# Patient Record
Sex: MALE | Race: AMERICAN INDIAN OR ALASKA NATIVE | NOT HISPANIC OR LATINO | Employment: OTHER | URBAN - METROPOLITAN AREA
[De-identification: names, ages, dates, MRNs, and addresses within clinical notes are randomized per-mention and may not be internally consistent; named-entity substitution may affect disease eponyms.]

---

## 2017-01-04 ENCOUNTER — DOCUMENTATION ONLY (OUTPATIENT)
Dept: FAMILY MEDICINE | Facility: CLINIC | Age: 77
End: 2017-01-04

## 2017-01-04 NOTE — PROGRESS NOTES
Pre-Visit Chart Review  For Appointment Scheduled on 1/5/2017.    Health Maintenance Due   Topic Date Due    TETANUS VACCINE  09/27/1958

## 2017-01-05 ENCOUNTER — OFFICE VISIT (OUTPATIENT)
Dept: FAMILY MEDICINE | Facility: CLINIC | Age: 77
End: 2017-01-05
Payer: MEDICARE

## 2017-01-05 ENCOUNTER — HOSPITAL ENCOUNTER (OUTPATIENT)
Dept: RADIOLOGY | Facility: CLINIC | Age: 77
Discharge: HOME | End: 2017-01-05
Attending: FAMILY MEDICINE
Payer: MEDICARE

## 2017-01-05 VITALS
DIASTOLIC BLOOD PRESSURE: 73 MMHG | TEMPERATURE: 98 F | BODY MASS INDEX: 33.69 KG/M2 | HEIGHT: 73 IN | RESPIRATION RATE: 18 BRPM | WEIGHT: 254.19 LBS | SYSTOLIC BLOOD PRESSURE: 129 MMHG | HEART RATE: 54 BPM

## 2017-01-05 DIAGNOSIS — J30.2 SEASONAL ALLERGIC RHINITIS, UNSPECIFIED ALLERGIC RHINITIS TRIGGER: ICD-10-CM

## 2017-01-05 DIAGNOSIS — N40.0 BENIGN NON-NODULAR PROSTATIC HYPERPLASIA WITHOUT LOWER URINARY TRACT SYMPTOMS: ICD-10-CM

## 2017-01-05 DIAGNOSIS — M10.9 GOUT, UNSPECIFIED CAUSE, UNSPECIFIED CHRONICITY, UNSPECIFIED SITE: ICD-10-CM

## 2017-01-05 DIAGNOSIS — R55 SYNCOPE AND COLLAPSE: ICD-10-CM

## 2017-01-05 DIAGNOSIS — R42 DIZZINESS: Primary | ICD-10-CM

## 2017-01-05 DIAGNOSIS — R42 VERTIGO: ICD-10-CM

## 2017-01-05 PROCEDURE — 93880 EXTRACRANIAL BILAT STUDY: CPT | Mod: TC,PO

## 2017-01-05 PROCEDURE — 93880 EXTRACRANIAL BILAT STUDY: CPT | Mod: 26,,, | Performed by: RADIOLOGY

## 2017-01-05 PROCEDURE — 99214 OFFICE O/P EST MOD 30 MIN: CPT | Mod: S$PBB,,, | Performed by: FAMILY MEDICINE

## 2017-01-05 PROCEDURE — 99999 PR PBB SHADOW E&M-EST. PATIENT-LVL III: CPT | Mod: PBBFAC,,, | Performed by: FAMILY MEDICINE

## 2017-01-05 RX ORDER — NAPROXEN 500 MG/1
500 TABLET ORAL 2 TIMES DAILY WITH MEALS
Qty: 30 TABLET | Refills: 0 | Status: SHIPPED | OUTPATIENT
Start: 2017-01-05 | End: 2017-01-20 | Stop reason: ALTCHOICE

## 2017-01-05 RX ORDER — TERAZOSIN 10 MG/1
10 CAPSULE ORAL NIGHTLY
Qty: 180 CAPSULE | Refills: 3
Start: 2017-01-05 | End: 2017-04-26 | Stop reason: DRUGHIGH

## 2017-01-05 NOTE — MR AVS SNAPSHOT
[facility]  [address]  [place] [address]  Phone: [phone]  Fax: [phone]                  Micheal Hunt   2017 8:00 AM   Office Visit    Description:  Male : 1940   Provider:  Morris Ramírez MD   Department:  [place] - Family Medicine           Reason for Visit     Dizziness           Diagnoses this Visit        Comments    Dizziness    -  Primary     Vertigo         Syncope and collapse         Seasonal allergic rhinitis, unspecified allergic rhinitis trigger         Benign non-nodular prostatic hyperplasia without lower urinary tract symptoms         Gout, unspecified cause, unspecified chronicity, unspecified site                To Do List           Future Appointments        Provider Department Dept Phone    2017 1:30 PM Pk Lakhani MD [facility] Medicine [phone]    3/10/2017 8:00 AM [facility] [phone]    3/17/2017 10:30 AM Getachew Dunlap MD [place] [facility] - Cardiology [phone]      Goals (5 Years of Data)     None      Follow-Up and Disposition     Return if symptoms worsen or fail to improve.       These Medications        Disp Refills Start End    terazosin (HYTRIN) 10 MG capsule 180 capsule 3 2017     Take 1 capsule (10 mg total) by mouth every evening. - Oral    Pharmacy: [facility]/pharmacy #[id number] - [place], [place] - [address] AT [facility] Ph #: [phone]       naproxen (NAPROSYN) 500 MG tablet 30 tablet 0 2017     Take 1 tablet (500 mg total) by mouth 2 (two) times daily with meals. When having active gout flare - Oral    Pharmacy: [facility]/pharmacy #[id number] - [place] - [address] AT [facility] Ph #: [phone]         [facility] On [facility]     [facility] On Call Nurse Care Line -  Assistance  Registered nurses in the [facility] provide clinical advisement, health education, appointment booking, and other advisory services.  Call  for this free service at [phone].             Medications           Message regarding Medications     Verify the changes and/or additions to your medication regime listed below are the same as discussed with your clinician today.  If any of these changes or additions are incorrect, please notify your healthcare provider.        START taking these NEW medications        Refills    naproxen (NAPROSYN) 500 MG tablet 0    Sig: Take 1 tablet (500 mg total) by mouth 2 (two) times daily with meals. When having active gout flare    Class: Normal    Route: Oral      CHANGE how you are taking these medications     Start Taking Instead of    terazosin (HYTRIN) 10 MG capsule terazosin (HYTRIN) 10 MG capsule    Dosage:  Take 1 capsule (10 mg total) by mouth every evening. Dosage:  Take 2 capsules (20 mg total) by mouth every evening.    Reason for Change:  Reorder       STOP taking these medications     cetirizine-pseudoephedrine 5-120 mg Tb12 Take 1 tablet by mouth 2 (two) times daily.           Verify that the below list of medications is an accurate representation of the medications you are currently taking.  If none reported, the list may be blank. If incorrect, please contact your healthcare provider. Carry this list with you in case of emergency.           Current Medications     allopurinol (ZYLOPRIM) 300 MG tablet Take 1 tablet (300 mg total) by mouth once daily.    amlodipine (NORVASC) 10 MG tablet Take 1 tablet (10 mg total) by mouth once daily.    aspirin (ECOTRIN) 81 MG EC tablet Take 81 mg by mouth once daily.      atorvastatin (LIPITOR) 80 MG tablet Take 1 tablet (80 mg total) by mouth once daily.    azelastine (ASTELIN) 137 mcg (0.1 %) nasal spray USE 2 SPRAYS TWICE DAILY IN EACH NOSTRIL    calcium-vitamin D 500-125 mg-unit tablet Take 1 tablet by mouth as needed.     carvedilol (COREG) 6.25 MG tablet Take 1 tablet (6.25 mg total) by mouth 2 (two) times daily with meals.    colchicine 0.6 mg tablet as  "needed.     cyanocobalamin, vitamin B-12, (VITAMIN B-12) 1,000 mcg Subl Take 1 tablet by mouth daily as needed. Takes 3,000mcg    fexofenadine (ALLEGRA) 180 MG tablet Take 1 tablet (180 mg total) by mouth once daily.    finasteride (PROSCAR) 5 mg tablet Take 1 tablet (5 mg total) by mouth once daily.    fluticasone (FLONASE) 50 mcg/actuation nasal spray 1 spray by Each Nare route once daily.    isosorbide mononitrate (IMDUR) 30 MG 24 hr tablet Take 1 tablet (30 mg total) by mouth once daily.    meclizine (ANTIVERT) 25 mg tablet Take 1 tablet (25 mg total) by mouth 3 (three) times daily as needed.    nitroGLYCERIN (NITROSTAT) 0.4 MG SL tablet Place 1 tablet (0.4 mg total) under the tongue every 5 (five) minutes as needed for Chest pain.    spironolactone (ALDACTONE) 25 MG tablet Take 1 tablet (25 mg total) by mouth once daily.    terazosin (HYTRIN) 10 MG capsule Take 1 capsule (10 mg total) by mouth every evening.    tramadol (ULTRAM) 50 mg tablet Take 1 tablet (50 mg total) by mouth every 6 (six) hours as needed.    valsartan (DIOVAN) 320 MG tablet Take 1 tablet (320 mg total) by mouth once daily.    naproxen (NAPROSYN) 500 MG tablet Take 1 tablet (500 mg total) by mouth 2 (two) times daily with meals. When having active gout flare           Clinical Reference Information           Vital Signs - Last Recorded  Most recent update: 1/5/2017  8:25 AM by Sen Agosto MA    BP Pulse Temp Resp Ht Wt    129/73 (BP Location: Right arm, Patient Position: Sitting, BP Method: Automatic) (!) 54 97.6 °F (36.4 °C) (Oral) 18 6' 1" (1.854 m) 115.3 kg (254 lb 3.1 oz)    BMI                33.54 kg/m2          Blood Pressure          Most Recent Value    BP  129/73      Allergies as of 1/5/2017     Eplerenone      Immunizations Administered on Date of Encounter - 1/5/2017     None      Orders Placed During Today's Visit     Future Labs/Procedures Expected by Expires    US Carotid Bilateral  1/5/2017 1/5/2018      "

## 2017-01-05 NOTE — PROGRESS NOTES
"[facility]  Progress Note    Subjective:       Patient ID: Micheal Hunt is a 76 y.o. male.    Chief Complaint: Dizziness    HPI76 y.o.male is here today complaining of dizziness.  The patient has been having a sense of dizziness/sense that room is spinning when changing positions.  Patient has most of dizziness 2 hours after taking his morning medications.  Sensation will last a few seconds to a few minutes and resolve on its own.  Patient has not had episodes where he completely lost consciousness.  Patient has recently had increase of dosage on Terazosin and Sudafed.  Patient has been scheduled to receive carotid ultrasound in the past but has not been scheduled.  Patient denies any chest pain, heart palpitations, shortness of breath, nausea vomiting, or diaphoresis during episodes.  Patient has appointment with ENT and cardiology next week.  No further complaints at today's visit.  Review of Systems   Constitutional: Negative for chills and fever.   HENT: Negative for congestion.    Eyes: Negative for pain.   Respiratory: Negative for shortness of breath and wheezing.    Cardiovascular: Negative for chest pain, palpitations and leg swelling.   Gastrointestinal: Negative for abdominal pain, nausea and vomiting.   Genitourinary: Negative for difficulty urinating.   Musculoskeletal: Negative for arthralgias, gait problem and myalgias.   Skin: Negative for rash.   Neurological: Positive for dizziness. Negative for seizures.       Objective:      Vitals:    01/05/17 0825   BP: 129/73   BP Location: Right arm   Patient Position: Sitting   BP Method: Automatic   Pulse: (!) 54   Resp: 18   Temp: 97.6 °F (36.4 °C)   TempSrc: Oral   Weight: 115.3 kg (254 lb 3.1 oz)   Height: 6' 1" (1.854 m)     Body mass index is 33.54 kg/(m^2).  Physical Exam   Constitutional: He is oriented to person, place, and time. He appears well-developed and well-nourished.   HENT:   Head: Normocephalic and atraumatic.   Negative " dill-hallpike bilateral    Eyes: Conjunctivae and EOM are normal. Pupils are equal, round, and reactive to light.   Neck: Normal range of motion. Neck supple. No JVD present.   Cardiovascular: Normal rate, regular rhythm and intact distal pulses.  Exam reveals no gallop and no friction rub.    Murmur heard.  3/5 systolic heart murmur  Negative carotid bruit   Patient dizzy when changing from seated to standing position    Pulmonary/Chest: Effort normal and breath sounds normal. No respiratory distress. He has no wheezes.   Abdominal: Soft. Bowel sounds are normal. There is no tenderness.   Musculoskeletal: Normal range of motion.   Neurological: He is alert and oriented to person, place, and time. No cranial nerve deficit.   + romberg   Skin: Skin is warm and dry.   Psychiatric: He has a normal mood and affect. His behavior is normal. Judgment and thought content normal.   Nursing note and vitals reviewed.      Assessment:       1. Dizziness    2. Vertigo    3. Syncope and collapse     4. Seasonal allergic rhinitis, unspecified allergic rhinitis trigger    5. Benign non-nodular prostatic hyperplasia without lower urinary tract symptoms    6. Gout, unspecified cause, unspecified chronicity, unspecified site        Plan:       Vertigo/Dizziness  -     US Carotid Bilateral; Future; Expected date: 1/5/17   -     Differential diagnosis include vasovagal secondary to medications vs carotid artery occulusion  - Will decrease terazosin to 10 mg daily and discontinue sudafed          - Follow up with cardiology for further cardiac workup if needed     Seasonal allergic rhinitis, unspecified allergic rhinitis trigger        - Patient advised to discontinue Sudafed or any alpha blockers that may be contribution to dizziness    Benign non-nodular prostatic hyperplasia without lower urinary tract symptoms  -     terazosin (HYTRIN) 10 MG capsule; Take 1 capsule (10 mg total) by mouth every evening.  Dispense: 180 capsule; Refill:  3    Gout, unspecified cause, unspecified chronicity, unspecified site  -     naproxen (NAPROSYN) 500 MG tablet; Take 1 tablet (500 mg total) by mouth 2 (two) times daily with meals. When having active gout flare  Dispense: 30 tablet; Refill: 0  - Patient insurance is no longer paying for colchicine     Return if symptoms worsen or fail to improve.  Morris Ramírez MD  [facility]  1/5/2017 8:45 AM

## 2017-01-09 ENCOUNTER — TELEPHONE (OUTPATIENT)
Dept: FAMILY MEDICINE | Facility: CLINIC | Age: 77
End: 2017-01-09

## 2017-01-09 NOTE — TELEPHONE ENCOUNTER
----- Message from Morris Ramírez MD sent at 1/5/2017  2:12 PM CST -----  Please call and inform patient that his ultrasound of his neck showed moderate plaque formation bilaterally.  Patient does not make surgical intervention at the current time.  Patient will need to follow with cardiology.

## 2017-01-09 NOTE — TELEPHONE ENCOUNTER
Reviewed with patient; he voices understanding and has 3/2017  appointment. When asked, he says he has no questions/concerns.

## 2017-01-10 ENCOUNTER — HOSPITAL ENCOUNTER (OUTPATIENT)
Dept: RADIOLOGY | Facility: HOSPITAL | Age: 77
Discharge: HOME | End: 2017-01-10
Attending: OTOLARYNGOLOGY
Payer: MEDICARE

## 2017-01-10 DIAGNOSIS — J32.4 CHRONIC PANSINUSITIS: ICD-10-CM

## 2017-01-10 PROCEDURE — 70486 CT MAXILLOFACIAL W/O DYE: CPT | Mod: 26,,, | Performed by: RADIOLOGY

## 2017-01-10 PROCEDURE — 70486 CT MAXILLOFACIAL W/O DYE: CPT | Mod: TC

## 2017-01-20 ENCOUNTER — LAB VISIT (OUTPATIENT)
Dept: LAB | Facility: HOSPITAL | Age: 77
End: 2017-01-20
Attending: FAMILY MEDICINE
Payer: MEDICARE

## 2017-01-20 ENCOUNTER — OFFICE VISIT (OUTPATIENT)
Dept: FAMILY MEDICINE | Facility: CLINIC | Age: 77
End: 2017-01-20
Payer: MEDICARE

## 2017-01-20 VITALS
SYSTOLIC BLOOD PRESSURE: 122 MMHG | HEIGHT: 73 IN | BODY MASS INDEX: 33.4 KG/M2 | HEART RATE: 72 BPM | DIASTOLIC BLOOD PRESSURE: 73 MMHG | WEIGHT: 252 LBS | TEMPERATURE: 98 F

## 2017-01-20 DIAGNOSIS — I10 ESSENTIAL HYPERTENSION: Primary | ICD-10-CM

## 2017-01-20 DIAGNOSIS — I95.2 HYPOTENSION DUE TO DRUGS: Chronic | ICD-10-CM

## 2017-01-20 DIAGNOSIS — M17.2 POST-TRAUMATIC OSTEOARTHRITIS OF BOTH KNEES: ICD-10-CM

## 2017-01-20 DIAGNOSIS — Z00.00 ANNUAL PHYSICAL EXAM: ICD-10-CM

## 2017-01-20 DIAGNOSIS — M1A.3720 CHRONIC GOUT OF LEFT ANKLE DUE TO RENAL IMPAIRMENT WITHOUT TOPHUS: ICD-10-CM

## 2017-01-20 LAB
ANION GAP SERPL CALC-SCNC: 6 MMOL/L
BUN SERPL-MCNC: 32 MG/DL
CALCIUM SERPL-MCNC: 8.7 MG/DL
CHLORIDE SERPL-SCNC: 108 MMOL/L
CO2 SERPL-SCNC: 25 MMOL/L
CREAT SERPL-MCNC: 2.6 MG/DL
EST. GFR  (AFRICAN AMERICAN): 26.3 ML/MIN/1.73 M^2
EST. GFR  (NON AFRICAN AMERICAN): 22.8 ML/MIN/1.73 M^2
GLUCOSE SERPL-MCNC: 131 MG/DL
POTASSIUM SERPL-SCNC: 4.4 MMOL/L
SODIUM SERPL-SCNC: 139 MMOL/L
URATE SERPL-MCNC: 6 MG/DL

## 2017-01-20 PROCEDURE — 84550 ASSAY OF BLOOD/URIC ACID: CPT

## 2017-01-20 PROCEDURE — 86787 VARICELLA-ZOSTER ANTIBODY: CPT

## 2017-01-20 PROCEDURE — 80048 BASIC METABOLIC PNL TOTAL CA: CPT

## 2017-01-20 PROCEDURE — 99214 OFFICE O/P EST MOD 30 MIN: CPT | Mod: S$PBB,,, | Performed by: FAMILY MEDICINE

## 2017-01-20 PROCEDURE — 99213 OFFICE O/P EST LOW 20 MIN: CPT | Mod: PBBFAC,PO | Performed by: FAMILY MEDICINE

## 2017-01-20 PROCEDURE — 36415 COLL VENOUS BLD VENIPUNCTURE: CPT | Mod: PO

## 2017-01-20 PROCEDURE — 99999 PR PBB SHADOW E&M-EST. PATIENT-LVL III: CPT | Mod: PBBFAC,,, | Performed by: FAMILY MEDICINE

## 2017-01-20 RX ORDER — COLCHICINE 0.6 MG/1
0.6 TABLET ORAL 2 TIMES DAILY
Qty: 10 TABLET | Refills: 3 | Status: SHIPPED | OUTPATIENT
Start: 2017-01-20 | End: 2017-04-24 | Stop reason: SDUPTHER

## 2017-01-20 RX ORDER — AMLODIPINE BESYLATE 10 MG/1
10 TABLET ORAL
Qty: 90 TABLET | Refills: 3
Start: 2017-01-20 | End: 2017-03-24 | Stop reason: SDUPTHER

## 2017-01-20 NOTE — PROGRESS NOTES
Subjective:       Patient ID: Micheal Hunt is a 76 y.o. male.    Chief Complaint: Establish Care; Dizziness; and Hypertension    HPI Comments: Dizziness maybe associated to BP medications, orthostatic. No vertigo no aura,     Dizziness:   Chronicity:  Chronic  Progression since onset:  Waxing and waning  Pain Scale:  4/10  Dizziness characteristics:  Spacial disorientation and giddiness   Associated symptoms: hearing loss, ear congestion, light-headedness and palpitations.no fever, no headaches, no diaphoresis, no facial weakness, no slurred speech, no numbness in extremities and no chest pain.  Aggravated by:  Getting up   PMH includes: neurologic disease.    Review of Systems   Constitutional: Positive for fatigue. Negative for diaphoresis and fever.   HENT: Positive for hearing loss and postnasal drip.    Eyes: Negative for discharge.   Respiratory: Positive for shortness of breath. Negative for wheezing and stridor.    Cardiovascular: Positive for palpitations. Negative for chest pain and leg swelling.   Gastrointestinal: Negative for abdominal distention and anal bleeding.   Genitourinary: Positive for urgency. Negative for difficulty urinating, dysuria and enuresis.   Musculoskeletal: Positive for arthralgias and back pain.   Neurological: Positive for dizziness and light-headedness. Negative for headaches.   Psychiatric/Behavioral: Positive for decreased concentration.       Objective:      Physical Exam   Constitutional: He is oriented to person, place, and time. He appears well-developed and well-nourished. No distress.   HENT:   Head: Normocephalic and atraumatic.   Right Ear: External ear normal.   Left Ear: External ear normal.   Nose: Nose normal.   Mouth/Throat: Oropharynx is clear and moist. No oropharyngeal exudate.   Eyes: Conjunctivae and EOM are normal. Pupils are equal, round, and reactive to light. Right eye exhibits no discharge. Left eye exhibits no discharge. No scleral icterus.    Neck: Normal range of motion. Neck supple. No JVD present. No tracheal deviation present. No thyromegaly present.   Cardiovascular: Normal rate, normal heart sounds and intact distal pulses.    No murmur heard.  Pulmonary/Chest: Effort normal and breath sounds normal. No respiratory distress. He has no wheezes. He has no rales.   Abdominal: Soft. Bowel sounds are normal. He exhibits no distension and no mass. There is no tenderness. There is no rebound and no guarding.   Musculoskeletal: He exhibits no edema or tenderness.          Lymphadenopathy:     He has no cervical adenopathy.   Neurological: He is alert and oriented to person, place, and time. No cranial nerve deficit. Coordination normal.   Skin: Skin is warm and dry. No rash noted. He is not diaphoretic. No erythema. No pallor.   Psychiatric: He has a normal mood and affect. His behavior is normal. Judgment and thought content normal.   Vitals reviewed.      Assessment:       1. Essential hypertension    2. Hypotension due to drugs    3. 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 Annual physical exam    5. Post-traumatic osteoarthritis of both knees        Plan:       Essential hypertension  -     amlodipine (NORVASC) 10 MG tablet; Take 1 tablet (10 mg total) by mouth before dinner.  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  Dispense: 10 tablet; Refill: 3    Annual physical exam  -     Varicella zoster antibody, IgG; Future; Expected date: 1/20/17    Post-traumatic osteoarthritis of both knees  -     Ambulatory Referral to Physical/Occupational Therapy      Patient readiness: acceptance and  barriers:readiness, social stressors and household issues    During the course of the visit the patient was educated and counseled about the following:     Hypertension:   Dietary sodium restriction.  Regular aerobic exercise.  Check blood pressures daily and record.  Obesity:   General weight loss/lifestyle modification strategies discussed (elicit support from others; identify saboteurs; non-food rewards, etc).    Goals: Hypertension: Reduce Blood Pressure and Obesity: Reduce calorie intake and BMI    Did patient meet goals/outcomes: Yes    The following self management tools provided: blood pressure log  excercise log    Patient Instructions (the written plan) was given to the patient/family.     Time spent with patient: 45 minutes

## 2017-01-20 NOTE — MR AVS SNAPSHOT
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         Annual physical exam         Post-traumatic osteoarthritis of both knees                To Do List           Future Appointments        Provider Department Dept Phone    2017 3:15 PM LAB, [facility] [place] [facility] - Lab [phone]    2017 8:00 AM Pk Lakhani MD [facility] [phone]    3/10/2017 8:00 AM [facility] [phone]    3/17/2017 10:30 AM Getachew Dunlap MD [facility] - Cardiology [phone]    2017 8:40 AM Viktoria Chauhan PA-C [facility] Medicine [phone]      Goals (5 Years of Data)     None      Follow-Up and Disposition     Return in about 3 months (around 2017).       These Medications        Disp Refills Start End    amlodipine (NORVASC) 10 MG tablet 90 tablet 3 2017     Take 1 tablet (10 mg total) by mouth before dinner. - Oral    Pharmacy: [facility]/pharmacy #[id number] - [place] - [address] AT [facility] Ph #: [phone]       colchicine 0.6 mg tablet 10 tablet 3 2017     Take 1 tablet (0.6 mg total) by mouth 2 (two) times daily. - Oral    Pharmacy: [facility]/pharmacy #[id number] - [place] - [address] AT [facility] Ph #: [phone]         [facility] On [facility]     [facility] Nurse Care Line -  Assistance  Registered nurses in the [facility] provide clinical advisement, health  education, appointment booking, and other advisory services.  Call for this free service at [phone].             Medications           Message regarding Medications     Verify the changes and/or additions to your medication regime listed below are the same as discussed with your clinician today.  If any of these changes or additions are incorrect, please notify your healthcare provider.        CHANGE how you are taking these medications     Start Taking Instead of    amlodipine (NORVASC) 10 MG tablet amlodipine (NORVASC) 10 MG tablet    Dosage:  Take 1 tablet (10 mg total) by mouth before dinner. Dosage:  Take 1 tablet (10 mg total) by mouth once daily.    Reason for Change:  Reorder     colchicine 0.6 mg tablet colchicine 0.6 mg tablet    Dosage:  Take 1 tablet (0.6 mg total) by mouth 2 (two) times daily. Dosage:  as needed.     Reason for Change:  Reorder       STOP taking these medications     spironolactone (ALDACTONE) 25 MG tablet Take 1 tablet (25 mg total) by mouth once daily.    naproxen (NAPROSYN) 500 MG tablet Take 1 tablet (500 mg total) by mouth 2 (two) times daily with meals. When having active gout flare    meclizine (ANTIVERT) 25 mg tablet Take 1 tablet (25 mg total) by mouth 3 (three) times daily as needed.    isosorbide mononitrate (IMDUR) 30 MG 24 hr tablet Take 1 tablet (30 mg total) by mouth once daily.           Verify that the below list of medications is an accurate representation of the medications you are currently taking.  If none reported, the list may be blank. If incorrect, please contact your healthcare provider. Carry this list with you in case of emergency.           Current Medications     allopurinol (ZYLOPRIM) 300 MG tablet Take 1 tablet (300 mg total) by mouth once daily.    amlodipine (NORVASC) 10 MG tablet Take 1 tablet (10 mg total) by mouth before dinner.    aspirin (ECOTRIN) 81 MG EC tablet Take 81 mg by mouth once daily.      atorvastatin (LIPITOR) 80 MG tablet Take 1  "tablet (80 mg total) by mouth once daily.    azelastine (ASTELIN) 137 mcg (0.1 %) nasal spray USE 2 SPRAYS TWICE DAILY IN EACH NOSTRIL    calcium-vitamin D 500-125 mg-unit tablet Take 1 tablet by mouth as needed.     carvedilol (COREG) 6.25 MG tablet Take 1 tablet (6.25 mg total) by mouth 2 (two) times daily with meals.    colchicine 0.6 mg tablet Take 1 tablet (0.6 mg total) by mouth 2 (two) times daily.    cyanocobalamin, vitamin B-12, (VITAMIN B-12) 1,000 mcg Subl Take 1 tablet by mouth daily as needed. Takes 3,000mcg    fexofenadine (ALLEGRA) 180 MG tablet Take 1 tablet (180 mg total) by mouth once daily.    finasteride (PROSCAR) 5 mg tablet Take 1 tablet (5 mg total) by mouth once daily.    fluticasone (FLONASE) 50 mcg/actuation nasal spray 1 spray by Each Nare route once daily.    nitroGLYCERIN (NITROSTAT) 0.4 MG SL tablet Place 1 tablet (0.4 mg total) under the tongue every 5 (five) minutes as needed for Chest pain.    terazosin (HYTRIN) 10 MG capsule Take 1 capsule (10 mg total) by mouth every evening.    tramadol (ULTRAM) 50 mg tablet Take 1 tablet (50 mg total) by mouth every 6 (six) hours as needed.    valsartan (DIOVAN) 320 MG tablet Take 1 tablet (320 mg total) by mouth once daily.           Clinical Reference Information           Vital Signs - Last Recorded  Most recent update: 1/20/2017  1:46 PM by Lorrie Ramirez MA    BP Pulse Temp Ht Wt BMI    122/73 (BP Location: Right arm, Patient Position: Lying, BP Method: Automatic) 72 98.1 °F (36.7 °C) (Oral) 6' 1" (1.854 m) 114.3 kg (251 lb 15.8 oz) 33.25 kg/m2      Blood Pressure          Most Recent Value    BP  122/73      Allergies as of 1/20/2017     Eplerenone      Immunizations Administered on Date of Encounter - 1/20/2017     None      Orders Placed During Today's Visit      Normal Orders This Visit    Ambulatory Referral to Physical/Occupational Therapy     Future Labs/Procedures Expected by Expires    Basic metabolic panel  1/20/2017 3/21/2018    " Uric acid  1/20/2017 3/21/2018    Varicella zoster antibody, IgG  1/20/2017 3/21/2018

## 2017-01-23 LAB
VARICELLA INTERPRETATION: POSITIVE
VARICELLA ZOSTER IGG: 4.55 ISR

## 2017-01-24 ENCOUNTER — LAB VISIT (OUTPATIENT)
Dept: LAB | Facility: HOSPITAL | Age: 77
End: 2017-01-24
Attending: INTERNAL MEDICINE
Payer: MEDICARE

## 2017-01-24 DIAGNOSIS — M19.90 LOCALIZED OSTEOARTHROSIS NOT SPECIFIED WHETHER PRIMARY OR SECONDARY, UNSPECIFIED SITE: ICD-10-CM

## 2017-01-24 DIAGNOSIS — N40.0 ENLARGED PROSTATE: ICD-10-CM

## 2017-01-24 DIAGNOSIS — N18.30 CHRONIC KIDNEY DISEASE, STAGE III (MODERATE): Primary | ICD-10-CM

## 2017-01-24 DIAGNOSIS — M10.9 GOUT, UNSPECIFIED: ICD-10-CM

## 2017-01-24 DIAGNOSIS — R53.81 OTHER MALAISE AND FATIGUE: ICD-10-CM

## 2017-01-24 DIAGNOSIS — R53.83 OTHER MALAISE AND FATIGUE: ICD-10-CM

## 2017-01-24 DIAGNOSIS — I10 ESSENTIAL HYPERTENSION, MALIGNANT: ICD-10-CM

## 2017-01-24 DIAGNOSIS — N18.4 CHRONIC KIDNEY DISEASE, STAGE IV (SEVERE): ICD-10-CM

## 2017-01-24 DIAGNOSIS — I25.729 ATHEROSCLEROSIS OF AUTOLOGOUS ARTERY CORONARY ARTERY BYPASS GRAFT WITH ANGINA PECTORIS: ICD-10-CM

## 2017-01-24 LAB
ALBUMIN SERPL BCP-MCNC: 4 G/DL
ALP SERPL-CCNC: 100 U/L
ALT SERPL W/O P-5'-P-CCNC: 24 U/L
ANION GAP SERPL CALC-SCNC: 11 MMOL/L
AST SERPL-CCNC: 25 U/L
BASOPHILS # BLD AUTO: 0 K/UL
BASOPHILS NFR BLD: 0.4 %
BILIRUB SERPL-MCNC: 0.3 MG/DL
BUN SERPL-MCNC: 30 MG/DL
C3 SERPL-MCNC: 124 MG/DL
C4 SERPL-MCNC: 40 MG/DL
CALCIUM SERPL-MCNC: 9.4 MG/DL
CHLORIDE SERPL-SCNC: 105 MMOL/L
CO2 SERPL-SCNC: 22 MMOL/L
CREAT SERPL-MCNC: 2.5 MG/DL
DIFFERENTIAL METHOD: ABNORMAL
EOSINOPHIL # BLD AUTO: 0.2 K/UL
EOSINOPHIL NFR BLD: 3.1 %
ERYTHROCYTE [DISTWIDTH] IN BLOOD BY AUTOMATED COUNT: 13.8 %
EST. GFR  (AFRICAN AMERICAN): 28 ML/MIN/1.73 M^2
EST. GFR  (NON AFRICAN AMERICAN): 24 ML/MIN/1.73 M^2
GLUCOSE SERPL-MCNC: 113 MG/DL
HCT VFR BLD AUTO: 39 %
HGB BLD-MCNC: 12.8 G/DL
LYMPHOCYTES # BLD AUTO: 1.6 K/UL
LYMPHOCYTES NFR BLD: 30 %
MCH RBC QN AUTO: 29.8 PG
MCHC RBC AUTO-ENTMCNC: 32.8 %
MCV RBC AUTO: 91 FL
MONOCYTES # BLD AUTO: 0.3 K/UL
MONOCYTES NFR BLD: 6.4 %
NEUTROPHILS # BLD AUTO: 3.1 K/UL
NEUTROPHILS NFR BLD: 60.1 %
PHOSPHATE SERPL-MCNC: 4 MG/DL
PLATELET # BLD AUTO: 184 K/UL
PMV BLD AUTO: 7.8 FL
POTASSIUM SERPL-SCNC: 4.2 MMOL/L
PROT 24H UR-MRATE: 111 MG/SPEC
PROT SERPL-MCNC: 7.2 G/DL
PROT UR-MCNC: 370 MG/DL
PTH-INTACT SERPL-MCNC: 110.4 PG/ML
RBC # BLD AUTO: 4.31 M/UL
RHEUMATOID FACT SERPL-ACNC: <10 IU/ML
SODIUM SERPL-SCNC: 138 MMOL/L
URATE SERPL-MCNC: 6.7 MG/DL
URINE COLLECTION DURATION: ABNORMAL HR
URINE VOLUME: 30 ML
WBC # BLD AUTO: 5.2 K/UL

## 2017-01-24 PROCEDURE — 84550 ASSAY OF BLOOD/URIC ACID: CPT

## 2017-01-24 PROCEDURE — 86160 COMPLEMENT ANTIGEN: CPT | Mod: 59

## 2017-01-24 PROCEDURE — 84166 PROTEIN E-PHORESIS/URINE/CSF: CPT

## 2017-01-24 PROCEDURE — 84165 PROTEIN E-PHORESIS SERUM: CPT

## 2017-01-24 PROCEDURE — 84166 PROTEIN E-PHORESIS/URINE/CSF: CPT | Mod: 26,,, | Performed by: PATHOLOGY

## 2017-01-24 PROCEDURE — 86038 ANTINUCLEAR ANTIBODIES: CPT

## 2017-01-24 PROCEDURE — 85025 COMPLETE CBC W/AUTO DIFF WBC: CPT

## 2017-01-24 PROCEDURE — 83970 ASSAY OF PARATHORMONE: CPT

## 2017-01-24 PROCEDURE — 86431 RHEUMATOID FACTOR QUANT: CPT

## 2017-01-24 PROCEDURE — 86160 COMPLEMENT ANTIGEN: CPT

## 2017-01-24 PROCEDURE — 84165 PROTEIN E-PHORESIS SERUM: CPT | Mod: 26,,, | Performed by: PATHOLOGY

## 2017-01-24 PROCEDURE — 84100 ASSAY OF PHOSPHORUS: CPT

## 2017-01-24 PROCEDURE — 36415 COLL VENOUS BLD VENIPUNCTURE: CPT

## 2017-01-24 PROCEDURE — 84156 ASSAY OF PROTEIN URINE: CPT

## 2017-01-24 PROCEDURE — 80053 COMPREHEN METABOLIC PANEL: CPT

## 2017-01-25 LAB
ALBUMIN SERPL ELPH-MCNC: 4 G/DL
ALPHA1 GLOB SERPL ELPH-MCNC: 0.3 G/DL
ALPHA2 GLOB SERPL ELPH-MCNC: 0.57 G/DL
ANA SER QL IF: NORMAL
B-GLOBULIN SERPL ELPH-MCNC: 0.92 G/DL
GAMMA GLOB SERPL ELPH-MCNC: 1.21 G/DL
PROT SERPL-MCNC: 7 G/DL

## 2017-01-26 ENCOUNTER — TELEPHONE (OUTPATIENT)
Dept: FAMILY MEDICINE | Facility: CLINIC | Age: 77
End: 2017-01-26

## 2017-01-26 ENCOUNTER — CLINICAL SUPPORT (OUTPATIENT)
Dept: FAMILY MEDICINE | Facility: CLINIC | Age: 77
End: 2017-01-26
Payer: MEDICARE

## 2017-01-26 VITALS — SYSTOLIC BLOOD PRESSURE: 144 MMHG | HEART RATE: 72 BPM | TEMPERATURE: 98 F | DIASTOLIC BLOOD PRESSURE: 82 MMHG

## 2017-01-26 LAB
PATHOLOGIST INTERPRETATION UPE: NORMAL
PROT PATTERN UR ELPH-IMP: NORMAL

## 2017-01-26 PROCEDURE — 99213 OFFICE O/P EST LOW 20 MIN: CPT | Mod: PBBFAC,PO | Performed by: FAMILY MEDICINE

## 2017-01-26 PROCEDURE — 99999 PR PBB SHADOW E&M-EST. PATIENT-LVL III: CPT | Mod: PBBFAC,,, | Performed by: FAMILY MEDICINE

## 2017-01-26 NOTE — TELEPHONE ENCOUNTER
Spoke with patient, informed him of Dr. Lakhani's recommendations and need for follow up nurse visit related to hypertension. Appointment scheduled. Patient verbalized understanding.

## 2017-01-26 NOTE — PROGRESS NOTES
Patient present for nurse visit related to hypertension. Blood pressure assessed in left arm with patient's new monitoring device while patient was sitting up straight, feet flat on the floor, & arm resting on desk with a result of 164/90 Pulse 71.  Blood pressure was then assessed in left arm with patient's old monitoring device with result of 155/95 Pulse 73.  After 5 minutes blood pressure was assessed in right arm using office robot with result of 142/86 Pulse 72.  Blood pressure assessed manually in left arm with result of 144/82. Patient denies HA, dizziness, or pain of any kind. Encouraged patient to continue all medications as prescribed. Patient verbalized understanding. Blood pressure log provided by patient and will be sent to scanning.

## 2017-01-28 DIAGNOSIS — E78.5 HYPERLIPIDEMIA: ICD-10-CM

## 2017-01-30 DIAGNOSIS — E78.5 HYPERLIPIDEMIA, UNSPECIFIED HYPERLIPIDEMIA TYPE: ICD-10-CM

## 2017-01-30 LAB — PATHOLOGIST INTERPRETATION SPE: NORMAL

## 2017-01-30 RX ORDER — ATORVASTATIN CALCIUM 80 MG/1
40 TABLET, FILM COATED ORAL DAILY
Qty: 45 TABLET | Refills: 3 | Status: SHIPPED | OUTPATIENT
Start: 2017-01-30 | End: 2017-03-24 | Stop reason: SDUPTHER

## 2017-01-30 RX ORDER — ATORVASTATIN CALCIUM 80 MG/1
TABLET, FILM COATED ORAL
Qty: 90 TABLET | Refills: 2 | Status: SHIPPED | OUTPATIENT
Start: 2017-01-30 | End: 2017-01-30 | Stop reason: SDUPTHER

## 2017-02-01 ENCOUNTER — TELEPHONE (OUTPATIENT)
Dept: PODIATRY | Facility: CLINIC | Age: 77
End: 2017-02-01

## 2017-02-01 NOTE — TELEPHONE ENCOUNTER
----- Message from Mary Castro sent at 2/1/2017 11:20 AM CST -----  Please call patient in regards to a appt today for rt foot pain [phone] (home)

## 2017-02-02 ENCOUNTER — HOSPITAL ENCOUNTER (OUTPATIENT)
Dept: RADIOLOGY | Facility: CLINIC | Age: 77
Discharge: HOME | End: 2017-02-02
Attending: PODIATRIST
Payer: MEDICARE

## 2017-02-02 ENCOUNTER — OFFICE VISIT (OUTPATIENT)
Dept: PODIATRY | Facility: CLINIC | Age: 77
End: 2017-02-02
Payer: MEDICARE

## 2017-02-02 VITALS — BODY MASS INDEX: 32.84 KG/M2 | HEIGHT: 73 IN | WEIGHT: 247.81 LBS

## 2017-02-02 DIAGNOSIS — M19.079 ARTHRITIS OF FOOT: ICD-10-CM

## 2017-02-02 DIAGNOSIS — M19.079 ARTHRITIS OF FOOT: Primary | ICD-10-CM

## 2017-02-02 DIAGNOSIS — M21.6X9 ACQUIRED EQUINUS DEFORMITY OF FOOT, UNSPECIFIED LATERALITY: ICD-10-CM

## 2017-02-02 DIAGNOSIS — M20.10 HALLUX ABDUCTO VALGUS, UNSPECIFIED LATERALITY: ICD-10-CM

## 2017-02-02 DIAGNOSIS — M79.673 FOOT ARCH PAIN, UNSPECIFIED LATERALITY: ICD-10-CM

## 2017-02-02 PROCEDURE — 73630 X-RAY EXAM OF FOOT: CPT | Mod: 50,TC,PO

## 2017-02-02 PROCEDURE — 99999 PR PBB SHADOW E&M-EST. PATIENT-LVL III: CPT | Mod: PBBFAC,,, | Performed by: PODIATRIST

## 2017-02-02 PROCEDURE — 73630 X-RAY EXAM OF FOOT: CPT | Mod: 26,50,S$GLB, | Performed by: RADIOLOGY

## 2017-02-02 PROCEDURE — 99214 OFFICE O/P EST MOD 30 MIN: CPT | Mod: 25,S$PBB,, | Performed by: PODIATRIST

## 2017-02-02 PROCEDURE — 29540 STRAPPING ANKLE &/FOOT: CPT | Mod: 50,S$PBB,, | Performed by: PODIATRIST

## 2017-02-02 RX ORDER — METHYLPREDNISOLONE 4 MG/1
TABLET ORAL
Qty: 1 PACKAGE | Refills: 0 | Status: SHIPPED | OUTPATIENT
Start: 2017-02-02 | End: 2017-02-17

## 2017-02-02 NOTE — MR AVS SNAPSHOT
[facility]  [address] [place] [address]  Phone: [phone]                  Micheal Hunt   2017 1:00 PM   Office Visit    Description:  Male : 1940   Provider:  Garth Reyna DPM   Department:  [facility]           Reason for Visit     Foot Pain           Diagnoses this Visit        Comments    Arthritis of foot    -  Primary     Foot arch pain, unspecified laterality         Hallux abducto valgus, unspecified laterality         Acquired equinus deformity of foot, unspecified laterality                To Do List           Future Appointments        Provider Department Dept Phone    2017 1:30 PM [facility] XR1 [facility]- X-Ray [phone]    2017 2:30 PM Pk Lakhani MD [facility] [phone]    3/2/2017 8:30 AM Garth Ryena DPM [facility] [phone]    3/10/2017 8:00 AM [facility] [phone]    3/17/2017 10:30 AM Getachew Dunlap MD [facility] - Cardiology [phone]      Goals (5 Years of Data)     None      Follow-Up and Disposition     Return in about 1 month (around 3/2/2017).       These Medications        Disp Refills Start End    methylPREDNISolone (MEDROL DOSEPACK) 4 mg tablet 1 Package 0 2017     use as directed    Pharmacy: [facility]/pharmacy #[id number] - [place], [place] - [address] AT [facility] Ph #: [phone]         [facility] On Call     [facility] On Call Nurse Care Line -  Assistance  Registered nurses in the [facility] On Call Center provide clinical advisement, health education, appointment booking, and other advisory services.  Call for this free service at [phone].             Medications           Message regarding Medications     Verify the changes and/or additions to your medication regime listed below are the same as discussed with your clinician today.  If any of these changes or additions are incorrect, please notify your  healthcare provider.        START taking these NEW medications        Refills    methylPREDNISolone (MEDROL DOSEPACK) 4 mg tablet 0    Sig: use as directed    Class: Normal           Verify that the below list of medications is an accurate representation of the medications you are currently taking.  If none reported, the list may be blank. If incorrect, please contact your healthcare provider. Carry this list with you in case of emergency.           Current Medications     allopurinol (ZYLOPRIM) 300 MG tablet Take 1 tablet (300 mg total) by mouth once daily.    amlodipine (NORVASC) 10 MG tablet Take 1 tablet (10 mg total) by mouth before dinner.    aspirin (ECOTRIN) 81 MG EC tablet Take 81 mg by mouth once daily.      atorvastatin (LIPITOR) 80 MG tablet Take 0.5 tablets (40 mg total) by mouth once daily.    azelastine (ASTELIN) 137 mcg (0.1 %) nasal spray USE 2 SPRAYS TWICE DAILY IN EACH NOSTRIL    calcium-vitamin D 500-125 mg-unit tablet Take 1 tablet by mouth as needed.     carvedilol (COREG) 6.25 MG tablet Take 1 tablet (6.25 mg total) by mouth 2 (two) times daily with meals.    colchicine 0.6 mg tablet Take 1 tablet (0.6 mg total) by mouth 2 (two) times daily.    cyanocobalamin, vitamin B-12, (VITAMIN B-12) 1,000 mcg Subl Take 1 tablet by mouth daily as needed. Takes 3,000mcg    fexofenadine (ALLEGRA) 180 MG tablet Take 1 tablet (180 mg total) by mouth once daily.    finasteride (PROSCAR) 5 mg tablet Take 1 tablet (5 mg total) by mouth once daily.    fluticasone (FLONASE) 50 mcg/actuation nasal spray 1 spray by Each Nare route once daily.    nitroGLYCERIN (NITROSTAT) 0.4 MG SL tablet Place 1 tablet (0.4 mg total) under the tongue every 5 (five) minutes as needed for Chest pain.    terazosin (HYTRIN) 10 MG capsule Take 1 capsule (10 mg total) by mouth every evening.    tramadol (ULTRAM) 50 mg tablet Take 1 tablet (50 mg total) by mouth every 6 (six) hours as needed.    valsartan (DIOVAN) 320 MG tablet Take 1  "tablet (320 mg total) by mouth once daily.    methylPREDNISolone (MEDROL DOSEPACK) 4 mg tablet use as directed           Clinical Reference Information           Your Vitals Were     Height Weight BMI          6' 1" (1.854 m) 112.4 kg (247 lb 12.8 oz) 32.69 kg/m2        Allergies as of 2/2/2017     Eplerenone      Immunizations Administered on Date of Encounter - 2/2/2017     None      Orders Placed During Today's Visit     Future Labs/Procedures Expected by Expires    X-Ray Foot Complete Bilateral  2/2/2017 2/3/2018      Language Assistance Services     ATTENTION: Language assistance services are available, free of charge. Please call [phone].      ATENCIÓN: Si mertla samy, tiene a nolan disposición servicios gratuitos de asistencia lingüística. Llame al [phone].     CHÚ Ý: N?u b?n nói Ti?ng Vi?t, có các d?ch v? h? tr? ngôn ng? mi?n phí dành cho b?n. G?i s? [phone].         [facility] complies with applicable Federal civil rights laws and does not discriminate on the basis of race, color, national origin, age, disability, or sex.        "

## 2017-02-02 NOTE — PROGRESS NOTES
Subjective:      Patient ID: Micheal Hunt is a 76 y.o. male.    Chief Complaint: Foot Pain (itching and pain right bottom of foot)    Deep sharp and aching pain midfoot right and left.  Gradual onset, worsening over past several weeks, aggravated by increased weight bearing, shoe gear, pressure.  No previous medical treatment.  OTC pain med not helping.  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  No recent injury, denies prior surgery both feet.      Review of Systems   Constitution: Negative for chills, diaphoresis, fever, malaise/fatigue and night sweats.   Cardiovascular: Negative for claudication, cyanosis, leg swelling and syncope.   Skin: Negative for color change, dry skin, rash, suspicious lesions and unusual hair distribution.   Musculoskeletal: Positive for arthritis and joint pain. Negative for falls, joint swelling, muscle cramps, muscle weakness and stiffness.   Gastrointestinal: Negative for constipation, diarrhea, nausea and vomiting.   Neurological: Negative for brief paralysis, disturbances in coordination, focal weakness, numbness, paresthesias, sensory change and tremors.           Objective:      Physical Exam   Constitutional: He is oriented to person, place, and time. He appears well-developed and well-nourished. He is cooperative.   Oriented to time, place, and person.   Cardiovascular:   Pulses:       Dorsalis pedis pulses are 1+ on the right side, and 1+ on the left side.        Posterior tibial pulses are 1+ on the right side, and 1+ on the left side.   Capillary fill time 3-5 seconds.  All toes warm to touch.      Negative lower extremity edema bilateral.    Negative elevational pallor and dependent rubor bilateral.     Musculoskeletal:   Deep generalized aching throbbing pain midfoot at tmtj level both feet on wb exam and with ambulation without focal structure of most symptom bilateral to palpation and range of motion both feet nwb.    Ankle dorsiflexion decreased at <10  degrees bilateral with moderate increase with knee flexion bilateral.    Visible and palpable bunion without pain at dorsomedial 1st metatarsal head left and right.  Hallux abducted left and right partially reducible, tracks laterally without being track bound.  No ecchymosis, erythema, edema, or cardinal signs infection or signs of trauma same foot.    Otherwise, Normal angle, base, station of gait. All ten toes without clubbing, cyanosis, or signs of ischemia.  No pain to palpation bilateral lower extremities.  Range of motion, stability, muscle strength, and muscle tone normal bilateral feet and legs.     Lymphadenopathy:   Negative lymphadenopathy bilateral popliteal fossa and tarsal tunnel.     Neurological: He is alert and oriented to person, place, and time. He has normal strength. He is not disoriented. He displays no atrophy and no tremor. No sensory deficit. He exhibits normal muscle tone.   Reflex Scores:       Patellar reflexes are 2+ on the right side and 2+ on the left side.       Achilles reflexes are 2+ on the right side and 2+ on the left side.  Negative tinel sign to percussion sural, superficial peroneal, deep peroneal, saphenous, and posterior tibial nerves right and left ankles and feet.       Skin: Skin is warm, dry and intact. No abrasion, no bruising, no burn, no ecchymosis, no laceration, no lesion, no petechiae and no rash noted. He is not diaphoretic. No cyanosis or erythema. No pallor. Nails show no clubbing.   Skin is normal age and health appropriate color, turgor, texture, and temperature bilateral lower extremities without ulceration, hyperpigmentation, discoloration, masses nodules or cords palpated.  No ecchymosis, erythema, edema, or cardinal signs of infection bilateral lower extremities.                 Assessment:       Encounter Diagnoses   Name Primary?    Arthritis of foot Yes    Foot arch pain, unspecified laterality     Hallux abducto valgus, unspecified laterality      Acquired equinus deformity of foot, unspecified laterality          Plan:       Micheal was seen today for foot pain.    Diagnoses and all orders for this visit:    Arthritis of foot  -     X-Ray Foot Complete Bilateral; Future    Foot arch pain, unspecified laterality  -     X-Ray Foot Complete Bilateral; Future    Hallux abducto valgus, unspecified laterality  -     X-Ray Foot Complete Bilateral; Future    Acquired equinus deformity of foot, unspecified laterality  -     X-Ray Foot Complete Bilateral; Future    Other orders  -     methylPREDNISolone (MEDROL DOSEPACK) 4 mg tablet; use as directed      I counseled the patient on his conditions, their implications and medical management.        Patient will stretch the tendo achilles complex three times daily as demonstrated in the office.  Literature was dispensed illustrating proper stretching technique.    I applied a plantar rest strapping to the patient's right and left foot to offload symptomatic area, support the arch, and relieve pain.    Patient will obtain over the counter arch supports and wear them in shoes whenever possible.  Athletic shoes intended for walking or running are usually best.    The patient was advised that NSAID-type medications have two very important potential side effects: gastrointestinal irritation including hemorrhage and renal injuries. He was asked to take the medication with food and to stop if he experiences any GI upset. I asked him to call for vomiting, abdominal pain or black/bloody stools. The patient expresses understanding of these issues and questions were answered.    Discussed conservative treatment with shoes of adequate dimensions, material, and style to alleviate symptoms and delay or prevent surgical intervention.    Rx medrol dose pack, x-rays.          Return in about 1 month (around 3/2/2017).

## 2017-02-17 ENCOUNTER — DOCUMENTATION ONLY (OUTPATIENT)
Dept: FAMILY MEDICINE | Facility: CLINIC | Age: 77
End: 2017-02-17

## 2017-02-17 ENCOUNTER — OFFICE VISIT (OUTPATIENT)
Dept: FAMILY MEDICINE | Facility: CLINIC | Age: 77
End: 2017-02-17
Payer: MEDICARE

## 2017-02-17 VITALS
SYSTOLIC BLOOD PRESSURE: 130 MMHG | WEIGHT: 253.06 LBS | OXYGEN SATURATION: 97 % | BODY MASS INDEX: 33.54 KG/M2 | HEART RATE: 50 BPM | TEMPERATURE: 98 F | RESPIRATION RATE: 16 BRPM | DIASTOLIC BLOOD PRESSURE: 72 MMHG | HEIGHT: 73 IN

## 2017-02-17 DIAGNOSIS — J02.9 SORE THROAT: Primary | ICD-10-CM

## 2017-02-17 DIAGNOSIS — J40 BRONCHITIS: ICD-10-CM

## 2017-02-17 DIAGNOSIS — R06.02 SHORTNESS OF BREATH: ICD-10-CM

## 2017-02-17 LAB
CTP QC/QA: YES
S PYO RRNA THROAT QL PROBE: NEGATIVE

## 2017-02-17 PROCEDURE — 99213 OFFICE O/P EST LOW 20 MIN: CPT | Mod: S$GLB,,, | Performed by: NURSE PRACTITIONER

## 2017-02-17 PROCEDURE — 87880 STREP A ASSAY W/OPTIC: CPT | Mod: ,,, | Performed by: NURSE PRACTITIONER

## 2017-02-17 RX ORDER — PROMETHAZINE HYDROCHLORIDE AND DEXTROMETHORPHAN HYDROBROMIDE 6.25; 15 MG/5ML; MG/5ML
5 SYRUP ORAL EVERY 6 HOURS PRN
Qty: 118 ML | Refills: 0 | Status: SHIPPED | OUTPATIENT
Start: 2017-02-17 | End: 2017-02-27

## 2017-02-17 RX ORDER — BENZONATATE 200 MG/1
200 CAPSULE ORAL 3 TIMES DAILY PRN
Qty: 30 CAPSULE | Refills: 0 | Status: SHIPPED | OUTPATIENT
Start: 2017-02-17 | End: 2017-05-27 | Stop reason: ALTCHOICE

## 2017-02-17 RX ORDER — ALBUTEROL SULFATE 90 UG/1
2 AEROSOL, METERED RESPIRATORY (INHALATION) EVERY 6 HOURS PRN
Qty: 1 EACH | Refills: 11 | Status: SHIPPED | OUTPATIENT
Start: 2017-02-17 | End: 2017-03-21 | Stop reason: SDUPTHER

## 2017-02-17 RX ORDER — AMOXICILLIN AND CLAVULANATE POTASSIUM 875; 125 MG/1; MG/1
1 TABLET, FILM COATED ORAL 2 TIMES DAILY
Qty: 10 TABLET | Refills: 0 | Status: SHIPPED | OUTPATIENT
Start: 2017-02-17 | End: 2017-02-22

## 2017-02-17 NOTE — MR AVS SNAPSHOT
[facility]  [address] [address] [address]  Phone: [phone]  Fax: [phone]                  Micheal Hunt   2017 10:20 AM   Office Visit    Description:  Male : 1940   Provider:  ALLISON Rivera   Department:  [facility]           Reason for Visit     Chills     Cough     Sinus Problem           Diagnoses this Visit        Comments    Sore throat    -  Primary     Shortness of breath         Bronchitis                To Do List           Future Appointments        Provider Department Dept Phone    2017 2:30 PM Pk Lakhani MD [facility] [phone]    3/2/2017 8:30 AM Garth Reyna DPM [facility] [phone]    3/10/2017 8:00 AM [facility] [phone]    3/17/2017 10:30 AM Getachew Dunlap MD [facility] Card[facility] [phone]    2017 8:40 AM Viktoria Chauhan PA-C [facility] [phone]      Goals (5 Years of Data)     None      Follow-Up and Disposition     Return if symptoms worsen or fail to improve in 1 week.    Follow-up and Disposition History       These Medications        Disp Refills Start End    albuterol 90 mcg/actuation inhaler 1 each 2017     Inhale 2 puffs into the lungs every 6 (six) hours as needed for Wheezing. - Inhalation    Pharmacy: [facility]/pharmacy #[id number] - [place] - [address] AT [facility] Ph #: [phone]       amoxicillin-clavulanate 875-125mg (AUGMENTIN) 875-125 mg per tablet 10 tablet 0 2017    Take 1 tablet by mouth 2 (two) times daily. 1 tab po with food bid - Oral    Pharmacy: [facility]/pharmacy #[id number] - [place] - [address] AT [facility] Ph #: [phone]       benzonatate (TESSALON) 200 MG capsule 30 capsule 0 2017     Take 1 capsule (200 mg total) by mouth 3 (three) times daily as needed for Cough. - Oral    Pharmacy:  [facility]/pharmacy #[id number] - [place], [place] - [address] AT [facility] Ph #: [phone]       promethazine-dextromethorphan (PROMETHAZINE-DM) 6.25-15 mg/5 mL Syrp 118 mL 0 2/17/2017 2/27/2017    Take 5 mLs by mouth every 6 (six) hours as needed. - Oral    Pharmacy: [facility]/pharmacy #[id number] - [place] - [address] AT [facility] Ph #: [phone]         [facility] On Call     [facility] On Call Nurse Care Line - 24/7 Assistance  Registered nurses in the [facility] On Call Center provide clinical advisement, health education, appointment booking, and other advisory services.  Call for this free service at [phone].             Medications           Message regarding Medications     Verify the changes and/or additions to your medication regime listed below are the same as discussed with your clinician today.  If any of these changes or additions are incorrect, please notify your healthcare provider.        START taking these NEW medications        Refills    albuterol 90 mcg/actuation inhaler 11    Sig: Inhale 2 puffs into the lungs every 6 (six) hours as needed for Wheezing.    Class: Normal    Route: Inhalation    amoxicillin-clavulanate 875-125mg (AUGMENTIN) 875-125 mg per tablet 0    Sig: Take 1 tablet by mouth 2 (two) times daily. 1 tab po with food bid    Class: Normal    Route: Oral    benzonatate (TESSALON) 200 MG capsule 0    Sig: Take 1 capsule (200 mg total) by mouth 3 (three) times daily as needed for Cough.    Class: Normal    Route: Oral    promethazine-dextromethorphan (PROMETHAZINE-DM) 6.25-15 mg/5 mL Syrp 0    Sig: Take 5 mLs by mouth every 6 (six) hours as needed.    Class: Normal    Route: Oral      STOP taking these medications     methylPREDNISolone (MEDROL DOSEPACK) 4 mg tablet use as directed    albuterol (PROVENTIL) 2.5 mg /3 mL (0.083 %) nebulizer solution Take 3 mLs (2.5 mg total) by nebulization every 6 (six) hours as needed for Wheezing.           Verify that  the below list of medications is an accurate representation of the medications you are currently taking.  If none reported, the list may be blank. If incorrect, please contact your healthcare provider. Carry this list with you in case of emergency.           Current Medications     allopurinol (ZYLOPRIM) 300 MG tablet Take 1 tablet (300 mg total) by mouth once daily.    amlodipine (NORVASC) 10 MG tablet Take 1 tablet (10 mg total) by mouth before dinner.    aspirin (ECOTRIN) 81 MG EC tablet Take 81 mg by mouth once daily.      atorvastatin (LIPITOR) 80 MG tablet Take 0.5 tablets (40 mg total) by mouth once daily.    azelastine (ASTELIN) 137 mcg (0.1 %) nasal spray USE 2 SPRAYS TWICE DAILY IN EACH NOSTRIL    calcium-vitamin D 500-125 mg-unit tablet Take 1 tablet by mouth as needed.     carvedilol (COREG) 6.25 MG tablet Take 1 tablet (6.25 mg total) by mouth 2 (two) times daily with meals.    colchicine 0.6 mg tablet Take 1 tablet (0.6 mg total) by mouth 2 (two) times daily.    cyanocobalamin, vitamin B-12, (VITAMIN B-12) 1,000 mcg Subl Take 1 tablet by mouth daily as needed. Takes 3,000mcg    fexofenadine (ALLEGRA) 180 MG tablet Take 1 tablet (180 mg total) by mouth once daily.    finasteride (PROSCAR) 5 mg tablet Take 1 tablet (5 mg total) by mouth once daily.    fluticasone (FLONASE) 50 mcg/actuation nasal spray 1 spray by Each Nare route once daily.    nitroGLYCERIN (NITROSTAT) 0.4 MG SL tablet Place 1 tablet (0.4 mg total) under the tongue every 5 (five) minutes as needed for Chest pain.    terazosin (HYTRIN) 10 MG capsule Take 1 capsule (10 mg total) by mouth every evening.    tramadol (ULTRAM) 50 mg tablet Take 1 tablet (50 mg total) by mouth every 6 (six) hours as needed.    valsartan (DIOVAN) 320 MG tablet Take 1 tablet (320 mg total) by mouth once daily.    albuterol 90 mcg/actuation inhaler Inhale 2 puffs into the lungs every 6 (six) hours as needed for Wheezing.    amoxicillin-clavulanate 875-125mg  "(AUGMENTIN) 875-125 mg per tablet Take 1 tablet by mouth 2 (two) times daily. 1 tab po with food bid    benzonatate (TESSALON) 200 MG capsule Take 1 capsule (200 mg total) by mouth 3 (three) times daily as needed for Cough.    promethazine-dextromethorphan (PROMETHAZINE-DM) 6.25-15 mg/5 mL Syrp Take 5 mLs by mouth every 6 (six) hours as needed.           Clinical Reference Information           Your Vitals Were     BP Pulse Temp Resp Height Weight    130/72 (BP Location: Left arm, Patient Position: Sitting, BP Method: Manual) 50 98.4 °F (36.9 °C) (Oral) 16 6' 1" (1.854 m) 114.8 kg (253 lb 1.4 oz)    SpO2 BMI             97% 33.39 kg/m2         Blood Pressure          Most Recent Value    BP  130/72      Allergies as of 2/17/2017     Eplerenone      Immunizations Administered on Date of Encounter - 2/17/2017     None      Orders Placed During Today's Visit      Normal Orders This Visit    POCT rapid strep A       Language Assistance Services     ATTENTION: Language assistance services are available, free of charge. Please call [phone].      ATENCIÓN: Si habla samy, tiene a nolan disposición servicios gratuitos de asistencia lingüística. Llame al [phone].     CHÚ Ý: N?u b?n nói Ti?ng Vi?t, có các d?ch v? h? tr? ngôn ng? mi?n phí dành cho b?n. G?i s? [phone].         [facility] complies with applicable Federal civil rights laws and does not discriminate on the basis of race, color, national origin, age, disability, or sex.        "

## 2017-02-17 NOTE — PROGRESS NOTES
Subjective:       Patient ID: Micheal Hunt is a 76 y.o. male.    Chief Complaint: Chills; Cough; and Sinus Problem    Sinus Problem   This is a recurrent problem. The current episode started 1 to 4 weeks ago. The problem has been gradually worsening since onset. Maximum temperature: did not check a temperature. Associated symptoms include chills, congestion, coughing, diaphoresis, headaches, sinus pressure, a sore throat and swollen glands.     Review of Systems   Constitutional: Positive for chills and diaphoresis.   HENT: Positive for congestion, sinus pressure and sore throat.    Respiratory: Positive for cough.    Neurological: Positive for headaches.       Objective:    strep negative  Physical Exam   Constitutional: He appears well-developed and well-nourished. No distress.   HENT:   Head: Normocephalic and atraumatic.   Right Ear: External ear normal.   Left Ear: External ear normal.   Mouth/Throat: No oropharyngeal exudate.   Oropharynx erythematous, nares boggy and purplish.    Eyes: Conjunctivae are normal. Right eye exhibits no discharge. Left eye exhibits no discharge. No scleral icterus.   Neck: Normal range of motion. Neck supple.   Cardiovascular: Normal rate, regular rhythm and normal heart sounds.  Exam reveals no gallop and no friction rub.    No murmur heard.  Pulmonary/Chest: Effort normal and breath sounds normal. No respiratory distress. He has no wheezes. He has no rales.   Lymphadenopathy:     He has cervical adenopathy.   Skin: Skin is warm and dry. He is not diaphoretic.   Psychiatric: He has a normal mood and affect. His behavior is normal.       Assessment:       1. Sore throat    2. Shortness of breath    3. Bronchitis        Plan:       Sore throat  -     POCT rapid strep A    Shortness of breath  -     albuterol 90 mcg/actuation inhaler; Inhale 2 puffs into the lungs every 6 (six) hours as needed for Wheezing.  Dispense: 1 each; Refill: 11    Bronchitis  -     benzonatate  (TESSALON) 200 MG capsule; Take 1 capsule (200 mg total) by mouth 3 (three) times daily as needed for Cough.  Dispense: 30 capsule; Refill: 0  -     promethazine-dextromethorphan (PROMETHAZINE-DM) 6.25-15 mg/5 mL Syrp; Take 5 mLs by mouth every 6 (six) hours as needed.  Dispense: 118 mL; Refill: 0    Other orders  -     amoxicillin-clavulanate 875-125mg (AUGMENTIN) 875-125 mg per tablet; Take 1 tablet by mouth 2 (two) times daily. 1 tab po with food bid  Dispense: 10 tablet; Refill: 0

## 2017-02-17 NOTE — PROGRESS NOTES
Health Maintenance Due   Topic Date Due    TETANUS VACCINE  09/27/1957    Zoster Vaccine  09/27/1999    Pneumococcal (65+) (2 of 2 - PCV13) 01/12/2017

## 2017-03-07 ENCOUNTER — TELEPHONE (OUTPATIENT)
Dept: PHYSICAL MEDICINE AND REHAB | Facility: CLINIC | Age: 77
End: 2017-03-07

## 2017-03-07 NOTE — TELEPHONE ENCOUNTER
----- Message from Nicole Rosario sent at 3/7/2017  8:12 AM CST -----  Contact: self  Appointment Request From: Micheal Hunt         With Provider: Yanira Whitehead DO [[facility]]        Would Accept With:Only the person I've selected        Preferred Date Range: From 3/6/2017 To 3/31/2017        Preferred Times: Any        Reason for visit: Request an Appt        Comments:

## 2017-03-08 ENCOUNTER — OFFICE VISIT (OUTPATIENT)
Dept: PODIATRY | Facility: CLINIC | Age: 77
End: 2017-03-08
Payer: MEDICARE

## 2017-03-08 VITALS — WEIGHT: 256.81 LBS | HEIGHT: 73 IN | BODY MASS INDEX: 34.04 KG/M2

## 2017-03-08 DIAGNOSIS — L60.0 INGROWN NAIL: Primary | ICD-10-CM

## 2017-03-08 DIAGNOSIS — M79.674 TOE PAIN, RIGHT: ICD-10-CM

## 2017-03-08 PROCEDURE — 99999 PR PBB SHADOW E&M-EST. PATIENT-LVL II: CPT | Mod: PBBFAC,,, | Performed by: PODIATRIST

## 2017-03-08 PROCEDURE — 99212 OFFICE O/P EST SF 10 MIN: CPT | Mod: PBBFAC,PO | Performed by: PODIATRIST

## 2017-03-08 PROCEDURE — 99213 OFFICE O/P EST LOW 20 MIN: CPT | Mod: S$PBB,,, | Performed by: PODIATRIST

## 2017-03-08 RX ORDER — UREA 40 %
CREAM (GRAM) TOPICAL DAILY
Qty: 85 G | Refills: 11 | Status: SHIPPED | OUTPATIENT
Start: 2017-03-08 | End: 2017-05-27 | Stop reason: SDDI

## 2017-03-08 NOTE — PROGRESS NOTES
Subjective:      Patient ID: Micheal Hunt is a 76 y.o. male.    Chief Complaint: Foot Pain (right great toe)    Sharp deep pain right great toe at medial hallux nail.  Gradual onset, worsening over past several weeks, aggravated by increased weight bearing, shoe gear, pressure.  No previous medical treatment.  OTC pain med not helping.  Denies trauma and prior surgery right hallux.    Review of Systems   Constitution: Negative for chills, diaphoresis, fever, malaise/fatigue and night sweats.   Cardiovascular: Negative for claudication, cyanosis, leg swelling and syncope.   Skin: Positive for nail changes. Negative for color change, dry skin, rash, suspicious lesions and unusual hair distribution.   Musculoskeletal: Negative for falls, joint pain, joint swelling, muscle cramps, muscle weakness and stiffness.   Gastrointestinal: Negative for constipation, diarrhea, nausea and vomiting.   Neurological: Negative for brief paralysis, disturbances in coordination, focal weakness, numbness, paresthesias, sensory change and tremors.           Objective:      Physical Exam   Constitutional: He is oriented to person, place, and time. He appears well-developed and well-nourished. He is cooperative.   Oriented to time, place, and person.   Cardiovascular:   Pulses:       Dorsalis pedis pulses are 1+ on the right side, and 1+ on the left side.        Posterior tibial pulses are 1+ on the right side, and 1+ on the left side.   Capillary fill time 3-5 seconds.  All toes warm to touch.      Negative lower extremity edema bilateral.    Negative elevational pallor and dependent rubor bilateral.     Musculoskeletal:    Normal angle, base, station of gait. All ten toes without clubbing, cyanosis, or signs of ischemia.  No current pain to palpation bilateral lower extremities.  Range of motion, stability, muscle strength, and muscle tone normal bilateral feet and legs.     Lymphadenopathy:   Negative lymphadenopathy bilateral  popliteal fossa and tarsal tunnel.     Neurological: He is alert and oriented to person, place, and time. He has normal strength. He is not disoriented. He displays no atrophy and no tremor. No sensory deficit. He exhibits normal muscle tone.   Reflex Scores:       Patellar reflexes are 2+ on the right side and 2+ on the left side.       Achilles reflexes are 2+ on the right side and 2+ on the left side.  Negative tinel sign to percussion sural, superficial peroneal, deep peroneal, saphenous, and posterior tibial nerves right and left ankles and feet.       Skin: Skin is warm, dry and intact. No abrasion, no bruising, no burn, no ecchymosis, no laceration, no lesion, no petechiae and no rash noted. He is not diaphoretic. No cyanosis or erythema. No pallor. Nails show no clubbing.   Visible and palpable ingrowth of toenail medial border right hallux with pain to palpation, without ulceration, drainage, pus, tracking, fluctuance, malodor, or cardinal signs infection.    Otherwise, Skin is normal age and health appropriate color, turgor, texture, and temperature bilateral lower extremities without ulceration, hyperpigmentation, discoloration, masses nodules or cords palpated.  No ecchymosis, erythema, edema, or cardinal signs of infection bilateral lower extremities.                 Assessment:       Encounter Diagnoses   Name Primary?    Ingrown nail Yes    Toe pain, right          Plan:       Micheal was seen today for foot pain.    Diagnoses and all orders for this visit:    Ingrown nail    Toe pain, right    Other orders  -     urea (CARMOL) 40 % Crea; Apply topically once daily.      I counseled the patient on his conditions, their implications and medical management.    Discussed nail matrixectomy medial right hallux - patient declines.    Rx urea.    Discussed conservative treatment with shoes of adequate dimensions, material, and style to alleviate symptoms and delay or prevent surgical intervention.            Return if symptoms worsen or fail to improve.

## 2017-03-08 NOTE — MR AVS SNAPSHOT
[facility]  [address] [place] [address]  Phone: [phone]                  Micheal Hunt   3/8/2017 2:00 PM   Office Visit    Description:  Male : 1940   Provider:  Garth Reyna DPM   Department:  [facility]           Reason for Visit     Foot Pain           Diagnoses this Visit        Comments    Ingrown nail    -  Primary     Toe pain, right                To Do List           Future Appointments        Provider Department Dept Phone    3/10/2017 8:00 AM [facility] [phone]    3/10/2017 9:20 AM Yanira Whitehead DO [facility] [phone]    3/17/2017 10:30 AM Getachew Dunlap MD [facility] Cardiology [phone]    2017 8:40 AM Viktoria Chauhan PA-C [facility] [phone]    2017 8:30 AM Pk Lakhani MD [facility] [phone]      Goals (5 Years of Data)     None      Follow-Up and Disposition     Return if symptoms worsen or fail to improve.       These Medications        Disp Refills Start End    urea (CARMOL) 40 % Crea 85 g 11 3/8/2017     Apply topically once daily. - Topical    Pharmacy: [facility]/pharmacy #[id number] - [place], [place] - [address] AT [place] Ph #: [phone]         [facility] On Call     [facility] On Call Nurse Care Line - / Assistance  Registered nurses in the [facility] On Call Center provide clinical advisement, health education, appointment booking, and other advisory services.  Call for this free service at [phone].             Medications           Message regarding Medications     Verify the changes and/or additions to your medication regime listed below are the same as discussed with your clinician today.  If any of these changes or additions are incorrect, please notify your healthcare provider.        START taking these NEW medications        Refills    urea (CARMOL) 40 % Crea 11    Sig: Apply  topically once daily.    Class: Normal    Route: Topical           Verify that the below list of medications is an accurate representation of the medications you are currently taking.  If none reported, the list may be blank. If incorrect, please contact your healthcare provider. Carry this list with you in case of emergency.           Current Medications     albuterol 90 mcg/actuation inhaler Inhale 2 puffs into the lungs every 6 (six) hours as needed for Wheezing.    allopurinol (ZYLOPRIM) 300 MG tablet Take 1 tablet (300 mg total) by mouth once daily.    amlodipine (NORVASC) 10 MG tablet Take 1 tablet (10 mg total) by mouth before dinner.    aspirin (ECOTRIN) 81 MG EC tablet Take 81 mg by mouth once daily.      atorvastatin (LIPITOR) 80 MG tablet Take 0.5 tablets (40 mg total) by mouth once daily.    azelastine (ASTELIN) 137 mcg (0.1 %) nasal spray USE 2 SPRAYS TWICE DAILY IN EACH NOSTRIL    benzonatate (TESSALON) 200 MG capsule Take 1 capsule (200 mg total) by mouth 3 (three) times daily as needed for Cough.    calcium-vitamin D 500-125 mg-unit tablet Take 1 tablet by mouth as needed.     carvedilol (COREG) 6.25 MG tablet Take 1 tablet (6.25 mg total) by mouth 2 (two) times daily with meals.    cyanocobalamin, vitamin B-12, (VITAMIN B-12) 1,000 mcg Subl Take 1 tablet by mouth daily as needed. Takes 3,000mcg    fexofenadine (ALLEGRA) 180 MG tablet Take 1 tablet (180 mg total) by mouth once daily.    finasteride (PROSCAR) 5 mg tablet Take 1 tablet (5 mg total) by mouth once daily.    fluticasone (FLONASE) 50 mcg/actuation nasal spray 1 spray by Each Nare route once daily.    terazosin (HYTRIN) 10 MG capsule Take 1 capsule (10 mg total) by mouth every evening.    tramadol (ULTRAM) 50 mg tablet Take 1 tablet (50 mg total) by mouth every 6 (six) hours as needed.    valsartan (DIOVAN) 320 MG tablet Take 1 tablet (320 mg total) by mouth once daily.    colchicine 0.6 mg tablet Take 1 tablet (0.6 mg total) by mouth 2  "(two) times daily.    nitroGLYCERIN (NITROSTAT) 0.4 MG SL tablet Place 1 tablet (0.4 mg total) under the tongue every 5 (five) minutes as needed for Chest pain.    urea (CARMOL) 40 % Crea Apply topically once daily.           Clinical Reference Information           Your Vitals Were     Height Weight BMI          6' 1" (1.854 m) 116.5 kg (256 lb 13.4 oz) 33.89 kg/m2        Allergies as of 3/8/2017     Eplerenone      Immunizations Administered on Date of Encounter - 3/8/2017     None      Language Assistance Services     ATTENTION: Language assistance services are available, free of charge. Please call [phone].      ATENCIÓN: Si mertla samy, tiene a nolan disposición servicios gratuitos de asistencia lingüística. Llame al [phone].     CHÚ Ý: N?u b?n nói Ti?ng Vi?t, có các d?ch v? h? tr? ngôn ng? mi?n phí dành cho b?n. G?i s? [phone].         [facility] complies with applicable Federal civil rights laws and does not discriminate on the basis of race, color, national origin, age, disability, or sex.        "

## 2017-03-09 ENCOUNTER — TELEPHONE (OUTPATIENT)
Dept: PHYSICAL MEDICINE AND REHAB | Facility: CLINIC | Age: 77
End: 2017-03-09

## 2017-03-10 ENCOUNTER — OFFICE VISIT (OUTPATIENT)
Dept: PHYSICAL MEDICINE AND REHAB | Facility: CLINIC | Age: 77
End: 2017-03-10
Payer: MEDICARE

## 2017-03-10 ENCOUNTER — LAB VISIT (OUTPATIENT)
Dept: LAB | Facility: HOSPITAL | Age: 77
End: 2017-03-10
Attending: INTERNAL MEDICINE
Payer: MEDICARE

## 2017-03-10 VITALS
SYSTOLIC BLOOD PRESSURE: 134 MMHG | BODY MASS INDEX: 33.04 KG/M2 | HEIGHT: 73 IN | WEIGHT: 249.31 LBS | HEART RATE: 75 BPM | DIASTOLIC BLOOD PRESSURE: 80 MMHG

## 2017-03-10 DIAGNOSIS — M25.562 LEFT KNEE PAIN, UNSPECIFIED CHRONICITY: ICD-10-CM

## 2017-03-10 DIAGNOSIS — I25.10 CORONARY ARTERY DISEASE INVOLVING NATIVE HEART WITHOUT ANGINA PECTORIS, UNSPECIFIED VESSEL OR LESION TYPE: ICD-10-CM

## 2017-03-10 DIAGNOSIS — M54.50 CHRONIC BILATERAL LOW BACK PAIN WITHOUT SCIATICA: Primary | ICD-10-CM

## 2017-03-10 DIAGNOSIS — E78.00 PURE HYPERCHOLESTEROLEMIA: ICD-10-CM

## 2017-03-10 DIAGNOSIS — G89.29 CHRONIC BILATERAL LOW BACK PAIN WITHOUT SCIATICA: Primary | ICD-10-CM

## 2017-03-10 LAB
CHOLEST/HDLC SERPL: 4 {RATIO}
CRP SERPL-MCNC: 1.46 MG/L
HDL/CHOLESTEROL RATIO: 25 %
HDLC SERPL-MCNC: 116 MG/DL
HDLC SERPL-MCNC: 29 MG/DL
LDLC SERPL CALC-MCNC: 62.8 MG/DL
NONHDLC SERPL-MCNC: 87 MG/DL
TRIGL SERPL-MCNC: 121 MG/DL

## 2017-03-10 PROCEDURE — 36415 COLL VENOUS BLD VENIPUNCTURE: CPT

## 2017-03-10 PROCEDURE — 99214 OFFICE O/P EST MOD 30 MIN: CPT | Mod: 25,S$PBB,, | Performed by: PHYSICAL MEDICINE & REHABILITATION

## 2017-03-10 PROCEDURE — 20553 NJX 1/MLT TRIGGER POINTS 3/>: CPT | Mod: PBBFAC,PN | Performed by: PHYSICAL MEDICINE & REHABILITATION

## 2017-03-10 PROCEDURE — 20553 NJX 1/MLT TRIGGER POINTS 3/>: CPT | Mod: S$PBB,,, | Performed by: PHYSICAL MEDICINE & REHABILITATION

## 2017-03-10 PROCEDURE — 99213 OFFICE O/P EST LOW 20 MIN: CPT | Mod: PBBFAC,PN | Performed by: PHYSICAL MEDICINE & REHABILITATION

## 2017-03-10 PROCEDURE — 86141 C-REACTIVE PROTEIN HS: CPT

## 2017-03-10 PROCEDURE — 99999 PR PBB SHADOW E&M-EST. PATIENT-LVL III: CPT | Mod: PBBFAC,,, | Performed by: PHYSICAL MEDICINE & REHABILITATION

## 2017-03-10 PROCEDURE — 80061 LIPID PANEL: CPT

## 2017-03-10 RX ORDER — LIDOCAINE HYDROCHLORIDE 10 MG/ML
3 INJECTION INFILTRATION; PERINEURAL ONCE
Status: COMPLETED | OUTPATIENT
Start: 2017-03-10 | End: 2017-03-10

## 2017-03-10 RX ADMIN — LIDOCAINE HYDROCHLORIDE 3 ML: 10 INJECTION INFILTRATION; PERINEURAL at 09:03

## 2017-03-10 NOTE — MR AVS SNAPSHOT
[facility]  [address] [place] [address] [place] [address]  Phone: [phone]  Fax: [phone]                  Micheal Hunt   3/10/2017 9:20 AM   Office Visit    Description:  Male : 1940   Provider:  Yanira Whitehead DO   Department:  [facility] Med[facility]Re[facility]           Reason for Visit     Follow-up     Back Pain           Diagnoses this Visit        Comments    Chronic bilateral low back pain without sciatica    -  Primary     Left knee pain, unspecified chronicity                To Do List           Future Appointments        Provider Department Dept Phone    3/17/2017 10:30 AM Getachew Dunlap MD [facility] [phone]    2017 10:20 AM Yanira Whitehead DO [facility] Med[facility]Rehab [phone]    2017 8:40 AM Viktoria Chauhan PA-C [facility] [phone]    2017 8:30 AM Pk Lakhani MD [facility] [phone]      Goals (5 Years of Data)     None      Follow-Up and Disposition     Return in about 4 weeks (around 2017).      [facility] On Call     [facility] On Call Nurse Care Line -  Assistance  Registered nurses in the [facility] On Call Center provide clinical advisement, health education, appointment booking, and other advisory services.  Call for this free service at [phone].             Medications           Message regarding Medications     Verify the changes and/or additions to your medication regime listed below are the same as discussed with your clinician today.  If any of these changes or additions are incorrect, please notify your healthcare provider.        These medications were administered today        Dose Freq    lidocaine HCL 10 mg/ml (1%) injection 3 mL 3 mL Once    Sig: 3 mLs by Other route once.    Class: Normal    Route: Other           Verify that the below list of medications is an accurate representation of the medications you are currently taking.  If  none reported, the list may be blank. If incorrect, please contact your healthcare provider. Carry this list with you in case of emergency.           Current Medications     albuterol 90 mcg/actuation inhaler Inhale 2 puffs into the lungs every 6 (six) hours as needed for Wheezing.    allopurinol (ZYLOPRIM) 300 MG tablet Take 1 tablet (300 mg total) by mouth once daily.    amlodipine (NORVASC) 10 MG tablet Take 1 tablet (10 mg total) by mouth before dinner.    aspirin (ECOTRIN) 81 MG EC tablet Take 81 mg by mouth once daily.      atorvastatin (LIPITOR) 80 MG tablet Take 0.5 tablets (40 mg total) by mouth once daily.    azelastine (ASTELIN) 137 mcg (0.1 %) nasal spray USE 2 SPRAYS TWICE DAILY IN EACH NOSTRIL    benzonatate (TESSALON) 200 MG capsule Take 1 capsule (200 mg total) by mouth 3 (three) times daily as needed for Cough.    calcium-vitamin D 500-125 mg-unit tablet Take 1 tablet by mouth as needed.     carvedilol (COREG) 6.25 MG tablet Take 1 tablet (6.25 mg total) by mouth 2 (two) times daily with meals.    colchicine 0.6 mg tablet Take 1 tablet (0.6 mg total) by mouth 2 (two) times daily.    cyanocobalamin, vitamin B-12, (VITAMIN B-12) 1,000 mcg Subl Take 1 tablet by mouth daily as needed. Takes 3,000mcg    fexofenadine (ALLEGRA) 180 MG tablet Take 1 tablet (180 mg total) by mouth once daily.    finasteride (PROSCAR) 5 mg tablet Take 1 tablet (5 mg total) by mouth once daily.    fluticasone (FLONASE) 50 mcg/actuation nasal spray 1 spray by Each Nare route once daily.    nitroGLYCERIN (NITROSTAT) 0.4 MG SL tablet Place 1 tablet (0.4 mg total) under the tongue every 5 (five) minutes as needed for Chest pain.    terazosin (HYTRIN) 10 MG capsule Take 1 capsule (10 mg total) by mouth every evening.    tramadol (ULTRAM) 50 mg tablet Take 1 tablet (50 mg total) by mouth every 6 (six) hours as needed.    urea (CARMOL) 40 % Crea Apply topically once daily.    valsartan (DIOVAN) 320 MG tablet Take 1 tablet (320 mg  "total) by mouth once daily.           Clinical Reference Information           Your Vitals Were     BP Pulse Height Weight BMI    134/80 75 6' 1" (1.854 m) 113.1 kg (249 lb 5.4 oz) 32.9 kg/m2      Blood Pressure          Most Recent Value    BP  134/80      Allergies as of 3/10/2017     Eplerenone      Immunizations Administered on Date of Encounter - 3/10/2017     None      Orders Placed During Today's Visit     Future Labs/Procedures Expected by Expires    X-Ray Knee Complete 4 or More Views Left  3/10/2017 3/10/2018    X-Ray Lumbar Spine AP And Lateral  3/10/2017 3/10/2018      Administrations This Visit     lidocaine HCL 10 mg/ml (1%) injection 3 mL     Admin Date Action Dose Route Administered By             03/10/2017 Given 3 mL Other Yanira Whitehead, DO                      Language Assistance Services     ATTENTION: Language assistance services are available, free of charge. Please call [phone].      ATENCIÓN: Si habla samy, tiene a nolan disposición servicios gratuitos de asistencia lingüística. Llame al [phone].     [facility] Ý: N?u b?n nói Ti?ng Vi?t, có các d?ch v? h? tr? ngôn ng? mi?n phí dành cho b?n. G?i s? [phone].         [facility] complies with applicable Federal civil rights laws and does not discriminate on the basis of race, color, national origin, age, disability, or sex.        "

## 2017-03-10 NOTE — PROGRESS NOTES
HPI:  Patient is a 76 y.o. year old male w. Low back pain. He has a h/o ckd 3. He is s/p left tkr *3. He is having severe pain in the left knee. He thinks this worsens his back.    Imaging  Comparison: 01/12/16    Technique: AP standing views of both knees as well as a lateral and Merchant views of the left knee and a Merchant view of the right knee were obtained.    Findings: Left total knee arthroplasty noted.  No hardware loosening or fracture noted.  There is a small left joint effusion.  Atherosclerosis is present bilaterally.  There is tricompartmental osteoarthritis in the right knee which is severe involving the medial compartment and moderate in the lateral and patellofemoral compartments with joint space narrowing and marginal osteophytosis noted.  No focal osseous lesion is seen.  Surgical clip noted in the right medial knee subcutaneous tissues.   Impression     1.  Left total knee arthroplasty without radiographically apparent complication.  Small left knee joint effusion noted.  2.  Moderate to severe, tricompartmental osteoarthritis involving the right knee, most severe in the medial compartment.       Thoracic and lumbar spine    Comparison: Lumbar spine 12/21/2015    Findings: AP, lateral, and swimmer's lateral views of the thoracic spine.  No fracture or malalignment.  Moderate marginal vertebral spurring is present throughout the thoracic spine.    AP, lateral, spot lateral, and bilateral oblique views of the lumbar spine.  No fracture or malalignment.  No pars defect is seen.  Disc heights are well-maintained.  Moderate marginal vertebral spurring is present throughout the lumbar spine along with moderate lower lumbar predominant facet arthropathy.  Vascular calcifications and right upper quadrant surgical clips are seen.   Impression    Moderate thoracolumbar degenerative features.  ______________________________________        Labs  Egfr, dep 24, cr 2.5 ,lft's nl    Past Medical History:    Diagnosis Date    Allergy     Arthritis     Gout    BPH (benign prostatic hyperplasia)     CAD (coronary artery disease) 2006    Chronic kidney disease     due to ibuprofen    Colon polyp     Diverticulosis     GERD (gastroesophageal reflux disease)     HTN (hypertension) 3/27/2012    Hyperlipidemia     LVH (left ventricular hypertrophy)     Murmur, cardiac 3/27/2012    GRICELDA (obstructive sleep apnea)     DOES NOT USE A MACHINE    Sinus problem     Syncope and collapse      Past Surgical History:   Procedure Laterality Date    APPENDECTOMY      CARDIAC CATHETERIZATION      COLONOSCOPY  2011    CORONARY ARTERY BYPASS GRAFT  4/2007    x 1    JOINT REPLACEMENT      left knee total replacement  X 3    mid leftt finger      from a cactuss    MOLE REMOVAL  2016    rotative cuff      no rotative cuffs on bilat shoulders has pins     SHOULDER SURGERY      shoulder surgery bilat  RIGHT X 4; LEFT X 3     Family History   Problem Relation Age of Onset    Heart disease Mother     Sudden death Father     Cancer Father     Stroke Sister     Heart disease Sister     Kidney cancer Neg Hx     Prostate cancer Neg Hx     Urolithiasis Neg Hx      Social History     Social History    Marital status:      Spouse name: N/A    Number of children: N/A    Years of education: N/A     Social History Main Topics    Smoking status: Never Smoker    Smokeless tobacco: Never Used    Alcohol use Yes      Comment: rare    Drug use: No    Sexual activity: Not on file     Other Topics Concern    Not on file     Social History Narrative       Review of patient's allergies indicates:   Allergen Reactions    Eplerenone Other (See Comments)     Marked bradycardia, 40, tiredness and weakness         Current Outpatient Prescriptions:     albuterol 90 mcg/actuation inhaler, Inhale 2 puffs into the lungs every 6 (six) hours as needed for Wheezing., Disp: 1 each, Rfl: 11    allopurinol (ZYLOPRIM) 300 MG  tablet, Take 1 tablet (300 mg total) by mouth once daily., Disp: 90 tablet, Rfl: 3    amlodipine (NORVASC) 10 MG tablet, Take 1 tablet (10 mg total) by mouth before dinner., Disp: 90 tablet, Rfl: 3    aspirin (ECOTRIN) 81 MG EC tablet, Take 81 mg by mouth once daily.  , Disp: , Rfl:     atorvastatin (LIPITOR) 80 MG tablet, Take 0.5 tablets (40 mg total) by mouth once daily., Disp: 45 tablet, Rfl: 3    azelastine (ASTELIN) 137 mcg (0.1 %) nasal spray, USE 2 SPRAYS TWICE DAILY IN EACH NOSTRIL, Disp: , Rfl: 5    benzonatate (TESSALON) 200 MG capsule, Take 1 capsule (200 mg total) by mouth 3 (three) times daily as needed for Cough., Disp: 30 capsule, Rfl: 0    calcium-vitamin D 500-125 mg-unit tablet, Take 1 tablet by mouth as needed. , Disp: , Rfl:     carvedilol (COREG) 6.25 MG tablet, Take 1 tablet (6.25 mg total) by mouth 2 (two) times daily with meals., Disp: 90 tablet, Rfl: 3    colchicine 0.6 mg tablet, Take 1 tablet (0.6 mg total) by mouth 2 (two) times daily., Disp: 10 tablet, Rfl: 3    cyanocobalamin, vitamin B-12, (VITAMIN B-12) 1,000 mcg Subl, Take 1 tablet by mouth daily as needed. Takes 3,000mcg, Disp: , Rfl:     fexofenadine (ALLEGRA) 180 MG tablet, Take 1 tablet (180 mg total) by mouth once daily., Disp: 30 tablet, Rfl: 0    finasteride (PROSCAR) 5 mg tablet, Take 1 tablet (5 mg total) by mouth once daily., Disp: 90 tablet, Rfl: 3    fluticasone (FLONASE) 50 mcg/actuation nasal spray, 1 spray by Each Nare route once daily., Disp: 1 Bottle, Rfl: 0    nitroGLYCERIN (NITROSTAT) 0.4 MG SL tablet, Place 1 tablet (0.4 mg total) under the tongue every 5 (five) minutes as needed for Chest pain., Disp: 25 tablet, Rfl: 4    terazosin (HYTRIN) 10 MG capsule, Take 1 capsule (10 mg total) by mouth every evening., Disp: 180 capsule, Rfl: 3    tramadol (ULTRAM) 50 mg tablet, Take 1 tablet (50 mg total) by mouth every 6 (six) hours as needed., Disp: 120 tablet, Rfl: 4    urea (CARMOL) 40 % Crea, Apply  "topically once daily., Disp: 85 g, Rfl: 11    valsartan (DIOVAN) 320 MG tablet, Take 1 tablet (320 mg total) by mouth once daily., Disp: 90 tablet, Rfl: 3          Review of Systems  No nausea, vomiting, fevers, Chills , contipation, diarrhea or sweats    Physical Exam:      Vitals:    03/10/17 0921   BP: 134/80   Pulse: 75   alert and oriented ×4 follows commands answers all questions appropriately,affect wnl  Manual muscle test 5 out of 5 sensation to light touch grossly intact  +excruciate tenderness b/l QL and paraspinals lumbar  Antalgic gait  No C/C/E  Dec rom of left knee, worse in fwd flexion    Assessment:  Lumbar spondylosis w. Recurrent musc spasm  Knee OA s/p tkr on left    Plan:  Trigger pt injection to low back today  Saphenous nerve block of left knee      pROCEDURE NOTE:  Risk and benefit of trigger point injections given to pt. Injections performed w. A 1.5" 25G needle after sterile prep w. Betadine, verbal consent obtained . NO complications. B/l Quadratus Lumborum  AND ILIOCOSTALIS were inJected with a total of 3ML of 1% Lidocaine        "

## 2017-03-17 ENCOUNTER — OFFICE VISIT (OUTPATIENT)
Dept: CARDIOLOGY | Facility: CLINIC | Age: 77
End: 2017-03-17
Payer: MEDICARE

## 2017-03-17 VITALS
WEIGHT: 244.25 LBS | DIASTOLIC BLOOD PRESSURE: 77 MMHG | SYSTOLIC BLOOD PRESSURE: 144 MMHG | HEART RATE: 67 BPM | HEIGHT: 73 IN | BODY MASS INDEX: 32.37 KG/M2 | OXYGEN SATURATION: 96 %

## 2017-03-17 DIAGNOSIS — E65 ABDOMINAL OBESITY: ICD-10-CM

## 2017-03-17 DIAGNOSIS — I25.10 CORONARY ARTERY DISEASE INVOLVING NATIVE HEART WITHOUT ANGINA PECTORIS, UNSPECIFIED VESSEL OR LESION TYPE: ICD-10-CM

## 2017-03-17 DIAGNOSIS — I10 ESSENTIAL HYPERTENSION: ICD-10-CM

## 2017-03-17 DIAGNOSIS — E78.00 PURE HYPERCHOLESTEROLEMIA: Primary | ICD-10-CM

## 2017-03-17 PROCEDURE — 99214 OFFICE O/P EST MOD 30 MIN: CPT | Mod: PBBFAC,PO | Performed by: INTERNAL MEDICINE

## 2017-03-17 PROCEDURE — 99214 OFFICE O/P EST MOD 30 MIN: CPT | Mod: S$PBB,,, | Performed by: INTERNAL MEDICINE

## 2017-03-17 PROCEDURE — 99999 PR PBB SHADOW E&M-EST. PATIENT-LVL IV: CPT | Mod: PBBFAC,,, | Performed by: INTERNAL MEDICINE

## 2017-03-17 NOTE — MR AVS SNAPSHOT
[facility]  [address], [address]  [place] [address]  Phone: [phone]                  Micheal Hunt   3/17/2017 10:30 AM   Office Visit    Description:  Male : 1940   Provider:  Getachew Dunlap MD   Department:  [facility]           Reason for Visit     Follow-up           Diagnoses this Visit        Comments    Pure hypercholesterolemia    -  Primary     Coronary artery disease involving native heart without angina pectoris, unspecified vessel or lesion type         Essential hypertension         Abdominal obesity                To Do List           Future Appointments        Provider Department Dept Phone    2017 10:20 AM Yanira Whitehead DO [facility] [phone]    2017 8:40 AM Viktoria Chauhan PA-C [facility] [phone]    2017 1:00 PM [facility] [phone]    2017 8:30 AM Pk Lakhani MD [facility] [phone]      Goals (5 Years of Data)     None      Follow-Up and Disposition     Return in about 3 months (around 2017).      [facility] On Call     [facility] - 24/7 Assistance  Registered nurses in the [facility] On Call Center provide clinical advisement, health education, appointment booking, and other advisory services.  Call for this free service at [phone].             Medications           Message regarding Medications     Verify the changes and/or additions to your medication regime listed below are the same as discussed with your clinician today.  If any of these changes or additions are incorrect, please notify your healthcare provider.             Verify that the below list of medications is an accurate representation of the medications you are currently taking.  If none reported, the list may be blank. If incorrect, please contact your healthcare provider. Carry this list with you in case of emergency.            Current Medications     albuterol 90 mcg/actuation inhaler Inhale 2 puffs into the lungs every 6 (six) hours as needed for Wheezing.    allopurinol (ZYLOPRIM) 300 MG tablet Take 1 tablet (300 mg total) by mouth once daily.    amlodipine (NORVASC) 10 MG tablet Take 1 tablet (10 mg total) by mouth before dinner.    aspirin (ECOTRIN) 81 MG EC tablet Take 81 mg by mouth once daily.      atorvastatin (LIPITOR) 80 MG tablet Take 0.5 tablets (40 mg total) by mouth once daily.    azelastine (ASTELIN) 137 mcg (0.1 %) nasal spray USE 2 SPRAYS TWICE DAILY IN EACH NOSTRIL    benzonatate (TESSALON) 200 MG capsule Take 1 capsule (200 mg total) by mouth 3 (three) times daily as needed for Cough.    calcium-vitamin D 500-125 mg-unit tablet Take 1 tablet by mouth as needed.     carvedilol (COREG) 6.25 MG tablet Take 1 tablet (6.25 mg total) by mouth 2 (two) times daily with meals.    colchicine 0.6 mg tablet Take 1 tablet (0.6 mg total) by mouth 2 (two) times daily.    cyanocobalamin, vitamin B-12, (VITAMIN B-12) 1,000 mcg Subl Take 1 tablet by mouth daily as needed. Takes 3,000mcg    fexofenadine (ALLEGRA) 180 MG tablet Take 1 tablet (180 mg total) by mouth once daily.    finasteride (PROSCAR) 5 mg tablet Take 1 tablet (5 mg total) by mouth once daily.    fluticasone (FLONASE) 50 mcg/actuation nasal spray 1 spray by Each Nare route once daily.    terazosin (HYTRIN) 10 MG capsule Take 1 capsule (10 mg total) by mouth every evening.    tramadol (ULTRAM) 50 mg tablet Take 1 tablet (50 mg total) by mouth every 6 (six) hours as needed.    valsartan (DIOVAN) 320 MG tablet Take 1 tablet (320 mg total) by mouth once daily.    nitroGLYCERIN (NITROSTAT) 0.4 MG SL tablet Place 1 tablet (0.4 mg total) under the tongue every 5 (five) minutes as needed for Chest pain.    urea (CARMOL) 40 % Crea Apply topically once daily.           Clinical Reference Information           Your Vitals Were     BP Pulse Height Weight SpO2 BMI    144/77  "(BP Location: Left arm) 67 6' 1" (1.854 m) 110.8 kg (244 lb 4.3 oz) 96% 32.23 kg/m2      Blood Pressure          Most Recent Value    Right Arm BP - Sitting  137/79    Left Arm BP - Sitting  144/77    BP  (!)  144/77      Allergies as of 3/17/2017     Eplerenone      Immunizations Administered on Date of Encounter - 3/17/2017     None      Language Assistance Services     ATTENTION: Language assistance services are available, free of charge. Please call [phone].      ATENCIÓN: Si habla español, tiene a nolan disposición servicios gratuitos de asistencia lingüística. Llame al [phone].     CHÚ Ý: N?u b?n nói Ti?ng Vi?t, có các d?ch v? h? tr? ngôn ng? mi?n phí dành cho b?n. G?i s? [phone].         [facility] complies with applicable Federal civil rights laws and does not discriminate on the basis of race, color, national origin, age, disability, or sex.        "

## 2017-03-17 NOTE — PROGRESS NOTES
Subjective:    Patient ID:  Micheal Hunt is a 76 y.o. male who presents for  of Follow-up  For post CABG, lipid and medication review  PCP: Dr. Calderon, see Q 6 months, now Dr. Bray, also ALLISON Bales, and Dr. Vasquez  Renal: Dr. Rojo  ENT: Dr. Nails  GI: Dr. Harris, Dr. Saleem  Physical medicine: Dr. Whitehead  Urology: Dr. Herring  Nephrologist: Dr. Hodgson  Orthopedic: Dr. Fox in [place], [place], [phone], Dr. Álvarez  Lives alone, daughter, Tonya, on the same ranch, 20 acre, 4 horses, 20 chicken, 3  workers, prior commercial contractor  DaughterCrow, nutritionist in [facility] supportive of Mediterranean diet, and a Yoga instruction.      HPI Comments: Sinhala  ([place]) male, retired cottrell at [employer], here for pre-op evaluation. Significant cardiac history with previous CABG in [place]. Stress test earlier this year was abnormal: Lexiscan -  Nuclear Quantitative Functional Analysis:                                    LVEF: 47 % (normal is 47 - 59)                                               LVED Volume: 195 ml (normal is 91 - 155)                                     LVES Volume: 104 ml (normal is 40 - 78)                                                                                                                   Impression: ABNORMAL MYOCARDIAL PERFUSION                                    1. There is evidence for mild to moderate myocardial ischemia in the         septal wall of the left ventricle, and moderate myocardial ischemia in       the anteroapical wall of the left ventricle with associated stress           induced LV cavity dilatation.                                                2. There is mild intensity fixed defect in the lateral wall of the left      ventricle, consistent with myocardial injury.                                3. There is abnormal wall motion at rest showing severe hypokinesis of       the septal wall of the left ventricle.                                        4. Resting LV function is normal.  (normal is 47 - 59)                       5. The left ventricular volume is moderately increased at rest.              6. The extracardiac distribution of radioactivity is normal.    Subsequent angiogram, 5/2012:  ANGIOGRAPHIC RESULTS:                                                                                                                                  DIAGNOSTIC:                                                                  Patient has a right dominant coronary artery.                                                                                                             - Left Main Coronary Artery:                                                 The LM is occluded. There is JOURDAN 0 flow.                                                                                                                 - Left Anterior Descending Artery:                                           The LAD has luminal irregularities. There is JOURDAN 3 flow. Fed                from the LIMA graft                                                                                                                                       - Left Circumflex Artery:                                                    The LCX was not found.                                                                                                                                    - Right Coronary Artery:                                                     The RCA has luminal irregularities. There is JOURDAN 3 flow.                    appear to be fed from SVG, could not cannulate, poorly visualized.                                                                                        - Aortic Root:                                                               The Aortic Root is normal. There is JOURDAN 3 flow.                             Lesion Details: Moderate proximal tortuosity, mild to                         moderate calcification.                                                                                                                                   - HERRON To LAD:                                                               The LIMA to LAD is normal. There is JOURDAN 3 flow.                                                                                                                                                                                       D. SUMMARY:                                                                                                                                               1. Three vessel coronary artery disease.                                     2. Normal LVEF.                                                              3. Mild aortic insufficiency.                                                4. Very difficult study                                                      5. Multiple attempts to cannulate SVG which appears to go to the RCA.        Native RCA appears to have an ostial occlusion.    Was continued on optimal medical management. Could not tolerate atenolol due to severe weakness. Recently without any cardiac complaints. Limited by left knee with soreness and pains. Went to [facility] in [place], [place] and told of the prior implant was too small and not stable. Anticipating re-op by Dr. Fox, a [employer] graduate, in [place]. [place].    Since visit of 10/2/2012, underwent left TKR with good results, had 16 weeks of rehab without much problem. Here today due to hypertension. Home record showed -185/83-99. Noted more dizziness when the pressures are high. Problem is helped by Meclizine. Have decreased exercise following the operation. Diet said to be no salt. No problem with medications,  need 90 days supply.    Since visit of 4/4, concern about his kidney, see telephone enc on 4/22 with BUN of 28, Scr 1.4, K+ 4.1, and eGFR 50. Old record reviewed, no  significant julian in function since 6/2011. Agree now to try HCTZ 12.5 mg every other day along with increase in lisinopril to 80 mg daily. Home BP reviewed 159-189/85-99. No other new problem. Wants comprehensive blood work the next visit.    Since visit of 12/13, left sided CP is gone, appears to be due to straining while carrying a 34 lbs grandson. Discussed cath report and send to [facility]. Home /80. Daughter feels he is stressed, personally do not feel so. Would like to be back in [place] but not family are here. Ill younger sister in [place]. Biking 3 times a week for 15 to 30 minutes twice a day. Do little weights every day.  Lexiscan, 12/19/2013  Nuclear Quantitative Functional Analysis:   LVEF: 42 % (normal is 47 - 59)  LVED Volume: 193 ml (normal is 91 - 155)  LVES Volume: 112 ml (normal is 40 - 78)    Impression: ABNORMAL MYOCARDIAL PERFUSION  1. There is evidence for mild myocardial ischemia in the anterior and lateral apical walls of the left ventricle with associated stress induced LV cavity dilatation.   2. The perfusion scan is free of evidence for myocardial injury.   3. There is abnormal wall motion at rest showing moderate global hypokinesis of the left ventricle.   4. There is resting LV dysfunction with a reduced ejection fraction of 42 %.  (normal is 47 - 59)  5. The left ventricular volume is moderately increased at rest.   6. The extracardiac distribution of radioactivity is normal.   7. When compared to the previous study from 04/12/2012, the LVEF have decreased with global hypokinesia.  8. [facility] SSS =2 =SDS, suggest that 2.5% of myocardium may be ischemic.    ECHO, 12/2013, CONCLUSIONS     1 - Biatrial enlargement.     2 - Enlarged left ventricular enlargement.     3 - Eccentric hypertrophy.     4 - Low normal to mildly depressed left ventricular systolic function (EF 50-55%).     5 - Left ventricular diastolic dysfunction.     6 - Mild mitral regurgitation.     7 - Normal right  ventricular systolic function .     8 - Mild to moderate aortic regurgitation.     9 - Some improvement in LV function with reduction in AR from echo on 9/29/2012.  Since visit of 5/2, had to go to [place] for sister, 60 year old with a CVA. Stopped HCTZ for 5 weeks due to traveling. No CP but some return of indigestion, previously helped by Prilosec. Used for about 4 weeks with benefit. Discuss Rx 4-8 weeks treatments are reasonable. Blood work on 6/3 LDL is 113, , with HDL of 33. Cr. Remains stable at 1.4 with K+ 4.1. PSA is elevated to > 4 on finasteride 5 mg for past 10 years, prescribed by Urologist in [place].  CBC is normal.    Since visit of 6/11, did not get the referral to Urology, also had recent fever and chills with lower abdominal pain while in [place] last Thursday to Friday, noted some weakened stream. Also at night can hear strong heart beat and take BP showing , would then take an additional 40 mg of lisinopril on top of 80 mg each AM. Blood work showed first time elevation of Scr to 1.6 without change in BUN and normal K+.     Since visit of 7/11, had problem with spironolactone, took only 2 doses and had severe dizziness for 2 days, also had to adjust timing of medications to avoid dizziness after medications. 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 Sleeping well. Other medications are fine, compliant. Blood work showed slight rise in Scr to 1.5-1.6 from 1.4 after spironolactone. Saw Dr. Herring and given antibiotic. BP at home 140-160/80.    Since visit of 8/15, feeling fine, no problem with medications, home BP similar with systolic of 140-150. 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 No problem note, no limits. Using Tylenol 500 mg BID for OA, helpful. Sleeping well, tried Melatonin and now just using warm milk at HS. Blood work reviewed, normal testosterone, uric acid 6.5, BMP with stable Cr  "of 1.5 with FBS of 103, Lipid panel shows LDL of 78.8 on 80 mg of atorvastatin.     Since visit of 9/17, saw Dr. Calderon for pre-op evaluation and suggested sooner follow up for abnormal stress test. Denies any CP but with occasional chest "congestion" with pleuritic CP with deep breathing, Occurs now and then, sometime related to heavy exertion. Helped with breathing Rx in hospital and albuterol inhaler at home. Last spell about a month ago, lasted for 30 minutes. Had OP left operation on 11/25, no problem. Since home no problem. CXR on 11/6 was normal. Recent labs - LDL 86, HDL 36, normal CMP and CBC.  Serum Cr 1.4, eGFR 49.1, stable. Request nebulizer for home use.    Since visit of 12/10, next morning woke up with high BP, 184/102, felt dizzy, no fall, then a constant heart "hurting", grade 5/10, seems to increase after meal, tried Rolaids and Tums without much help. Pain constant til now. Call answering service at 6 AM and advised to come to office for EKG and evaluation. EKG NSR with frequent APC, rate of 68, LVH with STT abnormalities, no acute change. Discussed atypical presentation of heart pains and also possible GI source of problem. Have not seen any GI specialist for his GERD. Also discussed use of NTG for chest pains. BP at home this AM, 174/100, obviously distressed.    Since visit of 1/9/2014, continue with some back pain over past 6 months, concern possibly due to high dose atorvastatin. Also noted some fatigue with palpitation in the middle of the night, have to get up 3 times for nocturia. No CP but BP elevated in the middle of night to 140/92. Last Holter in 4/2012: PREDOMINANT RHYTHM  1. Sinus rhythm with heart rates varying between 40 and 195 bpm with an average of 77 bpm.     VENTRICULAR ARRHYTHMIAS  1. There were occasional PVCs totalling 290 and averaging 12 per hour.  There were 2 couplets.    2. There were no episodes of ventricular tachycardia.    SUPRA VENTRICULAR ARRHYTHMIAS  1. There " were very frequent PACs totalling 5254 and averaging 228 per hour.  There were 143 couplets.    2. There were no episodes of sustained supraventricular tachycardia.    SINUS NODE FUNCTION  1. There was no evidence of high grade SA nicolás block.     AV CONDUCTION  1. There was no evidence of high grade AV block.     DIARY  1. The diary was not returned    MISCELLANEOUS  1. This was a tape of adequate length (23 hrs).    Also worries about friend in [place] dying without a definite cause. Some worries of kidney and liver due to medications. Last labs in 11/2013 reviewed.     Since visit of 3/17, no new problem, no further CP,   Holter done without symptoms - PREDOMINANT RHYTHM  1. Sinus arrhythmia with heart rates varying between 41 and 164 bpm with an average of 79 bpm.     VENTRICULAR ARRHYTHMIAS  1. There were occasional mostly monomorphic PVCs totalling 272 and averaging 11 per hour.  There was 1 couplet.    2. There were no episodes of ventricular tachycardia.    SUPRA VENTRICULAR ARRHYTHMIAS  1. There were frequent PACs totalling 1748 and averaging 72 per hour.  There were 9 bigeminal cycles.  There were 47 couplets. There were 5 triplets.     2. 1.5% of complexes.    3. There were no episodes of sustained supraventricular tachycardia.    SINUS NODE FUNCTION  1. There was no evidence of high grade SA nicolás block.     AV CONDUCTION  1. There was no evidence of high grade AV block.     2. The longest RR interval was 2110 msec.     DIARY  1. The diary was returned, but not completed    MISCELLANEOUS  1. This was a tape of adequate length (24 hrs).    Labs showed new glucose intolerance, , admit to using lot of sugar recently. CK is elevated with back pain. Last Lipid in 11/2013 LDL-C was 86.4. Sleep evaluation next month.     Since visit of 3/28/2014, stressed due to close sister illnesses with Alzheimer in [place], stayed there for 8 weeks and recent return with weight gain. No definite muscular pains still with mild  chronic low back pain. CP is better, occasional burning.  Home BP reviewed, mostly > 150/80, have occasional HA, every other week. Using HCTZ 25 mg , half tab every other day. Not yet to sleep evaluation. Discussed potential cause of resistant HTN due to untreated GRICELDA.  Labs reviewed CPK remains elevated 362 to 421, FBS improved from 127 to 110. Renal stable with Cr 1.6 to 1.5, K+ 4.1. Lipid LDL-C 69.8. Discussed optional of trying 10 mg of atorvastatin or continuing on 40 mg and be aware of muscle pains / weakness and to monitor CPK.    Since visit of 6/26, no new problem, labs showed slow progressive CKD eGFR now 40, Cr 1.7, BUN 25, . Want to see nephrologist.  Had Sleep study on 6/30 - CONCLUSION:  Nocturnal polysomnography demonstrates:  1.  Obstructive sleep apnea to be present with 44.2 events an hour with    desaturation to 81%.  2.  Sinus rhythm with occasional PVC.     PLAN:  He will return for nasal CPAP titration at a later date.    Since visit of 7/25, OK til 3 weeks ago, started to experience ARMAS, any rushing, similar to prior to CABG. Some CP, more like burning, grade 1/10, last until relax. ECG today SA, rate 56. 1st degree AVB, LVH with ST abnormalities. Called for earlier appointment. No problem with the medications. Just started CPAP this past Sunday, 3 days ago. Last lab again reviewed - K+ 4.1. Had prior problem with spironolactone. Home /95.     Since visit of 9/10, the CP and ARMAS have improved. Could not tolerate eplerenone 25 mg due to marked bradycardia, rate to 40 along with tiredness and weakness, not the expected side effects. Home /79, feeling better except for dry cough, nasal congestion, and bad HA, recurrent problem over the years. Best Rx with short course of Augmentin. Given Doxycycline without benefit. Lab today , BMP with Cr stable at 1.6, K+ 4.0. Also wants review with GI on GERD, and not able to lose weight.    Since visit of 9/24/2014, no new problem,  no problem with medications. GRICELDA reviewed, troubled by being awaken hourly during testing. Denies any fatigue, lots of energy. Labs - stable CKD eGFR 42, , K+ 4.1. Last urine 6/2013. Home BP good at 140/80-85. 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     Since visit of 12/5/2014, had problem with diverticulitis last month now corrected diet, no nuts, more fiber. Denies any CP nor SOB. No sleepiness. Weight up and down. Recent labs A1C 6.2%, LDL-C 59, Hgb 13.3, CMP showed eGFR 39 with Cr 1.7, K+ 3.8.    Since visit of 3/19/2015, no new problem. Had review with Dr. Álvarez, X-ray negative and completed 6 weeks of PT without much benefit. Being closely followed by Dr. Hodgson, recent labs shows eGFR 39, Cr 1.7, have proteinuria, , mild anemia, Hgb 13.4, iron profile is sufficient. Lipid excellent, LDL 58, non-HDL 89.    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 Today discomfort started after 3 zandra, had to stop for 20 minutes, did not use NTG. Similar problem over the last 8 months. Compliant with medications but home BP are high, 130-188/, HR 65-85. ECG shows NSR PAC, LVH, pulmonary pattern. Last lipid in 8/2015 LDL 58.2, non-HDL 90. Active biking twice a week for 30 minutes then farm walk daily.    Since visit of 2/18, continue to have occasional chest burning when rushing fast across pasture or lifting heavy bags. Has about 5 minutes of discomfort, grade 1/10. Also noted some nighttime dry cough, don't believe due to Lisinopril, like nasal congestion with PND. Daughter have Zyrtec, will try. Discussed options for Rx of Abnormal stress test, had difficult LHC in 2012 along with stage 3 CKD. Will proceed with optimize medical Rx first.   ECHO, 3/2016 CONCLUSIONS     1 - Biatrial enlargement.     2 - Enlarged left ventricular enlargement.     3 - Eccentric hypertrophy.     4 - Low normal to mildly depressed left  ventricular systolic function (EF 50-55%).     5 - Mild to moderate aortic regurgitation.     6 - Mild mitral regurgitation.     7 - Left ventricular diastolic dysfunction. E/e'(lat) is 10.  This along with the following abnormalities (ATUL = 49.13 and LVMI = 181.70) suggests significant diastolic dysfunction.     8 - Right ventricular enlargement with mildly depressed systolic function.     9 - The estimated PA systolic pressure is 28 mmHg.     10 - No significant change from Echo on 12/19/2013.     Lexiscan - Nuclear Quantitative Functional Analysis:   LVEF: 34 % (normal is 47 - 59)  LVED Volume: 192 ml (normal is 91 - 155)  LVES Volume: 126 ml (normal is 40 - 78)    Impression: ABNORMAL MYOCARDIAL PERFUSION  1. There is evidence for moderate myocardial ischemia in the inferolateral wall of the left ventricle with associated stress induced LV cavity dilatation with underlying injury present.   2. There is abnormal wall motion at rest showing moderate global hypokinesis of the left ventricle. severe hypokinesis of the inferolateral wall of the left ventricle.   3. There is resting LV dysfunction with a reduced ejection fraction of 34 %.  (normal is 47 - 59)  4. The left ventricular volume is mildly increased at rest.   5. The extracardiac distribution of radioactivity is normal.   6. When compared to the previous study from 12/19/2013, current study indicate inferolateral defects.  7. [facility] SSS=10, SRS=5, and SDS=5, suggest that 7% of myocardium may be ischemic.    Since visit of 3/14, feeling better, wanted no change with medications, long discussion on use of Coreg and need for titration. Also labs reviewed, mild anemia due to CKD, stage 3, eGFR 33, decreased from 40, Dr. Hodgson recommend better cholesterol control. , and non- on 80 mg of Atorvastatin. Want better diet.    Since visit of 4/25 had problem on 5/2/2016 with sudden onset of vertigo and right ear pain. Had review with Dr. Vasquez  "and medications changes recommended see telephone encounter note. Now review medications again and vertigo improved after taking Meclizine 4-25 mg tabs daily. Home BP log 127-170/, HR 63-82. Currently back on Coreg 3.125 mg bid. Discuss hope to proceed with Coreg titration with the goal of 25 mg bid.    In 6/2016, had tangled with the trees with review by Dr. Vasquez, right ribs bruised, no fracture, also vertigo with quick turn, fell. Still with some exertional chest burning, average twice a week, but very brief, just seconds. No problem with medications. Had review with Dr. Nails.    In 7/2016, multiple complains, generalized itching started about a week ago, stopped Coreg but itching still there. Also problem with recurrent vertigo exacerbated by head turning or bending, no fall but bothersome, able to do all farm chores. In addition noted to be more tired with the higher dose of Coreg. No SOB nor CP. Recent labs show NICK with Cr 2.2 to 2.5, eGFR down to 24. Will be seeing nephrologist 8/2/2016.    In 12/2016, problem with acute head congestion, frontal, seasonal, usually helped with Augmentin for 10 days. Requesting Rx, option discussed. Feeling "good" in general, able to do work without problem. Ran out of Zetia over a month ago. Lipid test LDL 84.4 on 40 mg of atorvastatin. Home /75-80. Dr. Vasquez had changed ACE-I to Valsartan due to cough.    In 3/2017, find a little tiredness and daytime sleepiness over the last 2 weeks, change to daylight saving made a little worst. 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 Sleep well, 6-8 hours, feel good on awakening. Home /80. Compliant with medications. Some concern of more frequent BM, 4-5 times daily. Eating more fruit. Lipid LDL 62.    Review of Systems      Constitutional: no further chills, fevers, and sweats and recurrent afternoon fatigue, no further head congestion, weight loss of 6 lbs since last visit.  Eyes: negative for " "icterus, redness and visual disturbance  Respiratory: negative for asthma, no further cough with seasonal sinusitis, no dyspnea on exertion, no hemoptysis, pleurisy/chest pain and wheezing, Logan score 0  Cardiovascular: no further chest pain, chest pressure/discomfort, dyspnea, near-syncope, no further nightly palpitations with less occasional /90, no paroxysmal nocturnal dyspnea and syncope  Gastrointestinal: negative for abdominal pain, nausea and vomiting, no further heart burn on exertion  Musculoskeletal: No muscle weakness and myalgias, positive for arthralgias, left knee not as good, benefited from operation  Neurological: negative for coordination problems, no further dizziness after medications, takes in interval, no gait problems, less headaches, likely sinus, no paresthesia, tremors and vertigo, sleeping 8 hours nightly.  Psych: no depression nor anxious, close sister (71 year old) passed in [place] 1/2016.    Objective:    Physical Exam     Constitutional: He appears healthy. No distress.   HENT:   Mouth/Throat: Dentition is normal.   Eyes: Conjunctivae are normal.   Neck: Thyroid normal. Neck supple.   Cardiovascular: Normal rate, regular rhythm and normal pulses. PMI is not displaced. Exam reveals no gallop.   Medium-pitched machinery crescendo-decrescendo early systolic murmur is present with a grade of 3/6 at the upper right sternal border, upper left sternal border and apex   Pulses:   Carotid pulses are 2+ on the right side, and 2+ on the left side.   Dorsalis pedis pulses are 2+ on the right side, and 2+ on the left side.   Pulmonary/Chest: Breath sounds normal. He exhibits no tenderness.   Abdominal: Soft, very active bowel sounds are normal. Waist 44.5" increased to 46.5"  Musculoskeletal: He exhibits trace pitting edema around the sock line.   Neurological: He is alert and oriented to person, place, and time. Gait normal.   Skin: Skin is warm and dry. No cyanosis. No pallor. Nails " show no clubbing.     Assessment:       1. Pure hypercholesterolemia    2. Coronary artery disease involving native heart without angina pectoris, unspecified vessel or lesion type    3. Essential hypertension    4. Abdominal obesity         Plan:   Micheal was seen today for follow-up.    Diagnoses and associated orders for this visit:    Pure hypercholesterolemia    Coronary artery disease involving native heart without angina pectoris, unspecified vessel or lesion type    Essential hypertension    Abdominal obesity    - CV status stable, continue current Rx, all medications reviewed, patient acknowledge good understanding.   Instruction for Mediterranean diet and heart healthy exercise given.  - Weigh twice weekly, try to lose 1-2 lbs per week  - Belly exercise daily  - Follow up in 3 months per patient's preference    LV dysfunction, EF 50%, reduced to 34%  -     Reduce carvedilol (COREG) 6.25 MG tablet; Take 1 tablet (6.25 mg total) by mouth 2 (two) times daily with meals.  Dispense: 90 tablet; Refill: 3    Vertigo - felt may be due to chronic sinusitis    Chronic fatigue - improved    LVH (left ventricular hypertrophy) due to hypertensive disease, without heart failure    Benign non-nodular prostatic hyperplasia without lower urinary tract symptoms  -     Change terazosin (HYTRIN) 10 MG capsule; Take 2 capsules (20 mg total) by mouth every evening.  Dispense: 180 capsule; Refill: 3     NICK     Angina of effort, onset 6/2015 - resolved    Abnormal cardiovascular stress test    Obesity, Class I, BMI 32.8 to 34.3, today 32.2    CKD (chronic kidney disease), stage III, eGFR 40, 7/2014  - Check home blood pressure, 2 days weekly, do 2 readings within 5 minutes in AM and PM, keep log for review.    Proteinuria    Sleep disorder - improved    Diastolic dysfunction    ARMAS (dyspnea on exertion) - improved    Anemia, mild    Abdominal obesity    OA, post left TKR      Patient Active Problem List   Diagnosis     Osteoarthritis of right knee, L-TKR 10/2012    Nocturia    Hematuria    BPH (benign prostatic hyperplasia)    Carotid stenosis    HTN (hypertension),     Hyperlipidemia, baseline     CAD (coronary artery disease), 2V CABG 2007    Gout, arthritis    Obesity, Class I, BMI 32.8 to 34.3, 32.7, up to 33,3    Vertigo    Tinea pedis    LVH (left ventricular hypertrophy) due to hypertensive disease    Abnormal cardiovascular stress test    CKD (chronic kidney disease), stage III, eGFR 40, 7/2014    GERD (gastroesophageal reflux disease)    Elevated PSA    Sleep disorder    Acquired hammer toe    Diastolic dysfunction    Abdominal obesity    Fatigue    Back pain, chronic    Glucose intolerance (impaired glucose tolerance)    ARMAS (dyspnea on exertion)    Anemia, mild    Gastric nodule    Diverticulitis, 2005, 2015    Personal history of colonic polyps    PSA elevation    Angina of effort, onset 6/2015    DDD (degenerative disc disease), lumbar    LV dysfunction, EF 50%, reduced to 34%    Other spondylosis, lumbar region    Knee pain    NICK (acute kidney injury)    Hypotension due to drugs     Total face-to-face time with the patient was 30 minutes and greater than 50% was spent in counseling and coordination of care. The above assessment and plan have been discussed at length. Labs and procedure reviewed. Problem List updated. Asked to bring in all active medications / pills bottles with next visit.

## 2017-03-18 ENCOUNTER — PATIENT MESSAGE (OUTPATIENT)
Dept: FAMILY MEDICINE | Facility: CLINIC | Age: 77
End: 2017-03-18

## 2017-03-20 DIAGNOSIS — R20.2 PARESTHESIA OF RIGHT FOOT: ICD-10-CM

## 2017-03-20 RX ORDER — GABAPENTIN 100 MG/1
CAPSULE ORAL
Qty: 150 CAPSULE | Refills: 5 | Status: SHIPPED | OUTPATIENT
Start: 2017-03-20 | End: 2017-04-26

## 2017-03-21 ENCOUNTER — DOCUMENTATION ONLY (OUTPATIENT)
Dept: FAMILY MEDICINE | Facility: CLINIC | Age: 77
End: 2017-03-21

## 2017-03-21 ENCOUNTER — OFFICE VISIT (OUTPATIENT)
Dept: FAMILY MEDICINE | Facility: CLINIC | Age: 77
End: 2017-03-21
Payer: MEDICARE

## 2017-03-21 VITALS
SYSTOLIC BLOOD PRESSURE: 115 MMHG | OXYGEN SATURATION: 97 % | TEMPERATURE: 98 F | BODY MASS INDEX: 33.34 KG/M2 | WEIGHT: 251.56 LBS | HEIGHT: 73 IN | RESPIRATION RATE: 16 BRPM | DIASTOLIC BLOOD PRESSURE: 69 MMHG | HEART RATE: 65 BPM

## 2017-03-21 DIAGNOSIS — J20.9 ACUTE BRONCHITIS, UNSPECIFIED ORGANISM: Primary | ICD-10-CM

## 2017-03-21 DIAGNOSIS — R06.02 SHORTNESS OF BREATH: ICD-10-CM

## 2017-03-21 PROCEDURE — 99213 OFFICE O/P EST LOW 20 MIN: CPT | Mod: S$GLB,,, | Performed by: INTERNAL MEDICINE

## 2017-03-21 RX ORDER — MECLIZINE HYDROCHLORIDE 25 MG/1
1 TABLET ORAL DAILY PRN
COMMUNITY
Start: 2017-03-20 | End: 2017-05-10

## 2017-03-21 RX ORDER — ALBUTEROL SULFATE 90 UG/1
2 AEROSOL, METERED RESPIRATORY (INHALATION) EVERY 6 HOURS PRN
Qty: 1 EACH | Refills: 11 | Status: ON HOLD | OUTPATIENT
Start: 2017-03-21 | End: 2017-05-31

## 2017-03-21 RX ORDER — PREDNISONE 20 MG/1
40 TABLET ORAL DAILY
Qty: 10 TABLET | Refills: 0 | Status: SHIPPED | OUTPATIENT
Start: 2017-03-21 | End: 2017-03-31

## 2017-03-21 NOTE — PATIENT INSTRUCTIONS
Afrin nasal spray, only use for 3 days maximum.    Mucinex DM    Cool mist vaporizor.  Hot fluids. Hot tea with lemon and honey.

## 2017-03-21 NOTE — MR AVS SNAPSHOT
[facility]  [address] [address] [address]  Phone: [phone]  Fax: [phone]                  Micheal Hunt   3/21/2017 11:20 AM   Office Visit    Description:  Male : 1940   Provider:  Rosendo Vasquez MD   Department:  [facility]           Reason for Visit     Cough     Sore Throat     Sinus Problem           Diagnoses this Visit        Comments    Acute bronchitis, unspecified organism    -  Primary     Shortness of breath                To Do List           Future Appointments        Provider Department Dept Phone    2017 10:20 AM Yanira Whitehead DO [facility] [phone]    2017 8:40 AM Viktoria Chauhan PA-C [facility] [phone]    2017 1:00 PM Getachew Dunlap MD [facility] [phone]    2017 8:30 AM Pk Lakhani MD [facility] [phone]      Goals (5 Years of Data)     None      Follow-Up and Disposition     Return for if you are not better return in 2 weeks.    Follow-up and Disposition History       These Medications        Disp Refills Start End    predniSONE (DELTASONE) 20 MG tablet 10 tablet 0 3/21/2017 3/31/2017    Take 2 tablets (40 mg total) by mouth once daily. - Oral    Pharmacy: [facility]/pharmacy #[id number] - [place], [place] - [address] AT [facility] Ph #: [phone]       albuterol 90 mcg/actuation inhaler 1 each 11 3/21/2017     Inhale 2 puffs into the lungs every 6 (six) hours as needed for Wheezing. - Inhalation    Pharmacy: [facility]/pharmacy #[id number] - [place] - [address] AT [facility] Ph #: [phone]       inhalation device (AEROCHAMBER PLUS FLOW-VU) 1 Device 0 3/21/2017     Use as directed for inhalation.    Pharmacy: [facility]/pharmacy #[id number] - [place] - [address] AT [facility] Ph #: [phone]         [facility]     [facility] -  24/7 Assistance  Registered nurses in the [facility] provide clinical advisement, health education, appointment booking, and other advisory services.  Call for this free service at [phone].             Medications           Message regarding Medications     Verify the changes and/or additions to your medication regime listed below are the same as discussed with your clinician today.  If any of these changes or additions are incorrect, please notify your healthcare provider.        START taking these NEW medications        Refills    predniSONE (DELTASONE) 20 MG tablet 0    Sig: Take 2 tablets (40 mg total) by mouth once daily.    Class: Normal    Route: Oral    inhalation device (AEROCHAMBER PLUS FLOW-VU) 0    Sig: Use as directed for inhalation.    Class: Normal           Verify that the below list of medications is an accurate representation of the medications you are currently taking.  If none reported, the list may be blank. If incorrect, please contact your healthcare provider. Carry this list with you in case of emergency.           Current Medications     albuterol 90 mcg/actuation inhaler Inhale 2 puffs into the lungs every 6 (six) hours as needed for Wheezing.    allopurinol (ZYLOPRIM) 300 MG tablet Take 1 tablet (300 mg total) by mouth once daily.    amlodipine (NORVASC) 10 MG tablet Take 1 tablet (10 mg total) by mouth before dinner.    aspirin (ECOTRIN) 81 MG EC tablet Take 81 mg by mouth once daily.      atorvastatin (LIPITOR) 80 MG tablet Take 0.5 tablets (40 mg total) by mouth once daily.    azelastine (ASTELIN) 137 mcg (0.1 %) nasal spray USE 2 SPRAYS TWICE DAILY IN EACH NOSTRIL    benzonatate (TESSALON) 200 MG capsule Take 1 capsule (200 mg total) by mouth 3 (three) times daily as needed for Cough.    calcium-vitamin D 500-125 mg-unit tablet Take 1 tablet by mouth as needed.     carvedilol (COREG) 6.25 MG tablet Take 1 tablet (6.25 mg total) by mouth 2 (two) times daily with  "meals.    colchicine 0.6 mg tablet Take 1 tablet (0.6 mg total) by mouth 2 (two) times daily.    cyanocobalamin, vitamin B-12, (VITAMIN B-12) 1,000 mcg Subl Take 1 tablet by mouth daily as needed. Takes 3,000mcg    fexofenadine (ALLEGRA) 180 MG tablet Take 1 tablet (180 mg total) by mouth once daily.    finasteride (PROSCAR) 5 mg tablet Take 1 tablet (5 mg total) by mouth once daily.    fluticasone (FLONASE) 50 mcg/actuation nasal spray 1 spray by Each Nare route once daily.    gabapentin (NEURONTIN) 100 MG capsule 1 by mouth in the morning 1 by mouth in the afternoon and 3 by mouth at bedtime    meclizine (ANTIVERT) 25 mg tablet Take 1 tablet by mouth daily as needed.    terazosin (HYTRIN) 10 MG capsule Take 1 capsule (10 mg total) by mouth every evening.    tramadol (ULTRAM) 50 mg tablet Take 1 tablet (50 mg total) by mouth every 6 (six) hours as needed.    urea (CARMOL) 40 % Crea Apply topically once daily.    valsartan (DIOVAN) 320 MG tablet Take 1 tablet (320 mg total) by mouth once daily.    inhalation device (AEROCHAMBER PLUS FLOW-VU) Use as directed for inhalation.    nitroGLYCERIN (NITROSTAT) 0.4 MG SL tablet Place 1 tablet (0.4 mg total) under the tongue every 5 (five) minutes as needed for Chest pain.    predniSONE (DELTASONE) 20 MG tablet Take 2 tablets (40 mg total) by mouth once daily.           Clinical Reference Information           Your Vitals Were     BP Pulse Temp Resp Height Weight    115/69 (BP Location: Left arm, Patient Position: Sitting, BP Method: Automatic) 65 98 °F (36.7 °C) (Oral) 16 6' 1" (1.854 m) 114.1 kg (251 lb 8.7 oz)    SpO2 BMI             97% 33.19 kg/m2         Blood Pressure          Most Recent Value    BP  115/69      Allergies as of 3/21/2017     Eplerenone      Immunizations Administered on Date of Encounter - 3/21/2017     None      Instructions    Afrin nasal spray, only use for 3 days maximum.    Mucinex DM    Cool mist vaporizor.  Hot fluids. Hot tea with lemon and " honey.         Language Assistance Services     ATTENTION: Language assistance services are available, free of charge. Please call [phone].      ATENCIÓN: Si habla samy, tiene a nolan disposición servicios gratuitos de asistencia lingüística. Llame al [phone].     CHÚ Ý: N?u b?n nói Ti?ng Vi?t, có các d?ch v? h? tr? ngôn ng? mi?n phí dành cho b?n. G?i s? [phone].         [facility] complies with applicable Federal civil rights laws and does not discriminate on the basis of race, color, national origin, age, disability, or sex.

## 2017-03-24 ENCOUNTER — TELEPHONE (OUTPATIENT)
Dept: FAMILY MEDICINE | Facility: CLINIC | Age: 77
End: 2017-03-24

## 2017-03-24 ENCOUNTER — PATIENT MESSAGE (OUTPATIENT)
Dept: CARDIOLOGY | Facility: CLINIC | Age: 77
End: 2017-03-24

## 2017-03-24 DIAGNOSIS — E78.5 HYPERLIPIDEMIA, UNSPECIFIED HYPERLIPIDEMIA TYPE: ICD-10-CM

## 2017-03-24 DIAGNOSIS — I10 ESSENTIAL HYPERTENSION: ICD-10-CM

## 2017-03-24 RX ORDER — ATORVASTATIN CALCIUM 80 MG/1
40 TABLET, FILM COATED ORAL DAILY
Qty: 45 TABLET | Refills: 3 | Status: SHIPPED | OUTPATIENT
Start: 2017-03-24 | End: 2017-04-06 | Stop reason: SDUPTHER

## 2017-03-24 RX ORDER — AMLODIPINE BESYLATE 10 MG/1
10 TABLET ORAL
Qty: 90 TABLET | Refills: 3 | Status: SHIPPED | OUTPATIENT
Start: 2017-03-24 | End: 2018-03-26 | Stop reason: SDUPTHER

## 2017-03-24 NOTE — TELEPHONE ENCOUNTER
----- Message from Dany Ashton sent at 3/23/2017  4:41 PM CDT -----  Contact: pt  Pt is requesting a callback, not feeling well after taking medications  Call Back#[phone] or   Thanks

## 2017-03-24 NOTE — TELEPHONE ENCOUNTER
I've already had the nurse call the patient only needs to be seen or go to the ER.  Now it's the weekend.  He needs to the ER.  I did try to call the patient to [place] but got an answering machine.

## 2017-03-25 ENCOUNTER — OFFICE VISIT (OUTPATIENT)
Dept: FAMILY MEDICINE | Facility: CLINIC | Age: 77
End: 2017-03-25
Payer: MEDICARE

## 2017-03-25 VITALS
DIASTOLIC BLOOD PRESSURE: 75 MMHG | SYSTOLIC BLOOD PRESSURE: 131 MMHG | TEMPERATURE: 98 F | BODY MASS INDEX: 33.6 KG/M2 | WEIGHT: 253.5 LBS | RESPIRATION RATE: 20 BRPM | HEART RATE: 72 BPM | HEIGHT: 73 IN

## 2017-03-25 DIAGNOSIS — J32.9 SINUSITIS, UNSPECIFIED CHRONICITY, UNSPECIFIED LOCATION: Primary | ICD-10-CM

## 2017-03-25 PROCEDURE — 99999 PR PBB SHADOW E&M-EST. PATIENT-LVL III: CPT | Mod: PBBFAC,,, | Performed by: FAMILY MEDICINE

## 2017-03-25 PROCEDURE — 99213 OFFICE O/P EST LOW 20 MIN: CPT | Mod: PBBFAC,PO | Performed by: FAMILY MEDICINE

## 2017-03-25 PROCEDURE — 96372 THER/PROPH/DIAG INJ SC/IM: CPT | Mod: PBBFAC,PO

## 2017-03-25 PROCEDURE — 99213 OFFICE O/P EST LOW 20 MIN: CPT | Mod: S$PBB,,, | Performed by: FAMILY MEDICINE

## 2017-03-25 RX ORDER — DEXAMETHASONE SODIUM PHOSPHATE 4 MG/ML
4 INJECTION, SOLUTION INTRA-ARTICULAR; INTRALESIONAL; INTRAMUSCULAR; INTRAVENOUS; SOFT TISSUE
Status: COMPLETED | OUTPATIENT
Start: 2017-03-25 | End: 2017-03-25

## 2017-03-25 RX ORDER — IPRATROPIUM BROMIDE AND ALBUTEROL SULFATE 2.5; .5 MG/3ML; MG/3ML
3 SOLUTION RESPIRATORY (INHALATION) EVERY 6 HOURS PRN
Qty: 30 VIAL | Refills: 0 | Status: SHIPPED | OUTPATIENT
Start: 2017-03-25 | End: 2017-06-14 | Stop reason: SDUPTHER

## 2017-03-25 RX ORDER — AMOXICILLIN AND CLAVULANATE POTASSIUM 875; 125 MG/1; MG/1
1 TABLET, FILM COATED ORAL 2 TIMES DAILY
Qty: 20 TABLET | Refills: 0 | Status: SHIPPED | OUTPATIENT
Start: 2017-03-25 | End: 2017-04-04

## 2017-03-25 RX ADMIN — DEXAMETHASONE SODIUM PHOSPHATE 4 MG: 4 INJECTION, SOLUTION INTRA-ARTICULAR; INTRALESIONAL; INTRAMUSCULAR; INTRAVENOUS; SOFT TISSUE at 09:03

## 2017-03-25 NOTE — PROGRESS NOTES
"[facility]  Progress Note    Subjective:       Patient ID: Micheal Hunt is a 76 y.o. male.    Chief Complaint: Sinusitis (for 10 days)    HPI76 y.o.male is here today complaining of fatigue, sinus congestion, postnasal drip, rhinorrhea, sneezing, and cough.  Patient has had symptoms for the past 10 days.  Patient has been given steroids and taking over-the-counter medications which have not provided adequate symptom relief.  Patient denies fever, chills, or sick contacts.  No further complaints at today's visit.  Review of Systems   Constitutional: Positive for fatigue. Negative for chills and fever.   HENT: Positive for congestion, postnasal drip, rhinorrhea, sinus pressure and sneezing. Negative for ear pain.    Eyes: Negative for pain.   Respiratory: Positive for cough. Negative for shortness of breath and wheezing.    Cardiovascular: Negative for chest pain, palpitations and leg swelling.   Gastrointestinal: Negative for abdominal distention, abdominal pain, constipation, diarrhea, nausea and vomiting.   Genitourinary: Negative for difficulty urinating.   Musculoskeletal: Negative for back pain and myalgias.   Skin: Negative for rash.   Neurological: Negative for dizziness, seizures, syncope, weakness and numbness.   Psychiatric/Behavioral: Negative for sleep disturbance. The patient is not nervous/anxious.        Objective:      Vitals:    03/25/17 0928   BP: 131/75   BP Location: Right arm   Patient Position: Sitting   BP Method: Automatic   Pulse: 72   Resp: 20   Temp: 98.2 °F (36.8 °C)   TempSrc: Oral   Weight: 115 kg (253 lb 8.5 oz)   Height: 6' 1" (1.854 m)     Body mass index is 33.45 kg/(m^2).  Physical Exam   Constitutional: He is oriented to person, place, and time. He appears well-developed and well-nourished.   HENT:   Head: Normocephalic and atraumatic.   Maxillary sinus tender to palpation  Rhinorrhea present   Eyes: Conjunctivae and EOM are normal. Pupils are equal, round, and " reactive to light.   Neck: Normal range of motion. Neck supple. No JVD present.   Cardiovascular: Normal rate, regular rhythm, normal heart sounds and intact distal pulses.  Exam reveals no gallop and no friction rub.    No murmur heard.  Pulmonary/Chest: Effort normal and breath sounds normal. No respiratory distress. He has no wheezes.   Abdominal: Soft. Bowel sounds are normal. There is no tenderness.   Musculoskeletal: Normal range of motion.   Neurological: He is alert and oriented to person, place, and time. No cranial nerve deficit.   Skin: Skin is warm and dry.   Psychiatric: He has a normal mood and affect. His behavior is normal. Judgment and thought content normal.   Nursing note and vitals reviewed.      Assessment:       1. Sinusitis, unspecified chronicity, unspecified location        Plan:       Sinusitis, unspecified chronicity, unspecified location  -     dexamethasone injection 4 mg; Inject 1 mL (4 mg total) into the muscle one time.  -     amoxicillin-clavulanate 875-125mg (AUGMENTIN) 875-125 mg per tablet; Take 1 tablet by mouth 2 (two) times daily.  Dispense: 20 tablet; Refill: 0  -     albuterol-ipratropium 2.5mg-0.5mg/3mL (DUO-NEB) 0.5 mg-3 mg(2.5 mg base)/3 mL nebulizer solution; Take 3 mLs by nebulization every 6 (six) hours as needed for Wheezing. Rescue  Dispense: 30 vial; Refill: 0  - mucinex cold and flu  - flonase  - vicks       Return if symptoms worsen or fail to improve.  Morris Ramírez MD  [facility]  3/25/2017 9:44 AM

## 2017-03-25 NOTE — PROGRESS NOTES
Dexamethasone 4mg given L outer Glut IM.  Tolerated well.  Waited 15 min.  No adverse affect noted

## 2017-03-28 ENCOUNTER — HOSPITAL ENCOUNTER (OUTPATIENT)
Dept: RADIOLOGY | Facility: HOSPITAL | Age: 77
Discharge: HOME | End: 2017-03-28
Attending: OTOLARYNGOLOGY
Payer: MEDICARE

## 2017-03-28 DIAGNOSIS — J32.4 CHRONIC PANSINUSITIS: ICD-10-CM

## 2017-03-28 PROCEDURE — 70486 CT MAXILLOFACIAL W/O DYE: CPT | Mod: TC

## 2017-03-28 PROCEDURE — 70486 CT MAXILLOFACIAL W/O DYE: CPT | Mod: 26,,, | Performed by: RADIOLOGY

## 2017-04-06 DIAGNOSIS — E78.5 HYPERLIPIDEMIA, UNSPECIFIED HYPERLIPIDEMIA TYPE: ICD-10-CM

## 2017-04-08 RX ORDER — ATORVASTATIN CALCIUM 80 MG/1
80 TABLET, FILM COATED ORAL DAILY
Qty: 90 TABLET | Refills: 3 | Status: SHIPPED | OUTPATIENT
Start: 2017-04-08 | End: 2018-04-11 | Stop reason: SDUPTHER

## 2017-04-10 ENCOUNTER — TELEPHONE (OUTPATIENT)
Dept: GASTROENTEROLOGY | Facility: CLINIC | Age: 77
End: 2017-04-10

## 2017-04-10 ENCOUNTER — PATIENT MESSAGE (OUTPATIENT)
Dept: FAMILY MEDICINE | Facility: CLINIC | Age: 77
End: 2017-04-10

## 2017-04-10 DIAGNOSIS — K31.9 GASTRIC LESION: Primary | ICD-10-CM

## 2017-04-11 ENCOUNTER — DOCUMENTATION ONLY (OUTPATIENT)
Dept: FAMILY MEDICINE | Facility: CLINIC | Age: 77
End: 2017-04-11

## 2017-04-11 NOTE — PROGRESS NOTES
Pre-Visit Chart Review  For Appointment Scheduled on 4/17/17    Health Maintenance Due   Topic Date Due    TETANUS VACCINE  09/27/1957    Zoster Vaccine  09/27/1999    Pneumococcal (65+) (2 of 2 - PCV13) 01/12/2017

## 2017-04-13 ENCOUNTER — OFFICE VISIT (OUTPATIENT)
Dept: PHYSICAL MEDICINE AND REHAB | Facility: CLINIC | Age: 77
End: 2017-04-13
Payer: MEDICARE

## 2017-04-13 VITALS
RESPIRATION RATE: 18 BRPM | DIASTOLIC BLOOD PRESSURE: 70 MMHG | BODY MASS INDEX: 33.29 KG/M2 | SYSTOLIC BLOOD PRESSURE: 121 MMHG | TEMPERATURE: 98 F | HEIGHT: 73 IN | HEART RATE: 72 BPM | WEIGHT: 251.19 LBS

## 2017-04-13 DIAGNOSIS — M17.0 PRIMARY OSTEOARTHRITIS OF BOTH KNEES: Primary | ICD-10-CM

## 2017-04-13 DIAGNOSIS — G57.82 SAPHENOUS NEURALGIA, LEFT: ICD-10-CM

## 2017-04-13 PROCEDURE — 64450 NJX AA&/STRD OTHER PN/BRANCH: CPT | Mod: S$PBB,,, | Performed by: PHYSICAL MEDICINE & REHABILITATION

## 2017-04-13 PROCEDURE — 99499 UNLISTED E&M SERVICE: CPT | Mod: S$PBB,,, | Performed by: PHYSICAL MEDICINE & REHABILITATION

## 2017-04-13 PROCEDURE — 76942 ECHO GUIDE FOR BIOPSY: CPT | Mod: PBBFAC,PN | Performed by: PHYSICAL MEDICINE & REHABILITATION

## 2017-04-13 PROCEDURE — 76942 ECHO GUIDE FOR BIOPSY: CPT | Mod: 26,S$PBB,, | Performed by: PHYSICAL MEDICINE & REHABILITATION

## 2017-04-13 PROCEDURE — 99999 PR PBB SHADOW E&M-EST. PATIENT-LVL V: CPT | Mod: PBBFAC,,, | Performed by: PHYSICAL MEDICINE & REHABILITATION

## 2017-04-13 PROCEDURE — 99215 OFFICE O/P EST HI 40 MIN: CPT | Mod: PBBFAC,PN | Performed by: PHYSICAL MEDICINE & REHABILITATION

## 2017-04-13 PROCEDURE — 64450 NJX AA&/STRD OTHER PN/BRANCH: CPT | Mod: PBBFAC,PN | Performed by: PHYSICAL MEDICINE & REHABILITATION

## 2017-04-13 RX ORDER — PREDNISONE 10 MG/1
10 TABLET ORAL 2 TIMES DAILY
Refills: 0 | COMMUNITY
Start: 2017-03-28 | End: 2017-05-27 | Stop reason: ALTCHOICE

## 2017-04-13 RX ORDER — LIDOCAINE HYDROCHLORIDE 10 MG/ML
10 INJECTION INFILTRATION; PERINEURAL ONCE
Status: COMPLETED | OUTPATIENT
Start: 2017-04-13 | End: 2017-04-13

## 2017-04-13 RX ORDER — CEFUROXIME AXETIL 250 MG/1
250 TABLET ORAL 2 TIMES DAILY
Refills: 0 | COMMUNITY
Start: 2017-03-28 | End: 2017-04-26

## 2017-04-13 RX ADMIN — LIDOCAINE HYDROCHLORIDE 10 ML: 10 INJECTION INFILTRATION; PERINEURAL at 02:04

## 2017-04-13 NOTE — PROGRESS NOTES
pROCEDURE NOTE: risk and benefit of left sAPHENOUS NERVE BLOCK AND HYDRODISEECTION injection reviewed with. patient. Verbal consent was obtained. Injection was performed after sterile prep w. Betadine. MEDIAL approach used. ALONG  EDGE OF VASTUS MEDIALIS MUSCLE. 25g 2 inch needle used hydrodissecting along vastus medialis facial plane   Ultrasound guidance was utilized for needle visualization. A TOTAL OF 10ML OF LIDOCAINE 1% AND 10ML OF STERILE NORMAL SALINE WAS UTILIZED. No complications. iMMEDIATE IMPROVEMENT OF KNEE PAIN POST PROCEDURE     Ultrasound interpretation was performed prior to the procedure to identify the target and any adjacent neurovascular structures.  Subsequently, interpretation was performed during real- time needle guidance confirming placement. Post- intervention interpretation was also performed confirming appropriate injectate flow and hemostasis.  Images indicating needle placement have been saved in [employer].

## 2017-04-13 NOTE — MR AVS SNAPSHOT
[facility]  [address] [place] [address] Drive  [place] [address]  Phone: [phone]  Fax: [phone]                  Micheal Hunt   2017 10:20 AM   Office Visit    Description:  Male : 1940   Provider:  Yanira Whitehead DO   Department:  [facility]Re[facility]           Reason for Visit     sp nerve block           Diagnoses this Visit        Comments    Primary osteoarthritis of both knees    -  Primary            To Do List           Future Appointments        Provider Department Dept Phone    2017 9:00 AM [facility] [phone]    2017 12:00 PM Yanira Whitehead DO [facility] Med/Re[facility] [phone]    2017 9:00 AM Yanira Whitehead DO [facility] [phone]    2017 1:00 PM Getachew Dunlap MD [facility] - Cardiology [phone]    2017 8:40 AM kP Lakhain MD [place] - Family Medicine [phone]      Goals (5 Years of Data)     None      [facility]     [facility] -  Assistance  Unless otherwise directed by your provider, please contact [facility], our nurse care line that is available for  assistance.     Registered nurses in the [facility] provide: appointment scheduling, clinical advisement, health education, and other advisory services.  Call: [phone] (toll free)               Medications           Message regarding Medications     Verify the changes and/or additions to your medication regime listed below are the same as discussed with your clinician today.  If any of these changes or additions are incorrect, please notify your healthcare provider.        These medications were administered today        Dose Freq    lidocaine HCL 10 mg/ml (1%) injection 10 mL 10 mL Once    Sig: 10 mLs by Other route once.    Class: Normal    Route: Other           Verify that the below list of medications is an  accurate representation of the medications you are currently taking.  If none reported, the list may be blank. If incorrect, please contact your healthcare provider. Carry this list with you in case of emergency.           Current Medications     albuterol 90 mcg/actuation inhaler Inhale 2 puffs into the lungs every 6 (six) hours as needed for Wheezing.    albuterol-ipratropium 2.5mg-0.5mg/3mL (DUO-NEB) 0.5 mg-3 mg(2.5 mg base)/3 mL nebulizer solution Take 3 mLs by nebulization every 6 (six) hours as needed for Wheezing. Rescue    allopurinol (ZYLOPRIM) 300 MG tablet Take 1 tablet (300 mg total) by mouth once daily.    amlodipine (NORVASC) 10 MG tablet Take 1 tablet (10 mg total) by mouth before dinner.    aspirin (ECOTRIN) 81 MG EC tablet Take 81 mg by mouth once daily.      atorvastatin (LIPITOR) 80 MG tablet Take 1 tablet (80 mg total) by mouth once daily.    azelastine (ASTELIN) 137 mcg (0.1 %) nasal spray USE 2 SPRAYS TWICE DAILY IN EACH NOSTRIL    benzonatate (TESSALON) 200 MG capsule Take 1 capsule (200 mg total) by mouth 3 (three) times daily as needed for Cough.    calcium-vitamin D 500-125 mg-unit tablet Take 1 tablet by mouth as needed.     carvedilol (COREG) 6.25 MG tablet Take 1 tablet (6.25 mg total) by mouth 2 (two) times daily with meals.    cefUROXime (CEFTIN) 250 MG tablet Take 250 mg by mouth 2 (two) times daily.    colchicine 0.6 mg tablet Take 1 tablet (0.6 mg total) by mouth 2 (two) times daily.    cyanocobalamin, vitamin B-12, (VITAMIN B-12) 1,000 mcg Subl Take 1 tablet by mouth daily as needed. Takes 3,000mcg    fexofenadine (ALLEGRA) 180 MG tablet Take 1 tablet (180 mg total) by mouth once daily.    finasteride (PROSCAR) 5 mg tablet Take 1 tablet (5 mg total) by mouth once daily.    fluticasone (FLONASE) 50 mcg/actuation nasal spray 1 spray by Each Nare route once daily.    gabapentin (NEURONTIN) 100 MG capsule 1 by mouth in the morning 1 by mouth in the afternoon and 3 by mouth at bedtime  "   inhalation device (AEROCHAMBER PLUS FLOW-VU) Use as directed for inhalation.    meclizine (ANTIVERT) 25 mg tablet Take 1 tablet by mouth daily as needed.    nitroGLYCERIN (NITROSTAT) 0.4 MG SL tablet Place 1 tablet (0.4 mg total) under the tongue every 5 (five) minutes as needed for Chest pain.    predniSONE (DELTASONE) 10 MG tablet Take 10 mg by mouth 2 (two) times daily.    terazosin (HYTRIN) 10 MG capsule Take 1 capsule (10 mg total) by mouth every evening.    tramadol (ULTRAM) 50 mg tablet Take 1 tablet (50 mg total) by mouth every 6 (six) hours as needed.    urea (CARMOL) 40 % Crea Apply topically once daily.    valsartan (DIOVAN) 320 MG tablet Take 1 tablet (320 mg total) by mouth once daily.           Clinical Reference Information           Your Vitals Were     BP Pulse Temp Resp Height Weight    121/70 72 97.9 °F (36.6 °C) (Oral) 18 6' 1" (1.854 m) 114 kg (251 lb 3.4 oz)    BMI                33.14 kg/m2          Blood Pressure          Most Recent Value    BP  121/70      Allergies as of 4/13/2017     Eplerenone      Immunizations Administered on Date of Encounter - 4/13/2017     None      Orders Placed During Today's Visit      Normal Orders This Visit    SM  Guidance For Needle Placement       Language Assistance Services     ATTENTION: Language assistance services are available, free of charge. Please call [phone].      ATENCIÓN: Si habla español, tiene a nolan disposición servicios gratuitos de asistencia lingüística. Llcharli al [phone].     [facility] Ý: N?u b?n nói Ti?ng Vi?t, có các d?ch v? h? tr? ngôn ng? mi?n phí dành cho b?n. G?i s? [phone].         [facility] complies with applicable Federal civil rights laws and does not discriminate on the basis of race, color, national origin, age, disability, or sex.        "

## 2017-04-20 ENCOUNTER — LAB VISIT (OUTPATIENT)
Dept: LAB | Facility: HOSPITAL | Age: 77
End: 2017-04-20
Attending: ALLERGY & IMMUNOLOGY
Payer: MEDICARE

## 2017-04-20 ENCOUNTER — OFFICE VISIT (OUTPATIENT)
Dept: ALLERGY | Facility: CLINIC | Age: 77
End: 2017-04-20
Payer: MEDICARE

## 2017-04-20 VITALS
HEIGHT: 73 IN | BODY MASS INDEX: 32.84 KG/M2 | DIASTOLIC BLOOD PRESSURE: 70 MMHG | HEART RATE: 64 BPM | OXYGEN SATURATION: 98 % | SYSTOLIC BLOOD PRESSURE: 120 MMHG | WEIGHT: 247.81 LBS

## 2017-04-20 DIAGNOSIS — J31.0 CHRONIC RHINITIS: ICD-10-CM

## 2017-04-20 DIAGNOSIS — J32.9 RECURRENT SINUS INFECTIONS: ICD-10-CM

## 2017-04-20 DIAGNOSIS — R42 DIZZINESS: ICD-10-CM

## 2017-04-20 DIAGNOSIS — J31.0 CHRONIC RHINITIS: Primary | ICD-10-CM

## 2017-04-20 LAB
BASOPHILS # BLD AUTO: 0.01 K/UL
BASOPHILS NFR BLD: 0.2 %
DIFFERENTIAL METHOD: ABNORMAL
EOSINOPHIL # BLD AUTO: 0.2 K/UL
EOSINOPHIL NFR BLD: 5 %
ERYTHROCYTE [DISTWIDTH] IN BLOOD BY AUTOMATED COUNT: 14 %
HCT VFR BLD AUTO: 37.8 %
HGB BLD-MCNC: 12.8 G/DL
IGA SERPL-MCNC: 256 MG/DL
IGE SERPL-ACNC: <35 IU/ML
IGG SERPL-MCNC: 1060 MG/DL
IGM SERPL-MCNC: 36 MG/DL
LYMPHOCYTES # BLD AUTO: 1.7 K/UL
LYMPHOCYTES NFR BLD: 37.6 %
MCH RBC QN AUTO: 30.8 PG
MCHC RBC AUTO-ENTMCNC: 33.9 %
MCV RBC AUTO: 91 FL
MONOCYTES # BLD AUTO: 0.4 K/UL
MONOCYTES NFR BLD: 8 %
NEUTROPHILS # BLD AUTO: 2.3 K/UL
NEUTROPHILS NFR BLD: 49.2 %
PLATELET # BLD AUTO: 167 K/UL
PMV BLD AUTO: 9.9 FL
RBC # BLD AUTO: 4.16 M/UL
WBC # BLD AUTO: 4.63 K/UL

## 2017-04-20 PROCEDURE — 86355 B CELLS TOTAL COUNT: CPT

## 2017-04-20 PROCEDURE — 82784 ASSAY IGA/IGD/IGG/IGM EACH: CPT | Mod: 59

## 2017-04-20 PROCEDURE — 86003 ALLG SPEC IGE CRUDE XTRC EA: CPT

## 2017-04-20 PROCEDURE — 86003 ALLG SPEC IGE CRUDE XTRC EA: CPT | Mod: 59

## 2017-04-20 PROCEDURE — 99999 PR PBB SHADOW E&M-EST. PATIENT-LVL III: CPT | Mod: PBBFAC,,, | Performed by: ALLERGY & IMMUNOLOGY

## 2017-04-20 PROCEDURE — 99204 OFFICE O/P NEW MOD 45 MIN: CPT | Mod: S$PBB,,, | Performed by: ALLERGY & IMMUNOLOGY

## 2017-04-20 PROCEDURE — 85025 COMPLETE CBC W/AUTO DIFF WBC: CPT

## 2017-04-20 PROCEDURE — 82784 ASSAY IGA/IGD/IGG/IGM EACH: CPT

## 2017-04-20 PROCEDURE — 86357 NK CELLS TOTAL COUNT: CPT

## 2017-04-20 PROCEDURE — 82787 IGG 1 2 3 OR 4 EACH: CPT | Mod: 59

## 2017-04-20 PROCEDURE — 82785 ASSAY OF IGE: CPT

## 2017-04-20 PROCEDURE — 36415 COLL VENOUS BLD VENIPUNCTURE: CPT | Mod: PO

## 2017-04-20 PROCEDURE — 86648 DIPHTHERIA ANTIBODY: CPT

## 2017-04-20 PROCEDURE — 86360 T CELL ABSOLUTE COUNT/RATIO: CPT

## 2017-04-20 PROCEDURE — 86359 T CELLS TOTAL COUNT: CPT

## 2017-04-20 NOTE — PROGRESS NOTES
Subjective:       Patient ID: Micheal Hunt is a 76 y.o. male.    Chief Complaint:  Allergies (gets quite often, today and yesterday are actually two best days in several weeks. )      HPI Comments: 75 yo man presents for new patient evaluation of chronic sinus issues. He states he feels like he has sinus infections every couple months. With sinus issues he has dizziness. He sees Dr Nails and has had CT scan with sinusitis. He has been on antibiotics and did help clear some. He has constant congestion, runny nose, itchy eyes sore throat, sneeze, PND. He wakes at night because of congestion and uses saline spray. He has all year but spring is bad. Colder weather is better time for him. No difference inside or out. No difference any time of day. No triggers. He has on Flonase 2 SEN daily. azelastine daily and was on allegra but off now. He takes sudafed and helps the most. He has no asthma or eczema. No known food, insect or latex allergy. Never had sinus surgery.       Environmental History: see history section for home environment  Review of Systems   Constitutional: Negative for activity change, appetite change, chills, fatigue, fever and unexpected weight change.   HENT: Positive for congestion, postnasal drip, rhinorrhea, sinus pressure, sneezing and sore throat. Negative for ear discharge, ear pain, facial swelling, hearing loss, mouth sores, nosebleeds, tinnitus, trouble swallowing and voice change.    Eyes: Positive for discharge. Negative for redness, itching and visual disturbance.   Respiratory: Positive for cough. Negative for chest tightness, shortness of breath and wheezing.    Cardiovascular: Negative for chest pain, palpitations and leg swelling.   Gastrointestinal: Negative for abdominal distention, abdominal pain, constipation, diarrhea, nausea and vomiting.   Genitourinary: Negative for difficulty urinating.   Musculoskeletal: Positive for arthralgias. Negative for back pain, joint swelling  and myalgias.   Skin: Negative for color change, pallor and rash.   Neurological: Positive for dizziness and headaches. Negative for tremors, speech difficulty, weakness and light-headedness.   Hematological: Negative for adenopathy. Does not bruise/bleed easily.   Psychiatric/Behavioral: Negative for agitation, confusion, decreased concentration and sleep disturbance. The patient is not nervous/anxious.         Objective:    Physical Exam   Constitutional: He is oriented to person, place, and time. He appears well-developed and well-nourished. No distress.   HENT:   Head: Normocephalic and atraumatic.   Right Ear: Hearing, tympanic membrane, external ear and ear canal normal.   Left Ear: Hearing, tympanic membrane, external ear and ear canal normal.   Nose: No mucosal edema (pink turbinates), rhinorrhea, sinus tenderness or septal deviation. No epistaxis.   Mouth/Throat: Oropharynx is clear and moist and mucous membranes are normal. No uvula swelling.   Eyes: Conjunctivae are normal. Right eye exhibits no discharge. Left eye exhibits no discharge.   Neck: Normal range of motion. No thyromegaly present.   Cardiovascular: Normal rate, regular rhythm and normal heart sounds.    No murmur heard.  Pulmonary/Chest: Effort normal and breath sounds normal. No respiratory distress. He has no wheezes.   Abdominal: Soft. He exhibits no distension. There is no tenderness.   Musculoskeletal: Normal range of motion. He exhibits no edema.   Lymphadenopathy:     He has no cervical adenopathy.   Neurological: He is alert and oriented to person, place, and time. Coordination normal.   Skin: Skin is warm and dry. No rash noted. No erythema.   Psychiatric: He has a normal mood and affect. His behavior is normal. Judgment and thought content normal.   Nursing note and vitals reviewed.      Laboratory:   none performed   Assessment:       1. Chronic rhinitis    2. Recurrent sinus infections    3. Dizziness         Plan:       1. Labs  for immune systema and immunocaps  2. continue fluticasone 2 SEN daily and resume azelastine 2 SEN BID  3. Phone review

## 2017-04-21 LAB
CD3+CD4+ CELLS # BLD: 703 CELLS/UL (ref 300–1400)
CD3+CD4+ CELLS NFR BLD: 38.8 % (ref 28–57)
LYMPHOCYTES NFR CSF MANUAL: 0.86 % (ref 0.9–3.6)
LYMPHOCYTES NFR CSF MANUAL: 129 CELLS/UL (ref 100–500)
LYMPHOCYTES NFR CSF MANUAL: 1535 CELLS/UL (ref 700–2100)
LYMPHOCYTES NFR CSF MANUAL: 172 CELLS/UL (ref 90–600)
LYMPHOCYTES NFR CSF MANUAL: 45.2 % (ref 10–39)
LYMPHOCYTES NFR CSF MANUAL: 6.8 % (ref 6–19)
LYMPHOCYTES NFR CSF MANUAL: 818 CELLS/UL (ref 200–900)
LYMPHOCYTES NFR CSF MANUAL: 84.7 % (ref 55–83)
LYMPHOCYTES NFR CSF MANUAL: 9.1 % (ref 7–31)

## 2017-04-22 LAB
IGG1 SER-MCNC: 751 MG/DL
IGG2 SER-MCNC: 212 MG/DL
IGG3 SER-MCNC: 27 MG/DL
IGG4 SER-MCNC: 21 MG/DL

## 2017-04-24 ENCOUNTER — PATIENT MESSAGE (OUTPATIENT)
Dept: FAMILY MEDICINE | Facility: CLINIC | Age: 77
End: 2017-04-24

## 2017-04-24 ENCOUNTER — TELEPHONE (OUTPATIENT)
Dept: GASTROENTEROLOGY | Facility: CLINIC | Age: 77
End: 2017-04-24

## 2017-04-24 ENCOUNTER — PATIENT MESSAGE (OUTPATIENT)
Dept: CARDIOLOGY | Facility: CLINIC | Age: 77
End: 2017-04-24

## 2017-04-24 DIAGNOSIS — M1A.3720 CHRONIC GOUT OF LEFT ANKLE DUE TO RENAL IMPAIRMENT WITHOUT TOPHUS: ICD-10-CM

## 2017-04-24 DIAGNOSIS — M10.9 GOUT, ARTHRITIS: ICD-10-CM

## 2017-04-24 DIAGNOSIS — R05.8 COUGH DUE TO ACE INHIBITOR: ICD-10-CM

## 2017-04-24 DIAGNOSIS — I10 ESSENTIAL HYPERTENSION: ICD-10-CM

## 2017-04-24 DIAGNOSIS — I51.9 LV DYSFUNCTION: ICD-10-CM

## 2017-04-24 DIAGNOSIS — N40.0 BENIGN NON-NODULAR PROSTATIC HYPERPLASIA WITHOUT LOWER URINARY TRACT SYMPTOMS: ICD-10-CM

## 2017-04-24 DIAGNOSIS — T46.4X5A COUGH DUE TO ACE INHIBITOR: ICD-10-CM

## 2017-04-24 LAB
A ALTERNATA IGE QN: <0.35 KU/L
A FUMIGATUS IGE QN: <0.35 KU/L
ALLERGEN MAPLE/SYCAMORE IGE: <0.35 KU/L
ALLERGEN PENICILLIUM IGE: <0.35 KU/L
ALLERGEN WALNUT TREE IGE: <0.35 KU/L
ALLERGEN WHITE PINE TREE IGE: <0.35 KU/L
ALLERGEN WILLOW IGE: <0.35 KU/L
BAHIA GRASS IGE QN: <0.35 KU/L
BALD CYPRESS IGE QN: <0.35 KU/L
BERMUDA GRASS IGE QN: <0.35 KU/L
C GLOBOSUM IGE QN: <0.35 KU/L
C HERBARUM IGE QN: <0.35 KU/L
C LUNATA IGE QN: <0.35 KU/L
CAT DANDER IGE QN: <0.35 KU/L
COMMON RAGWEED IGE QN: <0.35 KU/L
COTTONWOOD IGE QN: <0.35 KU/L
COW DANDER IGE QN: <0.35 KU/L
D FARINAE IGE QN: <0.35 KU/L
D PTERONYSS IGE QN: <0.35 KU/L
DEPRECATED A ALTERNATA IGE RAST QL: NORMAL
DEPRECATED A FUMIGATUS IGE RAST QL: NORMAL
DEPRECATED BAHIA GRASS IGE RAST QL: NORMAL
DEPRECATED BALD CYPRESS IGE RAST QL: NORMAL
DEPRECATED BERMUDA GRASS IGE RAST QL: NORMAL
DEPRECATED C GLOBOSUM IGE RAST QL: NORMAL
DEPRECATED C HERBARUM IGE RAST QL: NORMAL
DEPRECATED C LUNATA IGE RAST QL: NORMAL
DEPRECATED CAT DANDER IGE RAST QL: NORMAL
DEPRECATED COMMON RAGWEED IGE RAST QL: NORMAL
DEPRECATED COTTONWOOD IGE RAST QL: NORMAL
DEPRECATED COW DANDER IGE RAST QL: NORMAL
DEPRECATED D FARINAE IGE RAST QL: NORMAL
DEPRECATED D PTERONYSS IGE RAST QL: NORMAL
DEPRECATED DOG DANDER IGE RAST QL: NORMAL
DEPRECATED ENGL PLANTAIN IGE RAST QL: NORMAL
DEPRECATED HORSE DANDER IGE RAST QL: NORMAL
DEPRECATED JOHNSON GRASS IGE RAST QL: NORMAL
DEPRECATED MARSH ELDER IGE RAST QL: NORMAL
DEPRECATED MUGWORT IGE RAST QL: NORMAL
DEPRECATED PECAN/HICK TREE IGE RAST QL: NORMAL
DEPRECATED RABBIT EPITH IGE RAST QL: NORMAL
DEPRECATED ROACH IGE RAST QL: NORMAL
DEPRECATED S ROSTRATA IGE RAST QL: NORMAL
DEPRECATED SALTWORT IGE RAST QL: NORMAL
DEPRECATED SILVER BIRCH IGE RAST QL: NORMAL
DEPRECATED TIMOTHY IGE RAST QL: ABNORMAL
DEPRECATED WHITE OAK IGE RAST QL: NORMAL
DOG DANDER IGE QN: <0.35 KU/L
ENGL PLANTAIN IGE QN: <0.35 KU/L
HORSE DANDER IGE QN: <0.35 KU/L
JOHNSON GRASS IGE QN: <0.35 KU/L
MAPLE/SYCAMORE CLASS: NORMAL
MARSH ELDER IGE QN: <0.35 KU/L
MUGWORT IGE QN: <0.35 KU/L
PECAN/HICK TREE IGE QN: <0.35 KU/L
PENICILLIUM CLASS: NORMAL
RABBIT EPITH IGE QN: <0.35 KU/L
RAGWEED, WESTERN IGE: <0.35 KU/L
RAGWEED, WESTERN, CLASS: NORMAL
ROACH IGE QN: <0.35 KU/L
S ROSTRATA IGE QN: <0.35 KU/L
SALTWORT IGE QN: <0.35 KU/L
SILVER BIRCH IGE QN: <0.35 KU/L
TIMOTHY IGE QN: 0.54 KU/L
WALNUT TREE CLASS: NORMAL
WHITE OAK IGE QN: <0.35 KU/L
WHITE PINE CLASS: NORMAL
WILLOW CLASS: NORMAL

## 2017-04-24 RX ORDER — FINASTERIDE 5 MG/1
5 TABLET, FILM COATED ORAL DAILY
Qty: 90 TABLET | Refills: 3 | Status: SHIPPED | OUTPATIENT
Start: 2017-04-24 | End: 2017-05-10

## 2017-04-24 RX ORDER — VALSARTAN 320 MG/1
320 TABLET ORAL DAILY
Qty: 90 TABLET | Refills: 3 | Status: SHIPPED | OUTPATIENT
Start: 2017-04-24 | End: 2017-09-05

## 2017-04-24 RX ORDER — TRAMADOL HYDROCHLORIDE 50 MG/1
50 TABLET ORAL EVERY 6 HOURS PRN
Qty: 120 TABLET | Refills: 4 | Status: CANCELLED | OUTPATIENT
Start: 2017-04-24

## 2017-04-24 RX ORDER — CARVEDILOL 6.25 MG/1
6.25 TABLET ORAL 2 TIMES DAILY WITH MEALS
Qty: 90 TABLET | Refills: 3
Start: 2017-04-24 | End: 2017-05-19 | Stop reason: SDUPTHER

## 2017-04-25 ENCOUNTER — TELEPHONE (OUTPATIENT)
Dept: ALLERGY | Facility: CLINIC | Age: 77
End: 2017-04-25

## 2017-04-25 ENCOUNTER — TELEPHONE (OUTPATIENT)
Dept: CARDIOLOGY | Facility: CLINIC | Age: 77
End: 2017-04-25

## 2017-04-25 ENCOUNTER — PATIENT MESSAGE (OUTPATIENT)
Dept: FAMILY MEDICINE | Facility: CLINIC | Age: 77
End: 2017-04-25

## 2017-04-25 RX ORDER — ALLOPURINOL 300 MG/1
300 TABLET ORAL DAILY
Qty: 90 TABLET | Refills: 3 | Status: SHIPPED | OUTPATIENT
Start: 2017-04-25 | End: 2018-06-25 | Stop reason: DRUGHIGH

## 2017-04-25 RX ORDER — COLCHICINE 0.6 MG/1
0.6 TABLET ORAL 2 TIMES DAILY
Qty: 10 TABLET | Refills: 3 | Status: SHIPPED | OUTPATIENT
Start: 2017-04-25 | End: 2017-09-05

## 2017-04-25 NOTE — TELEPHONE ENCOUNTER
Returned pts call and advised that no one from this office placed a call to him. Pt advised that he did not ask for Dr Dunlap's office when he called, and does not know why it was sent to this office. Pt does not know who called him and will wait for another call. No further issues.

## 2017-04-25 NOTE — TELEPHONE ENCOUNTER
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 He needs to continue Flonase 2 SEN daily and make sure using azelastine 2 SEN BID. If not controlled may add singulair  Immune system tests are good except not adequately protected against strep pneumoniae.  Recommend pneumovax and repeat labs.

## 2017-04-25 NOTE — TELEPHONE ENCOUNTER
----- Message from Magali Rocha sent at 4/25/2017 11:25 AM CDT -----  Patient is returning office call from yesterday. Please call back with details at [phone].

## 2017-04-26 ENCOUNTER — LAB VISIT (OUTPATIENT)
Dept: LAB | Facility: HOSPITAL | Age: 77
End: 2017-04-26
Attending: FAMILY MEDICINE
Payer: MEDICARE

## 2017-04-26 ENCOUNTER — TELEPHONE (OUTPATIENT)
Dept: NEUROLOGY | Facility: CLINIC | Age: 77
End: 2017-04-26

## 2017-04-26 ENCOUNTER — LAB VISIT (OUTPATIENT)
Dept: LAB | Facility: HOSPITAL | Age: 77
End: 2017-04-26
Attending: INTERNAL MEDICINE
Payer: MEDICARE

## 2017-04-26 ENCOUNTER — DOCUMENTATION ONLY (OUTPATIENT)
Dept: FAMILY MEDICINE | Facility: CLINIC | Age: 77
End: 2017-04-26

## 2017-04-26 ENCOUNTER — OFFICE VISIT (OUTPATIENT)
Dept: FAMILY MEDICINE | Facility: CLINIC | Age: 77
End: 2017-04-26
Payer: MEDICARE

## 2017-04-26 VITALS
HEART RATE: 73 BPM | HEIGHT: 73 IN | SYSTOLIC BLOOD PRESSURE: 137 MMHG | DIASTOLIC BLOOD PRESSURE: 87 MMHG | TEMPERATURE: 98 F | WEIGHT: 250.44 LBS | OXYGEN SATURATION: 97 % | RESPIRATION RATE: 16 BRPM | BODY MASS INDEX: 33.19 KG/M2

## 2017-04-26 DIAGNOSIS — R42 DIZZINESS: ICD-10-CM

## 2017-04-26 DIAGNOSIS — I11.9 LVH (LEFT VENTRICULAR HYPERTROPHY) DUE TO HYPERTENSIVE DISEASE, WITHOUT HEART FAILURE: ICD-10-CM

## 2017-04-26 DIAGNOSIS — N18.30 CKD (CHRONIC KIDNEY DISEASE), STAGE III: ICD-10-CM

## 2017-04-26 DIAGNOSIS — M10.9 GOUT, ARTHRITIS: ICD-10-CM

## 2017-04-26 DIAGNOSIS — I10 ESSENTIAL HYPERTENSION: Primary | ICD-10-CM

## 2017-04-26 DIAGNOSIS — I35.1 AORTIC VALVE REGURGITATION, UNSPECIFIED ETIOLOGY: ICD-10-CM

## 2017-04-26 DIAGNOSIS — D64.9 ANEMIA, UNSPECIFIED TYPE: ICD-10-CM

## 2017-04-26 DIAGNOSIS — N18.30 CHRONIC KIDNEY DISEASE, STAGE III (MODERATE): Primary | ICD-10-CM

## 2017-04-26 DIAGNOSIS — I10 ESSENTIAL HYPERTENSION: ICD-10-CM

## 2017-04-26 LAB
ALBUMIN SERPL BCP-MCNC: 3.5 G/DL
ALBUMIN SERPL BCP-MCNC: 3.7 G/DL
ALP SERPL-CCNC: 87 U/L
ALP SERPL-CCNC: 98 U/L
ALT SERPL W/O P-5'-P-CCNC: 16 U/L
ALT SERPL W/O P-5'-P-CCNC: 19 U/L
ANION GAP SERPL CALC-SCNC: 9 MMOL/L
ANION GAP SERPL CALC-SCNC: 9 MMOL/L
AST SERPL-CCNC: 22 U/L
AST SERPL-CCNC: 23 U/L
BASOPHILS # BLD AUTO: 0 K/UL
BASOPHILS # BLD AUTO: 0.03 K/UL
BASOPHILS NFR BLD: 0.1 %
BASOPHILS NFR BLD: 0.5 %
BILIRUB SERPL-MCNC: 0.3 MG/DL
BILIRUB SERPL-MCNC: 0.4 MG/DL
BUN SERPL-MCNC: 27 MG/DL
BUN SERPL-MCNC: 27 MG/DL
C DIPHTHERIAE AB SER IA-ACNC: 0.43 IU/ML
C TETANI AB SER-ACNC: 0.88 IU/ML
CALCIUM SERPL-MCNC: 8.8 MG/DL
CALCIUM SERPL-MCNC: 9.1 MG/DL
CHLORIDE SERPL-SCNC: 108 MMOL/L
CHLORIDE SERPL-SCNC: 108 MMOL/L
CO2 SERPL-SCNC: 23 MMOL/L
CO2 SERPL-SCNC: 24 MMOL/L
CREAT SERPL-MCNC: 3.1 MG/DL
CREAT SERPL-MCNC: 3.1 MG/DL
CREAT UR-MCNC: 227.7 MG/DL
DEPRECATED S PNEUM 1 IGG SER-MCNC: 1.7 MCG/ML
DEPRECATED S PNEUM12 IGG SER-MCNC: <0.3 MCG/ML
DEPRECATED S PNEUM14 IGG SER-MCNC: 4.4 MCG/ML
DEPRECATED S PNEUM19 IGG SER-MCNC: 0.4 MCG/ML
DEPRECATED S PNEUM23 IGG SER-MCNC: <0.3 MCG/ML
DEPRECATED S PNEUM3 IGG SER-MCNC: 2 MCG/ML
DEPRECATED S PNEUM4 IGG SER-MCNC: <0.3 MCG/ML
DEPRECATED S PNEUM5 IGG SER-MCNC: 2 MCG/ML
DEPRECATED S PNEUM8 IGG SER-MCNC: 0.7 MCG/ML
DEPRECATED S PNEUM9 IGG SER-MCNC: 1.2 MCG/ML
DIFFERENTIAL METHOD: ABNORMAL
DIFFERENTIAL METHOD: ABNORMAL
EOSINOPHIL # BLD AUTO: 0.2 K/UL
EOSINOPHIL # BLD AUTO: 0.2 K/UL
EOSINOPHIL NFR BLD: 3.3 %
EOSINOPHIL NFR BLD: 4 %
ERYTHROCYTE [DISTWIDTH] IN BLOOD BY AUTOMATED COUNT: 13.2 %
ERYTHROCYTE [DISTWIDTH] IN BLOOD BY AUTOMATED COUNT: 13.6 %
EST. GFR  (AFRICAN AMERICAN): 21 ML/MIN/1.73 M^2
EST. GFR  (AFRICAN AMERICAN): 21.3 ML/MIN/1.73 M^2
EST. GFR  (NON AFRICAN AMERICAN): 18 ML/MIN/1.73 M^2
EST. GFR  (NON AFRICAN AMERICAN): 18.4 ML/MIN/1.73 M^2
FERRITIN SERPL-MCNC: 118 NG/ML
GLUCOSE SERPL-MCNC: 113 MG/DL
GLUCOSE SERPL-MCNC: 131 MG/DL
HAEM INFLU B IGG SER-MCNC: <0.11 MG/L
HCT VFR BLD AUTO: 37.1 %
HCT VFR BLD AUTO: 37.2 %
HGB BLD-MCNC: 12.3 G/DL
HGB BLD-MCNC: 12.6 G/DL
IRON SERPL-MCNC: 74 UG/DL
LYMPHOCYTES # BLD AUTO: 2.1 K/UL
LYMPHOCYTES # BLD AUTO: 2.6 K/UL
LYMPHOCYTES NFR BLD: 36.2 %
LYMPHOCYTES NFR BLD: 40.7 %
MCH RBC QN AUTO: 30.1 PG
MCH RBC QN AUTO: 30.7 PG
MCHC RBC AUTO-ENTMCNC: 33.2 %
MCHC RBC AUTO-ENTMCNC: 33.8 %
MCV RBC AUTO: 91 FL
MCV RBC AUTO: 91 FL
MONOCYTES # BLD AUTO: 0.4 K/UL
MONOCYTES # BLD AUTO: 0.5 K/UL
MONOCYTES NFR BLD: 6.3 %
MONOCYTES NFR BLD: 7.8 %
NEUTROPHILS # BLD AUTO: 3 K/UL
NEUTROPHILS # BLD AUTO: 3.1 K/UL
NEUTROPHILS NFR BLD: 48.7 %
NEUTROPHILS NFR BLD: 51.9 %
PHOSPHATE SERPL-MCNC: 3.3 MG/DL
PLATELET # BLD AUTO: 188 K/UL
PLATELET # BLD AUTO: 210 K/UL
PMV BLD AUTO: 10 FL
PMV BLD AUTO: 7.2 FL
POTASSIUM SERPL-SCNC: 3.9 MMOL/L
POTASSIUM SERPL-SCNC: 4 MMOL/L
PROT SERPL-MCNC: 6.9 G/DL
PROT SERPL-MCNC: 7.1 G/DL
PROT UR-MCNC: 637 MG/DL
PROT/CREAT RATIO, UR: 2.8
PTH-INTACT SERPL-MCNC: 218.6 PG/ML
RBC # BLD AUTO: 4.09 M/UL
RBC # BLD AUTO: 4.11 M/UL
S PNEUM DA 18C IGG SER-MCNC: <0.3 MCG/ML
S PNEUM DA 6B IGG SER-MCNC: 0.7 MCG/ML
S PNEUM DA 7F IGG SER-MCNC: 0.6 MCG/ML
S PNEUM DA 9V IGG SER-MCNC: 0.4 MCG/ML
SATURATED IRON: 20 %
SODIUM SERPL-SCNC: 140 MMOL/L
SODIUM SERPL-SCNC: 141 MMOL/L
TOTAL IRON BINDING CAPACITY: 364 UG/DL
TRANSFERRIN SERPL-MCNC: 246 MG/DL
TSH SERPL DL<=0.005 MIU/L-ACNC: 0.79 UIU/ML
WBC # BLD AUTO: 5.8 K/UL
WBC # BLD AUTO: 6.32 K/UL

## 2017-04-26 PROCEDURE — 36415 COLL VENOUS BLD VENIPUNCTURE: CPT | Mod: PO

## 2017-04-26 PROCEDURE — 84466 ASSAY OF TRANSFERRIN: CPT

## 2017-04-26 PROCEDURE — 99214 OFFICE O/P EST MOD 30 MIN: CPT | Mod: S$PBB,,, | Performed by: PHYSICIAN ASSISTANT

## 2017-04-26 PROCEDURE — 84443 ASSAY THYROID STIM HORMONE: CPT

## 2017-04-26 PROCEDURE — 80053 COMPREHEN METABOLIC PANEL: CPT

## 2017-04-26 PROCEDURE — 83970 ASSAY OF PARATHORMONE: CPT

## 2017-04-26 PROCEDURE — 80053 COMPREHEN METABOLIC PANEL: CPT | Mod: 91

## 2017-04-26 PROCEDURE — 82728 ASSAY OF FERRITIN: CPT

## 2017-04-26 PROCEDURE — 36415 COLL VENOUS BLD VENIPUNCTURE: CPT

## 2017-04-26 PROCEDURE — 85025 COMPLETE CBC W/AUTO DIFF WBC: CPT

## 2017-04-26 PROCEDURE — 82570 ASSAY OF URINE CREATININE: CPT

## 2017-04-26 PROCEDURE — 99999 PR PBB SHADOW E&M-EST. PATIENT-LVL V: CPT | Mod: PBBFAC,,, | Performed by: PHYSICIAN ASSISTANT

## 2017-04-26 PROCEDURE — 84100 ASSAY OF PHOSPHORUS: CPT

## 2017-04-26 PROCEDURE — 85025 COMPLETE CBC W/AUTO DIFF WBC: CPT | Mod: 91

## 2017-04-26 PROCEDURE — 83540 ASSAY OF IRON: CPT

## 2017-04-26 PROCEDURE — 81000 URINALYSIS NONAUTO W/SCOPE: CPT

## 2017-04-26 RX ORDER — TERAZOSIN 5 MG/1
5 CAPSULE ORAL NIGHTLY
Qty: 90 CAPSULE | Refills: 0 | Status: SHIPPED | OUTPATIENT
Start: 2017-04-26 | End: 2017-06-15 | Stop reason: SDUPTHER

## 2017-04-26 RX ORDER — TRAMADOL HYDROCHLORIDE 50 MG/1
50 TABLET ORAL EVERY 6 HOURS PRN
Qty: 120 TABLET | Refills: 4 | Status: SHIPPED | OUTPATIENT
Start: 2017-04-26 | End: 2018-03-08 | Stop reason: SDUPTHER

## 2017-04-26 NOTE — MR AVS SNAPSHOT
[facility]  [address]  [place] [address]  Phone: [phone]  Fax: [phone]                  Micheal Hunt   2017 1:20 PM   Office Visit    Description:  Male : 1940   Provider:  Viktoria Chauhan PA-C   Department:  [place] - [facility]           Reason for Visit     Dizziness           Diagnoses this Visit        Comments    Essential hypertension    -  Primary     CKD (chronic kidney disease), stage III         Dizziness         Anemia, unspecified type         LVH (left ventricular hypertrophy) due to hypertensive disease, without heart failure         Aortic valve regurgitation, unspecified etiology                To Do List           Future Appointments        Provider Department Dept Phone    2017 2:00 PM [facility], [facility] [facility] - Lab [phone]    2017 2:15 PM SPECIMEN, [place] [facility] - Lab [phone]    2017 8:30 AM ECHO, [place] [facility] - Cardiology [phone]    2017 9:30 AM [facility] US2 [facility] [phone]    2017 3:40 PM Viktoria Chauhan PA-C [facility] [phone]      Goals (5 Years of Data)     None       These Medications        Disp Refills Start End    terazosin (HYTRIN) 5 MG capsule 90 capsule 0 2017    Take 1 capsule (5 mg total) by mouth every evening. - Oral    Pharmacy: [facility]/pharmacy #[id number] - [place] - [address] AT [facility] Ph #: [phone]         [facility] On Call     [facility] Care Line - 24/ Assistance  Unless otherwise directed by your provider, please contact [facility], our nurse care line that is available for 24/7 assistance.     Registered nurses in the [facility] provide: appointment scheduling, clinical advisement, health education, and other advisory services.  Call: [phone] (toll free)               Medications           Message regarding  Medications     Verify the changes and/or additions to your medication regime listed below are the same as discussed with your clinician today.  If any of these changes or additions are incorrect, please notify your healthcare provider.        START taking these NEW medications        Refills    terazosin (HYTRIN) 5 MG capsule 0    Sig: Take 1 capsule (5 mg total) by mouth every evening.    Class: Normal    Route: Oral      STOP taking these medications     gabapentin (NEURONTIN) 100 MG capsule 1 by mouth in the morning 1 by mouth in the afternoon and 3 by mouth at bedtime    fexofenadine (ALLEGRA) 180 MG tablet Take 1 tablet (180 mg total) by mouth once daily.    cefUROXime (CEFTIN) 250 MG tablet Take 250 mg by mouth 2 (two) times daily.           Verify that the below list of medications is an accurate representation of the medications you are currently taking.  If none reported, the list may be blank. If incorrect, please contact your healthcare provider. Carry this list with you in case of emergency.           Current Medications     albuterol 90 mcg/actuation inhaler Inhale 2 puffs into the lungs every 6 (six) hours as needed for Wheezing.    albuterol-ipratropium 2.5mg-0.5mg/3mL (DUO-NEB) 0.5 mg-3 mg(2.5 mg base)/3 mL nebulizer solution Take 3 mLs by nebulization every 6 (six) hours as needed for Wheezing. Rescue    allopurinol (ZYLOPRIM) 300 MG tablet Take 1 tablet (300 mg total) by mouth once daily.    amlodipine (NORVASC) 10 MG tablet Take 1 tablet (10 mg total) by mouth before dinner.    aspirin (ECOTRIN) 81 MG EC tablet Take 81 mg by mouth once daily.      atorvastatin (LIPITOR) 80 MG tablet Take 1 tablet (80 mg total) by mouth once daily.    azelastine (ASTELIN) 137 mcg (0.1 %) nasal spray USE 2 SPRAYS TWICE DAILY IN EACH NOSTRIL    benzonatate (TESSALON) 200 MG capsule Take 1 capsule (200 mg total) by mouth 3 (three) times daily as needed for Cough.    calcium-vitamin D 500-125 mg-unit tablet Take 1  "tablet by mouth as needed.     carvedilol (COREG) 6.25 MG tablet Take 1 tablet (6.25 mg total) by mouth 2 (two) times daily with meals.    colchicine 0.6 mg tablet Take 1 tablet (0.6 mg total) by mouth 2 (two) times daily.    cyanocobalamin, vitamin B-12, (VITAMIN B-12) 1,000 mcg Subl Take 1 tablet by mouth daily as needed. Takes 3,000mcg    finasteride (PROSCAR) 5 mg tablet Take 1 tablet (5 mg total) by mouth once daily.    fluticasone (FLONASE) 50 mcg/actuation nasal spray 1 spray by Each Nare route once daily.    inhalation device (AEROCHAMBER PLUS FLOW-VU) Use as directed for inhalation.    meclizine (ANTIVERT) 25 mg tablet Take 1 tablet by mouth daily as needed.    nitroGLYCERIN (NITROSTAT) 0.4 MG SL tablet Place 1 tablet (0.4 mg total) under the tongue every 5 (five) minutes as needed for Chest pain.    predniSONE (DELTASONE) 10 MG tablet Take 10 mg by mouth 2 (two) times daily.    terazosin (HYTRIN) 5 MG capsule Take 1 capsule (5 mg total) by mouth every evening.    tramadol (ULTRAM) 50 mg tablet Take 1 tablet (50 mg total) by mouth every 6 (six) hours as needed.    urea (CARMOL) 40 % Crea Apply topically once daily.    valsartan (DIOVAN) 320 MG tablet Take 1 tablet (320 mg total) by mouth once daily.           Clinical Reference Information           Your Vitals Were     BP Pulse Temp Resp Height Weight    137/87 (BP Location: Right arm, Patient Position: Sitting, BP Method: Automatic) 73 98.1 °F (36.7 °C) (Oral) 16 6' 1" (1.854 m) 113.6 kg (250 lb 7.1 oz)    SpO2 BMI             97% 33.04 kg/m2         Blood Pressure          Most Recent Value    BP  137/87      Allergies as of 4/26/2017     Eplerenone      Immunizations Administered on Date of Encounter - 4/26/2017     None      Orders Placed During Today's Visit      Normal Orders This Visit    Ambulatory referral to Neurology     Future Labs/Procedures Expected by Expires    CBC auto differential  4/26/2017 6/25/2018    Comprehensive metabolic panel  " 4/26/2017 6/25/2018    Ferritin  4/26/2017 6/25/2018    Iron and TIBC  4/26/2017 6/25/2018    TSH  4/26/2017 6/25/2018    Urinalysis  4/26/2017 6/25/2018    US Carotid Bilateral  4/26/2017 4/26/2018    2D echo with color flow doppler  As directed 4/27/2018      Language Assistance Services     ATTENTION: Language assistance services are available, free of charge. Please call [phone].      ATENCIÓN: Si habla español, tiene a nolan disposición servicios gratuitos de asistencia lingüística. Llame al [phone].     CHÚ Ý: N?u b?n nói Ti?ng Vi?t, có các d?ch v? h? tr? ngôn ng? mi?n phí dành cho b?n. G?i s? [phone].         [facility] complies with applicable Federal civil rights laws and does not discriminate on the basis of race, color, national origin, age, disability, or sex.

## 2017-04-26 NOTE — PROGRESS NOTES
Pre-Visit Chart Review  For Appointment Scheduled on 5/9/17    Health Maintenance Due   Topic Date Due    TETANUS VACCINE  09/27/1957    Zoster Vaccine  09/27/1999    Pneumococcal (65+) (2 of 2 - PCV13) 01/12/2017

## 2017-04-26 NOTE — TELEPHONE ENCOUNTER
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 He needs to continue Flonase 2 SEN daily and make sure using azelastine 2 SEN BID. If not controlled may add singulair  Immune system tests are good except not adequately protected against strep pneumoniae. Recommend pneumovax and repeat labs."    Pt expressed understanding said he will get the pneumovax at [facility] today.   "

## 2017-04-26 NOTE — PROGRESS NOTES
Subjective:       Patient ID: Micheal Hunt is a 76 y.o. male.    Chief Complaint: Dizziness    Dizziness:   Chronicity:  New  Onset:  More than 1 year ago  Progression since onset:  Gradually worsening  Frequency:  Constantly  Severity:  Moderate  Dizziness characteristics:  Spacial disorientation, sensation of movement and off-balance  Frequency of Spells:  Daily   Associated symptoms: light-headedness.no hearing loss, no ear congestion, no fever, no headaches, no tinnitus, no nausea, no vomiting, no aural fullness, no weakness, no visual disturbances, no syncope, no palpitations, no panic, no numbness in extremities and no chest pain.  Aggravated by:  Position changes and getting up  Treatments tried:  Nothing   PMH includes: environmental allergies.no strokes, no head trauma, no balance testing, no head trauma, no ear infections, no anxiety and no ear tubes.    Review of Systems   Constitutional: Negative for activity change, appetite change and fever.   HENT: Negative for hearing loss, postnasal drip, rhinorrhea, sinus pressure and tinnitus.    Eyes: Negative for visual disturbance.   Respiratory: Negative for cough and shortness of breath.    Cardiovascular: Negative for chest pain, palpitations and syncope.   Gastrointestinal: Negative for abdominal distention, abdominal pain, nausea and vomiting.   Genitourinary: Negative for difficulty urinating and dysuria.   Musculoskeletal: Negative for arthralgias and myalgias.   Allergic/Immunologic: Positive for environmental allergies.   Neurological: Positive for dizziness and light-headedness. Negative for weakness and headaches.   Hematological: Negative for adenopathy.   Psychiatric/Behavioral: The patient is not nervous/anxious.        Objective:      Physical Exam   Constitutional: He is oriented to person, place, and time.   Orthostatics taken  Lying 141/78 (65)  Sitting 128/76 (78)  Standing 114/78 (77)   HENT:   Mouth/Throat: Oropharynx is clear and  moist. No oropharyngeal exudate.   Eyes: Conjunctivae are normal. Pupils are equal, round, and reactive to light.   Cardiovascular: Normal rate and regular rhythm.    Murmur heard.  Pulmonary/Chest: Effort normal and breath sounds normal. He has no wheezes.   Abdominal: Soft. Bowel sounds are normal. There is no tenderness.   Musculoskeletal: He exhibits no edema.   Lymphadenopathy:     He has no cervical adenopathy.   Neurological: He is alert and oriented to person, place, and time.   Skin: No erythema.   Psychiatric: His behavior is normal.       Assessment:       1. Essential hypertension    2. CKD (chronic kidney disease), stage III, eGFR 40, 7/2014    3. Dizziness    4. Anemia, unspecified type    5. LVH (left ventricular hypertrophy) due to hypertensive disease, without heart failure    6. Aortic valve regurgitation, unspecified etiology        Plan:   Micheal was seen today for dizziness.    Diagnoses and all orders for this visit:    Essential hypertension  -     Comprehensive metabolic panel; Future  -     TSH; Future  -     Urinalysis; Future  -     Ferritin; Future  -     Iron and TIBC; Future  -     2D echo with color flow doppler; Future  -     US Carotid Bilateral; Future  -     terazosin (HYTRIN) 5 MG capsule; Take 1 capsule (5 mg total) by mouth every evening.  Controlled  Low salt diet    CKD (chronic kidney disease), stage III, eGFR 40, 7/2014  -     Comprehensive metabolic panel; Future  -     TSH; Future  -     Urinalysis; Future  -     Ferritin; Future  -     Iron and TIBC; Future  Follow up with Dr. Rojo  Dizziness  -     CBC auto differential; Future  -     Comprehensive metabolic panel; Future  -     TSH; Future  -     Urinalysis; Future  -     Ferritin; Future  -     Iron and TIBC; Future  -     2D echo with color flow doppler; Future  -     US Carotid Bilateral; Future  -     Ambulatory referral to Neurology  Reduce hytrin to 5mg to try to reduce dizziness  Follow up in 2 weeks   Anemia,  unspecified type  -     CBC auto differential; Future  -     Ferritin; Future  -     Iron and TIBC; Future    LVH (left ventricular hypertrophy) due to hypertensive disease, without heart failure  -     2D echo with color flow doppler; Future  -     US Carotid Bilateral; Future    Aortic valve regurgitation, unspecified etiology  -     2D echo with color flow doppler; Future  -     US Carotid Bilateral; Future

## 2017-04-26 NOTE — TELEPHONE ENCOUNTER
----- Message from Sindhu Schuster sent at 4/26/2017  1:47 PM CDT -----  Pt was seen today and needs an appt for Dizziness  Please call him @ [phone]  Thanks !

## 2017-04-28 ENCOUNTER — HOSPITAL ENCOUNTER (OUTPATIENT)
Dept: RADIOLOGY | Facility: HOSPITAL | Age: 77
Discharge: HOME | End: 2017-04-28
Attending: FAMILY MEDICINE
Payer: MEDICARE

## 2017-04-28 ENCOUNTER — CLINICAL SUPPORT (OUTPATIENT)
Dept: CARDIOLOGY | Facility: CLINIC | Age: 77
End: 2017-04-28
Payer: MEDICARE

## 2017-04-28 DIAGNOSIS — I11.9 LVH (LEFT VENTRICULAR HYPERTROPHY) DUE TO HYPERTENSIVE DISEASE, WITHOUT HEART FAILURE: ICD-10-CM

## 2017-04-28 DIAGNOSIS — I35.1 AORTIC VALVE REGURGITATION, UNSPECIFIED ETIOLOGY: ICD-10-CM

## 2017-04-28 DIAGNOSIS — R42 DIZZINESS: ICD-10-CM

## 2017-04-28 DIAGNOSIS — I10 ESSENTIAL HYPERTENSION: ICD-10-CM

## 2017-04-28 LAB
AORTIC VALVE REGURGITATION: NORMAL
ESTIMATED PA SYSTOLIC PRESSURE: 25.2
MITRAL VALVE REGURGITATION: NORMAL
RETIRED EF AND QEF - SEE NOTES: 57 (ref 55–65)
TRICUSPID VALVE REGURGITATION: NORMAL

## 2017-04-28 PROCEDURE — 93880 EXTRACRANIAL BILAT STUDY: CPT | Mod: TC

## 2017-04-28 PROCEDURE — 93306 TTE W/DOPPLER COMPLETE: CPT | Mod: PBBFAC,PO | Performed by: INTERNAL MEDICINE

## 2017-04-28 PROCEDURE — 93880 EXTRACRANIAL BILAT STUDY: CPT | Mod: 26,,, | Performed by: RADIOLOGY

## 2017-05-06 DIAGNOSIS — N40.0 BENIGN NON-NODULAR PROSTATIC HYPERPLASIA WITHOUT LOWER URINARY TRACT SYMPTOMS: ICD-10-CM

## 2017-05-08 ENCOUNTER — OFFICE VISIT (OUTPATIENT)
Dept: PHYSICAL MEDICINE AND REHAB | Facility: CLINIC | Age: 77
End: 2017-05-08
Payer: MEDICARE

## 2017-05-08 VITALS
HEART RATE: 70 BPM | HEIGHT: 73 IN | SYSTOLIC BLOOD PRESSURE: 146 MMHG | BODY MASS INDEX: 33.41 KG/M2 | DIASTOLIC BLOOD PRESSURE: 87 MMHG | WEIGHT: 252.13 LBS

## 2017-05-08 DIAGNOSIS — G89.29 CHRONIC LEFT-SIDED LOW BACK PAIN WITHOUT SCIATICA: ICD-10-CM

## 2017-05-08 DIAGNOSIS — M17.11 PRIMARY OSTEOARTHRITIS OF RIGHT KNEE: Primary | ICD-10-CM

## 2017-05-08 DIAGNOSIS — M54.50 CHRONIC LEFT-SIDED LOW BACK PAIN WITHOUT SCIATICA: ICD-10-CM

## 2017-05-08 DIAGNOSIS — M25.561 CHRONIC PAIN OF RIGHT KNEE: ICD-10-CM

## 2017-05-08 DIAGNOSIS — G89.29 CHRONIC PAIN OF RIGHT KNEE: ICD-10-CM

## 2017-05-08 PROCEDURE — 99999 PR PBB SHADOW E&M-EST. PATIENT-LVL III: CPT | Mod: 25,PBBFAC,, | Performed by: PHYSICAL MEDICINE & REHABILITATION

## 2017-05-08 PROCEDURE — 64450 NJX AA&/STRD OTHER PN/BRANCH: CPT | Mod: S$PBB,,, | Performed by: PHYSICAL MEDICINE & REHABILITATION

## 2017-05-08 PROCEDURE — 76942 ECHO GUIDE FOR BIOPSY: CPT | Mod: 26,S$PBB,, | Performed by: PHYSICAL MEDICINE & REHABILITATION

## 2017-05-08 PROCEDURE — 76942 ECHO GUIDE FOR BIOPSY: CPT | Mod: PBBFAC,PN | Performed by: PHYSICAL MEDICINE & REHABILITATION

## 2017-05-08 PROCEDURE — 99214 OFFICE O/P EST MOD 30 MIN: CPT | Mod: 25,S$PBB,, | Performed by: PHYSICAL MEDICINE & REHABILITATION

## 2017-05-08 PROCEDURE — 99213 OFFICE O/P EST LOW 20 MIN: CPT | Mod: 25,PBBFAC,PN | Performed by: PHYSICAL MEDICINE & REHABILITATION

## 2017-05-08 PROCEDURE — 64450 NJX AA&/STRD OTHER PN/BRANCH: CPT | Mod: PBBFAC,PN | Performed by: PHYSICAL MEDICINE & REHABILITATION

## 2017-05-08 RX ORDER — FINASTERIDE 5 MG/1
TABLET, FILM COATED ORAL
Qty: 90 TABLET | Refills: 3 | Status: SHIPPED | OUTPATIENT
Start: 2017-05-08 | End: 2018-07-06 | Stop reason: SDUPTHER

## 2017-05-08 RX ORDER — LIDOCAINE HYDROCHLORIDE 10 MG/ML
10 INJECTION INFILTRATION; PERINEURAL ONCE
Status: COMPLETED | OUTPATIENT
Start: 2017-05-08 | End: 2017-05-08

## 2017-05-08 RX ORDER — PNEUMOCOCCAL 13-VALENT CONJUGATE VACCINE 2.2; 2.2; 2.2; 2.2; 2.2; 4.4; 2.2; 2.2; 2.2; 2.2; 2.2; 2.2; 2.2 UG/.5ML; UG/.5ML; UG/.5ML; UG/.5ML; UG/.5ML; UG/.5ML; UG/.5ML; UG/.5ML; UG/.5ML; UG/.5ML; UG/.5ML; UG/.5ML; UG/.5ML
INJECTION, SUSPENSION INTRAMUSCULAR
Refills: 0 | COMMUNITY
Start: 2017-04-27 | End: 2017-05-27 | Stop reason: ALTCHOICE

## 2017-05-08 RX ADMIN — LIDOCAINE HYDROCHLORIDE 10 ML: 10 INJECTION INFILTRATION; PERINEURAL at 01:05

## 2017-05-08 NOTE — MR AVS SNAPSHOT
[facility]  [address] [place] [address] Drive  [place] [address]  Phone: [phone]  Fax: [phone]                  Micheal Hunt   2017 12:00 PM   Office Visit    Description:  Male : 1940   Provider:  Yanira Whitehead DO   Department:  [facility]           Diagnoses this Visit        Comments    Primary osteoarthritis of right knee    -  Primary     Chronic left-sided low back pain without sciatica         Chronic pain of right knee                To Do List           Future Appointments        Provider Department Dept Phone    2017 11:00 AM [facility] XR2 [facility] [phone]    2017 3:40 PM Viktoria Chauhan PA-C [facility] [phone]    5/10/2017 3:00 PM Frankie Jones [facility] [phone]    2017 9:00 AM Yanira Whitehead DO [facility] [phone]    2017 1:00 PM Getachew Dunlap MD [facility] - Cardiology [phone]      Goals (5 Years of Data)     None      Follow-Up and Disposition     Return in about 4 weeks (around 2017).    Follow-up and Disposition History      [facility] On [facility]     [facility] Nurse Care Line - 24/7 Assistance  Unless otherwise directed by your provider, please contact [facility], our nurse care line that is available for 24/7 assistance.     Registered nurses in the [facility] provide: appointment scheduling, clinical advisement, health education, and other advisory services.  Call: [phone] (toll free)               Medications           Message regarding Medications     Verify the changes and/or additions to your medication regime listed below are the same as discussed with your clinician today.  If any of these changes or additions are incorrect, please notify your healthcare provider.        These medications were administered today        Dose Freq    lidocaine HCL 10 mg/ml (1%)  injection 10 mL 10 mL Once    Sig: 10 mLs by Other route once.    Class: Normal    Route: Other           Verify that the below list of medications is an accurate representation of the medications you are currently taking.  If none reported, the list may be blank. If incorrect, please contact your healthcare provider. Carry this list with you in case of emergency.           Current Medications     albuterol 90 mcg/actuation inhaler Inhale 2 puffs into the lungs every 6 (six) hours as needed for Wheezing.    albuterol-ipratropium 2.5mg-0.5mg/3mL (DUO-NEB) 0.5 mg-3 mg(2.5 mg base)/3 mL nebulizer solution Take 3 mLs by nebulization every 6 (six) hours as needed for Wheezing. Rescue    allopurinol (ZYLOPRIM) 300 MG tablet Take 1 tablet (300 mg total) by mouth once daily.    amlodipine (NORVASC) 10 MG tablet Take 1 tablet (10 mg total) by mouth before dinner.    aspirin (ECOTRIN) 81 MG EC tablet Take 81 mg by mouth once daily.      atorvastatin (LIPITOR) 80 MG tablet Take 1 tablet (80 mg total) by mouth once daily.    azelastine (ASTELIN) 137 mcg (0.1 %) nasal spray USE 2 SPRAYS TWICE DAILY IN EACH NOSTRIL    benzonatate (TESSALON) 200 MG capsule Take 1 capsule (200 mg total) by mouth 3 (three) times daily as needed for Cough.    calcium-vitamin D 500-125 mg-unit tablet Take 1 tablet by mouth as needed.     carvedilol (COREG) 6.25 MG tablet Take 1 tablet (6.25 mg total) by mouth 2 (two) times daily with meals.    colchicine 0.6 mg tablet Take 1 tablet (0.6 mg total) by mouth 2 (two) times daily.    cyanocobalamin, vitamin B-12, (VITAMIN B-12) 1,000 mcg Subl Take 1 tablet by mouth daily as needed. Takes 3,000mcg    finasteride (PROSCAR) 5 mg tablet Take 1 tablet (5 mg total) by mouth once daily.    finasteride (PROSCAR) 5 mg tablet TAKE 1 TABLET ONCE DAILY.    fluticasone (FLONASE) 50 mcg/actuation nasal spray 1 spray by Each Nare route once daily.    inhalation device (AEROCHAMBER PLUS FLOW-VU) Use as directed for  "inhalation.    meclizine (ANTIVERT) 25 mg tablet Take 1 tablet by mouth daily as needed.    predniSONE (DELTASONE) 10 MG tablet Take 10 mg by mouth 2 (two) times daily.    PREVNAR 13, PF, 0.5 mL Syrg TO BE ADMINISTERED BY PHARMACIST FOR IMMUNIZATION    terazosin (HYTRIN) 5 MG capsule Take 1 capsule (5 mg total) by mouth every evening.    tramadol (ULTRAM) 50 mg tablet Take 1 tablet (50 mg total) by mouth every 6 (six) hours as needed.    urea (CARMOL) 40 % Crea Apply topically once daily.    valsartan (DIOVAN) 320 MG tablet Take 1 tablet (320 mg total) by mouth once daily.    nitroGLYCERIN (NITROSTAT) 0.4 MG SL tablet Place 1 tablet (0.4 mg total) under the tongue every 5 (five) minutes as needed for Chest pain.           Clinical Reference Information           Your Vitals Were     BP Pulse Height Weight BMI    146/87 70 6' 1" (1.854 m) 114.4 kg (252 lb 1.5 oz) 33.26 kg/m2      Blood Pressure          Most Recent Value    BP  (!)  146/87      Allergies as of 5/8/2017     Eplerenone      Immunizations Administered on Date of Encounter - 5/8/2017     None      Orders Placed During Today's Visit      Normal Orders This Visit    Medication Pre-Authorization     [facility] Guidance For Needle Placement     Future Labs/Procedures Expected by Expires    X-Ray Knee Complete 4 Or More Views Right  5/8/2017 5/8/2018      Language Assistance Services     ATTENTION: Language assistance services are available, free of charge. Please call [phone].      ATENCIÓN: Si habla ozzyfaraz, tiene a nolan disposición servicios gratuitos de asistencia lingüística. Llame al [phone].     MANJULA Ý: N?u b?n nói Ti?ng Vi?t, có các d?ch v? h? tr? ngôn ng? mi?n phí dành cho b?n. G?i s? [phone].         [facility] complies with applicable Federal civil rights laws and does not discriminate on the basis of race, color, national origin, age, disability, or sex.        "

## 2017-05-08 NOTE — PROGRESS NOTES
HPI:  Patient is a 76 y.o. year old male w. Knee pain. S/p left saph nerve block. His pain is gone. He would like to go to therapy now.Today he is here for a cluneal nerve block +hydrodissection. His right knee started hurting  Imaging 2011  AP standing views of both knees as well as a lateral and sunrise views of the right knee and a sunrise view of the left knee were obtained.  A left total knee prosthesis appears well aligned and positioned.  Vascular calcifications are noted bilaterally.  There is medial joint space narrowing on the right and mild tricompartmental osteophyte formation.   Impression    Moderate tricompartmental osteophyte formation about the right knee in this patient status post left total knee replacement.  ______________________________________            Past Medical History:   Diagnosis Date    Allergy     Arthritis     Gout    BPH (benign prostatic hyperplasia)     CAD (coronary artery disease) 2006    Chronic kidney disease     due to ibuprofen    Colon polyp     Diverticulosis     GERD (gastroesophageal reflux disease)     HTN (hypertension) 3/27/2012    Hyperlipidemia     LVH (left ventricular hypertrophy)     Murmur, cardiac 3/27/2012    GRICELDA (obstructive sleep apnea)     DOES NOT USE A MACHINE    Sinus problem     Syncope and collapse      Past Surgical History:   Procedure Laterality Date    APPENDECTOMY      CARDIAC CATHETERIZATION      COLONOSCOPY  2011    CORONARY ARTERY BYPASS GRAFT  4/2007    x 1    JOINT REPLACEMENT      left knee total replacement  X 3    mid leftt finger      from a cactuss    MOLE REMOVAL  2016    rotative cuff      no rotative cuffs on bilat shoulders has pins     SHOULDER SURGERY      shoulder surgery bilat  RIGHT X 4; LEFT X 3     Family History   Problem Relation Age of Onset    Heart disease Mother     Sudden death Father     Cancer Father     Stroke Sister     Heart disease Sister     Kidney cancer Neg Hx     Prostate  cancer Neg Hx     Urolithiasis Neg Hx     Allergic rhinitis Neg Hx     Allergies Neg Hx     Angioedema Neg Hx     Asthma Neg Hx     Atopy Neg Hx     Eczema Neg Hx     Immunodeficiency Neg Hx     Rhinitis Neg Hx     Urticaria Neg Hx      Social History     Social History    Marital status:      Spouse name: N/A    Number of children: N/A    Years of education: N/A     Social History Main Topics    Smoking status: Never Smoker    Smokeless tobacco: Never Used    Alcohol use Yes      Comment: rare    Drug use: No    Sexual activity: Not on file     Other Topics Concern    Not on file     Social History Narrative       Review of patient's allergies indicates:   Allergen Reactions    Eplerenone Other (See Comments)     Marked bradycardia, 40, tiredness and weakness         Current Outpatient Prescriptions:     albuterol 90 mcg/actuation inhaler, Inhale 2 puffs into the lungs every 6 (six) hours as needed for Wheezing., Disp: 1 each, Rfl: 11    albuterol-ipratropium 2.5mg-0.5mg/3mL (DUO-NEB) 0.5 mg-3 mg(2.5 mg base)/3 mL nebulizer solution, Take 3 mLs by nebulization every 6 (six) hours as needed for Wheezing. Rescue, Disp: 30 vial, Rfl: 0    allopurinol (ZYLOPRIM) 300 MG tablet, Take 1 tablet (300 mg total) by mouth once daily., Disp: 90 tablet, Rfl: 3    amlodipine (NORVASC) 10 MG tablet, Take 1 tablet (10 mg total) by mouth before dinner., Disp: 90 tablet, Rfl: 3    aspirin (ECOTRIN) 81 MG EC tablet, Take 81 mg by mouth once daily.  , Disp: , Rfl:     atorvastatin (LIPITOR) 80 MG tablet, Take 1 tablet (80 mg total) by mouth once daily., Disp: 90 tablet, Rfl: 3    azelastine (ASTELIN) 137 mcg (0.1 %) nasal spray, USE 2 SPRAYS TWICE DAILY IN EACH NOSTRIL, Disp: , Rfl: 5    benzonatate (TESSALON) 200 MG capsule, Take 1 capsule (200 mg total) by mouth 3 (three) times daily as needed for Cough., Disp: 30 capsule, Rfl: 0    calcium-vitamin D 500-125 mg-unit tablet, Take 1 tablet by mouth  as needed. , Disp: , Rfl:     carvedilol (COREG) 6.25 MG tablet, Take 1 tablet (6.25 mg total) by mouth 2 (two) times daily with meals., Disp: 90 tablet, Rfl: 3    colchicine 0.6 mg tablet, Take 1 tablet (0.6 mg total) by mouth 2 (two) times daily., Disp: 10 tablet, Rfl: 3    cyanocobalamin, vitamin B-12, (VITAMIN B-12) 1,000 mcg Subl, Take 1 tablet by mouth daily as needed. Takes 3,000mcg, Disp: , Rfl:     finasteride (PROSCAR) 5 mg tablet, Take 1 tablet (5 mg total) by mouth once daily., Disp: 90 tablet, Rfl: 3    finasteride (PROSCAR) 5 mg tablet, TAKE 1 TABLET ONCE DAILY., Disp: 90 tablet, Rfl: 3    fluticasone (FLONASE) 50 mcg/actuation nasal spray, 1 spray by Each Nare route once daily., Disp: 1 Bottle, Rfl: 0    inhalation device (AEROCHAMBER PLUS FLOW-VU), Use as directed for inhalation., Disp: 1 Device, Rfl: 0    meclizine (ANTIVERT) 25 mg tablet, Take 1 tablet by mouth daily as needed., Disp: , Rfl:     predniSONE (DELTASONE) 10 MG tablet, Take 10 mg by mouth 2 (two) times daily., Disp: , Rfl: 0    PREVNAR 13, PF, 0.5 mL Syrg, TO BE ADMINISTERED BY PHARMACIST FOR IMMUNIZATION, Disp: , Rfl: 0    terazosin (HYTRIN) 5 MG capsule, Take 1 capsule (5 mg total) by mouth every evening., Disp: 90 capsule, Rfl: 0    tramadol (ULTRAM) 50 mg tablet, Take 1 tablet (50 mg total) by mouth every 6 (six) hours as needed., Disp: 120 tablet, Rfl: 4    urea (CARMOL) 40 % Crea, Apply topically once daily., Disp: 85 g, Rfl: 11    valsartan (DIOVAN) 320 MG tablet, Take 1 tablet (320 mg total) by mouth once daily., Disp: 90 tablet, Rfl: 3    nitroGLYCERIN (NITROSTAT) 0.4 MG SL tablet, Place 1 tablet (0.4 mg total) under the tongue every 5 (five) minutes as needed for Chest pain., Disp: 25 tablet, Rfl: 4        Review of Systems  No nausea, vomiting, fevers, Chills , contipation, diarrhea or sweats    Physical Exam:      Vitals:    05/08/17 1209   BP: (!) 146/87   Pulse: 70     alert and oriented ×4 follows commands  answers all questions appropriately,affect wnl  Manual muscle test 5 out of 5 sensation to light touch grossly intact  +excruciate tenderness b/l QL and paraspinals lumbar  Antalgic gait  No C/C/E  Healed incision scar left knee  Positive crepitus right knee  No knee laxity  Antalgic gait  No knee effusion no clubbing cyanosis or edema      Assessment:  B/l knee oa  S/p tkr s/p saphenous nerve block- pain resolved  htn suboptimal  Plan:  Right knee xray  Prior auth synvisc  Cluneal nerve block today left  He has appt. W.pcp regarding bp tomm           PROCEDURE NOTE:   risk and benefit of left CLUNEAL NERVE BLOCK AND HYDRODISEECTION injection reviewed with. patient. Verbal consent was obtained. Injection was performed after sterile prep w. Betadine. sHORT AXIS APPROACH UTILIZED. First, VISUALIZATION OF THE PSIS  Was obtained, subsequently  BLOCK + HYDRODISSECTION OF CLUNEAL NERVE PERFORMED. SECOND VISUALIZATION OF THE GLUTEUS MAX AND GLUTEUS MED FASCIAL LINE OBTAINED AND subsequenly BLOCK +HYDRODISSECTION OF CLUNEAL NERVE PERFORMED. PT. WAS LAYING PRONE. INJECTION WAS PERFORMED WITH A  22g  spinal needle  needle  After a numbing wheal w. Lidocaine performed at both sites.   Ultrasound guidance was utilized for needle visualization. A TOTAL OF 10ML OF LIDOCAINE 1% AND 10ML OF STERILE NORMAL SALINE WAS UTILIZED. No complications. iMMEDIATE IMPROVEMENT OF  PAIN POST PROCEDURE.    Ultrasound interpretation was performed prior to the procedure to identify the target and any adjacent neurovascular structures.  Subsequently, interpretation was performed during real- time needle guidance confirming placement. Post- intervention interpretation was also performed confirming appropriate injectate  flow and hemostasis.  Images indicating needle placement have been saved in [employer].

## 2017-05-09 ENCOUNTER — HOSPITAL ENCOUNTER (OUTPATIENT)
Dept: RADIOLOGY | Facility: HOSPITAL | Age: 77
Discharge: HOME | End: 2017-05-09
Attending: PHYSICAL MEDICINE & REHABILITATION
Payer: MEDICARE

## 2017-05-09 DIAGNOSIS — M54.50 CHRONIC BILATERAL LOW BACK PAIN WITHOUT SCIATICA: ICD-10-CM

## 2017-05-09 DIAGNOSIS — G89.29 CHRONIC BILATERAL LOW BACK PAIN WITHOUT SCIATICA: ICD-10-CM

## 2017-05-09 DIAGNOSIS — G89.29 CHRONIC PAIN OF RIGHT KNEE: ICD-10-CM

## 2017-05-09 DIAGNOSIS — M25.562 LEFT KNEE PAIN, UNSPECIFIED CHRONICITY: ICD-10-CM

## 2017-05-09 DIAGNOSIS — M25.561 CHRONIC PAIN OF RIGHT KNEE: ICD-10-CM

## 2017-05-09 PROCEDURE — 73564 X-RAY EXAM KNEE 4 OR MORE: CPT | Mod: TC,LT

## 2017-05-09 PROCEDURE — 73564 X-RAY EXAM KNEE 4 OR MORE: CPT | Mod: TC,RT

## 2017-05-09 PROCEDURE — 72100 X-RAY EXAM L-S SPINE 2/3 VWS: CPT | Mod: 26,,, | Performed by: RADIOLOGY

## 2017-05-09 PROCEDURE — 73564 X-RAY EXAM KNEE 4 OR MORE: CPT | Mod: 26,RT,, | Performed by: RADIOLOGY

## 2017-05-09 PROCEDURE — 72100 X-RAY EXAM L-S SPINE 2/3 VWS: CPT | Mod: TC

## 2017-05-09 PROCEDURE — 73564 X-RAY EXAM KNEE 4 OR MORE: CPT | Mod: 26,LT,, | Performed by: RADIOLOGY

## 2017-05-10 ENCOUNTER — OFFICE VISIT (OUTPATIENT)
Dept: NEUROLOGY | Facility: CLINIC | Age: 77
End: 2017-05-10
Payer: MEDICARE

## 2017-05-10 VITALS
HEIGHT: 73 IN | RESPIRATION RATE: 20 BRPM | SYSTOLIC BLOOD PRESSURE: 156 MMHG | HEART RATE: 66 BPM | DIASTOLIC BLOOD PRESSURE: 82 MMHG | BODY MASS INDEX: 33.61 KG/M2 | WEIGHT: 253.63 LBS

## 2017-05-10 DIAGNOSIS — I95.1 ORTHOSTATIC HYPOTENSION: ICD-10-CM

## 2017-05-10 DIAGNOSIS — J32.9 CHRONIC SINUSITIS, UNSPECIFIED LOCATION: ICD-10-CM

## 2017-05-10 DIAGNOSIS — H83.2X1 VESTIBULAR DISEQUILIBRIUM INVOLVING RIGHT INNER EAR: Primary | ICD-10-CM

## 2017-05-10 DIAGNOSIS — I95.2 HYPOTENSION DUE TO DRUGS: ICD-10-CM

## 2017-05-10 PROCEDURE — 99999 PR PBB SHADOW E&M-EST. PATIENT-LVL III: CPT | Mod: PBBFAC,,, | Performed by: PSYCHIATRY & NEUROLOGY

## 2017-05-10 PROCEDURE — 99213 OFFICE O/P EST LOW 20 MIN: CPT | Mod: PBBFAC,PN | Performed by: PSYCHIATRY & NEUROLOGY

## 2017-05-10 PROCEDURE — 99205 OFFICE O/P NEW HI 60 MIN: CPT | Mod: S$PBB,,, | Performed by: PSYCHIATRY & NEUROLOGY

## 2017-05-10 RX ORDER — DIAZEPAM 2 MG/1
2 TABLET ORAL EVERY 8 HOURS PRN
Qty: 30 TABLET | Refills: 1 | Status: SHIPPED | OUTPATIENT
Start: 2017-05-10 | End: 2017-05-27 | Stop reason: SDDI

## 2017-05-10 NOTE — PROGRESS NOTES
Subjective:         Patient ID: Micheal Hunt is a ambidextrous 76 y.o.  male who presents for dizziness    History of Present Illness    Reviewed records in [employer].  Mr. Hunt is a 76-year-old man who was seen by Dr. Lakhani and Viktoria Chauhan in April 2017 with a complaint of dizziness and lightheadedness, progressively worsening over the past year.    Orthostatic measurements at that time were significant for drop in BP with increased HR:  Orthostatics taken  Lying 141/78 (65)  Sitting 128/76 (78)  Standing 114/78 (77)     He also has a history of chronic kidney disease, anemia, aortic valve regurgitation.    He was referred for carotid ultrasound which showed less than 50% stenosis bilaterally.  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      Over the past year, adjustments in BP medications have impacted degree of dizziness.  No trouble rolling over, but once he gets up especially if he gets up too fast feels shaky, like on a ship.  If he moves or walks around unsteady again.  Will persist all day, as long as he is up and moving.  Better with sitting or standing still. Seems to notice earplugs may help, or covering one eye seems to help. 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     No diplopia, no HA except for some sinus HA - if sinus pressure is worse dizziness will be worse.  Tx with abx has been helping. No paresthesias in his feet, no tinnitus. No clumsiness with his hands.     Meclizine does no good.  He has been seeing an ear nose and throat specialist, Dr. Nails.      Review of patient's allergies indicates:   Allergen Reactions    Eplerenone Other (See Comments)      Marked bradycardia, 40, tiredness and weakness       Current Outpatient Prescriptions   Medication Sig Dispense Refill    albuterol 90 mcg/actuation inhaler Inhale 2 puffs into the lungs every 6 (six) hours as needed for Wheezing. 1 each 11    albuterol-ipratropium 2.5mg-0.5mg/3mL (DUO-NEB) 0.5 mg-3 mg(2.5 mg base)/3 mL nebulizer solution Take 3 mLs by nebulization every 6 (six) hours as needed for Wheezing. Rescue 30 vial 0    allopurinol (ZYLOPRIM) 300 MG tablet Take 1 tablet (300 mg total) by mouth once daily. 90 tablet 3    amlodipine (NORVASC) 10 MG tablet Take 1 tablet (10 mg total) by mouth before dinner. 90 tablet 3    aspirin (ECOTRIN) 81 MG EC tablet Take 81 mg by mouth once daily.        atorvastatin (LIPITOR) 80 MG tablet Take 1 tablet (80 mg total) by mouth once daily. 90 tablet 3    azelastine (ASTELIN) 137 mcg (0.1 %) nasal spray USE 2 SPRAYS TWICE DAILY IN EACH NOSTRIL  5    benzonatate (TESSALON) 200 MG capsule Take 1 capsule (200 mg total) by mouth 3 (three) times daily as needed for Cough. 30 capsule 0    calcium-vitamin D 500-125 mg-unit tablet Take 1 tablet by mouth as needed.       carvedilol (COREG) 6.25 MG tablet Take 1 tablet (6.25 mg total) by mouth 2 (two) times daily with meals. 90 tablet 3    colchicine 0.6 mg tablet Take 1 tablet (0.6 mg total) by mouth 2 (two) times daily. 10 tablet 3    cyanocobalamin, vitamin B-12, (VITAMIN B-12) 1,000 mcg Subl Take 1 tablet by mouth daily as needed. Takes 3,000mcg      finasteride (PROSCAR) 5 mg tablet TAKE 1 TABLET ONCE DAILY. 90 tablet 3    fluticasone (FLONASE) 50 mcg/actuation nasal spray 1 spray by Each Nare route once daily. 1 Bottle 0    inhalation device (AEROCHAMBER PLUS FLOW-VU) Use as directed for inhalation. 1 Device 0    predniSONE (DELTASONE) 10 MG tablet Take 10 mg by mouth 2 (two) times daily.  0    PREVNAR 13, PF, 0.5 mL Syrg TO BE ADMINISTERED BY PHARMACIST FOR IMMUNIZATION  0    terazosin (HYTRIN) 5 MG capsule  Take 1 capsule (5 mg total) by mouth every evening. 90 capsule 0    tramadol (ULTRAM) 50 mg tablet Take 1 tablet (50 mg total) by mouth every 6 (six) hours as needed. 120 tablet 4    urea (CARMOL) 40 % Crea Apply topically once daily. 85 g 11    valsartan (DIOVAN) 320 MG tablet Take 1 tablet (320 mg total) by mouth once daily. 90 tablet 3    diazePAM (VALIUM) 2 MG tablet Take 1 tablet (2 mg total) by mouth every 8 (eight) hours as needed (vertigo). 30 tablet 1    nitroGLYCERIN (NITROSTAT) 0.4 MG SL tablet Place 1 tablet (0.4 mg total) under the tongue every 5 (five) minutes as needed for Chest pain. 25 tablet 4     No current facility-administered medications for this visit.        Past Medical History  Past Medical History:   Diagnosis Date    Allergy     Arthritis     Gout    BPH (benign prostatic hyperplasia)     CAD (coronary artery disease) 2006    Chronic kidney disease     due to ibuprofen    Colon polyp     Diverticulosis     GERD (gastroesophageal reflux disease)     HTN (hypertension) 3/27/2012    Hyperlipidemia     LVH (left ventricular hypertrophy)     Murmur, cardiac 3/27/2012    GRICELDA (obstructive sleep apnea)     DOES NOT USE A MACHINE    Sinus problem     Syncope and collapse        Past Surgical History  Past Surgical History:   Procedure Laterality Date    APPENDECTOMY      CARDIAC CATHETERIZATION      COLONOSCOPY  2011    CORONARY ARTERY BYPASS GRAFT  4/2007    x 1    JOINT REPLACEMENT      left knee total replacement  X 3    mid leftt finger      from a cactuss    MOLE REMOVAL  2016    rotative cuff      no rotative cuffs on bilat shoulders has pins     SHOULDER SURGERY      shoulder surgery bilat  RIGHT X 4; LEFT X 3       Family History  Family History   Problem Relation Age of Onset    Heart disease Mother     Sudden death Father     Cancer Father     Stroke Sister     Heart disease Sister     Kidney cancer Neg Hx     Prostate cancer Neg Hx     Urolithiasis  Neg Hx     Allergic rhinitis Neg Hx     Allergies Neg Hx     Angioedema Neg Hx     Asthma Neg Hx     Atopy Neg Hx     Eczema Neg Hx     Immunodeficiency Neg Hx     Rhinitis Neg Hx     Urticaria Neg Hx        Social History  Social History     Social History    Marital status:      Spouse name: N/A    Number of children: N/A    Years of education: N/A     Occupational History    Not on file.     Social History Main Topics    Smoking status: Never Smoker    Smokeless tobacco: Never Used    Alcohol use Yes      Comment: rare    Drug use: No    Sexual activity: Not on file     Other Topics Concern    Not on file     Social History Narrative        Review of Systems  Review of Systems   Constitutional: Negative for appetite change, fatigue, fever and unexpected weight change.   HENT: Negative for congestion, hearing loss, nosebleeds, sinus pressure and trouble swallowing.    Eyes: Negative for pain and visual disturbance.   Respiratory: Negative for cough, shortness of breath and wheezing.    Cardiovascular: Negative for chest pain and palpitations.   Gastrointestinal: Negative for abdominal pain, blood in stool, diarrhea, nausea and vomiting.   Endocrine: Negative for cold intolerance and heat intolerance.   Genitourinary: Negative for difficulty urinating, hematuria and urgency.   Musculoskeletal: Negative for arthralgias, back pain, gait problem, myalgias and neck pain.   Skin: Negative for rash and wound.   Neurological: Positive for dizziness and light-headedness. Negative for seizures, weakness and numbness.   Hematological: Does not bruise/bleed easily.   Psychiatric/Behavioral: Negative for decreased concentration, dysphoric mood and sleep disturbance.       Objective:        Vitals:    05/10/17 1501   BP: (!) 156/82   Pulse: 66   Resp: 20     Body mass index is 33.46 kg/(m^2).  Neurologic Exam     Mental Status   Oriented to person, place, and time.   Follows 3 step commands.    Attention: normal. Concentration: normal.   Speech: speech is normal   Level of consciousness: alert  Knowledge: good. Able to perform simple calculations.   Able to name object. Able to repeat. Normal comprehension.        No neglect or apraxia, no psychomotor slowing     Cranial Nerves     CN II   Visual fields full to confrontation.     CN III, IV, VI   Pupils are equal, round, and reactive to light.  Extraocular motions are normal.   CN III: no CN III palsy  CN VI: no CN VI palsy  Nystagmus: none   Diplopia: none  Ophthalmoparesis: none  Upgaze: normal  Downgaze: normal    CN V   Facial sensation intact.     CN VII   Facial expression full, symmetric.     CN VIII   CN VIII normal.   Right Rinne: AC > BC  Left Rinne: AC > BC  Han: does not lateralize        Decreased OKN to the right, normal to the left  No separation on red glass testing     Motor Exam   Muscle bulk: normal  Overall muscle tone: normal  Right arm tone: normal  Left arm tone: normal  Right arm pronator drift: absent  Left arm pronator drift: absent  Right leg tone: normal  Left leg tone: normal    Strength   Strength 5/5 throughout.     Sensory Exam   Light touch normal.   Vibration normal.   Proprioception normal.   Pinprick normal.     Gait, Coordination, and Reflexes     Gait  Gait: normal    Coordination   Romberg: negative  Finger to nose coordination: normal    Tremor   Resting tremor: absent  Action tremor: absent    Reflexes   Right plantar: normal  Left plantar: normal      Physical Exam   Constitutional: He is oriented to person, place, and time. He appears well-developed and well-nourished.   HENT:   Head: Normocephalic and atraumatic.   Eyes: EOM are normal. Pupils are equal, round, and reactive to light.   Cardiovascular: Normal rate and regular rhythm.    Neurological: He is oriented to person, place, and time. He has normal strength. He has a normal Finger-Nose-Finger Test and a normal Romberg Test. Gait normal.   Psychiatric:  He has a normal mood and affect. His speech is normal and behavior is normal. Judgment and thought content normal.   Vitals reviewed.        Data Review:     2D Echo 4/28/17:    CONCLUSIONS     1 - Mildly enlarged aortic root.     2 - Biatrial enlargement.     3 - Concentric hypertrophy.     4 - No wall motion abnormalities.     5 - Normal left ventricular systolic function (EF 55-60%).     6 - Mild aortic regurgitation.     7 - Mild mitral regurgitation.     8 - Indeterminate LV diastolic function.     9 - Normal right ventricular systolic function .     10 - The estimated PA systolic pressure is greater than 25 mmHg.     11 - Suggest some improvement in LVEF from Echo in 3/2016.       Narrative   Comparison: 1/10/17    Technique: Axial 1.25 mm noncontrast images are reviewed in the paranasal sinuses along with coronal and sagittal reconstructions.    Findings: The maxillary sinuses demonstrate mild diffuse mucosal thickening. The maxillary antra are patent. No maxillary air-fluid level is seen. Mild rightward nasal septal deviation is seen. There has been interval development of moderate mucosal thickening within the right mid and posterior ethmoid sinus and within the lateral sphenoid sinuses, right greater than left, without air-fluid level identified. There is opacification of the sphenoethmoid recesses bilaterally. The left ethmoid air cells are relatively clear. The bilateral frontal sinuses are clear. Right frontoethmoid recess demonstrates mild mucosal thickening.    The mastoid air cells are clear. Atherosclerosis is seen in the anterior and posterior circulations.    Grossly, no acute parenchymal abnormality is seen in the visualized brain on this bone algorithm study. There is chronic white matter disease.   Impression     1.  Interval development of moderate right ethmoid and right sphenoid sinusitis and compared to the 1/10/17 study. No air-fluid level is present but an acute component is  possible.      Electronically signed by: Shun Pacheco MD  Date: 03/28/17  Time: 12:06            This document has been electronically    SIGNED BY: Getachew Dunlap MD On: 04/28/2017 16:14    Results for ARTEM WEI (MRN [id number]) as of 5/10/2017 15:17   Ref. Range 4/26/2017 13:55 4/26/2017 14:15   WBC Latest Ref Range: 3.90 - 12.70 K/uL 5.80    RBC Latest Ref Range: 4.60 - 6.20 M/uL 4.11 (L)    Hemoglobin Latest Ref Range: 14.0 - 18.0 g/dL 12.6 (L)    Hematocrit Latest Ref Range: 40.0 - 54.0 % 37.2 (L)    MCV Latest Ref Range: 82 - 98 fL 91    MCH Latest Ref Range: 27.0 - 31.0 pg 30.7    MCHC Latest Ref Range: 32.0 - 36.0 % 33.8    RDW Latest Ref Range: 11.5 - 14.5 % 13.2    Platelets Latest Ref Range: 150 - 350 K/uL 210    MPV Latest Ref Range: 9.2 - 12.9 fL 7.2 (L)    Gran% Latest Ref Range: 38.0 - 73.0 % 51.9    Gran # Latest Ref Range: 1.8 - 7.7 K/uL 3.0    Lymph% Latest Ref Range: 18.0 - 48.0 % 36.2    Lymph # Latest Ref Range: 1.0 - 4.8 K/uL 2.1    Mono% Latest Ref Range: 4.0 - 15.0 % 7.8    Mono # Latest Ref Range: 0.3 - 1.0 K/uL 0.5    Eosinophil% Latest Ref Range: 0.0 - 8.0 % 4.0    Eos # Latest Ref Range: 0.0 - 0.5 K/uL 0.2    Basophil% Latest Ref Range: 0.0 - 1.9 % 0.1    Baso # Latest Ref Range: 0.00 - 0.20 K/uL 0.00    Sodium Latest Ref Range: 136 - 145 mmol/L 140    Potassium Latest Ref Range: 3.5 - 5.1 mmol/L 4.0    Chloride Latest Ref Range: 95 - 110 mmol/L 108    CO2 Latest Ref Range: 23 - 29 mmol/L 23    Anion Gap Latest Ref Range: 8 - 16 mmol/L 9    BUN, Bld Latest Ref Range: 8 - 23 mg/dL 27 (H)    Creatinine Latest Ref Range: 0.5 - 1.4 mg/dL 3.1 (H)    eGFR if non African American Latest Ref Range: >60 mL/min/1.73 m^2 18 (A)    eGFR if African American Latest Ref Range: >60 mL/min/1.73 m^2 21 (A)    Glucose Latest Ref Range: 70 - 110 mg/dL 113 (H)    Calcium Latest Ref Range: 8.7 - 10.5 mg/dL 9.1    Phosphorus Latest Ref Range: 2.7 - 4.5 mg/dL 3.3    Alkaline Phosphatase Latest Ref Range:  55 - 135 U/L 87    Total Protein Latest Ref Range: 6.0 - 8.4 g/dL 7.1    Albumin Latest Ref Range: 3.5 - 5.2 g/dL 3.7    Total Bilirubin Latest Ref Range: 0.1 - 1.0 mg/dL 0.4    AST Latest Ref Range: 10 - 40 U/L 23    ALT Latest Ref Range: 10 - 44 U/L 16    PTH Latest Ref Range: 9.0 - 77.0 pg/mL 218.6 (H)    Prot/Creat Ratio, Ur Latest Ref Range: 0.00 - 0.20   2.80 (H)   Protein, Urine Random Latest Ref Range: 0 - 15 mg/dL  637 (H)     Assessment:       1. Vestibular disequilibrium involving right inner ear    2. Hypotension due to drugs    3. Orthostatic hypotension    4. Chronic sinusitis, unspecified location        Plan:         Problem List Items Addressed This Visit        Cardiac    Hypotension due to drugs (Chronic)      Other Visit Diagnoses     Vestibular disequilibrium involving right inner ear    -  Primary    Relevant Medications    diazePAM (VALIUM) 2 MG tablet    Other Relevant Orders    Ambulatory consult to Physical Therapy    Orthostatic hypotension        Chronic sinusitis, unspecified location              Return in about 3 months (around 8/10/2017).        Patient information shared at the time of visit:  I suspect that part of this was the drop in your blood pressure, but it also seems there is some weakness of the inner ear on the right.  We will set you up for physical therapy to help with balance, and try taking 1/2 or one tablet of valium just as needed for balance and vertigo.      Thank you very much for the opportunity to assist in this patient's care.  If you have any questions or concerns, please do not hesitate to contact me at any time.     Sincerely,  Frankie Jones, DO

## 2017-05-10 NOTE — MR AVS SNAPSHOT
[facility] Neurology  [address] [facility] [address] [place] [address]  Phone: [phone]  Fax: [phone]                  Micheal Hunt   5/10/2017 3:00 PM   Office Visit    Description:  Male : 1940   Provider:  Frankie Jones DO   Department:  [place] - Neurology           Reason for Visit     Dizziness           Diagnoses this Visit        Comments    Vestibular disequilibrium involving right inner ear    -  Primary     Hypotension due to drugs         Orthostatic hypotension         Chronic sinusitis, unspecified location                To Do List           Future Appointments        Provider Department Dept Phone    2017 9:00 AM [facility] [phone]    2017 1:00 PM Getachew Dunlap MD [facility] Cardiology [phone]    6/15/2017 9:00 AM [facility] [phone]    2017 8:40 AM [facility] [phone]    2017 8:40 AM Pk Lakhani MD [facility] Family Medicine [phone]      Goals (5 Years of Data)     None      Follow-Up and Disposition     Return in about 3 months (around 8/10/2017).       These Medications        Disp Refills Start End    diazePAM (VALIUM) 2 MG tablet 30 tablet 1 5/10/2017 2017    Take 1 tablet (2 mg total) by mouth every 8 (eight) hours as needed (vertigo). - Oral    Pharmacy: [facility]/pharmacy #[id number] - [place], [place] - [address] AT [facility] Ph #: [phone]         [facility]     [facility] Nurse Care Line - 24/ Assistance  Unless otherwise directed by your provider, please contact [facility], our nurse care line that is available for / assistance.     Registered nurses in the [facility] provide: appointment scheduling, clinical advisement, health education, and other advisory services.  Call: [phone] (toll free)                Medications           Message regarding Medications     Verify the changes and/or additions to your medication regime listed below are the same as discussed with your clinician today.  If any of these changes or additions are incorrect, please notify your healthcare provider.        START taking these NEW medications        Refills    diazePAM (VALIUM) 2 MG tablet 1    Sig: Take 1 tablet (2 mg total) by mouth every 8 (eight) hours as needed (vertigo).    Class: Normal    Route: Oral      STOP taking these medications     meclizine (ANTIVERT) 25 mg tablet Take 1 tablet by mouth daily as needed.           Verify that the below list of medications is an accurate representation of the medications you are currently taking.  If none reported, the list may be blank. If incorrect, please contact your healthcare provider. Carry this list with you in case of emergency.           Current Medications     albuterol 90 mcg/actuation inhaler Inhale 2 puffs into the lungs every 6 (six) hours as needed for Wheezing.    albuterol-ipratropium 2.5mg-0.5mg/3mL (DUO-NEB) 0.5 mg-3 mg(2.5 mg base)/3 mL nebulizer solution Take 3 mLs by nebulization every 6 (six) hours as needed for Wheezing. Rescue    allopurinol (ZYLOPRIM) 300 MG tablet Take 1 tablet (300 mg total) by mouth once daily.    amlodipine (NORVASC) 10 MG tablet Take 1 tablet (10 mg total) by mouth before dinner.    aspirin (ECOTRIN) 81 MG EC tablet Take 81 mg by mouth once daily.      atorvastatin (LIPITOR) 80 MG tablet Take 1 tablet (80 mg total) by mouth once daily.    azelastine (ASTELIN) 137 mcg (0.1 %) nasal spray USE 2 SPRAYS TWICE DAILY IN EACH NOSTRIL    benzonatate (TESSALON) 200 MG capsule Take 1 capsule (200 mg total) by mouth 3 (three) times daily as needed for Cough.    calcium-vitamin D 500-125 mg-unit tablet Take 1 tablet by mouth as needed.     carvedilol (COREG) 6.25 MG tablet Take 1 tablet (6.25 mg total) by mouth 2 (two) times daily with meals.    colchicine  "0.6 mg tablet Take 1 tablet (0.6 mg total) by mouth 2 (two) times daily.    cyanocobalamin, vitamin B-12, (VITAMIN B-12) 1,000 mcg Subl Take 1 tablet by mouth daily as needed. Takes 3,000mcg    finasteride (PROSCAR) 5 mg tablet TAKE 1 TABLET ONCE DAILY.    fluticasone (FLONASE) 50 mcg/actuation nasal spray 1 spray by Each Nare route once daily.    inhalation device (AEROCHAMBER PLUS FLOW-VU) Use as directed for inhalation.    predniSONE (DELTASONE) 10 MG tablet Take 10 mg by mouth 2 (two) times daily.    PREVNAR 13, PF, 0.5 mL Syrg TO BE ADMINISTERED BY PHARMACIST FOR IMMUNIZATION    terazosin (HYTRIN) 5 MG capsule Take 1 capsule (5 mg total) by mouth every evening.    tramadol (ULTRAM) 50 mg tablet Take 1 tablet (50 mg total) by mouth every 6 (six) hours as needed.    urea (CARMOL) 40 % Crea Apply topically once daily.    valsartan (DIOVAN) 320 MG tablet Take 1 tablet (320 mg total) by mouth once daily.    diazePAM (VALIUM) 2 MG tablet Take 1 tablet (2 mg total) by mouth every 8 (eight) hours as needed (vertigo).    nitroGLYCERIN (NITROSTAT) 0.4 MG SL tablet Place 1 tablet (0.4 mg total) under the tongue every 5 (five) minutes as needed for Chest pain.           Clinical Reference Information           Your Vitals Were     BP Pulse Resp Height Weight BMI    156/82 (BP Location: Left arm, Patient Position: Sitting, BP Method: Automatic) 66 20 6' 1" (1.854 m) 115.1 kg (253 lb 10.2 oz) 33.46 kg/m2      Blood Pressure          Most Recent Value    BP  (!)  156/82      Allergies as of 5/10/2017     Eplerenone      Immunizations Administered on Date of Encounter - 5/10/2017     None      Orders Placed During Today's Visit      Normal Orders This Visit    Ambulatory consult to Physical Therapy       Instructions    I suspect that part of this was the drop in your blood pressure, but it also seems there is some weakness of the inner ear on the right.  We will set you up for physical therapy to help with balance, and try " taking 1/2 or one tablet of valium just as needed for balance and vertigo.         Language Assistance Services     ATTENTION: Language assistance services are available, free of charge. Please call [phone].      ATENCIÓN: Si regan pablo, tiene a nolan disposición servicios gratuitos de asistencia lingüística. Llame al [phone].     [facility] Ý: N?u b?n nói Ti?ng Vi?t, có các d?ch v? h? tr? ngôn ng? mi?n phí dành cho b?n. G?i s? [phone].         [facility] complies with applicable Federal civil rights laws and does not discriminate on the basis of race, color, national origin, age, disability, or sex.

## 2017-05-10 NOTE — LETTER
May 12, 2017      Pk Lakhani MD  [address] [facility] [place] [address]           [facility]  [address] [facility] [address] [place] [address]  Phone: [phone]  Fax: [phone]          Patient: Micheal Hunt   MR Number: [id number]   YOB: 1940   Date of Visit: 5/10/2017       Dear Dr. Pk Lakhani:    Thank you for referring Micheal Hunt to me for evaluation. Attached you will find relevant portions of my assessment and plan of care.    If you have questions, please do not hesitate to call me. I look forward to following Micheal Hunt along with you.    Sincerely,    Frankie Jones, DO    Enclosure  CC:  No Recipients    If you would like to receive this communication electronically, please contact [email or web address] or [phone] to request more information on [employer] access.    For providers and/or their staff who would like to refer a patient to [facility], please contact us through our one-stop-shop provider referral line, [facility], at [phone].    If you feel you have received this communication in error or would no longer like to receive these types of communications, please e-mail [email or web address]

## 2017-05-12 ENCOUNTER — TELEPHONE (OUTPATIENT)
Dept: ENDOSCOPY | Facility: HOSPITAL | Age: 77
End: 2017-05-12

## 2017-05-15 ENCOUNTER — TELEPHONE (OUTPATIENT)
Dept: ENDOSCOPY | Facility: HOSPITAL | Age: 77
End: 2017-05-15

## 2017-05-15 NOTE — TELEPHONE ENCOUNTER
Contacted patient regarding EUS scheduled 5/31/17 at 0800. Reviewed patient's medical history, allergies, and medications. Verbally reviewed prep instructions with patient. Patient verbalized understanding. Prep instructions mailed to patient. Patient has no further questions at this time.

## 2017-05-19 DIAGNOSIS — I10 ESSENTIAL HYPERTENSION: ICD-10-CM

## 2017-05-19 DIAGNOSIS — I51.9 LV DYSFUNCTION: ICD-10-CM

## 2017-05-19 RX ORDER — CARVEDILOL 6.25 MG/1
6.25 TABLET ORAL 2 TIMES DAILY WITH MEALS
Qty: 90 TABLET | Refills: 3 | Status: SHIPPED | OUTPATIENT
Start: 2017-05-19 | End: 2017-11-12 | Stop reason: SDUPTHER

## 2017-05-27 ENCOUNTER — OFFICE VISIT (OUTPATIENT)
Dept: FAMILY MEDICINE | Facility: CLINIC | Age: 77
End: 2017-05-27
Payer: MEDICARE

## 2017-05-27 VITALS
TEMPERATURE: 98 F | WEIGHT: 246.25 LBS | HEIGHT: 73 IN | OXYGEN SATURATION: 97 % | BODY MASS INDEX: 32.64 KG/M2 | HEART RATE: 48 BPM | SYSTOLIC BLOOD PRESSURE: 114 MMHG | RESPIRATION RATE: 20 BRPM | DIASTOLIC BLOOD PRESSURE: 66 MMHG

## 2017-05-27 DIAGNOSIS — M10.379 ACUTE GOUT DUE TO RENAL IMPAIRMENT INVOLVING FOOT, UNSPECIFIED LATERALITY: Primary | ICD-10-CM

## 2017-05-27 DIAGNOSIS — N18.4 CKD (CHRONIC KIDNEY DISEASE) STAGE 4, GFR 15-29 ML/MIN: ICD-10-CM

## 2017-05-27 PROCEDURE — 99213 OFFICE O/P EST LOW 20 MIN: CPT | Mod: S$PBB,,, | Performed by: FAMILY MEDICINE

## 2017-05-27 PROCEDURE — 99213 OFFICE O/P EST LOW 20 MIN: CPT | Mod: PBBFAC,PO | Performed by: FAMILY MEDICINE

## 2017-05-27 PROCEDURE — 96372 THER/PROPH/DIAG INJ SC/IM: CPT | Mod: PBBFAC,PO

## 2017-05-27 PROCEDURE — 99999 PR PBB SHADOW E&M-EST. PATIENT-LVL III: CPT | Mod: PBBFAC,,, | Performed by: FAMILY MEDICINE

## 2017-05-27 RX ORDER — TRIAMCINOLONE ACETONIDE 40 MG/ML
40 INJECTION, SUSPENSION INTRA-ARTICULAR; INTRAMUSCULAR
Status: COMPLETED | OUTPATIENT
Start: 2017-05-27 | End: 2017-05-27

## 2017-05-27 RX ORDER — MECLIZINE HYDROCHLORIDE 25 MG/1
TABLET ORAL
COMMUNITY
Start: 2017-05-26 | End: 2017-08-18 | Stop reason: SDUPTHER

## 2017-05-27 RX ADMIN — TRIAMCINOLONE ACETONIDE 40 MG: 40 INJECTION, SUSPENSION INTRA-ARTICULAR; INTRAMUSCULAR at 10:05

## 2017-05-27 NOTE — PROGRESS NOTES
Subjective:       Patient ID: Micheal Hunt is a 76 y.o. male.    Chief Complaint: Gout (left foot)    76 year old male with history of gout and stage 4 chronic kidney disease has been on allopurinol 300mg daily.  About 5-6 days ago he developed pain in the right great toe and started taking colchicine per his routine.  The right improved but he then developed pain in the left great toe and foot and that persists in spite of continuing the colchicine.  He cannot take indomethacin due to renal insufficiency.  He reports he has been eating fried fish and had some sausage recently but no seafood otherwise, no shellfish.    Past Medical History:  No date: Allergy  No date: Arthritis      Comment: Gout  No date: BPH (benign prostatic hyperplasia)  2006: CAD (coronary artery disease)  No date: Chronic kidney disease      Comment: due to ibuprofen  No date: Colon polyp  No date: Diverticulosis  No date: GERD (gastroesophageal reflux disease)  3/27/2012: HTN (hypertension)  No date: Hyperlipidemia  No date: LVH (left ventricular hypertrophy)  3/27/2012: Murmur, cardiac  No date: GRICELDA (obstructive sleep apnea)      Comment: DOES NOT USE A MACHINE  No date: Sinus problem  No date: Syncope and collapse    Past Surgical History:  No date: APPENDECTOMY  No date: CARDIAC CATHETERIZATION  2011: COLONOSCOPY  4/2007: CORONARY ARTERY BYPASS GRAFT      Comment: x 1  No date: JOINT REPLACEMENT      Comment: left knee total replacement  X 3  No date: mid leftt finger      Comment: from [facility]ara cardoza  2016: MOLE REMOVAL  No date: rotative cuff      Comment: no rotative cuffs on bilat shoulders has pins   No date: SHOULDER SURGERY      Comment: shoulder surgery bilat  RIGHT X 4; LEFT X 3      Current Outpatient Prescriptions:     albuterol-ipratropium 2.5mg-0.5mg/3mL (DUO-NEB) 0.5 mg-3 mg(2.5 mg base)/3 mL nebulizer solution, Take 3 mLs by nebulization every 6 (six) hours as needed for Wheezing. Rescue, Disp: 30 vial, Rfl: 0     allopurinol (ZYLOPRIM) 300 MG tablet, Take 1 tablet (300 mg total) by mouth once daily., Disp: 90 tablet, Rfl: 3    amlodipine (NORVASC) 10 MG tablet, Take 1 tablet (10 mg total) by mouth before dinner., Disp: 90 tablet, Rfl: 3    aspirin (ECOTRIN) 81 MG EC tablet, Take 81 mg by mouth once daily.  , Disp: , Rfl:     atorvastatin (LIPITOR) 80 MG tablet, Take 1 tablet (80 mg total) by mouth once daily., Disp: 90 tablet, Rfl: 3    azelastine (ASTELIN) 137 mcg (0.1 %) nasal spray, USE 2 SPRAYS TWICE DAILY IN EACH NOSTRIL, Disp: , Rfl: 5    calcium-vitamin D 500-125 mg-unit tablet, Take 1 tablet by mouth as needed. , Disp: , Rfl:     carvedilol (COREG) 6.25 MG tablet, Take 1 tablet (6.25 mg total) by mouth 2 (two) times daily with meals., Disp: 90 tablet, Rfl: 3    colchicine 0.6 mg tablet, Take 1 tablet (0.6 mg total) by mouth 2 (two) times daily., Disp: 10 tablet, Rfl: 3    cyanocobalamin, vitamin B-12, (VITAMIN B-12) 1,000 mcg Subl, Take 1 tablet by mouth daily as needed. Takes 3,000mcg, Disp: , Rfl:     finasteride (PROSCAR) 5 mg tablet, TAKE 1 TABLET ONCE DAILY., Disp: 90 tablet, Rfl: 3    fluticasone (FLONASE) 50 mcg/actuation nasal spray, 1 spray by Each Nare route once daily., Disp: 1 Bottle, Rfl: 0    meclizine (ANTIVERT) 25 mg tablet, , Disp: , Rfl:     terazosin (HYTRIN) 5 MG capsule, Take 1 capsule (5 mg total) by mouth every evening., Disp: 90 capsule, Rfl: 0    tramadol (ULTRAM) 50 mg tablet, Take 1 tablet (50 mg total) by mouth every 6 (six) hours as needed., Disp: 120 tablet, Rfl: 4    valsartan (DIOVAN) 320 MG tablet, Take 1 tablet (320 mg total) by mouth once daily., Disp: 90 tablet, Rfl: 3    albuterol 90 mcg/actuation inhaler, Inhale 2 puffs into the lungs every 6 (six) hours as needed for Wheezing., Disp: 1 each, Rfl: 11    inhalation device (AEROCHAMBER PLUS FLOW-VU), Use as directed for inhalation., Disp: 1 Device, Rfl: 0    nitroGLYCERIN (NITROSTAT) 0.4 MG SL tablet, Place 1  tablet (0.4 mg total) under the tongue every 5 (five) minutes as needed for Chest pain., Disp: 25 tablet, Rfl: 4          Review of Systems   Musculoskeletal: Positive for arthralgias, gait problem and joint swelling.       Objective:      Physical Exam   Constitutional:   Good blood pressure control     Musculoskeletal:        Left foot: There is tenderness, bony tenderness and swelling.        Feet:    Skin: Skin is warm and dry. No rash noted. No erythema.   Nursing note and vitals reviewed.      Assessment:       1. Acute gout due to renal impairment involving foot, unspecified laterality    2. CKD (chronic kidney disease) stage 4, GFR 15-29 ml/min        Plan:       1. Acute gout due to renal impairment involving foot, unspecified laterality  - triamcinolone acetonide injection 40 mg; Inject 1 mL (40 mg total) into the muscle one time.    2. CKD (chronic kidney disease) stage 4, GFR 15-29 ml/min  No nsaids/indomethacin allowed

## 2017-05-27 NOTE — PROGRESS NOTES
Administered 40 mg Kenalog IM. Patient tolerated well. No bleeding at insertion site noted. Pain scale 0/10 with injection. 2 patient identifiers used (name, ), aseptic technique maintained.

## 2017-05-31 ENCOUNTER — HOSPITAL ENCOUNTER (OUTPATIENT)
Facility: HOSPITAL | Age: 77
Discharge: HOME | End: 2017-05-31
Attending: INTERNAL MEDICINE | Admitting: INTERNAL MEDICINE
Payer: MEDICARE

## 2017-05-31 ENCOUNTER — ANESTHESIA EVENT (OUTPATIENT)
Dept: ENDOSCOPY | Facility: HOSPITAL | Age: 77
End: 2017-05-31
Payer: MEDICARE

## 2017-05-31 ENCOUNTER — ANESTHESIA (OUTPATIENT)
Dept: ENDOSCOPY | Facility: HOSPITAL | Age: 77
End: 2017-05-31
Payer: MEDICARE

## 2017-05-31 ENCOUNTER — TELEPHONE (OUTPATIENT)
Dept: CARDIOLOGY | Facility: CLINIC | Age: 77
End: 2017-05-31

## 2017-05-31 VITALS
HEART RATE: 56 BPM | SYSTOLIC BLOOD PRESSURE: 168 MMHG | BODY MASS INDEX: 31.81 KG/M2 | TEMPERATURE: 98 F | OXYGEN SATURATION: 99 % | RESPIRATION RATE: 14 BRPM | WEIGHT: 240 LBS | DIASTOLIC BLOOD PRESSURE: 70 MMHG | HEIGHT: 73 IN

## 2017-05-31 DIAGNOSIS — K31.89 GASTRIC NODULE: Primary | ICD-10-CM

## 2017-05-31 PROCEDURE — 43237 ENDOSCOPIC US EXAM ESOPH: CPT | Mod: ,,, | Performed by: INTERNAL MEDICINE

## 2017-05-31 PROCEDURE — D9220A PRA ANESTHESIA: Mod: CRNA,,, | Performed by: NURSE ANESTHETIST, CERTIFIED REGISTERED

## 2017-05-31 PROCEDURE — 27201089 HC SNARE, DISP (ANY): Performed by: INTERNAL MEDICINE

## 2017-05-31 PROCEDURE — 43254 EGD ENDO MUCOSAL RESECTION: CPT | Performed by: INTERNAL MEDICINE

## 2017-05-31 PROCEDURE — 37000009 HC ANESTHESIA EA ADD 15 MINS: Performed by: INTERNAL MEDICINE

## 2017-05-31 PROCEDURE — 43237 ENDOSCOPIC US EXAM ESOPH: CPT | Performed by: INTERNAL MEDICINE

## 2017-05-31 PROCEDURE — 25000003 PHARM REV CODE 250: Performed by: NURSE ANESTHETIST, CERTIFIED REGISTERED

## 2017-05-31 PROCEDURE — 25000003 PHARM REV CODE 250: Performed by: INTERNAL MEDICINE

## 2017-05-31 PROCEDURE — D9220A PRA ANESTHESIA: Mod: ANES,,, | Performed by: ANESTHESIOLOGY

## 2017-05-31 PROCEDURE — 43254 EGD ENDO MUCOSAL RESECTION: CPT | Mod: ,,, | Performed by: INTERNAL MEDICINE

## 2017-05-31 PROCEDURE — 37000008 HC ANESTHESIA 1ST 15 MINUTES: Performed by: INTERNAL MEDICINE

## 2017-05-31 PROCEDURE — 88305 TISSUE EXAM BY PATHOLOGIST: CPT | Mod: 26,,, | Performed by: PATHOLOGY

## 2017-05-31 PROCEDURE — 63600175 PHARM REV CODE 636 W HCPCS: Performed by: NURSE ANESTHETIST, CERTIFIED REGISTERED

## 2017-05-31 PROCEDURE — 27201240 HC KIT, EMR: Performed by: INTERNAL MEDICINE

## 2017-05-31 PROCEDURE — 88305 TISSUE EXAM BY PATHOLOGIST: CPT | Performed by: PATHOLOGY

## 2017-05-31 RX ORDER — FENTANYL CITRATE 50 UG/ML
INJECTION, SOLUTION INTRAMUSCULAR; INTRAVENOUS
Status: DISCONTINUED | OUTPATIENT
Start: 2017-05-31 | End: 2017-05-31

## 2017-05-31 RX ORDER — MIDAZOLAM HYDROCHLORIDE 1 MG/ML
INJECTION, SOLUTION INTRAMUSCULAR; INTRAVENOUS
Status: DISCONTINUED | OUTPATIENT
Start: 2017-05-31 | End: 2017-05-31

## 2017-05-31 RX ORDER — OMEPRAZOLE 40 MG/1
40 CAPSULE, DELAYED RELEASE ORAL
Qty: 90 CAPSULE | Refills: 0 | Status: SHIPPED | OUTPATIENT
Start: 2017-05-31 | End: 2017-08-18

## 2017-05-31 RX ORDER — PROPOFOL 10 MG/ML
VIAL (ML) INTRAVENOUS
Status: DISCONTINUED | OUTPATIENT
Start: 2017-05-31 | End: 2017-05-31

## 2017-05-31 RX ORDER — PROPOFOL 10 MG/ML
VIAL (ML) INTRAVENOUS CONTINUOUS PRN
Status: DISCONTINUED | OUTPATIENT
Start: 2017-05-31 | End: 2017-05-31

## 2017-05-31 RX ORDER — SODIUM CHLORIDE 9 MG/ML
INJECTION, SOLUTION INTRAVENOUS CONTINUOUS
Status: DISCONTINUED | OUTPATIENT
Start: 2017-05-31 | End: 2017-05-31 | Stop reason: HOSPADM

## 2017-05-31 RX ORDER — LIDOCAINE HCL/PF 100 MG/5ML
SYRINGE (ML) INTRAVENOUS
Status: DISCONTINUED | OUTPATIENT
Start: 2017-05-31 | End: 2017-05-31

## 2017-05-31 RX ADMIN — LIDOCAINE HYDROCHLORIDE 100 MG: 20 INJECTION, SOLUTION INTRAVENOUS at 08:05

## 2017-05-31 RX ADMIN — FENTANYL CITRATE 50 MCG: 50 INJECTION, SOLUTION INTRAMUSCULAR; INTRAVENOUS at 08:05

## 2017-05-31 RX ADMIN — FENTANYL CITRATE 25 MCG: 50 INJECTION, SOLUTION INTRAMUSCULAR; INTRAVENOUS at 08:05

## 2017-05-31 RX ADMIN — MIDAZOLAM HYDROCHLORIDE 2 MG: 1 INJECTION, SOLUTION INTRAMUSCULAR; INTRAVENOUS at 08:05

## 2017-05-31 RX ADMIN — PROPOFOL 100 MCG/KG/MIN: 10 INJECTION, EMULSION INTRAVENOUS at 08:05

## 2017-05-31 RX ADMIN — BENZOCAINE 1 EACH: 220 SPRAY, METERED PERIODONTAL at 08:05

## 2017-05-31 RX ADMIN — PROPOFOL 50 MG: 10 INJECTION, EMULSION INTRAVENOUS at 08:05

## 2017-05-31 RX ADMIN — SODIUM CHLORIDE: 0.9 INJECTION, SOLUTION INTRAVENOUS at 08:05

## 2017-05-31 NOTE — ANESTHESIA RELEASE NOTE
"Anesthesia Release from PACU Note    Patient: Micheal Hunt    Procedure(s) Performed: Procedure(s) (LRB):  ULTRASOUND-ENDOSCOPIC-UPPER (N/A)    Anesthesia type: general    Post pain: Adequate analgesia    Post assessment: no apparent anesthetic complications and tolerated procedure well    Last Vitals:   Visit Vitals  BP (!) 151/77   Pulse (!) 59   Temp 36.7 °C (98.1 °F) (Skin)   Resp (!) 21   Ht 6' 1" (1.854 m)   Wt 108.9 kg (240 lb)   SpO2 98%   BMI 31.66 kg/m²       Post vital signs: stable    Level of consciousness: awake, alert  and oriented    Nausea/Vomiting: no nausea/no vomiting    Complications: none    Airway Patency: patent    Respiratory: unassisted, spontaneous ventilation, room air    Cardiovascular: stable and blood pressure at baseline    Hydration: euvolemic  "

## 2017-05-31 NOTE — ANESTHESIA POSTPROCEDURE EVALUATION
"Anesthesia Post Evaluation    Patient: Micheal Hunt    Procedure(s) Performed: Procedure(s) (LRB):  ULTRASOUND-ENDOSCOPIC-UPPER (N/A)    Final Anesthesia Type: general  Patient location during evaluation: [facility]  Patient participation: Yes- Able to Participate  Level of consciousness: awake and alert and oriented  Post-procedure vital signs: reviewed and stable  Pain management: adequate  Airway patency: patent  PONV status at discharge: No PONV  Anesthetic complications: no      Cardiovascular status: blood pressure returned to baseline and stable  Respiratory status: unassisted, spontaneous ventilation and room air  Hydration status: euvolemic  Follow-up not needed.        Visit Vitals  BP (!) 151/77   Pulse (!) 59   Temp 36.7 °C (98.1 °F) (Skin)   Resp (!) 21   Ht 6' 1" (1.854 m)   Wt 108.9 kg (240 lb)   SpO2 98%   BMI 31.66 kg/m²       Pain/Sonny Score: Pain Assessment Performed: Yes (5/31/2017  9:05 AM)  Presence of Pain: denies (5/31/2017  9:05 AM)  Sonny Score: 8 (5/31/2017  9:05 AM)      "

## 2017-05-31 NOTE — PATIENT INSTRUCTIONS
Discharge Summary/Instructions for after an Upper EUS  Patient Name: Micheal Hunt  Patient MRN: [id number]  Patient YOB: 1940  Wednesday, May 31, 2017  Tra Brooks MD  1.  Do Not eat or drink anything for 1 hour.  Try sips of water first.  If   tolerated, resume your regular diet or one recommended by your physician.  2.  Do not drive, operate machinery, make critical decisions, or do   activities that require coordination or balance for 24 hours.  3.  You may experience a sore throat for 24 to 48 hours.  You may use throat   lozenges or gargle with warm salt water to relieve the discomfort.  4.  Because air was put into your stomach during the procedure, you may   experience some belching.  5.  Go directly to the emergency room if you notice any of the following:   Chills and/or fever over 101   Persistent vomiting or vomiting with blood   Severe abdominal pain, other than gas cramps   Severe chest pain   Black, tarry stools  Your doctor recommends these additional instructions:  You are being discharged to home.   Do not take any aspirin, ibuprofen (including Advil, Motrin or Nuprin),   naproxen (including Aleve), or any other non-steroidal anti-inflammatory   drugs for 2 weeks after your polyp removal.   We are waiting for pathology results.  If you have any questions on the above instructions, call the GI Lab and ask   for an endoscopy nurse.  If you have any problems, please call your physician.  EMERGENCY PHONE NUMBER: [phone]  LAB RESULTS: [phone]  Tra Brooks MD  5/31/2017 9:37:54 AM  This report has been verified and signed electronically.

## 2017-05-31 NOTE — ANESTHESIA PREPROCEDURE EVALUATION
05/31/2017  Micheal Hunt is a 76 y.o., male.    Pre-op Assessment    I have reviewed the Patient Summary Reports.     I have reviewed the Nursing Notes.   I have reviewed the Medications.     Review of Systems  Anesthesia Hx:  No problems with previous Anesthesia   Denies Personal Hx of Anesthesia complications.   Cardiovascular:   Hypertension CAD   2D echo 2017:  CONCLUSIONS     1 - Mildly enlarged aortic root.     2 - Biatrial enlargement.     3 - Concentric hypertrophy.     4 - No wall motion abnormalities.     5 - Normal left ventricular systolic function (EF 55-60%).     6 - Mild aortic regurgitation.     7 - Mild mitral regurgitation.     8 - Indeterminate LV diastolic function.     9 - Normal right ventricular systolic function .     10 - The estimated PA systolic pressure is greater than 25 mmHg.     11 - Suggest some improvement in LVEF from Echo in 3/2016.    Pulmonary:   Sleep Apnea    Renal/:   Chronic Renal Disease, CRI  Kidney Function/Disease, Chronic Kidney Disease (CKD) , CKD Stage III (GFR 30-59)    Hepatic/GI:  Esophageal / Stomach Disorders Gerd, Gastric Conditions:    Musculoskeletal:   Arthritis     Endocrine:  Metabolic Disorders, Hyperlipoproteinemia      Physical Exam  General:  Well nourished    Airway/Jaw/Neck:  Airway Findings: Mouth Opening: Normal General Airway Assessment: Adult  Mallampati: II            Mental Status:  Mental Status Findings:  Cooperative, Alert and Oriented         Anesthesia Plan  Type of Anesthesia, risks & benefits discussed:  Anesthesia Type:  general  Patient's Preference: GA  Intra-op Monitoring Plan: standard ASA monitors  Intra-op Monitoring Plan Comments:   Post Op Pain Control Plan:   Post Op Pain Control Plan Comments:   Induction:    Beta Blocker:  Patient is not currently on a Beta-Blocker (No further documentation required).        Informed Consent: Patient understands risks and agrees with Anesthesia plan.  Questions answered. Anesthesia consent signed with patient.  ASA Score: 3     Day of Surgery Review of History & Physical:    H&P update referred to the provider.         Ready For Surgery From Anesthesia Perspective.

## 2017-05-31 NOTE — TRANSFER OF CARE
"Anesthesia Transfer of Care Note    Patient: Micheal Hunt    Procedure(s) Performed: Procedure(s) (LRB):  ULTRASOUND-ENDOSCOPIC-UPPER (N/A)    Patient location: PACU    Anesthesia Type: general    Transport from OR: Transported from OR on 2-3 L/min O2 by NC with adequate spontaneous ventilation    Post pain: adequate analgesia    Post assessment: no apparent anesthetic complications and tolerated procedure well    Post vital signs: stable    Level of consciousness: awake, alert and oriented    Nausea/Vomiting: no nausea/vomiting    Complications: none    Transfer of care protocol was followed      Last vitals:   Visit Vitals  BP (!) 173/88 (BP Location: Left arm, Patient Position: Lying, BP Method: Automatic)   Pulse 60   Temp 36.6 °C (97.8 °F) (Oral)   Resp 18   Ht 6' 1" (1.854 m)   Wt 108.9 kg (240 lb)   SpO2 99%   BMI 31.66 kg/m²     "

## 2017-05-31 NOTE — DISCHARGE INSTRUCTIONS
Endoscopic Ultrasound (EUS)    An endoscopic ultrasound (EUS) is a test to look at the inside of your gastrointestinal (GI) tract. It's commonly used to look for cancers or growths in the esophagus, stomach, pancreas, liver, and rectum. It can help to stage cancer (see how advanced a cancer is). EUS may also be used to help diagnose certain diseases or to drain cysts or abscesses.  What is EUS?  EUS shows both ultrasound images and live video of the GI tract. During the test, a flexible tube called an endoscope (scope) is used. At the end of the scope is a tiny video camera and light. The video camera sends live images to a monitor. The scope also contains a very small ultrasound device. This uses sound waves to create images and send them to a monitor.  A needle is passed through the scope. The needle can be used take a small sample of tissue for testing. This is called a biopsy. The needle can be used to take a sample of fluid. This is called fine-needle aspiration (FNA).  Risks and possible complications of EUS  Risks and possible complications include the following:  · Bleeding  · Infection  · A perforation (hole) in the digestive tract   · Risks of sedation or anesthesia   Before the test  Be prepared prior to the test:  · Tell your healthcare provider what medicine you take. This includes vitamins, herbs, and over-the-counter medicine. It also includes any blood thinners, such as warfarin, clopidogrel, ibuprofen, or daily aspirin. Ask your healthcare provider if you need to stop taking some or all of them before the test.  · You may be prescribed antibiotics to take before or after the test. This depends on the area being studied and what is done during the test. These medicines help prevent infection.  · Carefully follow the instructions for preparing for the test to make sure results are accurate. Instructions may include:  ¨ If youre having an EUS of the upper GI tract (esophagus, stomach, duodenum,  pancreas, liver):  § Do not eat or drink for 6 hours before the test.  ¨ If youre having an EUS of the lower GI tract (rectum):  § Before the test, do bowel prep as instructed to clean your rectum of stool. This may involve a clear liquid diet and using a laxative (liquid or pills) the night before the test. Or it may mean doing one or more enemas the morning of the test.  § Do not eat or drink for 6 hours before the test.  · Be sure to arrive on time at the facility. Bring your identification and health insurance card. Leave valuables at home. If you have them, bring X-rays or other test results with you.  Let the healthcare provider know  For your safety, tell the healthcare provider if you:  · Take insulin. Your dose may need to be changed on the day of your test.  · Are allergic to latex.  · Have any other allergies.  · Are taking blood thinners.   During the test  An endoscopic ultrasound usually takes place in a hospital. The procedure itself may take 1 to 2 hours. You will likely go home soon afterward. During the test:  · You lie on your left side on an exam table.  · An intravenous (IV) line will be put into a vein in your arm or hand. This line supplies fluids and medicines. To keep you comfortable during the test, you will be given a sedative medicine. This medicine prevents discomfort and will make you sleepy.  · If you are having an EUS of the upper GI tract, local anesthetic may be sprayed in your throat. This will help you be more comfortable as the healthcare provider inserts the scope. The healthcare provider then gently puts the flexible scope into your mouth or nose and down your throat.  · If youre having an EUS of the lower GI tract, the healthcare provider gently puts the flexible scope into your anus.  · During the test, the scope sends live video and ultrasound images from inside your body to nearby monitors. These are used to examine your GI tract. Specialized procedures, such as drainage,  are done as needed.  · The healthcare provider may discuss the results with you soon after the test. Biopsy results take several  days.  · In most cases, you can go home within a few hours of the test. When you leave the facility, have an adult family member or friend drive you, even if you don't feel that sleepy.  After the test  Here is what to expect after the test:  · You may feel tired from the sedative. This should wear off by the end of the day.  · If you had an upper digestive endoscopy, your throat may feel sore for a day or two. Over-the-counter sore throat lozenges and spray should help.  · You can eat and drink normally as soon as the test is done.  When to call the healthcare provider  Call your healthcare provider if you notice any of the following:  · Fever of 100.4°F (38.0°C) or higher, or as advised by your healthcare provider  · Shortness of breath  · Vomiting blood, blood in stool, or black stools  · Coughing or hoarse voice that wont go away   Date Last Reviewed: 7/1/2016  © 1191-9743 [employer]. [address] [place] [address]. All rights reserved. This information is not intended as a substitute for professional medical care. Always follow your healthcare professional's instructions.

## 2017-05-31 NOTE — H&P
Short Stay Endoscopy History and Physical    PCP - Pk Lakhani MD  Referring Physician - Messi Harris MD  [address] [facility]  [address]  [place] [id number]    Procedure - EUS  ASA - per anesthesia  Mallampati - per anesthesia  History of Anesthesia problems - no  Family history Anesthesia problems -  no   Plan of anesthesia - General    HPI:  This is a 76 y.o. male here for evaluation of: gastric nodule    Reflux - no  Dysphagia - no  Abdominal pain - no  Diarrhea - no    ROS:  Constitutional: No fevers, chills, No weight loss  CV: No chest pain  Pulm: No cough, No shortness of breath  Ophtho: No vision changes  GI: see HPI  Derm: No rash    Medical History:  has a past medical history of Allergy; Arthritis; BPH (benign prostatic hyperplasia); CAD (coronary artery disease) (2006); Chronic kidney disease; Colon polyp; Diverticulosis; GERD (gastroesophageal reflux disease); HTN (hypertension) (3/27/2012); Hyperlipidemia; LVH (left ventricular hypertrophy); Murmur, cardiac (3/27/2012); GRICELDA (obstructive sleep apnea); Sinus problem; and Syncope and collapse.    Surgical History:  has a past surgical history that includes Shoulder surgery; rotative cuff; mid leftt finger; Appendectomy; Cardiac catheterization; Coronary artery bypass graft (4/2007); Colonoscopy (2011); Joint replacement; and Mole removal (2016).    Family History: family history includes Heart disease in his mother and sister; Stroke in his sister; Sudden death in his father..    Social History:  reports that he has never smoked. He has never used smokeless tobacco. He reports that he drinks alcohol. He reports that he does not use drugs.    Review of patient's allergies indicates:   Allergen Reactions    Eplerenone Other (See Comments)     Marked bradycardia, 39, tiredness and weakness         Medications:   Prescriptions Prior to Admission   Medication Sig Dispense Refill Last Dose    albuterol-ipratropium 2.5mg-0.5mg/3mL (DUO-NEB) 0.5 mg-3  mg(2.5 mg base)/3 mL nebulizer solution Take 3 mLs by nebulization every 6 (six) hours as needed for Wheezing. Rescue 30 vial 0 Past Month at Unknown time    allopurinol (ZYLOPRIM) 300 MG tablet Take 1 tablet (300 mg total) by mouth once daily. 90 tablet 3 5/30/2017 at Unknown time    amlodipine (NORVASC) 10 MG tablet Take 1 tablet (10 mg total) by mouth before dinner. 90 tablet 3 5/30/2017 at Unknown time    aspirin (ECOTRIN) 81 MG EC tablet Take 81 mg by mouth once daily.     Past Week at Unknown time    atorvastatin (LIPITOR) 80 MG tablet Take 1 tablet (80 mg total) by mouth once daily. 90 tablet 3 5/30/2017 at Unknown time    azelastine (ASTELIN) 137 mcg (0.1 %) nasal spray USE 2 SPRAYS TWICE DAILY IN EACH NOSTRIL  5 Past Week at Unknown time    calcium-vitamin D 500-125 mg-unit tablet Take 1 tablet by mouth as needed.    Past Week at Unknown time    carvedilol (COREG) 6.25 MG tablet Take 1 tablet (6.25 mg total) by mouth 2 (two) times daily with meals. 90 tablet 3 5/30/2017 at Unknown time    inhalation device (AEROCHAMBER PLUS FLOW-VU) Use as directed for inhalation. 1 Device 0 Past Month at Unknown time    terazosin (HYTRIN) 5 MG capsule Take 1 capsule (5 mg total) by mouth every evening. 90 capsule 0 5/30/2017 at Unknown time    tramadol (ULTRAM) 50 mg tablet Take 1 tablet (50 mg total) by mouth every 6 (six) hours as needed. 120 tablet 4 Past Month at Unknown time    valsartan (DIOVAN) 320 MG tablet Take 1 tablet (320 mg total) by mouth once daily. 90 tablet 3 5/31/2017 at Unknown time    colchicine 0.6 mg tablet Take 1 tablet (0.6 mg total) by mouth 2 (two) times daily. 10 tablet 3 5/29/2017    cyanocobalamin, vitamin B-12, (VITAMIN B-12) 1,000 mcg Subl Take 1 tablet by mouth daily as needed. Takes 3,000mcg   5/29/2017    finasteride (PROSCAR) 5 mg tablet TAKE 1 TABLET ONCE DAILY. 90 tablet 3 Taking    fluticasone (FLONASE) 50 mcg/actuation nasal spray 1 spray by Each Nare route once daily.  1 Bottle 0 More than a month at Unknown time    meclizine (ANTIVERT) 25 mg tablet    Taking    nitroGLYCERIN (NITROSTAT) 0.4 MG SL tablet Place 1 tablet (0.4 mg total) under the tongue every 5 (five) minutes as needed for Chest pain. 25 tablet 4 Taking       Physical Exam:    Vital Signs:   Vitals:    05/31/17 0755   BP: (!) 173/88   Pulse: 60   Resp: 18   Temp: 97.8 °F (36.6 °C)       General Appearance: Well appearing in no acute distress  Eyes:    No scleral icterus  ENT: Neck supple  Lungs: CTA anteriorly  Heart:  Regular rate  Abdomen: Soft, non tender, non distended with normal bowel sounds.  Extremities: No edema  Skin: No rash    Labs:  Lab Results   Component Value Date    WBC 5.80 04/26/2017    HGB 12.6 (L) 04/26/2017    HCT 37.2 (L) 04/26/2017     04/26/2017    CHOL 116 (L) 03/10/2017    TRIG 121 03/10/2017    HDL 29 (L) 03/10/2017    ALT 16 04/26/2017    AST 23 04/26/2017     04/26/2017    K 4.0 04/26/2017     04/26/2017    CREATININE 3.1 (H) 04/26/2017    BUN 27 (H) 04/26/2017    CO2 23 04/26/2017    TSH 0.787 04/26/2017    PSA 4.01 (H) 06/03/2013    INR 0.97 05/09/2012    HGBA1C 6.2 02/13/2015       I have explained the risks and benefits of this endoscopic procedure to the patient including but not limited to bleeding, inflammation, infection, perforation, and death.      Tra Brooks MD

## 2017-06-06 ENCOUNTER — TELEPHONE (OUTPATIENT)
Dept: GASTROENTEROLOGY | Facility: CLINIC | Age: 77
End: 2017-06-06

## 2017-06-06 NOTE — TELEPHONE ENCOUNTER
----- Message from Tra Brooks MD sent at 6/5/2017  4:13 PM CDT -----  Stefania- Please let pt know that his stomach nodule that was removed (in the body of the stomach) was benign (just some fat tissue). His other nodule that we are following did not change in size, so we should repeat his EUS in 2 yrs (we can now lengthen that interval). Thanks.

## 2017-06-07 ENCOUNTER — OFFICE VISIT (OUTPATIENT)
Dept: CARDIOLOGY | Facility: CLINIC | Age: 77
End: 2017-06-07
Payer: MEDICARE

## 2017-06-07 VITALS
DIASTOLIC BLOOD PRESSURE: 76 MMHG | WEIGHT: 242.75 LBS | HEIGHT: 73 IN | SYSTOLIC BLOOD PRESSURE: 128 MMHG | HEART RATE: 65 BPM | OXYGEN SATURATION: 98 % | BODY MASS INDEX: 32.17 KG/M2

## 2017-06-07 DIAGNOSIS — N18.30 CKD (CHRONIC KIDNEY DISEASE), STAGE III: ICD-10-CM

## 2017-06-07 DIAGNOSIS — E78.00 PURE HYPERCHOLESTEROLEMIA: ICD-10-CM

## 2017-06-07 DIAGNOSIS — I25.10 CORONARY ARTERY DISEASE INVOLVING NATIVE CORONARY ARTERY OF NATIVE HEART WITHOUT ANGINA PECTORIS: ICD-10-CM

## 2017-06-07 DIAGNOSIS — I25.10 CORONARY ARTERY DISEASE INVOLVING NATIVE HEART WITHOUT ANGINA PECTORIS, UNSPECIFIED VESSEL OR LESION TYPE: Primary | ICD-10-CM

## 2017-06-07 DIAGNOSIS — I11.9 LVH (LEFT VENTRICULAR HYPERTROPHY) DUE TO HYPERTENSIVE DISEASE, WITHOUT HEART FAILURE: ICD-10-CM

## 2017-06-07 DIAGNOSIS — E66.9 OBESITY, CLASS I, BMI 30-34.9: Chronic | ICD-10-CM

## 2017-06-07 DIAGNOSIS — I10 ESSENTIAL HYPERTENSION: ICD-10-CM

## 2017-06-07 PROBLEM — I95.2 HYPOTENSION DUE TO DRUGS: Chronic | Status: RESOLVED | Noted: 2017-01-20 | Resolved: 2017-06-07

## 2017-06-07 PROCEDURE — 99214 OFFICE O/P EST MOD 30 MIN: CPT | Mod: S$PBB,,, | Performed by: INTERNAL MEDICINE

## 2017-06-07 PROCEDURE — 1126F AMNT PAIN NOTED NONE PRSNT: CPT | Mod: ,,, | Performed by: INTERNAL MEDICINE

## 2017-06-07 PROCEDURE — 93005 ELECTROCARDIOGRAM TRACING: CPT | Mod: PBBFAC,PO | Performed by: INTERNAL MEDICINE

## 2017-06-07 PROCEDURE — 99214 OFFICE O/P EST MOD 30 MIN: CPT | Mod: PBBFAC,PO,25 | Performed by: INTERNAL MEDICINE

## 2017-06-07 PROCEDURE — 1159F MED LIST DOCD IN RCRD: CPT | Mod: ,,, | Performed by: INTERNAL MEDICINE

## 2017-06-07 PROCEDURE — 93010 ELECTROCARDIOGRAM REPORT: CPT | Mod: S$PBB,,, | Performed by: INTERNAL MEDICINE

## 2017-06-07 PROCEDURE — 99999 PR PBB SHADOW E&M-EST. PATIENT-LVL IV: CPT | Mod: PBBFAC,,, | Performed by: INTERNAL MEDICINE

## 2017-06-07 NOTE — PROGRESS NOTES
Subjective:    Patient ID:  Micheal Hunt is a 76 y.o. male who presents for  of Follow-up  For post CABG, 3 months review, post biopsies of kidney and stomach, HTN  PCP: Dr. Lakhani  Renal: Dr. Rojo  ENT: Dr. Nails  GI: Dr. Harris, Dr. Saleem  Physical medicine: Dr. Whitehead  Urology: Dr. Herring  Orthopedic: Dr. Fox in [place], [place], [phone],  Henri  Lives alone, daughter, Tonya, on the same ranch, 20 acre, 4 horses, 20 chicken, 3  workers, prior commercial contractor, here with grandson, Uziel  Daughter, Crow, nutritionist in [facility] supportive of Mediterranean diet, and a Yoga instruction.      HPI Comments: Portuguese  (Augustine) male, retired cottrell at [employer], here for pre-op evaluation. Significant cardiac history with previous CABG in [place]. Stress test earlier this year was abnormal: Lexiscan -  Nuclear Quantitative Functional Analysis:                                    LVEF: 47 % (normal is 47 - 59)                                               LVED Volume: 195 ml (normal is 91 - 155)                                     LVES Volume: 104 ml (normal is 40 - 78)                                                                                                                   Impression: ABNORMAL MYOCARDIAL PERFUSION                                    1. There is evidence for mild to moderate myocardial ischemia in the         septal wall of the left ventricle, and moderate myocardial ischemia in       the anteroapical wall of the left ventricle with associated stress           induced LV cavity dilatation.                                                2. There is mild intensity fixed defect in the lateral wall of the left      ventricle, consistent with myocardial injury.                                3. There is abnormal wall motion at rest showing severe hypokinesis of       the septal wall of the left ventricle.                                       4. Resting LV  function is normal.  (normal is 47 - 59)                       5. The left ventricular volume is moderately increased at rest.              6. The extracardiac distribution of radioactivity is normal.    Subsequent angiogram, 5/2012:  ANGIOGRAPHIC RESULTS:                                                                                                                                  DIAGNOSTIC:                                                                  Patient has a right dominant coronary artery.                                                                                                             - Left Main Coronary Artery:                                                 The LM is occluded. There is JOURDAN 0 flow.                                                                                                                 - Left Anterior Descending Artery:                                           The LAD has luminal irregularities. There is JOURDAN 3 flow. Fed                from the LIMA graft                                                                                                                                       - Left Circumflex Artery:                                                    The LCX was not found.                                                                                                                                    - Right Coronary Artery:                                                     The RCA has luminal irregularities. There is JOURDAN 3 flow.                    appear to be fed from SVG, could not cannulate, poorly visualized.                                                                                        - Aortic Root:                                                               The Aortic Root is normal. There is JOURDAN 3 flow.                             Lesion Details: Moderate proximal tortuosity, mild to                        moderate calcification.                                                                                                                                    - HERRON To LAD:                                                               The LIMA to LAD is normal. There is JOURDAN 3 flow.                                                                                                                                                                                       D. SUMMARY:                                                                                                                                               1. Three vessel coronary artery disease.                                     2. Normal LVEF.                                                              3. Mild aortic insufficiency.                                                4. Very difficult study                                                      5. Multiple attempts to cannulate SVG which appears to go to the RCA.        Native RCA appears to have an ostial occlusion.    Was continued on optimal medical management. Could not tolerate atenolol due to severe weakness. Recently without any cardiac complaints. Limited by left knee with soreness and pains. Went to [facility] in [place], [place] and told of the prior implant was too small and not stable. Anticipating re-op by Dr. Fox, a [employer] graduate, in [place]. [place].    Since visit of 10/2/2012, underwent left TKR with good results, had 16 weeks of rehab without much problem. Here today due to hypertension. Home record showed -185/83-99. Noted more dizziness when the pressures are high. Problem is helped by Meclizine. Have decreased exercise following the operation. Diet said to be no salt. No problem with medications,  need 90 days supply.    Since visit of 4/4, concern about his kidney, see telephone enc on 4/22 with BUN of 28, Scr 1.4, K+ 4.1, and eGFR 50. Old record reviewed, no significant julian in function since  6/2011. Agree now to try HCTZ 12.5 mg every other day along with increase in lisinopril to 80 mg daily. Home BP reviewed 159-189/85-99. No other new problem. Wants comprehensive blood work the next visit.    Since visit of 12/13, left sided CP is gone, appears to be due to straining while carrying a 34 lbs grandson. Discussed cath report and send to [facility]. Home /80. Daughter feels he is stressed, personally do not feel so. Would like to be back in [place] but not family are here. Ill younger sister in [place]. Biking 3 times a week for 15 to 30 minutes twice a day. Do little weights every day.  Lexiscan, 12/19/2013  Nuclear Quantitative Functional Analysis:   LVEF: 42 % (normal is 47 - 59)  LVED Volume: 193 ml (normal is 91 - 155)  LVES Volume: 112 ml (normal is 40 - 78)    Impression: ABNORMAL MYOCARDIAL PERFUSION  1. There is evidence for mild myocardial ischemia in the anterior and lateral apical walls of the left ventricle with associated stress induced LV cavity dilatation.   2. The perfusion scan is free of evidence for myocardial injury.   3. There is abnormal wall motion at rest showing moderate global hypokinesis of the left ventricle.   4. There is resting LV dysfunction with a reduced ejection fraction of 42 %.  (normal is 47 - 59)  5. The left ventricular volume is moderately increased at rest.   6. The extracardiac distribution of radioactivity is normal.   7. When compared to the previous study from 04/12/2012, the LVEF have decreased with global hypokinesia.  8. [facility] SSS =2 =SDS, suggest that 2.5% of myocardium may be ischemic.    ECHO, 12/2013, CONCLUSIONS     1 - Biatrial enlargement.     2 - Enlarged left ventricular enlargement.     3 - Eccentric hypertrophy.     4 - Low normal to mildly depressed left ventricular systolic function (EF 50-55%).     5 - Left ventricular diastolic dysfunction.     6 - Mild mitral regurgitation.     7 - Normal right ventricular systolic function .      8 - Mild to moderate aortic regurgitation.     9 - Some improvement in LV function with reduction in AR from echo on 9/29/2012.  Since visit of 5/2, had to go to [place] for sister, 60 year old with a CVA. Stopped HCTZ for 5 weeks due to traveling. No CP but some return of indigestion, previously helped by Prilosec. Used for about 4 weeks with benefit. Discuss Rx 4-8 weeks treatments are reasonable. Blood work on 6/3 LDL is 113, , with HDL of 33. Cr. Remains stable at 1.4 with K+ 4.1. PSA is elevated to > 4 on finasteride 5 mg for past 10 years, prescribed by Urologist in [place].  CBC is normal.    Since visit of 6/11, did not get the referral to Urology, also had recent fever and chills with lower abdominal pain while in [place] last Thursday to Friday, noted some weakened stream. Also at night can hear strong heart beat and take BP showing , would then take an additional 40 mg of lisinopril on top of 80 mg each AM. Blood work showed first time elevation of Scr to 1.6 without change in BUN and normal K+.     Since visit of 7/11, had problem with spironolactone, took only 2 doses and had severe dizziness for 2 days, also had to adjust timing of medications to avoid dizziness after medications. 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 Sleeping well. Other medications are fine, compliant. Blood work showed slight rise in Scr to 1.5-1.6 from 1.4 after spironolactone. Saw Dr. Herring and given antibiotic. BP at home 140-160/80.    Since visit of 8/15, feeling fine, no problem with medications, home BP similar with systolic of 140-150. 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 No problem note, no limits. Using Tylenol 500 mg BID for OA, helpful. Sleeping well, tried Melatonin and now just using warm milk at HS. Blood work reviewed, normal testosterone, uric acid 6.5, BMP with stable Cr of 1.5 with FBS of 103, Lipid panel  "shows LDL of 78.8 on 80 mg of atorvastatin.     Since visit of 9/17, saw Dr. Calderon for pre-op evaluation and suggested sooner follow up for abnormal stress test. Denies any CP but with occasional chest "congestion" with pleuritic CP with deep breathing, Occurs now and then, sometime related to heavy exertion. Helped with breathing Rx in hospital and albuterol inhaler at home. Last spell about a month ago, lasted for 30 minutes. Had OP left operation on 11/25, no problem. Since home no problem. CXR on 11/6 was normal. Recent labs - LDL 86, HDL 36, normal CMP and CBC.  Serum Cr 1.4, eGFR 49.1, stable. Request nebulizer for home use.    Since visit of 12/10, next morning woke up with high BP, 184/102, felt dizzy, no fall, then a constant heart "hurting", grade 5/10, seems to increase after meal, tried Rolaids and Tums without much help. Pain constant til now. Call answering service at 6 AM and advised to come to office for EKG and evaluation. EKG NSR with frequent APC, rate of 68, LVH with STT abnormalities, no acute change. Discussed atypical presentation of heart pains and also possible GI source of problem. Have not seen any GI specialist for his GERD. Also discussed use of NTG for chest pains. BP at home this AM, 174/100, obviously distressed.    Since visit of 1/9/2014, continue with some back pain over past 6 months, concern possibly due to high dose atorvastatin. Also noted some fatigue with palpitation in the middle of the night, have to get up 3 times for nocturia. No CP but BP elevated in the middle of night to 140/92. Last Holter in 4/2012: PREDOMINANT RHYTHM  1. Sinus rhythm with heart rates varying between 40 and 195 bpm with an average of 77 bpm.     VENTRICULAR ARRHYTHMIAS  1. There were occasional PVCs totalling 290 and averaging 12 per hour.  There were 2 couplets.    2. There were no episodes of ventricular tachycardia.    SUPRA VENTRICULAR ARRHYTHMIAS  1. There were very frequent PACs totalling 5254 " and averaging 228 per hour.  There were 143 couplets.    2. There were no episodes of sustained supraventricular tachycardia.    SINUS NODE FUNCTION  1. There was no evidence of high grade SA nicolás block.     AV CONDUCTION  1. There was no evidence of high grade AV block.     DIARY  1. The diary was not returned    MISCELLANEOUS  1. This was a tape of adequate length (23 hrs).    Also worries about friend in [place] dying without a definite cause. Some worries of kidney and liver due to medications. Last labs in 11/2013 reviewed.     Since visit of 3/17, no new problem, no further CP,   Holter done without symptoms - PREDOMINANT RHYTHM  1. Sinus arrhythmia with heart rates varying between 41 and 164 bpm with an average of 79 bpm.     VENTRICULAR ARRHYTHMIAS  1. There were occasional mostly monomorphic PVCs totalling 272 and averaging 11 per hour.  There was 1 couplet.    2. There were no episodes of ventricular tachycardia.    SUPRA VENTRICULAR ARRHYTHMIAS  1. There were frequent PACs totalling 1748 and averaging 72 per hour.  There were 9 bigeminal cycles.  There were 47 couplets. There were 5 triplets.     2. 1.5% of complexes.    3. There were no episodes of sustained supraventricular tachycardia.    SINUS NODE FUNCTION  1. There was no evidence of high grade SA nicolás block.     AV CONDUCTION  1. There was no evidence of high grade AV block.     2. The longest RR interval was 2110 msec.     DIARY  1. The diary was returned, but not completed    MISCELLANEOUS  1. This was a tape of adequate length (24 hrs).    Labs showed new glucose intolerance, , admit to using lot of sugar recently. CK is elevated with back pain. Last Lipid in 11/2013 LDL-C was 86.4. Sleep evaluation next month.     Since visit of 3/28/2014, stressed due to close sister illnesses with Alzheimer in [place], stayed there for 8 weeks and recent return with weight gain. No definite muscular pains still with mild chronic low back pain. CP is better,  occasional burning.  Home BP reviewed, mostly > 150/80, have occasional HA, every other week. Using HCTZ 25 mg , half tab every other day. Not yet to sleep evaluation. Discussed potential cause of resistant HTN due to untreated GRICELDA.  Labs reviewed CPK remains elevated 362 to 421, FBS improved from 127 to 110. Renal stable with Cr 1.6 to 1.5, K+ 4.1. Lipid LDL-C 69.8. Discussed optional of trying 10 mg of atorvastatin or continuing on 40 mg and be aware of muscle pains / weakness and to monitor CPK.    Since visit of 6/26, no new problem, labs showed slow progressive CKD eGFR now 40, Cr 1.7, BUN 25, . Want to see nephrologist.  Had Sleep study on 6/30 - CONCLUSION:  Nocturnal polysomnography demonstrates:  1.  Obstructive sleep apnea to be present with 44.2 events an hour with    desaturation to 81%.  2.  Sinus rhythm with occasional PVC.     PLAN:  He will return for nasal CPAP titration at a later date.    Since visit of 7/25, OK til 3 weeks ago, started to experience ARMAS, any rushing, similar to prior to CABG. Some CP, more like burning, grade 1/10, last until relax. ECG today SA, rate 56. 1st degree AVB, LVH with ST abnormalities. Called for earlier appointment. No problem with the medications. Just started CPAP this past Sunday, 3 days ago. Last lab again reviewed - K+ 4.1. Had prior problem with spironolactone. Home /95.     Since visit of 9/10, the CP and ARMAS have improved. Could not tolerate eplerenone 25 mg due to marked bradycardia, rate to 40 along with tiredness and weakness, not the expected side effects. Home /79, feeling better except for dry cough, nasal congestion, and bad HA, recurrent problem over the years. Best Rx with short course of Augmentin. Given Doxycycline without benefit. Lab today , BMP with Cr stable at 1.6, K+ 4.0. Also wants review with GI on GERD, and not able to lose weight.    Since visit of 9/24/2014, no new problem, no problem with medications. GRICELDA  reviewed, troubled by being awaken hourly during testing. Denies any fatigue, lots of energy. Labs - stable CKD eGFR 42, , K+ 4.1. Last urine 6/2013. Home BP good at 140/80-85. 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     Since visit of 12/5/2014, had problem with diverticulitis last month now corrected diet, no nuts, more fiber. Denies any CP nor SOB. No sleepiness. Weight up and down. Recent labs A1C 6.2%, LDL-C 59, Hgb 13.3, CMP showed eGFR 39 with Cr 1.7, K+ 3.8.    Since visit of 3/19/2015, no new problem. Had review with Dr. Álvarez, X-ray negative and completed 6 weeks of PT without much benefit. Being closely followed by Dr. Hodgson, recent labs shows eGFR 39, Cr 1.7, have proteinuria, , mild anemia, Hgb 13.4, iron profile is sufficient. Lipid excellent, LDL 58, non-HDL 89.    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 Today discomfort started after 3 zandra, had to stop for 20 minutes, did not use NTG. Similar problem over the last 8 months. Compliant with medications but home BP are high, 130-188/, HR 65-85. ECG shows NSR PAC, LVH, pulmonary pattern. Last lipid in 8/2015 LDL 58.2, non-HDL 90. Active biking twice a week for 30 minutes then farm walk daily.    Since visit of 2/18, continue to have occasional chest burning when rushing fast across pasture or lifting heavy bags. Has about 5 minutes of discomfort, grade 1/10. Also noted some nighttime dry cough, don't believe due to Lisinopril, like nasal congestion with PND. Daughter have Zyrtec, will try. Discussed options for Rx of Abnormal stress test, had difficult LHC in 2012 along with stage 3 CKD. Will proceed with optimize medical Rx first.   ECHO, 3/2016 CONCLUSIONS     1 - Biatrial enlargement.     2 - Enlarged left ventricular enlargement.     3 - Eccentric hypertrophy.     4 - Low normal to mildly depressed left ventricular systolic function (EF  50-55%).     5 - Mild to moderate aortic regurgitation.     6 - Mild mitral regurgitation.     7 - Left ventricular diastolic dysfunction. E/e'(lat) is 10.  This along with the following abnormalities (ATUL = 49.13 and LVMI = 181.70) suggests significant diastolic dysfunction.     8 - Right ventricular enlargement with mildly depressed systolic function.     9 - The estimated PA systolic pressure is 28 mmHg.     10 - No significant change from Echo on 12/19/2013.     Lexiscan - Nuclear Quantitative Functional Analysis:   LVEF: 34 % (normal is 47 - 59)  LVED Volume: 192 ml (normal is 91 - 155)  LVES Volume: 126 ml (normal is 40 - 78)    Impression: ABNORMAL MYOCARDIAL PERFUSION  1. There is evidence for moderate myocardial ischemia in the inferolateral wall of the left ventricle with associated stress induced LV cavity dilatation with underlying injury present.   2. There is abnormal wall motion at rest showing moderate global hypokinesis of the left ventricle. severe hypokinesis of the inferolateral wall of the left ventricle.   3. There is resting LV dysfunction with a reduced ejection fraction of 34 %.  (normal is 47 - 59)  4. The left ventricular volume is mildly increased at rest.   5. The extracardiac distribution of radioactivity is normal.   6. When compared to the previous study from 12/19/2013, current study indicate inferolateral defects.  7. [employer] SSS=10, SRS=5, and SDS=5, suggest that 7% of myocardium may be ischemic.    Since visit of 3/14, feeling better, wanted no change with medications, long discussion on use of Coreg and need for titration. Also labs reviewed, mild anemia due to CKD, stage 3, eGFR 33, decreased from 40, Dr. Hodgson recommend better cholesterol control. , and non- on 80 mg of Atorvastatin. Want better diet.    Since visit of 4/25 had problem on 5/2/2016 with sudden onset of vertigo and right ear pain. Had review with Dr. Vasquez and medications changes recommended  "see telephone encounter note. Now review medications again and vertigo improved after taking Meclizine 4-25 mg tabs daily. Home BP log 127-170/, HR 63-82. Currently back on Coreg 3.125 mg bid. Discuss hope to proceed with Coreg titration with the goal of 25 mg bid.    In 6/2016, had tangled with the trees with review by Dr. Vasquez, right ribs bruised, no fracture, also vertigo with quick turn, fell. Still with some exertional chest burning, average twice a week, but very brief, just seconds. No problem with medications. Had review with Dr. Nails.    In 7/2016, multiple complains, generalized itching started about a week ago, stopped Coreg but itching still there. Also problem with recurrent vertigo exacerbated by head turning or bending, no fall but bothersome, able to do all farm chores. In addition noted to be more tired with the higher dose of Coreg. No SOB nor CP. Recent labs show NICK with Cr 2.2 to 2.5, eGFR down to 24. Will be seeing nephrologist 8/2/2016.    In 12/2016, problem with acute head congestion, frontal, seasonal, usually helped with Augmentin for 10 days. Requesting Rx, option discussed. Feeling "good" in general, able to do work without problem. Ran out of Zetia over a month ago. Lipid test LDL 84.4 on 40 mg of atorvastatin. Home /75-80. Dr. Vasquez had changed ACE-I to Valsartan due to cough.    In 3/2017, find a little tiredness and daytime sleepiness over the last 2 weeks, change to daylight saving made a little worst. 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 Sleep well, 6-8 hours, feel good on awakening. Home /80. Compliant with medications. Some concern of more frequent BM, 4-5 times daily. Eating more fruit. Lipid LDL 62.    In 6/2017, note lot of urine after kidney biopsy about 4 weeks ago. Biopsy reported as OK. Had stomach biopsy about a week ago, since then been feeling "yukki", was informed to expect it. Home BP is similar to office reading, Remain " active on farm, lot of manual work no problem. No more CP nor ARMAS. ECG today suggest WAP, 64, left axis, LVH, PRWP and high lateral T-wave inversion no change from ECG in 2015. No problem with medications. First cousin age 44 yo,  of massive MI, no symptoms.    Review of Systems      Constitutional: no further chills, fevers, and sweats and recurrent afternoon fatigue, no further head congestion, weight loss of 2 lbs since last visit.  Eyes: negative for icterus, redness and visual disturbance  Respiratory: negative for asthma, no further cough with seasonal sinusitis, no dyspnea on exertion, no hemoptysis, pleurisy/chest pain and wheezing, Kinnear score 0 now 5  Cardiovascular: no further chest pain, chest pressure/discomfort, dyspnea, near-syncope, no further nightly palpitations with less occasional /90, no paroxysmal nocturnal dyspnea and syncope  Gastrointestinal: negative for abdominal pain, nausea and vomiting, no further heart burn on exertion, BM more frequent. Recent black stool post biopsy.  Musculoskeletal: No muscle weakness and myalgias, positive for arthralgias, left knee not as good, benefited from operation, have muscle cramp in the left leg post op  Neurological: negative for coordination problems, no further dizziness after medications, takes in interval, no gait problems, less headaches, likely sinus, no paresthesia, tremors and vertigo, sleeping 8 hours nightly.  Psych: no depression nor anxious, close sister (71 year old) passed in [place] 2016.    Objective:    Physical Exam     Constitutional: He appears healthy. No distress.   HENT:   Mouth/Throat: Dentition is normal.   Eyes: Conjunctivae are normal.   Neck: Thyroid normal. Neck supple.   Cardiovascular: Normal rate, regular rhythm and normal pulses. PMI is not displaced. Exam reveals no gallop.   Medium-pitched machinery crescendo-decrescendo early systolic murmur is present with a grade of 3/6 at the upper right sternal  "border, upper left sternal border and apex   Pulses:   Carotid pulses are 2+ on the right side, and 2+ on the left side.   Dorsalis pedis pulses are 2+ on the right side, and 2+ on the left side.   Pulmonary/Chest: Breath sounds normal. He exhibits no tenderness.   Abdominal: Soft, very active bowel sounds are normal. Waist 44.5" increased to 46.5"  Musculoskeletal: He exhibits trace pitting edema around the sock line.   Neurological: He is alert and oriented to person, place, and time. Gait normal.   Skin: Skin is warm and dry. No cyanosis. No pallor. Nails show no clubbing.     Assessment:       1. Coronary artery disease involving native heart without angina pectoris, unspecified vessel or lesion type    2. CKD (chronic kidney disease), stage III, eGFR 40, 7/2014    3. Essential hypertension    4. Pure hypercholesterolemia    5. LVH (left ventricular hypertrophy) due to hypertensive disease, without heart failure    6. Obesity, Class I, BMI 32.8 to 34.3, 32.7, down to 32.1         Plan:   Micheal was seen today for follow-up.    Diagnoses and associated orders for this visit:    Coronary artery disease involving native heart without angina pectoris, unspecified vessel or lesion type    CKD (chronic kidney disease), stage III, eGFR 40, 7/2014    Essential hypertension    Pure hypercholesterolemia    LVH (left ventricular hypertrophy) due to hypertensive disease, without heart failure    Obesity, Class I, BMI 32.8 to 34.3, 32.7, down to 32.1    - CV status stable, continue current Rx, all medications reviewed, patient acknowledge good understanding.   Instruction for Mediterranean diet and heart healthy exercise given.  - Weigh twice weekly, try to lose 1-2 lbs per week  - Belly exercise daily  - Follow up in 3 months per patient's preference    LV dysfunction, EF 50%, reduced to 34%  -     Reduce carvedilol (COREG) 6.25 MG tablet; Take 1 tablet (6.25 mg total) by mouth 2 (two) times daily with meals.  Dispense: 90 " tablet; Refill: 3    Vertigo - felt may be due to chronic sinusitis    Chronic fatigue - improved    Benign non-nodular prostatic hyperplasia without lower urinary tract symptoms  -     Change terazosin (HYTRIN) 10 MG capsule; Take 2 capsules (20 mg total) by mouth every evening.  Dispense: 180 capsule; Refill: 3    Angina of effort, onset 6/2015 - resolved    Abnormal cardiovascular stress test  - Check home blood pressure, 2 days weekly, do 2 readings within 5 minutes in AM and PM, keep log for review.    Proteinuria    Sleep disorder - improved    Diastolic dysfunction    ARMAS (dyspnea on exertion) - improved    Anemia, mild    Abdominal obesity    OA, post left TKR      Patient Active Problem List   Diagnosis    Osteoarthritis of right knee, L-TKR 10/2012    Nocturia    Hematuria    BPH (benign prostatic hyperplasia)    Carotid stenosis    HTN (hypertension),     Hyperlipidemia, baseline     CAD (coronary artery disease), 2V CABG 2007    Gout, arthritis    Obesity, Class I, BMI 32.8 to 34.3, 32.7, down to 32.1    Vertigo    Tinea pedis    LVH (left ventricular hypertrophy) due to hypertensive disease    Abnormal cardiovascular stress test    CKD (chronic kidney disease), stage III, eGFR 40, 7/2014    GERD (gastroesophageal reflux disease)    Elevated PSA    Sleep disorder    Acquired hammer toe    Diastolic dysfunction    Abdominal obesity    Back pain, chronic    Glucose intolerance (impaired glucose tolerance)    Anemia, mild    Gastric nodule    Diverticulitis, 2005, 2015    Personal history of colonic polyps    PSA elevation    DDD (degenerative disc disease), lumbar    LV dysfunction, EF 50%, reduced to 34%    Other spondylosis, lumbar region    Knee pain    NICK (acute kidney injury)     Total face-to-face time with the patient was 30 minutes and greater than 50% was spent in counseling and coordination of care. The above assessment and plan have been discussed at  length. Labs and procedure reviewed. Problem List updated. Asked to bring in all active medications / pills bottles with next visit.

## 2017-06-08 ENCOUNTER — TELEPHONE (OUTPATIENT)
Dept: GASTROENTEROLOGY | Facility: CLINIC | Age: 77
End: 2017-06-08

## 2017-06-08 DIAGNOSIS — K31.89 GASTRIC NODULE: Primary | ICD-10-CM

## 2017-06-08 NOTE — TELEPHONE ENCOUNTER
Patient says that since Sunday, his stools have been dark. He says his stomach is tender.  He is not lightheaded or dizziness    [phone]

## 2017-06-09 DIAGNOSIS — I25.10 CORONARY ARTERY DISEASE INVOLVING NATIVE CORONARY ARTERY OF NATIVE HEART WITHOUT ANGINA PECTORIS: Primary | ICD-10-CM

## 2017-06-12 ENCOUNTER — TELEPHONE (OUTPATIENT)
Dept: GASTROENTEROLOGY | Facility: CLINIC | Age: 77
End: 2017-06-12

## 2017-06-12 NOTE — TELEPHONE ENCOUNTER
I spoke with patient in regards to getting CBC. He says his stools are back to normal and he has been in contact with Dr Pelletier's office.

## 2017-06-14 ENCOUNTER — OFFICE VISIT (OUTPATIENT)
Dept: FAMILY MEDICINE | Facility: CLINIC | Age: 77
End: 2017-06-14
Payer: MEDICARE

## 2017-06-14 VITALS
WEIGHT: 239.19 LBS | BODY MASS INDEX: 31.7 KG/M2 | SYSTOLIC BLOOD PRESSURE: 138 MMHG | HEIGHT: 73 IN | TEMPERATURE: 99 F | HEART RATE: 64 BPM | DIASTOLIC BLOOD PRESSURE: 82 MMHG

## 2017-06-14 DIAGNOSIS — R05.3 PERSISTENT COUGH: ICD-10-CM

## 2017-06-14 DIAGNOSIS — J32.4 PANSINUSITIS, UNSPECIFIED CHRONICITY: Primary | ICD-10-CM

## 2017-06-14 DIAGNOSIS — J32.9 SINUSITIS, UNSPECIFIED CHRONICITY, UNSPECIFIED LOCATION: ICD-10-CM

## 2017-06-14 PROCEDURE — 99213 OFFICE O/P EST LOW 20 MIN: CPT | Mod: PBBFAC,PO | Performed by: FAMILY MEDICINE

## 2017-06-14 PROCEDURE — 1159F MED LIST DOCD IN RCRD: CPT | Mod: ,,, | Performed by: FAMILY MEDICINE

## 2017-06-14 PROCEDURE — 99999 PR PBB SHADOW E&M-EST. PATIENT-LVL III: CPT | Mod: PBBFAC,,, | Performed by: FAMILY MEDICINE

## 2017-06-14 PROCEDURE — 96372 THER/PROPH/DIAG INJ SC/IM: CPT | Mod: PBBFAC,PO

## 2017-06-14 PROCEDURE — 1125F AMNT PAIN NOTED PAIN PRSNT: CPT | Mod: ,,, | Performed by: FAMILY MEDICINE

## 2017-06-14 PROCEDURE — 99214 OFFICE O/P EST MOD 30 MIN: CPT | Mod: S$PBB,,, | Performed by: FAMILY MEDICINE

## 2017-06-14 RX ORDER — IPRATROPIUM BROMIDE AND ALBUTEROL SULFATE 2.5; .5 MG/3ML; MG/3ML
3 SOLUTION RESPIRATORY (INHALATION) EVERY 6 HOURS PRN
Qty: 30 VIAL | Refills: 0 | Status: SHIPPED | OUTPATIENT
Start: 2017-06-14 | End: 2017-09-05

## 2017-06-14 RX ORDER — AMOXICILLIN AND CLAVULANATE POTASSIUM 875; 125 MG/1; MG/1
1 TABLET, FILM COATED ORAL 2 TIMES DAILY
Qty: 20 TABLET | Refills: 0 | Status: SHIPPED | OUTPATIENT
Start: 2017-06-14 | End: 2017-06-20 | Stop reason: ALTCHOICE

## 2017-06-14 RX ORDER — BETAMETHASONE SODIUM PHOSPHATE AND BETAMETHASONE ACETATE 3; 3 MG/ML; MG/ML
9 INJECTION, SUSPENSION INTRA-ARTICULAR; INTRALESIONAL; INTRAMUSCULAR; SOFT TISSUE
Status: COMPLETED | OUTPATIENT
Start: 2017-06-14 | End: 2017-06-14

## 2017-06-14 RX ADMIN — BETAMETHASONE SODIUM PHOSPHATE AND BETAMETHASONE ACETATE 9 MG: 3; 3 INJECTION, SUSPENSION INTRA-ARTICULAR; INTRALESIONAL; INTRAMUSCULAR at 09:06

## 2017-06-14 NOTE — PROGRESS NOTES
2 patient identifiers used ( name &  ).  Administered 9 mg Celestone RUOQG IM.  Patient tolerated well.  No bleeding at insertion site noted.  Pain scale 0/10.  Allergies reviewed.    Aseptic technique maintained

## 2017-06-14 NOTE — PROGRESS NOTES
Subjective:       Patient ID: Micheal uHnt is a 76 y.o. male.    Chief Complaint: URI    URI    Associated symptoms include coughing, headaches and a sore throat. Pertinent negatives include no abdominal pain, chest pain, nausea or rash.   Sinusitis   This is a new problem. The current episode started 1 to 4 weeks ago. The problem has been gradually worsening since onset. There has been no fever. The pain is moderate. Associated symptoms include coughing, headaches, sinus pressure and a sore throat. Pertinent negatives include no shortness of breath. (Purulent PND noted; much cough) Past treatments include saline sprays. The treatment provided no relief.     Review of Systems   Constitutional: Negative for fever.   HENT: Positive for sinus pressure and sore throat.    Respiratory: Positive for cough. Negative for shortness of breath.    Cardiovascular: Negative for chest pain.   Gastrointestinal: Negative for abdominal pain and nausea.   Skin: Negative for rash.   Neurological: Positive for headaches. Negative for numbness.   All other systems reviewed and are negative.      Objective:      Physical Exam   Constitutional: He appears well-developed. No distress.   HENT:   Right Ear: Tympanic membrane is bulging. A middle ear effusion is present.   Left Ear: A middle ear effusion is present.   Nose: Mucosal edema present. Right sinus exhibits maxillary sinus tenderness and frontal sinus tenderness.   Mouth/Throat: Oropharyngeal exudate and posterior oropharyngeal erythema present.   Neck: Neck supple.   Cardiovascular: Normal rate and regular rhythm.    No murmur heard.  Pulmonary/Chest: Effort normal and breath sounds normal.   Lymphadenopathy:     He has no cervical adenopathy.   Skin: Skin is warm and dry.       Assessment:       1. Pansinusitis, unspecified chronicity    2. Persistent cough    3. Sinusitis, unspecified chronicity, unspecified location        Plan:         Micheal was seen today for  uri.    Diagnoses and all orders for this visit:    Pansinusitis, unspecified chronicity  -     amoxicillin-clavulanate 875-125mg (AUGMENTIN) 875-125 mg per tablet; Take 1 tablet by mouth 2 (two) times daily. Take with food  -     betamethasone acetate-betamethasone sodium phosphate injection 9 mg; Inject 1.5 mLs (9 mg total) into the muscle one time.    Persistent cough  -     albuterol-ipratropium 2.5mg-0.5mg/3mL (DUO-NEB) 0.5 mg-3 mg(2.5 mg base)/3 mL nebulizer solution; Take 3 mLs by nebulization every 6 (six) hours as needed for Wheezing. Rescue    Sinusitis, unspecified chronicity, unspecified location

## 2017-06-14 NOTE — PATIENT INSTRUCTIONS
Weight Management: Getting Started  Healthy bodies come in all shapes and sizes. Not all bodies are made to be thin. For some people, a healthy weight is higher than the average weight listed on weight charts. Your healthcare provider can help you decide on a healthy weight for you.    Reasons to lose weight  Losing weight can help with some health problems, such as high blood pressure, heart disease, diabetes, sleep apnea, and arthritis. You may also feel more energy.  Set your long-term goal  Your goal doesn't even have to be a specific weight. You may decide on a fitness goal (such as being able to walk 10 miles a week), or a health goal (such as lowering your blood pressure). Choose a goal that is measurable and reasonable, so you know when you've reached it. A goal of reaching a BMI of less than 25 is not always reasonable (or possible).   Make an action plan  Habits dont change overnight. Setting your goals too high can leave you feeling discouraged if you cant reach them. Be realistic. Choose one or two small changes you can make now. Set an action plan for how you are going to make these changes. When you can stick to this plan, keep making a few more small changes. Taking small steps will help you stay on the path to success.  Track your progress  Write down your goals. Then, keep a daily record of your progress. Write down what you eat and how active you are. This record lets you look back on how much youve done. It may also help when youre feeling frustrated. Reward yourself for success. Even if you dont reach every goal, give yourself credit for what you do get done.  Get support  Encouragement from others can help make losing weight easier. Ask your family members and friends for support. They may even want to join you. Also look to your healthcare provider, registered dietitian, and  for help. Your local hospital can give you more information about nutrition, exercise, and  weight loss.  Date Last Reviewed: 1/31/2016 © 2000-2016 [employer]. [address] [place] [address], [place] [address]. All rights reserved. This information is not intended as a substitute for professional medical care. Always follow your healthcare professional's instructions.        Walking for Fitness  Fitness walking has something for everyone, even people who are already fit. Walking is one of the safest ways to condition your body aerobically. It can boost energy, help you lose weight, and reduce stress.    Physical benefits  · Walking strengthens your heart and lungs, and tones your muscles.  · When walking, your feet land with less impact than in other sports. This reduces chances of muscle, bone, and joint injury.  · Regular walking improves your cholesterol levels and lowers your risk of heart disease. And it helps you control your blood sugar if you have diabetes.  · Walking is a weight-bearing activity, which helps maintain bone density. This can help prevent osteoporosis.  Personal rewards  · Taking walks can help you relax and manage stress. And fitness walking may make you feel better about yourself.  · Walking can help you sleep better at night and make you less likely to be depressed.  · Regular walking may help maintain your memory as you get older.  · Walking is a great way to spend extra time with friends and family members. Be sure to invite your dog along!  Q&A about fitness walking  Q: Will walking keep me fit?  A: Yes. Regular walking at the right pace gives you all the benefits of other aerobic activities, such as jogging and swimming.  Q: Will walking help me lose weight and keep it off?  A: Yes. Per mile, walking can burn as many calories as jogging. Your health care provider can help work walking into your weight-loss plan.  Q: Is walking safe for my health?  A: Yes. Walking is safe if you have high blood pressure, diabetes, heart disease, or other conditions. Talk to your health  care provider before you start.  Date Last Reviewed: 5/9/2015  © 7948-5634 [employer]. [address] [place] [address], [place] [address]. All rights reserved. This information is not intended as a substitute for professional medical care. Always follow your healthcare professional's instructions.

## 2017-06-15 ENCOUNTER — OFFICE VISIT (OUTPATIENT)
Dept: PHYSICAL MEDICINE AND REHAB | Facility: CLINIC | Age: 77
End: 2017-06-15
Payer: MEDICARE

## 2017-06-15 VITALS
HEIGHT: 73 IN | HEART RATE: 59 BPM | WEIGHT: 242.75 LBS | DIASTOLIC BLOOD PRESSURE: 75 MMHG | BODY MASS INDEX: 32.17 KG/M2 | SYSTOLIC BLOOD PRESSURE: 146 MMHG | TEMPERATURE: 98 F

## 2017-06-15 DIAGNOSIS — M17.0 PRIMARY OSTEOARTHRITIS OF BOTH KNEES: Primary | ICD-10-CM

## 2017-06-15 DIAGNOSIS — I10 ESSENTIAL HYPERTENSION: ICD-10-CM

## 2017-06-15 PROCEDURE — 99499 UNLISTED E&M SERVICE: CPT | Mod: S$PBB,,, | Performed by: PHYSICAL MEDICINE & REHABILITATION

## 2017-06-15 PROCEDURE — 99213 OFFICE O/P EST LOW 20 MIN: CPT | Mod: PBBFAC,PN | Performed by: PHYSICAL MEDICINE & REHABILITATION

## 2017-06-15 PROCEDURE — 20610 DRAIN/INJ JOINT/BURSA W/O US: CPT | Mod: PBBFAC,PN | Performed by: PHYSICAL MEDICINE & REHABILITATION

## 2017-06-15 PROCEDURE — 20611 DRAIN/INJ JOINT/BURSA W/US: CPT | Mod: S$PBB,,, | Performed by: PHYSICAL MEDICINE & REHABILITATION

## 2017-06-15 PROCEDURE — 99999 PR PBB SHADOW E&M-EST. PATIENT-LVL III: CPT | Mod: PBBFAC,,, | Performed by: PHYSICAL MEDICINE & REHABILITATION

## 2017-06-15 PROCEDURE — 20611 DRAIN/INJ JOINT/BURSA W/US: CPT | Mod: PBBFAC,PN | Performed by: PHYSICAL MEDICINE & REHABILITATION

## 2017-06-15 RX ORDER — TERAZOSIN 5 MG/1
5 CAPSULE ORAL NIGHTLY
Qty: 90 CAPSULE | Refills: 0 | Status: SHIPPED | OUTPATIENT
Start: 2017-06-15 | End: 2017-09-05 | Stop reason: SDUPTHER

## 2017-06-15 RX ADMIN — Medication 48 MG: at 09:06

## 2017-06-15 NOTE — PROGRESS NOTES
"PROCEDURE NOTE: risk and benefit of right synvisc injection reviewed with. patient. Verbal consent was obtained. Injection was performed after sterile prep w. Betadine. Lateral approach used. Ultrasound guidance was utilized for needle visualization. Viscosupplementation has been shown to have greater efficiency with Ultrasound guidance. Area was first anesthesized with lidocaine 1% and than injected via lateral approach w. An 18g 1.5 " needle. Synvisc one was used. No complications.       Ultrasound interpretation was performed prior to the procedure to identify the target and any adjacent neurovascular structures.  Subsequently, interpretation was performed during real- time needle guidance confirming placement. Post- intervention interpretation was also performed confirming appropriate injectate flow and hemostasis.  Images indicating needle placement have been saved in [employer].            "

## 2017-06-16 DIAGNOSIS — J30.2 SEASONAL ALLERGIC RHINITIS, UNSPECIFIED ALLERGIC RHINITIS TRIGGER: ICD-10-CM

## 2017-06-16 RX ORDER — FLUTICASONE PROPIONATE 50 MCG
1 SPRAY, SUSPENSION (ML) NASAL DAILY
Qty: 1 BOTTLE | Refills: 0 | Status: SHIPPED | OUTPATIENT
Start: 2017-06-16 | End: 2017-07-19 | Stop reason: SDUPTHER

## 2017-06-19 ENCOUNTER — DOCUMENTATION ONLY (OUTPATIENT)
Dept: FAMILY MEDICINE | Facility: CLINIC | Age: 77
End: 2017-06-19

## 2017-06-19 NOTE — PROGRESS NOTES
Pre-Visit Chart Review  For Appointment Scheduled on 06/20/2017    Health Maintenance Due   Topic Date Due    TETANUS VACCINE  09[birth year]27/1958    Zoster Vaccine  09/27/1999

## 2017-06-20 ENCOUNTER — OFFICE VISIT (OUTPATIENT)
Dept: PODIATRY | Facility: CLINIC | Age: 77
End: 2017-06-20
Payer: MEDICARE

## 2017-06-20 ENCOUNTER — OFFICE VISIT (OUTPATIENT)
Dept: FAMILY MEDICINE | Facility: CLINIC | Age: 77
End: 2017-06-20
Payer: MEDICARE

## 2017-06-20 VITALS
BODY MASS INDEX: 32.52 KG/M2 | DIASTOLIC BLOOD PRESSURE: 71 MMHG | WEIGHT: 245.38 LBS | SYSTOLIC BLOOD PRESSURE: 127 MMHG | TEMPERATURE: 98 F | HEART RATE: 59 BPM | HEIGHT: 73 IN

## 2017-06-20 VITALS — HEIGHT: 73 IN | BODY MASS INDEX: 32.52 KG/M2 | WEIGHT: 245.38 LBS

## 2017-06-20 DIAGNOSIS — J45.30 ASTHMA WITH ALLERGIC RHINITIS, MILD PERSISTENT, UNCOMPLICATED: ICD-10-CM

## 2017-06-20 DIAGNOSIS — L60.0 INGROWN NAIL: ICD-10-CM

## 2017-06-20 DIAGNOSIS — L03.011 PARONYCHIA, RIGHT: ICD-10-CM

## 2017-06-20 DIAGNOSIS — L60.8 PINCER NAIL DEFORMITY: ICD-10-CM

## 2017-06-20 DIAGNOSIS — M79.674 TOE PAIN, RIGHT: Primary | ICD-10-CM

## 2017-06-20 DIAGNOSIS — I10 ESSENTIAL HYPERTENSION: Primary | ICD-10-CM

## 2017-06-20 PROCEDURE — 99214 OFFICE O/P EST MOD 30 MIN: CPT | Mod: S$PBB,25,, | Performed by: PODIATRIST

## 2017-06-20 PROCEDURE — 99213 OFFICE O/P EST LOW 20 MIN: CPT | Mod: PBBFAC,27,PO | Performed by: PODIATRIST

## 2017-06-20 PROCEDURE — 11730 AVULSION NAIL PLATE SIMPLE 1: CPT | Mod: S$PBB,T5,, | Performed by: PODIATRIST

## 2017-06-20 PROCEDURE — 1159F MED LIST DOCD IN RCRD: CPT | Mod: S$PBB,,, | Performed by: PODIATRIST

## 2017-06-20 PROCEDURE — 99999 PR PBB SHADOW E&M-EST. PATIENT-LVL IV: CPT | Mod: PBBFAC,,, | Performed by: FAMILY MEDICINE

## 2017-06-20 PROCEDURE — 1125F AMNT PAIN NOTED PAIN PRSNT: CPT | Mod: ,,, | Performed by: FAMILY MEDICINE

## 2017-06-20 PROCEDURE — 11730 AVULSION NAIL PLATE SIMPLE 1: CPT | Mod: PBBFAC,PO | Performed by: PODIATRIST

## 2017-06-20 PROCEDURE — 1125F AMNT PAIN NOTED PAIN PRSNT: CPT | Mod: S$PBB,,, | Performed by: PODIATRIST

## 2017-06-20 PROCEDURE — 99999 PR PBB SHADOW E&M-EST. PATIENT-LVL III: CPT | Mod: PBBFAC,,, | Performed by: PODIATRIST

## 2017-06-20 PROCEDURE — 99214 OFFICE O/P EST MOD 30 MIN: CPT | Mod: S$PBB,,, | Performed by: FAMILY MEDICINE

## 2017-06-20 PROCEDURE — 1159F MED LIST DOCD IN RCRD: CPT | Mod: ,,, | Performed by: FAMILY MEDICINE

## 2017-06-20 RX ORDER — BUDESONIDE AND FORMOTEROL FUMARATE DIHYDRATE 160; 4.5 UG/1; UG/1
1 AEROSOL RESPIRATORY (INHALATION) EVERY 12 HOURS
Qty: 2 INHALER | Refills: 3 | Status: SHIPPED | OUTPATIENT
Start: 2017-06-20 | End: 2017-09-05

## 2017-06-20 RX ORDER — PROMETHAZINE HYDROCHLORIDE AND CODEINE PHOSPHATE 6.25; 1 MG/5ML; MG/5ML
5 SOLUTION ORAL EVERY 6 HOURS PRN
Qty: 180 ML | Refills: 1 | Status: SHIPPED | OUTPATIENT
Start: 2017-06-20 | End: 2017-06-30

## 2017-06-20 RX ORDER — DOXYCYCLINE 100 MG/1
100 CAPSULE ORAL EVERY 12 HOURS
Qty: 20 CAPSULE | Refills: 1 | Status: SHIPPED | OUTPATIENT
Start: 2017-06-20 | End: 2017-08-18 | Stop reason: ALTCHOICE

## 2017-06-20 RX ORDER — AZELASTINE 1 MG/ML
SPRAY, METERED NASAL
Qty: 30 ML | Refills: 5 | Status: SHIPPED | OUTPATIENT
Start: 2017-06-20 | End: 2017-09-05

## 2017-06-20 NOTE — PROGRESS NOTES
Subjective:      Patient ID: Micheal Hunt is a 76 y.o. male.    Chief Complaint: Ingrown Toenail (right great toe)  Patient presents to clinic with the chief complaint of pain in the Rt. Great toenail.  Relates stubbing the nail over three months ago with ensuing bruising of the nail plate and pain on account of thickness.  States the nail was normal in shape prior to the injury.  Describes pain as sharp and rates as a 6/10.  States symptoms are exacerbated with pressure to the nail plate and alleviated with doffing of shoe gear.  Has not attempted to self treat.  Denies any additional pedal complaints.       Past Medical History:   Diagnosis Date    Allergy     Arthritis     Gout    BPH (benign prostatic hyperplasia)     CAD (coronary artery disease) 2006    Chronic kidney disease     due to ibuprofen    Colon polyp     Diverticulosis     GERD (gastroesophageal reflux disease)     HTN (hypertension) 3/27/2012    Hyperlipidemia     LVH (left ventricular hypertrophy)     Murmur, cardiac 3/27/2012    GRICELDA (obstructive sleep apnea)     DOES NOT USE A MACHINE    Sinus problem     Syncope and collapse        Past Surgical History:   Procedure Laterality Date    APPENDECTOMY      CARDIAC CATHETERIZATION      COLONOSCOPY  2011    CORONARY ARTERY BYPASS GRAFT  4/2007    x 1    JOINT REPLACEMENT      left knee total replacement  X 3    mid leftt finger      from a cactuss    MOLE REMOVAL  2016    rotative cuff      no rotative cuffs on bilat shoulders has pins     SHOULDER SURGERY      shoulder surgery bilat  RIGHT X 4; LEFT X 3       Family History   Problem Relation Age of Onset    Heart disease Mother     Sudden death Father     Stroke Sister     Heart disease Sister     Kidney cancer Neg Hx     Prostate cancer Neg Hx     Urolithiasis Neg Hx     Allergic rhinitis Neg Hx     Allergies Neg Hx     Angioedema Neg Hx     Asthma Neg Hx     Atopy Neg Hx     Eczema Neg Hx      Immunodeficiency Neg Hx     Rhinitis Neg Hx     Urticaria Neg Hx        Social History     Social History    Marital status:      Spouse name: N/A    Number of children: N/A    Years of education: N/A     Social History Main Topics    Smoking status: Never Smoker    Smokeless tobacco: Never Used    Alcohol use Yes      Comment: rare    Drug use: No    Sexual activity: Not Asked     Other Topics Concern    None     Social History Narrative    None       Current Outpatient Prescriptions   Medication Sig Dispense Refill    albuterol-ipratropium 2.5mg-0.5mg/3mL (DUO-NEB) 0.5 mg-3 mg(2.5 mg base)/3 mL nebulizer solution Take 3 mLs by nebulization every 6 (six) hours as needed for Wheezing. Rescue 30 vial 0    allopurinol (ZYLOPRIM) 300 MG tablet Take 1 tablet (300 mg total) by mouth once daily. 90 tablet 3    amlodipine (NORVASC) 10 MG tablet Take 1 tablet (10 mg total) by mouth before dinner. 90 tablet 3    aspirin (ECOTRIN) 81 MG EC tablet Take 81 mg by mouth once daily.        atorvastatin (LIPITOR) 80 MG tablet Take 1 tablet (80 mg total) by mouth once daily. 90 tablet 3    azelastine (ASTELIN) 137 mcg (0.1 %) nasal spray USE 2 SPRAYS TWICE DAILY IN EACH NOSTRIL 30 mL 5    budesonide-formoterol 160-4.5 mcg (SYMBICORT) 160-4.5 mcg/actuation HFAA Inhale 1 puff into the lungs every 12 (twelve) hours. Controller 2 Inhaler 3    calcium-vitamin D 500-125 mg-unit tablet Take 1 tablet by mouth as needed.       carvedilol (COREG) 6.25 MG tablet Take 1 tablet (6.25 mg total) by mouth 2 (two) times daily with meals. 90 tablet 3    colchicine 0.6 mg tablet Take 1 tablet (0.6 mg total) by mouth 2 (two) times daily. 10 tablet 3    cyanocobalamin, vitamin B-12, (VITAMIN B-12) 1,000 mcg Subl Take 1 tablet by mouth daily as needed. Takes 3,000mcg      doxycycline (VIBRAMYCIN) 100 MG Cap Take 1 capsule (100 mg total) by mouth every 12 (twelve) hours. 20 capsule 1    finasteride (PROSCAR) 5 mg tablet  TAKE 1 TABLET ONCE DAILY. 90 tablet 3    fluticasone (FLONASE) 50 mcg/actuation nasal spray 1 spray by Each Nare route once daily. 1 Bottle 0    inhalation device (AEROCHAMBER PLUS FLOW-VU) Use as directed for inhalation. 1 Device 0    meclizine (ANTIVERT) 25 mg tablet       nitroGLYCERIN (NITROSTAT) 0.4 MG SL tablet Place 1 tablet (0.4 mg total) under the tongue every 5 (five) minutes as needed for Chest pain. 25 tablet 4    omeprazole (PRILOSEC) 40 MG capsule Take 1 capsule (40 mg total) by mouth 2 (two) times daily before meals. 90 capsule 0    promethazine-codeine 6.25-10 mg/5 ml (PHENERGAN WITH CODEINE) 6.25-10 mg/5 mL syrup Take 5 mLs by mouth every 6 (six) hours as needed for Cough. 180 mL 1    terazosin (HYTRIN) 5 MG capsule Take 1 capsule (5 mg total) by mouth every evening. 90 capsule 0    tramadol (ULTRAM) 50 mg tablet Take 1 tablet (50 mg total) by mouth every 6 (six) hours as needed. 120 tablet 4    valsartan (DIOVAN) 320 MG tablet Take 1 tablet (320 mg total) by mouth once daily. 90 tablet 3     No current facility-administered medications for this visit.        Review of patient's allergies indicates:   Allergen Reactions    Eplerenone Other (See Comments)     Marked bradycardia, 40, tiredness and weakness           Review of Systems   Constitution: Negative for chills and fever.   Cardiovascular: Negative for claudication.   Skin: Positive for color change and nail changes.   Musculoskeletal: Positive for arthritis. Negative for joint swelling.   Neurological: Negative for numbness and paresthesias.           Objective:      Physical Exam   Constitutional: He is oriented to person, place, and time. He appears well-developed and well-nourished. No distress.   Cardiovascular:   Pulses:       Dorsalis pedis pulses are 2+ on the right side, and 2+ on the left side.        Posterior tibial pulses are 2+ on the right side, and 2+ on the left side.   CFT <3 seconds bilateral.  Pedal hair growth  present bilateral.   No varicosities noted bilateral.  No bilateral lower extremity edema.   Musculoskeletal: He exhibits tenderness. He exhibits no edema.   Muscle strength 5/5 in all muscle groups bilateral.  No tenderness nor crepitation with ROM of foot/ankle joints bilateral.  Pain with palpation of the Rt. Hallux nail plate.  Bilateral pes planus foot type.  Bilateral hallux abducto valgus.  Bilateral semi-reducible contracture of toes 2-5.      Neurological: He is alert and oriented to person, place, and time. He has normal strength. No sensory deficit.   Light touch intact bilateral.     Skin: Skin is warm, dry and intact. Capillary refill takes 2 to 3 seconds. Bruising and ecchymosis noted. No abrasion, no burn, no laceration, no lesion, no petechiae and no rash noted. He is not diaphoretic. No erythema. No pallor.   Pedal skin has normal turgor, temperature, and texture bilateral.  Pincer nail of the Rt. Hallux with incurvation of both borders.  Ecchymosis noted throughout the nail plate.  No adjacent signs of infection noted.  Remaining toenails x 9 appear normotrophic. Examination of the skin reveals no evidence of significant maceration, rashes, open lesions, suspicious appearing nevi or other concerning lesions.              Assessment:       Encounter Diagnoses   Name Primary?    Toe pain, right Yes    Ingrown nail     Pincer nail deformity          Plan:       Micheal was seen today for ingrown toenail.    Diagnoses and all orders for this visit:    Toe pain, right    Ingrown nail  -     Nail Removal    Pincer nail deformity  -     Nail Removal      I counseled the patient on his conditions, their implications and medical management.    Discussed, in depth, the risks, benefits, procedure, and follow up care associated with a total avulsion of the Rt. Great toenail. All questions were thoroughly answered. Consent was read, signed, and witnessed. See attached procedure note.     Given verbal and  written wound care/dressing change instructions.     Rest, ice, and elevate the surgical extremity.      Fitted and dispensed a postoperative shoe to facilitate healing and to offload the surgical site.     Instructed to take tylenol or ibuprofen for postoperative pain.     RTC in 1 week for follow up.     Abhinav Santos DPM

## 2017-06-20 NOTE — PROCEDURES
Nail Removal  Date/Time: 6/20/2017 10:27 AM  Performed by: ATILIO XAVEIR  Authorized by: ATILIO XAVIER       Location:  Right foot  Anesthesia:  Digital block  Local anesthetic: lidocaine 2% without epinephrine  Anesthetic total (ml):  6  Preparation:  Skin prepped with alcohol and skin prepped with Betadine    Amount removed:  Complete  Wedge excision of skin of nail fold: No    Nail bed sutured?: No    Nail matrix removed:  None  Removed nail replaced and anchored: No    Dressing applied:  4x4, antibiotic ointment and dressing applied  Patient tolerance:  Patient tolerated the procedure well with no immediate complications

## 2017-06-20 NOTE — PATIENT INSTRUCTIONS
"POST NAIL PROCEDURE INSTRUCTIONS    1. Leave bandage intact for 12-24 hours. If the bandage sticks as you try to remove it, soak it in warm water until it lifts off.    2. Wash the toe with antibacterial soap and water separate from the body.    3. Dry foot completely after washing, and apply a small amount of triple antibiotic ointment (neosporin works fine!) and a fabric or cloth bandaid ("plastic" bandaids tend to lift off with ointment use).  Wear open-toed shoes as needed for comfort.     4. Take Advil or Tylenol as needed for pain.     5. Your toe may drain for the next few days. Normal drainage is yellow-to-pink, and clear, much like the fluid in a blister. Watch for redness spreading up your toe into your foot, white thick drainage (pus), pain unrelieved by medication, or nausea/vomiting/fever/chills. These are signs of infection. Please call the clinic or visit your doctor.      "

## 2017-06-20 NOTE — LETTER
June 20, 2017      Pk Lakhani MD  [address]  [place] [address]           [facility]  [address]  [place] [address]  Phone: [phone]          Patient: Micheal Hunt   MR Number: [id number]   YOB: 1940   Date of Visit: 6/20/2017       Dear Dr. Pk Lakhani:    Thank you for referring Micheal Hunt to me for evaluation. Attached you will find relevant portions of my assessment and plan of care.    If you have questions, please do not hesitate to call me. I look forward to following Micheal Hunt along with you.    Sincerely,    Abhinav Santos, MISA    Enclosure  CC:  No Recipients    If you would like to receive this communication electronically, please contact [email or web address] or [phone] to request more information on [facility] access.    For providers and/or their staff who would like to refer a patient to [facility], please contact us through our one-stop-shop provider referral line, [facility], at [phone].    If you feel you have received this communication in error or would no longer like to receive these types of communications, please e-mail [email or web address]

## 2017-06-20 NOTE — PROGRESS NOTES
"SUBJECTIVE:  Micheal Hunt is a 76 y.o. male who sustained a right toenail injury several  week(s) ago. Mechanism of injury: soft tissue injury, partial results with self toe nail clipping.. Immediate symptoms: immediate pain, delayed swelling, inability to bear weight directly after injury, deformity was immediately noted. Symptoms have been chronic since that time. Prior history of related problems: no prior problems with this area in the past.  Patient Active Problem List   Diagnosis    Osteoarthritis of right knee, L-TKR 10/2012    Nocturia    Hematuria    BPH (benign prostatic hyperplasia)    Carotid stenosis    HTN (hypertension),     Hyperlipidemia, baseline     CAD (coronary artery disease), 2V CABG 2007    Gout, arthritis    Obesity, Class I, BMI 32.8 to 34.3, 32.7, down to 32.1    Vertigo    Tinea pedis    LVH (left ventricular hypertrophy) due to hypertensive disease    Abnormal cardiovascular stress test    CKD (chronic kidney disease), stage III, eGFR 40, 7/2014    GERD (gastroesophageal reflux disease)    Elevated PSA    Sleep disorder    Acquired hammer toe    Diastolic dysfunction    Abdominal obesity    Back pain, chronic    Glucose intolerance (impaired glucose tolerance)    Anemia, mild    Gastric nodule    Diverticulitis, 2005, 2015    Personal history of colonic polyps    PSA elevation    DDD (degenerative disc disease), lumbar    LV dysfunction, EF 50%, reduced to 34%    Other spondylosis, lumbar region    Knee pain    NICK (acute kidney injury)       OBJECTIVE:/71 (BP Location: Right arm, Patient Position: Sitting, BP Method: Automatic)   Pulse (!) 59   Temp 98.2 °F (36.8 °C) (Oral)   Ht 6' 1" (1.854 m) Comment: verified  Wt 111.3 kg (245 lb 6 oz)   BMI 32.37 kg/m²     Vital signs as noted above.  ENT exam reveals - bilateral TM normal without fluid or infection, neck without nodes, pharynx erythematous without exudate,  sinus tender, " post nasal drip noted and nasal mucosa congested.  Chest: clear to auscultation, no wheezes, rales or rhonchi, symmetric air entry, no tachypnea, retractions or cyanosis.       Appearance: alert, well appearing, and in no distress, oriented to person, place, and time and overweight.  Foot/ankle exam: FROM onychogryphosis.moderate swelling, tenderness, warm.      ASSESSMENT:   Essential hypertension    Paronychia, right  -     doxycycline (VIBRAMYCIN) 100 MG Cap; Take 1 capsule (100 mg total) by mouth every 12 (twelve) hours.  Dispense: 20 capsule; Refill: 1  -     Ambulatory referral to Podiatry  -     Ambulatory Referral to Physical/Occupational Therapy    Asthma with allergic rhinitis, mild persistent, uncomplicated  -     doxycycline (VIBRAMYCIN) 100 MG Cap; Take 1 capsule (100 mg total) by mouth every 12 (twelve) hours.  Dispense: 20 capsule; Refill: 1  -     azelastine (ASTELIN) 137 mcg (0.1 %) nasal spray; USE 2 SPRAYS TWICE DAILY IN EACH NOSTRIL  Dispense: 30 mL; Refill: 5  -     budesonide-formoterol 160-4.5 mcg (SYMBICORT) 160-4.5 mcg/actuation HFAA; Inhale 1 puff into the lungs every 12 (twelve) hours. Controller  Dispense: 2 Inhaler; Refill: 3  -     promethazine-codeine 6.25-10 mg/5 ml (PHENERGAN WITH CODEINE) 6.25-10 mg/5 mL syrup; Take 5 mLs by mouth every 6 (six) hours as needed for Cough.  Dispense: 180 mL; Refill: 1          PLAN:Patient readiness: acceptance and barriers:readiness    During the course of the visit the patient was educated and counseled about the following:     Hypertension:   Dietary sodium restriction.  Regular aerobic exercise.  Obesity:   General weight loss/lifestyle modification strategies discussed (elicit support from others; identify saboteurs; non-food rewards, etc).    Goals: Hypertension: Reduce Blood Pressure and Obesity: Reduce calorie intake and BMI    Did patient meet goals/outcomes: Yes    The following self management tools provided: blood pressure log  excercise  log    Patient Instructions (the written plan) was given to the patient/family.     Time spent with patient: 30 minutes            rest the injured area as much as practical  See orders for this visit as documented in the electronic medical record.

## 2017-06-20 NOTE — PATIENT INSTRUCTIONS
Diet for Chronic Kidney Disease  Following a special diet when you have kidney disease can help you stay as healthy as possible. Your healthcare provider or dietitian should make a special diet plan just for you.    Eating right  Here are some good eating rules to follow:  · Protein. Eating protein is important for your body. But too much protein can put a strain on your kidneys. Eating less protein may slow the progression of chronic kidney disease. Foods high in protein include meat, fish, eggs, cheese, and other dairy products. A registered dietitian can help you plan a diet that has the right amount of protein for you.  · Sodium. Having too much salt in your diet can make your body hold onto (retain) water. Ask your provider or dietitian how much sodium per day you are allowed. This will help you avoid fluid buildup in your body (fluid retention). It can also help control high blood pressure. Learn to read food labels to know how much sodium is in one serving. Foods high in salt include processed meats, canned and boxed foods, sauces, salted chips and snacks, pickled foods, frozen dinners, and restaurant and fast food.  · Fluids. If you have advanced kidney disease, you will need to limit the water and fluids you drink. If you dont, then too much water will build up in your body. The exact amount of fluid you can drink depends on how well your kidneys are working. Ask your provider how much water you can safely drink each day.  · Potassium. In advanced kidney disease, your potassium level can go dangerously high. This affects your heart. It can cause an irregular heartbeat (arrhythmia). Ask your provider or dietitian if you should limit potassium in your diet. Foods high in potassium include dairy products (milk, yogurt, cheese), dried beans, bananas, oranges, potatoes, tomatoes, spinach, cantaloupe, honeydew melon, dried fruits, and nuts.   · Calcium. Calcium is important to build strong bones. But foods  high in calcium are also high in phosphorus, which can take calcium from your bones. Limiting foods high in phosphorus will help keep calcium in your bones. Ask your provider how much calcium you should get each day.  · Phosphorus. In advanced kidney disease, your phosphorus level can go dangerously high. This affects many systems in the body and can damage your heart. Limit your intake of phosphorus-rich foods. These include dried beans and peas, nuts, peanut butter, cocoa, beer, cola drinks, and dairy products.  Date Last Reviewed: 8/1/2016  © 4927-1239 [employer]. [address] [place] [address]. All rights reserved. This information is not intended as a substitute for professional medical care. Always follow your healthcare professional's instructions.        Kidney Disease: Taking Iron for Anemia  Your body needs plenty of iron to make red blood cells. Because of this, most people who take epoetin kitty (EPO) need extra iron. EPO is the injection that people with kidney disease take to boost their red cells. Iron can be given in pill form or by IV. Some people receive it both ways.     If you take iron pills, you may need extra fiber in your diet to prevent constipation. Good sources of fiber include fresh foods that are low in potassium.     Take iron as directed  If you take iron pills, follow these tips:  · Take iron pills as often as directed. Do not skip doses.  · Take iron between meals if you use phosphate binders at mealtime. If taken together, the iron can get caught by the binder and won't be absorbed.   · Eat high-fiber foods to help control constipation. Fresh fruits and vegetables that are low in potassium make good choices.  · Ask your health care provider about using a stool softener if constipation becomes a frequent problem.  · Talk with your health care provider if you begin to have stomach or intestinal upset.  · Iron pills can make your stools dark or black colored.  This does not mean your health is affected.  Date Last Reviewed: 12/2/2014  © 4123-3569 [employer]. [address] [place] [address], [place] [address]. All rights reserved. This information is not intended as a substitute for professional medical care. Always follow your healthcare professional's instructions.        Kidney Disease: Choosing the Right Protein for Your Body  Choosing the right type and quantity of proteins you eat is important when you have chronic kidney disease. This can affect your overall health and kidney function we well.    Choosing the right amount of protein  If you have kidney disease but are not yet on dialysis, you will most likely need a low-protein diet. This is important in slowing down the speed at which your kidneys are failing. The amount of protein you can eat daily will be calculated by the dietitian in the kidney clinic. It is based on your body weight, the degree of kidney failure, and your daily activities.  Eat your daily protein  The amount of protein that you can eat each day may change with time. Your healthcare provider determines your protein intake according to the stage of your kidney disease.  Your body weight is also a factor. If your protein intake is decreased, you may need to eat more calories from other types of food. Carbohydrates, such as bread and pasta, make good choices.  · I can eat _____ grams of protein each day.  · I should eat a total of _____ calories each day to maintain my body weight and muscle mass.  Choosing the right type of protein  People with kidney failure have to limit the amount of phosphorus in the diet. Unfortunately, many proteins contain high amounts of phosphorus and may have to be taken in limited amounts. Milk products are a good example because they have a lot of phosphorus. The choices of protein may also be limited because of cultural, Yazdanism, and social values. Vegetarian, vegan, kosher, and halal diets are all good  examples of diets with limited protein choices. Dietitians in the clinic can work out the protein types and amounts to suit your needs.  Date Last Reviewed: 1/2/2015 © 2000-2016 [employer]. [address] [place] [address], [place] [address]. All rights reserved. This information is not intended as a substitute for professional medical care. Always follow your healthcare professional's instructions.        Kidney Disease: Eating a Safe Amount of Potassium  Potassium is a mineral found in many foods. The body needs some potassium to keep the heart working normally. But if your kidneys dont work well, potassium can build up in your blood. It can be serious and even deadly if the levels go up too high. By controlling the amount of potassium you eat, you can keep a safe level in your blood.  Using this guide  Use this serving guide along with the food list below. Always follow your dietitians instructions on the number and size of servings to eat. You can looney some of the potassium out of some high potassium foods by soaking and cooking them in large amounts of water. Ask your dietitian about how to do this. Also, talk with your dietitian before eating foods that arent on this list.  · ___ daily servings of foods that have high potassium content (250 mg to 500 mg per serving).  · ___ daily servings of foods that have medium potassium content (150 mg to 250 mg per serving).  · ___ daily servings of foods that have low potassium content (5 mg to 150 mg per serving).  · You can substitute food choices in the following way:  Potassium content of some foods      Vegetable Fruit Starches   High   Artichokes (1)  Bok angel luis (½ cup)  Spinach (½ cup)  Tomatoes (½ cup) Bananas (1)  Cataloupe or honeydew  (½ melon)  Oranges (1)  Peaches, fresh (1) Beans, dried (½ cup)  Lentils (½ cup)  Potatoes (½ cup  or 1 small)  Winter squash,  yams (½ cup)   Medium   Broccoli (½ cup)  Carrots (½ cup)  Eggplant (½ cup)  Peppers (1)  "Apples (1)  Cherries (½ cup)  Peaches, canned (½ cup)  Pears, fresh (½ cup) Bread, pumpernickel  (1 slice)  Chickpeas, cooked (½ cup)  Corn, fresh (½ cup)  Tortillas, corn (4 small)   Low   Asparagus (4 graves)  Green beans (½ cup)  Cauliflower (½ cup)  Cucumbers (½)    Blueberries (1 cup)  Grapefruit (½ cup)  Grapes (½ cup)  Strawberries (½ cup)  Watermelon (½ cup) Bagel, plain (1)  Bread, white (2 slices)  Oatmeal (¾ cup)  Pasta, plain (1 cup)  Rice, white (1 cup)   Date Last Reviewed: 12/3/2014  © 6291-4709 [employer]. [address] [place] [address]. All rights reserved. This information is not intended as a substitute for professional medical care. Always follow your healthcare professional's instructions.        Kidney Disease: Eating Less Sodium  Sodium is a mineral that the body needs in small amounts. Sodium is found in table salt. Most people eat far more salt than they need. There are two main reasons for this: Salt is present in high amounts in most processed foods (pre-prepared foods like breakfast cereals, cookies, and pickles) and in all restaurant foods. In other words, if you are not cooking from fresh ingredients at home, you are very likely eating more salt than you need. When sodium intake is too high, it can increase thirst and cause the body to retain fluid. To avoid these side effects, people with chronic kidney disease are often told to eat less sodium. The tips on this sheet can show you how.  People with chronic kidney disease should restrict their salt intake to less than 1,500 milligrams (mg) of sodium daily. Food labels generally report the sodium content. Table salt is "sodium chloride." One level teaspoon of table salt contains 2.300 mg of sodium.    When you shop for food  Unlike canned and processed foods, fresh foods have no added salt and are better for you. When youre food shopping:  · Choose fresh foods when you can.  · Read food labels before buying " packaged foods. Check the labels nutrition facts for sodium amounts and servings per package.  · Try to pick packaged foods with a sodium content of 140 mg (milligrams) or less per serving.  · Do not choose foods with over 400 mg of sodium per serving.  Season instead of salt  Try the seasonings and foods listed below to season without sodium.  · Basil: tomatoes, squash, eggplant, soups, fish  · Bryan: soups, rice, lentils, chicken  · Dill: beets, cucumbers, green beans  · Garlic: sauces, vegetables, meats, fish  · Yeni: carrots, chicken, cooked fruit, white sauces  · Lemon: asparagus, artichokes, broccoli, spinach, fish  · Mint: cold soups, salads, fruit dishes  · Oregano: eggplant, chicken, salads, sauces  · Thyme: chicken, fish, lean meats, soups, stews  Food that has salt added during cooking tastes less salty than if salt is sprinkled on it at the table. Therefore, use half the amount of salt you want to have in your food during cooking, and sprinkle the other half on the food at the table.  Do not use seasoned salt or salt substitutes. They may contain sodium or potassium (another mineral people with kidney disease are often told to limit).  Date Last Reviewed: 1/5/2015  © 8325-9383 [employer]. [address] [place] [address]. All rights reserved. This information is not intended as a substitute for professional medical care. Always follow your healthcare professional's instructions.        Kidney Disease: Getting the Right Amount of Protein     One portion (3 to 4 ounces) of fish, chicken, or red meat is about the size of a deck of playing cards.   Your body needs protein to build and repair muscles and bones along with other important body functions. But as the body uses protein, a waste product (blood urea nitrogen or BUN) is produced. If your kidneys cant filter wastes from your blood normally, the BUN level increases. If the level gets too high, you can become sick. Because of  this, you need to control the amount of protein you eat each day. Use this handout to help you.     Measuring protein content  You know how many grams of protein to eat, but most food portions are measured in ounces. Use the chart below to help determine the protein content of some common foods.  Chicken breast 3 to 4 ounces 21 to 28 grams   Chicken thigh 2 to 2.5 ounces 14 to 18 grams   Fish 3 ounces 21 grams   Pork chop 2 to 2.5 ounces 14 to18 grams   Roast beef 3 ounces 21 grams   Steak 3 to 4 ounces 21 to 28 grams   Hamburger 3 to 4 ounces 21 to 28 grams   Eggs 1 egg 7 grams   Cheese 1 ounce 7 grams   Most beans 4 ounces 7 to 10 grams   Tofu 2 ounces 5 grams   Most nuts 2 ounces 5 to 8 grams      If you eat too much protein  Eating too much protein may cause the following:     · Nausea, vomiting  · Fatigue  · Mental confusion  · Increased potassium levels  · Increased phosphorus levels   · Increased time on hemodialysis  · Risk of speeding the loss of kidney function  If you eat too little protein  Eating too little protein may cause the following:  · Muscle loss, weakness  · Fatigue  · Weight loss  · Slower wound healing      Date Last Reviewed: 1/2/2015  © 2626-3489 [employer]. [address] [place] [address], [place] [address]. All rights reserved. This information is not intended as a substitute for professional medical care. Always follow your healthcare professional's instructions.

## 2017-06-27 ENCOUNTER — OFFICE VISIT (OUTPATIENT)
Dept: PODIATRY | Facility: CLINIC | Age: 77
End: 2017-06-27
Payer: MEDICARE

## 2017-06-27 VITALS — BODY MASS INDEX: 31.7 KG/M2 | WEIGHT: 239.19 LBS | HEIGHT: 73 IN

## 2017-06-27 DIAGNOSIS — Z98.890 STATUS POST NAIL SURGERY: Primary | ICD-10-CM

## 2017-06-27 PROCEDURE — 99024 POSTOP FOLLOW-UP VISIT: CPT | Mod: ,,, | Performed by: PODIATRIST

## 2017-06-27 PROCEDURE — 99999 PR PBB SHADOW E&M-EST. PATIENT-LVL III: CPT | Mod: PBBFAC,,, | Performed by: PODIATRIST

## 2017-06-27 PROCEDURE — 99213 OFFICE O/P EST LOW 20 MIN: CPT | Mod: PBBFAC,PO | Performed by: PODIATRIST

## 2017-06-27 NOTE — PROGRESS NOTES
Subjective:      Patient ID: Micheal Hunt is a 76 y.o. male.    Chief Complaint: Post-op Evaluation (Nail removal f/u , Rt Gr toe)  Patient presents to clinic 1 week S/P total avulsion of the Rt. Hallux nail plate.  Denies pain to the affected digit with today's exam.  Has been keeping the site covered with neosporin and a bandage daily.  Relates ambulation to tolerance in open toe shoes without issue.  Denies having N/V/F/C/D.  Denies any additional pedal complaints.       Past Medical History:   Diagnosis Date    Allergy     Arthritis     Gout    BPH (benign prostatic hyperplasia)     CAD (coronary artery disease) 2006    Chronic kidney disease     due to ibuprofen    Colon polyp     Diverticulosis     GERD (gastroesophageal reflux disease)     HTN (hypertension) 3/27/2012    Hyperlipidemia     LVH (left ventricular hypertrophy)     Murmur, cardiac 3/27/2012    GRICELDA (obstructive sleep apnea)     DOES NOT USE A MACHINE    Sinus problem     Syncope and collapse        Past Surgical History:   Procedure Laterality Date    APPENDECTOMY      CARDIAC CATHETERIZATION      COLONOSCOPY  2011    CORONARY ARTERY BYPASS GRAFT  4/2007    x 1    JOINT REPLACEMENT      left knee total replacement  X 3    mid leftt finger      from a cactuss    MOLE REMOVAL  2016    rotative cuff      no rotative cuffs on bilat shoulders has pins     SHOULDER SURGERY      shoulder surgery bilat  RIGHT X 4; LEFT X 3       Family History   Problem Relation Age of Onset    Heart disease Mother     Sudden death Father     Stroke Sister     Heart disease Sister     Kidney cancer Neg Hx     Prostate cancer Neg Hx     Urolithiasis Neg Hx     Allergic rhinitis Neg Hx     Allergies Neg Hx     Angioedema Neg Hx     Asthma Neg Hx     Atopy Neg Hx     Eczema Neg Hx     Immunodeficiency Neg Hx     Rhinitis Neg Hx     Urticaria Neg Hx        Social History     Social History    Marital status:       Spouse name: N/A    Number of children: N/A    Years of education: N/A     Social History Main Topics    Smoking status: Never Smoker    Smokeless tobacco: Never Used    Alcohol use Yes      Comment: rare    Drug use: No    Sexual activity: Not on file     Other Topics Concern    Not on file     Social History Narrative    No narrative on file       Current Outpatient Prescriptions   Medication Sig Dispense Refill    albuterol-ipratropium 2.5mg-0.5mg/3mL (DUO-NEB) 0.5 mg-3 mg(2.5 mg base)/3 mL nebulizer solution Take 3 mLs by nebulization every 6 (six) hours as needed for Wheezing. Rescue 30 vial 0    allopurinol (ZYLOPRIM) 300 MG tablet Take 1 tablet (300 mg total) by mouth once daily. 90 tablet 3    amlodipine (NORVASC) 10 MG tablet Take 1 tablet (10 mg total) by mouth before dinner. 90 tablet 3    aspirin (ECOTRIN) 81 MG EC tablet Take 81 mg by mouth once daily.        atorvastatin (LIPITOR) 80 MG tablet Take 1 tablet (80 mg total) by mouth once daily. 90 tablet 3    azelastine (ASTELIN) 137 mcg (0.1 %) nasal spray USE 2 SPRAYS TWICE DAILY IN EACH NOSTRIL 30 mL 5    budesonide-formoterol 160-4.5 mcg (SYMBICORT) 160-4.5 mcg/actuation HFAA Inhale 1 puff into the lungs every 12 (twelve) hours. Controller 2 Inhaler 3    calcium-vitamin D 500-125 mg-unit tablet Take 1 tablet by mouth as needed.       carvedilol (COREG) 6.25 MG tablet Take 1 tablet (6.25 mg total) by mouth 2 (two) times daily with meals. 90 tablet 3    colchicine 0.6 mg tablet Take 1 tablet (0.6 mg total) by mouth 2 (two) times daily. 10 tablet 3    cyanocobalamin, vitamin B-12, (VITAMIN B-12) 1,000 mcg Subl Take 1 tablet by mouth daily as needed. Takes 3,000mcg      doxycycline (VIBRAMYCIN) 100 MG Cap Take 1 capsule (100 mg total) by mouth every 12 (twelve) hours. 20 capsule 1    finasteride (PROSCAR) 5 mg tablet TAKE 1 TABLET ONCE DAILY. 90 tablet 3    fluticasone (FLONASE) 50 mcg/actuation nasal spray 1 spray by Each Nare  route once daily. 1 Bottle 0    inhalation device (AEROCHAMBER PLUS FLOW-VU) Use as directed for inhalation. 1 Device 0    meclizine (ANTIVERT) 25 mg tablet       omeprazole (PRILOSEC) 40 MG capsule Take 1 capsule (40 mg total) by mouth 2 (two) times daily before meals. 90 capsule 0    promethazine-codeine 6.25-10 mg/5 ml (PHENERGAN WITH CODEINE) 6.25-10 mg/5 mL syrup Take 5 mLs by mouth every 6 (six) hours as needed for Cough. 180 mL 1    terazosin (HYTRIN) 5 MG capsule Take 1 capsule (5 mg total) by mouth every evening. 90 capsule 0    tramadol (ULTRAM) 50 mg tablet Take 1 tablet (50 mg total) by mouth every 6 (six) hours as needed. 120 tablet 4    valsartan (DIOVAN) 320 MG tablet Take 1 tablet (320 mg total) by mouth once daily. 90 tablet 3    nitroGLYCERIN (NITROSTAT) 0.4 MG SL tablet Place 1 tablet (0.4 mg total) under the tongue every 5 (five) minutes as needed for Chest pain. 25 tablet 4     No current facility-administered medications for this visit.        Review of patient's allergies indicates:   Allergen Reactions    Eplerenone Other (See Comments)     Marked bradycardia, 40, tiredness and weakness           Review of Systems   Constitution: Negative for chills and fever.   Cardiovascular: Negative for claudication.   Skin: Positive for color change and nail changes.   Musculoskeletal: Positive for arthritis. Negative for joint swelling.   Neurological: Negative for numbness and paresthesias.           Objective:      Physical Exam   Constitutional: He is oriented to person, place, and time. He appears well-developed and well-nourished. No distress.   Cardiovascular:   Pulses:       Dorsalis pedis pulses are 2+ on the right side, and 2+ on the left side.        Posterior tibial pulses are 2+ on the right side, and 2+ on the left side.   CFT <3 seconds bilateral.  Pedal hair growth present bilateral.   No varicosities noted bilateral.  No bilateral lower extremity edema.   Musculoskeletal: He  exhibits no edema or tenderness.   Muscle strength 5/5 in all muscle groups bilateral.  No tenderness nor crepitation with ROM of foot/ankle joints bilateral.  No pain with palpation of the Rt. Hallux nail plate.  Bilateral pes planus foot type.  Bilateral hallux abducto valgus.  Bilateral semi-reducible contracture of toes 2-5.      Neurological: He is alert and oriented to person, place, and time. He has normal strength. No sensory deficit.   Light touch intact bilateral.     Skin: Skin is warm, dry and intact. Capillary refill takes 2 to 3 seconds. No abrasion, no bruising, no burn, no ecchymosis, no laceration, no lesion, no petechiae and no rash noted. He is not diaphoretic. No erythema. No pallor.   Pedal skin has normal turgor, temperature, and texture bilateral.  Absence of the nail plate with maturing epithelium noted to the exposed nail bed of the Rt. Hallux.  No adjacent signs of infection noted.  Remaining toenails x 9 appear normotrophic. Examination of the skin reveals no evidence of significant maceration, rashes, open lesions, suspicious appearing nevi or other concerning lesions.              Assessment:       Encounter Diagnosis   Name Primary?    Status post nail surgery Yes         Plan:       Micheal was seen today for post-op evaluation.    Diagnoses and all orders for this visit:    Status post nail surgery      I counseled the patient on his conditions, their implications and medical management.    Advised to continue applying a bandage x 1 week to the toe to allow the site to further mature.    May begin showering per normal routine and weight bearing activity to tolerance.       May ambulate to tolerance in casual shoes.     RTC prn.     Abhinav Santos DPM

## 2017-06-29 ENCOUNTER — LAB VISIT (OUTPATIENT)
Dept: LAB | Facility: HOSPITAL | Age: 77
End: 2017-06-29
Attending: INTERNAL MEDICINE
Payer: MEDICARE

## 2017-06-29 ENCOUNTER — OFFICE VISIT (OUTPATIENT)
Dept: PHYSICAL MEDICINE AND REHAB | Facility: CLINIC | Age: 77
End: 2017-06-29
Payer: MEDICARE

## 2017-06-29 VITALS
WEIGHT: 239.06 LBS | SYSTOLIC BLOOD PRESSURE: 122 MMHG | DIASTOLIC BLOOD PRESSURE: 75 MMHG | HEART RATE: 62 BPM | BODY MASS INDEX: 31.68 KG/M2 | HEIGHT: 73 IN

## 2017-06-29 DIAGNOSIS — M10.9 GOUT, UNSPECIFIED: ICD-10-CM

## 2017-06-29 DIAGNOSIS — M17.0 PRIMARY OSTEOARTHRITIS OF BOTH KNEES: Primary | ICD-10-CM

## 2017-06-29 DIAGNOSIS — R53.83 FATIGUE: ICD-10-CM

## 2017-06-29 DIAGNOSIS — I10 ESSENTIAL HYPERTENSION, MALIGNANT: ICD-10-CM

## 2017-06-29 DIAGNOSIS — M19.90 LOCALIZED OSTEOARTHROSIS NOT SPECIFIED WHETHER PRIMARY OR SECONDARY, UNSPECIFIED SITE: ICD-10-CM

## 2017-06-29 DIAGNOSIS — N18.4 CHRONIC KIDNEY DISEASE, STAGE IV (SEVERE): ICD-10-CM

## 2017-06-29 DIAGNOSIS — I25.810 CORONARY ATHEROSCLEROSIS OF AUTOLOGOUS VEIN BYPASS GRAFT: Primary | ICD-10-CM

## 2017-06-29 DIAGNOSIS — N40.0 HYPERTROPHY OF PROSTATE WITHOUT URINARY OBSTRUCTION AND OTHER LOWER URINARY TRACT SYMPTOMS (LUTS): ICD-10-CM

## 2017-06-29 DIAGNOSIS — N18.30 CHRONIC KIDNEY DISEASE, STAGE III (MODERATE): ICD-10-CM

## 2017-06-29 LAB
ALBUMIN SERPL BCP-MCNC: 3.5 G/DL
ALP SERPL-CCNC: 97 U/L
ALT SERPL W/O P-5'-P-CCNC: 17 U/L
ANION GAP SERPL CALC-SCNC: 7 MMOL/L
AST SERPL-CCNC: 25 U/L
BASOPHILS # BLD AUTO: 0 K/UL
BASOPHILS NFR BLD: 0.4 %
BILIRUB SERPL-MCNC: 0.5 MG/DL
BUN SERPL-MCNC: 42 MG/DL
CALCIUM SERPL-MCNC: 8.8 MG/DL
CHLORIDE SERPL-SCNC: 111 MMOL/L
CO2 SERPL-SCNC: 23 MMOL/L
CREAT SERPL-MCNC: 3.2 MG/DL
CREAT UR-MCNC: 212.6 MG/DL
DIFFERENTIAL METHOD: ABNORMAL
EOSINOPHIL # BLD AUTO: 0.1 K/UL
EOSINOPHIL NFR BLD: 1.7 %
ERYTHROCYTE [DISTWIDTH] IN BLOOD BY AUTOMATED COUNT: 14.9 %
EST. GFR  (AFRICAN AMERICAN): 20 ML/MIN/1.73 M^2
EST. GFR  (NON AFRICAN AMERICAN): 18 ML/MIN/1.73 M^2
GLUCOSE SERPL-MCNC: 99 MG/DL
HCT VFR BLD AUTO: 33.2 %
HGB BLD-MCNC: 11.3 G/DL
LYMPHOCYTES # BLD AUTO: 1.9 K/UL
LYMPHOCYTES NFR BLD: 25.3 %
MCH RBC QN AUTO: 31.2 PG
MCHC RBC AUTO-ENTMCNC: 34 %
MCV RBC AUTO: 92 FL
MONOCYTES # BLD AUTO: 0.4 K/UL
MONOCYTES NFR BLD: 5.5 %
NEUTROPHILS # BLD AUTO: 5 K/UL
NEUTROPHILS NFR BLD: 67.1 %
PLATELET # BLD AUTO: 149 K/UL
PMV BLD AUTO: 8.1 FL
POTASSIUM SERPL-SCNC: 4.1 MMOL/L
PROT SERPL-MCNC: 6.9 G/DL
PROT UR-MCNC: 541 MG/DL
PROT/CREAT RATIO, UR: 2.54
PTH-INTACT SERPL-MCNC: 198.1 PG/ML
RBC # BLD AUTO: 3.62 M/UL
SODIUM SERPL-SCNC: 141 MMOL/L
WBC # BLD AUTO: 7.4 K/UL

## 2017-06-29 PROCEDURE — 99213 OFFICE O/P EST LOW 20 MIN: CPT | Mod: PBBFAC,PN | Performed by: PHYSICAL MEDICINE & REHABILITATION

## 2017-06-29 PROCEDURE — 36415 COLL VENOUS BLD VENIPUNCTURE: CPT

## 2017-06-29 PROCEDURE — 80053 COMPREHEN METABOLIC PANEL: CPT

## 2017-06-29 PROCEDURE — 99999 PR PBB SHADOW E&M-EST. PATIENT-LVL III: CPT | Mod: PBBFAC,,, | Performed by: PHYSICAL MEDICINE & REHABILITATION

## 2017-06-29 PROCEDURE — 99214 OFFICE O/P EST MOD 30 MIN: CPT | Mod: 25,S$PBB,, | Performed by: PHYSICAL MEDICINE & REHABILITATION

## 2017-06-29 PROCEDURE — 85025 COMPLETE CBC W/AUTO DIFF WBC: CPT

## 2017-06-29 PROCEDURE — 1159F MED LIST DOCD IN RCRD: CPT | Mod: ,,, | Performed by: PHYSICAL MEDICINE & REHABILITATION

## 2017-06-29 PROCEDURE — 64450 NJX AA&/STRD OTHER PN/BRANCH: CPT | Mod: S$PBB,RT,, | Performed by: PHYSICAL MEDICINE & REHABILITATION

## 2017-06-29 PROCEDURE — 82570 ASSAY OF URINE CREATININE: CPT

## 2017-06-29 PROCEDURE — 76942 ECHO GUIDE FOR BIOPSY: CPT | Mod: PBBFAC,PN | Performed by: PHYSICAL MEDICINE & REHABILITATION

## 2017-06-29 PROCEDURE — 64450 NJX AA&/STRD OTHER PN/BRANCH: CPT | Mod: PBBFAC,PN | Performed by: PHYSICAL MEDICINE & REHABILITATION

## 2017-06-29 PROCEDURE — 83970 ASSAY OF PARATHORMONE: CPT

## 2017-06-29 PROCEDURE — 76942 ECHO GUIDE FOR BIOPSY: CPT | Mod: 26,S$PBB,, | Performed by: PHYSICAL MEDICINE & REHABILITATION

## 2017-06-29 PROCEDURE — 1125F AMNT PAIN NOTED PAIN PRSNT: CPT | Mod: ,,, | Performed by: PHYSICAL MEDICINE & REHABILITATION

## 2017-06-29 RX ORDER — LIDOCAINE HYDROCHLORIDE 10 MG/ML
10 INJECTION INFILTRATION; PERINEURAL ONCE
Status: COMPLETED | OUTPATIENT
Start: 2017-06-29 | End: 2017-06-29

## 2017-06-29 RX ADMIN — LIDOCAINE HYDROCHLORIDE 10 ML: 10 INJECTION INFILTRATION; PERINEURAL at 11:06

## 2017-06-29 NOTE — PROGRESS NOTES
HPI:  Patient is a 76 y.o. year old male  W. B/l knee pain. Last eval I did synvisc to his right knee , pain did not improve may have gotten worse. Today's pain is wose on the inside of knee. I did saphenous nerve block on the left knee where he is s/p total left knee replacement w. Very good results. However, he states pain may be returning.    Imaging    Comparison: 10/18/16    Technique: AP, lateral, oblique and sunrise views of the right knee.    Findings: There is tricompartmental joint space narrowing and osteophytosis in each knee, most severe involving the medial and patellofemoral compartments. A small right knee joint effusion is seen. Extensor mechanism enthesophyte formation is noted. No fracture or dislocation is seen. Diffuse atherosclerosis is seen.    Surgical clip noted in the medial aspect of the knee just below the joint line.   Impression       1.  Tricompartmental osteoarthritis in the right knee, similar compared to the 10/18/16 study. This is most severe in the medial and patellofemoral compartments. Small right knee joint effusion. No acute abnormality is seen     Labs  egfr 21, cr 3.1,. Gluc elev 113, lfts nl    Past Medical History:   Diagnosis Date    Allergy     Arthritis     Gout    BPH (benign prostatic hyperplasia)     CAD (coronary artery disease) 2006    Chronic kidney disease     due to ibuprofen    Colon polyp     Diverticulosis     GERD (gastroesophageal reflux disease)     HTN (hypertension) 3/27/2012    Hyperlipidemia     LVH (left ventricular hypertrophy)     Murmur, cardiac 3/27/2012    GRICELDA (obstructive sleep apnea)     DOES NOT USE A MACHINE    Sinus problem     Syncope and collapse      Past Surgical History:   Procedure Laterality Date    APPENDECTOMY      CARDIAC CATHETERIZATION      COLONOSCOPY  2011    CORONARY ARTERY BYPASS GRAFT  4/2007    x 1    JOINT REPLACEMENT      left knee total replacement  X 3    mid leftt finger      from a cactuss     MOLE REMOVAL  2016    rotative cuff      no rotative cuffs on bilat shoulders has pins     SHOULDER SURGERY      shoulder surgery bilat  RIGHT X 4; LEFT X 3     Family History   Problem Relation Age of Onset    Heart disease Mother     Sudden death Father     Stroke Sister     Heart disease Sister     Kidney cancer Neg Hx     Prostate cancer Neg Hx     Urolithiasis Neg Hx     Allergic rhinitis Neg Hx     Allergies Neg Hx     Angioedema Neg Hx     Asthma Neg Hx     Atopy Neg Hx     Eczema Neg Hx     Immunodeficiency Neg Hx     Rhinitis Neg Hx     Urticaria Neg Hx      Social History     Social History    Marital status:      Spouse name: N/A    Number of children: N/A    Years of education: N/A     Social History Main Topics    Smoking status: Never Smoker    Smokeless tobacco: Never Used    Alcohol use Yes      Comment: rare    Drug use: No    Sexual activity: Not on file     Other Topics Concern    Not on file     Social History Narrative    No narrative on file       Review of patient's allergies indicates:   Allergen Reactions    Eplerenone Other (See Comments)     Marked bradycardia, 40, tiredness and weakness         Current Outpatient Prescriptions:     albuterol-ipratropium 2.5mg-0.5mg/3mL (DUO-NEB) 0.5 mg-3 mg(2.5 mg base)/3 mL nebulizer solution, Take 3 mLs by nebulization every 6 (six) hours as needed for Wheezing. Rescue, Disp: 30 vial, Rfl: 0    allopurinol (ZYLOPRIM) 300 MG tablet, Take 1 tablet (300 mg total) by mouth once daily., Disp: 90 tablet, Rfl: 3    amlodipine (NORVASC) 10 MG tablet, Take 1 tablet (10 mg total) by mouth before dinner., Disp: 90 tablet, Rfl: 3    aspirin (ECOTRIN) 81 MG EC tablet, Take 81 mg by mouth once daily.  , Disp: , Rfl:     atorvastatin (LIPITOR) 80 MG tablet, Take 1 tablet (80 mg total) by mouth once daily., Disp: 90 tablet, Rfl: 3    azelastine (ASTELIN) 137 mcg (0.1 %) nasal spray, USE 2 SPRAYS TWICE DAILY IN EACH NOSTRIL,  Disp: 30 mL, Rfl: 5    budesonide-formoterol 160-4.5 mcg (SYMBICORT) 160-4.5 mcg/actuation HFAA, Inhale 1 puff into the lungs every 12 (twelve) hours. Controller, Disp: 2 Inhaler, Rfl: 3    calcium-vitamin D 500-125 mg-unit tablet, Take 1 tablet by mouth as needed. , Disp: , Rfl:     carvedilol (COREG) 6.25 MG tablet, Take 1 tablet (6.25 mg total) by mouth 2 (two) times daily with meals., Disp: 90 tablet, Rfl: 3    colchicine 0.6 mg tablet, Take 1 tablet (0.6 mg total) by mouth 2 (two) times daily., Disp: 10 tablet, Rfl: 3    cyanocobalamin, vitamin B-12, (VITAMIN B-12) 1,000 mcg Subl, Take 1 tablet by mouth daily as needed. Takes 3,000mcg, Disp: , Rfl:     doxycycline (VIBRAMYCIN) 100 MG Cap, Take 1 capsule (100 mg total) by mouth every 12 (twelve) hours., Disp: 20 capsule, Rfl: 1    finasteride (PROSCAR) 5 mg tablet, TAKE 1 TABLET ONCE DAILY., Disp: 90 tablet, Rfl: 3    fluticasone (FLONASE) 50 mcg/actuation nasal spray, 1 spray by Each Nare route once daily., Disp: 1 Bottle, Rfl: 0    inhalation device (AEROCHAMBER PLUS FLOW-VU), Use as directed for inhalation., Disp: 1 Device, Rfl: 0    meclizine (ANTIVERT) 25 mg tablet, , Disp: , Rfl:     omeprazole (PRILOSEC) 40 MG capsule, Take 1 capsule (40 mg total) by mouth 2 (two) times daily before meals., Disp: 90 capsule, Rfl: 0    promethazine-codeine 6.25-10 mg/5 ml (PHENERGAN WITH CODEINE) 6.25-10 mg/5 mL syrup, Take 5 mLs by mouth every 6 (six) hours as needed for Cough., Disp: 180 mL, Rfl: 1    terazosin (HYTRIN) 5 MG capsule, Take 1 capsule (5 mg total) by mouth every evening., Disp: 90 capsule, Rfl: 0    tramadol (ULTRAM) 50 mg tablet, Take 1 tablet (50 mg total) by mouth every 6 (six) hours as needed., Disp: 120 tablet, Rfl: 4    valsartan (DIOVAN) 320 MG tablet, Take 1 tablet (320 mg total) by mouth once daily., Disp: 90 tablet, Rfl: 3    nitroGLYCERIN (NITROSTAT) 0.4 MG SL tablet, Place 1 tablet (0.4 mg total) under the tongue every 5 (five)  minutes as needed for Chest pain., Disp: 25 tablet, Rfl: 4        Review of Systems  No nausea, vomiting, fevers, Chills , contipation, diarrhea or sweats      Physical Exam:      Vitals:    06/29/17 0855   BP: 122/75   Pulse: 62     alert and oriented ×4 follows commands answers all questions appropriately  Manual muscle test 5 out of 5 sensation to light touch grossly intact  Positive crepitus bilaterally knees  No knee laxity  Antalgic gait  No knee effusion no clubbing cyanosis or edema  Excruciate tenderness right medial joint line    Assessment:  Knee oa b/l s/p TKR on left  Right saphenous neuralgia  CKD  CAD  Plan:  Saphenous nerve block+hydrodissection on right  He would like to get it repeated on the left, will schedule this  Recommended prp for right knee- he is not interested      pROCEDURE NOTE: risk and benefit of right sAPHENOUS NERVE BLOCK AND HYDRODISEECTION injection reviewed with. patient. Verbal consent was obtained. Injection was performed after sterile prep w. Betadine. MEDIAL approach used. ALONG  EDGE OF VASTUS MEDIALIS MUSCLE. 25g 2 inch needle used hydrodissecting along vastus medialis facial plane   Ultrasound guidance was utilized for needle visualization. A TOTAL OF 10ML OF LIDOCAINE 1% AND 10ML OF STERILE NORMAL SALINE WAS UTILIZED. No complications. iMMEDIATE IMPROVEMENT OF KNEE PAIN POST PROCEDURE     Ultrasound interpretation was performed prior to the procedure to identify the target and any adjacent neurovascular structures.  Subsequently, interpretation was performed during real- time needle guidance confirming placement. Post- intervention interpretation was also performed confirming appropriate injectate flow and hemostasis.  Images indicating needle placement have been saved in [facility].

## 2017-07-17 DIAGNOSIS — J30.2 SEASONAL ALLERGIC RHINITIS: ICD-10-CM

## 2017-07-19 RX ORDER — FLUTICASONE PROPIONATE 50 MCG
SPRAY, SUSPENSION (ML) NASAL
Qty: 3 BOTTLE | Refills: 3 | Status: SHIPPED | OUTPATIENT
Start: 2017-07-19 | End: 2017-10-17

## 2017-07-24 ENCOUNTER — PATIENT MESSAGE (OUTPATIENT)
Dept: FAMILY MEDICINE | Facility: CLINIC | Age: 77
End: 2017-07-24

## 2017-07-25 ENCOUNTER — CLINICAL SUPPORT (OUTPATIENT)
Dept: PHYSICAL MEDICINE AND REHAB | Facility: CLINIC | Age: 77
End: 2017-07-25
Payer: MEDICARE

## 2017-07-25 VITALS
BODY MASS INDEX: 31.68 KG/M2 | HEART RATE: 71 BPM | SYSTOLIC BLOOD PRESSURE: 147 MMHG | DIASTOLIC BLOOD PRESSURE: 86 MMHG | WEIGHT: 239 LBS | HEIGHT: 73 IN

## 2017-07-25 DIAGNOSIS — M17.12 PRIMARY OSTEOARTHRITIS OF LEFT KNEE: Primary | ICD-10-CM

## 2017-07-25 PROCEDURE — 76942 ECHO GUIDE FOR BIOPSY: CPT | Mod: 26,S$PBB,, | Performed by: PHYSICAL MEDICINE & REHABILITATION

## 2017-07-25 PROCEDURE — 99213 OFFICE O/P EST LOW 20 MIN: CPT | Mod: 25,S$PBB,, | Performed by: PHYSICAL MEDICINE & REHABILITATION

## 2017-07-25 PROCEDURE — 99214 OFFICE O/P EST MOD 30 MIN: CPT | Mod: PBBFAC,PN | Performed by: PHYSICAL MEDICINE & REHABILITATION

## 2017-07-25 PROCEDURE — 76942 ECHO GUIDE FOR BIOPSY: CPT | Mod: PBBFAC,PN | Performed by: PHYSICAL MEDICINE & REHABILITATION

## 2017-07-25 PROCEDURE — 64450 NJX AA&/STRD OTHER PN/BRANCH: CPT | Mod: PBBFAC,PN | Performed by: PHYSICAL MEDICINE & REHABILITATION

## 2017-07-25 PROCEDURE — 99999 PR PBB SHADOW E&M-EST. PATIENT-LVL IV: CPT | Mod: PBBFAC,,, | Performed by: PHYSICAL MEDICINE & REHABILITATION

## 2017-07-25 PROCEDURE — 64450 NJX AA&/STRD OTHER PN/BRANCH: CPT | Mod: S$PBB,,, | Performed by: PHYSICAL MEDICINE & REHABILITATION

## 2017-07-25 RX ORDER — DICLOFENAC SODIUM 10 MG/G
2 GEL TOPICAL 2 TIMES DAILY
Qty: 2 TUBE | Refills: 3 | Status: ON HOLD | OUTPATIENT
Start: 2017-07-25 | End: 2018-02-27 | Stop reason: CLARIF

## 2017-08-07 ENCOUNTER — PATIENT MESSAGE (OUTPATIENT)
Dept: FAMILY MEDICINE | Facility: CLINIC | Age: 77
End: 2017-08-07

## 2017-08-07 DIAGNOSIS — R20.2 PARESTHESIA OF RIGHT FOOT: ICD-10-CM

## 2017-08-07 RX ORDER — GABAPENTIN 100 MG/1
CAPSULE ORAL
Qty: 150 CAPSULE | Refills: 5 | Status: SHIPPED | OUTPATIENT
Start: 2017-08-07 | End: 2017-09-05

## 2017-08-10 RX ORDER — MECLIZINE HYDROCHLORIDE 25 MG/1
TABLET ORAL
Qty: 90 TABLET | Refills: 6 | Status: SHIPPED | OUTPATIENT
Start: 2017-08-10 | End: 2017-12-19 | Stop reason: SDUPTHER

## 2017-08-15 RX ORDER — OMEPRAZOLE 40 MG/1
CAPSULE, DELAYED RELEASE ORAL
Qty: 90 CAPSULE | Refills: 0 | OUTPATIENT
Start: 2017-08-15

## 2017-08-18 ENCOUNTER — LAB VISIT (OUTPATIENT)
Dept: LAB | Facility: HOSPITAL | Age: 77
End: 2017-08-18
Attending: UROLOGY
Payer: MEDICARE

## 2017-08-18 ENCOUNTER — OFFICE VISIT (OUTPATIENT)
Dept: UROLOGY | Facility: CLINIC | Age: 77
End: 2017-08-18
Payer: MEDICARE

## 2017-08-18 VITALS
DIASTOLIC BLOOD PRESSURE: 82 MMHG | WEIGHT: 235 LBS | SYSTOLIC BLOOD PRESSURE: 148 MMHG | BODY MASS INDEX: 31.14 KG/M2 | HEIGHT: 73 IN | HEART RATE: 60 BPM | TEMPERATURE: 98 F

## 2017-08-18 DIAGNOSIS — N13.8 BPH WITH OBSTRUCTION/LOWER URINARY TRACT SYMPTOMS: Primary | ICD-10-CM

## 2017-08-18 DIAGNOSIS — N40.1 BPH WITH OBSTRUCTION/LOWER URINARY TRACT SYMPTOMS: ICD-10-CM

## 2017-08-18 DIAGNOSIS — N40.1 BPH WITH OBSTRUCTION/LOWER URINARY TRACT SYMPTOMS: Primary | ICD-10-CM

## 2017-08-18 DIAGNOSIS — N13.8 BPH WITH OBSTRUCTION/LOWER URINARY TRACT SYMPTOMS: ICD-10-CM

## 2017-08-18 LAB
BILIRUB SERPL-MCNC: ABNORMAL MG/DL
BLOOD URINE, POC: ABNORMAL
COLOR, POC UA: ABNORMAL
COMPLEXED PSA SERPL-MCNC: 4.4 NG/ML
GLUCOSE UR QL STRIP: ABNORMAL
KETONES UR QL STRIP: ABNORMAL
LEUKOCYTE ESTERASE URINE, POC: ABNORMAL
NITRITE, POC UA: ABNORMAL
PH, POC UA: 6
POC RESIDUAL URINE VOLUME: 51 ML (ref 0–100)
PROTEIN, POC: ABNORMAL
SPECIFIC GRAVITY, POC UA: 1.01
UROBILINOGEN, POC UA: ABNORMAL

## 2017-08-18 PROCEDURE — 36415 COLL VENOUS BLD VENIPUNCTURE: CPT

## 2017-08-18 PROCEDURE — 81002 URINALYSIS NONAUTO W/O SCOPE: CPT | Mod: PBBFAC,PO | Performed by: UROLOGY

## 2017-08-18 PROCEDURE — 99214 OFFICE O/P EST MOD 30 MIN: CPT | Mod: PBBFAC,PO | Performed by: UROLOGY

## 2017-08-18 PROCEDURE — 99999 PR PBB SHADOW E&M-EST. PATIENT-LVL IV: CPT | Mod: PBBFAC,,, | Performed by: UROLOGY

## 2017-08-18 PROCEDURE — 1159F MED LIST DOCD IN RCRD: CPT | Mod: ,,, | Performed by: UROLOGY

## 2017-08-18 PROCEDURE — 1126F AMNT PAIN NOTED NONE PRSNT: CPT | Mod: ,,, | Performed by: UROLOGY

## 2017-08-18 PROCEDURE — 84153 ASSAY OF PSA TOTAL: CPT

## 2017-08-18 PROCEDURE — 99214 OFFICE O/P EST MOD 30 MIN: CPT | Mod: S$PBB,,, | Performed by: UROLOGY

## 2017-08-18 PROCEDURE — 51798 US URINE CAPACITY MEASURE: CPT | Mod: PBBFAC,PO | Performed by: UROLOGY

## 2017-08-18 PROCEDURE — 3079F DIAST BP 80-89 MM HG: CPT | Mod: ,,, | Performed by: UROLOGY

## 2017-08-18 PROCEDURE — 3077F SYST BP >= 140 MM HG: CPT | Mod: ,,, | Performed by: UROLOGY

## 2017-08-18 RX ORDER — CALCITRIOL 0.25 UG/1
CAPSULE ORAL
COMMUNITY
Start: 2017-07-13 | End: 2018-07-26 | Stop reason: ALTCHOICE

## 2017-08-18 NOTE — PROGRESS NOTES
Subjective:       Patient ID: Micheal Hunt is a 76 y.o. male.    Chief Complaint:   OFFICE NOTE    CHIEF COMPLAINT:  Nocturia.    Mr. Hunt is a 76-year-old male who has a history in the past of having   elevated PSA, status post transrectal ultrasound and biopsies that failed to   show evidence of carcinoma.  The patient has been taking finasteride for the   last two years.  The patient's lower urinary tract symptoms are worsening and at   the present time has nocturia x4.  The flow is weak without interruption.  The   patient refers that when the bladder is too full, he experienced mild dysuria,   but denies any gross hematuria.  He is unsure that he can empty the bladder   satisfactorily.  The patient refers that he is somewhat sleep-deprived because   he has nocturia every one and a half hour or so.  The patient underwent a   postvoid residual urine measurement today and was found to be at 51 mL.  The   last PSA was on 08/11/2015, 5.0.    The family history is negative for prostate cancer.    The past medical and surgical history is well documented in the medical record   and they were reviewed by me during this visit.  It is to be noted that this   patient is not diabetic.    The urinalysis today shows positive protein and trace of blood, otherwise is   negative.      EOR/PN  dd: 08/18/2017 10:09:32 (CDT)  td: 08/18/2017 12:03:55 (CDT)  Doc ID   #[id number]  Job ID #[id number]    CC:       HPI  Review of Systems   Constitutional: Negative for activity change and appetite change.   HENT: Negative.    Eyes: Negative for discharge.   Respiratory: Negative for cough and shortness of breath.    Cardiovascular: Negative for chest pain and palpitations.   Gastrointestinal: Negative for abdominal distention, abdominal pain, constipation and vomiting.   Genitourinary: Positive for decreased urine volume and frequency. Negative for discharge, dysuria, flank pain, hematuria, testicular pain and urgency.    Musculoskeletal: Negative for arthralgias.   Skin: Negative for rash.   Neurological: Negative for dizziness.   Psychiatric/Behavioral: The patient is not nervous/anxious.        Objective:      Physical Exam   Constitutional: He appears well-developed and well-nourished.   HENT:   Head: Normocephalic.   Eyes: Pupils are equal, round, and reactive to light.   Neck: Normal range of motion.   Cardiovascular: Normal rate.    Pulmonary/Chest: Effort normal.   Abdominal: Soft. He exhibits no distension and no mass. There is no tenderness. Hernia confirmed negative in the right inguinal area and confirmed negative in the left inguinal area.   Genitourinary: Rectum normal and penis normal. Rectal exam shows no external hemorrhoid, no mass and no tenderness. Prostate is enlarged. Prostate is not tender. Right testis shows no mass and no tenderness. Left testis shows no mass and no tenderness. No discharge found.   Musculoskeletal: Normal range of motion.   Neurological: He is alert.   Skin: Skin is warm.     Psychiatric: He has a normal mood and affect.       Assessment:       1. BPH with obstruction/lower urinary tract symptoms        Plan:       BPH with obstruction/lower urinary tract symptoms  -     POCT URINE DIPSTICK WITHOUT MICROSCOPE  -     POCT Bladder Scan  -     Prostate Specific Antigen, Diagnostic; Future; Expected date: 08/18/2017  -     TX PRIYA,POST-VOID RES,US,NON-IMAGING  -     Case Request Operating Room: CYSTOSCOPY  -     Place in Outpatient; Standing    Other orders  -     Cancel: POCT URINE DIPSTICK WITHOUT MICROSCOPE  -     Low Risk of VTE; Standing    Patient interested in PVP. Info given. PSA and cystoscopy then schedule for PVP.

## 2017-08-21 ENCOUNTER — TELEPHONE (OUTPATIENT)
Dept: UROLOGY | Facility: CLINIC | Age: 77
End: 2017-08-21

## 2017-08-21 NOTE — TELEPHONE ENCOUNTER
----- Message from Rudy Herring MD sent at 8/18/2017  5:07 PM CDT -----  Inform the Patient the PSA level is 4.4 better than 2 years ago

## 2017-08-30 ENCOUNTER — SURGERY (OUTPATIENT)
Age: 77
End: 2017-08-30

## 2017-08-30 ENCOUNTER — HOSPITAL ENCOUNTER (OUTPATIENT)
Facility: AMBULARY SURGERY CENTER | Age: 77
Discharge: HOME | End: 2017-08-30
Attending: UROLOGY | Admitting: UROLOGY
Payer: MEDICARE

## 2017-08-30 VITALS
RESPIRATION RATE: 20 BRPM | BODY MASS INDEX: 31.14 KG/M2 | OXYGEN SATURATION: 100 % | HEART RATE: 68 BPM | WEIGHT: 235 LBS | HEIGHT: 73 IN | DIASTOLIC BLOOD PRESSURE: 72 MMHG | SYSTOLIC BLOOD PRESSURE: 132 MMHG | TEMPERATURE: 98 F

## 2017-08-30 DIAGNOSIS — N13.8 BPH WITH OBSTRUCTION/LOWER URINARY TRACT SYMPTOMS: ICD-10-CM

## 2017-08-30 DIAGNOSIS — N40.1 BPH WITH OBSTRUCTION/LOWER URINARY TRACT SYMPTOMS: ICD-10-CM

## 2017-08-30 DIAGNOSIS — N40.1 BPH LOC W URIN OBS/LUTS: ICD-10-CM

## 2017-08-30 DIAGNOSIS — N13.8 BPH WITH OBSTRUCTION/LOWER URINARY TRACT SYMPTOMS: Primary | ICD-10-CM

## 2017-08-30 DIAGNOSIS — N40.1 BPH WITH OBSTRUCTION/LOWER URINARY TRACT SYMPTOMS: Primary | ICD-10-CM

## 2017-08-30 PROCEDURE — G8907 PT DOC NO EVENTS ON DISCHARG: HCPCS | Performed by: UROLOGY

## 2017-08-30 PROCEDURE — G8918 PT W/O PREOP ORDER IV AB PRO: HCPCS | Performed by: UROLOGY

## 2017-08-30 PROCEDURE — 52000 CYSTOURETHROSCOPY: CPT | Mod: ,,, | Performed by: UROLOGY

## 2017-08-30 PROCEDURE — 52000 CYSTOURETHROSCOPY: CPT | Performed by: UROLOGY

## 2017-08-30 RX ORDER — WATER 1000 ML/1000ML
INJECTION, SOLUTION INTRAVENOUS
Status: DISCONTINUED | OUTPATIENT
Start: 2017-08-30 | End: 2017-08-30 | Stop reason: HOSPADM

## 2017-08-30 RX ORDER — LIDOCAINE HYDROCHLORIDE 20 MG/ML
JELLY TOPICAL
Status: DISCONTINUED | OUTPATIENT
Start: 2017-08-30 | End: 2017-08-30 | Stop reason: HOSPADM

## 2017-08-30 RX ADMIN — WATER 400 ML: 1000 INJECTION, SOLUTION INTRAVENOUS at 01:08

## 2017-08-30 RX ADMIN — LIDOCAINE HYDROCHLORIDE 5 ML: 20 JELLY TOPICAL at 01:08

## 2017-08-30 NOTE — BRIEF OP NOTE
Brief Operative Note     Surgery Date: 8/30/2017    Surgeon:   Rudy Herring MD     Pre-op Diagnosis:  BPH with obstruction/lower urinary tract symptoms [N40.1, N13.8]  BPH with obstruction/lower urinary tract symptoms [N40.1, N13.8]    Post-op Diagnosis:  Same    Procedure:  Procedure(s) (LRB):  CYSTOSCOPY (N/A)    Anesthesia: Local MAC    Findings/Key Components:  See Op Report    Estimated Blood Loss: Minimal                                                                                                                           Discharge Summary    Admit Date: 8/30/2017     Attending Physician: Rudy Herring MD     Discharge Physician: Rudy Herring MD      Discharge Date: 8/30/2017    Treatments/Procedures: Procedure(s) (LRB):  CYSTOSCOPY (N/A)  Final Diagnosis:BPH with MANZANO  Hospital Course: Cystoscopy done as planned  F>U> Dr. Herring 9/19/17 for PVP    Disposition: Home or Self Care     Patient Instructions:     Current Discharge Medication List:         Micheal Hunt   Home Medication Instructions POAL:[id number]    Printed on:08/30/17 7789   Medication Information                      albuterol-ipratropium 2.5mg-0.5mg/3mL (DUO-NEB) 0.5 mg-3 mg(2.5 mg base)/3 mL nebulizer solution  Take 3 mLs by nebulization every 6 (six) hours as needed for Wheezing. Rescue             allopurinol (ZYLOPRIM) 300 MG tablet  Take 1 tablet (300 mg total) by mouth once daily.             amlodipine (NORVASC) 10 MG tablet  Take 1 tablet (10 mg total) by mouth before dinner.             aspirin (ECOTRIN) 81 MG EC tablet  Take 81 mg by mouth once daily.               atorvastatin (LIPITOR) 80 MG tablet  Take 1 tablet (80 mg total) by mouth once daily.             azelastine (ASTELIN) 137 mcg (0.1 %) nasal spray  USE 2 SPRAYS TWICE DAILY IN EACH NOSTRIL             budesonide-formoterol 160-4.5 mcg (SYMBICORT) 160-4.5 mcg/actuation HFAA  Inhale 1 puff into the lungs every 12 (twelve) hours.  Controller             calcitRIOL (ROCALTROL) 0.25 MCG Cap               calcium-vitamin D 500-125 mg-unit tablet  Take 1 tablet by mouth as needed.              carvedilol (COREG) 6.25 MG tablet  Take 1 tablet (6.25 mg total) by mouth 2 (two) times daily with meals.             colchicine 0.6 mg tablet  Take 1 tablet (0.6 mg total) by mouth 2 (two) times daily.             cyanocobalamin, vitamin B-12, (VITAMIN B-12) 1,000 mcg Subl  Take 1 tablet by mouth daily as needed. Takes 3,000mcg             diclofenac sodium (VOLTAREN) 1 % Gel  Apply 2 g topically 2 (two) times daily. To knees             finasteride (PROSCAR) 5 mg tablet  TAKE 1 TABLET ONCE DAILY.             fluticasone (FLONASE) 50 mcg/actuation nasal spray  INHALE 1 SPRAY BY EACH NARE ONCE DAILY.             gabapentin (NEURONTIN) 100 MG capsule  1 by mouth in the morning 1 by mouth in the afternoon and 3 by mouth at bedtime             inhalation device (AEROCHAMBER PLUS FLOW-VU)  Use as directed for inhalation.             meclizine (ANTIVERT) 25 mg tablet  TAKE 1 TABLET BY MOUTH 3 TIMES A DAY AS NEEDED             nitroGLYCERIN (NITROSTAT) 0.4 MG SL tablet  Place 1 tablet (0.4 mg total) under the tongue every 5 (five) minutes as needed for Chest pain.             terazosin (HYTRIN) 5 MG capsule  Take 1 capsule (5 mg total) by mouth every evening.             tramadol (ULTRAM) 50 mg tablet  Take 1 tablet (50 mg total) by mouth every 6 (six) hours as needed.             valsartan (DIOVAN) 320 MG tablet  Take 1 tablet (320 mg total) by mouth once daily.                     Current Discharge Medication List      CONTINUE these medications which have NOT CHANGED    Details   allopurinol (ZYLOPRIM) 300 MG tablet Take 1 tablet (300 mg total) by mouth once daily.  Qty: 90 tablet, Refills: 3      atorvastatin (LIPITOR) 80 MG tablet Take 1 tablet (80 mg total) by mouth once daily.  Qty: 90 tablet, Refills: 3    Associated Diagnoses: Hyperlipidemia, unspecified  hyperlipidemia type      azelastine (ASTELIN) 137 mcg (0.1 %) nasal spray USE 2 SPRAYS TWICE DAILY IN EACH NOSTRIL  Qty: 30 mL, Refills: 5    Associated Diagnoses: Asthma with allergic rhinitis, mild persistent, uncomplicated      budesonide-formoterol 160-4.5 mcg (SYMBICORT) 160-4.5 mcg/actuation HFAA Inhale 1 puff into the lungs every 12 (twelve) hours. Controller  Qty: 2 Inhaler, Refills: 3    Associated Diagnoses: Asthma with allergic rhinitis, mild persistent, uncomplicated      calcitRIOL (ROCALTROL) 0.25 MCG Cap       calcium-vitamin D 500-125 mg-unit tablet Take 1 tablet by mouth as needed.       carvedilol (COREG) 6.25 MG tablet Take 1 tablet (6.25 mg total) by mouth 2 (two) times daily with meals.  Qty: 90 tablet, Refills: 3    Associated Diagnoses: LV dysfunction; Essential hypertension      colchicine 0.6 mg tablet Take 1 tablet (0.6 mg total) by mouth 2 (two) times daily.  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 Takes 3,000mcg      finasteride (PROSCAR) 5 mg tablet TAKE 1 TABLET ONCE DAILY.  Qty: 90 tablet, Refills: 3    Associated Diagnoses: Benign non-nodular prostatic hyperplasia without lower urinary tract symptoms      gabapentin (NEURONTIN) 100 MG capsule 1 by mouth in the morning 1 by mouth in the afternoon and 3 by mouth at bedtime  Qty: 150 capsule, Refills: 5    Associated Diagnoses: Paresthesia of right foot      terazosin (HYTRIN) 5 MG capsule Take 1 capsule (5 mg total) by mouth every evening.  Qty: 90 capsule, Refills: 0    Associated Diagnoses: Essential hypertension      valsartan (DIOVAN) 320 MG tablet Take 1 tablet (320 mg total) by mouth once daily.  Qty: 90 tablet, Refills: 3    Associated Diagnoses: Cough due to ACE inhibitor; Essential hypertension      albuterol-ipratropium 2.5mg-0.5mg/3mL (DUO-NEB)  0.5 mg-3 mg(2.5 mg base)/3 mL nebulizer solution Take 3 mLs by nebulization every 6 (six) hours as needed for Wheezing. Rescue  Qty: 30 vial, Refills: 0    Associated Diagnoses: Persistent cough      amlodipine (NORVASC) 10 MG tablet Take 1 tablet (10 mg total) by mouth before dinner.  Qty: 90 tablet, Refills: 3    Associated Diagnoses: Essential hypertension      aspirin (ECOTRIN) 81 MG EC tablet Take 81 mg by mouth once daily.        diclofenac sodium (VOLTAREN) 1 % Gel Apply 2 g topically 2 (two) times daily. To knees  Qty: 2 Tube, Refills: 3      fluticasone (FLONASE) 50 mcg/actuation nasal spray INHALE 1 SPRAY BY EACH NARE ONCE DAILY.  Qty: 3 Bottle, Refills: 3    Associated Diagnoses: Seasonal allergic rhinitis      inhalation device (AEROCHAMBER PLUS FLOW-VU) Use as directed for inhalation.  Qty: 1 Device, Refills: 0    Associated Diagnoses: Acute bronchitis, unspecified organism; Shortness of breath      meclizine (ANTIVERT) 25 mg tablet TAKE 1 TABLET BY MOUTH 3 TIMES A DAY AS NEEDED  Qty: 90 tablet, Refills: 6      nitroGLYCERIN (NITROSTAT) 0.4 MG SL tablet Place 1 tablet (0.4 mg total) under the tongue every 5 (five) minutes as needed for Chest pain.  Qty: 25 tablet, Refills: 4    Associated Diagnoses: Abnormal cardiovascular stress test; Angina of effort      tramadol (ULTRAM) 50 mg tablet Take 1 tablet (50 mg total) by mouth every 6 (six) hours as needed.  Qty: 120 tablet, Refills: 4    Comments: This request is for a new prescription for a controlled substance as required by Federal/State law.  Associated Diagnoses: Gout, arthritis             Discharge Procedure Orders:      Discharge Procedure Orders  Diet general     Activity as tolerated

## 2017-08-30 NOTE — H&P
CHIEF COMPLAINT:  Nocturia.     Mr. Hunt is a 76-year-old male who has a history in the past of having   elevated PSA, status post transrectal ultrasound and biopsies that failed to   show evidence of carcinoma.  The patient has been taking finasteride for the   last two years.  The patient's lower urinary tract symptoms are worsening and at   the present time has nocturia x4.  The flow is weak without interruption.  The   patient refers that when the bladder is too full, he experienced mild dysuria,   but denies any gross hematuria.  He is unsure that he can empty the bladder   satisfactorily.  The patient refers that he is somewhat sleep-deprived because   he has nocturia every one and a half hour or so.  The patient underwent a   postvoid residual urine measurement today and was found to be at 51 mL.  The   last PSA was on 08/11/2015, 5.0.     The family history is negative for prostate cancer.     The past medical and surgical history is well documented in the medical record   and they were reviewed by me during this visit.  It is to be noted that this   patient is not diabetic.     The urinalysis today shows positive protein and trace of blood, otherwise is   negative.          Review of Systems   Constitutional: Negative for activity change and appetite change.   HENT: Negative.    Eyes: Negative for discharge.   Respiratory: Negative for cough and shortness of breath.    Cardiovascular: Negative for chest pain and palpitations.   Gastrointestinal: Negative for abdominal distention, abdominal pain, constipation and vomiting.   Genitourinary: Positive for decreased urine volume and frequency. Negative for discharge, dysuria, flank pain, hematuria, testicular pain and urgency.   Musculoskeletal: Negative for arthralgias.   Skin: Negative for rash.   Neurological: Negative for dizziness.   Psychiatric/Behavioral: The patient is not nervous/anxious.        Objective:      Physical Exam   Constitutional: He  appears well-developed and well-nourished.   HENT:   Head: Normocephalic.   Eyes: Pupils are equal, round, and reactive to light.   Neck: Normal range of motion.   Cardiovascular: Normal rate.    Pulmonary/Chest: Effort normal.   Abdominal: Soft. He exhibits no distension and no mass. There is no tenderness. Hernia confirmed negative in the right inguinal area and confirmed negative in the left inguinal area.   Genitourinary: Rectum normal and penis normal. Rectal exam shows no external hemorrhoid, no mass and no tenderness. Prostate is enlarged. Prostate is not tender. Right testis shows no mass and no tenderness. Left testis shows no mass and no tenderness. No discharge found.   Musculoskeletal: Normal range of motion.   Neurological: He is alert.   Skin: Skin is warm.     Psychiatric: He has a normal mood and affect.       Assessment:       1. BPH with obstruction/lower urinary tract symptoms        Plan:       BPH with obstruction/lower urinary tract symptoms  Cystoscopy

## 2017-08-30 NOTE — OP NOTE
CYSTOSCOPY REPORT    Surgery Date:  2017  Surgeon(s) and Role:     * Rudy Herring MD - Primary      Micheal Hunt  : 1940    Pre Procedure Diagnosis:BPH with LUTS    OPERATION: CYSTOSCOPY     ANESTHESIA: 10 cc 2% lidocaine jelly applied per urethra.  14 FR Flexible Olympus cystoscope used.    PROCEDURE:  The patient was taken to the cystoscopy suite and placed in supine position.  The genitalia was prepped and draped  in the usual sterile fashion.  Two percent lidocaine jelly was inserted in the urethra and held in place with a penile clamp.  After sufficent time had passed to allow good local anesthesia, the cystoscope was inserted in the urethra and passed into the bladder visualizing the urethra along its entire course.  The dome, anterior, posterior and lateral walls were examined systematically.  The ureteral orifices were in their usual position and configuration.  The cystoscope was turned upon itself 180 degrees to visualize the bladder neck.The cystoscope was then brought to the level of the bladder neck, the water was turned on and the prostate was visualized.  The cystoscope was removed and the patient was instructed to urinate prior to leaving the office.  SPECIMEN:none    FINDINGS:  URETHRA: open with no stricyures  PROSTATE: 5 cm with trilobar BPH and MANZANO   TRABEC: grade 3  BLADDER: no lesions or stones  URETERAL ORIFICES : elevated by median lobe enlargement   OTHER FINDINGS: none    Procedure medication: Lidocaine gel in the urethra  Post Procedure Diagnosis: BPH with MANZANO     ASSESSMENT/PLAN:  Status post flexible cystoscopy.  1. Push fluids for 24 hours.  2. May see blood in the urine, this should gradually improve over the next 2-3 days.  3. The patient was instructed to return to the office or go to the emergency should fever, chills, cloudy urine, or inability to urinate develop.  4.  PLAN: PVP  5. Follow up in 17 for PVP.

## 2017-09-01 ENCOUNTER — DOCUMENTATION ONLY (OUTPATIENT)
Dept: FAMILY MEDICINE | Facility: CLINIC | Age: 77
End: 2017-09-01

## 2017-09-01 NOTE — PROGRESS NOTES
Pre-Visit Chart Review  For Appointment Scheduled on 09/05/2017    Health Maintenance Due   Topic Date Due    TETANUS VACCINE  09/27/1957    Zoster Vaccine  09/27/1999    Influenza Vaccine  08/01/2017

## 2017-09-05 ENCOUNTER — OFFICE VISIT (OUTPATIENT)
Dept: FAMILY MEDICINE | Facility: CLINIC | Age: 77
End: 2017-09-05
Payer: MEDICARE

## 2017-09-05 VITALS
TEMPERATURE: 98 F | HEART RATE: 62 BPM | HEIGHT: 73 IN | WEIGHT: 238.13 LBS | SYSTOLIC BLOOD PRESSURE: 136 MMHG | BODY MASS INDEX: 31.56 KG/M2 | DIASTOLIC BLOOD PRESSURE: 74 MMHG

## 2017-09-05 DIAGNOSIS — I25.10 CORONARY ARTERY DISEASE INVOLVING NATIVE HEART WITHOUT ANGINA PECTORIS, UNSPECIFIED VESSEL OR LESION TYPE: ICD-10-CM

## 2017-09-05 DIAGNOSIS — R94.39 ABNORMAL CARDIOVASCULAR STRESS TEST: ICD-10-CM

## 2017-09-05 DIAGNOSIS — I51.89 DIASTOLIC DYSFUNCTION: Primary | ICD-10-CM

## 2017-09-05 DIAGNOSIS — I10 ESSENTIAL HYPERTENSION: ICD-10-CM

## 2017-09-05 PROCEDURE — 3078F DIAST BP <80 MM HG: CPT | Mod: ,,, | Performed by: FAMILY MEDICINE

## 2017-09-05 PROCEDURE — 1159F MED LIST DOCD IN RCRD: CPT | Mod: ,,, | Performed by: FAMILY MEDICINE

## 2017-09-05 PROCEDURE — 1126F AMNT PAIN NOTED NONE PRSNT: CPT | Mod: ,,, | Performed by: FAMILY MEDICINE

## 2017-09-05 PROCEDURE — 3075F SYST BP GE 130 - 139MM HG: CPT | Mod: ,,, | Performed by: FAMILY MEDICINE

## 2017-09-05 PROCEDURE — 99214 OFFICE O/P EST MOD 30 MIN: CPT | Mod: S$PBB,,, | Performed by: FAMILY MEDICINE

## 2017-09-05 PROCEDURE — 99213 OFFICE O/P EST LOW 20 MIN: CPT | Mod: PBBFAC,PO | Performed by: FAMILY MEDICINE

## 2017-09-05 PROCEDURE — 99999 PR PBB SHADOW E&M-EST. PATIENT-LVL III: CPT | Mod: PBBFAC,,, | Performed by: FAMILY MEDICINE

## 2017-09-05 RX ORDER — TERAZOSIN 5 MG/1
5 CAPSULE ORAL NIGHTLY
Qty: 90 CAPSULE | Refills: 3 | Status: SHIPPED | OUTPATIENT
Start: 2017-09-05 | End: 2017-09-13 | Stop reason: SDUPTHER

## 2017-09-05 NOTE — PROGRESS NOTES
Subjective:       Patient ID: Micheal Hunt is a 76 y.o. male.    Chief Complaint: Hypertension    Hypertension   This is a chronic problem. The current episode started more than 1 year ago. The problem has been gradually improving since onset. The problem is controlled. Associated symptoms include anxiety. Pertinent negatives include no chest pain, headaches, neck pain, orthopnea, palpitations, peripheral edema, PND, shortness of breath or sweats. Risk factors for coronary artery disease include male gender, obesity and sedentary lifestyle. The current treatment provides mild improvement. Compliance problems include exercise.  Identifiable causes of hypertension include chronic renal disease. He is intolerant to ARB..     Review of Systems   Constitutional: Negative for fatigue and unexpected weight change.   Respiratory: Negative for chest tightness and shortness of breath.    Cardiovascular: Negative for chest pain, palpitations, orthopnea, leg swelling and PND.   Gastrointestinal: Negative for abdominal pain.   Musculoskeletal: Negative for arthralgias and neck pain.   Neurological: Negative for dizziness, syncope, light-headedness and headaches.       Patient Active Problem List   Diagnosis    Osteoarthritis of right knee, L-TKR 10/2012    Nocturia    Hematuria    BPH (benign prostatic hyperplasia)    Carotid stenosis    HTN (hypertension),     Hyperlipidemia, baseline     CAD (coronary artery disease), 2V CABG 2007    Gout, arthritis    Obesity, Class I, BMI 32.8 to 34.3, 32.7, down to 32.1    Vertigo    LVH (left ventricular hypertrophy) due to hypertensive disease    Abnormal cardiovascular stress test    CKD (chronic kidney disease), stage III, eGFR 40, 7/2014    GERD (gastroesophageal reflux disease)    Elevated PSA    Sleep disorder    Acquired hammer toe    Diastolic dysfunction    Abdominal obesity    Back pain, chronic    Glucose intolerance (impaired glucose  tolerance)    Anemia, mild    Gastric nodule    Diverticulitis, 2005, 2015    Personal history of colonic polyps    PSA elevation    DDD (degenerative disc disease), lumbar    LV dysfunction, EF 50%, reduced to 34%    Other spondylosis, lumbar region    Knee pain    NICK (acute kidney injury)    BPH with obstruction/lower urinary tract symptoms    Essential hypertension       Objective:      Physical Exam   Constitutional: He is oriented to person, place, and time. He appears well-developed and well-nourished.   Cardiovascular: Normal rate, regular rhythm and normal heart sounds.    Pulmonary/Chest: Effort normal and breath sounds normal.   Musculoskeletal: He exhibits no edema.   Neurological: He is alert and oriented to person, place, and time.   Skin: Skin is warm and dry.   Psychiatric: He has a normal mood and affect.   Nursing note and vitals reviewed.      Lab Results   Component Value Date    WBC 7.40 06/29/2017    HGB 11.3 (L) 06/29/2017    HCT 33.2 (L) 06/29/2017     (L) 06/29/2017    CHOL 116 (L) 03/10/2017    TRIG 121 03/10/2017    HDL 29 (L) 03/10/2017    ALT 17 06/29/2017    AST 25 06/29/2017     06/29/2017    K 4.1 06/29/2017     (H) 06/29/2017    CREATININE 3.2 (H) 06/29/2017    BUN 42 (H) 06/29/2017    CO2 23 06/29/2017    TSH 0.787 04/26/2017    PSA 4.01 (H) 06/03/2013    INR 0.97 05/09/2012    HGBA1C 6.2 02/13/2015     The ASCVD Risk score (Armagh DC Jr., et al., 2013) failed to calculate for the following reasons:    The valid total cholesterol range is 130 to 320 mg/dL    Assessment:       1. Diastolic dysfunction    2. Essential hypertension    3. Coronary artery disease involving native heart without angina pectoris, unspecified vessel or lesion type    4. Abnormal cardiovascular stress test        Plan:       Diastolic dysfunction    Essential hypertension  -     terazosin (HYTRIN) 5 MG capsule; Take 1 capsule (5 mg total) by mouth every evening.  Dispense: 90  capsule; Refill: 3    Coronary artery disease involving native heart without angina pectoris, unspecified vessel or lesion type    Abnormal cardiovascular stress test    Other orders  -     DIPH,PERTUSS,ACEL,,TET VAC,PF,, ADULT, (ADACEL) 2 Lf-(2.5-5-3-5 mcg)-5Lf/0.5 mL injection; Inject 0.5 mLs into the muscle once.  Dispense: 0.5 mL; Refill: 0      Patient readiness: acceptance and barriers:readiness    During the course of the visit the patient was educated and counseled about the following:     Hypertension:   Dietary sodium restriction.  Regular aerobic exercise.  Obesity:   General weight loss/lifestyle modification strategies discussed (elicit support from others; identify saboteurs; non-food rewards, etc).    Goals: Hypertension: Reduce Blood Pressure and Obesity: Reduce calorie intake and BMI    Did patient meet goals/outcomes: Yes    The following self management tools provided: blood pressure log  excercise log    Patient Instructions (the written plan) was given to the patient/family.     Time spent with patient: 30 minutes

## 2017-09-05 NOTE — PATIENT INSTRUCTIONS
Established High Blood Pressure    High blood pressure (hypertension) is a chronic disease. Often health care providers dont know what causes it. But it can be caused by certain health conditions and medicines.  If you have high blood pressure, you may not have any symptoms. If you do have symptoms, they may include headache, dizziness, changes in your vision, chest pain, and shortness of breath. But even without symptoms, high blood pressure thats not treated raises your risk for heart attack and stroke. High blood pressure is a serious health risk and shouldnt be ignored.  A blood pressure reading is made up of two numbers: a higher number over a lower number. The top number is the systolic pressure. The bottom number is the diastolic pressure. A normal blood pressure is less than 120 over less than 80.  High blood pressure is when either the top number is 140 or higher, or the bottom number is 90 or higher. This must be the result when taking your blood pressure a number of times. The blood pressures between normal and high are called prehypertension.  Home care  If you have high blood pressure, you should do what is listed below to lower your blood pressure. If you are taking medicines for high blood pressure, these methods may reduce or end your need for medicines in the future.  · Begin a weight-loss program if you are overweight.  · Cut back on how much salt you get in your diet. Heres how to do this:  ¨ Dont eat foods that have a lot of salt. These include olives, pickles, smoked meats, and salted potato chips.  ¨ Dont add salt to your food at the table.  ¨ Use only small amounts of salt when cooking.  · Begin an exercise program. Talk with your health care provider about the type of exercise program that would be best for you. It doesn't have to be hard. Even brisk walking for 20 minutes 3 times a week is a good form of exercise.  · Dont take medicines that have heart stimulants. This includes many  cold and sinus decongestant pills and sprays, as well as diet pills. Check the warnings about hypertension on the label. Stimulants such as amphetamine or cocaine could be lethal for someone with high blood pressure. Never take these.  · Limit how much caffeine you get in your diet. Switch to caffeine-free products.  · Stop smoking. If you are a long-time smoker, this can be hard. Enroll in a stop-smoking program to make it more likely that you will quit for good.  · Learn how to handle stress. This is an important part of any program to lower blood pressure. Learn about relaxation methods like meditation, yoga, or biofeedback.  · If your provider prescribed medicines, take them exactly as directed. Missing doses may cause your blood pressure get out of control.  · Consider buying an automatic blood pressure machine. You can get one of these at most pharmacies. Use this to watch your blood pressure at home. Give the results to your provider.  Follow-up care  You will need to make regular visits to your health care provider. This is to check your blood pressure and to make changes to your medicines. Make a follow-up appointment as directed.  When to seek medical advice  Call your health care provider right away if any of these occur:  · Chest pain or shortness of breath  · Severe headache  · Throbbing or rushing sound in the ears  · Nosebleed  · Sudden severe pain in your belly (abdomen)  · Extreme drowsiness, confusion, or fainting  · Dizziness or dizziness with a spinning sensation (vertigo)  · Weakness of an arm or leg or one side of the face  · You have problems speaking or seeing   Date Last Reviewed: 11/25/2014  © 1462-8406 [employer]. [address] [place] [address], [place] [address]. All rights reserved. This information is not intended as a substitute for professional medical care. Always follow your healthcare professional's instructions.        A Sample Walking Program  Experts recommend walking  briskly on most days. Aim for a target of 30 minutes on most days, or 150 or more minutes a week. Walking programs can help you reach this goal by slowly increasing the frequency and the amount of  time you walk. Try this walking program:    First week  · Walk 3 times a week.  · Walk for 5 minutes each time.  Second week  · Walk 3 times a week.  · Walk for 10 minutes each time.  Third week  · Walk 3 times a week.  · Walk for 13 minutes each time.  Fourth week  · Walk 3 times a week.  · Walk for 15 minutes each time.  Fifth week  · Walk 4 times a week.  · Walk for 15 minutes each time.  Sixth week and beyond  Gradually increase your minutes of walking each time, and your number of times each week, until you reach 30 minutes, 5-7 days of the week.  Tips for getting the most from your walking program  · Walk briskly. If you can sing, speed up. If you cant talk easily, slow down.  · Choose good walking shoes with padded soles and good arch support.  · Dont use hand or ankle weights. They can cause injuries.  · Walk indoors if the weather is bad. Use a treadmill or walk inside a shopping mall  Before you start walking, check with your healthcare provider if you are new to exercise, over 40, overweight, or a smoker. Also check with your provider  if you have heart disease, high blood pressure, diabetes, arthritis, asthma, or any other health problem that concerns you. Your provider can help you get started and help you stay safe.   Date Last Reviewed: 8/13/2015  © 9425-1684 [employer]. [address] [place] [address], [place] [address]. All rights reserved. This information is not intended as a substitute for professional medical care. Always follow your healthcare professional's instructions.        Weight Management: Getting Started  Healthy bodies come in all shapes and sizes. Not all bodies are made to be thin. For some people, a healthy weight is higher than the average weight listed on weight charts. Your  healthcare provider can help you decide on a healthy weight for you.    Reasons to lose weight  Losing weight can help with some health problems, such as high blood pressure, heart disease, diabetes, sleep apnea, and arthritis. You may also feel more energy.  Set your long-term goal  Your goal doesn't even have to be a specific weight. You may decide on a fitness goal (such as being able to walk 10 miles a week), or a health goal (such as lowering your blood pressure). Choose a goal that is measurable and reasonable, so you know when you've reached it. A goal of reaching a BMI of less than 25 is not always reasonable (or possible).   Make an action plan  Habits dont change overnight. Setting your goals too high can leave you feeling discouraged if you cant reach them. Be realistic. Choose one or two small changes you can make now. Set an action plan for how you are going to make these changes. When you can stick to this plan, keep making a few more small changes. Taking small steps will help you stay on the path to success.  Track your progress  Write down your goals. Then, keep a daily record of your progress. Write down what you eat and how active you are. This record lets you look back on how much youve done. It may also help when youre feeling frustrated. Reward yourself for success. Even if you dont reach every goal, give yourself credit for what you do get done.  Get support  Encouragement from others can help make losing weight easier. Ask your family members and friends for support. They may even want to join you. Also look to your healthcare provider, registered dietitian, and  for help. Your local hospital can give you more information about nutrition, exercise, and weight loss.  Date Last Reviewed: 1/31/2016  © 8118-2537 [employer]. [address] [place] [address], [place] [address]. All rights reserved. This information is not intended as a substitute for professional  medical care. Always follow your healthcare professional's instructions.

## 2017-09-13 ENCOUNTER — HOSPITAL ENCOUNTER (OUTPATIENT)
Dept: RADIOLOGY | Facility: HOSPITAL | Age: 77
Discharge: HOME | End: 2017-09-13
Attending: UROLOGY
Payer: MEDICARE

## 2017-09-13 ENCOUNTER — HOSPITAL ENCOUNTER (OUTPATIENT)
Dept: PREADMISSION TESTING | Facility: HOSPITAL | Age: 77
Discharge: HOME | End: 2017-09-13
Attending: UROLOGY
Payer: MEDICARE

## 2017-09-13 ENCOUNTER — OFFICE VISIT (OUTPATIENT)
Dept: CARDIOLOGY | Facility: CLINIC | Age: 77
End: 2017-09-13
Payer: MEDICARE

## 2017-09-13 VITALS
OXYGEN SATURATION: 99 % | HEART RATE: 59 BPM | BODY MASS INDEX: 31.64 KG/M2 | DIASTOLIC BLOOD PRESSURE: 87 MMHG | SYSTOLIC BLOOD PRESSURE: 164 MMHG | WEIGHT: 238.75 LBS | HEIGHT: 73 IN

## 2017-09-13 DIAGNOSIS — G47.9 SLEEP DISORDER: ICD-10-CM

## 2017-09-13 DIAGNOSIS — N13.8 NODULAR PROSTATE WITH URINARY OBSTRUCTION: ICD-10-CM

## 2017-09-13 DIAGNOSIS — N13.8 BENIGN PROSTATIC HYPERPLASIA WITH URINARY OBSTRUCTION: ICD-10-CM

## 2017-09-13 DIAGNOSIS — I51.9 LV DYSFUNCTION: Primary | ICD-10-CM

## 2017-09-13 DIAGNOSIS — L29.9 ITCHING: ICD-10-CM

## 2017-09-13 DIAGNOSIS — I11.9 LVH (LEFT VENTRICULAR HYPERTROPHY) DUE TO HYPERTENSIVE DISEASE, WITHOUT HEART FAILURE: ICD-10-CM

## 2017-09-13 DIAGNOSIS — N40.1 HYPERTROPHY OF PROSTATE WITH URINARY OBSTRUCTION AND OTHER LOWER URINARY TRACT SYMPTOMS (LUTS): Primary | ICD-10-CM

## 2017-09-13 DIAGNOSIS — E66.9 OBESITY, CLASS I, BMI 30-34.9: Chronic | ICD-10-CM

## 2017-09-13 DIAGNOSIS — N13.8 HYPERTROPHY OF PROSTATE WITH URINARY OBSTRUCTION AND OTHER LOWER URINARY TRACT SYMPTOMS (LUTS): Primary | ICD-10-CM

## 2017-09-13 DIAGNOSIS — N40.1 BENIGN PROSTATIC HYPERPLASIA WITH URINARY OBSTRUCTION: ICD-10-CM

## 2017-09-13 DIAGNOSIS — I10 ESSENTIAL HYPERTENSION: ICD-10-CM

## 2017-09-13 DIAGNOSIS — N40.3 NODULAR PROSTATE WITH URINARY OBSTRUCTION: ICD-10-CM

## 2017-09-13 DIAGNOSIS — I25.10 CORONARY ARTERY DISEASE INVOLVING NATIVE CORONARY ARTERY OF NATIVE HEART WITHOUT ANGINA PECTORIS: ICD-10-CM

## 2017-09-13 LAB
ANION GAP SERPL CALC-SCNC: 6 MMOL/L
BASOPHILS # BLD AUTO: 0 K/UL
BASOPHILS NFR BLD: 0.3 %
BUN SERPL-MCNC: 33 MG/DL
CALCIUM SERPL-MCNC: 9 MG/DL
CHLORIDE SERPL-SCNC: 107 MMOL/L
CO2 SERPL-SCNC: 25 MMOL/L
CREAT SERPL-MCNC: 3.4 MG/DL
DIFFERENTIAL METHOD: ABNORMAL
EOSINOPHIL # BLD AUTO: 0.4 K/UL
EOSINOPHIL NFR BLD: 5 %
ERYTHROCYTE [DISTWIDTH] IN BLOOD BY AUTOMATED COUNT: 14.3 %
EST. GFR  (AFRICAN AMERICAN): 19 ML/MIN/1.73 M^2
EST. GFR  (NON AFRICAN AMERICAN): 16 ML/MIN/1.73 M^2
GLUCOSE SERPL-MCNC: 88 MG/DL
HCT VFR BLD AUTO: 33.8 %
HGB BLD-MCNC: 11.4 G/DL
LYMPHOCYTES # BLD AUTO: 2.7 K/UL
LYMPHOCYTES NFR BLD: 35.4 %
MCH RBC QN AUTO: 30.5 PG
MCHC RBC AUTO-ENTMCNC: 33.7 G/DL
MCV RBC AUTO: 91 FL
MONOCYTES # BLD AUTO: 0.5 K/UL
MONOCYTES NFR BLD: 6.3 %
NEUTROPHILS # BLD AUTO: 4 K/UL
NEUTROPHILS NFR BLD: 53 %
PLATELET # BLD AUTO: 175 K/UL
PMV BLD AUTO: 7.9 FL
POTASSIUM SERPL-SCNC: 4.7 MMOL/L
RBC # BLD AUTO: 3.74 M/UL
SODIUM SERPL-SCNC: 138 MMOL/L
WBC # BLD AUTO: 7.6 K/UL

## 2017-09-13 PROCEDURE — 99900103 DSU ONLY-NO CHARGE-INITIAL HR (STAT)

## 2017-09-13 PROCEDURE — 1159F MED LIST DOCD IN RCRD: CPT | Mod: ,,, | Performed by: INTERNAL MEDICINE

## 2017-09-13 PROCEDURE — 85025 COMPLETE CBC W/AUTO DIFF WBC: CPT

## 2017-09-13 PROCEDURE — 99999 PR PBB SHADOW E&M-EST. PATIENT-LVL III: CPT | Mod: PBBFAC,,, | Performed by: INTERNAL MEDICINE

## 2017-09-13 PROCEDURE — 99214 OFFICE O/P EST MOD 30 MIN: CPT | Mod: S$PBB,,, | Performed by: INTERNAL MEDICINE

## 2017-09-13 PROCEDURE — 99900104 DSU ONLY-NO CHARGE-EA ADD'L HR (STAT)

## 2017-09-13 PROCEDURE — 1126F AMNT PAIN NOTED NONE PRSNT: CPT | Mod: ,,, | Performed by: INTERNAL MEDICINE

## 2017-09-13 PROCEDURE — 80048 BASIC METABOLIC PNL TOTAL CA: CPT

## 2017-09-13 PROCEDURE — 3079F DIAST BP 80-89 MM HG: CPT | Mod: ,,, | Performed by: INTERNAL MEDICINE

## 2017-09-13 PROCEDURE — 3077F SYST BP >= 140 MM HG: CPT | Mod: ,,, | Performed by: INTERNAL MEDICINE

## 2017-09-13 PROCEDURE — 71020 XR CHEST PA AND LATERAL PRE-OP: CPT | Mod: 26,,, | Performed by: RADIOLOGY

## 2017-09-13 PROCEDURE — 36415 COLL VENOUS BLD VENIPUNCTURE: CPT

## 2017-09-13 PROCEDURE — 71020 XR CHEST PA AND LATERAL PRE-OP: CPT | Mod: TC

## 2017-09-13 PROCEDURE — 99213 OFFICE O/P EST LOW 20 MIN: CPT | Mod: PBBFAC,PO | Performed by: INTERNAL MEDICINE

## 2017-09-13 RX ORDER — VALSARTAN 320 MG/1
320 TABLET ORAL DAILY
Qty: 90 TABLET | Refills: 3 | Status: SHIPPED | OUTPATIENT
Start: 2017-09-13 | End: 2018-02-15 | Stop reason: SDUPTHER

## 2017-09-13 RX ORDER — ZOLPIDEM TARTRATE 5 MG/1
5 TABLET ORAL NIGHTLY PRN
Qty: 30 TABLET | Refills: 3 | Status: SHIPPED | OUTPATIENT
Start: 2017-09-13 | End: 2018-03-13 | Stop reason: SDUPTHER

## 2017-09-13 RX ORDER — TERAZOSIN 5 MG/1
10 CAPSULE ORAL NIGHTLY
Qty: 90 CAPSULE | Refills: 3
Start: 2017-09-13 | End: 2018-03-14 | Stop reason: SDUPTHER

## 2017-09-13 NOTE — PROGRESS NOTES
Subjective:    Patient ID:  Micheal Hunt is a 76 y.o. male who presents for  of Follow-up  For post CABG, 3 months review, post biopsies of kidney and stomach, HTN  PCP: Dr. Lakhani  Renal: Dr. Rojo  ENT: Dr. Nails  GI: Dr. Harris, Dr. Saleem  Physical medicine: Dr. Whitehead  Urology: Dr. Herring  Orthopedic: Dr. Fox in [place], [place], [phone],  Henri  Lives alone, daughter, Tonya, on the same ranch, 20 acre, 4 horses, 20 chicken, 3  workers, prior commercial contractor, grandson, Uziel  Daughter, Crow, nutritionist in [facility] supportive of Mediterranean diet, and a Yoga instruction.      HPI Comments: Upper sor[place]  ([place]) male, retired cottrell at [employer], here for pre-op evaluation. Significant cardiac history with previous CABG in [place]. Stress test earlier this year was abnormal: Lexiscan -  Nuclear Quantitative Functional Analysis:                                    LVEF: 47 % (normal is 47 - 59)                                               LVED Volume: 195 ml (normal is 91 - 155)                                     LVES Volume: 104 ml (normal is 40 - 78)                                                                                                                   Impression: ABNORMAL MYOCARDIAL PERFUSION                                    1. There is evidence for mild to moderate myocardial ischemia in the         septal wall of the left ventricle, and moderate myocardial ischemia in       the anteroapical wall of the left ventricle with associated stress           induced LV cavity dilatation.                                                2. There is mild intensity fixed defect in the lateral wall of the left      ventricle, consistent with myocardial injury.                                3. There is abnormal wall motion at rest showing severe hypokinesis of       the septal wall of the left ventricle.                                       4. Resting LV function is  normal.  (normal is 47 - 59)                       5. The left ventricular volume is moderately increased at rest.              6. The extracardiac distribution of radioactivity is normal.    Subsequent angiogram, 5/2012:  ANGIOGRAPHIC RESULTS:                                                                                                                                  DIAGNOSTIC:                                                                  Patient has a right dominant coronary artery.                                                                                                             - Left Main Coronary Artery:                                                 The LM is occluded. There is JOURDAN 0 flow.                                                                                                                 - Left Anterior Descending Artery:                                           The LAD has luminal irregularities. There is JOURDAN 3 flow. Fed                from the LIMA graft                                                                                                                                       - Left Circumflex Artery:                                                    The LCX was not found.                                                                                                                                    - Right Coronary Artery:                                                     The RCA has luminal irregularities. There is JOURDAN 3 flow.                    appear to be fed from SVG, could not cannulate, poorly visualized.                                                                                        - Aortic Root:                                                               The Aortic Root is normal. There is JOURDAN 3 flow.                             Lesion Details: Moderate proximal tortuosity, mild to                        moderate calcification.                                                                                                                                    - HERRON To LAD:                                                               The LIMA to LAD is normal. There is JOURDAN 3 flow.                                                                                                                                                                                       D. SUMMARY:                                                                                                                                               1. Three vessel coronary artery disease.                                     2. Normal LVEF.                                                              3. Mild aortic insufficiency.                                                4. Very difficult study                                                      5. Multiple attempts to cannulate SVG which appears to go to the RCA.        Native RCA appears to have an ostial occlusion.    Was continued on optimal medical management. Could not tolerate atenolol due to severe weakness. Recently without any cardiac complaints. Limited by left knee with soreness and pains. Went to [facility] in [place], [place] and told of the prior implant was too small and not stable. Anticipating re-op by Dr. Fox, a [employer] graduate, in [place]. [place].    Since visit of 10/2/2012, underwent left TKR with good results, had 16 weeks of rehab without much problem. Here today due to hypertension. Home record showed -185/83-99. Noted more dizziness when the pressures are high. Problem is helped by Meclizine. Have decreased exercise following the operation. Diet said to be no salt. No problem with medications,  need 90 days supply.    Since visit of 4/4, concern about his kidney, see telephone enc on 4/22 with BUN of 28, Scr 1.4, K+ 4.1, and eGFR 50. Old record reviewed, no significant julian in function since 6/2011. Agree  now to try HCTZ 12.5 mg every other day along with increase in lisinopril to 80 mg daily. Home BP reviewed 159-189/85-99. No other new problem. Wants comprehensive blood work the next visit.    Since visit of 12/13, left sided CP is gone, appears to be due to straining while carrying a 34 lbs grandson. Discussed cath report and send to [facility]. Home /80. Daughter feels he is stressed, personally do not feel so. Would like to be back in [place] but not family are here. Ill younger sister in [place]. Biking 3 times a week for 15 to 30 minutes twice a day. Do little weights every day.  Lexiscan, 12/19/2013  Nuclear Quantitative Functional Analysis:   LVEF: 42 % (normal is 47 - 59)  LVED Volume: 193 ml (normal is 91 - 155)  LVES Volume: 112 ml (normal is 40 - 78)    Impression: ABNORMAL MYOCARDIAL PERFUSION  1. There is evidence for mild myocardial ischemia in the anterior and lateral apical walls of the left ventricle with associated stress induced LV cavity dilatation.   2. The perfusion scan is free of evidence for myocardial injury.   3. There is abnormal wall motion at rest showing moderate global hypokinesis of the left ventricle.   4. There is resting LV dysfunction with a reduced ejection fraction of 42 %.  (normal is 47 - 59)  5. The left ventricular volume is moderately increased at rest.   6. The extracardiac distribution of radioactivity is normal.   7. When compared to the previous study from 04/12/2012, the LVEF have decreased with global hypokinesia.  8. [facility] SSS =2 =SDS, suggest that 2.5% of myocardium may be ischemic.    ECHO, 12/2013, CONCLUSIONS     1 - Biatrial enlargement.     2 - Enlarged left ventricular enlargement.     3 - Eccentric hypertrophy.     4 - Low normal to mildly depressed left ventricular systolic function (EF 50-55%).     5 - Left ventricular diastolic dysfunction.     6 - Mild mitral regurgitation.     7 - Normal right ventricular systolic function .     8 - Mild to  moderate aortic regurgitation.     9 - Some improvement in LV function with reduction in AR from echo on 9/29/2012.  Since visit of 5/2, had to go to [place] for sister, 60 year old with a CVA. Stopped HCTZ for 5 weeks due to traveling. No CP but some return of indigestion, previously helped by Prilosec. Used for about 4 weeks with benefit. Discuss Rx 4-8 weeks treatments are reasonable. Blood work on 6/3 LDL is 113, , with HDL of 33. Cr. Remains stable at 1.4 with K+ 4.1. PSA is elevated to > 4 on finasteride 5 mg for past 10 years, prescribed by Urologist in [place].  CBC is normal.    Since visit of 6/11, did not get the referral to Urology, also had recent fever and chills with lower abdominal pain while in [place] last Thursday to Friday, noted some weakened stream. Also at night can hear strong heart beat and take BP showing , would then take an additional 40 mg of lisinopril on top of 80 mg each AM. Blood work showed first time elevation of Scr to 1.6 without change in BUN and normal K+.     Since visit of 7/11, had problem with spironolactone, took only 2 doses and had severe dizziness for 2 days, also had to adjust timing of medications to avoid dizziness after medications. 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 Sleeping well. Other medications are fine, compliant. Blood work showed slight rise in Scr to 1.5-1.6 from 1.4 after spironolactone. Saw Dr. Herring and given antibiotic. BP at home 140-160/80.    Since visit of 8/15, feeling fine, no problem with medications, home BP similar with systolic of 140-150. 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 No problem note, no limits. Using Tylenol 500 mg BID for OA, helpful. Sleeping well, tried Melatonin and now just using warm milk at HS. Blood work reviewed, normal testosterone, uric acid 6.5, BMP with stable Cr of 1.5 with FBS of 103, Lipid panel shows LDL of  "78.8 on 80 mg of atorvastatin.     Since visit of 9/17, saw Dr. Calderon for pre-op evaluation and suggested sooner follow up for abnormal stress test. Denies any CP but with occasional chest "congestion" with pleuritic CP with deep breathing, Occurs now and then, sometime related to heavy exertion. Helped with breathing Rx in hospital and albuterol inhaler at home. Last spell about a month ago, lasted for 30 minutes. Had OP left operation on 11/25, no problem. Since home no problem. CXR on 11/6 was normal. Recent labs - LDL 86, HDL 36, normal CMP and CBC.  Serum Cr 1.4, eGFR 49.1, stable. Request nebulizer for home use.    Since visit of 12/10, next morning woke up with high BP, 184/102, felt dizzy, no fall, then a constant heart "hurting", grade 5/10, seems to increase after meal, tried Rolaids and Tums without much help. Pain constant til now. Call answering service at 6 AM and advised to come to office for EKG and evaluation. EKG NSR with frequent APC, rate of 68, LVH with STT abnormalities, no acute change. Discussed atypical presentation of heart pains and also possible GI source of problem. Have not seen any GI specialist for his GERD. Also discussed use of NTG for chest pains. BP at home this AM, 174/100, obviously distressed.    Since visit of 1/9/2014, continue with some back pain over past 6 months, concern possibly due to high dose atorvastatin. Also noted some fatigue with palpitation in the middle of the night, have to get up 3 times for nocturia. No CP but BP elevated in the middle of night to 140/92. Last Holter in 4/2012: PREDOMINANT RHYTHM  1. Sinus rhythm with heart rates varying between 40 and 195 bpm with an average of 77 bpm.     VENTRICULAR ARRHYTHMIAS  1. There were occasional PVCs totalling 290 and averaging 12 per hour.  There were 2 couplets.    2. There were no episodes of ventricular tachycardia.    SUPRA VENTRICULAR ARRHYTHMIAS  1. There were very frequent PACs totalling 5254 and " averaging 228 per hour.  There were 143 couplets.    2. There were no episodes of sustained supraventricular tachycardia.    SINUS NODE FUNCTION  1. There was no evidence of high grade SA nicolás block.     AV CONDUCTION  1. There was no evidence of high grade AV block.     DIARY  1. The diary was not returned    MISCELLANEOUS  1. This was a tape of adequate length (23 hrs).    Also worries about friend in [place] dying without a definite cause. Some worries of kidney and liver due to medications. Last labs in 11/2013 reviewed.     Since visit of 3/17, no new problem, no further CP,   Holter done without symptoms - PREDOMINANT RHYTHM  1. Sinus arrhythmia with heart rates varying between 41 and 164 bpm with an average of 79 bpm.     VENTRICULAR ARRHYTHMIAS  1. There were occasional mostly monomorphic PVCs totalling 272 and averaging 11 per hour.  There was 1 couplet.    2. There were no episodes of ventricular tachycardia.    SUPRA VENTRICULAR ARRHYTHMIAS  1. There were frequent PACs totalling 1748 and averaging 72 per hour.  There were 9 bigeminal cycles.  There were 47 couplets. There were 5 triplets.     2. 1.5% of complexes.    3. There were no episodes of sustained supraventricular tachycardia.    SINUS NODE FUNCTION  1. There was no evidence of high grade SA nicolás block.     AV CONDUCTION  1. There was no evidence of high grade AV block.     2. The longest RR interval was 2110 msec.     DIARY  1. The diary was returned, but not completed    MISCELLANEOUS  1. This was a tape of adequate length (24 hrs).    Labs showed new glucose intolerance, , admit to using lot of sugar recently. CK is elevated with back pain. Last Lipid in 11/2013 LDL-C was 86.4. Sleep evaluation next month.     Since visit of 3/28/2014, stressed due to close sister illnesses with Alzheimer in [place], stayed there for 8 weeks and recent return with weight gain. No definite muscular pains still with mild chronic low back pain. CP is better,  occasional burning.  Home BP reviewed, mostly > 150/80, have occasional HA, every other week. Using HCTZ 25 mg , half tab every other day. Not yet to sleep evaluation. Discussed potential cause of resistant HTN due to untreated GRICELDA.  Labs reviewed CPK remains elevated 362 to 421, FBS improved from 127 to 110. Renal stable with Cr 1.6 to 1.5, K+ 4.1. Lipid LDL-C 69.8. Discussed optional of trying 10 mg of atorvastatin or continuing on 40 mg and be aware of muscle pains / weakness and to monitor CPK.    Since visit of 6/26, no new problem, labs showed slow progressive CKD eGFR now 40, Cr 1.7, BUN 25, . Want to see nephrologist.  Had Sleep study on 6/30 - CONCLUSION:  Nocturnal polysomnography demonstrates:  1.  Obstructive sleep apnea to be present with 44.2 events an hour with    desaturation to 81%.  2.  Sinus rhythm with occasional PVC.     PLAN:  He will return for nasal CPAP titration at a later date.    Since visit of 7/25, OK til 3 weeks ago, started to experience ARMAS, any rushing, similar to prior to CABG. Some CP, more like burning, grade 1/10, last until relax. ECG today SA, rate 56. 1st degree AVB, LVH with ST abnormalities. Called for earlier appointment. No problem with the medications. Just started CPAP this past Sunday, 3 days ago. Last lab again reviewed - K+ 4.1. Had prior problem with spironolactone. Home /95.     Since visit of 9/10, the CP and ARMAS have improved. Could not tolerate eplerenone 25 mg due to marked bradycardia, rate to 40 along with tiredness and weakness, not the expected side effects. Home /79, feeling better except for dry cough, nasal congestion, and bad HA, recurrent problem over the years. Best Rx with short course of Augmentin. Given Doxycycline without benefit. Lab today , BMP with Cr stable at 1.6, K+ 4.0. Also wants review with GI on GERD, and not able to lose weight.    Since visit of 9/24/2014, no new problem, no problem with medications. GRICELDA  reviewed, troubled by being awaken hourly during testing. Denies any fatigue, lots of energy. Labs - stable CKD eGFR 42, , K+ 4.1. Last urine 6/2013. Home BP good at 140/80-85. 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     Since visit of 12/5/2014, had problem with diverticulitis last month now corrected diet, no nuts, more fiber. Denies any CP nor SOB. No sleepiness. Weight up and down. Recent labs A1C 6.2%, LDL-C 59, Hgb 13.3, CMP showed eGFR 39 with Cr 1.7, K+ 3.8.    Since visit of 3/19/2015, no new problem. Had review with Dr. Álvarez, X-ray negative and completed 6 weeks of PT without much benefit. Being closely followed by Dr. Hodgson, recent labs shows eGFR 39, Cr 1.7, have proteinuria, , mild anemia, Hgb 13.4, iron profile is sufficient. Lipid excellent, LDL 58, non-HDL 89.    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 Today discomfort started after 3 zandra, had to stop for 20 minutes, did not use NTG. Similar problem over the last 8 months. Compliant with medications but home BP are high, 130-188/, HR 65-85. ECG shows NSR PAC, LVH, pulmonary pattern. Last lipid in 8/2015 LDL 58.2, non-HDL 90. Active biking twice a week for 30 minutes then farm walk daily.    Since visit of 2/18, continue to have occasional chest burning when rushing fast across pasture or lifting heavy bags. Has about 5 minutes of discomfort, grade 1/10. Also noted some nighttime dry cough, don't believe due to Lisinopril, like nasal congestion with PND. Daughter have Zyrtec, will try. Discussed options for Rx of Abnormal stress test, had difficult LHC in 2012 along with stage 3 CKD. Will proceed with optimize medical Rx first.   ECHO, 3/2016 CONCLUSIONS     1 - Biatrial enlargement.     2 - Enlarged left ventricular enlargement.     3 - Eccentric hypertrophy.     4 - Low normal to mildly depressed left ventricular systolic function (EF  50-55%).     5 - Mild to moderate aortic regurgitation.     6 - Mild mitral regurgitation.     7 - Left ventricular diastolic dysfunction. E/e'(lat) is 10.  This along with the following abnormalities (ATUL = 49.13 and LVMI = 181.70) suggests significant diastolic dysfunction.     8 - Right ventricular enlargement with mildly depressed systolic function.     9 - The estimated PA systolic pressure is 28 mmHg.     10 - No significant change from Echo on 12/19/2013.     Lexiscan - Nuclear Quantitative Functional Analysis:   LVEF: 34 % (normal is 47 - 59)  LVED Volume: 192 ml (normal is 91 - 155)  LVES Volume: 126 ml (normal is 40 - 78)    Impression: ABNORMAL MYOCARDIAL PERFUSION  1. There is evidence for moderate myocardial ischemia in the inferolateral wall of the left ventricle with associated stress induced LV cavity dilatation with underlying injury present.   2. There is abnormal wall motion at rest showing moderate global hypokinesis of the left ventricle. severe hypokinesis of the inferolateral wall of the left ventricle.   3. There is resting LV dysfunction with a reduced ejection fraction of 34 %.  (normal is 47 - 59)  4. The left ventricular volume is mildly increased at rest.   5. The extracardiac distribution of radioactivity is normal.   6. When compared to the previous study from 12/19/2013, current study indicate inferolateral defects.  7. [employer] SSS=10, SRS=5, and SDS=5, suggest that 7% of myocardium may be ischemic.    Since visit of 3/14, feeling better, wanted no change with medications, long discussion on use of Coreg and need for titration. Also labs reviewed, mild anemia due to CKD, stage 3, eGFR 33, decreased from 40, Dr. Hodgson recommend better cholesterol control. , and non- on 80 mg of Atorvastatin. Want better diet.    Since visit of 4/25 had problem on 5/2/2016 with sudden onset of vertigo and right ear pain. Had review with Dr. Vasquez and medications changes recommended  "see telephone encounter note. Now review medications again and vertigo improved after taking Meclizine 4-25 mg tabs daily. Home BP log 127-170/, HR 63-82. Currently back on Coreg 3.125 mg bid. Discuss hope to proceed with Coreg titration with the goal of 25 mg bid.    In 6/2016, had tangled with the trees with review by Dr. Vasquez, right ribs bruised, no fracture, also vertigo with quick turn, fell. Still with some exertional chest burning, average twice a week, but very brief, just seconds. No problem with medications. Had review with Dr. Nails.    In 7/2016, multiple complains, generalized itching started about a week ago, stopped Coreg but itching still there. Also problem with recurrent vertigo exacerbated by head turning or bending, no fall but bothersome, able to do all farm chores. In addition noted to be more tired with the higher dose of Coreg. No SOB nor CP. Recent labs show NICK with Cr 2.2 to 2.5, eGFR down to 24. Will be seeing nephrologist 8/2/2016.    In 12/2016, problem with acute head congestion, frontal, seasonal, usually helped with Augmentin for 10 days. Requesting Rx, option discussed. Feeling "good" in general, able to do work without problem. Ran out of Zetia over a month ago. Lipid test LDL 84.4 on 40 mg of atorvastatin. Home /75-80. Dr. Vasquez had changed ACE-I to Valsartan due to cough.    In 3/2017, find a little tiredness and daytime sleepiness over the last 2 weeks, change to daylight saving made a little worst. 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 Sleep well, 6-8 hours, feel good on awakening. Home /80. Compliant with medications. Some concern of more frequent BM, 4-5 times daily. Eating more fruit. Lipid LDL 62.    In 6/2017, note lot of urine after kidney biopsy about 4 weeks ago. Biopsy reported as OK. Had stomach biopsy about a week ago, since then been feeling "yukki", was informed to expect it. Home BP is similar to office reading, Remain " active on farm, lot of manual work no problem. No more CP nor ARMAS. ECG today suggest WAP, 64, left axis, LVH, PRWP and high lateral T-wave inversion no change from ECG in 2015. No problem with medications. First cousin age 46 yo,  of massive MI, no symptoms.    In 2017, noted bad itching in both legs for about 2 months, not controlled with topical Rx including steroid. Off Valsartan for 3 days without any changed but BP elevated to 150/90, restarted 4 days ago. Half life of 6 hours: therefore adequate off trial. Also have problem with insomnia, Ambien was helpful before. Home BP this am 140/73. No other problem. Will restart Valsartan first, wants to use up Lisinopril supply, advised to first use up current Valsartan then can retry the Lisinopril, understand side effect of dry cough. Vertigo improved post sinus balloon procedure.  Echo 2017  CONCLUSIONS     1 - Mildly enlarged aortic root.     2 - Biatrial enlargement.     3 - Concentric hypertrophy.     4 - No wall motion abnormalities.     5 - Normal left ventricular systolic function (EF 55-60%).     6 - Mild aortic regurgitation.     7 - Mild mitral regurgitation.     8 - Indeterminate LV diastolic function.     9 - Normal right ventricular systolic function .     10 - The estimated PA systolic pressure is greater than 25 mmHg.     11 - Suggest some improvement in LVEF from Echo in 3/2016.       Review of Systems      Constitutional: no further chills, fevers, and sweats and recurrent afternoon fatigue, no further head congestion, weight loss of 2 lbs since last visit.  Eyes: negative for icterus, redness and visual disturbance  Respiratory: negative for asthma, no further cough with seasonal sinusitis, no dyspnea on exertion, no hemoptysis, pleurisy/chest pain and wheezing, Clare score 0 now 5  Cardiovascular: no further chest pain, chest pressure/discomfort, dyspnea, near-syncope, no further nightly palpitations with less occasional /90, no  "paroxysmal nocturnal dyspnea and syncope  Gastrointestinal: negative for abdominal pain, nausea and vomiting, no further heart burn on exertion, BM more frequent. Recent black stool post biopsy.  Musculoskeletal: No muscle weakness and myalgias, positive for arthralgias, left knee not as good, benefited from operation, have muscle cramp in the left leg post op  Neurological: negative for coordination problems, no further dizziness after medications, takes in interval, no gait problems, less headaches, likely sinus, no paresthesia, tremors and vertigo, sleeping 8 hours nightly.  Psych: no depression nor anxious, close sister (71 year old) passed in [place] 1/2016.    Objective:    Physical Exam     Constitutional: He appears healthy. No distress.   HENT:   Mouth/Throat: Dentition is normal.   Eyes: Conjunctivae are normal.   Neck: Thyroid normal. Neck supple.   Cardiovascular: Normal rate, regular rhythm and normal pulses. PMI is not displaced. Exam reveals no gallop.   Medium-pitched machinery crescendo-decrescendo early systolic murmur is present with a grade of 3/6 at the upper right sternal border, upper left sternal border and apex   Pulses:   Carotid pulses are 2+ on the right side, and 2+ on the left side.   Dorsalis pedis pulses are 2+ on the right side, and 2+ on the left side.   Pulmonary/Chest: Breath sounds normal. He exhibits no tenderness.   Abdominal: Soft, very active bowel sounds are normal. Waist 44.5" increased to 46.5"  Musculoskeletal: He exhibits trace pitting edema around the sock line.   Neurological: He is alert and oriented to person, place, and time. Gait normal.   Skin: Skin is warm and dry. No cyanosis. No pallor. Nails show no clubbing.     Assessment:       1. LV dysfunction, EF 50%, reduced to 34%    2. Sleep disorder    3. Essential hypertension    4. Coronary artery disease involving native coronary artery of native heart without angina pectoris    5. LVH (left ventricular " hypertrophy) due to hypertensive disease, without heart failure    6. Obesity, Class I, BMI 32.8 to 34.3, 32.7, down to 31.5    7. Itching, both legs, onset 7/2017    8. Benign prostatic hyperplasia with urinary obstruction         Plan:   Micheal was seen today for follow-up.    Diagnoses and associated orders for this visit:    LV dysfunction, EF 50%, reduced to 34%    Sleep disorder  -     zolpidem (AMBIEN) 5 MG Tab; Take 1 tablet (5 mg total) by mouth nightly as needed.  Dispense: 30 tablet; Refill: 3    Essential hypertension    Coronary artery disease involving native coronary artery of native heart without angina pectoris    LVH (left ventricular hypertrophy) due to hypertensive disease, without heart failure    Obesity, Class I, BMI 32.8 to 34.3, 32.7, down to 31.5    Itching, both legs, onset 7/2017    Benign prostatic hyperplasia with urinary obstruction  -     terazosin (HYTRIN) 5 MG capsule; Take 2 capsules (10 mg total) by mouth every evening.  Dispense: 90 capsule; Refill: 3    Other orders  -     valsartan (DIOVAN) 320 MG tablet; Take 1 tablet (320 mg total) by mouth once daily.  Dispense: 90 tablet; Refill: 3    - CV status stable, continue current Rx, all medications reviewed, patient acknowledge good understanding.   Instruction for Mediterranean diet and heart healthy exercise given.  - Weigh twice weekly, try to lose 1-2 lbs per week  - Belly exercise daily  - Follow up in 3 months per patient's preference  -     Reduce carvedilol (COREG) 6.25 MG tablet; Take 1 tablet (6.25 mg total) by mouth 2 (two) times daily with meals.  Dispense: 90 tablet; Refill: 3    Vertigo - felt may be due to chronic sinusitis    Chronic fatigue - improved    Benign non-nodular prostatic hyperplasia without lower urinary tract symptoms  -     Change terazosin (HYTRIN) 10 MG capsule; Take 2 capsules (20 mg total) by mouth every evening.  Dispense: 180 capsule; Refill: 3    Angina of effort, onset 6/2015 -  resolved    Abnormal cardiovascular stress test  - Check home blood pressure, 2 days weekly, do 2 readings within 5 minutes in AM and PM, keep log for review.    Proteinuria    Diastolic dysfunction    ARMAS (dyspnea on exertion) - improved    Anemia, mild    Abdominal obesity    OA, post left TKR      Patient Active Problem List   Diagnosis    Osteoarthritis of right knee, L-TKR 10/2012    Nocturia    Hematuria    BPH (benign prostatic hyperplasia)    Carotid stenosis    HTN (hypertension),     Hyperlipidemia, baseline     CAD (coronary artery disease), 2V CABG 2007    Gout, arthritis    Obesity, Class I, BMI 32.8 to 34.3, 32.7, down to 31.5    Vertigo    LVH (left ventricular hypertrophy) due to hypertensive disease    Abnormal cardiovascular stress test    CKD (chronic kidney disease), stage III, eGFR 40, 7/2014    GERD (gastroesophageal reflux disease)    Elevated PSA    Sleep disorder    Acquired hammer toe    Diastolic dysfunction    Abdominal obesity    Back pain, chronic    Glucose intolerance (impaired glucose tolerance)    Anemia, mild    Gastric nodule    Diverticulitis, 2005, 2015    Personal history of colonic polyps    PSA elevation    DDD (degenerative disc disease), lumbar    LV dysfunction, EF 50%, reduced to 34%    Other spondylosis, lumbar region    Knee pain    NICK (acute kidney injury)    BPH with obstruction/lower urinary tract symptoms    Essential hypertension    Itching, both legs, onset 7/2017     Total face-to-face time with the patient was 25 minutes and greater than 50% was spent in counseling and coordination of care. The above assessment and plan have been discussed at length. Labs and procedure reviewed. Problem List updated. Asked to bring in all active medications / pills bottles with next visit.

## 2017-09-15 ENCOUNTER — TELEPHONE (OUTPATIENT)
Dept: UROLOGY | Facility: CLINIC | Age: 77
End: 2017-09-15

## 2017-09-15 NOTE — TELEPHONE ENCOUNTER
----- Message from Ju Franklin sent at 9/15/2017  1:03 PM CDT -----  Cancel procedure.  Please call [phone]

## 2017-09-18 ENCOUNTER — TELEPHONE (OUTPATIENT)
Dept: UROLOGY | Facility: CLINIC | Age: 77
End: 2017-09-18

## 2017-09-18 NOTE — TELEPHONE ENCOUNTER
Patient does not have anyone who can get him to surgery.  He does have someone to drive him home.    He wants to know if he can drive himself to surgery with another to drive him home.  Patient advised this is ok.  He will keep his surgery date.

## 2017-09-18 NOTE — TELEPHONE ENCOUNTER
----- Message from Latoya Marshall sent at 9/18/2017  8:59 AM CDT -----  Contact: Neopattie Castrojeremy - daughter  Patientis requesting to reschedule  prostrate surgery on  09/26/17 due transporation, contact daughter at [phone].     Thank you

## 2017-09-25 ENCOUNTER — OFFICE VISIT (OUTPATIENT)
Dept: FAMILY MEDICINE | Facility: CLINIC | Age: 77
End: 2017-09-25
Payer: MEDICARE

## 2017-09-25 ENCOUNTER — TELEPHONE (OUTPATIENT)
Dept: UROLOGY | Facility: CLINIC | Age: 77
End: 2017-09-25

## 2017-09-25 VITALS
DIASTOLIC BLOOD PRESSURE: 74 MMHG | HEART RATE: 66 BPM | HEIGHT: 73 IN | BODY MASS INDEX: 31.62 KG/M2 | WEIGHT: 238.56 LBS | TEMPERATURE: 98 F | SYSTOLIC BLOOD PRESSURE: 129 MMHG

## 2017-09-25 DIAGNOSIS — I10 ESSENTIAL HYPERTENSION: ICD-10-CM

## 2017-09-25 DIAGNOSIS — J06.9 UPPER RESPIRATORY TRACT INFECTION, UNSPECIFIED TYPE: Primary | ICD-10-CM

## 2017-09-25 PROCEDURE — 99999 PR PBB SHADOW E&M-EST. PATIENT-LVL IV: CPT | Mod: PBBFAC,,, | Performed by: NURSE PRACTITIONER

## 2017-09-25 PROCEDURE — 99213 OFFICE O/P EST LOW 20 MIN: CPT | Mod: S$PBB,,, | Performed by: NURSE PRACTITIONER

## 2017-09-25 PROCEDURE — 3074F SYST BP LT 130 MM HG: CPT | Mod: ,,, | Performed by: NURSE PRACTITIONER

## 2017-09-25 PROCEDURE — 1159F MED LIST DOCD IN RCRD: CPT | Mod: ,,, | Performed by: NURSE PRACTITIONER

## 2017-09-25 PROCEDURE — 99214 OFFICE O/P EST MOD 30 MIN: CPT | Mod: PBBFAC,PO | Performed by: NURSE PRACTITIONER

## 2017-09-25 PROCEDURE — 3078F DIAST BP <80 MM HG: CPT | Mod: ,,, | Performed by: NURSE PRACTITIONER

## 2017-09-25 PROCEDURE — 1126F AMNT PAIN NOTED NONE PRSNT: CPT | Mod: ,,, | Performed by: NURSE PRACTITIONER

## 2017-09-25 RX ORDER — GUAIFENESIN AND DEXTROMETHORPHAN HYDROBROMIDE 60; 1200 MG/1; MG/1
1 TABLET, EXTENDED RELEASE ORAL 2 TIMES DAILY
COMMUNITY
Start: 2017-09-25 | End: 2017-10-05

## 2017-09-25 NOTE — TELEPHONE ENCOUNTER
----- Message from Georgina Squires sent at 9/25/2017  7:35 AM CDT -----  Contact: patient   Patient calling in regards to his upcoming surgery. He has a very bad head cold. He wants to reschedule. Please advise.  Call back   Thanks!

## 2017-09-25 NOTE — PATIENT INSTRUCTIONS
Adult Self-Care for Colds  Colds are caused by viruses. They can't be cured with antibiotics. However, you can ease symptoms and support your body's efforts to heal itself.  No matter which symptoms you have, be sure to:  · Drink plenty of fluids (water or clear soup)  · Stop smoking and drinking alcohol  · Get plenty of rest    Understand a fever  · Take your temperature several times a day. If your fever is 100.4°F (38.0°C) for more than a day, call your healthcare provider.  · Relax, lie down. Go to bed if you want. Just get off your feet and rest. Also, drink plenty of fluids to avoid dehydration.  · Take acetaminophen or a nonsteroidal anti-inflammatory agent (NSAID), such as ibuprofen.  Treat a troubled nose kindly  · Breathe steam or heated humidified air to open blocked nasal passages.  a hot shower or use a vaporizer. Be careful not to get burned by the steam.  · Saline nasal sprays and decongestant tablets help open a stuffy nose. Antihistamines can also help, but they can cause side effects such as drowsiness and drying of the eyes, nose, and mouth.  Soothe a sore throat and cough  · Gargle every 2 hours with 1/4 teaspoon of salt dissolved in 1/2 cup of warm water. Suck on throat lozenges and cough drops to moisten your throat.  · Cough medicines are available but it is unclear how well they actually work.  · Take acetaminophen or an NSAID, such as ibuprofen, to ease throat pain  Ease digestive problems  · Put fluids back into your body. Take frequent sips of clear liquids such as water or broth. Avoid drinks that have a lot of sugar in them, such as juices and sodas. These can make diarrhea worse. Older children and adults can drink sports drinks.  · As your appetite returns, you can resume your normal diet. Ask your healthcare provider if there are any foods you should avoid.  When to seek medical care  When you first notice symptoms, ask your healthcare provider if antiviral medicines are  appropriate. Antibiotics should not be taken for colds or flu. Also, call your healthcare provider if you have any of the following symptoms or if you aren't feeling better after 7 days:  · Shortness of breath  · Pain or pressure in the chest or belly (abdomen)  · Worsening symptoms, especially after a period of improvement  · Fever of 100.4°F  (38.0°C) or higher, or fever that doesn't go down with medicine  · Sudden dizziness or confusion  · Severe or continued vomiting  · Signs of dehydration, including extreme thirst, dark urine, infrequent urination, dry mouth  · Spotted, red, or very sore throat   Date Last Reviewed: 12/1/2016  © 3255-9558 [employer]. [address] [place] [address], [place] [address]. All rights reserved. This information is not intended as a substitute for professional medical care. Always follow your healthcare professional's instructions.

## 2017-09-25 NOTE — PROGRESS NOTES
Subjective:       Patient ID: Micheal Hunt is a 76 y.o. male.    Chief Complaint: Cough (congestion) and Sinus Problem    Mr. Hunt presents to the clinic today for nasal congestion, post nasal drip and sore throat for the past five days.  Flonase does not help.  No fever or purulent nasal drainage.  He does feel sinus pressure.      URI    This is a new problem. The current episode started in the past 7 days (five days). The problem has been unchanged. There has been no fever. Associated symptoms include congestion, coughing, headaches, rhinorrhea and a sore throat. Pertinent negatives include no abdominal pain, chest pain, diarrhea, dysuria, ear pain, joint pain, joint swelling, nausea, neck pain, plugged ear sensation, rash, sinus pain, swollen glands or wheezing. Treatments tried: flonase. The treatment provided no relief.     Review of Systems   Constitutional: Negative for chills and fever.   HENT: Positive for congestion, postnasal drip, rhinorrhea, sinus pressure and sore throat. Negative for ear pain and sinus pain.    Eyes: Negative for visual disturbance.   Respiratory: Positive for cough. Negative for wheezing.    Cardiovascular: Negative for chest pain.   Gastrointestinal: Negative for abdominal pain, diarrhea and nausea.   Genitourinary: Negative for dysuria.   Musculoskeletal: Negative for joint pain and neck pain.   Skin: Negative for rash.   Neurological: Positive for headaches. Negative for dizziness and light-headedness.       Objective:      Physical Exam   Constitutional: He is oriented to person, place, and time. He appears well-developed and well-nourished. No distress.   HENT:   Head: Normocephalic and atraumatic.   Right Ear: Tympanic membrane and external ear normal.   Left Ear: Tympanic membrane and external ear normal.   Nose: Mucosal edema and rhinorrhea present. Right sinus exhibits no maxillary sinus tenderness and no frontal sinus tenderness. Left sinus exhibits no  maxillary sinus tenderness and no frontal sinus tenderness.   Mouth/Throat: Oropharynx is clear and moist. No oropharyngeal exudate or posterior oropharyngeal erythema.   Eyes: Pupils are equal, round, and reactive to light. Right eye exhibits no discharge. Left eye exhibits no discharge.   Neck: Neck supple. No thyromegaly present.   Cardiovascular: Normal rate and regular rhythm.  Exam reveals no gallop and no friction rub.    No murmur heard.  Pulmonary/Chest: Effort normal and breath sounds normal. No respiratory distress. He has no wheezes. He has no rales.   Lymphadenopathy:     He has no cervical adenopathy.   Neurological: He is alert and oriented to person, place, and time. Coordination normal.   Skin: Skin is warm and dry.   Psychiatric: He has a normal mood and affect. His behavior is normal. Thought content normal.   Vitals reviewed.          Current Outpatient Prescriptions:     allopurinol (ZYLOPRIM) 300 MG tablet, Take 1 tablet (300 mg total) by mouth once daily., Disp: 90 tablet, Rfl: 3    amlodipine (NORVASC) 10 MG tablet, Take 1 tablet (10 mg total) by mouth before dinner., Disp: 90 tablet, Rfl: 3    aspirin (ECOTRIN) 81 MG EC tablet, Take 81 mg by mouth once daily.  , Disp: , Rfl:     atorvastatin (LIPITOR) 80 MG tablet, Take 1 tablet (80 mg total) by mouth once daily., Disp: 90 tablet, Rfl: 3    calcitRIOL (ROCALTROL) 0.25 MCG Cap, , Disp: , Rfl:     calcium-vitamin D 500-125 mg-unit tablet, Take 1 tablet by mouth as needed. , Disp: , Rfl:     carvedilol (COREG) 6.25 MG tablet, Take 1 tablet (6.25 mg total) by mouth 2 (two) times daily with meals., Disp: 90 tablet, Rfl: 3    cyanocobalamin, vitamin B-12, (VITAMIN B-12) 1,000 mcg Subl, Take 1 tablet by mouth daily as needed. Takes 3,000mcg, Disp: , Rfl:     diclofenac sodium (VOLTAREN) 1 % Gel, Apply 2 g topically 2 (two) times daily. To knees, Disp: 2 Tube, Rfl: 3    finasteride (PROSCAR) 5 mg tablet, TAKE 1 TABLET ONCE DAILY., Disp: 90  tablet, Rfl: 3    fluticasone (FLONASE) 50 mcg/actuation nasal spray, INHALE 1 SPRAY BY EACH NARE ONCE DAILY., Disp: 3 Bottle, Rfl: 3    meclizine (ANTIVERT) 25 mg tablet, TAKE 1 TABLET BY MOUTH 3 TIMES A DAY AS NEEDED, Disp: 90 tablet, Rfl: 6    MUCINEX DM 60-1,200 mg Tb12, Take 1 tablet by mouth 2 (two) times daily., Disp: , Rfl:     nitroGLYCERIN (NITROSTAT) 0.4 MG SL tablet, Place 1 tablet (0.4 mg total) under the tongue every 5 (five) minutes as needed for Chest pain., Disp: 25 tablet, Rfl: 4    sodium chloride (SALINE NASAL MIST) 3 % Mist, 1 spray by Nasal route 2 (two) times daily., Disp: , Rfl:     terazosin (HYTRIN) 5 MG capsule, Take 2 capsules (10 mg total) by mouth every evening., Disp: 90 capsule, Rfl: 3    tramadol (ULTRAM) 50 mg tablet, Take 1 tablet (50 mg total) by mouth every 6 (six) hours as needed., Disp: 120 tablet, Rfl: 4    valsartan (DIOVAN) 320 MG tablet, Take 1 tablet (320 mg total) by mouth once daily., Disp: 90 tablet, Rfl: 3    zolpidem (AMBIEN) 5 MG Tab, Take 1 tablet (5 mg total) by mouth nightly as needed., Disp: 30 tablet, Rfl: 3  Assessment:       1. Upper respiratory tract infection, unspecified type    2. Essential hypertension        Plan:       Upper respiratory tract infection, unspecified type  Likely viral infection.  Can use Afrin OTC for nasal congestion x 3 days only.  Steam from shower, hot tea or soup.  Continue Flonase.  Tylenol for sore throat.  -     MUCINEX DM 60-1,200 mg Tb12; Take 1 tablet by mouth 2 (two) times daily.  -     sodium chloride (SALINE NASAL MIST) 3 % Mist; 1 spray by Nasal route 2 (two) times daily.    Essential hypertension   Stable on current medication.    Patient readiness: acceptance and barriers:none    During the course of the visit the patient was educated and counseled about the following:     Hypertension:   Medication: no change.    Goals: Hypertension: Reduce Blood Pressure    Did patient meet goals/outcomes: Yes    The following  self management tools provided: declined    Patient Instructions (the written plan) was given to the patient/family.     Time spent with patient: 15 minutes

## 2017-10-02 ENCOUNTER — DOCUMENTATION ONLY (OUTPATIENT)
Dept: FAMILY MEDICINE | Facility: CLINIC | Age: 77
End: 2017-10-02

## 2017-10-02 ENCOUNTER — OFFICE VISIT (OUTPATIENT)
Dept: FAMILY MEDICINE | Facility: CLINIC | Age: 77
End: 2017-10-02
Payer: MEDICARE

## 2017-10-02 VITALS
SYSTOLIC BLOOD PRESSURE: 134 MMHG | BODY MASS INDEX: 31.53 KG/M2 | OXYGEN SATURATION: 97 % | DIASTOLIC BLOOD PRESSURE: 80 MMHG | TEMPERATURE: 98 F | WEIGHT: 237.88 LBS | HEIGHT: 73 IN | HEART RATE: 47 BPM

## 2017-10-02 DIAGNOSIS — J01.11 ACUTE RECURRENT FRONTAL SINUSITIS: Primary | ICD-10-CM

## 2017-10-02 PROCEDURE — 3079F DIAST BP 80-89 MM HG: CPT | Mod: ,,, | Performed by: PHYSICIAN ASSISTANT

## 2017-10-02 PROCEDURE — 1159F MED LIST DOCD IN RCRD: CPT | Mod: ,,, | Performed by: PHYSICIAN ASSISTANT

## 2017-10-02 PROCEDURE — 3075F SYST BP GE 130 - 139MM HG: CPT | Mod: ,,, | Performed by: PHYSICIAN ASSISTANT

## 2017-10-02 PROCEDURE — 1125F AMNT PAIN NOTED PAIN PRSNT: CPT | Mod: ,,, | Performed by: PHYSICIAN ASSISTANT

## 2017-10-02 PROCEDURE — 99999 PR PBB SHADOW E&M-EST. PATIENT-LVL IV: CPT | Mod: PBBFAC,,, | Performed by: PHYSICIAN ASSISTANT

## 2017-10-02 PROCEDURE — 99213 OFFICE O/P EST LOW 20 MIN: CPT | Mod: S$PBB,,, | Performed by: PHYSICIAN ASSISTANT

## 2017-10-02 PROCEDURE — 99214 OFFICE O/P EST MOD 30 MIN: CPT | Mod: PBBFAC,PO | Performed by: PHYSICIAN ASSISTANT

## 2017-10-02 PROCEDURE — 96372 THER/PROPH/DIAG INJ SC/IM: CPT | Mod: PBBFAC,PO

## 2017-10-02 RX ORDER — AMOXICILLIN AND CLAVULANATE POTASSIUM 875; 125 MG/1; MG/1
1 TABLET, FILM COATED ORAL 2 TIMES DAILY
Qty: 20 TABLET | Refills: 0 | Status: SHIPPED | OUTPATIENT
Start: 2017-10-02 | End: 2017-11-16

## 2017-10-02 RX ORDER — TRIAMCINOLONE ACETONIDE 1 MG/G
CREAM TOPICAL
COMMUNITY
Start: 2017-10-02 | End: 2018-02-15

## 2017-10-02 RX ORDER — HYDROXYZINE HYDROCHLORIDE 25 MG/1
TABLET, FILM COATED ORAL
COMMUNITY
Start: 2017-10-02 | End: 2018-02-15

## 2017-10-02 RX ORDER — BETAMETHASONE SODIUM PHOSPHATE AND BETAMETHASONE ACETATE 3; 3 MG/ML; MG/ML
6 INJECTION, SUSPENSION INTRA-ARTICULAR; INTRALESIONAL; INTRAMUSCULAR; SOFT TISSUE
Status: COMPLETED | OUTPATIENT
Start: 2017-10-02 | End: 2017-10-02

## 2017-10-02 RX ADMIN — BETAMETHASONE SODIUM PHOSPHATE AND BETAMETHASONE ACETATE 6 MG: 3; 3 INJECTION, SUSPENSION INTRA-ARTICULAR; INTRALESIONAL; INTRAMUSCULAR at 12:10

## 2017-10-02 NOTE — PROGRESS NOTES
Subjective:       Patient ID: Micheal Hunt is a 77 y.o. male.    Chief Complaint: Sinusitis    Sinusitis   This is a recurrent problem. The current episode started 1 to 4 weeks ago (3 weeks ). The problem has been gradually worsening since onset. There has been no fever. The pain is moderate. Associated symptoms include congestion, coughing and sinus pressure. Pertinent negatives include no chills, diaphoresis, ear pain, shortness of breath or sore throat. Past treatments include oral decongestants and spray decongestants. The treatment provided no relief.     Review of Systems   Constitutional: Positive for activity change and fatigue. Negative for appetite change, chills, diaphoresis and fever.   HENT: Positive for congestion, rhinorrhea, sinus pain and sinus pressure. Negative for ear pain, hearing loss and sore throat.    Respiratory: Positive for cough. Negative for chest tightness, shortness of breath and wheezing.    Cardiovascular: Negative for chest pain, palpitations and leg swelling.   Gastrointestinal: Negative for abdominal pain, diarrhea, nausea and vomiting.   Musculoskeletal: Negative for arthralgias.   Skin: Negative for rash.   Neurological: Negative for dizziness and light-headedness.       Objective:      Physical Exam   Constitutional: He appears well-developed and well-nourished. No distress.   HENT:   Head: Normocephalic and atraumatic.   Right Ear: Tympanic membrane, external ear and ear canal normal.   Left Ear: Tympanic membrane, external ear and ear canal normal.   Nose: Mucosal edema present. No rhinorrhea. Right sinus exhibits maxillary sinus tenderness and frontal sinus tenderness. Left sinus exhibits maxillary sinus tenderness and frontal sinus tenderness.   Mouth/Throat: No oropharyngeal exudate, posterior oropharyngeal edema or posterior oropharyngeal erythema.   Cardiovascular: Normal rate, regular rhythm and normal heart sounds.    Pulmonary/Chest: Effort normal.   Vitals  reviewed.      Assessment:       1. Acute recurrent frontal sinusitis        Plan:       Micheal was seen today for sinusitis.    Diagnoses and all orders for this visit:    Acute recurrent frontal sinusitis  -     amoxicillin-clavulanate 875-125mg (AUGMENTIN) 875-125 mg per tablet; Take 1 tablet by mouth 2 (two) times daily.  -     betamethasone acetate-betamethasone sodium phosphate injection 6 mg; Inject 1 mL (6 mg total) into the muscle one time.    mucninex otc  Micheal Hunt was given a handout which discussed their disease process, precautions, and instructions for follow-up and therapy.

## 2017-10-02 NOTE — PATIENT INSTRUCTIONS
Weight Management: Getting Started  Healthy bodies come in all shapes and sizes. Not all bodies are made to be thin. For some people, a healthy weight is higher than the average weight listed on weight charts. Your healthcare provider can help you decide on a healthy weight for you.    Reasons to lose weight  Losing weight can help with some health problems, such as high blood pressure, heart disease, diabetes, sleep apnea, and arthritis. You may also feel more energy.  Set your long-term goal  Your goal doesn't even have to be a specific weight. You may decide on a fitness goal (such as being able to walk 10 miles a week), or a health goal (such as lowering your blood pressure). Choose a goal that is measurable and reasonable, so you know when you've reached it. A goal of reaching a BMI of less than 25 is not always reasonable (or possible).   Make an action plan  Habits dont change overnight. Setting your goals too high can leave you feeling discouraged if you cant reach them. Be realistic. Choose one or two small changes you can make now. Set an action plan for how you are going to make these changes. When you can stick to this plan, keep making a few more small changes. Taking small steps will help you stay on the path to success.  Track your progress  Write down your goals. Then, keep a daily record of your progress. Write down what you eat and how active you are. This record lets you look back on how much youve done. It may also help when youre feeling frustrated. Reward yourself for success. Even if you dont reach every goal, give yourself credit for what you do get done.  Get support  Encouragement from others can help make losing weight easier. Ask your family members and friends for support. They may even want to join you. Also look to your healthcare provider, registered dietitian, and  for help. Your local hospital can give you more information about nutrition, exercise, and  weight loss.  Date Last Reviewed: 1/31/2016 © 2000-2017 [employer]. [address] [place] [address], [place] [address]. All rights reserved. This information is not intended as a substitute for professional medical care. Always follow your healthcare professional's instructions.        Walking for Fitness  Fitness walking has something for everyone, even people who are already fit. Walking is one of the safest ways to condition your body aerobically. It can boost energy, help you lose weight, and reduce stress.    Physical benefits  · Walking strengthens your heart and lungs, and tones your muscles.  · When walking, your feet land with less impact than in other sports. This reduces chances of muscle, bone, and joint injury.  · Regular walking improves your cholesterol levels and lowers your risk of heart disease. And it helps you control your blood sugar if you have diabetes.  · Walking is a weight-bearing activity, which helps maintain bone density. This can help prevent osteoporosis.  Personal rewards  · Taking walks can help you relax and manage stress. And fitness walking may make you feel better about yourself.  · Walking can help you sleep better at night and make you less likely to be depressed.  · Regular walking may help maintain your memory as you get older.  · Walking is a great way to spend extra time with friends and family members. Be sure to invite your dog along!  Q&A about fitness walking  Q: Will walking keep me fit?  A: Yes. Regular walking at the right pace gives you all the benefits of other aerobic activities, such as jogging and swimming.  Q: Will walking help me lose weight and keep it off?  A: Yes. Per mile, walking can burn as many calories as jogging. Your health care provider can help work walking into your weight-loss plan.  Q: Is walking safe for my health?  A: Yes. Walking is safe if you have high blood pressure, diabetes, heart disease, or other conditions. Talk to your  healthcare provider before you start.  Date Last Reviewed: 4/1/2017  © 1935-0140 [employer]. [address] [place] [address], [place] [address]. All rights reserved. This information is not intended as a substitute for professional medical care. Always follow your healthcare professional's instructions.        Controlling High Blood Pressure  High blood pressure (hypertension) is often called the silent killer. This is because many people who have it dont know it. High blood pressure is defined as 140/90 mm Hg or higher. Know your blood pressure and remember to check it regularly. Doing so can save your life. Here are some things you can do to help control your blood pressure.    Choose heart-healthy foods  · Select low-salt, low-fat foods. Limit sodium intake to 2,400 mg per day or the amount suggested by your healthcare provider.  · Limit canned, dried, cured, packaged, and fast foods. These can contain a lot of salt.  · Eat 8 to 10 servings of fruits and vegetables every day.  · Choose lean meats, fish, or chicken.  · Eat whole-grain pasta, brown rice, and beans.  · Eat 2 to 3 servings of low-fat or fat-free dairy products.  · Ask your doctor about the DASH eating plan. This plan helps reduce blood pressure.  · When you go to a restaurant, ask that your meal be prepared with no added salt.  Maintain a healthy weight  · Ask your healthcare provider how many calories to eat a day. Then stick to that number.  · Ask your healthcare provider what weight range is healthiest for you. If you are overweight, a weight loss of only 3% to 5% of your body weight can help lower blood pressure. Generally, a good weight loss goal is to lose 10% of your body weight in a year.  · Limit snacks and sweets.  · Get regular exercise.  Get up and get active  · Choose activities you enjoy. Find ones you can do with friends or family. This includes bicycling, dancing, walking, and jogging.  · Park farther away from building  entrances.  · Use stairs instead of the elevator.  · When you can, walk or bike instead of driving.  · [place] leaves, garden, or do household repairs.  · Be active at a moderate to vigorous level of physical activity for at least 40 minutes for a minimum of 3 to 4 days a week.   Manage stress  · Make time to relax and enjoy life. Find time to laugh.  · Communicate your concerns with your loved ones and your healthcare provider.  · Visit with family and friends, and keep up with hobbies.  Limit alcohol and quit smoking  · Men should have no more than 2 drinks per day.  · Women should have no more than 1 drink per day.  · Talk with your healthcare provider about quitting smoking. Smoking significantly increases your risk for heart disease and stroke. Ask your healthcare provider about community smoking cessation programs and other options.  Medicines  If lifestyle changes arent enough, your healthcare provider may prescribe high blood pressure medicine. Take all medicines as prescribed. If you have any questions about your medicines, ask your healthcare provider before stopping or changing them.   Date Last Reviewed: 4/27/2016 © 2000-2017 [employer]. [address] [place] [address]. All rights reserved. This information is not intended as a substitute for professional medical care. Always follow your healthcare professional's instructions.        Sinusitis (Antibiotic Treatment)    The sinuses are air-filled spaces within the bones of the face. They connect to the inside of the nose. Sinusitis is an inflammation of the tissue lining the sinus cavity. Sinus inflammation can occur during a cold. It can also be due to allergies to pollens and other particles in the air. Sinusitis can cause symptoms of sinus congestion and fullness. A sinus infection causes fever, headache and facial pain. There is often green or yellow drainage from the nose or into the back of the throat (post-nasal drip). You  have been given antibiotics to treat this condition.  Home care:  · Take the full course of antibiotics as instructed. Do not stop taking them, even if you feel better.  · Drink plenty of water, hot tea, and other liquids. This may help thin mucus. It also may promote sinus drainage.  · Heat may help soothe painful areas of the face. Use a towel soaked in hot water. Or,  the shower and direct the hot spray onto your face. Using a vaporizer along with a menthol rub at night may also help.   · An expectorant containing guaifenesin may help thin the mucus and promote drainage from the sinuses.  · Over-the-counter decongestants may be used unless a similar medicine was prescribed. Nasal sprays work the fastest. Use one that contains phenylephrine or oxymetazoline. First blow the nose gently. Then use the spray. Do not use these medicines more often than directed on the label or symptoms may get worse. You may also use tablets containing pseudoephedrine. Avoid products that combine ingredients, because side effects may be increased. Read labels. You can also ask the pharmacist for help. (NOTE: Persons with high blood pressure should not use decongestants. They can raise blood pressure.)  · Over-the-counter antihistamines may help if allergies contributed to your sinusitis.    · Do not use nasal rinses or irrigation during an acute sinus infection, unless told to by your health care provider. Rinsing may spread the infection to other sinuses.  · Use acetaminophen or ibuprofen to control pain, unless another pain medicine was prescribed. (If you have chronic liver or kidney disease or ever had a stomach ulcer, talk with your doctor before using these medicines. Aspirin should never be used in anyone under 18 years of age who is ill with a fever. It may cause severe liver damage.)  · Don't smoke. This can worsen symptoms.  Follow-up care  Follow up with your healthcare provider or our staff if you are not improving  within the next week.  When to seek medical advice  Call your healthcare provider if any of these occur:  · Facial pain or headache becoming more severe  · Stiff neck  · Unusual drowsiness or confusion  · Swelling of the forehead or eyelids  · Vision problems, including blurred or double vision  · Fever of 100.4ºF (38ºC) or higher, or as directed by your healthcare provider  · Seizure  · Breathing problems  · Symptoms not resolving within 10 days  Date Last Reviewed: 4/13/2015  © 2982-4421 [employer]. [address] [place] [address], [place] [address]. All rights reserved. This information is not intended as a substitute for professional medical care. Always follow your healthcare professional's instructions.

## 2017-10-02 NOTE — PROGRESS NOTES
Pre-Visit Chart Review  For Appointment Scheduled on 10/02/2017    Health Maintenance Due   Topic Date Due    TETANUS VACCINE  [birth year]27/1958    Zoster Vaccine  09/27/1999    Influenza Vaccine  08/01/2017

## 2017-10-05 ENCOUNTER — LAB VISIT (OUTPATIENT)
Dept: LAB | Facility: HOSPITAL | Age: 77
End: 2017-10-05
Attending: INTERNAL MEDICINE
Payer: MEDICARE

## 2017-10-05 DIAGNOSIS — I10 ESSENTIAL HYPERTENSION, MALIGNANT: ICD-10-CM

## 2017-10-05 DIAGNOSIS — N18.30 CHRONIC KIDNEY DISEASE, STAGE III (MODERATE): ICD-10-CM

## 2017-10-05 DIAGNOSIS — N40.0 BENIGN ENLARGEMENT OF PROSTATE: ICD-10-CM

## 2017-10-05 DIAGNOSIS — I25.810 CORONARY ATHEROSCLEROSIS OF AUTOLOGOUS VEIN BYPASS GRAFT: Primary | ICD-10-CM

## 2017-10-05 DIAGNOSIS — N18.4 CHRONIC KIDNEY DISEASE, STAGE IV (SEVERE): ICD-10-CM

## 2017-10-05 DIAGNOSIS — M10.9 PODAGRA: ICD-10-CM

## 2017-10-05 DIAGNOSIS — R53.83 FATIGUE: ICD-10-CM

## 2017-10-05 DIAGNOSIS — M19.90 SENILE ARTHRITIS: ICD-10-CM

## 2017-10-05 LAB
ALBUMIN SERPL BCP-MCNC: 3.6 G/DL
ALP SERPL-CCNC: 96 U/L
ALT SERPL W/O P-5'-P-CCNC: 15 U/L
ANION GAP SERPL CALC-SCNC: 10 MMOL/L
AST SERPL-CCNC: 16 U/L
BASOPHILS # BLD AUTO: 0 K/UL
BASOPHILS NFR BLD: 0.4 %
BILIRUB SERPL-MCNC: 0.3 MG/DL
BUN SERPL-MCNC: 35 MG/DL
CALCIUM SERPL-MCNC: 9.1 MG/DL
CHLORIDE SERPL-SCNC: 108 MMOL/L
CO2 SERPL-SCNC: 23 MMOL/L
CREAT SERPL-MCNC: 3.3 MG/DL
DIFFERENTIAL METHOD: ABNORMAL
EOSINOPHIL # BLD AUTO: 0.1 K/UL
EOSINOPHIL NFR BLD: 0.8 %
ERYTHROCYTE [DISTWIDTH] IN BLOOD BY AUTOMATED COUNT: 14.6 %
EST. GFR  (AFRICAN AMERICAN): 20 ML/MIN/1.73 M^2
EST. GFR  (NON AFRICAN AMERICAN): 17 ML/MIN/1.73 M^2
FERRITIN SERPL-MCNC: 64 NG/ML
GLUCOSE SERPL-MCNC: 100 MG/DL
HCT VFR BLD AUTO: 33 %
HGB BLD-MCNC: 11.1 G/DL
IRON SERPL-MCNC: 67 UG/DL
LYMPHOCYTES # BLD AUTO: 2.4 K/UL
LYMPHOCYTES NFR BLD: 23.6 %
MCH RBC QN AUTO: 30.5 PG
MCHC RBC AUTO-ENTMCNC: 33.6 G/DL
MCV RBC AUTO: 91 FL
MONOCYTES # BLD AUTO: 0.7 K/UL
MONOCYTES NFR BLD: 6.7 %
NEUTROPHILS # BLD AUTO: 6.9 K/UL
NEUTROPHILS NFR BLD: 68.5 %
PHOSPHATE SERPL-MCNC: 3.8 MG/DL
PLATELET # BLD AUTO: 195 K/UL
PMV BLD AUTO: 8 FL
POTASSIUM SERPL-SCNC: 4.1 MMOL/L
PROT SERPL-MCNC: 7 G/DL
PTH-INTACT SERPL-MCNC: 148.5 PG/ML
RBC # BLD AUTO: 3.64 M/UL
SATURATED IRON: 19 %
SODIUM SERPL-SCNC: 141 MMOL/L
TOTAL IRON BINDING CAPACITY: 346 UG/DL
TRANSFERRIN SERPL-MCNC: 234 MG/DL
WBC # BLD AUTO: 10 K/UL

## 2017-10-05 PROCEDURE — 83540 ASSAY OF IRON: CPT

## 2017-10-05 PROCEDURE — 83970 ASSAY OF PARATHORMONE: CPT

## 2017-10-05 PROCEDURE — 82728 ASSAY OF FERRITIN: CPT

## 2017-10-05 PROCEDURE — 80053 COMPREHEN METABOLIC PANEL: CPT

## 2017-10-05 PROCEDURE — 85025 COMPLETE CBC W/AUTO DIFF WBC: CPT

## 2017-10-05 PROCEDURE — 36415 COLL VENOUS BLD VENIPUNCTURE: CPT

## 2017-10-05 PROCEDURE — 84100 ASSAY OF PHOSPHORUS: CPT

## 2017-10-11 ENCOUNTER — DOCUMENTATION ONLY (OUTPATIENT)
Dept: FAMILY MEDICINE | Facility: CLINIC | Age: 77
End: 2017-10-11

## 2017-10-11 NOTE — PROGRESS NOTES
Pre-Visit Chart Review  For Appointment Scheduled on 10/16/17    Health Maintenance Due   Topic Date Due    TETANUS VACCINE  09[birth year]27/1958    Zoster Vaccine  09/27/1999    Influenza Vaccine  08/01/2017

## 2017-10-16 ENCOUNTER — PATIENT MESSAGE (OUTPATIENT)
Dept: FAMILY MEDICINE | Facility: CLINIC | Age: 77
End: 2017-10-16

## 2017-10-17 RX ORDER — COLCHICINE 0.6 MG/1
0.6 TABLET ORAL DAILY
Qty: 30 TABLET | Refills: 11 | Status: SHIPPED | OUTPATIENT
Start: 2017-10-17 | End: 2018-02-15 | Stop reason: ALTCHOICE

## 2017-11-12 DIAGNOSIS — I10 ESSENTIAL HYPERTENSION: ICD-10-CM

## 2017-11-12 DIAGNOSIS — I51.9 LV DYSFUNCTION: ICD-10-CM

## 2017-11-12 RX ORDER — CARVEDILOL 6.25 MG/1
6.25 TABLET ORAL 2 TIMES DAILY WITH MEALS
Qty: 90 TABLET | Refills: 3 | Status: SHIPPED | OUTPATIENT
Start: 2017-11-12 | End: 2018-05-22 | Stop reason: SDUPTHER

## 2017-11-16 ENCOUNTER — OFFICE VISIT (OUTPATIENT)
Dept: FAMILY MEDICINE | Facility: CLINIC | Age: 77
End: 2017-11-16
Payer: MEDICARE

## 2017-11-16 ENCOUNTER — HOSPITAL ENCOUNTER (OUTPATIENT)
Dept: RADIOLOGY | Facility: CLINIC | Age: 77
Discharge: HOME | End: 2017-11-16
Attending: FAMILY MEDICINE
Payer: MEDICARE

## 2017-11-16 VITALS
RESPIRATION RATE: 20 BRPM | HEIGHT: 73 IN | OXYGEN SATURATION: 96 % | DIASTOLIC BLOOD PRESSURE: 73 MMHG | SYSTOLIC BLOOD PRESSURE: 130 MMHG | HEART RATE: 67 BPM | WEIGHT: 246.94 LBS | TEMPERATURE: 98 F | BODY MASS INDEX: 32.73 KG/M2

## 2017-11-16 DIAGNOSIS — R11.0 NAUSEA: ICD-10-CM

## 2017-11-16 DIAGNOSIS — R09.89 RALES: ICD-10-CM

## 2017-11-16 DIAGNOSIS — J18.9 COMMUNITY ACQUIRED PNEUMONIA OF RIGHT MIDDLE LOBE OF LUNG: Primary | ICD-10-CM

## 2017-11-16 DIAGNOSIS — R05.9 COUGH: ICD-10-CM

## 2017-11-16 DIAGNOSIS — R10.32 LLQ ABDOMINAL PAIN: ICD-10-CM

## 2017-11-16 PROCEDURE — 99214 OFFICE O/P EST MOD 30 MIN: CPT | Mod: S$PBB,,, | Performed by: FAMILY MEDICINE

## 2017-11-16 PROCEDURE — 71020 XR CHEST PA AND LATERAL: CPT | Mod: 26,,, | Performed by: RADIOLOGY

## 2017-11-16 PROCEDURE — 99999 PR PBB SHADOW E&M-EST. PATIENT-LVL III: CPT | Mod: PBBFAC,,, | Performed by: FAMILY MEDICINE

## 2017-11-16 PROCEDURE — 96372 THER/PROPH/DIAG INJ SC/IM: CPT | Mod: PBBFAC,PO

## 2017-11-16 PROCEDURE — 99213 OFFICE O/P EST LOW 20 MIN: CPT | Mod: PBBFAC,25,PO | Performed by: FAMILY MEDICINE

## 2017-11-16 PROCEDURE — 71020 XR CHEST PA AND LATERAL: CPT | Mod: TC,PO

## 2017-11-16 RX ORDER — FLUTICASONE PROPIONATE 50 MCG
1 SPRAY, SUSPENSION (ML) NASAL DAILY
Refills: 3 | COMMUNITY
Start: 2017-10-31 | End: 2018-02-21 | Stop reason: SDUPTHER

## 2017-11-16 RX ORDER — ONDANSETRON 8 MG/1
8 TABLET, ORALLY DISINTEGRATING ORAL EVERY 12 HOURS PRN
Qty: 10 TABLET | Refills: 0 | Status: SHIPPED | OUTPATIENT
Start: 2017-11-16 | End: 2018-01-09 | Stop reason: ALTCHOICE

## 2017-11-16 RX ORDER — CEFTRIAXONE 1 G/1
1 INJECTION, POWDER, FOR SOLUTION INTRAMUSCULAR; INTRAVENOUS
Status: COMPLETED | OUTPATIENT
Start: 2017-11-16 | End: 2017-11-16

## 2017-11-16 RX ORDER — AMOXICILLIN AND CLAVULANATE POTASSIUM 875; 125 MG/1; MG/1
1 TABLET, FILM COATED ORAL 2 TIMES DAILY
Qty: 20 TABLET | Refills: 0 | Status: SHIPPED | OUTPATIENT
Start: 2017-11-16 | End: 2017-11-20

## 2017-11-16 RX ADMIN — CEFTRIAXONE SODIUM 1 G: 1 INJECTION, POWDER, FOR SOLUTION INTRAMUSCULAR; INTRAVENOUS at 10:11

## 2017-11-16 NOTE — PROGRESS NOTES
Subjective:       Patient ID: Micheal Hunt is a 77 y.o. male.    Chief Complaint: URI (X 2 days with head/chest congestion, cough with brownish sputum, chills, abd pain,diarrhea)    Cough   This is a new problem. The current episode started in the past 7 days. The problem has been gradually worsening. The cough is productive of purulent sputum (rust colored). Associated symptoms include a fever. Pertinent negatives include no chest pain, rash, shortness of breath or wheezing.     Review of Systems   Constitutional: Positive for fever.   Respiratory: Positive for cough. Negative for shortness of breath and wheezing.    Cardiovascular: Negative for chest pain.   Gastrointestinal: Positive for diarrhea (soft to watery starting yesterday) and nausea. Negative for abdominal pain and vomiting.   Skin: Negative for rash.   Neurological: Negative for numbness.   All other systems reviewed and are negative.      Objective:      Physical Exam   Constitutional: He appears well-developed. No distress.   HENT:   Mouth/Throat: Oropharynx is clear and moist.   Neck: Neck supple.   Cardiovascular: Normal rate and regular rhythm.    No murmur heard.  Pulmonary/Chest: Effort normal. He has rales in the right middle field.   Abdominal: Soft. Bowel sounds are normal. There is tenderness in the left lower quadrant.   Lymphadenopathy:     He has no cervical adenopathy.   Skin: Skin is warm and dry.       Assessment:       1. Cough    2. Rales        Plan:         Micheal was seen today for uri.    Diagnoses and all orders for this visit:    Community acquired pneumonia of right middle lobe of lung  -     cefTRIAXone injection 1 g; Inject 1 g into the muscle one time.  -     amoxicillin-clavulanate 875-125mg (AUGMENTIN) 875-125 mg per tablet; Take 1 tablet by mouth 2 (two) times daily. Take after meals.    Cough  -     X-Ray Chest PA And Lateral; Future  -     CBC auto differential; Future    Rales  -     X-Ray Chest PA And Lateral;  Future  -     CBC auto differential; Future    LLQ abdominal pain  -     amoxicillin-clavulanate 875-125mg (AUGMENTIN) 875-125 mg per tablet; Take 1 tablet by mouth 2 (two) times daily. Take after meals.    Nausea  -     ondansetron (ZOFRAN-ODT) 8 MG TbDL; Take 1 tablet (8 mg total) by mouth every 12 (twelve) hours as needed.

## 2017-11-20 ENCOUNTER — OFFICE VISIT (OUTPATIENT)
Dept: FAMILY MEDICINE | Facility: CLINIC | Age: 77
End: 2017-11-20
Payer: MEDICARE

## 2017-11-20 ENCOUNTER — DOCUMENTATION ONLY (OUTPATIENT)
Dept: FAMILY MEDICINE | Facility: CLINIC | Age: 77
End: 2017-11-20

## 2017-11-20 VITALS
BODY MASS INDEX: 32.54 KG/M2 | WEIGHT: 245.56 LBS | HEIGHT: 73 IN | SYSTOLIC BLOOD PRESSURE: 140 MMHG | DIASTOLIC BLOOD PRESSURE: 74 MMHG

## 2017-11-20 DIAGNOSIS — K52.9 GASTROENTERITIS: Primary | ICD-10-CM

## 2017-11-20 PROCEDURE — 99999 PR PBB SHADOW E&M-EST. PATIENT-LVL II: CPT | Mod: PBBFAC,,, | Performed by: FAMILY MEDICINE

## 2017-11-20 PROCEDURE — 99214 OFFICE O/P EST MOD 30 MIN: CPT | Mod: S$PBB,,, | Performed by: FAMILY MEDICINE

## 2017-11-20 PROCEDURE — 99212 OFFICE O/P EST SF 10 MIN: CPT | Mod: PBBFAC,PO | Performed by: FAMILY MEDICINE

## 2017-11-20 RX ORDER — LOPERAMIDE HYDROCHLORIDE 2 MG/1
2 CAPSULE ORAL 4 TIMES DAILY PRN
Qty: 60 CAPSULE | Refills: 3 | Status: SHIPPED | OUTPATIENT
Start: 2017-11-20 | End: 2017-11-30

## 2017-11-20 RX ORDER — PROMETHAZINE HYDROCHLORIDE 25 MG/1
25 TABLET ORAL EVERY 6 HOURS PRN
Qty: 60 TABLET | Refills: 3 | Status: SHIPPED | OUTPATIENT
Start: 2017-11-20 | End: 2018-01-09 | Stop reason: ALTCHOICE

## 2017-11-20 RX ORDER — DICYCLOMINE HYDROCHLORIDE 10 MG/1
10 CAPSULE ORAL
Qty: 120 CAPSULE | Refills: 0 | Status: SHIPPED | OUTPATIENT
Start: 2017-11-20 | End: 2017-12-20

## 2017-11-20 NOTE — PROGRESS NOTES
Subjective:       Patient ID: Micheal Hunt is a 77 y.o. male.    Chief Complaint: Abdominal Pain    HPI    Abdominal pain  Started approx 2 weeks ago.   He has associated diarrhea. No vomit.   Pt does have some nausea.   His diarrhea is provoked soon after food consumption.   He has been placed on Augmentin for this by a health care worker and has some improvement in symptoms.   Pt reports chills without fever.   No sick contacts identified with similar symptoms.   No blood per rectum.    Colonoscopy results from 9/10/15:  Impression:  - Non-bleeding internal hemorrhoids.                       - Diverticulosis in the sigmoid colon and in the                        descending colon.                       - One 3 mm polyp in the ascending colon. Resected                        and retrieved.                       - Eight 4 to 8 mm polyps in the rectum, in the                        descending colon, in the transverse colon, in the                        distal transverse colon and in the cecum. Resected                        and retrieved.                       - Two 8 to 14 mm polyps in the descending colon and                        in the transverse colon. Resected and retrieved.                       - One 14 mm polyp in the transverse colon. Resected                        and retrieved. Tattooed.                       - The appendiceal orifice and ileocecal valve are                        normal.                       - The examined portion of the ileum was normal.    Review of Systems   Constitutional: Positive for chills. Negative for fever.   Respiratory: Negative for chest tightness and shortness of breath.    Cardiovascular: Negative for chest pain and leg swelling.   Gastrointestinal: Positive for abdominal pain and diarrhea. Negative for constipation and vomiting.   Genitourinary: Negative for decreased urine volume, dysuria and hematuria.   Musculoskeletal: Positive for arthralgias and back  pain.   Neurological: Positive for dizziness. Negative for weakness and light-headedness.       Objective:      Physical Exam   Constitutional: He appears well-developed and well-nourished. No distress.   Obese male in no acute distress.     HENT:   Head: Normocephalic and atraumatic.   Mouth/Throat: Oropharynx is clear and moist. No oropharyngeal exudate.   Eyes: EOM are normal. Pupils are equal, round, and reactive to light.   Neck: Normal range of motion. Neck supple. No thyromegaly present.   Cardiovascular: Normal rate, regular rhythm, normal heart sounds and intact distal pulses.    Pulmonary/Chest: Effort normal and breath sounds normal. No respiratory distress. He has no wheezes.   Abdominal: Soft. Bowel sounds are normal. He exhibits no distension and no mass. There is tenderness.   Diffuse TTP to abdomen.    Musculoskeletal: He exhibits no edema.   Lymphadenopathy:     He has no cervical adenopathy.   Neurological: He is alert.   Skin: Skin is warm. No rash noted. No erythema.   Psychiatric: He has a normal mood and affect. His behavior is normal.   Vitals reviewed.      Assessment:       1. Gastroenteritis        Plan:       1. Gastroenteritis  - promethazine (PHENERGAN) 25 MG tablet; Take 1 tablet (25 mg total) by mouth every 6 (six) hours as needed for Nausea.  Dispense: 60 tablet; Refill: 3  - loperamide (IMODIUM A-D) 2 mg capsule; Take 1 capsule (2 mg total) by mouth 4 (four) times daily as needed for Diarrhea.  Dispense: 60 capsule; Refill: 3  - dicyclomine (BENTYL) 10 MG capsule; Take 1 capsule (10 mg total) by mouth 4 (four) times daily before meals and nightly.  Dispense: 120 capsule; Refill: 0  - H. pylori antigen, stool; Future  - Pancreatic elastase, fecal; Future  - Fecal fat, quantitative; Future  - Occult blood x 1, stool; Future  - WBC, Stool; Future  - Rotavirus antigen, stool; Future  - Adenovirus Antigen EIA, Stool; Future  - Calprotectin; Future  - Giardia / Cryptosporidum, EIA;  Future  - Stool Exam-Ova,Cysts,Parasites; Future  - Clostridium difficile EIA; Future  - Stool culture; Future      Portions of this note were created using Dragon voice recognition software. There may be voice recognition errors found in the text, and attempts were made to correct these errors prior to signature    Christian Anderson MD    Family Medicine  11/20/2017

## 2017-11-20 NOTE — PROGRESS NOTES
Pre-Visit Chart Review  For Appointment Scheduled on (11/20    Health Maintenance Due   Topic Date Due    TETANUS VACCINE  09[birth year]27/1958    Zoster Vaccine  09/27/1999    Influenza Vaccine  08/01/2017

## 2017-11-21 ENCOUNTER — LAB VISIT (OUTPATIENT)
Dept: LAB | Facility: HOSPITAL | Age: 77
End: 2017-11-21
Attending: FAMILY MEDICINE
Payer: MEDICARE

## 2017-11-21 DIAGNOSIS — K52.9 GASTROENTERITIS: ICD-10-CM

## 2017-11-21 PROCEDURE — 82656 EL-1 FECAL QUAL/SEMIQ: CPT

## 2017-11-27 LAB
COLLECT DURATION TIME STL: 24 H
FAT 24H STL-MRATE: <1 G/24 H
PERCENT FAT: ABNORMAL
SPECIMEN WT STL QN: 3 G

## 2017-11-30 LAB
ELASTASE 1, FECAL: >500 UG E/G STOOL
ELASTASE INTERPRETATION: NORMAL

## 2017-12-04 ENCOUNTER — TELEPHONE (OUTPATIENT)
Dept: FAMILY MEDICINE | Facility: CLINIC | Age: 77
End: 2017-12-04

## 2017-12-04 NOTE — TELEPHONE ENCOUNTER
----- Message from Christian Anderson MD sent at 12/1/2017 12:02 PM CST -----  Please inform patient that those studies were negative.  No changes to medications at this time and if there are any questions, to give us a call. Thank You,    Christian Anderson MD  Family Medicine  12/1/2017

## 2017-12-14 ENCOUNTER — TELEPHONE (OUTPATIENT)
Dept: FAMILY MEDICINE | Facility: CLINIC | Age: 77
End: 2017-12-14

## 2017-12-14 NOTE — TELEPHONE ENCOUNTER
Informed pt. We currently have no available appointment today. Pt. States he will just keep his appointment for tomorrow.

## 2017-12-14 NOTE — TELEPHONE ENCOUNTER
----- Message from Heather Corbin RT sent at 12/14/2017  7:39 AM CST -----  Contact: Pt    Pt  / [phone], requesting an appt to be worked in today with Dr. Anderson, if possible, pt is schedule for tomorrow at 10:00 am, appt added to wait list, thanks.

## 2017-12-15 ENCOUNTER — HOSPITAL ENCOUNTER (OUTPATIENT)
Dept: RADIOLOGY | Facility: CLINIC | Age: 77
Discharge: HOME | End: 2017-12-15
Attending: FAMILY MEDICINE
Payer: MEDICARE

## 2017-12-15 ENCOUNTER — OFFICE VISIT (OUTPATIENT)
Dept: FAMILY MEDICINE | Facility: CLINIC | Age: 77
End: 2017-12-15
Payer: MEDICARE

## 2017-12-15 VITALS
DIASTOLIC BLOOD PRESSURE: 76 MMHG | SYSTOLIC BLOOD PRESSURE: 138 MMHG | BODY MASS INDEX: 33.13 KG/M2 | TEMPERATURE: 100 F | HEIGHT: 73 IN | WEIGHT: 250 LBS

## 2017-12-15 DIAGNOSIS — J01.10 ACUTE FRONTAL SINUSITIS, RECURRENCE NOT SPECIFIED: Primary | ICD-10-CM

## 2017-12-15 DIAGNOSIS — J20.9 ACUTE BRONCHITIS, UNSPECIFIED ORGANISM: ICD-10-CM

## 2017-12-15 LAB
CTP QC/QA: YES
FLUAV AG NPH QL: NEGATIVE
FLUBV AG NPH QL: NEGATIVE

## 2017-12-15 PROCEDURE — 99213 OFFICE O/P EST LOW 20 MIN: CPT | Mod: PBBFAC,25,PO | Performed by: FAMILY MEDICINE

## 2017-12-15 PROCEDURE — 99214 OFFICE O/P EST MOD 30 MIN: CPT | Mod: S$PBB,,, | Performed by: FAMILY MEDICINE

## 2017-12-15 PROCEDURE — 71020 XR CHEST PA AND LATERAL: CPT | Mod: TC,PO

## 2017-12-15 PROCEDURE — 71020 XR CHEST PA AND LATERAL: CPT | Mod: 26,,, | Performed by: RADIOLOGY

## 2017-12-15 PROCEDURE — 99999 PR PBB SHADOW E&M-EST. PATIENT-LVL III: CPT | Mod: PBBFAC,,, | Performed by: FAMILY MEDICINE

## 2017-12-15 PROCEDURE — 87804 INFLUENZA ASSAY W/OPTIC: CPT | Mod: PBBFAC,PO | Performed by: FAMILY MEDICINE

## 2017-12-15 PROCEDURE — 96372 THER/PROPH/DIAG INJ SC/IM: CPT | Mod: PBBFAC,PO

## 2017-12-15 RX ORDER — PROMETHAZINE HYDROCHLORIDE AND CODEINE PHOSPHATE 6.25; 1 MG/5ML; MG/5ML
5 SOLUTION ORAL EVERY 4 HOURS PRN
Qty: 180 ML | Refills: 0 | Status: SHIPPED | OUTPATIENT
Start: 2017-12-15 | End: 2017-12-25

## 2017-12-15 RX ORDER — BETAMETHASONE SODIUM PHOSPHATE AND BETAMETHASONE ACETATE 3; 3 MG/ML; MG/ML
6 INJECTION, SUSPENSION INTRA-ARTICULAR; INTRALESIONAL; INTRAMUSCULAR; SOFT TISSUE ONCE
Status: COMPLETED | OUTPATIENT
Start: 2017-12-15 | End: 2017-12-15

## 2017-12-15 RX ORDER — ALBUTEROL SULFATE 90 UG/1
2 AEROSOL, METERED RESPIRATORY (INHALATION) EVERY 6 HOURS PRN
Qty: 1 EACH | Refills: 3 | Status: SHIPPED | OUTPATIENT
Start: 2017-12-15 | End: 2017-12-29

## 2017-12-15 RX ADMIN — BETAMETHASONE ACETATE AND BETAMETHASONE SODIUM PHOSPHATE 6 MG: 3; 3 INJECTION, SUSPENSION INTRA-ARTICULAR; INTRALESIONAL; INTRAMUSCULAR; SOFT TISSUE at 10:12

## 2017-12-15 NOTE — PROGRESS NOTES
Subjective:       Patient ID: Micheal Hunt is a 77 y.o. male.    Chief Complaint: Generalized Body Aches (cough, light headed)    HPI     Chills  Pt reports that he has had chills for approx 1 week.   He has had issues with anemia in the past and states that he is concerned that this may be the reason for his symptoms.   Pt does reports cough and congestion for a couple weeks.   He states that he has taken OTC cough medications which has been minimally helpful.  No known sick contacts with similar symptoms.   There is associated fatigue.   He has sinus pressure and HA to his nasal region.     Review of Systems   Constitutional: Positive for chills. Negative for fever.   HENT: Positive for congestion and sinus pain.    Respiratory: Positive for cough. Negative for chest tightness and shortness of breath.    Cardiovascular: Negative for chest pain and leg swelling.   Gastrointestinal: Negative for abdominal pain, constipation, diarrhea and vomiting.   Genitourinary: Negative for decreased urine volume, dysuria and hematuria.   Musculoskeletal: Negative for arthralgias.   Neurological: Positive for headaches. Negative for weakness and light-headedness.       Objective:      Physical Exam   Constitutional: He appears well-developed and well-nourished. No distress.   HENT:   Head: Normocephalic and atraumatic.   Mouth/Throat: No oropharyngeal exudate.   Erythema to bilateral nasal turbinates.    Eyes: EOM are normal. Pupils are equal, round, and reactive to light.   Neck: Normal range of motion. Neck supple. No thyromegaly present.   Cardiovascular: Normal rate, regular rhythm, normal heart sounds and intact distal pulses.    Pulmonary/Chest: Effort normal and breath sounds normal. No respiratory distress. He has no wheezes.   Abdominal: Soft. Bowel sounds are normal. He exhibits no distension and no mass. There is no tenderness.   Musculoskeletal: He exhibits no edema.   Lymphadenopathy:     He has no cervical  adenopathy.   Neurological: He is alert.   Skin: Skin is warm. No rash noted. No erythema.   Psychiatric: He has a normal mood and affect. His behavior is normal.   Vitals reviewed.      Assessment:       1. Acute frontal sinusitis, recurrence not specified        Plan:       1. Acute frontal sinusitis, recurrence not specified  - betamethasone acetate-betamethasone sodium phosphate injection 6 mg; Inject 1 mL (6 mg total) into the muscle once.  - CBC auto differential; Future    2. Acute bronchitis, unspecified organism  - POCT Influenza A/B neg  - betamethasone acetate-betamethasone sodium phosphate injection 6 mg; Inject 1 mL (6 mg total) into the muscle once.  - X-Ray Chest PA And Lateral; Future  - CBC auto differential; Future  - promethazine-codeine 6.25-10 mg/5 ml (PHENERGAN WITH CODEINE) 6.25-10 mg/5 mL syrup; Take 5 mLs by mouth every 4 (four) hours as needed for Cough.  Dispense: 180 mL; Refill: 0  - albuterol 90 mcg/actuation inhaler; Inhale 2 puffs into the lungs every 6 (six) hours as needed for Wheezing. Rescue  Dispense: 1 each; Refill: 3      Portions of this note were created using Dragon voice recognition software. There may be voice recognition errors found in the text, and attempts were made to correct these errors prior to signature    Christian Anderson MD    Family Medicine  12/15/2017

## 2017-12-17 NOTE — PROGRESS NOTES
"Subjective:       Patient ID: Micheal Hunt is a 76 y.o. male.    Chief Complaint: Cough; Sore Throat; and Sinus Problem    HPI       CHIEF COMPLAINT: Cough(+).  HPI:     ONSET/TIMING: .  3d   ago    DURATION:               Paroxysmal: no .    QUALITY/COURSE:. unchanged    INTENSITY/SEVERITY: Severity is #  5 (10 point scale)      The following symptoms/statements are positive if BOLD, negative otherwise.      CONTEXT/WHEN:  Tobacco_use. Smokers_in_home. Seasonal_pattern. Allergies/Hayfever. Sinusitis. Irritant_Exposure(smoke/dust/fumes). Exposure_to_others_with_similar_symptoms.        Similar_problems_in_past.   PAST TREATMENT OR EVALUATION:   previous PPD. Recent_previous_chest_x-ray. Recent_antibiotics.  Associated Symptoms:     sputum production: scant. copious. Hemoptysis.  Medical History: Past_pulmonary_infections.  Cardiovascular_disease.chronic_lung_disease.  tuberculosis. Asthma. AIDS. Gastroesophageal_reflux_disease .      Review of Systems   Constitutional: Negative for chills, diaphoresis, fever and unexpected weight change.   HENT: Positive for rhinorrhea, sinus pressure and sore throat.    Respiratory: Positive for cough. Negative for shortness of breath and wheezing.    Cardiovascular: Negative for chest pain.   Musculoskeletal: Negative for myalgias.       Objective:      Vitals:    03/21/17 1152   BP: 115/69   Pulse: 65   Resp: 16   Temp: 98 °F (36.7 °C)   TempSrc: Oral   SpO2: 97%   Weight: 114.1 kg (251 lb 8.7 oz)   Height: 6' 1" (1.854 m)   PainSc: 0-No pain     Physical Exam   Constitutional: He appears well-developed and well-nourished.   Eyes: Pupils are equal, round, and reactive to light.   Cardiovascular: Normal rate, regular rhythm and normal heart sounds.    Pulmonary/Chest: Effort normal and breath sounds normal.   Abdominal: Soft. There is no tenderness.   Neurological: He is alert.   Psychiatric: He has a normal mood and affect. His behavior is normal. Thought content normal. "   Nursing note and vitals reviewed.        Assessment:       1. Acute bronchitis, unspecified organism    2. Shortness of breath          Plan:     Acute bronchitis, unspecified organism  -     predniSONE (DELTASONE) 20 MG tablet; Take 2 tablets (40 mg total) by mouth once daily.  Dispense: 10 tablet; Refill: 0  -     albuterol 90 mcg/actuation inhaler; Inhale 2 puffs into the lungs every 6 (six) hours as needed for Wheezing.  Dispense: 1 each; Refill: 11  -     inhalation device (AEROCHAMBER PLUS FLOW-VU); Use as directed for inhalation.  Dispense: 1 Device; Refill: 0    Shortness of breath  -     albuterol 90 mcg/actuation inhaler; Inhale 2 puffs into the lungs every 6 (six) hours as needed for Wheezing.  Dispense: 1 each; Refill: 11  -     inhalation device (AEROCHAMBER PLUS FLOW-VU); Use as directed for inhalation.  Dispense: 1 Device; Refill: 0      Return for if you are not better return in 2 weeks.       family/patient

## 2017-12-19 ENCOUNTER — OFFICE VISIT (OUTPATIENT)
Dept: FAMILY MEDICINE | Facility: CLINIC | Age: 77
End: 2017-12-19
Payer: MEDICARE

## 2017-12-19 ENCOUNTER — HOSPITAL ENCOUNTER (OUTPATIENT)
Dept: RADIOLOGY | Facility: CLINIC | Age: 77
Discharge: HOME | End: 2017-12-19
Attending: PHYSICIAN ASSISTANT
Payer: MEDICARE

## 2017-12-19 ENCOUNTER — TELEPHONE (OUTPATIENT)
Dept: FAMILY MEDICINE | Facility: CLINIC | Age: 77
End: 2017-12-19

## 2017-12-19 VITALS
SYSTOLIC BLOOD PRESSURE: 146 MMHG | HEIGHT: 73 IN | RESPIRATION RATE: 22 BRPM | BODY MASS INDEX: 32.93 KG/M2 | HEART RATE: 73 BPM | DIASTOLIC BLOOD PRESSURE: 88 MMHG | WEIGHT: 248.44 LBS | OXYGEN SATURATION: 95 % | TEMPERATURE: 99 F

## 2017-12-19 DIAGNOSIS — E66.9 OBESITY, CLASS I, BMI 30-34.9: Chronic | ICD-10-CM

## 2017-12-19 DIAGNOSIS — R05.9 COUGH: ICD-10-CM

## 2017-12-19 DIAGNOSIS — R06.02 SHORTNESS OF BREATH: ICD-10-CM

## 2017-12-19 DIAGNOSIS — J32.9 SINUSITIS, UNSPECIFIED CHRONICITY, UNSPECIFIED LOCATION: ICD-10-CM

## 2017-12-19 DIAGNOSIS — J20.9 ACUTE BRONCHITIS, UNSPECIFIED ORGANISM: Primary | ICD-10-CM

## 2017-12-19 DIAGNOSIS — I10 ESSENTIAL HYPERTENSION: ICD-10-CM

## 2017-12-19 PROCEDURE — 71020 XR CHEST PA AND LATERAL: CPT | Mod: TC,PO

## 2017-12-19 PROCEDURE — 99215 OFFICE O/P EST HI 40 MIN: CPT | Mod: PBBFAC,25,PO | Performed by: PHYSICIAN ASSISTANT

## 2017-12-19 PROCEDURE — 99999 PR PBB SHADOW E&M-EST. PATIENT-LVL V: CPT | Mod: PBBFAC,,, | Performed by: PHYSICIAN ASSISTANT

## 2017-12-19 PROCEDURE — 99214 OFFICE O/P EST MOD 30 MIN: CPT | Mod: S$PBB,,, | Performed by: PHYSICIAN ASSISTANT

## 2017-12-19 PROCEDURE — 71020 XR CHEST PA AND LATERAL: CPT | Mod: 26,,, | Performed by: RADIOLOGY

## 2017-12-19 RX ORDER — MECLIZINE HYDROCHLORIDE 25 MG/1
25 TABLET ORAL 3 TIMES DAILY PRN
Qty: 270 TABLET | Refills: 2 | Status: SHIPPED | OUTPATIENT
Start: 2017-12-19 | End: 2018-02-21

## 2017-12-19 RX ORDER — DOXYCYCLINE 100 MG/1
100 CAPSULE ORAL EVERY 12 HOURS
Qty: 20 CAPSULE | Refills: 0 | Status: SHIPPED | OUTPATIENT
Start: 2017-12-19 | End: 2017-12-29 | Stop reason: ALTCHOICE

## 2017-12-19 RX ORDER — METHYLPREDNISOLONE 4 MG/1
TABLET ORAL
Qty: 1 PACKAGE | Refills: 0 | Status: SHIPPED | OUTPATIENT
Start: 2017-12-19 | End: 2017-12-29 | Stop reason: ALTCHOICE

## 2017-12-19 NOTE — PROGRESS NOTES
Subjective:       Patient ID: Micheal Hunt is a 77 y.o. male.    Chief Complaint: Chest Congestion    Mr. Hunt is a 77 year old male who presents to clinic for urgent care evaluation of cough. He was seen in clinic last Friday 12/15/17 by Dr. Anderson and was diagnosed with viral sinusitis/bronchitis and treated with IM steroids and promethazine cough syrup. He states symptoms improved on Saturday, but then worsened by Sunday. He complains of productive cough, worsening chest heaviness, shortness of breath at rest, and difficulty lying flat at night due to SOB. He denies fevers, but admits to chills. He also complains of sore throat, thick rhinorrhea, post-nasal drip, and head pressure. BP is elevated today in clinic. He denies lower extremity swelling and weight is down 2 lbs since last week.       Review of Systems   Constitutional: Positive for chills. Negative for fever.   HENT: Positive for congestion, ear pain (right), postnasal drip, rhinorrhea and sore throat. Negative for ear discharge, hearing loss, sinus pressure and trouble swallowing.    Eyes: Negative.    Respiratory: Positive for cough, chest tightness, shortness of breath and wheezing. Negative for apnea.    Cardiovascular: Negative for chest pain, palpitations and leg swelling.   Gastrointestinal: Negative for abdominal pain, constipation, diarrhea, nausea and vomiting.   Musculoskeletal: Negative for myalgias.   Allergic/Immunologic: Negative for environmental allergies and immunocompromised state.   Neurological: Negative for dizziness, syncope and light-headedness.   Hematological: Negative for adenopathy.       Objective:      Vitals:    12/19/17 1321   BP: (!) 146/88   Pulse:    Resp:    Temp:      Physical Exam   Constitutional: Vital signs are normal. He appears well-developed.   Obese body habitus.   HENT:   Head: Normocephalic and atraumatic.   Right Ear: Hearing, external ear and ear canal normal. Tympanic membrane is bulging.    Left Ear: Hearing, tympanic membrane, external ear and ear canal normal.   Nose: Nose normal. Right sinus exhibits no maxillary sinus tenderness and no frontal sinus tenderness. Left sinus exhibits no maxillary sinus tenderness and no frontal sinus tenderness.   Mouth/Throat: Oropharynx is clear and moist and mucous membranes are normal.   Eyes: Conjunctivae, EOM and lids are normal. Pupils are equal, round, and reactive to light.   Cardiovascular: Normal rate, regular rhythm, normal heart sounds and intact distal pulses.    Pulmonary/Chest: Effort normal. He has wheezes in the right upper field, the right middle field, the right lower field, the left upper field, the left middle field and the left lower field.   Lymphadenopathy:     He has no cervical adenopathy.   Skin: Skin is warm and dry.   Psychiatric: He has a normal mood and affect.       Assessment:       1. Acute bronchitis, unspecified organism    2. Shortness of breath    3. Sinusitis, unspecified chronicity, unspecified location    4. Essential hypertension    5. Obesity, Class I, BMI 32.8 to 34.3, 32.7, down to 31.5        Plan:       Acute bronchitis, unspecified organism  -     X-Ray Chest PA And Lateral; Future; Expected date: 12/19/2017  -     methylPREDNISolone (MEDROL DOSEPACK) 4 mg tablet; use as directed  Dispense: 1 Package; Refill: 0    Shortness of breath  -     X-Ray Chest PA And Lateral; Future; Expected date: 12/19/2017. Negative for infiltrate.     Sinusitis, unspecified chronicity, unspecified location  -     methylPREDNISolone (MEDROL DOSEPACK) 4 mg tablet; use as directed  Dispense: 1 Package; Refill: 0  -     doxycycline (VIBRAMYCIN) 100 MG Cap; Take 1 capsule (100 mg total) by mouth every 12 (twelve) hours.  Dispense: 20 capsule; Refill: 0  - Take antibiotics with food.  Increase fluid intake.  Call the clinic if symptoms worsen, new symptoms develop or if you are not any better after completion of your antibiotics.      Essential  hypertension        - Elevated due to acute illness, readdress f/u at visit next week    Obesity, Class I, BMI 32.8 to 34.3, 32.7, down to 31.5         - See below      Patient readiness: acceptance and barriers:none    During the course of the visit the patient was educated and counseled about the following:     Hypertension:   Check blood pressures 3 times weekly and record.  Obesity:   not discussed at urgent care visit.     Goals: Hypertension: Reduce Blood Pressure and Obesity: Reduce calorie intake and BMI    Did patient meet goals/outcomes: No    The following self management tools provided: blood pressure log    Patient Instructions (the written plan) was given to the patient/family.     Time spent with patient: 15 minutes

## 2017-12-19 NOTE — TELEPHONE ENCOUNTER
----- Message from Shakira Bangura sent at 12/18/2017  5:28 PM CST -----  Contact: self  Pt is calling in regards to not feeling any better. Wants to know if there is something else that can be prescribed or does he need to be seen again.    Pt can be reached at [phone].    Thank you

## 2017-12-29 ENCOUNTER — HOSPITAL ENCOUNTER (OUTPATIENT)
Dept: RADIOLOGY | Facility: CLINIC | Age: 77
Discharge: HOME | End: 2017-12-29
Attending: PHYSICIAN ASSISTANT
Payer: MEDICARE

## 2017-12-29 ENCOUNTER — OFFICE VISIT (OUTPATIENT)
Dept: FAMILY MEDICINE | Facility: CLINIC | Age: 77
End: 2017-12-29
Payer: MEDICARE

## 2017-12-29 VITALS
BODY MASS INDEX: 32.7 KG/M2 | HEIGHT: 73 IN | DIASTOLIC BLOOD PRESSURE: 82 MMHG | SYSTOLIC BLOOD PRESSURE: 134 MMHG | TEMPERATURE: 98 F | OXYGEN SATURATION: 98 % | HEART RATE: 58 BPM | WEIGHT: 246.69 LBS

## 2017-12-29 DIAGNOSIS — R07.89 BURNING IN THE CHEST: Primary | ICD-10-CM

## 2017-12-29 DIAGNOSIS — R07.89 BURNING IN THE CHEST: ICD-10-CM

## 2017-12-29 DIAGNOSIS — R06.2 WHEEZING: ICD-10-CM

## 2017-12-29 DIAGNOSIS — I10 ESSENTIAL HYPERTENSION: ICD-10-CM

## 2017-12-29 DIAGNOSIS — E66.9 OBESITY, CLASS I, BMI 30-34.9: Chronic | ICD-10-CM

## 2017-12-29 DIAGNOSIS — J20.9 ACUTE BRONCHITIS, UNSPECIFIED ORGANISM: ICD-10-CM

## 2017-12-29 DIAGNOSIS — I25.10 CORONARY ARTERY DISEASE INVOLVING NATIVE CORONARY ARTERY OF NATIVE HEART WITHOUT ANGINA PECTORIS: ICD-10-CM

## 2017-12-29 DIAGNOSIS — I51.9 LV DYSFUNCTION: ICD-10-CM

## 2017-12-29 PROCEDURE — 99215 OFFICE O/P EST HI 40 MIN: CPT | Mod: PBBFAC,25,PO | Performed by: PHYSICIAN ASSISTANT

## 2017-12-29 PROCEDURE — 71020 XR CHEST PA AND LATERAL: CPT | Mod: 26,,, | Performed by: RADIOLOGY

## 2017-12-29 PROCEDURE — 93010 ELECTROCARDIOGRAM REPORT: CPT | Mod: ,,, | Performed by: INTERNAL MEDICINE

## 2017-12-29 PROCEDURE — 99214 OFFICE O/P EST MOD 30 MIN: CPT | Mod: S$PBB,,, | Performed by: PHYSICIAN ASSISTANT

## 2017-12-29 PROCEDURE — 99999 PR PBB SHADOW E&M-EST. PATIENT-LVL V: CPT | Mod: PBBFAC,,, | Performed by: PHYSICIAN ASSISTANT

## 2017-12-29 PROCEDURE — 71020 XR CHEST PA AND LATERAL: CPT | Mod: TC,PO

## 2017-12-29 PROCEDURE — 93005 ELECTROCARDIOGRAM TRACING: CPT | Mod: PBBFAC,PO | Performed by: PHYSICIAN ASSISTANT

## 2017-12-29 RX ORDER — IPRATROPIUM BROMIDE AND ALBUTEROL SULFATE 2.5; .5 MG/3ML; MG/3ML
3 SOLUTION RESPIRATORY (INHALATION) EVERY 6 HOURS PRN
COMMUNITY
End: 2017-12-29 | Stop reason: SDUPTHER

## 2017-12-29 RX ORDER — IPRATROPIUM BROMIDE AND ALBUTEROL SULFATE 2.5; .5 MG/3ML; MG/3ML
3 SOLUTION RESPIRATORY (INHALATION) EVERY 6 HOURS PRN
Qty: 3 BOX | Refills: 0 | Status: SHIPPED | OUTPATIENT
Start: 2017-12-29 | End: 2018-01-09 | Stop reason: SDUPTHER

## 2017-12-29 NOTE — PROGRESS NOTES
Subjective:       Patient ID: Micheal Hunt is a 77 y.o. male.    Chief Complaint: follow up bronchitis    Mr. Hunt is a 77 year old male who presents to clinic for 1 week follow up on acute bronchitis and sinusitis. He has completed full course of doxycycline and medrol dose pack. He states cough and chest congestion have slightly improved since last visit, but he continues to have shortness of breath with lying flat a night. He also complains of burning pain that occurs in the center/right side of his chest with any exertion for the last 1-2 weeks. He denies chest pain at rest. He states sinus symptoms have completely resolved. He has lost 2 lbs since his last office visit and has no lower extremity edema. Of note, he has hx of CAD s/p SV CABG 2007 and he had abnormal stress test in 3/2016. He has nitroglycerin, but has not been using this. He is scheduled for Cardiology f/u with Dr. Dunlap later this month.       Review of Systems   Constitutional: Negative for chills, fatigue and fever.   HENT: Negative for congestion, ear discharge, ear pain, hearing loss, postnasal drip, rhinorrhea, sinus pressure, sore throat and trouble swallowing.    Eyes: Negative.    Respiratory: Positive for cough, shortness of breath and wheezing. Negative for apnea and chest tightness.    Cardiovascular: Negative for chest pain, palpitations and leg swelling.   Gastrointestinal: Negative for abdominal pain, constipation, diarrhea, nausea and vomiting.   Musculoskeletal: Negative for myalgias.   Allergic/Immunologic: Negative for environmental allergies and immunocompromised state.   Neurological: Negative for dizziness, syncope and light-headedness.   Hematological: Negative for adenopathy.       Objective:      Vitals:    12/29/17 0932   BP: 134/82   Pulse:    Temp:      Physical Exam   Constitutional: Vital signs are normal. He appears well-developed and well-nourished.   HENT:   Head: Normocephalic and atraumatic.   Right  Ear: Hearing, tympanic membrane, external ear and ear canal normal.   Left Ear: Hearing, tympanic membrane, external ear and ear canal normal.   Nose: Nose normal. Right sinus exhibits no maxillary sinus tenderness and no frontal sinus tenderness. Left sinus exhibits no maxillary sinus tenderness and no frontal sinus tenderness.   Mouth/Throat: Oropharynx is clear and moist and mucous membranes are normal.   Eyes: Conjunctivae, EOM and lids are normal. Pupils are equal, round, and reactive to light.   Cardiovascular: Normal rate, regular rhythm, normal heart sounds and intact distal pulses.    Pulmonary/Chest: Effort normal and breath sounds normal. Wheezes: slight expiratory wheeze in the RLF.   Lymphadenopathy:     He has no cervical adenopathy.   Skin: Skin is warm and dry.   Psychiatric: He has a normal mood and affect.       Assessment:       1. Burning in the chest    2. Wheezing    3. Acute bronchitis, unspecified organism    4. Coronary artery disease involving native coronary artery of native heart without angina pectoris    5. Essential hypertension    6. 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 Obesity, Class I, BMI 32.8 to 34.3, 32.7, down to 31.5        Plan:       Burning in the chest  -     X-Ray Chest PA And Lateral; Future; Expected date: 12/29/2017  -     Brain natriuretic peptide; Future; Expected date: 12/29/2017  -     IN OFFICE EKG 12-LEAD (to Muse). Sinus negrita with 1st degree AV block. LAD, LVH, t wave abnormality, abnormal EKG. EKG reviewed with Dr. Ramírez.     Wheezing      Likely related to bronchitis; however, will rule out CHF exacerbation  -     X-Ray Chest PA And Lateral; Future; Expected date: 12/29/2017  -     Brain natriuretic peptide; Future; Expected date: 12/29/2017    Acute bronchitis, unspecified organism  -     albuterol-ipratropium 2.5mg-0.5mg/3mL (DUO-NEB) 0.5 mg-3 mg(2.5 mg base)/3 mL nebulizer solution; Take 3 mLs by nebulization every 6 (six) hours as needed for  Wheezing. Rescue  Dispense: 3 Box; Refill: 0    Coronary artery disease involving native coronary artery of native heart without angina pectoris        - Hx of CAD, abnormal stress test 3/16 with abnormal EKG. Will send a message to Dr. Dunlap to see if f/u appt should be moved up to a sooner date. Advised pt to use nitro PRN if he develops chest pain and discussed chest pain precautions and when to go to ER.     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  Obesity:   Informal exercise measures discussed, e.g. taking stairs instead of elevator.    Goals: Hypertension: Reduce Blood Pressure and Obesity: Reduce calorie intake and BMI    Did patient meet goals/outcomes: No    The following self management tools provided: declined    Patient Instructions (the written plan) was given to the patient/family.     Time spent with patient: 30 minutes

## 2017-12-29 NOTE — Clinical Note
Dr. Dunlap,  I saw Mr. Hunt for f/u on acute bronchitis today. He was also complaining of atypical chest pain with exertion. Chest pain may be related to recent pulmonary flare; however, his EKG appeared to be abnormal with changes compared to prior. He is scheduled to f/u with you at the end of January. Thank you for your input.  Sincerely,  Rere Braswell PA-C

## 2018-01-03 ENCOUNTER — TELEPHONE (OUTPATIENT)
Dept: FAMILY MEDICINE | Facility: CLINIC | Age: 78
End: 2018-01-03

## 2018-01-03 ENCOUNTER — PATIENT MESSAGE (OUTPATIENT)
Dept: FAMILY MEDICINE | Facility: CLINIC | Age: 78
End: 2018-01-03

## 2018-01-03 NOTE — TELEPHONE ENCOUNTER
----- Message from Georgina Squires sent at 1/2/2018  9:26 AM CST -----  Contact: patient  Patient calling to schedule a same day appt today. The patient has bronchitis/ cough. No avail appt. Please advise.  Call back   Thanks!

## 2018-01-04 ENCOUNTER — TELEPHONE (OUTPATIENT)
Dept: CARDIOLOGY | Facility: CLINIC | Age: 78
End: 2018-01-04

## 2018-01-04 NOTE — TELEPHONE ENCOUNTER
Returned pts phone call. Pt stated Dr. Sarmiento PA told him to get in with Dr. Dunlap because something was wrong with his heart according to his EKG. Pt scheduled to see Dr. Dunlap on 1/29/2018. Told pt I would speak with Dr. Dunlap and see if he needed to see him sooner.

## 2018-01-04 NOTE — TELEPHONE ENCOUNTER
----- Message from Sintia Landeros sent at 1/3/2018  5:05 PM CST -----  Contact: Pt can be reachea t [phone]  Pt is calling to speak with the nurse or doctor pt has problems with bronchitis and schedule an appt also wants to discuss test results.      Thank you!

## 2018-01-08 ENCOUNTER — TELEPHONE (OUTPATIENT)
Dept: FAMILY MEDICINE | Facility: CLINIC | Age: 78
End: 2018-01-08

## 2018-01-08 NOTE — TELEPHONE ENCOUNTER
----- Message from Crytsal Falk sent at 1/2/2018  9:35 AM CST -----  Contact: patient  Patient, Micheal Hunt, [phone] is calling - Patient would like to speak to Rere Braswell regarding his condition that has not gotten any better - Patient was diagnosed with bronchitis and is still having the same symptoms.  Please advise.  Thanks!

## 2018-01-08 NOTE — TELEPHONE ENCOUNTER
Spoke with patient who states he has been having URI symptoms for almost 2 months. Nothing seems to be working for him. He still has a cough and congestion. Appointment scheduled for him with NANCY Roy tomorrow at noon.

## 2018-01-09 ENCOUNTER — DOCUMENTATION ONLY (OUTPATIENT)
Dept: FAMILY MEDICINE | Facility: CLINIC | Age: 78
End: 2018-01-09

## 2018-01-09 ENCOUNTER — OFFICE VISIT (OUTPATIENT)
Dept: FAMILY MEDICINE | Facility: CLINIC | Age: 78
End: 2018-01-09
Payer: MEDICARE

## 2018-01-09 ENCOUNTER — TELEPHONE (OUTPATIENT)
Dept: PULMONOLOGY | Facility: CLINIC | Age: 78
End: 2018-01-09

## 2018-01-09 VITALS
BODY MASS INDEX: 31.79 KG/M2 | TEMPERATURE: 98 F | HEART RATE: 62 BPM | SYSTOLIC BLOOD PRESSURE: 144 MMHG | DIASTOLIC BLOOD PRESSURE: 83 MMHG | WEIGHT: 239.88 LBS | HEIGHT: 73 IN

## 2018-01-09 DIAGNOSIS — J42 CHRONIC BRONCHITIS, UNSPECIFIED CHRONIC BRONCHITIS TYPE: Primary | ICD-10-CM

## 2018-01-09 DIAGNOSIS — J40 BRONCHITIS: ICD-10-CM

## 2018-01-09 DIAGNOSIS — J20.9 ACUTE BRONCHITIS, UNSPECIFIED ORGANISM: ICD-10-CM

## 2018-01-09 PROCEDURE — 99999 PR PBB SHADOW E&M-EST. PATIENT-LVL V: CPT | Mod: PBBFAC,,, | Performed by: PHYSICIAN ASSISTANT

## 2018-01-09 PROCEDURE — 99215 OFFICE O/P EST HI 40 MIN: CPT | Mod: PBBFAC,PO | Performed by: PHYSICIAN ASSISTANT

## 2018-01-09 PROCEDURE — 99213 OFFICE O/P EST LOW 20 MIN: CPT | Mod: S$PBB,,, | Performed by: PHYSICIAN ASSISTANT

## 2018-01-09 RX ORDER — IPRATROPIUM BROMIDE AND ALBUTEROL SULFATE 2.5; .5 MG/3ML; MG/3ML
3 SOLUTION RESPIRATORY (INHALATION) EVERY 6 HOURS PRN
Qty: 3 BOX | Refills: 0 | Status: SHIPPED | OUTPATIENT
Start: 2018-01-09 | End: 2018-01-28 | Stop reason: SDUPTHER

## 2018-01-09 NOTE — PROGRESS NOTES
Pre-Visit Chart Review  For Appointment Scheduled on 01/09/2018      Health Maintenance Due   Topic Date Due    TETANUS VACCINE  09/27/1958    Zoster Vaccine  09/27/1999    Influenza Vaccine  08/01/2017

## 2018-01-09 NOTE — TELEPHONE ENCOUNTER
Scheduled appointment with patient  ----- Message from Brooke Stroud MA sent at 1/9/2018 12:18 PM CST -----  Patient needs an appointment for chronic bronchitis.  Please call patient to schedule

## 2018-01-09 NOTE — PROGRESS NOTES
Subjective:       Patient ID: Micheal Hunt is a 77 y.o. male.    Chief Complaint: Cough (congestion)    Cough   This is a chronic problem. Episode onset: 2 months ago. The problem has been unchanged. The problem occurs constantly. The cough is productive of sputum. Associated symptoms include shortness of breath and wheezing. Pertinent negatives include no chest pain, chills, ear congestion, ear pain, eye redness, fever, headaches, nasal congestion, postnasal drip, rhinorrhea or sore throat. The symptoms are aggravated by lying down. He has tried OTC cough suppressant, oral steroids and prescription cough suppressant (patient has not recieved his Duoneb from the pharmacy and has not been getting breathing txs) for the symptoms. His past medical history is significant for bronchitis.     Review of Systems   Constitutional: Negative for activity change, appetite change, chills, fatigue and fever.   HENT: Negative for congestion, ear discharge, ear pain, facial swelling, hearing loss, mouth sores, nosebleeds, postnasal drip, rhinorrhea, sinus pain, sinus pressure, sore throat, tinnitus and trouble swallowing.    Eyes: Negative for discharge, redness and visual disturbance.   Respiratory: Positive for cough, shortness of breath and wheezing. Negative for chest tightness.    Cardiovascular: Negative for chest pain, palpitations and leg swelling.   Gastrointestinal: Negative for abdominal pain, nausea and vomiting.   Musculoskeletal: Negative for neck stiffness.   Neurological: Negative for headaches.       Objective:      Physical Exam   Constitutional: He appears well-developed and well-nourished. No distress.   HENT:   Head: Normocephalic and atraumatic.   Right Ear: External ear normal.   Left Ear: External ear normal.   Mouth/Throat: Uvula is midline and mucous membranes are normal. No uvula swelling. No oropharyngeal exudate, posterior oropharyngeal edema, posterior oropharyngeal erythema or tonsillar  abscesses.   Eyes: Conjunctivae and EOM are normal. Pupils are equal, round, and reactive to light. Right eye exhibits no discharge. Left eye exhibits no discharge.   Neck: Normal range of motion. Neck supple. No thyromegaly present.   Cardiovascular: Normal rate, regular rhythm and normal heart sounds.  Exam reveals no gallop and no friction rub.    No murmur heard.  Pulmonary/Chest: Effort normal and breath sounds normal. No respiratory distress. He has no wheezes. He has no rales.   Abdominal: Soft. Bowel sounds are normal. There is no tenderness.   Lymphadenopathy:     He has no cervical adenopathy.   Skin: He is not diaphoretic.       Assessment:       1. Chronic bronchitis, unspecified chronic bronchitis type    2. Bronchitis     3. Acute bronchitis, unspecified organism        Plan:       Micheal was seen today for cough.    Diagnoses and all orders for this visit:    Chronic bronchitis, unspecified chronic bronchitis type  -     Ambulatory referral to Pulmonology  -     Complete PFT with bronchodilator; Future  -     PULSE OXIMETRY WITH REST - PULM; Future  -     albuterol-ipratropium 2.5mg-0.5mg/3mL (DUO-NEB) 0.5 mg-3 mg(2.5 mg base)/3 mL nebulizer solution; Take 3 mLs by nebulization every 6 (six) hours as needed for Wheezing. Rescue    Bronchitis   -     Complete PFT with bronchodilator; Future  -     PULSE OXIMETRY WITH REST - PULM; Future  -     albuterol-ipratropium 2.5mg-0.5mg/3mL (DUO-NEB) 0.5 mg-3 mg(2.5 mg base)/3 mL nebulizer solution; Take 3 mLs by nebulization every 6 (six) hours as needed for Wheezing. Rescue    Acute bronchitis, unspecified organism  -     albuterol-ipratropium 2.5mg-0.5mg/3mL (DUO-NEB) 0.5 mg-3 mg(2.5 mg base)/3 mL nebulizer solution; Take 3 mLs by nebulization every 6 (six) hours as needed for Wheezing. Rescue     I called the pharmacy and spoke to pharmacist about the Duoneb, I resubmitted the prescription and they verified receipt.

## 2018-01-10 ENCOUNTER — HOSPITAL ENCOUNTER (OUTPATIENT)
Dept: RESPIRATORY THERAPY | Facility: HOSPITAL | Age: 78
Discharge: HOME | End: 2018-01-10
Attending: PHYSICIAN ASSISTANT
Payer: MEDICARE

## 2018-01-10 DIAGNOSIS — J40 BRONCHITIS: ICD-10-CM

## 2018-01-10 DIAGNOSIS — J42 CHRONIC BRONCHITIS, UNSPECIFIED CHRONIC BRONCHITIS TYPE: ICD-10-CM

## 2018-01-10 PROCEDURE — 94729 DIFFUSING CAPACITY: CPT | Mod: 26,,, | Performed by: INTERNAL MEDICINE

## 2018-01-10 PROCEDURE — 94727 GAS DIL/WSHOT DETER LNG VOL: CPT | Mod: 26,,, | Performed by: INTERNAL MEDICINE

## 2018-01-10 PROCEDURE — 94760 N-INVAS EAR/PLS OXIMETRY 1: CPT

## 2018-01-10 PROCEDURE — 94727 GAS DIL/WSHOT DETER LNG VOL: CPT

## 2018-01-10 PROCEDURE — 94060 EVALUATION OF WHEEZING: CPT | Mod: 26,,, | Performed by: INTERNAL MEDICINE

## 2018-01-10 PROCEDURE — 94729 DIFFUSING CAPACITY: CPT

## 2018-01-10 PROCEDURE — 94060 EVALUATION OF WHEEZING: CPT

## 2018-01-12 NOTE — PROCEDURES
Pulmonary Functions, including spirometry and bronchodilator response and lung volumes and diffusion, study was done 1/10/18.  Spirometry shows loss of vital capacity and fev1 with no obstruction and no bronchodilator response.   FEV1 is 71% or 2.29 liters.  Lung volumes show  normal TLC.  Diffusion shows reduced but falls within normal range when corrected for lung volumes.    Pulmonary functions show low vital capacity but otherwise normal. MVV is lower than expected and could be from neuromuscular disease?  Clinical correlation recommended.     Mihir Guillen M.D.

## 2018-01-28 DIAGNOSIS — J20.9 ACUTE BRONCHITIS, UNSPECIFIED ORGANISM: ICD-10-CM

## 2018-01-28 DIAGNOSIS — J40 BRONCHITIS: ICD-10-CM

## 2018-01-28 DIAGNOSIS — J42 CHRONIC BRONCHITIS, UNSPECIFIED CHRONIC BRONCHITIS TYPE: ICD-10-CM

## 2018-01-29 ENCOUNTER — OFFICE VISIT (OUTPATIENT)
Dept: CARDIOLOGY | Facility: CLINIC | Age: 78
End: 2018-01-29
Payer: MEDICARE

## 2018-01-29 VITALS
SYSTOLIC BLOOD PRESSURE: 165 MMHG | DIASTOLIC BLOOD PRESSURE: 81 MMHG | BODY MASS INDEX: 32.84 KG/M2 | HEIGHT: 73 IN | HEART RATE: 64 BPM | OXYGEN SATURATION: 99 % | WEIGHT: 247.81 LBS

## 2018-01-29 DIAGNOSIS — I10 ESSENTIAL HYPERTENSION: ICD-10-CM

## 2018-01-29 DIAGNOSIS — I25.10 CORONARY ARTERY DISEASE INVOLVING NATIVE CORONARY ARTERY OF NATIVE HEART WITHOUT ANGINA PECTORIS: ICD-10-CM

## 2018-01-29 DIAGNOSIS — E66.9 OBESITY, CLASS I, BMI 30-34.9: Chronic | ICD-10-CM

## 2018-01-29 DIAGNOSIS — I11.9 HYPERTENSIVE LEFT VENTRICULAR HYPERTROPHY, WITHOUT HEART FAILURE: ICD-10-CM

## 2018-01-29 DIAGNOSIS — N18.30 CKD (CHRONIC KIDNEY DISEASE), STAGE III: Primary | ICD-10-CM

## 2018-01-29 PROCEDURE — 99214 OFFICE O/P EST MOD 30 MIN: CPT | Mod: S$PBB,,, | Performed by: INTERNAL MEDICINE

## 2018-01-29 PROCEDURE — 99999 PR PBB SHADOW E&M-EST. PATIENT-LVL IV: CPT | Mod: PBBFAC,,, | Performed by: INTERNAL MEDICINE

## 2018-01-29 PROCEDURE — 99214 OFFICE O/P EST MOD 30 MIN: CPT | Mod: PBBFAC,PO | Performed by: INTERNAL MEDICINE

## 2018-01-29 RX ORDER — IPRATROPIUM BROMIDE AND ALBUTEROL SULFATE 2.5; .5 MG/3ML; MG/3ML
SOLUTION RESPIRATORY (INHALATION)
Qty: 270 ML | Refills: 0 | Status: SHIPPED | OUTPATIENT
Start: 2018-01-29 | End: 2018-09-25 | Stop reason: SDUPTHER

## 2018-01-29 NOTE — PROGRESS NOTES
Subjective:    Patient ID:  Micheal Hunt is a 77 y.o. male who presents for  of Follow-up  For post CABG, 3 months review, post biopsies of kidney and stomach, elevated HTN, progressive CKD now stage 4  PCP: Dr. Lakhani  Renal: Dr. Rojo, last seen 1/10/2018  ENT: Dr. Nails  GI: Dr. Harris, Dr. Saleem  Physical medicine: Dr. Whitehead  Urology: Dr. Herring  Orthopedic: Dr. Fox in [place], [place], [phone], Dr. Álvarez  Lives alone, daughter, Tonya, on the same ranch, 20 acre, 4 horses, 20 chicken, 3  workers, prior commercial contractor, grandson, Uziel  Daughter, Crow, nutritionist in [facility] supportive of Mediterranean diet, and a Yoga instruction.      HPI Comments: [place] (Delisa) male, retired cottrell at [employer], here for pre-op evaluation. Significant cardiac history with previous CABG in [place]. Stress test earlier this year was abnormal: Lexiscan -  Nuclear Quantitative Functional Analysis:                                    LVEF: 47 % (normal is 47 - 59)                                               LVED Volume: 195 ml (normal is 91 - 155)                                     LVES Volume: 104 ml (normal is 40 - 78)                                                                                                                   Impression: ABNORMAL MYOCARDIAL PERFUSION                                    1. There is evidence for mild to moderate myocardial ischemia in the         septal wall of the left ventricle, and moderate myocardial ischemia in       the anteroapical wall of the left ventricle with associated stress           induced LV cavity dilatation.                                                2. There is mild intensity fixed defect in the lateral wall of the left      ventricle, consistent with myocardial injury.                                3. There is abnormal wall motion at rest showing severe hypokinesis of       the septal wall of the left ventricle.                                        4. Resting LV function is normal.  (normal is 47 - 59)                       5. The left ventricular volume is moderately increased at rest.              6. The extracardiac distribution of radioactivity is normal.    Subsequent angiogram, 5/2012:  ANGIOGRAPHIC RESULTS:                                                                                                                                  DIAGNOSTIC:                                                                  Patient has a right dominant coronary artery.                                                                                                             - Left Main Coronary Artery:                                                 The LM is occluded. There is JOURDAN 0 flow.                                                                                                                 - Left Anterior Descending Artery:                                           The LAD has luminal irregularities. There is JOURDAN 3 flow. Fed                from the LIMA graft                                                                                                                                       - Left Circumflex Artery:                                                    The LCX was not found.                                                                                                                                    - Right Coronary Artery:                                                     The RCA has luminal irregularities. There is JOURDAN 3 flow.                    appear to be fed from SVG, could not cannulate, poorly visualized.                                                                                        - Aortic Root:                                                               The Aortic Root is normal. There is JOURDAN 3 flow.                             Lesion Details: Moderate proximal tortuosity, mild to                         moderate calcification.                                                                                                                                   - HERRON To LAD:                                                               The LIMA to LAD is normal. There is JOURDAN 3 flow.                                                                                                                                                                                       D. SUMMARY:                                                                                                                                               1. Three vessel coronary artery disease.                                     2. Normal LVEF.                                                              3. Mild aortic insufficiency.                                                4. Very difficult study                                                      5. Multiple attempts to cannulate SVG which appears to go to the RCA.        Native RCA appears to have an ostial occlusion.    Was continued on optimal medical management. Could not tolerate atenolol due to severe weakness. Recently without any cardiac complaints. Limited by left knee with soreness and pains. Went to [facility] in [place], [place] and told of the prior implant was too small and not stable. Anticipating re-op by Dr. Fox, a [employer] graduate, in [place]. [place].    Since visit of 10/2/2012, underwent left TKR with good results, had 16 weeks of rehab without much problem. Here today due to hypertension. Home record showed -185/83-99. Noted more dizziness when the pressures are high. Problem is helped by Meclizine. Have decreased exercise following the operation. Diet said to be no salt. No problem with medications,  need 90 days supply.    Since visit of 4/4, concern about his kidney, see telephone enc on 4/22 with BUN of 28, Scr 1.4, K+ 4.1, and eGFR 50. Old record reviewed,  no significant julian in function since 6/2011. Agree now to try HCTZ 12.5 mg every other day along with increase in lisinopril to 80 mg daily. Home BP reviewed 159-189/85-99. No other new problem. Wants comprehensive blood work the next visit.    Since visit of 12/13, left sided CP is gone, appears to be due to straining while carrying a 34 lbs grandson. Discussed cath report and [facility]. Home /80. Daughter feels he is stressed, personally do not feel so. Would like to be back in [place] but not family are here. Ill younger sister in [place]. Biking 3 times a week for 15 to 30 minutes twice a day. Do little weights every day.  Lexiscan, 12/19/2013  Nuclear Quantitative Functional Analysis:   LVEF: 42 % (normal is 47 - 59)  LVED Volume: 193 ml (normal is 91 - 155)  LVES Volume: 112 ml (normal is 40 - 78)    Impression: ABNORMAL MYOCARDIAL PERFUSION  1. There is evidence for mild myocardial ischemia in the anterior and lateral apical walls of the left ventricle with associated stress induced LV cavity dilatation.   2. The perfusion scan is free of evidence for myocardial injury.   3. There is abnormal wall motion at rest showing moderate global hypokinesis of the left ventricle.   4. There is resting LV dysfunction with a reduced ejection fraction of 42 %.  (normal is 47 - 59)  5. The left ventricular volume is moderately increased at rest.   6. The extracardiac distribution of radioactivity is normal.   7. When compared to the previous study from 04/12/2012, the LVEF have decreased with global hypokinesia.  8. [facility] SSS =2 =SDS, suggest that 2.5% of myocardium may be ischemic.    ECHO, 12/2013, CONCLUSIONS     1 - Biatrial enlargement.     2 - Enlarged left ventricular enlargement.     3 - Eccentric hypertrophy.     4 - Low normal to mildly depressed left ventricular systolic function (EF 50-55%).     5 - Left ventricular diastolic dysfunction.     6 - Mild mitral regurgitation.     7 - Normal  right ventricular systolic function .     8 - Mild to moderate aortic regurgitation.     9 - Some improvement in LV function with reduction in AR from echo on 9/29/2012.  Since visit of 5/2, had to go to [place] for sister, 60 year old with a CVA. Stopped HCTZ for 5 weeks due to traveling. No CP but some return of indigestion, previously helped by Prilosec. Used for about 4 weeks with benefit. Discuss Rx 4-8 weeks treatments are reasonable. Blood work on 6/3 LDL is 113, , with HDL of 33. Cr. Remains stable at 1.4 with K+ 4.1. PSA is elevated to > 4 on finasteride 5 mg for past 10 years, prescribed by Urologist in [place].  CBC is normal.    Since visit of 6/11, did not get the referral to Urology, also had recent fever and chills with lower abdominal pain while in [place] last Thursday to Friday, noted some weakened stream. Also at night can hear strong heart beat and take BP showing , would then take an additional 40 mg of lisinopril on top of 80 mg each AM. Blood work showed first time elevation of Scr to 1.6 without change in BUN and normal K+.     Since visit of 7/11, had problem with spironolactone, took only 2 doses and had severe dizziness for 2 days, also had to adjust timing of medications to avoid dizziness after medications. 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 Sleeping well. Other medications are fine, compliant. Blood work showed slight rise in Scr to 1.5-1.6 from 1.4 after spironolactone. Saw Dr. Herring and given antibiotic. BP at home 140-160/80.    Since visit of 8/15, feeling fine, no problem with medications, home BP similar with systolic of 140-150. 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 No problem note, no limits. Using Tylenol 500 mg BID for OA, helpful. Sleeping well, tried Melatonin and now just using warm milk at HS. Blood work reviewed, normal testosterone, uric acid 6.5, BMP with  "stable Cr of 1.5 with FBS of 103, Lipid panel shows LDL of 78.8 on 80 mg of atorvastatin.     Since visit of 9/17, saw Dr. Calderon for pre-op evaluation and suggested sooner follow up for abnormal stress test. Denies any CP but with occasional chest "congestion" with pleuritic CP with deep breathing, Occurs now and then, sometime related to heavy exertion. Helped with breathing Rx in hospital and albuterol inhaler at home. Last spell about a month ago, lasted for 30 minutes. Had OP left operation on 11/25, no problem. Since home no problem. CXR on 11/6 was normal. Recent labs - LDL 86, HDL 36, normal CMP and CBC.  Serum Cr 1.4, eGFR 49.1, stable. Request nebulizer for home use.    Since visit of 12/10, next morning woke up with high BP, 184/102, felt dizzy, no fall, then a constant heart "hurting", grade 5/10, seems to increase after meal, tried Rolaids and Tums without much help. Pain constant til now. Call answering service at 6 AM and advised to come to office for EKG and evaluation. EKG NSR with frequent APC, rate of 68, LVH with STT abnormalities, no acute change. Discussed atypical presentation of heart pains and also possible GI source of problem. Have not seen any GI specialist for his GERD. Also discussed use of NTG for chest pains. BP at home this AM, 174/100, obviously distressed.    Since visit of 1/9/2014, continue with some back pain over past 6 months, concern possibly due to high dose atorvastatin. Also noted some fatigue with palpitation in the middle of the night, have to get up 3 times for nocturia. No CP but BP elevated in the middle of night to 140/92. Last Holter in 4/2012: PREDOMINANT RHYTHM  1. Sinus rhythm with heart rates varying between 40 and 195 bpm with an average of 77 bpm.     VENTRICULAR ARRHYTHMIAS  1. There were occasional PVCs totalling 290 and averaging 12 per hour.  There were 2 couplets.    2. There were no episodes of ventricular tachycardia.    SUPRA VENTRICULAR " ARRHYTHMIAS  1. There were very frequent PACs totalling 5254 and averaging 228 per hour.  There were 143 couplets.    2. There were no episodes of sustained supraventricular tachycardia.    SINUS NODE FUNCTION  1. There was no evidence of high grade SA nicolás block.     AV CONDUCTION  1. There was no evidence of high grade AV block.     DIARY  1. The diary was not returned    MISCELLANEOUS  1. This was a tape of adequate length (23 hrs).    Also worries about friend in [place] dying without a definite cause. Some worries of kidney and liver due to medications. Last labs in 11/2013 reviewed.     Since visit of 3/17, no new problem, no further CP,   Holter done without symptoms - PREDOMINANT RHYTHM  1. Sinus arrhythmia with heart rates varying between 41 and 164 bpm with an average of 79 bpm.     VENTRICULAR ARRHYTHMIAS  1. There were occasional mostly monomorphic PVCs totalling 272 and averaging 11 per hour.  There was 1 couplet.    2. There were no episodes of ventricular tachycardia.    SUPRA VENTRICULAR ARRHYTHMIAS  1. There were frequent PACs totalling 1748 and averaging 72 per hour.  There were 9 bigeminal cycles.  There were 47 couplets. There were 5 triplets.     2. 1.5% of complexes.    3. There were no episodes of sustained supraventricular tachycardia.    SINUS NODE FUNCTION  1. There was no evidence of high grade SA nicolás block.     AV CONDUCTION  1. There was no evidence of high grade AV block.     2. The longest RR interval was 2110 msec.     DIARY  1. The diary was returned, but not completed    MISCELLANEOUS  1. This was a tape of adequate length (24 hrs).    Labs showed new glucose intolerance, , admit to using lot of sugar recently. CK is elevated with back pain. Last Lipid in 11/2013 LDL-C was 86.4. Sleep evaluation next month.     Since visit of 3/28/2014, stressed due to close sister illnesses with Alzheimer in [place], stayed there for 8 weeks and recent return with weight gain. No definite muscular  pains still with mild chronic low back pain. CP is better, occasional burning.  Home BP reviewed, mostly > 150/80, have occasional HA, every other week. Using HCTZ 25 mg , half tab every other day. Not yet to sleep evaluation. Discussed potential cause of resistant HTN due to untreated GRICELDA.  Labs reviewed CPK remains elevated 362 to 421, FBS improved from 127 to 110. Renal stable with Cr 1.6 to 1.5, K+ 4.1. Lipid LDL-C 69.8. Discussed optional of trying 10 mg of atorvastatin or continuing on 40 mg and be aware of muscle pains / weakness and to monitor CPK.    Since visit of 6/26, no new problem, labs showed slow progressive CKD eGFR now 40, Cr 1.7, BUN 25, . Want to see nephrologist.  Had Sleep study on 6/30 - CONCLUSION:  Nocturnal polysomnography demonstrates:  1.  Obstructive sleep apnea to be present with 44.2 events an hour with    desaturation to 81%.  2.  Sinus rhythm with occasional PVC.     PLAN:  He will return for nasal CPAP titration at a later date.    Since visit of 7/25, OK til 3 weeks ago, started to experience ARMAS, any rushing, similar to prior to CABG. Some CP, more like burning, grade 1/10, last until relax. ECG today SA, rate 56. 1st degree AVB, LVH with ST abnormalities. Called for earlier appointment. No problem with the medications. Just started CPAP this past Sunday, 3 days ago. Last lab again reviewed - K+ 4.1. Had prior problem with spironolactone. Home /95.     Since visit of 9/10, the CP and ARMAS have improved. Could not tolerate eplerenone 25 mg due to marked bradycardia, rate to 40 along with tiredness and weakness, not the expected side effects. Home /79, feeling better except for dry cough, nasal congestion, and bad HA, recurrent problem over the years. Best Rx with short course of Augmentin. Given Doxycycline without benefit. Lab today , BMP with Cr stable at 1.6, K+ 4.0. Also wants review with GI on GERD, and not able to lose weight.    Since visit of  9/24/2014, no new problem, no problem with medications. GRICELDA reviewed, troubled by being awaken hourly during testing. Denies any fatigue, lots of energy. Labs - stable CKD eGFR 42, , K+ 4.1. Last urine 6/2013. Home BP good at 140/80-85. 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     Since visit of 12/5/2014, had problem with diverticulitis last month now corrected diet, no nuts, more fiber. Denies any CP nor SOB. No sleepiness. Weight up and down. Recent labs A1C 6.2%, LDL-C 59, Hgb 13.3, CMP showed eGFR 39 with Cr 1.7, K+ 3.8.    Since visit of 3/19/2015, no new problem. Had review with Dr. Álvarez, X-ray negative and completed 6 weeks of PT without much benefit. Being closely followed by Dr. Hodgson, recent labs shows eGFR 39, Cr 1.7, have proteinuria, , mild anemia, Hgb 13.4, iron profile is sufficient. Lipid excellent, LDL 58, non-HDL 89.    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 Today discomfort started after 3 zandra, had to stop for 20 minutes, did not use NTG. Similar problem over the last 8 months. Compliant with medications but home BP are high, 130-188/, HR 65-85. ECG shows NSR PAC, LVH, pulmonary pattern. Last lipid in 8/2015 LDL 58.2, non-HDL 90. Active biking twice a week for 30 minutes then farm walk daily.    Since visit of 2/18, continue to have occasional chest burning when rushing fast across pasture or lifting heavy bags. Has about 5 minutes of discomfort, grade 1/10. Also noted some nighttime dry cough, don't believe due to Lisinopril, like nasal congestion with PND. Daughter have Zyrtec, will try. Discussed options for Rx of Abnormal stress test, had difficult LHC in 2012 along with stage 3 CKD. Will proceed with optimize medical Rx first.   ECHO, 3/2016 CONCLUSIONS     1 - Biatrial enlargement.     2 - Enlarged left ventricular enlargement.     3 - Eccentric hypertrophy.     4 - Low normal to  mildly depressed left ventricular systolic function (EF 50-55%).     5 - Mild to moderate aortic regurgitation.     6 - Mild mitral regurgitation.     7 - Left ventricular diastolic dysfunction. E/e'(lat) is 10.  This along with the following abnormalities (ATUL = 49.13 and LVMI = 181.70) suggests significant diastolic dysfunction.     8 - Right ventricular enlargement with mildly depressed systolic function.     9 - The estimated PA systolic pressure is 28 mmHg.     10 - No significant change from Echo on 12/19/2013.     Lexiscan - Nuclear Quantitative Functional Analysis:   LVEF: 34 % (normal is 47 - 59)  LVED Volume: 192 ml (normal is 91 - 155)  LVES Volume: 126 ml (normal is 40 - 78)    Impression: ABNORMAL MYOCARDIAL PERFUSION  1. There is evidence for moderate myocardial ischemia in the inferolateral wall of the left ventricle with associated stress induced LV cavity dilatation with underlying injury present.   2. There is abnormal wall motion at rest showing moderate global hypokinesis of the left ventricle. severe hypokinesis of the inferolateral wall of the left ventricle.   3. There is resting LV dysfunction with a reduced ejection fraction of 34 %.  (normal is 47 - 59)  4. The left ventricular volume is mildly increased at rest.   5. The extracardiac distribution of radioactivity is normal.   6. When compared to the previous study from 12/19/2013, current study indicate inferolateral defects.  7. [facility] [employer] SSS=10, SRS=5, and SDS=5, suggest that 7% of myocardium may be ischemic.    Since visit of 3/14, feeling better, wanted no change with medications, long discussion on use of Coreg and need for titration. Also labs reviewed, mild anemia due to CKD, stage 3, eGFR 33, decreased from 40, Dr. Hodgson recommend better cholesterol control. , and non- on 80 mg of Atorvastatin. Want better diet.    Since visit of 4/25 had problem on 5/2/2016 with sudden onset of vertigo and right ear pain. Had  "review with Dr. Vasquez and medications changes recommended see telephone encounter note. Now review medications again and vertigo improved after taking Meclizine 4-25 mg tabs daily. Home BP log 127-170/, HR 63-82. Currently back on Coreg 3.125 mg bid. Discuss hope to proceed with Coreg titration with the goal of 25 mg bid.    In 6/2016, had tangled with the trees with review by Dr. Vasquez, right ribs bruised, no fracture, also vertigo with quick turn, fell. Still with some exertional chest burning, average twice a week, but very brief, just seconds. No problem with medications. Had review with Dr. Nails.    In 7/2016, multiple complains, generalized itching started about a week ago, stopped Coreg but itching still there. Also problem with recurrent vertigo exacerbated by head turning or bending, no fall but bothersome, able to do all farm chores. In addition noted to be more tired with the higher dose of Coreg. No SOB nor CP. Recent labs show NICK with Cr 2.2 to 2.5, eGFR down to 24. Will be seeing nephrologist 8/2/2016.    In 12/2016, problem with acute head congestion, frontal, seasonal, usually helped with Augmentin for 10 days. Requesting Rx, option discussed. Feeling "good" in general, able to do work without problem. Ran out of Zetia over a month ago. Lipid test LDL 84.4 on 40 mg of atorvastatin. Home /75-80. Dr. Vasquez had changed ACE-I to Valsartan due to cough.    In 3/2017, find a little tiredness and daytime sleepiness over the last 2 weeks, change to daylight saving made a little worst. 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 Sleep well, 6-8 hours, feel good on awakening. Home /80. Compliant with medications. Some concern of more frequent BM, 4-5 times daily. Eating more fruit. Lipid LDL 62.    In 6/2017, note lot of urine after kidney biopsy about 4 weeks ago. Biopsy reported as OK. Had stomach biopsy about a week ago, since then been feeling "yukki", was informed to " expect it. Home BP is similar to office reading, Remain active on farm, lot of manual work no problem. No more CP nor ARMAS. ECG today suggest WAP, 64, left axis, LVH, PRWP and high lateral T-wave inversion no change from ECG in 2015. No problem with medications. First cousin age 44 yo,  of massive MI, no symptoms.    In 2017, noted bad itching in both legs for about 2 months, not controlled with topical Rx including steroid. Off Valsartan for 3 days without any changed but BP elevated to 150/90, restarted 4 days ago. Half life of 6 hours: therefore adequate off trial. Also have problem with insomnia, Ambien was helpful before. Home BP this am 140/73. No other problem. Will restart Valsartan first, wants to use up Lisinopril supply, advised to first use up current Valsartan then can retry the Lisinopril, understand side effect of dry cough. Vertigo improved post sinus balloon procedure.  Echo 2017  CONCLUSIONS     1 - Mildly enlarged aortic root.     2 - Biatrial enlargement.     3 - Concentric hypertrophy.     4 - No wall motion abnormalities.     5 - Normal left ventricular systolic function (EF 55-60%).     6 - Mild aortic regurgitation.     7 - Mild mitral regurgitation.     8 - Indeterminate LV diastolic function.     9 - Normal right ventricular systolic function .     10 - The estimated PA systolic pressure is greater than 25 mmHg.     11 - Suggest some improvement in LVEF from Echo in 3/2016.     In 2018, report congestion, sinusitis, bronchitis since , used all kind of medications. Renal review on 1/10 showed significant progression of disease, Cr up to 4.3 from 3.3 just 3 months ago. eGFR down to 12. Home BP log reviewed 137 to 176 / 77 to 90, HR 65 to 79. Been taking an addition half of Valsaran 320 mg when SBP > 175 or BBP > 85, recall doing it for 3 times. 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 ECG in 2017 - sinus bradycardia,  "rate 536 left axis, LVH with STT abnormalties.      Review of Systems      Constitutional: no further chills, fevers, and sweats and recurrent afternoon fatigue, less head congestion uses mask outdoor, weight gain of 10 lbs since 9/2017  Eyes: negative for icterus, redness and visual disturbance  Respiratory: negative for asthma, recurrent dry cough with seasonal sinusitis, no dyspnea on exertion, no hemoptysis, pleurisy/chest pain and wheezing, [place] score 0 now 5  Cardiovascular: no further chest pain, chest pressure/discomfort, dyspnea, near-syncope, no further nightly palpitations with less occasional /90, no paroxysmal nocturnal dyspnea and syncope  Gastrointestinal: negative for abdominal pain, nausea and vomiting, no further heart burn on exertion, BM more frequent. Recent black stool post biopsy.  Musculoskeletal: No muscle weakness and myalgias, positive for arthralgias, left knee not as good, benefited from operation, have muscle cramp in the left leg post op  Neurological: negative for coordination problems, no further dizziness after medications, takes in interval, no gait problems, less headaches, likely sinus, no paresthesia, tremors and vertigo, sleeping 8 hours nightly.  Psych: no depression nor anxious, close sister (71 year old) passed in [place] 1/2016.    Objective:    Physical Exam     Constitutional: He appears healthy. No distress.   HENT:   Mouth/Throat: Dentition is normal.   Eyes: Conjunctivae are normal.   Neck: Thyroid normal. Neck supple. Circumference 15.5"  Cardiovascular: Normal rate, regular rhythm and normal pulses. PMI is not displaced. Exam reveals no gallop.   Medium-pitched machinery crescendo-decrescendo early systolic murmur is present with a grade of 3/6 at the upper right sternal border, upper left sternal border and apex   Pulses:   Carotid pulses are 2+ on the right side, and 2+ on the left side.   Dorsalis pedis pulses are 2+ on the right side, and 2+ on the " "left side.   Pulmonary/Chest: Breath sounds normal. He exhibits no tenderness.   Abdominal: Soft, very active bowel sounds. Waist 46"  Extremities: no pitting edema around the sock line.   Neurological: He is alert and oriented to person, place, and time. Gait normal.   Skin: Skin is warm and dry. No cyanosis. No pallor. Nails show no clubbing.     Assessment:       1. CKD (chronic kidney disease), stage III, eGFR 40, 7/2014, stage 4, eGFR 12, 1/2018    2. Essential hypertension    3. Coronary artery disease involving native coronary artery of native heart without angina pectoris    4. Hypertensive left ventricular hypertrophy, without heart failure    5. Obesity, Class I, BMI 32.8 to 34.3, 32.7, today 32.6         Plan:   Micheal was seen today for follow-up.    Diagnoses and associated orders for this visit:    CKD (chronic kidney disease), stage III, eGFR 40, 7/2014, stage 4, eGFR 12, 1/2018    Essential hypertension    Coronary artery disease involving native coronary artery of native heart without angina pectoris    Hypertensive left ventricular hypertrophy, without heart failure    Obesity, Class I, BMI 32.8 to 34.3, 32.7, today 32.6    - CV status stable, continue current Rx, all medications reviewed, patient acknowledge good understanding.  - Need close follow up with Dr. Rojo in regard to BP Rx   Instruction for Mediterranean diet and heart healthy exercise given.  - Weigh twice weekly, try to lose 1-2 lbs per week  - Belly exercise daily  - Follow up in 3 months per patient's preference  -     Reduce carvedilol (COREG) 6.25 MG tablet; Take 1 tablet (6.25 mg total) by mouth 2 (two) times daily with meals.  Dispense: 90 tablet; Refill: 3    Vertigo - felt may be due to chronic sinusitis    Chronic fatigue - improved    Benign non-nodular prostatic hyperplasia without lower urinary tract symptoms  -     Change terazosin (HYTRIN) 10 MG capsule; Take 2 capsules (20 mg total) by mouth every evening.  Dispense: 180 " capsule; Refill: 3    Angina of effort, onset 6/2015 - resolved    Abnormal cardiovascular stress test  - Check home blood pressure, 2 days weekly, do 2 readings within 5 minutes in AM and PM, keep log for review.    Proteinuria    Diastolic dysfunction    ARMAS (dyspnea on exertion) - improved    Anemia, mild    Abdominal obesity    OA, post left TKR      Patient Active Problem List   Diagnosis    Osteoarthritis of right knee, L-TKR 10/2012    Nocturia    Hematuria    BPH (benign prostatic hyperplasia)    Carotid stenosis    HTN (hypertension),     Hyperlipidemia, baseline     CAD (coronary artery disease), 2V CABG 2007    Gout, arthritis    Obesity, Class I, BMI 32.8 to 34.3, 32.7, today 32.6    Vertigo    LVH (left ventricular hypertrophy) due to hypertensive disease    Abnormal cardiovascular stress test    CKD (chronic kidney disease), stage III, eGFR 40, 7/2014, stage 4, eGFR 12, 1/2018    GERD (gastroesophageal reflux disease)    Elevated PSA    Sleep disorder    Acquired hammer toe    Diastolic dysfunction    Abdominal obesity    Back pain, chronic    Glucose intolerance (impaired glucose tolerance)    Anemia, mild    Gastric nodule    Diverticulitis, 2005, 2015    Personal history of colonic polyps    PSA elevation    DDD (degenerative disc disease), lumbar    LV dysfunction, EF 50%, reduced to 34%    Other spondylosis, lumbar region    Knee pain    NICK (acute kidney injury)    BPH with obstruction/lower urinary tract symptoms    Itching, both legs, onset 7/2017     Total face-to-face time with the patient was 30 minutes and greater than 50% was spent in counseling and coordination of care. The above assessment and plan have been discussed at length. Labs and procedure reviewed. Problem List updated. Asked to bring in all active medications / pills bottles with next visit.

## 2018-01-29 NOTE — PATIENT INSTRUCTIONS
Chronic Kidney Disease (CKD)     The role of the kidneys is to remove waste products and extra water from the blood.  When the kidneys do not work as they should, waste products begin to build up in the blood. This is called chronic kidney disease (CKD). CKD means that you have kidney damage or a decrease in kidney function lasting at least 3 months. CKD allows extra water, waste, and toxins to build up in the body. This can eventually become life-threatening. You might need dialysis or a kidney transplant to stay alive. This most severe form is called end stage renal disease.  Diabetes is the leading causes of chronic renal failure. Other causes include high blood pressure, hardening of the arteries (atherosclerosis), lupus, inflammation of the blood vessels (vasculitis), and past viral or bacterial infections. Certain over-the-counter pain medicines can cause renal failure when taken often over a long period of time. These include aspirin, ibuprofen, and related anti-inflammatory medicines called NSAIDs (nonsteroidal anti-inflammatory drugs).  Home care  The following guidelines will help you care for yourself at home:  · If you have diabetes, talk with your healthcare provider about keeping your blood sugar under control. Ask if you need to make and changes to your diet, lifestyle, or medicines.  · If you have high blood pressure:  ¨ Take prescribed medicine to lower your blood pressure to the recommended goal of less than 130/80.  ¨ Start a regular exercise program that you enjoy. Check with your healthcare provider to be sure your planned exercise program is right for you.  ¨ Eat less salt (sodium). Your healthcare provider can tell you how much salt per day is safe for you.  · If you are overweight, talk with your healthcare provider about a weight loss plan.  · If you smoke, you must quit. Smoking makes kidney disease worse. Talk with your healthcare provider about ways to help you quit.  For more  information, visit the following links:  ¨ www.smokefree.gov/sites/default/files/pdf/clearing-the-air-accessible.pdf  ¨ www.smokefree.gov  ¨ www.cancer.org/healthy/stayawayfromtobacco/guidetoquittingsmoking/  · Most people with CKD need to follow a special diet.  Be sure you understand yours. In general, you will need to limit protein, salt, potassium, and phosphorus. You also need to limit how much fluid you drink.   · CKD is a risk factor for heart disease. Talk with your healthcare provider about any other risk factors you might have and what you can do to lessen them.  · Talk with your healthcare provider about any medicines you are taking to find out if they need to be reduced or stopped.  · Don't use the following over-the-counter medicines, or consult your healthcare provider before using:  ¨ Aspirin and NSAIDs such as ibuprofen or naproxen. Using acetaminophen for fever or pain is OK.  ¨ Laxatives and antacids containing magnesium or aluminum  ¨ Fleet or phospho soda enemas containing phosphorus  ¨ Certain stomach acid-blocking medicine such as cimetidine or ranitidine   ¨ Decongestants containing pseudoephedrine   ¨ Herbal supplements  Follow-up care  Follow up with your healthcare provider, or as advised. Contact one of the following for more information:  · American Association of Kidney Patients 328-720-5141 www.aakp.org  · National Kidney Foundation 784-877-5101 www.kidney.org  · American Kidney Fund 308-553-9248 www.kidneyfund.org  · National Kidney Disease Education Program 866-4KIDNEY www.nkdep.nih.gov  If an X-ray, ECG (cardiogram), or other diagnostic test was taken, you will be told of any new findings that may affect your care.  Call 911  Call 911 if you have any of the following:  · Severe weakness, dizziness, fainting, drowsiness, or confusion  · Chest pain or shortness of breath  · Heart beating fast, slow, or irregularly  When to seek medical advice  Call your healthcare provider right away  if any of these occur:  · Nausea or vomiting  · Fever of 100.4°F (38°C) or higher, or as directed by your healthcare provider  · Unexpected weight gain or swelling in the legs, ankles, or around the eyes  · Decrease or absent urine output  Date Last Reviewed: 9/1/2016  © 3125-7466 [employer]. [address] [place] [address]. All rights reserved. This information is not intended as a substitute for professional medical care. Always follow your healthcare professional's instructions.        Diet for Chronic Kidney Disease  Following a special diet when you have kidney disease can help you stay as healthy as possible. Your healthcare provider or dietitian should make a special diet plan just for you.    Eating right  Here are some good eating rules to follow:  · Protein. Eating protein is important for your body. But too much protein can put a strain on your kidneys. Eating less protein may slow the progression of chronic kidney disease. Foods high in protein include meat, fish, eggs, cheese, and other dairy products. A registered dietitian can help you plan a diet that has the right amount of protein for you.  · Sodium. Having too much salt in your diet can make your body hold onto (retain) water. Ask your provider or dietitian how much sodium per day you are allowed. This will help you avoid fluid buildup in your body (fluid retention). It can also help control high blood pressure. Learn to read food labels to know how much sodium is in one serving. Foods high in salt include processed meats, canned and boxed foods, sauces, salted chips and snacks, pickled foods, frozen dinners, and restaurant and fast food.  · Fluids. If you have advanced kidney disease, you will need to limit the water and fluids you drink. If you dont, then too much water will build up in your body. The exact amount of fluid you can drink depends on how well your kidneys are working. Ask your provider how much water you  can safely drink each day.  · Potassium. In advanced kidney disease, your potassium level can go dangerously high. This affects your heart. It can cause an irregular heartbeat (arrhythmia). Ask your provider or dietitian if you should limit potassium in your diet. Foods high in potassium include dairy products (milk, yogurt, cheese), dried beans, bananas, oranges, potatoes, tomatoes, spinach, cantaloupe, honeydew melon, dried fruits, and nuts.   · Calcium. Calcium is important to build strong bones. But foods high in calcium are also high in phosphorus, which can take calcium from your bones. Limiting foods high in phosphorus will help keep calcium in your bones. Ask your provider how much calcium you should get each day.  · Phosphorus. In advanced kidney disease, your phosphorus level can go dangerously high. This affects many systems in the body and can damage your heart. Limit your intake of phosphorus-rich foods. These include dried beans and peas, nuts, peanut butter, cocoa, beer, cola drinks, and dairy products.  Date Last Reviewed: 8/1/2016  © 3623-2214 [employer]. [address] [place] [address]. All rights reserved. This information is not intended as a substitute for professional medical care. Always follow your healthcare professional's instructions.        Discharge Instructions: Taking Your Blood Pressure  Blood pressure is the force of blood as it moves from the heart through the blood vessels. You can take your own blood pressure reading using a digital monitor. Take readings as often as your healthcare provider instructs. Take your readings each time in the same way, using the same arm. Here are guidelines for taking your blood pressure.  The American Heart Association (AHA) recommends purchasing a blood pressure monitor that is validated and approved by the Association for the Advancement of Medical Instrumentation, the [place] Hypertension Society, and the International  Protocol for the Validation of Automated BP Measuring Devices. If the blood pressure monitor is for a senior adult, a pregnant woman, or a child, make certain it is validated for use with such a population. For the most reliable readings, the AHA recommends an automatic, cuff-style, upper arm (bicep) monitor. The readings from finger and wrist monitors are not as reliable as the upper arm monitor.        Step 1. Relax    · Wait at least a half hour after smoking, eating, or exercising. Do not drink coffee, tea, soda, or other caffeinated beverages before checking your blood pressure.   · Sit comfortably at a table. Place the monitor near you.  · Rest for a few minutes before you begin.        Step 2. Wrap the cuff    · Place your arm on the table, palm up. Put your arm in a position that is level with your heart. Wrap the cuff around your upper arm, about an inch above your elbow. Its best to wrap the cuff on bare skin, not over clothing.  · Make sure your cuff fits. If it doesnt wrap around your upper arm, order a larger cuff. A cuff that is too large or too small can result in an inaccurate blood pressure reading.           Step 3. Inflate the cuff    · Pump the cuff until the scale reads 200. If you have a self-inflating cuff, push the button that starts the pump.  · The cuff will tighten, then loosen.  · The numbers will change. When they stop changing, your blood pressure reading will appear.  · If you get a reading that is too high or too low for you, relax for a few minutes. Then do the test again.    Step 4. Write down the results  · Write down your blood pressure numbers. Daljit the date and time. Keep your results in one place, such as a notebook.  · Remove the cuff from your arm. Turn off the machine.  · Take the readings with you to your medical appointments.  · If you start a new blood pressure medicine, or change a blood pressure medicine dose, note the day you started the new drug or dosage on your  blood pressure recording sheet. This will help your healthcare provider monitor the effect of medication changes.     Date Last Reviewed: 4/27/2016 © 2000-2016 [employer]. [address] [place] [address], [place] [address]. All rights reserved. This information is not intended as a substitute for professional medical care. Always follow your healthcare professional's instructions.        Controlling High Blood Pressure  High blood pressure (hypertension) is often called the silent killer. This is because many people who have it dont know it. High blood pressure is defined as 140/90 mm Hg or higher. Know your blood pressure and remember to check it regularly. Doing so can save your life. Here are some things you can do to help control your blood pressure.    Choose heart-healthy foods  · Select low-salt, low-fat foods. Limit sodium intake to 2,400 mg per day or the amount suggested by your healthcare provider.  · Limit canned, dried, cured, packaged, and fast foods. These can contain a lot of salt.  · Eat 8 to 10 servings of fruits and vegetables every day.  · Choose lean meats, fish, or chicken.  · Eat whole-grain pasta, brown rice, and beans.  · Eat 2 to 3 servings of low-fat or fat-free dairy products.  · Ask your doctor about the DASH eating plan. This plan helps reduce blood pressure.  · When you go to a restaurant, ask that your meal be prepared with no added salt.  Maintain a healthy weight  · Ask your healthcare provider how many calories to eat a day. Then stick to that number.  · Ask your healthcare provider what weight range is healthiest for you. If you are overweight, a weight loss of only 3% to 5% of your body weight can help lower blood pressure. Generally, a good weight loss goal is to lose 10% of your body weight in a year.  · Limit snacks and sweets.  · Get regular exercise.  Get up and get active  · Choose activities you enjoy. Find ones you can do with friends or family. This includes  bicycling, dancing, walking, and jogging.  · Park farther away from building entrances.  · Use stairs instead of the elevator.  · When you can, walk or bike instead of driving.  · [place] leaves, garden, or do household repairs.  · Be active at a moderate to vigorous level of physical activity for at least 40 minutes for a minimum of 3 to 4 days a week.   Manage stress  · Make time to relax and enjoy life. Find time to laugh.  · Communicate your concerns with your loved ones and your healthcare provider.  · Visit with family and friends, and keep up with hobbies.  Limit alcohol and quit smoking  · Men should have no more than 2 drinks per day.  · Women should have no more than 1 drink per day.  · Talk with your healthcare provider about quitting smoking. Smoking significantly increases your risk for heart disease and stroke. Ask your healthcare provider about community smoking cessation programs and other options.  Medicines  If lifestyle changes arent enough, your healthcare provider may prescribe high blood pressure medicine. Take all medicines as prescribed. If you have any questions about your medicines, ask your healthcare provider before stopping or changing them.   Date Last Reviewed: 4/27/2016  © 5520-9334 [employer]. [address] [place] [address], [place] [address]. All rights reserved. This information is not intended as a substitute for professional medical care. Always follow your healthcare professional's instructions.        Uncontrolled High Blood Pressure (Established)    Your blood pressure was unusually high today. This can occur if youve missed doses of your blood pressure medicine. Or it can happen if you are taking other medicines. These include some asthma inhalers, decongestants, diet pills, and street drugs like cocaine and amphetamine.  Other causes include:  · Weight gain  · More salt in your diet  · Smoking  · Caffeine  Your blood pressure can also rise if you are emotionally upset  or in intense pain. It may go back to normal after a period of rest.  A blood pressure reading is made up of 2 numbers. There is a top number over a bottom number. The top number is the systolic pressure. The bottom number is the diastolic pressure. A normal blood pressure is a systolic pressure of less than 120 over a diastolic pressure of less than 80. High blood pressure (hypertension) is when the top number is 140 or higher. Or it is when the bottom number is 90 or higher. You will see your blood pressure readings written together. For example, a person with a systolic pressure of 118 and a diastolic pressure of 78 will have 118/78 written in the medical record. To be high blood pressure, the numbers must be higher when tested over a period of time. The blood pressures between normal and hypertension are called prehypertension. Prehypertension is a warning sign. The information gives you a chance to make lifestyle changes (weight loss, more exercise) that can keep your blood pressure from going higher.  Home care  Its important to take steps to lower your blood pressure. If you are taking blood pressure medicine, the guidelines below may help you need less or no medicines in the future.  · Begin a weight-loss program if you are overweight.  · Cut back on the amount of salt in your diet:  ¨ Avoid high-salt foods like olives, pickles, smoked meats, and salted potato chips.  ¨ Dont add salt to your food at the table.  ¨ Use only small amounts of salt when cooking.  · Begin an exercise program. Talk with your health care provider about what exercise program is best for you. It doesnt have to be difficult. Even brisk walking for 20 minutes 3 times a week is a good form of exercise.  · Avoid medicines that stimulates the heart. This includes many over-the-counter cold and sinus decongestant pills and sprays, as well as diet pills. Check the warnings about hypertension on the label. Before purchasing any  over-the-counter medicines or supplements, always ask the pharmacist about the product's potential interaction with your high blood pressure and your medicines.  · Stimulants such as amphetamine or cocaine could be lethal for someone with hypertension. Never take these.  · Limit how much caffeine you drink. Or switch to noncaffeinated beverages.  · Stop smoking. If you are a long-time smoker, this can be hard. Enroll in a stop-smoking program to make it more likely that you will succeed. Talk with your provider about ways to quit.  · Learn how to handle stress better. This is an important part of any program to lower blood pressure. Learn ways to relax. These include meditation, yoga, and biofeedback.  · If medicines were prescribed, take them exactly as directed. Missing doses may cause your blood pressure to get out of control.  · If you miss a dose or doses of your medicines, check with your healthcare provider or pharmacist about what to do.  · Consider buying an automatic blood pressure machine. Your provider may recommend a certain type. You can get one of these at most pharmacies. Measure your blood pressure twice a day, in the morning, and in the late afternoon. Keep a written record of your home blood pressure readings and take the record to your medical appointments.  Here are some additional guidelines on home blood pressure monitoring from the American Heart Association.  · Don't smoke or drink coffee for 30 minutes  · Go to the bathroom before the test.  · Relax for 5 minutes before taking the measurement.  · Sit correctly. Be sure your back is supported. Don't sit on a couch or soft chair. Uncross your feet and place them flat on the floor. Place your arm on a solid, flat surface like a table with the upper arm at heart level. Make certain the middle of the cuff is directly above the eye of the elbow. Check the monitor's instruction manual for an illustration.  · Take multiple readings. When you  measure, take 2 or 3 readings one minute apart and record all of the results.  · Take your blood pressure at the same time every day, or as your healthcare provider recommends.  · Record the date, time, and blood pressure reading.  · Take the record with you to your next appointment. If your blood pressure monitor has a built-in memory, simply take the monitor with you to your next appointment.  · Call your provider if you have several high readings. Don't be frightened by a single high reading, but if you get several high readings, check in with your healthcare provider.  · Note: When blood pressure reaches a systolic (top number) of 180 or higher or a diastolic (bottom number) of 110 or higher, emergency medical treatment is required. Call your healthcare provider immediately.  Follow-up care  Regular visits to your own healthcare provider for blood pressure and medicine checks are an important part of your care. Make a follow-up appointment as directed. Bring the record of your home blood pressure readings to the appointment.  When to seek medical advice  Call your healthcare provider right away if any of these occur:  · Blood pressure reaches a systolic (top number) of 180 or higher or diastolic (bottom number) of 110 or higher, emergency medical treatment is required.  · Chest, arm, shoulder, neck, or upper back pain  · Shortness of breath  · Severe headache  · Throbbing or rushing sound in the ears  · Nosebleed  · Extreme drowsiness, confusion, or fainting  · Dizziness or dizziness with spinning sensation (vertigo)  · Weakness in an arm or leg or on one side of the face  · Trouble speaking or seeing   Date Last Reviewed: 1/1/2017 © 2000-2017 [employer]. [address] [place] [address], [place] [address]. All rights reserved. This information is not intended as a substitute for professional medical care. Always follow your healthcare professional's instructions.        Your High Blood Pressure Risk  Factors  Risk factors are things that make you more likely to have a disease or condition. Do you know your risk factors for high blood pressure? You cant do anything about some risk factors. But other risk factors are things that can be changed. Know what high blood pressure risk factors you have. Then find out what changes you can make to help control your risk for high blood pressure. Start with the change that you think will be easiest for you.  Risk factors you cant control  Though you cant change any of the things listed below, check off the ones that apply to you. The more boxes you check, the greater your risk for high blood pressure.  Family history  ? One or both of your parents or grandparents has had high blood pressure or heart disease.  Gender and age  ? Youre a man over age 55 or a postmenopausal woman.  Risk factors you can control  There are plenty of risk factors for high blood pressure that you can control. Learn what these risk factors are and then find out how to reduce your risk. Check the ones that apply to you.  ? What you eat  Do you eat a lot of salty, fatty, fried, or greasy foods? Do you go to restaurants or eat out frequently?  ? Alcohol consumption  Do you drink more than 1 drink a day if you are a female or more than 2 drinks a day if you male?   ? If you smoke or someone close to you smokes  Do you smoke cigarettes or cigars, chew tobacco, or dip snuff? Are you exposed to second-hand smoke on a regular basis?  ? How active you are   Are you inactive most of the time at work and at home? Do you go weeks without exercising?  ? Your weight   Has your doctor said that you are 15 or more pounds overweight?  ? Your stress level    Do you often feel anxious, nervous, and stressed? Do you feel that you don't have a supportive environment?  Date Last Reviewed: 4/27/2016  © 7212-8330 [employer] [address] [place] [address], [place] [address]. All rights reserved. This information  "is not intended as a substitute for professional medical care. Always follow your healthcare professional's instructions.        Discharge Instructions for High Blood Pressure (Hypertension)  You have been diagnosed with high blood pressure (also called hypertension). This means the force of blood against your artery walls is too strong. It also means your heart is working hard to move blood. High blood pressure usually has no symptoms, but over time, it can damage your heart, blood vessels, eyes, kidneys, and other organs. With help from your doctor, you can manage your blood pressure and protect your health.  Taking medicine  · Learn to take your own blood pressure. Keep a record of your results. Ask your doctor which readings mean that you need medical attention.  · Take your blood pressure medicine exactly as directed. Dont skip doses. Missing doses can cause your blood pressure to get out of control.  · If you do miss a dose (or doses) check with your healthcare provider about what to do.  · Avoid medicine that contain heart stimulants, including over-the-counter drugs. Check for warnings about high blood pressure on the label. Ask the pharmacist before purchasing something you haven't used before  · Check with your doctor or pharmacist before taking a decongestant. Some decongestants can worsen high blood pressure.  Lifestyle changes  · Maintain a healthy weight. Get help to lose any extra pounds.  · Cut back on salt.  ¨ Limit canned, dried, packaged, and fast foods.  ¨ Dont add salt to your food at the table.  ¨ Season foods with herbs instead of salt when you cook.  ¨ Request no added salt when you go to a restaurant.  ¨ The American Heart Associations (AHA) "ideal" sodium intake recommendation is 1,500 milligrams per day.  However, since American's eat so much salt, the AHA says a positive change can occur by cutting back to even 2,400 milligrams of sodium a day.   · Follow the DASH (Dietary Approaches " to Stop Hypertension) eating plan. This plan recommends vegetables, fruits, whole gains, and other heart healthy foods.  · Begin an exercise program. Ask your doctor how to get started. The American Heart Association recommends aerobic exercise 3 to 4 times a week for an average of 40 minutes at a time, with your doctor's approval. Simple activities like walking or gardening can help.  · Break the smoking habit. Enroll in a stop-smoking program to improve your chances of success. Ask your healthcare provider about programs and medicines to help you stop smoking.  · Limit drinks that contain caffeine (coffee, black or green tea, cola) to 2 per day.  · Never take stimulants such as amphetamines or cocaine; these drugs can be deadly for someone with high blood pressure.  · Control your stress. Learn stress-management techniques.  · Limit alcohol to no more than 1 drink a day for women and 2 drinks a day for men.  Follow-up care  Make a follow-up appointment as directed by our staff.     When to seek medical care  Call your doctor immediately or seek emergency care if you have any of the following:  · Chest pain or shortness of breath (call 911)  · Moderate to severe headache  · Weakness in the muscles of your face, arms, or legs  · Trouble speaking  · Extreme drowsiness  · Confusion  · Fainting or dizziness  · Pulsating or rushing sound in your ears  · Unexplained nosebleed  · Weakness, tingling, or numbness of your face, arms, or legs  · Change in vision  · Blood pressure measured at home that is greater than 180/110   Date Last Reviewed: 4/27/2016  © 8694-8727 [employer]. [address] [place] [address], [place] [address]. All rights reserved. This information is not intended as a substitute for professional medical care. Always follow your healthcare professional's instructions.

## 2018-01-30 ENCOUNTER — TELEPHONE (OUTPATIENT)
Dept: CARDIOLOGY | Facility: CLINIC | Age: 78
End: 2018-01-30

## 2018-01-30 NOTE — TELEPHONE ENCOUNTER
----- Message from Ellis Arnold sent at 1/30/2018 10:10 AM CST -----  Contact: Daughter, Tonya  Placed call to pod, patient miss call from your office please call back at [phone]

## 2018-01-30 NOTE — TELEPHONE ENCOUNTER
Returned pts daughter phone call. Pts daughter had several questions regarding her fathers appointment yesterday and want to speak with Dr. Dunlap. Informed pts daughter that Dr. Dunlap is not here today and I would send him a message to see if he could call her. Pts daughter verbalized understanding. No further issues discussed. Message sent to Dr. Dunlap.

## 2018-02-05 ENCOUNTER — PATIENT MESSAGE (OUTPATIENT)
Dept: FAMILY MEDICINE | Facility: CLINIC | Age: 78
End: 2018-02-05

## 2018-02-06 ENCOUNTER — TELEPHONE (OUTPATIENT)
Dept: FAMILY MEDICINE | Facility: CLINIC | Age: 78
End: 2018-02-06

## 2018-02-06 NOTE — TELEPHONE ENCOUNTER
----- Message from Ellis Arnold sent at 2/6/2018  8:59 AM CST -----  Contact: self   Patient want to speak with a nurse regarding his health and blood work please call back at [phone] (home)

## 2018-02-12 ENCOUNTER — TELEPHONE (OUTPATIENT)
Dept: FAMILY MEDICINE | Facility: CLINIC | Age: 78
End: 2018-02-12

## 2018-02-12 NOTE — TELEPHONE ENCOUNTER
----- Message from Latoya Loomis sent at 2/12/2018  7:22 AM CST -----  Patient requesting to speak with nurse concerning upcoming appointment on Thursday, February 15th/needs to know if should have lab work done before/please call patient back at [phone] to advise.

## 2018-02-13 NOTE — TELEPHONE ENCOUNTER
He does need to follow ith nephrology to chronic renal insufficiency and mild anemia due to kidney disease. No need for further labs at this time.

## 2018-02-15 ENCOUNTER — OFFICE VISIT (OUTPATIENT)
Dept: FAMILY MEDICINE | Facility: CLINIC | Age: 78
End: 2018-02-15
Payer: MEDICARE

## 2018-02-15 ENCOUNTER — LAB VISIT (OUTPATIENT)
Dept: LAB | Facility: HOSPITAL | Age: 78
End: 2018-02-15
Attending: FAMILY MEDICINE
Payer: MEDICARE

## 2018-02-15 VITALS
SYSTOLIC BLOOD PRESSURE: 123 MMHG | HEART RATE: 61 BPM | DIASTOLIC BLOOD PRESSURE: 68 MMHG | WEIGHT: 235.88 LBS | HEIGHT: 73 IN | BODY MASS INDEX: 31.26 KG/M2 | TEMPERATURE: 99 F

## 2018-02-15 DIAGNOSIS — N13.8 BENIGN PROSTATIC HYPERPLASIA WITH URINARY OBSTRUCTION: ICD-10-CM

## 2018-02-15 DIAGNOSIS — E79.0 HYPERURICEMIA: ICD-10-CM

## 2018-02-15 DIAGNOSIS — N40.1 BENIGN PROSTATIC HYPERPLASIA WITH URINARY OBSTRUCTION: ICD-10-CM

## 2018-02-15 DIAGNOSIS — I10 ESSENTIAL HYPERTENSION: Primary | ICD-10-CM

## 2018-02-15 DIAGNOSIS — I11.9 HYPERTENSIVE LEFT VENTRICULAR HYPERTROPHY, WITHOUT HEART FAILURE: ICD-10-CM

## 2018-02-15 LAB — URATE SERPL-MCNC: 5 MG/DL

## 2018-02-15 PROCEDURE — 1126F AMNT PAIN NOTED NONE PRSNT: CPT | Mod: ,,, | Performed by: FAMILY MEDICINE

## 2018-02-15 PROCEDURE — 84550 ASSAY OF BLOOD/URIC ACID: CPT

## 2018-02-15 PROCEDURE — 36415 COLL VENOUS BLD VENIPUNCTURE: CPT | Mod: PO

## 2018-02-15 PROCEDURE — 99213 OFFICE O/P EST LOW 20 MIN: CPT | Mod: PBBFAC,PO | Performed by: FAMILY MEDICINE

## 2018-02-15 PROCEDURE — 99214 OFFICE O/P EST MOD 30 MIN: CPT | Mod: S$PBB,,, | Performed by: FAMILY MEDICINE

## 2018-02-15 PROCEDURE — 1159F MED LIST DOCD IN RCRD: CPT | Mod: ,,, | Performed by: FAMILY MEDICINE

## 2018-02-15 PROCEDURE — 99999 PR PBB SHADOW E&M-EST. PATIENT-LVL III: CPT | Mod: PBBFAC,,, | Performed by: FAMILY MEDICINE

## 2018-02-15 RX ORDER — VALSARTAN 160 MG/1
160 TABLET ORAL DAILY
Qty: 90 TABLET | Refills: 3 | Status: SHIPPED | OUTPATIENT
Start: 2018-02-15 | End: 2018-03-16 | Stop reason: SDUPTHER

## 2018-02-15 NOTE — PATIENT INSTRUCTIONS
4 Steps for Eating Healthier  Changing the way you eat can improve your health. It can lower your cholesterol and blood pressure, and help you stay at a healthy weight. Your diet doesnt have to be bland and boring to be healthy. Just watch your calories and follow these steps:    1. Eat fewer unhealthy fats  · Choose more fish and lean meats instead of fatty cuts of meat.  · Skip butter and lard, and use less margarine.  · Pass on foods that have palm, coconut, or hydrogenated oils.  · Eat fewer high-fat dairy foods like cheese, ice cream, and whole milk.  · Get a heart-healthy cookbook and try some low-fat recipes.  2. Go light on salt  · Keep the saltshaker off the table.  · Limit high-salt ingredients, such as soy sauce, bouillon, and garlic salt.  · Instead of adding salt when cooking, season your food with herbs and flavorings. Try lemon, garlic, and onion.  · Limit convenience foods, such as boxed or canned foods and restaurant food.  · Read food labels and choose lower-sodium options.  3. Limit sugar  · Pause before you add sugars to pancakes, cereal, coffee, or tea. This includes white and brown table sugar, syrup, honey, and molasses. Cut your usual amount by half.  · Use non-sugar sweeteners. Stevia, aspartame, and sucralose can satisfy a sweet tooth without adding calories.  · Swap out sugar-filled soda and other drinks. Buy sugar-free or low-calorie beverages. Remember water is always the best choice.  · Read labels and choose foods with less added sugar. Keep in mind that dairy foods and foods with fruit will have some natural sugar.  · Cut the sugar in recipes by 1/3 to 1/2. Boost the flavor with extracts like almond, vanilla, or orange. Or add spices such as cinnamon or nutmeg.  4. Eat more fiber  · Eat fresh fruits and vegetables every day.  · Boost your diet with whole grains. Go for oats, whole-grain rice, and bran.  · Add beans and lentils to your meals.  · Drink more water to match your fiber  increase. This is to help prevent constipation.  Date Last Reviewed: 5/11/2015  © 9351-0746 [employer]. [address] [place] [address], [place] [address]. All rights reserved. This information is not intended as a substitute for professional medical care. Always follow your healthcare professional's instructions.        Diet for Chronic Kidney Disease  Following a special diet when you have kidney disease can help you stay as healthy as possible. Your healthcare provider or dietitian should make a special diet plan just for you.    Eating right  Here are some good eating rules to follow:  · Protein. Eating protein is important for your body. But too much protein can put a strain on your kidneys. Eating less protein may slow the progression of chronic kidney disease. Foods high in protein include meat, fish, eggs, cheese, and other dairy products. A registered dietitian can help you plan a diet that has the right amount of protein for you.  · Sodium. Having too much salt in your diet can make your body hold onto (retain) water. Ask your provider or dietitian how much sodium per day you are allowed. This will help you avoid fluid buildup in your body (fluid retention). It can also help control high blood pressure. Learn to read food labels to know how much sodium is in one serving. Foods high in salt include processed meats, canned and boxed foods, sauces, salted chips and snacks, pickled foods, frozen dinners, and restaurant and fast food.  · Fluids. If you have advanced kidney disease, you will need to limit the water and fluids you drink. If you dont, then too much water will build up in your body. The exact amount of fluid you can drink depends on how well your kidneys are working. Ask your provider how much water you can safely drink each day.  · Potassium. In advanced kidney disease, your potassium level can go dangerously high. This affects your heart. It can cause an irregular heartbeat (arrhythmia).  Ask your provider or dietitian if you should limit potassium in your diet. Foods high in potassium include dairy products (milk, yogurt, cheese), dried beans, bananas, oranges, potatoes, tomatoes, spinach, cantaloupe, honeydew melon, dried fruits, and nuts.   · Calcium. Calcium is important to build strong bones. But foods high in calcium are also high in phosphorus, which can take calcium from your bones. Limiting foods high in phosphorus will help keep calcium in your bones. Ask your provider how much calcium you should get each day.  · Phosphorus. In advanced kidney disease, your phosphorus level can go dangerously high. This affects many systems in the body and can damage your heart. Limit your intake of phosphorus-rich foods. These include dried beans and peas, nuts, peanut butter, cocoa, beer, cola drinks, and dairy products.  Date Last Reviewed: 8/1/2016  © 9282-7356 [employer]. [address] [place] [address]. All rights reserved. This information is not intended as a substitute for professional medical care. Always follow your healthcare professional's instructions.        Understanding Carbohydrates, Fats, and Protein  Food is a source of fuel and nourishment for your body. Its also a source of pleasure. Having diabetes doesnt mean you have to eat special foods or give up desserts. Instead, your dietitian can show you how to plan meals to suit your body. To start, learn how different foods affect blood sugar.  Carbohydrates  Carbohydrates are the main source of fuel for the body. Carbohydrates raise blood sugar. Many people think carbohydrates are only found in pasta or bread. But carbohydrates are actually in many kinds of foods:  · Sugars occur naturally in foods such as fruit, milk, honey, and molasses. Sugars can also be added to many foods, from cereals and yogurt to candy and desserts. Sugars raise blood sugar.  · Starches are found in bread, cereals, pasta, and dried  beans. Theyre also found in corn, peas, potatoes, yam, acorn squash, and butternut squash. Starches also raise blood sugar.   · Fiber is found in foods such as vegetables, fruits, beans, and whole grains. Unlike other carbs, fiber isnt digested or absorbed. So it doesnt raise blood sugar. In fact, fiber can help keep blood sugar from rising too fast. It also helps keep blood cholesterol at a healthy level.  Did you know?  Even though carbohydrates raise blood sugar, its best to have some in every meal. They are an important part of a healthy diet.   Fat  Fat is an energy source that can be stored until needed. Fat does not raise blood sugar. However, it can raise blood cholesterol, increasing the risk of heart disease. Fat is also high in calories, which can cause weight gain. Not all types of fat are the same.  More Healthy:  · Monounsaturated fats are mostly found in vegetable oils, such as olive, canola, and peanut oils. They are also found in avocados and some nuts. Monounsaturated fats are healthy for your heart. Thats because they lower LDL (unhealthy) cholesterol.  · Polyunsaturated fats are mostly found in vegetable oils, such as corn, safflower, and soybean oils. They are also found in some seeds, nuts, and fish. Polyunsaturated fats lower LDL (unhealthy) cholesterol. So, choosing them instead of saturated fats is healthy for your heart. Certain unsaturated fats can help lower triglycerides.   Less Healthy:  · Saturated fats are found in animal products, such as meat, poultry, whole milk, lard, and butter. Saturated fats raise LDL cholesterol and are not healthy for your heart.  · Hydrogenated oils and trans fats are formed when vegetable oils are processed into solid fats. They are found in many processed foods. Hydrogenated oils and trans fats raise LDL cholesterol and lower HDL (healthy) cholesterol. They are not healthy for your heart.  Protein  Protein helps the body build and repair muscle and  other tissue. Protein has little or no effect on blood sugar. However, many foods that contain protein also contain saturated fat. By choosing low-fat protein sources, you can get the benefits of protein without the extra fat:  · Plant protein is found in dry beans and peas, nuts, and soy products, such as tofu and soymilk. These sources tend to be cholesterol-free and low in saturated fat.  · Animal protein is found in fish, poultry, meat, cheese, milk, and eggs. These contain cholesterol and can be high in saturated fat. Aim for lean, lower-fat choices.  Date Last Reviewed: 3/1/2016  © 4103-4942 [employer]. [address] [place] [address], [place] [address]. All rights reserved. This information is not intended as a substitute for professional medical care. Always follow your healthcare professional's instructions.

## 2018-02-21 ENCOUNTER — OFFICE VISIT (OUTPATIENT)
Dept: PULMONOLOGY | Facility: CLINIC | Age: 78
End: 2018-02-21
Payer: MEDICARE

## 2018-02-21 VITALS
WEIGHT: 227.94 LBS | HEART RATE: 60 BPM | DIASTOLIC BLOOD PRESSURE: 69 MMHG | HEIGHT: 73 IN | SYSTOLIC BLOOD PRESSURE: 130 MMHG | BODY MASS INDEX: 30.21 KG/M2 | OXYGEN SATURATION: 98 %

## 2018-02-21 DIAGNOSIS — J45.30 MILD PERSISTENT ASTHMA WITHOUT COMPLICATION: ICD-10-CM

## 2018-02-21 DIAGNOSIS — J32.9 CHRONIC SINUSITIS, UNSPECIFIED LOCATION: Primary | ICD-10-CM

## 2018-02-21 PROCEDURE — 99205 OFFICE O/P NEW HI 60 MIN: CPT | Mod: S$PBB,,, | Performed by: INTERNAL MEDICINE

## 2018-02-21 PROCEDURE — 99214 OFFICE O/P EST MOD 30 MIN: CPT | Mod: PBBFAC,PO | Performed by: INTERNAL MEDICINE

## 2018-02-21 PROCEDURE — 1126F AMNT PAIN NOTED NONE PRSNT: CPT | Mod: ,,, | Performed by: INTERNAL MEDICINE

## 2018-02-21 PROCEDURE — 99999 PR PBB SHADOW E&M-EST. PATIENT-LVL IV: CPT | Mod: PBBFAC,,, | Performed by: INTERNAL MEDICINE

## 2018-02-21 PROCEDURE — 1159F MED LIST DOCD IN RCRD: CPT | Mod: ,,, | Performed by: INTERNAL MEDICINE

## 2018-02-21 RX ORDER — PREDNISONE 20 MG/1
TABLET ORAL
Qty: 12 TABLET | Refills: 0 | Status: ON HOLD | OUTPATIENT
Start: 2018-02-21 | End: 2018-02-27 | Stop reason: HOSPADM

## 2018-02-21 RX ORDER — MONTELUKAST SODIUM 10 MG/1
10 TABLET ORAL NIGHTLY
Qty: 90 TABLET | Refills: 3 | Status: SHIPPED | OUTPATIENT
Start: 2018-02-21 | End: 2018-06-20

## 2018-02-21 RX ORDER — BUDESONIDE AND FORMOTEROL FUMARATE DIHYDRATE 160; 4.5 UG/1; UG/1
2 AEROSOL RESPIRATORY (INHALATION) EVERY 12 HOURS
Qty: 3 INHALER | Refills: 3 | Status: SHIPPED | OUTPATIENT
Start: 2018-02-21 | End: 2018-07-05

## 2018-02-21 RX ORDER — FLUTICASONE PROPIONATE 50 MCG
2 SPRAY, SUSPENSION (ML) NASAL DAILY
Qty: 3 BOTTLE | Refills: 3 | Status: SHIPPED | OUTPATIENT
Start: 2018-02-21 | End: 2018-11-21 | Stop reason: SDUPTHER

## 2018-02-21 RX ORDER — AMOXICILLIN 500 MG/1
500 CAPSULE ORAL EVERY 12 HOURS
Qty: 28 CAPSULE | Refills: 3 | Status: ON HOLD | OUTPATIENT
Start: 2018-02-21 | End: 2018-02-25

## 2018-02-21 RX ORDER — ALBUTEROL SULFATE 90 UG/1
AEROSOL, METERED RESPIRATORY (INHALATION)
Qty: 3 INHALER | Refills: 3 | Status: SHIPPED | OUTPATIENT
Start: 2018-02-21 | End: 2018-11-21 | Stop reason: SDUPTHER

## 2018-02-21 NOTE — PATIENT INSTRUCTIONS
Chronic sinus may increase chance of asthma.  Amoxil if infection would be good.  Use flonase 2 each side.    Breathing test is close to normal, could have mild copd, but asthma seems more likely.    Controller or preventive asthma therapy - symbicort 2 twice daily, if no problems might get by 2 in morning?  Would avoid chest pain/buring with acitivity.     May use albuterol inhaler as needed in place of nebulizer.    If albuterol not working or cough/wheeze/sob bad - prednisone should clear up asthma/copd.  Use prednisone 20 mg daily for 3 days.    singulair may better control sinus and asthma- stop if not effective   Pt had issues with irreg bleeding and was given Provera on 7-11-17 for 10 days, this calmed the bleeding but has now started up again. Pt is generally regular but has been having a lot of irreg bleeding since beginning of the month.

## 2018-02-21 NOTE — PROGRESS NOTES
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    Has yr round sinus problems - drainage ,  Clear to green,  abx help but avoids with renal dz.  DR Nails did sinus work - saw Dr Bartlett - penuomococcal titers were low, no vaccine    Lots of cough and h/o wheezes, not sob.  Has lung burning off and on  - burns when exerts - releif with rest.  Duration 5-10 min but stops with rest.    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     pft with low MVV, denies sob now nor muscle weakness.    The chief compliant  problem is new to me",   PFSH:  Past Medical History:   Diagnosis Date    Allergy     Arthritis     Gout    BPH (benign prostatic hyperplasia)     CAD (coronary artery disease) 2006    Chronic kidney disease     due to ibuprofen    Colon polyp     Diverticulosis     GERD (gastroesophageal reflux disease)     HTN (hypertension) 3/27/2012    Hyperlipidemia     LVH (left ventricular hypertrophy)     Murmur, cardiac 3/27/2012    GRICELDA (obstructive sleep apnea)     DOES NOT USE A MACHINE    Sinus problem     Syncope and collapse          Past Surgical History:   Procedure Laterality Date    APPENDECTOMY      CARDIAC CATHETERIZATION      COLONOSCOPY  2011    CORONARY ARTERY BYPASS GRAFT  4/2007    x 1    JOINT REPLACEMENT      left knee total replacement  X 3    mid leftt finger      from a cactuss    MOLE REMOVAL  2016    rotative cuff      no rotative cuffs on bilat shoulders has pins     SHOULDER SURGERY      shoulder surgery bilat  RIGHT X 4; LEFT X 3     Social History   Substance Use Topics    Smoking status: Never Smoker    Smokeless tobacco: Never Used    Alcohol use Yes      Comment: rare     Family History   Problem Relation " "Age of Onset    Heart disease Mother     Sudden death Father     Stroke Sister     Heart disease Sister     Kidney cancer Neg Hx     Prostate cancer Neg Hx     Urolithiasis Neg Hx     Allergic rhinitis Neg Hx     Allergies Neg Hx     Angioedema Neg Hx     Asthma Neg Hx     Atopy Neg Hx     Eczema Neg Hx     Immunodeficiency Neg Hx     Rhinitis Neg Hx     Urticaria Neg Hx      Review of patient's allergies indicates:   Allergen Reactions    Ace inhibitors Other (See Comments)     Cough    Arb-angiotensin receptor antagonist Itching    Eplerenone Other (See Comments)     Marked bradycardia, 40, tiredness and weakness         Performance Status:The patient's activity level is functions out of house.      Review of Systems:  a review of eleven systems covering constitutional, Eye, HEENT, Psych, Respiratory, Cardiac, GI, , Musculoskeletal, Endocrine, Dermatologic was negative except for pertinent findings as listed ABOVE and below:  pertinent positive as above, rest is good       Exam:Comprehensive exam done. /69 (BP Location: Left arm, Patient Position: Sitting)   Pulse 60   Ht 6' 1" (1.854 m)   Wt 103.4 kg (227 lb 15.3 oz)   SpO2 98%   BMI 30.08 kg/m²   Exam included Vitals as listed, and patient's appearance and affect and alertness and mood, oral exam for yeast and hygiene and pharynx lesions and Mallapatti (M) score, neck with inspection for jvd and masses and thyroid abnormalities and lymph nodes (supraclavicular and infraclavicular nodes and axillary also examined and noted if abn), chest exam included symmetry and effort and fremitus and percussion and auscultation, cardiac exam included rhythm and gallops and murmur and rubs and jvd and edema, abdominal exam for mass and hepatosplenomegaly and tenderness and hernias and bowel sounds, Musculoskeletal exam with muscle tone and posture and mobility/gait and  strength, and skin for rashes and cyanosis and pallor and turgor, " extremity for clubbing.  Findings were normal except for pertinent findings listed below:  M3, good exam    Radiographs (ct chest and cxr) reviewed: view by direct vision  12/2017 good     labs  Results for ARTEM WEI (MRN [id number]) as of 2/21/2018 14:19   Ref. Range 1/10/2018 12:40   WBC Latest Ref Range: 3.90 - 12.70 K/uL 5.40   RBC Latest Ref Range: 4.60 - 6.20 M/uL 3.67 (L)   Hemoglobin Latest Ref Range: 14.0 - 18.0 g/dL 11.2 (L)   Hematocrit Latest Ref Range: 40.0 - 54.0 % 33.1 (L)   MCV Latest Ref Range: 82 - 98 fL 90   MCH Latest Ref Range: 27.0 - 31.0 pg 30.5   MCHC Latest Ref Range: 32.0 - 36.0 g/dL 33.8   RDW Latest Ref Range: 11.5 - 14.5 % 14.5   Platelets Latest Ref Range: 150 - 350 K/uL 159   MPV Latest Ref Range: 9.2 - 12.9 fL 8.3 (L)   Gran% Latest Ref Range: 38.0 - 73.0 % 49.1   Gran # (ANC) Latest Ref Range: 1.8 - 7.7 K/uL 2.6   Lymph% Latest Ref Range: 18.0 - 48.0 % 36.4   Lymph # Latest Ref Range: 1.0 - 4.8 K/uL 1.9   Mono% Latest Ref Range: 4.0 - 15.0 % 6.6   Mono # Latest Ref Range: 0.3 - 1.0 K/uL 0.4   Eosinophil% Latest Ref Range: 0.0 - 8.0 % 7.4   Eos # Latest Ref Range: 0.0 - 0.5 K/uL 0.4   Basophil% Latest Ref Range: 0.0 - 1.9 % 0.5   Baso # Latest Ref Range: 0.00 - 0.20 K/uL 0.00   Iron Latest Ref Range: 45 - 160 ug/dL 64   TIBC Latest Ref Range: 250 - 450 ug/dL 320   Saturated Iron Latest Ref Range: 20 - 50 % 20   Transferrin Latest Ref Range: 200 - 375 mg/dL 216   Ferritin Latest Ref Range: 20.0 - 300.0 ng/mL 119     PFT results reviewed Pulmonary Functions, including spirometry and bronchodilator response and lung volumes and diffusion, study was done 1/10/18.  Spirometry shows loss of vital capacity and fev1 with no obstruction and no bronchodilator response.   FEV1 is 71% or 2.29 liters.  Lung volumes show  normal TLC.  Diffusion shows reduced but falls within normal range when corrected for lung volumes.     Pulmonary functions show low vital capacity but otherwise normal.  MVV is lower than expected and could be from neuromuscular disease?  Clinical correlation recommended.     Mihir Guillen M.D.      Plan:  Clinical impression is apparently straight forward and impression with management as below.    Micheal was seen today for cough and sputum production.    Diagnoses and all orders for this visit:    Chronic sinusitis, unspecified location  -     amoxicillin (AMOXIL) 500 MG capsule; Take 1 capsule (500 mg total) by mouth every 12 (twelve) hours.  -     montelukast (SINGULAIR) 10 mg tablet; Take 1 tablet (10 mg total) by mouth every evening.    Mild persistent asthma without complication  -     budesonide-formoterol 160-4.5 mcg (SYMBICORT) 160-4.5 mcg/actuation HFAA; Inhale 2 puffs into the lungs every 12 (twelve) hours. Controller  -     albuterol 90 mcg/actuation inhaler; 2 puffs every 4 hours as needed for cough, wheeze, or shortness of breath  -     predniSONE (DELTASONE) 20 MG tablet; One daily for 3 days and repeat for flare of lung symptoms as intructed  -     montelukast (SINGULAIR) 10 mg tablet; Take 1 tablet (10 mg total) by mouth every evening.    Other orders  -     fluticasone (FLONASE) 50 mcg/actuation nasal spray; 2 sprays (100 mcg total) by Each Nare route once daily.        Follow-up in about 4 months (around 6/21/2018), or if symptoms worsen or fail to improve.    Discussed with patient above for education the following:      Patient Instructions   Chronic sinus may increase chance of asthma.  Amoxil if infection would be good.  Use flonase 2 each side.    Breathing test is close to normal, could have mild copd, but asthma seems more likely.    Controller or preventive asthma therapy - symbicort 2 twice daily, if no problems might get by 2 in morning?  Would avoid chest pain/buring with acitivity.     May use albuterol inhaler as needed in place of nebulizer.    If albuterol not working or cough/wheeze/sob bad - prednisone should clear up asthma/copd.  Use prednisone  20 mg daily for 3 days.    singulair may better control sinus and asthma- stop if not effective

## 2018-02-21 NOTE — LETTER
February 21, 2018      NANCY Mendez  [address]  [place] [address]           [facility]  [address]  [place] [address]  Phone: [phone]  Fax: [phone]          Patient: Micheal Hunt   MR Number: [id number]   YOB: 1940   Date of Visit: 2/21/2018       Dear Marj Harrell:    Thank you for referring Micheal Hunt to me for evaluation. Attached you will find relevant portions of my assessment and plan of care.    If you have questions, please do not hesitate to call me. I look forward to following Mihceal Hunt along with you.    Sincerely,    Mihir Guillen MD    Enclosure  CC:  No Recipients    If you would like to receive this communication electronically, please contact [email or web address] or [phone] to request more information on [employer] Link access.    For providers and/or their staff who would like to refer a patient to [facility], please contact us through our one-stop-shop provider referral line, [facility], at [phone].    If you feel you have received this communication in error or would no longer like to receive these types of communications, please e-mail [email or web address]

## 2018-02-25 ENCOUNTER — HOSPITAL ENCOUNTER (INPATIENT)
Facility: HOSPITAL | Age: 78
LOS: 2 days | Discharge: HOME | DRG: 392 | End: 2018-02-27
Attending: EMERGENCY MEDICINE | Admitting: HOSPITALIST
Payer: MEDICARE

## 2018-02-25 DIAGNOSIS — R10.9 ABDOMINAL PAIN: ICD-10-CM

## 2018-02-25 DIAGNOSIS — K57.32 SIGMOID DIVERTICULITIS: Primary | ICD-10-CM

## 2018-02-25 DIAGNOSIS — R07.9 CHEST PAIN: ICD-10-CM

## 2018-02-25 PROBLEM — D63.1 ANEMIA DUE TO STAGE 4 CHRONIC KIDNEY DISEASE: Status: ACTIVE | Noted: 2018-02-25

## 2018-02-25 PROBLEM — D69.6 THROMBOCYTOPENIA: Status: ACTIVE | Noted: 2018-02-25

## 2018-02-25 PROBLEM — N28.1 RENAL CYST: Status: ACTIVE | Noted: 2018-02-25

## 2018-02-25 PROBLEM — K40.90 UNILATERAL INGUINAL HERNIA: Status: ACTIVE | Noted: 2018-02-25

## 2018-02-25 PROBLEM — N18.4 ANEMIA DUE TO STAGE 4 CHRONIC KIDNEY DISEASE: Status: ACTIVE | Noted: 2018-02-25

## 2018-02-25 LAB
ALBUMIN SERPL BCP-MCNC: 3.2 G/DL
ALP SERPL-CCNC: 86 U/L
ALT SERPL W/O P-5'-P-CCNC: 13 U/L
ANION GAP SERPL CALC-SCNC: 10 MMOL/L
AST SERPL-CCNC: 13 U/L
BACTERIA #/AREA URNS HPF: ABNORMAL /HPF
BASOPHILS # BLD AUTO: 0 K/UL
BASOPHILS NFR BLD: 0.3 %
BILIRUB SERPL-MCNC: 0.4 MG/DL
BILIRUB UR QL STRIP: NEGATIVE
BUN SERPL-MCNC: 40 MG/DL
CALCIUM SERPL-MCNC: 8.8 MG/DL
CHLORIDE SERPL-SCNC: 103 MMOL/L
CLARITY UR: CLEAR
CO2 SERPL-SCNC: 25 MMOL/L
COLOR UR: YELLOW
CREAT SERPL-MCNC: 4.7 MG/DL
DIFFERENTIAL METHOD: ABNORMAL
EOSINOPHIL # BLD AUTO: 0.1 K/UL
EOSINOPHIL NFR BLD: 2.1 %
ERYTHROCYTE [DISTWIDTH] IN BLOOD BY AUTOMATED COUNT: 14.3 %
EST. GFR  (AFRICAN AMERICAN): 13 ML/MIN/1.73 M^2
EST. GFR  (NON AFRICAN AMERICAN): 11 ML/MIN/1.73 M^2
GLUCOSE SERPL-MCNC: 132 MG/DL
GLUCOSE UR QL STRIP: NEGATIVE
HCT VFR BLD AUTO: 30.1 %
HGB BLD-MCNC: 9.9 G/DL
HGB UR QL STRIP: ABNORMAL
HYALINE CASTS #/AREA URNS LPF: 0 /LPF
KETONES UR QL STRIP: NEGATIVE
LACTATE SERPL-SCNC: 0.7 MMOL/L
LEUKOCYTE ESTERASE UR QL STRIP: NEGATIVE
LIPASE SERPL-CCNC: 62 U/L
LYMPHOCYTES # BLD AUTO: 1.4 K/UL
LYMPHOCYTES NFR BLD: 20.8 %
MCH RBC QN AUTO: 29.9 PG
MCHC RBC AUTO-ENTMCNC: 33 G/DL
MCV RBC AUTO: 90 FL
MICROSCOPIC COMMENT: ABNORMAL
MONOCYTES # BLD AUTO: 0.4 K/UL
MONOCYTES NFR BLD: 6.4 %
NEUTROPHILS # BLD AUTO: 4.8 K/UL
NEUTROPHILS NFR BLD: 70.4 %
NITRITE UR QL STRIP: NEGATIVE
PH UR STRIP: 6 [PH] (ref 5–8)
PLATELET # BLD AUTO: 123 K/UL
PMV BLD AUTO: 8.7 FL
POTASSIUM SERPL-SCNC: 3.8 MMOL/L
PROT SERPL-MCNC: 6.6 G/DL
PROT UR QL STRIP: ABNORMAL
RBC # BLD AUTO: 3.33 M/UL
RBC #/AREA URNS HPF: 2 /HPF (ref 0–4)
SODIUM SERPL-SCNC: 138 MMOL/L
SP GR UR STRIP: 1.02 (ref 1–1.03)
SQUAMOUS #/AREA URNS HPF: 1 /HPF
URN SPEC COLLECT METH UR: ABNORMAL
UROBILINOGEN UR STRIP-ACNC: NEGATIVE EU/DL
WBC # BLD AUTO: 6.9 K/UL
WBC #/AREA URNS HPF: 3 /HPF (ref 0–5)

## 2018-02-25 PROCEDURE — 80053 COMPREHEN METABOLIC PANEL: CPT

## 2018-02-25 PROCEDURE — 63600175 PHARM REV CODE 636 W HCPCS: Performed by: INTERNAL MEDICINE

## 2018-02-25 PROCEDURE — 96361 HYDRATE IV INFUSION ADD-ON: CPT

## 2018-02-25 PROCEDURE — 63600175 PHARM REV CODE 636 W HCPCS: Performed by: PHYSICIAN ASSISTANT

## 2018-02-25 PROCEDURE — 63600175 PHARM REV CODE 636 W HCPCS: Performed by: HOSPITALIST

## 2018-02-25 PROCEDURE — 99285 EMERGENCY DEPT VISIT HI MDM: CPT | Mod: 25

## 2018-02-25 PROCEDURE — 99900035 HC TECH TIME PER 15 MIN (STAT)

## 2018-02-25 PROCEDURE — 94761 N-INVAS EAR/PLS OXIMETRY MLT: CPT

## 2018-02-25 PROCEDURE — 36415 COLL VENOUS BLD VENIPUNCTURE: CPT

## 2018-02-25 PROCEDURE — 63600175 PHARM REV CODE 636 W HCPCS: Performed by: EMERGENCY MEDICINE

## 2018-02-25 PROCEDURE — 25000003 PHARM REV CODE 250: Performed by: HOSPITALIST

## 2018-02-25 PROCEDURE — S0030 INJECTION, METRONIDAZOLE: HCPCS | Performed by: HOSPITALIST

## 2018-02-25 PROCEDURE — 25000003 PHARM REV CODE 250: Performed by: PHYSICIAN ASSISTANT

## 2018-02-25 PROCEDURE — 25000003 PHARM REV CODE 250: Performed by: EMERGENCY MEDICINE

## 2018-02-25 PROCEDURE — 96375 TX/PRO/DX INJ NEW DRUG ADDON: CPT

## 2018-02-25 PROCEDURE — 25000242 PHARM REV CODE 250 ALT 637 W/ HCPCS: Performed by: HOSPITALIST

## 2018-02-25 PROCEDURE — 96374 THER/PROPH/DIAG INJ IV PUSH: CPT

## 2018-02-25 PROCEDURE — 25000003 PHARM REV CODE 250: Performed by: NURSE PRACTITIONER

## 2018-02-25 PROCEDURE — 83605 ASSAY OF LACTIC ACID: CPT

## 2018-02-25 PROCEDURE — 83690 ASSAY OF LIPASE: CPT

## 2018-02-25 PROCEDURE — 81000 URINALYSIS NONAUTO W/SCOPE: CPT

## 2018-02-25 PROCEDURE — 85025 COMPLETE CBC W/AUTO DIFF WBC: CPT

## 2018-02-25 PROCEDURE — 12000002 HC ACUTE/MED SURGE SEMI-PRIVATE ROOM

## 2018-02-25 RX ORDER — FLUTICASONE FUROATE AND VILANTEROL 100; 25 UG/1; UG/1
1 POWDER RESPIRATORY (INHALATION) DAILY
Status: DISCONTINUED | OUTPATIENT
Start: 2018-02-25 | End: 2018-02-25

## 2018-02-25 RX ORDER — CALCITRIOL 0.25 UG/1
0.25 CAPSULE ORAL DAILY
Status: DISCONTINUED | OUTPATIENT
Start: 2018-02-25 | End: 2018-02-27 | Stop reason: HOSPADM

## 2018-02-25 RX ORDER — IPRATROPIUM BROMIDE AND ALBUTEROL SULFATE 2.5; .5 MG/3ML; MG/3ML
3 SOLUTION RESPIRATORY (INHALATION) EVERY 6 HOURS PRN
Status: DISCONTINUED | OUTPATIENT
Start: 2018-02-25 | End: 2018-02-25

## 2018-02-25 RX ORDER — CARVEDILOL 6.25 MG/1
6.25 TABLET ORAL 2 TIMES DAILY WITH MEALS
Status: DISCONTINUED | OUTPATIENT
Start: 2018-02-25 | End: 2018-02-26

## 2018-02-25 RX ORDER — SIMETHICONE 80 MG
2 TABLET,CHEWABLE ORAL 3 TIMES DAILY PRN
Status: DISCONTINUED | OUTPATIENT
Start: 2018-02-25 | End: 2018-02-27 | Stop reason: HOSPADM

## 2018-02-25 RX ORDER — FENTANYL CITRATE 50 UG/ML
25 INJECTION, SOLUTION INTRAMUSCULAR; INTRAVENOUS
Status: COMPLETED | OUTPATIENT
Start: 2018-02-25 | End: 2018-02-25

## 2018-02-25 RX ORDER — FERROUS SULFATE 325(65) MG
325 TABLET, DELAYED RELEASE (ENTERIC COATED) ORAL 2 TIMES DAILY WITH MEALS
Status: DISCONTINUED | OUTPATIENT
Start: 2018-02-28 | End: 2018-02-27 | Stop reason: HOSPADM

## 2018-02-25 RX ORDER — SODIUM CHLORIDE 9 MG/ML
INJECTION, SOLUTION INTRAVENOUS CONTINUOUS
Status: DISCONTINUED | OUTPATIENT
Start: 2018-02-25 | End: 2018-02-27 | Stop reason: HOSPADM

## 2018-02-25 RX ORDER — RAMELTEON 8 MG/1
8 TABLET ORAL NIGHTLY PRN
Status: DISCONTINUED | OUTPATIENT
Start: 2018-02-25 | End: 2018-02-27 | Stop reason: HOSPADM

## 2018-02-25 RX ORDER — ASPIRIN 81 MG/1
81 TABLET ORAL DAILY
Status: DISCONTINUED | OUTPATIENT
Start: 2018-02-26 | End: 2018-02-27 | Stop reason: HOSPADM

## 2018-02-25 RX ORDER — CIPROFLOXACIN 2 MG/ML
400 INJECTION, SOLUTION INTRAVENOUS
Status: DISCONTINUED | OUTPATIENT
Start: 2018-02-25 | End: 2018-02-25

## 2018-02-25 RX ORDER — FLUTICASONE PROPIONATE 50 MCG
2 SPRAY, SUSPENSION (ML) NASAL DAILY
Status: DISCONTINUED | OUTPATIENT
Start: 2018-02-25 | End: 2018-02-27 | Stop reason: HOSPADM

## 2018-02-25 RX ORDER — DICLOFENAC SODIUM 10 MG/G
2 GEL TOPICAL 2 TIMES DAILY
Status: DISCONTINUED | OUTPATIENT
Start: 2018-02-25 | End: 2018-02-27 | Stop reason: HOSPADM

## 2018-02-25 RX ORDER — ASCORBIC ACID 500 MG
500 TABLET ORAL NIGHTLY
Status: DISCONTINUED | OUTPATIENT
Start: 2018-02-28 | End: 2018-02-27 | Stop reason: HOSPADM

## 2018-02-25 RX ORDER — ONDANSETRON 4 MG/1
4 TABLET, ORALLY DISINTEGRATING ORAL
Status: COMPLETED | OUTPATIENT
Start: 2018-02-25 | End: 2018-02-25

## 2018-02-25 RX ORDER — FINASTERIDE 5 MG/1
5 TABLET, FILM COATED ORAL DAILY
Status: DISCONTINUED | OUTPATIENT
Start: 2018-02-25 | End: 2018-02-27 | Stop reason: HOSPADM

## 2018-02-25 RX ORDER — MONTELUKAST SODIUM 10 MG/1
10 TABLET ORAL NIGHTLY
Status: DISCONTINUED | OUTPATIENT
Start: 2018-02-25 | End: 2018-02-27 | Stop reason: HOSPADM

## 2018-02-25 RX ORDER — FLUTICASONE FUROATE AND VILANTEROL 100; 25 UG/1; UG/1
1 POWDER RESPIRATORY (INHALATION) DAILY
Status: DISCONTINUED | OUTPATIENT
Start: 2018-02-26 | End: 2018-02-27 | Stop reason: HOSPADM

## 2018-02-25 RX ORDER — ATORVASTATIN CALCIUM 40 MG/1
80 TABLET, FILM COATED ORAL DAILY
Status: DISCONTINUED | OUTPATIENT
Start: 2018-02-26 | End: 2018-02-27 | Stop reason: HOSPADM

## 2018-02-25 RX ORDER — OXYCODONE HYDROCHLORIDE 10 MG/1
10 TABLET ORAL EVERY 6 HOURS PRN
Status: DISCONTINUED | OUTPATIENT
Start: 2018-02-25 | End: 2018-02-27 | Stop reason: HOSPADM

## 2018-02-25 RX ORDER — CIPROFLOXACIN 2 MG/ML
400 INJECTION, SOLUTION INTRAVENOUS
Status: DISCONTINUED | OUTPATIENT
Start: 2018-02-25 | End: 2018-02-27 | Stop reason: HOSPADM

## 2018-02-25 RX ORDER — GLUCAGON 1 MG
1 KIT INJECTION
Status: DISCONTINUED | OUTPATIENT
Start: 2018-02-25 | End: 2018-02-27 | Stop reason: HOSPADM

## 2018-02-25 RX ORDER — ACETAMINOPHEN 325 MG/1
650 TABLET ORAL EVERY 8 HOURS PRN
Status: DISCONTINUED | OUTPATIENT
Start: 2018-02-25 | End: 2018-02-27 | Stop reason: HOSPADM

## 2018-02-25 RX ORDER — IBUPROFEN 200 MG
16 TABLET ORAL
Status: DISCONTINUED | OUTPATIENT
Start: 2018-02-25 | End: 2018-02-27 | Stop reason: HOSPADM

## 2018-02-25 RX ORDER — HYDROMORPHONE HYDROCHLORIDE 2 MG/ML
0.5 INJECTION, SOLUTION INTRAMUSCULAR; INTRAVENOUS; SUBCUTANEOUS EVERY 4 HOURS PRN
Status: DISCONTINUED | OUTPATIENT
Start: 2018-02-25 | End: 2018-02-27 | Stop reason: HOSPADM

## 2018-02-25 RX ORDER — PANTOPRAZOLE SODIUM 40 MG/1
40 TABLET, DELAYED RELEASE ORAL DAILY
Status: DISCONTINUED | OUTPATIENT
Start: 2018-02-26 | End: 2018-02-27 | Stop reason: HOSPADM

## 2018-02-25 RX ORDER — IPRATROPIUM BROMIDE AND ALBUTEROL SULFATE 2.5; .5 MG/3ML; MG/3ML
3 SOLUTION RESPIRATORY (INHALATION) EVERY 6 HOURS PRN
Status: DISCONTINUED | OUTPATIENT
Start: 2018-02-25 | End: 2018-02-27 | Stop reason: HOSPADM

## 2018-02-25 RX ORDER — IBUPROFEN 200 MG
24 TABLET ORAL
Status: DISCONTINUED | OUTPATIENT
Start: 2018-02-25 | End: 2018-02-27 | Stop reason: HOSPADM

## 2018-02-25 RX ORDER — FAMOTIDINE 20 MG/1
20 TABLET, FILM COATED ORAL 2 TIMES DAILY PRN
Status: DISCONTINUED | OUTPATIENT
Start: 2018-02-25 | End: 2018-02-27 | Stop reason: HOSPADM

## 2018-02-25 RX ORDER — TERAZOSIN 5 MG/1
10 CAPSULE ORAL NIGHTLY
Status: DISCONTINUED | OUTPATIENT
Start: 2018-02-25 | End: 2018-02-27 | Stop reason: HOSPADM

## 2018-02-25 RX ORDER — ACETAMINOPHEN 325 MG/1
650 TABLET ORAL EVERY 4 HOURS PRN
Status: DISCONTINUED | OUTPATIENT
Start: 2018-02-25 | End: 2018-02-27 | Stop reason: HOSPADM

## 2018-02-25 RX ORDER — ONDANSETRON 2 MG/ML
4 INJECTION INTRAMUSCULAR; INTRAVENOUS EVERY 8 HOURS PRN
Status: DISCONTINUED | OUTPATIENT
Start: 2018-02-25 | End: 2018-02-27 | Stop reason: HOSPADM

## 2018-02-25 RX ORDER — METRONIDAZOLE 500 MG/100ML
500 INJECTION, SOLUTION INTRAVENOUS
Status: DISCONTINUED | OUTPATIENT
Start: 2018-02-25 | End: 2018-02-27 | Stop reason: HOSPADM

## 2018-02-25 RX ADMIN — OXYCODONE HYDROCHLORIDE 10 MG: 10 TABLET ORAL at 02:02

## 2018-02-25 RX ADMIN — RAMELTEON 8 MG: 8 TABLET, FILM COATED ORAL at 10:02

## 2018-02-25 RX ADMIN — MONTELUKAST SODIUM 10 MG: 10 TABLET, FILM COATED ORAL at 08:02

## 2018-02-25 RX ADMIN — ERTAPENEM SODIUM 1 G: 1 INJECTION, POWDER, LYOPHILIZED, FOR SOLUTION INTRAMUSCULAR; INTRAVENOUS at 01:02

## 2018-02-25 RX ADMIN — CARVEDILOL 6.25 MG: 6.25 TABLET, FILM COATED ORAL at 04:02

## 2018-02-25 RX ADMIN — CIPROFLOXACIN 400 MG: 2 INJECTION, SOLUTION INTRAVENOUS at 02:02

## 2018-02-25 RX ADMIN — SODIUM CHLORIDE: 0.9 INJECTION, SOLUTION INTRAVENOUS at 02:02

## 2018-02-25 RX ADMIN — SIMETHICONE CHEW TAB 80 MG 160 MG: 80 TABLET ORAL at 10:02

## 2018-02-25 RX ADMIN — FLUTICASONE PROPIONATE 100 MCG: 50 SPRAY, METERED NASAL at 04:02

## 2018-02-25 RX ADMIN — TERAZOSIN HYDROCHLORIDE 10 MG: 5 CAPSULE ORAL at 08:02

## 2018-02-25 RX ADMIN — SODIUM CHLORIDE 250 ML: 0.9 INJECTION, SOLUTION INTRAVENOUS at 12:02

## 2018-02-25 RX ADMIN — FINASTERIDE 5 MG: 5 TABLET, FILM COATED ORAL at 02:02

## 2018-02-25 RX ADMIN — ONDANSETRON 4 MG: 4 TABLET, ORALLY DISINTEGRATING ORAL at 01:02

## 2018-02-25 RX ADMIN — CIPROFLOXACIN 400 MG: 2 INJECTION, SOLUTION INTRAVENOUS at 03:02

## 2018-02-25 RX ADMIN — FENTANYL CITRATE 25 MCG: 50 INJECTION, SOLUTION INTRAMUSCULAR; INTRAVENOUS at 12:02

## 2018-02-25 RX ADMIN — CALCITRIOL 0.25 MCG: 0.25 CAPSULE, LIQUID FILLED ORAL at 02:02

## 2018-02-25 RX ADMIN — METRONIDAZOLE 500 MG: 500 INJECTION, SOLUTION INTRAVENOUS at 04:02

## 2018-02-25 NOTE — UM SECONDARY REVIEW
Physician Advisor External    Level of Care Issue    Approved Inpatient for admit 2/25/18 per ehr md review, dr salazar.

## 2018-02-25 NOTE — ED PROVIDER NOTES
Encounter Date: 2/25/2018    SCRIBE #1 NOTE: Aimee RODRIGUEZ, molina scribing for, and in the presence of, Reyna Prasad PA-C .       History     Chief Complaint   Patient presents with    Back Pain     bilat flank pain & bandlike lower abd vs. suprapubic pain - atraumatic (s/s x 3 days) - reports Hx of kidney problems    Flank Pain       02/25/2018 12:07 PM     Chief complaint: Abd pain and flank pain       Micheal Hunt is a 77 y.o. male with a hx of Kidney disease, CAD, HTN, LVH, and GERD who presents to the ED with complaints of 10/10 suprapubic abd pain associated with bilateral flank pain and abd bloating x 3 days. He has not taken any pain medication. Pt denies nausea, vomiting, trouble urinating, CP , SOB and fever. Allergens include Ace inhibitors, Arb- angiotensin and Eplerenone. No attempted treatment. No prior abdominal surgeries.       The history is provided by the patient.     Review of patient's allergies indicates:   Allergen Reactions    Ace inhibitors Other (See Comments)     Cough    Arb-angiotensin receptor antagonist Itching    Eplerenone Other (See Comments)     Marked bradycardia, 40, tiredness and weakness       Past Medical History:   Diagnosis Date    Allergy     Arthritis     Gout    BPH (benign prostatic hyperplasia)     CAD (coronary artery disease) 2006    Chronic kidney disease     due to ibuprofen    Colon polyp     Diverticulosis     GERD (gastroesophageal reflux disease)     HTN (hypertension) 3/27/2012    Hyperlipidemia     LVH (left ventricular hypertrophy)     Murmur, cardiac 3/27/2012    GRICELDA (obstructive sleep apnea)     DOES NOT USE A MACHINE    Sinus problem     Syncope and collapse      Past Surgical History:   Procedure Laterality Date    APPENDECTOMY      CARDIAC CATHETERIZATION      COLONOSCOPY  2011    CORONARY ARTERY BYPASS GRAFT  4/2007    x 1    JOINT REPLACEMENT      left knee total replacement  X 3    mid leftt finger      from a  "cactuss    MOLE REMOVAL  2016    rotative cuff      no rotative cuffs on bilat shoulders has pins     SHOULDER SURGERY      shoulder surgery bilat  RIGHT X 4; LEFT X 3     Family History   Problem Relation Age of Onset    Heart disease Mother     Sudden death Father     Stroke Sister     Heart disease Sister     Kidney cancer Neg Hx     Prostate cancer Neg Hx     Urolithiasis Neg Hx     Allergic rhinitis Neg Hx     Allergies Neg Hx     Angioedema Neg Hx     Asthma Neg Hx     Atopy Neg Hx     Eczema Neg Hx     Immunodeficiency Neg Hx     Rhinitis Neg Hx     Urticaria Neg Hx      Social History   Substance Use Topics    Smoking status: Never Smoker    Smokeless tobacco: Never Used    Alcohol use Yes      Comment: rare     Review of Systems   Constitutional: Negative for activity change, appetite change, chills and fever.   HENT: Negative for congestion, rhinorrhea and sore throat.    Eyes: Negative for redness and visual disturbance.   Respiratory: Negative for cough, chest tightness and shortness of breath.    Cardiovascular: Negative for chest pain.   Gastrointestinal: Positive for abdominal pain. Negative for diarrhea, nausea and vomiting.        + for "abd bloating"   Genitourinary: Positive for flank pain. Negative for difficulty urinating, dysuria and frequency.   Musculoskeletal: Positive for back pain. Negative for neck pain and neck stiffness.   Skin: Negative for rash.   Neurological: Negative for dizziness, syncope, numbness and headaches.   All other systems reviewed and are negative.      Physical Exam     Initial Vitals [02/25/18 1110]   BP Pulse Resp Temp SpO2   93/64 64 18 97.9 °F (36.6 °C) 96 %      MAP       73.67         Physical Exam    Nursing note and vitals reviewed.  Constitutional: Vital signs are normal. He appears well-developed and well-nourished. He is not diaphoretic. He is cooperative.  Non-toxic appearance. He does not have a sickly appearance. No distress.   HENT: "   Head: Normocephalic and atraumatic.   Right Ear: External ear normal.   Left Ear: External ear normal.   Nose: Nose normal.   Mouth/Throat: Oropharynx is clear and moist.   Eyes: Conjunctivae and lids are normal. Pupils are equal, round, and reactive to light.   Neck: Normal range of motion and full passive range of motion without pain. Neck supple.   Cardiovascular: Normal rate and regular rhythm.   Murmur heard.  Pulmonary/Chest: Breath sounds normal. He has no wheezes. He has no rales.   Abdominal: Soft. Normal appearance. He exhibits no distension. There is generalized tenderness. There is no rigidity, no rebound and no guarding.   Neurological: He is alert and oriented to person, place, and time.   Skin: Skin is warm, dry and intact. No rash noted.         ED Course   Procedures  Labs Reviewed   CBC W/ AUTO DIFFERENTIAL - Abnormal; Notable for the following:        Result Value    RBC 3.33 (*)     Hemoglobin 9.9 (*)     Hematocrit 30.1 (*)     Platelets 123 (*)     MPV 8.7 (*)     All other components within normal limits   COMPREHENSIVE METABOLIC PANEL - Abnormal; Notable for the following:     Glucose 132 (*)     BUN, Bld 40 (*)     Creatinine 4.7 (*)     Albumin 3.2 (*)     eGFR if  13 (*)     eGFR if non  11 (*)     All other components within normal limits   URINALYSIS - Abnormal; Notable for the following:     Protein, UA 3+ (*)     Occult Blood UA Trace (*)     All other components within normal limits   LIPASE - Abnormal; Notable for the following:     Lipase 62 (*)     All other components within normal limits   URINALYSIS MICROSCOPIC - Abnormal; Notable for the following:     Bacteria, UA Few (*)     All other components within normal limits   LACTIC ACID, PLASMA             Medical Decision Making:   History:   I obtained history from: someone other than patient.  Old Medical Records: I decided to obtain old medical records.  Clinical Tests:   Lab Tests: Ordered and  Reviewed  Radiological Study: Ordered and Reviewed  Other:   I have discussed this case with another health care provider.       APC / Resident Notes:   Urgent evaluation of a 77 year old male who presents with abdominal pain. He denied any associated symptoms. Vital signs are stable. He has significant tenderness to palpation to the abdomen with no guarding. He has no CVA tenderness. UA is negative. He has no leukocytosis. He has CKD. CT abdomen showed diverticulitis. He was started on IV antibiotics and will be admitted to hospital medicine. Patient was evaluated by Dr. Hendrickson who has evaluated the patient and is in agreement with the plan of care.       Scribe Attestation:   Scribe #1: I performed the above scribed service and the documentation accurately describes the services I performed. I attest to the accuracy of the note.    Attending Attestation:     Physician Attestation Statement for NP/PA:   I have conducted a face to face encounter with this patient in addition to the NP/PA, due to Medical Complexity    Other NP/PA Attestation Additions:      Medical Decision Making: I provided a face to face evaluation of this patient.  I discussed the patient's care with Advanced Practice Clinician.  I reviewed their note and agree with the history, physical, assessment, diagnosis, treatment, and plan provided by the Advanced Practice Clinician. My overall impression is ticulitis.           I, Reyna Prasad PA-C, personally performed the services described in this documentation. All medical record entries made by the scribe were at my direction and in my presence.  I have reviewed the chart and agree that the record reflects my personal performance and is accurate and complete. Reyna Parsad PA-C.  6:49 PM 02/25/2018          ED Course as of Feb 25 1853   Sun Feb 25, 2018   1223 77-year-old presents to the emergency room several days abdominal pain.  On exam he is diffusely tender.  Bedside ultrasound  demonstrates no urinary retention or bladder distention.  Differential is broad and includes bowel obstruction perforation diverticulitis.  Patient has stage IV kidney disease, CT will be performed without contrast.  Patient will be given a small amount of fentanyl and IV fluids.  Lactic acid portable chest x-ray added on.  [EF]   [facility] diverticulitis, no sepsis crtieria  [EF]   1317 Creatinine: (!) 4.7 [EF]      ED Course User Index  [EF] Collin Hendrickson MD     Clinical Impression:     1. Sigmoid diverticulitis    2. Abdominal pain                               Reyna Prasad PA-C  02/25/18 1854       Collin Hendrickson MD  02/25/18 0085

## 2018-02-25 NOTE — PROGRESS NOTES
Subjective:       Patient ID: Micheal Hunt is a 77 y.o. male.    Chief Complaint: Hyperlipidemia and Hypertension    He has CKD 4.  Chemistry           Component                Value               Date/Time                  NA                       138                 02/25/2018 1202            K                        3.8                 02/25/2018 1202            CL                       103                 02/25/2018 1202            CO2                      25                  02/25/2018 1202            BUN                      40 (H)              02/25/2018 1202            CREATININE               4.7 (H)             02/25/2018 1202            CREATININE               1.5 (H)             08/08/2013 0909            GLU                      132 (H)             02/25/2018 1202              Component                Value               Date/Time                  CALCIUM                  8.8                 02/25/2018 1202            CALCIUM                  9.0                 08/08/2013 0909            ALKPHOS                  86                  02/25/2018 1202            AST                      13                  02/25/2018 1202            ALT                      13                  02/25/2018 1202            BILITOT                  0.4                 02/25/2018 1202            ESTGFRAFRICA             13 (A)              02/25/2018 1202            EGFRNONAA                11 (A)              02/25/2018 1202       Under hemodialysis with chronic metabolic acidoses, .chronic anemia, and chronic obstructive pulmonary disease.      Hyperlipidemia   This is a recurrent problem. The problem is controlled. Exacerbating diseases include chronic renal disease. There are no known factors aggravating his hyperlipidemia. Associated symptoms include shortness of breath. Pertinent negatives include no chest pain.   Hypertension   This is a chronic problem. The current episode started more than 1 year ago. The  problem is unchanged. The problem is resistant. Associated symptoms include malaise/fatigue, peripheral edema and shortness of breath. Pertinent negatives include no chest pain, headaches, neck pain or palpitations. There are no associated agents to hypertension. Identifiable causes of hypertension include chronic renal disease.     Review of Systems   Constitutional: Positive for fatigue, malaise/fatigue and unexpected weight change.   Respiratory: Positive for shortness of breath. Negative for chest tightness.    Cardiovascular: Negative for chest pain, palpitations and leg swelling.   Gastrointestinal: Negative for abdominal pain.   Genitourinary: Positive for urgency.   Musculoskeletal: Positive for back pain and gait problem. Negative for arthralgias and neck pain.   Neurological: Positive for weakness. Negative for dizziness, syncope, light-headedness and headaches.   Psychiatric/Behavioral: Positive for confusion and decreased concentration.       Patient Active Problem List   Diagnosis    Osteoarthritis of right knee, L-TKR 10/2012    Nocturia    Hematuria    BPH (benign prostatic hyperplasia)    Carotid stenosis    HTN (hypertension),     Hyperlipidemia, baseline     CAD (coronary artery disease), 2V CABG 2007    Gout, arthritis    Obesity, Class I, BMI 32.8 to 34.3, 32.7, today 32.6    Vertigo    LVH (left ventricular hypertrophy) due to hypertensive disease    Abnormal cardiovascular stress test    CKD (chronic kidney disease), stage III, eGFR 40, 7/2014, stage 4, eGFR 12, 1/2018    GERD (gastroesophageal reflux disease)    Elevated PSA    Sleep disorder    Acquired hammer toe    Diastolic dysfunction    Abdominal obesity    Back pain, chronic    Glucose intolerance (impaired glucose tolerance)    Anemia, mild    Gastric nodule    Diverticulitis, 2005, 2015    Personal history of colonic polyps    PSA elevation    DDD (degenerative disc disease), lumbar    LV  dysfunction, EF 50%, reduced to 34%    Other spondylosis, lumbar region    Knee pain    NICK (acute kidney injury)    BPH with obstruction/lower urinary tract symptoms    Itching, both legs, onset 7/2017    Chronic sinusitis    Mild persistent asthma without complication    Sigmoid diverticulitis    Anemia due to stage 4 chronic kidney disease       Objective:      Physical Exam   Constitutional: He is oriented to person, place, and time. He appears well-developed and well-nourished.   Cardiovascular: Normal rate, regular rhythm and normal heart sounds.    Pulmonary/Chest: Effort normal and breath sounds normal.   Musculoskeletal: He exhibits no edema.   Neurological: He is alert and oriented to person, place, and time.   Skin: Skin is warm and dry.   Psychiatric: He has a normal mood and affect.   Nursing note and vitals reviewed.      Lab Results   Component Value Date    WBC 6.90 02/25/2018    HGB 9.9 (L) 02/25/2018    HCT 30.1 (L) 02/25/2018     (L) 02/25/2018    CHOL 116 (L) 03/10/2017    TRIG 121 03/10/2017    HDL 29 (L) 03/10/2017    ALT 13 02/25/2018    AST 13 02/25/2018     02/25/2018    K 3.8 02/25/2018     02/25/2018    CREATININE 4.7 (H) 02/25/2018    BUN 40 (H) 02/25/2018    CO2 25 02/25/2018    TSH 0.787 04/26/2017    PSA 4.01 (H) 06/03/2013    INR 0.97 05/09/2012    HGBA1C 6.2 02/13/2015     The ASCVD Risk score ([place] ANTONIO Jr., et al., 2013) failed to calculate for the following reasons:    The valid total cholesterol range is 130 to 320 mg/dL    Assessment:       1. Essential hypertension    2. Hyperuricemia    3. Benign prostatic hyperplasia with urinary obstruction    4. Hypertensive left ventricular hypertrophy, without heart failure        Plan:       Essential hypertension  -     URINALYSIS; Future; Expected date: 02/15/2018  -     valsartan (DIOVAN) 160 MG tablet; Take 1 tablet (160 mg total) by mouth once daily.  Dispense: 90 tablet; Refill: 3    Hyperuricemia  -      URINALYSIS; Future; Expected date: 02/15/2018  -     Uric acid; Future; Expected date: 02/15/2018    Benign prostatic hyperplasia with urinary obstruction    Hypertensive left ventricular hypertrophy, without heart failure      Patient readiness: nonacceptance and barriers:social stressors, household issues and poor sleep habits    During the course of the visit the patient was educated and counseled about the following:     Hypertension:   Dietary sodium restriction.  Check blood pressures daily and record.  low potassium diet.    Goals: Hypertension: Reduce Blood Pressure    Did patient meet goals/outcomes: No    The following self management tools provided: blood pressure log    Patient Instructions (the written plan) was given to the patient/family.     Time spent with patient: 45 minutes    Barriers to medications present (yes )    Adverse reactions to current medications (yes)    Over the counter medications reviewed (Yes)        40-minute visit. 20 minutes spent counseling patient on diet, exercise, and weight loss.  This has been fully explained to the patient, who indicates understanding.

## 2018-02-25 NOTE — CONSULTS
Nephrology Consult Note        Patient Name: Micheal Hunt  MRN: [id number]    Patient Class: IP- Inpatient   Admission Date: 2/25/2018  Length of Stay: 0 days  Date of Service: 2/25/2018    Attending Physician: Nandini Herndon MD  Primary Care Provider: Pk Lakhani MD    Reason for Consult: CKD4, anemia, HTN, secondary HPT    SUBJECTIVE:     HPI: 77M with progressive non-diabetic CKD stage 4 (sCr a year ago was 2.5 = GFR in the 20's, more recently around 3-4 = eGFR in 10's) followed by Dr. Rojo, has been complaining of abdominal and back pain with abdominal distention, gas and burping for few days. He called me yesterday and since his symptoms did not improve, I sent him to ED, where he was diagnosed with uncomplicated diverticulitis on non-contrast CT. Unclear if he has NICK, renal is consulted for do-management.    Past Medical History:   Diagnosis Date    Allergy     Arthritis     Gout    BPH (benign prostatic hyperplasia)     CAD (coronary artery disease) 2006    Chronic kidney disease     due to ibuprofen    Colon polyp     Diverticulosis     GERD (gastroesophageal reflux disease)     HTN (hypertension) 3/27/2012    Hyperlipidemia     LVH (left ventricular hypertrophy)     Murmur, cardiac 3/27/2012    GRICELDA (obstructive sleep apnea)     DOES NOT USE A MACHINE    Sinus problem     Syncope and collapse      Past Surgical History:   Procedure Laterality Date    APPENDECTOMY      CARDIAC CATHETERIZATION      COLONOSCOPY  2011    CORONARY ARTERY BYPASS GRAFT  4/2007    x 1    JOINT REPLACEMENT      left knee total replacement  X 3    mid leftt finger      from a cactuss    MOLE REMOVAL  2016    rotative cuff      no rotative cuffs on bilat shoulders has pins     SHOULDER SURGERY      shoulder surgery bilat  RIGHT X 4; LEFT X 3     Family History   Problem Relation Age of Onset    Heart disease Mother     Sudden death Father     Stroke Sister     Heart disease Sister     Kidney  cancer Neg Hx     Prostate cancer Neg Hx     Urolithiasis Neg Hx     Allergic rhinitis Neg Hx     Allergies Neg Hx     Angioedema Neg Hx     Asthma Neg Hx     Atopy Neg Hx     Eczema Neg Hx     Immunodeficiency Neg Hx     Rhinitis Neg Hx     Urticaria Neg Hx      Social History   Substance Use Topics    Smoking status: Never Smoker    Smokeless tobacco: Never Used    Alcohol use Yes      Comment: rare       Review of patient's allergies indicates:   Allergen Reactions    Ace inhibitors Other (See Comments)     Cough    Arb-angiotensin receptor antagonist Itching    Eplerenone Other (See Comments)     Marked bradycardia, 40, tiredness and weakness         Outpatient meds:  No current facility-administered medications on file prior to encounter.      Current Outpatient Prescriptions on File Prior to Encounter   Medication Sig Dispense Refill    albuterol 90 mcg/actuation inhaler 2 puffs every 4 hours as needed for cough, wheeze, or shortness of breath 3 Inhaler 3    albuterol-ipratropium 2.5mg-0.5mg/3mL (DUO-NEB) 0.5 mg-3 mg(2.5 mg base)/3 mL nebulizer solution INHALE 1 VIAL BY NEBULIZER EVERY 6 HOURS AS NEEDED FOR WHEEZING 270 mL 0    allopurinol (ZYLOPRIM) 300 MG tablet Take 1 tablet (300 mg total) by mouth once daily. 90 tablet 3    amlodipine (NORVASC) 10 MG tablet Take 1 tablet (10 mg total) by mouth before dinner. 90 tablet 3    amoxicillin (AMOXIL) 500 MG capsule Take 1 capsule (500 mg total) by mouth every 12 (twelve) hours. 28 capsule 3    aspirin (ECOTRIN) 81 MG EC tablet Take 81 mg by mouth once daily.        atorvastatin (LIPITOR) 80 MG tablet Take 1 tablet (80 mg total) by mouth once daily. 90 tablet 3    budesonide-formoterol 160-4.5 mcg (SYMBICORT) 160-4.5 mcg/actuation HFAA Inhale 2 puffs into the lungs every 12 (twelve) hours. Controller 3 Inhaler 3    calcitRIOL (ROCALTROL) 0.25 MCG Cap       calcium-vitamin D 500-125 mg-unit tablet Take 1 tablet by mouth as needed.        carvedilol (COREG) 6.25 MG tablet TAKE 1 TABLET (6.25 MG TOTAL) BY MOUTH 2 (TWO) TIMES DAILY WITH MEALS. 90 tablet 3    cyanocobalamin, vitamin B-12, (VITAMIN B-12) 1,000 mcg Subl Take 1 tablet by mouth daily as needed. Takes 3,000mcg      diclofenac sodium (VOLTAREN) 1 % Gel Apply 2 g topically 2 (two) times daily. To knees 2 Tube 3    finasteride (PROSCAR) 5 mg tablet TAKE 1 TABLET ONCE DAILY. 90 tablet 3    fluticasone (FLONASE) 50 mcg/actuation nasal spray 2 sprays (100 mcg total) by Each Nare route once daily. 3 Bottle 3    montelukast (SINGULAIR) 10 mg tablet Take 1 tablet (10 mg total) by mouth every evening. 90 tablet 3    nitroGLYCERIN (NITROSTAT) 0.4 MG SL tablet Place 1 tablet (0.4 mg total) under the tongue every 5 (five) minutes as needed for Chest pain. 25 tablet 4    predniSONE (DELTASONE) 20 MG tablet One daily for 3 days and repeat for flare of lung symptoms as intructed 12 tablet 0    sodium chloride (SALINE NASAL MIST) 3 % Mist 1 spray by Nasal route 2 (two) times daily.      terazosin (HYTRIN) 5 MG capsule Take 2 capsules (10 mg total) by mouth every evening. 90 capsule 3    tramadol (ULTRAM) 50 mg tablet Take 1 tablet (50 mg total) by mouth every 6 (six) hours as needed. 120 tablet 4    valsartan (DIOVAN) 160 MG tablet Take 1 tablet (160 mg total) by mouth once daily. 90 tablet 3    zolpidem (AMBIEN) 5 MG Tab Take 1 tablet (5 mg total) by mouth nightly as needed. 30 tablet 3       Scheduled meds:   calcitRIOL  0.25 mcg Oral Daily    carvedilol  6.25 mg Oral BID WM    ciprofloxacin  400 mg Intravenous Q12H    diclofenac sodium  2 g Topical (Top) BID    finasteride  5 mg Oral Daily    fluticasone  2 spray Each Nare Daily    fluticasone-vilanterol  1 puff Inhalation Daily    metronidazole  500 mg Intravenous Q8H    montelukast  10 mg Oral QHS    terazosin  10 mg Oral QHS       Infusions:   sodium chloride 0.9% 75 mL/hr at 02/25/18 1458       PRN meds:  acetaminophen,  acetaminophen, albuterol-ipratropium 2.5mg-0.5mg/3mL, dextrose 50%, dextrose 50%, glucagon (human recombinant), glucose, glucose, HYDROmorphone, ondansetron, oxyCODONE, ramelteon    Review of Systems:  Review of Systems   Constitutional: Positive for malaise/fatigue. Negative for chills, fever and weight loss.   HENT: Negative for hearing loss and nosebleeds.    Eyes: Negative for blurred vision, double vision and photophobia.   Respiratory: Negative for cough, shortness of breath and wheezing.    Cardiovascular: Negative for chest pain, palpitations and leg swelling.   Gastrointestinal: Positive for abdominal pain. Negative for constipation, diarrhea, heartburn, nausea and vomiting.   Genitourinary: Negative for dysuria, frequency and urgency.   Musculoskeletal: Positive for back pain. Negative for falls, joint pain and myalgias.   Skin: Negative for itching and rash.   Neurological: Negative for dizziness, speech change, focal weakness, loss of consciousness and headaches.   Endo/Heme/Allergies: Does not bruise/bleed easily.   Psychiatric/Behavioral: Negative for depression and substance abuse. The patient is not nervous/anxious.        OBJECTIVE:     Vital Signs and IO (Last 24H):  Temp:  [97.9 °F (36.6 °C)-98.7 °F (37.1 °C)]   Pulse:  [53-64]   Resp:  [18]   BP: ()/(63-73)   SpO2:  [96 %-100 %]   No intake/output data recorded.    Wt Readings from Last 5 Encounters:   02/25/18 102.1 kg (225 lb)   02/21/18 103.4 kg (227 lb 15.3 oz)   02/15/18 107 kg (235 lb 14.3 oz)   01/29/18 112.4 kg (247 lb 12.8 oz)   01/09/18 108.8 kg (239 lb 13.8 oz)         Physical Exam:  Physical Exam   Constitutional: He is oriented to person, place, and time. He appears well-developed and well-nourished.   HENT:   Head: Normocephalic and atraumatic.   Mouth/Throat: Oropharynx is clear and moist.   Eyes: EOM are normal. Pupils are equal, round, and reactive to light. No scleral icterus.   Neck: Neck supple.   Cardiovascular: Normal  rate and regular rhythm.    Pulmonary/Chest: Effort normal. No stridor. No respiratory distress.   Abdominal: Soft. Bowel sounds are normal. He exhibits distension. There is tenderness. There is rebound. There is no guarding.   Musculoskeletal: Normal range of motion. He exhibits no edema or deformity.   Neurological: He is alert and oriented to person, place, and time. No cranial nerve deficit.   Skin: Skin is warm and dry. No rash noted. He is not diaphoretic. No erythema.   Psychiatric: He has a normal mood and affect. His behavior is normal.       Body mass index is 29.69 kg/m².    Laboratory:    Recent Labs  Lab 02/25/18  1202      K 3.8      CO2 25   BUN 40*   CREATININE 4.7*   ESTGFRAFRICA 13*   EGFRNONAA 11*   CALCIUM 8.8   ALBUMIN 3.2*         Recent Labs  Lab 02/25/18  1202   WBC 6.90   HGB 9.9*   HCT 30.1*   *   MCV 90   MCHC 33.0   MONO 6.4  0.4         Recent Labs  Lab 02/25/18  1202   ALKPHOS 86   BILITOT 0.4   PROT 6.6   ALBUMIN 3.2*   ALT 13   AST 13         ASSESSMENT/PLAN:     Active Hospital Problems    Diagnosis  POA    *Sigmoid diverticulitis [K57.32]  Yes    Anemia due to stage 4 chronic kidney disease [N18.4, D63.1]  Yes    Back pain, chronic [M54.9, G89.29]  Yes    CKD (chronic kidney disease), stage III, eGFR 40, 7/2014, stage 4, eGFR 12, 1/2018 [N18.3]  Yes    CAD (coronary artery disease), 2V CABG 2007 [I25.10]  Yes    HTN (hypertension),  [I10]  Yes    Hyperlipidemia, baseline  [E78.5]  Yes      Resolved Hospital Problems    Diagnosis Date Resolved POA   No resolved problems to display.         NICK mild, likely volume depletion.  CKD stage 4, non-proteinuric, progressive recently, baseline sCr around 3-4.  Acute diverticulitis.  No NSAIDs, ACEI/ARB, IV contrast or other nephrotoxins.  Keep MAP > 60, SBP > 100.  Agree with gentle IVF.  Dose meds for GFR < 30 ml/min.  Renal diet - low K, low phos when able to take PO.  No emergent need for dialysis.  Treat  diverticulitis, renal dose for abx.    Anemia of CKD  Stable. Monitor.  No need for NIC.  No need for IV iron.    MBD / Secondary HPT  Monitor phos levels. Low phos diet. Continue calcitriol.    HTN  Controlled.   Tolerate asymptomatic HTN up to -160.  Continue home meds, except ACEi/ARB.  Low sodium diet.    Gout  Stable  Continue allopurinol.    BPH  Seems stable, continue meds,    Thank you for allowing us to participate in the care of your patient!   We will follow the patient and provide recommendations as needed.    Darrel Cary MD    [facility]  [address] [place] [address]  [place] [address]    [phone] - tel  [phone] - fax    2/25/2018 3:03 PM

## 2018-02-25 NOTE — PLAN OF CARE
Pt admitted to floor. IV infusing per order. IV anx infusing per order. Teds/Scd in place. Oxycodone administered for c/o pain per order. Pt tolerating clear liquid diet without problem.  Bed low locked will continue to monitor.

## 2018-02-25 NOTE — ED NOTES
Patient identifiers for Micheal Hunt checked and correct.  LOC: Patient is awake, alert, and aware of environment with an appropriate affect. Patient is oriented x 3 and speaking appropriately.  APPEARANCE: Patient resting comfortably and in no acute distress. Patient is clean and well groomed, patient's clothing is properly fastened.  SKIN: The skin is warm and dry. Patient has normal skin turgor and moist mucus membrances. Skin is intact; no bruising or breakdown noted.  MUSKULOSKELETAL: Patient is moving all extremities well, no obvious deformities noted. Pulses intact.   RESPIRATORY: Airway is open and patent. Respirations are spontaneous and non-labored with normal effort and rate.  CARDIAC: Patient has a normal rate and rhythm. No peripheral edema noted. Capillary refill < 3 seconds.  ABDOMEN: No distention noted. Bowel sounds active in all 4 quadrants. Soft. C//o lower abd pain that wraps around to his back states pain 10/10 on admit denies blood in stool or urinary S/SX  NEUROLOGICAL: PERRL. Facial expression is symmetrical. Hand grasps are equal bilaterally. Normal sensation in all extremities when touched with finger.  Allergies reported:   Review of patient's allergies indicates:   Allergen Reactions    Ace inhibitors Other (See Comments)     Cough    Arb-angiotensin receptor antagonist Itching    Eplerenone Other (See Comments)     Marked bradycardia, 40, tiredness and weakness

## 2018-02-25 NOTE — ED NOTES
C/o lower abd and lower back/flank pain that wraps around abd x3 days states pain is a 10/10 and that he has a HX of kidney disease. Alert calm aware to notify nurse of needs or concerns.

## 2018-02-26 ENCOUNTER — TELEPHONE (OUTPATIENT)
Dept: FAMILY MEDICINE | Facility: CLINIC | Age: 78
End: 2018-02-26

## 2018-02-26 ENCOUNTER — NURSE TRIAGE (OUTPATIENT)
Dept: ADMINISTRATIVE | Facility: CLINIC | Age: 78
End: 2018-02-26

## 2018-02-26 LAB
ANION GAP SERPL CALC-SCNC: 7 MMOL/L
BASOPHILS # BLD AUTO: 0 K/UL
BASOPHILS NFR BLD: 0.4 %
BUN SERPL-MCNC: 40 MG/DL
CALCIUM SERPL-MCNC: 8.3 MG/DL
CHLORIDE SERPL-SCNC: 106 MMOL/L
CO2 SERPL-SCNC: 24 MMOL/L
CREAT SERPL-MCNC: 4.3 MG/DL
DIFFERENTIAL METHOD: ABNORMAL
EOSINOPHIL # BLD AUTO: 0.2 K/UL
EOSINOPHIL NFR BLD: 4.1 %
ERYTHROCYTE [DISTWIDTH] IN BLOOD BY AUTOMATED COUNT: 14.1 %
EST. GFR  (AFRICAN AMERICAN): 14 ML/MIN/1.73 M^2
EST. GFR  (NON AFRICAN AMERICAN): 12 ML/MIN/1.73 M^2
GLUCOSE SERPL-MCNC: 90 MG/DL
HCT VFR BLD AUTO: 27.5 %
HGB BLD-MCNC: 9.1 G/DL
LYMPHOCYTES # BLD AUTO: 1.9 K/UL
LYMPHOCYTES NFR BLD: 32.6 %
MAGNESIUM SERPL-MCNC: 2.4 MG/DL
MCH RBC QN AUTO: 29.9 PG
MCHC RBC AUTO-ENTMCNC: 33 G/DL
MCV RBC AUTO: 90 FL
MONOCYTES # BLD AUTO: 0.3 K/UL
MONOCYTES NFR BLD: 5.9 %
NEUTROPHILS # BLD AUTO: 3.2 K/UL
NEUTROPHILS NFR BLD: 57 %
PHOSPHATE SERPL-MCNC: 3.9 MG/DL
PLATELET # BLD AUTO: 121 K/UL
PMV BLD AUTO: 8.3 FL
POTASSIUM SERPL-SCNC: 4.6 MMOL/L
RBC # BLD AUTO: 3.05 M/UL
SODIUM SERPL-SCNC: 137 MMOL/L
TROPONIN I SERPL DL<=0.01 NG/ML-MCNC: 0.01 NG/ML
WBC # BLD AUTO: 5.7 K/UL

## 2018-02-26 PROCEDURE — 63600175 PHARM REV CODE 636 W HCPCS: Performed by: INTERNAL MEDICINE

## 2018-02-26 PROCEDURE — 93005 ELECTROCARDIOGRAM TRACING: CPT

## 2018-02-26 PROCEDURE — S0030 INJECTION, METRONIDAZOLE: HCPCS | Performed by: HOSPITALIST

## 2018-02-26 PROCEDURE — 36415 COLL VENOUS BLD VENIPUNCTURE: CPT

## 2018-02-26 PROCEDURE — 85025 COMPLETE CBC W/AUTO DIFF WBC: CPT

## 2018-02-26 PROCEDURE — 25000003 PHARM REV CODE 250: Performed by: NURSE PRACTITIONER

## 2018-02-26 PROCEDURE — 84100 ASSAY OF PHOSPHORUS: CPT

## 2018-02-26 PROCEDURE — 94761 N-INVAS EAR/PLS OXIMETRY MLT: CPT

## 2018-02-26 PROCEDURE — 12000002 HC ACUTE/MED SURGE SEMI-PRIVATE ROOM

## 2018-02-26 PROCEDURE — 80048 BASIC METABOLIC PNL TOTAL CA: CPT

## 2018-02-26 PROCEDURE — 99900035 HC TECH TIME PER 15 MIN (STAT)

## 2018-02-26 PROCEDURE — 63600175 PHARM REV CODE 636 W HCPCS: Performed by: NURSE PRACTITIONER

## 2018-02-26 PROCEDURE — 84484 ASSAY OF TROPONIN QUANT: CPT | Mod: 91

## 2018-02-26 PROCEDURE — 25000003 PHARM REV CODE 250: Performed by: HOSPITALIST

## 2018-02-26 PROCEDURE — 83735 ASSAY OF MAGNESIUM: CPT

## 2018-02-26 PROCEDURE — 93010 ELECTROCARDIOGRAM REPORT: CPT | Mod: ,,, | Performed by: INTERNAL MEDICINE

## 2018-02-26 RX ORDER — NITROGLYCERIN 0.4 MG/1
TABLET SUBLINGUAL
Status: DISPENSED
Start: 2018-02-26 | End: 2018-02-27

## 2018-02-26 RX ORDER — NITROGLYCERIN 0.4 MG/1
0.4 TABLET SUBLINGUAL EVERY 5 MIN PRN
Status: DISCONTINUED | OUTPATIENT
Start: 2018-02-26 | End: 2018-02-27 | Stop reason: HOSPADM

## 2018-02-26 RX ORDER — CARVEDILOL 3.12 MG/1
3.12 TABLET ORAL 2 TIMES DAILY WITH MEALS
Status: DISCONTINUED | OUTPATIENT
Start: 2018-02-27 | End: 2018-02-27

## 2018-02-26 RX ADMIN — NITROGLYCERIN 0.4 MG: 0.4 TABLET SUBLINGUAL at 08:02

## 2018-02-26 RX ADMIN — ATORVASTATIN CALCIUM 80 MG: 40 TABLET, FILM COATED ORAL at 09:02

## 2018-02-26 RX ADMIN — CIPROFLOXACIN 400 MG: 2 INJECTION, SOLUTION INTRAVENOUS at 02:02

## 2018-02-26 RX ADMIN — METRONIDAZOLE 500 MG: 500 INJECTION, SOLUTION INTRAVENOUS at 09:02

## 2018-02-26 RX ADMIN — SIMETHICONE CHEW TAB 80 MG 160 MG: 80 TABLET ORAL at 08:02

## 2018-02-26 RX ADMIN — ACETAMINOPHEN 650 MG: 325 TABLET ORAL at 05:02

## 2018-02-26 RX ADMIN — PANTOPRAZOLE SODIUM 40 MG: 40 TABLET, DELAYED RELEASE ORAL at 09:02

## 2018-02-26 RX ADMIN — OXYCODONE HYDROCHLORIDE 10 MG: 10 TABLET ORAL at 04:02

## 2018-02-26 RX ADMIN — FINASTERIDE 5 MG: 5 TABLET, FILM COATED ORAL at 09:02

## 2018-02-26 RX ADMIN — METRONIDAZOLE 500 MG: 500 INJECTION, SOLUTION INTRAVENOUS at 04:02

## 2018-02-26 RX ADMIN — ASPIRIN 81 MG: 81 TABLET, COATED ORAL at 09:02

## 2018-02-26 RX ADMIN — CALCITRIOL 0.25 MCG: 0.25 CAPSULE, LIQUID FILLED ORAL at 09:02

## 2018-02-26 RX ADMIN — METRONIDAZOLE 500 MG: 500 INJECTION, SOLUTION INTRAVENOUS at 12:02

## 2018-02-26 NOTE — PROGRESS NOTES
INPATIENT NEPHROLOGY PROGRESS NOTE  [facility]    Micheal Hunt  02/26/2018    Reason for consultation:     chente    Chief Complaint:   Chief Complaint   Patient presents with    Back Pain     bilat flank pain & bandlike lower abd vs. suprapubic pain - atraumatic (s/s x 3 days) - reports Hx of kidney problems    Flank Pain        History of Present Illness:      HPI: 77M with progressive non-diabetic CKD stage 4 (sCr a year ago was 2.5 = GFR in the 20's, more recently around 3-4 = eGFR in 10's) followed by me, has been complaining of abdominal and back pain with abdominal distention, gas and burping for few days.       2/26  Still with abdominal pain but improved today.  No vomiting. No bm.  No sob or chest pain.  Tolerating liquids          Plan of Care:     Assessment:   chente better  ckd IV -- today at baseline (last cr in clinic was 4.3)  Hyponatremia -- better  anemia    Plan:     1. Acute Kidney Injury- no nsaids, coxibs.  Pt advised to try to drink 32 ounces per day    2. Volume/Blood Pressure-euvolemic    3. Electrolytes/Acid Base-sodium better      5. Anemia of CKD-check iron stores.  NIC if replete    6. Medications-renal dose for crcl 10-50       Thank you for allowing us to participate in this patient's care. We will continue to follow.    Medications:  No current facility-administered medications on file prior to encounter.      Current Outpatient Prescriptions on File Prior to Encounter   Medication Sig Dispense Refill    albuterol 90 mcg/actuation inhaler 2 puffs every 4 hours as needed for cough, wheeze, or shortness of breath 3 Inhaler 3    albuterol-ipratropium 2.5mg-0.5mg/3mL (DUO-NEB) 0.5 mg-3 mg(2.5 mg base)/3 mL nebulizer solution INHALE 1 VIAL BY NEBULIZER EVERY 6 HOURS AS NEEDED FOR WHEEZING 270 mL 0    allopurinol (ZYLOPRIM) 300 MG tablet Take 1 tablet (300 mg total) by mouth once daily. 90 tablet 3    amlodipine (NORVASC) 10 MG tablet Take 1 tablet (10 mg total) by mouth  before dinner. 90 tablet 3    aspirin (ECOTRIN) 81 MG EC tablet Take 81 mg by mouth once daily.        atorvastatin (LIPITOR) 80 MG tablet Take 1 tablet (80 mg total) by mouth once daily. 90 tablet 3    budesonide-formoterol 160-4.5 mcg (SYMBICORT) 160-4.5 mcg/actuation HFAA Inhale 2 puffs into the lungs every 12 (twelve) hours. Controller 3 Inhaler 3    calcitRIOL (ROCALTROL) 0.25 MCG Cap       calcium-vitamin D 500-125 mg-unit tablet Take 1 tablet by mouth as needed.       carvedilol (COREG) 6.25 MG tablet TAKE 1 TABLET (6.25 MG TOTAL) BY MOUTH 2 (TWO) TIMES DAILY WITH MEALS. 90 tablet 3    cyanocobalamin, vitamin B-12, (VITAMIN B-12) 1,000 mcg Subl Take 1 tablet by mouth daily as needed. Takes 3,000mcg      diclofenac sodium (VOLTAREN) 1 % Gel Apply 2 g topically 2 (two) times daily. To knees 2 Tube 3    finasteride (PROSCAR) 5 mg tablet TAKE 1 TABLET ONCE DAILY. 90 tablet 3    fluticasone (FLONASE) 50 mcg/actuation nasal spray 2 sprays (100 mcg total) by Each Nare route once daily. 3 Bottle 3    montelukast (SINGULAIR) 10 mg tablet Take 1 tablet (10 mg total) by mouth every evening. 90 tablet 3    nitroGLYCERIN (NITROSTAT) 0.4 MG SL tablet Place 1 tablet (0.4 mg total) under the tongue every 5 (five) minutes as needed for Chest pain. 25 tablet 4    predniSONE (DELTASONE) 20 MG tablet One daily for 3 days and repeat for flare of lung symptoms as intructed 12 tablet 0    sodium chloride (SALINE NASAL MIST) 3 % Mist 1 spray by Nasal route 2 (two) times daily.      terazosin (HYTRIN) 5 MG capsule Take 2 capsules (10 mg total) by mouth every evening. 90 capsule 3    tramadol (ULTRAM) 50 mg tablet Take 1 tablet (50 mg total) by mouth every 6 (six) hours as needed. 120 tablet 4    valsartan (DIOVAN) 160 MG tablet Take 1 tablet (160 mg total) by mouth once daily. 90 tablet 3    zolpidem (AMBIEN) 5 MG Tab Take 1 tablet (5 mg total) by mouth nightly as needed. 30 tablet 3     Scheduled Meds:   [START ON  2/28/2018] ascorbic acid (vitamin C)  500 mg Oral QHS    aspirin  81 mg Oral Daily    atorvastatin  80 mg Oral Daily    calcitRIOL  0.25 mcg Oral Daily    carvedilol  6.25 mg Oral BID WM    ciprofloxacin  400 mg Intravenous Q24H    diclofenac sodium  2 g Topical (Top) BID    [START ON 2/28/2018] ferrous sulfate  325 mg Oral BID WM    finasteride  5 mg Oral Daily    fluticasone  2 spray Each Nare Daily    fluticasone-vilanterol  1 puff Inhalation Daily    [START ON 2/28/2018] folic acid-vit B6-vit B12 2.5-25-2 mg  1 tablet Oral Daily    metronidazole  500 mg Intravenous Q8H    montelukast  10 mg Oral QHS    pantoprazole  40 mg Oral Daily    terazosin  10 mg Oral QHS     Continuous Infusions:   sodium chloride 0.9% 50 mL/hr at 02/25/18 1933     PRN Meds:.acetaminophen, acetaminophen, albuterol-ipratropium 2.5mg-0.5mg/3mL, dextrose 50%, dextrose 50%, famotidine, glucagon (human recombinant), glucose, glucose, HYDROmorphone, ondansetron, oxyCODONE, ramelteon, simethicone    Allergies:  Ace inhibitors; Arb-angiotensin receptor antagonist; and Eplerenone    Vital Signs:  Temp Readings from Last 3 Encounters:   02/26/18 98 °F (36.7 °C)   02/15/18 98.6 °F (37 °C) (Oral)   01/09/18 97.7 °F (36.5 °C) (Oral)       Pulse Readings from Last 3 Encounters:   02/26/18 (!) 50   02/21/18 60   02/15/18 61       BP Readings from Last 3 Encounters:   02/26/18 138/66   02/21/18 130/69   02/15/18 123/68       Weight:  Wt Readings from Last 3 Encounters:   02/25/18 102.1 kg (225 lb)   02/21/18 103.4 kg (227 lb 15.3 oz)   02/15/18 107 kg (235 lb 14.3 oz)       Review of Systems:  Review of Systems - All 14 systems reviewed and negative, except as noted in HPI    Physical Exam:    Constitutional: NAD  Neuro: No asterixis  Psych: Calm  EENT: NCAT, anicteric sclera   Neck:  supple  Cards: No rub  Lungs: clear to ausculation  GI:  Pos bowel sounds, tender with palpation  MSK:  WNWD  Skin: no purpura, rashes       Results:  Lab  Results   Component Value Date     02/26/2018    K 4.6 02/26/2018     02/26/2018    CO2 24 02/26/2018    BUN 40 (H) 02/26/2018    CREATININE 4.3 (H) 02/26/2018    CALCIUM 8.3 (L) 02/26/2018    ANIONGAP 7 (L) 02/26/2018    ESTGFRAFRICA 14 (A) 02/26/2018    EGFRNONAA 12 (A) 02/26/2018       Lab Results   Component Value Date    CALCIUM 8.3 (L) 02/26/2018    PHOS 3.9 02/26/2018         Recent Labs  Lab 02/26/18  0528   WBC 5.70   RBC 3.05*   HGB 9.1*   HCT 27.5*   *   MCV 90   MCH 29.9   MCHC 33.0       I have personally reviewed pertinent radiological imaging and reports.        Frankie Rojo MD  Nephrology  [facility] [place]  [phone]

## 2018-02-26 NOTE — HPI
Micheal Hunt is a 77 y.o. male with a hx of Kidney disease stage 4, CAD, HTN, LVH, and GERD who presents to the ED with complaints of 10/10 suprapubic abd pain associated with bilateral flank pain and abd bloating x 3 days. He has not taken any pain medication. Pt denies nausea, vomiting, trouble urinating, CP , SOB and fever. Allergens include Ace inhibitors, Arb- angiotensin and Eplerenone. Patient was evaluated in ER and found to have diverticulitis and admitted for further evaluation and treatment.

## 2018-02-26 NOTE — PROGRESS NOTES
[facility] Hospital Medicine  Progress Note    Patient Name: Micheal Hunt  MRN: [id number]  Patient Class: IP- Inpatient   Admission Date: 2/25/2018  Length of Stay: 1 days  Attending Physician: Tim Arce MD  Primary Care Provider: Pk Lakhani MD      Subjective:     Principal Problem:Sigmoid diverticulitis    HPI:  Micheal Hunt is a 77 y.o. male with a hx of Kidney disease stage 4, CAD, HTN, LVH, and GERD who presents to the ED with complaints of 10/10 suprapubic abd pain associated with bilateral flank pain and abd bloating x 3 days. He has not taken any pain medication. Pt denies nausea, vomiting, trouble urinating, CP , SOB and fever. Allergens include Ace inhibitors, Arb- angiotensin and Eplerenone. Patient was evaluated in ER and found to have diverticulitis and admitted for further evaluation and treatment.    Hospital Course:  The patient was monitored closely during his stay. He received Ivf for hydration and a nephrology consult for his CKD. Patient continued on Iv flagyl and iv cipro for diverticulitis. He was initially given CL diet but was advanced as tolerated.       Interval History: Patient reports feeling better but still with generalized abdominal pain. Continue IVf and Iv antibiotics. Try FL today and see how patient tolerates.       Review of Systems   Constitutional: Positive for fatigue. Negative for activity change, appetite change, chills, diaphoresis and fever.   HENT: Negative for ear discharge, ear pain and facial swelling.    Eyes: Negative for pain and redness.   Respiratory: Negative for apnea, cough, choking, chest tightness, shortness of breath, wheezing and stridor.    Cardiovascular: Negative for chest pain, palpitations and leg swelling.   Gastrointestinal: Positive for abdominal pain. Negative for nausea and vomiting.   Endocrine: Negative for polydipsia and polyphagia.   Genitourinary: Negative for difficulty urinating, flank pain and  hematuria.   Musculoskeletal: Positive for back pain. Negative for gait problem, neck pain and neck stiffness.   Skin: Negative for color change.   Allergic/Immunologic: Negative for food allergies.   Neurological: Negative for syncope, facial asymmetry, speech difficulty and weakness.   Hematological: Does not bruise/bleed easily.   Psychiatric/Behavioral: Negative for agitation, behavioral problems, confusion, hallucinations and suicidal ideas. The patient is not nervous/anxious.      Objective:     Vital Signs (Most Recent):  Temp: 98.2 °F (36.8 °C) (02/26/18 1117)  Pulse: (!) 53 (02/26/18 1117)  Resp: 18 (02/26/18 1117)  BP: 121/69 (02/26/18 1117)  SpO2: (!) 93 % (02/26/18 1117) Vital Signs (24h Range):  Temp:  [96.8 °F (36 °C)-98.7 °F (37.1 °C)] 98.2 °F (36.8 °C)  Pulse:  [50-66] 53  Resp:  [18] 18  SpO2:  [93 %-100 %] 93 %  BP: (121-152)/(63-73) 121/69     Weight: 102.1 kg (225 lb)  Body mass index is 29.69 kg/m².    Physical Exam   Constitutional: He is oriented to person, place, and time. He appears well-developed and well-nourished. No distress.   HENT:   Head: Normocephalic and atraumatic.   Eyes: Conjunctivae and EOM are normal. Pupils are equal, round, and reactive to light. Right eye exhibits no discharge. Left eye exhibits no discharge.   Neck: Normal range of motion. Neck supple. No JVD present.   Cardiovascular: Normal rate, regular rhythm and intact distal pulses.    Murmur heard.  Pulmonary/Chest: Effort normal and breath sounds normal. No stridor. No respiratory distress. He has no wheezes. He has no rales. He exhibits no tenderness.   Abdominal: Soft. Bowel sounds are normal. There is tenderness. There is no guarding.   Generalized abdominal tenderness- No rebound   Genitourinary:   Genitourinary Comments: Not examined   Musculoskeletal: Normal range of motion. He exhibits no edema, tenderness or deformity.   Neurological: He is alert and oriented to person, place, and time.   Skin: Skin is warm  and dry. Capillary refill takes less than 2 seconds. He is not diaphoretic.   Psychiatric: He has a normal mood and affect. His behavior is normal. Judgment and thought content normal.         CRANIAL NERVES     CN III, IV, VI   Pupils are equal, round, and reactive to light.  Extraocular motions are normal.      Labs: Reviewed    Assessment/Plan:      * Sigmoid diverticulitis    Continue Iv cipro and flagyl  Continue IVF  Advance Clear liquid diet to Full liquid renal  Prn pain and antiemetic medication        Renal cyst    Chronic          Thrombocytopenia    Monitor  Decrease asa to qod           Anemia due to stage 4 chronic kidney disease    Monitor  Continue B vitamins and iron     NICK/ CKD Stage 4-  Continue IVF  Nephrology consulted  Renal dose all medications               Back pain, chronic    Continue home prn medications          Diastolic dysfunction    Per hx-  Monitor volume status closely for any signs of volume overload          GERD (gastroesophageal reflux disease)    Continue PPI          CAD (coronary artery disease), 2V CABG 2007    Continue home asa, statin, bblockade          Hyperlipidemia, baseline     Continue home statin          Benign essential HTN    Stable  Continue home blockade  Home Norvasc and ARB currently on hold          BPH (benign prostatic hyperplasia)    Continue home finasteride and terazosin              VTE Risk Mitigation         Ordered     Medium Risk of VTE  Once      02/25/18 1429     Place sequential compression device  Until discontinued      02/25/18 1429     Place LOYD hose  Until discontinued      02/25/18 1429        ROSALBA Ta  Department of Hospital Medicine   [facility]    Time spent seeing patient( greater than 1/2 spent in direct contact) : 28 minutes

## 2018-02-26 NOTE — PROGRESS NOTES
02/26/18 0832   Patient Assessment/Suction   Level of Consciousness (AVPU) alert   PRE-TX-O2-ETCO2   O2 Device (Oxygen Therapy) room air   SpO2 98 %   Pulse Oximetry Type Intermittent   $ Pulse Oximetry - Multiple Charge Pulse Oximetry - Multiple   Aerosol Therapy   $ Aerosol Therapy Charges PRN treatment not required   Respiratory Treatment Status PRN treatment not required

## 2018-02-26 NOTE — HOSPITAL COURSE
The patient was monitored closely during his stay. He received Ivf for hydration and a nephrology consult for his NICK with CKD stage 4. Patient continued on Iv flagyl and iv cipro for diverticulitis. He was initially given CL diet but diet was advanced as tolerated. The patient's GI status remained stable and his abdominal pain resolved. He was noted to have some complaints of chest pain and some noted bradycardia during his hospitalization. He received a cardiology consult with Dr. Dunlap. It was felt his chest pain was GI related. His home bblockade was discontinued. His overall condition remained stable and improved and he was discharged to home. Patient was to follow up with GI for out patient consultation.

## 2018-02-26 NOTE — H&P
[facility] Medicine  History & Physical    Patient Name: Micheal Hunt  MRN: [id number]  Admission Date: 2/25/2018  Attending Physician: Nandini Herndon MD   Primary Care Provider: Pk Lakhani MD         Patient information was obtained from patient and ER records.     Subjective:     Principal Problem:Sigmoid diverticulitis    Chief Complaint:   Chief Complaint   Patient presents with    Back Pain     bilat flank pain & bandlike lower abd vs. suprapubic pain - atraumatic (s/s x 3 days) - reports Hx of kidney problems    Flank Pain        HPI: Micheal Hunt is a 77 y.o. male with a hx of Kidney disease stage 4, CAD, HTN, LVH, and GERD who presents to the ED with complaints of 10/10 suprapubic abd pain associated with bilateral flank pain and abd bloating x 3 days. He has not taken any pain medication. Pt denies nausea, vomiting, trouble urinating, CP , SOB and fever. Allergens include Ace inhibitors, Arb- angiotensin and Eplerenone. Patient was evaluated in ER and found to have diverticulitis and admitted for further evaluation and treatment.    Past Medical History:   Diagnosis Date    Allergy     Arthritis     Gout    BPH (benign prostatic hyperplasia)     CAD (coronary artery disease) 2006    Chronic kidney disease     due to ibuprofen    Colon polyp     Diverticulosis     GERD (gastroesophageal reflux disease)     HTN (hypertension) 3/27/2012    Hyperlipidemia     LVH (left ventricular hypertrophy)     Murmur, cardiac 3/27/2012    GRICELDA (obstructive sleep apnea)     DOES NOT USE A MACHINE    Sinus problem     Syncope and collapse        Past Surgical History:   Procedure Laterality Date    APPENDECTOMY      CARDIAC CATHETERIZATION      COLONOSCOPY  2011    CORONARY ARTERY BYPASS GRAFT  4/2007    x 1    JOINT REPLACEMENT      left knee total replacement  X 3    mid leftt finger      from a cactuss    MOLE REMOVAL  2016    rotative cuff      no  rotative cuffs on bilat shoulders has pins     SHOULDER SURGERY      shoulder surgery bilat  RIGHT X 4; LEFT X 3       Review of patient's allergies indicates:   Allergen Reactions    Ace inhibitors Other (See Comments)     Cough    Arb-angiotensin receptor antagonist Itching    Eplerenone Other (See Comments)     Marked bradycardia, 40, tiredness and weakness         No current facility-administered medications on file prior to encounter.      Current Outpatient Prescriptions on File Prior to Encounter   Medication Sig    albuterol 90 mcg/actuation inhaler 2 puffs every 4 hours as needed for cough, wheeze, or shortness of breath    albuterol-ipratropium 2.5mg-0.5mg/3mL (DUO-NEB) 0.5 mg-3 mg(2.5 mg base)/3 mL nebulizer solution INHALE 1 VIAL BY NEBULIZER EVERY 6 HOURS AS NEEDED FOR WHEEZING    allopurinol (ZYLOPRIM) 300 MG tablet Take 1 tablet (300 mg total) by mouth once daily.    amlodipine (NORVASC) 10 MG tablet Take 1 tablet (10 mg total) by mouth before dinner.    aspirin (ECOTRIN) 81 MG EC tablet Take 81 mg by mouth once daily.      atorvastatin (LIPITOR) 80 MG tablet Take 1 tablet (80 mg total) by mouth once daily.    budesonide-formoterol 160-4.5 mcg (SYMBICORT) 160-4.5 mcg/actuation HFAA Inhale 2 puffs into the lungs every 12 (twelve) hours. Controller    calcitRIOL (ROCALTROL) 0.25 MCG Cap     calcium-vitamin D 500-125 mg-unit tablet Take 1 tablet by mouth as needed.     carvedilol (COREG) 6.25 MG tablet TAKE 1 TABLET (6.25 MG TOTAL) BY MOUTH 2 (TWO) TIMES DAILY WITH MEALS.    cyanocobalamin, vitamin B-12, (VITAMIN B-12) 1,000 mcg Subl Take 1 tablet by mouth daily as needed. Takes 3,000mcg    diclofenac sodium (VOLTAREN) 1 % Gel Apply 2 g topically 2 (two) times daily. To knees    finasteride (PROSCAR) 5 mg tablet TAKE 1 TABLET ONCE DAILY.    fluticasone (FLONASE) 50 mcg/actuation nasal spray 2 sprays (100 mcg total) by Each Nare route once daily.    montelukast (SINGULAIR) 10 mg tablet  Take 1 tablet (10 mg total) by mouth every evening.    nitroGLYCERIN (NITROSTAT) 0.4 MG SL tablet Place 1 tablet (0.4 mg total) under the tongue every 5 (five) minutes as needed for Chest pain.    predniSONE (DELTASONE) 20 MG tablet One daily for 3 days and repeat for flare of lung symptoms as intructed    sodium chloride (SALINE NASAL MIST) 3 % Mist 1 spray by Nasal route 2 (two) times daily.    terazosin (HYTRIN) 5 MG capsule Take 2 capsules (10 mg total) by mouth every evening.    tramadol (ULTRAM) 50 mg tablet Take 1 tablet (50 mg total) by mouth every 6 (six) hours as needed.    valsartan (DIOVAN) 160 MG tablet Take 1 tablet (160 mg total) by mouth once daily.    zolpidem (AMBIEN) 5 MG Tab Take 1 tablet (5 mg total) by mouth nightly as needed.    [DISCONTINUED] amoxicillin (AMOXIL) 500 MG capsule Take 1 capsule (500 mg total) by mouth every 12 (twelve) hours.     Family History     Problem Relation (Age of Onset)    Heart disease Mother, Sister    Stroke Sister    Sudden death Father        Social History Main Topics    Smoking status: Never Smoker    Smokeless tobacco: Never Used    Alcohol use Yes      Comment: rare    Drug use: No    Sexual activity: Not on file     Review of Systems   Constitutional: Positive for chills, fatigue and fever. Negative for activity change and appetite change.   HENT: Negative for ear discharge, ear pain and facial swelling.    Eyes: Negative for pain and redness.   Respiratory: Negative for apnea, cough, choking, chest tightness, shortness of breath, wheezing and stridor.    Cardiovascular: Negative for chest pain, palpitations and leg swelling.   Gastrointestinal: Positive for abdominal pain and nausea. Negative for vomiting.   Endocrine: Negative for polydipsia and polyphagia.   Genitourinary: Positive for flank pain. Negative for difficulty urinating and hematuria.   Musculoskeletal: Positive for back pain. Negative for gait problem, neck pain and neck  stiffness.   Allergic/Immunologic: Negative for food allergies.   Neurological: Negative for syncope, facial asymmetry, speech difficulty and weakness.   Hematological: Does not bruise/bleed easily.   Psychiatric/Behavioral: Negative for agitation, behavioral problems, confusion, hallucinations and suicidal ideas. The patient is not nervous/anxious.      Objective:     Vital Signs (Most Recent):  Temp: 96.8 °F (36 °C) (02/25/18 1622)  Pulse: 64 (02/25/18 1622)  Resp: 18 (02/25/18 1622)  BP: 133/73 (02/25/18 1622)  SpO2: 98 % (02/25/18 1622) Vital Signs (24h Range):  Temp:  [96.8 °F (36 °C)-98.7 °F (37.1 °C)] 96.8 °F (36 °C)  Pulse:  [53-64] 64  Resp:  [18] 18  SpO2:  [96 %-100 %] 98 %  BP: ()/(63-73) 133/73     Weight: 102.1 kg (225 lb)  Body mass index is 29.69 kg/m².    Physical Exam   Constitutional: He is oriented to person, place, and time. He appears well-developed and well-nourished. No distress.   HENT:   Head: Normocephalic and atraumatic.   Eyes: Conjunctivae and EOM are normal. Pupils are equal, round, and reactive to light. Right eye exhibits no discharge. Left eye exhibits no discharge.   Neck: Normal range of motion. Neck supple. No JVD present.   Cardiovascular: Normal rate, regular rhythm and intact distal pulses.    Murmur heard.  Pulmonary/Chest: Effort normal and breath sounds normal. No stridor. No respiratory distress. He has no wheezes. He has no rales. He exhibits no tenderness.   Abdominal: Soft. Bowel sounds are normal. There is tenderness. There is no guarding.   Generalized abdominal tenderness   Genitourinary:   Genitourinary Comments: Not examined   Musculoskeletal: Normal range of motion. He exhibits no edema, tenderness or deformity.   Neurological: He is alert and oriented to person, place, and time.   Skin: Skin is warm and dry. Capillary refill takes less than 2 seconds. He is not diaphoretic.   Psychiatric: He has a normal mood and affect. His behavior is normal. Judgment and  thought content normal.         CRANIAL NERVES     CN III, IV, VI   Pupils are equal, round, and reactive to light.  Extraocular motions are normal.        Significant Labs: Reviewed    Significant Imaging: Reviewed    Assessment/Plan:     * Sigmoid diverticulitis    CL diet  Continue IVF  Continue Iv cipro and flagyl          Renal cyst    Not new- Pt seen out patient by Dr. Bishop          Thrombocytopenia    Monitor          Anemia due to stage 4 chronic kidney disease    Monitor  Continue B vitamins and iron       NICK/ CKD Stage 4-  Continue IVF  Nephrology consulted  Renal dose all medications     Back pain, chronic    Continue home prn medications          Diastolic dysfunction    Per hx-  Monitor volume status closely for any signs of volume overload          GERD (gastroesophageal reflux disease)    Add PPI          CAD (coronary artery disease), 2V CABG 2007    Continue home asa, statin, bblockade          Hyperlipidemia, baseline     Continue home statin          Benign essential HTN    Continue home blockade  Home Norvasc and ARB currently on hold          BPH (benign prostatic hyperplasia)    Continue home finasteride and terazosin              VTE Risk Mitigation         Ordered     Medium Risk of VTE  Once      02/25/18 1429     Place sequential compression device  Until discontinued      02/25/18 1429     Place LOYD hose  Until discontinued      02/25/18 1429          ROSALBA Ta  Department of Hospital Medicine   [facility]    Time spent seeing patient( greater than 1/2 spent in direct contact) : 73 minutes

## 2018-02-26 NOTE — SUBJECTIVE & OBJECTIVE
Past Medical History:   Diagnosis Date    Allergy     Arthritis     Gout    BPH (benign prostatic hyperplasia)     CAD (coronary artery disease) 2006    Chronic kidney disease     due to ibuprofen    Colon polyp     Diverticulosis     GERD (gastroesophageal reflux disease)     HTN (hypertension) 3/27/2012    Hyperlipidemia     LVH (left ventricular hypertrophy)     Murmur, cardiac 3/27/2012    GRICELDA (obstructive sleep apnea)     DOES NOT USE A MACHINE    Sinus problem     Syncope and collapse        Past Surgical History:   Procedure Laterality Date    APPENDECTOMY      CARDIAC CATHETERIZATION      COLONOSCOPY  2011    CORONARY ARTERY BYPASS GRAFT  4/2007    x 1    JOINT REPLACEMENT      left knee total replacement  X 3    mid leftt finger      from a cactuss    MOLE REMOVAL  2016    rotative cuff      no rotative cuffs on bilat shoulders has pins     SHOULDER SURGERY      shoulder surgery bilat  RIGHT X 4; LEFT X 3       Review of patient's allergies indicates:   Allergen Reactions    Ace inhibitors Other (See Comments)     Cough    Arb-angiotensin receptor antagonist Itching    Eplerenone Other (See Comments)     Marked bradycardia, 40, tiredness and weakness         No current facility-administered medications on file prior to encounter.      Current Outpatient Prescriptions on File Prior to Encounter   Medication Sig    albuterol 90 mcg/actuation inhaler 2 puffs every 4 hours as needed for cough, wheeze, or shortness of breath    albuterol-ipratropium 2.5mg-0.5mg/3mL (DUO-NEB) 0.5 mg-3 mg(2.5 mg base)/3 mL nebulizer solution INHALE 1 VIAL BY NEBULIZER EVERY 6 HOURS AS NEEDED FOR WHEEZING    allopurinol (ZYLOPRIM) 300 MG tablet Take 1 tablet (300 mg total) by mouth once daily.    amlodipine (NORVASC) 10 MG tablet Take 1 tablet (10 mg total) by mouth before dinner.    aspirin (ECOTRIN) 81 MG EC tablet Take 81 mg by mouth once daily.      atorvastatin (LIPITOR) 80 MG tablet Take 1  tablet (80 mg total) by mouth once daily.    budesonide-formoterol 160-4.5 mcg (SYMBICORT) 160-4.5 mcg/actuation HFAA Inhale 2 puffs into the lungs every 12 (twelve) hours. Controller    calcitRIOL (ROCALTROL) 0.25 MCG Cap     calcium-vitamin D 500-125 mg-unit tablet Take 1 tablet by mouth as needed.     carvedilol (COREG) 6.25 MG tablet TAKE 1 TABLET (6.25 MG TOTAL) BY MOUTH 2 (TWO) TIMES DAILY WITH MEALS.    cyanocobalamin, vitamin B-12, (VITAMIN B-12) 1,000 mcg Subl Take 1 tablet by mouth daily as needed. Takes 3,000mcg    diclofenac sodium (VOLTAREN) 1 % Gel Apply 2 g topically 2 (two) times daily. To knees    finasteride (PROSCAR) 5 mg tablet TAKE 1 TABLET ONCE DAILY.    fluticasone (FLONASE) 50 mcg/actuation nasal spray 2 sprays (100 mcg total) by Each Nare route once daily.    montelukast (SINGULAIR) 10 mg tablet Take 1 tablet (10 mg total) by mouth every evening.    nitroGLYCERIN (NITROSTAT) 0.4 MG SL tablet Place 1 tablet (0.4 mg total) under the tongue every 5 (five) minutes as needed for Chest pain.    predniSONE (DELTASONE) 20 MG tablet One daily for 3 days and repeat for flare of lung symptoms as intructed    sodium chloride (SALINE NASAL MIST) 3 % Mist 1 spray by Nasal route 2 (two) times daily.    terazosin (HYTRIN) 5 MG capsule Take 2 capsules (10 mg total) by mouth every evening.    tramadol (ULTRAM) 50 mg tablet Take 1 tablet (50 mg total) by mouth every 6 (six) hours as needed.    valsartan (DIOVAN) 160 MG tablet Take 1 tablet (160 mg total) by mouth once daily.    zolpidem (AMBIEN) 5 MG Tab Take 1 tablet (5 mg total) by mouth nightly as needed.    [DISCONTINUED] amoxicillin (AMOXIL) 500 MG capsule Take 1 capsule (500 mg total) by mouth every 12 (twelve) hours.     Family History     Problem Relation (Age of Onset)    Heart disease Mother, Sister    Stroke Sister    Sudden death Father        Social History Main Topics    Smoking status: Never Smoker    Smokeless tobacco: Never  Used    Alcohol use Yes      Comment: rare    Drug use: No    Sexual activity: Not on file     Review of Systems   Constitutional: Positive for chills, fatigue and fever. Negative for activity change and appetite change.   HENT: Negative for ear discharge, ear pain and facial swelling.    Eyes: Negative for pain and redness.   Respiratory: Negative for apnea, cough, choking, chest tightness, shortness of breath, wheezing and stridor.    Cardiovascular: Negative for chest pain, palpitations and leg swelling.   Gastrointestinal: Positive for abdominal pain and nausea. Negative for vomiting.   Endocrine: Negative for polydipsia and polyphagia.   Genitourinary: Positive for flank pain. Negative for difficulty urinating and hematuria.   Musculoskeletal: Positive for back pain. Negative for gait problem, neck pain and neck stiffness.   Allergic/Immunologic: Negative for food allergies.   Neurological: Negative for syncope, facial asymmetry, speech difficulty and weakness.   Hematological: Does not bruise/bleed easily.   Psychiatric/Behavioral: Negative for agitation, behavioral problems, confusion, hallucinations and suicidal ideas. The patient is not nervous/anxious.      Objective:     Vital Signs (Most Recent):  Temp: 96.8 °F (36 °C) (02/25/18 1622)  Pulse: 64 (02/25/18 1622)  Resp: 18 (02/25/18 1622)  BP: 133/73 (02/25/18 1622)  SpO2: 98 % (02/25/18 1622) Vital Signs (24h Range):  Temp:  [96.8 °F (36 °C)-98.7 °F (37.1 °C)] 96.8 °F (36 °C)  Pulse:  [53-64] 64  Resp:  [18] 18  SpO2:  [96 %-100 %] 98 %  BP: ()/(63-73) 133/73     Weight: 102.1 kg (225 lb)  Body mass index is 29.69 kg/m².    Physical Exam   Constitutional: He is oriented to person, place, and time. He appears well-developed and well-nourished. No distress.   HENT:   Head: Normocephalic and atraumatic.   Eyes: Conjunctivae and EOM are normal. Pupils are equal, round, and reactive to light. Right eye exhibits no discharge. Left eye exhibits no  discharge.   Neck: Normal range of motion. Neck supple. No JVD present.   Cardiovascular: Normal rate, regular rhythm and intact distal pulses.    Murmur heard.  Pulmonary/Chest: Effort normal and breath sounds normal. No stridor. No respiratory distress. He has no wheezes. He has no rales. He exhibits no tenderness.   Abdominal: Soft. Bowel sounds are normal. There is tenderness. There is no guarding.   Generalized abdominal tenderness   Genitourinary:   Genitourinary Comments: Not examined   Musculoskeletal: Normal range of motion. He exhibits no edema, tenderness or deformity.   Neurological: He is alert and oriented to person, place, and time.   Skin: Skin is warm and dry. Capillary refill takes less than 2 seconds. He is not diaphoretic.   Psychiatric: He has a normal mood and affect. His behavior is normal. Judgment and thought content normal.         CRANIAL NERVES     CN III, IV, VI   Pupils are equal, round, and reactive to light.  Extraocular motions are normal.        Significant Labs: Reviewed    Significant Imaging: Reviewed

## 2018-02-26 NOTE — DISCHARGE INSTRUCTIONS
Thank you for choosing [facility] for your medical care. The primary doctor who is taking care of you at the time of your discharge is Tim Arce MD.     You were admitted to the hospital with Sigmoid diverticulitis.     Please note your discharge instructions, including diet/activity restrictions, follow-up appointments, and medication changes.  If you have any questions about your medical issues, prescriptions, or any other questions, please feel free to contact the [facility] Medicine Dept at [phone] and we will help.    Please direct all long term medication refills and follow up to your primary care provider, Pk Lakhani MD. Thank you again for letting us take care of your health care needs.    Please note the following discharge instructions per your discharging physician-  Rere Serna Np

## 2018-02-26 NOTE — PLAN OF CARE
Problem: Patient Care Overview  Goal: Plan of Care Review  Outcome: Ongoing (interventions implemented as appropriate)  Pt free of injury, able to call for assistance when needed, vital signs stable.Iv fluids infusing. Iv antibiotics given per orders. Pt complains of abdominal pain but refuses pain medicine. Diet increased to full liquid diet and pt is tolerating. SCDs in place, refused lashell hose. POC discused with pt. Will continue to monitor pt.

## 2018-02-26 NOTE — ASSESSMENT & PLAN NOTE
Continue Iv cipro and flagyl  Continue IVF  Advance Clear liquid diet to Full liquid renal  Prn pain and antiemetic medication

## 2018-02-26 NOTE — SUBJECTIVE & OBJECTIVE
Interval History: Patient reports feeling better but still with generalized abdominal pain. Continue IVf and Iv antibiotics. Try FL today and see how patient tolerates.       Review of Systems   Constitutional: Positive for fatigue. Negative for activity change, appetite change, chills, diaphoresis and fever.   HENT: Negative for ear discharge, ear pain and facial swelling.    Eyes: Negative for pain and redness.   Respiratory: Negative for apnea, cough, choking, chest tightness, shortness of breath, wheezing and stridor.    Cardiovascular: Negative for chest pain, palpitations and leg swelling.   Gastrointestinal: Positive for abdominal pain. Negative for nausea and vomiting.   Endocrine: Negative for polydipsia and polyphagia.   Genitourinary: Negative for difficulty urinating, flank pain and hematuria.   Musculoskeletal: Positive for back pain. Negative for gait problem, neck pain and neck stiffness.   Skin: Negative for color change.   Allergic/Immunologic: Negative for food allergies.   Neurological: Negative for syncope, facial asymmetry, speech difficulty and weakness.   Hematological: Does not bruise/bleed easily.   Psychiatric/Behavioral: Negative for agitation, behavioral problems, confusion, hallucinations and suicidal ideas. The patient is not nervous/anxious.      Objective:     Vital Signs (Most Recent):  Temp: 98.2 °F (36.8 °C) (02/26/18 1117)  Pulse: (!) 53 (02/26/18 1117)  Resp: 18 (02/26/18 1117)  BP: 121/69 (02/26/18 1117)  SpO2: (!) 93 % (02/26/18 1117) Vital Signs (24h Range):  Temp:  [96.8 °F (36 °C)-98.7 °F (37.1 °C)] 98.2 °F (36.8 °C)  Pulse:  [50-66] 53  Resp:  [18] 18  SpO2:  [93 %-100 %] 93 %  BP: (121-152)/(63-73) 121/69     Weight: 102.1 kg (225 lb)  Body mass index is 29.69 kg/m².    Physical Exam   Constitutional: He is oriented to person, place, and time. He appears well-developed and well-nourished. No distress.   HENT:   Head: Normocephalic and atraumatic.   Eyes: Conjunctivae and  EOM are normal. Pupils are equal, round, and reactive to light. Right eye exhibits no discharge. Left eye exhibits no discharge.   Neck: Normal range of motion. Neck supple. No JVD present.   Cardiovascular: Normal rate, regular rhythm and intact distal pulses.    Murmur heard.  Pulmonary/Chest: Effort normal and breath sounds normal. No stridor. No respiratory distress. He has no wheezes. He has no rales. He exhibits no tenderness.   Abdominal: Soft. Bowel sounds are normal. There is tenderness. There is no guarding.   Generalized abdominal tenderness- No rebound   Genitourinary:   Genitourinary Comments: Not examined   Musculoskeletal: Normal range of motion. He exhibits no edema, tenderness or deformity.   Neurological: He is alert and oriented to person, place, and time.   Skin: Skin is warm and dry. Capillary refill takes less than 2 seconds. He is not diaphoretic.   Psychiatric: He has a normal mood and affect. His behavior is normal. Judgment and thought content normal.         CRANIAL NERVES     CN III, IV, VI   Pupils are equal, round, and reactive to light.  Extraocular motions are normal.      Labs: Reviewed

## 2018-02-26 NOTE — PROGRESS NOTES
02/25/18 2100   PRE-TX-O2-ETCO2   O2 Device (Oxygen Therapy) room air   SpO2 98 %   Pulse Oximetry Type Intermittent   $ Pulse Oximetry - Multiple Charge Pulse Oximetry - Multiple   Aerosol Therapy   $ Aerosol Therapy Charges PRN treatment not required

## 2018-02-26 NOTE — TELEPHONE ENCOUNTER
----- Message from Nadira Morillo sent at 2/26/2018  8:20 AM CST -----  Contact: Daughter Gloria   Patient was admitted to [facility] in [place] and Daughter needs to let Dr know so he can come see him at the hospital   Please call back [phone]

## 2018-02-26 NOTE — PLAN OF CARE
Patient reports independence at home, lives alone, pharmacy is [facility], PCP is Dr. Lakhani, daughter- Tonya Hunt [phone] has POA. Patient reports having living will but denies HH. Patient plans to return home with no needs.      02/26/18 1435   Discharge Assessment   Assessment Type Discharge Planning Assessment   Confirmed/corrected address and phone number on facesheet? Yes   Assessment information obtained from? Patient   Communicated expected length of stay with patient/caregiver yes   Prior to hospitilization cognitive status: Alert/Oriented   Prior to hospitalization functional status: Independent   Current cognitive status: Alert/Oriented   Current Functional Status: Independent   Lives With alone   Able to Return to Prior Arrangements yes   Is patient able to care for self after discharge? Yes   Patient's perception of discharge disposition home or selfcare   Readmission Within The Last 30 Days no previous admission in last 30 days   Patient currently being followed by outpatient case management? No   Patient currently receives any other outside agency services? No   Equipment Currently Used at Home none   Do you have any problems affording any of your prescribed medications? No   Is the patient taking medications as prescribed? yes   Does the patient have transportation home? Yes   Transportation Available family or friend will provide   Does the patient receive services at the [facility]mad[facility] Clinic? No   Discharge Plan A Home   Patient/Family In Agreement With Plan yes

## 2018-02-27 VITALS
OXYGEN SATURATION: 96 % | WEIGHT: 225 LBS | TEMPERATURE: 96 F | RESPIRATION RATE: 14 BRPM | SYSTOLIC BLOOD PRESSURE: 166 MMHG | DIASTOLIC BLOOD PRESSURE: 79 MMHG | HEIGHT: 73 IN | HEART RATE: 54 BPM | BODY MASS INDEX: 29.82 KG/M2

## 2018-02-27 LAB
ALBUMIN SERPL BCP-MCNC: 2.8 G/DL
ANION GAP SERPL CALC-SCNC: 6 MMOL/L
BASOPHILS # BLD AUTO: 0 K/UL
BASOPHILS NFR BLD: 0.5 %
BUN SERPL-MCNC: 36 MG/DL
CALCIUM SERPL-MCNC: 8.5 MG/DL
CHLORIDE SERPL-SCNC: 109 MMOL/L
CO2 SERPL-SCNC: 24 MMOL/L
CREAT SERPL-MCNC: 4.3 MG/DL
DIFFERENTIAL METHOD: ABNORMAL
EOSINOPHIL # BLD AUTO: 0.3 K/UL
EOSINOPHIL NFR BLD: 5.3 %
ERYTHROCYTE [DISTWIDTH] IN BLOOD BY AUTOMATED COUNT: 14.5 %
EST. GFR  (AFRICAN AMERICAN): 14 ML/MIN/1.73 M^2
EST. GFR  (NON AFRICAN AMERICAN): 12 ML/MIN/1.73 M^2
FERRITIN SERPL-MCNC: 204 NG/ML
GLUCOSE SERPL-MCNC: 81 MG/DL
HCT VFR BLD AUTO: 28 %
HGB BLD-MCNC: 9.3 G/DL
IRON SERPL-MCNC: 70 UG/DL
LYMPHOCYTES # BLD AUTO: 1.8 K/UL
LYMPHOCYTES NFR BLD: 35.6 %
MCH RBC QN AUTO: 30 PG
MCHC RBC AUTO-ENTMCNC: 33.3 G/DL
MCV RBC AUTO: 90 FL
MONOCYTES # BLD AUTO: 0.3 K/UL
MONOCYTES NFR BLD: 5.1 %
NEUTROPHILS # BLD AUTO: 2.8 K/UL
NEUTROPHILS NFR BLD: 53.5 %
PHOSPHATE SERPL-MCNC: 4.3 MG/DL
PLATELET # BLD AUTO: 122 K/UL
PMV BLD AUTO: 9.2 FL
POTASSIUM SERPL-SCNC: 4.7 MMOL/L
RBC # BLD AUTO: 3.11 M/UL
SATURATED IRON: 27 %
SODIUM SERPL-SCNC: 139 MMOL/L
TOTAL IRON BINDING CAPACITY: 256 UG/DL
TRANSFERRIN SERPL-MCNC: 173 MG/DL
TROPONIN I SERPL DL<=0.01 NG/ML-MCNC: 0.01 NG/ML
TROPONIN I SERPL DL<=0.01 NG/ML-MCNC: 0.02 NG/ML
WBC # BLD AUTO: 5.2 K/UL

## 2018-02-27 PROCEDURE — 80069 RENAL FUNCTION PANEL: CPT

## 2018-02-27 PROCEDURE — 84484 ASSAY OF TROPONIN QUANT: CPT

## 2018-02-27 PROCEDURE — 85025 COMPLETE CBC W/AUTO DIFF WBC: CPT

## 2018-02-27 PROCEDURE — 25000003 PHARM REV CODE 250: Performed by: NURSE PRACTITIONER

## 2018-02-27 PROCEDURE — 99223 1ST HOSP IP/OBS HIGH 75: CPT | Mod: ,,, | Performed by: INTERNAL MEDICINE

## 2018-02-27 PROCEDURE — S0030 INJECTION, METRONIDAZOLE: HCPCS | Performed by: HOSPITALIST

## 2018-02-27 PROCEDURE — 83540 ASSAY OF IRON: CPT

## 2018-02-27 PROCEDURE — 94761 N-INVAS EAR/PLS OXIMETRY MLT: CPT

## 2018-02-27 PROCEDURE — 36415 COLL VENOUS BLD VENIPUNCTURE: CPT

## 2018-02-27 PROCEDURE — 25000003 PHARM REV CODE 250: Performed by: HOSPITALIST

## 2018-02-27 PROCEDURE — 82728 ASSAY OF FERRITIN: CPT

## 2018-02-27 RX ORDER — CIPROFLOXACIN 500 MG/1
500 TABLET ORAL 2 TIMES DAILY
Qty: 10 TABLET | Refills: 0 | Status: SHIPPED | OUTPATIENT
Start: 2018-02-27 | End: 2018-03-04

## 2018-02-27 RX ORDER — METRONIDAZOLE 500 MG/1
500 TABLET ORAL 3 TIMES DAILY
Qty: 15 TABLET | Refills: 0 | Status: SHIPPED | OUTPATIENT
Start: 2018-02-27 | End: 2018-03-04

## 2018-02-27 RX ORDER — VALSARTAN 80 MG/1
160 TABLET ORAL DAILY
Status: DISCONTINUED | OUTPATIENT
Start: 2018-02-27 | End: 2018-02-27 | Stop reason: HOSPADM

## 2018-02-27 RX ADMIN — METRONIDAZOLE 500 MG: 500 INJECTION, SOLUTION INTRAVENOUS at 08:02

## 2018-02-27 RX ADMIN — CALCITRIOL 0.25 MCG: 0.25 CAPSULE, LIQUID FILLED ORAL at 08:02

## 2018-02-27 RX ADMIN — FINASTERIDE 5 MG: 5 TABLET, FILM COATED ORAL at 08:02

## 2018-02-27 RX ADMIN — ATORVASTATIN CALCIUM 80 MG: 40 TABLET, FILM COATED ORAL at 08:02

## 2018-02-27 RX ADMIN — SODIUM CHLORIDE: 0.9 INJECTION, SOLUTION INTRAVENOUS at 08:02

## 2018-02-27 RX ADMIN — METRONIDAZOLE 500 MG: 500 INJECTION, SOLUTION INTRAVENOUS at 12:02

## 2018-02-27 RX ADMIN — ASPIRIN 81 MG: 81 TABLET, COATED ORAL at 08:02

## 2018-02-27 RX ADMIN — VALSARTAN 160 MG: 80 TABLET ORAL at 10:02

## 2018-02-27 RX ADMIN — OXYCODONE HYDROCHLORIDE 10 MG: 10 TABLET ORAL at 08:02

## 2018-02-27 RX ADMIN — SODIUM CHLORIDE: 0.9 INJECTION, SOLUTION INTRAVENOUS at 12:02

## 2018-02-27 RX ADMIN — PANTOPRAZOLE SODIUM 40 MG: 40 TABLET, DELAYED RELEASE ORAL at 08:02

## 2018-02-27 NOTE — PROGRESS NOTES
INPATIENT NEPHROLOGY PROGRESS NOTE  [facility] NEPHROLOGY    Micheal Hunt  02/27/2018    Reason for consultation:     chente    Chief Complaint:   Chief Complaint   Patient presents with    Back Pain     bilat flank pain & bandlike lower abd vs. suprapubic pain - atraumatic (s/s x 3 days) - reports Hx of kidney problems    Flank Pain        History of Present Illness:      HPI: 77M with progressive non-diabetic CKD stage 4 (sCr a year ago was 2.5 = GFR in the 20's, more recently around 3-4 = eGFR in 10's) followed by me, has been complaining of abdominal and back pain with abdominal distention, gas and burping for few days.       2/26  Still with abdominal pain but improved today.  No vomiting. No bm.  No sob or chest pain.  Tolerating liquids    2/27  Feels a lot better.  abd pain gone.  Had reflux last pm, better today.  No sob, nausea, or cp      Plan of Care:     Assessment:   chente better  ckd IV -- today at baseline (last cr in clinic was 4.3)  Hyponatremia -- better  anemia    Plan:     1. Acute Kidney Injury- no nsaids, coxibs.  Pt advised to try to drink 32 ounces per day.  At baseline    2. Volume/Blood Pressure-euvolemic    3. Electrolytes/Acid Base-sodium better      5. Anemia of CKD-check iron stores.  NIC if replete    6. Medications-renal dose for crcl 10-50     Stable for d/c from renal standpoint    Thank you for allowing us to participate in this patient's care. We will continue to follow.    Medications:  No current facility-administered medications on file prior to encounter.      Current Outpatient Prescriptions on File Prior to Encounter   Medication Sig Dispense Refill    albuterol 90 mcg/actuation inhaler 2 puffs every 4 hours as needed for cough, wheeze, or shortness of breath 3 Inhaler 3    albuterol-ipratropium 2.5mg-0.5mg/3mL (DUO-NEB) 0.5 mg-3 mg(2.5 mg base)/3 mL nebulizer solution INHALE 1 VIAL BY NEBULIZER EVERY 6 HOURS AS NEEDED FOR WHEEZING 270 mL 0    allopurinol (ZYLOPRIM)  300 MG tablet Take 1 tablet (300 mg total) by mouth once daily. 90 tablet 3    amlodipine (NORVASC) 10 MG tablet Take 1 tablet (10 mg total) by mouth before dinner. 90 tablet 3    aspirin (ECOTRIN) 81 MG EC tablet Take 81 mg by mouth once daily.        atorvastatin (LIPITOR) 80 MG tablet Take 1 tablet (80 mg total) by mouth once daily. 90 tablet 3    budesonide-formoterol 160-4.5 mcg (SYMBICORT) 160-4.5 mcg/actuation HFAA Inhale 2 puffs into the lungs every 12 (twelve) hours. Controller 3 Inhaler 3    calcitRIOL (ROCALTROL) 0.25 MCG Cap       calcium-vitamin D 500-125 mg-unit tablet Take 1 tablet by mouth as needed.       carvedilol (COREG) 6.25 MG tablet TAKE 1 TABLET (6.25 MG TOTAL) BY MOUTH 2 (TWO) TIMES DAILY WITH MEALS. 90 tablet 3    cyanocobalamin, vitamin B-12, (VITAMIN B-12) 1,000 mcg Subl Take 1 tablet by mouth daily as needed. Takes 3,000mcg      finasteride (PROSCAR) 5 mg tablet TAKE 1 TABLET ONCE DAILY. 90 tablet 3    fluticasone (FLONASE) 50 mcg/actuation nasal spray 2 sprays (100 mcg total) by Each Nare route once daily. 3 Bottle 3    montelukast (SINGULAIR) 10 mg tablet Take 1 tablet (10 mg total) by mouth every evening. 90 tablet 3    nitroGLYCERIN (NITROSTAT) 0.4 MG SL tablet Place 1 tablet (0.4 mg total) under the tongue every 5 (five) minutes as needed for Chest pain. 25 tablet 4    predniSONE (DELTASONE) 20 MG tablet One daily for 3 days and repeat for flare of lung symptoms as intructed 12 tablet 0    sodium chloride (SALINE NASAL MIST) 3 % Mist 1 spray by Nasal route 2 (two) times daily.      terazosin (HYTRIN) 5 MG capsule Take 2 capsules (10 mg total) by mouth every evening. 90 capsule 3    tramadol (ULTRAM) 50 mg tablet Take 1 tablet (50 mg total) by mouth every 6 (six) hours as needed. 120 tablet 4    valsartan (DIOVAN) 160 MG tablet Take 1 tablet (160 mg total) by mouth once daily. 90 tablet 3    zolpidem (AMBIEN) 5 MG Tab Take 1 tablet (5 mg total) by mouth nightly as  needed. 30 tablet 3    [DISCONTINUED] diclofenac sodium (VOLTAREN) 1 % Gel Apply 2 g topically 2 (two) times daily. To knees 2 Tube 3     Scheduled Meds:   [START ON 2/28/2018] ascorbic acid (vitamin C)  500 mg Oral QHS    aspirin  81 mg Oral Daily    atorvastatin  80 mg Oral Daily    calcitRIOL  0.25 mcg Oral Daily    carvedilol  3.125 mg Oral BID WM    ciprofloxacin  400 mg Intravenous Q24H    diclofenac sodium  2 g Topical (Top) BID    [START ON 2/28/2018] ferrous sulfate  325 mg Oral BID WM    finasteride  5 mg Oral Daily    fluticasone  2 spray Each Nare Daily    fluticasone-vilanterol  1 puff Inhalation Daily    [START ON 2/28/2018] folic acid-vit B6-vit B12 2.5-25-2 mg  1 tablet Oral Daily    metronidazole  500 mg Intravenous Q8H    montelukast  10 mg Oral QHS    pantoprazole  40 mg Oral Daily    terazosin  10 mg Oral QHS     Continuous Infusions:   sodium chloride 0.9% 50 mL/hr at 02/27/18 0813     PRN Meds:.acetaminophen, acetaminophen, albuterol-ipratropium 2.5mg-0.5mg/3mL, dextrose 50%, dextrose 50%, famotidine, glucagon (human recombinant), glucose, glucose, HYDROmorphone, influenza, nitroGLYCERIN, ondansetron, oxyCODONE, ramelteon, simethicone    Allergies:  Ace inhibitors; Arb-angiotensin receptor antagonist; and Eplerenone    Vital Signs:  Temp Readings from Last 3 Encounters:   02/27/18 96.2 °F (35.7 °C) (Oral)   02/15/18 98.6 °F (37 °C) (Oral)   01/09/18 97.7 °F (36.5 °C) (Oral)       Pulse Readings from Last 3 Encounters:   02/27/18 (!) 55   02/21/18 60   02/15/18 61       BP Readings from Last 3 Encounters:   02/27/18 (!) 161/74   02/21/18 130/69   02/15/18 123/68       Weight:  Wt Readings from Last 3 Encounters:   02/25/18 102.1 kg (225 lb)   02/21/18 103.4 kg (227 lb 15.3 oz)   02/15/18 107 kg (235 lb 14.3 oz)       Review of Systems:  Review of Systems - All 14 systems reviewed and negative, except as noted in HPI    Physical Exam:    Constitutional: NAD  Neuro: No  asterixis  Psych: Calm  EENT: NCAT, anicteric sclera   Neck:  supple  Cards: No rub  Lungs: clear to ausculation  GI:  Pos bowel sounds, tender with palpation  MSK:  WNWD  Skin: no purpura, rashes       Results:  Lab Results   Component Value Date     02/27/2018    K 4.7 02/27/2018     02/27/2018    CO2 24 02/27/2018    BUN 36 (H) 02/27/2018    CREATININE 4.3 (H) 02/27/2018    CALCIUM 8.5 (L) 02/27/2018    ANIONGAP 6 (L) 02/27/2018    ESTGFRAFRICA 14 (A) 02/27/2018    EGFRNONAA 12 (A) 02/27/2018       Lab Results   Component Value Date    CALCIUM 8.5 (L) 02/27/2018    PHOS 4.3 02/27/2018         Recent Labs  Lab 02/27/18  0454   WBC 5.20   RBC 3.11*   HGB 9.3*   HCT 28.0*   *   MCV 90   MCH 30.0   MCHC 33.3       I have personally reviewed pertinent radiological imaging and reports.        Frankie Rooj MD  Nephrology  [facility] [place]  [phone]

## 2018-02-27 NOTE — HPI
"PCP: Dr. Lakhani  Renal: Dr. Rojo, last seen 1/10/2018  ENT: Dr. Nails  GI: Dr. Harris, Dr. Saleem  Physical medicine: Dr. Whitehead  Urology: Dr. Herring  Orthopedic: Dr. Fox in [place], [place], [phone], Dr. Álvarez  Lives alone, daughter, Tonya, on the same ranch, 20 acre, 4 horses, 35 chicken, 2 workers, prior commercial contractor, grandson, Uziel  Daughter, Crow, nutritionist in [employer]    78 y/o male with a h/o CAD, s/p 2 vessel CABG in 2007, HTN, Hypertensive left ventricular hypertrophy without heart failure, CKD stage 4, anemia, GRICELDA, vertigo, GERD, BPH, GERD and diverticulosis currently admitted to the hospital medicine service, receiving treatment for sigmoid diverticulosis and NICK after presenting to the ED on 02/25/2018 with a primary c/o a 3 day history of lower abdominal pain. Abdominal pain has since resolved.     Cardiology consultation was requested for chest pain evaluation.  Patient reports that he experienced an episode of CP last night with associated nausea and belching shorting after developing "gas" after consuming a new nutritional supplement drink (Novasource). Describes CP as a constant "ache" in the middle of his chest that lasted about an hour.  States that "Gas X cleared it up." Denies any further episodes of CP.  Patient reports having similar episodes of CP in the past after consuming dairy products. Denies associated diaphoresis, SOB, vomiting, back pain, lightheadedness, syncope.  Since being in the hospital, patient reports that he has ambulated some without difficulty. He is expressing that he would like to go home today.     No elevation in serial troponin or evidence of acute ischemic changes on EKG. Patient has been SB on telemetry with first degree AVB. HR mostly in 50s, low rate of 47 bpm. Home BB regimen of Coreg 6.25mg po bid on hold. Nephrology following for NICK on CKD stage 4. Home ARB regimen has been on hold. Patient states that his Valsartan dose was lowered to 160mg " about a month ago by his PCP. Creatinine now down to 4.3 which is at baseline.

## 2018-02-27 NOTE — DISCHARGE SUMMARY
[facility]  Discharge Summary      Patient Name: Micheal Hunt  MRN: [id number]  Admission Date: 2/25/2018  Hospital Length of Stay: 2 days  Discharge Date and Time:  02/27/2018 3:09 PM  Attending Physician: No att. providers found   Discharging Provider: ROSALBA Ta  Primary Care Provider: Pk Lakhani MD    HPI:   Micheal Hunt is a 77 y.o. male with a hx of Kidney disease stage 4, CAD, HTN, LVH, and GERD who presents to the ED with complaints of 10/10 suprapubic abd pain associated with bilateral flank pain and abd bloating x 3 days. He has not taken any pain medication. Pt denies nausea, vomiting, trouble urinating, CP , SOB and fever. Allergens include Ace inhibitors, Arb- angiotensin and Eplerenone. Patient was evaluated in ER. Ct scan showed acute, uncomplicated proximal sigmoid colonic diverticulitis and he was also noted to have NICK. Patient was admitted for further evaluation and treatment.    * No surgery found *      Hospital Course:   The patient was monitored closely during his stay. He received Ivf for hydration and a nephrology consult for his NICK with CKD stage 4. Patient continued on Iv flagyl and iv cipro for diverticulitis. He was initially given CL diet but diet was advanced as tolerated. The patient's GI status remained stable and his abdominal pain resolved. His NICK resolved. He was noted to have some complaints of chest pain and some noted bradycardia during his hospitalization. Troponins remained negative and an EKG was done and did not show any signs of significant St elevation or depression.  He received a cardiology consult with Dr. Dunlap. It was felt his chest pain was GI related. His home bblockade was discontinued due to the bradycardia. His ferritin, iron, TIBC levels remained WNL. Patient was noted to have chronic anemia felt to be related to his CKD Stage 4. He was continued on iron and b vitamins and his anemia remained  stable. His overall condition remained stable and improved and he was discharged to home. Patient was to follow up with GI for out patient consultation due to his bout of diverticulitis.     Consults:   Consults         Status Ordering Provider     Inpatient consult to Cardiology  Once     Provider:  Getachew Dunlap MD    Completed RAY DELAROSA        Service: Hospital Medicine    Final Active Diagnoses:    Diagnosis Date Noted POA    PRINCIPAL PROBLEM:  Sigmoid diverticulitis [K57.32] 02/25/2018 Yes    Anemia due to stage 4 chronic kidney disease [N18.4, D63.1] 02/25/2018 Yes    Thrombocytopenia [D69.6] 02/25/2018 Yes    Renal cyst [N28.1] 02/25/2018 Not Applicable    Unilateral inguinal hernia [K40.90] 02/25/2018 Yes    Back pain, chronic [M54.9, G89.29] 03/17/2014 Yes    Diastolic dysfunction [I51.9] 12/10/2013 Yes    GERD (gastroesophageal reflux disease) [K21.9] 06/11/2013 Yes    CAD (coronary artery disease), 2V CABG 2007 [I25.10]  Yes    Benign essential HTN [I10] 03/27/2012 Yes    Hyperlipidemia, baseline  [E78.5] 03/27/2012 Yes    BPH (benign prostatic hyperplasia) [N40.0] 02/16/2012 Yes      Problems Resolved During this Admission:    Diagnosis Date Noted Date Resolved POA    NICK (acute kidney injury) [N17.9] 07/29/2016 02/27/2018 Yes       Discharged Condition: stable    Disposition: Home or Self Care    Follow Up:  Follow-up Information     Messi Harris MD In 3 weeks.    Specialty:  Gastroenterology  Why:  Follow up for out patinet GI evaluation and colonoscopy  Contact information:  [address] [facility]  [address]  [place] [address]  [phone]             Pk Lakhani MD In 2 weeks.    Specialty:  Family Medicine  Contact information:  [address]  [place] [address]  [phone]                 Patient Instructions:     Diet Cardiac     Diet renal     Notify your health care provider if you experience any of the following:  temperature >100.4     Notify your health care  provider if you experience any of the following:  persistent nausea and vomiting or diarrhea     Notify your health care provider if you experience any of the following:  severe uncontrolled pain       Significant Diagnostic Studies:     CT Abdomen Pelvis  Without Contrast-  1.  Acute, uncomplicated proximal sigmoid colonic diverticulitis. There is mural thickening in this location which is likely secondary to diverticular disease but injuring that this patient is up-to-date on routine colorectal cancer screening is recommended, given that a colonic mass could have a similar appearance.    2. Otherwise, no acute findings are noted. Additional findings:  -diffuse atherosclerosis including coronary artery disease and prior CABG  -Multiple bilateral renal masses including simple and hyperdense cysts, stable dating back to 1/15/16  -Moderate prostatomegaly  -Fat-containing, left indirect inguinal hernia. Mild tethering of the left anterior bladder wall in this location.    Cxr- Stable cardiomegaly and aortic tortuosity. Changes of prior CABG. No acute radiographic findings.    Labs:   CMP   Recent Labs  Lab 02/26/18  0527 02/27/18  0454    139   K 4.6 4.7    109   CO2 24 24   GLU 90 81   BUN 40* 36*   CREATININE 4.3* 4.3*   CALCIUM 8.3* 8.5*   ALBUMIN  --  2.8*   ANIONGAP 7* 6*   ESTGFRAFRICA 14* 14*   EGFRNONAA 12* 12*    and CBC   Recent Labs  Lab 02/26/18  0528 02/27/18  0454   WBC 5.70 5.20   HGB 9.1* 9.3*   HCT 27.5* 28.0*   * 122*       Pending Diagnostic Studies:     None         Medications:  Reconciled Home Medications:   Discharge Medication List as of 2/27/2018 12:15 PM      START taking these medications    Details   ciprofloxacin HCl (CIPRO) 500 MG tablet Take 1 tablet (500 mg total) by mouth 2 (two) times daily., Starting Tue 2/27/2018, Until Sun 3/4/2018, Normal      metroNIDAZOLE (FLAGYL) 500 MG tablet Take 1 tablet (500 mg total) by mouth 3 (three) times daily., Starting Tue  2/27/2018, Until Sun 3/4/2018, Normal         CONTINUE these medications which have NOT CHANGED    Details   albuterol 90 mcg/actuation inhaler 2 puffs every 4 hours as needed for cough, wheeze, or shortness of breath, Normal      albuterol-ipratropium 2.5mg-0.5mg/3mL (DUO-NEB) 0.5 mg-3 mg(2.5 mg base)/3 mL nebulizer solution INHALE 1 VIAL BY NEBULIZER EVERY 6 HOURS AS NEEDED FOR WHEEZING, Normal      allopurinol (ZYLOPRIM) 300 MG tablet Take 1 tablet (300 mg total) by mouth once daily., Starting 4/25/2017, Until Discontinued, Normal      amlodipine (NORVASC) 10 MG tablet Take 1 tablet (10 mg total) by mouth before dinner., Starting 3/24/2017, Until Discontinued, Normal      aspirin (ECOTRIN) 81 MG EC tablet Take 81 mg by mouth once daily.  , Until Discontinued, Historical Med      atorvastatin (LIPITOR) 80 MG tablet Take 1 tablet (80 mg total) by mouth once daily., Starting 4/8/2017, Until Discontinued, Normal      budesonide-formoterol 160-4.5 mcg (SYMBICORT) 160-4.5 mcg/actuation HFAA Inhale 2 puffs into the lungs every 12 (twelve) hours. Controller, Starting Wed 2/21/2018, Until Thu 2/21/2019, Normal      calcitRIOL (ROCALTROL) 0.25 MCG Cap Starting Thu 7/13/2017, Historical Med      calcium-vitamin D 500-125 mg-unit tablet Take 1 tablet by mouth as needed. , Until Discontinued, Historical Med      carvedilol (COREG) 6.25 MG tablet TAKE 1 TABLET (6.25 MG TOTAL) BY MOUTH 2 (TWO) TIMES DAILY WITH MEALS., Starting Sun 11/12/2017, Until Mon 11/12/2018, Normal      cyanocobalamin, vitamin B-12, (VITAMIN B-12) 1,000 mcg Subl Take 1 tablet by mouth daily as needed. Takes 3,000mcg, Until Discontinued, Historical Med      finasteride (PROSCAR) 5 mg tablet TAKE 1 TABLET ONCE DAILY., Normal      fluticasone (FLONASE) 50 mcg/actuation nasal spray 2 sprays (100 mcg total) by Each Nare route once daily., Starting Wed 2/21/2018, Normal      montelukast (SINGULAIR) 10 mg tablet Take 1 tablet (10 mg total) by mouth every  evening., Starting Wed 2/21/2018, Normal      nitroGLYCERIN (NITROSTAT) 0.4 MG SL tablet Place 1 tablet (0.4 mg total) under the tongue every 5 (five) minutes as needed for Chest pain., Starting Mon 3/14/2016, Until Mon 1/29/2018, Normal      sodium chloride (SALINE NASAL MIST) 3 % Mist 1 spray by Nasal route 2 (two) times daily., Starting Mon 9/25/2017, OTC      terazosin (HYTRIN) 5 MG capsule Take 2 capsules (10 mg total) by mouth every evening., Starting Wed 9/13/2017, Until Thu 9/13/2018, No Print      tramadol (ULTRAM) 50 mg tablet Take 1 tablet (50 mg total) by mouth every 6 (six) hours as needed., Starting 4/26/2017, Until Discontinued, Normal      valsartan (DIOVAN) 160 MG tablet Take 1 tablet (160 mg total) by mouth once daily., Starting Thu 2/15/2018, Until Fri 2/15/2019, Normal      zolpidem (AMBIEN) 5 MG Tab Take 1 tablet (5 mg total) by mouth nightly as needed., Starting Wed 9/13/2017, Until Wed 3/14/2018, Normal         STOP taking these medications       diclofenac sodium (VOLTAREN) 1 % Gel Comments:   Reason for Stopping:         predniSONE (DELTASONE) 20 MG tablet Comments:   Reason for Stopping:               Indwelling Lines/Drains at time of discharge:   Lines/Drains/Airways     Line                 Peripheral IV (Ped) 02/25/18 1522 Forearm 1 day              Time spent on the discharge of patient: 52 minutes  Patient was seen and examined on the date of discharge and determined to be suitable for discharge.    Rere Serna, ROSALBA  Department of Hospital Medicine  [facility]

## 2018-02-27 NOTE — PLAN OF CARE
"Hourly rounding to ensure pt. Needs met  Tele started overnight. Pt. Has been bradycardic with  frequent PVC's/PAC's with a 1st degree AV block. Inverted T-wave noted.  No further complaints of chest pain. It was resolved with nitro x1 dose. When asked about it later in the evening he stated "I never had chest pain."     Receiving IVF and antibiotics.  No BM over night. Uses urinal.  Consult ordered for Cardiology. Pt has seen Dr. Dunlap in the past    Remains free from injury. Call light in reach. Bed low and locked.  "

## 2018-02-27 NOTE — CONSULTS
"[facility]  Cardiology  Consult Note    Patient Name: Micheal Hunt  MRN: [id number]  Admission Date: 2/25/2018  Hospital Length of Stay: 2 days  Code Status: Full Code   Attending Provider: Tim Arce MD   Consulting Provider: Constance Archibald NP  Primary Care Physician: Pk Lakhani MD  Principal Problem:Sigmoid diverticulitis    Patient information was obtained from patient, medical record, and primary team members.     Inpatient consult to Cardiology  Consult performed by: CONSTANCE ARCHIBALD  Consult ordered by: RAY DELAROSA  Reason for consult: chest pain      This patient is known to our group.   Subjective:      PCP: Dr. Lakhani  Renal: Dr. Rojo, last seen 1/10/2018  ENT: Dr. Nails  GI: Dr. Harris, Dr. Saleem  Physical medicine: Dr. Whitehead  Urology: Dr. Herring  Orthopedic: Dr. Fox in [place], [place], [phone], Dr. Álvarez  Lives alone, daughter, Tonya, on the same ranch, [place] acre, 4 horses, 35 chicken, 2 workers, prior commercial contractor, grandson, Uziel  Daughter, Crow, nutritionist in [facility]    Chief Complaint:  Abdominal pain     HPI:  78 y/o male with a h/o CAD, s/p 2 vessel CABG in 2007, HTN, Hypertensive left ventricular hypertrophy without heart failure, CKD stage 4, anemia, GRICELDA, vertigo, GERD, BPH, GERD and diverticulosis currently admitted to the hospital medicine service, receiving treatment for sigmoid diverticulosis and NICK after presenting to the ED on 02/25/2018 with a primary c/o a 3 day history of lower abdominal pain. Abdominal pain has since resolved.     Cardiology consultation was requested for chest pain evaluation.  Patient reports that he experienced an episode of CP last night with associated nausea and belching shorting after developing "gas" after consuming a new nutritional supplement drink (Novasource). Describes CP as a constant "ache" in the middle of his chest that lasted about an hour.  States that "Gas X cleared it up." Denies any further " episodes of CP.  Patient reports having similar episodes of CP in the past after consuming dairy products. Denies associated diaphoresis, SOB, vomiting, back pain, lightheadedness, syncope.  Since being in the hospital, patient reports that he has ambulated some without difficulty. He is expressing that he would like to go home today.     No elevation in serial troponin or evidence of acute ischemic changes on EKG. Patient has been SB on telemetry with first degree AVB. HR mostly in 50s, low rate of 47 bpm. Home BB regimen of Coreg 6.25mg po bid on hold. Nephrology following for NICK on CKD stage 4. Home ARB regimen has been on hold. Patient states that his Valsartan dose was lowered to 160mg about a month ago by his PCP. Creatinine now down to 4.3 which is at baseline.     Past Medical History:   Diagnosis Date    Allergy     Arthritis     Gout    BPH (benign prostatic hyperplasia)     CAD (coronary artery disease) 2006    Chronic kidney disease     due to ibuprofen    Colon polyp     Diverticulosis     GERD (gastroesophageal reflux disease)     HTN (hypertension) 3/27/2012    Hyperlipidemia     LVH (left ventricular hypertrophy)     Murmur, cardiac 3/27/2012    GRICELDA (obstructive sleep apnea)     DOES NOT USE A MACHINE    Sinus problem     Syncope and collapse        Past Surgical History:   Procedure Laterality Date    APPENDECTOMY      CARDIAC CATHETERIZATION      COLONOSCOPY  2011    CORONARY ARTERY BYPASS GRAFT  4/2007    x 1    JOINT REPLACEMENT      left knee total replacement  X 3    mid leftt finger      from a cactuss    MOLE REMOVAL  2016    rotative cuff      no rotative cuffs on bilat shoulders has pins     SHOULDER SURGERY      shoulder surgery bilat  RIGHT X 4; LEFT X 3       Review of patient's allergies indicates:   Allergen Reactions    Ace inhibitors Other (See Comments)     Cough    Arb-angiotensin receptor antagonist Itching    Eplerenone Other (See Comments)     Marked  bradycardia, 40, tiredness and weakness         No current facility-administered medications on file prior to encounter.      Current Outpatient Prescriptions on File Prior to Encounter   Medication Sig    albuterol 90 mcg/actuation inhaler 2 puffs every 4 hours as needed for cough, wheeze, or shortness of breath    albuterol-ipratropium 2.5mg-0.5mg/3mL (DUO-NEB) 0.5 mg-3 mg(2.5 mg base)/3 mL nebulizer solution INHALE 1 VIAL BY NEBULIZER EVERY 6 HOURS AS NEEDED FOR WHEEZING    allopurinol (ZYLOPRIM) 300 MG tablet Take 1 tablet (300 mg total) by mouth once daily.    amlodipine (NORVASC) 10 MG tablet Take 1 tablet (10 mg total) by mouth before dinner.    aspirin (ECOTRIN) 81 MG EC tablet Take 81 mg by mouth once daily.      atorvastatin (LIPITOR) 80 MG tablet Take 1 tablet (80 mg total) by mouth once daily.    budesonide-formoterol 160-4.5 mcg (SYMBICORT) 160-4.5 mcg/actuation HFAA Inhale 2 puffs into the lungs every 12 (twelve) hours. Controller    calcitRIOL (ROCALTROL) 0.25 MCG Cap     calcium-vitamin D 500-125 mg-unit tablet Take 1 tablet by mouth as needed.     carvedilol (COREG) 6.25 MG tablet TAKE 1 TABLET (6.25 MG TOTAL) BY MOUTH 2 (TWO) TIMES DAILY WITH MEALS.    cyanocobalamin, vitamin B-12, (VITAMIN B-12) 1,000 mcg Subl Take 1 tablet by mouth daily as needed. Takes 3,000mcg    finasteride (PROSCAR) 5 mg tablet TAKE 1 TABLET ONCE DAILY.    fluticasone (FLONASE) 50 mcg/actuation nasal spray 2 sprays (100 mcg total) by Each Nare route once daily.    montelukast (SINGULAIR) 10 mg tablet Take 1 tablet (10 mg total) by mouth every evening.    nitroGLYCERIN (NITROSTAT) 0.4 MG SL tablet Place 1 tablet (0.4 mg total) under the tongue every 5 (five) minutes as needed for Chest pain.    predniSONE (DELTASONE) 20 MG tablet One daily for 3 days and repeat for flare of lung symptoms as intructed    sodium chloride (SALINE NASAL MIST) 3 % Mist 1 spray by Nasal route 2 (two) times daily.    terazosin  "(HYTRIN) 5 MG capsule Take 2 capsules (10 mg total) by mouth every evening.    tramadol (ULTRAM) 50 mg tablet Take 1 tablet (50 mg total) by mouth every 6 (six) hours as needed.    valsartan (DIOVAN) 160 MG tablet Take 1 tablet (160 mg total) by mouth once daily.    zolpidem (AMBIEN) 5 MG Tab Take 1 tablet (5 mg total) by mouth nightly as needed.    [DISCONTINUED] diclofenac sodium (VOLTAREN) 1 % Gel Apply 2 g topically 2 (two) times daily. To knees     Family History     Problem Relation (Age of Onset)    Heart disease Mother, Sister    Stroke Sister    Sudden death Father        Social History Main Topics    Smoking status: Never Smoker    Smokeless tobacco: Never Used    Alcohol use Yes      Comment: rare    Drug use: No    Sexual activity: Not on file     Review of Systems   Constitution: Negative for chills, decreased appetite, diaphoresis, fever, weakness, malaise/fatigue and weight gain.   HENT: Negative for congestion and sore throat.    Eyes: Negative for visual disturbance.   Cardiovascular: Positive for chest pain ("gas pain"). Negative for dyspnea on exertion, irregular heartbeat, leg swelling, near-syncope, orthopnea, palpitations and syncope.   Respiratory: Negative for cough, hemoptysis, shortness of breath and wheezing.    Endocrine: Negative for cold intolerance, heat intolerance, polydipsia, polyphagia and polyuria.   Hematologic/Lymphatic: Does not bruise/bleed easily.   Skin: Negative for color change and rash.   Musculoskeletal: Negative for arthritis, back pain and joint swelling.   Gastrointestinal: Positive for abdominal pain (resolved) and nausea (resolved). Negative for constipation, diarrhea, melena and vomiting.        "gas pain and belching"   Genitourinary: Negative for dysuria and hematuria.        Denies change in urine volume   Neurological: Negative for dizziness, focal weakness, light-headedness, numbness and paresthesias.   Psychiatric/Behavioral: Negative for depression. "     Objective:     Vital Signs (Most Recent):  Temp: 96.2 °F (35.7 °C) (02/27/18 0726)  Pulse: (!) 55 (02/27/18 0726)  Resp: 14 (02/27/18 0726)  BP: (!) 161/74 (02/27/18 0726)  SpO2: 95 % (02/27/18 0726) Vital Signs (24h Range):  Temp:  [96.2 °F (35.7 °C)-98.2 °F (36.8 °C)] 96.2 °F (35.7 °C)  Pulse:  [47-56] 55  Resp:  [14-18] 14  SpO2:  [93 %-99 %] 95 %  BP: (121-169)/(69-77) 161/74     Weight: 102.1 kg (225 lb)  Body mass index is 29.69 kg/m².    SpO2: 95 %  O2 Device (Oxygen Therapy): room air      Intake/Output Summary (Last 24 hours) at 02/27/18 0943  Last data filed at 02/27/18 0600   Gross per 24 hour   Intake          3513.75 ml   Output             2900 ml   Net           613.75 ml       Lines/Drains/Airways     Peripheral Intravenous Line                 Peripheral IV (Ped) 02/25/18 1522 Forearm 1 day                Physical Exam   Constitutional: He is oriented to person, place, and time. He appears well-developed and well-nourished. No distress.   HENT:   Head: Normocephalic and atraumatic.   Eyes: Conjunctivae and EOM are normal. Right eye exhibits no discharge. Left eye exhibits no discharge. No scleral icterus.   Neck: Normal range of motion. No JVD present.   Cardiovascular: Normal rate and regular rhythm.  Exam reveals no gallop and no friction rub.    Murmur heard.  Pulses:       Radial pulses are 2+ on the right side, and 2+ on the left side.        Dorsalis pedis pulses are 2+ on the right side, and 2+ on the left side.   Pulmonary/Chest: Effort normal and breath sounds normal. He has no wheezes. He has no rales. He exhibits no tenderness.   Abdominal: Soft. Bowel sounds are normal. There is no tenderness. There is no guarding.   Musculoskeletal: Normal range of motion.   No evidence of LE pitting edema.    Neurological: He is alert and oriented to person, place, and time.   Follows commands, moves all extremities.    Skin: Skin is warm and dry. He is not diaphoretic. No cyanosis. Nails show no  clubbing.   Psychiatric: He has a normal mood and affect. His behavior is normal.   Vitals reviewed.      Significant Labs:   BMP:   Recent Labs  Lab 02/25/18  1202 02/26/18  0527 02/27/18  0454   * 90 81    137 139   K 3.8 4.6 4.7    106 109   CO2 25 24 24   BUN 40* 40* 36*   CREATININE 4.7* 4.3* 4.3*   CALCIUM 8.8 8.3* 8.5*   MG  --  2.4  --    , CMP   Recent Labs  Lab 02/25/18  1202 02/26/18  0527 02/27/18  0454    137 139   K 3.8 4.6 4.7    106 109   CO2 25 24 24   * 90 81   BUN 40* 40* 36*   CREATININE 4.7* 4.3* 4.3*   CALCIUM 8.8 8.3* 8.5*   PROT 6.6  --   --    ALBUMIN 3.2*  --  2.8*   BILITOT 0.4  --   --    ALKPHOS 86  --   --    AST 13  --   --    ALT 13  --   --    ANIONGAP 10 7* 6*   ESTGFRAFRICA 13* 14* 14*   EGFRNONAA 11* 12* 12*   , CBC   Recent Labs  Lab 02/25/18  1202 02/26/18  0528 02/27/18  0454   WBC 6.90 5.70 5.20   HGB 9.9* 9.1* 9.3*   HCT 30.1* 27.5* 28.0*   * 121* 122*   , INR No results for input(s): INR, PROTIME in the last 48 hours., Lipid Panel No results for input(s): CHOL, HDL, LDLCALC, TRIG, CHOLHDL in the last 48 hours. and Troponin   Recent Labs  Lab 02/26/18 2038 02/26/18  2344 02/27/18  0454   TROPONINI 0.015 0.011 0.019       Significant Imaging:   CT Abdomen and Pelvis without contrast 02/25/2018:  1.  Acute, uncomplicated proximal sigmoid colonic diverticulitis. There is mural thickening in this location which is likely secondary to diverticular disease but injuring that this patient is up-to-date on routine colorectal cancer screening is recommended, given that a colonic mass could have a similar appearance.    2. Otherwise, no acute findings are noted. Additional findings:  -diffuse atherosclerosis including coronary artery disease and prior CABG  -Multiple bilateral renal masses including simple and hyperdense cysts, stable dating back to 1/15/16  -Moderate prostatomegaly  -Fat-containing, left indirect inguinal hernia. Mild  tethering of the left anterior bladder wall in this location.    CXR 02/25/2018:  1.  Stable cardiomegaly and aortic tortuosity. Changes of prior CABG. No acute radiographic findings.    Prior Echo 04/2017:  CONCLUSIONS     1 - Mildly enlarged aortic root.     2 - Biatrial enlargement.     3 - Concentric hypertrophy.     4 - No wall motion abnormalities.     5 - Normal left ventricular systolic function (EF 55-60%).     6 - Mild aortic regurgitation.     7 - Mild mitral regurgitation.     8 - Indeterminate LV diastolic function.     9 - Normal right ventricular systolic function .     10 - The estimated PA systolic pressure is greater than 25 mmHg.     11 - Suggest some improvement in LVEF from Echo in 3/2016.     Prior Nuclear stress test/Lexiscan 03/2016:  Nuclear Quantitative Functional Analysis:   LVEF: 34 % (normal is 47 - 59)  LVED Volume: 192 ml (normal is 91 - 155)  LVES Volume: 126 ml (normal is 40 - 78)    Impression: ABNORMAL MYOCARDIAL PERFUSION    Prior [facility] 05/2012:  B. HEMODYNAMIC RESULTS:    LVEDP (Pre): 14 mmHg  LVEDP (Post): 11 mmHg  Ejection Fraction: 50%  Global LV Function: normal    Air rest:  AO: 149/78 (106)  LV: 145  LVEDP: 14    C. ANGIOGRAPHIC RESULTS:    DIAGNOSTIC:       Patient has a right dominant coronary artery.        - Left Main Coronary Artery:             The LM is occluded. There is JOURDAN 0 flow.       - Left Anterior Descending Artery:             The LAD has luminal irregularities. There is JOURDAN 3 flow. Fed from the LIMA graft       - Left Circumflex Artery:             The LCX was not found.       - Right Coronary Artery:             The RCA has luminal irregularities. There is JOURDAN 3 flow. appear to be fed from SVG, could not cannulate, poorly visualized.       - Aortic Root:             The Aortic Root is normal. There is JOURDAN 3 flow.                     Lesion Details: Moderate proximal tortuosity, mild to moderate calcification.       - HERRON To LAD:             The LIMA to  "LAD is normal. There is JOURDAN 3 flow.      D. SUMMARY:  1. Three vessel coronary artery disease.  2. Normal LVEF.  3. Mild aortic insufficiency.  4. Very difficult study  5. Multiple attempts to cannulate SVG which appears to go to the RCA. Native RCA appears to have an ostial occlusion.  1. There is evidence for moderate myocardial ischemia in the inferolateral wall of the left ventricle with associated stress induced LV cavity dilatation with underlying injury present.   2. There is abnormal wall motion at rest showing moderate global hypokinesis of the left ventricle. severe hypokinesis of the inferolateral wall of the left ventricle.   3. There is resting LV dysfunction with a reduced ejection fraction of 34 %.  (normal is 47 - 59)  4. The left ventricular volume is mildly increased at rest.   5. The extracardiac distribution of radioactivity is normal.   6. When compared to the previous study from 12/19/2013, current study indicate inferolateral defects.    Assessment and Plan:     No notes have been filed under this hospital service.  Service: Cardiology      VTE Risk Mitigation         Ordered     Medium Risk of VTE  Once      02/25/18 1429     Place sequential compression device  Until discontinued      02/25/18 1429     Place LOYD hose  Until discontinued      02/25/18 1429        Admitted with sigmoid diverticulitis after presenting with an initial c/o abdominal pain. Had episode of chest pain ("gas pain") last night.   Chest pain seems to have GI component. No elevation in serial troponin or evidence of acute ischemic changes on EKG.  SB with first degree AVB on telemetry. HR mostly in 50s, low of 47 bpm. BB (coreg) on hold.  Nephrology following for NICK on CKD stage 4. Home ARB regimen has been on hold. Creatinine now down to 4.3 which is at baseline.     - Continue home regimen of ASA, Lipitor, and Norvasc  - Leave BB on hold   - ARB (valsartan) resumed. States has an a nephrology appt next week with with " Dr. Rojo.   - Low sodium diet  - Increase activity     No further recommendations or workup at this time from a cardiology standpoint. Thank you for this consultation and allowing us to participate in Mr. Hunt's care. Please call prn with any questions. This patient has been discussed with and evaluated by my collaborating physician, Dr. Dunlap.  Please see Dr. Dunlap's MD attestation above for additional information/recommendations.       Constance Archibald NP  Cardiology   [facility]

## 2018-02-27 NOTE — SUBJECTIVE & OBJECTIVE
Past Medical History:   Diagnosis Date    Allergy     Arthritis     Gout    BPH (benign prostatic hyperplasia)     CAD (coronary artery disease) 2006    Chronic kidney disease     due to ibuprofen    Colon polyp     Diverticulosis     GERD (gastroesophageal reflux disease)     HTN (hypertension) 3/27/2012    Hyperlipidemia     LVH (left ventricular hypertrophy)     Murmur, cardiac 3/27/2012    GRICELDA (obstructive sleep apnea)     DOES NOT USE A MACHINE    Sinus problem     Syncope and collapse        Past Surgical History:   Procedure Laterality Date    APPENDECTOMY      CARDIAC CATHETERIZATION      COLONOSCOPY  2011    CORONARY ARTERY BYPASS GRAFT  4/2007    x 1    JOINT REPLACEMENT      left knee total replacement  X 3    mid leftt finger      from a cactuss    MOLE REMOVAL  2016    rotative cuff      no rotative cuffs on bilat shoulders has pins     SHOULDER SURGERY      shoulder surgery bilat  RIGHT X 4; LEFT X 3       Review of patient's allergies indicates:   Allergen Reactions    Ace inhibitors Other (See Comments)     Cough    Arb-angiotensin receptor antagonist Itching    Eplerenone Other (See Comments)     Marked bradycardia, 40, tiredness and weakness         No current facility-administered medications on file prior to encounter.      Current Outpatient Prescriptions on File Prior to Encounter   Medication Sig    albuterol 90 mcg/actuation inhaler 2 puffs every 4 hours as needed for cough, wheeze, or shortness of breath    albuterol-ipratropium 2.5mg-0.5mg/3mL (DUO-NEB) 0.5 mg-3 mg(2.5 mg base)/3 mL nebulizer solution INHALE 1 VIAL BY NEBULIZER EVERY 6 HOURS AS NEEDED FOR WHEEZING    allopurinol (ZYLOPRIM) 300 MG tablet Take 1 tablet (300 mg total) by mouth once daily.    amlodipine (NORVASC) 10 MG tablet Take 1 tablet (10 mg total) by mouth before dinner.    aspirin (ECOTRIN) 81 MG EC tablet Take 81 mg by mouth once daily.      atorvastatin (LIPITOR) 80 MG tablet Take 1  tablet (80 mg total) by mouth once daily.    budesonide-formoterol 160-4.5 mcg (SYMBICORT) 160-4.5 mcg/actuation HFAA Inhale 2 puffs into the lungs every 12 (twelve) hours. Controller    calcitRIOL (ROCALTROL) 0.25 MCG Cap     calcium-vitamin D 500-125 mg-unit tablet Take 1 tablet by mouth as needed.     carvedilol (COREG) 6.25 MG tablet TAKE 1 TABLET (6.25 MG TOTAL) BY MOUTH 2 (TWO) TIMES DAILY WITH MEALS.    cyanocobalamin, vitamin B-12, (VITAMIN B-12) 1,000 mcg Subl Take 1 tablet by mouth daily as needed. Takes 3,000mcg    finasteride (PROSCAR) 5 mg tablet TAKE 1 TABLET ONCE DAILY.    fluticasone (FLONASE) 50 mcg/actuation nasal spray 2 sprays (100 mcg total) by Each Nare route once daily.    montelukast (SINGULAIR) 10 mg tablet Take 1 tablet (10 mg total) by mouth every evening.    nitroGLYCERIN (NITROSTAT) 0.4 MG SL tablet Place 1 tablet (0.4 mg total) under the tongue every 5 (five) minutes as needed for Chest pain.    predniSONE (DELTASONE) 20 MG tablet One daily for 3 days and repeat for flare of lung symptoms as intructed    sodium chloride (SALINE NASAL MIST) 3 % Mist 1 spray by Nasal route 2 (two) times daily.    terazosin (HYTRIN) 5 MG capsule Take 2 capsules (10 mg total) by mouth every evening.    tramadol (ULTRAM) 50 mg tablet Take 1 tablet (50 mg total) by mouth every 6 (six) hours as needed.    valsartan (DIOVAN) 160 MG tablet Take 1 tablet (160 mg total) by mouth once daily.    zolpidem (AMBIEN) 5 MG Tab Take 1 tablet (5 mg total) by mouth nightly as needed.    [DISCONTINUED] diclofenac sodium (VOLTAREN) 1 % Gel Apply 2 g topically 2 (two) times daily. To knees     Family History     Problem Relation (Age of Onset)    Heart disease Mother, Sister    Stroke Sister    Sudden death Father        Social History Main Topics    Smoking status: Never Smoker    Smokeless tobacco: Never Used    Alcohol use Yes      Comment: rare    Drug use: No    Sexual activity: Not on file     Review  "of Systems   Constitution: Negative for chills, decreased appetite, diaphoresis, fever, weakness, malaise/fatigue and weight gain.   HENT: Negative for congestion and sore throat.    Eyes: Negative for visual disturbance.   Cardiovascular: Positive for chest pain ("gas pain"). Negative for dyspnea on exertion, irregular heartbeat, leg swelling, near-syncope, orthopnea, palpitations and syncope.   Respiratory: Negative for cough, hemoptysis, shortness of breath and wheezing.    Endocrine: Negative for cold intolerance, heat intolerance, polydipsia, polyphagia and polyuria.   Hematologic/Lymphatic: Does not bruise/bleed easily.   Skin: Negative for color change and rash.   Musculoskeletal: Negative for arthritis, back pain and joint swelling.   Gastrointestinal: Positive for abdominal pain (resolved) and nausea (resolved). Negative for constipation, diarrhea, melena and vomiting.        "gas pain and belching"   Genitourinary: Negative for dysuria and hematuria.        Denies change in urine volume   Neurological: Negative for dizziness, focal weakness, light-headedness, numbness and paresthesias.   Psychiatric/Behavioral: Negative for depression.     Objective:     Vital Signs (Most Recent):  Temp: 96.2 °F (35.7 °C) (02/27/18 0726)  Pulse: (!) 55 (02/27/18 0726)  Resp: 14 (02/27/18 0726)  BP: (!) 161/74 (02/27/18 0726)  SpO2: 95 % (02/27/18 0726) Vital Signs (24h Range):  Temp:  [96.2 °F (35.7 °C)-98.2 °F (36.8 °C)] 96.2 °F (35.7 °C)  Pulse:  [47-56] 55  Resp:  [14-18] 14  SpO2:  [93 %-99 %] 95 %  BP: (121-169)/(69-77) 161/74     Weight: 102.1 kg (225 lb)  Body mass index is 29.69 kg/m².    SpO2: 95 %  O2 Device (Oxygen Therapy): room air      Intake/Output Summary (Last 24 hours) at 02/27/18 0943  Last data filed at 02/27/18 0600   Gross per 24 hour   Intake          3513.75 ml   Output             2900 ml   Net           613.75 ml       Lines/Drains/Airways     Peripheral Intravenous Line                 Peripheral " IV (Ped) 02/25/18 1522 Forearm 1 day                Physical Exam   Constitutional: He is oriented to person, place, and time. He appears well-developed and well-nourished. No distress.   HENT:   Head: Normocephalic and atraumatic.   Eyes: Conjunctivae and EOM are normal. Right eye exhibits no discharge. Left eye exhibits no discharge. No scleral icterus.   Neck: Normal range of motion. No JVD present.   Cardiovascular: Normal rate and regular rhythm.  Exam reveals no gallop and no friction rub.    Murmur heard.  Pulses:       Radial pulses are 2+ on the right side, and 2+ on the left side.        Dorsalis pedis pulses are 2+ on the right side, and 2+ on the left side.   Pulmonary/Chest: Effort normal and breath sounds normal. He has no wheezes. He has no rales. He exhibits no tenderness.   Abdominal: Soft. Bowel sounds are normal. There is no tenderness. There is no guarding.   Musculoskeletal: Normal range of motion.   No evidence of LE pitting edema.    Neurological: He is alert and oriented to person, place, and time.   Follows commands, moves all extremities.    Skin: Skin is warm and dry. He is not diaphoretic. No cyanosis. Nails show no clubbing.   Psychiatric: He has a normal mood and affect. His behavior is normal.   Vitals reviewed.      Significant Labs:   BMP:   Recent Labs  Lab 02/25/18  1202 02/26/18  0527 02/27/18  0454   * 90 81    137 139   K 3.8 4.6 4.7    106 109   CO2 25 24 24   BUN 40* 40* 36*   CREATININE 4.7* 4.3* 4.3*   CALCIUM 8.8 8.3* 8.5*   MG  --  2.4  --    , CMP   Recent Labs  Lab 02/25/18  1202 02/26/18  0527 02/27/18  0454    137 139   K 3.8 4.6 4.7    106 109   CO2 25 24 24   * 90 81   BUN 40* 40* 36*   CREATININE 4.7* 4.3* 4.3*   CALCIUM 8.8 8.3* 8.5*   PROT 6.6  --   --    ALBUMIN 3.2*  --  2.8*   BILITOT 0.4  --   --    ALKPHOS 86  --   --    AST 13  --   --    ALT 13  --   --    ANIONGAP 10 7* 6*   ESTGFRAFRICA 13* 14* 14*   EGFRNONAA 11* 12*  12*   , CBC   Recent Labs  Lab 02/25/18  1202 02/26/18  0528 02/27/18  0454   WBC 6.90 5.70 5.20   HGB 9.9* 9.1* 9.3*   HCT 30.1* 27.5* 28.0*   * 121* 122*   , INR No results for input(s): INR, PROTIME in the last 48 hours., Lipid Panel No results for input(s): CHOL, HDL, LDLCALC, TRIG, CHOLHDL in the last 48 hours. and Troponin   Recent Labs  Lab 02/26/18  2038 02/26/18  2344 02/27/18  0454   TROPONINI 0.015 0.011 0.019       Significant Imaging:   CT Abdomen and Pelvis without contrast 02/25/2018:  1.  Acute, uncomplicated proximal sigmoid colonic diverticulitis. There is mural thickening in this location which is likely secondary to diverticular disease but injuring that this patient is up-to-date on routine colorectal cancer screening is recommended, given that a colonic mass could have a similar appearance.    2. Otherwise, no acute findings are noted. Additional findings:  -diffuse atherosclerosis including coronary artery disease and prior CABG  -Multiple bilateral renal masses including simple and hyperdense cysts, stable dating back to 1/15/16  -Moderate prostatomegaly  -Fat-containing, left indirect inguinal hernia. Mild tethering of the left anterior bladder wall in this location.    CXR 02/25/2018:  1.  Stable cardiomegaly and aortic tortuosity. Changes of prior CABG. No acute radiographic findings.    Prior Echo 04/2017:  CONCLUSIONS     1 - Mildly enlarged aortic root.     2 - Biatrial enlargement.     3 - Concentric hypertrophy.     4 - No wall motion abnormalities.     5 - Normal left ventricular systolic function (EF 55-60%).     6 - Mild aortic regurgitation.     7 - Mild mitral regurgitation.     8 - Indeterminate LV diastolic function.     9 - Normal right ventricular systolic function .     10 - The estimated PA systolic pressure is greater than 25 mmHg.     11 - Suggest some improvement in LVEF from Echo in 3/2016.     Prior Nuclear stress test/Lexiscan 03/2016:  Nuclear Quantitative  Functional Analysis:   LVEF: 34 % (normal is 47 - 59)  LVED Volume: 192 ml (normal is 91 - 155)  LVES Volume: 126 ml (normal is 40 - 78)    Impression: ABNORMAL MYOCARDIAL PERFUSION    Prior [facility] 05/2012:  B. HEMODYNAMIC RESULTS:    LVEDP (Pre): 14 mmHg  LVEDP (Post): 11 mmHg  Ejection Fraction: 50%  Global LV Function: normal    Air rest:  AO: 149/78 (106)  LV: 145  LVEDP: 14    C. ANGIOGRAPHIC RESULTS:    DIAGNOSTIC:       Patient has a right dominant coronary artery.        - Left Main Coronary Artery:             The LM is occluded. There is JOURDAN 0 flow.       - Left Anterior Descending Artery:             The LAD has luminal irregularities. There is JOURDAN 3 flow. Fed from the LIMA graft       - Left Circumflex Artery:             The LCX was not found.       - Right Coronary Artery:             The RCA has luminal irregularities. There is JOURDAN 3 flow. appear to be fed from SVG, could not cannulate, poorly visualized.       - Aortic Root:             The Aortic Root is normal. There is JOURDAN 3 flow.                     Lesion Details: Moderate proximal tortuosity, mild to moderate calcification.       - HERRON To LAD:             The LIMA to LAD is normal. There is JOURDAN 3 flow.      D. SUMMARY:  1. Three vessel coronary artery disease.  2. Normal LVEF.  3. Mild aortic insufficiency.  4. Very difficult study  5. Multiple attempts to cannulate SVG which appears to go to the RCA. Native RCA appears to have an ostial occlusion.  1. There is evidence for moderate myocardial ischemia in the inferolateral wall of the left ventricle with associated stress induced LV cavity dilatation with underlying injury present.   2. There is abnormal wall motion at rest showing moderate global hypokinesis of the left ventricle. severe hypokinesis of the inferolateral wall of the left ventricle.   3. There is resting LV dysfunction with a reduced ejection fraction of 34 %.  (normal is 47 - 59)  4. The left ventricular volume is mildly  increased at rest.   5. The extracardiac distribution of radioactivity is normal.   6. When compared to the previous study from 12/19/2013, current study indicate inferolateral defects.

## 2018-02-28 ENCOUNTER — TELEPHONE (OUTPATIENT)
Dept: MEDSURG UNIT | Facility: HOSPITAL | Age: 78
End: 2018-02-28

## 2018-03-01 ENCOUNTER — CLINICAL SUPPORT (OUTPATIENT)
Dept: FAMILY MEDICINE | Facility: CLINIC | Age: 78
End: 2018-03-01
Payer: MEDICARE

## 2018-03-01 VITALS — SYSTOLIC BLOOD PRESSURE: 118 MMHG | DIASTOLIC BLOOD PRESSURE: 72 MMHG | HEART RATE: 69 BPM

## 2018-03-01 DIAGNOSIS — R73.02 GLUCOSE INTOLERANCE (IMPAIRED GLUCOSE TOLERANCE): ICD-10-CM

## 2018-03-01 DIAGNOSIS — M17.11 OSTEOARTHRITIS OF RIGHT KNEE, UNSPECIFIED OSTEOARTHRITIS TYPE: Primary | ICD-10-CM

## 2018-03-01 PROCEDURE — 99999 PR PBB SHADOW E&M-EST. PATIENT-LVL III: CPT | Mod: PBBFAC,,, | Performed by: FAMILY MEDICINE

## 2018-03-01 PROCEDURE — 99213 OFFICE O/P EST LOW 20 MIN: CPT | Mod: PBBFAC,PO | Performed by: FAMILY MEDICINE

## 2018-03-01 NOTE — PROGRESS NOTES
Patient present for nurse blood pressure check. Denies dizziness, blurred vision, or pain of any kind. Verbalizes taking all medications as prescribed. Blood pressure assessed with patient's home monitor with results of 141/77 Pulse 64.  Blood pressure assessed per office robot with result of 117/69, Pulse 69.  Blood pressure assessed manually with result of 118/72. Dr. Lakhani notified while patient in office. Verbal order received for patient to continue medications as prescribed. Patient advised to purchase new monitor for home and log blood pressures for 2 weeks. Follow up nurse blood pressure visit scheduled. Patient agreed to appointment date and time.

## 2018-03-02 ENCOUNTER — LAB VISIT (OUTPATIENT)
Dept: LAB | Facility: HOSPITAL | Age: 78
End: 2018-03-02
Attending: INTERNAL MEDICINE
Payer: MEDICARE

## 2018-03-02 DIAGNOSIS — R53.83 FATIGUE: ICD-10-CM

## 2018-03-02 DIAGNOSIS — I25.810 CORONARY ATHEROSCLEROSIS OF AUTOLOGOUS VEIN BYPASS GRAFT: Primary | ICD-10-CM

## 2018-03-02 DIAGNOSIS — N18.30 CHRONIC KIDNEY DISEASE, STAGE III (MODERATE): ICD-10-CM

## 2018-03-02 DIAGNOSIS — M19.90 SENILE ARTHRITIS: ICD-10-CM

## 2018-03-02 DIAGNOSIS — M10.9 GOUT, UNSPECIFIED: ICD-10-CM

## 2018-03-02 DIAGNOSIS — N40.0 BENIGN ENLARGEMENT OF PROSTATE: ICD-10-CM

## 2018-03-02 DIAGNOSIS — I10 ESSENTIAL HYPERTENSION, MALIGNANT: ICD-10-CM

## 2018-03-02 DIAGNOSIS — N18.4 CHRONIC KIDNEY DISEASE, STAGE IV (SEVERE): ICD-10-CM

## 2018-03-02 LAB
ALBUMIN SERPL BCP-MCNC: 3.5 G/DL
ALP SERPL-CCNC: 86 U/L
ALT SERPL W/O P-5'-P-CCNC: 12 U/L
ANION GAP SERPL CALC-SCNC: 9 MMOL/L
AST SERPL-CCNC: 20 U/L
BASOPHILS # BLD AUTO: 0 K/UL
BASOPHILS NFR BLD: 0.3 %
BILIRUB SERPL-MCNC: 0.4 MG/DL
BUN SERPL-MCNC: 40 MG/DL
CALCIUM SERPL-MCNC: 8.9 MG/DL
CHLORIDE SERPL-SCNC: 105 MMOL/L
CO2 SERPL-SCNC: 23 MMOL/L
CREAT SERPL-MCNC: 4.6 MG/DL
DIFFERENTIAL METHOD: ABNORMAL
EOSINOPHIL # BLD AUTO: 0.2 K/UL
EOSINOPHIL NFR BLD: 3.4 %
ERYTHROCYTE [DISTWIDTH] IN BLOOD BY AUTOMATED COUNT: 14.2 %
EST. GFR  (AFRICAN AMERICAN): 13 ML/MIN/1.73 M^2
EST. GFR  (NON AFRICAN AMERICAN): 11 ML/MIN/1.73 M^2
GLUCOSE SERPL-MCNC: 106 MG/DL
HCT VFR BLD AUTO: 31.6 %
HGB BLD-MCNC: 10.6 G/DL
LYMPHOCYTES # BLD AUTO: 1.5 K/UL
LYMPHOCYTES NFR BLD: 21.5 %
MCH RBC QN AUTO: 30.1 PG
MCHC RBC AUTO-ENTMCNC: 33.5 G/DL
MCV RBC AUTO: 90 FL
MONOCYTES # BLD AUTO: 0.5 K/UL
MONOCYTES NFR BLD: 7.2 %
NEUTROPHILS # BLD AUTO: 4.7 K/UL
NEUTROPHILS NFR BLD: 67.6 %
PLATELET # BLD AUTO: 170 K/UL
PLATELET BLD QL SMEAR: ABNORMAL
PMV BLD AUTO: 8.3 FL
POTASSIUM SERPL-SCNC: 4.3 MMOL/L
PROT SERPL-MCNC: 7.1 G/DL
PTH-INTACT SERPL-MCNC: 174.3 PG/ML
RBC # BLD AUTO: 3.52 M/UL
SODIUM SERPL-SCNC: 137 MMOL/L
WBC # BLD AUTO: 6.9 K/UL

## 2018-03-02 PROCEDURE — 87340 HEPATITIS B SURFACE AG IA: CPT

## 2018-03-02 PROCEDURE — 80053 COMPREHEN METABOLIC PANEL: CPT

## 2018-03-02 PROCEDURE — 86706 HEP B SURFACE ANTIBODY: CPT

## 2018-03-02 PROCEDURE — 36415 COLL VENOUS BLD VENIPUNCTURE: CPT

## 2018-03-02 PROCEDURE — 83970 ASSAY OF PARATHORMONE: CPT

## 2018-03-02 PROCEDURE — 85025 COMPLETE CBC W/AUTO DIFF WBC: CPT

## 2018-03-02 PROCEDURE — 86704 HEP B CORE ANTIBODY TOTAL: CPT

## 2018-03-05 LAB
HBV CORE AB SERPL QL IA: POSITIVE
HBV SURFACE AB SER-ACNC: POSITIVE M[IU]/ML
HBV SURFACE AG SERPL QL IA: NEGATIVE

## 2018-03-08 DIAGNOSIS — M10.9 GOUT, ARTHRITIS: ICD-10-CM

## 2018-03-08 NOTE — TELEPHONE ENCOUNTER
Patient requesting a refill of Tramadol.  LR--4-26-17  LOV--2-15-18  FOV--3-15-18  Urine Toxicology-- None Noted  Pain Contract--None Noted

## 2018-03-09 ENCOUNTER — OFFICE VISIT (OUTPATIENT)
Dept: ORTHOPEDICS | Facility: CLINIC | Age: 78
End: 2018-03-09
Payer: MEDICARE

## 2018-03-09 ENCOUNTER — HOSPITAL ENCOUNTER (OUTPATIENT)
Dept: RADIOLOGY | Facility: HOSPITAL | Age: 78
Discharge: HOME | End: 2018-03-09
Attending: NURSE PRACTITIONER
Payer: MEDICARE

## 2018-03-09 VITALS — HEIGHT: 73 IN | BODY MASS INDEX: 29.82 KG/M2 | WEIGHT: 225 LBS

## 2018-03-09 DIAGNOSIS — M25.512 LEFT SHOULDER PAIN, UNSPECIFIED CHRONICITY: Primary | ICD-10-CM

## 2018-03-09 DIAGNOSIS — M25.512 LEFT SHOULDER PAIN, UNSPECIFIED CHRONICITY: ICD-10-CM

## 2018-03-09 PROCEDURE — 20610 DRAIN/INJ JOINT/BURSA W/O US: CPT | Mod: S$PBB,LT,, | Performed by: NURSE PRACTITIONER

## 2018-03-09 PROCEDURE — 73030 X-RAY EXAM OF SHOULDER: CPT | Mod: TC,PN,LT

## 2018-03-09 PROCEDURE — 99999 PR PBB SHADOW E&M-EST. PATIENT-LVL III: CPT | Mod: PBBFAC,,, | Performed by: NURSE PRACTITIONER

## 2018-03-09 PROCEDURE — 99214 OFFICE O/P EST MOD 30 MIN: CPT | Mod: S$PBB,25,, | Performed by: NURSE PRACTITIONER

## 2018-03-09 PROCEDURE — 99213 OFFICE O/P EST LOW 20 MIN: CPT | Mod: PBBFAC,25,PN | Performed by: NURSE PRACTITIONER

## 2018-03-09 PROCEDURE — 20610 DRAIN/INJ JOINT/BURSA W/O US: CPT | Mod: PBBFAC,PN | Performed by: NURSE PRACTITIONER

## 2018-03-09 PROCEDURE — 73030 X-RAY EXAM OF SHOULDER: CPT | Mod: 26,LT,, | Performed by: RADIOLOGY

## 2018-03-09 RX ORDER — TRIAMCINOLONE ACETONIDE 40 MG/ML
40 INJECTION, SUSPENSION INTRA-ARTICULAR; INTRAMUSCULAR
Status: DISCONTINUED | OUTPATIENT
Start: 2018-03-09 | End: 2018-03-10 | Stop reason: HOSPADM

## 2018-03-09 RX ADMIN — TRIAMCINOLONE ACETONIDE 40 MG: 40 INJECTION, SUSPENSION INTRA-ARTICULAR; INTRAMUSCULAR at 11:03

## 2018-03-09 NOTE — PROGRESS NOTES
DATE: 3/9/2018  PATIENT: Micheal Hunt  REFERRING MD:  CHIEF COMPLAINT: Left shoulder pain    HISTORY:  Micheal Hunt is a 77 y.o. male  who presents for initial evaluation of Left shoulder pain, he is right hand dominant.  The pain began last week when he reached quickly to catch a glass of water about to spill over.  The pain was immediate, like stabbing, and has been constant and worsening, he is unable to sleep.  He reports his pain today a 10/10.    He has a history of bilateral rotator cuff repair after a construction accident where a cabinet fell on him.      He is retired but [place] and his small  Grandchildren that he cares for.      PAST MEDICAL/SURGICAL HISTORY:  Past Medical History:   Diagnosis Date    Allergy     Arthritis     Gout    BPH (benign prostatic hyperplasia)     CAD (coronary artery disease) 2006    Chronic kidney disease     due to ibuprofen    Colon polyp     Diverticulosis     GERD (gastroesophageal reflux disease)     HTN (hypertension) 3/27/2012    Hyperlipidemia     LVH (left ventricular hypertrophy)     Murmur, cardiac 3/27/2012    GRICELDA (obstructive sleep apnea)     DOES NOT USE A MACHINE    Sinus problem     Syncope and collapse      Past Surgical History:   Procedure Laterality Date    APPENDECTOMY      CARDIAC CATHETERIZATION      COLONOSCOPY  2011    CORONARY ARTERY BYPASS GRAFT  4/2007    x 1    JOINT REPLACEMENT      left knee total replacement  X 3    mid leftt finger      from a cactuss    MOLE REMOVAL  2016    rotative cuff      no rotative cuffs on bilat shoulders has pins     SHOULDER SURGERY      shoulder surgery bilat  RIGHT X 4; LEFT X 3       Current Medications:   Current Outpatient Prescriptions:     albuterol 90 mcg/actuation inhaler, 2 puffs every 4 hours as needed for cough, wheeze, or shortness of breath, Disp: 3 Inhaler, Rfl: 3    albuterol-ipratropium 2.5mg-0.5mg/3mL (DUO-NEB) 0.5 mg-3 mg(2.5 mg  base)/3 mL nebulizer solution, INHALE 1 VIAL BY NEBULIZER EVERY 6 HOURS AS NEEDED FOR WHEEZING, Disp: 270 mL, Rfl: 0    allopurinol (ZYLOPRIM) 300 MG tablet, Take 1 tablet (300 mg total) by mouth once daily., Disp: 90 tablet, Rfl: 3    amlodipine (NORVASC) 10 MG tablet, Take 1 tablet (10 mg total) by mouth before dinner., Disp: 90 tablet, Rfl: 3    aspirin (ECOTRIN) 81 MG EC tablet, Take 81 mg by mouth once daily.  , Disp: , Rfl:     atorvastatin (LIPITOR) 80 MG tablet, Take 1 tablet (80 mg total) by mouth once daily., Disp: 90 tablet, Rfl: 3    budesonide-formoterol 160-4.5 mcg (SYMBICORT) 160-4.5 mcg/actuation HFAA, Inhale 2 puffs into the lungs every 12 (twelve) hours. Controller, Disp: 3 Inhaler, Rfl: 3    calcitRIOL (ROCALTROL) 0.25 MCG Cap, , Disp: , Rfl:     calcium-vitamin D 500-125 mg-unit tablet, Take 1 tablet by mouth as needed. , Disp: , Rfl:     carvedilol (COREG) 6.25 MG tablet, TAKE 1 TABLET (6.25 MG TOTAL) BY MOUTH 2 (TWO) TIMES DAILY WITH MEALS., Disp: 90 tablet, Rfl: 3    cyanocobalamin, vitamin B-12, (VITAMIN B-12) 1,000 mcg Subl, Take 1 tablet by mouth daily as needed. Takes 3,000mcg, Disp: , Rfl:     finasteride (PROSCAR) 5 mg tablet, TAKE 1 TABLET ONCE DAILY., Disp: 90 tablet, Rfl: 3    fluticasone (FLONASE) 50 mcg/actuation nasal spray, 2 sprays (100 mcg total) by Each Nare route once daily., Disp: 3 Bottle, Rfl: 3    montelukast (SINGULAIR) 10 mg tablet, Take 1 tablet (10 mg total) by mouth every evening., Disp: 90 tablet, Rfl: 3    nitroGLYCERIN (NITROSTAT) 0.4 MG SL tablet, Place 1 tablet (0.4 mg total) under the tongue every 5 (five) minutes as needed for Chest pain., Disp: 25 tablet, Rfl: 4    sodium chloride (SALINE NASAL MIST) 3 % Mist, 1 spray by Nasal route 2 (two) times daily., Disp: , Rfl:     terazosin (HYTRIN) 5 MG capsule, Take 2 capsules (10 mg total) by mouth every evening., Disp: 90 capsule, Rfl: 3    tramadol (ULTRAM) 50 mg tablet, Take 1 tablet (50 mg total)  by mouth every 6 (six) hours as needed., Disp: 120 tablet, Rfl: 4    valsartan (DIOVAN) 160 MG tablet, Take 1 tablet (160 mg total) by mouth once daily., Disp: 90 tablet, Rfl: 3    zolpidem (AMBIEN) 5 MG Tab, Take 1 tablet (5 mg total) by mouth nightly as needed., Disp: 30 tablet, Rfl: 3    Family History: family history was reviewed and is noncontributory  Social History:   Social History     Social History    Marital status:      Spouse name: N/A    Number of children: N/A    Years of education: N/A     Occupational History    Not on file.     Social History Main Topics    Smoking status: Never Smoker    Smokeless tobacco: Never Used    Alcohol use Yes      Comment: rare    Drug use: No    Sexual activity: Not on file     Other Topics Concern    Not on file     Social History Narrative    No narrative on file       ROS:  Constitution: Negative for chills, fever, and sweats. Negative for unexplained weight loss.  HENT: Negative for headaches and blurry vision.   Cardiovascular: Negative for chest pain, irregular heartbeat, leg swelling and palpitations.   Respiratory: Negative for cough and shortness of breath.   Gastrointestinal: Negative for abdominal pain, heartburn, nausea and vomiting.   Genitourinary: Negative for bladder incontinence and dysuria.   Musculoskeletal: Negative for systemic arthritis, muscle weakness and myalgias.   Neurological: Negative for numbness.   Psychiatric/Behavioral: Negative for depression.  Endocrine: Negative for polyuria.   Hematologic/Lymphatic: Negative for bleeding disorders.   Skin: Negative for poor wound healing.      PHYSICAL EXAM:  There were no vitals taken for this visit.  Micheal Hunt is a well developed, well nourished male in no acute distress. Physical examination of the left shoulder evaluated the following:    Inspection, palpation and ROM of the cervical spine  Disc compression testing bilaterally  Inspection for swelling, ecchymosis,  erythema, deformity and atrophy  Tenderness to palpation of the soft tissue and bony structures  Active and passive range of motion  Sensation of the shoulder and upper extremity  Motor strength in the deltoid, supraspinatus, internal rotators and external rotators  Impingement, apprehension, relocation and Speed's tests  Upper extremity vascular exam (skin temp,color, capillary refill)  Inspection for pseudomotor signs    Remarkable findings included:  Edema to anterior shoulder  Active ROM limited to 110 degrees forward flexion,  Weakness with resisted forward flexion and external rotation  Pain with all ROM    IMAGING:   X-rays obtained today  Personally reviewed.  Radiologist report as follows:    No acute fracture or dislocation is seen. Changes of prior tenodesis project over the greater humeral tuberosity. There is severe narrowing of the left subacromial space. A moderate left AC joint and glenohumeral joint space narrowing and   osteophytosis is noted. No focal osseous lesion is seen. No bony erosion is seen.   IMPRESSION:  Severe left subacromial space narrowing consistent with a rotator cuff tear. There are features of prior left proximal humeral tenodesis which may reflect prior rotator cuff repair.    Moderate left AC joint and glenohumeral joint osteoarthritis pattern.      ASSESSMENT:  Rotator cuff tear and osteoarthritis    PLAN:  The nature of the diagnosis, using models and diagrams when appropriate, was explained to the patient in detail.Treatment option discussed included cortisone injection for acute inflammation with MR arthrogram and arthroscopy. All questions answered and the patient wishes to have a cortisone injection today (see procedure notes) and schedule an MR arthrogram.  Patient is instructed to call to schedule a follow up appointment to review results of MR arthrogram.

## 2018-03-10 NOTE — PROCEDURES
Large Joint Injection  Date/Time: 3/9/2018 11:00 AM  Performed by: BRANDON HUBER  Authorized by: BRANDON HUBER     Consent Done?:  Yes (Verbal)  Indications:  Pain and joint swelling  Timeout: Prior to procedure the correct patient, procedure, and site was verified      Location:  Shoulder  Site:  L subacromial bursa  Prep: Patient was prepped and draped in usual sterile fashion    Ultrasonic Guidance for needle placement: No  Needle size:  25 G  Approach:  Anterolateral  Medications:  40 mg triamcinolone acetonide 40 mg/mL  Patient tolerance:  Patient tolerated the procedure well with no immediate complications

## 2018-03-11 RX ORDER — TRAMADOL HYDROCHLORIDE 50 MG/1
50 TABLET ORAL EVERY 8 HOURS PRN
Qty: 90 TABLET | Refills: 1 | Status: SHIPPED | OUTPATIENT
Start: 2018-03-11 | End: 2018-06-20

## 2018-03-11 RX ORDER — TRAMADOL HYDROCHLORIDE 50 MG/1
50 TABLET ORAL EVERY 6 HOURS PRN
Qty: 120 TABLET | OUTPATIENT
Start: 2018-03-11

## 2018-03-13 ENCOUNTER — PATIENT MESSAGE (OUTPATIENT)
Dept: FAMILY MEDICINE | Facility: CLINIC | Age: 78
End: 2018-03-13

## 2018-03-13 DIAGNOSIS — G47.9 SLEEP DISORDER: ICD-10-CM

## 2018-03-13 RX ORDER — ZOLPIDEM TARTRATE 5 MG/1
5 TABLET ORAL NIGHTLY PRN
Qty: 30 TABLET | Refills: 5 | Status: ON HOLD | OUTPATIENT
Start: 2018-03-13 | End: 2018-10-22 | Stop reason: HOSPADM

## 2018-03-14 ENCOUNTER — TELEPHONE (OUTPATIENT)
Dept: ORTHOPEDICS | Facility: CLINIC | Age: 78
End: 2018-03-14

## 2018-03-14 DIAGNOSIS — I10 ESSENTIAL HYPERTENSION: ICD-10-CM

## 2018-03-14 RX ORDER — TERAZOSIN 5 MG/1
5 CAPSULE ORAL NIGHTLY
Qty: 90 CAPSULE | Refills: 0 | Status: SHIPPED | OUTPATIENT
Start: 2018-03-14 | End: 2018-04-10 | Stop reason: DRUGHIGH

## 2018-03-14 NOTE — TELEPHONE ENCOUNTER
----- Message from Kandice Hunt sent at 3/14/2018  9:22 AM CDT -----  Contact: Self  Pt is requesting a call back re: MRI

## 2018-03-15 ENCOUNTER — DOCUMENTATION ONLY (OUTPATIENT)
Dept: FAMILY MEDICINE | Facility: CLINIC | Age: 78
End: 2018-03-15

## 2018-03-15 NOTE — PROGRESS NOTES
Pre-Visit Chart Review  For Appointment Scheduled on 03/16/2018    Health Maintenance Due   Topic Date Due    TETANUS VACCINE  09[birth year]27/1958    Zoster Vaccine  09/27/1999    Influenza Vaccine  08/01/2017    Lipid Panel  03/10/2018

## 2018-03-16 ENCOUNTER — OFFICE VISIT (OUTPATIENT)
Dept: FAMILY MEDICINE | Facility: CLINIC | Age: 78
End: 2018-03-16
Payer: MEDICARE

## 2018-03-16 ENCOUNTER — TELEPHONE (OUTPATIENT)
Dept: ORTHOPEDICS | Facility: CLINIC | Age: 78
End: 2018-03-16

## 2018-03-16 VITALS
DIASTOLIC BLOOD PRESSURE: 67 MMHG | WEIGHT: 227.06 LBS | SYSTOLIC BLOOD PRESSURE: 131 MMHG | HEIGHT: 73 IN | TEMPERATURE: 98 F | BODY MASS INDEX: 30.09 KG/M2 | HEART RATE: 61 BPM

## 2018-03-16 DIAGNOSIS — I10 ESSENTIAL HYPERTENSION: ICD-10-CM

## 2018-03-16 DIAGNOSIS — K57.30 DIVERTICULOSIS OF LARGE INTESTINE WITHOUT HEMORRHAGE: Primary | ICD-10-CM

## 2018-03-16 PROCEDURE — 99214 OFFICE O/P EST MOD 30 MIN: CPT | Mod: S$PBB,,, | Performed by: FAMILY MEDICINE

## 2018-03-16 PROCEDURE — 99213 OFFICE O/P EST LOW 20 MIN: CPT | Mod: PBBFAC,PO | Performed by: FAMILY MEDICINE

## 2018-03-16 PROCEDURE — 99999 PR PBB SHADOW E&M-EST. PATIENT-LVL III: CPT | Mod: PBBFAC,,, | Performed by: FAMILY MEDICINE

## 2018-03-16 RX ORDER — VALSARTAN 320 MG/1
320 TABLET ORAL DAILY
Qty: 90 TABLET | Refills: 3
Start: 2018-03-16 | End: 2018-08-13 | Stop reason: SINTOL

## 2018-03-16 RX ORDER — VALSARTAN 160 MG/1
320 TABLET ORAL DAILY
Qty: 180 TABLET | Refills: 3 | Status: SHIPPED | OUTPATIENT
Start: 2018-03-16 | End: 2018-03-16 | Stop reason: SDUPTHER

## 2018-03-16 NOTE — TELEPHONE ENCOUNTER
----- Message from Love Padilla sent at 3/14/2018  1:16 PM CDT -----  Contact: self  Patient left a voice mail message today at 10:30 that he is calling to schedule an MRI.  Please call.

## 2018-03-26 DIAGNOSIS — I10 ESSENTIAL HYPERTENSION: ICD-10-CM

## 2018-03-26 RX ORDER — AMLODIPINE BESYLATE 10 MG/1
TABLET ORAL
Qty: 90 TABLET | Refills: 3 | Status: SHIPPED | OUTPATIENT
Start: 2018-03-26 | End: 2018-07-05 | Stop reason: SDUPTHER

## 2018-03-31 NOTE — PROGRESS NOTES
Patient, Micheal Hunt (MRN #[id number]), presented with a recent estimated Glumerular Filtration Rate (eGFR) less than 15 consistent with the definition of chronic kidney disease stage 5 (ICD10 - N18.5).    eGFR if non    Date Value Ref Range Status   03/02/2018 11 (A) >60 mL/min/1.73 m^2 Final     Comment:     Calculation used to obtain the estimated glomerular filtration  rate (eGFR) is the CKD-EPI equation.          The patient's chronic kidney disease stage 5 was monitored, evaluated, addressed and/or treated. This addendum to the medical record is made on 03/31/2018.

## 2018-04-05 ENCOUNTER — TELEPHONE (OUTPATIENT)
Dept: GASTROENTEROLOGY | Facility: CLINIC | Age: 78
End: 2018-04-05

## 2018-04-05 NOTE — TELEPHONE ENCOUNTER
----- Message from Lety Aguilar sent at 4/5/2018 10:17 AM CDT -----      Please  Call  Pt  At [phone] to   Discuss  Lauren // please call

## 2018-04-10 ENCOUNTER — OFFICE VISIT (OUTPATIENT)
Dept: FAMILY MEDICINE | Facility: CLINIC | Age: 78
End: 2018-04-10
Payer: MEDICARE

## 2018-04-10 VITALS
SYSTOLIC BLOOD PRESSURE: 109 MMHG | BODY MASS INDEX: 28.78 KG/M2 | HEIGHT: 73 IN | OXYGEN SATURATION: 98 % | HEART RATE: 67 BPM | TEMPERATURE: 98 F | WEIGHT: 217.13 LBS | DIASTOLIC BLOOD PRESSURE: 67 MMHG

## 2018-04-10 DIAGNOSIS — I10 BENIGN ESSENTIAL HTN: Primary | ICD-10-CM

## 2018-04-10 DIAGNOSIS — I11.9 HYPERTENSIVE LEFT VENTRICULAR HYPERTROPHY, WITHOUT HEART FAILURE: ICD-10-CM

## 2018-04-10 DIAGNOSIS — N18.5 CKD (CHRONIC KIDNEY DISEASE) STAGE 5, GFR LESS THAN 15 ML/MIN: ICD-10-CM

## 2018-04-10 PROCEDURE — 99215 OFFICE O/P EST HI 40 MIN: CPT | Mod: PBBFAC,PO | Performed by: PHYSICIAN ASSISTANT

## 2018-04-10 PROCEDURE — 99214 OFFICE O/P EST MOD 30 MIN: CPT | Mod: S$PBB,,, | Performed by: PHYSICIAN ASSISTANT

## 2018-04-10 PROCEDURE — 99999 PR PBB SHADOW E&M-EST. PATIENT-LVL V: CPT | Mod: PBBFAC,,, | Performed by: PHYSICIAN ASSISTANT

## 2018-04-10 RX ORDER — TERAZOSIN 1 MG/1
1 CAPSULE ORAL NIGHTLY
Qty: 30 CAPSULE | Refills: 1 | Status: SHIPPED | OUTPATIENT
Start: 2018-04-10 | End: 2018-04-27

## 2018-04-10 NOTE — PATIENT INSTRUCTIONS
Walking for Fitness  Fitness walking has something for everyone, even people who are already fit. Walking is one of the safest ways to condition your body aerobically. It can boost energy, help you lose weight, and reduce stress.    Physical benefits  · Walking strengthens your heart and lungs, and tones your muscles.  · When walking, your feet land with less impact than in other sports. This reduces chances of muscle, bone, and joint injury.  · Regular walking improves your cholesterol levels and lowers your risk of heart disease. And it helps you control your blood sugar if you have diabetes.  · Walking is a weight-bearing activity, which helps maintain bone density. This can help prevent osteoporosis.  Personal rewards  · Taking walks can help you relax and manage stress. And fitness walking may make you feel better about yourself.  · Walking can help you sleep better at night and make you less likely to be depressed.  · Regular walking may help maintain your memory as you get older.  · Walking is a great way to spend extra time with friends and family members. Be sure to invite your dog along!  Q&A about fitness walking  Q: Will walking keep me fit?  A: Yes. Regular walking at the right pace gives you all the benefits of other aerobic activities, such as jogging and swimming.  Q: Will walking help me lose weight and keep it off?  A: Yes. Per mile, walking can burn as many calories as jogging. Your health care provider can help work walking into your weight-loss plan.  Q: Is walking safe for my health?  A: Yes. Walking is safe if you have high blood pressure, diabetes, heart disease, or other conditions. Talk to your healthcare provider before you start.  Date Last Reviewed: 4/1/2017 © 2000-2017 "[employer]. [address] [place] [address], [place] [address]. All rights reserved. This information is not intended as a substitute for professional medical care. Always follow your healthcare professional's  instructions.            Weight Management: Getting Started  Healthy bodies come in all shapes and sizes. Not all bodies are made to be thin. For some people, a healthy weight is higher than the average weight listed on weight charts. Your healthcare provider can help you decide on a healthy weight for you.    Reasons to lose weight  Losing weight can help with some health problems, such as high blood pressure, heart disease, diabetes, sleep apnea, and arthritis. You may also feel more energy.  Set your long-term goal  Your goal doesn't even have to be a specific weight. You may decide on a fitness goal (such as being able to walk 10 miles a week), or a health goal (such as lowering your blood pressure). Choose a goal that is measurable and reasonable, so you know when you've reached it. A goal of reaching a BMI of less than 25 is not always reasonable (or possible).   Make an action plan  Habits dont change overnight. Setting your goals too high can leave you feeling discouraged if you cant reach them. Be realistic. Choose one or two small changes you can make now. Set an action plan for how you are going to make these changes. When you can stick to this plan, keep making a few more small changes. Taking small steps will help you stay on the path to success.  Track your progress  Write down your goals. Then, keep a daily record of your progress. Write down what you eat and how active you are. This record lets you look back on how much youve done. It may also help when youre feeling frustrated. Reward yourself for success. Even if you dont reach every goal, give yourself credit for what you do get done.  Get support  Encouragement from others can help make losing weight easier. Ask your family members and friends for support. They may even want to join you. Also look to your healthcare provider, registered dietitian, and  for help. Your local hospital can give you more information about  nutrition, exercise, and weight loss.  Date Last Reviewed: 1/31/2016  © 1469-7365 [employer]. [address] [place] [address], [place] [address]. All rights reserved. This information is not intended as a substitute for professional medical care. Always follow your healthcare professional's instructions.

## 2018-04-10 NOTE — PROGRESS NOTES
Subjective:       Patient ID: Micheal Hunt is a 77 y.o. male.    Chief Complaint: Follow-up (2mth f/u)    Mr. Hunt comes to clinic today for follow up. The patient had diverticulitis in February when he was treated in the hospital. He has recovered from this; he is to follow up with GI this month. He has been monitoring his blood pressure. His blood pressure has been running low and causing him to have episodes of dizziness and weakness. The patient it currently on 4 different blood pressure agents.THe patient is followed by cardiologist, Dr. Dunlap. The patient does have worsening CKD. He is followed by nephrologist, Dr. Rojo. He recently had a dialysis access placed in his left arm with Dr. Fregoso in the event that he will need dialysis.       Review of Systems   Constitutional: Negative for activity change, appetite change and fever.   HENT: Negative for postnasal drip, rhinorrhea and sinus pressure.    Eyes: Negative for visual disturbance.   Respiratory: Negative for cough and shortness of breath.    Cardiovascular: Negative for chest pain.   Gastrointestinal: Negative for abdominal distention and abdominal pain.   Genitourinary: Negative for difficulty urinating and dysuria.   Musculoskeletal: Negative for arthralgias and myalgias.   Neurological: Positive for dizziness and weakness. Negative for headaches.   Hematological: Negative for adenopathy.   Psychiatric/Behavioral: The patient is not nervous/anxious.        Objective:      Physical Exam   Constitutional: He is oriented to person, place, and time.   HENT:   Mouth/Throat: Oropharynx is clear and moist. No oropharyngeal exudate.   Eyes: Conjunctivae are normal. Pupils are equal, round, and reactive to light.   Cardiovascular: Normal rate and regular rhythm.    Murmur heard.  Pulmonary/Chest: Effort normal and breath sounds normal. He has no wheezes.   Abdominal: Soft. Bowel sounds are normal. There is no tenderness.   Musculoskeletal: He  exhibits no edema.   Lymphadenopathy:     He has no cervical adenopathy.   Neurological: He is alert and oriented to person, place, and time.   Skin: No erythema.   Psychiatric: His behavior is normal.       Assessment:       1. Benign essential HTN    2. Hypertensive left ventricular hypertrophy, without heart failure    3. CKD (chronic kidney disease) stage 5, GFR less than 15 ml/min        Plan:   Micheal was seen today for follow-up.    Diagnoses and all orders for this visit:    Benign essential HTN  -     terazosin (HYTRIN) 1 MG capsule; Take 1 capsule (1 mg total) by mouth every evening.  Pressure running a lower limit of normal. Reduce hytrin from 5mg to 1mg  Follow up in 2 weeks for blood pressure check  Continue amlodipine and coreg  Hypertensive left ventricular hypertrophy, without heart failure  Continue follow up with Dr. Dunlap  CKD (chronic kidney disease) stage 5, GFR less than 15 ml/min  Continue follow up with Dr. Rojo.    Patient readiness: acceptance and barriers:none    During the course of the visit the patient was educated and counseled about the following:     Hypertension:   Medication: see changes above.  Dietary sodium restriction.  Regular aerobic exercise.    Goals: Hypertension: Reduce Blood Pressure    Did patient meet goals/outcomes: Yes    The following self management tools provided: blood pressure log    Patient Instructions (the written plan) was given to the patient/family.     Time spent with patient: 30 minutes    Barriers to medications present (no )    Adverse reactions to current medications (yes)    Over the counter medications reviewed (Yes)

## 2018-04-11 DIAGNOSIS — E78.5 HYPERLIPIDEMIA, UNSPECIFIED HYPERLIPIDEMIA TYPE: ICD-10-CM

## 2018-04-11 RX ORDER — ATORVASTATIN CALCIUM 80 MG/1
80 TABLET, FILM COATED ORAL DAILY
Qty: 90 TABLET | Refills: 3 | Status: SHIPPED | OUTPATIENT
Start: 2018-04-11 | End: 2019-10-11

## 2018-04-13 ENCOUNTER — LAB VISIT (OUTPATIENT)
Dept: LAB | Facility: HOSPITAL | Age: 78
End: 2018-04-13
Attending: INTERNAL MEDICINE
Payer: MEDICARE

## 2018-04-13 DIAGNOSIS — N18.30 CHRONIC KIDNEY DISEASE, STAGE III (MODERATE): ICD-10-CM

## 2018-04-13 DIAGNOSIS — I25.810 CORONARY ATHEROSCLEROSIS OF AUTOLOGOUS VEIN BYPASS GRAFT: Primary | ICD-10-CM

## 2018-04-13 DIAGNOSIS — I10 ESSENTIAL HYPERTENSION, MALIGNANT: ICD-10-CM

## 2018-04-13 DIAGNOSIS — M10.9 GOUT, UNSPECIFIED: ICD-10-CM

## 2018-04-13 DIAGNOSIS — N40.0 BENIGN ENLARGEMENT OF PROSTATE: ICD-10-CM

## 2018-04-13 DIAGNOSIS — N18.4 CHRONIC KIDNEY DISEASE, STAGE IV (SEVERE): ICD-10-CM

## 2018-04-13 DIAGNOSIS — R53.83 FATIGUE: ICD-10-CM

## 2018-04-13 DIAGNOSIS — M19.90 SENILE ARTHRITIS: ICD-10-CM

## 2018-04-13 LAB
ALBUMIN SERPL BCP-MCNC: 3.7 G/DL
ALBUMIN SERPL BCP-MCNC: 3.7 G/DL
ALP SERPL-CCNC: 78 U/L
ALT SERPL W/O P-5'-P-CCNC: 11 U/L
ANION GAP SERPL CALC-SCNC: 10 MMOL/L
ANION GAP SERPL CALC-SCNC: 10 MMOL/L
AST SERPL-CCNC: 17 U/L
BASOPHILS # BLD AUTO: 0 K/UL
BASOPHILS NFR BLD: 0.5 %
BILIRUB SERPL-MCNC: 0.3 MG/DL
BUN SERPL-MCNC: 41 MG/DL
BUN SERPL-MCNC: 41 MG/DL
CALCIUM SERPL-MCNC: 9.5 MG/DL
CALCIUM SERPL-MCNC: 9.5 MG/DL
CHLORIDE SERPL-SCNC: 105 MMOL/L
CHLORIDE SERPL-SCNC: 105 MMOL/L
CO2 SERPL-SCNC: 24 MMOL/L
CO2 SERPL-SCNC: 24 MMOL/L
CREAT SERPL-MCNC: 4.2 MG/DL
CREAT SERPL-MCNC: 4.2 MG/DL
DIFFERENTIAL METHOD: ABNORMAL
EOSINOPHIL # BLD AUTO: 0.2 K/UL
EOSINOPHIL NFR BLD: 3 %
ERYTHROCYTE [DISTWIDTH] IN BLOOD BY AUTOMATED COUNT: 14.6 %
EST. GFR  (AFRICAN AMERICAN): 15 ML/MIN/1.73 M^2
EST. GFR  (AFRICAN AMERICAN): 15 ML/MIN/1.73 M^2
EST. GFR  (NON AFRICAN AMERICAN): 13 ML/MIN/1.73 M^2
EST. GFR  (NON AFRICAN AMERICAN): 13 ML/MIN/1.73 M^2
GLUCOSE SERPL-MCNC: 99 MG/DL
GLUCOSE SERPL-MCNC: 99 MG/DL
HCT VFR BLD AUTO: 29.3 %
HGB BLD-MCNC: 10 G/DL
LYMPHOCYTES # BLD AUTO: 1.4 K/UL
LYMPHOCYTES NFR BLD: 26.1 %
MCH RBC QN AUTO: 31.3 PG
MCHC RBC AUTO-ENTMCNC: 34.1 G/DL
MCV RBC AUTO: 92 FL
MONOCYTES # BLD AUTO: 0.3 K/UL
MONOCYTES NFR BLD: 5.7 %
NEUTROPHILS # BLD AUTO: 3.6 K/UL
NEUTROPHILS NFR BLD: 64.7 %
PHOSPHATE SERPL-MCNC: 4.3 MG/DL
PHOSPHATE SERPL-MCNC: 4.3 MG/DL
PLATELET # BLD AUTO: 164 K/UL
PMV BLD AUTO: 8.1 FL
POTASSIUM SERPL-SCNC: 4.1 MMOL/L
POTASSIUM SERPL-SCNC: 4.1 MMOL/L
PROT SERPL-MCNC: 6.7 G/DL
RBC # BLD AUTO: 3.2 M/UL
SODIUM SERPL-SCNC: 139 MMOL/L
SODIUM SERPL-SCNC: 139 MMOL/L
WBC # BLD AUTO: 5.5 K/UL

## 2018-04-13 PROCEDURE — 84100 ASSAY OF PHOSPHORUS: CPT

## 2018-04-13 PROCEDURE — 87516 HEPATITIS B DNA AMP PROBE: CPT

## 2018-04-13 PROCEDURE — 80053 COMPREHEN METABOLIC PANEL: CPT

## 2018-04-13 PROCEDURE — 87340 HEPATITIS B SURFACE AG IA: CPT

## 2018-04-13 PROCEDURE — 85025 COMPLETE CBC W/AUTO DIFF WBC: CPT

## 2018-04-13 PROCEDURE — 86704 HEP B CORE ANTIBODY TOTAL: CPT

## 2018-04-13 PROCEDURE — 36415 COLL VENOUS BLD VENIPUNCTURE: CPT

## 2018-04-13 PROCEDURE — 86706 HEP B SURFACE ANTIBODY: CPT

## 2018-04-16 LAB
HBV CORE AB SERPL QL IA: POSITIVE
HBV DNA SPEC QL NAA+PROBE: NOT DETECTED
HBV SURFACE AB SER-ACNC: POSITIVE M[IU]/ML
HBV SURFACE AG SERPL QL IA: NEGATIVE

## 2018-04-24 ENCOUNTER — TELEPHONE (OUTPATIENT)
Dept: UROLOGY | Facility: CLINIC | Age: 78
End: 2018-04-24

## 2018-04-25 ENCOUNTER — OFFICE VISIT (OUTPATIENT)
Dept: FAMILY MEDICINE | Facility: CLINIC | Age: 78
End: 2018-04-25
Payer: MEDICARE

## 2018-04-25 ENCOUNTER — INITIAL CONSULT (OUTPATIENT)
Dept: GASTROENTEROLOGY | Facility: CLINIC | Age: 78
End: 2018-04-25
Payer: MEDICARE

## 2018-04-25 ENCOUNTER — LAB VISIT (OUTPATIENT)
Dept: LAB | Facility: HOSPITAL | Age: 78
End: 2018-04-25
Attending: PHYSICIAN ASSISTANT
Payer: MEDICARE

## 2018-04-25 VITALS
WEIGHT: 221.81 LBS | HEART RATE: 55 BPM | HEIGHT: 73 IN | BODY MASS INDEX: 29.4 KG/M2 | DIASTOLIC BLOOD PRESSURE: 69 MMHG | SYSTOLIC BLOOD PRESSURE: 135 MMHG

## 2018-04-25 VITALS
WEIGHT: 221.13 LBS | OXYGEN SATURATION: 98 % | HEIGHT: 73 IN | BODY MASS INDEX: 29.31 KG/M2 | DIASTOLIC BLOOD PRESSURE: 79 MMHG | SYSTOLIC BLOOD PRESSURE: 128 MMHG | RESPIRATION RATE: 14 BRPM | HEART RATE: 62 BPM

## 2018-04-25 DIAGNOSIS — K57.32 SIGMOID DIVERTICULITIS: ICD-10-CM

## 2018-04-25 DIAGNOSIS — N18.4 CKD (CHRONIC KIDNEY DISEASE) STAGE 4, GFR 15-29 ML/MIN: ICD-10-CM

## 2018-04-25 DIAGNOSIS — R42 DIZZINESS: ICD-10-CM

## 2018-04-25 DIAGNOSIS — I51.9 LV DYSFUNCTION: Primary | ICD-10-CM

## 2018-04-25 DIAGNOSIS — I10 ESSENTIAL HYPERTENSION: Primary | ICD-10-CM

## 2018-04-25 DIAGNOSIS — D63.1 ANEMIA DUE TO STAGE 4 CHRONIC KIDNEY DISEASE: ICD-10-CM

## 2018-04-25 DIAGNOSIS — K59.09 CHRONIC CONSTIPATION: ICD-10-CM

## 2018-04-25 DIAGNOSIS — N18.4 ANEMIA DUE TO STAGE 4 CHRONIC KIDNEY DISEASE: ICD-10-CM

## 2018-04-25 DIAGNOSIS — K31.89 GASTRIC NODULE: ICD-10-CM

## 2018-04-25 DIAGNOSIS — E78.00 PURE HYPERCHOLESTEROLEMIA: ICD-10-CM

## 2018-04-25 DIAGNOSIS — K21.00 GASTROESOPHAGEAL REFLUX DISEASE WITH ESOPHAGITIS: ICD-10-CM

## 2018-04-25 DIAGNOSIS — Z86.010 PERSONAL HISTORY OF COLONIC POLYPS: ICD-10-CM

## 2018-04-25 LAB
CHOLEST SERPL-MCNC: 102 MG/DL
CHOLEST/HDLC SERPL: 3.3 {RATIO}
HDLC SERPL-MCNC: 31 MG/DL
HDLC SERPL: 30.4 %
LDLC SERPL CALC-MCNC: 47.6 MG/DL
NONHDLC SERPL-MCNC: 71 MG/DL
TRIGL SERPL-MCNC: 117 MG/DL

## 2018-04-25 PROCEDURE — 99213 OFFICE O/P EST LOW 20 MIN: CPT | Mod: PBBFAC,27,PO | Performed by: INTERNAL MEDICINE

## 2018-04-25 PROCEDURE — 99999 PR PBB SHADOW E&M-EST. PATIENT-LVL III: CPT | Mod: PBBFAC,,, | Performed by: INTERNAL MEDICINE

## 2018-04-25 PROCEDURE — 99214 OFFICE O/P EST MOD 30 MIN: CPT | Mod: S$PBB,,, | Performed by: INTERNAL MEDICINE

## 2018-04-25 PROCEDURE — 99999 PR PBB SHADOW E&M-EST. PATIENT-LVL V: CPT | Mod: PBBFAC,,, | Performed by: PHYSICIAN ASSISTANT

## 2018-04-25 PROCEDURE — 99215 OFFICE O/P EST HI 40 MIN: CPT | Mod: PBBFAC,PO | Performed by: PHYSICIAN ASSISTANT

## 2018-04-25 PROCEDURE — 36415 COLL VENOUS BLD VENIPUNCTURE: CPT | Mod: PO

## 2018-04-25 PROCEDURE — 80061 LIPID PANEL: CPT

## 2018-04-25 PROCEDURE — 99214 OFFICE O/P EST MOD 30 MIN: CPT | Mod: S$PBB,,, | Performed by: PHYSICIAN ASSISTANT

## 2018-04-25 NOTE — PROGRESS NOTES
Subjective:       Patient ID: Micheal Hunt is a 77 y.o. male.    Chief Complaint: Follow-up (2wk f/u)    Mr. Hunt comes to clinic today for 2 week follow up of low blood pressure and dizziness. The patient reports since the adjustment in his blood pressure medication (terazosin reduced to 1mg from 5mg), his dizziness has resolved. The patient continues to follow up with Dr. Rojo, nephrologist, for CKD stage 4. The patient's renal function is stable at this time. The patient has no additional complaints today and reports he is well. The patient is due to update his lipid panel. The patient is also due for immunizations that he must receive at the pharmacy.       Review of Systems   Constitutional: Negative for activity change, appetite change and fever.   HENT: Negative for postnasal drip, rhinorrhea and sinus pressure.    Eyes: Negative for visual disturbance.   Respiratory: Negative for cough and shortness of breath.    Cardiovascular: Negative for chest pain.   Gastrointestinal: Negative for abdominal distention and abdominal pain.   Genitourinary: Negative for difficulty urinating and dysuria.   Musculoskeletal: Negative for arthralgias and myalgias.   Neurological: Negative for headaches.   Hematological: Negative for adenopathy.   Psychiatric/Behavioral: The patient is not nervous/anxious.        Objective:      Physical Exam   Constitutional: He is oriented to person, place, and time.   HENT:   Mouth/Throat: Oropharynx is clear and moist. No oropharyngeal exudate.   Eyes: Conjunctivae are normal. Pupils are equal, round, and reactive to light.   Cardiovascular: Normal rate and regular rhythm.    Murmur heard.  Pulmonary/Chest: Effort normal and breath sounds normal. He has no wheezes.   Abdominal: Soft. Bowel sounds are normal. There is no tenderness.   Musculoskeletal: He exhibits no edema.   Lymphadenopathy:     He has no cervical adenopathy.   Neurological: He is alert and oriented to person,  place, and time.   Skin: No erythema.   Psychiatric: His behavior is normal.       Assessment:       1. Essential hypertension    2. Pure hypercholesterolemia    3. CKD (chronic kidney disease) stage 4, GFR 15-29 ml/min    4. Dizziness        Plan:   Micheal was seen today for follow-up.    Diagnoses and all orders for this visit:    Essential hypertension  Controlled  No low blood pressures on log  Continue current medication  Pure hypercholesterolemia  -     Lipid panel; Future    CKD (chronic kidney disease) stage 4, GFR 15-29 ml/min  Stable  Continue follow up with Dr. Rojo  Dizziness  Resolved  Follow up if dizziness recurs    Patient readiness: acceptance and barriers:none    During the course of the visit the patient was educated and counseled about the following:     Hypertension:   Medication: no change.  Dietary sodium restriction.  Regular aerobic exercise.    Goals: Hypertension: Reduce Blood Pressure    Did patient meet goals/outcomes: Yes    The following self management tools provided: declined    Patient Instructions (the written plan) was given to the patient/family.     Time spent with patient: 15 minutes    Barriers to medications present (no )    Adverse reactions to current medications (no)    Over the counter medications reviewed (Yes)

## 2018-04-25 NOTE — PATIENT INSTRUCTIONS
Established High Blood Pressure    High blood pressure (hypertension) is a chronic disease. Often, healthcare providers dont know what causes it. But it can be caused by certain health conditions and medicines.  If you have high blood pressure, you may not have any symptoms. If you do have symptoms, they may include headache, dizziness, changes in your vision, chest pain, and shortness of breath. But even without symptoms, high blood pressure thats not treated raises your risk for heart attack and stroke. High blood pressure is a serious health risk and shouldnt be ignored.  A blood pressure reading is made up of two numbers: a higher number over a lower number. The top number is the systolic pressure. The bottom number is the diastolic pressure. A normal blood pressure is a systolic pressure of  less than 120 over a diastolic pressure of less than 80. You will see your blood pressure readings written together. For example, a person with a systolic pressure of 188 and a diastolic pressure of 78 will have 118/78 written in the medical record.  High blood pressure is when either the top number is 140 or higher, or the bottom number is 90 or higher. This must be the result when taking your blood pressure a number of times. The blood pressures between normal and high are called prehypertension.  Home care  If you have high blood pressure, you should do what is listed below to lower your blood pressure. If you are taking medicines for high blood pressure, these methods may reduce or end your need for medicines in the future.  · Begin a weight-loss program if you are overweight.  · Cut back on how much salt you get in your diet. Heres how to do this:  ¨ Dont eat foods that have a lot of salt. These include olives, pickles, smoked meats, and salted potato chips.  ¨ Dont add salt to your food at the table.  ¨ Use only small amounts of salt when cooking.  · Start an exercise program. Talk with your healthcare  provider about the type of exercise program that would be best for you. It doesn't have to be hard. Even brisk walking for 20 minutes 3 times a week is a good form of exercise.  · Dont take medicines that stimulate the heart. This includes many over-the-counter cold and sinus decongestant pills and sprays, as well as diet pills. Check the warnings about hypertension on the label. Before buying any over-the-counter medicines or supplements, always ask the pharmacist about the product's potential interaction with your high blood pressure and your high blood pressure medicines.  · Stimulants such as amphetamine or cocaine could be deadly for someone with high blood pressure. Never take these.  · Limit how much caffeine you get in your diet. Switch to caffeine-free products.  · Stop smoking. If you are a long-time smoker, this can be hard. Talk to your healthcare provider about medicines and nicotine replacement options to help you. Also, enroll in a stop-smoking program to make it more likely that you will quit for good.  · Learn how to handle stress. This is an important part of any program to lower blood pressure. Learn about relaxation methods like meditation, yoga, or biofeedback.  · If your provider prescribed medicines, take them exactly as directed. Missing doses may cause your blood pressure get out of control.  · If you miss a dose or doses, check with your healthcare provider or pharmacist about what to do.  · Consider buying an automatic blood pressure machine. Ask your provider for a recommendation. You can get one of these at most pharmacies.     The American Heart Association recommends the following guidelines for home blood pressure monitoring:  · Don't smoke or drink coffee for 30 minutes before taking your blood pressure.  · Go to the bathroom before the test.  · Relax for 5 minutes before taking the measurement.  · Sit with your back supported (don't sit on a couch or soft chair); keep your feet on  the floor uncrossed. Place your arm on a solid flat surface (like a table) with the upper part of the arm at heart level. Place the middle of the cuff directly above the eye of the elbow. Check the monitor's instruction manual for an illustration.  · Take multiple readings. When you measure, take 2 to 3 readings one minute apart and record all of the results.  · Take your blood pressure at the same time every day, or as your healthcare provider recommends.  · Record the date, time, and blood pressure reading.  · Take the record with you to your next medical appointment. If your blood pressure monitor has a built-in memory, simply take the monitor with you to your next appointment.  · Call your provider if you have several high readings. Don't be frightened by a single high blood pressure reading, but if you get several high readings, check in with your healthcare provider.  · Note: When blood pressure reaches a systolic (top number) of 180 or higher OR diastolic (bottom number) of 110 or higher, seek emergency medical treatment.  Follow-up care  You will need to see your healthcare provider regularly. This is to check your blood pressure and to make changes to your medicines. Make a follow-up appointment as directed. Bring the record of your home blood pressure readings to the appointment.  When to seek medical advice  Call your healthcare provider right away if any of these occur:  · Blood pressure reaches a systolic (upper number) of 180 or higher OR a diastolic (bottom number) of 110 or higher  · Chest pain or shortness of breath  · Severe headache  · Throbbing or rushing sound in the ears  · Nosebleed  · Sudden severe pain in your belly (abdomen)  · Extreme drowsiness, confusion, or fainting  · Dizziness or spinning sensation (vertigo)  · Weakness of an arm or leg or one side of the face  · You have problems speaking or seeing   Date Last Reviewed: 12/1/2016  © 8207-8303 [employer]. [address] [place] [address]  [place] [address]. All rights reserved. This information is not intended as a substitute for professional medical care. Always follow your healthcare professional's instructions.            Walking for Fitness  Fitness walking has something for everyone, even people who are already fit. Walking is one of the safest ways to condition your body aerobically. It can boost energy, help you lose weight, and reduce stress.    Physical benefits  · Walking strengthens your heart and lungs, and tones your muscles.  · When walking, your feet land with less impact than in other sports. This reduces chances of muscle, bone, and joint injury.  · Regular walking improves your cholesterol levels and lowers your risk of heart disease. And it helps you control your blood sugar if you have diabetes.  · Walking is a weight-bearing activity, which helps maintain bone density. This can help prevent osteoporosis.  Personal rewards  · Taking walks can help you relax and manage stress. And fitness walking may make you feel better about yourself.  · Walking can help you sleep better at night and make you less likely to be depressed.  · Regular walking may help maintain your memory as you get older.  · Walking is a great way to spend extra time with friends and family members. Be sure to invite your dog along!  Q&A about fitness walking  Q: Will walking keep me fit?  A: Yes. Regular walking at the right pace gives you all the benefits of other aerobic activities, such as jogging and swimming.  Q: Will walking help me lose weight and keep it off?  A: Yes. Per mile, walking can burn as many calories as jogging. Your health care provider can help work walking into your weight-loss plan.  Q: Is walking safe for my health?  A: Yes. Walking is safe if you have high blood pressure, diabetes, heart disease, or other conditions. Talk to your healthcare provider before you start.  Date Last Reviewed: 4/1/2017  © 1646-9800 [employer]. [address] [place] [address], [place] [address]. All rights reserved. This information is not intended as a substitute for professional medical care. Always follow your healthcare professional's instructions.            Weight Management: Getting Started  Healthy bodies come in all shapes and sizes. Not all bodies are made to be thin. For some people, a healthy weight is higher than the average weight listed on weight charts. Your healthcare provider can help you decide on a healthy weight for you.    Reasons to lose weight  Losing weight can help with some health problems, such as high blood pressure, heart disease, diabetes, sleep apnea, and arthritis. You may also feel more energy.  Set your long-term goal  Your goal doesn't even have to be a specific weight. You may decide on a fitness goal (such as being able to walk 10 miles a week), or a health goal (such as lowering your blood pressure). Choose a goal that is measurable and reasonable, so you know when you've reached it. A goal of reaching a BMI of less than 25 is not always reasonable (or possible).   Make an action plan  Habits dont change overnight. Setting your goals too high can leave you feeling discouraged if you cant reach them. Be realistic. Choose one or two small changes you can make now. Set an action plan for how you are going to make these changes. When you can stick to this plan, keep making a few more small changes. Taking small steps will help you stay on the path to success.  Track your progress  Write down your goals. Then, keep a daily record of your progress. Write down what you eat and how active you are. This record lets you look back on how much youve done. It may also help when youre feeling frustrated. Reward yourself for success. Even if you dont reach every goal, give yourself credit for what you do get done.  Get support  Encouragement from others can help make losing weight easier. Ask your family members and  friends for support. They may even want to join you. Also look to your healthcare provider, registered dietitian, and  for help. Your local hospital can give you more information about nutrition, exercise, and weight loss.  Date Last Reviewed: 1/31/2016  © 1243-8225 [employer]. [address] [place] [address], [place] [address]. All rights reserved. This information is not intended as a substitute for professional medical care. Always follow your healthcare professional's instructions.

## 2018-04-27 ENCOUNTER — OFFICE VISIT (OUTPATIENT)
Dept: UROLOGY | Facility: CLINIC | Age: 78
End: 2018-04-27
Payer: MEDICARE

## 2018-04-27 VITALS
BODY MASS INDEX: 29.42 KG/M2 | HEART RATE: 71 BPM | WEIGHT: 222 LBS | TEMPERATURE: 98 F | DIASTOLIC BLOOD PRESSURE: 70 MMHG | SYSTOLIC BLOOD PRESSURE: 129 MMHG | HEIGHT: 73 IN

## 2018-04-27 DIAGNOSIS — R35.0 FREQUENCY OF URINATION: Primary | ICD-10-CM

## 2018-04-27 DIAGNOSIS — N40.0 BENIGN PROSTATIC HYPERPLASIA WITHOUT LOWER URINARY TRACT SYMPTOMS: ICD-10-CM

## 2018-04-27 LAB
BILIRUB SERPL-MCNC: NEGATIVE MG/DL
BLOOD URINE, POC: NORMAL
COLOR, POC UA: NORMAL
GLUCOSE UR QL STRIP: NORMAL
KETONES UR QL STRIP: NEGATIVE
LEUKOCYTE ESTERASE URINE, POC: NEGATIVE
NITRITE, POC UA: NEGATIVE
PH, POC UA: 5
PROTEIN, POC: NORMAL
SPECIFIC GRAVITY, POC UA: 1
UROBILINOGEN, POC UA: NORMAL

## 2018-04-27 PROCEDURE — 99999 PR PBB SHADOW E&M-EST. PATIENT-LVL III: CPT | Mod: PBBFAC,,, | Performed by: UROLOGY

## 2018-04-27 PROCEDURE — 51798 US URINE CAPACITY MEASURE: CPT | Mod: PBBFAC,PN | Performed by: UROLOGY

## 2018-04-27 PROCEDURE — 99213 OFFICE O/P EST LOW 20 MIN: CPT | Mod: PBBFAC,PN,25 | Performed by: UROLOGY

## 2018-04-27 PROCEDURE — 99214 OFFICE O/P EST MOD 30 MIN: CPT | Mod: S$PBB,,, | Performed by: UROLOGY

## 2018-04-27 PROCEDURE — 81002 URINALYSIS NONAUTO W/O SCOPE: CPT | Mod: PBBFAC,PN | Performed by: UROLOGY

## 2018-04-27 RX ORDER — TAMSULOSIN HYDROCHLORIDE 0.4 MG/1
0.4 CAPSULE ORAL DAILY
Qty: 30 CAPSULE | Refills: 11 | Status: ON HOLD | OUTPATIENT
Start: 2018-04-27 | End: 2018-08-29

## 2018-04-27 NOTE — PROGRESS NOTES
Subjective:       Patient ID: Micheal Hunt is a 77 y.o. male.    Chief Complaint:   OFFICE NOTE    CHIEF COMPLAINT:  Urinary frequency with low urinary tract symptoms.    HISTORY OF PRESENT ILLNESS:  Mr. Hunt is a 77-year-old male who has   significant urinary frequency, nocturia x1, no dysuria or hematuria.  The flow   sometime is adequate, sometime is slow.  It is to be noted that the patient was   taking alpha blockers in the form of Hytrin 1 mg p.o. daily.  The Hytrin was   discontinued by his medical doctor, and he refers to have noticed some worsening   of his symptoms.  During the last visit with Hytrin, his postvoid residual   urine was found to be at 51 mL.  In today's visit with no alpha blockers, his   residual urine is 150 mL.    The family history is negative for prostate cancer.  In the past, the patient   had a slight elevation of the PSA.  We have a PSA in 08/2015 of 5.0.  He   underwent prostate biopsies.  The biopsies have failed to show evidence of   prostate cancer.    The past medical and surgical history are well documented in the medical record   and was reviewed by me during this visit including medications and allergies.    It is to be noted that the patient is losing weight and they cannot find a   reason for the weight loss by his family doctors. Recently, the patient has been   diagnosed with stage IV end-stage renal disease, and he is not on dialysis, but   he has been considered as a future dialysis patient.    Last PSA was 5.0 in 08/2015.    The urinalysis today shows 3+ protein and a trace of blood.      EOR/HN  dd: 04/27/2018 11:03:23 (CDT)  td: 04/28/2018 08:07:12 (CDT)  Doc ID   #[id number]  Job ID #[id number]    CC:       HPI  Review of Systems   Constitutional: Positive for unexpected weight change. Negative for activity change and appetite change.   HENT: Negative.    Eyes: Negative for discharge.   Respiratory: Negative for cough and shortness of breath.     Cardiovascular: Negative for chest pain and palpitations.   Gastrointestinal: Negative for abdominal distention, abdominal pain, constipation and vomiting.   Genitourinary: Positive for decreased urine volume and frequency. Negative for discharge, dysuria, flank pain, hematuria, testicular pain and urgency.   Musculoskeletal: Negative for arthralgias.   Skin: Negative for rash.   Neurological: Negative for dizziness.   Psychiatric/Behavioral: The patient is not nervous/anxious.        Objective:      Physical Exam   Constitutional: He appears well-developed and well-nourished.   HENT:   Head: Normocephalic.   Eyes: Pupils are equal, round, and reactive to light.   Neck: Normal range of motion.   Cardiovascular: Normal rate.    Pulmonary/Chest: Effort normal.   Abdominal: Soft. He exhibits no distension and no mass. There is no tenderness. Hernia confirmed negative in the right inguinal area and confirmed negative in the left inguinal area.   Genitourinary: Rectum normal, prostate normal and penis normal. Rectal exam shows no external hemorrhoid, no mass and no tenderness. Prostate is not enlarged and not tender. Right testis shows no tenderness. Left testis shows no mass and no tenderness. No discharge found.         Musculoskeletal: Normal range of motion.   Neurological: He is alert.   Skin: Skin is warm.     Psychiatric: He has a normal mood and affect.       Assessment:       1. Frequency of urination    2. Benign prostatic hyperplasia without lower urinary tract symptoms        Plan:       Frequency of urination  -     POCT URINE DIPSTICK WITHOUT MICROSCOPE  -     Prostate Specific Antigen, Diagnostic; Future; Expected date: 04/27/2018    Benign prostatic hyperplasia without lower urinary tract symptoms  -     Prostate Specific Antigen, Diagnostic; Future; Expected date: 04/27/2018    Other orders  -     tamsulosin (FLOMAX) 0.4 mg Cp24; Take 1 capsule (0.4 mg total) by mouth once daily.  Dispense: 30 capsule;  Refill: 11    Will start on Flomax qd. To be followed by Dr. Payne in 4 mo.

## 2018-04-30 ENCOUNTER — TELEPHONE (OUTPATIENT)
Dept: GASTROENTEROLOGY | Facility: CLINIC | Age: 78
End: 2018-04-30

## 2018-04-30 RX ORDER — LUBIPROSTONE 24 UG/1
24 CAPSULE ORAL 2 TIMES DAILY
Qty: 180 CAPSULE | Refills: 3 | Status: SHIPPED | OUTPATIENT
Start: 2018-04-30 | End: 2018-06-20

## 2018-04-30 NOTE — TELEPHONE ENCOUNTER
Patient instructed Amitza script sent in and it is also not covered by insurance it would be 1100.00 offered to mail recommendations constipation he states no he is yodit joshi ,

## 2018-04-30 NOTE — TELEPHONE ENCOUNTER
----- Message from Chelsey Landeros sent at 4/27/2018  4:52 PM CDT -----  Contact: [phone]  Patient is requesting a call back from the nurse stated medication prescribed is too much, $200+ and need a prescription that's more affordable.  Please call the patient upon request at phone number [phone].

## 2018-05-01 ENCOUNTER — TELEPHONE (OUTPATIENT)
Dept: UROLOGY | Facility: CLINIC | Age: 78
End: 2018-05-01

## 2018-05-01 NOTE — TELEPHONE ENCOUNTER
Patient is taking Hytrin 1mg prescribed by NANCY Gaspar.  The insurance is declining the Flomax due to duplicate therapy.    [facility] will fax the denial and I will contact for PA.

## 2018-05-01 NOTE — TELEPHONE ENCOUNTER
----- Message from Janine Bustos sent at 5/1/2018 11:31 AM CDT -----  Type:  Pharmacy Calling to Clarify an RX    Name of Caller:  Oh  Pharmacy Name:  [facility]  Prescription Name:  Generic Flomax 0.4mg capsule  What do they need to clarify?:  Insurance denial  Best Call Back Number:  [phone]  Additional Information:  Requiring a PA, the doctor needs to do an over ride for duplication with Hydcrin. Please call [phone]

## 2018-05-03 ENCOUNTER — SURGERY (OUTPATIENT)
Age: 78
End: 2018-05-03

## 2018-05-03 ENCOUNTER — TELEPHONE (OUTPATIENT)
Dept: UROLOGY | Facility: CLINIC | Age: 78
End: 2018-05-03

## 2018-05-03 ENCOUNTER — ANESTHESIA (OUTPATIENT)
Dept: ENDOSCOPY | Facility: HOSPITAL | Age: 78
End: 2018-05-03
Payer: MEDICARE

## 2018-05-03 ENCOUNTER — HOSPITAL ENCOUNTER (OUTPATIENT)
Facility: HOSPITAL | Age: 78
Discharge: HOME | End: 2018-05-03
Attending: INTERNAL MEDICINE | Admitting: INTERNAL MEDICINE
Payer: MEDICARE

## 2018-05-03 ENCOUNTER — ANESTHESIA EVENT (OUTPATIENT)
Dept: ENDOSCOPY | Facility: HOSPITAL | Age: 78
End: 2018-05-03
Payer: MEDICARE

## 2018-05-03 DIAGNOSIS — K63.5 POLYP OF COLON, UNSPECIFIED PART OF COLON, UNSPECIFIED TYPE: Primary | ICD-10-CM

## 2018-05-03 DIAGNOSIS — K64.8 INTERNAL HEMORRHOIDS: ICD-10-CM

## 2018-05-03 DIAGNOSIS — K57.30 DIVERTICULOSIS OF LARGE INTESTINE WITHOUT HEMORRHAGE: ICD-10-CM

## 2018-05-03 DIAGNOSIS — Z86.010 HISTORY OF COLON POLYPS: ICD-10-CM

## 2018-05-03 PROCEDURE — 25000003 PHARM REV CODE 250: Performed by: INTERNAL MEDICINE

## 2018-05-03 PROCEDURE — 88305 TISSUE EXAM BY PATHOLOGIST: CPT | Performed by: PATHOLOGY

## 2018-05-03 PROCEDURE — D9220A PRA ANESTHESIA: Mod: PT,ANES,, | Performed by: ANESTHESIOLOGY

## 2018-05-03 PROCEDURE — 63600175 PHARM REV CODE 636 W HCPCS: Performed by: NURSE ANESTHETIST, CERTIFIED REGISTERED

## 2018-05-03 PROCEDURE — 45385 COLONOSCOPY W/LESION REMOVAL: CPT | Performed by: INTERNAL MEDICINE

## 2018-05-03 PROCEDURE — D9220A PRA ANESTHESIA: Mod: PT,CRNA,, | Performed by: NURSE ANESTHETIST, CERTIFIED REGISTERED

## 2018-05-03 PROCEDURE — 88305 TISSUE EXAM BY PATHOLOGIST: CPT | Mod: 26,,, | Performed by: PATHOLOGY

## 2018-05-03 PROCEDURE — 45385 COLONOSCOPY W/LESION REMOVAL: CPT | Mod: PT,,, | Performed by: INTERNAL MEDICINE

## 2018-05-03 PROCEDURE — 27201089 HC SNARE, DISP (ANY): Performed by: INTERNAL MEDICINE

## 2018-05-03 PROCEDURE — 37000009 HC ANESTHESIA EA ADD 15 MINS: Performed by: INTERNAL MEDICINE

## 2018-05-03 PROCEDURE — 37000008 HC ANESTHESIA 1ST 15 MINUTES: Performed by: INTERNAL MEDICINE

## 2018-05-03 RX ORDER — PROPOFOL 10 MG/ML
VIAL (ML) INTRAVENOUS
Status: DISCONTINUED | OUTPATIENT
Start: 2018-05-03 | End: 2018-05-03

## 2018-05-03 RX ORDER — LIDOCAINE HCL/PF 100 MG/5ML
SYRINGE (ML) INTRAVENOUS
Status: DISCONTINUED | OUTPATIENT
Start: 2018-05-03 | End: 2018-05-03

## 2018-05-03 RX ORDER — SODIUM CHLORIDE 9 MG/ML
INJECTION, SOLUTION INTRAVENOUS CONTINUOUS
Status: DISCONTINUED | OUTPATIENT
Start: 2018-05-03 | End: 2018-05-04 | Stop reason: HOSPADM

## 2018-05-03 RX ADMIN — SODIUM CHLORIDE: 0.9 INJECTION, SOLUTION INTRAVENOUS at 09:05

## 2018-05-03 RX ADMIN — PROPOFOL 50 MG: 10 INJECTION, EMULSION INTRAVENOUS at 10:05

## 2018-05-03 RX ADMIN — PROPOFOL 100 MG: 10 INJECTION, EMULSION INTRAVENOUS at 10:05

## 2018-05-03 RX ADMIN — LIDOCAINE HYDROCHLORIDE 50 MG: 20 INJECTION, SOLUTION INTRAVENOUS at 10:05

## 2018-05-03 NOTE — TRANSFER OF CARE
"Anesthesia Transfer of Care Note    Patient: Micheal Hunt    Procedure(s) Performed: Procedure(s) (LRB):  COLONOSCOPY (N/A)    Patient location: PACU    Anesthesia Type: general    Transport from OR: Transported from OR on room air with adequate spontaneous ventilation    Post pain: adequate analgesia    Post assessment: no apparent anesthetic complications and tolerated procedure well    Post vital signs: stable    Level of consciousness: awake    Nausea/Vomiting: no nausea/vomiting    Complications: none    Transfer of care protocol was followed      Last vitals:   Visit Vitals  /73 (BP Location: Left arm, Patient Position: Lying)   Pulse 72   Temp 36.4 °C (97.5 °F)   Resp 16   Ht 6' 1" (1.854 m)   Wt 95.7 kg (211 lb)   SpO2 95%   BMI 27.84 kg/m²     "

## 2018-05-03 NOTE — ANESTHESIA POSTPROCEDURE EVALUATION
"Anesthesia Post Evaluation    Patient: Micheal Hunt    Procedure(s) Performed: Procedure(s) (LRB):  COLONOSCOPY (N/A)    Final Anesthesia Type: general  Patient location during evaluation: PACU  Patient participation: Yes- Able to Participate  Level of consciousness: awake and alert  Post-procedure vital signs: reviewed and stable  Pain management: adequate  Airway patency: patent  PONV status at discharge: No PONV  Anesthetic complications: no      Cardiovascular status: hemodynamically stable  Respiratory status: unassisted and room air  Hydration status: euvolemic  Follow-up not needed.        Visit Vitals  /64   Pulse (!) 56   Temp 36.4 °C (97.5 °F)   Resp 18   Ht 6' 1" (1.854 m)   Wt 95.7 kg (211 lb)   SpO2 98%   BMI 27.84 kg/m²       Pain/Sonny Score: Pain Assessment Performed: Yes (5/3/2018 11:30 AM)  Presence of Pain: denies (5/3/2018 11:30 AM)  Sonny Score: 10 (5/3/2018 11:30 AM)      "

## 2018-05-03 NOTE — PLAN OF CARE
Pt dressed and has belongings, IV removed with catheter intact, drank a cranberry juice and now sitting in wheelchair in End[facility] Pre Op with ANTONIO Richter waiting for daughter to arrive for d/c. Pt denies pain and is in no distress.

## 2018-05-03 NOTE — TELEPHONE ENCOUNTER
Left message for patient to call back regarding medication.      Patient is currently taking hytrin and Flomax will be duplicate therapy.  Dr. Herring recommends patient stop the Hytrin and take the Flomax.

## 2018-05-03 NOTE — DISCHARGE INSTRUCTIONS
High-Fiber Diet  Fiber is in fruits, vegetables, cereals, and grains. Fiber passes through your body undigested. A high-fiber diet helps food move through your intestinal tract. The added bulk is helpful in preventing constipation. In people with diverticulosis, fiber helps clean out the pouches along the colon wall. It also prevents new pouches from forming. A high-fiber diet reduces the risk of colon cancer. It also lowers blood cholesterol and prevents high blood sugar in people with diabetes.    The fiber-rich foods listed below should be part of your diet. If you are not used to high-fiber foods, start with 1 or 2 foods from this list. Every 3 to 4 days add a new one to your diet. Do this until you are eating 4 high-fiber foods per day. This should give you 20 to 35 grams of fiber a day. It is also important to drink a lot of water when you are on this diet. You should have 6 to 8 glasses of water a day. Water makes the fiber swell and increases the benefit.  Foods high in dietary fiber  The following foods are high in dietary fiber:  · Breads. Breads made with 100% whole-wheat flour; marianne, wheat, or rye crackers; whole-grain tortillas, bran muffins.  · Cereals. Whole-grain and bran cereals with bran (shredded wheat, wheat flakes, raisin bran, corn bran); oatmeal, rolled oats, granola, and brown rice.  · Fruits. Fresh fruits and their edible skins (pears, prunes, raisins, berries, apples, and apricots); bananas, citrus fruit, mangoes, pineapple; and prune juice.  · Nuts. Any nuts and seeds.  · Vegetables. Best served raw or lightly cooked. All types, especially: green peas, celery, eggplant, potatoes, spinach, broccoli, [place] sprouts, winter squash, carrots, cauliflower, soybeans, lentils, and fresh and dried beans of all kinds.  · Other. Popcorn, any spices.  Date Last Reviewed: 8/1/2016  © 9650-6514 [employer]. [address] [place] [address], [place] [address]. All rights reserved. This  information is not intended as a substitute for professional medical care. Always follow your healthcare professional's instructions.        Understanding Colon and Rectal Polyps    The colon (also called the large intestine) is a muscular tube that forms the last part of the digestive tract. It absorbs water and stores food waste. The colon is about 4 to 6 feet long. The rectum is the last 6 inches of the colon. The colon and rectum have a smooth lining composed of millions of cells. Changes in these cells can lead to growths in the colon that can become cancerous and should be removed. Multiple tests are available to screen for colon cancer, but the colonoscopy is the most recommended test. During colonoscopy, these polyps can be removed. How often you need this test depends on many things including your condition, your family history, symptoms, and what the findings were at the previous colonoscopy.   When the colon lining changes  Changes that happen in the cells that line the colon or rectum can lead to growths called polyps. Over a period of years, polyps can turn cancerous. Removing polyps early may prevent cancer from ever forming.  Polyps  Polyps are fleshy clumps of tissue that form on the lining of the colon or rectum. Small polyps are usually benign (not cancerous). However, over time, cells in a polyp can change and become cancerous. Certain types of polyps known as adenomatous polyps are premalignant. The risk for invasive cancer increases with the size of the polyp and certain cell and gene features. This means that they can become cancerous if they're not removed. Hyperplastic polyps are benign. They can grow quite large and not turn cancerous.   Cancer  Almost all colorectal cancers start when polyp cells begin growing abnormally. As a cancerous tumor grows, it may involve more and more of the colon or rectum. In time, cancer can also grow beyond the colon or rectum and spread to nearby organs or to  glands called lymph nodes. The cells can also travel to other parts of the body. This is known as metastasis. The earlier a cancerous tumor is removed, the better the chance of preventing its spread.    Date Last Reviewed: 8/1/2016 © 2000-2017 [employer]. [address] [place] [address]. All rights reserved. This information is not intended as a substitute for professional medical care. Always follow your healthcare professional's instructions.        Diverticulosis    Diverticulosis means that small pouches have formed in the wall of your large intestine (colon). Most often, this problem causes no symptoms and is common as people age. But the pouches in the colon are at risk of becoming infected. When this happens, the condition is called diverticulitis. Although most people with diverticulosis never develop diverticulitis, it is still not uncommon. Rectal bleeding can also occur and in less common situations, a type of colon inflammation called colitis.  While most people do not have symptoms, some people with diverticulosis may have:  · Abdominal cramps and pain  · Bloating  · Constipation  · Change in bowel habits  Causes  The exact cause of diverticulosis (and diverticulitis) has not been proved, but a few things are associated with the condition:  · Low-fiber diet  · Constipation  · Lack of exercise  Your healthcare provider will talk with you about how to manage your condition. Diet changes may be all that are needed to help control diverticulosis and prevent progression to diverticulitis. If you develop diverticulitis, you will likely need other treatments.  Home care  You may be told to take fiber supplements daily. Fiber adds bulk to the stool so that it passes through the colon more easily. Stool softeners may be recommended. You may also be given medications for pain relief. Be sure to take all medications as directed.  In the past, people were told to avoid corn, nuts, and  seeds. This is no longer necessary.  Follow these guidelines when caring for yourself at home:  · Eat unprocessed foods that are high in fiber. Whole grains, fruits, and vegetables are good choices.  · Drink 6 to 8 glasses of water every day unless your healthcare provider has you limit how much fluid you should have.  · Watch for changes in your bowel movements. Tell your provider if you notice any changes.  · Begin an exercise program. Ask your provider how to get started. Generally, walking is the best.  · Get plenty of rest and sleep.  Follow-up care  Follow up with your healthcare provider, or as advised. Regular visits may be needed to check on your health. Sometimes special procedures such as colonoscopy, are needed after an episode of diverticulitis or blooding. Be sure to keep all your appointments.  If a stool sample was taken, or cultures were done, you should be told if they are positive, or if your treatment needs to be changed. You can call as directed for the results.  If X-rays were done, a radiologist will look at them. You will be told if there is a change in your treatment.  If antibiotics were prescribed, be sure to finish them all.  When to seek medical advice  Call your healthcare provider right away if any of these occur:  · Fever of 100.4°F (38°C) or higher, or as directed by your healthcare provider  · Severe cramps in the lower left side of the abdomen or pain that is getting worse  · Tenderness in the lower left side of the abdomen or worsening pain throughout the abdomen  · Diarrhea or constipation that doesn't get better within 24 hours  · Nausea and vomiting  · Bleeding from the rectum  Call 911  Call emergency services if any of the following occur:  · Trouble breathing  · Confusion  · Very drowsy or trouble awakening  · Fainting or loss of consciousness  · Rapid heart rate  · Chest pain  Date Last Reviewed: 12/30/2015  © 6784-2604 [employer]. [address] [facility],  [place] [address]. All rights reserved. This information is not intended as a substitute for professional medical care. Always follow your healthcare professional's instructions.        Hemorrhoids    Hemorrhoids are swollen and inflamed veins inside the rectum and near the anus. The rectum is the last several inches of the colon. The anus is the passage between the rectum and the outside of the body.  Causes  The veins can become swollen due to increased pressure in them. This is most often caused by:  · Chronic constipation or diarrhea  · Straining when having a bowel movement  · Sitting too long on the toilet  · A low-fiber diet  · Pregnancy  Symptoms  · Bleeding from the rectum (this may be noticeable after bowel movements)  · Lump near the anus  · Itching around the anus  · Pain around the anus  There are different types of hemorrhoids. Depending on the type you have and the severity, you may be able to treat yourself at home. In some cases, a procedure may be the best treatment option. Your healthcare provider can tell you more about this, if needed.  Home care  General care  · To get relief from pain or itching, try:  ¨ Topical products. Your healthcare provider may prescribe or recommend creams, ointments, or pads that can be applied to the hemorrhoid. Use these exactly as directed.  ¨ Medicines. Your healthcare provider may recommend stool softeners, suppositories, or laxatives to help manage constipation. Use these exactly as directed.  ¨ Sitz baths. A sitz bath involves sitting in a few inches of warm bath water. Be careful not to make the water so hot that you burn yourself--test it before sitting in it. Soak for about 10 to 15 minutes a few times a day. This may help relieve pain.  Tips to help prevent hemorrhoids  · Eat more fiber. Fiber adds bulk to stool and absorbs water as it moves through your colon. This makes stool softer and easier to pass.  ¨ Increase the fiber in your diet with more  fiber-rich foods. These include fresh fruit, vegetables, and whole grains.  ¨ Take a fiber supplement or bulking agent, if advised to by your provider. These include products such as psyllium or methylcellulose.  · Drink plenty of water, if directed to by your provider. This can help keep stool soft.  · Be more active. Frequent exercise aids digestion and helps prevent constipation. It may also help make bowel movements more regular.  · Dont strain during bowel movements. This can make hemorrhoids more likely. Also, dont sit on the toilet for long periods of time.  Follow-up care  Follow up with your healthcare provider, or as advised. If a culture or imaging tests were done, you will be notified of the results when they are ready. This may take a few days or longer.  When to seek medical advice  Call your healthcare provider right away if any of these occur:  · Increased bleeding from the rectum  · Increased pain around the rectum or anus  · Weakness or dizziness  Call 911  Call 911 or return to the emergency department right away if any of these occur:  · Trouble breathing or swallowing  · Fainting or loss of consciousness  · Unusually fast heart rate  · Vomiting blood  · Large amounts of blood in stool  Date Last Reviewed: 6/22/2015  © 9586-6029 [employer]. [address] [place] [address], [place] [address]. All rights reserved. This information is not intended as a substitute for professional medical care. Always follow your healthcare professional's instructions.        Colonoscopy     A camera attached to a flexible tube with a viewing lens is used to take video pictures.     Colonoscopy is a test to view the inside of your lower digestive tract (colon and rectum). Sometimes it can show the last part of the small intestine (ileum). During the test, small pieces of tissue may be removed for testing. This is called a biopsy. Small growths, such as polyps, may also be removed.   Why is colonoscopy  done?  The test is done to help look for colon cancer. And it can help find the source of abdominal pain, bleeding, and changes in bowel habits. It may be needed once a year, depending on factors such as your:  · Age  · Health history  · Family health history  · Symptoms  · Results from any prior colonoscopy  Risks and possible complications  These include:  · Bleeding               · A puncture or tear in the colon   · Risks of anesthesia  · A cancer lesion not being seen  Getting ready   To prepare for the test:  · Talk with your healthcare provider about the risks of the test (see below). Also ask your healthcare provider about alternatives to the test.  · Tell your healthcare provider about any medicines you take. Also tell him or her about any health conditions you may have.  · Make sure your rectum and colon are empty for the test. Follow the diet and bowel prep instructions exactly. If you dont, the test may need to be rescheduled.  · Plan for a friend or family member to drive you home after the test.     Colonoscopy provides an inside view of the entire colon.     You may discuss the results with your doctor right away or at a future visit.  During the test   The test is usually done in the hospital on an outpatient basis. This means you go home the same day. The procedure takes about 30 minutes. During that time:  · You are given relaxing (sedating) medicine through an IV line. You may be drowsy, or fully asleep.  · The healthcare provider will first give you a physical exam to check for anal and rectal problems.  · Then the anus is lubricated and the scope inserted.  · If you are awake, you may have a feeling similar to needing to have a bowel movement. You may also feel pressure as air is pumped into the colon. Its OK to pass gas during the procedure.  · Biopsy, polyp removal, or other treatments may be done during the test.  After the test   You may have gas right after the test. It can help to try to  pass it to help prevent later bloating. Your healthcare provider may discuss the results with you right away. Or you may need to schedule a follow-up visit to talk about the results. After the test, you can go back to your normal eating and other activities. You may be tired from the sedation and need to rest for a few hours.  Date Last Reviewed: 11/1/2016  © 7086-3067 [employer]. [address] [place] [address], [place] [address]. All rights reserved. This information is not intended as a substitute for professional medical care. Always follow your healthcare professional's instructions.

## 2018-05-03 NOTE — H&P (VIEW-ONLY)
Subjective:       Patient ID: Micheal Hunt is a 77 y.o. male.    This is an established patient.      Chief Complaint: GERD    PAST HISTORY:    Gastroesophageal Reflux   He complains of abdominal pain (epigastric) and nausea. He reports no chest pain, no coughing, no dysphagia, no heartburn or no wheezing. + bloating, indigestion. This is a recurrent problem. The current episode started more than 1 month ago. The problem occurs frequently. The problem has been resolved (Now resolved since course of antibiotics used to treat sinusitis). Nothing aggravates the symptoms. Pertinent negatives include no anemia, fatigue, melena or weight loss. He has tried an antibiotic and a PPI for the symptoms. The treatment provided significant relief. Past procedures include an EGD. Had EGD which showed antral nodule which has been evaluated/surveyed with EUS at main campus;  Colonoscopy reviewed and showed multiple polyps.   We discussed the number and type of polyps that he had and the possibility of testing for hereditary cancer syndromes/polyposis syndromes.      INTERVAL HISTORY:  Since last visit, he admits to constipation, chronic, remote onset, ongoing, requiring multiple medications to facilitate BMs, associated with small hard BMs which are infrequent.  He denies bleeding or weight loss.  GERD is controlled.  He also has chronic anemia which has been worked up.  He had EUS with Dr. Brooks last year for gastric nodule and repeat EUS is recommended in 2019.  He has had multiple villous adenomas in the past with most recent colonoscopy being 3 years ago.  Genetic testing for a hereditary colon cancer syndrome was unrevealing.      Review of Systems   Constitutional: Negative for chills, fatigue, fever and weight loss.   Eyes: Negative for pain and visual disturbance.   Respiratory: Negative for cough, shortness of breath and wheezing.    Cardiovascular: Negative for chest pain and palpitations.   Gastrointestinal:  "Positive for abdominal pain (epigastric), constipation and nausea. Negative for diarrhea, dysphagia, heartburn, melena and vomiting.   Skin: Negative for color change and rash.       Objective:       Vitals:    04/25/18 1430   BP: 135/69   Pulse: (!) 55   Weight: 100.6 kg (221 lb 12.5 oz)   Height: 6' 1" (1.854 m)       Physical Exam   Constitutional: He is oriented to person, place, and time. He appears well-developed and well-nourished.   HENT:   Head: Normocephalic and atraumatic.   Eyes: Pupils are equal, round, and reactive to light. No scleral icterus.   Cardiovascular: Normal rate and regular rhythm.    No murmur heard.  Pulmonary/Chest: Effort normal and breath sounds normal. He has no wheezes.   Abdominal: Soft. Bowel sounds are normal. He exhibits no distension. There is tenderness (vague).   Obese     Neurological: He is alert and oriented to person, place, and time.   Psychiatric: He has a normal mood and affect. His behavior is normal.   Vitals reviewed.        Lab Results   Component Value Date    WBC 5.50 04/13/2018    HGB 10.0 (L) 04/13/2018    HCT 29.3 (L) 04/13/2018    MCV 92 04/13/2018     04/13/2018         Chemistry        Component Value Date/Time     04/13/2018 0938     04/13/2018 0938    K 4.1 04/13/2018 0938    K 4.1 04/13/2018 0938     04/13/2018 0938     04/13/2018 0938    CO2 24 04/13/2018 0938    CO2 24 04/13/2018 0938    BUN 41 (H) 04/13/2018 0938    BUN 41 (H) 04/13/2018 0938    CREATININE 4.2 (H) 04/13/2018 0938    CREATININE 4.2 (H) 04/13/2018 0938    CREATININE 1.5 (H) 08/08/2013 0909    GLU 99 04/13/2018 0938    GLU 99 04/13/2018 0938        Component Value Date/Time    CALCIUM 9.5 04/13/2018 0938    CALCIUM 9.5 04/13/2018 0938    CALCIUM 9.0 08/08/2013 0909    ALKPHOS 78 04/13/2018 0938    AST 17 04/13/2018 0938    ALT 11 04/13/2018 0938    BILITOT 0.3 04/13/2018 0938          Colonoscopy and pathology reports reviewed.      Assessment:       1. LV " dysfunction, EF 50%, reduced to 34%    2. Anemia due to stage 4 chronic kidney disease    3. Sigmoid diverticulitis    4. Personal history of colonic polyps    5. Gastroesophageal reflux disease with esophagitis    6. Gastric nodule    7. Chronic constipation        Plan:       1.  Trial of linzess  2.  Recommend daily exercise, adequate water intake, and high fiber diet.  Recommend daily miralax (17g PO once or twice daily) with intermittently dosed dulcolax (every 2-3 days)  to facilitate bowel movements.  If no relief with this, consider adding emollient laxative (castor oil or mineral oil) +/- enema.  3.  Schedule colonoscopy]  4.  Follow up EUS due in 2019 for gastric nodule  5.  Further recommendations to follow after above.

## 2018-05-03 NOTE — PLAN OF CARE
Dr. Harris at the bedside speaking to pt. Discharge instructions given to pt and he verbalized understanding. Waiting on pt's daughter to arrive around 12:30. Ready for discharge for when daughter arrives.

## 2018-05-03 NOTE — PROVATION PATIENT INSTRUCTIONS
Discharge Summary/Instructions after an Endoscopic Procedure  Patient Name: Micheal Hunt  Patient MRN: [id number]  Patient YOB: 1940  Thursday, May 03, 2018  Messi Madrid MD  RESTRICTIONS:  During your procedure today, you received medications for sedation.  These   medications may affect your judgment, balance and coordination.  Therefore,   for 24 hours, you have the following restrictions:   - DO NOT drive a car, operate machinery, make legal/financial decisions,   sign important papers or drink alcohol.    ACTIVITY:  The following day: return to full activity including work, except no heavy   lifting, straining or running for 3 days if polyps were removed.  DIET:  Eat and drink normally unless instructed otherwise.     TREATMENT FOR COMMON SIDE EFFECTS:  - Mild abdominal pain, nausea, belching, bloating or excessive gas:  rest,   eat lightly and use a heating pad.  - Sore Throat: treat with throat lozenges and/or gargle with warm salt   water.  - Because air was used during the procedure, expelling large amounts of air   from your rectum or belching is normal.  - If a bowel prep was taken, you may not have a bowel movement for 1-3 days.    This is normal.  SYMPTOMS TO WATCH FOR AND REPORT TO YOUR PHYSICIAN:  1. Abdominal pain or bloating, other than gas cramps.  2. Chest pain.  3. Back pain.  4. Signs of infection such as: chills or fever occurring within 24 hours   after the procedure.  5. Rectal bleeding, which would show as bright red, maroon, or black stools.   (A tablespoon of blood from the rectum is not serious, especially if   hemorrhoids are present.)  6. Vomiting.  7. Weakness or dizziness.  GO DIRECTLY TO THE NEAREST EMERGENCY ROOM IF YOU HAVE ANY OF THE FOLLOWING:      Difficulty breathing              Chills and/or fever over 101 F   Persistent vomiting and/or vomiting blood   Severe abdominal pain   Severe chest pain   Black, tarry stools   Bleeding- more than one tablespoon   Any  other symptom or condition that you feel may need urgent attention  Your doctor recommends these additional instructions:  If any biopsies were taken, your doctors clinic will contact you in 1 to 2   weeks with any results.  - Patient has a contact number available for emergencies.  The signs and   symptoms of potential delayed complications were discussed with the   patient.  Return to normal activities tomorrow.  Written discharge   instructions were provided to the patient.   - High fiber diet.   - Continue present medications.   - Await pathology results.   - Repeat colonoscopy in 3 years for surveillance.   - Discharge patient to home (ambulatory).   - Return to my office PRN.  For questions, problems or results please call your physician - Messi Madrid MD at Work:  [phone].  [facility], EMERGENCY ROOM PHONE NUMBER: [phone]  IF A COMPLICATION OR EMERGENCY SITUATION ARISES AND YOU ARE UNABLE TO REACH   YOUR PHYSICIAN - GO DIRECTLY TO THE EMERGENCY ROOM.  Messi Madrid MD  5/3/2018 10:52:38 AM  This report has been verified and signed electronically.

## 2018-05-03 NOTE — ANESTHESIA PREPROCEDURE EVALUATION
05/03/2018  Micheal Hunt is a 77 y.o., male.    Anesthesia Evaluation    I have reviewed the Patient Summary Reports.    I have reviewed the Nursing Notes.   I have reviewed the Medications.     Review of Systems  Anesthesia Hx:  No problems with previous Anesthesia    Social:  Non-Smoker    Hematology/Oncology:  Hematology Normal   Oncology Normal     EENT/Dental:EENT/Dental Normal   Cardiovascular:   Hypertension CAD  CABG/stent  hyperlipidemia    Pulmonary:   Asthma Sleep Apnea    Renal/:   Chronic Renal Disease    Hepatic/GI:   Bowel Prep. GERD    Musculoskeletal:   Arthritis         Physical Exam  General:  Well nourished    Airway/Jaw/Neck:  Airway Findings: Mouth Opening: Normal Tongue: Normal  General Airway Assessment: Adult  Mallampati: II        Eyes/Ears/Nose:  EYES/EARS/NOSE FINDINGS: Normal   Dental:  DENTAL FINDINGS: Normal   Chest/Lungs:  Chest/Lungs Findings: Clear to auscultation, Normal Respiratory Rate     Heart/Vascular:  Heart Findings: Rate: Normal  Rhythm: Regular Rhythm  Sounds: Normal  Heart Murmur  Systolic  Systolic Heart Murmur Description: L Upper Sternal Border  Systolic Heart Murmur Grade: Grade III        Mental Status:  Mental Status Findings:  Cooperative, Alert and Oriented         Anesthesia Plan  Type of Anesthesia, risks & benefits discussed:  Anesthesia Type:  general  Patient's Preference:   Intra-op Monitoring Plan: standard ASA monitors  Intra-op Monitoring Plan Comments:   Post Op Pain Control Plan:   Post Op Pain Control Plan Comments:   Induction:   IV  Beta Blocker:  Patient is on a Beta-Blocker and has received one dose within the past 24 hours (No further documentation required).       Informed Consent: Patient understands risks and agrees with Anesthesia plan.  Questions answered. Anesthesia consent signed with patient.  ASA Score: 3     Day of  Surgery Review of History & Physical: I have interviewed and examined the patient. I have reviewed the patient's H&P dated:  There are no significant changes.  H&P update referred to the provider.         Ready For Surgery From Anesthesia Perspective.

## 2018-05-04 VITALS
DIASTOLIC BLOOD PRESSURE: 73 MMHG | HEART RATE: 66 BPM | WEIGHT: 211 LBS | HEIGHT: 73 IN | RESPIRATION RATE: 17 BRPM | OXYGEN SATURATION: 97 % | BODY MASS INDEX: 27.96 KG/M2 | SYSTOLIC BLOOD PRESSURE: 147 MMHG | TEMPERATURE: 98 F

## 2018-05-07 ENCOUNTER — TELEPHONE (OUTPATIENT)
Dept: PHYSICAL MEDICINE AND REHAB | Facility: CLINIC | Age: 78
End: 2018-05-07

## 2018-05-07 NOTE — TELEPHONE ENCOUNTER
----- Message from Nicole Rosario sent at 5/7/2018  8:31 AM CDT -----  Contact: Melodeohart  Appointment Request From: Micheal Hunt    With Provider: Yanira Whitehead DO [[facility]]    Would Accept With:Only the person I've selected    Preferred Date Range: From 5/7/2018 To 5/11/2018    Preferred Times: Any    Reason for visit: Request an Appt    Comments:  sholder pain

## 2018-05-09 ENCOUNTER — HOSPITAL ENCOUNTER (OUTPATIENT)
Dept: RADIOLOGY | Facility: HOSPITAL | Age: 78
Discharge: HOME | End: 2018-05-09
Attending: PHYSICAL MEDICINE & REHABILITATION
Payer: MEDICARE

## 2018-05-09 ENCOUNTER — PATIENT MESSAGE (OUTPATIENT)
Dept: GASTROENTEROLOGY | Facility: CLINIC | Age: 78
End: 2018-05-09

## 2018-05-09 ENCOUNTER — OFFICE VISIT (OUTPATIENT)
Dept: PHYSICAL MEDICINE AND REHAB | Facility: CLINIC | Age: 78
End: 2018-05-09
Payer: MEDICARE

## 2018-05-09 VITALS
WEIGHT: 211 LBS | SYSTOLIC BLOOD PRESSURE: 132 MMHG | HEIGHT: 73 IN | HEART RATE: 58 BPM | BODY MASS INDEX: 27.96 KG/M2 | DIASTOLIC BLOOD PRESSURE: 62 MMHG

## 2018-05-09 DIAGNOSIS — M61.9 CALCIFICATION AND OSSIFICATION OF MUSCLE, UNSPECIFIED: Primary | ICD-10-CM

## 2018-05-09 DIAGNOSIS — M61.9 CALCIFICATION AND OSSIFICATION OF MUSCLE, UNSPECIFIED: ICD-10-CM

## 2018-05-09 PROCEDURE — 99214 OFFICE O/P EST MOD 30 MIN: CPT | Mod: S$PBB,,, | Performed by: PHYSICAL MEDICINE & REHABILITATION

## 2018-05-09 PROCEDURE — 73030 X-RAY EXAM OF SHOULDER: CPT | Mod: 26,LT,, | Performed by: RADIOLOGY

## 2018-05-09 PROCEDURE — 99214 OFFICE O/P EST MOD 30 MIN: CPT | Mod: PBBFAC,25,PN | Performed by: PHYSICAL MEDICINE & REHABILITATION

## 2018-05-09 PROCEDURE — 73030 X-RAY EXAM OF SHOULDER: CPT | Mod: TC,FY,LT

## 2018-05-09 PROCEDURE — 99999 PR PBB SHADOW E&M-EST. PATIENT-LVL IV: CPT | Mod: PBBFAC,,, | Performed by: PHYSICAL MEDICINE & REHABILITATION

## 2018-05-09 NOTE — PROGRESS NOTES
HPI:  Patient is a 77 y.o. year old male w. Left knee pain s/p TKR. He received benefit from saph. Nerve block+hydrodissection in July, 07. Knee pain has returned. He also states he has noticed a bump developing on his shoulder on the left side.He had rtc repair on his left side in the 90's. It does not hurt and is similar in appearance to a ganglion he once had in  His right hand.      Past Medical History:   Diagnosis Date    Allergy     Arthritis     Gout    BPH (benign prostatic hyperplasia)     CAD (coronary artery disease) 2006    Chronic kidney disease     due to ibuprofen    Colon polyp     Diverticulosis     GERD (gastroesophageal reflux disease)     HTN (hypertension) 3/27/2012    Hyperlipidemia     LVH (left ventricular hypertrophy)     Murmur, cardiac 3/27/2012    GRICELDA (obstructive sleep apnea)     DOES NOT USE A MACHINE    Sinus problem     Syncope and collapse      Past Surgical History:   Procedure Laterality Date    APPENDECTOMY      CARDIAC CATHETERIZATION      COLONOSCOPY  2011    COLONOSCOPY N/A 5/3/2018    Procedure: COLONOSCOPY;  Surgeon: Messi Harris MD;  Location: [facility];  Service: Endoscopy;  Laterality: N/A;    CORONARY ARTERY BYPASS GRAFT  4/2007    x 1    JOINT REPLACEMENT      left knee total replacement  X 3    mid leftt finger      from a cactuss    MOLE REMOVAL  2016    rotative cuff      no rotative cuffs on bilat shoulders has pins     SHOULDER SURGERY      shoulder surgery bilat  RIGHT X 4; LEFT X 3     Family History   Problem Relation Age of Onset    Heart disease Mother     Sudden death Father     Stroke Sister     Heart disease Sister     Kidney cancer Neg Hx     Prostate cancer Neg Hx     Urolithiasis Neg Hx     Allergic rhinitis Neg Hx     Allergies Neg Hx     Angioedema Neg Hx     Asthma Neg Hx     Atopy Neg Hx     Eczema Neg Hx     Immunodeficiency Neg Hx     Rhinitis Neg Hx     Urticaria Neg Hx      Social History     Social  History    Marital status:      Spouse name: N/A    Number of children: N/A    Years of education: N/A     Social History Main Topics    Smoking status: Never Smoker    Smokeless tobacco: Never Used    Alcohol use Yes      Comment: rare    Drug use: No    Sexual activity: Not on file     Other Topics Concern    Not on file     Social History Narrative    No narrative on file       Review of patient's allergies indicates:   Allergen Reactions    Ace inhibitors Other (See Comments)     Cough    Arb-angiotensin receptor antagonist Itching    Eplerenone Other (See Comments)     Marked bradycardia, 40, tiredness and weakness      Sulfa (sulfonamide antibiotics) Itching       Current Outpatient Prescriptions:     albuterol 90 mcg/actuation inhaler, 2 puffs every 4 hours as needed for cough, wheeze, or shortness of breath, Disp: 3 Inhaler, Rfl: 3    albuterol-ipratropium 2.5mg-0.5mg/3mL (DUO-NEB) 0.5 mg-3 mg(2.5 mg base)/3 mL nebulizer solution, INHALE 1 VIAL BY NEBULIZER EVERY 6 HOURS AS NEEDED FOR WHEEZING, Disp: 270 mL, Rfl: 0    allopurinol (ZYLOPRIM) 300 MG tablet, Take 1 tablet (300 mg total) by mouth once daily., Disp: 90 tablet, Rfl: 3    amLODIPine (NORVASC) 10 MG tablet, TAKE 1 TABLET BY MOUTH BEFORE DINNER., Disp: 90 tablet, Rfl: 3    aspirin (ECOTRIN) 81 MG EC tablet, Take 81 mg by mouth once daily.  , Disp: , Rfl:     atorvastatin (LIPITOR) 80 MG tablet, TAKE 1 TABLET (80 MG TOTAL) BY MOUTH ONCE DAILY., Disp: 90 tablet, Rfl: 3    budesonide-formoterol 160-4.5 mcg (SYMBICORT) 160-4.5 mcg/actuation HFAA, Inhale 2 puffs into the lungs every 12 (twelve) hours. Controller, Disp: 3 Inhaler, Rfl: 3    calcitRIOL (ROCALTROL) 0.25 MCG Cap, , Disp: , Rfl:     calcium-vitamin D 500-125 mg-unit tablet, Take 1 tablet by mouth as needed. , Disp: , Rfl:     carvedilol (COREG) 6.25 MG tablet, TAKE 1 TABLET (6.25 MG TOTAL) BY MOUTH 2 (TWO) TIMES DAILY WITH MEALS., Disp: 90 tablet, Rfl: 3     cyanocobalamin, vitamin B-12, (VITAMIN B-12) 1,000 mcg Subl, Take 1 tablet by mouth daily as needed. Takes 3,000mcg, Disp: , Rfl:     finasteride (PROSCAR) 5 mg tablet, TAKE 1 TABLET ONCE DAILY., Disp: 90 tablet, Rfl: 3    fluticasone (FLONASE) 50 mcg/actuation nasal spray, 2 sprays (100 mcg total) by Each Nare route once daily., Disp: 3 Bottle, Rfl: 3    lubiprostone (AMITIZA) 24 MCG Cap, Take 1 capsule (24 mcg total) by mouth 2 (two) times daily., Disp: 180 capsule, Rfl: 3    montelukast (SINGULAIR) 10 mg tablet, Take 1 tablet (10 mg total) by mouth every evening., Disp: 90 tablet, Rfl: 3    sodium chloride (SALINE NASAL MIST) 3 % Mist, 1 spray by Nasal route 2 (two) times daily., Disp: , Rfl:     tamsulosin (FLOMAX) 0.4 mg Cp24, Take 1 capsule (0.4 mg total) by mouth once daily., Disp: 30 capsule, Rfl: 11    traMADol (ULTRAM) 50 mg tablet, Take 1 tablet (50 mg total) by mouth every 8 (eight) hours as needed (No more than 3 per day)., Disp: 90 tablet, Rfl: 1    valsartan (DIOVAN) 320 MG tablet, Take 1 tablet (320 mg total) by mouth once daily., Disp: 90 tablet, Rfl: 3    zolpidem (AMBIEN) 5 MG Tab, TAKE 1 TABLET (5 MG TOTAL) BY MOUTH NIGHTLY AS NEEDED., Disp: 30 tablet, Rfl: 5    nitroGLYCERIN (NITROSTAT) 0.4 MG SL tablet, Place 1 tablet (0.4 mg total) under the tongue every 5 (five) minutes as needed for Chest pain., Disp: 25 tablet, Rfl: 4        Review of Systems  No nausea, vomiting, fevers, Chills , contipation, diarrhea or sweats      Physical Exam:      Vitals:    05/09/18 1054   BP: 132/62   Pulse: (!) 58     alert and oriented ×4 follows commands answers all questions appropriately,affect wnl  Manual muscle test 5 out of 5 sensation to light touch grossly intact  No tenderness left bony protrubance at distal end of clavicle, solid, non compressable  Dec rom left shoulder all directions  +impingement 90deg in abduction and fwd flexion  +hawkin's +mary lou  Nl gait  -slR  No  C/C/E      Assessment:  Left saphenous neuralgia s/p TKR  Left bony protrubance at end of clavicle    Plan:  Saph. Nerve block+hydrodissection helped pain in July. Will repeat  Will get shoulder xray left

## 2018-05-10 ENCOUNTER — OFFICE VISIT (OUTPATIENT)
Dept: CARDIOLOGY | Facility: CLINIC | Age: 78
End: 2018-05-10
Payer: MEDICARE

## 2018-05-10 VITALS
RESPIRATION RATE: 16 BRPM | HEART RATE: 72 BPM | WEIGHT: 220.69 LBS | SYSTOLIC BLOOD PRESSURE: 161 MMHG | DIASTOLIC BLOOD PRESSURE: 67 MMHG | HEIGHT: 73 IN | BODY MASS INDEX: 29.25 KG/M2 | OXYGEN SATURATION: 98 %

## 2018-05-10 DIAGNOSIS — N18.4 ANEMIA DUE TO STAGE 4 CHRONIC KIDNEY DISEASE: ICD-10-CM

## 2018-05-10 DIAGNOSIS — R94.39 ABNORMAL CARDIOVASCULAR STRESS TEST: ICD-10-CM

## 2018-05-10 DIAGNOSIS — E78.00 PURE HYPERCHOLESTEROLEMIA: ICD-10-CM

## 2018-05-10 DIAGNOSIS — I10 BENIGN ESSENTIAL HTN: ICD-10-CM

## 2018-05-10 DIAGNOSIS — D63.1 ANEMIA DUE TO STAGE 4 CHRONIC KIDNEY DISEASE: ICD-10-CM

## 2018-05-10 DIAGNOSIS — I25.119 CORONARY ARTERY DISEASE INVOLVING NATIVE CORONARY ARTERY OF NATIVE HEART WITH ANGINA PECTORIS: Primary | ICD-10-CM

## 2018-05-10 DIAGNOSIS — I51.9 LV DYSFUNCTION: ICD-10-CM

## 2018-05-10 PROCEDURE — 99999 PR PBB SHADOW E&M-EST. PATIENT-LVL III: CPT | Mod: PBBFAC,,, | Performed by: INTERNAL MEDICINE

## 2018-05-10 PROCEDURE — 99213 OFFICE O/P EST LOW 20 MIN: CPT | Mod: PBBFAC,PO | Performed by: INTERNAL MEDICINE

## 2018-05-10 PROCEDURE — 99215 OFFICE O/P EST HI 40 MIN: CPT | Mod: S$PBB,,, | Performed by: INTERNAL MEDICINE

## 2018-05-10 RX ORDER — TERAZOSIN 1 MG/1
CAPSULE ORAL
COMMUNITY
Start: 2018-05-07 | End: 2018-05-10

## 2018-05-10 NOTE — PROGRESS NOTES
Subjective:    Patient ID:  Micheal Hunt is a 77 y.o. male who presents for  of No chief complaint on file.  For post CABG, 3 months review, post biopsies of kidney and stomach, elevated HTN, progressive CKD now stage 4  PCP: Dr. Lakhani  Renal: Dr. Rojo, last seen 1/10/2018  ENT: Dr. Nails  GI: Dr. Harris, Dr. Saleem  Physical medicine: Dr. Whitehead  Urology: Dr. Herring  Orthopedic: Dr. Fox in [place], [place], [phone], Dr. Álvarez  Lives alone, daughter, Tonya, here with patient, on the same ranch, 20 acre, 4 horses, 20 chicken, no  workers, prior commercial contractor, grandson, Uziel  Daughter, Crow, nutritionist in [facility] supportive of Mediterranean diet, now on plant-based diet, and a Yoga instruction.      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 Significant cardiac history with previous CABG in [place]. Stress test earlier this year was abnormal: Lexiscan -  Nuclear Quantitative Functional Analysis:                                    LVEF: 47 % (normal is 47 - 59)                                               LVED Volume: 195 ml (normal is 91 - 155)                                     LVES Volume: 104 ml (normal is 40 - 78)                                                                                                                   Impression: ABNORMAL MYOCARDIAL PERFUSION                                    1. There is evidence for mild to moderate myocardial ischemia in the         septal wall of the left ventricle, and moderate myocardial ischemia in       the anteroapical wall of the left ventricle with associated stress           induced LV cavity dilatation.                                                2. There is mild intensity fixed defect in the lateral wall of the left      ventricle, consistent with myocardial injury.                                3. There is abnormal wall motion at rest showing severe hypokinesis of        the septal wall of the left ventricle.                                       4. Resting LV function is normal.  (normal is 47 - 59)                       5. The left ventricular volume is moderately increased at rest.              6. The extracardiac distribution of radioactivity is normal.    Subsequent angiogram, 5/2012:  ANGIOGRAPHIC RESULTS:                                                                                                                                  DIAGNOSTIC:                                                                  Patient has a right dominant coronary artery.                                                                                                             - Left Main Coronary Artery:                                                 The LM is occluded. There is JOURDAN 0 flow.                                                                                                                 - Left Anterior Descending Artery:                                           The LAD has luminal irregularities. There is JOURDAN 3 flow. Fed                from the LIMA graft                                                                                                                                       - Left Circumflex Artery:                                                    The LCX was not found.                                                                                                                                    - Right Coronary Artery:                                                     The RCA has luminal irregularities. There is JOURDAN 3 flow.                    appear to be fed from SVG, could not cannulate, poorly visualized.                                                                                        - Aortic Root:                                                               The Aortic Root is normal. There is JOURDAN 3 flow.                             Lesion  Details: Moderate proximal tortuosity, mild to                        moderate calcification.                                                                                                                                   - HERRON To LAD:                                                               The LIMA to LAD is normal. There is JOURDAN 3 flow.                                                                                                                                                                                       D. SUMMARY:                                                                                                                                               1. Three vessel coronary artery disease.                                     2. Normal LVEF.                                                              3. Mild aortic insufficiency.                                                4. Very difficult study                                                      5. Multiple attempts to cannulate SVG which appears to go to the RCA.        Native RCA appears to have an ostial occlusion.    Was continued on optimal medical management. Could not tolerate atenolol due to severe weakness. Recently without any cardiac complaints. Limited by left knee with soreness and pains. Went to [facility] in [place], [place] and told of the prior implant was too small and not stable. Anticipating re-op by Dr. Fox, a [place] graduate, in [place]. [place].    Since visit of 10/2/2012, underwent left TKR with good results, had 16 weeks of rehab without much problem. Here today due to hypertension. Home record showed -185/83-99. Noted more dizziness when the pressures are high. Problem is helped by Meclizine. Have decreased exercise following the operation. Diet said to be no salt. No problem with medications,  need 90 days supply.    Since visit of 4/4, concern about his kidney, see telephone enc on 4/22 with BUN of  28, Scr 1.4, K+ 4.1, and eGFR 50. Old record reviewed, no significant julian in function since 6/2011. Agree now to try HCTZ 12.5 mg every other day along with increase in lisinopril to 80 mg daily. Home BP reviewed 159-189/85-99. No other new problem. Wants comprehensive blood work the next visit.    Since visit of 12/13, left sided CP is gone, appears to be due to straining while carrying a 34 lbs grandson. Discussed cath report and send to [place]. Home /80. Daughter feels he is stressed, personally do not feel so. Would like to be back in [place] but not family are here. Ill younger sister in [place]. Biking 3 times a week for 15 to 30 minutes twice a day. Do little weights every day.  Lexiscan, 12/19/2013  Nuclear Quantitative Functional Analysis:   LVEF: 42 % (normal is 47 - 59)  LVED Volume: 193 ml (normal is 91 - 155)  LVES Volume: 112 ml (normal is 40 - 78)    Impression: ABNORMAL MYOCARDIAL PERFUSION  1. There is evidence for mild myocardial ischemia in the anterior and lateral apical walls of the left ventricle with associated stress induced LV cavity dilatation.   2. The perfusion scan is free of evidence for myocardial injury.   3. There is abnormal wall motion at rest showing moderate global hypokinesis of the left ventricle.   4. There is resting LV dysfunction with a reduced ejection fraction of 42 %.  (normal is 47 - 59)  5. The left ventricular volume is moderately increased at rest.   6. The extracardiac distribution of radioactivity is normal.   7. When compared to the previous study from 04/12/2012, the LVEF have decreased with global hypokinesia.  8. [facility] SSS =2 =SDS, suggest that 2.5% of myocardium may be ischemic.    ECHO, 12/2013, CONCLUSIONS     1 - Biatrial enlargement.     2 - Enlarged left ventricular enlargement.     3 - Eccentric hypertrophy.     4 - Low normal to mildly depressed left ventricular systolic function (EF 50-55%).     5 - Left ventricular diastolic  dysfunction.     6 - Mild mitral regurgitation.     7 - Normal right ventricular systolic function .     8 - Mild to moderate aortic regurgitation.     9 - Some improvement in LV function with reduction in AR from echo on 9/29/2012.  Since visit of 5/2, had to go to [place] for sister, 60 year old with a CVA. Stopped HCTZ for 5 weeks due to traveling. No CP but some return of indigestion, previously helped by Prilosec. Used for about 4 weeks with benefit. Discuss Rx 4-8 weeks treatments are reasonable. Blood work on 6/3 LDL is 113, , with HDL of 33. Cr. Remains stable at 1.4 with K+ 4.1. PSA is elevated to > 4 on finasteride 5 mg for past 10 years, prescribed by Urologist in [place].  CBC is normal.    Since visit of 6/11, did not get the referral to Urology, also had recent fever and chills with lower abdominal pain while in [place] last Thursday to Friday, noted some weakened stream. Also at night can hear strong heart beat and take BP showing , would then take an additional 40 mg of lisinopril on top of 80 mg each AM. Blood work showed first time elevation of Scr to 1.6 without change in BUN and normal K+.     Since visit of 7/11, had problem with spironolactone, took only 2 doses and had severe dizziness for 2 days, also had to adjust timing of medications to avoid dizziness after medications. 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 Sleeping well. Other medications are fine, compliant. Blood work showed slight rise in Scr to 1.5-1.6 from 1.4 after spironolactone. Saw Dr. Herring and given antibiotic. BP at home 140-160/80.    Since visit of 8/15, feeling fine, no problem with medications, home BP similar with systolic of 140-150. 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 No problem note, no limits. Using Tylenol 500 mg BID for OA, helpful. Sleeping well, tried Melatonin and now just using warm milk at HS. Blood  "work reviewed, normal testosterone, uric acid 6.5, BMP with stable Cr of 1.5 with FBS of 103, Lipid panel shows LDL of 78.8 on 80 mg of atorvastatin.     Since visit of 9/17, saw Dr. Calderon for pre-op evaluation and suggested sooner follow up for abnormal stress test. Denies any CP but with occasional chest "congestion" with pleuritic CP with deep breathing, Occurs now and then, sometime related to heavy exertion. Helped with breathing Rx in hospital and albuterol inhaler at home. Last spell about a month ago, lasted for 30 minutes. Had OP left operation on 11/25, no problem. Since home no problem. CXR on 11/6 was normal. Recent labs - LDL 86, HDL 36, normal CMP and CBC.  Serum Cr 1.4, eGFR 49.1, stable. Request nebulizer for home use.    Since visit of 12/10, next morning woke up with high BP, 184/102, felt dizzy, no fall, then a constant heart "hurting", grade 5/10, seems to increase after meal, tried Rolaids and Tums without much help. Pain constant til now. Call answering service at 6 AM and advised to come to office for EKG and evaluation. EKG NSR with frequent APC, rate of 68, LVH with STT abnormalities, no acute change. Discussed atypical presentation of heart pains and also possible GI source of problem. Have not seen any GI specialist for his GERD. Also discussed use of NTG for chest pains. BP at home this AM, 174/100, obviously distressed.    Since visit of 1/9/2014, continue with some back pain over past 6 months, concern possibly due to high dose atorvastatin. Also noted some fatigue with palpitation in the middle of the night, have to get up 3 times for nocturia. No CP but BP elevated in the middle of night to 140/92. Last Holter in 4/2012: PREDOMINANT RHYTHM  1. Sinus rhythm with heart rates varying between 40 and 195 bpm with an average of 77 bpm.     VENTRICULAR ARRHYTHMIAS  1. There were occasional PVCs totalling 290 and averaging 12 per hour.  There were 2 couplets.    2. There were no episodes of " ventricular tachycardia.    SUPRA VENTRICULAR ARRHYTHMIAS  1. There were very frequent PACs totalling 5254 and averaging 228 per hour.  There were 143 couplets.    2. There were no episodes of sustained supraventricular tachycardia.    SINUS NODE FUNCTION  1. There was no evidence of high grade SA nicolás block.     AV CONDUCTION  1. There was no evidence of high grade AV block.     DIARY  1. The diary was not returned    MISCELLANEOUS  1. This was a tape of adequate length (23 hrs).    Also worries about friend in [place] dying without a definite cause. Some worries of kidney and liver due to medications. Last labs in 11/2013 reviewed.     Since visit of 3/17, no new problem, no further CP,   Holter done without symptoms - PREDOMINANT RHYTHM  1. Sinus arrhythmia with heart rates varying between 41 and 164 bpm with an average of 79 bpm.     VENTRICULAR ARRHYTHMIAS  1. There were occasional mostly monomorphic PVCs totalling 272 and averaging 11 per hour.  There was 1 couplet.    2. There were no episodes of ventricular tachycardia.    SUPRA VENTRICULAR ARRHYTHMIAS  1. There were frequent PACs totalling 1748 and averaging 72 per hour.  There were 9 bigeminal cycles.  There were 47 couplets. There were 5 triplets.     2. 1.5% of complexes.    3. There were no episodes of sustained supraventricular tachycardia.    SINUS NODE FUNCTION  1. There was no evidence of high grade SA nicolás block.     AV CONDUCTION  1. There was no evidence of high grade AV block.     2. The longest RR interval was 2110 msec.     DIARY  1. The diary was returned, but not completed    MISCELLANEOUS  1. This was a tape of adequate length (24 hrs).    Labs showed new glucose intolerance, , admit to using lot of sugar recently. CK is elevated with back pain. Last Lipid in 11/2013 LDL-C was 86.4. Sleep evaluation next month.     Since visit of 3/28/2014, stressed due to close sister illnesses with Alzheimer in [place], stayed there for 8 weeks and recent  return with weight gain. No definite muscular pains still with mild chronic low back pain. CP is better, occasional burning.  Home BP reviewed, mostly > 150/80, have occasional HA, every other week. Using HCTZ 25 mg , half tab every other day. Not yet to sleep evaluation. Discussed potential cause of resistant HTN due to untreated GRICELDA.  Labs reviewed CPK remains elevated 362 to 421, FBS improved from 127 to 110. Renal stable with Cr 1.6 to 1.5, K+ 4.1. Lipid LDL-C 69.8. Discussed optional of trying 10 mg of atorvastatin or continuing on 40 mg and be aware of muscle pains / weakness and to monitor CPK.    Since visit of 6/26, no new problem, labs showed slow progressive CKD eGFR now 40, Cr 1.7, BUN 25, . Want to see nephrologist.  Had Sleep study on 6/30 - CONCLUSION:  Nocturnal polysomnography demonstrates:  1.  Obstructive sleep apnea to be present with 44.2 events an hour with    desaturation to 81%.  2.  Sinus rhythm with occasional PVC.     PLAN:  He will return for nasal CPAP titration at a later date.    Since visit of 7/25, OK til 3 weeks ago, started to experience ARMAS, any rushing, similar to prior to CABG. Some CP, more like burning, grade 1/10, last until relax. ECG today SA, rate 56. 1st degree AVB, LVH with ST abnormalities. Called for earlier appointment. No problem with the medications. Just started CPAP this past Sunday, 3 days ago. Last lab again reviewed - K+ 4.1. Had prior problem with spironolactone. Home /95.     Since visit of 9/10, the CP and ARMAS have improved. Could not tolerate eplerenone 25 mg due to marked bradycardia, rate to 40 along with tiredness and weakness, not the expected side effects. Home /79, feeling better except for dry cough, nasal congestion, and bad HA, recurrent problem over the years. Best Rx with short course of Augmentin. Given Doxycycline without benefit. Lab today , BMP with Cr stable at 1.6, K+ 4.0. Also wants review with GI on GERD, and not  able to lose weight.    Since visit of 9/24/2014, no new problem, no problem with medications. GRICELDA reviewed, troubled by being awaken hourly during testing. Denies any fatigue, lots of energy. Labs - stable CKD eGFR 42, , K+ 4.1. Last urine 6/2013. Home BP good at 140/80-85. 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     Since visit of 12/5/2014, had problem with diverticulitis last month now corrected diet, no nuts, more fiber. Denies any CP nor SOB. No sleepiness. Weight up and down. Recent labs A1C 6.2%, LDL-C 59, Hgb 13.3, CMP showed eGFR 39 with Cr 1.7, K+ 3.8.    Since visit of 3/19/2015, no new problem. Had review with Dr. Álvarez, X-ray negative and completed 6 weeks of PT without much benefit. Being closely followed by Dr. Hodgson, recent labs shows eGFR 39, Cr 1.7, have proteinuria, , mild anemia, Hgb 13.4, iron profile is sufficient. Lipid excellent, LDL 58, non-HDL 89.    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 Today discomfort started after 3 zandra, had to stop for 20 minutes, did not use NTG. Similar problem over the last 8 months. Compliant with medications but home BP are high, 130-188/, HR 65-85. ECG shows NSR PAC, LVH, pulmonary pattern. Last lipid in 8/2015 LDL 58.2, non-HDL 90. Active biking twice a week for 30 minutes then farm walk daily.    Since visit of 2/18, continue to have occasional chest burning when rushing fast across pasture or lifting heavy bags. Has about 5 minutes of discomfort, grade 1/10. Also noted some nighttime dry cough, don't believe due to Lisinopril, like nasal congestion with PND. Daughter have Zyrtec, will try. Discussed options for Rx of Abnormal stress test, had difficult LHC in 2012 along with stage 3 CKD. Will proceed with optimize medical Rx first.   ECHO, 3/2016 CONCLUSIONS     1 - Biatrial enlargement.     2 - Enlarged left ventricular enlargement.     3 -  Eccentric hypertrophy.     4 - Low normal to mildly depressed left ventricular systolic function (EF 50-55%).     5 - Mild to moderate aortic regurgitation.     6 - Mild mitral regurgitation.     7 - Left ventricular diastolic dysfunction. E/e'(lat) is 10.  This along with the following abnormalities (ATUL = 49.13 and LVMI = 181.70) suggests significant diastolic dysfunction.     8 - Right ventricular enlargement with mildly depressed systolic function.     9 - The estimated PA systolic pressure is 28 mmHg.     10 - No significant change from Echo on 12/19/2013.     Lexiscan - Nuclear Quantitative Functional Analysis:   LVEF: 34 % (normal is 47 - 59)  LVED Volume: 192 ml (normal is 91 - 155)  LVES Volume: 126 ml (normal is 40 - 78)    Impression: ABNORMAL MYOCARDIAL PERFUSION  1. There is evidence for moderate myocardial ischemia in the inferolateral wall of the left ventricle with associated stress induced LV cavity dilatation with underlying injury present.   2. There is abnormal wall motion at rest showing moderate global hypokinesis of the left ventricle. severe hypokinesis of the inferolateral wall of the left ventricle.   3. There is resting LV dysfunction with a reduced ejection fraction of 34 %.  (normal is 47 - 59)  4. The left ventricular volume is mildly increased at rest.   5. The extracardiac distribution of radioactivity is normal.   6. When compared to the previous study from 12/19/2013, current study indicate inferolateral defects.  7. [employer] SSS=10, SRS=5, and SDS=5, suggest that 7% of myocardium may be ischemic.    Since visit of 3/14, feeling better, wanted no change with medications, long discussion on use of Coreg and need for titration. Also labs reviewed, mild anemia due to CKD, stage 3, eGFR 33, decreased from 40, Dr. Hodgson recommend better cholesterol control. , and non- on 80 mg of Atorvastatin. Want better diet.    Since visit of 4/25 had problem on 5/2/2016 with  "sudden onset of vertigo and right ear pain. Had review with Dr. Vasquez and medications changes recommended see telephone encounter note. Now review medications again and vertigo improved after taking Meclizine 4-25 mg tabs daily. Home BP log 127-170/, HR 63-82. Currently back on Coreg 3.125 mg bid. Discuss hope to proceed with Coreg titration with the goal of 25 mg bid.    In 6/2016, had tangled with the trees with review by Dr. Vasquez, right ribs bruised, no fracture, also vertigo with quick turn, fell. Still with some exertional chest burning, average twice a week, but very brief, just seconds. No problem with medications. Had review with Dr. Nails.    In 7/2016, multiple complains, generalized itching started about a week ago, stopped Coreg but itching still there. Also problem with recurrent vertigo exacerbated by head turning or bending, no fall but bothersome, able to do all farm chores. In addition noted to be more tired with the higher dose of Coreg. No SOB nor CP. Recent labs show NICK with Cr 2.2 to 2.5, eGFR down to 24. Will be seeing nephrologist 8/2/2016.    In 12/2016, problem with acute head congestion, frontal, seasonal, usually helped with Augmentin for 10 days. Requesting Rx, option discussed. Feeling "good" in general, able to do work without problem. Ran out of Zetia over a month ago. Lipid test LDL 84.4 on 40 mg of atorvastatin. Home /75-80. Dr. Vasquez had changed ACE-I to Valsartan due to cough.    In 3/2017, find a little tiredness and daytime sleepiness over the last 2 weeks, change to daylight saving made a little worst. 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 Sleep well, 6-8 hours, feel good on awakening. Home /80. Compliant with medications. Some concern of more frequent BM, 4-5 times daily. Eating more fruit. Lipid LDL 62.    In 6/2017, note lot of urine after kidney biopsy about 4 weeks ago. Biopsy reported as OK. Had stomach biopsy about a week ago, " "since then been feeling "yukki", was informed to expect it. Home BP is similar to office reading, Remain active on farm, lot of manual work no problem. No more CP nor ARMAS. ECG today suggest WAP, 64, left axis, LVH, PRWP and high lateral T-wave inversion no change from ECG in 2015. No problem with medications. First cousin age 44 yo,  of massive MI, no symptoms.    In 2017, noted bad itching in both legs for about 2 months, not controlled with topical Rx including steroid. Off Valsartan for 3 days without any changed but BP elevated to 150/90, restarted 4 days ago. Half life of 6 hours: therefore adequate off trial. Also have problem with insomnia, Ambien was helpful before. Home BP this am 140/73. No other problem. Will restart Valsartan first, wants to use up Lisinopril supply, advised to first use up current Valsartan then can retry the Lisinopril, understand side effect of dry cough. Vertigo improved post sinus balloon procedure.  Echo 2017  CONCLUSIONS     1 - Mildly enlarged aortic root.     2 - Biatrial enlargement.     3 - Concentric hypertrophy.     4 - No wall motion abnormalities.     5 - Normal left ventricular systolic function (EF 55-60%).     6 - Mild aortic regurgitation.     7 - Mild mitral regurgitation.     8 - Indeterminate LV diastolic function.     9 - Normal right ventricular systolic function .     10 - The estimated PA systolic pressure is greater than 25 mmHg.     11 - Suggest some improvement in LVEF from Echo in 3/2016.     In 2018, report congestion, sinusitis, bronchitis since , used all kind of medications. Renal review on 1/10 showed significant progression of disease, Cr up to 4.3 from 3.3 just 3 months ago. eGFR down to 12. Home BP log reviewed 137 to 176 / 77 to 90, HR 65 to 79. Been taking an addition half of Valsaran 320 mg when SBP > 175 or BBP > 85, recall doing it for 3 times. No cardiac complaints, no CP nor SOB, been able to do all the farm work " "without problem. ECG in 12/2017 - sinus bradycardia, rate 536 left axis, LVH with STT abnormalties.    HPI Comments: in 5/2018, here for review, complaints of chronic fatigue but still able to take care of the farm without much problem. Lost 28 lbs with plant-based diet. Compliant with medications. Home /80. LDL in 4/2018 47.6. Dizziness improved over the past 2 days with time adjustment on taking the medications.    Review of Systems      Constitutional: no further chills, fevers, and sweats and no real fatigue, less head congestion uses mask outdoor, weight loss 28 lbs since 1/2018  Eyes: negative for icterus, redness and visual disturbance  Respiratory: negative for asthma, minimal dry cough with seasonal sinusitis, no dyspnea on exertion, no hemoptysis, pleurisy/chest pain and wheezing, [place] score 0 now 5  Cardiovascular: no further chest pain, chest pressure/discomfort, dyspnea, near-syncope, no further nightly palpitations with less occasional /90, no paroxysmal nocturnal dyspnea and syncope  Gastrointestinal: negative for abdominal pain, nausea and vomiting, no further heart burn on exertion, BM more frequent. Recent black stool post biopsy.  Musculoskeletal: No muscle weakness and myalgias, positive for arthralgias, left knee not as good, benefited from operation, have muscle cramp in the left leg post op  Neurological: negative for coordination problems, no further dizziness after medications, takes in interval, no gait problems, less headaches, likely sinus, no paresthesia, tremors and vertigo, sleeping 8 hours nightly.  Psych: no depression nor anxious, close sister (71 year old) passed in [place] 1/2016.    Objective:    Physical Exam     Constitutional: He appears healthy. No distress.   HENT:   Mouth/Throat: Dentition is normal.   Eyes: Conjunctivae are normal.   Neck: Thyroid normal. Neck supple. Circumference 15.5"  Cardiovascular: Normal rate, regular rhythm and normal pulses. PMI " "is not displaced. Exam reveals no gallop.   Medium-pitched machinery crescendo-decrescendo early systolic murmur is present with a grade of 3/6 at the upper right sternal border, upper left sternal border and apex   Pulses:   Carotid pulses are 2+ on the right side, and 2+ on the left side.   Dorsalis pedis pulses are 2+ on the right side, and 2+ on the left side.   Pulmonary/Chest: Breath sounds normal. He exhibits no tenderness.   Abdominal: Soft, very active bowel sounds. Waist 46" down to 43.25"  Extremities: no pitting edema around the sock line.   Neurological: He is alert and oriented to person, place, and time. Gait normal.   Skin: Skin is warm and dry. No cyanosis. No pallor. Nails show no clubbing.     Assessment:       1. Coronary artery disease involving native coronary artery of native heart with angina pectoris    2. Benign essential HTN    3. Pure hypercholesterolemia    4. Abnormal cardiovascular stress test    5. LV dysfunction, EF 50%, reduced to 34%    6. Anemia due to stage 4 chronic kidney disease    7. BMI 29.0-29.9,adult         Plan:   Micheal was seen today for follow-up.    Diagnoses and associated orders for this visit:    Coronary artery disease involving native coronary artery of native heart with angina pectoris  -     Transthoracic echo (TTE) complete; Future  -     Stress test with myocardial perfusion; Future    Benign essential HTN  -     Transthoracic echo (TTE) complete; Future    Pure hypercholesterolemia  -     Stress test with myocardial perfusion; Future    Abnormal cardiovascular stress test  -     Transthoracic echo (TTE) complete; Future  -     Stress test with myocardial perfusion; Future    LV dysfunction, EF 50%, reduced to 34%  -     Transthoracic echo (TTE) complete; Future  -     Stress test with myocardial perfusion; Future    Anemia due to stage 4 chronic kidney disease  -     Transthoracic echo (TTE) complete; Future    BMI 29.0-29.9,adult    - All medical issue " review, continue current Rx.  - CV status stable, continue current Rx, all medications reviewed, patient acknowledge good understanding.  - Need close follow up with Dr. Rojo in regard to BP Rx   Instruction for Mediterranean diet and heart healthy exercise given.  - Weigh twice weekly, try to lose 1-2 lbs per week  - Belly exercise daily  - Follow up in 3 months per patient's preference  - Phone review / encourage use of [facility]    Chronic fatigue - improved    Benign non-nodular prostatic hyperplasia without lower urinary tract symptoms  -     Change terazosin (HYTRIN) 10 MG capsule; Take 2 capsules (20 mg total) by mouth every evening.  Dispense: 180 capsule; Refill: 3    - Check home blood pressure, 2 days weekly, do 2 readings within 5 minutes in AM and PM, keep log for review.    Proteinuria    Diastolic dysfunction    ARMAS (dyspnea on exertion) - improved    Abdominal obesity    OA, post left TKR      Patient Active Problem List   Diagnosis    Osteoarthritis of right knee, L-TKR 10/2012    Nocturia    Hematuria    BPH (benign prostatic hyperplasia)    Carotid stenosis    Benign essential HTN    Hyperlipidemia, baseline     CAD (coronary artery disease), 2V CABG 2007    Gout, arthritis    Vertigo    LVH (left ventricular hypertrophy) due to hypertensive disease    Abnormal cardiovascular stress test    GERD (gastroesophageal reflux disease)    Elevated PSA    Acquired hammer toe    Diastolic dysfunction    Abdominal obesity    Back pain, chronic    Glucose intolerance (impaired glucose tolerance)    Anemia, mild    Gastric nodule    Diverticulitis, 2005, 2015    Personal history of colonic polyps    PSA elevation    DDD (degenerative disc disease), lumbar    LV dysfunction, EF 50%, reduced to 34%    Other spondylosis, lumbar region    Knee pain    BPH with obstruction/lower urinary tract symptoms    Itching, both legs, onset 7/2017    Chronic sinusitis    Mild  persistent asthma without complication    Sigmoid diverticulitis    Anemia due to stage 4 chronic kidney disease    Thrombocytopenia    Renal cyst    Unilateral inguinal hernia    History of colon polyps    BMI 29.0-29.9,adult     Total face-to-face time with the patient was 30 minutes and greater than 50% was spent in counseling and coordination of care. The above assessment and plan have been discussed at length. Labs and procedure reviewed. Problem List updated. Asked to bring in all active medications / pills bottles with next visit.

## 2018-05-18 ENCOUNTER — LAB VISIT (OUTPATIENT)
Dept: LAB | Facility: HOSPITAL | Age: 78
End: 2018-05-18
Attending: INTERNAL MEDICINE
Payer: MEDICARE

## 2018-05-18 DIAGNOSIS — I11.0 HYPERTENSIVE HEART DISEASE WITH CONGESTIVE HEART FAILURE: ICD-10-CM

## 2018-05-18 DIAGNOSIS — N40.0 BENIGN ENLARGEMENT OF PROSTATE: ICD-10-CM

## 2018-05-18 DIAGNOSIS — N18.4 CHRONIC KIDNEY DISEASE, STAGE IV (SEVERE): ICD-10-CM

## 2018-05-18 DIAGNOSIS — R53.83 FATIGUE: ICD-10-CM

## 2018-05-18 DIAGNOSIS — M19.90 LOCALIZED OSTEOARTHROSIS UNCERTAIN IF PRIMARY OR SECONDARY: ICD-10-CM

## 2018-05-18 DIAGNOSIS — I25.810 CORONARY ATHEROSCLEROSIS OF AUTOLOGOUS VEIN BYPASS GRAFT: Primary | ICD-10-CM

## 2018-05-18 DIAGNOSIS — M10.9 GOUT, UNSPECIFIED: ICD-10-CM

## 2018-05-18 DIAGNOSIS — N18.30 CHRONIC KIDNEY DISEASE, STAGE III (MODERATE): ICD-10-CM

## 2018-05-18 LAB
ALBUMIN SERPL BCP-MCNC: 4 G/DL
ALBUMIN SERPL BCP-MCNC: 4 G/DL
ALP SERPL-CCNC: 73 U/L
ALT SERPL W/O P-5'-P-CCNC: 16 U/L
ANION GAP SERPL CALC-SCNC: 11 MMOL/L
ANION GAP SERPL CALC-SCNC: 11 MMOL/L
AST SERPL-CCNC: 16 U/L
BASOPHILS # BLD AUTO: 0 K/UL
BASOPHILS NFR BLD: 0.4 %
BILIRUB SERPL-MCNC: 0.4 MG/DL
BUN SERPL-MCNC: 37 MG/DL
BUN SERPL-MCNC: 37 MG/DL
CALCIUM SERPL-MCNC: 10.5 MG/DL
CALCIUM SERPL-MCNC: 10.5 MG/DL
CHLORIDE SERPL-SCNC: 107 MMOL/L
CHLORIDE SERPL-SCNC: 107 MMOL/L
CO2 SERPL-SCNC: 22 MMOL/L
CO2 SERPL-SCNC: 22 MMOL/L
CREAT SERPL-MCNC: 4.1 MG/DL
CREAT SERPL-MCNC: 4.1 MG/DL
DIFFERENTIAL METHOD: ABNORMAL
EOSINOPHIL # BLD AUTO: 0.3 K/UL
EOSINOPHIL NFR BLD: 4.7 %
ERYTHROCYTE [DISTWIDTH] IN BLOOD BY AUTOMATED COUNT: 14.3 %
EST. GFR  (AFRICAN AMERICAN): 15 ML/MIN/1.73 M^2
EST. GFR  (AFRICAN AMERICAN): 15 ML/MIN/1.73 M^2
EST. GFR  (NON AFRICAN AMERICAN): 13 ML/MIN/1.73 M^2
EST. GFR  (NON AFRICAN AMERICAN): 13 ML/MIN/1.73 M^2
FERRITIN SERPL-MCNC: 103 NG/ML
GLUCOSE SERPL-MCNC: 100 MG/DL
GLUCOSE SERPL-MCNC: 100 MG/DL
HCT VFR BLD AUTO: 29.3 %
HGB BLD-MCNC: 10.1 G/DL
IRON SERPL-MCNC: 52 UG/DL
LYMPHOCYTES # BLD AUTO: 1.8 K/UL
LYMPHOCYTES NFR BLD: 24.7 %
MCH RBC QN AUTO: 31.3 PG
MCHC RBC AUTO-ENTMCNC: 34.4 G/DL
MCV RBC AUTO: 91 FL
MONOCYTES # BLD AUTO: 0.3 K/UL
MONOCYTES NFR BLD: 4.9 %
NEUTROPHILS # BLD AUTO: 4.7 K/UL
NEUTROPHILS NFR BLD: 65.3 %
PHOSPHATE SERPL-MCNC: 4.8 MG/DL
PHOSPHATE SERPL-MCNC: 4.8 MG/DL
PLATELET # BLD AUTO: 191 K/UL
PMV BLD AUTO: 7.3 FL
POTASSIUM SERPL-SCNC: 4 MMOL/L
POTASSIUM SERPL-SCNC: 4 MMOL/L
PROT SERPL-MCNC: 7.2 G/DL
RBC # BLD AUTO: 3.22 M/UL
SATURATED IRON: 15 %
SODIUM SERPL-SCNC: 140 MMOL/L
SODIUM SERPL-SCNC: 140 MMOL/L
TOTAL IRON BINDING CAPACITY: 340 UG/DL
TRANSFERRIN SERPL-MCNC: 230 MG/DL
WBC # BLD AUTO: 7.1 K/UL

## 2018-05-18 PROCEDURE — 36415 COLL VENOUS BLD VENIPUNCTURE: CPT

## 2018-05-18 PROCEDURE — 80053 COMPREHEN METABOLIC PANEL: CPT

## 2018-05-18 PROCEDURE — 83540 ASSAY OF IRON: CPT

## 2018-05-18 PROCEDURE — 84100 ASSAY OF PHOSPHORUS: CPT

## 2018-05-18 PROCEDURE — 85025 COMPLETE CBC W/AUTO DIFF WBC: CPT

## 2018-05-18 PROCEDURE — 82728 ASSAY OF FERRITIN: CPT

## 2018-05-19 DIAGNOSIS — R94.39 ABNORMAL CARDIOVASCULAR STRESS TEST: ICD-10-CM

## 2018-05-19 DIAGNOSIS — I20.89 ANGINA OF EFFORT: ICD-10-CM

## 2018-05-20 RX ORDER — NITROGLYCERIN 0.4 MG/1
0.4 TABLET SUBLINGUAL EVERY 5 MIN PRN
Qty: 25 TABLET | Refills: 3 | Status: SHIPPED | OUTPATIENT
Start: 2018-05-20 | End: 2019-10-11 | Stop reason: ALTCHOICE

## 2018-05-21 ENCOUNTER — PATIENT MESSAGE (OUTPATIENT)
Dept: CARDIOLOGY | Facility: CLINIC | Age: 78
End: 2018-05-21

## 2018-05-22 DIAGNOSIS — I10 ESSENTIAL HYPERTENSION: ICD-10-CM

## 2018-05-22 DIAGNOSIS — I51.9 LV DYSFUNCTION: ICD-10-CM

## 2018-05-22 RX ORDER — CARVEDILOL 6.25 MG/1
6.25 TABLET ORAL 2 TIMES DAILY WITH MEALS
Qty: 90 TABLET | Refills: 3 | Status: SHIPPED | OUTPATIENT
Start: 2018-05-22 | End: 2018-08-29 | Stop reason: ALTCHOICE

## 2018-06-06 ENCOUNTER — HOSPITAL ENCOUNTER (OUTPATIENT)
Dept: CARDIOLOGY | Facility: HOSPITAL | Age: 78
Discharge: HOME | End: 2018-06-06
Attending: INTERNAL MEDICINE
Payer: MEDICARE

## 2018-06-06 ENCOUNTER — HOSPITAL ENCOUNTER (OUTPATIENT)
Dept: RADIOLOGY | Facility: HOSPITAL | Age: 78
Discharge: HOME | End: 2018-06-06
Attending: INTERNAL MEDICINE
Payer: MEDICARE

## 2018-06-06 VITALS
RESPIRATION RATE: 16 BRPM | SYSTOLIC BLOOD PRESSURE: 129 MMHG | SYSTOLIC BLOOD PRESSURE: 140 MMHG | DIASTOLIC BLOOD PRESSURE: 102 MMHG | HEART RATE: 63 BPM | BODY MASS INDEX: 29.16 KG/M2 | DIASTOLIC BLOOD PRESSURE: 66 MMHG | HEART RATE: 60 BPM | HEIGHT: 73 IN | WEIGHT: 220 LBS

## 2018-06-06 DIAGNOSIS — E78.00 PURE HYPERCHOLESTEROLEMIA: ICD-10-CM

## 2018-06-06 DIAGNOSIS — I25.119 CORONARY ARTERY DISEASE INVOLVING NATIVE CORONARY ARTERY OF NATIVE HEART WITH ANGINA PECTORIS: ICD-10-CM

## 2018-06-06 DIAGNOSIS — I51.9 LV DYSFUNCTION: ICD-10-CM

## 2018-06-06 DIAGNOSIS — N18.4 ANEMIA DUE TO STAGE 4 CHRONIC KIDNEY DISEASE: ICD-10-CM

## 2018-06-06 DIAGNOSIS — R94.39 ABNORMAL CARDIOVASCULAR STRESS TEST: ICD-10-CM

## 2018-06-06 DIAGNOSIS — D63.1 ANEMIA DUE TO STAGE 4 CHRONIC KIDNEY DISEASE: ICD-10-CM

## 2018-06-06 DIAGNOSIS — I10 BENIGN ESSENTIAL HTN: ICD-10-CM

## 2018-06-06 LAB
AORTIC ROOT ANNULUS: 3.79 CM
AORTIC VALVE CUSP SEPERATION: 0.99 CM
AV MEAN GRADIENT: 10.86 MMHG
AV PEAK GRADIENT: 19.71 MMHG
AV VALVE AREA: 1.05 CM2
BSA FOR ECHO PROCEDURE: 2.27 M2
CV ECHO LV RWT: 0.35 CM
CV STRESS BASE HR: 56
CVPEAKDIABP: 102
CVPEAKSYSBP: 140
CVRESTDIABP: 102
CVRESTSYSBP: 140
DOP CALC AO PEAK VEL: 2.22 M/S
DOP CALC AO VTI: 51.49 CM
DOP CALC LVOT AREA: 3.46 CM2
DOP CALC LVOT DIAMETER: 2.1 CM
DOP CALC LVOT STROKE VOLUME: 53.97 CM3
DOP CALCLVOT PEAK VEL VTI: 15.59 CM
E WAVE DECELERATION TIME: 302 MSEC
E/A RATIO: 1.73
E/E' RATIO: 11.69
ECHO LV POSTERIOR WALL: 1.15 CM (ref 0.6–1.1)
EJECTION FRACTION- HIGH: 59 %
END DIASTOLIC INDEX-HIGH: 155 ML/M2
END DIASTOLIC INDEX-LOW: 91 ML/M2
END SYSTOLIC INDEX-HIGH: 78 ML/M2
END SYSTOLIC INDEX-LOW: 40 ML/M2
FRACTIONAL SHORTENING: 23 % (ref 28–44)
INTERVENTRICULAR SEPTUM: 1.09 CM (ref 0.6–1.1)
IVRT: 0.07 MSEC
LEFT ATRIUM SIZE: 5.32 CM
LEFT INTERNAL DIMENSION IN SYSTOLE: 4.9 CM (ref 2.1–4)
LEFT VENTRICLE MASS INDEX: 140.2 G/M2
LEFT VENTRICULAR INTERNAL DIMENSION IN DIASTOLE: 6.39 CM (ref 3.5–6)
LEFT VENTRICULAR MASS: 318.27 G
LV LATERAL E/E' RATIO: 10.86
LV SEPTAL E/E' RATIO: 12.67
MV PEAK A VEL: 0.44 M/S
MV PEAK E VEL: 0.76 M/S
MV STENOSIS PRESSURE HALF TIME: 87.58 MS
MV VALVE AREA P 1/2 METHOD: 2.51 CM2
NUC REST DIASTOLIC VOLUME INDEX: 216
NUC REST EJECTION FRACTION: 41
NUC REST SYSTOLIC VOLUME INDEX: 128
NUC STRESS DIASTOLIC VOLUME INDEX: 241
NUC STRESS EJECTION FRACTION: 47 %
NUC STRESS SYSTOLIC VOLUME INDEX: 126
PISA TR MAX VEL: 2.62 M/S
PV PEAK GRADIENT: 2.73 MMHG
PV PEAK VELOCITY: 0.83 CM/S
RA PRESSURE: 3 MMHG
RETIRED EF AND QEF - SEE NOTES: 47
RIGHT VENTRICULAR END-DIASTOLIC DIMENSION: 3.7 CM
STRESS ECHO POST ESTIMATED WORKLOAD: 1 METS
STRESS ECHO POST EXERCISE DUR MIN: 1 MIN
STRESS ECHO POST EXERCISE DUR SEC: 5
TDI LATERAL: 0.07
TDI SEPTAL: 0.06
TDI: 0.07
TR MAX PG: 27.46 MMHG
TRICUSPID ANNULAR PLANE SYSTOLIC EXCURSION: 0.02 CM
TV REST PULMONARY ARTERY PRESSURE: 30.46 MMHG

## 2018-06-06 PROCEDURE — 93306 TTE W/DOPPLER COMPLETE: CPT

## 2018-06-06 PROCEDURE — 93018 CV STRESS TEST I&R ONLY: CPT | Mod: ,,, | Performed by: INTERNAL MEDICINE

## 2018-06-06 PROCEDURE — 63600175 PHARM REV CODE 636 W HCPCS

## 2018-06-06 PROCEDURE — A9502 TC99M TETROFOSMIN: HCPCS

## 2018-06-06 PROCEDURE — 93306 TTE W/DOPPLER COMPLETE: CPT | Mod: 26,,, | Performed by: INTERNAL MEDICINE

## 2018-06-06 PROCEDURE — 93016 CV STRESS TEST SUPVJ ONLY: CPT | Mod: ,,, | Performed by: INTERNAL MEDICINE

## 2018-06-06 PROCEDURE — 93017 CV STRESS TEST TRACING ONLY: CPT

## 2018-06-06 PROCEDURE — 78452 HT MUSCLE IMAGE SPECT MULT: CPT | Mod: 26,,, | Performed by: INTERNAL MEDICINE

## 2018-06-06 RX ORDER — REGADENOSON 0.08 MG/ML
INJECTION, SOLUTION INTRAVENOUS
Status: COMPLETED
Start: 2018-06-06 | End: 2018-06-06

## 2018-06-06 RX ORDER — REGADENOSON 0.08 MG/ML
0.4 INJECTION, SOLUTION INTRAVENOUS ONCE
Status: COMPLETED | OUTPATIENT
Start: 2018-06-06 | End: 2018-06-06

## 2018-06-06 RX ADMIN — REGADENOSON 0.4 MG: 0.08 INJECTION, SOLUTION INTRAVENOUS at 08:06

## 2018-06-10 ENCOUNTER — PATIENT MESSAGE (OUTPATIENT)
Dept: PHYSICAL MEDICINE AND REHAB | Facility: CLINIC | Age: 78
End: 2018-06-10

## 2018-06-12 ENCOUNTER — TELEPHONE (OUTPATIENT)
Dept: PHYSICAL MEDICINE AND REHAB | Facility: CLINIC | Age: 78
End: 2018-06-12

## 2018-06-12 NOTE — TELEPHONE ENCOUNTER
----- Message from Lety Aguilar sent at 6/12/2018 10:54 AM CDT -----    Pt is  Calling to  Reschedule  Nerve  Block   Injection // pt  Stated  He  Has left  Many  Messages  //please call  To  Book injection  Lauren // [phone]

## 2018-06-13 ENCOUNTER — TELEPHONE (OUTPATIENT)
Dept: TRANSPLANT | Facility: CLINIC | Age: 78
End: 2018-06-13

## 2018-06-14 ENCOUNTER — HOSPITAL ENCOUNTER (INPATIENT)
Facility: HOSPITAL | Age: 78
LOS: 2 days | Discharge: HOME | DRG: 871 | End: 2018-06-16
Attending: EMERGENCY MEDICINE | Admitting: HOSPITALIST
Payer: MEDICARE

## 2018-06-14 DIAGNOSIS — R10.13 EPIGASTRIC PAIN: ICD-10-CM

## 2018-06-14 DIAGNOSIS — R06.02 SHORTNESS OF BREATH: ICD-10-CM

## 2018-06-14 DIAGNOSIS — J18.9 PNEUMONIA OF LEFT LOWER LOBE DUE TO INFECTIOUS ORGANISM: Primary | ICD-10-CM

## 2018-06-14 PROBLEM — R10.84 GENERALIZED ABDOMINAL PAIN: Status: ACTIVE | Noted: 2018-06-14

## 2018-06-14 PROBLEM — N18.5 CHRONIC KIDNEY DISEASE, STAGE 5: Status: ACTIVE | Noted: 2018-06-14

## 2018-06-14 LAB
ALBUMIN SERPL BCP-MCNC: 3.9 G/DL
ALP SERPL-CCNC: 64 U/L
ALT SERPL W/O P-5'-P-CCNC: 12 U/L
ANION GAP SERPL CALC-SCNC: 9 MMOL/L
AST SERPL-CCNC: 12 U/L
BACTERIA #/AREA URNS HPF: ABNORMAL /HPF
BASOPHILS # BLD AUTO: 0 K/UL
BASOPHILS NFR BLD: 0.1 %
BILIRUB SERPL-MCNC: 0.6 MG/DL
BILIRUB UR QL STRIP: NEGATIVE
BNP SERPL-MCNC: 523 PG/ML
BUN SERPL-MCNC: 46 MG/DL
CALCIUM SERPL-MCNC: 10.3 MG/DL
CHLORIDE SERPL-SCNC: 101 MMOL/L
CLARITY UR: CLEAR
CO2 SERPL-SCNC: 25 MMOL/L
COLOR UR: YELLOW
CREAT SERPL-MCNC: 5.9 MG/DL
DEPRECATED S PYO AG THROAT QL EIA: NEGATIVE
DIFFERENTIAL METHOD: ABNORMAL
EOSINOPHIL # BLD AUTO: 0.1 K/UL
EOSINOPHIL NFR BLD: 0.4 %
ERYTHROCYTE [DISTWIDTH] IN BLOOD BY AUTOMATED COUNT: 13.9 %
EST. GFR  (AFRICAN AMERICAN): 10 ML/MIN/1.73 M^2
EST. GFR  (NON AFRICAN AMERICAN): 8 ML/MIN/1.73 M^2
GLUCOSE SERPL-MCNC: 94 MG/DL
GLUCOSE UR QL STRIP: NEGATIVE
HCT VFR BLD AUTO: 28.9 %
HGB BLD-MCNC: 9.8 G/DL
HGB UR QL STRIP: ABNORMAL
HYALINE CASTS #/AREA URNS LPF: 3 /LPF
INR PPP: 1
KETONES UR QL STRIP: NEGATIVE
LACTATE SERPL-SCNC: 1 MMOL/L
LEUKOCYTE ESTERASE UR QL STRIP: NEGATIVE
LIPASE SERPL-CCNC: 93 U/L
LYMPHOCYTES # BLD AUTO: 1.7 K/UL
LYMPHOCYTES NFR BLD: 10.9 %
MCH RBC QN AUTO: 31.4 PG
MCHC RBC AUTO-ENTMCNC: 34 G/DL
MCV RBC AUTO: 92 FL
MICROSCOPIC COMMENT: ABNORMAL
MONOCYTES # BLD AUTO: 0.8 K/UL
MONOCYTES NFR BLD: 5.2 %
NEUTROPHILS # BLD AUTO: 13.2 K/UL
NEUTROPHILS NFR BLD: 83.4 %
NITRITE UR QL STRIP: NEGATIVE
PH UR STRIP: 6 [PH] (ref 5–8)
PLATELET # BLD AUTO: 152 K/UL
PMV BLD AUTO: 7.9 FL
POCT GLUCOSE: 135 MG/DL (ref 70–110)
POTASSIUM SERPL-SCNC: 4.3 MMOL/L
PROT SERPL-MCNC: 6.9 G/DL
PROT UR QL STRIP: ABNORMAL
PROTHROMBIN TIME: 10.5 SEC
RBC # BLD AUTO: 3.13 M/UL
RBC #/AREA URNS HPF: 3 /HPF (ref 0–4)
SODIUM SERPL-SCNC: 135 MMOL/L
SP GR UR STRIP: 1.02 (ref 1–1.03)
SQUAMOUS #/AREA URNS HPF: 1 /HPF
TROPONIN I SERPL DL<=0.01 NG/ML-MCNC: 0.04 NG/ML
URN SPEC COLLECT METH UR: ABNORMAL
UROBILINOGEN UR STRIP-ACNC: NEGATIVE EU/DL
WBC # BLD AUTO: 15.8 K/UL
WBC #/AREA URNS HPF: 4 /HPF (ref 0–5)

## 2018-06-14 PROCEDURE — 87081 CULTURE SCREEN ONLY: CPT | Mod: NTX

## 2018-06-14 PROCEDURE — 93010 ELECTROCARDIOGRAM REPORT: CPT | Mod: NTX,,, | Performed by: INTERNAL MEDICINE

## 2018-06-14 PROCEDURE — 85025 COMPLETE CBC W/AUTO DIFF WBC: CPT | Mod: NTX

## 2018-06-14 PROCEDURE — 85610 PROTHROMBIN TIME: CPT | Mod: NTX

## 2018-06-14 PROCEDURE — 84484 ASSAY OF TROPONIN QUANT: CPT | Mod: NTX

## 2018-06-14 PROCEDURE — 12000002 HC ACUTE/MED SURGE SEMI-PRIVATE ROOM: Mod: NTX

## 2018-06-14 PROCEDURE — 93005 ELECTROCARDIOGRAM TRACING: CPT | Mod: NTX

## 2018-06-14 PROCEDURE — 25000003 PHARM REV CODE 250: Mod: NTX | Performed by: HOSPITALIST

## 2018-06-14 PROCEDURE — 87880 STREP A ASSAY W/OPTIC: CPT | Mod: NTX

## 2018-06-14 PROCEDURE — 81000 URINALYSIS NONAUTO W/SCOPE: CPT | Mod: NTX

## 2018-06-14 PROCEDURE — 83605 ASSAY OF LACTIC ACID: CPT | Mod: NTX

## 2018-06-14 PROCEDURE — 83880 ASSAY OF NATRIURETIC PEPTIDE: CPT | Mod: NTX

## 2018-06-14 PROCEDURE — 80053 COMPREHEN METABOLIC PANEL: CPT | Mod: NTX

## 2018-06-14 PROCEDURE — 83690 ASSAY OF LIPASE: CPT | Mod: NTX

## 2018-06-14 PROCEDURE — 63600175 PHARM REV CODE 636 W HCPCS: Mod: NTX | Performed by: HOSPITALIST

## 2018-06-14 PROCEDURE — 99285 EMERGENCY DEPT VISIT HI MDM: CPT | Mod: 25,NTX

## 2018-06-14 PROCEDURE — 87040 BLOOD CULTURE FOR BACTERIA: CPT | Mod: 59,NTX

## 2018-06-14 PROCEDURE — 99900035 HC TECH TIME PER 15 MIN (STAT): Mod: NTX

## 2018-06-14 RX ORDER — TAMSULOSIN HYDROCHLORIDE 0.4 MG/1
0.4 CAPSULE ORAL DAILY
Status: DISCONTINUED | OUTPATIENT
Start: 2018-06-15 | End: 2018-06-16 | Stop reason: HOSPADM

## 2018-06-14 RX ORDER — RAMELTEON 8 MG/1
8 TABLET ORAL NIGHTLY PRN
Status: DISCONTINUED | OUTPATIENT
Start: 2018-06-14 | End: 2018-06-16 | Stop reason: HOSPADM

## 2018-06-14 RX ORDER — GUAIFENESIN/DEXTROMETHORPHAN 100-10MG/5
10 SYRUP ORAL EVERY 4 HOURS PRN
Status: DISCONTINUED | OUTPATIENT
Start: 2018-06-14 | End: 2018-06-16 | Stop reason: HOSPADM

## 2018-06-14 RX ORDER — ONDANSETRON 2 MG/ML
8 INJECTION INTRAMUSCULAR; INTRAVENOUS EVERY 8 HOURS PRN
Status: DISCONTINUED | OUTPATIENT
Start: 2018-06-14 | End: 2018-06-16 | Stop reason: HOSPADM

## 2018-06-14 RX ORDER — FLUTICASONE PROPIONATE 50 MCG
2 SPRAY, SUSPENSION (ML) NASAL DAILY
Status: DISCONTINUED | OUTPATIENT
Start: 2018-06-15 | End: 2018-06-16 | Stop reason: HOSPADM

## 2018-06-14 RX ORDER — IPRATROPIUM BROMIDE AND ALBUTEROL SULFATE 2.5; .5 MG/3ML; MG/3ML
3 SOLUTION RESPIRATORY (INHALATION) EVERY 6 HOURS
Status: DISCONTINUED | OUTPATIENT
Start: 2018-06-14 | End: 2018-06-14 | Stop reason: SDUPTHER

## 2018-06-14 RX ORDER — AMLODIPINE BESYLATE 5 MG/1
10 TABLET ORAL DAILY
Status: DISCONTINUED | OUTPATIENT
Start: 2018-06-15 | End: 2018-06-15 | Stop reason: SDUPTHER

## 2018-06-14 RX ORDER — AMOXICILLIN 250 MG
1 CAPSULE ORAL 2 TIMES DAILY
Status: DISCONTINUED | OUTPATIENT
Start: 2018-06-14 | End: 2018-06-16 | Stop reason: HOSPADM

## 2018-06-14 RX ORDER — FLUTICASONE FUROATE AND VILANTEROL 100; 25 UG/1; UG/1
1 POWDER RESPIRATORY (INHALATION) DAILY
Status: DISCONTINUED | OUTPATIENT
Start: 2018-06-15 | End: 2018-06-14 | Stop reason: SDUPTHER

## 2018-06-14 RX ORDER — LANOLIN ALCOHOL/MO/W.PET/CERES
800 CREAM (GRAM) TOPICAL
Status: DISCONTINUED | OUTPATIENT
Start: 2018-06-14 | End: 2018-06-16 | Stop reason: HOSPADM

## 2018-06-14 RX ORDER — IBUPROFEN 200 MG
24 TABLET ORAL
Status: DISCONTINUED | OUTPATIENT
Start: 2018-06-14 | End: 2018-06-16 | Stop reason: HOSPADM

## 2018-06-14 RX ORDER — ASPIRIN 81 MG/1
81 TABLET ORAL DAILY
Status: DISCONTINUED | OUTPATIENT
Start: 2018-06-15 | End: 2018-06-16 | Stop reason: HOSPADM

## 2018-06-14 RX ORDER — IBUPROFEN 200 MG
16 TABLET ORAL
Status: DISCONTINUED | OUTPATIENT
Start: 2018-06-14 | End: 2018-06-16 | Stop reason: HOSPADM

## 2018-06-14 RX ORDER — GLUCAGON 1 MG
1 KIT INJECTION
Status: DISCONTINUED | OUTPATIENT
Start: 2018-06-14 | End: 2018-06-16 | Stop reason: HOSPADM

## 2018-06-14 RX ORDER — FINASTERIDE 5 MG/1
5 TABLET, FILM COATED ORAL DAILY
Status: DISCONTINUED | OUTPATIENT
Start: 2018-06-15 | End: 2018-06-15 | Stop reason: SDUPTHER

## 2018-06-14 RX ORDER — ISOSORBIDE MONONITRATE 30 MG/1
30 TABLET, EXTENDED RELEASE ORAL DAILY
Status: DISCONTINUED | OUTPATIENT
Start: 2018-06-14 | End: 2018-06-16 | Stop reason: HOSPADM

## 2018-06-14 RX ORDER — ZOLPIDEM TARTRATE 5 MG/1
5 TABLET ORAL NIGHTLY PRN
Status: DISCONTINUED | OUTPATIENT
Start: 2018-06-14 | End: 2018-06-14 | Stop reason: ALTCHOICE

## 2018-06-14 RX ORDER — SODIUM CHLORIDE 9 MG/ML
INJECTION, SOLUTION INTRAVENOUS CONTINUOUS
Status: DISCONTINUED | OUTPATIENT
Start: 2018-06-14 | End: 2018-06-15

## 2018-06-14 RX ORDER — IPRATROPIUM BROMIDE AND ALBUTEROL SULFATE 2.5; .5 MG/3ML; MG/3ML
3 SOLUTION RESPIRATORY (INHALATION) EVERY 6 HOURS PRN
Status: DISCONTINUED | OUTPATIENT
Start: 2018-06-14 | End: 2018-06-16 | Stop reason: HOSPADM

## 2018-06-14 RX ORDER — ENOXAPARIN SODIUM 100 MG/ML
30 INJECTION SUBCUTANEOUS EVERY 24 HOURS
Status: DISCONTINUED | OUTPATIENT
Start: 2018-06-14 | End: 2018-06-16 | Stop reason: HOSPADM

## 2018-06-14 RX ORDER — CARVEDILOL 6.25 MG/1
6.25 TABLET ORAL 2 TIMES DAILY WITH MEALS
Status: DISCONTINUED | OUTPATIENT
Start: 2018-06-14 | End: 2018-06-16 | Stop reason: HOSPADM

## 2018-06-14 RX ORDER — ACETAMINOPHEN 500 MG
1000 TABLET ORAL EVERY 6 HOURS PRN
Status: DISCONTINUED | OUTPATIENT
Start: 2018-06-14 | End: 2018-06-16 | Stop reason: HOSPADM

## 2018-06-14 RX ORDER — ATORVASTATIN CALCIUM 40 MG/1
80 TABLET, FILM COATED ORAL DAILY
Status: DISCONTINUED | OUTPATIENT
Start: 2018-06-15 | End: 2018-06-15 | Stop reason: SDUPTHER

## 2018-06-14 RX ORDER — MONTELUKAST SODIUM 10 MG/1
10 TABLET ORAL NIGHTLY
Status: DISCONTINUED | OUTPATIENT
Start: 2018-06-14 | End: 2018-06-16 | Stop reason: HOSPADM

## 2018-06-14 RX ORDER — IPRATROPIUM BROMIDE AND ALBUTEROL SULFATE 2.5; .5 MG/3ML; MG/3ML
3 SOLUTION RESPIRATORY (INHALATION) EVERY 6 HOURS
Status: DISCONTINUED | OUTPATIENT
Start: 2018-06-14 | End: 2018-06-14

## 2018-06-14 RX ORDER — FLUTICASONE FUROATE AND VILANTEROL 100; 25 UG/1; UG/1
1 POWDER RESPIRATORY (INHALATION) DAILY
Status: DISCONTINUED | OUTPATIENT
Start: 2018-06-15 | End: 2018-06-16 | Stop reason: HOSPADM

## 2018-06-14 RX ORDER — CEFTRIAXONE 1 G/50ML
1 INJECTION, SOLUTION INTRAVENOUS
Status: DISCONTINUED | OUTPATIENT
Start: 2018-06-14 | End: 2018-06-14

## 2018-06-14 RX ORDER — POTASSIUM CHLORIDE 20 MEQ/15ML
40 SOLUTION ORAL
Status: DISCONTINUED | OUTPATIENT
Start: 2018-06-14 | End: 2018-06-16 | Stop reason: HOSPADM

## 2018-06-14 RX ORDER — ALLOPURINOL 300 MG/1
300 TABLET ORAL DAILY
Status: DISCONTINUED | OUTPATIENT
Start: 2018-06-15 | End: 2018-06-15 | Stop reason: SDUPTHER

## 2018-06-14 RX ORDER — SODIUM CHLORIDE 0.9 % (FLUSH) 0.9 %
5 SYRINGE (ML) INJECTION
Status: DISCONTINUED | OUTPATIENT
Start: 2018-06-14 | End: 2018-06-16 | Stop reason: HOSPADM

## 2018-06-14 RX ADMIN — ENOXAPARIN SODIUM 30 MG: 100 INJECTION SUBCUTANEOUS at 04:06

## 2018-06-14 RX ADMIN — SODIUM CHLORIDE: 0.9 INJECTION, SOLUTION INTRAVENOUS at 04:06

## 2018-06-14 RX ADMIN — STANDARDIZED SENNA CONCENTRATE AND DOCUSATE SODIUM 1 TABLET: 8.6; 5 TABLET, FILM COATED ORAL at 09:06

## 2018-06-14 RX ADMIN — CARVEDILOL 6.25 MG: 6.25 TABLET, FILM COATED ORAL at 04:06

## 2018-06-14 RX ADMIN — ISOSORBIDE MONONITRATE 30 MG: 30 TABLET, EXTENDED RELEASE ORAL at 04:06

## 2018-06-14 RX ADMIN — CEFTRIAXONE 1 G: 1 INJECTION, SOLUTION INTRAVENOUS at 04:06

## 2018-06-14 RX ADMIN — ACETAMINOPHEN 1000 MG: 500 TABLET, FILM COATED ORAL at 07:06

## 2018-06-14 NOTE — HPI
Patient is a 77-year-old with a past medical history significant for CAD status post coronary artery bypass graft, hypertension, dyslipidemia, and stage 5 CKD who presents the hospital with fever, chills, malaise and cough for the past 3-5 days.  Noted sick contacts in the house the patient's grandchildren.  Patient denies any secondary symptoms such as chest pain but does state he is have some chest discomfort associated with cough.  Patient has had multiple admissions in hospitals in the past secondary to severe abdominal pain with an essentially negative workup.  Patient was is seen and evaluated in the emergency department and found to have evidence of left lower lobe pneumonia on CT abdomen.  Patient's CURB -65 score is 2, indicating inpatient treatment.  Patient was also found to be in acute kidney injury and his plan of care was discussed in detail with Nephrology.

## 2018-06-14 NOTE — ED NOTES
Pt presents to ED with c/o generalized illness over the past 2 days. C/o subjective fever with chills and body aches. Also c/o productive cough. Denies SOB. Does c/o chest pain with coughing.  + nausea denies vomiting. Pt placed on BP, pulse ox, cardiac monitor. VSS. Pt has been evaluated by MD and ED work up is in progress. No other needs are identified at this time. Will monitor prn.

## 2018-06-14 NOTE — ASSESSMENT & PLAN NOTE
Patient with known CAD s/p CABG, which is controlled Will continue ASA and monitor for S/Sx of angina/ACS. Continue to monitor on telemetry.

## 2018-06-14 NOTE — SUBJECTIVE & OBJECTIVE
Past Medical History:   Diagnosis Date    Allergy     Arthritis     Gout    BPH (benign prostatic hyperplasia)     CAD (coronary artery disease) 2006    Chronic kidney disease     due to ibuprofen    Colon polyp     Diverticulosis     Gastritis     GERD (gastroesophageal reflux disease)     HTN (hypertension) 3/27/2012    Hyperlipidemia     LVH (left ventricular hypertrophy)     Mesenteric ischemia     Murmur, cardiac 3/27/2012    GRICELDA (obstructive sleep apnea)     DOES NOT USE A MACHINE    Sinus problem     Syncope and collapse        Past Surgical History:   Procedure Laterality Date    APPENDECTOMY      CARDIAC CATHETERIZATION      COLONOSCOPY  2011    COLONOSCOPY N/A 5/3/2018    Procedure: COLONOSCOPY;  Surgeon: Messi Harris MD;  [facility];  Service: Endoscopy;  Laterality: N/A;    CORONARY ARTERY BYPASS GRAFT  4/2007    x 1    JOINT REPLACEMENT      left knee total replacement  X 3    mid leftt finger      from a cactuss    MOLE REMOVAL  2016    rotative cuff      no rotative cuffs on bilat shoulders has pins     SHOULDER SURGERY      shoulder surgery bilat  RIGHT X 4; LEFT X 3       Review of patient's allergies indicates:   Allergen Reactions    Ace inhibitors Other (See Comments)     Cough    Arb-angiotensin receptor antagonist Itching    Eplerenone Other (See Comments)     Marked bradycardia, 40, tiredness and weakness      Sulfa (sulfonamide antibiotics) Itching       No current facility-administered medications on file prior to encounter.      Current Outpatient Prescriptions on File Prior to Encounter   Medication Sig    albuterol 90 mcg/actuation inhaler 2 puffs every 4 hours as needed for cough, wheeze, or shortness of breath    albuterol-ipratropium 2.5mg-0.5mg/3mL (DUO-NEB) 0.5 mg-3 mg(2.5 mg base)/3 mL nebulizer solution INHALE 1 VIAL BY NEBULIZER EVERY 6 HOURS AS NEEDED FOR WHEEZING    allopurinol (ZYLOPRIM) 300 MG tablet Take 1 tablet (300 mg total)  by mouth once daily.    amLODIPine (NORVASC) 10 MG tablet TAKE 1 TABLET BY MOUTH BEFORE DINNER.    aspirin (ECOTRIN) 81 MG EC tablet Take 81 mg by mouth once daily.      atorvastatin (LIPITOR) 80 MG tablet TAKE 1 TABLET (80 MG TOTAL) BY MOUTH ONCE DAILY.    budesonide-formoterol 160-4.5 mcg (SYMBICORT) 160-4.5 mcg/actuation HFAA Inhale 2 puffs into the lungs every 12 (twelve) hours. Controller    calcitRIOL (ROCALTROL) 0.25 MCG Cap     calcium-vitamin D 500-125 mg-unit tablet Take 1 tablet by mouth as needed.     carvedilol (COREG) 6.25 MG tablet TAKE 1 TABLET (6.25 MG TOTAL) BY MOUTH 2 (TWO) TIMES DAILY WITH MEALS.    cyanocobalamin, vitamin B-12, (VITAMIN B-12) 1,000 mcg Subl Take 1 tablet by mouth daily as needed. Takes 3,000mcg    finasteride (PROSCAR) 5 mg tablet TAKE 1 TABLET ONCE DAILY.    fluticasone (FLONASE) 50 mcg/actuation nasal spray 2 sprays (100 mcg total) by Each Nare route once daily. (Patient taking differently: 2 sprays by Each Nare route daily as needed. )    montelukast (SINGULAIR) 10 mg tablet Take 1 tablet (10 mg total) by mouth every evening.    NITROSTAT 0.4 mg SL tablet PLACE 1 TABLET (0.4 MG TOTAL) UNDER THE TONGUE EVERY 5 (FIVE) MINUTES AS NEEDED FOR CHEST PAIN.    tamsulosin (FLOMAX) 0.4 mg Cp24 Take 1 capsule (0.4 mg total) by mouth once daily.    traMADol (ULTRAM) 50 mg tablet Take 1 tablet (50 mg total) by mouth every 8 (eight) hours as needed (No more than 3 per day).    valsartan (DIOVAN) 320 MG tablet Take 1 tablet (320 mg total) by mouth once daily.    zolpidem (AMBIEN) 5 MG Tab TAKE 1 TABLET (5 MG TOTAL) BY MOUTH NIGHTLY AS NEEDED.    lubiprostone (AMITIZA) 24 MCG Cap Take 1 capsule (24 mcg total) by mouth 2 (two) times daily.    sodium chloride (SALINE NASAL MIST) 3 % Mist 1 spray by Nasal route 2 (two) times daily.     Family History     Problem Relation (Age of Onset)    Cancer Father    Heart disease Mother, Sister    Hypertension Brother    Pneumonia Sister     Stroke Sister    Sudden death Father        Social History Main Topics    Smoking status: Never Smoker    Smokeless tobacco: Never Used    Alcohol use No    Drug use: No    Sexual activity: Not on file     Review of Systems   Constitutional: Positive for activity change, appetite change and fatigue. Negative for fever.   HENT: Negative for ear discharge, facial swelling and sore throat.    Eyes: Negative for photophobia, pain and visual disturbance.   Respiratory: Positive for cough. Negative for apnea and shortness of breath.    Cardiovascular: Negative for chest pain and leg swelling.   Gastrointestinal: Negative for abdominal pain and blood in stool.   Endocrine: Negative for cold intolerance and heat intolerance.   Musculoskeletal: Negative for back pain and gait problem.   Skin: Negative for pallor and rash.   Neurological: Negative for speech difficulty and headaches.   Psychiatric/Behavioral: Negative for confusion, hallucinations and suicidal ideas.   All other systems reviewed and are negative.    Objective:     Vital Signs (Most Recent):  Temp: 96.8 °F (36 °C) (06/14/18 1432)  Pulse: 60 (06/14/18 1432)  Resp: 18 (06/14/18 1432)  BP: (!) 140/74 (06/14/18 1432)  SpO2: 100 % (06/14/18 1432) Vital Signs (24h Range):  Temp:  [96.8 °F (36 °C)-98 °F (36.7 °C)] 96.8 °F (36 °C)  Pulse:  [60-62] 60  Resp:  [16-18] 18  SpO2:  [95 %-100 %] 100 %  BP: (116-146)/(62-74) 140/74     Weight: 93.7 kg (206 lb 9.1 oz)  Body mass index is 27.25 kg/m².    Physical Exam   Constitutional: He is oriented to person, place, and time. He appears well-developed and well-nourished. No distress.   HENT:   Head: Normocephalic.   Mouth/Throat: Oropharynx is clear and moist.   Eyes: Conjunctivae are normal. Right eye exhibits no discharge. Left eye exhibits no discharge. No scleral icterus.   Neck: No JVD present.   Cardiovascular: Normal rate, regular rhythm, normal heart sounds and intact distal pulses.  Exam reveals no friction  rub.    No murmur heard.  Pulmonary/Chest: No respiratory distress. He has no wheezes. He has rales (L sided rales with increased WOB).   Abdominal: Soft. Bowel sounds are normal. He exhibits no distension. There is no tenderness.   Musculoskeletal: Normal range of motion. He exhibits no edema.   Lymphadenopathy:     He has no cervical adenopathy.   Neurological: He is alert and oriented to person, place, and time. He has normal reflexes.   Skin: No rash noted. He is not diaphoretic. No erythema.   Psychiatric: He has a normal mood and affect. His behavior is normal.   Nursing note and vitals reviewed.          Significant Labs: All pertinent labs within the past 24 hours have been reviewed.    Significant Imaging: I have reviewed all pertinent imaging results/findings within the past 24 hours.

## 2018-06-14 NOTE — ED PROVIDER NOTES
"Encounter Date: 6/14/2018    SCRIBE #1 NOTE: I, Aimee Agudelo, am scribing for, and in the presence of, Dr. Whitney .       History     Chief Complaint   Patient presents with    Sore Throat     chills, body aches since last pm.       06/14/2018 11:14 AM     Chief complaint: Sore throat       Michela Hunt is a 77 y.o. male with a hx of stage IV kidney disease, HTN, DM, and CAD who presents to the ED with c/o sore throat since this morning associated with productive cough, congestion, chills, CP, generalized body aches since last night. He reports "feeling cold" last night and sweating this morning. He felt febrile but did not measure his temperature. He also reports abd soreness and dark urine. Pt also notes a episode of diarrhea last night. PSHx include CABG for 65% blockage. He is followed by Dr. Rojo who informed him if kidney disease worsens he will be placed on dialysis. Pt is followed by cardiologist Dr. Dunlap. Pt denies nausea, vomiting, blood in stool, rashes, weakness and tobacco use. Allergens include Sulfa.      The history is provided by the patient.     Review of patient's allergies indicates:   Allergen Reactions    Ace inhibitors Other (See Comments)     Cough    Arb-angiotensin receptor antagonist Itching    Eplerenone Other (See Comments)     Marked bradycardia, 40, tiredness and weakness      Sulfa (sulfonamide antibiotics) Itching     Past Medical History:   Diagnosis Date    Allergy     Arthritis     Gout    BPH (benign prostatic hyperplasia)     CAD (coronary artery disease) 2006    Chronic kidney disease     due to ibuprofen    Colon polyp     Diverticulosis     GERD (gastroesophageal reflux disease)     HTN (hypertension) 3/27/2012    Hyperlipidemia     LVH (left ventricular hypertrophy)     Murmur, cardiac 3/27/2012    GRICELDA (obstructive sleep apnea)     DOES NOT USE A MACHINE    Sinus problem     Syncope and collapse      Past Surgical History:   Procedure Laterality " Date    APPENDECTOMY      CARDIAC CATHETERIZATION      COLONOSCOPY  2011    COLONOSCOPY N/A 5/3/2018    Procedure: COLONOSCOPY;  Surgeon: Messi Harris MD;  Location: [facility];  Service: Endoscopy;  Laterality: N/A;    CORONARY ARTERY BYPASS GRAFT  4/2007    x 1    JOINT REPLACEMENT      left knee total replacement  X 3    mid leftt finger      from a cactuss    MOLE REMOVAL  2016    rotative cuff      no rotative cuffs on bilat shoulders has pins     SHOULDER SURGERY      shoulder surgery bilat  RIGHT X 4; LEFT X 3     Family History   Problem Relation Age of Onset    Heart disease Mother     Sudden death Father     Stroke Sister     Heart disease Sister     Kidney cancer Neg Hx     Prostate cancer Neg Hx     Urolithiasis Neg Hx     Allergic rhinitis Neg Hx     Allergies Neg Hx     Angioedema Neg Hx     Asthma Neg Hx     Atopy Neg Hx     Eczema Neg Hx     Immunodeficiency Neg Hx     Rhinitis Neg Hx     Urticaria Neg Hx      Social History   Substance Use Topics    Smoking status: Never Smoker    Smokeless tobacco: Never Used    Alcohol use Yes      Comment: rare     Review of Systems   Constitutional: Positive for chills, diaphoresis and fever (subjective). Negative for activity change, appetite change and fatigue.   HENT: Positive for congestion and sore throat. Negative for rhinorrhea, trouble swallowing and voice change.    Respiratory: Positive for cough. Negative for choking, chest tightness, shortness of breath, wheezing and stridor.    Cardiovascular: Negative for chest pain, palpitations and leg swelling.   Gastrointestinal: Positive for abdominal pain and diarrhea. Negative for abdominal distention, blood in stool, constipation, nausea and vomiting.   Genitourinary: Negative for difficulty urinating, dysuria, flank pain, frequency and hematuria.   Musculoskeletal: Positive for myalgias. Negative for arthralgias, back pain, joint swelling, neck pain and neck stiffness.    Skin: Negative for color change and rash.   Neurological: Negative for dizziness, syncope, speech difficulty, weakness, numbness and headaches.   Hematological: Negative for adenopathy. Does not bruise/bleed easily.   All other systems reviewed and are negative.      Physical Exam     Initial Vitals [06/14/18 1056]   BP Pulse Resp Temp SpO2   116/65 62 16 98 °F (36.7 °C) 97 %      MAP       --         Physical Exam    Nursing note and vitals reviewed.  Constitutional: He appears well-developed and well-nourished. He is not diaphoretic. No distress.   HENT:   Head: Normocephalic and atraumatic.   Mouth/Throat: Oropharynx is clear and moist and mucous membranes are normal.   Eyes: EOM are normal.   Eyes are 3 and reactive to light.    Neck: Neck supple. No tracheal deviation present.   Trachea midline. No cervical adenopathy.    Cardiovascular: Normal rate, regular rhythm and normal heart sounds. Exam reveals no gallop and no friction rub.    No murmur heard.  Pulses:       Radial pulses are 2+ on the right side, and 2+ on the left side.        Posterior tibial pulses are 2+ on the right side, and 2+ on the left side.   Pulmonary/Chest: He has no wheezes. He has no rhonchi. He has rales (Mild at bases bilaterally ).   Abdominal: Soft. Bowel sounds are normal. He exhibits no distension. There is no tenderness. There is no rebound, no guarding and no CVA tenderness.   Epigastric abd pain.    Musculoskeletal:   No step-off, deformity, or bony tenderness of the spine. Full ROM in LE's. Negative straight leg raise, bilaterally. No edema noted.    Lymphadenopathy:     He has no cervical adenopathy.   Neurological: He is alert and oriented to person, place, and time. He has normal strength. No cranial nerve deficit or sensory deficit.   Skin: Skin is warm and dry. No rash noted.         ED Course   Procedures  Labs Reviewed   CBC W/ AUTO DIFFERENTIAL - Abnormal; Notable for the following:        Result Value    WBC 15.80  (*)     RBC 3.13 (*)     Hemoglobin 9.8 (*)     Hematocrit 28.9 (*)     MCH 31.4 (*)     MPV 7.9 (*)     Gran # (ANC) 13.2 (*)     Gran% 83.4 (*)     Lymph% 10.9 (*)     All other components within normal limits   COMPREHENSIVE METABOLIC PANEL - Abnormal; Notable for the following:     Sodium 135 (*)     BUN, Bld 46 (*)     Creatinine 5.9 (*)     eGFR if  10 (*)     eGFR if non  8 (*)     All other components within normal limits   TROPONIN I - Abnormal; Notable for the following:     Troponin I 0.037 (*)     All other components within normal limits   B-TYPE NATRIURETIC PEPTIDE - Abnormal; Notable for the following:      (*)     All other components within normal limits   URINALYSIS - Abnormal; Notable for the following:     Protein, UA 3+ (*)     Occult Blood UA Trace (*)     All other components within normal limits   LIPASE - Abnormal; Notable for the following:     Lipase 93 (*)     All other components within normal limits   URINALYSIS MICROSCOPIC - Abnormal; Notable for the following:     Hyaline Casts, UA 3 (*)     All other components within normal limits   THROAT SCREEN, RAPID   CULTURE, BLOOD   CULTURE, BLOOD   CULTURE, STREP A,  THROAT   PROTIME-INR   LACTIC ACID, PLASMA     EKG Readings: (Independently Interpreted)   Initial Reading: No STEMI. Rhythm: Sinus Bradycardia. Heart Rate: 58. Ectopy: No Ectopy. Conduction: Normal. Axis: Normal.       CT Abdomen Pelvis  Without Contrast   Final Result      No evidence for small bowel obstruction.      Moderate left lower lobe consolidation compatible with pneumonia.      Severe atherosclerosis.  Chronic pancreatitis.  Bilateral renal cysts.  Prostatomegaly.  Diverticulosis.  Cholecystectomy.         Electronically signed by: Huang Mayer MD   Date:    06/14/2018   Time:    13:17      X-Ray Abdomen Flat And Erect   Final Result      Nonspecific mild generalized increase in the amount of bowel gas with very mild  dilatation of small bowel with air-fluid levels.  This could reflect mild ileus or early small bowel obstruction         Electronically signed by: Mayuri Castro MD   Date:    06/14/2018   Time:    12:20      X-Ray Chest AP Portable   Final Result      No acute cardiopulmonary disease or significant change compared to the prior exam         Electronically signed by: Mayuri Castro MD   Date:    06/14/2018   Time:    12:18        X-Rays:   Independently Interpreted Readings:   Chest X-Ray: Normal heart size.  No infiltrates.  No acute abnormalities.   Abdomen:   Flat and Erect of Abdomen - Possible small-bowel obstruction versus ileus   Abdomen and Pelvis CT with Contrast - No masses.  Normal vessels.  No acute changes. Patient has left lower lobe consolidative pneumonia     Medical Decision Making:   History:   Old Medical Records: I decided to obtain old medical records.  Initial Assessment:   Emergent evaluation of a 77 y.o male presenting with cough, sore throat, congestion, chest discomfort, and epigastric abd pain   Differential Diagnosis:   My differential diagnosis includes infection and ACS.    Clinical Tests:   Lab Tests: Ordered and Reviewed  Radiological Study: Reviewed and Ordered  Medical Tests: Reviewed and Ordered            Scribe Attestation:   Scribe #1: I performed the above scribed service and the documentation accurately describes the services I performed. I attest to the accuracy of the note.    Attending Attestation:             Attending ED Notes:   Given consolidative pneumonia and recent hospitalization patient is diagnosed with hospital-acquired pneumonia and will be started on IV antibiotics patient admitted to Internal Medicine for further evaluation and management.   I, Dr. Saul Whitney, personally performed the services described in this documentation. All medical record entries made by the scribe were at my direction and in my presence.  I have reviewed the chart and agree that the  record reflects my personal performance and is accurate and complete. Saul Grimaldo MD.                 Clinical Impression:     1. Pneumonia of left lower lobe due to infectious organism    2. Shortness of breath    3. Epigastric pain                                 Saul Whitney MD  06/14/18 8319

## 2018-06-14 NOTE — ASSESSMENT & PLAN NOTE
Patient is chronically on statin. Will continue for now. Monitor clinically. Last LDL was   Lab Results   Component Value Date    LDLCALC 47.6 (L) 04/25/2018

## 2018-06-14 NOTE — ASSESSMENT & PLAN NOTE
Patient with intermittent episodes of abdominal pain with an essentially negative workup.  Etiology likely is related to mesenteric ischemia given the patient's history and risk factors.  Will start patient on low-dose nitrate and monitor.

## 2018-06-14 NOTE — ASSESSMENT & PLAN NOTE
Patient's anemia is currently controlled. S/p 0 units of PRBCs. Etiology likely d/t CKD stage 5  Current CBC reviewed-   Lab Results   Component Value Date    HGB 9.8 (L) 06/14/2018    HCT 28.9 (L) 06/14/2018     Monitor serial CBC and transfuse if patient becomes hemodynamically unstable, symptomatic or H/H drops below 7/21.

## 2018-06-14 NOTE — ASSESSMENT & PLAN NOTE
CHF currently controlled. Latest ECHO shows ejection fraction of   EF   Date Value Ref Range Status   06/06/2018 47  Final     Nuc Stress EF   Date Value Ref Range Status   06/06/2018 47 % Final     Nuc Rest EF   Date Value Ref Range Status   06/06/2018 41  Final   Continue BB and hold ARB and Lasix secondary to NICK.  Monitor clinical status closely. Monitor on telemetry. Last BNP is   Recent Labs  Lab 06/14/18  1151   *   . Continue to stress to patient importance of self efficacy and  on diet for CHF.

## 2018-06-14 NOTE — ASSESSMENT & PLAN NOTE
Patient with lobar pneumonia noted in the left lower lobe.  Will start treatment with IV antibiotics specifically for lobar pneumonia, Rocephin 1 g IV 24.  Continue cough suppressants, and supplemental O2 for O2 sats greater than 92%.  If patient is doing well likely be able to go home in the next 1-2 days.

## 2018-06-14 NOTE — H&P
[facility] Hospital Medicine  History & Physical    Patient Name: Micheal Hunt  MRN: [id number]  Admission Date: 6/14/2018  Attending Physician: Tim Arce MD  Primary Care Provider: Pk Lakhani MD         Patient information was obtained from patient, relative(s) and ER records.     Subjective:     Principal Problem:LLL pneumonia    Chief Complaint:   Chief Complaint   Patient presents with    Sore Throat     chills, body aches since last pm.        HPI: Patient is a 77-year-old with a past medical history significant for CAD status post coronary artery bypass graft, hypertension, dyslipidemia, and stage 5 CKD who presents the hospital with fever, chills, malaise and cough for the past 3-5 days.  Noted sick contacts in the house the patient's grandchildren.  Patient denies any secondary symptoms such as chest pain but does state he is have some chest discomfort associated with cough.  Patient has had multiple admissions in hospitals in the past secondary to severe abdominal pain with an essentially negative workup.  Patient was is seen and evaluated in the emergency department and found to have evidence of left lower lobe pneumonia on CT abdomen.  Patient's CURB -65 score is 2, indicating inpatient treatment.  Patient was also found to be in acute kidney injury and his plan of care was discussed in detail with Nephrology.    Past Medical History:   Diagnosis Date    Allergy     Arthritis     Gout    BPH (benign prostatic hyperplasia)     CAD (coronary artery disease) 2006    Chronic kidney disease     due to ibuprofen    Colon polyp     Diverticulosis     Gastritis     GERD (gastroesophageal reflux disease)     HTN (hypertension) 3/27/2012    Hyperlipidemia     LVH (left ventricular hypertrophy)     Mesenteric ischemia     Murmur, cardiac 3/27/2012    GRICELDA (obstructive sleep apnea)     DOES NOT USE A MACHINE    Sinus problem     Syncope and collapse        Past  Surgical History:   Procedure Laterality Date    APPENDECTOMY      CARDIAC CATHETERIZATION      COLONOSCOPY  2011    COLONOSCOPY N/A 5/3/2018    Procedure: COLONOSCOPY;  Surgeon: Messi Harris MD;  Location: [facility];  Service: Endoscopy;  Laterality: N/A;    CORONARY ARTERY BYPASS GRAFT  4/2007    x 1    JOINT REPLACEMENT      left knee total replacement  X 3    mid leftt finger      from a cactuss    MOLE REMOVAL  2016    rotative cuff      no rotative cuffs on bilat shoulders has pins     SHOULDER SURGERY      shoulder surgery bilat  RIGHT X 4; LEFT X 3       Review of patient's allergies indicates:   Allergen Reactions    Ace inhibitors Other (See Comments)     Cough    Arb-angiotensin receptor antagonist Itching    Eplerenone Other (See Comments)     Marked bradycardia, 40, tiredness and weakness      Sulfa (sulfonamide antibiotics) Itching       No current facility-administered medications on file prior to encounter.      Current Outpatient Prescriptions on File Prior to Encounter   Medication Sig    albuterol 90 mcg/actuation inhaler 2 puffs every 4 hours as needed for cough, wheeze, or shortness of breath    albuterol-ipratropium 2.5mg-0.5mg/3mL (DUO-NEB) 0.5 mg-3 mg(2.5 mg base)/3 mL nebulizer solution INHALE 1 VIAL BY NEBULIZER EVERY 6 HOURS AS NEEDED FOR WHEEZING    allopurinol (ZYLOPRIM) 300 MG tablet Take 1 tablet (300 mg total) by mouth once daily.    amLODIPine (NORVASC) 10 MG tablet TAKE 1 TABLET BY MOUTH BEFORE DINNER.    aspirin (ECOTRIN) 81 MG EC tablet Take 81 mg by mouth once daily.      atorvastatin (LIPITOR) 80 MG tablet TAKE 1 TABLET (80 MG TOTAL) BY MOUTH ONCE DAILY.    budesonide-formoterol 160-4.5 mcg (SYMBICORT) 160-4.5 mcg/actuation HFAA Inhale 2 puffs into the lungs every 12 (twelve) hours. Controller    calcitRIOL (ROCALTROL) 0.25 MCG Cap     calcium-vitamin D 500-125 mg-unit tablet Take 1 tablet by mouth as needed.     carvedilol (COREG) 6.25 MG tablet  TAKE 1 TABLET (6.25 MG TOTAL) BY MOUTH 2 (TWO) TIMES DAILY WITH MEALS.    cyanocobalamin, vitamin B-12, (VITAMIN B-12) 1,000 mcg Subl Take 1 tablet by mouth daily as needed. Takes 3,000mcg    finasteride (PROSCAR) 5 mg tablet TAKE 1 TABLET ONCE DAILY.    fluticasone (FLONASE) 50 mcg/actuation nasal spray 2 sprays (100 mcg total) by Each Nare route once daily. (Patient taking differently: 2 sprays by Each Nare route daily as needed. )    montelukast (SINGULAIR) 10 mg tablet Take 1 tablet (10 mg total) by mouth every evening.    NITROSTAT 0.4 mg SL tablet PLACE 1 TABLET (0.4 MG TOTAL) UNDER THE TONGUE EVERY 5 (FIVE) MINUTES AS NEEDED FOR CHEST PAIN.    tamsulosin (FLOMAX) 0.4 mg Cp24 Take 1 capsule (0.4 mg total) by mouth once daily.    traMADol (ULTRAM) 50 mg tablet Take 1 tablet (50 mg total) by mouth every 8 (eight) hours as needed (No more than 3 per day).    valsartan (DIOVAN) 320 MG tablet Take 1 tablet (320 mg total) by mouth once daily.    zolpidem (AMBIEN) 5 MG Tab TAKE 1 TABLET (5 MG TOTAL) BY MOUTH NIGHTLY AS NEEDED.    lubiprostone (AMITIZA) 24 MCG Cap Take 1 capsule (24 mcg total) by mouth 2 (two) times daily.    sodium chloride (SALINE NASAL MIST) 3 % Mist 1 spray by Nasal route 2 (two) times daily.     Family History     Problem Relation (Age of Onset)    Cancer Father    Heart disease Mother, Sister    Hypertension Brother    Pneumonia Sister    Stroke Sister    Sudden death Father        Social History Main Topics    Smoking status: Never Smoker    Smokeless tobacco: Never Used    Alcohol use No    Drug use: No    Sexual activity: Not on file     Review of Systems   Constitutional: Positive for activity change, appetite change and fatigue. Negative for fever.   HENT: Negative for ear discharge, facial swelling and sore throat.    Eyes: Negative for photophobia, pain and visual disturbance.   Respiratory: Positive for cough. Negative for apnea and shortness of breath.    Cardiovascular:  Negative for chest pain and leg swelling.   Gastrointestinal: Negative for abdominal pain and blood in stool.   Endocrine: Negative for cold intolerance and heat intolerance.   Musculoskeletal: Negative for back pain and gait problem.   Skin: Negative for pallor and rash.   Neurological: Negative for speech difficulty and headaches.   Psychiatric/Behavioral: Negative for confusion, hallucinations and suicidal ideas.   All other systems reviewed and are negative.    Objective:     Vital Signs (Most Recent):  Temp: 96.8 °F (36 °C) (06/14/18 1432)  Pulse: 60 (06/14/18 1432)  Resp: 18 (06/14/18 1432)  BP: (!) 140/74 (06/14/18 1432)  SpO2: 100 % (06/14/18 1432) Vital Signs (24h Range):  Temp:  [96.8 °F (36 °C)-98 °F (36.7 °C)] 96.8 °F (36 °C)  Pulse:  [60-62] 60  Resp:  [16-18] 18  SpO2:  [95 %-100 %] 100 %  BP: (116-146)/(62-74) 140/74     Weight: 93.7 kg (206 lb 9.1 oz)  Body mass index is 27.25 kg/m².    Physical Exam   Constitutional: He is oriented to person, place, and time. He appears well-developed and well-nourished. No distress.   HENT:   Head: Normocephalic.   Mouth/Throat: Oropharynx is clear and moist.   Eyes: Conjunctivae are normal. Right eye exhibits no discharge. Left eye exhibits no discharge. No scleral icterus.   Neck: No JVD present.   Cardiovascular: Normal rate, regular rhythm, normal heart sounds and intact distal pulses.  Exam reveals no friction rub.    No murmur heard.  Pulmonary/Chest: No respiratory distress. He has no wheezes. He has rales (L sided rales with increased WOB).   Abdominal: Soft. Bowel sounds are normal. He exhibits no distension. There is no tenderness.   Musculoskeletal: Normal range of motion. He exhibits no edema.   Lymphadenopathy:     He has no cervical adenopathy.   Neurological: He is alert and oriented to person, place, and time. He has normal reflexes.   Skin: No rash noted. He is not diaphoretic. No erythema.   Psychiatric: He has a normal mood and affect. His  behavior is normal.   Nursing note and vitals reviewed.          Significant Labs: All pertinent labs within the past 24 hours have been reviewed.    Significant Imaging: I have reviewed all pertinent imaging results/findings within the past 24 hours.    Assessment/Plan:     * LLL pneumonia    Patient with lobar pneumonia noted in the left lower lobe.  Will start treatment with IV antibiotics specifically for lobar pneumonia, Rocephin 1 g IV 24.  Continue cough suppressants, and supplemental O2 for O2 sats greater than 92%.  If patient is doing well likely be able to go home in the next 1-2 days.          NICK (acute kidney injury)    Patient with acute kidney injury likely secondary to dehydration.  Will give gentle IV fluids and consult with Nephrology.  Patient's baseline GFR is approximately 20.  He is nonoliguric currently.  No acute need for dialysis.  Defer further evaluation and management to Nephrology.          Chronic kidney disease, stage 5    Treatment per above with acute kidney injury.  Chronic.            Generalized abdominal pain    Patient with intermittent episodes of abdominal pain with an essentially negative workup.  Etiology likely is related to mesenteric ischemia given the patient's history and risk factors.  Will start patient on low-dose nitrate and monitor.          Thrombocytopenia    Chronic problem, controlled. Will monitor.            Anemia due to stage 4 chronic kidney disease    Patient's anemia is currently controlled. S/p 0 units of PRBCs. Etiology likely d/t CKD stage 5  Current CBC reviewed-   Lab Results   Component Value Date    HGB 9.8 (L) 06/14/2018    HCT 28.9 (L) 06/14/2018     Monitor serial CBC and transfuse if patient becomes hemodynamically unstable, symptomatic or H/H drops below 7/21.             Diastolic dysfunction    CHF currently controlled. Latest ECHO shows ejection fraction of   EF   Date Value Ref Range Status   06/06/2018 47  Final     Nuc Stress EF   Date  Value Ref Range Status   06/06/2018 47 % Final     Nuc Rest EF   Date Value Ref Range Status   06/06/2018 41  Final   Continue BB and hold ARB and Lasix secondary to NICK.  Monitor clinical status closely. Monitor on telemetry. Last BNP is   Recent Labs  Lab 06/14/18  1151   *   . Continue to stress to patient importance of self efficacy and  on diet for CHF.            CAD (coronary artery disease), 2V CABG 2007    Patient with known CAD s/p CABG, which is controlled Will continue ASA and monitor for S/Sx of angina/ACS. Continue to monitor on telemetry.             Hyperlipidemia, baseline      Patient is chronically on statin. Will continue for now. Monitor clinically. Last LDL was   Lab Results   Component Value Date    LDLCALC 47.6 (L) 04/25/2018                Benign essential HTN    Chronic, controlled.  Monitor BP closely.  Will continue BP medications as needed for sustained BP control.              VTE Risk Mitigation         Ordered     enoxaparin injection 30 mg  Daily      06/14/18 1510     IP VTE HIGH RISK PATIENT  Once      06/14/18 1510             Tim Arce MD  Department of Hospital Medicine   [facility]

## 2018-06-14 NOTE — ASSESSMENT & PLAN NOTE
Patient with acute kidney injury likely secondary to dehydration.  Will give gentle IV fluids and consult with Nephrology.  Patient's baseline GFR is approximately 20.  He is nonoliguric currently.  No acute need for dialysis.  Defer further evaluation and management to Nephrology.

## 2018-06-15 LAB
ALBUMIN SERPL BCP-MCNC: 3.3 G/DL
ALP SERPL-CCNC: 57 U/L
ALT SERPL W/O P-5'-P-CCNC: 10 U/L
ANION GAP SERPL CALC-SCNC: 6 MMOL/L
AST SERPL-CCNC: 11 U/L
BASOPHILS # BLD AUTO: 0.1 K/UL
BASOPHILS NFR BLD: 0.4 %
BILIRUB SERPL-MCNC: 0.4 MG/DL
BUN SERPL-MCNC: 50 MG/DL
CALCIUM SERPL-MCNC: 9.7 MG/DL
CHLORIDE SERPL-SCNC: 101 MMOL/L
CO2 SERPL-SCNC: 27 MMOL/L
CREAT SERPL-MCNC: 6.2 MG/DL
DIFFERENTIAL METHOD: ABNORMAL
EOSINOPHIL # BLD AUTO: 0.3 K/UL
EOSINOPHIL NFR BLD: 2.6 %
ERYTHROCYTE [DISTWIDTH] IN BLOOD BY AUTOMATED COUNT: 14 %
EST. GFR  (AFRICAN AMERICAN): 9 ML/MIN/1.73 M^2
EST. GFR  (NON AFRICAN AMERICAN): 8 ML/MIN/1.73 M^2
GLUCOSE SERPL-MCNC: 106 MG/DL
HCT VFR BLD AUTO: 25.9 %
HGB BLD-MCNC: 8.6 G/DL
LYMPHOCYTES # BLD AUTO: 2 K/UL
LYMPHOCYTES NFR BLD: 16.9 %
MAGNESIUM SERPL-MCNC: 2.3 MG/DL
MCH RBC QN AUTO: 31.1 PG
MCHC RBC AUTO-ENTMCNC: 33.3 G/DL
MCV RBC AUTO: 93 FL
MONOCYTES # BLD AUTO: 0.7 K/UL
MONOCYTES NFR BLD: 5.7 %
NEUTROPHILS # BLD AUTO: 9 K/UL
NEUTROPHILS NFR BLD: 74.4 %
PHOSPHATE SERPL-MCNC: 4.8 MG/DL
PLATELET # BLD AUTO: 128 K/UL
PMV BLD AUTO: 8.1 FL
POTASSIUM SERPL-SCNC: 4.5 MMOL/L
PROT SERPL-MCNC: 6.1 G/DL
RBC # BLD AUTO: 2.77 M/UL
SODIUM SERPL-SCNC: 134 MMOL/L
WBC # BLD AUTO: 12.1 K/UL

## 2018-06-15 PROCEDURE — 80053 COMPREHEN METABOLIC PANEL: CPT | Mod: NTX

## 2018-06-15 PROCEDURE — 63600175 PHARM REV CODE 636 W HCPCS: Mod: NTX | Performed by: HOSPITALIST

## 2018-06-15 PROCEDURE — 12000002 HC ACUTE/MED SURGE SEMI-PRIVATE ROOM: Mod: NTX

## 2018-06-15 PROCEDURE — 25000003 PHARM REV CODE 250: Mod: NTX | Performed by: HOSPITALIST

## 2018-06-15 PROCEDURE — 97161 PT EVAL LOW COMPLEX 20 MIN: CPT | Mod: NTX

## 2018-06-15 PROCEDURE — 84100 ASSAY OF PHOSPHORUS: CPT | Mod: NTX

## 2018-06-15 PROCEDURE — 85025 COMPLETE CBC W/AUTO DIFF WBC: CPT | Mod: NTX

## 2018-06-15 PROCEDURE — 99900035 HC TECH TIME PER 15 MIN (STAT): Mod: NTX

## 2018-06-15 PROCEDURE — 83735 ASSAY OF MAGNESIUM: CPT | Mod: NTX

## 2018-06-15 PROCEDURE — 36415 COLL VENOUS BLD VENIPUNCTURE: CPT | Mod: NTX

## 2018-06-15 PROCEDURE — 94761 N-INVAS EAR/PLS OXIMETRY MLT: CPT | Mod: NTX

## 2018-06-15 RX ORDER — AMLODIPINE BESYLATE 5 MG/1
10 TABLET ORAL NIGHTLY
Status: DISCONTINUED | OUTPATIENT
Start: 2018-06-15 | End: 2018-06-16 | Stop reason: HOSPADM

## 2018-06-15 RX ORDER — ATORVASTATIN CALCIUM 40 MG/1
80 TABLET, FILM COATED ORAL NIGHTLY
Status: DISCONTINUED | OUTPATIENT
Start: 2018-06-15 | End: 2018-06-16 | Stop reason: HOSPADM

## 2018-06-15 RX ORDER — FINASTERIDE 5 MG/1
5 TABLET, FILM COATED ORAL NIGHTLY
Status: DISCONTINUED | OUTPATIENT
Start: 2018-06-15 | End: 2018-06-16 | Stop reason: HOSPADM

## 2018-06-15 RX ORDER — ALLOPURINOL 300 MG/1
300 TABLET ORAL NIGHTLY
Status: DISCONTINUED | OUTPATIENT
Start: 2018-06-15 | End: 2018-06-16 | Stop reason: HOSPADM

## 2018-06-15 RX ADMIN — AMLODIPINE BESYLATE 10 MG: 5 TABLET ORAL at 08:06

## 2018-06-15 RX ADMIN — STANDARDIZED SENNA CONCENTRATE AND DOCUSATE SODIUM 1 TABLET: 8.6; 5 TABLET, FILM COATED ORAL at 09:06

## 2018-06-15 RX ADMIN — ATORVASTATIN CALCIUM 80 MG: 40 TABLET, FILM COATED ORAL at 08:06

## 2018-06-15 RX ADMIN — CARVEDILOL 6.25 MG: 6.25 TABLET, FILM COATED ORAL at 05:06

## 2018-06-15 RX ADMIN — STANDARDIZED SENNA CONCENTRATE AND DOCUSATE SODIUM 1 TABLET: 8.6; 5 TABLET, FILM COATED ORAL at 08:06

## 2018-06-15 RX ADMIN — CARVEDILOL 6.25 MG: 6.25 TABLET, FILM COATED ORAL at 08:06

## 2018-06-15 RX ADMIN — ALLOPURINOL 300 MG: 300 TABLET ORAL at 08:06

## 2018-06-15 RX ADMIN — CEFTRIAXONE 1 G: 1 INJECTION, SOLUTION INTRAVENOUS at 02:06

## 2018-06-15 RX ADMIN — ISOSORBIDE MONONITRATE 30 MG: 30 TABLET, EXTENDED RELEASE ORAL at 09:06

## 2018-06-15 RX ADMIN — FINASTERIDE 5 MG: 5 TABLET, FILM COATED ORAL at 08:06

## 2018-06-15 RX ADMIN — TAMSULOSIN HYDROCHLORIDE 0.4 MG: 0.4 CAPSULE ORAL at 09:06

## 2018-06-15 RX ADMIN — ASPIRIN 81 MG: 81 TABLET, COATED ORAL at 09:06

## 2018-06-15 NOTE — PLAN OF CARE
Problem: Patient Care Overview  Goal: Plan of Care Review  Medications reviewed and administered  Bed low/wheels locked  Call light within reach  Safety maintained  Telemetry

## 2018-06-15 NOTE — PLAN OF CARE
Problem: Physical Therapy Goal  Goal: Physical Therapy Goal  Outcome: Outcome(s) achieved Date Met: 06/15/18  PT eval and tx completed. Pt does not demonstrate further need for PT services. Performed gait activity x 250ft I with no s/s of distress.

## 2018-06-15 NOTE — ASSESSMENT & PLAN NOTE
Chronic, controlled.  Monitor BP closely.  Will continue BP medications as needed for sustained BP control.  Hypertension Medications             amLODIPine (NORVASC) 10 MG tablet TAKE 1 TABLET BY MOUTH BEFORE DINNER.    carvedilol (COREG) 6.25 MG tablet TAKE 1 TABLET (6.25 MG TOTAL) BY MOUTH 2 (TWO) TIMES DAILY WITH MEALS.    NITROSTAT 0.4 mg SL tablet PLACE 1 TABLET (0.4 MG TOTAL) UNDER THE TONGUE EVERY 5 (FIVE) MINUTES AS NEEDED FOR CHEST PAIN.    valsartan (DIOVAN) 320 MG tablet Take 1 tablet (320 mg total) by mouth once daily.      Hospital Medications             amLODIPine tablet 10 mg Take 2 tablets (10 mg total) by mouth every evening.    carvedilol tablet 6.25 mg Take 1 tablet (6.25 mg total) by mouth 2 (two) times daily with meals.    isosorbide mononitrate 24 hr tablet 30 mg Take 1 tablet (30 mg total) by mouth once daily.    amLODIPine tablet 10 mg (Discontinued) Take 2 tablets (10 mg total) by mouth once daily.

## 2018-06-15 NOTE — ASSESSMENT & PLAN NOTE
Patient with acute kidney injury likely secondary to dehydration.--Improving     Will continue  gentle IV fluids and consult with Nephrology.    Patient's baseline GFR is approximately 20.  He is nonoliguric currently.  No acute need for dialysis.  Defer further evaluation and management to Nephrology.

## 2018-06-15 NOTE — PT/OT/SLP EVAL
Physical Therapy Evaluation and Discharge Note    Patient Name:  Micheal Hunt   MRN:  [id number]    Recommendations:     Discharge Recommendations:  home   Discharge Equipment Recommendations: none   Barriers to discharge: None    Assessment:     Micheal Hunt is a 77 y.o. male admitted with a medical diagnosis of LLL pneumonia. .  At this time, patient is functioning at their prior level of function and does not require further acute PT services.     Recent Surgery: * No surgery found *      Plan:     During this hospitalization, patient does not require further acute PT services.  Please re-consult if situation changes.     Plan of Care Reviewed with: patient    Subjective     Communicated with nurse prior to session.  Patient found standing upon PT entry to room, agreeable to evaluation.      Chief Complaint: chills and shakes prior to admit  Patient comments/goals: to go home  Pain/Comfort:  · Pain Rating 1: 0/10    Patients cultural, spiritual, Judaism conflicts given the current situation:      Living Environment:  Pt lives alone, no steps/stairs, intermittently uses a cane when L knee swells (hx of TKR)  Prior to admission, patients level of function was independent with all mobility.  Patient has the following equipment: none.  DME owned (not currently used): single point cane and.  Upon discharge, patient will have assistance from family.    Objective:     Patient found with: telemetry     General Precautions: Standard, fall   Orthopedic Precautions:N/A, Full weight bearing   Braces: N/A     Exams:  · RLE ROM: WNL  · RLE Strength: WNL  · LLE ROM: WNL  · LLE Strength: WNL    Functional Mobility:  · Transfers:     · Sit to Stand:  independence with no AD  · Gait: 250ft I with no s/s of distress    AM-PAC 6 CLICK MOBILITY  Total Score:24       Therapeutic Activities and Exercises:       Patient left sitting EOB with call button in reach and nurse notified.    GOALS:    Physical Therapy Goals      Not on file          Multidisciplinary Problems (Resolved)        Problem: Physical Therapy Goal    Goal Priority Disciplines Outcome Goal Variances Interventions   Physical Therapy Goal   (Resolved)     PT/OT, PT Outcome(s) achieved                     History:     Past Medical History:   Diagnosis Date    Allergy     Arthritis     Gout    BPH (benign prostatic hyperplasia)     CAD (coronary artery disease) 2006    Chronic kidney disease     due to ibuprofen    Colon polyp     Diverticulosis     Gastritis     GERD (gastroesophageal reflux disease)     HTN (hypertension) 3/27/2012    Hyperlipidemia     LVH (left ventricular hypertrophy)     Mesenteric ischemia     Murmur, cardiac 3/27/2012    GRICELDA (obstructive sleep apnea)     DOES NOT USE A MACHINE    Sinus problem     Syncope and collapse        Past Surgical History:   Procedure Laterality Date    APPENDECTOMY      CARDIAC CATHETERIZATION      COLONOSCOPY  2011    COLONOSCOPY N/A 5/3/2018    Procedure: COLONOSCOPY;  Surgeon: Messi Harris MD;  [facility];  Service: Endoscopy;  Laterality: N/A;    CORONARY ARTERY BYPASS GRAFT  4/2007    x 1    JOINT REPLACEMENT      left knee total replacement  X 3    mid leftt finger      from a cactuss    MOLE REMOVAL  2016    rotative cuff      no rotative cuffs on bilat shoulders has pins     SHOULDER SURGERY      shoulder surgery bilat  RIGHT X 4; LEFT X 3       Clinical Decision Making:     History  Co-morbidities and personal factors that may impact the plan of care Examination  Body Structures and Functions, activity limitations and participation restrictions that may impact the plan of care Clinical Presentation   Decision Making/ Complexity Score   Co-morbidities:   [] Time since onset of injury / illness / exacerbation  [] Status of current condition  []Patient's cognitive status and safety concerns    [] Multiple Medical Problems (see med hx)  Personal Factors:   [] Patient's  age  [] Prior Level of function   [] Patient's home situation (environment and family support)  [] Patient's level of motivation  [] Expected progression of patient      HISTORY:(criteria)    [] 18364 - no personal factors/history    [] 62569 - has 1-2 personal factor/comorbidity     [] 71265 - has >3 personal factor/comorbidity     Body Regions:  [] Objective examination findings  [] Head     []  Neck  [] Trunk   [] Upper Extremity  [] Lower Extremity    Body Systems:  [] For communication ability, affect, cognition, language, and learning style: the assessment of the ability to make needs known, consciousness, orientation (person, place, and time), expected emotional /behavioral responses, and learning preferences (eg, learning barriers, education  needs)  [] For the neuromuscular system: a general assessment of gross coordinated movement (eg, balance, gait, locomotion, transfers, and transitions) and motor function  (motor control and motor learning)  [] For the musculoskeletal system: the assessment of gross symmetry, gross range of motion, gross strength, height, and weight  [] For the integumentary system: the assessment of pliability(texture), presence of scar formation, skin color, and skin integrity  [] For cardiovascular/pulmonary system: the assessment of heart rate, respiratory rate, blood pressure, and edema     Activity limitations:    [] Patient's cognitive status and saf ety concerns          [] Status of current condition      [] Weight bearing restriction  [] Cardiopulmunary Restriction    Participation Restrictions:   [] Goals and goal agreement with the patient     [] Rehab potential (prognosis) and probable outcome      Examination of Body System: (criteria)    [] 23280 - addressing 1-2 elements    [] 66251 - addressing a total of 3 or more elements     [] 17417 -  Addressing a total of 4 or more elements         Clinical Presentation: (criteria)  Choose one     On examination of body system  using standardized tests and measures patient presents with (CHOOSE ONE) elements from any of the following: body structures and functions, activity limitations, and/or participation restrictions.  Leading to a clinical presentation that is considered (CHOOSE ONE)                              Clinical Decision Making  (Eval Complexity):  Choose One     Time Tracking:     PT Received On: 06/15/18  PT Start Time: 0916     PT Stop Time: 0926  PT Total Time (min): 10 min     Billable Minutes: Evaluation 10      Malaika Cerrato PT  06/15/2018

## 2018-06-15 NOTE — PROGRESS NOTES
06/14/18 2041   Patient Assessment/Suction   Level of Consciousness (AVPU) alert   Respiratory Effort Normal;Unlabored   All Lung Fields Breath Sounds clear   PRE-TX-O2-ETCO2   O2 Device (Oxygen Therapy) room air   SpO2 96 %   Pulse Oximetry Type Intermittent   Pulse 68   Resp 18   Aerosol Therapy   $ Aerosol Therapy Charges PRN treatment not required   Respiratory Treatment Status PRN treatment not required

## 2018-06-15 NOTE — SUBJECTIVE & OBJECTIVE
Interval History: pt seen/examined-  No sob, cough brownish phlegm has improved  No cp    Review of Systems   Constitutional: Positive for activity change, appetite change and fatigue. Negative for fever.   HENT: Negative for ear discharge, facial swelling and sore throat.    Eyes: Negative for photophobia, pain and visual disturbance.   Respiratory: Positive for cough. Negative for apnea and shortness of breath.    Cardiovascular: Negative for chest pain and leg swelling.   Gastrointestinal: Negative for abdominal pain and blood in stool.   Endocrine: Negative for cold intolerance and heat intolerance.   Musculoskeletal: Negative for back pain and gait problem.   Skin: Negative for pallor and rash.   Neurological: Negative for speech difficulty and headaches.   Psychiatric/Behavioral: Negative for confusion, hallucinations and suicidal ideas.   All other systems reviewed and are negative.    Objective:     Vital Signs (Most Recent):  Temp: 98.5 °F (36.9 °C) (06/15/18 1136)  Pulse: (!) 58 (06/15/18 1136)  Resp: 18 (06/15/18 1136)  BP: 129/65 (06/15/18 1136)  SpO2: 97 % (06/15/18 1136) Vital Signs (24h Range):  Temp:  [96.8 °F (36 °C)-98.6 °F (37 °C)] 98.5 °F (36.9 °C)  Pulse:  [58-70] 58  Resp:  [16-18] 18  SpO2:  [91 %-100 %] 97 %  BP: (120-146)/(58-74) 129/65     Weight: 93.7 kg (206 lb 9.1 oz)  Body mass index is 27.25 kg/m².  No intake or output data in the 24 hours ending 06/15/18 1409   Physical Exam   Constitutional: He is oriented to person, place, and time. He appears well-developed and well-nourished. No distress.   HENT:   Nose: Nose normal.   Mouth/Throat: Oropharynx is clear and moist. No oropharyngeal exudate.   Eyes: Conjunctivae are normal. Right eye exhibits no discharge. Left eye exhibits no discharge. No scleral icterus.   Neck: No JVD present.   Cardiovascular: Normal rate, regular rhythm, normal heart sounds and intact distal pulses.  Exam reveals no friction rub.    No murmur  heard.  Pulmonary/Chest: No respiratory distress. He has no wheezes. He has no rales (L sided rales with increased WOB).   Abdominal: Soft. Bowel sounds are normal. He exhibits no distension. There is no tenderness.   Musculoskeletal: Normal range of motion. He exhibits no edema.   Lymphadenopathy:     He has no cervical adenopathy.   Neurological: He is alert and oriented to person, place, and time. He has normal reflexes.   Skin: Skin is warm. Capillary refill takes less than 2 seconds. No rash noted. He is not diaphoretic. No erythema.   Psychiatric: He has a normal mood and affect. His behavior is normal.   Nursing note and vitals reviewed.      Significant Labs: All pertinent labs within the past 24 hours have been reviewed.    Significant Imaging: na-ct abd-left pleural consolidation

## 2018-06-15 NOTE — PROGRESS NOTES
[facility] Medicine  Progress Note    Patient Name: Micheal Hunt  MRN: [id number]  Patient Class: IP- Inpatient   Admission Date: 6/14/2018  Length of Stay: 1 days  Attending Physician: Nandini Herndon MD  Primary Care Provider: Pk Lakhani MD        Subjective:     Principal Problem:LLL pneumonia    HPI:  Patient is a 77-year-old with a past medical history significant for CAD status post coronary artery bypass graft, hypertension, dyslipidemia, and stage 5 CKD who presents the hospital with fever, chills, malaise and cough for the past 3-5 days.  Noted sick contacts in the house the patient's grandchildren.  Patient denies any secondary symptoms such as chest pain but does state he is have some chest discomfort associated with cough.  Patient has had multiple admissions in hospitals in the past secondary to severe abdominal pain with an essentially negative workup.  Patient was is seen and evaluated in the emergency department and found to have evidence of left lower lobe pneumonia on CT abdomen.  Patient's CURB -65 score is 2, indicating inpatient treatment.  Patient was also found to be in acute kidney injury and his plan of care was discussed in detail with Nephrology.    Hospital Course:  No notes on file    Interval History: pt seen/examined-  No sob, cough brownish phlegm has improved  No cp    Review of Systems   Constitutional: Positive for activity change, appetite change and fatigue. Negative for fever.   HENT: Negative for ear discharge, facial swelling and sore throat.    Eyes: Negative for photophobia, pain and visual disturbance.   Respiratory: Positive for cough. Negative for apnea and shortness of breath.    Cardiovascular: Negative for chest pain and leg swelling.   Gastrointestinal: Negative for abdominal pain and blood in stool.   Endocrine: Negative for cold intolerance and heat intolerance.   Musculoskeletal: Negative for back pain and gait problem.    Skin: Negative for pallor and rash.   Neurological: Negative for speech difficulty and headaches.   Psychiatric/Behavioral: Negative for confusion, hallucinations and suicidal ideas.   All other systems reviewed and are negative.    Objective:     Vital Signs (Most Recent):  Temp: 98.5 °F (36.9 °C) (06/15/18 1136)  Pulse: (!) 58 (06/15/18 1136)  Resp: 18 (06/15/18 1136)  BP: 129/65 (06/15/18 1136)  SpO2: 97 % (06/15/18 1136) Vital Signs (24h Range):  Temp:  [96.8 °F (36 °C)-98.6 °F (37 °C)] 98.5 °F (36.9 °C)  Pulse:  [58-70] 58  Resp:  [16-18] 18  SpO2:  [91 %-100 %] 97 %  BP: (120-146)/(58-74) 129/65     Weight: 93.7 kg (206 lb 9.1 oz)  Body mass index is 27.25 kg/m².  No intake or output data in the 24 hours ending 06/15/18 1409   Physical Exam   Constitutional: He is oriented to person, place, and time. He appears well-developed and well-nourished. No distress.   HENT:   Nose: Nose normal.   Mouth/Throat: Oropharynx is clear and moist. No oropharyngeal exudate.   Eyes: Conjunctivae are normal. Right eye exhibits no discharge. Left eye exhibits no discharge. No scleral icterus.   Neck: No JVD present.   Cardiovascular: Normal rate, regular rhythm, normal heart sounds and intact distal pulses.  Exam reveals no friction rub.    No murmur heard.  Pulmonary/Chest: No respiratory distress. He has no wheezes. He has no rales (L sided rales with increased WOB).   Abdominal: Soft. Bowel sounds are normal. He exhibits no distension. There is no tenderness.   Musculoskeletal: Normal range of motion. He exhibits no edema.   Lymphadenopathy:     He has no cervical adenopathy.   Neurological: He is alert and oriented to person, place, and time. He has normal reflexes.   Skin: Skin is warm. Capillary refill takes less than 2 seconds. No rash noted. He is not diaphoretic. No erythema.   Psychiatric: He has a normal mood and affect. His behavior is normal.   Nursing note and vitals reviewed.      Significant Labs: All pertinent  labs within the past 24 hours have been reviewed.    Significant Imaging: na-ct abd-left pleural consolidation     Assessment/Plan:      * LLL pneumonia    Patient with lobar pneumonia noted in the left lower lobe.    Will start treatment with IV antibiotics specifically for lobar pneumonia, Rocephin 1 g IV 24.  Continue cough suppressants, and supplemental O2 for O2 sats greater than 92%.  If patient is doing well likely be able to go home in the next 1-2 days.          Generalized abdominal pain    Patient with intermittent episodes of abdominal pain with an essentially negative workup.  Etiology likely is related to mesenteric ischemia given the patient's history and risk factors.  Will start patient on low-dose nitrate and monitor.          Chronic kidney disease, stage 5    Treatment per above with acute kidney injury.  Chronic.            Thrombocytopenia    Chronic problem, controlled. Will monitor.            Anemia due to stage 4 chronic kidney disease    Patient's anemia is currently controlled. S/p 0 units of PRBCs. Etiology likely d/t CKD stage 5  Current CBC reviewed-   Lab Results   Component Value Date    HGB 8.6 (L) 06/15/2018    HCT 25.9 (L) 06/15/2018     Monitor serial CBC and transfuse if patient becomes hemodynamically unstable, symptomatic or H/H drops below 7/21.             NICK (acute kidney injury)    Patient with acute kidney injury likely secondary to dehydration.--Improving     Will continue  gentle IV fluids and consult with Nephrology.    Patient's baseline GFR is approximately 20.  He is nonoliguric currently.  No acute need for dialysis.  Defer further evaluation and management to Nephrology.          Diastolic dysfunction    CHF currently controlled. Latest ECHO shows ejection fraction of   EF   Date Value Ref Range Status   06/06/2018 47  Final     Nuc Stress EF   Date Value Ref Range Status   06/06/2018 47 % Final     Nuc Rest EF   Date Value Ref Range Status   06/06/2018 41  Final    Continue BB and hold ARB and Lasix secondary to NICK.  Monitor clinical status closely. Monitor on telemetry. Last BNP is     Recent Labs  Lab 06/14/18  1151   *   . Continue to stress to patient importance of self efficacy and  on diet for CHF.            CAD (coronary artery disease), 2V CABG 2007    Patient with known CAD s/p CABG, which is controlled Will continue ASA and monitor for S/Sx of angina/ACS. Continue to monitor on telemetry.             Hyperlipidemia, baseline      Patient is chronically on statin. Will continue for now. Monitor clinically. Last LDL was   Lab Results   Component Value Date    LDLCALC 47.6 (L) 04/25/2018                Benign essential HTN    Chronic, controlled.  Monitor BP closely.  Will continue BP medications as needed for sustained BP control.  Hypertension Medications             amLODIPine (NORVASC) 10 MG tablet TAKE 1 TABLET BY MOUTH BEFORE DINNER.    carvedilol (COREG) 6.25 MG tablet TAKE 1 TABLET (6.25 MG TOTAL) BY MOUTH 2 (TWO) TIMES DAILY WITH MEALS.    NITROSTAT 0.4 mg SL tablet PLACE 1 TABLET (0.4 MG TOTAL) UNDER THE TONGUE EVERY 5 (FIVE) MINUTES AS NEEDED FOR CHEST PAIN.    valsartan (DIOVAN) 320 MG tablet Take 1 tablet (320 mg total) by mouth once daily.      Hospital Medications             amLODIPine tablet 10 mg Take 2 tablets (10 mg total) by mouth every evening.    carvedilol tablet 6.25 mg Take 1 tablet (6.25 mg total) by mouth 2 (two) times daily with meals.    isosorbide mononitrate 24 hr tablet 30 mg Take 1 tablet (30 mg total) by mouth once daily.    amLODIPine tablet 10 mg (Discontinued) Take 2 tablets (10 mg total) by mouth once daily.                    VTE Risk Mitigation         Ordered     enoxaparin injection 30 mg  Daily      06/14/18 1510     IP VTE HIGH RISK PATIENT  Once      06/14/18 1510      Pt doing well-likely dc home am -and transitioned to po abx.   Continue to monitor renal function         Nandini Herndon,  MD  Department of Hospital Medicine   [facility]

## 2018-06-15 NOTE — PLAN OF CARE
06/15/18 0734   Patient Assessment/Suction   Level of Consciousness (AVPU) alert   All Lung Fields Breath Sounds clear   PRE-TX-O2-ETCO2   O2 Device (Oxygen Therapy) room air   SpO2 96 %   Pulse Oximetry Type Intermittent   $ Pulse Oximetry - Multiple Charge Pulse Oximetry - Multiple   Pulse 67   Resp 16   Aerosol Therapy   $ Aerosol Therapy Charges PRN treatment not required   Inhaler   $ Inhaler Charges Refused      WDL

## 2018-06-15 NOTE — PLAN OF CARE
Problem: Patient Care Overview  Goal: Plan of Care Review  Medications reviewed and given  IV antibiotics  Wheels low/bed locked  Call light within reach  Safety maintained

## 2018-06-15 NOTE — ASSESSMENT & PLAN NOTE
Patient's anemia is currently controlled. S/p 0 units of PRBCs. Etiology likely d/t CKD stage 5  Current CBC reviewed-   Lab Results   Component Value Date    HGB 8.6 (L) 06/15/2018    HCT 25.9 (L) 06/15/2018     Monitor serial CBC and transfuse if patient becomes hemodynamically unstable, symptomatic or H/H drops below 7/21.

## 2018-06-15 NOTE — PLAN OF CARE
PCP is Dr Lakhani.  Verified insurance with medicare and [facility].  Pharmacy is [facility] in [place].  Patient lives alone in a home on [place] acres, and his daughter Tonya also has a home on the same property.  Daughter Tonya is POA.  Denies HH.  Discharge plan is home; no needs.       06/15/18 1443   Discharge Assessment   Assessment Type Discharge Planning Assessment   Confirmed/corrected address and phone number on facesheet? Yes   Assessment information obtained from? Patient   Prior to hospitilization cognitive status: Alert/Oriented   Prior to hospitalization functional status: Independent   Current cognitive status: Alert/Oriented   Current Functional Status: Independent   Lives With alone   Able to Return to Prior Arrangements yes   Is patient able to care for self after discharge? Yes   Patient's perception of discharge disposition home or selfcare   Readmission Within The Last 30 Days no previous admission in last 30 days   Patient currently being followed by outpatient case management? No   Patient currently receives any other outside agency services? No   Equipment Currently Used at Home nebulizer;cane, straight   Do you have any problems affording any of your prescribed medications? No   Is the patient taking medications as prescribed? yes   Does the patient have transportation home? Yes   Transportation Available family or friend will provide   Does the patient receive services at the [facility]? No   Discharge Plan A Home   Patient/Family In Agreement With Plan yes

## 2018-06-15 NOTE — CONSULTS
INPATIENT NEPHROLOGY CONSULT   [facility] NEPHROLOGY    Patient Name: Micheal Hunt  Date: 06/15/2018    Reason for consultation: NICK    Chief Complaint:   Chief Complaint   Patient presents with    Sore Throat     chills, body aches since last pm.       History of Present Illness:  76 y/o M with hx of CAD s/p CABG, HTN, HLD and stage V CKD (Cr 4s, eGFR 15) who p/w fevers/chills x 4-5 days. He endorses cough, n/v and diarrhea. He has had sick grandkids at home. He denies any exac/reliev factors. His admit imaging shows LLL PNA. He is on IV antbx. We have been consulted for NICK.    Plan of Care:    Assessment:  Sepsis 2/2 PNA  AoCKI, baseline stage V CKD  HTN  Hyponatremia  SHPT  Anemia of CKD  BPH    Plan:    1. Acute Kidney Injury- Suspect prerenal NICK in the setting of infection. He received gentle hydration. He is nonoliguric (says he filled up an entire urinal). He denies uremic symptoms. There is no evidence of volume overload. Would monitor another 24 hours to see if renal function levels off/improves. See no acute indication for RRT. No NSAIDs or IV contrast. Need strict measurement of UOP.     2. Volume/Blood Pressure- Parameters are stable.     3. Electrolytes/Acid Base- There are no major active issues (hyponatremia is mild- may be exacerbated by pulm process).     4. Bone Mineral Metabolism- Continue calcitriol.     5. Anemia of CKD- + Hb drop- may be dilutional 2/2 IVFs- will trend.     6. BPH- Continue flomax.     7. Medications- Dose for CrCl < 10.     Thank you for allowing us to participate in this patient's care. We will continue to follow.    Vital Signs:  Temp Readings from Last 3 Encounters:   06/15/18 97 °F (36.1 °C)   05/03/18 97.5 °F (36.4 °C)   04/27/18 98.4 °F (36.9 °C) (Oral)       Pulse Readings from Last 3 Encounters:   06/15/18 67   06/06/18 63   06/06/18 60       BP Readings from Last 3 Encounters:   06/15/18 (!) 120/58   06/06/18 129/66   06/06/18 (!) 140/102       Weight:  Wt  Readings from Last 3 Encounters:   06/14/18 93.7 kg (206 lb 9.1 oz)   06/06/18 99.8 kg (220 lb)   05/10/18 100.1 kg (220 lb 10.9 oz)       Past Medical & Surgical History:  Past Medical History:   Diagnosis Date    Allergy     Arthritis     Gout    BPH (benign prostatic hyperplasia)     CAD (coronary artery disease) 2006    Chronic kidney disease     due to ibuprofen    Colon polyp     Diverticulosis     Gastritis     GERD (gastroesophageal reflux disease)     HTN (hypertension) 3/27/2012    Hyperlipidemia     LVH (left ventricular hypertrophy)     Mesenteric ischemia     Murmur, cardiac 3/27/2012    GRICELDA (obstructive sleep apnea)     DOES NOT USE A MACHINE    Sinus problem     Syncope and collapse        Past Surgical History:   Procedure Laterality Date    APPENDECTOMY      CARDIAC CATHETERIZATION      COLONOSCOPY  2011    COLONOSCOPY N/A 5/3/2018    Procedure: COLONOSCOPY;  Surgeon: Messi Harris MD;  Location: [facility];  Service: Endoscopy;  Laterality: N/A;    CORONARY ARTERY BYPASS GRAFT  4/2007    x 1    JOINT REPLACEMENT      left knee total replacement  X 3    mid leftt finger      from a cactuss    MOLE REMOVAL  2016    rotative cuff      no rotative cuffs on bilat shoulders has pins     SHOULDER SURGERY      shoulder surgery bilat  RIGHT X 4; LEFT X 3       Past Social History:  Social History     Social History    Marital status:      Spouse name: N/A    Number of children: N/A    Years of education: N/A     Social History Main Topics    Smoking status: Never Smoker    Smokeless tobacco: Never Used    Alcohol use No    Drug use: No    Sexual activity: Not Asked     Other Topics Concern    None     Social History Narrative    None       Medications:  No current facility-administered medications on file prior to encounter.      Current Outpatient Prescriptions on File Prior to Encounter   Medication Sig Dispense Refill    albuterol 90 mcg/actuation inhaler  2 puffs every 4 hours as needed for cough, wheeze, or shortness of breath 3 Inhaler 3    albuterol-ipratropium 2.5mg-0.5mg/3mL (DUO-NEB) 0.5 mg-3 mg(2.5 mg base)/3 mL nebulizer solution INHALE 1 VIAL BY NEBULIZER EVERY 6 HOURS AS NEEDED FOR WHEEZING 270 mL 0    allopurinol (ZYLOPRIM) 300 MG tablet Take 1 tablet (300 mg total) by mouth once daily. 90 tablet 3    amLODIPine (NORVASC) 10 MG tablet TAKE 1 TABLET BY MOUTH BEFORE DINNER. 90 tablet 3    aspirin (ECOTRIN) 81 MG EC tablet Take 81 mg by mouth once daily.        atorvastatin (LIPITOR) 80 MG tablet TAKE 1 TABLET (80 MG TOTAL) BY MOUTH ONCE DAILY. 90 tablet 3    budesonide-formoterol 160-4.5 mcg (SYMBICORT) 160-4.5 mcg/actuation HFAA Inhale 2 puffs into the lungs every 12 (twelve) hours. Controller 3 Inhaler 3    calcitRIOL (ROCALTROL) 0.25 MCG Cap       calcium-vitamin D 500-125 mg-unit tablet Take 1 tablet by mouth as needed.       carvedilol (COREG) 6.25 MG tablet TAKE 1 TABLET (6.25 MG TOTAL) BY MOUTH 2 (TWO) TIMES DAILY WITH MEALS. 90 tablet 3    cyanocobalamin, vitamin B-12, (VITAMIN B-12) 1,000 mcg Subl Take 1 tablet by mouth daily as needed. Takes 3,000mcg      finasteride (PROSCAR) 5 mg tablet TAKE 1 TABLET ONCE DAILY. 90 tablet 3    fluticasone (FLONASE) 50 mcg/actuation nasal spray 2 sprays (100 mcg total) by Each Nare route once daily. (Patient taking differently: 2 sprays by Each Nare route daily as needed. ) 3 Bottle 3    montelukast (SINGULAIR) 10 mg tablet Take 1 tablet (10 mg total) by mouth every evening. 90 tablet 3    NITROSTAT 0.4 mg SL tablet PLACE 1 TABLET (0.4 MG TOTAL) UNDER THE TONGUE EVERY 5 (FIVE) MINUTES AS NEEDED FOR CHEST PAIN. 25 tablet 3    tamsulosin (FLOMAX) 0.4 mg Cp24 Take 1 capsule (0.4 mg total) by mouth once daily. 30 capsule 11    traMADol (ULTRAM) 50 mg tablet Take 1 tablet (50 mg total) by mouth every 8 (eight) hours as needed (No more than 3 per day). 90 tablet 1    valsartan (DIOVAN) 320 MG tablet  "Take 1 tablet (320 mg total) by mouth once daily. 90 tablet 3    zolpidem (AMBIEN) 5 MG Tab TAKE 1 TABLET (5 MG TOTAL) BY MOUTH NIGHTLY AS NEEDED. 30 tablet 5    lubiprostone (AMITIZA) 24 MCG Cap Take 1 capsule (24 mcg total) by mouth 2 (two) times daily. 180 capsule 3    sodium chloride (SALINE NASAL MIST) 3 % Mist 1 spray by Nasal route 2 (two) times daily.       Scheduled Meds:   allopurinol  300 mg Oral QHS    amLODIPine  10 mg Oral QHS    aspirin  81 mg Oral Daily    atorvastatin  80 mg Oral QHS    carvedilol  6.25 mg Oral BID WM    cefTRIAXone (ROCEPHIN) IVPB  1 g Intravenous Q24H    enoxaparin  30 mg Subcutaneous Daily    finasteride  5 mg Oral QHS    fluticasone  2 spray Each Nare Daily    fluticasone-vilanterol  1 puff Inhalation Daily    isosorbide mononitrate  30 mg Oral Daily    montelukast  10 mg Oral QHS    senna-docusate 8.6-50 mg  1 tablet Oral BID    tamsulosin  0.4 mg Oral Daily     Continuous Infusions:  PRN Meds:.acetaminophen, albuterol-ipratropium, dextromethorphan-guaifenesin  mg/5 ml, dextrose 50%, dextrose 50%, glucagon (human recombinant), glucose, glucose, magnesium oxide, magnesium oxide, ondansetron, potassium chloride 10%, ramelteon, sodium chloride 0.9%    Allergies:  Ace inhibitors; Arb-angiotensin receptor antagonist; Eplerenone; and Sulfa (sulfonamide antibiotics)    Past Family History:  Reviewed; refer to Hospitalist Admission Note    Review of Systems:  Review of Systems - All 14 systems reviewed and negative, except as noted in HPI    Physical Exam:  BP (!) 120/58   Pulse 67   Temp 97 °F (36.1 °C)   Resp 16   Ht 6' 1" (1.854 m)   Wt 93.7 kg (206 lb 9.1 oz)   SpO2 96%   BMI 27.25 kg/m²     INS/OUTS:  No intake/output data recorded.  No intake/output data recorded.    General Appearance:    NAD, AAO x 3, cooperative, appears stated age   Head:    Normocephalic, atraumatic   Eyes:    PER, EOMI, and conjunctiva/sclera clear bilaterally        Throat:   " Moist mucus membranes, no thrush or oral lesions, normal      dentition   Back:     No CVA tenderness   Lungs:     Clear to auscultation bilaterally anteriorly, no wheeze, crackles, rales or rhonchi, symmetric air movement, respirations unlabored   Heart:    Regular rate and rhythm, S1 and S2 normal, no murmur, rub   or gallop   Abdomen:     Soft, non-tender, non-distended, bowel sounds active all four   quadrants, no RT or guarding, no masses, no organomegaly   Extremities:   Warm and well perfused, distal pulses intact, no cyanosis or    peripheral edema   MSK:   No joint or muscle swelling, tenderness or deformity   Skin:   Skin color, texture, turgor normal, no rashes or lesions   Neurologic/Psychiatric:   CNII-XII intact, normal strength and sensation       throughout, no asterixis; normal affect, memory, judgement     and insight     Results:  Lab Results   Component Value Date     (L) 06/15/2018    K 4.5 06/15/2018     06/15/2018    CO2 27 06/15/2018    BUN 50 (H) 06/15/2018    CREATININE 6.2 (H) 06/15/2018    CALCIUM 9.7 06/15/2018    ANIONGAP 6 (L) 06/15/2018    ESTGFRAFRICA 9 (A) 06/15/2018    EGFRNONAA 8 (A) 06/15/2018       Lab Results   Component Value Date    CALCIUM 9.7 06/15/2018    PHOS 4.8 (H) 06/15/2018         Recent Labs  Lab 06/15/18  0448   WBC 12.10   RBC 2.77*   HGB 8.6*   HCT 25.9*   *   MCV 93   MCH 31.1*   MCHC 33.3       I have personally reviewed pertinent radiological imaging and reports.    Ángela Arcos MD MPH  [facility]  [address] [place] [address] [place] [address]  [phone] (p)  [phone] (f)

## 2018-06-15 NOTE — PT/OT/SLP DISCHARGE
Occupational Therapy Discharge Summary    Micheal Hunt  MRN: [id number]   Principal Problem: LLL pneumonia      Patient Discharged from acute Occupational Therapy on 6/15/18.      Assessment:     Patient reports being independent prior to admission.     Objective:     Reasons for Discontinuation of Therapy Services  PT evaluated and discharged  secondary to independent mobility and gait.  Pt has no need for skilled OT services.  Plan:     Patient Discharged to: In house with no therapy needed    CAREY Bonds  6/15/2018     I certify that I was present in the room directing the student in service delivery and guiding them using my skilled judgment. As the co-signing therapist I have reviewed the students documentation and am responsible for the treatment, assessment, and plan.   DIANE Chávez

## 2018-06-16 VITALS
BODY MASS INDEX: 27.41 KG/M2 | RESPIRATION RATE: 17 BRPM | HEART RATE: 58 BPM | OXYGEN SATURATION: 92 % | TEMPERATURE: 99 F | DIASTOLIC BLOOD PRESSURE: 60 MMHG | WEIGHT: 206.81 LBS | SYSTOLIC BLOOD PRESSURE: 123 MMHG | HEIGHT: 73 IN

## 2018-06-16 LAB
ALBUMIN SERPL BCP-MCNC: 3.2 G/DL
ALP SERPL-CCNC: 54 U/L
ALT SERPL W/O P-5'-P-CCNC: 8 U/L
ANION GAP SERPL CALC-SCNC: 9 MMOL/L
AST SERPL-CCNC: 8 U/L
BACTERIA THROAT CULT: NORMAL
BASOPHILS # BLD AUTO: 0 K/UL
BASOPHILS NFR BLD: 0.3 %
BILIRUB SERPL-MCNC: 0.4 MG/DL
BUN SERPL-MCNC: 50 MG/DL
CALCIUM SERPL-MCNC: 9.7 MG/DL
CHLORIDE SERPL-SCNC: 104 MMOL/L
CO2 SERPL-SCNC: 24 MMOL/L
CREAT SERPL-MCNC: 5.9 MG/DL
DIFFERENTIAL METHOD: ABNORMAL
EOSINOPHIL # BLD AUTO: 0.4 K/UL
EOSINOPHIL NFR BLD: 3.5 %
ERYTHROCYTE [DISTWIDTH] IN BLOOD BY AUTOMATED COUNT: 13.9 %
EST. GFR  (AFRICAN AMERICAN): 10 ML/MIN/1.73 M^2
EST. GFR  (NON AFRICAN AMERICAN): 8 ML/MIN/1.73 M^2
GLUCOSE SERPL-MCNC: 92 MG/DL
HCT VFR BLD AUTO: 25.7 %
HGB BLD-MCNC: 8.6 G/DL
LYMPHOCYTES # BLD AUTO: 2.1 K/UL
LYMPHOCYTES NFR BLD: 20.2 %
MAGNESIUM SERPL-MCNC: 2.3 MG/DL
MCH RBC QN AUTO: 31.3 PG
MCHC RBC AUTO-ENTMCNC: 33.6 G/DL
MCV RBC AUTO: 93 FL
MONOCYTES # BLD AUTO: 0.7 K/UL
MONOCYTES NFR BLD: 6.4 %
NEUTROPHILS # BLD AUTO: 7.2 K/UL
NEUTROPHILS NFR BLD: 69.6 %
PHOSPHATE SERPL-MCNC: 4.5 MG/DL
PLATELET # BLD AUTO: 127 K/UL
PMV BLD AUTO: 8.8 FL
POCT GLUCOSE: 106 MG/DL (ref 70–110)
POTASSIUM SERPL-SCNC: 3.8 MMOL/L
PROT SERPL-MCNC: 6.1 G/DL
RBC # BLD AUTO: 2.75 M/UL
SODIUM SERPL-SCNC: 137 MMOL/L
WBC # BLD AUTO: 10.3 K/UL

## 2018-06-16 PROCEDURE — 80053 COMPREHEN METABOLIC PANEL: CPT | Mod: NTX

## 2018-06-16 PROCEDURE — 63600175 PHARM REV CODE 636 W HCPCS: Mod: NTX | Performed by: HOSPITALIST

## 2018-06-16 PROCEDURE — 99900035 HC TECH TIME PER 15 MIN (STAT): Mod: NTX

## 2018-06-16 PROCEDURE — 63600175 PHARM REV CODE 636 W HCPCS: Mod: NTX | Performed by: INTERNAL MEDICINE

## 2018-06-16 PROCEDURE — 83735 ASSAY OF MAGNESIUM: CPT | Mod: NTX

## 2018-06-16 PROCEDURE — 25000242 PHARM REV CODE 250 ALT 637 W/ HCPCS: Mod: NTX | Performed by: HOSPITALIST

## 2018-06-16 PROCEDURE — 85025 COMPLETE CBC W/AUTO DIFF WBC: CPT | Mod: NTX

## 2018-06-16 PROCEDURE — 84100 ASSAY OF PHOSPHORUS: CPT | Mod: NTX

## 2018-06-16 PROCEDURE — 36415 COLL VENOUS BLD VENIPUNCTURE: CPT | Mod: NTX

## 2018-06-16 PROCEDURE — 25000003 PHARM REV CODE 250: Mod: NTX | Performed by: HOSPITALIST

## 2018-06-16 PROCEDURE — 94761 N-INVAS EAR/PLS OXIMETRY MLT: CPT | Mod: NTX

## 2018-06-16 RX ORDER — CEFUROXIME AXETIL 250 MG/1
250 TABLET ORAL 2 TIMES DAILY
Qty: 14 TABLET | Refills: 0 | Status: SHIPPED | OUTPATIENT
Start: 2018-06-16 | End: 2018-06-23

## 2018-06-16 RX ADMIN — EPOETIN ALFA 10000 UNITS: 10000 SOLUTION INTRAVENOUS; SUBCUTANEOUS at 10:06

## 2018-06-16 RX ADMIN — ISOSORBIDE MONONITRATE 30 MG: 30 TABLET, EXTENDED RELEASE ORAL at 08:06

## 2018-06-16 RX ADMIN — CARVEDILOL 6.25 MG: 6.25 TABLET, FILM COATED ORAL at 08:06

## 2018-06-16 RX ADMIN — CEFTRIAXONE 1 G: 1 INJECTION, SOLUTION INTRAVENOUS at 10:06

## 2018-06-16 RX ADMIN — STANDARDIZED SENNA CONCENTRATE AND DOCUSATE SODIUM 1 TABLET: 8.6; 5 TABLET, FILM COATED ORAL at 08:06

## 2018-06-16 RX ADMIN — FLUTICASONE PROPIONATE 100 MCG: 50 SPRAY, METERED NASAL at 08:06

## 2018-06-16 RX ADMIN — ASPIRIN 81 MG: 81 TABLET, COATED ORAL at 08:06

## 2018-06-16 RX ADMIN — TAMSULOSIN HYDROCHLORIDE 0.4 MG: 0.4 CAPSULE ORAL at 08:06

## 2018-06-16 NOTE — PLAN OF CARE
Problem: Patient Care Overview  Goal: Plan of Care Review  Outcome: Ongoing (interventions implemented as appropriate)  Pt resting quietly with no signs of distress. Pt refused poct glucose during night. Pt having PVC's with trigemeny at 0400. Bed low locked, call light in reach. Will continue to monitor.

## 2018-06-16 NOTE — PROGRESS NOTES
INPATIENT NEPHROLOGY PROGRESS NOTE  [facility] NEPHROLOGY    Micheal Hunt  06/16/2018    Reason for consultation:        ckd V    Chief Complaint:   Chief Complaint   Patient presents with    Sore Throat     chills, body aches since last pm.        History of Present Illness:       76 y/o M with hx of CAD s/p CABG, HTN, HLD and stage V CKD (Cr 4s, eGFR 15) who p/w fevers/chills x 4-5 days. He endorses cough, n/v and diarrhea. He has had sick grandkids at home. He denies any exac/reliev factors. His admit imaging shows LLL PNA. He is on IV antbx. We have been consulted for NICK.    6/16  Feels good.  Wants to go home.  No sob, cough, chest pain, nausea.  Looks good.          Plan of Care:     Assessment:     ckd v -- stable.  No uremia  Pneumonia  Anemia  hypertension    Plan:     Epogen today  Continue outpt medication  Renal dose new medication for crcl 10  No non-essential nephrotoxic agents  Ok to d/c from renal stantpoint    Thank you for allowing us to participate in this patient's care. We will continue to follow.    Medications:  No current facility-administered medications on file prior to encounter.      Current Outpatient Prescriptions on File Prior to Encounter   Medication Sig Dispense Refill    albuterol 90 mcg/actuation inhaler 2 puffs every 4 hours as needed for cough, wheeze, or shortness of breath 3 Inhaler 3    albuterol-ipratropium 2.5mg-0.5mg/3mL (DUO-NEB) 0.5 mg-3 mg(2.5 mg base)/3 mL nebulizer solution INHALE 1 VIAL BY NEBULIZER EVERY 6 HOURS AS NEEDED FOR WHEEZING 270 mL 0    allopurinol (ZYLOPRIM) 300 MG tablet Take 1 tablet (300 mg total) by mouth once daily. 90 tablet 3    amLODIPine (NORVASC) 10 MG tablet TAKE 1 TABLET BY MOUTH BEFORE DINNER. 90 tablet 3    aspirin (ECOTRIN) 81 MG EC tablet Take 81 mg by mouth once daily.        atorvastatin (LIPITOR) 80 MG tablet TAKE 1 TABLET (80 MG TOTAL) BY MOUTH ONCE DAILY. 90 tablet 3    budesonide-formoterol 160-4.5 mcg (SYMBICORT)  160-4.5 mcg/actuation HFAA Inhale 2 puffs into the lungs every 12 (twelve) hours. Controller 3 Inhaler 3    calcitRIOL (ROCALTROL) 0.25 MCG Cap       calcium-vitamin D 500-125 mg-unit tablet Take 1 tablet by mouth as needed.       carvedilol (COREG) 6.25 MG tablet TAKE 1 TABLET (6.25 MG TOTAL) BY MOUTH 2 (TWO) TIMES DAILY WITH MEALS. 90 tablet 3    cyanocobalamin, vitamin B-12, (VITAMIN B-12) 1,000 mcg Subl Take 1 tablet by mouth daily as needed. Takes 3,000mcg      finasteride (PROSCAR) 5 mg tablet TAKE 1 TABLET ONCE DAILY. 90 tablet 3    fluticasone (FLONASE) 50 mcg/actuation nasal spray 2 sprays (100 mcg total) by Each Nare route once daily. (Patient taking differently: 2 sprays by Each Nare route daily as needed. ) 3 Bottle 3    montelukast (SINGULAIR) 10 mg tablet Take 1 tablet (10 mg total) by mouth every evening. 90 tablet 3    NITROSTAT 0.4 mg SL tablet PLACE 1 TABLET (0.4 MG TOTAL) UNDER THE TONGUE EVERY 5 (FIVE) MINUTES AS NEEDED FOR CHEST PAIN. 25 tablet 3    tamsulosin (FLOMAX) 0.4 mg Cp24 Take 1 capsule (0.4 mg total) by mouth once daily. 30 capsule 11    traMADol (ULTRAM) 50 mg tablet Take 1 tablet (50 mg total) by mouth every 8 (eight) hours as needed (No more than 3 per day). 90 tablet 1    valsartan (DIOVAN) 320 MG tablet Take 1 tablet (320 mg total) by mouth once daily. 90 tablet 3    zolpidem (AMBIEN) 5 MG Tab TAKE 1 TABLET (5 MG TOTAL) BY MOUTH NIGHTLY AS NEEDED. 30 tablet 5    lubiprostone (AMITIZA) 24 MCG Cap Take 1 capsule (24 mcg total) by mouth 2 (two) times daily. 180 capsule 3    sodium chloride (SALINE NASAL MIST) 3 % Mist 1 spray by Nasal route 2 (two) times daily.       Scheduled Meds:   allopurinol  300 mg Oral QHS    amLODIPine  10 mg Oral QHS    aspirin  81 mg Oral Daily    atorvastatin  80 mg Oral QHS    carvedilol  6.25 mg Oral BID WM    cefTRIAXone (ROCEPHIN) IVPB  1 g Intravenous Q24H    enoxaparin  30 mg Subcutaneous Daily    epoetin kitty (PROCRIT)  injection  10,000 Units Subcutaneous Once    finasteride  5 mg Oral QHS    fluticasone  2 spray Each Nare Daily    fluticasone-vilanterol  1 puff Inhalation Daily    isosorbide mononitrate  30 mg Oral Daily    montelukast  10 mg Oral QHS    senna-docusate 8.6-50 mg  1 tablet Oral BID    tamsulosin  0.4 mg Oral Daily     Continuous Infusions:  PRN Meds:.acetaminophen, albuterol-ipratropium, dextromethorphan-guaifenesin  mg/5 ml, dextrose 50%, dextrose 50%, glucagon (human recombinant), glucose, glucose, magnesium oxide, magnesium oxide, ondansetron, potassium chloride 10%, ramelteon, sodium chloride 0.9%    Allergies:  Ace inhibitors; Arb-angiotensin receptor antagonist; Eplerenone; and Sulfa (sulfonamide antibiotics)    Vital Signs:  Temp Readings from Last 3 Encounters:   06/16/18 98.7 °F (37.1 °C)   05/03/18 97.5 °F (36.4 °C)   04/27/18 98.4 °F (36.9 °C) (Oral)       Pulse Readings from Last 3 Encounters:   06/16/18 62   06/06/18 63   06/06/18 60       BP Readings from Last 3 Encounters:   06/16/18 (!) 142/69   06/06/18 129/66   06/06/18 (!) 140/102       Weight:  Wt Readings from Last 3 Encounters:   06/16/18 93.8 kg (206 lb 12.7 oz)   06/06/18 99.8 kg (220 lb)   05/10/18 100.1 kg (220 lb 10.9 oz)       Review of Systems:  Review of Systems - All 14 systems reviewed and negative, except as noted in HPI    Physical Exam:    Constitutional: NAD  Neuro: No asterixis  Psych: Calm  EENT: NCAT, anicteric sclera   Neck:  supple  Cards: No rub  Lungs: clear to ausculation  GI:  Pos bowel sounds, no hsm, NTND   MSK:  WNWD  Skin: no purpura, rashes  Access: avf    Results:  Lab Results   Component Value Date     06/16/2018    K 3.8 06/16/2018     06/16/2018    CO2 24 06/16/2018    BUN 50 (H) 06/16/2018    CREATININE 5.9 (H) 06/16/2018    CALCIUM 9.7 06/16/2018    ANIONGAP 9 06/16/2018    ESTGFRAFRICA 10 (A) 06/16/2018    EGFRNONAA 8 (A) 06/16/2018       Lab Results   Component Value Date    CALCIUM  9.7 06/16/2018    PHOS 4.5 06/16/2018         Recent Labs  Lab 06/16/18  0435   WBC 10.30   RBC 2.75*   HGB 8.6*   HCT 25.7*   *   MCV 93   MCH 31.3*   MCHC 33.6       I have personally reviewed pertinent radiological imaging and reports.     Frankie Rojo MD  Nephrology  [facility]  [phone]

## 2018-06-16 NOTE — HOSPITAL COURSE
Patient admitted with LLL pna-likely CAP as exposed to grandchildren who were ill and treated with rocephin/inhalers and nebulizer treatments. His sputum production improved and he was not hypoxic so he is transitioned to oral abx, continue symbicort and prn nebulizer treatments.   He had chente on ckd which was treated with ivf and his renal function returned to baseline and urine output remained more than adequate  So he will continue his routine medications and remain in close fu with Dr Rojo.   Alert Nad, lungs decreased breath sounds left base. Cor rrr, abd-soft; ext -no edema

## 2018-06-16 NOTE — PROGRESS NOTES
06/15/18 2050   Patient Assessment/Suction   Level of Consciousness (AVPU) alert   Respiratory Effort Normal;Unlabored   All Lung Fields Breath Sounds clear   PRE-TX-O2-ETCO2   O2 Device (Oxygen Therapy) room air   SpO2 96 %   Pulse Oximetry Type Intermittent   Pulse 68   Resp 18   Aerosol Therapy   $ Aerosol Therapy Charges PRN treatment not required   Respiratory Treatment Status PRN treatment not required

## 2018-06-16 NOTE — PLAN OF CARE
06/16/18 0747   Patient Assessment/Suction   Level of Consciousness (AVPU) alert   All Lung Fields Breath Sounds clear   PRE-TX-O2-ETCO2   O2 Device (Oxygen Therapy) room air   SpO2 98 %   Pulse Oximetry Type Intermittent   $ Pulse Oximetry - Multiple Charge Pulse Oximetry - Multiple   Pulse 68   Resp 18   Temp (!) 60.8 °F (16 °C)   Aerosol Therapy   $ Aerosol Therapy Charges PRN treatment not required   Inhaler   $ Inhaler Charges Refused

## 2018-06-16 NOTE — PLAN OF CARE
06/16/18 1413   Final Note   Assessment Type Final Discharge Note   Discharge Disposition Home

## 2018-06-16 NOTE — DISCHARGE SUMMARY
[facility] Medicine  Discharge Summary      Patient Name: Micheal Hunt  MRN: [id number]  Admission Date: 6/14/2018  Hospital Length of Stay: 2 days  Discharge Date and Time: No discharge date for patient encounter.  Attending Physician: Nandini Herndon MD   Discharging Provider: Nandini Herndon MD  Primary Care Provider: Pk Lakhani MD      HPI:   Patient is a 77-year-old with a past medical history significant for CAD status post coronary artery bypass graft, hypertension, dyslipidemia, and stage 5 CKD who presents the hospital with fever, chills, malaise and cough for the past 3-5 days.  Noted sick contacts in the house the patient's grandchildren.  Patient denies any secondary symptoms such as chest pain but does state he is have some chest discomfort associated with cough.  Patient has had multiple admissions in hospitals in the past secondary to severe abdominal pain with an essentially negative workup.  Patient was is seen and evaluated in the emergency department and found to have evidence of left lower lobe pneumonia on CT abdomen.  Patient's CURB -65 score is 2, indicating inpatient treatment.  Patient was also found to be in acute kidney injury and his plan of care was discussed in detail with Nephrology.    * No surgery found *      Hospital Course:   Patient admitted with LLL pna-likely CAP as exposed to grandchildren who were ill and treated with rocephin/inhalers and nebulizer treatments. His sputum production improved and he was not hypoxic so he is transitioned to oral abx, continue symbicort and prn nebulizer treatments.   He had chente on ckd which was treated with ivf and his renal function returned to baseline and urine output remained more than adequate  So he will continue his routine medications and remain in close fu with Dr Rojo.   Alert Nad, lungs decreased breath sounds left base. Cor rrr, abd-soft; ext -no edema            Consults:   Consults          Status Ordering Provider     Inpatient consult to Nephrology  Once     Provider:  Frankie Rojo MD    Acknowledged RAGHAV ESTEBAN     Inpatient consult to Nephrology  Once     Provider:  Frankie Rojo MD    Completed ERVIN COLINDRES          No new Assessment & Plan notes have been filed under this hospital service since the last note was generated.  Service: Hospital Medicine    Final Active Diagnoses:    Diagnosis Date Noted POA    PRINCIPAL PROBLEM:  LLL pneumonia [J18.1] 06/14/2018 Yes    Chronic kidney disease, stage 5 [N18.5] 06/14/2018 Yes    Generalized abdominal pain [R10.84] 06/14/2018 Yes    Thrombocytopenia [D69.6] 02/25/2018 Yes    Anemia due to stage 4 chronic kidney disease [N18.4, D63.1] 02/25/2018 Yes    NICK (acute kidney injury) [N17.9] 07/29/2016 Yes    Diastolic dysfunction [I51.9] 12/10/2013 Yes    CAD (coronary artery disease), 2V CABG 2007 [I25.10]  Yes    Benign essential HTN [I10] 03/27/2012 Yes    Hyperlipidemia, baseline  [E78.5] 03/27/2012 Yes      Problems Resolved During this Admission:    Diagnosis Date Noted Date Resolved POA       Discharged Condition: good    Disposition: Home or Self Care    Follow Up:  Follow-up Information     Pk Lakhani MD In 1 week.    Specialty:  Family Medicine  Contact information:  [address] [facility] [address] [phone]             Frankie Rojo MD In 1 week.    Specialty:  Nephrology  Contact information:  [address] [facility] [place]  [place] [id number] [phone]                 Patient Instructions:     Activity as tolerated     Notify your health care provider if you experience any of the following:  temperature >100.4     Notify your health care provider if you experience any of the following:  difficulty breathing or increased cough     Notify your health care provider if you experience any of the following:  increased confusion or weakness         Significant Diagnostic Studies:       Pending  Diagnostic Studies:     None         Medications:  Reconciled Home Medications:      Medication List      START taking these medications    cefUROXime 250 MG tablet  Commonly known as:  CEFTIN  Take 1 tablet (250 mg total) by mouth 2 (two) times daily.        CHANGE how you take these medications    fluticasone 50 mcg/actuation nasal spray  Commonly known as:  FLONASE  2 sprays (100 mcg total) by Each Nare route once daily.  What changed:  · when to take this  · reasons to take this        CONTINUE taking these medications    albuterol 90 mcg/actuation inhaler  2 puffs every 4 hours as needed for cough, wheeze, or shortness of breath     albuterol-ipratropium 2.5 mg-0.5 mg/3 mL nebulizer solution  Commonly known as:  DUO-NEB  INHALE 1 VIAL BY NEBULIZER EVERY 6 HOURS AS NEEDED FOR WHEEZING     allopurinol 300 MG tablet  Commonly known as:  ZYLOPRIM  Take 1 tablet (300 mg total) by mouth once daily.     amLODIPine 10 MG tablet  Commonly known as:  NORVASC  TAKE 1 TABLET BY MOUTH BEFORE DINNER.     aspirin 81 MG EC tablet  Commonly known as:  ECOTRIN  Take 81 mg by mouth once daily.     atorvastatin 80 MG tablet  Commonly known as:  LIPITOR  TAKE 1 TABLET (80 MG TOTAL) BY MOUTH ONCE DAILY.     budesonide-formoterol 160-4.5 mcg 160-4.5 mcg/actuation Hfaa  Commonly known as:  SYMBICORT  Inhale 2 puffs into the lungs every 12 (twelve) hours. Controller     calcitRIOL 0.25 MCG Cap  Commonly known as:  ROCALTROL     calcium-vitamin D 500-125 mg-unit tablet  Take 1 tablet by mouth as needed.     carvedilol 6.25 MG tablet  Commonly known as:  COREG  TAKE 1 TABLET (6.25 MG TOTAL) BY MOUTH 2 (TWO) TIMES DAILY WITH MEALS.     finasteride 5 mg tablet  Commonly known as:  PROSCAR  TAKE 1 TABLET ONCE DAILY.     lubiprostone 24 MCG Cap  Commonly known as:  AMITIZA  Take 1 capsule (24 mcg total) by mouth 2 (two) times daily.     montelukast 10 mg tablet  Commonly known as:  SINGULAIR  Take 1 tablet (10 mg total) by mouth every  evening.     NITROSTAT 0.4 MG SL tablet  Generic drug:  nitroGLYCERIN  PLACE 1 TABLET (0.4 MG TOTAL) UNDER THE TONGUE EVERY 5 (FIVE) MINUTES AS NEEDED FOR CHEST PAIN.     sodium chloride 3 % Mist  Commonly known as:  SALINE NASAL MIST  1 spray by Nasal route 2 (two) times daily.     tamsulosin 0.4 mg Cp24  Commonly known as:  FLOMAX  Take 1 capsule (0.4 mg total) by mouth once daily.     traMADol 50 mg tablet  Commonly known as:  ULTRAM  Take 1 tablet (50 mg total) by mouth every 8 (eight) hours as needed (No more than 3 per day).     valsartan 320 MG tablet  Commonly known as:  DIOVAN  Take 1 tablet (320 mg total) by mouth once daily.     VITAMIN B-12 1,000 mcg Subl  Generic drug:  cyanocobalamin (vitamin B-12)  Take 1 tablet by mouth daily as needed. Takes 3,000mcg     zolpidem 5 MG Tab  Commonly known as:  AMBIEN  TAKE 1 TABLET (5 MG TOTAL) BY MOUTH NIGHTLY AS NEEDED.            Indwelling Lines/Drains at time of discharge:   Lines/Drains/Airways          No matching active lines, drains, or airways          Time spent on the discharge of patient: 32 minutes  Patient was seen and examined on the date of discharge and determined to be suitable for discharge.         Nandini Herndon MD  Department of Hospital Medicine  [facility]

## 2018-06-17 ENCOUNTER — PATIENT MESSAGE (OUTPATIENT)
Dept: CARDIOLOGY | Facility: CLINIC | Age: 78
End: 2018-06-17

## 2018-06-17 ENCOUNTER — PATIENT MESSAGE (OUTPATIENT)
Dept: FAMILY MEDICINE | Facility: CLINIC | Age: 78
End: 2018-06-17

## 2018-06-18 ENCOUNTER — TELEPHONE (OUTPATIENT)
Dept: MEDSURG UNIT | Facility: HOSPITAL | Age: 78
End: 2018-06-18

## 2018-06-18 NOTE — TELEPHONE ENCOUNTER
He needs an appointment with us to see what kind of dizziness he is having so we can decide what type of specialist is needed if any.  Please note he was just discharged from hospital so really he needs a hospital follow up.

## 2018-06-19 LAB
BACTERIA BLD CULT: NORMAL
BACTERIA BLD CULT: NORMAL

## 2018-06-20 ENCOUNTER — LAB VISIT (OUTPATIENT)
Dept: LAB | Facility: HOSPITAL | Age: 78
End: 2018-06-20
Attending: PHYSICIAN ASSISTANT
Payer: MEDICARE

## 2018-06-20 ENCOUNTER — TELEPHONE (OUTPATIENT)
Dept: HEMATOLOGY/ONCOLOGY | Facility: CLINIC | Age: 78
End: 2018-06-20

## 2018-06-20 ENCOUNTER — OFFICE VISIT (OUTPATIENT)
Dept: FAMILY MEDICINE | Facility: CLINIC | Age: 78
End: 2018-06-20
Payer: MEDICARE

## 2018-06-20 VITALS
HEIGHT: 73 IN | SYSTOLIC BLOOD PRESSURE: 120 MMHG | TEMPERATURE: 98 F | OXYGEN SATURATION: 98 % | DIASTOLIC BLOOD PRESSURE: 71 MMHG | WEIGHT: 208.75 LBS | HEART RATE: 67 BPM | BODY MASS INDEX: 27.67 KG/M2

## 2018-06-20 DIAGNOSIS — D64.9 ANEMIA, UNSPECIFIED TYPE: ICD-10-CM

## 2018-06-20 DIAGNOSIS — N18.5 CHRONIC KIDNEY DISEASE, STAGE 5: ICD-10-CM

## 2018-06-20 DIAGNOSIS — N18.30 CHRONIC KIDNEY DISEASE, STAGE III (MODERATE): ICD-10-CM

## 2018-06-20 DIAGNOSIS — I10 ESSENTIAL HYPERTENSION, MALIGNANT: ICD-10-CM

## 2018-06-20 DIAGNOSIS — M10.9 GOUT, UNSPECIFIED: ICD-10-CM

## 2018-06-20 DIAGNOSIS — I25.810 CORONARY ATHEROSCLEROSIS OF AUTOLOGOUS VEIN BYPASS GRAFT: Primary | ICD-10-CM

## 2018-06-20 DIAGNOSIS — M19.90 SENILE ARTHRITIS: ICD-10-CM

## 2018-06-20 DIAGNOSIS — R53.83 FATIGUE: ICD-10-CM

## 2018-06-20 DIAGNOSIS — H81.4 VERTIGO OF CENTRAL ORIGIN, UNSPECIFIED LATERALITY: ICD-10-CM

## 2018-06-20 DIAGNOSIS — R42 VERTIGO: ICD-10-CM

## 2018-06-20 DIAGNOSIS — N18.4 CHRONIC KIDNEY DISEASE, STAGE IV (SEVERE): ICD-10-CM

## 2018-06-20 DIAGNOSIS — J18.9 PNEUMONIA OF LEFT LOWER LOBE DUE TO INFECTIOUS ORGANISM: Primary | ICD-10-CM

## 2018-06-20 LAB
ALBUMIN SERPL BCP-MCNC: 3.8 G/DL
ALP SERPL-CCNC: 74 U/L
ALT SERPL W/O P-5'-P-CCNC: 12 U/L
ANION GAP SERPL CALC-SCNC: 9 MMOL/L
AST SERPL-CCNC: 18 U/L
BASOPHILS # BLD AUTO: 0.04 K/UL
BASOPHILS NFR BLD: 0.5 %
BILIRUB SERPL-MCNC: 0.4 MG/DL
BUN SERPL-MCNC: 47 MG/DL
CALCIUM SERPL-MCNC: 11.5 MG/DL
CHLORIDE SERPL-SCNC: 101 MMOL/L
CO2 SERPL-SCNC: 26 MMOL/L
CREAT SERPL-MCNC: 5.9 MG/DL
DIFFERENTIAL METHOD: ABNORMAL
EOSINOPHIL # BLD AUTO: 0.5 K/UL
EOSINOPHIL NFR BLD: 5.8 %
ERYTHROCYTE [DISTWIDTH] IN BLOOD BY AUTOMATED COUNT: 13.2 %
EST. GFR  (AFRICAN AMERICAN): 9.7 ML/MIN/1.73 M^2
EST. GFR  (NON AFRICAN AMERICAN): 8.4 ML/MIN/1.73 M^2
GLUCOSE SERPL-MCNC: 100 MG/DL
HCT VFR BLD AUTO: 29.7 %
HGB BLD-MCNC: 9.8 G/DL
IMM GRANULOCYTES # BLD AUTO: 0.03 K/UL
IMM GRANULOCYTES NFR BLD AUTO: 0.4 %
LYMPHOCYTES # BLD AUTO: 2.1 K/UL
LYMPHOCYTES NFR BLD: 26.6 %
MCH RBC QN AUTO: 31.2 PG
MCHC RBC AUTO-ENTMCNC: 33 G/DL
MCV RBC AUTO: 95 FL
MONOCYTES # BLD AUTO: 0.5 K/UL
MONOCYTES NFR BLD: 6.5 %
NEUTROPHILS # BLD AUTO: 4.7 K/UL
NEUTROPHILS NFR BLD: 60.2 %
NRBC BLD-RTO: 0 /100 WBC
PLATELET # BLD AUTO: 193 K/UL
PMV BLD AUTO: 10.3 FL
POTASSIUM SERPL-SCNC: 4.2 MMOL/L
PROT SERPL-MCNC: 7.3 G/DL
RBC # BLD AUTO: 3.14 M/UL
SODIUM SERPL-SCNC: 136 MMOL/L
WBC # BLD AUTO: 7.75 K/UL

## 2018-06-20 PROCEDURE — 80053 COMPREHEN METABOLIC PANEL: CPT | Mod: NTX

## 2018-06-20 PROCEDURE — 99495 TRANSJ CARE MGMT MOD F2F 14D: CPT | Mod: PBBFAC,PO | Performed by: PHYSICIAN ASSISTANT

## 2018-06-20 PROCEDURE — 99215 OFFICE O/P EST HI 40 MIN: CPT | Mod: PBBFAC,PO | Performed by: PHYSICIAN ASSISTANT

## 2018-06-20 PROCEDURE — 99495 TRANSJ CARE MGMT MOD F2F 14D: CPT | Mod: S$PBB,,, | Performed by: PHYSICIAN ASSISTANT

## 2018-06-20 PROCEDURE — 99999 PR PBB SHADOW E&M-EST. PATIENT-LVL V: CPT | Mod: PBBFAC,,, | Performed by: PHYSICIAN ASSISTANT

## 2018-06-20 PROCEDURE — 36415 COLL VENOUS BLD VENIPUNCTURE: CPT | Mod: PO,TXP

## 2018-06-20 PROCEDURE — 85025 COMPLETE CBC W/AUTO DIFF WBC: CPT | Mod: TXP

## 2018-06-20 RX ORDER — VALSARTAN 160 MG/1
160 TABLET ORAL DAILY
Refills: 3 | COMMUNITY
Start: 2018-05-12 | End: 2018-06-20

## 2018-06-20 RX ORDER — MECLIZINE HYDROCHLORIDE 25 MG/1
25 TABLET ORAL 3 TIMES DAILY PRN
Qty: 90 TABLET | Refills: 1 | Status: SHIPPED | OUTPATIENT
Start: 2018-06-20 | End: 2018-07-05 | Stop reason: ALTCHOICE

## 2018-06-20 RX ORDER — CARVEDILOL 12.5 MG/1
12.5 TABLET ORAL 2 TIMES DAILY
Refills: 3 | COMMUNITY
Start: 2018-05-24 | End: 2018-06-20 | Stop reason: SDUPTHER

## 2018-06-20 RX ORDER — COLCHICINE 0.6 MG/1
TABLET, FILM COATED ORAL
Refills: 11 | COMMUNITY
Start: 2018-05-22 | End: 2018-10-21

## 2018-06-20 RX ORDER — GABAPENTIN 100 MG/1
100 CAPSULE ORAL NIGHTLY
Qty: 90 CAPSULE | Refills: 0 | Status: SHIPPED | OUTPATIENT
Start: 2018-06-20 | End: 2018-08-16 | Stop reason: SDUPTHER

## 2018-06-20 RX ORDER — CALCITRIOL 0.5 UG/1
1 CAPSULE ORAL DAILY
Refills: 3 | COMMUNITY
Start: 2018-05-18 | End: 2018-07-05

## 2018-06-20 NOTE — TELEPHONE ENCOUNTER
----- Message from Maya Fortune sent at 6/20/2018  8:08 AM CDT -----  Please contact patient to schedule first available appointment  Dr Wells has in the [place] office. Patient needs to be followed up for anemia. NANCY Chauhan has already put in a referral.    Thanks    Viktoria Chauhan's Staff

## 2018-06-20 NOTE — PROGRESS NOTES
Subjective:       Patient ID: Micheal Hunt is a 77 y.o. male.    Chief Complaint: [facility] hospital follow up    Transitional Care Note  Admit date: 06/14/18  Discharge date: 06/16/18  Date of interactive contact (2 business days post D/C): 06/18/18  Hospitalized at: [facility]  Discharge diagnoses: LLL PNA    Family and/or Caretaker present at visit?  No.  Diagnostic tests reviewed/disposition: I have reviewed all completed as well as pending diagnostic tests at the time of discharge.  Disease/illness education: Patient advised to complete all prescribed medication and avoid contact with sick family members      Home health/community services discussion/referrals: Patient does not have home health established from hospital visit.  They do not need home health.  If needed, we will set up home health for the patient.   Establishment or re-establishment of referral orders for community resources: No other necessary community resources.   Discussion with other health care providers: No discussion with other health care providers necessary.   Medication changes:start Ceftin 250mg BID.  Continue all other home medications.     Mr. Hunt comes to clinic today for hospital follow up. The patient was admitted to [facility] on 06/14/18 after being found to have LLL PNA after having fever, malaise, chills, and cough for 5 days prior. The paitent had contact with sick grandchildren. The patient was also found to have NICK. The patient was admitted and started on IV antibiotics and fluid. The patient's sputum production and hypoxia improved so the patient was changed to oral antibiotics. The patient was also to continue on symbicort and as needed nebulizer treatments. The patient's kidney function improved to baseline with adequate urine output. The patient was discharged on oral antibiotics. The patient's blood work did reveal anemia. The patient complains today of persistent vertigo that has caused  significant frustration. He reports this has been a problem for years. The patient reports minimal relief with meclizine. He has been evaluated by neurology in the past. PT was recommended and valium was prescribed. The patient never took the valium. The patient also requests a refill of gabapentin; he reports this was helping him with his diabetic neuropathy.         Review of Systems   Constitutional: Negative for activity change, appetite change and fever.   HENT: Negative for postnasal drip, rhinorrhea and sinus pressure.    Eyes: Negative for visual disturbance.   Respiratory: Negative for cough and shortness of breath.    Cardiovascular: Negative for chest pain.   Gastrointestinal: Negative for abdominal distention and abdominal pain.   Genitourinary: Negative for difficulty urinating and dysuria.   Musculoskeletal: Negative for arthralgias and myalgias.   Neurological: Positive for dizziness. Negative for headaches.   Hematological: Negative for adenopathy.   Psychiatric/Behavioral: The patient is not nervous/anxious.        Objective:      Physical Exam   Constitutional: He is oriented to person, place, and time.   HENT:   Mouth/Throat: Oropharynx is clear and moist. No oropharyngeal exudate.   Eyes: Conjunctivae are normal. Pupils are equal, round, and reactive to light.   Cardiovascular: Normal rate and regular rhythm.    Pulmonary/Chest: Effort normal and breath sounds normal. He has no wheezes.   Abdominal: Soft. Bowel sounds are normal. There is no tenderness.   Musculoskeletal: He exhibits no edema.   Lymphadenopathy:     He has no cervical adenopathy.   Neurological: He is alert and oriented to person, place, and time.   Skin: No erythema.   Psychiatric: His behavior is normal.       Assessment:       1. Pneumonia of left lower lobe due to infectious organism    2. Chronic kidney disease, stage 5    3. Anemia, unspecified type    4. Vertigo    5. Vertigo of central origin, unspecified laterality          Plan:   Micheal was seen today for [facility] follow up.    Diagnoses and all orders for this visit:    Pneumonia of left lower lobe due to infectious organism  -     X-Ray Chest PA And Lateral; Future  Continue and complete antibiotics  Repeat CXR in 3 weeks  Chronic kidney disease, stage 5  -     CBC auto differential; Future  -     Comprehensive metabolic panel; Future    Anemia, unspecified type  -     CBC auto differential; Future  -     Ambulatory referral to Hematology / Oncology    Vertigo  -     Ambulatory referral to ENT  -     Ambulatory Referral to Audiology  -     meclizine (ANTIVERT) 25 mg tablet; Take 1 tablet (25 mg total) by mouth 3 (three) times daily as needed.    Vertigo of central origin, unspecified laterality   -     Ambulatory Referral to Audiology    Other orders  -     gabapentin (NEURONTIN) 100 MG capsule; Take 1 capsule (100 mg total) by mouth every evening.

## 2018-06-20 NOTE — TELEPHONE ENCOUNTER
Spoke with pt. Scheduled appointment from referral. Gave location to clinic. Pt verbalized understanding.

## 2018-06-25 ENCOUNTER — TELEPHONE (OUTPATIENT)
Dept: FAMILY MEDICINE | Facility: CLINIC | Age: 78
End: 2018-06-25

## 2018-06-25 RX ORDER — ALLOPURINOL 300 MG/1
TABLET ORAL
Qty: 90 TABLET | Refills: 3 | OUTPATIENT
Start: 2018-06-25

## 2018-06-25 RX ORDER — ALLOPURINOL 100 MG/1
100 TABLET ORAL DAILY
Qty: 90 TABLET | Refills: 1 | Status: SHIPPED | OUTPATIENT
Start: 2018-06-25 | End: 2019-02-04 | Stop reason: SDUPTHER

## 2018-06-25 NOTE — TELEPHONE ENCOUNTER
----- Message from ROSALBA Calderon-TAMI sent at 6/21/2018  7:42 AM CDT -----  Anemia is improved.  Kidney function is about the same.  Have you followed up with Nephrology (Dr. Rojo) since your discharge?  If not, please do this ASAP.

## 2018-06-25 NOTE — TELEPHONE ENCOUNTER
With patient's current kidney function, He should not be taking allopurinol at 300mg dose. This will need to be reduced to 100mg. New Rx sent.

## 2018-06-25 NOTE — TELEPHONE ENCOUNTER
Spoke with patient regarding his results, patient stated he has an appointment available to see Dr. Rojo on 7/1/18, but he will call the office on today. I will send a copy of results to patient so he can have it for his visit.

## 2018-06-28 ENCOUNTER — OFFICE VISIT (OUTPATIENT)
Dept: OTOLARYNGOLOGY | Facility: CLINIC | Age: 78
End: 2018-06-28
Payer: MEDICARE

## 2018-06-28 ENCOUNTER — CLINICAL SUPPORT (OUTPATIENT)
Dept: AUDIOLOGY | Facility: CLINIC | Age: 78
End: 2018-06-28
Payer: MEDICARE

## 2018-06-28 VITALS — WEIGHT: 208.75 LBS | BODY MASS INDEX: 27.67 KG/M2 | HEIGHT: 73 IN

## 2018-06-28 DIAGNOSIS — R42 DIZZINESS: ICD-10-CM

## 2018-06-28 DIAGNOSIS — R42 DIZZINESS: Primary | ICD-10-CM

## 2018-06-28 DIAGNOSIS — H90.3 SENSORINEURAL HEARING LOSS (SNHL) OF BOTH EARS: Primary | ICD-10-CM

## 2018-06-28 DIAGNOSIS — H93.292 IMPAIRED AUDITORY DISCRIMINATION, LEFT: ICD-10-CM

## 2018-06-28 DIAGNOSIS — H90.3 BILATERAL SENSORINEURAL HEARING LOSS: Primary | ICD-10-CM

## 2018-06-28 PROCEDURE — 99999 PR PBB SHADOW E&M-EST. PATIENT-LVL I: ICD-10-PCS | Mod: PBBFAC,,,

## 2018-06-28 PROCEDURE — 92567 TYMPANOMETRY: CPT | Mod: PBBFAC,PO,TXP | Performed by: AUDIOLOGIST

## 2018-06-28 PROCEDURE — 99999 PR PBB SHADOW E&M-EST. PATIENT-LVL III: CPT | Mod: PBBFAC,TXP,, | Performed by: NURSE PRACTITIONER

## 2018-06-28 PROCEDURE — 99213 OFFICE O/P EST LOW 20 MIN: CPT | Mod: PBBFAC,27,PO,TXP | Performed by: NURSE PRACTITIONER

## 2018-06-28 PROCEDURE — 92542 POSITIONAL NYSTAGMUS TEST: CPT | Mod: 26,S$PBB,, | Performed by: AUDIOLOGIST

## 2018-06-28 PROCEDURE — 92542 PR POSITIONAL NYSTAGMUS TEST: ICD-10-PCS | Mod: 26,S$PBB,, | Performed by: AUDIOLOGIST

## 2018-06-28 PROCEDURE — 99999 PR PBB SHADOW E&M-EST. PATIENT-LVL I: CPT | Mod: PBBFAC,TXP,,

## 2018-06-28 PROCEDURE — 99999 PR PBB SHADOW E&M-EST. PATIENT-LVL I: CPT | Mod: PBBFAC,,,

## 2018-06-28 PROCEDURE — 92542 POSITIONAL NYSTAGMUS TEST: CPT | Mod: PBBFAC,PO | Performed by: AUDIOLOGIST

## 2018-06-28 PROCEDURE — 99211 OFF/OP EST MAY X REQ PHY/QHP: CPT | Mod: PBBFAC,PO,25,NTX

## 2018-06-28 PROCEDURE — 92557 COMPREHENSIVE HEARING TEST: CPT | Mod: PBBFAC,PO,TXP | Performed by: AUDIOLOGIST

## 2018-06-28 PROCEDURE — 99211 OFF/OP EST MAY X REQ PHY/QHP: CPT | Mod: PBBFAC,25,27,PO

## 2018-06-28 PROCEDURE — 99203 OFFICE O/P NEW LOW 30 MIN: CPT | Mod: S$PBB,TXP,, | Performed by: NURSE PRACTITIONER

## 2018-06-28 NOTE — LETTER
June 28, 2018      Viktoria Chauhan PA-C  [address] [facility] [address]           [facility]  [address] [facility] [address] [place] [address]  Phone: [phone]  Fax: [phone]          Patient: Micheal Hunt   MR Number: [id number]   YOB: 1940   Date of Visit: 6/28/2018       Dear Viktoria Chauhan:    Thank you for referring Micheal Hunt to me for evaluation. Attached you will find relevant portions of my assessment and plan of care.    If you have questions, please do not hesitate to call me. I look forward to following Micheal Hunt along with you.    Sincerely,    Brooke Chahal, NP    Enclosure  CC:  No Recipients    If you would like to receive this communication electronically, please contact [email or web address] or [phone] to request more information on [employer] Link access.    For providers and/or their staff who would like to refer a patient to [facility], please contact us through our one-stop-shop provider referral line, [facility], at [phone].    If you feel you have received this communication in error or would no longer like to receive these types of communications, please e-mail [email or web address]

## 2018-06-28 NOTE — PROGRESS NOTES
Subjective:       Patient ID: Micheal Hunt is a 77 y.o. male.    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 He states when he has to look up, particularly while he is in open spaces, he feels head spinning. This has been occurring for decades. He fell off a second story nearly 30 years ago, possible loss of consciousness (not sure), was dizzy for six months thereafter. He recalls having several tests done but no definitive answer for his dizziness, eventually improved. He reports dizzy ongoing for past 6 years, started off just a little bit in the morning, but progressively worsened. Usually worse upon getting out of bed, has to sit on side of bed leaning forward, head toward knees for a few minutes before arising d/t dizziness. Described as general lightheadedness with some vertigo upon quick position changes and straight looking up. Some nausea in the beginning, but none in a while. No aural fullness. No tinnitus. No change in hearing (other than gradual decline).     Review of Systems   Constitutional: Negative.    HENT: Positive for hearing loss. Negative for tinnitus.    Eyes: Negative.    Respiratory: Negative.    Cardiovascular: Negative.    Gastrointestinal: Negative.    Musculoskeletal: Negative.    Skin: Negative.    Neurological: Positive for dizziness and light-headedness.   Hematological: Negative.    Psychiatric/Behavioral: Negative.        Objective:      Physical Exam   Constitutional: He is oriented to person, place, and time. Vital signs are normal. He appears well-developed and well-nourished. He is cooperative. He does not appear ill. No distress.   HENT:   Head: Normocephalic and atraumatic.   Right Ear: Hearing, tympanic membrane, external ear and ear canal normal. Tympanic membrane is not erythematous. No middle ear effusion.   Left Ear: Hearing, tympanic membrane, external ear and ear canal normal. Tympanic membrane is not erythematous.  No  middle ear effusion.   Nose: Nose normal. No mucosal edema or rhinorrhea. Right sinus exhibits no maxillary sinus tenderness and no frontal sinus tenderness. Left sinus exhibits no maxillary sinus tenderness and no frontal sinus tenderness.   Mouth/Throat: Uvula is midline, oropharynx is clear and moist and mucous membranes are normal. Mucous membranes are not pale, not dry and not cyanotic. No oral lesions. No oropharyngeal exudate, posterior oropharyngeal edema or posterior oropharyngeal erythema.   Eyes: Conjunctivae, EOM and lids are normal. Pupils are equal, round, and reactive to light. Right eye exhibits no discharge. Left eye exhibits no discharge. No scleral icterus.   Neck: Trachea normal and normal range of motion. Neck supple. No tracheal deviation present. No thyroid mass and no thyromegaly present.   Cardiovascular: Normal rate.    Pulmonary/Chest: Effort normal. No stridor. No respiratory distress. He has no wheezes.   Musculoskeletal: Normal range of motion.   Lymphadenopathy:        Head (right side): No submental, no submandibular, no tonsillar, no preauricular and no posterior auricular adenopathy present.        Head (left side): No submental, no submandibular, no tonsillar, no preauricular and no posterior auricular adenopathy present.     He has no cervical adenopathy.        Right cervical: No superficial cervical and no posterior cervical adenopathy present.       Left cervical: No superficial cervical and no posterior cervical adenopathy present.   Neurological: He is alert and oriented to person, place, and time. He has normal strength. Coordination and gait normal.   Skin: Skin is warm, dry and intact. No lesion and no rash noted. He is not diaphoretic. No cyanosis. No pallor.   Psychiatric: He has a normal mood and affect. His speech is normal and behavior is normal. Judgment and thought content normal. Cognition and memory are normal.   Nursing note and vitals reviewed.    Negative  [place]    Assessment:     Symmetric sloping SNHL    Dysequilibrium  Plan:     Schedule VNG/CDP for comprehensive vestibular system diagnostic testing.   Patient should return to ENT 2 days after test to review results together in office.     PATIENT IS MEDICALLY CLEARED FOR HEARING AIDS.   Today's audiogram reveals the patient is a candidate for amplification. Audiogram is reviewed in detail with the patient. The audiologist's recommendation that the patient have amplification/hearing aids is discussed and questions answered. Patient has been given information by the audiologist on how to schedule a hearing aid consultation. Patient is encouraged to wear ear protection in loud noise and return annually for hearing test. Return to clinic as needed for further ENT concerns.

## 2018-06-29 ENCOUNTER — LAB VISIT (OUTPATIENT)
Dept: LAB | Facility: HOSPITAL | Age: 78
End: 2018-06-29
Attending: INTERNAL MEDICINE
Payer: MEDICARE

## 2018-06-29 ENCOUNTER — INITIAL CONSULT (OUTPATIENT)
Dept: HEMATOLOGY/ONCOLOGY | Facility: CLINIC | Age: 78
End: 2018-06-29
Payer: MEDICARE

## 2018-06-29 VITALS
TEMPERATURE: 98 F | RESPIRATION RATE: 20 BRPM | WEIGHT: 209.19 LBS | HEART RATE: 63 BPM | BODY MASS INDEX: 27.73 KG/M2 | HEIGHT: 73 IN | SYSTOLIC BLOOD PRESSURE: 130 MMHG | DIASTOLIC BLOOD PRESSURE: 63 MMHG

## 2018-06-29 DIAGNOSIS — E83.52 HYPERCALCEMIA: ICD-10-CM

## 2018-06-29 DIAGNOSIS — D64.9 ANEMIA, NORMOCYTIC NORMOCHROMIC: ICD-10-CM

## 2018-06-29 DIAGNOSIS — G60.3 IDIOPATHIC PROGRESSIVE NEUROPATHY: ICD-10-CM

## 2018-06-29 DIAGNOSIS — N19 RENAL FAILURE, UNSPECIFIED CHRONICITY: ICD-10-CM

## 2018-06-29 DIAGNOSIS — R42 DIZZINESS: ICD-10-CM

## 2018-06-29 DIAGNOSIS — R53.83 FATIGUE, UNSPECIFIED TYPE: ICD-10-CM

## 2018-06-29 DIAGNOSIS — D64.9 ANEMIA, NORMOCYTIC NORMOCHROMIC: Primary | ICD-10-CM

## 2018-06-29 DIAGNOSIS — Z86.010 HISTORY OF COLON POLYPS: ICD-10-CM

## 2018-06-29 LAB
BLD GP AB SCN CELLS X3 SERPL QL: NORMAL
FOLATE SERPL-MCNC: 10.7 NG/ML
HAPTOGLOB SERPL-MCNC: 26 MG/DL
IGA SERPL-MCNC: 233 MG/DL
IGG SERPL-MCNC: 1298 MG/DL
IGM SERPL-MCNC: 25 MG/DL
IRON SERPL-MCNC: 58 UG/DL
LDH SERPL L TO P-CCNC: 147 U/L
RETICS/RBC NFR AUTO: 0.7 %
SATURATED IRON: 16 %
TOTAL IRON BINDING CAPACITY: 358 UG/DL
TRANSFERRIN SERPL-MCNC: 242 MG/DL

## 2018-06-29 PROCEDURE — 83010 ASSAY OF HAPTOGLOBIN QUANT: CPT | Mod: NTX

## 2018-06-29 PROCEDURE — 83615 LACTATE (LD) (LDH) ENZYME: CPT | Mod: NTX

## 2018-06-29 PROCEDURE — 86334 IMMUNOFIX E-PHORESIS SERUM: CPT | Mod: TXP

## 2018-06-29 PROCEDURE — 99205 OFFICE O/P NEW HI 60 MIN: CPT | Mod: S$PBB,,, | Performed by: INTERNAL MEDICINE

## 2018-06-29 PROCEDURE — 99999 PR PBB SHADOW E&M-EST. PATIENT-LVL III: CPT | Mod: PBBFAC,,, | Performed by: INTERNAL MEDICINE

## 2018-06-29 PROCEDURE — 83520 IMMUNOASSAY QUANT NOS NONAB: CPT | Mod: 59,NTX

## 2018-06-29 PROCEDURE — 83921 ORGANIC ACID SINGLE QUANT: CPT | Mod: TXP

## 2018-06-29 PROCEDURE — 84165 PROTEIN E-PHORESIS SERUM: CPT | Mod: NTX

## 2018-06-29 PROCEDURE — 85045 AUTOMATED RETICULOCYTE COUNT: CPT | Mod: NTX

## 2018-06-29 PROCEDURE — 86334 IMMUNOFIX E-PHORESIS SERUM: CPT | Mod: 26,NTX,, | Performed by: PATHOLOGY

## 2018-06-29 PROCEDURE — 82955 ASSAY OF G6PD ENZYME: CPT | Mod: TXP

## 2018-06-29 PROCEDURE — 99213 OFFICE O/P EST LOW 20 MIN: CPT | Mod: PBBFAC,PO | Performed by: INTERNAL MEDICINE

## 2018-06-29 PROCEDURE — 82232 ASSAY OF BETA-2 PROTEIN: CPT | Mod: TXP

## 2018-06-29 PROCEDURE — 82746 ASSAY OF FOLIC ACID SERUM: CPT | Mod: NTX

## 2018-06-29 PROCEDURE — 36415 COLL VENOUS BLD VENIPUNCTURE: CPT | Mod: TXP

## 2018-06-29 PROCEDURE — 82784 ASSAY IGA/IGD/IGG/IGM EACH: CPT | Mod: TXP

## 2018-06-29 PROCEDURE — 83540 ASSAY OF IRON: CPT | Mod: 91,NTX

## 2018-06-29 PROCEDURE — 86850 RBC ANTIBODY SCREEN: CPT | Mod: NTX

## 2018-06-29 PROCEDURE — 84165 PROTEIN E-PHORESIS SERUM: CPT | Mod: 26,NTX,, | Performed by: PATHOLOGY

## 2018-06-29 NOTE — LETTER
June 29, 2018      Viktoria Chauhan PA-C  [address] [facility] [address]           [facility]  [address], [address]  [facility] [address]  Phone: [phone]          Patient: Micheal Hunt   MR Number: [id number]   YOB: 1940   Date of Visit: 6/29/2018       Dear Viktoria Chauhan:    Thank you for referring Micheal Hunt to me for evaluation. Attached you will find relevant portions of my assessment and plan of care.    If you have questions, please do not hesitate to call me. I look forward to following Micheal Hunt along with you.    Sincerely,    Mikala Wells MD    Enclosure  CC:  No Recipients    If you would like to receive this communication electronically, please contact [email or web address] or [phone] to request more information on [employer] Link access.    For providers and/or their staff who would like to refer a patient to [facility], please contact us through our one-stop-shop provider referral line, [facility], at [phone].    If you feel you have received this communication in error or would no longer like to receive these types of communications, please e-mail [email or web address]

## 2018-06-29 NOTE — PROGRESS NOTES
CC : I feel bad     Micheal Hunt is a 77 y.o.  Pt is here for evaluation of anemia. He states he has been told he was low on blood in the past. He is extremely tired and dizzy. He reports that he was hospitalized in June for acute  kidney failure after taking a certain medicine but he is not on dialysis.   He has had a recent LLL pneumonia and recovered completely from such   Past Medical History:   Diagnosis Date    Allergy     Arthritis     Gout    BPH (benign prostatic hyperplasia)     CAD (coronary artery disease) 2006    Chronic kidney disease     due to ibuprofen    Colon polyp     Diverticulosis     Gastritis     GERD (gastroesophageal reflux disease)     HTN (hypertension) 3/27/2012    Hyperlipidemia     LVH (left ventricular hypertrophy)     Mesenteric ischemia     Murmur, cardiac 3/27/2012    GRICELDA (obstructive sleep apnea)     DOES NOT USE A MACHINE    Sinus problem     Syncope and collapse      Past Surgical History:   Procedure Laterality Date    APPENDECTOMY      CARDIAC CATHETERIZATION      COLONOSCOPY  2011    COLONOSCOPY N/A 5/3/2018    Procedure: COLONOSCOPY;  Surgeon: Messi Harris MD;  Location: [facility];  Service: Endoscopy;  Laterality: N/A;    CORONARY ARTERY BYPASS GRAFT  4/2007    x 1    JOINT REPLACEMENT      left knee total replacement  X 3    mid leftt finger      from a cactuss    MOLE REMOVAL  2016    rotative cuff      no rotative cuffs on bilat shoulders has pins     SHOULDER SURGERY      shoulder surgery bilat  RIGHT X 4; LEFT X 3       Current Outpatient Prescriptions:     albuterol 90 mcg/actuation inhaler, 2 puffs every 4 hours as needed for cough, wheeze, or shortness of breath, Disp: 3 Inhaler, Rfl: 3    albuterol-ipratropium 2.5mg-0.5mg/3mL (DUO-NEB) 0.5 mg-3 mg(2.5 mg base)/3 mL nebulizer solution, INHALE 1 VIAL BY NEBULIZER EVERY 6 HOURS AS NEEDED FOR WHEEZING, Disp: 270 mL, Rfl: 0    allopurinol (ZYLOPRIM) 100 MG tablet, Take 1  tablet (100 mg total) by mouth once daily., Disp: 90 tablet, Rfl: 1    amLODIPine (NORVASC) 10 MG tablet, TAKE 1 TABLET BY MOUTH BEFORE DINNER., Disp: 90 tablet, Rfl: 3    aspirin (ECOTRIN) 81 MG EC tablet, Take 81 mg by mouth once daily.  , Disp: , Rfl:     atorvastatin (LIPITOR) 80 MG tablet, TAKE 1 TABLET (80 MG TOTAL) BY MOUTH ONCE DAILY., Disp: 90 tablet, Rfl: 3    budesonide-formoterol 160-4.5 mcg (SYMBICORT) 160-4.5 mcg/actuation HFAA, Inhale 2 puffs into the lungs every 12 (twelve) hours. Controller, Disp: 3 Inhaler, Rfl: 3    calcitRIOL (ROCALTROL) 0.25 MCG Cap, , Disp: , Rfl:     calcitRIOL (ROCALTROL) 0.5 MCG Cap, Take 1 mcg by mouth once daily., Disp: , Rfl: 3    carvedilol (COREG) 6.25 MG tablet, TAKE 1 TABLET (6.25 MG TOTAL) BY MOUTH 2 (TWO) TIMES DAILY WITH MEALS., Disp: 90 tablet, Rfl: 3    COLCRYS 0.6 mg tablet, TAKE 1 TABLET (0.6 MG TOTAL) BY MOUTH ONCE DAILY., Disp: , Rfl: 11    cyanocobalamin, vitamin B-12, (VITAMIN B-12) 1,000 mcg Subl, Take 1 tablet by mouth daily as needed. Takes 3,000mcg, Disp: , Rfl:     finasteride (PROSCAR) 5 mg tablet, TAKE 1 TABLET ONCE DAILY., Disp: 90 tablet, Rfl: 3    fluticasone (FLONASE) 50 mcg/actuation nasal spray, 2 sprays (100 mcg total) by Each Nare route once daily. (Patient taking differently: 2 sprays by Each Nare route daily as needed. ), Disp: 3 Bottle, Rfl: 3    gabapentin (NEURONTIN) 100 MG capsule, Take 1 capsule (100 mg total) by mouth every evening., Disp: 90 capsule, Rfl: 0    meclizine (ANTIVERT) 25 mg tablet, Take 1 tablet (25 mg total) by mouth 3 (three) times daily as needed., Disp: 90 tablet, Rfl: 1    NITROSTAT 0.4 mg SL tablet, PLACE 1 TABLET (0.4 MG TOTAL) UNDER THE TONGUE EVERY 5 (FIVE) MINUTES AS NEEDED FOR CHEST PAIN., Disp: 25 tablet, Rfl: 3    sodium chloride (SALINE NASAL MIST) 3 % Mist, 1 spray by Nasal route 2 (two) times daily., Disp: , Rfl:     tamsulosin (FLOMAX) 0.4 mg Cp24, Take 1 capsule (0.4 mg total) by mouth  once daily., Disp: 30 capsule, Rfl: 11    valsartan (DIOVAN) 320 MG tablet, Take 1 tablet (320 mg total) by mouth once daily., Disp: 90 tablet, Rfl: 3    zolpidem (AMBIEN) 5 MG Tab, TAKE 1 TABLET (5 MG TOTAL) BY MOUTH NIGHTLY AS NEEDED., Disp: 30 tablet, Rfl: 5  Review of patient's allergies indicates:   Allergen Reactions    Ace inhibitors Other (See Comments)     Cough    Arb-angiotensin receptor antagonist Itching    Eplerenone Other (See Comments)     Marked bradycardia, 40, tiredness and weakness      Sulfa (sulfonamide antibiotics) Itching     Social History   Substance Use Topics    Smoking status: Never Smoker    Smokeless tobacco: Never Used    Alcohol use No     Family History   Problem Relation Age of Onset    Heart disease Mother     Sudden death Father     Cancer Father         advanced lung ca- found after 2 story fall    Stroke Sister     Pneumonia Sister          from PNA    Heart disease Sister     Hypertension Brother     Kidney cancer Neg Hx     Prostate cancer Neg Hx     Urolithiasis Neg Hx     Allergic rhinitis Neg Hx     Allergies Neg Hx     Angioedema Neg Hx     Asthma Neg Hx     Atopy Neg Hx     Eczema Neg Hx     Immunodeficiency Neg Hx     Rhinitis Neg Hx     Urticaria Neg Hx        CONSTITUTIONAL: No fevers, chills, night sweats, fatigue   SKIN: no rashes or itching  ENT: No headaches, head trauma, vision changes, or eye pain  LYMPH NODES: None noticed   CV: No chest pain, palpitations.   RESP: No dyspnea on exertion, cough, wheezing, or hemoptysis  GI: No nausea, emesis, diarrhea, constipation, melena, hematochezia, pain.   : No dysuria, hematuria, urgency, or frequency   HEME: No easy bruising, bleeding problems  PSYCHIATRIC: No depression, anxiety, psychosis, hallucinations.  NEURO: No seizures, memory loss, ++dizziness  No difficulty sleeping  MSK: No arthralgias or joint swelling         /63 (BP Location: Left arm, Patient Position: Sitting, BP  "Method: Large (Automatic))   Pulse 63   Temp 98.2 °F (36.8 °C) (Oral)   Resp 20   Ht 6' 1" (1.854 m)   Wt 94.9 kg (209 lb 3.5 oz)   BMI 27.60 kg/m²   Gen: NAD, A and O x3,   Psych: pleasant affect, normal thought process  Eyes: Pupils round and non dilated, EOM intact  Nose: Nares patent  OP clear, mucosa patent  Neck: suppple, no JVD, trachea midline  Lungs: CTAB, no wheezes, no use of accessory muscles  CV: S1S2 with RRR, No mrg  Abd: soft, NTND, + BS, No HSM, no ascites  Extr: No CCE, CLARITA  Neuro: steady gait, CNs grossly intact  Skin: intact, no lesions noted  Rheum: No joint swelling    Lab Results   Component Value Date    WBC 7.75 06/20/2018    HGB 9.8 (L) 06/20/2018    HCT 29.7 (L) 06/20/2018    MCV 95 06/20/2018     06/20/2018     CMP  Sodium   Date Value Ref Range Status   06/20/2018 136 136 - 145 mmol/L Final     Potassium   Date Value Ref Range Status   06/20/2018 4.2 3.5 - 5.1 mmol/L Final     Chloride   Date Value Ref Range Status   06/20/2018 101 95 - 110 mmol/L Final     CO2   Date Value Ref Range Status   06/20/2018 26 23 - 29 mmol/L Final     Glucose   Date Value Ref Range Status   06/20/2018 100 70 - 110 mg/dL Final     BUN, Bld   Date Value Ref Range Status   06/20/2018 47 (H) 8 - 23 mg/dL Final     Creatinine   Date Value Ref Range Status   06/20/2018 5.9 (H) 0.5 - 1.4 mg/dL Final   08/08/2013 1.5 (H) 0.5 - 1.4 mg/dL Final     Calcium   Date Value Ref Range Status   06/20/2018 11.5 (H) 8.7 - 10.5 mg/dL Final   08/08/2013 9.0 8.7 - 10.5 mg/dL Final     Total Protein   Date Value Ref Range Status   06/20/2018 7.3 6.0 - 8.4 g/dL Final     Albumin   Date Value Ref Range Status   06/20/2018 3.8 3.5 - 5.2 g/dL Final   09/12/2013 4.3 3.6 - 5.1 g/dL Final     Comment:     @ Test Performed By:  [facility]  Brian Baldwin M.D., [place],   [address] [place] [address]  [facility] #[id number]     Total Bilirubin   Date Value Ref Range Status "   06/20/2018 0.4 0.1 - 1.0 mg/dL Final     Comment:     For infants and newborns, interpretation of results should be based  on gestational age, weight and in agreement with clinical  observations.  Premature Infant recommended reference ranges:  Up to 24 hours.............<8.0 mg/dL  Up to 48 hours............<12.0 mg/dL  3-5 days..................<15.0 mg/dL  6-29 days.................<15.0 mg/dL       Alkaline Phosphatase   Date Value Ref Range Status   06/20/2018 74 55 - 135 U/L Final     AST   Date Value Ref Range Status   06/20/2018 18 10 - 40 U/L Final     ALT   Date Value Ref Range Status   06/20/2018 12 10 - 44 U/L Final     Anion Gap   Date Value Ref Range Status   06/20/2018 9 8 - 16 mmol/L Final   08/08/2013 11 5 - 15 meq/L Final     eGFR if    Date Value Ref Range Status   06/20/2018 9.7 (A) >60 mL/min/1.73 m^2 Final     eGFR if non    Date Value Ref Range Status   06/20/2018 8.4 (A) >60 mL/min/1.73 m^2 Final     Comment:     Calculation used to obtain the estimated glomerular filtration  rate (eGFR) is the CKD-EPI equation.        Exam:     CT Abdomen Pelvis  Without Contrast 06/14/2018 Abdominal pain, unspecified             RESULTS:  EXAMINATION:  CT ABDOMEN PELVIS WITHOUT CONTRAST     CLINICAL HISTORY:  Abdominal pain, unspecified;     TECHNIQUE:  Low dose axial images, sagittal and coronal reformations were obtained from the lung bases to the pubic symphysis.  Oral contrast was not administered.     COMPARISON:  02/25/2018     FINDINGS:  There is severe calcification of the aorta and abdominal visceral arteries.  Heavy calcification of the coronary arteries is also seen.  There has been a prior sternotomy.     The liver and spleen are unremarkable.  There are scattered parenchymal calcifications of the pancreas in keeping with chronic pancreatitis.  There has been a cholecystectomy.  The adrenal glands are unremarkable.     A surgical clip is present within the  stomach.  The small bowel is normal in caliber, without evidence for obstruction.  The appendix is not seen, with no right lower quadrant findings of appendicitis present.  There is moderate sigmoid predominant diverticulosis without evidence for diverticulitis.     There are small fat containing umbilical and left inguinal hernias.     The kidneys demonstrate several small cysts.  Arising in the anterior margin of the midpole of the left kidney is a 3.5 cm cyst, with a 9 mm hyperdense nodule along its margin likely reflecting a small hemorrhagic component and without significant change.     The prostate gland is significantly enlarged, protruding into the base of the urinary bladder.     There is patchy consolidation within the left lower lobe.  No pleural effusion.  Moderate degenerative change of the spine and hips is present.     IMPRESSION:      No evidence for small bowel obstruction.     Moderate left lower lobe consolidation compatible with pneumonia.     Severe atherosclerosis.  Chronic pancreatitis.  Bilateral renal cysts.  Prostatomegaly.  Diverticulosis.  Cholecystectomy.        Electronically signed by: Huang Mayer MD  Date:                                            06/14/2018  Time:                                           13:17                    Signed By:  Huang Mayer MD on 6/14/2018  1:17 PM                           orifice, and rectum were photographed.  Findings:       The perianal and digital rectal examinations were normal.       Non-bleeding internal hemorrhoids were found during retroflexion.        The hemorrhoids were large.       Multiple small and large-mouthed diverticula were found in the        sigmoid colon and descending colon.       Five sessile polyps were found in the transverse colon. The polyps        were 5 to 7 mm in size. These polyps were removed with a cold snare.        Resection and retrieval were complete.       The ascending colon, cecum, appendiceal  orifice and ileocecal valve        appeared normal.       The terminal ileum appeared normal.  Impression:          - Non-bleeding internal hemorrhoids.                       - Diverticulosis in the sigmoid colon and in the                        descending colon.                       - Five 5 to 7 mm polyps in the transverse colon,                        removed with a cold snare. Resected and retrieved.                       - The ascending colon, cecum, appendiceal orifice                        and ileocecal valve are normal.                       - The examined portion of the ileum was normal.  Recommendation:      - Patient has a contact number available for                        emergencies. The signs and symptoms of potential                        delayed complications were discussed with the                        patient. Return to normal activities tomorrow.                        Written discharge instructions were provided to the                        patient.                       - High fiber diet.                       - Continue present medications.                       - Await pathology results.                       - Repeat colonoscopy in 3 years for surveillance.                       - Discharge patient to home (ambulatory).                       - Return to my office PRN.  Procedure Code(s):       --- Professional ---       17742, Colonoscopy, flexible; with removal of tumor(s), polyp(s), or        other lesion(s) by snare technique  Diagnosis Code(s):       --- Professional ---       Z12.11, Encounter for screening for malignant neoplasm of colon       Z86.010, Personal history of colonic polyps       K64.8, Other hemorrhoids       D12.3, Benign neoplasm of transverse colon (hepatic flexure or        splenic flexure      Anemia, normocytic normochromic  -     Jasmin test, indirect, qualitative; Future; Expected date: 06/29/2018  -     Folate; Future; Expected date: 06/29/2018  -      G6PD,Quantitative; Future; Expected date: 06/29/2018  -     Haptoglobin; Future; Expected date: 06/29/2018  -     Immunofixation electrophoresis; Future; Expected date: 06/29/2018  -     Immunoglobulin free LT chains blood; Future; Expected date: 06/29/2018  -     Immunoglobulins (IgG, IgA, IgM) Quantitative; Future; Expected date: 06/29/2018  -     Iron and TIBC; Future; Expected date: 06/29/2018  -     Lactate dehydrogenase; Future; Expected date: 06/29/2018  -     Methylmalonic acid, serum; Future; Expected date: 06/29/2018  -     Protein electrophoresis, serum; Future; Expected date: 06/29/2018  -     Pyruvate kinase; Future; Expected date: 06/29/2018  -     Reticulocytes; Future; Expected date: 06/29/2018  -     Beta 2 Microglobulin, Serum; Future; Expected date: 06/29/2018    Renal failure, unspecified chronicity  -     Jasmin test, indirect, qualitative; Future; Expected date: 06/29/2018  -     Folate; Future; Expected date: 06/29/2018  -     G6PD,Quantitative; Future; Expected date: 06/29/2018  -     Haptoglobin; Future; Expected date: 06/29/2018  -     Immunofixation electrophoresis; Future; Expected date: 06/29/2018  -     Immunoglobulin free LT chains blood; Future; Expected date: 06/29/2018  -     Immunoglobulins (IgG, IgA, IgM) Quantitative; Future; Expected date: 06/29/2018  -     Iron and TIBC; Future; Expected date: 06/29/2018  -     Lactate dehydrogenase; Future; Expected date: 06/29/2018  -     Methylmalonic acid, serum; Future; Expected date: 06/29/2018  -     Protein electrophoresis, serum; Future; Expected date: 06/29/2018  -     Pyruvate kinase; Future; Expected date: 06/29/2018  -     Reticulocytes; Future; Expected date: 06/29/2018  -     Beta 2 Microglobulin, Serum; Future; Expected date: 06/29/2018    Dizziness    Fatigue, unspecified type    History of colon polyps    Hypercalcemia  -     Jasmin test, indirect, qualitative; Future; Expected date: 06/29/2018  -     Folate; Future; Expected  date: 06/29/2018  -     G6PD,Quantitative; Future; Expected date: 06/29/2018  -     Haptoglobin; Future; Expected date: 06/29/2018  -     Immunofixation electrophoresis; Future; Expected date: 06/29/2018  -     Immunoglobulin free LT chains blood; Future; Expected date: 06/29/2018  -     Immunoglobulins (IgG, IgA, IgM) Quantitative; Future; Expected date: 06/29/2018  -     Iron and TIBC; Future; Expected date: 06/29/2018  -     Lactate dehydrogenase; Future; Expected date: 06/29/2018  -     Methylmalonic acid, serum; Future; Expected date: 06/29/2018  -     Protein electrophoresis, serum; Future; Expected date: 06/29/2018  -     Pyruvate kinase; Future; Expected date: 06/29/2018  -     Reticulocytes; Future; Expected date: 06/29/2018  -     Beta 2 Microglobulin, Serum; Future; Expected date: 06/29/2018    BMI 29.0-29.9,adult    Idiopathic progressive neuropathy   -     Folate; Future; Expected date: 06/29/2018        I have explained the lengthy differential diagnosis for anemia.Labs will be ordered to evaluate for bone marrow suppression, peripheral destruction as well as nutritional deficits. An ultrasound of the abdomen may be indicated to evaluate for hepatosplenomegaly which can lead to sequestration of cells. Ultimately a bone marrow biopsy and aspirate may be in order. SPEP and UPEP will also be included to look for a possible paraproteinemia.    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 Please fell free to call me with any questions or concerns.    Warmly,  Mikala Wells MD    This note was dictated with Dragon and briefly proofread.  Please excuse any sentences that may be unclear or words misspelled

## 2018-06-30 LAB
B2 MICROGLOB SERPL-MCNC: 9.3 UG/ML
PK RBC-CCNT: 10.5 U/G HB

## 2018-07-02 LAB
ALBUMIN SERPL ELPH-MCNC: 4 G/DL
ALPHA1 GLOB SERPL ELPH-MCNC: 0.32 G/DL
ALPHA2 GLOB SERPL ELPH-MCNC: 0.57 G/DL
B-GLOBULIN SERPL ELPH-MCNC: 0.81 G/DL
G6PD RBC-CCNT: 15.6 U/G HGB (ref 7–20.5)
GAMMA GLOB SERPL ELPH-MCNC: 1.21 G/DL
INTERPRETATION SERPL IFE-IMP: NORMAL
KAPPA LC SER QL IA: 10.98 MG/DL
KAPPA LC/LAMBDA SER IA: 1.55
LAMBDA LC SER QL IA: 7.07 MG/DL
PATHOLOGIST INTERPRETATION IFE: NORMAL
PATHOLOGIST INTERPRETATION SPE: NORMAL
PROT SERPL-MCNC: 6.9 G/DL

## 2018-07-03 ENCOUNTER — DOCUMENTATION ONLY (OUTPATIENT)
Dept: FAMILY MEDICINE | Facility: CLINIC | Age: 78
End: 2018-07-03

## 2018-07-03 NOTE — PROGRESS NOTES
Pre-Visit Chart Review  For Appointment Scheduled on 07/05/2017    Health Maintenance Due   Topic Date Due    TETANUS VACCINE  [birth year]27/1958    Zoster Vaccine  09/27/1999

## 2018-07-05 ENCOUNTER — HOSPITAL ENCOUNTER (OUTPATIENT)
Dept: RADIOLOGY | Facility: CLINIC | Age: 78
Discharge: HOME | End: 2018-07-05
Attending: PHYSICIAN ASSISTANT
Payer: MEDICARE

## 2018-07-05 ENCOUNTER — OFFICE VISIT (OUTPATIENT)
Dept: FAMILY MEDICINE | Facility: CLINIC | Age: 78
End: 2018-07-05
Payer: MEDICARE

## 2018-07-05 VITALS
OXYGEN SATURATION: 99 % | BODY MASS INDEX: 27.82 KG/M2 | WEIGHT: 209.88 LBS | HEART RATE: 62 BPM | DIASTOLIC BLOOD PRESSURE: 65 MMHG | TEMPERATURE: 98 F | HEIGHT: 73 IN | SYSTOLIC BLOOD PRESSURE: 121 MMHG

## 2018-07-05 DIAGNOSIS — I50.812 CHRONIC RIGHT-SIDED HEART FAILURE: ICD-10-CM

## 2018-07-05 DIAGNOSIS — I10 ESSENTIAL HYPERTENSION: ICD-10-CM

## 2018-07-05 DIAGNOSIS — J18.9 PNEUMONIA OF LEFT LOWER LOBE DUE TO INFECTIOUS ORGANISM: ICD-10-CM

## 2018-07-05 DIAGNOSIS — Z23 NEED FOR TETANUS BOOSTER: ICD-10-CM

## 2018-07-05 DIAGNOSIS — R94.39 ABNORMAL CARDIOVASCULAR STRESS TEST: ICD-10-CM

## 2018-07-05 DIAGNOSIS — I51.89 DIASTOLIC DYSFUNCTION: Primary | ICD-10-CM

## 2018-07-05 PROCEDURE — 71046 X-RAY EXAM CHEST 2 VIEWS: CPT | Mod: TC,FY,PO,NTX

## 2018-07-05 PROCEDURE — 90714 TD VACC NO PRESV 7 YRS+ IM: CPT | Mod: PBBFAC,PO

## 2018-07-05 PROCEDURE — 99214 OFFICE O/P EST MOD 30 MIN: CPT | Mod: S$PBB,,, | Performed by: FAMILY MEDICINE

## 2018-07-05 PROCEDURE — 71046 X-RAY EXAM CHEST 2 VIEWS: CPT | Mod: 26,NTX,S$GLB, | Performed by: RADIOLOGY

## 2018-07-05 PROCEDURE — 99213 OFFICE O/P EST LOW 20 MIN: CPT | Mod: PBBFAC,25,PO | Performed by: FAMILY MEDICINE

## 2018-07-05 PROCEDURE — 99999 PR PBB SHADOW E&M-EST. PATIENT-LVL III: CPT | Mod: PBBFAC,,, | Performed by: FAMILY MEDICINE

## 2018-07-05 RX ORDER — ISOSORBIDE MONONITRATE 10 MG/1
10 TABLET ORAL NIGHTLY
Qty: 30 TABLET | Refills: 3 | Status: SHIPPED | OUTPATIENT
Start: 2018-07-05 | End: 2018-10-24 | Stop reason: SDUPTHER

## 2018-07-05 NOTE — PATIENT INSTRUCTIONS
Heart Disease Education    The heart beats 60 to 100 times per minute, 24 hours a day. This equals almost 1000,000 times a day. It pumps blood with oxygen and nutrients to the tissues and organs of the body. But the heart is a muscle and needs its own supply of blood. Blood flow to the heart is supplied by the coronary arteries. Coronary artery disease (atherosclerosis) is a result of cholesterol, saturated fat, and calcium deposits (plaques) that build up inside the walls. This causes inflammation within the coronary arteries. These plaques narrow the artery and reduce blood flow to the heart muscle. The reduction in blood flow to the heart muscle decreases oxygen supply to the heart. If the narrowing is significant enough, the oxygen supply to one or more regions of the heart can be temporarily or permanently shut down. This can cause chest pain, and possibly death of heart tissue (heart attack).  Types of chest pain  Angina is the name for pain in the heart muscle. Angina is a warning sign of serious heart disease. When untreated it can lead to a heart attack, also known as acute myocardial infarction, or AMI. Angina occurs when there is not enough blood and oxygen flowing to the heart for the amount of work it is doing. This most often happens during physical exertion, when the heart is working hardest. It is usually relieved by rest or nitroglycerin. Angina may also occur after a large meal when extra blood is sent to the digestive organs and less goes to the heart. In the case of advanced or unstable heart disease, angina can occur at rest or awaken you from sleep. Angina usually lasts from a few minutes up to 20 minutes or more. When treated early, the effects of angina can be reversed without permanent damage to the heart. Angina is a serious condition and needs to be evaluated by a medical professional immediately.  There are two types of angina -- stable and unstable:  · Stable angina usually occurs  with a predictable level of activity. Being stable, its character, severity, and occurrence do not change much over time. It usually starts with activity, and resolves with rest or taking your medicine as instructed by your doctor. The symptoms usually do not last long.  · Unstable angina changes or gets worse over time. It is different from whatever you are used to. It may feel different or worse, begin without cause, occur with exercise or exertion, wake you up from sleep, and last longer. It may not respond in the same way as it does when you take your usual medicines for an attack. This type of angina can be a warning sign of an impending heart attack.     A heart attack is usually the result of a blood clot that suddenly forms in a coronary artery that has been narrowed with plaque. When this occurs, blood flow may be cut off to a part of the heart muscle, causing the cells to die. This weakens the pumping action of the heart, which affects the delivery of blood to all the other organs in the body including the brain. This damage is not reversible. However, early treatment can limit the amount of damage.  The pain you feel with angina and a heart attack may have a similar quality. However, it is usually different in intensity and duration. Here are some typical descriptions of a heart attack:  · It is most often experienced as a squeezing, crushing, pressure-like sensation in the center of the chest.  · It is sometimes described as something heavy sitting on my chest.  · It may feel more like a bad case of indigestion.  · The pain may spread from the chest to the arm, shoulder, throat or jaw.  · Sometimes the pain is not felt in the chest at all, but only in the arm, shoulder, throat or jaw.  · There may also be nausea, vomiting, dizziness or light-headedness, sweating and trouble breathing.  · Palpitations, or your heart beating rapidly  · A new, irregular heart beat  · Unexplained weakness  You may not be  "able to tell the difference between "bad" angina and a heart attack at home. Seek help if your symptoms are different than usual. Do not be in denial or just try to "tough it out."  Call 911  This is the fastest and safest way to get to the emergency department. The paramedics can also start treatment on the way to the hospital, saving valuable time for your heart.  · If the angina gets worse, if it continues, or if it stops and returns, call 911 immediately. Do not delay. You may be having a heart attack.  · After you call 911, take a second tablet or spray unless instructed otherwise. When repeating doses, sit down if possible, because it can make you feel lightheaded or dizzy. Wait another 5 minutes. If the angina still does not go away, take a third tablet or spray. Do not take more than 3 tablets or sprays within 15 minutes. Stay on the phone with 911 for further instruction.  · Your healthcare provider may give you slightly different instructions than those above. If so, follow them carefully.  Do not wait until symptoms become severe to call 911.  Other reasons to call 911 include:  · Trouble breathing  · Feeling lightheaded, faint, or dizzy  · Rapid heart beat  · Slower than usual heart rate compared to your normal  · Angina with weakness, dizziness, fainting, heavy sweating, nausea, or vomiting  · Extreme drowsiness, confusion  · Weakness of an arm or leg or one side of the face  · Difficulty with speech or vision  When to seek medical care  Remember, the signs and symptoms of a heart attack are not always like they are on TV. Sometimes they are not so obvious. You may only feel weak, or just not right. If it is not clear or if you have any doubt, call for advice.  · Seek help if there is a change in the type of pain, if it feels different, or if your symptoms are mild.  · Do not drive yourself. Have someone else drive you. If no one can drive, call 911.  · Do not delay. Fast diagnosis and treatment can " "prevent or limit the amount of heart damage during a heart attack.  · Do not go to your doctor's office or a clinic as they may not be able to provide all the testing and treatment required for this condition.  · If your doctor has given you medicine to take when symptoms occur, take them but don't delay getting help trying to locate medicines.  What happens in the emergency department  The emergency department is connected to your local emergency medical system (EMS) through 911. That's why during a cardiac emergency, calling 911 is the fastest way to get help. The goal of the emergency department is to rapidly screen, evaluate, and treat people.  Once you are there, an electrocardiogram (ECG or heart tracing) will be done. Blood samples may be taken to look for the presence of heart enzymes that leak from damaged heart cells and show if a heart attack is occurring. You will often be evaluated by a heart specialist (cardiologist) who decides the best course of action. In the case of severe angina or early heart attack, and depending on the circumstances, powerful "clot busting" medicines can be used to dissolve blood clots in the coronary artery. In other cases, you may be taken to a cardiac catheterization lab. Here, a tiny balloon-tipped catheter is advanced through blood vessels to the heart. There the balloon is inflated pushing open the blood vessel restoring blood flow.  Risk factors for heart disease  Risk factors for heart disease are a combination of genetic and lifestyle. Many risk factors work by either directly or indirectly damaging the blood vessels of the heart, or by increasing the risk of forming blood or cholesterol clots, which then clog up and block the arteries.     Examples of physical lifestyle risk factors:  · Cigarette smoking  · High blood pressure  · High blood cholesterol  · Use of stimulant drugs such as cocaine, crack, and amphetamines  · Eating a high-fat, high-cholesterol " meal  · Diabetes   · Obesity which increases risk for diabetes and high blood pressure  · Lack of regular physical activity     Examples of emotional lifestyle factors:  · Chronic high stress levels release stress hormones. These raise blood pressure and cholesterol level and makes blood clot more easily.  · Held-in anger, hostile or cynical attitude  · Social and emotional isolation, lack of intimacy  · Loss of relationship  · Depression  Other factors that increase the risk of heart attack that you cannot control :  · Age. The older you get beyond 40, the greater is your risk of significant coronary artery disease.  · Gender. More men than women get heart disease; but once past menopause, women who are not taking estrogen replacement have the same risk as men for a heart attack.  · Family history. If your mother, father, brother or sister has coronary artery disease, your risk of having it is higher than a person your age without this family history.  What can you do to decrease your risk  To reduce your risk of heart disease:  · Get regular checkups with your doctor.  · Take your medicines for blood pressure, cholesterol or diabetes as directed.  · Watch your diet. Eat a heart healthy diet choosing fresh foods, less salt, cholesterol, and fat  · Stop smoking. Get help if needed.  · Get regular exercise.  · Manage stress.  · Carry a list of medicines and doses in your wallet.  Date Last Reviewed: 12/30/2015  © 1233-1008 [employer]. [address] [place] [address], [place] [address]. All rights reserved. This information is not intended as a substitute for professional medical care. Always follow your healthcare professional's instructions.        Chronic Kidney Disease (CKD)     The role of the kidneys is to remove waste products and extra water from the blood.  When the kidneys do not work as they should, waste products begin to build up in the blood. This is called chronic kidney disease (CKD). CKD means  that you have kidney damage or a decrease in kidney function lasting at least 3 months. CKD allows extra water, waste, and toxins to build up in the body. This can eventually become life-threatening. You might need dialysis or a kidney transplant to stay alive. This most severe form is called end stage renal disease.  Diabetes is the leading causes of chronic renal failure. Other causes include high blood pressure, hardening of the arteries (atherosclerosis), lupus, inflammation of the blood vessels (vasculitis), and past viral or bacterial infections. Certain over-the-counter pain medicines can cause renal failure when taken often over a long period of time. These include aspirin, ibuprofen, and related anti-inflammatory medicines called NSAIDs (nonsteroidal anti-inflammatory drugs).  Home care  The following guidelines will help you care for yourself at home:  · If you have diabetes, talk with your healthcare provider about keeping your blood sugar under control. Ask if you need to make and changes to your diet, lifestyle, or medicines.  · If you have high blood pressure:  ¨ Take prescribed medicine to lower your blood pressure to the recommended goal of less than 130/80.  ¨ Start a regular exercise program that you enjoy. Check with your healthcare provider to be sure your planned exercise program is right for you.  ¨ Eat less salt (sodium). Your healthcare provider can tell you how much salt per day is safe for you.  · If you are overweight, talk with your healthcare provider about a weight loss plan.  · If you smoke, you must quit. Smoking makes kidney disease worse. Talk with your healthcare provider about ways to help you quit.  For more information, visit the following links:  ¨ www.smokefree.gov/sites/default/files/pdf/clearing-the-air-accessible.pdf  ¨ www.smokefree.gov  ¨ www.cancer.org/healthy/stayawayfromtobacco/guidetoquittingsmoking/  · Most people with CKD need to follow a special diet.  Be sure you  understand yours. In general, you will need to limit protein, salt, potassium, and phosphorus. You also need to limit how much fluid you drink.   · CKD is a risk factor for heart disease. Talk with your healthcare provider about any other risk factors you might have and what you can do to lessen them.  · Talk with your healthcare provider about any medicines you are taking to find out if they need to be reduced or stopped.  · Don't use the following over-the-counter medicines, or consult your healthcare provider before using:  ¨ Aspirin and NSAIDs such as ibuprofen or naproxen. Using acetaminophen for fever or pain is OK.  ¨ Laxatives and antacids containing magnesium or aluminum  ¨ Fleet or phospho soda enemas containing phosphorus  ¨ Certain stomach acid-blocking medicine such as cimetidine or ranitidine   ¨ Decongestants containing pseudoephedrine   ¨ Herbal supplements  Follow-up care  Follow up with your healthcare provider, or as advised. Contact one of the following for more information:  · American Association of Kidney Patients 606-897-2292 www.aakp.org  · National Kidney Foundation 550-679-7413 www.kidney.org  · American Kidney Fund 299-426-1200 www.kidneyfund.org  · National Kidney Disease Education Program 866-4KIDNEY www.nkdep.nih.gov  If an X-ray, ECG (cardiogram), or other diagnostic test was taken, you will be told of any new findings that may affect your care.  Call 911  Call 911 if you have any of the following:  · Severe weakness, dizziness, fainting, drowsiness, or confusion  · Chest pain or shortness of breath  · Heart beating fast, slow, or irregularly  When to seek medical advice  Call your healthcare provider right away if any of these occur:  · Nausea or vomiting  · Fever of 100.4°F (38°C) or higher, or as directed by your healthcare provider  · Unexpected weight gain or swelling in the legs, ankles, or around the eyes  · Decrease or absent urine output  Date Last Reviewed: 9/1/2016  ©  6961-2639 [employer]. [address] [place] [address], [place] [address]. All rights reserved. This information is not intended as a substitute for professional medical care. Always follow your healthcare professional's instructions.        Diet for Chronic Kidney Disease  Following a special diet when you have kidney disease can help you stay as healthy as possible. Your healthcare provider or dietitian should make a special diet plan just for you.    Eating right  Here are some good eating rules to follow:  · Protein. Eating protein is important for your body. But too much protein can put a strain on your kidneys. Eating less protein may slow the progression of chronic kidney disease. Foods high in protein include meat, fish, eggs, cheese, and other dairy products. A registered dietitian can help you plan a diet that has the right amount of protein for you.  · Sodium. Having too much salt in your diet can make your body hold onto (retain) water. Ask your provider or dietitian how much sodium per day you are allowed. This will help you avoid fluid buildup in your body (fluid retention). It can also help control high blood pressure. Learn to read food labels to know how much sodium is in one serving. Foods high in salt include processed meats, canned and boxed foods, sauces, salted chips and snacks, pickled foods, frozen dinners, and restaurant and fast food.  · Fluids. If you have advanced kidney disease, you will need to limit the water and fluids you drink. If you dont, then too much water will build up in your body. The exact amount of fluid you can drink depends on how well your kidneys are working. Ask your provider how much water you can safely drink each day.  · Potassium. In advanced kidney disease, your potassium level can go dangerously high. This affects your heart. It can cause an irregular heartbeat (arrhythmia). Ask your provider or dietitian if you should limit potassium in your diet. Foods  high in potassium include dairy products (milk, yogurt, cheese), dried beans, bananas, oranges, potatoes, tomatoes, spinach, cantaloupe, honeydew melon, dried fruits, and nuts.   · Calcium. Calcium is important to build strong bones. But foods high in calcium are also high in phosphorus, which can take calcium from your bones. Limiting foods high in phosphorus will help keep calcium in your bones. Ask your provider how much calcium you should get each day.  · Phosphorus. In advanced kidney disease, your phosphorus level can go dangerously high. This affects many systems in the body and can damage your heart. Limit your intake of phosphorus-rich foods. These include dried beans and peas, nuts, peanut butter, cocoa, beer, cola drinks, and dairy products.  Date Last Reviewed: 8/1/2016  © 3979-8760 [employer]. [address] [place] [address]. All rights reserved. This information is not intended as a substitute for professional medical care. Always follow your healthcare professional's instructions.        Phosphorus  Does this test have other names?  Phosphorus blood test, phosphate test  What is this test?  This test measures the level of phosphorus in your blood. Phosphorus is a common mineral found in the food you eat. It's also found in teeth and bones.  Having a high or low level of phosphorus in your bloodstream can signal a number of health conditions. Most commonly, a high level of phosphorus is related to a kidney disorder. It shows that your kidneys are having difficulty clearing phosphorus from your blood. A high level of phosphorus can also mean uncontrolled diabetes and other endocrine disorders.  Why do I need this test?  You may need a phosphorus test if your healthcare provider suspects that you have a kidney disorder. You may also need this test if you are having trouble controlling your diabetes. You may need this test if you have symptoms of kidney disease, including bone  problems, fatigue, and weakness, but you may not have any symptoms at all.  What other tests might I have along with this test?  Phosphorus levels are closely related to calcium levels, so your healthcare provider will probably test your calcium level as well. Additional tests depend on which condition your healthcare provider suspects.  What do my test results mean?  Many things may affect your lab test results. These include the method each lab uses to do the test. Even if your test results are different from the normal value, you may not have a problem. To learn what the results mean for you, talk with your healthcare provider.  Results of this test are given in milligrams per deciliter (mg/dL). A normal phosphorus reading is between 2.7 and 4.6 mg/dL, but normal ranges may vary. A number that is either higher or lower than that could mean a possible kidney problem.  How is this test done?  The test requires a blood sample, which is drawn through a needle from a vein in your arm.  Does this test pose any risks?  Taking a blood sample with a needle carries risks that include bleeding, infection, bruising, or feeling dizzy. When the needle pricks your arm, you may feel a slight stinging sensation or pain. Afterward, the site may be slightly sore.  What might affect my test results?  Phosphorus levels can be affected by what you eat and drink, including:  · Chocolate  · Cheese  · Fish  · Many types of beans  · Beer  · Cola  Some medicines and medical procedures, such as dialysis, can also affect phosphorus levels.  How do I get ready for this test?  You may need to fast starting at midnight before the test. Ask your healthcare provider for specific instructions.  Be sure your provider knows about all medicines, herbs, vitamins, and supplements you are taking. This includes medicines that don't need a prescription and any illicit drugs you may use.   © 8772-5048 [employer]. [address] [place] [address],  [place] [address]. All rights reserved. This information is not intended as a substitute for professional medical care. Always follow your healthcare professional's instructions.        Phosphorus Salts tablets  What is this medicine?  PHOSPHORUS SALTS (F[facility] sawlts) is used to treat high pH levels in the urine. It is used to prevent calcium kidney stones. This medicine can also work with some antibiotics for urinary tract infections.  How should I use this medicine?  Take this medicine by mouth. Follow the directions on the package or prescription label. Dissolve this medicine in 6 to 8 ounces of water as directed. Allow tablets to soak in water for 2 to 5 minutes or more. If part of the tablet does not dissolve, it may be crushed and then the solution stirred vigorously. Shake or stir well before taking. Take your medicine at regular intervals. Do not take your medicine more often than directed.  Talk to your pediatrician regarding the use of this medicine in children. Special care may be needed.  What side effects may I notice from receiving this medicine?  Side effects that you should report to your doctor or health care professional as soon as possible:  · allergic reactions like skin rash, itching or hives, swelling of the face, lips, or tongue  · bone or joint pain  · breathing problems  · confusion  · fast, irregular heartbeat  · feeling faint or lightheaded, falls  · heaviness in the legs  · high blood pressure  · muscle cramps  · tingling, pain, or numbness in the hands or feet  · unusually weak or tired  Side effects that usually do not require medical attention (report to your doctor or health care professional if they continue or are bothersome):  · diarrhea  · nausea, vomiting  · stomach pain  What may interact with this medicine?  Do not take this medicine with any of the following medications:  · sevelamer  This medicine may also interact with the following medications:  · antacids containing  aluminum, magnesium, or calcium  · aspirin and aspirin-like medicines  · cyclosporine  · diuretics  · eplerenone  · medicines for blood pressure like captopril, enalapril, lisinopril  · potassium supplements, salt substitutes, or low-salt milk  · vitamin D supplements  What if I miss a dose?  If you miss a dose, take it as soon as you can. If it is almost time for your next dose, take only that dose. Do not take double or extra doses.  Where should I keep my medicine?  Keep out of the reach of children.  Store at room temperature between 20 and 25 degrees C (68 and 77 degrees F). Protect from light. Throw away any unused medicine after the expiration date.  What should I tell my health care provider before I take this medicine?  They need to know if you have any of these conditions:  · high levels of calcium in the blood  · high levels of phosphate in the blood  · kidney disease  · phosphate stones  · an unusual or allergic reaction to phosphorus salts, other medicines, foods, dyes, or preservatives  · pregnant or trying to get pregnant  · breast-feeding  What should I watch for while using this medicine?  Visit your health care professional for regular checks on your progress. Your doctor may order blood work while you are taking this medicine.  You may pass a kidney stone after starting this medicine. Contact your doctor if you have new or unusual symptoms.  Avoid products with aluminum, calcium, magnesium as these may bind to this medicine.  NOTE:This sheet is a summary. It may not cover all possible information. If you have questions about this medicine, talk to your doctor, pharmacist, or health care provider. Copyright© 2017 [employer]

## 2018-07-06 ENCOUNTER — OFFICE VISIT (OUTPATIENT)
Dept: HEMATOLOGY/ONCOLOGY | Facility: CLINIC | Age: 78
End: 2018-07-06
Payer: MEDICARE

## 2018-07-06 VITALS
TEMPERATURE: 98 F | HEART RATE: 65 BPM | BODY MASS INDEX: 27.96 KG/M2 | HEIGHT: 73 IN | SYSTOLIC BLOOD PRESSURE: 149 MMHG | DIASTOLIC BLOOD PRESSURE: 72 MMHG | WEIGHT: 211 LBS | RESPIRATION RATE: 19 BRPM

## 2018-07-06 DIAGNOSIS — D51.9 ANEMIA DUE TO VITAMIN B12 DEFICIENCY, UNSPECIFIED B12 DEFICIENCY TYPE: Primary | ICD-10-CM

## 2018-07-06 DIAGNOSIS — M54.9 CHRONIC BACK PAIN, UNSPECIFIED BACK LOCATION, UNSPECIFIED BACK PAIN LATERALITY: ICD-10-CM

## 2018-07-06 DIAGNOSIS — G89.29 CHRONIC BACK PAIN, UNSPECIFIED BACK LOCATION, UNSPECIFIED BACK PAIN LATERALITY: ICD-10-CM

## 2018-07-06 DIAGNOSIS — R06.09 DOE (DYSPNEA ON EXERTION): ICD-10-CM

## 2018-07-06 DIAGNOSIS — Z79.899 ERYTHROPOIETIN (EPO) STIMULATING AGENT ANEMIA MANAGEMENT PATIENT: ICD-10-CM

## 2018-07-06 DIAGNOSIS — D63.1 ANEMIA IN CHRONIC KIDNEY DISEASE, UNSPECIFIED CKD STAGE: ICD-10-CM

## 2018-07-06 DIAGNOSIS — R53.83 FATIGUE, UNSPECIFIED TYPE: ICD-10-CM

## 2018-07-06 DIAGNOSIS — D64.9 ERYTHROPOIETIN (EPO) STIMULATING AGENT ANEMIA MANAGEMENT PATIENT: ICD-10-CM

## 2018-07-06 DIAGNOSIS — N40.0 BENIGN NON-NODULAR PROSTATIC HYPERPLASIA WITHOUT LOWER URINARY TRACT SYMPTOMS: ICD-10-CM

## 2018-07-06 DIAGNOSIS — N18.9 ANEMIA IN CHRONIC KIDNEY DISEASE, UNSPECIFIED CKD STAGE: ICD-10-CM

## 2018-07-06 LAB — METHYLMALONATE SERPL-SCNC: 0.53 UMOL/L

## 2018-07-06 PROCEDURE — 99214 OFFICE O/P EST MOD 30 MIN: CPT | Mod: S$PBB,,, | Performed by: INTERNAL MEDICINE

## 2018-07-06 PROCEDURE — 99999 PR PBB SHADOW E&M-EST. PATIENT-LVL IV: CPT | Mod: PBBFAC,,, | Performed by: INTERNAL MEDICINE

## 2018-07-06 PROCEDURE — 99214 OFFICE O/P EST MOD 30 MIN: CPT | Mod: PBBFAC,PO | Performed by: INTERNAL MEDICINE

## 2018-07-06 RX ORDER — FINASTERIDE 5 MG/1
TABLET, FILM COATED ORAL
Qty: 90 TABLET | Refills: 3 | Status: SHIPPED | OUTPATIENT
Start: 2018-07-06 | End: 2019-10-11 | Stop reason: SDUPTHER

## 2018-07-06 NOTE — PROGRESS NOTES
CC : I feel bad     Micheal Hunt is a 77 y.o.  Pt is here for evaluation of anemia.  He is accompanied by his daughter today.  His labs reveal continued anemia with a hemoglobin less than 10 and sufficient iron stores.  He is documented renal disease for which she sees nephrology regularly.  He has not received erythropoietin in the past.  He is here today to see if he qualifies for such.  His daughter reports that he is extremely fatigued and the patient admits he has been experiencing shortness of breath with minimal exertion he is no longer taking any nonsteroidal anti-inflammatory's.  He has no evidence of melena or hematochezia.  Past Medical History:   Diagnosis Date    Allergy     Arthritis     Gout    BPH (benign prostatic hyperplasia)     CAD (coronary artery disease) 2006    Chronic kidney disease     due to ibuprofen    Colon polyp     Diverticulosis     Gastritis     GERD (gastroesophageal reflux disease)     HTN (hypertension) 3/27/2012    Hyperlipidemia     LVH (left ventricular hypertrophy)     Mesenteric ischemia     Murmur, cardiac 3/27/2012    GRICELDA (obstructive sleep apnea)     DOES NOT USE A MACHINE    Sinus problem     Syncope and collapse          Current Outpatient Prescriptions:     albuterol 90 mcg/actuation inhaler, 2 puffs every 4 hours as needed for cough, wheeze, or shortness of breath, Disp: 3 Inhaler, Rfl: 3    albuterol-ipratropium 2.5mg-0.5mg/3mL (DUO-NEB) 0.5 mg-3 mg(2.5 mg base)/3 mL nebulizer solution, INHALE 1 VIAL BY NEBULIZER EVERY 6 HOURS AS NEEDED FOR WHEEZING, Disp: 270 mL, Rfl: 0    allopurinol (ZYLOPRIM) 100 MG tablet, Take 1 tablet (100 mg total) by mouth once daily., Disp: 90 tablet, Rfl: 1    amLODIPine (NORVASC) 2.5 MG Tab, Take 1 tablet (2.5 mg total) by mouth every evening., Disp: 903 tablet, Rfl: 1    aspirin (ECOTRIN) 81 MG EC tablet, Take 81 mg by mouth once daily.  , Disp: , Rfl:     atorvastatin (LIPITOR) 80 MG tablet, TAKE 1 TABLET  (80 MG TOTAL) BY MOUTH ONCE DAILY., Disp: 90 tablet, Rfl: 3    calcitRIOL (ROCALTROL) 0.25 MCG Cap, , Disp: , Rfl:     carvedilol (COREG) 6.25 MG tablet, TAKE 1 TABLET (6.25 MG TOTAL) BY MOUTH 2 (TWO) TIMES DAILY WITH MEALS., Disp: 90 tablet, Rfl: 3    COLCRYS 0.6 mg tablet, TAKE 1 TABLET (0.6 MG TOTAL) BY MOUTH ONCE DAILY., Disp: , Rfl: 11    cyanocobalamin, vitamin B-12, (VITAMIN B-12) 1,000 mcg Subl, Take 1 tablet by mouth daily as needed. Takes 3,000mcg, Disp: , Rfl:     finasteride (PROSCAR) 5 mg tablet, TAKE 1 TABLET ONCE DAILY., Disp: 90 tablet, Rfl: 3    fluticasone (FLONASE) 50 mcg/actuation nasal spray, 2 sprays (100 mcg total) by Each Nare route once daily. (Patient taking differently: 2 sprays by Each Nare route daily as needed. ), Disp: 3 Bottle, Rfl: 3    gabapentin (NEURONTIN) 100 MG capsule, Take 1 capsule (100 mg total) by mouth every evening., Disp: 90 capsule, Rfl: 0    isosorbide mononitrate (ISMO,MONOKET) 10 mg tablet, Take 1 tablet (10 mg total) by mouth every evening., Disp: 30 tablet, Rfl: 3    NITROSTAT 0.4 mg SL tablet, PLACE 1 TABLET (0.4 MG TOTAL) UNDER THE TONGUE EVERY 5 (FIVE) MINUTES AS NEEDED FOR CHEST PAIN., Disp: 25 tablet, Rfl: 3    sodium chloride (SALINE NASAL MIST) 3 % Mist, 1 spray by Nasal route 2 (two) times daily., Disp: , Rfl:     tamsulosin (FLOMAX) 0.4 mg Cp24, Take 1 capsule (0.4 mg total) by mouth once daily., Disp: 30 capsule, Rfl: 11    valsartan (DIOVAN) 320 MG tablet, Take 1 tablet (320 mg total) by mouth once daily., Disp: 90 tablet, Rfl: 3    zolpidem (AMBIEN) 5 MG Tab, TAKE 1 TABLET (5 MG TOTAL) BY MOUTH NIGHTLY AS NEEDED., Disp: 30 tablet, Rfl: 5     He is tolerating Diovan for hypertension continues taking Ambien to help with insomnia    CONSTITUTIONAL: No fevers, chills, night sweats, + fatigue   SKIN: no rashes or itching  ENT: No headaches, head trauma, vision changes, or eye pain  LYMPH NODES: None noticed   CV: No chest pain, palpitations.  "  RESP:+ dyspnea on exertion,no cough, wheezing, or hemoptysis  GI: No nausea, emesis, diarrhea, constipation, melena, hematochezia, pain.   : No dysuria, hematuria, urgency, or frequency   HEME: No easy bruising, bleeding problems  PSYCHIATRIC: No depression, anxiety, psychosis, hallucinations.  NEURO: No seizures, memory loss, ++dizziness  No difficulty sleeping  MSK: No arthralgias or joint swelling         BP (!) 149/72   Pulse 65   Temp 97.6 °F (36.4 °C) (Oral)   Resp 19   Ht 6' 1" (1.854 m)   Wt 95.7 kg (210 lb 15.7 oz)   BMI 27.84 kg/m²   Gen: NAD, A and O x3,   Psych: pleasant affect, normal thought process  Eyes: Pupils round and non dilated, EOM intact  Nose: Nares patent  OP clear, mucosa patent  Neck: suppple, no JVD, trachea midline  Lungs: CTAB,  no use of accessory muscles  CV: S1S2 with RR No mrg  Abd: soft, NTND, + BS, No HSM, no ascites  Extr: No CCE, CLARITA  Neuro: steady gait  Skin: intact, no lesions noted  Rheum: No joint swelling    Lab Results   Component Value Date    WBC 6.46 07/05/2018    HGB 9.7 (L) 07/05/2018    HCT 29.8 (L) 07/05/2018    MCV 95 07/05/2018     07/05/2018     CMP  Sodium   Date Value Ref Range Status   07/05/2018 136 136 - 145 mmol/L Final     Potassium   Date Value Ref Range Status   07/05/2018 4.7 3.5 - 5.1 mmol/L Final     Chloride   Date Value Ref Range Status   07/05/2018 101 95 - 110 mmol/L Final     CO2   Date Value Ref Range Status   07/05/2018 24 23 - 29 mmol/L Final     Glucose   Date Value Ref Range Status   07/05/2018 88 70 - 110 mg/dL Final     BUN, Bld   Date Value Ref Range Status   07/05/2018 61 (H) 8 - 23 mg/dL Final     Creatinine   Date Value Ref Range Status   07/05/2018 6.1 (H) 0.5 - 1.4 mg/dL Final   08/08/2013 1.5 (H) 0.5 - 1.4 mg/dL Final     Calcium   Date Value Ref Range Status   07/05/2018 11.1 (H) 8.7 - 10.5 mg/dL Final   08/08/2013 9.0 8.7 - 10.5 mg/dL Final     Total Protein   Date Value Ref Range Status   07/05/2018 7.2 6.0 - " 8.4 g/dL Final     Albumin   Date Value Ref Range Status   07/05/2018 3.9 3.5 - 5.2 g/dL Final   09/12/2013 4.3 3.6 - 5.1 g/dL Final     Comment:     @ Test Performed By:  [facility]  JACK Hernandez M.D.,   [address] [place] [address]  IA #[id number]     Total Bilirubin   Date Value Ref Range Status   07/05/2018 0.5 0.1 - 1.0 mg/dL Final     Comment:     For infants and newborns, interpretation of results should be based  on gestational age, weight and in agreement with clinical  observations.  Premature Infant recommended reference ranges:  Up to 24 hours.............<8.0 mg/dL  Up to 48 hours............<12.0 mg/dL  3-5 days..................<15.0 mg/dL  6-29 days.................<15.0 mg/dL       Alkaline Phosphatase   Date Value Ref Range Status   07/05/2018 62 55 - 135 U/L Final     AST   Date Value Ref Range Status   07/05/2018 15 10 - 40 U/L Final     ALT   Date Value Ref Range Status   07/05/2018 10 10 - 44 U/L Final     Anion Gap   Date Value Ref Range Status   07/05/2018 11 8 - 16 mmol/L Final   08/08/2013 11 5 - 15 meq/L Final     eGFR if    Date Value Ref Range Status   07/05/2018 9.3 (A) >60 mL/min/1.73 m^2 Final     eGFR if non    Date Value Ref Range Status   07/05/2018 8.1 (A) >60 mL/min/1.73 m^2 Final     Comment:     Calculation used to obtain the estimated glomerular filtration  rate (eGFR) is the CKD-EPI equation.         Ref Range & Units 9d ago   Methlymalonic Acid <0.40 umol/L 0.53     Comment: If applicable, any drug confirmation testing reported    Haptoglobin decreased  Elevated kappa and llambda yet normal ratio    Anemia due to vitamin B12 deficiency, unspecified B12 deficiency type  -     Haptoglobin; Future; Expected date: 07/06/2018  -     cyanocobalamin, vitamin B-12, 1,000 mcg/mL Drop; Place 1 drop under the tongue once daily.  Dispense: 60 mL; Refill: 12    Anemia in chronic kidney  disease, unspecified CKD stage    Erythropoietin (EPO) stimulating agent anemia management patient    Fatigue, unspecified type    ARMAS (dyspnea on exertion)    Chronic back pain, unspecified back location, unspecified back pain laterality    BMI 29.0-29.9,adult    Other orders  -     epoetin kitty injection 20,000 Units; Inject 1 mL (20,000 Units total) into the skin every 7 days.        To start procrit weekly times 2 to stimulate marrow   Start B12 1000 mcg SL daily, pt qualifies for procrit as well given his renal function   Recheck haptoglobin in the near future to confirm that there is no further evidence of hemolysis.  Explained that he does not need IV iron and he should be cleared for Procrit  Goal of hemoglobin is 11 and above  He is to continue usual medications for hypertension  Continue Neurontin for intermittent paresthesias however this could cause further bone marrow suppression and his counts should be watched closely.  Return to clinic approximately 3-4 weeks to assess the need for further erythropoietin intervention    Thank you for allowing me to evaluate and participate in the care of this pleasant patient. Please fell free to call me with any questions or concerns.    Warmly,  Mikala Wells MD    This note was dictated with Dragon and briefly proofread. Please excuse any sentences that may be unclear or words misspelled

## 2018-07-09 ENCOUNTER — TELEPHONE (OUTPATIENT)
Dept: HEMATOLOGY/ONCOLOGY | Facility: CLINIC | Age: 78
End: 2018-07-09

## 2018-07-09 ENCOUNTER — TELEPHONE (OUTPATIENT)
Dept: FAMILY MEDICINE | Facility: CLINIC | Age: 78
End: 2018-07-09

## 2018-07-09 NOTE — TELEPHONE ENCOUNTER
Left message for pt that [facility] will contact him to schedule. Number to infusion & clinic left.

## 2018-07-09 NOTE — TELEPHONE ENCOUNTER
----- Message from Windy Augustine sent at 7/9/2018  9:16 AM CDT -----  Contact: Patient  Micheal, patient [phone] calling to speak with nurse in office regarding a shot he is supposed to get, he has not heard anything from the office. Please advise. Thanks.

## 2018-07-09 NOTE — TELEPHONE ENCOUNTER
----- Message from Windy Augustine sent at 7/9/2018  9:12 AM CDT -----  Contact: Patient  Micheal, patient [phone] calling to see if he was taken off of amLODIPine (NORVASC) 2.5 MG Tab. Please advise. Thanks.

## 2018-07-09 NOTE — TELEPHONE ENCOUNTER
Please see attached message from patient. Upon further inspection it was noted patient has a current order for Amlodipine 2.5 mg take one tablet by mouth every evening. Call placed to patient for notification. No answer received. Left message requesting return call to office. Also noted dispense total on prescription dated 7-5-18 is 903 tablets. Verbal order received from Dr. Lakhani that this prescription should read dispense 90 tablets with 3 refills. Call placed to pharmacy, spoke to Paloma (pharmacist) for clarification.

## 2018-07-10 NOTE — TELEPHONE ENCOUNTER
Please see attached message from patient. Verified with patient his current order for Amlodipine is 2.5 mg take one tablet by mouth every evening. Patient verbalized understanding.

## 2018-07-11 DIAGNOSIS — Z76.82 ORGAN TRANSPLANT CANDIDATE: Primary | ICD-10-CM

## 2018-07-16 ENCOUNTER — CLINICAL SUPPORT (OUTPATIENT)
Dept: PHYSICAL MEDICINE AND REHAB | Facility: CLINIC | Age: 78
End: 2018-07-16
Payer: MEDICARE

## 2018-07-16 ENCOUNTER — TELEPHONE (OUTPATIENT)
Dept: FAMILY MEDICINE | Facility: CLINIC | Age: 78
End: 2018-07-16

## 2018-07-16 VITALS
HEART RATE: 70 BPM | HEIGHT: 73 IN | SYSTOLIC BLOOD PRESSURE: 119 MMHG | BODY MASS INDEX: 27.83 KG/M2 | DIASTOLIC BLOOD PRESSURE: 66 MMHG | WEIGHT: 210 LBS

## 2018-07-16 DIAGNOSIS — M25.562 LEFT KNEE PAIN, UNSPECIFIED CHRONICITY: ICD-10-CM

## 2018-07-16 DIAGNOSIS — M25.569 KNEE PAIN, UNSPECIFIED CHRONICITY, UNSPECIFIED LATERALITY: ICD-10-CM

## 2018-07-16 PROCEDURE — 76942 ECHO GUIDE FOR BIOPSY: CPT | Mod: 26,S$PBB,, | Performed by: PHYSICAL MEDICINE & REHABILITATION

## 2018-07-16 PROCEDURE — 99999 PR PBB SHADOW E&M-EST. PATIENT-LVL III: CPT | Mod: PBBFAC,,, | Performed by: PHYSICAL MEDICINE & REHABILITATION

## 2018-07-16 PROCEDURE — 76942 ECHO GUIDE FOR BIOPSY: CPT | Mod: PBBFAC,PN | Performed by: PHYSICAL MEDICINE & REHABILITATION

## 2018-07-16 PROCEDURE — 64450 NJX AA&/STRD OTHER PN/BRANCH: CPT | Mod: S$PBB,LT,, | Performed by: PHYSICAL MEDICINE & REHABILITATION

## 2018-07-16 PROCEDURE — 99213 OFFICE O/P EST LOW 20 MIN: CPT | Mod: PBBFAC,PN,25 | Performed by: PHYSICAL MEDICINE & REHABILITATION

## 2018-07-16 PROCEDURE — 64450 NJX AA&/STRD OTHER PN/BRANCH: CPT | Mod: PBBFAC,PN | Performed by: PHYSICAL MEDICINE & REHABILITATION

## 2018-07-16 PROCEDURE — 99499 UNLISTED E&M SERVICE: CPT | Mod: S$PBB,,, | Performed by: PHYSICAL MEDICINE & REHABILITATION

## 2018-07-16 RX ORDER — LIDOCAINE HYDROCHLORIDE 10 MG/ML
10 INJECTION INFILTRATION; PERINEURAL
Status: COMPLETED | OUTPATIENT
Start: 2018-07-16 | End: 2018-07-16

## 2018-07-16 RX ADMIN — LIDOCAINE HYDROCHLORIDE 10 ML: 10 INJECTION INFILTRATION; PERINEURAL at 11:07

## 2018-07-16 NOTE — TELEPHONE ENCOUNTER
No call to patient noted from office. Call placed to patient for notification. No answer received. Voicemail full. Unable to leave message at this time.

## 2018-07-16 NOTE — PROGRESS NOTES
Subjective:       Patient ID: Micheal Hunt is a 77 y.o. male.    Chief Complaint: Hospital Follow Up (Pneumonia )    Patient with 76 y/o male recent admitted due to pneumonitis, he has chronic cough, and rt heart failure. He has chronic cough, edema, and chronic renal failure. He was able to diuretic and antibiotic with adequate improvement.      Pneumonia   He complains of cough, shortness of breath and wheezing. Associated symptoms include postnasal drip, rhinorrhea, sneezing and a sore throat. Pertinent negatives include no ear pain or fever.     Review of Systems   Constitutional: Positive for fatigue. Negative for chills and fever.   HENT: Positive for postnasal drip, rhinorrhea, sneezing and sore throat. Negative for ear discharge, ear pain and sinus pressure.    Respiratory: Positive for cough, shortness of breath and wheezing.    Neurological: Positive for dizziness.       Patient Active Problem List   Diagnosis    Osteoarthritis of right knee, L-TKR 10/2012    Nocturia    Hematuria    BPH (benign prostatic hyperplasia)    Carotid stenosis    Benign essential HTN    Hyperlipidemia, baseline     CAD (coronary artery disease), 2V CABG 2007    Gout, arthritis    Vertigo    LVH (left ventricular hypertrophy) due to hypertensive disease    Abnormal cardiovascular stress test    GERD (gastroesophageal reflux disease)    Elevated PSA    Acquired hammer toe    Diastolic dysfunction    Abdominal obesity    Back pain, chronic    Glucose intolerance (impaired glucose tolerance)    Anemia, mild    Gastric nodule    Diverticulitis, 2005, 2015    Personal history of colonic polyps    PSA elevation    DDD (degenerative disc disease), lumbar    LV dysfunction, EF 50%, reduced to 34%    Other spondylosis, lumbar region    Knee pain    NICK (acute kidney injury)    BPH with obstruction/lower urinary tract symptoms    Itching, both legs, onset 7/2017    Chronic sinusitis    Mild  persistent asthma without complication    Sigmoid diverticulitis    Anemia due to stage 4 chronic kidney disease    Thrombocytopenia    Renal cyst    Unilateral inguinal hernia    History of colon polyps    BMI 29.0-29.9,adult    LLL pneumonia    Chronic kidney disease, stage 5    Generalized abdominal pain    Chronic right-sided heart failure       Objective:      Physical Exam   Constitutional: He appears well-developed and well-nourished.   HENT:   Right Ear: Tympanic membrane and ear canal normal.   Left Ear: Tympanic membrane and ear canal normal.   Nose: Mucosal edema and rhinorrhea present. Right sinus exhibits no maxillary sinus tenderness and no frontal sinus tenderness. Left sinus exhibits no maxillary sinus tenderness and no frontal sinus tenderness.   Mouth/Throat: Posterior oropharyngeal erythema present. No oropharyngeal exudate, posterior oropharyngeal edema or tonsillar abscesses.   Cardiovascular: Normal rate, regular rhythm and normal heart sounds.    Pulmonary/Chest: Effort normal and breath sounds normal.   Skin: Skin is warm and dry.   Psychiatric: He has a normal mood and affect.   Nursing note and vitals reviewed.      Lab Results   Component Value Date    WBC 6.46 07/05/2018    HGB 9.7 (L) 07/05/2018    HCT 29.8 (L) 07/05/2018     07/05/2018    CHOL 102 (L) 04/25/2018    TRIG 117 04/25/2018    HDL 31 (L) 04/25/2018    ALT 10 07/05/2018    AST 15 07/05/2018     07/05/2018    K 4.7 07/05/2018     07/05/2018    CREATININE 6.1 (H) 07/05/2018    BUN 61 (H) 07/05/2018    CO2 24 07/05/2018    TSH 0.787 04/26/2017    PSA 4.01 (H) 06/03/2013    INR 1.0 06/14/2018    HGBA1C 6.2 02/13/2015     The ASCVD Risk score (Bhavin ANTONIO Jr., et al., 2013) failed to calculate for the following reasons:    The valid total cholesterol range is 130 to 320 mg/dL    Assessment:       1. Diastolic dysfunction    2. Essential hypertension    3. Chronic right-sided heart failure    4. Abnormal  cardiovascular stress test    5. Need for tetanus booster        Plan:       Diastolic dysfunction    Essential hypertension  -     amLODIPine (NORVASC) 2.5 MG Tab; Take 1 tablet (2.5 mg total) by mouth every evening.  Dispense: 903 tablet; Refill: 1    Chronic right-sided heart failure    Abnormal cardiovascular stress test    Need for tetanus booster  -     (In Office Administered) Td Vaccine - Preservative Free    Other orders  -     isosorbide mononitrate (ISMO,MONOKET) 10 mg tablet; Take 1 tablet (10 mg total) by mouth every evening.  Dispense: 30 tablet; Refill: 3      Patient readiness: acceptance and barriers:none    During the course of the visit the patient was educated and counseled about the following:     Hypertension:   Screening for causes of secondary hypertension: GFR (chronic kidney disease).    Goals: Hypertension: Reduce Blood Pressure    Did patient meet goals/outcomes: Yes    The following self management tools provided: blood pressure log  excercise log    Patient Instructions (the written plan) was given to the patient/family.     Time spent with patient: 30 minutes    Barriers to medications present (no )    Adverse reactions to current medications (no)    Over the counter medications reviewed (Yes)        40-minute visit. 30 minutes spent counseling patient on diet, exercise, and weight loss.  This has been fully explained to the patient, who indicates understanding.

## 2018-07-16 NOTE — PROGRESS NOTES
pROCEDURE NOTE: risk and benefit of left sAPHENOUS NERVE BLOCK AND HYDRODISEECTION injection reviewed with. patient. Verbal consent was obtained. Injection was performed after sterile prep w. Betadine +cholorohexedine. MEDIAL approach used. ALONG  EDGE OF VASTUS MEDIALIS MUSCLE. 25g 2 inch needle used hydrodissecting along vastus medialis facial plane   Ultrasound guidance was utilized for needle visualization. A TOTAL OF 10ML OF LIDOCAINE 1% AND 10ML OF STERILE NORMAL SALINE WAS UTILIZED. No complications. iMMEDIATE IMPROVEMENT OF KNEE PAIN POST PROCEDURE     Ultrasound interpretation was performed prior to the procedure to identify the target and any adjacent neurovascular structures.  Subsequently, interpretation was performed during real- time needle guidance confirming placement. Post- intervention interpretation was also performed confirming appropriate injectate flow and hemostasis.  Images indicating needle placement have been saved in [facility].

## 2018-07-16 NOTE — TELEPHONE ENCOUNTER
----- Message from Cecille Hess sent at 7/16/2018  3:25 PM CDT -----  Contact: Pt  .Type:  Patient Returning Call    Who Called:  Pt doesn't know  Who Left Message for Patient: Micheal  Does the patient know what this is regarding?:  results  Best Call Back Number: .[phone] (home)   Additional Information: Pt states he missed a call for results but doesn't know from who

## 2018-07-17 ENCOUNTER — LAB VISIT (OUTPATIENT)
Dept: LAB | Facility: HOSPITAL | Age: 78
End: 2018-07-17
Attending: INTERNAL MEDICINE
Payer: MEDICARE

## 2018-07-17 DIAGNOSIS — D64.9 ANEMIA, UNSPECIFIED TYPE: ICD-10-CM

## 2018-07-17 DIAGNOSIS — R94.113 OCULOMOTOR FUNCTION STUDY ABNORMALITY: ICD-10-CM

## 2018-07-17 DIAGNOSIS — H81.8X2 LEFT-SIDED VESTIBULAR WEAKNESS: ICD-10-CM

## 2018-07-17 DIAGNOSIS — D64.9 ANEMIA, UNSPECIFIED TYPE: Primary | ICD-10-CM

## 2018-07-17 LAB
ALBUMIN SERPL BCP-MCNC: 3.8 G/DL
ALP SERPL-CCNC: 62 U/L
ALT SERPL W/O P-5'-P-CCNC: 11 U/L
ANION GAP SERPL CALC-SCNC: 10 MMOL/L
AST SERPL-CCNC: 13 U/L
BASOPHILS # BLD AUTO: 0 K/UL
BASOPHILS NFR BLD: 0.6 %
BILIRUB SERPL-MCNC: 0.4 MG/DL
BUN SERPL-MCNC: 58 MG/DL
CALCIUM SERPL-MCNC: 11.3 MG/DL
CHLORIDE SERPL-SCNC: 104 MMOL/L
CO2 SERPL-SCNC: 25 MMOL/L
CREAT SERPL-MCNC: 6.3 MG/DL
DIFFERENTIAL METHOD: ABNORMAL
EOSINOPHIL # BLD AUTO: 0.3 K/UL
EOSINOPHIL NFR BLD: 4.3 %
ERYTHROCYTE [DISTWIDTH] IN BLOOD BY AUTOMATED COUNT: 14.1 %
EST. GFR  (AFRICAN AMERICAN): 9 ML/MIN/1.73 M^2
EST. GFR  (NON AFRICAN AMERICAN): 8 ML/MIN/1.73 M^2
GLUCOSE SERPL-MCNC: 119 MG/DL
HCT VFR BLD AUTO: 30.8 %
HGB BLD-MCNC: 10.1 G/DL
LYMPHOCYTES # BLD AUTO: 1.9 K/UL
LYMPHOCYTES NFR BLD: 26.4 %
MCH RBC QN AUTO: 30.7 PG
MCHC RBC AUTO-ENTMCNC: 32.7 G/DL
MCV RBC AUTO: 94 FL
MONOCYTES # BLD AUTO: 0.4 K/UL
MONOCYTES NFR BLD: 5.3 %
NEUTROPHILS # BLD AUTO: 4.5 K/UL
NEUTROPHILS NFR BLD: 63.4 %
PLATELET # BLD AUTO: 180 K/UL
PMV BLD AUTO: 8.2 FL
POTASSIUM SERPL-SCNC: 4.6 MMOL/L
PROT SERPL-MCNC: 7 G/DL
RBC # BLD AUTO: 3.28 M/UL
SODIUM SERPL-SCNC: 139 MMOL/L
WBC # BLD AUTO: 7.1 K/UL

## 2018-07-17 PROCEDURE — 36415 COLL VENOUS BLD VENIPUNCTURE: CPT | Mod: NTX

## 2018-07-17 PROCEDURE — 80053 COMPREHEN METABOLIC PANEL: CPT | Mod: NTX

## 2018-07-17 PROCEDURE — 85025 COMPLETE CBC W/AUTO DIFF WBC: CPT | Mod: NTX

## 2018-07-26 ENCOUNTER — OFFICE VISIT (OUTPATIENT)
Dept: TRANSPLANT | Facility: CLINIC | Age: 78
End: 2018-07-26
Payer: MEDICARE

## 2018-07-26 ENCOUNTER — LAB VISIT (OUTPATIENT)
Dept: LAB | Facility: HOSPITAL | Age: 78
End: 2018-07-26
Payer: MEDICARE

## 2018-07-26 VITALS
WEIGHT: 209.44 LBS | TEMPERATURE: 98 F | SYSTOLIC BLOOD PRESSURE: 116 MMHG | OXYGEN SATURATION: 100 % | DIASTOLIC BLOOD PRESSURE: 71 MMHG | RESPIRATION RATE: 16 BRPM | HEIGHT: 71 IN | BODY MASS INDEX: 29.32 KG/M2 | HEART RATE: 55 BPM

## 2018-07-26 DIAGNOSIS — N18.5 CHRONIC KIDNEY DISEASE, STAGE 5: ICD-10-CM

## 2018-07-26 DIAGNOSIS — R42 VERTIGO: Primary | ICD-10-CM

## 2018-07-26 DIAGNOSIS — I25.119 CORONARY ARTERY DISEASE INVOLVING NATIVE CORONARY ARTERY OF NATIVE HEART WITH ANGINA PECTORIS: ICD-10-CM

## 2018-07-26 DIAGNOSIS — Z76.82 ORGAN TRANSPLANT CANDIDATE: ICD-10-CM

## 2018-07-26 DIAGNOSIS — G89.29 CHRONIC BACK PAIN, UNSPECIFIED BACK LOCATION, UNSPECIFIED BACK PAIN LATERALITY: ICD-10-CM

## 2018-07-26 DIAGNOSIS — M54.9 CHRONIC BACK PAIN, UNSPECIFIED BACK LOCATION, UNSPECIFIED BACK PAIN LATERALITY: ICD-10-CM

## 2018-07-26 DIAGNOSIS — E65 ABDOMINAL OBESITY: ICD-10-CM

## 2018-07-26 DIAGNOSIS — I10 BENIGN ESSENTIAL HTN: ICD-10-CM

## 2018-07-26 DIAGNOSIS — M51.36 DDD (DEGENERATIVE DISC DISEASE), LUMBAR: ICD-10-CM

## 2018-07-26 DIAGNOSIS — I65.29 STENOSIS OF CAROTID ARTERY, UNSPECIFIED LATERALITY: ICD-10-CM

## 2018-07-26 LAB
ABO + RH BLD: NORMAL
ALBUMIN SERPL BCP-MCNC: 3.9 G/DL
ALP SERPL-CCNC: 70 U/L
ALT SERPL W/O P-5'-P-CCNC: 16 U/L
ANION GAP SERPL CALC-SCNC: 11 MMOL/L
APTT BLDCRRT: 22.2 SEC
AST SERPL-CCNC: 19 U/L
BASOPHILS # BLD AUTO: 0.04 K/UL
BASOPHILS NFR BLD: 0.7 %
BILIRUB DIRECT SERPL-MCNC: 0.2 MG/DL
BILIRUB SERPL-MCNC: 0.5 MG/DL
BLD GP AB SCN CELLS X3 SERPL QL: NORMAL
BUN SERPL-MCNC: 57 MG/DL
CALCIUM SERPL-MCNC: 10.9 MG/DL
CHLORIDE SERPL-SCNC: 102 MMOL/L
CHOLEST SERPL-MCNC: 89 MG/DL
CHOLEST/HDLC SERPL: 3.1 {RATIO}
CO2 SERPL-SCNC: 25 MMOL/L
COMPLEXED PSA SERPL-MCNC: 5.6 NG/ML
CREAT SERPL-MCNC: 6.8 MG/DL
DIFFERENTIAL METHOD: ABNORMAL
EOSINOPHIL # BLD AUTO: 0.4 K/UL
EOSINOPHIL NFR BLD: 7.4 %
ERYTHROCYTE [DISTWIDTH] IN BLOOD BY AUTOMATED COUNT: 14.3 %
EST. GFR  (AFRICAN AMERICAN): 8.2 ML/MIN/1.73 M^2
EST. GFR  (NON AFRICAN AMERICAN): 7.1 ML/MIN/1.73 M^2
GLUCOSE SERPL-MCNC: 86 MG/DL
HCT VFR BLD AUTO: 31.6 %
HDLC SERPL-MCNC: 29 MG/DL
HDLC SERPL: 32.6 %
HGB BLD-MCNC: 10.6 G/DL
HIV 1+2 AB+HIV1 P24 AG SERPL QL IA: NEGATIVE
IMM GRANULOCYTES # BLD AUTO: 0.02 K/UL
IMM GRANULOCYTES NFR BLD AUTO: 0.3 %
INR PPP: 1
LDLC SERPL CALC-MCNC: 46.4 MG/DL
LYMPHOCYTES # BLD AUTO: 2.1 K/UL
LYMPHOCYTES NFR BLD: 35.8 %
MCH RBC QN AUTO: 32 PG
MCHC RBC AUTO-ENTMCNC: 33.5 G/DL
MCV RBC AUTO: 96 FL
MONOCYTES # BLD AUTO: 0.4 K/UL
MONOCYTES NFR BLD: 7.1 %
NEUTROPHILS # BLD AUTO: 2.8 K/UL
NEUTROPHILS NFR BLD: 48.7 %
NONHDLC SERPL-MCNC: 60 MG/DL
NRBC BLD-RTO: 0 /100 WBC
PHOSPHATE SERPL-MCNC: 6.7 MG/DL
PLATELET # BLD AUTO: 160 K/UL
PMV BLD AUTO: 10.2 FL
POTASSIUM SERPL-SCNC: 4 MMOL/L
PROT SERPL-MCNC: 7.4 G/DL
PROTHROMBIN TIME: 10.3 SEC
PTH-INTACT SERPL-MCNC: 14 PG/ML
RBC # BLD AUTO: 3.31 M/UL
SODIUM SERPL-SCNC: 138 MMOL/L
TRIGL SERPL-MCNC: 68 MG/DL
WBC # BLD AUTO: 5.81 K/UL

## 2018-07-26 PROCEDURE — 36415 COLL VENOUS BLD VENIPUNCTURE: CPT | Mod: TXP

## 2018-07-26 PROCEDURE — 86704 HEP B CORE ANTIBODY TOTAL: CPT | Mod: TXP

## 2018-07-26 PROCEDURE — 82248 BILIRUBIN DIRECT: CPT | Mod: TXP

## 2018-07-26 PROCEDURE — 85730 THROMBOPLASTIN TIME PARTIAL: CPT | Mod: TXP

## 2018-07-26 PROCEDURE — 86803 HEPATITIS C AB TEST: CPT | Mod: TXP

## 2018-07-26 PROCEDURE — 84100 ASSAY OF PHOSPHORUS: CPT | Mod: TXP

## 2018-07-26 PROCEDURE — 86703 HIV-1/HIV-2 1 RESULT ANTBDY: CPT | Mod: TXP

## 2018-07-26 PROCEDURE — 86787 VARICELLA-ZOSTER ANTIBODY: CPT | Mod: TXP

## 2018-07-26 PROCEDURE — 99213 OFFICE O/P EST LOW 20 MIN: CPT | Mod: PBBFAC,TXP,25 | Performed by: INTERNAL MEDICINE

## 2018-07-26 PROCEDURE — 86706 HEP B SURFACE ANTIBODY: CPT | Mod: TXP

## 2018-07-26 PROCEDURE — 80061 LIPID PANEL: CPT | Mod: TXP

## 2018-07-26 PROCEDURE — 86825 HLA X-MATH NON-CYTOTOXIC: CPT | Mod: 91,PO,TXP

## 2018-07-26 PROCEDURE — 99205 OFFICE O/P NEW HI 60 MIN: CPT | Mod: S$PBB,GC,, | Performed by: INTERNAL MEDICINE

## 2018-07-26 PROCEDURE — 99999 PR PBB SHADOW E&M-EST. PATIENT-LVL III: CPT | Mod: PBBFAC,TXP,, | Performed by: INTERNAL MEDICINE

## 2018-07-26 PROCEDURE — 86644 CMV ANTIBODY: CPT | Mod: TXP

## 2018-07-26 PROCEDURE — 86480 TB TEST CELL IMMUN MEASURE: CPT | Mod: TXP

## 2018-07-26 PROCEDURE — 86828 HLA CLASS I&II ANTIBODY QUAL: CPT | Mod: 91,PO

## 2018-07-26 PROCEDURE — 83970 ASSAY OF PARATHORMONE: CPT | Mod: TXP

## 2018-07-26 PROCEDURE — 86592 SYPHILIS TEST NON-TREP QUAL: CPT | Mod: TXP

## 2018-07-26 PROCEDURE — 86829 HLA CLASS I/II ANTIBODY QUAL: CPT | Mod: PO

## 2018-07-26 PROCEDURE — 85025 COMPLETE CBC W/AUTO DIFF WBC: CPT | Mod: TXP

## 2018-07-26 PROCEDURE — 86901 BLOOD TYPING SEROLOGIC RH(D): CPT | Mod: TXP

## 2018-07-26 PROCEDURE — 87340 HEPATITIS B SURFACE AG IA: CPT | Mod: TXP

## 2018-07-26 PROCEDURE — 84153 ASSAY OF PSA TOTAL: CPT | Mod: TXP

## 2018-07-26 PROCEDURE — 86833 HLA CLASS II HIGH DEFIN QUAL: CPT | Mod: PO,TXP

## 2018-07-26 PROCEDURE — 80053 COMPREHEN METABOLIC PANEL: CPT | Mod: TXP

## 2018-07-26 PROCEDURE — 86832 HLA CLASS I HIGH DEFIN QUAL: CPT | Mod: PO,TXP

## 2018-07-26 PROCEDURE — 86825 HLA X-MATH NON-CYTOTOXIC: CPT | Mod: PO,TXP

## 2018-07-26 PROCEDURE — 85610 PROTHROMBIN TIME: CPT | Mod: TXP

## 2018-07-26 PROCEDURE — 99001 SPECIMEN HANDLING PT-LAB: CPT | Mod: PO

## 2018-07-26 NOTE — LETTER
July 26, 2018        Frankie Rojo  [address] [facility] [place] [address]  Phone: [phone]  Fax: [phone]             [facility]  [address]  [facility] [address]  Phone: [phone]   Patient: Micheal Hunt   MR Number: [id number]   YOB: 1940   Date of Visit: 7/26/2018       Dear Dr. Frankie Rojo    Thank you for referring Micheal Hunt to me for evaluation. Attached you will find relevant portions of my assessment and plan of care.    If you have questions, please do not hesitate to call me. I look forward to following Micheal Hunt along with you.    Sincerely,    Omaira Darby MD    Enclosure    If you would like to receive this communication electronically, please contact [email or web address] or [phone] to request [employer] access.    [employer] is a tool which provides read-only access to select patient information with whom you have a relationship. Its easy to use and provides real time access to review your patients record including encounter summaries, notes, results, and demographic information.    If you feel you have received this communication in error or would no longer like to receive these types of communications, please e-mail [email or web address]

## 2018-07-26 NOTE — PATIENT INSTRUCTIONS
From your doctor:  Thank you for visiting us today and considering kidney transplantation.    -You are not a transplant candidate due to your heart disease, age, imbalance/risk of falls and poor functional status.  -Talk to your doctor right lower abdominal pain  Please feel free to contact us with any questions or concerns.  Regards,  Dr. Mary mR

## 2018-07-26 NOTE — PROGRESS NOTES
Labs faxed    Notes recorded by Kiana Arciniega NP on 7/26/2018 at 12:08 PM CDT  Results reviewed, action needed.  Elevated PSA, please inform PT he needs to f/u with urology for further evaluation   Fax labs to dialysis/nephrologist

## 2018-07-26 NOTE — PROGRESS NOTES
Transplant Nephrology  Kidney Transplant Recipient Evaluation    Referring Physician: Frankie Rojo  Current Nephrologist: Frankie Rojo    Subjective:   CC:  Initial evaluation of kidney transplant candidacy.    HPI:  Mr. Hunt is a 77 y.o. year old    White   or  male who has presented to be evaluated as a potential kidney transplant recipient.  He has advanced kidney disease secondary to HTN.  Patient is currently pre-dialysis. He has a LUE AV fistula for dialysis access.     Previous Transplant: no    Past Medical and Surgical History: Mr. Hunt  has a past medical history of Allergy; Arthritis; BPH (benign prostatic hyperplasia); CAD (coronary artery disease); Chronic kidney disease; Colon polyp; Diverticulosis; Gastritis; GERD (gastroesophageal reflux disease); HTN (hypertension); Hyperlipidemia; LVH (left ventricular hypertrophy); Mesenteric ischemia; Murmur, cardiac; GRICELDA (obstructive sleep apnea); Sinus problem; and Syncope and collapse.  He has a past surgical history that includes Shoulder surgery; rotative cuff; mid leftt finger; Appendectomy; Cardiac catheterization; Coronary artery bypass graft (4/2007); Colonoscopy (2011); Joint replacement; Mole removal (2016); and Colonoscopy (N/A, 5/3/2018).    Past Social and Family History: Mr. Hunt reports that he has never smoked. He has never used smokeless tobacco. He reports that he does not drink alcohol or use drugs. His family history includes Cancer in his father; Heart disease in his mother and sister; Hypertension in his brother; Pneumonia in his sister; Stroke in his sister; Sudden death in his father.    Review of Systems   Constitutional: Negative for fever and unexpected weight change.   Respiratory: Positive for shortness of breath (relieved by one puff of albuterol). Negative for cough, chest tightness and wheezing.    Cardiovascular: Negative for chest pain and leg swelling.   Gastrointestinal: Positive  "for abdominal pain. Negative for abdominal distention, diarrhea and nausea.   Endocrine: Negative for polydipsia.   Genitourinary: Negative for difficulty urinating, dysuria, flank pain, frequency and hematuria.   Musculoskeletal: Negative for arthralgias and myalgias.   Skin: Negative for rash.   Allergic/Immunologic: Negative for immunocompromised state.   Neurological: Positive for light-headedness. Negative for dizziness.        +vertigo   Hematological: Negative for adenopathy.   Psychiatric/Behavioral: Negative for confusion.       Objective:   Blood pressure 116/71, pulse (!) 55, temperature 97.6 °F (36.4 °C), temperature source Oral, resp. rate 16, height 5' 10.87" (1.8 m), weight 95 kg (209 lb 7 oz), SpO2 100 %.body mass index is 29.32 kg/m².    Physical Exam   Constitutional: He is oriented to person, place, and time.   HENT:   Head: Normocephalic and atraumatic.   Eyes: Conjunctivae and EOM are normal.   Neck: No JVD present. No tracheal deviation present.   Cardiovascular: Normal rate and regular rhythm.  Exam reveals no friction rub.    Pulmonary/Chest: Effort normal and breath sounds normal.   Abdominal: Soft. He exhibits no distension. There is tenderness (RLQ when laying down). There is no rebound and no guarding.   Musculoskeletal: He exhibits no edema.   Neurological: He is alert and oriented to person, place, and time.   Skin: Skin is warm and dry. No rash noted.   Psychiatric: He has a normal mood and affect. Judgment and thought content normal.     Labs:  Lab Results   Component Value Date    WBC 5.81 07/26/2018    HGB 10.6 (L) 07/26/2018    HCT 31.6 (L) 07/26/2018     07/26/2018    K 4.0 07/26/2018     07/26/2018    CO2 25 07/26/2018    BUN 57 (H) 07/26/2018    CREATININE 6.8 (H) 07/26/2018    EGFRNONAA 7.1 (A) 07/26/2018    GLUCOSE 109 08/08/2013    CALCIUM 10.9 (H) 07/26/2018    PHOS 6.7 (H) 07/26/2018    MG 2.3 06/16/2018    ALBUMIN 3.9 07/26/2018    AST 19 07/26/2018    ALT 16 " 07/26/2018    UTPCR 1.79 (H) 06/29/2018    PTH 14.0 07/26/2018       Lab Results   Component Value Date    BILIRUBINUA Negative 06/14/2018    AMYLASE 82 09/10/2015    LIPASE 93 (H) 06/14/2018    PROTEINUA 3+ (A) 06/14/2018    NITRITE Negative 06/14/2018    RBCUA 3 06/14/2018    WBCUA 4 06/14/2018     Labs were reviewed with the patient.    Assessment:     1. Vertigo    2. Coronary artery disease involving native coronary artery of native heart with angina pectoris    3. Benign essential HTN    4. Stenosis of carotid artery, unspecified laterality    5. Chronic kidney disease, stage 5    6. Chronic back pain, unspecified back location, unspecified back pain laterality    7. BMI 29.0-29.9,adult    8. Abdominal obesity    9. DDD (degenerative disc disease), lumbar        Plan:     Transplant Candidacy:   Based on available information, Mr. Hunt is an unacceptable kidney transplant candidate d/t heart disease, age, imbalance/risk of falls and poor functional status.  Final determination of transplant candidacy will be made once workup is complete and reviewed by the selection committee.    Kevin Trejo MD       [facility] Patient Status  Functional Status: 60% - Requires occasional assistance but is able to care for needs  Physical Capacity: Limited Mobility       STAFF:  Micheal Hunt was discussed  with Dr. Trejo as outlined.  I have personally  interviewed and evaluated him, reviewed the information in this note, and agree with the findings listed in the attached encounter.  I explained to Micheal and his ex-wife that he is not a suitable transplant candidate given his advanced age and comorbidities noted above.  He was encouraged to pursue physical therapy and strengthening to improve his balance and avoid falls.  Also, since on exam he had some abdominal discomfort, I encouraged him to get this followed up by his physician if the pain continues.   He and his ex wife expressed understanding and agreement of  this plan.  He understood after attending the education session that he was not a transplant candidate, and a ride that decision on his own accord, prior to my visit.

## 2018-07-26 NOTE — PROGRESS NOTES
PHARM.D. PRE-TRANSPLANT NOTE:    This patient's medication therapy was evaluated as part of his pre-transplant evaluation.    The following pharmacologic concerns were noted: aspirin, colchicine, sulfa allergy (doesn't remember the exact reaction, has been many years since he took it)      Current Outpatient Prescriptions   Medication Sig Dispense Refill    albuterol 90 mcg/actuation inhaler 2 puffs every 4 hours as needed for cough, wheeze, or shortness of breath 3 Inhaler 3    albuterol-ipratropium 2.5mg-0.5mg/3mL (DUO-NEB) 0.5 mg-3 mg(2.5 mg base)/3 mL nebulizer solution INHALE 1 VIAL BY NEBULIZER EVERY 6 HOURS AS NEEDED FOR WHEEZING 270 mL 0    allopurinol (ZYLOPRIM) 100 MG tablet Take 1 tablet (100 mg total) by mouth once daily. 90 tablet 1    amLODIPine (NORVASC) 2.5 MG Tab Take 1 tablet (2.5 mg total) by mouth every evening. 903 tablet 1    aspirin (ECOTRIN) 81 MG EC tablet Take 81 mg by mouth once daily.        atorvastatin (LIPITOR) 80 MG tablet TAKE 1 TABLET (80 MG TOTAL) BY MOUTH ONCE DAILY. 90 tablet 3    carvedilol (COREG) 6.25 MG tablet TAKE 1 TABLET (6.25 MG TOTAL) BY MOUTH 2 (TWO) TIMES DAILY WITH MEALS. 90 tablet 3    COLCRYS 0.6 mg tablet TAKE 1 TABLET (0.6 MG TOTAL) BY MOUTH ONCE DAILY.  11    cyanocobalamin, vitamin B-12, (VITAMIN B-12) 1,000 mcg Subl Take 1 tablet by mouth daily as needed. Takes 3,000mcg      finasteride (PROSCAR) 5 mg tablet TAKE 1 TABLET ONCE DAILY. 90 tablet 3    fluticasone (FLONASE) 50 mcg/actuation nasal spray 2 sprays (100 mcg total) by Each Nare route once daily. (Patient taking differently: 2 sprays by Each Nare route daily as needed. ) 3 Bottle 3    gabapentin (NEURONTIN) 100 MG capsule Take 1 capsule (100 mg total) by mouth every evening. 90 capsule 0    isosorbide mononitrate (ISMO,MONOKET) 10 mg tablet Take 1 tablet (10 mg total) by mouth every evening. 30 tablet 3    NITROSTAT 0.4 mg SL tablet PLACE 1 TABLET (0.4 MG TOTAL) UNDER THE TONGUE EVERY 5  (FIVE) MINUTES AS NEEDED FOR CHEST PAIN. 25 tablet 3    sodium chloride (SALINE NASAL MIST) 3 % Mist 1 spray by Nasal route 2 (two) times daily.      tamsulosin (FLOMAX) 0.4 mg Cp24 Take 1 capsule (0.4 mg total) by mouth once daily. 30 capsule 11    valsartan (DIOVAN) 320 MG tablet Take 1 tablet (320 mg total) by mouth once daily. 90 tablet 3    zolpidem (AMBIEN) 5 MG Tab TAKE 1 TABLET (5 MG TOTAL) BY MOUTH NIGHTLY AS NEEDED. 30 tablet 5     No current facility-administered medications for this visit.          Currently Mr Hutn is responsible for preparing / administering this patient's medications on a daily basis.  I am available for consultation and can be contacted, as needed by the other members of the Kidney Transplant team.

## 2018-07-26 NOTE — PROGRESS NOTES
CLINIC TEACHING NOTE    Micheal Hunt was seen in transplant clinic today.  Coordinator verified that the patient viewed the teaching video and received written educational material.    The following information was reviewed:  · Waiting list time for cadaveric vs. living donation  · Waiting list process including: back-up calls, notification of changes in contact information, dialysis/physician information to the transplant coordinator, notifications of changes in health or if hospitalized, and notification of travel out of the area  · Monthly antibody testing to be obtained by the dialysis unit or arranged through a local lab and mailed to the transplant center.  Patient informed that if blood is not sent monthly and a kidney becomes available, the patient will need to come to the hospital to provide a fresh sample of blood for testing.    Patient was instructed that once on the waiting list, an appointment with the transplant physician will be scheduled yearly or every 6 months to ensure patient remains an acceptable transplant candidate.    Patient also informed that at the time of transplant, participation in a research protocol may be offered.  Notified that this is strictly voluntary and will not affect the quality of care received.      The option to have name placed on multiple transplant lists to increase opportunity for transplant was reviewed.    All patient questions were answered.  Patient verbalized understanding of above information.    Patient presents as a suitable candidate for transplant.

## 2018-07-27 ENCOUNTER — COMMITTEE REVIEW (OUTPATIENT)
Dept: TRANSPLANT | Facility: CLINIC | Age: 78
End: 2018-07-27

## 2018-07-27 LAB
CMV IGG SERPL QL IA: REACTIVE
HBV CORE AB SERPL QL IA: POSITIVE
HBV SURFACE AG SERPL QL IA: NEGATIVE
HCV AB SERPL QL IA: NEGATIVE
HEP. B SURF AB, QUAL: POSITIVE
HEP. B SURF AB, QUANT.: 189 MIU/ML
MITOGEN IGNF BCKGRD COR BLD-ACNC: 0.03 IU/ML
MITOGEN NIL: >10 IU/ML
RPR SER QL: NORMAL
STRONGYLOIDES ANTIBODY IGG: NEGATIVE
TB GOLD PLUS: NEGATIVE
TB1 - NIL: 0.02 IU/ML
TB2 - NIL: 0.01 IU/ML

## 2018-07-27 NOTE — COMMITTEE REVIEW
Native Organ Dx: Hypertensive Nephrosclerosis      Not approved for LRD/CAD transplant due to heart disease, high risk for falls, decreased functional status, and advanced age with extended wait time on the kidney transplant list.     Nurse spoke with patient and informed him of the committee's decision.Patient was also informed of elevated PSA and need for urology consult. Patient verbalized understanding of the above information.      Note written by: Carlos Eduardo Heaton RN    ===============================================    I was present at the meeting and attest to the decision of the committee

## 2018-07-27 NOTE — LETTER
July 27, 2018    Micheal Hunt  [address]  [place] [address]        Dear Micheal Hunt:  MRN: [id number]    It is the duty of the [facility] Selection Committee to determine which patients are candidates for a transplant. For this reason, our committee has the difficult task of evaluating patients to determine which ones have the greatest chance of having a successful transplant. We are aware of the magnitude of this responsibility, and we approach it with reverence and humility.    It is with regret I inform you that you are not approved as a transplant candidate due to poor functional status, history of heart disease, high risk for falls and advanced age with extended wait time on the kidney transplant list. Urology consult recommended due elevated PSA. Based on this review, we have determined that at this time, you are not a candidate for a transplant at [facility].      The selection committee carefully considers each patient's transplant candidacy and determines whether it is safe to proceed with transplantation on a case-by-case basis using established selection criteria.  At present, the risk of proceeding with an elective transplant surgery has become too high.                                                                               Although the selection committee believes you are not a suitable transplant candidate, you have the option to be evaluated at other transplant centers who may have different selection criteria.  You may request your [facility] records be sent to any center of your choice by contacting our Medical Records Department at [phone].                                                                               Attached is a letter from the [employer] for Organ Sharing (UNOS).  It describes the services and information offered to patients by UNOS and the Organ Procurement and Transplant Network.    The [facility] Selection Committee sincerely  wishes you the best and remains available to answer any questions.  Please do not hesitate to contact our pre-transplant office if we can assist you in any other way.                                                                               Sincerely,      Omaira Sanchez MD  Medical Director, Kidney & Kidney/Pancreas Transplantation    Faxed to:  Frankie Rojo MD    Encl: UNOS Letter          OPTN/UNOS: Your Resource for Organ Transplant Information        If you have a question regarding your own medical care, you always should call your transplant center first. However, for general organ transplant-related information, you can call the [employer] Organ [employer]aring (UNOS) toll-free patient services line at [phone].    Anyone, including potential transplant candidates, recipients, family members/friends, living donors, and/or donor family members can call this number to:    · talk about organ donation, living donation, how transplant and donation work, the donation process, transplant policies, and transplant/donor information;  · get a free patient information kit with helpful booklets, waiting list and transplant information, and a list of all transplant centers;  · ask questions about the Organ Procurement and Transplantation Network (OPTN) web site (www.optn.transplant.hrsa.gov); the UNOS Web site (www.unos.org); or the UNOS web site for living donors and transplant recipients (www.transplantliving.org);  · learn how UNOS and the OPTN can help you;  · talk about any concerns that you may have with a transplant center and how they perform    UNOS is a not-for-profit organization that provides all of the administrative services for the national OPTN under federal contract to the Health Resources and Services Administration (HRSA), an agency under the U.S. Department of Health and Human Services (HHS).     UNOS and OPTN responsibilities include:    · writing educational material for  patients, the public and professionals;  · helping to make people aware of the need for donated organs and tissue;  · writing organ transplant policy with help from doctors, nurses, transplant patients/candidates, donor families, living donors, and the public;  · coordinating the organ matching and placement process;  · collecting information about every organ transplant and donation that occurs in the [place].    Remember, you should contact your transplant center directly if you have questions or concerns about your own medical care including medical records, work-up progress and test reports. [facility] is not your transplant center, and staff at [facility] will not be able to transfer you to your transplant center, so keep your transplant centers phone number handy. But, while you research your transplant needs and learn as much as you can about transplantation and donation, we welcome your call to our toll-free patient services line at [phone].

## 2018-07-30 ENCOUNTER — TELEPHONE (OUTPATIENT)
Dept: PHYSICAL MEDICINE AND REHAB | Facility: CLINIC | Age: 78
End: 2018-07-30

## 2018-07-30 ENCOUNTER — TELEPHONE (OUTPATIENT)
Dept: HEMATOLOGY/ONCOLOGY | Facility: CLINIC | Age: 78
End: 2018-07-30

## 2018-07-30 LAB
VARICELLA INTERPRETATION: POSITIVE
VARICELLA ZOSTER IGG: 3.98 ISR

## 2018-07-30 NOTE — TELEPHONE ENCOUNTER
Patient is complaining of knee pain after Synvisc injection.  He states he is wearing a brace and it is helping but would like to know if you can recommend another brace.  He got his brace at the drug store.

## 2018-07-30 NOTE — TELEPHONE ENCOUNTER
----- Message from Cecille Hess sent at 7/27/2018  3:01 PM CDT -----  Contact: Pt  Name of Who is Calling: Micheal      What is the request in detail: Pt is calling requesting to speak with a nurse      Can the clinic reply by [facility]: no      What Number to [facility] Back [facility]: .[phone] (home)

## 2018-07-30 NOTE — TELEPHONE ENCOUNTER
----- Message from Nadira Morillo sent at 7/30/2018  8:56 AM CDT -----  Contact: Self  Patient needs to speak to the nurse about his left knee pain     Please call back [phone]

## 2018-07-30 NOTE — TELEPHONE ENCOUNTER
Message given to patient, offered an appt for tomorrow 7/31/18, but patient states he has appts at main campus.  appt has been scheduled for 8/6/18

## 2018-07-30 NOTE — TELEPHONE ENCOUNTER
----- Message from Jose Nix sent at 7/30/2018 12:35 PM CDT -----  Contact: self   Patient need to speak with a nurse regarding a date for his shot that he receives every week. Please call back at [phone] (home)

## 2018-07-30 NOTE — TELEPHONE ENCOUNTER
Patient notified that he has had his 2 shots that were ordered and will see Dr. Wells in September. Patient verbalized understanding.

## 2018-07-31 LAB — HLA DNA FREEZE AND HOLD INTERPRETATION: NORMAL

## 2018-08-01 LAB
B CELL RESULTS - XM AUTO: NEGATIVE
B MCS AVERAGE - XM AUTO: -18
FXMAU TESTING DATE: NORMAL
HLA AB QL: NEGATIVE
HLA AB SERPL: POSITIVE
HLATY INTERPRETATION: NORMAL
SCRFL TESTING DATE: NORMAL
SERUM COLLECTION DT - XM AUTO: NORMAL
SERUM COLLECTION DT: NORMAL
T CELL RESULTS - XM AUTO: NEGATIVE
T MCS AVERAGE - XM AUTO: -11

## 2018-08-02 ENCOUNTER — CLINICAL SUPPORT (OUTPATIENT)
Dept: AUDIOLOGY | Facility: CLINIC | Age: 78
End: 2018-08-02
Payer: MEDICARE

## 2018-08-02 DIAGNOSIS — H81.392 PERIPHERAL VERTIGO, LEFT: Primary | ICD-10-CM

## 2018-08-02 DIAGNOSIS — H81.92 PERIPHERAL VESTIBULOPATHY OF LEFT EAR: Primary | ICD-10-CM

## 2018-08-02 PROCEDURE — 92540 BASIC VESTIBULAR EVALUATION: CPT | Mod: PBBFAC | Performed by: AUDIOLOGIST-HEARING AID FITTER

## 2018-08-02 PROCEDURE — 92548 CDP-SOT 6 COND W/I&R: CPT | Mod: PBBFAC | Performed by: AUDIOLOGIST-HEARING AID FITTER

## 2018-08-02 PROCEDURE — 92540 BASIC VESTIBULAR EVALUATION: CPT | Mod: 26,S$PBB,, | Performed by: AUDIOLOGIST-HEARING AID FITTER

## 2018-08-02 PROCEDURE — 92537 CALORIC VSTBLR TEST W/REC: CPT | Mod: 26,S$PBB,, | Performed by: AUDIOLOGIST-HEARING AID FITTER

## 2018-08-02 PROCEDURE — 92537 CALORIC VSTBLR TEST W/REC: CPT | Mod: PBBFAC | Performed by: AUDIOLOGIST-HEARING AID FITTER

## 2018-08-02 PROCEDURE — 92548 CDP-SOT 6 COND W/I&R: CPT | Mod: 26,S$PBB,, | Performed by: AUDIOLOGIST-HEARING AID FITTER

## 2018-08-02 NOTE — PROGRESS NOTES
VNG/Postuography Evaluation    Referring provider:  Brooke Chahal N.P.    77 y.o. male complains of vertigo, lightheadedness, double vision with head turns and imbalance.  Symptoms are typically constant but can be provoked by bending over and looking up and have been recurring over the past year. Mr. Hunt reported his most recent episode occurred about a week ago. Mr. Hunt reported his episodes will sometimes last for several days. He denied taking any medications that would effect today's testing    Gaze nystagmus was absent.    Oculomotor function was borderline abnormal for saccadic testing and abnormal for OPK testing.     Spontaneous nystagmus was absent.    The head-hanging left Hallpike was negative.    The head-hanging right Hallpike was negative.    Static positional nystagmus was absent.    Unilateral weakness: 38% (left)  Directional preponderance 24% (right beating)    RC: 7 d/s   d/s  RW: 13 d/s  LW: 4 d/s    Fixation suppression was normal.    Sensory Organization Test was normal.  Motor Control Test was abnormal for forward translation latency.    Impression: Mixed abnormality. Left peripheral weakness plus possible central oculomotor dysfunction.    Recommendations: Trial period with Cawthorne exercises or formal vestibular rehabilitation. Mr. Hunt was given a copy of the Cawthorne exercises to try at home. May also consider a Neurology consult to assess central oculomotor function.

## 2018-08-02 NOTE — Clinical Note
Bobby Cox,  Here are the balance lab results on your patient. Please let me know if you have any questions!   Thanks, Latasha

## 2018-08-06 ENCOUNTER — OFFICE VISIT (OUTPATIENT)
Dept: PHYSICAL MEDICINE AND REHAB | Facility: CLINIC | Age: 78
End: 2018-08-06
Payer: MEDICARE

## 2018-08-06 VITALS
HEIGHT: 71 IN | WEIGHT: 209 LBS | DIASTOLIC BLOOD PRESSURE: 69 MMHG | BODY MASS INDEX: 29.26 KG/M2 | HEART RATE: 57 BPM | SYSTOLIC BLOOD PRESSURE: 156 MMHG

## 2018-08-06 DIAGNOSIS — M17.12 PRIMARY OSTEOARTHRITIS OF LEFT KNEE: Primary | ICD-10-CM

## 2018-08-06 LAB
CLASS I ANTIBODIES - LUMINEX: NORMAL
CLASS II ANTIBODIES - LUMINEX: NORMAL
CLASS II ANTIBODY COMMENTS - LUMINEX: NORMAL
CPRA %: 0
SERUM COLLECTION DT - LUMINEX CLASS I: NORMAL
SERUM COLLECTION DT - LUMINEX CLASS II: NORMAL
SPCL1 TESTING DATE: NORMAL
SPCL2 TESTING DATE: NORMAL
SPCLU TESTING DATE: NORMAL

## 2018-08-06 PROCEDURE — 64450 NJX AA&/STRD OTHER PN/BRANCH: CPT | Mod: S$PBB,LT,, | Performed by: PHYSICAL MEDICINE & REHABILITATION

## 2018-08-06 PROCEDURE — 76942 ECHO GUIDE FOR BIOPSY: CPT | Mod: PBBFAC,PN | Performed by: PHYSICAL MEDICINE & REHABILITATION

## 2018-08-06 PROCEDURE — 76942 ECHO GUIDE FOR BIOPSY: CPT | Mod: 26,S$PBB,, | Performed by: PHYSICAL MEDICINE & REHABILITATION

## 2018-08-06 PROCEDURE — 64450 NJX AA&/STRD OTHER PN/BRANCH: CPT | Mod: PBBFAC,PN | Performed by: PHYSICAL MEDICINE & REHABILITATION

## 2018-08-06 PROCEDURE — 99214 OFFICE O/P EST MOD 30 MIN: CPT | Mod: PBBFAC,PN,25 | Performed by: PHYSICAL MEDICINE & REHABILITATION

## 2018-08-06 PROCEDURE — 99214 OFFICE O/P EST MOD 30 MIN: CPT | Mod: 25,S$PBB,, | Performed by: PHYSICAL MEDICINE & REHABILITATION

## 2018-08-06 PROCEDURE — 99999 PR PBB SHADOW E&M-EST. PATIENT-LVL IV: CPT | Mod: PBBFAC,,, | Performed by: PHYSICAL MEDICINE & REHABILITATION

## 2018-08-06 NOTE — PROGRESS NOTES
HPI:  Patient is a 77 y.o. year old male s/p left tkr w. Continued knee pain. In past left saphenous nerve block+hydrodissection has helped, but it did not help last time procedure was done.    Imaging  Findings: Left knee arthroplasty changes are identified. Components project in stable position without radiographically apparent hardware loosening. Small left knee joint effusion noted. There is enthesophyte formation around the patella. Diffuse atherosclerosis is noted.    No acute fracture or dislocation is seen.      Impression       1.  Stable appearance of left knee arthroplasty without radiographically apparent complication.  2. Small left knee joint effusion.     Labs  egfr 82, cr 6.8  lft's nl      Past Medical History:   Diagnosis Date    Allergy     Arthritis     Gout    BPH (benign prostatic hyperplasia)     CAD (coronary artery disease) 2006    Chronic kidney disease     due to ibuprofen    Colon polyp     Diverticulosis     Gastritis     GERD (gastroesophageal reflux disease)     HTN (hypertension) 3/27/2012    Hyperlipidemia     LVH (left ventricular hypertrophy)     Mesenteric ischemia     Murmur, cardiac 3/27/2012    GRICELDA (obstructive sleep apnea)     DOES NOT USE A MACHINE    Sinus problem     Syncope and collapse      Past Surgical History:   Procedure Laterality Date    APPENDECTOMY      CARDIAC CATHETERIZATION      COLONOSCOPY  2011    COLONOSCOPY N/A 5/3/2018    Procedure: COLONOSCOPY;  Surgeon: Messi Harris MD;  Location: [facility];  Service: Endoscopy;  Laterality: N/A;    CORONARY ARTERY BYPASS GRAFT  4/2007    x 1    JOINT REPLACEMENT      left knee total replacement  X 3    mid leftt finger      from a cactuss    MOLE REMOVAL  2016    rotative cuff      no rotative cuffs on bilat shoulders has pins     SHOULDER SURGERY      shoulder surgery bilat  RIGHT X 4; LEFT X 3     Family History   Problem Relation Age of Onset    Heart disease Mother     Sudden death  Father     Cancer Father         advanced lung ca- found after 2 story fall    Stroke Sister     Pneumonia Sister          from PNA    Heart disease Sister     Hypertension Brother     Kidney cancer Neg Hx     Prostate cancer Neg Hx     Urolithiasis Neg Hx     Allergic rhinitis Neg Hx     Allergies Neg Hx     Angioedema Neg Hx     Asthma Neg Hx     Atopy Neg Hx     Eczema Neg Hx     Immunodeficiency Neg Hx     Rhinitis Neg Hx     Urticaria Neg Hx      Social History     Social History    Marital status:      Spouse name: N/A    Number of children: N/A    Years of education: N/A     Social History Main Topics    Smoking status: Never Smoker    Smokeless tobacco: Never Used    Alcohol use No    Drug use: No    Sexual activity: Not on file     Other Topics Concern    Not on file     Social History Narrative    [place]; daughter nearby       Review of patient's allergies indicates:   Allergen Reactions    Ace inhibitors Other (See Comments)     Cough    Arb-angiotensin receptor antagonist Itching    Eplerenone Other (See Comments)     Marked bradycardia, 40, tiredness and weakness      Sulfa (sulfonamide antibiotics) Itching     Patient says this was 10 years ago and doesn't remember what happened       Current Outpatient Prescriptions:     albuterol 90 mcg/actuation inhaler, 2 puffs every 4 hours as needed for cough, wheeze, or shortness of breath, Disp: 3 Inhaler, Rfl: 3    albuterol-ipratropium 2.5mg-0.5mg/3mL (DUO-NEB) 0.5 mg-3 mg(2.5 mg base)/3 mL nebulizer solution, INHALE 1 VIAL BY NEBULIZER EVERY 6 HOURS AS NEEDED FOR WHEEZING, Disp: 270 mL, Rfl: 0    allopurinol (ZYLOPRIM) 100 MG tablet, Take 1 tablet (100 mg total) by mouth once daily., Disp: 90 tablet, Rfl: 1    amLODIPine (NORVASC) 2.5 MG Tab, Take 1 tablet (2.5 mg total) by mouth every evening., Disp: 903 tablet, Rfl: 1    aspirin (ECOTRIN) 81 MG EC tablet, Take 81 mg by mouth once daily.  , Disp: ,  Rfl:     atorvastatin (LIPITOR) 80 MG tablet, TAKE 1 TABLET (80 MG TOTAL) BY MOUTH ONCE DAILY., Disp: 90 tablet, Rfl: 3    carvedilol (COREG) 6.25 MG tablet, TAKE 1 TABLET (6.25 MG TOTAL) BY MOUTH 2 (TWO) TIMES DAILY WITH MEALS., Disp: 90 tablet, Rfl: 3    COLCRYS 0.6 mg tablet, TAKE 1 TABLET (0.6 MG TOTAL) BY MOUTH ONCE DAILY., Disp: , Rfl: 11    cyanocobalamin, vitamin B-12, (VITAMIN B-12) 1,000 mcg Subl, Take 1 tablet by mouth daily as needed. Takes 3,000mcg, Disp: , Rfl:     finasteride (PROSCAR) 5 mg tablet, TAKE 1 TABLET ONCE DAILY., Disp: 90 tablet, Rfl: 3    fluticasone (FLONASE) 50 mcg/actuation nasal spray, 2 sprays (100 mcg total) by Each Nare route once daily. (Patient taking differently: 2 sprays by Each Nare route daily as needed. ), Disp: 3 Bottle, Rfl: 3    gabapentin (NEURONTIN) 100 MG capsule, Take 1 capsule (100 mg total) by mouth every evening., Disp: 90 capsule, Rfl: 0    isosorbide mononitrate (ISMO,MONOKET) 10 mg tablet, Take 1 tablet (10 mg total) by mouth every evening., Disp: 30 tablet, Rfl: 3    NITROSTAT 0.4 mg SL tablet, PLACE 1 TABLET (0.4 MG TOTAL) UNDER THE TONGUE EVERY 5 (FIVE) MINUTES AS NEEDED FOR CHEST PAIN., Disp: 25 tablet, Rfl: 3    sodium chloride (SALINE NASAL MIST) 3 % Mist, 1 spray by Nasal route 2 (two) times daily., Disp: , Rfl:     tamsulosin (FLOMAX) 0.4 mg Cp24, Take 1 capsule (0.4 mg total) by mouth once daily., Disp: 30 capsule, Rfl: 11    valsartan (DIOVAN) 320 MG tablet, Take 1 tablet (320 mg total) by mouth once daily., Disp: 90 tablet, Rfl: 3    zolpidem (AMBIEN) 5 MG Tab, TAKE 1 TABLET (5 MG TOTAL) BY MOUTH NIGHTLY AS NEEDED., Disp: 30 tablet, Rfl: 5        Review of Systems  No nausea, vomiting, fevers, Chills , contipation, diarrhea or sweats      Physical Exam:      Vitals:    08/06/18 1244   BP: (!) 156/69   Pulse: (!) 57     alert and oriented ×4 follows commands answers all questions appropriately  Manual muscle test 5 out of 5 sensation to  light touch grossly intact  Healed incision scar left knee  No knee laxity  Antalgic gait  No knee effusion no clubbing cyanosis or edema     Assessment:  Left saphenous neuralgia s/p TKR    Assessment:  Repeat left saphenous nerve block+hydrodissection as it helped in past  Knee brace prn pain-prescription issued      pROCEDURE NOTE: risk and benefit of left sAPHENOUS NERVE BLOCK AND HYDRODISEECTION injection reviewed with. patient. Verbal consent was obtained. Injection was performed after sterile prep w. Betadine +cholorohexedine. MEDIAL approach used. ALONG  EDGE OF VASTUS MEDIALIS MUSCLE. 25g 2 inch needle used hydrodissecting along vastus medialis facial plane   Ultrasound guidance was utilized for needle visualization. A TOTAL OF 5ML OF LIDOCAINE 1% AND 5ML OF STERILE NORMAL SALINE WAS UTILIZED. No complications. iMMEDIATE IMPROVEMENT OF KNEE PAIN POST PROCEDURE     Ultrasound interpretation was performed prior to the procedure to identify the target and any adjacent neurovascular structures.  Subsequently, interpretation was performed during real- time needle guidance confirming placement. Post- intervention interpretation was also performed confirming appropriate injectate flow and hemostasis.  Images indicating needle placement have been saved in [employer].

## 2018-08-09 ENCOUNTER — TELEPHONE (OUTPATIENT)
Dept: OTOLARYNGOLOGY | Facility: CLINIC | Age: 78
End: 2018-08-09

## 2018-08-09 ENCOUNTER — TELEPHONE (OUTPATIENT)
Dept: FAMILY MEDICINE | Facility: CLINIC | Age: 78
End: 2018-08-09

## 2018-08-09 ENCOUNTER — PATIENT MESSAGE (OUTPATIENT)
Dept: OTOLARYNGOLOGY | Facility: CLINIC | Age: 78
End: 2018-08-09

## 2018-08-09 ENCOUNTER — PATIENT MESSAGE (OUTPATIENT)
Dept: FAMILY MEDICINE | Facility: CLINIC | Age: 78
End: 2018-08-09

## 2018-08-09 NOTE — TELEPHONE ENCOUNTER
----- Message from Danilo Philip sent at 8/9/2018  7:54 AM CDT -----  Contact: Dilcia/Tonya Castaneda called in regarding the attached patient (dad). Tonya would like to request Vestibular rehab as soon as possible.  Tonya would also like to request a referral to neurology as soon as possible to assess the Oculomopor function.    Tonya's call back number is [phone]

## 2018-08-09 NOTE — TELEPHONE ENCOUNTER
----- Message from Danilo Philip sent at 8/9/2018  7:56 AM CDT -----  Contact: ana/Tonya Castaneda called in regarding the attached patient (dad). Tonya would like to request Vestibular rehab as soon as possible.  Tonya would also like to request a referral to neurology as soon as possible to assess the Oculomopor function.    Tonya's call back number is [phone]

## 2018-08-09 NOTE — TELEPHONE ENCOUNTER
----- Message from Darlene Benson sent at 8/9/2018  7:04 AM CDT -----  Type:  Same Day Appointment Request    Caller is requesting a same day appointment.  Caller declined first available appointment listed below.      Name of Caller:  Self   When is the first available appointment?  No future appointments  Symptoms:  Diverticulitis flare up  Best Call Back Number:  [phone]  Additional Information:   Patient asking to be seen today

## 2018-08-10 ENCOUNTER — TELEPHONE (OUTPATIENT)
Dept: CARDIOLOGY | Facility: CLINIC | Age: 78
End: 2018-08-10

## 2018-08-10 ENCOUNTER — CLINICAL SUPPORT (OUTPATIENT)
Dept: REHABILITATION | Facility: HOSPITAL | Age: 78
End: 2018-08-10
Payer: MEDICARE

## 2018-08-10 ENCOUNTER — OFFICE VISIT (OUTPATIENT)
Dept: CARDIOLOGY | Facility: CLINIC | Age: 78
End: 2018-08-10
Payer: MEDICARE

## 2018-08-10 VITALS
WEIGHT: 213.63 LBS | HEIGHT: 73 IN | OXYGEN SATURATION: 99 % | HEART RATE: 55 BPM | SYSTOLIC BLOOD PRESSURE: 148 MMHG | BODY MASS INDEX: 28.31 KG/M2 | DIASTOLIC BLOOD PRESSURE: 67 MMHG

## 2018-08-10 DIAGNOSIS — R42 DIZZINESS: Primary | ICD-10-CM

## 2018-08-10 DIAGNOSIS — K57.32 SIGMOID DIVERTICULITIS: ICD-10-CM

## 2018-08-10 DIAGNOSIS — I25.10 CORONARY ARTERY DISEASE INVOLVING NATIVE CORONARY ARTERY OF NATIVE HEART WITHOUT ANGINA PECTORIS: Primary | ICD-10-CM

## 2018-08-10 DIAGNOSIS — I51.9 LV DYSFUNCTION: ICD-10-CM

## 2018-08-10 DIAGNOSIS — E78.00 PURE HYPERCHOLESTEROLEMIA: ICD-10-CM

## 2018-08-10 DIAGNOSIS — I10 ESSENTIAL HYPERTENSION: ICD-10-CM

## 2018-08-10 DIAGNOSIS — I11.9 HYPERTENSIVE LEFT VENTRICULAR HYPERTROPHY, WITHOUT HEART FAILURE: ICD-10-CM

## 2018-08-10 DIAGNOSIS — I35.2 NONRHEUMATIC AORTIC INSUFFICIENCY WITH AORTIC STENOSIS: ICD-10-CM

## 2018-08-10 DIAGNOSIS — N18.5 CHRONIC KIDNEY DISEASE, STAGE 5: ICD-10-CM

## 2018-08-10 DIAGNOSIS — R26.89 BALANCE DISORDER: ICD-10-CM

## 2018-08-10 PROBLEM — I35.0 NONRHEUMATIC AORTIC VALVE STENOSIS: Status: ACTIVE | Noted: 2018-08-10

## 2018-08-10 PROCEDURE — G8978 MOBILITY CURRENT STATUS: HCPCS | Mod: CM,PN

## 2018-08-10 PROCEDURE — 99999 PR PBB SHADOW E&M-EST. PATIENT-LVL III: CPT | Mod: PBBFAC,,, | Performed by: INTERNAL MEDICINE

## 2018-08-10 PROCEDURE — 99214 OFFICE O/P EST MOD 30 MIN: CPT | Mod: S$PBB,,, | Performed by: INTERNAL MEDICINE

## 2018-08-10 PROCEDURE — G8979 MOBILITY GOAL STATUS: HCPCS | Mod: CJ,PN

## 2018-08-10 PROCEDURE — 97162 PT EVAL MOD COMPLEX 30 MIN: CPT | Mod: PN

## 2018-08-10 PROCEDURE — 99213 OFFICE O/P EST LOW 20 MIN: CPT | Mod: PBBFAC,PO | Performed by: INTERNAL MEDICINE

## 2018-08-10 RX ORDER — TRAMADOL HYDROCHLORIDE 50 MG/1
TABLET ORAL
COMMUNITY
Start: 2018-07-19 | End: 2018-09-19 | Stop reason: SDUPTHER

## 2018-08-10 RX ORDER — AMLODIPINE BESYLATE 5 MG/1
5 TABLET ORAL NIGHTLY
Qty: 30 TABLET | Refills: 11 | Status: SHIPPED | OUTPATIENT
Start: 2018-08-10 | End: 2018-08-17

## 2018-08-10 NOTE — PLAN OF CARE
TIME RECORD    Date: 08/10/2018    Start Time:  1034  Stop Time:  1110    PROCEDURES:    TIMED  Procedure Time Min.    Start:  Stop:     Start:  Stop:     Start:  Stop:     Start:  Stop:          UNTIMED  Procedure Time Min.     Initial evaluation Start:  1034  Stop:  1110  36    Start:  Stop:      Total Timed Minutes:  0  Total Timed Units:  0  Total Untimed Units:  1  Charges Billed/# of units:  1    OUTPATIENT PHYSICAL THERAPY   PATIENT EVALUATION    Onset Date: Chronic of a couple years duration  Primary Diagnosis:   1. Dizziness       Treatment Diagnosis: Dizziness, Balance deficit  Past Medical History:   Diagnosis Date    Allergy     Arthritis     Gout    BPH (benign prostatic hyperplasia)     CAD (coronary artery disease) 2006    Chronic kidney disease     due to ibuprofen    Colon polyp     Diverticulosis     Gastritis     GERD (gastroesophageal reflux disease)     HTN (hypertension) 3/27/2012    Hyperlipidemia     LVH (left ventricular hypertrophy)     Mesenteric ischemia     Murmur, cardiac 3/27/2012    GRICELDA (obstructive sleep apnea)     DOES NOT USE A MACHINE    Sinus problem     Syncope and collapse      Precautions: Fall  Prior Therapy: Yes (3 TKAs, also rotator cuff surgeries)  Medications: Micheal Hunt has a current medication list which includes the following prescription(s): albuterol, albuterol-ipratropium, allopurinol, aspirin, atorvastatin, carvedilol, colcrys, cyanocobalamin (vitamin b-12), finasteride, fluticasone, gabapentin, amlodipine, isosorbide mononitrate, nitrostat, sodium chloride, tamsulosin, tramadol, valsartan, and zolpidem.  Nutrition:  Overweight  History of Present Illness: Pt presents to PT with history of dizziness.  Initial incident of dizziness occurred following a fall from a roof 25 years ago.  He reports that he had dizziness for about 6 months but it eventually went away on it's own.  He reports that he did use an eye patch and ear plugs to help  "manage symptoms.  After 6 months it resolved and he was doing well until about 2 years ago when he developed insidious onset dizziness.  He has had intermittent problems since that time.  He has seen ENT and was told he has a problem with his Left ear.  He does have history of cardiac issues.  He is referred to PT at this time.   Prior Level of Function: Independent  Social History: Lives alone but daughter lives nearby.  He is retired but does take care of 4 horses.    Place of Residence (Steps/Adaptations): 1 story home with 3 steps (also has a ramp)    Functional Deficits Leading to Referral/Nature of Injury: Intermittent dizziness.  Until 2 weeks ago he was not having it every day but has been daily over the past 2 weeks.  When dizzy he has limitations in performing functional activities including walking, taking care of horses.    Patient Therapy Goals: To not be so dizzy.  To be able to do what he needs to do without fear of falling.      Subjective     Micheal Hunt states "Any quick movement of my head brings it on." (doesn't matter which direction)    Pain:  No pain reported; however, he does report dizziness  Character: feels like he is spinning  Worse with sudden head turns, Looking up when outside (not when inside), sitting up from laying position.    Better with being still, laying down, covering 1 eye (not specific to eye), plugging L ear.    No reports of paresthesias around his mouth or tongue.  No falls.    Patient is hard of hearing with L ear being worse than R.      Objective     Posture: Sitting:  Flattened lumbar lordosis, minimal increased thoracic kyphosis, minimal to moderate rounded shoulder and forward head posture.    Palpation: N/A  Sensation: No deficits reported.    DTRs:  Not applicable  Range of Motion/Strength: Occular movement WNL without nystagmus.  Cervical ROM: flexion WFL, ext 50%, rotation 70% B but with discomfort with R rotation.    Strength: LE 4/5  Flexibility: " Gastrocs moderate tightness, hamstrings minimal to moderate tightness.    Gait: Without AD  Analysis: Decreased cristian, limited head turns, avoids quick movements.  Minimally decreased foot clearance B.    Bed Mobility:Independent  Transfers: Independent with increased UE support.    Special Tests: Head shake (-) for nystagmus, head thrust (-) for nystagmus; Bhartah Hallpike R (-) for nystagmus but had increased dizziness when returning to sit; L (-) for nystagmus but minimal dizziness when returning to sit.  This increased when sitting on edge of mat.    Other: Dizziness handicap index 84 (severe impairment)  G-code current mobility CM, goal mobility CJ  Dynamic gait index 17/24 (predictive of falls)  Treatment: Educated pt in role of PT and proposed POC.  Verbalized understanding and agreement.      Assessment       Initial Assessment (Pertinent finding, problem list and factors affecting outcome): Pt presents to PT with history of chronic dizziness that has been worsening over the past 2 weeks, decreased cervical ROM, decreased LE flexibility, and minimally decreased LE strength. This interferes with his ability to participate in his usual activities of walking, taking care of his home, taking care of horses, managing yardwork, driving.        History  Co-morbidities and personal factors that may impact the plan of care Examination  Body Structures and Functions, activity limitations and participation restrictions that may impact the plan of care    Clinical Presentation   Co-morbidities:   advanced age, CAD, difficulty sleeping and HTN        Personal Factors:   age  no deficits Body Regions:   neck  lower extremities  vestibular system    Body Systems:    ROM  strength  balance  gait            Participation Restrictions:   N/A     Activity limitations:   Learning and applying knowledge  no deficits    General Tasks and Commands  no deficits    Communication  no deficits    Mobility  lifting and carrying  objects  walking  driving (bike, car, motorcycle)    Self care  washing oneself (bathing, drying, washing hands)  toileting  dressing    Domestic Life  shopping  cooking  doing house work (cleaning house, washing dishes, laundry)    Interactions/Relationships  no deficits    Life Areas  no deficits    Community and Social Life  community life  recreation and leisure         evolving clinical presentation with changing clinical characteristics                      moderate   high  high Decision Making/ Complexity Score:  moderate         Rehab Potiential: fair    Short Term Goals (3 Weeks): 1) Facilitate decreased frequency of dizziness episodes, 2) Facilitate improved cervical ROM, 3) Facilitate improved LE flexibility.    Long Term Goals (6 Weeks): 1) Pt will consistently ambulate over level surfaces at normal cristian with head turns with rare occurrence of dizziness, 2) Pt will consistently perform lower body bathing/dressing activities with minimal increase in dizziness, 3) Pt will improve DGI to > 20/24, 4) Pt will be independent in HEP.      Plan     Certification Period: 08/10/2018 to 09/21/2018  Recommended Treatment Plan: 2 times per week for 6 weeks: Gait Training, Patient Education, Therapeutic Activites, Therapeutic Exercise and vestibular dysfunction treatment  Other Recommendations: N/A      Therapist: Neelam Saldaña, PT    I CERTIFY THE NEED FOR THESE SERVICES FURNISHED UNDER THIS PLAN OF TREATMENT AND WHILE UNDER MY CARE    Physician's comments: ________________________________________________________________________________________________________________________________________________      Physician's Name: ___________________________________

## 2018-08-10 NOTE — PROGRESS NOTES
Subjective:    Patient ID:  Micheal Hunt is a 77 y.o. male who presents for  of Follow-up (3mos)  For post CABG, 3 months review, post biopsies of kidney and stomach, elevated HTN, progressive CKD now stage 4  PCP: Dr. Lakhani  Renal: Dr. Rojo, last seen 1/10/2018  ENT: Dr. Nails, now Dr. Chahal in [place]  GI: Dr. Harris, Dr. Saleem  Physical medicine: Dr. Whitehead  Urology: Dr. Herring  Orthopedic: Dr. Fox in [place], [place], [phone], Dr. Álvarez  Lives alone, daughter, Tonya, on the same ranch, 20 acre, 4 horses, 20 chicken, no  workers, prior commercial contractor, grandson, Uziel  Daughter, Crow, nutritionist in [facility] supportive of Mediterranean diet, now on plant-based diet, and a Yoga instruction.      Japanese  (Delisa) male, retired cottrell at [employer], here for pre-op evaluation. Significant cardiac history with previous CABG in [place]. Stress test earlier this year was abnormal: Lexiscan -  Nuclear Quantitative Functional Analysis:                                    LVEF: 47 % (normal is 47 - 59)                                               LVED Volume: 195 ml (normal is 91 - 155)                                     LVES Volume: 104 ml (normal is 40 - 78)                                                                                                                   Impression: ABNORMAL MYOCARDIAL PERFUSION                                    1. There is evidence for mild to moderate myocardial ischemia in the         septal wall of the left ventricle, and moderate myocardial ischemia in       the anteroapical wall of the left ventricle with associated stress           induced LV cavity dilatation.                                                2. There is mild intensity fixed defect in the lateral wall of the left      ventricle, consistent with myocardial injury.                                3. There is abnormal wall motion at rest showing severe hypokinesis of       the  septal wall of the left ventricle.                                       4. Resting LV function is normal.  (normal is 47 - 59)                       5. The left ventricular volume is moderately increased at rest.              6. The extracardiac distribution of radioactivity is normal.    Subsequent angiogram, 5/2012:  ANGIOGRAPHIC RESULTS:                                                                                                                                  DIAGNOSTIC:                                                                  Patient has a right dominant coronary artery.                                                                                                             - Left Main Coronary Artery:                                                 The LM is occluded. There is JOURDAN 0 flow.                                                                                                                 - Left Anterior Descending Artery:                                           The LAD has luminal irregularities. There is JOURDAN 3 flow. Fed                from the LIMA graft                                                                                                                                       - Left Circumflex Artery:                                                    The LCX was not found.                                                                                                                                    - Right Coronary Artery:                                                     The RCA has luminal irregularities. There is JOURDAN 3 flow.                    appear to be fed from SVG, could not cannulate, poorly visualized.                                                                                        - Aortic Root:                                                               The Aortic Root is normal. There is JOURDAN 3 flow.                             Lesion  Details: Moderate proximal tortuosity, mild to                        moderate calcification.                                                                                                                                   - HERRON To LAD:                                                               The LIMA to LAD is normal. There is JOURDAN 3 flow.                                                                                                                                                                                       D. SUMMARY:                                                                                                                                               1. Three vessel coronary artery disease.                                     2. Normal LVEF.                                                              3. Mild aortic insufficiency.                                                4. Very difficult study                                                      5. Multiple attempts to cannulate SVG which appears to go to the RCA.        Native RCA appears to have an ostial occlusion.    Was continued on optimal medical management. Could not tolerate atenolol due to severe weakness. Recently without any cardiac complaints. Limited by left knee with soreness and pains. Went to [facility] in [place], [place] and told of the prior implant was too small and not stable. Anticipating re-op by Dr. Fox, a [employer] graduate, in [place]. [place].    Since visit of 10/2/2012, underwent left TKR with good results, had 16 weeks of rehab without much problem. Here today due to hypertension. Home record showed -185/83-99. Noted more dizziness when the pressures are high. Problem is helped by Meclizine. Have decreased exercise following the operation. Diet said to be no salt. No problem with medications,  need 90 days supply.    Since visit of 4/4, concern about his kidney, see telephone enc on 4/22 with BUN of  28, Scr 1.4, K+ 4.1, and eGFR 50. Old record reviewed, no significant julian in function since 6/2011. Agree now to try HCTZ 12.5 mg every other day along with increase in lisinopril to 80 mg daily. Home BP reviewed 159-189/85-99. No other new problem. Wants comprehensive blood work the next visit.    Since visit of 12/13, left sided CP is gone, appears to be due to straining while carrying a 34 lbs grandson. Discussed cath report and send to [place]. Home /80. Daughter feels he is stressed, personally do not feel so. Would like to be back in [place] but not family are here. Ill younger sister in [place]. Biking 3 times a week for 15 to 30 minutes twice a day. Do little weights every day.  Lexiscan, 12/19/2013  Nuclear Quantitative Functional Analysis:   LVEF: 42 % (normal is 47 - 59)  LVED Volume: 193 ml (normal is 91 - 155)  LVES Volume: 112 ml (normal is 40 - 78)    Impression: ABNORMAL MYOCARDIAL PERFUSION  1. There is evidence for mild myocardial ischemia in the anterior and lateral apical walls of the left ventricle with associated stress induced LV cavity dilatation.   2. The perfusion scan is free of evidence for myocardial injury.   3. There is abnormal wall motion at rest showing moderate global hypokinesis of the left ventricle.   4. There is resting LV dysfunction with a reduced ejection fraction of 42 %.  (normal is 47 - 59)  5. The left ventricular volume is moderately increased at rest.   6. The extracardiac distribution of radioactivity is normal.   7. When compared to the previous study from 04/12/2012, the LVEF have decreased with global hypokinesia.  8. [facility] SSS =2 =SDS, suggest that 2.5% of myocardium may be ischemic.    ECHO, 12/2013, CONCLUSIONS     1 - Biatrial enlargement.     2 - Enlarged left ventricular enlargement.     3 - Eccentric hypertrophy.     4 - Low normal to mildly depressed left ventricular systolic function (EF 50-55%).     5 - Left ventricular diastolic  dysfunction.     6 - Mild mitral regurgitation.     7 - Normal right ventricular systolic function .     8 - Mild to moderate aortic regurgitation.     9 - Some improvement in LV function with reduction in AR from echo on 9/29/2012.  Since visit of 5/2, had to go to [place] for sister, 60 year old with a CVA. Stopped HCTZ for 5 weeks due to traveling. No CP but some return of indigestion, previously helped by Prilosec. Used for about 4 weeks with benefit. Discuss Rx 4-8 weeks treatments are reasonable. Blood work on 6/3 LDL is 113, , with HDL of 33. Cr. Remains stable at 1.4 with K+ 4.1. PSA is elevated to > 4 on finasteride 5 mg for past 10 years, prescribed by Urologist in [place].  CBC is normal.    Since visit of 6/11, did not get the referral to Urology, also had recent fever and chills with lower abdominal pain while in [place] last Thursday to Friday, noted some weakened stream. Also at night can hear strong heart beat and take BP showing , would then take an additional 40 mg of lisinopril on top of 80 mg each AM. Blood work showed first time elevation of Scr to 1.6 without change in BUN and normal K+.     Since visit of 7/11, had problem with spironolactone, took only 2 doses and had severe dizziness for 2 days, also had to adjust timing of medications to avoid dizziness after medications. 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 Sleeping well. Other medications are fine, compliant. Blood work showed slight rise in Scr to 1.5-1.6 from 1.4 after spironolactone. Saw Dr. Herring and given antibiotic. BP at home 140-160/80.    Since visit of 8/15, feeling fine, no problem with medications, home BP similar with systolic of 140-150. 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 No problem note, no limits. Using Tylenol 500 mg BID for OA, helpful. Sleeping well, tried Melatonin and now just using warm milk at HS. Blood  "work reviewed, normal testosterone, uric acid 6.5, BMP with stable Cr of 1.5 with FBS of 103, Lipid panel shows LDL of 78.8 on 80 mg of atorvastatin.     Since visit of 9/17, saw Dr. Calderon for pre-op evaluation and suggested sooner follow up for abnormal stress test. Denies any CP but with occasional chest "congestion" with pleuritic CP with deep breathing, Occurs now and then, sometime related to heavy exertion. Helped with breathing Rx in hospital and albuterol inhaler at home. Last spell about a month ago, lasted for 30 minutes. Had OP left operation on 11/25, no problem. Since home no problem. CXR on 11/6 was normal. Recent labs - LDL 86, HDL 36, normal CMP and CBC.  Serum Cr 1.4, eGFR 49.1, stable. Request nebulizer for home use.    Since visit of 12/10, next morning woke up with high BP, 184/102, felt dizzy, no fall, then a constant heart "hurting", grade 5/10, seems to increase after meal, tried Rolaids and Tums without much help. Pain constant til now. Call answering service at 6 AM and advised to come to office for EKG and evaluation. EKG NSR with frequent APC, rate of 68, LVH with STT abnormalities, no acute change. Discussed atypical presentation of heart pains and also possible GI source of problem. Have not seen any GI specialist for his GERD. Also discussed use of NTG for chest pains. BP at home this AM, 174/100, obviously distressed.    Since visit of 1/9/2014, continue with some back pain over past 6 months, concern possibly due to high dose atorvastatin. Also noted some fatigue with palpitation in the middle of the night, have to get up 3 times for nocturia. No CP but BP elevated in the middle of night to 140/92. Last Holter in 4/2012: PREDOMINANT RHYTHM  1. Sinus rhythm with heart rates varying between 40 and 195 bpm with an average of 77 bpm.     VENTRICULAR ARRHYTHMIAS  1. There were occasional PVCs totalling 290 and averaging 12 per hour.  There were 2 couplets.    2. There were no episodes of " ventricular tachycardia.    SUPRA VENTRICULAR ARRHYTHMIAS  1. There were very frequent PACs totalling 5254 and averaging 228 per hour.  There were 143 couplets.    2. There were no episodes of sustained supraventricular tachycardia.    SINUS NODE FUNCTION  1. There was no evidence of high grade SA nicolás block.     AV CONDUCTION  1. There was no evidence of high grade AV block.     DIARY  1. The diary was not returned    MISCELLANEOUS  1. This was a tape of adequate length (23 hrs).    Also worries about friend in [place] dying without a definite cause. Some worries of kidney and liver due to medications. Last labs in 11/2013 reviewed.     Since visit of 3/17, no new problem, no further CP,   Holter done without symptoms - PREDOMINANT RHYTHM  1. Sinus arrhythmia with heart rates varying between 41 and 164 bpm with an average of 79 bpm.     VENTRICULAR ARRHYTHMIAS  1. There were occasional mostly monomorphic PVCs totalling 272 and averaging 11 per hour.  There was 1 couplet.    2. There were no episodes of ventricular tachycardia.    SUPRA VENTRICULAR ARRHYTHMIAS  1. There were frequent PACs totalling 1748 and averaging 72 per hour.  There were 9 bigeminal cycles.  There were 47 couplets. There were 5 triplets.     2. 1.5% of complexes.    3. There were no episodes of sustained supraventricular tachycardia.    SINUS NODE FUNCTION  1. There was no evidence of high grade SA nicolás block.     AV CONDUCTION  1. There was no evidence of high grade AV block.     2. The longest RR interval was 2110 msec.     DIARY  1. The diary was returned, but not completed    MISCELLANEOUS  1. This was a tape of adequate length (24 hrs).    Labs showed new glucose intolerance, , admit to using lot of sugar recently. CK is elevated with back pain. Last Lipid in 11/2013 LDL-C was 86.4. Sleep evaluation next month.     Since visit of 3/28/2014, stressed due to close sister illnesses with Alzheimer in [place], stayed there for 8 weeks and recent  return with weight gain. No definite muscular pains still with mild chronic low back pain. CP is better, occasional burning.  Home BP reviewed, mostly > 150/80, have occasional HA, every other week. Using HCTZ 25 mg , half tab every other day. Not yet to sleep evaluation. Discussed potential cause of resistant HTN due to untreated GRICELDA.  Labs reviewed CPK remains elevated 362 to 421, FBS improved from 127 to 110. Renal stable with Cr 1.6 to 1.5, K+ 4.1. Lipid LDL-C 69.8. Discussed optional of trying 10 mg of atorvastatin or continuing on 40 mg and be aware of muscle pains / weakness and to monitor CPK.    Since visit of 6/26, no new problem, labs showed slow progressive CKD eGFR now 40, Cr 1.7, BUN 25, . Want to see nephrologist.  Had Sleep study on 6/30 - CONCLUSION:  Nocturnal polysomnography demonstrates:  1.  Obstructive sleep apnea to be present with 44.2 events an hour with    desaturation to 81%.  2.  Sinus rhythm with occasional PVC.     PLAN:  He will return for nasal CPAP titration at a later date.    Since visit of 7/25, OK til 3 weeks ago, started to experience ARMAS, any rushing, similar to prior to CABG. Some CP, more like burning, grade 1/10, last until relax. ECG today SA, rate 56. 1st degree AVB, LVH with ST abnormalities. Called for earlier appointment. No problem with the medications. Just started CPAP this past Sunday, 3 days ago. Last lab again reviewed - K+ 4.1. Had prior problem with spironolactone. Home /95.     Since visit of 9/10, the CP and ARMAS have improved. Could not tolerate eplerenone 25 mg due to marked bradycardia, rate to 40 along with tiredness and weakness, not the expected side effects. Home /79, feeling better except for dry cough, nasal congestion, and bad HA, recurrent problem over the years. Best Rx with short course of Augmentin. Given Doxycycline without benefit. Lab today , BMP with Cr stable at 1.6, K+ 4.0. Also wants review with GI on GERD, and not  able to lose weight.    Since visit of 9/24/2014, no new problem, no problem with medications. GRICELDA reviewed, troubled by being awaken hourly during testing. Denies any fatigue, lots of energy. Labs - stable CKD eGFR 42, , K+ 4.1. Last urine 6/2013. Home BP good at 140/80-85. 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     Since visit of 12/5/2014, had problem with diverticulitis last month now corrected diet, no nuts, more fiber. Denies any CP nor SOB. No sleepiness. Weight up and down. Recent labs A1C 6.2%, LDL-C 59, Hgb 13.3, CMP showed eGFR 39 with Cr 1.7, K+ 3.8.    Since visit of 3/19/2015, no new problem. Had review with Dr. Álvarez, X-ray negative and completed 6 weeks of PT without much benefit. Being closely followed by Dr. Hodgson, recent labs shows eGFR 39, Cr 1.7, have proteinuria, , mild anemia, Hgb 13.4, iron profile is sufficient. Lipid excellent, LDL 58, non-HDL 89.    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 Today discomfort started after 3 zandra, had to stop for 20 minutes, did not use NTG. Similar problem over the last 8 months. Compliant with medications but home BP are high, 130-188/, HR 65-85. ECG shows NSR PAC, LVH, pulmonary pattern. Last lipid in 8/2015 LDL 58.2, non-HDL 90. Active biking twice a week for 30 minutes then farm walk daily.    Since visit of 2/18, continue to have occasional chest burning when rushing fast across pasture or lifting heavy bags. Has about 5 minutes of discomfort, grade 1/10. Also noted some nighttime dry cough, don't believe due to Lisinopril, like nasal congestion with PND. Daughter have Zyrtec, will try. Discussed options for Rx of Abnormal stress test, had difficult LHC in 2012 along with stage 3 CKD. Will proceed with optimize medical Rx first.   ECHO, 3/2016 CONCLUSIONS     1 - Biatrial enlargement.     2 - Enlarged left ventricular enlargement.     3 -  Eccentric hypertrophy.     4 - Low normal to mildly depressed left ventricular systolic function (EF 50-55%).     5 - Mild to moderate aortic regurgitation.     6 - Mild mitral regurgitation.     7 - Left ventricular diastolic dysfunction. E/e'(lat) is 10.  This along with the following abnormalities (ATUL = 49.13 and LVMI = 181.70) suggests significant diastolic dysfunction.     8 - Right ventricular enlargement with mildly depressed systolic function.     9 - The estimated PA systolic pressure is 28 mmHg.     10 - No significant change from Echo on 12/19/2013.     Lexiscan - Nuclear Quantitative Functional Analysis:   LVEF: 34 % (normal is 47 - 59)  LVED Volume: 192 ml (normal is 91 - 155)  LVES Volume: 126 ml (normal is 40 - 78)    Impression: ABNORMAL MYOCARDIAL PERFUSION  1. There is evidence for moderate myocardial ischemia in the inferolateral wall of the left ventricle with associated stress induced LV cavity dilatation with underlying injury present.   2. There is abnormal wall motion at rest showing moderate global hypokinesis of the left ventricle. severe hypokinesis of the inferolateral wall of the left ventricle.   3. There is resting LV dysfunction with a reduced ejection fraction of 34 %.  (normal is 47 - 59)  4. The left ventricular volume is mildly increased at rest.   5. The extracardiac distribution of radioactivity is normal.   6. When compared to the previous study from 12/19/2013, current study indicate inferolateral defects.  7. [employer] SSS=10, SRS=5, and SDS=5, suggest that 7% of myocardium may be ischemic.    Since visit of 3/14, feeling better, wanted no change with medications, long discussion on use of Coreg and need for titration. Also labs reviewed, mild anemia due to CKD, stage 3, eGFR 33, decreased from 40, Dr. Hodgson recommend better cholesterol control. , and non- on 80 mg of Atorvastatin. Want better diet.    Since visit of 4/25 had problem on 5/2/2016 with  "sudden onset of vertigo and right ear pain. Had review with Dr. Vasquez and medications changes recommended see telephone encounter note. Now review medications again and vertigo improved after taking Meclizine 4-25 mg tabs daily. Home BP log 127-170/, HR 63-82. Currently back on Coreg 3.125 mg bid. Discuss hope to proceed with Coreg titration with the goal of 25 mg bid.    In 6/2016, had tangled with the trees with review by Dr. Vasquez, right ribs bruised, no fracture, also vertigo with quick turn, fell. Still with some exertional chest burning, average twice a week, but very brief, just seconds. No problem with medications. Had review with Dr. Nails.    In 7/2016, multiple complains, generalized itching started about a week ago, stopped Coreg but itching still there. Also problem with recurrent vertigo exacerbated by head turning or bending, no fall but bothersome, able to do all farm chores. In addition noted to be more tired with the higher dose of Coreg. No SOB nor CP. Recent labs show NICK with Cr 2.2 to 2.5, eGFR down to 24. Will be seeing nephrologist 8/2/2016.    In 12/2016, problem with acute head congestion, frontal, seasonal, usually helped with Augmentin for 10 days. Requesting Rx, option discussed. Feeling "good" in general, able to do work without problem. Ran out of Zetia over a month ago. Lipid test LDL 84.4 on 40 mg of atorvastatin. Home /75-80. Dr. Vasquez had changed ACE-I to Valsartan due to cough.    In 3/2017, find a little tiredness and daytime sleepiness over the last 2 weeks, change to daylight saving made a little worst. 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 Sleep well, 6-8 hours, feel good on awakening. Home /80. Compliant with medications. Some concern of more frequent BM, 4-5 times daily. Eating more fruit. Lipid LDL 62.    In 6/2017, note lot of urine after kidney biopsy about 4 weeks ago. Biopsy reported as OK. Had stomach biopsy about a week ago, " "since then been feeling "yukki", was informed to expect it. Home BP is similar to office reading, Remain active on farm, lot of manual work no problem. No more CP nor ARMAS. ECG today suggest WAP, 64, left axis, LVH, PRWP and high lateral T-wave inversion no change from ECG in 2015. No problem with medications. First cousin age 44 yo,  of massive MI, no symptoms.    In 2017, noted bad itching in both legs for about 2 months, not controlled with topical Rx including steroid. Off Valsartan for 3 days without any changed but BP elevated to 150/90, restarted 4 days ago. Half life of 6 hours: therefore adequate off trial. Also have problem with insomnia, Ambien was helpful before. Home BP this am 140/73. No other problem. Will restart Valsartan first, wants to use up Lisinopril supply, advised to first use up current Valsartan then can retry the Lisinopril, understand side effect of dry cough. Vertigo improved post sinus balloon procedure.  Echo 2017  CONCLUSIONS     1 - Mildly enlarged aortic root.     2 - Biatrial enlargement.     3 - Concentric hypertrophy.     4 - No wall motion abnormalities.     5 - Normal left ventricular systolic function (EF 55-60%).     6 - Mild aortic regurgitation.     7 - Mild mitral regurgitation.     8 - Indeterminate LV diastolic function.     9 - Normal right ventricular systolic function .     10 - The estimated PA systolic pressure is greater than 25 mmHg.     11 - Suggest some improvement in LVEF from Echo in 3/2016.     In 2018, report congestion, sinusitis, bronchitis since , used all kind of medications. Renal review on 1/10 showed significant progression of disease, Cr up to 4.3 from 3.3 just 3 months ago. eGFR down to 12. Home BP log reviewed 137 to 176 / 77 to 90, HR 65 to 79. Been taking an addition half of Valsaran 320 mg when SBP > 175 or BBP > 85, recall doing it for 3 times. No cardiac complaints, no CP nor SOB, been able to do all the farm work " without problem. ECG in 12/2017 - sinus bradycardia, rate 536 left axis, LVH with STT abnormalties.    in 5/2018, here for review, complaints of chronic fatigue but still able to take care of the farm without much problem. Lost 28 lbs with plant-based diet. Compliant with medications. Home /80. LDL in 4/2018 47.6. Dizziness improved over the past 2 days with time adjustment on taking the medications.    HPI Comments: in 8/2018, no new problem, noted BP higher at home now >130 to 140 /70. Had brief hospitalization at [facility] in 6/2018 for PNA. On more of a plant diet due to diverticulosis. 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 Labs reviewed LDL 46.4, eGFR 7.1.  Cardiac work up 6/2018  Echo - Conclusion   · Mild-to-moderate regurgitation is present in the aortic valve..  · The left ventricle cavity is mildly dilated.  · Left atrium is moderately dilated.  · Tricuspid valve shows mild regurgitation.  · Left ventricle ejection fraction is mildly decreased at 46%  · Grade I (mild) left ventricular diastolic dysfunction consistent with impaired relaxation.  · LA pressure is normal.  · There is moderate aortic valve stenosis.  · RA cavity is moderately dilated.  · RV systolic function is mildly reduced.  · Normal central venous pressure (3 mm Hg).     Lexiscan Conclusion   · Arrhythmias during stress: rare PACs, rare PVCs.  · There is a left ventricular function defect present in the inferoseptal location(s).  · There is a left ventricular perfusion defect that is small to moderate in size with moderate reduction in uptake present in the apical to basal anterior location(s) that is predominantly fixed.  · There is a left ventricular perfusion defect that is small in size with moderate reduction in uptake present in the inferior location(s) that is predominantly fixed.  · There is a left ventricular function defect present in the inferoseptal location(s).  · There is evidence of transient ischemic  "dilation (TID). TID was appreciated visually and quantitatively.  · No symptoms reported           Review of Systems      Constitutional: no further chills, fevers, and sweats and no real fatigue, less head congestion uses mask outdoor, weight loss 7 lbs since 5/2018  Eyes: negative for icterus, redness and visual disturbance  Respiratory: negative for asthma, minimal dry cough with seasonal sinusitis, no dyspnea on exertion, no hemoptysis, pleurisy/chest pain and wheezing, [place] score 0 now 7  Cardiovascular: no further chest pain, chest pressure/discomfort, dyspnea, near-syncope, no further nightly palpitations with less occasional /90, no paroxysmal nocturnal dyspnea and syncope  Gastrointestinal: negative for abdominal pain, nausea and vomiting, no further heart burn on exertion, BM more frequent.  Musculoskeletal:  no have muscle cramp in the left leg post op receiving injection from Dr. Whitehead  Neurological: negative for coordination problems, some dizziness after medications, takes in interval, no gait problems, less headaches, likely sinus, no paresthesia, tremors and vertigo, sleeping 8 hours nightly.  Psych: no depression nor anxious, close sister (71 year old) passed in [place] 1/2016.    Objective:    Physical Exam     Constitutional: He appears healthy. No distress.   HENT:   Mouth/Throat: Dentition is normal.   Eyes: Conjunctivae are normal.   Neck: Thyroid normal. Neck supple. Circumference 15.5"  Cardiovascular: Normal rate, regular rhythm and normal pulses. PMI is not displaced. Exam reveals no gallop.   Medium-pitched machinery crescendo-decrescendo early systolic murmur is present with a grade of 3/6 at the upper right sternal border, upper left sternal border and apex   Pulses:   Carotid pulses are 2+ on the right side, and 2+ on the left side.   Dorsalis pedis pulses are 2+ on the right side, and 2+ on the left side.   Pulmonary/Chest: Breath sounds normal. He exhibits no " "tenderness.   Abdominal: Soft, very active bowel sounds. Waist 46" down to 42.5"  Extremities: no pitting edema around the sock line.   Neurological: He is alert and oriented to person, place, and time. Gait normal.   Skin: Skin is warm and dry. No cyanosis. No pallor. Nails show no clubbing.     Assessment:       1. Coronary artery disease involving native coronary artery of native heart without angina pectoris    2. Pure hypercholesterolemia    3. Hypertensive left ventricular hypertrophy, without heart failure    4. Sigmoid diverticulitis, 3 episodes, 2005, 2015, 2018    5. Chronic kidney disease, stage 5    6. Essential hypertension    7. Nonrheumatic aortic insufficiency with aortic stenosis    8. LV dysfunction, EF 50%, reduced to 34%, now 46%         Plan:   Micheal was seen today for follow-up.    Diagnoses and associated orders for this visit:    Coronary artery disease involving native coronary artery of native heart without angina pectoris    Pure hypercholesterolemia    Hypertensive left ventricular hypertrophy, without heart failure  -     INV amlodipine (NORVASC) 5 MG tablet; Take 1 tablet (5 mg total) by mouth every evening.  Dispense: 30 tablet; Refill: 11    Sigmoid diverticulitis, 3 episodes, 2005, 2015, 2018    Chronic kidney disease, stage 5  -     INV amlodipine (NORVASC) 5 MG tablet; Take 1 tablet (5 mg total) by mouth every evening.  Dispense: 30 tablet; Refill: 11    Essential hypertension    Nonrheumatic aortic insufficiency with aortic stenosis    LV dysfunction, EF 50%, reduced to 34%, now 46%    - All medical issue review, continue current Rx.  - CV status stable, continue current Rx, all medications reviewed, patient acknowledge good understanding.  - Need close follow up with Dr. Rojo in regard to BP Rx   Instruction for Mediterranean diet and heart healthy exercise given.  - Weigh twice weekly, try to lose 1-2 lbs per week  - Belly exercise daily  - Follow up in 3 months per patient's " preference    Chronic fatigue - improved    Benign non-nodular prostatic hyperplasia without lower urinary tract symptoms  -     Change terazosin (HYTRIN) 10 MG capsule; Take 2 capsules (20 mg total) by mouth every evening.  Dispense: 180 capsule; Refill: 3    - Check home blood pressure, 2 days weekly, do 2 readings within 5 minutes in AM and PM, keep log for review.    Proteinuria    Diastolic dysfunction    ARMAS (dyspnea on exertion) - improved    Abdominal obesity    OA, post left TKR      Patient Active Problem List   Diagnosis    Osteoarthritis of right knee, L-TKR 10/2012    Nocturia    Hematuria    BPH (benign prostatic hyperplasia)    Carotid stenosis    Benign essential HTN    Hyperlipidemia, baseline     CAD (coronary artery disease), 2V CABG 2007    Gout, arthritis    Vertigo    LVH (left ventricular hypertrophy) due to hypertensive disease    Abnormal cardiovascular stress test    GERD (gastroesophageal reflux disease)    Elevated PSA    Acquired hammer toe    Diastolic dysfunction    Abdominal obesity    Back pain, chronic    Glucose intolerance (impaired glucose tolerance)    Anemia, mild    Gastric nodule    Diverticulitis, 2005, 2015    Personal history of colonic polyps    PSA elevation    DDD (degenerative disc disease), lumbar    LV dysfunction, EF 50%, reduced to 34%, now 46%    Other spondylosis, lumbar region    Knee pain    NICK (acute kidney injury)    BPH with obstruction/lower urinary tract symptoms    Itching, both legs, onset 7/2017    Chronic sinusitis    Mild persistent asthma without complication    Sigmoid diverticulitis, 3 episodes, 2005, 2015, 2018    Anemia due to stage 4 chronic kidney disease    Thrombocytopenia    Renal cyst    Unilateral inguinal hernia    History of colon polyps    BMI 29.0-29.9,adult    LLL pneumonia    Chronic kidney disease, stage 5    Generalized abdominal pain    Chronic right-sided heart failure     Nonrheumatic aortic insufficiency with aortic stenosis, moderate     Total face-to-face time with the patient was 30 minutes and greater than 50% was spent in counseling and coordination of care. The above assessment and plan have been discussed at length. Labs and procedure reviewed. Problem List updated. Asked to bring in all active medications / pills bottles with next visit.

## 2018-08-13 ENCOUNTER — TELEPHONE (OUTPATIENT)
Dept: FAMILY MEDICINE | Facility: CLINIC | Age: 78
End: 2018-08-13

## 2018-08-13 RX ORDER — LOSARTAN POTASSIUM AND HYDROCHLOROTHIAZIDE 12.5; 1 MG/1; MG/1
1 TABLET ORAL DAILY
Qty: 90 TABLET | Refills: 3 | Status: SHIPPED | OUTPATIENT
Start: 2018-08-13 | End: 2018-08-17 | Stop reason: ALTCHOICE

## 2018-08-13 NOTE — TELEPHONE ENCOUNTER
Received fax from [facility] stating Valsartan has been recalled. Requesting alternative therapy as a result. Please advise.

## 2018-08-14 ENCOUNTER — LAB VISIT (OUTPATIENT)
Dept: LAB | Facility: HOSPITAL | Age: 78
End: 2018-08-14
Attending: PHYSICIAN ASSISTANT
Payer: MEDICARE

## 2018-08-14 ENCOUNTER — TELEPHONE (OUTPATIENT)
Dept: FAMILY MEDICINE | Facility: CLINIC | Age: 78
End: 2018-08-14

## 2018-08-14 ENCOUNTER — CLINICAL SUPPORT (OUTPATIENT)
Dept: REHABILITATION | Facility: HOSPITAL | Age: 78
End: 2018-08-14
Payer: MEDICARE

## 2018-08-14 DIAGNOSIS — M19.90 SENILE ARTHRITIS: ICD-10-CM

## 2018-08-14 DIAGNOSIS — M10.9 GOUT, UNSPECIFIED: ICD-10-CM

## 2018-08-14 DIAGNOSIS — I25.810 CORONARY ATHEROSCLEROSIS OF AUTOLOGOUS VEIN BYPASS GRAFT: Primary | ICD-10-CM

## 2018-08-14 DIAGNOSIS — N18.30 CHRONIC KIDNEY DISEASE, STAGE III (MODERATE): ICD-10-CM

## 2018-08-14 DIAGNOSIS — R42 DIZZINESS: ICD-10-CM

## 2018-08-14 DIAGNOSIS — R26.89 BALANCE DISORDER: ICD-10-CM

## 2018-08-14 DIAGNOSIS — N40.0 BENIGN ENLARGEMENT OF PROSTATE: ICD-10-CM

## 2018-08-14 DIAGNOSIS — N18.4 CHRONIC KIDNEY DISEASE, STAGE IV (SEVERE): ICD-10-CM

## 2018-08-14 DIAGNOSIS — R53.83 FATIGUE: ICD-10-CM

## 2018-08-14 DIAGNOSIS — I10 ESSENTIAL HYPERTENSION, MALIGNANT: ICD-10-CM

## 2018-08-14 LAB
ALBUMIN SERPL BCP-MCNC: 3.8 G/DL
ALP SERPL-CCNC: 67 U/L
ALT SERPL W/O P-5'-P-CCNC: 32 U/L
ANION GAP SERPL CALC-SCNC: 10 MMOL/L
AST SERPL-CCNC: 18 U/L
BASOPHILS # BLD AUTO: 0 K/UL
BASOPHILS NFR BLD: 0.3 %
BILIRUB SERPL-MCNC: 0.3 MG/DL
BUN SERPL-MCNC: 56 MG/DL
CALCIUM SERPL-MCNC: 10 MG/DL
CHLORIDE SERPL-SCNC: 105 MMOL/L
CO2 SERPL-SCNC: 25 MMOL/L
CREAT SERPL-MCNC: 5.9 MG/DL
DIFFERENTIAL METHOD: ABNORMAL
EOSINOPHIL # BLD AUTO: 0.3 K/UL
EOSINOPHIL NFR BLD: 5.2 %
ERYTHROCYTE [DISTWIDTH] IN BLOOD BY AUTOMATED COUNT: 14.1 %
EST. GFR  (AFRICAN AMERICAN): 10 ML/MIN/1.73 M^2
EST. GFR  (NON AFRICAN AMERICAN): 8 ML/MIN/1.73 M^2
FERRITIN SERPL-MCNC: 87 NG/ML
GLUCOSE SERPL-MCNC: 89 MG/DL
HCT VFR BLD AUTO: 30.5 %
HGB BLD-MCNC: 10 G/DL
IRON SERPL-MCNC: 59 UG/DL
LYMPHOCYTES # BLD AUTO: 1.4 K/UL
LYMPHOCYTES NFR BLD: 23.9 %
MCH RBC QN AUTO: 30.5 PG
MCHC RBC AUTO-ENTMCNC: 32.8 G/DL
MCV RBC AUTO: 93 FL
MONOCYTES # BLD AUTO: 0.5 K/UL
MONOCYTES NFR BLD: 7.9 %
NEUTROPHILS # BLD AUTO: 3.6 K/UL
NEUTROPHILS NFR BLD: 62.7 %
PLATELET # BLD AUTO: 174 K/UL
PMV BLD AUTO: 9 FL
POTASSIUM SERPL-SCNC: 4.3 MMOL/L
PROT SERPL-MCNC: 6.9 G/DL
PTH-INTACT SERPL-MCNC: 19.2 PG/ML
RBC # BLD AUTO: 3.27 M/UL
SATURATED IRON: 18 %
SODIUM SERPL-SCNC: 140 MMOL/L
TOTAL IRON BINDING CAPACITY: 336 UG/DL
TRANSFERRIN SERPL-MCNC: 227 MG/DL
WBC # BLD AUTO: 5.8 K/UL

## 2018-08-14 PROCEDURE — 83540 ASSAY OF IRON: CPT

## 2018-08-14 PROCEDURE — 82728 ASSAY OF FERRITIN: CPT

## 2018-08-14 PROCEDURE — 97112 NEUROMUSCULAR REEDUCATION: CPT | Mod: PN

## 2018-08-14 PROCEDURE — 80053 COMPREHEN METABOLIC PANEL: CPT

## 2018-08-14 PROCEDURE — 83970 ASSAY OF PARATHORMONE: CPT

## 2018-08-14 PROCEDURE — 97110 THERAPEUTIC EXERCISES: CPT | Mod: PN

## 2018-08-14 PROCEDURE — 36415 COLL VENOUS BLD VENIPUNCTURE: CPT

## 2018-08-14 PROCEDURE — 85025 COMPLETE CBC W/AUTO DIFF WBC: CPT

## 2018-08-14 NOTE — TELEPHONE ENCOUNTER
Spoke to patient who states he is having GI issues. States last time he had these issues he was having a diverticulitis flare up. Patient does not want to go to ER. Wants appointment in office. Accepted appointment tomorrow in [place], once appointment was scheduled patient requested to cancel it due to provider being Anita Long. Patient advised first available in [place] [facility] would be on Friday 8-17-18, [place] appointment would be sooner. Patient declined appointment again in [place] and accepted first available appointment in [facility].           ----- Message from Jarret Frederick sent at 8/14/2018  1:50 PM CDT -----  Contact: Pt Aly Taylor   Pt would like the nurse to give him a call back in regards to getting a urgent appointment .. Pt stated she is having a flare up .. Contact number [phone]....

## 2018-08-14 NOTE — TELEPHONE ENCOUNTER
Patient notified Valsartan discontinued/new order for Losartan-Hydrochlorothiazide placed. Verbalized understanding.

## 2018-08-15 ENCOUNTER — TELEPHONE (OUTPATIENT)
Dept: FAMILY MEDICINE | Facility: CLINIC | Age: 78
End: 2018-08-15

## 2018-08-16 ENCOUNTER — TELEPHONE (OUTPATIENT)
Dept: FAMILY MEDICINE | Facility: CLINIC | Age: 78
End: 2018-08-16

## 2018-08-16 RX ORDER — GABAPENTIN 100 MG/1
100 CAPSULE ORAL NIGHTLY
Qty: 90 CAPSULE | Refills: 0 | Status: SHIPPED | OUTPATIENT
Start: 2018-08-16 | End: 2018-08-22 | Stop reason: SDUPTHER

## 2018-08-17 ENCOUNTER — LAB VISIT (OUTPATIENT)
Dept: LAB | Facility: HOSPITAL | Age: 78
End: 2018-08-17
Attending: PHYSICIAN ASSISTANT
Payer: MEDICARE

## 2018-08-17 ENCOUNTER — OFFICE VISIT (OUTPATIENT)
Dept: FAMILY MEDICINE | Facility: CLINIC | Age: 78
End: 2018-08-17
Payer: MEDICARE

## 2018-08-17 ENCOUNTER — HOSPITAL ENCOUNTER (OUTPATIENT)
Dept: RADIOLOGY | Facility: HOSPITAL | Age: 78
Discharge: HOME | End: 2018-08-17
Attending: PHYSICIAN ASSISTANT
Payer: MEDICARE

## 2018-08-17 VITALS
BODY MASS INDEX: 28.11 KG/M2 | TEMPERATURE: 98 F | DIASTOLIC BLOOD PRESSURE: 92 MMHG | HEIGHT: 73 IN | HEART RATE: 61 BPM | RESPIRATION RATE: 16 BRPM | SYSTOLIC BLOOD PRESSURE: 140 MMHG | OXYGEN SATURATION: 98 % | WEIGHT: 212.06 LBS

## 2018-08-17 DIAGNOSIS — R10.84 GENERALIZED ABDOMINAL PAIN: ICD-10-CM

## 2018-08-17 DIAGNOSIS — R31.9 HEMATURIA, UNSPECIFIED TYPE: ICD-10-CM

## 2018-08-17 DIAGNOSIS — R10.9 ABDOMINAL PAIN, UNSPECIFIED ABDOMINAL LOCATION: ICD-10-CM

## 2018-08-17 DIAGNOSIS — R05.8 PRODUCTIVE COUGH: ICD-10-CM

## 2018-08-17 DIAGNOSIS — R10.9 ABDOMINAL PAIN, UNSPECIFIED ABDOMINAL LOCATION: Primary | ICD-10-CM

## 2018-08-17 DIAGNOSIS — R30.0 DYSURIA: ICD-10-CM

## 2018-08-17 DIAGNOSIS — J18.9 PNEUMONIA OF RIGHT LOWER LOBE DUE TO INFECTIOUS ORGANISM: ICD-10-CM

## 2018-08-17 DIAGNOSIS — I10 ESSENTIAL HYPERTENSION: ICD-10-CM

## 2018-08-17 PROBLEM — Z86.010 HISTORY OF COLON POLYPS: Status: RESOLVED | Noted: 2018-05-03 | Resolved: 2018-08-17

## 2018-08-17 LAB
BILIRUB SERPL-MCNC: ABNORMAL MG/DL
BLOOD URINE, POC: 250
COLOR, POC UA: YELLOW
GLUCOSE UR QL STRIP: NORMAL
KETONES UR QL STRIP: ABNORMAL
LEUKOCYTE ESTERASE URINE, POC: ABNORMAL
NITRITE, POC UA: ABNORMAL
PH, POC UA: 5
PROTEIN, POC: ABNORMAL
SPECIFIC GRAVITY, POC UA: 1.01
UROBILINOGEN, POC UA: NORMAL

## 2018-08-17 PROCEDURE — 71046 X-RAY EXAM CHEST 2 VIEWS: CPT | Mod: TC,FY

## 2018-08-17 PROCEDURE — 96372 THER/PROPH/DIAG INJ SC/IM: CPT | Mod: PBBFAC,PO

## 2018-08-17 PROCEDURE — 81002 URINALYSIS NONAUTO W/O SCOPE: CPT | Mod: PBBFAC,PO | Performed by: PHYSICIAN ASSISTANT

## 2018-08-17 PROCEDURE — 71046 X-RAY EXAM CHEST 2 VIEWS: CPT | Mod: 26,,, | Performed by: RADIOLOGY

## 2018-08-17 PROCEDURE — 99999 PR PBB SHADOW E&M-EST. PATIENT-LVL V: CPT | Mod: PBBFAC,,, | Performed by: PHYSICIAN ASSISTANT

## 2018-08-17 PROCEDURE — 87086 URINE CULTURE/COLONY COUNT: CPT

## 2018-08-17 PROCEDURE — 74176 CT ABD & PELVIS W/O CONTRAST: CPT | Mod: 26,,, | Performed by: RADIOLOGY

## 2018-08-17 PROCEDURE — 74176 CT ABD & PELVIS W/O CONTRAST: CPT | Mod: TC

## 2018-08-17 PROCEDURE — 99215 OFFICE O/P EST HI 40 MIN: CPT | Mod: PBBFAC,PO | Performed by: PHYSICIAN ASSISTANT

## 2018-08-17 PROCEDURE — 99214 OFFICE O/P EST MOD 30 MIN: CPT | Mod: S$PBB,,, | Performed by: PHYSICIAN ASSISTANT

## 2018-08-17 RX ORDER — AMLODIPINE BESYLATE 10 MG/1
10 TABLET ORAL NIGHTLY
Qty: 90 TABLET | Refills: 1 | Status: SHIPPED | OUTPATIENT
Start: 2018-08-17 | End: 2019-01-31

## 2018-08-17 RX ORDER — CEFTRIAXONE 500 MG/1
500 INJECTION, POWDER, FOR SOLUTION INTRAMUSCULAR; INTRAVENOUS
Status: COMPLETED | OUTPATIENT
Start: 2018-08-17 | End: 2018-08-17

## 2018-08-17 RX ORDER — DOXYCYCLINE 100 MG/1
100 CAPSULE ORAL EVERY 12 HOURS
Qty: 20 CAPSULE | Refills: 0 | Status: SHIPPED | OUTPATIENT
Start: 2018-08-17 | End: 2018-09-04 | Stop reason: ALTCHOICE

## 2018-08-17 RX ADMIN — CEFTRIAXONE SODIUM 500 MG: 1 INJECTION, POWDER, FOR SOLUTION INTRAMUSCULAR; INTRAVENOUS at 04:08

## 2018-08-17 NOTE — TELEPHONE ENCOUNTER
Attempted to call patient x3. Message left on recorder asking patient to call back for results. Also attempted to call patient's daughter. There was no answer.

## 2018-08-17 NOTE — PROGRESS NOTES
Subjective:       Patient ID: Micheal Hunt is a 77 y.o. male.    Chief Complaint: Abdominal Pain; Headache; and Cough    Cough   This is a new problem. The current episode started 1 to 4 weeks ago. The problem has been gradually worsening. The problem occurs every few minutes. The cough is productive of purulent sputum. Associated symptoms include chills and headaches. Pertinent negatives include no chest pain, ear pain, fever, heartburn, myalgias, postnasal drip, rash, rhinorrhea, shortness of breath, weight loss or wheezing. Nothing aggravates the symptoms. He has tried nothing for the symptoms. His past medical history is significant for pneumonia. history of diverticulitis   Abdominal Pain   This is a new problem. The current episode started in the past 7 days. The problem occurs daily. The problem has been gradually worsening. The pain is located in the generalized abdominal region. The pain is moderate. The abdominal pain does not radiate. Associated symptoms include arthralgias, dysuria, headaches and hematuria. Pertinent negatives include no constipation, diarrhea, fever, myalgias, nausea, vomiting or weight loss. The pain is relieved by nothing. He has tried nothing for the symptoms.     Review of Systems   Constitutional: Positive for activity change and chills. Negative for appetite change, fever and weight loss.   HENT: Negative for ear pain, postnasal drip, rhinorrhea and sinus pressure.    Eyes: Negative for visual disturbance.   Respiratory: Positive for cough. Negative for shortness of breath and wheezing.    Cardiovascular: Negative for chest pain.   Gastrointestinal: Positive for abdominal pain. Negative for abdominal distention, constipation, diarrhea, heartburn, nausea and vomiting.   Genitourinary: Positive for dysuria and hematuria. Negative for difficulty urinating.   Musculoskeletal: Positive for arthralgias. Negative for myalgias.   Skin: Negative for rash.   Neurological: Positive  for headaches.   Hematological: Negative for adenopathy.   Psychiatric/Behavioral: The patient is not nervous/anxious.        Objective:      Physical Exam   Constitutional: He is oriented to person, place, and time.   Patient appears ill   HENT:   Mouth/Throat: Oropharynx is clear and moist. No oropharyngeal exudate.   Eyes: Conjunctivae are normal. Pupils are equal, round, and reactive to light.   Cardiovascular: Normal rate and regular rhythm.   Pulmonary/Chest: Effort normal and breath sounds normal. He has no wheezes.   Abdominal: Soft. Bowel sounds are normal. There is tenderness.   Musculoskeletal: He exhibits no edema.   Lymphadenopathy:     He has no cervical adenopathy.   Neurological: He is alert and oriented to person, place, and time.   Skin: No erythema.   Psychiatric: His behavior is normal.       Assessment:       1. Abdominal pain, unspecified abdominal location    2. Productive cough    3. Dysuria    4. Hematuria, unspecified type    5. Generalized abdominal pain    6. Essential hypertension        Plan:       Micheal was seen today for abdominal pain, headache and cough.    Diagnoses and all orders for this visit:    Abdominal pain, unspecified abdominal location  -     POCT URINE DIPSTICK WITHOUT MICROSCOPE  -     Urine culture; Future  -     CT Abdomen Pelvis  Without Contrast; Future  -     CBC auto differential; Future  -     Comprehensive metabolic panel; Future    Productive cough  -     X-Ray Chest PA And Lateral; Future  -     CBC auto differential; Future    Dysuria  -     POCT URINE DIPSTICK WITHOUT MICROSCOPE  -     Urine culture; Future  -     CT Abdomen Pelvis  Without Contrast; Future    Hematuria, unspecified type  -     CT Abdomen Pelvis  Without Contrast; Future    Generalized abdominal pain  -     CT Abdomen Pelvis  Without Contrast; Future    Essential hypertension  -     amLODIPine (NORVASC) 10 MG tablet; Take 1 tablet (10 mg total) by mouth every evening.  Low salt diet  Increase  amlodipine  Stop losartan/ hctz  Other orders  -     amLODIPine (NORVASC) 10 MG tablet; Take 1 tablet (10 mg total) by mouth every evening.    Patient will be advised of results and further treatment when results return.

## 2018-08-17 NOTE — PROGRESS NOTES
"Name: Micheal Acosta [facility] Number: [id number]  Date of Treatment: 08/14/2018  Diagnosis:   Encounter Diagnosis   Name Primary?    Dizziness        Time in: 0908  Time Out: 0955  Total Treatment Time: 38  Group Time: 0      Subjective:    Micheal reports "Hasn't been a bad week".  Patient reports their pain to be n/a/10 on a 0-10 scale with 0 being no pain and 10 being the worst pain imaginable.    Objective    Patient received individual therapy to improve dizziness, improve balance, improve gait stability with 0 patients with activities as follows:     Micheal received therapeutic exercises to improve dizziness and flexibility for 8 minutes including:    [place] exercises:   Repetitive eye moves side<>side x 30          Repetitive eye moves up<>down x 20        Repetitive head turns side <> side x 20        Repetitive head turns up <> down x 20    Standing gastroc stretch 3x30s    Patient participated in neuromuscular re-education activities to improve: Balance and Coordination for 30 minutes. The following activities were included:    SLS 3x30s ea   Tandem stance 3x30s ea   Gait with head turns side <> side 2x30'   Gait with head turns up <> down 2x30'   Tandem walk 2x30'   High step 2x30'   Side step R/L x 30' ea     Written Home Exercises Provided: for [place] exercises  Pt demo good understanding of the education provided. Micheal demonstrated good return demonstration of activities.     Assessment:   Difficulty with SLS and tandem stance/tandem walk.  But otherwise did well.      Pt will continue to benefit from skilled PT intervention. Medical Necessity is demonstrated by:  Fall Risk, Unable to participate fully in daily activities, Requires skilled supervision to complete and progress HEP and Balance disturbance.    Patient is making good progress towards established goals.    New/Revised goals: N/A      Plan:  Continue with established Plan of Care towards PT goals.   "

## 2018-08-19 LAB — BACTERIA UR CULT: NORMAL

## 2018-08-20 ENCOUNTER — PATIENT MESSAGE (OUTPATIENT)
Dept: FAMILY MEDICINE | Facility: CLINIC | Age: 78
End: 2018-08-20

## 2018-08-20 ENCOUNTER — TELEPHONE (OUTPATIENT)
Dept: FAMILY MEDICINE | Facility: CLINIC | Age: 78
End: 2018-08-20

## 2018-08-20 NOTE — TELEPHONE ENCOUNTER
Urine culture grew no bacteria so there is no evidence of UTI. If patient is afebrile and been on antibiotics for 48-72 hours, he should not be contagious. I recommend covering mouth when coughing, washing hands frequently and not sharing drinks, etc.

## 2018-08-22 RX ORDER — GABAPENTIN 100 MG/1
100 CAPSULE ORAL NIGHTLY
Qty: 90 CAPSULE | Refills: 4 | Status: SHIPPED | OUTPATIENT
Start: 2018-08-22 | End: 2018-09-11

## 2018-08-23 ENCOUNTER — CLINICAL SUPPORT (OUTPATIENT)
Dept: REHABILITATION | Facility: HOSPITAL | Age: 78
End: 2018-08-23
Payer: MEDICARE

## 2018-08-23 DIAGNOSIS — R42 DIZZINESS: ICD-10-CM

## 2018-08-23 PROCEDURE — 97110 THERAPEUTIC EXERCISES: CPT | Mod: PN

## 2018-08-24 ENCOUNTER — PATIENT MESSAGE (OUTPATIENT)
Dept: FAMILY MEDICINE | Facility: CLINIC | Age: 78
End: 2018-08-24

## 2018-08-24 ENCOUNTER — TELEPHONE (OUTPATIENT)
Dept: REHABILITATION | Facility: HOSPITAL | Age: 78
End: 2018-08-24

## 2018-08-24 NOTE — TELEPHONE ENCOUNTER
Pt called asking to speak with Neelam.  I returned his call.  He stated that he had increased dizziness after last session and had to sit in parking lot for about an hour before traveling home.  Has gotten better since. He reports that he has only been performing the eye movement exercises at home.  Instructed pt to perform head turns for HEP.  He also reports that neck discomfort is limiting his ability to perform neck exercises at home.  Requested for PT to address cervical pain.  I instructed pt that he would need to request orders from his MD in order to add this to his POC.  He verbalized understanding.  Confirmed his next appt for Tuesday 8/28.

## 2018-08-28 ENCOUNTER — CLINICAL SUPPORT (OUTPATIENT)
Dept: REHABILITATION | Facility: HOSPITAL | Age: 78
End: 2018-08-28
Attending: NURSE PRACTITIONER
Payer: MEDICARE

## 2018-08-28 DIAGNOSIS — R42 DIZZINESS: ICD-10-CM

## 2018-08-28 PROCEDURE — 97112 NEUROMUSCULAR REEDUCATION: CPT | Mod: PN

## 2018-08-28 PROCEDURE — 97110 THERAPEUTIC EXERCISES: CPT | Mod: PN

## 2018-08-28 NOTE — PROGRESS NOTES
Name: Micheal Acosta [facility] Number: [id number]  Date of Treatment: 08/28/2018   Diagnosis:   Encounter Diagnosis   Name Primary?    Dizziness        Time in: 1100  Time Out: 1145  Total Treatment Time: 45      Subjective:    Micheal reports pain with cervical ROM.  He reported he has been performing eye HEP.  Patient reports their pain to be 0/10 on a 0-10 scale with 0 being no pain and 10 being the worst pain imaginable.    Objective    Patient received individual therapy to increase flexibility and balance with activities as follows:     Micheal received therapeutic exercises to develoPatient participated in dynamic functional therapeutic activities to improve functional performance for 15 minutes. Including:     Standing gastroc stretch 3 x 30 sec B  SLS 3 x 20 sec R/L each    Patient participated in neuromuscular re-education activities to improve: Balance for 30 minutes. The following activities were included:     Focusing on object 4 feet away: eyes sup/inf, R/L x 20 each           head flex/ext, rotation x 20 each  Gait with cervical flex/ext, rotation 30' x 2 each  High stepping 30' x 2  Lateral stepping 30' x 2  Tandem walking 30' x 2  Tandem stance 4 x 20 sec     Written Home Exercises Provided: cont HEP  Pt demo good understanding of the education provided. Micheal demonstrated fair return demonstration of activities.     Assessment:       Pt will continue to benefit from skilled PT intervention. Medical Necessity is demonstrated by:  Fall Risk, Pain limits function of effected part for some activities, Unable to participate fully in daily activities, Requires skilled supervision to complete and progress HEP and Weakness.    Patient is making fair progress towards established goals.      Plan:  Continue with established Plan of Care towards PT goals.

## 2018-08-29 ENCOUNTER — HOSPITAL ENCOUNTER (INPATIENT)
Facility: HOSPITAL | Age: 78
LOS: 1 days | Discharge: HOME | DRG: 292 | End: 2018-08-30
Attending: EMERGENCY MEDICINE | Admitting: HOSPITALIST
Payer: MEDICARE

## 2018-08-29 ENCOUNTER — OFFICE VISIT (OUTPATIENT)
Dept: UROLOGY | Facility: CLINIC | Age: 78
End: 2018-08-29
Payer: MEDICARE

## 2018-08-29 VITALS
TEMPERATURE: 98 F | HEIGHT: 73 IN | BODY MASS INDEX: 28.25 KG/M2 | WEIGHT: 213.19 LBS | HEART RATE: 47 BPM | DIASTOLIC BLOOD PRESSURE: 54 MMHG | SYSTOLIC BLOOD PRESSURE: 85 MMHG | RESPIRATION RATE: 18 BRPM

## 2018-08-29 DIAGNOSIS — R55 PRE-SYNCOPE: ICD-10-CM

## 2018-08-29 DIAGNOSIS — R00.1 SYMPTOMATIC BRADYCARDIA: Primary | ICD-10-CM

## 2018-08-29 DIAGNOSIS — R00.1 BRADYCARDIA: ICD-10-CM

## 2018-08-29 DIAGNOSIS — R53.83 FATIGUE: ICD-10-CM

## 2018-08-29 DIAGNOSIS — R97.20 ELEVATED PROSTATE SPECIFIC ANTIGEN (PSA): Primary | ICD-10-CM

## 2018-08-29 PROBLEM — N25.81 SECONDARY HYPERPARATHYROIDISM: Status: ACTIVE | Noted: 2018-08-29

## 2018-08-29 LAB
ALBUMIN SERPL BCP-MCNC: 3.8 G/DL
ALP SERPL-CCNC: 63 U/L
ALT SERPL W/O P-5'-P-CCNC: 21 U/L
ANION GAP SERPL CALC-SCNC: 11 MMOL/L
AST SERPL-CCNC: 17 U/L
BASOPHILS # BLD AUTO: 0 K/UL
BASOPHILS NFR BLD: 0.3 %
BILIRUB SERPL-MCNC: 0.4 MG/DL
BUN SERPL-MCNC: 82 MG/DL
CALCIUM SERPL-MCNC: 10 MG/DL
CHLORIDE SERPL-SCNC: 100 MMOL/L
CO2 SERPL-SCNC: 24 MMOL/L
CREAT SERPL-MCNC: 6.5 MG/DL
DIFFERENTIAL METHOD: ABNORMAL
EOSINOPHIL # BLD AUTO: 0.3 K/UL
EOSINOPHIL NFR BLD: 5.1 %
ERYTHROCYTE [DISTWIDTH] IN BLOOD BY AUTOMATED COUNT: 13.9 %
EST. GFR  (AFRICAN AMERICAN): 9 ML/MIN/1.73 M^2
EST. GFR  (NON AFRICAN AMERICAN): 7 ML/MIN/1.73 M^2
GLUCOSE SERPL-MCNC: 119 MG/DL
HCT VFR BLD AUTO: 31.1 %
HGB BLD-MCNC: 10.1 G/DL
LYMPHOCYTES # BLD AUTO: 1.8 K/UL
LYMPHOCYTES NFR BLD: 27.8 %
MCH RBC QN AUTO: 30 PG
MCHC RBC AUTO-ENTMCNC: 32.4 G/DL
MCV RBC AUTO: 93 FL
MONOCYTES # BLD AUTO: 0.4 K/UL
MONOCYTES NFR BLD: 6.2 %
NEUTROPHILS # BLD AUTO: 4 K/UL
NEUTROPHILS NFR BLD: 60.6 %
PLATELET # BLD AUTO: 160 K/UL
PMV BLD AUTO: 8.2 FL
POTASSIUM SERPL-SCNC: 3.8 MMOL/L
PROT SERPL-MCNC: 6.8 G/DL
RBC # BLD AUTO: 3.36 M/UL
SODIUM SERPL-SCNC: 135 MMOL/L
TROPONIN I SERPL DL<=0.01 NG/ML-MCNC: 0.04 NG/ML
TSH SERPL DL<=0.005 MIU/L-ACNC: 1.89 UIU/ML
WBC # BLD AUTO: 6.6 K/UL

## 2018-08-29 PROCEDURE — 63600175 PHARM REV CODE 636 W HCPCS: Performed by: NURSE PRACTITIONER

## 2018-08-29 PROCEDURE — 94760 N-INVAS EAR/PLS OXIMETRY 1: CPT

## 2018-08-29 PROCEDURE — 99284 EMERGENCY DEPT VISIT MOD MDM: CPT | Mod: 25

## 2018-08-29 PROCEDURE — 99499 UNLISTED E&M SERVICE: CPT | Mod: S$PBB,,, | Performed by: UROLOGY

## 2018-08-29 PROCEDURE — 85025 COMPLETE CBC W/AUTO DIFF WBC: CPT

## 2018-08-29 PROCEDURE — 25000003 PHARM REV CODE 250: Performed by: NURSE PRACTITIONER

## 2018-08-29 PROCEDURE — 12000002 HC ACUTE/MED SURGE SEMI-PRIVATE ROOM

## 2018-08-29 PROCEDURE — 99900035 HC TECH TIME PER 15 MIN (STAT)

## 2018-08-29 PROCEDURE — 99999 PR PBB SHADOW E&M-EST. PATIENT-LVL III: CPT | Mod: PBBFAC,,, | Performed by: UROLOGY

## 2018-08-29 PROCEDURE — 96372 THER/PROPH/DIAG INJ SC/IM: CPT

## 2018-08-29 PROCEDURE — 80053 COMPREHEN METABOLIC PANEL: CPT

## 2018-08-29 PROCEDURE — 84443 ASSAY THYROID STIM HORMONE: CPT

## 2018-08-29 PROCEDURE — 99213 OFFICE O/P EST LOW 20 MIN: CPT | Mod: PBBFAC,25,27,PN | Performed by: UROLOGY

## 2018-08-29 PROCEDURE — 36415 COLL VENOUS BLD VENIPUNCTURE: CPT

## 2018-08-29 PROCEDURE — 84484 ASSAY OF TROPONIN QUANT: CPT

## 2018-08-29 RX ORDER — GABAPENTIN 100 MG/1
100 CAPSULE ORAL NIGHTLY
Status: DISCONTINUED | OUTPATIENT
Start: 2018-08-29 | End: 2018-08-30 | Stop reason: HOSPADM

## 2018-08-29 RX ORDER — LANOLIN ALCOHOL/MO/W.PET/CERES
800 CREAM (GRAM) TOPICAL
Status: DISCONTINUED | OUTPATIENT
Start: 2018-08-29 | End: 2018-08-30 | Stop reason: HOSPADM

## 2018-08-29 RX ORDER — SODIUM,POTASSIUM PHOSPHATES 280-250MG
2 POWDER IN PACKET (EA) ORAL
Status: DISCONTINUED | OUTPATIENT
Start: 2018-08-29 | End: 2018-08-30 | Stop reason: HOSPADM

## 2018-08-29 RX ORDER — MECLIZINE HYDROCHLORIDE 25 MG/1
TABLET ORAL DAILY PRN
COMMUNITY
Start: 2018-08-12 | End: 2018-09-18 | Stop reason: SDUPTHER

## 2018-08-29 RX ORDER — SODIUM CHLORIDE 0.9 % (FLUSH) 0.9 %
5 SYRINGE (ML) INJECTION
Status: DISCONTINUED | OUTPATIENT
Start: 2018-08-29 | End: 2018-08-30 | Stop reason: HOSPADM

## 2018-08-29 RX ORDER — HEPARIN SODIUM 5000 [USP'U]/ML
5000 INJECTION, SOLUTION INTRAVENOUS; SUBCUTANEOUS EVERY 8 HOURS
Status: DISCONTINUED | OUTPATIENT
Start: 2018-08-29 | End: 2018-08-30 | Stop reason: HOSPADM

## 2018-08-29 RX ORDER — ONDANSETRON 2 MG/ML
8 INJECTION INTRAMUSCULAR; INTRAVENOUS EVERY 8 HOURS PRN
Status: DISCONTINUED | OUTPATIENT
Start: 2018-08-29 | End: 2018-08-30 | Stop reason: HOSPADM

## 2018-08-29 RX ORDER — FLUTICASONE PROPIONATE 50 MCG
2 SPRAY, SUSPENSION (ML) NASAL DAILY
Status: DISCONTINUED | OUTPATIENT
Start: 2018-08-29 | End: 2018-08-30 | Stop reason: HOSPADM

## 2018-08-29 RX ORDER — POTASSIUM CHLORIDE 20 MEQ/15ML
40 SOLUTION ORAL
Status: DISCONTINUED | OUTPATIENT
Start: 2018-08-29 | End: 2018-08-30 | Stop reason: HOSPADM

## 2018-08-29 RX ORDER — FINASTERIDE 5 MG/1
5 TABLET, FILM COATED ORAL DAILY
Status: DISCONTINUED | OUTPATIENT
Start: 2018-08-29 | End: 2018-08-30 | Stop reason: HOSPADM

## 2018-08-29 RX ORDER — CALCITRIOL 0.5 UG/1
CAPSULE ORAL
Status: ON HOLD | COMMUNITY
Start: 2018-08-14 | End: 2018-08-29

## 2018-08-29 RX ORDER — CARVEDILOL 12.5 MG/1
6.25 TABLET ORAL DAILY
Status: ON HOLD | COMMUNITY
Start: 2018-08-20 | End: 2018-08-30 | Stop reason: HOSPADM

## 2018-08-29 RX ORDER — ISOSORBIDE MONONITRATE 10 MG/1
10 TABLET ORAL NIGHTLY
Status: DISCONTINUED | OUTPATIENT
Start: 2018-08-29 | End: 2018-08-30 | Stop reason: HOSPADM

## 2018-08-29 RX ORDER — IBUPROFEN 200 MG
16 TABLET ORAL
Status: DISCONTINUED | OUTPATIENT
Start: 2018-08-29 | End: 2018-08-30 | Stop reason: HOSPADM

## 2018-08-29 RX ORDER — ATORVASTATIN CALCIUM 40 MG/1
80 TABLET, FILM COATED ORAL DAILY
Status: DISCONTINUED | OUTPATIENT
Start: 2018-08-29 | End: 2018-08-30 | Stop reason: HOSPADM

## 2018-08-29 RX ORDER — IPRATROPIUM BROMIDE AND ALBUTEROL SULFATE 2.5; .5 MG/3ML; MG/3ML
3 SOLUTION RESPIRATORY (INHALATION) EVERY 4 HOURS PRN
Status: DISCONTINUED | OUTPATIENT
Start: 2018-08-29 | End: 2018-08-29

## 2018-08-29 RX ORDER — GLUCAGON 1 MG
1 KIT INJECTION
Status: DISCONTINUED | OUTPATIENT
Start: 2018-08-29 | End: 2018-08-30 | Stop reason: HOSPADM

## 2018-08-29 RX ORDER — ASPIRIN 81 MG/1
81 TABLET ORAL DAILY
Status: DISCONTINUED | OUTPATIENT
Start: 2018-08-29 | End: 2018-08-30 | Stop reason: HOSPADM

## 2018-08-29 RX ORDER — TAMSULOSIN HYDROCHLORIDE 0.4 MG/1
0.4 CAPSULE ORAL DAILY
Status: DISCONTINUED | OUTPATIENT
Start: 2018-08-29 | End: 2018-08-29

## 2018-08-29 RX ORDER — AMLODIPINE BESYLATE 5 MG/1
10 TABLET ORAL NIGHTLY
Status: DISCONTINUED | OUTPATIENT
Start: 2018-08-29 | End: 2018-08-30 | Stop reason: HOSPADM

## 2018-08-29 RX ORDER — IPRATROPIUM BROMIDE AND ALBUTEROL SULFATE 2.5; .5 MG/3ML; MG/3ML
3 SOLUTION RESPIRATORY (INHALATION) EVERY 4 HOURS PRN
Status: DISCONTINUED | OUTPATIENT
Start: 2018-08-29 | End: 2018-08-30 | Stop reason: HOSPADM

## 2018-08-29 RX ORDER — CALCITRIOL 0.25 UG/1
0.5 CAPSULE ORAL DAILY
Status: DISCONTINUED | OUTPATIENT
Start: 2018-08-29 | End: 2018-08-30 | Stop reason: HOSPADM

## 2018-08-29 RX ORDER — IBUPROFEN 200 MG
24 TABLET ORAL
Status: DISCONTINUED | OUTPATIENT
Start: 2018-08-29 | End: 2018-08-30 | Stop reason: HOSPADM

## 2018-08-29 RX ADMIN — CALCITRIOL 0.5 MCG: 0.25 CAPSULE, LIQUID FILLED ORAL at 02:08

## 2018-08-29 RX ADMIN — HEPARIN SODIUM 5000 UNITS: 5000 INJECTION, SOLUTION INTRAVENOUS; SUBCUTANEOUS at 08:08

## 2018-08-29 RX ADMIN — FINASTERIDE 5 MG: 5 TABLET, FILM COATED ORAL at 02:08

## 2018-08-29 RX ADMIN — ATORVASTATIN CALCIUM 80 MG: 40 TABLET, FILM COATED ORAL at 02:08

## 2018-08-29 RX ADMIN — ISOSORBIDE MONONITRATE 10 MG: 10 TABLET ORAL at 08:08

## 2018-08-29 RX ADMIN — ASPIRIN 81 MG: 81 TABLET, COATED ORAL at 02:08

## 2018-08-29 RX ADMIN — AMLODIPINE BESYLATE 10 MG: 5 TABLET ORAL at 08:08

## 2018-08-29 RX ADMIN — GABAPENTIN 100 MG: 100 CAPSULE ORAL at 08:08

## 2018-08-29 NOTE — ED NOTES
Bradycardia noted. Pt denies any sx. BP WNL. ED work up in progress. No other needs are identified. Will monitor prn.

## 2018-08-29 NOTE — NURSING
Patient received to unit in NAD. VSS. Fall, allergy, and limb alert bracelets placed. Non-skid socks on. Fall agreement reviewed and placed in chart. Call light in reach. Oriented to room.

## 2018-08-29 NOTE — ED PROVIDER NOTES
"Encounter Date: 8/29/2018    SCRIBE #1 NOTE: I, Divina Caruso, am scribing for, and in the presence of, Reyna Prasad PA-C.       History     Chief Complaint   Patient presents with    Fatigue     upper abdominal pain x 30 minutes.       Time seen by provider: 10:27 AM on 08/29/2018    Micheal Hunt is a 77 y.o. male with HTN, HLD, CAD who presents to the ED with an onset of fatigue. He was at his Urology appointment with Dr. Dave Payne PTA when he suddenly felt light-headed and felt like he was going to pass out. The patient laid down and found to be bradycardic. After laying down, he reports feeling improved. The patient was brought to the ER for further evaluation. He reports feeling "fine" yesterday until he became nauseated after eating dinner last night. The patient states that his nausea was resolved when he woke up this morning but reports having abdominal pain earlier today. He has been eating and drinking normally including eating breakfast this morning. His last normal BM was yesterday. The patient states that he checks his BP "all the time" as well as his HR, which is usually 60. His medications were recently changed but states that he has not started the new medication yet. He reports his norvasc was increased to 10 mg but he hasn't started. Currently, he states, "I feel okay now, but I feel tired." His Cardiologist is Dr. Getachew Dunlap. The patient denies blurry vision, trouble breathing, chest pain, vomiting, difficulty or burning with urination, HA, passing out or any other symptoms at this time. He has a SHx including a CABG. Ace inhibitors, Eplerenone, Arb-angiotensin receptor antagonist and Sulfa drug allergies noted.      The history is provided by the patient.     Review of patient's allergies indicates:   Allergen Reactions    Ace inhibitors Other (See Comments)     Cough    Arb-angiotensin receptor antagonist Itching    Eplerenone Other (See Comments)     Marked bradycardia, 40, " tiredness and weakness      Sulfa (sulfonamide antibiotics) Itching     Patient says this was 10 years ago and doesn't remember what happened     Past Medical History:   Diagnosis Date    Allergy     Anemia, mild 12/15/2014    Arthritis     Gout    Benign essential HTN 3/27/2012    BPH (benign prostatic hyperplasia)     CAD (coronary artery disease) 2006    Chronic kidney disease     due to ibuprofen    Colon polyp     Diverticulosis     Gastritis     GERD (gastroesophageal reflux disease)     History of colon polyps 5/3/2018    HTN (hypertension) 3/27/2012    Hyperlipidemia     LLL pneumonia 2018    LVH (left ventricular hypertrophy)     Mesenteric ischemia     Murmur, cardiac 3/27/2012    GRICELDA (obstructive sleep apnea)     DOES NOT USE A MACHINE    Sinus problem     Syncope and collapse      Past Surgical History:   Procedure Laterality Date    APPENDECTOMY      CARDIAC CATHETERIZATION      COLONOSCOPY      CORONARY ARTERY BYPASS GRAFT  4/2007    x 1    JOINT REPLACEMENT      left knee total replacement  X 3    mid leftt finger      from a cactuss    MOLE REMOVAL      rotative cuff      no rotative cuffs on bilat shoulders has pins     SHOULDER SURGERY      shoulder surgery bilat  RIGHT X 4; LEFT X 3     Family History   Problem Relation Age of Onset    Heart disease Mother     Sudden death Father     Cancer Father         advanced lung ca- found after 2 story fall    Stroke Sister     Pneumonia Sister          from PNA    Heart disease Sister     Hypertension Brother     Kidney cancer Neg Hx     Prostate cancer Neg Hx     Urolithiasis Neg Hx     Allergic rhinitis Neg Hx     Allergies Neg Hx     Angioedema Neg Hx     Asthma Neg Hx     Atopy Neg Hx     Eczema Neg Hx     Immunodeficiency Neg Hx     Rhinitis Neg Hx     Urticaria Neg Hx      Social History     Tobacco Use    Smoking status: Never Smoker    Smokeless tobacco: Never Used   Substance Use  Topics    Alcohol use: No    Drug use: No     Review of Systems   Constitutional: Positive for fatigue. Negative for activity change, appetite change, chills and fever.   HENT: Negative for congestion, rhinorrhea and sore throat.    Eyes: Negative for redness and visual disturbance.   Respiratory: Negative for cough, chest tightness and shortness of breath.    Cardiovascular: Negative for chest pain.   Gastrointestinal: Positive for abdominal pain (resolved) and nausea (resolved). Negative for diarrhea and vomiting.   Genitourinary: Negative for difficulty urinating, dysuria and frequency.   Musculoskeletal: Negative for back pain, neck pain and neck stiffness.   Skin: Negative for rash.   Neurological: Positive for light-headedness (resolved). Negative for dizziness, syncope, numbness and headaches.     Physical Exam     Initial Vitals [08/29/18 1020]   BP Pulse Resp Temp SpO2   (!) 94/53 (!) 48 16 97.4 °F (36.3 °C) 100 %      MAP       --         Physical Exam    Nursing note and vitals reviewed.  Constitutional: Vital signs are normal. He appears well-developed and well-nourished. He is cooperative.  Non-toxic appearance. He does not have a sickly appearance.   HENT:   Head: Normocephalic and atraumatic.   Right Ear: Abnromal external ear normal.   Left Ear: Abnormal external ear normal.   Nose: Nose abnormal.   Mouth/Throat: Oropharynx is clear and moist.   Eyes: Conjunctivae and lids are normal. Pupils are equal, round, and reactive to light.   Neck: Normal range of motion and full passive range of motion without pain. Neck supple.   Cardiovascular: Regular rhythm and normal heart sounds. Bradycardia present.  Exam reveals no gallop and no friction rub.    No murmur heard.  Pulmonary/Chest: Breath sounds normal. He has no wheezes. He has no rhonchi. He has no rales.   Abdominal: Soft. Normal appearance. There is no tenderness. There is no rigidity, no rebound and no guarding.   Neurological: He is alert and  oriented to person, place, and time.   Skin: Skin is warm, dry and intact. No rash noted.       ED Course   Procedures  Labs Reviewed   CBC W/ AUTO DIFFERENTIAL - Abnormal; Notable for the following components:       Result Value    RBC 3.36 (*)     Hemoglobin 10.1 (*)     Hematocrit 31.1 (*)     MPV 8.2 (*)     All other components within normal limits   COMPREHENSIVE METABOLIC PANEL - Abnormal; Notable for the following components:    Sodium 135 (*)     Glucose 119 (*)     BUN, Bld 82 (*)     Creatinine 6.5 (*)     eGFR if  9 (*)     eGFR if non  7 (*)     All other components within normal limits   TROPONIN I - Abnormal; Notable for the following components:    Troponin I 0.041 (*)     All other components within normal limits   POCT GLUCOSE MONITORING CONTINUOUS     EKG Readings: (Independently Interpreted)   Initial Reading: No STEMI.   Sinus bradycardia at a rate of 46 bpm with a first degree AV block, left axis deviation and new P wave inversions in II & V5. Otherwise, no acute ST changes.      Imaging Results          X-Ray Chest PA And Lateral (Final result)  Result time 08/29/18 11:15:58    Final result by Mayuri Castro MD (08/29/18 11:15:58)                 Impression:      No acute cardiopulmonary disease      Electronically signed by: Mayuri Castro MD  Date:    08/29/2018  Time:    11:15             Narrative:    EXAMINATION:  XR CHEST PA AND LATERAL    CLINICAL HISTORY:  pre-syncope;    TECHNIQUE:  PA and lateral views of the chest were performed.    COMPARISON:  8/17/2018    FINDINGS:  The cardiomediastinal silhouette is stable.  The postoperative changes suggesting CABG in the heart appears mildly enlarged as before.  Thoracic aorta is tortuous.  Lungs are well expanded and clear of infiltrate.  There is no pleural effusion.                                 Medical Decision Making:   History:   Old Medical Records: I decided to obtain old medical  records.  Clinical Tests:   Lab Tests: Reviewed and Ordered  Radiological Study: Reviewed and Ordered  Medical Tests: Reviewed and Ordered       APC / Resident Notes:   Emergent evaluation of a 77-year-old male who presents from Urology secondary to a presyncopal episode.  He is noted to be bradycardic here in the ER.  He reports his symptoms have resolved and he feels better at this time.  He is mildly hypotensive.  He states they have recently changed his blood pressure medications but he has not started the new medications.  Labs are unremarkable. He has a mildly elevated troponin that is consistent with prior elevated troponins.  Suspect this is secondary to his poor renal function. He is not currently on hemodialysis but follows closely with Nephrology.  EKG discussed with Dr. Dunlap who states his symptoms are likely secondary to his persistent bradycardia.  Will place in observation. Case discussed with Dr. Arce who is in agreement. Case discussed with Dr. Cerna who is in agreement with the plan of care.        Scribe Attestation:   Scribe #1: I performed the above scribed service and the documentation accurately describes the services I performed. I attest to the accuracy of the note.    Attending Attestation:     Physician Attestation Statement for NP/PA:   I discussed this assessment and plan of this patient with the NP/PA, but I did not personally examine the patient. The face to face encounter was performed by the NP/PA.    Other NP/PA Attestation Additions:    History of Present Illness: 77-year-old male presented with a chief complaint of fatigue and lightheadedness.    Medical Decision Making: Initial differential diagnosis included but not limited to cardiac arrhythmia, atypical MI, dehydration, and vertigo.  I am in agreement with the physician assistant's  assessment, treatment, and plan of care.         I, Reyna Prasad PA-C, personally performed the services described in this documentation.  All medical record entries made by the scribe were at my direction and in my presence.  I have reviewed the chart and agree that the record reflects my personal performance and is accurate and complete. Reyna Prasad PA-C.  1:24 PM 08/29/2018             Clinical Impression:   The primary encounter diagnosis was Bradycardia. Diagnoses of Fatigue and Pre-syncope were also pertinent to this visit.      Disposition:   Disposition: Discharged  Condition: Stable                        Reyna Prasad PA-C  08/29/18 1327       Bill Cerna MD  08/29/18 [id number]

## 2018-08-29 NOTE — PLAN OF CARE
Problem: Patient Care Overview  Goal: Plan of Care Review  Fall and safety precautions maintained.  Patient alert and oriented, resting in bed.  Denies pain or SOB. VSS, Pt in NAD.  Plan of care reviewed with patient. Verbalizes understanding.  Call light in reach, bed in low position and wheels locked. Will continue to monitor.  Family bedside.

## 2018-08-29 NOTE — H&P
"[facility] Hospital Medicine  History & Physical    Patient Name: Micheal Hunt  MRN: [id number]  Admission Date: 8/29/2018  Attending Physician: Bell Wilkinson NP  Primary Care Provider: Pk Lakhani MD         Patient information was obtained from patient and ER records.     Subjective:     Principal Problem:Symptomatic bradycardia    Chief Complaint:   Chief Complaint   Patient presents with    Fatigue     upper abdominal pain x 30 minutes.        HPI: Micheal Hunt is a 77 year old male with PMH Of CKD stage V, Hypertension, CAD, secondary HPT, and BPH who presents to the ED for evaluation of symptomatic bradycardia. He states he was sitting in Dr. Payne office and began to "feel bad. I felt like I was going to pass out. I layed down and the nurse checked by vital signs." He states his heart rate was 45, which normally runs around 65. He is known to Dr. Dunlap. He recently underwent cardiac stress test which is demonstrated fixed defect present in the inferoseptal location and EF 46% with moderate AS. Denies chest pain and SOB.    Past Medical History:   Diagnosis Date    Allergy     Anemia, mild 12/15/2014    Arthritis     Gout    Benign essential HTN 3/27/2012    BPH (benign prostatic hyperplasia)     CAD (coronary artery disease) 2006    Chronic kidney disease     due to ibuprofen    Colon polyp     Diverticulosis     Gastritis     GERD (gastroesophageal reflux disease)     History of colon polyps 5/3/2018    HTN (hypertension) 3/27/2012    Hyperlipidemia     LLL pneumonia 6/14/2018    LVH (left ventricular hypertrophy)     Mesenteric ischemia     Murmur, cardiac 3/27/2012    GRICELDA (obstructive sleep apnea)     DOES NOT USE A MACHINE    Sinus problem     Syncope and collapse        Past Surgical History:   Procedure Laterality Date    APPENDECTOMY      CARDIAC CATHETERIZATION      COLONOSCOPY  2011    CORONARY ARTERY BYPASS GRAFT  4/2007    x 1 "    JOINT REPLACEMENT      left knee total replacement  X 3    mid leftt finger      from a cactuss    MOLE REMOVAL  2016    rotative cuff      no rotative cuffs on bilat shoulders has pins     SHOULDER SURGERY      shoulder surgery bilat  RIGHT X 4; LEFT X 3       Review of patient's allergies indicates:   Allergen Reactions    Ace inhibitors Other (See Comments)     Cough    Arb-angiotensin receptor antagonist Itching    Eplerenone Other (See Comments)     Marked bradycardia, 40, tiredness and weakness      Sulfa (sulfonamide antibiotics) Itching     Patient says this was 10 years ago and doesn't remember what happened       No current facility-administered medications on file prior to encounter.      Current Outpatient Medications on File Prior to Encounter   Medication Sig    albuterol 90 mcg/actuation inhaler 2 puffs every 4 hours as needed for cough, wheeze, or shortness of breath    albuterol-ipratropium 2.5mg-0.5mg/3mL (DUO-NEB) 0.5 mg-3 mg(2.5 mg base)/3 mL nebulizer solution INHALE 1 VIAL BY NEBULIZER EVERY 6 HOURS AS NEEDED FOR WHEEZING    allopurinol (ZYLOPRIM) 100 MG tablet Take 1 tablet (100 mg total) by mouth once daily.    amLODIPine (NORVASC) 10 MG tablet Take 1 tablet (10 mg total) by mouth every evening.    aspirin (ECOTRIN) 81 MG EC tablet Take 81 mg by mouth once daily.      atorvastatin (LIPITOR) 80 MG tablet TAKE 1 TABLET (80 MG TOTAL) BY MOUTH ONCE DAILY.    calcitRIOL (ROCALTROL) 0.5 MCG Cap     carvedilol (COREG) 12.5 MG tablet     COLCRYS 0.6 mg tablet TAKE 1 TABLET (0.6 MG TOTAL) BY MOUTH ONCE DAILY.    cyanocobalamin, vitamin B-12, (VITAMIN B-12) 1,000 mcg Subl Take 1 tablet by mouth daily as needed. Takes 3,000mcg    doxycycline (VIBRAMYCIN) 100 MG Cap Take 1 capsule (100 mg total) by mouth every 12 (twelve) hours.    finasteride (PROSCAR) 5 mg tablet TAKE 1 TABLET ONCE DAILY.    fluticasone (FLONASE) 50 mcg/actuation nasal spray 2 sprays (100 mcg total) by Each  Nare route once daily. (Patient taking differently: 2 sprays by Each Nare route daily as needed. )    gabapentin (NEURONTIN) 100 MG capsule Take 1 capsule (100 mg total) by mouth every evening.    isosorbide mononitrate (ISMO,MONOKET) 10 mg tablet Take 1 tablet (10 mg total) by mouth every evening.    meclizine (ANTIVERT) 25 mg tablet     NITROSTAT 0.4 mg SL tablet PLACE 1 TABLET (0.4 MG TOTAL) UNDER THE TONGUE EVERY 5 (FIVE) MINUTES AS NEEDED FOR CHEST PAIN.    sodium chloride (SALINE NASAL MIST) 3 % Mist 1 spray by Nasal route 2 (two) times daily.    tamsulosin (FLOMAX) 0.4 mg Cp24 Take 1 capsule (0.4 mg total) by mouth once daily.    traMADol (ULTRAM) 50 mg tablet     zolpidem (AMBIEN) 5 MG Tab TAKE 1 TABLET (5 MG TOTAL) BY MOUTH NIGHTLY AS NEEDED.    [DISCONTINUED] carvedilol (COREG) 6.25 MG tablet TAKE 1 TABLET (6.25 MG TOTAL) BY MOUTH 2 (TWO) TIMES DAILY WITH MEALS.     Family History     Problem Relation (Age of Onset)    Cancer Father    Heart disease Mother, Sister    Hypertension Brother    Pneumonia Sister    Stroke Sister    Sudden death Father        Tobacco Use    Smoking status: Never Smoker    Smokeless tobacco: Never Used   Substance and Sexual Activity    Alcohol use: No    Drug use: No    Sexual activity: Not on file     Review of Systems   Constitutional: Positive for diaphoresis and fatigue. Negative for appetite change and chills.   HENT: Negative for congestion, hearing loss and sore throat.    Eyes: Negative for pain and itching.   Respiratory: Negative for cough, shortness of breath and wheezing.    Cardiovascular: Negative for chest pain, palpitations and leg swelling.   Gastrointestinal: Positive for nausea. Negative for abdominal pain and blood in stool.   Endocrine: Negative for polyphagia and polyuria.   Genitourinary: Negative for dysuria, flank pain and hematuria.   Musculoskeletal: Negative for arthralgias, back pain and myalgias.   Skin: Negative for pallor and rash.    Neurological: Positive for light-headedness. Negative for dizziness and syncope.   Hematological: Negative for adenopathy.   Psychiatric/Behavioral: Negative for agitation and confusion. The patient is not nervous/anxious.      Objective:     Vital Signs (Most Recent):  Temp: 96.7 °F (35.9 °C) (08/29/18 1321)  Pulse: (!) 50 (08/29/18 1321)  Resp: 16 (08/29/18 1020)  BP: (!) 126/57 (08/29/18 1321)  SpO2: 97 % (08/29/18 1321) Vital Signs (24h Range):  Temp:  [96.7 °F (35.9 °C)-98 °F (36.7 °C)] 96.7 °F (35.9 °C)  Pulse:  [46-54] 50  Resp:  [16-18] 16  SpO2:  [95 %-100 %] 97 %  BP: ()/(53-61) 126/57     Weight: 96.7 kg (213 lb 3 oz)  Body mass index is 28.13 kg/m².    Physical Exam   Constitutional: He is oriented to person, place, and time. He appears well-developed and well-nourished.   HENT:   Head: Normocephalic and atraumatic.   Right Ear: External ear normal.   Left Ear: External ear normal.   Nose: Nose normal.   Mouth/Throat: Oropharynx is clear and moist.   Eyes: Conjunctivae and EOM are normal. Pupils are equal, round, and reactive to light.   Neck: Normal range of motion. Neck supple.   Cardiovascular: Normal rate, regular rhythm and intact distal pulses.   Murmur (LSB) heard.  Pulmonary/Chest: Effort normal and breath sounds normal. No stridor.   Abdominal: Soft. Bowel sounds are normal. There is no tenderness.   Musculoskeletal: Normal range of motion.   Neurological: He is alert and oriented to person, place, and time. Coordination normal.   Skin: Skin is warm and dry.   Psychiatric: He has a normal mood and affect. His behavior is normal.   Nursing note and vitals reviewed.        CRANIAL NERVES     CN III, IV, VI   Pupils are equal, round, and reactive to light.  Extraocular motions are normal.        Significant Labs:   BMP:   Recent Labs   Lab  08/29/18   1040   GLU  119*   NA  135*   K  3.8   CL  100   CO2  24   BUN  82*   CREATININE  6.5*   CALCIUM  10.0     CBC:   Recent Labs   Lab   08/29/18   1040   WBC  6.60   HGB  10.1*   HCT  31.1*   PLT  160       Significant Imaging: I have reviewed and interpreted all pertinent imaging results/findings within the past 24 hours.     EKG. Sinus negrita rate 46    Assessment/Plan:     * Symptomatic bradycardia    Telemetry.  Hold beta blocker for now  Reviewed recent echo and cardiac stress test.  Consult cardiology.        Secondary hyperparathyroidism    Chronic. Resume home Calcitrol           Nonrheumatic aortic insufficiency with aortic stenosis, moderate    Stable. Recent echo 6/2018          Chronic kidney disease, stage 5    Stable. Currently at baseline renal function.   Known to Dr. Bishop            VTE Risk Mitigation (From admission, onward)    None             Bell Wilkinson, NP  Department of Hospital Medicine   [facility]

## 2018-08-29 NOTE — SUBJECTIVE & OBJECTIVE
Past Medical History:   Diagnosis Date    Allergy     Anemia, mild 12/15/2014    Arthritis     Gout    Benign essential HTN 3/27/2012    BPH (benign prostatic hyperplasia)     CAD (coronary artery disease) 2006    Chronic kidney disease     due to ibuprofen    Colon polyp     Diverticulosis     Gastritis     GERD (gastroesophageal reflux disease)     History of colon polyps 5/3/2018    HTN (hypertension) 3/27/2012    Hyperlipidemia     LLL pneumonia 6/14/2018    LVH (left ventricular hypertrophy)     Mesenteric ischemia     Murmur, cardiac 3/27/2012    GRICELDA (obstructive sleep apnea)     DOES NOT USE A MACHINE    Sinus problem     Syncope and collapse        Past Surgical History:   Procedure Laterality Date    APPENDECTOMY      CARDIAC CATHETERIZATION      COLONOSCOPY  2011    CORONARY ARTERY BYPASS GRAFT  4/2007    x 1    JOINT REPLACEMENT      left knee total replacement  X 3    mid leftt finger      from a cactuss    MOLE REMOVAL  2016    rotative cuff      no rotative cuffs on bilat shoulders has pins     SHOULDER SURGERY      shoulder surgery bilat  RIGHT X 4; LEFT X 3       Review of patient's allergies indicates:   Allergen Reactions    Ace inhibitors Other (See Comments)     Cough    Arb-angiotensin receptor antagonist Itching    Eplerenone Other (See Comments)     Marked bradycardia, 40, tiredness and weakness      Sulfa (sulfonamide antibiotics) Itching     Patient says this was 10 years ago and doesn't remember what happened       No current facility-administered medications on file prior to encounter.      Current Outpatient Medications on File Prior to Encounter   Medication Sig    albuterol 90 mcg/actuation inhaler 2 puffs every 4 hours as needed for cough, wheeze, or shortness of breath    albuterol-ipratropium 2.5mg-0.5mg/3mL (DUO-NEB) 0.5 mg-3 mg(2.5 mg base)/3 mL nebulizer solution INHALE 1 VIAL BY NEBULIZER EVERY 6 HOURS AS NEEDED FOR WHEEZING    allopurinol  (ZYLOPRIM) 100 MG tablet Take 1 tablet (100 mg total) by mouth once daily.    amLODIPine (NORVASC) 10 MG tablet Take 1 tablet (10 mg total) by mouth every evening.    aspirin (ECOTRIN) 81 MG EC tablet Take 81 mg by mouth once daily.      atorvastatin (LIPITOR) 80 MG tablet TAKE 1 TABLET (80 MG TOTAL) BY MOUTH ONCE DAILY.    calcitRIOL (ROCALTROL) 0.5 MCG Cap     carvedilol (COREG) 12.5 MG tablet     COLCRYS 0.6 mg tablet TAKE 1 TABLET (0.6 MG TOTAL) BY MOUTH ONCE DAILY.    cyanocobalamin, vitamin B-12, (VITAMIN B-12) 1,000 mcg Subl Take 1 tablet by mouth daily as needed. Takes 3,000mcg    doxycycline (VIBRAMYCIN) 100 MG Cap Take 1 capsule (100 mg total) by mouth every 12 (twelve) hours.    finasteride (PROSCAR) 5 mg tablet TAKE 1 TABLET ONCE DAILY.    fluticasone (FLONASE) 50 mcg/actuation nasal spray 2 sprays (100 mcg total) by Each Nare route once daily. (Patient taking differently: 2 sprays by Each Nare route daily as needed. )    gabapentin (NEURONTIN) 100 MG capsule Take 1 capsule (100 mg total) by mouth every evening.    isosorbide mononitrate (ISMO,MONOKET) 10 mg tablet Take 1 tablet (10 mg total) by mouth every evening.    meclizine (ANTIVERT) 25 mg tablet     NITROSTAT 0.4 mg SL tablet PLACE 1 TABLET (0.4 MG TOTAL) UNDER THE TONGUE EVERY 5 (FIVE) MINUTES AS NEEDED FOR CHEST PAIN.    sodium chloride (SALINE NASAL MIST) 3 % Mist 1 spray by Nasal route 2 (two) times daily.    tamsulosin (FLOMAX) 0.4 mg Cp24 Take 1 capsule (0.4 mg total) by mouth once daily.    traMADol (ULTRAM) 50 mg tablet     zolpidem (AMBIEN) 5 MG Tab TAKE 1 TABLET (5 MG TOTAL) BY MOUTH NIGHTLY AS NEEDED.    [DISCONTINUED] carvedilol (COREG) 6.25 MG tablet TAKE 1 TABLET (6.25 MG TOTAL) BY MOUTH 2 (TWO) TIMES DAILY WITH MEALS.     Family History     Problem Relation (Age of Onset)    Cancer Father    Heart disease Mother, Sister    Hypertension Brother    Pneumonia Sister    Stroke Sister    Sudden death Father         Tobacco Use    Smoking status: Never Smoker    Smokeless tobacco: Never Used   Substance and Sexual Activity    Alcohol use: No    Drug use: No    Sexual activity: Not on file     Review of Systems   Constitutional: Positive for diaphoresis and fatigue. Negative for appetite change and chills.   HENT: Negative for congestion, hearing loss and sore throat.    Eyes: Negative for pain and itching.   Respiratory: Negative for cough, shortness of breath and wheezing.    Cardiovascular: Negative for chest pain, palpitations and leg swelling.   Gastrointestinal: Positive for nausea. Negative for abdominal pain and blood in stool.   Endocrine: Negative for polyphagia and polyuria.   Genitourinary: Negative for dysuria, flank pain and hematuria.   Musculoskeletal: Negative for arthralgias, back pain and myalgias.   Skin: Negative for pallor and rash.   Neurological: Positive for light-headedness. Negative for dizziness and syncope.   Hematological: Negative for adenopathy.   Psychiatric/Behavioral: Negative for agitation and confusion. The patient is not nervous/anxious.      Objective:     Vital Signs (Most Recent):  Temp: 96.7 °F (35.9 °C) (08/29/18 1321)  Pulse: (!) 50 (08/29/18 1321)  Resp: 16 (08/29/18 1020)  BP: (!) 126/57 (08/29/18 1321)  SpO2: 97 % (08/29/18 1321) Vital Signs (24h Range):  Temp:  [96.7 °F (35.9 °C)-98 °F (36.7 °C)] 96.7 °F (35.9 °C)  Pulse:  [46-54] 50  Resp:  [16-18] 16  SpO2:  [95 %-100 %] 97 %  BP: ()/(53-61) 126/57     Weight: 96.7 kg (213 lb 3 oz)  Body mass index is 28.13 kg/m².    Physical Exam   Constitutional: He is oriented to person, place, and time. He appears well-developed and well-nourished.   HENT:   Head: Normocephalic and atraumatic.   Right Ear: External ear normal.   Left Ear: External ear normal.   Nose: Nose normal.   Mouth/Throat: Oropharynx is clear and moist.   Eyes: Conjunctivae and EOM are normal. Pupils are equal, round, and reactive to light.   Neck: Normal  range of motion. Neck supple.   Cardiovascular: Normal rate, regular rhythm and intact distal pulses.   Murmur (LSB) heard.  Pulmonary/Chest: Effort normal and breath sounds normal. No stridor.   Abdominal: Soft. Bowel sounds are normal. There is no tenderness.   Musculoskeletal: Normal range of motion.   Neurological: He is alert and oriented to person, place, and time. Coordination normal.   Skin: Skin is warm and dry.   Psychiatric: He has a normal mood and affect. His behavior is normal.   Nursing note and vitals reviewed.        CRANIAL NERVES     CN III, IV, VI   Pupils are equal, round, and reactive to light.  Extraocular motions are normal.        Significant Labs:   BMP:   Recent Labs   Lab  08/29/18   1040   GLU  119*   NA  135*   K  3.8   CL  100   CO2  24   BUN  82*   CREATININE  6.5*   CALCIUM  10.0     CBC:   Recent Labs   Lab  08/29/18   1040   WBC  6.60   HGB  10.1*   HCT  31.1*   PLT  160       Significant Imaging: I have reviewed and interpreted all pertinent imaging results/findings within the past 24 hours.     EKG. Sinus negrita rate 46

## 2018-08-29 NOTE — HPI
"Micheal Hunt is a 77 year old male with PMH Of CKD stage V, Hypertension, CAD, secondary HPT, and BPH who presents to the ED for evaluation of symptomatic bradycardia. He states he was sitting in Dr. Payne office and began to "feel bad. I felt like I was going to pass out. I layed down and the nurse checked by vital signs." He states his heart rate was 45, which normally runs around 65. He is known to Dr. Dunlap. He recently underwent cardiac stress test which is demonstrated fixed defect present in the inferoseptal location and EF 46% with moderate AS. Denies chest pain and SOB.  "

## 2018-08-29 NOTE — ED NOTES
Pt presents to ED with c/o acute onset of dizziness and nausea. Pt was at MD office when sx occurred. Sent to ED to eval bradycardia. Pt states his sx have already improved. Pt ambulated to ED bed without any difficulty. Placed on BP, pulse ox, cardiac monitor. Bradycardia noted. NANCY Orellana at bedside for eval. NAD noted. VSS. Will monitor prn.

## 2018-08-29 NOTE — UM SECONDARY REVIEW
Physician Advisor External    Level of Care Issue    Approved inpt per ehr md reviewer, dr ramirez..

## 2018-08-30 VITALS
HEIGHT: 73 IN | HEART RATE: 60 BPM | TEMPERATURE: 97 F | RESPIRATION RATE: 20 BRPM | WEIGHT: 209.88 LBS | DIASTOLIC BLOOD PRESSURE: 74 MMHG | BODY MASS INDEX: 27.82 KG/M2 | OXYGEN SATURATION: 99 % | SYSTOLIC BLOOD PRESSURE: 148 MMHG

## 2018-08-30 LAB
ANION GAP SERPL CALC-SCNC: 11 MMOL/L
BASOPHILS # BLD AUTO: 0 K/UL
BASOPHILS NFR BLD: 0.5 %
BUN SERPL-MCNC: 85 MG/DL
CALCIUM SERPL-MCNC: 9.6 MG/DL
CHLORIDE SERPL-SCNC: 103 MMOL/L
CO2 SERPL-SCNC: 22 MMOL/L
CREAT SERPL-MCNC: 6.4 MG/DL
DIFFERENTIAL METHOD: ABNORMAL
EOSINOPHIL # BLD AUTO: 0.4 K/UL
EOSINOPHIL NFR BLD: 6.2 %
ERYTHROCYTE [DISTWIDTH] IN BLOOD BY AUTOMATED COUNT: 13.6 %
EST. GFR  (AFRICAN AMERICAN): 9 ML/MIN/1.73 M^2
EST. GFR  (NON AFRICAN AMERICAN): 8 ML/MIN/1.73 M^2
GLUCOSE SERPL-MCNC: 93 MG/DL
HCT VFR BLD AUTO: 28.9 %
HGB BLD-MCNC: 9.5 G/DL
LYMPHOCYTES # BLD AUTO: 2.4 K/UL
LYMPHOCYTES NFR BLD: 35 %
MCH RBC QN AUTO: 30 PG
MCHC RBC AUTO-ENTMCNC: 33 G/DL
MCV RBC AUTO: 91 FL
MONOCYTES # BLD AUTO: 0.4 K/UL
MONOCYTES NFR BLD: 5.3 %
NEUTROPHILS # BLD AUTO: 3.6 K/UL
NEUTROPHILS NFR BLD: 53 %
PLATELET # BLD AUTO: 140 K/UL
PMV BLD AUTO: 8.6 FL
POTASSIUM SERPL-SCNC: 3.6 MMOL/L
RBC # BLD AUTO: 3.18 M/UL
SODIUM SERPL-SCNC: 136 MMOL/L
WBC # BLD AUTO: 6.8 K/UL

## 2018-08-30 PROCEDURE — 85025 COMPLETE CBC W/AUTO DIFF WBC: CPT

## 2018-08-30 PROCEDURE — 25000242 PHARM REV CODE 250 ALT 637 W/ HCPCS: Performed by: NURSE PRACTITIONER

## 2018-08-30 PROCEDURE — 80048 BASIC METABOLIC PNL TOTAL CA: CPT

## 2018-08-30 PROCEDURE — 99223 1ST HOSP IP/OBS HIGH 75: CPT | Mod: ,,, | Performed by: INTERNAL MEDICINE

## 2018-08-30 PROCEDURE — 25000003 PHARM REV CODE 250: Performed by: NURSE PRACTITIONER

## 2018-08-30 PROCEDURE — 36415 COLL VENOUS BLD VENIPUNCTURE: CPT

## 2018-08-30 PROCEDURE — 99900035 HC TECH TIME PER 15 MIN (STAT)

## 2018-08-30 PROCEDURE — 94761 N-INVAS EAR/PLS OXIMETRY MLT: CPT

## 2018-08-30 RX ADMIN — CALCITRIOL 0.5 MCG: 0.25 CAPSULE, LIQUID FILLED ORAL at 08:08

## 2018-08-30 RX ADMIN — ASPIRIN 81 MG: 81 TABLET, COATED ORAL at 08:08

## 2018-08-30 RX ADMIN — ATORVASTATIN CALCIUM 80 MG: 40 TABLET, FILM COATED ORAL at 08:08

## 2018-08-30 RX ADMIN — FLUTICASONE PROPIONATE 100 MCG: 50 SPRAY, METERED NASAL at 08:08

## 2018-08-30 RX ADMIN — FINASTERIDE 5 MG: 5 TABLET, FILM COATED ORAL at 08:08

## 2018-08-30 NOTE — CONSULTS
"[facility]  Cardiology  Consult Note    Patient Name: Micheal Hunt  MRN: [id number]  Admission Date: 8/29/2018  Hospital Length of Stay: 1 days  Code Status: Full Code   Attending Provider: Dr. Arce  Consulting Provider: Getachew Dunlap MD  Primary Care Physician: Pk Lakhani MD  Principal Problem:Symptomatic bradycardia    Patient information was obtained from patient, past medical records and ER records.   Seen on 8/10/2018   PCP: Dr. Lakhani  Renal: Dr. Rojo, see every 6 weeks.  ENT: Dr. Nails, now Dr. Chahal in [place]  GI: Dr. Harris, Dr. Saleem  Physical medicine: Dr. Whitehead  Urology: Dr. Herring  Orthopedic: Dr. Fox in [place], [place], [phone], Dr. Álvarez  Lives alone, daughter, Tonya, on the same ranch, 20 acre, 4 horses, 20 chicken, no  workers, prior commercial contractor, grandson, Uziel  Daughter, Crow, nutritionist in [facility] supportive of Mediterranean diet, now on plant-based diet, and a Yoga instruction.        Consults  Subjective:     Chief Complaint:  Sudden "hot" feeling radiating to the head, felt flushed and sweating, disorientation, help by cold towel and laying down.    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 Significant cardiac history with previous CABG in [place].    Active problems of   1. Coronary artery disease involving native coronary artery of native heart without angina pectoris    2. Pure hypercholesterolemia    3. Hypertensive left ventricular hypertrophy, without heart failure    4. Sigmoid diverticulitis, 3 episodes, 2005, 2015, 2018    5. Chronic kidney disease, stage 5    6. Essential hypertension    7. Nonrheumatic aortic insufficiency with aortic stenosis    8. LV dysfunction, EF 50%, reduced to 34%, now 46%      Admitted with above symptom and found to be markedly bradycardic while in Dr. Payne's office. Denies any nausea but reported pre-syncope. ECG send to me showed rate in " mid 40s. Was on low dose Coreg. BB held since admit and now feels back to normal. Lab shows eGFR of 8-9 cc!, marked anemia with mild elevation of troponin which is similar to level on 6/14/2018. No acute ECG changed, with high voltage and STT abnormalities. Telemetry shows rates in the mid-50s.     CXR - cardiomediastinal silhouette is stable.  The postoperative changes suggesting CABG in the heart appears mildly enlarged as before.  Thoracic aorta is tortuous.  Lungs are well expanded and clear of infiltrate.  There is no pleural effusion.     Cardiac workup in 6/2018      Echo - Mild-to-moderate regurgitation is present in the aortic valve..  The left ventricle cavity is mildly dilated.  Left atrium is moderately dilated.  Tricuspid valve shows mild regurgitation.  Left ventricle ejection fraction is mildly decreased at 46%  Grade I (mild) left ventricular diastolic dysfunction consistent with impaired relaxation.  LA pressure is normal.  There is moderate aortic valve stenosis.  RA cavity is moderately dilated.  RV systolic function is mildly reduced.  Normal central venous pressure (3 mm Hg).             Lexiscan - Arrhythmias during stress: rare PACs, rare PVCs.  There is a left ventricular function defect present in the inferoseptal location(s).  There is a left ventricular perfusion defect that is small to moderate in size with moderate reduction in uptake present in the apical to basal anterior location(s) that is predominantly fixed.  There is a left ventricular perfusion defect that is small in size with moderate reduction in uptake present in the inferior location(s) that is predominantly fixed.  There is a left ventricular function defect present in the inferoseptal location(s).  There is evidence of transient ischemic dilation (TID). TID was appreciated visually and quantitatively.  No symptoms reported      Past Medical History:   Diagnosis Date    Allergy     Anemia, mild 12/15/2014    Arthritis      Gout    Benign essential HTN 3/27/2012    BPH (benign prostatic hyperplasia)     CAD (coronary artery disease) 2006    Chronic kidney disease     due to ibuprofen    Colon polyp     Diverticulosis     Gastritis     GERD (gastroesophageal reflux disease)     History of colon polyps 5/3/2018    HTN (hypertension) 3/27/2012    Hyperlipidemia     LLL pneumonia 6/14/2018    LVH (left ventricular hypertrophy)     Mesenteric ischemia     Murmur, cardiac 3/27/2012    GRICELDA (obstructive sleep apnea)     DOES NOT USE A MACHINE    Sinus problem     Syncope and collapse        Past Surgical History:   Procedure Laterality Date    APPENDECTOMY      CARDIAC CATHETERIZATION      COLONOSCOPY  2011    CORONARY ARTERY BYPASS GRAFT  4/2007    x 1    JOINT REPLACEMENT      left knee total replacement  X 3    mid leftt finger      from a cactuss    MOLE REMOVAL  2016    rotative cuff      no rotative cuffs on bilat shoulders has pins     SHOULDER SURGERY      shoulder surgery bilat  RIGHT X 4; LEFT X 3       Review of patient's allergies indicates:   Allergen Reactions    Ace inhibitors Other (See Comments)     Cough    Arb-angiotensin receptor antagonist Itching    Eplerenone Other (See Comments)     Marked bradycardia, 40, tiredness and weakness      Sulfa (sulfonamide antibiotics) Itching     Patient says this was 10 years ago and doesn't remember what happened       No current facility-administered medications on file prior to encounter.      Current Outpatient Medications on File Prior to Encounter   Medication Sig    allopurinol (ZYLOPRIM) 100 MG tablet Take 1 tablet (100 mg total) by mouth once daily.    amLODIPine (NORVASC) 10 MG tablet Take 1 tablet (10 mg total) by mouth every evening.    aspirin (ECOTRIN) 81 MG EC tablet Take 81 mg by mouth once daily.      atorvastatin (LIPITOR) 80 MG tablet TAKE 1 TABLET (80 MG TOTAL) BY MOUTH ONCE DAILY.    COLCRYS 0.6 mg tablet TAKE 1 TABLET (0.6 MG  TOTAL) BY MOUTH ONCE DAILY.    cyanocobalamin, vitamin B-12, (VITAMIN B-12) 1,000 mcg Subl Take 1 tablet by mouth daily as needed. Takes 3,000mcg    doxycycline (VIBRAMYCIN) 100 MG Cap Take 1 capsule (100 mg total) by mouth every 12 (twelve) hours.    finasteride (PROSCAR) 5 mg tablet TAKE 1 TABLET ONCE DAILY.    gabapentin (NEURONTIN) 100 MG capsule Take 1 capsule (100 mg total) by mouth every evening.    isosorbide mononitrate (ISMO,MONOKET) 10 mg tablet Take 1 tablet (10 mg total) by mouth every evening.    meclizine (ANTIVERT) 25 mg tablet daily as needed.     sodium chloride (SALINE NASAL MIST) 3 % Mist 1 spray by Nasal route 2 (two) times daily.    traMADol (ULTRAM) 50 mg tablet     zolpidem (AMBIEN) 5 MG Tab TAKE 1 TABLET (5 MG TOTAL) BY MOUTH NIGHTLY AS NEEDED.    [DISCONTINUED] carvedilol (COREG) 12.5 MG tablet Take 6.25 mg by mouth once daily.     albuterol 90 mcg/actuation inhaler 2 puffs every 4 hours as needed for cough, wheeze, or shortness of breath    albuterol-ipratropium 2.5mg-0.5mg/3mL (DUO-NEB) 0.5 mg-3 mg(2.5 mg base)/3 mL nebulizer solution INHALE 1 VIAL BY NEBULIZER EVERY 6 HOURS AS NEEDED FOR WHEEZING    fluticasone (FLONASE) 50 mcg/actuation nasal spray 2 sprays (100 mcg total) by Each Nare route once daily. (Patient taking differently: 2 sprays by Each Nare route daily as needed. )    NITROSTAT 0.4 mg SL tablet PLACE 1 TABLET (0.4 MG TOTAL) UNDER THE TONGUE EVERY 5 (FIVE) MINUTES AS NEEDED FOR CHEST PAIN.     Family History     Problem Relation (Age of Onset)    Cancer Father    Heart disease Mother, Sister    Hypertension Brother    Pneumonia Sister    Stroke Sister    Sudden death Father        Tobacco Use    Smoking status: Never Smoker    Smokeless tobacco: Never Used   Substance and Sexual Activity    Alcohol use: No    Drug use: No    Sexual activity: Not on file     ROS   Constitutional: no further chills, fevers, and sweats and no real fatigue, less head  congestion uses mask outdoor, weight loss 7 lbs since 5/2018  Eyes: negative for icterus, redness and visual disturbance  Respiratory: negative for asthma, minimal dry cough with seasonal sinusitis, no dyspnea on exertion, no hemoptysis, pleurisy/chest pain and wheezing, [place] score 0 now 7  Cardiovascular: no further chest pain, chest pressure/discomfort, dyspnea, near-syncope, no further nightly palpitations with less occasional /90, no paroxysmal nocturnal dyspnea and syncope  Gastrointestinal: negative for abdominal pain, have occasional nausea and no vomiting, no further heart burn on exertion, BM more frequent.  Musculoskeletal:  no have muscle cramp in the left leg post op receiving injection from Dr. Whitehead  Neurological: negative for coordination problems, some dizziness after medications, takes in interval, no gait problems, less headaches, likely sinus, no paresthesia, tremors and vertigo, sleeping 8 hours nightly.  Psych: no depression nor anxious, close sister (71 year old) passed in [place] 1/2016.      Objective:     Vital Signs (Most Recent):  Temp: 96.7 °F (35.9 °C) (08/30/18 0722)  Pulse: (!) 56 (08/30/18 0722)  Resp: 18 (08/30/18 0722)  BP: (!) 145/72 (08/30/18 0722)  SpO2: 97 % (08/30/18 0802) Vital Signs (24h Range):  Temp:  [96.7 °F (35.9 °C)-97.7 °F (36.5 °C)] 96.7 °F (35.9 °C)  Pulse:  [46-60] 56  Resp:  [17-18] 18  SpO2:  [92 %-98 %] 97 %  BP: ()/(56-73) 145/72     Weight: 95.2 kg (209 lb 14.1 oz)  Body mass index is 27.69 kg/m².    SpO2: 97 %  O2 Device (Oxygen Therapy): room air      Intake/Output Summary (Last 24 hours) at 8/30/2018 1040  Last data filed at 8/30/2018 0430  Gross per 24 hour   Intake 480 ml   Output 700 ml   Net -220 ml       Lines/Drains/Airways     Drain                 Hemodialysis AV Fistula Left upper arm -- days          Peripheral Intravenous Line                 Peripheral IV - Double Lumen 08/29/18 1044 Right Antecubital less than 1 day           "      Physical Exam  Constitutional: He appears healthy. No distress.   HENT:   Mouth/Throat: Dentition is normal.   Eyes: Conjunctivae are normal.   Neck: Thyroid normal. Neck supple. Circumference 15.5"  Cardiovascular: Normal rate, regular rhythm and normal pulses. PMI is not displaced. Exam reveals no gallop.   Medium-pitched machinery crescendo-decrescendo early systolic murmur is present with a grade of 3/6 at the upper right sternal border, upper left sternal border and apex   Pulses:   Carotid pulses are 2+ on the right side, and 2+ on the left side.   Dorsalis pedis pulses are 2+ on the right side, and 2+ on the left side.   Pulmonary/Chest: Breath sounds normal. He exhibits no tenderness.   Abdominal: Soft, very active bowel sounds. Waist 46" down to 42.5"  Extremities: no pitting edema around the sock line.   Neurological: He is alert and oriented to person, place, and time. Gait normal.   Skin: Skin is warm and dry. No cyanosis. No pallor. Nails show no clubbing.       Significant Labs:   ABG: No results for input(s): PH, PCO2, HCO3, POCSATURATED, BE in the last 48 hours., CMP   Recent Labs   Lab  08/29/18   1040  08/30/18   0431   NA  135*  136   K  3.8  3.6   CL  100  103   CO2  24  22*   GLU  119*  93   BUN  82*  85*   CREATININE  6.5*  6.4*   CALCIUM  10.0  9.6   PROT  6.8   --    ALBUMIN  3.8   --    BILITOT  0.4   --    ALKPHOS  63   --    AST  17   --    ALT  21   --    ANIONGAP  11  11   ESTGFRAFRICA  9*  9*   EGFRNONAA  7*  8*   , CBC   Recent Labs   Lab  08/29/18   1040  08/30/18   0431   WBC  6.60  6.80   HGB  10.1*  9.5*   HCT  31.1*  28.9*   PLT  160  140*   , INR No results for input(s): INR, PROTIME in the last 48 hours., Lipid Panel No results for input(s): CHOL, HDL, LDLCALC, TRIG, CHOLHDL in the last 48 hours., Troponin   Recent Labs   Lab  08/29/18   1040   TROPONINI  0.041*    and All pertinent lab results from the last 24 hours have been reviewed.    Significant Imaging: X-Ray: CXR: " X-Ray Chest 1 View (CXR): No results found for this visit on 08/29/18.  Assessment and Plan:     Active Diagnoses:    Diagnosis Date Noted POA    PRINCIPAL PROBLEM:  Symptomatic bradycardia [R00.1] 08/29/2018 Yes    Secondary hyperparathyroidism [N25.81] 08/29/2018 Yes    Nonrheumatic aortic insufficiency with aortic stenosis, moderate [I35.2] 08/10/2018 Yes    Chronic kidney disease, stage 5 [N18.5] 06/14/2018 Yes    Anemia of chronic disease [D63.8] 12/15/2014 Yes      Problems Resolved During this Admission:       VTE Risk Mitigation (From admission, onward)        Ordered     heparin (porcine) injection 5,000 Units  Every 8 hours      08/29/18 1436     IP VTE HIGH RISK PATIENT  Once      08/29/18 1436        Bradycardia likely due to vaso effect from nausea / discomfort, on low dose Coreg  CKD stage 5, suspect as cause of occasional nausea    All medical issues reviewed, continue current Rx except  Discussed with Ms. Jaja NP, will hold BB for now. Continue close follow up with renal, will likely need dialysis soon, need to consider shunt placement.  Will follow up as previously planned.    Thank you for your consult. I will sign off. Please contact us if you have any additional questions.    Getachew Dunlap MD  Cardiology   [facility]    Patient Active Problem List   Diagnosis    Osteoarthritis of right knee, L-TKR 10/2012    Nocturia    Hematuria    Carotid stenosis    Essential hypertension    Hyperlipidemia, baseline     CAD (coronary artery disease), 2V CABG 2007    Gout, arthritis    Dizziness    LVH (left ventricular hypertrophy) due to hypertensive disease    Abnormal cardiovascular stress test    GERD (gastroesophageal reflux disease)    Elevated PSA    Acquired hammer toe    Diastolic dysfunction    Abdominal obesity    Back pain, chronic    Glucose intolerance (impaired glucose tolerance)    Anemia of chronic disease    Gastric nodule     Diverticulitis, 2005, 2015    Personal history of colonic polyps    PSA elevation    DDD (degenerative disc disease), lumbar    LV dysfunction, EF 50%, reduced to 34%, now 46%    Other spondylosis, lumbar region    Knee pain    NICK (acute kidney injury)    BPH with obstruction/lower urinary tract symptoms    Itching, both legs, onset 7/2017    Chronic sinusitis    Mild persistent asthma without complication    Sigmoid diverticulitis, 3 episodes, 2005, 2015, 2018    Anemia due to stage 4 chronic kidney disease    Thrombocytopenia    Renal cyst    Unilateral inguinal hernia    BMI 29.0-29.9,adult    Chronic kidney disease, stage 5    Generalized abdominal pain    Chronic right-sided heart failure    Nonrheumatic aortic insufficiency with aortic stenosis, moderate    Symptomatic bradycardia    Secondary hyperparathyroidism     Total face-to-face time with the patient was 40 minutes and greater than 50% was spent in counseling and coordination of care. The above assessment and plan have been discussed at length. Referring physician's note reviewed. Labs and procedure over the last 6 months reviewed. Problem List updated. Asked to bring in all active medications / pills bottles with next visit.

## 2018-08-30 NOTE — PLAN OF CARE
Problem: Patient Care Overview  Goal: Plan of Care Review  Patient alert and oriented.  Up ad shashi to bathroom.  Monitoring heart rate closely.  Full code.  Cardiac diet.  Educated patient on the need for heparin injections, provided patient with medication print-out.  Call light in reach.  Bed in low/locked position.

## 2018-08-30 NOTE — PLAN OF CARE
Problem: Patient Care Overview  Goal: Plan of Care Review  Outcome: Outcome(s) achieved Date Met: 08/30/18  Pt medication and discharge instructions given and reviewed, understanding verbalized.  IV and tele removed.  VSS, Pt in NAD, denies pain and discomfort at this time.  Discharged home with family.  Left unit via wheelchair.

## 2018-08-30 NOTE — PLAN OF CARE
PCP is Dr Lakhani.  Pharmacy is [facility] in [place].  Patient lives in a home on [place], and his daughter Tonya lives in a home on the same property.  Denies HH.  Patient goes for outpatient PT 2x/week at [facility]; drives self.  Discharge plan is home; no needs.       08/30/18 1213   Discharge Assessment   Assessment Type Discharge Planning Assessment   Confirmed/corrected address and phone number on facesheet? Yes   Assessment information obtained from? Patient   Prior to hospitilization cognitive status: Alert/Oriented   Prior to hospitalization functional status: Independent;Assistive Equipment   Current cognitive status: Alert/Oriented   Current Functional Status: Independent;Assistive Equipment   Lives With alone   Able to Return to Prior Arrangements yes   Is patient able to care for self after discharge? Yes   Patient's perception of discharge disposition home or selfcare   Readmission Within The Last 30 Days no previous admission in last 30 days   Patient currently being followed by outpatient case management? No   Patient currently receives any other outside agency services? Yes   Name and contact number of agency or person providing outside services outpatient PT 2x/week   Equipment Currently Used at Home nebulizer;cane, straight   Do you have any problems affording any of your prescribed medications? No   Is the patient taking medications as prescribed? yes   Does the patient have transportation home? Yes   Transportation Available family or friend will provide   Dialysis Name and Scheduled days none   Does the patient receive services at the [facility]? No   Discharge Plan A Home   Patient/Family In Agreement With Plan yes

## 2018-08-30 NOTE — PLAN OF CARE
08/30/18 0802   Patient Assessment/Suction   Level of Consciousness (AVPU) alert   Respiratory Effort Unlabored;Normal   All Lung Fields Breath Sounds clear;diminished   PRE-TX-O2-ETCO2   O2 Device (Oxygen Therapy) room air   SpO2 97 %   Pulse Oximetry Type Intermittent   $ Pulse Oximetry - Multiple Charge Pulse Oximetry - Multiple   Aerosol Therapy   $ Aerosol Therapy Charges PRN treatment not required

## 2018-08-30 NOTE — PLAN OF CARE
08/30/18 1343   Final Note   Assessment Type Final Discharge Note   Discharge Disposition Home

## 2018-08-30 NOTE — PROGRESS NOTES
08/29/18 1946   Patient Assessment/Suction   Level of Consciousness (AVPU) alert   PRE-TX-O2-ETCO2   O2 Device (Oxygen Therapy) room air   SpO2 96 %   Pulse Oximetry Type Intermittent   $ Pulse Oximetry - Single Charge Pulse Oximetry - Single   Pulse 60   Aerosol Therapy   $ Aerosol Therapy Charges PRN treatment not required   Respiratory Treatment Status PRN treatment not required

## 2018-08-31 ENCOUNTER — EXTERNAL CHRONIC CARE MANAGEMENT (OUTPATIENT)
Dept: PRIMARY CARE CLINIC | Facility: CLINIC | Age: 78
End: 2018-08-31
Payer: MEDICARE

## 2018-08-31 PROCEDURE — 99490 CHRNC CARE MGMT STAFF 1ST 20: CPT | Mod: S$PBB,,, | Performed by: FAMILY MEDICINE

## 2018-08-31 PROCEDURE — 99490 CHRNC CARE MGMT STAFF 1ST 20: CPT | Mod: PBBFAC,PO | Performed by: FAMILY MEDICINE

## 2018-08-31 NOTE — HOSPITAL COURSE
Patient monitored closely during observation stay with telemetry. Cardiology consulted. Home beta blocker held. He was noted to have improvement of bradycardia with HR 58-60 and asymptomatic. Discussed with Dr. Dunlap. Recommends hold beta blocker for now. He is feeling better and denies dizziness. CT head negative for acute process.     Discussed with patient CKD stage V. He has AVF to LUE in place with + TB.  Agrees to HD when needed. He will follow up with Dr. Bishop outpatient.

## 2018-08-31 NOTE — DISCHARGE SUMMARY
"[facility] Hospital Medicine  Discharge Summary      Patient Name: Micheal Hunt  MRN: [id number]  Admission Date: 8/29/2018  Hospital Length of Stay: 1 days  Discharge Date and Time: 8/30/2018  1:50 PM  Attending Physician: No att. providers found   Discharging Provider: Stefanie Wilkinson NP  Primary Care Provider: Pk Lakhani MD      HPI:   Micheal Hunt is a 77 year old male with PMH Of CKD stage V, Hypertension, CAD, secondary HPT, and BPH who presents to the ED for evaluation of symptomatic bradycardia. He states he was sitting in Dr. Payne office and began to "feel bad. I felt like I was going to pass out. I layed down and the nurse checked by vital signs." He states his heart rate was 45, which normally runs around 65. He is known to Dr. Dunlap. He recently underwent cardiac stress test which is demonstrated fixed defect present in the inferoseptal location and EF 46% with moderate AS. Denies chest pain and SOB.    * No surgery found *      Hospital Course:   Patient monitored closely during observation stay with telemetry. Cardiology consulted. Home beta blocker held. He was noted to have improvement of bradycardia with HR 58-60 and asymptomatic. Discussed with Dr. Dunlap. Recommends hold beta blocker for now. He is feeling better and denies dizziness. CT head negative for acute process.     Discussed with patient CKD stage V. He has AVF to LUE in place with + TB.  Agrees to HD when needed. He will follow up with Dr. Bishop outpatient.      Consults:   Consults (From admission, onward)        Status Ordering Provider     Inpatient consult to Cardiology  Once     Provider:  Getachew Dunlap MD    Completed STEFANIE WILKINSON new Assessment & Plan notes have been filed under this hospital service since the last note was generated.  Service: Hospital Medicine    Final Active Diagnoses:    Diagnosis Date Noted POA    PRINCIPAL PROBLEM:  Symptomatic bradycardia " [R00.1] 08/29/2018 Yes    Secondary hyperparathyroidism [N25.81] 08/29/2018 Yes    Nonrheumatic aortic insufficiency with aortic stenosis, moderate [I35.2] 08/10/2018 Yes    Chronic kidney disease, stage 5 [N18.5] 06/14/2018 Yes    Anemia of chronic disease [D63.8] 12/15/2014 Yes      Problems Resolved During this Admission:       Discharged Condition: stable    Disposition: Home or Self Care    Follow Up:  Follow-up Information     Pk Lakhani MD In 1 week.    Specialty:  Family Medicine  Why:  hospital follow up  Contact information:  [address] [facility]  [place] [address]  [phone]             Getachew Dunlap MD In 2 weeks.    Specialty:  Cardiology  Contact information:  [address] [facility]  [address]  [place] [address]  [phone]                 Patient Instructions:      Diet renal     Diet Cardiac     Notify your health care provider if you experience any of the following:  severe persistent headache     Notify your health care provider if you experience any of the following:  persistent dizziness, light-headedness, or visual disturbances     Notify your health care provider if you experience any of the following:  severe uncontrolled pain     Notify your health care provider if you experience any of the following:  persistent nausea and vomiting or diarrhea     Notify your health care provider if you experience any of the following:  increased confusion or weakness     Activity as tolerated       Significant Diagnostic Studies: Labs:   BMP:   Recent Labs   Lab  08/30/18   0431   GLU  93   NA  136   K  3.6   CL  103   CO2  22*   BUN  85*   CREATININE  6.4*   CALCIUM  9.6    and CMP   Recent Labs   Lab  08/30/18   0431   NA  136   K  3.6   CL  103   CO2  22*   GLU  93   BUN  85*   CREATININE  6.4*   CALCIUM  9.6   ANIONGAP  11   ESTGFRAFRICA  9*   EGFRNONAA  8*       Pending Diagnostic Studies:     None         Medications:  Reconciled Home Medications:      Medication List      CHANGE how you take these  medications    fluticasone 50 mcg/actuation nasal spray  Commonly known as:  FLONASE  2 sprays (100 mcg total) by Each Nare route once daily.  What changed:    · when to take this  · reasons to take this        CONTINUE taking these medications    albuterol 90 mcg/actuation inhaler  2 puffs every 4 hours as needed for cough, wheeze, or shortness of breath     albuterol-ipratropium 2.5 mg-0.5 mg/3 mL nebulizer solution  Commonly known as:  DUO-NEB  INHALE 1 VIAL BY NEBULIZER EVERY 6 HOURS AS NEEDED FOR WHEEZING     allopurinol 100 MG tablet  Commonly known as:  ZYLOPRIM  Take 1 tablet (100 mg total) by mouth once daily.     amLODIPine 10 MG tablet  Commonly known as:  NORVASC  Take 1 tablet (10 mg total) by mouth every evening.     aspirin 81 MG EC tablet  Commonly known as:  ECOTRIN  Take 81 mg by mouth once daily.     atorvastatin 80 MG tablet  Commonly known as:  LIPITOR  TAKE 1 TABLET (80 MG TOTAL) BY MOUTH ONCE DAILY.     COLCRYS 0.6 mg tablet  Generic drug:  colchicine  TAKE 1 TABLET (0.6 MG TOTAL) BY MOUTH ONCE DAILY.     doxycycline 100 MG Cap  Commonly known as:  VIBRAMYCIN  Take 1 capsule (100 mg total) by mouth every 12 (twelve) hours.     finasteride 5 mg tablet  Commonly known as:  PROSCAR  TAKE 1 TABLET ONCE DAILY.     gabapentin 100 MG capsule  Commonly known as:  NEURONTIN  Take 1 capsule (100 mg total) by mouth every evening.     isosorbide mononitrate 10 mg tablet  Commonly known as:  ISMO,MONOKET  Take 1 tablet (10 mg total) by mouth every evening.     meclizine 25 mg tablet  Commonly known as:  ANTIVERT  daily as needed.     NITROSTAT 0.4 MG SL tablet  Generic drug:  nitroGLYCERIN  PLACE 1 TABLET (0.4 MG TOTAL) UNDER THE TONGUE EVERY 5 (FIVE) MINUTES AS NEEDED FOR CHEST PAIN.     sodium chloride 3 % Mist  Commonly known as:  SALINE NASAL MIST  1 spray by Nasal route 2 (two) times daily.     traMADol 50 mg tablet  Commonly known as:  ULTRAM     VITAMIN B-12 1,000 mcg Subl  Generic drug:   cyanocobalamin (vitamin B-12)  Take 1 tablet by mouth daily as needed. Takes 3,000mcg     zolpidem 5 MG Tab  Commonly known as:  AMBIEN  TAKE 1 TABLET (5 MG TOTAL) BY MOUTH NIGHTLY AS NEEDED.        STOP taking these medications    carvedilol 12.5 MG tablet  Commonly known as:  COREG            Indwelling Lines/Drains at time of discharge:   Lines/Drains/Airways     Drain                 Hemodialysis AV Fistula Left upper arm -- days                Time spent on the discharge of patient: 30 minutes  Patient was seen and examined on the date of discharge and determined to be suitable for discharge.         Bell Wilkinson NP  Department of Hospital Medicine  [facility]

## 2018-09-04 ENCOUNTER — LAB VISIT (OUTPATIENT)
Dept: LAB | Facility: HOSPITAL | Age: 78
End: 2018-09-04
Attending: INTERNAL MEDICINE
Payer: MEDICARE

## 2018-09-04 ENCOUNTER — OFFICE VISIT (OUTPATIENT)
Dept: FAMILY MEDICINE | Facility: CLINIC | Age: 78
End: 2018-09-04
Payer: MEDICARE

## 2018-09-04 VITALS
HEART RATE: 85 BPM | TEMPERATURE: 98 F | OXYGEN SATURATION: 98 % | SYSTOLIC BLOOD PRESSURE: 150 MMHG | DIASTOLIC BLOOD PRESSURE: 82 MMHG | WEIGHT: 206.56 LBS | BODY MASS INDEX: 27.37 KG/M2 | HEIGHT: 73 IN

## 2018-09-04 DIAGNOSIS — D51.9 ANEMIA DUE TO VITAMIN B12 DEFICIENCY, UNSPECIFIED B12 DEFICIENCY TYPE: ICD-10-CM

## 2018-09-04 DIAGNOSIS — N41.0 ACUTE PROSTATITIS: ICD-10-CM

## 2018-09-04 DIAGNOSIS — N18.5 CHRONIC KIDNEY DISEASE, STAGE 5: ICD-10-CM

## 2018-09-04 DIAGNOSIS — K21.00 GASTROESOPHAGEAL REFLUX DISEASE WITH ESOPHAGITIS: ICD-10-CM

## 2018-09-04 DIAGNOSIS — N39.0 URINARY TRACT INFECTION WITHOUT HEMATURIA, SITE UNSPECIFIED: Primary | ICD-10-CM

## 2018-09-04 LAB
BILIRUB SERPL-MCNC: ABNORMAL MG/DL
BLOOD URINE, POC: 250
COLOR, POC UA: ABNORMAL
GLUCOSE UR QL STRIP: 50
HAPTOGLOB SERPL-MCNC: 29 MG/DL
KETONES UR QL STRIP: ABNORMAL
LEUKOCYTE ESTERASE URINE, POC: ABNORMAL
NITRITE, POC UA: ABNORMAL
PH, POC UA: 5
PROTEIN, POC: 500
SPECIFIC GRAVITY, POC UA: 1015
UROBILINOGEN, POC UA: ABNORMAL

## 2018-09-04 PROCEDURE — 36415 COLL VENOUS BLD VENIPUNCTURE: CPT | Mod: PO

## 2018-09-04 PROCEDURE — 81002 URINALYSIS NONAUTO W/O SCOPE: CPT | Mod: PBBFAC,PO | Performed by: PHYSICIAN ASSISTANT

## 2018-09-04 PROCEDURE — 99999 PR PBB SHADOW E&M-EST. PATIENT-LVL V: CPT | Mod: PBBFAC,,, | Performed by: PHYSICIAN ASSISTANT

## 2018-09-04 PROCEDURE — 99214 OFFICE O/P EST MOD 30 MIN: CPT | Mod: S$PBB,,, | Performed by: PHYSICIAN ASSISTANT

## 2018-09-04 PROCEDURE — 83010 ASSAY OF HAPTOGLOBIN QUANT: CPT

## 2018-09-04 PROCEDURE — 87086 URINE CULTURE/COLONY COUNT: CPT

## 2018-09-04 PROCEDURE — 99215 OFFICE O/P EST HI 40 MIN: CPT | Mod: PBBFAC,PO | Performed by: PHYSICIAN ASSISTANT

## 2018-09-04 RX ORDER — CIPROFLOXACIN 250 MG/1
250 TABLET, FILM COATED ORAL 2 TIMES DAILY
Qty: 40 TABLET | Refills: 0 | Status: SHIPPED | OUTPATIENT
Start: 2018-09-04 | End: 2018-09-11 | Stop reason: ALTCHOICE

## 2018-09-04 NOTE — PROGRESS NOTES
CC : I feel tired    Micheal Hunt is a 77 y.o.   Pt recently discharged from the hospital after being found to have pneumonia  Pt is here for evaluation of anemia.  He is tired  His labs reveal continued anemia with a hemoglobin less than 10  He has  documented renal disease for which she sees nephrology regularly., DR Rojo    His daughter reports that he is extremely fatigued and the patient admits he has been experiencing continued  shortness of breath with minimal exertion he is no longer taking any nonsteroidal anti-inflammatory's.  He has no evidence of melena or hematochezia.  Past Medical History:   Diagnosis Date    Allergy     Anemia, mild 12/15/2014    Arthritis     Gout    Benign essential HTN 3/27/2012    BPH (benign prostatic hyperplasia)     CAD (coronary artery disease) 2006    Chronic kidney disease     due to ibuprofen    Colon polyp     Diverticulosis     Gastritis     GERD (gastroesophageal reflux disease)     History of colon polyps 5/3/2018    HTN (hypertension) 3/27/2012    Hyperlipidemia     LLL pneumonia 6/14/2018    LVH (left ventricular hypertrophy)     Mesenteric ischemia     Murmur, cardiac 3/27/2012    GRICELDA (obstructive sleep apnea)     DOES NOT USE A MACHINE    Sinus problem     Syncope and collapse      He is tolerating norvasc for HTN  He is tolerating proscar which is helping with nocturia     Current Outpatient Medications:     albuterol 90 mcg/actuation inhaler, 2 puffs every 4 hours as needed for cough, wheeze, or shortness of breath, Disp: 3 Inhaler, Rfl: 3    albuterol-ipratropium 2.5mg-0.5mg/3mL (DUO-NEB) 0.5 mg-3 mg(2.5 mg base)/3 mL nebulizer solution, INHALE 1 VIAL BY NEBULIZER EVERY 6 HOURS AS NEEDED FOR WHEEZING, Disp: 270 mL, Rfl: 0    allopurinol (ZYLOPRIM) 100 MG tablet, Take 1 tablet (100 mg total) by mouth once daily., Disp: 90 tablet, Rfl: 1    amLODIPine (NORVASC) 10 MG tablet, Take 1 tablet (10 mg total) by mouth every evening.,  Disp: 90 tablet, Rfl: 1    aspirin (ECOTRIN) 81 MG EC tablet, Take 81 mg by mouth once daily.  , Disp: , Rfl:     atorvastatin (LIPITOR) 80 MG tablet, TAKE 1 TABLET (80 MG TOTAL) BY MOUTH ONCE DAILY., Disp: 90 tablet, Rfl: 3    ciprofloxacin HCl (CIPRO) 250 MG tablet, Take 1 tablet (250 mg total) by mouth 2 (two) times daily. for 20 days, Disp: 40 tablet, Rfl: 0    COLCRYS 0.6 mg tablet, TAKE 1 TABLET (0.6 MG TOTAL) BY MOUTH ONCE DAILY., Disp: , Rfl: 11    cyanocobalamin, vitamin B-12, (VITAMIN B-12) 1,000 mcg Subl, Take 1 tablet by mouth daily as needed. Takes 3,000mcg, Disp: , Rfl:     finasteride (PROSCAR) 5 mg tablet, TAKE 1 TABLET ONCE DAILY., Disp: 90 tablet, Rfl: 3    fluticasone (FLONASE) 50 mcg/actuation nasal spray, 2 sprays (100 mcg total) by Each Nare route once daily. (Patient taking differently: 2 sprays by Each Nare route daily as needed. ), Disp: 3 Bottle, Rfl: 3    gabapentin (NEURONTIN) 100 MG capsule, Take 1 capsule (100 mg total) by mouth every evening., Disp: 90 capsule, Rfl: 4    isosorbide mononitrate (ISMO,MONOKET) 10 mg tablet, Take 1 tablet (10 mg total) by mouth every evening., Disp: 30 tablet, Rfl: 3    meclizine (ANTIVERT) 25 mg tablet, daily as needed. , Disp: , Rfl:     NITROSTAT 0.4 mg SL tablet, PLACE 1 TABLET (0.4 MG TOTAL) UNDER THE TONGUE EVERY 5 (FIVE) MINUTES AS NEEDED FOR CHEST PAIN., Disp: 25 tablet, Rfl: 3    sodium chloride (SALINE NASAL MIST) 3 % Mist, 1 spray by Nasal route 2 (two) times daily., Disp: , Rfl:     traMADol (ULTRAM) 50 mg tablet, , Disp: , Rfl:     zolpidem (AMBIEN) 5 MG Tab, TAKE 1 TABLET (5 MG TOTAL) BY MOUTH NIGHTLY AS NEEDED., Disp: 30 tablet, Rfl: 5     He is tolerating Diovan for hypertension continues taking Ambien to help with insomnia    CONSTITUTIONAL: No fevers, chills, night sweats, + fatigue   SKIN: no rashes or itching  ENT: No headaches, head trauma, vision changes, or eye pain  LYMPH NODES: None noticed   CV: No chest pain,  "palpitations.   RESP:+ dyspnea on exertion,no cough, wheezing, or hemoptysis  GI: No nausea, emesis, diarrhea, constipation, melena, hematochezia, pain.   : No dysuria, hematuria, urgency, or frequency   HEME: No easy bruising, bleeding problems  PSYCHIATRIC: No depression, anxiety, psychosis, hallucinations.  NEURO: No seizures, memory loss, ++dizziness  No difficulty sleeping  MSK: No arthralgias or joint swelling         BP (!) 132/93   Pulse 69   Temp 98 °F (36.7 °C) (Oral)   Resp 18   Ht 6' 1" (1.854 m)   Wt 94.9 kg (209 lb 3.5 oz)   BMI 27.60 kg/m²   Gen: NAD, A and O x3,   Psych: pleasant affect, normal thought process  Eyes: Pupils round and non dilated, EOM intact  Nose: Nares patent  OP clear, mucosa patent  Neck: suppple, no JVD, trachea midline no thyromegaly   Lungs: CTAB,  no use of accessory muscles  CV: S1S2 with RR  ++ MURMUR   Abd: soft, NTND, + BS, No HSM, no ascites  Extr: No CCE, CLARITA  Neuro: steady gait  AV fistula in left forearm   Skin: intact, no lesions noted  Rheum: No joint swelling    Lab Results   Component Value Date    WBC 6.80 08/30/2018    HGB 9.5 (L) 08/30/2018    HCT 28.9 (L) 08/30/2018    MCV 91 08/30/2018     (L) 08/30/2018     CMP  Sodium   Date Value Ref Range Status   08/30/2018 136 136 - 145 mmol/L Final     Potassium   Date Value Ref Range Status   08/30/2018 3.6 3.5 - 5.1 mmol/L Final     Chloride   Date Value Ref Range Status   08/30/2018 103 95 - 110 mmol/L Final     CO2   Date Value Ref Range Status   08/30/2018 22 (L) 23 - 29 mmol/L Final     Glucose   Date Value Ref Range Status   08/30/2018 93 70 - 110 mg/dL Final     BUN, Bld   Date Value Ref Range Status   08/30/2018 85 (H) 8 - 23 mg/dL Final     Creatinine   Date Value Ref Range Status   08/30/2018 6.4 (H) 0.5 - 1.4 mg/dL Final   08/08/2013 1.5 (H) 0.5 - 1.4 mg/dL Final     Calcium   Date Value Ref Range Status   08/30/2018 9.6 8.7 - 10.5 mg/dL Final   08/08/2013 9.0 8.7 - 10.5 mg/dL Final     Total " Protein   Date Value Ref Range Status   08/29/2018 6.8 6.0 - 8.4 g/dL Final     Albumin   Date Value Ref Range Status   08/29/2018 3.8 3.5 - 5.2 g/dL Final   09/12/2013 4.3 3.6 - 5.1 g/dL Final     Comment:     @ Test Performed By:  Ringio Paula JACK Contreras M.D.,   [id number] [place] [id number]  [facility] #[id number]     Total Bilirubin   Date Value Ref Range Status   08/29/2018 0.4 0.1 - 1.0 mg/dL Final     Comment:     For infants and newborns, interpretation of results should be based  on gestational age, weight and in agreement with clinical  observations.  Premature Infant recommended reference ranges:  Up to 24 hours.............<8.0 mg/dL  Up to 48 hours............<12.0 mg/dL  3-5 days..................<15.0 mg/dL  6-29 days.................<15.0 mg/dL       Alkaline Phosphatase   Date Value Ref Range Status   08/29/2018 63 55 - 135 U/L Final     AST   Date Value Ref Range Status   08/29/2018 17 10 - 40 U/L Final     ALT   Date Value Ref Range Status   08/29/2018 21 10 - 44 U/L Final     Anion Gap   Date Value Ref Range Status   08/30/2018 11 8 - 16 mmol/L Final   08/08/2013 11 5 - 15 meq/L Final     eGFR if    Date Value Ref Range Status   08/30/2018 9 (A) >60 mL/min/1.73 m^2 Final     eGFR if non    Date Value Ref Range Status   08/30/2018 8 (A) >60 mL/min/1.73 m^2 Final     Comment:     Calculation used to obtain the estimated glomerular filtration  rate (eGFR) is the CKD-EPI equation.         Ref Range & Units 9d ago   Methlymalonic Acid <0.40 umol/L 0.53     Comment: If applicable, any drug confirmation testing reported    Haptoglobin decreased yet up to 29  Elevated kappa and llambda yet normal ratio    Anemia of chronic disease    Thrombocytopenia    CKD stage 4 secondary to hypertension    Gastroesophageal reflux disease with esophagitis    Dizziness    Abdominal obesity    Erythropoietin (EPO) stimulating  agent anemia management patient      Not on dialysis   To start procrit weekly times 2 to stimulate marrow   Continue  B12 1000 mcg SL daily, pt qualifies for procrit as well given his renal function  Recheck haptoglobin in 3 weeks to reassess for hemolysis : may need a course of steroids  Explained that he does not need IV iron and he should be cleared for Procrit  Goal of hemoglobin is 11 and above  He is to continue usual medications for hypertension being Norvasc  Continue Neurontin for intermittent paresthesias   Return to clinic approximately 3- weeks to assess the need for further erythropoietin intervention    Thank you for allowing me to evaluate and participate in the care of this pleasant patient. Please fell free to call me with any questions or concerns.    Mikala Frankel MD    This note was dictated with Dragon and briefly proofread. Please excuse any sentences that may be unclear or words misspelled

## 2018-09-04 NOTE — PROGRESS NOTES
Subjective:       Patient ID: Micheal Hunt is a 77 y.o. male.    Chief Complaint: Urinary Tract Infection ( and burning in lungs, elevated pressure)    HPI   Dysuria and frequency x 1 mos  Burning ing chest  And describes reflux  Recent pneumonia  Wish in [facility].  Review of Systems   Constitutional: Positive for chills and fatigue. Negative for activity change, appetite change, diaphoresis, fever and unexpected weight change.   HENT: Negative.    Eyes: Negative.    Respiratory: Positive for cough and wheezing.    Cardiovascular: Negative.  Negative for chest pain and leg swelling.   Gastrointestinal: Negative.    Endocrine: Negative.    Genitourinary: Positive for dysuria, frequency and urgency. Negative for hematuria.   Musculoskeletal: Negative.    Skin: Negative.  Negative for rash.   Neurological: Negative.        Objective:      Physical Exam   Constitutional: He appears well-developed and well-nourished. No distress.   HENT:   Head: Normocephalic and atraumatic.   Right Ear: External ear normal.   Left Ear: External ear normal.   Nose: Nose normal.   Mouth/Throat: Oropharynx is clear and moist. No oropharyngeal exudate.   Eyes: Conjunctivae are normal. No scleral icterus.   Neck: Normal range of motion. Neck supple. No tracheal deviation present. No thyromegaly present.   Cardiovascular: Normal rate, regular rhythm, normal heart sounds and intact distal pulses. Exam reveals no gallop and no friction rub.   No murmur heard.  Pulmonary/Chest: Effort normal and breath sounds normal. No stridor. No respiratory distress. He has no wheezes. He has no rales.   Abdominal: Soft. Bowel sounds are normal. He exhibits no distension and no mass. There is tenderness. There is no rebound and no guarding. No hernia.   Mid epigastric tenderness  No organomegaly   Genitourinary:   Genitourinary Comments: Prostate enlarged   L side boggy and tender   Musculoskeletal: He exhibits no edema.   Lymphadenopathy:      He has no cervical adenopathy.   Skin: Skin is warm and dry. No rash noted.   Vitals reviewed.      Assessment:       1. Urinary tract infection without hematuria, site unspecified    2. Acute prostatitis    3. Chronic kidney disease, stage 5    4. Gastroesophageal reflux disease with esophagitis        Plan:       Urinary tract infection without hematuria, site unspecified  -     POCT URINE DIPSTICK WITHOUT MICROSCOPE  -     Cancel: URINALYSIS  -     Urine culture    Acute prostatitis  -     POCT URINE DIPSTICK WITHOUT MICROSCOPE  -     Cancel: URINALYSIS  -     ciprofloxacin HCl (CIPRO) 250 MG tablet; Take 1 tablet (250 mg total) by mouth 2 (two) times daily. for 20 days  Dispense: 40 tablet; Refill: 0  -     Urine culture    Chronic kidney disease, stage 5  -     POCT URINE DIPSTICK WITHOUT MICROSCOPE  -     Cancel: URINALYSIS  -     Urine culture    Gastroesophageal reflux disease with esophagitis  -     POCT URINE DIPSTICK WITHOUT MICROSCOPE  -     Cancel: URINALYSIS  -     Urine culture    Other orders  -     Cancel: Urine culture    hydrate well  Discussed otc's including Gaviscon  Discussed diet with no late eating  Pt will f/u with nephrology in next few days

## 2018-09-06 ENCOUNTER — OFFICE VISIT (OUTPATIENT)
Dept: HEMATOLOGY/ONCOLOGY | Facility: CLINIC | Age: 78
End: 2018-09-06
Payer: MEDICARE

## 2018-09-06 VITALS
TEMPERATURE: 98 F | HEIGHT: 73 IN | DIASTOLIC BLOOD PRESSURE: 93 MMHG | RESPIRATION RATE: 18 BRPM | WEIGHT: 209.19 LBS | BODY MASS INDEX: 27.73 KG/M2 | SYSTOLIC BLOOD PRESSURE: 132 MMHG | HEART RATE: 69 BPM

## 2018-09-06 DIAGNOSIS — D63.8 ANEMIA OF CHRONIC DISEASE: Primary | ICD-10-CM

## 2018-09-06 DIAGNOSIS — N18.4 CKD STAGE 4 SECONDARY TO HYPERTENSION: ICD-10-CM

## 2018-09-06 DIAGNOSIS — D69.6 THROMBOCYTOPENIA: ICD-10-CM

## 2018-09-06 DIAGNOSIS — D64.9 ERYTHROPOIETIN (EPO) STIMULATING AGENT ANEMIA MANAGEMENT PATIENT: ICD-10-CM

## 2018-09-06 DIAGNOSIS — R42 DIZZINESS: ICD-10-CM

## 2018-09-06 DIAGNOSIS — I12.9 CKD STAGE 4 SECONDARY TO HYPERTENSION: ICD-10-CM

## 2018-09-06 DIAGNOSIS — E65 ABDOMINAL OBESITY: ICD-10-CM

## 2018-09-06 DIAGNOSIS — Z79.899 ERYTHROPOIETIN (EPO) STIMULATING AGENT ANEMIA MANAGEMENT PATIENT: ICD-10-CM

## 2018-09-06 DIAGNOSIS — K21.00 GASTROESOPHAGEAL REFLUX DISEASE WITH ESOPHAGITIS: ICD-10-CM

## 2018-09-06 LAB — BACTERIA UR CULT: NO GROWTH

## 2018-09-06 PROCEDURE — 99999 PR PBB SHADOW E&M-EST. PATIENT-LVL IV: CPT | Mod: PBBFAC,,, | Performed by: INTERNAL MEDICINE

## 2018-09-06 PROCEDURE — 99214 OFFICE O/P EST MOD 30 MIN: CPT | Mod: PBBFAC,PO | Performed by: INTERNAL MEDICINE

## 2018-09-06 PROCEDURE — 99215 OFFICE O/P EST HI 40 MIN: CPT | Mod: S$PBB,,, | Performed by: INTERNAL MEDICINE

## 2018-09-07 ENCOUNTER — CLINICAL SUPPORT (OUTPATIENT)
Dept: REHABILITATION | Facility: HOSPITAL | Age: 78
End: 2018-09-07
Payer: MEDICARE

## 2018-09-07 DIAGNOSIS — R42 DIZZINESS: ICD-10-CM

## 2018-09-07 DIAGNOSIS — R26.89 BALANCE PROBLEM: ICD-10-CM

## 2018-09-07 PROCEDURE — 97140 MANUAL THERAPY 1/> REGIONS: CPT | Mod: PN

## 2018-09-07 PROCEDURE — 97110 THERAPEUTIC EXERCISES: CPT | Mod: PN

## 2018-09-10 NOTE — PROGRESS NOTES
"Name: Micheal Acosta [facility] Number: [id number]  Date of Treatment: 09/07/2018   Diagnosis:   Encounter Diagnosis   Name Primary?    Dizziness        Time in: 1113  Time Out: 1155  Total Treatment Time: 35  Group Time: 0      Subjective:    Micheal reports not doing well.  "Got jerked by a horse and hurt the L shoulder and arm." Patient reports their pain to be n/a/10 on a 0-10 scale with 0 being no pain and 10 being the worst pain imaginable.    Objective    Patient received individual therapy to increase stability in standing, functional activities and gait with 0 patients with activities as follows:     Micheal received therapeutic exercises to improve dizziness for 27 minutes including:    [place] ex: seated eyes open:  Eyes side <> side and up/down x 20 ea    Head turns side<>side, up/down x 20 ea (significant difficulty due to cervical stiffness)   Eyes closed: head side<>side, up/down x 20 ea   Standing gastroc stretch 3x30s   Standing SLS on airex w/eyes closed 3x30s ea   Standing on airex tandem stance with eyes closed 3x30s ea    Micheal received the following manual therapy techniques: subocc release, grade 2 manual cervical traction, PROM were applied to the: c-spine for 13 minutes.     Written Home Exercises Provided: Reviewed.    Pt demo fair understanding of the education provided. Micheal demonstrated fair return demonstration of activities.     Assessment:   Pt demonstrated improved cervical ROM which allowed for improved ability to perform C[place] exercises.      Pt will continue to benefit from skilled PT intervention. Medical Necessity is demonstrated by:  Fall Risk, Unable to participate fully in daily activities, Requires skilled supervision to complete and progress HEP and impaired cervical ROM which interferes with balance activities.      Patient is making fair progress towards established goals.    New/Revised goals: N/A      Plan:  Continue with established Plan of Care towards PT goals.   "

## 2018-09-11 ENCOUNTER — LAB VISIT (OUTPATIENT)
Dept: LAB | Facility: HOSPITAL | Age: 78
End: 2018-09-11
Attending: FAMILY MEDICINE
Payer: MEDICARE

## 2018-09-11 ENCOUNTER — OFFICE VISIT (OUTPATIENT)
Dept: FAMILY MEDICINE | Facility: CLINIC | Age: 78
End: 2018-09-11
Payer: MEDICARE

## 2018-09-11 VITALS
TEMPERATURE: 98 F | HEIGHT: 73 IN | WEIGHT: 209.44 LBS | BODY MASS INDEX: 27.76 KG/M2 | DIASTOLIC BLOOD PRESSURE: 69 MMHG | HEART RATE: 67 BPM | SYSTOLIC BLOOD PRESSURE: 128 MMHG

## 2018-09-11 DIAGNOSIS — N41.0 ACUTE PROSTATITIS: Primary | ICD-10-CM

## 2018-09-11 DIAGNOSIS — D50.1 IRON DEFICIENCY ANEMIA DUE TO SIDEROPENIC DYSPHAGIA: ICD-10-CM

## 2018-09-11 DIAGNOSIS — I35.0 AORTIC STENOSIS, SEVERE: ICD-10-CM

## 2018-09-11 LAB
IRON SERPL-MCNC: 39 UG/DL
SATURATED IRON: 12 %
TOTAL IRON BINDING CAPACITY: 333 UG/DL
TRANSFERRIN SERPL-MCNC: 225 MG/DL

## 2018-09-11 PROCEDURE — 99213 OFFICE O/P EST LOW 20 MIN: CPT | Mod: PBBFAC,PO,25 | Performed by: FAMILY MEDICINE

## 2018-09-11 PROCEDURE — 99999 PR PBB SHADOW E&M-EST. PATIENT-LVL III: CPT | Mod: PBBFAC,,, | Performed by: FAMILY MEDICINE

## 2018-09-11 PROCEDURE — 36415 COLL VENOUS BLD VENIPUNCTURE: CPT | Mod: PO

## 2018-09-11 PROCEDURE — 99214 OFFICE O/P EST MOD 30 MIN: CPT | Mod: S$PBB,,, | Performed by: FAMILY MEDICINE

## 2018-09-11 PROCEDURE — 83540 ASSAY OF IRON: CPT

## 2018-09-11 PROCEDURE — 90662 IIV NO PRSV INCREASED AG IM: CPT | Mod: PBBFAC,PO

## 2018-09-11 RX ORDER — IRON POLYSACCHARIDE COMPLEX 150 MG
150 CAPSULE ORAL
Qty: 60 CAPSULE | Refills: 11 | Status: SHIPPED | OUTPATIENT
Start: 2018-09-11 | End: 2018-11-21

## 2018-09-11 RX ORDER — GABAPENTIN 100 MG/1
300 CAPSULE ORAL NIGHTLY
Qty: 270 CAPSULE | Refills: 4 | Status: SHIPPED | OUTPATIENT
Start: 2018-09-11 | End: 2018-10-10 | Stop reason: SDUPTHER

## 2018-09-11 RX ORDER — POLYETHYLENE GLYCOL 3350 17 G/17G
17 POWDER, FOR SOLUTION ORAL
Qty: 1 BOTTLE | Refills: 4 | Status: SHIPPED | OUTPATIENT
Start: 2018-09-11 | End: 2019-01-15

## 2018-09-11 NOTE — PATIENT INSTRUCTIONS
Discharge Instructions for Aortic Valve Stenosis  You have been diagnosed with aortic valve stenosis. This means the aortic valve in your heart is stiff or remains in a near-closed position and has trouble opening. Because of this, your heart has to work harder to push the blood through the valve. In some cases, this extra work makes the muscle of the heart thicken. The extra work can tire the heart and cause its muscle to weaken over time. This condition often changes very slowly and doesn't need to be treated. But in some people, it may get worse faster and need to be treated. Some people can control their stenosis with medicines. In some severe cases, surgery is needed.  Home care  · Check with your doctor before taking any over-the-counter medicines, herbal products, or vitamins.  · Take your medicines exactly as directed. Dont skip doses.  · Keep all follow-up appointments. Some people with aortic stenosis dont have symptoms. Others need close follow-up and surgery.  Lifestyle changes  · Maintain a healthy weight. Get help to lose any extra pounds.  · Ask your doctor if an exercise program is right for you. Some people with aortic stenosis need to be very careful about exercise as it can result in fainting.  · Break the smoking habit. Enroll in a stop-smoking program to improve your chances of success.  Follow-up care  Make a follow-up appointment with your healthcare provider, or as directed.  When to call 911  Call 911 or go to the emergency room right away if you have any of the following:  · Chest pain or shortness of breath  · Weakness in the muscles of your face, arms, or legs  · Trouble speaking  · Rapid pulse or pounding heartbeat  · Fainting or dizziness  · Sudden vertigo   Date Last Reviewed: 6/1/2016  © 0218-6697 [employer]. [address] [place] [address], [place] [address]. All rights reserved. This information is not intended as a substitute for professional medical care. Always follow  your healthcare professional's instructions.        Polyethylene Glycol powder  What is this medicine?  POLYETHYLENE GLYCOL 3350 (nenita ee ETH i adiel; GLYE col) powder is a laxative used to treat constipation. It increases the amount of water in the stool. Bowel movements become easier and more frequent.  How should I use this medicine?  Take this medicine by mouth. The bottle has a measuring cap that is marked with a line. Pour the powder into the cap up to the marked line (the dose is about 1 heaping tablespoon). Add the powder in the cap to a full glass (4 to 8 ounces or 120 to 240 ml) of water, juice, soda, coffee or tea. Mix the powder well. Drink the solution. Take exactly as directed. Do not take your medicine more often than directed.  Talk to your pediatrician regarding the use of this medicine in children. Special care may be needed.  What side effects may I notice from receiving this medicine?  Side effects that you should report to your doctor or health care professional as soon as possible:  · diarrhea  · difficulty breathing  · itching of the skin, hives, or skin rash  · severe bloating, pain, or distension of the stomach  · vomiting  Side effects that usually do not require medical attention (report to your doctor or health care professional if they continue or are bothersome):  · bloating or gas  · lower abdominal discomfort or cramps  · nausea  What may interact with this medicine?  Interactions are not expected.  What if I miss a dose?  If you miss a dose, take it as soon as you can. If it is almost time for your next dose, take only that dose. Do not take double or extra doses.  Where should I keep my medicine?  Keep out of the reach of children.  Store between 15 and 30 degrees C (59 and 86 degrees F). Throw away any unused medicine after the expiration date.  What should I tell my health care provider before I take this medicine?  They need to know if you have any of these conditions:  · a history  of blockage of the stomach or intestine  · current abdomen distension or pain  · difficulty swallowing  · diverticulitis, ulcerative colitis, or other chronic bowel disease  · phenylketonuria  · an unusual or allergic reaction to polyethylene glycol, other medicines, dyes, or preservatives  · pregnant or trying to get pregnant  · breast-feeding  What should I watch for while using this medicine?  Do not use for more than 2 weeks without advice from your doctor or health care professional. It can take 2 to 4 days to have a bowel movement and to experience improvement in constipation. See your health care professional for any changes in bowel habits, including constipation, that are severe or last longer than three weeks.  Always take this medicine with plenty of water.  NOTE:This sheet is a summary. It may not cover all possible information. If you have questions about this medicine, talk to your doctor, pharmacist, or health care provider. Copyright© 2017 [employer]

## 2018-09-14 DIAGNOSIS — D64.9 ANEMIA, UNSPECIFIED TYPE: Primary | ICD-10-CM

## 2018-09-17 ENCOUNTER — LAB VISIT (OUTPATIENT)
Dept: LAB | Facility: HOSPITAL | Age: 78
End: 2018-09-17
Attending: INTERNAL MEDICINE
Payer: MEDICARE

## 2018-09-17 DIAGNOSIS — D64.9 ANEMIA, UNSPECIFIED TYPE: ICD-10-CM

## 2018-09-17 LAB
BASOPHILS # BLD AUTO: 0 K/UL
BASOPHILS NFR BLD: 0.4 %
DIFFERENTIAL METHOD: ABNORMAL
EOSINOPHIL # BLD AUTO: 0.4 K/UL
EOSINOPHIL NFR BLD: 4.9 %
ERYTHROCYTE [DISTWIDTH] IN BLOOD BY AUTOMATED COUNT: 14.3 %
HCT VFR BLD AUTO: 31.9 %
HGB BLD-MCNC: 10.9 G/DL
LYMPHOCYTES # BLD AUTO: 1.7 K/UL
LYMPHOCYTES NFR BLD: 23.3 %
MCH RBC QN AUTO: 30.9 PG
MCHC RBC AUTO-ENTMCNC: 34.3 G/DL
MCV RBC AUTO: 90 FL
MONOCYTES # BLD AUTO: 0.5 K/UL
MONOCYTES NFR BLD: 6.7 %
NEUTROPHILS # BLD AUTO: 4.8 K/UL
NEUTROPHILS NFR BLD: 64.7 %
PLATELET # BLD AUTO: 181 K/UL
PMV BLD AUTO: 7.3 FL
RBC # BLD AUTO: 3.55 M/UL
WBC # BLD AUTO: 7.4 K/UL

## 2018-09-17 PROCEDURE — 36415 COLL VENOUS BLD VENIPUNCTURE: CPT

## 2018-09-17 PROCEDURE — 85025 COMPLETE CBC W/AUTO DIFF WBC: CPT

## 2018-09-18 ENCOUNTER — PATIENT MESSAGE (OUTPATIENT)
Dept: FAMILY MEDICINE | Facility: CLINIC | Age: 78
End: 2018-09-18

## 2018-09-18 ENCOUNTER — TELEPHONE (OUTPATIENT)
Dept: FAMILY MEDICINE | Facility: CLINIC | Age: 78
End: 2018-09-18

## 2018-09-18 DIAGNOSIS — R42 VERTIGO: ICD-10-CM

## 2018-09-18 RX ORDER — MECLIZINE HYDROCHLORIDE 25 MG/1
25 TABLET ORAL 3 TIMES DAILY PRN
Qty: 90 TABLET | Refills: 1 | Status: SHIPPED | OUTPATIENT
Start: 2018-09-18 | End: 2018-10-23 | Stop reason: SDUPTHER

## 2018-09-18 RX ORDER — MECLIZINE HYDROCHLORIDE 25 MG/1
25 TABLET ORAL DAILY PRN
Qty: 90 TABLET | Refills: 0 | Status: CANCELLED | OUTPATIENT
Start: 2018-09-18

## 2018-09-18 NOTE — TELEPHONE ENCOUNTER
Patient requesting same day appointment related to cold symptoms. States he was advised by Dr. Lakhani to always schedule an appointment if he gets a cold. Patient states he has a runny nose, chills, and cough. States coughing up phlegm but unsure of the color. Denies fever. Appointment scheduled for tomorrow (9-19-18). Patient agreed to appointment date and time.

## 2018-09-18 NOTE — TELEPHONE ENCOUNTER
----- Message from Latoya Loomis sent at 9/18/2018  9:06 AM CDT -----  Type:  Same Day Appointment Request    Caller is requesting a same day appointment.      Name of Caller:  Patient  When is the first available appointment?  Next week  Symptoms:  Upper respiratory/cold symptoms  Best Call Back Number:  [phone]  Additional Information:   Patient has kidney disease

## 2018-09-18 NOTE — TELEPHONE ENCOUNTER
----- Message from Latoya Marshall sent at 9/18/2018 10:55 AM CDT -----  Type:  Patient Returning Call    Who Called:  Patient   Who Left Message for Patient:  Maya Thomas  Does the patient know what this is regarding?:  ?  Best Call Back Number:  [phone]  Additional Information:

## 2018-09-19 ENCOUNTER — OFFICE VISIT (OUTPATIENT)
Dept: FAMILY MEDICINE | Facility: CLINIC | Age: 78
End: 2018-09-19
Payer: MEDICARE

## 2018-09-19 VITALS
BODY MASS INDEX: 27.93 KG/M2 | RESPIRATION RATE: 19 BRPM | DIASTOLIC BLOOD PRESSURE: 62 MMHG | OXYGEN SATURATION: 97 % | HEIGHT: 73 IN | HEART RATE: 95 BPM | TEMPERATURE: 99 F | SYSTOLIC BLOOD PRESSURE: 120 MMHG | WEIGHT: 210.75 LBS

## 2018-09-19 DIAGNOSIS — R42 DIZZINESS: ICD-10-CM

## 2018-09-19 DIAGNOSIS — N18.4 ANEMIA DUE TO STAGE 4 CHRONIC KIDNEY DISEASE: ICD-10-CM

## 2018-09-19 DIAGNOSIS — D63.1 ANEMIA DUE TO STAGE 4 CHRONIC KIDNEY DISEASE: ICD-10-CM

## 2018-09-19 DIAGNOSIS — J01.00 ACUTE MAXILLARY SINUSITIS, RECURRENCE NOT SPECIFIED: Primary | ICD-10-CM

## 2018-09-19 DIAGNOSIS — I35.2 NONRHEUMATIC AORTIC INSUFFICIENCY WITH AORTIC STENOSIS: ICD-10-CM

## 2018-09-19 DIAGNOSIS — I50.812 CHRONIC RIGHT-SIDED HEART FAILURE: ICD-10-CM

## 2018-09-19 DIAGNOSIS — N25.81 SECONDARY HYPERPARATHYROIDISM: ICD-10-CM

## 2018-09-19 PROCEDURE — 99215 OFFICE O/P EST HI 40 MIN: CPT | Mod: PBBFAC,PO | Performed by: PHYSICIAN ASSISTANT

## 2018-09-19 PROCEDURE — 99999 PR PBB SHADOW E&M-EST. PATIENT-LVL V: CPT | Mod: PBBFAC,,, | Performed by: PHYSICIAN ASSISTANT

## 2018-09-19 PROCEDURE — 99214 OFFICE O/P EST MOD 30 MIN: CPT | Mod: S$PBB,,, | Performed by: PHYSICIAN ASSISTANT

## 2018-09-19 RX ORDER — DOXYCYCLINE HYCLATE 100 MG
100 TABLET ORAL 2 TIMES DAILY
Qty: 20 TABLET | Refills: 0 | Status: SHIPPED | OUTPATIENT
Start: 2018-09-19 | End: 2018-09-28 | Stop reason: ALTCHOICE

## 2018-09-19 NOTE — PROGRESS NOTES
"Subjective:       Patient ID: Micheal Hunt is a 77 y.o. male.    Chief Complaint: Cough; Dizziness; Headache; and Sore Throat    HPI     76 yo male presents today for cold symptoms. He went to a  last Saturday and was around a lot of people who were sick. He reports having a cough with mucous, sore throat, headache, chills and sweat, fatigue, body aches, a slightly decreased appetite, and diarrhea that is relieved with Pepto-bismol. He also reports "burrning in chest" that is relieved with his humidifier. He reported that he has been also feeling dizzy and had a dizzy spell last Friday while shopping. He explains that he is going to PT for this. He added that the dizziness stops if his left eye is covered up with a patch. This makes it easier for him to drive. He denies having ear pain, chest pain, or shortness of breath. He has only used OTC remedies (tea, Jorje's vapor rub, humidifier) to help with this. The patient received his flu vaccine at his visit last week. No other concerns at this time.         Review of Systems   Constitutional: Positive for appetite change, chills and fatigue. Negative for activity change and fever.        Slight decrease in appetite    HENT: Positive for sore throat. Negative for congestion, ear pain, postnasal drip and rhinorrhea.    Eyes: Negative for pain, itching and visual disturbance.   Respiratory: Positive for cough. Negative for shortness of breath and wheezing.         With mucous   Cardiovascular: Negative for chest pain.   Gastrointestinal: Positive for diarrhea. Negative for abdominal distention, abdominal pain, constipation and nausea.   Genitourinary: Negative for difficulty urinating, dysuria, frequency, hematuria and urgency.   Musculoskeletal: Negative for arthralgias, back pain, myalgias and neck pain.   Skin: Negative for color change, pallor and rash.   Neurological: Positive for dizziness and headaches. Negative for syncope.        Dizziness is " chronic- takes meclizine. Attending PT   Hematological: Negative for adenopathy.   Psychiatric/Behavioral: Negative for behavioral problems. The patient is not nervous/anxious.        Objective:      Physical Exam   Constitutional: He is oriented to person, place, and time. Vital signs are normal. He appears well-nourished. No distress.   HENT:   Left Ear: External ear normal.   Mouth/Throat: No oropharyngeal exudate.   Posterior oropharynx mildly erythematous with post nasal drip present  Maxillary sinuses TTP  Nasal turbinates mildly edematous   Eyes: Conjunctivae are normal. Pupils are equal, round, and reactive to light.   Neck: Normal range of motion. No thyromegaly present.   Cardiovascular: Normal rate and regular rhythm.   Murmur heard.  Pulmonary/Chest: Effort normal. No respiratory distress. He has no wheezes.   Mild coarse breath sounds in upper lung fields   Musculoskeletal: Normal range of motion. He exhibits no edema.   Ambulating with cane  Brace on left wrist   Lymphadenopathy:     He has no cervical adenopathy.   Neurological: He is alert and oriented to person, place, and time.   Skin: Skin is warm and dry.   Psychiatric: He has a normal mood and affect.       Assessment:       1. Acute maxillary sinusitis, recurrence not specified    2. Secondary hyperparathyroidism    3. Anemia due to stage 4 chronic kidney disease    4. Chronic right-sided heart failure    5. Nonrheumatic aortic insufficiency with aortic stenosis, moderate    6. Dizziness        Plan:   Micheal was seen today for cough, dizziness, headache and sore throat.    Diagnoses and all orders for this visit:    Acute maxillary sinusitis, recurrence not specified  -     doxycycline (VIBRA-TABS) 100 MG tablet; Take 1 tablet (100 mg total) by mouth 2 (two) times daily. for 10 days  Take antibiotics with food.  Increase fluid intake.  Call the clinic if symptoms worsen, new symptoms develop or if you are not any better after completion of your  antibiotics.    Avoid sun exposure while taking this medication  Follow up early next week to reassess and confirm improvement.   Secondary hyperparathyroidism  Last PTH normal.  Follow up with Dr. Rojo  Anemia due to stage 4 chronic kidney disease  Chronic  Follow up with Dr. Wells and with Dr. Rojo  Chronic right-sided heart failure  Continue follow up with Dr. Dunlap  Nonrheumatic aortic insufficiency with aortic stenosis, moderate  Continue follow up with Dr. Dunlap  Dizziness  Chronic  Attending PT and taking meclizine for this.

## 2018-09-20 NOTE — PROGRESS NOTES
Subjective:       Patient ID: Micheal Hunt is a 77 y.o. male.    Chief Complaint: Hospital fu for Pneumonia; Headache; Dizziness; and Constipation    76 y/o male with incidental rt lobe infiltrate. Treated as pneumonia, but b/c were negative, no fevers. Rt lung infiltrate most likely as rt heart failure but milds symptoms. He was compliant with medications.He has severe aortic stenosis and TITO,       Pneumonia   He complains of cough. There is no shortness of breath or wheezing. Pertinent negatives include no ear pain, fever, postnasal drip, rhinorrhea, sneezing or sore throat.     Review of Systems   Constitutional: Positive for fatigue. Negative for chills and fever.   HENT: Negative for ear discharge, ear pain, postnasal drip, rhinorrhea, sinus pressure, sneezing and sore throat.    Respiratory: Positive for cough. Negative for shortness of breath and wheezing.    Neurological: Positive for dizziness.       Patient Active Problem List   Diagnosis    Osteoarthritis of right knee, L-TKR 10/2012    Nocturia    Hematuria    Carotid stenosis    Essential hypertension    Hyperlipidemia, baseline     CAD (coronary artery disease), 2V CABG 2007    Gout, arthritis    Dizziness    LVH (left ventricular hypertrophy) due to hypertensive disease    Abnormal cardiovascular stress test    GERD (gastroesophageal reflux disease)    Elevated PSA    Acquired hammer toe    Diastolic dysfunction    Abdominal obesity    Back pain, chronic    Glucose intolerance (impaired glucose tolerance)    Anemia of chronic disease    Gastric nodule    Diverticulitis, 2005, 2015    Personal history of colonic polyps    PSA elevation    DDD (degenerative disc disease), lumbar    LV dysfunction, EF 50%, reduced to 34%, now 46%    Other spondylosis, lumbar region    Knee pain    BPH with obstruction/lower urinary tract symptoms    Itching, both legs, onset 7/2017    Chronic sinusitis    Mild persistent  asthma without complication    Sigmoid diverticulitis, 3 episodes, 2005, 2015, 2018    Anemia due to stage 4 chronic kidney disease    Thrombocytopenia    Renal cyst    Unilateral inguinal hernia    Chronic kidney disease, stage 5    Generalized abdominal pain    Chronic right-sided heart failure    Nonrheumatic aortic insufficiency with aortic stenosis, moderate    Symptomatic bradycardia    Secondary hyperparathyroidism       Objective:      Physical Exam   Constitutional: He is oriented to person, place, and time. He appears well-developed and well-nourished. No distress.   HENT:   Head: Normocephalic and atraumatic.   Right Ear: External ear normal.   Left Ear: External ear normal.   Nose: Nose normal.   Mouth/Throat: Oropharynx is clear and moist. No oropharyngeal exudate.   Eyes: Conjunctivae and EOM are normal. Pupils are equal, round, and reactive to light. Right eye exhibits no discharge. Left eye exhibits no discharge. No scleral icterus.   Neck: Normal range of motion. Neck supple. No JVD present. No tracheal deviation present. No thyromegaly present.   Cardiovascular: Normal rate and intact distal pulses.   Murmur heard.   Systolic murmur is present with a grade of 3/6.  Pulmonary/Chest: Effort normal and breath sounds normal. No respiratory distress. He has no wheezes. He has no rales.   Abdominal: Soft. Bowel sounds are normal. He exhibits no distension and no mass. There is no tenderness. There is no rebound and no guarding.   Musculoskeletal: He exhibits no edema or tenderness.          Lymphadenopathy:     He has no cervical adenopathy.   Neurological: He is alert and oriented to person, place, and time. No cranial nerve deficit. Coordination normal.   Skin: Skin is warm and dry. No rash noted. He is not diaphoretic. No erythema. No pallor.   Psychiatric: He has a normal mood and affect. His behavior is normal. Judgment and thought content normal.   Vitals reviewed.      Lab Results    Component Value Date    WBC 7.40 09/17/2018    HGB 10.9 (L) 09/17/2018    HCT 31.9 (L) 09/17/2018     09/17/2018    CHOL 89 (L) 07/26/2018    TRIG 68 07/26/2018    HDL 29 (L) 07/26/2018    ALT 21 08/29/2018    AST 17 08/29/2018     08/30/2018    K 3.6 08/30/2018     08/30/2018    CREATININE 6.4 (H) 08/30/2018    BUN 85 (H) 08/30/2018    CO2 22 (L) 08/30/2018    TSH 1.885 08/29/2018    PSA 5.6 (H) 07/26/2018    INR 1.0 07/26/2018    HGBA1C 6.2 02/13/2015     The ASCVD Risk score (Bhavin ALVAREZ Jr., et al., 2013) failed to calculate for the following reasons:    The valid total cholesterol range is 130 to 320 mg/dL    Assessment:       1. Acute prostatitis    2. 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  Dispense: 60 capsule; Refill: 11    Aortic stenosis, severe    Other orders  -     polyethylene glycol (GLYCOLAX) 17 gram/dose powder; Take 17 g by mouth 2 (two) times daily before meals.  Dispense: 1 Bottle; Refill: 4  -     gabapentin (NEURONTIN) 100 MG capsule; Take 3 capsules (300 mg total) by mouth every evening.  Dispense: 270 capsule; Refill: 4  -     Influenza - High Dose (65+) (PF) (IM)      Patient readiness: acceptance and barriers:social stressors    During the course of the visit the patient was educated and counseled about the following:     Hypertension:   Regular aerobic exercise.    Goals: Hypertension: Reduce Blood Pressure    Did patient meet  goals/outcomes: Yes    The following self management tools provided: blood pressure log  excercise log    Patient Instructions (the written plan) was given to the patient/family.     Time spent with patient: 30 minutes    Barriers to medications present (yes )    Adverse reactions to current medications (yes)    Over the counter medications reviewed (Yes)        40-minute visit. 30 minutes spent counseling patient on diet, exercise, and weight loss.  This has been fully explained to the patient, who indicates understanding.

## 2018-09-21 RX ORDER — TRAMADOL HYDROCHLORIDE 50 MG/1
50 TABLET ORAL EVERY 12 HOURS PRN
Qty: 60 TABLET | Refills: 2 | Status: ON HOLD | OUTPATIENT
Start: 2018-09-21 | End: 2019-03-14 | Stop reason: HOSPADM

## 2018-09-24 ENCOUNTER — LAB VISIT (OUTPATIENT)
Dept: LAB | Facility: HOSPITAL | Age: 78
End: 2018-09-24
Attending: INTERNAL MEDICINE
Payer: MEDICARE

## 2018-09-24 ENCOUNTER — CLINICAL SUPPORT (OUTPATIENT)
Dept: REHABILITATION | Facility: HOSPITAL | Age: 78
End: 2018-09-24
Attending: NURSE PRACTITIONER
Payer: MEDICARE

## 2018-09-24 DIAGNOSIS — N18.5 CHRONIC KIDNEY DISEASE, STAGE V: Primary | ICD-10-CM

## 2018-09-24 DIAGNOSIS — N18.30 CHRONIC KIDNEY DISEASE, STAGE III (MODERATE): ICD-10-CM

## 2018-09-24 DIAGNOSIS — I10 ESSENTIAL HYPERTENSION, MALIGNANT: ICD-10-CM

## 2018-09-24 DIAGNOSIS — M10.9 GOUT, UNSPECIFIED: ICD-10-CM

## 2018-09-24 DIAGNOSIS — M19.90 SENILE ARTHRITIS: ICD-10-CM

## 2018-09-24 DIAGNOSIS — N18.4 CHRONIC KIDNEY DISEASE, STAGE IV (SEVERE): ICD-10-CM

## 2018-09-24 DIAGNOSIS — R42 DIZZINESS: Primary | ICD-10-CM

## 2018-09-24 LAB
ALBUMIN SERPL BCP-MCNC: 3.9 G/DL
ALP SERPL-CCNC: 72 U/L
ALT SERPL W/O P-5'-P-CCNC: 17 U/L
ANION GAP SERPL CALC-SCNC: 13 MMOL/L
AST SERPL-CCNC: 18 U/L
BASOPHILS # BLD AUTO: 0 K/UL
BASOPHILS NFR BLD: 0.4 %
BILIRUB SERPL-MCNC: 0.3 MG/DL
BUN SERPL-MCNC: 67 MG/DL
CALCIUM SERPL-MCNC: 8.7 MG/DL
CHLORIDE SERPL-SCNC: 106 MMOL/L
CO2 SERPL-SCNC: 20 MMOL/L
CREAT SERPL-MCNC: 6.7 MG/DL
DIFFERENTIAL METHOD: ABNORMAL
EOSINOPHIL # BLD AUTO: 0.4 K/UL
EOSINOPHIL NFR BLD: 7.6 %
ERYTHROCYTE [DISTWIDTH] IN BLOOD BY AUTOMATED COUNT: 13.8 %
EST. GFR  (AFRICAN AMERICAN): 8 ML/MIN/1.73 M^2
EST. GFR  (NON AFRICAN AMERICAN): 7 ML/MIN/1.73 M^2
GLUCOSE SERPL-MCNC: 130 MG/DL
HCT VFR BLD AUTO: 31.9 %
HGB BLD-MCNC: 10.6 G/DL
LYMPHOCYTES # BLD AUTO: 1.6 K/UL
LYMPHOCYTES NFR BLD: 32.2 %
MCH RBC QN AUTO: 30 PG
MCHC RBC AUTO-ENTMCNC: 33.2 G/DL
MCV RBC AUTO: 91 FL
MONOCYTES # BLD AUTO: 0.3 K/UL
MONOCYTES NFR BLD: 5.3 %
NEUTROPHILS # BLD AUTO: 2.6 K/UL
NEUTROPHILS NFR BLD: 54.5 %
PHOSPHATE SERPL-MCNC: 4.4 MG/DL
PLATELET # BLD AUTO: 222 K/UL
PMV BLD AUTO: 8 FL
POTASSIUM SERPL-SCNC: 3.5 MMOL/L
PROT SERPL-MCNC: 7.7 G/DL
RBC # BLD AUTO: 3.52 M/UL
SODIUM SERPL-SCNC: 139 MMOL/L
WBC # BLD AUTO: 4.8 K/UL

## 2018-09-24 PROCEDURE — 97110 THERAPEUTIC EXERCISES: CPT | Mod: PN

## 2018-09-24 PROCEDURE — 97530 THERAPEUTIC ACTIVITIES: CPT | Mod: PN

## 2018-09-24 PROCEDURE — 86706 HEP B SURFACE ANTIBODY: CPT

## 2018-09-24 PROCEDURE — 85025 COMPLETE CBC W/AUTO DIFF WBC: CPT | Mod: 91

## 2018-09-24 PROCEDURE — 86704 HEP B CORE ANTIBODY TOTAL: CPT

## 2018-09-24 PROCEDURE — 80053 COMPREHEN METABOLIC PANEL: CPT

## 2018-09-24 PROCEDURE — 84100 ASSAY OF PHOSPHORUS: CPT

## 2018-09-24 PROCEDURE — 87340 HEPATITIS B SURFACE AG IA: CPT

## 2018-09-24 NOTE — PROGRESS NOTES
Name: Micheal Acosta [facility] Number: [id number]  Date of Treatment: 09/24/2018   Diagnosis: Vestibulopathy    Time in: 1600  Time Out: 1645  Total Treatment Time: 45  Group Time: 0      Subjective:    Micheal reports improvement of symptoms.  Patient reports variable intensity of dizzy episodes at random.  Objective    Patient received individual therapy to increase balance and proprioception with  activities as follows:     Micheal received therapeutic exercises to develop  Balance and proprioception for 30 minutes including:   Standing with eyes open and closed  Heel raises x 20  Toe raises x 20  Heel-toe rocking x 20  Knee bends x 20  Hip abduction x 20    Patient participated in dynamic functional therapeutic activities to improve functional performance for 15 minutes. Including: Gait training with head turns, Epley's maneuver on supine, sidelying and sitting head repositioning.      Assessment:     Pt admits to have no more dizziness or vertigo at end of session.    Patient is making good progress towards established goals.      Plan:  Continue with established Plan of Care towards PT goals if symptoms persist..

## 2018-09-25 ENCOUNTER — OFFICE VISIT (OUTPATIENT)
Dept: FAMILY MEDICINE | Facility: CLINIC | Age: 78
End: 2018-09-25
Payer: MEDICARE

## 2018-09-25 ENCOUNTER — TELEPHONE (OUTPATIENT)
Dept: FAMILY MEDICINE | Facility: CLINIC | Age: 78
End: 2018-09-25

## 2018-09-25 VITALS
OXYGEN SATURATION: 97 % | BODY MASS INDEX: 27.49 KG/M2 | HEART RATE: 75 BPM | TEMPERATURE: 98 F | HEIGHT: 73 IN | SYSTOLIC BLOOD PRESSURE: 138 MMHG | WEIGHT: 207.44 LBS | RESPIRATION RATE: 17 BRPM | DIASTOLIC BLOOD PRESSURE: 66 MMHG

## 2018-09-25 DIAGNOSIS — I10 ESSENTIAL HYPERTENSION: ICD-10-CM

## 2018-09-25 DIAGNOSIS — R06.2 WHEEZING: Primary | ICD-10-CM

## 2018-09-25 DIAGNOSIS — R05.9 COUGH: ICD-10-CM

## 2018-09-25 DIAGNOSIS — J45.30 MILD PERSISTENT ASTHMA WITHOUT COMPLICATION: ICD-10-CM

## 2018-09-25 DIAGNOSIS — J45.901 EXACERBATION OF ASTHMA, UNSPECIFIED ASTHMA SEVERITY, UNSPECIFIED WHETHER PERSISTENT: ICD-10-CM

## 2018-09-25 DIAGNOSIS — N18.5 CHRONIC KIDNEY DISEASE, STAGE 5: ICD-10-CM

## 2018-09-25 LAB
BACTERIA SPEC AEROBE CULT: NORMAL
GRAM STN SPEC: NORMAL
GRAM STN SPEC: NORMAL
HBV CORE AB SERPL QL IA: POSITIVE
HBV SURFACE AB SER-ACNC: POSITIVE M[IU]/ML
HBV SURFACE AG SERPL QL IA: NEGATIVE

## 2018-09-25 PROCEDURE — 87205 SMEAR GRAM STAIN: CPT

## 2018-09-25 PROCEDURE — 87070 CULTURE OTHR SPECIMN AEROBIC: CPT

## 2018-09-25 PROCEDURE — 99999 PR PBB SHADOW E&M-EST. PATIENT-LVL V: CPT | Mod: PBBFAC,,, | Performed by: PHYSICIAN ASSISTANT

## 2018-09-25 PROCEDURE — 96372 THER/PROPH/DIAG INJ SC/IM: CPT | Mod: PBBFAC,59,PO

## 2018-09-25 PROCEDURE — 94640 AIRWAY INHALATION TREATMENT: CPT | Mod: PBBFAC,PO

## 2018-09-25 PROCEDURE — 99215 OFFICE O/P EST HI 40 MIN: CPT | Mod: PBBFAC,PO,25 | Performed by: PHYSICIAN ASSISTANT

## 2018-09-25 PROCEDURE — 99214 OFFICE O/P EST MOD 30 MIN: CPT | Mod: S$PBB,,, | Performed by: PHYSICIAN ASSISTANT

## 2018-09-25 RX ORDER — MONTELUKAST SODIUM 10 MG/1
10 TABLET ORAL NIGHTLY
Qty: 30 TABLET | Refills: 0 | Status: SHIPPED | OUTPATIENT
Start: 2018-09-25 | End: 2018-10-10 | Stop reason: SDUPTHER

## 2018-09-25 RX ORDER — IPRATROPIUM BROMIDE AND ALBUTEROL SULFATE 2.5; .5 MG/3ML; MG/3ML
SOLUTION RESPIRATORY (INHALATION)
Qty: 270 ML | Refills: 0 | Status: SHIPPED | OUTPATIENT
Start: 2018-09-25 | End: 2018-11-21 | Stop reason: SDUPTHER

## 2018-09-25 RX ORDER — BETAMETHASONE SODIUM PHOSPHATE AND BETAMETHASONE ACETATE 3; 3 MG/ML; MG/ML
6 INJECTION, SUSPENSION INTRA-ARTICULAR; INTRALESIONAL; INTRAMUSCULAR; SOFT TISSUE
Status: COMPLETED | OUTPATIENT
Start: 2018-09-25 | End: 2018-09-25

## 2018-09-25 RX ORDER — IPRATROPIUM BROMIDE AND ALBUTEROL SULFATE 2.5; .5 MG/3ML; MG/3ML
3 SOLUTION RESPIRATORY (INHALATION)
Status: COMPLETED | OUTPATIENT
Start: 2018-09-25 | End: 2018-09-25

## 2018-09-25 RX ADMIN — BETAMETHASONE ACETATE AND BETAMETHASONE SODIUM PHOSPHATE 6 MG: 3; 3 INJECTION, SUSPENSION INTRA-ARTICULAR; INTRALESIONAL; INTRAMUSCULAR; SOFT TISSUE at 09:09

## 2018-09-25 RX ADMIN — IPRATROPIUM BROMIDE AND ALBUTEROL SULFATE 3 ML: .5; 2.5 SOLUTION RESPIRATORY (INHALATION) at 09:09

## 2018-09-25 NOTE — PROGRESS NOTES
Subjective:       Patient ID: Micheal Hunt is a 77 y.o. male.    Chief Complaint: Cough; Sinus Problem; and Sore Throat    Cough   This is a recurrent problem. The current episode started 1 to 4 weeks ago. The problem has been unchanged. The problem occurs every few minutes. The cough is productive of purulent sputum. Associated symptoms include shortness of breath and wheezing. Pertinent negatives include no chest pain, fever, headaches, heartburn, myalgias, nasal congestion, postnasal drip, rhinorrhea, sweats or weight loss. Nothing aggravates the symptoms. Treatments tried: duoneb tx once daily.  doxycycline. The treatment provided mild relief. His past medical history is significant for asthma and pneumonia.     Review of Systems   Constitutional: Negative for activity change, appetite change, fever and weight loss.   HENT: Negative for postnasal drip, rhinorrhea and sinus pressure.    Eyes: Negative for visual disturbance.   Respiratory: Positive for cough, shortness of breath and wheezing.    Cardiovascular: Negative for chest pain.   Gastrointestinal: Negative for abdominal distention, abdominal pain and heartburn.   Genitourinary: Negative for difficulty urinating and dysuria.   Musculoskeletal: Negative for arthralgias and myalgias.   Neurological: Negative for headaches.   Hematological: Negative for adenopathy.   Psychiatric/Behavioral: The patient is not nervous/anxious.        Objective:      Physical Exam   Constitutional: He is oriented to person, place, and time.   HENT:   Mouth/Throat: Oropharynx is clear and moist. No oropharyngeal exudate.   Eyes: Conjunctivae are normal. Pupils are equal, round, and reactive to light.   Cardiovascular: Normal rate and regular rhythm.   Pulmonary/Chest: Effort normal. He has wheezes.   Abdominal: Soft. Bowel sounds are normal. There is no tenderness.   Musculoskeletal: He exhibits no edema.   Lymphadenopathy:     He has no cervical adenopathy.    Neurological: He is alert and oriented to person, place, and time.   Skin: No erythema.   Psychiatric: His behavior is normal.       Assessment:       1. Wheezing    2. Exacerbation of asthma, unspecified asthma severity, unspecified whether persistent    3. Cough    4. Chronic kidney disease, stage 5    5. Essential hypertension    6. Mild persistent asthma without complication        Plan:       Wheezing  -     albuterol-ipratropium 2.5 mg-0.5 mg/3 mL nebulizer solution 3 mL; Take 3 mLs by nebulization one time.  -     betamethasone acetate-betamethasone sodium phosphate injection 6 mg; Inject 1 mL (6 mg total) into the muscle one time.    Exacerbation of asthma, unspecified asthma severity, unspecified whether persistent  -     albuterol-ipratropium 2.5 mg-0.5 mg/3 mL nebulizer solution 3 mL; Take 3 mLs by nebulization one time.  -     betamethasone acetate-betamethasone sodium phosphate injection 6 mg; Inject 1 mL (6 mg total) into the muscle one time.  Increase duoneb to three times daily  Start singulair  Follow up in 2 weeks or sooner if needed  Complete doxycycline.   Cough  -     albuterol-ipratropium 2.5 mg-0.5 mg/3 mL nebulizer solution 3 mL; Take 3 mLs by nebulization one time.  -     betamethasone acetate-betamethasone sodium phosphate injection 6 mg; Inject 1 mL (6 mg total) into the muscle one time.  -     Culture, Respiratory with Gram Stain    Chronic kidney disease, stage 5  Continue follow up with Ap Rojo  Essential hypertension  Controlled  Low salt diet  Continue current medication  Mild persistent asthma without complication  -     montelukast (SINGULAIR) 10 mg tablet; Take 1 tablet (10 mg total) by mouth every evening.  Dispense: 30 tablet; Refill: 0      Patient readiness: acceptance and barriers:none    During the course of the visit the patient was educated and counseled about the following:     Hypertension:   Medication: no change.  Dietary sodium restriction.  Regular aerobic  exercise.    Goals: Hypertension: Reduce Blood Pressure    Did patient meet goals/outcomes: Yes    The following self management tools provided: declined    Patient Instructions (the written plan) was given to the patient/family.     Time spent with patient: 30 minutes    Barriers to medications present (no )    Adverse reactions to current medications (no)    Over the counter medications reviewed (Yes)

## 2018-09-25 NOTE — TELEPHONE ENCOUNTER
----- Message from Michela Funez sent at 9/25/2018  4:25 PM CDT -----  Contact: Tonya, daughter  Calling to discuss the patient's appt, follow up, and treatment plan. Please call [phone]

## 2018-09-26 ENCOUNTER — HOSPITAL ENCOUNTER (OUTPATIENT)
Dept: RADIOLOGY | Facility: CLINIC | Age: 78
Discharge: HOME | End: 2018-09-26
Attending: PHYSICIAN ASSISTANT
Payer: MEDICARE

## 2018-09-26 ENCOUNTER — NURSE TRIAGE (OUTPATIENT)
Dept: ADMINISTRATIVE | Facility: CLINIC | Age: 78
End: 2018-09-26

## 2018-09-26 ENCOUNTER — TELEPHONE (OUTPATIENT)
Dept: FAMILY MEDICINE | Facility: CLINIC | Age: 78
End: 2018-09-26

## 2018-09-26 DIAGNOSIS — K21.9 GASTROESOPHAGEAL REFLUX DISEASE, ESOPHAGITIS PRESENCE NOT SPECIFIED: Primary | ICD-10-CM

## 2018-09-26 DIAGNOSIS — R05.9 COUGH: Primary | ICD-10-CM

## 2018-09-26 DIAGNOSIS — R05.9 COUGH: ICD-10-CM

## 2018-09-26 PROCEDURE — 71046 X-RAY EXAM CHEST 2 VIEWS: CPT | Mod: 26,,, | Performed by: RADIOLOGY

## 2018-09-26 PROCEDURE — 71046 X-RAY EXAM CHEST 2 VIEWS: CPT | Mod: TC,FY,PO

## 2018-09-26 RX ORDER — OMEPRAZOLE 20 MG/1
20 CAPSULE, DELAYED RELEASE ORAL DAILY
Qty: 30 CAPSULE | Refills: 1 | Status: SHIPPED | OUTPATIENT
Start: 2018-09-26 | End: 2018-10-10 | Stop reason: SDUPTHER

## 2018-09-26 RX ORDER — CEFTRIAXONE 500 MG/1
INJECTION, POWDER, FOR SOLUTION INTRAMUSCULAR; INTRAVENOUS
Status: CANCELLED | OUTPATIENT
Start: 2018-09-26

## 2018-09-26 NOTE — TELEPHONE ENCOUNTER
"Spoke with Mr. Hunt this morning. He was seen yesterday for follow up of URI symptoms. The patient was treated with oral antibiotics, doxycycline. The patient was also advised to restart his breathing treatments 2-3 times daily. He reports his "left lung is burning."  He reports this woke him up at 4am. He tried drinking hot tea and using a breathing treatment. The patient reports this did not help his symptoms. The patient was advised to go to the ED for additional treatment and evaluation. Patient refused ED. I ordered stat cxr and blood work to be completed today. Patient advised not to leave office after CXR so we can review his imaging with him.   "

## 2018-09-26 NOTE — TELEPHONE ENCOUNTER
Reason for Disposition   Nursing judgment    Protocols used: ST NO PROTOCOL CALL: INFORMATION ONLY-A-[place]    Call was transferred to [facility] by scheduling.  Micheal reports he was not calling to speak to a nurse.  Micheal states he wants to speak to Viktoria Chauhan.  States he saw Viktoria yesterday and today his left lung is burning more than before.  Micheal is requesting a call back from Viktoria.  He can be reached on his cell phone.

## 2018-09-27 ENCOUNTER — OFFICE VISIT (OUTPATIENT)
Dept: HEMATOLOGY/ONCOLOGY | Facility: CLINIC | Age: 78
End: 2018-09-27
Payer: MEDICARE

## 2018-09-27 VITALS
TEMPERATURE: 99 F | WEIGHT: 203.94 LBS | DIASTOLIC BLOOD PRESSURE: 98 MMHG | BODY MASS INDEX: 27.03 KG/M2 | HEIGHT: 73 IN | HEART RATE: 77 BPM | SYSTOLIC BLOOD PRESSURE: 166 MMHG | RESPIRATION RATE: 18 BRPM

## 2018-09-27 DIAGNOSIS — D64.9 ERYTHROPOIETIN (EPO) STIMULATING AGENT ANEMIA MANAGEMENT PATIENT: ICD-10-CM

## 2018-09-27 DIAGNOSIS — Z79.899 ERYTHROPOIETIN (EPO) STIMULATING AGENT ANEMIA MANAGEMENT PATIENT: ICD-10-CM

## 2018-09-27 DIAGNOSIS — N18.6 ANEMIA DUE TO END STAGE RENAL DISEASE: Primary | ICD-10-CM

## 2018-09-27 DIAGNOSIS — D63.1 ANEMIA DUE TO END STAGE RENAL DISEASE: Primary | ICD-10-CM

## 2018-09-27 PROCEDURE — 99999 PR PBB SHADOW E&M-EST. PATIENT-LVL V: CPT | Mod: PBBFAC,TXP,, | Performed by: INTERNAL MEDICINE

## 2018-09-27 PROCEDURE — 99215 OFFICE O/P EST HI 40 MIN: CPT | Mod: PBBFAC,PO,TXP | Performed by: INTERNAL MEDICINE

## 2018-09-27 PROCEDURE — 99215 OFFICE O/P EST HI 40 MIN: CPT | Mod: S$PBB,TXP,, | Performed by: INTERNAL MEDICINE

## 2018-09-27 NOTE — PROGRESS NOTES
CC : I feel tired    Micheal Hunt is a 78 y.o.     Pt is here for evaluation of anemia with CKD.  He is tired  His labs reveal continued anemia with a hemoglobin less than 10      he has been experiencing continued  shortness of breath with minimal exertion   Past Medical History:   Diagnosis Date    NICK (acute kidney injury) 7/29/2016    Allergy     Anemia, mild 12/15/2014    Arthritis     Gout    Benign essential HTN 3/27/2012    BMI 29.0-29.9,adult 5/10/2018    BPH (benign prostatic hyperplasia)     CAD (coronary artery disease) 2006    Chronic kidney disease     due to ibuprofen    Colon polyp     Diverticulosis     Gastritis     GERD (gastroesophageal reflux disease)     History of colon polyps 5/3/2018    HTN (hypertension) 3/27/2012    Hyperlipidemia     LLL pneumonia 6/14/2018    LVH (left ventricular hypertrophy)     Mesenteric ischemia     Murmur, cardiac 3/27/2012    GRICELDA (obstructive sleep apnea)     DOES NOT USE A MACHINE    Sinus problem     Syncope and collapse      He is tolerating norvasc for HTN  He is tolerating proscar which is helping with nocturia     Current Outpatient Medications:     albuterol 90 mcg/actuation inhaler, 2 puffs every 4 hours as needed for cough, wheeze, or shortness of breath, Disp: 3 Inhaler, Rfl: 3    albuterol-ipratropium (DUO-NEB) 2.5 mg-0.5 mg/3 mL nebulizer solution, INHALE 1 VIAL BY NEBULIZER EVERY 6 HOURS AS NEEDED FOR WHEEZING, Disp: 270 mL, Rfl: 0    allopurinol (ZYLOPRIM) 100 MG tablet, Take 1 tablet (100 mg total) by mouth once daily., Disp: 90 tablet, Rfl: 1    amLODIPine (NORVASC) 10 MG tablet, Take 1 tablet (10 mg total) by mouth every evening., Disp: 90 tablet, Rfl: 1    aspirin (ECOTRIN) 81 MG EC tablet, Take 81 mg by mouth once daily.  , Disp: , Rfl:     atorvastatin (LIPITOR) 80 MG tablet, TAKE 1 TABLET (80 MG TOTAL) BY MOUTH ONCE DAILY., Disp: 90 tablet, Rfl: 3    COLCRYS 0.6 mg tablet, TAKE 1 TABLET (0.6 MG TOTAL) BY  MOUTH ONCE DAILY., Disp: , Rfl: 11    cyanocobalamin, vitamin B-12, (VITAMIN B-12) 1,000 mcg Subl, Take 1 tablet by mouth daily as needed. Takes 3,000mcg, Disp: , Rfl:     doxycycline (VIBRA-TABS) 100 MG tablet, Take 1 tablet (100 mg total) by mouth 2 (two) times daily. for 10 days, Disp: 20 tablet, Rfl: 0    finasteride (PROSCAR) 5 mg tablet, TAKE 1 TABLET ONCE DAILY., Disp: 90 tablet, Rfl: 3    fluticasone (FLONASE) 50 mcg/actuation nasal spray, 2 sprays (100 mcg total) by Each Nare route once daily. (Patient taking differently: 2 sprays by Each Nare route daily as needed. ), Disp: 3 Bottle, Rfl: 3    gabapentin (NEURONTIN) 100 MG capsule, Take 3 capsules (300 mg total) by mouth every evening., Disp: 270 capsule, Rfl: 4    iron polysaccharides (NIFEREX) 150 mg iron Cap, Take 1 capsule (150 mg total) by mouth 2 (two) times daily before meals., Disp: 60 capsule, Rfl: 11    isosorbide mononitrate (ISMO,MONOKET) 10 mg tablet, Take 1 tablet (10 mg total) by mouth every evening., Disp: 30 tablet, Rfl: 3    meclizine (ANTIVERT) 25 mg tablet, TAKE 1 TABLET (25 MG TOTAL) BY MOUTH 3 (THREE) TIMES DAILY AS NEEDED., Disp: 90 tablet, Rfl: 1    montelukast (SINGULAIR) 10 mg tablet, Take 1 tablet (10 mg total) by mouth every evening., Disp: 30 tablet, Rfl: 0    NITROSTAT 0.4 mg SL tablet, PLACE 1 TABLET (0.4 MG TOTAL) UNDER THE TONGUE EVERY 5 (FIVE) MINUTES AS NEEDED FOR CHEST PAIN., Disp: 25 tablet, Rfl: 3    omeprazole (PRILOSEC) 20 MG capsule, Take 1 capsule (20 mg total) by mouth once daily., Disp: 30 capsule, Rfl: 1    polyethylene glycol (GLYCOLAX) 17 gram/dose powder, Take 17 g by mouth 2 (two) times daily before meals., Disp: 1 Bottle, Rfl: 4    sodium chloride (SALINE NASAL MIST) 3 % Mist, 1 spray by Nasal route 2 (two) times daily., Disp: , Rfl:     traMADol (ULTRAM) 50 mg tablet, Take 1 tablet (50 mg total) by mouth every 12 (twelve) hours as needed for Pain., Disp: 60 tablet, Rfl: 2    zolpidem  "(AMBIEN) 5 MG Tab, TAKE 1 TABLET (5 MG TOTAL) BY MOUTH NIGHTLY AS NEEDED., Disp: 30 tablet, Rfl: 5     He is tolerating Diovan for hypertension continues taking Ambien to help with insomnia    CONSTITUTIONAL: No fevers, chills, night sweats, + fatigue   SKIN: no rashes or itching  ENT: No headaches, head trauma, vision changes, or eye pain  LYMPH NODES: None noticed   CV: No chest pain, palpitations.   RESP:+ dyspnea on exertion,no cough, wheezing, or hemoptysis  GI: No nausea, emesis, diarrhea, constipation, melena, hematochezia, pain.   : No dysuria, hematuria, urgency, or frequency   HEME: No easy bruising, bleeding problems  PSYCHIATRIC: No depression, anxiety, psychosis, hallucinations.  NEURO: No seizures, memory loss, ++dizziness  No difficulty sleeping  MSK: No arthralgias or joint swelling         BP (!) 166/98   Pulse 77   Temp 99.4 °F (37.4 °C)   Resp 18   Ht 6' 1" (1.854 m)   Wt 92.5 kg (203 lb 14.8 oz)   BMI 26.90 kg/m²   Gen: NAD, A and O x3,   Psych: pleasant affect, normal thought process  Eyes: Pupils round and non dilated, EOM intact  Nose: Nares patent  OP clear, mucosa patent  Neck: suppple, no JVD, trachea midline no thyromegaly   Lungs: CTAB,  no use of accessory muscles  CV: S1S2 with RR  ++ MURMUR   Abd: soft, NTND, + BS, No HSM, no ascites  Extr: No CCE, CLARITA  Neuro: steady gait  AV fistula in left forearm   Skin: intact, no lesions noted  Rheum: No joint swelling    Lab Results   Component Value Date    WBC 9.87 09/26/2018    HGB 9.9 (L) 09/26/2018    HCT 29.6 (L) 09/26/2018    MCV 90 09/26/2018     09/26/2018     CMP  Sodium   Date Value Ref Range Status   09/26/2018 138 136 - 145 mmol/L Final     Potassium   Date Value Ref Range Status   09/26/2018 3.7 3.5 - 5.1 mmol/L Final     Chloride   Date Value Ref Range Status   09/26/2018 105 95 - 110 mmol/L Final     CO2   Date Value Ref Range Status   09/26/2018 21 (L) 23 - 29 mmol/L Final     Glucose   Date Value Ref Range Status "   09/26/2018 103 70 - 110 mg/dL Final     BUN, Bld   Date Value Ref Range Status   09/26/2018 64 (H) 8 - 23 mg/dL Final     Creatinine   Date Value Ref Range Status   09/26/2018 5.8 (H) 0.5 - 1.4 mg/dL Final   08/08/2013 1.5 (H) 0.5 - 1.4 mg/dL Final     Calcium   Date Value Ref Range Status   09/26/2018 8.8 8.7 - 10.5 mg/dL Final   08/08/2013 9.0 8.7 - 10.5 mg/dL Final     Total Protein   Date Value Ref Range Status   09/24/2018 7.7 6.0 - 8.4 g/dL Final     Albumin   Date Value Ref Range Status   09/24/2018 3.9 3.5 - 5.2 g/dL Final   09/12/2013 4.3 3.6 - 5.1 g/dL Final     Comment:     @ Test Performed By:  [employer] Paula Wilfredo Baldwin M.D., JACK,   [address] [place] [id number]  [facility] #[id number]     Total Bilirubin   Date Value Ref Range Status   09/24/2018 0.3 0.1 - 1.0 mg/dL Final     Comment:     For infants and newborns, interpretation of results should be based  on gestational age, weight and in agreement with clinical  observations.  Premature Infant recommended reference ranges:  Up to 24 hours.............<8.0 mg/dL  Up to 48 hours............<12.0 mg/dL  3-5 days..................<15.0 mg/dL  6-29 days.................<15.0 mg/dL       Alkaline Phosphatase   Date Value Ref Range Status   09/24/2018 72 55 - 135 U/L Final     AST   Date Value Ref Range Status   09/24/2018 18 10 - 40 U/L Final     ALT   Date Value Ref Range Status   09/24/2018 17 10 - 44 U/L Final     Anion Gap   Date Value Ref Range Status   09/26/2018 12 8 - 16 mmol/L Final   08/08/2013 11 5 - 15 meq/L Final     eGFR if    Date Value Ref Range Status   09/26/2018 9.9 (A) >60 mL/min/1.73 m^2 Final     eGFR if non    Date Value Ref Range Status   09/26/2018 8.6 (A) >60 mL/min/1.73 m^2 Final     Comment:     Calculation used to obtain the estimated glomerular filtration  rate (eGFR) is the CKD-EPI equation.         Ref Range & Units 9d ago   Methlymalonic  Acid <0.40 umol/L 0.53     Comment: If applicable, any drug confirmation testing reported    Haptoglobin decreased yet up to 29  Elevated kappa and llambda yet normal ratio  MARISA Chauhan)   Dx:  Erythropoietin (EPO) stimulating agen...    Ref Range & Units 3d ago   Haptoglobin 30 - 250 mg/dL 93            Contains abnormal data HEPATITIS B SURFACE ANTIBODY   Order: [id number]   Status:  Final result   Visible to patient:  Yes (Patient Portal)   Next appt:  10/10/2018 at 11:20 AM in [facility] (Viktoria Chauhan PA-C)   Dx:  Chronic kidney disease, stage V; Gout...   Component 3d ago   Hep B S Ab Positive Abnormal               Anemia due to end stage renal disease  -     CBC auto differential; Standing    Erythropoietin (EPO) stimulating agent anemia management patient    Other orders  -     Cancel: epoetin kitty injection 20,000 Units; Inject 1 mL (20,000 Units total) into the skin every 7 days.      Not on dialysis   Worsening GFR and CRn : Must see nephrology asap  Proceed with two more does of procrit weekly to help with symptomatic anemia   Continue  B12 1000 mcg SL daily, pt qualifies for procrit as well given his renal function  Haptoglobin reverted to normal yet Hb fell further below 10     Goal of hemoglobin is 11 and above  He is to continue usual medications for hypertension being Norvasc  Continue Neurontin for intermittent paresthesias       Thank you for allowing me to evaluate and participate in the care of this pleasant patient. Please fell free to call me with any questions or concerns.    Mikala Frankel MD    This note was dictated with Dragon and briefly proofread. Please excuse any sentences that may be unclear or words misspelled

## 2018-09-28 ENCOUNTER — TELEPHONE (OUTPATIENT)
Dept: UROLOGY | Facility: CLINIC | Age: 78
End: 2018-09-28

## 2018-09-28 ENCOUNTER — OFFICE VISIT (OUTPATIENT)
Dept: UROLOGY | Facility: CLINIC | Age: 78
End: 2018-09-28
Payer: MEDICARE

## 2018-09-28 ENCOUNTER — APPOINTMENT (OUTPATIENT)
Dept: LAB | Facility: HOSPITAL | Age: 78
End: 2018-09-28
Attending: NURSE PRACTITIONER
Payer: MEDICARE

## 2018-09-28 VITALS
HEIGHT: 73 IN | DIASTOLIC BLOOD PRESSURE: 87 MMHG | SYSTOLIC BLOOD PRESSURE: 176 MMHG | HEART RATE: 80 BPM | WEIGHT: 206.38 LBS | BODY MASS INDEX: 27.35 KG/M2

## 2018-09-28 DIAGNOSIS — R30.0 DYSURIA: Primary | ICD-10-CM

## 2018-09-28 DIAGNOSIS — N28.89 BILATERAL RENAL MASSES: ICD-10-CM

## 2018-09-28 DIAGNOSIS — R35.0 FREQUENCY OF URINATION: ICD-10-CM

## 2018-09-28 LAB
BILIRUB SERPL-MCNC: ABNORMAL MG/DL
BLOOD URINE, POC: 250
COLOR, POC UA: YELLOW
GLUCOSE UR QL STRIP: ABNORMAL
KETONES UR QL STRIP: ABNORMAL
LEUKOCYTE ESTERASE URINE, POC: ABNORMAL
NITRITE, POC UA: ABNORMAL
PH, POC UA: 5
POC RESIDUAL URINE VOLUME: 13 ML (ref 0–100)
PROTEIN, POC: ABNORMAL
SPECIFIC GRAVITY, POC UA: 1.01
UROBILINOGEN, POC UA: ABNORMAL

## 2018-09-28 PROCEDURE — 51798 US URINE CAPACITY MEASURE: CPT | Mod: PBBFAC,PN,NTX | Performed by: NURSE PRACTITIONER

## 2018-09-28 PROCEDURE — 81002 URINALYSIS NONAUTO W/O SCOPE: CPT | Mod: PBBFAC,PN,NTX | Performed by: NURSE PRACTITIONER

## 2018-09-28 PROCEDURE — 99214 OFFICE O/P EST MOD 30 MIN: CPT | Mod: S$PBB,TXP,, | Performed by: NURSE PRACTITIONER

## 2018-09-28 PROCEDURE — 87086 URINE CULTURE/COLONY COUNT: CPT | Mod: TXP

## 2018-09-28 PROCEDURE — 99999 PR PBB SHADOW E&M-EST. PATIENT-LVL V: CPT | Mod: PBBFAC,TXP,, | Performed by: NURSE PRACTITIONER

## 2018-09-28 PROCEDURE — 51798 US URINE CAPACITY MEASURE: CPT | Mod: PBBFAC,PN,TXP | Performed by: NURSE PRACTITIONER

## 2018-09-28 PROCEDURE — 99215 OFFICE O/P EST HI 40 MIN: CPT | Mod: PBBFAC,PN,TXP | Performed by: NURSE PRACTITIONER

## 2018-09-28 PROCEDURE — 88112 CYTOPATH CELL ENHANCE TECH: CPT | Performed by: PATHOLOGY

## 2018-09-28 RX ORDER — CIPROFLOXACIN 500 MG/1
500 TABLET ORAL 2 TIMES DAILY
Qty: 28 TABLET | Refills: 0 | Status: SHIPPED | OUTPATIENT
Start: 2018-09-28 | End: 2018-10-12

## 2018-09-28 NOTE — PROGRESS NOTES
[facility] Note  Staff: HOPE BuenrostroC    PCP: Dr. Pk Lakhani    Chief Complaint: Dysuria, urinary frequency    Subjective:        HPI: Micheal Hunt is a 78 y.o. male presents today with c/o dysuria and urinary frequency symptoms.    On 08/29/18 pt arrived into clinic for appt with Dr. Payne to get established with Urologist.  BP was 85/54, Pulse 47.  Pt was sent over to emergency room and appt was cancelled.    Pt was last seen by Dr. Herring on 04/27/2018 for hx of urinary frequency with lower urinary tract symptoms.    Pt saw Dr. Mikala Wells yesterday to followup from hx of Anemia issues.  Pt recently discharged from the hospital after being found to have pneumonia  He is tired  His labs reveal continued anemia with a hemoglobin less than 10  He has  documented renal disease for which she sees nephrology regularly., DR Rojo    His daughter reports that he is extremely fatigued and the patient admits he has been experiencing continued  shortness of breath with minimal exertion he is no longer taking any nonsteroidal anti-inflammatory's    Recent CT scan done on 08/17/2018 which showed the following:  IMPRESSION:  Numerous bilateral indeterminate renal lesions.  Further evaluation with   renal ultrasound is recommended.  Severe atherosclerosis.  Moderate diverticulosis coli.  Moderate prostatomegaly.  Mild right lower lobe ground-glass infiltrate, likely infectious in   nature.  Resolved left lower lobe infiltrate.  *Required a Renal US which was not done as of today's office visit.    Last PSA Screening:   Lab Results   Component Value Date    PSA 5.6 (H) 07/26/2018    PSA 4.01 (H) 06/03/2013    PSA 1.57 02/16/2012    PSADIAG 4.4 (H) 08/18/2017    PSADIAG 5.0 (H) 07/07/2015    PSADIAG 8.6 (H) 06/18/2015     REVIEW OF SYSTEMS:  Review of Systems   Constitutional: Negative for chills, diaphoresis, fever and weight loss.   HENT: Negative for congestion, hearing loss,  nosebleeds and sore throat.    Eyes: Negative for blurred vision and pain.   Respiratory: Negative for cough and wheezing.         Recent pneumonia    Cardiovascular: Negative for chest pain, palpitations and leg swelling.   Gastrointestinal: Negative for abdominal pain, heartburn, nausea and vomiting.   Genitourinary: Positive for dysuria, frequency and urgency. Negative for flank pain and hematuria.        CKD, stage 5   Musculoskeletal: Negative for back pain, joint pain, myalgias and neck pain.   Skin: Negative for itching and rash.   Neurological: Negative for dizziness, tremors, sensory change, seizures, loss of consciousness, weakness and headaches.   Endo/Heme/Allergies: Does not bruise/bleed easily.        +Anemia   Psychiatric/Behavioral: Negative for depression and suicidal ideas. The patient is not nervous/anxious.      Physical Exam    PMHx:  Past Medical History:   Diagnosis Date    NICK (acute kidney injury) 7/29/2016    Allergy     Anemia, mild 12/15/2014    Arthritis     Gout    Benign essential HTN 3/27/2012    BMI 29.0-29.9,adult 5/10/2018    BPH (benign prostatic hyperplasia)     CAD (coronary artery disease) 2006    Chronic kidney disease     due to ibuprofen    Colon polyp     Diverticulosis     Gastritis     GERD (gastroesophageal reflux disease)     History of colon polyps 5/3/2018    HTN (hypertension) 3/27/2012    Hyperlipidemia     LLL pneumonia 6/14/2018    LVH (left ventricular hypertrophy)     Mesenteric ischemia     Murmur, cardiac 3/27/2012    GRICELDA (obstructive sleep apnea)     DOES NOT USE A MACHINE    Sinus problem     Syncope and collapse      PSHx:  Past Surgical History:   Procedure Laterality Date    APPENDECTOMY      BLOCK-NERVE Left 5/31/2016    [facility] at [facility]    CARDIAC CATHETERIZATION      COLONOSCOPY  2011    COLONOSCOPY N/A 5/3/2018    Procedure: COLONOSCOPY;  Surgeon: Messi Harris MD;  Location: [facility];  Service:  Endoscopy;  Laterality: N/A;    COLONOSCOPY N/A 5/3/2018    [facility] at [facility]O    COLONOSCOPY N/A 9/10/2015    [facility] at [facility]    CORONARY ARTERY BYPASS GRAFT  4/2007    x 1    CYSTOSCOPY N/A 8/30/2017    [facility] at [facility] OR    CYSTOSCOPY N/A 11/10/2015    [facility] at [facility] OR    ESOPHAGOGASTRODUODENOSCOPY (EGD) N/A 1/4/2016    Performed by Tra [facility] at [facility] (2ND F[facility])    ESOPHAGOGASTRODUODENOSCOPY (EGD) N/A 10/15/2014    Per[facility] by Mess[facility] Harris [facility] at [facility] ENDO    INJECTION-STEROID-EPIDURAL-TRANSFORAMINAL Left 2/25/2016    [facility] at [facility] OR    JOINT REPLACEMENT      left knee total replacement  X 3    mid leftt finger      from a cactuss    MOLE REMOVAL  2016    RADIOFREQUENCY THERMOCOAGULATION (RFTC)-NERVE-MEDIAN BRANCH-LUMBAR Left 4/11/2016    Per[facility] at [facility] OR    rotative cuff      no rotative cuffs on bilat shoulders has pins     SHOULDER SURGERY      shoulder surgery bilat  RIGHT X 4; LEFT X 3    TRANSRECTAL ULTRASOUND GUIDED PROSTATE BIOPSY Bilateral 11/10/2015    [facility] at [facility] OR    ULTRASOUND-ENDOSCOPIC-UPPER N/A 5/31/2017    Performed by Tra [facility] at [facility] ENDO (2ND FLR)    ULTRASOUND-ENDOSCOPIC-UPPER N/A 1/4/2016    Performed by Tra Brooks [facility] at [facility] ENDO (2ND FLR)    ULTRASOUND-ENDOSCOPIC-UPPER N/A 1/16/2015    Performed by Jason Saleem MD at [facility] ENDO (2ND FLR)     Allergies:  Ace inhibitors; Arb-angiotensin receptor antagonist; Eplerenone; and Sulfa (sulfonamide antibiotics)    Medications: reviewed   Anticoagulation: Ecotrin 81 mg one tablet daily    Objective:     Vitals:    09/28/18 1038   BP: (!) 176/87   Pulse: 80     General:WDWN in NAD  Eyes: PERRLA, normal conjunctiva  Respiratory: no increased work on breathing, clear to auscultation  Cardiovascular: regular rate  and rhythm. No obvious extremity edema.  GI: palpation of masses. No tenderness. No hepatosplenomegaly to palpation.  Musculoskeletal: normal range of motion of bilateral upper extremities. Normal muscle strength and tone.  Skin: no obvious rashes or lesions. No tightening of skin noted.  Neurologic: CN grossly normal. Normal sensation.   Psychiatric: awake, alert and oriented x 3. Mood and affect normal. Cooperative.    :  PT DEFERRED EXAM TODAY.  PVR by bladder scan performed by BAYRON Nicole LPN today in office: 13 mL    LABS REVIEW:  UA today: ABNORMAL  Cr:   Lab Results   Component Value Date    CREATININE 5.8 (H) 09/26/2018     Assessment:       1. Dysuria    2. Frequency of urination    3. Bilateral renal masses          Plan:   Dysuria, urinary frequency, urgency:    Stop Doxycycline.  Cipro 500 mg 1 po BID prescribed to pt to see if this will improve his LUTS.  Renal US to be performed to further evaluate bilateral renal lesions.    F/u Pt already has an appt. With Dr. Payne for further evaluation.    [facility]: Active    Barb Johnson, ROSALBA-C

## 2018-09-28 NOTE — TELEPHONE ENCOUNTER
----- Message from Lety Aguilar sent at 9/28/2018  7:04 AM CDT -----  Type:  Same Day Appointment Request    Caller is requesting a same day appointment.  Caller declined first available appointment listed below.      Name of Caller:  pt  When is the first available appointment?  Next  Month  Wanted a  Dr  Only   Checked  Other  Dr  scheduled   Symptoms: urinating , burning  Best Call Back Number: [phone]    Wants to  Be  Fitted in today  No  NP per pt

## 2018-09-28 NOTE — TELEPHONE ENCOUNTER
Spoke with patient, informed there is no availability with the MD today and if needs some treatment, he can come in today and see the NP, patient agreed and appt made, patient verbally understood.

## 2018-09-29 ENCOUNTER — HOSPITAL ENCOUNTER (OUTPATIENT)
Dept: RADIOLOGY | Facility: HOSPITAL | Age: 78
Discharge: HOME | End: 2018-09-29
Attending: NURSE PRACTITIONER
Payer: MEDICARE

## 2018-09-29 DIAGNOSIS — R35.0 FREQUENCY OF URINATION: ICD-10-CM

## 2018-09-29 DIAGNOSIS — N28.89 BILATERAL RENAL MASSES: ICD-10-CM

## 2018-09-29 DIAGNOSIS — R30.0 DYSURIA: ICD-10-CM

## 2018-09-29 LAB — BACTERIA UR CULT: NO GROWTH

## 2018-09-29 PROCEDURE — 76770 US EXAM ABDO BACK WALL COMP: CPT | Mod: TC,TXP

## 2018-09-29 PROCEDURE — 76770 US EXAM ABDO BACK WALL COMP: CPT | Mod: 26,TXP,, | Performed by: RADIOLOGY

## 2018-09-30 ENCOUNTER — EXTERNAL CHRONIC CARE MANAGEMENT (OUTPATIENT)
Dept: PRIMARY CARE CLINIC | Facility: CLINIC | Age: 78
End: 2018-09-30
Payer: MEDICARE

## 2018-09-30 PROBLEM — Z79.899 ERYTHROPOIETIN (EPO) STIMULATING AGENT ANEMIA MANAGEMENT PATIENT: Status: ACTIVE | Noted: 2018-09-30

## 2018-09-30 PROBLEM — D64.9 ERYTHROPOIETIN (EPO) STIMULATING AGENT ANEMIA MANAGEMENT PATIENT: Status: ACTIVE | Noted: 2018-09-30

## 2018-09-30 PROCEDURE — 99490 CHRNC CARE MGMT STAFF 1ST 20: CPT | Mod: PBBFAC,PO | Performed by: FAMILY MEDICINE

## 2018-09-30 PROCEDURE — 99490 CHRNC CARE MGMT STAFF 1ST 20: CPT | Mod: S$PBB,,, | Performed by: FAMILY MEDICINE

## 2018-10-10 ENCOUNTER — OFFICE VISIT (OUTPATIENT)
Dept: FAMILY MEDICINE | Facility: CLINIC | Age: 78
End: 2018-10-10
Payer: MEDICARE

## 2018-10-10 ENCOUNTER — LAB VISIT (OUTPATIENT)
Dept: LAB | Facility: HOSPITAL | Age: 78
End: 2018-10-10
Attending: INTERNAL MEDICINE
Payer: MEDICARE

## 2018-10-10 VITALS
WEIGHT: 213.19 LBS | DIASTOLIC BLOOD PRESSURE: 66 MMHG | HEART RATE: 73 BPM | TEMPERATURE: 99 F | OXYGEN SATURATION: 97 % | BODY MASS INDEX: 28.25 KG/M2 | HEIGHT: 73 IN | SYSTOLIC BLOOD PRESSURE: 140 MMHG | RESPIRATION RATE: 17 BRPM

## 2018-10-10 DIAGNOSIS — K21.9 GASTROESOPHAGEAL REFLUX DISEASE, ESOPHAGITIS PRESENCE NOT SPECIFIED: ICD-10-CM

## 2018-10-10 DIAGNOSIS — J45.30 MILD PERSISTENT ASTHMA WITHOUT COMPLICATION: ICD-10-CM

## 2018-10-10 DIAGNOSIS — M25.532 LEFT WRIST PAIN: Primary | ICD-10-CM

## 2018-10-10 DIAGNOSIS — N18.6 ANEMIA DUE TO END STAGE RENAL DISEASE: ICD-10-CM

## 2018-10-10 DIAGNOSIS — D63.1 ANEMIA DUE TO END STAGE RENAL DISEASE: ICD-10-CM

## 2018-10-10 LAB
BASOPHILS # BLD AUTO: 0.03 K/UL
BASOPHILS NFR BLD: 0.5 %
DIFFERENTIAL METHOD: ABNORMAL
EOSINOPHIL # BLD AUTO: 0.4 K/UL
EOSINOPHIL NFR BLD: 5.5 %
ERYTHROCYTE [DISTWIDTH] IN BLOOD BY AUTOMATED COUNT: 15.1 %
HCT VFR BLD AUTO: 31.4 %
HGB BLD-MCNC: 9.9 G/DL
IMM GRANULOCYTES # BLD AUTO: 0.01 K/UL
IMM GRANULOCYTES NFR BLD AUTO: 0.2 %
LYMPHOCYTES # BLD AUTO: 1.6 K/UL
LYMPHOCYTES NFR BLD: 25 %
MCH RBC QN AUTO: 29.9 PG
MCHC RBC AUTO-ENTMCNC: 31.5 G/DL
MCV RBC AUTO: 95 FL
MONOCYTES # BLD AUTO: 0.5 K/UL
MONOCYTES NFR BLD: 7.8 %
NEUTROPHILS # BLD AUTO: 3.9 K/UL
NEUTROPHILS NFR BLD: 61 %
NRBC BLD-RTO: 0 /100 WBC
PLATELET # BLD AUTO: 157 K/UL
PMV BLD AUTO: 10.6 FL
RBC # BLD AUTO: 3.31 M/UL
WBC # BLD AUTO: 6.41 K/UL

## 2018-10-10 PROCEDURE — 99999 PR PBB SHADOW E&M-EST. PATIENT-LVL V: CPT | Mod: PBBFAC,,, | Performed by: PHYSICIAN ASSISTANT

## 2018-10-10 PROCEDURE — 99215 OFFICE O/P EST HI 40 MIN: CPT | Mod: PBBFAC,PO | Performed by: PHYSICIAN ASSISTANT

## 2018-10-10 PROCEDURE — 85025 COMPLETE CBC W/AUTO DIFF WBC: CPT

## 2018-10-10 PROCEDURE — 36415 COLL VENOUS BLD VENIPUNCTURE: CPT | Mod: PO

## 2018-10-10 PROCEDURE — 99213 OFFICE O/P EST LOW 20 MIN: CPT | Mod: S$PBB,,, | Performed by: PHYSICIAN ASSISTANT

## 2018-10-10 RX ORDER — GABAPENTIN 100 MG/1
100 CAPSULE ORAL NIGHTLY
Qty: 90 CAPSULE | Refills: 1 | Status: SHIPPED | OUTPATIENT
Start: 2018-10-10 | End: 2018-10-19

## 2018-10-10 RX ORDER — MONTELUKAST SODIUM 10 MG/1
10 TABLET ORAL NIGHTLY
Qty: 90 TABLET | Refills: 1 | Status: SHIPPED | OUTPATIENT
Start: 2018-10-10 | End: 2018-11-09

## 2018-10-10 RX ORDER — OMEPRAZOLE 20 MG/1
20 CAPSULE, DELAYED RELEASE ORAL DAILY
Qty: 90 CAPSULE | Refills: 1 | Status: SHIPPED | OUTPATIENT
Start: 2018-10-10 | End: 2019-01-29 | Stop reason: SDUPTHER

## 2018-10-10 NOTE — PROGRESS NOTES
Subjective:       Patient ID: Micheal Hunt is a 78 y.o. male.    Chief Complaint: Follow-up    Mr. Hunt comes to clinic today for follow up. His asthma and breathing symptoms have improved with singulair and breathing treatments. His GERD symptoms have also resolved with the omeprazole. The patient reports he is feeling much better. He does complain of left wrist pain. This is chronic. He was advised to have carpal tunnel surgery at one time but he decided not to proceed with this and has decided to use a brace. The brace does help the pain but he wishes to see Dr. Coburn for further evaluation.       Review of Systems   Constitutional: Negative for activity change, appetite change and fever.   HENT: Negative for postnasal drip, rhinorrhea and sinus pressure.    Eyes: Negative for visual disturbance.   Respiratory: Negative for cough, shortness of breath and wheezing.    Cardiovascular: Negative for chest pain.   Gastrointestinal: Negative for abdominal distention and abdominal pain.   Genitourinary: Negative for difficulty urinating and dysuria.   Musculoskeletal: Positive for arthralgias. Negative for myalgias.   Neurological: Negative for headaches.   Hematological: Negative for adenopathy.   Psychiatric/Behavioral: The patient is not nervous/anxious.        Objective:      Physical Exam   Constitutional: He is oriented to person, place, and time.   HENT:   Mouth/Throat: Oropharynx is clear and moist. No oropharyngeal exudate.   Eyes: Conjunctivae are normal. Pupils are equal, round, and reactive to light.   Cardiovascular: Normal rate and regular rhythm.   Pulmonary/Chest: Effort normal. He has no wheezes.   Wheezing has resolved  Lungs CTAB   Abdominal: Soft. Bowel sounds are normal. There is no tenderness.   Musculoskeletal: He exhibits no edema.   Brace present on the left wrist.    Lymphadenopathy:     He has no cervical adenopathy.   Neurological: He is alert and oriented to person, place, and  time.   Skin: No erythema.   Psychiatric: His behavior is normal.       Assessment:       1. Left wrist pain    2. Mild persistent asthma without complication    3. Gastroesophageal reflux disease, esophagitis presence not specified        Plan:   Micheal was seen today for follow-up.    Diagnoses and all orders for this visit:    Left wrist pain  -     Ambulatory referral to Physical Medicine Rehab    Mild persistent asthma without complication  -     montelukast (SINGULAIR) 10 mg tablet; Take 1 tablet (10 mg total) by mouth every evening.  Continue breathing treatments  Gastroesophageal reflux disease, esophagitis presence not specified  -     omeprazole (PRILOSEC) 20 MG capsule; Take 1 capsule (20 mg total) by mouth once daily.  Avoid trigger foods  Stop eating 2-3 hours before bed  Other orders  -     gabapentin (NEURONTIN) 100 MG capsule; Take 1 capsule (100 mg total) by mouth every evening.

## 2018-10-11 ENCOUNTER — HOSPITAL ENCOUNTER (OUTPATIENT)
Dept: RADIOLOGY | Facility: HOSPITAL | Age: 78
Discharge: HOME | End: 2018-10-11
Attending: INTERNAL MEDICINE
Payer: MEDICARE

## 2018-10-11 ENCOUNTER — OFFICE VISIT (OUTPATIENT)
Dept: HEMATOLOGY/ONCOLOGY | Facility: CLINIC | Age: 78
End: 2018-10-11
Payer: MEDICARE

## 2018-10-11 VITALS
TEMPERATURE: 98 F | WEIGHT: 213.88 LBS | DIASTOLIC BLOOD PRESSURE: 65 MMHG | HEART RATE: 77 BPM | SYSTOLIC BLOOD PRESSURE: 138 MMHG | HEIGHT: 73 IN | RESPIRATION RATE: 16 BRPM | BODY MASS INDEX: 28.34 KG/M2

## 2018-10-11 DIAGNOSIS — R42 DIZZINESS: ICD-10-CM

## 2018-10-11 DIAGNOSIS — Z79.899 ERYTHROPOIETIN (EPO) STIMULATING AGENT ANEMIA MANAGEMENT PATIENT: Primary | ICD-10-CM

## 2018-10-11 DIAGNOSIS — M25.532 ACUTE PAIN OF LEFT WRIST: ICD-10-CM

## 2018-10-11 DIAGNOSIS — M77.9 TENDINITIS: ICD-10-CM

## 2018-10-11 DIAGNOSIS — N18.5 CHRONIC KIDNEY DISEASE, STAGE 5: ICD-10-CM

## 2018-10-11 DIAGNOSIS — D63.8 ANEMIA OF CHRONIC DISEASE: ICD-10-CM

## 2018-10-11 DIAGNOSIS — D64.9 ERYTHROPOIETIN (EPO) STIMULATING AGENT ANEMIA MANAGEMENT PATIENT: Primary | ICD-10-CM

## 2018-10-11 DIAGNOSIS — N25.81 SECONDARY HYPERPARATHYROIDISM: ICD-10-CM

## 2018-10-11 PROBLEM — I50.812 CHRONIC RIGHT-SIDED HEART FAILURE: Status: RESOLVED | Noted: 2018-07-05 | Resolved: 2018-10-11

## 2018-10-11 PROCEDURE — 99215 OFFICE O/P EST HI 40 MIN: CPT | Mod: S$PBB,,, | Performed by: INTERNAL MEDICINE

## 2018-10-11 PROCEDURE — 99213 OFFICE O/P EST LOW 20 MIN: CPT | Mod: PBBFAC,PO,25 | Performed by: INTERNAL MEDICINE

## 2018-10-11 PROCEDURE — 99999 PR PBB SHADOW E&M-EST. PATIENT-LVL III: CPT | Mod: PBBFAC,,, | Performed by: INTERNAL MEDICINE

## 2018-10-11 PROCEDURE — 73110 X-RAY EXAM OF WRIST: CPT | Mod: TC,FY,LT

## 2018-10-11 PROCEDURE — 73110 X-RAY EXAM OF WRIST: CPT | Mod: 26,LT,, | Performed by: RADIOLOGY

## 2018-10-11 RX ORDER — CYCLOBENZAPRINE HCL 5 MG
TABLET ORAL
Qty: 30 TABLET | Refills: 0 | Status: SHIPPED | OUTPATIENT
Start: 2018-10-11 | End: 2018-11-21

## 2018-10-11 NOTE — PROGRESS NOTES
CC : I still feel tired    My left wrist is hurting more   Micheal Hunt is a 78 y.o.     Pt is here for evaluation of anemia with CKD.  He is tired  His labs reveal continued anemia with a hemoglobin less than 10      he has been experiencing continual shortness of breath with minimal exertion   He has received 20,000 u of procrit in past and is here to check his Hb  Counts have not improved   Left wrist with acute pain, wakes him up at night , no pain relief with cream or ultram     Past Medical History:   Diagnosis Date    NICK (acute kidney injury) 7/29/2016    Allergy     Anemia, mild 12/15/2014    Arthritis     Gout    Benign essential HTN 3/27/2012    BMI 29.0-29.9,adult 5/10/2018    BPH (benign prostatic hyperplasia)     CAD (coronary artery disease) 2006    Chronic kidney disease     due to ibuprofen    Colon polyp     Diverticulosis     Gastritis     GERD (gastroesophageal reflux disease)     History of colon polyps 5/3/2018    HTN (hypertension) 3/27/2012    Hyperlipidemia     LLL pneumonia 6/14/2018    LVH (left ventricular hypertrophy)     Mesenteric ischemia     Murmur, cardiac 3/27/2012    GRICELDA (obstructive sleep apnea)     DOES NOT USE A MACHINE    Sinus problem     Syncope and collapse      He is tolerating norvasc for HTN  He is tolerating proscar which is helping with nocturia     Current Outpatient Medications:     albuterol 90 mcg/actuation inhaler, 2 puffs every 4 hours as needed for cough, wheeze, or shortness of breath, Disp: 3 Inhaler, Rfl: 3    albuterol-ipratropium (DUO-NEB) 2.5 mg-0.5 mg/3 mL nebulizer solution, INHALE 1 VIAL BY NEBULIZER EVERY 6 HOURS AS NEEDED FOR WHEEZING, Disp: 270 mL, Rfl: 0    allopurinol (ZYLOPRIM) 100 MG tablet, Take 1 tablet (100 mg total) by mouth once daily., Disp: 90 tablet, Rfl: 1    amLODIPine (NORVASC) 10 MG tablet, Take 1 tablet (10 mg total) by mouth every evening., Disp: 90 tablet, Rfl: 1    aspirin (ECOTRIN) 81 MG EC  tablet, Take 81 mg by mouth once daily.  , Disp: , Rfl:     atorvastatin (LIPITOR) 80 MG tablet, TAKE 1 TABLET (80 MG TOTAL) BY MOUTH ONCE DAILY., Disp: 90 tablet, Rfl: 3    ciprofloxacin HCl (CIPRO) 500 MG tablet, Take 1 tablet (500 mg total) by mouth 2 (two) times daily. for 14 days, Disp: 28 tablet, Rfl: 0    COLCRYS 0.6 mg tablet, TAKE 1 TABLET (0.6 MG TOTAL) BY MOUTH ONCE DAILY., Disp: , Rfl: 11    cyanocobalamin, vitamin B-12, (VITAMIN B-12) 1,000 mcg Subl, Take 1 tablet by mouth daily as needed. Takes 3,000mcg, Disp: , Rfl:     finasteride (PROSCAR) 5 mg tablet, TAKE 1 TABLET ONCE DAILY., Disp: 90 tablet, Rfl: 3    fluticasone (FLONASE) 50 mcg/actuation nasal spray, 2 sprays (100 mcg total) by Each Nare route once daily. (Patient taking differently: 2 sprays by Each Nare route daily as needed. ), Disp: 3 Bottle, Rfl: 3    gabapentin (NEURONTIN) 100 MG capsule, Take 1 capsule (100 mg total) by mouth every evening., Disp: 90 capsule, Rfl: 1    iron polysaccharides (NIFEREX) 150 mg iron Cap, Take 1 capsule (150 mg total) by mouth 2 (two) times daily before meals., Disp: 60 capsule, Rfl: 11    isosorbide mononitrate (ISMO,MONOKET) 10 mg tablet, Take 1 tablet (10 mg total) by mouth every evening., Disp: 30 tablet, Rfl: 3    meclizine (ANTIVERT) 25 mg tablet, TAKE 1 TABLET (25 MG TOTAL) BY MOUTH 3 (THREE) TIMES DAILY AS NEEDED., Disp: 90 tablet, Rfl: 1    montelukast (SINGULAIR) 10 mg tablet, Take 1 tablet (10 mg total) by mouth every evening., Disp: 90 tablet, Rfl: 1    NITROSTAT 0.4 mg SL tablet, PLACE 1 TABLET (0.4 MG TOTAL) UNDER THE TONGUE EVERY 5 (FIVE) MINUTES AS NEEDED FOR CHEST PAIN., Disp: 25 tablet, Rfl: 3    omeprazole (PRILOSEC) 20 MG capsule, Take 1 capsule (20 mg total) by mouth once daily., Disp: 90 capsule, Rfl: 1    polyethylene glycol (GLYCOLAX) 17 gram/dose powder, Take 17 g by mouth 2 (two) times daily before meals., Disp: 1 Bottle, Rfl: 4    sodium chloride (SALINE NASAL MIST) 3  "% Mist, 1 spray by Nasal route 2 (two) times daily., Disp: , Rfl:     traMADol (ULTRAM) 50 mg tablet, Take 1 tablet (50 mg total) by mouth every 12 (twelve) hours as needed for Pain., Disp: 60 tablet, Rfl: 2    zolpidem (AMBIEN) 5 MG Tab, TAKE 1 TABLET (5 MG TOTAL) BY MOUTH NIGHTLY AS NEEDED., Disp: 30 tablet, Rfl: 5     He is tolerating Diovan for hypertension continues taking Ambien to help with insomnia    CONSTITUTIONAL: No fevers, chills, night sweats, + fatigue   SKIN: no rashes or itching  ENT: No headaches, head trauma, vision changes, or eye pain  LYMPH NODES: None noticed   CV: No chest pain, palpitations.   RESP:+ dyspnea on exertion,no cough, wheezing, or hemoptysis  GI: No nausea, emesis, diarrhea, constipation, melena, hematochezia, pain.   : No dysuria, hematuria, urgency, or frequency   HEME: No easy bruising, bleeding problems  PSYCHIATRIC: No depression, anxiety, psychosis, hallucinations.  NEURO: No seizures, memory loss, Intermittent dizziness  No difficulty sleeping  MSK: No arthralgias or joint swelling         /65   Pulse 77   Temp 98.4 °F (36.9 °C)   Resp 16   Ht 6' 1" (1.854 m)   Wt 97 kg (213 lb 13.5 oz)   BMI 28.21 kg/m²     Constitutional: oriented to person, place, and time.  well-developed and well-nourished.   HENT:   Head: Normocephalic and atraumatic.   Right Ear: External ear normal.   Left Ear: External ear normal.   Nose: Nose normal.   Mouth/Throat: Oropharynx is clear and moist.   Eyes: Conjunctivae and EOM are normal. Pupils are equal, round, and reactive to light.   Neck: Normal range of motion. Neck supple. No thyromegaly present.   Cardiovascular: Normal rate, regular rhythm, normal heart sounds and intact distal pulses.    ++ murmur heard.  Pulmonary/Chest: Effort normal and breath sounds normal.  no wheezes.  no rales.   Abdominal: Soft. Bowel sounds are normal.  no mass. There is no tenderness. There is no rebound.   Musculoskeletal:   no edema  + limited " ROM and swelling of left wrist tenderness.   Lymphadenopathy:    no cervical adenopathy.   Neurological: alert and oriented to person, place, and time. normal reflexes. No cranial nerve deficit.   Skin: Skin is warm and dry. No rash noted.   Psychiatric: normal mood and affect.   behavior is normal.   Vitals reviewed.    Lab Results   Component Value Date    WBC 6.41 10/10/2018    HGB 9.9 (L) 10/10/2018    HCT 31.4 (L) 10/10/2018    MCV 95 10/10/2018     10/10/2018     CMP  Sodium   Date Value Ref Range Status   09/26/2018 138 136 - 145 mmol/L Final     Potassium   Date Value Ref Range Status   09/26/2018 3.7 3.5 - 5.1 mmol/L Final     Chloride   Date Value Ref Range Status   09/26/2018 105 95 - 110 mmol/L Final     CO2   Date Value Ref Range Status   09/26/2018 21 (L) 23 - 29 mmol/L Final     Glucose   Date Value Ref Range Status   09/26/2018 103 70 - 110 mg/dL Final     BUN, Bld   Date Value Ref Range Status   09/26/2018 64 (H) 8 - 23 mg/dL Final     Creatinine   Date Value Ref Range Status   09/26/2018 5.8 (H) 0.5 - 1.4 mg/dL Final   08/08/2013 1.5 (H) 0.5 - 1.4 mg/dL Final     Calcium   Date Value Ref Range Status   09/26/2018 8.8 8.7 - 10.5 mg/dL Final   08/08/2013 9.0 8.7 - 10.5 mg/dL Final     Total Protein   Date Value Ref Range Status   09/24/2018 7.7 6.0 - 8.4 g/dL Final     Albumin   Date Value Ref Range Status   09/24/2018 3.9 3.5 - 5.2 g/dL Final   09/12/2013 4.3 3.6 - 5.1 g/dL Final     Comment:     @ Test Performed By:  [facility] Contreras M.D.,   [id number] [place] [id number]  IA #[id number]     Total Bilirubin   Date Value Ref Range Status   09/24/2018 0.3 0.1 - 1.0 mg/dL Final     Comment:     For infants and newborns, interpretation of results should be based  on gestational age, weight and in agreement with clinical  observations.  Premature Infant recommended reference ranges:  Up to 24 hours.............<8.0  mg/dL  Up to 48 hours............<12.0 mg/dL  3-5 days..................<15.0 mg/dL  6-29 days.................<15.0 mg/dL       Alkaline Phosphatase   Date Value Ref Range Status   09/24/2018 72 55 - 135 U/L Final     AST   Date Value Ref Range Status   09/24/2018 18 10 - 40 U/L Final     ALT   Date Value Ref Range Status   09/24/2018 17 10 - 44 U/L Final     Anion Gap   Date Value Ref Range Status   09/26/2018 12 8 - 16 mmol/L Final   08/08/2013 11 5 - 15 meq/L Final     eGFR if    Date Value Ref Range Status   09/26/2018 9.9 (A) >60 mL/min/1.73 m^2 Final     eGFR if non    Date Value Ref Range Status   09/26/2018 8.6 (A) >60 mL/min/1.73 m^2 Final     Comment:     Calculation used to obtain the estimated glomerular filtration  rate (eGFR) is the CKD-EPI equation.            Erythropoietin (EPO) stimulating agent anemia management patient  -     CBC auto differential; Standing    Chronic kidney disease, stage 5    Anemia of chronic disease    Secondary hyperparathyroidism    Dizziness    BMI 28.0-28.9,adult    Acute pain of left wrist  -     X-Ray Wrist Complete 3 views Left; Future; Expected date: 10/11/2018  -     cyclobenzaprine (FLEXERIL) 5 MG tablet; One tab po with breakfast and lunch take 2 tabs po before bedtime for up to 7 days  Dispense: 30 tablet; Refill: 0    Tendinitis  -     cyclobenzaprine (FLEXERIL) 5 MG tablet; One tab po with breakfast and lunch take 2 tabs po before bedtime for up to 7 days  Dispense: 30 tablet; Refill: 0    Other orders  -     epoetin kitty injection 30,000 Units; Inject 1.5 mLs (30,000 Units total) into the skin every 7 days.    treat tendinitis with muscle relaxers for now    Not on dialysis    Symptomatic anemia with dizziness and intermittent SOB  Worsening GFR and CRn  Proceed with two more does of procrit weekly to help with symptomatic anemia  Increase to 84841 units   Continue  B12 1000 mcg SL daily, pt qualifies to continue  procrit due to   his renal function and lack of response to prior  procrit dosing   History of hemolysis : watching closely   Goal of hemoglobin is 11 and above  He is to continue usual medications for hypertension being Norvasc  Continue Neurontin for intermittent paresthesias   Call with xray results   Thank you for allowing me to evaluate and participate in the care of this pleasant patient. Please fell free to call me with any questions or concerns.    Mikala Frankel MD    This note was dictated with Dragon and briefly proofread. Please excuse any sentences that may be unclear or words misspelled

## 2018-10-12 ENCOUNTER — OFFICE VISIT (OUTPATIENT)
Dept: UROLOGY | Facility: CLINIC | Age: 78
End: 2018-10-12
Payer: MEDICARE

## 2018-10-12 VITALS
RESPIRATION RATE: 18 BRPM | TEMPERATURE: 98 F | HEART RATE: 78 BPM | SYSTOLIC BLOOD PRESSURE: 149 MMHG | BODY MASS INDEX: 28.46 KG/M2 | HEIGHT: 73 IN | DIASTOLIC BLOOD PRESSURE: 83 MMHG | WEIGHT: 214.75 LBS

## 2018-10-12 DIAGNOSIS — R33.9 INCOMPLETE EMPTYING OF BLADDER: ICD-10-CM

## 2018-10-12 DIAGNOSIS — R97.20 ELEVATED PROSTATE SPECIFIC ANTIGEN (PSA): ICD-10-CM

## 2018-10-12 DIAGNOSIS — R35.1 NOCTURIA: ICD-10-CM

## 2018-10-12 DIAGNOSIS — R35.0 FREQUENCY OF URINATION: ICD-10-CM

## 2018-10-12 DIAGNOSIS — N40.0 BENIGN PROSTATIC HYPERPLASIA WITHOUT LOWER URINARY TRACT SYMPTOMS: Primary | ICD-10-CM

## 2018-10-12 DIAGNOSIS — R31.29 MICROSCOPIC HEMATURIA: ICD-10-CM

## 2018-10-12 LAB
BILIRUB SERPL-MCNC: ABNORMAL MG/DL
BLOOD URINE, POC: ABNORMAL
COLOR, POC UA: YELLOW
GLUCOSE UR QL STRIP: ABNORMAL
KETONES UR QL STRIP: ABNORMAL
LEUKOCYTE ESTERASE URINE, POC: ABNORMAL
NITRITE, POC UA: ABNORMAL
PH, POC UA: 6
PROTEIN, POC: 100
SPECIFIC GRAVITY, POC UA: 1.01
UROBILINOGEN, POC UA: ABNORMAL

## 2018-10-12 PROCEDURE — 51798 US URINE CAPACITY MEASURE: CPT | Mod: PBBFAC,PN | Performed by: UROLOGY

## 2018-10-12 PROCEDURE — 81002 URINALYSIS NONAUTO W/O SCOPE: CPT | Mod: PBBFAC,PN | Performed by: UROLOGY

## 2018-10-12 PROCEDURE — 99214 OFFICE O/P EST MOD 30 MIN: CPT | Mod: S$PBB,,, | Performed by: UROLOGY

## 2018-10-12 PROCEDURE — 99999 PR PBB SHADOW E&M-EST. PATIENT-LVL III: CPT | Mod: PBBFAC,,, | Performed by: UROLOGY

## 2018-10-12 PROCEDURE — 87086 URINE CULTURE/COLONY COUNT: CPT

## 2018-10-12 PROCEDURE — 99213 OFFICE O/P EST LOW 20 MIN: CPT | Mod: PBBFAC,PN,25 | Performed by: UROLOGY

## 2018-10-12 PROCEDURE — 81000 URINALYSIS NONAUTO W/SCOPE: CPT | Mod: PBBFAC,PN | Performed by: UROLOGY

## 2018-10-12 NOTE — PROGRESS NOTES
OFFICE NOTE    CHIEF COMPLAINT:  BPH, lower urinary tract symptoms,  history of elevated PSA,   renal insufficiency, and renal cyst.    HISTORY OF PRESENT ILLNESS:  This 78-year-old male returns for recheck.  He has   been under the care of Dr. Herring for management of BPH with lower urinary tract   symptoms and at the last visit with Dr. Herring on 04/27/2018, he had been   placed on a trial of Flomax 0.4 mg p.o. daily for management of his BPH and   lower urinary tract symptoms, but he states he only took it for two weeks and then   he discontinued it because he was concerned about the potential side effects.    He has history of elevated PSA and he had undergone prostate ultrasound   with biopsies in the past by Dr. Herring and there has never been any evidence of   any malignancy.  His most recent PSA was 5.6 on 07/26/2018.  He does have   significant lower tract symptoms with nocturia up to x6 last night.    Bladder scan today revealed 91 mL of residual urine.    His last PSA was 5.6 on 07/26/2018.    UA today did reveal 1+ blood, moderate number of rbc's, and pH 6.0.    FINAL IMPRESSION:  BPH, lower urinary tract symptoms, incomplete bladder   emptying, elevated PSA, microscopic hematuria, and renal insufficiency.    RECOMMENDATIONS:  Urine for C and S and cytology.  Resume the Flomax 0.4 mg p.o.   daily and it was mentioned to the patient to check with Dr. Rojo to make sure   there was no problem for him taking it.  Otherwise, we will need to also   subsequently consider possible cystoscopy in view of the above findings.  It   must be noted that the last renal ultrasound on 09/29/2018 did reveal on mildly   complex left renal cyst that appeared to be stable.      TRAVIS  dd: 10/12/2018 10:40:14 (CDT)  td: 10/13/2018 03:28:59 (CDT)  Doc ID   #[id number]  Job ID #[id number]    CC:

## 2018-10-14 LAB — BACTERIA UR CULT: NO GROWTH

## 2018-10-16 ENCOUNTER — APPOINTMENT (OUTPATIENT)
Dept: LAB | Facility: HOSPITAL | Age: 78
End: 2018-10-16
Attending: UROLOGY
Payer: MEDICARE

## 2018-10-16 ENCOUNTER — OFFICE VISIT (OUTPATIENT)
Dept: PHYSICAL MEDICINE AND REHAB | Facility: CLINIC | Age: 78
End: 2018-10-16
Payer: MEDICARE

## 2018-10-16 VITALS
HEART RATE: 74 BPM | DIASTOLIC BLOOD PRESSURE: 79 MMHG | BODY MASS INDEX: 28.36 KG/M2 | WEIGHT: 214 LBS | HEIGHT: 73 IN | SYSTOLIC BLOOD PRESSURE: 144 MMHG

## 2018-10-16 DIAGNOSIS — M25.562 LEFT KNEE PAIN, UNSPECIFIED CHRONICITY: ICD-10-CM

## 2018-10-16 PROCEDURE — 99214 OFFICE O/P EST MOD 30 MIN: CPT | Mod: PBBFAC,PN | Performed by: PHYSICAL MEDICINE & REHABILITATION

## 2018-10-16 PROCEDURE — 99214 OFFICE O/P EST MOD 30 MIN: CPT | Mod: 25,S$PBB,, | Performed by: PHYSICAL MEDICINE & REHABILITATION

## 2018-10-16 PROCEDURE — 64450 NJX AA&/STRD OTHER PN/BRANCH: CPT | Mod: PBBFAC,PN | Performed by: PHYSICAL MEDICINE & REHABILITATION

## 2018-10-16 PROCEDURE — 88112 CYTOPATH CELL ENHANCE TECH: CPT | Performed by: PATHOLOGY

## 2018-10-16 PROCEDURE — 64450 NJX AA&/STRD OTHER PN/BRANCH: CPT | Mod: S$PBB,LT,, | Performed by: PHYSICAL MEDICINE & REHABILITATION

## 2018-10-16 PROCEDURE — 99999 PR PBB SHADOW E&M-EST. PATIENT-LVL IV: CPT | Mod: PBBFAC,,, | Performed by: PHYSICAL MEDICINE & REHABILITATION

## 2018-10-16 PROCEDURE — 76942 ECHO GUIDE FOR BIOPSY: CPT | Mod: 26,S$PBB,, | Performed by: PHYSICAL MEDICINE & REHABILITATION

## 2018-10-16 PROCEDURE — 76942 ECHO GUIDE FOR BIOPSY: CPT | Mod: PBBFAC,PN | Performed by: PHYSICAL MEDICINE & REHABILITATION

## 2018-10-16 RX ORDER — LIDOCAINE HYDROCHLORIDE 10 MG/ML
10 INJECTION INFILTRATION; PERINEURAL
Status: COMPLETED | OUTPATIENT
Start: 2018-10-16 | End: 2018-10-16

## 2018-10-16 RX ADMIN — LIDOCAINE HYDROCHLORIDE 10 ML: 10 INJECTION INFILTRATION; PERINEURAL at 10:10

## 2018-10-16 NOTE — LETTER
October 16, 2018      Viktoria Chauhan PA-C  [address]  [place] [address]           [facility]  [address] [place] [address] [address]  [place] [address]  Phone: [phone]  Fax: [phone]          Patient: Micheal Hunt   MR Number: [id number]   YOB: 1940   Date of Visit: 10/16/2018       Dear Viktoria Chauhan:    Thank you for referring Micheal Hunt to me for evaluation. Attached you will find relevant portions of my assessment and plan of care.    If you have questions, please do not hesitate to call me. I look forward to following Micheal Hunt along with you.    Sincerely,    Yanira Whitehead,     Enclosure  CC:  No Recipients    If you would like to receive this communication electronically, please contact [email or web address] or [phone] to request more information on [facility] Link access.    For providers and/or their staff who would like to refer a patient to [facility], please contact us through our one-stop-shop provider referral line, [facility], at [phone].    If you feel you have received this communication in error or would no longer like to receive these types of communications, please e-mail [email or web address]

## 2018-10-16 NOTE — PROGRESS NOTES
HPI:  Patient is a 77 y.o. year old male w. CKD 5 , CAD s/p stents s/p cabbg and anemia.  He s/p left tkr w. Continued knee pain. In past left saphenous nerve block+hydrodissection has helped. His last nerve block was in august. His pain is starting to return.  He is very active. He is currently building a wrap around porch on his house. He has developed wrist pain. He was told he had carpal tunnel and needed surgery. Occasionally his entire left arm hurts. This started developing after he began his construction project.     Imaging  FINDINGS:  There are vascular calcifications.  The degenerative changes radiocarpal joint as well as carpal carpal and carpal metacarpal joints more so and severe at the carpal 1st metacarpal joint with there is also severe joint space narrowing, sclerosis, bony eburnation, cystic lucencies.  There is no acute fracture or dislocation.      Impression       Degenerative changes more so and severe at carpal 1st metacarpal joint.                Findings: Left knee arthroplasty changes are identified. Components project in stable position without radiographically apparent hardware loosening. Small left knee joint effusion noted. There is enthesophyte formation around the patella. Diffuse atherosclerosis is noted.    No acute fracture or dislocation is seen.       Impression         1.  Stable appearance of left knee arthroplasty without radiographically apparent complication.  2. Small left knee joint effusion.        Labs  egfr 82, cr 6.8  lft's nl             Past Medical History:   Diagnosis Date    Allergy      Arthritis       Gout    BPH (benign prostatic hyperplasia)      CAD (coronary artery disease) 2006    Chronic kidney disease       due to ibuprofen    Colon polyp      Diverticulosis      Gastritis      GERD (gastroesophageal reflux disease)      HTN (hypertension) 3/27/2012    Hyperlipidemia      LVH (left ventricular hypertrophy)      Mesenteric ischemia       Murmur, cardiac 3/27/2012    GRICELDA (obstructive sleep apnea)       DOES NOT USE A MACHINE    Sinus problem      Syncope and collapse              Past Surgical History:   Procedure Laterality Date    APPENDECTOMY        CARDIAC CATHETERIZATION        COLONOSCOPY       COLONOSCOPY N/A 5/3/2018     Procedure: COLONOSCOPY;  Surgeon: Messi Harris MD;  Location: [facility];  Service: Endoscopy;  Laterality: N/A;    CORONARY ARTERY BYPASS GRAFT   2007     x 1    JOINT REPLACEMENT         left knee total replacement  X 3    mid leftt finger         from a cactuss    MOLE REMOVAL       rotative cuff         no rotative cuffs on bilat shoulders has pins     SHOULDER SURGERY         shoulder surgery bilat  RIGHT X 4; LEFT X 3            Family History   Problem Relation Age of Onset    Heart disease Mother      Sudden death Father      Cancer Father           advanced lung ca- found after 2 story fall    Stroke Sister      Pneumonia Sister            from PNA    Heart disease Sister      Hypertension Brother      Kidney cancer Neg Hx      Prostate cancer Neg Hx      Urolithiasis Neg Hx      Allergic rhinitis Neg Hx      Allergies Neg Hx      Angioedema Neg Hx      Asthma Neg Hx      Atopy Neg Hx      Eczema Neg Hx      Immunodeficiency Neg Hx      Rhinitis Neg Hx      Urticaria Neg Hx        Social History            Social History    Marital status:        Spouse name: N/A    Number of children: N/A    Years of education: N/A           Social History Main Topics    Smoking status: Never Smoker    Smokeless tobacco: Never Used    Alcohol use No    Drug use: No    Sexual activity: Not on file           Other Topics Concern    Not on file          Social History Narrative     [place]; daughter nearby               Review of patient's allergies indicates:   Allergen Reactions    Ace inhibitors Other (See Comments)       Cough    Arb-angiotensin  receptor antagonist Itching    Eplerenone Other (See Comments)       Marked bradycardia, 40, tiredness and weakness       Sulfa (sulfonamide antibiotics) Itching       Patient says this was 10 years ago and doesn't remember what happened         Current Outpatient Prescriptions:     albuterol 90 mcg/actuation inhaler, 2 puffs every 4 hours as needed for cough, wheeze, or shortness of breath, Disp: 3 Inhaler, Rfl: 3    albuterol-ipratropium 2.5mg-0.5mg/3mL (DUO-NEB) 0.5 mg-3 mg(2.5 mg base)/3 mL nebulizer solution, INHALE 1 VIAL BY NEBULIZER EVERY 6 HOURS AS NEEDED FOR WHEEZING, Disp: 270 mL, Rfl: 0    allopurinol (ZYLOPRIM) 100 MG tablet, Take 1 tablet (100 mg total) by mouth once daily., Disp: 90 tablet, Rfl: 1    amLODIPine (NORVASC) 2.5 MG Tab, Take 1 tablet (2.5 mg total) by mouth every evening., Disp: 903 tablet, Rfl: 1    aspirin (ECOTRIN) 81 MG EC tablet, Take 81 mg by mouth once daily.  , Disp: , Rfl:     atorvastatin (LIPITOR) 80 MG tablet, TAKE 1 TABLET (80 MG TOTAL) BY MOUTH ONCE DAILY., Disp: 90 tablet, Rfl: 3    carvedilol (COREG) 6.25 MG tablet, TAKE 1 TABLET (6.25 MG TOTAL) BY MOUTH 2 (TWO) TIMES DAILY WITH MEALS., Disp: 90 tablet, Rfl: 3    COLCRYS 0.6 mg tablet, TAKE 1 TABLET (0.6 MG TOTAL) BY MOUTH ONCE DAILY., Disp: , Rfl: 11    cyanocobalamin, vitamin B-12, (VITAMIN B-12) 1,000 mcg Subl, Take 1 tablet by mouth daily as needed. Takes 3,000mcg, Disp: , Rfl:     finasteride (PROSCAR) 5 mg tablet, TAKE 1 TABLET ONCE DAILY., Disp: 90 tablet, Rfl: 3    fluticasone (FLONASE) 50 mcg/actuation nasal spray, 2 sprays (100 mcg total) by Each Nare route once daily. (Patient taking differently: 2 sprays by Each Nare route daily as needed. ), Disp: 3 Bottle, Rfl: 3    gabapentin (NEURONTIN) 100 MG capsule, Take 1 capsule (100 mg total) by mouth every evening., Disp: 90 capsule, Rfl: 0    isosorbide mononitrate (ISMO,MONOKET) 10 mg tablet, Take 1 tablet (10 mg total) by mouth every evening., Disp:  30 tablet, Rfl: 3    NITROSTAT 0.4 mg SL tablet, PLACE 1 TABLET (0.4 MG TOTAL) UNDER THE TONGUE EVERY 5 (FIVE) MINUTES AS NEEDED FOR CHEST PAIN., Disp: 25 tablet, Rfl: 3    sodium chloride (SALINE NASAL MIST) 3 % Mist, 1 spray by Nasal route 2 (two) times daily., Disp: , Rfl:     tamsulosin (FLOMAX) 0.4 mg Cp24, Take 1 capsule (0.4 mg total) by mouth once daily., Disp: 30 capsule, Rfl: 11    valsartan (DIOVAN) 320 MG tablet, Take 1 tablet (320 mg total) by mouth once daily., Disp: 90 tablet, Rfl: 3    zolpidem (AMBIEN) 5 MG Tab, TAKE 1 TABLET (5 MG TOTAL) BY MOUTH NIGHTLY AS NEEDED., Disp: 30 tablet, Rfl: 5           Review of Systems  No nausea, vomiting, fevers, Chills , contipation, diarrhea or sweats        Physical Exam:          Vitals:     08/06/18 1244   BP: (!) 156/69   Pulse: (!) 57      alert and oriented ×4 follows commands answers all questions appropriately  Manual muscle test 5 out of 5 sensation to light touch grossly intact  No knee laxity  Antalgic gait  No knee effusion no clubbing cyanosis or edema   Tenderness w. Left wrist extension against resistance  Excruciate tenderness left medial knee  Incision scar anterior knee left  Assessment:  Left wrist tendinitis  Left saphenous neuralgia s/p TKR   wrist and hand oa   CKD 5 , CAD s/p stents s/p cabbg and anemia.  Assessment:  voltaren gel tid + ice  Use wrist brace to rest wrist f/u in 3 wks  Repeat left saphenous nerve block+hydrodissection as it helped in past- last done in 8/2018  Will consider emg/ncs if symptoms do not resolve in his left arm        pROCEDURE NOTE: risk and benefit of left sAPHENOUS NERVE BLOCK AND HYDRODISEECTION injection reviewed with. patient. Verbal consent was obtained. Injection was performed after sterile prep w. Betadine +cholorohexedine. MEDIAL approach used. ALONG  EDGE OF VASTUS MEDIALIS MUSCLE. 25g 2 inch needle used hydrodissecting along vastus medialis facial plane   Ultrasound guidance was utilized for  needle visualization. A TOTAL OF 5ML OF LIDOCAINE 1% AND 5ML OF STERILE NORMAL SALINE WAS UTILIZED. No complications. iMMEDIATE IMPROVEMENT OF KNEE PAIN POST PROCEDURE      Ultrasound interpretation was performed prior to the procedure to identify the target and any adjacent neurovascular structures.  Subsequently, interpretation was performed during real- time needle guidance confirming placement. Post- intervention interpretation was also performed confirming appropriate injectate flow and hemostasis.  Images indicating needle placement have been saved in [facility].

## 2018-10-17 ENCOUNTER — HOSPITAL ENCOUNTER (EMERGENCY)
Facility: HOSPITAL | Age: 78
Discharge: HOME | End: 2018-10-17
Attending: EMERGENCY MEDICINE
Payer: MEDICARE

## 2018-10-17 VITALS
BODY MASS INDEX: 28.1 KG/M2 | HEART RATE: 83 BPM | HEIGHT: 73 IN | DIASTOLIC BLOOD PRESSURE: 95 MMHG | SYSTOLIC BLOOD PRESSURE: 157 MMHG | WEIGHT: 212 LBS | OXYGEN SATURATION: 99 % | RESPIRATION RATE: 18 BRPM

## 2018-10-17 DIAGNOSIS — M47.812 SPONDYLOSIS OF CERVICAL REGION WITHOUT MYELOPATHY OR RADICULOPATHY: ICD-10-CM

## 2018-10-17 DIAGNOSIS — S01.81XA FACIAL LACERATION, INITIAL ENCOUNTER: ICD-10-CM

## 2018-10-17 DIAGNOSIS — M54.2 NECK PAIN: ICD-10-CM

## 2018-10-17 DIAGNOSIS — S02.2XXA CLOSED DISPLACED FRACTURE OF NASAL BONE, INITIAL ENCOUNTER: Primary | ICD-10-CM

## 2018-10-17 DIAGNOSIS — T14.90XA INJURY: ICD-10-CM

## 2018-10-17 DIAGNOSIS — S00.83XA CONTUSION OF FOREHEAD, INITIAL ENCOUNTER: ICD-10-CM

## 2018-10-17 PROCEDURE — 70450 CT HEAD/BRAIN W/O DYE: CPT | Mod: 26,,, | Performed by: RADIOLOGY

## 2018-10-17 PROCEDURE — 72040 X-RAY EXAM NECK SPINE 2-3 VW: CPT | Mod: 26,,, | Performed by: RADIOLOGY

## 2018-10-17 PROCEDURE — 72040 X-RAY EXAM NECK SPINE 2-3 VW: CPT | Mod: TC,FY

## 2018-10-17 PROCEDURE — 70450 CT HEAD/BRAIN W/O DYE: CPT | Mod: TC

## 2018-10-17 PROCEDURE — 96372 THER/PROPH/DIAG INJ SC/IM: CPT

## 2018-10-17 PROCEDURE — 99284 EMERGENCY DEPT VISIT MOD MDM: CPT | Mod: 25

## 2018-10-17 PROCEDURE — 25000003 PHARM REV CODE 250: Performed by: EMERGENCY MEDICINE

## 2018-10-17 PROCEDURE — 63600175 PHARM REV CODE 636 W HCPCS: Performed by: EMERGENCY MEDICINE

## 2018-10-17 RX ORDER — OXYCODONE AND ACETAMINOPHEN 5; 325 MG/1; MG/1
1-2 TABLET ORAL EVERY 6 HOURS PRN
Qty: 18 TABLET | Refills: 0 | Status: SHIPPED | OUTPATIENT
Start: 2018-10-17 | End: 2018-10-17 | Stop reason: SDUPTHER

## 2018-10-17 RX ORDER — OXYCODONE AND ACETAMINOPHEN 5; 325 MG/1; MG/1
1 TABLET ORAL EVERY 6 HOURS PRN
Qty: 14 TABLET | Refills: 0 | Status: SHIPPED | OUTPATIENT
Start: 2018-10-17 | End: 2018-11-05 | Stop reason: ALTCHOICE

## 2018-10-17 RX ORDER — CEPHALEXIN 250 MG/1
1000 CAPSULE ORAL
Status: COMPLETED | OUTPATIENT
Start: 2018-10-17 | End: 2018-10-17

## 2018-10-17 RX ORDER — PREDNISONE 10 MG/1
40 TABLET ORAL DAILY
Qty: 20 TABLET | Refills: 0 | Status: SHIPPED | OUTPATIENT
Start: 2018-10-17 | End: 2018-10-19

## 2018-10-17 RX ORDER — CEPHALEXIN 500 MG/1
1000 CAPSULE ORAL EVERY 12 HOURS
Qty: 20 CAPSULE | Refills: 0 | Status: SHIPPED | OUTPATIENT
Start: 2018-10-17 | End: 2018-10-19

## 2018-10-17 RX ORDER — OXYCODONE AND ACETAMINOPHEN 5; 325 MG/1; MG/1
2 TABLET ORAL
Status: COMPLETED | OUTPATIENT
Start: 2018-10-17 | End: 2018-10-17

## 2018-10-17 RX ORDER — METHYLPREDNISOLONE SOD SUCC 125 MG
125 VIAL (EA) INJECTION
Status: COMPLETED | OUTPATIENT
Start: 2018-10-17 | End: 2018-10-17

## 2018-10-17 RX ADMIN — METHYLPREDNISOLONE SODIUM SUCCINATE 125 MG: 125 INJECTION, POWDER, FOR SOLUTION INTRAMUSCULAR; INTRAVENOUS at 03:10

## 2018-10-17 RX ADMIN — OXYCODONE HYDROCHLORIDE AND ACETAMINOPHEN 2 TABLET: 5; 325 TABLET ORAL at 02:10

## 2018-10-17 RX ADMIN — CEPHALEXIN 1000 MG: 250 CAPSULE ORAL at 04:10

## 2018-10-17 NOTE — DISCHARGE INSTRUCTIONS
Percocet 1-2 every 6 hr as needed for acute pain  Prednisone 40 mg daily x5 days  Keflex 500 mg 2 tablets twice daily x5 days  Basic wound care twice daily  Nasal laceration has 2 nonabsorbable sutures that need removed in 5-7 days   Upper lip laceration was closed with an absorbable suture  Follow-up ear nose and throat physician  Have sutures removed either by ENT or return to the emergency department for removal

## 2018-10-17 NOTE — ED NOTES
Patient arrives to ER#2 via wheelchair for evaluation. Patient complains facial and neck pain due to fall. Patient reports he was at the golf cart shop buying a golf cart, getting his feet tangled up in some wire, falling face first onto concrete. Patient denies loc. Band-aids in place, patient holding pressure to nose with napkin. Large clots noted. Abrasion and hematoma noted to forehead, laceration noted to nose and left upper lip. AAOX4. Respirations even and unlabored. No acute distress noted.

## 2018-10-17 NOTE — ED PROVIDER NOTES
Encounter Date: 10/17/2018       History     Chief Complaint   Patient presents with    Fall     pt reports tripping on wire and falling forward striking face and nose on concrete.     HPI  Review of patient's allergies indicates:   Allergen Reactions    Ace inhibitors Other (See Comments)     Cough    Arb-angiotensin receptor antagonist Itching    Eplerenone Other (See Comments)     Marked bradycardia, 40, tiredness and weakness      Sulfa (sulfonamide antibiotics) Itching     Patient says this was 10 years ago and doesn't remember what happened     Past Medical History:   Diagnosis Date    NICK (acute kidney injury) 7/29/2016    Allergy     Anemia, mild 12/15/2014    Arthritis     Gout    Benign essential HTN 3/27/2012    BMI 29.0-29.9,adult 5/10/2018    BPH (benign prostatic hyperplasia)     CAD (coronary artery disease) 2006    Chronic kidney disease     due to ibuprofen    Colon polyp     Diverticulosis     Gastritis     GERD (gastroesophageal reflux disease)     History of colon polyps 5/3/2018    HTN (hypertension) 3/27/2012    Hyperlipidemia     LLL pneumonia 6/14/2018    LVH (left ventricular hypertrophy)     Mesenteric ischemia     Murmur, cardiac 3/27/2012    GRICELDA (obstructive sleep apnea)     DOES NOT USE A MACHINE    Sinus problem     Syncope and collapse      Past Surgical History:   Procedure Laterality Date    APPENDECTOMY      BLOCK-NERVE Left 5/31/2016    [facility]    CARDIAC CATHETERIZATION      COLONOSCOPY  2011    COLONOSCOPY N/A 5/3/2018    Procedure: COLONOSCOPY;  Surgeon: Messi Harris MD;  Location: [facility];  Service: Endoscopy;  Laterality: N/A;    COLONOSCOPY N/A 5/3/2018    [facility] at [facility]    COLONOSCOPY N/A 9/10/2015    [facility] at [facility]    CORONARY ARTERY BYPASS GRAFT  4/2007    x 1    CYSTOSCOPY N/A 8/30/2017    [facility]ring [facility] at [facility]     CYSTOSCOPY N/A 11/10/2015    [facility] OR    ESOPHAGOGASTRODUODENOSCOPY (EGD) N/A 2016    [facility] Tra [facility] ([facility]    ESOPHAGOGASTRODUODENOSCOPY (EGD) N/A 10/15/2014    Performed by Messi Harris [facility] at [facility] ENDO    INJECTION-STEROID-EPIDURAL-TRANSFORAMINAL Left 2016    [facility]    JOINT REPLACEMENT      left knee total replacement  X 3    mid leftt finger      from a cactuss    MOLE REMOVAL  2016    RADIOFREQUENCY THERMOCOAGULATION (RFTC)-NERVE-MEDIAN BRANCH-LUMBAR Left 2016    [facility]    rotative cuff      no rotative cuffs on bilat shoulders has pins     SHOULDER SURGERY      shoulder surgery bilat  RIGHT X 4; LEFT X 3    TRANSRECTAL ULTRASOUND GUIDED PROSTATE BIOPSY Bilateral 11/10/2015    Performed by [facility] at [facility] OR    ULTRASOUND-ENDOSCOPIC-UPPER N/A 2017    Performed [facility] Tra [facility] at [facility] ENDO (2ND FLR)    ULTRASOUND-ENDOSCOPIC-UPPER N/A 2016    Performed by Tra Brooks MD at [facility] ENDO (2ND FLR)    ULTRASOUND-ENDOSCOPIC-UPPER N/A 2015    Performed by Jason Saleem MD at [facility] ENDO (2ND FLR)     Family History   Problem Relation Age of Onset    Heart disease Mother     Sudden death Father     Cancer Father         advanced lung ca- found after 2 story fall    Stroke Sister     Pneumonia Sister          from PNA    Heart disease Sister     Hypertension Brother     Kidney cancer Neg Hx     Prostate cancer Neg Hx     Urolithiasis Neg Hx     Allergic rhinitis Neg Hx     Allergies Neg Hx     Angioedema Neg Hx     Asthma Neg Hx     Atopy Neg Hx     Eczema Neg Hx     Immunodeficiency Neg Hx     Rhinitis Neg Hx     Urticaria Neg Hx      Social History     Tobacco Use    Smoking status: Never Smoker    Smokeless tobacco: Never Used   Substance Use Topics    Alcohol use: No    Drug use: No      Review of Systems    Physical Exam     Initial Vitals [10/17/18 1321]   BP Pulse Resp Temp SpO2   (!) 157/95 83 18 -- 99 %      MAP       --         Physical Exam    ED Course   Procedures  Labs Reviewed - No data to display       Imaging Results          X-Ray Cervical Spine AP And Lateral (Final result)  Result time 10/17/18 14:18:23    Final result by Huang Treadwell MD (10/17/18 14:18:23)                 Impression:      1. Partial body fusion of C5-C6.  2. Moderate to severe multilevel degenerative disc disease with large bridging anterior osteophytosis.  3. No significant prevertebral soft tissue swelling.      Electronically signed by: Huang Treadwell  Date:    10/17/2018  Time:    14:18             Narrative:    EXAMINATION:  XR CERVICAL SPINE AP LATERAL    CLINICAL HISTORY:  Injury, unspecified, initial encounter    TECHNIQUE:  AP, lateral and open mouth views of the cervical spine were performed.    COMPARISON:  None.    FINDINGS:  Mild straightening normal cervical lordosis.  Cervical vertebral bodies otherwise normal in height and alignment.  No acute fracture.  Partial interbody fusion of C5-C6.    No significant prevertebral soft tissue swelling.    There is moderate to severe multilevel degenerative disc disease with large bridging anterior osteophytosis.    Extensive bilateral calcified atherosclerotic change of the carotid arteries.                               CT Head Without Contrast (Final result)  Result time 10/17/18 13:57:22    Final result by Huang Treadwell MD (10/17/18 13:57:22)                 Impression:      1. Cortical atrophy with periventricular deep white matter change consistent with advanced chronic small vessel ischemic disease.  2. Small frontal cephalohematoma.  3. Comminuted nasal bone fracture.      Electronically signed by: Huang Treadwell  Date:    10/17/2018  Time:    13:57             Narrative:    EXAMINATION:  CT HEAD WITHOUT CONTRAST    CLINICAL HISTORY:  Head  trauma, minor, GCS>=13, NOC/NEXUS/CCR neg, first study;    TECHNIQUE:  Low dose axial images were obtained through the head.  Coronal and sagittal reformations were also performed. Contrast was not administered.    COMPARISON:  None.    FINDINGS:  There is no acute hemorrhage or infarction.  There is cortical atrophy.  There are periventricular deep white matter changes consistent with advanced chronic small vessel ischemic disease.    No extra-axial fluid collections.  Ventricles are normal in size, shape and configuration.  The basal cisterns are patent.    The imaged paranasal sinuses and ethmoid air cells are well aerated.  The mastoid air cells and middle ears are normally pneumatized.    Small frontal cephalohematoma.  Comminuted nasal bone fracture with associated soft tissue swelling.                                                      Clinical Impression:   The primary encounter diagnosis was Closed displaced fracture of nasal bone, initial encounter. Diagnoses of Neck pain, Injury, Facial laceration, initial encounter, Contusion of forehead, initial encounter, and Spondylosis of cervical region without myelopathy or radiculopathy were also pertinent to this visit.                             Denny Gonzales MD  10/18/18 0225

## 2018-10-18 ENCOUNTER — PATIENT MESSAGE (OUTPATIENT)
Dept: HEMATOLOGY/ONCOLOGY | Facility: CLINIC | Age: 78
End: 2018-10-18

## 2018-10-18 ENCOUNTER — PATIENT MESSAGE (OUTPATIENT)
Dept: FAMILY MEDICINE | Facility: CLINIC | Age: 78
End: 2018-10-18

## 2018-10-19 ENCOUNTER — OFFICE VISIT (OUTPATIENT)
Dept: FAMILY MEDICINE | Facility: CLINIC | Age: 78
End: 2018-10-19
Payer: MEDICARE

## 2018-10-19 VITALS
BODY MASS INDEX: 28.08 KG/M2 | TEMPERATURE: 98 F | WEIGHT: 211.88 LBS | SYSTOLIC BLOOD PRESSURE: 138 MMHG | HEART RATE: 74 BPM | HEIGHT: 73 IN | DIASTOLIC BLOOD PRESSURE: 67 MMHG

## 2018-10-19 DIAGNOSIS — R04.0 EPISTAXIS: ICD-10-CM

## 2018-10-19 DIAGNOSIS — S02.2XXA CLOSED DISPLACED FRACTURE OF NASAL BONE, INITIAL ENCOUNTER: ICD-10-CM

## 2018-10-19 DIAGNOSIS — S09.8XXD BLUNT HEAD TRAUMA, SUBSEQUENT ENCOUNTER: Primary | ICD-10-CM

## 2018-10-19 DIAGNOSIS — S13.9XXA NECK SPRAIN, INITIAL ENCOUNTER: ICD-10-CM

## 2018-10-19 DIAGNOSIS — M47.812 SPONDYLOSIS OF CERVICAL REGION WITHOUT MYELOPATHY OR RADICULOPATHY: ICD-10-CM

## 2018-10-19 PROCEDURE — 99214 OFFICE O/P EST MOD 30 MIN: CPT | Mod: PBBFAC,PO,25 | Performed by: FAMILY MEDICINE

## 2018-10-19 PROCEDURE — 30901 CONTROL OF NOSEBLEED: CPT | Mod: S$PBB,,, | Performed by: FAMILY MEDICINE

## 2018-10-19 PROCEDURE — 30901 CONTROL OF NOSEBLEED: CPT | Mod: PBBFAC,PO | Performed by: FAMILY MEDICINE

## 2018-10-19 PROCEDURE — 99214 OFFICE O/P EST MOD 30 MIN: CPT | Mod: S$PBB,25,, | Performed by: FAMILY MEDICINE

## 2018-10-19 PROCEDURE — 99999 PR PBB SHADOW E&M-EST. PATIENT-LVL IV: CPT | Mod: PBBFAC,,, | Performed by: FAMILY MEDICINE

## 2018-10-19 RX ORDER — GABAPENTIN 300 MG/1
300 CAPSULE ORAL NIGHTLY
Qty: 90 CAPSULE | Refills: 3 | Status: SHIPPED | OUTPATIENT
Start: 2018-10-19 | End: 2019-10-11

## 2018-10-19 RX ORDER — CARVEDILOL 6.25 MG/1
6.25 TABLET ORAL 2 TIMES DAILY WITH MEALS
Qty: 60 TABLET | Refills: 3 | Status: ON HOLD | OUTPATIENT
Start: 2018-10-19 | End: 2019-03-26 | Stop reason: CLARIF

## 2018-10-19 RX ORDER — CEPHALEXIN 500 MG/1
1000 CAPSULE ORAL EVERY 8 HOURS
Qty: 20 CAPSULE | Refills: 0 | Status: SHIPPED | OUTPATIENT
Start: 2018-10-19 | End: 2018-10-21

## 2018-10-19 RX ORDER — PREDNISONE 10 MG/1
10 TABLET ORAL DAILY
Qty: 5 TABLET | Refills: 0 | Status: ON HOLD
Start: 2018-10-19 | End: 2018-10-22 | Stop reason: SDUPTHER

## 2018-10-19 NOTE — PROGRESS NOTES
Subjective:       Patient ID: Micheal Hunt is a 78 y.o. male.    Chief Complaint: Hospital Follow Up    Mr. Hunt had fallen at [employer].  He has fallen due to a missed step.  The fall was directly to the ground approximately 4 ft.  He was unable to protect his face therefore he broke his nose.  He had forehead lacerations repair at the emergency room.  He has moderate nasal congestion due to nasal bleeding.  Nasal bleeding has stopped and nasal clots remove.  Patient has persistent neck pain and stiffness.  He denies loss of conscious nor paresthesias nor upper arm this weakness.  He has previous history of degenerative joint disease. He is on anticoagulation.  Patient has Chronic kidney disease level 4 therefore prednisone has been decreased from 40-10 mg.  Patient advised to use Percocet sparingly, at bedtime only.  Patient has chronic iron-deficiency anemia and chronic disease. He is not orthostatic.      Head Injury    The incident occurred 12 to 24 hours ago. The injury mechanism was a fall. There was no loss of consciousness. The quality of the pain is described as aching. The pain is at a severity of 2/10. The pain is mild. The pain has been intermittent since the injury. Associated symptoms include disorientation and weakness. Pertinent negatives include no blurred vision, headaches, memory loss, numbness, tinnitus or vomiting. He has tried acetaminophen and prescription drugs for the symptoms. The treatment provided moderate relief.     Review of Systems   Constitutional: Positive for fatigue. Negative for diaphoresis and fever.   HENT: Positive for congestion, hearing loss, nosebleeds, postnasal drip and rhinorrhea. Negative for tinnitus.    Eyes: Negative for blurred vision, photophobia, pain and visual disturbance.   Respiratory: Negative for apnea, cough and choking.    Cardiovascular: Negative for chest pain, palpitations and leg swelling.   Gastrointestinal: Negative for  abdominal distention, abdominal pain, anal bleeding and vomiting.   Endocrine: Negative for cold intolerance, heat intolerance and polydipsia.   Musculoskeletal: Positive for arthralgias, myalgias, neck pain and neck stiffness.   Neurological: Positive for weakness. Negative for numbness and headaches.   Psychiatric/Behavioral: Positive for decreased concentration. Negative for memory loss.       Patient Active Problem List   Diagnosis    Osteoarthritis of right knee, L-TKR 10/2012    Nocturia    Hematuria    Carotid stenosis    Essential hypertension    Hyperlipidemia, baseline     CAD (coronary artery disease), 2V CABG 2007    Gout, arthritis    Dizziness    LVH (left ventricular hypertrophy) due to hypertensive disease    Abnormal cardiovascular stress test    GERD (gastroesophageal reflux disease)    Elevated PSA    Acquired hammer toe    Diastolic dysfunction    Abdominal obesity    Back pain, chronic    Glucose intolerance (impaired glucose tolerance)    Anemia of chronic disease    Gastric nodule    Diverticulitis, 2005, 2015    Personal history of colonic polyps    PSA elevation    DDD (degenerative disc disease), lumbar    LV dysfunction, EF 50%, reduced to 34%, now 46%    Other spondylosis, lumbar region    Knee pain    BPH with obstruction/lower urinary tract symptoms    Itching, both legs, onset 7/2017    Chronic sinusitis    Mild persistent asthma without complication    Sigmoid diverticulitis, 3 episodes, 2005, 2015, 2018    Anemia due to stage 4 chronic kidney disease    Thrombocytopenia    Renal cyst    Unilateral inguinal hernia    Chronic kidney disease, stage 5    Generalized abdominal pain    Nonrheumatic aortic insufficiency with aortic stenosis, moderate    Symptomatic bradycardia    Secondary hyperparathyroidism    Erythropoietin (EPO) stimulating agent anemia management patient       Objective:      Physical Exam   Constitutional: He is  oriented to person, place, and time. He appears well-developed and well-nourished.   HENT:   Head:       Right Ear: Decreased hearing is noted.   Left Ear: Decreased hearing is noted.   Nose: Mucosal edema, nasal deformity, septal deviation and nasal septal hematoma present. Epistaxis is observed.   Forehead laceration healing.    Under aseptic technique Silver nitrate apply.  Patient has epistaxis on both nasal nares.  Both nasal passages were clean and indicated with saline.  Patient tolerated the procedure.  He was observed for several minutes no signs of postnasal bleeding.  Advised to use a saline washes and Afrin nasal spray.   Eyes: EOM and lids are normal. Right eye exhibits no chemosis, no discharge and no exudate. Left eye exhibits no chemosis, no discharge and no exudate. No foreign body present in the left eye.   Neck: Phonation normal. No spinous process tenderness present. No neck rigidity. Normal range of motion present. No Brudzinski's sign and no Kernig's sign noted. No thyroid mass present.   Cardiovascular: Normal rate, regular rhythm and normal heart sounds.   Pulmonary/Chest: Effort normal and breath sounds normal.   Musculoskeletal: He exhibits no edema.   Neurological: He is alert and oriented to person, place, and time. He has normal strength. He displays no atrophy. A cranial nerve deficit is present. No sensory deficit. Coordination abnormal. GCS eye subscore is 4. GCS verbal subscore is 5. GCS motor subscore is 6.   Reflex Scores:       Tricep reflexes are 2+ on the right side and 2+ on the left side.       Bicep reflexes are 2+ on the right side and 2+ on the left side.       Brachioradialis reflexes are 2+ on the right side and 2+ on the left side.       Patellar reflexes are 2+ on the right side and 2+ on the left side.       Achilles reflexes are 2+ on the right side and 2+ on the left side.  Skin: Skin is warm and dry.   Psychiatric: His speech is normal. His mood appears anxious. He  is agitated. He exhibits a depressed mood.   Nursing note and vitals reviewed.    .e  Lab Results   Component Value Date    WBC 6.41 10/10/2018    HGB 9.9 (L) 10/10/2018    HCT 31.4 (L) 10/10/2018     10/10/2018    CHOL 89 (L) 07/26/2018    TRIG 68 07/26/2018    HDL 29 (L) 07/26/2018    ALT 17 09/24/2018    AST 18 09/24/2018     09/26/2018    K 3.7 09/26/2018     09/26/2018    CREATININE 5.8 (H) 09/26/2018    BUN 64 (H) 09/26/2018    CO2 21 (L) 09/26/2018    TSH 1.885 08/29/2018    PSA 5.6 (H) 07/26/2018    INR 1.0 07/26/2018    HGBA1C 6.2 02/13/2015     The ASCVD Risk score (Bhavin ALVAREZ Jr., et al., 2013) failed to calculate for the following reasons:    The valid total cholesterol range is 130 to 320 mg/dL    Assessment:       1. Blunt head trauma, subsequent encounter    2. Epistaxis    3. Neck sprain, initial encounter    4. Spondylosis of cervical region without myelopathy or radiculopathy    5. Closed displaced fracture of nasal bone, initial encounter        Plan:       Blunt head trauma, subsequent encounter  -     Ambulatory Referral to Physical/Occupational Therapy    Epistaxis    Neck sprain, initial encounter  -     Ambulatory Referral to Physical/Occupational Therapy  -     cephALEXin (KEFLEX) 500 MG capsule; Take 2 capsules (1,000 mg total) by mouth every 8 (eight) hours. for 5 days  Dispense: 20 capsule; Refill: 0  -     gabapentin (NEURONTIN) 300 MG capsule; Take 1 capsule (300 mg total) by mouth every evening.  Dispense: 90 capsule; Refill: 3    Spondylosis of cervical region without myelopathy or radiculopathy  -     predniSONE (DELTASONE) 10 MG tablet; Take 1 tablet (10 mg total) by mouth once daily. for 5 days  Dispense: 5 tablet; Refill: 0    Closed displaced fracture of nasal bone, initial encounter  -     cephALEXin (KEFLEX) 500 MG capsule; Take 2 capsules (1,000 mg total) by mouth every 8 (eight) hours. for 5 days  Dispense: 20 capsule; Refill: 0  -     gabapentin (NEURONTIN)  300 MG capsule; Take 1 capsule (300 mg total) by mouth every evening.  Dispense: 90 capsule; Refill: 3    Other orders  -     carvedilol (COREG) 6.25 MG tablet; Take 1 tablet (6.25 mg total) by mouth 2 (two) times daily with meals.  Dispense: 60 tablet; Refill: 3      Patient readiness: acceptance and barriers:readiness    During the course of the visit the patient was educated and counseled about the following:     Hypertension:   Dietary sodium restriction.  Regular aerobic exercise.  Check blood pressures daily and record.  Follow up: 6 weeks and as needed.    Goals: Hypertension: Reduce Blood Pressure    Did patient meet goals/outcomes: no    The following self management tools provided: blood pressure log  excercise log    Patient Instructions (the written plan) was given to the patient/family.     Time spent with patient: 45 minutes    Barriers to medications present (no )    Adverse reactions to current medications (yes)    Over the counter medications reviewed (Yes)    Daughter is present during the interview.    45 minutes spent counseling patient on diet, exercise, and weight loss.  This has been fully explained to the patient, who indicates understanding.

## 2018-10-19 NOTE — PATIENT INSTRUCTIONS
Low-Salt Diet  This diet removes foods that are high in salt. It also limits the amount of salt you use when cooking. It is most often used for people with high blood pressure, edema (fluid retention), and kidney, liver, or heart disease.  Table salt contains the mineral sodium. Your body needs sodium to work normally. But too much sodium can make your health problems worse. Your healthcare provider is recommending a low-salt (also called low-sodium) diet for you. Your total daily allowance of salt is 1,500 to 2,300 milligrams (mg). It is less than 1 teaspoon of table salt. This means you can have only about 500 to 700 mg of sodium at each meal. People with certain health problems should limit salt intake to the lower end of the recommended range.    When you cook, dont add much salt. If you can cook without using salt, even better. Dont add salt to your food at the table.  When shopping, read food labels. Salt is often called sodium on the label. Choose foods that are salt-free, low salt, or very low salt. Note that foods with reduced salt may not lower your salt intake enough.    Beans, potatoes, and pasta  Ok: Dry beans, split peas, lentils, potatoes, rice, macaroni, pasta, spaghetti without added salt  Avoid: Potato chips, tortilla chips, and similar products  Breads and cereals  Ok: Low-sodium breads, rolls, cereals, and cakes; low-salt crackers, matzo crackers  Avoid: Salted crackers, pretzels, popcorn, Hebrew toast, pancakes, muffins  Dairy  Ok: Milk, chocolate milk, hot chocolate mix, low-salt cheeses, and yogurt  Avoid: Processed cheese and cheese spreads; Roquefort, Camembert, and cottage cheese; buttermilk, instant breakfast drink  Desserts  Ok: Ice cream, frozen yogurt, juice bars, gelatin, cookies and pies, sugar, honey, jelly, hard candy  Avoid: Most pies, cakes and cookies prepared or processed with salt; instant pudding  Drinks  Ok: Tea, coffee, fizzy (carbonated) drinks, juices  Avoid: Flavored  coffees, electrolyte replacement drinks, sports drinks  Meats  Ok: All fresh meat, fish, poultry, low-salt tuna, eggs, egg substitute  Avoid: Smoked, pickled, brine-cured, or salted meats and fish. This includes hoyt, chipped beef, corned beef, hot dogs, deli meats, ham, kosher meats, salt pork, sausage, canned tuna, salted codfish, smoked salmon, herring, sardines, or anchovies.  Seasonings and spices  Ok: Most seasonings are okay. Good substitutes for salt include: fresh herb blends, hot sauce, lemon, garlic, soto, vinegar, dry mustard, parsley, cilantro, horseradish, tomato paste, regular margarine, mayonnaise, unsalted butter, cream cheese, vegetable oil, cream, low-salt salad dressing and gravy.  Avoid: Regular ketchup, relishes, pickles, soy sauce, teriyaki sauce, Worcestershire sauce, BBQ sauce, tartar sauce, meat tenderizer, chili sauce, regular gravy, regular salad dressing, salted butter  Soups  Ok: Low-salt soups and broths made with allowed foods  Avoid: Bouillon cubes, soups with smoked or salted meats, regular soup and broth  Vegetables  Ok: Most vegetables are okay; also low-salt tomato and vegetable juices  Avoid: Sauerkraut and other brine-soaked vegetables; pickles and other pickled vegetables; tomato juice, olives  Date Last Reviewed: 8/1/2016 © 2000-2017 [employer]. [address] [place] [address]. All rights reserved. This information is not intended as a substitute for professional medical care. Always follow your healthcare professional's instructions.        Nosebleed (Adult)    Bleeding from the nose most commonly occurs because of injury or drying and cracking of the inner lining of the nose. Most nosebleeds are because of dry air or nose-picking. They can occur during a common cold or an allergy attack. They can also occur on a very hot day, or from dry air in the winter.  If the bleeding site is found, it may be cauterized. This means it is treated to cause a  blood clot to form. This may be done with a chemical, heat, or electricity. If the bleeding continues after the site is cauterized, or if the site cannot be found, packing may be put in your nose. This is to apply pressure and stop the bleeding. The packing may be made of gauze or sponge. A small balloon catheter is sometimes used. These must be removed by your doctor. Some types of packing dissolve on their own.  Home care  · If packing was put in your nose, unless told otherwise, do not pull on it or try to remove it yourself. You will be given an appointment to have it removed. You may also have been given antibiotics to prevent a sinus infection. If so, finish all of the medicine.  · Do not blow your nose for 12 hours after the bleeding stops. This will allow a strong blood clot to form. Do not pick your nose. This may restart bleeding.  · Avoid drinking alcohol and hot liquids for the next 2 days. Alcohol or hot liquids in your mouth can dilate blood vessels in your nose. This can cause bleeding to start again.  · Do not take ibuprofen, naproxen, or medicines that contain aspirin. These thin the blood and may cause your nose to bleed. You may take acetaminophen for pain, unless another pain medicine was prescribed.  · If the bleeding starts again, sit up and lean forward to prevent swallowing blood. Pinch your nose tightly on both sides, as shown above, for 10 to 15 minutes. Time yourself. Dont release the pressure on your nose until 10 minutes is up. If bleeding does not stop, continue to pinch your nose and call your healthcare provider or return to this facility.  · If you have a cold, allergies, or dry nasal membranes, lubricate the nasal passages. Apply a small amount of petroleum jelly inside the nose with a cotton swab twice a day (morning and night).  · Avoid overheating your home. This can dry the air and make your condition worse.  · Put a humidifier in the room where you sleep. This will add  moisture to the air.  · Use a saline nasal spray to keep nasal passages moist.  · Do not pick your nose. Keep fingernails trimmed to decrease risk of bleeds.  · Do not smoke.  Follow-up care  Follow up with your healthcare provider, or as advised. Nasal packing should be rechecked or removed within 2 to 3 days.  When to seek medical advice  Call your healthcare provider right away if any of these occur.  · You have another nosebleed that you cannot control  · Dizziness, weakness, or fainting  · You become tired or confused  · Fever of 100.4ºF (38ºC) or higher, or as directed by your healthcare provider  · Headache  · Sinus or facial pain  · Shortness of breath or trouble breathing  Date Last Reviewed: 3/22/2015  © 0702-3151 [employer]. [address] [place] [address], [place] [address]. All rights reserved. This information is not intended as a substitute for professional medical care. Always follow your healthcare professional's instructions.        Head Injury (Adult)    You have a head injury. It does not appear serious at this time. But symptoms of a more serious problem, such as a mild brain injury (concussion) or bruising or bleeding in the brain, may appear later. For this reason, you or someone caring for you will need to watch for the symptoms listed below. Once youre home, also be sure to follow any care instructions youre given.  Home care  Watch for the following symptoms  Seek emergency medical care if you have any of these symptoms over the next hours to days:   · Headache  · Nausea or vomiting  · Dizziness  · Sensitivity to light or noise  · Unusual sleepiness or grogginess  · Trouble falling asleep  · Personality changes  · Vision changes  · Memory loss  · Confusion  · Trouble walking or clumsiness  · Loss of consciousness (even for a short time)  · Inability to be awakened  · Stiff neck  · Weakness or numbness in any part of the body  · Seizures  General care  · If you were prescribed  medicines for pain, use them as directed. Note: Dont take other medicines for pain without talking to your provider first.  · To help reduce swelling and pain, apply a cold source to the injured area for up to 20 minutes at a time. Do this as often as directed. Use a cold pack or bag of ice wrapped in a thin towel. Never apply a cold source directly to the skin.  · If you have cuts or scrapes as a result of your head injury, care for them as directed.  · For the next 24 hours (or longer, if instructed):  ¨ Dont drink alcohol or use sedatives or other medicines that make you sleepy.  ¨ Dont drive or operate machinery.  ¨ Dont do anything strenuous, such as heavy lifting or straining.  ¨ Limit tasks that require concentration. This includes reading, using a smartphone or computer, watching TV, and playing video games.  ¨ Dont return to sports or other activities that could result in another head injury.  Follow-up care  Follow up with your healthcare provider, or as directed. If imaging tests were done, they will be reviewed by a doctor. You will be told the results and any new findings that may affect your care.  When to seek medical advice  Call your healthcare provider right away if any of these occur:  · Pain doesnt get better or worsens  · New or increased swelling or bruising  · Fever of 100.4°F (38°C) or higher, or as directed by your provider  · Increased redness, warmth, drainage, or bleeding from the injured area  · Fluid drainage or bleeding from the nose or ears  · Any depression or bony abnormality in the injured area  Date Last Reviewed: 9/26/2015 © 2000-2017 [employer]. [address] [place] [address], [place] [address]. All rights reserved. This information is not intended as a substitute for professional medical care. Always follow your healthcare professional's instructions.

## 2018-10-21 ENCOUNTER — HOSPITAL ENCOUNTER (INPATIENT)
Facility: HOSPITAL | Age: 78
LOS: 1 days | Discharge: HOME | DRG: 392 | End: 2018-10-22
Attending: EMERGENCY MEDICINE | Admitting: INTERNAL MEDICINE
Payer: MEDICARE

## 2018-10-21 DIAGNOSIS — K52.9 ENTERITIS: Primary | ICD-10-CM

## 2018-10-21 DIAGNOSIS — R53.83 FATIGUE, UNSPECIFIED TYPE: ICD-10-CM

## 2018-10-21 DIAGNOSIS — M47.812 SPONDYLOSIS OF CERVICAL REGION WITHOUT MYELOPATHY OR RADICULOPATHY: ICD-10-CM

## 2018-10-21 PROBLEM — N18.5 CRF (CHRONIC RENAL FAILURE), STAGE 5: Status: ACTIVE | Noted: 2018-10-21

## 2018-10-21 LAB
ALBUMIN SERPL BCP-MCNC: 4 G/DL
ALP SERPL-CCNC: 93 U/L
ALT SERPL W/O P-5'-P-CCNC: 24 U/L
ANION GAP SERPL CALC-SCNC: 13 MMOL/L
AST SERPL-CCNC: 32 U/L
BACTERIA #/AREA URNS HPF: NORMAL /HPF
BASOPHILS # BLD AUTO: 0.1 K/UL
BASOPHILS NFR BLD: 0.5 %
BILIRUB SERPL-MCNC: 0.4 MG/DL
BILIRUB UR QL STRIP: NEGATIVE
BUN SERPL-MCNC: 81 MG/DL
CALCIUM SERPL-MCNC: 8.3 MG/DL
CHLORIDE SERPL-SCNC: 102 MMOL/L
CLARITY UR: CLEAR
CO2 SERPL-SCNC: 20 MMOL/L
COLOR UR: YELLOW
CREAT SERPL-MCNC: 5.6 MG/DL
DIFFERENTIAL METHOD: ABNORMAL
EOSINOPHIL # BLD AUTO: 0.2 K/UL
EOSINOPHIL NFR BLD: 2.2 %
ERYTHROCYTE [DISTWIDTH] IN BLOOD BY AUTOMATED COUNT: 15.6 %
EST. GFR  (AFRICAN AMERICAN): 10 ML/MIN/1.73 M^2
EST. GFR  (NON AFRICAN AMERICAN): 9 ML/MIN/1.73 M^2
GLUCOSE SERPL-MCNC: 77 MG/DL
GLUCOSE UR QL STRIP: NEGATIVE
HCT VFR BLD AUTO: 34 %
HGB BLD-MCNC: 11.2 G/DL
HGB UR QL STRIP: ABNORMAL
HYALINE CASTS #/AREA URNS LPF: 0 /LPF
KETONES UR QL STRIP: NEGATIVE
LEUKOCYTE ESTERASE UR QL STRIP: NEGATIVE
LIPASE SERPL-CCNC: 71 U/L
LYMPHOCYTES # BLD AUTO: 2.5 K/UL
LYMPHOCYTES NFR BLD: 25.9 %
MAGNESIUM SERPL-MCNC: 2.5 MG/DL
MCH RBC QN AUTO: 30.3 PG
MCHC RBC AUTO-ENTMCNC: 33 G/DL
MCV RBC AUTO: 92 FL
MICROSCOPIC COMMENT: NORMAL
MONOCYTES # BLD AUTO: 0.9 K/UL
MONOCYTES NFR BLD: 9.3 %
NEUTROPHILS # BLD AUTO: 6 K/UL
NEUTROPHILS NFR BLD: 62.1 %
NITRITE UR QL STRIP: NEGATIVE
PH UR STRIP: 6 [PH] (ref 5–8)
PHOSPHATE SERPL-MCNC: 4.2 MG/DL
PLATELET # BLD AUTO: 257 K/UL
PMV BLD AUTO: 7.4 FL
POTASSIUM SERPL-SCNC: 4.1 MMOL/L
PROT SERPL-MCNC: 7.1 G/DL
PROT UR QL STRIP: ABNORMAL
RBC # BLD AUTO: 3.71 M/UL
RBC #/AREA URNS HPF: 3 /HPF (ref 0–4)
SODIUM SERPL-SCNC: 135 MMOL/L
SP GR UR STRIP: 1.02 (ref 1–1.03)
SQUAMOUS #/AREA URNS HPF: 1 /HPF
URN SPEC COLLECT METH UR: ABNORMAL
UROBILINOGEN UR STRIP-ACNC: NEGATIVE EU/DL
WBC # BLD AUTO: 9.7 K/UL
WBC #/AREA URNS HPF: 1 /HPF (ref 0–5)

## 2018-10-21 PROCEDURE — 83690 ASSAY OF LIPASE: CPT

## 2018-10-21 PROCEDURE — 80053 COMPREHEN METABOLIC PANEL: CPT

## 2018-10-21 PROCEDURE — 81000 URINALYSIS NONAUTO W/SCOPE: CPT

## 2018-10-21 PROCEDURE — 63600175 PHARM REV CODE 636 W HCPCS: Performed by: INTERNAL MEDICINE

## 2018-10-21 PROCEDURE — 36415 COLL VENOUS BLD VENIPUNCTURE: CPT

## 2018-10-21 PROCEDURE — 86706 HEP B SURFACE ANTIBODY: CPT

## 2018-10-21 PROCEDURE — 12000002 HC ACUTE/MED SURGE SEMI-PRIVATE ROOM

## 2018-10-21 PROCEDURE — 25000003 PHARM REV CODE 250: Performed by: INTERNAL MEDICINE

## 2018-10-21 PROCEDURE — 85025 COMPLETE CBC W/AUTO DIFF WBC: CPT

## 2018-10-21 PROCEDURE — 96361 HYDRATE IV INFUSION ADD-ON: CPT

## 2018-10-21 PROCEDURE — 87340 HEPATITIS B SURFACE AG IA: CPT

## 2018-10-21 PROCEDURE — 25000003 PHARM REV CODE 250: Performed by: EMERGENCY MEDICINE

## 2018-10-21 PROCEDURE — 93005 ELECTROCARDIOGRAM TRACING: CPT

## 2018-10-21 PROCEDURE — 86704 HEP B CORE ANTIBODY TOTAL: CPT

## 2018-10-21 PROCEDURE — 99285 EMERGENCY DEPT VISIT HI MDM: CPT | Mod: 25

## 2018-10-21 PROCEDURE — 80074 ACUTE HEPATITIS PANEL: CPT

## 2018-10-21 PROCEDURE — 94761 N-INVAS EAR/PLS OXIMETRY MLT: CPT

## 2018-10-21 PROCEDURE — 96374 THER/PROPH/DIAG INJ IV PUSH: CPT

## 2018-10-21 PROCEDURE — 84100 ASSAY OF PHOSPHORUS: CPT

## 2018-10-21 PROCEDURE — 93010 ELECTROCARDIOGRAM REPORT: CPT | Mod: ,,, | Performed by: INTERNAL MEDICINE

## 2018-10-21 PROCEDURE — 63600175 PHARM REV CODE 636 W HCPCS: Performed by: EMERGENCY MEDICINE

## 2018-10-21 PROCEDURE — 83735 ASSAY OF MAGNESIUM: CPT

## 2018-10-21 PROCEDURE — 99900035 HC TECH TIME PER 15 MIN (STAT)

## 2018-10-21 RX ORDER — MECLIZINE HYDROCHLORIDE 25 MG/1
25 TABLET ORAL 3 TIMES DAILY PRN
Status: DISCONTINUED | OUTPATIENT
Start: 2018-10-21 | End: 2018-10-22 | Stop reason: HOSPADM

## 2018-10-21 RX ORDER — ALBUTEROL SULFATE 90 UG/1
2 AEROSOL, METERED RESPIRATORY (INHALATION) EVERY 4 HOURS PRN
Status: DISCONTINUED | OUTPATIENT
Start: 2018-10-21 | End: 2018-10-21

## 2018-10-21 RX ORDER — SODIUM CHLORIDE 9 MG/ML
INJECTION, SOLUTION INTRAVENOUS CONTINUOUS
Status: DISCONTINUED | OUTPATIENT
Start: 2018-10-21 | End: 2018-10-22

## 2018-10-21 RX ORDER — ALLOPURINOL 100 MG/1
100 TABLET ORAL DAILY
Status: DISCONTINUED | OUTPATIENT
Start: 2018-10-21 | End: 2018-10-22 | Stop reason: HOSPADM

## 2018-10-21 RX ORDER — ONDANSETRON 2 MG/ML
4 INJECTION INTRAMUSCULAR; INTRAVENOUS EVERY 8 HOURS PRN
Status: DISCONTINUED | OUTPATIENT
Start: 2018-10-21 | End: 2018-10-21

## 2018-10-21 RX ORDER — IPRATROPIUM BROMIDE AND ALBUTEROL SULFATE 2.5; .5 MG/3ML; MG/3ML
3 SOLUTION RESPIRATORY (INHALATION) EVERY 6 HOURS
Status: DISCONTINUED | OUTPATIENT
Start: 2018-10-21 | End: 2018-10-22 | Stop reason: HOSPADM

## 2018-10-21 RX ORDER — SODIUM CHLORIDE 9 MG/ML
INJECTION, SOLUTION INTRAVENOUS
Status: DISCONTINUED | OUTPATIENT
Start: 2018-10-21 | End: 2018-10-22 | Stop reason: HOSPADM

## 2018-10-21 RX ORDER — ATORVASTATIN CALCIUM 40 MG/1
80 TABLET, FILM COATED ORAL DAILY
Status: DISCONTINUED | OUTPATIENT
Start: 2018-10-21 | End: 2018-10-22 | Stop reason: HOSPADM

## 2018-10-21 RX ORDER — POTASSIUM CHLORIDE 20 MEQ/15ML
40 SOLUTION ORAL
Status: DISCONTINUED | OUTPATIENT
Start: 2018-10-21 | End: 2018-10-22 | Stop reason: HOSPADM

## 2018-10-21 RX ORDER — CARVEDILOL 6.25 MG/1
6.25 TABLET ORAL 2 TIMES DAILY WITH MEALS
Status: DISCONTINUED | OUTPATIENT
Start: 2018-10-21 | End: 2018-10-22 | Stop reason: HOSPADM

## 2018-10-21 RX ORDER — CYCLOBENZAPRINE HCL 5 MG
5 TABLET ORAL 3 TIMES DAILY PRN
Status: DISCONTINUED | OUTPATIENT
Start: 2018-10-21 | End: 2018-10-22 | Stop reason: HOSPADM

## 2018-10-21 RX ORDER — GABAPENTIN 300 MG/1
300 CAPSULE ORAL NIGHTLY
Status: DISCONTINUED | OUTPATIENT
Start: 2018-10-21 | End: 2018-10-22 | Stop reason: HOSPADM

## 2018-10-21 RX ORDER — AMOXICILLIN 250 MG
1 CAPSULE ORAL 2 TIMES DAILY
Status: DISCONTINUED | OUTPATIENT
Start: 2018-10-21 | End: 2018-10-22 | Stop reason: HOSPADM

## 2018-10-21 RX ORDER — ACETAMINOPHEN 325 MG/1
650 TABLET ORAL EVERY 6 HOURS PRN
Status: DISCONTINUED | OUTPATIENT
Start: 2018-10-21 | End: 2018-10-21

## 2018-10-21 RX ORDER — ACETAMINOPHEN 500 MG
1000 TABLET ORAL EVERY 6 HOURS PRN
Status: DISCONTINUED | OUTPATIENT
Start: 2018-10-21 | End: 2018-10-22 | Stop reason: HOSPADM

## 2018-10-21 RX ORDER — FLUTICASONE PROPIONATE 50 MCG
2 SPRAY, SUSPENSION (ML) NASAL DAILY
Status: DISCONTINUED | OUTPATIENT
Start: 2018-10-21 | End: 2018-10-22 | Stop reason: HOSPADM

## 2018-10-21 RX ORDER — SODIUM CHLORIDE 9 MG/ML
INJECTION, SOLUTION INTRAVENOUS ONCE
Status: DISCONTINUED | OUTPATIENT
Start: 2018-10-21 | End: 2018-10-22 | Stop reason: HOSPADM

## 2018-10-21 RX ORDER — AMOXICILLIN 250 MG
1 CAPSULE ORAL 2 TIMES DAILY PRN
Status: DISCONTINUED | OUTPATIENT
Start: 2018-10-21 | End: 2018-10-22 | Stop reason: HOSPADM

## 2018-10-21 RX ORDER — IPRATROPIUM BROMIDE AND ALBUTEROL SULFATE 2.5; .5 MG/3ML; MG/3ML
3 SOLUTION RESPIRATORY (INHALATION) EVERY 6 HOURS
Status: DISCONTINUED | OUTPATIENT
Start: 2018-10-21 | End: 2018-10-21

## 2018-10-21 RX ORDER — SODIUM CHLORIDE 0.9 % (FLUSH) 0.9 %
5 SYRINGE (ML) INJECTION
Status: DISCONTINUED | OUTPATIENT
Start: 2018-10-21 | End: 2018-10-22 | Stop reason: HOSPADM

## 2018-10-21 RX ORDER — OXYCODONE AND ACETAMINOPHEN 5; 325 MG/1; MG/1
1 TABLET ORAL EVERY 6 HOURS PRN
Status: DISCONTINUED | OUTPATIENT
Start: 2018-10-21 | End: 2018-10-22 | Stop reason: HOSPADM

## 2018-10-21 RX ORDER — FINASTERIDE 5 MG/1
5 TABLET, FILM COATED ORAL DAILY
Status: DISCONTINUED | OUTPATIENT
Start: 2018-10-21 | End: 2018-10-22 | Stop reason: HOSPADM

## 2018-10-21 RX ORDER — HEPARIN SODIUM 5000 [USP'U]/ML
5000 INJECTION, SOLUTION INTRAVENOUS; SUBCUTANEOUS EVERY 12 HOURS
Status: DISCONTINUED | OUTPATIENT
Start: 2018-10-21 | End: 2018-10-22 | Stop reason: HOSPADM

## 2018-10-21 RX ORDER — ASPIRIN 81 MG/1
81 TABLET ORAL DAILY
Status: DISCONTINUED | OUTPATIENT
Start: 2018-10-21 | End: 2018-10-22 | Stop reason: HOSPADM

## 2018-10-21 RX ORDER — MONTELUKAST SODIUM 10 MG/1
10 TABLET ORAL NIGHTLY
Status: DISCONTINUED | OUTPATIENT
Start: 2018-10-21 | End: 2018-10-22 | Stop reason: HOSPADM

## 2018-10-21 RX ORDER — LOPERAMIDE HYDROCHLORIDE 2 MG/1
4 CAPSULE ORAL
Status: COMPLETED | OUTPATIENT
Start: 2018-10-21 | End: 2018-10-21

## 2018-10-21 RX ORDER — ONDANSETRON 2 MG/ML
4 INJECTION INTRAMUSCULAR; INTRAVENOUS
Status: COMPLETED | OUTPATIENT
Start: 2018-10-21 | End: 2018-10-21

## 2018-10-21 RX ORDER — ONDANSETRON 2 MG/ML
8 INJECTION INTRAMUSCULAR; INTRAVENOUS EVERY 8 HOURS PRN
Status: DISCONTINUED | OUTPATIENT
Start: 2018-10-21 | End: 2018-10-22 | Stop reason: HOSPADM

## 2018-10-21 RX ORDER — LANOLIN ALCOHOL/MO/W.PET/CERES
800 CREAM (GRAM) TOPICAL
Status: DISCONTINUED | OUTPATIENT
Start: 2018-10-21 | End: 2018-10-22 | Stop reason: HOSPADM

## 2018-10-21 RX ORDER — AMLODIPINE BESYLATE 5 MG/1
10 TABLET ORAL NIGHTLY
Status: DISCONTINUED | OUTPATIENT
Start: 2018-10-21 | End: 2018-10-22 | Stop reason: HOSPADM

## 2018-10-21 RX ORDER — ALBUTEROL SULFATE 2.5 MG/.5ML
2.5 SOLUTION RESPIRATORY (INHALATION) EVERY 4 HOURS PRN
Status: DISCONTINUED | OUTPATIENT
Start: 2018-10-21 | End: 2018-10-22 | Stop reason: HOSPADM

## 2018-10-21 RX ORDER — PREDNISONE 20 MG/1
40 TABLET ORAL DAILY
Status: DISCONTINUED | OUTPATIENT
Start: 2018-10-21 | End: 2018-10-22 | Stop reason: HOSPADM

## 2018-10-21 RX ORDER — ONDANSETRON 2 MG/ML
INJECTION INTRAMUSCULAR; INTRAVENOUS
Status: DISPENSED
Start: 2018-10-21 | End: 2018-10-21

## 2018-10-21 RX ORDER — ISOSORBIDE MONONITRATE 10 MG/1
10 TABLET ORAL NIGHTLY
Status: DISCONTINUED | OUTPATIENT
Start: 2018-10-21 | End: 2018-10-22 | Stop reason: HOSPADM

## 2018-10-21 RX ORDER — PANTOPRAZOLE SODIUM 40 MG/1
40 TABLET, DELAYED RELEASE ORAL DAILY
Status: DISCONTINUED | OUTPATIENT
Start: 2018-10-21 | End: 2018-10-21

## 2018-10-21 RX ORDER — PANTOPRAZOLE SODIUM 40 MG/1
40 TABLET, DELAYED RELEASE ORAL DAILY
Status: DISCONTINUED | OUTPATIENT
Start: 2018-10-21 | End: 2018-10-22 | Stop reason: HOSPADM

## 2018-10-21 RX ORDER — NITROGLYCERIN 0.4 MG/1
0.4 TABLET SUBLINGUAL EVERY 5 MIN PRN
Status: DISCONTINUED | OUTPATIENT
Start: 2018-10-21 | End: 2018-10-22 | Stop reason: HOSPADM

## 2018-10-21 RX ORDER — ZOLPIDEM TARTRATE 5 MG/1
5 TABLET ORAL NIGHTLY PRN
Status: DISCONTINUED | OUTPATIENT
Start: 2018-10-21 | End: 2018-10-22 | Stop reason: HOSPADM

## 2018-10-21 RX ADMIN — MONTELUKAST SODIUM 10 MG: 10 TABLET, FILM COATED ORAL at 09:10

## 2018-10-21 RX ADMIN — AMLODIPINE BESYLATE 10 MG: 5 TABLET ORAL at 09:10

## 2018-10-21 RX ADMIN — PANTOPRAZOLE SODIUM 40 MG: 40 TABLET, DELAYED RELEASE ORAL at 05:10

## 2018-10-21 RX ADMIN — ATORVASTATIN CALCIUM 80 MG: 40 TABLET, FILM COATED ORAL at 05:10

## 2018-10-21 RX ADMIN — PREDNISONE 40 MG: 20 TABLET ORAL at 05:10

## 2018-10-21 RX ADMIN — LOPERAMIDE HYDROCHLORIDE 4 MG: 2 CAPSULE ORAL at 01:10

## 2018-10-21 RX ADMIN — HEPARIN SODIUM 5000 UNITS: 5000 INJECTION, SOLUTION INTRAVENOUS; SUBCUTANEOUS at 09:10

## 2018-10-21 RX ADMIN — ALLOPURINOL 100 MG: 100 TABLET ORAL at 05:10

## 2018-10-21 RX ADMIN — FINASTERIDE 5 MG: 5 TABLET, FILM COATED ORAL at 05:10

## 2018-10-21 RX ADMIN — ONDANSETRON 4 MG: 2 INJECTION INTRAMUSCULAR; INTRAVENOUS at 10:10

## 2018-10-21 RX ADMIN — SODIUM CHLORIDE 1000 ML: 0.9 INJECTION, SOLUTION INTRAVENOUS at 11:10

## 2018-10-21 RX ADMIN — CARVEDILOL 6.25 MG: 6.25 TABLET, FILM COATED ORAL at 05:10

## 2018-10-21 RX ADMIN — SODIUM CHLORIDE: 0.9 INJECTION, SOLUTION INTRAVENOUS at 06:10

## 2018-10-21 RX ADMIN — GABAPENTIN 300 MG: 300 CAPSULE ORAL at 09:10

## 2018-10-21 RX ADMIN — ISOSORBIDE MONONITRATE 10 MG: 10 TABLET ORAL at 09:10

## 2018-10-21 NOTE — [facility] SECONDARY REVIEW
Physician Advisor External    Level of Care Issue    Approved Inpatient for admit 10/21/18 per dr montes, klarissa quijano reviewer.

## 2018-10-21 NOTE — CONSULTS
"Consult Note  Nephrology    Consult Requested By: Rai Ng MD    Reason for Consult: new start hemodialysis    SUBJECTIVE:     History of Present Illness:  79 y/o male patient known to our practice, sees Dr. Rojo in the office setting.  Pt reports that Dr. Rojo has been talking to him about the possibility of needing to start dialysis soon.  He has had N/V x 5 days, started having diarrhea yesterday.  Fell last week, "tripped over a wire",  facial lacerations and bruising noted.  He called Dr. RICARDO this am and told him he was ready to start dialysis and presented to the ER.  He has a L arm AVF placed about 6 mos ago, +thrill and bruit.  Renal is consulted for co-management and new start HD.    Assessment/plan:    1.  CKD V--plan is to initiate HD, awaiting labs.  Ordered Hepatitis B studies in anticipation of following up in out patient unit.  2.  HTN-- monitor for hypotension w/start of HD  3.  Anemia of chronic disease--monitor, give epo w/HD if indicated  4.  N/V, diarrhea  Can have zofran or phenergan  5.  Gout--continue home meds    Past Medical History:   Diagnosis Date    NICK (acute kidney injury) 7/29/2016    Allergy     Anemia, mild 12/15/2014    Arthritis     Gout    Benign essential HTN 3/27/2012    BMI 29.0-29.9,adult 5/10/2018    BPH (benign prostatic hyperplasia)     CAD (coronary artery disease) 2006    Chronic kidney disease     due to ibuprofen    Colon polyp     Diverticulosis     Gastritis     GERD (gastroesophageal reflux disease)     History of colon polyps 5/3/2018    HTN (hypertension) 3/27/2012    Hyperlipidemia     LLL pneumonia 6/14/2018    LVH (left ventricular hypertrophy)     Mesenteric ischemia     Murmur, cardiac 3/27/2012    GRICELDA (obstructive sleep apnea)     DOES NOT USE A MACHINE    Sinus problem     Syncope and collapse      Past Surgical History:   Procedure Laterality Date    APPENDECTOMY      BLOCK-NERVE Left 5/31/2016    Performed by Kevin Leon, " [facility] at [facility]    CARDIAC CATHETERIZATION      COLONOSCOPY      COLONOSCOPY N/A 5/3/2018    Procedure: COLONOSCOPY;  Surgeon: Messi Harris MD;  Location: [facility];  Service: Endoscopy;  Laterality: N/A;    COLONOSCOPY N/A 5/3/2018    [facility]    COLONOSCOPY N/A 9/10/2015    [facility] at [facility]    CORONARY ARTERY BYPASS GRAFT  4/2007    x 1    CYSTOSCOPY N/A 2017    [facility]    CYSTOSCOPY N/A 11/10/2015    [facility]    ESOPHAGOGASTRODUODENOSCOPY (EGD) N/A 2016    [facility]    ESOPHAGOGASTRODUODENOSCOPY (EGD) N/A 10/15/2014    [facility]    INJECTION-STEROID-EPIDURAL-TRANSFORAMINAL Left 2016    [facility]    JOINT REPLACEMENT      left knee total replacement  X 3    mid leftt finger      from a cactuss    MOLE REMOVAL      RADIOFREQUENCY THERMOCOAGULATION (RFTC)-NERVE-MEDIAN BRANCH-LUMBAR Left 2016    [facility]    rotative cuff      no rotative cuffs on bilat shoulders has pins     SHOULDER SURGERY      shoulder surgery bilat  RIGHT X 4; LEFT X 3    TRANSRECTAL ULTRASOUND GUIDED PROSTATE BIOPSY Bilateral 11/10/2015    [facility] at [facility]    ULTRASOUND-ENDOSCOPIC-UPPER N/A 2017    [facility]    ULTRASOUND-ENDOSCOPIC-UPPER N/A 2016    Per[facility] by Tra [facility] at [facility] ([facility])    ULTRASOUND-ENDOSCOPIC-UPPER N/A 2015    Performed by Jason Saleem [facility] at [facility] ENDO ([facility]ND [facility])     Family History   Problem Relation Age of Onset    Heart disease Mother     Sudden death Father     Cancer Father         advanced lung ca- found after 2 story fall    Stroke Sister     Pneumonia Sister          from PNA     Heart disease Sister     Hypertension Brother     Kidney cancer Neg Hx     Prostate cancer Neg Hx     Urolithiasis Neg Hx     Allergic rhinitis Neg Hx     Allergies Neg Hx     Angioedema Neg Hx     Asthma Neg Hx     Atopy Neg Hx     Eczema Neg Hx     Immunodeficiency Neg Hx     Rhinitis Neg Hx     Urticaria Neg Hx      Social History     Tobacco Use    Smoking status: Never Smoker    Smokeless tobacco: Never Used   Substance Use Topics    Alcohol use: No    Drug use: No       Review of patient's allergies indicates:   Allergen Reactions    Ace inhibitors Other (See Comments)     Cough    Arb-angiotensin receptor antagonist Itching    Eplerenone Other (See Comments)     Marked bradycardia, 40, tiredness and weakness      Sulfa (sulfonamide antibiotics) Itching     Patient says this was 10 years ago and doesn't remember what happened        Review of Systems:  General ROS: negative for - fever or night sweats  Psychological ROS: negative for - behavioral disorder or depression  ENT ROS: negative for - headaches or visual changes  Hematological and Lymphatic ROS: negative for - bleeding problems or bruising  Endocrine ROS: negative for - temperature intolerance or unexpected weight changes  Respiratory ROS: no cough, shortness of breath, or wheezing  Cardiovascular ROS: no chest pain or dyspnea on exertion  Gastrointestinal ROS: no abdominal pain, change in bowel habits, or black or bloody stools  Genito-Urinary ROS: no dysuria, trouble voiding, or hematuria  Musculoskeletal ROS: negative for - joint pain or joint swelling  Neurological ROS: no TIA or stroke symptoms  Dermatological ROS: facial lacerations and bruising    OBJECTIVE:     Vital Signs Range (Last 24H):  Temp:  [97.4 °F (36.3 °C)]   Pulse:  [64]   Resp:  [18]   BP: (133)/(65)   SpO2:  [100 %]     Physical Exam:  General- Patient alert and oriented x3 in NAD  HEENT- WNL  Neck- supple  CV- Regular rate and rhythm, No  Murmur/nikolay/rubs  Resp- Lungs CTA Bilaterally, No increased WOB  GI- Non tender/non-distended, BS normoactive x4 quads  Extrem- No cyanosis, clubbing, edema. Pulses 2+ and symmetric  Derm- No rashes, masses, or lesions noted  Neuro-  No flap. No focal deficits noted.     Body mass index is 27.97 kg/m².    Laboratory:  CBC:   Recent Labs   Lab 10/21/18  1039   WBC 9.70   RBC 3.71*   HGB 11.2*   HCT 34.0*      MCV 92   MCH 30.3   MCHC 33.0     CMP: No results for input(s): GLU, CALCIUM, ALBUMIN, PROT, NA, K, CO2, CL, BUN, CREATININE, ALKPHOS, ALT, AST, BILITOT in the last 168 hours.    Diagnostic Results:  awaiting completion of lab results        ASSESSMENT/PLAN:     There are no hospital problems to display for this patient.        Thank you for allowing us to participate in the care of your patient. We will follow the patient and provide recommendations as needed.      Time spent seeing patient( greater than 1/2 spent in direct contact) :

## 2018-10-21 NOTE — ED PROVIDER NOTES
"Encounter Date: 10/21/2018    SCRIBE #1 NOTE: I, Edna Dempsey, am scribing for, and in the presence of, Dr. Rai Ng.       History     Chief Complaint   Patient presents with    Abdominal Pain     emesis and diarrhea       Time seen by provider: 10:27 AM on 10/21/2018    Micheal Hunt is a 78 y.o. male with PMHx of CKD, NICK, CAD, diverticulosis, HTN, HLD, and gastritis who presents to the ED with complaints of N/V/D and generalized abdominal pain that started yesterday. Patient denies taking nausea medication at home. He mentions having "stage 5 Kidney Cancer" and is presently not on dialysis. Patient denies any sick contact. He denies fever. He reports currently being on antibiotics secondary to a fall that caused a laceration x1 week ago. Patient also complains of being lightheaded and dizzy this morning. Patient has no other medical concerns or complaints at this moment. SHx includes appendectomy, cardiac catheterization, CABG, and colonoscopy.       The history is provided by the patient.     Review of patient's allergies indicates:   Allergen Reactions    Ace inhibitors Other (See Comments)     Cough    Arb-angiotensin receptor antagonist Itching    Eplerenone Other (See Comments)     Marked bradycardia, 40, tiredness and weakness      Sulfa (sulfonamide antibiotics) Itching     Patient says this was 10 years ago and doesn't remember what happened     Past Medical History:   Diagnosis Date    NICK (acute kidney injury) 7/29/2016    Allergy     Anemia, mild 12/15/2014    Arthritis     Gout    Benign essential HTN 3/27/2012    BMI 29.0-29.9,adult 5/10/2018    BPH (benign prostatic hyperplasia)     BPH (benign prostatic hyperplasia)     CAD (coronary artery disease) 2006    Chronic kidney disease     due to ibuprofen    Colon polyp     CRF (chronic renal failure), stage 5     Diverticulosis     Gastritis     GERD (gastroesophageal reflux disease)     Gout     History of colon " polyps 5/3/2018    HTN (hypertension) 3/27/2012    Hyperlipidemia     Hyperlipidemia     LLL pneumonia 6/14/2018    LVH (left ventricular hypertrophy)     Mesenteric ischemia     Murmur, cardiac 3/27/2012    GRICELDA (obstructive sleep apnea)     DOES NOT USE A MACHINE    Sinus problem     Syncope and collapse      Past Surgical History:   Procedure Laterality Date    APPENDECTOMY      BLOCK-NERVE Left 5/31/2016    [facility]    CARDIAC CATHETERIZATION      COLONOSCOPY  2011    COLONOSCOPY N/A 5/3/2018    Procedure: COLONOSCOPY;  Surgeon: Messi Harris MD;  Location: [facility];  Service: Endoscopy;  Laterality: N/A;    COLONOSCOPY N/A 5/3/2018    [facility] at [facility]    COLONOSCOPY N/A 9/10/2015    [facility] at [facility]    CORONARY ARTERY BYPASS GRAFT  4/2007    x 1    CYSTOSCOPY N/A 8/30/2017    [facility] at [facility] OR    CYSTOSCOPY N/A 11/10/2015    [facility] OR    ESOPHAGOGASTRODUODENOSCOPY (EGD) N/A 1/4/2016    Performed by Tra [facility] at [facility] ENDO (2ND FLR)    ESOPHAGOGASTRODUODENOSCOPY (EGD) N/A 10/15/2014    Per[facility] by [facility] at [facility] ENDO    INJECTION-STEROID-EPIDURAL-TRANSFORAMINAL Left 2/25/2016    [facility] OR    JOINT REPLACEMENT      left knee total replacement  X 3    mid leftt finger      from a cactuss    MOLE REMOVAL  2016    RADIOFREQUENCY THERMOCOAGULATION (RFTC)-NERVE-MEDIAN BRANCH-LUMBAR Left 4/11/2016    [facility] at [facility] OR    rotative cuff      no rotative cuffs on bilat shoulders has pins     SHOULDER SURGERY      shoulder surgery bilat  RIGHT X 4; LEFT X 3    TRANSRECTAL ULTRASOUND GUIDED PROSTATE BIOPSY Bilateral 11/10/2015    [facility] at [facility] OR    ULTRASOUND-ENDOSCOPIC-UPPER N/A 5/31/2017    Performed by [facility] at [facility] ENDO  (2ND FLR)    ULTRASOUND-ENDOSCOPIC-UPPER N/A 2016    [facility]formed [facility] at [facility] ENDO (2ND FLR)    ULTRASOUND-ENDOSCOPIC-UPPER N/A 2015    Performed by Jason [facility]em [facility] at [facility] ENDO (2ND FLR)     Family History   Problem Relation Age of Onset    Heart disease Mother     Sudden death Father     Cancer Father         advanced lung ca- found after 2 story fall    Stroke Sister     Pneumonia Sister          from PNA    Heart disease Sister     Hypertension Brother     Kidney cancer Neg Hx     Prostate cancer Neg Hx     Urolithiasis Neg Hx     Allergic rhinitis Neg Hx     Allergies Neg Hx     Angioedema Neg Hx     Asthma Neg Hx     Atopy Neg Hx     Eczema Neg Hx     Immunodeficiency Neg Hx     Rhinitis Neg Hx     Urticaria Neg Hx      Social History     Tobacco Use    Smoking status: Never Smoker    Smokeless tobacco: Never Used   Substance Use Topics    Alcohol use: No    Drug use: No     Review of Systems   Constitutional: Negative for chills and fever.   HENT: Negative for facial swelling and trouble swallowing.    Respiratory: Negative for cough, chest tightness, shortness of breath and wheezing.    Cardiovascular: Negative for chest pain and palpitations.   Gastrointestinal: Positive for abdominal pain, diarrhea, nausea and vomiting.   Genitourinary: Negative for difficulty urinating, dysuria, frequency, hematuria and urgency.   Musculoskeletal: Negative for arthralgias, back pain, joint swelling, myalgias, neck pain and neck stiffness.   Skin: Negative for color change, pallor, rash and wound.   Neurological: Positive for dizziness and light-headedness. Negative for syncope, weakness, numbness and headaches.   Hematological: Does not bruise/bleed easily.   Psychiatric/Behavioral: The patient is not nervous/anxious.        Physical Exam     Initial Vitals [10/21/18 1013]   BP Pulse Resp Temp SpO2   133/65 64 18 97.4 °F (36.3 °C) 100 %      MAP       --          Physical Exam    Nursing note and vitals reviewed.  Constitutional: He appears well-developed and well-nourished. He is not diaphoretic.  Non-toxic appearance. No distress.   HENT:   Head: Normocephalic and atraumatic.   Eyes: EOM are normal. Pupils are equal, round, and reactive to light.   Neck: Normal range of motion. Neck supple. No neck rigidity. No JVD present.   Cardiovascular: Normal rate, regular rhythm, normal heart sounds and intact distal pulses. Exam reveals no gallop and no friction rub.    No murmur heard.  Pulmonary/Chest: Breath sounds normal. He has no wheezes. He has no rhonchi. He has no rales.   Abdominal: Soft. Bowel sounds are normal. He exhibits no distension. There is generalized tenderness. There is no rigidity, no rebound and no guarding.   Diffuse abdominal pain.    Musculoskeletal: Normal range of motion.   AV fistula in LUE with palpable thrill.    Neurological: He is alert and oriented to person, place, and time. He has normal strength and normal reflexes. No cranial nerve deficit or sensory deficit. He exhibits normal muscle tone. Coordination normal. GCS eye subscore is 4. GCS verbal subscore is 5. GCS motor subscore is 6.   No focal neurological deficits noted.  Cranial nerves III-XII grossly intact.  Equal, rapid alternating movements noted to bilateral upper and lower extremities.    Skin: Skin is warm and dry.   Psychiatric: He has a normal mood and affect. His speech is normal and behavior is normal. He is not actively hallucinating.         ED Course   Procedures  Labs Reviewed   CBC W/ AUTO DIFFERENTIAL - Abnormal; Notable for the following components:       Result Value    RBC 3.71 (*)     Hemoglobin 11.2 (*)     Hematocrit 34.0 (*)     RDW 15.6 (*)     MPV 7.4 (*)     All other components within normal limits   COMPREHENSIVE METABOLIC PANEL - Abnormal; Notable for the following components:    Sodium 135 (*)     CO2 20 (*)     BUN, Bld 81 (*)     Creatinine 5.6 (*)      Calcium 8.3 (*)     eGFR if  10 (*)     eGFR if non  9 (*)     All other components within normal limits   LIPASE - Abnormal; Notable for the following components:    Lipase 71 (*)     All other components within normal limits   URINALYSIS, REFLEX TO URINE CULTURE - Abnormal; Notable for the following components:    Protein, UA 2+ (*)     Occult Blood UA 1+ (*)     All other components within normal limits    Narrative:     Preferred Collection Type->Urine, Clean Catch   MAGNESIUM   PHOSPHORUS   URINALYSIS MICROSCOPIC    Narrative:     Preferred Collection Type->Urine, Clean Catch   HEPATITIS B CORE ANTIBODY, TOTAL   HEPATITIS B SURFACE ANTIGEN   HEPATITIS B SURFACE ANTIBODY   HEPATITIS PANEL, ACUTE     EKG Readings: (Independently Interpreted)   Heart Rate: 67 BPM. Ectopy: PACs. Conduction: 1st Degree AV Block. T Waves Flipped: I and AVL. Axis: Left Axis Deviation.   Possible repolarization abnormalities of LVH.        Imaging Results          CT Abdomen Pelvis  Without Contrast (Final result)  Result time 10/21/18 12:24:27    Final result by Luis Alberto Srivastava MD (10/21/18 12:24:27)                 Impression:      1. Loops of small bowel are mildly distended and there are fluid levels.  There is no evidence of bowel obstruction.  These findings are nonspecific but could represent enteritis.  2. The kidneys are somewhat atrophic and there are multiple bilateral renal cysts which are better demonstrated on comparison retroperitoneal ultrasound.  3. Colonic diverticulosis without apparent diverticulitis or abscess.  4. Marked atherosclerosis.  5. Old granulomatous disease.  6. Prostatomegaly, unchanged.      Electronically signed by: Luis Alberto Srivastava MD  Date:    10/21/2018  Time:    12:24             Narrative:    EXAMINATION:  CT ABDOMEN PELVIS WITHOUT CONTRAST    CLINICAL HISTORY:  diffuse abd pain , emesis, diarrhea;    TECHNIQUE:  Routine CT abdomen and pelvis was performed  without intravenous or oral contrast.  Sagittal and coronal reformatted images were created.    COMPARISON:  Retroperitoneal ultrasound dated 09/29/2018, CT abdomen and pelvis without contrast dated 08/17/2018    FINDINGS:  Included CHEST:    There is minimal scarring in the lung bases without focal infiltrate or pleural effusion.  There is mild-to-moderate cardiomegaly.  There are coronary artery calcifications.  These findings were present previously.  There is no pericardial fluid.    ABDOMEN:    Liver: There are scattered granulomas in the liver.  On unenhanced imaging, there is no focal parenchymal abnormality.    Gallbladder/biliary system: There is no biliary ductal dilatation.    Spleen: The spleen is normal.    Pancreas: The pancreas is normal.    Adrenal glands: The adrenal glands are normal.    Kidneys: There are multiple bilateral renal cysts.  These were present on comparison studies.  There is renal cortical thinning but there is no hydronephrosis.    Bowel: There is mild dilatation in the small bowel with fluid levels but without obvious wall thickening.  The findings likely represent nonspecific enteritis.  There are colonic diverticula without associated inflammation to suggest diverticulitis or abscess.    Peritoneum: There is no free intraperitoneal air or fluid.  There is no peritoneal mass.    Abdominal adenopathy: There is no pathologic lymphadenopathy.    Vasculature: There is marked atherosclerosis.    PELVIS:    Bladder: The bladder is incompletely distended.  Wall thickness is suboptimally evaluated.  The prostate gland is enlarged and bulges into the bladder.  This is unchanged.    Pelvic adenopathy: There are no enlarged pelvic lymph nodes.    BONES: There is multilevel degenerative change with osteophyte formation throughout the included thoracic and lumbar spine but there is no acute fracture.    MISCELLANEOUS: There is a very tiny fat containing umbilical hernia.                                  Medical Decision Making:   History:   Old Medical Records: I decided to obtain old medical records.  Initial Assessment:   Patient is 78-year-old man with CKD stage 5 who presents for nausea vomiting diarrhea, and fatigue..  Denies abdominal pain but had abdominal tenderness on examination. CT scan obtained and findings are consistent with enteritis.  No great electrolyte abnormalities.  Patient orthostatic initially.  Given IV fluids with resolution of his orthostasis.  Patient was evaluated by Dr. Cary, nephrology who suggested admission to initiate dialysis.  Discussed with the hospitalist who agrees to admit for initiation of dialysis.  Clinical Tests:   Lab Tests: Reviewed and Ordered  Radiological Study: Ordered and Reviewed  Medical Tests: Reviewed and Ordered            Scribe Attestation:   Scribe #1: I performed the above scribed service and the documentation accurately describes the services I performed. I attest to the accuracy of the note.      I, Hernandez Treviño, personally performed the services described in this documentation. All medical record entries made by the scribe were at my direction and in my presence.  I have reviewed the chart and agree that the record reflects my personal performance and is accurate and complete. aRi Ng MD.  3:48 PM 10/21/2018               Clinical Impression:   The primary encounter diagnosis was Enteritis. A diagnosis of Fatigue, unspecified type was also pertinent to this visit.      Disposition:   Disposition: Discharged  Condition: Stable                        Rai Ng MD  10/21/18 3819

## 2018-10-21 NOTE — SUBJECTIVE & OBJECTIVE
Past Medical History:   Diagnosis Date    NICK (acute kidney injury) 7/29/2016    Allergy     Anemia, mild 12/15/2014    Arthritis     Gout    Benign essential HTN 3/27/2012    BMI 29.0-29.9,adult 5/10/2018    BPH (benign prostatic hyperplasia)     BPH (benign prostatic hyperplasia)     CAD (coronary artery disease) 2006    Chronic kidney disease     due to ibuprofen    Colon polyp     CRF (chronic renal failure), stage 5     Diverticulosis     Gastritis     GERD (gastroesophageal reflux disease)     Gout     History of colon polyps 5/3/2018    HTN (hypertension) 3/27/2012    Hyperlipidemia     Hyperlipidemia     LLL pneumonia 6/14/2018    LVH (left ventricular hypertrophy)     Mesenteric ischemia     Murmur, cardiac 3/27/2012    GRICELDA (obstructive sleep apnea)     DOES NOT USE A MACHINE    Sinus problem     Syncope and collapse        Past Surgical History:   Procedure Laterality Date    APPENDECTOMY      BLOCK-NERVE Left 5/31/2016    [facility]    CARDIAC CATHETERIZATION      COLONOSCOPY  2011    COLONOSCOPY N/A 5/3/2018    Procedure: COLONOSCOPY;  Surgeon: Messi Harris MD;  Location: [facility];  Service: Endoscopy;  Laterality: N/A;    COLONOSCOPY N/A 5/3/2018    [facility] at [facility]    COLONOSCOPY N/A 9/10/2015    [facility] at [facility]    CORONARY ARTERY BYPASS GRAFT  4/2007    x 1    CYSTOSCOPY N/A 8/30/2017    [facility]    CYSTOSCOPY N/A 11/10/2015    [facility]    ESOPHAGOGASTRODUODENOSCOPY (EGD) N/A 1/4/2016    Performed [facility] ENDO (2ND FLR)    ESOPHAGOGASTRODUODENOSCOPY (EGD) N/A 10/15/2014    Per[facility] by [facility] at [facility] ENDO    INJECTION-STEROID-EPIDURAL-TRANSFORAMINAL Left 2/25/2016    [facility] Kvein Leon [facility] at [facility] OR    JOINT REPLACEMENT      left knee total replacement  X  3    mid leftt finger      from a cactuss    MOLE REMOVAL  2016    RADIOFREQUENCY THERMOCOAGULATION (RFTC)-NERVE-MEDIAN BRANCH-LUMBAR Left 4/11/2016    [facility] OR    rotative cuff      no rotative cuffs on bilat shoulders has pins     SHOULDER SURGERY      shoulder surgery bilat  RIGHT X 4; LEFT X 3    TRANSRECTAL ULTRASOUND GUIDED PROSTATE BIOPSY Bilateral 11/10/2015    Per[facility] at [facility] OR    ULTRASOUND-ENDOSCOPIC-UPPER N/A 5/31/2017    Performed by [facility] at [facility] ENDO (2ND FLR)    ULTRASOUND-ENDOSCOPIC-UPPER N/A 1/4/2016    Performed [facility] ENDO (2ND FLR)    ULTRASOUND-ENDOSCOPIC-UPPER N/A 1/16/2015    Performed by Jason Saleem MD at [facility] ENDO (2ND FLR)       Review of patient's allergies indicates:   Allergen Reactions    Ace inhibitors Other (See Comments)     Cough    Arb-angiotensin receptor antagonist Itching    Eplerenone Other (See Comments)     Marked bradycardia, 40, tiredness and weakness      Sulfa (sulfonamide antibiotics) Itching     Patient says this was 10 years ago and doesn't remember what happened       No current facility-administered medications on file prior to encounter.      Current Outpatient Medications on File Prior to Encounter   Medication Sig    albuterol 90 mcg/actuation inhaler 2 puffs every 4 hours as needed for cough, wheeze, or shortness of breath    albuterol-ipratropium (DUO-NEB) 2.5 mg-0.5 mg/3 mL nebulizer solution INHALE 1 VIAL BY NEBULIZER EVERY 6 HOURS AS NEEDED FOR WHEEZING    allopurinol (ZYLOPRIM) 100 MG tablet Take 1 tablet (100 mg total) by mouth once daily.    amLODIPine (NORVASC) 10 MG tablet Take 1 tablet (10 mg total) by mouth every evening.    aspirin (ECOTRIN) 81 MG EC tablet Take 81 mg by mouth once daily.      atorvastatin (LIPITOR) 80 MG tablet TAKE 1 TABLET (80 MG TOTAL) BY MOUTH ONCE DAILY.    carvedilol (COREG) 6.25 MG tablet Take 1 tablet (6.25 mg  total) by mouth 2 (two) times daily with meals.    cyclobenzaprine (FLEXERIL) 5 MG tablet One tab po with breakfast and lunch take 2 tabs po before bedtime for up to 7 days    finasteride (PROSCAR) 5 mg tablet TAKE 1 TABLET ONCE DAILY.    fluticasone (FLONASE) 50 mcg/actuation nasal spray 2 sprays (100 mcg total) by Each Nare route once daily. (Patient taking differently: 2 sprays by Each Nare route daily as needed. )    gabapentin (NEURONTIN) 300 MG capsule Take 1 capsule (300 mg total) by mouth every evening.    iron polysaccharides (NIFEREX) 150 mg iron Cap Take 1 capsule (150 mg total) by mouth 2 (two) times daily before meals.    isosorbide mononitrate (ISMO,MONOKET) 10 mg tablet Take 1 tablet (10 mg total) by mouth every evening.    meclizine (ANTIVERT) 25 mg tablet TAKE 1 TABLET (25 MG TOTAL) BY MOUTH 3 (THREE) TIMES DAILY AS NEEDED.    montelukast (SINGULAIR) 10 mg tablet Take 1 tablet (10 mg total) by mouth every evening.    NITROSTAT 0.4 mg SL tablet PLACE 1 TABLET (0.4 MG TOTAL) UNDER THE TONGUE EVERY 5 (FIVE) MINUTES AS NEEDED FOR CHEST PAIN.    omeprazole (PRILOSEC) 20 MG capsule Take 1 capsule (20 mg total) by mouth once daily.    oxyCODONE-acetaminophen (PERCOCET) 5-325 mg per tablet Take 1 tablet by mouth every 6 (six) hours as needed for Pain.    polyethylene glycol (GLYCOLAX) 17 gram/dose powder Take 17 g by mouth 2 (two) times daily before meals.    predniSONE (DELTASONE) 10 MG tablet Take 1 tablet (10 mg total) by mouth once daily. for 5 days    sodium chloride (SALINE NASAL MIST) 3 % Mist 1 spray by Nasal route 2 (two) times daily.    traMADol (ULTRAM) 50 mg tablet Take 1 tablet (50 mg total) by mouth every 12 (twelve) hours as needed for Pain.    zolpidem (AMBIEN) 5 MG Tab TAKE 1 TABLET (5 MG TOTAL) BY MOUTH NIGHTLY AS NEEDED.    [DISCONTINUED] cephALEXin (KEFLEX) 500 MG capsule Take 2 capsules (1,000 mg total) by mouth every 8 (eight) hours. for 5 days    [DISCONTINUED]  COLCRYS 0.6 mg tablet TAKE 1 TABLET (0.6 MG TOTAL) BY MOUTH ONCE DAILY.    [DISCONTINUED] cyanocobalamin, vitamin B-12, (VITAMIN B-12) 1,000 mcg Subl Take 1 tablet by mouth daily as needed. Takes 3,000mcg     Family History     Problem Relation (Age of Onset)    Cancer Father    Heart disease Mother, Sister    Hypertension Brother    Pneumonia Sister    Stroke Sister    Sudden death Father        Tobacco Use    Smoking status: Never Smoker    Smokeless tobacco: Never Used   Substance and Sexual Activity    Alcohol use: No    Drug use: No    Sexual activity: Not on file     Review of Systems   HENT:        Facial bruising   Gastrointestinal: Positive for abdominal pain, diarrhea, nausea and vomiting.   Musculoskeletal: Negative.         Left big toe pain   Neurological: Positive for dizziness, weakness and light-headedness.     Objective:     Vital Signs (Most Recent):  Temp: 97.4 °F (36.3 °C) (10/21/18 1013)  Pulse: 74 (10/21/18 1401)  Resp: 18 (10/21/18 1013)  BP: (!) 159/76 (10/21/18 1401)  SpO2: 100 % (10/21/18 1401) Vital Signs (24h Range):  Temp:  [97.4 °F (36.3 °C)] 97.4 °F (36.3 °C)  Pulse:  [64-80] 74  Resp:  [18] 18  SpO2:  [91 %-100 %] 100 %  BP: (119-159)/(65-90) 159/76     Weight: 96.2 kg (212 lb)  Body mass index is 27.97 kg/m².    Physical Exam        Significant Labs:   CBC:   Recent Labs   Lab 10/21/18  1039   WBC 9.70   HGB 11.2*   HCT 34.0*        CMP:   Recent Labs   Lab 10/21/18  1039   *   K 4.1      CO2 20*   GLU 77   BUN 81*   CREATININE 5.6*   CALCIUM 8.3*   PROT 7.1   ALBUMIN 4.0   BILITOT 0.4   ALKPHOS 93   AST 32   ALT 24   ANIONGAP 13   EGFRNONAA 9*       Significant Imaging: I have reviewed all pertinent imaging results/findings within the past 24 hours.

## 2018-10-21 NOTE — SUBJECTIVE & OBJECTIVE
Past Medical History:   Diagnosis Date    NICK (acute kidney injury) 7/29/2016    Allergy     Anemia, mild 12/15/2014    Arthritis     Gout    Benign essential HTN 3/27/2012    BMI 29.0-29.9,adult 5/10/2018    BPH (benign prostatic hyperplasia)     BPH (benign prostatic hyperplasia)     CAD (coronary artery disease) 2006    Chronic kidney disease     due to ibuprofen    Colon polyp     CRF (chronic renal failure), stage 5     Diverticulosis     Gastritis     GERD (gastroesophageal reflux disease)     Gout     History of colon polyps 5/3/2018    HTN (hypertension) 3/27/2012    Hyperlipidemia     Hyperlipidemia     LLL pneumonia 6/14/2018    LVH (left ventricular hypertrophy)     Mesenteric ischemia     Murmur, cardiac 3/27/2012    GRICELDA (obstructive sleep apnea)     DOES NOT USE A MACHINE    Sinus problem     Syncope and collapse        Past Surgical History:   Procedure Laterality Date    APPENDECTOMY      BLOCK-NERVE Left 5/31/2016    [facility] by [facility] at [facility] OR    CARDIAC CATHETERIZATION      COLONOSCOPY  2011    COLONOSCOPY N/A 5/3/2018    Procedure: COLONOSCOPY;  Surgeon: Messi Harris MD;  Location: [facility];  Service: Endoscopy;  Laterality: N/A;    COLONOSCOPY N/A 5/3/2018    [facility] at [facility]    COLONOSCOPY N/A 9/10/2015    [facility] at [facility]    CORONARY ARTERY BYPASS GRAFT  4/2007    x 1    CYSTOSCOPY N/A 8/30/2017    [facility] by Rudy [facility]ring [facility] at [facility] OR    CYSTOSCOPY N/A 11/10/2015    [facility]    ESOPHAGOGASTRODUODENOSCOPY (EGD) N/A 1/4/2016    Performed by Tra [facility] at [facility] ENDO (2ND FLR)    ESOPHAGOGASTRODUODENOSCOPY (EGD) N/A 10/15/2014    [facility] by [facility] at [facility] ENDO    INJECTION-STEROID-EPIDURAL-TRANSFORAMINAL Left 2/25/2016    [facility] at [facility] OR    JOINT REPLACEMENT      left knee total replacement  X  3    mid leftt finger      from a cactuss    MOLE REMOVAL  2016    RADIOFREQUENCY THERMOCOAGULATION (RFTC)-NERVE-MEDIAN BRANCH-LUMBAR Left 4/11/2016    Performed by [facility] MD at [facility] OR    rotative cuff      no rotative cuffs on bilat shoulders has pins     SHOULDER SURGERY      shoulder surgery bilat  RIGHT X 4; LEFT X 3    TRANSRECTAL ULTRASOUND GUIDED PROSTATE BIOPSY Bilateral 11/10/2015    [facility]    ULTRASOUND-ENDOSCOPIC-UPPER N/A 5/31/2017    Performed [facility] at [facility] ENDO (2ND FLR)    ULTRASOUND-ENDOSCOPIC-UPPER N/A 1/4/2016    Performed by Tra [facility] at [facility] ENDO (2ND FLR)    ULTRASOUND-ENDOSCOPIC-UPPER N/A 1/16/2015    Performed by Jason Saleem MD at [facility] ENDO (2ND FLR)       Review of patient's allergies indicates:   Allergen Reactions    Ace inhibitors Other (See Comments)     Cough    Arb-angiotensin receptor antagonist Itching    Eplerenone Other (See Comments)     Marked bradycardia, 40, tiredness and weakness      Sulfa (sulfonamide antibiotics) Itching     Patient says this was 10 years ago and doesn't remember what happened       No current facility-administered medications on file prior to encounter.      Current Outpatient Medications on File Prior to Encounter   Medication Sig    albuterol 90 mcg/actuation inhaler 2 puffs every 4 hours as needed for cough, wheeze, or shortness of breath    albuterol-ipratropium (DUO-NEB) 2.5 mg-0.5 mg/3 mL nebulizer solution INHALE 1 VIAL BY NEBULIZER EVERY 6 HOURS AS NEEDED FOR WHEEZING    allopurinol (ZYLOPRIM) 100 MG tablet Take 1 tablet (100 mg total) by mouth once daily.    amLODIPine (NORVASC) 10 MG tablet Take 1 tablet (10 mg total) by mouth every evening.    aspirin (ECOTRIN) 81 MG EC tablet Take 81 mg by mouth once daily.      atorvastatin (LIPITOR) 80 MG tablet TAKE 1 TABLET (80 MG TOTAL) BY MOUTH ONCE DAILY.    carvedilol (COREG) 6.25 MG tablet Take 1 tablet (6.25 mg  total) by mouth 2 (two) times daily with meals.    cyclobenzaprine (FLEXERIL) 5 MG tablet One tab po with breakfast and lunch take 2 tabs po before bedtime for up to 7 days    finasteride (PROSCAR) 5 mg tablet TAKE 1 TABLET ONCE DAILY.    fluticasone (FLONASE) 50 mcg/actuation nasal spray 2 sprays (100 mcg total) by Each Nare route once daily. (Patient taking differently: 2 sprays by Each Nare route daily as needed. )    gabapentin (NEURONTIN) 300 MG capsule Take 1 capsule (300 mg total) by mouth every evening.    iron polysaccharides (NIFEREX) 150 mg iron Cap Take 1 capsule (150 mg total) by mouth 2 (two) times daily before meals.    isosorbide mononitrate (ISMO,MONOKET) 10 mg tablet Take 1 tablet (10 mg total) by mouth every evening.    meclizine (ANTIVERT) 25 mg tablet TAKE 1 TABLET (25 MG TOTAL) BY MOUTH 3 (THREE) TIMES DAILY AS NEEDED.    montelukast (SINGULAIR) 10 mg tablet Take 1 tablet (10 mg total) by mouth every evening.    NITROSTAT 0.4 mg SL tablet PLACE 1 TABLET (0.4 MG TOTAL) UNDER THE TONGUE EVERY 5 (FIVE) MINUTES AS NEEDED FOR CHEST PAIN.    omeprazole (PRILOSEC) 20 MG capsule Take 1 capsule (20 mg total) by mouth once daily.    oxyCODONE-acetaminophen (PERCOCET) 5-325 mg per tablet Take 1 tablet by mouth every 6 (six) hours as needed for Pain.    polyethylene glycol (GLYCOLAX) 17 gram/dose powder Take 17 g by mouth 2 (two) times daily before meals.    predniSONE (DELTASONE) 10 MG tablet Take 1 tablet (10 mg total) by mouth once daily. for 5 days    sodium chloride (SALINE NASAL MIST) 3 % Mist 1 spray by Nasal route 2 (two) times daily.    traMADol (ULTRAM) 50 mg tablet Take 1 tablet (50 mg total) by mouth every 12 (twelve) hours as needed for Pain.    zolpidem (AMBIEN) 5 MG Tab TAKE 1 TABLET (5 MG TOTAL) BY MOUTH NIGHTLY AS NEEDED.    [DISCONTINUED] cephALEXin (KEFLEX) 500 MG capsule Take 2 capsules (1,000 mg total) by mouth every 8 (eight) hours. for 5 days    [DISCONTINUED]  COLCRYS 0.6 mg tablet TAKE 1 TABLET (0.6 MG TOTAL) BY MOUTH ONCE DAILY.    [DISCONTINUED] cyanocobalamin, vitamin B-12, (VITAMIN B-12) 1,000 mcg Subl Take 1 tablet by mouth daily as needed. Takes 3,000mcg     Family History     Problem Relation (Age of Onset)    Cancer Father    Heart disease Mother, Sister    Hypertension Brother    Pneumonia Sister    Stroke Sister    Sudden death Father        Tobacco Use    Smoking status: Never Smoker    Smokeless tobacco: Never Used   Substance and Sexual Activity    Alcohol use: No    Drug use: No    Sexual activity: Not on file     Review of Systems   Gastrointestinal: Positive for abdominal distention, abdominal pain, diarrhea, nausea and vomiting.   Musculoskeletal: Positive for joint swelling.   Neurological: Positive for dizziness, weakness and light-headedness.     Objective:     Vital Signs (Most Recent):  Temp: 97.4 °F (36.3 °C) (10/21/18 1013)  Pulse: 70 (10/21/18 1501)  Resp: 18 (10/21/18 1013)  BP: (!) 154/79 (10/21/18 1501)  SpO2: 99 % (10/21/18 1501) Vital Signs (24h Range):  Temp:  [97.4 °F (36.3 °C)] 97.4 °F (36.3 °C)  Pulse:  [64-80] 70  Resp:  [18] 18  SpO2:  [91 %-100 %] 99 %  BP: (119-159)/(65-90) 154/79     Weight: 96.2 kg (212 lb)  Body mass index is 27.97 kg/m².    Physical Exam   Constitutional: He is oriented to person, place, and time. He appears well-developed and well-nourished.   HENT:   Head: Normocephalic.   Extensive periorbital bruising   Eyes: Conjunctivae and EOM are normal. Pupils are equal, round, and reactive to light.   Neck: Normal range of motion. Neck supple.   Cardiovascular: Normal rate, regular rhythm and intact distal pulses. Exam reveals no gallop and no friction rub.   Murmur heard.  2/6 SM   Pulmonary/Chest: Effort normal and breath sounds normal. No respiratory distress. He has no rales.   Abdominal: Soft. Bowel sounds are normal. He exhibits no distension. There is no tenderness. There is no rebound.   Musculoskeletal:  Normal range of motion. He exhibits no edema.   Left big toe is tender to touch but there is no erythema or swelling   Neurological: He is alert and oriented to person, place, and time. No cranial nerve deficit or sensory deficit. He exhibits normal muscle tone. Coordination normal.   Skin: Skin is warm and dry.   Psychiatric: He has a normal mood and affect. His behavior is normal. Judgment and thought content normal.   Vitals reviewed.        CRANIAL NERVES     CN III, IV, VI   Pupils are equal, round, and reactive to light.  Extraocular motions are normal.        Significant Labs:   CBC:   Recent Labs   Lab 10/21/18  1039   WBC 9.70   HGB 11.2*   HCT 34.0*        CMP:   Recent Labs   Lab 10/21/18  1039   *   K 4.1      CO2 20*   GLU 77   BUN 81*   CREATININE 5.6*   CALCIUM 8.3*   PROT 7.1   ALBUMIN 4.0   BILITOT 0.4   ALKPHOS 93   AST 32   ALT 24   ANIONGAP 13   EGFRNONAA 9*       Significant Imaging: I have reviewed all pertinent imaging results/findings within the past 24 hours.

## 2018-10-21 NOTE — ED NOTES
Pt refusing dialysis at this time. States he's too weak. Plan is to admit and recheck labs tomorrow and re-evaluate for dialysis. Pt aware of plan.

## 2018-10-21 NOTE — H&P
[facility] Medicine  History & Physical    Patient Name: Micheal Hunt  MRN: [id number]  Admission Date: 10/21/2018  Attending Physician: Yakov  Primary Care Provider: Pk Lakhani MD         Patient information was obtained from patient and ER records.     Subjective:     Principal Problem:Enteritis    Chief Complaint:   Chief Complaint   Patient presents with    Abdominal Pain     emesis and diarrhea        HPI: 77 y/o male developed abdominal discomfort, nausea, vomiting and diarrhea yesterday. He denies fever, hematemesis, melena, hematochezia or sick contacts. A CT was obtained which shows changes consistent with enteritis.    The pt also c/o left big toe pain due to gout. He took a few doses of colchicine but doesn't think that caused his abdominal symptoms.  He fell on Tuesday and injured his face. He took Keflex for 5 days.    He has CRI and will start dialysis during this admission.    Past Medical History:   Diagnosis Date    NICK (acute kidney injury) 7/29/2016    Allergy     Anemia, mild 12/15/2014    Arthritis     Gout    Benign essential HTN 3/27/2012    BMI 29.0-29.9,adult 5/10/2018    BPH (benign prostatic hyperplasia)     BPH (benign prostatic hyperplasia)     CAD (coronary artery disease) 2006    Chronic kidney disease     due to ibuprofen    Colon polyp     CRF (chronic renal failure), stage 5     Diverticulosis     Gastritis     GERD (gastroesophageal reflux disease)     Gout     History of colon polyps 5/3/2018    HTN (hypertension) 3/27/2012    Hyperlipidemia     Hyperlipidemia     LLL pneumonia 6/14/2018    LVH (left ventricular hypertrophy)     Mesenteric ischemia     Murmur, cardiac 3/27/2012    GRICELDA (obstructive sleep apnea)     DOES NOT USE A MACHINE    Sinus problem     Syncope and collapse        Past Surgical History:   Procedure Laterality Date    APPENDECTOMY      BLOCK-NERVE Left 5/31/2016    Performed by Kevin Leon,  MD at [facility] OR    CARDIAC CATHETERIZATION      COLONOSCOPY  2011    COLONOSCOPY N/A 5/3/2018    Procedure: COLONOSCOPY;  Surgeon: Messi Harris MD;  Location: [facility];  Service: Endoscopy;  Laterality: N/A;    COLONOSCOPY N/A 5/3/2018    [facility] ENDO    COLONOSCOPY N/A 9/10/2015    [facility]formed by [facility] at [facility]    CORONARY ARTERY BYPASS GRAFT  4/2007    x 1    CYSTOSCOPY N/A 8/30/2017    [facility] at [facility]    CYSTOSCOPY N/A 11/10/2015    [facility] at [facility] OR    ESOPHAGOGASTRODUODENOSCOPY (EGD) N/A 1/4/2016    Performed by Tra [facility] at [facility] (2ND FLR)    ESOPHAGOGASTRODUODENOSCOPY (EGD) N/A 10/15/2014    Performed by [facility] at [facility]    INJECTION-STEROID-EPIDURAL-TRANSFORAMINAL Left 2/25/2016    [facility] at [facility] OR    JOINT REPLACEMENT      left knee total replacement  X 3    mid leftt finger      from a cactuss    MOLE REMOVAL  2016    RADIOFREQUENCY THERMOCOAGULATION (RFTC)-NERVE-MEDIAN BRANCH-LUMBAR Left 4/11/2016    [facility] at [facility] OR    rotative cuff      no rotative cuffs on bilat shoulders has pins     SHOULDER SURGERY      shoulder surgery bilat  RIGHT X 4; LEFT X 3    TRANSRECTAL ULTRASOUND GUIDED PROSTATE BIOPSY Bilateral 11/10/2015    [facility] at [facility] OR    ULTRASOUND-ENDOSCOPIC-UPPER N/A 5/31/2017    [facility] Tra [facility] (2ND FLR)    ULTRASOUND-ENDOSCOPIC-UPPER N/A 1/4/2016    Per[facility] Tra Brooks [facility] (2ND FLR)    ULTRASOUND-ENDOSCOPIC-UPPER N/A 1/16/2015    Performed by Jason Saleem MD at [facility] (2ND FLR)       Review of patient's allergies indicates:   Allergen Reactions    Ace inhibitors Other (See Comments)     Cough    Arb-angiotensin receptor antagonist Itching    Eplerenone Other (See Comments)     Marked bradycardia, 40,  tiredness and weakness      Sulfa (sulfonamide antibiotics) Itching     Patient says this was 10 years ago and doesn't remember what happened       No current facility-administered medications on file prior to encounter.      Current Outpatient Medications on File Prior to Encounter   Medication Sig    albuterol 90 mcg/actuation inhaler 2 puffs every 4 hours as needed for cough, wheeze, or shortness of breath    albuterol-ipratropium (DUO-NEB) 2.5 mg-0.5 mg/3 mL nebulizer solution INHALE 1 VIAL BY NEBULIZER EVERY 6 HOURS AS NEEDED FOR WHEEZING    allopurinol (ZYLOPRIM) 100 MG tablet Take 1 tablet (100 mg total) by mouth once daily.    amLODIPine (NORVASC) 10 MG tablet Take 1 tablet (10 mg total) by mouth every evening.    aspirin (ECOTRIN) 81 MG EC tablet Take 81 mg by mouth once daily.      atorvastatin (LIPITOR) 80 MG tablet TAKE 1 TABLET (80 MG TOTAL) BY MOUTH ONCE DAILY.    carvedilol (COREG) 6.25 MG tablet Take 1 tablet (6.25 mg total) by mouth 2 (two) times daily with meals.    cyclobenzaprine (FLEXERIL) 5 MG tablet One tab po with breakfast and lunch take 2 tabs po before bedtime for up to 7 days    finasteride (PROSCAR) 5 mg tablet TAKE 1 TABLET ONCE DAILY.    fluticasone (FLONASE) 50 mcg/actuation nasal spray 2 sprays (100 mcg total) by Each Nare route once daily. (Patient taking differently: 2 sprays by Each Nare route daily as needed. )    gabapentin (NEURONTIN) 300 MG capsule Take 1 capsule (300 mg total) by mouth every evening.    iron polysaccharides (NIFEREX) 150 mg iron Cap Take 1 capsule (150 mg total) by mouth 2 (two) times daily before meals.    isosorbide mononitrate (ISMO,MONOKET) 10 mg tablet Take 1 tablet (10 mg total) by mouth every evening.    meclizine (ANTIVERT) 25 mg tablet TAKE 1 TABLET (25 MG TOTAL) BY MOUTH 3 (THREE) TIMES DAILY AS NEEDED.    montelukast (SINGULAIR) 10 mg tablet Take 1 tablet (10 mg total) by mouth every evening.    NITROSTAT 0.4 mg SL tablet PLACE 1  TABLET (0.4 MG TOTAL) UNDER THE TONGUE EVERY 5 (FIVE) MINUTES AS NEEDED FOR CHEST PAIN.    omeprazole (PRILOSEC) 20 MG capsule Take 1 capsule (20 mg total) by mouth once daily.    oxyCODONE-acetaminophen (PERCOCET) 5-325 mg per tablet Take 1 tablet by mouth every 6 (six) hours as needed for Pain.    polyethylene glycol (GLYCOLAX) 17 gram/dose powder Take 17 g by mouth 2 (two) times daily before meals.    predniSONE (DELTASONE) 10 MG tablet Take 1 tablet (10 mg total) by mouth once daily. for 5 days    sodium chloride (SALINE NASAL MIST) 3 % Mist 1 spray by Nasal route 2 (two) times daily.    traMADol (ULTRAM) 50 mg tablet Take 1 tablet (50 mg total) by mouth every 12 (twelve) hours as needed for Pain.    zolpidem (AMBIEN) 5 MG Tab TAKE 1 TABLET (5 MG TOTAL) BY MOUTH NIGHTLY AS NEEDED.    [DISCONTINUED] cephALEXin (KEFLEX) 500 MG capsule Take 2 capsules (1,000 mg total) by mouth every 8 (eight) hours. for 5 days    [DISCONTINUED] COLCRYS 0.6 mg tablet TAKE 1 TABLET (0.6 MG TOTAL) BY MOUTH ONCE DAILY.    [DISCONTINUED] cyanocobalamin, vitamin B-12, (VITAMIN B-12) 1,000 mcg Subl Take 1 tablet by mouth daily as needed. Takes 3,000mcg     Family History     Problem Relation (Age of Onset)    Cancer Father    Heart disease Mother, Sister    Hypertension Brother    Pneumonia Sister    Stroke Sister    Sudden death Father        Tobacco Use    Smoking status: Never Smoker    Smokeless tobacco: Never Used   Substance and Sexual Activity    Alcohol use: No    Drug use: No    Sexual activity: Not on file     Review of Systems   Gastrointestinal: Positive for abdominal distention, abdominal pain, diarrhea, nausea and vomiting.   Musculoskeletal: Positive for joint swelling.   Neurological: Positive for dizziness, weakness and light-headedness.     Objective:     Vital Signs (Most Recent):  Temp: 97.4 °F (36.3 °C) (10/21/18 1013)  Pulse: 70 (10/21/18 1501)  Resp: 18 (10/21/18 1013)  BP: (!) 154/79 (10/21/18  1501)  SpO2: 99 % (10/21/18 1501) Vital Signs (24h Range):  Temp:  [97.4 °F (36.3 °C)] 97.4 °F (36.3 °C)  Pulse:  [64-80] 70  Resp:  [18] 18  SpO2:  [91 %-100 %] 99 %  BP: (119-159)/(65-90) 154/79     Weight: 96.2 kg (212 lb)  Body mass index is 27.97 kg/m².    Physical Exam   Constitutional: He is oriented to person, place, and time. He appears well-developed and well-nourished.   HENT:   Head: Normocephalic.   Extensive periorbital bruising   Eyes: Conjunctivae and EOM are normal. Pupils are equal, round, and reactive to light.   Neck: Normal range of motion. Neck supple.   Cardiovascular: Normal rate, regular rhythm and intact distal pulses. Exam reveals no gallop and no friction rub.   Murmur heard.  2/6 SM   Pulmonary/Chest: Effort normal and breath sounds normal. No respiratory distress. He has no rales.   Abdominal: Soft. Bowel sounds are normal. He exhibits no distension. There is no tenderness. There is no rebound.   Musculoskeletal: Normal range of motion. He exhibits no edema.   Left big toe is tender to touch but there is no erythema or swelling   Neurological: He is alert and oriented to person, place, and time. No cranial nerve deficit or sensory deficit. He exhibits normal muscle tone. Coordination normal.   Skin: Skin is warm and dry.   Psychiatric: He has a normal mood and affect. His behavior is normal. Judgment and thought content normal.   Vitals reviewed.        CRANIAL NERVES     CN III, IV, VI   Pupils are equal, round, and reactive to light.  Extraocular motions are normal.        Significant Labs:   CBC:   Recent Labs   Lab 10/21/18  1039   WBC 9.70   HGB 11.2*   HCT 34.0*        CMP:   Recent Labs   Lab 10/21/18  1039   *   K 4.1      CO2 20*   GLU 77   BUN 81*   CREATININE 5.6*   CALCIUM 8.3*   PROT 7.1   ALBUMIN 4.0   BILITOT 0.4   ALKPHOS 93   AST 32   ALT 24   ANIONGAP 13   EGFRNONAA 9*       Significant Imaging: I have reviewed all pertinent imaging results/findings  within the past 24 hours.    Assessment/Plan:     * Enteritis    Tx symptomatically with IV fluids, antiemetics and narcotics as needed       Gout, arthritis    Tx with prednisone instead of colchicine because of abd symptoms and renal failure       Essential hypertension    Chronic, controlled.  Monitor BP closely.  Will continue BP medications as needed for sustained BP control.           CRF (chronic renal failure), stage 5    Pt will start dialysis soon         VTE Risk Mitigation (From admission, onward)    None             Kyara Nagy MD  Department of Hospital Medicine   [facility]

## 2018-10-21 NOTE — ASSESSMENT & PLAN NOTE
Chronic, controlled.  Monitor BP closely.  Will continue BP medications as needed for sustained BP control.

## 2018-10-21 NOTE — PROGRESS NOTES
Patient arrived to unit from ER. Vitals stable. Oriented to room. Bed in lowest position and call light within reach.        10/21/18 1544   Vital Signs   Temp 97.5 °F (36.4 °C)   Temp src Oral   Pulse 77   Heart Rate Source Monitor   Resp 18   SpO2 97 %   O2 Device (Oxygen Therapy) room air   BP (!) 174/81   MAP (mmHg) 117   BP Location Right arm   Patient Position Lying

## 2018-10-21 NOTE — HPI
77 y/o male developed abdominal discomfort, nausea, vomiting and diarrhea yesterday. He denies fever, hematemesis, melena, hematochezia or sick contacts. A CT was obtained which shows changes consistent with enteritis.    The pt also c/o left big toe pain due to gout. He took a few doses of colchicine but doesn't think that caused his abdominal symptoms.  He fell on Tuesday and injured his face. He took Keflex for 5 days.    He has CRI and will start dialysis during this admission.

## 2018-10-22 ENCOUNTER — TELEPHONE (OUTPATIENT)
Dept: MEDSURG UNIT | Facility: HOSPITAL | Age: 78
End: 2018-10-22

## 2018-10-22 VITALS
HEART RATE: 64 BPM | BODY MASS INDEX: 28.08 KG/M2 | SYSTOLIC BLOOD PRESSURE: 124 MMHG | WEIGHT: 211.88 LBS | TEMPERATURE: 99 F | RESPIRATION RATE: 18 BRPM | DIASTOLIC BLOOD PRESSURE: 69 MMHG | HEIGHT: 73 IN | OXYGEN SATURATION: 98 %

## 2018-10-22 LAB
ANION GAP SERPL CALC-SCNC: 10 MMOL/L
BUN SERPL-MCNC: 81 MG/DL
CALCIUM SERPL-MCNC: 7.4 MG/DL
CHLORIDE SERPL-SCNC: 106 MMOL/L
CO2 SERPL-SCNC: 16 MMOL/L
CREAT SERPL-MCNC: 5.2 MG/DL
EST. GFR  (AFRICAN AMERICAN): 11 ML/MIN/1.73 M^2
EST. GFR  (NON AFRICAN AMERICAN): 10 ML/MIN/1.73 M^2
GLUCOSE SERPL-MCNC: 154 MG/DL
HAV IGM SERPL QL IA: NEGATIVE
HBV CORE AB SERPL QL IA: POSITIVE
HBV CORE IGM SERPL QL IA: NEGATIVE
HBV SURFACE AB SER-ACNC: POSITIVE M[IU]/ML
HBV SURFACE AG SERPL QL IA: NEGATIVE
HBV SURFACE AG SERPL QL IA: NEGATIVE
HCV AB SERPL QL IA: NEGATIVE
MAGNESIUM SERPL-MCNC: 2 MG/DL
PHOSPHATE SERPL-MCNC: 4.2 MG/DL
POTASSIUM SERPL-SCNC: 3.6 MMOL/L
SODIUM SERPL-SCNC: 132 MMOL/L

## 2018-10-22 PROCEDURE — 36415 COLL VENOUS BLD VENIPUNCTURE: CPT

## 2018-10-22 PROCEDURE — 94761 N-INVAS EAR/PLS OXIMETRY MLT: CPT

## 2018-10-22 PROCEDURE — G8979 MOBILITY GOAL STATUS: HCPCS | Mod: CH | Performed by: PHYSICAL THERAPIST

## 2018-10-22 PROCEDURE — 97161 PT EVAL LOW COMPLEX 20 MIN: CPT | Performed by: PHYSICAL THERAPIST

## 2018-10-22 PROCEDURE — 63600175 PHARM REV CODE 636 W HCPCS: Performed by: INTERNAL MEDICINE

## 2018-10-22 PROCEDURE — 25000242 PHARM REV CODE 250 ALT 637 W/ HCPCS: Performed by: INTERNAL MEDICINE

## 2018-10-22 PROCEDURE — 99900035 HC TECH TIME PER 15 MIN (STAT)

## 2018-10-22 PROCEDURE — 84100 ASSAY OF PHOSPHORUS: CPT

## 2018-10-22 PROCEDURE — 25000003 PHARM REV CODE 250: Performed by: EMERGENCY MEDICINE

## 2018-10-22 PROCEDURE — G8978 MOBILITY CURRENT STATUS: HCPCS | Mod: CH | Performed by: PHYSICAL THERAPIST

## 2018-10-22 PROCEDURE — 83735 ASSAY OF MAGNESIUM: CPT

## 2018-10-22 PROCEDURE — 80048 BASIC METABOLIC PNL TOTAL CA: CPT

## 2018-10-22 PROCEDURE — 25000003 PHARM REV CODE 250: Performed by: INTERNAL MEDICINE

## 2018-10-22 PROCEDURE — G8980 MOBILITY D/C STATUS: HCPCS | Mod: CH | Performed by: PHYSICAL THERAPIST

## 2018-10-22 RX ORDER — COLCHICINE 0.6 MG/1
TABLET, FILM COATED ORAL
Qty: 30 TABLET | Refills: 1 | Status: SHIPPED | OUTPATIENT
Start: 2018-10-22 | End: 2018-11-30 | Stop reason: SDUPTHER

## 2018-10-22 RX ORDER — PREDNISONE 10 MG/1
20 TABLET ORAL DAILY
Qty: 5 TABLET | Refills: 0
Start: 2018-10-22 | End: 2018-10-25

## 2018-10-22 RX ADMIN — FLUTICASONE PROPIONATE 100 MCG: 50 SPRAY, METERED NASAL at 08:10

## 2018-10-22 RX ADMIN — FINASTERIDE 5 MG: 5 TABLET, FILM COATED ORAL at 08:10

## 2018-10-22 RX ADMIN — HEPARIN SODIUM 5000 UNITS: 5000 INJECTION, SOLUTION INTRAVENOUS; SUBCUTANEOUS at 08:10

## 2018-10-22 RX ADMIN — ALLOPURINOL 100 MG: 100 TABLET ORAL at 08:10

## 2018-10-22 RX ADMIN — ATORVASTATIN CALCIUM 80 MG: 40 TABLET, FILM COATED ORAL at 08:10

## 2018-10-22 RX ADMIN — OXYCODONE HYDROCHLORIDE AND ACETAMINOPHEN 1 TABLET: 5; 325 TABLET ORAL at 03:10

## 2018-10-22 RX ADMIN — SENNOSIDES AND DOCUSATE SODIUM 1 TABLET: 8.6; 5 TABLET ORAL at 08:10

## 2018-10-22 RX ADMIN — ASPIRIN 81 MG: 81 TABLET, COATED ORAL at 08:10

## 2018-10-22 RX ADMIN — CARVEDILOL 6.25 MG: 6.25 TABLET, FILM COATED ORAL at 08:10

## 2018-10-22 RX ADMIN — PREDNISONE 40 MG: 20 TABLET ORAL at 08:10

## 2018-10-22 RX ADMIN — PANTOPRAZOLE SODIUM 40 MG: 40 TABLET, DELAYED RELEASE ORAL at 08:10

## 2018-10-22 NOTE — PT/OT/SLP EVAL
Physical Therapy Evaluation and Discharge Note    Patient Name:  Micheal Hunt   MRN:  [id number]    Recommendations:     Discharge Recommendations:  home   Discharge Equipment Recommendations: none   Barriers to discharge: None    Assessment:     Micheal Hunt is a 78 y.o. male admitted with a medical diagnosis of Enteritis. Pt also has had 2 mechanical falls recently.  The first fall was when he was walking his farm animal, and it stepped on the cat's tail, which made the cat jump, and then startled the farm animal he was walking, and he fell. This fall resulted in a sprained L wrist for which he is wearing a L wrist splint.  The 2nd time, he was walking down his ramp, and his foot became tangled in some wires, and he fell down the ramp.  This fall resulted in a bruised face and comminuted nasal bone fracture.  During PT evaluation, he demonstrated all functional mobility independently including bed mobility, and ambulation.  He did not have any LOB or unsteadiness, and demonstrated good gait speed.  At this time, patient is functioning at his prior level of function and does not require further acute PT services.     Recent Surgery: * No surgery found *      Plan:     During this hospitalization, patient does not require further acute PT services.  Please re-consult if situation changes.      Subjective     Chief Complaint: none  Patient/Family Comments/goals: Pt wants to go home  Pain/Comfort:  · Pain Rating 1: 0/10    Patients cultural, spiritual, Pent[employer]al conflicts given the current situation: none    Living Environment:  Pt lives alone in a 1 level home with 3 steps to enter, or he also has a ramp.  His dtr lives close by.  Prior to admission, patients level of function was indepnedent.  Equipment used at home: cane, straight.  DME owned (not currently used): none.  Upon discharge, patient will have prn assistance from dtr.    Objective:     Patient found supine in bed upon PT entry to room  found with: peripheral IV, telemetry     General Precautions: Standard, fall   Orthopedic Precautions:N/A   Braces: UE brace(L wrist splint)     Exams:  · Cognitive Exam:  Patient is oriented to Person, Place, Time and Situation  · Postural Exam:  Patient presented with the following abnormalities:    · -       Rounded shoulders  · -       Forward head  · Skin Integrity/Edema:      · -       significant bruising noted around B eyes, and nose from fall.  · RUE ROM: WFL  · RUE Strength: WFL  · LUE ROM: WFL  · LUE Strength: WFL  · RLE ROM: WFL  · RLE Strength: WFL  · LLE ROM: WFL  · LLE Strength: WFL    Functional Mobility:  · Bed Mobility:     · Supine to Sit: independence  · Transfers:     · Sit to Stand:  independence with no AD  · Gait: 500ft with no device and no LOB.  Pt noted to be able to talk to people as he passed them in the hallway, and did not have any issues with turning head to look at PT during gait.  He demonstrated good cristian and gait speed.   · Balance: normal    AM-PAC 6 CLICK MOBILITY  Total Score:24       AM-PAC 6 CLICK MOBILITY  Total Score:24     Patient left sitting on the EOB with all lines intact, call button in reach and Charge RN present.    GOALS:   Multidisciplinary Problems     Physical Therapy Goals     Not on file          Multidisciplinary Problems (Resolved)        Problem: Physical Therapy Goal    Goal Priority Disciplines Outcome Goal Variances Interventions   Physical Therapy Goal   (Resolved)     PT, PT/OT Outcome(s) achieved                     History:     Past Medical History:   Diagnosis Date    NICK (acute kidney injury) 7/29/2016    Allergy     Anemia, mild 12/15/2014    Arthritis     Gout    Benign essential HTN 3/27/2012    BMI 29.0-29.9,adult 5/10/2018    BPH (benign prostatic hyperplasia)     BPH (benign prostatic hyperplasia)     CAD (coronary artery disease) 2006    Chronic kidney disease     due to ibuprofen    Colon polyp     CRF (chronic renal  failure), stage 5     Diverticulosis     Gastritis     GERD (gastroesophageal reflux disease)     Gout     History of colon polyps 5/3/2018    HTN (hypertension) 3/27/2012    Hyperlipidemia     Hyperlipidemia     LLL pneumonia 6/14/2018    LVH (left ventricular hypertrophy)     Mesenteric ischemia     Murmur, cardiac 3/27/2012    GRICELDA (obstructive sleep apnea)     DOES NOT USE A MACHINE    Sinus problem     Syncope and collapse        Past Surgical History:   Procedure Laterality Date    APPENDECTOMY      BLOCK-NERVE Left 5/31/2016    [facility]    CARDIAC CATHETERIZATION      COLONOSCOPY  2011    COLONOSCOPY N/A 5/3/2018    Procedure: COLONOSCOPY;  Surgeon: Messi Harris MD;  Location: [facility];  Service: Endoscopy;  Laterality: N/A;    COLONOSCOPY N/A 5/3/2018    [facility] at [facility]    COLONOSCOPY N/A 9/10/2015    [facility] at [facility]    CORONARY ARTERY BYPASS GRAFT  4/2007    x 1    CYSTOSCOPY N/A 8/30/2017    [facility]    CYSTOSCOPY N/A 11/10/2015    [facility]    ESOPHAGOGASTRODUODENOSCOPY (EGD) N/A 1/4/2016    [facility]O (2ND FLR)    ESOPHAGOGASTRODUODENOSCOPY (EGD) N/A 10/15/2014    [facility] ENDO    INJECTION-STEROID-EPIDURAL-TRANSFORAMINAL Left 2/25/2016    [facility] OR    JOINT REPLACEMENT      left knee total replacement  X 3    mid leftt finger      from a cactuss    MOLE REMOVAL  2016    RADIOFREQUENCY THERMOCOAGULATION (RFTC)-NERVE-MEDIAN BRANCH-LUMBAR Left 4/11/2016    [facility] OR    rotative cuff      no rotative cuffs on bilat shoulders has pins     SHOULDER SURGERY      shoulder surgery bilat  RIGHT X 4; LEFT X 3    TRANSRECTAL ULTRASOUND GUIDED PROSTATE BIOPSY Bilateral 11/10/2015    [facility]    ULTRASOUND-ENDOSCOPIC-UPPER N/A 5/31/2017    [facility] at [facility] ENDO (2ND FLR)    ULTRASOUND-ENDOSCOPIC-UPPER N/A 1/4/2016    [facility] Tra [facility] at [facility] ENDO (2ND FLR)    ULTRASOUND-ENDOSCOPIC-UPPER N/A 1/16/2015    Performed by Jason Saleem MD at [facility] ENDO (2ND FLR)       Clinical Decision Making:     History  Co-morbidities and personal factors that may impact the plan of care Examination  Body Structures and Functions, activity limitations and participation restrictions that may impact the plan of care Clinical Presentation   Decision Making/ Complexity Score   Co-morbidities:   [] Time since onset of injury / illness / exacerbation  [] Status of current condition  []Patient's cognitive status and safety concerns    [] Multiple Medical Problems (see med hx)  Personal Factors:   [] Patient's age  [] Prior Level of function   [] Patient's home situation (environment and family support)  [] Patient's level of motivation  [] Expected progression of patient      HISTORY:(criteria)    [] 69921 - no personal factors/history    [] 85982 - has 1-2 personal factor/comorbidity     [] 94416 - has >3 personal factor/comorbidity     Body Regions:  [] Objective examination findings  [] Head     []  Neck  [] Trunk   [] Upper Extremity  [] Lower Extremity    Body Systems:  [] For communication ability, affect, cognition, language, and learning style: the assessment of the ability to make needs known, consciousness, orientation (person, place, and time), expected emotional /behavioral responses, and learning preferences (eg, learning barriers, education  needs)  [] For the neuromuscular system: a general assessment of gross coordinated movement (eg, balance, gait, locomotion, transfers, and transitions) and motor function  (motor control and motor learning)  [] For the musculoskeletal system: the assessment of gross symmetry, gross range of motion, gross  strength, height, and weight  [] For the integumentary system: the assessment of pliability(texture), presence of scar formation, skin color, and skin integrity  [] For cardiovascular/pulmonary system: the assessment of heart rate, respiratory rate, blood pressure, and edema     Activity limitations:    [] Patient's cognitive status and saf ety concerns          [] Status of current condition      [] Weight bearing restriction  [] Cardiopulmunary Restriction    Participation Restrictions:   [] Goals and goal agreement with the patient     [] Rehab potential (prognosis) and probable outcome      Examination of Body System: (criteria)    [] 10593 - addressing 1-2 elements    [] 78255 - addressing a total of 3 or more elements     [] 67108 -  Addressing a total of 4 or more elements         Clinical Presentation: (criteria)  Choose one     On examination of body system using standardized tests and measures patient presents with (CHOOSE ONE) elements from any of the following: body structures and functions, activity limitations, and/or participation restrictions.  Leading to a clinical presentation that is considered (CHOOSE ONE)                              Clinical Decision Making  (Eval Complexity):  Choose One     Time Tracking:     PT Received On: 10/22/18  PT Start Time: 0932     PT Stop Time: 0944  PT Total Time (min): 12 min     Billable Minutes: Evaluation 12      Telma Golden, PT  10/22/2018

## 2018-10-22 NOTE — PLAN OF CARE
Problem: Patient Care Overview  Goal: Plan of Care Review  Outcome: Ongoing (interventions implemented as appropriate)  Plan of care reviewed with patient. Patient verbalized understanding. AAO x 4. VSS/NADN. IV fluids infusing per orders. Patient tolerating clear liquid diet advanced to full liquid for breakfast. No complaints of nausea/vomiting/diarrhea. PRN pain meds given with relief noted. Tele monitor in place. Pt ambulatory to bathroom. SR up x 2, bed in lowest position, call light in reach. Will continue to monitor.

## 2018-10-22 NOTE — DISCHARGE SUMMARY
[facility] Hospital Medicine  Discharge Summary      Patient Name: Micheal Hunt  MRN: [id number]  Admission Date: 10/21/2018  Hospital Length of Stay: 1 days  Discharge Date and Time:  10/22/2018 10:54 AM  Attending Physician: Kyara Nagy MD   Discharging Provider: Kyara Nagy MD  Primary Care Provider: Pk Lakhani MD      HPI:   77 y/o male developed abdominal discomfort, nausea, vomiting and diarrhea yesterday. He denies fever, hematemesis, melena, hematochezia or sick contacts. A CT was obtained which shows changes consistent with enteritis.    The pt also c/o left big toe pain due to gout. He took a few doses of colchicine but doesn't think that caused his abdominal symptoms.  He fell on Tuesday and injured his face. He took Keflex for 5 days.    He has CRI and will start dialysis during this admission.    * No surgery found *      Hospital Course:   77 y/o admitted secondary to emesis, abdominal discomfort and diarrhea.  He was treated with IV fluids and antiemetics. He feels much better and will be discharged home.    He has CRF and will start dialysis soon.    He has an acute gout attack which is being treated with prednisone     Consults:   Consults (From admission, onward)        Status Ordering Provider     Inpatient consult to Nephrology  Once     Provider:  Darrel Cary MD    Acknowledged MARCIA ROWELL          No new Assessment & Plan notes have been filed under this hospital service since the last note was generated.  Service: Hospital Medicine    Final Active Diagnoses:    Diagnosis Date Noted POA    PRINCIPAL PROBLEM:  Enteritis [K52.9] 10/21/2018 Yes    Gout, arthritis [M10.9] 04/27/2012 Yes    Essential hypertension [I10] 03/27/2012 Yes    CRF (chronic renal failure), stage 5 [N18.5] 10/21/2018 Yes    Aortic stenosis [I35.0] 08/10/2018 Unknown      Problems Resolved During this Admission:       Discharged Condition: stable    Disposition:  Home or Self Care    Follow Up:  Follow-up Information     Frankie Rojo MD In 1 week.    Specialty:  Nephrology  Contact information:  [address]  [facility]  [place] [address]  [phone]             Pk Lakhani MD In 1 week.    Specialty:  Family Medicine  Contact information:  [address]  [place] [address]  [phone]                 Patient Instructions:      Diet renal     Activity as tolerated       Significant Diagnostic Studies: Labs:   BMP:   Recent Labs   Lab 10/21/18  1039 10/22/18  0559   GLU 77 154*   * 132*   K 4.1 3.6    106   CO2 20* 16*   BUN 81* 81*   CREATININE 5.6* 5.2*   CALCIUM 8.3* 7.4*   MG 2.5 2.0    and CBC   Recent Labs   Lab 10/21/18  1039   WBC 9.70   HGB 11.2*   HCT 34.0*          Pending Diagnostic Studies:     Procedure Component Value Units Date/Time    Hepatitis B core antibody, total [[id number]] Collected:  10/21/18 1104    Order Status:  Sent Lab Status:  In process Updated:  10/21/18 1625    Specimen:  Blood     Hepatitis B surface antibody [[id number]] Collected:  10/21/18 1104    Order Status:  Sent Lab Status:  In process Updated:  10/21/18 1625    Specimen:  Blood     Hepatitis B surface antigen [[id number]] Collected:  10/21/18 1104    Order Status:  Sent Lab Status:  In process Updated:  10/21/18 1625    Specimen:  Blood     Hepatitis panel, acute [[id number]] Collected:  10/21/18 1039    Order Status:  Sent Lab Status:  In process Updated:  10/21/18 1625    Specimen:  Blood          Medications:  Reconciled Home Medications:      Medication List      CHANGE how you take these medications    fluticasone 50 mcg/actuation nasal spray  Commonly known as:  FLONASE  2 sprays (100 mcg total) by Each Nare route once daily.  What changed:    · when to take this  · reasons to take this     predniSONE 10 MG tablet  Commonly known as:  DELTASONE  Take 2 tablets (20 mg total) by mouth once daily. for 3 days  What changed:  how much to  take        CONTINUE taking these medications    albuterol 90 mcg/actuation inhaler  Commonly known as:  PROVENTIL/VENTOLIN HFA  2 puffs every 4 hours as needed for cough, wheeze, or shortness of breath     albuterol-ipratropium 2.5 mg-0.5 mg/3 mL nebulizer solution  Commonly known as:  DUO-NEB  INHALE 1 VIAL BY NEBULIZER EVERY 6 HOURS AS NEEDED FOR WHEEZING     allopurinol 100 MG tablet  Commonly known as:  ZYLOPRIM  Take 1 tablet (100 mg total) by mouth once daily.     amLODIPine 10 MG tablet  Commonly known as:  NORVASC  Take 1 tablet (10 mg total) by mouth every evening.     aspirin 81 MG EC tablet  Commonly known as:  ECOTRIN  Take 81 mg by mouth once daily.     atorvastatin 80 MG tablet  Commonly known as:  LIPITOR  TAKE 1 TABLET (80 MG TOTAL) BY MOUTH ONCE DAILY.     carvedilol 6.25 MG tablet  Commonly known as:  COREG  Take 1 tablet (6.25 mg total) by mouth 2 (two) times daily with meals.     cyclobenzaprine 5 MG tablet  Commonly known as:  FLEXERIL  One tab po with breakfast and lunch take 2 tabs po before bedtime for up to 7 days     finasteride 5 mg tablet  Commonly known as:  PROSCAR  TAKE 1 TABLET ONCE DAILY.     gabapentin 300 MG capsule  Commonly known as:  NEURONTIN  Take 1 capsule (300 mg total) by mouth every evening.     iron polysaccharides 150 mg iron Cap  Commonly known as:  NIFEREX  Take 1 capsule (150 mg total) by mouth 2 (two) times daily before meals.     isosorbide mononitrate 10 mg tablet  Commonly known as:  ISMO,MONOKET  Take 1 tablet (10 mg total) by mouth every evening.     meclizine 25 mg tablet  Commonly known as:  ANTIVERT  TAKE 1 TABLET (25 MG TOTAL) BY MOUTH 3 (THREE) TIMES DAILY AS NEEDED.     montelukast 10 mg tablet  Commonly known as:  SINGULAIR  Take 1 tablet (10 mg total) by mouth every evening.     NITROSTAT 0.4 MG SL tablet  Generic drug:  nitroGLYCERIN  PLACE 1 TABLET (0.4 MG TOTAL) UNDER THE TONGUE EVERY 5 (FIVE) MINUTES AS NEEDED FOR CHEST PAIN.     omeprazole 20 MG  capsule  Commonly known as:  PRILOSEC  Take 1 capsule (20 mg total) by mouth once daily.     oxyCODONE-acetaminophen 5-325 mg per tablet  Commonly known as:  PERCOCET  Take 1 tablet by mouth every 6 (six) hours as needed for Pain.     polyethylene glycol 17 gram/dose powder  Commonly known as:  GLYCOLAX  Take 17 g by mouth 2 (two) times daily before meals.     sodium chloride 3 % Mist  Commonly known as:  SALINE NASAL MIST  1 spray by Nasal route 2 (two) times daily.     traMADol 50 mg tablet  Commonly known as:  ULTRAM  Take 1 tablet (50 mg total) by mouth every 12 (twelve) hours as needed for Pain.        STOP taking these medications    cephALEXin 500 MG capsule  Commonly known as:  KEFLEX     COLCRYS 0.6 mg tablet  Generic drug:  colchicine     VITAMIN B-12 1,000 mcg Subl  Generic drug:  cyanocobalamin (vitamin B-12)     zolpidem 5 MG Tab  Commonly known as:  AMBIEN            Indwelling Lines/Drains at time of discharge:   Lines/Drains/Airways     Drain                 Hemodialysis AV Fistula Left upper arm -- days                Time spent on the discharge of patient: 30 minutes  Patient was seen and examined on the date of discharge and determined to be suitable for discharge.         Kyara Nagy MD  Department of Hospital Medicine  [facility]

## 2018-10-22 NOTE — PLAN OF CARE
"Problem: Patient Care Overview  Goal: Plan of Care Review  Outcome: Outcome(s) achieved Date Met: 10/22/18  Pt states "understanding" to d/c instructions, new medication administration and follow appointments, denies joint pain/discomfort prior to d/c, heplock and telemetry removed, pt tolerated well, safety maintain    1625-pt packed personal belongings per self, escorted to main lobby by staff, to home per private vehicle    "

## 2018-10-22 NOTE — PLAN OF CARE
Problem: Physical Therapy Goal  Goal: Physical Therapy Goal  Outcome: Outcome(s) achieved Date Met: 10/22/18  Goals to be met by: 10/22/2018     Patient will increase functional independence with mobility by performin. Supine to sit with [place]  2. Sit to stand transfer with [place]  3. Gait  x 500 feet with [place].     Comments: Goals established and met

## 2018-10-22 NOTE — PLAN OF CARE
Attempted to meet with pt to complete his assessment.  No one was in the room.  Pt's nurse verified that pt had been discharged.       10/22/18 6558   Discharge Assessment   Assessment Type Discharge Planning Assessment

## 2018-10-22 NOTE — PT/OT/SLP PROGRESS
Occupational Therapy      Patient Name:  Micheal Hunt   MRN:  [id number]    OT order received and chart reviewed, noting that PT determined pt was independent with all functional mobility. Nurse, Ros stated that pt was performing self care tasks independently. OT briefly interviewed pt regarding his L wrist sprain and pt reported that it is already much better and that he is able to use his L UE without problems for all ADL's with splint in place. He also stated that he has a follow-up appointment with his doctor on Wednesday for his L wrist. Pt stated he has no therapy needs. OT orders to be discontinued.    DIANE Hernandez  10/22/2018

## 2018-10-22 NOTE — PLAN OF CARE
10/22/18 0756   PRE-TX-O2-ETCO2   O2 Device (Oxygen Therapy) room air   SpO2 96 %   Pulse Oximetry Type Intermittent   $ Pulse Oximetry - Multiple Charge Pulse Oximetry - Multiple   Aerosol Therapy   $ Aerosol Therapy Charges Refused

## 2018-10-22 NOTE — HOSPITAL COURSE
79 y/o admitted secondary to emesis, abdominal discomfort and diarrhea.  He was treated with IV fluids and antiemetics. He feels much better and will be discharged home.    He has CRF and will start dialysis soon.    He has an acute gout attack which is being treated with prednisone

## 2018-10-22 NOTE — PROGRESS NOTES
"INPATIENT NEPHROLOGY PROGRESS NOTE  [facility] NEPHROLOGY    Micheal Hunt  10/22/2018    Reason for consultation:        Stage v ckd    Chief Complaint:   Chief Complaint   Patient presents with    Abdominal Pain     emesis and diarrhea        History of Present Illness:       History of Present Illness:  77 y/o male patient known to our practice, sees Dr. Rojo in the office setting.  Pt reports that Dr. Rojo has been talking to him about the possibility of needing to start dialysis soon.  He has had N/V x 5 days, started having diarrhea yesterday.  Fell last week, "tripped over a wire",  facial lacerations and bruising noted.  He called Dr. RICARDO this am and told him he was ready to start dialysis and presented to the ER.  He has a L arm AVF placed about 6 mos ago, +thrill and bruit.  Renal is consulted for co-management and new start HD.       10/22 no nausea, no diarrhea.  Hungry.           Plan of Care:     Assessment:    ckd v  Nausea/vomiting likely gastroenteritis--resolved  Metabolic acidosis due to ckd and gi losses  anemia    Plan:    Ok to d/c  I will have my PD nurse call him at home and set up training for home PD (pt wants to do this now)  Continue outpt medication  shantal at dialysis clinic  I d/w at length the the patient and his daughter       Thank you for allowing us to participate in this patient's care. We will continue to follow.    Medications:  No current facility-administered medications on file prior to encounter.      Current Outpatient Medications on File Prior to Encounter   Medication Sig Dispense Refill    meclizine (ANTIVERT) 25 mg tablet TAKE 1 TABLET (25 MG TOTAL) BY MOUTH 3 (THREE) TIMES DAILY AS NEEDED. 90 tablet 1    omeprazole (PRILOSEC) 20 MG capsule Take 1 capsule (20 mg total) by mouth once daily. 90 capsule 1    traMADol (ULTRAM) 50 mg tablet Take 1 tablet (50 mg total) by mouth every 12 (twelve) hours as needed for Pain. 60 tablet 2    albuterol 90 mcg/actuation " inhaler 2 puffs every 4 hours as needed for cough, wheeze, or shortness of breath 3 Inhaler 3    albuterol-ipratropium (DUO-NEB) 2.5 mg-0.5 mg/3 mL nebulizer solution INHALE 1 VIAL BY NEBULIZER EVERY 6 HOURS AS NEEDED FOR WHEEZING 270 mL 0    allopurinol (ZYLOPRIM) 100 MG tablet Take 1 tablet (100 mg total) by mouth once daily. 90 tablet 1    amLODIPine (NORVASC) 10 MG tablet Take 1 tablet (10 mg total) by mouth every evening. 90 tablet 1    aspirin (ECOTRIN) 81 MG EC tablet Take 81 mg by mouth once daily.        atorvastatin (LIPITOR) 80 MG tablet TAKE 1 TABLET (80 MG TOTAL) BY MOUTH ONCE DAILY. 90 tablet 3    carvedilol (COREG) 6.25 MG tablet Take 1 tablet (6.25 mg total) by mouth 2 (two) times daily with meals. 60 tablet 3    cyclobenzaprine (FLEXERIL) 5 MG tablet One tab po with breakfast and lunch take 2 tabs po before bedtime for up to 7 days 30 tablet 0    finasteride (PROSCAR) 5 mg tablet TAKE 1 TABLET ONCE DAILY. 90 tablet 3    fluticasone (FLONASE) 50 mcg/actuation nasal spray 2 sprays (100 mcg total) by Each Nare route once daily. (Patient taking differently: 2 sprays by Each Nare route daily as needed. ) 3 Bottle 3    gabapentin (NEURONTIN) 300 MG capsule Take 1 capsule (300 mg total) by mouth every evening. 90 capsule 3    iron polysaccharides (NIFEREX) 150 mg iron Cap Take 1 capsule (150 mg total) by mouth 2 (two) times daily before meals. 60 capsule 11    isosorbide mononitrate (ISMO,MONOKET) 10 mg tablet Take 1 tablet (10 mg total) by mouth every evening. 30 tablet 3    montelukast (SINGULAIR) 10 mg tablet Take 1 tablet (10 mg total) by mouth every evening. 90 tablet 1    NITROSTAT 0.4 mg SL tablet PLACE 1 TABLET (0.4 MG TOTAL) UNDER THE TONGUE EVERY 5 (FIVE) MINUTES AS NEEDED FOR CHEST PAIN. 25 tablet 3    oxyCODONE-acetaminophen (PERCOCET) 5-325 mg per tablet Take 1 tablet by mouth every 6 (six) hours as needed for Pain. 14 tablet 0    polyethylene glycol (GLYCOLAX) 17 gram/dose  powder Take 17 g by mouth 2 (two) times daily before meals. 1 Bottle 4    predniSONE (DELTASONE) 10 MG tablet Take 1 tablet (10 mg total) by mouth once daily. for 5 days 5 tablet 0    sodium chloride (SALINE NASAL MIST) 3 % Mist 1 spray by Nasal route 2 (two) times daily.      zolpidem (AMBIEN) 5 MG Tab TAKE 1 TABLET (5 MG TOTAL) BY MOUTH NIGHTLY AS NEEDED. 30 tablet 5     Scheduled Meds:   sodium chloride 0.9%   Intravenous Once    albuterol-ipratropium  3 mL Nebulization Q6H    allopurinol  100 mg Oral Daily    amLODIPine  10 mg Oral QHS    aspirin  81 mg Oral Daily    atorvastatin  80 mg Oral Daily    carvedilol  6.25 mg Oral BID WM    finasteride  5 mg Oral Daily    fluticasone  2 spray Each Nare Daily    gabapentin  300 mg Oral QHS    heparin (porcine)  5,000 Units Subcutaneous Q12H    isosorbide mononitrate  10 mg Oral QHS    montelukast  10 mg Oral QHS    pantoprazole  40 mg Oral Daily    predniSONE  40 mg Oral Daily    senna-docusate 8.6-50 mg  1 tablet Oral BID     Continuous Infusions:   sodium chloride 0.9% 100 mL/hr at 10/21/18 1810     PRN Meds:.sodium chloride 0.9%, acetaminophen, albuterol sulfate, cyclobenzaprine, magnesium oxide, magnesium oxide, meclizine, nitroGLYCERIN, ondansetron, oxyCODONE-acetaminophen, potassium chloride 10%, senna-docusate 8.6-50 mg, sodium chloride 0.9%, zolpidem    Allergies:  Ace inhibitors; Arb-angiotensin receptor antagonist; Eplerenone; and Sulfa (sulfonamide antibiotics)    Vital Signs:  Temp Readings from Last 3 Encounters:   10/22/18 98.7 °F (37.1 °C) (Oral)   10/19/18 98 °F (36.7 °C) (Oral)   10/12/18 97.6 °F (36.4 °C) (Oral)       Pulse Readings from Last 3 Encounters:   10/22/18 63   10/19/18 74   10/17/18 83       BP Readings from Last 3 Encounters:   10/22/18 123/68   10/19/18 138/67   10/17/18 (!) 157/95       Weight:  Wt Readings from Last 3 Encounters:   10/21/18 96.1 kg (211 lb 14.4 oz)   10/19/18 96.1 kg (211 lb 13.8 oz)   10/17/18 96.2  kg (212 lb)       Review of Systems:  Review of Systems - All 14 systems reviewed and negative, except as noted in HPI    Physical Exam:    Constitutional: NAD  Neuro: No asterixis  Psych: Calm  EENT: NCAT, anicteric sclera   Neck:  supple  Cards: No rub  Lungs: clear to ausculation  GI:  Pos bowel sounds, no hsm, NTND   MSK:  WNWD  Skin: no purpura, rashes  Access:  avf    Results:  Lab Results   Component Value Date     (L) 10/22/2018    K 3.6 10/22/2018     10/22/2018    CO2 16 (L) 10/22/2018    BUN 81 (H) 10/22/2018    CREATININE 5.2 (H) 10/22/2018    CALCIUM 7.4 (L) 10/22/2018    ANIONGAP 10 10/22/2018    ESTGFRAFRICA 11 (A) 10/22/2018    EGFRNONAA 10 (A) 10/22/2018       Lab Results   Component Value Date    CALCIUM 7.4 (L) 10/22/2018    PHOS 4.2 10/22/2018       Recent Labs   Lab 10/21/18  1039   WBC 9.70   RBC 3.71*   HGB 11.2*   HCT 34.0*      MCV 92   MCH 30.3   MCHC 33.0       I have personally reviewed pertinent radiological imaging and reports.    Frankie Rojo MD  Nephrology  [facility]  [phone]

## 2018-10-22 NOTE — PROGRESS NOTES
10/21/18 1950   Patient Assessment/Suction   Level of Consciousness (AVPU) alert   Respiratory Effort Unlabored   Rhythm/Pattern, Respiratory no shortness of breath reported   Cough Frequency infrequent   Cough Type nonproductive   PRE-TX-O2-ETCO2   O2 Device (Oxygen Therapy) room air   SpO2 95 %   Pulse Oximetry Type Intermittent   $ Pulse Oximetry - Multiple Charge Pulse Oximetry - Multiple   Aerosol Therapy   $ Aerosol Therapy Charges Refused  (Pt states he only take aerosol treat PRN.)

## 2018-10-22 NOTE — PLAN OF CARE
10/22/18 1632   Final Note   Assessment Type Final Discharge Note   Discharge Disposition Home   What phone number can be called within the next 1-3 days to see how you are doing after discharge? ([phone])   Hospital Follow Up  Appt(s) scheduled? No

## 2018-10-23 DIAGNOSIS — R42 VERTIGO: ICD-10-CM

## 2018-10-23 RX ORDER — MECLIZINE HYDROCHLORIDE 25 MG/1
25 TABLET ORAL 3 TIMES DAILY PRN
Qty: 90 TABLET | Refills: 0 | Status: SHIPPED | OUTPATIENT
Start: 2018-10-23 | End: 2018-12-06 | Stop reason: SDUPTHER

## 2018-10-24 ENCOUNTER — TELEPHONE (OUTPATIENT)
Dept: UROLOGY | Facility: CLINIC | Age: 78
End: 2018-10-24

## 2018-10-24 ENCOUNTER — TELEPHONE (OUTPATIENT)
Dept: FAMILY MEDICINE | Facility: CLINIC | Age: 78
End: 2018-10-24

## 2018-10-24 ENCOUNTER — LAB VISIT (OUTPATIENT)
Dept: LAB | Facility: HOSPITAL | Age: 78
End: 2018-10-24
Attending: INTERNAL MEDICINE
Payer: MEDICARE

## 2018-10-24 DIAGNOSIS — Z79.899 ERYTHROPOIETIN (EPO) STIMULATING AGENT ANEMIA MANAGEMENT PATIENT: ICD-10-CM

## 2018-10-24 DIAGNOSIS — D64.9 ERYTHROPOIETIN (EPO) STIMULATING AGENT ANEMIA MANAGEMENT PATIENT: ICD-10-CM

## 2018-10-24 LAB
BASOPHILS # BLD AUTO: 0 K/UL
BASOPHILS NFR BLD: 0.2 %
DIFFERENTIAL METHOD: ABNORMAL
EOSINOPHIL # BLD AUTO: 0 K/UL
EOSINOPHIL NFR BLD: 0 %
ERYTHROCYTE [DISTWIDTH] IN BLOOD BY AUTOMATED COUNT: 15.7 %
HCT VFR BLD AUTO: 31.8 %
HGB BLD-MCNC: 10.5 G/DL
LYMPHOCYTES # BLD AUTO: 0.7 K/UL
LYMPHOCYTES NFR BLD: 8 %
MCH RBC QN AUTO: 30.1 PG
MCHC RBC AUTO-ENTMCNC: 33.2 G/DL
MCV RBC AUTO: 91 FL
MONOCYTES # BLD AUTO: 0.3 K/UL
MONOCYTES NFR BLD: 4 %
NEUTROPHILS # BLD AUTO: 7.3 K/UL
NEUTROPHILS NFR BLD: 87.8 %
PLATELET # BLD AUTO: 218 K/UL
PMV BLD AUTO: 7.8 FL
RBC # BLD AUTO: 3.5 M/UL
WBC # BLD AUTO: 8.3 K/UL

## 2018-10-24 PROCEDURE — 85025 COMPLETE CBC W/AUTO DIFF WBC: CPT

## 2018-10-24 PROCEDURE — 36415 COLL VENOUS BLD VENIPUNCTURE: CPT

## 2018-10-24 RX ORDER — ISOSORBIDE MONONITRATE 10 MG/1
TABLET ORAL
Qty: 30 TABLET | Refills: 3 | Status: SHIPPED | OUTPATIENT
Start: 2018-10-24 | End: 2019-03-04 | Stop reason: SDUPTHER

## 2018-10-24 NOTE — TELEPHONE ENCOUNTER
Spoke with patient, results given with recommendation, when he gets home he will check his calendar and give a call back with a date for the cysto w/retro, patient verbally understood.

## 2018-10-24 NOTE — TELEPHONE ENCOUNTER
PAtient was seen in the ER on 10/21/18 for n/v/d. Needs f/u appointment. Continuing to have intermittent diarrhea. Afraid he is loosing too much fluids. Took Imodium and it slowed nivia a little bit. Scheduled to see Dr Lakhani 10/25/18.

## 2018-10-24 NOTE — TELEPHONE ENCOUNTER
----- Message from Celine Payne MD sent at 10/23/2018  9:57 PM CDT -----  C&S and Cytology - neg  U/A - + for blood  CT scan - renal cysts, atrophic kidneys, enlarged prostate                   Enlarged loops of bowel    Rec: Cysto with retrogrades to complete hematuria w/u.           Needs to f/u with PCP or GI re- GI findings

## 2018-10-24 NOTE — TELEPHONE ENCOUNTER
----- Message from Marky Caruso sent at 10/24/2018  9:25 AM CDT -----  Contact: patient  Micheal, [phone]. Requesting to speak with the nurse. Please advise. Thanks.

## 2018-10-25 ENCOUNTER — TELEPHONE (OUTPATIENT)
Dept: FAMILY MEDICINE | Facility: CLINIC | Age: 78
End: 2018-10-25

## 2018-10-25 NOTE — TELEPHONE ENCOUNTER
"----- Message from Sintia Ramachandran sent at 10/25/2018 10:27 AM CDT -----  Contact: self  Patient [phone] would only say "just ask the nurse to call me"/please call  "

## 2018-10-25 NOTE — TELEPHONE ENCOUNTER
"----- Message from Sintia Ramachandran sent at 10/25/2018 10:27 AM CDT -----  Contact: self  Patient [phone] would only say "just ask the nurse to call me"/please call  "

## 2018-10-26 ENCOUNTER — TELEPHONE (OUTPATIENT)
Dept: UROLOGY | Facility: CLINIC | Age: 78
End: 2018-10-26

## 2018-10-26 ENCOUNTER — TELEPHONE (OUTPATIENT)
Dept: FAMILY MEDICINE | Facility: CLINIC | Age: 78
End: 2018-10-26

## 2018-10-26 NOTE — TELEPHONE ENCOUNTER
Returned call, cytology results given to patient again with recommendation and explained cysto w/retro. Patient will check his schedule and call me back when ready to schedule, patient verbally understood.

## 2018-10-26 NOTE — TELEPHONE ENCOUNTER
----- Message from Tim Moreno sent at 10/26/2018  9:07 AM CDT -----  Contact: pt  Type:  Patient Returning Call    Who Called:  pt  Who Left Message for Patient:  essence  Does the patient know what this is regarding?:  Blood in urine  Best Call Back Number:     Additional Information:

## 2018-10-26 NOTE — TELEPHONE ENCOUNTER
----- Message from Tim Moreno sent at 10/26/2018  9:12 AM CDT -----  Contact: pt  Type: Needs Medical Advice    Who Called:  pt  Symptoms (please be specific):  Blood urine   How long has patient had these symptoms:  2 days  Pharmacy name and phone #:    [id number] - [place], [place] - [address] AT [facility]  [address]  [place] [address]  Phone: [phone] Fax: [phone]    Best Call Back Number: [phone]   Additional Information:

## 2018-10-29 ENCOUNTER — TELEPHONE (OUTPATIENT)
Dept: FAMILY MEDICINE | Facility: CLINIC | Age: 78
End: 2018-10-29

## 2018-10-29 NOTE — TELEPHONE ENCOUNTER
Patient going to ER.  Patient stated he has blood in his urine and he has severe bleeding rectally.

## 2018-10-29 NOTE — TELEPHONE ENCOUNTER
----- Message from Darlene Benson sent at 10/29/2018  7:10 AM CDT -----  Type:  Same Day Appointment Request    Caller is requesting a same day appointment.  Caller declined first available appointment listed below.      Name of Caller:  Self   When is the first available appointment?  Tuesday 10/30/2018 Symptoms:   Best Call Back Number:  [phone]  Additional Information:   Blood in stool yesterday.

## 2018-10-30 ENCOUNTER — DOCUMENTATION ONLY (OUTPATIENT)
Dept: FAMILY MEDICINE | Facility: CLINIC | Age: 78
End: 2018-10-30

## 2018-10-30 NOTE — PROGRESS NOTES
Pre-Visit Chart Review  For Appointment Scheduled on 10/30/18    Health Maintenance Due   Topic Date Due    Zoster Vaccine  09/27/1999

## 2018-10-31 ENCOUNTER — APPOINTMENT (OUTPATIENT)
Dept: LAB | Facility: HOSPITAL | Age: 78
End: 2018-10-31
Attending: UROLOGY
Payer: MEDICARE

## 2018-10-31 ENCOUNTER — EXTERNAL CHRONIC CARE MANAGEMENT (OUTPATIENT)
Dept: PRIMARY CARE CLINIC | Facility: CLINIC | Age: 78
End: 2018-10-31
Payer: MEDICARE

## 2018-10-31 ENCOUNTER — TELEPHONE (OUTPATIENT)
Dept: UROLOGY | Facility: CLINIC | Age: 78
End: 2018-10-31

## 2018-10-31 ENCOUNTER — TELEPHONE (OUTPATIENT)
Dept: CARDIOLOGY | Facility: CLINIC | Age: 78
End: 2018-10-31

## 2018-10-31 ENCOUNTER — OFFICE VISIT (OUTPATIENT)
Dept: UROLOGY | Facility: CLINIC | Age: 78
End: 2018-10-31
Payer: MEDICARE

## 2018-10-31 VITALS
BODY MASS INDEX: 28.02 KG/M2 | SYSTOLIC BLOOD PRESSURE: 137 MMHG | WEIGHT: 211.44 LBS | RESPIRATION RATE: 18 BRPM | HEIGHT: 73 IN | TEMPERATURE: 98 F | DIASTOLIC BLOOD PRESSURE: 70 MMHG | HEART RATE: 70 BPM

## 2018-10-31 DIAGNOSIS — R39.16 BENIGN PROSTATIC HYPERPLASIA (BPH) WITH STRAINING ON URINATION: ICD-10-CM

## 2018-10-31 DIAGNOSIS — R39.198 DIFFICULTY URINATING: Primary | ICD-10-CM

## 2018-10-31 DIAGNOSIS — R13.10 DYSPHAGIA: Primary | ICD-10-CM

## 2018-10-31 DIAGNOSIS — N40.1 BENIGN PROSTATIC HYPERPLASIA (BPH) WITH STRAINING ON URINATION: ICD-10-CM

## 2018-10-31 DIAGNOSIS — R39.11 URINARY HESITANCY: ICD-10-CM

## 2018-10-31 LAB
BILIRUB SERPL-MCNC: ABNORMAL MG/DL
BLOOD URINE, POC: 50
COLOR, POC UA: YELLOW
GLUCOSE UR QL STRIP: 100
KETONES UR QL STRIP: ABNORMAL
LEUKOCYTE ESTERASE URINE, POC: ABNORMAL
MICROSCOPIC COMMENT: NORMAL
NITRITE, POC UA: ABNORMAL
PH, POC UA: 6
POC RESIDUAL URINE VOLUME: 56 ML (ref 0–100)
PROTEIN, POC: ABNORMAL
RBC #/AREA URNS HPF: 2 /HPF (ref 0–4)
SPECIFIC GRAVITY, POC UA: 1.01
UROBILINOGEN, POC UA: ABNORMAL

## 2018-10-31 PROCEDURE — 99215 OFFICE O/P EST HI 40 MIN: CPT | Mod: PBBFAC,PN,25 | Performed by: NURSE PRACTITIONER

## 2018-10-31 PROCEDURE — 99999 PR PBB SHADOW E&M-EST. PATIENT-LVL V: CPT | Mod: PBBFAC,,, | Performed by: NURSE PRACTITIONER

## 2018-10-31 PROCEDURE — 81000 URINALYSIS NONAUTO W/SCOPE: CPT

## 2018-10-31 PROCEDURE — 51798 US URINE CAPACITY MEASURE: CPT | Mod: PBBFAC,PN | Performed by: NURSE PRACTITIONER

## 2018-10-31 PROCEDURE — 87086 URINE CULTURE/COLONY COUNT: CPT

## 2018-10-31 PROCEDURE — 99490 CHRNC CARE MGMT STAFF 1ST 20: CPT | Mod: PBBFAC,PO | Performed by: FAMILY MEDICINE

## 2018-10-31 PROCEDURE — 81002 URINALYSIS NONAUTO W/O SCOPE: CPT | Mod: PBBFAC,PN | Performed by: NURSE PRACTITIONER

## 2018-10-31 PROCEDURE — 99490 CHRNC CARE MGMT STAFF 1ST 20: CPT | Mod: S$PBB,,, | Performed by: FAMILY MEDICINE

## 2018-10-31 PROCEDURE — 99214 OFFICE O/P EST MOD 30 MIN: CPT | Mod: S$PBB,,, | Performed by: NURSE PRACTITIONER

## 2018-10-31 RX ORDER — CIPROFLOXACIN 500 MG/1
500 TABLET ORAL 2 TIMES DAILY
Qty: 14 TABLET | Refills: 0 | Status: ON HOLD | OUTPATIENT
Start: 2018-10-31 | End: 2018-11-09

## 2018-10-31 NOTE — TELEPHONE ENCOUNTER
----- Message from Nelsy Hugo LPN sent at 10/31/2018  2:26 PM CDT -----  Dr Kerry Dalal needs to do cystoscope with retrogrades on patient. He was last seen by Dr Dunlap in August and has follow up appt on 11/15/18. The patient would like to have done on 11/12/18. Can we proceed with it on 11/12 or would the MD like to see him first so that an   official clearance can be done ?

## 2018-10-31 NOTE — TELEPHONE ENCOUNTER
----- Message from Cecille Hess sent at 10/31/2018  1:04 PM CDT -----  Contact: Pt  Name of Who is Calling: Micheal      What is the request in detail: Patient is calling requesting to speak with a nurse      Can the clinic reply by [facility]: no      [facility] [phone] (home)

## 2018-10-31 NOTE — PROGRESS NOTES
"[facility] Note  Staff: CLEO Buenrostro    PCP: Dr. Pk Lakhani    Chief Complaint: Difficulty urinating    Subjective:        HPI: Micheal Hunt is a 78 y.o. male presents with recent onset of urinary hesitancy and frequency or urination more prominently during the night.    Pt was last seen by Dr. Payne on 10/12/2018 who started patient back on Flomax 0.4 mg daily.  BUT in talking with pt today, he never took the medication and states he will not take the medication due to his "bad kidneys".   Pt has hx of BPH, lower uirnary tract symptoms, hx of elevated PSA, renal insufficiency, and renal cyst.    HISTORY OF PRESENT ILLNESS:  This 78-year-old male returns for recheck.  He has been under the care of Dr. Herring for management of BPH with lower urinary tract symptoms and at the last visit with Dr. Herring on 04/27/2018, he had been placed on a trial of Flomax 0.4 mg p.o. daily for management of his BPH and lower urinary tract symptoms, but he states he only took it for two weeks and then he discontinued it because he was concerned about the potential side effects.    He has history of elevated PSA and he had undergone prostate ultrasound with biopsies in the past by Dr. Herring and there has never been any evidence of any malignancy.  His most recent PSA was 5.6 on 07/26/2018.  He does have significant lower tract symptoms with nocturia up to x6 last night.    Medical Changes:  After last office visit pt tripped over power cord and busted nose, bruising to face and injured left arm.     Bladder scan today revealed 56 mL of residual urine.     His last PSA was 5.6 on 07/26/2018.   Lab Results   Component Value Date    PSA 5.6 (H) 07/26/2018    PSA 4.01 (H) 06/03/2013    PSA 1.57 02/16/2012    PSADIAG 4.4 (H) 08/18/2017    PSADIAG 5.0 (H) 07/07/2015    PSADIAG 8.6 (H) 06/18/2015     ROS:  As noted above in HPI otherwise negative x 10 systems reviewed.      PMHx:  Past Medical " History:   Diagnosis Date    NICK (acute kidney injury) 7/29/2016    Allergy     Anemia, mild 12/15/2014    Arthritis     Gout    Benign essential HTN 3/27/2012    BMI 29.0-29.9,adult 5/10/2018    BPH (benign prostatic hyperplasia)     BPH (benign prostatic hyperplasia)     CAD (coronary artery disease) 2006    Chronic kidney disease     due to ibuprofen    Colon polyp     CRF (chronic renal failure), stage 5     Diverticulosis     Gastritis     GERD (gastroesophageal reflux disease)     Gout     History of colon polyps 5/3/2018    HTN (hypertension) 3/27/2012    Hyperlipidemia     Hyperlipidemia     LLL pneumonia 6/14/2018    LVH (left ventricular hypertrophy)     Mesenteric ischemia     Murmur, cardiac 3/27/2012    GRICELDA (obstructive sleep apnea)     DOES NOT USE A MACHINE    Sinus problem     Syncope and collapse      PSHx:  Past Surgical History:   Procedure Laterality Date    APPENDECTOMY      BLOCK-NERVE Left 5/31/2016    [facility]    CARDIAC CATHETERIZATION      COLONOSCOPY  2011    COLONOSCOPY N/A 5/3/2018    Procedure: COLONOSCOPY;  Surgeon: Messi Harris MD;  Location: [facility];  Service: Endoscopy;  Laterality: N/A;    COLONOSCOPY N/A 5/3/2018    [facility]    COLONOSCOPY N/A 9/10/2015    [facility] ENDO    CORONARY ARTERY BYPASS GRAFT  4/2007    x 1    CYSTOSCOPY N/A 8/30/2017    [facility] at [facility]    CYSTOSCOPY N/A 11/10/2015    [facility] at [facility]    ESOPHAGOGASTRODUODENOSCOPY (EGD) N/A 1/4/2016    [facility] (2ND F[facility])    ESOPHAGOGASTRODUODENOSCOPY (EGD) N/A 10/15/2014    Per[facility] by [facility] at [facility] ENDO    INJECTION-STEROID-EPIDURAL-TRANSFORAMINAL Left 2/25/2016    [facility] at [facility] OR    JOINT REPLACEMENT      left knee total replacement  X 3     mid leftt finger      from a cactuss    MOLE REMOVAL  2016    RADIOFREQUENCY THERMOCOAGULATION (RFTC)-NERVE-MEDIAN BRANCH-LUMBAR Left 4/11/2016    [facility]    rotative cuff      no rotative cuffs on bilat shoulders has pins     SHOULDER SURGERY      shoulder surgery bilat  RIGHT X 4; LEFT X 3    TRANSRECTAL ULTRASOUND GUIDED PROSTATE BIOPSY Bilateral 11/10/2015    [facility] at [facility] OR    ULTRASOUND-ENDOSCOPIC-UPPER N/A 5/31/2017    [facility] at [facility] ENDO (2ND FLR)    ULTRASOUND-ENDOSCOPIC-UPPER N/A 1/4/2016    Performed by Tra [facility] at [facility] ENDO (2ND FLR)    ULTRASOUND-ENDOSCOPIC-UPPER N/A 1/16/2015    Performed by Jason Saleem MD at [facility] ENDO (2ND FLR)     Allergies:  Ace inhibitors; Arb-angiotensin receptor antagonist; Eplerenone; and Sulfa (sulfonamide antibiotics)    Medications: reviewed   Anticoagulation: Yes - ECOTRIN 81 MG ONE TABLET DAILY    Objective:     Vitals:    10/31/18 0902   BP: 137/70   Pulse: 70   Resp: 18   Temp: 98 °F (36.7 °C)     General:WDWN in NAD  Eyes: PERRLA, normal conjunctiva  Respiratory: no increased work on breathing, clear to auscultation  Cardiovascular: regular rate and rhythm. No obvious extremity edema.  GI: palpation of masses. No tenderness. No hepatosplenomegaly to palpation.  Musculoskeletal: normal range of motion of bilateral upper extremities. Normal muscle strength and tone.  Skin: no obvious rashes or lesions. No tightening of skin noted.  Neurologic: CN grossly normal. Normal sensation.   Psychiatric: awake, alert and oriented x 3. Mood and affect normal. Cooperative.    LABS REVIEW:  UA today:  ABNORMAL  Cr:   Lab Results   Component Value Date    CREATININE 5.2 (H) 10/22/2018     Assessment:       1. Difficulty urinating    2. Benign prostatic hyperplasia (BPH) with straining on urination    3. Urinary hesitancy          Plan:   Nocturia, urinary hesitancy, BPH with  LUTS:    1.  UA Micro, urine culture and cytology to be performed.  2.  Cipro 500 mg 1 po BID x 7 days prescribed to pt today.    Due to pt refusing to take Flomax and any other new alpha-blocker medications at this time, I have encouraged him to go ahead and schedule the Cysto procedure with Dr. Payne that was previously recommended to him after last ov with MD.  Pt verbalized understanding.    [place]: Active    Barb Johnson, HOPEC

## 2018-10-31 NOTE — TELEPHONE ENCOUNTER
"----- Message from Getachew Dunlap MD sent at 10/31/2018  5:02 PM CDT -----  Patient have been seen within the last 6 months and is stable and asymptomatic from the cardiovascular standpoint they can be clear for surgery and anesthesia with acceptable cardiac risk. May be at increase risk for complications due to the patient's co-morbidities. In requard to holding the antiplatelet drugs or warfarin / Coumadin (ASA, Plavix /clopidogrel, Pletal /cilostazol, Brilinta /ticagrelor), if no hospitalization for acute coronary syndrome in the past 12 months, can hold for 5-7 days at the discretion of the surgeon. If the patient is on NOAC (Eliquis, Xarelto, Pradaxa) can hold for 1-2 days per surgeon discretion. Certain recommend resumption of these medications ASAP post procedure. If an official "clearance" is needed, then will need an office visit. Can proceed with procedure.    Thanks, Dr. Dunlap  ----- Message -----  From: Karolyn Andino LPN  Sent: 10/31/2018   4:49 PM  To: Getachew Dunlap MD        ----- Message -----  From: Karen Perera RN  Sent: 10/31/2018   3:33 PM  To: Getachew Dunlap MD, Karolyn Andino LPN    Hi Dr. Dunlap,    Mr. Hunt has a cysto with retrograde scheduled on November 12th. This case will involve anesthesia. Dr. Payne's office is asking if the patient can be cleared for the case on the 12th or if you need to see him first at his scheduled appointment on November 15th.    Thanks,    Karen Perera, ANTONIO  ----- Message -----  From: Nelsy Hugo LPN  Sent: 10/31/2018   2:26 PM  To: Dr Kerry Leon needs to do cystoscope with retrogrades on patient. He was last seen by Dr Dunlap in August and has follow up appt on 11/15/18. The patient would like to have done on 11/12/18. Can we proceed with it on 11/12 or would the MD like to see him first so that an   official clearance can be done ?      "

## 2018-10-31 NOTE — TELEPHONE ENCOUNTER
Spoke with patient, he states he wants to go ahead and schedule the cystoscope with retrogrades instead of seeing the MD first. Put on book for 11/12/18, informed will need clearance from cardio, Dr Dunlap. Informed pre-op will contact the week before and give time to be there, patient verbally understood.

## 2018-11-01 ENCOUNTER — OFFICE VISIT (OUTPATIENT)
Dept: HEMATOLOGY/ONCOLOGY | Facility: CLINIC | Age: 78
End: 2018-11-01
Payer: MEDICARE

## 2018-11-01 ENCOUNTER — HOSPITAL ENCOUNTER (OUTPATIENT)
Dept: RADIOLOGY | Facility: HOSPITAL | Age: 78
Discharge: HOME | End: 2018-11-01
Attending: INTERNAL MEDICINE
Payer: MEDICARE

## 2018-11-01 VITALS
TEMPERATURE: 99 F | HEIGHT: 73 IN | SYSTOLIC BLOOD PRESSURE: 129 MMHG | BODY MASS INDEX: 27.99 KG/M2 | RESPIRATION RATE: 22 BRPM | DIASTOLIC BLOOD PRESSURE: 66 MMHG | WEIGHT: 211.19 LBS | HEART RATE: 71 BPM

## 2018-11-01 DIAGNOSIS — D63.1 ANEMIA DUE TO END STAGE RENAL DISEASE: ICD-10-CM

## 2018-11-01 DIAGNOSIS — M51.36 DDD (DEGENERATIVE DISC DISEASE), LUMBAR: ICD-10-CM

## 2018-11-01 DIAGNOSIS — N18.5 CHRONIC KIDNEY DISEASE, STAGE 5: ICD-10-CM

## 2018-11-01 DIAGNOSIS — Z86.39 HISTORY OF IRON DEFICIENCY: ICD-10-CM

## 2018-11-01 DIAGNOSIS — R06.02 SOB (SHORTNESS OF BREATH): ICD-10-CM

## 2018-11-01 DIAGNOSIS — R09.89 RALES: ICD-10-CM

## 2018-11-01 DIAGNOSIS — N18.6 ANEMIA DUE TO END STAGE RENAL DISEASE: ICD-10-CM

## 2018-11-01 DIAGNOSIS — R05.9 COUGH: ICD-10-CM

## 2018-11-01 DIAGNOSIS — R05.9 COUGH: Primary | ICD-10-CM

## 2018-11-01 LAB — BACTERIA UR CULT: NO GROWTH

## 2018-11-01 PROCEDURE — 99213 OFFICE O/P EST LOW 20 MIN: CPT | Mod: PBBFAC,25,PO | Performed by: INTERNAL MEDICINE

## 2018-11-01 PROCEDURE — 99214 OFFICE O/P EST MOD 30 MIN: CPT | Mod: S$PBB,,, | Performed by: INTERNAL MEDICINE

## 2018-11-01 PROCEDURE — 99999 PR PBB SHADOW E&M-EST. PATIENT-LVL III: CPT | Mod: PBBFAC,,, | Performed by: INTERNAL MEDICINE

## 2018-11-01 PROCEDURE — 71046 X-RAY EXAM CHEST 2 VIEWS: CPT | Mod: 26,,, | Performed by: RADIOLOGY

## 2018-11-01 PROCEDURE — 71046 X-RAY EXAM CHEST 2 VIEWS: CPT | Mod: TC,FY

## 2018-11-01 NOTE — PROGRESS NOTES
CC : JENNIFER Hunt is a 78 y.o.   Since last visit Pt tripped over a cord and suffered with 2 black eyes and contusions on his face and his ribs hurt, He was hospitalized for such   Pt is here for evaluation of anemia with CKD.  He has received two more doses of procrit  His labs reveal continued anemia with a hemoglobin less than 10      he has been experiencing increasing shortness of breath now at rest and cough.   He has received 20,000 u of procrit in past and is here to check his Hb  AV graft in left arm : He is being set up for home dialysis      Past Medical History:   Diagnosis Date    NICK (acute kidney injury) 7/29/2016    Allergy     Anemia, mild 12/15/2014    Arthritis     Gout    Benign essential HTN 3/27/2012    BMI 29.0-29.9,adult 5/10/2018    BPH (benign prostatic hyperplasia)     BPH (benign prostatic hyperplasia)     CAD (coronary artery disease) 2006    Chronic kidney disease     due to ibuprofen    Colon polyp     CRF (chronic renal failure), stage 5     Diverticulosis     Gastritis     GERD (gastroesophageal reflux disease)     Gout     History of colon polyps 5/3/2018    HTN (hypertension) 3/27/2012    Hyperlipidemia     Hyperlipidemia     LLL pneumonia 6/14/2018    LVH (left ventricular hypertrophy)     Mesenteric ischemia     Murmur, cardiac 3/27/2012    GRICELDA (obstructive sleep apnea)     DOES NOT USE A MACHINE    Sinus problem     Syncope and collapse      He is tolerating norvasc for HTN  He is tolerating proscar which is helping with nocturia     Current Outpatient Medications:     albuterol 90 mcg/actuation inhaler, 2 puffs every 4 hours as needed for cough, wheeze, or shortness of breath, Disp: 3 Inhaler, Rfl: 3    albuterol-ipratropium (DUO-NEB) 2.5 mg-0.5 mg/3 mL nebulizer solution, INHALE 1 VIAL BY NEBULIZER EVERY 6 HOURS AS NEEDED FOR WHEEZING, Disp: 270 mL, Rfl: 0    allopurinol (ZYLOPRIM) 100 MG tablet, Take 1 tablet (100 mg total)  by mouth once daily., Disp: 90 tablet, Rfl: 1    amLODIPine (NORVASC) 10 MG tablet, Take 1 tablet (10 mg total) by mouth every evening., Disp: 90 tablet, Rfl: 1    aspirin (ECOTRIN) 81 MG EC tablet, Take 81 mg by mouth once daily.  , Disp: , Rfl:     atorvastatin (LIPITOR) 80 MG tablet, TAKE 1 TABLET (80 MG TOTAL) BY MOUTH ONCE DAILY., Disp: 90 tablet, Rfl: 3    carvedilol (COREG) 6.25 MG tablet, Take 1 tablet (6.25 mg total) by mouth 2 (two) times daily with meals., Disp: 60 tablet, Rfl: 3    COLCRYS 0.6 mg tablet, TAKE 1 TABLET (0.6 MG TOTAL) BY MOUTH ONCE DAILY., Disp: 30 tablet, Rfl: 1    cyclobenzaprine (FLEXERIL) 5 MG tablet, One tab po with breakfast and lunch take 2 tabs po before bedtime for up to 7 days, Disp: 30 tablet, Rfl: 0    finasteride (PROSCAR) 5 mg tablet, TAKE 1 TABLET ONCE DAILY., Disp: 90 tablet, Rfl: 3    fluticasone (FLONASE) 50 mcg/actuation nasal spray, 2 sprays (100 mcg total) by Each Nare route once daily. (Patient taking differently: 2 sprays by Each Nare route daily as needed. ), Disp: 3 Bottle, Rfl: 3    gabapentin (NEURONTIN) 300 MG capsule, Take 1 capsule (300 mg total) by mouth every evening., Disp: 90 capsule, Rfl: 3    iron polysaccharides (NIFEREX) 150 mg iron Cap, Take 1 capsule (150 mg total) by mouth 2 (two) times daily before meals., Disp: 60 capsule, Rfl: 11    isosorbide mononitrate (ISMO,MONOKET) 10 mg tablet, TAKE 1 TABLET BY MOUTH EVERY DAY IN THE EVENING, Disp: 30 tablet, Rfl: 3    meclizine (ANTIVERT) 25 mg tablet, TAKE 1 TABLET (25 MG TOTAL) BY MOUTH 3 (THREE) TIMES DAILY AS NEEDED., Disp: 90 tablet, Rfl: 0    montelukast (SINGULAIR) 10 mg tablet, Take 1 tablet (10 mg total) by mouth every evening., Disp: 90 tablet, Rfl: 1    NITROSTAT 0.4 mg SL tablet, PLACE 1 TABLET (0.4 MG TOTAL) UNDER THE TONGUE EVERY 5 (FIVE) MINUTES AS NEEDED FOR CHEST PAIN., Disp: 25 tablet, Rfl: 3    omeprazole (PRILOSEC) 20 MG capsule, Take 1 capsule (20 mg total) by mouth once  "daily., Disp: 90 capsule, Rfl: 1    oxyCODONE-acetaminophen (PERCOCET) 5-325 mg per tablet, Take 1 tablet by mouth every 6 (six) hours as needed for Pain., Disp: 14 tablet, Rfl: 0    polyethylene glycol (GLYCOLAX) 17 gram/dose powder, Take 17 g by mouth 2 (two) times daily before meals., Disp: 1 Bottle, Rfl: 4    sodium chloride (SALINE NASAL MIST) 3 % Mist, 1 spray by Nasal route 2 (two) times daily., Disp: , Rfl:     traMADol (ULTRAM) 50 mg tablet, Take 1 tablet (50 mg total) by mouth every 12 (twelve) hours as needed for Pain., Disp: 60 tablet, Rfl: 2    ciprofloxacin HCl (CIPRO) 500 MG tablet, Take 1 tablet (500 mg total) by mouth 2 (two) times daily. for 7 days, Disp: 14 tablet, Rfl: 0     He is tolerating Diovan for hypertension continues taking Ambien to help with insomnia    CONSTITUTIONAL: No fevers, chills, night sweats, + fatigue   SKIN: no rashes or itching  ENT: No headaches, head trauma, vision changes, or eye pain  LYMPH NODES: None noticed   CV: No chest pain, palpitations.   RESP:+ dyspnea on exertion,no cough, wheezing, or hemoptysis  GI: No nausea, emesis, diarrhea, constipation, melena, hematochezia, pain.   : No dysuria, hematuria, urgency, or frequency   HEME: No easy bruising, bleeding problems  PSYCHIATRIC: No depression, anxiety, psychosis, hallucinations.  NEURO: No seizures, memory loss, Intermittent dizziness  No difficulty sleeping  MSK: No arthralgias or joint swelling    Depression screening positive today  He is tired and states he is tired of being sick        /66   Pulse 71   Temp 98.5 °F (36.9 °C)   Resp (!) 22   Ht 6' 1" (1.854 m)   Wt 95.8 kg (211 lb 3.2 oz)   BMI 27.86 kg/m²     Constitutional: oriented to person, place, and time.    HENT:   Head: bilateral bruises periorbital , cut over his lip   Right Ear: External ear normal.   Left Ear: External ear normal.   Nose: Nose normal.   Mouth/Throat: Oropharynx is clear and moist.   Eyes: Conjunctivae and EOM are " normal. Pupils are equal, round, and reactive to light.   Neck: Normal range of motion. Neck supple. No thyromegaly present.   Cardiovascular: Normal rate, regular rhythm, normal heart sounds and intact distal pulses.    ++ murmur heard.  Pulmonary/Chest: Decreased breath sounds base   no wheezes.  no rales.   Abdominal: Soft. Bowel sounds are normal.  no mass. There is no tenderness. There is no rebound.   Musculoskeletal:   no edema  + limited ROM and swelling of left wrist tenderness.   Lymphadenopathy:    no cervical adenopathy.   Neurological: alert and oriented to person, place, and time. normal reflexes. No cranial nerve deficit.   Skin: Skin is warm and dry. No rash noted.   Psychiatric: normal mood and affect.   behavior is normal.   Vitals reviewed.    Lab Results   Component Value Date    WBC 8.30 10/24/2018    HGB 10.5 (L) 10/24/2018    HCT 31.8 (L) 10/24/2018    MCV 91 10/24/2018     10/24/2018     CMP  Sodium   Date Value Ref Range Status   10/22/2018 132 (L) 136 - 145 mmol/L Final     Potassium   Date Value Ref Range Status   10/22/2018 3.6 3.5 - 5.1 mmol/L Final     Chloride   Date Value Ref Range Status   10/22/2018 106 95 - 110 mmol/L Final     CO2   Date Value Ref Range Status   10/22/2018 16 (L) 23 - 29 mmol/L Final     Glucose   Date Value Ref Range Status   10/22/2018 154 (H) 70 - 110 mg/dL Final     BUN, Bld   Date Value Ref Range Status   10/22/2018 81 (H) 8 - 23 mg/dL Final     Creatinine   Date Value Ref Range Status   10/22/2018 5.2 (H) 0.5 - 1.4 mg/dL Final   08/08/2013 1.5 (H) 0.5 - 1.4 mg/dL Final     Calcium   Date Value Ref Range Status   10/22/2018 7.4 (L) 8.7 - 10.5 mg/dL Final   08/08/2013 9.0 8.7 - 10.5 mg/dL Final     Total Protein   Date Value Ref Range Status   10/21/2018 7.1 6.0 - 8.4 g/dL Final     Albumin   Date Value Ref Range Status   10/21/2018 4.0 3.5 - 5.2 g/dL Final   09/12/2013 4.3 3.6 - 5.1 g/dL Final     Comment:     @ Test Performed By:  [employer]  [facility]  JIGNESH Hernandez M.D..,   [address] [place] [address]  [facility] #[id number]     Total Bilirubin   Date Value Ref Range Status   10/21/2018 0.4 0.1 - 1.0 mg/dL Final     Comment:     For infants and newborns, interpretation of results should be based  on gestational age, weight and in agreement with clinical  observations.  Premature Infant recommended reference ranges:  Up to 24 hours.............<8.0 mg/dL  Up to 48 hours............<12.0 mg/dL  3-5 days..................<15.0 mg/dL  6-29 days.................<15.0 mg/dL       Alkaline Phosphatase   Date Value Ref Range Status   10/21/2018 93 55 - 135 U/L Final     AST   Date Value Ref Range Status   10/21/2018 32 10 - 40 U/L Final     ALT   Date Value Ref Range Status   10/21/2018 24 10 - 44 U/L Final     Anion Gap   Date Value Ref Range Status   10/22/2018 10 8 - 16 mmol/L Final   08/08/2013 11 5 - 15 meq/L Final     eGFR if    Date Value Ref Range Status   10/22/2018 11 (A) >60 mL/min/1.73 m^2 Final     eGFR if non    Date Value Ref Range Status   10/22/2018 10 (A) >60 mL/min/1.73 m^2 Final     Comment:     Calculation used to obtain the estimated glomerular filtration  rate (eGFR) is the CKD-EPI equation.            Cough  -     X-Ray Chest PA And Lateral; Future; Expected date: 11/01/2018  -     CBC auto differential; Future; Expected date: 11/01/2018  -     Iron and TIBC; Future; Expected date: 11/01/2018  -     CMP; Future; Expected date: 11/01/2018    Rales  -     X-Ray Chest PA And Lateral; Future; Expected date: 11/01/2018  -     CBC auto differential; Future; Expected date: 11/01/2018  -     Iron and TIBC; Future; Expected date: 11/01/2018  -     CMP; Future; Expected date: 11/01/2018    SOB (shortness of breath)  -     X-Ray Chest PA And Lateral; Future; Expected date: 11/01/2018  -     CBC auto differential; Future; Expected date: 11/01/2018  -     Iron and TIBC;  Future; Expected date: 11/01/2018  -     CMP; Future; Expected date: 11/01/2018    Anemia due to end stage renal disease  -     X-Ray Chest PA And Lateral; Future; Expected date: 11/01/2018  -     CBC auto differential; Future; Expected date: 11/01/2018  -     Iron and TIBC; Future; Expected date: 11/01/2018  -     CMP; Future; Expected date: 11/01/2018    History of iron deficiency  -     X-Ray Chest PA And Lateral; Future; Expected date: 11/01/2018  -     CBC auto differential; Future; Expected date: 11/01/2018  -     Iron and TIBC; Future; Expected date: 11/01/2018  -     CMP; Future; Expected date: 11/01/2018    DDD (degenerative disc disease), lumbar    Chronic kidney disease, stage 5        Not on dialysis    To start dialysis at home  Sending for CXR to rule out pneumonia as he has rales on exam with SOB and increasing cough   Symptomatic anemia with dizziness and progressing SOB    No more does of procrit at this time as his Hb is above ten: data shown to him that procrit to resume once Hb falls to less than 10   Continue  B12 1000 mcg SL daily  He is to continue usual medications for hypertension being Norvasc  Continue Neurontin for intermittent paresthesias   Call with xray results   Thank you for allowing me to evaluate and participate in the care of this pleasant patient. Please fell free to call me with any questions or concerns.    Mikala Frankel MD    This note was dictated with Dragon and briefly proofread. Please excuse any sentences that may be unclear or words misspelled

## 2018-11-02 ENCOUNTER — TELEPHONE (OUTPATIENT)
Dept: FAMILY MEDICINE | Facility: CLINIC | Age: 78
End: 2018-11-02

## 2018-11-02 ENCOUNTER — TELEPHONE (OUTPATIENT)
Dept: UROLOGY | Facility: CLINIC | Age: 78
End: 2018-11-02

## 2018-11-02 NOTE — TELEPHONE ENCOUNTER
Patient is requesting rx for tamsulosin. There is not an active rx for this medication. Please advise. Thank you.        ----- Message from Laly Palafox sent at 11/2/2018  4:32 PM CDT -----  Contact: Khushi from [facility]  Type:  RX Refill Request    Who Called:  Khushi from [facility]  Refill or New Rx:  refill  RX Name and Strength:  flomax  How is the patient currently taking it? (ex. 1XDay):  na  Is this a 30 day or 90 day RX:  na  Preferred Pharmacy with phone number:    [facility] #[id number] - [place], [place] - [address] AT [facility]  [address]  [place] [address]  Phone: [phone] Fax: [phone]     Local or Mail Order:  local  Ordering Provider:  Dr. Nima Nielsen Call Back Number:  [phone]  Additional Information:  Khushi says that the prescription was discontinued but the patient states that he needs it filled

## 2018-11-05 ENCOUNTER — HOSPITAL ENCOUNTER (INPATIENT)
Facility: HOSPITAL | Age: 78
LOS: 3 days | Discharge: HOME HEALTH | DRG: 193 | End: 2018-11-09
Attending: EMERGENCY MEDICINE | Admitting: HOSPITALIST
Payer: MEDICARE

## 2018-11-05 ENCOUNTER — OFFICE VISIT (OUTPATIENT)
Dept: FAMILY MEDICINE | Facility: CLINIC | Age: 78
End: 2018-11-05
Payer: MEDICARE

## 2018-11-05 VITALS
SYSTOLIC BLOOD PRESSURE: 122 MMHG | OXYGEN SATURATION: 98 % | RESPIRATION RATE: 17 BRPM | TEMPERATURE: 98 F | HEART RATE: 75 BPM | BODY MASS INDEX: 28.75 KG/M2 | WEIGHT: 216.94 LBS | DIASTOLIC BLOOD PRESSURE: 64 MMHG | HEIGHT: 73 IN

## 2018-11-05 DIAGNOSIS — J18.9 HCAP (HEALTHCARE-ASSOCIATED PNEUMONIA): Primary | ICD-10-CM

## 2018-11-05 DIAGNOSIS — D80.9 IMMUNOGLOBULIN DEFICIENCY: ICD-10-CM

## 2018-11-05 DIAGNOSIS — G47.00 INSOMNIA, UNSPECIFIED TYPE: ICD-10-CM

## 2018-11-05 DIAGNOSIS — I50.9 CHF (CONGESTIVE HEART FAILURE): ICD-10-CM

## 2018-11-05 DIAGNOSIS — N40.1 BPH WITH OBSTRUCTION/LOWER URINARY TRACT SYMPTOMS: ICD-10-CM

## 2018-11-05 DIAGNOSIS — R07.9 CHEST PAIN: ICD-10-CM

## 2018-11-05 DIAGNOSIS — J45.901 MILD ASTHMA WITH ACUTE EXACERBATION, UNSPECIFIED WHETHER PERSISTENT: ICD-10-CM

## 2018-11-05 DIAGNOSIS — R04.2 HEMOPTYSIS: ICD-10-CM

## 2018-11-05 DIAGNOSIS — N13.8 BPH WITH OBSTRUCTION/LOWER URINARY TRACT SYMPTOMS: ICD-10-CM

## 2018-11-05 DIAGNOSIS — F32.A DEPRESSION, UNSPECIFIED DEPRESSION TYPE: Primary | ICD-10-CM

## 2018-11-05 DIAGNOSIS — I10 ESSENTIAL HYPERTENSION: ICD-10-CM

## 2018-11-05 DIAGNOSIS — N18.5 CRF (CHRONIC RENAL FAILURE), STAGE 5: ICD-10-CM

## 2018-11-05 LAB
ALBUMIN SERPL BCP-MCNC: 3.5 G/DL
ALP SERPL-CCNC: 78 U/L
ALT SERPL W/O P-5'-P-CCNC: 35 U/L
ANION GAP SERPL CALC-SCNC: 12 MMOL/L
AST SERPL-CCNC: 23 U/L
BACTERIA #/AREA URNS HPF: NORMAL /HPF
BASOPHILS # BLD AUTO: 0 K/UL
BASOPHILS NFR BLD: 0 %
BILIRUB SERPL-MCNC: 0.3 MG/DL
BILIRUB UR QL STRIP: NEGATIVE
BUN SERPL-MCNC: 81 MG/DL
CALCIUM SERPL-MCNC: 7.9 MG/DL
CHLORIDE SERPL-SCNC: 109 MMOL/L
CLARITY UR: CLEAR
CO2 SERPL-SCNC: 14 MMOL/L
COLOR UR: YELLOW
CREAT SERPL-MCNC: 5.5 MG/DL
DIFFERENTIAL METHOD: ABNORMAL
EOSINOPHIL # BLD AUTO: 0.1 K/UL
EOSINOPHIL NFR BLD: 1.3 %
ERYTHROCYTE [DISTWIDTH] IN BLOOD BY AUTOMATED COUNT: 16.5 %
EST. GFR  (AFRICAN AMERICAN): 10 ML/MIN/1.73 M^2
EST. GFR  (NON AFRICAN AMERICAN): 9 ML/MIN/1.73 M^2
GLUCOSE SERPL-MCNC: 93 MG/DL
GLUCOSE UR QL STRIP: NEGATIVE
HCT VFR BLD AUTO: 28.8 %
HGB BLD-MCNC: 9.6 G/DL
HGB UR QL STRIP: ABNORMAL
HYALINE CASTS #/AREA URNS LPF: 0 /LPF
KETONES UR QL STRIP: NEGATIVE
LEUKOCYTE ESTERASE UR QL STRIP: NEGATIVE
LYMPHOCYTES # BLD AUTO: 1.1 K/UL
LYMPHOCYTES NFR BLD: 10.3 %
MCH RBC QN AUTO: 30.4 PG
MCHC RBC AUTO-ENTMCNC: 33.4 G/DL
MCV RBC AUTO: 91 FL
MICROSCOPIC COMMENT: NORMAL
MONOCYTES # BLD AUTO: 0.6 K/UL
MONOCYTES NFR BLD: 5.9 %
NEUTROPHILS # BLD AUTO: 8.5 K/UL
NEUTROPHILS NFR BLD: 82.5 %
NITRITE UR QL STRIP: NEGATIVE
PH UR STRIP: 6 [PH] (ref 5–8)
PLATELET # BLD AUTO: 143 K/UL
PMV BLD AUTO: 8.4 FL
POTASSIUM SERPL-SCNC: 4.2 MMOL/L
PROT SERPL-MCNC: 6.5 G/DL
PROT UR QL STRIP: ABNORMAL
RBC # BLD AUTO: 3.17 M/UL
RBC #/AREA URNS HPF: 1 /HPF (ref 0–4)
SODIUM SERPL-SCNC: 135 MMOL/L
SP GR UR STRIP: 1.01 (ref 1–1.03)
URN SPEC COLLECT METH UR: ABNORMAL
UROBILINOGEN UR STRIP-ACNC: NEGATIVE EU/DL
WBC # BLD AUTO: 10.3 K/UL
WBC #/AREA URNS HPF: 0 /HPF (ref 0–5)

## 2018-11-05 PROCEDURE — 99285 EMERGENCY DEPT VISIT HI MDM: CPT | Mod: 25,27

## 2018-11-05 PROCEDURE — 99214 OFFICE O/P EST MOD 30 MIN: CPT | Mod: S$PBB,,, | Performed by: PHYSICIAN ASSISTANT

## 2018-11-05 PROCEDURE — 85025 COMPLETE CBC W/AUTO DIFF WBC: CPT

## 2018-11-05 PROCEDURE — 99215 OFFICE O/P EST HI 40 MIN: CPT | Mod: PBBFAC,PO,25 | Performed by: PHYSICIAN ASSISTANT

## 2018-11-05 PROCEDURE — 99999 PR PBB SHADOW E&M-EST. PATIENT-LVL V: CPT | Mod: PBBFAC,,, | Performed by: PHYSICIAN ASSISTANT

## 2018-11-05 PROCEDURE — 81000 URINALYSIS NONAUTO W/SCOPE: CPT

## 2018-11-05 PROCEDURE — 80053 COMPREHEN METABOLIC PANEL: CPT

## 2018-11-05 PROCEDURE — 36415 COLL VENOUS BLD VENIPUNCTURE: CPT

## 2018-11-05 RX ORDER — MIRTAZAPINE 7.5 MG/1
7.5 TABLET, FILM COATED ORAL NIGHTLY
Qty: 30 TABLET | Refills: 2 | Status: SHIPPED | OUTPATIENT
Start: 2018-11-05 | End: 2019-01-27 | Stop reason: SDUPTHER

## 2018-11-05 RX ORDER — TAMSULOSIN HYDROCHLORIDE 0.4 MG/1
0.4 CAPSULE ORAL DAILY
Qty: 30 CAPSULE | Refills: 2 | Status: SHIPPED | OUTPATIENT
Start: 2018-11-05 | End: 2019-02-06 | Stop reason: SDUPTHER

## 2018-11-05 RX ORDER — LOSARTAN POTASSIUM 100 MG/1
100 TABLET ORAL DAILY
Qty: 90 TABLET | Refills: 0 | Status: SHIPPED | OUTPATIENT
Start: 2018-11-05 | End: 2019-01-31 | Stop reason: SDUPTHER

## 2018-11-05 NOTE — PROGRESS NOTES
Subjective:       Patient ID: Micheal Hunt is a 78 y.o. male.    Chief Complaint: Follow-up (B/P) and Depression    Mr. Hunt comes to clinic today for follow up. He does admit to difficulty sleeping and depression symptoms. The patient reports that he is depressed due to chronic pain due to arthralgia. The patient reports he is also having a procedure to get set up for peritoneal dialysis. The patient also has a cystoscope with retrogrades scheduled with Dr. Payne on November 12. The patient had a traumatic fall recently which resulted in a nasal fracture. The patient also had a recent hospitalization due to enteritis. The patient reports that his grandchildren have recently had a stomach virus. The patient       Review of Systems   Constitutional: Negative for activity change, appetite change and fever.   HENT: Negative for postnasal drip, rhinorrhea and sinus pressure.    Eyes: Negative for visual disturbance.   Respiratory: Negative for cough, shortness of breath and wheezing.    Cardiovascular: Negative for chest pain.   Gastrointestinal: Negative for abdominal distention and abdominal pain.   Genitourinary: Negative for difficulty urinating and dysuria.   Musculoskeletal: Positive for arthralgias, back pain and myalgias.   Neurological: Negative for headaches.   Hematological: Negative for adenopathy.   Psychiatric/Behavioral: The patient is not nervous/anxious.        Objective:      Physical Exam   Constitutional: He is oriented to person, place, and time.   HENT:   Mouth/Throat: Oropharynx is clear and moist. No oropharyngeal exudate.   Eyes: Conjunctivae are normal. Pupils are equal, round, and reactive to light.   Cardiovascular: Normal rate and regular rhythm.   Pulmonary/Chest: Effort normal and breath sounds normal. He has no wheezes.   Abdominal: Soft. Bowel sounds are normal. There is no tenderness.   Musculoskeletal: He exhibits no edema.   Lymphadenopathy:     He has no cervical  adenopathy.   Neurological: He is alert and oriented to person, place, and time.   Skin: No erythema.   Psychiatric: His behavior is normal.       Assessment:       1. Depression, unspecified depression type    2. Insomnia, unspecified type        Plan:   Micheal was seen today for follow-up and depression.    Diagnoses and all orders for this visit:    Depression, unspecified depression type  -     mirtazapine (REMERON) 7.5 MG Tab; Take 1 tablet (7.5 mg total) by mouth every evening.  Trial of low dose remeron  Insomnia, unspecified type  -     mirtazapine (REMERON) 7.5 MG Tab; Take 1 tablet (7.5 mg total) by mouth every evening.  Trial of low dose remeron  BPH with obstruction/lower urinary tract symptoms  -     tamsulosin (FLOMAX) 0.4 mg Cap; Take 1 capsule (0.4 mg total) by mouth once daily.  Continue proscar  Follow up with Dr. Payne, urologist. Procedure for cystoscope scheduled November 12th  CRF (chronic renal failure), stage 5  Continue follow up with Dr. Rojo  Essential hypertension  -     losartan (COZAAR) 100 MG tablet; Take 1 tablet (100 mg total) by mouth once daily.  Controlled  Low salt diet  Continue current medication.     Follow up with me as scheduled on December 12th. Follow up sooner if needed.

## 2018-11-06 ENCOUNTER — PATIENT MESSAGE (OUTPATIENT)
Dept: FAMILY MEDICINE | Facility: CLINIC | Age: 78
End: 2018-11-06

## 2018-11-06 ENCOUNTER — PATIENT MESSAGE (OUTPATIENT)
Dept: HEMATOLOGY/ONCOLOGY | Facility: CLINIC | Age: 78
End: 2018-11-06

## 2018-11-06 PROBLEM — R04.2 HEMOPTYSIS: Status: ACTIVE | Noted: 2018-11-06

## 2018-11-06 PROBLEM — D80.9 IMMUNOGLOBULIN DEFICIENCY: Status: ACTIVE | Noted: 2018-11-06

## 2018-11-06 PROBLEM — R53.81 DEBILITY: Status: ACTIVE | Noted: 2018-11-06

## 2018-11-06 PROBLEM — N40.0 BENIGN PROSTATIC HYPERPLASIA WITHOUT LOWER URINARY TRACT SYMPTOMS: Status: ACTIVE | Noted: 2017-08-30

## 2018-11-06 PROBLEM — J18.9 HCAP (HEALTHCARE-ASSOCIATED PNEUMONIA): Status: ACTIVE | Noted: 2018-11-06

## 2018-11-06 PROBLEM — E83.51 HYPOCALCEMIA: Status: ACTIVE | Noted: 2018-11-06

## 2018-11-06 PROBLEM — E44.1 MILD MALNUTRITION: Status: ACTIVE | Noted: 2018-11-06

## 2018-11-06 PROBLEM — N18.5 ANEMIA DUE TO STAGE 5 CHRONIC KIDNEY DISEASE, NOT ON CHRONIC DIALYSIS: Status: ACTIVE | Noted: 2018-02-25

## 2018-11-06 PROBLEM — E87.1 HYPONATREMIA: Status: ACTIVE | Noted: 2018-11-06

## 2018-11-06 LAB
ALBUMIN SERPL BCP-MCNC: 3.2 G/DL
ALP SERPL-CCNC: 71 U/L
ALT SERPL W/O P-5'-P-CCNC: 32 U/L
ANION GAP SERPL CALC-SCNC: 8 MMOL/L
AST SERPL-CCNC: 16 U/L
BASOPHILS # BLD AUTO: 0 K/UL
BASOPHILS NFR BLD: 0.4 %
BILIRUB SERPL-MCNC: 0.4 MG/DL
BUN SERPL-MCNC: 77 MG/DL
CALCIUM SERPL-MCNC: 7.7 MG/DL
CHLORIDE SERPL-SCNC: 109 MMOL/L
CO2 SERPL-SCNC: 18 MMOL/L
CREAT SERPL-MCNC: 5.5 MG/DL
DIFFERENTIAL METHOD: ABNORMAL
EOSINOPHIL # BLD AUTO: 0.1 K/UL
EOSINOPHIL NFR BLD: 0.8 %
ERYTHROCYTE [DISTWIDTH] IN BLOOD BY AUTOMATED COUNT: 16.3 %
EST. GFR  (AFRICAN AMERICAN): 10 ML/MIN/1.73 M^2
EST. GFR  (NON AFRICAN AMERICAN): 9 ML/MIN/1.73 M^2
GLUCOSE SERPL-MCNC: 105 MG/DL
HCT VFR BLD AUTO: 26.7 %
HGB BLD-MCNC: 9 G/DL
LYMPHOCYTES # BLD AUTO: 1.3 K/UL
LYMPHOCYTES NFR BLD: 14.6 %
MAGNESIUM SERPL-MCNC: 2.2 MG/DL
MCH RBC QN AUTO: 30.6 PG
MCHC RBC AUTO-ENTMCNC: 33.6 G/DL
MCV RBC AUTO: 91 FL
MONOCYTES # BLD AUTO: 0.5 K/UL
MONOCYTES NFR BLD: 6 %
NEUTROPHILS # BLD AUTO: 7.2 K/UL
NEUTROPHILS NFR BLD: 78.2 %
PHOSPHATE SERPL-MCNC: 3.7 MG/DL
PLATELET # BLD AUTO: 126 K/UL
PMV BLD AUTO: 8.8 FL
POTASSIUM SERPL-SCNC: 3.9 MMOL/L
PROCALCITONIN SERPL IA-MCNC: 0.18 NG/ML
PROT SERPL-MCNC: 5.9 G/DL
RBC # BLD AUTO: 2.93 M/UL
SODIUM SERPL-SCNC: 135 MMOL/L
WBC # BLD AUTO: 9.2 K/UL

## 2018-11-06 PROCEDURE — 99900035 HC TECH TIME PER 15 MIN (STAT)

## 2018-11-06 PROCEDURE — G8978 MOBILITY CURRENT STATUS: HCPCS | Mod: CJ

## 2018-11-06 PROCEDURE — 25000003 PHARM REV CODE 250: Performed by: EMERGENCY MEDICINE

## 2018-11-06 PROCEDURE — G8980 MOBILITY D/C STATUS: HCPCS | Mod: CJ

## 2018-11-06 PROCEDURE — 82784 ASSAY IGA/IGD/IGG/IGM EACH: CPT

## 2018-11-06 PROCEDURE — 80053 COMPREHEN METABOLIC PANEL: CPT

## 2018-11-06 PROCEDURE — 85025 COMPLETE CBC W/AUTO DIFF WBC: CPT

## 2018-11-06 PROCEDURE — 96365 THER/PROPH/DIAG IV INF INIT: CPT

## 2018-11-06 PROCEDURE — 94761 N-INVAS EAR/PLS OXIMETRY MLT: CPT

## 2018-11-06 PROCEDURE — 63600175 PHARM REV CODE 636 W HCPCS: Performed by: EMERGENCY MEDICINE

## 2018-11-06 PROCEDURE — 25000003 PHARM REV CODE 250: Performed by: NURSE PRACTITIONER

## 2018-11-06 PROCEDURE — 99223 1ST HOSP IP/OBS HIGH 75: CPT | Mod: ,,, | Performed by: INTERNAL MEDICINE

## 2018-11-06 PROCEDURE — 25000242 PHARM REV CODE 250 ALT 637 W/ HCPCS: Performed by: INTERNAL MEDICINE

## 2018-11-06 PROCEDURE — 80202 ASSAY OF VANCOMYCIN: CPT

## 2018-11-06 PROCEDURE — 97161 PT EVAL LOW COMPLEX 20 MIN: CPT

## 2018-11-06 PROCEDURE — 25000003 PHARM REV CODE 250: Performed by: HOSPITALIST

## 2018-11-06 PROCEDURE — G8988 SELF CARE GOAL STATUS: HCPCS | Mod: CI

## 2018-11-06 PROCEDURE — 25000242 PHARM REV CODE 250 ALT 637 W/ HCPCS: Performed by: NURSE PRACTITIONER

## 2018-11-06 PROCEDURE — G8987 SELF CARE CURRENT STATUS: HCPCS | Mod: CI

## 2018-11-06 PROCEDURE — G8979 MOBILITY GOAL STATUS: HCPCS | Mod: CJ

## 2018-11-06 PROCEDURE — 12000002 HC ACUTE/MED SURGE SEMI-PRIVATE ROOM

## 2018-11-06 PROCEDURE — 97165 OT EVAL LOW COMPLEX 30 MIN: CPT

## 2018-11-06 PROCEDURE — 83735 ASSAY OF MAGNESIUM: CPT

## 2018-11-06 PROCEDURE — 36415 COLL VENOUS BLD VENIPUNCTURE: CPT

## 2018-11-06 PROCEDURE — G8989 SELF CARE D/C STATUS: HCPCS | Mod: CI

## 2018-11-06 PROCEDURE — 63600175 PHARM REV CODE 636 W HCPCS: Performed by: NURSE PRACTITIONER

## 2018-11-06 PROCEDURE — 84145 PROCALCITONIN (PCT): CPT

## 2018-11-06 PROCEDURE — 96375 TX/PRO/DX INJ NEW DRUG ADDON: CPT

## 2018-11-06 PROCEDURE — 84100 ASSAY OF PHOSPHORUS: CPT

## 2018-11-06 PROCEDURE — 94640 AIRWAY INHALATION TREATMENT: CPT

## 2018-11-06 RX ORDER — MONTELUKAST SODIUM 10 MG/1
10 TABLET ORAL NIGHTLY
Status: DISCONTINUED | OUTPATIENT
Start: 2018-11-06 | End: 2018-11-09 | Stop reason: HOSPADM

## 2018-11-06 RX ORDER — ALLOPURINOL 100 MG/1
100 TABLET ORAL DAILY
Status: DISCONTINUED | OUTPATIENT
Start: 2018-11-06 | End: 2018-11-09 | Stop reason: HOSPADM

## 2018-11-06 RX ORDER — ONDANSETRON 2 MG/ML
8 INJECTION INTRAMUSCULAR; INTRAVENOUS EVERY 8 HOURS PRN
Status: DISCONTINUED | OUTPATIENT
Start: 2018-11-06 | End: 2018-11-09 | Stop reason: HOSPADM

## 2018-11-06 RX ORDER — IPRATROPIUM BROMIDE AND ALBUTEROL SULFATE 2.5; .5 MG/3ML; MG/3ML
3 SOLUTION RESPIRATORY (INHALATION) EVERY 8 HOURS
Status: DISCONTINUED | OUTPATIENT
Start: 2018-11-06 | End: 2018-11-07

## 2018-11-06 RX ORDER — AMLODIPINE BESYLATE 5 MG/1
10 TABLET ORAL NIGHTLY
Status: DISCONTINUED | OUTPATIENT
Start: 2018-11-06 | End: 2018-11-09 | Stop reason: HOSPADM

## 2018-11-06 RX ORDER — ASPIRIN 81 MG/1
81 TABLET ORAL DAILY
Status: DISCONTINUED | OUTPATIENT
Start: 2018-11-06 | End: 2018-11-09 | Stop reason: HOSPADM

## 2018-11-06 RX ORDER — TAMSULOSIN HYDROCHLORIDE 0.4 MG/1
0.4 CAPSULE ORAL DAILY
Status: DISCONTINUED | OUTPATIENT
Start: 2018-11-06 | End: 2018-11-09 | Stop reason: HOSPADM

## 2018-11-06 RX ORDER — IBUPROFEN 200 MG
16 TABLET ORAL
Status: DISCONTINUED | OUTPATIENT
Start: 2018-11-06 | End: 2018-11-09 | Stop reason: HOSPADM

## 2018-11-06 RX ORDER — ISOSORBIDE MONONITRATE 10 MG/1
10 TABLET ORAL NIGHTLY
Status: DISCONTINUED | OUTPATIENT
Start: 2018-11-06 | End: 2018-11-09 | Stop reason: HOSPADM

## 2018-11-06 RX ORDER — CARVEDILOL 6.25 MG/1
6.25 TABLET ORAL 2 TIMES DAILY WITH MEALS
Status: DISCONTINUED | OUTPATIENT
Start: 2018-11-06 | End: 2018-11-09 | Stop reason: HOSPADM

## 2018-11-06 RX ORDER — ACETAMINOPHEN 325 MG/1
650 TABLET ORAL EVERY 6 HOURS PRN
Status: DISCONTINUED | OUTPATIENT
Start: 2018-11-06 | End: 2018-11-06

## 2018-11-06 RX ORDER — IPRATROPIUM BROMIDE AND ALBUTEROL SULFATE 2.5; .5 MG/3ML; MG/3ML
3 SOLUTION RESPIRATORY (INHALATION) EVERY 4 HOURS
Status: DISCONTINUED | OUTPATIENT
Start: 2018-11-06 | End: 2018-11-06

## 2018-11-06 RX ORDER — ATORVASTATIN CALCIUM 40 MG/1
80 TABLET, FILM COATED ORAL DAILY
Status: DISCONTINUED | OUTPATIENT
Start: 2018-11-06 | End: 2018-11-09 | Stop reason: HOSPADM

## 2018-11-06 RX ORDER — FINASTERIDE 5 MG/1
5 TABLET, FILM COATED ORAL DAILY
Status: DISCONTINUED | OUTPATIENT
Start: 2018-11-06 | End: 2018-11-09 | Stop reason: HOSPADM

## 2018-11-06 RX ORDER — ACETAMINOPHEN 500 MG
1000 TABLET ORAL EVERY 6 HOURS PRN
Status: DISCONTINUED | OUTPATIENT
Start: 2018-11-06 | End: 2018-11-09 | Stop reason: HOSPADM

## 2018-11-06 RX ORDER — FLUTICASONE PROPIONATE 50 MCG
2 SPRAY, SUSPENSION (ML) NASAL DAILY
Status: DISCONTINUED | OUTPATIENT
Start: 2018-11-06 | End: 2018-11-09 | Stop reason: HOSPADM

## 2018-11-06 RX ORDER — PANTOPRAZOLE SODIUM 40 MG/1
40 TABLET, DELAYED RELEASE ORAL DAILY
Status: DISCONTINUED | OUTPATIENT
Start: 2018-11-06 | End: 2018-11-09 | Stop reason: HOSPADM

## 2018-11-06 RX ORDER — ZOLPIDEM TARTRATE 5 MG/1
5 TABLET ORAL NIGHTLY PRN
Status: DISCONTINUED | OUTPATIENT
Start: 2018-11-06 | End: 2018-11-09 | Stop reason: HOSPADM

## 2018-11-06 RX ORDER — MIRTAZAPINE 7.5 MG/1
7.5 TABLET, FILM COATED ORAL NIGHTLY
Status: DISCONTINUED | OUTPATIENT
Start: 2018-11-06 | End: 2018-11-09 | Stop reason: HOSPADM

## 2018-11-06 RX ORDER — IBUPROFEN 200 MG
24 TABLET ORAL
Status: DISCONTINUED | OUTPATIENT
Start: 2018-11-06 | End: 2018-11-09 | Stop reason: HOSPADM

## 2018-11-06 RX ORDER — LOSARTAN POTASSIUM 25 MG/1
100 TABLET ORAL DAILY
Status: DISCONTINUED | OUTPATIENT
Start: 2018-11-06 | End: 2018-11-09 | Stop reason: HOSPADM

## 2018-11-06 RX ORDER — GLUCAGON 1 MG
1 KIT INJECTION
Status: DISCONTINUED | OUTPATIENT
Start: 2018-11-06 | End: 2018-11-09 | Stop reason: HOSPADM

## 2018-11-06 RX ORDER — TRAMADOL HYDROCHLORIDE 50 MG/1
50 TABLET ORAL EVERY 12 HOURS PRN
Status: DISCONTINUED | OUTPATIENT
Start: 2018-11-06 | End: 2018-11-06

## 2018-11-06 RX ORDER — GABAPENTIN 300 MG/1
300 CAPSULE ORAL NIGHTLY
Status: DISCONTINUED | OUTPATIENT
Start: 2018-11-06 | End: 2018-11-09 | Stop reason: HOSPADM

## 2018-11-06 RX ORDER — DOCUSATE SODIUM 100 MG/1
100 CAPSULE, LIQUID FILLED ORAL 2 TIMES DAILY PRN
Status: DISCONTINUED | OUTPATIENT
Start: 2018-11-06 | End: 2018-11-08

## 2018-11-06 RX ORDER — FERROUS SULFATE 325(65) MG
325 TABLET, DELAYED RELEASE (ENTERIC COATED) ORAL 2 TIMES DAILY
Status: DISCONTINUED | OUTPATIENT
Start: 2018-11-06 | End: 2018-11-09 | Stop reason: HOSPADM

## 2018-11-06 RX ORDER — SODIUM CHLORIDE 0.9 % (FLUSH) 0.9 %
5 SYRINGE (ML) INJECTION
Status: DISCONTINUED | OUTPATIENT
Start: 2018-11-06 | End: 2018-11-09 | Stop reason: HOSPADM

## 2018-11-06 RX ORDER — TRAMADOL HYDROCHLORIDE 50 MG/1
50 TABLET ORAL EVERY 8 HOURS PRN
Status: DISCONTINUED | OUTPATIENT
Start: 2018-11-06 | End: 2018-11-09 | Stop reason: HOSPADM

## 2018-11-06 RX ADMIN — TRAMADOL HYDROCHLORIDE 50 MG: 50 TABLET, FILM COATED ORAL at 04:11

## 2018-11-06 RX ADMIN — FERROUS SULFATE TAB EC 325 MG (65 MG FE EQUIVALENT) 325 MG: 325 (65 FE) TABLET DELAYED RESPONSE at 09:11

## 2018-11-06 RX ADMIN — IPRATROPIUM BROMIDE AND ALBUTEROL SULFATE 3 ML: .5; 3 SOLUTION RESPIRATORY (INHALATION) at 07:11

## 2018-11-06 RX ADMIN — TRAMADOL HYDROCHLORIDE 50 MG: 50 TABLET, FILM COATED ORAL at 09:11

## 2018-11-06 RX ADMIN — DOCUSATE SODIUM 100 MG: 100 CAPSULE, LIQUID FILLED ORAL at 01:11

## 2018-11-06 RX ADMIN — PIPERACILLIN AND TAZOBACTAM 2.25 G: 2; .25 INJECTION, POWDER, LYOPHILIZED, FOR SOLUTION INTRAVENOUS; PARENTERAL at 04:11

## 2018-11-06 RX ADMIN — ACETAMINOPHEN 1000 MG: 500 TABLET, FILM COATED ORAL at 04:11

## 2018-11-06 RX ADMIN — CARVEDILOL 6.25 MG: 6.25 TABLET, FILM COATED ORAL at 04:11

## 2018-11-06 RX ADMIN — AMLODIPINE BESYLATE 10 MG: 5 TABLET ORAL at 09:11

## 2018-11-06 RX ADMIN — ISOSORBIDE MONONITRATE 10 MG: 10 TABLET ORAL at 09:11

## 2018-11-06 RX ADMIN — PIPERACILLIN AND TAZOBACTAM 2.25 G: 2; .25 INJECTION, POWDER, LYOPHILIZED, FOR SOLUTION INTRAVENOUS; PARENTERAL at 09:11

## 2018-11-06 RX ADMIN — GABAPENTIN 300 MG: 300 CAPSULE ORAL at 09:11

## 2018-11-06 RX ADMIN — ZOLPIDEM TARTRATE 5 MG: 5 TABLET ORAL at 09:11

## 2018-11-06 RX ADMIN — TAMSULOSIN HYDROCHLORIDE 0.4 MG: 0.4 CAPSULE ORAL at 09:11

## 2018-11-06 RX ADMIN — FLUTICASONE PROPIONATE 100 MCG: 50 SPRAY, METERED NASAL at 04:11

## 2018-11-06 RX ADMIN — ACETAMINOPHEN 1000 MG: 500 TABLET, FILM COATED ORAL at 02:11

## 2018-11-06 RX ADMIN — ALLOPURINOL 100 MG: 100 TABLET ORAL at 09:11

## 2018-11-06 RX ADMIN — CARVEDILOL 6.25 MG: 6.25 TABLET, FILM COATED ORAL at 09:11

## 2018-11-06 RX ADMIN — IPRATROPIUM BROMIDE AND ALBUTEROL SULFATE 3 ML: .5; 3 SOLUTION RESPIRATORY (INHALATION) at 03:11

## 2018-11-06 RX ADMIN — PANTOPRAZOLE SODIUM 40 MG: 40 TABLET, DELAYED RELEASE ORAL at 09:11

## 2018-11-06 RX ADMIN — ATORVASTATIN CALCIUM 80 MG: 40 TABLET, FILM COATED ORAL at 09:11

## 2018-11-06 RX ADMIN — FINASTERIDE 5 MG: 5 TABLET, FILM COATED ORAL at 09:11

## 2018-11-06 RX ADMIN — MONTELUKAST SODIUM 10 MG: 10 TABLET, COATED ORAL at 09:11

## 2018-11-06 RX ADMIN — ASPIRIN 81 MG: 81 TABLET, COATED ORAL at 09:11

## 2018-11-06 RX ADMIN — DOCUSATE SODIUM 100 MG: 100 CAPSULE, LIQUID FILLED ORAL at 09:11

## 2018-11-06 RX ADMIN — VANCOMYCIN HYDROCHLORIDE 1250 MG: 750 INJECTION, POWDER, LYOPHILIZED, FOR SOLUTION INTRAVENOUS at 02:11

## 2018-11-06 RX ADMIN — Medication 1 CAPSULE: at 12:11

## 2018-11-06 RX ADMIN — DEXTROSE 3.38 G: 50 INJECTION, SOLUTION INTRAVENOUS at 01:11

## 2018-11-06 RX ADMIN — LOSARTAN POTASSIUM 100 MG: 25 TABLET ORAL at 09:11

## 2018-11-06 NOTE — ASSESSMENT & PLAN NOTE
Chronic problem.  Stable.  Monitor H/H.  Continue iron supplementation.  Transfuse for hemodynamic instability and/or H/H <7/21  Lab Results   Component Value Date    HGB 9.6 (L) 11/05/2018

## 2018-11-06 NOTE — HPI
Micheal Hunt is a 77 yo male with PMHx of CKD stage 5, CAD, HTN, BPH, and HLD.  He was admitted to the service of hospital medicine with HCAP.  He presented to the ED with a one day onset of fever and cough.  Symptoms were associated with fatigue, nausea, wheezing and hemoptysis. He stated that he was recently admitted for a stomach virus about three weeks ago.  He denied chest pain, sob, abdominal pain, vomiting, diarrhea, dysuria and leg swelling.

## 2018-11-06 NOTE — SUBJECTIVE & OBJECTIVE
Past Medical History:   Diagnosis Date    NICK (acute kidney injury) 7/29/2016    Allergy     Anemia, mild 12/15/2014    Arthritis     Gout    Benign essential HTN 3/27/2012    BMI 29.0-29.9,adult 5/10/2018    BPH (benign prostatic hyperplasia)     BPH (benign prostatic hyperplasia)     CAD (coronary artery disease) 2006    Chronic kidney disease     due to ibuprofen    Colon polyp     CRF (chronic renal failure), stage 5     Diverticulosis     Gastritis     GERD (gastroesophageal reflux disease)     Gout     History of colon polyps 5/3/2018    HTN (hypertension) 3/27/2012    Hyperlipidemia     Hyperlipidemia     LLL pneumonia 6/14/2018    LVH (left ventricular hypertrophy)     Mesenteric ischemia     Murmur, cardiac 3/27/2012    GRICELDA (obstructive sleep apnea)     DOES NOT USE A MACHINE    Sinus problem     Syncope and collapse        Past Surgical History:   Procedure Laterality Date    APPENDECTOMY      BLOCK-NERVE Left 5/31/2016    [facility] OR    CARDIAC CATHETERIZATION      COLONOSCOPY  2011    COLONOSCOPY N/A 5/3/2018    Procedure: COLONOSCOPY;  Surgeon: Messi Harris MD;  Location: [facility];  Service: Endoscopy;  Laterality: N/A;    COLONOSCOPY N/A 5/3/2018    [facility] at [facility]    COLONOSCOPY N/A 9/10/2015    [facility]    CORONARY ARTERY BYPASS GRAFT  4/2007    x 1    CYSTOSCOPY N/A 8/30/2017    [facility] OR    CYSTOSCOPY N/A 11/10/2015    [facility]    ESOPHAGOGASTRODUODENOSCOPY (EGD) N/A 1/4/2016    Performed [facility] Tra [facility] at [facility]O (2ND FLR)    ESOPHAGOGASTRODUODENOSCOPY (EGD) N/A 10/15/2014    Per[facility]O    INJECTION-STEROID-EPIDURAL-TRANSFORAMINAL Left 2/25/2016    [facility] OR    JOINT REPLACEMENT      left knee total replacement  X  3    mid leftt finger      from a cactuss    MOLE REMOVAL  2016    RADIOFREQUENCY THERMOCOAGULATION (RFTC)-NERVE-MEDIAN BRANCH-LUMBAR Left 4/11/2016    [facility]    rotative cuff      no rotative cuffs on bilat shoulders has pins     SHOULDER SURGERY      shoulder surgery bilat  RIGHT X 4; LEFT X 3    TRANSRECTAL ULTRASOUND GUIDED PROSTATE BIOPSY Bilateral 11/10/2015    [facility]    ULTRASOUND-ENDOSCOPIC-UPPER N/A 5/31/2017    Per[facility] at [facility] ENDO (2ND FLR)    ULTRASOUND-ENDOSCOPIC-UPPER N/A 1/4/2016    Performed [facility] Tra [facility] at [facility] ENDO (2ND FLR)    ULTRASOUND-ENDOSCOPIC-UPPER N/A 1/16/2015    Performed by Jason Saleem MD at [facility] ENDO (2ND FLR)       Review of patient's allergies indicates:   Allergen Reactions    Ace inhibitors Other (See Comments)     Cough    Arb-angiotensin receptor antagonist Itching    Eplerenone Other (See Comments)     Marked bradycardia, 40, tiredness and weakness      Sulfa (sulfonamide antibiotics) Itching     Patient says this was 10 years ago and doesn't remember what happened       No current facility-administered medications on file prior to encounter.      Current Outpatient Medications on File Prior to Encounter   Medication Sig    albuterol 90 mcg/actuation inhaler 2 puffs every 4 hours as needed for cough, wheeze, or shortness of breath    albuterol-ipratropium (DUO-NEB) 2.5 mg-0.5 mg/3 mL nebulizer solution INHALE 1 VIAL BY NEBULIZER EVERY 6 HOURS AS NEEDED FOR WHEEZING    allopurinol (ZYLOPRIM) 100 MG tablet Take 1 tablet (100 mg total) by mouth once daily.    amLODIPine (NORVASC) 10 MG tablet Take 1 tablet (10 mg total) by mouth every evening.    aspirin (ECOTRIN) 81 MG EC tablet Take 81 mg by mouth once daily.      atorvastatin (LIPITOR) 80 MG tablet TAKE 1 TABLET (80 MG TOTAL) BY MOUTH ONCE DAILY.    carvedilol (COREG) 6.25 MG tablet Take 1 tablet (6.25 mg  total) by mouth 2 (two) times daily with meals.    finasteride (PROSCAR) 5 mg tablet TAKE 1 TABLET ONCE DAILY.    fluticasone (FLONASE) 50 mcg/actuation nasal spray 2 sprays (100 mcg total) by Each Nare route once daily. (Patient taking differently: 2 sprays by Each Nare route daily as needed. )    gabapentin (NEURONTIN) 300 MG capsule Take 1 capsule (300 mg total) by mouth every evening.    isosorbide mononitrate (ISMO,MONOKET) 10 mg tablet TAKE 1 TABLET BY MOUTH EVERY DAY IN THE EVENING    meclizine (ANTIVERT) 25 mg tablet TAKE 1 TABLET (25 MG TOTAL) BY MOUTH 3 (THREE) TIMES DAILY AS NEEDED.    sodium chloride (SALINE NASAL MIST) 3 % Mist 1 spray by Nasal route 2 (two) times daily.    tamsulosin (FLOMAX) 0.4 mg Cap Take 1 capsule (0.4 mg total) by mouth once daily.    traMADol (ULTRAM) 50 mg tablet Take 1 tablet (50 mg total) by mouth every 12 (twelve) hours as needed for Pain.    ciprofloxacin HCl (CIPRO) 500 MG tablet Take 1 tablet (500 mg total) by mouth 2 (two) times daily. for 7 days    COLCRYS 0.6 mg tablet TAKE 1 TABLET (0.6 MG TOTAL) BY MOUTH ONCE DAILY.    cyclobenzaprine (FLEXERIL) 5 MG tablet One tab po with breakfast and lunch take 2 tabs po before bedtime for up to 7 days    iron polysaccharides (NIFEREX) 150 mg iron Cap Take 1 capsule (150 mg total) by mouth 2 (two) times daily before meals.    losartan (COZAAR) 100 MG tablet Take 1 tablet (100 mg total) by mouth once daily.    mirtazapine (REMERON) 7.5 MG Tab Take 1 tablet (7.5 mg total) by mouth every evening.    montelukast (SINGULAIR) 10 mg tablet Take 1 tablet (10 mg total) by mouth every evening.    NITROSTAT 0.4 mg SL tablet PLACE 1 TABLET (0.4 MG TOTAL) UNDER THE TONGUE EVERY 5 (FIVE) MINUTES AS NEEDED FOR CHEST PAIN.    omeprazole (PRILOSEC) 20 MG capsule Take 1 capsule (20 mg total) by mouth once daily.    polyethylene glycol (GLYCOLAX) 17 gram/dose powder Take 17 g by mouth 2 (two) times daily before meals.     Family  History     Problem Relation (Age of Onset)    Cancer Father    Heart disease Mother, Sister    Hypertension Brother    Pneumonia Sister    Stroke Sister    Sudden death Father        Tobacco Use    Smoking status: Never Smoker    Smokeless tobacco: Never Used   Substance and Sexual Activity    Alcohol use: No    Drug use: No    Sexual activity: Not on file     Review of Systems   Constitutional: Positive for fatigue and fever. Negative for activity change, appetite change and chills.   HENT: Negative for congestion, hearing loss, sore throat and trouble swallowing.    Eyes: Negative for photophobia and visual disturbance.   Respiratory: Positive for cough (with hemoptysis) and wheezing. Negative for shortness of breath.    Cardiovascular: Negative for chest pain, palpitations and leg swelling.   Gastrointestinal: Positive for nausea. Negative for abdominal pain, diarrhea and vomiting.   Endocrine: Negative for polydipsia, polyphagia and polyuria.   Genitourinary: Negative for difficulty urinating, dysuria, frequency and urgency.   Musculoskeletal: Negative for back pain, neck pain and neck stiffness.   Skin: Negative for rash and wound.   Neurological: Negative for dizziness, syncope, weakness, numbness and headaches.   Psychiatric/Behavioral: Negative for confusion. The patient is not nervous/anxious.      Objective:     Vital Signs (Most Recent):  Temp: 99.9 °F (37.7 °C) (11/06/18 0209)  Pulse: 73 (11/06/18 0306)  Resp: 18 (11/06/18 0306)  BP: (!) 147/71 (11/06/18 0209)  SpO2: 95 % (11/06/18 0306) Vital Signs (24h Range):  Temp:  [97.7 °F (36.5 °C)-100 °F (37.8 °C)] 99.9 °F (37.7 °C)  Pulse:  [73-78] 73  Resp:  [16-18] 18  SpO2:  [95 %-98 %] 95 %  BP: (122-152)/(64-74) 147/71     Weight: 90.7 kg (199 lb 15.3 oz)  Body mass index is 26.38 kg/m².    Physical Exam   Constitutional: He is oriented to person, place, and time. He appears well-developed and well-nourished. No distress.   HENT:   Head: Normocephalic  and atraumatic.   Some facial bruising noted around eyes and nose.   Eyes: Conjunctivae and EOM are normal. Pupils are equal, round, and reactive to light. No scleral icterus.   Neck: Normal range of motion. Neck supple. No JVD present. No tracheal deviation present. No thyromegaly present.   Cardiovascular: Normal rate, regular rhythm and intact distal pulses. Exam reveals no gallop and no friction rub.   Murmur heard.  Pulses:       Dorsalis pedis pulses are 2+ on the right side, and 2+ on the left side.   Pulmonary/Chest: Effort normal. No accessory muscle usage. No respiratory distress. He has no wheezes. He has no rhonchi. He has rales (Left lower lobe).   Abdominal: Soft. Bowel sounds are normal. He exhibits no distension and no mass. There is no tenderness. There is no rebound and no guarding.   Musculoskeletal: Normal range of motion. He exhibits no edema, tenderness or deformity.   Neurological: He is alert and oriented to person, place, and time. He has normal strength. He exhibits normal muscle tone. Coordination normal.   Skin: Skin is warm, dry and intact. Capillary refill takes less than 2 seconds. No rash noted. He is not diaphoretic. No erythema.   Psychiatric: He has a normal mood and affect. His speech is normal and behavior is normal. Judgment and thought content normal.   Vitals reviewed.        CRANIAL NERVES     CN III, IV, VI   Pupils are equal, round, and reactive to light.  Extraocular motions are normal.        Significant Labs:   CBC:   Recent Labs   Lab 11/05/18  2216   WBC 10.30   HGB 9.6*   HCT 28.8*   *     CMP:   Recent Labs   Lab 11/05/18  2216   *   K 4.2      CO2 14*   GLU 93   BUN 81*   CREATININE 5.5*   CALCIUM 7.9*   PROT 6.5   ALBUMIN 3.5   BILITOT 0.3   ALKPHOS 78   AST 23   ALT 35   ANIONGAP 12   EGFRNONAA 9*     Cardiac Markers: No results for input(s): CKMB, MYOGLOBIN, BNP, TROPISTAT in the last 48 hours.  Coagulation: No results for input(s): PT, INR,  APTT in the last 48 hours.  Lactic Acid: No results for input(s): LACTATE in the last 48 hours.  Lipase: No results for input(s): LIPASE in the last 48 hours.  Lipid Panel: No results for input(s): CHOL, HDL, LDLCALC, TRIG, CHOLHDL in the last 48 hours.  Magnesium: No results for input(s): MG in the last 48 hours.  Troponin: No results for input(s): TROPONINI in the last 48 hours.  TSH:   Recent Labs   Lab 08/29/18  1040   TSH 1.885     Urine Studies:   Recent Labs   Lab 11/05/18  2235   COLORU Yellow   APPEARANCEUA Clear   PHUR 6.0   SPECGRAV 1.015   PROTEINUA 2+*   GLUCUA Negative   KETONESU Negative   BILIRUBINUA Negative   OCCULTUA 1+*   NITRITE Negative   UROBILINOGEN Negative   LEUKOCYTESUR Negative   RBCUA 1   WBCUA 0   BACTERIA None   HYALINECASTS 0       Significant Imaging: I have reviewed all pertinent imaging results/findings within the past 24 hours.     CT chest: IMPRESSION:  1. Left lower lobe consolidation suggestive of an infectious process.  2. 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

## 2018-11-06 NOTE — PT/OT/SLP EVAL
Physical Therapy Evaluation and Discharge Note    Patient Name:  Micheal Hunt   MRN:  [id number]    Recommendations:     Discharge Recommendations:  home   Discharge Equipment Recommendations: none   Barriers to discharge: None    Assessment:     Micheal Hunt is a 78 y.o. male admitted with a medical diagnosis of HCAP (healthcare-associated pneumonia). .  At this time, patient is functioning at their prior level of function and does not require further acute PT services.     Recent Surgery: * No surgery found *      Plan:     During this hospitalization, patient does not require further acute PT services.  Please re-consult if situation changes.      Subjective     Chief Complaint: neck pains/sore throat  Pt stated had a tripped and fall hx resulting to nasal fx  Pt stated had 3 L TKA  and still bothering him and not interested in any more revisions  Patient/Family Comments/goals: get well  Pain/Comfort:  · Pain Rating 1: 8/10  · Location 1: neck  · Pain Addressed 1: Pre-medicate for activity, Reposition    Patients cultural, spiritual, Adventism conflicts given the current situation:      Living Environment:  Home alone  Prior to admission, patients level of function was independent/drives.  Equipment used at home: none.  DME owned (not currently used): none.  Upon discharge, patient will have assistance from none.    Objective:     Communicated with nurse Yanet prior to session.  Patient found supine upon PT entry to room found with: peripheral IV     General Precautions: Standard,     Orthopedic Precautions:N/A   Braces: N/A     Exams:  · RLE ROM: WFL  · RLE Strength: WFL  · LLE ROM: WFL  · LLE Strength: WFL    Functional Mobility:  · Bed Mobility:     · Supine to Sit: independence  · Sit to Supine: independence  · Transfers:     · Sit to Stand:  independence with no AD  · Gait: 250ft no AD, no LOB    AM-PAC 6 CLICK MOBILITY  Total Score:21       Therapeutic Activities and Exercises:    bathroom use independently to void  To sink to wash hands  Gait to hallways with no gait deficits  Pt encouraged frequent ambulation and exercises    AM-PAC 6 CLICK MOBILITY  Total Score:21     Patient left HOB elevated with all lines intact and nurse Yanet present.    GOALS:   Multidisciplinary Problems     Physical Therapy Goals     Not on file                History:     Past Medical History:   Diagnosis Date    NICK (acute kidney injury) 7/29/2016    Allergy     Anemia, mild 12/15/2014    Arthritis     Gout    Benign essential HTN 3/27/2012    BMI 29.0-29.9,adult 5/10/2018    BPH (benign prostatic hyperplasia)     BPH (benign prostatic hyperplasia)     CAD (coronary artery disease) 2006    Chronic kidney disease     due to ibuprofen    Colon polyp     CRF (chronic renal failure), stage 5     Diverticulosis     Gastritis     GERD (gastroesophageal reflux disease)     Gout     History of colon polyps 5/3/2018    HTN (hypertension) 3/27/2012    Hyperlipidemia     Hyperlipidemia     LLL pneumonia 6/14/2018    LVH (left ventricular hypertrophy)     Mesenteric ischemia     Murmur, cardiac 3/27/2012    GRICELDA (obstructive sleep apnea)     DOES NOT USE A MACHINE    Sinus problem     Syncope and collapse        Past Surgical History:   Procedure Laterality Date    APPENDECTOMY      BLOCK-NERVE Left 5/31/2016    [facility] OR    CARDIAC CATHETERIZATION      COLONOSCOPY  2011    COLONOSCOPY N/A 5/3/2018    Procedure: COLONOSCOPY;  Surgeon: Messi Harris MD;  Location: [facility];  Service: Endoscopy;  Laterality: N/A;    COLONOSCOPY N/A 5/3/2018    [facility]    COLONOSCOPY N/A 9/10/2015    [facility]    CORONARY ARTERY BYPASS GRAFT  4/2007    x 1    CYSTOSCOPY N/A 8/30/2017    [facility] at [facility]    CYSTOSCOPY N/A 11/10/2015    Performed by Rud[facility]  at [facility] OR    ESOPHAGOGASTRODUODENOSCOPY (EGD) N/A 1/4/2016    Performed [facility] Tra [facility] at [facility] ENDO (2ND FLR)    ESOPHAGOGASTRODUODENOSCOPY (EGD) N/A 10/15/2014    Performed by Messi Harris MD at [facility] ENDO    INJECTION-STEROID-EPIDURAL-TRANSFORAMINAL Left 2/25/2016    [facility] OR    JOINT REPLACEMENT      left knee total replacement  X 3    mid leftt finger      from a cactuss    MOLE REMOVAL  2016    RADIOFREQUENCY THERMOCOAGULATION (RFTC)-NERVE-MEDIAN BRANCH-LUMBAR Left 4/11/2016    [facility] OR    rotative cuff      no rotative cuffs on bilat shoulders has pins     SHOULDER SURGERY      shoulder surgery bilat  RIGHT X 4; LEFT X 3    TRANSRECTAL ULTRASOUND GUIDED PROSTATE BIOPSY Bilateral 11/10/2015    Per[facility] by [facility] at [facility] OR    ULTRASOUND-ENDOSCOPIC-UPPER N/A 5/31/2017    Performed by Tra [facility] at [facility] ENDO (2ND FLR)    ULTRASOUND-ENDOSCOPIC-UPPER N/A 1/4/2016    Performed by Tra [facility] at [facility] ENDO (2ND FLR)    ULTRASOUND-ENDOSCOPIC-UPPER N/A 1/16/2015    Performed by Jason Saleem MD at [facility] ENDO (2ND FLR)       Clinical Decision Making:     History  Co-morbidities and personal factors that may impact the plan of care Examination  Body Structures and Functions, activity limitations and participation restrictions that may impact the plan of care Clinical Presentation   Decision Making/ Complexity Score   Co-morbidities:   [] Time since onset of injury / illness / exacerbation  [] Status of current condition  []Patient's cognitive status and safety concerns    [] Multiple Medical Problems (see med hx)  Personal Factors:   [] Patient's age  [] Prior Level of function   [] Patient's home situation (environment and family support)  [] Patient's level of motivation  [] Expected progression of patient      HISTORY:(criteria)    [] 62344 - no personal factors/history    [] 37397 - has 1-2 personal  factor/comorbidity     [] 60508 - has >3 personal factor/comorbidity     Body Regions:  [] Objective examination findings  [] Head     []  Neck  [] Trunk   [] Upper Extremity  [] Lower Extremity    Body Systems:  [] For communication ability, affect, cognition, language, and learning style: the assessment of the ability to make needs known, consciousness, orientation (person, place, and time), expected emotional /behavioral responses, and learning preferences (eg, learning barriers, education  needs)  [] For the neuromuscular system: a general assessment of gross coordinated movement (eg, balance, gait, locomotion, transfers, and transitions) and motor function  (motor control and motor learning)  [] For the musculoskeletal system: the assessment of gross symmetry, gross range of motion, gross strength, height, and weight  [] For the integumentary system: the assessment of pliability(texture), presence of scar formation, skin color, and skin integrity  [] For cardiovascular/pulmonary system: the assessment of heart rate, respiratory rate, blood pressure, and edema     Activity limitations:    [] Patient's cognitive status and saf ety concerns          [] Status of current condition      [] Weight bearing restriction  [] Cardiopulmunary Restriction    Participation Restrictions:   [] Goals and goal agreement with the patient     [] Rehab potential (prognosis) and probable outcome      Examination of Body System: (criteria)    [] 49874 - addressing 1-2 elements    [] 59157 - addressing a total of 3 or more elements     [] 63008 -  Addressing a total of 4 or more elements         Clinical Presentation: (criteria)  Choose one     On examination of body system using standardized tests and measures patient presents with (CHOOSE ONE) elements from any of the following: body structures and functions, activity limitations, and/or participation restrictions.  Leading to a clinical presentation that is considered (CHOOSE  ONE)                              Clinical Decision Making  (Eval Complexity):  Choose One     Time Tracking:     PT Received On: 11/06/18  PT Start Time: 0927     PT Stop Time: 0938  PT Total Time (min): 11 min     Billable Minutes: Evaluation 11      Malaika Cerrato, PT  11/06/2018

## 2018-11-06 NOTE — PT/OT/SLP EVAL
Occupational Therapy   Evaluation and Discharge Note    Name: Micheal Hunt  MRN: [id number]  Admitting Diagnosis:  HCAP (healthcare-associated pneumonia)      Recommendations:     Discharge Recommendations: home  Discharge Equipment Recommendations:  none  Barriers to discharge:  None    History:     Occupational Profile:  Living Environment: Pt lives on a 20 acre lot. Although pt lives alone in a SSH (with ramp), pt's dtr lives on the 20 acre lot as well.   Previous level of function: Pt was independent with ADLs and mobility.  Equipment Owned:  none  Assistance upon Discharge: Pt will receive intermittent assistance from his dtr.    Past Medical History:   Diagnosis Date    NICK (acute kidney injury) 7/29/2016    Allergy     Anemia, mild 12/15/2014    Arthritis     Gout    Benign essential HTN 3/27/2012    BMI 29.0-29.9,adult 5/10/2018    BPH (benign prostatic hyperplasia)     BPH (benign prostatic hyperplasia)     CAD (coronary artery disease) 2006    Chronic kidney disease     due to ibuprofen    Colon polyp     CRF (chronic renal failure), stage 5     Diverticulosis     Gastritis     GERD (gastroesophageal reflux disease)     Gout     History of colon polyps 5/3/2018    HTN (hypertension) 3/27/2012    Hyperlipidemia     Hyperlipidemia     LLL pneumonia 6/14/2018    LVH (left ventricular hypertrophy)     Mesenteric ischemia     Murmur, cardiac 3/27/2012    GRICELDA (obstructive sleep apnea)     DOES NOT USE A MACHINE    Sinus problem     Syncope and collapse        Past Surgical History:   Procedure Laterality Date    APPENDECTOMY      BLOCK-NERVE Left 5/31/2016    [facility]    CARDIAC CATHETERIZATION      COLONOSCOPY  2011    COLONOSCOPY N/A 5/3/2018    Procedure: COLONOSCOPY;  Surgeon: Messi Harris MD;  Location: [facility];  Service: Endoscopy;  Laterality: N/A;    COLONOSCOPY N/A 5/3/2018    [facility]     "COLONOSCOPY N/A 9/10/2015    [facility]O    CORONARY ARTERY BYPASS GRAFT  4/2007    x 1    CYSTOSCOPY N/A 8/30/2017    [facility]    CYSTOSCOPY N/A 11/10/2015    [facility] OR    ESOPHAGOGASTRODUODENOSCOPY (EGD) N/A 1/4/2016    Performed by Tra [facility] at [facility] ENDO (2ND FLR)    ESOPHAGOGASTRODUODENOSCOPY (EGD) N/A 10/15/2014    [facility] at [facility] ENDO    INJECTION-STEROID-EPIDURAL-TRANSFORAMINAL Left 2/25/2016    [facility]    JOINT REPLACEMENT      left knee total replacement  X 3    mid leftt finger      from a cactuss    MOLE REMOVAL  2016    RADIOFREQUENCY THERMOCOAGULATION (RFTC)-NERVE-MEDIAN BRANCH-LUMBAR Left 4/11/2016    [facility]    rotative cuff      no rotative cuffs on bilat shoulders has pins     SHOULDER SURGERY      shoulder surgery bilat  RIGHT X 4; LEFT X 3    TRANSRECTAL ULTRASOUND GUIDED PROSTATE BIOPSY Bilateral 11/10/2015    [facility] OR    ULTRASOUND-ENDOSCOPIC-UPPER N/A 5/31/2017    [facility]formed [facility] at [facility] ENDO (2ND FLR)    ULTRASOUND-ENDOSCOPIC-UPPER N/A 1/4/2016    Performed [facility] at [facility] ENDO (2ND FLR)    ULTRASOUND-ENDOSCOPIC-UPPER N/A 1/16/2015    Performed by Jason Saleem MD at [facility] ENDO (2ND FLR)       Subjective     Chief Complaint: neck pain  Patient/Family stated goals: "My neck is killing me."  Communicated with: nurse Yanet prior to session.  Pain/Comfort:  · Pain Rating 1: 8/10  · Location - Side 1: Bilateral  · Location 1: neck  · Pain Addressed 1: Pre-medicate for activity, Reposition, Nurse notified  · Pain Rating Post-Intervention 1: 8/10    Patients cultural, spiritual, Sabianism conflicts given the current situation:      Objective:     Patient found with: peripheral IV    General Precautions: Standard, " fall   Orthopedic Precautions:N/A   Braces: N/A     Occupational Performance:    Bed Mobility:    · Not assesed; pt seated in chair upon arrival.     Functional Mobility/Transfers:  · Patient completed Sit <> Stand Transfer with modified independence  with  no assistive device   · Patient completed Toilet Transfer Stand Pivot technique with modified independence with  no AD  · Functional Mobility: Pt ambulated with mod I and no AD from EOB>bathroom>EOB. No LOB.    Activities of Daily Living:  · Grooming: independence with hair grooming at EOB.  · Lower Body Dressing: modified independence to don/doff socks at EOB.    Cognitive/Visual Perceptual:  Cognitive/Psychosocial Skills:     -       Oriented to: x4   -       Follows Commands/attention:Follows multistep  commands  -       Communication: clear/fluent  -       Safety awareness/insight to disability: intact   -       Mood/Affect/Coping skills/emotional control: Appropriate to situation  Visual/Perceptual:      -Intact     Physical Exam:  Postural examination/scapula alignment:    -       Rounded shoulders  -       Forward head  Upper Extremity Range of Motion:     -       Right Upper Extremity:  90* at shoulder 2/2 past shldr injury; WFL at elbow/wrist  -       Left Upper Extremity: 90* at shoulder 2/2 past shldr injury; WFL at elbow/wrist  Upper Extremity Strength:    -       Right Upper Extremity: WFL except at shldr (3-/5)  -       Left Upper Extremity: WFL except at shldr (3-/5)   Strength:    -       Right Upper Extremity: WFL  -       Left Upper Extremity: WFL   Fine Motor Coordination:    -       Intact  Gross motor coordination:   WFL    Patient left HOB elevated with call button in reach and nurse notified    AMPA 6 Click:  AMPA Total Score: 23      Education:    Assessment:     Micheal Hunt is a 78 y.o. male with a medical diagnosis of HCAP (healthcare-associated pneumonia). At this time, patient is modified independent with ADLs and does  "not require further acute OT services.     Clinical Decision Makin.  OT Low:  "Pt evaluation falls under low complexity for evaluation coding due to performance deficits noted in 1-3 areas as stated above and 0 co-morbities affecting current functional status. Data obtained from problem focused assessments. No modifications or assistance was required for completion of evaluation. Only brief occupational profile and history review completed."     Plan:     During this hospitalization, patient does not require further acute OT services.  Please re-consult if situation changes.    · Plan of Care Reviewed with: patient    This Plan of care has been discussed with the patient who was involved in its development and understands and is in agreement with the identified goals and treatment plan    GOALS:   Multidisciplinary Problems     Occupational Therapy Goals     Not on file          Multidisciplinary Problems (Resolved)        Problem: Occupational Therapy Goal    Goal Priority Disciplines Outcome Interventions   Occupational Therapy Goal   (Resolved)     OT, PT/OT Outcome(s) achieved                    Time Tracking:     OT Date of Treatment: 18  OT Start Time: 902  OT Stop Time: 914  OT Total Time (min): 12 min    Billable Minutes:Evaluation 12    Kostas Alvarez OT  2018    "

## 2018-11-06 NOTE — PLAN OF CARE
PCP is Dr Lakhani.  Pharmacy is [facility] in [place].  Patient lives alone in a home on 20 acres, and his daughter lives in a home on the same property.  Denies HH.  Discharge plan is home; no needs.       11/06/18 1304   Discharge Assessment   Assessment Type Discharge Planning Assessment   Confirmed/corrected address and phone number on facesheet? Yes   Assessment information obtained from? Patient   Prior to hospitilization cognitive status: Alert/Oriented   Prior to hospitalization functional status: Independent   Current cognitive status: Alert/Oriented   Current Functional Status: Independent   Lives With alone   Able to Return to Prior Arrangements yes   Is patient able to care for self after discharge? Yes   Patient's perception of discharge disposition home or selfcare   Readmission Within The Last 30 Days current reason for admission unrelated to previous admission   If yes, most recent facility name: [facility]   Patient currently being followed by outpatient case management? No   Patient currently receives any other outside agency services? No   Equipment Currently Used at Home nebulizer   Do you have any problems affording any of your prescribed medications? No   Is the patient taking medications as prescribed? yes   Does the patient have transportation home? Yes   Transportation Available family or friend will provide;car   Dialysis Name and Scheduled days none   Does the patient receive services at the [facility]? No   Discharge Plan A Home   Patient/Family In Agreement With Plan yes   Readmission Questionnaire   At the time of your discharge, did someone talk to you about what your health problems were? Yes   At the time of discharge, did someone talk to you about what to watch out for regarding worsening of your health problem? Yes   At the time of discharge, did someone talk to you about what to do if you experienced worsening of your health problem? Yes   At the time of discharge, did someone  talk to you about which medication to take when you left the hospital and which ones to stop taking? Yes   At the time of discharge, did someone talk to you about when and where to follow up with a doctor after you left the hospital? Yes   What do you believe caused you to be sick enough to be re-admitted? fever   How often do you need to have someone help you when you read instructions, pamphlets, or other written material from your doctor or pharmacy? Rarely   Do you have problems taking your medications as prescribed? No   Do you have any problems affording any of  your prescribed medications? No   Do you have problems obtaining/receiving your medications? No   Does the patient have transportation to healthcare appointments? Yes   Does your caregiver provide all the help you need? Yes   Are you currently feeling confused? No   Are you currently having problems thinking? No   Are you currently having memory problems? No   Have you felt down, depressed, or hopeless? 1   Have you felt little interest or pleasure in doing things? 1   In the last 7 days, my sleep quality was: fair

## 2018-11-06 NOTE — ED PROVIDER NOTES
Encounter Date: 11/5/2018    SCRIBE #1 NOTE: I, Taqueria Gonzales, am scribing for, and in the presence of, Dr. Ng.       History     Chief Complaint   Patient presents with    Fever     Started this evening with chills and shakes. Also associated cough and coughed up blood once. Denies shortness of breath or chest pain      11/05/2018 10:45 PM    The pt is a 78 y.o. male with a past medical history of NICK, anemia, HTN, BPH, CAD, CKD, CRF, diverticulosis, HLD, LVH, and syncope presenting to the ED with a sudden onset of an acute fever beginning 5 hrs ago. Associated symptoms of chills and productive cough with blood present x1. The pt denied SOB, diarrhea, dysuria, and chest pain. The pt was hospitalized twice within 1 month. He stated he took tramadol for symptoms with no improvement of symptoms. The pt has a past surgical history of shoulder surgery, cardiac catheterization, CABG, and joint replacement.      The history is provided by the patient.     Review of patient's allergies indicates:   Allergen Reactions    Ace inhibitors Other (See Comments)     Cough    Arb-angiotensin receptor antagonist Itching    Eplerenone Other (See Comments)     Marked bradycardia, 40, tiredness and weakness      Sulfa (sulfonamide antibiotics) Itching     Patient says this was 10 years ago and doesn't remember what happened     Past Medical History:   Diagnosis Date    NICK (acute kidney injury) 7/29/2016    Allergy     Anemia, mild 12/15/2014    Arthritis     Gout    Benign essential HTN 3/27/2012    BMI 29.0-29.9,adult 5/10/2018    BPH (benign prostatic hyperplasia)     BPH (benign prostatic hyperplasia)     CAD (coronary artery disease) 2006    Chronic kidney disease     due to ibuprofen    Colon polyp     CRF (chronic renal failure), stage 5     Diverticulosis     Gastritis     GERD (gastroesophageal reflux disease)     Gout     History of colon polyps 5/3/2018    HTN (hypertension) 3/27/2012     Hyperlipidemia     Hyperlipidemia     LLL pneumonia 6/14/2018    LVH (left ventricular hypertrophy)     Mesenteric ischemia     Murmur, cardiac 3/27/2012    GRICELDA (obstructive sleep apnea)     DOES NOT USE A MACHINE    Sinus problem     Syncope and collapse      Past Surgical History:   Procedure Laterality Date    APPENDECTOMY      BLOCK-NERVE Left 5/31/2016    [facility] OR    CARDIAC CATHETERIZATION      COLONOSCOPY  2011    COLONOSCOPY N/A 5/3/2018    Procedure: COLONOSCOPY;  Surgeon: Messi Harris MD;  Location: [facility];  Service: Endoscopy;  Laterality: N/A;    COLONOSCOPY N/A 5/3/2018    [facility] at [facility]    COLONOSCOPY N/A 9/10/2015    [facility] at [facility]    CORONARY ARTERY BYPASS GRAFT  4/2007    x 1    CYSTOSCOPY N/A 8/30/2017    [facility] OR    CYSTOSCOPY N/A 11/10/2015    [facility] OR    ESOPHAGOGASTRODUODENOSCOPY (EGD) N/A 1/4/2016    [facility] at [facility] (2ND FLR)    ESOPHAGOGASTRODUODENOSCOPY (EGD) N/A 10/15/2014    [facility] by [facility] at [facility]    INJECTION-STEROID-EPIDURAL-TRANSFORAMINAL Left 2/25/2016    [facility] OR    JOINT REPLACEMENT      left knee total replacement  X 3    mid leftt finger      from a cactuss    MOLE REMOVAL  2016    RADIOFREQUENCY THERMOCOAGULATION (RFTC)-NERVE-MEDIAN BRANCH-LUMBAR Left 4/11/2016    [facility] OR    rotative cuff      no rotative cuffs on bilat shoulders has pins     SHOULDER SURGERY      shoulder surgery bilat  RIGHT X 4; LEFT X 3    TRANSRECTAL ULTRASOUND GUIDED PROSTATE BIOPSY Bilateral 11/10/2015    [facility] OR    ULTRASOUND-ENDOSCOPIC-UPPER N/A 5/31/2017    [facility]formed [facility] Tra [facility] (2ND FLR)    ULTRASOUND-ENDOSCOPIC-UPPER N/A 1/4/2016     [facility]formed [facility] ENDO (2ND FLR)    ULTRASOUND-ENDOSCOPIC-UPPER N/A 2015    Performed by Jason Saleem [facility] at [facility] ENDO (2ND FLR)     Family History   Problem Relation Age of Onset    Heart disease Mother     Sudden death Father     Cancer Father         advanced lung ca- found after 2 story fall    Stroke Sister     Pneumonia Sister          from PNA    Heart disease Sister     Hypertension Brother     Kidney cancer Neg Hx     Prostate cancer Neg Hx     Urolithiasis Neg Hx     Allergic rhinitis Neg Hx     Allergies Neg Hx     Angioedema Neg Hx     Asthma Neg Hx     Atopy Neg Hx     Eczema Neg Hx     Immunodeficiency Neg Hx     Rhinitis Neg Hx     Urticaria Neg Hx      Social History     Tobacco Use    Smoking status: Never Smoker    Smokeless tobacco: Never Used   Substance Use Topics    Alcohol use: No    Drug use: No     Review of Systems   Constitutional: Positive for chills and fever.   HENT: Negative for congestion.    Eyes: Negative for visual disturbance.   Respiratory: Positive for cough (productive cough with blood). Negative for wheezing.    Cardiovascular: Negative for chest pain.   Gastrointestinal: Negative for abdominal pain and diarrhea.   Genitourinary: Negative for dysuria.   Musculoskeletal: Negative for joint swelling.   Skin: Negative for rash.   Neurological: Negative for syncope.   Hematological: Does not bruise/bleed easily.   Psychiatric/Behavioral: Negative for confusion.       Physical Exam     Initial Vitals [18 2143]   BP Pulse Resp Temp SpO2   136/65 75 16 100 °F (37.8 °C) 98 %      MAP       --         Physical Exam    Constitutional: He appears well-developed and well-nourished.  Non-toxic appearance. No distress.   HENT:   Old bruising under both eyes   Eyes: EOM are normal. Pupils are equal, round, and reactive to light.   Neck: Normal range of motion. Neck supple. No neck rigidity. No JVD present.   Cardiovascular:  Normal rate, regular rhythm, normal heart sounds and intact distal pulses. Exam reveals no gallop and no friction rub.    No murmur heard.  Pulmonary/Chest: He has wheezes. He has rhonchi (left). He has no rales.   Coarse breath sounds   Abdominal: Soft. Bowel sounds are normal. He exhibits no distension. There is no tenderness. There is no rigidity, no rebound and no guarding.   Musculoskeletal:   Pitting edema bilaterally in lower extremities   Neurological: He is alert and oriented to person, place, and time. He has normal strength and normal reflexes. No cranial nerve deficit or sensory deficit. He exhibits normal muscle tone. Coordination normal. GCS eye subscore is 4. GCS verbal subscore is 5. GCS motor subscore is 6.   Skin: Skin is warm and dry.   Psychiatric: He has a normal mood and affect. His speech is normal and behavior is normal. He is not actively hallucinating.         ED Course   Procedures  Labs Reviewed   CBC W/ AUTO DIFFERENTIAL - Abnormal; Notable for the following components:       Result Value    RBC 3.17 (*)     Hemoglobin 9.6 (*)     Hematocrit 28.8 (*)     RDW 16.5 (*)     Platelets 143 (*)     MPV 8.4 (*)     Gran # (ANC) 8.5 (*)     Gran% 82.5 (*)     Lymph% 10.3 (*)     All other components within normal limits   COMPREHENSIVE METABOLIC PANEL - Abnormal; Notable for the following components:    Sodium 135 (*)     CO2 14 (*)     BUN, Bld 81 (*)     Creatinine 5.5 (*)     Calcium 7.9 (*)     eGFR if  10 (*)     eGFR if non  9 (*)     All other components within normal limits   URINALYSIS, REFLEX TO URINE CULTURE - Abnormal; Notable for the following components:    Protein, UA 2+ (*)     Occult Blood UA 1+ (*)     All other components within normal limits    Narrative:     Preferred Collection Type->Urine, Clean Catch   URINALYSIS MICROSCOPIC    Narrative:     Preferred Collection Type->Urine, Clean Catch          Imaging Results          CT Chest Without  Contrast (In process)                X-Ray Chest PA And Lateral (In process)                  Medical Decision Making:   History:   Old Medical Records: I decided to obtain old medical records.  Initial Assessment:   78-year-old man presents for evaluation of cough, hemoptysis and subjective fever/chills.  X-ray obtained but I did not appreciate any convincing evidence of pneumonia.  CT of the thorax was performed and shows left lower lobe infiltrate suspicious for infectious etiology.  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  I have low suspicion for ruptured aneurysm or leaking aneurysm.  Discussed with Dr. Guillen who agrees on admission for antibiotic therapy.  Patient has been hospitalized in the past 90 days for greater than 2 days meeting criteria for Hcap.  Discussed with the hospitalist who agrees to admit the patient.  Clinical Tests:   Lab Tests: Ordered and Reviewed  Radiological Study: Reviewed and Ordered            Scribe Attestation:   Scribe #1: I performed the above scribed service and the documentation accurately describes the services I performed. I attest to the accuracy of the note.    I, Hernandez Treviño, personally performed the services described in this documentation. All medical record entries made by the scribe were at my direction and in my presence.  I have reviewed the chart and agree that the record reflects my personal performance and is accurate and complete. Rai Ng MD.  6:10 AM 11/06/2018             Clinical Impression:   The encounter diagnosis was HCAP (healthcare-associated pneumonia).      Disposition:   Disposition: Admitted                        Rai Ng MD  11/06/18 0610

## 2018-11-06 NOTE — ASSESSMENT & PLAN NOTE
Chronic problem. Continue Statin therapy.  Lab Results   Component Value Date    LDLCALC 46.4 (L) 07/26/2018

## 2018-11-06 NOTE — PLAN OF CARE
Problem: Patient Care Overview  Goal: Plan of Care Review  PT eval completed. Pt ambulatory 250ft with no loss of balance- no acute PT needs

## 2018-11-06 NOTE — H&P
[facility] Hospital Medicine  History & Physical    Patient Name: Micheal Hunt  MRN: [id number]  Admission Date: 11/5/2018  Attending Physician: Nandini Herndon MD   Primary Care Provider: Pk Lakhani MD         Patient information was obtained from patient, past medical records and ER records.     Subjective:     Principal Problem:HCAP (healthcare-associated pneumonia)    Chief Complaint:   Chief Complaint   Patient presents with    Fever     Started this evening with chills and shakes. Also associated cough and coughed up blood once. Denies shortness of breath or chest pain         HPI: Micheal Hunt is a 77 yo male with PMHx of CKD stage 5, CAD, HTN, BPH, and HLD.  He was admitted to the service of hospital medicine with HCAP.  He presented to the ED with a one day onset of fever and cough.  Symptoms were associated with fatigue, nausea, wheezing and hemoptysis. He stated that he was recently admitted for a stomach virus about three weeks ago.  He denied chest pain, sob, abdominal pain, vomiting, diarrhea, dysuria and leg swelling.    Past Medical History:   Diagnosis Date    NICK (acute kidney injury) 7/29/2016    Allergy     Anemia, mild 12/15/2014    Arthritis     Gout    Benign essential HTN 3/27/2012    BMI 29.0-29.9,adult 5/10/2018    BPH (benign prostatic hyperplasia)     BPH (benign prostatic hyperplasia)     CAD (coronary artery disease) 2006    Chronic kidney disease     due to ibuprofen    Colon polyp     CRF (chronic renal failure), stage 5     Diverticulosis     Gastritis     GERD (gastroesophageal reflux disease)     Gout     History of colon polyps 5/3/2018    HTN (hypertension) 3/27/2012    Hyperlipidemia     Hyperlipidemia     LLL pneumonia 6/14/2018    LVH (left ventricular hypertrophy)     Mesenteric ischemia     Murmur, cardiac 3/27/2012    GRICELDA (obstructive sleep apnea)     DOES NOT USE A MACHINE    Sinus problem     Syncope and  collapse        Past Surgical History:   Procedure Laterality Date    APPENDECTOMY      BLOCK-NERVE Left 5/31/2016    Performed by [facility] at [facility] OR    CARDIAC CATHETERIZATION      COLONOSCOPY  2011    COLONOSCOPY N/A 5/3/2018    Procedure: COLONOSCOPY;  Surgeon: Messi Harris MD;  Location: [facility];  Service: Endoscopy;  Laterality: N/A;    COLONOSCOPY N/A 5/3/2018    [facility]formed by [facility] at [facility]    COLONOSCOPY N/A 9/10/2015    [facility] at [facility]    CORONARY ARTERY BYPASS GRAFT  4/2007    x 1    CYSTOSCOPY N/A 8/30/2017    [facility]formed by [facility]y [facility]ring [facility] at [facility] OR    CYSTOSCOPY N/A 11/10/2015    [facility]    ESOPHAGOGASTRODUODENOSCOPY (EGD) N/A 1/4/2016    Performed by Tra Brooks [facility] at [facility] (2ND FLR)    ESOPHAGOGASTRODUODENOSCOPY (EGD) N/A 10/15/2014    Performed by [facility] at [facility]    INJECTION-STEROID-EPIDURAL-TRANSFORAMINAL Left 2/25/2016    [facility] OR    JOINT REPLACEMENT      left knee total replacement  X 3    mid leftt finger      from a cactuss    MOLE REMOVAL  2016    RADIOFREQUENCY THERMOCOAGULATION (RFTC)-NERVE-MEDIAN BRANCH-LUMBAR Left 4/11/2016    [facility] at [facility] OR    rotative cuff      no rotative cuffs on bilat shoulders has pins     SHOULDER SURGERY      shoulder surgery bilat  RIGHT X 4; LEFT X 3    TRANSRECTAL ULTRASOUND GUIDED PROSTATE BIOPSY Bilateral 11/10/2015    [facility] OR    ULTRASOUND-ENDOSCOPIC-UPPER N/A 5/31/2017    [facility] (2ND F[facility])    ULTRASOUND-ENDOSCOPIC-UPPER N/A 1/4/2016    Performed by Tra [facility] at [facility] END[facility] (2ND FLR)    ULTRASOUND-ENDOSCOPIC-UPPER N/A 1/16/2015    Performed by Jason Saleem MD at [facility] (2ND FLR)       Review of patient's allergies indicates:   Allergen Reactions    Ace inhibitors  Other (See Comments)     Cough    Arb-angiotensin receptor antagonist Itching    Eplerenone Other (See Comments)     Marked bradycardia, 40, tiredness and weakness      Sulfa (sulfonamide antibiotics) Itching     Patient says this was 10 years ago and doesn't remember what happened       No current facility-administered medications on file prior to encounter.      Current Outpatient Medications on File Prior to Encounter   Medication Sig    albuterol 90 mcg/actuation inhaler 2 puffs every 4 hours as needed for cough, wheeze, or shortness of breath    albuterol-ipratropium (DUO-NEB) 2.5 mg-0.5 mg/3 mL nebulizer solution INHALE 1 VIAL BY NEBULIZER EVERY 6 HOURS AS NEEDED FOR WHEEZING    allopurinol (ZYLOPRIM) 100 MG tablet Take 1 tablet (100 mg total) by mouth once daily.    amLODIPine (NORVASC) 10 MG tablet Take 1 tablet (10 mg total) by mouth every evening.    aspirin (ECOTRIN) 81 MG EC tablet Take 81 mg by mouth once daily.      atorvastatin (LIPITOR) 80 MG tablet TAKE 1 TABLET (80 MG TOTAL) BY MOUTH ONCE DAILY.    carvedilol (COREG) 6.25 MG tablet Take 1 tablet (6.25 mg total) by mouth 2 (two) times daily with meals.    finasteride (PROSCAR) 5 mg tablet TAKE 1 TABLET ONCE DAILY.    fluticasone (FLONASE) 50 mcg/actuation nasal spray 2 sprays (100 mcg total) by Each Nare route once daily. (Patient taking differently: 2 sprays by Each Nare route daily as needed. )    gabapentin (NEURONTIN) 300 MG capsule Take 1 capsule (300 mg total) by mouth every evening.    isosorbide mononitrate (ISMO,MONOKET) 10 mg tablet TAKE 1 TABLET BY MOUTH EVERY DAY IN THE EVENING    meclizine (ANTIVERT) 25 mg tablet TAKE 1 TABLET (25 MG TOTAL) BY MOUTH 3 (THREE) TIMES DAILY AS NEEDED.    sodium chloride (SALINE NASAL MIST) 3 % Mist 1 spray by Nasal route 2 (two) times daily.    tamsulosin (FLOMAX) 0.4 mg Cap Take 1 capsule (0.4 mg total) by mouth once daily.    traMADol (ULTRAM) 50 mg tablet Take 1 tablet (50 mg total) by  mouth every 12 (twelve) hours as needed for Pain.    ciprofloxacin HCl (CIPRO) 500 MG tablet Take 1 tablet (500 mg total) by mouth 2 (two) times daily. for 7 days    COLCRYS 0.6 mg tablet TAKE 1 TABLET (0.6 MG TOTAL) BY MOUTH ONCE DAILY.    cyclobenzaprine (FLEXERIL) 5 MG tablet One tab po with breakfast and lunch take 2 tabs po before bedtime for up to 7 days    iron polysaccharides (NIFEREX) 150 mg iron Cap Take 1 capsule (150 mg total) by mouth 2 (two) times daily before meals.    losartan (COZAAR) 100 MG tablet Take 1 tablet (100 mg total) by mouth once daily.    mirtazapine (REMERON) 7.5 MG Tab Take 1 tablet (7.5 mg total) by mouth every evening.    montelukast (SINGULAIR) 10 mg tablet Take 1 tablet (10 mg total) by mouth every evening.    NITROSTAT 0.4 mg SL tablet PLACE 1 TABLET (0.4 MG TOTAL) UNDER THE TONGUE EVERY 5 (FIVE) MINUTES AS NEEDED FOR CHEST PAIN.    omeprazole (PRILOSEC) 20 MG capsule Take 1 capsule (20 mg total) by mouth once daily.    polyethylene glycol (GLYCOLAX) 17 gram/dose powder Take 17 g by mouth 2 (two) times daily before meals.     Family History     Problem Relation (Age of Onset)    Cancer Father    Heart disease Mother, Sister    Hypertension Brother    Pneumonia Sister    Stroke Sister    Sudden death Father        Tobacco Use    Smoking status: Never Smoker    Smokeless tobacco: Never Used   Substance and Sexual Activity    Alcohol use: No    Drug use: No    Sexual activity: Not on file     Review of Systems   Constitutional: Positive for fatigue and fever. Negative for activity change, appetite change and chills.   HENT: Negative for congestion, hearing loss, sore throat and trouble swallowing.    Eyes: Negative for photophobia and visual disturbance.   Respiratory: Positive for cough (with hemoptysis) and wheezing. Negative for shortness of breath.    Cardiovascular: Negative for chest pain, palpitations and leg swelling.   Gastrointestinal: Positive for nausea.  Negative for abdominal pain, diarrhea and vomiting.   Endocrine: Negative for polydipsia, polyphagia and polyuria.   Genitourinary: Negative for difficulty urinating, dysuria, frequency and urgency.   Musculoskeletal: Negative for back pain, neck pain and neck stiffness.   Skin: Negative for rash and wound.   Neurological: Negative for dizziness, syncope, weakness, numbness and headaches.   Psychiatric/Behavioral: Negative for confusion. The patient is not nervous/anxious.      Objective:     Vital Signs (Most Recent):  Temp: 99.9 °F (37.7 °C) (11/06/18 0209)  Pulse: 73 (11/06/18 0306)  Resp: 18 (11/06/18 0306)  BP: (!) 147/71 (11/06/18 0209)  SpO2: 95 % (11/06/18 0306) Vital Signs (24h Range):  Temp:  [97.7 °F (36.5 °C)-100 °F (37.8 °C)] 99.9 °F (37.7 °C)  Pulse:  [73-78] 73  Resp:  [16-18] 18  SpO2:  [95 %-98 %] 95 %  BP: (122-152)/(64-74) 147/71     Weight: 90.7 kg (199 lb 15.3 oz)  Body mass index is 26.38 kg/m².    Physical Exam   Constitutional: He is oriented to person, place, and time. He appears well-developed and well-nourished. No distress.   HENT:   Head: Normocephalic and atraumatic.   Some facial bruising noted around eyes and nose.   Eyes: Conjunctivae and EOM are normal. Pupils are equal, round, and reactive to light. No scleral icterus.   Neck: Normal range of motion. Neck supple. No JVD present. No tracheal deviation present. No thyromegaly present.   Cardiovascular: Normal rate, regular rhythm and intact distal pulses. Exam reveals no gallop and no friction rub.   Murmur heard.  Pulses:       Dorsalis pedis pulses are 2+ on the right side, and 2+ on the left side.   Pulmonary/Chest: Effort normal. No accessory muscle usage. No respiratory distress. He has no wheezes. He has no rhonchi. He has rales (Left lower lobe).   Abdominal: Soft. Bowel sounds are normal. He exhibits no distension and no mass. There is no tenderness. There is no rebound and no guarding.   Musculoskeletal: Normal range of  motion. He exhibits no edema, tenderness or deformity.   Neurological: He is alert and oriented to person, place, and time. He has normal strength. He exhibits normal muscle tone. Coordination normal.   Skin: Skin is warm, dry and intact. Capillary refill takes less than 2 seconds. No rash noted. He is not diaphoretic. No erythema.   Psychiatric: He has a normal mood and affect. His speech is normal and behavior is normal. Judgment and thought content normal.   Vitals reviewed.        CRANIAL NERVES     CN III, IV, VI   Pupils are equal, round, and reactive to light.  Extraocular motions are normal.        Significant Labs:   CBC:   Recent Labs   Lab 11/05/18 2216   WBC 10.30   HGB 9.6*   HCT 28.8*   *     CMP:   Recent Labs   Lab 11/05/18 2216   *   K 4.2      CO2 14*   GLU 93   BUN 81*   CREATININE 5.5*   CALCIUM 7.9*   PROT 6.5   ALBUMIN 3.5   BILITOT 0.3   ALKPHOS 78   AST 23   ALT 35   ANIONGAP 12   EGFRNONAA 9*       TSH:   Recent Labs   Lab 08/29/18  1040   TSH 1.885     Urine Studies:   Recent Labs   Lab 11/05/18  2235   COLORU Yellow   APPEARANCEUA Clear   PHUR 6.0   SPECGRAV 1.015   PROTEINUA 2+*   GLUCUA Negative   KETONESU Negative   BILIRUBINUA Negative   OCCULTUA 1+*   NITRITE Negative   UROBILINOGEN Negative   LEUKOCYTESUR Negative   RBCUA 1   WBCUA 0   BACTERIA None   HYALINECASTS 0       Significant Imaging: I have reviewed all pertinent imaging results/findings within the past 24 hours.     CT chest: IMPRESSION:  1. Left lower lobe consolidation suggestive of an infectious process.  2. 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    Assessment/Plan:     * HCAP (healthcare-associated pneumonia)    Presents with one day onset of fever and cough.  Recent hospitalization 3 weeks ago.    Supplemental oxygen.  IV Vanc and Zosyn - renal dosing.  Sputum for culture.  Consult Pulmonology - follow recommendations.  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  Dr. Guillen consulted from ED - agreeable for consultation and to keep patient at this facility.  Follow pulmonary specialty recommendations.         Chronic kidney disease, stage 5    Chronic problem. Stage 5 - non-oliguric, not on HD.  Creatinine at baseline.  Continue to monitor BUN/SCr.  Monitor electrolytes and I/O's.  Avoid nonessential nephrotoxins.  Renal dose medications for Estimated Creatinine Clearance: 12.3 mL/min (A) (based on SCr of 5.5 mg/dL (H)).           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, not on chronic dialysis    Chronic problem.  Stable.  Monitor H/H.  Continue iron supplementation.  Transfuse for hemodynamic instability and/or H/H <7/21  Lab Results   Component Value Date    HGB 9.6 (L) 11/05/2018            Benign prostatic hyperplasia without lower urinary tract symptoms    Chronic problem.  Continue Flomax and Proscar.         Gout, arthritis    Chronic. Continue Allopurinol.         CAD (coronary artery disease), 2V CABG 2007    History of CAD s/p CABG.  No s/s of angina.  Continue ASA, Statin, and BB.  Telemetry monitoring.  Monitor for s/s of ACS.       Hyperlipidemia, baseline     Chronic problem. Continue Statin therapy.  Lab Results   Component Value Date    LDLCALC 46.4 (L) 07/26/2018            Essential hypertension    Chronic problem. Controlled. Continue home medications and monitor b/p closely, adjusting as necessary.           VTE Risk Mitigation (From admission, onward)        Ordered     IP VTE HIGH RISK PATIENT  Once      11/06/18 0206     Place LOYD hose  Until discontinued      11/06/18 0206     Place sequential compression device  Until discontinued      11/06/18 0206     Reason for No Pharmacological VTE Prophylaxis  Once      11/06/18 0206             Eryn Mcbride NP  Department of Hospital Medicine   [facility]

## 2018-11-06 NOTE — PROGRESS NOTES
11/06/18 0306   Patient Assessment/Suction   Level of Consciousness (AVPU) alert   Respiratory Effort Normal;Unlabored   All Lung Fields Breath Sounds coarse   PRE-TX-O2-ETCO2   O2 Device (Oxygen Therapy) room air   SpO2 95 %   Pulse Oximetry Type Intermittent   $ Pulse Oximetry - Multiple Charge Pulse Oximetry - Multiple   Pulse 73   Resp 18   Aerosol Therapy   $ Aerosol Therapy Charges Aerosol Treatment   Respiratory Treatment Status given   SVN/Inhaler Treatment Route mask   Position During Treatment HOB at 45 degrees   Patient Tolerance good   Post-Treatment   Post-treatment Heart Rate (beats/min) 74   Post-treatment Resp Rate (breaths/min) 18   All Fields Breath Sounds aeration increased

## 2018-11-06 NOTE — ASSESSMENT & PLAN NOTE
Presents with one day onset of fever and cough.  Recent hospitalization 3 weeks ago.    Supplemental oxygen.  IV Vanc and Zosyn - renal dosing.  Sputum for culture.  Consult Pulmonology - follow recommendations.  Duo neb treatments

## 2018-11-06 NOTE — PROGRESS NOTES
Ambulating around room without difficulty.  Denies needs at this time.  Charge nurse, Leonor, notified of pulmonology consult with Dr. Guillen today.  LOYD intact to BLE.  Limb alert bracelet intact to left arm due to pt's new fistula that is not being used at present.  Call light within reach.

## 2018-11-06 NOTE — PLAN OF CARE
Problem: Occupational Therapy Goal  Goal: Occupational Therapy Goal  Outcome: Outcome(s) achieved Date Met: 11/06/18  Patient is modified independent with ADLs. No further acute OT needs. Discharging from OT services.     Kostas Alvarez OT  11/6/2018

## 2018-11-06 NOTE — CONSULTS
Micheal Hunt [id number] is a 78 y.o. male who has been consulted for vancomycin dosing.    The patient has the following labs:     Date Creatinine (mg/dl)    BUN WBC Count   11/6/2018 Estimated Creatinine Clearance: 12.3 mL/min (A) (based on SCr of 5.5 mg/dL (H)). Lab Results   Component Value Date    BUN 81 (H) 11/05/2018     Lab Results   Component Value Date    WBC 10.30 11/05/2018        Current weight is 90.7 kg (199 lb 15.3 oz)    Pneumonia    The patient received  1250 mg on 11/6 at 0225    Vancomycin will be dosed by levels.  Vancomycin level has been ordered on 11/7 at 0200.      Patient will be followed by pharmacy for changes in renal function, toxicity, and efficacy.   Thank you for allowing us to participate in this patient's care.     Karolyn Frey

## 2018-11-06 NOTE — ASSESSMENT & PLAN NOTE
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  Dr. Guillen consulted from ED - agreeable for consultation and to keep patient at this facility.  Follow pulmonary specialty recommendations.

## 2018-11-06 NOTE — CONSULTS
11/06/2018      Admit Date: 11/5/2018  Micheal Hunt  New Patient Consult    Chief Complaint   Patient presents with    Fever     Started this evening with chills and shakes. Also associated cough and coughed up blood once. Denies shortness of breath or chest pain        History of Present Illness:   Patient has been seen by me in February 2018.  Patient worked as a rancher and cottrell.  He lives in [place].  He has been exposed to hay in the past.  Has had sinus problems.  He was seen by immunology in the past.  His pneumococcal titers were low.  His IgM was low in his IgG was good. He had not had recurrent pneumonias when I saw him in February.  Patient does have a history of coughing and wheezing but not short of breath. This pulmonary functions done in January showed an FEV1 of 71%.  Patient was felt to have a mild persistent asthma problem and was recommended to use Symbicort.  He was also felt to have an immune deficiency problem and was recommended to use amoxicillin for any infections.    Patient was hospitalized with pneumonia for a couple of days over the summer.  Patient respiratory wise has been doing fairly well.    Patient came home yesterday and then had an acute chill with fever.  He had headache but no nausea or vomiting or diarrhea.  No chest pain did have some cough.  He presented to the emergency room acutely.  He did not take any antibiotics.  Patient had a CT scan did not demonstrate left lower lung pneumonia.  The radiology interpretation of his CT scan suggested  a pulmonary artery aneurysm.  I reviewed the CT scan with radiologist this a.m. and there is no pulmonary artery aneurysm .  Patient is feeling much better today.  Patient did bring up 2 tsp of blood yesterday.  Has not had hemoptysis in the past.  Patient denies taking anticoagulant therapy other than aspirin.    Patient has had both pneumonia vaccines Prevnar and Pneumovax.  Pneumococcal titers showed 9/14 were not  protected in April of last year.        PFSH:  Past Medical History:   Diagnosis Date    NICK (acute kidney injury) 7/29/2016    Allergy     Anemia, mild 12/15/2014    Arthritis     Gout    Benign essential HTN 3/27/2012    BMI 29.0-29.9,adult 5/10/2018    BPH (benign prostatic hyperplasia)     BPH (benign prostatic hyperplasia)     CAD (coronary artery disease) 2006    Chronic kidney disease     due to ibuprofen    Colon polyp     CRF (chronic renal failure), stage 5     Diverticulosis     Gastritis     GERD (gastroesophageal reflux disease)     Gout     History of colon polyps 5/3/2018    HTN (hypertension) 3/27/2012    Hyperlipidemia     Hyperlipidemia     LLL pneumonia 6/14/2018    LVH (left ventricular hypertrophy)     Mesenteric ischemia     Murmur, cardiac 3/27/2012    GRICELDA (obstructive sleep apnea)     DOES NOT USE A MACHINE    Sinus problem     Syncope and collapse      Past Surgical History:   Procedure Laterality Date    APPENDECTOMY      BLOCK-NERVE Left 5/31/2016    [facility]    CARDIAC CATHETERIZATION      COLONOSCOPY  2011    COLONOSCOPY N/A 5/3/2018    Procedure: COLONOSCOPY;  Surgeon: Messi Harris MD;  Location: [facility];  Service: Endoscopy;  Laterality: N/A;    COLONOSCOPY N/A 5/3/2018    [facility]    COLONOSCOPY N/A 9/10/2015    [facility]O    CORONARY ARTERY BYPASS GRAFT  4/2007    x 1    CYSTOSCOPY N/A 8/30/2017    [facility] at [facility] OR    CYSTOSCOPY N/A 11/10/2015    [facility]    ESOPHAGOGASTRODUODENOSCOPY (EGD) N/A 1/4/2016    Performed by Tra [facility] at [facility] ENDO (2ND FLR)    ESOPHAGOGASTRODUODENOSCOPY (EGD) N/A 10/15/2014    [facility] at [facility] ENDO    INJECTION-STEROID-EPIDURAL-TRANSFORAMINAL Left 2/25/2016    [facility] OR    JOINT  REPLACEMENT      left knee total replacement  X 3    mid leftt finger      from a cactuss    MOLE REMOVAL  2016    RADIOFREQUENCY THERMOCOAGULATION (RFTC)-NERVE-MEDIAN BRANCH-LUMBAR Left 2016    [facility] OR    rotative cuff      no rotative cuffs on bilat shoulders has pins     SHOULDER SURGERY      shoulder surgery bilat  RIGHT X 4; LEFT X 3    TRANSRECTAL ULTRASOUND GUIDED PROSTATE BIOPSY Bilateral 11/10/2015    [facility]    ULTRASOUND-ENDOSCOPIC-UPPER N/A 2017    [facility] ENDO (2ND FLR)    ULTRASOUND-ENDOSCOPIC-UPPER N/A 2016    Performed [facility] Tra [facility] at [facility] ENDO (2ND FLR)    ULTRASOUND-ENDOSCOPIC-UPPER N/A 2015    Performed by Jason Saleem MD at [facility] ENDO (2ND FLR)     Social History     Tobacco Use    Smoking status: Never Smoker    Smokeless tobacco: Never Used   Substance Use Topics    Alcohol use: No    Drug use: No     Family History   Problem Relation Age of Onset    Heart disease Mother     Sudden death Father     Cancer Father         advanced lung ca- found after 2 story fall    Stroke Sister     Pneumonia Sister          from PNA    Heart disease Sister     Hypertension Brother     Kidney cancer Neg Hx     Prostate cancer Neg Hx     Urolithiasis Neg Hx     Allergic rhinitis Neg Hx     Allergies Neg Hx     Angioedema Neg Hx     Asthma Neg Hx     Atopy Neg Hx     Eczema Neg Hx     Immunodeficiency Neg Hx     Rhinitis Neg Hx     Urticaria Neg Hx      Review of patient's allergies indicates:   Allergen Reactions    Ace inhibitors Other (See Comments)     Cough    Arb-angiotensin receptor antagonist Itching    Eplerenone Other (See Comments)     Marked bradycardia, 40, tiredness and weakness      Sulfa (sulfonamide antibiotics) Itching     Patient says this was 10 years ago and doesn't remember what happened       Performance Status:Performance  "Status:The patient's activity level is housebound activities.    Review of Systems:  a review of eleven systems covering constitutional, Psych, Eye, HEENT, Respiratory, Cardiac, GI, , Musculoskeletal, Endocrine, Dermatologicwas negative except the above mentioned abnormalities and for any pertinent findings as listed below:  pertinent positive as above, rest is good         Exam:Comprehensive exam done. BP (!) 141/75   Pulse 79   Temp 98 °F (36.7 °C) (Oral)   Resp 18   Ht 6' 1" (1.854 m)   Wt 90.7 kg (199 lb 15.3 oz)   SpO2 98%   BMI 26.38 kg/m²   Exam included Vitals as listed, and patient's appearance and affect and alertness and mood, oral exam for yeast and hygiene and pharynx lesions and Mallapatti (M) score, neck with inspection for jvd and masses and thyroid abnormalities and lymph nodes (supraclavicular and infraclavicular nodes also examined and noted if abn), chest exam included symmetry and effort and fremitus and percussion and auscultation, cardiac exam included rhythm and gallops and murmur and rubs and jvd and edema, abdominal exam for mass and hepatosplenomegaly and tenderness and hernias and bowel sounds, Musculoskeletal exam with muscle tone and posture and mobility/gait and  strenght, and skin for rashes and cyanosis and pallor and turgor, extremity for clubbing.  Findings were normal except as listed below:  Alert, chest is symmetric, no distress, normal percussion, normal fremitus and rales and rhonchi left lower lung posterior        Radiographs reviewed: view by direct vision -CT chest viewed by direct vision.  Left lower lung pneumonia seen.  I do not see anything concerning for lung cancer.      Results for orders placed during the hospital encounter of 02/25/18   X-Ray Chest 1 View    Narrative Comparison: 12/29/17    Technique: Single AP portable chest radiograph.    Findings:Stable cardiomegaly and aortic tortuosity noted. Sternotomy changes are noted consistent with prior " CABG. The lungs are clear. No pleural abnormality or pneumothorax is seen. No radiographically apparent pulmonary nodule. Cholecystectomy clips. Degenerative changes in the shoulders.    Impression 1.  Stable cardiomegaly and aortic tortuosity. Changes of prior CABG. No acute radiographic findings.        Electronically signed by: Shun Pacheco MD  Date:     02/25/18  Time:    12:49    ]    Labs     Recent Labs   Lab 11/06/18  0418   WBC 9.20   HGB 9.0*   HCT 26.7*   *     Recent Labs   Lab 11/06/18  0418   *   K 3.9      CO2 18*   BUN 77*   CREATININE 5.5*      CALCIUM 7.7*   MG 2.2   PHOS 3.7   AST 16   ALT 32   ALKPHOS 71   BILITOT 0.4   PROT 5.9*   ALBUMIN 3.2*   No results for input(s): PH, PCO2, PO2, HCO3 in the last 24 hours.  Microbiology Results (last 7 days)     ** No results found for the last 168 hours. **          Impression:  Active Hospital Problems    Diagnosis  POA    *HCAP (healthcare-associated pneumonia) [J18.9]  Yes    Hemoptysis [R04.2]  Yes    Chronic kidney disease, stage 5 [N18.5]  Yes    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, not on chronic dialysis [N18.5, D63.1]  Yes    Benign prostatic hyperplasia without lower urinary tract symptoms [N40.0]  Yes    Gout, arthritis [M10.9]  Yes    CAD (coronary artery disease), 2V CABG 2007 [I25.10]  Yes    Essential hypertension [I10]  Yes    Hyperlipidemia, baseline  [E78.5]  Yes      Resolved Hospital Problems   No resolved problems to display.     Plan:   Patient has poor pneumococcal titers, he has been vaccinated twice, he has had 2 pneumonias this year, he 78 years of age and has poor renal function.  He has asthma but is very mild.    Would recommend a sputum culture.  He might benefit from immunoglobulin therapy.  Would consider doing antibody titers again after discharge and follow-up with immunology for IgG therapy (?).    Current antibiotic coverage is more than adequate.  Consider outpatient  management of patient can ambulate well, would prefer another day or 2.

## 2018-11-06 NOTE — ASSESSMENT & PLAN NOTE
Chronic problem. Controlled. Continue home medications and monitor b/p closely, adjusting as necessary.

## 2018-11-06 NOTE — ASSESSMENT & PLAN NOTE
Chronic problem. Stage 5 - non-oliguric, not on HD.  Creatinine at baseline.  Continue to monitor BUN/SCr.  Monitor electrolytes and I/O's.  Avoid nonessential nephrotoxins.  Renal dose medications for Estimated Creatinine Clearance: 12.3 mL/min (A) (based on SCr of 5.5 mg/dL (H)).

## 2018-11-07 PROBLEM — I50.43 ACUTE ON CHRONIC COMBINED SYSTOLIC AND DIASTOLIC HEART FAILURE: Status: ACTIVE | Noted: 2018-11-07

## 2018-11-07 LAB
ANION GAP SERPL CALC-SCNC: 9 MMOL/L
AORTIC ROOT ANNULUS: 3.67 CM
AORTIC VALVE CUSP SEPERATION: 1.28 CM
AV MEAN GRADIENT: 21.48 MMHG
AV PEAK GRADIENT: 35.05 MMHG
AV VALVE AREA: 1.3 CM2
BASOPHILS # BLD AUTO: 0 K/UL
BASOPHILS NFR BLD: 0.4 %
BNP SERPL-MCNC: 1140 PG/ML
BSA FOR ECHO PROCEDURE: 2.16 M2
BUN SERPL-MCNC: 75 MG/DL
CALCIUM SERPL-MCNC: 7.5 MG/DL
CHLORIDE SERPL-SCNC: 110 MMOL/L
CO2 SERPL-SCNC: 15 MMOL/L
CREAT SERPL-MCNC: 5.5 MG/DL
CV ECHO LV RWT: 0.37 CM
DIFFERENTIAL METHOD: ABNORMAL
DOP CALC AO PEAK VEL: 2.96 M/S
DOP CALC AO VTI: 70.02 CM
DOP CALC LVOT AREA: 3.59 CM2
DOP CALC LVOT DIAMETER: 2.14 CM
DOP CALC LVOT STROKE VOLUME: 91.17 CM3
DOP CALCLVOT PEAK VEL VTI: 25.36 CM
E WAVE DECELERATION TIME: 193.95 MSEC
E/A RATIO: 1.51
E/E' RATIO: 14.71
ECHO LV POSTERIOR WALL: 1.15 CM (ref 0.6–1.1)
EOSINOPHIL # BLD AUTO: 0.2 K/UL
EOSINOPHIL NFR BLD: 3.7 %
ERYTHROCYTE [DISTWIDTH] IN BLOOD BY AUTOMATED COUNT: 16.3 %
EST. GFR  (AFRICAN AMERICAN): 10 ML/MIN/1.73 M^2
EST. GFR  (NON AFRICAN AMERICAN): 9 ML/MIN/1.73 M^2
FRACTIONAL SHORTENING: 27 % (ref 28–44)
GLUCOSE SERPL-MCNC: 102 MG/DL
HCT VFR BLD AUTO: 23.7 %
HGB BLD-MCNC: 8 G/DL
IGG SERPL-MCNC: 684 MG/DL
INTERVENTRICULAR SEPTUM: 1.15 CM (ref 0.6–1.1)
IVRT: 0.04 MSEC
LEFT ATRIUM SIZE: 4.74 CM
LEFT INTERNAL DIMENSION IN SYSTOLE: 4.49 CM (ref 2.1–4)
LEFT VENTRICLE DIASTOLIC VOLUME INDEX: 87.87 ML/M2
LEFT VENTRICLE DIASTOLIC VOLUME: 189.8 ML
LEFT VENTRICLE MASS INDEX: 142.7 G/M2
LEFT VENTRICLE SYSTOLIC VOLUME INDEX: 42.6 ML/M2
LEFT VENTRICLE SYSTOLIC VOLUME: 92.08 ML
LEFT VENTRICULAR INTERNAL DIMENSION IN DIASTOLE: 6.14 CM (ref 3.5–6)
LEFT VENTRICULAR MASS: 308.22 G
LV LATERAL E/E' RATIO: 11.44
LV SEPTAL E/E' RATIO: 20.6
LYMPHOCYTES # BLD AUTO: 1.4 K/UL
LYMPHOCYTES NFR BLD: 24.1 %
MCH RBC QN AUTO: 30.8 PG
MCHC RBC AUTO-ENTMCNC: 33.7 G/DL
MCV RBC AUTO: 91 FL
MONOCYTES # BLD AUTO: 0.6 K/UL
MONOCYTES NFR BLD: 10.3 %
MV PEAK A VEL: 0.68 M/S
MV PEAK E VEL: 1.03 M/S
MV STENOSIS PRESSURE HALF TIME: 56.24 MS
MV VALVE AREA P 1/2 METHOD: 3.91 CM2
NEUTROPHILS # BLD AUTO: 3.5 K/UL
NEUTROPHILS NFR BLD: 61.5 %
PISA TR MAX VEL: 2.46 M/S
PLATELET # BLD AUTO: 115 K/UL
PMV BLD AUTO: 8.9 FL
POTASSIUM SERPL-SCNC: 4.1 MMOL/L
PV PEAK VELOCITY: 1 CM/S
RA PRESSURE: 3 MMHG
RBC # BLD AUTO: 2.59 M/UL
RIGHT VENTRICULAR END-DIASTOLIC DIMENSION: 3.47 CM
SODIUM SERPL-SCNC: 134 MMOL/L
TDI LATERAL: 0.09
TDI SEPTAL: 0.05
TDI: 0.07
TR MAX PG: 24.21 MMHG
TRICUSPID ANNULAR PLANE SYSTOLIC EXCURSION: 1.8 CM
TV REST PULMONARY ARTERY PRESSURE: 27.21 MMHG
VANCOMYCIN SERPL-MCNC: 8.8 UG/ML
WBC # BLD AUTO: 5.7 K/UL

## 2018-11-07 PROCEDURE — 25000003 PHARM REV CODE 250: Performed by: NURSE PRACTITIONER

## 2018-11-07 PROCEDURE — 63600175 PHARM REV CODE 636 W HCPCS: Performed by: INTERNAL MEDICINE

## 2018-11-07 PROCEDURE — 25000003 PHARM REV CODE 250: Performed by: HOSPITALIST

## 2018-11-07 PROCEDURE — 96367 TX/PROPH/DG ADDL SEQ IV INF: CPT

## 2018-11-07 PROCEDURE — 99900035 HC TECH TIME PER 15 MIN (STAT)

## 2018-11-07 PROCEDURE — 12000002 HC ACUTE/MED SURGE SEMI-PRIVATE ROOM

## 2018-11-07 PROCEDURE — 99232 SBSQ HOSP IP/OBS MODERATE 35: CPT | Mod: ,,, | Performed by: INTERNAL MEDICINE

## 2018-11-07 PROCEDURE — 63600175 PHARM REV CODE 636 W HCPCS: Performed by: HOSPITALIST

## 2018-11-07 PROCEDURE — 25000003 PHARM REV CODE 250: Performed by: INTERNAL MEDICINE

## 2018-11-07 PROCEDURE — 36415 COLL VENOUS BLD VENIPUNCTURE: CPT

## 2018-11-07 PROCEDURE — 94640 AIRWAY INHALATION TREATMENT: CPT

## 2018-11-07 PROCEDURE — 63600175 PHARM REV CODE 636 W HCPCS: Performed by: NURSE PRACTITIONER

## 2018-11-07 PROCEDURE — 80048 BASIC METABOLIC PNL TOTAL CA: CPT

## 2018-11-07 PROCEDURE — 85025 COMPLETE CBC W/AUTO DIFF WBC: CPT

## 2018-11-07 PROCEDURE — 94761 N-INVAS EAR/PLS OXIMETRY MLT: CPT

## 2018-11-07 PROCEDURE — 87070 CULTURE OTHR SPECIMN AEROBIC: CPT

## 2018-11-07 PROCEDURE — 25000242 PHARM REV CODE 250 ALT 637 W/ HCPCS: Performed by: NURSE PRACTITIONER

## 2018-11-07 PROCEDURE — 83880 ASSAY OF NATRIURETIC PEPTIDE: CPT

## 2018-11-07 PROCEDURE — 87205 SMEAR GRAM STAIN: CPT

## 2018-11-07 RX ORDER — IPRATROPIUM BROMIDE AND ALBUTEROL SULFATE 2.5; .5 MG/3ML; MG/3ML
3 SOLUTION RESPIRATORY (INHALATION) EVERY 6 HOURS PRN
Status: DISCONTINUED | OUTPATIENT
Start: 2018-11-07 | End: 2018-11-08

## 2018-11-07 RX ORDER — SODIUM BICARBONATE 650 MG/1
650 TABLET ORAL 3 TIMES DAILY
Status: DISCONTINUED | OUTPATIENT
Start: 2018-11-07 | End: 2018-11-09 | Stop reason: HOSPADM

## 2018-11-07 RX ORDER — FUROSEMIDE 10 MG/ML
60 INJECTION INTRAMUSCULAR; INTRAVENOUS ONCE
Status: COMPLETED | OUTPATIENT
Start: 2018-11-07 | End: 2018-11-07

## 2018-11-07 RX ORDER — PREDNISONE 20 MG/1
20 TABLET ORAL 2 TIMES DAILY
Status: COMPLETED | OUTPATIENT
Start: 2018-11-07 | End: 2018-11-07

## 2018-11-07 RX ADMIN — LOSARTAN POTASSIUM 100 MG: 25 TABLET ORAL at 08:11

## 2018-11-07 RX ADMIN — ALLOPURINOL 100 MG: 100 TABLET ORAL at 08:11

## 2018-11-07 RX ADMIN — FLUTICASONE PROPIONATE 100 MCG: 50 SPRAY, METERED NASAL at 10:11

## 2018-11-07 RX ADMIN — TAMSULOSIN HYDROCHLORIDE 0.4 MG: 0.4 CAPSULE ORAL at 08:11

## 2018-11-07 RX ADMIN — ZOLPIDEM TARTRATE 5 MG: 5 TABLET ORAL at 08:11

## 2018-11-07 RX ADMIN — ASPIRIN 81 MG: 81 TABLET, COATED ORAL at 08:11

## 2018-11-07 RX ADMIN — ACETAMINOPHEN 1000 MG: 500 TABLET, FILM COATED ORAL at 07:11

## 2018-11-07 RX ADMIN — CARVEDILOL 6.25 MG: 6.25 TABLET, FILM COATED ORAL at 07:11

## 2018-11-07 RX ADMIN — FINASTERIDE 5 MG: 5 TABLET, FILM COATED ORAL at 08:11

## 2018-11-07 RX ADMIN — SODIUM BICARBONATE 650 MG TABLET 650 MG: at 02:11

## 2018-11-07 RX ADMIN — MIRTAZAPINE 7.5 MG: 7.5 TABLET ORAL at 08:11

## 2018-11-07 RX ADMIN — PANTOPRAZOLE SODIUM 40 MG: 40 TABLET, DELAYED RELEASE ORAL at 08:11

## 2018-11-07 RX ADMIN — AMLODIPINE BESYLATE 10 MG: 5 TABLET ORAL at 08:11

## 2018-11-07 RX ADMIN — TRAMADOL HYDROCHLORIDE 50 MG: 50 TABLET, FILM COATED ORAL at 01:11

## 2018-11-07 RX ADMIN — FERROUS SULFATE TAB EC 325 MG (65 MG FE EQUIVALENT) 325 MG: 325 (65 FE) TABLET DELAYED RESPONSE at 08:11

## 2018-11-07 RX ADMIN — GABAPENTIN 300 MG: 300 CAPSULE ORAL at 08:11

## 2018-11-07 RX ADMIN — DOCUSATE SODIUM 100 MG: 100 CAPSULE, LIQUID FILLED ORAL at 08:11

## 2018-11-07 RX ADMIN — IPRATROPIUM BROMIDE AND ALBUTEROL SULFATE 3 ML: .5; 3 SOLUTION RESPIRATORY (INHALATION) at 02:11

## 2018-11-07 RX ADMIN — SODIUM BICARBONATE 650 MG TABLET 650 MG: at 10:11

## 2018-11-07 RX ADMIN — SODIUM BICARBONATE 650 MG TABLET 650 MG: at 08:11

## 2018-11-07 RX ADMIN — PREDNISONE 20 MG: 20 TABLET ORAL at 08:11

## 2018-11-07 RX ADMIN — FUROSEMIDE 60 MG: 10 INJECTION, SOLUTION INTRAMUSCULAR; INTRAVENOUS at 10:11

## 2018-11-07 RX ADMIN — PIPERACILLIN AND TAZOBACTAM 2.25 G: 2; .25 INJECTION, POWDER, LYOPHILIZED, FOR SOLUTION INTRAVENOUS; PARENTERAL at 08:11

## 2018-11-07 RX ADMIN — CARVEDILOL 6.25 MG: 6.25 TABLET, FILM COATED ORAL at 04:11

## 2018-11-07 RX ADMIN — VANCOMYCIN HYDROCHLORIDE 1250 MG: 1 INJECTION, POWDER, LYOPHILIZED, FOR SOLUTION INTRAVENOUS at 02:11

## 2018-11-07 RX ADMIN — PREDNISONE 20 MG: 20 TABLET ORAL at 10:11

## 2018-11-07 RX ADMIN — MONTELUKAST SODIUM 10 MG: 10 TABLET, COATED ORAL at 08:11

## 2018-11-07 RX ADMIN — PIPERACILLIN AND TAZOBACTAM 2.25 G: 2; .25 INJECTION, POWDER, LYOPHILIZED, FOR SOLUTION INTRAVENOUS; PARENTERAL at 04:11

## 2018-11-07 RX ADMIN — IPRATROPIUM BROMIDE AND ALBUTEROL SULFATE 3 ML: .5; 3 SOLUTION RESPIRATORY (INHALATION) at 07:11

## 2018-11-07 RX ADMIN — PIPERACILLIN AND TAZOBACTAM 2.25 G: 2; .25 INJECTION, POWDER, LYOPHILIZED, FOR SOLUTION INTRAVENOUS; PARENTERAL at 01:11

## 2018-11-07 RX ADMIN — Medication 1 CAPSULE: at 08:11

## 2018-11-07 RX ADMIN — TRAMADOL HYDROCHLORIDE 50 MG: 50 TABLET, FILM COATED ORAL at 02:11

## 2018-11-07 RX ADMIN — ATORVASTATIN CALCIUM 80 MG: 40 TABLET, FILM COATED ORAL at 08:11

## 2018-11-07 RX ADMIN — ISOSORBIDE MONONITRATE 10 MG: 10 TABLET ORAL at 08:11

## 2018-11-07 NOTE — NURSING
"Pt. Requesting to remove teds. Teaching done. "i walk around a lot" teds removed. Will cont to timo.  "

## 2018-11-07 NOTE — PROGRESS NOTES
11/06/18 8410   Patient Assessment/Suction   Level of Consciousness (AVPU) alert   Respiratory Effort Normal;Unlabored   PRE-TX-O2-ETCO2   O2 Device (Oxygen Therapy) room air   SpO2 (!) 93 %   Pulse Oximetry Type Intermittent   Pulse (!) 57   Resp 18   Aerosol Therapy   $ Aerosol Therapy Charges Refused   Respiratory Treatment Status refused

## 2018-11-07 NOTE — PROGRESS NOTES
[facility] Hospital Medicine  Progress Note    Patient Name: Micheal Hunt  MRN: [id number]  Patient Class: IP- Inpatient   Admission Date: 11/5/2018  Length of Stay: 1 days  Attending Physician: Nandini Herndon MD  Primary Care Provider: Pk Lakhani MD        Subjective:     Principal Problem:HCAP (healthcare-associated pneumonia)    HPI:  Micheal Hunt is a 79 yo male with PMHx of CKD stage 5, CAD, HTN, BPH, and HLD.  He was admitted to the service of hospital medicine with HCAP.  He presented to the ED with a one day onset of fever and cough.  Symptoms were associated with fatigue, nausea, wheezing and hemoptysis. He stated that he was recently admitted for a stomach virus about three weeks ago.  He denied chest pain, sob, abdominal pain, vomiting, diarrhea, dysuria and leg swelling.    Hospital Course:  No notes on file    Interval History: No significant events occurred overnight. Patient still reports mild shortness of breath with exertion that improves after breathing treatment. He also complains of a sore throat and nasal congestion.         Review of Systems   Constitutional: Positive for fatigue. Negative for appetite change, chills and diaphoresis.   HENT: Positive for congestion and sore throat. Negative for hearing loss.    Respiratory: Positive for cough and shortness of breath. Negative for wheezing.    Cardiovascular: Negative for chest pain, palpitations and leg swelling.   Gastrointestinal: Positive for abdominal pain. Negative for blood in stool and nausea.   Musculoskeletal: Negative for arthralgias, back pain and myalgias.   Neurological: Negative for dizziness, syncope and light-headedness.   Psychiatric/Behavioral: Negative for agitation and confusion. The patient is not nervous/anxious.      Objective:     Vital Signs (Most Recent):  Temp: 98.5 °F (36.9 °C) (11/07/18 1145)  Pulse: 62 (11/07/18 1145)  Resp: 18 (11/07/18 1145)  BP: (!) 141/69 (11/07/18  1145)  SpO2: (!) 94 % (11/07/18 1145) Vital Signs (24h Range):  Temp:  [96.7 °F (35.9 °C)-98.5 °F (36.9 °C)] 98.5 °F (36.9 °C)  Pulse:  [57-79] 62  Resp:  [16-20] 18  SpO2:  [92 %-97 %] 94 %  BP: (119-141)/(62-72) 141/69     Weight: 89.2 kg (196 lb 11.2 oz)  Body mass index is 25.95 kg/m².    Intake/Output Summary (Last 24 hours) at 11/7/2018 1440  Last data filed at 11/7/2018 1244  Gross per 24 hour   Intake 1320 ml   Output 2000 ml   Net -680 ml      Physical Exam   Constitutional: He is oriented to person, place, and time. He appears well-developed and well-nourished.   HENT:   Head: Normocephalic and atraumatic.   Nose: Nose normal.   Mouth/Throat: Oropharynx is clear and moist. No erythema noted and no lymphadenopathy   Eyes: Conjunctivae and EOM are normal. Pupils are equal, round, and reactive to light.   Neck: Normal range of motion. Neck supple.   Cardiovascular: Normal rate, regular rhythm and intact distal pulses.   Murmur (ASM)/LSB heard.  Pulmonary/Chest: Effort normal. He has wheezes.   Coarse  With few crackles  Abdominal: Soft. Bowel sounds are normal. There is no tenderness.   Musculoskeletal: Normal range of motion.   Neurological: He is alert and oriented to person, place, and time.   Skin: Skin is warm and dry.   AVF to LUE +T/B   Psychiatric: He has a normal mood and affect. His behavior is normal.   Nursing note and vitals reviewed.      Significant Labs:   BMP:   Recent Labs   Lab 11/06/18 0418 11/07/18 0415    102   * 134*   K 3.9 4.1    110   CO2 18* 15*   BUN 77* 75*   CREATININE 5.5* 5.5*   CALCIUM 7.7* 7.5*   MG 2.2  --      CBC:   Recent Labs   Lab 11/05/18  2216 11/06/18 0418 11/07/18 0415   WBC 10.30 9.20 5.70   HGB 9.6* 9.0* 8.0*   HCT 28.8* 26.7* 23.7*   * 126* 115*       Significant Imaging: I have reviewed and interpreted all pertinent imaging results/findings within the past 24 hours.    Assessment/Plan:      * HCAP (healthcare-associated pneumonia)     Presents with one day onset of fever and cough.  Recent hospitalization 3 weeks ago.    Supplemental oxygen.  IV Vanc and Zosyn - renal dosing.  Sputum for culture.  Consult Pulmonology - follow recommendations.  Duo neb treatments  procalcitonin negative.       Acute on chronic combined systolic and diastolic heart failure    6/2018 EF 46% with DD. Repeat echo  Lasix 60 mg IV x 1 dose.  Accurate I/O  Check BNP  Renal/Low Na diet       Hypocalcemia    Recent PTH intact normal  Trend Ca       Hyponatremia    Possibly due to hypervolemia.        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  Dr. Guillen consulted from ED - agreeable for consultation and to keep patient at this facility.  Follow pulmonary specialty recommendations.  resolved         Chronic kidney disease, stage 5    Chronic problem. Stage 5 - non-oliguric, not on HD.  Creatinine at baseline.  Continue to monitor BUN/SCr.  Monitor electrolytes and I/O's.  Avoid nonessential nephrotoxins.  Renal dose medications for Estimated Creatinine Clearance: 12.3 mL/min (A) (based on SCr of 5.5 mg/dL (H)).   Consult nephrology.            Thrombocytopenia    Trend CBC. stable       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, not on chronic dialysis    Chronic problem.  Stable.  Monitor H/H.  Continue iron supplementation.  Transfuse for hemodynamic instability and/or H/H <7/21  Lab Results   Component Value Date    HGB 8.0 (L) 11/07/2018            Benign prostatic hyperplasia without lower urinary tract symptoms    Chronic problem.  Continue Flomax and Proscar.         Gout, arthritis    Chronic. Continue Allopurinol.         CAD (coronary artery disease), 2V CABG 2007    History of CAD s/p CABG.  No s/s of angina.  Continue ASA, Statin, and BB.  Telemetry monitoring.  Monitor for s/s of ACS.       Hyperlipidemia, baseline     Chronic problem. Continue Statin therapy.  Lab Results   Component Value  Date    LDLCALC 46.4 (L) 07/26/2018            Essential hypertension    Chronic problem. Controlled. Continue home medications and monitor b/p closely, adjusting as necessary.           VTE Risk Mitigation (From admission, onward)        Ordered     IP VTE HIGH RISK PATIENT  Once      11/06/18 0206     Place LOYD hose  Until discontinued      11/06/18 0206     Place sequential compression device  Until discontinued      11/06/18 0206     Reason for No Pharmacological VTE Prophylaxis  Once      11/06/18 0206        Discussed POC with patient. Discussed with Dr. Cary. Agrees with Lasix x 1 dose.         Bell Wilkinson NP  Department of Hospital Medicine   [facility]

## 2018-11-07 NOTE — NURSING
Awake and alert sitting at bedside No distress Complaining of sore throat and neck pain upon movement Explained pain med schedule Voiced understanding Respirations even and unlabored Call light in reach Side rails x 2.

## 2018-11-07 NOTE — PLAN OF CARE
Problem: Patient Care Overview  Goal: Plan of Care Review  Outcome: Ongoing (interventions implemented as appropriate)  Patient receiving aerosol tx via duoneb now and [id number].  Hr 71 and 02 saturation 95% on room air.

## 2018-11-07 NOTE — PROGRESS NOTES
Evaluated PPD site. Mild erythema and minimal induration. Likely allergic reaction at site. CXR normal with no signs of Tb. No symptoms of TB. No cough, fever, chills, shortness of breath.

## 2018-11-07 NOTE — ASSESSMENT & PLAN NOTE
6/2018 EF 46% with DD. Repeat echo  Lasix 60 mg IV x 1 dose.  Accurate I/O  Check BNP  Renal/Low Na diet

## 2018-11-07 NOTE — ASSESSMENT & PLAN NOTE
Chronic problem. Stage 5 - non-oliguric, not on HD.  Creatinine at baseline.  Continue to monitor BUN/SCr.  Monitor electrolytes and I/O's.  Avoid nonessential nephrotoxins.  Renal dose medications for Estimated Creatinine Clearance: 12.3 mL/min (A) (based on SCr of 5.5 mg/dL (H)).   Consult nephrology.

## 2018-11-07 NOTE — MEDICAL/APP STUDENT
Subjective:       Patient ID: Micheal Hunt is a 78 y.o. male.    Chief Complaint: Fever (Started this evening with chills and shakes. Also associated cough and coughed up blood once. Denies shortness of breath or chest pain )    No significant events occurred overnight. Patient still reports shortness of breath with exertion that improves after breathing treatment. He also complains of a sore throat and nasal congestion.       Review of Systems   Constitutional: Negative for chills and fever.   HENT: Positive for congestion and sore throat.    Respiratory: Positive for shortness of breath. Negative for cough.    Cardiovascular: Negative for palpitations and leg swelling.   Neurological: Negative for weakness and headaches.       Objective:      Physical Exam   Constitutional: He is oriented to person, place, and time. He appears well-developed and well-nourished. No distress.   HENT:   Mouth/Throat: No posterior oropharyngeal erythema.   Eyes: Conjunctivae and EOM are normal. Pupils are equal, round, and reactive to light.   Neck: Normal range of motion. Neck supple.   Cardiovascular: Normal rate, regular rhythm, normal heart sounds and intact distal pulses. Exam reveals no gallop.   No murmur heard.  Pulmonary/Chest: Effort normal. He has wheezes.   Musculoskeletal: Normal range of motion. He exhibits no edema.   Lymphadenopathy:     He has no cervical adenopathy.   Neurological: He is alert and oriented to person, place, and time.   Skin: Skin is warm and dry.   Psychiatric: He has a normal mood and affect. His behavior is normal. Judgment and thought content normal.       Assessment:       1. HCAP (healthcare-associated pneumonia)    2. Hemoptysis    3. Immunoglobulin deficiency    4. Mild asthma with acute exacerbation, unspecified whether persistent        Plan:       1. HCAP   Continue ATB, give PO prednisone, monitor for fever, continue neb treatments, provide supplemental oxygen, follow sputum  culture. Pulmonary consulted-- will follow recommendations.     2. Chronic kidney disease   Consult nephrology. Trend BUN and Cr, closely monitor I/Os and electrolytes. PO sodium bicarb for Co2 of 15. Follow nephrology recommendations.     3. Anemia   Chronic due to stage 5 kidney disease. Asymptomatic. Pt not on HD. Continue iron supplementation.    4. Hypertension, Chronic   Continue home amlodipine, carvedilol, losartan. Closely monitor blood pressure.     5. Coronary artery disease   Continue aspirin and statin. Monitor for symptoms of ACS.

## 2018-11-07 NOTE — ASSESSMENT & PLAN NOTE
Chronic problem.  Stable.  Monitor H/H.  Continue iron supplementation.  Transfuse for hemodynamic instability and/or H/H <7/21  Lab Results   Component Value Date    HGB 8.0 (L) 11/07/2018

## 2018-11-07 NOTE — SUBJECTIVE & OBJECTIVE
Interval History: No significant events occurred overnight. Patient still reports mild shortness of breath with exertion that improves after breathing treatment. He also complains of a sore throat and nasal congestion.         Review of Systems   Constitutional: Positive for fatigue. Negative for appetite change, chills and diaphoresis.   HENT: Positive for congestion and sore throat. Negative for hearing loss.    Respiratory: Positive for cough and shortness of breath. Negative for wheezing.    Cardiovascular: Negative for chest pain, palpitations and leg swelling.   Gastrointestinal: Positive for abdominal pain. Negative for blood in stool and nausea.   Musculoskeletal: Negative for arthralgias, back pain and myalgias.   Neurological: Negative for dizziness, syncope and light-headedness.   Psychiatric/Behavioral: Negative for agitation and confusion. The patient is not nervous/anxious.      Objective:     Vital Signs (Most Recent):  Temp: 98.5 °F (36.9 °C) (11/07/18 1145)  Pulse: 62 (11/07/18 1145)  Resp: 18 (11/07/18 1145)  BP: (!) 141/69 (11/07/18 1145)  SpO2: (!) 94 % (11/07/18 1145) Vital Signs (24h Range):  Temp:  [96.7 °F (35.9 °C)-98.5 °F (36.9 °C)] 98.5 °F (36.9 °C)  Pulse:  [57-79] 62  Resp:  [16-20] 18  SpO2:  [92 %-97 %] 94 %  BP: (119-141)/(62-72) 141/69     Weight: 89.2 kg (196 lb 11.2 oz)  Body mass index is 25.95 kg/m².    Intake/Output Summary (Last 24 hours) at 11/7/2018 1440  Last data filed at 11/7/2018 1244  Gross per 24 hour   Intake 1320 ml   Output 2000 ml   Net -680 ml      Physical Exam   Constitutional: He is oriented to person, place, and time. He appears well-developed and well-nourished.   HENT:   Head: Normocephalic and atraumatic.   Nose: Nose normal.   Mouth/Throat: Oropharynx is clear and moist.   Eyes: Conjunctivae and EOM are normal. Pupils are equal, round, and reactive to light.   Neck: Normal range of motion. Neck supple.   Cardiovascular: Normal rate, regular rhythm and  intact distal pulses.   Murmur (ASM) heard.  Pulmonary/Chest: Effort normal. He has wheezes.   Coarse with rhonchi    Abdominal: Soft. Bowel sounds are normal. There is no tenderness.   Musculoskeletal: Normal range of motion.   Neurological: He is alert and oriented to person, place, and time.   Skin: Skin is warm and dry.   AVF to LUE +T/B   Psychiatric: He has a normal mood and affect. His behavior is normal.   Nursing note and vitals reviewed.      Significant Labs:   BMP:   Recent Labs   Lab 11/06/18 0418 11/07/18 0415    102   * 134*   K 3.9 4.1    110   CO2 18* 15*   BUN 77* 75*   CREATININE 5.5* 5.5*   CALCIUM 7.7* 7.5*   MG 2.2  --      CBC:   Recent Labs   Lab 11/05/18 2216 11/06/18 0418 11/07/18 0415   WBC 10.30 9.20 5.70   HGB 9.6* 9.0* 8.0*   HCT 28.8* 26.7* 23.7*   * 126* 115*       Significant Imaging: I have reviewed and interpreted all pertinent imaging results/findings within the past 24 hours.

## 2018-11-07 NOTE — PROGRESS NOTES
Progress Note  PULMONARY    Admit Date: 11/5/2018 11/07/2018      SUBJECTIVE:     Nov 7, sl wheezes, feels better, ask for tramadol tid (renal failure max dose 200/d),  Follows dr Bishop        [facility] and Allergies reviewed.    OBJECTIVE:     Vitals (Most recent):  Vitals:    11/07/18 0713   BP: (!) 141/72   Pulse: 71   Resp: 16   Temp: 96.8 °F (36 °C)       Vitals (24 hour range):  Temp:  [96.7 °F (35.9 °C)-97.8 °F (36.6 °C)]   Pulse:  [57-79]   Resp:  [16-20]   BP: (119-141)/(62-72)   SpO2:  [92 %-97 %]       Intake/Output Summary (Last 24 hours) at 11/7/2018 0819  Last data filed at 11/7/2018 0600  Gross per 24 hour   Intake 990 ml   Output 1000 ml   Net -10 ml          Physical Exam:  The patient's neuro status (alertness,orientation,cognitive function,motor skills,), pharyngeal exam (oral lesions, hygiene, abn dentition,), Neck (jvd,mass,thyroid,nodes in neck and above/below clavicle),RESPIRATORY(symmetry,effort,fremitus,percussion,auscultation),  Cor(rhythm,heart tones including gallops,perfusion,edema)ABD(distention,hepatic&splenomegaly,tenderness,masses), Skin(rash,cyanosis),Psyc(affect,judgement,).  Exam negative except for these pertinent findings:    Alert, sl bilat wheezes type expiration- minimal.     Radiographs reviewed: view by direct vision  No new  Results for orders placed during the hospital encounter of 02/25/18   X-Ray Chest 1 View    Narrative Comparison: 12/29/17    Technique: Single AP portable chest radiograph.    Findings:Stable cardiomegaly and aortic tortuosity noted. Sternotomy changes are noted consistent with prior CABG. The lungs are clear. No pleural abnormality or pneumothorax is seen. No radiographically apparent pulmonary nodule. Cholecystectomy clips. Degenerative changes in the shoulders.    Impression 1.  Stable cardiomegaly and aortic tortuosity. Changes of prior CABG. No acute radiographic findings.        Electronically signed by: Shun Pacheco MD  Date:      02/25/18  Time:    12:49    ]    Labs     Recent Labs   Lab 11/07/18  0415   WBC 5.70   HGB 8.0*   HCT 23.7*   *     Recent Labs   Lab 11/07/18  0415   *   K 4.1      CO2 15*   BUN 75*   CREATININE 5.5*      CALCIUM 7.5*   No results for input(s): PH, PCO2, PO2, HCO3 in the last 24 hours.  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]  Yes    Hyponatremia [E87.1]  Yes    Hypocalcemia [E83.51]  Yes    Mild malnutrition [E44.1]  Yes    Debility [R53.81]  Yes    Chronic kidney disease, stage 5 [N18.5]  Yes    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, not on chronic dialysis [N18.5, D63.1]  Yes    Thrombocytopenia [D69.6]  Yes     Chronic      Benign prostatic hyperplasia without lower urinary tract symptoms [N40.0]  Yes    Gout, arthritis [M10.9]  Yes    CAD (coronary artery disease), 2V CABG 2007 [I25.10]  Yes    Essential hypertension [I10]  Yes    Hyperlipidemia, baseline  [E78.5]  Yes      Resolved Hospital Problems   No resolved problems to display.     Results for ARTEM WEI (MRN [id number]) as of 11/7/2018 08:09   Ref. Range 11/5/2018 22:16 11/6/2018 04:18 11/7/2018 04:15   WBC Latest Ref Range: 3.90 - 12.70 K/uL 10.30 9.20 5.70             Plan:   Nov 7, sl wheezes, feels better, ask for tramadol tid (renal failure max dose 200/d),  Follows dr Bishop,  Pt ambulating and seems to be doing well.    Proteinuria noted, non gap acidosis likely renal- bicarb reasonable.  There is no pulmonary artery aneursym.      Pt was on oral cipro sice 10/ bid with renal failure and apparently  Failed. There are no  blood cultures resulted on epic at this time?  No sputum submitted?   Order bicarb and dose prednisone x2.      Best to monitor another day as failed outpt abx?      Addendum- pt says did not take cipro for fear side effects.                                  .

## 2018-11-07 NOTE — ASSESSMENT & PLAN NOTE
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  Dr. Guillen consulted from ED - agreeable for consultation and to keep patient at this facility.  Follow pulmonary specialty recommendations.  resolved

## 2018-11-07 NOTE — CONSULTS
Micheal Hunt [id number] is a 78 y.o. male who has been consulted for vancomycin dosing.    The patient has the following labs:     Date Creatinine (mg/dl)    BUN WBC Count   11/7/2018 Estimated Creatinine Clearance: 12.3 mL/min (A) (based on SCr of 5.5 mg/dL (H)). Lab Results   Component Value Date    BUN 77 (H) 11/06/2018     Lab Results   Component Value Date    WBC 9.20 11/06/2018        Current weight is 90.7 kg (199 lb 15.3 oz)    Pt received vancomycin 1250 mg on 11/6 at 0225.  Vancomycin level from 11/7 at 2340 was 8.8 mg/dL after one dose.  Target level range is 15-20 mg/dL.   I anticipate vancomycin is trending therapeutic. Pharmacy will re-dose vancomycin 1250 (15 mg/kg/dose) on 11/7 at 0230. A vancomycin level has been ordered before the next dose on 11/8 at 0200.    Patient will be followed by pharmacy for changes in renal function, toxicity, and efficacy.  Thank you for allowing us to participate in this patient's care.     Karolyn Frey

## 2018-11-07 NOTE — CONSULTS
Nephrology Consult Note        Patient Name: Micheal Hunt  MRN: [id number]    Patient Class: IP- Inpatient   Admission Date: 11/5/2018  Length of Stay: 1 days  Date of Service: 11/7/2018    Attending Physician: Nandini Herndon MD  Primary Care Provider: Pk Lakhani MD    Reason for Consult: ckd4, anemia, htn, shpt    SUBJECTIVE:     HPI: 78M with CKD stage 5 and working fistula, followed by Dr. Rojo, HTN, BPH, was admitted with PNA symptoms and treated with IV abx, seen by Pulm as well. He was in recently with weakness and feeling unwell, did not start HD at that time. Labs show stable renal function at this time, he feels better.    Past Medical History:   Diagnosis Date    NICK (acute kidney injury) 7/29/2016    Allergy     Anemia, mild 12/15/2014    Arthritis     Gout    Benign essential HTN 3/27/2012    BMI 29.0-29.9,adult 5/10/2018    BPH (benign prostatic hyperplasia)     BPH (benign prostatic hyperplasia)     CAD (coronary artery disease) 2006    Chronic kidney disease     due to ibuprofen    Colon polyp     CRF (chronic renal failure), stage 5     Diverticulosis     Gastritis     GERD (gastroesophageal reflux disease)     Gout     History of colon polyps 5/3/2018    HTN (hypertension) 3/27/2012    Hyperlipidemia     Hyperlipidemia     LLL pneumonia 6/14/2018    LVH (left ventricular hypertrophy)     Mesenteric ischemia     Murmur, cardiac 3/27/2012    GRICELDA (obstructive sleep apnea)     DOES NOT USE A MACHINE    Sinus problem     Syncope and collapse      Past Surgical History:   Procedure Laterality Date    APPENDECTOMY      BLOCK-NERVE Left 5/31/2016    [facility]    CARDIAC CATHETERIZATION      COLONOSCOPY  2011    COLONOSCOPY N/A 5/3/2018    Procedure: COLONOSCOPY;  Surgeon: Messi Harris MD;  Location: [facility];  Service: Endoscopy;  Laterality: N/A;    COLONOSCOPY N/A 5/3/2018    [facility]O     COLONOSCOPY N/A 9/10/2015    [facility] at [facility]    CORONARY ARTERY BYPASS GRAFT  4/2007    x 1    CYSTOSCOPY N/A 2017    [facility]    CYSTOSCOPY N/A 11/10/2015    [facility] at [facility] OR    ESOPHAGOGASTRODUODENOSCOPY (EGD) N/A 2016    Performed by Tra [facility] at [facility] ENDO (2ND FLR)    ESOPHAGOGASTRODUODENOSCOPY (EGD) N/A 10/15/2014    Per[facility] by [facility] at [facility] ENDO    INJECTION-STEROID-EPIDURAL-TRANSFORAMINAL Left 2016    [facility] at [facility] OR    JOINT REPLACEMENT      left knee total replacement  X 3    mid leftt finger      from a cactuss    MOLE REMOVAL  2016    RADIOFREQUENCY THERMOCOAGULATION (RFTC)-NERVE-MEDIAN BRANCH-LUMBAR Left 2016    [facility] at [facility] OR    rotative cuff      no rotative cuffs on bilat shoulders has pins     SHOULDER SURGERY      shoulder surgery bilat  RIGHT X 4; LEFT X 3    TRANSRECTAL ULTRASOUND GUIDED PROSTATE BIOPSY Bilateral 11/10/2015    [facility] at [facility] OR    ULTRASOUND-ENDOSCOPIC-UPPER N/A 2017    Performed by [facility] at [facility] ENDO (2ND FLR)    ULTRASOUND-ENDOSCOPIC-UPPER N/A 2016    Performed by Tra [facility] at [facility] ENDO (2ND FLR)    ULTRASOUND-ENDOSCOPIC-UPPER N/A 2015    Performed by Jason Saleem MD at [facility] ENDO (2ND FLR)     Family History   Problem Relation Age of Onset    Heart disease Mother     Sudden death Father     Cancer Father         advanced lung ca- found after 2 story fall    Stroke Sister     Pneumonia Sister          from PNA    Heart disease Sister     Hypertension Brother     Kidney cancer Neg Hx     Prostate cancer Neg Hx     Urolithiasis Neg Hx     Allergic rhinitis Neg Hx     Allergies Neg Hx     Angioedema Neg Hx     Asthma Neg Hx     Atopy Neg Hx     Eczema Neg Hx     Immunodeficiency Neg Hx      Rhinitis Neg Hx     Urticaria Neg Hx      Social History     Tobacco Use    Smoking status: Never Smoker    Smokeless tobacco: Never Used   Substance Use Topics    Alcohol use: No    Drug use: No       Review of patient's allergies indicates:   Allergen Reactions    Ace inhibitors Other (See Comments)     Cough    Arb-angiotensin receptor antagonist Itching    Eplerenone Other (See Comments)     Marked bradycardia, 40, tiredness and weakness      Sulfa (sulfonamide antibiotics) Itching     Patient says this was 10 years ago and doesn't remember what happened       Outpatient meds:  No current facility-administered medications on file prior to encounter.      Current Outpatient Medications on File Prior to Encounter   Medication Sig Dispense Refill    albuterol 90 mcg/actuation inhaler 2 puffs every 4 hours as needed for cough, wheeze, or shortness of breath 3 Inhaler 3    albuterol-ipratropium (DUO-NEB) 2.5 mg-0.5 mg/3 mL nebulizer solution INHALE 1 VIAL BY NEBULIZER EVERY 6 HOURS AS NEEDED FOR WHEEZING 270 mL 0    allopurinol (ZYLOPRIM) 100 MG tablet Take 1 tablet (100 mg total) by mouth once daily. 90 tablet 1    amLODIPine (NORVASC) 10 MG tablet Take 1 tablet (10 mg total) by mouth every evening. 90 tablet 1    aspirin (ECOTRIN) 81 MG EC tablet Take 81 mg by mouth once daily.        atorvastatin (LIPITOR) 80 MG tablet TAKE 1 TABLET (80 MG TOTAL) BY MOUTH ONCE DAILY. 90 tablet 3    carvedilol (COREG) 6.25 MG tablet Take 1 tablet (6.25 mg total) by mouth 2 (two) times daily with meals. 60 tablet 3    finasteride (PROSCAR) 5 mg tablet TAKE 1 TABLET ONCE DAILY. 90 tablet 3    fluticasone (FLONASE) 50 mcg/actuation nasal spray 2 sprays (100 mcg total) by Each Nare route once daily. (Patient taking differently: 2 sprays by Each Nare route daily as needed. ) 3 Bottle 3    gabapentin (NEURONTIN) 300 MG capsule Take 1 capsule (300 mg total) by mouth every evening. 90 capsule 3    isosorbide mononitrate  (ISMO,MONOKET) 10 mg tablet TAKE 1 TABLET BY MOUTH EVERY DAY IN THE EVENING 30 tablet 3    meclizine (ANTIVERT) 25 mg tablet TAKE 1 TABLET (25 MG TOTAL) BY MOUTH 3 (THREE) TIMES DAILY AS NEEDED. 90 tablet 0    sodium chloride (SALINE NASAL MIST) 3 % Mist 1 spray by Nasal route 2 (two) times daily.      tamsulosin (FLOMAX) 0.4 mg Cap Take 1 capsule (0.4 mg total) by mouth once daily. 30 capsule 2    traMADol (ULTRAM) 50 mg tablet Take 1 tablet (50 mg total) by mouth every 12 (twelve) hours as needed for Pain. 60 tablet 2    ciprofloxacin HCl (CIPRO) 500 MG tablet Take 1 tablet (500 mg total) by mouth 2 (two) times daily. for 7 days 14 tablet 0    COLCRYS 0.6 mg tablet TAKE 1 TABLET (0.6 MG TOTAL) BY MOUTH ONCE DAILY. 30 tablet 1    cyclobenzaprine (FLEXERIL) 5 MG tablet One tab po with breakfast and lunch take 2 tabs po before bedtime for up to 7 days 30 tablet 0    iron polysaccharides (NIFEREX) 150 mg iron Cap Take 1 capsule (150 mg total) by mouth 2 (two) times daily before meals. 60 capsule 11    losartan (COZAAR) 100 MG tablet Take 1 tablet (100 mg total) by mouth once daily. 90 tablet 0    mirtazapine (REMERON) 7.5 MG Tab Take 1 tablet (7.5 mg total) by mouth every evening. 30 tablet 2    montelukast (SINGULAIR) 10 mg tablet Take 1 tablet (10 mg total) by mouth every evening. 90 tablet 1    NITROSTAT 0.4 mg SL tablet PLACE 1 TABLET (0.4 MG TOTAL) UNDER THE TONGUE EVERY 5 (FIVE) MINUTES AS NEEDED FOR CHEST PAIN. 25 tablet 3    omeprazole (PRILOSEC) 20 MG capsule Take 1 capsule (20 mg total) by mouth once daily. 90 capsule 1    polyethylene glycol (GLYCOLAX) 17 gram/dose powder Take 17 g by mouth 2 (two) times daily before meals. 1 Bottle 4       Scheduled meds:   allopurinol  100 mg Oral Daily    amLODIPine  10 mg Oral QHS    aspirin  81 mg Oral Daily    atorvastatin  80 mg Oral Daily    carvedilol  6.25 mg Oral BID WM    ferrous sulfate  325 mg Oral BID    finasteride  5 mg Oral Daily     fluticasone  2 spray Each Nare Daily    gabapentin  300 mg Oral QHS    isosorbide mononitrate  10 mg Oral QHS    losartan  100 mg Oral Daily    mirtazapine  7.5 mg Oral QHS    montelukast  10 mg Oral QHS    pantoprazole  40 mg Oral Daily    piperacillin-tazobactam (ZOSYN) IVPB  2.25 g Intravenous Q8H    predniSONE  20 mg Oral BID    sodium bicarbonate  650 mg Oral TID    tamsulosin  0.4 mg Oral Daily    vitamin renal formula (B-complex-vitamin c-folic acid)  1 capsule Oral Daily       Infusions:      PRN meds:  acetaminophen, albuterol-ipratropium, dextrose 50%, dextrose 50%, docusate sodium, glucagon (human recombinant), glucose, glucose, ondansetron, sodium chloride 0.9%, traMADol, zolpidem    Review of Systems:  Review of Systems   Constitutional: Negative for chills, fever, malaise/fatigue and weight loss.   HENT: Negative for hearing loss and nosebleeds.    Eyes: Negative for blurred vision, double vision and photophobia.   Respiratory: Positive for shortness of breath. Negative for cough and wheezing.    Cardiovascular: Negative for chest pain, palpitations and leg swelling.   Gastrointestinal: Negative for abdominal pain, constipation, diarrhea, heartburn, nausea and vomiting.   Genitourinary: Negative for dysuria, frequency and urgency.   Musculoskeletal: Negative for falls, joint pain and myalgias.   Skin: Negative for itching and rash.   Neurological: Negative for dizziness, speech change, focal weakness, loss of consciousness and headaches.   Endo/Heme/Allergies: Does not bruise/bleed easily.   Psychiatric/Behavioral: Negative for depression and substance abuse. The patient is not nervous/anxious.        OBJECTIVE:     Vital Signs and IO (Last 24H):  Temp:  [96.7 °F (35.9 °C)-97.8 °F (36.6 °C)]   Pulse:  [57-79]   Resp:  [16-20]   BP: (119-141)/(62-72)   SpO2:  [92 %-97 %]   I/O last 3 completed shifts:  In: 1880 [P.O.:1530; IV Piggyback:350]  Out: 1300 [Urine:1300]    Wt Readings from Last 5  Encounters:   11/07/18 89.2 kg (196 lb 11.2 oz)   11/05/18 98.4 kg (216 lb 14.9 oz)   11/01/18 95.8 kg (211 lb 3.2 oz)   10/31/18 95.9 kg (211 lb 6.7 oz)   10/29/18 95.7 kg (211 lb)         Physical Exam:  Physical Exam   Constitutional: He is oriented to person, place, and time. He appears well-developed and well-nourished.   HENT:   Head: Normocephalic and atraumatic.   Mouth/Throat: Oropharynx is clear and moist.   Eyes: EOM are normal. Pupils are equal, round, and reactive to light. No scleral icterus.   Neck: Neck supple.   Cardiovascular: Normal rate and regular rhythm.   Pulmonary/Chest: Effort normal. No stridor. No respiratory distress. He has wheezes.   Abdominal: Soft. He exhibits no distension.   Musculoskeletal: Normal range of motion. He exhibits no edema or deformity.   Neurological: He is alert and oriented to person, place, and time. No cranial nerve deficit.   Skin: Skin is warm and dry. No rash noted. He is not diaphoretic. No erythema.   Psychiatric: He has a normal mood and affect. His behavior is normal.       Body mass index is 25.95 kg/m².    Laboratory:  Recent Labs   Lab 11/05/18 2216 11/06/18 0418 11/07/18 0415   * 135* 134*   K 4.2 3.9 4.1    109 110   CO2 14* 18* 15*   BUN 81* 77* 75*   CREATININE 5.5* 5.5* 5.5*   ESTGFRAFRICA 10* 10* 10*   EGFRNONAA 9* 9* 9*   CALCIUM 7.9* 7.7* 7.5*   ALBUMIN 3.5 3.2*  --    PHOS  --  3.7  --        Recent Labs   Lab 11/05/18 2216 11/06/18 0418 11/07/18 0415   WBC 10.30 9.20 5.70   HGB 9.6* 9.0* 8.0*   HCT 28.8* 26.7* 23.7*   * 126* 115*   MCV 91 91 91   MCHC 33.4 33.6 33.7   MONO 5.9  0.6 6.0  0.5 10.3  0.6       Recent Labs   Lab 11/05/18  2216 11/06/18  0418   ALKPHOS 78 71   BILITOT 0.3 0.4   PROT 6.5 5.9*   ALBUMIN 3.5 3.2*   ALT 35 32   AST 23 16       ASSESSMENT/PLAN:     Active Hospital Problems    Diagnosis  POA    *HCAP (healthcare-associated pneumonia) [J18.9]  Yes    Mild asthma with acute exacerbation [J45.901]   Unknown    Hemoptysis [R04.2]  Yes    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]  Yes    Hyponatremia [E87.1]  Yes    Hypocalcemia [E83.51]  Yes    Mild malnutrition [E44.1]  Yes    Debility [R53.81]  Yes    Chronic kidney disease, stage 5 [N18.5]  Yes    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, not on chronic dialysis [N18.5, D63.1]  Yes    Thrombocytopenia [D69.6]  Yes     Chronic      Benign prostatic hyperplasia without lower urinary tract symptoms [N40.0]  Yes    Gout, arthritis [M10.9]  Yes    CAD (coronary artery disease), 2V CABG 2007 [I25.10]  Yes    Essential hypertension [I10]  Yes    Hyperlipidemia, baseline  [E78.5]  Yes      Resolved Hospital Problems   No resolved problems to display.     CKD stage 5  Acidosis, metabolic  Uremia  Volume overload  No NSAIDs, ACEI/ARB, IV contrast or other nephrotoxins.  Keep MAP > 60, SBP > 100.  Dose meds for GFR < 30 ml/min.  Renal diet - low K, low phos.  No need for urgent HD at this time, although he probably will feel better. He does not want it.  Will give lasix 60 IV x 1 in effort to improve SOB, monitor UO for 24h.  Dr. Rojo to decide on upcoming outpatient follow-up if he needs diuretics or start HD.    Anemia of CKD  Stable. Monitor.  No need for NIC.  No need for IV iron.    MBD / Secondary HPT  Monitor phos levels. Low phos diet.    HTN  Controlled.   Tolerate asymptomatic HTN up to -160.  Continue home meds.  Low sodium diet.    BPH  Seems controlled, monitor for now.    Thank you for allowing us to participate in the care of your patient!   We will follow the patient and provide recommendations as needed.    Darrel Cary MD    [facility]  [address] [place] [address]  VANNA Desir [id number] [phone] - tel  [phone] - fax    11/7/2018 10:32 AM

## 2018-11-08 LAB
ALBUMIN SERPL BCP-MCNC: 3.3 G/DL
ANION GAP SERPL CALC-SCNC: 12 MMOL/L
BACTERIA SPEC AEROBE CULT: NORMAL
BASOPHILS # BLD AUTO: 0 K/UL
BASOPHILS NFR BLD: 0.2 %
BUN SERPL-MCNC: 72 MG/DL
CALCIUM SERPL-MCNC: 8.2 MG/DL
CHLORIDE SERPL-SCNC: 107 MMOL/L
CO2 SERPL-SCNC: 15 MMOL/L
CREAT SERPL-MCNC: 5.7 MG/DL
D DIMER PPP IA.FEU-MCNC: 0.65 MG/L FEU
DIFFERENTIAL METHOD: ABNORMAL
EOSINOPHIL # BLD AUTO: 0 K/UL
EOSINOPHIL NFR BLD: 0 %
ERYTHROCYTE [DISTWIDTH] IN BLOOD BY AUTOMATED COUNT: 15.8 %
EST. GFR  (AFRICAN AMERICAN): 10 ML/MIN/1.73 M^2
EST. GFR  (NON AFRICAN AMERICAN): 9 ML/MIN/1.73 M^2
GLUCOSE SERPL-MCNC: 187 MG/DL
GRAM STN SPEC: NORMAL
GRAM STN SPEC: NORMAL
HCT VFR BLD AUTO: 27.1 %
HGB BLD-MCNC: 9.1 G/DL
LYMPHOCYTES # BLD AUTO: 0.7 K/UL
LYMPHOCYTES NFR BLD: 8.3 %
MAGNESIUM SERPL-MCNC: 2.2 MG/DL
MCH RBC QN AUTO: 30.6 PG
MCHC RBC AUTO-ENTMCNC: 33.7 G/DL
MCV RBC AUTO: 91 FL
MONOCYTES # BLD AUTO: 0.3 K/UL
MONOCYTES NFR BLD: 4.2 %
NEUTROPHILS # BLD AUTO: 7.2 K/UL
NEUTROPHILS NFR BLD: 87.3 %
PHOSPHATE SERPL-MCNC: 3.3 MG/DL
PLATELET # BLD AUTO: 125 K/UL
PMV BLD AUTO: 9.1 FL
POTASSIUM SERPL-SCNC: 4.3 MMOL/L
RBC # BLD AUTO: 2.99 M/UL
SODIUM SERPL-SCNC: 134 MMOL/L
TROPONIN I SERPL DL<=0.01 NG/ML-MCNC: 0.02 NG/ML
VANCOMYCIN SERPL-MCNC: 17.4 UG/ML
WBC # BLD AUTO: 8.2 K/UL

## 2018-11-08 PROCEDURE — 63600175 PHARM REV CODE 636 W HCPCS: Performed by: INTERNAL MEDICINE

## 2018-11-08 PROCEDURE — 99223 1ST HOSP IP/OBS HIGH 75: CPT | Mod: ,,, | Performed by: INTERNAL MEDICINE

## 2018-11-08 PROCEDURE — 87070 CULTURE OTHR SPECIMN AEROBIC: CPT

## 2018-11-08 PROCEDURE — 63600175 PHARM REV CODE 636 W HCPCS: Performed by: NURSE PRACTITIONER

## 2018-11-08 PROCEDURE — 12000002 HC ACUTE/MED SURGE SEMI-PRIVATE ROOM

## 2018-11-08 PROCEDURE — 99232 SBSQ HOSP IP/OBS MODERATE 35: CPT | Mod: ,,, | Performed by: INTERNAL MEDICINE

## 2018-11-08 PROCEDURE — 25000003 PHARM REV CODE 250: Performed by: INTERNAL MEDICINE

## 2018-11-08 PROCEDURE — 85025 COMPLETE CBC W/AUTO DIFF WBC: CPT

## 2018-11-08 PROCEDURE — 25000003 PHARM REV CODE 250: Performed by: NURSE PRACTITIONER

## 2018-11-08 PROCEDURE — 63600175 PHARM REV CODE 636 W HCPCS: Performed by: HOSPITALIST

## 2018-11-08 PROCEDURE — 25000003 PHARM REV CODE 250: Performed by: HOSPITALIST

## 2018-11-08 PROCEDURE — 83735 ASSAY OF MAGNESIUM: CPT

## 2018-11-08 PROCEDURE — 36415 COLL VENOUS BLD VENIPUNCTURE: CPT

## 2018-11-08 PROCEDURE — 85379 FIBRIN DEGRADATION QUANT: CPT

## 2018-11-08 PROCEDURE — 80202 ASSAY OF VANCOMYCIN: CPT

## 2018-11-08 PROCEDURE — 94640 AIRWAY INHALATION TREATMENT: CPT

## 2018-11-08 PROCEDURE — 87205 SMEAR GRAM STAIN: CPT

## 2018-11-08 PROCEDURE — 25000242 PHARM REV CODE 250 ALT 637 W/ HCPCS: Performed by: NURSE PRACTITIONER

## 2018-11-08 PROCEDURE — 25000242 PHARM REV CODE 250 ALT 637 W/ HCPCS: Performed by: HOSPITALIST

## 2018-11-08 PROCEDURE — 94761 N-INVAS EAR/PLS OXIMETRY MLT: CPT

## 2018-11-08 PROCEDURE — 84484 ASSAY OF TROPONIN QUANT: CPT

## 2018-11-08 PROCEDURE — 93010 ELECTROCARDIOGRAM REPORT: CPT | Mod: ,,, | Performed by: INTERNAL MEDICINE

## 2018-11-08 PROCEDURE — 80069 RENAL FUNCTION PANEL: CPT

## 2018-11-08 PROCEDURE — 93005 ELECTROCARDIOGRAM TRACING: CPT

## 2018-11-08 RX ORDER — HEPARIN SODIUM 5000 [USP'U]/ML
5000 INJECTION, SOLUTION INTRAVENOUS; SUBCUTANEOUS EVERY 12 HOURS
Status: DISCONTINUED | OUTPATIENT
Start: 2018-11-08 | End: 2018-11-09 | Stop reason: HOSPADM

## 2018-11-08 RX ORDER — POLYETHYLENE GLYCOL 3350 17 G/17G
17 POWDER, FOR SOLUTION ORAL 2 TIMES DAILY PRN
Status: DISCONTINUED | OUTPATIENT
Start: 2018-11-08 | End: 2018-11-09 | Stop reason: HOSPADM

## 2018-11-08 RX ORDER — FUROSEMIDE 10 MG/ML
40 INJECTION INTRAMUSCULAR; INTRAVENOUS ONCE
Status: COMPLETED | OUTPATIENT
Start: 2018-11-08 | End: 2018-11-08

## 2018-11-08 RX ORDER — MORPHINE SULFATE 4 MG/ML
2 INJECTION, SOLUTION INTRAMUSCULAR; INTRAVENOUS ONCE
Status: COMPLETED | OUTPATIENT
Start: 2018-11-08 | End: 2018-11-08

## 2018-11-08 RX ORDER — NITROGLYCERIN 0.4 MG/1
0.4 TABLET SUBLINGUAL EVERY 5 MIN PRN
Status: DISCONTINUED | OUTPATIENT
Start: 2018-11-08 | End: 2018-11-09 | Stop reason: HOSPADM

## 2018-11-08 RX ORDER — IPRATROPIUM BROMIDE AND ALBUTEROL SULFATE 2.5; .5 MG/3ML; MG/3ML
3 SOLUTION RESPIRATORY (INHALATION) EVERY 6 HOURS
Status: DISCONTINUED | OUTPATIENT
Start: 2018-11-08 | End: 2018-11-09 | Stop reason: HOSPADM

## 2018-11-08 RX ORDER — AMOXICILLIN 250 MG
1 CAPSULE ORAL 2 TIMES DAILY
Status: DISCONTINUED | OUTPATIENT
Start: 2018-11-08 | End: 2018-11-09 | Stop reason: HOSPADM

## 2018-11-08 RX ADMIN — TRAMADOL HYDROCHLORIDE 50 MG: 50 TABLET, FILM COATED ORAL at 03:11

## 2018-11-08 RX ADMIN — Medication 1 CAPSULE: at 09:11

## 2018-11-08 RX ADMIN — MORPHINE SULFATE 2 MG: 4 INJECTION INTRAVENOUS at 08:11

## 2018-11-08 RX ADMIN — GABAPENTIN 300 MG: 300 CAPSULE ORAL at 08:11

## 2018-11-08 RX ADMIN — CARVEDILOL 6.25 MG: 6.25 TABLET, FILM COATED ORAL at 05:11

## 2018-11-08 RX ADMIN — MIRTAZAPINE 7.5 MG: 7.5 TABLET ORAL at 08:11

## 2018-11-08 RX ADMIN — AMLODIPINE BESYLATE 10 MG: 5 TABLET ORAL at 08:11

## 2018-11-08 RX ADMIN — MONTELUKAST SODIUM 10 MG: 10 TABLET, COATED ORAL at 08:11

## 2018-11-08 RX ADMIN — STANDARDIZED SENNA CONCENTRATE AND DOCUSATE SODIUM 1 TABLET: 8.6; 5 TABLET, FILM COATED ORAL at 09:11

## 2018-11-08 RX ADMIN — VANCOMYCIN HYDROCHLORIDE 1250 MG: 1 INJECTION, POWDER, LYOPHILIZED, FOR SOLUTION INTRAVENOUS at 05:11

## 2018-11-08 RX ADMIN — IPRATROPIUM BROMIDE AND ALBUTEROL SULFATE 3 ML: .5; 3 SOLUTION RESPIRATORY (INHALATION) at 01:11

## 2018-11-08 RX ADMIN — HEPARIN SODIUM 5000 UNITS: 5000 INJECTION, SOLUTION INTRAVENOUS; SUBCUTANEOUS at 08:11

## 2018-11-08 RX ADMIN — ALLOPURINOL 100 MG: 100 TABLET ORAL at 09:11

## 2018-11-08 RX ADMIN — HEPARIN SODIUM 5000 UNITS: 5000 INJECTION, SOLUTION INTRAVENOUS; SUBCUTANEOUS at 01:11

## 2018-11-08 RX ADMIN — FINASTERIDE 5 MG: 5 TABLET, FILM COATED ORAL at 09:11

## 2018-11-08 RX ADMIN — DOCUSATE SODIUM 100 MG: 100 CAPSULE, LIQUID FILLED ORAL at 08:11

## 2018-11-08 RX ADMIN — PIPERACILLIN AND TAZOBACTAM 2.25 G: 2; .25 INJECTION, POWDER, LYOPHILIZED, FOR SOLUTION INTRAVENOUS; PARENTERAL at 05:11

## 2018-11-08 RX ADMIN — ASPIRIN 81 MG: 81 TABLET, COATED ORAL at 09:11

## 2018-11-08 RX ADMIN — IPRATROPIUM BROMIDE AND ALBUTEROL SULFATE 3 ML: .5; 3 SOLUTION RESPIRATORY (INHALATION) at 08:11

## 2018-11-08 RX ADMIN — PIPERACILLIN AND TAZOBACTAM 2.25 G: 2; .25 INJECTION, POWDER, LYOPHILIZED, FOR SOLUTION INTRAVENOUS; PARENTERAL at 09:11

## 2018-11-08 RX ADMIN — ATORVASTATIN CALCIUM 80 MG: 40 TABLET, FILM COATED ORAL at 09:11

## 2018-11-08 RX ADMIN — FERROUS SULFATE TAB EC 325 MG (65 MG FE EQUIVALENT) 325 MG: 325 (65 FE) TABLET DELAYED RESPONSE at 08:11

## 2018-11-08 RX ADMIN — SODIUM BICARBONATE 650 MG TABLET 650 MG: at 08:11

## 2018-11-08 RX ADMIN — LOSARTAN POTASSIUM 100 MG: 25 TABLET ORAL at 09:11

## 2018-11-08 RX ADMIN — FUROSEMIDE 40 MG: 10 INJECTION, SOLUTION INTRAVENOUS at 03:11

## 2018-11-08 RX ADMIN — FERROUS SULFATE TAB EC 325 MG (65 MG FE EQUIVALENT) 325 MG: 325 (65 FE) TABLET DELAYED RESPONSE at 09:11

## 2018-11-08 RX ADMIN — SODIUM BICARBONATE 650 MG TABLET 650 MG: at 09:11

## 2018-11-08 RX ADMIN — FLUTICASONE PROPIONATE 100 MCG: 50 SPRAY, METERED NASAL at 09:11

## 2018-11-08 RX ADMIN — TAMSULOSIN HYDROCHLORIDE 0.4 MG: 0.4 CAPSULE ORAL at 09:11

## 2018-11-08 RX ADMIN — IPRATROPIUM BROMIDE AND ALBUTEROL SULFATE 3 ML: .5; 3 SOLUTION RESPIRATORY (INHALATION) at 07:11

## 2018-11-08 RX ADMIN — CARVEDILOL 6.25 MG: 6.25 TABLET, FILM COATED ORAL at 08:11

## 2018-11-08 RX ADMIN — PANTOPRAZOLE SODIUM 40 MG: 40 TABLET, DELAYED RELEASE ORAL at 09:11

## 2018-11-08 RX ADMIN — PIPERACILLIN AND TAZOBACTAM 2.25 G: 2; .25 INJECTION, POWDER, LYOPHILIZED, FOR SOLUTION INTRAVENOUS; PARENTERAL at 12:11

## 2018-11-08 RX ADMIN — SODIUM BICARBONATE 650 MG TABLET 650 MG: at 03:11

## 2018-11-08 RX ADMIN — ISOSORBIDE MONONITRATE 10 MG: 10 TABLET ORAL at 09:11

## 2018-11-08 RX ADMIN — ACETAMINOPHEN 1000 MG: 500 TABLET, FILM COATED ORAL at 03:11

## 2018-11-08 NOTE — NURSING
Telephoned Dr. Herndon as patient c/o chest pain and chest burning.  He had a 3 second run of V Tach.  New orders to follow.  Bell Wilkinson NP also notified

## 2018-11-08 NOTE — PROGRESS NOTES
11/08/18 0743   Patient Assessment/Suction   Level of Consciousness (AVPU) alert   All Lung Fields Breath Sounds coarse;diminished   PRE-TX-O2-ETCO2   O2 Device (Oxygen Therapy) room air   SpO2 95 %   Pulse Oximetry Type Intermittent   $ Pulse Oximetry - Multiple Charge Pulse Oximetry - Multiple   Pulse 79   Resp 18   Aerosol Therapy   $ Aerosol Therapy Charges Aerosol Treatment   Respiratory Treatment Status given   SVN/Inhaler Treatment Route mask   Position During Treatment HOB at 45 degrees   Patient Tolerance good   Post-Treatment   Post-treatment Heart Rate (beats/min) 80   Post-treatment Resp Rate (breaths/min) 18   All Fields Breath Sounds unchanged   Duo Q6PRN, tx given am, tolerated well, vitals as charted.

## 2018-11-08 NOTE — PROGRESS NOTES
Progress Note  PULMONARY    Admit Date: 11/5/2018 11/08/2018      SUBJECTIVE:     Nov 7, sl wheezes, feels better, ask for tramadol tid (renal failure max dose 200/d),  Follows dr Bishop,    Nov 8- feels better but had small amount brb hemoptysis with some chest pain this am. Smaller amount than 2 prior episodes.        PFSH and Allergies reviewed.    OBJECTIVE:     Vitals (Most recent):  Vitals:    11/08/18 1610   BP: (!) 152/72   Pulse: 69   Resp: 17   Temp: 97.4 °F (36.3 °C)       Vitals (24 hour range):  Temp:  [96.5 °F (35.8 °C)-98.2 °F (36.8 °C)]   Pulse:  [62-88]   Resp:  [16-20]   BP: (136-161)/(61-77)   SpO2:  [95 %-98 %]       Intake/Output Summary (Last 24 hours) at 11/8/2018 1646  Last data filed at 11/8/2018 0600  Gross per 24 hour   Intake 300 ml   Output 600 ml   Net -300 ml          Physical Exam:  The patient's neuro status (alertness,orientation,cognitive function,motor skills,), pharyngeal exam (oral lesions, hygiene, abn dentition,), Neck (jvd,mass,thyroid,nodes in neck and above/below clavicle),RESPIRATORY(symmetry,effort,fremitus,percussion,auscultation),  Cor(rhythm,heart tones including gallops,perfusion,edema)ABD(distention,hepatic&splenomegaly,tenderness,masses), Skin(rash,cyanosis),Psyc(affect,judgement,).  Exam negative except for these pertinent findings:    Alert, sl bilat wheezes type expiration- minimal.     Radiographs reviewed: view by direct vision  11/8 except for known left lower abn- cxr good  Results for orders placed during the hospital encounter of 02/25/18   X-Ray Chest 1 View    Narrative Comparison: 12/29/17    Technique: Single AP portable chest radiograph.    Findings:Stable cardiomegaly and aortic tortuosity noted. Sternotomy changes are noted consistent with prior CABG. The lungs are clear. No pleural abnormality or pneumothorax is seen. No radiographically apparent pulmonary nodule. Cholecystectomy clips. Degenerative changes in the shoulders.    Impression 1.   Stable cardiomegaly and aortic tortuosity. Changes of prior CABG. No acute radiographic findings.        Electronically signed by: Shun Pacheco MD  Date:     02/25/18  Time:    12:49    ]    Labs     Recent Labs   Lab 11/08/18  0926   WBC 8.20   HGB 9.1*   HCT 27.1*   *     Recent Labs   Lab 11/08/18  0819 11/08/18  0926   NA  --  134*   K  --  4.3   CL  --  107   CO2  --  15*   BUN  --  72*   CREATININE  --  5.7*   GLU  --  187*   CALCIUM  --  8.2*   MG 2.2  --    PHOS  --  3.3   ALBUMIN  --  3.3*   TROPONINI 0.023  --    No results for input(s): PH, PCO2, PO2, HCO3 in the last 24 hours.  Microbiology Results (last 7 days)     Procedure Component Value Units Date/Time    Culture, Respiratory with Gram Stain [[id number]] Collected:  11/08/18 1551    Order Status:  Sent Specimen:  Respiratory from Sputum, Expectorated Updated:  11/08/18 1552    Culture, Respiratory with Gram Stain [[id number]] Collected:  11/07/18 1924    Order Status:  Completed Specimen:  Respiratory from Sputum, Expectorated Updated:  11/08/18 0047     Respiratory Culture Specimen inadequate - culture not performed     Gram Stain (Respiratory) >10epis/lfp and <than many WBC's     Gram Stain (Respiratory) Predominance of oropharyngeal carolee. Please recollect.    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]  Yes    Hyponatremia [E87.1]  Yes    Hypocalcemia [E83.51]  Yes    Mild malnutrition [E44.1]  Yes    Debility [R53.81]  Yes    Chronic kidney disease, stage 5 [N18.5]  Yes    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, not on chronic  dialysis [N18.5, D63.1]  Yes    Thrombocytopenia [D69.6]  Yes     Chronic      Benign prostatic hyperplasia without lower urinary tract symptoms [N40.0]  Yes    Angina effort [I20.8]  No     Chronic    Gout, arthritis [M10.9]  Yes    CAD (coronary artery disease), 2V CABG 2007 [I25.10]  Yes    Essential hypertension [I10]  Yes    Hyperlipidemia, baseline  [E78.5]  Yes      Resolved Hospital Problems   No resolved problems to display.     Results for ARTEM WEI (MRN [id number]) as of 11/7/2018 08:09   Ref. Range 11/5/2018 22:16 11/6/2018 04:18 11/7/2018 04:15   WBC Latest Ref Range: 3.90 - 12.70 K/uL 10.30 9.20 5.70             Plan:   Nov 7, sl wheezes, feels better, ask for tramadol tid (renal failure max dose 200/d),  Follows dr Bishop,  Pt ambulating and seems to be doing well.    Proteinuria noted, non gap acidosis likely renal- bicarb reasonable.  There is no pulmonary artery aneursym.      Pt was on oral cipro sice 10/ bid with renal failure and apparently  Failed. There are no blood cultures resulted on epic at this time?  No sputum submitted?   Order bicarb and dose prednisone x2.      Best to monitor another day as failed outpt abx?      Addendum- pt says did not take cipro for fear side effects.      Nov 8- ct reviewed as was today's cxr.  Pt is being rx for abx after presenting with fever and chill and hemoptysis - ct had airways open and lll infiltrate.  Pt had some brb hemoptysis today.    cta would be a problem with renal status. Will check d dimer and u/s legs and monitor.  Airways ok on ct -  Dx not clear.  Pt is on low dose steroid.  There is no concern re pulm art aneurysm.                            .

## 2018-11-08 NOTE — ASSESSMENT & PLAN NOTE
Chronic problem. Stage 5 - non-oliguric, not on HD.  Signs of uremia. Creatinine at baseline.  Continue to monitor BUN/SCr.  Monitor electrolytes and I/O's.  Avoid nonessential nephrotoxins.  Renal dose medications for Estimated Creatinine Clearance: 11.9 mL/min (A) (based on SCr of 5.7 mg/dL (H)).   Consult nephrology.

## 2018-11-08 NOTE — CONSULTS
Nephrology Consult Progress Note        Patient Name: Micheal Hunt  MRN: [id number]    Patient Class: IP- Inpatient   Admission Date: 11/5/2018  Length of Stay: 2 days  Date of Service: 11/8/2018    Attending Physician: Raymundo Ball MD  Primary Care Provider: Pk Lakhani MD    Reason for Consult: ckd4, anemia, htn, shpt    SUBJECTIVE:     HPI: 78M with CKD stage 5 and working fistula, followed by Dr. Rojo, HTN, BPH, was admitted with PNA symptoms and treated with IV abx, seen by Pulm as well. He was in recently with weakness and feeling unwell, did not start HD at that time. Labs show stable renal function at this time, he feels better.    11/8 VSS , better after IV diuretics yesterday. Spoke with patient, daughter, primary team. He will think tonight about initiating HD and let me know of his decision in AM.    Scheduled meds:   albuterol-ipratropium  3 mL Nebulization Q6H    allopurinol  100 mg Oral Daily    amLODIPine  10 mg Oral QHS    aspirin  81 mg Oral Daily    atorvastatin  80 mg Oral Daily    carvedilol  6.25 mg Oral BID WM    ferrous sulfate  325 mg Oral BID    finasteride  5 mg Oral Daily    fluticasone  2 spray Each Nare Daily    gabapentin  300 mg Oral QHS    heparin (porcine)  5,000 Units Subcutaneous Q12H    isosorbide mononitrate  10 mg Oral QHS    losartan  100 mg Oral Daily    mirtazapine  7.5 mg Oral QHS    montelukast  10 mg Oral QHS    pantoprazole  40 mg Oral Daily    piperacillin-tazobactam (ZOSYN) IVPB  2.25 g Intravenous Q8H    sodium bicarbonate  650 mg Oral TID    tamsulosin  0.4 mg Oral Daily    vitamin renal formula (B-complex-vitamin c-folic acid)  1 capsule Oral Daily       Infusions:      PRN meds:  acetaminophen, dextrose 50%, dextrose 50%, docusate sodium, glucagon (human recombinant), glucose, glucose, nitroGLYCERIN, ondansetron, sodium chloride 0.9%, traMADol, zolpidem    Review of Systems:  ROS    OBJECTIVE:     Vital Signs and IO (Last  24H):  Temp:  [96.5 °F (35.8 °C)-98.2 °F (36.8 °C)]   Pulse:  [62-88]   Resp:  [16-20]   BP: (136-161)/(61-77)   SpO2:  [95 %-98 %]   I/O last 3 completed shifts:  In: 1420 [P.O.:1370; IV Piggyback:50]  Out: 2200 [Urine:2200]    Wt Readings from Last 5 Encounters:   11/07/18 89.2 kg (196 lb 11.2 oz)   11/05/18 98.4 kg (216 lb 14.9 oz)   11/01/18 95.8 kg (211 lb 3.2 oz)   10/31/18 95.9 kg (211 lb 6.7 oz)   10/29/18 95.7 kg (211 lb)         Physical Exam:  Physical Exam   Constitutional: He is oriented to person, place, and time. He appears well-developed and well-nourished.   HENT:   Head: Normocephalic and atraumatic.   Mouth/Throat: Oropharynx is clear and moist.   Eyes: EOM are normal. Pupils are equal, round, and reactive to light. No scleral icterus.   Neck: Neck supple.   Cardiovascular: Normal rate and regular rhythm.   Pulmonary/Chest: Effort normal. No stridor. No respiratory distress. He has wheezes.   Abdominal: Soft. He exhibits no distension.   Musculoskeletal: Normal range of motion. He exhibits no edema or deformity.   Neurological: He is alert and oriented to person, place, and time. No cranial nerve deficit.   Skin: Skin is warm and dry. No rash noted. He is not diaphoretic. No erythema.   Psychiatric: He has a normal mood and affect. His behavior is normal.       Body mass index is 25.95 kg/m².    Laboratory:  Recent Labs   Lab 11/05/18  2216 11/06/18  0418 11/07/18  0415 11/08/18  0926   * 135* 134* 134*   K 4.2 3.9 4.1 4.3    109 110 107   CO2 14* 18* 15* 15*   BUN 81* 77* 75* 72*   CREATININE 5.5* 5.5* 5.5* 5.7*   ESTGFRAFRICA 10* 10* 10* 10*   EGFRNONAA 9* 9* 9* 9*   CALCIUM 7.9* 7.7* 7.5* 8.2*   ALBUMIN 3.5 3.2*  --  3.3*   PHOS  --  3.7  --  3.3       Recent Labs   Lab 11/06/18  0418 11/07/18  0415 11/08/18  0926   WBC 9.20 5.70 8.20   HGB 9.0* 8.0* 9.1*   HCT 26.7* 23.7* 27.1*   * 115* 125*   MCV 91 91 91   MCHC 33.6 33.7 33.7   MONO 6.0  0.5 10.3  0.6 4.2  0.3        Recent Labs   Lab 11/05/18  2216 11/06/18  0418 11/08/18  0926   ALKPHOS 78 71  --    BILITOT 0.3 0.4  --    PROT 6.5 5.9*  --    ALBUMIN 3.5 3.2* 3.3*   ALT 35 32  --    AST 23 16  --        ASSESSMENT/PLAN:     Active Hospital Problems    Diagnosis  POA    *HCAP (healthcare-associated pneumonia) [J18.9]  Yes    Acute on chronic combined systolic and diastolic heart failure [I50.43]  Yes    Mild asthma with acute exacerbation [J45.901]  Yes    Hemoptysis [R04.2]  Yes    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]  Yes    Hyponatremia [E87.1]  Yes    Hypocalcemia [E83.51]  Yes    Mild malnutrition [E44.1]  Yes    Debility [R53.81]  Yes    Chronic kidney disease, stage 5 [N18.5]  Yes    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     CKD stage 5  Acidosis, metabolic  Uremia  Volume overload  No NSAIDs, ACEI/ARB, IV contrast or other nephrotoxins.  Keep MAP > 60, SBP > 100.  Dose meds for GFR < 30 ml/min.  Renal diet - low K, low phos.  No need for urgent HD at this time.    Anemia of CKD  Stable. Monitor.  No need for NIC.  No need for IV iron.    MBD / Secondary HPT  Monitor phos levels. Low phos diet.    HTN  Controlled.   Tolerate asymptomatic HTN up to -160.  Continue home meds.  Low sodium diet.    BPH  Seems controlled, monitor for now.    Thank you for allowing us to participate in the care of your patient!   We will follow the patient and provide recommendations as needed.    Darrel Cary MD    [facility]  [address] [place] [address]  [address]  [place] [address]    [phone] - tel  [phone] - fax    11/8/2018 10:32 AM

## 2018-11-08 NOTE — PROGRESS NOTES
11/07/18 1933   Patient Assessment/Suction   Level of Consciousness (AVPU) alert   PRE-TX-O2-ETCO2   O2 Device (Oxygen Therapy) room air   SpO2 98 %   Pulse Oximetry Type Intermittent   Aerosol Therapy   $ Aerosol Therapy Charges PRN treatment not required   Respiratory Treatment Status PRN treatment not required

## 2018-11-08 NOTE — CONSULTS
[facility]  Cardiology  Consult Note    Patient Name: Micheal Hunt  MRN: [id number]  Admission Date: 11/5/2018  Hospital Length of Stay: 2 days  Code Status: Full Code   Attending Provider:  Consulting Provider: ROSALBA Keller  Primary Care Physician: Pk Lakhani MD  Principal Problem:HCAP (healthcare-associated pneumonia)    Patient information was obtained from patient, past medical records and ER records.     Seen on 8/10/2018   PCP: Dr. Lakhani  Renal: Dr. Rojo, see every 6 weeks.  ENT: Dr. Nails, now Dr. Chahal in [place]  GI: Dr. Harris, Dr. Saleem  Physical medicine: Dr. Whitehead  Urology: Dr. Herring  Orthopedic: Dr. Fox in [place], [place], [phone],  Álvarez  Lives alone, daughter, Tonya, on the same ranch, 20 acre, 4 horses, 20 chicken, no  workers, prior commercial contractor, grandson, Uziel  Daughter, Crow, nutritionist in [facility] supportive of Mediterranean diet, now on plant-based diet, and a Yoga instruction.      Consults For cardiology to render an opinion in regard to CHF, CP, VT.    Subjective:     Chief Complaint:  Fever and SOB    HPI:  Mr Micheal Hunt is a 77 y/o male pt known to Dr Dunlap. He states that Oct 2 he fell off of a [place] and hit his nose and forehead and that is why he has 2 black eyes. He was in his usual state of health till last Saturday and began to develop fever and chills. Sunday he felt worse and developed a cough and sputum production that was blood tinged. He continue to have fever, cough and SOB so his daughter made him come to the ED. He was diagnosed with pneumonia.  Last night he developed CP substernal that was worse with inspiration and cough. EKG unremarkable and troponin negative.   Dr Dunlap reviewed both and also telemetry strips for possible V Tach looks like ectopy.  As far as CHF pt has no edema, xray and Ct of chest more consistent for pneumonia. But BNP elevated at 1,140. His renal function at stage 5.  May be going to get dialyzed today which will help.    Past Medical History:   Diagnosis Date    NICK (acute kidney injury) 7/29/2016    Allergy     Anemia, mild 12/15/2014    Arthritis     Gout    Benign essential HTN 3/27/2012    BMI 29.0-29.9,adult 5/10/2018    BPH (benign prostatic hyperplasia)     BPH (benign prostatic hyperplasia)     CAD (coronary artery disease) 2006    Chronic kidney disease     due to ibuprofen    Colon polyp     CRF (chronic renal failure), stage 5     Diverticulosis     Gastritis     GERD (gastroesophageal reflux disease)     Gout     History of colon polyps 5/3/2018    HTN (hypertension) 3/27/2012    Hyperlipidemia     Hyperlipidemia     LLL pneumonia 6/14/2018    LVH (left ventricular hypertrophy)     Mesenteric ischemia     Murmur, cardiac 3/27/2012    GRICELDA (obstructive sleep apnea)     DOES NOT USE A MACHINE    Sinus problem     Syncope and collapse        Past Surgical History:   Procedure Laterality Date    APPENDECTOMY      BLOCK-NERVE Left 5/31/2016    [facility] OR    CARDIAC CATHETERIZATION      COLONOSCOPY  2011    COLONOSCOPY N/A 5/3/2018    Procedure: COLONOSCOPY;  Surgeon: Messi Harris MD;  Location: [facility];  Service: Endoscopy;  Laterality: N/A;    COLONOSCOPY N/A 5/3/2018    [facility] at [facility]    COLONOSCOPY N/A 9/10/2015    [facility] at [facility]    CORONARY ARTERY BYPASS GRAFT  4/2007    x 1    CYSTOSCOPY N/A 8/30/2017    [facility] Rudy [facility]ring [facility] at [facility] OR    CYSTOSCOPY N/A 11/10/2015    [facility] at [facility] OR    ESOPHAGOGASTRODUODENOSCOPY (EGD) N/A 1/4/2016    Performed by Tra [facility] at [facility] ENDO (2ND FLR)    ESOPHAGOGASTRODUODENOSCOPY (EGD) N/A 10/15/2014    Performed by Messi Harris [facility] at [facility] ENDO    INJECTION-STEROID-EPIDURAL-TRANSFORAMINAL Left 2/25/2016    Per[facility] at [facility] OR     JOINT REPLACEMENT      left knee total replacement  X 3    mid leftt finger      from a cactuss    MOLE REMOVAL  2016    RADIOFREQUENCY THERMOCOAGULATION (RFTC)-NERVE-MEDIAN BRANCH-LUMBAR Left 4/11/2016    [facility]    rotative cuff      no rotative cuffs on bilat shoulders has pins     SHOULDER SURGERY      shoulder surgery bilat  RIGHT X 4; LEFT X 3    TRANSRECTAL ULTRASOUND GUIDED PROSTATE BIOPSY Bilateral 11/10/2015    [facility] at [facility] OR    ULTRASOUND-ENDOSCOPIC-UPPER N/A 5/31/2017    Performed by Tra [facility] at [facility] ENDO (2ND FLR)    ULTRASOUND-ENDOSCOPIC-UPPER N/A 1/4/2016    Performed by Tra [facility] at [facility] ENDO (2ND FLR)    ULTRASOUND-ENDOSCOPIC-UPPER N/A 1/16/2015    Performed by Jason Saleem MD at [facility] ENDO (2ND FLR)       Review of patient's allergies indicates:   Allergen Reactions    Ace inhibitors Other (See Comments)     Cough    Arb-angiotensin receptor antagonist Itching    Eplerenone Other (See Comments)     Marked bradycardia, 40, tiredness and weakness      Sulfa (sulfonamide antibiotics) Itching     Patient says this was 10 years ago and doesn't remember what happened       No current facility-administered medications on file prior to encounter.      Current Outpatient Medications on File Prior to Encounter   Medication Sig    albuterol 90 mcg/actuation inhaler 2 puffs every 4 hours as needed for cough, wheeze, or shortness of breath    albuterol-ipratropium (DUO-NEB) 2.5 mg-0.5 mg/3 mL nebulizer solution INHALE 1 VIAL BY NEBULIZER EVERY 6 HOURS AS NEEDED FOR WHEEZING    allopurinol (ZYLOPRIM) 100 MG tablet Take 1 tablet (100 mg total) by mouth once daily.    amLODIPine (NORVASC) 10 MG tablet Take 1 tablet (10 mg total) by mouth every evening.    aspirin (ECOTRIN) 81 MG EC tablet Take 81 mg by mouth once daily.      atorvastatin (LIPITOR) 80 MG tablet TAKE 1 TABLET (80 MG TOTAL) BY MOUTH ONCE DAILY.     carvedilol (COREG) 6.25 MG tablet Take 1 tablet (6.25 mg total) by mouth 2 (two) times daily with meals.    finasteride (PROSCAR) 5 mg tablet TAKE 1 TABLET ONCE DAILY.    fluticasone (FLONASE) 50 mcg/actuation nasal spray 2 sprays (100 mcg total) by Each Nare route once daily. (Patient taking differently: 2 sprays by Each Nare route daily as needed. )    gabapentin (NEURONTIN) 300 MG capsule Take 1 capsule (300 mg total) by mouth every evening.    isosorbide mononitrate (ISMO,MONOKET) 10 mg tablet TAKE 1 TABLET BY MOUTH EVERY DAY IN THE EVENING    meclizine (ANTIVERT) 25 mg tablet TAKE 1 TABLET (25 MG TOTAL) BY MOUTH 3 (THREE) TIMES DAILY AS NEEDED.    sodium chloride (SALINE NASAL MIST) 3 % Mist 1 spray by Nasal route 2 (two) times daily.    tamsulosin (FLOMAX) 0.4 mg Cap Take 1 capsule (0.4 mg total) by mouth once daily.    traMADol (ULTRAM) 50 mg tablet Take 1 tablet (50 mg total) by mouth every 12 (twelve) hours as needed for Pain.    [] ciprofloxacin HCl (CIPRO) 500 MG tablet Take 1 tablet (500 mg total) by mouth 2 (two) times daily. for 7 days    COLCRYS 0.6 mg tablet TAKE 1 TABLET (0.6 MG TOTAL) BY MOUTH ONCE DAILY.    cyclobenzaprine (FLEXERIL) 5 MG tablet One tab po with breakfast and lunch take 2 tabs po before bedtime for up to 7 days    iron polysaccharides (NIFEREX) 150 mg iron Cap Take 1 capsule (150 mg total) by mouth 2 (two) times daily before meals.    losartan (COZAAR) 100 MG tablet Take 1 tablet (100 mg total) by mouth once daily.    mirtazapine (REMERON) 7.5 MG Tab Take 1 tablet (7.5 mg total) by mouth every evening.    montelukast (SINGULAIR) 10 mg tablet Take 1 tablet (10 mg total) by mouth every evening.    NITROSTAT 0.4 mg SL tablet PLACE 1 TABLET (0.4 MG TOTAL) UNDER THE TONGUE EVERY 5 (FIVE) MINUTES AS NEEDED FOR CHEST PAIN.    omeprazole (PRILOSEC) 20 MG capsule Take 1 capsule (20 mg total) by mouth once daily.    polyethylene glycol (GLYCOLAX) 17 gram/dose  powder Take 17 g by mouth 2 (two) times daily before meals.     Family History     Problem Relation (Age of Onset)    Cancer Father    Heart disease Mother, Sister    Hypertension Brother    Pneumonia Sister    Stroke Sister    Sudden death Father        Tobacco Use    Smoking status: Never Smoker    Smokeless tobacco: Never Used   Substance and Sexual Activity    Alcohol use: No    Drug use: No    Sexual activity: Not on file     Review of Systems   Constitution: Positive for chills, fever and malaise/fatigue.   HENT: Negative.    Eyes: Negative.    Cardiovascular: Positive for chest pain.   Respiratory: Positive for cough, hemoptysis, shortness of breath and sputum production.    Skin: Negative.         2 black eyes   Musculoskeletal: Negative.    Gastrointestinal: Negative.    Genitourinary: Negative.    Neurological: Negative.    Psychiatric/Behavioral: Negative.         Objective:     Vital Signs (Most Recent):  Temp: 96.5 °F (35.8 °C) (11/08/18 0733)  Pulse: 79 (11/08/18 0743)  Resp: 18 (11/08/18 0743)  BP: (!) 140/77 (11/08/18 0733)  SpO2: 95 % (11/08/18 0743) Vital Signs (24h Range):  Temp:  [96.5 °F (35.8 °C)-98.5 °F (36.9 °C)] 96.5 °F (35.8 °C)  Pulse:  [62-80] 79  Resp:  [16-20] 18  SpO2:  [94 %-98 %] 95 %  BP: (136-148)/(61-77) 140/77     Weight: 89.2 kg (196 lb 11.2 oz)  Body mass index is 25.95 kg/m².    SpO2: 95 %  O2 Device (Oxygen Therapy): room air      Intake/Output Summary (Last 24 hours) at 11/8/2018 1003  Last data filed at 11/8/2018 0600  Gross per 24 hour   Intake 660 ml   Output 2200 ml   Net -1540 ml       Lines/Drains/Airways     Drain                 Hemodialysis AV Fistula Left upper arm -- days          Peripheral Intravenous Line                 Peripheral IV - Single Lumen 11/05/18 2235 Right Forearm 2 days                Physical Exam   Constitutional: He is oriented to person, place, and time. He appears well-developed and well-nourished.   HENT:   Head: Normocephalic.   Eyes:  Conjunctivae are normal. Pupils are equal, round, and reactive to light.   Noted bruising below both eyes   Neck: Normal range of motion. Neck supple. No thyromegaly present.   Cardiovascular: Normal rate and regular rhythm.   Murmur heard.  Pulmonary/Chest: Effort normal. No respiratory distress.   Diminished to the bases.   Abdominal: Soft. Bowel sounds are normal.   Musculoskeletal: Normal range of motion. He exhibits no edema.   Neurological: He is alert and oriented to person, place, and time.   Skin: Skin is warm and dry.   Psychiatric: He has a normal mood and affect. His behavior is normal. Thought content normal.      Significant Labs:   BMP:   Recent Labs   Lab 11/07/18 0415 11/08/18 0819     --    *  --    K 4.1  --      --    CO2 15*  --    BUN 75*  --    CREATININE 5.5*  --    CALCIUM 7.5*  --    MG  --  2.2   , CMP   Recent Labs   Lab 11/07/18 0415   *   K 4.1      CO2 15*      BUN 75*   CREATININE 5.5*   CALCIUM 7.5*   ANIONGAP 9   ESTGFRAFRICA 10*   EGFRNONAA 9*   , CBC   Recent Labs   Lab 11/07/18 0415   WBC 5.70   HGB 8.0*   HCT 23.7*   *    and Troponin   Recent Labs   Lab 11/08/18 0819   TROPONINI 0.023       Significant Imaging:  Echo:  The left ventricle cavity is mildly dilated.  Mild-to-moderate aortic regurgitation.  Left ventricle ejection fraction is low normal at 51%  Left ventricle shows mild concentric hypertrophy.  RV systolic function is normal.  Left atrium is moderately dilated.  Moderate aortic valve stenosis.  Aortic valve area is 1.30 cm2; peak velocity is 2.96 m/s; mean gradient is 21.48 mmHg.  Right atrium is mildly dilated.  Mild mitral regurgitation.  Normal central venous pressure (3 mm Hg).  The estimated PA systolic pressure is 27.21 mm Hg  No definite change from Echo in 6/2018    CXray:  Left lower lobe airspace disease which could reflect pneumonia or aspiration.    CT Chest:  1. Left lower lobe airspace disease  suggestive of pneumonia.  2. Additional small airspace opacity in the right upper lobe.  This could relate to the primary process, although has a more rounded contour and a nodule is possible.  Recommend 3 month follow-up CT for further characterization.  3.  Enlarged pulmonary arterial trunk which can be seen in the setting of pulmonary hypertension.  4. Ectasia of the ascending aorta.  5. Cardiomegaly and coronary artery disease.  This report was flagged in Epic as abnormal.     Assessment and Plan:     Pneumonia - agree with antibiotics, pulmonary consulted.    Hemoptysis - awaiting sputum culture and pulm on case    Chronic kidney disease stage 5 - defer to hospitalist service    Anemia - anemia likely secondary to renal impairment.    CAD - continue home medications.    Hyperlipidemia - continue statins.    HTN - stable, continue home medications and monitor.    CHF - elevated BNP, dialysis may help lower this level.    CP - atypical CP, normal troponin, EKG unremarkable. Pain worse with inspiration likely muscloskeletal.    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/06/2018 Yes    Hyponatremia [E87.1] 11/06/2018 Yes    Hypocalcemia [E83.51] 11/06/2018 Yes    Mild malnutrition [E44.1] 11/06/2018 Yes    Debility [R53.81] 11/06/2018 Yes    Chronic kidney disease, stage 5 [N18.5] 06/14/2018 Yes    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, not on chronic dialysis [N18.5, D63.1] 02/25/2018 Yes    Thrombocytopenia [D69.6] 02/25/2018 Yes    Benign prostatic hyperplasia without lower urinary tract symptoms [N40.0] 08/30/2017 Yes    Gout, arthritis [M10.9] 04/27/2012 Yes    CAD (coronary artery  disease), 2V CABG 2007 [I25.10]  Yes    Essential hypertension [I10] 03/27/2012 Yes    Hyperlipidemia, baseline  [E78.5] 03/27/2012 Yes      Problems Resolved During this Admission:       VTE Risk Mitigation (From admission, onward)        Ordered     IP VTE HIGH RISK PATIENT  Once      11/06/18 0206     Place LOYD hose  Until discontinued      11/06/18 0206     Place sequential compression device  Until discontinued      11/06/18 0206     Reason for No Pharmacological VTE Prophylaxis  Once      11/06/18 0206          Thank you for your consult.    Janine Umanzor, GAVIOTAP  Cardiology   [facility]

## 2018-11-08 NOTE — CONSULTS
Date: 11/8/2018   Micheal Hunt [id number] is a 78 y.o. male who has been consulted for vancomycin dosing.    The patient has the following labs:     Creatinine (mg/dl)  WBC Count  Serum creatinine: 5.5 mg/dL (H) 11/07/18 0415  Estimated creatinine clearance: 12.3 mL/min (A)  Lab Results  Component  Value  Date  WBC  5.70  11/07/2018    Current weight is 89.2 kg (196 lb 11.2 oz)      Pt's renal function is not stable.  Pharmacy will dose by level. Target goal is 15-20 mg/dL.   Vanc level 11/8/2018 at 0317 was 17.4 mg/dL.  Pharmacy will dose vancomycin 1250 mg x1.  A vancomycin level will be ordered on 11/09 at 0500.      Patient will be followed by pharmacy for changes in renal function, toxicity, and efficacy.    Thank you for allowing us to participate in this patient's care.     Alice Thornton, NahidD

## 2018-11-08 NOTE — PLAN OF CARE
Problem: Patient Care Overview  Goal: Plan of Care Review  Outcome: Ongoing (interventions implemented as appropriate)  Bed is in low position, call light is within reach, SR up x 2, no complaints of pain, Cardiac monitored SR, PRN sleep aid given, IV Abx tolerated,  no adverse events, patient is resting between care, will continue to monitor and round.  Main campus Lab called stating that sputum collected needs to be redone, was not conclusive, will inform on-coming nurse, random vanc trough done 17.4, will continue to monitor and round.

## 2018-11-08 NOTE — SUBJECTIVE & OBJECTIVE
"Interval History:  Still with productive cough- dark yellow/blood tinged. ARMAS a little better.   Reports midsternal chest pain this am "buring"   Known to Dr. Dunlap.       Review of Systems   Constitutional: Positive for fatigue. Negative for appetite change, chills and diaphoresis.   HENT: Positive for congestion and sore throat. Negative for hearing loss.    Respiratory: Positive for cough and shortness of breath. Negative for wheezing.    Cardiovascular: Positive for chest pain. Negative for palpitations and leg swelling.   Gastrointestinal: Positive for abdominal pain. Negative for blood in stool and nausea.   Musculoskeletal: Negative for arthralgias, back pain and myalgias.   Neurological: Negative for dizziness, syncope and light-headedness.   Psychiatric/Behavioral: Negative for agitation and confusion. The patient is not nervous/anxious.      Objective:     Vital Signs (Most Recent):  Temp: 97.5 °F (36.4 °C) (11/08/18 1139)  Pulse: 82 (11/08/18 1139)  Resp: 20 (11/08/18 1139)  BP: (!) 161/77 (11/08/18 1139)  SpO2: 95 % (11/08/18 1139) Vital Signs (24h Range):  Temp:  [96.5 °F (35.8 °C)-98.2 °F (36.8 °C)] 97.5 °F (36.4 °C)  Pulse:  [62-82] 82  Resp:  [16-20] 20  SpO2:  [95 %-98 %] 95 %  BP: (136-161)/(61-77) 161/77     Weight: 89.2 kg (196 lb 11.2 oz)  Body mass index is 25.95 kg/m².    Intake/Output Summary (Last 24 hours) at 11/8/2018 1247  Last data filed at 11/8/2018 0600  Gross per 24 hour   Intake 300 ml   Output 1200 ml   Net -900 ml      Physical Exam   Constitutional: He is oriented to person, place, and time. He appears well-developed and well-nourished.   HENT:   Head: Normocephalic and atraumatic.   Nose: Nose normal.   Mouth/Throat: Oropharynx is clear and moist.   Eyes: Conjunctivae and EOM are normal. Pupils are equal, round, and reactive to light.   Neck: Normal range of motion. Neck supple.   Cardiovascular: Normal rate, regular rhythm and intact distal pulses.   Murmur (ASM) " heard.  Pulmonary/Chest: Effort normal. He has no wheezes.   Coarse. No wheezes   Abdominal: Soft. Bowel sounds are normal. There is no tenderness.   Musculoskeletal: Normal range of motion.   Neurological: He is alert and oriented to person, place, and time.   Skin: Skin is warm and dry.   AVF to LUE +T/B   Psychiatric: He has a normal mood and affect. His behavior is normal.   Nursing note and vitals reviewed.      Significant Labs:   BMP:   Recent Labs   Lab 11/08/18  0819 11/08/18  0926   GLU  --  187*   NA  --  134*   K  --  4.3   CL  --  107   CO2  --  15*   BUN  --  72*   CREATININE  --  5.7*   CALCIUM  --  8.2*   MG 2.2  --      CBC:   Recent Labs   Lab 11/07/18  0415 11/08/18  0926   WBC 5.70 8.20   HGB 8.0* 9.1*   HCT 23.7* 27.1*   * 125*       Significant Imaging: I have reviewed and interpreted all pertinent imaging results/findings within the past 24 hours.

## 2018-11-08 NOTE — ASSESSMENT & PLAN NOTE
Chronic problem.  Stable.  Monitor H/H.  Continue iron supplementation.  Transfuse for hemodynamic instability and/or H/H <7/21  Lab Results   Component Value Date    HGB 9.1 (L) 11/08/2018

## 2018-11-08 NOTE — ASSESSMENT & PLAN NOTE
6/2018 EF 46% with DD. Repeat echo-- echo reviewed. EF 51%  Lasix 60 mg IV x 1 dose.  Accurate I/O  Check BNP--elevated.   Renal/Low Na diet  Consult cards

## 2018-11-08 NOTE — PROGRESS NOTES
"[facility] Hospital Medicine  Progress Note    Patient Name: Micheal Hunt  MRN: [id number]  Patient Class: IP- Inpatient   Admission Date: 11/5/2018  Length of Stay: 2 days  Attending Physician: Raymundo Ball MD  Primary Care Provider: Pk Lakhani MD        Subjective:     Principal Problem:HCAP (healthcare-associated pneumonia)    HPI:  Micheal Hunt is a 79 yo male with PMHx of CKD stage 5, CAD, HTN, BPH, and HLD.  He was admitted to the service of hospital medicine with HCAP.  He presented to the ED with a one day onset of fever and cough.  Symptoms were associated with fatigue, nausea, wheezing and hemoptysis. He stated that he was recently admitted for a stomach virus about three weeks ago.  He denied chest pain, sob, abdominal pain, vomiting, diarrhea, dysuria and leg swelling.    Hospital Course:  No notes on file    Interval History:  Still with productive cough- dark yellow/blood tinged. ARMAS a little better.   Reports midsternal chest pain this am "buring"   Known to Dr. Dunlap.       Review of Systems   Constitutional: Positive for fatigue. Negative for appetite change, chills and diaphoresis.   HENT: Positive for congestion and sore throat. Negative for hearing loss.    Respiratory: Positive for cough and shortness of breath. Negative for wheezing.    Cardiovascular: Positive for chest pain. Negative for palpitations and leg swelling.   Gastrointestinal: Positive for abdominal pain. Negative for blood in stool and nausea.   Musculoskeletal: Negative for arthralgias, back pain and myalgias.   Neurological: Negative for dizziness, syncope and light-headedness.   Psychiatric/Behavioral: Negative for agitation and confusion. The patient is not nervous/anxious.      Objective:     Vital Signs (Most Recent):  Temp: 97.5 °F (36.4 °C) (11/08/18 1139)  Pulse: 82 (11/08/18 1139)  Resp: 20 (11/08/18 1139)  BP: (!) 161/77 (11/08/18 1139)  SpO2: 95 % (11/08/18 1139) Vital Signs (24h " Range):  Temp:  [96.5 °F (35.8 °C)-98.2 °F (36.8 °C)] 97.5 °F (36.4 °C)  Pulse:  [62-82] 82  Resp:  [16-20] 20  SpO2:  [95 %-98 %] 95 %  BP: (136-161)/(61-77) 161/77     Weight: 89.2 kg (196 lb 11.2 oz)  Body mass index is 25.95 kg/m².    Intake/Output Summary (Last 24 hours) at 11/8/2018 1247  Last data filed at 11/8/2018 0600  Gross per 24 hour   Intake 300 ml   Output 1200 ml   Net -900 ml      Physical Exam   Constitutional: He is oriented to person, place, and time. He appears well-developed and well-nourished.   HENT:   Head: Normocephalic and atraumatic.   Nose: Nose normal.   Mouth/Throat: Oropharynx is clear and moist.   Eyes: Conjunctivae and EOM are normal. Pupils are equal, round, and reactive to light.   Neck: Normal range of motion. Neck supple.   Cardiovascular: Normal rate, regular rhythm and intact distal pulses.   Murmur (ASM) heard.  Pulmonary/Chest: Effort normal. He has no wheezes.   Coarse. No wheezes   Abdominal: Soft. Bowel sounds are normal. There is no tenderness.   Musculoskeletal: Normal range of motion.   Neurological: He is alert and oriented to person, place, and time.   Skin: Skin is warm and dry.   AVF to LUE +T/B   Psychiatric: He has a normal mood and affect. His behavior is normal.   Nursing note and vitals reviewed.      Significant Labs:   BMP:   Recent Labs   Lab 11/08/18  0819 11/08/18  0926   GLU  --  187*   NA  --  134*   K  --  4.3   CL  --  107   CO2  --  15*   BUN  --  72*   CREATININE  --  5.7*   CALCIUM  --  8.2*   MG 2.2  --      CBC:   Recent Labs   Lab 11/07/18  0415 11/08/18  0926   WBC 5.70 8.20   HGB 8.0* 9.1*   HCT 23.7* 27.1*   * 125*       Significant Imaging: I have reviewed and interpreted all pertinent imaging results/findings within the past 24 hours.    Reviewed EKG. No signs significant changes.   + SR with PVC   Assessment/Plan:      * HCAP (healthcare-associated pneumonia)    Presents with one day onset of fever and cough.  Recent hospitalization  3 weeks ago.    Supplemental oxygen.  IV Vanc and Zosyn - renal dosing.  Sputum for culture.  Consult Pulmonology - follow recommendations.  Duo neb treatments  procalcitonin negative.       Acute on chronic combined systolic and diastolic heart failure    6/2018 EF 46% with DD. Repeat echo-- echo reviewed. EF 51%  Lasix 60 mg IV x 1 dose.  Accurate I/O  Check BNP--elevated.   Renal/Low Na diet  Consult cards       Hypocalcemia    Recent PTH intact normal  Trend Ca       Hyponatremia    Possibly due to hypervolemia.        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  Dr. Guillen consulted from ED - agreeable for consultation and to keep patient at this facility.  Follow pulmonary specialty recommendations.  resolved         Chronic kidney disease, stage 5    Chronic problem. Stage 5 - non-oliguric, not on HD.  Signs of uremia. Creatinine at baseline.  Continue to monitor BUN/SCr.  Monitor electrolytes and I/O's.  Avoid nonessential nephrotoxins.  Renal dose medications for Estimated Creatinine Clearance: 11.9 mL/min (A) (based on SCr of 5.7 mg/dL (H)).   Consult nephrology.            Thrombocytopenia    Trend CBC. stable       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, not on chronic dialysis    Chronic problem.  Stable.  Monitor H/H.  Continue iron supplementation.  Transfuse for hemodynamic instability and/or H/H <7/21  Lab Results   Component Value Date    HGB 9.1 (L) 11/08/2018            Benign prostatic hyperplasia without lower urinary tract symptoms    Chronic problem.  Continue Flomax and Proscar.         Angina effort    Trend troponin- negative.   Check eKG  Cards consulted.  Resume home imdur       Gout, arthritis    Chronic. Continue Allopurinol.         CAD (coronary artery disease), 2V CABG 2007    History of CAD s/p CABG.  No s/s of angina.  Continue ASA, Statin, and BB.  Telemetry monitoring.  Monitor for s/s of ACS.       Hyperlipidemia,  baseline     Chronic problem. Continue Statin therapy.  Lab Results   Component Value Date    LDLCALC 46.4 (L) 07/26/2018            Essential hypertension    Chronic problem. Controlled. Continue home medications and monitor b/p closely, adjusting as necessary.           VTE Risk Mitigation (From admission, onward)        Ordered     heparin (porcine) injection 5,000 Units  Every 12 hours      11/08/18 1245     IP VTE HIGH RISK PATIENT  Once      11/06/18 0206     Place LOYD hose  Until discontinued      11/06/18 0206     Place sequential compression device  Until discontinued      11/06/18 0206     Reason for No Pharmacological VTE Prophylaxis  Once      11/06/18 0206        Discussed with patient regarding initiation of HD. He would benefit from HD. Patient wishes for PD at home. This will take time to obtain PD cath and received education. Awaiting cardiac clearance for PD placement.     Discussed with daughter results of studies and POC via phone.     Discussed with Dr. Dunlap    Time spent seeing patient( greater than 1/2 spent in direct contact) : 60 min    Bell Wilkinson NP  Department of Hospital Medicine   [facility]

## 2018-11-09 VITALS
HEIGHT: 73 IN | TEMPERATURE: 97 F | HEART RATE: 65 BPM | SYSTOLIC BLOOD PRESSURE: 146 MMHG | BODY MASS INDEX: 28.4 KG/M2 | DIASTOLIC BLOOD PRESSURE: 71 MMHG | WEIGHT: 214.31 LBS | RESPIRATION RATE: 18 BRPM | OXYGEN SATURATION: 92 %

## 2018-11-09 LAB
BASOPHILS # BLD AUTO: 0 K/UL
BASOPHILS NFR BLD: 0 %
DIFFERENTIAL METHOD: ABNORMAL
EOSINOPHIL # BLD AUTO: 0.2 K/UL
EOSINOPHIL NFR BLD: 3.3 %
ERYTHROCYTE [DISTWIDTH] IN BLOOD BY AUTOMATED COUNT: 16 %
HCT VFR BLD AUTO: 26.3 %
HGB BLD-MCNC: 8.6 G/DL
LYMPHOCYTES # BLD AUTO: 1.5 K/UL
LYMPHOCYTES NFR BLD: 20 %
MCH RBC QN AUTO: 30 PG
MCHC RBC AUTO-ENTMCNC: 32.8 G/DL
MCV RBC AUTO: 92 FL
MONOCYTES # BLD AUTO: 0.4 K/UL
MONOCYTES NFR BLD: 4.8 %
NEUTROPHILS # BLD AUTO: 5.3 K/UL
NEUTROPHILS NFR BLD: 71.9 %
PLATELET # BLD AUTO: 128 K/UL
PMV BLD AUTO: 8.8 FL
POCT GLUCOSE: 117 MG/DL (ref 70–110)
RBC # BLD AUTO: 2.87 M/UL
VANCOMYCIN SERPL-MCNC: 22 UG/ML
WBC # BLD AUTO: 7.4 K/UL

## 2018-11-09 PROCEDURE — 85025 COMPLETE CBC W/AUTO DIFF WBC: CPT

## 2018-11-09 PROCEDURE — 25000003 PHARM REV CODE 250: Performed by: NURSE PRACTITIONER

## 2018-11-09 PROCEDURE — 94761 N-INVAS EAR/PLS OXIMETRY MLT: CPT

## 2018-11-09 PROCEDURE — 99232 SBSQ HOSP IP/OBS MODERATE 35: CPT | Mod: ,,, | Performed by: INTERNAL MEDICINE

## 2018-11-09 PROCEDURE — 94640 AIRWAY INHALATION TREATMENT: CPT

## 2018-11-09 PROCEDURE — 36415 COLL VENOUS BLD VENIPUNCTURE: CPT

## 2018-11-09 PROCEDURE — 25000003 PHARM REV CODE 250: Performed by: HOSPITALIST

## 2018-11-09 PROCEDURE — 80202 ASSAY OF VANCOMYCIN: CPT

## 2018-11-09 PROCEDURE — 25000003 PHARM REV CODE 250: Performed by: INTERNAL MEDICINE

## 2018-11-09 PROCEDURE — 63600175 PHARM REV CODE 636 W HCPCS: Performed by: HOSPITALIST

## 2018-11-09 PROCEDURE — 63600175 PHARM REV CODE 636 W HCPCS: Performed by: NURSE PRACTITIONER

## 2018-11-09 PROCEDURE — 25000242 PHARM REV CODE 250 ALT 637 W/ HCPCS: Performed by: HOSPITALIST

## 2018-11-09 RX ORDER — VANCOMYCIN HCL IN 5 % DEXTROSE 1G/250ML
1000 PLASTIC BAG, INJECTION (ML) INTRAVENOUS ONCE
Status: DISCONTINUED | OUTPATIENT
Start: 2018-11-09 | End: 2018-11-09

## 2018-11-09 RX ORDER — SODIUM BICARBONATE 650 MG/1
650 TABLET ORAL 3 TIMES DAILY
Qty: 90 TABLET | Refills: 0 | COMMUNITY
Start: 2018-11-09 | End: 2019-01-15 | Stop reason: ALTCHOICE

## 2018-11-09 RX ORDER — CIPROFLOXACIN 500 MG/1
500 TABLET ORAL 2 TIMES DAILY
Qty: 14 TABLET | Refills: 0 | Status: SHIPPED | OUTPATIENT
Start: 2018-11-09 | End: 2018-11-09

## 2018-11-09 RX ORDER — TORSEMIDE 20 MG/1
20 TABLET ORAL DAILY
Qty: 30 TABLET | Refills: 0 | Status: SHIPPED | OUTPATIENT
Start: 2018-11-09 | End: 2018-11-16 | Stop reason: SDUPTHER

## 2018-11-09 RX ORDER — CIPROFLOXACIN 500 MG/1
500 TABLET ORAL 2 TIMES DAILY
Qty: 14 TABLET | Refills: 0 | Status: SHIPPED | OUTPATIENT
Start: 2018-11-09 | End: 2018-11-16

## 2018-11-09 RX ORDER — VANCOMYCIN HCL IN 5 % DEXTROSE 1G/250ML
1000 PLASTIC BAG, INJECTION (ML) INTRAVENOUS ONCE
Status: COMPLETED | OUTPATIENT
Start: 2018-11-09 | End: 2018-11-09

## 2018-11-09 RX ADMIN — TRAMADOL HYDROCHLORIDE 50 MG: 50 TABLET, FILM COATED ORAL at 12:11

## 2018-11-09 RX ADMIN — ACETAMINOPHEN 1000 MG: 500 TABLET, FILM COATED ORAL at 12:11

## 2018-11-09 RX ADMIN — Medication 1 CAPSULE: at 08:11

## 2018-11-09 RX ADMIN — PIPERACILLIN AND TAZOBACTAM 2.25 G: 2; .25 INJECTION, POWDER, LYOPHILIZED, FOR SOLUTION INTRAVENOUS; PARENTERAL at 08:11

## 2018-11-09 RX ADMIN — VANCOMYCIN HYDROCHLORIDE 1000 MG: 1 INJECTION, POWDER, LYOPHILIZED, FOR SOLUTION INTRAVENOUS at 10:11

## 2018-11-09 RX ADMIN — SODIUM BICARBONATE 650 MG TABLET 650 MG: at 03:11

## 2018-11-09 RX ADMIN — STANDARDIZED SENNA CONCENTRATE AND DOCUSATE SODIUM 1 TABLET: 8.6; 5 TABLET, FILM COATED ORAL at 08:11

## 2018-11-09 RX ADMIN — FLUTICASONE PROPIONATE 100 MCG: 50 SPRAY, METERED NASAL at 08:11

## 2018-11-09 RX ADMIN — PIPERACILLIN AND TAZOBACTAM 2.25 G: 2; .25 INJECTION, POWDER, LYOPHILIZED, FOR SOLUTION INTRAVENOUS; PARENTERAL at 12:11

## 2018-11-09 RX ADMIN — IPRATROPIUM BROMIDE AND ALBUTEROL SULFATE 3 ML: .5; 3 SOLUTION RESPIRATORY (INHALATION) at 12:11

## 2018-11-09 RX ADMIN — LOSARTAN POTASSIUM 100 MG: 25 TABLET ORAL at 08:11

## 2018-11-09 RX ADMIN — ASPIRIN 81 MG: 81 TABLET, COATED ORAL at 08:11

## 2018-11-09 RX ADMIN — FINASTERIDE 5 MG: 5 TABLET, FILM COATED ORAL at 08:11

## 2018-11-09 RX ADMIN — SODIUM BICARBONATE 650 MG TABLET 650 MG: at 08:11

## 2018-11-09 RX ADMIN — TAMSULOSIN HYDROCHLORIDE 0.4 MG: 0.4 CAPSULE ORAL at 08:11

## 2018-11-09 RX ADMIN — CARVEDILOL 6.25 MG: 6.25 TABLET, FILM COATED ORAL at 08:11

## 2018-11-09 RX ADMIN — IPRATROPIUM BROMIDE AND ALBUTEROL SULFATE 3 ML: .5; 3 SOLUTION RESPIRATORY (INHALATION) at 07:11

## 2018-11-09 RX ADMIN — HEPARIN SODIUM 5000 UNITS: 5000 INJECTION, SOLUTION INTRAVENOUS; SUBCUTANEOUS at 08:11

## 2018-11-09 RX ADMIN — ALLOPURINOL 100 MG: 100 TABLET ORAL at 08:11

## 2018-11-09 RX ADMIN — PANTOPRAZOLE SODIUM 40 MG: 40 TABLET, DELAYED RELEASE ORAL at 08:11

## 2018-11-09 RX ADMIN — ATORVASTATIN CALCIUM 80 MG: 40 TABLET, FILM COATED ORAL at 08:11

## 2018-11-09 NOTE — PROGRESS NOTES
11/09/18 0746   Patient Assessment/Suction   Level of Consciousness (AVPU) alert   Respiratory Effort Normal;Unlabored   All Lung Fields Breath Sounds diminished   PRE-TX-O2-ETCO2   O2 Device (Oxygen Therapy) room air   SpO2 96 %   Pulse Oximetry Type Intermittent   $ Pulse Oximetry - Multiple Charge Pulse Oximetry - Multiple   Pulse 77   Resp 18   Aerosol Therapy   $ Aerosol Therapy Charges Aerosol Treatment   Respiratory Treatment Status given   SVN/Inhaler Treatment Route mask   Position During Treatment HOB at 90 degrees   Patient Tolerance good   Post-Treatment   Post-treatment Heart Rate (beats/min) 80   Post-treatment Resp Rate (breaths/min) 18   All Fields Breath Sounds aeration increased   Duo Q6, tolerated tx well, vitals as charted, POX.

## 2018-11-09 NOTE — PLAN OF CARE
Problem: Patient Care Overview  Goal: Plan of Care Review  Medications reviewed and administered  Call light within reach  Bed low/wheels locked  SR up x 2   Cardiac/rernal diet  Up ad shashi  Safety maintained

## 2018-11-09 NOTE — CONSULTS
Labs: Scr 5.7 Crcl 11.9. Wt: 90.7 kg. Patient is being dosed by level at this time.     Vancomycin 1 g x 1 rescheduled for today at 1000. Next vancomycin level rescheduled for 11/10/18 at 0930.    Patient will be followed by pharmacy for changes in renal function, toxicity, and efficacy.     Thank you for allowing us to participate in this patient's care.       Joel Dobbins, PharmD

## 2018-11-09 NOTE — CONSULTS
Nephrology Consult Progress Note        Patient Name: Micheal Hunt  MRN: [id number]    Patient Class: IP- Inpatient   Admission Date: 11/5/2018  Length of Stay: 3 days  Date of Service: 11/9/2018    Attending Physician: Raymundo Ball MD  Primary Care Provider: Pk Lakhani MD    Reason for Consult: ckd4, anemia, htn, shpt    SUBJECTIVE:     HPI: 78M with CKD stage 5 and working fistula, followed by Dr. Rojo, HTN, BPH, was admitted with PNA symptoms and treated with IV abx, seen by Pulm as well. He was in recently with weakness and feeling unwell, did not start HD at that time. Labs show stable renal function at this time, he feels better.    11/8 VSS , better after IV diuretics yesterday. Spoke with patient, daughter, primary team. He will think tonight about initiating HD and let me know of his decision in AM.  11/9 VSS, patient decided to go home and f/u with Dr. Rojo and PD arrangements set in place.    Scheduled meds:   albuterol-ipratropium  3 mL Nebulization Q6H    allopurinol  100 mg Oral Daily    amLODIPine  10 mg Oral QHS    aspirin  81 mg Oral Daily    atorvastatin  80 mg Oral Daily    carvedilol  6.25 mg Oral BID WM    ferrous sulfate  325 mg Oral BID    finasteride  5 mg Oral Daily    fluticasone  2 spray Each Nare Daily    gabapentin  300 mg Oral QHS    heparin (porcine)  5,000 Units Subcutaneous Q12H    isosorbide mononitrate  10 mg Oral QHS    losartan  100 mg Oral Daily    mirtazapine  7.5 mg Oral QHS    montelukast  10 mg Oral QHS    pantoprazole  40 mg Oral Daily    piperacillin-tazobactam (ZOSYN) IVPB  2.25 g Intravenous Q8H    senna-docusate 8.6-50 mg  1 tablet Oral BID    sodium bicarbonate  650 mg Oral TID    tamsulosin  0.4 mg Oral Daily    vitamin renal formula (B-complex-vitamin c-folic acid)  1 capsule Oral Daily       Infusions:      PRN meds:  acetaminophen, dextrose 50%, dextrose 50%, glucagon (human recombinant), glucose, glucose, nitroGLYCERIN,  ondansetron, polyethylene glycol, sodium chloride 0.9%, traMADol, zolpidem    Review of Systems:  ROS    OBJECTIVE:     Vital Signs and IO (Last 24H):  Temp:  [96.5 °F (35.8 °C)-98 °F (36.7 °C)]   Pulse:  [64-83]   Resp:  [16-20]   BP: (136-160)/(67-74)   SpO2:  [92 %-96 %]   I/O last 3 completed shifts:  In: 1600 [P.O.:1600]  Out: 600 [Urine:600]    Wt Readings from Last 5 Encounters:   11/09/18 97.2 kg (214 lb 4.6 oz)   11/05/18 98.4 kg (216 lb 14.9 oz)   11/01/18 95.8 kg (211 lb 3.2 oz)   10/31/18 95.9 kg (211 lb 6.7 oz)   10/29/18 95.7 kg (211 lb)         Physical Exam:  Physical Exam   Constitutional: He is oriented to person, place, and time. He appears well-developed and well-nourished.   HENT:   Head: Normocephalic and atraumatic.   Mouth/Throat: Oropharynx is clear and moist.   Eyes: EOM are normal. Pupils are equal, round, and reactive to light. No scleral icterus.   Neck: Neck supple.   Cardiovascular: Normal rate and regular rhythm.   Pulmonary/Chest: Effort normal. No stridor. No respiratory distress. He has wheezes.   Abdominal: Soft. He exhibits no distension.   Musculoskeletal: Normal range of motion. He exhibits no edema or deformity.   Neurological: He is alert and oriented to person, place, and time. No cranial nerve deficit.   Skin: Skin is warm and dry. No rash noted. He is not diaphoretic. No erythema.   Psychiatric: He has a normal mood and affect. His behavior is normal.       Body mass index is 28.27 kg/m².    Laboratory:  Recent Labs   Lab 11/05/18 2216 11/06/18 0418 11/07/18  0415 11/08/18  0926   * 135* 134* 134*   K 4.2 3.9 4.1 4.3    109 110 107   CO2 14* 18* 15* 15*   BUN 81* 77* 75* 72*   CREATININE 5.5* 5.5* 5.5* 5.7*   ESTGFRAFRICA 10* 10* 10* 10*   EGFRNONAA 9* 9* 9* 9*   CALCIUM 7.9* 7.7* 7.5* 8.2*   ALBUMIN 3.5 3.2*  --  3.3*   PHOS  --  3.7  --  3.3       Recent Labs   Lab 11/07/18  0415 11/08/18  0926 11/09/18  1023   WBC 5.70 8.20 7.40   HGB 8.0* 9.1* 8.6*   HCT  23.7* 27.1* 26.3*   * 125* 128*   MCV 91 91 92   MCHC 33.7 33.7 32.8   MONO 10.3  0.6 4.2  0.3 4.8  0.4       Recent Labs   Lab 11/05/18  2216 11/06/18  0418 11/08/18  0926   ALKPHOS 78 71  --    BILITOT 0.3 0.4  --    PROT 6.5 5.9*  --    ALBUMIN 3.5 3.2* 3.3*   ALT 35 32  --    AST 23 16  --        ASSESSMENT/PLAN:     Active Hospital Problems    Diagnosis  POA    *HCAP (healthcare-associated pneumonia) [J18.9]  Yes    Acute on chronic combined systolic and diastolic heart failure [I50.43]  Yes    Mild asthma with acute exacerbation [J45.901]  Yes    Hemoptysis [R04.2]  Yes    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]  Yes    Hyponatremia [E87.1]  Yes    Hypocalcemia [E83.51]  Yes    Mild malnutrition [E44.1]  Yes    Debility [R53.81]  Yes    Chronic kidney disease, stage 5 [N18.5]  Yes    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     CKD stage 5  Acidosis, metabolic  Uremia  Volume overload  No NSAIDs, ACEI/ARB, IV contrast or other nephrotoxins.  Keep MAP > 60, SBP > 100.  Dose meds for GFR < 30 ml/min.  Renal diet - low K, low phos.  No need for urgent HD at this time.  Will send home on Torsemide 20 mg PO daily.  Agree to dc and follow as planned with Dr. Rojo and PD clinic.    Anemia of CKD  Stable. Monitor.  No need for NIC.  No need for IV iron.    MBD / Secondary HPT  Monitor phos levels. Low phos diet.    HTN  Controlled.   Tolerate asymptomatic HTN up to -160.  Continue home meds.  Low sodium diet.    BPH  Seems  controlled, monitor for now.    Thank you for allowing us to participate in the care of your patient!   We will follow the patient and provide recommendations as needed.    Darrel Cary MD    [facility]  [address] [place] [address]  [place] [id number]    [phone] - tel  [phone] - fax    11/9/2018 10:32 AM

## 2018-11-09 NOTE — PROGRESS NOTES
[facility]  Cardiology  Progress Note    Patient Name: Micheal Hunt  MRN: [id number]  Admission Date: 11/5/2018  Hospital Length of Stay: 3 days  Code Status: Full Code   Attending Physician: Raymundo Ball MD   Primary Care Physician: Pk Lakhani MD  Expected Discharge Date: 11/9/2018  Principal Problem:HCAP (healthcare-associated pneumonia)    Subjective:     Hospital Course:  As per HPI and consult note.  Interval History: Doing well sitting up bedside, no new concerns voiced. Looking forward to going home. Is cleared from cardiac standpoint for his peritoneal catheter placement. Plan to see outpt.    ROS   As per above otherwise negative.    Objective:     Vital Signs (Most Recent):  Temp: 98 °F (36.7 °C) (11/09/18 0815)  Pulse: 83 (11/09/18 0815)  Resp: 17 (11/09/18 0815)  BP: (!) 160/74 (11/09/18 0815)  SpO2: 96 % (11/09/18 0815) Vital Signs (24h Range):  Temp:  [96.5 °F (35.8 °C)-98 °F (36.7 °C)] 98 °F (36.7 °C)  Pulse:  [64-88] 83  Resp:  [16-20] 17  SpO2:  [93 %-98 %] 96 %  BP: (136-161)/(67-77) 160/74     Weight: 97.2 kg (214 lb 4.6 oz)  Body mass index is 28.27 kg/m².    SpO2: 96 %  O2 Device (Oxygen Therapy): room air      Intake/Output Summary (Last 24 hours) at 11/9/2018 1029  Last data filed at 11/9/2018 0400  Gross per 24 hour   Intake 1075 ml   Output --   Net 1075 ml       Lines/Drains/Airways     Drain                 Hemodialysis AV Fistula Left upper arm -- days          Peripheral Intravenous Line                 Peripheral IV - Single Lumen 11/05/18 2235 Right Forearm 3 days                Physical Exam   As per previous exam.    Significant Labs: All pertinent lab results from the last 24 hours have been reviewed.    Significant Imaging:  None  Assessment and Plan:     Pneumonia - pulmonary consulted.      Chronic kidney disease stage 5 - defer to hospitalist service, cleared for catheter placement from cardiac standpoint.     Anemia - anemia likely secondary to  renal impairment.     CAD - continue home medications.     Hyperlipidemia - continue statins.     HTN - stable, continue home medications and monitor.     CHF - elevated BNP, dialysis may help lower this level.     CP - atypical CP, normal troponin, EKG unremarkable. Pain worse with inspiration likely muscloskeletal.    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/06/2018 Yes    Hyponatremia [E87.1] 11/06/2018 Yes    Hypocalcemia [E83.51] 11/06/2018 Yes    Mild malnutrition [E44.1] 11/06/2018 Yes    Debility [R53.81] 11/06/2018 Yes    Chronic kidney disease, stage 5 [N18.5] 06/14/2018 Yes    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, not on chronic dialysis [N18.5, D63.1] 02/25/2018 Yes    Thrombocytopenia [D69.6] 02/25/2018 Yes    Benign prostatic hyperplasia without lower urinary tract symptoms [N40.0] 08/30/2017 Yes    Angina effort [I20.8] 02/18/2016 No     Chronic    Gout, arthritis [M10.9] 04/27/2012 Yes    CAD (coronary artery disease), 2V CABG 2007 [I25.10]  Yes    Essential hypertension [I10] 03/27/2012 Yes    Hyperlipidemia, baseline  [E78.5] 03/27/2012 Yes      Problems Resolved During this Admission:       VTE Risk Mitigation (From admission, onward)        Ordered     heparin (porcine) injection 5,000 Units  Every 12 hours      11/08/18 1245     IP VTE HIGH RISK PATIENT  Once      11/06/18 0206     Place LOYD hose  Until discontinued      11/06/18 0206     Place sequential compression device  Until discontinued      11/06/18 0206     Reason for No Pharmacological VTE Prophylaxis  Once      11/06/18 0206          Janine Umanzor,  FNP  Cardiology  [facility]

## 2018-11-09 NOTE — PLAN OF CARE
Problem: Patient Care Overview  Goal: Plan of Care Review  Outcome: Ongoing (interventions implemented as appropriate)  Bed in low position, call light is within reach, SR up x 2, patient instructed to use call light for assistance to prevents falls, Cardiac monitored SR, no complaints of pain, PRN stool softener given per patient request, IV Abx tolerated,  no adverse events, safety maintained, patient is resting between care, will continue to monitor and round.

## 2018-11-09 NOTE — PLAN OF CARE
1040  Met with patient at bedside regarding HH referral.  Patient is in agreement with HH and does not have a preference in agency; patient signed disclosure form.    1050  Sent HH referral to [facility] via [facility].    The referral for the patient in [facility] VENU/PCU, room 216, bed 216 A admitted at 11/5/2018 9:46 PM has been updated by Harvey Marie.  Update: Accepted       11/09/18 1043   Discharge Reassessment   Assessment Type Discharge Planning Reassessment

## 2018-11-09 NOTE — PLAN OF CARE
Scheduled hospital f/u with PCP office; placed on AVS.       11/09/18 1043   Discharge Reassessment   Assessment Type Discharge Planning Reassessment

## 2018-11-09 NOTE — PLAN OF CARE
11/09/18 1129   Final Note   Assessment Type Final Discharge Note   Anticipated Discharge Disposition Home-Health   Hospital Follow Up  Appt(s) scheduled? Yes

## 2018-11-09 NOTE — PLAN OF CARE
11/08/18 2005   Patient Assessment/Suction   Level of Consciousness (AVPU) alert   All Lung Fields Breath Sounds diminished   Rhythm/Pattern, Respiratory depth regular;pattern regular;unlabored   PRE-TX-O2-ETCO2   O2 Device (Oxygen Therapy) room air   SpO2 96 %   Pulse Oximetry Type Intermittent   $ Pulse Oximetry - Multiple Charge Pulse Oximetry - Multiple   Pulse 74   Resp 20   Aerosol Therapy   $ Aerosol Therapy Charges Aerosol Treatment   Respiratory Treatment Status given   SVN/Inhaler Treatment Route mask   Position During Treatment HOB at 45 degrees   Patient Tolerance good   Post-Treatment   Post-treatment Heart Rate (beats/min) 77   Post-treatment Resp Rate (breaths/min) 20   All Fields Breath Sounds aeration increased

## 2018-11-09 NOTE — CONSULTS
Date: 11/9/2018   Micheal Hunt [id number] is a 78 y.o. male who has been consulted for vancomycin dosing.    The patient has the following labs:     Creatinine (mg/dl)  WBC Count  Serum creatinine: 5.7 mg/dL (H) 11/08/18 0926  Estimated creatinine clearance: 11.9 mL/min (A)  Lab Results  Component  Value  Date  WBC  8.20  11/08/2018    Current weight is 89.2 kg (196 lb 11.2 oz)      Pt's renal function is not stable.  Pharmacy will dose by level. Target goal is 15-20 mg/dL.   Vanc level 11/9/2018 at 0457 was 22.0 mg/dL.  Pharmacy will dose vancomycin 1000 mg x1.  A vancomycin level will be ordered on 11/10 at 0700.      Patient will be followed by pharmacy for changes in renal function, toxicity, and efficacy.    Thank you for allowing us to participate in this patient's care.     Alice Thornton, NahidD

## 2018-11-09 NOTE — PROGRESS NOTES
Progress Note  PULMONARY    Admit Date: 11/5/2018 11/09/2018      SUBJECTIVE:     Nov 7, sl wheezes, feels better, ask for tramadol tid (renal failure max dose 200/d),  Follows dr Bishop,    Nov 8- feels better but had small amount brb hemoptysis with some chest pain this am. Smaller amount than 2 prior episodes.  Nov 9, says chest discomfort (denied chest pain when I initially saw pt) and cough are resolved.  Feels well now.      PFSH and Allergies reviewed.    OBJECTIVE:     Vitals (Most recent):  Vitals:    11/09/18 0815   BP: (!) 160/74   Pulse: 83   Resp: 17   Temp: 98 °F (36.7 °C)       Vitals (24 hour range):  Temp:  [96.5 °F (35.8 °C)-98 °F (36.7 °C)]   Pulse:  [64-88]   Resp:  [16-20]   BP: (136-161)/(67-77)   SpO2:  [93 %-98 %]       Intake/Output Summary (Last 24 hours) at 11/9/2018 0849  Last data filed at 11/9/2018 0400  Gross per 24 hour   Intake 1075 ml   Output --   Net 1075 ml          Physical Exam:  The patient's neuro status (alertness,orientation,cognitive function,motor skills,), pharyngeal exam (oral lesions, hygiene, abn dentition,), Neck (jvd,mass,thyroid,nodes in neck and above/below clavicle),RESPIRATORY(symmetry,effort,fremitus,percussion,auscultation),  Cor(rhythm,heart tones including gallops,perfusion,edema)ABD(distention,hepatic&splenomegaly,tenderness,masses), Skin(rash,cyanosis),Psyc(affect,judgement,).  Exam negative except for these pertinent findings:    Alert, min rales left lower lung posterior, chest is symmetric, no distress, normal percussion, normal fremitus      Radiographs reviewed: view by direct vision  11/8 except for known left lower abn- cxr good  Results for orders placed during the hospital encounter of 02/25/18   X-Ray Chest 1 View    Narrative Comparison: 12/29/17    Technique: Single AP portable chest radiograph.    Findings:Stable cardiomegaly and aortic tortuosity noted. Sternotomy changes are noted consistent with prior CABG. The lungs are clear. No pleural  abnormality or pneumothorax is seen. No radiographically apparent pulmonary nodule. Cholecystectomy clips. Degenerative changes in the shoulders.    Impression 1.  Stable cardiomegaly and aortic tortuosity. Changes of prior CABG. No acute radiographic findings.        Electronically signed by: Shun Pacheco MD  Date:     02/25/18  Time:    12:49    ]    Labs     Recent Labs   Lab 11/08/18  0926   WBC 8.20   HGB 9.1*   HCT 27.1*   *     Recent Labs   Lab 11/08/18  0926   *   K 4.3      CO2 15*   BUN 72*   CREATININE 5.7*   *   CALCIUM 8.2*   PHOS 3.3   ALBUMIN 3.3*   No results for input(s): PH, PCO2, PO2, HCO3 in the last 24 hours.  Microbiology Results (last 7 days)     Procedure Component Value Units Date/Time    Culture, Respiratory with Gram Stain [[id number]] Collected:  11/08/18 1551    Order Status:  Completed Specimen:  Respiratory from Sputum, Expectorated Updated:  11/09/18 0025     Gram Stain (Respiratory) <10 epithelial cells per low power field.     Gram Stain (Respiratory) Rare WBC's     Gram Stain (Respiratory) Many Gram negative rods     Gram Stain (Respiratory) Many Gram positive cocci     Gram Stain (Respiratory) Few Gram positive rods     Gram Stain (Respiratory) Few yeast    Culture, Respiratory with Gram Stain [[id number]] Collected:  11/07/18 1924    Order Status:  Completed Specimen:  Respiratory from Sputum, Expectorated Updated:  11/08/18 0047     Respiratory Culture Specimen inadequate - culture not performed     Gram Stain (Respiratory) >10epis/lfp and <than many WBC's     Gram Stain (Respiratory) Predominance of oropharyngeal carolee. Please recollect.    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]  Yes    Hyponatremia [E87.1]  Yes    Hypocalcemia [E83.51]  Yes    Mild malnutrition [E44.1]  Yes    Debility [R53.81]  Yes    Chronic kidney disease, stage 5 [N18.5]  Yes    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     Results for ARTEM WEI (MRN [id number]) as of 11/7/2018 08:09   Ref. Range 11/5/2018 22:16 11/6/2018 04:18 11/7/2018 04:15   WBC Latest Ref Range: 3.90 - 12.70 K/uL 10.30 9.20 5.70             Plan:   Nov 7, sl wheezes, feels better, ask for tramadol tid (renal failure max dose 200/d),  Follows dr Bishop,  Pt ambulating and seems to be doing well.    Proteinuria noted, non gap acidosis likely renal- bicarb reasonable.  There is no pulmonary artery aneursym.      Pt was on oral cipro sice 10/ bid with renal failure and apparently  Failed. There are no blood cultures resulted on epic at this time?  No sputum submitted?   Order bicarb and dose prednisone x2.      Best to monitor another day as failed outpt abx?      Addendum- pt says did not take cipro for fear side effects.      Nov 8- ct reviewed as was today's cxr.  Pt is being rx for abx after presenting with fever and chill and hemoptysis - ct had airways open and lll infiltrate.  Pt had some brb hemoptysis today.    cta would be a problem with renal status. Will check d dimer and u/s legs and monitor.  Airways ok on ct -  Dx not  clear.  Pt is on low dose steroid.  There is no concern re pulm art aneurysm.    Nov 9,  Sputum from yesterday pending this am.  Pt did not use cipro pta- was ordered but pt didn't take.  Pt did have chill, fever, hemoptysis at admit with ? vague slight recurrence yesterday?  procalcitonin was 0.18 admit, below 0.10 would suggest not pneumonia.  No cta due to renal failure.      If no dvt on leg u/s this am--outpt on abx reasonable.  Will f/u in office with ct to assure left lower lung abn clears.  Working dx is pneumonia- has some atypical features.                       .

## 2018-11-10 NOTE — HOSPITAL COURSE
Patient monitored closely during hospitalization. Telemetry ordered. He did have a short run of Vtach and was asymptomatic. Cardiology consulted. He was initiated on IV antibiotics for pneumonia. Pulmonary consulted. He received oral steroids during admission. Nephrology consulted for CKD stage V management. Patient encouraged to initiated HD, however is not open to starting this admission, He wishes to follow up with Dr. Bishop. He was noted to have diastolic HF and received Lasix 60 mg IV with good response. Dyspnea improved. ECHO obtained. Labs monitored. Procalcitonin negative. He is feeling better and is stable for DC from pulmonary, nephrology, and cardiac standpoint.     PE; chest coarse. Heart RRR with Murmum LUE fistula

## 2018-11-10 NOTE — DISCHARGE SUMMARY
[facility] Medicine  Discharge Summary      Patient Name: Micheal Hunt  MRN: [id number]  Admission Date: 11/5/2018  Hospital Length of Stay: 3 days  Discharge Date and Time: 11/9/2018  4:35 PM  Attending Physician: Annita att. providers found   Discharging Provider: Stefanie Wilkinson NP  Primary Care Provider: Pk Lakhani MD      HPI:   Micheal Hunt is a 77 yo male with PMHx of CKD stage 5, CAD, HTN, BPH, and HLD.  He was admitted to the service of hospital medicine with HCAP.  He presented to the ED with a one day onset of fever and cough.  Symptoms were associated with fatigue, nausea, wheezing and hemoptysis. He stated that he was recently admitted for a stomach virus about three weeks ago.  He denied chest pain, sob, abdominal pain, vomiting, diarrhea, dysuria and leg swelling.    * No surgery found *      Hospital Course:   Patient monitored closely during hospitalization. Telemetry ordered. He did have a short run of Vtach and was asymptomatic. Cardiology consulted. He was initiated on IV antibiotics for pneumonia. Pulmonary consulted. He received oral steroids during admission. Nephrology consulted for CKD stage V management. Patient encouraged to initiated HD, however is not open to starting this admission, He wishes to follow up with Dr. Bishop. He was noted to have diastolic HF and received Lasix 60 mg IV with good response. Dyspnea improved. ECHO obtained. Labs monitored. Procalcitonin negative. He is feeling better and is stable for DC from pulmonary, nephrology, and cardiac standpoint.     PE; chest coarse. Heart RRR with Murmum LUE fistula      Consults:   Consults (From admission, onward)        Status Ordering Provider     Inpatient consult to Nephrology  Once     Provider:  Darrel Cary MD    Completed STEFANIE WILKINSON     Inpatient consult to Pulmonology  Once     Provider:  Mihir Guillen MD    Completed KAILA STARKS          No new Assessment  & Plan notes have been filed under this hospital service since the last note was generated.  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/06/2018 Yes    Hyponatremia [E87.1] 11/06/2018 Yes    Hypocalcemia [E83.51] 11/06/2018 Yes    Mild malnutrition [E44.1] 11/06/2018 Yes    Debility [R53.81] 11/06/2018 Yes    Chronic kidney disease, stage 5 [N18.5] 06/14/2018 Yes    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, not on chronic dialysis [N18.5, D63.1] 02/25/2018 Yes    Thrombocytopenia [D69.6] 02/25/2018 Yes    Benign prostatic hyperplasia without lower urinary tract symptoms [N40.0] 08/30/2017 Yes    Angina effort [I20.8] 02/18/2016 No     Chronic    Gout, arthritis [M10.9] 04/27/2012 Yes    CAD (coronary artery disease), 2V CABG 2007 [I25.10]  Yes    Essential hypertension [I10] 03/27/2012 Yes    Hyperlipidemia, baseline  [E78.5] 03/27/2012 Yes      Problems Resolved During this Admission:       Discharged Condition: stable    Disposition: Home-Health Care [facility]    Follow Up:  Follow-up Information     Pk Lakhani MD In 1 week.    Specialty:  Family Medicine  Why:  hospital follow up  Contact information:  [address] [facility]  [place] [address] [phone]             Frankie Rojo MD In 1 week.    Specialty:  Nephrology  Contact information:  [address] [facility] [place]  [place] [address] [phone]             Getachew Dunlap MD In 2 weeks.    Specialty:  Cardiology  Contact information:  [address] [facility]  [address]  [place] [address] [phone]                 Patient Instructions:      Referral to Home health   Referral Priority:  Routine Referral Type: Home Health Care   Referral Reason: Specialty Services Required   Requested Specialty: Home Health Services   Number of Visits Requested: 1     Diet renal     Notify your health care provider if you experience any of the following:  persistent dizziness, light-headedness, or visual disturbances     Notify your health care provider if you experience any of the following:  worsening rash     Notify your health care provider if you experience any of the following:  severe persistent headache     Notify your health care provider if you experience any of the following:  difficulty breathing or increased cough     Notify your health care provider if you experience any of the following:  redness, tenderness, or signs of infection (pain, swelling, redness, odor or green/yellow discharge around incision site)     Notify your health care provider if you experience any of the following:  severe uncontrolled pain     Notify your health care provider if you experience any of the following:  persistent nausea and vomiting or diarrhea     Activity as tolerated       Significant Diagnostic Studies: Labs: BMP: No results for input(s): GLU, NA, K, CL, CO2, BUN, CREATININE, CALCIUM, MG in the last 48 hours. and CMP No results for input(s): NA, K, CL, CO2, GLU, BUN, CREATININE, CALCIUM, PROT, ALBUMIN, BILITOT, ALKPHOS, AST, ALT, ANIONGAP, ESTGFRAFRICA, EGFRNONAA in the last 48 hours.     US Lower Extremity Veins Bilateral [[id number]] Resulted: 11/09/18 1532   Order Status: Completed Updated: 11/09/18 1535   Narrative:     EXAMINATION:  US LOWER EXTREMITY VEINS BILATERAL    CLINICAL HISTORY:  hemoptysis, infiltrate;    TECHNIQUE:  Duplex and color flow Doppler and dynamic compression was performed of the bilateral lower extremity veins was performed.    COMPARISON:  None    FINDINGS:  Right thigh veins: The common femoral, femoral, popliteal, upper greater saphenous, and deep femoral veins are patent and free of  thrombus. The veins are normally compressible and have normal phasic flow and augmentation response.    Right calf veins: The visualized calf veins are patent.    Left thigh veins: The common femoral, femoral, popliteal, upper greater saphenous, and deep femoral veins are patent and free of thrombus. The veins are normally compressible and have normal phasic flow and augmentation response.    Left calf veins: The visualized calf veins are patent.    Miscellaneous: None   Impression:       No evidence of deep venous thrombosis in either lower extremity.      Electronically signed by: Salty Patiño MD  Date: 11/09/2018  Time: 15:32   X-Ray Chest 1 View [[id number]] Resulted: 11/08/18 1419   Order Status: Completed Updated: 11/08/18 1421   Narrative:     EXAMINATION:  XR CHEST 1 VIEW    CLINICAL HISTORY:  cough;    TECHNIQUE:  Single frontal view of the chest was performed.    COMPARISON:  11/5/2018    FINDINGS:  The cardiomediastinal silhouette is stable..  There are median sternotomy sutures.  Thoracic aorta is tortuous    The left lower lobe infiltrate with best seen on the lateral view on the prior exam and follow-up to include lateral view is suggested.  As visualized on the frontal view it appears decreased.    Right lung remains clear no effusion.   Impression:       Decrease of the left basilar infiltrate compared to the prior exam.  Suggest follow-up study to include lateral view as it is best seen on the lateral view.      Electronically signed by: Mayuri Castro MD  Date: 11/08/2018  Time: 14:19   CT Chest Without Contrast [[id number]] (Abnormal) Resulted: 11/06/18 1246   Order Status: Completed Updated: 11/06/18 1249   Narrative:     EXAMINATION:  CT CHEST WITHOUT CONTRAST    CLINICAL HISTORY:  hemoptysis, chills;    TECHNIQUE:  Low dose axial images, sagittal and coronal reformations were obtained from the thoracic inlet to the lung bases. Contrast was not  administered.    COMPARISON:  None.    FINDINGS:  Structures at the base of the neck are unremarkable.  1.9 cm pleuroparenchymal nodular opacity in the anterior right upper lobe series 2, image 20. more diffuse airspace disease throughout the left lower lobe.  Atelectasis or scar in the right lower lobe.  Four-chamber cardiomegaly with calcification of the aortic valve and coronary arteries.  Prominent size of the pulmonary arterial trunk.  Ascending aorta 4.3 cm diameter.  Moderate aortic and branch vessel atherosclerosis.  Mildly enlarged right paratracheal lymph node 11 mm short axis diameter series 2, image 20.  Cholecystectomy.  Linear metallic density within the proximal gastric body possibly ingested contents or ingested foreign body.  Mild multilevel degenerative changes in the spine.  Diffuse idiopathic skeletal hyperostosis.  Sternal wires bilateral rotator cuff repair.   Impression:       1. Left lower lobe airspace disease suggestive of pneumonia.  2. Additional small airspace opacity in the right upper lobe.  This could relate to the primary process, although has a more rounded contour and a nodule is possible.  Recommend 3 month follow-up CT for further characterization.  3.  Enlarged pulmonary arterial trunk which can be seen in the setting of pulmonary hypertension.  4. Ectasia of the ascending aorta.  5. Cardiomegaly and coronary artery disease.  This report was flagged in Epic as abnormal.      Electronically signed by: Sean Avendaño  Date: 11/06/2018  Time: 12:46   X-Ray Chest PA And Lateral [[id number]] Resulted: 11/06/18 1106   Order Status: Completed Updated: 11/06/18 1109   Narrative:     EXAMINATION:  XR CHEST PA AND LATERAL    CLINICAL HISTORY:  Chest Pain;    TECHNIQUE:  PA and lateral views of the chest were performed.    COMPARISON:  11/01/2018    FINDINGS:  New left lower lobe airspace disease.  Similar cardiomegaly.  Sternal wires.  Aortic atherosclerosis.  Prominence of the pulmonary  vasculature.  No pleural effusion or pneumothorax.  Surgical clips upper abdomen.   Impression:       Left lower lobe airspace disease which could reflect pneumonia or aspiration.      Electronically signed by: Sean Avendaño  Date: 11/06/2018  Time: 11:06        echo  · The left ventricle cavity is mildly dilated.  · Mild-to-moderate aortic regurgitation.  · Left ventricle ejection fraction is low normal at 51%  · Left ventricle shows mild concentric hypertrophy.  · RV systolic function is normal.  · Left atrium is moderately dilated.  · Moderate aortic valve stenosis.  · Aortic valve area is 1.30 cm2; peak velocity is 2.96 m/s; mean gradient is 21.48 mmHg.  · Right atrium is mildly dilated.  · Mild mitral regurgitation.  · Normal central venous pressure (3 mm Hg).  · The estimated PA systolic pressure is 27.21 mm Hg  · No definite change from Echo in 6/2018         Pending Diagnostic Studies:     None         Medications:  Transfer Medications (for Discharge Readmit only):   No current facility-administered medications for this encounter.      Current Outpatient Medications   Medication Sig Dispense Refill    albuterol 90 mcg/actuation inhaler 2 puffs every 4 hours as needed for cough, wheeze, or shortness of breath 3 Inhaler 3    albuterol-ipratropium (DUO-NEB) 2.5 mg-0.5 mg/3 mL nebulizer solution INHALE 1 VIAL BY NEBULIZER EVERY 6 HOURS AS NEEDED FOR WHEEZING 270 mL 0    allopurinol (ZYLOPRIM) 100 MG tablet Take 1 tablet (100 mg total) by mouth once daily. 90 tablet 1    amLODIPine (NORVASC) 10 MG tablet Take 1 tablet (10 mg total) by mouth every evening. 90 tablet 1    aspirin (ECOTRIN) 81 MG EC tablet Take 81 mg by mouth once daily.        atorvastatin (LIPITOR) 80 MG tablet TAKE 1 TABLET (80 MG TOTAL) BY MOUTH ONCE DAILY. 90 tablet 3    carvedilol (COREG) 6.25 MG tablet Take 1 tablet (6.25 mg total) by mouth 2 (two) times daily with meals. 60 tablet 3    finasteride (PROSCAR) 5 mg tablet TAKE 1  TABLET ONCE DAILY. 90 tablet 3    fluticasone (FLONASE) 50 mcg/actuation nasal spray 2 sprays (100 mcg total) by Each Nare route once daily. (Patient taking differently: 2 sprays by Each Nare route daily as needed. ) 3 Bottle 3    gabapentin (NEURONTIN) 300 MG capsule Take 1 capsule (300 mg total) by mouth every evening. 90 capsule 3    isosorbide mononitrate (ISMO,MONOKET) 10 mg tablet TAKE 1 TABLET BY MOUTH EVERY DAY IN THE EVENING 30 tablet 3    meclizine (ANTIVERT) 25 mg tablet TAKE 1 TABLET (25 MG TOTAL) BY MOUTH 3 (THREE) TIMES DAILY AS NEEDED. 90 tablet 0    sodium chloride (SALINE NASAL MIST) 3 % Mist 1 spray by Nasal route 2 (two) times daily.      tamsulosin (FLOMAX) 0.4 mg Cap Take 1 capsule (0.4 mg total) by mouth once daily. 30 capsule 2    traMADol (ULTRAM) 50 mg tablet Take 1 tablet (50 mg total) by mouth every 12 (twelve) hours as needed for Pain. 60 tablet 2    ciprofloxacin HCl (CIPRO) 500 MG tablet Take 1 tablet (500 mg total) by mouth 2 (two) times daily. for 7 days 14 tablet 0    COLCRYS 0.6 mg tablet TAKE 1 TABLET (0.6 MG TOTAL) BY MOUTH ONCE DAILY. 30 tablet 1    cyclobenzaprine (FLEXERIL) 5 MG tablet One tab po with breakfast and lunch take 2 tabs po before bedtime for up to 7 days 30 tablet 0    iron polysaccharides (NIFEREX) 150 mg iron Cap Take 1 capsule (150 mg total) by mouth 2 (two) times daily before meals. 60 capsule 11    losartan (COZAAR) 100 MG tablet Take 1 tablet (100 mg total) by mouth once daily. 90 tablet 0    mirtazapine (REMERON) 7.5 MG Tab Take 1 tablet (7.5 mg total) by mouth every evening. 30 tablet 2    NITROSTAT 0.4 mg SL tablet PLACE 1 TABLET (0.4 MG TOTAL) UNDER THE TONGUE EVERY 5 (FIVE) MINUTES AS NEEDED FOR CHEST PAIN. 25 tablet 3    omeprazole (PRILOSEC) 20 MG capsule Take 1 capsule (20 mg total) by mouth once daily. 90 capsule 1    polyethylene glycol (GLYCOLAX) 17 gram/dose powder Take 17 g by mouth 2 (two) times daily before meals. 1 Bottle 4     sodium bicarbonate 650 MG tablet Take 1 tablet (650 mg total) by mouth 3 (three) times daily. 90 tablet 0    torsemide (DEMADEX) 20 MG Tab Take 1 tablet (20 mg total) by mouth once daily. 30 tablet 0    vitamin renal formula, B-complex-vitamin c-folic acid, (NEPHROCAP) 1 mg Cap Take 1 capsule by mouth once daily. 30 capsule 0       Indwelling Lines/Drains at time of discharge:   Lines/Drains/Airways     Drain                 Hemodialysis AV Fistula Left upper arm -- days                Time spent on the discharge of patient: 45 minutes  Patient was seen and examined on the date of discharge and determined to be suitable for discharge.         Bell Wilkinson NP  Department of Hospital Medicine  [facility]

## 2018-11-12 ENCOUNTER — OUTPATIENT CASE MANAGEMENT (OUTPATIENT)
Dept: ADMINISTRATIVE | Facility: OTHER | Age: 78
End: 2018-11-12

## 2018-11-12 ENCOUNTER — TELEPHONE (OUTPATIENT)
Dept: FAMILY MEDICINE | Facility: CLINIC | Age: 78
End: 2018-11-12

## 2018-11-12 ENCOUNTER — TELEPHONE (OUTPATIENT)
Dept: MEDSURG UNIT | Facility: HOSPITAL | Age: 78
End: 2018-11-12

## 2018-11-12 ENCOUNTER — LAB VISIT (OUTPATIENT)
Dept: LAB | Facility: HOSPITAL | Age: 78
End: 2018-11-12
Attending: INTERNAL MEDICINE
Payer: MEDICARE

## 2018-11-12 DIAGNOSIS — N18.30 CHRONIC KIDNEY DISEASE, STAGE III (MODERATE): ICD-10-CM

## 2018-11-12 DIAGNOSIS — R53.83 FATIGUE: ICD-10-CM

## 2018-11-12 DIAGNOSIS — M10.9 GOUT, UNSPECIFIED: ICD-10-CM

## 2018-11-12 DIAGNOSIS — I10 ESSENTIAL HYPERTENSION, MALIGNANT: ICD-10-CM

## 2018-11-12 DIAGNOSIS — M19.90 SENILE ARTHRITIS: ICD-10-CM

## 2018-11-12 DIAGNOSIS — N40.0 BENIGN ENLARGEMENT OF PROSTATE: ICD-10-CM

## 2018-11-12 DIAGNOSIS — N18.4 CHRONIC KIDNEY DISEASE, STAGE IV (SEVERE): ICD-10-CM

## 2018-11-12 DIAGNOSIS — N18.5 CHRONIC KIDNEY DISEASE, STAGE V: Primary | ICD-10-CM

## 2018-11-12 PROBLEM — R04.2 HEMOPTYSIS: Status: RESOLVED | Noted: 2018-11-06 | Resolved: 2018-11-12

## 2018-11-12 LAB
ALBUMIN SERPL BCP-MCNC: 3.3 G/DL
ALP SERPL-CCNC: 71 U/L
ALT SERPL W/O P-5'-P-CCNC: 41 U/L
ANION GAP SERPL CALC-SCNC: 10 MMOL/L
AST SERPL-CCNC: 27 U/L
BASOPHILS # BLD AUTO: 0 K/UL
BASOPHILS NFR BLD: 0.7 %
BILIRUB SERPL-MCNC: 0.4 MG/DL
BUN SERPL-MCNC: 82 MG/DL
CALCIUM SERPL-MCNC: 8.4 MG/DL
CHLORIDE SERPL-SCNC: 106 MMOL/L
CO2 SERPL-SCNC: 21 MMOL/L
CREAT SERPL-MCNC: 5.4 MG/DL
DIFFERENTIAL METHOD: ABNORMAL
EOSINOPHIL # BLD AUTO: 0.3 K/UL
EOSINOPHIL NFR BLD: 6.3 %
ERYTHROCYTE [DISTWIDTH] IN BLOOD BY AUTOMATED COUNT: 16 %
EST. GFR  (AFRICAN AMERICAN): 11 ML/MIN/1.73 M^2
EST. GFR  (NON AFRICAN AMERICAN): 9 ML/MIN/1.73 M^2
GLUCOSE SERPL-MCNC: 109 MG/DL
HCT VFR BLD AUTO: 26.2 %
HGB BLD-MCNC: 8.7 G/DL
LYMPHOCYTES # BLD AUTO: 1.6 K/UL
LYMPHOCYTES NFR BLD: 30.5 %
MCH RBC QN AUTO: 30.2 PG
MCHC RBC AUTO-ENTMCNC: 33.2 G/DL
MCV RBC AUTO: 91 FL
MONOCYTES # BLD AUTO: 0.4 K/UL
MONOCYTES NFR BLD: 7 %
NEUTROPHILS # BLD AUTO: 3 K/UL
NEUTROPHILS NFR BLD: 55.5 %
PLATELET # BLD AUTO: 150 K/UL
PMV BLD AUTO: 8.1 FL
POTASSIUM SERPL-SCNC: 4.4 MMOL/L
PROT SERPL-MCNC: 6.3 G/DL
PTH-INTACT SERPL-MCNC: 388.8 PG/ML
RBC # BLD AUTO: 2.88 M/UL
SODIUM SERPL-SCNC: 137 MMOL/L
WBC # BLD AUTO: 5.4 K/UL

## 2018-11-12 PROCEDURE — 85025 COMPLETE CBC W/AUTO DIFF WBC: CPT

## 2018-11-12 PROCEDURE — 80053 COMPREHEN METABOLIC PANEL: CPT

## 2018-11-12 PROCEDURE — 36415 COLL VENOUS BLD VENIPUNCTURE: CPT

## 2018-11-12 PROCEDURE — 87517 HEPATITIS B DNA QUANT: CPT

## 2018-11-12 PROCEDURE — 83970 ASSAY OF PARATHORMONE: CPT

## 2018-11-12 NOTE — PROGRESS NOTES
Thank you for the referral. The following patient has been assigned to Ondina Lima RN with Outpatient Complex Care Management for high risk screening.    Reason for referral:  HCAP (healthcare-associated pneumonia)    Please contact [facility] at [phone] with any questions.    Thank you,    Viktoria Lester    Outpatient Case Management

## 2018-11-12 NOTE — TELEPHONE ENCOUNTER
----- Message from Colette Corley MA sent at 11/12/2018  2:36 PM CST -----  Please forward this important TCC information to your provider in order to maximize the post discharge care delivery of this patient.     C3 nurse attempted to contact Micheal Rivas for a TCC post hospital discharge follow up call. No answer. C3 nurse left a voice message and OOC # given. The patient has a scheduled HOSFU appointment with Akanksha CRUZ on 12/3/18 @ 10am. Appointment not within 7-14 days of TCC HOSPFU, message forward to PCP staff.   Respectfully,   Jaskaran Mistry RN, [facility]   [facility] C[facility]   [email or web address]     Please do not reply to this message, as this inbox is not routinely monitored.  (Routing comment)

## 2018-11-12 NOTE — TELEPHONE ENCOUNTER
Unable to located earlier hospital follow up appointment. Patient's existing appointment on 12-3-18 is soonest appointment noted. Appointment added to wait list.

## 2018-11-13 DIAGNOSIS — R93.89 ABNORMAL CT SCAN, CHEST: Primary | ICD-10-CM

## 2018-11-13 DIAGNOSIS — R04.2 HEMOPTYSIS: ICD-10-CM

## 2018-11-14 ENCOUNTER — LAB VISIT (OUTPATIENT)
Dept: LAB | Facility: HOSPITAL | Age: 78
End: 2018-11-14
Attending: INTERNAL MEDICINE
Payer: MEDICARE

## 2018-11-14 ENCOUNTER — TELEPHONE (OUTPATIENT)
Dept: PULMONOLOGY | Facility: CLINIC | Age: 78
End: 2018-11-14

## 2018-11-14 DIAGNOSIS — R05.9 COUGH: ICD-10-CM

## 2018-11-14 DIAGNOSIS — D63.1 ANEMIA DUE TO END STAGE RENAL DISEASE: ICD-10-CM

## 2018-11-14 DIAGNOSIS — Z86.39 HISTORY OF IRON DEFICIENCY: ICD-10-CM

## 2018-11-14 DIAGNOSIS — R09.89 RALES: ICD-10-CM

## 2018-11-14 DIAGNOSIS — N18.6 ANEMIA DUE TO END STAGE RENAL DISEASE: ICD-10-CM

## 2018-11-14 DIAGNOSIS — R06.02 SOB (SHORTNESS OF BREATH): ICD-10-CM

## 2018-11-14 LAB
ALBUMIN SERPL BCP-MCNC: 3.3 G/DL
ALP SERPL-CCNC: 72 U/L
ALT SERPL W/O P-5'-P-CCNC: 36 U/L
ANION GAP SERPL CALC-SCNC: 12 MMOL/L
AST SERPL-CCNC: 21 U/L
BACTERIA SPEC AEROBE CULT: NORMAL
BASOPHILS # BLD AUTO: 0 K/UL
BASOPHILS NFR BLD: 0.4 %
BILIRUB SERPL-MCNC: 0.3 MG/DL
BUN SERPL-MCNC: 79 MG/DL
CALCIUM SERPL-MCNC: 8 MG/DL
CHLORIDE SERPL-SCNC: 108 MMOL/L
CO2 SERPL-SCNC: 20 MMOL/L
CREAT SERPL-MCNC: 5.5 MG/DL
DIFFERENTIAL METHOD: ABNORMAL
EOSINOPHIL # BLD AUTO: 0.3 K/UL
EOSINOPHIL NFR BLD: 6.8 %
ERYTHROCYTE [DISTWIDTH] IN BLOOD BY AUTOMATED COUNT: 15.6 %
EST. GFR  (AFRICAN AMERICAN): 10 ML/MIN/1.73 M^2
EST. GFR  (NON AFRICAN AMERICAN): 9 ML/MIN/1.73 M^2
GLUCOSE SERPL-MCNC: 116 MG/DL
GRAM STN SPEC: NORMAL
HBV DNA SERPL NAA+PROBE-ACNC: <10 IU/ML
HBV DNA SERPL NAA+PROBE-LOG IU: <1 LOG (10) IU/ML
HBV DNA SERPL QL NAA+PROBE: NOT DETECTED
HCT VFR BLD AUTO: 25.3 %
HGB BLD-MCNC: 8.6 G/DL
IRON SERPL-MCNC: 51 UG/DL
LYMPHOCYTES # BLD AUTO: 1.3 K/UL
LYMPHOCYTES NFR BLD: 26.2 %
MCH RBC QN AUTO: 30.5 PG
MCHC RBC AUTO-ENTMCNC: 33.8 G/DL
MCV RBC AUTO: 90 FL
MONOCYTES # BLD AUTO: 0.5 K/UL
MONOCYTES NFR BLD: 9.5 %
NEUTROPHILS # BLD AUTO: 2.8 K/UL
NEUTROPHILS NFR BLD: 57.1 %
PLATELET # BLD AUTO: 153 K/UL
PMV BLD AUTO: 8 FL
POTASSIUM SERPL-SCNC: 4.3 MMOL/L
PROT SERPL-MCNC: 6.1 G/DL
RBC # BLD AUTO: 2.81 M/UL
SATURATED IRON: 15 %
SODIUM SERPL-SCNC: 140 MMOL/L
TOTAL IRON BINDING CAPACITY: 343 UG/DL
TRANSFERRIN SERPL-MCNC: 232 MG/DL
WBC # BLD AUTO: 4.9 K/UL

## 2018-11-14 PROCEDURE — 36415 COLL VENOUS BLD VENIPUNCTURE: CPT

## 2018-11-14 PROCEDURE — 85025 COMPLETE CBC W/AUTO DIFF WBC: CPT

## 2018-11-14 PROCEDURE — 83540 ASSAY OF IRON: CPT

## 2018-11-14 PROCEDURE — 80053 COMPREHEN METABOLIC PANEL: CPT

## 2018-11-14 NOTE — TELEPHONE ENCOUNTER
Called pt to try and schedule ct scan. Left number to call hospital to schedule at his convenience     ----- Message from Mihir Guillen MD sent at 11/13/2018  3:23 PM CST -----  Regarding: RE: orders  CT scan ordered  ----- Message -----  From: Shahnaz Lopez LPN  Sent: 11/13/2018  12:10 PM  To: Mihir Guillen MD  Subject: orders                                           Pt says you told him to get CT of chest before hospital follow up?   I do not see any orders or mention of it.  Please advise

## 2018-11-15 ENCOUNTER — OFFICE VISIT (OUTPATIENT)
Dept: HEMATOLOGY/ONCOLOGY | Facility: CLINIC | Age: 78
End: 2018-11-15
Payer: MEDICARE

## 2018-11-15 ENCOUNTER — TELEPHONE (OUTPATIENT)
Dept: CARDIOLOGY | Facility: CLINIC | Age: 78
End: 2018-11-15

## 2018-11-15 ENCOUNTER — OFFICE VISIT (OUTPATIENT)
Dept: CARDIOLOGY | Facility: CLINIC | Age: 78
End: 2018-11-15
Payer: MEDICARE

## 2018-11-15 VITALS
SYSTOLIC BLOOD PRESSURE: 112 MMHG | TEMPERATURE: 98 F | DIASTOLIC BLOOD PRESSURE: 61 MMHG | HEIGHT: 73 IN | BODY MASS INDEX: 30.27 KG/M2 | WEIGHT: 228.38 LBS | HEART RATE: 58 BPM | RESPIRATION RATE: 20 BRPM

## 2018-11-15 VITALS
HEIGHT: 73 IN | HEART RATE: 59 BPM | WEIGHT: 228.81 LBS | SYSTOLIC BLOOD PRESSURE: 119 MMHG | BODY MASS INDEX: 30.33 KG/M2 | OXYGEN SATURATION: 99 % | DIASTOLIC BLOOD PRESSURE: 65 MMHG

## 2018-11-15 DIAGNOSIS — D64.9 ERYTHROPOIETIN (EPO) STIMULATING AGENT ANEMIA MANAGEMENT PATIENT: Primary | ICD-10-CM

## 2018-11-15 DIAGNOSIS — N18.5 ANEMIA DUE TO STAGE 5 CHRONIC KIDNEY DISEASE, NOT ON CHRONIC DIALYSIS: ICD-10-CM

## 2018-11-15 DIAGNOSIS — K21.00 GASTROESOPHAGEAL REFLUX DISEASE WITH ESOPHAGITIS: ICD-10-CM

## 2018-11-15 DIAGNOSIS — D63.1 ANEMIA DUE TO STAGE 5 CHRONIC KIDNEY DISEASE, NOT ON CHRONIC DIALYSIS: ICD-10-CM

## 2018-11-15 DIAGNOSIS — D63.8 ANEMIA OF CHRONIC DISEASE: ICD-10-CM

## 2018-11-15 DIAGNOSIS — Z87.01 HISTORY OF RECENT PNEUMONIA: ICD-10-CM

## 2018-11-15 DIAGNOSIS — Z79.899 ERYTHROPOIETIN (EPO) STIMULATING AGENT ANEMIA MANAGEMENT PATIENT: Primary | ICD-10-CM

## 2018-11-15 DIAGNOSIS — D50.9 IRON DEFICIENCY ANEMIA, UNSPECIFIED IRON DEFICIENCY ANEMIA TYPE: ICD-10-CM

## 2018-11-15 DIAGNOSIS — N18.5 CHRONIC KIDNEY DISEASE, STAGE 5: ICD-10-CM

## 2018-11-15 DIAGNOSIS — D80.9 IMMUNOGLOBULIN DEFICIENCY: ICD-10-CM

## 2018-11-15 DIAGNOSIS — I25.10 CORONARY ARTERY DISEASE INVOLVING NATIVE CORONARY ARTERY OF NATIVE HEART WITHOUT ANGINA PECTORIS: Primary | ICD-10-CM

## 2018-11-15 DIAGNOSIS — E78.00 PURE HYPERCHOLESTEROLEMIA: ICD-10-CM

## 2018-11-15 DIAGNOSIS — I11.9 HYPERTENSIVE LEFT VENTRICULAR HYPERTROPHY, WITHOUT HEART FAILURE: ICD-10-CM

## 2018-11-15 DIAGNOSIS — I51.9 LV DYSFUNCTION: ICD-10-CM

## 2018-11-15 PROBLEM — R53.81 DEBILITY: Status: RESOLVED | Noted: 2018-11-06 | Resolved: 2018-11-15

## 2018-11-15 PROCEDURE — 99214 OFFICE O/P EST MOD 30 MIN: CPT | Mod: PBBFAC,27,PO | Performed by: INTERNAL MEDICINE

## 2018-11-15 PROCEDURE — 99215 OFFICE O/P EST HI 40 MIN: CPT | Mod: S$PBB,,, | Performed by: INTERNAL MEDICINE

## 2018-11-15 PROCEDURE — 99213 OFFICE O/P EST LOW 20 MIN: CPT | Mod: PBBFAC,PO | Performed by: INTERNAL MEDICINE

## 2018-11-15 PROCEDURE — 99999 PR PBB SHADOW E&M-EST. PATIENT-LVL IV: CPT | Mod: PBBFAC,,, | Performed by: INTERNAL MEDICINE

## 2018-11-15 PROCEDURE — 99999 PR PBB SHADOW E&M-EST. PATIENT-LVL III: CPT | Mod: PBBFAC,,, | Performed by: INTERNAL MEDICINE

## 2018-11-15 PROCEDURE — 99214 OFFICE O/P EST MOD 30 MIN: CPT | Mod: S$PBB,,, | Performed by: INTERNAL MEDICINE

## 2018-11-15 RX ORDER — SODIUM CHLORIDE 0.9 % (FLUSH) 0.9 %
10 SYRINGE (ML) INJECTION
Status: CANCELLED | OUTPATIENT
Start: 2018-12-13

## 2018-11-15 RX ORDER — HEPARIN 100 UNIT/ML
500 SYRINGE INTRAVENOUS
Status: CANCELLED | OUTPATIENT
Start: 2018-12-13

## 2018-11-15 RX ORDER — HEPARIN 100 UNIT/ML
500 SYRINGE INTRAVENOUS
Status: CANCELLED | OUTPATIENT
Start: 2018-11-29

## 2018-11-15 RX ORDER — SODIUM CHLORIDE 0.9 % (FLUSH) 0.9 %
10 SYRINGE (ML) INJECTION
Status: CANCELLED | OUTPATIENT
Start: 2018-11-29

## 2018-11-15 NOTE — TELEPHONE ENCOUNTER
----- Message from Mary King sent at 11/15/2018  4:25 PM CST -----  Type: Needs Medical Advice    Who Called:  Ronal Castaneda    Best Call Back Number: [phone]  Additional Information: Patient was seen by Dr. Dunlap today and he need a clearance faxed to Dr. Bae office, please completed form that sent via fax. Doctor is anxiously awaiting the fax from the appt today, needed for upcoming surgery

## 2018-11-15 NOTE — PROGRESS NOTES
Subjective:    Patient ID:  Micheal Hunt is a 78 y.o. male who presents for  of 3 month follow-up  For post CABG, post hospitalization for HCP  PCP: Dr. Lakhani  Hematology: Dr. Wells, chronic disease anemia, borderline iron deficiency  Renal: Dr. Rojo, see q 6 weeks  ENT: Dr. Nails, now Dr. Chahal in [place]  GI: Dr. Harris, Dr. Saleem  Physical medicine: Dr. Whitehead  Urology: Dr. Herring  Orthopedic: Dr. Fox in [place], [place], [phone], Dr. Álvarez  Lives alone, daughter, Tonya, on the same ranch, 20 acre, 4 horses, 20 chicken, no  workers, prior commercial contractor, grandson, Uziel  Daughter, Crow, nutritionist in [facility] supportive of Mediterranean diet, now on plant-based diet, and a Yoga instruction.      Jordanian [place]ian ([place]) male, retired cottrell at [employer], here for pre-op evaluation. Significant cardiac history with previous CABG in [place]. Stress test earlier this year was abnormal: Lexiscan -  Nuclear Quantitative Functional Analysis:                                    LVEF: 47 % (normal is 47 - 59)                                               LVED Volume: 195 ml (normal is 91 - 155)                                     LVES Volume: 104 ml (normal is 40 - 78)                                                                                                                   Impression: ABNORMAL MYOCARDIAL PERFUSION                                    1. There is evidence for mild to moderate myocardial ischemia in the         septal wall of the left ventricle, and moderate myocardial ischemia in       the anteroapical wall of the left ventricle with associated stress           induced LV cavity dilatation.                                                2. There is mild intensity fixed defect in the lateral wall of the left      ventricle, consistent with myocardial injury.                                3. There is abnormal wall motion at rest showing severe hypokinesis of        the septal wall of the left ventricle.                                       4. Resting LV function is normal.  (normal is 47 - 59)                       5. The left ventricular volume is moderately increased at rest.              6. The extracardiac distribution of radioactivity is normal.    Subsequent angiogram, 5/2012:  ANGIOGRAPHIC RESULTS:                                                                                                                                  DIAGNOSTIC:                                                                  Patient has a right dominant coronary artery.                                                                                                             - Left Main Coronary Artery:                                                 The LM is occluded. There is JOURDAN 0 flow.                                                                                                                 - Left Anterior Descending Artery:                                           The LAD has luminal irregularities. There is JOURDAN 3 flow. Fed                from the LIMA graft                                                                                                                                       - Left Circumflex Artery:                                                    The LCX was not found.                                                                                                                                    - Right Coronary Artery:                                                     The RCA has luminal irregularities. There is JOURDAN 3 flow.                    appear to be fed from SVG, could not cannulate, poorly visualized.                                                                                        - Aortic Root:                                                               The Aortic Root is normal. There is JOURDAN 3 flow.                             Lesion  Details: Moderate proximal tortuosity, mild to                        moderate calcification.                                                                                                                                   - HERRON To LAD:                                                               The LIMA to LAD is normal. There is JOURDAN 3 flow.                                                                                                                                                                                       D. SUMMARY:                                                                                                                                               1. Three vessel coronary artery disease.                                     2. Normal LVEF.                                                              3. Mild aortic insufficiency.                                                4. Very difficult study                                                      5. Multiple attempts to cannulate SVG which appears to go to the RCA.        Native RCA appears to have an ostial occlusion.    Was continued on optimal medical management. Could not tolerate atenolol due to severe weakness. Recently without any cardiac complaints. Limited by left knee with soreness and pains. Went to [facility] in [place], [place] and told of the prior implant was too small and not stable. Anticipating re-op by Dr. Fox, a [employer] graduate, in [place]. [place].    Since visit of 10/2/2012, underwent left TKR with good results, had 16 weeks of rehab without much problem. Here today due to hypertension. Home record showed -185/83-99. Noted more dizziness when the pressures are high. Problem is helped by Meclizine. Have decreased exercise following the operation. Diet said to be no salt. No problem with medications,  need 90 days supply.    Since visit of 4/4, concern about his kidney, see telephone enc on 4/22 with BUN of  28, Scr 1.4, K+ 4.1, and eGFR 50. Old record reviewed, no significant julian in function since 6/2011. Agree now to try HCTZ 12.5 mg every other day along with increase in lisinopril to 80 mg daily. Home BP reviewed 159-189/85-99. No other new problem. Wants comprehensive blood work the next visit.    Since visit of 12/13, left sided CP is gone, appears to be due to straining while carrying a 34 lbs grandson. Discussed cath report and send to [place]. Home /80. Daughter feels he is stressed, personally do not feel so. Would like to be back in [place] but not family are here. Ill younger sister in [place]. Biking 3 times a week for 15 to 30 minutes twice a day. Do little weights every day.  Lexiscan, 12/19/2013  Nuclear Quantitative Functional Analysis:   LVEF: 42 % (normal is 47 - 59)  LVED Volume: 193 ml (normal is 91 - 155)  LVES Volume: 112 ml (normal is 40 - 78)    Impression: ABNORMAL MYOCARDIAL PERFUSION  1. There is evidence for mild myocardial ischemia in the anterior and lateral apical walls of the left ventricle with associated stress induced LV cavity dilatation.   2. The perfusion scan is free of evidence for myocardial injury.   3. There is abnormal wall motion at rest showing moderate global hypokinesis of the left ventricle.   4. There is resting LV dysfunction with a reduced ejection fraction of 42 %.  (normal is 47 - 59)  5. The left ventricular volume is moderately increased at rest.   6. The extracardiac distribution of radioactivity is normal.   7. When compared to the previous study from 04/12/2012, the LVEF have decreased with global hypokinesia.  8. [employer] SSS =2 =SDS, suggest that 2.5% of myocardium may be ischemic.    ECHO, 12/2013, CONCLUSIONS     1 - Biatrial enlargement.     2 - Enlarged left ventricular enlargement.     3 - Eccentric hypertrophy.     4 - Low normal to mildly depressed left ventricular systolic function (EF 50-55%).     5 - Left ventricular diastolic  dysfunction.     6 - Mild mitral regurgitation.     7 - Normal right ventricular systolic function .     8 - Mild to moderate aortic regurgitation.     9 - Some improvement in LV function with reduction in AR from echo on 9/29/2012.  Since visit of 5/2, had to go to [place] for sister, 60 year old with a CVA. Stopped HCTZ for 5 weeks due to traveling. No CP but some return of indigestion, previously helped by Prilosec. Used for about 4 weeks with benefit. Discuss Rx 4-8 weeks treatments are reasonable. Blood work on 6/3 LDL is 113, , with HDL of 33. Cr. Remains stable at 1.4 with K+ 4.1. PSA is elevated to > 4 on finasteride 5 mg for past 10 years, prescribed by Urologist in [place].  CBC is normal.    Since visit of 6/11, did not get the referral to Urology, also had recent fever and chills with lower abdominal pain while in [place] last Thursday to Friday, noted some weakened stream. Also at night can hear strong heart beat and take BP showing , would then take an additional 40 mg of lisinopril on top of 80 mg each AM. Blood work showed first time elevation of Scr to 1.6 without change in BUN and normal K+.     Since visit of 7/11, had problem with spironolactone, took only 2 doses and had severe dizziness for 2 days, also had to adjust timing of medications to avoid dizziness after medications. 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 Sleeping well. Other medications are fine, compliant. Blood work showed slight rise in Scr to 1.5-1.6 from 1.4 after spironolactone. Saw Dr. Herring and given antibiotic. BP at home 140-160/80.    Since visit of 8/15, feeling fine, no problem with medications, home BP similar with systolic of 140-150. 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 No problem note, no limits. Using Tylenol 500 mg BID for OA, helpful. Sleeping well, tried Melatonin and now just using warm milk at HS. Blood  "work reviewed, normal testosterone, uric acid 6.5, BMP with stable Cr of 1.5 with FBS of 103, Lipid panel shows LDL of 78.8 on 80 mg of atorvastatin.     Since visit of 9/17, saw Dr. Calderon for pre-op evaluation and suggested sooner follow up for abnormal stress test. Denies any CP but with occasional chest "congestion" with pleuritic CP with deep breathing, Occurs now and then, sometime related to heavy exertion. Helped with breathing Rx in hospital and albuterol inhaler at home. Last spell about a month ago, lasted for 30 minutes. Had OP left operation on 11/25, no problem. Since home no problem. CXR on 11/6 was normal. Recent labs - LDL 86, HDL 36, normal CMP and CBC.  Serum Cr 1.4, eGFR 49.1, stable. Request nebulizer for home use.    Since visit of 12/10, next morning woke up with high BP, 184/102, felt dizzy, no fall, then a constant heart "hurting", grade 5/10, seems to increase after meal, tried Rolaids and Tums without much help. Pain constant til now. Call answering service at 6 AM and advised to come to office for EKG and evaluation. EKG NSR with frequent APC, rate of 68, LVH with STT abnormalities, no acute change. Discussed atypical presentation of heart pains and also possible GI source of problem. Have not seen any GI specialist for his GERD. Also discussed use of NTG for chest pains. BP at home this AM, 174/100, obviously distressed.    Since visit of 1/9/2014, continue with some back pain over past 6 months, concern possibly due to high dose atorvastatin. Also noted some fatigue with palpitation in the middle of the night, have to get up 3 times for nocturia. No CP but BP elevated in the middle of night to 140/92. Last Holter in 4/2012: PREDOMINANT RHYTHM  1. Sinus rhythm with heart rates varying between 40 and 195 bpm with an average of 77 bpm.     VENTRICULAR ARRHYTHMIAS  1. There were occasional PVCs totalling 290 and averaging 12 per hour.  There were 2 couplets.    2. There were no episodes of " ventricular tachycardia.    SUPRA VENTRICULAR ARRHYTHMIAS  1. There were very frequent PACs totalling 5254 and averaging 228 per hour.  There were 143 couplets.    2. There were no episodes of sustained supraventricular tachycardia.    SINUS NODE FUNCTION  1. There was no evidence of high grade SA nicolás block.     AV CONDUCTION  1. There was no evidence of high grade AV block.     DIARY  1. The diary was not returned    MISCELLANEOUS  1. This was a tape of adequate length (23 hrs).    Also worries about friend in [place] dying without a definite cause. Some worries of kidney and liver due to medications. Last labs in 11/2013 reviewed.     Since visit of 3/17, no new problem, no further CP,   Holter done without symptoms - PREDOMINANT RHYTHM  1. Sinus arrhythmia with heart rates varying between 41 and 164 bpm with an average of 79 bpm.     VENTRICULAR ARRHYTHMIAS  1. There were occasional mostly monomorphic PVCs totalling 272 and averaging 11 per hour.  There was 1 couplet.    2. There were no episodes of ventricular tachycardia.    SUPRA VENTRICULAR ARRHYTHMIAS  1. There were frequent PACs totalling 1748 and averaging 72 per hour.  There were 9 bigeminal cycles.  There were 47 couplets. There were 5 triplets.     2. 1.5% of complexes.    3. There were no episodes of sustained supraventricular tachycardia.    SINUS NODE FUNCTION  1. There was no evidence of high grade SA nicolás block.     AV CONDUCTION  1. There was no evidence of high grade AV block.     2. The longest RR interval was 2110 msec.     DIARY  1. The diary was returned, but not completed    MISCELLANEOUS  1. This was a tape of adequate length (24 hrs).    Labs showed new glucose intolerance, , admit to using lot of sugar recently. CK is elevated with back pain. Last Lipid in 11/2013 LDL-C was 86.4. Sleep evaluation next month.     Since visit of 3/28/2014, stressed due to close sister illnesses with Alzheimer in [place], stayed there for 8 weeks and recent  return with weight gain. No definite muscular pains still with mild chronic low back pain. CP is better, occasional burning.  Home BP reviewed, mostly > 150/80, have occasional HA, every other week. Using HCTZ 25 mg , half tab every other day. Not yet to sleep evaluation. Discussed potential cause of resistant HTN due to untreated GRICELDA.  Labs reviewed CPK remains elevated 362 to 421, FBS improved from 127 to 110. Renal stable with Cr 1.6 to 1.5, K+ 4.1. Lipid LDL-C 69.8. Discussed optional of trying 10 mg of atorvastatin or continuing on 40 mg and be aware of muscle pains / weakness and to monitor CPK.    Since visit of 6/26, no new problem, labs showed slow progressive CKD eGFR now 40, Cr 1.7, BUN 25, . Want to see nephrologist.  Had Sleep study on 6/30 - CONCLUSION:  Nocturnal polysomnography demonstrates:  1.  Obstructive sleep apnea to be present with 44.2 events an hour with    desaturation to 81%.  2.  Sinus rhythm with occasional PVC.     PLAN:  He will return for nasal CPAP titration at a later date.    Since visit of 7/25, OK til 3 weeks ago, started to experience ARMAS, any rushing, similar to prior to CABG. Some CP, more like burning, grade 1/10, last until relax. ECG today SA, rate 56. 1st degree AVB, LVH with ST abnormalities. Called for earlier appointment. No problem with the medications. Just started CPAP this past Sunday, 3 days ago. Last lab again reviewed - K+ 4.1. Had prior problem with spironolactone. Home /95.     Since visit of 9/10, the CP and ARMAS have improved. Could not tolerate eplerenone 25 mg due to marked bradycardia, rate to 40 along with tiredness and weakness, not the expected side effects. Home /79, feeling better except for dry cough, nasal congestion, and bad HA, recurrent problem over the years. Best Rx with short course of Augmentin. Given Doxycycline without benefit. Lab today , BMP with Cr stable at 1.6, K+ 4.0. Also wants review with GI on GERD, and not  able to lose weight.    Since visit of 9/24/2014, no new problem, no problem with medications. GRICELDA reviewed, troubled by being awaken hourly during testing. Denies any fatigue, lots of energy. Labs - stable CKD eGFR 42, , K+ 4.1. Last urine 6/2013. Home BP good at 140/80-85. 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     Since visit of 12/5/2014, had problem with diverticulitis last month now corrected diet, no nuts, more fiber. Denies any CP nor SOB. No sleepiness. Weight up and down. Recent labs A1C 6.2%, LDL-C 59, Hgb 13.3, CMP showed eGFR 39 with Cr 1.7, K+ 3.8.    Since visit of 3/19/2015, no new problem. Had review with Dr. Álvarez, X-ray negative and completed 6 weeks of PT without much benefit. Being closely followed by Dr. Hodgson, recent labs shows eGFR 39, Cr 1.7, have proteinuria, , mild anemia, Hgb 13.4, iron profile is sufficient. Lipid excellent, LDL 58, non-HDL 89.    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 Today discomfort started after 3 zandra, had to stop for 20 minutes, did not use NTG. Similar problem over the last 8 months. Compliant with medications but home BP are high, 130-188/, HR 65-85. ECG shows NSR PAC, LVH, pulmonary pattern. Last lipid in 8/2015 LDL 58.2, non-HDL 90. Active biking twice a week for 30 minutes then farm walk daily.    Since visit of 2/18, continue to have occasional chest burning when rushing fast across pasture or lifting heavy bags. Has about 5 minutes of discomfort, grade 1/10. Also noted some nighttime dry cough, don't believe due to Lisinopril, like nasal congestion with PND. Daughter have Zyrtec, will try. Discussed options for Rx of Abnormal stress test, had difficult LHC in 2012 along with stage 3 CKD. Will proceed with optimize medical Rx first.   ECHO, 3/2016 CONCLUSIONS     1 - Biatrial enlargement.     2 - Enlarged left ventricular enlargement.     3 -  Eccentric hypertrophy.     4 - Low normal to mildly depressed left ventricular systolic function (EF 50-55%).     5 - Mild to moderate aortic regurgitation.     6 - Mild mitral regurgitation.     7 - Left ventricular diastolic dysfunction. E/e'(lat) is 10.  This along with the following abnormalities (ATUL = 49.13 and LVMI = 181.70) suggests significant diastolic dysfunction.     8 - Right ventricular enlargement with mildly depressed systolic function.     9 - The estimated PA systolic pressure is 28 mmHg.     10 - No significant change from Echo on 12/19/2013.     Lexiscan - Nuclear Quantitative Functional Analysis:   LVEF: 34 % (normal is 47 - 59)  LVED Volume: 192 ml (normal is 91 - 155)  LVES Volume: 126 ml (normal is 40 - 78)    Impression: ABNORMAL MYOCARDIAL PERFUSION  1. There is evidence for moderate myocardial ischemia in the inferolateral wall of the left ventricle with associated stress induced LV cavity dilatation with underlying injury present.   2. There is abnormal wall motion at rest showing moderate global hypokinesis of the left ventricle. severe hypokinesis of the inferolateral wall of the left ventricle.   3. There is resting LV dysfunction with a reduced ejection fraction of 34 %.  (normal is 47 - 59)  4. The left ventricular volume is mildly increased at rest.   5. The extracardiac distribution of radioactivity is normal.   6. When compared to the previous study from 12/19/2013, current study indicate inferolateral defects.  7. [employer] SSS=10, SRS=5, and SDS=5, suggest that 7% of myocardium may be ischemic.    Since visit of 3/14, feeling better, wanted no change with medications, long discussion on use of Coreg and need for titration. Also labs reviewed, mild anemia due to CKD, stage 3, eGFR 33, decreased from 40, Dr. Hodgson recommend better cholesterol control. , and non- on 80 mg of Atorvastatin. Want better diet.    Since visit of 4/25 had problem on 5/2/2016 with  "sudden onset of vertigo and right ear pain. Had review with Dr. Vasquez and medications changes recommended see telephone encounter note. Now review medications again and vertigo improved after taking Meclizine 4-25 mg tabs daily. Home BP log 127-170/, HR 63-82. Currently back on Coreg 3.125 mg bid. Discuss hope to proceed with Coreg titration with the goal of 25 mg bid.    In 6/2016, had tangled with the trees with review by Dr. Vasquez, right ribs bruised, no fracture, also vertigo with quick turn, fell. Still with some exertional chest burning, average twice a week, but very brief, just seconds. No problem with medications. Had review with Dr. Nails.    In 7/2016, multiple complains, generalized itching started about a week ago, stopped Coreg but itching still there. Also problem with recurrent vertigo exacerbated by head turning or bending, no fall but bothersome, able to do all farm chores. In addition noted to be more tired with the higher dose of Coreg. No SOB nor CP. Recent labs show NICK with Cr 2.2 to 2.5, eGFR down to 24. Will be seeing nephrologist 8/2/2016.    In 12/2016, problem with acute head congestion, frontal, seasonal, usually helped with Augmentin for 10 days. Requesting Rx, option discussed. Feeling "good" in general, able to do work without problem. Ran out of Zetia over a month ago. Lipid test LDL 84.4 on 40 mg of atorvastatin. Home /75-80. Dr. Vasquez had changed ACE-I to Valsartan due to cough.    In 3/2017, find a little tiredness and daytime sleepiness over the last 2 weeks, change to daylight saving made a little worst. 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 Sleep well, 6-8 hours, feel good on awakening. Home /80. Compliant with medications. Some concern of more frequent BM, 4-5 times daily. Eating more fruit. Lipid LDL 62.    In 6/2017, note lot of urine after kidney biopsy about 4 weeks ago. Biopsy reported as OK. Had stomach biopsy about a week ago, " "since then been feeling "yukki", was informed to expect it. Home BP is similar to office reading, Remain active on farm, lot of manual work no problem. No more CP nor ARMAS. ECG today suggest WAP, 64, left axis, LVH, PRWP and high lateral T-wave inversion no change from ECG in 2015. No problem with medications. First cousin age 46 yo,  of massive MI, no symptoms.    In 2017, noted bad itching in both legs for about 2 months, not controlled with topical Rx including steroid. Off Valsartan for 3 days without any changed but BP elevated to 150/90, restarted 4 days ago. Half life of 6 hours: therefore adequate off trial. Also have problem with insomnia, Ambien was helpful before. Home BP this am 140/73. No other problem. Will restart Valsartan first, wants to use up Lisinopril supply, advised to first use up current Valsartan then can retry the Lisinopril, understand side effect of dry cough. Vertigo improved post sinus balloon procedure.  Echo 2017  CONCLUSIONS     1 - Mildly enlarged aortic root.     2 - Biatrial enlargement.     3 - Concentric hypertrophy.     4 - No wall motion abnormalities.     5 - Normal left ventricular systolic function (EF 55-60%).     6 - Mild aortic regurgitation.     7 - Mild mitral regurgitation.     8 - Indeterminate LV diastolic function.     9 - Normal right ventricular systolic function .     10 - The estimated PA systolic pressure is greater than 25 mmHg.     11 - Suggest some improvement in LVEF from Echo in 3/2016.     In 2018, report congestion, sinusitis, bronchitis since , used all kind of medications. Renal review on 1/10 showed significant progression of disease, Cr up to 4.3 from 3.3 just 3 months ago. eGFR down to 12. Home BP log reviewed 137 to 176 / 77 to 90, HR 65 to 79. Been taking an addition half of Valsaran 320 mg when SBP > 175 or BBP > 85, recall doing it for 3 times. No cardiac complaints, no CP nor SOB, been able to do all the farm work " without problem. ECG in 12/2017 - sinus bradycardia, rate 536 left axis, LVH with STT abnormalties.    in 5/2018, here for review, complaints of chronic fatigue but still able to take care of the farm without much problem. Lost 28 lbs with plant-based diet. Compliant with medications. Home /80. LDL in 4/2018 47.6. Dizziness improved over the past 2 days with time adjustment on taking the medications.    in 8/2018, no new problem, noted BP higher at home now >130 to 140 /70. Had brief hospitalization at [facility] in 6/2018 for PNA. On more of a plant diet due to diverticulosis. 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 Labs reviewed LDL 46.4, eGFR 7.1.  Cardiac work up 6/2018  Echo - Conclusion   · Mild-to-moderate regurgitation is present in the aortic valve..  · The left ventricle cavity is mildly dilated.  · Left atrium is moderately dilated.  · Tricuspid valve shows mild regurgitation.  · Left ventricle ejection fraction is mildly decreased at 46%  · Grade I (mild) left ventricular diastolic dysfunction consistent with impaired relaxation.  · LA pressure is normal.  · There is moderate aortic valve stenosis.  · RA cavity is moderately dilated.  · RV systolic function is mildly reduced.  · Normal central venous pressure (3 mm Hg).     Lexiscan Conclusion   · Arrhythmias during stress: rare PACs, rare PVCs.  · There is a left ventricular function defect present in the inferoseptal location(s).  · There is a left ventricular perfusion defect that is small to moderate in size with moderate reduction in uptake present in the apical to basal anterior location(s) that is predominantly fixed.  · There is a left ventricular perfusion defect that is small in size with moderate reduction in uptake present in the inferior location(s) that is predominantly fixed.  · There is a left ventricular function defect present in the inferoseptal location(s).  · There is evidence of transient ischemic dilation (TID).  "TID was appreciated visually and quantitatively.  · No symptoms reported     HPI Comments: in 11/2018, post hospitalization, breathing fine, use inhaler as needed. No heart concern.   DCS - "77 yo male with PMHx of CKD stage 5, CAD, HTN, BPH, and HLD.  He was admitted to the service of hospital medicine with HCAP.  He presented to the ED with a one day onset of fever and cough.  Symptoms were associated with fatigue, nausea, wheezing and hemoptysis. He stated that he was recently admitted for a stomach virus about three weeks ago.  He denied chest pain, sob, abdominal pain, vomiting, diarrhea, dysuria and leg swelling.     Hospital Course:   Patient monitored closely during hospitalization. Telemetry ordered. He did have a short run of Vtach and was asymptomatic. Cardiology consulted. He was initiated on IV antibiotics for pneumonia. Pulmonary consulted. He received oral steroids during admission. Nephrology consulted for CKD stage V management. Patient encouraged to initiated HD, however is not open to starting this admission, He wishes to follow up with Dr. Bishop. He was noted to have diastolic HF and received Lasix 60 mg IV with good response. Dyspnea improved. ECHO obtained. Labs monitored. Procalcitonin negative. He is feeling better and is stable for DC from pulmonary, nephrology, and cardiac standpoint."    My note - Telemetry reviewed, have frequent PVC but no NSVT, just artifacts, likely due to motion / coughing.    Review of Systems      Constitutional: Some chills, no fevers, and sweats and no real fatigue, less head congestion uses mask outdoor, weight gain 15 lbs since 8/2018  Eyes: negative for icterus, redness and visual disturbance  Respiratory: negative for asthma, minimal dry cough with seasonal sinusitis, no dyspnea on exertion, no wheezing, no hemoptysis, pleurisy/chest pain and wheezing, Woodland score 0 now 5  Cardiovascular: no further chest pain, chest pressure/discomfort, dyspnea, " "near-syncope, no further nightly palpitations with less occasional /90, no paroxysmal nocturnal dyspnea and syncope  Gastrointestinal: negative for abdominal pain, nausea and vomiting, no further heart burn on exertion, BM more frequent.  Musculoskeletal:  do have muscle cramp in the left leg post op receiving injection from Dr. Whitehead, now uses Tums as needed, very effective, have arthritis, OA multiple joints.  Neurological: negative for coordination problems, some dizziness after medications, takes in interval, no gait problems, less headaches, likely sinus, no paresthesia, tremors and vertigo, sleeping 8 hours nightly.  Psych: no depression nor anxious, close sister (71 year old) passed in [place] 1/2016.    Objective:    Physical Exam     Constitutional: He appears healthy. No distress.   HENT:   Mouth/Throat: Dentition is normal.   Eyes: Conjunctivae are normal.   Neck: Thyroid normal. Neck supple. Circumference 15.5"  Cardiovascular: Normal rate, regular rhythm and normal pulses. PMI is not displaced. Exam reveals no gallop.   Medium-pitched machinery crescendo-decrescendo early systolic murmur is present with a grade of 3/6 at the upper right sternal border, upper left sternal border and apex   Pulses:   Carotid pulses are 2+ on the right side, and 2+ on the left side.   Dorsalis pedis pulses are 2+ on the right side, and 2+ on the left side.   Pulmonary/Chest: Breath sounds normal. He exhibits no tenderness.   Abdominal: Soft, very active bowel sounds. Waist 46" down to 42.5"  Extremities: no pitting edema around the sock line.   Neurological: He is alert and oriented to person, place, and time. Gait normal.   Skin: Skin is warm and dry. No cyanosis. No pallor. Nails show no clubbing.     Assessment:       1. Coronary artery disease involving native coronary artery of native heart without angina pectoris    2. Hypertensive left ventricular hypertrophy, without heart failure    3. Pure " hypercholesterolemia    4. LV dysfunction, EF 50%, reduced to 34%, now 46%    5. 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, not on chronic dialysis         Plan:   Micheal was seen today for follow-up.    Diagnoses and associated orders for this visit:    Coronary artery disease involving native coronary artery of native heart without angina pectoris    Hypertensive left ventricular hypertrophy, without heart failure    Pure hypercholesterolemia    LV dysfunction, EF 50%, reduced to 34%, now 46%    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, not on chronic dialysis    - All medical issue review, continue current Rx.  - CV status stable, continue current Rx, all medications reviewed, patient acknowledge good understanding.  - Need close follow up with Dr. Rojo in regard to BP Rx   Instruction for Mediterranean diet and heart healthy exercise given.  - Weigh twice weekly, try to lose 1-2 lbs per week  - Belly exercise daily  - Follow up in 3 months per patient's preference    Chronic fatigue - improved    Benign non-nodular prostatic hyperplasia without lower urinary tract symptoms  -     Change terazosin (HYTRIN) 10 MG capsule; Take 2 capsules (20 mg total) by mouth every evening.  Dispense: 180 capsule; Refill: 3    - Check home blood pressure, 2 days weekly, do 2 readings within 5 minutes in AM and PM, keep log for review.    Proteinuria    Diastolic dysfunction    ARMAS (dyspnea on exertion) - improved    Abdominal obesity    OA, post left TKR      Patient Active Problem List   Diagnosis    Osteoarthritis of right knee    Nocturia    Hematuria    Carotid stenosis    Essential hypertension    Hyperlipidemia, baseline     CAD (coronary artery disease), 2V CABG 2007    Gout, arthritis    Dizziness    LVH (left ventricular hypertrophy) due to hypertensive disease    Abnormal cardiovascular stress test    GERD (gastroesophageal reflux disease)    Elevated PSA    Acquired hammer toe    Diastolic  dysfunction    Abdominal obesity    Back pain, chronic    Glucose intolerance (impaired glucose tolerance)    Anemia of chronic disease    Gastric nodule    Diverticulitis, 2005, 2015    Personal history of colonic polyps    PSA elevation    DDD (degenerative disc disease), lumbar    LV dysfunction, EF 50%, reduced to 34%, now 46%    Other spondylosis, lumbar region    Knee pain    Benign prostatic hyperplasia without lower urinary tract symptoms    Itching, both legs, onset 7/2017    Chronic sinusitis    Mild persistent asthma without complication    Sigmoid diverticulitis, 3 episodes, 2005, 2015, 2018    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, not on chronic dialysis    Thrombocytopenia    Renal cyst    Unilateral inguinal hernia    Chronic kidney disease, stage 5    Generalized abdominal pain    Aortic stenosis    Symptomatic bradycardia    Secondary hyperparathyroidism    Erythropoietin (EPO) stimulating agent anemia management patient    Enteritis    CRF (chronic renal failure), stage 5    HCAP (healthcare-associated pneumonia)    Immunoglobulin deficiency    Hyponatremia    Hypocalcemia    Mild malnutrition    Mild asthma with acute exacerbation    Acute on chronic combined systolic and diastolic heart failure    Iron deficiency anemia     Total face-to-face time with the patient was 20 minutes and greater than 50% was spent in counseling and coordination of care. The above assessment and plan have been discussed at length. Labs and procedure reviewed. Problem List updated. Asked to bring in all active medications / pills bottles with next visit.

## 2018-11-15 NOTE — ED PROVIDER NOTES
Encounter Date: 10/17/2018       History     Chief Complaint   Patient presents with    Fall     pt reports tripping on wire and falling forward striking face and nose on concrete.     78-year-old male presents status post mechanical fall with facial trauma, neck pain.  He reports he was at a local golf cart shop - where while walking about he suffered a mechanical fall said to be due to tripping upon some wires.  He was unable to guard on his fall and he struck his face on the ground.  Who he has facial trauma to include the nose and the left supra oral facial skin  He complains of pain at the neck - but denies loss of function of the upper extremities reporting normal strength and sensation.  Denies loss of consciousness  Denies chest pain shortness of breath  Denies abdominal pelvic pain  Denies extremity pain  He is interviewed and examined in room 2 of the emergency department where he is awake alert with a GCS of 15              Review of patient's allergies indicates:   Allergen Reactions    Ace inhibitors Other (See Comments)     Cough    Arb-angiotensin receptor antagonist Itching    Eplerenone Other (See Comments)     Marked bradycardia, 40, tiredness and weakness      Sulfa (sulfonamide antibiotics) Itching     Patient says this was 10 years ago and doesn't remember what happened     Past Medical History:   Diagnosis Date    NICK (acute kidney injury) 7/29/2016    Allergy     Anemia, mild 12/15/2014    Arthritis     Gout    Benign essential HTN 3/27/2012    BMI 29.0-29.9,adult 5/10/2018    BPH (benign prostatic hyperplasia)     BPH (benign prostatic hyperplasia)     CAD (coronary artery disease) 2006    Chronic kidney disease     due to ibuprofen    Colon polyp     CRF (chronic renal failure), stage 5     Diverticulosis     Gastritis     GERD (gastroesophageal reflux disease)     Gout     History of colon polyps 5/3/2018    HTN (hypertension) 3/27/2012    Hyperlipidemia      Hyperlipidemia     LLL pneumonia 6/14/2018    LVH (left ventricular hypertrophy)     Mesenteric ischemia     Murmur, cardiac 3/27/2012    GRICELDA (obstructive sleep apnea)     DOES NOT USE A MACHINE    Sinus problem     Syncope and collapse      Past Surgical History:   Procedure Laterality Date    APPENDECTOMY      BLOCK-NERVE Left 5/31/2016    [facility]    CARDIAC CATHETERIZATION      COLONOSCOPY  2011    COLONOSCOPY N/A 5/3/2018    Procedure: COLONOSCOPY;  Surgeon: Messi Harris MD;  Location: [facility];  Service: Endoscopy;  Laterality: N/A;    COLONOSCOPY N/A 5/3/2018    [facility]    COLONOSCOPY N/A 9/10/2015    [facility]    CORONARY ARTERY BYPASS GRAFT  4/2007    x 1    CYSTOSCOPY N/A 8/30/2017    [facility]    CYSTOSCOPY N/A 11/10/2015    [facility]    ESOPHAGOGASTRODUODENOSCOPY (EGD) N/A 1/4/2016    Performed [facility] Tra [facility] at [facility] (2ND F[facility])    ESOPHAGOGASTRODUODENOSCOPY (EGD) N/A 10/15/2014    [facility] at [facility] ENDO    INJECTION-STEROID-EPIDURAL-TRANSFORAMINAL Left 2/25/2016    [facility]    JOINT REPLACEMENT      left knee total replacement  X 3    mid leftt finger      from a cactuss    MOLE REMOVAL  2016    RADIOFREQUENCY THERMOCOAGULATION (RFTC)-NERVE-MEDIAN BRANCH-LUMBAR Left 4/11/2016    [facility] Loen [facility] at [facility]    rotative cuff      no rotative cuffs on bilat shoulders has pins     SHOULDER SURGERY      shoulder surgery bilat  RIGHT X 4; LEFT X 3    TRANSRECTAL ULTRASOUND GUIDED PROSTATE BIOPSY Bilateral 11/10/2015    [facility]    ULTRASOUND-ENDOSCOPIC-UPPER N/A 5/31/2017    Per[facility] at [facility] (2ND FLR)    ULTRASOUND-ENDOSCOPIC-UPPER N/A 1/4/2016    Performed by  Tra [facility] at [facility] ENDO (2ND FLR)    ULTRASOUND-ENDOSCOPIC-UPPER N/A 2015    Performed by Jason Saleem MD at [facility] ENDO (2ND FLR)     Family History   Problem Relation Age of Onset    Heart disease Mother     Sudden death Father     Cancer Father         advanced lung ca- found after 2 story fall    Stroke Sister     Pneumonia Sister          from PNA    Heart disease Sister     Hypertension Brother     Kidney cancer Neg Hx     Prostate cancer Neg Hx     Urolithiasis Neg Hx     Allergic rhinitis Neg Hx     Allergies Neg Hx     Angioedema Neg Hx     Asthma Neg Hx     Atopy Neg Hx     Eczema Neg Hx     Immunodeficiency Neg Hx     Rhinitis Neg Hx     Urticaria Neg Hx      Social History     Tobacco Use    Smoking status: Never Smoker    Smokeless tobacco: Never Used   Substance Use Topics    Alcohol use: No    Drug use: No     Review of Systems   Constitutional: Negative.    HENT: Positive for facial swelling ( current injury) and nosebleeds ( current injury).    Eyes: Negative for visual disturbance.   Respiratory: Negative.    Cardiovascular: Negative.    Gastrointestinal: Negative.    Musculoskeletal: Positive for neck pain and neck stiffness. Negative for arthralgias, back pain, gait problem, joint swelling and myalgias.   Skin: Positive for wound ( nasal and lip upper). Negative for color change, pallor and rash.   Neurological: Positive for headaches. Negative for dizziness, tremors, seizures, syncope, facial asymmetry, speech difficulty, weakness, light-headedness and numbness.   Hematological: Negative.    Psychiatric/Behavioral: Negative for confusion.   All other systems reviewed and are negative.      Physical Exam     Initial Vitals [10/17/18 1321]   BP Pulse Resp Temp SpO2   (!) 157/95 83 18 -- 99 %      MAP       --         Physical Exam    Nursing note and vitals reviewed.  Constitutional: Vital signs are normal. He appears well-developed and well-nourished. He  is not diaphoretic. No distress.   HENT:   Head: Normocephalic.       Nose: Rhinorrhea, sinus tenderness and nasal deformity (min) present. No mucosal edema, nose lacerations, septal deviation or nasal septal hematoma. Epistaxis is observed.  No foreign bodies.       Mouth/Throat: Uvula is midline, oropharynx is clear and moist and mucous membranes are normal. No oral lesions. No uvula swelling or lacerations.   No intraoral laceration       Eyes: Conjunctivae and EOM are normal. Pupils are equal, round, and reactive to light.   Neck: Spinous process tenderness and muscular tenderness present. No neck rigidity. Carotid bruit is not present. No JVD present.   Cardiovascular: Normal rate, regular rhythm, normal heart sounds and intact distal pulses.  No extrasystoles are present.  Exam reveals no gallop and no friction rub.    No murmur heard.  Pulses:       Radial pulses are 2+ on the right side, and 2+ on the left side.   Pulmonary/Chest: Breath sounds normal. No respiratory distress. He has no decreased breath sounds. He has no wheezes. He has no rhonchi. He has no rales. He exhibits no tenderness.   Abdominal: Soft. Bowel sounds are normal. He exhibits no distension and no mass. There is no tenderness. There is no rebound and no guarding.   Musculoskeletal: Normal range of motion. He exhibits no edema or tenderness.   Neurological: He is alert and oriented to person, place, and time. He has normal strength. No cranial nerve deficit or sensory deficit. GCS score is 15. GCS eye subscore is 4. GCS verbal subscore is 5. GCS motor subscore is 6.   Skin: Skin is warm and dry. Capillary refill takes less than 2 seconds. No rash noted. No erythema. No pallor.   Psychiatric: He has a normal mood and affect. His behavior is normal. Judgment and thought content normal.         ED Course   Lac Repair  Date/Time: 10/17/2018 2:30 PM  Performed by: Ryder Garner MD  Authorized by: Ryder Garner MD   Consent Done: Not  Needed  Body area: head/neck  Location details: nose  Laceration length: 0.8 cm  Foreign bodies: no foreign bodies  Tendon involvement: none  Nerve involvement: none  Vascular damage: no  Anesthesia: local infiltration    Anesthesia:  Local Anesthetic: lidocaine 1% without epinephrine  Anesthetic total: 0.5 mL  Patient sedated: no  Preparation: Patient was prepped and draped in the usual sterile fashion.  Irrigation solution: saline  Irrigation method: syringe  Amount of cleaning: standard  Debridement: none  Degree of undermining: none  Skin closure: 5-0 Prolene  Number of sutures: 2  Technique: simple  Approximation: close  Approximation difficulty: simple  Patient tolerance: Patient tolerated the procedure well with no immediate complications    Lac Repair  Date/Time: 10/17/2018 2:30 PM  Performed by: Ryder Garner MD  Authorized by: Ryder Garner MD   Body area: head/neck (perioral / left supra oral facial )  Laceration length: 0.5 cm  Foreign bodies: no foreign bodies  Tendon involvement: none  Nerve involvement: none  Vascular damage: no  Anesthesia: local infiltration    Anesthesia:  Local Anesthetic: lidocaine 1% without epinephrine  Anesthetic total: 0.25 mL  Patient sedated: no  Preparation: Patient was prepped and draped in the usual sterile fashion.  Irrigation solution: saline  Irrigation method: syringe  Amount of cleaning: standard  Debridement: none  Degree of undermining: none  Wound skin closure material used: 5-0 Rapide   Number of sutures: 2  Technique: simple  Approximation: close  Approximation difficulty: simple  Patient tolerance: Patient tolerated the procedure well with no immediate complications        Labs Reviewed - No data to display      Results for orders placed or performed in visit on 10/12/18   Urine culture   Result Value Ref Range    Urine Culture, Routine No growth    POCT URINE DIPSTICK WITHOUT MICROSCOPE   Result Value Ref Range    Color, UA yellow     Spec Grav UA 1.010      pH, UA 6     WBC, UA neg     Nitrite, UA neg     Protein 100     Glucose, UA neg     Ketones, UA neg     Urobilinogen, UA neg     Bilirubin neg     Blood, UA 1+      *Note: Due to a large number of results and/or encounters for the requested time period, some results have not been displayed. A complete set of results can be found in Results Review.         Imaging Results          X-Ray Cervical Spine AP And Lateral (Final result)  Result time 10/17/18 14:18:23    Final result by Huang Treadwell MD (10/17/18 14:18:23)                 Impression:      1. Partial body fusion of C5-C6.  2. Moderate to severe multilevel degenerative disc disease with large bridging anterior osteophytosis.  3. No significant prevertebral soft tissue swelling.      Electronically signed by: Huang Treadwell  Date:    10/17/2018  Time:    14:18             Narrative:    EXAMINATION:  XR CERVICAL SPINE AP LATERAL    CLINICAL HISTORY:  Injury, unspecified, initial encounter    TECHNIQUE:  AP, lateral and open mouth views of the cervical spine were performed.    COMPARISON:  None.    FINDINGS:  Mild straightening normal cervical lordosis.  Cervical vertebral bodies otherwise normal in height and alignment.  No acute fracture.  Partial interbody fusion of C5-C6.    No significant prevertebral soft tissue swelling.    There is moderate to severe multilevel degenerative disc disease with large bridging anterior osteophytosis.    Extensive bilateral calcified atherosclerotic change of the carotid arteries.                               CT Head Without Contrast (Final result)  Result time 10/17/18 13:57:22    Final result by Huang Treadwell MD (10/17/18 13:57:22)                 Impression:      1. Cortical atrophy with periventricular deep white matter change consistent with advanced chronic small vessel ischemic disease.  2. Small frontal cephalohematoma.  3. Comminuted nasal bone fracture.      Electronically signed by: Huang  Orange  Date:    10/17/2018  Time:    13:57             Narrative:    EXAMINATION:  CT HEAD WITHOUT CONTRAST    CLINICAL HISTORY:  Head trauma, minor, GCS>=13, NOC/NEXUS/CCR neg, first study;    TECHNIQUE:  Low dose axial images were obtained through the head.  Coronal and sagittal reformations were also performed. Contrast was not administered.    COMPARISON:  None.    FINDINGS:  There is no acute hemorrhage or infarction.  There is cortical atrophy.  There are periventricular deep white matter changes consistent with advanced chronic small vessel ischemic disease.    No extra-axial fluid collections.  Ventricles are normal in size, shape and configuration.  The basal cisterns are patent.    The imaged paranasal sinuses and ethmoid air cells are well aerated.  The mastoid air cells and middle ears are normally pneumatized.    Small frontal cephalohematoma.  Comminuted nasal bone fracture with associated soft tissue swelling.                                 Medical Decision Making:   Initial Assessment:   Facial trauma - nasal laceration, perioral laceration.  Nondisplaced nasal fracture closed.  Cervical spine pain with underlying DJD.      No evidence of acute intracranial trauma  No evidence of spinal cord syndrome      Wound care as per procedural notes    Patient stable for discharge as per AVS and understands plan of care to include wound care and suture removal  Clinical Tests:   Lab Tests: Ordered and Reviewed  Radiological Study: Ordered and Reviewed  Stable to discharge as per AVS      Percocet 1-2 every 6 hr as needed for acute pain  Prednisone 40 mg daily x5 days  Keflex 500 mg 2 tablets twice daily x5 days  Basic wound care twice daily  Nasal laceration has 2 nonabsorbable sutures that need removed in 5-7 days   Upper lip laceration was closed with an absorbable suture  Follow-up ear nose and throat physician  Have sutures removed either by ENT or return to the emergency department for removal                       Clinical Impression:   The primary encounter diagnosis was Closed displaced fracture of nasal bone, initial encounter. Diagnoses of Neck pain, Injury, Facial laceration, initial encounter, Contusion of forehead, initial encounter, and Spondylosis of cervical region without myelopathy or radiculopathy were also pertinent to this visit.      Disposition:   Disposition: Discharged  Condition: Stable                        Ryder Garner MD  11/15/18 0737

## 2018-11-15 NOTE — PROGRESS NOTES
CC : Hospital follow up     Micheal Hunt is a 78 y.o.   Pt recently admitted for pneumonia   Pt is here for evaluation of anemia with CKD.  He has received  procrit in the past  His labs reveal continued anemia with a hemoglobin less than 10    He was anemia in the hospital     he has been experiencing shortness of breath  He has received 20,000 u of procrit in past and is here to check his Hb  AV graft in left arm : He is being set up for home dialysis    Pt feels weak and tired, He has not started dialysis yet at home  He is seeing Dr Mihir goodman in follow up   I have reviewed records from the last hospitalization     Past Medical History:   Diagnosis Date    NICK (acute kidney injury) 7/29/2016    Allergy     Anemia, mild 12/15/2014    Arthritis     Gout    Benign essential HTN 3/27/2012    BMI 29.0-29.9,adult 5/10/2018    BPH (benign prostatic hyperplasia)     BPH (benign prostatic hyperplasia)     CAD (coronary artery disease) 2006    Chronic kidney disease     due to ibuprofen    Colon polyp     CRF (chronic renal failure), stage 5     Diverticulosis     Gastritis     GERD (gastroesophageal reflux disease)     Gout     History of colon polyps 5/3/2018    HTN (hypertension) 3/27/2012    Hyperlipidemia     Hyperlipidemia     LLL pneumonia 6/14/2018    LVH (left ventricular hypertrophy)     Mesenteric ischemia     Murmur, cardiac 3/27/2012    GRICELDA (obstructive sleep apnea)     DOES NOT USE A MACHINE    Sinus problem     Syncope and collapse      He is tolerating norvasc for HTN  He is tolerating proscar which is continuing to help with nocturia     Current Outpatient Medications:     albuterol 90 mcg/actuation inhaler, 2 puffs every 4 hours as needed for cough, wheeze, or shortness of breath, Disp: 3 Inhaler, Rfl: 3    albuterol-ipratropium (DUO-NEB) 2.5 mg-0.5 mg/3 mL nebulizer solution, INHALE 1 VIAL BY NEBULIZER EVERY 6 HOURS AS NEEDED FOR WHEEZING, Disp: 270 mL, Rfl: 0     allopurinol (ZYLOPRIM) 100 MG tablet, Take 1 tablet (100 mg total) by mouth once daily., Disp: 90 tablet, Rfl: 1    amLODIPine (NORVASC) 10 MG tablet, Take 1 tablet (10 mg total) by mouth every evening., Disp: 90 tablet, Rfl: 1    aspirin (ECOTRIN) 81 MG EC tablet, Take 81 mg by mouth once daily.  , Disp: , Rfl:     atorvastatin (LIPITOR) 80 MG tablet, TAKE 1 TABLET (80 MG TOTAL) BY MOUTH ONCE DAILY., Disp: 90 tablet, Rfl: 3    carvedilol (COREG) 6.25 MG tablet, Take 1 tablet (6.25 mg total) by mouth 2 (two) times daily with meals., Disp: 60 tablet, Rfl: 3    ciprofloxacin HCl (CIPRO) 500 MG tablet, Take 1 tablet (500 mg total) by mouth 2 (two) times daily. for 7 days, Disp: 14 tablet, Rfl: 0    COLCRYS 0.6 mg tablet, TAKE 1 TABLET (0.6 MG TOTAL) BY MOUTH ONCE DAILY., Disp: 30 tablet, Rfl: 1    cyclobenzaprine (FLEXERIL) 5 MG tablet, One tab po with breakfast and lunch take 2 tabs po before bedtime for up to 7 days, Disp: 30 tablet, Rfl: 0    finasteride (PROSCAR) 5 mg tablet, TAKE 1 TABLET ONCE DAILY., Disp: 90 tablet, Rfl: 3    fluticasone (FLONASE) 50 mcg/actuation nasal spray, 2 sprays (100 mcg total) by Each Nare route once daily. (Patient taking differently: 2 sprays by Each Nare route daily as needed. ), Disp: 3 Bottle, Rfl: 3    gabapentin (NEURONTIN) 300 MG capsule, Take 1 capsule (300 mg total) by mouth every evening., Disp: 90 capsule, Rfl: 3    iron polysaccharides (NIFEREX) 150 mg iron Cap, Take 1 capsule (150 mg total) by mouth 2 (two) times daily before meals., Disp: 60 capsule, Rfl: 11    isosorbide mononitrate (ISMO,MONOKET) 10 mg tablet, TAKE 1 TABLET BY MOUTH EVERY DAY IN THE EVENING, Disp: 30 tablet, Rfl: 3    losartan (COZAAR) 100 MG tablet, Take 1 tablet (100 mg total) by mouth once daily., Disp: 90 tablet, Rfl: 0    meclizine (ANTIVERT) 25 mg tablet, TAKE 1 TABLET (25 MG TOTAL) BY MOUTH 3 (THREE) TIMES DAILY AS NEEDED., Disp: 90 tablet, Rfl: 0    mirtazapine (REMERON) 7.5 MG  Tab, Take 1 tablet (7.5 mg total) by mouth every evening., Disp: 30 tablet, Rfl: 2    NITROSTAT 0.4 mg SL tablet, PLACE 1 TABLET (0.4 MG TOTAL) UNDER THE TONGUE EVERY 5 (FIVE) MINUTES AS NEEDED FOR CHEST PAIN., Disp: 25 tablet, Rfl: 3    omeprazole (PRILOSEC) 20 MG capsule, Take 1 capsule (20 mg total) by mouth once daily., Disp: 90 capsule, Rfl: 1    polyethylene glycol (GLYCOLAX) 17 gram/dose powder, Take 17 g by mouth 2 (two) times daily before meals., Disp: 1 Bottle, Rfl: 4    sodium bicarbonate 650 MG tablet, Take 1 tablet (650 mg total) by mouth 3 (three) times daily., Disp: 90 tablet, Rfl: 0    sodium chloride (SALINE NASAL MIST) 3 % Mist, 1 spray by Nasal route 2 (two) times daily., Disp: , Rfl:     tamsulosin (FLOMAX) 0.4 mg Cap, Take 1 capsule (0.4 mg total) by mouth once daily., Disp: 30 capsule, Rfl: 2    torsemide (DEMADEX) 20 MG Tab, Take 1 tablet (20 mg total) by mouth once daily., Disp: 30 tablet, Rfl: 0    traMADol (ULTRAM) 50 mg tablet, Take 1 tablet (50 mg total) by mouth every 12 (twelve) hours as needed for Pain., Disp: 60 tablet, Rfl: 2    vitamin renal formula, B-complex-vitamin c-folic acid, (NEPHROCAP) 1 mg Cap, Take 1 capsule by mouth once daily., Disp: 30 capsule, Rfl: 0     He is tolerating Diovan for hypertension continues taking Ambien to help with insomnia    CONSTITUTIONAL: No fevers, chills, night sweats, + fatigue   SKIN: no rashes or itching  ENT: No headaches, head trauma, vision changes, or eye pain  LYMPH NODES: None noticed   CV: No chest pain, palpitations.   RESP:+ dyspnea on exertion,no cough, wheezing, or hemoptysis  GI: No nausea, emesis, diarrhea, constipation, melena, hematochezia, pain.   : No dysuria, hematuria, urgency, or frequency   HEME: No easy bruising, bleeding problems  PSYCHIATRIC: No depression, anxiety, hallucinations.  NEURO: No seizures, memory loss, Intermittent weakness  No difficulty sleeping  MSK: No arthralgias or joint  "swelling    Depression screening negative        /61   Pulse (!) 58   Temp 97.6 °F (36.4 °C)   Resp 20   Ht 6' 1" (1.854 m)   Wt 103.6 kg (228 lb 6.3 oz)   BMI 30.13 kg/m²   Constitutional: oriented to person, place, and time.    HENT:   Head: bilateral bruises periorbital , cut over his lip   Right Ear: External ear normal.   Left Ear: External ear normal.   Nose: Nose normal.   Mouth/Throat: Oropharynx is clear and moist.   Eyes: Conjunctivae and EOM are normal. Pupils are equal, round  Neck: Normal range of motion.No thyromegaly present.   Cardiovascular: Normal rate, regular rhythm, normal heart sounds and intact distal pulses.    ++ murmur heard.  Pulmonary/Chest: Decreased breath sounds base   no wheezes.  no rales.   Abdominal: Soft. Bowel sounds are normal.  no mass.There is no rebound.   Musculoskeletal:   no edema  + limited ROM and swelling of left wrist tenderness.   Lymphadenopathy:    no cervical adenopathy.   Neurological: alert and oriented to person, place, and time.   Skin: Skin is warm and dry. No rash noted.   Psychiatric: normal mood and affect.   behavior is normal.   Vitals reviewed.    Lab Results   Component Value Date    WBC 4.90 11/14/2018    HGB 8.6 (L) 11/14/2018    HCT 25.3 (L) 11/14/2018    MCV 90 11/14/2018     11/14/2018     CMP  Sodium   Date Value Ref Range Status   11/14/2018 140 136 - 145 mmol/L Final     Potassium   Date Value Ref Range Status   11/14/2018 4.3 3.5 - 5.1 mmol/L Final     Chloride   Date Value Ref Range Status   11/14/2018 108 95 - 110 mmol/L Final     CO2   Date Value Ref Range Status   11/14/2018 20 (L) 23 - 29 mmol/L Final     Glucose   Date Value Ref Range Status   11/14/2018 116 (H) 70 - 110 mg/dL Final     BUN, Bld   Date Value Ref Range Status   11/14/2018 79 (H) 8 - 23 mg/dL Final     Creatinine   Date Value Ref Range Status   11/14/2018 5.5 (H) 0.5 - 1.4 mg/dL Final   08/08/2013 1.5 (H) 0.5 - 1.4 mg/dL Final     Calcium   Date Value Ref " Range Status   11/14/2018 8.0 (L) 8.7 - 10.5 mg/dL Final   08/08/2013 9.0 8.7 - 10.5 mg/dL Final     Total Protein   Date Value Ref Range Status   11/14/2018 6.1 6.0 - 8.4 g/dL Final     Albumin   Date Value Ref Range Status   11/14/2018 3.3 (L) 3.5 - 5.2 g/dL Final   09/12/2013 4.3 3.6 - 5.1 g/dL Final     Comment:     @ Test Performed By:  [employer] [facility]  JACK Hernandez M.D.,   [address] [place] [id number]  [facility] #[id number]     Total Bilirubin   Date Value Ref Range Status   11/14/2018 0.3 0.1 - 1.0 mg/dL Final     Comment:     For infants and newborns, interpretation of results should be based  on gestational age, weight and in agreement with clinical  observations.  Premature Infant recommended reference ranges:  Up to 24 hours.............<8.0 mg/dL  Up to 48 hours............<12.0 mg/dL  3-5 days..................<15.0 mg/dL  6-29 days.................<15.0 mg/dL       Alkaline Phosphatase   Date Value Ref Range Status   11/14/2018 72 55 - 135 U/L Final     AST   Date Value Ref Range Status   11/14/2018 21 10 - 40 U/L Final     ALT   Date Value Ref Range Status   11/14/2018 36 10 - 44 U/L Final     Anion Gap   Date Value Ref Range Status   11/14/2018 12 8 - 16 mmol/L Final   08/08/2013 11 5 - 15 meq/L Final     eGFR if    Date Value Ref Range Status   11/14/2018 10 (A) >60 mL/min/1.73 m^2 Final     eGFR if non    Date Value Ref Range Status   11/14/2018 9 (A) >60 mL/min/1.73 m^2 Final     Comment:     Calculation used to obtain the estimated glomerular filtration  rate (eGFR) is the CKD-EPI equation.            Erythropoietin (EPO) stimulating agent anemia management patient  -     CBC auto differential; Future; Expected date: 11/15/2018  -     CMP; Future; Expected date: 11/15/2018  -     IGG 1, 2, 3, AND 4; Future; Expected date: 11/15/2018    Iron deficiency anemia, unspecified iron deficiency anemia type  -     CBC  auto differential; Future; Expected date: 11/15/2018  -     CMP; Future; Expected date: 11/15/2018  -     IGG 1, 2, 3, AND 4; Future; Expected date: 11/15/2018    Anemia of chronic disease  -     CBC auto differential; Future; Expected date: 11/15/2018  -     CMP; Future; Expected date: 11/15/2018  -     IGG 1, 2, 3, AND 4; Future; Expected date: 11/15/2018    Immunoglobulin deficiency  -     CBC auto differential; Future; Expected date: 11/15/2018  -     CMP; Future; Expected date: 11/15/2018  -     IGG 1, 2, 3, AND 4; Future; Expected date: 11/15/2018    Chronic kidney disease, stage 5    Gastroesophageal reflux disease with esophagitis    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, not on chronic dialysis    History of recent pneumonia    Other orders  -     sodium chloride 0.9% 100 mL flush bag  -     sodium chloride 0.9% flush 10 mL  -     heparin, porcine (PF) 100 unit/mL injection flush 500 Units  -     alteplase injection 2 mg  -     ferric carboxymaltose (INJECTAFER) 750 mg in sodium chloride 0.9% 250 mL infusion  -     sodium chloride 0.9% 100 mL flush bag  -     sodium chloride 0.9% flush 10 mL  -     heparin, porcine (PF) 100 unit/mL injection flush 500 Units  -     alteplase injection 2 mg  -     ferric carboxymaltose (INJECTAFER) 750 mg in sodium chloride 0.9% 250 mL infusion        Not on dialysis    To start dialysis at home when neph states he is ready for such   Symptomatic anemia with weakness and SOB  He needs procrit and IV iron as well   RTC 4 weeks after such for re evaluation  See Dr Guillen for monitoring of pneumonia   He is to continue usual medications for hypertension being calcium channel blocker  Continue Neurontin for intermittent paresthesias   If pneumonia does not resolve he may need IVIG for low hypogammaglobulinemia     Thank you for allowing me to evaluate and participate in the care of this pleasant patient. Please fell free to call me with any questions or  concerns.    [place]armly,  Mikala Wells MD    This note was dictated with Dragon and briefly proofread. Please excuse any sentences that may be unclear or words misspelled

## 2018-11-15 NOTE — TELEPHONE ENCOUNTER
Called and spoke with Mr. Burton, I advised him I would call Dr. Bae's office to find out what paperwork they need filled out by Dr. Dunlap. He verbalized understanding. No further issues noted.

## 2018-11-16 ENCOUNTER — DOCUMENTATION ONLY (OUTPATIENT)
Dept: FAMILY MEDICINE | Facility: CLINIC | Age: 78
End: 2018-11-16

## 2018-11-16 ENCOUNTER — OFFICE VISIT (OUTPATIENT)
Dept: FAMILY MEDICINE | Facility: CLINIC | Age: 78
End: 2018-11-16
Payer: MEDICARE

## 2018-11-16 VITALS
WEIGHT: 222.69 LBS | SYSTOLIC BLOOD PRESSURE: 138 MMHG | HEART RATE: 87 BPM | DIASTOLIC BLOOD PRESSURE: 76 MMHG | HEIGHT: 73 IN | RESPIRATION RATE: 16 BRPM | BODY MASS INDEX: 29.51 KG/M2 | TEMPERATURE: 99 F | OXYGEN SATURATION: 98 %

## 2018-11-16 DIAGNOSIS — R60.0 BILATERAL LEG EDEMA: Primary | ICD-10-CM
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
## 2018-11-16 DIAGNOSIS — J18.9 HOSPITAL-ACQUIRED PNEUMONIA: ICD-10-CM

## 2018-11-16 PROCEDURE — 99214 OFFICE O/P EST MOD 30 MIN: CPT | Mod: S$GLB,,, | Performed by: INTERNAL MEDICINE

## 2018-11-16 RX ORDER — TORSEMIDE 20 MG/1
40 TABLET ORAL DAILY
Qty: 60 TABLET | Refills: 5 | Status: SHIPPED | OUTPATIENT
Start: 2018-11-16 | End: 2019-01-15

## 2018-11-16 NOTE — PATIENT INSTRUCTIONS
Let us know in 3 days with the change in weight is.    Need to lose weight, 1 or 2 lbs a day.  If weight is going up let us know, or if its going down more than 5 lbs a day.

## 2018-11-16 NOTE — PROGRESS NOTES
"Subjective:       Patient ID: Micheal Hunt is a 78 y.o. male.    Chief Complaint: Pneumonia    HPI         CHIEF COMPLAINT: Cough(+).  HPI: hospitalized 1 wk ago with pneumonia.  On cipro, 250 qd. Orthopnea, weight gain present. About 20 lbs, he is unclear how much he weight at discharge maybe 208 lb. Hx of chf.  The patient has end-stage renal failure and has a shunt prefers peritoneal dialysis.  His it estimated GFR right now is 9 and he is having itching    ONSET/TIMING:     ago    DURATION:               Paroxysmal: no .    QUALITY/COURSE:. unchanged    INTENSITY/SEVERITY: Severity is #  2 (10 point scale)      The following symptoms/statements are positive if BOLD, negative otherwise.      CONTEXT/WHEN:  Tobacco_use. Smokers_in_home. Seasonal_pattern. Allergies/Hayfever. Sinusitis. Irritant_Exposure(smoke/dust/fumes). Exposure_to_others_with_similar_symptoms.        Similar_problems_in_past.   PAST TREATMENT OR EVALUATION:   previous PPD. Recent_previous_chest_x-ray. Recent_antibiotics.  Associated Symptoms:     sputum production: scant. copious. Hemoptysis.  Medical History: Past_pulmonary_infections.  Cardiovascular_disease.chronic_lung_disease.  tuberculosis. Asthma. AIDS. Gastroesophageal_reflux_disease .      Review of Systems      Objective:      Vitals:    11/16/18 1426   BP: 138/76   Pulse: 87   Resp: 16   Temp: 99.2 °F (37.3 °C)   TempSrc: Oral   SpO2: 98%   Weight: 101 kg (222 lb 10.6 oz)   Height: 6' 1" (1.854 m)   PainSc: 10-Worst pain ever   PainLoc: Neck     Physical Exam   Constitutional: He appears well-developed and well-nourished.   Cardiovascular: Normal rate and regular rhythm.   Murmur (Systolic ejection murmur at the base) heard.  Pulmonary/Chest: Effort normal and breath sounds normal.   Abdominal: Soft. There is no tenderness.   Musculoskeletal: He exhibits edema (3+ bilaterally).   Neurological: He is alert.   Psychiatric: He has a normal mood and affect. His behavior is " normal. Thought content normal.   Nursing note and vitals reviewed.        Assessment:       1. Bilateral leg edema    2. Hospital-acquired pneumonia          Plan:   , pulmonary, has ordered a CT of the chest is follow-up for his pneumonia.  He will be seeing him next week.    Bilateral leg edema  -     torsemide (DEMADEX) 20 MG Tab; Take 2 tablets (40 mg total) by mouth once daily.  Dispense: 60 tablet; Refill: 5  -     Basic metabolic panel; Future; Expected date: 11/16/2018    Hospital-acquired pneumonia      Follow-up in about 6 weeks (around 12/28/2018).

## 2018-11-19 ENCOUNTER — PATIENT MESSAGE (OUTPATIENT)
Dept: FAMILY MEDICINE | Facility: CLINIC | Age: 78
End: 2018-11-19

## 2018-11-19 ENCOUNTER — HOSPITAL ENCOUNTER (OUTPATIENT)
Dept: RADIOLOGY | Facility: HOSPITAL | Age: 78
Discharge: HOME | End: 2018-11-19
Attending: INTERNAL MEDICINE
Payer: MEDICARE

## 2018-11-19 DIAGNOSIS — R93.89 ABNORMAL CT SCAN, CHEST: ICD-10-CM

## 2018-11-19 DIAGNOSIS — R04.2 HEMOPTYSIS: ICD-10-CM

## 2018-11-19 PROCEDURE — 71250 CT THORAX DX C-: CPT | Mod: 26,,, | Performed by: RADIOLOGY

## 2018-11-19 PROCEDURE — 71250 CT THORAX DX C-: CPT | Mod: TC

## 2018-11-21 ENCOUNTER — OFFICE VISIT (OUTPATIENT)
Dept: PULMONOLOGY | Facility: CLINIC | Age: 78
End: 2018-11-21
Payer: MEDICARE

## 2018-11-21 VITALS
WEIGHT: 215.19 LBS | HEIGHT: 73 IN | HEART RATE: 63 BPM | SYSTOLIC BLOOD PRESSURE: 118 MMHG | OXYGEN SATURATION: 97 % | BODY MASS INDEX: 28.52 KG/M2 | DIASTOLIC BLOOD PRESSURE: 63 MMHG

## 2018-11-21 DIAGNOSIS — J18.9 PNEUMONIA OF BOTH LUNGS DUE TO INFECTIOUS ORGANISM, UNSPECIFIED PART OF LUNG: ICD-10-CM

## 2018-11-21 DIAGNOSIS — D80.9 IMMUNOGLOBULIN DEFICIENCY: ICD-10-CM

## 2018-11-21 DIAGNOSIS — J45.901 EXACERBATION OF ASTHMA, UNSPECIFIED ASTHMA SEVERITY, UNSPECIFIED WHETHER PERSISTENT: ICD-10-CM

## 2018-11-21 DIAGNOSIS — J45.30 MILD PERSISTENT ASTHMA WITHOUT COMPLICATION: Primary | ICD-10-CM

## 2018-11-21 PROCEDURE — 99214 OFFICE O/P EST MOD 30 MIN: CPT | Mod: S$PBB,,, | Performed by: NURSE PRACTITIONER

## 2018-11-21 PROCEDURE — 99213 OFFICE O/P EST LOW 20 MIN: CPT | Mod: PBBFAC,PO | Performed by: NURSE PRACTITIONER

## 2018-11-21 PROCEDURE — 99999 PR PBB SHADOW E&M-EST. PATIENT-LVL III: CPT | Mod: PBBFAC,,, | Performed by: NURSE PRACTITIONER

## 2018-11-21 RX ORDER — ALBUTEROL SULFATE 90 UG/1
AEROSOL, METERED RESPIRATORY (INHALATION)
Qty: 3 INHALER | Refills: 3 | Status: SHIPPED | OUTPATIENT
Start: 2018-11-21 | End: 2019-10-11 | Stop reason: SDUPTHER

## 2018-11-21 RX ORDER — BUDESONIDE AND FORMOTEROL FUMARATE DIHYDRATE 160; 4.5 UG/1; UG/1
2 AEROSOL RESPIRATORY (INHALATION) EVERY 12 HOURS
Qty: 3 INHALER | Refills: 4 | Status: SHIPPED | OUTPATIENT
Start: 2018-11-21 | End: 2020-01-01 | Stop reason: ALTCHOICE

## 2018-11-21 RX ORDER — IPRATROPIUM BROMIDE AND ALBUTEROL SULFATE 2.5; .5 MG/3ML; MG/3ML
SOLUTION RESPIRATORY (INHALATION)
Qty: 270 ML | Refills: 0 | Status: SHIPPED | OUTPATIENT
Start: 2018-11-21 | End: 2019-02-26 | Stop reason: SDUPTHER

## 2018-11-21 RX ORDER — FLUTICASONE PROPIONATE 50 MCG
2 SPRAY, SUSPENSION (ML) NASAL DAILY
Qty: 3 BOTTLE | Refills: 3 | Status: ON HOLD | OUTPATIENT
Start: 2018-11-21 | End: 2020-01-01

## 2018-11-21 NOTE — PATIENT INSTRUCTIONS
Prevnar 23 immunization now can get at local pharmacy    Blood work 6 weeks at [facility] after immunization    Chest x-ray and chest CT Chest before next appointment    Take Symbicort inhaler 2 puff twice a day every day to prevent shortness of breath    Take antibiotic at first signs of infection yellow green mucous    Continue cipro as prescribed by Nephrologist due to kidney failure

## 2018-11-21 NOTE — PROGRESS NOTES
11/21/2018    Micheal Hunt  [facility] Follow Up: Pneumonia    Chief Complaint   Patient presents with    Hospital Follow Up    Medication Management     needs a few refills       HPI: 11/21/18 Admitted [facility] for Pneumonia discharged Admit Date: 11/5/2018-11/09/2018.   SOB improved since discharge, SOB onset few months Summer time, SOB and chest tightness with exertion walking, relieved with rest. Not currently taking symbicort inhaler or albuterol inhaler.   Currently on Cipro half dose due ESRD, on dialysis.      Previous HPI Dr. Guillen: 2/21/18- from [place] , worked [employer], lives [place] , horses/ranching.  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     Has yr round sinus problems - drainage ,  Clear to green,  abx help but avoids with renal dz.  DR Nails did sinus work - saw Dr Bartlett - penuomococcal titers were low, no vaccine     Lots of cough and h/o wheezes, not sob.  Has lung burning off and on  - burns when exerts - releif with rest.  Duration 5-10 min but stops with rest.     Sees Dr Dunlap,   Started 2010 when moved to [facility]- onset with chest burning while loading hay.      pft with low MVV, denies sob now nor muscle weakness.     Bell Wilkinson NP   HPI:   Micheal Hunt is a 79 yo male with PMHx of CKD stage 5, CAD, HTN, BPH, and HLD.  He was admitted to the service of hospital medicine with HCAP.  He presented to the ED with a one day onset of fever and cough.  Symptoms were associated with fatigue, nausea, wheezing and hemoptysis. He stated that he was recently admitted for a stomach virus about three weeks ago.  He denied chest pain, sob, abdominal pain, vomiting, diarrhea, dysuria and leg swelling.     * No surgery found *       Hospital Course:   Patient monitored closely during hospitalization. Telemetry ordered. He did have a short run of Vtach and was asymptomatic. Cardiology consulted. He was initiated on IV antibiotics for pneumonia. Pulmonary consulted. He received oral steroids  during admission. Nephrology consulted for CKD stage V management. Patient encouraged to initiated HD, however is not open to starting this admission, He wishes to follow up with Dr. Bishop. He was noted to have diastolic HF and received Lasix 60 mg IV with good response. Dyspnea improved. ECHO obtained. Labs monitored. Procalcitonin negative. He is feeling better and is stable for DC from pulmonary, nephrology, and cardiac standpoint.      PE; chest coarse. Heart RRR with Murmum LUE fistula      Mihir Guillen MD Physician Pulmonology   Nov 7, sl wheezes, feels better, ask for tramadol tid (renal failure max dose 200/d),  Follows dr Bishop,    Nov 8- feels better but had small amount brb hemoptysis with some chest pain this am. Smaller amount than 2 prior episodes.  Nov 9, says chest discomfort (denied chest pain when I initially saw pt) and cough are resolved.  Feels well now.     Plan:   Nov 7, sl wheezes, feels better, ask for tramadol tid (renal failure max dose 200/d),  Follows dr Bishop,  Pt ambulating and seems to be doing well.     Proteinuria noted, non gap acidosis likely renal- bicarb reasonable.  There is no pulmonary artery aneursym.       Pt was on oral cipro sice 10/ bid with renal failure and apparently  Failed. There are no blood cultures resulted on epic at this time?  No sputum submitted?   Order bicarb and dose prednisone x2.       Best to monitor another day as failed outpt abx?        Addendum- pt says did not take cipro for fear side effects.        Nov 8- ct reviewed as was today's cxr.  Pt is being rx for abx after presenting with fever and chill and hemoptysis - ct had airways open and lll infiltrate.  Pt had some brb hemoptysis today.     cta would be a problem with renal status. Will check d dimer and u/s legs and monitor.  Airways ok on ct -  Dx not clear.  Pt is on low dose steroid.  There is no concern re pulm art aneurysm.     Nov 9,  Sputum from yesterday pending this am.  Pt did  not use cipro pta- was ordered but pt didn't take.  Pt did have chill, fever, hemoptysis at admit with ? vague slight recurrence yesterday?  procalcitonin was 0.18 admit, below 0.10 would suggest not pneumonia.  No cta due to renal failure.       If no dvt on leg u/s this am--outpt on abx reasonable.  Will f/u in office with ct to assure left lower lung abn clears.  Working dx is pneumonia- has some atypical features.       Electronically signed by Mihir Guillen MD at 11/9/2018       The chief compliant  problem is new to me  PFSH:  Past Medical History:   Diagnosis Date    NICK (acute kidney injury) 7/29/2016    Allergy     Anemia, mild 12/15/2014    Arthritis     Gout    Benign essential HTN 3/27/2012    BMI 29.0-29.9,adult 5/10/2018    BPH (benign prostatic hyperplasia)     BPH (benign prostatic hyperplasia)     CAD (coronary artery disease) 2006    Chronic kidney disease     due to ibuprofen    Colon polyp     CRF (chronic renal failure), stage 5     Diverticulosis     Gastritis     GERD (gastroesophageal reflux disease)     Gout     History of colon polyps 5/3/2018    HTN (hypertension) 3/27/2012    Hyperlipidemia     Hyperlipidemia     LLL pneumonia 6/14/2018    LVH (left ventricular hypertrophy)     Mesenteric ischemia     Murmur, cardiac 3/27/2012    GRICELDA (obstructive sleep apnea)     DOES NOT USE A MACHINE    Sinus problem     Syncope and collapse          Past Surgical History:   Procedure Laterality Date    APPENDECTOMY      BLOCK-NERVE Left 5/31/2016    [facility]    CARDIAC CATHETERIZATION      COLONOSCOPY  2011    COLONOSCOPY N/A 5/3/2018    Procedure: COLONOSCOPY;  Surgeon: Messi Harris MD;  Location: [facility];  Service: Endoscopy;  Laterality: N/A;    COLONOSCOPY N/A 5/3/2018    [facility]    COLONOSCOPY N/A 9/10/2015    [facility]    CORONARY ARTERY BYPASS GRAFT   4/2007    x 1    CYSTOSCOPY N/A 2017    [facility]    CYSTOSCOPY N/A 11/10/2015    [facility]    ESOPHAGOGASTRODUODENOSCOPY (EGD) N/A 2016    [facility] ENDO (2ND FLR)    ESOPHAGOGASTRODUODENOSCOPY (EGD) N/A 10/15/2014    [facility]    INJECTION-STEROID-EPIDURAL-TRANSFORAMINAL Left 2016    [facility] OR    JOINT REPLACEMENT      left knee total replacement  X 3    mid leftt finger      from a cactuss    MOLE REMOVAL  2016    RADIOFREQUENCY THERMOCOAGULATION (RFTC)-NERVE-MEDIAN BRANCH-LUMBAR Left 2016    [facility] OR    rotative cuff      no rotative cuffs on bilat shoulders has pins     SHOULDER SURGERY      shoulder surgery bilat  RIGHT X 4; LEFT X 3    TRANSRECTAL ULTRASOUND GUIDED PROSTATE BIOPSY Bilateral 11/10/2015    [facility] OR    ULTRASOUND-ENDOSCOPIC-UPPER N/A 2017    [facility] ENDO (2ND FLR)    ULTRASOUND-ENDOSCOPIC-UPPER N/A 2016    [facility] ENDO (2ND FLR)    ULTRASOUND-ENDOSCOPIC-UPPER N/A 2015    [facility]formed by Jason Saleem [facility] at [facility] ENDO (2ND FLR)     Social History     Tobacco Use    Smoking status: Never Smoker    Smokeless tobacco: Never Used   Substance Use Topics    Alcohol use: No    Drug use: No     Family History   Problem Relation Age of Onset    Heart disease Mother     Sudden death Father     Cancer Father         advanced lung ca- found after 2 story fall    Stroke Sister     Pneumonia Sister          from PNA    Heart disease Sister     Hypertension Brother     Kidney cancer Neg Hx     Prostate cancer Neg Hx     Urolithiasis Neg Hx     Allergic rhinitis Neg Hx     Allergies Neg Hx     Angioedema Neg Hx     Asthma Neg Hx     Atopy Neg Hx      "Eczema Neg Hx     Immunodeficiency Neg Hx     Rhinitis Neg Hx     Urticaria Neg Hx      Review of patient's allergies indicates:   Allergen Reactions    Ace inhibitors Other (See Comments)     Cough    Arb-angiotensin receptor antagonist Itching    Eplerenone Other (See Comments)     Marked bradycardia, 40, tiredness and weakness      Sulfa (sulfonamide antibiotics) Itching     Patient says this was 10 years ago and doesn't remember what happened     I have reviewed past medical, family, and social history. I have reviewed previous nurse notes.    Performance Status:The patient's activity level is functions out of house.          Review of Systems   Constitutional: Negative for activity change, appetite change, chills, diaphoresis, fever and unexpected weight change. Positive for fatigue   HENT: Negative for dental problem, postnasal drip, rhinorrhea, sinus pressure, sinus pain, sneezing, sore throat, trouble swallowing and voice change.    Respiratory: Negative for apnea, cough, wheezing and stridor.  Positive for chest tightness, SOB  Cardiovascular: Negative for chest pain, palpitations and leg swelling.   Gastrointestinal: Negative for abdominal distention, abdominal pain, constipation and nausea.   Musculoskeletal: Negative for gait problem, myalgias and neck pain.   Skin: Negative for color change and pallor.   Allergic/Immunologic: Negative for environmental allergies and food allergies.   Neurological: Negative for speech difficulty, weakness, light-headedness, numbness and headaches. Positive for dizziness  Hematological: Negative for adenopathy. Does not bruise/bleed easily.   Psychiatric/Behavioral: Negative for dysphoric mood and sleep disturbance. The patient is not nervous/anxious.           Exam:Comprehensive exam done. /63 (BP Location: Right arm, Patient Position: Sitting)   Pulse 63   Ht 6' 1" (1.854 m)   Wt 97.6 kg (215 lb 2.7 oz)   SpO2 97% Comment: on room air  BMI 28.39 kg/m²  "  Exam included Vitals as listed  Constitutional: He is oriented to person, place, and time. He appears well-developed. No distress.   Nose: Nose normal.   Mouth/Throat: Uvula is midline, oropharynx is clear and moist and mucous membranes are normal. No dental caries. No oropharyngeal exudate, posterior oropharyngeal edema, posterior oropharyngeal erythema or tonsillar abscesses.  Mallapatti (M) score 3  Eyes: Pupils are equal, round, and reactive to light.   Neck: No JVD present. No thyromegaly present.   Cardiovascular: Normal rate, regular rhythm and normal heart sounds. Exam reveals no gallop and no friction rub.   No murmur heard.  Pulmonary/Chest: Effort normal and breath sounds normal. No accessory muscle usage or stridor. No apnea and no tachypnea. No respiratory distress, decreased breath sounds, wheezes, rhonchi, rales, or tenderness.   Abdominal: Soft. He exhibits no mass. There is no tenderness. No hepatosplenomegaly, hernias and normoactive bowel sounds  Musculoskeletal: Normal range of motion. exhibits no edema.   Lymphadenopathy:     He has no cervical adenopathy.     He has no axillary adenopathy.   Neurological:  alert and oriented to person, place, and time. not disoriented.   Skin: Skin is warm and dry. Capillary refill takes less 2 sec. No cyanosis or erythema. No pallor. Nails show no clubbing.   Psychiatric: normal mood and affect. behavior is normal. Judgment and thought content normal.       Radiographs (ct chest and cxr) reviewed: results reviewed   CT CHEST WITHOUT CONTRAST   Impression       1. Resolving left lower lobe pneumonia.  2. New airspace and ground-glass disease in the right lung which could reflect pneumonia or aspiration.  3. Small bilateral pleural effusions are new.  4. Redemonstrated pleuroparenchymal nodule at the right lung apex.  As discussed previously, additional follow-up is recommended.  5. Cardiomegaly and coronary artery disease.  6.  Enlarged pulmonary arterial  trunk which can be seen in the setting of pulmonary hypertension.  7. Ascending aorta ectasia.      Electronically signed by: Sean Avendaño  Date: 11/19/2018   XR CHEST 1 VIEW  Impression       Decrease of the left basilar infiltrate compared to the prior exam.  Suggest follow-up study to include lateral view as it is best seen on the lateral view.      Electronically signed by: Mayuri Castro MD  Date: 11/08/2018  Time: 14:19       Labs reviewed       Lab Results   Component Value Date    WBC 4.90 11/14/2018    RBC 2.81 (L) 11/14/2018    HGB 8.6 (L) 11/14/2018    HCT 25.3 (L) 11/14/2018    MCV 90 11/14/2018    MCH 30.5 11/14/2018    MCHC 33.8 11/14/2018    RDW 15.6 (H) 11/14/2018     11/14/2018    MPV 8.0 (L) 11/14/2018    GRAN 2.8 11/14/2018    GRAN 57.1 11/14/2018    LYMPH 1.3 11/14/2018    LYMPH 26.2 11/14/2018    MONO 0.5 11/14/2018    MONO 9.5 11/14/2018    EOS 0.3 11/14/2018    BASO 0.00 11/14/2018    EOSINOPHIL 6.8 11/14/2018    BASOPHIL 0.4 11/14/2018   Results for ARTEM WEI (MRN [id number]) as of 11/21/2018 11:08   Ref. Range 6/29/2018 12:42 11/6/2018 23:40   IgG - Serum Latest Ref Range: 650 - 1600 mg/dL 1298 684   IgM Latest Ref Range: 50 - 300 mg/dL 25 (L)    IgA Latest Ref Range: 40 - 350 mg/dL 233        PFT results reviewed  Pulmonary Functions Testing Results:  PFT results reviewed Pulmonary Functions, including spirometry and bronchodilator response and lung volumes and diffusion, study was done 1/10/18.  Spirometry shows loss of vital capacity and fev1 with no obstruction and no bronchodilator response.   FEV1 is 71% or 2.29 liters.  Lung volumes show  normal TLC.  Diffusion shows reduced but falls within normal range when corrected for lung volumes.     Pulmonary functions show low vital capacity but otherwise normal. MVV is lower than expected and could be from neuromuscular disease?  Clinical correlation recommended.     Mihir Guillen M.D.    Total face-to-face time with the  patient was 40 minutes and greater than 50% was spent in counseling and coordination of care. The above assessment and plan have been discussed at length. Labs and procedure reviewed. Problem List updated. Asked to bring in all active medications / pills bottles with next visit.      Plan:  Clinical impression is resonably certain and repeated evaluation prn +/- follow up will be needed as below.    Micheal was seen today for hospital follow up and medication management.    Diagnoses and all orders for this visit:    Mild persistent asthma without complication  -     albuterol (PROVENTIL/VENTOLIN HFA) 90 mcg/actuation inhaler; 2 puffs every 4 hours as needed for cough, wheeze, or shortness of breath  -     albuterol-ipratropium (DUO-NEB) 2.5 mg-0.5 mg/3 mL nebulizer solution; INHALE 1 VIAL BY NEBULIZER EVERY 6 HOURS AS NEEDED FOR WHEEZING  -     fluticasone (FLONASE) 50 mcg/actuation nasal spray; 2 sprays (100 mcg total) by Each Nare route once daily.  -     budesonide-formoterol 160-4.5 mcg (SYMBICORT) 160-4.5 mcg/actuation HFAA; Inhale 2 puffs into the lungs every 12 (twelve) hours. Controller    Pneumonia of both lungs due to infectious organism, unspecified part of lung  -     X-Ray Chest PA And Lateral; Future  -     CT Chest Without Contrast; Future    Exacerbation of asthma, unspecified asthma severity, unspecified whether persistent  -     albuterol-ipratropium (DUO-NEB) 2.5 mg-0.5 mg/3 mL nebulizer solution; INHALE 1 VIAL BY NEBULIZER EVERY 6 HOURS AS NEEDED FOR WHEEZING    Immunoglobulin deficiency  -     Humoral Immune Eval (Pneumo Serotypes) With H. Flu; Future  -     Pneumococcal polysaccharide vaccine 23-valent greater than or equal to 1yo subcutaneous/IM; Future        Follow-up in about 3 months (around 2/21/2019), or if symptoms worsen or fail to improve.    Discussed with patient above for education the following:      Patient Instructions   Prevnar 23 immunization now can get at local  pharmacy    Blood work 6 weeks at [facility] after immunization    Chest x-ray and chest CT Chest before next appointment    Take Symbicort inhaler 2 puff twice a day every day to prevent shortness of breath    Take antibiotic at first signs of infection yellow green mucous    Continue cipro as prescribed by Nephrologist due to kidney failure

## 2018-11-27 ENCOUNTER — TELEPHONE (OUTPATIENT)
Dept: FAMILY MEDICINE | Facility: CLINIC | Age: 78
End: 2018-11-27

## 2018-11-27 DIAGNOSIS — M25.561 CHRONIC PAIN OF BOTH KNEES: ICD-10-CM

## 2018-11-27 DIAGNOSIS — M47.896 OTHER SPONDYLOSIS, LUMBAR REGION: ICD-10-CM

## 2018-11-27 DIAGNOSIS — I11.0 HYPERTENSIVE LEFT VENTRICULAR HYPERTROPHY WITH HEART FAILURE: Primary | ICD-10-CM

## 2018-11-27 DIAGNOSIS — I51.89 DIASTOLIC DYSFUNCTION: ICD-10-CM

## 2018-11-27 DIAGNOSIS — R00.1 SYMPTOMATIC BRADYCARDIA: ICD-10-CM

## 2018-11-27 DIAGNOSIS — D63.8 ANEMIA OF CHRONIC DISEASE: ICD-10-CM

## 2018-11-27 DIAGNOSIS — G89.29 CHRONIC PAIN OF BOTH KNEES: ICD-10-CM

## 2018-11-27 DIAGNOSIS — N18.5 CRF (CHRONIC RENAL FAILURE), STAGE 5: ICD-10-CM

## 2018-11-27 DIAGNOSIS — M25.562 CHRONIC PAIN OF BOTH KNEES: ICD-10-CM

## 2018-11-27 DIAGNOSIS — M51.36 DDD (DEGENERATIVE DISC DISEASE), LUMBAR: ICD-10-CM

## 2018-11-27 NOTE — TELEPHONE ENCOUNTER
----- Message from Lj Lozano sent at 11/27/2018  2:02 PM CST -----  Contact: Magdy  Type: Needs Medical Advice    Who Called:  magdy with [facility]  Symptoms (please be specific):    How long has patient had these symptoms:    Pharmacy name and phone #:    Best Call Back Number: [phone]  Additional Information: requesting order for  Home physical therapy fax# [phone] or call back to give verbal

## 2018-11-28 ENCOUNTER — DOCUMENTATION ONLY (OUTPATIENT)
Dept: FAMILY MEDICINE | Facility: CLINIC | Age: 78
End: 2018-11-28

## 2018-11-28 NOTE — PROGRESS NOTES
Pre-Visit Chart Review  For Appointment Scheduled on 12/03/2018    Health Maintenance Due   Topic Date Due    Zoster Vaccine  09/27/1999

## 2018-11-29 ENCOUNTER — OUTPATIENT CASE MANAGEMENT (OUTPATIENT)
Dept: ADMINISTRATIVE | Facility: OTHER | Age: 78
End: 2018-11-29

## 2018-11-29 NOTE — PROGRESS NOTES
"11/29/2018  Summary:  RN-CM received High Risk OPCM referral for patient from PCP, Dr. Pk Lakhani. Reason for referral:  HCAP (healthcare-associated pneumonia). Chart review completed. Patient is a 79 y/o male with a prior medical history of Lumbar DDD, Lumbar Spondylosis, Mild Persistent Asthma, HCAP, Essential Hypertension, Carotid Stenosis, Hyperlipidemia, CAD (2 Vessel CABG 2007), Symptomatic Bradycardia, Chronic Combined Systolic and Diastolic Heart Failure, BPH, Elevated PSA, Chronic Renal Failure (Stage 5), Hyponatremia, Hypocalcemia, Immunoglobulin Deficiency, Thrombocytopenia, Anemia of Chronic Disease, Glucose Intolerance, GERD, Sigmoid Diverticulitis, Osteoarthritis, Gout, Chronic Back Pain, Nocturia, Hematuria, Dizziness and Debility. Patient's was recently hospitalized for HCAP (Healthcare-Associated Pneumonia), admitted to [facility] on 11/5/18, discharged on 11/9/18. Patient has CRF, Stage 5 and has been encouraged to start hemodialysis, which he states he "is not ready for". Patient's last renal function labs on 11/12/18 revealed BUN 82, Creatinine 5.4, and eGFR of 9. Patient has been referred to Dr. Nate Bae for laparoscopic insertion of a peritoneal dialysis catheter. Patient was contacted today by Dr. Bae's office to let him know that cardiac clearance had been obtained from Dr. Dunlap for the procedure. Patient stated that he is not ready and he will call when he is ready to schedule the surgery. Patient is currently receiving  services with [facility] out of [place], [place]. Will schedule patient for initial screen tomorrow. - JARVIS Kumar RN-OPCM    Interventions:  - Chart review completed.  - Scheduled patient for initial screen later this week.   "

## 2018-11-30 ENCOUNTER — OUTPATIENT CASE MANAGEMENT (OUTPATIENT)
Dept: ADMINISTRATIVE | Facility: OTHER | Age: 78
End: 2018-11-30

## 2018-11-30 ENCOUNTER — TELEPHONE (OUTPATIENT)
Dept: FAMILY MEDICINE | Facility: CLINIC | Age: 78
End: 2018-11-30

## 2018-11-30 RX ORDER — COLCHICINE 0.6 MG/1
TABLET ORAL
Qty: 30 TABLET | Refills: 1 | Status: SHIPPED | OUTPATIENT
Start: 2018-11-30 | End: 2019-01-15

## 2018-11-30 NOTE — TELEPHONE ENCOUNTER
Please see attached message from patient. Prescription for Colcrys 0.6 mg noted on file dated 10-22-18. Call placed to pharmacy to confirm refill availability. Advised patient canceled this prescription in person at pharmacy on 10-31-18 stating he was no longer taking this medication. Prescription for Allopurinol also noted. Please advise.

## 2018-11-30 NOTE — TELEPHONE ENCOUNTER
----- Message from Jose Nix sent at 11/30/2018 11:23 AM CST -----  Contact: self   Patient has gout in his left foot and need a rx called in. Please send to [facility]. Please call back at [phone] (home)     [facility]/pharmacy #[id number] - [place], [place] - [address] AT [facility]  [address]  [place] [address]  Phone: [phone] Fax: [phone]

## 2018-12-06 ENCOUNTER — OUTPATIENT CASE MANAGEMENT (OUTPATIENT)
Dept: ADMINISTRATIVE | Facility: OTHER | Age: 78
End: 2018-12-06

## 2018-12-06 DIAGNOSIS — R42 VERTIGO: ICD-10-CM

## 2018-12-06 RX ORDER — MECLIZINE HYDROCHLORIDE 25 MG/1
25 TABLET ORAL 3 TIMES DAILY PRN
Qty: 90 TABLET | Refills: 0 | Status: SHIPPED | OUTPATIENT
Start: 2018-12-06 | End: 2019-02-05 | Stop reason: SDUPTHER

## 2018-12-06 NOTE — LETTER
December 6, 2018    Micheal Hunt  [address]  [address]  [place] [address]             [facility] [address]  [place] [address] Dear Marilee,     Thank you for taking my call and discussing case management with me. We understand that receiving many services from different doctors and healthcare providers can be overwhelming and difficult to manage. There are appointments to make, transportation to arrange, dietary instructions to understand, and new medications to obtain. This is where I can help. The best thing about this service is there is no charge for this support, either to you or to your insurance company. We can provide this support to you over the phone so as not to conflict with any other appointments you have scheduled.    Our goal is to help you manage your health condition(s) safely, within your living environment. We went to help you function at the healthiest and highest level possible.    I have enclosed some educational resource information that you may find useful for you to review. I will be contacting you periodically for the next 2-3 months to assist you in managing your health care needs. We will review the information that I mailed to you and address any questions you may have at these calls. I look forward to working with you to meet the goals we have set to better manage your health care needs.     I have also enclosed my contact information and information on Outpatient Case Management (OPCM) services. I am available Monday through Friday from 8:00 AM to 4:30PM. If you have any questions or concerns, please don't hesitate to call me at [phone]. [facility] also has a program where a nurse is available 24/7 to answer questions or provide medical advice. That number is [phone].    Kind Regards,      Ondina Kumar, RN  [facility]  [phone]

## 2018-12-06 NOTE — PROGRESS NOTES
11/30/2018  Summary:  (1st Attempt)  Attempt to complete initial assessment for OPCM. Called patient at [phone]. Left message requesting return call. Will attempt to contact at a later date. - JARVIS Kumar RN-OPCM    Interventions:  - Left message requesting return call.  - Scheduled patient next week for next attempt.

## 2018-12-06 NOTE — PROGRESS NOTES
12/06/2018  Summary:  RN-CM contacted patient to complete initial screen for OPCM. Spoke with Mr. Burton via telephone.  accepted enrollment and completed the assessment with me today. Patient is A&OX4. Patient reports that he is  and has 2 daughters that are very supportive to him. He states that both daughter have Durable Power of , but patient states that he does not have an Advance Directive/Living Will. He is interested in obtaining [facility]'s Advance Directive to review. One daughter, Crow John, lives in [place]. The second daughter, Tonya Hunt, lives in a separate home on the same property as the patient, not far from his home. He states that his daughter, Tonya is his primary support person. He states that his grandson (6) and twin grandaughters (3) visit him often. Patient reports independence with his ADLs and IADLs, but states that he does not like to cook. He states that his daughter Tonya, cooks and sends meals to him often. He states that he follows a renal, low sodium diet, but was not able to fully articulate the foods that he should be avoiding/limiting. I will mail educational literature about Diet for Chronic Kidney Disease to patient and will review literature with patient at next phone call. Patient states that his doctor placed a dialysis fistula about a year ago in anticipation of the need to start dialysis when his kidney function was worsening. Patient reports that he just started hemodialysis 2 weeks ago. He states that he has to drive about an hour round trip to dialysis every M,W,F and stays on dialysis 3 hours on those days. He reports no problems to date and states that his dialysis access is functioning well. We went over ways of prevention problems with hemodialysis access: Dont wear tight clothes or jewelry around your access; Dont let anyone take your blood pressure on or draw blood from the arm with the access; Dont let anyone put IV lines  into it; Protect your access from being hit or cut; Wash your hands often and keep the area around the access clean; Do not carry anything heavy or do anything that would put pressure on the access. We went over signs and symptoms that would indicate a problem with hemodialysis access: Cant feel the blood flowing in the access (this sensation is called a thrill); Have pain or numbness in your hand or arm; Have bleeding, redness, bluish discoloration, or warmth around your access; Notice your access suddenly bulging out more than usual (a slight bulge is normal); Have a fever of 100.4°F (38°C) or higher, or as directed by your healthcare provider. I will mail educational literature about Hemodialysis/Hemodialysis Access to patient and will review literature with patient at next phone call. Patient states that he has been considering switching to Peritoneal Dialysis, but has made a decision yet. He states that he does not mind going to dialysis at this time, other than the long drive. He states that he has heard a lot of stories from a few people at the Dialysis Center, about people having problems with Peritoneal Dialysis, especially infections, and he is not sure that he wants to take that chance. He states that he will probably just continue with the Hemodialysis for now and then see how that goes for awhile. He states that he just basically reads, sleep or watches TV while he's dialyzing. Patient states that there is a SW at the Dialysis Center who is currently working on getting him connected with a new Nephrologist affiliated with a Dialysis Center in [place], which is much closer for him, but it is just taking some time. Patient reports no serious financial difficulties at this time. He reports that he is having trouble affording the co-pay on the Symbicort Inhaler. Patient is also a [place] [employer] the [place]. He is in agreement with referral to \[facility]\"" for assistance with medication co-pays and any VA  "benefits that patient may be eligible for. Patient is not currently receiving any VA benefits at this time. Will send referral to  for identified barriers. Patient states that prior to his recent hospitalization for pneumonia, he was attending Outpatient Physical Therapy ([facility]) that was ordered by Dr. Lakhani, which he had to stop due to the hospitalization and recovery. He states that he still has a HH nurse from [facility] out of [place], who visits him 1 time per week. He states that he plans on returning to Physical therapy in the next couple of weeks. Patient states that he believes that the PT will continue from the previous order. He states that he will let me know if he needs another order, but states that the therapist would probably just request the order from Dr. Lakhani if a new one is needed. Patient reports that he is recovering well from pneumonia. He reports compliance with his medication regimen. He reports that he has been doing his Duo-Neb 2 times per day as recommended by his physician and using the inhalers. He denies any worsening symptoms and reports feeling much better. We reviewed the signs and symptoms of HCAP: Cough; Mucus that is green, yellow, or bloody; Fever and chills; Shortness of breath; Chest pain when breathing or coughing; Confusion. Patient states that he has a working blood pressure cuff and a working scale. He states his blood pressure and weight is checked at dialysis on M,W,F and the HH nurse checks it once per week when she comes. He states that he does check, but does not log his pressure or his weight on the "other" days. Patient reports ranges from the 110's to 130's over 60's to 70's. Encouraged patient to check and log blood pressure and weight daily and will send patient blood pressure and weight logs. Patient states that he does not see the need when everyone else is monitoring it and they all tell him that it is normal. Informed patient that a " "normal blood pressure is less than 120/80. In chronic kidney disease, or CKD, the blood pressure goal is less than 130/80. I will mail educational literature about Living With High Blood Pressure and Kidney Disease to patient and will review literature with patient at next phone call. Patient denied any falls in the last 12 months, but in reviewing patient's chart, there is mention of a "trip and fall" in one of the doctor's notes. I will review again with patient at next call. In the interim, I will mail educational literature about Fall Prevention to patient and will review literature with patient at next phone call. Will continue to follow. Patient expressed appreciation for outreach and is in agreement with a follow up call within the next 2 weeks in order to allow time for him to receive the mailed educational information. -  RN-OPCM     Interventions:  - Sent referral to OPCM-LCSW for identified barriers. - Sent referral 12/6/18  - Mailed patient educational resource information on Hemodialysis/Hemodialysis Access, Peritoneal Dialysis, Diet for Chronic Renal Disease, Gout, Hypertension, Fall Prevention and Healthcare Associated Pneumonia.   - Reviewed signs and symptoms of HCAP with patient.   - Encouraged patient to measure and log blood pressure daily.  - Mailed blood pressure logs and weight logs to patient. Encouraged patient to measure and log weight daily.   - Empowered patient/caregiver to discuss treatment plan with Physician/care team.   - Encouraged compliance with annual vaccinations.   - Encouraged compliance with dialysis (M,W,F; 3 hours).  - Reviewed with and mailed a copy of patient's upcoming scheduled appointments.  - Encouraged compliance with Physician follow-ups.   - Encouraged compliance with preventive screenings.   - Encouraged compliance with scheduled laboratory testing and procedure.  - Encouraged Dietary Compliance (Renal Diet, Low Sodium).   - Encouraged Exercise as " tolerated.   - Encourage Medication Compliance.      Plan:  - Assess for receipt of mailed resources.  - Assess for contact with OPC-LCSW for identified barriers.  - Hemodialysis/Hemodialysis Access - Review educational information with patient.  - Peritoneal Dialysis - Review educational information with patient.  - Diet for Chronic Renal Disease - Review educational information with patient.  - Gout - Review educational information with patient.  - Hypertension - Review educational information with patient.  - Healthcare Associated Pneumonia - Review educational information with patient.  -  Fall Prevention - Review educational information with patient.    Todays [facility] Self-Management Care Plan was developed with the patients input and was based on identified barriers from todays assessment.  Goals were written today with the patient and the patient has agreed to work towards these goals to improve his overall well-being. Patient verbalized understanding of the care plan, goals, and all of today's instructions. Encouraged patient to communicate with his physician and health care team about health conditions and the treatment plan.  Provided my contact information today and encouraged patient to call me with any questions as needed.       Clinical Reference Documents Added to Patient Instructions       Document    BALANCING CALCIUM AND PHOSPHORUS, KIDNEY DISEASE (ENGLISH)    CHRONIC KIDNEY DISEASE, DIET FOR (ENGLISH)    FALLS, PREVENTING, ARE YOU AT RISK OF FALLING? (ENGLISH)    FALLS, PREVENTING, MAKING CHANGES IN YOUR LIVING SPACE (ENGLISH)    GOUT (ENGLISH)    GOUT ATTACKS, TREATING (ENGLISH)    GOUT DIET (ENGLISH)    GOUT, WHAT IS (ENGLISH)    HEMODIALYSIS (ENGLISH)    HEMODIALYSIS ACCESS BLEEDING (ENGLISH)    HEMODIALYSIS ACCESS, CARING FOR YOUR (ENGLISH)    HYPERTENSION, ESTABLISHED (ENGLISH)    KIDNEY DISEASE, HIGH BLOOD PRESSURE AND (ENGLISH)    KIDNEY DISEASE, LIVING WITH HIGH BLOOD PRESSURE AND  (ENGLISH)    KIDNEY FAILURE, TREATMENT OPTIONS FOR (ENGLISH)    KIDNEY FAILURE: YOUR HEALTHCARE TEAM (ENGLISH)    PD CATHETER AND EXIT SITE, CARING FOR YOUR (ENGLISH)    PD PERITONEAL DIALYSIS (ENGLISH)    PLACING THE CATHETER, PD CATHETER ACCESS (ENGLISH)    PNEUMONIA, HEALTHCARE-ASSOCIATED (ENGLISH)    REDUCING POTASSIUM IN FOODS, KIDNEY DISEASE (ENGLISH)    SODIUM, EATING LESS: KIDNEY DISEASE (ENGLISH)

## 2018-12-06 NOTE — LETTER
December 6, 2018    Micheal Hunt  [address]  [address]  [place] [address]             [facility]  [address] [facility] [address] [address] Dear  Gloriajeremy,    Thank you for taking my call and discussing case management with me. We understand that receiving many services from different doctors and healthcare providers can be overwhelming and difficult to manage. There are appointments to make, transportation to arrange, dietary instructions to understand, and new medications to obtain. This is where I can help. The best thing about this service is there is no charge for this support, either to you or to your insurance company. We can provide this support to you over the phone so as not to conflict with any other appointments you have scheduled.    Our goal is to help you manage your health condition(s) safely, within your living environment. We went to help you function at the healthiest and highest level possible.    I have enclosed some educational resource information that you may find useful for you to review. I will be contacting you periodically for the next 2-3 months to assist you in managing your health care needs. We will review the information that I mailed to you and address any questions you may have at these calls. I look forward to working with you to meet the goals we have set to better manage your health care needs.     I have also enclosed my contact information and information on Outpatient Case Management (OPCM) services. I am available Monday through Friday from 8:00 AM to 4:30PM. If you have any questions or concerns, please don't hesitate to call me at [phone]. [facility] also has a program where a nurse is available 24/7 to answer questions or provide medical advice. That number is [phone].    Kind Regards,      Ondina Kumar, RN  [facility]  [phone]

## 2018-12-06 NOTE — PATIENT INSTRUCTIONS
Peritoneal Dialysis (PD)  Peritoneal dialysis (PD) is a way to cleanse the blood to treat kidney failure. It uses the lining of your abdomen called the peritoneal membrane and a special solution (dialysate). The solution needs to be changed several times a day. This can be done as part of your home or work routine. Or it can be done at night by a machine. Dialysate is put inside your belly (abdomen) through a plastic tube (catheter). This catheter is surgically placed into your abdomen. It takes the catheter site 2 to 4 weeks to heal before you can use it. Your doctor may tell you to have this catheter put in place before your kidneys fail completely.   How PD works  The abdomen is filled with dialysate through the catheter and allowed to remain (dwell) for 4 to 6 hours. The membrane and dialysate then work to clean the blood. The dialysate needs to be changed every few hours. This is called an exchange.    Your experience  · You can do PD at home, work, or where ever is convenient.  · A nurse or technician will teach you how to do PD exchanges and care for the PD tube.  · You have an increased risk of infection in your abdomen (peritonitis). It is very important to carefully follow all instructions to prevent infection.   · You will need to weigh yourself every day to make sure you don't have too much fluid in your body.  · You will need to follow a special diet.   There are 2 ways to do exchanges:  · Continuous ambulatory peritoneal dialysis (CAPD). With this, you do your own exchanges 3 to 5 times per day , each with a dwell time of about 4 to 6 hours (you can have a longer dwell time overnight). Each exchange takes about 30 to 40 minutes each.  · Cyclic peritoneal dialysis (CCPD). This uses a machine called a cycler. The cycler does most of your exchanges at night while you sleep. You have a long dwell time during the day. You may also do manual daytime exchanges, which take 30 to 40 minutes each.  When to  call your healthcare provider   Contact your healthcare provider right away if you have any of the following:  · Fever of 100.4°F (38°C) or higher  · Chills  · Dialysate that is cloudy or bloody when it drains from your body  · Pain in your abdomen or around your catheter  · Skin around your catheter is warm, red, or has drainage  · Blocked flow into or out of your catheter  · Part of your belly appears to be bulging out (hernia)   Date Last Reviewed: 12/1/2016  © 5592-0038 [employer]. [address] [place] [address]. All rights reserved. This information is not intended as a substitute for professional medical care. Always follow your healthcare professional's instructions.        PD Catheter Access: Placing the Catheter  Your kidneys filter and remove waste from your blood. When they fail, this work must be done some other way. Peritoneal dialysis (PD) is a treatment that can take over when your kidneys stop working. The peritoneum is the membrane lining the inside of your (belly) abdomen. PD uses the lining of your abdomen as a filter for your blood. Before PD can be done, an opening into this lining (an access) must be made. The access for PD is a soft tube called a catheter placed into your abdomen.    Placing the catheter  · A nurse or anesthesiologist gives you medicine so you dont feel pain during surgery.  · A small opening is made just below your belly button (navel). The catheter is placed through this opening.  · One end of the catheter sits in your abdomen. A few inches of the other end comes out an exit site in your skin. This end is clamped off and capped when its not being used.  · Typically, a part of the catheter goes through a tunnel made underneath the skin before it enters your abdomen. This tunnel helps prevent infections from entering your abdomen. It also holds the catheter in place to keep it from falling out.  Date Last Reviewed: 1/1/2017  © 7866-1367 [employer]. [address] [place] [address]. All rights reserved. This information is not intended as a substitute for professional medical care. Always follow your healthcare professional's instructions.        Caring for Your PD Catheter and Exit Site  Your healthcare provider will teach you how to care for your catheter and exit site. Good care is important to prevent infection. If an infection occurs, the catheter may have to be removed and a new one put in at a later date.    During healing  Your skin will heal in a week or two. During this time, follow your healthcare provider's instructions. Don't get your catheter or exit site wet until your healthcare provider says you can.  After healing  · Always wash your hands before touching your catheter.  · Keep your catheter clean and covered.  · [place] or secure the catheter as instructed. Don't let clothes or things you carry rub or pull at it.  · Always do exchanges in a clean place.  · Never use a cloudy or leaking bag of dialysate.  · Wear a mask during doing exchanges.  · Don't swim in lakes or streams, rivers, or public pools. Avoid soaking in baths, hot tubs, or jacuzzis. The ocean or chlorinated private pools are OK.  Watching for problems  Call your healthcare provider if you notice any of these problems:  · Bleeding or draining from the exit site  · Red, painful, or warm skin around the catheter  · Fever of 100.4°F (38°C) or higher, or as directed by your healthcare provider  · Pain in your belly (abdomen)  · Cloudy or bloody dialysate when it drains from your abdomen  · Incomplete drainage of dialysate   Important numbers  Write the names and numbers of your healthcare providers below. You need to know how to get in touch with them, even after hours and on weekends and holidays.  Doctor:  Name ___________________ Phone ___________________  Surgeon:  Name ___________________ Phone ___________________  Dialysis Center:  Name ___________________  Phone ___________________   Date Last Reviewed: 2017  © 1658-1538 [employer]. [address] [place] [address]. All rights reserved. This information is not intended as a substitute for professional medical care. Always follow your healthcare professional's instructions.        Treatment Options for Kidney Failure  If the kidneys fail, blood is no longer filtered. Waste and extra fluids build up in the body, and chemicals become out of balance. This can make you ill and even cause death. Though kidney failure has no cure, certain treatments can help keep the body working the best it can.      Dialysis  Dialysis is a treatment that filters the blood. There are 2 types of dialysis:  · Hemodialysis filters blood with a filter attached to a machine. Blood travels from your body to the machine, is cleaned, and then returns to your body.  · Peritoneal dialysis uses the natural lining in your abdomen. A fluid placed inside your abdomen works with the lining to clean your blood.    Kidney transplant  A kidney transplant is a type of surgery. This is the way doctors prefer to treat kidney failure. Compared with dialysis, a transplant can offer you a better quality of life, as well as a longer life. A kidney is removed from another living person or from an organ donor who has just . The donated kidney is then placed in the body of the person with kidney failure. Once in place, the new kidney takes over the work that the old kidneys can no longer do. If the transplant succeeds, dialysis isn't needed.  Medicines  In addition to one of the treatments above, you may also need medicines to help with your kidneys' other tasks. Your healthcare team can tell you more about these medicines.   Date Last Reviewed: 2016  © 8384-1771 [employer]. [address] [place] [address]. All rights reserved. This information is not intended as a substitute for professional medical care.  Always follow your healthcare professional's instructions.        Caring for Your Hemodialysis Access  A problem such as an infection or a blood clot may make the access unusable. Typically, this happens more often with an arteriovenous graft than with an arteriovenous fistula. If this happens, youll need a new access. To help your access last, you will need to follow certain guidelines.  Watching for problems  Call your healthcare provider right away if you:  · Cant feel the blood flowing in the access (this sensation is called a thrill)  · Have pain or numbness in your hand or arm  · Have bleeding, redness, bluish discoloration, or warmth around your access  · Notice your access suddenly bulging out more than usual (a slight bulge is normal)  · Have a fever of 100.4°F (38°C) or higher, or as directed by your healthcare provider   Follow these and any other guidelines youre given  · Dont wear tight clothes or jewelry around your access.  · Dont let anyone take your blood pressure on or draw blood from the arm with the access. Also, dont let anyone put IV lines into it.  · Protect your access from being hit or cut.  · Wash your hands often and keep the area around the access clean.  · Do not carry anything heavy or do anything that would put pressure on the access.  Feeling for your thrill    If you put your fingers over your access, you should feel the blood rushing through it. This is called a thrill, and it feels like a vibration. Feel for the thrill as often as you're told, usually once or twice a day. If you can't feel it, tell your healthcare provider right away. Blood may not be flowing through your access the way it should.  Important numbers  Write the names and numbers of your healthcare providers below. That way you will know how to get in touch with them.  Doctor:  Name ___________________ Phone ___________________  Surgeon:  Name ___________________ Phone ___________________  Dialysis  Center:  Name ___________________ Phone ___________________   Date Last Reviewed: 1/1/2017 © 2000-2017 [employer]. [address] [place] [address]. All rights reserved. This information is not intended as a substitute for professional medical care. Always follow your healthcare professional's instructions.        Hemodialysis Access Bleeding  You have a hemodialysis access in your arm, either an arteriovenous (AV) fistula or an artery to vein graft. It has been bleeding. Blood needs to flow freely through the fistula or graft. As part of your treatment, you are also taking medicine that thins your blood. This makes you bleed more easily. It is important to stop your fistula or graft from bleeding as soon as possible--you can quickly bleed to death from a rapidly bleeding hemodialysis access.     For a quickly bleeding fistula or graft, use firm pressure with a gauze or cloth until it stops.   Home care  If your fistula or graft site is bleeding:  · If it is bleeding quickly, immediately put pressure right on the spot that is bleeding with a gauze or cloth. Push hard till it stops. Then, get medical help right away.  · If it is just oozing a small amount of blood, follow these directions:  · Wash your hands. Dry them on a clean towel.  · Put a small piece of sterile gauze on the area that is bleeding.  · Press gently with your fingertips only on the area that is bleeding. Hold your fingers there for 30 minutes. Do not press on the whole forearm. Do not wrap anything around the wrist or arm, including bandages.  · After 30 minutes, take your fingers off the area that was bleeding.  · Wash and dry your hands again.  · If it is still bleeding, call your healthcare provider or go to a hospital.  General access site care  The following guidelines will help you care for your access site:  · Wash your hands often. Keep the access site clean.  · Check the fistula or graft for the pulsing feeling  (thrill) every morning and night.  · Do not:  ¨ Let anyone take your blood pressure on your hemodialysis access arm.  ¨ Let anyone take blood from or inject medication into the arm.  ¨ Wear jewelry or tight sleeves over the access site.  ¨ Wiggle the fistula or graft, or pick at your skin near the access.  ¨ Carry anything over the arm or lift heavy objects with that arm.  ¨ Sleep on your arm with the access site.  Follow-up care  Follow up with your healthcare provider, or as advised.  Call 911  Call 911 if any of the following occur:  · Pain, cold, or numbness in the hand that won't go away  · Pale color of the hand that won't go back to pink  · Any major bleeding, or minor bleeding from the access site that won't stop  When to seek medical advice  Call your healthcare provider right away if you have any of the following:  · Fever of 100.4°F (38°C) or higher, or as directed by your healthcare provider  · Red, hot, or swollen area around the access site  · Light-colored fluid coming from the area around the access site  · Pain or hardness around the access site  · Loss of pulsing feeling (thrill) over the fistula or graft  Date Last Reviewed: 10/1/2016  © 5537-1875 [employer]. [address] [place] [address], [place] [address]. All rights reserved. This information is not intended as a substitute for professional medical care. Always follow your healthcare professional's instructions.        Hemodialysis    Hemodialysis is a type of treatment for kidney failure (also called end-stage kidney disease or ESRD). It uses a machine that holds a filter called a dialyzer. As blood flows through the dialyzer, waste is removed and fluid and chemicals are balanced. Hemodialysis treatments are usually done at a special dialysis center. In some cases, treatments may be done at home. As the kidney failure is getting worse, your doctor may advise you to have an access placed by a surgeon into one of your arms ahead of  time. This access may take several weeks to mature before it can be used for hemodialysis.  How hemodialysis is done  Two needles are inserted into a blood vessel (called an arteriovenous fistula or AV fistula) or arteriovenous graft (or AV graft), usually in your arm. Each needle is attached to a tube. One tube carries your blood into the dialyzer, where it is cleaned. Clean blood returns to your body through a second tube and needle. If this treatment has to be done as an emergency, a plastic tube (catheter) is inserted into a large vein, typically in the neck or groin. This catheter helps carry blood to and from the dialysis machine.  Your experience  · Hemodialysis usually takes about 3 to 5 hours. It is usually done 3 times a week.  · Youll have a regular schedule for your hemodialysis. Many centers have evening and weekend hours as well as weekday hours to help you continue working. Some centers also offer overnight treatments.  · A trained nurse or technician connects you to the dialysis machine. He or she watches for problems and makes sure you are comfortable.  · During treatment, only a small amount of blood (about 1 cup) is out of your body at any one time.  · During your treatments, you may have a headache, muscle cramps, nausea and vomiting, chest and back pain, itching, and fever and chills. Make sure you tell your nurse or technician if you have any of these symptoms.  · Some people are able to learn to use a dialysis at home. Home dialysis allows you to schedule treatments when it is most convenient. You may have more frequent treatments, but for shorter periods of time. You may also do overnight treatments.  Problems to watch for  Get immediate medical help or call your doctor, nurse or dialysis technician if you have any of these symptoms after treatment:  · Chest pain  · Bleeding from the needle site  · Shortness of breath  · Fever or chills  · Headache or lightheadedness  · Nausea or  vomiting  · Itching  · Muscle cramps  · Pain, warm or redness at your access site  · Inability to feel your blood flow (called a thrill) in your AV fistula or graft   Date Last Reviewed: 12/1/2016 © 2000-2017 [employer]. [address] [place] [address]. All rights reserved. This information is not intended as a substitute for professional medical care. Always follow your healthcare professional's instructions.        Hemodialysis    The job of the kidneys is to remove waste and extra water from the body. The kidneys also balance levels of minerals in the body, called electrolytes. Kidneys are essential for the health of the body. People with severe kidney disease are not able to perform these functions. This may be due to a temporary or a permanent kidney condition. Hemodialysis is a treatment that takes over the essential functions of the kidney until you recover from kidney disease, or get a kidney transplant. If you have end stage renal disease and are not eligible for a transplant, you will need to have hemodialysis for the rest of your life. Peritoneal dialysis is another type of dialysis but is not covered in this article.  Hemodialysis requires access to a strong blood flow. There are 3 ways to do this.   · Dialysis catheter. This is a plastic tube put into a large blood vessel, usually for temporary access. If there is no other option, it may be used permanently.  · AV fistula. Through a minor surgical procedure, an artery in your arm is joined directly to a vein. This creates an arteriovenous fistula or AV fistula. The pressure of the arterial blood flow slowly expands the size of the attached vein. It may take up to 4 to 6 weeks for the fistula to enlarge enough to be ready for dialysis. The AV fistula is the access of choice. This is because there are typically fewer problems and side effects. It also lasts longer than the other options.  · AV graft. This is an artificial implant  that joins an artery and vein in people with small veins. It can also be used when an AV fistula did not work. It can be used for dialysis a few weeks after the surgery.  A connecting tube carries blood from the access point in the body to the dialysis machine where special filters called dialyzers filter and process the blood. The blood is then returned to the body through a separate tube and needle. This takes 3 to 4 hours and is usually done 3 times a week. Home dialysis is an option for some patients.  Hemodialysis is lifesaving for people with kidney failure, but there are possible side effects. These include infection, low blood pressure, bleeding, electrolyte imbalance, anemia, and heart disease.  Home care  The following guidelines will help you care for yourself at home:  · Take any medicines as directed.  · Follow the special diet for kidney failure that your healthcare provider gave you.  · Dont wear any clothing or jewelry that could put pressure on the access site.  · Dont lie on the access site while sleeping.  · If the access is in your arm, have blood pressure readings and blood samples taken from the other arm.  · Check the access site after dialysis for swelling, bleeding, or signs of infection.  · If you have an AV fistula or graft, check the site regularly to be sure you can always feel the vibration (called the thrill) of blood flowing from artery to vein. Dont put lotions or other products on the access site.  · If you have an external dialysis catheter, avoid physical activities that could pull on the catheter.  Follow-up care  Follow up with your healthcare provider, or as advised.  Call 911  Call 911 if any of these occur:  · External catheter starts bleeding or opens to air  · Severe weakness, dizziness, fainting, drowsiness, or confusion  · Chest pain or shortness of breath  When to seek medical advice  Call your healthcare provider right away if any of these occur:  · Color of the  blood in the external tubing changes from bright red to dark red  · You dont feel the thrill (vibration) in an AV fistula or graft  · Nausea or vomiting  · Unexpected weight gain or swelling in the legs, ankles, or around the eyes  · Decreased or absent urine output if you previously made urine  · Fever of 100.4ºF (38ºC) or higher, or as directed by your healthcare provider  Date Last Reviewed: 10/1/2016  © 9049-0985 [employer]. [address] [place] [address], [place] [address]. All rights reserved. This information is not intended as a substitute for professional medical care. Always follow your healthcare professional's instructions.        High Blood Pressure and Kidney Disease  If you have high blood pressure and it is not controlled, it can damage the walls of the blood vessels in your body including those in the kidneys. If that happens, the tiny filtration units, or nephrons, become damaged and less able to filter your blood and waste products in the blood. Lowering high blood pressure can reduce the amount of damage to your kidneys and help slow any progression of kidney disease. High blood pressure is one of the top two causes of kidney failure in [place] countries.     Follow the instructions that come with your kit.     Normal blood pressure  The systolic pressure is when your heart is beating and pumping blood. The diastolic pressure is when your heart is relaxing and refilling with blood. A normal blood pressure is less than 120/80. In chronic kidney disease, or CKD, the blood pressure goal is less than 130/80.   Check your blood pressure often  Checking your blood pressure is a simple test that you can do at home. Most pharmacies have in-store monitors and home blood pressure monitors. For best results, keep the hints below in mind.  · Always take your blood pressure at the same time of the day. Morning may be best.  · Sit so that you feel relaxed, and do not talk.  · Use the cuff on your  bare arm.  · Place the cuff so it fits snugly on your upper arm. Some monitors are placed on the wrist.  · Follow all the instructions that come with your kit.  · Keep a record of all your blood pressure readings.  · Take your record and kit with you to healthcare provider visits. Ask your healthcare provider to check your blood pressure using your kit, and compare your readings with your providers.  Take medicine as directed  Blood pressure medicines often play a large role in treatment. Your medicine will work best if its taken as directed. Be sure to do these things:  · Take your medicine at the same time each day.  · Find out if it should be taken with food.  · Call your healthcare provider if you think the medicine is making you dizzy or sick to your stomach.  · Do not skip doses.  · Do not stop taking your medicine unless your healthcare provider tells you to. Doing so may be harmful.  · Get regular urine and blood tests at least annually to watch for kidney disease or monitor existing kidney disease.  Addressing other risk factors for kidney disease  Many other factors can also contribute to kidney disease. Smoking, diabetes, dietary habits, lack of exercise, obesity, and other factors can contribute.  Date Last Reviewed: 2/1/2017  © 6338-8957 [employer]. [address] [place] [address], [place] [address]. All rights reserved. This information is not intended as a substitute for professional medical care. Always follow your healthcare professional's instructions.        Living with High Blood Pressure and Kidney Disease  By lowering high blood pressure, you can reduce the amount of damage to your kidneys, and help slow any progression of kidney disease. Visit your healthcare provider as scheduled and follow the tips below.    Eat right  To control blood pressure and kidney disease:  · Limit salt (sodium) intake. Your sodium limit is 1,500 mg a day. Sodium makes up 40% of table salt, which is also  called sodium chloride. So 1,500 mg of sodium equals 3,800 mg of salt. It's very important to read and understand this difference when reading food labels. The following guide will make it easy to understand how much sodium is in different amounts of salt:  ¨ 1/4 teaspoon salt = 600 mg sodium  ¨ 1/2 teaspoon salt = 1,200 mg sodium  ¨ 3/4 teaspoon salt = 1,800 mg sodium  ¨ 1 teaspoon salt = 2,400 mg sodium  · Cook with spices and herbs instead of salt.  · Eat fresh foods instead of canned or processed ones.  · Eat less fat. Avoid fats that come from animal sources.  · Choose low-fat dairy foods.  You may also need to  · Eat less protein.  · Drink less fluid.  · Eat foods that are low in phosphorus and potassium.  Stay active  Regular activity helps reduce high blood pressure. For best results:  · Talk with your healthcare provider before starting a fitness program. Your provider may be able to suggest activities that will help you feel your best.  · Ask your healthcare provider how often you should exercise and for how long.  · Try to exercise at the same time each day.  Date Last Reviewed: 2/1/2017  © 6389-4941 [employer]. [address] [place] [address], [place] [address]. All rights reserved. This information is not intended as a substitute for professional medical care. Always follow your healthcare professional's instructions.        Diet for Chronic Kidney Disease  Following a special diet when you have kidney disease can help you stay as healthy as possible. Your healthcare provider or dietitian should make a special diet plan just for you.    Eating right  Here are some good eating rules to follow:  · Protein. Eating protein is important for your body. But too much protein can put a strain on your kidneys. Eating less protein may slow the progression of chronic kidney disease. Foods high in protein include meat, fish, eggs, cheese, and other dairy products. A registered dietitian can help you plan a  diet that has the right amount of protein for you.  · Sodium. Having too much salt in your diet can make your body hold onto (retain) water. Ask your provider or dietitian how much sodium per day you are allowed. This will help you avoid fluid buildup in your body (fluid retention). It can also help control high blood pressure. Learn to read food labels to know how much sodium is in one serving. Foods high in salt include processed meats, canned and boxed foods, sauces, salted chips and snacks, pickled foods, frozen dinners, and restaurant and fast food.  · Fluids. If you have advanced kidney disease, you will need to limit the water and fluids you drink. If you dont, then too much water will build up in your body. The exact amount of fluid you can drink depends on how well your kidneys are working. Ask your provider how much water you can safely drink each day.  · Potassium. In advanced kidney disease, your potassium level can go dangerously high. This affects your heart. It can cause an irregular heartbeat (arrhythmia). Ask your provider or dietitian if you should limit potassium in your diet. Foods high in potassium include dairy products (milk, yogurt, cheese), dried beans, bananas, oranges, potatoes, tomatoes, spinach, cantaloupe, honeydew melon, dried fruits, and nuts.   · Calcium. Calcium is important to build strong bones. But foods high in calcium are also high in phosphorus, which can take calcium from your bones. Limiting foods high in phosphorus will help keep calcium in your bones. Ask your provider how much calcium you should get each day.  · Phosphorus. In advanced kidney disease, your phosphorus level can go dangerously high. This affects many systems in the body and can damage your heart. Limit your intake of phosphorus-rich foods. These include dried beans and peas, nuts, peanut butter, cocoa, beer, cola drinks, and dairy products.  Date Last Reviewed: 8/1/2016  © 0440-7451 [employer]  [employer]. [address] [place] [address]. All rights reserved. This information is not intended as a substitute for professional medical care. Always follow your healthcare professional's instructions.        Kidney Failure: Your Healthcare Team  The members of your healthcare team are the people who work with you to manage your treatment. They will guide you through your choices and teach you how to live with kidney failure. They can also address your concerns and give you support. Work with your team to ensure that you feel your best.    The members of your team  These are some of the people who make up your healthcare team. You may already know a few of them:  · A nephrologist is a doctor who treats kidney problems. He or she prescribes your treatment.  · A primary care doctor who sees you for wellness care and acute and chronic conditions. He or she will continue to care for your kidney disease, but will be joined by the nephrologist and other team providers.  · An advanced practitioner who may be a nurse practitioner or a physician's assistant. He or she works with a doctor specialist, like a nephrologist, or your primary care doctor.  · A nurse works with you and your doctors to manage your care and help you learn what you need to know about your treatment.  · A dialysis technician assists with dialysis treatment.  · A surgeon is a doctor who performs procedures to help with dialysis or who does a kidney transplant.  · A dietitian (also called a nutritionist) teaches you which foods and fluids to eat and drink and which to limit.  · A  can help you deal with paperwork, insurance, your work and family life, and any stress you may feel.  · A pharmacist is an expert in medicine and how much of them to take. The pharmacist can teach you the correct way of taking your medicines. He or she can also tell you how to watch for any side effects or interactions between the medicines you take.   Date  Last Reviewed: 12/1/2016  © 7582-8711 [employer]. [address] [place] [address]. All rights reserved. This information is not intended as a substitute for professional medical care. Always follow your healthcare professional's instructions.        Kidney Disease: Reducing Potassium in Food  By controlling the amount of potassium you eat, you can keep a safe level in your blood. Cooking helps remove potassium from starchy vegetables, such as potatoes. To reduce potassium, boil the vegetables in a large amount of unsalted water. Drain and discard the water before serving. The tips on this sheet can help.  To cook potatoes  Follow the steps below to reduce the potassium content of white potatoes:  · Peel and cut the potatoes into 1/8-inch pieces.  · Place the potatoes in a large amount of unsalted water. Allow to stand for at least 2 hours.  · Drain, rinse, and drain the potatoes again.  · Cook in a large amount of unsalted water.  Watch out for hidden sources of potassium  The potassium content of a food may change depending on how the food is preserved. Most food labels do not include potassium, so keep these tips in mind:  · Dried fruits are high in potassium. Canned fruits are lower.  · Other foods with high levels of potassium include salt substitutes, lite salts, milk, coffee, and some vegetable juices and powdered drink mixes.  Date Last Reviewed: 1/1/2017  © 3961-1715 [employer]. [address] [place] [address]. All rights reserved. This information is not intended as a substitute for professional medical care. Always follow your healthcare professional's instructions.        Kidney Disease: Eating Less Sodium  Sodium is a mineral that the body needs in small amounts. Sodium is found in table salt. Most people eat far more salt than they need. There are 2main reasons for this: Salt is present in high amounts in most processed foods (pre-prepared foods like  breakfast cereals, cookies, and pickles) and in all restaurant foods. In other words, if you are not cooking from fresh ingredients at home, you are very likely eating more salt than you need. When sodium intake is too high, it can increase thirst and cause the body to retain fluid. To avoid these side effects, people with chronic kidney disease are often told to eat less sodium. The tips on this sheet can show you how.  People with chronic kidney disease should restrict their salt intake to less than 1,500 milligrams (mg) of sodium daily. Food labels generally report the sodium content. Table salt is sodium chloride. One level teaspoon of table salt contains 2.300 mg of sodium.    When you shop for food  Unlike canned and processed foods, fresh foods have no added salt and are better for you. When youre food shopping:  · Choose fresh foods when you can.  · Read food labels before buying packaged foods. Check the labels nutrition facts for sodium amounts and servings per package.  · Try to pick packaged foods with a sodium content of 140 mg (milligrams) or less per serving.  · Do not choose foods with over 400 mg of sodium per serving.  Season instead of salt  Try the seasonings and foods listed below to season without sodium.  · Basil: tomatoes, squash, eggplant, soups, fish  · Bryan: soups, rice, lentils, chicken  · Dill: beets, cucumbers, green beans  · Garlic: sauces, vegetables, meats, fish  · Yeni: carrots, chicken, cooked fruit, white sauces  · Lemon: asparagus, artichokes, broccoli, spinach, fish  · Mint: cold soups, salads, fruit dishes  · Oregano: eggplant, chicken, salads, sauces  · Thyme: chicken, fish, lean meats, soups, stews  Food that has salt added during cooking tastes less salty than if salt is sprinkled on it at the table. Therefore, use half the amount of salt you want to have in your food during cooking, and sprinkle the other half on the food at the table.  Do not use seasoned salt or salt  substitutes. They may contain sodium or potassium (another mineral people with kidney disease are often told to limit).  Date Last Reviewed: 2/1/2017 © 2000-2017 [employer]. [address] [place] [address], [place] [address]. All rights reserved. This information is not intended as a substitute for professional medical care. Always follow your healthcare professional's instructions.        Kidney Disease: Balancing Calcium and Phosphorus    Calcium and phosphorus are minerals found in many foods. Your body works best when these minerals are in balance. But if you have kidney disease, phosphorus may build up in your blood. This can weaken your bones over time. To help keep your bones strong, control the amount of phosphorus in your body. This sheet tells you how.  Take phosphate binders  Phosphate binders are medicines that stick to the phosphorus in the food you eat. This keeps the phosphorus from being absorbed into your body. Instead, the phosphorus passes from your body with stool (solid waste). For best results, keep these tips in mind:  ? Use only the type of phosphate binder that your healthcare provider recommends. The type of binder that you should be taking is ___________________________  ? Always take phosphate binders as directed.  ? With meals  ? Other _________________________  ? Phosphate binders can cause constipation. You may need to eat more fiber or take stool softeners.  Limit these foods   Do not eat these foods   To keep calcium and phosphorus in balance, limit the amount of phosphorus you eat. To do so, eat less of the following foods:  · Milk  · Chocolate  · Cheese  · Beer  · Yogurt  · Soybeans  · Ice cream   Some foods are so high in phosphorus that you may need to stop eating them. Talk with your dietitian before eating these foods:  · Cola drinks  · Dried or baked beans  · Nuts and seeds of all kinds  · Peanut butter  · Split peas  · Whole-grain cereals   Date Last Reviewed:  1/1/2017 © 2000-2017 [employer]. [address] [place] [address], [place] [address]. All rights reserved. This information is not intended as a substitute for professional medical care. Always follow your healthcare professional's instructions.        Healthcare-Associated Pneumonia     To help prevent pneumonia after surgery, a spirometer, a device that helps patients take deep breaths, may be used.    Pneumonia is an infection in one or both lungs. Its caused by germs, such as bacteria, viruses, and fungi. Some people develop pneumonia by coming in contact with germs in the course of daily life, such as at school, work, or the gym. This is sometimes called community-acquired pneumonia. Others develop pneumonia during a stay in the hospital. This is called hospital-acquired pneumonia.  And still others develop pneumonia following some type of contact with the healthcare system. A patient may have had recent medical treatments including:  · Intravenous (IV) medicines including chemotherapy for cancer  · Wound care  · Hemodialysis for kidney disease  Or a person may:  · Live in a nursing home or other long-term care facility  · Have recently been in the hospital  This is called healthcare associated pneumonia (HCAP). It can be very serious since these people often have other medical conditions. And, the germs in healthcare facilities may be especially difficult to treat.  What are the risk factors for HCAP?  The following are risk factors for HCAP:  · Age -- either very young or very old  · Chronic lung disease or other serious health problems  · Weak immune system from medicine or disease  · Trouble swallowing, coughing, or clearing mucus from the throat or lungs  · Trouble taking a deep breath  · Tube feedings or receiving food through a tube into the stomach or small intestine  · Taking certain medicines that may increase the growth of germs  · Recent antibiotic therapy  · Exposure to equipment or supplies  that are not clean  What are the symptoms of HCAP?  Symptoms include:  · Cough  · Mucus that is green, yellow, or bloody  · Fever and chills  · Shortness of breath  · Chest pain when breathing or coughing  · Confusion  How is HCAP diagnosed?  Pneumonia can be hard to diagnose in people who are already ill. Signs and symptoms, physical exam, and changes on chest X-ray are used in the diagnosis. Blood tests and cultures of blood and lung secretions are used to identify the cause and help determine treatment.  How is HCAP treated?  Pneumonia caused by bacteria is treated with antibiotics. If there is no improvement in symptoms, healthcare providers look for other causes and then determine the best treatment. Other care includes:  · Close monitoring  · Supportive care includes providing supplemental oxygen, removing mucus from the lungs, and giving breathing treatments  What are the complications of HCAP?  Complications of HCAP include:  · Recurrent hospitalization  · Even more serious pneumonia  · Other serious infections  · Developing resistance to antibiotics; this means that the antibiotics don't work to fight HCAP or other infections.  How can HCAP be prevented?  Healthcare professionals should follow all policies and procedures to help prevent HCAP.  · Handwashing. This is the single most important way to prevent the spread of germs. Healthcare workers should wash their hands or use an alcohol-based hand  before and after treating each patient.   · Limit the use of breathing machines. People who are on ventilators should be checked often to see if they are able to breathe without the ventilators.  · Keep patients heads elevated. Any patient with trouble swallowing or on a ventilator should have his or her head elevated. This helps prevent bacteria, secretions, food, or liquids from getting into the lungs.  · Careful use of antibiotics. Antibiotics should only be used when needed and for the shortest time  possible. This helps prevent the growth of bacteria that are more harmful and harder to kill. Antibiotics should not be used to treat viruses.  · Clean or sterile equipment or devices. All equipment and devices should be carefully cleaned or sterilized as recommended by the manufacturers.  · Careful monitoring. Patients with feeding tubes, wounds, trouble swallowing, or other factors that increase the risk of HCAP are closely monitored.  Preventing HCAP: What patients can do  Patients and family members should do the following.  · Ask all hospital staff to wash their hands. Dont be afraid to speak up!  · Wash your own hands often with soap and water. Or use an alcohol-based hand .  · Keep the head of the bed raised.  · Do any breathing exercises your healthcare provider suggests. A simple breathing device called an incentive spirometer can help you take deep breaths.   · Walk if you can. Walking helps your lungs stay clear.  · Talk with your healthcare provider about what vaccinations you need.  Tips for good handwashing  Follow these steps:  · Use warm water soap. Rub your hands together.  · Clean the whole hand, under your nails, between your fingers, and up the wrists.  · Wash for at least 15 to 20 seconds.  · Rinse, letting the water run down your fingers, not up your wrists.  · Dry your hands well. Use a paper towel to turn off the faucet and open the bathroom door.  Using alcohol-based hand   Alcohol-based hand  are also a good choice when soap and water aren't available. Follow these steps:  · Spread about a tablespoon of  in the palm of one hand.  · Rub your hands together briskly, cleaning the backs of your hands, the palms, between your fingers, and up the wrists.  · Rub until the  is gone and your hands are completely dry.  When to call the healthcare provider  Call your healthcare provider, or 911 for severe symptoms if you have any of the following:  · Trouble  breathing  · Chest pain  · Chills or a fever  · Cough  If you have any questions or concerns about HCAP, or your risk of developing it, talk with your healthcare provider.   Date Last Reviewed: 1/1/2017 © 2000-2017 [employer]. [address] [place] [address]. All rights reserved. This information is not intended as a substitute for professional medical care. Always follow your healthcare professional's instructions.        What Is Gout?  Gout is a disease that affects the joints. Left untreated, it can lead to painful foot and joint deformities and even kidney problems. But, by treating gout early, you can relieve pain and help prevent future problems. Gout can usually be treated with medication and proper diet. In severe cases, surgery may be needed.  What causes gout?  Gout is caused by an excess of uric acid (a waste product made by the body). Uric acid is excreted by the kidneys. If the uric acid level in your blood rises too high, the uric acid may form crystals that collect in the joints, bringing on a gout attack. If you have many gout attacks, crystals may form large deposits called tophi. Tophi can damage joints and cause deformity.  Who is at risk for gout?  Men are more likely to have gout than women. But women can also be affected, mostly after menopause. Some health problems, such as obesity and high cholesterol, make gout more likely. And some medications, such as diuretics (water pills), can trigger a gout attack. People who drink a lot of alcohol are at high risk for gout. Certain foods can also trigger a gout attack.  Substances that may trigger a gout attack  To help prevent a gout attack, avoid these foods:  · Alcohol (particularly beer, but also red wine and spirits)  · Certain meats (red meat, processed meat, turkey)  · Organ meats (kidney, liver, sweetbread)  · Shellfish (lobster, crab, shrimp, scallop, mussel)  · Certain fish (anchovy, sardine, herring,  "mackerel)   Treatment  · Lifestyle changes, including weight loss, exercise, and quitting tobacco use  · Reducing consumption of the food groups above as well as high fructose corn syrup, found in many foods including sodas and energy drinks  · Changing non-essential medications that may contribute to gout (such as thiazide diuretics--"water pills")  · Medications to reduce the amount of uric acid in the blood, such as allopurinol or others  · Medications to treat acute gout attacks, including NSAIDs (nonsteroidal anti-inflammatory medicines), steroids, and colchicine  Date Last Reviewed: 2/1/2016  © 1993-4347 [employer]. [address] [place] [address], [place] [address]. All rights reserved. This information is not intended as a substitute for professional medical care. Always follow your healthcare professional's instructions.        Treating Gout Attacks     Raising the joint above the level of your heart can help reduce gout symptoms.     Gout is a disease that affects the joints. It is caused by excess uric acid in your blood stream that may lead to crystals forming in your joints. Left untreated, it can lead to painful foot and joint deformities and even kidney problems. But, by treating gout early, you can relieve pain and help prevent future problems. Gout can usually be treated with medication and proper diet. In severe cases, surgery may be needed.  Gout attacks are painful and often happen more than once. Taking medications may reduce pain and prevent attacks in the future. There are also some things you can do at home to relieve symptoms.  Medications for gout  Your healthcare provider may prescribe a daily medication to reduce levels of uric acid. Reducing your uric acid levels may help prevent gout attacks. Allopurinol is one commonly used medication taken daily to reduce uric acid levels. Other medications can help relieve pain and swelling during an acute attack. Medicines such as NSAIDs " (nonsteroidal anti-inflammatory medicines), steroids, and colchicine may be prescribed for intermittent use to relieve an acute gout attack. Be sure to take your medication as directed.  What you can do  Below are some things you can do at home to relieve gout symptoms. Your healthcare provider may have other tips.  · Rest the painful joint as much as you can.  · Raise the painful joint so it is at a level higher than your heart.  · Use ice for 10 minutes every 1-2 hours as possible.  How can I prevent gout?  With a little effort, you may be able to prevent gout attacks in the future. Here are some things you can do:  · Avoid foods high in purines  ¨ Certain meats (red meat, processed meat, turkey)  ¨ Organ meats (kidney, liver, sweetbread)  ¨ Shellfish (lobster, crab, shrimp, scallop, mussel)  ¨ Certain fish (anchovy, sardine, herring, mackerel)  · Take any medications prescribed by your healthcare provider.  · Lose weight if you need to.  · Reduce high fructose corn syrup in meals and drinks.  · Reduce or eliminate consumption of alcohol, particularly beer, but also red wine and spirits.  · Control blood pressure, diabetes, and cholesterol.  · Drink plenty of water to help flush uric acid from your body.  Date Last Reviewed: 2/1/2016  © 4254-7626 [employer]. [address] [place] [address], [place] [address]. All rights reserved. This information is not intended as a substitute for professional medical care. Always follow your healthcare professional's instructions.        Gout    Gout is an inflammation of a joint due to a build-up of gout crystals in the joint fluid. This occurs when there is an excess of uric acid (a normal waste product) in the body. Uric acid builds up in the body when the kidneys are unable to filter enough of it from the blood. This may occur with age. It is also associated with kidney disease. Gout occurs more often in people with obesity, diabetes, high blood  pressure, or high levels of fats in the blood. It may run in families. Gout tends to come and go. A flare up of gout is called an attack. Drinking alcohol or eating certain foods (such as shellfish or foods with additives such as high-fructose corn syrup) may increase uric acid levels in the blood and cause a gout attack.  During a gout attack, the affected joint may become a hot, red, swollen and painful. If you have had one attack of gout, you are likely to have another. An attack of gout can be treated with medicine. If these attacks become frequent, a daily medicine may be prescribed to help the kidneys remove uric acid from the body.  Home care  During a gout attack:  · Rest painful joints. If gout affects the joints of your foot or leg, you may want to use crutches for the first few days to keep from bearing weight on the affected joint.  · When sitting or lying down, raise the painful joint to a level higher than your heart.  · Apply an ice pack (ice cubes in a plastic bag wrapped in a thin towel) over the injured area for 20 minutes every 1 to 2 hours the first day for pain relief. Continue this 3 to 4 times a day for swelling and pain.  · Avoid alcohol and foods listed below (see Preventing attacks) during a gout attack. Drink extra fluid to help flush the uric acid through your kidneys.  · If you were prescribed a medicine to treat gout, take it as your healthcare provider has instructed. Don't skip doses.  · Take anti-inflammatory medicine as directed.   · If pain medicines have been prescribed, take them exactly as directed.    Preventing attacks  · Minimize or avoid alcohol use. Excess alcohol intake can cause a gout attack.  · Limit these foods and beverages:  ¨ Organ meats, such as kidneys and liver  ¨ Certain seafoods (anchovies, sardines, shrimp, scallops, herring, mackerel)  ¨ Wild game, meat extracts and meat gravies  ¨ Foods and beverages sweetened with high-fructose corn syrup, such as  sodas  · Eat a healthy diet including low-fat and nonfat dairy, whole grains, and vegetables.  · If you are overweight, talk to your healthcare provider about a weight reduction plan. Avoid fasting or extreme low calorie diets (less than 900 calories per day). This will increase uric acid levels in the body.  · If you have diabetes or high blood pressure, work with your doctor to manage these conditions.  · Protect the joint from injury. Trauma can trigger a gout attack.  Follow-up care  Follow up with your healthcare provider, or as advised.   When to seek medical advice  Call your healthcare provider if you have any of the following:  · Fever over 100.4°F (38.ºC) with worsening joint pain  · Increasing redness around the joint  · Pain developing in another joint  · Repeated vomiting, abdominal pain, or blood in the vomit or stool (black or red color)  Date Last Reviewed: 3/1/2017  © 3566-0600 [employer]. [address] [place] [address]. All rights reserved. This information is not intended as a substitute for professional medical care. Always follow your healthcare professional's instructions.        Gout Diet  Gout is a painful condition caused by an excess of uric acid, a waste product made by the body. Uric acid forms crystals that collect in the joints. The immune response to these crystals brings on symptoms of joint pain and swelling. This is called a gout attack. Often, medications and diet changes are combined to manage gout. Below are some guidelines for changing your diet to help you manage gout and prevent attacks. Your health care provider will help you determine the best eating plan for you.     Eating to manage gout  Weight loss for those who are overweight may help reduce gout attacks.  Eat less of these foods  Eating too many foods containing purines may raise the levels of uric acid in your body. This raises your risk for a gout attack. Try to limit these foods and  drinks:  · Alcohol, such as beer and red wine. You may be told to avoid alcohol completely.  · Soft drinks that contain sugar or high fructose corn syrup  · Certain fish, including anchovies, sardines, fish eggs, and herring  · Shellfish  · Certain meats, such as red meat, hot dogs, luncheon meats, and turkey  · Organ meats, such as liver, kidneys, and sweetbreads  · Legumes, such as dried beans and peas  · Other high fat foods such as gravy, whole milk, and high fat cheeses  · Vegetables such as asparagus, cauliflower, spinach, and mushrooms used to be thought to contribute to an increased risk for a gout attack, but recent studies show that high purine vegetables don't increase the risk for a gout attack.  Eat more of these foods  Other foods may be helpful for people with gout. Add some of these foods to your diet:  · Cherries contain chemicals that may lower uric acid.  · Omega fatty acids. These are found in some fatty fish such as salmon, certain oils (flax, olive, or nut), and nuts themselves. Omega fatty acids may help prevent inflammation due to gout.  · Dairy products that are low-fat or fat-free, such as cheese and yogurt  · Complex carbohydrate foods, including whole grains, brown rice, oats, and beans  · Coffee, in moderation  · Water, approximately 64 ounces per day  Follow-up care  Follow up with your healthcare provider as advised.  When to seek medical advice  Call your healthcare provider right away if any of these occur:  · Return of gout symptoms, usually at night:  · Severe pain, swelling, and heat in a joint, especially the base of the big toe  · Affected joint is hard to move  · Skin of the affected joint is purple or red  · Fever of 100.4°F (38°C) or higher  · Pain that doesn't get better even with prescribed medicine   Date Last Reviewed: 1/12/2016  © 4885-7901 [employer]. [address] [place] [address], [place] [address]. All rights reserved. This information is not intended as a  substitute for professional medical care. Always follow your healthcare professional's instructions.        Gout Diet  Gout is a painful condition caused by an excess of uric acid, a waste product made by the body. Uric acid forms crystals that collect in the joints. The immune response to these crystals brings on symptoms of joint pain and swelling. This is called a gout attack. Often, medications and diet changes are combined to manage gout. Below are some guidelines for changing your diet to help you manage gout and prevent attacks. Your health care provider will help you determine the best eating plan for you.     Eating to manage gout  Weight loss for those who are overweight may help reduce gout attacks.  Eat less of these foods  Eating too many foods containing purines may raise the levels of uric acid in your body. This raises your risk for a gout attack. Try to limit these foods and drinks:  · Alcohol, such as beer and red wine. You may be told to avoid alcohol completely.  · Soft drinks that contain sugar or high fructose corn syrup  · Certain fish, including anchovies, sardines, fish eggs, and herring  · Shellfish  · Certain meats, such as red meat, hot dogs, luncheon meats, and turkey  · Organ meats, such as liver, kidneys, and sweetbreads  · Legumes, such as dried beans and peas  · Other high fat foods such as gravy, whole milk, and high fat cheeses  · Vegetables such as asparagus, cauliflower, spinach, and mushrooms used to be thought to contribute to an increased risk for a gout attack, but recent studies show that high purine vegetables don't increase the risk for a gout attack.  Eat more of these foods  Other foods may be helpful for people with gout. Add some of these foods to your diet:  · Cherries contain chemicals that may lower uric acid.  · Omega fatty acids. These are found in some fatty fish such as salmon, certain oils (flax, olive, or nut), and nuts themselves. Omega fatty acids may help  prevent inflammation due to gout.  · Dairy products that are low-fat or fat-free, such as cheese and yogurt  · Complex carbohydrate foods, including whole grains, brown rice, oats, and beans  · Coffee, in moderation  · Water, approximately 64 ounces per day  Follow-up care  Follow up with your healthcare provider as advised.  When to seek medical advice  Call your healthcare provider right away if any of these occur:  · Return of gout symptoms, usually at night:  · Severe pain, swelling, and heat in a joint, especially the base of the big toe  · Affected joint is hard to move  · Skin of the affected joint is purple or red  · Fever of 100.4°F (38°C) or higher  · Pain that doesn't get better even with prescribed medicine   Date Last Reviewed: 1/12/2016  © 5804-5625 [employer]. [address] [place] [address]. All rights reserved. This information is not intended as a substitute for professional medical care. Always follow your healthcare professional's instructions.        Preventing Falls: Are You At Risk of Falling?     Ask for help to reduce risk of falling in your home.     As you get older, you're not as steady on your feet as you once were. And you may have health problems you didn't have when you were younger. So, it's not surprising that older people are more likely to trip and fall. Falling can be very serious. It can change your overall health and quality of life. That's why it's important to be aware of your own risk of falling.  The dangers of falling  Falls are one of the main causes of injury in people over age 65. An older person who falls may take longer to get better than a younger person. And, after a fall, an older person is more likely to have problems that don't go away. So, preventing falls can help you avoid serious health problems.  Are you at risk of falling?  Answer these questions to rate your level of risk.  · Are you a woman?  · Have you fallen or stumbled in the  "last year?  · Are you over age 65?  · Are you ever dizzy or lightheaded with standing?  · Do you have a hard time getting in and out of the bathtub or on and off the toilet?  · Do you lean on objects to help you get around? Or do you use a cane or walker?  · Do you have vision or hearing problems? For example, do you need new glasses or hearing aids?  · Do you have 2 or more long-lasting (chronic) medical conditions?  · Do you take 3 or more medicines?  · Have you felt depressed recently?  · Have you had more trouble with your memory in recent months?  · Are there hazards in your home that might cause you to fall, such as loose rugs or poor lighting?  · Do you have a pet that jumps on you or might trip you?  · Have you stopped getting regular exercise?  · Do you have diabetes?   · Do you have a neurologic disease, such as Parkinson or Alzheimer disease?   · Do you drink alcohol?  · Do you wear athletic shoes or slippers, or go barefoot at home?  You can help prevent falls  If you answered "yes" to any of the above questions, you should take steps to reduce your risk of a fall. Monitoring health conditions and keeping walkways in your home free of clutter are just 2 ways. Changing is sometimes easier said than done. But keep in mind that even small changes can make you less likely to fall.  The fear of falling  It's normal to be scared of falling, especially if you've fallen before. But being afraid can actually make you more likely to fall. This is because:  · Fear might cause you to become less active. Being less active can lead to a loss of strength and balance.  · Fear can lead to isolation from others, depression, or the use of more medicines or alcohol. And all these things make falling even more likely.  To break the cycle, learn more about ways to avoid falling. As you take control, you may find yourself feeling less afraid.   Date Last Reviewed: 6/12/2015  © 1113-8545 [employer]. [address] [place] [address]" [place] [address]. All rights reserved. This information is not intended as a substitute for professional medical care. Always follow your healthcare professional's instructions.        Preventing Falls: Making Changes in Your Living Space  Is your living space filled with hazards that could cause you to fall? Changes can make you safer. They could even save your life. Take a careful look around your home. Change what you can on your own. Hire someone or ask friends or family to help with harder tasks.      Be sure to add a nonslip mat to the inside of your shower or bathtub. Always keep a nightlight on. Keep a clear path from your bed to the bathroom. Move items from higher shelves to lower ones.   Remove hazards  · Remove things that can trip you, like throw rugs, boxes, piles of paper, or cords.  · Nail down rugs or carpeting if you don't want to remove them.  · Don't store items on stairs.  · Keep walkways clear.  · Clean up spills right away.  · Replace glass tables with wooden ones. They're safer if you fall.  Add safety devices  · Add handrails to both sides of stairs.  · Buy a raised toilet seat.  · Add grab bars near the toilet and in the shower.  · Get grabbers to help you reach things and avoid climbing.  Improve lighting  · Add nightlights to halls, bedrooms, and bathrooms.   · Put light switches at the top and bottom of stairs.  · Be sure each room and flight of stairs has proper lighting.  · Use shades or curtains to cut glare from windows.  · Put flashlights in each room. Replace burned-out bulbs.  · Get glowing light switches for room entrances.  Take other precautions  · Use nonskid floor wax.  · Buy a nonslip mat and a liquid soap dispenser for the shower.  · Tack down carpets or use slip-resistant backing.  · Put most-used items within easy reach.  · Add bright paint or tape on the top front edge of steps.  · Save big jobs, such as moving furniture or other heavy objects, for family or  friends.  · Get professional help installing grab bars. They can be unsafe if not installed the right way.  Fix riskier rooms first  Don't tackle everything at once. Focus on one room at a time. The bathroom is a common spot for falls, so you may start there. Or start with a room you spend lots of time in, such as your bedroom. Make only a few changes at once. This will give you time to adjust to them.     Outside your home  You might arrange for these changes yourself, or you might need to talk to your  or homeowners' association about them.  · Have loose boards on porches or damaged stairs repaired.  · Have rough edges, holes, or large cracks in sidewalks or driveways repaired.  · Have hazards that could trip you, such as hoses or marie, removed.  · Use high-wattage light bulbs (100 or greater) near outside doors and stairs.  · Get handrails added to outside stairs. Have them extend beyond the bottom step.  · Get help in winter weather with ice or snow removal.   Date Last Reviewed: 6/12/2015  © 6726-2389 [employer]. [address] [place] [address], [place] [address]. All rights reserved. This information is not intended as a substitute for professional medical care. Always follow your healthcare professional's instructions.

## 2018-12-07 ENCOUNTER — TELEPHONE (OUTPATIENT)
Dept: TRANSPLANT | Facility: CLINIC | Age: 78
End: 2018-12-07

## 2018-12-07 NOTE — TELEPHONE ENCOUNTER
----- Message from Garcia Logan sent at 12/7/2018  3:25 PM CST -----  Contact: Jayne luna/ Tonny  Needs Advice    Reason for call: Calling to check pt status        Communication Preference: [phone]    Additional Information: N/A

## 2018-12-13 ENCOUNTER — OUTPATIENT CASE MANAGEMENT (OUTPATIENT)
Dept: ADMINISTRATIVE | Facility: OTHER | Age: 78
End: 2018-12-13

## 2018-12-13 ENCOUNTER — TELEPHONE (OUTPATIENT)
Dept: ADMINISTRATIVE | Facility: CLINIC | Age: 78
End: 2018-12-13

## 2018-12-14 ENCOUNTER — OFFICE VISIT (OUTPATIENT)
Dept: HEMATOLOGY/ONCOLOGY | Facility: CLINIC | Age: 78
End: 2018-12-14
Payer: MEDICARE

## 2018-12-14 ENCOUNTER — LAB VISIT (OUTPATIENT)
Dept: LAB | Facility: HOSPITAL | Age: 78
End: 2018-12-14
Attending: INTERNAL MEDICINE
Payer: MEDICARE

## 2018-12-14 VITALS
RESPIRATION RATE: 20 BRPM | SYSTOLIC BLOOD PRESSURE: 113 MMHG | HEART RATE: 78 BPM | DIASTOLIC BLOOD PRESSURE: 66 MMHG | BODY MASS INDEX: 28.87 KG/M2 | TEMPERATURE: 98 F | WEIGHT: 217.81 LBS | HEIGHT: 73 IN

## 2018-12-14 DIAGNOSIS — D64.9 ERYTHROPOIETIN (EPO) STIMULATING AGENT ANEMIA MANAGEMENT PATIENT: ICD-10-CM

## 2018-12-14 DIAGNOSIS — D63.8 ANEMIA OF CHRONIC DISEASE: ICD-10-CM

## 2018-12-14 DIAGNOSIS — Z79.899 ERYTHROPOIETIN (EPO) STIMULATING AGENT ANEMIA MANAGEMENT PATIENT: ICD-10-CM

## 2018-12-14 DIAGNOSIS — D63.1 ANEMIA IN CHRONIC KIDNEY DISEASE, UNSPECIFIED CKD STAGE: Primary | ICD-10-CM

## 2018-12-14 DIAGNOSIS — D50.9 IRON DEFICIENCY ANEMIA, UNSPECIFIED IRON DEFICIENCY ANEMIA TYPE: ICD-10-CM

## 2018-12-14 DIAGNOSIS — D80.9 IMMUNOGLOBULIN DEFICIENCY: ICD-10-CM

## 2018-12-14 DIAGNOSIS — N18.6 ANEMIA DUE TO CHRONIC KIDNEY DISEASE, ON CHRONIC DIALYSIS: ICD-10-CM

## 2018-12-14 DIAGNOSIS — N18.9 ANEMIA IN CHRONIC KIDNEY DISEASE, UNSPECIFIED CKD STAGE: Primary | ICD-10-CM

## 2018-12-14 DIAGNOSIS — Z99.2 ANEMIA DUE TO CHRONIC KIDNEY DISEASE, ON CHRONIC DIALYSIS: ICD-10-CM

## 2018-12-14 DIAGNOSIS — R53.1 WEAKNESS: ICD-10-CM

## 2018-12-14 DIAGNOSIS — R06.09 DOE (DYSPNEA ON EXERTION): ICD-10-CM

## 2018-12-14 DIAGNOSIS — R73.9 HYPERGLYCEMIA: ICD-10-CM

## 2018-12-14 DIAGNOSIS — D63.1 ANEMIA DUE TO CHRONIC KIDNEY DISEASE, ON CHRONIC DIALYSIS: ICD-10-CM

## 2018-12-14 LAB
ALBUMIN SERPL BCP-MCNC: 3.6 G/DL
ALP SERPL-CCNC: 89 U/L
ALT SERPL W/O P-5'-P-CCNC: 23 U/L
ANION GAP SERPL CALC-SCNC: 8 MMOL/L
AST SERPL-CCNC: 23 U/L
BASOPHILS # BLD AUTO: 0 K/UL
BASOPHILS NFR BLD: 0.4 %
BILIRUB SERPL-MCNC: 0.5 MG/DL
BUN SERPL-MCNC: 32 MG/DL
CALCIUM SERPL-MCNC: 8.4 MG/DL
CHLORIDE SERPL-SCNC: 104 MMOL/L
CO2 SERPL-SCNC: 27 MMOL/L
CREAT SERPL-MCNC: 5.1 MG/DL
DIFFERENTIAL METHOD: ABNORMAL
EOSINOPHIL # BLD AUTO: 0.4 K/UL
EOSINOPHIL NFR BLD: 5.3 %
ERYTHROCYTE [DISTWIDTH] IN BLOOD BY AUTOMATED COUNT: 17.5 %
EST. GFR  (AFRICAN AMERICAN): 11 ML/MIN/1.73 M^2
EST. GFR  (NON AFRICAN AMERICAN): 10 ML/MIN/1.73 M^2
GLUCOSE SERPL-MCNC: 128 MG/DL
HCT VFR BLD AUTO: 37.1 %
HGB BLD-MCNC: 12.2 G/DL
LYMPHOCYTES # BLD AUTO: 1.7 K/UL
LYMPHOCYTES NFR BLD: 23.4 %
MCH RBC QN AUTO: 30.7 PG
MCHC RBC AUTO-ENTMCNC: 32.8 G/DL
MCV RBC AUTO: 94 FL
MONOCYTES # BLD AUTO: 0.5 K/UL
MONOCYTES NFR BLD: 6.1 %
NEUTROPHILS # BLD AUTO: 4.8 K/UL
NEUTROPHILS NFR BLD: 64.8 %
PLATELET # BLD AUTO: 181 K/UL
PMV BLD AUTO: 7.3 FL
POTASSIUM SERPL-SCNC: 3.5 MMOL/L
PROT SERPL-MCNC: 6.8 G/DL
RBC # BLD AUTO: 3.97 M/UL
SODIUM SERPL-SCNC: 139 MMOL/L
WBC # BLD AUTO: 7.4 K/UL

## 2018-12-14 PROCEDURE — 82787 IGG 1 2 3 OR 4 EACH: CPT | Mod: 59

## 2018-12-14 PROCEDURE — 99215 OFFICE O/P EST HI 40 MIN: CPT | Mod: PBBFAC,PO | Performed by: INTERNAL MEDICINE

## 2018-12-14 PROCEDURE — 99999 PR PBB SHADOW E&M-EST. PATIENT-LVL V: CPT | Mod: PBBFAC,,, | Performed by: INTERNAL MEDICINE

## 2018-12-14 PROCEDURE — 85025 COMPLETE CBC W/AUTO DIFF WBC: CPT

## 2018-12-14 PROCEDURE — 80053 COMPREHEN METABOLIC PANEL: CPT

## 2018-12-14 PROCEDURE — 99214 OFFICE O/P EST MOD 30 MIN: CPT | Mod: S$PBB,,, | Performed by: INTERNAL MEDICINE

## 2018-12-14 RX ORDER — GABAPENTIN 100 MG/1
CAPSULE ORAL
COMMUNITY
Start: 2018-11-18 | End: 2018-12-14 | Stop reason: DRUGHIGH

## 2018-12-14 NOTE — PROGRESS NOTES
CC : I had to start dialysis    Micheal Hunt is a 78 y.o.   Pt due for dialysis now   Pt is here for evaluation of anemia with CKD.  He has received  procrit in the past  His labs reveal continued anemia with a hemoglobin less than 10    He was anemic  in the hospital     AV graft in left arm : He  Has started dialysis and feels weak  HIstory pneumonia  He is seeing Dr Mihir goodman in follow up of pneumonia  I have reviewed records from the last hospitalization     Past Medical History:   Diagnosis Date    NICK (acute kidney injury) 7/29/2016    Allergy     Anemia, mild 12/15/2014    Arthritis     Gout    Benign essential HTN 3/27/2012    BMI 29.0-29.9,adult 5/10/2018    BPH (benign prostatic hyperplasia)     BPH (benign prostatic hyperplasia)     CAD (coronary artery disease) 2006    Chronic kidney disease     due to ibuprofen    Colon polyp     CRF (chronic renal failure), stage 5     Diverticulosis     Gastritis     GERD (gastroesophageal reflux disease)     Gout     History of colon polyps 5/3/2018    HTN (hypertension) 3/27/2012    Hyperlipidemia     Hyperlipidemia     LLL pneumonia 6/14/2018    LVH (left ventricular hypertrophy)     Mesenteric ischemia     Murmur, cardiac 3/27/2012    GRICELDA (obstructive sleep apnea)     DOES NOT USE A MACHINE    Sinus problem     Syncope and collapse      He is tolerating norvasc for HTN  He is tolerating proscar which is continuing to help with nocturia     Current Outpatient Medications:     albuterol (PROVENTIL/VENTOLIN HFA) 90 mcg/actuation inhaler, 2 puffs every 4 hours as needed for cough, wheeze, or shortness of breath, Disp: 3 Inhaler, Rfl: 3    albuterol-ipratropium (DUO-NEB) 2.5 mg-0.5 mg/3 mL nebulizer solution, INHALE 1 VIAL BY NEBULIZER EVERY 6 HOURS AS NEEDED FOR WHEEZING, Disp: 270 mL, Rfl: 0    allopurinol (ZYLOPRIM) 100 MG tablet, Take 1 tablet (100 mg total) by mouth once daily., Disp: 90 tablet, Rfl: 1    amLODIPine  (NORVASC) 10 MG tablet, Take 1 tablet (10 mg total) by mouth every evening., Disp: 90 tablet, Rfl: 1    aspirin (ECOTRIN) 81 MG EC tablet, Take 81 mg by mouth once daily.  , Disp: , Rfl:     atorvastatin (LIPITOR) 80 MG tablet, TAKE 1 TABLET (80 MG TOTAL) BY MOUTH ONCE DAILY., Disp: 90 tablet, Rfl: 3    budesonide-formoterol 160-4.5 mcg (SYMBICORT) 160-4.5 mcg/actuation HFAA, Inhale 2 puffs into the lungs every 12 (twelve) hours. Controller, Disp: 3 Inhaler, Rfl: 4    carvedilol (COREG) 6.25 MG tablet, Take 1 tablet (6.25 mg total) by mouth 2 (two) times daily with meals., Disp: 60 tablet, Rfl: 3    colchicine (COLCRYS) 0.6 mg tablet, TAKE 1 TABLET (0.6 MG TOTAL) BY MOUTH ONCE DAILY., Disp: 30 tablet, Rfl: 1    finasteride (PROSCAR) 5 mg tablet, TAKE 1 TABLET ONCE DAILY., Disp: 90 tablet, Rfl: 3    fluticasone (FLONASE) 50 mcg/actuation nasal spray, 2 sprays (100 mcg total) by Each Nare route once daily., Disp: 3 Bottle, Rfl: 3    gabapentin (NEURONTIN) 300 MG capsule, Take 1 capsule (300 mg total) by mouth every evening., Disp: 90 capsule, Rfl: 3    isosorbide mononitrate (ISMO,MONOKET) 10 mg tablet, TAKE 1 TABLET BY MOUTH EVERY DAY IN THE EVENING, Disp: 30 tablet, Rfl: 3    losartan (COZAAR) 100 MG tablet, Take 1 tablet (100 mg total) by mouth once daily., Disp: 90 tablet, Rfl: 0    meclizine (ANTIVERT) 25 mg tablet, TAKE 1 TABLET (25 MG TOTAL) BY MOUTH 3 (THREE) TIMES DAILY AS NEEDED., Disp: 90 tablet, Rfl: 0    mirtazapine (REMERON) 7.5 MG Tab, Take 1 tablet (7.5 mg total) by mouth every evening., Disp: 30 tablet, Rfl: 2    NITROSTAT 0.4 mg SL tablet, PLACE 1 TABLET (0.4 MG TOTAL) UNDER THE TONGUE EVERY 5 (FIVE) MINUTES AS NEEDED FOR CHEST PAIN., Disp: 25 tablet, Rfl: 3    omeprazole (PRILOSEC) 20 MG capsule, Take 1 capsule (20 mg total) by mouth once daily., Disp: 90 capsule, Rfl: 1    polyethylene glycol (GLYCOLAX) 17 gram/dose powder, Take 17 g by mouth 2 (two) times daily before meals., Disp:  "1 Bottle, Rfl: 4    sodium bicarbonate 650 MG tablet, Take 1 tablet (650 mg total) by mouth 3 (three) times daily., Disp: 90 tablet, Rfl: 0    sodium chloride (SALINE NASAL MIST) 3 % Mist, 1 spray by Nasal route 2 (two) times daily., Disp: , Rfl:     tamsulosin (FLOMAX) 0.4 mg Cap, Take 1 capsule (0.4 mg total) by mouth once daily., Disp: 30 capsule, Rfl: 2    torsemide (DEMADEX) 20 MG Tab, Take 2 tablets (40 mg total) by mouth once daily., Disp: 60 tablet, Rfl: 5    traMADol (ULTRAM) 50 mg tablet, Take 1 tablet (50 mg total) by mouth every 12 (twelve) hours as needed for Pain., Disp: 60 tablet, Rfl: 2     He is tolerating Diovan for hypertension continues taking Ambien to help with insomnia    CONSTITUTIONAL: No fevers, chills, night sweats, + fatigue   SKIN: no rashes or itching  ENT: No headaches, head trauma, vision changes, or eye pain  LYMPH NODES: None noticed   CV: No chest pain, palpitations.   RESP:+ dyspnea on exertion,no cough, wheezing, or hemoptysis  GI: No nausea, emesis, diarrhea, constipation, melena, hematochezia, pain.   : No dysuria, hematuria, urgency, or frequency   HEME: No easy bruising, bleeding problems  PSYCHIATRIC: No depression, anxiety, hallucinations.  NEURO: No seizures, memory loss, ++ weakness  No difficulty sleeping  MSK: No arthralgias or joint swelling    Depression screening negative        /66   Pulse 78   Temp 97.9 °F (36.6 °C)   Resp 20   Ht 6' 1" (1.854 m)   Wt 98.8 kg (217 lb 13 oz)   BMI 28.74 kg/m²   Constitutional: oriented to person, place, and time.    HENT:   Head: bilateral bruises periorbital , cut over his lip   Right Ear: External ear normal.   Left Ear: External ear normal.   Nose: Nose normal.   Mouth/Throat: Oropharynx is clear and moist.   Eyes: Conjunctivae and EOM are normal. Pupils are equal, round  Neck: Normal range of motion.No thyromegaly present.   Cardiovascular: Normal rate, regular rhythm, normal heart sounds and intact distal " pulses.    ++ murmur heard.  Pulmonary/Chest: Decreased breath sounds base   no wheezes.  no rales.   Abdominal: Soft. Bowel sounds are normal.  no mass.There is no rebound.   Musculoskeletal:   no edema  + limited ROM and swelling of left wrist tenderness.   Lymphadenopathy:    no cervical adenopathy.   Neurological: alert and oriented to person, place, and time.   Psychiatric: normal mood and affect.   behavior is normal.   Vitals reviewed.    Lab Results   Component Value Date    WBC 7.40 12/14/2018    HGB 12.2 (L) 12/14/2018    HCT 37.1 (L) 12/14/2018    MCV 94 12/14/2018     12/14/2018     CMP  Sodium   Date Value Ref Range Status   12/14/2018 139 136 - 145 mmol/L Final     Potassium   Date Value Ref Range Status   12/14/2018 3.5 3.5 - 5.1 mmol/L Final     Chloride   Date Value Ref Range Status   12/14/2018 104 95 - 110 mmol/L Final     CO2   Date Value Ref Range Status   12/14/2018 27 23 - 29 mmol/L Final     Glucose   Date Value Ref Range Status   12/14/2018 128 (H) 70 - 110 mg/dL Final     BUN, Bld   Date Value Ref Range Status   12/14/2018 32 (H) 8 - 23 mg/dL Final     Creatinine   Date Value Ref Range Status   12/14/2018 5.1 (H) 0.5 - 1.4 mg/dL Final   08/08/2013 1.5 (H) 0.5 - 1.4 mg/dL Final     Calcium   Date Value Ref Range Status   12/14/2018 8.4 (L) 8.7 - 10.5 mg/dL Final   08/08/2013 9.0 8.7 - 10.5 mg/dL Final     Total Protein   Date Value Ref Range Status   12/14/2018 6.8 6.0 - 8.4 g/dL Final     Albumin   Date Value Ref Range Status   12/14/2018 3.6 3.5 - 5.2 g/dL Final   09/12/2013 4.3 3.6 - 5.1 g/dL Final     Comment:     @ Test Performed By:  YaDataphill Baldwin M.D., VANDAAP.,   [address] [place] [id number]  [facility] #[id number]     Total Bilirubin   Date Value Ref Range Status   12/14/2018 0.5 0.1 - 1.0 mg/dL Final     Comment:     For infants and newborns, interpretation of results should be based  on gestational age, weight  and in agreement with clinical  observations.  Premature Infant recommended reference ranges:  Up to 24 hours.............<8.0 mg/dL  Up to 48 hours............<12.0 mg/dL  3-5 days..................<15.0 mg/dL  6-29 days.................<15.0 mg/dL       Alkaline Phosphatase   Date Value Ref Range Status   12/14/2018 89 55 - 135 U/L Final     AST   Date Value Ref Range Status   12/14/2018 23 10 - 40 U/L Final     ALT   Date Value Ref Range Status   12/14/2018 23 10 - 44 U/L Final     Anion Gap   Date Value Ref Range Status   12/14/2018 8 8 - 16 mmol/L Final   08/08/2013 11 5 - 15 meq/L Final     eGFR if    Date Value Ref Range Status   12/14/2018 11 (A) >60 mL/min/1.73 m^2 Final     eGFR if non    Date Value Ref Range Status   12/14/2018 10 (A) >60 mL/min/1.73 m^2 Final     Comment:     Calculation used to obtain the estimated glomerular filtration  rate (eGFR) is the CKD-EPI equation.            Anemia in chronic kidney disease, unspecified CKD stage  -     Iron and TIBC; Future; Expected date: 12/14/2018  -     CBC auto differential; Future; Expected date: 12/14/2018    Anemia due to chronic kidney disease, on chronic dialysis    Hyperglycemia    Weakness    ARMAS (dyspnea on exertion)    Erythropoietin (EPO) stimulating agent anemia management patient      No need for procrit  Now on dialysis    Explained how procrit is indicated to help with anemia especially now pt is on dialysis  He may need IV iorn intermittently depedsing on his hemoglobin levels in the future  RTC 4 weeks with cbc and iron   See Dr Guillen for monitoring of pneumonia   He is to continue usual medications for hypertension being calcium channel blocker  Continue Neurontin for intermittent paresthesias   If pneumonia does not resolve completely he may need IVIG for low hypogammaglobulinemia     Thank you for allowing me to evaluate and participate in the care of this pleasant patient. Please fell free to call me  with any questions or concerns.    [place],  Mikala Wells MD    This note was dictated with Dragon and briefly proofread. Please excuse any sentences that may be unclear or words misspelled

## 2018-12-17 LAB
IGG1 SER-MCNC: 686 MG/DL
IGG2 SER-MCNC: 261 MG/DL
IGG3 SER-MCNC: 78 MG/DL
IGG4 SER-MCNC: 16 MG/DL

## 2018-12-20 ENCOUNTER — OUTPATIENT CASE MANAGEMENT (OUTPATIENT)
Dept: ADMINISTRATIVE | Facility: OTHER | Age: 78
End: 2018-12-20
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
## 2018-12-27 ENCOUNTER — LAB VISIT (OUTPATIENT)
Dept: LAB | Facility: HOSPITAL | Age: 78
End: 2018-12-27
Attending: INTERNAL MEDICINE
Payer: MEDICARE

## 2018-12-27 DIAGNOSIS — N18.4 CHRONIC KIDNEY DISEASE, STAGE IV (SEVERE): ICD-10-CM

## 2018-12-27 DIAGNOSIS — N40.0 BENIGN ENLARGEMENT OF PROSTATE: ICD-10-CM

## 2018-12-27 DIAGNOSIS — N18.30 CHRONIC KIDNEY DISEASE, STAGE III (MODERATE): ICD-10-CM

## 2018-12-27 DIAGNOSIS — N18.5 CHRONIC KIDNEY DISEASE, STAGE V: ICD-10-CM

## 2018-12-27 DIAGNOSIS — I25.10 CORONARY ATHEROSCLEROSIS OF NATIVE CORONARY ARTERY: Primary | ICD-10-CM

## 2018-12-27 LAB
ALBUMIN SERPL BCP-MCNC: 3.9 G/DL
ALP SERPL-CCNC: 97 U/L
ALT SERPL W/O P-5'-P-CCNC: 19 U/L
ANION GAP SERPL CALC-SCNC: 14 MMOL/L
AST SERPL-CCNC: 20 U/L
BASOPHILS # BLD AUTO: 0 K/UL
BASOPHILS NFR BLD: 0.5 %
BILIRUB SERPL-MCNC: 0.4 MG/DL
BUN SERPL-MCNC: 37 MG/DL
CALCIUM SERPL-MCNC: 9.6 MG/DL
CHLORIDE SERPL-SCNC: 95 MMOL/L
CO2 SERPL-SCNC: 32 MMOL/L
CREAT SERPL-MCNC: 5.3 MG/DL
DIFFERENTIAL METHOD: ABNORMAL
EOSINOPHIL # BLD AUTO: 0.3 K/UL
EOSINOPHIL NFR BLD: 6.3 %
ERYTHROCYTE [DISTWIDTH] IN BLOOD BY AUTOMATED COUNT: 16.6 %
EST. GFR  (AFRICAN AMERICAN): 11 ML/MIN/1.73 M^2
EST. GFR  (NON AFRICAN AMERICAN): 9 ML/MIN/1.73 M^2
GLUCOSE SERPL-MCNC: 114 MG/DL
HCT VFR BLD AUTO: 41.9 %
HGB BLD-MCNC: 13.7 G/DL
LYMPHOCYTES # BLD AUTO: 1.7 K/UL
LYMPHOCYTES NFR BLD: 30.8 %
MCH RBC QN AUTO: 30.7 PG
MCHC RBC AUTO-ENTMCNC: 32.8 G/DL
MCV RBC AUTO: 94 FL
MONOCYTES # BLD AUTO: 0.4 K/UL
MONOCYTES NFR BLD: 7.8 %
NEUTROPHILS # BLD AUTO: 3 K/UL
NEUTROPHILS NFR BLD: 54.6 %
PHOSPHATE SERPL-MCNC: 4.5 MG/DL
PLATELET # BLD AUTO: 249 K/UL
PMV BLD AUTO: 7.7 FL
POTASSIUM SERPL-SCNC: 3.4 MMOL/L
PROT SERPL-MCNC: 7.4 G/DL
RBC # BLD AUTO: 4.47 M/UL
SODIUM SERPL-SCNC: 141 MMOL/L
WBC # BLD AUTO: 5.5 K/UL

## 2018-12-27 PROCEDURE — 80053 COMPREHEN METABOLIC PANEL: CPT

## 2018-12-27 PROCEDURE — 84100 ASSAY OF PHOSPHORUS: CPT

## 2018-12-27 PROCEDURE — 86704 HEP B CORE ANTIBODY TOTAL: CPT

## 2018-12-27 PROCEDURE — 36415 COLL VENOUS BLD VENIPUNCTURE: CPT

## 2018-12-27 PROCEDURE — 87516 HEPATITIS B DNA AMP PROBE: CPT

## 2018-12-27 PROCEDURE — 86706 HEP B SURFACE ANTIBODY: CPT

## 2018-12-27 PROCEDURE — 85025 COMPLETE CBC W/AUTO DIFF WBC: CPT | Mod: 91

## 2018-12-27 PROCEDURE — 87340 HEPATITIS B SURFACE AG IA: CPT

## 2018-12-31 LAB — HBV DNA SERPL QL NAA+PROBE: NOT DETECTED

## 2019-01-03 ENCOUNTER — OUTPATIENT CASE MANAGEMENT (OUTPATIENT)
Dept: ADMINISTRATIVE | Facility: OTHER | Age: 79
End: 2019-01-03

## 2019-01-03 NOTE — PROGRESS NOTES
12/20/2018  Summary: RN-CM contacted patient to follow up for OPCM. Spoke with patient via telephone. He reports that he received all the mailed resources that were mailed to him. He also reports that he spoke with the OPCM-SW this morning and completed the assessment with her. He states that his transportation issues have resolved since we last spoke as he is now going to Dialysis in [place] which is much closer to his home than his previous dialysis center. He also had to change his dialysis days to T,Th,S. He states that his dialysis access (AV Fistula in Left Upper Arm) is continuing to function well.   He states that for now, he will continue to go to dialysis 3 times per week. He is aware of the peritoneal dialysis option but has decided not to pursue that at this time. Encouraged continued compliance with dialysis. We reviewed the [facility] information on Hemodialysis/Hemodialysis Access and Peritoneal Dialysis. Patient denied any questions related to Peritoneal Dialysis. We went over the signs and symptoms of problems with Hemodialysis Access that need to be called to your healthcare provider right away: Cant feel the blood flowing in the access (this sensation is called a thrill); Have pain or numbness in your hand or arm; Have bleeding, redness, bluish discoloration, or warmth around your access; Notice your access suddenly bulging out more than usual (a slight bulge is normal); Have a fever of 100.4°F (38°C) or higher, or as directed by your healthcare provider. Patient verbalized understanding and denied any S/S at this time. We went over the ways to help prevent problems with Hemodialysis Access: Dont wear tight clothes or jewelry around your access; Dont let anyone take your blood pressure on or draw blood from the arm with the access; Dont let anyone put IV lines into it; Protect your access from being hit or cut; Wash your hands often and keep the area around the access clean; Do not carry  anything heavy or do anything that would put pressure on the access. Patient verbalized understanding. Patient denied any recent falls and continues to ambulate without assistance. We reviewed the Fall Prevention safety recommendations:    Remove things that can trip you, like throw rugs, boxes, piles of paper, or cords. - Done   Nail down rugs or carpeting if you don't want to remove them.   Keep walkways clear. - Done   Clean up spills right away. -  Done   Replace glass tables with wooden ones. They're safer if you fall.   Buy a raised toilet seat.   Add grab bars near the toilet and in the shower.   Get grabbers to help you reach things and avoid climbing.   Add nightlights to halls, bedrooms, and bathrooms. - Done   Be sure each room has proper lighting. - Done   Use shades or curtains to cut glare from windows.   Put flashlights in each room. Replace burned-out bulbs. - Done   Get glowing light switches for room entrances.   Use nonskid floor wax.   Buy a nonslip mat for the shower. - Done   Tack down carpets or use slip-resistant backing.    Put most-used items within easy reach. - Done   Add bright paint or tape on the top front edge of steps.   Save big jobs, such as moving furniture or other heavy objects, for family or friends.   Get professional help installing grab bars. They can be unsafe if not installed the right way.  Patient reports compliance with 8 safety recommendations and will consider the others if needed. Patient states that he has returned to physical therapy and feels that it is helping him. He states that he will continue to exercise in order to stay as active and mobile as possible. Patient expressed appreciation for outreach and is in agreement with follow up call in 2 weeks due to the Christmas Holidays. Will continue to follow. Juma Ruelas, RN-OPCM    Interventions:  - Reviewed the [facility] information on Hemodialysis/Hemodialysis Access and Peritoneal Dialysis.  - Reviewed signs and  symptoms of problems with Hemodialysis Access that need to be called to your healthcare provider right away.  - Reviewed the ways to help prevent problems with Hemodialysis Access.  - Reviewed patient's upcoming scheduled appointments.   - Empowered patient/caregiver to discuss treatment plan with Physician/care team.  - Encouraged compliance with Physician follow-ups.   - Encouraged compliance with preventive screenings.   - Encouraged compliance with scheduled laboratory testing and procedure.  - Encouraged Dietary Compliance (Renal Diet, Low Sodium).   - Encouraged Exercise as tolerated.   - Encourage Medication Compliance.    - Encouraged compliance with annual vaccinations.  - Encouraged compliance with dialysis.     Plan:  - Assess for receipt of mailed resources. - 12/20/18 Received  - Assess for receipt/completion of Advance Directive/POA paperwork.- 12/20/18 Received, completed, has not brought paperwork in to clinic yet  - Assess for contact with OPCM-LCSW for identified barriers. - SW assessment completed 12/20/18  - Continue to coordinate care with OPCM-LCSW for identified needs.   - Hemodialysis/Hemodialysis Access - Review educational information with patient. - Done 12/20/18  - Peritoneal Dialysis - Review educational information with patient. - Done 12/20/18  - Diet for Chronic Renal Disease - Review educational information with patient.  - Gout - Review educational information with patient.  - Hypertension - Review educational information with patient.  - Healthcare Associated Pneumonia - Review educational information with patient.  - Fall Prevention - Review educational information with patient. - Done 12/20/18    [facility] was reviewed with the patients input and was based on identified barriers from todays assessment.  Goals were reviewed today with the patient and the patient has agreed to work towards these goals to improve his overall well-being. Patient  verbalized understanding of the care plan, goals, and all of today's instructions. Encouraged patient to communicate with his physician and health care team about health conditions and the treatment plan.  Provided my contact information today and encouraged patient to call me with any questions as needed.     Clinical Reference Documents Added to Patient Instructions       Document    BALANCING CALCIUM AND PHOSPHORUS, KIDNEY DISEASE (ENGLISH)    CHRONIC KIDNEY DISEASE, DIET FOR (ENGLISH)    FALLS, PREVENTING, ARE YOU AT RISK OF FALLING? (ENGLISH)    FALLS, PREVENTING, MAKING CHANGES IN YOUR LIVING SPACE (ENGLISH)    GOUT (ENGLISH)    GOUT ATTACKS, TREATING (ENGLISH)    GOUT DIET (ENGLISH)    GOUT, WHAT IS (ENGLISH)    HEMODIALYSIS (ENGLISH)    HEMODIALYSIS ACCESS BLEEDING (ENGLISH)    HEMODIALYSIS ACCESS, CARING FOR YOUR (ENGLISH)    HYPERTENSION, ESTABLISHED (ENGLISH)    KIDNEY DISEASE, HIGH BLOOD PRESSURE AND (ENGLISH)    KIDNEY DISEASE, LIVING WITH HIGH BLOOD PRESSURE AND (ENGLISH)    KIDNEY FAILURE, TREATMENT OPTIONS FOR (ENGLISH)    KIDNEY FAILURE: YOUR HEALTHCARE TEAM (ENGLISH)    PD CATHETER AND EXIT SITE, CARING FOR YOUR (ENGLISH)    PD PERITONEAL DIALYSIS (ENGLISH)    PLACING THE CATHETER, PD CATHETER ACCESS (ENGLISH)    PNEUMONIA, HEALTHCARE-ASSOCIATED (ENGLISH)    REDUCING POTASSIUM IN FOODS, KIDNEY DISEASE (ENGLISH)    SODIUM, EATING LESS: KIDNEY DISEASE (ENGLISH)

## 2019-01-03 NOTE — PROGRESS NOTES
Summary: (1st Attempt)  RN-ASPEN attempted to contact patient to follow up for OPCM. Left message requesting return call. Will attempt to contact patient at a later date. - JARVIS Kumar RN-OPCM    Interventions:  - Left message requesting return call.  - Scheduled patient for follow up next week.    Plan:  - Assess for receipt of mailed resources.  - Assess for contact with OPCM-LCSW for identified barriers.  - Hemodialysis/Hemodialysis Access - Review educational information with patient.  - Peritoneal Dialysis - Review educational information with patient.  - Diet for Chronic Renal Disease - Review educational information with patient.  - Gout - Review educational information with patient.  - Hypertension - Review educational information with patient.  - Healthcare Associated Pneumonia - Review educational information with patient.  - Fall Prevention - Review educational information with patient.

## 2019-01-07 ENCOUNTER — TELEPHONE (OUTPATIENT)
Dept: PHYSICAL MEDICINE AND REHAB | Facility: CLINIC | Age: 79
End: 2019-01-07

## 2019-01-07 ENCOUNTER — TELEPHONE (OUTPATIENT)
Dept: PHARMACY | Facility: AMBULARY SURGERY CENTER | Age: 79
End: 2019-01-07

## 2019-01-07 NOTE — PROGRESS NOTES
Micheal Hunt was seen 6/28/18 for a BPPV evaluation.    VAST was negative right and negative left   Head Right Positional was negative  Head Left Positional was negative  Hallpike Right was negative for BPPV but revealed 3 d/s RB nystagmus   Hallpike Left was negative for BPPV but revealed square waveform nystagmus      Impression: Negative for BPPV bilaterally.      Recommendations:   - VNG and CDP

## 2019-01-07 NOTE — PROGRESS NOTES
01/03/2019  Summary: (1st Attempt)  RN-ASPEN attempted to contact patient to follow up for OPCM. Left message requesting return call. Will attempt to contact patient at a later date. - JARVIS Kumar RN-OPCM    Interventions:  - Left message requesting return call.  - Scheduled patient for follow up next week.    Plan:  - Assess for receipt of mailed resources. - 12/20/18 Received  - Assess for receipt/completion of Advance Directive/POA paperwork.- 12/20/18 Received, completed, has not brought paperwork in to clinic yet  - Assess for contact with OPCM-LCSW for identified barriers. - SW assessment completed 12/20/18  - Continue to coordinate care with OPCM-LCSW for identified needs.   - Hemodialysis/Hemodialysis Access - Review educational information with patient. - Done 12/20/18  - Peritoneal Dialysis - Review educational information with patient. - Done 12/20/18  - Diet for Chronic Renal Disease - Review educational information with patient.  - Gout - Review educational information with patient.  - Hypertension - Review educational information with patient.  - Healthcare Associated Pneumonia - Review educational information with patient.  - Fall Prevention - Review educational information with patient. - Done 12/20/18  - Assess for any additional needs.

## 2019-01-07 NOTE — TELEPHONE ENCOUNTER
----- Message from Nicole Rosario sent at 1/7/2019  8:22 AM CST -----  Contact: [employer]  Message from [employer], System Message sent at 1/5/2019  9:20 AM CST -----    Appointment Request From: Micheal Hunt    With Provider: Yanira Whitehead DO [[facility]]    Preferred Date Range: Any    Preferred Times: Tuesday Morning, Thursday Morning    Reason for visit: Existing Patient    Comments:  pain  in  left wirst

## 2019-01-08 ENCOUNTER — OFFICE VISIT (OUTPATIENT)
Dept: PHYSICAL MEDICINE AND REHAB | Facility: CLINIC | Age: 79
End: 2019-01-08
Payer: MEDICARE

## 2019-01-08 ENCOUNTER — OUTPATIENT CASE MANAGEMENT (OUTPATIENT)
Dept: ADMINISTRATIVE | Facility: OTHER | Age: 79
End: 2019-01-08

## 2019-01-08 VITALS
WEIGHT: 217 LBS | DIASTOLIC BLOOD PRESSURE: 62 MMHG | BODY MASS INDEX: 28.76 KG/M2 | HEART RATE: 56 BPM | HEIGHT: 73 IN | SYSTOLIC BLOOD PRESSURE: 92 MMHG

## 2019-01-08 DIAGNOSIS — M24.20 LIGAMENT LAXITY: Primary | ICD-10-CM

## 2019-01-08 DIAGNOSIS — G47.9 SLEEP DISORDER: ICD-10-CM

## 2019-01-08 DIAGNOSIS — R20.0 HAND NUMBNESS: ICD-10-CM

## 2019-01-08 PROCEDURE — 99214 OFFICE O/P EST MOD 30 MIN: CPT | Mod: S$PBB,,, | Performed by: PHYSICAL MEDICINE & REHABILITATION

## 2019-01-08 PROCEDURE — 99215 OFFICE O/P EST HI 40 MIN: CPT | Mod: PBBFAC,PN | Performed by: PHYSICAL MEDICINE & REHABILITATION

## 2019-01-08 PROCEDURE — 99999 PR PBB SHADOW E&M-EST. PATIENT-LVL V: ICD-10-PCS | Mod: PBBFAC,,, | Performed by: PHYSICAL MEDICINE & REHABILITATION

## 2019-01-08 PROCEDURE — 99999 PR PBB SHADOW E&M-EST. PATIENT-LVL V: CPT | Mod: PBBFAC,,, | Performed by: PHYSICAL MEDICINE & REHABILITATION

## 2019-01-08 PROCEDURE — 99214 PR OFFICE/OUTPT VISIT, EST, LEVL IV, 30-39 MIN: ICD-10-PCS | Mod: S$PBB,,, | Performed by: PHYSICAL MEDICINE & REHABILITATION

## 2019-01-08 RX ORDER — PREDNISONE 5 MG/1
5 TABLET ORAL DAILY
Qty: 7 TABLET | Refills: 0 | Status: SHIPPED | OUTPATIENT
Start: 2019-01-08 | End: 2019-01-15 | Stop reason: ALTCHOICE

## 2019-01-09 RX ORDER — ZOLPIDEM TARTRATE 5 MG/1
TABLET ORAL
Qty: 30 TABLET | Refills: 3 | Status: ON HOLD | OUTPATIENT
Start: 2019-01-09 | End: 2019-03-26 | Stop reason: SDUPTHER

## 2019-01-10 ENCOUNTER — OUTPATIENT CASE MANAGEMENT (OUTPATIENT)
Dept: ADMINISTRATIVE | Facility: OTHER | Age: 79
End: 2019-01-10

## 2019-01-10 ENCOUNTER — TELEPHONE (OUTPATIENT)
Dept: PHARMACY | Facility: AMBULARY SURGERY CENTER | Age: 79
End: 2019-01-10
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
## 2019-01-15 ENCOUNTER — HOSPITAL ENCOUNTER (OUTPATIENT)
Dept: RADIOLOGY | Facility: HOSPITAL | Age: 79
Discharge: HOME | End: 2019-01-15
Attending: PHYSICAL MEDICINE & REHABILITATION
Payer: MEDICARE

## 2019-01-15 ENCOUNTER — OFFICE VISIT (OUTPATIENT)
Dept: FAMILY MEDICINE | Facility: CLINIC | Age: 79
End: 2019-01-15
Payer: MEDICARE

## 2019-01-15 ENCOUNTER — OUTPATIENT CASE MANAGEMENT (OUTPATIENT)
Dept: ADMINISTRATIVE | Facility: OTHER | Age: 79
End: 2019-01-15

## 2019-01-15 VITALS
TEMPERATURE: 98 F | HEIGHT: 73 IN | SYSTOLIC BLOOD PRESSURE: 118 MMHG | OXYGEN SATURATION: 98 % | DIASTOLIC BLOOD PRESSURE: 62 MMHG | HEART RATE: 97 BPM | WEIGHT: 226.19 LBS | RESPIRATION RATE: 19 BRPM | BODY MASS INDEX: 29.98 KG/M2

## 2019-01-15 DIAGNOSIS — R60.0 BILATERAL LEG EDEMA: ICD-10-CM

## 2019-01-15 DIAGNOSIS — M24.20 LIGAMENT LAXITY: ICD-10-CM

## 2019-01-15 DIAGNOSIS — N18.5 CRF (CHRONIC RENAL FAILURE), STAGE 5: ICD-10-CM

## 2019-01-15 DIAGNOSIS — I10 ESSENTIAL HYPERTENSION: Primary | ICD-10-CM

## 2019-01-15 PROCEDURE — 73221 MRI JOINT UPR EXTREM W/O DYE: CPT | Mod: 26,LT,, | Performed by: RADIOLOGY

## 2019-01-15 PROCEDURE — 73221 MRI WRIST WITHOUT CONTRAST LEFT: ICD-10-PCS | Mod: 26,LT,, | Performed by: RADIOLOGY

## 2019-01-15 PROCEDURE — 73221 MRI JOINT UPR EXTREM W/O DYE: CPT | Mod: TC,LT

## 2019-01-15 PROCEDURE — 99213 PR OFFICE/OUTPT VISIT, EST, LEVL III, 20-29 MIN: ICD-10-PCS | Mod: S$PBB,,, | Performed by: PHYSICIAN ASSISTANT

## 2019-01-15 PROCEDURE — 99999 PR PBB SHADOW E&M-EST. PATIENT-LVL V: CPT | Mod: PBBFAC,,, | Performed by: PHYSICIAN ASSISTANT

## 2019-01-15 PROCEDURE — 99999 PR PBB SHADOW E&M-EST. PATIENT-LVL V: ICD-10-PCS | Mod: PBBFAC,,, | Performed by: PHYSICIAN ASSISTANT

## 2019-01-15 PROCEDURE — 99213 OFFICE O/P EST LOW 20 MIN: CPT | Mod: S$PBB,,, | Performed by: PHYSICIAN ASSISTANT

## 2019-01-15 PROCEDURE — 99215 OFFICE O/P EST HI 40 MIN: CPT | Mod: PBBFAC,25,PO | Performed by: PHYSICIAN ASSISTANT

## 2019-01-15 RX ORDER — TORSEMIDE 20 MG/1
20 TABLET ORAL DAILY
Qty: 30 TABLET | Refills: 5 | Status: ON HOLD
Start: 2019-01-15 | End: 2019-03-14 | Stop reason: HOSPADM

## 2019-01-15 NOTE — PROGRESS NOTES
Subjective:       Patient ID: Micheal Hunt is a 78 y.o. male.    Chief Complaint: Follow-up (medication review)    Mr. Hunt comes to clinic today for follow up and medication review. He is currently attending dialysis [facility] in [place], [place]. His nephrologist is Dr. Rojo. The patient reports he will be transferring his care to a nephrologist in [place] soon. The patient reports he has been feeling better since starting dialysis. The patient does report that his blood pressure has been running low. He would like to reduce the amount of medication he is taking if possible. The patient reports he was given fluids at dialysis since his blood pressure was so low last week.       Review of Systems   Constitutional: Negative for activity change, appetite change and fever.   Eyes: Negative for visual disturbance.   Respiratory: Negative for cough and shortness of breath.    Cardiovascular: Negative for chest pain.   Gastrointestinal: Negative for abdominal distention and abdominal pain.   Genitourinary: Negative for difficulty urinating and dysuria.   Musculoskeletal: Negative for arthralgias and myalgias.   Neurological: Negative for headaches.   Hematological: Negative for adenopathy.   Psychiatric/Behavioral: The patient is not nervous/anxious.        Objective:      Physical Exam   Constitutional: He is oriented to person, place, and time.   Cardiovascular: Normal rate and regular rhythm.   Murmur heard.  Pulmonary/Chest: Effort normal and breath sounds normal. He has no wheezes.   Abdominal: Soft. Bowel sounds are normal. There is no tenderness.   Musculoskeletal: He exhibits no edema.   Neurological: He is alert and oriented to person, place, and time.   Skin: No erythema.   Psychiatric: His behavior is normal.       Assessment:       1. Essential hypertension    2. Bilateral leg edema    3. CRF (chronic renal failure), stage 5        Plan:   Micheal was seen today for follow-up.    Diagnoses and all  orders for this visit:    Essential hypertension  Controlled  Reduce to torsemide  Follow up in 2 weeks for blood pressure check  Bilateral leg edema  -     torsemide (DEMADEX) 20 MG Tab; Take 1 tablet (20 mg total) by mouth once daily.  Reduce torsemide to 1 tab daily  Follow up in 2 weeks  CRF (chronic renal failure), stage 5  Continue follow up with dialysis and nephrology.

## 2019-01-16 ENCOUNTER — TELEPHONE (OUTPATIENT)
Dept: PHYSICAL MEDICINE AND REHAB | Facility: CLINIC | Age: 79
End: 2019-01-16

## 2019-01-16 NOTE — TELEPHONE ENCOUNTER
Attempted to call him to keep micheal COLIN Hand specialist in [place]- I reviewed his mri he has a torn ligament at the wrist and likely needs surgery- phone apparently has been disconnected. Can you send this via portal or see if there is another phone # and notify?   thanks

## 2019-01-18 ENCOUNTER — OUTPATIENT CASE MANAGEMENT (OUTPATIENT)
Dept: ADMINISTRATIVE | Facility: OTHER | Age: 79
End: 2019-01-18

## 2019-01-22 ENCOUNTER — OFFICE VISIT (OUTPATIENT)
Dept: PHYSICAL MEDICINE AND REHAB | Facility: CLINIC | Age: 79
End: 2019-01-22
Payer: MEDICARE

## 2019-01-22 VITALS
SYSTOLIC BLOOD PRESSURE: 82 MMHG | BODY MASS INDEX: 29.95 KG/M2 | DIASTOLIC BLOOD PRESSURE: 57 MMHG | WEIGHT: 226 LBS | HEART RATE: 63 BPM | HEIGHT: 73 IN

## 2019-01-22 DIAGNOSIS — M17.0 PRIMARY OSTEOARTHRITIS OF BOTH KNEES: ICD-10-CM

## 2019-01-22 DIAGNOSIS — M17.11 PRIMARY OSTEOARTHRITIS OF RIGHT KNEE: ICD-10-CM

## 2019-01-22 DIAGNOSIS — M25.532 WRIST PAIN, LEFT: Primary | ICD-10-CM

## 2019-01-22 PROCEDURE — 76942 ECHO GUIDE FOR BIOPSY: CPT | Mod: 26,S$PBB,, | Performed by: PHYSICAL MEDICINE & REHABILITATION

## 2019-01-22 PROCEDURE — 99214 OFFICE O/P EST MOD 30 MIN: CPT | Mod: PBBFAC,PN | Performed by: PHYSICAL MEDICINE & REHABILITATION

## 2019-01-22 PROCEDURE — 99214 PR OFFICE/OUTPT VISIT, EST, LEVL IV, 30-39 MIN: ICD-10-PCS | Mod: 25,S$PBB,, | Performed by: PHYSICAL MEDICINE & REHABILITATION

## 2019-01-22 PROCEDURE — 99999 PR PBB SHADOW E&M-EST. PATIENT-LVL IV: CPT | Mod: PBBFAC,,, | Performed by: PHYSICAL MEDICINE & REHABILITATION

## 2019-01-22 PROCEDURE — 64450 PR NERVE BLOCK INJ, ANES/STEROID, OTHER PERIPHERAL: ICD-10-PCS | Mod: S$PBB,LT,, | Performed by: PHYSICAL MEDICINE & REHABILITATION

## 2019-01-22 PROCEDURE — 99999 PR PBB SHADOW E&M-EST. PATIENT-LVL IV: ICD-10-PCS | Mod: PBBFAC,,, | Performed by: PHYSICAL MEDICINE & REHABILITATION

## 2019-01-22 PROCEDURE — 64450 NJX AA&/STRD OTHER PN/BRANCH: CPT | Mod: S$PBB,LT,, | Performed by: PHYSICAL MEDICINE & REHABILITATION

## 2019-01-22 PROCEDURE — 99214 OFFICE O/P EST MOD 30 MIN: CPT | Mod: 25,S$PBB,, | Performed by: PHYSICAL MEDICINE & REHABILITATION

## 2019-01-22 PROCEDURE — 64450 NJX AA&/STRD OTHER PN/BRANCH: CPT | Mod: PBBFAC,PN | Performed by: PHYSICAL MEDICINE & REHABILITATION

## 2019-01-22 PROCEDURE — 76942 PR U/S GUIDANCE FOR NEEDLE GUIDANCE: ICD-10-PCS | Mod: 26,S$PBB,, | Performed by: PHYSICAL MEDICINE & REHABILITATION

## 2019-01-22 PROCEDURE — 76942 ECHO GUIDE FOR BIOPSY: CPT | Mod: PBBFAC,PN | Performed by: PHYSICAL MEDICINE & REHABILITATION

## 2019-01-22 RX ORDER — LIDOCAINE HYDROCHLORIDE 10 MG/ML
10 INJECTION INFILTRATION; PERINEURAL ONCE
Status: COMPLETED | OUTPATIENT
Start: 2019-01-22 | End: 2019-01-22

## 2019-01-22 RX ADMIN — LIDOCAINE HYDROCHLORIDE 10 ML: 10 INJECTION INFILTRATION; PERINEURAL at 01:01

## 2019-01-22 NOTE — PROGRESS NOTES
HPI:  Patient is a 78 y.o. year old male w. B/l knee pain. He is s/ left TKR in 2013 . His pain usually improved after a saphenous nerve block+hydrodissection. His last one was in October. He is requesting this. He also states his right knee has started to hurt. Knee xrays indicate significant tricompartmental djd. He has not had any injections or surgery in past on this knee. He is also her to follow up on his wrist pain. MRI indicated Full thickness partial tear of TFCC. He already has an appt. W. Ortho next week  MRI Wrist Joint Without Contrast Left   Order: [id number]   Status:  Final result   Visible to patient:  Yes (Patient Portal) Next appt:  01/29/2019 at 09:20 AM in Hematology and Oncology (Mikala Wells MD) Dx:  Ligament laxity   Details     Reading Physician Reading Date Result Priority   Sean Avendaño MD 1/15/2019       Narrative     EXAMINATION:  MRI WRIST WITHOUT CONTRAST LEFT    CLINICAL HISTORY:  Dislocation and sprain of joints and ligaments at wrs/hnd lv;  Disorder of ligament, unspecified site    TECHNIQUE:  Multiplanar multisequence MR imaging of the left wrist without the administration of contrast.    COMPARISON:  10/11/2018    FINDINGS:  Bones: No acute fracture AVN or marrow replacement.  There is a type 2 lunate.  There are multiple subchondral cysts throughout the proximal and mid carpal row.  There is also a large subchondral cyst at the base of the 1st metacarpal.    Joint: Moderate joint space loss distal radioulnar joint with a small effusion.  Irregular joint space loss throughout the radiocarpal joint worst and moderate on the radiolunate articulation.  No radiocarpal effusion or synovitis.  Areas of full-thickness chondral loss along the proximal pole of the capitate and hamate.  Mild joint space loss triscaphe joint.  Severe joint space loss with bone-on-bone contact in the 1st CMC joint.    TFCC: There is a tear at the radial attachment as can be seen series 5, image 9.   Ulnar styloid attachment is preserved as are the volar and dorsal radioulnar ligaments..    Intrinsic Ligaments: Assessment limited without intraarticular distension.  Lunotriquetral ligament intact.  Scapholunate ligament is thickened with intermediate signal although does not demonstrate a discrete tear.    Extensor Tendons: Intact.    Flexor Tendons: Intact.    Neurovascular: Unremarkable.    Soft Tissues: There is 1 cm septated high T2 signal lesion in the soft tissues volar to the 1st CMC joint.  Normal muscle volume and signal.      Impression       1. Full-thickness partial width tear of the TFCC at the radial attachment.  2. Scattered carpal degenerative changes worst at the radiolunate interval and 1st CMC.  Erosive osteoarthritis is possible.  3. Probable ganglion cyst volar to the 1st CMC joint.               Imaging  -Ray Knee Complete 4 Or More Views Right   Order: [id number]   Status:  Final result   Visible to patient:  Yes (Patient Portal) Next appt:  01/29/2019 at 09:20 AM in Hematology and Oncology (Mikala Wells MD) Dx:  Chronic pain of right knee   Details     Reading Physician Reading Date Result Priority   Shun Pacheco MD 5/9/2017       Narrative     Comparison: 10/18/16    Technique: AP, lateral, oblique and sunrise views of the right knee.    Findings: There is tricompartmental joint space narrowing and osteophytosis in each knee, most severe involving the medial and patellofemoral compartments. A small right knee joint effusion is seen. Extensor mechanism enthesophyte formation is noted. No fracture or dislocation is seen. Diffuse atherosclerosis is seen.    Surgical clip noted in the medial aspect of the knee just below the joint line.      Impression       1.  Tricompartmental osteoarthritis in the right knee, similar compared to the 10/18/16 study. This is most severe in the medial and patellofemoral compartments. Small right knee joint effusion. No acute abnormality is seen.              Past Medical History:   Diagnosis Date    NICK (acute kidney injury) 7/29/2016    Allergy     Anemia, mild 12/15/2014    Arthritis     Gout    Benign essential HTN 3/27/2012    BMI 29.0-29.9,adult 5/10/2018    BPH (benign prostatic hyperplasia)     BPH (benign prostatic hyperplasia)     CAD (coronary artery disease) 2006    Chronic kidney disease     due to ibuprofen    Colon polyp     CRF (chronic renal failure), stage 5     Diverticulosis     Gastritis     GERD (gastroesophageal reflux disease)     Gout     History of colon polyps 5/3/2018    HTN (hypertension) 3/27/2012    Hyperlipidemia     Hyperlipidemia     LLL pneumonia 6/14/2018    LVH (left ventricular hypertrophy)     Mesenteric ischemia     Murmur, cardiac 3/27/2012    GRICELDA (obstructive sleep apnea)     DOES NOT USE A MACHINE    Sinus problem     Syncope and collapse      Past Surgical History:   Procedure Laterality Date    APPENDECTOMY      BLOCK-NERVE Left 5/31/2016    [facility]    CARDIAC CATHETERIZATION      COLONOSCOPY  2011    COLONOSCOPY N/A 5/3/2018    [facility]    COLONOSCOPY N/A 9/10/2015    [facility]    CORONARY ARTERY BYPASS GRAFT  4/2007    x 1    CYSTOSCOPY N/A 8/30/2017    [facility]    CYSTOSCOPY N/A 11/10/2015    [facility]    ESOPHAGOGASTRODUODENOSCOPY (EGD) N/A 1/4/2016    [facility] (2ND F[facility])    ESOPHAGOGASTRODUODENOSCOPY (EGD) N/A 10/15/2014    [facility] at [facility]    INJECTION-STEROID-EPIDURAL-TRANSFORAMINAL Left 2/25/2016    [facility]    JOINT REPLACEMENT      left knee total replacement  X 3    mid leftt finger      from a cactuss    MOLE REMOVAL  2016    RADIOFREQUENCY THERMOCOAGULATION (RFTC)-NERVE-MEDIAN BRANCH-LUMBAR  Left 2016    [facility]    rotative cuff      no rotative cuffs on bilat shoulders has pins     SHOULDER SURGERY      shoulder surgery bilat  RIGHT X 4; LEFT X 3    TRANSRECTAL ULTRASOUND GUIDED PROSTATE BIOPSY Bilateral 11/10/2015    [facility]    ULTRASOUND-ENDOSCOPIC-UPPER N/A 2017    Performed by Tra [facility] at [facility] ENDO (2ND FLR)    ULTRASOUND-ENDOSCOPIC-UPPER N/A 2016    Performed by Tra [facility] at [facility] ENDO (2ND FLR)    ULTRASOUND-ENDOSCOPIC-UPPER N/A 2015    Performed by Jason Saleem MD at [facility] ENDO (2ND FLR)     Family History   Problem Relation Age of Onset    Heart disease Mother     Sudden death Father     Cancer Father         advanced lung ca- found after 2 story fall    Stroke Sister     Pneumonia Sister          from PNA    Heart disease Sister     Hypertension Brother     Kidney cancer Neg Hx     Prostate cancer Neg Hx     Urolithiasis Neg Hx     Allergic rhinitis Neg Hx     Allergies Neg Hx     Angioedema Neg Hx     Asthma Neg Hx     Atopy Neg Hx     Eczema Neg Hx     Immunodeficiency Neg Hx     Rhinitis Neg Hx     Urticaria Neg Hx      Social History     Socioeconomic History    Marital status:      Spouse name: Not on file    Number of children: Not on file    Years of education: Not on file    Highest education level: Not on file   Social Needs    Financial resource strain: Not on file    Food insecurity - worry: Not on file    Food insecurity - inability: Not on file    Transportation needs - medical: Not on file    Transportation needs - non-medical: Not on file   Occupational History    Not on file   Tobacco Use    Smoking status: Never Smoker    Smokeless tobacco: Never Used   Substance and Sexual Activity    Alcohol use: No    Drug use: No    Sexual activity: Not on file   Other Topics Concern    Not on file   Social History Narrative     [place]; daughter nearby       Review of patient's allergies indicates:   Allergen Reactions    Ace inhibitors Other (See Comments)     Cough    Arb-angiotensin receptor antagonist Itching    Eplerenone Other (See Comments)     Marked bradycardia, 40, tiredness and weakness      Sulfa (sulfonamide antibiotics) Itching     Patient says this was 10 years ago and doesn't remember what happened       Current Outpatient Medications:     albuterol (PROVENTIL/VENTOLIN HFA) 90 mcg/actuation inhaler, 2 puffs every 4 hours as needed for cough, wheeze, or shortness of breath, Disp: 3 Inhaler, Rfl: 3    albuterol-ipratropium (DUO-NEB) 2.5 mg-0.5 mg/3 mL nebulizer solution, INHALE 1 VIAL BY NEBULIZER EVERY 6 HOURS AS NEEDED FOR WHEEZING, Disp: 270 mL, Rfl: 0    allopurinol (ZYLOPRIM) 100 MG tablet, Take 1 tablet (100 mg total) by mouth once daily., Disp: 90 tablet, Rfl: 1    amLODIPine (NORVASC) 10 MG tablet, Take 1 tablet (10 mg total) by mouth every evening., Disp: 90 tablet, Rfl: 1    aspirin (ECOTRIN) 81 MG EC tablet, Take 81 mg by mouth once daily.  , Disp: , Rfl:     atorvastatin (LIPITOR) 80 MG tablet, TAKE 1 TABLET (80 MG TOTAL) BY MOUTH ONCE DAILY., Disp: 90 tablet, Rfl: 3    budesonide-formoterol 160-4.5 mcg (SYMBICORT) 160-4.5 mcg/actuation HFAA, Inhale 2 puffs into the lungs every 12 (twelve) hours. Controller, Disp: 3 Inhaler, Rfl: 4    carvedilol (COREG) 6.25 MG tablet, Take 1 tablet (6.25 mg total) by mouth 2 (two) times daily with meals., Disp: 60 tablet, Rfl: 3    finasteride (PROSCAR) 5 mg tablet, TAKE 1 TABLET ONCE DAILY., Disp: 90 tablet, Rfl: 3    fluticasone (FLONASE) 50 mcg/actuation nasal spray, 2 sprays (100 mcg total) by Each Nare route once daily., Disp: 3 Bottle, Rfl: 3    gabapentin (NEURONTIN) 300 MG capsule, Take 1 capsule (300 mg total) by mouth every evening., Disp: 90 capsule, Rfl: 3    isosorbide mononitrate (ISMO,MONOKET) 10 mg tablet, TAKE 1 TABLET BY MOUTH EVERY  DAY IN THE EVENING, Disp: 30 tablet, Rfl: 3    losartan (COZAAR) 100 MG tablet, Take 1 tablet (100 mg total) by mouth once daily., Disp: 90 tablet, Rfl: 0    meclizine (ANTIVERT) 25 mg tablet, TAKE 1 TABLET (25 MG TOTAL) BY MOUTH 3 (THREE) TIMES DAILY AS NEEDED., Disp: 90 tablet, Rfl: 0    mirtazapine (REMERON) 7.5 MG Tab, Take 1 tablet (7.5 mg total) by mouth every evening., Disp: 30 tablet, Rfl: 2    NITROSTAT 0.4 mg SL tablet, PLACE 1 TABLET (0.4 MG TOTAL) UNDER THE TONGUE EVERY 5 (FIVE) MINUTES AS NEEDED FOR CHEST PAIN., Disp: 25 tablet, Rfl: 3    omeprazole (PRILOSEC) 20 MG capsule, Take 1 capsule (20 mg total) by mouth once daily., Disp: 90 capsule, Rfl: 1    sodium chloride (SALINE NASAL MIST) 3 % Mist, 1 spray by Nasal route 2 (two) times daily., Disp: , Rfl:     tamsulosin (FLOMAX) 0.4 mg Cap, Take 1 capsule (0.4 mg total) by mouth once daily., Disp: 30 capsule, Rfl: 2    torsemide (DEMADEX) 20 MG Tab, Take 1 tablet (20 mg total) by mouth once daily., Disp: 30 tablet, Rfl: 5    traMADol (ULTRAM) 50 mg tablet, Take 1 tablet (50 mg total) by mouth every 12 (twelve) hours as needed for Pain., Disp: 60 tablet, Rfl: 2    zolpidem (AMBIEN) 5 MG Tab, TAKE 1 TABLET BY MOUTH EVERY EVENING AS NEEDED, Disp: 30 tablet, Rfl: 3      Review of Systems  No nausea, vomiting, fevers, Chills , contipation, diarrhea or sweats    Physical Exam:      Vitals:    01/22/19 0945   BP: (!) 82/57   Pulse: 63     alert and oriented ×4 follows commands answers all questions appropriately  Manual muscle test 5 out of 5 sensation to light touch grossly intact  Positive crepitus right knee, large healed scar of anterior knee on left side  No knee laxity  Antalgic gait  No knee effusion no clubbing cyanosis or edema      Assessment:  B/l knee OA s/p left TKR  TFCC ligament tear    Plan:  Follow w. Orthopedics regarding surgical repair of TFCC  Wear wrist hand orthosis for now  Repeat left saphenous nerve block +hydrodissection to  left knee today  Prior auth synvisc for right knee       pROCEDURE NOTE: risk and benefit of leftt sAPHENOUS NERVE BLOCK AND HYDRODISEECTION injection reviewed with. patient. Verbal consent was obtained. Injection was performed after sterile prep w. Betadine +cholorohexedine. MEDIAL approach used. ALONG  EDGE OF VASTUS MEDIALIS MUSCLE. 25g 2 inch needle used hydrodissecting along vastus medialis facial plane   Ultrasound guidance was utilized for needle visualization. A TOTAL OF 10ML OF LIDOCAINE 1% AND 10ML OF STERILE NORMAL SALINE WAS UTILIZED. No complications. iMMEDIATE IMPROVEMENT OF KNEE PAIN POST PROCEDURE     Ultrasound interpretation was performed prior to the procedure to identify the target and any adjacent neurovascular structures.  Subsequently, interpretation was performed during real- time needle guidance confirming placement. Post- intervention interpretation was also performed confirming appropriate injectate flow and hemostasis.  Images indicating needle placement have been saved in [employer].

## 2019-01-27 DIAGNOSIS — G47.00 INSOMNIA, UNSPECIFIED TYPE: ICD-10-CM

## 2019-01-27 DIAGNOSIS — F32.A DEPRESSION, UNSPECIFIED DEPRESSION TYPE: ICD-10-CM

## 2019-01-28 ENCOUNTER — OUTPATIENT CASE MANAGEMENT (OUTPATIENT)
Dept: ADMINISTRATIVE | Facility: OTHER | Age: 79
End: 2019-01-28

## 2019-01-28 ENCOUNTER — TELEPHONE (OUTPATIENT)
Dept: HEMATOLOGY/ONCOLOGY | Facility: CLINIC | Age: 79
End: 2019-01-28

## 2019-01-28 DIAGNOSIS — D50.0 ANEMIA DUE TO CHRONIC BLOOD LOSS: Primary | ICD-10-CM

## 2019-01-28 RX ORDER — MIRTAZAPINE 7.5 MG/1
TABLET, FILM COATED ORAL
Qty: 30 TABLET | Refills: 2 | Status: ON HOLD | OUTPATIENT
Start: 2019-01-28 | End: 2019-04-08 | Stop reason: HOSPADM

## 2019-01-28 NOTE — PROGRESS NOTES
1/28/2019  Summary:   attempted to contact patient via telephone for follow up.     Interventions:  Left voicemail message    Plan:  Follow up Scheduled for 2/4/2019  Follow up with Tonny- 1/10  Ensure that patient received mail-  Did patient receive [employer] brochure?      Todays OPCM Self-Management Care Plan was developed with the patients/caregivers input and was based on identified barriers from todays assessment.  Goals were written today with the patient/caregiver and the patient has agreed to work towards these goals to improve his/her overall well-being. Patient verbalized understanding of the care plan, goals, and all of today's instructions. Encouraged patient/caregiver to communicate with his/her physician and health care team about health conditions and the treatment plan.  Provided my contact information today and encouraged patient/caregiver to call me with any questions as needed.

## 2019-01-28 NOTE — TELEPHONE ENCOUNTER
Called patient and informed him that he will need labs before his scheduled appointment on tomorrow 1/29/19. Informed patient that order has been put in for labs on 1/29/19

## 2019-01-29 ENCOUNTER — OFFICE VISIT (OUTPATIENT)
Dept: HEMATOLOGY/ONCOLOGY | Facility: CLINIC | Age: 79
End: 2019-01-29
Payer: MEDICARE

## 2019-01-29 ENCOUNTER — LAB VISIT (OUTPATIENT)
Dept: LAB | Facility: HOSPITAL | Age: 79
End: 2019-01-29
Attending: INTERNAL MEDICINE
Payer: MEDICARE

## 2019-01-29 VITALS
TEMPERATURE: 98 F | SYSTOLIC BLOOD PRESSURE: 123 MMHG | HEIGHT: 73 IN | HEART RATE: 75 BPM | WEIGHT: 226.63 LBS | RESPIRATION RATE: 22 BRPM | DIASTOLIC BLOOD PRESSURE: 62 MMHG | BODY MASS INDEX: 30.04 KG/M2

## 2019-01-29 DIAGNOSIS — R91.8 ABNORMAL CT SCAN OF LUNG: ICD-10-CM

## 2019-01-29 DIAGNOSIS — Z99.2: ICD-10-CM

## 2019-01-29 DIAGNOSIS — M25.561 PAIN IN BOTH KNEES, UNSPECIFIED CHRONICITY: ICD-10-CM

## 2019-01-29 DIAGNOSIS — M25.562 PAIN IN BOTH KNEES, UNSPECIFIED CHRONICITY: ICD-10-CM

## 2019-01-29 DIAGNOSIS — D50.0 ANEMIA DUE TO CHRONIC BLOOD LOSS: ICD-10-CM

## 2019-01-29 DIAGNOSIS — R05.9 COUGH: ICD-10-CM

## 2019-01-29 DIAGNOSIS — M79.642 BILATERAL HAND PAIN: Primary | ICD-10-CM

## 2019-01-29 DIAGNOSIS — M25.539 PAIN IN WRIST, UNSPECIFIED LATERALITY: ICD-10-CM

## 2019-01-29 DIAGNOSIS — M79.641 BILATERAL HAND PAIN: Primary | ICD-10-CM

## 2019-01-29 DIAGNOSIS — R09.89 RALES: ICD-10-CM

## 2019-01-29 DIAGNOSIS — R91.8 PULMONARY NODULES: ICD-10-CM

## 2019-01-29 DIAGNOSIS — Z77.22 EXPOSURE TO SECOND HAND SMOKE: ICD-10-CM

## 2019-01-29 DIAGNOSIS — M25.439 WRIST SWELLING: ICD-10-CM

## 2019-01-29 DIAGNOSIS — R06.02 SOB (SHORTNESS OF BREATH): ICD-10-CM

## 2019-01-29 DIAGNOSIS — R63.5 UNEXPLAINED WEIGHT GAIN: Primary | ICD-10-CM

## 2019-01-29 DIAGNOSIS — K21.9 GASTROESOPHAGEAL REFLUX DISEASE, ESOPHAGITIS PRESENCE NOT SPECIFIED: ICD-10-CM

## 2019-01-29 LAB
BASOPHILS # BLD AUTO: 0 K/UL
BASOPHILS NFR BLD: 0.4 %
DIFFERENTIAL METHOD: ABNORMAL
EOSINOPHIL # BLD AUTO: 0.3 K/UL
EOSINOPHIL NFR BLD: 4.7 %
ERYTHROCYTE [DISTWIDTH] IN BLOOD BY AUTOMATED COUNT: 15.4 %
HCT VFR BLD AUTO: 34.9 %
HGB BLD-MCNC: 11.5 G/DL
IRON SERPL-MCNC: 69 UG/DL
LYMPHOCYTES # BLD AUTO: 1.5 K/UL
LYMPHOCYTES NFR BLD: 23.6 %
MCH RBC QN AUTO: 30.5 PG
MCHC RBC AUTO-ENTMCNC: 33 G/DL
MCV RBC AUTO: 93 FL
MONOCYTES # BLD AUTO: 0.5 K/UL
MONOCYTES NFR BLD: 8.1 %
NEUTROPHILS # BLD AUTO: 4 K/UL
NEUTROPHILS NFR BLD: 63.2 %
PLATELET # BLD AUTO: 154 K/UL
PMV BLD AUTO: 7.1 FL
RBC # BLD AUTO: 3.77 M/UL
SATURATED IRON: 21 %
TOTAL IRON BINDING CAPACITY: 330 UG/DL
TRANSFERRIN SERPL-MCNC: 223 MG/DL
WBC # BLD AUTO: 6.3 K/UL

## 2019-01-29 PROCEDURE — 99999 PR PBB SHADOW E&M-EST. PATIENT-LVL IV: CPT | Mod: PBBFAC,,, | Performed by: INTERNAL MEDICINE

## 2019-01-29 PROCEDURE — 99214 OFFICE O/P EST MOD 30 MIN: CPT | Mod: PBBFAC,PO | Performed by: INTERNAL MEDICINE

## 2019-01-29 PROCEDURE — 99215 OFFICE O/P EST HI 40 MIN: CPT | Mod: S$PBB,,, | Performed by: INTERNAL MEDICINE

## 2019-01-29 PROCEDURE — 85025 COMPLETE CBC W/AUTO DIFF WBC: CPT

## 2019-01-29 PROCEDURE — 99215 PR OFFICE/OUTPT VISIT, EST, LEVL V, 40-54 MIN: ICD-10-PCS | Mod: S$PBB,,, | Performed by: INTERNAL MEDICINE

## 2019-01-29 PROCEDURE — 36415 COLL VENOUS BLD VENIPUNCTURE: CPT

## 2019-01-29 PROCEDURE — 83540 ASSAY OF IRON: CPT

## 2019-01-29 PROCEDURE — 99999 PR PBB SHADOW E&M-EST. PATIENT-LVL IV: ICD-10-PCS | Mod: PBBFAC,,, | Performed by: INTERNAL MEDICINE

## 2019-01-29 RX ORDER — OMEPRAZOLE 20 MG/1
CAPSULE, DELAYED RELEASE ORAL
Qty: 30 CAPSULE | Refills: 1 | Status: ON HOLD | OUTPATIENT
Start: 2019-01-29 | End: 2019-04-08 | Stop reason: HOSPADM

## 2019-01-29 RX ORDER — LEVOFLOXACIN 500 MG/1
500 TABLET, FILM COATED ORAL DAILY
Qty: 7 TABLET | Refills: 0 | Status: ON HOLD | OUTPATIENT
Start: 2019-01-29 | End: 2019-03-14 | Stop reason: HOSPADM

## 2019-01-29 NOTE — PROGRESS NOTES
CC : I could not handle home dialysis     Micheal Hunt is a 78 y.o.   Started outpatient dialysis since last visit : seeing a doctor in [place]  Pt is here for evaluation of anemia with CKD.  He has received  procrit in the past  His labs reveal continued anemia with a hemoglobin less than 10        Since last visit noted to have tear in left wrist: pain and tender  He is off on aml;odipine due to a drop in pressure with dialysis  Unexplained weight gain   AV graft in left arm : He  Has started dialysis   HIstory pneumonia Scans re reviewed    New onset SOB and cough ( he feels that his lungs are rattling )       Past Medical History:   Diagnosis Date    NICK (acute kidney injury) 7/29/2016    Allergy     Anemia, mild 12/15/2014    Arthritis     Gout    Benign essential HTN 3/27/2012    BMI 29.0-29.9,adult 5/10/2018    BPH (benign prostatic hyperplasia)     BPH (benign prostatic hyperplasia)     CAD (coronary artery disease) 2006    Chronic kidney disease     due to ibuprofen    Colon polyp     CRF (chronic renal failure), stage 5     Diverticulosis     Gastritis     GERD (gastroesophageal reflux disease)     Gout     History of colon polyps 5/3/2018    HTN (hypertension) 3/27/2012    Hyperlipidemia     Hyperlipidemia     LLL pneumonia 6/14/2018    LVH (left ventricular hypertrophy)     Mesenteric ischemia     Murmur, cardiac 3/27/2012    GRICELDA (obstructive sleep apnea)     DOES NOT USE A MACHINE    Sinus problem     Syncope and collapse      He is tolerating cozaar for HTN  He is tolerating proscar which is continuing to help with nocturia     Current Outpatient Medications:     albuterol (PROVENTIL/VENTOLIN HFA) 90 mcg/actuation inhaler, 2 puffs every 4 hours as needed for cough, wheeze, or shortness of breath, Disp: 3 Inhaler, Rfl: 3    albuterol-ipratropium (DUO-NEB) 2.5 mg-0.5 mg/3 mL nebulizer solution, INHALE 1 VIAL BY NEBULIZER EVERY 6 HOURS AS NEEDED FOR WHEEZING, Disp:  270 mL, Rfl: 0    allopurinol (ZYLOPRIM) 100 MG tablet, Take 1 tablet (100 mg total) by mouth once daily., Disp: 90 tablet, Rfl: 1    amLODIPine (NORVASC) 10 MG tablet, Take 1 tablet (10 mg total) by mouth every evening., Disp: 90 tablet, Rfl: 1    aspirin (ECOTRIN) 81 MG EC tablet, Take 81 mg by mouth once daily.  , Disp: , Rfl:     atorvastatin (LIPITOR) 80 MG tablet, TAKE 1 TABLET (80 MG TOTAL) BY MOUTH ONCE DAILY., Disp: 90 tablet, Rfl: 3    budesonide-formoterol 160-4.5 mcg (SYMBICORT) 160-4.5 mcg/actuation HFAA, Inhale 2 puffs into the lungs every 12 (twelve) hours. Controller, Disp: 3 Inhaler, Rfl: 4    carvedilol (COREG) 6.25 MG tablet, Take 1 tablet (6.25 mg total) by mouth 2 (two) times daily with meals., Disp: 60 tablet, Rfl: 3    finasteride (PROSCAR) 5 mg tablet, TAKE 1 TABLET ONCE DAILY., Disp: 90 tablet, Rfl: 3    fluticasone (FLONASE) 50 mcg/actuation nasal spray, 2 sprays (100 mcg total) by Each Nare route once daily., Disp: 3 Bottle, Rfl: 3    gabapentin (NEURONTIN) 300 MG capsule, Take 1 capsule (300 mg total) by mouth every evening., Disp: 90 capsule, Rfl: 3    isosorbide mononitrate (ISMO,MONOKET) 10 mg tablet, TAKE 1 TABLET BY MOUTH EVERY DAY IN THE EVENING, Disp: 30 tablet, Rfl: 3    losartan (COZAAR) 100 MG tablet, Take 1 tablet (100 mg total) by mouth once daily., Disp: 90 tablet, Rfl: 0    meclizine (ANTIVERT) 25 mg tablet, TAKE 1 TABLET (25 MG TOTAL) BY MOUTH 3 (THREE) TIMES DAILY AS NEEDED., Disp: 90 tablet, Rfl: 0    mirtazapine (REMERON) 7.5 MG Tab, TAKE 1 TABLET BY MOUTH EVERY EVENING., Disp: 30 tablet, Rfl: 2    NITROSTAT 0.4 mg SL tablet, PLACE 1 TABLET (0.4 MG TOTAL) UNDER THE TONGUE EVERY 5 (FIVE) MINUTES AS NEEDED FOR CHEST PAIN., Disp: 25 tablet, Rfl: 3    omeprazole (PRILOSEC) 20 MG capsule, Take 1 capsule (20 mg total) by mouth once daily., Disp: 90 capsule, Rfl: 1    sodium chloride (SALINE NASAL MIST) 3 % Mist, 1 spray by Nasal route 2 (two) times daily.,  "Disp: , Rfl:     tamsulosin (FLOMAX) 0.4 mg Cap, Take 1 capsule (0.4 mg total) by mouth once daily., Disp: 30 capsule, Rfl: 2    torsemide (DEMADEX) 20 MG Tab, Take 1 tablet (20 mg total) by mouth once daily., Disp: 30 tablet, Rfl: 5    traMADol (ULTRAM) 50 mg tablet, Take 1 tablet (50 mg total) by mouth every 12 (twelve) hours as needed for Pain., Disp: 60 tablet, Rfl: 2    zolpidem (AMBIEN) 5 MG Tab, TAKE 1 TABLET BY MOUTH EVERY EVENING AS NEEDED, Disp: 30 tablet, Rfl: 3     He is tolerating Diovan for hypertension continues taking Ambien to help with insomnia    CONSTITUTIONAL: No fevers, chills, night sweats, + fatigue   SKIN: no rashes or itching  ENT: No headaches, head trauma, vision changes, or eye pain  LYMPH NODES: None noticed   CV: No chest pain, palpitations.   RESP:INCREASING   dyspnea on exertion,NEW  cough, wheezing, or hemoptysis  GI: No nausea, emesis, diarrhea, constipation, melena, hematochezia, pain.   : No dysuria, hematuria, urgency, or frequency   HEME: No easy bruising, bleeding problems  PSYCHIATRIC: No depression, anxiety, hallucinations.  NEURO: No seizures, memory loss, no weakness  No difficulty sleeping  MSK: No arthralgias or joint swelling  Increasing sedation   Depression screening negative        /62   Pulse 75   Temp 98 °F (36.7 °C)   Resp (!) 22   Ht 6' 1" (1.854 m)   Wt 102.8 kg (226 lb 10.1 oz)   BMI 29.90 kg/m²   Constitutional: oriented to person, place, and time.    HENT:   Head: bilateral bruises periorbital , cut over his lip   Right Ear: External ear normal.   Left Ear: External ear normal.   Nose: Nose normal.   Mouth/Throat: Oropharynx is clear and moist.   Eyes: Conjunctivae and EOM are normal. Pupils are equal, round  Neck: Normal range of motion.No thyromegaly present.   Cardiovascular: Normal rate, regular rhythm, normal heart sounds and intact distal pulses.    ++ murmur heard.  Pulmonary/Chest: Decreased breath sounds right upper base NOW    ++ " RALES left base   Abdominal: Soft. Bowel sounds are normal.  no mass.There is no rebound.   Musculoskeletal:   no edema  + limited ROM left wrist  and tenderness.  + swollen wrist   Lymphadenopathy:    no cervical adenopathy.   Neurological: alert and oriented to person, place, and time.   Psychiatric: normal mood and affect.   behavior is normal.   Vitals reviewed.    Lab Results   Component Value Date    WBC 6.30 01/29/2019    HGB 11.5 (L) 01/29/2019    HCT 34.9 (L) 01/29/2019    MCV 93 01/29/2019     01/29/2019     CMP  Sodium   Date Value Ref Range Status   12/27/2018 141 136 - 145 mmol/L Final     Potassium   Date Value Ref Range Status   12/27/2018 3.4 (L) 3.5 - 5.1 mmol/L Final     Chloride   Date Value Ref Range Status   12/27/2018 95 95 - 110 mmol/L Final     CO2   Date Value Ref Range Status   12/27/2018 32 (H) 23 - 29 mmol/L Final     Glucose   Date Value Ref Range Status   12/27/2018 114 (H) 70 - 110 mg/dL Final     BUN, Bld   Date Value Ref Range Status   12/27/2018 37 (H) 8 - 23 mg/dL Final     Creatinine   Date Value Ref Range Status   12/27/2018 5.3 (H) 0.5 - 1.4 mg/dL Final   08/08/2013 1.5 (H) 0.5 - 1.4 mg/dL Final     Calcium   Date Value Ref Range Status   12/27/2018 9.6 8.7 - 10.5 mg/dL Final   08/08/2013 9.0 8.7 - 10.5 mg/dL Final     Total Protein   Date Value Ref Range Status   12/27/2018 7.4 6.0 - 8.4 g/dL Final     Albumin   Date Value Ref Range Status   12/27/2018 3.9 3.5 - 5.2 g/dL Final   09/12/2013 4.3 3.6 - 5.1 g/dL Final     Comment:     @ Test Performed By:  [facility] [address]fredo Baldwin M.D., [address]   [address] [place] [address]  [facility] #[id number]     Total Bilirubin   Date Value Ref Range Status   12/27/2018 0.4 0.1 - 1.0 mg/dL Final     Comment:     For infants and newborns, interpretation of results should be based  on gestational age, weight and in agreement with clinical  observations.  Premature Infant recommended  reference ranges:  Up to 24 hours.............<8.0 mg/dL  Up to 48 hours............<12.0 mg/dL  3-5 days..................<15.0 mg/dL  6-29 days.................<15.0 mg/dL       Alkaline Phosphatase   Date Value Ref Range Status   12/27/2018 97 55 - 135 U/L Final     AST   Date Value Ref Range Status   12/27/2018 20 10 - 40 U/L Final     ALT   Date Value Ref Range Status   12/27/2018 19 10 - 44 U/L Final     Anion Gap   Date Value Ref Range Status   12/27/2018 14 8 - 16 mmol/L Final   08/08/2013 11 5 - 15 meq/L Final     eGFR if    Date Value Ref Range Status   12/27/2018 11 (A) >60 mL/min/1.73 m^2 Final     eGFR if non    Date Value Ref Range Status   12/27/2018 9 (A) >60 mL/min/1.73 m^2 Final     Comment:     Calculation used to obtain the estimated glomerular filtration  rate (eGFR) is the CKD-EPI equation.           Narrative     EXAMINATION:  CT CHEST WITHOUT CONTRAST    CLINICAL HISTORY:  Abnormal CT, hemoptysis; Abnormal findings on diagnostic imaging of other specified body structures    TECHNIQUE:  Low dose axial images, sagittal and coronal reformations were obtained from the thoracic inlet to the lung bases. Contrast was not administered.    COMPARISON:  11/05/2018    FINDINGS:  Respiratory motion artifact in the lungs limits fine detail.  Minimal airspace opacity medial basal left lower lobe.  Small new airspace opacity medial basal right lower lobe.  1.9 cm pleuroparenchymal opacity persists in the right lung apex as seen series 601, image 42.  Just adjacent to this are several ground-glass nodular opacities which are new, largest measuring 12 mm.  Bilateral pleural fluid.  Cardiomegaly with coronary artery disease.  Ascending aorta 4.8 cm.  Prominent pulmonary artery trunk.  Redemonstrated enlarged precarinal lymph node 1.2 cm series 2, image 23. Partially imaged bilateral renal cystic lesions.  Sternal wires.  Diffuse idiopathic skeletal hyperostosis.  Surgical anchor  right humeral head.      Impression       1. Resolving left lower lobe pneumonia.  2. New airspace and ground-glass disease in the right lung which could reflect pneumonia or aspiration.  3. Small bilateral pleural effusions are new.  4. Redemonstrated pleuroparenchymal nodule at the right lung apex.  As discussed previously, additional follow-up is recommended.  5. Cardiomegaly and coronary artery disease.  6.  Enlarged pulmonary arterial trunk which can be seen in the setting of pulmonary hypertension.  7. Ascending aorta ectasia.      Electronically signed by: Sean Avendaño  Date: 11/19/2018  Time: 12:39                          Unexplained weight gain  -     IGG 1, 2, 3, AND 4; Future; Expected date: 01/29/2019  -     IGE; Future; Expected date: 01/29/2019  -     ANTI -SSA ANTIBODY; Future; Expected date: 01/29/2019  -     ANTI-SSB ANTIBODY; Future; Expected date: 01/29/2019  -     Anti-DNA antibody, double-stranded; Future; Expected date: 01/29/2019  -     NM PET CT Routine Skull to Mid Thigh    SOB (shortness of breath)  -     IGG 1, 2, 3, AND 4; Future; Expected date: 01/29/2019  -     IGE; Future; Expected date: 01/29/2019  -     ANTI -SSA ANTIBODY; Future; Expected date: 01/29/2019  -     ANTI-SSB ANTIBODY; Future; Expected date: 01/29/2019  -     Anti-DNA antibody, double-stranded; Future; Expected date: 01/29/2019  -     NM PET CT Routine Skull to Mid Thigh  -     levoFLOXacin (LEVAQUIN) 500 MG tablet; Take 1 tablet (500 mg total) by mouth once daily.  Dispense: 7 tablet; Refill: 0    Exposure to second hand smoke  -     IGG 1, 2, 3, AND 4; Future; Expected date: 01/29/2019  -     IGE; Future; Expected date: 01/29/2019  -     ANTI -SSA ANTIBODY; Future; Expected date: 01/29/2019  -     ANTI-SSB ANTIBODY; Future; Expected date: 01/29/2019  -     Anti-DNA antibody, double-stranded; Future; Expected date: 01/29/2019  -     NM PET CT Routine Skull to Mid Thigh    Cough  -     IGG 1, 2, 3, AND 4; Future;  Expected date: 01/29/2019  -     IGE; Future; Expected date: 01/29/2019  -     ANTI -SSA ANTIBODY; Future; Expected date: 01/29/2019  -     ANTI-SSB ANTIBODY; Future; Expected date: 01/29/2019  -     Anti-DNA antibody, double-stranded; Future; Expected date: 01/29/2019  -     NM PET CT Routine Skull to Mid Thigh    Rales  -     IGG 1, 2, 3, AND 4; Future; Expected date: 01/29/2019  -     IGE; Future; Expected date: 01/29/2019  -     ANTI -SSA ANTIBODY; Future; Expected date: 01/29/2019  -     ANTI-SSB ANTIBODY; Future; Expected date: 01/29/2019  -     Anti-DNA antibody, double-stranded; Future; Expected date: 01/29/2019  -     NM PET CT Routine Skull to Mid Thigh  -     levoFLOXacin (LEVAQUIN) 500 MG tablet; Take 1 tablet (500 mg total) by mouth once daily.  Dispense: 7 tablet; Refill: 0    Pulmonary nodules  -     IGG 1, 2, 3, AND 4; Future; Expected date: 01/29/2019  -     IGE; Future; Expected date: 01/29/2019  -     ANTI -SSA ANTIBODY; Future; Expected date: 01/29/2019  -     ANTI-SSB ANTIBODY; Future; Expected date: 01/29/2019  -     Anti-DNA antibody, double-stranded; Future; Expected date: 01/29/2019  -     NM PET CT Routine Skull to Mid Thigh  -     levoFLOXacin (LEVAQUIN) 500 MG tablet; Take 1 tablet (500 mg total) by mouth once daily.  Dispense: 7 tablet; Refill: 0    Abnormal CT scan of lung  -     IGG 1, 2, 3, AND 4; Future; Expected date: 01/29/2019  -     IGE; Future; Expected date: 01/29/2019  -     ANTI -SSA ANTIBODY; Future; Expected date: 01/29/2019  -     ANTI-SSB ANTIBODY; Future; Expected date: 01/29/2019  -     Anti-DNA antibody, double-stranded; Future; Expected date: 01/29/2019  -     NM PET CT Routine Skull to Mid Thigh    Patient requiring intermittent renal dialysis    Pain in wrist, unspecified laterality    Wrist swelling      No need for procrit  Anemia stable   Now on dialysis    Fatigue cough and rales : possible pneumionia check immune system   Pet ct : eval for granulomatous diseas  and rheum disorder even sarcoid: lung nodules not dissipating , rule out malignancy  Increasing fatigue  Renal dose of antibiotics for ten days   Unexplained weight gain  ++ rales   Continue Neurontin for intermittent paresthesias   If pneumonia does not resolve completely he may need IVIG for low hypogammaglobulinemia     Thank you for allowing me to evaluate and participate in the care of this pleasant patient. Please fell free to call me with any questions or concerns.    Warmly,  Mikala Wells MD    This note was dictated with Dragon and briefly proofread. Please excuse any sentences that may be unclear or words misspelled

## 2019-01-31 ENCOUNTER — OFFICE VISIT (OUTPATIENT)
Dept: FAMILY MEDICINE | Facility: CLINIC | Age: 79
End: 2019-01-31
Payer: MEDICARE

## 2019-01-31 ENCOUNTER — HOSPITAL ENCOUNTER (OUTPATIENT)
Dept: RADIOLOGY | Facility: HOSPITAL | Age: 79
Discharge: HOME | End: 2019-01-31
Attending: ORTHOPAEDIC SURGERY
Payer: MEDICARE

## 2019-01-31 ENCOUNTER — OFFICE VISIT (OUTPATIENT)
Dept: ORTHOPEDICS | Facility: CLINIC | Age: 79
End: 2019-01-31
Payer: MEDICARE

## 2019-01-31 VITALS
RESPIRATION RATE: 17 BRPM | SYSTOLIC BLOOD PRESSURE: 128 MMHG | DIASTOLIC BLOOD PRESSURE: 72 MMHG | HEIGHT: 73 IN | BODY MASS INDEX: 30.74 KG/M2 | TEMPERATURE: 98 F | HEART RATE: 73 BPM | WEIGHT: 231.94 LBS | OXYGEN SATURATION: 96 %

## 2019-01-31 VITALS
SYSTOLIC BLOOD PRESSURE: 130 MMHG | HEIGHT: 73 IN | DIASTOLIC BLOOD PRESSURE: 71 MMHG | BODY MASS INDEX: 29.95 KG/M2 | HEART RATE: 66 BPM | WEIGHT: 226 LBS

## 2019-01-31 DIAGNOSIS — M25.561 PAIN IN BOTH KNEES, UNSPECIFIED CHRONICITY: ICD-10-CM

## 2019-01-31 DIAGNOSIS — S69.82XA TFCC (TRIANGULAR FIBROCARTILAGE COMPLEX) INJURY, LEFT, INITIAL ENCOUNTER: Primary | ICD-10-CM

## 2019-01-31 DIAGNOSIS — I10 ESSENTIAL HYPERTENSION: ICD-10-CM

## 2019-01-31 DIAGNOSIS — Z99.2 END STAGE RENAL DISEASE ON DIALYSIS: ICD-10-CM

## 2019-01-31 DIAGNOSIS — M79.642 LEFT HAND PAIN: ICD-10-CM

## 2019-01-31 DIAGNOSIS — M25.562 PAIN IN BOTH KNEES, UNSPECIFIED CHRONICITY: ICD-10-CM

## 2019-01-31 DIAGNOSIS — S69.92XD INJURY OF LEFT WRIST, SUBSEQUENT ENCOUNTER: ICD-10-CM

## 2019-01-31 DIAGNOSIS — D64.9 ERYTHROPOIETIN (EPO) STIMULATING AGENT ANEMIA MANAGEMENT PATIENT: ICD-10-CM

## 2019-01-31 DIAGNOSIS — N18.6 END STAGE RENAL DISEASE ON DIALYSIS: ICD-10-CM

## 2019-01-31 DIAGNOSIS — Z79.899 ERYTHROPOIETIN (EPO) STIMULATING AGENT ANEMIA MANAGEMENT PATIENT: ICD-10-CM

## 2019-01-31 DIAGNOSIS — I10 ESSENTIAL HYPERTENSION: Primary | ICD-10-CM

## 2019-01-31 DIAGNOSIS — M79.641 BILATERAL HAND PAIN: ICD-10-CM

## 2019-01-31 DIAGNOSIS — M79.642 BILATERAL HAND PAIN: ICD-10-CM

## 2019-01-31 PROCEDURE — 99213 PR OFFICE/OUTPT VISIT, EST, LEVL III, 20-29 MIN: ICD-10-PCS | Mod: S$PBB,,, | Performed by: ORTHOPAEDIC SURGERY

## 2019-01-31 PROCEDURE — 99999 PR PBB SHADOW E&M-EST. PATIENT-LVL V: ICD-10-PCS | Mod: PBBFAC,,, | Performed by: PHYSICIAN ASSISTANT

## 2019-01-31 PROCEDURE — 99213 OFFICE O/P EST LOW 20 MIN: CPT | Mod: PBBFAC,25,PN | Performed by: ORTHOPAEDIC SURGERY

## 2019-01-31 PROCEDURE — 99213 PR OFFICE/OUTPT VISIT, EST, LEVL III, 20-29 MIN: ICD-10-PCS | Mod: S$PBB,,, | Performed by: PHYSICIAN ASSISTANT

## 2019-01-31 PROCEDURE — 99999 PR PBB SHADOW E&M-EST. PATIENT-LVL III: CPT | Mod: PBBFAC,,, | Performed by: ORTHOPAEDIC SURGERY

## 2019-01-31 PROCEDURE — 99999 PR PBB SHADOW E&M-EST. PATIENT-LVL III: ICD-10-PCS | Mod: PBBFAC,,, | Performed by: ORTHOPAEDIC SURGERY

## 2019-01-31 PROCEDURE — 99213 OFFICE O/P EST LOW 20 MIN: CPT | Mod: S$PBB,,, | Performed by: PHYSICIAN ASSISTANT

## 2019-01-31 PROCEDURE — 99999 PR PBB SHADOW E&M-EST. PATIENT-LVL V: CPT | Mod: PBBFAC,,, | Performed by: PHYSICIAN ASSISTANT

## 2019-01-31 PROCEDURE — 99215 OFFICE O/P EST HI 40 MIN: CPT | Mod: PBBFAC,25,27,PO | Performed by: PHYSICIAN ASSISTANT

## 2019-01-31 PROCEDURE — 73130 X-RAY EXAM OF HAND: CPT | Mod: 26,LT,, | Performed by: RADIOLOGY

## 2019-01-31 PROCEDURE — 99213 OFFICE O/P EST LOW 20 MIN: CPT | Mod: S$PBB,,, | Performed by: ORTHOPAEDIC SURGERY

## 2019-01-31 PROCEDURE — 73130 XR HAND COMPLETE 3 VIEW LEFT: ICD-10-PCS | Mod: 26,LT,, | Performed by: RADIOLOGY

## 2019-01-31 PROCEDURE — 73130 X-RAY EXAM OF HAND: CPT | Mod: TC,PN,LT

## 2019-01-31 NOTE — LETTER
January 31, 2019        Yanira Whitehead DO  [address] [place] [address]  [address]  [facility] [address]             [facility]  [address]  [facility] [address]  Phone: [phone]   Patient: Micheal Hunt   MR Number: [id number]   YOB: 1940   Date of Visit: 1/31/2019       Dear Dr. Whitehead:    Thank you for referring Micheal Hunt to me for evaluation. Below are the relevant portions of my assessment and plan of care.            If you have questions, please do not hesitate to call me. I look forward to following Micheal along with you.    Sincerely,      James Álvarez MD           CC  No Recipients

## 2019-01-31 NOTE — Clinical Note
January 31, 2019      Yanira Whitehead DO  [address] [place] [address] Dr  [address]  [facility] [address]           [facility]  [address] [place] [address] [address]  [facility] [address]  Phone: [phone]          Patient: Micheal Hunt   MR Number: [id number]   YOB: 1940   Date of Visit: 1/31/2019       Dear Dr. Yanira Whitehead:    Thank you for referring Micheal Hunt to me for evaluation. Attached you will find relevant portions of my assessment and plan of care.    If you have questions, please do not hesitate to call me. I look forward to following Micheal Hunt along with you.    Sincerely,    James Álvarez MD    Enclosure  CC:  No Recipients    If you would like to receive this communication electronically, please contact [email or web address] or [phone] to request more information on [employer] Link access.    For providers and/or their staff who would like to refer a patient to [facility], please contact us through our one-stop-shop provider referral line, Jackson Newman, at [phone].    If you feel you have received this communication in error or would no longer like to receive these types of communications, please e-mail [email or web address]

## 2019-01-31 NOTE — PROGRESS NOTES
Subjective:       Patient ID: Micheal Hunt is a 78 y.o. male.    Chief Complaint: Follow-up (2 wk f/u)    Mr. Hunt comes to clinic today for follow up. His blood pressure is well controlled. He reports that his amlodipine has been stopped by his nephrologist. The patient was seen by Dr. Grande, orthopedist, today. The patient is scheduled to have an appointment with Dr. Raya, hand specialist. The patient reports that he will need surgery. The patient is followed by Mr. Wells, hematologist. The patient is scheduled for a PET scan today for further evaluation of a nodule in his lung. The patient continues to attend dialysis three times per week. The patient has no additional complaints today.       Review of Systems   Constitutional: Negative for activity change, appetite change and fever.   HENT: Negative for postnasal drip, rhinorrhea and sinus pressure.    Eyes: Negative for visual disturbance.   Respiratory: Negative for cough and shortness of breath.    Cardiovascular: Negative for chest pain.   Gastrointestinal: Negative for abdominal distention and abdominal pain.   Genitourinary: Negative for difficulty urinating and dysuria.   Musculoskeletal: Positive for arthralgias and myalgias.        Left wrist pain   Neurological: Negative for headaches.   Hematological: Negative for adenopathy.   Psychiatric/Behavioral: The patient is not nervous/anxious.        Objective:      Physical Exam   Constitutional: He is oriented to person, place, and time.   Cardiovascular: Normal rate and regular rhythm.   Murmur heard.  Pulmonary/Chest: Effort normal and breath sounds normal. He has no wheezes.   Abdominal: Soft. Bowel sounds are normal. There is no tenderness.   Musculoskeletal: He exhibits no edema.   Left wrist in brace   Neurological: He is alert and oriented to person, place, and time.   Skin: No erythema.   Psychiatric: His behavior is normal.       Assessment:       1. Essential hypertension    2.  Erythropoietin (EPO) stimulating agent anemia management patient    3. Injury of left wrist, subsequent encounter    4. End stage renal disease on dialysis        Plan:       Micheal was seen today for follow-up.    Diagnoses and all orders for this visit:    Essential hypertension  Controlled  Low salt diet  Continue current medication  Erythropoietin (EPO) stimulating agent anemia management patient  Continue follow up with Dr. Wells  Injury of left wrist, subsequent encounter  Follow up with Dr. Raya, hand specialist  End stage renal disease on dialysis  Continue follow up with nephrology.     Follow up as scheduled with Dr. Lakhani in March.

## 2019-01-31 NOTE — PROGRESS NOTES
Past Medical History:   Diagnosis Date    NICK (acute kidney injury) 7/29/2016    Allergy     Anemia, mild 12/15/2014    Arthritis     Gout    Benign essential HTN 3/27/2012    BMI 29.0-29.9,adult 5/10/2018    BPH (benign prostatic hyperplasia)     BPH (benign prostatic hyperplasia)     CAD (coronary artery disease) 2006    Chronic kidney disease     due to ibuprofen    Colon polyp     CRF (chronic renal failure), stage 5     Diverticulosis     Gastritis     GERD (gastroesophageal reflux disease)     Gout     History of colon polyps 5/3/2018    HTN (hypertension) 3/27/2012    Hyperlipidemia     Hyperlipidemia     LLL pneumonia 6/14/2018    LVH (left ventricular hypertrophy)     Mesenteric ischemia     Murmur, cardiac 3/27/2012    GRICELDA (obstructive sleep apnea)     DOES NOT USE A MACHINE    Sinus problem     Syncope and collapse        Past Surgical History:   Procedure Laterality Date    APPENDECTOMY      BLOCK-NERVE Left 5/31/2016    [facility] OR    CARDIAC CATHETERIZATION      COLONOSCOPY  2011    COLONOSCOPY N/A 5/3/2018    [facility]    COLONOSCOPY N/A 9/10/2015    [facility]    CORONARY ARTERY BYPASS GRAFT  4/2007    x 1    CYSTOSCOPY N/A 8/30/2017    [facility]    CYSTOSCOPY N/A 11/10/2015    [facility]    ESOPHAGOGASTRODUODENOSCOPY (EGD) N/A 1/4/2016    [facility] Tra [facility] (2ND FLR)    ESOPHAGOGASTRODUODENOSCOPY (EGD) N/A 10/15/2014    [facility] ENDO    INJECTION-STEROID-EPIDURAL-TRANSFORAMINAL Left 2/25/2016    [facility] OR    JOINT REPLACEMENT      left knee total replacement  X 3    mid leftt finger      from a cactuss    MOLE REMOVAL  2016    RADIOFREQUENCY THERMOCOAGULATION (RFTC)-NERVE-MEDIAN BRANCH-LUMBAR Left  4/11/2016    [facility] OR    rotative cuff      no rotative cuffs on bilat shoulders has pins     SHOULDER SURGERY      shoulder surgery bilat  RIGHT X 4; LEFT X 3    TRANSRECTAL ULTRASOUND GUIDED PROSTATE BIOPSY Bilateral 11/10/2015    [facility]    ULTRASOUND-ENDOSCOPIC-UPPER N/A 5/31/2017    [facility] ENDO (2ND FLR)    ULTRASOUND-ENDOSCOPIC-UPPER N/A 1/4/2016    Per[facility] Tra [facility] ENDO (2ND FLR)    ULTRASOUND-ENDOSCOPIC-UPPER N/A 1/16/2015    Performed by Jason Saleem MD at [facility] ENDO (2ND FLR)       Current Outpatient Medications   Medication Sig    albuterol (PROVENTIL/VENTOLIN HFA) 90 mcg/actuation inhaler 2 puffs every 4 hours as needed for cough, wheeze, or shortness of breath    albuterol-ipratropium (DUO-NEB) 2.5 mg-0.5 mg/3 mL nebulizer solution INHALE 1 VIAL BY NEBULIZER EVERY 6 HOURS AS NEEDED FOR WHEEZING    allopurinol (ZYLOPRIM) 100 MG tablet Take 1 tablet (100 mg total) by mouth once daily.    aspirin (ECOTRIN) 81 MG EC tablet Take 81 mg by mouth once daily.      atorvastatin (LIPITOR) 80 MG tablet TAKE 1 TABLET (80 MG TOTAL) BY MOUTH ONCE DAILY.    budesonide-formoterol 160-4.5 mcg (SYMBICORT) 160-4.5 mcg/actuation HFAA Inhale 2 puffs into the lungs every 12 (twelve) hours. Controller    carvedilol (COREG) 6.25 MG tablet Take 1 tablet (6.25 mg total) by mouth 2 (two) times daily with meals.    finasteride (PROSCAR) 5 mg tablet TAKE 1 TABLET ONCE DAILY.    fluticasone (FLONASE) 50 mcg/actuation nasal spray 2 sprays (100 mcg total) by Each Nare route once daily.    gabapentin (NEURONTIN) 300 MG capsule Take 1 capsule (300 mg total) by mouth every evening.    isosorbide mononitrate (ISMO,MONOKET) 10 mg tablet TAKE 1 TABLET BY MOUTH EVERY DAY IN THE EVENING    levoFLOXacin (LEVAQUIN) 500 MG tablet Take 1 tablet (500 mg total) by mouth once daily.    losartan (COZAAR) 100  MG tablet Take 1 tablet (100 mg total) by mouth once daily.    meclizine (ANTIVERT) 25 mg tablet TAKE 1 TABLET (25 MG TOTAL) BY MOUTH 3 (THREE) TIMES DAILY AS NEEDED.    mirtazapine (REMERON) 7.5 MG Tab TAKE 1 TABLET BY MOUTH EVERY EVENING.    NITROSTAT 0.4 mg SL tablet PLACE 1 TABLET (0.4 MG TOTAL) UNDER THE TONGUE EVERY 5 (FIVE) MINUTES AS NEEDED FOR CHEST PAIN.    omeprazole (PRILOSEC) 20 MG capsule TAKE 1 CAPSULE BY MOUTH EVERY DAY    sodium chloride (SALINE NASAL MIST) 3 % Mist 1 spray by Nasal route 2 (two) times daily.    tamsulosin (FLOMAX) 0.4 mg Cap Take 1 capsule (0.4 mg total) by mouth once daily.    torsemide (DEMADEX) 20 MG Tab Take 1 tablet (20 mg total) by mouth once daily.    traMADol (ULTRAM) 50 mg tablet Take 1 tablet (50 mg total) by mouth every 12 (twelve) hours as needed for Pain.    zolpidem (AMBIEN) 5 MG Tab TAKE 1 TABLET BY MOUTH EVERY EVENING AS NEEDED     No current facility-administered medications for this visit.        Review of patient's allergies indicates:   Allergen Reactions    Ace inhibitors Other (See Comments)     Cough    Arb-angiotensin receptor antagonist Itching    Eplerenone Other (See Comments)     Marked bradycardia, 40, tiredness and weakness      Sulfa (sulfonamide antibiotics) Itching     Patient says this was 10 years ago and doesn't remember what happened       Family History   Problem Relation Age of Onset    Heart disease Mother     Sudden death Father     Cancer Father         advanced lung ca- found after 2 story fall    Stroke Sister     Pneumonia Sister          from PNA    Heart disease Sister     Hypertension Brother     Kidney cancer Neg Hx     Prostate cancer Neg Hx     Urolithiasis Neg Hx     Allergic rhinitis Neg Hx     Allergies Neg Hx     Angioedema Neg Hx     Asthma Neg Hx     Atopy Neg Hx     Eczema Neg Hx     Immunodeficiency Neg Hx     Rhinitis Neg Hx     Urticaria Neg Hx        Social History     Socioeconomic  History    Marital status:      Spouse name: Not on file    Number of children: Not on file    Years of education: Not on file    Highest education level: Not on file   Social Needs    Financial resource strain: Not on file    Food insecurity - worry: Not on file    Food insecurity - inability: Not on file    Transportation needs - medical: Not on file    Transportation needs - non-medical: Not on file   Occupational History    Not on file   Tobacco Use    Smoking status: Never Smoker    Smokeless tobacco: Never Used   Substance and Sexual Activity    Alcohol use: No    Drug use: No    Sexual activity: Not on file   Other Topics Concern    Not on file   Social History Narrative    [place]; daughter nearby       Chief Complaint:   Chief Complaint   Patient presents with    Left Hand - Pain       History of present illness:  This is a 78-year-old male seen for left hand injury.  Patient was injured originally in October.  Twisted his hand.  He has been seeing . He is in a brace.  Patient had an MRI which shows a TFCC tear. Symptoms are getting worse.  Pain is an 8/10.  Pain is moderate to severe.  Pain with reaching and grabbing.      Review of Systems:    Constitution: Negative for chills, fever, and sweats.  Negative for unexplained weight loss.    HENT:  Negative for headaches and blurry vision.    Cardiovascular:Negative for chest pain or irregular heart beat. Negative for hypertension.    Respiratory:  Negative for cough and shortness of breath.    Gastrointestinal: Negative for abdominal pain, heartburn, melena, nausea, and vomitting.    Genitourinary:  Negative bladder incontinence and dysuria.    Musculoskeletal:  See HPI    Neurological: Negative for numbness.    Psychiatric/Behavioral: Negative for depression.  The patient is not nervous/anxious.      Endocrine: Negative for polyuria    Hematologic/Lymphatic: Negative for bleeding problem.  Does not bruise/bleed  easily.    Skin: Negative for poor would healing and rash      Physical Examination:    Vital Signs:    Vitals:    01/31/19 0904   BP: 130/71   Pulse: 66       Body mass index is 29.82 kg/m².    This a well-developed, well nourished patient in no acute distress.  They are alert and oriented and cooperative to examination.  Pt. walks without an antalgic gait.      Examination of the left hand and wrist shows no signs of rashes or erythema. Patient has no masses ecchymosis or effusions. Patient has some pain with limitation in range of motion of the wrist in flexion and extension as well as ulnar and radial deviation. The patient also has mild limitation in range of motion of all joints in the hand. There are 2+ radial pulse and intact light touch sensation in all 5 digits. Nontender over the anatomic snuffbox.  Tender over the TFCC.    Examination of the right hand and wrist shows no signs of rashes or erythema. Patient has no masses ecchymosis or effusions. Patient has full range of motion of the wrist in flexion and extension as well as ulnar and radial deviation. The patient also has full range of motion of all joints in the hand. There are 2+ radial pulse and intact light touch sensation in all 5 digits. Nontender over the anatomic snuffbox. Negative Tinel's. Negative Finkelstein's test.         X-rays:  X-rays of the left hand were ordered and reviewed which show some moderate degenerative changes throughout the hand and wrist.    MRI of the left wrist:1. Full-thickness partial width tear of the TFCC at the radial attachment.  2. Scattered carpal degenerative changes worst at the radiolunate interval and 1st CMC.  Erosive osteoarthritis is possible.  3. Probable ganglion cyst volar to the 1st CMC joint.     Assessment::  Left TFCC tear  Left wrist degenerative changes    Plan:  Reviewed the arthritis diagnosis with him today. We also discussed the TFCC tear.  I reviewed the x-rays and the MRI with him.  I  recommended consultation with a hand specialist.    This note was created using Zim[employer] voice recognition software that occasionally misinterpreted phrases or words.    Consult note is delivered via Epic messaging service.

## 2019-02-02 ENCOUNTER — OUTPATIENT CASE MANAGEMENT (OUTPATIENT)
Dept: ADMINISTRATIVE | Facility: OTHER | Age: 79
End: 2019-02-02

## 2019-02-02 NOTE — PROGRESS NOTES
01/15/2019  Summary:  RN-ASPEN reviewed chart prior to contacting patient today. Patient with multiple appointments scheduled today, followed by Dialysis, so will be unable to speak with patient today. Will attempt to follow up with patient next week. - JARVIS Kumar RN-OPCM    Plan:  - Assess for receipt of mailed resources. - 12/20/18 Received  - Assess for receipt/completion of Advance Directive/POA paperwork.- 12/20/18 Received, completed, has not brought paperwork in to clinic yet  - Assess for contact with OPCM-LCSW for identified barriers. - SW assessment completed 12/20/18  - Continue to coordinate care with OPCM-LCSW for identified needs.   - Hemodialysis/Hemodialysis Access - Review educational information with patient. - Done 12/20/18  - Peritoneal Dialysis - Review educational information with patient. - Done 12/20/18  - Diet for Chronic Renal Disease - Review educational information with patient.  - Gout - Review educational information with patient.  - Hypertension - Review educational information with patient.  - Healthcare Associated Pneumonia - Review educational information with patient.  - Fall Prevention - Review educational information with patient. - Done 12/20/18       Clinical Reference Documents Added to Patient Instructions       Document    BALANCING CALCIUM AND PHOSPHORUS, KIDNEY DISEASE (ENGLISH)    CHRONIC KIDNEY DISEASE, DIET FOR (ENGLISH)    FALLS, PREVENTING, ARE YOU AT RISK OF FALLING? (ENGLISH)    FALLS, PREVENTING, MAKING CHANGES IN YOUR LIVING SPACE (ENGLISH)    GOUT (ENGLISH)    GOUT ATTACKS, TREATING (ENGLISH)    GOUT DIET (ENGLISH)    GOUT, WHAT IS (ENGLISH)    HEMODIALYSIS (ENGLISH)    HEMODIALYSIS ACCESS BLEEDING (ENGLISH)    HEMODIALYSIS ACCESS, CARING FOR YOUR (ENGLISH)    HYPERTENSION, ESTABLISHED (ENGLISH)    KIDNEY DISEASE, HIGH BLOOD PRESSURE AND (ENGLISH)    KIDNEY DISEASE, LIVING WITH HIGH BLOOD PRESSURE AND (ENGLISH)    KIDNEY FAILURE, TREATMENT OPTIONS FOR (ENGLISH)     KIDNEY FAILURE: YOUR HEALTHCARE TEAM (ENGLISH)    PD CATHETER AND EXIT SITE, CARING FOR YOUR (ENGLISH)    PD PERITONEAL DIALYSIS (ENGLISH)    PLACING THE CATHETER, PD CATHETER ACCESS (ENGLISH)    PNEUMONIA, HEALTHCARE-ASSOCIATED (ENGLISH)    REDUCING POTASSIUM IN FOODS, KIDNEY DISEASE (ENGLISH)    SODIUM, EATING LESS: KIDNEY DISEASE (ENGLISH)

## 2019-02-02 NOTE — PROGRESS NOTES
01/18/19  Summary: RN-CM contacted patient to follow up for OPCM. Spoke with patient via telephone.  reports that he has been doing okay, other than the pain in his left wrist. Reports that he had an MRI done of his left wrist and he has a torn ligament and needs to have surgery. Reports that he will be seeing an orthopedic doctor in [place] next week. Complains of worsening right knee pain; had a TKA of right knee but has osteoarthritis and made an appointment with Dr. Phan again to see what options he has for pain control. Patient states that his life revolves around doctor appointments, lab work and dialysis. He states that he is satisfied with hemodialysis and will not be pursuing peritoneal dialysis at this time. He states that he would not be comfortable doing it on his own at home and he doesn't really mind sitting in the dialysis chair because he can watch TV, read or sleep. We reviewed the S/S of problems with his dialysis access. Patient was able to correctly state 3: Can't feel the thrill; numbness in arm or hand; warmth, redness, bleeding or blue access. We reviewed the ways of preventing problems with dialysis access and patient was able to correctly state 3: No blood pressure, lab sticks or IV's in left arm; keep access area clean; be careful not to hit or bump anything against the access.Denies any S/S and states he has not had any issues with his access. He states that since starting dialysis he has been feeling much better and his doctor reduced one of his medications (torsemide) because his blood pressure was dropping to low at dialysis and they were having to give him fluids. Reviewed diet for Chronic Kidney Disease (LOUISA) with patient: limit fluids, sodium and too much protein; avoid foods high in potassium and phosphorus. Provided patient with a list of high potassium and his phosphorus foods. Discussed proper serving sizes of protein (about the size of a deck of playing cards).  Reports that he has received education on proper diet in the past and does his best to comply. Encouraged patient to keep copy of diet resources for reference and to share with his daughter, as she cooks meals for patients also. Verbalized understanding and appreciation for resources. Reviewed S/S of Hypertension: headache, dizziness, vision changes vision, chest pain, and shortness of breath, and Hypotension: dizziness, lightheaded, fainting. Reminded patient of CKD goal of blood pressure less than 130/80. Patient states he has more problems with hypotension lately and is well aware of the S/S as he has dropped pretty low since starting dialysis. Patient expressed appreciation for outreach and is in agreement with a follow up call in 2 weeks. - JARVIS Kumar RN-OPC    Interventions:  - Reviewed Diet for Chronic Kidney Disease.  - Reviewed S/S of problems with Hemodialysis access, ways of preventing problems with Hemodialysis access.  - Reviewed S/S of Hypertension and Hypotension; reviewed CKD goal of blood pressure <130/80.    - Empowered patient/caregiver to discuss treatment plan with Physician/care team.   - Reviewed upcoming scheduled appointments with patient.   - Encouraged compliance with Physician follow-ups.  - Encouraged continued compliance with dialysis.   - Encourageed compliance with scheduled laboratory testing and procedure.   - Encouraged Dietary Compliance (Renal Diet, Low Sodium).   - Encouraged Exercise as tolerated.   - Encouraged Medication Compliance.    Plan:  - Assess for receipt of mailed resources. - 12/20/18 Received  - Assess for receipt/completion of Advance Directive/POA paperwork.- 12/20/18 Received, completed, has not brought paperwork in to clinic yet  - Assess for contact with OPCM-LCSW for identified barriers. - SW assessment completed 12/20/18  - Continue to coordinate care with OPCM-LCSW for identified needs.  - Hemodialysis/Hemodialysis Access - Review educational information  with patient. - Done 12/20/18, 1/18/19  - Peritoneal Dialysis - Review educational information with patient. - Done 12/20/18, 1/18/19 Deferred, not interested  - Diet for Chronic Renal Disease - Review educational information with patient. - Done 1/18/19  - Gout - Review educational information with patient.  - Hypertension - Review educational information with patient. - Done 1/18/19  - Healthcare Associated Pneumonia - Review educational information with patient.  - Fall Prevention - Review educational information with patient. - Done 12/20/18    OPCM Self-Management Care Plan was reviewed with the patients input and was based on identified barriers from todays assessment.  Goals were reviewed today with the patient and the patient has agreed to work towards these goals to improve his overall well-being. Patient verbalized understanding of the care plan, goals, and all of today's instructions. Encouraged patient to communicate with his physician and health care team about health conditions and the treatment plan.  Provided my contact information today and encouraged patient to call me with any questions as needed. - JARVIS Kumar RN-OPCM     Clinical Reference Documents Added to Patient Instructions       Document    BALANCING CALCIUM AND PHOSPHORUS, KIDNEY DISEASE (ENGLISH)    CHRONIC KIDNEY DISEASE, DIET FOR (ENGLISH)    FALLS, PREVENTING, ARE YOU AT RISK OF FALLING? (ENGLISH)    FALLS, PREVENTING, MAKING CHANGES IN YOUR LIVING SPACE (ENGLISH)    GOUT (ENGLISH)    GOUT ATTACKS, TREATING (ENGLISH)    GOUT DIET (ENGLISH)    GOUT, WHAT IS (ENGLISH)    HEMODIALYSIS (ENGLISH)    HEMODIALYSIS ACCESS BLEEDING (ENGLISH)    HEMODIALYSIS ACCESS, CARING FOR YOUR (ENGLISH)    HYPERTENSION, ESTABLISHED (ENGLISH)    KIDNEY DISEASE, HIGH BLOOD PRESSURE AND (ENGLISH)    KIDNEY DISEASE, LIVING WITH HIGH BLOOD PRESSURE AND (ENGLISH)    KIDNEY FAILURE, TREATMENT OPTIONS FOR (ENGLISH)    KIDNEY FAILURE: YOUR HEALTHCARE TEAM  (ENGLISH)    PD CATHETER AND EXIT SITE, CARING FOR YOUR (ENGLISH)    PD PERITONEAL DIALYSIS (ENGLISH)    PLACING THE CATHETER, PD CATHETER ACCESS (ENGLISH)    PNEUMONIA, HEALTHCARE-ASSOCIATED (ENGLISH)    REDUCING POTASSIUM IN FOODS, KIDNEY DISEASE (ENGLISH)    SODIUM, EATING LESS: KIDNEY DISEASE (ENGLISH)

## 2019-02-02 NOTE — PROGRESS NOTES
01/08/2019  Summary:  RN-ASPEN contacted patient to follow up for OPCM. Spoke briefly with Mr. Hunt via telephone. Reports that he is doing pretty well other than some left wrist pain. I asked patient if it was pain from gout as he has a history of gout. Reports that he has been working on a home project, building a porch. The pain started after he started the project. He said he rested it, elevated it and iced it, but none of these really helped. He stated that it is probably carpal tunnel because he was supposed to have surgery on that wrist awhile back, but there were too many things going on at that time. He states that he is at the doctors' office right now to have have it evaluated. Stated that other than that, he is doing well and going to his dialysis appointments as scheduled. He requested that I call him back next week. Will call back as requested. - JARVIS Kumar RN-OPCM     Interventions:  None     Plan:  - Assess for receipt of mailed resources. - 12/20/18 Received  - Assess for receipt/completion of Advance Directive/POA paperwork.- 12/20/18 Received, completed, has not brought paperwork in to clinic yet  - Assess for contact with OPCM-LCSW for identified barriers. - SW assessment completed 12/20/18  - Continue to coordinate care with OPCM-LCSW for identified needs.   - Hemodialysis/Hemodialysis Access - Review educational information with patient. - Done 12/20/18  - Peritoneal Dialysis - Review educational information with patient. - Done 12/20/18  - Diet for Chronic Renal Disease - Review educational information with patient.  - Gout - Review educational information with patient.  - Hypertension - Review educational information with patient.  - Healthcare Associated Pneumonia - Review educational information with patient.  - Fall Prevention - Review educational information with patient. - Done 12/20/18    Todays OPCM Self-Management Care Plan was reviewed with the patients input and was based on  identified barriers from todays assessment.  Goals were reviewed today with the patient and the patient has agreed to work towards these goals to improve his overall well-being. Patient verbalized understanding of the care plan, goals, and all of today's instructions. Encouraged patient to communicate with his physician and health care team about health conditions and the treatment plan.  Provided my contact information today and encouraged patient to call me with any questions as needed.     Clinical Reference Documents Added to Patient Instructions       Document    BALANCING CALCIUM AND PHOSPHORUS, KIDNEY DISEASE (ENGLISH)    CHRONIC KIDNEY DISEASE, DIET FOR (ENGLISH)    FALLS, PREVENTING, ARE YOU AT RISK OF FALLING? (ENGLISH)    FALLS, PREVENTING, MAKING CHANGES IN YOUR LIVING SPACE (ENGLISH)    GOUT (ENGLISH)    GOUT ATTACKS, TREATING (ENGLISH)    GOUT DIET (ENGLISH)    GOUT, WHAT IS (ENGLISH)    HEMODIALYSIS (ENGLISH)    HEMODIALYSIS ACCESS BLEEDING (ENGLISH)    HEMODIALYSIS ACCESS, CARING FOR YOUR (ENGLISH)    HYPERTENSION, ESTABLISHED (ENGLISH)    KIDNEY DISEASE, HIGH BLOOD PRESSURE AND (ENGLISH)    KIDNEY DISEASE, LIVING WITH HIGH BLOOD PRESSURE AND (ENGLISH)    KIDNEY FAILURE, TREATMENT OPTIONS FOR (ENGLISH)    KIDNEY FAILURE: YOUR HEALTHCARE TEAM (ENGLISH)    PD CATHETER AND EXIT SITE, CARING FOR YOUR (ENGLISH)    PD PERITONEAL DIALYSIS (ENGLISH)    PLACING THE CATHETER, PD CATHETER ACCESS (ENGLISH)    PNEUMONIA, HEALTHCARE-ASSOCIATED (ENGLISH)    REDUCING POTASSIUM IN FOODS, KIDNEY DISEASE (ENGLISH)    SODIUM, EATING LESS: KIDNEY DISEASE (ENGLISH)

## 2019-02-03 NOTE — PROGRESS NOTES
02/02/2019  Summary:  (1st Attempt)  RN-CM attempted to contact patient to follow up for OPCM. Left message requesting return call. Will attempt to contact patient next week to complete follow up. - JARVIS Kumar RN-OPCM    Interventions:  - Left message requesting a return call.  - Follow up call scheduled for next week.     Plan:  - Assess for receipt of mailed resources. - 12/20/18 Received  - Assess for receipt/completion of Advance Directive/POA paperwork.- 12/20/18 Received, completed, has not brought paperwork in to clinic yet  - Assess for contact with OPCM-LCSW for identified barriers. - SW assessment completed 12/20/18  - Continue to coordinate care with OPCM-LCSW for identified needs.  - Hemodialysis/Hemodialysis Access - Review educational information with patient. - Done 12/20/18, 1/18/19  - Peritoneal Dialysis - Review educational information with patient. - Done 12/20/18, 1/18/19 Deferred, not interested  - Diet for Chronic Renal Disease - Review educational information with patient. - Done 1/18/19  - Gout - Review educational information with patient.  - Hypertension - Review educational information with patient. - Done 1/18/19  - Healthcare Associated Pneumonia - Review educational information with patient.  - Fall Prevention - Review educational information with patient. - Done 12/20/18

## 2019-02-04 ENCOUNTER — OUTPATIENT CASE MANAGEMENT (OUTPATIENT)
Dept: ADMINISTRATIVE | Facility: OTHER | Age: 79
End: 2019-02-04

## 2019-02-04 RX ORDER — LOSARTAN POTASSIUM 100 MG/1
TABLET ORAL
Qty: 90 TABLET | Refills: 0 | Status: ON HOLD | OUTPATIENT
Start: 2019-02-04 | End: 2019-03-26 | Stop reason: CLARIF

## 2019-02-04 RX ORDER — ALLOPURINOL 100 MG/1
TABLET ORAL
Qty: 90 TABLET | Refills: 1 | Status: ON HOLD | OUTPATIENT
Start: 2019-02-04 | End: 2019-03-26 | Stop reason: CLARIF

## 2019-02-04 NOTE — PROGRESS NOTES
2/4/2019  Summary:   attempted to contact patient via telephone for follow up.      Interventions:  Left a voicemail message  Mailed letter to patient's home.     Plan:  Close case on 6/11 if patient does not return my call by then.   Follow up with Tonny- 1/10  Ensure that patient received mail-  Did patient receive [employer] brochure?

## 2019-02-04 NOTE — LETTER
February 4, 2019    Micheal Hunt  [address]  [address]  [place] [address]             [facility] [address] [facility] [address] [place] [address] Dear  Marilee:      I am writing from the Outpatient Complex Care Management Department at [facility].  I have been unsuccessful at reaching you to follow up with you to see how you have been doing. I hope you find the resources previously provided to you helpful. Please contact me for further assistance.    I can be reached at [phone] Monday thru Friday from 8:00am to 4:30pm.  [facility] also has a program with a nurse available 24/7 to answer questions or provide medical advice.  [facility] can be reached at [phone].    If you have any questions or concerns, please don't hesitate to call.    Sincerely,        Luis Alberto Marcus, LCSW-BACS

## 2019-02-05 ENCOUNTER — OFFICE VISIT (OUTPATIENT)
Dept: PHYSICAL MEDICINE AND REHAB | Facility: CLINIC | Age: 79
End: 2019-02-05
Payer: MEDICARE

## 2019-02-05 ENCOUNTER — PROCEDURE VISIT (OUTPATIENT)
Dept: PHYSICAL MEDICINE AND REHAB | Facility: CLINIC | Age: 79
End: 2019-02-05
Payer: MEDICARE

## 2019-02-05 VITALS
HEART RATE: 72 BPM | WEIGHT: 231 LBS | SYSTOLIC BLOOD PRESSURE: 106 MMHG | BODY MASS INDEX: 30.62 KG/M2 | HEIGHT: 73 IN | DIASTOLIC BLOOD PRESSURE: 66 MMHG

## 2019-02-05 DIAGNOSIS — M17.11 PRIMARY OSTEOARTHRITIS OF RIGHT KNEE: ICD-10-CM

## 2019-02-05 DIAGNOSIS — R42 VERTIGO: ICD-10-CM

## 2019-02-05 DIAGNOSIS — G47.9 SLEEP DISORDER: ICD-10-CM

## 2019-02-05 DIAGNOSIS — G56.00 CARPAL TUNNEL SYNDROME, UNSPECIFIED LATERALITY: ICD-10-CM

## 2019-02-05 DIAGNOSIS — M79.602 PAIN OF LEFT UPPER EXTREMITY: ICD-10-CM

## 2019-02-05 DIAGNOSIS — M24.20 LIGAMENT LAXITY: ICD-10-CM

## 2019-02-05 DIAGNOSIS — R20.0 HAND NUMBNESS: ICD-10-CM

## 2019-02-05 PROCEDURE — 99499 UNLISTED E&M SERVICE: CPT | Mod: S$PBB,,, | Performed by: PHYSICAL MEDICINE & REHABILITATION

## 2019-02-05 PROCEDURE — 95908 PR NERVE CONDUCTION STUDY; 3-4 STUDIES: ICD-10-PCS | Mod: 26,59,S$PBB, | Performed by: PHYSICAL MEDICINE & REHABILITATION

## 2019-02-05 PROCEDURE — 99214 OFFICE O/P EST MOD 30 MIN: CPT | Mod: PBBFAC,PN | Performed by: PHYSICAL MEDICINE & REHABILITATION

## 2019-02-05 PROCEDURE — 95886 MUSC TEST DONE W/N TEST COMP: CPT | Mod: PBBFAC,PN | Performed by: PHYSICAL MEDICINE & REHABILITATION

## 2019-02-05 PROCEDURE — 95908 NRV CNDJ TST 3-4 STUDIES: CPT | Mod: 26,59,S$PBB, | Performed by: PHYSICAL MEDICINE & REHABILITATION

## 2019-02-05 PROCEDURE — 99999 PR PBB SHADOW E&M-EST. PATIENT-LVL IV: CPT | Mod: PBBFAC,,, | Performed by: PHYSICAL MEDICINE & REHABILITATION

## 2019-02-05 PROCEDURE — 95886 MUSC TEST DONE W/N TEST COMP: CPT | Mod: 26,59,S$PBB, | Performed by: PHYSICAL MEDICINE & REHABILITATION

## 2019-02-05 PROCEDURE — 95908 NRV CNDJ TST 3-4 STUDIES: CPT | Mod: PBBFAC,PN,59 | Performed by: PHYSICAL MEDICINE & REHABILITATION

## 2019-02-05 PROCEDURE — 95886 PR EMG COMPLETE, W/ NERVE CONDUCTION STUDIES, 5+ MUSCLES: ICD-10-PCS | Mod: 26,59,S$PBB, | Performed by: PHYSICAL MEDICINE & REHABILITATION

## 2019-02-05 PROCEDURE — 20611 DRAIN/INJ JOINT/BURSA W/US: CPT | Mod: PBBFAC,PN | Performed by: PHYSICAL MEDICINE & REHABILITATION

## 2019-02-05 PROCEDURE — 99999 PR PBB SHADOW E&M-EST. PATIENT-LVL IV: ICD-10-PCS | Mod: PBBFAC,,, | Performed by: PHYSICAL MEDICINE & REHABILITATION

## 2019-02-05 PROCEDURE — 20610 DRAIN/INJ JOINT/BURSA W/O US: CPT | Mod: PBBFAC,PN

## 2019-02-05 PROCEDURE — 20611 PR DRAIN/ASP/INJECT MAJOR JOINT/BURSA W/US GUIDANCE: ICD-10-PCS | Mod: S$PBB,RT,, | Performed by: PHYSICAL MEDICINE & REHABILITATION

## 2019-02-05 PROCEDURE — 99499 NO LOS: ICD-10-PCS | Mod: S$PBB,,, | Performed by: PHYSICAL MEDICINE & REHABILITATION

## 2019-02-05 PROCEDURE — 20611 DRAIN/INJ JOINT/BURSA W/US: CPT | Mod: S$PBB,RT,, | Performed by: PHYSICAL MEDICINE & REHABILITATION

## 2019-02-05 RX ADMIN — Medication 48 MG: at 01:02

## 2019-02-05 NOTE — PROGRESS NOTES
"    PROCEDURE NOTE: risk and benefit of right synvisc injection reviewed with. patient. Verbal consent was obtained. Injection was performed after sterile prep w. chlorohexedine. Lateral approach used. Ultrasound guidance was utilized for needle visualization. Viscosupplementation has been shown to have greater efficiency with Ultrasound guidance. Area was first anesthesized with lidocaine 1% and than injected via lateral approach w. An 18g 1.5 " needle. Synvisc one was used. No complications.   Ultrasound interpretation was performed prior to the procedure to identify the target and any adjacent neurovascular structures.  Subsequently, interpretation was performed during real- time needle guidance confirming placement. Post- intervention interpretation was also performed confirming appropriate injectate flow and hemostasis.  Images indicating needle placement have been saved in [email or web address].        "

## 2019-02-05 NOTE — PROCEDURES
Procedures   [facility]  Yanira Whitehead D.O.  Physical Medicine and Rehab  Phone: [phone]  Fax: [phone]    Neurography & Electromyography Report              Patient: Micheal Hunt   Patient ID: [id number]   Sex:     Date of Birth:     Age:     Notes:     Last visit date: 2/5/2019             Visit date and time: 2/5/2019 12:39   Patient Age on Visit Date:     Referring Physician:     Diagnoses:               Sensory NCS      Nerve / Sites Rec. Site Onset Lat Peak Lat Ref. NP Amp Ref. PP Amp Ref. Segments Distance Velocity     ms ms ms µV µV µV µV  cm m/s   L Median - Digit II (Antidromic)      Wrist Dig II 3.80 4.69 ?3.40 6.0 ?15.0 3.1 ?20.0 Wrist - Dig II 13 34   L Ulnar - Digit V (Antidromic)      Wrist Dig V 2.71 3.23 ?3.10 3.8 ?10.0 22.7 ?15.0 Wrist - Dig V 11 41           Motor NCS      Nerve / Sites Muscle Latency Ref. Amplitude Ref. Amp % Duration Segments Distance Lat Diff Velocity Ref.     ms ms mV mV % ms  cm ms m/s m/s   L Median - APB      Wrist APB 4.79 ?4.40 8.8 ?4.0 100 8.33 Wrist - APB 7         Elbow APB 10.16  8.5  96.5 8.49 Elbow - Wrist 22 5.36 41 ?49   L Ulnar - ADM      Wrist ADM 3.13 ?3.60 9.2 ?5.0 100 8.23 Wrist - ADM 7         B.Elbow ADM 7.50  8.9  96.5 8.85 B.Elbow - Wrist 22 4.38 50 ?49      A.Elbow ADM 10.05  8.1  88.2 8.75 A.Elbow - B.Elbow 10 2.55 39 ?49           EMG Summary Table     Spontaneous MUAP Recruitment   Muscle IA Fib PSW Fasc H.F. Amp Dur. PPP Pattern   L. Extensor carpi radialis brevis N None None None None N N N N   L. First dorsal interosseous N None None None None 1+ 1+ 1+ Reduced   L. Triceps brachii N None None None None N N N N   L. Deltoid N None None None None N N N N   L. Biceps brachii N None None None None N N N N   L. Flexor carpi ulnaris N None None None None 1+ 1+ 1+ Reduced   L. Cervical paraspinals (mid) N None None None None N N N N   L. Cervical paraspinals (low) N None None None None N N N N           Summary    The motor  conduction test had results outside of the specified normal range in all 2 of the tested nerves:   In the L Median - APB study  o the take off latency result was increased for Wrist stimulation  o the take off velocity result was reduced for Elbow - Wrist segment   In the L Ulnar - ADM study  o the take off velocity result was reduced for A.Elbow - B.Elbow segment    The sensory conduction test had results outside of the specified normal range in all 2 of the tested nerves:   In the L Median - Digit II (Antidromic) study  o the peak latency result was increased for Wrist stimulation  o the peak amplitude result was reduced for Wrist stimulation   In the L Ulnar - Digit V (Antidromic) study  o the peak latency result was increased for Wrist stimulation  o the peak amplitude result was reduced for Wrist stimulation    The needle EMG examination was performed in 8 muscles. It was normal in 6 muscle(s): L. Extensor carpi radialis brevis, L. Triceps brachii, L. Deltoid, L. Biceps brachii, L. Cervical paraspinals (mid), L. Cervical paraspinals (low). The study was abnormal in 2 muscle(s), with the following distribution:   The MUP waveform abnormality was found in L. First dorsal interosseous, L. Flexor carpi ulnaris.   Abnormal interference pattern was found in L. First dorsal interosseous, L. Flexor carpi ulnaris.              Conclusion:     Moderate left carpal tunnel syndrome  Mild to moderate left ulnar neuropathy at elbow          ____________________________  Yanira Whitehead D.O.

## 2019-02-06 ENCOUNTER — TELEPHONE (OUTPATIENT)
Dept: PULMONOLOGY | Facility: CLINIC | Age: 79
End: 2019-02-06

## 2019-02-06 ENCOUNTER — PATIENT MESSAGE (OUTPATIENT)
Dept: FAMILY MEDICINE | Facility: CLINIC | Age: 79
End: 2019-02-06

## 2019-02-06 DIAGNOSIS — N40.1 BPH WITH OBSTRUCTION/LOWER URINARY TRACT SYMPTOMS: ICD-10-CM

## 2019-02-06 DIAGNOSIS — N13.8 BPH WITH OBSTRUCTION/LOWER URINARY TRACT SYMPTOMS: ICD-10-CM

## 2019-02-06 RX ORDER — ZOLPIDEM TARTRATE 5 MG/1
TABLET ORAL
Qty: 30 TABLET | Refills: 3 | Status: ON HOLD | OUTPATIENT
Start: 2019-02-06 | End: 2019-04-08 | Stop reason: HOSPADM

## 2019-02-06 RX ORDER — MONTELUKAST SODIUM 10 MG/1
10 TABLET ORAL NIGHTLY
Qty: 90 TABLET | Refills: 1 | Status: SHIPPED | OUTPATIENT
Start: 2019-02-06 | End: 2019-03-08

## 2019-02-06 RX ORDER — MECLIZINE HYDROCHLORIDE 25 MG/1
TABLET ORAL
Qty: 90 TABLET | Refills: 0 | Status: ON HOLD | OUTPATIENT
Start: 2019-02-06 | End: 2019-03-26 | Stop reason: CLARIF

## 2019-02-06 RX ORDER — TAMSULOSIN HYDROCHLORIDE 0.4 MG/1
CAPSULE ORAL
Qty: 30 CAPSULE | Refills: 2 | Status: SHIPPED | OUTPATIENT
Start: 2019-02-06 | End: 2019-10-11 | Stop reason: SDUPTHER

## 2019-02-06 NOTE — TELEPHONE ENCOUNTER
Patient having teeth cleaned tomorrow and needs a clearance. Patient will call back with Name and number of dentist.

## 2019-02-06 NOTE — TELEPHONE ENCOUNTER
"Contacted pt. Pt stated he has been coughing up mucus particularly at night. Stated he "has not looked at the color" advised pt if mucus is yellow or green to take his antibiotic as prescribed according to last office visit note. Pt requested to come in. Scheduled for tomorrow.   ----- Message from Jose Nix sent at 2/6/2019  1:24 PM CST -----  Contact: self   Patient is coughing and has mucus building up in his lungs, please call back at [phone] (home)            "

## 2019-02-07 ENCOUNTER — OFFICE VISIT (OUTPATIENT)
Dept: PULMONOLOGY | Facility: CLINIC | Age: 79
End: 2019-02-07
Payer: MEDICARE

## 2019-02-07 ENCOUNTER — HOSPITAL ENCOUNTER (OUTPATIENT)
Dept: RADIOLOGY | Facility: HOSPITAL | Age: 79
Discharge: HOME | End: 2019-02-07
Attending: NURSE PRACTITIONER
Payer: MEDICARE

## 2019-02-07 VITALS
HEIGHT: 73 IN | WEIGHT: 229.94 LBS | DIASTOLIC BLOOD PRESSURE: 84 MMHG | HEART RATE: 76 BPM | SYSTOLIC BLOOD PRESSURE: 127 MMHG | BODY MASS INDEX: 30.47 KG/M2 | OXYGEN SATURATION: 95 %

## 2019-02-07 DIAGNOSIS — D80.9 IMMUNOGLOBULIN DEFICIENCY: ICD-10-CM

## 2019-02-07 DIAGNOSIS — J18.9 PNEUMONIA OF BOTH LUNGS DUE TO INFECTIOUS ORGANISM, UNSPECIFIED PART OF LUNG: ICD-10-CM

## 2019-02-07 DIAGNOSIS — J45.901 MILD ASTHMA WITH ACUTE EXACERBATION, UNSPECIFIED WHETHER PERSISTENT: Primary | ICD-10-CM

## 2019-02-07 DIAGNOSIS — J45.901 MILD ASTHMA WITH ACUTE EXACERBATION, UNSPECIFIED WHETHER PERSISTENT: ICD-10-CM

## 2019-02-07 PROBLEM — N18.6 ANEMIA DUE TO CHRONIC KIDNEY DISEASE, ON CHRONIC DIALYSIS: Status: ACTIVE | Noted: 2018-02-25

## 2019-02-07 PROBLEM — Z99.2 ANEMIA DUE TO CHRONIC KIDNEY DISEASE, ON CHRONIC DIALYSIS: Status: ACTIVE | Noted: 2018-02-25

## 2019-02-07 PROCEDURE — 99213 OFFICE O/P EST LOW 20 MIN: CPT | Mod: S$PBB,,, | Performed by: NURSE PRACTITIONER

## 2019-02-07 PROCEDURE — 71046 X-RAY EXAM CHEST 2 VIEWS: CPT | Mod: TC,FY

## 2019-02-07 PROCEDURE — 99999 PR PBB SHADOW E&M-EST. PATIENT-LVL III: CPT | Mod: PBBFAC,,, | Performed by: NURSE PRACTITIONER

## 2019-02-07 PROCEDURE — 99213 PR OFFICE/OUTPT VISIT, EST, LEVL III, 20-29 MIN: ICD-10-PCS | Mod: S$PBB,,, | Performed by: NURSE PRACTITIONER

## 2019-02-07 PROCEDURE — 99999 PR PBB SHADOW E&M-EST. PATIENT-LVL III: ICD-10-PCS | Mod: PBBFAC,,, | Performed by: NURSE PRACTITIONER

## 2019-02-07 PROCEDURE — 99213 OFFICE O/P EST LOW 20 MIN: CPT | Mod: PBBFAC,PO,25 | Performed by: NURSE PRACTITIONER

## 2019-02-07 PROCEDURE — 71046 X-RAY EXAM CHEST 2 VIEWS: CPT | Mod: 26,,, | Performed by: RADIOLOGY

## 2019-02-07 PROCEDURE — 71046 XR CHEST PA AND LATERAL: ICD-10-PCS | Mod: 26,,, | Performed by: RADIOLOGY

## 2019-02-07 RX ORDER — PREDNISONE 20 MG/1
TABLET ORAL
Qty: 9 TABLET | Refills: 2 | Status: SHIPPED | OUTPATIENT
Start: 2019-02-07 | End: 2019-02-21 | Stop reason: SDUPTHER

## 2019-02-07 NOTE — PATIENT INSTRUCTIONS
Prednisone 20 mg a day for 3 days for wheeze, cough, and shortness of breath.    Sputum culture, please bring to hospital with in 4 hours of collecting  Only bring in sputum if the color is yellow or green.    Chest x-ray today to evaluate for Pneumonia    Prenair 13 vaccine sent to [facility] pharmacy

## 2019-02-07 NOTE — PROGRESS NOTES
2/7/2019    Micheal Hunt  Office Note    Chief Complaint   Patient presents with    Follow-up     for about one week, coughing. doesn't want it to turn into pneumonia    Pneumonia    Cough    Shortness of Breath       HPI: 02/07/2019- Cough: onset 1 week, worse at night, productive white in color. SOB with exertion, relieved with rest, and albuterol.  No nocturnal arousals, Albuterol rescue inhaler 1x daily, taking symbicort daily.      11/21/18 Admitted [facility] for Pneumonia discharged Admit Date: 11/5/2018-11/09/2018.   SOB improved since discharge, SOB onset few months Summer time, SOB and chest tightness with exertion walking, relieved with rest. Not currently taking symbicort inhaler or albuterol inhaler.   Currently on Cipro half dose due ESRD, on dialysis.      Previous HPI Dr. 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     Has yr round sinus problems - drainage ,  Clear to green,  abx help but avoids with renal dz.  DR Nails did sinus work - saw Dr Bartlett - penuomococcal titers were low, no vaccine     Lots of cough and h/o wheezes, not sob.  Has lung burning off and on  - burns when exerts - releif with rest.  Duration 5-10 min but stops with rest.     Sees Dr Dunlap,   Started 2010 when moved to [facility]- onset with chest burning while loading hay.      pft with low MVV, denies sob now nor muscle weakness.     Bell Wilkinson NP   HPI:   Micheal Hunt is a 79 yo male with PMHx of CKD stage 5, CAD, HTN, BPH, and HLD.  He was admitted to the service of hospital medicine with HCAP.  He presented to the ED with a one day onset of fever and cough.  Symptoms were associated with fatigue, nausea, wheezing and hemoptysis. He stated that he was recently admitted for a stomach virus about three weeks ago.  He denied chest pain, sob, abdominal pain, vomiting, diarrhea, dysuria and leg swelling.     * No surgery found *        Hospital Course:   Patient monitored closely during hospitalization. Telemetry ordered. He did have a short run of Vtach and was asymptomatic. Cardiology consulted. He was initiated on IV antibiotics for pneumonia. Pulmonary consulted. He received oral steroids during admission. Nephrology consulted for CKD stage V management. Patient encouraged to initiated HD, however is not open to starting this admission, He wishes to follow up with Dr. Bishop. He was noted to have diastolic HF and received Lasix 60 mg IV with good response. Dyspnea improved. ECHO obtained. Labs monitored. Procalcitonin negative. He is feeling better and is stable for DC from pulmonary, nephrology, and cardiac standpoint.      PE; chest coarse. Heart RRR with Murmum LUE fistula      Mihir Guillen MD Physician Pulmonology   Nov 7, sl wheezes, feels better, ask for tramadol tid (renal failure max dose 200/d),  Follows dr Bishop,    Nov 8- feels better but had small amount brb hemoptysis with some chest pain this am. Smaller amount than 2 prior episodes.  Nov 9, says chest discomfort (denied chest pain when I initially saw pt) and cough are resolved.  Feels well now.     Plan:   Nov 7, sl wheezes, feels better, ask for tramadol tid (renal failure max dose 200/d),  Follows dr Bishop,  Pt ambulating and seems to be doing well.     Proteinuria noted, non gap acidosis likely renal- bicarb reasonable.  There is no pulmonary artery aneursym.       Pt was on oral cipro sice 10/ bid with renal failure and apparently  Failed. There are no blood cultures resulted on epic at this time?  No sputum submitted?   Order bicarb and dose prednisone x2.       Best to monitor another day as failed outpt abx?        Addendum- pt says did not take cipro for fear side effects.        Nov 8- ct reviewed as was today's cxr.  Pt is being rx for abx after presenting with fever and chill and hemoptysis - ct had airways open and lll infiltrate.  Pt had some brb  hemoptysis today.     cta would be a problem with renal status. Will check d dimer and u/s legs and monitor.  Airways ok on ct -  Dx not clear.  Pt is on low dose steroid.  There is no concern re pulm art aneurysm.     Nov 9,  Sputum from yesterday pending this am.  Pt did not use cipro pta- was ordered but pt didn't take.  Pt did have chill, fever, hemoptysis at admit with ? vague slight recurrence yesterday?  procalcitonin was 0.18 admit, below 0.10 would suggest not pneumonia.  No cta due to renal failure.       If no dvt on leg u/s this am--outpt on abx reasonable.  Will f/u in office with ct to assure left lower lung abn clears.  Working dx is pneumonia- has some atypical features.       Electronically signed by Mihir Guillen MD at 11/9/2018       The chief compliant  problem is new to me  PFSH:  Past Medical History:   Diagnosis Date    NICK (acute kidney injury) 7/29/2016    Allergy     Anemia, mild 12/15/2014    Arthritis     Gout    Benign essential HTN 3/27/2012    BMI 29.0-29.9,adult 5/10/2018    BPH (benign prostatic hyperplasia)     BPH (benign prostatic hyperplasia)     CAD (coronary artery disease) 2006    Chronic kidney disease     due to ibuprofen    Colon polyp     CRF (chronic renal failure), stage 5     Diverticulosis     Gastritis     GERD (gastroesophageal reflux disease)     Gout     History of colon polyps 5/3/2018    HTN (hypertension) 3/27/2012    Hyperlipidemia     Hyperlipidemia     LLL pneumonia 6/14/2018    LVH (left ventricular hypertrophy)     Mesenteric ischemia     Murmur, cardiac 3/27/2012    GRICELDA (obstructive sleep apnea)     DOES NOT USE A MACHINE    Sinus problem     Syncope and collapse          Past Surgical History:   Procedure Laterality Date    APPENDECTOMY      BLOCK-NERVE Left 5/31/2016    [facility]    CARDIAC CATHETERIZATION      COLONOSCOPY  2011    COLONOSCOPY N/A 5/3/2018    Per[facility] by Messi Harris [facility]    COLONOSCOPY N/A 9/10/2015    [facility] at [facility]    CORONARY ARTERY BYPASS GRAFT  4/2007    x 1    CYSTOSCOPY N/A 2017    [facility]    CYSTOSCOPY N/A 11/10/2015    Performed by [facility]ring [facility] at [facility] OR    ESOPHAGOGASTRODUODENOSCOPY (EGD) N/A 2016    Performed by Tra Brooks [facility] at [facility] ENDO (2ND FLR)    ESOPHAGOGASTRODUODENOSCOPY (EGD) N/A 10/15/2014    Performed by Messi Harris [facility] at [facility] END[facility]    INJECTION-STEROID-EPIDURAL-TRANSFORAMINAL Left 2016    [facility] at [facility] OR    JOINT REPLACEMENT      left knee total replacement  X 3    mid leftt finger      from a cactuss    MOLE REMOVAL      RADIOFREQUENCY THERMOCOAGULATION (RFTC)-NERVE-MEDIAN BRANCH-LUMBAR Left 2016    [facility] OR    rotative cuff      no rotative cuffs on bilat shoulders has pins     SHOULDER SURGERY      shoulder surgery bilat  RIGHT X 4; LEFT X 3    TRANSRECTAL ULTRASOUND GUIDED PROSTATE BIOPSY Bilateral 11/10/2015    Performed by Rud[facility]ring [facility] at [facility] OR    ULTRASOUND-ENDOSCOPIC-UPPER N/A 2017    Performed by Tra [facility] at [facility] ENDO (2ND FLR)    ULTRASOUND-ENDOSCOPIC-UPPER N/A 2016    Performed by Tra Brooks [facility] at [facility] ENDO (2ND FLR)    ULTRASOUND-ENDOSCOPIC-UPPER N/A 2015    Performed by Jason Saleem MD at [facility] ENDO (2ND FLR)     Social History     Tobacco Use    Smoking status: Never Smoker    Smokeless tobacco: Never Used   Substance Use Topics    Alcohol use: No    Drug use: No     Family History   Problem Relation Age of Onset    Heart disease Mother     Sudden death Father     Cancer Father         advanced lung ca- found after 2 story fall    Stroke Sister     Pneumonia Sister          from PNA    Heart disease Sister     Hypertension Brother     Kidney cancer Neg Hx     Prostate cancer Neg Hx     Urolithiasis  "Neg Hx     Allergic rhinitis Neg Hx     Allergies Neg Hx     Angioedema Neg Hx     Asthma Neg Hx     Atopy Neg Hx     Eczema Neg Hx     Immunodeficiency Neg Hx     Rhinitis Neg Hx     Urticaria Neg Hx      Review of patient's allergies indicates:   Allergen Reactions    Ace inhibitors Other (See Comments)     Cough    Arb-angiotensin receptor antagonist Itching    Eplerenone Other (See Comments)     Marked bradycardia, 40, tiredness and weakness      Sulfa (sulfonamide antibiotics) Itching     Patient says this was 10 years ago and doesn't remember what happened     I have reviewed past medical, family, and social history. I have reviewed previous nurse notes.    Performance Status:The patient's activity level is functions out of house.          Review of Systems   Constitutional: Negative for activity change, appetite change, chills, diaphoresis, fever and unexpected weight change, or  fatigue   HENT: Negative for dental problem, postnasal drip, rhinorrhea, sinus pressure, sinus pain, sneezing, sore throat, trouble swallowing and voice change.    Respiratory: Negative for apnea, cough, wheezing and stridor.  Positive for chest tightness, SOB  Cardiovascular: Negative for chest pain, palpitations and leg swelling.   Gastrointestinal: Negative for abdominal distention, abdominal pain, constipation and nausea.   Musculoskeletal: Negative for gait problem, myalgias and neck pain.   Skin: Negative for color change and pallor.   Allergic/Immunologic: Negative for environmental allergies and food allergies.   Neurological: Negative for speech difficulty, weakness, light-headedness, numbness and headaches.  Hematological: Negative for adenopathy. Does not bruise/bleed easily.   Psychiatric/Behavioral: Negative for dysphoric mood and sleep disturbance. The patient is not nervous/anxious.           Exam:Comprehensive exam done. /84 (BP Location: Right arm, Patient Position: Sitting)   Pulse 76   Ht 6' 1" " (1.854 m)   Wt 104.3 kg (229 lb 15 oz)   SpO2 95% Comment: on room air  BMI 30.34 kg/m²   Exam included Vitals as listed  Constitutional: He is oriented to person, place, and time. He appears well-developed. No distress.   Nose: Nose normal.   Mouth/Throat: Uvula is midline, oropharynx is clear and moist and mucous membranes are normal. No dental caries. No oropharyngeal exudate, posterior oropharyngeal edema, posterior oropharyngeal erythema or tonsillar abscesses.  Mallapatti (M) score 3  Eyes: Pupils are equal, round, and reactive to light.   Neck: No JVD present. No thyromegaly present.   Cardiovascular: Normal rate, regular rhythm and normal heart sounds. Exam reveals no gallop and no friction rub.   No murmur heard.  Pulmonary/Chest: Effort normal and breath sounds abnormal: bilateral rhonchi. No accessory muscle usage or stridor. No apnea and no tachypnea. No respiratory distress, decreased breath sounds, wheezes, rhonchi, rales, or tenderness.   Abdominal: Soft. He exhibits no mass. There is no tenderness. No hepatosplenomegaly, hernias and normoactive bowel sounds  Musculoskeletal: Normal range of motion. exhibits no edema.   Lymphadenopathy:     He has no cervical adenopathy.     He has no axillary adenopathy.   Neurological:  alert and oriented to person, place, and time. not disoriented.   Skin: Skin is warm and dry. Capillary refill takes less 2 sec. No cyanosis or erythema. No pallor. Nails show no clubbing.   Psychiatric: normal mood and affect. behavior is normal. Judgment and thought content normal.       Radiographs (ct chest and cxr) reviewed: results reviewed   CT CHEST WITHOUT CONTRAST   Impression   1. Resolving left lower lobe pneumonia.  2. New airspace and ground-glass disease in the right lung which could reflect pneumonia or aspiration.  3. Small bilateral pleural effusions are new.  4. Redemonstrated pleuroparenchymal nodule at the right lung apex.  As discussed previously, additional  follow-up is recommended.  5. Cardiomegaly and coronary artery disease.  6.  Enlarged pulmonary arterial trunk which can be seen in the setting of pulmonary hypertension.  7. Ascending aorta ectasia.  Date: 11/19/2018     XR CHEST 1 VIEW  Impression   Decrease of the left basilar infiltrate compared to the prior exam.  Suggest follow-up study to include lateral view as it is best seen on the lateral view.  Date: 11/08/2018        Labs reviewed       Lab Results   Component Value Date    WBC 6.30 01/29/2019    RBC 3.77 (L) 01/29/2019    HGB 11.5 (L) 01/29/2019    HCT 34.9 (L) 01/29/2019    MCV 93 01/29/2019    MCH 30.5 01/29/2019    MCHC 33.0 01/29/2019    RDW 15.4 (H) 01/29/2019     01/29/2019    MPV 7.1 (L) 01/29/2019    GRAN 4.0 01/29/2019    GRAN 63.2 01/29/2019    LYMPH 1.5 01/29/2019    LYMPH 23.6 01/29/2019    MONO 0.5 01/29/2019    MONO 8.1 01/29/2019    EOS 0.3 01/29/2019    BASO 0.00 01/29/2019    EOSINOPHIL 4.7 01/29/2019    BASOPHIL 0.4 01/29/2019   Results for ARTEM WEI (MRN [id number]) as of 11/21/2018 11:08   Ref. Range 6/29/2018 12:42 11/6/2018 23:40   IgG - Serum Latest Ref Range: 650 - 1600 mg/dL 1298 684   IgM Latest Ref Range: 50 - 300 mg/dL 25 (L)    IgA Latest Ref Range: 40 - 350 mg/dL 233        PFT results reviewed  Pulmonary Functions Testing Results:  PFT results reviewed Pulmonary Functions, including spirometry and bronchodilator response and lung volumes and diffusion, study was done 1/10/18.  Spirometry shows loss of vital capacity and fev1 with no obstruction and no bronchodilator response.   FEV1 is 71% or 2.29 liters.  Lung volumes show  normal TLC.  Diffusion shows reduced but falls within normal range when corrected for lung volumes.     Pulmonary functions show low vital capacity but otherwise normal. MVV is lower than expected and could be from neuromuscular disease?  Clinical correlation recommended.     Mihir Guillen M.D.    Total face-to-face time with the  patient was 40 minutes and greater than 50% was spent in counseling and coordination of care. The above assessment and plan have been discussed at length. Labs and procedure reviewed. Problem List updated. Asked to bring in all active medications / pills bottles with next visit.      Plan:  Clinical impression is resonably certain and repeated evaluation prn +/- follow up will be needed as below.    Micheal was seen today for follow-up, pneumonia, cough and shortness of breath.    Diagnoses and all orders for this visit:    Mild asthma with acute exacerbation, unspecified whether persistent  -     X-Ray Chest PA And Lateral; Future  -     predniSONE (DELTASONE) 20 MG tablet; Take one pill a day for three days, repeat for shortness of breath    Immunoglobulin deficiency  -     pneumoc 13-benita conj-dip cr,PF, (PREVNAR 13, PF,) 0.5 mL Syrg; Inject 0.5 mLs into the muscle once. for 1 dose    Pneumonia of both lungs due to infectious organism, unspecified part of lung  -     Culture, Respiratory with Gram Stain; Future        Follow-up in about 2 weeks (around 2/21/2019), or if symptoms worsen or fail to improve.    Discussed with patient above for education the following:      Patient Instructions   Prednisone 20 mg a day for 3 days for wheeze, cough, and shortness of breath.    Sputum culture, please bring to hospital with in 4 hours of collecting  Only bring in sputum if the color is yellow or green.    Chest x-ray today to evaluate for Pneumonia    Prenair 13 vaccine sent to [facility] pharmacy

## 2019-02-08 ENCOUNTER — TELEPHONE (OUTPATIENT)
Dept: PULMONOLOGY | Facility: CLINIC | Age: 79
End: 2019-02-08

## 2019-02-08 NOTE — TELEPHONE ENCOUNTER
Per Telma Powers NP patient notified.    ----- Message from Telma Powers, NP sent at 2/8/2019  8:44 AM CST -----  Chest x-ray is clear. No sign of pneumonia

## 2019-02-10 ENCOUNTER — PATIENT MESSAGE (OUTPATIENT)
Dept: ADMINISTRATIVE | Facility: OTHER | Age: 79
End: 2019-02-10

## 2019-02-10 ENCOUNTER — OUTPATIENT CASE MANAGEMENT (OUTPATIENT)
Dept: ADMINISTRATIVE | Facility: OTHER | Age: 79
End: 2019-02-10

## 2019-02-10 NOTE — PROGRESS NOTES
02/10/2019  Summary:  (2nd Attempt)  RN-ASPEN attempted to contact patient to complete follow up for OPCM. Left message requesting a return call. Sent attempt letter through patient portal today. If no response to previous voice mail messages or attempt letter by 2/18/19, will close case. - JARVIS Kumar RN-OPCM    Interventions:  - Left message requesting a return call.  - Sent attempt letter via patient portal (3rd Attempt).  - If no response to previous voice mail messages or attempt letter by 2/18/19, will close case.    Plan:  - Assess for receipt of mailed resources. - 12/20/18 Received  - Assess for receipt/completion of Advance Directive/POA paperwork.- 12/20/18 Received, completed, has not brought paperwork in to clinic yet  - Assess for contact with OPCM-LCSW for identified barriers. - SW assessment completed 12/20/18  - Continue to coordinate care with OPCM-LCSW for identified needs.  - Hemodialysis/Hemodialysis Access - Review educational information with patient. - Done 12/20/18, 1/18/19  - Peritoneal Dialysis - Review educational information with patient. - Done 12/20/18, 1/18/19 Deferred, not interested  - Diet for Chronic Renal Disease - Review educational information with patient. - Done 1/18/19  - Gout - Review educational information with patient.  - Hypertension - Review educational information with patient. - Done 1/18/19  - Healthcare Associated Pneumonia - Review educational information with patient.  - Fall Prevention - Review educational information with patient. - Done 12/20/18  - Assess for any additional needs.

## 2019-02-12 ENCOUNTER — OUTPATIENT CASE MANAGEMENT (OUTPATIENT)
Dept: ADMINISTRATIVE | Facility: OTHER | Age: 79
End: 2019-02-12

## 2019-02-12 ENCOUNTER — OFFICE VISIT (OUTPATIENT)
Dept: CARDIOLOGY | Facility: CLINIC | Age: 79
End: 2019-02-12
Payer: MEDICARE

## 2019-02-12 ENCOUNTER — HOSPITAL ENCOUNTER (OUTPATIENT)
Dept: RADIOLOGY | Facility: HOSPITAL | Age: 79
Discharge: HOME | End: 2019-02-12
Attending: NURSE PRACTITIONER
Payer: MEDICARE

## 2019-02-12 VITALS
RESPIRATION RATE: 16 BRPM | BODY MASS INDEX: 30.53 KG/M2 | HEIGHT: 73 IN | OXYGEN SATURATION: 94 % | DIASTOLIC BLOOD PRESSURE: 78 MMHG | SYSTOLIC BLOOD PRESSURE: 142 MMHG | WEIGHT: 230.38 LBS | HEART RATE: 78 BPM

## 2019-02-12 DIAGNOSIS — I51.89 DIASTOLIC DYSFUNCTION: ICD-10-CM

## 2019-02-12 DIAGNOSIS — E65 ABDOMINAL OBESITY: ICD-10-CM

## 2019-02-12 DIAGNOSIS — N18.6 ANEMIA DUE TO CHRONIC KIDNEY DISEASE, ON CHRONIC DIALYSIS: ICD-10-CM

## 2019-02-12 DIAGNOSIS — E78.00 PURE HYPERCHOLESTEROLEMIA: ICD-10-CM

## 2019-02-12 DIAGNOSIS — I10 ESSENTIAL HYPERTENSION: ICD-10-CM

## 2019-02-12 DIAGNOSIS — Z99.2 DIALYSIS PATIENT: ICD-10-CM

## 2019-02-12 DIAGNOSIS — I11.0 HYPERTENSIVE LEFT VENTRICULAR HYPERTROPHY WITH HEART FAILURE: ICD-10-CM

## 2019-02-12 DIAGNOSIS — I51.9 LV DYSFUNCTION: Primary | ICD-10-CM

## 2019-02-12 DIAGNOSIS — E66.9 OBESITY, CLASS I, BMI 30-34.9: Chronic | ICD-10-CM

## 2019-02-12 DIAGNOSIS — D63.1 ANEMIA DUE TO CHRONIC KIDNEY DISEASE, ON CHRONIC DIALYSIS: ICD-10-CM

## 2019-02-12 DIAGNOSIS — I25.10 CORONARY ARTERY DISEASE INVOLVING NATIVE CORONARY ARTERY OF NATIVE HEART WITHOUT ANGINA PECTORIS: ICD-10-CM

## 2019-02-12 DIAGNOSIS — Z99.2 ANEMIA DUE TO CHRONIC KIDNEY DISEASE, ON CHRONIC DIALYSIS: ICD-10-CM

## 2019-02-12 DIAGNOSIS — J18.9 PNEUMONIA OF BOTH LUNGS DUE TO INFECTIOUS ORGANISM, UNSPECIFIED PART OF LUNG: ICD-10-CM

## 2019-02-12 PROBLEM — R00.1 SYMPTOMATIC BRADYCARDIA: Status: RESOLVED | Noted: 2018-08-29 | Resolved: 2019-02-12

## 2019-02-12 PROBLEM — I50.43 ACUTE ON CHRONIC COMBINED SYSTOLIC AND DIASTOLIC HEART FAILURE: Status: RESOLVED | Noted: 2018-11-07 | Resolved: 2019-02-12

## 2019-02-12 PROCEDURE — 99999 PR PBB SHADOW E&M-EST. PATIENT-LVL V: ICD-10-PCS | Mod: PBBFAC,,, | Performed by: INTERNAL MEDICINE

## 2019-02-12 PROCEDURE — 71250 CT THORAX DX C-: CPT | Mod: 26,,, | Performed by: RADIOLOGY

## 2019-02-12 PROCEDURE — 99214 PR OFFICE/OUTPT VISIT, EST, LEVL IV, 30-39 MIN: ICD-10-PCS | Mod: S$PBB,,, | Performed by: INTERNAL MEDICINE

## 2019-02-12 PROCEDURE — 99214 OFFICE O/P EST MOD 30 MIN: CPT | Mod: S$PBB,,, | Performed by: INTERNAL MEDICINE

## 2019-02-12 PROCEDURE — 71250 CT THORAX DX C-: CPT | Mod: TC

## 2019-02-12 PROCEDURE — 99215 OFFICE O/P EST HI 40 MIN: CPT | Mod: PBBFAC,25,PO | Performed by: INTERNAL MEDICINE

## 2019-02-12 PROCEDURE — 99999 PR PBB SHADOW E&M-EST. PATIENT-LVL V: CPT | Mod: PBBFAC,,, | Performed by: INTERNAL MEDICINE

## 2019-02-12 PROCEDURE — 71250 CT CHEST WITHOUT CONTRAST: ICD-10-PCS | Mod: 26,,, | Performed by: RADIOLOGY

## 2019-02-12 NOTE — PROGRESS NOTES
Subjective:    Patient ID:  Micheal Hunt is a 78 y.o. male who presents for  of Follow-up (3 mth f/u )  For post CABG, ESRF now on dialysis, need left wrist operation  PCP: Dr. Lakhani, see biannually  Hematology: Dr. Wells, chronic disease anemia, borderline iron deficiency  Renal: Dr. Rojo, see q 6 weeks  ENT: Dr. Nails, now Dr. Chahal in [place]  GI: Dr. Harris, Dr. Saleem  Physical medicine: Dr. Whitehead  Urology: Dr. Herring  Orthopedic: Dr. Fox in [place], [place], [phone],  Álvarez  Lives alone, daughter, Tonya, on the same ranch, 20 acre, 4 horses, 20 chicken, no  workers, prior commercial contractor, grandson, Uziel  Daughter, Crow, nutritionist in [place] the [facility] supportive of Mediterranean diet, now on plant-based diet, and a Yoga instruction.      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 Significant cardiac history with previous CABG in [place]. Stress test earlier this year was abnormal: Lexiscan -  Nuclear Quantitative Functional Analysis:                                    LVEF: 47 % (normal is 47 - 59)                                               LVED Volume: 195 ml (normal is 91 - 155)                                     LVES Volume: 104 ml (normal is 40 - 78)                                                                                                                   Impression: ABNORMAL MYOCARDIAL PERFUSION                                    1. There is evidence for mild to moderate myocardial ischemia in the         septal wall of the left ventricle, and moderate myocardial ischemia in       the anteroapical wall of the left ventricle with associated stress           induced LV cavity dilatation.                                                2. There is mild intensity fixed defect in the lateral wall of the left      ventricle, consistent with myocardial injury.                                3. There is abnormal wall  motion at rest showing severe hypokinesis of       the septal wall of the left ventricle.                                       4. Resting LV function is normal.  (normal is 47 - 59)                       5. The left ventricular volume is moderately increased at rest.              6. The extracardiac distribution of radioactivity is normal.    Subsequent angiogram, 5/2012:  ANGIOGRAPHIC RESULTS:                                                                                                                                  DIAGNOSTIC:                                                                  Patient has a right dominant coronary artery.                                                                                                             - Left Main Coronary Artery:                                                 The LM is occluded. There is JOURDAN 0 flow.                                                                                                                 - Left Anterior Descending Artery:                                           The LAD has luminal irregularities. There is JOURDAN 3 flow. Fed                from the LIMA graft                                                                                                                                       - Left Circumflex Artery:                                                    The LCX was not found.                                                                                                                                    - Right Coronary Artery:                                                     The RCA has luminal irregularities. There is JOURDAN 3 flow.                    appear to be fed from SVG, could not cannulate, poorly visualized.                                                                                        - Aortic Root:                                                               The Aortic Root is normal. There is  JOURDAN 3 flow.                             Lesion Details: Moderate proximal tortuosity, mild to                        moderate calcification.                                                                                                                                   - HERRON To LAD:                                                               The LIMA to LAD is normal. There is JOURDAN 3 flow.                                                                                                                                                                                       D. SUMMARY:                                                                                                                                               1. Three vessel coronary artery disease.                                     2. Normal LVEF.                                                              3. Mild aortic insufficiency.                                                4. Very difficult study                                                      5. Multiple attempts to cannulate SVG which appears to go to the RCA.        Native RCA appears to have an ostial occlusion.    Was continued on optimal medical management. Could not tolerate atenolol due to severe weakness. Recently without any cardiac complaints. Limited by left knee with soreness and pains. Went to [facility] in [place], [place] and told of the prior implant was too small and not stable. Anticipating re-op by Dr. Fox, a [employer] graduate, in [place]. [place].    Since visit of 10/2/2012, underwent left TKR with good results, had 16 weeks of rehab without much problem. Here today due to hypertension. Home record showed -185/83-99. Noted more dizziness when the pressures are high. Problem is helped by Meclizine. Have decreased exercise following the operation. Diet said to be no salt. No problem with medications,  need 90 days supply.    Since visit of 4/4, concern about his  kidney, see telephone enc on 4/22 with BUN of 28, Scr 1.4, K+ 4.1, and eGFR 50. Old record reviewed, no significant julian in function since 6/2011. Agree now to try HCTZ 12.5 mg every other day along with increase in lisinopril to 80 mg daily. Home BP reviewed 159-189/85-99. No other new problem. Wants comprehensive blood work the next visit.    Since visit of 12/13, left sided CP is gone, appears to be due to straining while carrying a 34 lbs grandson. Discussed cath report and send to [facility]. Home /80. Daughter feels he is stressed, personally do not feel so. Would like to be back in [place] but not family are here. Ill younger sister in [place]. Biking 3 times a week for 15 to 30 minutes twice a day. Do little weights every day.  Lexiscan, 12/19/2013  Nuclear Quantitative Functional Analysis:   LVEF: 42 % (normal is 47 - 59)  LVED Volume: 193 ml (normal is 91 - 155)  LVES Volume: 112 ml (normal is 40 - 78)    Impression: ABNORMAL MYOCARDIAL PERFUSION  1. There is evidence for mild myocardial ischemia in the anterior and lateral apical walls of the left ventricle with associated stress induced LV cavity dilatation.   2. The perfusion scan is free of evidence for myocardial injury.   3. There is abnormal wall motion at rest showing moderate global hypokinesis of the left ventricle.   4. There is resting LV dysfunction with a reduced ejection fraction of 42 %.  (normal is 47 - 59)  5. The left ventricular volume is moderately increased at rest.   6. The extracardiac distribution of radioactivity is normal.   7. When compared to the previous study from 04/12/2012, the LVEF have decreased with global hypokinesia.  8. [facility] SSS =2 =SDS, suggest that 2.5% of myocardium may be ischemic.    ECHO, 12/2013, CONCLUSIONS     1 - Biatrial enlargement.     2 - Enlarged left ventricular enlargement.     3 - Eccentric hypertrophy.     4 - Low normal to mildly depressed left ventricular systolic function (EF 50-55%).      5 - Left ventricular diastolic dysfunction.     6 - Mild mitral regurgitation.     7 - Normal right ventricular systolic function .     8 - Mild to moderate aortic regurgitation.     9 - Some improvement in LV function with reduction in AR from echo on 9/29/2012.  Since visit of 5/2, had to go to [place] for sister, 60 year old with a CVA. Stopped HCTZ for 5 weeks due to traveling. No CP but some return of indigestion, previously helped by Prilosec. Used for about 4 weeks with benefit. Discuss Rx 4-8 weeks treatments are reasonable. Blood work on 6/3 LDL is 113, , with HDL of 33. Cr. Remains stable at 1.4 with K+ 4.1. PSA is elevated to > 4 on finasteride 5 mg for past 10 years, prescribed by Urologist in [place].  CBC is normal.    Since visit of 6/11, did not get the referral to Urology, also had recent fever and chills with lower abdominal pain while in [place] last Thursday to Friday, noted some weakened stream. Also at night can hear strong heart beat and take BP showing , would then take an additional 40 mg of lisinopril on top of 80 mg each AM. Blood work showed first time elevation of Scr to 1.6 without change in BUN and normal K+.     Since visit of 7/11, had problem with spironolactone, took only 2 doses and had severe dizziness for 2 days, also had to adjust timing of medications to avoid dizziness after medications. 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 Sleeping well. Other medications are fine, compliant. Blood work showed slight rise in Scr to 1.5-1.6 from 1.4 after spironolactone. Saw Dr. Herring and given antibiotic. BP at home 140-160/80.    Since visit of 8/15, feeling fine, no problem with medications, home BP similar with systolic of 140-150. 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 No problem note, no limits. Using Tylenol 500 mg BID for OA, helpful. Sleeping well, tried Melatonin and now  "just using warm milk at HS. Blood work reviewed, normal testosterone, uric acid 6.5, BMP with stable Cr of 1.5 with FBS of 103, Lipid panel shows LDL of 78.8 on 80 mg of atorvastatin.     Since visit of 9/17, saw Dr. Calderon for pre-op evaluation and suggested sooner follow up for abnormal stress test. Denies any CP but with occasional chest "congestion" with pleuritic CP with deep breathing, Occurs now and then, sometime related to heavy exertion. Helped with breathing Rx in hospital and albuterol inhaler at home. Last spell about a month ago, lasted for 30 minutes. Had OP left operation on 11/25, no problem. Since home no problem. CXR on 11/6 was normal. Recent labs - LDL 86, HDL 36, normal CMP and CBC.  Serum Cr 1.4, eGFR 49.1, stable. Request nebulizer for home use.    Since visit of 12/10, next morning woke up with high BP, 184/102, felt dizzy, no fall, then a constant heart "hurting", grade 5/10, seems to increase after meal, tried Rolaids and Tums without much help. Pain constant til now. Call answering service at 6 AM and advised to come to office for EKG and evaluation. EKG NSR with frequent APC, rate of 68, LVH with STT abnormalities, no acute change. Discussed atypical presentation of heart pains and also possible GI source of problem. Have not seen any GI specialist for his GERD. Also discussed use of NTG for chest pains. BP at home this AM, 174/100, obviously distressed.    Since visit of 1/9/2014, continue with some back pain over past 6 months, concern possibly due to high dose atorvastatin. Also noted some fatigue with palpitation in the middle of the night, have to get up 3 times for nocturia. No CP but BP elevated in the middle of night to 140/92. Last Holter in 4/2012: PREDOMINANT RHYTHM  1. Sinus rhythm with heart rates varying between 40 and 195 bpm with an average of 77 bpm.     VENTRICULAR ARRHYTHMIAS  1. There were occasional PVCs totalling 290 and averaging 12 per hour.  There were 2 " couplets.    2. There were no episodes of ventricular tachycardia.    SUPRA VENTRICULAR ARRHYTHMIAS  1. There were very frequent PACs totalling 5254 and averaging 228 per hour.  There were 143 couplets.    2. There were no episodes of sustained supraventricular tachycardia.    SINUS NODE FUNCTION  1. There was no evidence of high grade SA nicolás block.     AV CONDUCTION  1. There was no evidence of high grade AV block.     DIARY  1. The diary was not returned    MISCELLANEOUS  1. This was a tape of adequate length (23 hrs).    Also worries about friend in [place] dying without a definite cause. Some worries of kidney and liver due to medications. Last labs in 11/2013 reviewed.     Since visit of 3/17, no new problem, no further CP,   Holter done without symptoms - PREDOMINANT RHYTHM  1. Sinus arrhythmia with heart rates varying between 41 and 164 bpm with an average of 79 bpm.     VENTRICULAR ARRHYTHMIAS  1. There were occasional mostly monomorphic PVCs totalling 272 and averaging 11 per hour.  There was 1 couplet.    2. There were no episodes of ventricular tachycardia.    SUPRA VENTRICULAR ARRHYTHMIAS  1. There were frequent PACs totalling 1748 and averaging 72 per hour.  There were 9 bigeminal cycles.  There were 47 couplets. There were 5 triplets.     2. 1.5% of complexes.    3. There were no episodes of sustained supraventricular tachycardia.    SINUS NODE FUNCTION  1. There was no evidence of high grade SA nicolás block.     AV CONDUCTION  1. There was no evidence of high grade AV block.     2. The longest RR interval was 2110 msec.     DIARY  1. The diary was returned, but not completed    MISCELLANEOUS  1. This was a tape of adequate length (24 hrs).    Labs showed new glucose intolerance, , admit to using lot of sugar recently. CK is elevated with back pain. Last Lipid in 11/2013 LDL-C was 86.4. Sleep evaluation next month.     Since visit of 3/28/2014, stressed due to close sister illnesses with Alzheimer  in [place], stayed there for 8 weeks and recent return with weight gain. No definite muscular pains still with mild chronic low back pain. CP is better, occasional burning.  Home BP reviewed, mostly > 150/80, have occasional HA, every other week. Using HCTZ 25 mg , half tab every other day. Not yet to sleep evaluation. Discussed potential cause of resistant HTN due to untreated GRICELDA.  Labs reviewed CPK remains elevated 362 to 421, FBS improved from 127 to 110. Renal stable with Cr 1.6 to 1.5, K+ 4.1. Lipid LDL-C 69.8. Discussed optional of trying 10 mg of atorvastatin or continuing on 40 mg and be aware of muscle pains / weakness and to monitor CPK.    Since visit of 6/26, no new problem, labs showed slow progressive CKD eGFR now 40, Cr 1.7, BUN 25, . Want to see nephrologist.  Had Sleep study on 6/30 - CONCLUSION:  Nocturnal polysomnography demonstrates:  1.  Obstructive sleep apnea to be present with 44.2 events an hour with    desaturation to 81%.  2.  Sinus rhythm with occasional PVC.     PLAN:  He will return for nasal CPAP titration at a later date.    Since visit of 7/25, OK til 3 weeks ago, started to experience ARMAS, any rushing, similar to prior to CABG. Some CP, more like burning, grade 1/10, last until relax. ECG today SA, rate 56. 1st degree AVB, LVH with ST abnormalities. Called for earlier appointment. No problem with the medications. Just started CPAP this past Sunday, 3 days ago. Last lab again reviewed - K+ 4.1. Had prior problem with spironolactone. Home /95.     Since visit of 9/10, the CP and ARMAS have improved. Could not tolerate eplerenone 25 mg due to marked bradycardia, rate to 40 along with tiredness and weakness, not the expected side effects. Home /79, feeling better except for dry cough, nasal congestion, and bad HA, recurrent problem over the years. Best Rx with short course of Augmentin. Given Doxycycline without benefit. Lab today , BMP with Cr stable at 1.6, K+ 4.0.  Also wants review with GI on GERD, and not able to lose weight.    Since visit of 9/24/2014, no new problem, no problem with medications. GRICELDA reviewed, troubled by being awaken hourly during testing. Denies any fatigue, lots of energy. Labs - stable CKD eGFR 42, , K+ 4.1. Last urine 6/2013. Home BP good at 140/80-85. 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     Since visit of 12/5/2014, had problem with diverticulitis last month now corrected diet, no nuts, more fiber. Denies any CP nor SOB. No sleepiness. Weight up and down. Recent labs A1C 6.2%, LDL-C 59, Hgb 13.3, CMP showed eGFR 39 with Cr 1.7, K+ 3.8.    Since visit of 3/19/2015, no new problem. Had review with Dr. Álvarez, X-ray negative and completed 6 weeks of PT without much benefit. Being closely followed by Dr. Hodgson, recent labs shows eGFR 39, Cr 1.7, have proteinuria, , mild anemia, Hgb 13.4, iron profile is sufficient. Lipid excellent, LDL 58, non-HDL 89.    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 Today discomfort started after 3 zandra, had to stop for 20 minutes, did not use NTG. Similar problem over the last 8 months. Compliant with medications but home BP are high, 130-188/, HR 65-85. ECG shows NSR PAC, LVH, pulmonary pattern. Last lipid in 8/2015 LDL 58.2, non-HDL 90. Active biking twice a week for 30 minutes then farm walk daily.    Since visit of 2/18, continue to have occasional chest burning when rushing fast across pasture or lifting heavy bags. Has about 5 minutes of discomfort, grade 1/10. Also noted some nighttime dry cough, don't believe due to Lisinopril, like nasal congestion with PND. Daughter have Zyrtec, will try. Discussed options for Rx of Abnormal stress test, had difficult LHC in 2012 along with stage 3 CKD. Will proceed with optimize medical Rx first.   ECHO, 3/2016 CONCLUSIONS     1 - Biatrial enlargement.     2 - Enlarged  left ventricular enlargement.     3 - Eccentric hypertrophy.     4 - Low normal to mildly depressed left ventricular systolic function (EF 50-55%).     5 - Mild to moderate aortic regurgitation.     6 - Mild mitral regurgitation.     7 - Left ventricular diastolic dysfunction. E/e'(lat) is 10.  This along with the following abnormalities (ATUL = 49.13 and LVMI = 181.70) suggests significant diastolic dysfunction.     8 - Right ventricular enlargement with mildly depressed systolic function.     9 - The estimated PA systolic pressure is 28 mmHg.     10 - No significant change from Echo on 12/19/2013.     Lexiscan - Nuclear Quantitative Functional Analysis:   LVEF: 34 % (normal is 47 - 59)  LVED Volume: 192 ml (normal is 91 - 155)  LVES Volume: 126 ml (normal is 40 - 78)    Impression: ABNORMAL MYOCARDIAL PERFUSION  1. There is evidence for moderate myocardial ischemia in the inferolateral wall of the left ventricle with associated stress induced LV cavity dilatation with underlying injury present.   2. There is abnormal wall motion at rest showing moderate global hypokinesis of the left ventricle. severe hypokinesis of the inferolateral wall of the left ventricle.   3. There is resting LV dysfunction with a reduced ejection fraction of 34 %.  (normal is 47 - 59)  4. The left ventricular volume is mildly increased at rest.   5. The extracardiac distribution of radioactivity is normal.   6. When compared to the previous study from 12/19/2013, current study indicate inferolateral defects.  7. [employer] SSS=10, SRS=5, and SDS=5, suggest that 7% of myocardium may be ischemic.    Since visit of 3/14, feeling better, wanted no change with medications, long discussion on use of Coreg and need for titration. Also labs reviewed, mild anemia due to CKD, stage 3, eGFR 33, decreased from 40, Dr. Hodgson recommend better cholesterol control. , and non- on 80 mg of Atorvastatin. Want better diet.    Since visit of  "4/25 had problem on 5/2/2016 with sudden onset of vertigo and right ear pain. Had review with Dr. Vasquez and medications changes recommended see telephone encounter note. Now review medications again and vertigo improved after taking Meclizine 4-25 mg tabs daily. Home BP log 127-170/, HR 63-82. Currently back on Coreg 3.125 mg bid. Discuss hope to proceed with Coreg titration with the goal of 25 mg bid.    In 6/2016, had tangled with the trees with review by Dr. Vasquez, right ribs bruised, no fracture, also vertigo with quick turn, fell. Still with some exertional chest burning, average twice a week, but very brief, just seconds. No problem with medications. Had review with Dr. Nails.    In 7/2016, multiple complains, generalized itching started about a week ago, stopped Coreg but itching still there. Also problem with recurrent vertigo exacerbated by head turning or bending, no fall but bothersome, able to do all farm chores. In addition noted to be more tired with the higher dose of Coreg. No SOB nor CP. Recent labs show NICK with Cr 2.2 to 2.5, eGFR down to 24. Will be seeing nephrologist 8/2/2016.    In 12/2016, problem with acute head congestion, frontal, seasonal, usually helped with Augmentin for 10 days. Requesting Rx, option discussed. Feeling "good" in general, able to do work without problem. Ran out of Zetia over a month ago. Lipid test LDL 84.4 on 40 mg of atorvastatin. Home /75-80. Dr. Vasquez had changed ACE-I to Valsartan due to cough.    In 3/2017, find a little tiredness and daytime sleepiness over the last 2 weeks, change to daylight saving made a little worst. 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 Sleep well, 6-8 hours, feel good on awakening. Home /80. Compliant with medications. Some concern of more frequent BM, 4-5 times daily. Eating more fruit. Lipid LDL 62.    In 6/2017, note lot of urine after kidney biopsy about 4 weeks ago. Biopsy reported as OK. Had " "stomach biopsy about a week ago, since then been feeling "yukki", was informed to expect it. Home BP is similar to office reading, Remain active on farm, lot of manual work no problem. No more CP nor ARMAS. ECG today suggest WAP, 64, left axis, LVH, PRWP and high lateral T-wave inversion no change from ECG in 2015. No problem with medications. First cousin age 46 yo,  of massive MI, no symptoms.    In 2017, noted bad itching in both legs for about 2 months, not controlled with topical Rx including steroid. Off Valsartan for 3 days without any changed but BP elevated to 150/90, restarted 4 days ago. Half life of 6 hours: therefore adequate off trial. Also have problem with insomnia, Ambien was helpful before. Home BP this am 140/73. No other problem. Will restart Valsartan first, wants to use up Lisinopril supply, advised to first use up current Valsartan then can retry the Lisinopril, understand side effect of dry cough. Vertigo improved post sinus balloon procedure.  Echo 2017  CONCLUSIONS     1 - Mildly enlarged aortic root.     2 - Biatrial enlargement.     3 - Concentric hypertrophy.     4 - No wall motion abnormalities.     5 - Normal left ventricular systolic function (EF 55-60%).     6 - Mild aortic regurgitation.     7 - Mild mitral regurgitation.     8 - Indeterminate LV diastolic function.     9 - Normal right ventricular systolic function .     10 - The estimated PA systolic pressure is greater than 25 mmHg.     11 - Suggest some improvement in LVEF from Echo in 3/2016.     In 2018, report congestion, sinusitis, bronchitis since , used all kind of medications. Renal review on 1/10 showed significant progression of disease, Cr up to 4.3 from 3.3 just 3 months ago. eGFR down to 12. Home BP log reviewed 137 to 176 / 77 to 90, HR 65 to 79. Been taking an addition half of Valsaran 320 mg when SBP > 175 or BBP > 85, recall doing it for 3 times. No cardiac complaints, no CP nor SOB, been " able to do all the farm work without problem. ECG in 12/2017 - sinus bradycardia, rate 536 left axis, LVH with STT abnormalties.    in 5/2018, here for review, complaints of chronic fatigue but still able to take care of the farm without much problem. Lost 28 lbs with plant-based diet. Compliant with medications. Home /80. LDL in 4/2018 47.6. Dizziness improved over the past 2 days with time adjustment on taking the medications.    in 8/2018, no new problem, noted BP higher at home now >130 to 140 /70. Had brief hospitalization at [facility] in 6/2018 for PNA. On more of a plant diet due to diverticulosis. 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 Labs reviewed LDL 46.4, eGFR 7.1.  Cardiac work up 6/2018  Echo - Conclusion   · Mild-to-moderate regurgitation is present in the aortic valve..  · The left ventricle cavity is mildly dilated.  · Left atrium is moderately dilated.  · Tricuspid valve shows mild regurgitation.  · Left ventricle ejection fraction is mildly decreased at 46%  · Grade I (mild) left ventricular diastolic dysfunction consistent with impaired relaxation.  · LA pressure is normal.  · There is moderate aortic valve stenosis.  · RA cavity is moderately dilated.  · RV systolic function is mildly reduced.  · Normal central venous pressure (3 mm Hg).     Lexiscan Conclusion   · Arrhythmias during stress: rare PACs, rare PVCs.  · There is a left ventricular function defect present in the inferoseptal location(s).  · There is a left ventricular perfusion defect that is small to moderate in size with moderate reduction in uptake present in the apical to basal anterior location(s) that is predominantly fixed.  · There is a left ventricular perfusion defect that is small in size with moderate reduction in uptake present in the inferior location(s) that is predominantly fixed.  · There is a left ventricular function defect present in the inferoseptal location(s).  · There is evidence of  "transient ischemic dilation (TID). TID was appreciated visually and quantitatively.  · No symptoms reported     In 11/2018, post hospitalization, breathing fine, use inhaler as needed. No heart concern.   DCS - "77 yo male with PMHx of CKD stage 5, CAD, HTN, BPH, and HLD.  He was admitted to the service of hospital medicine with HCAP.  He presented to the ED with a one day onset of fever and cough.  Symptoms were associated with fatigue, nausea, wheezing and hemoptysis. He stated that he was recently admitted for a stomach virus about three weeks ago.  He denied chest pain, sob, abdominal pain, vomiting, diarrhea, dysuria and leg swelling.     Hospital Course:   Patient monitored closely during hospitalization. Telemetry ordered. He did have a short run of Vtach and was asymptomatic. Cardiology consulted. He was initiated on IV antibiotics for pneumonia. Pulmonary consulted. He received oral steroids during admission. Nephrology consulted for CKD stage V management. Patient encouraged to initiated HD, however is not open to starting this admission, He wishes to follow up with Dr. Bishop. He was noted to have diastolic HF and received Lasix 60 mg IV with good response. Dyspnea improved. ECHO obtained. Labs monitored. Procalcitonin negative. He is feeling better and is stable for DC from pulmonary, nephrology, and cardiac standpoint."    My note - Telemetry reviewed, have frequent PVC but no NSVT, just artifacts, likely due to motion / coughing.    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 LDL in 7/2018 46.4. No cardiac symptom.  Echo 11/2018  The left ventricle cavity is mildly dilated.  Mild-to-moderate aortic regurgitation.  Left ventricle ejection fraction is low normal at 51%  Left ventricle shows mild concentric hypertrophy.  RV systolic function is normal.  Left atrium is moderately " "dilated.  Moderate aortic valve stenosis.  Aortic valve area is 1.30 cm2; peak velocity is 2.96 m/s; mean gradient is 21.48 mmHg.  Right atrium is mildly dilated.  Mild mitral regurgitation.  Normal central venous pressure (3 mm Hg).  The estimated PA systolic pressure is 27.21 mm Hg  No definite change from Echo in 6/2018    CXR - No definite acute cardiopulmonary disease and no significant change relative to November 8, 2018    Review of Systems      Constitutional: Some chills, no fevers, and sweats and no real fatigue, less head congestion uses mask outdoor, weight gain 15 lbs since 8/2018  Eyes: negative for icterus, redness and visual disturbance  Respiratory: negative for asthma, minimal dry cough with seasonal sinusitis, no dyspnea on exertion, no wheezing, no hemoptysis, pleurisy/chest pain and wheezing, [place] score 0 now 6  Cardiovascular: no further chest pain, chest pressure/discomfort, dyspnea, near-syncope, no further nightly palpitations with less occasional /90, no paroxysmal nocturnal dyspnea and syncope  Gastrointestinal: negative for abdominal pain, nausea and vomiting, no further heart burn on exertion, BM more frequent.  Musculoskeletal:  do have muscle cramp in the left leg post op receiving injection from Dr. Whitehead, now uses Tums as needed, very effective, have arthritis, OA multiple joints.  Neurological: negative for coordination problems, some dizziness after medications, in interval, no gait problems, less headaches, some lightheadedness, likely sinus, no paresthesia, tremors and vertigo, sleeping 8 hours nightly.  Psych: no depression nor anxious, close sister (71 year old) passed in [place] 1/2016.    Objective:    Physical Exam     Constitutional: He appears healthy. No distress.   HENT:   Mouth/Throat: Dentition is normal.   Eyes: Conjunctivae are normal.   Neck: Thyroid normal. Neck supple. Circumference 15.5"  Cardiovascular: Normal rate, regular rhythm and normal " "pulses. PMI is not displaced. Exam reveals no gallop.   Medium-pitched machinery crescendo-decrescendo early systolic murmur is present with a grade of 3/6 at the upper right sternal border, upper left sternal border and apex   Pulses:   Carotid pulses are 2+ on the right side, and 2+ on the left side.   Dorsalis pedis pulses are 2+ on the right side, and 2+ on the left side.   Pulmonary/Chest: Breath sounds normal. He exhibits no tenderness.   Abdominal: Soft, very active bowel sounds. Waist 46" down to 45.75"  Extremities: no pitting edema around the sock line.   Neurological: He is alert and oriented to person, place, and time. Gait normal.   Skin: Skin is warm and dry. No cyanosis. No pallor. Nails show no clubbing.     Assessment:       1. LV dysfunction, EF 50%, reduced to 34%, now 46%    2. Pure hypercholesterolemia    3. Coronary artery disease involving native coronary artery of native heart without angina pectoris    4. Essential hypertension    5. Hypertensive left ventricular hypertrophy with heart failure    6. Anemia due to chronic kidney disease, on chronic dialysis    7. Diastolic dysfunction    8. Obesity, Class I, BMI 32.8 to 34.3, 32.7, today 30.4    9. Dialysis patient, onset 11/2018    10. Abdominal obesity         Plan:   Micheal was seen today for follow-up.    Diagnoses and associated orders for this visit:    LV dysfunction, EF 50%, reduced to 34%, now 46%    Pure hypercholesterolemia    Coronary artery disease involving native coronary artery of native heart without angina pectoris    Essential hypertension    Hypertensive left ventricular hypertrophy with heart failure    Anemia due to chronic kidney disease, on chronic dialysis    Diastolic dysfunction    Obesity, Class I, BMI 32.8 to 34.3, 32.7, today 30.4    Dialysis patient, onset 11/2018    Abdominal obesity    - All medical issue review, continue current Rx.  - CV status stable, continue current Rx, all medications reviewed, patient " acknowledge good understanding.   Instruction for Mediterranean diet and heart healthy exercise given.  - Weigh twice weekly, try to lose 1-2 lbs per week  - Belly exercise daily  - Follow up in 3 months per patient's preference    Chronic fatigue - improved    Benign non-nodular prostatic hyperplasia without lower urinary tract symptoms  -     Change terazosin (HYTRIN) 10 MG capsule; Take 2 capsules (20 mg total) by mouth every evening.  Dispense: 180 capsule; Refill: 3  - Check home blood pressure, 2 days weekly, do 2 readings within 5 minutes in AM and PM, keep log for review.    Proteinuria    ARMAS (dyspnea on exertion) - improved    OA, post left TKR      Patient Active Problem List   Diagnosis    Osteoarthritis of right knee    Nocturia    Hematuria    Carotid stenosis    Essential hypertension    Hyperlipidemia, baseline     CAD (coronary artery disease), 2V CABG 2007    Gout, arthritis    Obesity, Class I, BMI 32.8 to 34.3, 32.7, today 30.4    Dizziness    LVH (left ventricular hypertrophy) due to hypertensive disease    Abnormal cardiovascular stress test    GERD (gastroesophageal reflux disease)    Elevated PSA    Acquired hammer toe    Diastolic dysfunction    Abdominal obesity    Back pain, chronic    Glucose intolerance (impaired glucose tolerance)    Anemia of chronic disease    Gastric nodule    Diverticulitis, 2005, 2015    Personal history of colonic polyps    PSA elevation    DDD (degenerative disc disease), lumbar    LV dysfunction, EF 50%, reduced to 34%, now 46%    Other spondylosis, lumbar region    Knee pain    Benign prostatic hyperplasia without lower urinary tract symptoms    Itching, both legs, onset 7/2017    Chronic sinusitis    Mild persistent asthma without complication    Sigmoid diverticulitis, 3 episodes, 2005, 2015, 2018    Anemia due to chronic kidney disease, on chronic dialysis    Thrombocytopenia    Renal cyst    Unilateral inguinal  hernia    Chronic kidney disease, stage 5    Generalized abdominal pain    Aortic stenosis    Secondary hyperparathyroidism    Erythropoietin (EPO) stimulating agent anemia management patient    Enteritis    CRF (chronic renal failure), stage 5    HCAP (healthcare-associated pneumonia)    Immunoglobulin deficiency    Hyponatremia    Hypocalcemia    Mild malnutrition    Mild asthma with acute exacerbation    Iron deficiency anemia    Pneumonia due to infectious organism    Dialysis patient, onset 11/2018     Total face-to-face time with the patient was 25 minutes and greater than 50% was spent in counseling and coordination of care. The above assessment and plan have been discussed at length. Labs and procedure reviewed. Problem List updated. Asked to bring in all active medications / pills bottles with next visit.

## 2019-02-13 ENCOUNTER — TELEPHONE (OUTPATIENT)
Dept: PULMONOLOGY | Facility: CLINIC | Age: 79
End: 2019-02-13

## 2019-02-13 NOTE — TELEPHONE ENCOUNTER
Notified patient of results, patient verbalized understanding. No further action is needed at this time.----- Message from Telma Powers NP sent at 2/12/2019  4:38 PM CST -----  CT does not look bad, will go over at next appointment.

## 2019-02-19 ENCOUNTER — OFFICE VISIT (OUTPATIENT)
Dept: HEMATOLOGY/ONCOLOGY | Facility: CLINIC | Age: 79
End: 2019-02-19
Payer: MEDICARE

## 2019-02-19 VITALS
HEIGHT: 78 IN | WEIGHT: 232.56 LBS | DIASTOLIC BLOOD PRESSURE: 83 MMHG | BODY MASS INDEX: 26.91 KG/M2 | TEMPERATURE: 98 F | SYSTOLIC BLOOD PRESSURE: 140 MMHG | HEART RATE: 71 BPM | RESPIRATION RATE: 24 BRPM

## 2019-02-19 DIAGNOSIS — N18.6 ANEMIA DUE TO CHRONIC KIDNEY DISEASE, ON CHRONIC DIALYSIS: Primary | ICD-10-CM

## 2019-02-19 DIAGNOSIS — Z99.2 ANEMIA DUE TO CHRONIC KIDNEY DISEASE, ON CHRONIC DIALYSIS: Primary | ICD-10-CM

## 2019-02-19 DIAGNOSIS — N40.1 BPH ASSOCIATED WITH NOCTURIA: ICD-10-CM

## 2019-02-19 DIAGNOSIS — R35.1 BPH ASSOCIATED WITH NOCTURIA: ICD-10-CM

## 2019-02-19 DIAGNOSIS — Z79.899 ERYTHROPOIETIN (EPO) STIMULATING AGENT ANEMIA MANAGEMENT PATIENT: ICD-10-CM

## 2019-02-19 DIAGNOSIS — I11.0 HYPERTENSIVE LEFT VENTRICULAR HYPERTROPHY WITH HEART FAILURE: ICD-10-CM

## 2019-02-19 DIAGNOSIS — K21.00 GASTROESOPHAGEAL REFLUX DISEASE WITH ESOPHAGITIS: ICD-10-CM

## 2019-02-19 DIAGNOSIS — Z86.39 HISTORY OF IRON DEFICIENCY: ICD-10-CM

## 2019-02-19 DIAGNOSIS — M25.532 LEFT WRIST PAIN: ICD-10-CM

## 2019-02-19 DIAGNOSIS — D63.1 ANEMIA DUE TO CHRONIC KIDNEY DISEASE, ON CHRONIC DIALYSIS: Primary | ICD-10-CM

## 2019-02-19 DIAGNOSIS — D64.9 ERYTHROPOIETIN (EPO) STIMULATING AGENT ANEMIA MANAGEMENT PATIENT: ICD-10-CM

## 2019-02-19 PROBLEM — D80.9 IMMUNOGLOBULIN DEFICIENCY: Status: RESOLVED | Noted: 2018-11-06 | Resolved: 2019-02-19

## 2019-02-19 PROBLEM — D69.6 THROMBOCYTOPENIA: Status: RESOLVED | Noted: 2018-02-25 | Resolved: 2019-02-19

## 2019-02-19 PROCEDURE — 99214 OFFICE O/P EST MOD 30 MIN: CPT | Mod: S$PBB,,, | Performed by: INTERNAL MEDICINE

## 2019-02-19 PROCEDURE — 99214 PR OFFICE/OUTPT VISIT, EST, LEVL IV, 30-39 MIN: ICD-10-PCS | Mod: S$PBB,,, | Performed by: INTERNAL MEDICINE

## 2019-02-19 PROCEDURE — 99999 PR PBB SHADOW E&M-EST. PATIENT-LVL III: ICD-10-PCS | Mod: PBBFAC,,, | Performed by: INTERNAL MEDICINE

## 2019-02-19 PROCEDURE — 99213 OFFICE O/P EST LOW 20 MIN: CPT | Mod: PBBFAC,PO | Performed by: INTERNAL MEDICINE

## 2019-02-19 PROCEDURE — 99999 PR PBB SHADOW E&M-EST. PATIENT-LVL III: CPT | Mod: PBBFAC,,, | Performed by: INTERNAL MEDICINE

## 2019-02-19 NOTE — PROGRESS NOTES
CC : My wrist is hurting   Micheal Hunt is a 78 y.o.   Pt tolerating dialysis :  seeing a doctor in [facility]   Pt is here for evaluation of anemia with CKD.  He has received  procrit in the past  His labs reveal continued anemia. Past hemoglobin  Has been less than 10        tear in left wrist remains  pain and tender  He has not resumed amlodipine due to a drop in pressure with dialysis    AV graft in left arm : He  Has started dialysis   [facility] pneumonia PET CT recently revealed resolution of such     No further SOB       Past Medical History:   Diagnosis Date    NICK (acute kidney injury) 7/29/2016    Allergy     Anemia, mild 12/15/2014    Arthritis     Gout    Benign essential HTN 3/27/2012    BMI 29.0-29.9,adult 5/10/2018    BPH (benign prostatic hyperplasia)     BPH (benign prostatic hyperplasia)     CAD (coronary artery disease) 2006    Chronic kidney disease     due to ibuprofen    Colon polyp     CRF (chronic renal failure), stage 5     Diverticulosis     Gastritis     GERD (gastroesophageal reflux disease)     Gout     History of colon polyps 5/3/2018    HTN (hypertension) 3/27/2012    Hyperlipidemia     Hyperlipidemia     LLL pneumonia 6/14/2018    LVH (left ventricular hypertrophy)     Mesenteric ischemia     Murmur, cardiac 3/27/2012    GRICELDA (obstructive sleep apnea)     DOES NOT USE A MACHINE    Sinus problem     Syncope and collapse      He is tolerating cozaar for HTN  He is tolerating proscar for BPH     Current Outpatient Medications:     albuterol (PROVENTIL/VENTOLIN HFA) 90 mcg/actuation inhaler, 2 puffs every 4 hours as needed for cough, wheeze, or shortness of breath, Disp: 3 Inhaler, Rfl: 3    albuterol-ipratropium (DUO-NEB) 2.5 mg-0.5 mg/3 mL nebulizer solution, INHALE 1 VIAL BY NEBULIZER EVERY 6 HOURS AS NEEDED FOR WHEEZING, Disp: 270 mL, Rfl: 0    allopurinol (ZYLOPRIM) 100 MG tablet, TAKE 1 TABLET BY MOUTH EVERY DAY, Disp: 90 tablet, Rfl:  1    aspirin (ECOTRIN) 81 MG EC tablet, Take 81 mg by mouth once daily.  , Disp: , Rfl:     atorvastatin (LIPITOR) 80 MG tablet, TAKE 1 TABLET (80 MG TOTAL) BY MOUTH ONCE DAILY., Disp: 90 tablet, Rfl: 3    budesonide-formoterol 160-4.5 mcg (SYMBICORT) 160-4.5 mcg/actuation HFAA, Inhale 2 puffs into the lungs every 12 (twelve) hours. Controller, Disp: 3 Inhaler, Rfl: 4    carvedilol (COREG) 6.25 MG tablet, Take 1 tablet (6.25 mg total) by mouth 2 (two) times daily with meals., Disp: 60 tablet, Rfl: 3    finasteride (PROSCAR) 5 mg tablet, TAKE 1 TABLET ONCE DAILY., Disp: 90 tablet, Rfl: 3    fluticasone (FLONASE) 50 mcg/actuation nasal spray, 2 sprays (100 mcg total) by Each Nare route once daily., Disp: 3 Bottle, Rfl: 3    gabapentin (NEURONTIN) 300 MG capsule, Take 1 capsule (300 mg total) by mouth every evening., Disp: 90 capsule, Rfl: 3    isosorbide mononitrate (ISMO,MONOKET) 10 mg tablet, TAKE 1 TABLET BY MOUTH EVERY DAY IN THE EVENING, Disp: 30 tablet, Rfl: 3    levoFLOXacin (LEVAQUIN) 500 MG tablet, Take 1 tablet (500 mg total) by mouth once daily., Disp: 7 tablet, Rfl: 0    losartan (COZAAR) 100 MG tablet, TAKE 1 TABLET BY MOUTH EVERY DAY, Disp: 90 tablet, Rfl: 0    meclizine (ANTIVERT) 25 mg tablet, TAKE 1 TABLET BY MOUTH THREE TIMES A DAY AS NEEDED, Disp: 90 tablet, Rfl: 0    mirtazapine (REMERON) 7.5 MG Tab, TAKE 1 TABLET BY MOUTH EVERY EVENING., Disp: 30 tablet, Rfl: 2    montelukast (SINGULAIR) 10 mg tablet, Take 1 tablet (10 mg total) by mouth every evening., Disp: 90 tablet, Rfl: 1    NITROSTAT 0.4 mg SL tablet, PLACE 1 TABLET (0.4 MG TOTAL) UNDER THE TONGUE EVERY 5 (FIVE) MINUTES AS NEEDED FOR CHEST PAIN., Disp: 25 tablet, Rfl: 3    omeprazole (PRILOSEC) 20 MG capsule, TAKE 1 CAPSULE BY MOUTH EVERY DAY, Disp: 30 capsule, Rfl: 1    predniSONE (DELTASONE) 20 MG tablet, Take one pill a day for three days, repeat for shortness of breath, Disp: 9 tablet, Rfl: 2    sodium chloride (SALINE  "NASAL MIST) 3 % Mist, 1 spray by Nasal route 2 (two) times daily., Disp: , Rfl:     tamsulosin (FLOMAX) 0.4 mg Cap, TAKE 1 CAPSULE BY MOUTH EVERY DAY, Disp: 30 capsule, Rfl: 2    torsemide (DEMADEX) 20 MG Tab, Take 1 tablet (20 mg total) by mouth once daily., Disp: 30 tablet, Rfl: 5    traMADol (ULTRAM) 50 mg tablet, Take 1 tablet (50 mg total) by mouth every 12 (twelve) hours as needed for Pain., Disp: 60 tablet, Rfl: 2    zolpidem (AMBIEN) 5 MG Tab, TAKE 1 TABLET BY MOUTH EVERY EVENING AS NEEDED, Disp: 30 tablet, Rfl: 3    zolpidem (AMBIEN) 5 MG Tab, TAKE 1 TABLET BY MOUTH EVERY EVENING AS NEEDED, Disp: 30 tablet, Rfl: 3       CONSTITUTIONAL: No fevers, chills, night sweats, Improving  fatigue   SKIN: no rashes or itching  ENT: No headaches, head trauma, vision changes, or eye pain  LYMPH NODES: None noticed   CV: No chest pain, palpitations.   RESP:INCREASING   dyspnea on exertion,NEW  cough, wheezing, or hemoptysis  GI: No nausea, emesis, diarrhea, constipation, melena, hematochezia, pain.   : No dysuria, hematuria, urgency, or frequency   HEME: No easy bruising, bleeding problems  PSYCHIATRIC: No depression, anxiety, hallucinations.  NEURO: No seizures, memory loss, no weakness  No difficulty sleeping  MSK:++ left wrist and knees with arthralgias + left wrist joint swelling  Increasing sedation   Depression screening negative        BP (!) 140/83   Pulse 71   Temp 98.2 °F (36.8 °C)   Resp (!) 24   Ht 6' 11" (2.108 m)   Wt 105.5 kg (232 lb 9.4 oz)   BMI 23.74 kg/m²   Constitutional: oriented to person, place, and time.    HENT:   Head: No periorbital edema , no sinus tenderness  Right Ear: External ear normal.   Left Ear: External ear normal.   Nose: Nose normal.   Mouth/Throat: Oropharynx is clear and moist. No oral ulcerations  Eyes: Conjunctivae and EOM are normal. Pupils are equal, round  Neck: Normal range of motion.No thyromegaly present.   Cardiovascular: Normal rate, regular rhythm, normal " heart sounds and intact distal pulses.    ++ murmur heard.  Pulmonary/Chest: Clear bilaterally   Abdominal: Soft. Bowel sounds are normal.  no mass.  Musculoskeletal:   no edema  + limited ROM left wrist  Remains and tenderness.  + swollen wrist   Lymphadenopathy:  no cervical adenopathy.   Neurological: alert and oriented to person, place, and time.   Psychiatric: normal mood and affect.   behavior is normal.   Vitals reviewed.    Lab Results   Component Value Date    WBC 6.30 01/29/2019    HGB 11.5 (L) 01/29/2019    HCT 34.9 (L) 01/29/2019    MCV 93 01/29/2019     01/29/2019     IGG normal  ssa normal  ssb normal  DNA ds normal        Narrative     EXAMINATION:  CT CHEST WITHOUT CONTRAST    CLINICAL HISTORY:  Abnormal CT, hemoptysis; Abnormal findings on diagnostic imaging of other specified body structures    TECHNIQUE:  Low dose axial images, sagittal and coronal reformations were obtained from the thoracic inlet to the lung bases. Contrast was not administered.    COMPARISON:  11/05/2018    FINDINGS:  Respiratory motion artifact in the lungs limits fine detail.  Minimal airspace opacity medial basal left lower lobe.  Small new airspace opacity medial basal right lower lobe.  1.9 cm pleuroparenchymal opacity persists in the right lung apex as seen series 601, image 42.  Just adjacent to this are several ground-glass nodular opacities which are new, largest measuring 12 mm.  Bilateral pleural fluid.  Cardiomegaly with coronary artery disease.  Ascending aorta 4.8 cm.  Prominent pulmonary artery trunk.  Redemonstrated enlarged precarinal lymph node 1.2 cm series 2, image 23. Partially imaged bilateral renal cystic lesions.  Sternal wires.  Diffuse idiopathic skeletal hyperostosis.  Surgical anchor right humeral head.      Impression       1. Resolving left lower lobe pneumonia.  2. New airspace and ground-glass disease in the right lung which could reflect pneumonia or aspiration.  3. Small bilateral  pleural effusions are new.  4. Redemonstrated pleuroparenchymal nodule at the right lung apex.  As discussed previously, additional follow-up is recommended.  5. Cardiomegaly and coronary artery disease.  6.  Enlarged pulmonary arterial trunk which can be seen in the setting of pulmonary hypertension.  7. Ascending aorta ectasia.      Electronically signed by: Sean Avendaño  Date: 11/19/2018  Time: 12:39                    NM PET CT Routine Skull to Mid Thigh   Order: [id number]   Status:  Final result   Visible to patient:  Yes (Patient Portal) Next appt:  02/21/2019 at 09:30 AM in Pulmonology (Telma Powers NP) Dx:  Unexplained weight gain; Cough; Rales...   Details     Reading Physician Reading Date Result Priority   Marlee Smith MD 1/31/2019       Narrative     INTEGRATED PET CT WITH IMAGE FUSION    HISTORY:  Right lung nodule initial treatment evaluation    TECHNIQUE: Following the injection of 12.5 mCi of F-18 labeled FDG into a right  antecubital vein, PET CT was performed from the vertex of the skull through the  proximal thighs with an integrated full ring PET CT scanner with image fusion.  The patient's serum glucose at the time of the exam was 96 mg/dL.    Outside chest CT dated 11/19/2018    FINDINGS: There is resolution of the pleural parenchymal opacity in the right  apex as well as the surrounding groundglass opacities. Findings most consistent  with infectious or inflammatory process. There is resolution of the small  pleural effusions and bibasilar atelectasis. There are no pulmonary nodules,  new infiltrates or pleural effusions.    The patient has had a prior CABG. The heart is enlarged. There is moderate  coronary artery calcification. There is no hilar or mediastinal adenopathy.    There are degenerative changes of the right sternoclavicular joint with  subchondral sclerosis and erosions. There is FDG activity of 4.7. There are no  lytic or blastic lesions.    There are degenerative  changes of both shoulders and the spine.    CT abdomen and pelvis demonstrates physiologic activity within the GI tract and  urinary system. There is moderate vascular calcification. The adrenal glands  are normal. The patient has had a cholecystectomy.    There is a 3 cm right renal cyst. There is bilateral renal cortical atrophy.  There is colonic diverticulosis without diverticulitis. There is no adenopathy.  The prostate gland is enlarged.    CT of the head and neck show no intra-axial lesions or cervical adenopathy.    IMPRESSION: Resolution of the nodule in the right upper lobe with associated  groundglass opacities compatible with infectious or inflammatory process    Resolution of the small pleural effusions and bibasilar airspace disease    Cardiomegaly    Prior CABG with diffuse vascular calcification    Prostatic hypertrophy    Moderate degenerative changes of the right sternoclavicular joint with mild FDG  activity    Diffuse degenerative changes of the spine and shoulders    Read and electronically signed by: Marlee Melton MD on 1/31/2019 1:44 PM CST      MARLEE MELTON MD                 Anemia due to chronic kidney disease, on chronic dialysis  -     CBC auto differential; Standing  -     Iron and TIBC; Future; Expected date: 02/19/2019    Gastroesophageal reflux disease with esophagitis    Erythropoietin (EPO) stimulating agent anemia management patient    Hypertensive left ventricular hypertrophy with heart failure    Left wrist pain    BPH associated with nocturia    History of iron deficiency  -     CBC auto differential; Standing  -     Iron and TIBC; Future; Expected date: 02/19/2019          No refills on meds needed  No need for procrit  Anemia stable   Now on dialysis  Tolerating well   No need for blood at this time   Hemoglobin improving   Cont cozaar for HTN and proscar for BPH  To urology for F/U   Discussed BMI : could decrease weight a little : ten pound weight loss would  help  Low risk for thrombotic event : He is staying active   rTC 6 weeks with labs and iron to assess the need for IV iron or procrit     Thank you for allowing me to evaluate and participate in the care of this pleasant patient. Please fell free to call me with any questions or concerns.    Vipinly,  Mikala Wells MD    This note was dictated with Dragon and briefly proofread. Please excuse any sentences that may be unclear or words misspelled

## 2019-02-20 ENCOUNTER — TELEPHONE (OUTPATIENT)
Dept: PULMONOLOGY | Facility: CLINIC | Age: 79
End: 2019-02-20

## 2019-02-20 ENCOUNTER — TELEPHONE (OUTPATIENT)
Dept: FAMILY MEDICINE | Facility: CLINIC | Age: 79
End: 2019-02-20

## 2019-02-20 NOTE — TELEPHONE ENCOUNTER
Pt wanted to reschedule appt for tomorrow then changed his mind. Stated he will keep his appt tomorrow. Stated his prednisone course is finished and he is still coughing. NP notified.  ----- Message from Georgina Squires sent at 2/20/2019  9:27 AM CST -----  Contact: Patient  Type: Needs Medical Advice    Who Called:  Patient  Best Call Back Number:   Additional Information: Calling in regards to his appt on Thursday, 2/21/19. Call to pod. no answer. Please advise.

## 2019-02-20 NOTE — TELEPHONE ENCOUNTER
See previous note.   ----- Message from Darlene Benson sent at 2/20/2019  9:43 AM CST -----  Type:  Patient Returning Call    Who Called:  Self   Who Left Message for Patient:    Does the patient know what this is regarding?: NA  Best Call Back Number:  [phone]  Additional Information:  Patient states he was on the phone with the nurse, call was disconnected.

## 2019-02-20 NOTE — TELEPHONE ENCOUNTER
----- Message from Lambert Dimas sent at 2/20/2019  9:24 AM CST -----  Type:  Patient Call Back    Who Called:pt     Does the patient know what this is regarding?: pt cannot see the office on 04/03/19; pt is only available tues or thurs and needs to be seen sooner than next available date.  Best Call Back Number:  [phone]  Additional Information:  Please call pt and leave a detailed message if there is no answer.

## 2019-02-21 ENCOUNTER — OFFICE VISIT (OUTPATIENT)
Dept: PULMONOLOGY | Facility: CLINIC | Age: 79
End: 2019-02-21
Payer: MEDICARE

## 2019-02-21 VITALS
WEIGHT: 237.63 LBS | DIASTOLIC BLOOD PRESSURE: 79 MMHG | HEART RATE: 62 BPM | BODY MASS INDEX: 33.27 KG/M2 | HEIGHT: 71 IN | SYSTOLIC BLOOD PRESSURE: 134 MMHG | OXYGEN SATURATION: 98 %

## 2019-02-21 DIAGNOSIS — J45.991 COUGH VARIANT ASTHMA: ICD-10-CM

## 2019-02-21 DIAGNOSIS — J45.901 MILD ASTHMA WITH ACUTE EXACERBATION, UNSPECIFIED WHETHER PERSISTENT: Primary | ICD-10-CM

## 2019-02-21 PROCEDURE — 96372 THER/PROPH/DIAG INJ SC/IM: CPT | Mod: PBBFAC,PO

## 2019-02-21 PROCEDURE — 99999 PR PBB SHADOW E&M-EST. PATIENT-LVL III: ICD-10-PCS | Mod: PBBFAC,,, | Performed by: NURSE PRACTITIONER

## 2019-02-21 PROCEDURE — 99214 OFFICE O/P EST MOD 30 MIN: CPT | Mod: S$PBB,,, | Performed by: NURSE PRACTITIONER

## 2019-02-21 PROCEDURE — 99213 OFFICE O/P EST LOW 20 MIN: CPT | Mod: PBBFAC,PO,25 | Performed by: NURSE PRACTITIONER

## 2019-02-21 PROCEDURE — 99999 PR PBB SHADOW E&M-EST. PATIENT-LVL III: CPT | Mod: PBBFAC,,, | Performed by: NURSE PRACTITIONER

## 2019-02-21 PROCEDURE — 99214 PR OFFICE/OUTPT VISIT, EST, LEVL IV, 30-39 MIN: ICD-10-PCS | Mod: S$PBB,,, | Performed by: NURSE PRACTITIONER

## 2019-02-21 RX ORDER — AZITHROMYCIN 500 MG/1
500 TABLET, FILM COATED ORAL DAILY
Qty: 3 TABLET | Refills: 2 | Status: ON HOLD | OUTPATIENT
Start: 2019-02-21 | End: 2019-03-14 | Stop reason: HOSPADM

## 2019-02-21 RX ORDER — BETAMETHASONE SODIUM PHOSPHATE AND BETAMETHASONE ACETATE 3; 3 MG/ML; MG/ML
12 INJECTION, SUSPENSION INTRA-ARTICULAR; INTRALESIONAL; INTRAMUSCULAR; SOFT TISSUE ONCE
Status: COMPLETED | OUTPATIENT
Start: 2019-02-21 | End: 2019-02-21

## 2019-02-21 RX ORDER — PREDNISONE 20 MG/1
TABLET ORAL
Qty: 9 TABLET | Refills: 2 | Status: ON HOLD | OUTPATIENT
Start: 2019-02-21 | End: 2019-03-14 | Stop reason: HOSPADM

## 2019-02-21 RX ADMIN — BETAMETHASONE ACETATE AND BETAMETHASONE SODIUM PHOSPHATE 12 MG: 3; 3 INJECTION, SUSPENSION INTRA-ARTICULAR; INTRALESIONAL; INTRAMUSCULAR; SOFT TISSUE at 10:02

## 2019-02-21 NOTE — PROGRESS NOTES
2/21/2019    Micheal Hunt  Office Note    Chief Complaint   Patient presents with    Follow-up     2 weeks, feels a little worse    Sputum Production     clear    Cough     worse at night, has coughing spells    Asthma       HPI:   2/21/2019- cough unchanged no improvement. Took prednisone taper helped for few days but cough persists, worse at night, severe.  Productive white in color. Albuterol inhaler 1x daily mostly in evenings.   2-3 nocturnal arousals nightly,     02/07/2019- Cough: onset 1 week, worse at night, productive white in color. SOB with exertion, relieved with rest, and albuterol.  No nocturnal arousals, Albuterol rescue inhaler 1x daily, taking symbicort daily.      11/21/18 Admitted [facility] for Pneumonia discharged Admit Date: 11/5/2018-11/09/2018.   SOB improved since discharge, SOB onset few months Summer time, SOB and chest tightness with exertion walking, relieved with rest. Not currently taking symbicort inhaler or albuterol inhaler.   Currently on Cipro half dose due ESRD, on dialysis.      Previous HPI Dr. Guillen: 2/21/18- from [place] , worked [employer], lives [place] , horses/ranching.  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     Has yr round sinus problems - drainage ,  Clear to green,  abx help but avoids with renal dz.  DR Nails did sinus work - saw Dr Bartlett - penuomococcal titers were low, no vaccine     Lots of cough and h/o wheezes, not sob.  Has lung burning off and on  - burns when exerts - releif with rest.  Duration 5-10 min but stops with rest.     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      pft with low MVV, denies sob now nor muscle weakness.     Bell Wilkinson NP   HPI:   Micheal Hunt is a 79 yo male with PMHx of CKD stage 5, CAD, HTN, BPH, and HLD.  He was admitted to the service of hospital medicine with HCAP.  He presented to the ED with a one day onset of fever and cough.  Symptoms were associated with  fatigue, nausea, wheezing and hemoptysis. He stated that he was recently admitted for a stomach virus about three weeks ago.  He denied chest pain, sob, abdominal pain, vomiting, diarrhea, dysuria and leg swelling.     * No surgery found *       Hospital Course:   Patient monitored closely during hospitalization. Telemetry ordered. He did have a short run of Vtach and was asymptomatic. Cardiology consulted. He was initiated on IV antibiotics for pneumonia. Pulmonary consulted. He received oral steroids during admission. Nephrology consulted for CKD stage V management. Patient encouraged to initiated HD, however is not open to starting this admission, He wishes to follow up with Dr. Bishop. He was noted to have diastolic HF and received Lasix 60 mg IV with good response. Dyspnea improved. ECHO obtained. Labs monitored. Procalcitonin negative. He is feeling better and is stable for DC from pulmonary, nephrology, and cardiac standpoint.      PE; chest coarse. Heart RRR with Murmum LUE fistula      Mihir Guillen MD Physician Pulmonology   Nov 7, sl wheezes, feels better, ask for tramadol tid (renal failure max dose 200/d),  Follows dr Bishop,    Nov 8- feels better but had small amount brb hemoptysis with some chest pain this am. Smaller amount than 2 prior episodes.  Nov 9, says chest discomfort (denied chest pain when I initially saw pt) and cough are resolved.  Feels well now.     Plan:   Nov 7, sl wheezes, feels better, ask for tramadol tid (renal failure max dose 200/d),  Follows dr Bishop,  Pt ambulating and seems to be doing well.     Proteinuria noted, non gap acidosis likely renal- bicarb reasonable.  There is no pulmonary artery aneursym.       Pt was on oral cipro sice 10/ bid with renal failure and apparently  Failed. There are no blood cultures resulted on epic at this time?  No sputum submitted?   Order bicarb and dose prednisone x2.       Best to monitor another day as failed outpt abx?         Addendum- pt says did not take cipro for fear side effects.        Nov 8- ct reviewed as was today's cxr.  Pt is being rx for abx after presenting with fever and chill and hemoptysis - ct had airways open and lll infiltrate.  Pt had some brb hemoptysis today.     cta would be a problem with renal status. Will check d dimer and u/s legs and monitor.  Airways ok on ct -  Dx not clear.  Pt is on low dose steroid.  There is no concern re pulm art aneurysm.     Nov 9,  Sputum from yesterday pending this am.  Pt did not use cipro pta- was ordered but pt didn't take.  Pt did have chill, fever, hemoptysis at admit with ? vague slight recurrence yesterday?  procalcitonin was 0.18 admit, below 0.10 would suggest not pneumonia.  No cta due to renal failure.       If no dvt on leg u/s this am--outpt on abx reasonable.  Will f/u in office with ct to assure left lower lung abn clears.  Working dx is pneumonia- has some atypical features.       Electronically signed by Mihir Guillen MD at 11/9/2018       The chief compliant  problem is worsening  PFSH:  Past Medical History:   Diagnosis Date    NICK (acute kidney injury) 7/29/2016    Allergy     Anemia, mild 12/15/2014    Arthritis     Gout    Benign essential HTN 3/27/2012    BMI 29.0-29.9,adult 5/10/2018    BPH (benign prostatic hyperplasia)     BPH (benign prostatic hyperplasia)     CAD (coronary artery disease) 2006    Chronic kidney disease     due to ibuprofen    Colon polyp     CRF (chronic renal failure), stage 5     Diverticulosis     Gastritis     GERD (gastroesophageal reflux disease)     Gout     History of colon polyps 5/3/2018    HTN (hypertension) 3/27/2012    Hyperlipidemia     Hyperlipidemia     LLL pneumonia 6/14/2018    LVH (left ventricular hypertrophy)     Mesenteric ischemia     Murmur, cardiac 3/27/2012    GRICELDA (obstructive sleep apnea)     DOES NOT USE A MACHINE    Sinus problem     Syncope and collapse          Past Surgical  History:   Procedure Laterality Date    APPENDECTOMY      BLOCK-NERVE Left 5/31/2016    [facility] at [facility]    CARDIAC CATHETERIZATION      COLONOSCOPY  2011    COLONOSCOPY N/A 5/3/2018    [facility]    COLONOSCOPY N/A 9/10/2015    [facility]    CORONARY ARTERY BYPASS GRAFT  4/2007    x 1    CYSTOSCOPY N/A 8/30/2017    [facility] at [facility] OR    CYSTOSCOPY N/A 11/10/2015    [facility]    ESOPHAGOGASTRODUODENOSCOPY (EGD) N/A 1/4/2016    [facility]formed [facility] Tra [facility] at [facility] (2ND F[facility])    ESOPHAGOGASTRODUODENOSCOPY (EGD) N/A 10/15/2014    [facility] at [facility] ENDO    INJECTION-STEROID-EPIDURAL-TRANSFORAMINAL Left 2/25/2016    [facility] at [facility] OR    JOINT REPLACEMENT      left knee total replacement  X 3    mid leftt finger      from a cactuss    MOLE REMOVAL  2016    RADIOFREQUENCY THERMOCOAGULATION (RFTC)-NERVE-MEDIAN BRANCH-LUMBAR Left 4/11/2016    [facility] at [facility] OR    rotative cuff      no rotative cuffs on bilat shoulders has pins     SHOULDER SURGERY      shoulder surgery bilat  RIGHT X 4; LEFT X 3    TRANSRECTAL ULTRASOUND GUIDED PROSTATE BIOPSY Bilateral 11/10/2015    [facility] OR    ULTRASOUND-ENDOSCOPIC-UPPER N/A 5/31/2017    [facility] at [facility] ENDO (2ND FLR)    ULTRASOUND-ENDOSCOPIC-UPPER N/A 1/4/2016    Performed by Tra [facility] at [facility] ENDO (2ND FLR)    ULTRASOUND-ENDOSCOPIC-UPPER N/A 1/16/2015    Performed by Jason Saleem MD at [facility] (2ND FLR)     Social History     Tobacco Use    Smoking status: Never Smoker    Smokeless tobacco: Never Used   Substance Use Topics    Alcohol use: No    Drug use: No     Family History   Problem Relation Age of Onset    Heart disease Mother     Sudden death Father      Cancer Father         advanced lung ca- found after 2 story fall    Stroke Sister     Pneumonia Sister          from PNA    Heart disease Sister     Hypertension Brother     Kidney cancer Neg Hx     Prostate cancer Neg Hx     Urolithiasis Neg Hx     Allergic rhinitis Neg Hx     Allergies Neg Hx     Angioedema Neg Hx     Asthma Neg Hx     Atopy Neg Hx     Eczema Neg Hx     Immunodeficiency Neg Hx     Rhinitis Neg Hx     Urticaria Neg Hx      Review of patient's allergies indicates:   Allergen Reactions    Ace inhibitors Other (See Comments)     Cough    Arb-angiotensin receptor antagonist Itching    Eplerenone Other (See Comments)     Marked bradycardia, 40, tiredness and weakness      Sulfa (sulfonamide antibiotics) Itching     Patient says this was 10 years ago and doesn't remember what happened     I have reviewed past medical, family, and social history. I have reviewed previous nurse notes.    Performance Status:The patient's activity level is functions out of house.          Review of Systems   Constitutional: Negative for activity change, appetite change, chills, diaphoresis, fever and unexpected weight change, or  fatigue   HENT: Negative for dental problem, postnasal drip, sneezing, trouble swallowing and voice change.  Positive for rhinorrhea, sinus pressure, sinus pain, sore throat  Respiratory: Negative for apnea, cough, wheezing and stridor.  Positive for chest tightness, SOB  Cardiovascular: Negative for chest pain, palpitations and leg swelling.   Gastrointestinal: Negative for abdominal distention, abdominal pain, constipation and nausea.   Musculoskeletal: Negative for gait problem, myalgias and neck pain.   Skin: Negative for color change and pallor.   Allergic/Immunologic: Negative for environmental allergies and food allergies.   Neurological: Negative for speech difficulty, weakness, light-headedness, numbness and headaches.  Hematological: Negative for adenopathy. Does  "not bruise/bleed easily.   Psychiatric/Behavioral: Negative for dysphoric mood and sleep disturbance. The patient is not nervous/anxious.           Exam:Comprehensive exam done. /79 (BP Location: Right arm, Patient Position: Sitting)   Pulse 62   Ht 5' 11" (1.803 m)   Wt 107.8 kg (237 lb 10.5 oz)   SpO2 98% Comment: on room air  BMI 33.15 kg/m²   Exam included Vitals as listed  Constitutional: He is oriented to person, place, and time. He appears well-developed. No distress.   Nose: Nose normal.   Mouth/Throat: Uvula is midline, oropharynx is clear and moist and mucous membranes are normal. No dental caries. No oropharyngeal exudate, posterior oropharyngeal edema, posterior oropharyngeal erythema or tonsillar abscesses.  Mallapatti (M) score 3  Eyes: Pupils are equal, round, and reactive to light.   Neck: No JVD present. No thyromegaly present.   Cardiovascular: Normal rate, regular rhythm and normal heart sounds. Exam reveals no gallop and no friction rub.   No murmur heard.  Pulmonary/Chest: Effort normal and breath sounds abnormal: bilateral rhonchi and wheeze. No accessory muscle usage or stridor. No apnea and no tachypnea. No respiratory distress, decreased breath sounds, wheezes, rhonchi, rales, or tenderness.   Abdominal: Soft. He exhibits no mass. There is no tenderness. No hepatosplenomegaly, hernias and normoactive bowel sounds  Musculoskeletal: Normal range of motion. exhibits no edema.   Lymphadenopathy:     He has no cervical adenopathy.     He has no axillary adenopathy.   Neurological:  alert and oriented to person, place, and time. not disoriented.   Skin: Skin is warm and dry. Capillary refill takes less 2 sec. No cyanosis or erythema. No pallor. Nails show no clubbing.   Psychiatric: normal mood and affect. behavior is normal. Judgment and thought content normal.       Radiographs (ct chest and cxr) reviewed: results reviewed   CT Chest Without Contrast 02/12/2019   No acute " intrathoracic process.  Severe atherosclerosis.  Status post CABG.  2 mm lingular micronodule.  For a solid nodule <6 mm, Fleischner Society 2017 guidelines recommend no routine follow up for a low risk patient, or follow-up with non-contrast chest CT at 12 months in a high risk patient.  XR CHEST PA AND LATERAL   02/07/2019   Degenerative changes are seen in the spine and there is anterior paravertebral ossification suggesting diffuse idiopathic skeletal hyperostosis.  Aorta is tortuous and there is evidence of prior median sternotomy.  The heart is enlarged.  No confluent infiltrates and there is no evidence of overt cardiac decompensation.       CT CHEST WITHOUT CONTRAST Date: 11/19/2018   Impression   1. Resolving left lower lobe pneumonia.  2. New airspace and ground-glass disease in the right lung which could reflect pneumonia or aspiration.  3. Small bilateral pleural effusions are new.  4. Redemonstrated pleuroparenchymal nodule at the right lung apex.  As discussed previously, additional follow-up is recommended.  5. Cardiomegaly and coronary artery disease.  6.  Enlarged pulmonary arterial trunk which can be seen in the setting of pulmonary hypertension.  7. Ascending aorta ectasia.            Labs reviewed       Lab Results   Component Value Date    WBC 6.30 01/29/2019    RBC 3.77 (L) 01/29/2019    HGB 11.5 (L) 01/29/2019    HCT 34.9 (L) 01/29/2019    MCV 93 01/29/2019    MCH 30.5 01/29/2019    MCHC 33.0 01/29/2019    RDW 15.4 (H) 01/29/2019     01/29/2019    MPV 7.1 (L) 01/29/2019    GRAN 4.0 01/29/2019    GRAN 63.2 01/29/2019    LYMPH 1.5 01/29/2019    LYMPH 23.6 01/29/2019    MONO 0.5 01/29/2019    MONO 8.1 01/29/2019    EOS 0.3 01/29/2019    BASO 0.00 01/29/2019    EOSINOPHIL 4.7 01/29/2019    BASOPHIL 0.4 01/29/2019   Results for BELINDALAQUITA ARTEM MOREL (MRN [id number]) as of 11/21/2018 11:08   Ref. Range 6/29/2018 12:42 11/6/2018 23:40   IgG - Serum Latest Ref Range: 650 - 1600 mg/dL 1298 684    IgM Latest Ref Range: 50 - 300 mg/dL 25 (L)    IgA Latest Ref Range: 40 - 350 mg/dL 233        PFT results reviewed  Pulmonary Functions Testing Results:  PFT results reviewed Pulmonary Functions, including spirometry and bronchodilator response and lung volumes and diffusion, study was done 1/10/18.  Spirometry shows loss of vital capacity and fev1 with no obstruction and no bronchodilator response.   FEV1 is 71% or 2.29 liters.  Lung volumes show  normal TLC.  Diffusion shows reduced but falls within normal range when corrected for lung volumes.     Pulmonary functions show low vital capacity but otherwise normal. MVV is lower than expected and could be from neuromuscular disease?  Clinical correlation recommended.     Mihir Guillen M.D.    Total face-to-face time with the patient was 30 minutes and greater than 50% was spent in counseling and coordination of care. The above assessment and plan have been discussed at length. Labs, CT of chest, and chest X-ray reviewed in detail with patient. Problem List updated.   Plan:  Clinical impression is resonably certain and repeated evaluation prn +/- follow up will be needed as below.    Micheal was seen today for follow-up, sputum production, cough and asthma.    Diagnoses and all orders for this visit:    Mild asthma with acute exacerbation, unspecified whether persistent  -     betamethasone acetate-betamethasone sodium phosphate injection 12 mg  -     azithromycin (ZITHROMAX) 500 MG tablet; Take 1 tablet (500 mg total) by mouth once daily.  -     predniSONE (DELTASONE) 20 MG tablet; Take one pill a day for three days, repeat for shortness of breath  -     tiotropium bromide (SPIRIVA RESPIMAT) 1.25 mcg/actuation Mist; Inhale 2.5 mcg into the lungs once daily. Controller    Cough variant asthma  -     betamethasone acetate-betamethasone sodium phosphate injection 12 mg  -     predniSONE (DELTASONE) 20 MG tablet; Take one pill a day for three days, repeat for shortness  of breath        Follow-up in about 3 months (around 5/21/2019), or if symptoms worsen or fail to improve.    Discussed with patient above for education the following:      Patient Instructions   Continue to use cough suppressant medication as needed to control severe coughing spells.    Asthma Exacerbation    Steroid injection in clinic today.    Take Spiriva 2 puffs twice a day every day.    Start Spiriva 2 puffs once a day every day    Azithromycin 500 mg a day for 3 days     Albuterol nebulizer use in evenings once a day until cough resolves, can take up to 4x a day as needed.    At night take 2 puffs rescue inhaler when coughing wakes you up.

## 2019-02-21 NOTE — PATIENT INSTRUCTIONS
Continue to use cough suppressant medication as needed to control severe coughing spells.    Asthma Exacerbation    Steroid injection in clinic today.    Take Spiriva 2 puffs twice a day every day.    Start Spiriva 2 puffs once a day every day    Azithromycin 500 mg a day for 3 days     Albuterol nebulizer use in evenings once a day until cough resolves, can take up to 4x a day as needed.    At night take 2 puffs rescue inhaler when coughing wakes you up.

## 2019-02-25 ENCOUNTER — PATIENT MESSAGE (OUTPATIENT)
Dept: PULMONOLOGY | Facility: CLINIC | Age: 79
End: 2019-02-25

## 2019-02-25 ENCOUNTER — OUTPATIENT CASE MANAGEMENT (OUTPATIENT)
Dept: ADMINISTRATIVE | Facility: OTHER | Age: 79
End: 2019-02-25

## 2019-02-25 NOTE — PROGRESS NOTES
"02/25/2019  Summary:  RN-CM will close case today as patient has not returned my calls nor responded to "attempt to follow up" letter sent on 2/10/19. Juma Kumar, RN-OPCM        "

## 2019-02-26 ENCOUNTER — OFFICE VISIT (OUTPATIENT)
Dept: PULMONOLOGY | Facility: CLINIC | Age: 79
End: 2019-02-26
Payer: MEDICARE

## 2019-02-26 ENCOUNTER — OFFICE VISIT (OUTPATIENT)
Dept: PHYSICAL MEDICINE AND REHAB | Facility: CLINIC | Age: 79
End: 2019-02-26
Payer: MEDICARE

## 2019-02-26 VITALS
DIASTOLIC BLOOD PRESSURE: 71 MMHG | BODY MASS INDEX: 33.18 KG/M2 | HEART RATE: 75 BPM | WEIGHT: 237 LBS | SYSTOLIC BLOOD PRESSURE: 135 MMHG | HEIGHT: 71 IN | OXYGEN SATURATION: 98 %

## 2019-02-26 VITALS
WEIGHT: 237 LBS | HEART RATE: 68 BPM | SYSTOLIC BLOOD PRESSURE: 134 MMHG | DIASTOLIC BLOOD PRESSURE: 80 MMHG | HEIGHT: 71 IN | BODY MASS INDEX: 33.18 KG/M2

## 2019-02-26 DIAGNOSIS — M79.10 MYALGIA: ICD-10-CM

## 2019-02-26 DIAGNOSIS — M54.2 CERVICALGIA: ICD-10-CM

## 2019-02-26 DIAGNOSIS — J45.31 MILD PERSISTENT ASTHMA WITH ACUTE EXACERBATION: Primary | ICD-10-CM

## 2019-02-26 PROCEDURE — 99214 OFFICE O/P EST MOD 30 MIN: CPT | Mod: 25,S$PBB,, | Performed by: PHYSICAL MEDICINE & REHABILITATION

## 2019-02-26 PROCEDURE — 99999 PR PBB SHADOW E&M-EST. PATIENT-LVL III: CPT | Mod: PBBFAC,,, | Performed by: NURSE PRACTITIONER

## 2019-02-26 PROCEDURE — 20553 PR INJECT TRIGGER POINTS, > 3: ICD-10-PCS | Mod: S$PBB,,, | Performed by: PHYSICAL MEDICINE & REHABILITATION

## 2019-02-26 PROCEDURE — 99213 OFFICE O/P EST LOW 20 MIN: CPT | Mod: PBBFAC,25,27,PO | Performed by: NURSE PRACTITIONER

## 2019-02-26 PROCEDURE — 99999 PR PBB SHADOW E&M-EST. PATIENT-LVL III: ICD-10-PCS | Mod: PBBFAC,,, | Performed by: NURSE PRACTITIONER

## 2019-02-26 PROCEDURE — 20553 NJX 1/MLT TRIGGER POINTS 3/>: CPT | Mod: S$PBB,,, | Performed by: PHYSICAL MEDICINE & REHABILITATION

## 2019-02-26 PROCEDURE — 99214 OFFICE O/P EST MOD 30 MIN: CPT | Mod: PBBFAC,PN,25 | Performed by: PHYSICAL MEDICINE & REHABILITATION

## 2019-02-26 PROCEDURE — 99999 PR PBB SHADOW E&M-EST. PATIENT-LVL IV: CPT | Mod: PBBFAC,,, | Performed by: PHYSICAL MEDICINE & REHABILITATION

## 2019-02-26 PROCEDURE — 99213 PR OFFICE/OUTPT VISIT, EST, LEVL III, 20-29 MIN: ICD-10-PCS | Mod: S$PBB,,, | Performed by: NURSE PRACTITIONER

## 2019-02-26 PROCEDURE — 20553 NJX 1/MLT TRIGGER POINTS 3/>: CPT | Mod: PBBFAC,PN | Performed by: PHYSICAL MEDICINE & REHABILITATION

## 2019-02-26 PROCEDURE — 99214 PR OFFICE/OUTPT VISIT, EST, LEVL IV, 30-39 MIN: ICD-10-PCS | Mod: 25,S$PBB,, | Performed by: PHYSICAL MEDICINE & REHABILITATION

## 2019-02-26 PROCEDURE — 99999 PR PBB SHADOW E&M-EST. PATIENT-LVL IV: ICD-10-PCS | Mod: PBBFAC,,, | Performed by: PHYSICAL MEDICINE & REHABILITATION

## 2019-02-26 PROCEDURE — 99213 OFFICE O/P EST LOW 20 MIN: CPT | Mod: S$PBB,,, | Performed by: NURSE PRACTITIONER

## 2019-02-26 RX ORDER — PREDNISONE 20 MG/1
TABLET ORAL
Qty: 36 TABLET | Refills: 0 | Status: ON HOLD | OUTPATIENT
Start: 2019-02-26 | End: 2019-03-14 | Stop reason: SDUPTHER

## 2019-02-26 RX ORDER — IPRATROPIUM BROMIDE AND ALBUTEROL SULFATE 2.5; .5 MG/3ML; MG/3ML
3 SOLUTION RESPIRATORY (INHALATION) EVERY 6 HOURS PRN
Qty: 270 ML | Refills: 0 | Status: SHIPPED | OUTPATIENT
Start: 2019-02-26 | End: 2020-01-01 | Stop reason: ALTCHOICE

## 2019-02-26 RX ORDER — ALBUTEROL SULFATE 1.25 MG/3ML
1.25 SOLUTION RESPIRATORY (INHALATION) EVERY 6 HOURS PRN
Status: DISCONTINUED | OUTPATIENT
Start: 2019-02-26 | End: 2019-04-08 | Stop reason: HOSPADM

## 2019-02-26 RX ORDER — LIDOCAINE HYDROCHLORIDE 10 MG/ML
3 INJECTION INFILTRATION; PERINEURAL
Status: COMPLETED | OUTPATIENT
Start: 2019-02-26 | End: 2019-02-26

## 2019-02-26 RX ORDER — IPRATROPIUM BROMIDE AND ALBUTEROL SULFATE 2.5; .5 MG/3ML; MG/3ML
3 SOLUTION RESPIRATORY (INHALATION) EVERY 6 HOURS PRN
Qty: 270 ML | Refills: 0 | Status: SHIPPED | OUTPATIENT
Start: 2019-02-26 | End: 2019-02-26 | Stop reason: SDUPTHER

## 2019-02-26 RX ADMIN — ALBUTEROL SULFATE 1.25 MG: 1.25 SOLUTION RESPIRATORY (INHALATION) at 11:02

## 2019-02-26 RX ADMIN — LIDOCAINE HYDROCHLORIDE 3 ML: 10 INJECTION INFILTRATION; PERINEURAL at 11:02

## 2019-02-26 NOTE — PATIENT INSTRUCTIONS
Asthma Exacerbation treatment  Albuterol Duo Neb nebulizer every 6 hours    Prednisone 3 pills for 3 days, 2 pills for 3 days, and 1 pill for 3 days.    Continue all medications    Sputum culture if able to bring any sputum up

## 2019-02-26 NOTE — PROGRESS NOTES
2/26/2019    Micheal Hunt  Office Note    Chief Complaint   Patient presents with    Shortness of Breath     left lung feels like it is burning       HPI: 2/26/2019- failing out patient therapy. Dry cough, SOB with exertion, had celestone injection last week felt better for few days. Symptoms returned. Fells like right chest burning. No albuterol treatment since night prior.  Finished prednisone 20 mg for 3 days. Has not repeated.    2/21/2019- cough unchanged no improvement. Took prednisone taper helped for few days but cough persists, worse at night, severe.  Productive white in color. Albuterol inhaler 1x daily mostly in evenings.   2-3 nocturnal arousals nightly,     02/07/2019- Cough: onset 1 week, worse at night, productive white in color. SOB with exertion, relieved with rest, and albuterol.  No nocturnal arousals, Albuterol rescue inhaler 1x daily, taking symbicort daily.      11/21/18 Admitted [facility] for Pneumonia discharged Admit Date: 11/5/2018-11/09/2018.   SOB improved since discharge, SOB onset few months Summer time, SOB and chest tightness with exertion walking, relieved with rest. Not currently taking symbicort inhaler or albuterol inhaler.   Currently on Cipro half dose due ESRD, on dialysis.      Previous HPI Dr. 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     Has yr round sinus problems - drainage ,  Clear to green,  abx help but avoids with renal dz.  DR Nails did sinus work - saw Dr Bartlett - penuomococcal titers were low, no vaccine     Lots of cough and h/o wheezes, not sob.  Has lung burning off and on  - burns when exerts - releif with rest.  Duration 5-10 min but stops with rest.     Sees Dr Dunlap,   Started 2010 when moved to [facility]- onset with chest burning while loading hay.      pft with low MVV, denies sob now nor muscle weakness.     Bell Wilkinson NP   HPI:   Micheal Hunt is a 79 yo male with  PMHx of CKD stage 5, CAD, HTN, BPH, and HLD.  He was admitted to the service of hospital medicine with HCAP.  He presented to the ED with a one day onset of fever and cough.  Symptoms were associated with fatigue, nausea, wheezing and hemoptysis. He stated that he was recently admitted for a stomach virus about three weeks ago.  He denied chest pain, sob, abdominal pain, vomiting, diarrhea, dysuria and leg swelling.     * No surgery found *       Hospital Course:   Patient monitored closely during hospitalization. Telemetry ordered. He did have a short run of Vtach and was asymptomatic. Cardiology consulted. He was initiated on IV antibiotics for pneumonia. Pulmonary consulted. He received oral steroids during admission. Nephrology consulted for CKD stage V management. Patient encouraged to initiated HD, however is not open to starting this admission, He wishes to follow up with Dr. Bishop. He was noted to have diastolic HF and received Lasix 60 mg IV with good response. Dyspnea improved. ECHO obtained. Labs monitored. Procalcitonin negative. He is feeling better and is stable for DC from pulmonary, nephrology, and cardiac standpoint.      PE; chest coarse. Heart RRR with Murmum LUE fistula      Mihir Guillen MD Physician Pulmonology   Nov 7, sl wheezes, feels better, ask for tramadol tid (renal failure max dose 200/d),  Follows dr Bishop,    Nov 8- feels better but had small amount brb hemoptysis with some chest pain this am. Smaller amount than 2 prior episodes.  Nov 9, says chest discomfort (denied chest pain when I initially saw pt) and cough are resolved.  Feels well now.     Plan:   Nov 7, sl wheezes, feels better, ask for tramadol tid (renal failure max dose 200/d),  Follows dr Bishop,  Pt ambulating and seems to be doing well.     Proteinuria noted, non gap acidosis likely renal- bicarb reasonable.  There is no pulmonary artery aneursym.       Pt was on oral cipro sice 10/ bid with renal failure and  apparently  Failed. There are no blood cultures resulted on epic at this time?  No sputum submitted?   Order bicarb and dose prednisone x2.       Best to monitor another day as failed outpt abx?        Addendum- pt says did not take cipro for fear side effects.        Nov 8- ct reviewed as was today's cxr.  Pt is being rx for abx after presenting with fever and chill and hemoptysis - ct had airways open and lll infiltrate.  Pt had some brb hemoptysis today.     cta would be a problem with renal status. Will check d dimer and u/s legs and monitor.  Airways ok on ct -  Dx not clear.  Pt is on low dose steroid.  There is no concern re pulm art aneurysm.     Nov 9,  Sputum from yesterday pending this am.  Pt did not use cipro pta- was ordered but pt didn't take.  Pt did have chill, fever, hemoptysis at admit with ? vague slight recurrence yesterday?  procalcitonin was 0.18 admit, below 0.10 would suggest not pneumonia.  No cta due to renal failure.       If no dvt on leg u/s this am--outpt on abx reasonable.  Will f/u in office with ct to assure left lower lung abn clears.  Working dx is pneumonia- has some atypical features.       Electronically signed by Mihir Guillen MD at 11/9/2018       The chief compliant  problem is worsening  PFSH:  Past Medical History:   Diagnosis Date    NICK (acute kidney injury) 7/29/2016    Allergy     Anemia, mild 12/15/2014    Arthritis     Gout    Benign essential HTN 3/27/2012    BMI 29.0-29.9,adult 5/10/2018    BPH (benign prostatic hyperplasia)     BPH (benign prostatic hyperplasia)     CAD (coronary artery disease) 2006    Chronic kidney disease     due to ibuprofen    Colon polyp     CRF (chronic renal failure), stage 5     Diverticulosis     Gastritis     GERD (gastroesophageal reflux disease)     Gout     History of colon polyps 5/3/2018    HTN (hypertension) 3/27/2012    Hyperlipidemia     Hyperlipidemia     LLL pneumonia 6/14/2018    LVH (left ventricular  hypertrophy)     Mesenteric ischemia     Murmur, cardiac 3/27/2012    GRICELDA (obstructive sleep apnea)     DOES NOT USE A MACHINE    Sinus problem     Syncope and collapse          Past Surgical History:   Procedure Laterality Date    APPENDECTOMY      BLOCK-NERVE Left 5/31/2016    [facility]    CARDIAC CATHETERIZATION      COLONOSCOPY  2011    COLONOSCOPY N/A 5/3/2018    [facility]    COLONOSCOPY N/A 9/10/2015    [facility]    CORONARY ARTERY BYPASS GRAFT  4/2007    x 1    CYSTOSCOPY N/A 8/30/2017    [facility] at [facility]    CYSTOSCOPY N/A 11/10/2015    [facility]    ESOPHAGOGASTRODUODENOSCOPY (EGD) N/A 1/4/2016    [facility] ([facility]ND F[facility])    ESOPHAGOGASTRODUODENOSCOPY (EGD) N/A 10/15/2014    [facility]    INJECTION-STEROID-EPIDURAL-TRANSFORAMINAL Left 2/25/2016    [facility]    JOINT REPLACEMENT      left knee total replacement  X 3    mid leftt finger      from a cactuss    MOLE REMOVAL  2016    RADIOFREQUENCY THERMOCOAGULATION (RFTC)-NERVE-MEDIAN BRANCH-LUMBAR Left 4/11/2016    [facility]    rotative cuff      no rotative cuffs on bilat shoulders has pins     SHOULDER SURGERY      shoulder surgery bilat  RIGHT X 4; LEFT X 3    TRANSRECTAL ULTRASOUND GUIDED PROSTATE BIOPSY Bilateral 11/10/2015    [facility]    ULTRASOUND-ENDOSCOPIC-UPPER N/A 5/31/2017    [facility] ENDO (2ND FLR)    ULTRASOUND-ENDOSCOPIC-UPPER N/A 1/4/2016    [facility] Tra [facility] at [facility] ENDO (2ND FLR)    ULTRASOUND-ENDOSCOPIC-UPPER N/A 1/16/2015    Performed by Jason Saleem [facility] at [facility] ENDO (2ND FLR)     Social History     Tobacco Use    Smoking status: Never Smoker     Smokeless tobacco: Never Used   Substance Use Topics    Alcohol use: No    Drug use: No     Family History   Problem Relation Age of Onset    Heart disease Mother     Sudden death Father     Cancer Father         advanced lung ca- found after 2 story fall    Stroke Sister     Pneumonia Sister          from PNA    Heart disease Sister     Hypertension Brother     Kidney cancer Neg Hx     Prostate cancer Neg Hx     Urolithiasis Neg Hx     Allergic rhinitis Neg Hx     Allergies Neg Hx     Angioedema Neg Hx     Asthma Neg Hx     Atopy Neg Hx     Eczema Neg Hx     Immunodeficiency Neg Hx     Rhinitis Neg Hx     Urticaria Neg Hx      Review of patient's allergies indicates:   Allergen Reactions    Ace inhibitors Other (See Comments)     Cough    Arb-angiotensin receptor antagonist Itching    Eplerenone Other (See Comments)     Marked bradycardia, 40, tiredness and weakness      Sulfa (sulfonamide antibiotics) Itching     Patient says this was 10 years ago and doesn't remember what happened     I have reviewed past medical, family, and social history. I have reviewed previous nurse notes.    Performance Status:The patient's activity level is functions out of house.          Review of Systems   Constitutional: Negative for activity change, appetite change, chills, diaphoresis, fever and unexpected weight change, or  fatigue   HENT: Negative for dental problem, postnasal drip, sneezing, trouble swallowing and voice change.  Positive for rhinorrhea, sinus pressure, sinus pain, sore throat  Respiratory: Negative for apnea, cough, wheezing and stridor.  Positive for chest tightness, SOB  Cardiovascular: Negative for chest pain, palpitations and leg swelling.   Gastrointestinal: Negative for abdominal distention, abdominal pain, constipation and nausea.   Musculoskeletal: Negative for gait problem, myalgias and neck pain.   Skin: Negative for color change and pallor.   Allergic/Immunologic: Negative  "for environmental allergies and food allergies.   Neurological: Negative for speech difficulty, weakness, light-headedness, numbness and headaches.  Hematological: Negative for adenopathy. Does not bruise/bleed easily.   Psychiatric/Behavioral: Negative for dysphoric mood and sleep disturbance. The patient is not nervous/anxious.           Exam:Comprehensive exam done. /71 (BP Location: Right arm, Patient Position: Sitting)   Pulse 75   Ht 5' 11" (1.803 m)   Wt 107.5 kg (236 lb 15.9 oz)   SpO2 98% Comment: on room air  BMI 33.05 kg/m²   Exam included Vitals as listed  Constitutional: He is oriented to person, place, and time. He appears well-developed. No distress.   Nose: Nose normal.   Mouth/Throat: Uvula is midline, oropharynx is clear and moist and mucous membranes are normal. No dental caries. No oropharyngeal exudate, posterior oropharyngeal edema, posterior oropharyngeal erythema or tonsillar abscesses.  Mallapatti (M) score 3  Eyes: Pupils are equal, round, and reactive to light.   Neck: No JVD present. No thyromegaly present.   Cardiovascular: Normal rate, regular rhythm and normal heart sounds. Exam reveals no gallop and no friction rub.   No murmur heard.  Pulmonary/Chest: Effort normal and breath sounds abnormal: bilateral rhonchi and wheeze. No accessory muscle usage or stridor. No apnea and no tachypnea. No respiratory distress, decreased breath sounds, wheezes, rhonchi, rales, or tenderness.   Abdominal: Soft. He exhibits no mass. There is no tenderness. No hepatosplenomegaly, hernias and normoactive bowel sounds  Musculoskeletal: Normal range of motion. exhibits no edema.   Lymphadenopathy:     He has no cervical adenopathy.     He has no axillary adenopathy.   Neurological:  alert and oriented to person, place, and time. not disoriented.   Skin: Skin is warm and dry. Capillary refill takes less 2 sec. No cyanosis or erythema. No pallor. Nails show no clubbing.   Psychiatric: normal mood " and affect. behavior is normal. Judgment and thought content normal.     Breathing improved wheeze resolved post nebulized Albuterol    Radiographs (ct chest and cxr) reviewed: results reviewed   CT Chest Without Contrast 02/12/2019   No acute intrathoracic process.  Severe atherosclerosis.  Status post CABG.  2 mm lingular micronodule.  For a solid nodule <6 mm, Fleischner Society 2017 guidelines recommend no routine follow up for a low risk patient, or follow-up with non-contrast chest CT at 12 months in a high risk patient.  XR CHEST PA AND LATERAL   02/07/2019   Degenerative changes are seen in the spine and there is anterior paravertebral ossification suggesting diffuse idiopathic skeletal hyperostosis.  Aorta is tortuous and there is evidence of prior median sternotomy.  The heart is enlarged.  No confluent infiltrates and there is no evidence of overt cardiac decompensation.       CT CHEST WITHOUT CONTRAST Date: 11/19/2018   Impression   1. Resolving left lower lobe pneumonia.  2. New airspace and ground-glass disease in the right lung which could reflect pneumonia or aspiration.  3. Small bilateral pleural effusions are new.  4. Redemonstrated pleuroparenchymal nodule at the right lung apex.  As discussed previously, additional follow-up is recommended.  5. Cardiomegaly and coronary artery disease.  6.  Enlarged pulmonary arterial trunk which can be seen in the setting of pulmonary hypertension.  7. Ascending aorta ectasia.            Labs reviewed       Lab Results   Component Value Date    WBC 6.30 01/29/2019    RBC 3.77 (L) 01/29/2019    HGB 11.5 (L) 01/29/2019    HCT 34.9 (L) 01/29/2019    MCV 93 01/29/2019    MCH 30.5 01/29/2019    MCHC 33.0 01/29/2019    RDW 15.4 (H) 01/29/2019     01/29/2019    MPV 7.1 (L) 01/29/2019    GRAN 4.0 01/29/2019    GRAN 63.2 01/29/2019    LYMPH 1.5 01/29/2019    LYMPH 23.6 01/29/2019    MONO 0.5 01/29/2019    MONO 8.1 01/29/2019    EOS 0.3 01/29/2019    BASO 0.00  01/29/2019    EOSINOPHIL 4.7 01/29/2019    BASOPHIL 0.4 01/29/2019         PFT results reviewed  Pulmonary Functions Testing Results:  PFT results reviewed Pulmonary Functions, including spirometry and bronchodilator response and lung volumes and diffusion, study was done 1/10/18.  Spirometry shows loss of vital capacity and fev1 with no obstruction and no bronchodilator response.   FEV1 is 71% or 2.29 liters.  Lung volumes show  normal TLC.  Diffusion shows reduced but falls within normal range when corrected for lung volumes.     Pulmonary functions show low vital capacity but otherwise normal. MVV is lower than expected and could be from neuromuscular disease?  Clinical correlation recommended.     Mihir Guillen M.D.      Plan:  Clinical impression is resonably certain and repeated evaluation prn +/- follow up will be needed as below.    Micheal was seen today for shortness of breath.    Diagnoses and all orders for this visit:    Mild persistent asthma with acute exacerbation  -     albuterol nebulizer solution 1.25 mg  -     Culture, Respiratory with Gram Stain; Future  -     predniSONE (DELTASONE) 20 MG tablet; 3 pills for 3 days, 2 pills for 3 days, 1 pill for 3 days, repeat if needed.  -     Discontinue: albuterol-ipratropium (DUO-NEB) 2.5 mg-0.5 mg/3 mL nebulizer solution; Take 3 mLs by nebulization every 6 (six) hours as needed for Wheezing or Shortness of Breath.  -     NEBULIZER FOR HOME USE  -     albuterol-ipratropium (DUO-NEB) 2.5 mg-0.5 mg/3 mL nebulizer solution; Take 3 mLs by nebulization every 6 (six) hours as needed for Wheezing or Shortness of Breath.        Follow-up in about 4 weeks (around 3/26/2019), or if symptoms worsen or fail to improve.    Discussed with patient above for education the following:      Patient Instructions   Asthma Exacerbation treatment  Albuterol Duo Neb nebulizer every 6 hours    Prednisone 3 pills for 3 days, 2 pills for 3 days, and 1 pill for 3 days.    Continue all  medications    Sputum culture if able to bring any sputum up

## 2019-02-26 NOTE — PROGRESS NOTES
HPI:  Patient is a 78 y.o. year old male w. B/l knee pain. He is s/ left TKR in 2013 . He is s/p saphenous nerve block+hydrodissection on left knee and s/p synvisc on right. His pain is better. But still having w. Prolonged walking. It is worse in am. Unable to take nsaids d/t kidneys. .Knee xrays indicate significant tricompartmental djd.    He continues to have wrist pain. MRI indicated Full thickness partial tear of TFCC. He is scheduled for surgery next week.  Today, he is complaining of neck pain. It is difficult w. Turning of his head and certain positions.    Imaging    X-Ray Cervical Spine AP And Lateral   Order: [id number]   Status:  Final result   Visible to patient:  Yes (Patient Portal) Next appt:  Today at 11:00 AM in Pulmonology (Telma Powers NP) Dx:  Neck pain; Injury   Details     Reading Physician Reading Date Result Priority   Huang Treadwell MD 10/17/2018       Narrative     EXAMINATION:  XR CERVICAL SPINE AP LATERAL    CLINICAL HISTORY:  Injury, unspecified, initial encounter    TECHNIQUE:  AP, lateral and open mouth views of the cervical spine were performed.    COMPARISON:  None.    FINDINGS:  Mild straightening normal cervical lordosis.  Cervical vertebral bodies otherwise normal in height and alignment.  No acute fracture.  Partial interbody fusion of C5-C6.    No significant prevertebral soft tissue swelling.    There is moderate to severe multilevel degenerative disc disease with large bridging anterior osteophytosis.    Extensive bilateral calcified atherosclerotic change of the carotid arteries.      Impression       1. Partial body fusion of C5-C6.  2. Moderate to severe multilevel degenerative disc disease with large bridging anterior osteophytosis.  3. No significant prevertebral soft tissue swelling.               MRI Wrist Joint Without Contrast Left   Order: [id number]   Status:  Final result   Visible to patient:  Yes (Patient Portal) Next appt:  01/29/2019 at 09:20 AM in  Hematology and Oncology (Mikala Wells MD) Dx:  Ligament laxity   Details             Reading Physician Reading Date Result Priority   Sean Avendaño MD 1/15/2019         Narrative       EXAMINATION:  MRI WRIST WITHOUT CONTRAST LEFT    CLINICAL HISTORY:  Dislocation and sprain of joints and ligaments at wrs/hnd lv;  Disorder of ligament, unspecified site    TECHNIQUE:  Multiplanar multisequence MR imaging of the left wrist without the administration of contrast.    COMPARISON:  10/11/2018    FINDINGS:  Bones: No acute fracture AVN or marrow replacement.  There is a type 2 lunate.  There are multiple subchondral cysts throughout the proximal and mid carpal row.  There is also a large subchondral cyst at the base of the 1st metacarpal.    Joint: Moderate joint space loss distal radioulnar joint with a small effusion.  Irregular joint space loss throughout the radiocarpal joint worst and moderate on the radiolunate articulation.  No radiocarpal effusion or synovitis.  Areas of full-thickness chondral loss along the proximal pole of the capitate and hamate.  Mild joint space loss triscaphe joint.  Severe joint space loss with bone-on-bone contact in the 1st CMC joint.    TFCC: There is a tear at the radial attachment as can be seen series 5, image 9.  Ulnar styloid attachment is preserved as are the volar and dorsal radioulnar ligaments..    Intrinsic Ligaments: Assessment limited without intraarticular distension.  Lunotriquetral ligament intact.  Scapholunate ligament is thickened with intermediate signal although does not demonstrate a discrete tear.    Extensor Tendons: Intact.    Flexor Tendons: Intact.    Neurovascular: Unremarkable.    Soft Tissues: There is 1 cm septated high T2 signal lesion in the soft tissues volar to the 1st CMC joint.  Normal muscle volume and signal.       Impression         1. Full-thickness partial width tear of the TFCC at the radial attachment.  2. Scattered carpal degenerative  changes worst at the radiolunate interval and 1st CMC.  Erosive osteoarthritis is possible.  3. Probable ganglion cyst volar to the 1st CMC joint.                  Imaging  -Ray Knee Complete 4 Or More Views Right   Order: [id number]   Status:  Final result   Visible to patient:  Yes (Patient Portal) Next appt:  01/29/2019 at 09:20 AM in Hematology and Oncology (Mikala Wells MD) Dx:  Chronic pain of right knee   Details             Reading Physician Reading Date Result Priority   Shun Pacheco MD 5/9/2017         Narrative       Comparison: 10/18/16    Technique: AP, lateral, oblique and sunrise views of the right knee.    Findings: There is tricompartmental joint space narrowing and osteophytosis in each knee, most severe involving the medial and patellofemoral compartments. A small right knee joint effusion is seen. Extensor mechanism enthesophyte formation is noted. No fracture or dislocation is seen. Diffuse atherosclerosis is seen.    Surgical clip noted in the medial aspect of the knee just below the joint line.       Impression         1.  Tricompartmental osteoarthritis in the right knee, similar compared to the 10/18/16 study. This is most severe in the medial and patellofemoral compartments. Small right knee joint effusion. No acute abnormality is seen.                    Past Medical History:   Diagnosis Date    NICK (acute kidney injury) 7/29/2016    Allergy      Anemia, mild 12/15/2014    Arthritis       Gout    Benign essential HTN 3/27/2012    BMI 29.0-29.9,adult 5/10/2018    BPH (benign prostatic hyperplasia)      BPH (benign prostatic hyperplasia)      CAD (coronary artery disease) 2006    Chronic kidney disease       due to ibuprofen    Colon polyp      CRF (chronic renal failure), stage 5      Diverticulosis      Gastritis      GERD (gastroesophageal reflux disease)      Gout      History of colon polyps 5/3/2018    HTN (hypertension) 3/27/2012    Hyperlipidemia       Hyperlipidemia      LLL pneumonia 6/14/2018    LVH (left ventricular hypertrophy)      Mesenteric ischemia      Murmur, cardiac 3/27/2012    GRICELDA (obstructive sleep apnea)       DOES NOT USE A MACHINE    Sinus problem      Syncope and collapse              Past Surgical History:   Procedure Laterality Date    APPENDECTOMY        BLOCK-NERVE Left 5/31/2016     [facility]    CARDIAC CATHETERIZATION        COLONOSCOPY   2011    COLONOSCOPY N/A 5/3/2018     Performed by [facility] at [facility] ENDO    COLONOSCOPY N/A 9/10/2015     Performed by [facility] at [facility] END[facility]    CORONARY ARTERY BYPASS GRAFT   4/2007     x 1    CYSTOSCOPY N/A 8/30/2017     [facility] by [facility] at [facility]    CYSTOSCOPY N/A 11/10/2015     Performed by [facility] at [facility] OR    ESOPHAGOGASTRODUODENOSCOPY (EGD) N/A 1/4/2016     Performed by Tra [facility] at [facility] (2ND FLR)    ESOPHAGOGASTRODUODENOSCOPY (EGD) N/A 10/15/2014     Performed by Messi Harris MD at [facility] ENDO    INJECTION-STEROID-EPIDURAL-TRANSFORAMINAL Left 2/25/2016     Per[facility] by [facility] at [facility]    JOINT REPLACEMENT         left knee total replacement  X 3    mid leftt finger         from a cactuss    MOLE REMOVAL   2016    RADIOFREQUENCY THERMOCOAGULATION (RFTC)-NERVE-MEDIAN BRANCH-LUMBAR Left 4/11/2016     Per[facility] by [facility] at [facility]    rotative cuff         no rotative cuffs on bilat shoulders has pins     SHOULDER SURGERY         shoulder surgery bilat  RIGHT X 4; LEFT X 3    TRANSRECTAL ULTRASOUND GUIDED PROSTATE BIOPSY Bilateral 11/10/2015     Performed by Rudy [facility]ring [facility] at [facility] OR    ULTRASOUND-ENDOSCOPIC-UPPER N/A 5/31/2017     Performed by [facility] at [facility] ENDO (2ND FLR)    ULTRASOUND-ENDOSCOPIC-UPPER N/A 1/4/2016     Performed by Tra [facility] at [facility] ENDO (2ND FLR)    ULTRASOUND-ENDOSCOPIC-UPPER N/A 1/16/2015     Performed by  Jason [facility]em [facility] at [facility] (2ND FLR)            Family History   Problem Relation Age of Onset    Heart disease Mother      Sudden death Father      Cancer Father           advanced lung ca- found after 2 story fall    Stroke Sister      Pneumonia Sister            from PNA    Heart disease Sister      Hypertension Brother      Kidney cancer Neg Hx      Prostate cancer Neg Hx      Urolithiasis Neg Hx      Allergic rhinitis Neg Hx      Allergies Neg Hx      Angioedema Neg Hx      Asthma Neg Hx      Atopy Neg Hx      Eczema Neg Hx      Immunodeficiency Neg Hx      Rhinitis Neg Hx      Urticaria Neg Hx        Social History            Socioeconomic History    Marital status:        Spouse name: Not on file    Number of children: Not on file    Years of education: Not on file    Highest education level: Not on file   Social Needs    Financial resource strain: Not on file    Food insecurity - worry: Not on file    Food insecurity - inability: Not on file    Transportation needs - medical: Not on file    Transportation needs - non-medical: Not on file   Occupational History    Not on file   Tobacco Use    Smoking status: Never Smoker    Smokeless tobacco: Never Used   Substance and Sexual Activity    Alcohol use: No    Drug use: No    Sexual activity: Not on file   Other Topics Concern    Not on file   Social History Narrative     [place]; daughter nearby               Review of patient's allergies indicates:   Allergen Reactions    Ace inhibitors Other (See Comments)       Cough    Arb-angiotensin receptor antagonist Itching    Eplerenone Other (See Comments)       Marked bradycardia, 40, tiredness and weakness       Sulfa (sulfonamide antibiotics) Itching       Patient says this was 10 years ago and doesn't remember what happened        Current Outpatient Medications:     albuterol (PROVENTIL/VENTOLIN HFA) 90 mcg/actuation inhaler, 2 puffs every 4  hours as needed for cough, wheeze, or shortness of breath, Disp: 3 Inhaler, Rfl: 3    albuterol-ipratropium (DUO-NEB) 2.5 mg-0.5 mg/3 mL nebulizer solution, INHALE 1 VIAL BY NEBULIZER EVERY 6 HOURS AS NEEDED FOR WHEEZING, Disp: 270 mL, Rfl: 0    allopurinol (ZYLOPRIM) 100 MG tablet, TAKE 1 TABLET BY MOUTH EVERY DAY, Disp: 90 tablet, Rfl: 1    aspirin (ECOTRIN) 81 MG EC tablet, Take 81 mg by mouth once daily.  , Disp: , Rfl:     atorvastatin (LIPITOR) 80 MG tablet, TAKE 1 TABLET (80 MG TOTAL) BY MOUTH ONCE DAILY., Disp: 90 tablet, Rfl: 3    azithromycin (ZITHROMAX) 500 MG tablet, Take 1 tablet (500 mg total) by mouth once daily., Disp: 3 tablet, Rfl: 2    budesonide-formoterol 160-4.5 mcg (SYMBICORT) 160-4.5 mcg/actuation HFAA, Inhale 2 puffs into the lungs every 12 (twelve) hours. Controller, Disp: 3 Inhaler, Rfl: 4    carvedilol (COREG) 6.25 MG tablet, Take 1 tablet (6.25 mg total) by mouth 2 (two) times daily with meals., Disp: 60 tablet, Rfl: 3    finasteride (PROSCAR) 5 mg tablet, TAKE 1 TABLET ONCE DAILY., Disp: 90 tablet, Rfl: 3    fluticasone (FLONASE) 50 mcg/actuation nasal spray, 2 sprays (100 mcg total) by Each Nare route once daily., Disp: 3 Bottle, Rfl: 3    gabapentin (NEURONTIN) 300 MG capsule, Take 1 capsule (300 mg total) by mouth every evening., Disp: 90 capsule, Rfl: 3    isosorbide mononitrate (ISMO,MONOKET) 10 mg tablet, TAKE 1 TABLET BY MOUTH EVERY DAY IN THE EVENING, Disp: 30 tablet, Rfl: 3    levoFLOXacin (LEVAQUIN) 500 MG tablet, Take 1 tablet (500 mg total) by mouth once daily., Disp: 7 tablet, Rfl: 0    losartan (COZAAR) 100 MG tablet, TAKE 1 TABLET BY MOUTH EVERY DAY, Disp: 90 tablet, Rfl: 0    meclizine (ANTIVERT) 25 mg tablet, TAKE 1 TABLET BY MOUTH THREE TIMES A DAY AS NEEDED, Disp: 90 tablet, Rfl: 0    mirtazapine (REMERON) 7.5 MG Tab, TAKE 1 TABLET BY MOUTH EVERY EVENING., Disp: 30 tablet, Rfl: 2    montelukast (SINGULAIR) 10 mg tablet, Take 1 tablet (10 mg total) by  mouth every evening., Disp: 90 tablet, Rfl: 1    NITROSTAT 0.4 mg SL tablet, PLACE 1 TABLET (0.4 MG TOTAL) UNDER THE TONGUE EVERY 5 (FIVE) MINUTES AS NEEDED FOR CHEST PAIN., Disp: 25 tablet, Rfl: 3    omeprazole (PRILOSEC) 20 MG capsule, TAKE 1 CAPSULE BY MOUTH EVERY DAY, Disp: 30 capsule, Rfl: 1    predniSONE (DELTASONE) 20 MG tablet, Take one pill a day for three days, repeat for shortness of breath, Disp: 9 tablet, Rfl: 2    sodium chloride (SALINE NASAL MIST) 3 % Mist, 1 spray by Nasal route 2 (two) times daily., Disp: , Rfl:     tamsulosin (FLOMAX) 0.4 mg Cap, TAKE 1 CAPSULE BY MOUTH EVERY DAY, Disp: 30 capsule, Rfl: 2    tiotropium bromide (SPIRIVA RESPIMAT) 1.25 mcg/actuation Mist, Inhale 2.5 mcg into the lungs once daily. Controller, Disp: 4 g, Rfl: 5    torsemide (DEMADEX) 20 MG Tab, Take 1 tablet (20 mg total) by mouth once daily., Disp: 30 tablet, Rfl: 5    traMADol (ULTRAM) 50 mg tablet, Take 1 tablet (50 mg total) by mouth every 12 (twelve) hours as needed for Pain., Disp: 60 tablet, Rfl: 2    zolpidem (AMBIEN) 5 MG Tab, TAKE 1 TABLET BY MOUTH EVERY EVENING AS NEEDED, Disp: 30 tablet, Rfl: 3    zolpidem (AMBIEN) 5 MG Tab, TAKE 1 TABLET BY MOUTH EVERY EVENING AS NEEDED, Disp: 30 tablet, Rfl: 3      Review of Systems:        Physical Exam:      Vitals:    02/26/19 1031   BP: 134/80   Pulse: 68     alert and oriented ×4 follows commands answers all questions appropriately  Manual muscle test 5 out of 5 sensation to light touch grossly intact  No knee laxity  Antalgic gait  No knee effusion no clubbing cyanosis or edema   + tenderness cervical paraspinals   Nl gait  Dec cervical ROM  No C/C/E    Assessment:  Severe cervical spondylosis w. Recurrent muscular spasms   CKD 5 , CAD s/p stents s/p cabbg and anemia.  ESRD on dyalisis  TFCC tear awaiting surgical repair  Assessment:  TPI's to cervical paraspinals  F/u w. Ortho regarding wrist surgery        PROCEDURE NOTE:Risk and benefit of trigger  "point injections given to pt. Injections performed w. A" 25G needle after sterile prep w. chlorohexedine, verbal consent obtained . NO complications. B/l trapezius, splenius cap and lev scapulae were injected with a total of 3ML of 1% Lidocaine      "

## 2019-02-27 ENCOUNTER — TELEPHONE (OUTPATIENT)
Dept: PHYSICAL MEDICINE AND REHAB | Facility: CLINIC | Age: 79
End: 2019-02-27

## 2019-02-27 NOTE — TELEPHONE ENCOUNTER
----- Message from Nelson Jiang sent at 2/27/2019 12:47 PM CST -----  Type: Needs Medical Advice    Who Called:  Patient  Best Call Back Number: [phone] (home)   Additional Information: Patient would like a return call concerning knee - please call to advise.

## 2019-02-27 NOTE — TELEPHONE ENCOUNTER
Patient c/o of knee pain.  He states he would like an injection.  Offered an appt for Thursday 2/28/19, but has an appt in [place].  Nothing available next week.  Will call patient if anyone cancels on Thurs.

## 2019-02-28 ENCOUNTER — OFFICE VISIT (OUTPATIENT)
Dept: ORTHOPEDICS | Facility: CLINIC | Age: 79
End: 2019-02-28
Payer: MEDICARE

## 2019-02-28 VITALS — WEIGHT: 236 LBS | BODY MASS INDEX: 33.04 KG/M2 | HEIGHT: 71 IN

## 2019-02-28 DIAGNOSIS — M79.642 PAIN OF LEFT HAND: ICD-10-CM

## 2019-02-28 PROCEDURE — 99999 PR PBB SHADOW E&M-EST. PATIENT-LVL II: CPT | Mod: PBBFAC,,, | Performed by: ORTHOPAEDIC SURGERY

## 2019-02-28 PROCEDURE — 20605 PR DRAIN/INJECT INTERMEDIATE JOINT/BURSA: ICD-10-PCS | Mod: S$PBB,LT,, | Performed by: ORTHOPAEDIC SURGERY

## 2019-02-28 PROCEDURE — 99999 PR PBB SHADOW E&M-EST. PATIENT-LVL II: ICD-10-PCS | Mod: PBBFAC,,, | Performed by: ORTHOPAEDIC SURGERY

## 2019-02-28 PROCEDURE — 20605 DRAIN/INJ JOINT/BURSA W/O US: CPT | Mod: PBBFAC,PN | Performed by: ORTHOPAEDIC SURGERY

## 2019-02-28 PROCEDURE — 99203 OFFICE O/P NEW LOW 30 MIN: CPT | Mod: S$PBB,25,, | Performed by: ORTHOPAEDIC SURGERY

## 2019-02-28 PROCEDURE — 99212 OFFICE O/P EST SF 10 MIN: CPT | Mod: PBBFAC,PN,25 | Performed by: ORTHOPAEDIC SURGERY

## 2019-02-28 PROCEDURE — 99203 PR OFFICE/OUTPT VISIT, NEW, LEVL III, 30-44 MIN: ICD-10-PCS | Mod: S$PBB,25,, | Performed by: ORTHOPAEDIC SURGERY

## 2019-02-28 PROCEDURE — 20605 DRAIN/INJ JOINT/BURSA W/O US: CPT | Mod: S$PBB,LT,, | Performed by: ORTHOPAEDIC SURGERY

## 2019-02-28 RX ORDER — TRIAMCINOLONE ACETONIDE 40 MG/ML
20 INJECTION, SUSPENSION INTRA-ARTICULAR; INTRAMUSCULAR
Status: COMPLETED | OUTPATIENT
Start: 2019-02-28 | End: 2019-02-28

## 2019-02-28 RX ORDER — CELECOXIB 200 MG/1
200 CAPSULE ORAL DAILY
Qty: 30 CAPSULE | Refills: 3 | Status: SHIPPED | OUTPATIENT
Start: 2019-02-28 | End: 2019-10-11 | Stop reason: ALTCHOICE

## 2019-02-28 RX ADMIN — TRIAMCINOLONE ACETONIDE 20 MG: 40 INJECTION, SUSPENSION INTRA-ARTICULAR; INTRAMUSCULAR at 09:02

## 2019-02-28 NOTE — PROGRESS NOTES
INITIAL VISIT HISTORY:  A 78-year-old male presents for evaluation of left hand   and arm symptoms.  He is referred for his left wrist and actually comes with an   MRI, which shows diffuse arthritic changes and a tear of the TFC, which is   degenerative, but he is also reporting numbness and tingling in the left hand   and he has had a nerve conduction study recently, which showed evidence of left   carpal tunnel syndrome as well as left cubital tunnel syndrome.    He does receive dialysis in his left arm with a shunt.    PAST MEDICAL HISTORY:  Significant for kidney disease, arthritis, BPH, renal   failure, GERD, gout, hypertension, hyperlipidemia and syncope.    PAST SURGICAL HISTORY:  Includes shoulder surgery, appendectomy, heart   catheterization, joint replacement and a colonoscopy and shunt placement, left   arm.    FAMILY HISTORY:  Positive for hypertension, cancer and heart disease.    SOCIAL HISTORY:  The patient does not smoke or drink.    REVIEW OF SYSTEMS:  Negative fever, chills, rashes.    CURRENT MEDICATIONS:  Reviewed on chart.    ALLERGIES:  ACE inhibitors, eplerenone and sulfa.    PHYSICAL EXAMINATION:  GENERAL:  Well-developed, well-nourished male in no acute distress, alert and   oriented x3.  MUSCULOSKELETAL:  Examination of upper extremities significant for the left hand   and arm, left hand demonstrate some mild swelling, tenderness over the DRUJ and   the ulnar side of the wrist.  Supination is limited secondary to pain.    Pronation is full.  Good flexion and extension.  He has some tenderness at the   base of the left thumb where he has some bony enlargement at the CMC joint.    Positive Tinel sign at the wrist.  Positive Tinel sign at the left elbow over   the ulnar nerve.    He does have a shunt in his left antecubital fossa.    IMAGING:  X-rays, AP and lateral, left hand reviewed, demonstrate arthritic   change, which is severe at the CMC joint and also involving the DRUJ.    MRI  reviewed as well.    IMPRESSION:  1.  DRUJ arthritis, left wrist.  2.  CMC arthritis, left thumb, severe.  3.  Left carpal tunnel syndrome.  4.  Left cubital tunnel syndrome.    PLAN:  I explained the nature of these problems to the patient and how they were   unrelated, but probably combining to cause pain in the left hand and arm   including the numbness.    I am suspicious that he is probably having some steal syndrome related to the   shunt in the left arm causing the neurologic symptoms to be worse.    I think we might consider surgery for these issues, but I really want to try to   figure out which is bothering him the most.  I think the nerve could be done at   the same time at the elbow and wrist, but the DRUJ would be a different deal as   well as the CMC.    For treatment today, I have recommended that we try an injection.  After pause   for timeout, he identified the left wrist injected, DRUJ with combination of   Kenalog 20 mg, 0.5 mL Xylocaine, sterile technique.  He tolerated the procedure   well without complication.    I have also recommended that he go back to using his wrist brace at night, try   to avoid pressure on the left elbow and we will start him on Celebrex 200 mg   once a day with food.  Follow up in one month for a recheck.      LYDIA  dd: 02/28/2019 09:48:14 (CST)  td: 03/01/2019 04:00:22 (TERRELL)  Doc ID   #[id number]  Job ID #[id number]    CC:

## 2019-02-28 NOTE — LETTER
February 28, 2019        James Álvarez MD  [address] [place] [address] [address]  [place] [address]             [facility]  [address] [place] [address] [address]  [place] [address]  Phone: [phone]   Patient: Micheal Hunt   MR Number: [id number]   YOB: 1940   Date of Visit: 2/28/2019       Dear Dr. Álvarez:    Thank you for referring Micheal Hunt to me for evaluation. Below are the relevant portions of my assessment and plan of care.            If you have questions, please do not hesitate to call me. I look forward to following Micheal along with you.    Sincerely,      Marino Raya Jr., MD           CC  No Recipients

## 2019-03-04 RX ORDER — ISOSORBIDE MONONITRATE 10 MG/1
TABLET ORAL
Qty: 30 TABLET | Refills: 3 | Status: ON HOLD | OUTPATIENT
Start: 2019-03-04 | End: 2019-04-08 | Stop reason: HOSPADM

## 2019-03-10 NOTE — ED NOTES
Attending Attestation





- Resident


Resident Name: Rui Barrientos





- ED Attending Attestation


I have performed the following: I have examined & evaluated the patient, The 

case was reviewed & discussed with the resident, I agree w/resident's findings 

& plan, Exceptions are as noted





- HPI


HPI: 





03/10/19 18:00


28 F with no PMH presenting with epigastric pain and vomiting x 3 days. Pt 

reports onset of pain after eating. Denies any F/C. Endorses a few episodes of 

NBNB vomiting. Denies lower abdominal pain. Denies vaginal bleeding/discharge. 

No flank pain. Denies CP/SOB.





- Physicial Exam


PE: 





03/10/19 18:01


GENERAL: Awake, alert, and fully oriented, in no acute distress.


HEAD: No signs of trauma


EYES: PERRLA, EOMI, sclera anicteric, conjunctiva clear


ENT: Auricles normal inspection, hearing grossly normal, nares patent, 

oropharynx clear without exudates. Moist mucosa


NECK: Nontender, no stepoffs, Normal ROM, supple, no lymphadenopathy, JVD, or 

masses


LUNGS: Breath sounds equal, clear to auscultation bilaterally.  No wheezes, and 

no crackles


HEART: Regular rate and rhythm, normal S1 and S2, no murmurs, rubs or gallops


ABDOMEN: Soft, mild epigastric TTP, normoactive bowel sounds.  No guarding, no 

rebound.  No masses


EXTREMITIES: Normal range of motion, no edema.  No clubbing or cyanosis. No 

cords, erythema, or tenderness


NEUROLOGICAL: Cranial nerves II through XII intact. 5/5 strength and sensation 

in all extremities, Normal speech, normal gait, normal cerebellar function


SKIN: Warm, Dry, normal turgor, no rashes or lesions noted.





- Medical Decision Making





03/10/19 18:01


28 F with epigastric pain, N+V. Pt with negative rudolph's. No lower abdominal 

tenderness. Suspect gastritis vs PUD.


- Labs, lipase


- UA, UPT


- GI cocktail





03/10/19 18:06


Labs wnl


Pt reassessed now with significant improvement with GI meds.


Repeat abdominal exam improved.


Pt is well appearing, with normal vitals. Clinically stable for DC at this time.


I discussed the physical exam findings, ancillary test results and final 

diagnoses with the patient. I answered all of the patient's questions. The 

patient was satisfied with the care received and felt comfortable with the 

discharge plan and treatment plan.  The patient agrees to follow up with the 

primary care physician within 24-72 hours. VSS. Pt in NAD. Will monitor.

## 2019-03-11 ENCOUNTER — TELEPHONE (OUTPATIENT)
Dept: PULMONOLOGY | Facility: CLINIC | Age: 79
End: 2019-03-11

## 2019-03-11 ENCOUNTER — HOSPITAL ENCOUNTER (INPATIENT)
Facility: HOSPITAL | Age: 79
LOS: 4 days | Discharge: HOME HEALTH | DRG: 202 | End: 2019-03-15
Attending: INTERNAL MEDICINE | Admitting: INTERNAL MEDICINE
Payer: MEDICARE

## 2019-03-11 ENCOUNTER — HOSPITAL ENCOUNTER (OUTPATIENT)
Dept: RADIOLOGY | Facility: HOSPITAL | Age: 79
Discharge: HOME | DRG: 202 | End: 2019-03-11
Attending: NURSE PRACTITIONER
Payer: MEDICARE

## 2019-03-11 ENCOUNTER — OFFICE VISIT (OUTPATIENT)
Dept: PULMONOLOGY | Facility: CLINIC | Age: 79
End: 2019-03-11
Payer: MEDICARE

## 2019-03-11 VITALS
BODY MASS INDEX: 32.61 KG/M2 | HEART RATE: 82 BPM | WEIGHT: 246.06 LBS | HEIGHT: 73 IN | OXYGEN SATURATION: 97 % | DIASTOLIC BLOOD PRESSURE: 79 MMHG | SYSTOLIC BLOOD PRESSURE: 141 MMHG

## 2019-03-11 DIAGNOSIS — I48.91 ATRIAL FIBRILLATION: Primary | ICD-10-CM

## 2019-03-11 DIAGNOSIS — J18.0 LOBULAR PNEUMONIA: Primary | ICD-10-CM

## 2019-03-11 DIAGNOSIS — J45.31 MILD PERSISTENT ASTHMA WITH ACUTE EXACERBATION: ICD-10-CM

## 2019-03-11 DIAGNOSIS — D84.9 IMMUNE DEFICIENCY DISORDER: ICD-10-CM

## 2019-03-11 DIAGNOSIS — J18.9 PNEUMONIA OF BOTH LUNGS DUE TO INFECTIOUS ORGANISM, UNSPECIFIED PART OF LUNG: ICD-10-CM

## 2019-03-11 DIAGNOSIS — R07.9 CHEST PAIN: ICD-10-CM

## 2019-03-11 DIAGNOSIS — J45.901 ASTHMA EXACERBATION: ICD-10-CM

## 2019-03-11 PROBLEM — I70.0 AORTIC CALCIFICATION: Status: ACTIVE | Noted: 2019-03-11

## 2019-03-11 LAB
ALBUMIN SERPL BCP-MCNC: 3.5 G/DL
ALP SERPL-CCNC: 95 U/L
ALT SERPL W/O P-5'-P-CCNC: 113 U/L
ANION GAP SERPL CALC-SCNC: 15 MMOL/L
AST SERPL-CCNC: 49 U/L
BACTERIA #/AREA URNS HPF: NORMAL /HPF
BILIRUB SERPL-MCNC: 0.5 MG/DL
BILIRUB UR QL STRIP: NEGATIVE
BUN SERPL-MCNC: 98 MG/DL
CALCIUM SERPL-MCNC: 7.6 MG/DL
CHLORIDE SERPL-SCNC: 100 MMOL/L
CLARITY UR: CLEAR
CO2 SERPL-SCNC: 23 MMOL/L
COLOR UR: YELLOW
CREAT SERPL-MCNC: 5.8 MG/DL
EST. GFR  (AFRICAN AMERICAN): 10 ML/MIN/1.73 M^2
EST. GFR  (NON AFRICAN AMERICAN): 9 ML/MIN/1.73 M^2
ESTIMATED AVG GLUCOSE: 169 MG/DL
GLUCOSE SERPL-MCNC: 258 MG/DL
GLUCOSE UR QL STRIP: ABNORMAL
HBA1C MFR BLD HPLC: 7.5 %
HGB UR QL STRIP: ABNORMAL
HYALINE CASTS #/AREA URNS LPF: 0 /LPF
KETONES UR QL STRIP: NEGATIVE
LEUKOCYTE ESTERASE UR QL STRIP: NEGATIVE
MAGNESIUM SERPL-MCNC: 2 MG/DL
MICROSCOPIC COMMENT: NORMAL
NITRITE UR QL STRIP: NEGATIVE
PH UR STRIP: 6 [PH] (ref 5–8)
PHOSPHATE SERPL-MCNC: 4.4 MG/DL
POTASSIUM SERPL-SCNC: 4.1 MMOL/L
PROT SERPL-MCNC: 5.9 G/DL
PROT UR QL STRIP: ABNORMAL
RBC #/AREA URNS HPF: 4 /HPF (ref 0–4)
SODIUM SERPL-SCNC: 138 MMOL/L
SP GR UR STRIP: 1.01 (ref 1–1.03)
SQUAMOUS #/AREA URNS HPF: 1 /HPF
T4 FREE SERPL-MCNC: 0.73 NG/DL
TSH SERPL DL<=0.005 MIU/L-ACNC: 0.11 UIU/ML
URN SPEC COLLECT METH UR: ABNORMAL
UROBILINOGEN UR STRIP-ACNC: NEGATIVE EU/DL
WBC #/AREA URNS HPF: 0 /HPF (ref 0–5)
YEAST URNS QL MICRO: NORMAL

## 2019-03-11 PROCEDURE — 36415 COLL VENOUS BLD VENIPUNCTURE: CPT

## 2019-03-11 PROCEDURE — 25000003 PHARM REV CODE 250: Performed by: INTERNAL MEDICINE

## 2019-03-11 PROCEDURE — 93005 ELECTROCARDIOGRAM TRACING: CPT

## 2019-03-11 PROCEDURE — 80074 ACUTE HEPATITIS PANEL: CPT

## 2019-03-11 PROCEDURE — 25000242 PHARM REV CODE 250 ALT 637 W/ HCPCS: Performed by: INTERNAL MEDICINE

## 2019-03-11 PROCEDURE — 99213 OFFICE O/P EST LOW 20 MIN: CPT | Mod: PBBFAC,25,PO | Performed by: NURSE PRACTITIONER

## 2019-03-11 PROCEDURE — 81000 URINALYSIS NONAUTO W/SCOPE: CPT

## 2019-03-11 PROCEDURE — 99214 OFFICE O/P EST MOD 30 MIN: CPT | Mod: S$PBB,,, | Performed by: NURSE PRACTITIONER

## 2019-03-11 PROCEDURE — 99999 PR PBB SHADOW E&M-EST. PATIENT-LVL III: ICD-10-PCS | Mod: PBBFAC,,, | Performed by: NURSE PRACTITIONER

## 2019-03-11 PROCEDURE — 99214 PR OFFICE/OUTPT VISIT, EST, LEVL IV, 30-39 MIN: ICD-10-PCS | Mod: S$PBB,,, | Performed by: NURSE PRACTITIONER

## 2019-03-11 PROCEDURE — 84443 ASSAY THYROID STIM HORMONE: CPT

## 2019-03-11 PROCEDURE — 99999 PR PBB SHADOW E&M-EST. PATIENT-LVL III: CPT | Mod: PBBFAC,,, | Performed by: NURSE PRACTITIONER

## 2019-03-11 PROCEDURE — 83036 HEMOGLOBIN GLYCOSYLATED A1C: CPT

## 2019-03-11 PROCEDURE — 99223 PR INITIAL HOSPITAL CARE,LEVL III: ICD-10-PCS | Mod: ,,, | Performed by: INTERNAL MEDICINE

## 2019-03-11 PROCEDURE — 99223 1ST HOSP IP/OBS HIGH 75: CPT | Mod: ,,, | Performed by: INTERNAL MEDICINE

## 2019-03-11 PROCEDURE — 25000003 PHARM REV CODE 250: Performed by: NURSE PRACTITIONER

## 2019-03-11 PROCEDURE — 12000002 HC ACUTE/MED SURGE SEMI-PRIVATE ROOM

## 2019-03-11 PROCEDURE — 71046 XR CHEST PA AND LATERAL: ICD-10-PCS | Mod: 26,,, | Performed by: RADIOLOGY

## 2019-03-11 PROCEDURE — 83735 ASSAY OF MAGNESIUM: CPT

## 2019-03-11 PROCEDURE — 94640 AIRWAY INHALATION TREATMENT: CPT

## 2019-03-11 PROCEDURE — 84100 ASSAY OF PHOSPHORUS: CPT

## 2019-03-11 PROCEDURE — 71046 X-RAY EXAM CHEST 2 VIEWS: CPT | Mod: TC,FY

## 2019-03-11 PROCEDURE — 94761 N-INVAS EAR/PLS OXIMETRY MLT: CPT

## 2019-03-11 PROCEDURE — 84439 ASSAY OF FREE THYROXINE: CPT

## 2019-03-11 PROCEDURE — 80100016 HC MAINTENANCE HEMODIALYSIS

## 2019-03-11 PROCEDURE — 71046 X-RAY EXAM CHEST 2 VIEWS: CPT | Mod: 26,,, | Performed by: RADIOLOGY

## 2019-03-11 PROCEDURE — 63600175 PHARM REV CODE 636 W HCPCS: Performed by: INTERNAL MEDICINE

## 2019-03-11 PROCEDURE — 80053 COMPREHEN METABOLIC PANEL: CPT

## 2019-03-11 PROCEDURE — 99900035 HC TECH TIME PER 15 MIN (STAT)

## 2019-03-11 RX ORDER — ONDANSETRON 2 MG/ML
4 INJECTION INTRAMUSCULAR; INTRAVENOUS EVERY 8 HOURS PRN
Status: DISCONTINUED | OUTPATIENT
Start: 2019-03-11 | End: 2019-03-15 | Stop reason: HOSPADM

## 2019-03-11 RX ORDER — POTASSIUM CHLORIDE 20 MEQ/15ML
40 SOLUTION ORAL
Status: DISCONTINUED | OUTPATIENT
Start: 2019-03-11 | End: 2019-03-15 | Stop reason: HOSPADM

## 2019-03-11 RX ORDER — SODIUM,POTASSIUM PHOSPHATES 280-250MG
2 POWDER IN PACKET (EA) ORAL
Status: DISCONTINUED | OUTPATIENT
Start: 2019-03-11 | End: 2019-03-15 | Stop reason: HOSPADM

## 2019-03-11 RX ORDER — GLUCAGON 1 MG
1 KIT INJECTION
Status: DISCONTINUED | OUTPATIENT
Start: 2019-03-11 | End: 2019-03-15 | Stop reason: HOSPADM

## 2019-03-11 RX ORDER — IBUPROFEN 200 MG
24 TABLET ORAL
Status: DISCONTINUED | OUTPATIENT
Start: 2019-03-11 | End: 2019-03-15 | Stop reason: HOSPADM

## 2019-03-11 RX ORDER — SODIUM CHLORIDE 0.9 % (FLUSH) 0.9 %
5 SYRINGE (ML) INJECTION
Status: DISCONTINUED | OUTPATIENT
Start: 2019-03-11 | End: 2019-03-15 | Stop reason: HOSPADM

## 2019-03-11 RX ORDER — FINASTERIDE 5 MG/1
5 TABLET, FILM COATED ORAL DAILY
Status: DISCONTINUED | OUTPATIENT
Start: 2019-03-12 | End: 2019-03-15 | Stop reason: HOSPADM

## 2019-03-11 RX ORDER — ISOSORBIDE MONONITRATE 10 MG/1
10 TABLET ORAL NIGHTLY
Status: DISCONTINUED | OUTPATIENT
Start: 2019-03-11 | End: 2019-03-15 | Stop reason: HOSPADM

## 2019-03-11 RX ORDER — LANOLIN ALCOHOL/MO/W.PET/CERES
800 CREAM (GRAM) TOPICAL
Status: DISCONTINUED | OUTPATIENT
Start: 2019-03-11 | End: 2019-03-15 | Stop reason: HOSPADM

## 2019-03-11 RX ORDER — GABAPENTIN 300 MG/1
300 CAPSULE ORAL NIGHTLY
Status: DISCONTINUED | OUTPATIENT
Start: 2019-03-11 | End: 2019-03-15 | Stop reason: HOSPADM

## 2019-03-11 RX ORDER — POLYETHYLENE GLYCOL 3350 17 G/17G
17 POWDER, FOR SOLUTION ORAL DAILY PRN
Status: DISCONTINUED | OUTPATIENT
Start: 2019-03-11 | End: 2019-03-15 | Stop reason: HOSPADM

## 2019-03-11 RX ORDER — SODIUM CHLORIDE 9 MG/ML
INJECTION, SOLUTION INTRAVENOUS ONCE
Status: CANCELLED | OUTPATIENT
Start: 2019-03-11 | End: 2019-03-11

## 2019-03-11 RX ORDER — ATORVASTATIN CALCIUM 40 MG/1
80 TABLET, FILM COATED ORAL DAILY
Status: DISCONTINUED | OUTPATIENT
Start: 2019-03-12 | End: 2019-03-15 | Stop reason: HOSPADM

## 2019-03-11 RX ORDER — METHYLPREDNISOLONE SOD SUCC 125 MG
62.5 VIAL (EA) INJECTION EVERY 8 HOURS
Status: DISCONTINUED | OUTPATIENT
Start: 2019-03-11 | End: 2019-03-14

## 2019-03-11 RX ORDER — TAMSULOSIN HYDROCHLORIDE 0.4 MG/1
1 CAPSULE ORAL DAILY
Status: DISCONTINUED | OUTPATIENT
Start: 2019-03-12 | End: 2019-03-12

## 2019-03-11 RX ORDER — HYDROCODONE BITARTRATE AND ACETAMINOPHEN 5; 325 MG/1; MG/1
1 TABLET ORAL EVERY 6 HOURS PRN
Status: DISCONTINUED | OUTPATIENT
Start: 2019-03-11 | End: 2019-03-15 | Stop reason: HOSPADM

## 2019-03-11 RX ORDER — ASPIRIN 81 MG/1
81 TABLET ORAL DAILY
Status: DISCONTINUED | OUTPATIENT
Start: 2019-03-12 | End: 2019-03-15 | Stop reason: HOSPADM

## 2019-03-11 RX ORDER — ALLOPURINOL 100 MG/1
100 TABLET ORAL DAILY
Status: DISCONTINUED | OUTPATIENT
Start: 2019-03-12 | End: 2019-03-15 | Stop reason: HOSPADM

## 2019-03-11 RX ORDER — IPRATROPIUM BROMIDE AND ALBUTEROL SULFATE 2.5; .5 MG/3ML; MG/3ML
3 SOLUTION RESPIRATORY (INHALATION) EVERY 6 HOURS PRN
Status: DISCONTINUED | OUTPATIENT
Start: 2019-03-11 | End: 2019-03-13

## 2019-03-11 RX ORDER — IBUPROFEN 200 MG
16 TABLET ORAL
Status: DISCONTINUED | OUTPATIENT
Start: 2019-03-11 | End: 2019-03-15 | Stop reason: HOSPADM

## 2019-03-11 RX ORDER — HEPARIN SODIUM 5000 [USP'U]/ML
5000 INJECTION, SOLUTION INTRAVENOUS; SUBCUTANEOUS EVERY 12 HOURS
Status: DISCONTINUED | OUTPATIENT
Start: 2019-03-11 | End: 2019-03-12

## 2019-03-11 RX ORDER — SODIUM CHLORIDE 9 MG/ML
INJECTION, SOLUTION INTRAVENOUS
Status: CANCELLED | OUTPATIENT
Start: 2019-03-11

## 2019-03-11 RX ORDER — ACETAMINOPHEN 325 MG/1
650 TABLET ORAL EVERY 6 HOURS PRN
Status: DISCONTINUED | OUTPATIENT
Start: 2019-03-11 | End: 2019-03-15 | Stop reason: HOSPADM

## 2019-03-11 RX ORDER — PANTOPRAZOLE SODIUM 40 MG/1
40 TABLET, DELAYED RELEASE ORAL DAILY
Status: DISCONTINUED | OUTPATIENT
Start: 2019-03-11 | End: 2019-03-15 | Stop reason: HOSPADM

## 2019-03-11 RX ORDER — HYDRALAZINE HYDROCHLORIDE 20 MG/ML
10 INJECTION INTRAMUSCULAR; INTRAVENOUS EVERY 6 HOURS PRN
Status: DISCONTINUED | OUTPATIENT
Start: 2019-03-11 | End: 2019-03-15 | Stop reason: HOSPADM

## 2019-03-11 RX ADMIN — AZITHROMYCIN MONOHYDRATE 500 MG: 500 INJECTION, POWDER, LYOPHILIZED, FOR SOLUTION INTRAVENOUS at 07:03

## 2019-03-11 RX ADMIN — METHYLPREDNISOLONE SODIUM SUCCINATE 62.5 MG: 125 INJECTION, POWDER, FOR SOLUTION INTRAMUSCULAR; INTRAVENOUS at 01:03

## 2019-03-11 RX ADMIN — PANTOPRAZOLE SODIUM 40 MG: 40 TABLET, DELAYED RELEASE ORAL at 01:03

## 2019-03-11 RX ADMIN — METHYLPREDNISOLONE SODIUM SUCCINATE 62.5 MG: 125 INJECTION, POWDER, FOR SOLUTION INTRAMUSCULAR; INTRAVENOUS at 09:03

## 2019-03-11 RX ADMIN — CEFTRIAXONE 1 G: 1 INJECTION, SOLUTION INTRAVENOUS at 06:03

## 2019-03-11 RX ADMIN — ACETAMINOPHEN 650 MG: 325 TABLET ORAL at 02:03

## 2019-03-11 RX ADMIN — HEPARIN SODIUM 5000 UNITS: 5000 INJECTION, SOLUTION INTRAVENOUS; SUBCUTANEOUS at 08:03

## 2019-03-11 RX ADMIN — GABAPENTIN 300 MG: 300 CAPSULE ORAL at 08:03

## 2019-03-11 RX ADMIN — ISOSORBIDE MONONITRATE 10 MG: 10 TABLET ORAL at 09:03

## 2019-03-11 RX ADMIN — IPRATROPIUM BROMIDE AND ALBUTEROL SULFATE 3 ML: .5; 3 SOLUTION RESPIRATORY (INHALATION) at 02:03

## 2019-03-11 RX ADMIN — HEPARIN SODIUM 5000 UNITS: 5000 INJECTION, SOLUTION INTRAVENOUS; SUBCUTANEOUS at 01:03

## 2019-03-11 NOTE — PLAN OF CARE
Problem: Adult Inpatient Plan of Care  Goal: Plan of Care Review  Outcome: Ongoing (interventions implemented as appropriate)  Pt alert and oriented. Tolerating diet. Dialysis done. Tele on. Plan of care reviewed with patient. Safety maintained. Iv steroids given. Educated on medications. Will continue to monitor,

## 2019-03-11 NOTE — PROGRESS NOTES
03/11/2019      Admit Date: 3/11/2019  Micheal Hunt  New Patient Consult    No chief complaint on file.  cc sob, cough , weakness.    History of Present Illness:   Pt relates had  4 pneumonias last yr, uses prednisone freq although feel repsonse is not dramatic.  Pt has cough with yellow mucous commonly with pneumonias and diffuse weakness.  Pt was on abx/prednisone last wk but worsened ppt presentation.  Pt is on chr dialysis.   Pt admitted earlier today - feels better on rocephin and azithro.    From consult done in November 2018----History of Present Illness:   Patient has been seen by me in February 2018.  Patient worked as a rancher and cottrell.  He lives in [place].  He has been exposed to hay in the past.  Has had sinus problems.  He was seen by immunology in the past.  His pneumococcal titers were low.  His IgM was low in his IgG was good. He had not had recurrent pneumonias when I saw him in February.  Patient does have a history of coughing and wheezing but not short of breath. This pulmonary functions done in January showed an FEV1 of 71%.  Patient was felt to have a mild persistent asthma problem and was recommended to use Symbicort.  He was also felt to have an immune deficiency problem and was recommended to use amoxicillin for any infections.     Patient was hospitalized with pneumonia for a couple of days over the summer.  Patient respiratory wise has been doing fairly well.     Patient came home yesterday and then had an acute chill with fever.  He had headache but no nausea or vomiting or diarrhea.  No chest pain did have some cough.  He presented to the emergency room acutely.  He did not take any antibiotics.  Patient had a CT scan did not demonstrate left lower lung pneumonia.  The radiology interpretation of his CT scan suggested  a pulmonary artery aneurysm.  I reviewed the CT scan with radiologist this a.m. and there is no pulmonary artery aneurysm .  Patient is feeling much  better today.  Patient did bring up 2 tsp of blood yesterday.  Has not had hemoptysis in the past.  Patient denies taking anticoagulant therapy other than aspirin.     Patient has had both pneumonia vaccines Prevnar and Pneumovax.  Pneumococcal titers showed 9/14 were not protected in April of last year.      PFSH:  Past Medical History:   Diagnosis Date    NICK (acute kidney injury) 7/29/2016    Allergy     Anemia, mild 12/15/2014    Arthritis     Gout    Benign essential HTN 3/27/2012    BMI 29.0-29.9,adult 5/10/2018    BPH (benign prostatic hyperplasia)     BPH (benign prostatic hyperplasia)     CAD (coronary artery disease) 2006    Chronic kidney disease     due to ibuprofen    Colon polyp     CRF (chronic renal failure), stage 5     Diverticulosis     Gastritis     GERD (gastroesophageal reflux disease)     Gout     History of colon polyps 5/3/2018    HTN (hypertension) 3/27/2012    Hyperlipidemia     Hyperlipidemia     LLL pneumonia 6/14/2018    LVH (left ventricular hypertrophy)     Mesenteric ischemia     Murmur, cardiac 3/27/2012    GRICELDA (obstructive sleep apnea)     DOES NOT USE A MACHINE    Sinus problem     Syncope and collapse      Past Surgical History:   Procedure Laterality Date    APPENDECTOMY      BLOCK-NERVE Left 5/31/2016    [facility]    CARDIAC CATHETERIZATION      COLONOSCOPY  2011    COLONOSCOPY N/A 5/3/2018    [facility]O    COLONOSCOPY N/A 9/10/2015    [facility]    CORONARY ARTERY BYPASS GRAFT  4/2007    x 1    CYSTOSCOPY N/A 8/30/2017    [facility]    CYSTOSCOPY N/A 11/10/2015    [facility] at [facility]    ESOPHAGOGASTRODUODENOSCOPY (EGD) N/A 1/4/2016    [facility]formed [facility] at [facility] (2ND FLR)    ESOPHAGOGASTRODUODENOSCOPY (EGD) N/A 10/15/2014    [facility] at  [facility]    INJECTION-STEROID-EPIDURAL-TRANSFORAMINAL Left 2016    [facility]    JOINT REPLACEMENT      left knee total replacement  X 3    mid leftt finger      from a cactuss    MOLE REMOVAL  2016    RADIOFREQUENCY THERMOCOAGULATION (RFTC)-NERVE-MEDIAN BRANCH-LUMBAR Left 2016    [facility]    rotative cuff      no rotative cuffs on bilat shoulders has pins     SHOULDER SURGERY      shoulder surgery bilat  RIGHT X 4; LEFT X 3    TRANSRECTAL ULTRASOUND GUIDED PROSTATE BIOPSY Bilateral 11/10/2015    [facility] OR    ULTRASOUND-ENDOSCOPIC-UPPER N/A 2017    [facility] (2ND FLR)    ULTRASOUND-ENDOSCOPIC-UPPER N/A 2016    [facility] at [facility] ENDO (2ND FLR)    ULTRASOUND-ENDOSCOPIC-UPPER N/A 2015    Performed by Jason [facility]em [facility] at [facility] ENDO (2ND FLR)     Social History     Tobacco Use    Smoking status: Never Smoker    Smokeless tobacco: Never Used   Substance Use Topics    Alcohol use: No    Drug use: No     Family History   Problem Relation Age of Onset    Heart disease Mother     Sudden death Father     Cancer Father         advanced lung ca- found after 2 story fall    Stroke Sister     Pneumonia Sister          from PNA    Heart disease Sister     Hypertension Brother     Kidney cancer Neg Hx     Prostate cancer Neg Hx     Urolithiasis Neg Hx     Allergic rhinitis Neg Hx     Allergies Neg Hx     Angioedema Neg Hx     Asthma Neg Hx     Atopy Neg Hx     Eczema Neg Hx     Immunodeficiency Neg Hx     Rhinitis Neg Hx     Urticaria Neg Hx      Review of patient's allergies indicates:   Allergen Reactions    Ace inhibitors Other (See Comments)     Cough    Arb-angiotensin receptor antagonist Itching    Eplerenone Other (See Comments)     Marked bradycardia, 40, tiredness and weakness      Sulfa (sulfonamide  antibiotics) Itching     Patient says this was 10 years ago and doesn't remember what happened       Performance Status:Performance Status:The patient's activity level is housebound activities.    Review of Systems:  a review of eleven systems covering constitutional, Psych, Eye, HEENT, Respiratory, Cardiac, GI, , Musculoskeletal, Endocrine, Dermatologicwas negative except the above mentioned abnormalities and for any pertinent findings as listed below: pertinent positive as above, rest is good         Exam:Comprehensive exam done. /60 (BP Location: Right arm, Patient Position: Sitting)   Pulse 79   Temp 96.8 °F (36 °C) (Tympanic)   Resp 18   Wt 108.1 kg (238 lb 5.1 oz)   SpO2 99%   BMI 31.44 kg/m²   Exam included Vitals as listed, and patient's appearance and affect and alertness and mood, oral exam for yeast and hygiene and pharynx lesions and Mallapatti (M) score, neck with inspection for jvd and masses and thyroid abnormalities and lymph nodes (supraclavicular and infraclavicular nodes also examined and noted if abn), chest exam included symmetry and effort and fremitus and percussion and auscultation, cardiac exam included rhythm and gallops and murmur and rubs and jvd and edema, abdominal exam for mass and hepatosplenomegaly and tenderness and hernias and bowel sounds, Musculoskeletal exam with muscle tone and posture and mobility/gait and  strenght, and skin for rashes and cyanosis and pallor and turgor, extremity for clubbing.  Findings were normal except as listed below:  M3,chest is symmetric, no distress, normal percussion, normal fremitus and mild bilat rhonchi/wheezes  No edema, no jvd    Radiographs reviewed: view by direct vision - min infiltrate left lower lung  Results for orders placed during the hospital encounter of 11/05/18   X-Ray Chest 1 View    Narrative EXAMINATION:  XR CHEST 1 VIEW    CLINICAL HISTORY:  cough;    TECHNIQUE:  Single frontal view of the chest was  performed.    COMPARISON:  11/5/2018    FINDINGS:  The cardiomediastinal silhouette is stable..  There are median sternotomy sutures.  Thoracic aorta is tortuous    The left lower lobe infiltrate with best seen on the lateral view on the prior exam and follow-up to include lateral view is suggested.  As visualized on the frontal view it appears decreased.    Right lung remains clear no effusion.      Impression Decrease of the left basilar infiltrate compared to the prior exam.  Suggest follow-up study to include lateral view as it is best seen on the lateral view.      Electronically signed by: Mayuri Castro MD  Date:    11/08/2018  Time:    14:19   echo 11/7/18  Conclusion     · The left ventricle cavity is mildly dilated.  · Mild-to-moderate aortic regurgitation.  · Left ventricle ejection fraction is low normal at 51%  · Left ventricle shows mild concentric hypertrophy.  · RV systolic function is normal.  · Left atrium is moderately dilated.  · Moderate aortic valve stenosis.  · Aortic valve area is 1.30 cm2; peak velocity is 2.96 m/s; mean gradient is 21.48 mmHg.  · Right atrium is mildly dilated.  · Mild mitral regurgitation.  · Normal central venous pressure (3 mm Hg).  · The estimated PA systolic pressure is 27.21 mm Hg  · No definite change from Echo in 6/2018        Labs     No results for input(s): WBC, HGB, HCT, PLT, BAND, METAMYELOCYT, MYELOPCT, HGBA1C in the last 24 hours.  Recent Labs   Lab 03/11/19  1109      K 4.1      CO2 23   BUN 98*   CREATININE 5.8*   *   CALCIUM 7.6*   MG 2.0   PHOS 4.4   AST 49*   *   ALKPHOS 95   BILITOT 0.5   PROT 5.9*   ALBUMIN 3.5   No results for input(s): PH, PCO2, PO2, HCO3 in the last 24 hours.  Microbiology Results (last 7 days)     Procedure Component Value Units Date/Time    Culture, Respiratory [[id number]]     Order Status:  No result Specimen:  Respiratory from Sputum, Expectorated           Impression:  Active Hospital Problems     Diagnosis  POA    Asthma exacerbation [J45.901]  Yes    Pneumonia of left lower lobe due to infectious organism [J18.1]  Yes    Immune deficiency disorder [D84.9]  Yes    Aortic calcification [I70.0]  Yes     Defer to primary control of cholesterol and bp.            Resolved Hospital Problems   No resolved problems to display.               Plan: pt seems better already. Needs to have f/u immune deficiency.  Asthma control is vague.  Check procalcitonin.  Steroids,abx, bd, needs ig eval and humoral antibody titers outpt.

## 2019-03-11 NOTE — NURSING
Pt arrived to the floor, VSS, complaining of SOB with exertion. notified Dr. Olivas of patient arrival.

## 2019-03-11 NOTE — PATIENT INSTRUCTIONS
Breathing not improved with outpatient therapy. Chest x-ray shows sign of possible pneumonia.   Will need hospitalization for further evaluation and treatment.

## 2019-03-11 NOTE — H&P
PCP: Pk Lakhani MD    History & Physical    Chief Complaint:  Persistent asthma exacerbation    History of Present Illness:  Patient is a 78 y.o. male admitted to Hospitalist Service from Dr. Guillen's office with complaint of persisting asthma exacerbation. Patient reportedly has past medical history significant for bronchial asthma, hypertension, end-stage renal disease (on hemodialysis Monday/Wednesday/Friday), coronary artery disease, benign prostatic hypertrophy, gastroesophageal reflux disease, history of gout hyperlipidemia, obstructive sleep apnea (not on any CPAP).  Reportedly patient has been having lingering symptoms of shortness of breath, wheezing.  Lately patient is getting increasingly weak and dizzy.  Cough is persisting which is mostly nonproductive.  Patient is compliant with his routine hemodialysis (on Monday, Wednesday, Friday).  Patient has recently completed 1st azithromycin than oral Levaquin antibiotic therapy.  Presently taking oral Cipro.  Patient has received intramuscular steroid shot and prednisone taper course.  Patient is requiring increasing use/frequency of nebulizers and inhalers despite no significant improvement.  The patient denies any sick contact or recent travel history.  Patient traveled to [place] in the month of February.  Expect duration is mostly clear. Patient denied chest pain, abdominal pain, nausea, vomiting, headache, vision changes, focal neuro-deficits, cough or fever.  Patient denied any orthopnea or chest pain. No calf tenderness reported.  Patient also following with Dr. Aguilera from Allergy/immunology Department.  Patient was hospitalized in November 2018 for pneumonia evaluation and management.    Past Medical History:   Diagnosis Date    NICK (acute kidney injury) 7/29/2016    Allergy     Anemia, mild 12/15/2014    Arthritis     Gout    Benign essential HTN 3/27/2012    BMI 29.0-29.9,adult 5/10/2018    BPH (benign prostatic hyperplasia)     BPH  (benign prostatic hyperplasia)     CAD (coronary artery disease) 2006    Chronic kidney disease     due to ibuprofen    Colon polyp     CRF (chronic renal failure), stage 5     Diverticulosis     Gastritis     GERD (gastroesophageal reflux disease)     Gout     History of colon polyps 5/3/2018    HTN (hypertension) 3/27/2012    Hyperlipidemia     Hyperlipidemia     LLL pneumonia 6/14/2018    LVH (left ventricular hypertrophy)     Mesenteric ischemia     Murmur, cardiac 3/27/2012    GRICELDA (obstructive sleep apnea)     DOES NOT USE A MACHINE    Sinus problem     Syncope and collapse      Past Surgical History:   Procedure Laterality Date    APPENDECTOMY      BLOCK-NERVE Left 5/31/2016    [facility]    CARDIAC CATHETERIZATION      COLONOSCOPY  2011    COLONOSCOPY N/A 5/3/2018    [facility] ENDO    COLONOSCOPY N/A 9/10/2015    [facility] at [facility]    CORONARY ARTERY BYPASS GRAFT  4/2007    x 1    CYSTOSCOPY N/A 8/30/2017    [facility]    CYSTOSCOPY N/A 11/10/2015    [facility] at [facility]    ESOPHAGOGASTRODUODENOSCOPY (EGD) N/A 1/4/2016    [facility] ENDO (2ND FLR)    ESOPHAGOGASTRODUODENOSCOPY (EGD) N/A 10/15/2014    [facility] at [facility] ENDO    INJECTION-STEROID-EPIDURAL-TRANSFORAMINAL Left 2/25/2016    [facility] OR    JOINT REPLACEMENT      left knee total replacement  X 3    mid leftt finger      from a cactuss    MOLE REMOVAL  2016    RADIOFREQUENCY THERMOCOAGULATION (RFTC)-NERVE-MEDIAN BRANCH-LUMBAR Left 4/11/2016    [facility]    rotative cuff      no rotative cuffs on bilat shoulders has pins     SHOULDER SURGERY      shoulder surgery bilat  RIGHT X 4; LEFT X 3    TRANSRECTAL ULTRASOUND GUIDED PROSTATE BIOPSY Bilateral 11/10/2015     [facility] OR    ULTRASOUND-ENDOSCOPIC-UPPER N/A 2017    [facility] ENDO (2ND FLR)    ULTRASOUND-ENDOSCOPIC-UPPER N/A 2016    Performed by Tra [facility] at [facility] ENDO (2ND FLR)    ULTRASOUND-ENDOSCOPIC-UPPER N/A 2015    Performed by Jason [facility]em MD at [facility] ENDO (2ND FLR)     Family History   Problem Relation Age of Onset    Heart disease Mother     Sudden death Father     Cancer Father         advanced lung ca- found after 2 story fall    Stroke Sister     Pneumonia Sister          from PNA    Heart disease Sister     Hypertension Brother     Kidney cancer Neg Hx     Prostate cancer Neg Hx     Urolithiasis Neg Hx     Allergic rhinitis Neg Hx     Allergies Neg Hx     Angioedema Neg Hx     Asthma Neg Hx     Atopy Neg Hx     Eczema Neg Hx     Immunodeficiency Neg Hx     Rhinitis Neg Hx     Urticaria Neg Hx      Social History     Tobacco Use    Smoking status: Never Smoker    Smokeless tobacco: Never Used   Substance Use Topics    Alcohol use: No    Drug use: No      Review of patient's allergies indicates:   Allergen Reactions    Ace inhibitors Other (See Comments)     Cough    Arb-angiotensin receptor antagonist Itching    Eplerenone Other (See Comments)     Marked bradycardia, 40, tiredness and weakness      Sulfa (sulfonamide antibiotics) Itching     Patient says this was 10 years ago and doesn't remember what happened     Facility-Administered Medications Prior to Admission   Medication    albuterol nebulizer solution 1.25 mg     PTA Medications   Medication Sig    albuterol (PROVENTIL/VENTOLIN HFA) 90 mcg/actuation inhaler 2 puffs every 4 hours as needed for cough, wheeze, or shortness of breath    albuterol-ipratropium (DUO-NEB) 2.5 mg-0.5 mg/3 mL nebulizer solution Take 3 mLs by nebulization every 6 (six) hours as needed for Wheezing or Shortness of Breath.    allopurinol (ZYLOPRIM) 100 MG  tablet TAKE 1 TABLET BY MOUTH EVERY DAY    aspirin (ECOTRIN) 81 MG EC tablet Take 81 mg by mouth once daily.      atorvastatin (LIPITOR) 80 MG tablet TAKE 1 TABLET (80 MG TOTAL) BY MOUTH ONCE DAILY.    azithromycin (ZITHROMAX) 500 MG tablet Take 1 tablet (500 mg total) by mouth once daily.    budesonide-formoterol 160-4.5 mcg (SYMBICORT) 160-4.5 mcg/actuation HFAA Inhale 2 puffs into the lungs every 12 (twelve) hours. Controller    carvedilol (COREG) 6.25 MG tablet Take 1 tablet (6.25 mg total) by mouth 2 (two) times daily with meals.    celecoxib (CELEBREX) 200 MG capsule Take 1 capsule (200 mg total) by mouth once daily.    finasteride (PROSCAR) 5 mg tablet TAKE 1 TABLET ONCE DAILY.    fluticasone (FLONASE) 50 mcg/actuation nasal spray 2 sprays (100 mcg total) by Each Nare route once daily.    gabapentin (NEURONTIN) 300 MG capsule Take 1 capsule (300 mg total) by mouth every evening.    isosorbide mononitrate (ISMO,MONOKET) 10 mg tablet TAKE 1 TABLET BY MOUTH EVERY DAY IN THE EVENING    levoFLOXacin (LEVAQUIN) 500 MG tablet Take 1 tablet (500 mg total) by mouth once daily.    losartan (COZAAR) 100 MG tablet TAKE 1 TABLET BY MOUTH EVERY DAY    meclizine (ANTIVERT) 25 mg tablet TAKE 1 TABLET BY MOUTH THREE TIMES A DAY AS NEEDED    mirtazapine (REMERON) 7.5 MG Tab TAKE 1 TABLET BY MOUTH EVERY EVENING.    NITROSTAT 0.4 mg SL tablet PLACE 1 TABLET (0.4 MG TOTAL) UNDER THE TONGUE EVERY 5 (FIVE) MINUTES AS NEEDED FOR CHEST PAIN.    omeprazole (PRILOSEC) 20 MG capsule TAKE 1 CAPSULE BY MOUTH EVERY DAY    predniSONE (DELTASONE) 20 MG tablet Take one pill a day for three days, repeat for shortness of breath    predniSONE (DELTASONE) 20 MG tablet 3 pills for 3 days, 2 pills for 3 days, 1 pill for 3 days, repeat if needed.    sodium chloride (SALINE NASAL MIST) 3 % Mist 1 spray by Nasal route 2 (two) times daily.    tamsulosin (FLOMAX) 0.4 mg Cap TAKE 1 CAPSULE BY MOUTH EVERY DAY    tiotropium bromide  (SPIRIVA RESPIMAT) 1.25 mcg/actuation Mist Inhale 2.5 mcg into the lungs once daily. Controller    torsemide (DEMADEX) 20 MG Tab Take 1 tablet (20 mg total) by mouth once daily.    traMADol (ULTRAM) 50 mg tablet Take 1 tablet (50 mg total) by mouth every 12 (twelve) hours as needed for Pain.    zolpidem (AMBIEN) 5 MG Tab TAKE 1 TABLET BY MOUTH EVERY EVENING AS NEEDED    zolpidem (AMBIEN) 5 MG Tab TAKE 1 TABLET BY MOUTH EVERY EVENING AS NEEDED     Review of Systems:  Constitutional: no fever or chills  Eyes: no visual changes  Ears, nose, mouth, throat, and face: no nasal congestion or sore throat  Respiratory:  See history of present illness  Cardiovascular: no chest pain or palpitations  Gastrointestinal: no nausea or vomiting, no abdominal pain or change in bowel habits  Genitourinary: no hematuria or dysuria  Integument/breast: no rash or pruritis  Hematologic/lymphatic: no easy bruising or lymphadenopathy  Musculoskeletal: no arthralgias or myalgias  Neurological: no seizures or tremors.  Behavioral/Psych: no auditory or visual hallucinations  Endocrine: no heat or cold intolerance     OBJECTIVE:     Vital Signs (Most Recent)  Temp: 98.7 °F (37.1 °C) (03/11/19 1033)  Pulse: 82 (03/11/19 1033)  Resp: 20 (03/11/19 1033)  BP: (!) 176/83 (03/11/19 1033)  SpO2: 97 % (03/11/19 1033)    Physical Exam:  General appearance: well developed, appears stated age  Head: normocephalic, atraumatic  Eyes:  conjunctivae/corneas clear. PERRL.  Nose: Nares normal. Septum midline.  Throat: lips, mucosa, and tongue normal; teeth and gums normal, no throat erythema.  Neck: supple, symmetrical, trachea midline, no JVD and thyroid not enlarged, symmetric, no tenderness/mass/nodules  Lungs:  Decreased air movement bilaterally, scattered rhonchi bilaterally.  Chest wall: no tenderness  Heart: regular rate and rhythm, S1, S2 normal, no murmur, click, rub or gallop  Abdomen: soft, non-tender non-distented; bowel sounds normal; no  masses,  no organomegaly  Extremities: no cyanosis, clubbing or edema.   Pulses: 2+ and symmetric  Skin: Skin color, texture, turgor normal. No rashes or lesions.  Lymph nodes: Cervical, supraclavicular, and axillary nodes normal.  Neurologic: Normal strength and tone. No focal numbness or weakness. CNII-XII intact.      Laboratory:   CBC: No results for input(s): WBC, RBC, HGB, HCT, PLT, MCV, MCH, MCHC in the last 168 hours.  CMP:   Recent Labs   Lab 03/11/19  1109   *   CALCIUM 7.6*   ALBUMIN 3.5   PROT 5.9*      K 4.1   CO2 23      BUN 98*   CREATININE 5.8*   ALKPHOS 95   *   AST 49*   BILITOT 0.5     Hemoglobin A1C   Date Value Ref Range Status   02/13/2015 6.2 4.5 - 6.2 % Final     Microbiology Results (last 7 days)     ** No results found for the last 168 hours. **        Diagnostic Results:  Chest X-Ray: Mild pulmonary infiltrates favoring pneumonia. Cardiomegaly, atherosclerosis and prior sternotomy.    Assessment/Plan:     * Asthma exacerbation  Community-acquired pneumonia     Supplemental O2 via nasal canula; titrate O2 saturation to >92%.   Start Solumedrol 62.5 mg IV q 8 hrs.   Pulmonary consultation.   Continue beta 2 agonist bronchodilator treatments.   Continue IV antibiotics - ceftriaxone and azithromycin.  Check sputum GS and Cx.   Continue routine medications as before.           End-stage renal     Nephrology consulted. Continue Chronic hemodialysis. Monitor daily electrolytes and defer dialysis orders to nephrology.     Anemia of chronic disease due to end-stage renal disease     Chronic problem. Will continue chronic medications and monitor for any changes, adjusting as needed.     Benign prostatic hyperplasia without lower urinary tract symptoms     Chronic problem.  Continue Flomax and Proscar.      Gout, arthritis     Chronic. Continue Allopurinol.     CAD (coronary artery disease), 2V CABG 2007     History of CAD s/p CABG.  No s/s of angina.  Continue ASA, Statin,  and BB.  Telemetry monitoring.  Monitor for s/s of ACS.       Hyperlipidemia, baseline      Chronic problem. Continue Statin therapy.          Lab Results   Component Value Date     LDLCALC 46.4 (L) 07/26/2018       Essential hypertension     Chronic problem. Controlled. Continue home medications and monitor b/p closely, adjusting as necessary.     DVT prophylaxis:  Heparin 5000 units subcu q.12 hours.    Charissa Olivas MD  Department of Hospital Medicine   [facility]

## 2019-03-11 NOTE — CONSULTS
Nephrology Consult Note        Patient Name: Micheal Hunt  MRN: [id number]    Patient Class: IP- Inpatient   Admission Date: 3/11/2019  Length of Stay: 0 days  Date of Service: 3/11/2019    Attending Physician: Charissa Olivas MD  Primary Care Provider: Pk Lakhani MD    Reason for Consult: esrd    SUBJECTIVE:     HPI: 78M recently started on HD MWF in [place], [place] admitted to Hospitalist Service from Dr. Guillen's office with complaint of persisting asthma exacerbation, failing outpatient steroids and abx therapy, now diagnosed with PNA. Renal consulted for co-management.    Seen and examined on machine today, tolerating well, no complains other than dry cough.    Past Medical History:   Diagnosis Date    NICK (acute kidney injury) 7/29/2016    Allergy     Anemia, mild 12/15/2014    Arthritis     Gout    Benign essential HTN 3/27/2012    BMI 29.0-29.9,adult 5/10/2018    BPH (benign prostatic hyperplasia)     BPH (benign prostatic hyperplasia)     CAD (coronary artery disease) 2006    Chronic kidney disease     due to ibuprofen    Colon polyp     CRF (chronic renal failure), stage 5     Diverticulosis     Gastritis     GERD (gastroesophageal reflux disease)     Gout     History of colon polyps 5/3/2018    HTN (hypertension) 3/27/2012    Hyperlipidemia     Hyperlipidemia     LLL pneumonia 6/14/2018    LVH (left ventricular hypertrophy)     Mesenteric ischemia     Murmur, cardiac 3/27/2012    GRICELDA (obstructive sleep apnea)     DOES NOT USE A MACHINE    Sinus problem     Syncope and collapse      Past Surgical History:   Procedure Laterality Date    APPENDECTOMY      BLOCK-NERVE Left 5/31/2016    [facility]    CARDIAC CATHETERIZATION      COLONOSCOPY  2011    COLONOSCOPY N/A 5/3/2018    [facility] ENDO    COLONOSCOPY N/A 9/10/2015    [facility] at [facility]O    CORONARY ARTERY BYPASS GRAFT  4/2007    x 1     CYSTOSCOPY N/A 2017    [facility]    CYSTOSCOPY N/A 11/10/2015    [facility]    ESOPHAGOGASTRODUODENOSCOPY (EGD) N/A 2016    Performed by Tra [facility] at [facility] ENDO (2ND FLR)    ESOPHAGOGASTRODUODENOSCOPY (EGD) N/A 10/15/2014    [facility] at [facility] ENDO    INJECTION-STEROID-EPIDURAL-TRANSFORAMINAL Left 2016    [facility]    JOINT REPLACEMENT      left knee total replacement  X 3    mid leftt finger      from a cactuss    MOLE REMOVAL  2016    RADIOFREQUENCY THERMOCOAGULATION (RFTC)-NERVE-MEDIAN BRANCH-LUMBAR Left 2016    [facility]    rotative cuff      no rotative cuffs on bilat shoulders has pins     SHOULDER SURGERY      shoulder surgery bilat  RIGHT X 4; LEFT X 3    TRANSRECTAL ULTRASOUND GUIDED PROSTATE BIOPSY Bilateral 11/10/2015    [facility] at [facility] OR    ULTRASOUND-ENDOSCOPIC-UPPER N/A 2017    Performed by Tra [facility] at [facility] ENDO (2ND FLR)    ULTRASOUND-ENDOSCOPIC-UPPER N/A 2016    Performed by Tra [facility] at [facility] ENDO (2ND FLR)    ULTRASOUND-ENDOSCOPIC-UPPER N/A 2015    Performed by Jason Saleem MD at [facility] ENDO (2ND FLR)     Family History   Problem Relation Age of Onset    Heart disease Mother     Sudden death Father     Cancer Father         advanced lung ca- found after 2 story fall    Stroke Sister     Pneumonia Sister          from PNA    Heart disease Sister     Hypertension Brother     Kidney cancer Neg Hx     Prostate cancer Neg Hx     Urolithiasis Neg Hx     Allergic rhinitis Neg Hx     Allergies Neg Hx     Angioedema Neg Hx     Asthma Neg Hx     Atopy Neg Hx     Eczema Neg Hx     Immunodeficiency Neg Hx     Rhinitis Neg Hx     Urticaria Neg Hx      Social History     Tobacco Use    Smoking status: Never Smoker    Smokeless tobacco:  Never Used   Substance Use Topics    Alcohol use: No    Drug use: No       Review of patient's allergies indicates:   Allergen Reactions    Ace inhibitors Other (See Comments)     Cough    Arb-angiotensin receptor antagonist Itching    Eplerenone Other (See Comments)     Marked bradycardia, 40, tiredness and weakness      Sulfa (sulfonamide antibiotics) Itching     Patient says this was 10 years ago and doesn't remember what happened       Outpatient meds:  No current facility-administered medications on file prior to encounter.      Current Outpatient Medications on File Prior to Encounter   Medication Sig Dispense Refill    albuterol (PROVENTIL/VENTOLIN HFA) 90 mcg/actuation inhaler 2 puffs every 4 hours as needed for cough, wheeze, or shortness of breath 3 Inhaler 3    albuterol-ipratropium (DUO-NEB) 2.5 mg-0.5 mg/3 mL nebulizer solution Take 3 mLs by nebulization every 6 (six) hours as needed for Wheezing or Shortness of Breath. 270 mL 0    allopurinol (ZYLOPRIM) 100 MG tablet TAKE 1 TABLET BY MOUTH EVERY DAY 90 tablet 1    aspirin (ECOTRIN) 81 MG EC tablet Take 81 mg by mouth once daily.        atorvastatin (LIPITOR) 80 MG tablet TAKE 1 TABLET (80 MG TOTAL) BY MOUTH ONCE DAILY. 90 tablet 3    azithromycin (ZITHROMAX) 500 MG tablet Take 1 tablet (500 mg total) by mouth once daily. 3 tablet 2    budesonide-formoterol 160-4.5 mcg (SYMBICORT) 160-4.5 mcg/actuation HFAA Inhale 2 puffs into the lungs every 12 (twelve) hours. Controller 3 Inhaler 4    carvedilol (COREG) 6.25 MG tablet Take 1 tablet (6.25 mg total) by mouth 2 (two) times daily with meals. 60 tablet 3    celecoxib (CELEBREX) 200 MG capsule Take 1 capsule (200 mg total) by mouth once daily. 30 capsule 3    finasteride (PROSCAR) 5 mg tablet TAKE 1 TABLET ONCE DAILY. 90 tablet 3    fluticasone (FLONASE) 50 mcg/actuation nasal spray 2 sprays (100 mcg total) by Each Nare route once daily. 3 Bottle 3    gabapentin (NEURONTIN) 300 MG capsule  Take 1 capsule (300 mg total) by mouth every evening. 90 capsule 3    isosorbide mononitrate (ISMO,MONOKET) 10 mg tablet TAKE 1 TABLET BY MOUTH EVERY DAY IN THE EVENING 30 tablet 3    levoFLOXacin (LEVAQUIN) 500 MG tablet Take 1 tablet (500 mg total) by mouth once daily. 7 tablet 0    losartan (COZAAR) 100 MG tablet TAKE 1 TABLET BY MOUTH EVERY DAY 90 tablet 0    meclizine (ANTIVERT) 25 mg tablet TAKE 1 TABLET BY MOUTH THREE TIMES A DAY AS NEEDED 90 tablet 0    mirtazapine (REMERON) 7.5 MG Tab TAKE 1 TABLET BY MOUTH EVERY EVENING. 30 tablet 2    NITROSTAT 0.4 mg SL tablet PLACE 1 TABLET (0.4 MG TOTAL) UNDER THE TONGUE EVERY 5 (FIVE) MINUTES AS NEEDED FOR CHEST PAIN. 25 tablet 3    omeprazole (PRILOSEC) 20 MG capsule TAKE 1 CAPSULE BY MOUTH EVERY DAY 30 capsule 1    predniSONE (DELTASONE) 20 MG tablet Take one pill a day for three days, repeat for shortness of breath 9 tablet 2    predniSONE (DELTASONE) 20 MG tablet 3 pills for 3 days, 2 pills for 3 days, 1 pill for 3 days, repeat if needed. 36 tablet 0    sodium chloride (SALINE NASAL MIST) 3 % Mist 1 spray by Nasal route 2 (two) times daily.      tamsulosin (FLOMAX) 0.4 mg Cap TAKE 1 CAPSULE BY MOUTH EVERY DAY 30 capsule 2    tiotropium bromide (SPIRIVA RESPIMAT) 1.25 mcg/actuation Mist Inhale 2.5 mcg into the lungs once daily. Controller 4 g 5    torsemide (DEMADEX) 20 MG Tab Take 1 tablet (20 mg total) by mouth once daily. 30 tablet 5    traMADol (ULTRAM) 50 mg tablet Take 1 tablet (50 mg total) by mouth every 12 (twelve) hours as needed for Pain. 60 tablet 2    zolpidem (AMBIEN) 5 MG Tab TAKE 1 TABLET BY MOUTH EVERY EVENING AS NEEDED 30 tablet 3    zolpidem (AMBIEN) 5 MG Tab TAKE 1 TABLET BY MOUTH EVERY EVENING AS NEEDED 30 tablet 3       Scheduled meds:   heparin (porcine)  5,000 Units Subcutaneous Q12H    methylPREDNISolone sodium succinate  62.5 mg Intravenous Q8H    pantoprazole  40 mg Oral Daily       Infusions:      PRN  meds:  albuterol-ipratropium, dextrose 50%, dextrose 50%, glucagon (human recombinant), glucose, glucose, HYDROcodone-acetaminophen, magnesium oxide, magnesium oxide, ondansetron, polyethylene glycol, potassium chloride 10%, potassium chloride 10%, potassium, sodium phosphates, potassium, sodium phosphates, potassium, sodium phosphates, sodium chloride 0.9%    Review of Systems:  Review of Systems   Constitutional: Negative for chills, fever, malaise/fatigue and weight loss.   HENT: Negative for hearing loss and nosebleeds.    Eyes: Negative for blurred vision, double vision and photophobia.   Respiratory: Negative for cough, shortness of breath and wheezing.    Cardiovascular: Negative for chest pain, palpitations and leg swelling.   Gastrointestinal: Negative for abdominal pain, constipation, diarrhea, heartburn, nausea and vomiting.   Genitourinary: Negative for dysuria, frequency and urgency.   Musculoskeletal: Negative for falls, joint pain and myalgias.   Skin: Negative for itching and rash.   Neurological: Negative for dizziness, speech change, focal weakness, loss of consciousness and headaches.   Endo/Heme/Allergies: Does not bruise/bleed easily.   Psychiatric/Behavioral: Negative for depression and substance abuse. The patient is not nervous/anxious.        OBJECTIVE:     Vital Signs and IO (Last 24H):  Temp:  [98.7 °F (37.1 °C)]   Pulse:  [82]   Resp:  [20]   BP: (141-176)/(79-83)   SpO2:  [97 %]   No intake/output data recorded.    Wt Readings from Last 5 Encounters:   03/11/19 111.6 kg (246 lb 0.5 oz)   02/28/19 107 kg (236 lb)   02/26/19 107.5 kg (236 lb 15.9 oz)   02/26/19 107.5 kg (237 lb)   02/21/19 107.8 kg (237 lb 10.5 oz)         Physical Exam:  Physical Exam   Constitutional: He is oriented to person, place, and time. He appears well-developed and well-nourished.   HENT:   Head: Normocephalic and atraumatic.   Mouth/Throat: Oropharynx is clear and moist.   Eyes: EOM are normal. Pupils are equal,  round, and reactive to light. No scleral icterus.   Neck: Neck supple.   Cardiovascular: Normal rate and regular rhythm.   Pulmonary/Chest: Effort normal. No stridor. No respiratory distress.   Abdominal: Soft. He exhibits no distension.   Musculoskeletal: Normal range of motion. He exhibits no edema or deformity.   Neurological: He is alert and oriented to person, place, and time. No cranial nerve deficit.   Skin: Skin is warm and dry. No rash noted. He is not diaphoretic. No erythema.   Psychiatric: He has a normal mood and affect. His behavior is normal.       There is no height or weight on file to calculate BMI.    Laboratory:  Recent Labs   Lab 03/11/19  1109      K 4.1      CO2 23   BUN 98*   CREATININE 5.8*   ESTGFRAFRICA 10*   EGFRNONAA 9*   CALCIUM 7.6*   ALBUMIN 3.5   PHOS 4.4       No results for input(s): WBC, HGB, HCT, PLT, MCV, MCHC, NEUTOPHILPCT, MONO in the last 168 hours.    Invalid input(s):  EOSINOPHIL    Recent Labs   Lab 03/11/19  1109   ALKPHOS 95   BILITOT 0.5   PROT 5.9*   ALBUMIN 3.5   *   AST 49*       ASSESSMENT/PLAN:     Active Hospital Problems    Diagnosis  POA    Asthma exacerbation [J45.901]  Yes      Resolved Hospital Problems   No resolved problems to display.       ESRD on HD MWF via [facility]  Continue current dialysis prescription.  Next HD Wednesday or PRN.  Renal diet - low K, low phos.  No IVs or BP checks on access arm.    Anemia of CKD  Stable. Monitor.  No need for NIC.  No need for IV iron.    MBD / Secondary HPT  Monitor phos levels. Low phos diet.    HTN  Controlled.   Tolerate asymptomatic HTN up to -160.  Continue home meds.  Low sodium diet.    COPD/asthma exacerbation, PNA  Agree with abx, management per primary team.    Thank you for allowing us to participate in the care of your patient!   We will follow the patient and provide recommendations as needed.    Darrel Cary MD    [facility]  [address] [place] [address] [place]  [id number]    [phone] - tel  [phone] - fax    3/11/2019 12:14 PM

## 2019-03-11 NOTE — H&P (VIEW-ONLY)
PCP: Pk Lakhani MD    History & Physical    Chief Complaint:  Persistent asthma exacerbation    History of Present Illness:  Patient is a 78 y.o. male admitted to Hospitalist Service from Dr. Guillen's office with complaint of persisting asthma exacerbation. Patient reportedly has past medical history significant for bronchial asthma, hypertension, end-stage renal disease (on hemodialysis Monday/Wednesday/Friday), coronary artery disease, benign prostatic hypertrophy, gastroesophageal reflux disease, history of gout hyperlipidemia, obstructive sleep apnea (not on any CPAP).  Reportedly patient has been having lingering symptoms of shortness of breath, wheezing.  Lately patient is getting increasingly weak and dizzy.  Cough is persisting which is mostly nonproductive.  Patient is compliant with his routine hemodialysis (on Monday, Wednesday, Friday).  Patient has recently completed 1st azithromycin than oral Levaquin antibiotic therapy.  Presently taking oral Cipro.  Patient has received intramuscular steroid shot and prednisone taper course.  Patient is requiring increasing use/frequency of nebulizers and inhalers despite no significant improvement.  The patient denies any sick contact or recent travel history.  Patient traveled to [place] in the month of February.  Expect duration is mostly clear. Patient denied chest pain, abdominal pain, nausea, vomiting, headache, vision changes, focal neuro-deficits, cough or fever.  Patient denied any orthopnea or chest pain. No calf tenderness reported.  Patient also following with Dr. Aguilera from Allergy/immunology Department.  Patient was hospitalized in November 2018 for pneumonia evaluation and management.    Past Medical History:   Diagnosis Date    NICK (acute kidney injury) 7/29/2016    Allergy     Anemia, mild 12/15/2014    Arthritis     Gout    Benign essential HTN 3/27/2012    BMI 29.0-29.9,adult 5/10/2018    BPH (benign prostatic hyperplasia)     BPH  (benign prostatic hyperplasia)     CAD (coronary artery disease) 2006    Chronic kidney disease     due to ibuprofen    Colon polyp     CRF (chronic renal failure), stage 5     Diverticulosis     Gastritis     GERD (gastroesophageal reflux disease)     Gout     History of colon polyps 5/3/2018    HTN (hypertension) 3/27/2012    Hyperlipidemia     Hyperlipidemia     LLL pneumonia 6/14/2018    LVH (left ventricular hypertrophy)     Mesenteric ischemia     Murmur, cardiac 3/27/2012    GRICELDA (obstructive sleep apnea)     DOES NOT USE A MACHINE    Sinus problem     Syncope and collapse      Past Surgical History:   Procedure Laterality Date    APPENDECTOMY      BLOCK-NERVE Left 5/31/2016    [facility]    CARDIAC CATHETERIZATION      COLONOSCOPY  2011    COLONOSCOPY N/A 5/3/2018    [facility]    COLONOSCOPY N/A 9/10/2015    [facility]    CORONARY ARTERY BYPASS GRAFT  4/2007    x 1    CYSTOSCOPY N/A 8/30/2017    [facility]    CYSTOSCOPY N/A 11/10/2015    [facility]    ESOPHAGOGASTRODUODENOSCOPY (EGD) N/A 1/4/2016    [facility] ([facility]    ESOPHAGOGASTRODUODENOSCOPY (EGD) N/A 10/15/2014    [facility]    INJECTION-STEROID-EPIDURAL-TRANSFORAMINAL Left 2/25/2016    [facility]    JOINT REPLACEMENT      left knee total replacement  X 3    mid leftt finger      from a cactuss    MOLE REMOVAL  2016    RADIOFREQUENCY THERMOCOAGULATION (RFTC)-NERVE-MEDIAN BRANCH-LUMBAR Left 4/11/2016    [facility]    rotative cuff      no rotative cuffs on bilat shoulders has pins     SHOULDER SURGERY      shoulder surgery bilat  RIGHT X 4; LEFT X 3    TRANSRECTAL ULTRASOUND GUIDED PROSTATE BIOPSY Bilateral 11/10/2015     [facility]    ULTRASOUND-ENDOSCOPIC-UPPER N/A 2017    [facility] ENDO (2ND FLR)    ULTRASOUND-ENDOSCOPIC-UPPER N/A 2016    Performed [facility] at [facility] ENDO (2ND FLR)    ULTRASOUND-ENDOSCOPIC-UPPER N/A 2015    Performed by Jason Saleem [facility] at [facility] ENDO (2ND FLR)     Family History   Problem Relation Age of Onset    Heart disease Mother     Sudden death Father     Cancer Father         advanced lung ca- found after 2 story fall    Stroke Sister     Pneumonia Sister          from PNA    Heart disease Sister     Hypertension Brother     Kidney cancer Neg Hx     Prostate cancer Neg Hx     Urolithiasis Neg Hx     Allergic rhinitis Neg Hx     Allergies Neg Hx     Angioedema Neg Hx     Asthma Neg Hx     Atopy Neg Hx     Eczema Neg Hx     Immunodeficiency Neg Hx     Rhinitis Neg Hx     Urticaria Neg Hx      Social History     Tobacco Use    Smoking status: Never Smoker    Smokeless tobacco: Never Used   Substance Use Topics    Alcohol use: No    Drug use: No      Review of patient's allergies indicates:   Allergen Reactions    Ace inhibitors Other (See Comments)     Cough    Arb-angiotensin receptor antagonist Itching    Eplerenone Other (See Comments)     Marked bradycardia, 40, tiredness and weakness      Sulfa (sulfonamide antibiotics) Itching     Patient says this was 10 years ago and doesn't remember what happened     Facility-Administered Medications Prior to Admission   Medication    albuterol nebulizer solution 1.25 mg     PTA Medications   Medication Sig    albuterol (PROVENTIL/VENTOLIN HFA) 90 mcg/actuation inhaler 2 puffs every 4 hours as needed for cough, wheeze, or shortness of breath    albuterol-ipratropium (DUO-NEB) 2.5 mg-0.5 mg/3 mL nebulizer solution Take 3 mLs by nebulization every 6 (six) hours as needed for Wheezing or Shortness of Breath.    allopurinol (ZYLOPRIM) 100 MG  tablet TAKE 1 TABLET BY MOUTH EVERY DAY    aspirin (ECOTRIN) 81 MG EC tablet Take 81 mg by mouth once daily.      atorvastatin (LIPITOR) 80 MG tablet TAKE 1 TABLET (80 MG TOTAL) BY MOUTH ONCE DAILY.    azithromycin (ZITHROMAX) 500 MG tablet Take 1 tablet (500 mg total) by mouth once daily.    budesonide-formoterol 160-4.5 mcg (SYMBICORT) 160-4.5 mcg/actuation HFAA Inhale 2 puffs into the lungs every 12 (twelve) hours. Controller    carvedilol (COREG) 6.25 MG tablet Take 1 tablet (6.25 mg total) by mouth 2 (two) times daily with meals.    celecoxib (CELEBREX) 200 MG capsule Take 1 capsule (200 mg total) by mouth once daily.    finasteride (PROSCAR) 5 mg tablet TAKE 1 TABLET ONCE DAILY.    fluticasone (FLONASE) 50 mcg/actuation nasal spray 2 sprays (100 mcg total) by Each Nare route once daily.    gabapentin (NEURONTIN) 300 MG capsule Take 1 capsule (300 mg total) by mouth every evening.    isosorbide mononitrate (ISMO,MONOKET) 10 mg tablet TAKE 1 TABLET BY MOUTH EVERY DAY IN THE EVENING    levoFLOXacin (LEVAQUIN) 500 MG tablet Take 1 tablet (500 mg total) by mouth once daily.    losartan (COZAAR) 100 MG tablet TAKE 1 TABLET BY MOUTH EVERY DAY    meclizine (ANTIVERT) 25 mg tablet TAKE 1 TABLET BY MOUTH THREE TIMES A DAY AS NEEDED    mirtazapine (REMERON) 7.5 MG Tab TAKE 1 TABLET BY MOUTH EVERY EVENING.    NITROSTAT 0.4 mg SL tablet PLACE 1 TABLET (0.4 MG TOTAL) UNDER THE TONGUE EVERY 5 (FIVE) MINUTES AS NEEDED FOR CHEST PAIN.    omeprazole (PRILOSEC) 20 MG capsule TAKE 1 CAPSULE BY MOUTH EVERY DAY    predniSONE (DELTASONE) 20 MG tablet Take one pill a day for three days, repeat for shortness of breath    predniSONE (DELTASONE) 20 MG tablet 3 pills for 3 days, 2 pills for 3 days, 1 pill for 3 days, repeat if needed.    sodium chloride (SALINE NASAL MIST) 3 % Mist 1 spray by Nasal route 2 (two) times daily.    tamsulosin (FLOMAX) 0.4 mg Cap TAKE 1 CAPSULE BY MOUTH EVERY DAY    tiotropium bromide  (SPIRIVA RESPIMAT) 1.25 mcg/actuation Mist Inhale 2.5 mcg into the lungs once daily. Controller    torsemide (DEMADEX) 20 MG Tab Take 1 tablet (20 mg total) by mouth once daily.    traMADol (ULTRAM) 50 mg tablet Take 1 tablet (50 mg total) by mouth every 12 (twelve) hours as needed for Pain.    zolpidem (AMBIEN) 5 MG Tab TAKE 1 TABLET BY MOUTH EVERY EVENING AS NEEDED    zolpidem (AMBIEN) 5 MG Tab TAKE 1 TABLET BY MOUTH EVERY EVENING AS NEEDED     Review of Systems:  Constitutional: no fever or chills  Eyes: no visual changes  Ears, nose, mouth, throat, and face: no nasal congestion or sore throat  Respiratory:  See history of present illness  Cardiovascular: no chest pain or palpitations  Gastrointestinal: no nausea or vomiting, no abdominal pain or change in bowel habits  Genitourinary: no hematuria or dysuria  Integument/breast: no rash or pruritis  Hematologic/lymphatic: no easy bruising or lymphadenopathy  Musculoskeletal: no arthralgias or myalgias  Neurological: no seizures or tremors.  Behavioral/Psych: no auditory or visual hallucinations  Endocrine: no heat or cold intolerance     OBJECTIVE:     Vital Signs (Most Recent)  Temp: 98.7 °F (37.1 °C) (03/11/19 1033)  Pulse: 82 (03/11/19 1033)  Resp: 20 (03/11/19 1033)  BP: (!) 176/83 (03/11/19 1033)  SpO2: 97 % (03/11/19 1033)    Physical Exam:  General appearance: well developed, appears stated age  Head: normocephalic, atraumatic  Eyes:  conjunctivae/corneas clear. PERRL.  Nose: Nares normal. Septum midline.  Throat: lips, mucosa, and tongue normal; teeth and gums normal, no throat erythema.  Neck: supple, symmetrical, trachea midline, no JVD and thyroid not enlarged, symmetric, no tenderness/mass/nodules  Lungs:  Decreased air movement bilaterally, scattered rhonchi bilaterally.  Chest wall: no tenderness  Heart: regular rate and rhythm, S1, S2 normal, no murmur, click, rub or gallop  Abdomen: soft, non-tender non-distented; bowel sounds normal; no  masses,  no organomegaly  Extremities: no cyanosis, clubbing or edema.   Pulses: 2+ and symmetric  Skin: Skin color, texture, turgor normal. No rashes or lesions.  Lymph nodes: Cervical, supraclavicular, and axillary nodes normal.  Neurologic: Normal strength and tone. No focal numbness or weakness. CNII-XII intact.      Laboratory:   CBC: No results for input(s): WBC, RBC, HGB, HCT, PLT, MCV, MCH, MCHC in the last 168 hours.  CMP:   Recent Labs   Lab 03/11/19  1109   *   CALCIUM 7.6*   ALBUMIN 3.5   PROT 5.9*      K 4.1   CO2 23      BUN 98*   CREATININE 5.8*   ALKPHOS 95   *   AST 49*   BILITOT 0.5     Hemoglobin A1C   Date Value Ref Range Status   02/13/2015 6.2 4.5 - 6.2 % Final     Microbiology Results (last 7 days)     ** No results found for the last 168 hours. **        Diagnostic Results:  Chest X-Ray: Mild pulmonary infiltrates favoring pneumonia. Cardiomegaly, atherosclerosis and prior sternotomy.    Assessment/Plan:     * Asthma exacerbation  Community-acquired pneumonia     Supplemental O2 via nasal canula; titrate O2 saturation to >92%.   Start Solumedrol 62.5 mg IV q 8 hrs.   Pulmonary consultation.   Continue beta 2 agonist bronchodilator treatments.   Continue IV antibiotics - ceftriaxone and azithromycin.  Check sputum GS and Cx.   Continue routine medications as before.           End-stage renal     Nephrology consulted. Continue Chronic hemodialysis. Monitor daily electrolytes and defer dialysis orders to nephrology.     Anemia of chronic disease due to end-stage renal disease     Chronic problem. Will continue chronic medications and monitor for any changes, adjusting as needed.     Benign prostatic hyperplasia without lower urinary tract symptoms     Chronic problem.  Continue Flomax and Proscar.      Gout, arthritis     Chronic. Continue Allopurinol.     CAD (coronary artery disease), 2V CABG 2007     History of CAD s/p CABG.  No s/s of angina.  Continue ASA, Statin,  and BB.  Telemetry monitoring.  Monitor for s/s of ACS.       Hyperlipidemia, baseline      Chronic problem. Continue Statin therapy.          Lab Results   Component Value Date     LDLCALC 46.4 (L) 07/26/2018       Essential hypertension     Chronic problem. Controlled. Continue home medications and monitor b/p closely, adjusting as necessary.     DVT prophylaxis:  Heparin 5000 units subcu q.12 hours.    Charissa Olivas MD  Department of Hospital Medicine   [facility]

## 2019-03-11 NOTE — PROGRESS NOTES
3/11/2019    Micheal Hunt  Office Note    Chief Complaint   Patient presents with    Pneumonia     not feeling better    Shortness of Breath    Cough     dry, finished antibiotics       HPI: 3/11/2019- no improvement during past month, cough no sputum for respiratory cultures using albuterol nebulizer and rescue inhaler every 2 hours, limited improvement with therapy,   Failing outpatient therapy. Feeling of weakness, dizziness, dry cough through out day, on hemodialysis M-W-F. Spoke to daughter on cellphone states farther's breathing worsening.  Unable to breath lying down, sleeping in recliner. Recent Azithromycin and Levaquin oral in February with no improvement.    2/26/2019- failing out patient therapy. Dry cough, SOB with exertion, had celestone injection last week felt better for few days. Symptoms returned. Fells like right chest burning. No albuterol treatment since night prior.  Finished prednisone 20 mg for 3 days. Has not repeated.    2/21/2019- cough unchanged no improvement. Took prednisone taper helped for few days but cough persists, worse at night, severe.  Productive white in color. Albuterol inhaler 1x daily mostly in evenings.   2-3 nocturnal arousals nightly,     02/07/2019- Cough: onset 1 week, worse at night, productive white in color. SOB with exertion, relieved with rest, and albuterol.  No nocturnal arousals, Albuterol rescue inhaler 1x daily, taking symbicort daily.      11/21/18 Admitted [facility] for Pneumonia discharged Admit Date: 11/5/2018-11/09/2018.   SOB improved since discharge, SOB onset few months Summer time, SOB and chest tightness with exertion walking, relieved with rest. Not currently taking symbicort inhaler or albuterol inhaler.   Currently on Cipro half dose due ESRD, on dialysis.      Previous HPI Dr. 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     Has yr round sinus problems - drainage ,   Clear to green,  abx help but avoids with renal dz.  DR Nails did sinus work - saw Dr Bartlett - penuomococcal titers were low, no vaccine     Lots of cough and h/o wheezes, not sob.  Has lung burning off and on  - burns when exerts - releif with rest.  Duration 5-10 min but stops with rest.     Sees Dr Dunlap,   Started 2010 when moved to [facility]- onset with chest burning while loading hay.      pft with low MVV, denies sob now nor muscle weakness.     Bell Wilkinson NP   HPI:   Micheal Hunt is a 79 yo male with PMHx of CKD stage 5, CAD, HTN, BPH, and HLD.  He was admitted to the service of hospital medicine with HCAP.  He presented to the ED with a one day onset of fever and cough.  Symptoms were associated with fatigue, nausea, wheezing and hemoptysis. He stated that he was recently admitted for a stomach virus about three weeks ago.  He denied chest pain, sob, abdominal pain, vomiting, diarrhea, dysuria and leg swelling.     * No surgery found *       Hospital Course:   Patient monitored closely during hospitalization. Telemetry ordered. He did have a short run of Vtach and was asymptomatic. Cardiology consulted. He was initiated on IV antibiotics for pneumonia. Pulmonary consulted. He received oral steroids during admission. Nephrology consulted for CKD stage V management. Patient encouraged to initiated HD, however is not open to starting this admission, He wishes to follow up with Dr. Bishop. He was noted to have diastolic HF and received Lasix 60 mg IV with good response. Dyspnea improved. ECHO obtained. Labs monitored. Procalcitonin negative. He is feeling better and is stable for DC from pulmonary, nephrology, and cardiac standpoint.      PE; chest coarse. Heart RRR with Murmum LUE fistula      Mihir Guillen MD Physician Pulmonology   Nov 7, sl wheezes, feels better, ask for tramadol tid (renal failure max dose 200/d),  Follows dr Bishop,    Nov 8- feels better but had small amount brb hemoptysis  with some chest pain this am. Smaller amount than 2 prior episodes.  Nov 9, says chest discomfort (denied chest pain when I initially saw pt) and cough are resolved.  Feels well now.     Plan:   Nov 7, sl wheezes, feels better, ask for tramadol tid (renal failure max dose 200/d),  Follows dr Bishop,  Pt ambulating and seems to be doing well.     Proteinuria noted, non gap acidosis likely renal- bicarb reasonable.  There is no pulmonary artery aneursym.       Pt was on oral cipro sice 10/ bid with renal failure and apparently  Failed. There are no blood cultures resulted on epic at this time?  No sputum submitted?   Order bicarb and dose prednisone x2.       Best to monitor another day as failed outpt abx?        Addendum- pt says did not take cipro for fear side effects.        Nov 8- ct reviewed as was today's cxr.  Pt is being rx for abx after presenting with fever and chill and hemoptysis - ct had airways open and lll infiltrate.  Pt had some brb hemoptysis today.     cta would be a problem with renal status. Will check d dimer and u/s legs and monitor.  Airways ok on ct -  Dx not clear.  Pt is on low dose steroid.  There is no concern re pulm art aneurysm.     Nov 9,  Sputum from yesterday pending this am.  Pt did not use cipro pta- was ordered but pt didn't take.  Pt did have chill, fever, hemoptysis at admit with ? vague slight recurrence yesterday?  procalcitonin was 0.18 admit, below 0.10 would suggest not pneumonia.  No cta due to renal failure.       If no dvt on leg u/s this am--outpt on abx reasonable.  Will f/u in office with ct to assure left lower lung abn clears.  Working dx is pneumonia- has some atypical features.       Electronically signed by Mihir Guillen MD at 11/9/2018       The chief compliant  problem is worsening  PFSH:  Past Medical History:   Diagnosis Date    NICK (acute kidney injury) 7/29/2016    Allergy     Anemia, mild 12/15/2014    Arthritis     Gout    Benign essential  HTN 3/27/2012    BMI 29.0-29.9,adult 5/10/2018    BPH (benign prostatic hyperplasia)     BPH (benign prostatic hyperplasia)     CAD (coronary artery disease) 2006    Chronic kidney disease     due to ibuprofen    Colon polyp     CRF (chronic renal failure), stage 5     Diverticulosis     Gastritis     GERD (gastroesophageal reflux disease)     Gout     History of colon polyps 5/3/2018    HTN (hypertension) 3/27/2012    Hyperlipidemia     Hyperlipidemia     LLL pneumonia 6/14/2018    LVH (left ventricular hypertrophy)     Mesenteric ischemia     Murmur, cardiac 3/27/2012    GRICELDA (obstructive sleep apnea)     DOES NOT USE A MACHINE    Sinus problem     Syncope and collapse          Past Surgical History:   Procedure Laterality Date    APPENDECTOMY      BLOCK-NERVE Left 5/31/2016    [facility]    CARDIAC CATHETERIZATION      COLONOSCOPY  2011    COLONOSCOPY N/A 5/3/2018    [facility]O    COLONOSCOPY N/A 9/10/2015    [facility]    CORONARY ARTERY BYPASS GRAFT  4/2007    x 1    CYSTOSCOPY N/A 8/30/2017    [facility]    CYSTOSCOPY N/A 11/10/2015    [facility]    ESOPHAGOGASTRODUODENOSCOPY (EGD) N/A 1/4/2016    [facility] (2ND F[facility])    ESOPHAGOGASTRODUODENOSCOPY (EGD) N/A 10/15/2014    [facility] at [facility] ENDO    INJECTION-STEROID-EPIDURAL-TRANSFORAMINAL Left 2/25/2016    [facility]    JOINT REPLACEMENT      left knee total replacement  X 3    mid leftt finger      from a cactuss    MOLE REMOVAL  2016    RADIOFREQUENCY THERMOCOAGULATION (RFTC)-NERVE-MEDIAN BRANCH-LUMBAR Left 4/11/2016    [facility]    rotative cuff      no rotative cuffs on bilat shoulders has pins     SHOULDER SURGERY      shoulder surgery  bilat  RIGHT X 4; LEFT X 3    TRANSRECTAL ULTRASOUND GUIDED PROSTATE BIOPSY Bilateral 11/10/2015    [facility]    ULTRASOUND-ENDOSCOPIC-UPPER N/A 2017    [facility] ([facility])    ULTRASOUND-ENDOSCOPIC-UPPER N/A 2016    [facility] ([facility]ND FLR)    ULTRASOUND-ENDOSCOPIC-UPPER N/A 2015    [facility] (2ND FLR)     Social History     Tobacco Use    Smoking status: Never Smoker    Smokeless tobacco: Never Used   Substance Use Topics    Alcohol use: No    Drug use: No     Family History   Problem Relation Age of Onset    Heart disease Mother     Sudden death Father     Cancer Father         advanced lung ca- found after 2 story fall    Stroke Sister     Pneumonia Sister          from PNA    Heart disease Sister     Hypertension Brother     Kidney cancer Neg Hx     Prostate cancer Neg Hx     Urolithiasis Neg Hx     Allergic rhinitis Neg Hx     Allergies Neg Hx     Angioedema Neg Hx     Asthma Neg Hx     Atopy Neg Hx     Eczema Neg Hx     Immunodeficiency Neg Hx     Rhinitis Neg Hx     Urticaria Neg Hx      Review of patient's allergies indicates:   Allergen Reactions    Ace inhibitors Other (See Comments)     Cough    Arb-angiotensin receptor antagonist Itching    Eplerenone Other (See Comments)     Marked bradycardia, 40, tiredness and weakness      Sulfa (sulfonamide antibiotics) Itching     Patient says this was 10 years ago and doesn't remember what happened     I have reviewed past medical, family, and social history. I have reviewed previous nurse notes.    Performance Status:The patient's activity level is functions out of house.          Review of Systems   Constitutional: Negative for activity change, appetite change, chills, diaphoresis, fever and unexpected weight change. Positive for  fatigue   HENT: Negative for dental problem,  "sneezing, trouble swallowing and voice change. Positive loss taste, postnasal drip  Respiratory: Negative for apnea, wheezing and stridor.  Positive for chest tightness, SOB, cough,   Cardiovascular: Negative for palpitations and leg swelling. Positive for chest pain, aching sensation  Gastrointestinal: Negative for abdominal distention, abdominal pain, constipation and nausea.   Musculoskeletal: Negative for gait problem and neck pain. Walks with cane  Skin: Negative for color change and pallor.   Allergic/Immunologic: Negative for environmental allergies and food allergies.   Neurological: Negative for speech difficulty, weakness, light-headedness, numbness and headaches.  Hematological: Negative for adenopathy. Does not bruise/bleed easily.   Psychiatric/Behavioral: Negative for dysphoric mood and sleep disturbance. The patient is not nervous/anxious.           Exam:Comprehensive exam done. BP (!) 141/79 (BP Location: Right arm, Patient Position: Sitting)   Pulse 82   Ht 5' 11" (1.803 m)   Wt 111.6 kg (246 lb 0.5 oz)   SpO2 97% Comment: on room air  BMI 34.31 kg/m²   Exam included Vitals as listed  Constitutional: He is oriented to person, place, and time. He appears well-developed. No distress.   Nose: Nose normal.   Mouth/Throat: Uvula is midline, oropharynx is clear and moist and mucous membranes are normal. No dental caries. No oropharyngeal exudate, posterior oropharyngeal edema, posterior oropharyngeal erythema or tonsillar abscesses.  Mallapatti (M) score 3  Eyes: Pupils are equal, round, and reactive to light.   Neck: No JVD present. No thyromegaly present.   Cardiovascular: Normal rate, regular rhythm and normal heart sounds. Exam reveals no gallop and no friction rub.   No murmur heard.  Pulmonary/Chest: Effort normal and breath sounds abnormal: bilateral rhonchi and wheeze, exertion      Musculoskeletal: Normal range of motion. exhibits no edema.   Lymphadenopathy:     He has no cervical " adenopathy.     He has no axillary adenopathy.   Neurological:  alert and oriented to person, place, and time. not disoriented.   Skin: Skin is warm and dry. Capillary refill takes less 2 sec. No cyanosis or erythema. No pallor. Nails show no clubbing.   Psychiatric: normal mood and affect. behavior is normal. Judgment and thought content normal.         Radiographs (ct chest and cxr) reviewed: results reviewed by direct vision  XR CHEST PA AND LATERAL   03/11/2019   Mild pulmonary infiltrates favoring pneumonia.    Cardiomegaly, atherosclerosis and prior sternotomy     CT Chest Without Contrast 02/12/2019   No acute intrathoracic process.  Severe atherosclerosis.  Status post CABG.  2 mm lingular micronodule.  For a solid nodule <6 mm, Fleischner Society 2017 guidelines recommend no routine follow up for a low risk patient, or follow-up with non-contrast chest CT at 12 months in a high risk patient.    XR CHEST PA AND LATERAL   02/07/2019   Degenerative changes are seen in the spine and there is anterior paravertebral ossification suggesting diffuse idiopathic skeletal hyperostosis.  Aorta is tortuous and there is evidence of prior median sternotomy.  The heart is enlarged.  No confluent infiltrates and there is no evidence of overt cardiac decompensation.       CT CHEST WITHOUT CONTRAST Date: 11/19/2018   Impression   1. Resolving left lower lobe pneumonia.  2. New airspace and ground-glass disease in the right lung which could reflect pneumonia or aspiration.  3. Small bilateral pleural effusions are new.  4. Redemonstrated pleuroparenchymal nodule at the right lung apex.  As discussed previously, additional follow-up is recommended.  5. Cardiomegaly and coronary artery disease.  6.  Enlarged pulmonary arterial trunk which can be seen in the setting of pulmonary hypertension.  7. Ascending aorta ectasia.            Labs reviewed       Lab Results   Component Value Date    WBC 6.30 01/29/2019    RBC 3.77 (L)  01/29/2019    HGB 11.5 (L) 01/29/2019    HCT 34.9 (L) 01/29/2019    MCV 93 01/29/2019    MCH 30.5 01/29/2019    MCHC 33.0 01/29/2019    RDW 15.4 (H) 01/29/2019     01/29/2019    MPV 7.1 (L) 01/29/2019    GRAN 4.0 01/29/2019    GRAN 63.2 01/29/2019    LYMPH 1.5 01/29/2019    LYMPH 23.6 01/29/2019    MONO 0.5 01/29/2019    MONO 8.1 01/29/2019    EOS 0.3 01/29/2019    BASO 0.00 01/29/2019    EOSINOPHIL 4.7 01/29/2019    BASOPHIL 0.4 01/29/2019         PFT results reviewed  Pulmonary Functions Testing Results:  PFT results reviewed Pulmonary Functions, including spirometry and bronchodilator response and lung volumes and diffusion, study was done 1/10/18.  Spirometry shows loss of vital capacity and fev1 with no obstruction and no bronchodilator response.   FEV1 is 71% or 2.29 liters.  Lung volumes show  normal TLC.  Diffusion shows reduced but falls within normal range when corrected for lung volumes.     Pulmonary functions show low vital capacity but otherwise normal. MVV is lower than expected and could be from neuromuscular disease?  Clinical correlation recommended.     Mihir Guillen M.D.      Plan:  Clinical impression is resonably certain and repeated evaluation prn +/- follow up will be needed as below.    Micheal was seen today for pneumonia, shortness of breath and cough.    Diagnoses and all orders for this visit:    Lobular pneumonia    Mild persistent asthma with acute exacerbation        Follow-up if symptoms worsen or fail to improve.    Discussed with patient above for education the following:      Patient Instructions   Breathing not improved with outpatient therapy. Chest x-ray shows sign of possible pneumonia.   Will need hospitalization for further evaluation and treatment.

## 2019-03-11 NOTE — PLAN OF CARE
PRN Tx given     03/11/19 1411   Patient Assessment/Suction   Level of Consciousness (AVPU) alert   Respiratory Effort Unlabored   Expansion/Accessory Muscles/Retractions no use of accessory muscles   All Lung Fields Breath Sounds wheezes, expiratory   LEYLA Breath Sounds diminished   LLL Breath Sounds crackles, fine   RUL Breath Sounds diminished   RML Breath Sounds crackles, fine   RLL Breath Sounds crackles, fine   PRE-TX-O2   O2 Device (Oxygen Therapy) room air   SpO2 99 %   Pulse Oximetry Type Intermittent   $ Pulse Oximetry - Multiple Charge Pulse Oximetry - Multiple   Pulse 79   Resp 19   Aerosol Therapy   $ Aerosol Therapy Charges Aerosol Treatment   Respiratory Treatment Status (SVN) given   Treatment Route (SVN) mask   Patient Position (SVN) sitting on edge of bed   Signs of Intolerance (SVN) none   Breath Sounds Post-Respiratory Treatment   Throughout All Fields Post-Treatment All Fields   Throughout All Fields Post-Treatment no change   Post-treatment Heart Rate (beats/min) 69   Post-treatment Resp Rate (breaths/min) 18   .

## 2019-03-11 NOTE — PROGRESS NOTES
HD:  Pt stable, tx complete, lines reinfused, needles removed, hemostasis achieved, + thrill and bruit post tx.  Pt tolerated tx well.      NET UF:  1945 mL

## 2019-03-12 PROBLEM — N18.6 ESRD (END STAGE RENAL DISEASE): Status: ACTIVE | Noted: 2019-03-12

## 2019-03-12 PROBLEM — I48.91 ATRIAL FIBRILLATION: Status: ACTIVE | Noted: 2019-03-12

## 2019-03-12 PROBLEM — I47.29 NSVT (NONSUSTAINED VENTRICULAR TACHYCARDIA): Status: ACTIVE | Noted: 2019-03-12

## 2019-03-12 LAB
ALBUMIN SERPL BCP-MCNC: 3.4 G/DL
ALP SERPL-CCNC: 93 U/L
ALT SERPL W/O P-5'-P-CCNC: 112 U/L
ANION GAP SERPL CALC-SCNC: 13 MMOL/L
AORTIC ROOT ANNULUS: 3.5 CM
AORTIC VALVE CUSP SEPERATION: 0.7 CM
APTT BLDCRRT: 24.8 SEC
AST SERPL-CCNC: 26 U/L
AV INDEX (PROSTH): 0.56
AV MEAN GRADIENT: 14.46 MMHG
AV PEAK GRADIENT: 24.4 MMHG
AV VALVE AREA: 2.73 CM2
AV VELOCITY RATIO: 0.51
BASOPHILS # BLD AUTO: 0 K/UL
BASOPHILS NFR BLD: 0 %
BILIRUB SERPL-MCNC: 0.5 MG/DL
BNP SERPL-MCNC: 1176 PG/ML
BSA FOR ECHO PROCEDURE: 2.36 M2
BUN SERPL-MCNC: 70 MG/DL
CALCIUM SERPL-MCNC: 7.8 MG/DL
CHLORIDE SERPL-SCNC: 97 MMOL/L
CO2 SERPL-SCNC: 24 MMOL/L
CREAT SERPL-MCNC: 4.4 MG/DL
CV ECHO LV RWT: 0.3 CM
DIFFERENTIAL METHOD: ABNORMAL
DOP CALC AO PEAK VEL: 2.47 M/S
DOP CALC AO VTI: 49.35 CM
DOP CALC LVOT AREA: 4.91 CM2
DOP CALC LVOT DIAMETER: 2.5 CM
DOP CALC LVOT PEAK VEL: 1.25 M/S
DOP CALC LVOT STROKE VOLUME: 134.77 CM3
DOP CALCLVOT PEAK VEL VTI: 27.47 CM
E WAVE DECELERATION TIME: 190.06 MSEC
ECHO LV POSTERIOR WALL: 0.93 CM (ref 0.6–1.1)
EOSINOPHIL # BLD AUTO: 0 K/UL
EOSINOPHIL NFR BLD: 0 %
ERYTHROCYTE [DISTWIDTH] IN BLOOD BY AUTOMATED COUNT: 18.2 %
EST. GFR  (AFRICAN AMERICAN): 14 ML/MIN/1.73 M^2
EST. GFR  (NON AFRICAN AMERICAN): 12 ML/MIN/1.73 M^2
FRACTIONAL SHORTENING: 27 % (ref 28–44)
GLUCOSE SERPL-MCNC: 238 MG/DL
HAV IGM SERPL QL IA: NEGATIVE
HBV CORE IGM SERPL QL IA: NEGATIVE
HBV SURFACE AG SERPL QL IA: NEGATIVE
HCT VFR BLD AUTO: 32.5 %
HCV AB SERPL QL IA: NEGATIVE
HGB BLD-MCNC: 10.8 G/DL
INR PPP: 1.1
INTERVENTRICULAR SEPTUM: 0.82 CM (ref 0.6–1.1)
IVRT: 0.09 MSEC
LEFT ATRIUM SIZE: 5.19 CM
LEFT INTERNAL DIMENSION IN SYSTOLE: 4.61 CM (ref 2.1–4)
LEFT VENTRICLE DIASTOLIC VOLUME INDEX: 86.95 ML/M2
LEFT VENTRICLE DIASTOLIC VOLUME: 201.45 ML
LEFT VENTRICLE MASS INDEX: 97.8 G/M2
LEFT VENTRICLE SYSTOLIC VOLUME INDEX: 42.3 ML/M2
LEFT VENTRICLE SYSTOLIC VOLUME: 97.9 ML
LEFT VENTRICULAR INTERNAL DIMENSION IN DIASTOLE: 6.3 CM (ref 3.5–6)
LEFT VENTRICULAR MASS: 226.58 G
LYMPHOCYTES # BLD AUTO: 0.5 K/UL
LYMPHOCYTES NFR BLD: 5.5 %
MAGNESIUM SERPL-MCNC: 2 MG/DL
MCH RBC QN AUTO: 31.3 PG
MCHC RBC AUTO-ENTMCNC: 33.1 G/DL
MCV RBC AUTO: 95 FL
MONOCYTES # BLD AUTO: 0.3 K/UL
MONOCYTES NFR BLD: 2.7 %
NEUTROPHILS # BLD AUTO: 9.1 K/UL
NEUTROPHILS NFR BLD: 91.8 %
PHOSPHATE SERPL-MCNC: 5.5 MG/DL
PISA TR MAX VEL: 2.97 M/S
PLATELET # BLD AUTO: 130 K/UL
PMV BLD AUTO: 8.2 FL
POTASSIUM SERPL-SCNC: 4.1 MMOL/L
PROCALCITONIN SERPL IA-MCNC: 0.16 NG/ML
PROT SERPL-MCNC: 5.8 G/DL
PROTHROMBIN TIME: 10.8 SEC
PV PEAK VELOCITY: 0.69 CM/S
RA PRESSURE: 15 MMHG
RBC # BLD AUTO: 3.44 M/UL
RIGHT VENTRICULAR END-DIASTOLIC DIMENSION: 4.46 CM
SODIUM SERPL-SCNC: 134 MMOL/L
TDI LATERAL: 0.12
TDI SEPTAL: 0.08
TDI: 0.1
TR MAX PG: 35.28 MMHG
TRICUSPID ANNULAR PLANE SYSTOLIC EXCURSION: 2.96 CM
TROPONIN I SERPL DL<=0.01 NG/ML-MCNC: 0.06 NG/ML
TV REST PULMONARY ARTERY PRESSURE: 50 MMHG
WBC # BLD AUTO: 9.9 K/UL

## 2019-03-12 PROCEDURE — 25000003 PHARM REV CODE 250: Performed by: INTERNAL MEDICINE

## 2019-03-12 PROCEDURE — 25000242 PHARM REV CODE 250 ALT 637 W/ HCPCS: Performed by: INTERNAL MEDICINE

## 2019-03-12 PROCEDURE — 84100 ASSAY OF PHOSPHORUS: CPT

## 2019-03-12 PROCEDURE — 25000003 PHARM REV CODE 250: Performed by: NURSE PRACTITIONER

## 2019-03-12 PROCEDURE — 63600175 PHARM REV CODE 636 W HCPCS: Performed by: INTERNAL MEDICINE

## 2019-03-12 PROCEDURE — 84145 PROCALCITONIN (PCT): CPT

## 2019-03-12 PROCEDURE — 36415 COLL VENOUS BLD VENIPUNCTURE: CPT

## 2019-03-12 PROCEDURE — 94640 AIRWAY INHALATION TREATMENT: CPT

## 2019-03-12 PROCEDURE — 84484 ASSAY OF TROPONIN QUANT: CPT

## 2019-03-12 PROCEDURE — 85025 COMPLETE CBC W/AUTO DIFF WBC: CPT

## 2019-03-12 PROCEDURE — 83735 ASSAY OF MAGNESIUM: CPT

## 2019-03-12 PROCEDURE — 94761 N-INVAS EAR/PLS OXIMETRY MLT: CPT

## 2019-03-12 PROCEDURE — 99900035 HC TECH TIME PER 15 MIN (STAT)

## 2019-03-12 PROCEDURE — 12000002 HC ACUTE/MED SURGE SEMI-PRIVATE ROOM

## 2019-03-12 PROCEDURE — 63600175 PHARM REV CODE 636 W HCPCS: Performed by: NURSE PRACTITIONER

## 2019-03-12 PROCEDURE — 99231 PR SUBSEQUENT HOSPITAL CARE,LEVL I: ICD-10-PCS | Mod: ,,, | Performed by: INTERNAL MEDICINE

## 2019-03-12 PROCEDURE — 99231 SBSQ HOSP IP/OBS SF/LOW 25: CPT | Mod: ,,, | Performed by: INTERNAL MEDICINE

## 2019-03-12 PROCEDURE — 85610 PROTHROMBIN TIME: CPT

## 2019-03-12 PROCEDURE — 80053 COMPREHEN METABOLIC PANEL: CPT

## 2019-03-12 PROCEDURE — 83880 ASSAY OF NATRIURETIC PEPTIDE: CPT

## 2019-03-12 PROCEDURE — 85730 THROMBOPLASTIN TIME PARTIAL: CPT

## 2019-03-12 RX ORDER — ZOLPIDEM TARTRATE 5 MG/1
5 TABLET ORAL NIGHTLY PRN
Status: DISCONTINUED | OUTPATIENT
Start: 2019-03-12 | End: 2019-03-15 | Stop reason: HOSPADM

## 2019-03-12 RX ORDER — LOSARTAN POTASSIUM 25 MG/1
100 TABLET ORAL DAILY
Status: DISCONTINUED | OUTPATIENT
Start: 2019-03-12 | End: 2019-03-15 | Stop reason: HOSPADM

## 2019-03-12 RX ORDER — ALBUTEROL SULFATE 2.5 MG/.5ML
2.5 SOLUTION RESPIRATORY (INHALATION) EVERY 6 HOURS PRN
Status: DISCONTINUED | OUTPATIENT
Start: 2019-03-12 | End: 2019-03-13

## 2019-03-12 RX ORDER — ALBUTEROL SULFATE 90 UG/1
1 AEROSOL, METERED RESPIRATORY (INHALATION) EVERY 6 HOURS PRN
Status: DISCONTINUED | OUTPATIENT
Start: 2019-03-12 | End: 2019-03-12 | Stop reason: SDUPTHER

## 2019-03-12 RX ORDER — TRAMADOL HYDROCHLORIDE 50 MG/1
50 TABLET ORAL EVERY 6 HOURS PRN
Status: DISCONTINUED | OUTPATIENT
Start: 2019-03-12 | End: 2019-03-15 | Stop reason: HOSPADM

## 2019-03-12 RX ORDER — GABAPENTIN 300 MG/1
300 CAPSULE ORAL DAILY
Status: DISCONTINUED | OUTPATIENT
Start: 2019-03-12 | End: 2019-03-12

## 2019-03-12 RX ORDER — FLUTICASONE FUROATE AND VILANTEROL 100; 25 UG/1; UG/1
1 POWDER RESPIRATORY (INHALATION) DAILY
Status: DISCONTINUED | OUTPATIENT
Start: 2019-03-13 | End: 2019-03-15 | Stop reason: HOSPADM

## 2019-03-12 RX ORDER — TAMSULOSIN HYDROCHLORIDE 0.4 MG/1
0.4 CAPSULE ORAL DAILY
Status: DISCONTINUED | OUTPATIENT
Start: 2019-03-12 | End: 2019-03-15 | Stop reason: HOSPADM

## 2019-03-12 RX ORDER — TORSEMIDE 20 MG/1
20 TABLET ORAL DAILY
Status: DISCONTINUED | OUTPATIENT
Start: 2019-03-13 | End: 2019-03-12

## 2019-03-12 RX ORDER — TORSEMIDE 20 MG/1
20 TABLET ORAL DAILY
Status: DISCONTINUED | OUTPATIENT
Start: 2019-03-12 | End: 2019-03-12

## 2019-03-12 RX ORDER — CARVEDILOL 6.25 MG/1
6.25 TABLET ORAL 2 TIMES DAILY WITH MEALS
Status: DISCONTINUED | OUTPATIENT
Start: 2019-03-12 | End: 2019-03-12

## 2019-03-12 RX ORDER — WARFARIN SODIUM 5 MG/1
5 TABLET ORAL DAILY
Status: DISCONTINUED | OUTPATIENT
Start: 2019-03-12 | End: 2019-03-14

## 2019-03-12 RX ORDER — HEPARIN SODIUM,PORCINE/D5W 25000/250
12 INTRAVENOUS SOLUTION INTRAVENOUS CONTINUOUS
Status: DISCONTINUED | OUTPATIENT
Start: 2019-03-12 | End: 2019-03-14

## 2019-03-12 RX ORDER — TORSEMIDE 20 MG/1
20 TABLET ORAL 2 TIMES DAILY
Status: DISCONTINUED | OUTPATIENT
Start: 2019-03-12 | End: 2019-03-15 | Stop reason: HOSPADM

## 2019-03-12 RX ORDER — CARVEDILOL 6.25 MG/1
6.25 TABLET ORAL 2 TIMES DAILY
Status: DISCONTINUED | OUTPATIENT
Start: 2019-03-12 | End: 2019-03-15 | Stop reason: HOSPADM

## 2019-03-12 RX ORDER — MIRTAZAPINE 7.5 MG/1
7.5 TABLET, FILM COATED ORAL NIGHTLY
Status: DISCONTINUED | OUTPATIENT
Start: 2019-03-12 | End: 2019-03-15 | Stop reason: HOSPADM

## 2019-03-12 RX ORDER — LOSARTAN POTASSIUM 25 MG/1
100 TABLET ORAL DAILY
Status: DISCONTINUED | OUTPATIENT
Start: 2019-03-13 | End: 2019-03-12

## 2019-03-12 RX ADMIN — TAMSULOSIN HYDROCHLORIDE 0.4 MG: 0.4 CAPSULE ORAL at 08:03

## 2019-03-12 RX ADMIN — ATORVASTATIN CALCIUM 80 MG: 40 TABLET, FILM COATED ORAL at 08:03

## 2019-03-12 RX ADMIN — HYDRALAZINE HYDROCHLORIDE 10 MG: 20 INJECTION INTRAMUSCULAR; INTRAVENOUS at 05:03

## 2019-03-12 RX ADMIN — MIRTAZAPINE 7.5 MG: 7.5 TABLET ORAL at 09:03

## 2019-03-12 RX ADMIN — HYDROCODONE BITARTRATE AND ACETAMINOPHEN 1 TABLET: 5; 325 TABLET ORAL at 05:03

## 2019-03-12 RX ADMIN — ASPIRIN 81 MG: 81 TABLET, COATED ORAL at 08:03

## 2019-03-12 RX ADMIN — CARVEDILOL 6.25 MG: 6.25 TABLET, FILM COATED ORAL at 08:03

## 2019-03-12 RX ADMIN — LOSARTAN POTASSIUM 100 MG: 25 TABLET, FILM COATED ORAL at 08:03

## 2019-03-12 RX ADMIN — WARFARIN SODIUM 5 MG: 5 TABLET ORAL at 10:03

## 2019-03-12 RX ADMIN — ALLOPURINOL 100 MG: 100 TABLET ORAL at 08:03

## 2019-03-12 RX ADMIN — METHYLPREDNISOLONE SODIUM SUCCINATE 62.5 MG: 125 INJECTION, POWDER, FOR SOLUTION INTRAMUSCULAR; INTRAVENOUS at 05:03

## 2019-03-12 RX ADMIN — TRAMADOL HYDROCHLORIDE 50 MG: 50 TABLET, FILM COATED ORAL at 08:03

## 2019-03-12 RX ADMIN — PANTOPRAZOLE SODIUM 40 MG: 40 TABLET, DELAYED RELEASE ORAL at 08:03

## 2019-03-12 RX ADMIN — TORSEMIDE 20 MG: 20 TABLET ORAL at 08:03

## 2019-03-12 RX ADMIN — IPRATROPIUM BROMIDE AND ALBUTEROL SULFATE 3 ML: .5; 3 SOLUTION RESPIRATORY (INHALATION) at 02:03

## 2019-03-12 RX ADMIN — TORSEMIDE 20 MG: 20 TABLET ORAL at 09:03

## 2019-03-12 RX ADMIN — HEPARIN SODIUM 5000 UNITS: 5000 INJECTION, SOLUTION INTRAVENOUS; SUBCUTANEOUS at 08:03

## 2019-03-12 RX ADMIN — CEFTRIAXONE 1 G: 1 INJECTION, SOLUTION INTRAVENOUS at 05:03

## 2019-03-12 RX ADMIN — HEPARIN SODIUM 12 UNITS/KG/HR: 10000 INJECTION, SOLUTION INTRAVENOUS at 09:03

## 2019-03-12 RX ADMIN — CARVEDILOL 6.25 MG: 6.25 TABLET, FILM COATED ORAL at 09:03

## 2019-03-12 RX ADMIN — GABAPENTIN 300 MG: 300 CAPSULE ORAL at 09:03

## 2019-03-12 RX ADMIN — METHYLPREDNISOLONE SODIUM SUCCINATE 62.5 MG: 125 INJECTION, POWDER, FOR SOLUTION INTRAMUSCULAR; INTRAVENOUS at 09:03

## 2019-03-12 RX ADMIN — FINASTERIDE 5 MG: 5 TABLET, FILM COATED ORAL at 08:03

## 2019-03-12 RX ADMIN — ACETAMINOPHEN 650 MG: 325 TABLET ORAL at 05:03

## 2019-03-12 RX ADMIN — METHYLPREDNISOLONE SODIUM SUCCINATE 62.5 MG: 125 INJECTION, POWDER, FOR SOLUTION INTRAMUSCULAR; INTRAVENOUS at 03:03

## 2019-03-12 RX ADMIN — AZITHROMYCIN MONOHYDRATE 500 MG: 500 INJECTION, POWDER, LYOPHILIZED, FOR SOLUTION INTRAVENOUS at 07:03

## 2019-03-12 RX ADMIN — ISOSORBIDE MONONITRATE 10 MG: 10 TABLET ORAL at 09:03

## 2019-03-12 NOTE — ASSESSMENT & PLAN NOTE
Consult Nephrology for end-stage renal disease management.  Resume hemodialysis on his routine schedule.

## 2019-03-12 NOTE — ASSESSMENT & PLAN NOTE
Current and atrial fibrillation on telemetry.  No recent EKG demonstrates telemetry will consult Cardiology.  Current lead not on oral anticoagulant.

## 2019-03-12 NOTE — PLAN OF CARE
The pt stated that he lives at home alone. He has the assistance of his daughters. The pt denies HH but is interested in some HH services for a little while. I told him that I would update Dr. Olivas to see if he will order HH. The pt has a home inhaler and nebulizer. The pt attends HD on MWF at [facility]. Verified PCP as Dr. Lakhani and insurance as medicare and [employer]. Viktoria mC LMSW     03/12/19 1247   Discharge Assessment   Assessment Type Discharge Planning Assessment   Confirmed/corrected address and phone number on facesheet? Yes   Assessment information obtained from? Patient   Communicated expected length of stay with patient/caregiver no   Prior to hospitilization cognitive status: Alert/Oriented   Prior to hospitalization functional status: Independent   Current cognitive status: Alert/Oriented   Current Functional Status: Independent   Lives With alone   Able to Return to Prior Arrangements no   Is patient able to care for self after discharge? Yes   Who are your caregiver(s) and their phone number(s)? Yaquelin Hunt [phone]   Readmission Within the Last 30 Days no previous admission in last 30 days   Patient currently being followed by outpatient case management? No   Patient currently receives any other outside agency services? No   Equipment Currently Used at Home nebulizer;cane, straight  (Inhaler )   Do you have any problems affording any of your prescribed medications? No   Is the patient taking medications as prescribed? yes   Does the patient have transportation home? Yes   Transportation Anticipated family or friend will provide   Does the patient receive services at the [facility]? No   Discharge Plan A Home;Home Health   Discharge Plan B Home   DME Needed Upon Discharge  none   Patient/Family in Agreement with Plan yes

## 2019-03-12 NOTE — SUBJECTIVE & OBJECTIVE
Interval History:  No new issues overnight still with dyspnea on exertion although shortness of breath is better.  Staff reports patient with nonsustained V-tach-7 beat run.    Review of Systems   Constitutional: Positive for activity change, appetite change and fatigue. Negative for chills.   HENT: Negative for congestion, hearing loss and sore throat.    Respiratory: Positive for cough, shortness of breath and wheezing.    Cardiovascular: Positive for leg swelling. Negative for chest pain and palpitations.   Gastrointestinal: Negative for abdominal pain and nausea.   Musculoskeletal: Positive for arthralgias. Negative for back pain and myalgias.   Neurological: Negative for dizziness, syncope and light-headedness.   Psychiatric/Behavioral: Negative for agitation and confusion. The patient is not nervous/anxious.      Objective:     Vital Signs (Most Recent):  Temp: 97.8 °F (36.6 °C) (03/12/19 1139)  Pulse: 79 (03/12/19 1425)  Resp: 18 (03/12/19 1425)  BP: 135/88 (03/12/19 1139)  SpO2: 98 % (03/12/19 1425) Vital Signs (24h Range):  Temp:  [96.8 °F (36 °C)-98 °F (36.7 °C)] 97.8 °F (36.6 °C)  Pulse:  [67-97] 79  Resp:  [18-20] 18  SpO2:  [94 %-99 %] 98 %  BP: (131-189)/() 135/88     Weight: 108.1 kg (238 lb 5.1 oz)  Body mass index is 31.44 kg/m².    Intake/Output Summary (Last 24 hours) at 3/12/2019 1514  Last data filed at 3/12/2019 1200  Gross per 24 hour   Intake 1460 ml   Output 4970 ml   Net -3510 ml      Physical Exam   Constitutional: He is oriented to person, place, and time. He appears well-developed and well-nourished.   HENT:   Head: Normocephalic and atraumatic.   Nose: Nose normal.   Mouth/Throat: Oropharynx is clear and moist.   Eyes: Conjunctivae and EOM are normal. Pupils are equal, round, and reactive to light.   Neck: Normal range of motion. Neck supple.   Cardiovascular: Normal rate, regular rhythm and intact distal pulses.   Murmur heard.  Irregular irregular.  Trace pretibial edema    Pulmonary/Chest: Effort normal. He has wheezes.   Course with few expiratory wheezes   Abdominal: Soft. Bowel sounds are normal. There is no tenderness.   Musculoskeletal: Normal range of motion.   Neurological: He is alert and oriented to person, place, and time. Coordination normal.   Skin: Skin is warm and dry.   Av fistula positive thrill and bruit to left upper extremity   Psychiatric: He has a normal mood and affect. His behavior is normal.   Nursing note and vitals reviewed.      Significant Labs:   BMP:   Recent Labs   Lab 03/12/19  0624   *   *   K 4.1   CL 97   CO2 24   BUN 70*   CREATININE 4.4*   CALCIUM 7.8*   MG 2.0     CBC: No results for input(s): WBC, HGB, HCT, PLT in the last 48 hours.    Significant Imaging: I have reviewed and interpreted all pertinent imaging results/findings within the past 24 hours.

## 2019-03-12 NOTE — PROGRESS NOTES
[facility] Hospital Medicine  Progress Note    Patient Name: Micheal Hunt  MRN: [id number]  Patient Class: IP- Inpatient   Admission Date: 3/11/2019  Length of Stay: 1 days  Attending Physician: Charissa Olivas MD  Primary Care Provider: Pk Lakhani MD        Subjective:     Principal Problem:Asthma exacerbation    HPI:  No notes on file    Hospital Course:  No notes on file    Interval History:  No new issues overnight still with dyspnea on exertion although shortness of breath is better.  Staff reports patient with nonsustained V-tach-7 beat run.    Review of Systems   Constitutional: Positive for activity change, appetite change and fatigue. Negative for chills.   HENT: Negative for congestion, hearing loss and sore throat.    Respiratory: Positive for cough, shortness of breath and wheezing.    Cardiovascular: Positive for leg swelling. Negative for chest pain and palpitations.   Gastrointestinal: Negative for abdominal pain and nausea.   Musculoskeletal: Positive for arthralgias. Negative for back pain and myalgias.   Neurological: Negative for dizziness, syncope and light-headedness.   Psychiatric/Behavioral: Negative for agitation and confusion. The patient is not nervous/anxious.      Objective:     Vital Signs (Most Recent):  Temp: 97.8 °F (36.6 °C) (03/12/19 1139)  Pulse: 79 (03/12/19 1425)  Resp: 18 (03/12/19 1425)  BP: 135/88 (03/12/19 1139)  SpO2: 98 % (03/12/19 1425) Vital Signs (24h Range):  Temp:  [96.8 °F (36 °C)-98 °F (36.7 °C)] 97.8 °F (36.6 °C)  Pulse:  [67-97] 79  Resp:  [18-20] 18  SpO2:  [94 %-99 %] 98 %  BP: (131-189)/() 135/88     Weight: 108.1 kg (238 lb 5.1 oz)  Body mass index is 31.44 kg/m².    Intake/Output Summary (Last 24 hours) at 3/12/2019 1514  Last data filed at 3/12/2019 1200  Gross per 24 hour   Intake 1460 ml   Output 4970 ml   Net -3510 ml      Physical Exam   Constitutional: He is oriented to person, place, and time. He appears well-developed  and well-nourished.   HENT:   Head: Normocephalic and atraumatic.   Nose: Nose normal.   Mouth/Throat: Oropharynx is clear and moist.   Eyes: Conjunctivae and EOM are normal. Pupils are equal, round, and reactive to light.   Neck: Normal range of motion. Neck supple.   Cardiovascular: Normal rate, regular rhythm and intact distal pulses.   Murmur heard.  Irregular irregular.  Trace pretibial edema   Pulmonary/Chest: Effort normal. He has wheezes.   Course with few expiratory wheezes   Abdominal: Soft. Bowel sounds are normal. There is no tenderness.   Musculoskeletal: Normal range of motion.   Neurological: He is alert and oriented to person, place, and time. Coordination normal.   Skin: Skin is warm and dry.   Av fistula positive thrill and bruit to left upper extremity   Psychiatric: He has a normal mood and affect. His behavior is normal.   Nursing note and vitals reviewed.      Significant Labs:   BMP:   Recent Labs   Lab 03/12/19  0624   *   *   K 4.1   CL 97   CO2 24   BUN 70*   CREATININE 4.4*   CALCIUM 7.8*   MG 2.0     CBC: No results for input(s): WBC, HGB, HCT, PLT in the last 48 hours.    Significant Imaging: I have reviewed and interpreted all pertinent imaging results/findings within the past 24 hours.    Assessment/Plan:      * Asthma exacerbation    Pulmonary consulted.  Initiated on scheduled breathing treatments, Flonase and steroids.       ESRD (end stage renal disease)    Consult Nephrology for end-stage renal disease management.  Resume hemodialysis on his routine schedule.       Atrial fibrillation    Current and atrial fibrillation on telemetry.  No recent EKG demonstrates telemetry will consult Cardiology.  Current lead not on oral anticoagulant.       Immune deficiency disorder    Workup for immunodeficiency disorder outpatient per pulmonology       Pneumonia of left lower lobe due to infectious organism    Initiated on IV antibiotics.  Procalcitonin pending  Will deescalate  antibiotics if procalcitonin negative.     Anemia due to chronic kidney disease, on chronic dialysis    Hemoglobin-hematocrit stable.  Trend CBC       CAD (coronary artery disease), 2V CABG 2007    Chronic resume home statin and aspirin         VTE Risk Mitigation (From admission, onward)        Ordered     heparin (porcine) injection 5,000 Units  Every 12 hours      03/11/19 1059     IP VTE HIGH RISK PATIENT  Once      03/11/19 1059              Bell Wilkinson NP  Department of Hospital Medicine   [facility]

## 2019-03-12 NOTE — NURSING
Asked pt about allergy to losartan, he states he does not have an allergy, he stropped taking because he saw a recall on tv. Only stopped taking in Feburary. He said he wanted to try to take it to get his blood pressure down

## 2019-03-12 NOTE — ASSESSMENT & PLAN NOTE
Initiated on IV antibiotics.  Procalcitonin pending  Will deescalate antibiotics if procalcitonin negative.

## 2019-03-12 NOTE — PLAN OF CARE
Problem: Adult Inpatient Plan of Care  Goal: Plan of Care Review  Outcome: Ongoing (interventions implemented as appropriate)  AAOx4. BP elevated, received orders from on call NP for BP medication, also gave dose of PRN BP medication. Will continue to monitor. Plan of care reviewed with patient. Safety maintained throughout shift. Bed locked and low, SRx2, call light within reach. IV steroid given per orders. Wheezing heard, room air, normal breathing pattern. No c/o of SOB. C/o pain to left lower back side. Telemetry monitoring, afib.  Hourly/q2 rounding completed this shift for pt safety. Will continue to monitor.

## 2019-03-12 NOTE — PROGRESS NOTES
Progress Note  PULMONARY    Admit Date: 3/11/2019   03/12/2019      SUBJECTIVE:     March 13- better but still wheezes, no distress,      PFSH and Allergies reviewed.    OBJECTIVE:     Vitals (Most recent):  Vitals:    03/12/19 1600   BP: (!) 147/78   Pulse:    Resp:    Temp:        Vitals (24 hour range):  Temp:  [97.5 °F (36.4 °C)-98 °F (36.7 °C)]   Pulse:  [67-97]   Resp:  [18-20]   BP: (135-189)/()   SpO2:  [94 %-99 %]       Intake/Output Summary (Last 24 hours) at 3/12/2019 1839  Last data filed at 3/12/2019 1800  Gross per 24 hour   Intake 1440 ml   Output 2525 ml   Net -1085 ml          Physical Exam:  The patient's neuro status (alertness,orientation,cognitive function,motor skills,), pharyngeal exam (oral lesions, hygiene, abn dentition,), Neck (jvd,mass,thyroid,nodes in neck and above/below clavicle),RESPIRATORY(symmetry,effort,fremitus,percussion,auscultation),  Cor(rhythm,heart tones including gallops,perfusion,edema)ABD(distention,hepatic&splenomegaly,tenderness,masses), Skin(rash,cyanosis),Psyc(affect,judgement,).  Exam negative except for these pertinent findings:    Alert, no distress, faint wheezes, symmetric,no edema     Radiographs reviewed: view by direct vision  No new  Results for orders placed during the hospital encounter of 11/05/18   X-Ray Chest 1 View    Narrative EXAMINATION:  XR CHEST 1 VIEW    CLINICAL HISTORY:  cough;    TECHNIQUE:  Single frontal view of the chest was performed.    COMPARISON:  11/5/2018    FINDINGS:  The cardiomediastinal silhouette is stable..  There are median sternotomy sutures.  Thoracic aorta is tortuous    The left lower lobe infiltrate with best seen on the lateral view on the prior exam and follow-up to include lateral view is suggested.  As visualized on the frontal view it appears decreased.    Right lung remains clear no effusion.      Impression Decrease of the left basilar infiltrate compared to the prior exam.  Suggest follow-up study to include  lateral view as it is best seen on the lateral view.      Electronically signed by: Mayuri Castro MD  Date:    11/08/2018  Time:    14:19   ]    Labs     No results for input(s): WBC, HGB, HCT, PLT, BAND, METAMYELOCYT, MYELOPCT, HGBA1C in the last 24 hours.  Recent Labs   Lab 03/12/19  0624   *   K 4.1   CL 97   CO2 24   BUN 70*   CREATININE 4.4*   *   CALCIUM 7.8*   MG 2.0   PHOS 5.5*   AST 26   *   ALKPHOS 93   BILITOT 0.5   PROT 5.8*   ALBUMIN 3.4*   PROCAL 0.16   No results for input(s): PH, PCO2, PO2, HCO3 in the last 24 hours.  Microbiology Results (last 7 days)     Procedure Component Value Units Date/Time    Culture, Respiratory [[id number]]     Order Status:  No result Specimen:  Respiratory from Sputum, Expectorated           Impression:  Active Hospital Problems    Diagnosis  POA    *Asthma exacerbation [J45.901]  Yes    Atrial fibrillation [I48.91]  Yes    ESRD (end stage renal disease) [N18.6]  Yes    NSVT (nonsustained ventricular tachycardia) [I47.2]  No    Pneumonia of left lower lobe due to infectious organism [J18.1]  Yes    Immune deficiency disorder [D84.9]  Yes    Aortic calcification [I70.0]  Yes     Defer to primary control of cholesterol and bp.          Anemia due to chronic kidney disease, on chronic dialysis [N18.6, D63.1, Z99.2]  Not Applicable    CAD (coronary artery disease), 2V CABG 2007 [I25.10]  Yes      Resolved Hospital Problems   No resolved problems to display.               Plan:   March 12, doing well, asthma and immune deficiency.  outpt soon reasonable. Pneumonia better.                                      .

## 2019-03-12 NOTE — CONSULTS
Nephrology Consult Progress Note        Patient Name: Micheal Hunt  MRN: [id number]    Patient Class: IP- Inpatient   Admission Date: 3/11/2019  Length of Stay: 1 days  Date of Service: 3/12/2019    Attending Physician: Charissa Olivas MD  Primary Care Provider: Pk Lakhani MD    Reason for Consult: esrd    SUBJECTIVE:     HPI: 78M recently started on HD MWF in [place], [place] admitted to Hospitalist Service from Dr. Guillen's office with complaint of persisting asthma exacerbation, failing outpatient steroids and abx therapy, now diagnosed with PNA. Renal consulted for co-management.    3/11 Seen and examined on machine today, tolerating well, no complains other than dry cough.  3/12 VSS, feels better after HD yesterday, still coughing. Next HD in AM.    Past Medical History:   Diagnosis Date    NICK (acute kidney injury) 7/29/2016    Allergy     Anemia, mild 12/15/2014    Arthritis     Gout    Benign essential HTN 3/27/2012    BMI 29.0-29.9,adult 5/10/2018    BPH (benign prostatic hyperplasia)     BPH (benign prostatic hyperplasia)     CAD (coronary artery disease) 2006    Chronic kidney disease     due to ibuprofen    Colon polyp     CRF (chronic renal failure), stage 5     Diverticulosis     Gastritis     GERD (gastroesophageal reflux disease)     Gout     History of colon polyps 5/3/2018    HTN (hypertension) 3/27/2012    Hyperlipidemia     Hyperlipidemia     LLL pneumonia 6/14/2018    LVH (left ventricular hypertrophy)     Mesenteric ischemia     Murmur, cardiac 3/27/2012    GRICELDA (obstructive sleep apnea)     DOES NOT USE A MACHINE    Sinus problem     Syncope and collapse      Past Surgical History:   Procedure Laterality Date    APPENDECTOMY      BLOCK-NERVE Left 5/31/2016    [facility]    CARDIAC CATHETERIZATION      COLONOSCOPY  2011    COLONOSCOPY N/A 5/3/2018    [facility]    COLONOSCOPY N/A 9/10/2015     [facility]O    CORONARY ARTERY BYPASS GRAFT  4/2007    x 1    CYSTOSCOPY N/A 2017    [facility]    CYSTOSCOPY N/A 11/10/2015    [facility] OR    ESOPHAGOGASTRODUODENOSCOPY (EGD) N/A 2016    [facility] ENDO (2ND FLR)    ESOPHAGOGASTRODUODENOSCOPY (EGD) N/A 10/15/2014    [facility] ENDO    INJECTION-STEROID-EPIDURAL-TRANSFORAMINAL Left 2016    [facility]    JOINT REPLACEMENT      left knee total replacement  X 3    mid leftt finger      from a cactuss    MOLE REMOVAL  2016    RADIOFREQUENCY THERMOCOAGULATION (RFTC)-NERVE-MEDIAN BRANCH-LUMBAR Left 2016    [facility] OR    rotative cuff      no rotative cuffs on bilat shoulders has pins     SHOULDER SURGERY      shoulder surgery bilat  RIGHT X 4; LEFT X 3    TRANSRECTAL ULTRASOUND GUIDED PROSTATE BIOPSY Bilateral 11/10/2015    [facility] OR    ULTRASOUND-ENDOSCOPIC-UPPER N/A 2017    [facility] ENDO (2ND FLR)    ULTRASOUND-ENDOSCOPIC-UPPER N/A 2016    Performed [facility] Tra [facility] at [facility] ENDO (2ND FLR)    ULTRASOUND-ENDOSCOPIC-UPPER N/A 2015    Performed by Jason Saleem [facility] at [facility] ENDO (2ND FLR)     Family History   Problem Relation Age of Onset    Heart disease Mother     Sudden death Father     Cancer Father         advanced lung ca- found after 2 story fall    Stroke Sister     Pneumonia Sister          from PNA    Heart disease Sister     Hypertension Brother     Kidney cancer Neg Hx     Prostate cancer Neg Hx     Urolithiasis Neg Hx     Allergic rhinitis Neg Hx     Allergies Neg Hx     Angioedema Neg Hx     Asthma Neg Hx     Atopy Neg Hx     Eczema Neg Hx     Immunodeficiency Neg Hx     Rhinitis Neg Hx     Urticaria Neg  Hx      Social History     Tobacco Use    Smoking status: Never Smoker    Smokeless tobacco: Never Used   Substance Use Topics    Alcohol use: No    Drug use: No       Review of patient's allergies indicates:   Allergen Reactions    Ace inhibitors Other (See Comments)     Cough    Arb-angiotensin receptor antagonist Itching    Eplerenone Other (See Comments)     Marked bradycardia, 40, tiredness and weakness      Sulfa (sulfonamide antibiotics) Itching     Patient says this was 10 years ago and doesn't remember what happened       Outpatient meds:  No current facility-administered medications on file prior to encounter.      Current Outpatient Medications on File Prior to Encounter   Medication Sig Dispense Refill    albuterol (PROVENTIL/VENTOLIN HFA) 90 mcg/actuation inhaler 2 puffs every 4 hours as needed for cough, wheeze, or shortness of breath 3 Inhaler 3    albuterol-ipratropium (DUO-NEB) 2.5 mg-0.5 mg/3 mL nebulizer solution Take 3 mLs by nebulization every 6 (six) hours as needed for Wheezing or Shortness of Breath. 270 mL 0    allopurinol (ZYLOPRIM) 100 MG tablet TAKE 1 TABLET BY MOUTH EVERY DAY 90 tablet 1    atorvastatin (LIPITOR) 80 MG tablet TAKE 1 TABLET (80 MG TOTAL) BY MOUTH ONCE DAILY. 90 tablet 3    azithromycin (ZITHROMAX) 500 MG tablet Take 1 tablet (500 mg total) by mouth once daily. 3 tablet 2    budesonide-formoterol 160-4.5 mcg (SYMBICORT) 160-4.5 mcg/actuation HFAA Inhale 2 puffs into the lungs every 12 (twelve) hours. Controller 3 Inhaler 4    carvedilol (COREG) 6.25 MG tablet Take 1 tablet (6.25 mg total) by mouth 2 (two) times daily with meals. 60 tablet 3    finasteride (PROSCAR) 5 mg tablet TAKE 1 TABLET ONCE DAILY. 90 tablet 3    fluticasone (FLONASE) 50 mcg/actuation nasal spray 2 sprays (100 mcg total) by Each Nare route once daily. 3 Bottle 3    gabapentin (NEURONTIN) 300 MG capsule Take 1 capsule (300 mg total) by mouth every evening. 90 capsule 3    isosorbide  mononitrate (ISMO,MONOKET) 10 mg tablet TAKE 1 TABLET BY MOUTH EVERY DAY IN THE EVENING 30 tablet 3    levoFLOXacin (LEVAQUIN) 500 MG tablet Take 1 tablet (500 mg total) by mouth once daily. 7 tablet 0    meclizine (ANTIVERT) 25 mg tablet TAKE 1 TABLET BY MOUTH THREE TIMES A DAY AS NEEDED 90 tablet 0    omeprazole (PRILOSEC) 20 MG capsule TAKE 1 CAPSULE BY MOUTH EVERY DAY 30 capsule 1    predniSONE (DELTASONE) 20 MG tablet Take one pill a day for three days, repeat for shortness of breath 9 tablet 2    predniSONE (DELTASONE) 20 MG tablet 3 pills for 3 days, 2 pills for 3 days, 1 pill for 3 days, repeat if needed. 36 tablet 0    sodium chloride (SALINE NASAL MIST) 3 % Mist 1 spray by Nasal route 2 (two) times daily.      tamsulosin (FLOMAX) 0.4 mg Cap TAKE 1 CAPSULE BY MOUTH EVERY DAY 30 capsule 2    tiotropium bromide (SPIRIVA RESPIMAT) 1.25 mcg/actuation Mist Inhale 2.5 mcg into the lungs once daily. Controller 4 g 5    torsemide (DEMADEX) 20 MG Tab Take 1 tablet (20 mg total) by mouth once daily. 30 tablet 5    traMADol (ULTRAM) 50 mg tablet Take 1 tablet (50 mg total) by mouth every 12 (twelve) hours as needed for Pain. 60 tablet 2    zolpidem (AMBIEN) 5 MG Tab TAKE 1 TABLET BY MOUTH EVERY EVENING AS NEEDED 30 tablet 3    zolpidem (AMBIEN) 5 MG Tab TAKE 1 TABLET BY MOUTH EVERY EVENING AS NEEDED 30 tablet 3    aspirin (ECOTRIN) 81 MG EC tablet Take 81 mg by mouth once daily.        celecoxib (CELEBREX) 200 MG capsule Take 1 capsule (200 mg total) by mouth once daily. 30 capsule 3    losartan (COZAAR) 100 MG tablet TAKE 1 TABLET BY MOUTH EVERY DAY 90 tablet 0    mirtazapine (REMERON) 7.5 MG Tab TAKE 1 TABLET BY MOUTH EVERY EVENING. 30 tablet 2    NITROSTAT 0.4 mg SL tablet PLACE 1 TABLET (0.4 MG TOTAL) UNDER THE TONGUE EVERY 5 (FIVE) MINUTES AS NEEDED FOR CHEST PAIN. 25 tablet 3       Scheduled meds:   allopurinol  100 mg Oral Daily    aspirin  81 mg Oral Daily    atorvastatin  80 mg Oral Daily     azithromycin  500 mg Intravenous Q24H    carvedilol  6.25 mg Oral BID    cefTRIAXone (ROCEPHIN) IVPB  1 g Intravenous Q24H    finasteride  5 mg Oral Daily    gabapentin  300 mg Oral QHS    heparin (porcine)  5,000 Units Subcutaneous Q12H    isosorbide mononitrate  10 mg Oral QHS    losartan  100 mg Oral Daily    methylPREDNISolone sodium succinate  62.5 mg Intravenous Q8H    pantoprazole  40 mg Oral Daily    tamsulosin  0.4 mg Oral Daily    torsemide  20 mg Oral Daily       Infusions:      PRN meds:  acetaminophen, albuterol-ipratropium, dextrose 50%, dextrose 50%, glucagon (human recombinant), glucose, glucose, hydrALAZINE, HYDROcodone-acetaminophen, magnesium oxide, magnesium oxide, ondansetron, polyethylene glycol, potassium chloride 10%, potassium chloride 10%, potassium, sodium phosphates, potassium, sodium phosphates, potassium, sodium phosphates, sodium chloride 0.9%, traMADol, zolpidem    Review of Systems:  ROS    OBJECTIVE:     Vital Signs and IO (Last 24H):  Temp:  [96.7 °F (35.9 °C)-98 °F (36.7 °C)]   Pulse:  [67-89]   Resp:  [18-20]   BP: (131-189)/()   SpO2:  [94 %-99 %]   I/O last 3 completed shifts:  In: 1820 [P.O.:1020; Other:500; IV Piggyback:300]  Out: 4270 [Urine:1825; Other:2445]    Wt Readings from Last 5 Encounters:   03/11/19 108.1 kg (238 lb 5.1 oz)   03/11/19 111.6 kg (246 lb 0.5 oz)   02/28/19 107 kg (236 lb)   02/26/19 107.5 kg (236 lb 15.9 oz)   02/26/19 107.5 kg (237 lb)         Physical Exam:  Physical Exam   Constitutional: He is oriented to person, place, and time. He appears well-developed and well-nourished.   HENT:   Head: Normocephalic and atraumatic.   Mouth/Throat: Oropharynx is clear and moist.   Eyes: EOM are normal. Pupils are equal, round, and reactive to light. No scleral icterus.   Neck: Neck supple.   Cardiovascular: Normal rate and regular rhythm.   Pulmonary/Chest: Effort normal. No stridor. No respiratory distress.   Abdominal: Soft. He exhibits  no distension.   Musculoskeletal: Normal range of motion. He exhibits no edema or deformity.   Neurological: He is alert and oriented to person, place, and time. No cranial nerve deficit.   Skin: Skin is warm and dry. No rash noted. He is not diaphoretic. No erythema.   Psychiatric: He has a normal mood and affect. His behavior is normal.       Body mass index is 31.44 kg/m².    Laboratory:  Recent Labs   Lab 03/11/19  1109 03/12/19  0624    134*   K 4.1 4.1    97   CO2 23 24   BUN 98* 70*   CREATININE 5.8* 4.4*   ESTGFRAFRICA 10* 14*   EGFRNONAA 9* 12*   CALCIUM 7.6* 7.8*   ALBUMIN 3.5 3.4*   PHOS 4.4 5.5*       No results for input(s): WBC, HGB, HCT, PLT, MCV, MCHC, NEUTOPHILPCT, MONO in the last 168 hours.    Invalid input(s):  EOSINOPHIL    Recent Labs   Lab 03/11/19  1109 03/12/19  0624   ALKPHOS 95 93   BILITOT 0.5 0.5   PROT 5.9* 5.8*   ALBUMIN 3.5 3.4*   * 112*   AST 49* 26       ASSESSMENT/PLAN:     Active Hospital Problems    Diagnosis  POA    Asthma exacerbation [J45.901]  Yes    Pneumonia of left lower lobe due to infectious organism [J18.1]  Yes    Immune deficiency disorder [D84.9]  Yes    Aortic calcification [I70.0]  Yes     Defer to primary control of cholesterol and bp.            Resolved Hospital Problems   No resolved problems to display.       ESRD on HD MWF via [facility]  Continue current dialysis prescription.  Next HD Wednesday or PRN.  Renal diet - low K, low phos.  No IVs or BP checks on access arm.    Anemia of CKD  Stable. Monitor.  No need for NIC.  No need for IV iron.    MBD / Secondary HPT  Monitor phos levels. Low phos diet.    HTN  Controlled.   Tolerate asymptomatic HTN up to -160.  Continue home meds.  Low sodium diet.    COPD/asthma exacerbation, PNA  Agree with abx and steroids, management per primary team.    Thank you for allowing us to participate in the care of your patient!   We will follow the patient and provide recommendations as  needed.    Darrel Cary MD    [facility]  [address] [place] [id number]    [phone] - tel  [phone] - fax    3/12/2019 12:14 PM

## 2019-03-12 NOTE — NURSING
Situation Principle Problem:  <principal problem not specified>      Reason for Calling: BP elevated, pt cramping    Provider Calling: Eryn Reed   Background Vitals:    03/11/19 1730 03/11/19 1740 03/11/19 1954 03/11/19 2014   BP: 137/78 137/60  (!) 178/100   BP Location:  Right arm  Right arm   Patient Position:  Sitting  Lying   Pulse: 71 79  75   Resp:  18  18   Temp:  96.8 °F (36 °C)  98 °F (36.7 °C)   TempSrc:  Tympanic  Oral   SpO2:   97% (!) 94%   Weight:  108.1 kg (238 lb 5.1 oz)         No results found for: POCTGLUCOSE    Intake/Output:    Intake/Output Summary (Last 24 hours) at 3/11/2019 2018  Last data filed at 3/11/2019 1905  Gross per 24 hour   Intake 1520 ml   Output 3295 ml   Net -1775 ml        Assessment What is happening: Patient was taking to dialysis today and about 2 L removed. He is complaining of cramping in his arms. Also patient's BP is 178/100. I was seeing if we could get his home medications reordered?   Response Provider Response: BP medication ordered

## 2019-03-12 NOTE — NURSING
Problem: Adult Inpatient Plan of Care  Goal: Plan of Care Review  Outcome: Ongoing (interventions implemented as appropriate)  Pt alert and oriented. Prn pain medication given as needed. LELAND Wilkinson np called for 7 beat run of [facility], cardiology consulted. ADRIANNA done. Medications given. Plan of care reviewed with patient. Education given. Will continue to monitor.

## 2019-03-12 NOTE — PROGRESS NOTES
03/12/19 0750   Patient Assessment/Suction   Level of Consciousness (AVPU) alert   Respiratory Effort Normal;Unlabored   All Lung Fields Breath Sounds diminished   PRE-TX-O2   O2 Device (Oxygen Therapy) room air   SpO2 96 %   Pulse Oximetry Type Intermittent   $ Pulse Oximetry - Multiple Charge Pulse Oximetry - Multiple   Pulse 97   Resp 18   Aerosol Therapy   $ Aerosol Therapy Charges PRN treatment not required   Respiratory Treatment Status (SVN) PRN treatment not required

## 2019-03-13 ENCOUNTER — TELEPHONE (OUTPATIENT)
Dept: FAMILY MEDICINE | Facility: CLINIC | Age: 79
End: 2019-03-13

## 2019-03-13 LAB
ALBUMIN SERPL BCP-MCNC: 3.1 G/DL
ALP SERPL-CCNC: 87 U/L
ALT SERPL W/O P-5'-P-CCNC: 107 U/L
ANION GAP SERPL CALC-SCNC: 15 MMOL/L
APTT BLDCRRT: 42.1 SEC
APTT BLDCRRT: 43.4 SEC
APTT BLDCRRT: NORMAL SEC
APTT BLDCRRT: NORMAL SEC
AST SERPL-CCNC: 22 U/L
BASOPHILS # BLD AUTO: 0 K/UL
BASOPHILS NFR BLD: 0 %
BILIRUB SERPL-MCNC: 0.4 MG/DL
BUN SERPL-MCNC: 89 MG/DL
CALCIUM SERPL-MCNC: 7.4 MG/DL
CHLORIDE SERPL-SCNC: 95 MMOL/L
CO2 SERPL-SCNC: 21 MMOL/L
CREAT SERPL-MCNC: 5 MG/DL
DIFFERENTIAL METHOD: ABNORMAL
EOSINOPHIL # BLD AUTO: 0 K/UL
EOSINOPHIL NFR BLD: 0 %
ERYTHROCYTE [DISTWIDTH] IN BLOOD BY AUTOMATED COUNT: 17.9 %
EST. GFR  (AFRICAN AMERICAN): 12 ML/MIN/1.73 M^2
EST. GFR  (NON AFRICAN AMERICAN): 10 ML/MIN/1.73 M^2
GLUCOSE SERPL-MCNC: 347 MG/DL
HCT VFR BLD AUTO: 31.6 %
HGB BLD-MCNC: 10.5 G/DL
INR PPP: 1.1
LYMPHOCYTES # BLD AUTO: 0.7 K/UL
LYMPHOCYTES NFR BLD: 6.8 %
MAGNESIUM SERPL-MCNC: 2.1 MG/DL
MCH RBC QN AUTO: 31.1 PG
MCHC RBC AUTO-ENTMCNC: 33.1 G/DL
MCV RBC AUTO: 94 FL
MONOCYTES # BLD AUTO: 0.3 K/UL
MONOCYTES NFR BLD: 2.8 %
NEUTROPHILS # BLD AUTO: 8.7 K/UL
NEUTROPHILS NFR BLD: 90.4 %
PHOSPHATE SERPL-MCNC: 6.5 MG/DL
PLATELET # BLD AUTO: 123 K/UL
PMV BLD AUTO: 8.4 FL
POCT GLUCOSE: 176 MG/DL (ref 70–110)
POCT GLUCOSE: 361 MG/DL (ref 70–110)
POCT GLUCOSE: 361 MG/DL (ref 70–110)
POCT GLUCOSE: 422 MG/DL (ref 70–110)
POTASSIUM SERPL-SCNC: 4.2 MMOL/L
PROT SERPL-MCNC: 5.4 G/DL
PROTHROMBIN TIME: 11.8 SEC
RBC # BLD AUTO: 3.37 M/UL
SODIUM SERPL-SCNC: 131 MMOL/L
TROPONIN I SERPL DL<=0.01 NG/ML-MCNC: 0.04 NG/ML
WBC # BLD AUTO: 9.6 K/UL

## 2019-03-13 PROCEDURE — 63600175 PHARM REV CODE 636 W HCPCS: Performed by: INTERNAL MEDICINE

## 2019-03-13 PROCEDURE — 99231 SBSQ HOSP IP/OBS SF/LOW 25: CPT | Mod: ,,, | Performed by: INTERNAL MEDICINE

## 2019-03-13 PROCEDURE — 25000242 PHARM REV CODE 250 ALT 637 W/ HCPCS: Performed by: NURSE PRACTITIONER

## 2019-03-13 PROCEDURE — 25000003 PHARM REV CODE 250: Performed by: INTERNAL MEDICINE

## 2019-03-13 PROCEDURE — 25000242 PHARM REV CODE 250 ALT 637 W/ HCPCS: Performed by: INTERNAL MEDICINE

## 2019-03-13 PROCEDURE — 94761 N-INVAS EAR/PLS OXIMETRY MLT: CPT

## 2019-03-13 PROCEDURE — 80100016 HC MAINTENANCE HEMODIALYSIS

## 2019-03-13 PROCEDURE — 25000003 PHARM REV CODE 250: Performed by: NURSE PRACTITIONER

## 2019-03-13 PROCEDURE — 97803 MED NUTRITION INDIV SUBSEQ: CPT

## 2019-03-13 PROCEDURE — S5571 INSULIN DISPOS PEN 3 ML: HCPCS | Performed by: NURSE PRACTITIONER

## 2019-03-13 PROCEDURE — 12000002 HC ACUTE/MED SURGE SEMI-PRIVATE ROOM

## 2019-03-13 PROCEDURE — 63600175 PHARM REV CODE 636 W HCPCS: Performed by: NURSE PRACTITIONER

## 2019-03-13 PROCEDURE — 84100 ASSAY OF PHOSPHORUS: CPT

## 2019-03-13 PROCEDURE — 36415 COLL VENOUS BLD VENIPUNCTURE: CPT

## 2019-03-13 PROCEDURE — 85730 THROMBOPLASTIN TIME PARTIAL: CPT | Mod: 91

## 2019-03-13 PROCEDURE — 80053 COMPREHEN METABOLIC PANEL: CPT

## 2019-03-13 PROCEDURE — 83735 ASSAY OF MAGNESIUM: CPT

## 2019-03-13 PROCEDURE — 94640 AIRWAY INHALATION TREATMENT: CPT

## 2019-03-13 PROCEDURE — 85610 PROTHROMBIN TIME: CPT

## 2019-03-13 PROCEDURE — 84484 ASSAY OF TROPONIN QUANT: CPT

## 2019-03-13 PROCEDURE — 85025 COMPLETE CBC W/AUTO DIFF WBC: CPT

## 2019-03-13 PROCEDURE — 99231 PR SUBSEQUENT HOSPITAL CARE,LEVL I: ICD-10-PCS | Mod: ,,, | Performed by: INTERNAL MEDICINE

## 2019-03-13 RX ORDER — ALBUTEROL SULFATE 2.5 MG/.5ML
2.5 SOLUTION RESPIRATORY (INHALATION) EVERY 6 HOURS
Status: DISCONTINUED | OUTPATIENT
Start: 2019-03-13 | End: 2019-03-13 | Stop reason: SDUPTHER

## 2019-03-13 RX ORDER — INSULIN ASPART 100 [IU]/ML
0-5 INJECTION, SOLUTION INTRAVENOUS; SUBCUTANEOUS
Status: DISCONTINUED | OUTPATIENT
Start: 2019-03-13 | End: 2019-03-15 | Stop reason: HOSPADM

## 2019-03-13 RX ORDER — IPRATROPIUM BROMIDE AND ALBUTEROL SULFATE 2.5; .5 MG/3ML; MG/3ML
3 SOLUTION RESPIRATORY (INHALATION) EVERY 6 HOURS PRN
Status: DISCONTINUED | OUTPATIENT
Start: 2019-03-13 | End: 2019-03-13

## 2019-03-13 RX ORDER — IPRATROPIUM BROMIDE AND ALBUTEROL SULFATE 2.5; .5 MG/3ML; MG/3ML
3 SOLUTION RESPIRATORY (INHALATION) EVERY 6 HOURS
Status: DISCONTINUED | OUTPATIENT
Start: 2019-03-13 | End: 2019-03-15 | Stop reason: HOSPADM

## 2019-03-13 RX ORDER — WARFARIN SODIUM 5 MG/1
5 TABLET ORAL ONCE
Status: COMPLETED | OUTPATIENT
Start: 2019-03-13 | End: 2019-03-13

## 2019-03-13 RX ADMIN — AZITHROMYCIN MONOHYDRATE 500 MG: 500 INJECTION, POWDER, LYOPHILIZED, FOR SOLUTION INTRAVENOUS at 07:03

## 2019-03-13 RX ADMIN — ATORVASTATIN CALCIUM 80 MG: 40 TABLET, FILM COATED ORAL at 11:03

## 2019-03-13 RX ADMIN — IPRATROPIUM BROMIDE AND ALBUTEROL SULFATE 3 ML: .5; 3 SOLUTION RESPIRATORY (INHALATION) at 07:03

## 2019-03-13 RX ADMIN — INSULIN DETEMIR 10 UNITS: 100 INJECTION, SOLUTION SUBCUTANEOUS at 10:03

## 2019-03-13 RX ADMIN — WARFARIN SODIUM 5 MG: 5 TABLET ORAL at 05:03

## 2019-03-13 RX ADMIN — GABAPENTIN 300 MG: 300 CAPSULE ORAL at 10:03

## 2019-03-13 RX ADMIN — ISOSORBIDE MONONITRATE 10 MG: 10 TABLET ORAL at 10:03

## 2019-03-13 RX ADMIN — TAMSULOSIN HYDROCHLORIDE 0.4 MG: 0.4 CAPSULE ORAL at 11:03

## 2019-03-13 RX ADMIN — METHYLPREDNISOLONE SODIUM SUCCINATE 62.5 MG: 125 INJECTION, POWDER, FOR SOLUTION INTRAMUSCULAR; INTRAVENOUS at 10:03

## 2019-03-13 RX ADMIN — ALLOPURINOL 100 MG: 100 TABLET ORAL at 11:03

## 2019-03-13 RX ADMIN — POLYETHYLENE GLYCOL 3350 17 G: 17 POWDER, FOR SOLUTION ORAL at 11:03

## 2019-03-13 RX ADMIN — CARVEDILOL 6.25 MG: 6.25 TABLET, FILM COATED ORAL at 11:03

## 2019-03-13 RX ADMIN — METHYLPREDNISOLONE SODIUM SUCCINATE 62.5 MG: 125 INJECTION, POWDER, FOR SOLUTION INTRAMUSCULAR; INTRAVENOUS at 01:03

## 2019-03-13 RX ADMIN — CARVEDILOL 6.25 MG: 6.25 TABLET, FILM COATED ORAL at 10:03

## 2019-03-13 RX ADMIN — PANTOPRAZOLE SODIUM 40 MG: 40 TABLET, DELAYED RELEASE ORAL at 11:03

## 2019-03-13 RX ADMIN — TORSEMIDE 20 MG: 20 TABLET ORAL at 11:03

## 2019-03-13 RX ADMIN — MIRTAZAPINE 7.5 MG: 7.5 TABLET ORAL at 10:03

## 2019-03-13 RX ADMIN — TORSEMIDE 20 MG: 20 TABLET ORAL at 05:03

## 2019-03-13 RX ADMIN — FLUTICASONE FUROATE AND VILANTEROL TRIFENATATE 1 PUFF: 100; 25 POWDER RESPIRATORY (INHALATION) at 10:03

## 2019-03-13 RX ADMIN — LOSARTAN POTASSIUM 100 MG: 25 TABLET, FILM COATED ORAL at 11:03

## 2019-03-13 RX ADMIN — FINASTERIDE 5 MG: 5 TABLET, FILM COATED ORAL at 11:03

## 2019-03-13 RX ADMIN — ALBUTEROL SULFATE 2.5 MG: 2.5 SOLUTION RESPIRATORY (INHALATION) at 11:03

## 2019-03-13 RX ADMIN — HEPARIN SODIUM 12 UNITS/KG/HR: 10000 INJECTION, SOLUTION INTRAVENOUS at 02:03

## 2019-03-13 RX ADMIN — CEFTRIAXONE 1 G: 1 INJECTION, SOLUTION INTRAVENOUS at 05:03

## 2019-03-13 RX ADMIN — INSULIN ASPART 5 UNITS: 100 INJECTION, SOLUTION INTRAVENOUS; SUBCUTANEOUS at 05:03

## 2019-03-13 RX ADMIN — INSULIN ASPART 3 UNITS: 100 INJECTION, SOLUTION INTRAVENOUS; SUBCUTANEOUS at 10:03

## 2019-03-13 RX ADMIN — METHYLPREDNISOLONE SODIUM SUCCINATE 62.5 MG: 125 INJECTION, POWDER, FOR SOLUTION INTRAMUSCULAR; INTRAVENOUS at 05:03

## 2019-03-13 RX ADMIN — ASPIRIN 81 MG: 81 TABLET, COATED ORAL at 11:03

## 2019-03-13 NOTE — TELEPHONE ENCOUNTER
----- Message from Mary King sent at 3/13/2019  8:59 AM CDT -----  Type: Needs Medical Advice    Who Called: Patient    Best Call Back Number: [phone] (home)     Additional Information:Patient stating currently in the hospital, would like to discuss. Also has upcoming appt with Dr. Lakhani, please advise

## 2019-03-13 NOTE — CONSULTS
Food & Nutrition                                                           Education    Diet Education: Coumadin and Vitamin K interaction  Time Spent: 10 Minutes  Learners:      Nutrition Education provided with handouts: Yes      Comments: Patient eating well on low sodium diet. On HD and compliant with renal diet at home, daughter is a dietitian and pt has been previously education on renal diet restrictions. Reviewed renal options available on our menu and educated pt on vitamin K interaction with coumadin medication per RN consult. Discussed the importance of consistent vitamin K intake. Pt states he really enjoys green leafy vegetables but understands he will have to keep the amount of vitamin K he consumes consistent, teach back method used.     Of note renal diet ordered then canceled, renal diet sent in second sign.      All questions and concerns answered. Dietitian's contact information provided.       Follow-Up: Yes    Please Re-consult as needed        Thanks!

## 2019-03-13 NOTE — PROGRESS NOTES
[facility]  Cardiology  Progress Note    Patient Name: Micheal Hunt  MRN: [id number]  Admission Date: 3/11/2019  Hospital Length of Stay: 2 days  Code Status: Full Code   Attending Physician: Charissa Olivas MD   Primary Care Physician: Pk Lakhani MD  Expected Discharge Date:   Principal Problem:Asthma exacerbation    Subjective:     Hospital Course:  Micheal Hunt is a 78 y.o. male admitted from Dr. Guillen's office with complaint of SOB.  HD since 12/2018.   Found AF with controlled HR. Denies palpitation, dizziness.     PMH: CAD s/p CABG 2007,  bronchial asthma, hypertension, end-stage renal disease (on hemodialysis Monday/Wednesday/Friday), benign prostatic hypertrophy, gastroesophageal reflux disease, history of gout hyperlipidemia, obstructive sleep apnea (not on any CPAP).     Interval History: Sitting up on edge of bed eating his breakfast. He has just gotten back from dialysis. Noted to have course rhonchi to bases and inspiratory wheezes. Reports feeling a little better. No respiratory distress. Remains in AF with controlled rate. No new concerns voiced.    ROS  Objective:     Vital Signs (Most Recent):  Temp: 97 °F (36.1 °C) (03/13/19 1115)  Pulse: 76 (03/13/19 1156)  Resp: 18 (03/13/19 1156)  BP: (!) 150/99 (03/13/19 1115)  SpO2: 98 % (03/13/19 1156) Vital Signs (24h Range):  Temp:  [96.3 °F (35.7 °C)-97.7 °F (36.5 °C)] 97 °F (36.1 °C)  Pulse:  [60-88] 76  Resp:  [14-18] 18  SpO2:  [95 %-99 %] 98 %  BP: (143-180)/() 150/99     Weight: 108 kg (238 lb)  Body mass index is 31.4 kg/m².    SpO2: 98 %  O2 Device (Oxygen Therapy): room air      Intake/Output Summary (Last 24 hours) at 3/13/2019 1201  Last data filed at 3/13/2019 1115  Gross per 24 hour   Intake 1278.47 ml   Output 2200 ml   Net -921.53 ml       Lines/Drains/Airways     Drain                 Hemodialysis AV Fistula Left upper arm -- days          Peripheral Intravenous Line                 Peripheral IV -  Single Lumen 03/12/19 1854 Anterior;Right Forearm less than 1 day                Physical Exam   Constitutional: He is oriented to person, place, and time. He appears well-developed and well-nourished.   Neck: Normal range of motion. Neck supple. No thyromegaly present.   Cardiovascular:   Irregular rate but controlled in the 60's.   Pulmonary/Chest: No respiratory distress. He has wheezes.   Rhonchi to the bases.   Musculoskeletal: Normal range of motion.   Neurological: He is alert and oriented to person, place, and time.   Skin: Skin is warm and dry.   Psychiatric: He has a normal mood and affect. His behavior is normal. Thought content normal.       Significant Labs:   CMP   Recent Labs   Lab 03/12/19  0624 03/13/19  0428   * 131*   K 4.1 4.2   CL 97 95   CO2 24 21*   * 347*   BUN 70* 89*   CREATININE 4.4* 5.0*   CALCIUM 7.8* 7.4*   PROT 5.8* 5.4*   ALBUMIN 3.4* 3.1*   BILITOT 0.5 0.4   ALKPHOS 93 87   AST 26 22   * 107*   ANIONGAP 13 15   ESTGFRAFRICA 14* 12*   EGFRNONAA 12* 10*   , CBC   Recent Labs   Lab 03/12/19 2051 03/13/19 0428   WBC 9.90 9.60   HGB 10.8* 10.5*   HCT 32.5* 31.6*   * 123*    and Troponin   Recent Labs   Lab 03/12/19 1957   TROPONINI 0.056*       Significant Imaging:    Assessment and Plan:   New diagnosed AF, CHADS2 VASc score 4  HFpEF  CAD s/p CABG  Mild AS  PHTN, 50mmHg via Echo  ESRD-HD  HTN   HLD  GRICELDA    Continue Heparin drip for bridging,  coumadin 5mg/d + 5mg extra today, adjust dose keep INR 2-3; fall and bleeding precaution. INR is still at 1.1 today.  Continue ASA and statin.  Tosremide to 20mg po bid; low salt and fluid restriction; routine HD.    VTE Risk Mitigation (From admission, onward)        Ordered     heparin 25,000 units in dextrose 5% 250 mL (100 units/mL) infusion LOW INTENSITY nomogram - [facility]  Continuous      03/12/19 2040     heparin 25,000 units in dextrose 5% (100 units/ml) IV bolus from bag - ADDITIONAL PRN BOLUS - 60 units/kg  As  needed (PRN)      03/12/19 2040     heparin 25,000 units in dextrose 5% (100 units/ml) IV bolus from bag - ADDITIONAL PRN BOLUS - 30 units/kg  As needed (PRN)      03/12/19 2040     warfarin (COUMADIN) tablet 5 mg  Daily      03/12/19 2046     IP VTE HIGH RISK PATIENT  Once      03/11/19 1059        MD Janine Terrell, GAVIOTAP  Cardiology  [facility]

## 2019-03-13 NOTE — PROGRESS NOTES
[facility] Hospital Medicine  Progress Note    Patient Name: Micheal Hunt  MRN: [id number]  Patient Class: IP- Inpatient   Admission Date: 3/11/2019  Length of Stay: 2 days  Attending Physician: Charissa Olivas MD  Primary Care Provider: Pk Lakhani MD        Subjective:     Principal Problem:Asthma exacerbation    HPI:  No notes on file    Hospital Course:  No notes on file    Interval History:  No new issues overnight.  Evaluated on dialysis.  He is feeling better this morning remains in atrial fibrillation with controlled ventricular response.  Currently on a heparin drip per Cardiology for warfarin bridge.  Denies chest pain and shortness breath      Review of Systems   Constitutional: Positive for activity change, appetite change and fatigue. Negative for chills.   HENT: Negative for congestion, hearing loss and sore throat.    Respiratory: Positive for cough. Negative for shortness of breath and wheezing.    Cardiovascular: Positive for leg swelling. Negative for chest pain and palpitations.   Gastrointestinal: Negative for abdominal pain and nausea.   Musculoskeletal: Positive for arthralgias. Negative for back pain and myalgias.   Neurological: Negative for dizziness, syncope and light-headedness.   Psychiatric/Behavioral: Negative for agitation and confusion. The patient is not nervous/anxious.      Objective:     Vital Signs (Most Recent):  Temp: 97 °F (36.1 °C) (03/13/19 1115)  Pulse: 76 (03/13/19 1156)  Resp: 18 (03/13/19 1156)  BP: (!) 150/99 (03/13/19 1115)  SpO2: 98 % (03/13/19 1156) Vital Signs (24h Range):  Temp:  [96.3 °F (35.7 °C)-97.7 °F (36.5 °C)] 97 °F (36.1 °C)  Pulse:  [60-88] 76  Resp:  [14-18] 18  SpO2:  [95 %-99 %] 98 %  BP: (143-180)/() 150/99     Weight: 108 kg (238 lb)  Body mass index is 31.4 kg/m².    Intake/Output Summary (Last 24 hours) at 3/13/2019 1443  Last data filed at 3/13/2019 1115  Gross per 24 hour   Intake 1278.47 ml   Output 2200 ml   Net  -921.53 ml      Physical Exam   Constitutional: He is oriented to person, place, and time. He appears well-developed and well-nourished.   HENT:   Head: Normocephalic and atraumatic.   Nose: Nose normal.   Mouth/Throat: Oropharynx is clear and moist.   Eyes: Conjunctivae and EOM are normal. Pupils are equal, round, and reactive to light.   Neck: Normal range of motion. Neck supple.   Cardiovascular: Normal rate, regular rhythm and intact distal pulses.   Murmur heard.  Irregular irregular.  Trace pretibial edema   Pulmonary/Chest: Effort normal. He has no wheezes.   Course decreased at the bases   Abdominal: Soft. Bowel sounds are normal. There is no tenderness.   Musculoskeletal: Normal range of motion.   Neurological: He is alert and oriented to person, place, and time. Coordination normal.   Skin: Skin is warm and dry.   Av fistula positive thrill and bruit to left upper extremity   Psychiatric: He has a normal mood and affect. His behavior is normal.   Nursing note and vitals reviewed.      Significant Labs:   BMP:   Recent Labs   Lab 03/13/19  0428   *   *   K 4.2   CL 95   CO2 21*   BUN 89*   CREATININE 5.0*   CALCIUM 7.4*   MG 2.1     CBC:   Recent Labs   Lab 03/12/19 2051 03/13/19  0428   WBC 9.90 9.60   HGB 10.8* 10.5*   HCT 32.5* 31.6*   * 123*       Significant Imaging: I have reviewed and interpreted all pertinent imaging results/findings within the past 24 hours.    Assessment/Plan:      * Asthma exacerbation    Pulmonary consulted.  Initiated on scheduled breathing treatments, Flonase and steroids.       NSVT (nonsustained ventricular tachycardia)    Cardiac monitoring.  Consult Cardiology.       ESRD (end stage renal disease)    Consult Nephrology for end-stage renal disease management.  Resume hemodialysis on his routine schedule.       Atrial fibrillation    Current and atrial fibrillation on telemetry.  No recent EKG demonstrates telemetry will consult Cardiology.   New onset  atrial fibrillation.  Cardiology initiated heparin drip for warfarin bridge.  Will trend INR.  Consult dietary.       Immune deficiency disorder    Workup for immunodeficiency disorder outpatient per pulmonology       Pneumonia of left lower lobe due to infectious organism    Initiated on IV antibiotics.  Procalcitonin pending  Will deescalate antibiotics if procalcitonin negative.     Anemia due to chronic kidney disease, on chronic dialysis    Hemoglobin-hematocrit stable.  Trend CBC       CAD (coronary artery disease), 2V CABG 2007    Chronic resume home statin and aspirin         VTE Risk Mitigation (From admission, onward)        Ordered     warfarin (COUMADIN) tablet 5 mg  Once      03/13/19 1402     heparin 25,000 units in dextrose 5% 250 mL (100 units/mL) infusion LOW INTENSITY nomogram - [facility]  Continuous      03/12/19 2040     heparin 25,000 units in dextrose 5% (100 units/ml) IV bolus from bag - ADDITIONAL PRN BOLUS - 60 units/kg  As needed (PRN)      03/12/19 2040     heparin 25,000 units in dextrose 5% (100 units/ml) IV bolus from bag - ADDITIONAL PRN BOLUS - 30 units/kg  As needed (PRN)      03/12/19 2040     warfarin (COUMADIN) tablet 5 mg  Daily      03/12/19 2046     IP VTE HIGH RISK PATIENT  Once      03/11/19 1059              Bell Wilkinson NP  Department of Hospital Medicine   [facility]

## 2019-03-13 NOTE — PROGRESS NOTES
Progress Note  PULMONARY    Admit Date: 3/11/2019   03/13/2019      SUBJECTIVE:     March 13- better but still wheezes, no distress,      PFSH and Allergies reviewed.    OBJECTIVE:     Vitals (Most recent):  Vitals:    03/13/19 1156   BP:    Pulse: 76   Resp: 18   Temp:        Vitals (24 hour range):  Temp:  [96.3 °F (35.7 °C)-97.7 °F (36.5 °C)]   Pulse:  [60-88]   Resp:  [14-18]   BP: (143-180)/()   SpO2:  [95 %-99 %]       Intake/Output Summary (Last 24 hours) at 3/13/2019 1303  Last data filed at 3/13/2019 1115  Gross per 24 hour   Intake 1278.47 ml   Output 2200 ml   Net -921.53 ml          Physical Exam:  The patient's neuro status (alertness,orientation,cognitive function,motor skills,), pharyngeal exam (oral lesions, hygiene, abn dentition,), Neck (jvd,mass,thyroid,nodes in neck and above/below clavicle),RESPIRATORY(symmetry,effort,fremitus,percussion,auscultation),  Cor(rhythm,heart tones including gallops,perfusion,edema)ABD(distention,hepatic&splenomegaly,tenderness,masses), Skin(rash,cyanosis),Psyc(affect,judgement,).  Exam negative except for these pertinent findings:    Alert, no distress, fainter wheezes, symmetric,no edema     Radiographs reviewed: view by direct vision  No new  Results for orders placed during the hospital encounter of 11/05/18   X-Ray Chest 1 View    Narrative EXAMINATION:  XR CHEST 1 VIEW    CLINICAL HISTORY:  cough;    TECHNIQUE:  Single frontal view of the chest was performed.    COMPARISON:  11/5/2018    FINDINGS:  The cardiomediastinal silhouette is stable..  There are median sternotomy sutures.  Thoracic aorta is tortuous    The left lower lobe infiltrate with best seen on the lateral view on the prior exam and follow-up to include lateral view is suggested.  As visualized on the frontal view it appears decreased.    Right lung remains clear no effusion.      Impression Decrease of the left basilar infiltrate compared to the prior exam.  Suggest follow-up study to  include lateral view as it is best seen on the lateral view.      Electronically signed by: Mayuri Castro MD  Date:    11/08/2018  Time:    14:19   ]    Labs     Recent Labs   Lab 03/13/19 0428   WBC 9.60   HGB 10.5*   HCT 31.6*   *     Recent Labs   Lab 03/12/19 1957 03/13/19 0428   NA  --   --  131*   K  --   --  4.2   CL  --   --  95   CO2  --   --  21*   BUN  --   --  89*   CREATININE  --   --  5.0*   GLU  --   --  347*   CALCIUM  --   --  7.4*   MG  --   --  2.1   PHOS  --   --  6.5*   AST  --   --  22   ALT  --   --  107*   ALKPHOS  --   --  87   BILITOT  --   --  0.4   PROT  --   --  5.4*   ALBUMIN  --   --  3.1*   INR  --    < > 1.1   TROPONINI 0.056*  --   --    BNP 1,176*  --   --     < > = values in this interval not displayed.   No results for input(s): PH, PCO2, PO2, HCO3 in the last 24 hours.  Microbiology Results (last 7 days)     Procedure Component Value Units Date/Time    Culture, Respiratory [[id number]]     Order Status:  No result Specimen:  Respiratory from Sputum, Expectorated           Impression:  Active Hospital Problems    Diagnosis  POA    *Asthma exacerbation [J45.901]  Yes    Atrial fibrillation [I48.91]  Yes    ESRD (end stage renal disease) [N18.6]  Yes    NSVT (nonsustained ventricular tachycardia) [I47.2]  No    Pneumonia of left lower lobe due to infectious organism [J18.1]  Yes    Immune deficiency disorder [D84.9]  Yes    Aortic calcification [I70.0]  Yes     Defer to primary control of cholesterol and bp.          Anemia due to chronic kidney disease, on chronic dialysis [N18.6, D63.1, Z99.2]  Not Applicable    CAD (coronary artery disease), 2V CABG 2007 [I25.10]  Yes      Resolved Hospital Problems   No resolved problems to display.               Plan:   March 12, doing well, asthma and immune deficiency.  outpt soon reasonable. Pneumonia better.    March 13,better,outpt 14 or 15th, coumadin for a fib.  procal 0.16                                  .

## 2019-03-13 NOTE — SUBJECTIVE & OBJECTIVE
Interval History:  No new issues overnight.  Evaluated on dialysis.  He is feeling better this morning remains in atrial fibrillation with controlled ventricular response.  Currently on a heparin drip per Cardiology for warfarin bridge.  Denies chest pain and shortness breath      Review of Systems   Constitutional: Positive for activity change, appetite change and fatigue. Negative for chills.   HENT: Negative for congestion, hearing loss and sore throat.    Respiratory: Positive for cough. Negative for shortness of breath and wheezing.    Cardiovascular: Positive for leg swelling. Negative for chest pain and palpitations.   Gastrointestinal: Negative for abdominal pain and nausea.   Musculoskeletal: Positive for arthralgias. Negative for back pain and myalgias.   Neurological: Negative for dizziness, syncope and light-headedness.   Psychiatric/Behavioral: Negative for agitation and confusion. The patient is not nervous/anxious.      Objective:     Vital Signs (Most Recent):  Temp: 97 °F (36.1 °C) (03/13/19 1115)  Pulse: 76 (03/13/19 1156)  Resp: 18 (03/13/19 1156)  BP: (!) 150/99 (03/13/19 1115)  SpO2: 98 % (03/13/19 1156) Vital Signs (24h Range):  Temp:  [96.3 °F (35.7 °C)-97.7 °F (36.5 °C)] 97 °F (36.1 °C)  Pulse:  [60-88] 76  Resp:  [14-18] 18  SpO2:  [95 %-99 %] 98 %  BP: (143-180)/() 150/99     Weight: 108 kg (238 lb)  Body mass index is 31.4 kg/m².    Intake/Output Summary (Last 24 hours) at 3/13/2019 1443  Last data filed at 3/13/2019 1115  Gross per 24 hour   Intake 1278.47 ml   Output 2200 ml   Net -921.53 ml      Physical Exam   Constitutional: He is oriented to person, place, and time. He appears well-developed and well-nourished.   HENT:   Head: Normocephalic and atraumatic.   Nose: Nose normal.   Mouth/Throat: Oropharynx is clear and moist.   Eyes: Conjunctivae and EOM are normal. Pupils are equal, round, and reactive to light.   Neck: Normal range of motion. Neck supple.   Cardiovascular:  Normal rate, regular rhythm and intact distal pulses.   Murmur heard.  Irregular irregular.  Trace pretibial edema   Pulmonary/Chest: Effort normal. He has no wheezes.   Course decreased at the bases   Abdominal: Soft. Bowel sounds are normal. There is no tenderness.   Musculoskeletal: Normal range of motion.   Neurological: He is alert and oriented to person, place, and time. Coordination normal.   Skin: Skin is warm and dry.   Av fistula positive thrill and bruit to left upper extremity   Psychiatric: He has a normal mood and affect. His behavior is normal.   Nursing note and vitals reviewed.      Significant Labs:   BMP:   Recent Labs   Lab 03/13/19 0428   *   *   K 4.2   CL 95   CO2 21*   BUN 89*   CREATININE 5.0*   CALCIUM 7.4*   MG 2.1     CBC:   Recent Labs   Lab 03/12/19 2051 03/13/19  0428   WBC 9.90 9.60   HGB 10.8* 10.5*   HCT 32.5* 31.6*   * 123*       Significant Imaging: I have reviewed and interpreted all pertinent imaging results/findings within the past 24 hours.

## 2019-03-13 NOTE — PLAN OF CARE
03/12/19 2050   Patient Assessment/Suction   Level of Consciousness (AVPU) alert   Respiratory Effort Unlabored   PRE-TX-O2   O2 Device (Oxygen Therapy) room air   SpO2 96 %   Pulse Oximetry Type Intermittent   $ Pulse Oximetry - Multiple Charge Pulse Oximetry - Multiple   Aerosol Therapy   $ Aerosol Therapy Charges Refused   Respiratory Treatment Status (SVN) refused

## 2019-03-13 NOTE — CONSULTS
Subjective:       Micheal Hunt is a 78 y.o. male admitted from Dr. Guillen's office with complaint of SOB. Received HD since 12/2018. Dry cough. Denies chest pain, orthopnea or PND, leg swelling, fever or chill, abd pain, n/v, or active bleeding. Still makes decent urine.  Found AF with controlled HR. Denies palpitation, dizziness.    PMH: bronchial asthma, hypertension, end-stage renal disease (on hemodialysis Monday/Wednesday/Friday), CAD s/p CABG 2007, benign prostatic hypertrophy, gastroesophageal reflux disease, history of gout hyperlipidemia, obstructive sleep apnea (not on any CPAP).     Past Medical History:   Diagnosis Date    NICK (acute kidney injury) 7/29/2016    Allergy     Anemia, mild 12/15/2014    Arthritis     Gout    Benign essential HTN 3/27/2012    BMI 29.0-29.9,adult 5/10/2018    BPH (benign prostatic hyperplasia)     BPH (benign prostatic hyperplasia)     CAD (coronary artery disease) 2006    Chronic kidney disease     due to ibuprofen    Colon polyp     CRF (chronic renal failure), stage 5     Diverticulosis     Gastritis     GERD (gastroesophageal reflux disease)     Gout     History of colon polyps 5/3/2018    HTN (hypertension) 3/27/2012    Hyperlipidemia     Hyperlipidemia     LLL pneumonia 6/14/2018    LVH (left ventricular hypertrophy)     Mesenteric ischemia     Murmur, cardiac 3/27/2012    GRICELDA (obstructive sleep apnea)     DOES NOT USE A MACHINE    Sinus problem     Syncope and collapse        Review of Systems  12 systems reviewed, negative except mentioned in HPI.     Objective:       Physical Exam  Gen: Comfort, lying on bed, in no distress  Skin: warm and dry  Lymph: no lymphadenopathy detected  HEENT: NC/AT  Neck: supple, no JVD/bruit  Chest: no deformity, equal movement b/l  Lung: CTA b/l  Heart: Irregular, S1/S2 +, 2/6 SM on upper sternal border, no G/R  Abd: BS+, S, NT/ND  Ext: no deformity, no pretibial edema  Pulse: b/l radial 2+  Neuro:  AAOx3    Cardiographics  ECG 3/11/19: AF,  bpm, LVH, TWI.  Echocardiogram 3/12/19:   · Mild left ventricular enlargement. Normal left ventricular systolic function. The estimated ejection fraction is 55%  · Atrial fibrillation observed with restrictive filling pattern present.  · Severe right ventricular enlargement. Normal right ventricular systolic function.  · Severe left atrial enlargement.  · Severe right atrial enlargement.  · Mild aortic valve stenosis (although calculated PEARL within normal range; mean gradient 17mmHg, peak velocity 2.9m/s). Mild aortic regurgitation.  · Mild mitral regurgitation.  · Mild tricuspid regurgitation.  · Pulmonary hypertension present. The estimated PA systolic pressure is 50 mm Hg    Imaging  Chest x-ray 3/11/19: Mild pulmonary infiltrates favoring pneumonia. Cardiomegaly, atherosclerosis and prior sternotomy.    Lab Review   Lab Results   Component Value Date    WBC 6.30 01/29/2019    HGB 11.5 (L) 01/29/2019    HCT 34.9 (L) 01/29/2019    MCV 93 01/29/2019     01/29/2019     BMP  Lab Results   Component Value Date     (L) 03/12/2019    K 4.1 03/12/2019    CL 97 03/12/2019    CO2 24 03/12/2019    BUN 70 (H) 03/12/2019    CREATININE 4.4 (H) 03/12/2019    CALCIUM 7.8 (L) 03/12/2019    ANIONGAP 13 03/12/2019    ESTGFRAFRICA 14 (A) 03/12/2019    EGFRNONAA 12 (A) 03/12/2019     No results for input(s): TROPONINI in the last 168 hours.  BNP  No results for input(s): BNP, BNPTRIAGEBLO in the last 168 hours.      Assessment:   New diagnosed AF, CHADS2 VAScscore 4  HFpEF  CAD s/p CABG  Mild AS  PHTN, 50mmHg via Echo  ESRD-HD  HTN   HLD  GRICELDA     Plan:   Start Heparin drip for bridging, start coumadin 5mg/d, adjust dose keep INR 2-3; fall and bleeding precaution.  Continue ASA and statin.  Increase Tosremide to 20mg po bid; low salt and fluid restriction; routine HD.

## 2019-03-13 NOTE — CONSULTS
Nephrology Consult Progress Note        Patient Name: Micheal Hunt  MRN: [id number]    Patient Class: IP- Inpatient   Admission Date: 3/11/2019  Length of Stay: 2 days  Date of Service: 3/13/2019    Attending Physician: Charissa Olivas MD  Primary Care Provider: Pk Lakhani MD    Reason for Consult: esrd    SUBJECTIVE:     HPI: 78M recently started on HD MWF in [place], [place] admitted to Hospitalist Service from Dr. Guillen's office with complaint of persisting asthma exacerbation, failing outpatient steroids and abx therapy, now diagnosed with PNA. Renal consulted for co-management.    3/11 Seen and examined on machine today, tolerating well, no complains other than dry cough.  3/12 VSS, feels better after HD yesterday, still coughing. Next HD in AM.  3/13 Seen and examined on machine today, tolerating well, no complains other than dry cough.    Past Medical History:   Diagnosis Date    NICK (acute kidney injury) 7/29/2016    Allergy     Anemia, mild 12/15/2014    Arthritis     Gout    Benign essential HTN 3/27/2012    BMI 29.0-29.9,adult 5/10/2018    BPH (benign prostatic hyperplasia)     BPH (benign prostatic hyperplasia)     CAD (coronary artery disease) 2006    Chronic kidney disease     due to ibuprofen    Colon polyp     CRF (chronic renal failure), stage 5     Diverticulosis     Gastritis     GERD (gastroesophageal reflux disease)     Gout     History of colon polyps 5/3/2018    HTN (hypertension) 3/27/2012    Hyperlipidemia     Hyperlipidemia     LLL pneumonia 6/14/2018    LVH (left ventricular hypertrophy)     Mesenteric ischemia     Murmur, cardiac 3/27/2012    GRICELDA (obstructive sleep apnea)     DOES NOT USE A MACHINE    Sinus problem     Syncope and collapse      Past Surgical History:   Procedure Laterality Date    APPENDECTOMY      BLOCK-NERVE Left 5/31/2016    [facility]    CARDIAC CATHETERIZATION      COLONOSCOPY  2011    COLONOSCOPY N/A  5/3/2018    [facility]    COLONOSCOPY N/A 9/10/2015    [facility] at [facility]    CORONARY ARTERY BYPASS GRAFT  4/2007    x 1    CYSTOSCOPY N/A 2017    [facility]ring [facility] at [facility]    CYSTOSCOPY N/A 11/10/2015    [facility] at [facility]    ESOPHAGOGASTRODUODENOSCOPY (EGD) N/A 2016    Performed by Tra Brooks [facility] at [facility] END[facility] (2ND FLR)    ESOPHAGOGASTRODUODENOSCOPY (EGD) N/A 10/15/2014    Performed by [facility]i [facility] at [facility]    INJECTION-STEROID-EPIDURAL-TRANSFORAMINAL Left 2016    [facility]    JOINT REPLACEMENT      left knee total replacement  X 3    mid leftt finger      from a cactuss    MOLE REMOVAL      RADIOFREQUENCY THERMOCOAGULATION (RFTC)-NERVE-MEDIAN BRANCH-LUMBAR Left 2016    [facility] at [facility]    rotative cuff      no rotative cuffs on bilat shoulders has pins     SHOULDER SURGERY      shoulder surgery bilat  RIGHT X 4; LEFT X 3    TRANSRECTAL ULTRASOUND GUIDED PROSTATE BIOPSY Bilateral 11/10/2015    [facility] at [facility] OR    ULTRASOUND-ENDOSCOPIC-UPPER N/A 2017    Performed [facility] at [facility] END[facility] (2ND FLR)    ULTRASOUND-ENDOSCOPIC-UPPER N/A 2016    Performed by Tra [facility] at [facility] END[facility] (2ND FLR)    ULTRASOUND-ENDOSCOPIC-UPPER N/A 2015    Performed by Jason Saleem [facility] at [facility] END[facility] (2ND FLR)     Family History   Problem Relation Age of Onset    Heart disease Mother     Sudden death Father     Cancer Father         advanced lung ca- found after 2 story fall    Stroke Sister     Pneumonia Sister          from PNA    Heart disease Sister     Hypertension Brother     Kidney cancer Neg Hx     Prostate cancer Neg Hx     Urolithiasis Neg Hx     Allergic rhinitis Neg Hx     Allergies Neg Hx     Angioedema Neg Hx     Asthma Neg Hx     Atopy Neg  Hx     Eczema Neg Hx     Immunodeficiency Neg Hx     Rhinitis Neg Hx     Urticaria Neg Hx      Social History     Tobacco Use    Smoking status: Never Smoker    Smokeless tobacco: Never Used   Substance Use Topics    Alcohol use: No    Drug use: No       Review of patient's allergies indicates:   Allergen Reactions    Ace inhibitors Other (See Comments)     Cough    Arb-angiotensin receptor antagonist Itching    Eplerenone Other (See Comments)     Marked bradycardia, 40, tiredness and weakness      Sulfa (sulfonamide antibiotics) Itching     Patient says this was 10 years ago and doesn't remember what happened       Outpatient meds:  No current facility-administered medications on file prior to encounter.      Current Outpatient Medications on File Prior to Encounter   Medication Sig Dispense Refill    albuterol (PROVENTIL/VENTOLIN HFA) 90 mcg/actuation inhaler 2 puffs every 4 hours as needed for cough, wheeze, or shortness of breath 3 Inhaler 3    albuterol-ipratropium (DUO-NEB) 2.5 mg-0.5 mg/3 mL nebulizer solution Take 3 mLs by nebulization every 6 (six) hours as needed for Wheezing or Shortness of Breath. 270 mL 0    allopurinol (ZYLOPRIM) 100 MG tablet TAKE 1 TABLET BY MOUTH EVERY DAY 90 tablet 1    atorvastatin (LIPITOR) 80 MG tablet TAKE 1 TABLET (80 MG TOTAL) BY MOUTH ONCE DAILY. 90 tablet 3    azithromycin (ZITHROMAX) 500 MG tablet Take 1 tablet (500 mg total) by mouth once daily. 3 tablet 2    budesonide-formoterol 160-4.5 mcg (SYMBICORT) 160-4.5 mcg/actuation HFAA Inhale 2 puffs into the lungs every 12 (twelve) hours. Controller 3 Inhaler 4    carvedilol (COREG) 6.25 MG tablet Take 1 tablet (6.25 mg total) by mouth 2 (two) times daily with meals. 60 tablet 3    finasteride (PROSCAR) 5 mg tablet TAKE 1 TABLET ONCE DAILY. 90 tablet 3    fluticasone (FLONASE) 50 mcg/actuation nasal spray 2 sprays (100 mcg total) by Each Nare route once daily. 3 Bottle 3    gabapentin (NEURONTIN) 300 MG  capsule Take 1 capsule (300 mg total) by mouth every evening. 90 capsule 3    isosorbide mononitrate (ISMO,MONOKET) 10 mg tablet TAKE 1 TABLET BY MOUTH EVERY DAY IN THE EVENING 30 tablet 3    levoFLOXacin (LEVAQUIN) 500 MG tablet Take 1 tablet (500 mg total) by mouth once daily. 7 tablet 0    meclizine (ANTIVERT) 25 mg tablet TAKE 1 TABLET BY MOUTH THREE TIMES A DAY AS NEEDED 90 tablet 0    omeprazole (PRILOSEC) 20 MG capsule TAKE 1 CAPSULE BY MOUTH EVERY DAY 30 capsule 1    predniSONE (DELTASONE) 20 MG tablet Take one pill a day for three days, repeat for shortness of breath 9 tablet 2    predniSONE (DELTASONE) 20 MG tablet 3 pills for 3 days, 2 pills for 3 days, 1 pill for 3 days, repeat if needed. 36 tablet 0    sodium chloride (SALINE NASAL MIST) 3 % Mist 1 spray by Nasal route 2 (two) times daily.      tamsulosin (FLOMAX) 0.4 mg Cap TAKE 1 CAPSULE BY MOUTH EVERY DAY 30 capsule 2    tiotropium bromide (SPIRIVA RESPIMAT) 1.25 mcg/actuation Mist Inhale 2.5 mcg into the lungs once daily. Controller 4 g 5    torsemide (DEMADEX) 20 MG Tab Take 1 tablet (20 mg total) by mouth once daily. 30 tablet 5    traMADol (ULTRAM) 50 mg tablet Take 1 tablet (50 mg total) by mouth every 12 (twelve) hours as needed for Pain. 60 tablet 2    zolpidem (AMBIEN) 5 MG Tab TAKE 1 TABLET BY MOUTH EVERY EVENING AS NEEDED 30 tablet 3    zolpidem (AMBIEN) 5 MG Tab TAKE 1 TABLET BY MOUTH EVERY EVENING AS NEEDED 30 tablet 3    aspirin (ECOTRIN) 81 MG EC tablet Take 81 mg by mouth once daily.        celecoxib (CELEBREX) 200 MG capsule Take 1 capsule (200 mg total) by mouth once daily. 30 capsule 3    losartan (COZAAR) 100 MG tablet TAKE 1 TABLET BY MOUTH EVERY DAY 90 tablet 0    mirtazapine (REMERON) 7.5 MG Tab TAKE 1 TABLET BY MOUTH EVERY EVENING. 30 tablet 2    NITROSTAT 0.4 mg SL tablet PLACE 1 TABLET (0.4 MG TOTAL) UNDER THE TONGUE EVERY 5 (FIVE) MINUTES AS NEEDED FOR CHEST PAIN. 25 tablet 3       Scheduled meds:    albuterol-ipratropium  3 mL Nebulization Q6H    allopurinol  100 mg Oral Daily    aspirin  81 mg Oral Daily    atorvastatin  80 mg Oral Daily    azithromycin  500 mg Intravenous Q24H    carvedilol  6.25 mg Oral BID    cefTRIAXone (ROCEPHIN) IVPB  1 g Intravenous Q24H    finasteride  5 mg Oral Daily    fluticasone-vilanterol  1 puff Inhalation Daily    gabapentin  300 mg Oral QHS    insulin detemir U-100  10 Units Subcutaneous QHS    isosorbide mononitrate  10 mg Oral QHS    losartan  100 mg Oral Daily    methylPREDNISolone sodium succinate  62.5 mg Intravenous Q8H    mirtazapine  7.5 mg Oral QHS    pantoprazole  40 mg Oral Daily    tamsulosin  0.4 mg Oral Daily    torsemide  20 mg Oral BID    warfarin  5 mg Oral Daily    warfarin  5 mg Oral Once       Infusions:   heparin (porcine) in D5W 12 Units/kg/hr (03/13/19 0702)       PRN meds:  acetaminophen, dextrose 50%, dextrose 50%, glucagon (human recombinant), glucose, glucose, heparin (PORCINE), heparin (PORCINE), hydrALAZINE, HYDROcodone-acetaminophen, insulin aspart U-100, magnesium oxide, magnesium oxide, ondansetron, polyethylene glycol, potassium chloride 10%, potassium chloride 10%, potassium, sodium phosphates, potassium, sodium phosphates, potassium, sodium phosphates, sodium chloride 0.9%, traMADol, zolpidem    Review of Systems:  ROS    OBJECTIVE:     Vital Signs and IO (Last 24H):  Temp:  [96.3 °F (35.7 °C)-97.7 °F (36.5 °C)]   Pulse:  [60-88]   Resp:  [14-18]   BP: (143-180)/()   SpO2:  [95 %-99 %]   I/O last 3 completed shifts:  In: 1928.5 [P.O.:1290; I.V.:88.5; IV Piggyback:550]  Out: 3225 [Urine:3225]    Wt Readings from Last 5 Encounters:   03/12/19 108 kg (238 lb)   03/11/19 111.6 kg (246 lb 0.5 oz)   02/28/19 107 kg (236 lb)   02/26/19 107.5 kg (236 lb 15.9 oz)   02/26/19 107.5 kg (237 lb)         Physical Exam:  Physical Exam   Constitutional: He is oriented to person, place, and time. He appears well-developed and  well-nourished.   HENT:   Head: Normocephalic and atraumatic.   Mouth/Throat: Oropharynx is clear and moist.   Eyes: EOM are normal. Pupils are equal, round, and reactive to light. No scleral icterus.   Neck: Neck supple.   Cardiovascular: Normal rate and regular rhythm.   Pulmonary/Chest: Effort normal. No stridor. No respiratory distress.   Abdominal: Soft. He exhibits no distension.   Musculoskeletal: Normal range of motion. He exhibits no edema or deformity.   Neurological: He is alert and oriented to person, place, and time. No cranial nerve deficit.   Skin: Skin is warm and dry. No rash noted. He is not diaphoretic. No erythema.   Psychiatric: He has a normal mood and affect. His behavior is normal.       Body mass index is 31.4 kg/m².    Laboratory:  Recent Labs   Lab 03/11/19  1109 03/12/19 0624 03/13/19  0428    134* 131*   K 4.1 4.1 4.2    97 95   CO2 23 24 21*   BUN 98* 70* 89*   CREATININE 5.8* 4.4* 5.0*   ESTGFRAFRICA 10* 14* 12*   EGFRNONAA 9* 12* 10*   CALCIUM 7.6* 7.8* 7.4*   ALBUMIN 3.5 3.4* 3.1*   PHOS 4.4 5.5* 6.5*       Recent Labs   Lab 03/12/19 2051 03/13/19  0428   WBC 9.90 9.60   HGB 10.8* 10.5*   HCT 32.5* 31.6*   * 123*   MCV 95 94   MCHC 33.1 33.1   MONO 2.7*  0.3 2.8*  0.3       Recent Labs   Lab 03/11/19  1109 03/12/19  0624 03/13/19  0428   ALKPHOS 95 93 87   BILITOT 0.5 0.5 0.4   PROT 5.9* 5.8* 5.4*   ALBUMIN 3.5 3.4* 3.1*   * 112* 107*   AST 49* 26 22       ASSESSMENT/PLAN:     Active Hospital Problems    Diagnosis  POA    *Asthma exacerbation [J45.901]  Yes    Atrial fibrillation [I48.91]  Yes    ESRD (end stage renal disease) [N18.6]  Yes    NSVT (nonsustained ventricular tachycardia) [I47.2]  No    Pneumonia of left lower lobe due to infectious organism [J18.1]  Yes    Immune deficiency disorder [D84.9]  Yes    Aortic calcification [I70.0]  Yes     Defer to primary control of cholesterol and bp.          Anemia due to chronic kidney disease, on  chronic dialysis [N18.6, D63.1, Z99.2]  Not Applicable    CAD (coronary artery disease), 2V CABG 2007 [I25.10]  Yes      Resolved Hospital Problems   No resolved problems to display.       ESRD on HD MWF via AVF  Continue current dialysis prescription.  Next HD Friday or PRN.  Renal diet - low K, low phos.  No IVs or BP checks on access arm.    Anemia of CKD  Stable. Monitor.  No need for NIC.  No need for IV iron.    MBD / Secondary HPT  Monitor phos levels. Low phos diet.    HTN  Controlled.   Tolerate asymptomatic HTN up to -160.  Continue home meds.  Low sodium diet.    COPD/asthma exacerbation, PNA, new AF  Agree with abx and steroids, management per primary team and cards.  Started on heparin gtt and Coumadin today.    Thank you for allowing us to participate in the care of your patient!   We will follow the patient and provide recommendations as needed.    Darrel Cary MD    [facility]  [address] [place] [address]  [place] [id number]    [phone] - tel  [phone] - fax    3/13/2019 12:14 PM

## 2019-03-13 NOTE — PROGRESS NOTES
Received a call from lab pt with critical aPTT=>150. Infusion stopped per protocol and pt checked for bleeding. No bleeding noted. Patient resting quietly. Charge nurse Mara notified. Charge nurse notified CHIQUITA Mcbride NP reading believed to be in error. New order placed for a STAT redraw aPTT.  Will continue to monitor.

## 2019-03-13 NOTE — ASSESSMENT & PLAN NOTE
Current and atrial fibrillation on telemetry.  No recent EKG demonstrates telemetry will consult Cardiology.   New onset atrial fibrillation.  Cardiology initiated heparin drip for warfarin bridge.  Will trend INR.  Consult dietary.

## 2019-03-13 NOTE — PLAN OF CARE
Problem: Adult Inpatient Plan of Care  Goal: Plan of Care Review  Outcome: Ongoing (interventions implemented as appropriate)  Patient receiving Breo mdi x 1 puff now and qday.  Hr 88 and 02 saturation 99% on room air.

## 2019-03-14 ENCOUNTER — TELEPHONE (OUTPATIENT)
Dept: FAMILY MEDICINE | Facility: CLINIC | Age: 79
End: 2019-03-14

## 2019-03-14 PROBLEM — Z79.01 LONG TERM (CURRENT) USE OF ANTICOAGULANTS: Status: ACTIVE | Noted: 2019-03-14

## 2019-03-14 PROBLEM — J45.51 SEVERE PERSISTENT ASTHMA WITH EXACERBATION: Status: ACTIVE | Noted: 2019-03-11

## 2019-03-14 LAB
ALBUMIN SERPL BCP-MCNC: 3.2 G/DL
ALP SERPL-CCNC: 96 U/L
ALT SERPL W/O P-5'-P-CCNC: 100 U/L
ANION GAP SERPL CALC-SCNC: 11 MMOL/L
APTT BLDCRRT: 30.3 SEC
APTT BLDCRRT: 30.5 SEC
APTT BLDCRRT: 56.2 SEC
AST SERPL-CCNC: 18 U/L
BILIRUB SERPL-MCNC: 0.4 MG/DL
BUN SERPL-MCNC: 79 MG/DL
CALCIUM SERPL-MCNC: 7.7 MG/DL
CHLORIDE SERPL-SCNC: 98 MMOL/L
CO2 SERPL-SCNC: 25 MMOL/L
CREAT SERPL-MCNC: 4.4 MG/DL
EST. GFR  (AFRICAN AMERICAN): 14 ML/MIN/1.73 M^2
EST. GFR  (NON AFRICAN AMERICAN): 12 ML/MIN/1.73 M^2
GLUCOSE SERPL-MCNC: 309 MG/DL
GLUCOSE SERPL-MCNC: 538 MG/DL
INR PPP: 1.2
INR PPP: 1.3
MAGNESIUM SERPL-MCNC: 2.4 MG/DL
PHOSPHATE SERPL-MCNC: 6.1 MG/DL
POCT GLUCOSE: 320 MG/DL (ref 70–110)
POCT GLUCOSE: 366 MG/DL (ref 70–110)
POCT GLUCOSE: 453 MG/DL (ref 70–110)
POCT GLUCOSE: 463 MG/DL (ref 70–110)
POTASSIUM SERPL-SCNC: 4.9 MMOL/L
PROT SERPL-MCNC: 5.5 G/DL
PROTHROMBIN TIME: 12.6 SEC
PROTHROMBIN TIME: 13.7 SEC
SODIUM SERPL-SCNC: 134 MMOL/L

## 2019-03-14 PROCEDURE — 25000003 PHARM REV CODE 250: Performed by: INTERNAL MEDICINE

## 2019-03-14 PROCEDURE — 99232 PR SUBSEQUENT HOSPITAL CARE,LEVL II: ICD-10-PCS | Mod: ,,, | Performed by: INTERNAL MEDICINE

## 2019-03-14 PROCEDURE — 99232 SBSQ HOSP IP/OBS MODERATE 35: CPT | Mod: ,,, | Performed by: INTERNAL MEDICINE

## 2019-03-14 PROCEDURE — 82947 ASSAY GLUCOSE BLOOD QUANT: CPT

## 2019-03-14 PROCEDURE — 83735 ASSAY OF MAGNESIUM: CPT

## 2019-03-14 PROCEDURE — 63600175 PHARM REV CODE 636 W HCPCS: Performed by: NURSE PRACTITIONER

## 2019-03-14 PROCEDURE — 94640 AIRWAY INHALATION TREATMENT: CPT

## 2019-03-14 PROCEDURE — 94761 N-INVAS EAR/PLS OXIMETRY MLT: CPT

## 2019-03-14 PROCEDURE — 80053 COMPREHEN METABOLIC PANEL: CPT

## 2019-03-14 PROCEDURE — 84100 ASSAY OF PHOSPHORUS: CPT

## 2019-03-14 PROCEDURE — 36415 COLL VENOUS BLD VENIPUNCTURE: CPT

## 2019-03-14 PROCEDURE — 12000002 HC ACUTE/MED SURGE SEMI-PRIVATE ROOM

## 2019-03-14 PROCEDURE — 25000242 PHARM REV CODE 250 ALT 637 W/ HCPCS: Performed by: NURSE PRACTITIONER

## 2019-03-14 PROCEDURE — 85730 THROMBOPLASTIN TIME PARTIAL: CPT | Mod: 91

## 2019-03-14 PROCEDURE — 85610 PROTHROMBIN TIME: CPT | Mod: 91

## 2019-03-14 PROCEDURE — 63600175 PHARM REV CODE 636 W HCPCS: Performed by: INTERNAL MEDICINE

## 2019-03-14 PROCEDURE — 25000003 PHARM REV CODE 250: Performed by: NURSE PRACTITIONER

## 2019-03-14 PROCEDURE — 85610 PROTHROMBIN TIME: CPT

## 2019-03-14 RX ORDER — MECLIZINE HYDROCHLORIDE 25 MG/1
25 TABLET ORAL 3 TIMES DAILY PRN
Status: DISCONTINUED | OUTPATIENT
Start: 2019-03-14 | End: 2019-03-15 | Stop reason: HOSPADM

## 2019-03-14 RX ORDER — MECLIZINE HYDROCHLORIDE 25 MG/1
25 TABLET ORAL 3 TIMES DAILY PRN
Qty: 30 TABLET | Refills: 0 | Status: ON HOLD | OUTPATIENT
Start: 2019-03-14 | End: 2019-04-08 | Stop reason: HOSPADM

## 2019-03-14 RX ORDER — AZITHROMYCIN 250 MG/1
500 TABLET, FILM COATED ORAL DAILY
Qty: 10 TABLET | Refills: 0 | Status: SHIPPED | OUTPATIENT
Start: 2019-03-15 | End: 2019-03-20

## 2019-03-14 RX ORDER — ENOXAPARIN SODIUM 150 MG/ML
1 INJECTION SUBCUTANEOUS DAILY
Qty: 3.6 ML | Refills: 0 | Status: SHIPPED | OUTPATIENT
Start: 2019-03-14 | End: 2019-03-15 | Stop reason: HOSPADM

## 2019-03-14 RX ORDER — TORSEMIDE 20 MG/1
20 TABLET ORAL 2 TIMES DAILY
Qty: 60 TABLET | Refills: 1 | Status: SHIPPED | OUTPATIENT
Start: 2019-03-14 | End: 2019-10-11 | Stop reason: ALTCHOICE

## 2019-03-14 RX ORDER — ENOXAPARIN SODIUM 150 MG/ML
1 INJECTION SUBCUTANEOUS
Status: DISCONTINUED | OUTPATIENT
Start: 2019-03-14 | End: 2019-03-15 | Stop reason: HOSPADM

## 2019-03-14 RX ORDER — WARFARIN SODIUM 5 MG/1
5 TABLET ORAL DAILY
Qty: 30 TABLET | Refills: 1 | Status: SHIPPED | OUTPATIENT
Start: 2019-03-14 | End: 2019-03-15 | Stop reason: HOSPADM

## 2019-03-14 RX ORDER — AZITHROMYCIN 250 MG/1
250 TABLET, FILM COATED ORAL DAILY
Status: DISCONTINUED | OUTPATIENT
Start: 2019-03-14 | End: 2019-03-15 | Stop reason: HOSPADM

## 2019-03-14 RX ORDER — PREDNISONE 20 MG/1
TABLET ORAL
Qty: 36 TABLET | Refills: 0 | Status: ON HOLD | OUTPATIENT
Start: 2019-03-14 | End: 2019-03-26 | Stop reason: ALTCHOICE

## 2019-03-14 RX ORDER — INSULIN ASPART 100 [IU]/ML
12 INJECTION, SOLUTION INTRAVENOUS; SUBCUTANEOUS ONCE
Status: COMPLETED | OUTPATIENT
Start: 2019-03-14 | End: 2019-03-14

## 2019-03-14 RX ORDER — MECLIZINE HYDROCHLORIDE 25 MG/1
50 TABLET ORAL ONCE
Status: COMPLETED | OUTPATIENT
Start: 2019-03-14 | End: 2019-03-14

## 2019-03-14 RX ADMIN — FLUTICASONE FUROATE AND VILANTEROL TRIFENATATE 1 PUFF: 100; 25 POWDER RESPIRATORY (INHALATION) at 07:03

## 2019-03-14 RX ADMIN — INSULIN ASPART 5 UNITS: 100 INJECTION, SOLUTION INTRAVENOUS; SUBCUTANEOUS at 12:03

## 2019-03-14 RX ADMIN — TAMSULOSIN HYDROCHLORIDE 0.4 MG: 0.4 CAPSULE ORAL at 08:03

## 2019-03-14 RX ADMIN — TORSEMIDE 20 MG: 20 TABLET ORAL at 05:03

## 2019-03-14 RX ADMIN — INSULIN ASPART 12 UNITS: 100 INJECTION, SOLUTION INTRAVENOUS; SUBCUTANEOUS at 10:03

## 2019-03-14 RX ADMIN — MECLIZINE HYDROCHLORIDE 50 MG: 25 TABLET ORAL at 04:03

## 2019-03-14 RX ADMIN — IPRATROPIUM BROMIDE AND ALBUTEROL SULFATE 3 ML: .5; 3 SOLUTION RESPIRATORY (INHALATION) at 07:03

## 2019-03-14 RX ADMIN — CARVEDILOL 6.25 MG: 6.25 TABLET, FILM COATED ORAL at 08:03

## 2019-03-14 RX ADMIN — ACETAMINOPHEN 650 MG: 325 TABLET ORAL at 05:03

## 2019-03-14 RX ADMIN — HEPARIN SODIUM 12 UNITS/KG/HR: 10000 INJECTION, SOLUTION INTRAVENOUS at 08:03

## 2019-03-14 RX ADMIN — MIRTAZAPINE 7.5 MG: 7.5 TABLET ORAL at 09:03

## 2019-03-14 RX ADMIN — ALLOPURINOL 100 MG: 100 TABLET ORAL at 08:03

## 2019-03-14 RX ADMIN — AZITHROMYCIN 250 MG: 250 TABLET, FILM COATED ORAL at 12:03

## 2019-03-14 RX ADMIN — METHYLPREDNISOLONE SODIUM SUCCINATE 62.5 MG: 125 INJECTION, POWDER, FOR SOLUTION INTRAMUSCULAR; INTRAVENOUS at 04:03

## 2019-03-14 RX ADMIN — ZOLPIDEM TARTRATE 5 MG: 5 TABLET ORAL at 09:03

## 2019-03-14 RX ADMIN — ASPIRIN 81 MG: 81 TABLET, COATED ORAL at 08:03

## 2019-03-14 RX ADMIN — FINASTERIDE 5 MG: 5 TABLET, FILM COATED ORAL at 08:03

## 2019-03-14 RX ADMIN — PANTOPRAZOLE SODIUM 40 MG: 40 TABLET, DELAYED RELEASE ORAL at 08:03

## 2019-03-14 RX ADMIN — ATORVASTATIN CALCIUM 80 MG: 40 TABLET, FILM COATED ORAL at 08:03

## 2019-03-14 RX ADMIN — WARFARIN SODIUM 5 MG: 5 TABLET ORAL at 04:03

## 2019-03-14 RX ADMIN — IPRATROPIUM BROMIDE AND ALBUTEROL SULFATE 3 ML: .5; 3 SOLUTION RESPIRATORY (INHALATION) at 02:03

## 2019-03-14 RX ADMIN — CARVEDILOL 6.25 MG: 6.25 TABLET, FILM COATED ORAL at 09:03

## 2019-03-14 RX ADMIN — IPRATROPIUM BROMIDE AND ALBUTEROL SULFATE 3 ML: .5; 3 SOLUTION RESPIRATORY (INHALATION) at 01:03

## 2019-03-14 RX ADMIN — GABAPENTIN 300 MG: 300 CAPSULE ORAL at 09:03

## 2019-03-14 RX ADMIN — INSULIN HUMAN 20 UNITS: 100 INJECTION, SOLUTION PARENTERAL at 05:03

## 2019-03-14 RX ADMIN — INSULIN ASPART 4 UNITS: 100 INJECTION, SOLUTION INTRAVENOUS; SUBCUTANEOUS at 05:03

## 2019-03-14 RX ADMIN — INSULIN ASPART 3 UNITS: 100 INJECTION, SOLUTION INTRAVENOUS; SUBCUTANEOUS at 10:03

## 2019-03-14 RX ADMIN — LOSARTAN POTASSIUM 100 MG: 25 TABLET, FILM COATED ORAL at 08:03

## 2019-03-14 RX ADMIN — ENOXAPARIN SODIUM 110 MG: 150 INJECTION SUBCUTANEOUS at 06:03

## 2019-03-14 RX ADMIN — ISOSORBIDE MONONITRATE 10 MG: 10 TABLET ORAL at 09:03

## 2019-03-14 RX ADMIN — HYDRALAZINE HYDROCHLORIDE 10 MG: 20 INJECTION INTRAMUSCULAR; INTRAVENOUS at 06:03

## 2019-03-14 RX ADMIN — TORSEMIDE 20 MG: 20 TABLET ORAL at 08:03

## 2019-03-14 RX ADMIN — METHYLPREDNISOLONE SODIUM SUCCINATE 62.5 MG: 125 INJECTION, POWDER, FOR SOLUTION INTRAMUSCULAR; INTRAVENOUS at 05:03

## 2019-03-14 NOTE — PROGRESS NOTES
[facility]  Cardiology  Progress Note    Patient Name: Micheal Hunt  MRN: [id number]  Admission Date: 3/11/2019  Hospital Length of Stay: 3 days  Code Status: Full Code   Attending Physician: Charissa Olivas MD   Primary Care Physician: Pk Lakhani MD  Expected Discharge Date: 3/14/2019  Principal Problem:Asthma exacerbation    Subjective:     Hospital Course:  Micehal Hunt is a 78 y.o. male admitted from Dr. Guillen's office with complaint of SOB.  HD since 12/2018.   Found AF with controlled HR. Denies palpitation, dizziness.     PMH: CAD s/p CABG 2007,  bronchial asthma, hypertension, end-stage renal disease (on hemodialysis Monday/Wednesday/Friday), benign prostatic hypertrophy, gastroesophageal reflux disease, history of gout hyperlipidemia, obstructive sleep apnea (not on any CPAP).     Interval History: Improving. Noted to have fine to bases and inspiratory wheezes improved. Reports feeling a little better. No respiratory distress. Remains in AF with controlled rate. Plan to d/c home today, held due to feeling dizziness, Glu 450.    ROS    Objective:     Vital Signs (Most Recent):  Temp: 96.7 °F (35.9 °C) (03/14/19 0953)  Pulse: 82 (03/14/19 0953)  Resp: (!) 28 (03/14/19 0953)  BP: (!) 188/91 (03/14/19 0953)  SpO2: 98 % (03/14/19 0953) Vital Signs (24h Range):  Temp:  [96.7 °F (35.9 °C)-98.4 °F (36.9 °C)] 96.7 °F (35.9 °C)  Pulse:  [65-89] 82  Resp:  [16-28] 28  SpO2:  [94 %-98 %] 98 %  BP: (140-188)/(65-99) 188/91     Weight: 108 kg (238 lb)  Body mass index is 31.4 kg/m².    SpO2: 98 %  O2 Device (Oxygen Therapy): room air      Intake/Output Summary (Last 24 hours) at 3/14/2019 1051  Last data filed at 3/14/2019 0600  Gross per 24 hour   Intake 1717.6 ml   Output 3175 ml   Net -1457.4 ml       Lines/Drains/Airways     Drain                 Hemodialysis AV Fistula Left upper arm -- days          Peripheral Intravenous Line                 Peripheral IV - Single Lumen 03/12/19  1854 Anterior;Right Forearm 1 day                Physical Exam   Constitutional: He is oriented to person, place, and time. He appears well-developed and well-nourished.   Neck: Normal range of motion. Neck supple. No thyromegaly present.   Cardiovascular:   Irregular rate but controlled in the 60's.   Pulmonary/Chest: No respiratory distress. He has no wheezes.   Rhonchi and wheezing improved.   Musculoskeletal: Normal range of motion.   Neurological: He is alert and oriented to person, place, and time.   Skin: Skin is warm and dry.   Psychiatric: He has a normal mood and affect. His behavior is normal. Thought content normal.       Significant Labs:   CMP   Recent Labs   Lab 03/13/19  0428 03/14/19  0622   * 134*   K 4.2 4.9   CL 95 98   CO2 21* 25   * 309*   BUN 89* 79*   CREATININE 5.0* 4.4*   CALCIUM 7.4* 7.7*   PROT 5.4* 5.5*   ALBUMIN 3.1* 3.2*   BILITOT 0.4 0.4   ALKPHOS 87 96   AST 22 18   * 100*   ANIONGAP 15 11   ESTGFRAFRICA 12* 14*   EGFRNONAA 10* 12*   , CBC   Recent Labs   Lab 03/12/19 2051 03/13/19  0428   WBC 9.90 9.60   HGB 10.8* 10.5*   HCT 32.5* 31.6*   * 123*    and Troponin   Recent Labs   Lab 03/12/19  1957 03/13/19  1610   TROPONINI 0.056* 0.035*       Significant Imaging:    Assessment and Plan:   New diagnosed AF, CHADS2 VASc score 4  HFpEF  CAD s/p CABG  Mild AS  PHTN, 50mmHg via Echo  ESRD-HD  DM, uncontrolled  HTN   HLD  GRICELDA    Increase Coumadin 7.5mg/d, adjust dose keep INR 2-3; fall and bleeding precaution.   Continue ASA and statin.  Tosremide 20mg po bid; low salt and fluid restriction; routine HD.    VTE Risk Mitigation (From admission, onward)        Ordered     warfarin (COUMADIN) tablet 5 mg  Daily      03/12/19 2046     IP VTE HIGH RISK PATIENT  Once      03/11/19 1059        MD Janine Terrell, GAVIOTAP  Cardiology  [facility]

## 2019-03-14 NOTE — PLAN OF CARE
Problem: Adult Inpatient Plan of Care  Goal: Plan of Care Review  Outcome: Ongoing (interventions implemented as appropriate)  Patient receiving aerosol tx via duoneb now and q6hr and Breo mdi x 1 puff now and qday.  Hr 85 and 02 saturation 98% on room air.

## 2019-03-14 NOTE — PLAN OF CARE
Problem: Adult Inpatient Plan of Care  Goal: Plan of Care Review  Patient aerosol Q6 given via msk tolerated well sats 97% on RA

## 2019-03-14 NOTE — PLAN OF CARE
Problem: Adult Inpatient Plan of Care  Goal: Plan of Care Review  AAOx4. BP elevated, evening meds given, BP wnl, continue to monitor. Plan of care reviewed with patient. Safety maintained throughout shift. Bed locked and low, SRx2, call light within reach. Continuous heparin drip infusing given per orders, bleeding risk maintained and assessed, and discussed with patient. Room air, normal breathing pattern. No c/o of SOB. Telemetry monitoring, afib.  Hourly/q2 rounding completed this shift for pt safety. Will continue to monitor.

## 2019-03-14 NOTE — PROGRESS NOTES
03/14/19 1140   Home Oxygen Qualification   Room Air SpO2 At Rest 99 %   Room Air SpO2 on Exertion 96 %   Exertion O2 LPM 0 LPM   SpO2 on Recovery 99 %   Recovery Heart Rate 107 bpm   Recovery O2 LPM 0 LPM

## 2019-03-14 NOTE — PHYSICIAN QUERY
PT Name: Micheal Hunt  MR #: [id number]    Physician Query Form - Asthma Clarification      CDS/: Katty Cooley               Contact information:[email or web address]  This form is a permanent document in the medical record.    Query Date: March 14, 2019    By submitting this query, we are merely seeking further clarification of documentation. Please utilize your independent clinical judgment when addressing the question(s) below.    The Medical Record contains the following:     Indicators Supporting Clinical Findings Location in Medical Record   x Asthma or Reactive Airway Disease documented Persistent asthma exacerbation      He had not had recurrent pneumonias when I saw him in February  Patient was felt to have a mild persistent asthma problem and was recommended to use Symbicort.    H&P 3/11    Pulm PN 3/11   x Acute/Chronic Illness past medical history significant for bronchial asthma, hypertension, end-stage renal disease (on &hemodialysis Monday/Wednesday/Friday), coronary artery disease, benign prostatic hypertrophy, gastroesophageal reflux disease, history of gout hyperlipidemia, obstructive sleep apnea (not on any CPAP    Persistent asthma exacerbation    Community-acquired pneumonia    H&P 3/11   x Radiology Findings Chest &X-Ray: Mild pulmonary infiltrates favoring pneumonia. Cardiomegaly, atherosclerosis and prior sternotomy   H&P 3/11   x RR     Blood Gases     O2 sats RR = 20  O2 Sat = 97% RA   V/S Flowsheet 3/11   x BiPAP/Intubation/Supplemental O2 Supplemental O2 via nasal canula; titrate O2 saturation to >92%.  H&P 3/11    SOB, Wheezing, Productive Cough, Use of Accessory Muscles, Respiratory Distress, Hypoxia, etc.     x Treatment Start Solumedrol 62.5 mg IV q 8 hrs.   Pulmonary consultation.   Continue beta 2 agonist bronchodilator treatments.   Continue IV antibiotics - ceftriaxone and azithromycin.   Check sputum GS and Cx.    H&P 3/11    Other     Provider, please  specify diagnosis or diagnoses associated with above clinical findings.  [   ] Mild persistent asthma, with acute exacerbation   [   ] Moderate persistent asthma, with acute exacerbation   [ x  ] Severe persistent asthma, with acute exacerbation   [   ] Asthma, unspecified   [   ] Other asthma type (please specify): ___   [   ]  Clinically Undetermined       Please document in your progress notes daily for the duration of treatment until resolved and include in your discharge summary.

## 2019-03-14 NOTE — PROGRESS NOTES
Progress Note  PULMONARY    Admit Date: 3/11/2019   03/14/2019      SUBJECTIVE:     March 13- better but still wheezes, no distress,  March 14, having intermittent burning chest pain -also having swallow sticking in neck - both these issues ongoing last 9 + months.  Breathing good.    PFSH and Allergies reviewed.    OBJECTIVE:     Vitals (Most recent):  Vitals:    03/14/19 1133   BP: (!) 177/100   Pulse: 75   Resp: 18   Temp: 97.6 °F (36.4 °C)       Vitals (24 hour range):  Temp:  [96.7 °F (35.9 °C)-98.4 °F (36.9 °C)]   Pulse:  [69-89]   Resp:  [16-28]   BP: (140-188)/()   SpO2:  [94 %-98 %]       Intake/Output Summary (Last 24 hours) at 3/14/2019 1135  Last data filed at 3/14/2019 0600  Gross per 24 hour   Intake 1217.6 ml   Output 1675 ml   Net -457.4 ml          Physical Exam:  The patient's neuro status (alertness,orientation,cognitive function,motor skills,), pharyngeal exam (oral lesions, hygiene, abn dentition,), Neck (jvd,mass,thyroid,nodes in neck and above/below clavicle),RESPIRATORY(symmetry,effort,fremitus,percussion,auscultation),  Cor(rhythm,heart tones including gallops,perfusion,edema)ABD(distention,hepatic&splenomegaly,tenderness,masses), Skin(rash,cyanosis),Psyc(affect,judgement,).  Exam negative except for these pertinent findings:    Alert, no distress, no wheezes, symmetric,no edema     Radiographs reviewed: view by direct vision 3/14/19 lungs look clear.      Results for orders placed during the hospital encounter of 11/05/18   X-Ray Chest 1 View    Narrative EXAMINATION:  XR CHEST 1 VIEW    CLINICAL HISTORY:  cough;    TECHNIQUE:  Single frontal view of the chest was performed.    COMPARISON:  11/5/2018    FINDINGS:  The cardiomediastinal silhouette is stable..  There are median sternotomy sutures.  Thoracic aorta is tortuous    The left lower lobe infiltrate with best seen on the lateral view on the prior exam and follow-up to include lateral view is suggested.  As visualized on the  frontal view it appears decreased.    Right lung remains clear no effusion.      Impression Decrease of the left basilar infiltrate compared to the prior exam.  Suggest follow-up study to include lateral view as it is best seen on the lateral view.      Electronically signed by: Mayuri Castro MD  Date:    11/08/2018  Time:    14:19   ]    Labs     No results for input(s): WBC, HGB, HCT, PLT, BAND, METAMYELOCYT, MYELOPCT, HGBA1C in the last 24 hours.  Recent Labs   Lab 03/13/19  1610 03/14/19  0622   NA  --  134*   K  --  4.9   CL  --  98   CO2  --  25   BUN  --  79*   CREATININE  --  4.4*   GLU  --  309*   CALCIUM  --  7.7*   MG  --  2.4   PHOS  --  6.1*   AST  --  18   ALT  --  100*   ALKPHOS  --  96   BILITOT  --  0.4   PROT  --  5.5*   ALBUMIN  --  3.2*   INR  --  1.2   TROPONINI 0.035*  --    No results for input(s): PH, PCO2, PO2, HCO3 in the last 24 hours.  Microbiology Results (last 7 days)     Procedure Component Value Units Date/Time    Culture, Respiratory [[id number]]     Order Status:  No result Specimen:  Respiratory from Sputum, Expectorated           Impression:  Active Hospital Problems    Diagnosis  POA    *Asthma exacerbation [J45.901]  Yes    Atrial fibrillation [I48.91]  Yes    ESRD (end stage renal disease) [N18.6]  Yes    NSVT (nonsustained ventricular tachycardia) [I47.2]  No    Pneumonia of left lower lobe due to infectious organism [J18.1]  Yes    Immune deficiency disorder [D84.9]  Yes    Aortic calcification [I70.0]  Yes     Defer to primary control of cholesterol and bp.          Anemia due to chronic kidney disease, on chronic dialysis [N18.6, D63.1, Z99.2]  Not Applicable    CAD (coronary artery disease), 2V CABG 2007 [I25.10]  Yes      Resolved Hospital Problems   No resolved problems to display.               Plan:   March 12, doing well, asthma and immune deficiency.  outpt soon reasonable. Pneumonia better.    March 13,better,outpt 14 or 15th, coumadin for a fib.  procal  0.16    March 14, will try to get humoral antibody f/ u in 2 wks and f/u office 3 wks.  Set up to see gi would be good with burning chest pain.  Needs cardiac f/u .    outpt would be good on tapering steroids. Pt has severe persistent asthma with exacerbation .  He uses steroids more then 2x yearly.                              .

## 2019-03-14 NOTE — TELEPHONE ENCOUNTER
----- Message from Frankie Dejesus sent at 3/14/2019 12:54 PM CDT -----  Contact: Patient  Type: Needs Medical Advice    Who Called:  Patient  Best Call Back Number: [phone]  Additional Information: Patient would like to speak with the office about their hospital discharge. Thanks!

## 2019-03-14 NOTE — CONSULTS
Nephrology Consult Progress Note        Patient Name: Micheal Hunt  MRN: [id number]    Patient Class: IP- Inpatient   Admission Date: 3/11/2019  Length of Stay: 3 days  Date of Service: 3/14/2019    Attending Physician: Charissa Olivas MD  Primary Care Provider: Pk Lakhani MD    Reason for Consult: esrd    SUBJECTIVE:     HPI: 78M recently started on HD MWF in [place], [place] admitted to Hospitalist Service from Dr. Guillen's office with complaint of persisting asthma exacerbation, failing outpatient steroids and abx therapy, now diagnosed with PNA. Renal consulted for co-management.    3/11 Seen and examined on machine today, tolerating well, no complains other than dry cough.  3/12 VSS, feels better after HD yesterday, still coughing. Next HD in AM.  3/13 Seen and examined on machine today, tolerating well, no complains other than dry cough.  3/14 VSS, feels better after HD yesterday. Next HD in AM or PRN. Can be dc from renal stand point.    Past Medical History:   Diagnosis Date    NICK (acute kidney injury) 7/29/2016    Allergy     Anemia, mild 12/15/2014    Arthritis     Gout    Benign essential HTN 3/27/2012    BMI 29.0-29.9,adult 5/10/2018    BPH (benign prostatic hyperplasia)     BPH (benign prostatic hyperplasia)     CAD (coronary artery disease) 2006    Chronic kidney disease     due to ibuprofen    Colon polyp     CRF (chronic renal failure), stage 5     Diverticulosis     Gastritis     GERD (gastroesophageal reflux disease)     Gout     History of colon polyps 5/3/2018    HTN (hypertension) 3/27/2012    Hyperlipidemia     Hyperlipidemia     LLL pneumonia 6/14/2018    LVH (left ventricular hypertrophy)     Mesenteric ischemia     Murmur, cardiac 3/27/2012    GRICELDA (obstructive sleep apnea)     DOES NOT USE A MACHINE    Sinus problem     Syncope and collapse      Past Surgical History:   Procedure Laterality Date    APPENDECTOMY      BLOCK-NERVE Left 5/31/2016    [facility]  [facility] at [facility]    CARDIAC CATHETERIZATION      COLONOSCOPY      COLONOSCOPY N/A 5/3/2018    [facility]    COLONOSCOPY N/A 9/10/2015    [facility] at [facility]    CORONARY ARTERY BYPASS GRAFT  4/2007    x 1    CYSTOSCOPY N/A 2017    [facility] at [facility]    CYSTOSCOPY N/A 11/10/2015    [facility] at [facility] OR    ESOPHAGOGASTRODUODENOSCOPY (EGD) N/A 2016    [facility]formed [facility] Tra [facility] at [facility] (2ND F[facility])    ESOPHAGOGASTRODUODENOSCOPY (EGD) N/A 10/15/2014    Performed by [facility] at [facility] ENDO    INJECTION-STEROID-EPIDURAL-TRANSFORAMINAL Left 2016    [facility]    JOINT REPLACEMENT      left knee total replacement  X 3    mid leftt finger      from a cactuss    MOLE REMOVAL  2016    RADIOFREQUENCY THERMOCOAGULATION (RFTC)-NERVE-MEDIAN BRANCH-LUMBAR Left 2016    [facility]    rotative cuff      no rotative cuffs on bilat shoulders has pins     SHOULDER SURGERY      shoulder surgery bilat  RIGHT X 4; LEFT X 3    TRANSRECTAL ULTRASOUND GUIDED PROSTATE BIOPSY Bilateral 11/10/2015    [facility]dy [facility] at [facility] OR    ULTRASOUND-ENDOSCOPIC-UPPER N/A 2017    [facility] END[facility] (2ND F[facility])    ULTRASOUND-ENDOSCOPIC-UPPER N/A 2016    [facility] at [facility] END[facility] (2ND FLR)    ULTRASOUND-ENDOSCOPIC-UPPER N/A 2015    Performed by Jason Saleem [facility] at [facility] ENDO (2ND FLR)     Family History   Problem Relation Age of Onset    Heart disease Mother     Sudden death Father     Cancer Father         advanced lung ca- found after 2 story fall    Stroke Sister     Pneumonia Sister          from PNA    Heart disease Sister     Hypertension Brother     Kidney cancer Neg Hx     Prostate cancer Neg Hx     Urolithiasis Neg Hx      Allergic rhinitis Neg Hx     Allergies Neg Hx     Angioedema Neg Hx     Asthma Neg Hx     Atopy Neg Hx     Eczema Neg Hx     Immunodeficiency Neg Hx     Rhinitis Neg Hx     Urticaria Neg Hx      Social History     Tobacco Use    Smoking status: Never Smoker    Smokeless tobacco: Never Used   Substance Use Topics    Alcohol use: No    Drug use: No       Review of patient's allergies indicates:   Allergen Reactions    Ace inhibitors Other (See Comments)     Cough    Arb-angiotensin receptor antagonist Itching    Eplerenone Other (See Comments)     Marked bradycardia, 40, tiredness and weakness      Sulfa (sulfonamide antibiotics) Itching     Patient says this was 10 years ago and doesn't remember what happened       Outpatient meds:  No current facility-administered medications on file prior to encounter.      Current Outpatient Medications on File Prior to Encounter   Medication Sig Dispense Refill    albuterol (PROVENTIL/VENTOLIN HFA) 90 mcg/actuation inhaler 2 puffs every 4 hours as needed for cough, wheeze, or shortness of breath 3 Inhaler 3    albuterol-ipratropium (DUO-NEB) 2.5 mg-0.5 mg/3 mL nebulizer solution Take 3 mLs by nebulization every 6 (six) hours as needed for Wheezing or Shortness of Breath. 270 mL 0    allopurinol (ZYLOPRIM) 100 MG tablet TAKE 1 TABLET BY MOUTH EVERY DAY 90 tablet 1    atorvastatin (LIPITOR) 80 MG tablet TAKE 1 TABLET (80 MG TOTAL) BY MOUTH ONCE DAILY. 90 tablet 3    budesonide-formoterol 160-4.5 mcg (SYMBICORT) 160-4.5 mcg/actuation HFAA Inhale 2 puffs into the lungs every 12 (twelve) hours. Controller 3 Inhaler 4    carvedilol (COREG) 6.25 MG tablet Take 1 tablet (6.25 mg total) by mouth 2 (two) times daily with meals. 60 tablet 3    finasteride (PROSCAR) 5 mg tablet TAKE 1 TABLET ONCE DAILY. 90 tablet 3    fluticasone (FLONASE) 50 mcg/actuation nasal spray 2 sprays (100 mcg total) by Each Nare route once daily. 3 Bottle 3    gabapentin (NEURONTIN) 300 MG  capsule Take 1 capsule (300 mg total) by mouth every evening. 90 capsule 3    isosorbide mononitrate (ISMO,MONOKET) 10 mg tablet TAKE 1 TABLET BY MOUTH EVERY DAY IN THE EVENING 30 tablet 3    meclizine (ANTIVERT) 25 mg tablet TAKE 1 TABLET BY MOUTH THREE TIMES A DAY AS NEEDED 90 tablet 0    omeprazole (PRILOSEC) 20 MG capsule TAKE 1 CAPSULE BY MOUTH EVERY DAY 30 capsule 1    sodium chloride (SALINE NASAL MIST) 3 % Mist 1 spray by Nasal route 2 (two) times daily.      tamsulosin (FLOMAX) 0.4 mg Cap TAKE 1 CAPSULE BY MOUTH EVERY DAY 30 capsule 2    tiotropium bromide (SPIRIVA RESPIMAT) 1.25 mcg/actuation Mist Inhale 2.5 mcg into the lungs once daily. Controller 4 g 5    zolpidem (AMBIEN) 5 MG Tab TAKE 1 TABLET BY MOUTH EVERY EVENING AS NEEDED 30 tablet 3    zolpidem (AMBIEN) 5 MG Tab TAKE 1 TABLET BY MOUTH EVERY EVENING AS NEEDED 30 tablet 3    [DISCONTINUED] azithromycin (ZITHROMAX) 500 MG tablet Take 1 tablet (500 mg total) by mouth once daily. 3 tablet 2    [DISCONTINUED] levoFLOXacin (LEVAQUIN) 500 MG tablet Take 1 tablet (500 mg total) by mouth once daily. 7 tablet 0    [DISCONTINUED] predniSONE (DELTASONE) 20 MG tablet Take one pill a day for three days, repeat for shortness of breath 9 tablet 2    [DISCONTINUED] predniSONE (DELTASONE) 20 MG tablet 3 pills for 3 days, 2 pills for 3 days, 1 pill for 3 days, repeat if needed. 36 tablet 0    [DISCONTINUED] torsemide (DEMADEX) 20 MG Tab Take 1 tablet (20 mg total) by mouth once daily. 30 tablet 5    [DISCONTINUED] traMADol (ULTRAM) 50 mg tablet Take 1 tablet (50 mg total) by mouth every 12 (twelve) hours as needed for Pain. 60 tablet 2    aspirin (ECOTRIN) 81 MG EC tablet Take 81 mg by mouth once daily.        celecoxib (CELEBREX) 200 MG capsule Take 1 capsule (200 mg total) by mouth once daily. 30 capsule 3    losartan (COZAAR) 100 MG tablet TAKE 1 TABLET BY MOUTH EVERY DAY 90 tablet 0    mirtazapine (REMERON) 7.5 MG Tab TAKE 1 TABLET BY MOUTH  EVERY EVENING. 30 tablet 2    NITROSTAT 0.4 mg SL tablet PLACE 1 TABLET (0.4 MG TOTAL) UNDER THE TONGUE EVERY 5 (FIVE) MINUTES AS NEEDED FOR CHEST PAIN. 25 tablet 3       Scheduled meds:   albuterol-ipratropium  3 mL Nebulization Q6H    allopurinol  100 mg Oral Daily    aspirin  81 mg Oral Daily    atorvastatin  80 mg Oral Daily    azithromycin  250 mg Oral Daily    carvedilol  6.25 mg Oral BID    finasteride  5 mg Oral Daily    fluticasone-vilanterol  1 puff Inhalation Daily    gabapentin  300 mg Oral QHS    insulin detemir U-100  10 Units Subcutaneous QHS    isosorbide mononitrate  10 mg Oral QHS    losartan  100 mg Oral Daily    methylPREDNISolone sodium succinate  62.5 mg Intravenous Q8H    mirtazapine  7.5 mg Oral QHS    pantoprazole  40 mg Oral Daily    tamsulosin  0.4 mg Oral Daily    torsemide  20 mg Oral BID    warfarin  5 mg Oral Daily       Infusions:      PRN meds:  acetaminophen, dextrose 50%, dextrose 50%, glucagon (human recombinant), glucose, glucose, hydrALAZINE, HYDROcodone-acetaminophen, insulin aspart U-100, magnesium oxide, magnesium oxide, ondansetron, polyethylene glycol, potassium chloride 10%, potassium chloride 10%, potassium, sodium phosphates, potassium, sodium phosphates, potassium, sodium phosphates, sodium chloride 0.9%, traMADol, zolpidem    Review of Systems:  ROS    OBJECTIVE:     Vital Signs and IO (Last 24H):  Temp:  [96.7 °F (35.9 °C)-98.4 °F (36.9 °C)]   Pulse:  [69-89]   Resp:  [16-28]   BP: (140-188)/()   SpO2:  [94 %-98 %]   I/O last 3 completed shifts:  In: 2406.1 [P.O.:1006; I.V.:350.1; Other:500; IV Piggyback:550]  Out: 3875 [Urine:2375; Other:1500]    Wt Readings from Last 5 Encounters:   03/12/19 108 kg (238 lb)   03/11/19 111.6 kg (246 lb 0.5 oz)   02/28/19 107 kg (236 lb)   02/26/19 107.5 kg (236 lb 15.9 oz)   02/26/19 107.5 kg (237 lb)         Physical Exam:  Physical Exam   Constitutional: He is oriented to person, place, and time. He appears  well-developed and well-nourished.   HENT:   Head: Normocephalic and atraumatic.   Mouth/Throat: Oropharynx is clear and moist.   Eyes: EOM are normal. Pupils are equal, round, and reactive to light. No scleral icterus.   Neck: Neck supple.   Cardiovascular: Normal rate and regular rhythm.   Pulmonary/Chest: Effort normal. No stridor. No respiratory distress.   Abdominal: Soft. He exhibits no distension.   Musculoskeletal: Normal range of motion. He exhibits no edema or deformity.   Neurological: He is alert and oriented to person, place, and time. No cranial nerve deficit.   Skin: Skin is warm and dry. No rash noted. He is not diaphoretic. No erythema.   Psychiatric: He has a normal mood and affect. His behavior is normal.       Body mass index is 31.4 kg/m².    Laboratory:  Recent Labs   Lab 03/12/19 0624 03/13/19 0428 03/14/19 0622   * 131* 134*   K 4.1 4.2 4.9   CL 97 95 98   CO2 24 21* 25   BUN 70* 89* 79*   CREATININE 4.4* 5.0* 4.4*   ESTGFRAFRICA 14* 12* 14*   EGFRNONAA 12* 10* 12*   CALCIUM 7.8* 7.4* 7.7*   ALBUMIN 3.4* 3.1* 3.2*   PHOS 5.5* 6.5* 6.1*       Recent Labs   Lab 03/12/19 2051 03/13/19 0428   WBC 9.90 9.60   HGB 10.8* 10.5*   HCT 32.5* 31.6*   * 123*   MCV 95 94   MCHC 33.1 33.1   MONO 2.7*  0.3 2.8*  0.3       Recent Labs   Lab 03/12/19 0624 03/13/19 0428 03/14/19  0622   ALKPHOS 93 87 96   BILITOT 0.5 0.4 0.4   PROT 5.8* 5.4* 5.5*   ALBUMIN 3.4* 3.1* 3.2*   * 107* 100*   AST 26 22 18       ASSESSMENT/PLAN:     Active Hospital Problems    Diagnosis  POA    *Severe persistent asthma with exacerbation [J45.51]  Yes    Atrial fibrillation [I48.91]  Yes    ESRD (end stage renal disease) [N18.6]  Yes    NSVT (nonsustained ventricular tachycardia) [I47.2]  No    Pneumonia of left lower lobe due to infectious organism [J18.1]  Yes    Immune deficiency disorder [D84.9]  Yes    Aortic calcification [I70.0]  Yes     Defer to primary control of cholesterol and bp.           Anemia due to chronic kidney disease, on chronic dialysis [N18.6, D63.1, Z99.2]  Not Applicable    CAD (coronary artery disease), 2V CABG 2007 [I25.10]  Yes      Resolved Hospital Problems   No resolved problems to display.       ESRD on HD MWF via [facility]  Continue current dialysis prescription.  Next HD Friday or PRN.  Renal diet - low K, low phos.  No IVs or BP checks on access arm.    Anemia of CKD  Stable. Monitor.  No need for NIC.  No need for IV iron.    MBD / Secondary HPT  Monitor phos levels. Low phos diet.    HTN  Controlled.   Tolerate asymptomatic HTN up to -160.  Continue home meds.  Low sodium diet.    COPD/asthma exacerbation, PNA, new AF  Agree with abx and steroids, management per primary team and cards.  Started on heparin gtt and then Coumadin.    Thank you for allowing us to participate in the care of your patient!   We will follow the patient and provide recommendations as needed.    Darrel Cary MD    [facility]  [address] [place] [address]  [place] [address]    [phone] - tel  [phone] - fax    3/14/2019 12:14 PM

## 2019-03-14 NOTE — CONSULTS
03/11/2019                                                  Admit Date: 3/11/2019  Micheal Hunt  New Patient Consult     No chief complaint on file.  cc sob, cough , weakness.     History of Present Illness:   Pt relates had  4 pneumonias last yr, uses prednisone freq although feel repsonse is not dramatic.  Pt has cough with yellow mucous commonly with pneumonias and diffuse weakness.  Pt was on abx/prednisone last wk but worsened ppt presentation.  Pt is on chr dialysis.   Pt admitted earlier today - feels better on rocephin and azithro.     From consult done in November 2018----History of Present Illness:   Patient has been seen by me in February 2018.  Patient worked as a rancher and cottrell.  He lives in [place].  He has been exposed to hay in the past.  Has had sinus problems.  He was seen by immunology in the past.  His pneumococcal titers were low.  His IgM was low in his IgG was good. He had not had recurrent pneumonias when I saw him in February.  Patient does have a history of coughing and wheezing but not short of breath. This pulmonary functions done in January showed an FEV1 of 71%.  Patient was felt to have a mild persistent asthma problem and was recommended to use Symbicort.  He was also felt to have an immune deficiency problem and was recommended to use amoxicillin for any infections.     Patient was hospitalized with pneumonia for a couple of days over the summer.  Patient respiratory wise has been doing fairly well.     Patient came home yesterday and then had an acute chill with fever.  He had headache but no nausea or vomiting or diarrhea.  No chest pain did have some cough.  He presented to the emergency room acutely.  He did not take any antibiotics.  Patient had a CT scan did not demonstrate left lower lung pneumonia.  The radiology interpretation of his CT scan suggested  a pulmonary artery aneurysm.  I reviewed the CT scan with radiologist this a.m. and there is no  pulmonary artery aneurysm .  Patient is feeling much better today.  Patient did bring up 2 tsp of blood yesterday.  Has not had hemoptysis in the past.  Patient denies taking anticoagulant therapy other than aspirin.     Patient has had both pneumonia vaccines Prevnar and Pneumovax.  Pneumococcal titers showed 9/14 were not protected in April of last year.        PFSH:       Past Medical History:   Diagnosis Date    NICK (acute kidney injury) 7/29/2016    Allergy      Anemia, mild 12/15/2014    Arthritis       Gout    Benign essential HTN 3/27/2012    BMI 29.0-29.9,adult 5/10/2018    BPH (benign prostatic hyperplasia)      BPH (benign prostatic hyperplasia)      CAD (coronary artery disease) 2006    Chronic kidney disease       due to ibuprofen    Colon polyp      CRF (chronic renal failure), stage 5      Diverticulosis      Gastritis      GERD (gastroesophageal reflux disease)      Gout      History of colon polyps 5/3/2018    HTN (hypertension) 3/27/2012    Hyperlipidemia      Hyperlipidemia      LLL pneumonia 6/14/2018    LVH (left ventricular hypertrophy)      Mesenteric ischemia      Murmur, cardiac 3/27/2012    GRICELDA (obstructive sleep apnea)       DOES NOT USE A MACHINE    Sinus problem      Syncope and collapse              Past Surgical History:   Procedure Laterality Date    APPENDECTOMY        BLOCK-NERVE Left 5/31/2016     [facility]    CARDIAC CATHETERIZATION        COLONOSCOPY   2011    COLONOSCOPY N/A 5/3/2018     [facility] at [facility]O    COLONOSCOPY N/A 9/10/2015     [facility] at [facility]    CORONARY ARTERY BYPASS GRAFT   4/2007     x 1    CYSTOSCOPY N/A 8/30/2017     [facility] at [facility]    CYSTOSCOPY N/A 11/10/2015     [facility] at [facility] OR    ESOPHAGOGASTRODUODENOSCOPY (EGD) N/A 1/4/2016     Performed by Tra [facility] at [facility] (2ND  FLR)    ESOPHAGOGASTRODUODENOSCOPY (EGD) N/A 10/15/2014     Performed by [facility] at [facility] ENDO    INJECTION-STEROID-EPIDURAL-TRANSFORAMINAL Left 2016     [facility]    JOINT REPLACEMENT         left knee total replacement  X 3    mid leftt finger         from a cactuss    MOLE REMOVAL       RADIOFREQUENCY THERMOCOAGULATION (RFTC)-NERVE-MEDIAN BRANCH-LUMBAR Left 2016     [facility] at [facility]    rotative cuff         no rotative cuffs on bilat shoulders has pins     SHOULDER SURGERY         shoulder surgery bilat  RIGHT X 4; LEFT X 3    TRANSRECTAL ULTRASOUND GUIDED PROSTATE BIOPSY Bilateral 11/10/2015     [facility] at [facility] OR    ULTRASOUND-ENDOSCOPIC-UPPER N/A 2017     [facility] at [facility] (2ND FLR)    ULTRASOUND-ENDOSCOPIC-UPPER N/A 2016     Performed by Tra [facility] at [facility] END[facility] (2ND FLR)    ULTRASOUND-ENDOSCOPIC-UPPER N/A 2015     Performed by Jason Saleem MD at [facility] ENDO (2ND FLR)      Social History           Tobacco Use    Smoking status: Never Smoker    Smokeless tobacco: Never Used   Substance Use Topics    Alcohol use: No    Drug use: No            Family History   Problem Relation Age of Onset    Heart disease Mother      Sudden death Father      Cancer Father           advanced lung ca- found after 2 story fall    Stroke Sister      Pneumonia Sister            from PNA    Heart disease Sister      Hypertension Brother      Kidney cancer Neg Hx      Prostate cancer Neg Hx      Urolithiasis Neg Hx      Allergic rhinitis Neg Hx      Allergies Neg Hx      Angioedema Neg Hx      Asthma Neg Hx      Atopy Neg Hx      Eczema Neg Hx      Immunodeficiency Neg Hx      Rhinitis Neg Hx      Urticaria Neg Hx              Review of patient's allergies indicates:   Allergen Reactions    Ace inhibitors Other (See Comments)       Cough     Arb-angiotensin receptor antagonist Itching    Eplerenone Other (See Comments)       Marked bradycardia, 40, tiredness and weakness       Sulfa (sulfonamide antibiotics) Itching       Patient says this was 10 years ago and doesn't remember what happened         Performance Status:Performance Status:The patient's activity level is housebound activities.     Review of Systems:  a review of eleven systems covering constitutional, Psych, Eye, HEENT, Respiratory, Cardiac, GI, , Musculoskeletal, Endocrine, Dermatologicwas negative except the above mentioned abnormalities and for any pertinent findings as listed below: pertinent positive as above, rest is good           Exam:Comprehensive exam done. /60 (BP Location: Right arm, Patient Position: Sitting)   Pulse 79   Temp 96.8 °F (36 °C) (Tympanic)   Resp 18   Wt 108.1 kg (238 lb 5.1 oz)   SpO2 99%   BMI 31.44 kg/m²   Exam included Vitals as listed, and patient's appearance and affect and alertness and mood, oral exam for yeast and hygiene and pharynx lesions and Mallapatti (M) score, neck with inspection for jvd and masses and thyroid abnormalities and lymph nodes (supraclavicular and infraclavicular nodes also examined and noted if abn), chest exam included symmetry and effort and fremitus and percussion and auscultation, cardiac exam included rhythm and gallops and murmur and rubs and jvd and edema, abdominal exam for mass and hepatosplenomegaly and tenderness and hernias and bowel sounds, Musculoskeletal exam with muscle tone and posture and mobility/gait and  strenght, and skin for rashes and cyanosis and pallor and turgor, extremity for clubbing.  Findings were normal except as listed below:  M3,chest is symmetric, no distress, normal percussion, normal fremitus and mild bilat rhonchi/wheezes  No edema, no jvd     Radiographs reviewed: view by direct vision - min infiltrate left lower lung       Results for orders placed during the hospital  encounter of 11/05/18   X-Ray Chest 1 View     Narrative EXAMINATION:  XR CHEST 1 VIEW     CLINICAL HISTORY:  cough;     TECHNIQUE:  Single frontal view of the chest was performed.     COMPARISON:  11/5/2018     FINDINGS:  The cardiomediastinal silhouette is stable..  There are median sternotomy sutures.  Thoracic aorta is tortuous     The left lower lobe infiltrate with best seen on the lateral view on the prior exam and follow-up to include lateral view is suggested.  As visualized on the frontal view it appears decreased.     Right lung remains clear no effusion.        Impression Decrease of the left basilar infiltrate compared to the prior exam.  Suggest follow-up study to include lateral view as it is best seen on the lateral view.        Electronically signed by:       Mayuri Castro MD  Date:                                                11/08/2018  Time:                                                14:19   echo 11/7/18  Conclusion      · The left ventricle cavity is mildly dilated.  · Mild-to-moderate aortic regurgitation.  · Left ventricle ejection fraction is low normal at 51%  · Left ventricle shows mild concentric hypertrophy.  · RV systolic function is normal.  · Left atrium is moderately dilated.  · Moderate aortic valve stenosis.  · Aortic valve area is 1.30 cm2; peak velocity is 2.96 m/s; mean gradient is 21.48 mmHg.  · Right atrium is mildly dilated.  · Mild mitral regurgitation.  · Normal central venous pressure (3 mm Hg).  · The estimated PA systolic pressure is 27.21 mm Hg  · No definite change from Echo in 6/2018         Labs     No results for input(s): WBC, HGB, HCT, PLT, BAND, METAMYELOCYT, MYELOPCT, HGBA1C in the last 24 hours.      Recent Labs   Lab 03/11/19  1109      K 4.1      CO2 23   BUN 98*   CREATININE 5.8*   *   CALCIUM 7.6*   MG 2.0   PHOS 4.4   AST 49*   *   ALKPHOS 95   BILITOT 0.5   PROT 5.9*   ALBUMIN 3.5   No results for input(s): PH, PCO2, PO2,  HCO3 in the last 24 hours.           Microbiology Results (last 7 days)      Procedure Component Value Units Date/Time     Culture, Respiratory [[id number]]       Order Status:  No result Specimen:  Respiratory from Sputum, Expectorated               Impression:         Active Hospital Problems     Diagnosis   POA    Asthma exacerbation [J45.901]   Yes    Pneumonia of left lower lobe due to infectious organism [J18.1]   Yes    Immune deficiency disorder [D84.9]   Yes    Aortic calcification [I70.0]   Yes       Defer to primary control of cholesterol and bp.               Resolved Hospital Problems   No resolved problems to display.                     Plan: pt seems better already. Needs to have f/u immune deficiency.  Asthma control is vague.  Check procalcitonin.  Steroids,abx, bd, needs ig eval and humoral antibody titers outpt.

## 2019-03-14 NOTE — NURSING
Pt remained free of falls, injuries, or trauma during shift. Fall precautions maintained throughout shift. Bed kept in lowest position, locked. Call light within reach. Fall risk band on/allergy band on. Pt showed no new s/s of skin breakdown during shift. No sign of bleeding throughout shift. Extra dose of coumadin this evening per Dr. Laura. Pt repositions independently, up ad shashi. Pt rounded on hourly with a purposful manner. No acute events throughout the shift. VS and assessment performed per orders. Patient progressing towards goals as tolerated. Iv antibiotics administered per orders. Plan of care reviewed with pt, all concerns addressed. Will continue to monitor. Educated for warfarin diet. Fluid restriction in place and tolerated well.

## 2019-03-14 NOTE — NURSING
Patient PTT came back 30.5 @ 0622. Gave bolus according to heparin nomogram Log. Increased rate by 3 units/kg/hr according to heparin nomogram as well.

## 2019-03-14 NOTE — NURSING
Pt no longer discharging til am stated he was sob and dizzy vs BP elevated prn medication given tolerated well. Started on Lovenox and coumadin therapy explained medication he verbalized understanding bleeding precautions maintained. Cardiac monitor reapplied. Will cont to monitor.

## 2019-03-15 VITALS
HEART RATE: 92 BPM | DIASTOLIC BLOOD PRESSURE: 93 MMHG | BODY MASS INDEX: 31.54 KG/M2 | HEIGHT: 73 IN | TEMPERATURE: 98 F | RESPIRATION RATE: 18 BRPM | WEIGHT: 238 LBS | OXYGEN SATURATION: 100 % | SYSTOLIC BLOOD PRESSURE: 175 MMHG

## 2019-03-15 LAB
ALBUMIN SERPL BCP-MCNC: 3.2 G/DL
ALP SERPL-CCNC: 97 U/L
ALT SERPL W/O P-5'-P-CCNC: 117 U/L
ANION GAP SERPL CALC-SCNC: 12 MMOL/L
AST SERPL-CCNC: 25 U/L
BILIRUB SERPL-MCNC: 0.4 MG/DL
BUN SERPL-MCNC: 100 MG/DL
CALCIUM SERPL-MCNC: 7.5 MG/DL
CHLORIDE SERPL-SCNC: 100 MMOL/L
CO2 SERPL-SCNC: 23 MMOL/L
CREAT SERPL-MCNC: 5.1 MG/DL
EST. GFR  (AFRICAN AMERICAN): 11 ML/MIN/1.73 M^2
EST. GFR  (NON AFRICAN AMERICAN): 10 ML/MIN/1.73 M^2
GLUCOSE SERPL-MCNC: 271 MG/DL
INR PPP: 1.8
MAGNESIUM SERPL-MCNC: 2.4 MG/DL
PHOSPHATE SERPL-MCNC: 6.1 MG/DL
POCT GLUCOSE: 267 MG/DL (ref 70–110)
POCT GLUCOSE: >500 MG/DL (ref 70–110)
POCT GLUCOSE: >500 MG/DL (ref 70–110)
POTASSIUM SERPL-SCNC: 4.9 MMOL/L
PROT SERPL-MCNC: 5.5 G/DL
PROTHROMBIN TIME: 18.8 SEC
SODIUM SERPL-SCNC: 135 MMOL/L

## 2019-03-15 PROCEDURE — 36415 COLL VENOUS BLD VENIPUNCTURE: CPT

## 2019-03-15 PROCEDURE — 25000242 PHARM REV CODE 250 ALT 637 W/ HCPCS: Performed by: NURSE PRACTITIONER

## 2019-03-15 PROCEDURE — 25000003 PHARM REV CODE 250: Performed by: INTERNAL MEDICINE

## 2019-03-15 PROCEDURE — 99900035 HC TECH TIME PER 15 MIN (STAT)

## 2019-03-15 PROCEDURE — 94640 AIRWAY INHALATION TREATMENT: CPT

## 2019-03-15 PROCEDURE — 84100 ASSAY OF PHOSPHORUS: CPT

## 2019-03-15 PROCEDURE — 80053 COMPREHEN METABOLIC PANEL: CPT

## 2019-03-15 PROCEDURE — 25000003 PHARM REV CODE 250: Performed by: NURSE PRACTITIONER

## 2019-03-15 PROCEDURE — 85610 PROTHROMBIN TIME: CPT

## 2019-03-15 PROCEDURE — 83735 ASSAY OF MAGNESIUM: CPT

## 2019-03-15 PROCEDURE — 94761 N-INVAS EAR/PLS OXIMETRY MLT: CPT

## 2019-03-15 RX ORDER — WARFARIN 7.5 MG/1
7.5 TABLET ORAL DAILY
Qty: 30 TABLET | Refills: 1 | Status: ON HOLD | OUTPATIENT
Start: 2019-03-15 | End: 2019-04-08 | Stop reason: HOSPADM

## 2019-03-15 RX ADMIN — TORSEMIDE 20 MG: 20 TABLET ORAL at 08:03

## 2019-03-15 RX ADMIN — ATORVASTATIN CALCIUM 80 MG: 40 TABLET, FILM COATED ORAL at 08:03

## 2019-03-15 RX ADMIN — TAMSULOSIN HYDROCHLORIDE 0.4 MG: 0.4 CAPSULE ORAL at 08:03

## 2019-03-15 RX ADMIN — IPRATROPIUM BROMIDE AND ALBUTEROL SULFATE 3 ML: .5; 3 SOLUTION RESPIRATORY (INHALATION) at 12:03

## 2019-03-15 RX ADMIN — CARVEDILOL 6.25 MG: 6.25 TABLET, FILM COATED ORAL at 08:03

## 2019-03-15 RX ADMIN — PANTOPRAZOLE SODIUM 40 MG: 40 TABLET, DELAYED RELEASE ORAL at 08:03

## 2019-03-15 RX ADMIN — ASPIRIN 81 MG: 81 TABLET, COATED ORAL at 08:03

## 2019-03-15 RX ADMIN — INSULIN ASPART 3 UNITS: 100 INJECTION, SOLUTION INTRAVENOUS; SUBCUTANEOUS at 06:03

## 2019-03-15 RX ADMIN — LOSARTAN POTASSIUM 100 MG: 25 TABLET, FILM COATED ORAL at 08:03

## 2019-03-15 RX ADMIN — AZITHROMYCIN 250 MG: 250 TABLET, FILM COATED ORAL at 08:03

## 2019-03-15 RX ADMIN — ALLOPURINOL 100 MG: 100 TABLET ORAL at 08:03

## 2019-03-15 NOTE — DISCHARGE SUMMARY
[facility] Medicine  Discharge Summary      Patient Name: Micheal Hunt  MRN: [id number]  Admission Date: 3/11/2019  Hospital Length of Stay: 4 days  Discharge Date and Time: 3/15/2019  8:10 AM  Attending Physician: Annita att. providers found   Discharging Provider: Bell Wilkinson NP  Primary Care Provider: Pk Lakhani MD      HPI:   Patient is a 78 y.o. male admitted to Hospitalist Service from Dr. Guillen's office with complaint of persisting asthma exacerbation. Patient reportedly has past medical history significant for bronchial asthma, hypertension, end-stage renal disease (on hemodialysis Monday/Wednesday/Friday), coronary artery disease, benign prostatic hypertrophy, gastroesophageal reflux disease, history of gout hyperlipidemia, obstructive sleep apnea (not on any CPAP).  Reportedly patient has been having lingering symptoms of shortness of breath, wheezing.  Lately patient is getting increasingly weak and dizzy.  Cough is persisting which is mostly nonproductive.  Patient is compliant with his routine hemodialysis (on Monday, Wednesday, Friday).  Patient has recently completed 1st azithromycin than oral Levaquin antibiotic therapy.  Presently taking oral Cipro.  Patient has received intramuscular steroid shot and prednisone taper course.  Patient is requiring increasing use/frequency of nebulizers and inhalers despite no significant improvement.  The patient denies any sick contact or recent travel history.  Patient traveled to [place] in the month of February.  Expect duration is mostly clear. Patient denied chest pain, abdominal pain, nausea, vomiting, headache, vision changes, focal neuro-deficits, cough or fever.  Patient denied any orthopnea or chest pain. No calf tenderness reported.  Patient also following with Dr. Aguilera from Allergy/immunology Department.  Patient was hospitalized in November 2018 for pneumonia evaluation and management.        * No surgery  found *      Hospital Course:   Patient monitor closely during hospitalization.  He was placed on a cardiac monitor and received scheduled respiratory treatments, IV Solu-Medrol and oral antibiotics.  Pulmonary consulted.  He was recommended for outpatient immunology evaluation.  Nephrology consulted for end-stage renal disease management.  He did undergo hemodialysis during admission with volume removal all per Nephrology orders.  Patient was noted to have an an episode of nonsustained V-tach and atrial fibrillation.  No previous history of AFib per patient.  He was initiated on a heparin drip for warfarin bridge.  INR trended daily.  Echocardiogram obtained see results below.  Patient reported dizziness which he experiences often for several years.  He received his home dose of meclizine which improved his dizziness.  PT-OT consulted.  Patient is doing well this morning without dizziness.  He was evaluated for home oxygen and did not qualify.  He is stable for discharge from pulmonology and Cardiology standpoint.  He will have outpatient hemodialysis this morning on his routine schedule.    Physical exam  Chest clear to auscultation decreased at bases  Heart irregular irregular     Echo  · Mild left ventricular enlargement. Normal left ventricular systolic function. The estimated ejection fraction is 55%  · Atrial fibrillation observed with restrictive filling pattern present.  · Severe right ventricular enlargement. Normal right ventricular systolic function.  · Severe left atrial enlargement.  · Severe right atrial enlargement.  · Mild aortic valve stenosis (although calculated PEARL within normal range; mean gradient 17mmHg, peak velocity 2.9m/s). Mild aortic regurgitation.  · Mild mitral regurgitation.  · Mild tricuspid regurgitation.  · Pulmonary hypertension present. The estimated PA systolic pressure is 50 mm Hg  · The estimated PA systolic pressure is 50 mm Hg         X-Ray Chest 1 View [[id number]] Resulted:  03/14/19 1037   Order Status: Completed Updated: 03/14/19 1040   Narrative:     EXAMINATION:  XR CHEST 1 VIEW    CLINICAL HISTORY:  post HD;    TECHNIQUE:  Single frontal view of the chest was performed.    COMPARISON:  3/11/2019    FINDINGS:  The heart is enlarged.  The cardiomediastinal silhouette is stable.  There is no confluent infiltrate.  There is no pleural effusion.  There are median sternotomy sutures   Impression:       Cardiomegaly.  No acute cardiopulmonary disease      Electronically signed by: Mayuri Castro MD  Date: 03/14/2019  Time: 10:37           Consults:   Consults (From admission, onward)        Status Ordering Provider     Inpatient consult to Cardiology  Once     Provider:  Shelley Laura MD    Completed STEFANIE STONER     Inpatient consult to Nephrology  Once     Provider:  Frankie Rojo MD    Completed CURTIS SAUCEDO     Inpatient consult to Pulmonology  Once     Provider:  Mihir Guillen MD    Completed CURTIS SAUCEDO     Inpatient consult to Registered Dietitian/Nutritionist  Once     Provider:  (Not yet assigned)    Completed STEFANIE STONER          No new Assessment & Plan notes have been filed under this hospital service since the last note was generated.  Service: Hospital Medicine    Final Active Diagnoses:    Diagnosis Date Noted POA    PRINCIPAL PROBLEM:  Severe persistent asthma with exacerbation [J45.51] 03/11/2019 Yes    Atrial fibrillation [I48.91] 03/12/2019 Yes    ESRD (end stage renal disease) [N18.6] 03/12/2019 Yes    NSVT (nonsustained ventricular tachycardia) [I47.2] 03/12/2019 No    Pneumonia of left lower lobe due to infectious organism [J18.1] 03/11/2019 Yes    Immune deficiency disorder [D84.9] 03/11/2019 Yes    Aortic calcification [I70.0] 03/11/2019 Yes    Anemia due to chronic kidney disease, on chronic dialysis [N18.6, D63.1, Z99.2] 02/25/2018 Not Applicable    CAD (coronary artery disease), 2V CABG 2007 [I25.10]  Yes      Problems Resolved During  this Admission:       Discharged Condition: stable    [place] [facility]    Follow Up:  Follow-up Information     Shelley Laura MD In 2 weeks.    Specialties:  Cardiovascular Disease, Cardiology  Contact information:  [address]  [address]  [facility]  [place] [address] [phone]             Mihir Guillen MD In 2 weeks.    Specialty:  Pulmonary Disease  Contact information:  [address]  [address]  [place] [address]  [phone]             CLEO Goins On 3/22/2019.    Specialty:  Family Medicine  Why:  @2:40pm   Contact information:  [address]  [place] [address]  [phone]             Mihir Guillen MD.    Specialty:  Pulmonary Disease  Contact information:  [address]  [address]  [place] [address]  [phone]             Diane Lee MD.    Specialties:  Gastroenterology, Internal Medicine  Why:  dysphagia   Contact information:  [address]  [address]  [place] [address]  [phone]                 Patient Instructions:      Humoral Immune Eval (Pneumo Serotypes) With H. Flu   Standing Status: Future Standing Exp. Date: 05/12/20     Ambulatory Referral to Anticoagulation Monitoring (Renewal)   Referral Priority: Routine Referral Type: Consultation   Referral Reason: Specialty Services Required   Requested Specialty: Cardiology   Number of Visits Requested: 1     Ambulatory consult to Sleep Disorders   Referral Priority: Routine Referral Type: Consultation   Requested Specialty: Sleep Medicine   Number of Visits Requested: 1     Notify your health care provider if you experience any of the following:  persistent nausea and vomiting or diarrhea     Notify your health care provider if you experience any of the following:  temperature >100.4     Notify your health care provider if you experience any of the following:  severe uncontrolled pain     Notify your health care provider if you experience any of the following:  redness, tenderness, or  signs of infection (pain, swelling, redness, odor or green/yellow discharge around incision site)     Notify your health care provider if you experience any of the following:  difficulty breathing or increased cough     Notify your health care provider if you experience any of the following:  severe persistent headache     Notify your health care provider if you experience any of the following:  worsening rash     Notify your health care provider if you experience any of the following:  persistent dizziness, light-headedness, or visual disturbances     SUBSEQUENT HOME HEALTH ORDERS   Order Comments: Subsequent Home Health Orders    Current Medications:  Current Facility-Administered Medications:  acetaminophen tablet 650 mg, 650 mg, Oral, Q6H PRN, Charissa Olivas MD, 650 mg at 03/12/19 1703  albuterol-ipratropium 2.5 mg-0.5 mg/3 mL nebulizer solution 3 mL, 3 mL, Nebulization, Q6H, Bell Wilkinson NP, 3 mL at 03/14/19 0712  allopurinol tablet 100 mg, 100 mg, Oral, Daily, Eryn Llanes NP, 100 mg at 03/14/19 0803  aspirin EC tablet 81 mg, 81 mg, Oral, Daily, Eryn Llanes NP, 81 mg at 03/14/19 0803  atorvastatin tablet 80 mg, 80 mg, Oral, Daily, Eryn Llanes NP, 80 mg at 03/14/19 0803  azithromycin tablet 250 mg, 250 mg, Oral, Daily, Bell Wilkinson NP, 250 mg at 03/14/19 1227  carvedilol tablet 6.25 mg, 6.25 mg, Oral, BID, Charissa Olivas MD, 6.25 mg at 03/14/19 0803  dextrose 50% injection 12.5 g, 12.5 g, Intravenous, PRN, Charissa Olivas MD  dextrose 50% injection 25 g, 25 g, Intravenous, PRN, Charissa Olivas MD  finasteride tablet 5 mg, 5 mg, Oral, Daily, Eryn Llanes NP, 5 mg at 03/14/19 0802  fluticasone-vilanterol 100-25 mcg/dose diskus inhaler 1 puff, 1 puff, Inhalation, Daily, Bell Wilkinson NP, 1 puff at 03/14/19 0721  gabapentin capsule 300 mg, 300 mg, Oral, QHS, Eryn Llanes, NP, 300 mg at 03/13/19 2226  glucagon (human recombinant) injection 1 mg, 1 mg, Intramuscular, PRN, Aqib  MD Brendon  glucose chewable tablet 16 g, 16 g, Oral, PRN, Charissa Olivas MD  glucose chewable tablet 24 g, 24 g, Oral, PRN, Charissa Olivas MD  hydrALAZINE injection 10 mg, 10 mg, Intravenous, Q6H PRN, Eryn Llanes NP, 10 mg at 03/12/19 0506  HYDROcodone-acetaminophen 5-325 mg per tablet 1 tablet, 1 tablet, Oral, Q6H PRN, Charissa Olivas MD, 1 tablet at 03/12/19 0551  insulin aspart U-100 pen 0-5 Units, 0-5 Units, Subcutaneous, QID (AC + HS) PRN, Bell Wilkinson NP, 5 Units at 03/14/19 1228  insulin detemir U-100 pen 10 Units, 10 Units, Subcutaneous, QHS, Bell Wilkinson NP, 10 Units at 03/13/19 2235  isosorbide mononitrate tablet 10 mg, 10 mg, Oral, QHS, Eryn Llanes NP, 10 mg at 03/13/19 2226  losartan tablet 100 mg, 100 mg, Oral, Daily, Charissa Olivas MD, 100 mg at 03/14/19 0803  magnesium oxide tablet 800 mg, 800 mg, Oral, PRN, Charissa Olivas MD  magnesium oxide tablet 800 mg, 800 mg, Oral, PRN, Charissa Olivas MD  methylPREDNISolone sodium succinate injection 62.5 mg, 62.5 mg, Intravenous, Q8H, Charissa Olivas MD, 62.5 mg at 03/14/19 0538  mirtazapine tablet 7.5 mg, 7.5 mg, Oral, QHS, Bell Wilkinson NP, 7.5 mg at 03/13/19 2226  ondansetron injection 4 mg, 4 mg, Intravenous, Q8H PRN, Charissa Olivas MD  pantoprazole EC tablet 40 mg, 40 mg, Oral, Daily, Charissa Olivas MD, 40 mg at 03/14/19 0803  polyethylene glycol packet 17 g, 17 g, Oral, Daily PRN, Charissa Olivas MD, 17 g at 03/13/19 1134  potassium chloride 10% oral solution 40 mEq, 40 mEq, Oral, PRN, Aqib Victoria, MD  potassium chloride 10% oral solution 40 mEq, 40 mEq, Oral, PRN, Aqib Victoria, MD  potassium, sodium phosphates 280-160-250 mg packet 2 packet, 2 packet, Oral, PRN, Aqib Victoria, MD  potassium, sodium phosphates 280-160-250 mg packet 2 packet, 2 packet, Oral, PRN, Aqib Victoria, MD  potassium, sodium phosphates 280-160-250 mg packet 2 packet, 2 packet, Oral, PRN, Aqib Victoria, MD  sodium chloride 0.9% flush 5 mL, 5 mL, Intravenous, PRN, Aqib Victoria,  MD  tamsulosin 24 hr capsule 0.4 mg, 0.4 mg, Oral, Daily, Charissa Olivas MD, 0.4 mg at 03/14/19 0803  torsemide tablet 20 mg, 20 mg, Oral, BID, Shelley Laura MD, 20 mg at 03/14/19 0803  traMADol tablet 50 mg, 50 mg, Oral, Q6H PRN, Charissa Olivas MD, 50 mg at 03/12/19 0801  warfarin (COUMADIN) tablet 5 mg, 5 mg, Oral, Daily, Shelley Laura MD, 5 mg at 03/13/19 1739  zolpidem tablet 5 mg, 5 mg, Oral, Nightly PRN, Charissa Olivas MD        Nursing:   Heart Failure:      SN to instruct on the following:    Instruct on the definition of CHF.   Instruct on the signs/sympoms of CHF to be reported.   Instruct on and monitor daily weights.   Instruct on factors that cause exacerbation.   Instruct on action, dose, schedule, and side effects of medications.   Instruct on diet as prescribed.   Instruct on activity allowed.   Instruct on life-style modifications for life long management of CHF   SN to assess compliance with daily weights, diet, medications, fluid retention,     safety precautions, activities permitted and life-style modifications.   Additional 1-2 SN visits per week as needed for signs and symptoms     of CHF exacerbation.      For Weight Gain > 2-3 lbs in 1 day or 4-6 lbs over 1 week notify PCP:  Increase in near Demadex 20 mg daily (oral diuretic) dose to twice daily for 3 days temporarily  Obtain BMP lab test in 3 days    Diet:   cardiac diet    Activities:   activity as tolerated    Labs:  na    Referrals/ Consults  Physical Therapy to evaluate and treat. Evaluate for home safety and equipment needs; Establish/upgrade home exercise program. Perform / instruct on therapeutic exercises, gait training, transfer training, and Range of Motion.  Occupational Therapy to evaluate and treat. Evaluate home environment for safety and equipment needs. Perform/Instruct on transfers, ADL training, ROM, and therapeutic exercises.    Home Health Aide:  Nursing Three times weekly, Physical Therapy Three times weekly and Occupational  Therapy Three times weekly     Order Specific Question Answer Comments   What Home Health Agency is the patient currently using? Other/External      Activity as tolerated       Significant Diagnostic Studies: Labs:   BMP:   Recent Labs   Lab 03/14/19  0622 03/14/19  2221 03/15/19  0617   * 538* 271*   *  --  135*   K 4.9  --  4.9   CL 98  --  100   CO2 25  --  23   BUN 79*  --  100*   CREATININE 4.4*  --  5.1*   CALCIUM 7.7*  --  7.5*   MG 2.4  --  2.4    and CMP   Recent Labs   Lab 03/14/19  0622 03/14/19  2221 03/15/19  0617   *  --  135*   K 4.9  --  4.9   CL 98  --  100   CO2 25  --  23   * 538* 271*   BUN 79*  --  100*   CREATININE 4.4*  --  5.1*   CALCIUM 7.7*  --  7.5*   PROT 5.5*  --  5.5*   ALBUMIN 3.2*  --  3.2*   BILITOT 0.4  --  0.4   ALKPHOS 96  --  97   AST 18  --  25   *  --  117*   ANIONGAP 11  --  12   ESTGFRAFRICA 14*  --  11*   EGFRNONAA 12*  --  10*       Pending Diagnostic Studies:     Procedure Component Value Units Date/Time    X-Ray Chest AP Portable [[id number]]     Order Status:  Sent Lab Status:  No result          Medications:  Reconciled Home Medications:      Medication List      START taking these medications    warfarin 7.5 MG tablet  Commonly known as:  COUMADIN  Take 1 tablet (7.5 mg total) by mouth Daily.        CHANGE how you take these medications    azithromycin 250 MG tablet  Commonly known as:  Z-JEROD  Take 2 tablets (500 mg total) by mouth once daily. for 5 days  What changed:  medication strength     * meclizine 25 mg tablet  Commonly known as:  ANTIVERT  TAKE 1 TABLET BY MOUTH THREE TIMES A DAY AS NEEDED  What changed:  Another medication with the same name was added. Make sure you understand how and when to take each.     * meclizine 25 mg tablet  Commonly known as:  ANTIVERT  Take 1 tablet (25 mg total) by mouth 3 (three) times daily as needed for Dizziness.  What changed:  You were already taking a medication with the same name, and this  prescription was added. Make sure you understand how and when to take each.     predniSONE 20 MG tablet  Commonly known as:  DELTASONE  3 pills for 3 days, 2 pills for 3 days, 1 pill for 3 days, repeat if needed.  What changed:  Another medication with the same name was removed. Continue taking this medication, and follow the directions you see here.     torsemide 20 MG Tab  Commonly known as:  DEMADEX  Take 1 tablet (20 mg total) by mouth 2 (two) times daily.  What changed:  when to take this         * This list has 2 medication(s) that are the same as other medications prescribed for you. Read the directions carefully, and ask your doctor or other care provider to review them with you.            CONTINUE taking these medications    albuterol 90 mcg/actuation inhaler  Commonly known as:  PROVENTIL/VENTOLIN HFA  2 puffs every 4 hours as needed for cough, wheeze, or shortness of breath     albuterol-ipratropium 2.5 mg-0.5 mg/3 mL nebulizer solution  Commonly known as:  DUO-NEB  Take 3 mLs by nebulization every 6 (six) hours as needed for Wheezing or Shortness of Breath.     allopurinol 100 MG tablet  Commonly known as:  ZYLOPRIM  TAKE 1 TABLET BY MOUTH EVERY DAY     aspirin 81 MG EC tablet  Commonly known as:  ECOTRIN  Take 81 mg by mouth once daily.     atorvastatin 80 MG tablet  Commonly known as:  LIPITOR  TAKE 1 TABLET (80 MG TOTAL) BY MOUTH ONCE DAILY.     budesonide-formoterol 160-4.5 mcg 160-4.5 mcg/actuation Hfaa  Commonly known as:  SYMBICORT  Inhale 2 puffs into the lungs every 12 (twelve) hours. Controller     carvedilol 6.25 MG tablet  Commonly known as:  COREG  Take 1 tablet (6.25 mg total) by mouth 2 (two) times daily with meals.     celecoxib 200 MG capsule  Commonly known as:  CeleBREX  Take 1 capsule (200 mg total) by mouth once daily.     finasteride 5 mg tablet  Commonly known as:  PROSCAR  TAKE 1 TABLET ONCE DAILY.     fluticasone 50 mcg/actuation nasal spray  Commonly known as:  FLONASE  2  sprays (100 mcg total) by Each Nare route once daily.     gabapentin 300 MG capsule  Commonly known as:  NEURONTIN  Take 1 capsule (300 mg total) by mouth every evening.     isosorbide mononitrate 10 mg tablet  Commonly known as:  ISMO,MONOKET  TAKE 1 TABLET BY MOUTH EVERY DAY IN THE EVENING     losartan 100 MG tablet  Commonly known as:  COZAAR  TAKE 1 TABLET BY MOUTH EVERY DAY     mirtazapine 7.5 MG Tab  Commonly known as:  REMERON  TAKE 1 TABLET BY MOUTH EVERY EVENING.     NITROSTAT 0.4 MG SL tablet  Generic drug:  nitroGLYCERIN  PLACE 1 TABLET (0.4 MG TOTAL) UNDER THE TONGUE EVERY 5 (FIVE) MINUTES AS NEEDED FOR CHEST PAIN.     omeprazole 20 MG capsule  Commonly known as:  PRILOSEC  TAKE 1 CAPSULE BY MOUTH EVERY DAY     sodium chloride 3 % Mist  Commonly known as:  SALINE NASAL MIST  1 spray by Nasal route 2 (two) times daily.     tamsulosin 0.4 mg Cap  Commonly known as:  FLOMAX  TAKE 1 CAPSULE BY MOUTH EVERY DAY     tiotropium bromide 1.25 mcg/actuation Mist  Commonly known as:  SPIRIVA RESPIMAT  Inhale 2.5 mcg into the lungs once daily. Controller     * zolpidem 5 MG Tab  Commonly known as:  AMBIEN  TAKE 1 TABLET BY MOUTH EVERY EVENING AS NEEDED     * zolpidem 5 MG Tab  Commonly known as:  AMBIEN  TAKE 1 TABLET BY MOUTH EVERY EVENING AS NEEDED         * This list has 2 medication(s) that are the same as other medications prescribed for you. Read the directions carefully, and ask your doctor or other care provider to review them with you.            STOP taking these medications    levoFLOXacin 500 MG tablet  Commonly known as:  LEVAQUIN     traMADol 50 mg tablet  Commonly known as:  ULTRAM            Indwelling Lines/Drains at time of discharge:   Lines/Drains/Airways     Drain                 Hemodialysis AV Fistula Left upper arm -- days                Time spent on the discharge of patient: 35 minutes  Patient was seen and examined on the date of discharge and determined to be suitable for discharge.          Bell Wilkinson NP  Department of Hospital Medicine  [facility]

## 2019-03-15 NOTE — PLAN OF CARE
03/14/19 1921   Patient Assessment/Suction   Level of Consciousness (AVPU) alert   Respiratory Effort Normal;Unlabored   Expansion/Accessory Muscles/Retractions expansion symmetric   All Lung Fields Breath Sounds wheezes, expiratory   Rhythm/Pattern, Respiratory pattern regular   Cough Frequency infrequent   Cough Type good;nonproductive   PRE-TX-O2   O2 Device (Oxygen Therapy) room air   SpO2 97 %   Pulse Oximetry Type Intermittent   Pulse 83   Resp 18   Aerosol Therapy   $ Aerosol Therapy Charges Aerosol Treatment   Respiratory Treatment Status (SVN) given   Treatment Route (SVN) mask   Patient Position (SVN) HOB elevated   Post Treatment Assessment (SVN) breath sounds improved   Signs of Intolerance (SVN) none   Breath Sounds Post-Respiratory Treatment   Post-treatment Heart Rate (beats/min) 84   Post-treatment Resp Rate (breaths/min) 16

## 2019-03-15 NOTE — PLAN OF CARE
03/15/19 0741   Patient Assessment/Suction   Level of Consciousness (AVPU) alert   Respiratory Effort Normal;Unlabored   Rhythm/Pattern, Respiratory pattern regular;depth regular;unlabored   PRE-TX-O2   O2 Device (Oxygen Therapy) room air   SpO2 98 %   Pulse Oximetry Type Intermittent   $ Pulse Oximetry - Multiple Charge Pulse Oximetry - Multiple   Pulse 79   Resp 18   Aerosol Therapy   $ Aerosol Therapy Charges Refused

## 2019-03-15 NOTE — PROGRESS NOTES
[facility] Medicine  Progress Note    Patient Name: Micheal Hunt  MRN: [id number]  Patient Class: IP- Inpatient   Admission Date: 3/11/2019  Length of Stay: 4 days  Attending Physician: No att. providers found  Primary Care Provider: Pk Lakhani MD        Subjective:     Principal Problem:Severe persistent asthma with exacerbation    HPI:  Patient is a 78 y.o. male admitted to Hospitalist Service from Dr. Guillen's office with complaint of persisting asthma exacerbation. Patient reportedly has past medical history significant for bronchial asthma, hypertension, end-stage renal disease (on hemodialysis Monday/Wednesday/Friday), coronary artery disease, benign prostatic hypertrophy, gastroesophageal reflux disease, history of gout hyperlipidemia, obstructive sleep apnea (not on any CPAP).  Reportedly patient has been having lingering symptoms of shortness of breath, wheezing.  Lately patient is getting increasingly weak and dizzy.  Cough is persisting which is mostly nonproductive.  Patient is compliant with his routine hemodialysis (on Monday, Wednesday, Friday).  Patient has recently completed 1st azithromycin than oral Levaquin antibiotic therapy.  Presently taking oral Cipro.  Patient has received intramuscular steroid shot and prednisone taper course.  Patient is requiring increasing use/frequency of nebulizers and inhalers despite no significant improvement.  The patient denies any sick contact or recent travel history.  Patient traveled to [place] in the month of February.  Expect duration is mostly clear. Patient denied chest pain, abdominal pain, nausea, vomiting, headache, vision changes, focal neuro-deficits, cough or fever.  Patient denied any orthopnea or chest pain. No calf tenderness reported.  Patient also following with Dr. Aguilera from Allergy/immunology Department.  Patient was hospitalized in November 2018 for pneumonia evaluation and management.        [facility]  Course:  Patient monitor closely during hospitalization.  He was placed on a cardiac monitor and received scheduled respiratory treatments, IV Solu-Medrol and oral antibiotics.  Pulmonary consulted.  He was recommended for outpatient immunology evaluation.  Nephrology consulted for end-stage renal disease management.  He did undergo hemodialysis during admission with volume removal all per Nephrology orders.  Patient was noted to have an an episode of nonsustained V-tach and atrial fibrillation.  No previous history of AFib per patient.  He was initiated on a heparin drip for warfarin bridge.  INR trended daily.  Echocardiogram obtained see results below.  Patient reported dizziness which he experiences often for several years.  He received his home dose of meclizine which improved his dizziness.  PT-OT consulted.  Patient is doing well this morning without dizziness.  He was evaluated for home oxygen and did not qualify.  He is stable for discharge from pulmonology and Cardiology standpoint.  He will have outpatient hemodialysis this morning on his routine schedule.    Physical exam  Chest clear to auscultation decreased at bases  Heart irregular irregular    Interval History:  No new issues overnight.  Patient reports onset of dizziness this afternoon.  He states it feels like his normal dizziness he experiences home which improved with meclizine.  He feels like he cannot go home this evening and wishes to remain in hospital overnight.  He is stable for discharge from Cardiology and pulmonology standpoint.    Review of Systems   Constitutional: Positive for activity change, appetite change and fatigue. Negative for chills.   HENT: Negative for congestion, hearing loss and sore throat.    Respiratory: Positive for cough. Negative for shortness of breath and wheezing.    Cardiovascular: Positive for leg swelling. Negative for chest pain and palpitations.   Gastrointestinal: Negative for abdominal pain and nausea.    Musculoskeletal: Positive for arthralgias. Negative for back pain and myalgias.   Neurological: Positive for dizziness. Negative for syncope and light-headedness.   Psychiatric/Behavioral: Negative for agitation and confusion. The patient is not nervous/anxious.      Objective:     Vital Signs (Most Recent):  Temp: 97.6 °F (36.4 °C) (03/15/19 0805)  Pulse: 92 (03/15/19 0805)  Resp: 18 (03/15/19 0805)  BP: (!) 175/93 (03/15/19 0805)  SpO2: 100 % (03/15/19 0805) Vital Signs (24h Range):  Temp:  [96.7 °F (35.9 °C)-98.2 °F (36.8 °C)] 97.6 °F (36.4 °C)  Pulse:  [75-96] 92  Resp:  [18-20] 18  SpO2:  [96 %-100 %] 100 %  BP: (115-192)/() 175/93     Weight: 108 kg (238 lb)  Body mass index is 31.4 kg/m².    Intake/Output Summary (Last 24 hours) at 3/15/2019 1056  Last data filed at 3/15/2019 0600  Gross per 24 hour   Intake 780 ml   Output 625 ml   Net 155 ml      Physical Exam   Constitutional: He is oriented to person, place, and time. He appears well-developed and well-nourished.   HENT:   Head: Normocephalic and atraumatic.   Nose: Nose normal.   Mouth/Throat: Oropharynx is clear and moist.   Eyes: Conjunctivae and EOM are normal. Pupils are equal, round, and reactive to light.   Neck: Normal range of motion. Neck supple.   Cardiovascular: Normal rate, regular rhythm and intact distal pulses.   Murmur heard.  Irregular irregular.  Trace pretibial edema   Pulmonary/Chest: Effort normal. He has no wheezes.   Course decreased at the bases   Abdominal: Soft. Bowel sounds are normal. There is no tenderness.   Musculoskeletal: Normal range of motion.   Neurological: He is alert and oriented to person, place, and time. Coordination normal.   Skin: Skin is warm and dry.   Av fistula positive thrill and bruit to left upper extremity   Psychiatric: He has a normal mood and affect. His behavior is normal.   Nursing note and vitals reviewed.      Significant Labs:   BMP:   Recent Labs   Lab 03/15/19  0617   *   NA  135*   K 4.9      CO2 23   *   CREATININE 5.1*   CALCIUM 7.5*   MG 2.4     CBC:   No results for input(s): WBC, HGB, HCT, PLT in the last 48 hours.    Significant Imaging: I have reviewed and interpreted all pertinent imaging results/findings within the past 24 hours.    Assessment/Plan:      * Severe persistent asthma with exacerbation    Pulmonary consulted.  Initiated on scheduled breathing treatments, Flonase and steroids.       NSVT (nonsustained ventricular tachycardia)    Cardiac monitoring.  Consult Cardiology.       ESRD (end stage renal disease)    Consult Nephrology for end-stage renal disease management.  Resume hemodialysis on his routine schedule.       Atrial fibrillation    Current and atrial fibrillation on telemetry.  No recent EKG demonstrates telemetry will consult Cardiology.   New onset atrial fibrillation.  Cardiology initiated heparin drip for warfarin bridge.  Will trend INR.  Consult dietary.       Immune deficiency disorder    Workup for immunodeficiency disorder outpatient per pulmonology       Pneumonia of left lower lobe due to infectious organism    Initiated on IV antibiotics.  Procalcitonin pending  Will deescalate antibiotics if procalcitonin negative.     Anemia due to chronic kidney disease, on chronic dialysis    Hemoglobin-hematocrit stable.  Trend CBC       CAD (coronary artery disease), 2V CABG 2007    Chronic resume home statin and aspirin       Vertigo    Resume home meclizine as needed.         VTE Risk Mitigation (From admission, onward)        Ordered     warfarin tablet 7.5 mg  Daily      03/14/19 2001     enoxaparin injection 110 mg  Every 24 hours (non-standard times)      03/14/19 1708     IP VTE HIGH RISK PATIENT  Once      03/11/19 1059              Bell Wilkinson NP  Department of Hospital Medicine   [facility]

## 2019-03-15 NOTE — PLAN OF CARE
Problem: Adult Inpatient Plan of Care  Goal: Plan of Care Review  Outcome: Ongoing (interventions implemented as appropriate)  AAOx4. VSS. Plan of care reviewed with patient. Safety maintained throughout shift. Bed locked and low, SRx2, call light within reach. BS monitoring revealed >500, PRN and scheduled insulin given, and additional one time dose ordered by on call NP. Patient c/o tightness in chest/lungs, receiving scheduled breathing treatments, no c/o SOB, O2 sat stable, room air. Telemetry monitoring, afib.  Hourly/q2 rounding completed this shift for pt safety. Will continue to monitor.

## 2019-03-15 NOTE — HOSPITAL COURSE
Patient monitor closely during hospitalization.  He was placed on a cardiac monitor and received scheduled respiratory treatments, IV Solu-Medrol and oral antibiotics.  Pulmonary consulted.  He was recommended for outpatient immunology evaluation.  Nephrology consulted for end-stage renal disease management.  He did undergo hemodialysis during admission with volume removal all per Nephrology orders.  Patient was noted to have an an episode of nonsustained V-tach and atrial fibrillation.  No previous history of AFib per patient.  He was initiated on a heparin drip for warfarin bridge.  INR trended daily.  Echocardiogram obtained see results below.  Patient reported dizziness which he experiences often for several years.  He received his home dose of meclizine which improved his dizziness.  PT-OT consulted.  Patient is doing well this morning without dizziness.  He was evaluated for home oxygen and did not qualify.  He is stable for discharge from pulmonology and Cardiology standpoint.  He will have outpatient hemodialysis this morning on his routine schedule.    Physical exam  Chest clear to auscultation decreased at bases  Heart irregular irregular

## 2019-03-15 NOTE — NURSING
"Discussed with patient in detail about his safety, especially with c/o dizziness and SOB earlier. He was educated to call before he gets up and that I would be setting his bed alarm for his safety. Patient very adamant about me not setting his bed alarm. Stated "he doesn't need it, he is fine."   "

## 2019-03-15 NOTE — PROGRESS NOTES
Progress Note  PULMONARY    Admit Date: 3/11/2019   03/15/2019      Pt dced                      .

## 2019-03-15 NOTE — HPI
Patient is a 78 y.o. male admitted to Hospitalist Service from Dr. Guillen's office with complaint of persisting asthma exacerbation. Patient reportedly has past medical history significant for bronchial asthma, hypertension, end-stage renal disease (on hemodialysis Monday/Wednesday/Friday), coronary artery disease, benign prostatic hypertrophy, gastroesophageal reflux disease, history of gout hyperlipidemia, obstructive sleep apnea (not on any CPAP).  Reportedly patient has been having lingering symptoms of shortness of breath, wheezing.  Lately patient is getting increasingly weak and dizzy.  Cough is persisting which is mostly nonproductive.  Patient is compliant with his routine hemodialysis (on Monday, Wednesday, Friday).  Patient has recently completed 1st azithromycin than oral Levaquin antibiotic therapy.  Presently taking oral Cipro.  Patient has received intramuscular steroid shot and prednisone taper course.  Patient is requiring increasing use/frequency of nebulizers and inhalers despite no significant improvement.  The patient denies any sick contact or recent travel history.  Patient traveled to [place] in the month of February.  Expect duration is mostly clear. Patient denied chest pain, abdominal pain, nausea, vomiting, headache, vision changes, focal neuro-deficits, cough or fever.  Patient denied any orthopnea or chest pain. No calf tenderness reported.  Patient also following with Dr. Aguilera from Allergy/immunology Department.  Patient was hospitalized in November 2018 for pneumonia evaluation and management.

## 2019-03-15 NOTE — NURSING
Notified of critical lab value for blood glucose of 538, notified on call NP, patient already treated with PRN and scheduled insulin and additional one time dose ordered by NP given as well. Notified on call NP of critical lab value, no additional orders at this time.

## 2019-03-15 NOTE — NURSING
Situation Principle Problem:  Severe persistent asthma with exacerbation      Reason for Calling: patient blood sugar greater than 500    Provider Calling: Ashlie Irina   Background Vitals:    03/14/19 1825 03/14/19 1830 03/14/19 1921 03/14/19 2026   BP: (!) 171/81 (!) 170/81  (!) 159/84   BP Location:       Patient Position:    Lying   Pulse: 87  83 96   Resp:   18 20   Temp:    96.7 °F (35.9 °C)   TempSrc:    Oral   SpO2:   97% 99%   Weight:       Height:           POCT Glucose   Date Value Ref Range Status   03/14/2019 453 (HH) 70 - 110 mg/dL Final   03/14/2019 463 (HH) 70 - 110 mg/dL Final   03/14/2019 366 (H) 70 - 110 mg/dL Final   03/14/2019 320 (H) 70 - 110 mg/dL Final   03/13/2019 422 (H) 70 - 110 mg/dL Final   03/13/2019 361 (H) 70 - 110 mg/dL Final   03/13/2019 361 (H) 70 - 110 mg/dL Final   03/13/2019 176 (H) 70 - 110 mg/dL Final       Intake/Output:    Intake/Output Summary (Last 24 hours) at 3/14/2019 2210  Last data filed at 3/14/2019 1800  Gross per 24 hour   Intake 1371.36 ml   Output 925 ml   Net 446.36 ml        Assessment What is happening: Patient's fingerstick glucose was >500 twice. I gave 3 units aspart and 15 units levemir according to SSI and scheduled insulin. I also ordered a STAT blood glucose. Any other orders at this time?   Response Provider Response: Additional 12 units of aspart ordered and given.

## 2019-03-15 NOTE — PHYSICIAN QUERY
PT Name: Micheal Hunt  MR #: [id number]     Physician Query Form - Medication-Correlation for Diagnosis      CDS/: Katty Cooley               Contact information: [email or web address]    This form is a permanent document in the medical record.     Query Date: March 15, 2019      By submitting this query, we are merely seeking further clarification of documentation.  Please utilize your independent clinical judgment when addressing the question(s) below.    The medical record contains the following:  The patient has an order for the following medication(s):    Insulin aspart U-100 pen 12 units SQ once    Insulin detemir U-100 pen 15 units SQ nightly    Insulin regular injection 20 units SQ once      BS monitoring revealed >500, PRN and scheduled insulin given, and additional one time dose ordered by on call NP - POC notes 3/15 0538       Please provide the corresponding diagnosis or diagnoses that support(s) the use of the medication(s)   Provider Use Only        Diagnosis (es): _____dM type 2 with hyperglycemia____________________________________    [ [   ] ] Clinically Undetermined

## 2019-03-15 NOTE — NURSING
Discharged instructions reviewed with pt, verbalized understanding. Follow up appointment scheduled. D/c cardiac monitor and PIV with tip intact. Pt left with all his belongings via wheelchair.

## 2019-03-15 NOTE — SUBJECTIVE & OBJECTIVE
Interval History:  No new issues overnight.  Patient reports onset of dizziness this afternoon.  He states it feels like his normal dizziness he experiences home which improved with meclizine.  He feels like he cannot go home this evening and wishes to remain in hospital overnight.  He is stable for discharge from Cardiology and pulmonology standpoint.    Review of Systems   Constitutional: Positive for activity change, appetite change and fatigue. Negative for chills.   HENT: Negative for congestion, hearing loss and sore throat.    Respiratory: Positive for cough. Negative for shortness of breath and wheezing.    Cardiovascular: Positive for leg swelling. Negative for chest pain and palpitations.   Gastrointestinal: Negative for abdominal pain and nausea.   Musculoskeletal: Positive for arthralgias. Negative for back pain and myalgias.   Neurological: Positive for dizziness. Negative for syncope and light-headedness.   Psychiatric/Behavioral: Negative for agitation and confusion. The patient is not nervous/anxious.      Objective:     Vital Signs (Most Recent):  Temp: 97.6 °F (36.4 °C) (03/15/19 0805)  Pulse: 92 (03/15/19 0805)  Resp: 18 (03/15/19 0805)  BP: (!) 175/93 (03/15/19 0805)  SpO2: 100 % (03/15/19 0805) Vital Signs (24h Range):  Temp:  [96.7 °F (35.9 °C)-98.2 °F (36.8 °C)] 97.6 °F (36.4 °C)  Pulse:  [75-96] 92  Resp:  [18-20] 18  SpO2:  [96 %-100 %] 100 %  BP: (115-192)/() 175/93     Weight: 108 kg (238 lb)  Body mass index is 31.4 kg/m².    Intake/Output Summary (Last 24 hours) at 3/15/2019 1056  Last data filed at 3/15/2019 0600  Gross per 24 hour   Intake 780 ml   Output 625 ml   Net 155 ml      Physical Exam   Constitutional: He is oriented to person, place, and time. He appears well-developed and well-nourished.   HENT:   Head: Normocephalic and atraumatic.   Nose: Nose normal.   Mouth/Throat: Oropharynx is clear and moist.   Eyes: Conjunctivae and EOM are normal. Pupils are equal, round, and  reactive to light.   Neck: Normal range of motion. Neck supple.   Cardiovascular: Normal rate, regular rhythm and intact distal pulses.   Murmur heard.  Irregular irregular.  Trace pretibial edema   Pulmonary/Chest: Effort normal. He has no wheezes.   Course decreased at the bases   Abdominal: Soft. Bowel sounds are normal. There is no tenderness.   Musculoskeletal: Normal range of motion.   Neurological: He is alert and oriented to person, place, and time. Coordination normal.   Skin: Skin is warm and dry.   Av fistula positive thrill and bruit to left upper extremity   Psychiatric: He has a normal mood and affect. His behavior is normal.   Nursing note and vitals reviewed.      Significant Labs:   BMP:   Recent Labs   Lab 03/15/19  0617   *   *   K 4.9      CO2 23   *   CREATININE 5.1*   CALCIUM 7.5*   MG 2.4     CBC:   No results for input(s): WBC, HGB, HCT, PLT in the last 48 hours.    Significant Imaging: I have reviewed and interpreted all pertinent imaging results/findings within the past 24 hours.

## 2019-03-18 ENCOUNTER — TELEPHONE (OUTPATIENT)
Dept: PULMONOLOGY | Facility: CLINIC | Age: 79
End: 2019-03-18

## 2019-03-18 ENCOUNTER — NURSE TRIAGE (OUTPATIENT)
Dept: ADMINISTRATIVE | Facility: CLINIC | Age: 79
End: 2019-03-18

## 2019-03-18 ENCOUNTER — HOSPITAL ENCOUNTER (INPATIENT)
Facility: HOSPITAL | Age: 79
LOS: 2 days | Discharge: ANOTHER HOSPITAL | DRG: 202 | End: 2019-03-22
Attending: EMERGENCY MEDICINE | Admitting: HOSPITALIST
Payer: MEDICARE

## 2019-03-18 DIAGNOSIS — J45.31 MILD PERSISTENT ASTHMA WITH ACUTE EXACERBATION: ICD-10-CM

## 2019-03-18 DIAGNOSIS — R06.02 SOB (SHORTNESS OF BREATH): ICD-10-CM

## 2019-03-18 DIAGNOSIS — N18.6 ESRD (END STAGE RENAL DISEASE): Primary | ICD-10-CM

## 2019-03-18 DIAGNOSIS — I50.9 CONGESTIVE HEART FAILURE: ICD-10-CM

## 2019-03-18 DIAGNOSIS — I50.9 ACUTE ON CHRONIC CONGESTIVE HEART FAILURE, UNSPECIFIED HEART FAILURE TYPE: ICD-10-CM

## 2019-03-18 DIAGNOSIS — J45.51 SEVERE PERSISTENT ASTHMA WITH ACUTE EXACERBATION: ICD-10-CM

## 2019-03-18 PROBLEM — E11.22 HYPERTENSION DUE TO END STAGE RENAL DISEASE CAUSED BY TYPE 2 DIABETES MELLITUS, ON DIALYSIS: Status: ACTIVE | Noted: 2019-03-18

## 2019-03-18 PROBLEM — I48.19 PERSISTENT ATRIAL FIBRILLATION: Status: ACTIVE | Noted: 2019-03-12

## 2019-03-18 PROBLEM — E11.9 TYPE 2 DIABETES MELLITUS, WITHOUT LONG-TERM CURRENT USE OF INSULIN: Status: ACTIVE | Noted: 2019-03-18

## 2019-03-18 PROBLEM — Z99.2 HYPERTENSION DUE TO END STAGE RENAL DISEASE CAUSED BY TYPE 2 DIABETES MELLITUS, ON DIALYSIS: Status: ACTIVE | Noted: 2019-03-18

## 2019-03-18 PROBLEM — J18.9 PNEUMONIA OF LEFT LOWER LOBE DUE TO INFECTIOUS ORGANISM: Status: RESOLVED | Noted: 2019-03-11 | Resolved: 2019-03-18

## 2019-03-18 PROBLEM — I12.0 HYPERTENSION DUE TO END STAGE RENAL DISEASE CAUSED BY TYPE 2 DIABETES MELLITUS, ON DIALYSIS: Status: ACTIVE | Noted: 2019-03-18

## 2019-03-18 PROBLEM — J18.9 HCAP (HEALTHCARE-ASSOCIATED PNEUMONIA): Status: RESOLVED | Noted: 2018-11-06 | Resolved: 2019-03-18

## 2019-03-18 PROBLEM — I50.33 ACUTE ON CHRONIC DIASTOLIC CONGESTIVE HEART FAILURE: Status: ACTIVE | Noted: 2019-03-18

## 2019-03-18 PROBLEM — I27.20 PULMONARY HYPERTENSION: Status: ACTIVE | Noted: 2019-03-18

## 2019-03-18 PROBLEM — I50.43 ACUTE ON CHRONIC COMBINED SYSTOLIC AND DIASTOLIC HEART FAILURE: Status: RESOLVED | Noted: 2018-11-07 | Resolved: 2019-03-18

## 2019-03-18 PROBLEM — J18.9 PNEUMONIA DUE TO INFECTIOUS ORGANISM: Status: RESOLVED | Noted: 2018-11-21 | Resolved: 2019-03-18

## 2019-03-18 LAB
ALBUMIN SERPL BCP-MCNC: 3.8 G/DL
ALP SERPL-CCNC: 107 U/L
ALT SERPL W/O P-5'-P-CCNC: 126 U/L
ANION GAP SERPL CALC-SCNC: 10 MMOL/L
ANION GAP SERPL CALC-SCNC: 13 MMOL/L
AST SERPL-CCNC: 39 U/L
BASOPHILS # BLD AUTO: 0 K/UL
BASOPHILS NFR BLD: 0.1 %
BILIRUB SERPL-MCNC: 0.7 MG/DL
BNP SERPL-MCNC: 834 PG/ML
BUN SERPL-MCNC: 65 MG/DL
BUN SERPL-MCNC: 99 MG/DL
CALCIUM SERPL-MCNC: 7.4 MG/DL
CALCIUM SERPL-MCNC: 8.7 MG/DL
CHLORIDE SERPL-SCNC: 99 MMOL/L
CHLORIDE SERPL-SCNC: 99 MMOL/L
CO2 SERPL-SCNC: 21 MMOL/L
CO2 SERPL-SCNC: 28 MMOL/L
CREAT SERPL-MCNC: 4 MG/DL
CREAT SERPL-MCNC: 5.7 MG/DL
DIFFERENTIAL METHOD: ABNORMAL
EOSINOPHIL # BLD AUTO: 0 K/UL
EOSINOPHIL NFR BLD: 0 %
ERYTHROCYTE [DISTWIDTH] IN BLOOD BY AUTOMATED COUNT: 18.5 %
EST. GFR  (AFRICAN AMERICAN): 10 ML/MIN/1.73 M^2
EST. GFR  (AFRICAN AMERICAN): 15 ML/MIN/1.73 M^2
EST. GFR  (NON AFRICAN AMERICAN): 13 ML/MIN/1.73 M^2
EST. GFR  (NON AFRICAN AMERICAN): 9 ML/MIN/1.73 M^2
GLUCOSE SERPL-MCNC: 208 MG/DL
GLUCOSE SERPL-MCNC: 366 MG/DL
HCT VFR BLD AUTO: 32.6 %
HGB BLD-MCNC: 10.7 G/DL
INR PPP: 2
LYMPHOCYTES # BLD AUTO: 0.7 K/UL
LYMPHOCYTES NFR BLD: 7.5 %
MCH RBC QN AUTO: 31.1 PG
MCHC RBC AUTO-ENTMCNC: 32.8 G/DL
MCV RBC AUTO: 95 FL
MONOCYTES # BLD AUTO: 0.4 K/UL
MONOCYTES NFR BLD: 4.1 %
NEUTROPHILS # BLD AUTO: 7.8 K/UL
NEUTROPHILS NFR BLD: 88.3 %
PHOSPHATE SERPL-MCNC: 4.7 MG/DL
PLATELET # BLD AUTO: 106 K/UL
PMV BLD AUTO: 8.1 FL
POCT GLUCOSE: 221 MG/DL (ref 70–110)
POCT GLUCOSE: 339 MG/DL (ref 70–110)
POTASSIUM SERPL-SCNC: 4.6 MMOL/L
POTASSIUM SERPL-SCNC: 4.7 MMOL/L
PROT SERPL-MCNC: 6.7 G/DL
PROTHROMBIN TIME: 20.3 SEC
RBC # BLD AUTO: 3.44 M/UL
SODIUM SERPL-SCNC: 133 MMOL/L
SODIUM SERPL-SCNC: 137 MMOL/L
WBC # BLD AUTO: 8.8 K/UL

## 2019-03-18 PROCEDURE — 63600175 PHARM REV CODE 636 W HCPCS: Performed by: EMERGENCY MEDICINE

## 2019-03-18 PROCEDURE — 27000221 HC OXYGEN, UP TO 24 HOURS

## 2019-03-18 PROCEDURE — 80100016 HC MAINTENANCE HEMODIALYSIS

## 2019-03-18 PROCEDURE — 84100 ASSAY OF PHOSPHORUS: CPT

## 2019-03-18 PROCEDURE — 25000242 PHARM REV CODE 250 ALT 637 W/ HCPCS: Performed by: NURSE PRACTITIONER

## 2019-03-18 PROCEDURE — 25000003 PHARM REV CODE 250: Performed by: NURSE PRACTITIONER

## 2019-03-18 PROCEDURE — 94640 AIRWAY INHALATION TREATMENT: CPT

## 2019-03-18 PROCEDURE — G0378 HOSPITAL OBSERVATION PER HR: HCPCS

## 2019-03-18 PROCEDURE — 63600175 PHARM REV CODE 636 W HCPCS: Performed by: NURSE PRACTITIONER

## 2019-03-18 PROCEDURE — 96374 THER/PROPH/DIAG INJ IV PUSH: CPT

## 2019-03-18 PROCEDURE — 85610 PROTHROMBIN TIME: CPT

## 2019-03-18 PROCEDURE — 93005 ELECTROCARDIOGRAM TRACING: CPT

## 2019-03-18 PROCEDURE — 99215 OFFICE O/P EST HI 40 MIN: CPT | Mod: ,,, | Performed by: INTERNAL MEDICINE

## 2019-03-18 PROCEDURE — 36415 COLL VENOUS BLD VENIPUNCTURE: CPT

## 2019-03-18 PROCEDURE — 25000003 PHARM REV CODE 250: Performed by: HOSPITALIST

## 2019-03-18 PROCEDURE — 99900035 HC TECH TIME PER 15 MIN (STAT)

## 2019-03-18 PROCEDURE — 99215 PR OFFICE/OUTPT VISIT, EST, LEVL V, 40-54 MIN: ICD-10-PCS | Mod: ,,, | Performed by: INTERNAL MEDICINE

## 2019-03-18 PROCEDURE — 94644 CONT INHLJ TX 1ST HOUR: CPT

## 2019-03-18 PROCEDURE — 96372 THER/PROPH/DIAG INJ SC/IM: CPT

## 2019-03-18 PROCEDURE — 99285 EMERGENCY DEPT VISIT HI MDM: CPT | Mod: 25

## 2019-03-18 PROCEDURE — 85025 COMPLETE CBC W/AUTO DIFF WBC: CPT

## 2019-03-18 PROCEDURE — 80053 COMPREHEN METABOLIC PANEL: CPT

## 2019-03-18 PROCEDURE — 83880 ASSAY OF NATRIURETIC PEPTIDE: CPT

## 2019-03-18 PROCEDURE — 25000242 PHARM REV CODE 250 ALT 637 W/ HCPCS: Performed by: EMERGENCY MEDICINE

## 2019-03-18 PROCEDURE — 80048 BASIC METABOLIC PNL TOTAL CA: CPT

## 2019-03-18 RX ORDER — METHYLPREDNISOLONE SOD SUCC 125 MG
125 VIAL (EA) INJECTION
Status: COMPLETED | OUTPATIENT
Start: 2019-03-18 | End: 2019-03-18

## 2019-03-18 RX ORDER — ALLOPURINOL 100 MG/1
100 TABLET ORAL DAILY
Status: DISCONTINUED | OUTPATIENT
Start: 2019-03-18 | End: 2019-03-22 | Stop reason: HOSPADM

## 2019-03-18 RX ORDER — LANOLIN ALCOHOL/MO/W.PET/CERES
800 CREAM (GRAM) TOPICAL
Status: DISCONTINUED | OUTPATIENT
Start: 2019-03-18 | End: 2019-03-22 | Stop reason: HOSPADM

## 2019-03-18 RX ORDER — IPRATROPIUM BROMIDE AND ALBUTEROL SULFATE 2.5; .5 MG/3ML; MG/3ML
3 SOLUTION RESPIRATORY (INHALATION) EVERY 4 HOURS
Status: DISCONTINUED | OUTPATIENT
Start: 2019-03-18 | End: 2019-03-22 | Stop reason: HOSPADM

## 2019-03-18 RX ORDER — ATORVASTATIN CALCIUM 40 MG/1
80 TABLET, FILM COATED ORAL DAILY
Status: DISCONTINUED | OUTPATIENT
Start: 2019-03-18 | End: 2019-03-18 | Stop reason: SDUPTHER

## 2019-03-18 RX ORDER — INSULIN ASPART 100 [IU]/ML
0-5 INJECTION, SOLUTION INTRAVENOUS; SUBCUTANEOUS
Status: DISCONTINUED | OUTPATIENT
Start: 2019-03-18 | End: 2019-03-19

## 2019-03-18 RX ORDER — ATORVASTATIN CALCIUM 40 MG/1
80 TABLET, FILM COATED ORAL DAILY
Status: DISCONTINUED | OUTPATIENT
Start: 2019-03-18 | End: 2019-03-22 | Stop reason: HOSPADM

## 2019-03-18 RX ORDER — ASPIRIN 81 MG/1
81 TABLET ORAL DAILY
Status: DISCONTINUED | OUTPATIENT
Start: 2019-03-18 | End: 2019-03-22 | Stop reason: HOSPADM

## 2019-03-18 RX ORDER — LOSARTAN POTASSIUM 25 MG/1
100 TABLET ORAL NIGHTLY
Status: DISCONTINUED | OUTPATIENT
Start: 2019-03-18 | End: 2019-03-18

## 2019-03-18 RX ORDER — TAMSULOSIN HYDROCHLORIDE 0.4 MG/1
1 CAPSULE ORAL DAILY
Status: DISCONTINUED | OUTPATIENT
Start: 2019-03-18 | End: 2019-03-22 | Stop reason: HOSPADM

## 2019-03-18 RX ORDER — IBUPROFEN 200 MG
16 TABLET ORAL
Status: DISCONTINUED | OUTPATIENT
Start: 2019-03-18 | End: 2019-03-22 | Stop reason: HOSPADM

## 2019-03-18 RX ORDER — SODIUM CHLORIDE 9 MG/ML
INJECTION, SOLUTION INTRAVENOUS ONCE
Status: DISCONTINUED | OUTPATIENT
Start: 2019-03-18 | End: 2019-03-18

## 2019-03-18 RX ORDER — ISOSORBIDE MONONITRATE 10 MG/1
10 TABLET ORAL NIGHTLY
Status: DISCONTINUED | OUTPATIENT
Start: 2019-03-18 | End: 2019-03-22 | Stop reason: HOSPADM

## 2019-03-18 RX ORDER — AZITHROMYCIN 250 MG/1
500 TABLET, FILM COATED ORAL DAILY
Status: DISCONTINUED | OUTPATIENT
Start: 2019-03-18 | End: 2019-03-22 | Stop reason: HOSPADM

## 2019-03-18 RX ORDER — IPRATROPIUM BROMIDE AND ALBUTEROL SULFATE 2.5; .5 MG/3ML; MG/3ML
3 SOLUTION RESPIRATORY (INHALATION) EVERY 6 HOURS
Status: CANCELLED | OUTPATIENT
Start: 2019-03-18

## 2019-03-18 RX ORDER — LOSARTAN POTASSIUM 25 MG/1
100 TABLET ORAL DAILY
Status: DISCONTINUED | OUTPATIENT
Start: 2019-03-18 | End: 2019-03-18

## 2019-03-18 RX ORDER — FINASTERIDE 5 MG/1
5 TABLET, FILM COATED ORAL DAILY
Status: DISCONTINUED | OUTPATIENT
Start: 2019-03-18 | End: 2019-03-22 | Stop reason: HOSPADM

## 2019-03-18 RX ORDER — IPRATROPIUM BROMIDE AND ALBUTEROL SULFATE 2.5; .5 MG/3ML; MG/3ML
3 SOLUTION RESPIRATORY (INHALATION) EVERY 6 HOURS PRN
Status: DISCONTINUED | OUTPATIENT
Start: 2019-03-18 | End: 2019-03-18 | Stop reason: SDUPTHER

## 2019-03-18 RX ORDER — LOSARTAN POTASSIUM 25 MG/1
100 TABLET ORAL NIGHTLY
Status: DISCONTINUED | OUTPATIENT
Start: 2019-03-18 | End: 2019-03-22 | Stop reason: HOSPADM

## 2019-03-18 RX ORDER — CARVEDILOL 6.25 MG/1
6.25 TABLET ORAL 2 TIMES DAILY WITH MEALS
Status: DISCONTINUED | OUTPATIENT
Start: 2019-03-18 | End: 2019-03-20

## 2019-03-18 RX ORDER — POTASSIUM CHLORIDE 20 MEQ/15ML
40 SOLUTION ORAL
Status: DISCONTINUED | OUTPATIENT
Start: 2019-03-18 | End: 2019-03-22 | Stop reason: HOSPADM

## 2019-03-18 RX ORDER — CELECOXIB 100 MG/1
200 CAPSULE ORAL DAILY
Status: DISCONTINUED | OUTPATIENT
Start: 2019-03-18 | End: 2019-03-18

## 2019-03-18 RX ORDER — MIRTAZAPINE 7.5 MG/1
7.5 TABLET, FILM COATED ORAL NIGHTLY
Status: DISCONTINUED | OUTPATIENT
Start: 2019-03-18 | End: 2019-03-22 | Stop reason: HOSPADM

## 2019-03-18 RX ORDER — SODIUM CHLORIDE 9 MG/ML
INJECTION, SOLUTION INTRAVENOUS
Status: DISCONTINUED | OUTPATIENT
Start: 2019-03-18 | End: 2019-03-22 | Stop reason: HOSPADM

## 2019-03-18 RX ORDER — MECLIZINE HYDROCHLORIDE 25 MG/1
25 TABLET ORAL 3 TIMES DAILY PRN
Status: DISCONTINUED | OUTPATIENT
Start: 2019-03-18 | End: 2019-03-22 | Stop reason: HOSPADM

## 2019-03-18 RX ORDER — IPRATROPIUM BROMIDE AND ALBUTEROL SULFATE 2.5; .5 MG/3ML; MG/3ML
3 SOLUTION RESPIRATORY (INHALATION) EVERY 6 HOURS PRN
Status: DISCONTINUED | OUTPATIENT
Start: 2019-03-18 | End: 2019-03-22 | Stop reason: HOSPADM

## 2019-03-18 RX ORDER — PREDNISONE 20 MG/1
40 TABLET ORAL DAILY
Status: DISCONTINUED | OUTPATIENT
Start: 2019-03-18 | End: 2019-03-20

## 2019-03-18 RX ORDER — FLUTICASONE PROPIONATE 50 MCG
2 SPRAY, SUSPENSION (ML) NASAL DAILY
Status: DISCONTINUED | OUTPATIENT
Start: 2019-03-18 | End: 2019-03-22 | Stop reason: HOSPADM

## 2019-03-18 RX ORDER — IBUPROFEN 200 MG
24 TABLET ORAL
Status: DISCONTINUED | OUTPATIENT
Start: 2019-03-18 | End: 2019-03-22 | Stop reason: HOSPADM

## 2019-03-18 RX ORDER — GABAPENTIN 300 MG/1
300 CAPSULE ORAL NIGHTLY
Status: DISCONTINUED | OUTPATIENT
Start: 2019-03-18 | End: 2019-03-22 | Stop reason: HOSPADM

## 2019-03-18 RX ORDER — IPRATROPIUM BROMIDE AND ALBUTEROL SULFATE 2.5; .5 MG/3ML; MG/3ML
9 SOLUTION RESPIRATORY (INHALATION) CONTINUOUS
Status: DISCONTINUED | OUTPATIENT
Start: 2019-03-18 | End: 2019-03-18

## 2019-03-18 RX ORDER — GLUCAGON 1 MG
1 KIT INJECTION
Status: DISCONTINUED | OUTPATIENT
Start: 2019-03-18 | End: 2019-03-22 | Stop reason: HOSPADM

## 2019-03-18 RX ORDER — METHYLPREDNISOLONE SOD SUCC 125 MG
80 VIAL (EA) INJECTION EVERY 8 HOURS
Status: DISCONTINUED | OUTPATIENT
Start: 2019-03-18 | End: 2019-03-20

## 2019-03-18 RX ORDER — TORSEMIDE 20 MG/1
20 TABLET ORAL 2 TIMES DAILY
Status: DISCONTINUED | OUTPATIENT
Start: 2019-03-18 | End: 2019-03-22 | Stop reason: HOSPADM

## 2019-03-18 RX ADMIN — MIRTAZAPINE 7.5 MG: 7.5 TABLET ORAL at 08:03

## 2019-03-18 RX ADMIN — GABAPENTIN 300 MG: 300 CAPSULE ORAL at 08:03

## 2019-03-18 RX ADMIN — IPRATROPIUM BROMIDE AND ALBUTEROL SULFATE 3 ML: .5; 3 SOLUTION RESPIRATORY (INHALATION) at 07:03

## 2019-03-18 RX ADMIN — IPRATROPIUM BROMIDE AND ALBUTEROL SULFATE 9 ML: .5; 3 SOLUTION RESPIRATORY (INHALATION) at 10:03

## 2019-03-18 RX ADMIN — LOSARTAN POTASSIUM 100 MG: 25 TABLET ORAL at 05:03

## 2019-03-18 RX ADMIN — CARVEDILOL 6.25 MG: 6.25 TABLET, FILM COATED ORAL at 05:03

## 2019-03-18 RX ADMIN — INSULIN ASPART 2 UNITS: 100 INJECTION, SOLUTION INTRAVENOUS; SUBCUTANEOUS at 05:03

## 2019-03-18 RX ADMIN — METHYLPREDNISOLONE SODIUM SUCCINATE 125 MG: 125 INJECTION, POWDER, FOR SOLUTION INTRAMUSCULAR; INTRAVENOUS at 09:03

## 2019-03-18 RX ADMIN — ISOSORBIDE MONONITRATE 10 MG: 10 TABLET ORAL at 08:03

## 2019-03-18 RX ADMIN — INSULIN ASPART 3 UNITS: 100 INJECTION, SOLUTION INTRAVENOUS; SUBCUTANEOUS at 08:03

## 2019-03-18 RX ADMIN — ATORVASTATIN CALCIUM 80 MG: 40 TABLET, FILM COATED ORAL at 08:03

## 2019-03-18 RX ADMIN — TORSEMIDE 20 MG: 20 TABLET ORAL at 08:03

## 2019-03-18 RX ADMIN — AZITHROMYCIN 500 MG: 250 TABLET, FILM COATED ORAL at 05:03

## 2019-03-18 RX ADMIN — METHYLPREDNISOLONE SODIUM SUCCINATE 80 MG: 125 INJECTION, POWDER, FOR SOLUTION INTRAMUSCULAR; INTRAVENOUS at 08:03

## 2019-03-18 RX ADMIN — WARFARIN SODIUM 7.5 MG: 2.5 TABLET ORAL at 05:03

## 2019-03-18 NOTE — ASSESSMENT & PLAN NOTE
Chronic resume home antihypertensive medications.  Consult Nephrology for end-stage renal disease management he will maintain his normal routine HD schedule as Monday Wednesday Friday.

## 2019-03-18 NOTE — ED PROVIDER NOTES
Encounter Date: 3/18/2019    SCRIBE #1 NOTE: I, Divina Caruso, am scribing for, and in the presence of, Hardeep Rouse MD.       History     Chief Complaint   Patient presents with    Shortness of Breath     Due for dialysis today at 1045 in [place]       Time seen by provider: 9:46 AM on 03/18/2019    Micheal Hunt is a 78 y.o. male with HTN, HLD, CAD, LVH, ESRD stage V on dialysis (MW&F), mesenteric ischemia, asthma, and anemia who presents to the ED with an onset of difficulty breathing. The patient called his Pulmonologist's office this morning and was referred to the ER for further evaluation. He was admitted 1 week ago for PNA on 3/11 and discharged 3 days ago on 3/15 to follow-up with his Pulmonologist Dr. Mihir Guillen on 3/22 (). The patient states he was short of breath upon discharged but worsened when he got home. He was last dialyzed 3 days ago on 3/15 and is scheduled for dialysis within the hour in [place], [place]. The patient has tried Nebulizer treatments at home with his last treatment being yesterday. He has never smoked tobacco. The patient denies fever or any other symptoms at this time. He has a SHx including a cardiac catheterization and a CABG (4/2007) with a cardiac stent placement. ACE inhibitors, ARB's, Eplerenone, and Sulfa drug allergies noted.      The history is provided by the patient.     Review of patient's allergies indicates:   Allergen Reactions    Ace inhibitors Other (See Comments)     Cough    Arb-angiotensin receptor antagonist Itching    Eplerenone Other (See Comments)     Marked bradycardia, 40, tiredness and weakness      Sulfa (sulfonamide antibiotics) Itching     Patient says this was 10 years ago and doesn't remember what happened     Past Medical History:   Diagnosis Date    NICK (acute kidney injury) 7/29/2016    Allergy     Anemia, mild 12/15/2014    Arthritis     Gout    Benign essential HTN 3/27/2012    BMI 29.0-29.9,adult 5/10/2018    BPH (benign prostatic  hyperplasia)     BPH (benign prostatic hyperplasia)     CAD (coronary artery disease) 2006    Chronic kidney disease     due to ibuprofen    Colon polyp     CRF (chronic renal failure), stage 5     Diverticulosis     Gastritis     GERD (gastroesophageal reflux disease)     Gout     History of colon polyps 5/3/2018    HTN (hypertension) 3/27/2012    Hyperlipidemia     Hyperlipidemia     LLL pneumonia 6/14/2018    LVH (left ventricular hypertrophy)     Mesenteric ischemia     Murmur, cardiac 3/27/2012    GRICELDA (obstructive sleep apnea)     DOES NOT USE A MACHINE    Sinus problem     Syncope and collapse      Past Surgical History:   Procedure Laterality Date    APPENDECTOMY      BLOCK-NERVE Left 5/31/2016    [facility] OR    CARDIAC CATHETERIZATION      COLONOSCOPY  2011    COLONOSCOPY N/A 5/3/2018    [facility] ENDO    COLONOSCOPY N/A 9/10/2015    [facility] END[facility]    CORONARY ARTERY BYPASS GRAFT  4/2007    x 1    CYSTOSCOPY N/A 8/30/2017    [facility] at [facility] OR    CYSTOSCOPY N/A 11/10/2015    [facility] OR    ESOPHAGOGASTRODUODENOSCOPY (EGD) N/A 1/4/2016    Performed [facility] Tra [facility] ENDO (2ND FLR)    ESOPHAGOGASTRODUODENOSCOPY (EGD) N/A 10/15/2014    [facility] ENDO    INJECTION-STEROID-EPIDURAL-TRANSFORAMINAL Left 2/25/2016    [facility] OR    JOINT REPLACEMENT      left knee total replacement  X 3    mid leftt finger      from a cactuss    MOLE REMOVAL  2016    RADIOFREQUENCY THERMOCOAGULATION (RFTC)-NERVE-MEDIAN BRANCH-LUMBAR Left 4/11/2016    [facility] OR    rotative cuff      no rotative cuffs on bilat shoulders has pins     SHOULDER SURGERY      shoulder surgery bilat  RIGHT X 4; LEFT X 3    TRANSRECTAL ULTRASOUND GUIDED PROSTATE BIOPSY  Bilateral 11/10/2015    [facility]    ULTRASOUND-ENDOSCOPIC-UPPER N/A 2017    [facility] ENDO (2ND FLR)    ULTRASOUND-ENDOSCOPIC-UPPER N/A 2016    [facility] ENDO (2ND FLR)    ULTRASOUND-ENDOSCOPIC-UPPER N/A 2015    Per[facility] by [facility] at [facility] ENDO (2ND FLR)     Family History   Problem Relation Age of Onset    Heart disease Mother     Sudden death Father     Cancer Father         advanced lung ca- found after 2 story fall    Stroke Sister     Pneumonia Sister          from PNA    Heart disease Sister     Hypertension Brother     Kidney cancer Neg Hx     Prostate cancer Neg Hx     Urolithiasis Neg Hx     Allergic rhinitis Neg Hx     Allergies Neg Hx     Angioedema Neg Hx     Asthma Neg Hx     Atopy Neg Hx     Eczema Neg Hx     Immunodeficiency Neg Hx     Rhinitis Neg Hx     Urticaria Neg Hx      Social History     Tobacco Use    Smoking status: Never Smoker    Smokeless tobacco: Never Used   Substance Use Topics    Alcohol use: No    Drug use: No     Review of Systems   Constitutional: Negative for activity change, appetite change, chills, fatigue and fever.   Eyes: Negative for visual disturbance.   Respiratory: Positive for shortness of breath. Negative for apnea.    Cardiovascular: Negative for chest pain and palpitations.   Gastrointestinal: Negative for abdominal distention and abdominal pain.   Genitourinary: Negative for difficulty urinating.   Musculoskeletal: Negative for neck pain.   Skin: Negative for pallor and rash.   Neurological: Negative for headaches.   Hematological: Does not bruise/bleed easily.   Psychiatric/Behavioral: Negative for agitation.     Physical Exam     Initial Vitals [19 0933]   BP Pulse Resp Temp SpO2   (!) 169/77 80 20 97.4 °F (36.3 °C) (!) 94 %      MAP       --         Physical Exam    Nursing note and vitals  reviewed.  Constitutional: He appears well-developed and well-nourished.   HENT:   Head: Normocephalic and atraumatic.   Eyes: Conjunctivae are normal.   Neck: Normal range of motion. Neck supple.   Cardiovascular: Normal rate, regular rhythm and normal heart sounds. Exam reveals no gallop and no friction rub.    No murmur heard.  Pulmonary/Chest: No respiratory distress. He has wheezes. He has no rhonchi. He has rales.   Bibasilar rales with expiratory wheezing noted.    Abdominal: Soft. He exhibits no distension. There is no hepatomegaly. There is no tenderness.   Musculoskeletal: Normal range of motion.   Neurological: He is alert and oriented to person, place, and time.   Skin: Skin is warm and dry.   Psychiatric: He has a normal mood and affect.       ED Course   Procedures  Labs Reviewed   B-TYPE NATRIURETIC PEPTIDE - Abnormal; Notable for the following components:       Result Value     (*)     All other components within normal limits   BASIC METABOLIC PANEL - Abnormal; Notable for the following components:    Sodium 133 (*)     CO2 21 (*)     Glucose 366 (*)     BUN, Bld 99 (*)     Creatinine 5.7 (*)     Calcium 7.4 (*)     eGFR if  10 (*)     eGFR if non  9 (*)     All other components within normal limits   CBC W/ AUTO DIFFERENTIAL - Abnormal; Notable for the following components:    RBC 3.44 (*)     Hemoglobin 10.7 (*)     Hematocrit 32.6 (*)     MCH 31.1 (*)     RDW 18.5 (*)     Platelets 106 (*)     MPV 8.1 (*)     Gran # (ANC) 7.8 (*)     Lymph # 0.7 (*)     Gran% 88.3 (*)     Lymph% 7.5 (*)     All other components within normal limits   PROTIME-INR - Abnormal; Notable for the following components:    Prothrombin Time 20.3 (*)     INR 2.0 (*)     All other components within normal limits     EKG Readings: (Independently Interpreted)   Initial Reading: No STEMI.   Atrial fibrillation at a ventricular rate of 70 bpm with normal axis and flipped T-waves in leads I,  VI, and aVL.      Imaging Results          X-Ray Chest AP Portable (Final result)  Result time 03/18/19 12:16:22   Procedure changed from X-Ray Chest PA And Lateral     Final result by Mayuri Castro MD (03/18/19 12:16:22)                 Impression:      Mildly prominent perihilar markings suggesting mild pulmonary vascular distention.      Electronically signed by: Mayuri Castro MD  Date:    03/18/2019  Time:    12:16             Narrative:    EXAMINATION:  XR CHEST AP PORTABLE    CLINICAL HISTORY:  SOB; Shortness of breath    TECHNIQUE:  Single frontal view of the chest was performed.    COMPARISON:  3/14/2019    FINDINGS:  The heart is enlarged.  There are mildly prominent perihilar markings suggesting mild pulmonary vascular distention.  There are no infiltrates..  There is no pleural effusion.  Thoracic aorta is tortuous and may be ectatic as well.                                 Medical Decision Making:   History:   Old Medical Records: I decided to obtain old medical records.  Clinical Tests:   Lab Tests: Reviewed and Ordered  Radiological Study: Ordered and Reviewed  Medical Tests: Reviewed and Ordered  ED Management:  78-year-old male presents with a 3 day history of worsening shortness of breath.  Chest x-ray independently interpreted by me demonstrates increased interstitial markings consistent with CHF.  There is no evidence of pneumonia.  BNP is above 800 consistent with CHF.  He has wheezing considered to the COPD exacerbation.  I discussed the case with his daughter who reports that he has been noncompliant with home breathing treatments due to my lungs are raw).  He will be admitted for dialysis and bronchodilator treatment.  Other:   I have discussed this case with another health care provider.       <> Summary of the Discussion: Discussed with Dr. Ball who will admit the patient       APC / Resident Notes:   I, Dr. Hardeep Rouse III, personally performed the services described in this  documentation. All medical record entries made by the scribe were at my direction and in my presence.  I have reviewed the chart and agree that the record reflects my personal performance and is accurate and complete       Scribe Attestation:   Scribe #1: I performed the above scribed service and the documentation accurately describes the services I performed. I attest to the accuracy of the note.               Clinical Impression:       ICD-10-CM ICD-9-CM   1. Acute on chronic congestive heart failure, unspecified heart failure type I50.9 428.0   2. SOB (shortness of breath) R06.02 786.05         Disposition:   Disposition: Admitted                        Hardeep Rouse III, MD  03/18/19 [id number]

## 2019-03-18 NOTE — UM SECONDARY REVIEW
Physician Advisor External    Level of Care Issue    Sent to EHR for evaluation     EHR rec OP/OBS for date of admission 3/18/19....NADJA

## 2019-03-18 NOTE — NURSING
Pt arrived to floor from dialysis at 1600. Pt went from ED straight to dialysis. Pt resting comfortably in bed, in NAD.

## 2019-03-18 NOTE — HPI
Micheal Hunt is a 78 y.o. male with HTN, HLD, CAD, LVH, ESRD stage V on dialysis (MW&F), mesenteric ischemia, asthma, and anemia who presents to the ED with acute t onset of difficulty breathing for 2 days. Patient presents to the emergency room department with similar complaints on admission dated 03/11/2019.  He reports he was feeling well when he was discharged from the hospital on March 15, 2019.  He went to dialysis straight from the hospital upon discharge and tolerated well.  He has reports increased shortness of breath dizziness and generalized weakness after dialysis.  He presents to the emergency room with complaints of shortness of breath, dizziness and weakness.  He reports he has been taking his nebulizers at home without any improvement of his shortness of breath. The patient called his Pulmonologist's office this morning and was referred to the ER for further evaluation. He was admitted 1 week ago for PNA on 3/11 and discharged 3 days ago on 3/15 to follow-up with his Pulmonologist Dr. Mihir Guillen on 3/22  Laboratory data and emergency room reviewed.  BNP has improved since previous admission.  He states he is to have dialysis this morning and came to the hospital instead of dialysis due to feeling ill.

## 2019-03-18 NOTE — HOSPITAL COURSE
Patient monitor closely during observation stay.  He underwent hemodialysis on his routine schedule Monday Wednesday Friday.  Nephrology consulted.  He was initiated on p.o. prednisone, duo nebs and oral Levaquin.  Pulmonology consulted.

## 2019-03-18 NOTE — ASSESSMENT & PLAN NOTE
Patient with improving BNP since previous admission on 03/11/2019.  Chest x-ray demonstrated mild pulmonary edema.  He will receive hemodialysis as scheduled today on his Monday Wednesday Friday schedule.  Continue home diuretics.

## 2019-03-18 NOTE — TELEPHONE ENCOUNTER
Spoke with mika who voices concerns of pts current condition with ER visits and hospitals stays. requested message be sent to MD and NP to eval plan of care at this point. Message sent to NP and MD with Daughters concerns.   ----- Message from Ester Stanford sent at 3/18/2019  2:51 PM CDT -----  Contact: Mika Hunt - daughter  Type: Needs Medical Advice    Who Called:  Mika/daughter  Best Call Back Number: [phone]  Additional Information: Mika states that the patient is declining has been to  ER three times and is currently in [facility]. She is requesting a call back from the office to discuss the plan of action for her Dad. Please advise. Thanks!

## 2019-03-18 NOTE — ASSESSMENT & PLAN NOTE
Will consult Dr. Guillen.  Resume home prednisone and azithromycin to complete course.   He has been referred to immunologist in the outpatient setting and has not followed up as of yet.

## 2019-03-18 NOTE — INTERVAL H&P NOTE
The patient has been examined and the H&P has been reviewed:    I concur with the findings and no changes have occurred since H&P was written.    Patient presents to the emergency room department with similar complaints on admission dated 03/11/2019.  He reports he was feeling well when he was discharged from the hospital on March 15, 2019.  He went to dialysis straight from the hospital and tolerated well.  He has reports increased shortness of breath dizziness and generalized weakness after dialysis.  He presents to the emergency room with complaints of shortness of breath, dizziness and weakness.  He reports he has been taking his nebulizers at home without any improvement of his shortness of breath.  Laboratory data and emergency room reviewed.  BNP has improved since previous admission.  He states he is to have dialysis this morning and came to the hospital instead of dialysis due to feeling ill.    Active Hospital Problems    Diagnosis  POA    Acute on chronic congestive heart failure [I50.9]  Yes      Resolved Hospital Problems   No resolved problems to display.

## 2019-03-18 NOTE — CONSULTS
03/18/2019      Admit Date: 3/18/2019  Micheal Castrojeremy  New Patient Consult    Chief Complaint   Patient presents with    Shortness of Breath     Due for dialysis today at 1045 in [place]       History of Present Illness:  Pt relates he had burning chest pain assoc with sob off and on since dc last wk  Pt had some vertigo also.  Dialysis and resp meds no help.  Pt has had some wheezes.  Pt relates dialysis no help but breathing improved with nc oxygen applied.      Pt has had no recent hemoptysis.      Patient has a history of 4 pneumonias last year, he also uses prednisone frequently but he denies any dramatic clinical response.  He has intermittent cough with yellow mucus in diffuse weakness on a regular basis.  Patient was institutionalized last week and had a very sluggish clinical response.  He persistent dyspnea despite resolution of wheeze.    Patient is retired rancher and cottrell.  Lives in [place].  Has had low pneumococcal titers in low IgM levels and felt to have immune deficiency disorder good for the definition has not been completed.  Prior pulmonary functions January showed an FEV1 of 71%.  He does have some some pulmonary hypertension with dilated right ventricle, right atria, and left atria-good ejection fraction and mild mitral disease-see below.  PFSH:  Past Medical History:   Diagnosis Date    NICK (acute kidney injury) 7/29/2016    Allergy     Anemia, mild 12/15/2014    Arthritis     Gout    Benign essential HTN 3/27/2012    BMI 29.0-29.9,adult 5/10/2018    BPH (benign prostatic hyperplasia)     BPH (benign prostatic hyperplasia)     CAD (coronary artery disease) 2006    Chronic kidney disease     due to ibuprofen    Colon polyp     CRF (chronic renal failure), stage 5     Diverticulosis     Gastritis     GERD (gastroesophageal reflux disease)     Gout     History of colon polyps 5/3/2018    HTN (hypertension) 3/27/2012    Hyperlipidemia     Hyperlipidemia      LLL pneumonia 6/14/2018    LVH (left ventricular hypertrophy)     Mesenteric ischemia     Murmur, cardiac 3/27/2012    GRICELDA (obstructive sleep apnea)     DOES NOT USE A MACHINE    Sinus problem     Syncope and collapse      Past Surgical History:   Procedure Laterality Date    APPENDECTOMY      BLOCK-NERVE Left 5/31/2016    [facility]    CARDIAC CATHETERIZATION      COLONOSCOPY  2011    COLONOSCOPY N/A 5/3/2018    Performed by [facility]i Harris [facility]O    COLONOSCOPY N/A 9/10/2015    [facility]formed [facility]    CORONARY ARTERY BYPASS GRAFT  4/2007    x 1    CYSTOSCOPY N/A 8/30/2017    [facility]    CYSTOSCOPY N/A 11/10/2015    [facility] at [facility] OR    ESOPHAGOGASTRODUODENOSCOPY (EGD) N/A 1/4/2016    [facility] ([facility]    ESOPHAGOGASTRODUODENOSCOPY (EGD) N/A 10/15/2014    [facility] at [facility] END[facility]    INJECTION-STEROID-EPIDURAL-TRANSFORAMINAL Left 2/25/2016    [facility]    JOINT REPLACEMENT      left knee total replacement  X 3    mid leftt finger      from a cactuss    MOLE REMOVAL  2016    RADIOFREQUENCY THERMOCOAGULATION (RFTC)-NERVE-MEDIAN BRANCH-LUMBAR Left 4/11/2016    [facility]    rotative cuff      no rotative cuffs on bilat shoulders has pins     SHOULDER SURGERY      shoulder surgery bilat  RIGHT X 4; LEFT X 3    TRANSRECTAL ULTRASOUND GUIDED PROSTATE BIOPSY Bilateral 11/10/2015    [facility] OR    ULTRASOUND-ENDOSCOPIC-UPPER N/A 5/31/2017    [facility] (2ND FLR)    ULTRASOUND-ENDOSCOPIC-UPPER N/A 1/4/2016    [facility] (2ND FLR)    ULTRASOUND-ENDOSCOPIC-UPPER N/A 1/16/2015    Performed by Jason [facility] at [facility] ENDO (2ND FLR)     Social History  "    Tobacco Use    Smoking status: Never Smoker    Smokeless tobacco: Never Used   Substance Use Topics    Alcohol use: No    Drug use: No     Family History   Problem Relation Age of Onset    Heart disease Mother     Sudden death Father     Cancer Father         advanced lung ca- found after 2 story fall    Stroke Sister     Pneumonia Sister          from PNA    Heart disease Sister     Hypertension Brother     Kidney cancer Neg Hx     Prostate cancer Neg Hx     Urolithiasis Neg Hx     Allergic rhinitis Neg Hx     Allergies Neg Hx     Angioedema Neg Hx     Asthma Neg Hx     Atopy Neg Hx     Eczema Neg Hx     Immunodeficiency Neg Hx     Rhinitis Neg Hx     Urticaria Neg Hx      Review of patient's allergies indicates:   Allergen Reactions    Ace inhibitors Other (See Comments)     Cough    Arb-angiotensin receptor antagonist Itching    Eplerenone Other (See Comments)     Marked bradycardia, 40, tiredness and weakness      Sulfa (sulfonamide antibiotics) Itching     Patient says this was 10 years ago and doesn't remember what happened       Performance Status:Performance Status:The patient's activity level is housebound activities.    Review of Systems:  a review of eleven systems covering constitutional, Psych, Eye, HEENT, Respiratory, Cardiac, GI, , Musculoskeletal, Endocrine, Dermatologicwas negative except the above mentioned abnormalities and for any pertinent findings as listed below:  pertinent positive as above, rest is good         Exam:Comprehensive exam done. BP (!) 185/93 (Patient Position: Standing)   Pulse 78   Temp 97.4 °F (36.3 °C)   Resp 16   Ht 6' 1" (1.854 m)   Wt 107.9 kg (237 lb 14 oz)   SpO2 98%   BMI 31.38 kg/m²   Exam included Vitals as listed, and patient's appearance and affect and alertness and mood, oral exam for yeast and hygiene and pharynx lesions and Mallapatti (M) score, neck with inspection for jvd and masses and thyroid abnormalities and lymph " nodes (supraclavicular and infraclavicular nodes also examined and noted if abn), chest exam included symmetry and effort and fremitus and percussion and auscultation, cardiac exam included rhythm and gallops and murmur and rubs and jvd and edema, abdominal exam for mass and hepatosplenomegaly and tenderness and hernias and bowel sounds, Musculoskeletal exam with muscle tone and posture and mobility/gait and  strenght, and skin for rashes and cyanosis and pallor and turgor, extremity for clubbing.  Findings were normal except as listed below:  Alert,M4, faint wheezes chest is symmetric, no distress, normal percussion, normal fremitus, no clubbing nor edema.        Radiographs reviewed: view by direct vision  [facility] congestion.  Results for orders placed during the hospital encounter of 03/11/19   X-Ray Chest 1 View    Narrative EXAMINATION:  XR CHEST 1 VIEW    CLINICAL HISTORY:  post HD;    TECHNIQUE:  Single frontal view of the chest was performed.    COMPARISON:  3/11/2019    FINDINGS:  The heart is enlarged.  The cardiomediastinal silhouette is stable.  There is no confluent infiltrate.  There is no pleural effusion.  There are median sternotomy sutures      Impression Cardiomegaly.  No acute cardiopulmonary disease      Electronically signed by: Mayuri Castro MD  Date:    03/14/2019  Time:    10:37   ]  CONCLUSION:  Nocturnal polysomnography demonstrates:  1.  Obstructive sleep apnea to be present with 44.2 events an hour with   desaturation to 81%.  2.  Sinus rhythm with occasional PVC.     PLAN:  He will return for nasal CPAP titration at a later date.        HES/SN  dd: 07/11/2014 19:49:52 (CDT)  td: 07/12/2014 00:48:41 (CDT)  Doc ID   #[id number]  Job ID #[id number]     Labs     Recent Labs   Lab 03/18/19  1004   WBC 8.80   HGB 10.7*   HCT 32.6*   *     Recent Labs   Lab 03/18/19  1004 03/18/19  1606   * 137   K 4.6 4.7   CL 99 99   CO2 21* 28   BUN 99* 65*   CREATININE 5.7* 4.0*   * 208*    CALCIUM 7.4* 8.7   PHOS  --  4.7*   AST  --  39   ALT  --  126*   ALKPHOS  --  107   BILITOT  --  0.7   PROT  --  6.7   ALBUMIN  --  3.8   INR 2.0*  --    *  --    No results for input(s): PH, PCO2, PO2, HCO3 in the last 24 hours.  Microbiology Results (last 7 days)     ** No results found for the last 168 hours. **       Echocardiogram from March 12, 2019Conclusion     · Mild left ventricular enlargement. Normal left ventricular systolic function. The estimated ejection fraction is 55%  · Atrial fibrillation observed with restrictive filling pattern present.  · Severe right ventricular enlargement. Normal right ventricular systolic function.  · Severe left atrial enlargement.  · Severe right atrial enlargement.  · Mild aortic valve stenosis (although calculated PEARL within normal range; mean gradient 17mmHg, peak velocity 2.9m/s). Mild aortic regurgitation.  · Mild mitral regurgitation.  · Mild tricuspid regurgitation.  · Pulmonary hypertension present. 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       Plan:   Suspect multifactorial sob.  Chest burning seems to be  associate with sob but just prior to dc last admit pt said acosta/sob resolved with burning chest pain still.      pulm htn likely role.  Has severe noct desats.  Declines can tolerate cpap.      Will f/'u yimi- if near baseline would avoid high dose steroids. Check amb sat.      Try to arrange 0x,

## 2019-03-18 NOTE — TELEPHONE ENCOUNTER
Reason for Disposition   SEVERE difficulty breathing (e.g., struggling for each breath, speaks in single words, pulse > 120)    Protocols used: BREATHING DIFFICULTY-A-OH    Mr. Hunt states he was recently released from the hospital. Patient states he has severe difficulty breathing. Patient states he cannot not go on like this. Advised patient to call 911. He states his daughter is driving him to the ED.

## 2019-03-18 NOTE — H&P
[facility] Hospital Medicine  History & Physical    Patient Name: Micheal Hunt  MRN: [id number]  Admission Date: 3/18/2019  Attending Physician: Bell Wilkinson NP  Primary Care Provider: Pk Lakhani MD         Patient information was obtained from patient and ER records.     Subjective:     Principal Problem:Severe persistent asthma with acute exacerbation    Chief Complaint:   Chief Complaint   Patient presents with    Shortness of Breath     Due for dialysis today at 1045 in [place]        HPI: Micheal Hunt is a 78 y.o. male with HTN, HLD, CAD, LVH, ESRD stage V on dialysis (MW&F), mesenteric ischemia, asthma, and anemia who presents to the ED with acute t onset of difficulty breathing for 2 days. Patient presents to the emergency room department with similar complaints on admission dated 03/11/2019.  He reports he was feeling well when he was discharged from the hospital on March 15, 2019.  He went to dialysis straight from the hospital upon discharge and tolerated well.  He has reports increased shortness of breath dizziness and generalized weakness after dialysis.  He presents to the emergency room with complaints of shortness of breath, dizziness and weakness.  He reports he has been taking his nebulizers at home without any improvement of his shortness of breath. The patient called his Pulmonologist's office this morning and was referred to the ER for further evaluation. He was admitted 1 week ago for PNA on 3/11 and discharged 3 days ago on 3/15 to follow-up with his Pulmonologist Dr. Mihir Guillen on 3/22  Laboratory data and emergency room reviewed.  BNP has improved since previous admission.  He states he is to have dialysis this morning and came to the hospital instead of dialysis due to feeling ill.            Past Medical History:   Diagnosis Date    NICK (acute kidney injury) 7/29/2016    Allergy     Anemia, mild 12/15/2014    Arthritis     Gout    Benign  essential HTN 3/27/2012    BMI 29.0-29.9,adult 5/10/2018    BPH (benign prostatic hyperplasia)     BPH (benign prostatic hyperplasia)     CAD (coronary artery disease) 2006    Chronic kidney disease     due to ibuprofen    Colon polyp     CRF (chronic renal failure), stage 5     Diverticulosis     Gastritis     GERD (gastroesophageal reflux disease)     Gout     History of colon polyps 5/3/2018    HTN (hypertension) 3/27/2012    Hyperlipidemia     Hyperlipidemia     LLL pneumonia 6/14/2018    LVH (left ventricular hypertrophy)     Mesenteric ischemia     Murmur, cardiac 3/27/2012    GRICELDA (obstructive sleep apnea)     DOES NOT USE A MACHINE    Sinus problem     Syncope and collapse        Past Surgical History:   Procedure Laterality Date    APPENDECTOMY      BLOCK-NERVE Left 5/31/2016    [facility]    CARDIAC CATHETERIZATION      COLONOSCOPY  2011    COLONOSCOPY N/A 5/3/2018    [facility] ENDO    COLONOSCOPY N/A 9/10/2015    [facility]    CORONARY ARTERY BYPASS GRAFT  4/2007    x 1    CYSTOSCOPY N/A 8/30/2017    [facility]    CYSTOSCOPY N/A 11/10/2015    [facility]    ESOPHAGOGASTRODUODENOSCOPY (EGD) N/A 1/4/2016    Performed by Tra [facility] at [facility] ENDO (2ND FLR)    ESOPHAGOGASTRODUODENOSCOPY (EGD) N/A 10/15/2014    [facility]    INJECTION-STEROID-EPIDURAL-TRANSFORAMINAL Left 2/25/2016    [facility]    JOINT REPLACEMENT      left knee total replacement  X 3    mid leftt finger      from a cactuss    MOLE REMOVAL  2016    RADIOFREQUENCY THERMOCOAGULATION (RFTC)-NERVE-MEDIAN BRANCH-LUMBAR Left 4/11/2016    [facility] OR    rotative cuff      no rotative cuffs on bilat shoulders has pins     SHOULDER SURGERY      shoulder  surgery bilat  RIGHT X 4; LEFT X 3    TRANSRECTAL ULTRASOUND GUIDED PROSTATE BIOPSY Bilateral 11/10/2015    [facility]    ULTRASOUND-ENDOSCOPIC-UPPER N/A 5/31/2017    [facility] ENDO (2ND FLR)    ULTRASOUND-ENDOSCOPIC-UPPER N/A 1/4/2016    Performed by [facility] at [facility] ENDO (2ND FLR)    ULTRASOUND-ENDOSCOPIC-UPPER N/A 1/16/2015    Performed by Jason Saleem [facility] at [facility] ENDO (2ND FLR)       Review of patient's allergies indicates:   Allergen Reactions    Ace inhibitors Other (See Comments)     Cough    Arb-angiotensin receptor antagonist Itching    Eplerenone Other (See Comments)     Marked bradycardia, 40, tiredness and weakness      Sulfa (sulfonamide antibiotics) Itching     Patient says this was 10 years ago and doesn't remember what happened       Current Facility-Administered Medications on File Prior to Encounter   Medication    albuterol nebulizer solution 1.25 mg     Current Outpatient Medications on File Prior to Encounter   Medication Sig    albuterol (PROVENTIL/VENTOLIN HFA) 90 mcg/actuation inhaler 2 puffs every 4 hours as needed for cough, wheeze, or shortness of breath    albuterol-ipratropium (DUO-NEB) 2.5 mg-0.5 mg/3 mL nebulizer solution Take 3 mLs by nebulization every 6 (six) hours as needed for Wheezing or Shortness of Breath.    allopurinol (ZYLOPRIM) 100 MG tablet TAKE 1 TABLET BY MOUTH EVERY DAY    aspirin (ECOTRIN) 81 MG EC tablet Take 81 mg by mouth once daily.      atorvastatin (LIPITOR) 80 MG tablet TAKE 1 TABLET (80 MG TOTAL) BY MOUTH ONCE DAILY.    azithromycin (Z-JEROD) 250 MG tablet Take 2 tablets (500 mg total) by mouth once daily. for 5 days    budesonide-formoterol 160-4.5 mcg (SYMBICORT) 160-4.5 mcg/actuation HFAA Inhale 2 puffs into the lungs every 12 (twelve) hours. Controller    carvedilol (COREG) 6.25 MG tablet Take 1 tablet (6.25 mg total) by mouth 2 (two) times daily with meals.     celecoxib (CELEBREX) 200 MG capsule Take 1 capsule (200 mg total) by mouth once daily.    finasteride (PROSCAR) 5 mg tablet TAKE 1 TABLET ONCE DAILY.    fluticasone (FLONASE) 50 mcg/actuation nasal spray 2 sprays (100 mcg total) by Each Nare route once daily.    gabapentin (NEURONTIN) 300 MG capsule Take 1 capsule (300 mg total) by mouth every evening.    isosorbide mononitrate (ISMO,MONOKET) 10 mg tablet TAKE 1 TABLET BY MOUTH EVERY DAY IN THE EVENING    losartan (COZAAR) 100 MG tablet TAKE 1 TABLET BY MOUTH EVERY DAY    meclizine (ANTIVERT) 25 mg tablet TAKE 1 TABLET BY MOUTH THREE TIMES A DAY AS NEEDED    meclizine (ANTIVERT) 25 mg tablet Take 1 tablet (25 mg total) by mouth 3 (three) times daily as needed for Dizziness.    mirtazapine (REMERON) 7.5 MG Tab TAKE 1 TABLET BY MOUTH EVERY EVENING.    NITROSTAT 0.4 mg SL tablet PLACE 1 TABLET (0.4 MG TOTAL) UNDER THE TONGUE EVERY 5 (FIVE) MINUTES AS NEEDED FOR CHEST PAIN.    omeprazole (PRILOSEC) 20 MG capsule TAKE 1 CAPSULE BY MOUTH EVERY DAY    predniSONE (DELTASONE) 20 MG tablet 3 pills for 3 days, 2 pills for 3 days, 1 pill for 3 days, repeat if needed.    sodium chloride (SALINE NASAL MIST) 3 % Mist 1 spray by Nasal route 2 (two) times daily.    tamsulosin (FLOMAX) 0.4 mg Cap TAKE 1 CAPSULE BY MOUTH EVERY DAY    tiotropium bromide (SPIRIVA RESPIMAT) 1.25 mcg/actuation Mist Inhale 2.5 mcg into the lungs once daily. Controller    torsemide (DEMADEX) 20 MG Tab Take 1 tablet (20 mg total) by mouth 2 (two) times daily.    warfarin (COUMADIN) 7.5 MG tablet Take 1 tablet (7.5 mg total) by mouth Daily.    zolpidem (AMBIEN) 5 MG Tab TAKE 1 TABLET BY MOUTH EVERY EVENING AS NEEDED    zolpidem (AMBIEN) 5 MG Tab TAKE 1 TABLET BY MOUTH EVERY EVENING AS NEEDED     Family History     Problem Relation (Age of Onset)    Cancer Father    Heart disease Mother, Sister    Hypertension Brother    Pneumonia Sister    Stroke Sister    Sudden death Father        Tobacco  Use    Smoking status: Never Smoker    Smokeless tobacco: Never Used   Substance and Sexual Activity    Alcohol use: No    Drug use: No    Sexual activity: Not on file     Review of Systems   Constitutional: Positive for activity change, diaphoresis and fatigue. Negative for appetite change and chills.   HENT: Positive for congestion and postnasal drip. Negative for hearing loss and sore throat.    Eyes: Negative for pain and itching.   Respiratory: Positive for cough and shortness of breath. Negative for wheezing.    Cardiovascular: Positive for leg swelling. Negative for chest pain and palpitations.   Gastrointestinal: Negative for abdominal pain, blood in stool and nausea.   Endocrine: Negative for polyphagia and polyuria.   Genitourinary: Negative for dysuria, flank pain and hematuria.   Musculoskeletal: Negative for arthralgias, back pain and myalgias.   Skin: Negative for color change and pallor.   Neurological: Positive for dizziness and light-headedness. Negative for syncope.   Hematological: Negative for adenopathy.   Psychiatric/Behavioral: Negative for agitation and confusion. The patient is nervous/anxious.      Objective:     Vital Signs (Most Recent):  Temp: 98 °F (36.7 °C) (03/18/19 1230)  Pulse: 76 (03/18/19 1330)  Resp: 20 (03/18/19 1230)  BP: (!) 152/87 (03/18/19 1330)  SpO2: 100 % (03/18/19 1103) Vital Signs (24h Range):  Temp:  [97.4 °F (36.3 °C)-98 °F (36.7 °C)] 98 °F (36.7 °C)  Pulse:  [67-80] 76  Resp:  [20-28] 20  SpO2:  [94 %-100 %] 100 %  BP: (152-172)/(77-93) 152/87        Body mass index is 31.4 kg/m².    Physical Exam   Constitutional: He is oriented to person, place, and time. He appears well-developed and well-nourished.   HENT:   Head: Normocephalic and atraumatic.   Right Ear: External ear normal.   Left Ear: External ear normal.   Nose: Nose normal.   Mouth/Throat: Oropharynx is clear and moist.   Eyes: Conjunctivae and EOM are normal. Pupils are equal, round, and reactive to  light.   Neck: Normal range of motion. Neck supple.   Cardiovascular: Normal rate and intact distal pulses.   Murmur heard.  Irregular irregular CVR, + 1 pretib   Pulmonary/Chest: Effort normal. He has wheezes. He has rales.   Abdominal: Soft. Bowel sounds are normal. There is no tenderness.   Musculoskeletal: Normal range of motion. Edema: coarse exp wheezes. decreased at base with few crackles.   Neurological: He is alert and oriented to person, place, and time. Coordination normal.   Skin: Skin is warm and dry.   Psychiatric: He has a normal mood and affect. His behavior is normal.   Nursing note and vitals reviewed.        CRANIAL NERVES     CN III, IV, VI   Pupils are equal, round, and reactive to light.  Extraocular motions are normal.        Significant Labs:   CBC:   Recent Labs   Lab 03/18/19  1004   WBC 8.80   HGB 10.7*   HCT 32.6*   *     CMP:   Recent Labs   Lab 03/18/19  1004   *   K 4.6   CL 99   CO2 21*   *   BUN 99*   CREATININE 5.7*   CALCIUM 7.4*   ANIONGAP 13   EGFRNONAA 9*       Significant Imaging: I have reviewed and interpreted all pertinent imaging results/findings within the past 24 hours.    Assessment/Plan:     * Severe persistent asthma with acute exacerbation    Will consult Dr. Guillen.  Resume home prednisone and azithromycin to complete course.   He has been referred to immunologist in the outpatient setting and has not followed up as of yet.       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  Consult Nephrology for end-stage renal disease management he will maintain his normal routine HD schedule as Monday Wednesday Friday.       Type 2 diabetes mellitus, without long-term current use of insulin    Accu-Cheks AC and HS.  Insulin correction scale.       Acute on chronic diastolic congestive heart failure    Patient with improving BNP since previous admission on  03/11/2019.  Chest x-ray demonstrated mild pulmonary edema.  He will receive hemodialysis as scheduled today on his Monday Wednesday Friday schedule.  Continue home diuretics.       Persistent atrial fibrillation    Resume beta-blocker and warfarin.  Trend INR.       Secondary hyperparathyroidism    Chronic resume home vitamin-D analogs       Anemia due to chronic kidney disease, on chronic dialysis    Deferred to Nephrology.  Trend hemoglobin and hematocrit.  Epogen as needed       Vertigo    Resume home meclizine       VTE Risk Mitigation (From admission, onward)        Ordered     warfarin tablet 7.5 mg  Daily      03/18/19 1330     IP VTE HIGH RISK PATIENT  Once      03/18/19 1330     Reason for no Mechanical VTE Prophylaxis  Once      03/18/19 1330             Bell Wilkinson NP  Department of Hospital Medicine   [facility]

## 2019-03-18 NOTE — CONSULTS
Consult Note  Nephrology    Consult Requested By: Hardeep Rouse III, MD    Reason for Consult: ESRD, dependent on dialysis    SUBJECTIVE:     History of Present Illness:  79 y/o male patient presented to ER today with c/o chest pain, SOB, dizziness.  He has out patient HD in [place] on MWF, and denies missing any treatments.  States he can only tolerate about 2L pulled without cramping.  Renal is consulted for dialysis orders.    3/18  CXR shows pulmonary vascular distention.  Pt was seen in dialysis today, has only had about 300cc UF so far, so still symptomatic.  Has mild pedal edema, .    Asessment/plan:    1.  ESRD--dialysis today, seen on dialysis.  UF goal 3L.  If he does not tolerate adequate fluid removal, will repeat treatment tomorrow for additional UF.  2.  Fluid volume overload--UF as above  3.  Anemia of chronic dz--Hgb >10, no need for epo at present time  4.  Mild hyponatremia--will correct with removal of fluid  5.  SHPT--no binders on med list, Ca+ low--but no albumin level.  Will add on CMP and phos.    Past Medical History:   Diagnosis Date    NICK (acute kidney injury) 7/29/2016    Allergy     Anemia, mild 12/15/2014    Arthritis     Gout    Benign essential HTN 3/27/2012    BMI 29.0-29.9,adult 5/10/2018    BPH (benign prostatic hyperplasia)     BPH (benign prostatic hyperplasia)     CAD (coronary artery disease) 2006    Chronic kidney disease     due to ibuprofen    Colon polyp     CRF (chronic renal failure), stage 5     Diverticulosis     Gastritis     GERD (gastroesophageal reflux disease)     Gout     History of colon polyps 5/3/2018    HTN (hypertension) 3/27/2012    Hyperlipidemia     Hyperlipidemia     LLL pneumonia 6/14/2018    LVH (left ventricular hypertrophy)     Mesenteric ischemia     Murmur, cardiac 3/27/2012    GRICELDA (obstructive sleep apnea)     DOES NOT USE A MACHINE    Sinus problem     Syncope and collapse      Past Surgical History:    Procedure Laterality Date    APPENDECTOMY      BLOCK-NERVE Left 2016    [facility]    CARDIAC CATHETERIZATION      COLONOSCOPY      COLONOSCOPY N/A 5/3/2018    [facility]O    COLONOSCOPY N/A 9/10/2015    [facility]    CORONARY ARTERY BYPASS GRAFT  4/2007    x 1    CYSTOSCOPY N/A 2017    [facility] at [facility] OR    CYSTOSCOPY N/A 11/10/2015    [facility] at [facility] OR    ESOPHAGOGASTRODUODENOSCOPY (EGD) N/A 2016    [facility] ([facility]    ESOPHAGOGASTRODUODENOSCOPY (EGD) N/A 10/15/2014    [facility] at [facility] ENDO    INJECTION-STEROID-EPIDURAL-TRANSFORAMINAL Left 2016    [facility] OR    JOINT REPLACEMENT      left knee total replacement  X 3    mid leftt finger      from a cactuss    MOLE REMOVAL      RADIOFREQUENCY THERMOCOAGULATION (RFTC)-NERVE-MEDIAN BRANCH-LUMBAR Left 2016    [facility] OR    rotative cuff      no rotative cuffs on bilat shoulders has pins     SHOULDER SURGERY      shoulder surgery bilat  RIGHT X 4; LEFT X 3    TRANSRECTAL ULTRASOUND GUIDED PROSTATE BIOPSY Bilateral 11/10/2015    [facility] at [facility] OR    ULTRASOUND-ENDOSCOPIC-UPPER N/A 2017    Performed by Tra [facility] at [facility] ENDO (2ND FLR)    ULTRASOUND-ENDOSCOPIC-UPPER N/A 2016    Performed by Tra Brooks [facility] at [facility] ENDO (2ND FLR)    ULTRASOUND-ENDOSCOPIC-UPPER N/A 2015    Performed by Jason Saleem MD at [facility] ENDO (2ND FLR)     Family History   Problem Relation Age of Onset    Heart disease Mother     Sudden death Father     Cancer Father         advanced lung ca- found after 2 story fall    Stroke Sister     Pneumonia Sister          from PNA    Heart disease Sister     Hypertension  "Brother     Kidney cancer Neg Hx     Prostate cancer Neg Hx     Urolithiasis Neg Hx     Allergic rhinitis Neg Hx     Allergies Neg Hx     Angioedema Neg Hx     Asthma Neg Hx     Atopy Neg Hx     Eczema Neg Hx     Immunodeficiency Neg Hx     Rhinitis Neg Hx     Urticaria Neg Hx      Social History     Tobacco Use    Smoking status: Never Smoker    Smokeless tobacco: Never Used   Substance Use Topics    Alcohol use: No    Drug use: No       Review of patient's allergies indicates:   Allergen Reactions    Ace inhibitors Other (See Comments)     Cough    Arb-angiotensin receptor antagonist Itching    Eplerenone Other (See Comments)     Marked bradycardia, 40, tiredness and weakness      Sulfa (sulfonamide antibiotics) Itching     Patient says this was 10 years ago and doesn't remember what happened        Review of Systems:  General ROS: negative for - fever or night sweats  Psychological ROS: negative for - behavioral disorder or depression  ENT ROS: negative for - headaches or visual changes  Hematological and Lymphatic ROS: negative for - bleeding problems or bruising  Endocrine ROS: negative for - temperature intolerance or unexpected weight changes  Respiratory ROS: positive for cough, SOB and wheezing  Cardiovascular ROS: chest pain "burning"  Gastrointestinal ROS: no abdominal pain, change in bowel habits  Musculoskeletal ROS: negative for - joint pain or joint swelling  Neurological ROS: dizziness  Dermatological ROS: negative for rash and skin lesion changes    OBJECTIVE:     Vital Signs Range (Last 24H):  Temp:  [97.4 °F (36.3 °C)]   Pulse:  [67-80]   Resp:  [20-28]   BP: (165-172)/(77-88)   SpO2:  [94 %-100 %]     Physical Exam:  General- Patient alert and oriented x3 in NAD  HEENT- WNL  Neck- supple  CV- Regular rate and rhythm,   Resp- bilat wheezing, rhonchi/crackles  GI- Non tender/non-distended  Extrem- mildly edemetous  Derm- w/d  Neuro-  No flap. No focal deficits noted.     Body " mass index is 31.4 kg/m².    Laboratory:  CBC:   Recent Labs   Lab 03/18/19  1004   WBC 8.80   RBC 3.44*   HGB 10.7*   HCT 32.6*   *   MCV 95   MCH 31.1*   MCHC 32.8     CMP:   Recent Labs   Lab 03/15/19  0617 03/18/19  1004   * 366*   CALCIUM 7.5* 7.4*   ALBUMIN 3.2*  --    PROT 5.5*  --    * 133*   K 4.9 4.6   CO2 23 21*    99   * 99*   CREATININE 5.1* 5.7*   ALKPHOS 97  --    *  --    AST 25  --    BILITOT 0.4  --        Diagnostic Results:  Labs: Reviewed  X-Ray: Reviewed        ASSESSMENT/PLAN:     Active Hospital Problems    Diagnosis  POA    Acute on chronic congestive heart failure [I50.9]  Yes      Resolved Hospital Problems   No resolved problems to display.         Thank you for allowing us to participate in the care of your patient. We will follow the patient and provide recommendations as needed.      Time spent seeing patient( greater than 1/2 spent in direct contact) :

## 2019-03-18 NOTE — SUBJECTIVE & OBJECTIVE
Past Medical History:   Diagnosis Date    NICK (acute kidney injury) 7/29/2016    Allergy     Anemia, mild 12/15/2014    Arthritis     Gout    Benign essential HTN 3/27/2012    BMI 29.0-29.9,adult 5/10/2018    BPH (benign prostatic hyperplasia)     BPH (benign prostatic hyperplasia)     CAD (coronary artery disease) 2006    Chronic kidney disease     due to ibuprofen    Colon polyp     CRF (chronic renal failure), stage 5     Diverticulosis     Gastritis     GERD (gastroesophageal reflux disease)     Gout     History of colon polyps 5/3/2018    HTN (hypertension) 3/27/2012    Hyperlipidemia     Hyperlipidemia     LLL pneumonia 6/14/2018    LVH (left ventricular hypertrophy)     Mesenteric ischemia     Murmur, cardiac 3/27/2012    GRICELDA (obstructive sleep apnea)     DOES NOT USE A MACHINE    Sinus problem     Syncope and collapse        Past Surgical History:   Procedure Laterality Date    APPENDECTOMY      BLOCK-NERVE Left 5/31/2016    [facility]    CARDIAC CATHETERIZATION      COLONOSCOPY  2011    COLONOSCOPY N/A 5/3/2018    [facility]    COLONOSCOPY N/A 9/10/2015    [facility]    CORONARY ARTERY BYPASS GRAFT  4/2007    x 1    CYSTOSCOPY N/A 8/30/2017    [facility] at [facility]    CYSTOSCOPY N/A 11/10/2015    [facility]    ESOPHAGOGASTRODUODENOSCOPY (EGD) N/A 1/4/2016    [facility] END[facility] (2ND FLR)    ESOPHAGOGASTRODUODENOSCOPY (EGD) N/A 10/15/2014    [facility]    INJECTION-STEROID-EPIDURAL-TRANSFORAMINAL Left 2/25/2016    [facility] at [facility] OR    JOINT REPLACEMENT      left knee total replacement  X 3    mid leftt finger      from a cactuss    MOLE REMOVAL  2016    RADIOFREQUENCY THERMOCOAGULATION (RFTC)-NERVE-MEDIAN BRANCH-LUMBAR Left  4/11/2016    [facility]    rotative cuff      no rotative cuffs on bilat shoulders has pins     SHOULDER SURGERY      shoulder surgery bilat  RIGHT X 4; LEFT X 3    TRANSRECTAL ULTRASOUND GUIDED PROSTATE BIOPSY Bilateral 11/10/2015    [facility]    ULTRASOUND-ENDOSCOPIC-UPPER N/A 5/31/2017    Performed by Tra [facility] at [facility] ENDO (2ND FLR)    ULTRASOUND-ENDOSCOPIC-UPPER N/A 1/4/2016    Performed by Tra [facility] at [facility] ENDO (2ND FLR)    ULTRASOUND-ENDOSCOPIC-UPPER N/A 1/16/2015    Performed by Jason Saleem MD at [facility] ENDO (2ND FLR)       Review of patient's allergies indicates:   Allergen Reactions    Ace inhibitors Other (See Comments)     Cough    Arb-angiotensin receptor antagonist Itching    Eplerenone Other (See Comments)     Marked bradycardia, 40, tiredness and weakness      Sulfa (sulfonamide antibiotics) Itching     Patient says this was 10 years ago and doesn't remember what happened       Current Facility-Administered Medications on File Prior to Encounter   Medication    albuterol nebulizer solution 1.25 mg     Current Outpatient Medications on File Prior to Encounter   Medication Sig    albuterol (PROVENTIL/VENTOLIN HFA) 90 mcg/actuation inhaler 2 puffs every 4 hours as needed for cough, wheeze, or shortness of breath    albuterol-ipratropium (DUO-NEB) 2.5 mg-0.5 mg/3 mL nebulizer solution Take 3 mLs by nebulization every 6 (six) hours as needed for Wheezing or Shortness of Breath.    allopurinol (ZYLOPRIM) 100 MG tablet TAKE 1 TABLET BY MOUTH EVERY DAY    aspirin (ECOTRIN) 81 MG EC tablet Take 81 mg by mouth once daily.      atorvastatin (LIPITOR) 80 MG tablet TAKE 1 TABLET (80 MG TOTAL) BY MOUTH ONCE DAILY.    azithromycin (Z-JEROD) 250 MG tablet Take 2 tablets (500 mg total) by mouth once daily. for 5 days    budesonide-formoterol 160-4.5 mcg (SYMBICORT) 160-4.5 mcg/actuation HFAA Inhale 2 puffs into the  lungs every 12 (twelve) hours. Controller    carvedilol (COREG) 6.25 MG tablet Take 1 tablet (6.25 mg total) by mouth 2 (two) times daily with meals.    celecoxib (CELEBREX) 200 MG capsule Take 1 capsule (200 mg total) by mouth once daily.    finasteride (PROSCAR) 5 mg tablet TAKE 1 TABLET ONCE DAILY.    fluticasone (FLONASE) 50 mcg/actuation nasal spray 2 sprays (100 mcg total) by Each Nare route once daily.    gabapentin (NEURONTIN) 300 MG capsule Take 1 capsule (300 mg total) by mouth every evening.    isosorbide mononitrate (ISMO,MONOKET) 10 mg tablet TAKE 1 TABLET BY MOUTH EVERY DAY IN THE EVENING    losartan (COZAAR) 100 MG tablet TAKE 1 TABLET BY MOUTH EVERY DAY    meclizine (ANTIVERT) 25 mg tablet TAKE 1 TABLET BY MOUTH THREE TIMES A DAY AS NEEDED    meclizine (ANTIVERT) 25 mg tablet Take 1 tablet (25 mg total) by mouth 3 (three) times daily as needed for Dizziness.    mirtazapine (REMERON) 7.5 MG Tab TAKE 1 TABLET BY MOUTH EVERY EVENING.    NITROSTAT 0.4 mg SL tablet PLACE 1 TABLET (0.4 MG TOTAL) UNDER THE TONGUE EVERY 5 (FIVE) MINUTES AS NEEDED FOR CHEST PAIN.    omeprazole (PRILOSEC) 20 MG capsule TAKE 1 CAPSULE BY MOUTH EVERY DAY    predniSONE (DELTASONE) 20 MG tablet 3 pills for 3 days, 2 pills for 3 days, 1 pill for 3 days, repeat if needed.    sodium chloride (SALINE NASAL MIST) 3 % Mist 1 spray by Nasal route 2 (two) times daily.    tamsulosin (FLOMAX) 0.4 mg Cap TAKE 1 CAPSULE BY MOUTH EVERY DAY    tiotropium bromide (SPIRIVA RESPIMAT) 1.25 mcg/actuation Mist Inhale 2.5 mcg into the lungs once daily. Controller    torsemide (DEMADEX) 20 MG Tab Take 1 tablet (20 mg total) by mouth 2 (two) times daily.    warfarin (COUMADIN) 7.5 MG tablet Take 1 tablet (7.5 mg total) by mouth Daily.    zolpidem (AMBIEN) 5 MG Tab TAKE 1 TABLET BY MOUTH EVERY EVENING AS NEEDED    zolpidem (AMBIEN) 5 MG Tab TAKE 1 TABLET BY MOUTH EVERY EVENING AS NEEDED     Family History     Problem Relation (Age of  Onset)    Cancer Father    Heart disease Mother, Sister    Hypertension Brother    Pneumonia Sister    Stroke Sister    Sudden death Father        Tobacco Use    Smoking status: Never Smoker    Smokeless tobacco: Never Used   Substance and Sexual Activity    Alcohol use: No    Drug use: No    Sexual activity: Not on file     Review of Systems   Constitutional: Positive for activity change, diaphoresis and fatigue. Negative for appetite change and chills.   HENT: Positive for congestion and postnasal drip. Negative for hearing loss and sore throat.    Eyes: Negative for pain and itching.   Respiratory: Positive for cough and shortness of breath. Negative for wheezing.    Cardiovascular: Positive for leg swelling. Negative for chest pain and palpitations.   Gastrointestinal: Negative for abdominal pain, blood in stool and nausea.   Endocrine: Negative for polyphagia and polyuria.   Genitourinary: Negative for dysuria, flank pain and hematuria.   Musculoskeletal: Negative for arthralgias, back pain and myalgias.   Skin: Negative for color change and pallor.   Neurological: Positive for dizziness and light-headedness. Negative for syncope.   Hematological: Negative for adenopathy.   Psychiatric/Behavioral: Negative for agitation and confusion. The patient is nervous/anxious.      Objective:     Vital Signs (Most Recent):  Temp: 98 °F (36.7 °C) (03/18/19 1230)  Pulse: 76 (03/18/19 1330)  Resp: 20 (03/18/19 1230)  BP: (!) 152/87 (03/18/19 1330)  SpO2: 100 % (03/18/19 1103) Vital Signs (24h Range):  Temp:  [97.4 °F (36.3 °C)-98 °F (36.7 °C)] 98 °F (36.7 °C)  Pulse:  [67-80] 76  Resp:  [20-28] 20  SpO2:  [94 %-100 %] 100 %  BP: (152-172)/(77-93) 152/87        Body mass index is 31.4 kg/m².    Physical Exam   Constitutional: He is oriented to person, place, and time. He appears well-developed and well-nourished.   HENT:   Head: Normocephalic and atraumatic.   Right Ear: External ear normal.   Left Ear: External ear  normal.   Nose: Nose normal.   Mouth/Throat: Oropharynx is clear and moist.   Eyes: Conjunctivae and EOM are normal. Pupils are equal, round, and reactive to light.   Neck: Normal range of motion. Neck supple.   Cardiovascular: Normal rate and intact distal pulses.   Murmur heard.  Irregular irregular CVR, + 1 pretib   Pulmonary/Chest: Effort normal. He has wheezes. He has rales.   Abdominal: Soft. Bowel sounds are normal. There is no tenderness.   Musculoskeletal: Normal range of motion. Edema: coarse exp wheezes. decreased at base with few crackles.   Neurological: He is alert and oriented to person, place, and time. Coordination normal.   Skin: Skin is warm and dry.   Psychiatric: He has a normal mood and affect. His behavior is normal.   Nursing note and vitals reviewed.        CRANIAL NERVES     CN III, IV, VI   Pupils are equal, round, and reactive to light.  Extraocular motions are normal.        Significant Labs:   CBC:   Recent Labs   Lab 03/18/19  1004   WBC 8.80   HGB 10.7*   HCT 32.6*   *     CMP:   Recent Labs   Lab 03/18/19  1004   *   K 4.6   CL 99   CO2 21*   *   BUN 99*   CREATININE 5.7*   CALCIUM 7.4*   ANIONGAP 13   EGFRNONAA 9*       Significant Imaging: I have reviewed and interpreted all pertinent imaging results/findings within the past 24 hours.

## 2019-03-19 ENCOUNTER — TELEPHONE (OUTPATIENT)
Dept: PULMONOLOGY | Facility: CLINIC | Age: 79
End: 2019-03-19

## 2019-03-19 LAB
ANION GAP SERPL CALC-SCNC: 13 MMOL/L
BASOPHILS # BLD AUTO: 0 K/UL
BASOPHILS NFR BLD: 0 %
BR6MWT: NORMAL
BRPFT: ABNORMAL
BUN SERPL-MCNC: 78 MG/DL
CALCIUM SERPL-MCNC: 7.9 MG/DL
CHLORIDE SERPL-SCNC: 102 MMOL/L
CO2 SERPL-SCNC: 21 MMOL/L
CREAT SERPL-MCNC: 4.7 MG/DL
DIFFERENTIAL METHOD: ABNORMAL
EOSINOPHIL # BLD AUTO: 0 K/UL
EOSINOPHIL NFR BLD: 0 %
ERYTHROCYTE [DISTWIDTH] IN BLOOD BY AUTOMATED COUNT: 18.4 %
EST. GFR  (AFRICAN AMERICAN): 13 ML/MIN/1.73 M^2
EST. GFR  (NON AFRICAN AMERICAN): 11 ML/MIN/1.73 M^2
FEF 25 75 LLN: 0.82
FEF 25 75 PRE REF: 121.3 %
FEF 25 75 REF: 2.15
FEV1 FVC LLN: 60
FEV1 FVC PRE REF: 111.9 %
FEV1 FVC REF: 74
FEV1 LLN: 2.14
FEV1 PRE REF: 65.8 %
FEV1 REF: 3.09
FVC LLN: 3.03
FVC PRE REF: 58.2 %
FVC REF: 4.19
GLUCOSE SERPL-MCNC: 286 MG/DL
GLUCOSE SERPL-MCNC: 495 MG/DL
HCT VFR BLD AUTO: 32.5 %
HGB BLD-MCNC: 10.7 G/DL
INR PPP: 3.3
LYMPHOCYTES # BLD AUTO: 0.5 K/UL
LYMPHOCYTES NFR BLD: 7.2 %
MCH RBC QN AUTO: 31.1 PG
MCHC RBC AUTO-ENTMCNC: 33 G/DL
MCV RBC AUTO: 95 FL
MONOCYTES # BLD AUTO: 0.1 K/UL
MONOCYTES NFR BLD: 1.1 %
NEUTROPHILS # BLD AUTO: 6.4 K/UL
NEUTROPHILS NFR BLD: 91.7 %
PEF LLN: 5.37
PEF PRE REF: 93 %
PEF REF: 7.83
PLATELET # BLD AUTO: 109 K/UL
PMV BLD AUTO: 8.4 FL
POCT GLUCOSE: 306 MG/DL (ref 70–110)
POCT GLUCOSE: 375 MG/DL (ref 70–110)
POCT GLUCOSE: 416 MG/DL (ref 70–110)
POCT GLUCOSE: 495 MG/DL (ref 70–110)
POTASSIUM SERPL-SCNC: 5 MMOL/L
PRE FEF 25 75: 2.61 L/S (ref 0.82–3.48)
PRE FET 100: 5.21 SEC
PRE FEV1 FVC: 83.24 % (ref 59.75–89)
PRE FEV1: 2.03 L (ref 2.14–4.03)
PRE FVC: 2.44 L (ref 3.03–5.35)
PRE PEF: 7.28 L/S (ref 5.37–10.28)
PROTHROMBIN TIME: 33.5 SEC
RBC # BLD AUTO: 3.44 M/UL
SODIUM SERPL-SCNC: 136 MMOL/L
WBC # BLD AUTO: 7 K/UL

## 2019-03-19 PROCEDURE — 85610 PROTHROMBIN TIME: CPT

## 2019-03-19 PROCEDURE — 25000003 PHARM REV CODE 250: Performed by: NURSE PRACTITIONER

## 2019-03-19 PROCEDURE — G0378 HOSPITAL OBSERVATION PER HR: HCPCS

## 2019-03-19 PROCEDURE — 94618 PULMONARY STRESS TESTING: CPT

## 2019-03-19 PROCEDURE — G8979 MOBILITY GOAL STATUS: HCPCS | Mod: CH

## 2019-03-19 PROCEDURE — 25000003 PHARM REV CODE 250: Performed by: HOSPITALIST

## 2019-03-19 PROCEDURE — 63600175 PHARM REV CODE 636 W HCPCS: Performed by: EMERGENCY MEDICINE

## 2019-03-19 PROCEDURE — 85025 COMPLETE CBC W/AUTO DIFF WBC: CPT

## 2019-03-19 PROCEDURE — 25000242 PHARM REV CODE 250 ALT 637 W/ HCPCS: Performed by: NURSE PRACTITIONER

## 2019-03-19 PROCEDURE — 97161 PT EVAL LOW COMPLEX 20 MIN: CPT

## 2019-03-19 PROCEDURE — 94640 AIRWAY INHALATION TREATMENT: CPT

## 2019-03-19 PROCEDURE — G8980 MOBILITY D/C STATUS: HCPCS | Mod: CH

## 2019-03-19 PROCEDURE — 96376 TX/PRO/DX INJ SAME DRUG ADON: CPT

## 2019-03-19 PROCEDURE — 63600175 PHARM REV CODE 636 W HCPCS: Performed by: NURSE PRACTITIONER

## 2019-03-19 PROCEDURE — 36415 COLL VENOUS BLD VENIPUNCTURE: CPT

## 2019-03-19 PROCEDURE — 90935 HEMODIALYSIS ONE EVALUATION: CPT

## 2019-03-19 PROCEDURE — 96372 THER/PROPH/DIAG INJ SC/IM: CPT

## 2019-03-19 PROCEDURE — 63600175 PHARM REV CODE 636 W HCPCS: Performed by: INTERNAL MEDICINE

## 2019-03-19 PROCEDURE — G8978 MOBILITY CURRENT STATUS: HCPCS | Mod: CH

## 2019-03-19 PROCEDURE — 27000221 HC OXYGEN, UP TO 24 HOURS

## 2019-03-19 PROCEDURE — 82947 ASSAY GLUCOSE BLOOD QUANT: CPT

## 2019-03-19 PROCEDURE — 99900035 HC TECH TIME PER 15 MIN (STAT)

## 2019-03-19 PROCEDURE — 94761 N-INVAS EAR/PLS OXIMETRY MLT: CPT

## 2019-03-19 PROCEDURE — 80048 BASIC METABOLIC PNL TOTAL CA: CPT

## 2019-03-19 PROCEDURE — 94010 BREATHING CAPACITY TEST: CPT

## 2019-03-19 RX ORDER — HEPARIN SODIUM 5000 [USP'U]/ML
5000 INJECTION, SOLUTION INTRAVENOUS; SUBCUTANEOUS
Status: CANCELLED | OUTPATIENT
Start: 2019-03-19

## 2019-03-19 RX ORDER — SODIUM CHLORIDE 9 MG/ML
INJECTION, SOLUTION INTRAVENOUS ONCE
Status: CANCELLED | OUTPATIENT
Start: 2019-03-19 | End: 2019-03-19

## 2019-03-19 RX ORDER — SODIUM CHLORIDE 9 MG/ML
INJECTION, SOLUTION INTRAVENOUS
Status: CANCELLED | OUTPATIENT
Start: 2019-03-19

## 2019-03-19 RX ORDER — TRAMADOL HYDROCHLORIDE 50 MG/1
50 TABLET ORAL EVERY 6 HOURS PRN
Status: DISCONTINUED | OUTPATIENT
Start: 2019-03-19 | End: 2019-03-22 | Stop reason: HOSPADM

## 2019-03-19 RX ORDER — INSULIN ASPART 100 [IU]/ML
1-10 INJECTION, SOLUTION INTRAVENOUS; SUBCUTANEOUS
Status: DISCONTINUED | OUTPATIENT
Start: 2019-03-19 | End: 2019-03-22 | Stop reason: HOSPADM

## 2019-03-19 RX ADMIN — IPRATROPIUM BROMIDE AND ALBUTEROL SULFATE 3 ML: .5; 3 SOLUTION RESPIRATORY (INHALATION) at 11:03

## 2019-03-19 RX ADMIN — IPRATROPIUM BROMIDE AND ALBUTEROL SULFATE 3 ML: .5; 3 SOLUTION RESPIRATORY (INHALATION) at 08:03

## 2019-03-19 RX ADMIN — METHYLPREDNISOLONE SODIUM SUCCINATE 80 MG: 125 INJECTION, POWDER, FOR SOLUTION INTRAMUSCULAR; INTRAVENOUS at 05:03

## 2019-03-19 RX ADMIN — AZITHROMYCIN 500 MG: 250 TABLET, FILM COATED ORAL at 08:03

## 2019-03-19 RX ADMIN — ALLOPURINOL 100 MG: 100 TABLET ORAL at 08:03

## 2019-03-19 RX ADMIN — TORSEMIDE 20 MG: 20 TABLET ORAL at 08:03

## 2019-03-19 RX ADMIN — IPRATROPIUM BROMIDE AND ALBUTEROL SULFATE 3 ML: .5; 3 SOLUTION RESPIRATORY (INHALATION) at 12:03

## 2019-03-19 RX ADMIN — INSULIN ASPART 3 UNITS: 100 INJECTION, SOLUTION INTRAVENOUS; SUBCUTANEOUS at 06:03

## 2019-03-19 RX ADMIN — ATORVASTATIN CALCIUM 80 MG: 40 TABLET, FILM COATED ORAL at 08:03

## 2019-03-19 RX ADMIN — MIRTAZAPINE 7.5 MG: 7.5 TABLET ORAL at 08:03

## 2019-03-19 RX ADMIN — INSULIN ASPART 4 UNITS: 100 INJECTION, SOLUTION INTRAVENOUS; SUBCUTANEOUS at 08:03

## 2019-03-19 RX ADMIN — IPRATROPIUM BROMIDE AND ALBUTEROL SULFATE 3 ML: .5; 3 SOLUTION RESPIRATORY (INHALATION) at 03:03

## 2019-03-19 RX ADMIN — CARVEDILOL 6.25 MG: 6.25 TABLET, FILM COATED ORAL at 07:03

## 2019-03-19 RX ADMIN — GABAPENTIN 300 MG: 300 CAPSULE ORAL at 08:03

## 2019-03-19 RX ADMIN — TRAMADOL HYDROCHLORIDE 50 MG: 50 TABLET, FILM COATED ORAL at 07:03

## 2019-03-19 RX ADMIN — PREDNISONE 40 MG: 20 TABLET ORAL at 08:03

## 2019-03-19 RX ADMIN — ASPIRIN 81 MG: 81 TABLET, COATED ORAL at 08:03

## 2019-03-19 RX ADMIN — FINASTERIDE 5 MG: 5 TABLET, FILM COATED ORAL at 08:03

## 2019-03-19 RX ADMIN — METHYLPREDNISOLONE SODIUM SUCCINATE 80 MG: 125 INJECTION, POWDER, FOR SOLUTION INTRAMUSCULAR; INTRAVENOUS at 09:03

## 2019-03-19 RX ADMIN — INSULIN ASPART 10 UNITS: 100 INJECTION, SOLUTION INTRAVENOUS; SUBCUTANEOUS at 11:03

## 2019-03-19 RX ADMIN — METHYLPREDNISOLONE SODIUM SUCCINATE 80 MG: 125 INJECTION, POWDER, FOR SOLUTION INTRAMUSCULAR; INTRAVENOUS at 01:03

## 2019-03-19 RX ADMIN — LOSARTAN POTASSIUM 100 MG: 25 TABLET ORAL at 08:03

## 2019-03-19 RX ADMIN — ISOSORBIDE MONONITRATE 10 MG: 10 TABLET ORAL at 08:03

## 2019-03-19 RX ADMIN — TAMSULOSIN HYDROCHLORIDE 0.4 MG: 0.4 CAPSULE ORAL at 08:03

## 2019-03-19 NOTE — PLAN OF CARE
03/19/19 0824   Patient Assessment/Suction   Level of Consciousness (AVPU) alert   Respiratory Effort Normal;Unlabored   Expansion/Accessory Muscles/Retractions no use of accessory muscles;no retractions;expansion symmetric   All Lung Fields Breath Sounds coarse;wheezes, expiratory   Rhythm/Pattern, Respiratory depth regular;pattern regular;unlabored   Cough Frequency infrequent   Cough Type dry;nonproductive   PRE-TX-O2   O2 Device (Oxygen Therapy) room air   SpO2 99 %   Pulse Oximetry Type Intermittent   Pulse 97   Resp 20   Aerosol Therapy   $ Aerosol Therapy Charges Aerosol Treatment   Respiratory Treatment Status (SVN) given   Treatment Route (SVN) mouthpiece   Patient Position (SVN) semi-Holloway's   Post Treatment Assessment (SVN) increased aeration   Signs of Intolerance (SVN) none   Breath Sounds Post-Respiratory Treatment   Throughout All Fields Post-Treatment All Fields   Throughout All Fields Post-Treatment aeration increased   Post-treatment Heart Rate (beats/min) 99   Post-treatment Resp Rate (breaths/min) 20       Aerosol treatments q4 with Duoneb.

## 2019-03-19 NOTE — UM SECONDARY REVIEW
Physician Advisor External    Level of Care Issue    Sent to EHR for evaluation     EHR rec OP- cont in OBS status for date of 3/19/19......NADJA

## 2019-03-19 NOTE — PLAN OF CARE
Problem: Adult Inpatient Plan of Care  Goal: Plan of Care Review  PT eval completed. Gait 250ft on RA with SAT 94-96%- no acute PT needs

## 2019-03-19 NOTE — PLAN OF CARE
Problem: Adult Inpatient Plan of Care  Goal: Plan of Care Review  Outcome: Ongoing (interventions implemented as appropriate)   03/19/19 0197   Plan of Care Review   Plan of Care Reviewed With patient   Progress improving   Outcome Summary blood gluose monitored and insulin given    Pt medicated for pain as needed. NAD noted. POC reviewed w pt and they verbalized understanding. Tele-  SR   Pt free from falls, Pt ambulates to the bathroom. Bed in low position, side rails up x 2. Call light in reach,  and wheels locked. Will continue to monitor.  Pt to dialysis today.

## 2019-03-19 NOTE — PROGRESS NOTES
Progress Note  Hospital Medicine  Patient Name:Micheal Hunt  MRN:  [id number]  Patient Class: OP- Observation  Admit Date: 3/18/2019  Length of Stay: 0 days  Expected Discharge Date:   Attending Physician: Charissa Olivas MD  Primary Care Provider:  Pk Lakhani MD    SUBJECTIVE:     Principal Problem: Severe persistent asthma with acute exacerbation  Initial history of present illness:  Micheal Hunt is a 78 y.o. male with HTN, HLD, CAD, LVH, ESRD stage V on dialysis (MW&F), mesenteric ischemia, asthma, and anemia who presents to the ED with acute t onset of difficulty breathing for 2 days. Patient presents to the emergency room department with similar complaints on admission dated 03/11/2019.  He reports he was feeling well when he was discharged from the hospital on March 15, 2019.  He went to dialysis straight from the hospital upon discharge and tolerated well.  He has reports increased shortness of breath dizziness and generalized weakness after dialysis.  He presents to the emergency room with complaints of shortness of breath, dizziness and weakness.  He reports he has been taking his nebulizers at home without any improvement of his shortness of breath. The patient called his Pulmonologist's office this morning and was referred to the ER for further evaluation. He was admitted 1 week ago for PNA on 3/11 and discharged 3 days ago on 3/15 to follow-up with his Pulmonologist Dr. Mihir Guillen on 3/22  Laboratory data and emergency room reviewed.  BNP has improved since previous admission.  He states he is to have dialysis this morning and came to the hospital instead of dialysis due to feeling ill.    PMH/PSH/SH/FH/Meds: reviewed.    Symptoms/Review of Systems:  Patient continues to complain of shortness of breath associated with wheezing.  Patient is in respiratory distress and not able to speak full sentences.  No chest pain or headache, fever or abdominal pain.     Diet:  Adequate intake.    Activity  level:  Up with assist  Pain:  Patient reports no pain.       OBJECTIVE:   Vital Signs (Most Recent):      Temp: 97.4 °F (36.3 °C) (03/19/19 0714)  Pulse: 75 (03/19/19 1107)  Resp: 16 (03/19/19 1107)  BP: (!) 150/84 (03/19/19 1107)  SpO2: 96 % (03/19/19 1107)       Vital Signs Range (Last 24H):  Temp:  [96.2 °F (35.7 °C)-98 °F (36.7 °C)]   Pulse:  [66-97]   Resp:  [16-20]   BP: (140-188)/()   SpO2:  [95 %-100 %]     Weight: 107.9 kg (237 lb 14 oz)  Body mass index is 31.38 kg/m².    Intake/Output Summary (Last 24 hours) at 3/19/2019 1116  Last data filed at 3/19/2019 0753  Gross per 24 hour   Intake 910 ml   Output 4275 ml   Net -3365 ml     Physical Examination:  General appearance: well developed, appears stated age  Head: normocephalic, atraumatic  Eyes:  conjunctivae/corneas clear. PERRL.  Nose: Nares normal. Septum midline.  Throat: lips, mucosa, and tongue normal; teeth and gums normal, no throat erythema.  Neck: supple, symmetrical, trachea midline, no JVD and thyroid not enlarged, symmetric, no tenderness/mass/nodules  Lungs:  Decreased air movement bilaterally, scattered rhonchi bilaterally.  Chest wall: no tenderness  Heart: regular rate and rhythm, S1, S2 normal, no murmur, click, rub or gallop  Abdomen: soft, non-tender non-distented; bowel sounds normal; no masses,  no organomegaly  Extremities: no cyanosis, clubbing or edema.   Pulses: 2+ and symmetric  Skin: Skin color, texture, turgor normal. No rashes or lesions.  Lymph nodes: Cervical, supraclavicular, and axillary nodes normal.  Neurologic: Normal strength and tone. No focal numbness or weakness. CNII-XII intact.      CBC:  Recent Labs   Lab 03/13/19  0428 03/18/19  1004 03/19/19 0428   WBC 9.60 8.80 7.00   RBC 3.37* 3.44* 3.44*   HGB 10.5* 10.7* 10.7*   HCT 31.6* 32.6* 32.5*   * 106* 109*   MCV 94 95 95   MCH 31.1* 31.1* 31.1*   MCHC 33.1 32.8 33.0   [facility]  Recent Labs   Lab 03/13/19  0428 03/14/19  0622  03/15/19  0617 03/18/19  1004  03/18/19  1606 03/19/19  0428   * 309*   < > 271* 366* 208* 286*   * 134*  --  135* 133* 137 136   K 4.2 4.9  --  4.9 4.6 4.7 5.0   CL 95 98  --  100 99 99 102   CO2 21* 25  --  23 21* 28 21*   BUN 89* 79*  --  100* 99* 65* 78*   CREATININE 5.0* 4.4*  --  5.1* 5.7* 4.0* 4.7*   CALCIUM 7.4* 7.7*  --  7.5* 7.4* 8.7 7.9*   MG 2.1 2.4  --  2.4  --   --   --     < > = values in this interval not displayed.     Lab Results   Component Value Date    INR 3.3 (H) 03/19/2019    INR 2.0 (H) 03/18/2019    INR 1.8 (H) 03/15/2019     Diagnostic Results:  Microbiology Results (last 7 days)     ** No results found for the last 168 hours. **         Chest X-Ray: Mild pulmonary infiltrates favoring pneumonia. Cardiomegaly, atherosclerosis and prior sternotomy.    Assessment/Plan:     * Asthma exacerbation  Community-acquired pneumonia     Supplemental O2 via nasal canula; titrate O2 saturation to >92%.   On oral prednisone 40 mg daily.  Follow Pulmonary recommendation.  Continue beta 2 agonist bronchodilator treatments.   Continue PO antibiotics - azithromycin.  Check sputum GS and Cx.   Continue routine medications as before.              End-stage renal     Nephrology consulted. Continue Chronic hemodialysis. Monitor daily electrolytes and defer dialysis orders to nephrology.      Anemia of chronic disease due to end-stage renal disease     Chronic problem. Will continue chronic medications and monitor for any changes, adjusting as needed.      Benign prostatic hyperplasia without lower urinary tract symptoms     Chronic problem.  Continue Flomax and Proscar.      Gout, arthritis     Chronic. Continue Allopurinol.      CAD (coronary artery disease), 2V CABG 2007     History of CAD s/p CABG.  No s/s of angina.  Continue ASA, Statin, and BB.  Telemetry monitoring.  Monitor for s/s of ACS.  Follow Cardiology recommendations       Hyperlipidemia, baseline      Chronic problem. Continue Statin therapy.                 Lab Results   Component Value Date     LDLCALC 46.4 (L) 07/26/2018       Essential hypertension     Chronic problem. Controlled. Continue home medications and monitor b/p closely, adjusting as necessary.     VTE Risk Mitigation (From admission, onward)        Ordered     warfarin tablet 7.5 mg  Daily      03/18/19 1330     IP VTE HIGH RISK PATIENT  Once      03/18/19 1330     Reason for no Mechanical VTE Prophylaxis  Once      03/18/19 1330        Charissa Olivas MD  Department of Hospital Medicine   [facility]

## 2019-03-19 NOTE — PLAN OF CARE
The pt was admitted 3/11/19-3/15/19 and was discharged home with HH ordered however not set up. The pt lives at home alone and denies DME. He goes to [facility] [facility] on MWF. The pt verified his PCP as Dr. Burnett and insurance as Medicare and [employer]. The pt will discharge home with new HH services at the time of discharge . CM will continue to follow. Viktoria Cm LMSW     03/19/19 1013   Discharge Assessment   Assessment Type Discharge Planning Assessment   Confirmed/corrected address and phone number on facesheet? Yes   Assessment information obtained from? Patient;Medical Record   Communicated expected length of stay with patient/caregiver no   Prior to hospitilization cognitive status: Alert/Oriented   Prior to hospitalization functional status: Independent   Current cognitive status: Alert/Oriented   Current Functional Status: Independent   Lives With alone   Able to Return to Prior Arrangements yes   Is patient able to care for self after discharge? Yes   Readmission Within the Last 30 Days current reason for admission unrelated to previous admission   If yes, most recent facility name: [facility] 3/11/19-3/15/19   Patient currently being followed by outpatient case management? No   Patient currently receives any other outside agency services? No   Equipment Currently Used at Home none   Do you have any problems affording any of your prescribed medications? No   Is the patient taking medications as prescribed? yes   Does the patient have transportation home? Yes   Transportation Anticipated family or friend will provide   Dialysis Name and Scheduled days [facility]    Does the patient receive services at the [facility]? No   Discharge Plan A Home Health;Home   Discharge Plan B Home Health;Home with family   DME Needed Upon Discharge  none   Patient/Family in Agreement with Plan yes   Readmission Questionnaire   At the time of your discharge, did someone talk to you about what your health  problems were? Yes   At the time of discharge, did someone talk to you about what to watch out for regarding worsening of your health problem? Yes   At the time of discharge, did someone talk to you about what to do if you experienced worsening of your health problem? Yes   At the time of discharge, did someone talk to you about which medication to take when you left the hospital and which ones to stop taking? Yes   At the time of discharge, did someone talk to you about when and where to follow up with a doctor after you left the hospital? Yes   How often do you need to have someone help you when you read instructions, pamphlets, or other written material from your doctor or pharmacy? Rarely   Do you have problems taking your medications as prescribed? No   Do you have any problems affording any of  your prescribed medications? No   Do you have problems obtaining/receiving your medications? No   Does the patient have transportation to healthcare appointments? Yes   Living Arrangements house   Does the patient have family/friends to help with healtcare needs after discharge? yes   Does your caregiver provide all the help you need? Yes   Are you currently feeling confused? No   Are you currently having problems thinking? No   Are you currently having memory problems? No   Have you felt down, depressed, or hopeless? 0   Have you felt little interest or pleasure in doing things? 0   In the last 7 days, my sleep quality was: fair

## 2019-03-19 NOTE — CONSULTS
Consult Note  Nephrology    Consult Requested By: Charissa Olivas MD    Reason for Consult: ESRD, dependent on dialysis    SUBJECTIVE:     History of Present Illness:  77 y/o male patient presented to ER today with c/o chest pain, SOB, dizziness.  He has out patient HD in [place] on MWF, and denies missing any treatments.  States he can only tolerate about 2L pulled without cramping.  Renal is consulted for dialysis orders.    3/18  CXR shows pulmonary vascular distention.  Pt was seen in dialysis today, has only had about 300cc UF so far, so still symptomatic.  Has mild pedal edema, .    3.19  Still coughing and dyspnea with exertion.  No chest pain    Asessment/plan:    1.  ESRD--s/p hd yesterday.  Isolated uf today.  MWF hd with UF.   UF goal 3L.   2.  Fluid volume overload--UF as above  3.  Anemia of chronic dz--Hgb >10, no need for epo at present time  4.  Mild hyponatremia--will correct with removal of fluid  5.  SHPT--no binders on med list, Ca+ low--but no albumin level.  Will add on CMP and phos.    Past Medical History:   Diagnosis Date    NICK (acute kidney injury) 7/29/2016    Allergy     Anemia, mild 12/15/2014    Arthritis     Gout    Benign essential HTN 3/27/2012    BMI 29.0-29.9,adult 5/10/2018    BPH (benign prostatic hyperplasia)     BPH (benign prostatic hyperplasia)     CAD (coronary artery disease) 2006    Chronic kidney disease     due to ibuprofen    Colon polyp     CRF (chronic renal failure), stage 5     Diverticulosis     Gastritis     GERD (gastroesophageal reflux disease)     Gout     History of colon polyps 5/3/2018    HTN (hypertension) 3/27/2012    Hyperlipidemia     Hyperlipidemia     LLL pneumonia 6/14/2018    LVH (left ventricular hypertrophy)     Mesenteric ischemia     Murmur, cardiac 3/27/2012    GRICELDA (obstructive sleep apnea)     DOES NOT USE A MACHINE    Sinus problem     Syncope and collapse      Past Surgical History:   Procedure Laterality Date     APPENDECTOMY      BLOCK-NERVE Left 2016    [facility]    CARDIAC CATHETERIZATION      COLONOSCOPY      COLONOSCOPY N/A 5/3/2018    [facility]    COLONOSCOPY N/A 9/10/2015    [facility] at [facility]    CORONARY ARTERY BYPASS GRAFT  4/2007    x 1    CYSTOSCOPY N/A 2017    [facility]    CYSTOSCOPY N/A 11/10/2015    [facility] at [facility]    ESOPHAGOGASTRODUODENOSCOPY (EGD) N/A 2016    [facility] at [facility] ([facility])    ESOPHAGOGASTRODUODENOSCOPY (EGD) N/A 10/15/2014    [facility] by [facility] at [facility] ENDO    INJECTION-STEROID-EPIDURAL-TRANSFORAMINAL Left 2016    [facility]    JOINT REPLACEMENT      left knee total replacement  X 3    mid leftt finger      from a cactuss    MOLE REMOVAL      RADIOFREQUENCY THERMOCOAGULATION (RFTC)-NERVE-MEDIAN BRANCH-LUMBAR Left 2016    [facility]    rotative cuff      no rotative cuffs on bilat shoulders has pins     SHOULDER SURGERY      shoulder surgery bilat  RIGHT X 4; LEFT X 3    TRANSRECTAL ULTRASOUND GUIDED PROSTATE BIOPSY Bilateral 11/10/2015    [facility] at [facility]    ULTRASOUND-ENDOSCOPIC-UPPER N/A 2017    [facility] (2ND FLR)    ULTRASOUND-ENDOSCOPIC-UPPER N/A 2016    Performed by [facility] at [facility] ENDO (2ND FLR)    ULTRASOUND-ENDOSCOPIC-UPPER N/A 2015    Performed by Jason [facility]em [facility] at [facility] ENDO (2ND FLR)     Family History   Problem Relation Age of Onset    Heart disease Mother     Sudden death Father     Cancer Father         advanced lung ca- found after 2 story fall    Stroke Sister     Pneumonia Sister          from PNA    Heart disease Sister     Hypertension Brother     Kidney cancer Neg  "Hx     Prostate cancer Neg Hx     Urolithiasis Neg Hx     Allergic rhinitis Neg Hx     Allergies Neg Hx     Angioedema Neg Hx     Asthma Neg Hx     Atopy Neg Hx     Eczema Neg Hx     Immunodeficiency Neg Hx     Rhinitis Neg Hx     Urticaria Neg Hx      Social History     Tobacco Use    Smoking status: Never Smoker    Smokeless tobacco: Never Used   Substance Use Topics    Alcohol use: No    Drug use: No       Review of patient's allergies indicates:   Allergen Reactions    Ace inhibitors Other (See Comments)     Cough    Arb-angiotensin receptor antagonist Itching    Eplerenone Other (See Comments)     Marked bradycardia, 40, tiredness and weakness      Sulfa (sulfonamide antibiotics) Itching     Patient says this was 10 years ago and doesn't remember what happened        Review of Systems:  General ROS: negative for - fever or night sweats  Psychological ROS: negative for - behavioral disorder or depression  ENT ROS: negative for - headaches or visual changes  Hematological and Lymphatic ROS: negative for - bleeding problems or bruising  Endocrine ROS: negative for - temperature intolerance or unexpected weight changes  Respiratory ROS: positive for cough, SOB and wheezing  Cardiovascular ROS: chest pain "burning"  Gastrointestinal ROS: no abdominal pain, change in bowel habits  Musculoskeletal ROS: negative for - joint pain or joint swelling  Neurological ROS: dizziness  Dermatological ROS: negative for rash and skin lesion changes    OBJECTIVE:     Vital Signs Range (Last 24H):  Temp:  [96.2 °F (35.7 °C)-98 °F (36.7 °C)]   Pulse:  [66-97]   Resp:  [16-20]   BP: (140-188)/()   SpO2:  [95 %-100 %]     Physical Exam:  General- Patient alert and oriented x3 in NAD  HEENT- WNL  Neck- supple  CV- Regular rate and rhythm,   Resp- bilat wheezing, rhonchi/crackles  GI- Non tender/non-distended  Extrem- mildly edemetous  Derm- w/d  Neuro-  No flap. No focal deficits noted.     Body mass index is " 31.38 kg/m².    Laboratory:  CBC:   Recent Labs   Lab 03/19/19  0428   WBC 7.00   RBC 3.44*   HGB 10.7*   HCT 32.5*   *   MCV 95   MCH 31.1*   MCHC 33.0     CMP:   Recent Labs   Lab 03/18/19  1606 03/19/19  0428   * 286*   CALCIUM 8.7 7.9*   ALBUMIN 3.8  --    PROT 6.7  --     136   K 4.7 5.0   CO2 28 21*   CL 99 102   BUN 65* 78*   CREATININE 4.0* 4.7*   ALKPHOS 107  --    *  --    AST 39  --    BILITOT 0.7  --        Diagnostic Results:  Labs: Reviewed  X-Ray: Reviewed        ASSESSMENT/PLAN:     Active Hospital Problems    Diagnosis  POA    *Severe persistent asthma with acute exacerbation [J45.51]  Yes    Acute on chronic diastolic congestive heart failure [I50.33]  Yes    Type 2 diabetes mellitus, without long-term current use of insulin [E11.9]  Yes    Hypertension due to end stage renal disease caused by type 2 diabetes mellitus, on dialysis [E11.22, I12.0, Z99.2, N18.6]  Not Applicable    Pulmonary hypertension [I27.20]  Yes    Persistent atrial fibrillation [I48.1]  Yes    ESRD (end stage renal disease) [N18.6]  Yes    Immune deficiency disorder [D84.9]  Yes    Secondary hyperparathyroidism [N25.81]  Yes    Anemia due to chronic kidney disease, on chronic dialysis [N18.6, D63.1, Z99.2]  Not Applicable    Vertigo [R42]  Yes      Resolved Hospital Problems   No resolved problems to display.         Thank you for allowing us to participate in the care of your patient. We will follow the patient and provide recommendations as needed.      Time spent seeing patient( greater than 1/2 spent in direct contact) :     Frankie Rojo MD  Nephrology  [facility]  [phone]

## 2019-03-19 NOTE — PT/OT/SLP EVAL
Physical Therapy Evaluation and Discharge Note    Patient Name:  Micheal Hunt   MRN:  [id number]    Recommendations:     Discharge Recommendations:  home   Discharge Equipment Recommendations: none   Barriers to discharge: None    Assessment:     Micheal Hunt is a 78 y.o. male admitted with a medical diagnosis of Severe persistent asthma with acute exacerbation. .  At this time, patient is functioning at their prior level of function and does not require further acute PT services.   Pt did well ambulating at hallways 250ft with no deficits. SAT on RA 94-96%    Recent Surgery: * No surgery found *      Plan:     During this hospitalization, patient does not require further acute PT services.  Please re-consult if situation changes.      Subjective   Pt stated is active at home and walks long distance to the barn  Chief Complaint: none  Patient/Family Comments/goals: get better  Pain/Comfort:  · Pain Rating 1: 0/10    Patients cultural, spiritual, Che[employer]ism conflicts given the current situation:      Living Environment:  Home with spouse  Prior to admission, patients level of function was independent.  Equipment used at home: none.  DME owned (not currently used): none.  Upon discharge, patient will have assistance from family.    Objective:     Communicated with nurse Yanet prior to session.  Patient found supine upon PT entry to room found with: oxygen, telemetry     General Precautions: Standard,     Orthopedic Precautions:N/A   Braces: N/A     Exams:  · Postural Exam:  Patient presented with the following abnormalities:    · -       No postural abnormalities identified  · RLE ROM: WFL  · RLE Strength: WFL  · LLE ROM: WFL  · LLE Strength: WFL    Functional Mobility:  · Bed Mobility:     · Rolling Right: independence  · Supine to Sit: independence  · Sit to Supine: independence  · Transfers:     · Sit to Stand:  independence with no AD  · Gait: 250ft on RA with no deficits indep    AM-PAC 6 CLICK  MOBILITY  Total Score:24            AM-PAC 6 CLICK MOBILITY  Total Score:24     Patient left HOB elevated with all lines intact, call button in reach and nurse Yanet notified.    GOALS:   Multidisciplinary Problems     Physical Therapy Goals     Not on file                History:     Past Medical History:   Diagnosis Date    NICK (acute kidney injury) 7/29/2016    Allergy     Anemia, mild 12/15/2014    Arthritis     Gout    Benign essential HTN 3/27/2012    BMI 29.0-29.9,adult 5/10/2018    BPH (benign prostatic hyperplasia)     BPH (benign prostatic hyperplasia)     CAD (coronary artery disease) 2006    Chronic kidney disease     due to ibuprofen    Colon polyp     CRF (chronic renal failure), stage 5     Diverticulosis     Gastritis     GERD (gastroesophageal reflux disease)     Gout     History of colon polyps 5/3/2018    HTN (hypertension) 3/27/2012    Hyperlipidemia     Hyperlipidemia     LLL pneumonia 6/14/2018    LVH (left ventricular hypertrophy)     Mesenteric ischemia     Murmur, cardiac 3/27/2012    GRICELDA (obstructive sleep apnea)     DOES NOT USE A MACHINE    Sinus problem     Syncope and collapse        Past Surgical History:   Procedure Laterality Date    APPENDECTOMY      BLOCK-NERVE Left 5/31/2016    [facility]    CARDIAC CATHETERIZATION      COLONOSCOPY  2011    COLONOSCOPY N/A 5/3/2018    [facility]    COLONOSCOPY N/A 9/10/2015    [facility]    CORONARY ARTERY BYPASS GRAFT  4/2007    x 1    CYSTOSCOPY N/A 8/30/2017    [facility]y [facility]nig [facility] at [facility]    CYSTOSCOPY N/A 11/10/2015    [facility]    ESOPHAGOGASTRODUODENOSCOPY (EGD) N/A 1/4/2016    [facility] ([facility]    ESOPHAGOGASTRODUODENOSCOPY (EGD) N/A 10/15/2014    [facility] at [facility]     INJECTION-STEROID-EPIDURAL-TRANSFORAMINAL Left 2/25/2016    [facility]    JOINT REPLACEMENT      left knee total replacement  X 3    mid leftt finger      from a cactuss    MOLE REMOVAL  2016    RADIOFREQUENCY THERMOCOAGULATION (RFTC)-NERVE-MEDIAN BRANCH-LUMBAR Left 4/11/2016    [facility]    rotative cuff      no rotative cuffs on bilat shoulders has pins     SHOULDER SURGERY      shoulder surgery bilat  RIGHT X 4; LEFT X 3    TRANSRECTAL ULTRASOUND GUIDED PROSTATE BIOPSY Bilateral 11/10/2015    [facility]    ULTRASOUND-ENDOSCOPIC-UPPER N/A 5/31/2017    [facility] (2ND FLR)    ULTRASOUND-ENDOSCOPIC-UPPER N/A 1/4/2016    [facility] Tra [facility] (2ND FLR)    ULTRASOUND-ENDOSCOPIC-UPPER N/A 1/16/2015    Performed by Jason [facility]em [facility] at [facility] END[facility] (2ND FLR)       Time Tracking:     PT Received On: 03/19/19  PT Start Time: 1015     PT Stop Time: 1023  PT Total Time (min): 8 min     Billable Minutes: Evaluation 8      Malaika Cerrato, PT  03/19/2019

## 2019-03-19 NOTE — CONSULTS
[facility]  Cardiology  Consult Note    Patient Name: Micheal Hunt  MRN: [id number]  Admission Date: 3/18/2019  Hospital Length of Stay: 0 days  Code Status: Full Code   Attending Provider: Dr Olivas  Consulting Provider: ROSALBA Keller  Primary Care Physician: Pk Lakhani MD  Principal Problem:Severe persistent asthma with acute exacerbation    Patient information was obtained from patient, past medical records and ER records.     Inpatient consult to Cardiology  Consult performed by: Nate Hidalgo MD  Consult ordered by: Bell Wilkinson NP       For Cardiology to render an opinion in regard to CHF.  Subjective:     Chief Complaint:  SOB    HPI:  Micheal Hunt is a 78 y.o. male with HTN, HLD, CAD, LVH, ESRD stage V on dialysis (MW&F), mesenteric ischemia, asthma, and anemia who presents to the ED with acute t onset of difficulty breathing for 2 days. He states he was not well enough to be discharged on the 15 th and has not been feeling well since then. He states he has been very SOB. His dialysis days are MWF. BNP is 834.    Past Medical History:   Diagnosis Date    NICK (acute kidney injury) 7/29/2016    Allergy     Anemia, mild 12/15/2014    Arthritis     Gout    Benign essential HTN 3/27/2012    BMI 29.0-29.9,adult 5/10/2018    BPH (benign prostatic hyperplasia)     BPH (benign prostatic hyperplasia)     CAD (coronary artery disease) 2006    Chronic kidney disease     due to ibuprofen    Colon polyp     CRF (chronic renal failure), stage 5     Diverticulosis     Gastritis     GERD (gastroesophageal reflux disease)     Gout     History of colon polyps 5/3/2018    HTN (hypertension) 3/27/2012    Hyperlipidemia     Hyperlipidemia     LLL pneumonia 6/14/2018    LVH (left ventricular hypertrophy)     Mesenteric ischemia     Murmur, cardiac 3/27/2012    GRICELDA (obstructive sleep apnea)     DOES NOT USE A MACHINE    Sinus problem     Syncope and  collapse        Past Surgical History:   Procedure Laterality Date    APPENDECTOMY      BLOCK-NERVE Left 5/31/2016    [facility]formed by [facility] OR    CARDIAC CATHETERIZATION      COLONOSCOPY  2011    COLONOSCOPY N/A 5/3/2018    [facility] at [facility]    COLONOSCOPY N/A 9/10/2015    [facility] at [facility] END[facility]    CORONARY ARTERY BYPASS GRAFT  4/2007    x 1    CYSTOSCOPY N/A 8/30/2017    [facility]formed by [facility]ring [facility] at [facility] OR    CYSTOSCOPY N/A 11/10/2015    Performed by [facility] at [facility] OR    ESOPHAGOGASTRODUODENOSCOPY (EGD) N/A 1/4/2016    Performed by Tra [facility] at [facility] (2ND FLR)    ESOPHAGOGASTRODUODENOSCOPY (EGD) N/A 10/15/2014    Performed by Messi Harris MD at [facility] ENDO    INJECTION-STEROID-EPIDURAL-TRANSFORAMINAL Left 2/25/2016    Performed by [facility] at [facility] OR    JOINT REPLACEMENT      left knee total replacement  X 3    mid leftt finger      from a cactuss    MOLE REMOVAL  2016    RADIOFREQUENCY THERMOCOAGULATION (RFTC)-NERVE-MEDIAN BRANCH-LUMBAR Left 4/11/2016    Performed by [facility] at [facility] OR    rotative cuff      no rotative cuffs on bilat shoulders has pins     SHOULDER SURGERY      shoulder surgery bilat  RIGHT X 4; LEFT X 3    TRANSRECTAL ULTRASOUND GUIDED PROSTATE BIOPSY Bilateral 11/10/2015    Performed by [facility]y [facility]ring [facility] at [facility] OR    ULTRASOUND-ENDOSCOPIC-UPPER N/A 5/31/2017    Performed by Tra Brooks MD at [facility] ENDO (2ND FLR)    ULTRASOUND-ENDOSCOPIC-UPPER N/A 1/4/2016    Performed by Tra Brooks MD at [facility] ENDO (2ND FLR)    ULTRASOUND-ENDOSCOPIC-UPPER N/A 1/16/2015    Performed by Jason Saleem MD at [facility] (2ND FLR)       Review of patient's allergies indicates:   Allergen Reactions    Ace inhibitors Other (See Comments)     Cough    Arb-angiotensin receptor antagonist Itching    Eplerenone Other (See Comments)     Marked bradycardia, 40, tiredness  and weakness      Sulfa (sulfonamide antibiotics) Itching     Patient says this was 10 years ago and doesn't remember what happened       No current facility-administered medications on file prior to encounter.      Current Outpatient Medications on File Prior to Encounter   Medication Sig    albuterol (PROVENTIL/VENTOLIN HFA) 90 mcg/actuation inhaler 2 puffs every 4 hours as needed for cough, wheeze, or shortness of breath    albuterol-ipratropium (DUO-NEB) 2.5 mg-0.5 mg/3 mL nebulizer solution Take 3 mLs by nebulization every 6 (six) hours as needed for Wheezing or Shortness of Breath.    allopurinol (ZYLOPRIM) 100 MG tablet TAKE 1 TABLET BY MOUTH EVERY DAY    atorvastatin (LIPITOR) 80 MG tablet TAKE 1 TABLET (80 MG TOTAL) BY MOUTH ONCE DAILY.    azithromycin (Z-JEROD) 250 MG tablet Take 2 tablets (500 mg total) by mouth once daily. for 5 days    budesonide-formoterol 160-4.5 mcg (SYMBICORT) 160-4.5 mcg/actuation HFAA Inhale 2 puffs into the lungs every 12 (twelve) hours. Controller    carvedilol (COREG) 6.25 MG tablet Take 1 tablet (6.25 mg total) by mouth 2 (two) times daily with meals.    celecoxib (CELEBREX) 200 MG capsule Take 1 capsule (200 mg total) by mouth once daily.    finasteride (PROSCAR) 5 mg tablet TAKE 1 TABLET ONCE DAILY.    gabapentin (NEURONTIN) 300 MG capsule Take 1 capsule (300 mg total) by mouth every evening.    isosorbide mononitrate (ISMO,MONOKET) 10 mg tablet TAKE 1 TABLET BY MOUTH EVERY DAY IN THE EVENING    losartan (COZAAR) 100 MG tablet TAKE 1 TABLET BY MOUTH EVERY DAY    meclizine (ANTIVERT) 25 mg tablet Take 1 tablet (25 mg total) by mouth 3 (three) times daily as needed for Dizziness.    predniSONE (DELTASONE) 20 MG tablet 3 pills for 3 days, 2 pills for 3 days, 1 pill for 3 days, repeat if needed.    tamsulosin (FLOMAX) 0.4 mg Cap TAKE 1 CAPSULE BY MOUTH EVERY DAY    torsemide (DEMADEX) 20 MG Tab Take 1 tablet (20 mg total) by mouth 2 (two) times daily.    warfarin  (COUMADIN) 7.5 MG tablet Take 1 tablet (7.5 mg total) by mouth Daily.    aspirin (ECOTRIN) 81 MG EC tablet Take 81 mg by mouth once daily.      fluticasone (FLONASE) 50 mcg/actuation nasal spray 2 sprays (100 mcg total) by Each Nare route once daily.    meclizine (ANTIVERT) 25 mg tablet TAKE 1 TABLET BY MOUTH THREE TIMES A DAY AS NEEDED    mirtazapine (REMERON) 7.5 MG Tab TAKE 1 TABLET BY MOUTH EVERY EVENING.    NITROSTAT 0.4 mg SL tablet PLACE 1 TABLET (0.4 MG TOTAL) UNDER THE TONGUE EVERY 5 (FIVE) MINUTES AS NEEDED FOR CHEST PAIN.    omeprazole (PRILOSEC) 20 MG capsule TAKE 1 CAPSULE BY MOUTH EVERY DAY    sodium chloride (SALINE NASAL MIST) 3 % Mist 1 spray by Nasal route 2 (two) times daily.    tiotropium bromide (SPIRIVA RESPIMAT) 1.25 mcg/actuation Mist Inhale 2.5 mcg into the lungs once daily. Controller    zolpidem (AMBIEN) 5 MG Tab TAKE 1 TABLET BY MOUTH EVERY EVENING AS NEEDED    zolpidem (AMBIEN) 5 MG Tab TAKE 1 TABLET BY MOUTH EVERY EVENING AS NEEDED     Family History     Problem Relation (Age of Onset)    Cancer Father    Heart disease Mother, Sister    Hypertension Brother    Pneumonia Sister    Stroke Sister    Sudden death Father        Tobacco Use    Smoking status: Never Smoker    Smokeless tobacco: Never Used   Substance and Sexual Activity    Alcohol use: No    Drug use: No    Sexual activity: Not on file     Review of Systems   Constitution: Positive for weakness.   HENT: Negative.    Cardiovascular: Positive for dyspnea on exertion.   Respiratory: Positive for cough, shortness of breath and wheezing.    Skin: Negative.    Musculoskeletal: Negative.    Gastrointestinal: Negative.    Genitourinary: Negative.    Psychiatric/Behavioral: Negative.      Objective:     Vital Signs (Most Recent):  Temp: 97.4 °F (36.3 °C) (03/19/19 0714)  Pulse: 97 (03/19/19 0824)  Resp: 20 (03/19/19 0824)  BP: (!) 188/87 (03/19/19 0714)  SpO2: 99 % (03/19/19 0824) Vital Signs (24h Range):  Temp:  [96.2  °F (35.7 °C)-98 °F (36.7 °C)] 97.4 °F (36.3 °C)  Pulse:  [66-97] 97  Resp:  [16-28] 20  SpO2:  [94 %-100 %] 99 %  BP: (140-188)/() 188/87     Weight: 107.9 kg (237 lb 14 oz)  Body mass index is 31.38 kg/m².    SpO2: 99 %  O2 Device (Oxygen Therapy): room air      Intake/Output Summary (Last 24 hours) at 3/19/2019 0917  Last data filed at 3/19/2019 0753  Gross per 24 hour   Intake 910 ml   Output 4275 ml   Net -3365 ml       Lines/Drains/Airways     Drain                 Hemodialysis AV Fistula Left upper arm -- days          Peripheral Intravenous Line                 Peripheral IV - Single Lumen 03/18/19 1207 Right Wrist less than 1 day                Physical Exam   Constitutional: He is oriented to person, place, and time. He appears well-developed and well-nourished.   HENT:   Head: Normocephalic.   Eyes: Conjunctivae are normal. Pupils are equal, round, and reactive to light.   Neck: Normal range of motion. Neck supple.   Cardiovascular: Normal rate. An irregular rhythm present.   Murmur heard.  Pulmonary/Chest: Effort normal. He has wheezes. He has rales.   Abdominal: Soft. Bowel sounds are normal.   Musculoskeletal: Normal range of motion. He exhibits edema.   Neurological: He is alert and oriented to person, place, and time.   Skin: Skin is warm and dry.   Psychiatric: He has a normal mood and affect. His behavior is normal. Thought content normal.       Significant Labs:   CMP   Recent Labs   Lab 03/18/19  1004 03/18/19  1606 03/19/19  0428   * 137 136   K 4.6 4.7 5.0   CL 99 99 102   CO2 21* 28 21*   * 208* 286*   BUN 99* 65* 78*   CREATININE 5.7* 4.0* 4.7*   CALCIUM 7.4* 8.7 7.9*   PROT  --  6.7  --    ALBUMIN  --  3.8  --    BILITOT  --  0.7  --    ALKPHOS  --  107  --    AST  --  39  --    ALT  --  126*  --    ANIONGAP 13 10 13   ESTGFRAFRICA 10* 15* 13*   EGFRNONAA 9* 13* 11*   , CBC   Recent Labs   Lab 03/18/19  1004 03/19/19  0428   WBC 8.80 7.00   HGB 10.7* 10.7*   HCT 32.6* 32.5*    * 109*   , INR   Recent Labs   Lab 03/18/19  1004   INR 2.0*   , Lipid Panel No results for input(s): CHOL, HDL, LDLCALC, TRIG, CHOLHDL in the last 48 hours. and Troponin No results for input(s): TROPONINI in the last 48 hours.    Significant Imaging:   C Xray - Mildly prominent perihilar markings suggesting mild pulmonary vascular distention.   Echo 03/12/2019  · Mild left ventricular enlargement. Normal left ventricular systolic function. The estimated ejection fraction is 55%  · Atrial fibrillation observed with restrictive filling pattern present.  · Severe right ventricular enlargement. Normal right ventricular systolic function.  · Severe left atrial enlargement.  · Severe right atrial enlargement.  · Mild aortic valve stenosis (although calculated PEARL within normal range; mean gradient 17mmHg, peak velocity 2.9m/s). Mild aortic regurgitation.  · Mild mitral regurgitation.  · Mild tricuspid regurgitation.  · Pulmonary hypertension present. The estimated PA systolic pressure is 50 mm Hg  · The estimated PA systolic pressure is 50 mm Hg  Assessment and Plan:     Severe persistant Asthma  HTN  DM 2  Acute on chronic CHF  ESRD  Anemia  PAF  GRICELDA (untreated)  Continued angina- ? Of luis to lad steal by av shunt . Pt may need recath - will assess fully in am     Continue ASA and statin. Coumadin  Tosremide 20mg po bid; low salt and fluid restriction; routine HD. INR 2.0.  May consider cardioverting pt.    Active Diagnoses:    Diagnosis Date Noted POA    PRINCIPAL PROBLEM:  Severe persistent asthma with acute exacerbation [J45.51] 02/21/2018 Yes    Acute on chronic diastolic congestive heart failure [I50.33] 03/18/2019 Yes    Type 2 diabetes mellitus, without long-term current use of insulin [E11.9] 03/18/2019 Yes    Hypertension due to end stage renal disease caused by type 2 diabetes mellitus, on dialysis [E11.22, I12.0, Z99.2, N18.6] 03/18/2019 Not Applicable    Pulmonary hypertension [I27.20]  03/18/2019 Yes    Persistent atrial fibrillation [I48.1] 03/12/2019 Yes    ESRD (end stage renal disease) [N18.6] 03/12/2019 Yes    Immune deficiency disorder [D84.9] 03/11/2019 Yes    Secondary hyperparathyroidism [N25.81] 08/29/2018 Yes    Anemia due to chronic kidney disease, on chronic dialysis [N18.6, D63.1, Z99.2] 02/25/2018 Not Applicable    Vertigo [R42] 03/27/2012 Yes      Problems Resolved During this Admission:       VTE Risk Mitigation (From admission, onward)        Ordered     warfarin tablet 7.5 mg  Daily      03/18/19 1330     IP VTE HIGH RISK PATIENT  Once      03/18/19 1330     Reason for no Mechanical VTE Prophylaxis  Once      03/18/19 1330          Thank you for your consult.     Janine Umanzor, FNP  Cardiology   [facility]

## 2019-03-20 PROBLEM — R06.02 SOB (SHORTNESS OF BREATH): Status: ACTIVE | Noted: 2019-03-20

## 2019-03-20 PROBLEM — I20.0 UNSTABLE ANGINA: Status: ACTIVE | Noted: 2019-03-20

## 2019-03-20 LAB
ANION GAP SERPL CALC-SCNC: 13 MMOL/L
BASOPHILS # BLD AUTO: 0 K/UL
BASOPHILS NFR BLD: 0 %
BUN SERPL-MCNC: 106 MG/DL
CALCIUM SERPL-MCNC: 7.7 MG/DL
CHLORIDE SERPL-SCNC: 98 MMOL/L
CK MB SERPL-MCNC: 5.3 NG/ML
CK MB SERPL-MCNC: 5.5 NG/ML
CK MB SERPL-RTO: 3.6 %
CK MB SERPL-RTO: 3.8 %
CK SERPL-CCNC: 140 U/L
CK SERPL-CCNC: 151 U/L
CO2 SERPL-SCNC: 20 MMOL/L
CREAT SERPL-MCNC: 5.4 MG/DL
DIFFERENTIAL METHOD: ABNORMAL
EOSINOPHIL # BLD AUTO: 0 K/UL
EOSINOPHIL NFR BLD: 0 %
ERYTHROCYTE [DISTWIDTH] IN BLOOD BY AUTOMATED COUNT: 18.2 %
EST. GFR  (AFRICAN AMERICAN): 11 ML/MIN/1.73 M^2
EST. GFR  (NON AFRICAN AMERICAN): 9 ML/MIN/1.73 M^2
GLUCOSE SERPL-MCNC: 469 MG/DL
HCT VFR BLD AUTO: 33 %
HGB BLD-MCNC: 10.8 G/DL
INR PPP: 4.2
LYMPHOCYTES # BLD AUTO: 0.4 K/UL
LYMPHOCYTES NFR BLD: 4.1 %
MCH RBC QN AUTO: 31.2 PG
MCHC RBC AUTO-ENTMCNC: 32.7 G/DL
MCV RBC AUTO: 95 FL
MONOCYTES # BLD AUTO: 0.2 K/UL
MONOCYTES NFR BLD: 2.4 %
NEUTROPHILS # BLD AUTO: 8.3 K/UL
NEUTROPHILS NFR BLD: 93.5 %
PLATELET # BLD AUTO: 100 K/UL
PMV BLD AUTO: 8.6 FL
POCT GLUCOSE: 245 MG/DL (ref 70–110)
POCT GLUCOSE: 285 MG/DL (ref 70–110)
POCT GLUCOSE: 388 MG/DL (ref 70–110)
POCT GLUCOSE: 400 MG/DL (ref 70–110)
POCT GLUCOSE: 408 MG/DL (ref 70–110)
POCT GLUCOSE: 428 MG/DL (ref 70–110)
POTASSIUM SERPL-SCNC: 5 MMOL/L
PROTHROMBIN TIME: 42.2 SEC
RBC # BLD AUTO: 3.47 M/UL
SODIUM SERPL-SCNC: 131 MMOL/L
T4 FREE SERPL-MCNC: 0.7 NG/DL
TROPONIN I SERPL DL<=0.01 NG/ML-MCNC: 0.07 NG/ML
TROPONIN I SERPL DL<=0.01 NG/ML-MCNC: 0.08 NG/ML
TSH SERPL DL<=0.005 MIU/L-ACNC: 0.03 UIU/ML
WBC # BLD AUTO: 8.9 K/UL

## 2019-03-20 PROCEDURE — 12000002 HC ACUTE/MED SURGE SEMI-PRIVATE ROOM

## 2019-03-20 PROCEDURE — 80100016 HC MAINTENANCE HEMODIALYSIS

## 2019-03-20 PROCEDURE — 63600175 PHARM REV CODE 636 W HCPCS: Performed by: NURSE PRACTITIONER

## 2019-03-20 PROCEDURE — 97802 MEDICAL NUTRITION INDIV IN: CPT

## 2019-03-20 PROCEDURE — 82550 ASSAY OF CK (CPK): CPT

## 2019-03-20 PROCEDURE — 82553 CREATINE MB FRACTION: CPT

## 2019-03-20 PROCEDURE — 25000003 PHARM REV CODE 250: Performed by: NURSE PRACTITIONER

## 2019-03-20 PROCEDURE — 80048 BASIC METABOLIC PNL TOTAL CA: CPT

## 2019-03-20 PROCEDURE — 85610 PROTHROMBIN TIME: CPT

## 2019-03-20 PROCEDURE — 84443 ASSAY THYROID STIM HORMONE: CPT

## 2019-03-20 PROCEDURE — 63600175 PHARM REV CODE 636 W HCPCS: Performed by: EMERGENCY MEDICINE

## 2019-03-20 PROCEDURE — 25000242 PHARM REV CODE 250 ALT 637 W/ HCPCS: Performed by: NURSE PRACTITIONER

## 2019-03-20 PROCEDURE — 63600175 PHARM REV CODE 636 W HCPCS: Performed by: INTERNAL MEDICINE

## 2019-03-20 PROCEDURE — 94640 AIRWAY INHALATION TREATMENT: CPT

## 2019-03-20 PROCEDURE — 84439 ASSAY OF FREE THYROXINE: CPT

## 2019-03-20 PROCEDURE — 96372 THER/PROPH/DIAG INJ SC/IM: CPT

## 2019-03-20 PROCEDURE — 25000003 PHARM REV CODE 250: Performed by: INTERNAL MEDICINE

## 2019-03-20 PROCEDURE — 25000003 PHARM REV CODE 250: Performed by: HOSPITALIST

## 2019-03-20 PROCEDURE — 99232 SBSQ HOSP IP/OBS MODERATE 35: CPT | Mod: ,,, | Performed by: INTERNAL MEDICINE

## 2019-03-20 PROCEDURE — 99232 PR SUBSEQUENT HOSPITAL CARE,LEVL II: ICD-10-PCS | Mod: ,,, | Performed by: INTERNAL MEDICINE

## 2019-03-20 PROCEDURE — 36415 COLL VENOUS BLD VENIPUNCTURE: CPT

## 2019-03-20 PROCEDURE — 27000221 HC OXYGEN, UP TO 24 HOURS

## 2019-03-20 PROCEDURE — 85025 COMPLETE CBC W/AUTO DIFF WBC: CPT

## 2019-03-20 PROCEDURE — 96376 TX/PRO/DX INJ SAME DRUG ADON: CPT

## 2019-03-20 PROCEDURE — 99900035 HC TECH TIME PER 15 MIN (STAT)

## 2019-03-20 PROCEDURE — 94761 N-INVAS EAR/PLS OXIMETRY MLT: CPT

## 2019-03-20 PROCEDURE — 84484 ASSAY OF TROPONIN QUANT: CPT | Mod: 91

## 2019-03-20 RX ORDER — RAMELTEON 8 MG/1
8 TABLET ORAL NIGHTLY PRN
Status: DISCONTINUED | OUTPATIENT
Start: 2019-03-21 | End: 2019-03-22 | Stop reason: HOSPADM

## 2019-03-20 RX ORDER — TORSEMIDE 20 MG/1
20 TABLET ORAL 2 TIMES DAILY
Status: CANCELLED | OUTPATIENT
Start: 2019-03-20

## 2019-03-20 RX ORDER — IPRATROPIUM BROMIDE AND ALBUTEROL SULFATE 2.5; .5 MG/3ML; MG/3ML
3 SOLUTION RESPIRATORY (INHALATION) EVERY 4 HOURS
Status: CANCELLED | OUTPATIENT
Start: 2019-03-20

## 2019-03-20 RX ORDER — IBUPROFEN 200 MG
24 TABLET ORAL
Status: CANCELLED | OUTPATIENT
Start: 2019-03-20

## 2019-03-20 RX ORDER — IBUPROFEN 200 MG
16 TABLET ORAL
Status: CANCELLED | OUTPATIENT
Start: 2019-03-20

## 2019-03-20 RX ORDER — TAMSULOSIN HYDROCHLORIDE 0.4 MG/1
1 CAPSULE ORAL DAILY
Status: CANCELLED | OUTPATIENT
Start: 2019-03-21

## 2019-03-20 RX ORDER — MECLIZINE HYDROCHLORIDE 25 MG/1
25 TABLET ORAL 3 TIMES DAILY PRN
Status: CANCELLED | OUTPATIENT
Start: 2019-03-20

## 2019-03-20 RX ORDER — MIRTAZAPINE 7.5 MG/1
7.5 TABLET, FILM COATED ORAL NIGHTLY
Status: CANCELLED | OUTPATIENT
Start: 2019-03-20

## 2019-03-20 RX ORDER — POTASSIUM CHLORIDE 20 MEQ/15ML
40 SOLUTION ORAL
Status: CANCELLED | OUTPATIENT
Start: 2019-03-20

## 2019-03-20 RX ORDER — METHYLPREDNISOLONE SOD SUCC 125 MG
80 VIAL (EA) INJECTION EVERY 12 HOURS
Status: DISCONTINUED | OUTPATIENT
Start: 2019-03-20 | End: 2019-03-20

## 2019-03-20 RX ORDER — LOSARTAN POTASSIUM 25 MG/1
100 TABLET ORAL NIGHTLY
Status: CANCELLED | OUTPATIENT
Start: 2019-03-20

## 2019-03-20 RX ORDER — CARVEDILOL 6.25 MG/1
12.5 TABLET ORAL 2 TIMES DAILY WITH MEALS
Status: CANCELLED | OUTPATIENT
Start: 2019-03-20

## 2019-03-20 RX ORDER — INSULIN ASPART 100 [IU]/ML
15 INJECTION, SOLUTION INTRAVENOUS; SUBCUTANEOUS ONCE
Status: COMPLETED | OUTPATIENT
Start: 2019-03-20 | End: 2019-03-20

## 2019-03-20 RX ORDER — LANOLIN ALCOHOL/MO/W.PET/CERES
800 CREAM (GRAM) TOPICAL
Status: CANCELLED | OUTPATIENT
Start: 2019-03-20

## 2019-03-20 RX ORDER — INSULIN ASPART 100 [IU]/ML
10 INJECTION, SOLUTION INTRAVENOUS; SUBCUTANEOUS ONCE
Status: COMPLETED | OUTPATIENT
Start: 2019-03-20 | End: 2019-03-20

## 2019-03-20 RX ORDER — GABAPENTIN 300 MG/1
300 CAPSULE ORAL NIGHTLY
Status: CANCELLED | OUTPATIENT
Start: 2019-03-20

## 2019-03-20 RX ORDER — HEPARIN SODIUM 5000 [USP'U]/ML
5000 INJECTION, SOLUTION INTRAVENOUS; SUBCUTANEOUS EVERY 12 HOURS
Status: DISCONTINUED | OUTPATIENT
Start: 2019-03-20 | End: 2019-03-20

## 2019-03-20 RX ORDER — ALLOPURINOL 100 MG/1
100 TABLET ORAL DAILY
Status: CANCELLED | OUTPATIENT
Start: 2019-03-21

## 2019-03-20 RX ORDER — AZITHROMYCIN 250 MG/1
500 TABLET, FILM COATED ORAL DAILY
Status: CANCELLED | OUTPATIENT
Start: 2019-03-21

## 2019-03-20 RX ORDER — IPRATROPIUM BROMIDE AND ALBUTEROL SULFATE 2.5; .5 MG/3ML; MG/3ML
3 SOLUTION RESPIRATORY (INHALATION) EVERY 6 HOURS PRN
Status: CANCELLED | OUTPATIENT
Start: 2019-03-20

## 2019-03-20 RX ORDER — CARVEDILOL 6.25 MG/1
12.5 TABLET ORAL 2 TIMES DAILY WITH MEALS
Status: DISCONTINUED | OUTPATIENT
Start: 2019-03-20 | End: 2019-03-21

## 2019-03-20 RX ORDER — INSULIN ASPART 100 [IU]/ML
1-10 INJECTION, SOLUTION INTRAVENOUS; SUBCUTANEOUS
Status: CANCELLED | OUTPATIENT
Start: 2019-03-20

## 2019-03-20 RX ORDER — TRAMADOL HYDROCHLORIDE 50 MG/1
50 TABLET ORAL EVERY 8 HOURS PRN
Status: CANCELLED | OUTPATIENT
Start: 2019-03-20

## 2019-03-20 RX ORDER — ASPIRIN 81 MG/1
81 TABLET ORAL DAILY
Status: CANCELLED | OUTPATIENT
Start: 2019-03-21

## 2019-03-20 RX ORDER — ATORVASTATIN CALCIUM 40 MG/1
80 TABLET, FILM COATED ORAL DAILY
Status: CANCELLED | OUTPATIENT
Start: 2019-03-20

## 2019-03-20 RX ORDER — SODIUM CHLORIDE 9 MG/ML
INJECTION, SOLUTION INTRAVENOUS
Status: CANCELLED | OUTPATIENT
Start: 2019-03-20

## 2019-03-20 RX ORDER — FLUTICASONE PROPIONATE 50 MCG
2 SPRAY, SUSPENSION (ML) NASAL DAILY
Status: CANCELLED | OUTPATIENT
Start: 2019-03-21

## 2019-03-20 RX ORDER — FINASTERIDE 5 MG/1
5 TABLET, FILM COATED ORAL DAILY
Status: CANCELLED | OUTPATIENT
Start: 2019-03-21

## 2019-03-20 RX ORDER — GLUCAGON 1 MG
1 KIT INJECTION
Status: CANCELLED | OUTPATIENT
Start: 2019-03-20

## 2019-03-20 RX ORDER — ISOSORBIDE MONONITRATE 10 MG/1
10 TABLET ORAL NIGHTLY
Status: CANCELLED | OUTPATIENT
Start: 2019-03-20

## 2019-03-20 RX ADMIN — INSULIN ASPART 15 UNITS: 100 INJECTION, SOLUTION INTRAVENOUS; SUBCUTANEOUS at 04:03

## 2019-03-20 RX ADMIN — ISOSORBIDE MONONITRATE 10 MG: 10 TABLET ORAL at 08:03

## 2019-03-20 RX ADMIN — GABAPENTIN 300 MG: 300 CAPSULE ORAL at 08:03

## 2019-03-20 RX ADMIN — INSULIN ASPART 5 UNITS: 100 INJECTION, SOLUTION INTRAVENOUS; SUBCUTANEOUS at 08:03

## 2019-03-20 RX ADMIN — IPRATROPIUM BROMIDE AND ALBUTEROL SULFATE 3 ML: .5; 3 SOLUTION RESPIRATORY (INHALATION) at 07:03

## 2019-03-20 RX ADMIN — AZITHROMYCIN 500 MG: 250 TABLET, FILM COATED ORAL at 01:03

## 2019-03-20 RX ADMIN — ATORVASTATIN CALCIUM 80 MG: 40 TABLET, FILM COATED ORAL at 08:03

## 2019-03-20 RX ADMIN — IPRATROPIUM BROMIDE AND ALBUTEROL SULFATE 3 ML: .5; 3 SOLUTION RESPIRATORY (INHALATION) at 03:03

## 2019-03-20 RX ADMIN — IPRATROPIUM BROMIDE AND ALBUTEROL SULFATE 3 ML: .5; 3 SOLUTION RESPIRATORY (INHALATION) at 12:03

## 2019-03-20 RX ADMIN — INSULIN ASPART 4 UNITS: 100 INJECTION, SOLUTION INTRAVENOUS; SUBCUTANEOUS at 01:03

## 2019-03-20 RX ADMIN — LOSARTAN POTASSIUM 100 MG: 25 TABLET ORAL at 08:03

## 2019-03-20 RX ADMIN — TAMSULOSIN HYDROCHLORIDE 0.4 MG: 0.4 CAPSULE ORAL at 01:03

## 2019-03-20 RX ADMIN — FINASTERIDE 5 MG: 5 TABLET, FILM COATED ORAL at 01:03

## 2019-03-20 RX ADMIN — FLUTICASONE PROPIONATE 100 MCG: 50 SPRAY, METERED NASAL at 01:03

## 2019-03-20 RX ADMIN — METHYLPREDNISOLONE SODIUM SUCCINATE 40 MG: 40 INJECTION, POWDER, FOR SOLUTION INTRAMUSCULAR; INTRAVENOUS at 01:03

## 2019-03-20 RX ADMIN — ASPIRIN 81 MG: 81 TABLET, COATED ORAL at 01:03

## 2019-03-20 RX ADMIN — MIRTAZAPINE 7.5 MG: 7.5 TABLET ORAL at 08:03

## 2019-03-20 RX ADMIN — IPRATROPIUM BROMIDE AND ALBUTEROL SULFATE 3 ML: .5; 3 SOLUTION RESPIRATORY (INHALATION) at 04:03

## 2019-03-20 RX ADMIN — TRAMADOL HYDROCHLORIDE 50 MG: 50 TABLET, FILM COATED ORAL at 04:03

## 2019-03-20 RX ADMIN — INSULIN ASPART 10 UNITS: 100 INJECTION, SOLUTION INTRAVENOUS; SUBCUTANEOUS at 08:03

## 2019-03-20 RX ADMIN — TORSEMIDE 20 MG: 20 TABLET ORAL at 08:03

## 2019-03-20 RX ADMIN — TORSEMIDE 20 MG: 20 TABLET ORAL at 01:03

## 2019-03-20 RX ADMIN — METHYLPREDNISOLONE SODIUM SUCCINATE 80 MG: 125 INJECTION, POWDER, FOR SOLUTION INTRAMUSCULAR; INTRAVENOUS at 05:03

## 2019-03-20 RX ADMIN — INSULIN ASPART 8 UNITS: 100 INJECTION, SOLUTION INTRAVENOUS; SUBCUTANEOUS at 05:03

## 2019-03-20 RX ADMIN — IPRATROPIUM BROMIDE AND ALBUTEROL SULFATE 3 ML: .5; 3 SOLUTION RESPIRATORY (INHALATION) at 11:03

## 2019-03-20 RX ADMIN — CARVEDILOL 12.5 MG: 6.25 TABLET, FILM COATED ORAL at 04:03

## 2019-03-20 RX ADMIN — ALLOPURINOL 100 MG: 100 TABLET ORAL at 01:03

## 2019-03-20 NOTE — PLAN OF CARE
Aerosol tx q4.       03/20/19 0733   Patient Assessment/Suction   Level of Consciousness (AVPU) alert   Respiratory Effort Unlabored   Expansion/Accessory Muscles/Retractions expansion symmetric   All Lung Fields Breath Sounds coarse   PRE-TX-O2   SpO2 98 %   Pulse Oximetry Type Intermittent   $ Pulse Oximetry - Multiple Charge Pulse Oximetry - Multiple   Pulse 81   Resp 18   Aerosol Therapy   $ Aerosol Therapy Charges Aerosol Treatment   Respiratory Treatment Status (SVN) given   Treatment Route (SVN) mouthpiece   Patient Position (SVN) Holloway's   Signs of Intolerance (SVN) none   Breath Sounds Post-Respiratory Treatment   Throughout All Fields Post-Treatment no change   Post-treatment Heart Rate (beats/min) 86   Post-treatment Resp Rate (breaths/min) 18

## 2019-03-20 NOTE — PLAN OF CARE
Please call extension [phone] upon patient arrival to floor for Hospital Medicine admit team assignment and for additional admit orders. If patient is coming from another [facility] facility please also call [phone] to inform the admit team/office that patient has arrived from the [facility] facility to the floor so patient can be evaluated.    Outside Transfer Acceptance Note    Transferring Physician: Dr. Hidalgo, Cardiology    Accepting Physician: Salty Aguilar     Date of Acceptance: 03/20/2019     Code Status: FULL    Transferring Facility/Hospital: [facility]    Reason for Transfer: Interventional Cardiology Evaluation    Report from Transferring Physician or Mid-Level provider/Hospital course:   Patient of concern is a 78M ESRD on iHD, essential HTN, mixed HLD, CAD s/p CABG in 200, pAF, mesenteric ischemia initially admitted with worsening SOB and intermittent CP. Recently discharged after treatment for COPD with exacerbation. There is concern for unstable angina, cardiac etiology of symptoms. No cardiac markers were drawn so advised to trend. ACS protocol initiation has not been recommended by cardiology. The case was referred to Dr. Philip Higginbotham prior to my involvement who accepted in transfer for [facility].    Troponin negative  TTE 3/12/2019  · Mild left ventricular enlargement.   · Normal left ventricular systolic function.   · The estimated ejection fraction is 55%  · Atrial fibrillation observed with restrictive filling pattern present.  · Severe right ventricular enlargement.   · Normal right ventricular systolic function.  · Severe left atrial enlargement.  · Severe right atrial enlargement.  · Mild aortic valve stenosis (although calculated PEARL within normal range; mean gradient 17mmHg, peak velocity 2.9m/s). Mild aortic regurgitation.  · Mild mitral regurgitation.  · Mild tricuspid regurgitation.  · Pulmonary hypertension present. The estimated PA systolic pressure is 50 mm Hg  · The estimated PA systolic pressure is 50  mm Hg    To do list upon patient arrival:   - Admit to [facility] if feasible  - ACS w/u  - Consult IC for R&LHC  - C/w management of COPD    ANGELITO RUDOLPH MD  Attending Staff Physician   [facility]  Cell: [phone]

## 2019-03-20 NOTE — ASSESSMENT & PLAN NOTE
Contributing Nutrition Diagnosis  Altered Nutrition related lab values    Related to (etiology):   Altered absorption of nutrients, medication side effects, and nutrition related knowledge deficit    Signs and Symptoms (as evidenced by):   Elevated glucose and elevated A1C    Interventions/Recommendations (treatment strategy):  1) DM 2100 kcal diet restriction added 2) DM diet education given    Nutrition Diagnosis Status:   New

## 2019-03-20 NOTE — PROGRESS NOTES
3 hour dialysis tx today.  Net uf 2.5L and pt fingers began to cramp.  Venous needle site needed 45 min pressure for hemostasis.  Vs remained stable throughout tx

## 2019-03-20 NOTE — CONSULTS
"  [facility]  Adult Nutrition  Consult Note    SUMMARY    Intervention: fat/sodium/potassium/phosphorus/vitamin K restricted diet and nutrition education     Recommendation:  1) change diet to DM 2100 renal, cardiac diet coumadin restriction   2) Weigh pt daily or as needed   3) Nutrition education given    Goals: 1) PO intakes > 50% of meals @ f/u   Nutrition Goal Status: new  Communication of RD Recs: reviewed with RN(POC, sticky note, second sign)    Reason for Assessment    Reason For Assessment: consult  Diagnosis: (severe persistent asthma exacerbation)  Relevant Medical History: ESRD on HD, gout, HTN, HLD    General Information Comments: 79 y/o male admitted with asthma exacerbation and fluid overload, receiving HD and diuresing. Pt on steriods d/t asthma and glucose elevated. PTA pt states he was eating well, now NPO for cardioversion. NFPE done, no wasting seen. Educated pt on low carb/low sugar diet on steriods (elevated A1C). Pt previously educated on renal diet/vitamin K and coumadin interaction 3/13 has no questions.    Nutrition Discharge Planning: To be determined- DM 2100, cardiac, renal diet, coumadin restriction     Nutrition Risk Screen    Nutrition Risk Screen: no indicators present    Nutrition/Diet History    Patient Reported Diet/Restrictions/Preferences: renal  Typical Food/Fluid Intake: 3 meals daily. Pt also stays away from salty and sugary foods. He states that his daughter is a dietitian and has educated him in the past.  Spiritual, Cultural Beliefs, Caodaism Practices, Values that Affect Care: no  Food Allergies: NKFA(or intolerances)  Factors Affecting Nutritional Intake: None identified at this time    Anthropometrics    Temp: 96.6 °F (35.9 °C)  Height Method: Measured  Height: 6' 1"(office 2/28/19)  Height (inches): 73 in  Weight Method: Bed Scale  Weight: 107.9 kg (237 lb 14 oz)  Weight (lb): 237.88 lb  Ideal Body Weight (IBW), Male: 184 lb  % Ideal Body " Weight, Male (lb): 129.28 lb  BMI (Calculated): 31.4  BMI Grade: 30 - 34.9- obesity - grade I  Usual Body Weight (UBW), k kg(11/15/18)  % Usual Body Weight: 110.33  % Weight Change From Usual Weight: 10.1 %       Lab/Procedures/Meds    Pertinent Labs Reviewed: reviewed  BMP  Lab Results   Component Value Date     (L) 2019    K 5.0 2019    CL 98 2019    CO2 20 (L) 2019     (H) 2019    CREATININE 5.4 (H) 2019    CALCIUM 7.7 (L) 2019    ANIONGAP 13 2019    ESTGFRAFRICA 11 (A) 2019    EGFRNONAA 9 (A) 2019     Lab Results   Component Value Date    CALCIUM 7.7 (L) 2019    PHOS 4.7 (H) 2019     Lab Results   Component Value Date    HGBA1C 7.5 (H) 2019     POCT Glucose   Date Value Ref Range Status   2019 245 (H) 70 - 110 mg/dL Final   2019 388 (H) 70 - 110 mg/dL Final   2019 306 (H) 70 - 110 mg/dL Final   2019 375 (H) 70 - 110 mg/dL Final   2019 416 (H) 70 - 110 mg/dL Final   2019 495 (HH) 70 - 110 mg/dL Final   2019 339 (H) 70 - 110 mg/dL Final   2019 221 (H) 70 - 110 mg/dL Final       Pertinent Medications Reviewed: reviewed  Pertinent Medications Comments: statin, insulin, KCl, methyprednisolone, warfarin, torsemide    Estimated/Assessed Needs    Weight Used For Calorie Calculations: 100.9 kg (222 lb 7.1 oz)(KDOQI adjusted weight)  Energy Calorie Requirements (kcal): 20-22 kcal/kg ( obesity vs. HD) = 5042-1531 kcal  Energy Need Method: Kcal/kg  Protein Requirements: 1.0 g protein/kg ( obesity vs. HD) = 100 g protein  Weight Used For Protein Calculations: 100 kg (220 lb 7.4 oz)(KDOQI adjusted weight)  Fluid Requirements (mL):   Estimated Fluid Requirement Method: other (see comments)(20-25 ml/kg)  RDA Method (mL): 20  CHO Requirement: 45-50% on steriods      Nutrition Prescription Ordered    Current Diet Order: Renal, cardiac, coumadin restriction    Evaluation of  Received Nutrient/Fluid Intake    Energy Calories Required: meeting needs  Protein Required: meeting needs  Fluid Required: meeting needs  Tolerance: tolerating  % Intake of Estimated Energy Needs: 80%  % Meal Intake: 75%    Nutrition Risk    Level of Risk/Frequency of Follow-up: low - moderate 2 x weekly until pt with better grasp of nutrition education    Assessment and Plan    Type 2 diabetes mellitus, without long-term current use of insulin    Contributing Nutrition Diagnosis  Altered Nutrition related lab values    Related to (etiology):   Altered absorption of nutrients, medication side effects, and nutrition related knowledge deficit    Signs and Symptoms (as evidenced by):   Elevated glucose and elevated A1C    Interventions/Recommendations (treatment strategy):  1) DM 2100 kcal diet restriction added 2) DM diet education given    Nutrition Diagnosis Status:   New            Monitor and Evaluation    Food and Nutrient Intake: energy intake  Food and Nutrient Adminstration: diet order  Anthropometric Measurements: weight  Biochemical Data, Medical Tests and Procedures: electrolyte and renal panel, glucose/endocrine profile  Nutrition-Focused Physical Findings: overall appearance     Malnutrition Assessment     Skin (Micronutrient): (Patrick = 19)  Teeth (Micronutrient): (WDL)   Micronutrient Evaluation: other (see comments)  Micronutrient Evaluation Comments: Would benefit from Renal MVI with vitamin C longterm   Weight Loss (Malnutrition): (r/t fluid shifts = 4 kg x 2 weeks)           Edema (Fluid Accumulation): 0-->no edema present   Subcutaneous Fat Loss (Final Summary): well nourished  Muscle Loss Evaluation (Final Summary): well nourished         Nutrition Follow-Up    RD Follow-up?: Yes

## 2019-03-20 NOTE — PLAN OF CARE
03/20/19 0002   Patient Assessment/Suction   Level of Consciousness (AVPU) alert   Respiratory Effort Normal;Unlabored   Expansion/Accessory Muscles/Retractions no use of accessory muscles   All Lung Fields Breath Sounds coarse   Rhythm/Pattern, Respiratory depth regular;pattern regular;unlabored   PRE-TX-O2   O2 Device (Oxygen Therapy) nasal cannula   $ Is the patient on Low Flow Oxygen? Yes   Flow (L/min) 2   SpO2 98 %   Pulse Oximetry Type Intermittent   $ Pulse Oximetry - Multiple Charge Pulse Oximetry - Multiple   Pulse 74   Resp 18   Aerosol Therapy   $ Aerosol Therapy Charges Aerosol Treatment   Respiratory Treatment Status (SVN) given   Treatment Route (SVN) mouthpiece;oxygen   Patient Position (SVN) sitting on edge of bed   Post Treatment Assessment (SVN) breath sounds unchanged   Signs of Intolerance (SVN) none   Breath Sounds Post-Respiratory Treatment   Throughout All Fields Post-Treatment All Fields   Throughout All Fields Post-Treatment no change   Post-treatment Heart Rate (beats/min) 77   Post-treatment Resp Rate (breaths/min) 18   Pt tolerates NC and treatments well.

## 2019-03-20 NOTE — UM SECONDARY REVIEW
Physician Advisor External    Level of Care Issue    Approved Inpatient for 3/20/19 per ehr md review.. Spoke with dr dow..

## 2019-03-20 NOTE — PROGRESS NOTES
Pt had 12 beat run of vtac, NP on call notified, pt asymptomatic, spoke to Dr. Hidalgo earlier who put pt NPO for possible cardioversion in am, will continue to monitor

## 2019-03-20 NOTE — PLAN OF CARE
Problem: Adult Inpatient Plan of Care  Goal: Plan of Care Review  Outcome: Ongoing (interventions implemented as appropriate)  Pt remained free from injury/falls this shift. VSS- no signs of any distress. Tele afib. Dialysis today- tolerated well. Pain medication given per order with relief.  POC reviewed with pt- pt awaiting bed opening @ main campus. Pt repositioned independently, skin intact. Bed locked in lowest position, SR upx2, call light within reach. Will continue to monitor.

## 2019-03-20 NOTE — PROGRESS NOTES
Progress Note  Hospital Medicine  Patient Name:Micheal Hunt  MRN:  [id number]  Patient Class: OP- Observation  Admit Date: 3/18/2019  Length of Stay: 0 days  Expected Discharge Date:   Attending Physician: Charissa Olivas MD  Primary Care Provider:  Pk Lakhani MD    SUBJECTIVE:     Principal Problem: Severe persistent asthma with acute exacerbation  Initial history of present illness:  Micheal Hunt is a 78 y.o. male with HTN, HLD, CAD, LVH, ESRD stage V on dialysis (MW&F), mesenteric ischemia, asthma, and anemia who presents to the ED with acute t onset of difficulty breathing for 2 days. Patient presents to the emergency room department with similar complaints on admission dated 03/11/2019.  He reports he was feeling well when he was discharged from the hospital on March 15, 2019.  He went to dialysis straight from the hospital upon discharge and tolerated well.  He has reports increased shortness of breath dizziness and generalized weakness after dialysis.  He presents to the emergency room with complaints of shortness of breath, dizziness and weakness.  He reports he has been taking his nebulizers at home without any improvement of his shortness of breath. The patient called his Pulmonologist's office this morning and was referred to the ER for further evaluation. He was admitted 1 week ago for PNA on 3/11 and discharged 3 days ago on 3/15 to follow-up with his Pulmonologist Dr. Mihir Guillen on 3/22.   Laboratory data and emergency room reviewed.  BNP has improved since previous admission.  He states he is to have dialysis this morning and came to the hospital instead of dialysis due to feeling ill.    PMH/PSH/SH/FH/Meds: reviewed.    Symptoms/Review of Systems:  Patient continues to complain of shortness of breath associated with wheezing.  Going for hemodialysis.  No chest pain or headache, fever or abdominal pain.     Diet:  Adequate intake.    Activity level:  Up with assist  Pain:  Patient reports no  pain.       OBJECTIVE:   Vital Signs (Most Recent):      Temp: 97 °F (36.1 °C) (03/20/19 0748)  Pulse: 90 (03/20/19 0748)  Resp: 18 (03/20/19 0748)  BP: (!) 195/101 (03/20/19 0748)  SpO2: 99 % (03/20/19 0748)       Vital Signs Range (Last 24H):  Temp:  [97 °F (36.1 °C)-97.8 °F (36.6 °C)]   Pulse:  [72-98]   Resp:  [16-18]   BP: (134-195)/()   SpO2:  [93 %-99 %]     Weight: 107.9 kg (237 lb 14 oz)  Body mass index is 31.38 kg/m².    Intake/Output Summary (Last 24 hours) at 3/20/2019 0948  Last data filed at 3/20/2019 0538  Gross per 24 hour   Intake 950 ml   Output 3500 ml   Net -2550 ml     Physical Examination:  General appearance: well developed, appears stated age  Head: normocephalic, atraumatic  Eyes:  conjunctivae/corneas clear. PERRL.  Nose: Nares normal. Septum midline.  Throat: lips, mucosa, and tongue normal; teeth and gums normal, no throat erythema.  Neck: supple, symmetrical, trachea midline, no JVD and thyroid not enlarged, symmetric, no tenderness/mass/nodules  Lungs:  Decreased air movement bilaterally, scattered rhonchi bilaterally.  Chest wall: no tenderness  Heart: regular rate and rhythm, S1, S2 normal, no murmur, click, rub or gallop  Abdomen: soft, non-tender non-distented; bowel sounds normal; no masses,  no organomegaly  Extremities: no cyanosis, clubbing or edema.   Pulses: 2+ and symmetric  Skin: Skin color, texture, turgor normal. No rashes or lesions.  Lymph nodes: Cervical, supraclavicular, and axillary nodes normal.  Neurologic: Normal strength and tone. No focal numbness or weakness. CNII-XII intact.      CBC:  Recent Labs   Lab 03/18/19  1004 03/19/19  0428 03/20/19  0421   WBC 8.80 7.00 8.90   RBC 3.44* 3.44* 3.47*   HGB 10.7* 10.7* 10.8*   HCT 32.6* 32.5* 33.0*   * 109* 100*   MCV 95 95 95   MCH 31.1* 31.1* 31.2*   MCHC 32.8 33.0 32.7   BMP  Recent Labs   Lab 03/14/19  0622  03/15/19  0617  03/18/19  1606 03/19/19  0428 03/19/19  1126 03/20/19  0421   *   < >  271*   < > 208* 286* 495* 469*   *  --  135*   < > 137 136  --  131*   K 4.9  --  4.9   < > 4.7 5.0  --  5.0   CL 98  --  100   < > 99 102  --  98   CO2 25  --  23   < > 28 21*  --  20*   BUN 79*  --  100*   < > 65* 78*  --  106*   CREATININE 4.4*  --  5.1*   < > 4.0* 4.7*  --  5.4*   CALCIUM 7.7*  --  7.5*   < > 8.7 7.9*  --  7.7*   MG 2.4  --  2.4  --   --   --   --   --     < > = values in this interval not displayed.     Lab Results   Component Value Date    INR 4.2 (H) 03/20/2019    INR 3.3 (H) 03/19/2019    INR 2.0 (H) 03/18/2019     Diagnostic Results:  Microbiology Results (last 7 days)     ** No results found for the last 168 hours. **         Chest X-Ray: Mild pulmonary infiltrates favoring pneumonia. Cardiomegaly, atherosclerosis and prior sternotomy.    Assessment/Plan:     * Asthma exacerbation  Community-acquired pneumonia     Supplemental O2 via nasal canula; titrate O2 saturation to >92%.   On IV Solu-Medrol 40 mg daily.  Follow Pulmonary recommendation.  Continue beta 2 agonist bronchodilator treatments.   Continue PO antibiotics - azithromycin.  Check sputum GS and Cx.   Continue routine medications as before.              End-stage renal     Continue Chronic hemodialysis. Monitor daily electrolytes and defer dialysis orders to nephrology.      Anemia of chronic disease due to end-stage renal disease     Chronic problem. Will continue chronic medications and monitor for any changes, adjusting as needed.      Benign prostatic hyperplasia without lower urinary tract symptoms     Chronic problem.  Continue Flomax and Proscar.      Gout, arthritis     Chronic. Continue Allopurinol.      CAD (coronary artery disease), 2V CABG 2007     History of CAD s/p CABG.  No s/s of angina.  Continue ASA, Statin, and BB.  Telemetry monitoring.  Monitor for s/s of ACS.  Follow Cardiology recommendations.       Hyperlipidemia, baseline      Chronic problem. Continue Statin therapy.                Lab  Results   Component Value Date     LDLCALC 46.4 (L) 07/26/2018       Essential hypertension, uncontrolled     Chronic problem. Controlled. Continue home medications and monitor b/p closely, adjusting as necessary.  Increased Coreg 12.5 mg BID.      VTE Risk Mitigation (From admission, onward)        Ordered     heparin (porcine) injection 5,000 Units  Every 12 hours      03/20/19 0959     IP VTE HIGH RISK PATIENT  Once      03/18/19 1330     Reason for no Mechanical VTE Prophylaxis  Once      03/18/19 1330        Charissa Olivas MD  Department of Hospital Medicine   [facility]      Addendum:  Case discussed with Dr. Hidalgo who has discussed case with Interventional Cardiology at [facility] regarding need for left heart catheterization.  Reportedly patient had a recent positive stress cardiac test as outpatient.  Patient underwent coronary artery bypass graft surgery in 2007 and in 2011 had left arm AV fistula placed.  Ever since patient has been complaining of intermittent angina like complain.  There is a concern for left subclavian steal syndrome.  Patient is in need for left heart catheterization.  Discussed the transfer center.  Patient's case was accepted by Hospital Medicine Dr. Aguilar.  Await bed availability.

## 2019-03-20 NOTE — PLAN OF CARE
Problem: Diabetes Comorbidity  Goal: Blood Glucose Level Within Desired Range    Intervention: Maintain Glycemic Control  Intervention: fat/sodium/potassium/phosphorus/vitamin K restricted diet and nutrition education      Recommendation:  1) change diet to DM 2100 renal, cardiac diet coumadin restriction   2) Weigh pt daily or as needed   3) Nutrition education given     Goals: 1) PO intakes > 50% of meals @ f/u   Nutrition Goal Status: new  Communication of RD Recs: reviewed with RN(POC, sticky note, second sign)

## 2019-03-20 NOTE — PROGRESS NOTES
Progress Note  Nephrology    Consult Requested By: Charissa Olivas MD    Reason for Consult: ESRD, dependent on dialysis    SUBJECTIVE:     History of Present Illness:  79 y/o male patient presented to ER today with c/o chest pain, SOB, dizziness.  He has out patient HD in [place] on MWF, and denies missing any treatments.  States he can only tolerate about 2L pulled without cramping.  Renal is consulted for dialysis orders.    3/18  CXR shows pulmonary vascular distention.  Pt was seen in dialysis today, has only had about 300cc UF so far, so still symptomatic.  Has mild pedal edema, .    3.19  Still coughing and dyspnea with exertion.  No chest pain  3/20  Still with some sob.  Pt states [facility].        Asessment/plan:    1.  ESRD--s/p dialysis today   MWF hd with UF.   UF goal 3L.   2.  Fluid volume overload--UF as above  3.  Anemia of chronic dz--Hgb >10, no need for epo at present time  4.  Mild hyponatremia--will correct with removal of fluid  5.  SHPT--no binders on med list, Ca+ low--but no albumin level.  Will add on CMP and phos.    Past Medical History:   Diagnosis Date    NICK (acute kidney injury) 7/29/2016    Allergy     Anemia, mild 12/15/2014    Arthritis     Gout    Benign essential HTN 3/27/2012    BMI 29.0-29.9,adult 5/10/2018    BPH (benign prostatic hyperplasia)     BPH (benign prostatic hyperplasia)     CAD (coronary artery disease) 2006    Chronic kidney disease     due to ibuprofen    Colon polyp     CRF (chronic renal failure), stage 5     Diverticulosis     Gastritis     GERD (gastroesophageal reflux disease)     Gout     History of colon polyps 5/3/2018    HTN (hypertension) 3/27/2012    Hyperlipidemia     Hyperlipidemia     LLL pneumonia 6/14/2018    LVH (left ventricular hypertrophy)     Mesenteric ischemia     Murmur, cardiac 3/27/2012    GRICELDA (obstructive sleep apnea)     DOES NOT USE A MACHINE    Sinus problem     Syncope and collapse       Past Surgical History:   Procedure Laterality Date    APPENDECTOMY      BLOCK-NERVE Left 2016    [facility]    CARDIAC CATHETERIZATION      COLONOSCOPY      COLONOSCOPY N/A 5/3/2018    [facility]    COLONOSCOPY N/A 9/10/2015    [facility] at [facility]    CORONARY ARTERY BYPASS GRAFT  4/2007    x 1    CYSTOSCOPY N/A 2017    [facility] at [facility]    CYSTOSCOPY N/A 11/10/2015    [facility] at [facility]    ESOPHAGOGASTRODUODENOSCOPY (EGD) N/A 2016    [facility] at [facility] ([facility]    ESOPHAGOGASTRODUODENOSCOPY (EGD) N/A 10/15/2014    [facility] at [facility] END[facility]    INJECTION-STEROID-EPIDURAL-TRANSFORAMINAL Left 2016    [facility]    JOINT REPLACEMENT      left knee total replacement  X 3    mid leftt finger      from a cactuss    MOLE REMOVAL      RADIOFREQUENCY THERMOCOAGULATION (RFTC)-NERVE-MEDIAN BRANCH-LUMBAR Left 2016    [facility]    rotative cuff      no rotative cuffs on bilat shoulders has pins     SHOULDER SURGERY      shoulder surgery bilat  RIGHT X 4; LEFT X 3    TRANSRECTAL ULTRASOUND GUIDED PROSTATE BIOPSY Bilateral 11/10/2015    [facility] at [facility] OR    ULTRASOUND-ENDOSCOPIC-UPPER N/A 2017    Performed by Tra [facility] at [facility] ENDO (2ND FLR)    ULTRASOUND-ENDOSCOPIC-UPPER N/A 2016    Performed by Tra [facility] at [facility] ENDO (2ND FLR)    ULTRASOUND-ENDOSCOPIC-UPPER N/A 2015    Performed by Jason Saleem MD at [facility] ENDO (2ND FLR)     Family History   Problem Relation Age of Onset    Heart disease Mother     Sudden death Father     Cancer Father         advanced lung ca- found after 2 story fall    Stroke Sister     Pneumonia Sister          from PNA    Heart disease  Sister     Hypertension Brother     Kidney cancer Neg Hx     Prostate cancer Neg Hx     Urolithiasis Neg Hx     Allergic rhinitis Neg Hx     Allergies Neg Hx     Angioedema Neg Hx     Asthma Neg Hx     Atopy Neg Hx     Eczema Neg Hx     Immunodeficiency Neg Hx     Rhinitis Neg Hx     Urticaria Neg Hx      Social History     Tobacco Use    Smoking status: Never Smoker    Smokeless tobacco: Never Used   Substance Use Topics    Alcohol use: No    Drug use: No       Review of patient's allergies indicates:   Allergen Reactions    Ace inhibitors Other (See Comments)     Cough    Arb-angiotensin receptor antagonist Itching    Eplerenone Other (See Comments)     Marked bradycardia, 40, tiredness and weakness      Sulfa (sulfonamide antibiotics) Itching     Patient says this was 10 years ago and doesn't remember what happened             OBJECTIVE:     Vital Signs Range (Last 24H):  Temp:  [96.6 °F (35.9 °C)-97.8 °F (36.6 °C)]   Pulse:  [63-98]   Resp:  [11-18]   BP: (130-196)/()   SpO2:  [93 %-99 %]     Physical Exam:  General- Patient alert and oriented x3 in NAD  HEENT- WNL  Neck- supple  CV- Regular rate and rhythm,   Resp- faint exp wheezes.   GI- Non tender/non-distended  Extrem- mildly edemetous  Derm- w/d  Neuro-  No flap. No focal deficits noted.     Body mass index is 31.38 kg/m².    Laboratory:  CBC:   Recent Labs   Lab 03/20/19  0421   WBC 8.90   RBC 3.47*   HGB 10.8*   HCT 33.0*   *   MCV 95   MCH 31.2*   MCHC 32.7     CMP:   Recent Labs   Lab 03/18/19  1606  03/20/19  0421   *   < > 469*   CALCIUM 8.7   < > 7.7*   ALBUMIN 3.8  --   --    PROT 6.7  --   --       < > 131*   K 4.7   < > 5.0   CO2 28   < > 20*   CL 99   < > 98   BUN 65*   < > 106*   CREATININE 4.0*   < > 5.4*   ALKPHOS 107  --   --    *  --   --    AST 39  --   --    BILITOT 0.7  --   --     < > = values in this interval not displayed.       Diagnostic Results:  Labs: Reviewed  X-Ray:  Reviewed        ASSESSMENT/PLAN:     Active Hospital Problems    Diagnosis  POA    *Severe persistent asthma with acute exacerbation [J45.51]  Yes    SOB (shortness of breath) [R06.02]  Yes    Acute on chronic diastolic congestive heart failure [I50.33]  Yes    Type 2 diabetes mellitus, without long-term current use of insulin [E11.9]  Yes    Hypertension due to end stage renal disease caused by type 2 diabetes mellitus, on dialysis [E11.22, I12.0, Z99.2, N18.6]  Not Applicable    Pulmonary hypertension [I27.20]  Yes    Persistent atrial fibrillation [I48.1]  Yes    ESRD (end stage renal disease) [N18.6]  Yes    Immune deficiency disorder [D84.9]  Yes    Secondary hyperparathyroidism [N25.81]  Yes    Anemia due to chronic kidney disease, on chronic dialysis [N18.6, D63.1, Z99.2]  Not Applicable    Vertigo [R42]  Yes      Resolved Hospital Problems   No resolved problems to display.         Thank you for allowing us to participate in the care of your patient. We will follow the patient and provide recommendations as needed.      Time spent seeing patient( greater than 1/2 spent in direct contact) :     Frankie Rojo MD  Nephrology  [facility]  [phone]

## 2019-03-20 NOTE — CONSULTS
[facility]  Cardiology  Consult Note    Patient Name: Micheal Hunt  MRN: [id number]  Admission Date: 3/18/2019  Hospital Length of Stay: 0 days  Code Status: Full Code   Attending Provider:   Consulting Provider: Nate Hidalgo MD  Primary Care Physician: Pk Lakhani MD  Principal Problem:Severe persistent asthma with acute exacerbation    Patient information was obtained from patient, past medical records and ER records.     Consults  Subjective:   Atrial fib and SOB   Chief Complaint: pt has noted increased chest burning  Since hes been dialyzed av shunt laft arm ,  Pt has had hospitalizations for copd, chf,and in for same  This time   HPI: cabg in 2007  In [place] ,;Pt has never smoked and started with Sob ~ 2011  And exertional substernal burning  In past year.  2012  Cath: Ef 50%  LVEDP 14  Left main occluded,  Lima to lad open; Cx not evaluated    RCA not well visualized but SVG seen on ao root injection      Nuc EST early 2019 shows  Reversible ischemia     He has been on dialysis several years,  Atrial fib new  And coumadin recently started       Echo  1 week ago shows  Dilated LV  decresed EF, mild to moderate AS, + 3 AI, + 2 MR, PH   Enlarged LA     Past Medical History:   Diagnosis Date    NICK (acute kidney injury) 7/29/2016    Allergy     Anemia, mild 12/15/2014    Arthritis     Gout    Benign essential HTN 3/27/2012    BMI 29.0-29.9,adult 5/10/2018    BPH (benign prostatic hyperplasia)     BPH (benign prostatic hyperplasia)     CAD (coronary artery disease) 2006    Chronic kidney disease     due to ibuprofen    Colon polyp     CRF (chronic renal failure), stage 5     Diverticulosis     Gastritis     GERD (gastroesophageal reflux disease)     Gout     History of colon polyps 5/3/2018    HTN (hypertension) 3/27/2012    Hyperlipidemia     Hyperlipidemia     LLL pneumonia 6/14/2018    LVH (left ventricular hypertrophy)     Mesenteric ischemia      Murmur, cardiac 3/27/2012    GRICELDA (obstructive sleep apnea)     DOES NOT USE A MACHINE    Sinus problem     Syncope and collapse        Past Surgical History:   Procedure Laterality Date    APPENDECTOMY      BLOCK-NERVE Left 5/31/2016    [facility]    CARDIAC CATHETERIZATION      COLONOSCOPY  2011    COLONOSCOPY N/A 5/3/2018    [facility] ENDO    COLONOSCOPY N/A 9/10/2015    [facility]    CORONARY ARTERY BYPASS GRAFT  4/2007    x 1    CYSTOSCOPY N/A 8/30/2017    [facility] at [facility] OR    CYSTOSCOPY N/A 11/10/2015    [facility] at [facility]    ESOPHAGOGASTRODUODENOSCOPY (EGD) N/A 1/4/2016    Performed by Tra [facility] at [facility] (2ND FLR)    ESOPHAGOGASTRODUODENOSCOPY (EGD) N/A 10/15/2014    [facility]formed by [facility] at [facility] ENDO    INJECTION-STEROID-EPIDURAL-TRANSFORAMINAL Left 2/25/2016    [facility] at [facility] OR    JOINT REPLACEMENT      left knee total replacement  X 3    mid leftt finger      from a cactuss    MOLE REMOVAL  2016    RADIOFREQUENCY THERMOCOAGULATION (RFTC)-NERVE-MEDIAN BRANCH-LUMBAR Left 4/11/2016    [facility] at [facility] OR    rotative cuff      no rotative cuffs on bilat shoulders has pins     SHOULDER SURGERY      shoulder surgery bilat  RIGHT X 4; LEFT X 3    TRANSRECTAL ULTRASOUND GUIDED PROSTATE BIOPSY Bilateral 11/10/2015    [facility] at [facility] OR    ULTRASOUND-ENDOSCOPIC-UPPER N/A 5/31/2017    Performed [facility] at [facility] ENDO (2ND FLR)    ULTRASOUND-ENDOSCOPIC-UPPER N/A 1/4/2016    Performed by Tra [facility] at [facility] ENDO (2ND FLR)    ULTRASOUND-ENDOSCOPIC-UPPER N/A 1/16/2015    Performed by Jason Saleem MD at [facility] (2ND FLR)       Review of patient's allergies indicates:   Allergen Reactions    Ace inhibitors Other (See Comments)     Cough     Arb-angiotensin receptor antagonist Itching    Eplerenone Other (See Comments)     Marked bradycardia, 40, tiredness and weakness      Sulfa (sulfonamide antibiotics) Itching     Patient says this was 10 years ago and doesn't remember what happened       No current facility-administered medications on file prior to encounter.      Current Outpatient Medications on File Prior to Encounter   Medication Sig    albuterol (PROVENTIL/VENTOLIN HFA) 90 mcg/actuation inhaler 2 puffs every 4 hours as needed for cough, wheeze, or shortness of breath    albuterol-ipratropium (DUO-NEB) 2.5 mg-0.5 mg/3 mL nebulizer solution Take 3 mLs by nebulization every 6 (six) hours as needed for Wheezing or Shortness of Breath.    allopurinol (ZYLOPRIM) 100 MG tablet TAKE 1 TABLET BY MOUTH EVERY DAY    atorvastatin (LIPITOR) 80 MG tablet TAKE 1 TABLET (80 MG TOTAL) BY MOUTH ONCE DAILY.    azithromycin (Z-JEROD) 250 MG tablet Take 2 tablets (500 mg total) by mouth once daily. for 5 days    budesonide-formoterol 160-4.5 mcg (SYMBICORT) 160-4.5 mcg/actuation HFAA Inhale 2 puffs into the lungs every 12 (twelve) hours. Controller    carvedilol (COREG) 6.25 MG tablet Take 1 tablet (6.25 mg total) by mouth 2 (two) times daily with meals.    celecoxib (CELEBREX) 200 MG capsule Take 1 capsule (200 mg total) by mouth once daily.    finasteride (PROSCAR) 5 mg tablet TAKE 1 TABLET ONCE DAILY.    gabapentin (NEURONTIN) 300 MG capsule Take 1 capsule (300 mg total) by mouth every evening.    isosorbide mononitrate (ISMO,MONOKET) 10 mg tablet TAKE 1 TABLET BY MOUTH EVERY DAY IN THE EVENING    losartan (COZAAR) 100 MG tablet TAKE 1 TABLET BY MOUTH EVERY DAY    meclizine (ANTIVERT) 25 mg tablet Take 1 tablet (25 mg total) by mouth 3 (three) times daily as needed for Dizziness.    predniSONE (DELTASONE) 20 MG tablet 3 pills for 3 days, 2 pills for 3 days, 1 pill for 3 days, repeat if needed.    tamsulosin (FLOMAX) 0.4 mg Cap TAKE 1 CAPSULE  BY MOUTH EVERY DAY    torsemide (DEMADEX) 20 MG Tab Take 1 tablet (20 mg total) by mouth 2 (two) times daily.    warfarin (COUMADIN) 7.5 MG tablet Take 1 tablet (7.5 mg total) by mouth Daily.    aspirin (ECOTRIN) 81 MG EC tablet Take 81 mg by mouth once daily.      fluticasone (FLONASE) 50 mcg/actuation nasal spray 2 sprays (100 mcg total) by Each Nare route once daily.    meclizine (ANTIVERT) 25 mg tablet TAKE 1 TABLET BY MOUTH THREE TIMES A DAY AS NEEDED    mirtazapine (REMERON) 7.5 MG Tab TAKE 1 TABLET BY MOUTH EVERY EVENING.    NITROSTAT 0.4 mg SL tablet PLACE 1 TABLET (0.4 MG TOTAL) UNDER THE TONGUE EVERY 5 (FIVE) MINUTES AS NEEDED FOR CHEST PAIN.    omeprazole (PRILOSEC) 20 MG capsule TAKE 1 CAPSULE BY MOUTH EVERY DAY    sodium chloride (SALINE NASAL MIST) 3 % Mist 1 spray by Nasal route 2 (two) times daily.    tiotropium bromide (SPIRIVA RESPIMAT) 1.25 mcg/actuation Mist Inhale 2.5 mcg into the lungs once daily. Controller    zolpidem (AMBIEN) 5 MG Tab TAKE 1 TABLET BY MOUTH EVERY EVENING AS NEEDED    zolpidem (AMBIEN) 5 MG Tab TAKE 1 TABLET BY MOUTH EVERY EVENING AS NEEDED     Family History     Problem Relation (Age of Onset)    Cancer Father    Heart disease Mother, Sister    Hypertension Brother    Pneumonia Sister    Stroke Sister    Sudden death Father        Tobacco Use    Smoking status: Never Smoker    Smokeless tobacco: Never Used   Substance and Sexual Activity    Alcohol use: No    Drug use: No    Sexual activity: Not on file     Review of Systems   Constitution: Positive for malaise/fatigue.   HENT: Negative.    Eyes: Negative.    Cardiovascular: Positive for chest pain, dyspnea on exertion, irregular heartbeat, orthopnea and palpitations.        Exertional burning in chest  John since shunt put in Left arm    Respiratory: Positive for cough and shortness of breath. Negative for snoring.    Endocrine: Negative.    Skin: Negative.    Musculoskeletal: Positive for arthritis, joint  pain and joint swelling.         Multiple surg on knees and shoulders    Gastrointestinal: Negative.      Objective:     Vital Signs (Most Recent):  Temp: 97 °F (36.1 °C) (03/20/19 0748)  Pulse: 90 (03/20/19 0748)  Resp: 18 (03/20/19 0748)  BP: (!) 195/101 (03/20/19 0748)  SpO2: 99 % (03/20/19 0748) Vital Signs (24h Range):  Temp:  [97 °F (36.1 °C)-97.8 °F (36.6 °C)] 97 °F (36.1 °C)  Pulse:  [72-98] 90  Resp:  [16-18] 18  SpO2:  [93 %-99 %] 99 %  BP: (134-195)/() 195/101     Weight: 107.9 kg (237 lb 14 oz)  Body mass index is 31.38 kg/m².    SpO2: 99 %  O2 Device (Oxygen Therapy): room air      Intake/Output Summary (Last 24 hours) at 3/20/2019 0917  Last data filed at 3/20/2019 0538  Gross per 24 hour   Intake 950 ml   Output 3500 ml   Net -2550 ml       Lines/Drains/Airways     Drain                 Hemodialysis AV Fistula Left upper arm -- days          Peripheral Intravenous Line                 Peripheral IV - Single Lumen 03/18/19 1207 Right Wrist 1 day                Physical Exam    bp 150/80 irreg R arm   HEENT   No carotid bruits but  Continuous M l supraclavicle area  + l arm   Lungs few crackles   Cor: irreg  1/6 DEANA Ao area  , 2/6 sys M at apex  ? morrell M LSB   abd ok   Legs no edema     doppler PT pressures 160 bilat   Significant Labs:   CMP   Recent Labs   Lab 03/18/19  1606 03/19/19  0428 03/19/19  1126 03/20/19  0421    136  --  131*   K 4.7 5.0  --  5.0   CL 99 102  --  98   CO2 28 21*  --  20*   * 286* 495* 469*   BUN 65* 78*  --  106*   CREATININE 4.0* 4.7*  --  5.4*   CALCIUM 8.7 7.9*  --  7.7*   PROT 6.7  --   --   --    ALBUMIN 3.8  --   --   --    BILITOT 0.7  --   --   --    ALKPHOS 107  --   --   --    AST 39  --   --   --    *  --   --   --    ANIONGAP 10 13  --  13   ESTGFRAFRICA 15* 13*  --  11*   EGFRNONAA 13* 11*  --  9*    and CBC   Recent Labs   Lab 03/18/19  1004 03/19/19  0428 03/20/19  0421   WBC 8.80 7.00 8.90   HGB 10.7* 10.7* 10.8*   HCT 32.6* 32.5*  33.0*   * 109* 100*       Significant Imaging:   Assessment and Plan:   1 significant CAD with internal masmmary steal 2nd to av shunt l arm  + ? Status of RCA + cx   2) possible hemodynamically significant  AI + MR  And mild AS + decreased EF  Exacerbated by  A Fib   3) CKD + anemia     REc  Transfer uptown  [facility] for full right and left heart cath and if DOMINIQUE to lad ok ,  Change dialysis shunt to R arm    I personally reviewed chart and imaging studies ,examined patient, and discussed with the patient her illness and treatment including diet and follow-up care. This consult was prolonged and required approximately 50% time for review and 50% time examining patient and writing notes and orders     Active Diagnoses:    Diagnosis Date Noted POA    PRINCIPAL PROBLEM:  Severe persistent asthma with acute exacerbation [J45.51] 02/21/2018 Yes    Acute on chronic diastolic congestive heart failure [I50.33] 03/18/2019 Yes    Type 2 diabetes mellitus, without long-term current use of insulin [E11.9] 03/18/2019 Yes    Hypertension due to end stage renal disease caused by type 2 diabetes mellitus, on dialysis [E11.22, I12.0, Z99.2, N18.6] 03/18/2019 Not Applicable    Pulmonary hypertension [I27.20] 03/18/2019 Yes    Persistent atrial fibrillation [I48.1] 03/12/2019 Yes    ESRD (end stage renal disease) [N18.6] 03/12/2019 Yes    Immune deficiency disorder [D84.9] 03/11/2019 Yes    Secondary hyperparathyroidism [N25.81] 08/29/2018 Yes    Anemia due to chronic kidney disease, on chronic dialysis [N18.6, D63.1, Z99.2] 02/25/2018 Not Applicable    Vertigo [R42] 03/27/2012 Yes      Problems Resolved During this Admission:       VTE Risk Mitigation (From admission, onward)        Ordered     warfarin tablet 7.5 mg  Daily      03/19/19 1414     IP VTE HIGH RISK PATIENT  Once      03/18/19 1330     Reason for no Mechanical VTE Prophylaxis  Once      03/18/19 1330          Thank you for your consult.     Nate Hidalgo,  MD  Cardiology   [facility]

## 2019-03-21 ENCOUNTER — TELEPHONE (OUTPATIENT)
Dept: FAMILY MEDICINE | Facility: CLINIC | Age: 79
End: 2019-03-21

## 2019-03-21 LAB
ANION GAP SERPL CALC-SCNC: 13 MMOL/L
BASOPHILS # BLD AUTO: 0 K/UL
BASOPHILS NFR BLD: 0.2 %
BUN SERPL-MCNC: 92 MG/DL
CALCIUM SERPL-MCNC: 7.6 MG/DL
CHLORIDE SERPL-SCNC: 100 MMOL/L
CK MB SERPL-MCNC: 4.9 NG/ML
CK MB SERPL-RTO: 3.1 %
CK SERPL-CCNC: 157 U/L
CO2 SERPL-SCNC: 22 MMOL/L
CREAT SERPL-MCNC: 4.8 MG/DL
DIFFERENTIAL METHOD: ABNORMAL
EOSINOPHIL # BLD AUTO: 0 K/UL
EOSINOPHIL NFR BLD: 0 %
ERYTHROCYTE [DISTWIDTH] IN BLOOD BY AUTOMATED COUNT: 18.1 %
EST. GFR  (AFRICAN AMERICAN): 12 ML/MIN/1.73 M^2
EST. GFR  (NON AFRICAN AMERICAN): 11 ML/MIN/1.73 M^2
ESTIMATED AVG GLUCOSE: 192 MG/DL
GLUCOSE SERPL-MCNC: 258 MG/DL
HBA1C MFR BLD HPLC: 8.3 %
HCT VFR BLD AUTO: 32.3 %
HGB BLD-MCNC: 10.7 G/DL
INR PPP: 2.7
LYMPHOCYTES # BLD AUTO: 0.5 K/UL
LYMPHOCYTES NFR BLD: 6.1 %
MCH RBC QN AUTO: 31.3 PG
MCHC RBC AUTO-ENTMCNC: 33 G/DL
MCV RBC AUTO: 95 FL
MONOCYTES # BLD AUTO: 0.5 K/UL
MONOCYTES NFR BLD: 6 %
NEUTROPHILS # BLD AUTO: 7.9 K/UL
NEUTROPHILS NFR BLD: 87.7 %
PLATELET # BLD AUTO: 96 K/UL
PMV BLD AUTO: 8.6 FL
POCT GLUCOSE: 274 MG/DL (ref 70–110)
POCT GLUCOSE: 276 MG/DL (ref 70–110)
POCT GLUCOSE: 440 MG/DL (ref 70–110)
POCT GLUCOSE: 474 MG/DL (ref 70–110)
POCT GLUCOSE: 491 MG/DL (ref 70–110)
POTASSIUM SERPL-SCNC: 4.7 MMOL/L
PROTHROMBIN TIME: 27.5 SEC
RBC # BLD AUTO: 3.41 M/UL
SODIUM SERPL-SCNC: 135 MMOL/L
TROPONIN I SERPL DL<=0.01 NG/ML-MCNC: 0.09 NG/ML
TROPONIN I SERPL DL<=0.01 NG/ML-MCNC: 0.1 NG/ML
WBC # BLD AUTO: 9 K/UL

## 2019-03-21 PROCEDURE — 94761 N-INVAS EAR/PLS OXIMETRY MLT: CPT

## 2019-03-21 PROCEDURE — 85610 PROTHROMBIN TIME: CPT

## 2019-03-21 PROCEDURE — 85025 COMPLETE CBC W/AUTO DIFF WBC: CPT

## 2019-03-21 PROCEDURE — 80048 BASIC METABOLIC PNL TOTAL CA: CPT

## 2019-03-21 PROCEDURE — 12000002 HC ACUTE/MED SURGE SEMI-PRIVATE ROOM

## 2019-03-21 PROCEDURE — 25000003 PHARM REV CODE 250: Performed by: NURSE PRACTITIONER

## 2019-03-21 PROCEDURE — 99232 PR SUBSEQUENT HOSPITAL CARE,LEVL II: ICD-10-PCS | Mod: ,,, | Performed by: INTERNAL MEDICINE

## 2019-03-21 PROCEDURE — 36415 COLL VENOUS BLD VENIPUNCTURE: CPT

## 2019-03-21 PROCEDURE — 25000003 PHARM REV CODE 250: Performed by: INTERNAL MEDICINE

## 2019-03-21 PROCEDURE — 25000242 PHARM REV CODE 250 ALT 637 W/ HCPCS: Performed by: NURSE PRACTITIONER

## 2019-03-21 PROCEDURE — 63600175 PHARM REV CODE 636 W HCPCS: Performed by: INTERNAL MEDICINE

## 2019-03-21 PROCEDURE — 84484 ASSAY OF TROPONIN QUANT: CPT

## 2019-03-21 PROCEDURE — 94640 AIRWAY INHALATION TREATMENT: CPT

## 2019-03-21 PROCEDURE — 83036 HEMOGLOBIN GLYCOSYLATED A1C: CPT

## 2019-03-21 PROCEDURE — 25000003 PHARM REV CODE 250: Performed by: HOSPITALIST

## 2019-03-21 PROCEDURE — 99232 SBSQ HOSP IP/OBS MODERATE 35: CPT | Mod: ,,, | Performed by: INTERNAL MEDICINE

## 2019-03-21 PROCEDURE — 94644 CONT INHLJ TX 1ST HOUR: CPT

## 2019-03-21 PROCEDURE — 27000221 HC OXYGEN, UP TO 24 HOURS

## 2019-03-21 PROCEDURE — S5571 INSULIN DISPOS PEN 3 ML: HCPCS | Performed by: INTERNAL MEDICINE

## 2019-03-21 RX ORDER — CARVEDILOL 6.25 MG/1
25 TABLET ORAL 2 TIMES DAILY WITH MEALS
Status: DISCONTINUED | OUTPATIENT
Start: 2019-03-22 | End: 2019-03-22 | Stop reason: HOSPADM

## 2019-03-21 RX ORDER — INSULIN ASPART 100 [IU]/ML
16 INJECTION, SOLUTION INTRAVENOUS; SUBCUTANEOUS ONCE
Status: COMPLETED | OUTPATIENT
Start: 2019-03-21 | End: 2019-03-21

## 2019-03-21 RX ADMIN — IPRATROPIUM BROMIDE AND ALBUTEROL SULFATE 3 ML: .5; 3 SOLUTION RESPIRATORY (INHALATION) at 07:03

## 2019-03-21 RX ADMIN — INSULIN ASPART 3 UNITS: 100 INJECTION, SOLUTION INTRAVENOUS; SUBCUTANEOUS at 10:03

## 2019-03-21 RX ADMIN — INSULIN ASPART 10 UNITS: 100 INJECTION, SOLUTION INTRAVENOUS; SUBCUTANEOUS at 05:03

## 2019-03-21 RX ADMIN — GABAPENTIN 300 MG: 300 CAPSULE ORAL at 08:03

## 2019-03-21 RX ADMIN — TORSEMIDE 20 MG: 20 TABLET ORAL at 08:03

## 2019-03-21 RX ADMIN — METHYLPREDNISOLONE SODIUM SUCCINATE 40 MG: 40 INJECTION, POWDER, FOR SOLUTION INTRAMUSCULAR; INTRAVENOUS at 09:03

## 2019-03-21 RX ADMIN — RAMELTEON 8 MG: 8 TABLET, FILM COATED ORAL at 12:03

## 2019-03-21 RX ADMIN — ALLOPURINOL 100 MG: 100 TABLET ORAL at 09:03

## 2019-03-21 RX ADMIN — ASPIRIN 81 MG: 81 TABLET, COATED ORAL at 09:03

## 2019-03-21 RX ADMIN — TRAMADOL HYDROCHLORIDE 50 MG: 50 TABLET, FILM COATED ORAL at 02:03

## 2019-03-21 RX ADMIN — INSULIN ASPART 6 UNITS: 100 INJECTION, SOLUTION INTRAVENOUS; SUBCUTANEOUS at 06:03

## 2019-03-21 RX ADMIN — TAMSULOSIN HYDROCHLORIDE 0.4 MG: 0.4 CAPSULE ORAL at 09:03

## 2019-03-21 RX ADMIN — RAMELTEON 8 MG: 8 TABLET, FILM COATED ORAL at 08:03

## 2019-03-21 RX ADMIN — INSULIN DETEMIR 8 UNITS: 100 INJECTION, SOLUTION SUBCUTANEOUS at 09:03

## 2019-03-21 RX ADMIN — INSULIN ASPART 16 UNITS: 100 INJECTION, SOLUTION INTRAVENOUS; SUBCUTANEOUS at 07:03

## 2019-03-21 RX ADMIN — CARVEDILOL 12.5 MG: 6.25 TABLET, FILM COATED ORAL at 05:03

## 2019-03-21 RX ADMIN — ISOSORBIDE MONONITRATE 10 MG: 10 TABLET ORAL at 08:03

## 2019-03-21 RX ADMIN — TORSEMIDE 20 MG: 20 TABLET ORAL at 09:03

## 2019-03-21 RX ADMIN — MIRTAZAPINE 7.5 MG: 7.5 TABLET ORAL at 08:03

## 2019-03-21 RX ADMIN — CARVEDILOL 12.5 MG: 6.25 TABLET, FILM COATED ORAL at 09:03

## 2019-03-21 RX ADMIN — INSULIN ASPART 6 UNITS: 100 INJECTION, SOLUTION INTRAVENOUS; SUBCUTANEOUS at 12:03

## 2019-03-21 RX ADMIN — IPRATROPIUM BROMIDE AND ALBUTEROL SULFATE 3 ML: .5; 3 SOLUTION RESPIRATORY (INHALATION) at 03:03

## 2019-03-21 RX ADMIN — AZITHROMYCIN 500 MG: 250 TABLET, FILM COATED ORAL at 09:03

## 2019-03-21 RX ADMIN — IPRATROPIUM BROMIDE AND ALBUTEROL SULFATE 3 ML: .5; 3 SOLUTION RESPIRATORY (INHALATION) at 11:03

## 2019-03-21 RX ADMIN — FLUTICASONE PROPIONATE 100 MCG: 50 SPRAY, METERED NASAL at 09:03

## 2019-03-21 RX ADMIN — FINASTERIDE 5 MG: 5 TABLET, FILM COATED ORAL at 09:03

## 2019-03-21 RX ADMIN — LOSARTAN POTASSIUM 100 MG: 25 TABLET ORAL at 08:03

## 2019-03-21 RX ADMIN — ATORVASTATIN CALCIUM 80 MG: 40 TABLET, FILM COATED ORAL at 08:03

## 2019-03-21 NOTE — PLAN OF CARE
Problem: Adult Inpatient Plan of Care  Goal: Plan of Care Review  Outcome: Ongoing (interventions implemented as appropriate)  Pt on room air with 98% sats and Q4 duoneb treatments.

## 2019-03-21 NOTE — PROGRESS NOTES
"[facility]  Cardiology  Progress Note    Patient Name: Micheal Hunt  MRN: [id number]  Admission Date: 3/18/2019  Hospital Length of Stay: 1 days  Code Status: Full Code   Attending Provider:   Consulting Provider: ROSALBA Keller  Primary Care Physician: Pk Lakhani MD  Principal Problem:Severe persistent asthma with acute exacerbation    Subjective:   Atrial fib and SOB   Chief Complaint: pt has noted increased chest burning  Since hes been dialyzed av shunt laft arm ,  Pt has had hospitalizations for copd, chf,and in for same  This time   HPI: cabg in 2007  In [place] ,;Pt has never smoked and started with Sob ~ 2011  And exertional substernal burning  In past year.  2012  Cath: Ef 50%  LVEDP 14  Left main occluded,  Lima to lad open; Cx not evaluated    RCA not well visualized but SVG seen on ao root injection      Nuc EST early 2019 shows  Reversible ische    He has been on dialysis several years,  Atrial fib new  And coumadin recently started       Echo  1 week ago shows  Dilated LV  decresed EF, mild to moderate AS, + 3 AI, + 2 MR, PH   Enlarged LA     Interval Hx:  Pt a bit depressed today, states he is "tired of being sick and also does not want to take coumadin". Did have a nose bleed last night and humidity was added to his oxygen.   Encouraged that main campus for higher level of medication mngt is in his best interest.  Will need to start Lovenox while he waits for a bed and transfer.  No other concerns voiced.    ROS:  As per above and previous ROS, otherwise negative.    Past Medical History:   Diagnosis Date    NICK (acute kidney injury) 7/29/2016    Allergy     Anemia, mild 12/15/2014    Arthritis     Gout    Benign essential HTN 3/27/2012    BMI 29.0-29.9,adult 5/10/2018    BPH (benign prostatic hyperplasia)     BPH (benign prostatic hyperplasia)     CAD (coronary artery disease) 2006    Chronic kidney disease     due to ibuprofen    Colon polyp     " CRF (chronic renal failure), stage 5     Diverticulosis     Gastritis     GERD (gastroesophageal reflux disease)     Gout     History of colon polyps 5/3/2018    HTN (hypertension) 3/27/2012    Hyperlipidemia     Hyperlipidemia     LLL pneumonia 6/14/2018    LVH (left ventricular hypertrophy)     Mesenteric ischemia     Murmur, cardiac 3/27/2012    GRICELDA (obstructive sleep apnea)     DOES NOT USE A MACHINE    Sinus problem     Syncope and collapse        Past Surgical History:   Procedure Laterality Date    APPENDECTOMY      BLOCK-NERVE Left 5/31/2016    [facility]    CARDIAC CATHETERIZATION      COLONOSCOPY  2011    COLONOSCOPY N/A 5/3/2018    [facility] at [facility] ENDO    COLONOSCOPY N/A 9/10/2015    Performed by Mesis Harris MD at [facility] ENDO    CORONARY ARTERY BYPASS GRAFT  4/2007    x 1    CYSTOSCOPY N/A 8/30/2017    [facility]    CYSTOSCOPY N/A 11/10/2015    [facility] at [facility] OR    ESOPHAGOGASTRODUODENOSCOPY (EGD) N/A 1/4/2016    Performed by Tra [facility] at [facility] ENDO (2ND FLR)    ESOPHAGOGASTRODUODENOSCOPY (EGD) N/A 10/15/2014    Performed by Messi Harris MD at [facility] ENDO    INJECTION-STEROID-EPIDURAL-TRANSFORAMINAL Left 2/25/2016    [facility] OR    JOINT REPLACEMENT      left knee total replacement  X 3    mid leftt finger      from a cactuss    MOLE REMOVAL  2016    RADIOFREQUENCY THERMOCOAGULATION (RFTC)-NERVE-MEDIAN BRANCH-LUMBAR Left 4/11/2016    [facility] at [facility] OR    rotative cuff      no rotative cuffs on bilat shoulders has pins     SHOULDER SURGERY      shoulder surgery bilat  RIGHT X 4; LEFT X 3    TRANSRECTAL ULTRASOUND GUIDED PROSTATE BIOPSY Bilateral 11/10/2015    Performed by [facility] at [facility] OR    ULTRASOUND-ENDOSCOPIC-UPPER N/A 5/31/2017    Performed by Tra [facility] at [facility] ENDO  (2ND FLR)    ULTRASOUND-ENDOSCOPIC-UPPER N/A 2016    [facility]formed [facility] ENDO (2ND FLR)    ULTRASOUND-ENDOSCOPIC-UPPER N/A 2015    Performed by Jason [facility]em MD at [facility] ENDO (2ND FLR)       Review of patient's allergies indicates:   Allergen Reactions    Ace inhibitors Other (See Comments)     Cough    Arb-angiotensin receptor antagonist Itching    Eplerenone Other (See Comments)     Marked bradycardia, 40, tiredness and weakness      Sulfa (sulfonamide antibiotics) Itching     Patient says this was 10 years ago and doesn't remember what happened       No current facility-administered medications on file prior to encounter.      Current Outpatient Medications on File Prior to Encounter   Medication Sig    albuterol (PROVENTIL/VENTOLIN HFA) 90 mcg/actuation inhaler 2 puffs every 4 hours as needed for cough, wheeze, or shortness of breath    albuterol-ipratropium (DUO-NEB) 2.5 mg-0.5 mg/3 mL nebulizer solution Take 3 mLs by nebulization every 6 (six) hours as needed for Wheezing or Shortness of Breath.    allopurinol (ZYLOPRIM) 100 MG tablet TAKE 1 TABLET BY MOUTH EVERY DAY    atorvastatin (LIPITOR) 80 MG tablet TAKE 1 TABLET (80 MG TOTAL) BY MOUTH ONCE DAILY.    [] azithromycin (Z-JEROD) 250 MG tablet Take 2 tablets (500 mg total) by mouth once daily. for 5 days    budesonide-formoterol 160-4.5 mcg (SYMBICORT) 160-4.5 mcg/actuation HFAA Inhale 2 puffs into the lungs every 12 (twelve) hours. Controller    carvedilol (COREG) 6.25 MG tablet Take 1 tablet (6.25 mg total) by mouth 2 (two) times daily with meals.    celecoxib (CELEBREX) 200 MG capsule Take 1 capsule (200 mg total) by mouth once daily.    finasteride (PROSCAR) 5 mg tablet TAKE 1 TABLET ONCE DAILY.    gabapentin (NEURONTIN) 300 MG capsule Take 1 capsule (300 mg total) by mouth every evening.    isosorbide mononitrate (ISMO,MONOKET) 10 mg tablet TAKE 1 TABLET BY MOUTH EVERY DAY IN THE EVENING    losartan  (COZAAR) 100 MG tablet TAKE 1 TABLET BY MOUTH EVERY DAY    meclizine (ANTIVERT) 25 mg tablet Take 1 tablet (25 mg total) by mouth 3 (three) times daily as needed for Dizziness.    predniSONE (DELTASONE) 20 MG tablet 3 pills for 3 days, 2 pills for 3 days, 1 pill for 3 days, repeat if needed.    tamsulosin (FLOMAX) 0.4 mg Cap TAKE 1 CAPSULE BY MOUTH EVERY DAY    torsemide (DEMADEX) 20 MG Tab Take 1 tablet (20 mg total) by mouth 2 (two) times daily.    warfarin (COUMADIN) 7.5 MG tablet Take 1 tablet (7.5 mg total) by mouth Daily.    aspirin (ECOTRIN) 81 MG EC tablet Take 81 mg by mouth once daily.      fluticasone (FLONASE) 50 mcg/actuation nasal spray 2 sprays (100 mcg total) by Each Nare route once daily.    meclizine (ANTIVERT) 25 mg tablet TAKE 1 TABLET BY MOUTH THREE TIMES A DAY AS NEEDED    mirtazapine (REMERON) 7.5 MG Tab TAKE 1 TABLET BY MOUTH EVERY EVENING.    NITROSTAT 0.4 mg SL tablet PLACE 1 TABLET (0.4 MG TOTAL) UNDER THE TONGUE EVERY 5 (FIVE) MINUTES AS NEEDED FOR CHEST PAIN.    omeprazole (PRILOSEC) 20 MG capsule TAKE 1 CAPSULE BY MOUTH EVERY DAY    sodium chloride (SALINE NASAL MIST) 3 % Mist 1 spray by Nasal route 2 (two) times daily.    tiotropium bromide (SPIRIVA RESPIMAT) 1.25 mcg/actuation Mist Inhale 2.5 mcg into the lungs once daily. Controller    zolpidem (AMBIEN) 5 MG Tab TAKE 1 TABLET BY MOUTH EVERY EVENING AS NEEDED    zolpidem (AMBIEN) 5 MG Tab TAKE 1 TABLET BY MOUTH EVERY EVENING AS NEEDED     Family History     Problem Relation (Age of Onset)    Cancer Father    Heart disease Mother, Sister    Hypertension Brother    Pneumonia Sister    Stroke Sister    Sudden death Father        Tobacco Use    Smoking status: Never Smoker    Smokeless tobacco: Never Used   Substance and Sexual Activity    Alcohol use: No    Drug use: No    Sexual activity: Not on file       Objective:     Vital Signs (Most Recent):  Temp: 96 °F (35.6 °C) (03/21/19 0756)  Pulse: 89 (03/21/19  0756)  Resp: 16 (03/21/19 0756)  BP: (!) 195/90 (03/21/19 0756)  SpO2: 97 % (03/21/19 0756) Vital Signs (24h Range):  Temp:  [96 °F (35.6 °C)-97.2 °F (36.2 °C)] 96 °F (35.6 °C)  Pulse:  [63-93] 89  Resp:  [11-22] 16  SpO2:  [96 %-99 %] 97 %  BP: (151-196)/() 195/90     Weight: 107.9 kg (237 lb 14 oz)  Body mass index is 31.38 kg/m².    SpO2: 97 %  O2 Device (Oxygen Therapy): nasal cannula      Intake/Output Summary (Last 24 hours) at 3/21/2019 1047  Last data filed at 3/21/2019 0900  Gross per 24 hour   Intake 900 ml   Output 3325 ml   Net -2425 ml       Lines/Drains/Airways     Drain                 Hemodialysis AV Fistula Left upper arm -- days          Peripheral Intravenous Line                 Peripheral IV - Single Lumen 03/18/19 1207 Right Wrist 2 days                Physical Exam    bp 150/80 irreg R arm   HEENT   No carotid bruits but  Continuous M l supraclavicle area  + l arm   Lungs few crackles to bases  Cor: irreg  1/6 DEANA Ao area  , 2/6 sys M at apex  ? morrell M LSB   abd ok   Legs no edema     doppler PT pressures 160 bilat     Significant Labs:   CMP   Recent Labs   Lab 03/19/19  1126 03/20/19  0421 03/21/19  0456   NA  --  131* 135*   K  --  5.0 4.7   CL  --  98 100   CO2  --  20* 22*   * 469* 258*   BUN  --  106* 92*   CREATININE  --  5.4* 4.8*   CALCIUM  --  7.7* 7.6*   ANIONGAP  --  13 13   ESTGFRAFRICA  --  11* 12*   EGFRNONAA  --  9* 11*    and CBC   Recent Labs   Lab 03/20/19  0421 03/21/19  0456   WBC 8.90 9.00   HGB 10.8* 10.7*   HCT 33.0* 32.3*   * 96*       Significant Imaging:   Assessment and Plan:   1 significant CAD with internal mammary steal 2nd to av shunt l arm  + ? Status of RCA + cx   2) possible hemodynamically significant  AI + MR  And mild AS + decreased EF  Exacerbated by  A Fib   3) CKD + anemia   4 heparin therapeutic but he will need heparin soon   Reccomend  transfer uptown  [facility] for full right and left heart cath and if DOMINIQUE to lad ok ,  Change dialysis  shunt to R arm. Still awaiting trfr.  Yesterday stopped coumadin for pending angiogram, will need to trfr to Lovenox.    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, on dialysis [E11.22, I12.0, Z99.2, N18.6] 03/18/2019 Not Applicable    Pulmonary hypertension [I27.20] 03/18/2019 Yes    Persistent atrial fibrillation [I48.1] 03/12/2019 Yes    ESRD (end stage renal disease) [N18.6] 03/12/2019 Yes    Immune deficiency disorder [D84.9] 03/11/2019 Yes    Secondary hyperparathyroidism [N25.81] 08/29/2018 Yes    Anemia due to chronic kidney disease, on chronic dialysis [N18.6, D63.1, Z99.2] 02/25/2018 Not Applicable    Vertigo [R42] 03/27/2012 Yes      Problems Resolved During this Admission:       VTE Risk Mitigation (From admission, onward)        Ordered     IP VTE HIGH RISK PATIENT  Once      03/18/19 1330     Reason for no Mechanical VTE Prophylaxis  Once      03/18/19 1330          Janine Umanzor, GAVIOTAP  Cardiology   [facility]

## 2019-03-21 NOTE — PROGRESS NOTES
Progress Note  Nephrology    Consult Requested By: Charissa Olivas MD    Reason for Consult: ESRD, dependent on dialysis    SUBJECTIVE:     History of Present Illness:  77 y/o male patient presented to ER today with c/o chest pain, SOB, dizziness.  He has out patient HD in [place] on MWF, and denies missing any treatments.  States he can only tolerate about 2L pulled without cramping.  Renal is consulted for dialysis orders.    3/18  CXR shows pulmonary vascular distention.  Pt was seen in dialysis today, has only had about 300cc UF so far, so still symptomatic.  Has mild pedal edema, .    3.19  Still coughing and dyspnea with exertion.  No chest pain  3/20  Still with some sob.  Pt states he's going to main campus.   3/21  No chest pain or nausea.  Feels alright this am      Asessment/plan:    1.  ESRD--   MWF hd with UF.   UF goal 3L.   2.  Fluid volume overload--UF as above  3.  Anemia of chronic dz--Hgb >10, no need for epo at present time  4.  Mild hyponatremia--will correct with removal of fluid  5.  SHPT--no binders on med list, Ca+ low--but no albumin level.  Will add on CMP and phos.    Past Medical History:   Diagnosis Date    NICK (acute kidney injury) 7/29/2016    Allergy     Anemia, mild 12/15/2014    Arthritis     Gout    Benign essential HTN 3/27/2012    BMI 29.0-29.9,adult 5/10/2018    BPH (benign prostatic hyperplasia)     BPH (benign prostatic hyperplasia)     CAD (coronary artery disease) 2006    Chronic kidney disease     due to ibuprofen    Colon polyp     CRF (chronic renal failure), stage 5     Diverticulosis     Gastritis     GERD (gastroesophageal reflux disease)     Gout     History of colon polyps 5/3/2018    HTN (hypertension) 3/27/2012    Hyperlipidemia     Hyperlipidemia     LLL pneumonia 6/14/2018    LVH (left ventricular hypertrophy)     Mesenteric ischemia     Murmur, cardiac 3/27/2012    GRICELDA (obstructive sleep apnea)     DOES NOT USE A MACHINE    Sinus  problem     Syncope and collapse      Past Surgical History:   Procedure Laterality Date    APPENDECTOMY      BLOCK-NERVE Left 5/31/2016    [facility]    CARDIAC CATHETERIZATION      COLONOSCOPY  2011    COLONOSCOPY N/A 5/3/2018    Performed by [facility] Harris [facility] at [facility]    COLONOSCOPY N/A 9/10/2015    Per[facility] by [facility] at [facility]    CORONARY ARTERY BYPASS GRAFT  4/2007    x 1    CYSTOSCOPY N/A 8/30/2017    [facility]ring [facility] at [facility]    CYSTOSCOPY N/A 11/10/2015    [facility] at [facility]    ESOPHAGOGASTRODUODENOSCOPY (EGD) N/A 1/4/2016    Performed by Tra [facility] at [facility] (2ND F[facility])    ESOPHAGOGASTRODUODENOSCOPY (EGD) N/A 10/15/2014    Performed by Messi Harris MD at [facility]    INJECTION-STEROID-EPIDURAL-TRANSFORAMINAL Left 2/25/2016    [facility] at [facility]    JOINT REPLACEMENT      left knee total replacement  X 3    mid leftt finger      from a cactuss    MOLE REMOVAL  2016    RADIOFREQUENCY THERMOCOAGULATION (RFTC)-NERVE-MEDIAN BRANCH-LUMBAR Left 4/11/2016    Per[facility] by [facility] at [facility]    rotative cuff      no rotative cuffs on bilat shoulders has pins     SHOULDER SURGERY      shoulder surgery bilat  RIGHT X 4; LEFT X 3    TRANSRECTAL ULTRASOUND GUIDED PROSTATE BIOPSY Bilateral 11/10/2015    Per[facility] by [facility] at [facility] OR    ULTRASOUND-ENDOSCOPIC-UPPER N/A 5/31/2017    [facility] (2ND FLR)    ULTRASOUND-ENDOSCOPIC-UPPER N/A 1/4/2016    [facility] at [facility] (2ND FLR)    ULTRASOUND-ENDOSCOPIC-UPPER N/A 1/16/2015    Performed by Jason Saleem [facility] at [facility] (2ND FLR)     Family History   Problem Relation Age of Onset    Heart disease Mother     Sudden death Father     Cancer Father         advanced lung ca- found after 2 story fall    Stroke Sister     Pneumonia Sister           from PNA    Heart disease Sister     Hypertension Brother     Kidney cancer Neg Hx     Prostate cancer Neg Hx     Urolithiasis Neg Hx     Allergic rhinitis Neg Hx     Allergies Neg Hx     Angioedema Neg Hx     Asthma Neg Hx     Atopy Neg Hx     Eczema Neg Hx     Immunodeficiency Neg Hx     Rhinitis Neg Hx     Urticaria Neg Hx      Social History     Tobacco Use    Smoking status: Never Smoker    Smokeless tobacco: Never Used   Substance Use Topics    Alcohol use: No    Drug use: No       Review of patient's allergies indicates:   Allergen Reactions    Ace inhibitors Other (See Comments)     Cough    Arb-angiotensin receptor antagonist Itching    Eplerenone Other (See Comments)     Marked bradycardia, 40, tiredness and weakness      Sulfa (sulfonamide antibiotics) Itching     Patient says this was 10 years ago and doesn't remember what happened             OBJECTIVE:     Vital Signs Range (Last 24H):  Temp:  [96 °F (35.6 °C)-97.2 °F (36.2 °C)]   Pulse:  [63-93]   Resp:  [11-22]   BP: (151-196)/()   SpO2:  [96 %-99 %]     Physical Exam:  General- Patient alert and oriented x3 in NAD  HEENT- WNL  Neck- supple  CV- Regular rate and rhythm,   Resp- faint exp wheezes.   GI- Non tender/non-distended  Extrem- mildly edemetous  Derm- w/d  Neuro-  No flap. No focal deficits noted.     Body mass index is 31.38 kg/m².    Laboratory:  CBC:   Recent Labs   Lab 19  0456   WBC 9.00   RBC 3.41*   HGB 10.7*   HCT 32.3*   PLT 96*   MCV 95   MCH 31.3*   MCHC 33.0     CMP:   Recent Labs   Lab 19  1606  19  0456   *   < > 258*   CALCIUM 8.7   < > 7.6*   ALBUMIN 3.8  --   --    PROT 6.7  --   --       < > 135*   K 4.7   < > 4.7   CO2 28   < > 22*   CL 99   < > 100   BUN 65*   < > 92*   CREATININE 4.0*   < > 4.8*   ALKPHOS 107  --   --    *  --   --    AST 39  --   --    BILITOT 0.7  --   --     < > = values in this interval not displayed.       Diagnostic  Results:  Labs: Reviewed  X-Ray: Reviewed        ASSESSMENT/PLAN:     Active Hospital Problems    Diagnosis  POA    *Severe persistent asthma with acute exacerbation [J45.51]  Yes    SOB (shortness of breath) [R06.02]  Yes    Acute on chronic diastolic congestive heart failure [I50.33]  Yes    Type 2 diabetes mellitus, without long-term current use of insulin [E11.9]  Yes    Hypertension due to end stage renal disease caused by type 2 diabetes mellitus, on dialysis [E11.22, I12.0, Z99.2, N18.6]  Not Applicable    Pulmonary hypertension [I27.20]  Yes    Persistent atrial fibrillation [I48.1]  Yes    ESRD (end stage renal disease) [N18.6]  Yes    Immune deficiency disorder [D84.9]  Yes    Secondary hyperparathyroidism [N25.81]  Yes    Anemia due to chronic kidney disease, on chronic dialysis [N18.6, D63.1, Z99.2]  Not Applicable    Vertigo [R42]  Yes      Resolved Hospital Problems   No resolved problems to display.         Thank you for allowing us to participate in the care of your patient. We will follow the patient and provide recommendations as needed.      Time spent seeing patient( greater than 1/2 spent in direct contact) :     Frankie Rojo MD  Nephrology  [facility]  [phone]

## 2019-03-21 NOTE — NURSING
Daughter, Crow, wanting to get update on her father. Wanting to know how severe the pt's regurgitation is, what to do to fix it, and how invasive to fix valve. Dr Guillen on unit. Dr Guillen spoke to patient's daughter.

## 2019-03-21 NOTE — TELEPHONE ENCOUNTER
----- Message from Magali Rocha sent at 3/21/2019  9:18 AM CDT -----  Type: Needs Medical Advice    Who Called:  Patient  Best Call Back Number: [phone]  Additional Information: Is in the hospital today and they are transferring him to [facility] due to heart problems. Call, if you can. He has a TCC visit scheduled tomorrow that needs to be canceled.

## 2019-03-21 NOTE — PROGRESS NOTES
Progress Note  Hospital Medicine  Patient Name:Micheal Hunt  MRN:  [id number]  Patient Class: IP- Inpatient  Admit Date: 3/18/2019  Length of Stay: 1 days  Expected Discharge Date:   Attending Physician: Charissa Olivas MD  Primary Care Provider:  Pk Lakhani MD    SUBJECTIVE:     Principal Problem: Severe persistent asthma with acute exacerbation  Initial history of present illness:  Micheal Hunt is a 78 y.o. male with HTN, HLD, CAD, LVH, ESRD stage V on dialysis (MW&F), mesenteric ischemia, asthma, and anemia who presents to the ED with acute t onset of difficulty breathing for 2 days. Patient presents to the emergency room department with similar complaints on admission dated 03/11/2019.  He reports he was feeling well when he was discharged from the hospital on March 15, 2019.  He went to dialysis straight from the hospital upon discharge and tolerated well.  He has reports increased shortness of breath dizziness and generalized weakness after dialysis.  He presents to the emergency room with complaints of shortness of breath, dizziness and weakness.  He reports he has been taking his nebulizers at home without any improvement of his shortness of breath. The patient called his Pulmonologist's office this morning and was referred to the ER for further evaluation. He was admitted 1 week ago for PNA on 3/11 and discharged 3 days ago on 3/15 to follow-up with his Pulmonologist Dr. Mihir Guillen on 3/22.   Laboratory data and emergency room reviewed.  BNP has improved since previous admission.  He states he is to have dialysis this morning and came to the hospital instead of dialysis due to feeling ill.    PMH/PSH/SH/FH/Meds: reviewed.    Symptoms/Review of Systems:  Patiently waiting for transfer to [facility] for left heart catheterization.  Blood glucose level remains high.  Gets easily breathless, reports no significant improvement in breathing. No chest pain or headache, fever or abdominal pain.      Diet:  Adequate intake.    Activity level:  Up with assist  Pain:  Patient reports no pain.       OBJECTIVE:   Vital Signs (Most Recent):      Temp: 96 °F (35.6 °C) (03/21/19 0756)  Pulse: 89 (03/21/19 0756)  Resp: 16 (03/21/19 0756)  BP: (!) 195/90 (03/21/19 0756)  SpO2: 97 % (03/21/19 0756)       Vital Signs Range (Last 24H):  Temp:  [96 °F (35.6 °C)-97.2 °F (36.2 °C)]   Pulse:  [63-93]   Resp:  [11-22]   BP: (130-196)/()   SpO2:  [96 %-99 %]     Weight: 107.9 kg (237 lb 14 oz)  Body mass index is 31.38 kg/m².    Intake/Output Summary (Last 24 hours) at 3/21/2019 0833  Last data filed at 3/21/2019 0700  Gross per 24 hour   Intake 500 ml   Output 3325 ml   Net -2825 ml     Physical Examination:  General appearance: well developed, appears stated age  Head: normocephalic, atraumatic  Eyes:  conjunctivae/corneas clear. PERRL.  Nose: Nares normal. Septum midline.  Throat: lips, mucosa, and tongue normal; teeth and gums normal, no throat erythema.  Neck: supple, symmetrical, trachea midline, no JVD and thyroid not enlarged, symmetric, no tenderness/mass/nodules  Lungs:  Decreased air movement bilaterally, scattered rhonchi bilaterally.  Chest wall: no tenderness  Heart: regular rate and rhythm, S1, S2 normal, no murmur, click, rub or gallop  Abdomen: soft, non-tender non-distented; bowel sounds normal; no masses,  no organomegaly  Extremities: no cyanosis, clubbing or edema.   Pulses: 2+ and symmetric  Skin: Skin color, texture, turgor normal. No rashes or lesions.  Lymph nodes: Cervical, supraclavicular, and axillary nodes normal.  Neurologic: Normal strength and tone. No focal numbness or weakness. CNII-XII intact.      CBC:  Recent Labs   Lab 03/19/19  0428 03/20/19  0421 03/21/19  0456   WBC 7.00 8.90 9.00   RBC 3.44* 3.47* 3.41*   HGB 10.7* 10.8* 10.7*   HCT 32.5* 33.0* 32.3*   * 100* 96*   MCV 95 95 95   MCH 31.1* 31.2* 31.3*   MCHC 33.0 32.7 33.0   [facility]  Recent Labs   Lab 03/15/19  0617   03/19/19  0428 03/19/19  1126 03/20/19  0421 03/21/19  0456   *   < > 286* 495* 469* 258*   *   < > 136  --  131* 135*   K 4.9   < > 5.0  --  5.0 4.7      < > 102  --  98 100   CO2 23   < > 21*  --  20* 22*   *   < > 78*  --  106* 92*   CREATININE 5.1*   < > 4.7*  --  5.4* 4.8*   CALCIUM 7.5*   < > 7.9*  --  7.7* 7.6*   MG 2.4  --   --   --   --   --     < > = values in this interval not displayed.     Lab Results   Component Value Date    INR 2.7 (H) 03/21/2019    INR 4.2 (H) 03/20/2019    INR 3.3 (H) 03/19/2019     Diagnostic Results:  Microbiology Results (last 7 days)     ** No results found for the last 168 hours. **         Chest X-Ray: Mild pulmonary infiltrates favoring pneumonia. Cardiomegaly, atherosclerosis and prior sternotomy.    Assessment/Plan:     * Asthma exacerbation  Community-acquired pneumonia     Supplemental O2 via nasal canula; titrate O2 saturation to >92%.   On IV Solu-Medrol 40 mg daily.  Follow Pulmonary recommendation.  Continue beta 2 agonist bronchodilator treatments.   Continue PO antibiotics - azithromycin.  Check sputum GS and Cx.   Continue routine medications as before.       Steroid induced hyperglycemia  Check hemoglobin A1c.  Start Levemir 8 units subcu daily.  Check blood glucose level q AC/HS.  Use Novolog Insulin Sliding Scale as needed.   Continue American Diabetic Association 1800 Kcal diet.         End-stage renal     Continue Chronic hemodialysis. Monitor daily electrolytes and defer dialysis orders to nephrology.      Anemia of chronic disease due to end-stage renal disease     Chronic problem. Will continue chronic medications and monitor for any changes, adjusting as needed.      Benign prostatic hyperplasia without lower urinary tract symptoms     Chronic problem.  Continue Flomax and Proscar.      Gout, arthritis     Chronic. Continue Allopurinol.      CAD (coronary artery disease), 2V CABG 2007     History of CAD s/p CABG.  No s/s of  angina.  Continue ASA, Statin, and BB.  Telemetry monitoring.  Monitor for s/s of ACS.  Follow Cardiology recommendations.       Hyperlipidemia, baseline      Chronic problem. Continue Statin therapy.                Lab Results   Component Value Date     LDLCALC 46.4 (L) 07/26/2018       Essential hypertension, uncontrolled     Chronic problem. Controlled. Continue home medications and monitor b/p closely, adjusting as necessary.  Increased Coreg 12.5 mg BID.      Disposition:  Awaiting transfer to [facility] for left heart catheterization when bed available.    VTE Risk Mitigation (From admission, onward)        Ordered     IP VTE HIGH RISK PATIENT  Once      03/18/19 1330     Reason for no Mechanical VTE Prophylaxis  Once      03/18/19 1330        Charissa Olivas MD  Department of Hospital Medicine   [facility]

## 2019-03-21 NOTE — PLAN OF CARE
03/20/19 1924   Patient Assessment/Suction   Level of Consciousness (AVPU) alert   Respiratory Effort Unlabored   Expansion/Accessory Muscles/Retractions no use of accessory muscles   All Lung Fields Breath Sounds Anterior:;coarse;diminished   Cough Frequency infrequent   Cough Type dry   PRE-TX-O2   O2 Device (Oxygen Therapy) nasal cannula   $ Is the patient on Low Flow Oxygen? Yes   Flow (L/min) 2   Oxygen Concentration (%) 28   SpO2 99 %   Pulse Oximetry Type Intermittent   $ Pulse Oximetry - Multiple Charge Pulse Oximetry - Multiple   Pulse 75   Resp 16   Aerosol Therapy   $ Aerosol Therapy Charges Aerosol Treatment   Respiratory Treatment Status (SVN) given   Treatment Route (SVN) mouthpiece   Patient Position (SVN) sitting on edge of bed   Post Treatment Assessment (SVN) breath sounds unchanged   Signs of Intolerance (SVN) none   Breath Sounds Post-Respiratory Treatment   Throughout All Fields Post-Treatment All Fields   Throughout All Fields Post-Treatment no change   Post-treatment Heart Rate (beats/min) 77   Post-treatment Resp Rate (breaths/min) 16   Ready to Wean/Extubation Screen   FIO2<=50 (chart decimal) 0.28     Pt is on 2 L NC sating 99%. Pt receives Q4 Duoneb aerosol treatments. No distress shown. Will continue to monitor

## 2019-03-21 NOTE — PROGRESS NOTES
Progress Note  PULMONARY    Admit Date: 3/18/2019   03/21/2019      SUBJECTIVE:     March 20, 2019-patient continues to have severe burning chest pain and severe dyspnea exertion.  March 21, no new c/o     PFSH and Allergies reviewed.    OBJECTIVE:     Vitals (Most recent):  Vitals:    03/21/19 1524   BP:    Pulse: 93   Resp: 17   Temp:        Vitals (24 hour range):  Temp:  [96 °F (35.6 °C)-96.1 °F (35.6 °C)]   Pulse:  [70-93]   Resp:  [16-22]   BP: (151-195)/(77-90)   SpO2:  [95 %-99 %]       Intake/Output Summary (Last 24 hours) at 3/21/2019 1528  Last data filed at 3/21/2019 1200  Gross per 24 hour   Intake 640 ml   Output 825 ml   Net -185 ml          Physical Exam:  The patient's neuro status (alertness,orientation,cognitive function,motor skills,), pharyngeal exam (oral lesions, hygiene, abn dentition,), Neck (jvd,mass,thyroid,nodes in neck and above/below clavicle),RESPIRATORY(symmetry,effort,fremitus,percussion,auscultation),  Cor(rhythm,heart tones including gallops,perfusion,edema)ABD(distention,hepatic&splenomegaly,tenderness,masses), Skin(rash,cyanosis),Psyc(affect,judgement,).  Exam negative except for these pertinent findings:    Alert, faint wheeze, symmetric chest, no distress, normal percussion, normal fremitus, no edema    Radiographs reviewed: view by direct vision -vascular congestion seen on chest x-ray at admit, no change  Results for orders placed during the hospital encounter of 03/11/19   X-Ray Chest 1 View    Narrative EXAMINATION:  XR CHEST 1 VIEW    CLINICAL HISTORY:  post HD;    TECHNIQUE:  Single frontal view of the chest was performed.    COMPARISON:  3/11/2019    FINDINGS:  The heart is enlarged.  The cardiomediastinal silhouette is stable.  There is no confluent infiltrate.  There is no pleural effusion.  There are median sternotomy sutures      Impression Cardiomegaly.  No acute cardiopulmonary disease      Electronically signed by: Mayuri Castro  MD  Date:    03/14/2019  Time:    10:37   ]    6 min walk study was done March 19, 2019.  Baseline room air saturation was 97%.  With 6 min of walking O2 sat varied from 94-98%.  There was no severe desaturation.  Patient walked 340 m or 69% of the reference distance.        Spirometry was on March 19, 2019. the FEV1 to FVC ratio was 83%.  There is no airflow obstruction.  The FEV1 measures 66% with a forced vital capacity 58%.  Patient may have air trapping or restrictive disease, clinical correlation recommended.     Spirometry is abnormal.  Labs     Recent Labs   Lab 03/21/19  0456   WBC 9.00   HGB 10.7*   HCT 32.3*   PLT 96*     Recent Labs   Lab 03/20/19  2320 03/21/19  0456   NA  --  135*   K  --  4.7   CL  --  100   CO2  --  22*   BUN  --  92*   CREATININE  --  4.8*   GLU  --  258*   CALCIUM  --  7.6*   INR  --  2.7*   TROPONINI 0.093* 0.098*     --    CPKMB 4.9  --    MB 3.1  --    No results for input(s): PH, PCO2, PO2, HCO3 in the last 24 hours.  Microbiology Results (last 7 days)     ** No results found for the last 168 hours. **          Impression:  Active Hospital Problems    Diagnosis  POA    *Severe persistent asthma with acute exacerbation [J45.51]  Yes    SOB (shortness of breath) [R06.02]  Yes    Acute on chronic diastolic congestive heart failure [I50.33]  Yes    Type 2 diabetes mellitus, without long-term current use of insulin [E11.9]  Yes    Hypertension due to end stage renal disease caused by type 2 diabetes mellitus, on dialysis [E11.22, I12.0, Z99.2, N18.6]  Not Applicable    Pulmonary hypertension [I27.20]  Yes    Persistent atrial fibrillation [I48.1]  Yes    ESRD (end stage renal disease) [N18.6]  Yes    Immune deficiency disorder [D84.9]  Yes    Secondary hyperparathyroidism [N25.81]  Yes    Anemia due to chronic kidney disease, on chronic dialysis [N18.6, D63.1, Z99.2]  Not Applicable    Vertigo [R42]  Yes      Resolved Hospital Problems   No resolved problems to  display.               Plan:     March 20th-his baseline FEV1 was 71% of predicted last year and currently he measures 66%.  He maintains some faint expiratory wheezes in the posterior lung bases refractory to steroids and bronchodilators.  Case is discussed with Cardiology, and would be inclined to believe his heart may be the source of his problems particularly with his exertional burning chest pain etc his respiratory reserve should be more than adequate for any needed cardiac procedures including surgery.    Will decrease steroids from 160 a day to 40 a day    March 21, discussed with daughter.  Faint wheeze, today, stable                              .

## 2019-03-21 NOTE — PLAN OF CARE
"Problem: Adult Inpatient Plan of Care  Goal: Plan of Care Review  Outcome: Ongoing (interventions implemented as appropriate)  Pt made aware of new A1c level. Pt continues to state "I am not diabetic" explained what the A1c was,  A 3 month level for diabetes. When told he would be started on oral hypoglycemic medication pt stated " I will refuse to take them I was not diabetic when I came here. Explained 10 days ago A1c was 7.9, Now it is 8.3. Consult for diabetic educator and dietician put in. Pt's blood glucose was >400 x 2 after pt drank a boost. No glucerna in fridge. Called dietary to bring some. No glucerna yet. Will recheck blood glucose after supper closer to shift change.        "

## 2019-03-21 NOTE — PLAN OF CARE
Called the [facility] (#[phone]) for status on bed assignment; no available bed at this time. They will contact us when they have an assignment....ANTONIO Brar CM

## 2019-03-21 NOTE — NURSING
"Pt refused bed alarm, informed him on policy and safety, still refused having it on. Got charge nurse to come talk to the pt about policies and safety, pt still refused bed alarm. States he "gets up to much during the night to pee, I just need to stand to pee" Urinal at the bedside, will check on him frequently.   "

## 2019-03-22 ENCOUNTER — HOSPITAL ENCOUNTER (INPATIENT)
Facility: HOSPITAL | Age: 79
LOS: 18 days | Discharge: NURSING, REHAB OR LONG-TERM CARE | DRG: 280 | End: 2019-04-09
Attending: HOSPITALIST | Admitting: HOSPITALIST
Payer: MEDICARE

## 2019-03-22 ENCOUNTER — TELEPHONE (OUTPATIENT)
Dept: FAMILY MEDICINE | Facility: CLINIC | Age: 79
End: 2019-03-22

## 2019-03-22 VITALS
RESPIRATION RATE: 20 BRPM | WEIGHT: 237.44 LBS | HEART RATE: 72 BPM | OXYGEN SATURATION: 98 % | TEMPERATURE: 97 F | BODY MASS INDEX: 31.47 KG/M2 | DIASTOLIC BLOOD PRESSURE: 90 MMHG | SYSTOLIC BLOOD PRESSURE: 128 MMHG | HEIGHT: 73 IN

## 2019-03-22 DIAGNOSIS — R06.2 WHEEZING: ICD-10-CM

## 2019-03-22 DIAGNOSIS — N18.6 ESRD (END STAGE RENAL DISEASE) ON DIALYSIS: ICD-10-CM

## 2019-03-22 DIAGNOSIS — N18.6 ESRD (END STAGE RENAL DISEASE): ICD-10-CM

## 2019-03-22 DIAGNOSIS — Z99.2 HYPERTENSION DUE TO END STAGE RENAL DISEASE CAUSED BY TYPE 2 DIABETES MELLITUS, ON DIALYSIS: ICD-10-CM

## 2019-03-22 DIAGNOSIS — I25.119 CORONARY ARTERY DISEASE INVOLVING NATIVE CORONARY ARTERY OF NATIVE HEART WITH ANGINA PECTORIS: ICD-10-CM

## 2019-03-22 DIAGNOSIS — I50.9 CONGESTIVE HEART FAILURE: ICD-10-CM

## 2019-03-22 DIAGNOSIS — J45.31 MILD PERSISTENT ASTHMA WITH ACUTE EXACERBATION: ICD-10-CM

## 2019-03-22 DIAGNOSIS — I21.4 NSTEMI (NON-ST ELEVATED MYOCARDIAL INFARCTION): ICD-10-CM

## 2019-03-22 DIAGNOSIS — I63.532 ACUTE ISCHEMIC LEFT PCA STROKE: ICD-10-CM

## 2019-03-22 DIAGNOSIS — E11.22 HYPERTENSION DUE TO END STAGE RENAL DISEASE CAUSED BY TYPE 2 DIABETES MELLITUS, ON DIALYSIS: ICD-10-CM

## 2019-03-22 DIAGNOSIS — R06.02 SOB (SHORTNESS OF BREATH): ICD-10-CM

## 2019-03-22 DIAGNOSIS — I12.0 HYPERTENSION DUE TO END STAGE RENAL DISEASE CAUSED BY TYPE 2 DIABETES MELLITUS, ON DIALYSIS: ICD-10-CM

## 2019-03-22 DIAGNOSIS — S30.1XXA GROIN HEMATOMA: ICD-10-CM

## 2019-03-22 DIAGNOSIS — I48.0 PAROXYSMAL ATRIAL FIBRILLATION: ICD-10-CM

## 2019-03-22 DIAGNOSIS — I82.409 DVT (DEEP VENOUS THROMBOSIS): ICD-10-CM

## 2019-03-22 DIAGNOSIS — Q21.12 PATENT FORAMEN OVALE: ICD-10-CM

## 2019-03-22 DIAGNOSIS — I10 ESSENTIAL HYPERTENSION: ICD-10-CM

## 2019-03-22 DIAGNOSIS — E78.00 PURE HYPERCHOLESTEROLEMIA: ICD-10-CM

## 2019-03-22 DIAGNOSIS — I65.23 BILATERAL CAROTID ARTERY STENOSIS: ICD-10-CM

## 2019-03-22 DIAGNOSIS — I97.410 FEMORAL ARTERY HEMATOMA COMPLICATING CARDIAC CATHETERIZATION: ICD-10-CM

## 2019-03-22 DIAGNOSIS — G93.6 CYTOTOXIC CEREBRAL EDEMA: ICD-10-CM

## 2019-03-22 DIAGNOSIS — N18.6 HYPERTENSION DUE TO END STAGE RENAL DISEASE CAUSED BY TYPE 2 DIABETES MELLITUS, ON DIALYSIS: ICD-10-CM

## 2019-03-22 DIAGNOSIS — R47.1 DYSARTHRIA DUE TO ACUTE CEREBROVASCULAR ACCIDENT (CVA): ICD-10-CM

## 2019-03-22 DIAGNOSIS — I63.432 EMBOLIC STROKE INVOLVING LEFT POSTERIOR CEREBRAL ARTERY: ICD-10-CM

## 2019-03-22 DIAGNOSIS — I20.0 UNSTABLE ANGINA: ICD-10-CM

## 2019-03-22 DIAGNOSIS — T79.A0XA COMPARTMENT SYNDROME: ICD-10-CM

## 2019-03-22 DIAGNOSIS — I63.9 DYSARTHRIA DUE TO ACUTE CEREBROVASCULAR ACCIDENT (CVA): ICD-10-CM

## 2019-03-22 DIAGNOSIS — L03.113 CELLULITIS OF RIGHT ARM: ICD-10-CM

## 2019-03-22 DIAGNOSIS — I63.332 THROMBOTIC STROKE INVOLVING LEFT POSTERIOR CEREBRAL ARTERY: Primary | ICD-10-CM

## 2019-03-22 DIAGNOSIS — Z99.2 ESRD (END STAGE RENAL DISEASE) ON DIALYSIS: ICD-10-CM

## 2019-03-22 LAB
ANION GAP SERPL CALC-SCNC: 12 MMOL/L
BASOPHILS # BLD AUTO: 0 K/UL
BASOPHILS NFR BLD: 0.2 %
BNP SERPL-MCNC: 714 PG/ML
BUN SERPL-MCNC: 107 MG/DL
CALCIUM SERPL-MCNC: 7.4 MG/DL
CHLORIDE SERPL-SCNC: 98 MMOL/L
CO2 SERPL-SCNC: 23 MMOL/L
CREAT SERPL-MCNC: 5.6 MG/DL
DIFFERENTIAL METHOD: ABNORMAL
EOSINOPHIL # BLD AUTO: 0 K/UL
EOSINOPHIL NFR BLD: 0.1 %
ERYTHROCYTE [DISTWIDTH] IN BLOOD BY AUTOMATED COUNT: 17.7 %
EST. GFR  (AFRICAN AMERICAN): 10 ML/MIN/1.73 M^2
EST. GFR  (NON AFRICAN AMERICAN): 9 ML/MIN/1.73 M^2
GLUCOSE SERPL-MCNC: 181 MG/DL
HCT VFR BLD AUTO: 34.8 %
HGB BLD-MCNC: 11.5 G/DL
INR PPP: 1.8
LYMPHOCYTES # BLD AUTO: 1.2 K/UL
LYMPHOCYTES NFR BLD: 12.6 %
MCH RBC QN AUTO: 31.1 PG
MCHC RBC AUTO-ENTMCNC: 33 G/DL
MCV RBC AUTO: 95 FL
MONOCYTES # BLD AUTO: 0.5 K/UL
MONOCYTES NFR BLD: 5.7 %
NEUTROPHILS # BLD AUTO: 7.4 K/UL
NEUTROPHILS NFR BLD: 81.4 %
PLATELET # BLD AUTO: 105 K/UL
PMV BLD AUTO: 8.4 FL
POCT GLUCOSE: 174 MG/DL (ref 70–110)
POCT GLUCOSE: 243 MG/DL (ref 70–110)
POCT GLUCOSE: 300 MG/DL (ref 70–110)
POCT GLUCOSE: 393 MG/DL (ref 70–110)
POCT GLUCOSE: >500 MG/DL (ref 70–110)
POTASSIUM SERPL-SCNC: 4.4 MMOL/L
PROTHROMBIN TIME: 18.7 SEC
RBC # BLD AUTO: 3.68 M/UL
SODIUM SERPL-SCNC: 133 MMOL/L
TROPONIN I SERPL DL<=0.01 NG/ML-MCNC: 0.07 NG/ML
WBC # BLD AUTO: 9.1 K/UL

## 2019-03-22 PROCEDURE — 85025 COMPLETE CBC W/AUTO DIFF WBC: CPT

## 2019-03-22 PROCEDURE — 63600175 PHARM REV CODE 636 W HCPCS: Performed by: INTERNAL MEDICINE

## 2019-03-22 PROCEDURE — 80100016 HC MAINTENANCE HEMODIALYSIS

## 2019-03-22 PROCEDURE — 99223 1ST HOSP IP/OBS HIGH 75: CPT | Mod: ,,, | Performed by: INTERNAL MEDICINE

## 2019-03-22 PROCEDURE — 25000242 PHARM REV CODE 250 ALT 637 W/ HCPCS: Performed by: NURSE PRACTITIONER

## 2019-03-22 PROCEDURE — 94640 AIRWAY INHALATION TREATMENT: CPT

## 2019-03-22 PROCEDURE — 99223 PR INITIAL HOSPITAL CARE,LEVL III: ICD-10-PCS | Mod: ,,, | Performed by: INTERNAL MEDICINE

## 2019-03-22 PROCEDURE — 25000003 PHARM REV CODE 250: Performed by: INTERNAL MEDICINE

## 2019-03-22 PROCEDURE — 36415 COLL VENOUS BLD VENIPUNCTURE: CPT

## 2019-03-22 PROCEDURE — 25000003 PHARM REV CODE 250: Performed by: SPECIALIST

## 2019-03-22 PROCEDURE — 84484 ASSAY OF TROPONIN QUANT: CPT

## 2019-03-22 PROCEDURE — 94761 N-INVAS EAR/PLS OXIMETRY MLT: CPT

## 2019-03-22 PROCEDURE — 80048 BASIC METABOLIC PNL TOTAL CA: CPT

## 2019-03-22 PROCEDURE — S5571 INSULIN DISPOS PEN 3 ML: HCPCS | Performed by: INTERNAL MEDICINE

## 2019-03-22 PROCEDURE — 20600001 HC STEP DOWN PRIVATE ROOM

## 2019-03-22 PROCEDURE — 99231 PR SUBSEQUENT HOSPITAL CARE,LEVL I: ICD-10-PCS | Mod: ,,, | Performed by: INTERNAL MEDICINE

## 2019-03-22 PROCEDURE — 27000221 HC OXYGEN, UP TO 24 HOURS

## 2019-03-22 PROCEDURE — 25000003 PHARM REV CODE 250: Performed by: NURSE PRACTITIONER

## 2019-03-22 PROCEDURE — 97803 MED NUTRITION INDIV SUBSEQ: CPT

## 2019-03-22 PROCEDURE — 83880 ASSAY OF NATRIURETIC PEPTIDE: CPT

## 2019-03-22 PROCEDURE — 85610 PROTHROMBIN TIME: CPT

## 2019-03-22 PROCEDURE — 99231 SBSQ HOSP IP/OBS SF/LOW 25: CPT | Mod: ,,, | Performed by: INTERNAL MEDICINE

## 2019-03-22 RX ORDER — SERTRALINE HYDROCHLORIDE 50 MG/1
50 TABLET, FILM COATED ORAL DAILY
Status: DISCONTINUED | OUTPATIENT
Start: 2019-03-29 | End: 2019-03-26

## 2019-03-22 RX ORDER — INSULIN ASPART 100 [IU]/ML
1-10 INJECTION, SOLUTION INTRAVENOUS; SUBCUTANEOUS
Status: DISCONTINUED | OUTPATIENT
Start: 2019-03-22 | End: 2019-04-09 | Stop reason: HOSPADM

## 2019-03-22 RX ORDER — LANOLIN ALCOHOL/MO/W.PET/CERES
800 CREAM (GRAM) TOPICAL
Status: DISCONTINUED | OUTPATIENT
Start: 2019-03-22 | End: 2019-03-26

## 2019-03-22 RX ORDER — ONDANSETRON 4 MG/1
8 TABLET, FILM COATED ORAL EVERY 6 HOURS PRN
Status: DISCONTINUED | OUTPATIENT
Start: 2019-03-22 | End: 2019-04-09 | Stop reason: HOSPADM

## 2019-03-22 RX ORDER — ASPIRIN 81 MG/1
81 TABLET ORAL DAILY
Status: DISCONTINUED | OUTPATIENT
Start: 2019-03-23 | End: 2019-03-29

## 2019-03-22 RX ORDER — PANTOPRAZOLE SODIUM 40 MG/1
40 TABLET, DELAYED RELEASE ORAL DAILY
Status: DISCONTINUED | OUTPATIENT
Start: 2019-03-23 | End: 2019-03-25

## 2019-03-22 RX ORDER — TORSEMIDE 20 MG/1
20 TABLET ORAL 2 TIMES DAILY
Status: DISCONTINUED | OUTPATIENT
Start: 2019-03-22 | End: 2019-03-23

## 2019-03-22 RX ORDER — IPRATROPIUM BROMIDE AND ALBUTEROL SULFATE 2.5; .5 MG/3ML; MG/3ML
3 SOLUTION RESPIRATORY (INHALATION) EVERY 6 HOURS PRN
Status: DISCONTINUED | OUTPATIENT
Start: 2019-03-22 | End: 2019-03-26

## 2019-03-22 RX ORDER — FLUTICASONE PROPIONATE 50 MCG
2 SPRAY, SUSPENSION (ML) NASAL DAILY
Status: DISCONTINUED | OUTPATIENT
Start: 2019-03-23 | End: 2019-03-26

## 2019-03-22 RX ORDER — SERTRALINE HYDROCHLORIDE 25 MG/1
25 TABLET, FILM COATED ORAL DAILY
Status: DISCONTINUED | OUTPATIENT
Start: 2019-03-22 | End: 2019-03-22 | Stop reason: HOSPADM

## 2019-03-22 RX ORDER — SERTRALINE HYDROCHLORIDE 50 MG/1
50 TABLET, FILM COATED ORAL DAILY
Status: DISCONTINUED | OUTPATIENT
Start: 2019-03-29 | End: 2019-03-22 | Stop reason: HOSPADM

## 2019-03-22 RX ORDER — NITROGLYCERIN 0.4 MG/1
0.4 TABLET SUBLINGUAL EVERY 5 MIN PRN
Status: DISCONTINUED | OUTPATIENT
Start: 2019-03-22 | End: 2019-04-09 | Stop reason: HOSPADM

## 2019-03-22 RX ORDER — ALLOPURINOL 100 MG/1
100 TABLET ORAL DAILY
Status: DISCONTINUED | OUTPATIENT
Start: 2019-03-23 | End: 2019-04-09 | Stop reason: HOSPADM

## 2019-03-22 RX ORDER — LOSARTAN POTASSIUM 50 MG/1
100 TABLET ORAL NIGHTLY
Status: DISCONTINUED | OUTPATIENT
Start: 2019-03-22 | End: 2019-04-09 | Stop reason: HOSPADM

## 2019-03-22 RX ORDER — IBUPROFEN 200 MG
24 TABLET ORAL
Status: DISCONTINUED | OUTPATIENT
Start: 2019-03-22 | End: 2019-04-09 | Stop reason: HOSPADM

## 2019-03-22 RX ORDER — IBUPROFEN 200 MG
16 TABLET ORAL
Status: DISCONTINUED | OUTPATIENT
Start: 2019-03-22 | End: 2019-04-09 | Stop reason: HOSPADM

## 2019-03-22 RX ORDER — SERTRALINE HYDROCHLORIDE 25 MG/1
25 TABLET, FILM COATED ORAL DAILY
Status: DISCONTINUED | OUTPATIENT
Start: 2019-03-23 | End: 2019-03-26

## 2019-03-22 RX ORDER — SODIUM CHLORIDE 9 MG/ML
INJECTION, SOLUTION INTRAVENOUS
Status: DISCONTINUED | OUTPATIENT
Start: 2019-03-22 | End: 2019-03-26

## 2019-03-22 RX ORDER — MECLIZINE HYDROCHLORIDE 25 MG/1
25 TABLET ORAL 3 TIMES DAILY PRN
Status: DISCONTINUED | OUTPATIENT
Start: 2019-03-22 | End: 2019-03-24

## 2019-03-22 RX ORDER — GLUCAGON 1 MG
1 KIT INJECTION
Status: DISCONTINUED | OUTPATIENT
Start: 2019-03-22 | End: 2019-04-09 | Stop reason: HOSPADM

## 2019-03-22 RX ORDER — IPRATROPIUM BROMIDE AND ALBUTEROL SULFATE 2.5; .5 MG/3ML; MG/3ML
3 SOLUTION RESPIRATORY (INHALATION) EVERY 4 HOURS
Status: DISCONTINUED | OUTPATIENT
Start: 2019-03-23 | End: 2019-03-26

## 2019-03-22 RX ORDER — TAMSULOSIN HYDROCHLORIDE 0.4 MG/1
1 CAPSULE ORAL DAILY
Status: DISCONTINUED | OUTPATIENT
Start: 2019-03-23 | End: 2019-04-08

## 2019-03-22 RX ORDER — GABAPENTIN 300 MG/1
300 CAPSULE ORAL NIGHTLY
Status: DISCONTINUED | OUTPATIENT
Start: 2019-03-22 | End: 2019-04-09 | Stop reason: HOSPADM

## 2019-03-22 RX ORDER — HEPARIN SODIUM 5000 [USP'U]/ML
5000 INJECTION, SOLUTION INTRAVENOUS; SUBCUTANEOUS EVERY 12 HOURS
Status: DISCONTINUED | OUTPATIENT
Start: 2019-03-22 | End: 2019-03-23

## 2019-03-22 RX ORDER — ISOSORBIDE MONONITRATE 10 MG/1
10 TABLET ORAL NIGHTLY
Status: DISCONTINUED | OUTPATIENT
Start: 2019-03-22 | End: 2019-03-22

## 2019-03-22 RX ORDER — FINASTERIDE 5 MG/1
5 TABLET, FILM COATED ORAL DAILY
Status: DISCONTINUED | OUTPATIENT
Start: 2019-03-23 | End: 2019-04-09 | Stop reason: HOSPADM

## 2019-03-22 RX ORDER — MIRTAZAPINE 7.5 MG/1
7.5 TABLET, FILM COATED ORAL NIGHTLY
Status: DISCONTINUED | OUTPATIENT
Start: 2019-03-22 | End: 2019-03-26

## 2019-03-22 RX ORDER — PREDNISONE 20 MG/1
40 TABLET ORAL DAILY
Status: DISCONTINUED | OUTPATIENT
Start: 2019-03-23 | End: 2019-03-24

## 2019-03-22 RX ORDER — CARVEDILOL 25 MG/1
25 TABLET ORAL 2 TIMES DAILY WITH MEALS
Status: DISCONTINUED | OUTPATIENT
Start: 2019-03-23 | End: 2019-03-26

## 2019-03-22 RX ORDER — ACETAMINOPHEN 325 MG/1
650 TABLET ORAL EVERY 6 HOURS PRN
Status: DISCONTINUED | OUTPATIENT
Start: 2019-03-22 | End: 2019-04-09 | Stop reason: HOSPADM

## 2019-03-22 RX ORDER — HEPARIN SODIUM 5000 [USP'U]/ML
5000 INJECTION, SOLUTION INTRAVENOUS; SUBCUTANEOUS EVERY 12 HOURS
Status: DISCONTINUED | OUTPATIENT
Start: 2019-03-22 | End: 2019-03-22 | Stop reason: HOSPADM

## 2019-03-22 RX ORDER — SENNOSIDES 8.6 MG/1
8.6 TABLET ORAL DAILY PRN
Status: DISCONTINUED | OUTPATIENT
Start: 2019-03-22 | End: 2019-03-26

## 2019-03-22 RX ORDER — POTASSIUM CHLORIDE 20 MEQ/15ML
40 SOLUTION ORAL
Status: DISCONTINUED | OUTPATIENT
Start: 2019-03-22 | End: 2019-03-26

## 2019-03-22 RX ORDER — AZITHROMYCIN 250 MG/1
500 TABLET, FILM COATED ORAL DAILY
Status: DISCONTINUED | OUTPATIENT
Start: 2019-03-23 | End: 2019-03-23

## 2019-03-22 RX ORDER — ATORVASTATIN CALCIUM 20 MG/1
80 TABLET, FILM COATED ORAL DAILY
Status: DISCONTINUED | OUTPATIENT
Start: 2019-03-22 | End: 2019-04-09 | Stop reason: HOSPADM

## 2019-03-22 RX ADMIN — TORSEMIDE 20 MG: 20 TABLET ORAL at 10:03

## 2019-03-22 RX ADMIN — HEPARIN SODIUM 5000 UNITS: 5000 INJECTION, SOLUTION INTRAVENOUS; SUBCUTANEOUS at 10:03

## 2019-03-22 RX ADMIN — IPRATROPIUM BROMIDE AND ALBUTEROL SULFATE 3 ML: .5; 3 SOLUTION RESPIRATORY (INHALATION) at 07:03

## 2019-03-22 RX ADMIN — INSULIN ASPART 4 UNITS: 100 INJECTION, SOLUTION INTRAVENOUS; SUBCUTANEOUS at 05:03

## 2019-03-22 RX ADMIN — IPRATROPIUM BROMIDE AND ALBUTEROL SULFATE 3 ML: .5; 3 SOLUTION RESPIRATORY (INHALATION) at 04:03

## 2019-03-22 RX ADMIN — INSULIN ASPART 4 UNITS: 100 INJECTION, SOLUTION INTRAVENOUS; SUBCUTANEOUS at 10:03

## 2019-03-22 RX ADMIN — CARVEDILOL 25 MG: 6.25 TABLET, FILM COATED ORAL at 10:03

## 2019-03-22 RX ADMIN — TAMSULOSIN HYDROCHLORIDE 0.4 MG: 0.4 CAPSULE ORAL at 10:03

## 2019-03-22 RX ADMIN — METHYLPREDNISOLONE SODIUM SUCCINATE 40 MG: 40 INJECTION, POWDER, FOR SOLUTION INTRAMUSCULAR; INTRAVENOUS at 11:03

## 2019-03-22 RX ADMIN — AZITHROMYCIN 500 MG: 250 TABLET, FILM COATED ORAL at 10:03

## 2019-03-22 RX ADMIN — IPRATROPIUM BROMIDE AND ALBUTEROL SULFATE 3 ML: .5; 3 SOLUTION RESPIRATORY (INHALATION) at 01:03

## 2019-03-22 RX ADMIN — INSULIN ASPART 6 UNITS: 100 INJECTION, SOLUTION INTRAVENOUS; SUBCUTANEOUS at 11:03

## 2019-03-22 RX ADMIN — ALLOPURINOL 100 MG: 100 TABLET ORAL at 10:03

## 2019-03-22 RX ADMIN — INSULIN ASPART 2 UNITS: 100 INJECTION, SOLUTION INTRAVENOUS; SUBCUTANEOUS at 05:03

## 2019-03-22 RX ADMIN — ATORVASTATIN CALCIUM 80 MG: 20 TABLET, FILM COATED ORAL at 10:03

## 2019-03-22 RX ADMIN — SERTRALINE HYDROCHLORIDE 25 MG: 25 TABLET ORAL at 11:03

## 2019-03-22 RX ADMIN — FINASTERIDE 5 MG: 5 TABLET, FILM COATED ORAL at 10:03

## 2019-03-22 RX ADMIN — FLUTICASONE PROPIONATE 100 MCG: 50 SPRAY, METERED NASAL at 10:03

## 2019-03-22 RX ADMIN — IPRATROPIUM BROMIDE AND ALBUTEROL SULFATE 3 ML: .5; 3 SOLUTION RESPIRATORY (INHALATION) at 11:03

## 2019-03-22 RX ADMIN — MIRTAZAPINE 7.5 MG: 7.5 TABLET ORAL at 10:03

## 2019-03-22 RX ADMIN — CARVEDILOL 25 MG: 6.25 TABLET, FILM COATED ORAL at 05:03

## 2019-03-22 RX ADMIN — INSULIN DETEMIR 8 UNITS: 100 INJECTION, SOLUTION SUBCUTANEOUS at 10:03

## 2019-03-22 RX ADMIN — ASPIRIN 81 MG: 81 TABLET, COATED ORAL at 10:03

## 2019-03-22 RX ADMIN — GABAPENTIN 300 MG: 300 CAPSULE ORAL at 10:03

## 2019-03-22 RX ADMIN — LOSARTAN POTASSIUM 100 MG: 50 TABLET, FILM COATED ORAL at 10:03

## 2019-03-22 NOTE — PLAN OF CARE
States his blood sugar problems are from steroids.  Written information given on diabetes self management and resources available.  Will follow as needed.

## 2019-03-22 NOTE — TELEPHONE ENCOUNTER
----- Message from Sara Coleman sent at 3/22/2019  7:41 AM CDT -----  Contact: Daughter-Crow  Daughter-Crow calling wanting to Dr regard pt who is in the hospital,concerning test results,have questions about the reason pt in the hospital,new diagnose they have given pt and if the Dr is aware of it....[phone]

## 2019-03-22 NOTE — PROGRESS NOTES
[facility]  Cardiology  Progress Note    Patient Name: Micheal Hunt  MRN: [id number]  Admission Date: 3/18/2019  Hospital Length of Stay: 2 days  Code Status: Full Code   Attending Provider:   Consulting Provider: ROSALBA Keller  Primary Care Physician: Pk Lakhani MD  Principal Problem:Severe persistent asthma with acute exacerbation    Subjective:   Atrial fib and SOB   Chief Complaint: pt has noted increased chest burning  Since hes been dialyzed av shunt laft arm ,  Pt has had hospitalizations for copd, chf,and in for same  This time   HPI: cabg in 2007  In [place] ,;Pt has never smoked and started with Sob ~ 2011  And exertional substernal burning  In past year.  2012  Cath: Ef 50%  LVEDP 14  Left main occluded,  Lima to lad open; Cx not evaluated    RCA not well visualized but SVG seen on ao root injection      Nuc EST early 2019 shows  Reversible ische    He has been on dialysis several years,  Atrial fib new  And coumadin recently started       Echo  1 week ago shows  Dilated LV  decresed EF, mild to moderate AS, + 3 AI, + 2 MR, PH   Enlarged LA     Interval Hx:  Still a bit depressed today. Still awaiting trfr to [facility] for higher level of medical mngt.  Was placed on Heparin yesterday while he waits for a bed and transfer.  A bit SOB sitting on edge of bed, does not have his oxygen on. Bilateral BS with left lung mid to base with course rhonchi. Will order a chest Xray and BNP.  Dr Hidalgo spoke with daughter Crwo via phone to update her on problems and plan to transfer to [facility] for further workup.  No other concerns voiced.    ROS:  As per above and previous ROS, otherwise negative.    Past Medical History:   Diagnosis Date    NICK (acute kidney injury) 7/29/2016    Allergy     Anemia, mild 12/15/2014    Arthritis     Gout    Benign essential HTN 3/27/2012    BMI 29.0-29.9,adult 5/10/2018    BPH (benign prostatic hyperplasia)     BPH (benign prostatic  hyperplasia)     CAD (coronary artery disease) 2006    Chronic kidney disease     due to ibuprofen    Colon polyp     CRF (chronic renal failure), stage 5     Diverticulosis     Gastritis     GERD (gastroesophageal reflux disease)     Gout     History of colon polyps 5/3/2018    HTN (hypertension) 3/27/2012    Hyperlipidemia     Hyperlipidemia     LLL pneumonia 6/14/2018    LVH (left ventricular hypertrophy)     Mesenteric ischemia     Murmur, cardiac 3/27/2012    GRICELDA (obstructive sleep apnea)     DOES NOT USE A MACHINE    Sinus problem     Syncope and collapse        Past Surgical History:   Procedure Laterality Date    APPENDECTOMY      BLOCK-NERVE Left 5/31/2016    Performed by [facility] at [facility] OR    CARDIAC CATHETERIZATION      COLONOSCOPY  2011    COLONOSCOPY N/A 5/3/2018    [facility] ENDO    COLONOSCOPY N/A 9/10/2015    [facility] at [facility]    CORONARY ARTERY BYPASS GRAFT  4/2007    x 1    CYSTOSCOPY N/A 8/30/2017    Performed by [facility]y [facility]ring [facility] at [facility] OR    CYSTOSCOPY N/A 11/10/2015    [facility] at [facility] OR    ESOPHAGOGASTRODUODENOSCOPY (EGD) N/A 1/4/2016    [facility] (2ND FLR)    ESOPHAGOGASTRODUODENOSCOPY (EGD) N/A 10/15/2014    Performed by [facility] at [facility] ENDO    INJECTION-STEROID-EPIDURAL-TRANSFORAMINAL Left 2/25/2016    [facility]formed by [facility] at [facility] OR    JOINT REPLACEMENT      left knee total replacement  X 3    mid leftt finger      from a cactuss    MOLE REMOVAL  2016    RADIOFREQUENCY THERMOCOAGULATION (RFTC)-NERVE-MEDIAN BRANCH-LUMBAR Left 4/11/2016    Performed by [facility] Leon [facility] at [facility] OR    rotative cuff      no rotative cuffs on bilat shoulders has pins     SHOULDER SURGERY      shoulder surgery bilat  RIGHT X 4; LEFT X 3    TRANSRECTAL ULTRASOUND GUIDED PROSTATE BIOPSY Bilateral 11/10/2015    Performed by  [facility] at [facility] OR    ULTRASOUND-ENDOSCOPIC-UPPER N/A 5/31/2017    [facility] (2ND FLR)    ULTRASOUND-ENDOSCOPIC-UPPER N/A 1/4/2016    [facility]formed [facility] at [facility] ENDO (2ND FLR)    ULTRASOUND-ENDOSCOPIC-UPPER N/A 1/16/2015    Performed by Jason Saleem MD at [facility] ENDO (2ND FLR)       Review of patient's allergies indicates:   Allergen Reactions    Ace inhibitors Other (See Comments)     Cough    Arb-angiotensin receptor antagonist Itching    Eplerenone Other (See Comments)     Marked bradycardia, 40, tiredness and weakness      Sulfa (sulfonamide antibiotics) Itching     Patient says this was 10 years ago and doesn't remember what happened       No current facility-administered medications on file prior to encounter.      Current Outpatient Medications on File Prior to Encounter   Medication Sig    albuterol (PROVENTIL/VENTOLIN HFA) 90 mcg/actuation inhaler 2 puffs every 4 hours as needed for cough, wheeze, or shortness of breath    albuterol-ipratropium (DUO-NEB) 2.5 mg-0.5 mg/3 mL nebulizer solution Take 3 mLs by nebulization every 6 (six) hours as needed for Wheezing or Shortness of Breath.    allopurinol (ZYLOPRIM) 100 MG tablet TAKE 1 TABLET BY MOUTH EVERY DAY    atorvastatin (LIPITOR) 80 MG tablet TAKE 1 TABLET (80 MG TOTAL) BY MOUTH ONCE DAILY.    budesonide-formoterol 160-4.5 mcg (SYMBICORT) 160-4.5 mcg/actuation HFAA Inhale 2 puffs into the lungs every 12 (twelve) hours. Controller    carvedilol (COREG) 6.25 MG tablet Take 1 tablet (6.25 mg total) by mouth 2 (two) times daily with meals.    celecoxib (CELEBREX) 200 MG capsule Take 1 capsule (200 mg total) by mouth once daily.    finasteride (PROSCAR) 5 mg tablet TAKE 1 TABLET ONCE DAILY.    gabapentin (NEURONTIN) 300 MG capsule Take 1 capsule (300 mg total) by mouth every evening.    isosorbide mononitrate (ISMO,MONOKET) 10 mg tablet TAKE 1 TABLET BY MOUTH EVERY DAY IN THE EVENING     losartan (COZAAR) 100 MG tablet TAKE 1 TABLET BY MOUTH EVERY DAY    meclizine (ANTIVERT) 25 mg tablet Take 1 tablet (25 mg total) by mouth 3 (three) times daily as needed for Dizziness.    predniSONE (DELTASONE) 20 MG tablet 3 pills for 3 days, 2 pills for 3 days, 1 pill for 3 days, repeat if needed.    tamsulosin (FLOMAX) 0.4 mg Cap TAKE 1 CAPSULE BY MOUTH EVERY DAY    torsemide (DEMADEX) 20 MG Tab Take 1 tablet (20 mg total) by mouth 2 (two) times daily.    warfarin (COUMADIN) 7.5 MG tablet Take 1 tablet (7.5 mg total) by mouth Daily.    aspirin (ECOTRIN) 81 MG EC tablet Take 81 mg by mouth once daily.      fluticasone (FLONASE) 50 mcg/actuation nasal spray 2 sprays (100 mcg total) by Each Nare route once daily.    meclizine (ANTIVERT) 25 mg tablet TAKE 1 TABLET BY MOUTH THREE TIMES A DAY AS NEEDED    mirtazapine (REMERON) 7.5 MG Tab TAKE 1 TABLET BY MOUTH EVERY EVENING.    NITROSTAT 0.4 mg SL tablet PLACE 1 TABLET (0.4 MG TOTAL) UNDER THE TONGUE EVERY 5 (FIVE) MINUTES AS NEEDED FOR CHEST PAIN.    omeprazole (PRILOSEC) 20 MG capsule TAKE 1 CAPSULE BY MOUTH EVERY DAY    sodium chloride (SALINE NASAL MIST) 3 % Mist 1 spray by Nasal route 2 (two) times daily.    tiotropium bromide (SPIRIVA RESPIMAT) 1.25 mcg/actuation Mist Inhale 2.5 mcg into the lungs once daily. Controller    zolpidem (AMBIEN) 5 MG Tab TAKE 1 TABLET BY MOUTH EVERY EVENING AS NEEDED    zolpidem (AMBIEN) 5 MG Tab TAKE 1 TABLET BY MOUTH EVERY EVENING AS NEEDED     Family History     Problem Relation (Age of Onset)    Cancer Father    Heart disease Mother, Sister    Hypertension Brother    Pneumonia Sister    Stroke Sister    Sudden death Father        Tobacco Use    Smoking status: Never Smoker    Smokeless tobacco: Never Used   Substance and Sexual Activity    Alcohol use: No    Drug use: No    Sexual activity: Not on file       Objective:     Vital Signs (Most Recent):  Temp: 96 °F (35.6 °C) (03/22/19 0808)  Pulse: 75  (03/22/19 0808)  Resp: 20 (03/22/19 0808)  BP: (!) 143/97 (03/22/19 0808)  SpO2: 98 % (03/22/19 0808) Vital Signs (24h Range):  Temp:  [96 °F (35.6 °C)-96.7 °F (35.9 °C)] 96 °F (35.6 °C)  Pulse:  [73-93] 75  Resp:  [17-20] 20  SpO2:  [95 %-99 %] 98 %  BP: (136-173)/(75-97) 143/97     Weight: 107.7 kg (237 lb 7 oz)  Body mass index is 31.33 kg/m².    SpO2: 98 %  O2 Device (Oxygen Therapy): room air      Intake/Output Summary (Last 24 hours) at 3/22/2019 1118  Last data filed at 3/22/2019 0600  Gross per 24 hour   Intake 600 ml   Output --   Net 600 ml       Lines/Drains/Airways     Drain                 Hemodialysis AV Fistula Left upper arm -- days          Peripheral Intravenous Line                 Peripheral IV - Single Lumen 03/18/19 1207 Right Wrist 3 days                Physical Exam   Constitutional: He is oriented to person, place, and time. He appears well-developed and well-nourished.   HENT:   Head: Normocephalic.   Neck: Normal range of motion. Neck supple.   Cardiovascular:   /97, remains in AF rate controlled.   Pulmonary/Chest:   Left lung with mid to basal course rhonchi.    Abdominal: Soft. Bowel sounds are normal.   Musculoskeletal: Normal range of motion. He exhibits no edema.   Neurological: He is alert and oriented to person, place, and time.   Skin: Skin is warm and dry.     Significant Labs:   CMP   Recent Labs   Lab 03/21/19 0456 03/22/19  0921   * 133*   K 4.7 4.4    98   CO2 22* 23   * 181*   BUN 92* 107*   CREATININE 4.8* 5.6*   CALCIUM 7.6* 7.4*   ANIONGAP 13 12   ESTGFRAFRICA 12* 10*   EGFRNONAA 11* 9*    and CBC   Recent Labs   Lab 03/21/19 0456 03/22/19  0921   WBC 9.00 9.10   HGB 10.7* 11.5*   HCT 32.3* 34.8*   PLT 96* 105*       Significant Imaging:     C Xray 03/22/2019 - Continued mild cardiomegaly and prominence of the pulmonary vasculature without pulmonary edema today.  Prior sternotomy.  Atherosclerosis.  Assessment and Plan:     Acute on Chronic CHF  - To have complete Heart work up at main campus. Sounds a bit wet today and SOB. Ordered BNP and chest Xray.  down from 834. C Xray as above.    Persistant AF - Now on Heparin to have possible DCCV after complete heart work up complete. Coumadin stopped 03/20/2019.    HTN - Bp stable with it at 143/97, continue all current medications.    Subclavian steal - May need shunt from Left transferred to Right.    Asthma -     ESRD - nephrology unboard    DM 2 -     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, on dialysis [E11.22, I12.0, Z99.2, N18.6] 03/18/2019 Not Applicable    Pulmonary hypertension [I27.20] 03/18/2019 Yes    Persistent atrial fibrillation [I48.1] 03/12/2019 Yes    ESRD (end stage renal disease) [N18.6] 03/12/2019 Yes    Immune deficiency disorder [D84.9] 03/11/2019 Yes    Secondary hyperparathyroidism [N25.81] 08/29/2018 Yes    Anemia due to chronic kidney disease, on chronic dialysis [N18.6, D63.1, Z99.2] 02/25/2018 Not Applicable    Vertigo [R42] 03/27/2012 Yes      Problems Resolved During this Admission:       VTE Risk Mitigation (From admission, onward)        Ordered     heparin (porcine) injection 5,000 Units  Every 12 hours      03/22/19 0847     IP VTE HIGH RISK PATIENT  Once      03/18/19 1330     Reason for no Mechanical VTE Prophylaxis  Once      03/18/19 1330          Janine Umanzor, GAVIOTAP  Cardiology   [facility]

## 2019-03-22 NOTE — NURSING
Pt refuses to take coumadin stating I have too many food restrictions now, no coumadin. ecplained with A -fib there was a chance of clots. Pt states he understood.

## 2019-03-22 NOTE — CONSULTS
"                                                        Food & Nutrition                                                           Education    Diet Education: Diabetic/renal diet  Time Spent: 15 minutes  Learners: Patient      Nutrition Education provided with handouts: Yes and menus      Comments: Patient educated and handouts given previously but pt still with questions/concerns about his diet.  After educating pt 3 times in < 1 month I notice he repeats a lot of the same information every time, such as, "my daughter is a dietitian she called me from [place] and gave me a run down." Unsure how much information pt is retaining? He will need continued reinforcement. Gave additional handouts to help patient understand what he can and cannot order off the menu. Reviewed renal and reinforced that patient has had high blood sugar for at least 3 months as A1C was elevated. Pt had been denying that he had diabetes per RN. Will continue to follow up and answer pt questions.       All questions and concerns answered. Dietitian's contact information provided.       Follow-Up: Yes    Please Re-consult as needed        Thanks!    "

## 2019-03-22 NOTE — NURSING
Blood  sugar > 400 after drinking boost. Mo glucera, 10 units nololog given then recheck right hand 474 left hand >500. Dr Olivas called. Orders received and insulin given.

## 2019-03-22 NOTE — NURSING
Received call from transfer center. Pt now has a room number of 347A. Report to be called to [phone]. [facility] will send ambulance. Room number given to pt to call his daughters.

## 2019-03-22 NOTE — PLAN OF CARE
Problem: Adult Inpatient Plan of Care  Goal: Plan of Care Review  Outcome: Ongoing (interventions implemented as appropriate)  Pt back from dialysis. Pt requesting weight. Scale brought to room. Denies room. Blood sugar taken. Will give PRN insulin. Younger daughter and two grand daughter in to see pt this morning. They spent an hour with him cheering him up. New medication of zoloft given to pt.

## 2019-03-22 NOTE — PLAN OF CARE
03/22/19 0719   Patient Assessment/Suction   Level of Consciousness (AVPU) alert   Respiratory Effort Normal;Unlabored   Expansion/Accessory Muscles/Retractions no use of accessory muscles;no retractions   All Lung Fields Breath Sounds diminished   LLL Breath Sounds crackles   RLL Breath Sounds crackles   Rhythm/Pattern, Respiratory depth regular;pattern regular   Cough Frequency no cough   PRE-TX-O2   O2 Device (Oxygen Therapy) nasal cannula   $ Is the patient on Low Flow Oxygen? Yes   Flow (L/min) 2   Oxygen Concentration (%) 28   SpO2 98 %   Pulse Oximetry Type Intermittent   $ Pulse Oximetry - Multiple Charge Pulse Oximetry - Multiple   Pulse 78   Resp 20   Aerosol Therapy   $ Aerosol Therapy Charges Aerosol Treatment   Respiratory Treatment Status (SVN) given   Treatment Route (SVN) mouthpiece   Patient Position (SVN) HOB elevated   Post Treatment Assessment (SVN) breath sounds unchanged   Signs of Intolerance (SVN) none   Breath Sounds Post-Respiratory Treatment   Throughout All Fields Post-Treatment All Fields   Throughout All Fields Post-Treatment no change   Post-treatment Heart Rate (beats/min) 78   Post-treatment Resp Rate (breaths/min) 20   Ready to Wean/Extubation Screen   FIO2<=50 (chart decimal) 0.28   PT RECEIVING AEROSOL TXS  AND OXYGEN.

## 2019-03-22 NOTE — TELEPHONE ENCOUNTER
Call placed to patient's daughter (Crow) for notification that Dr. Lakhani does not have hospital privileges/call office to schedule hospital follow up once discharged. No answer received. Left message.

## 2019-03-22 NOTE — PROGRESS NOTES
Progress Note  Hospital Medicine  Patient Name:Micheal Hunt  MRN:  [id number]  Patient Class: IP- Inpatient  Admit Date: 3/18/2019  Length of Stay: 2 days  Expected Discharge Date:   Attending Physician: Charissa Olivas MD  Primary Care Provider:  Pk Lakhani MD    SUBJECTIVE:     Principal Problem: Severe persistent asthma with acute exacerbation  Initial history of present illness:  Micheal Hunt is a 78 y.o. male with HTN, HLD, CAD, LVH, ESRD stage V on dialysis (MW&F), mesenteric ischemia, asthma, and anemia who presents to the ED with acute t onset of difficulty breathing for 2 days. Patient presents to the emergency room department with similar complaints on admission dated 03/11/2019.  He reports he was feeling well when he was discharged from the hospital on March 15, 2019.  He went to dialysis straight from the hospital upon discharge and tolerated well.  He has reports increased shortness of breath dizziness and generalized weakness after dialysis.  He presents to the emergency room with complaints of shortness of breath, dizziness and weakness.  He reports he has been taking his nebulizers at home without any improvement of his shortness of breath. The patient called his Pulmonologist's office this morning and was referred to the ER for further evaluation. He was admitted 1 week ago for PNA on 3/11 and discharged 3 days ago on 3/15 to follow-up with his Pulmonologist Dr. Mihir Guillen on 3/22.   Laboratory data and emergency room reviewed.  BNP has improved since previous admission.  He states he is to have dialysis this morning and came to the hospital instead of dialysis due to feeling ill.    PMH/PSH/SH/FH/Meds: reviewed.    Symptoms/Review of Systems:  Patiently waiting for transfer to [facility] for left heart catheterization.  Patient refused labs this morning. Blood glucose level remains high.  Gets easily breathless, reports no significant improvement in breathing. No chest pain or  headache, fever or abdominal pain.     Diet:  Adequate intake.    Activity level:  Up with assist  Pain:  Patient reports no pain.       OBJECTIVE:   Vital Signs (Most Recent):      Temp: 96 °F (35.6 °C) (03/22/19 0808)  Pulse: 75 (03/22/19 0808)  Resp: 20 (03/22/19 0808)  BP: (!) 143/97 (03/22/19 0808)  SpO2: 98 % (03/22/19 0808)       Vital Signs Range (Last 24H):  Temp:  [96 °F (35.6 °C)-96.7 °F (35.9 °C)]   Pulse:  [73-93]   Resp:  [17-20]   BP: (136-173)/(75-97)   SpO2:  [95 %-99 %]     Weight: 107.7 kg (237 lb 7 oz)  Body mass index is 31.33 kg/m².    Intake/Output Summary (Last 24 hours) at 3/22/2019 0845  Last data filed at 3/22/2019 0600  Gross per 24 hour   Intake 1000 ml   Output --   Net 1000 ml     Physical Examination:  General appearance: well developed, appears stated age  Head: normocephalic, atraumatic  Eyes:  conjunctivae/corneas clear. PERRL.  Nose: Nares normal. Septum midline.  Throat: lips, mucosa, and tongue normal; teeth and gums normal, no throat erythema.  Neck: supple, symmetrical, trachea midline, no JVD and thyroid not enlarged, symmetric, no tenderness/mass/nodules  Lungs:  Decreased air movement bilaterally, scattered rhonchi bilaterally.  Chest wall: no tenderness  Heart: regular rate and rhythm, S1, S2 normal, no murmur, click, rub or gallop  Abdomen: soft, non-tender non-distented; bowel sounds normal; no masses,  no organomegaly  Extremities: no cyanosis, clubbing or edema.   Pulses: 2+ and symmetric  Skin: Skin color, texture, turgor normal. No rashes or lesions.  Lymph nodes: Cervical, supraclavicular, and axillary nodes normal.  Neurologic: Normal strength and tone. No focal numbness or weakness. CNII-XII intact.      CBC:  Recent Labs   Lab 03/19/19  0428 03/20/19  0421 03/21/19  0456   WBC 7.00 8.90 9.00   RBC 3.44* 3.47* 3.41*   HGB 10.7* 10.8* 10.7*   HCT 32.5* 33.0* 32.3*   * 100* 96*   MCV 95 95 95   MCH 31.1* 31.2* 31.3*   MCHC 33.0 32.7 33.0   BMP  Recent Labs    Lab 03/19/19  0428 03/19/19  1126 03/20/19  0421 03/21/19  0456   * 495* 469* 258*     --  131* 135*   K 5.0  --  5.0 4.7     --  98 100   CO2 21*  --  20* 22*   BUN 78*  --  106* 92*   CREATININE 4.7*  --  5.4* 4.8*   CALCIUM 7.9*  --  7.7* 7.6*     Lab Results   Component Value Date    INR 2.7 (H) 03/21/2019    INR 4.2 (H) 03/20/2019    INR 3.3 (H) 03/19/2019     Diagnostic Results:  Microbiology Results (last 7 days)     ** No results found for the last 168 hours. **         Chest X-Ray: Mild pulmonary infiltrates favoring pneumonia. Cardiomegaly, atherosclerosis and prior sternotomy.    Assessment/Plan:     * Asthma exacerbation  Community-acquired pneumonia     Supplemental O2 via nasal canula; titrate O2 saturation to >92%.   On IV Solu-Medrol 40 mg daily.  Follow Pulmonary recommendation.  Continue beta 2 agonist bronchodilator treatments.   Continue PO antibiotics - azithromycin.  Check sputum GS and Cx.   Continue routine medications as before.       Steroid induced hyperglycemia  Diabetes mellitus type 2  Diabetic education nurse to see the patient for counseling today.  Increase Levemir 12 units subcu daily.  Check blood glucose level q AC/HS.  Use Novolog Insulin Sliding Scale as needed.   Continue American Diabetic Association 1800 Kcal diet.         End-stage renal     Continue Chronic hemodialysis. Monitor daily electrolytes and defer dialysis orders to nephrology.      Anemia of chronic disease due to end-stage renal disease     Chronic problem. Will continue chronic medications and monitor for any changes, adjusting as needed.      Benign prostatic hyperplasia without lower urinary tract symptoms     Chronic problem.  Continue Flomax and Proscar.      Gout, arthritis     Chronic. Continue Allopurinol.      CAD (coronary artery disease), 2V CABG 2007     History of CAD s/p CABG.  No s/s of angina.  Continue ASA, Statin, and BB.  Telemetry monitoring.  Monitor for s/s of ACS.   Follow Cardiology recommendations.       Hyperlipidemia, baseline      Chronic problem. Continue Statin therapy.                Lab Results   Component Value Date     LDLCALC 46.4 (L) 07/26/2018       Essential hypertension, uncontrolled     Chronic problem. Controlled. Continue home medications and monitor b/p closely, adjusting as necessary.  Increased Coreg 12.5 mg BID.      Disposition:  Awaiting transfer to [facility] for left heart catheterization when bed available. Discussed with CM.     VTE Risk Mitigation (From admission, onward)        Ordered     heparin (porcine) injection 5,000 Units  Every 12 hours      03/22/19 0847     IP VTE HIGH RISK PATIENT  Once      03/18/19 1330     Reason for no Mechanical VTE Prophylaxis  Once      03/18/19 1330        Charissa Olivas MD  Department of Hospital Medicine   [facility]

## 2019-03-22 NOTE — PROGRESS NOTES
Progress Note  PULMONARY    Admit Date: 3/18/2019   03/22/2019      SUBJECTIVE:     March 20, 2019-patient continues to have severe burning chest pain and severe dyspnea exertion.  March 21, no new c/o   March 22nd, declined morning labs, no new- depressed    PFSH and Allergies reviewed.    OBJECTIVE:     Vitals (Most recent):  Vitals:    03/22/19 0719   BP:    Pulse: 78   Resp: 20   Temp:        Vitals (24 hour range):  Temp:  [96 °F (35.6 °C)-96.7 °F (35.9 °C)]   Pulse:  [73-93]   Resp:  [16-20]   BP: (136-195)/(75-90)   SpO2:  [95 %-99 %]       Intake/Output Summary (Last 24 hours) at 3/22/2019 0733  Last data filed at 3/22/2019 0600  Gross per 24 hour   Intake 1000 ml   Output --   Net 1000 ml          Physical Exam:  The patient's neuro status (alertness,orientation,cognitive function,motor skills,), pharyngeal exam (oral lesions, hygiene, abn dentition,), Neck (jvd,mass,thyroid,nodes in neck and above/below clavicle),RESPIRATORY(symmetry,effort,fremitus,percussion,auscultation),  Cor(rhythm,heart tones including gallops,perfusion,edema)ABD(distention,hepatic&splenomegaly,tenderness,masses), Skin(rash,cyanosis),Psyc(affect,judgement,).  Exam negative except for these pertinent findings:    Alert,slight rhonchi but no  wheeze, symmetric chest, no distress, normal percussion, normal fremitus, no edema    Radiographs reviewed: view by direct vision -vascular congestion seen on chest x-ray at admit, no change  Results for orders placed during the hospital encounter of 03/11/19   X-Ray Chest 1 View    Narrative EXAMINATION:  XR CHEST 1 VIEW    CLINICAL HISTORY:  post HD;    TECHNIQUE:  Single frontal view of the chest was performed.    COMPARISON:  3/11/2019    FINDINGS:  The heart is enlarged.  The cardiomediastinal silhouette is stable.  There is no confluent infiltrate.  There is no pleural effusion.  There are median sternotomy sutures      Impression Cardiomegaly.  No acute cardiopulmonary  disease      Electronically signed by: Mayuri Castro MD  Date:    03/14/2019  Time:    10:37   ]    6 min walk study was done March 19, 2019.  Baseline room air saturation was 97%.  With 6 min of walking O2 sat varied from 94-98%.  There was no severe desaturation.  Patient walked 340 m or 69% of the reference distance.        Spirometry was on March 19, 2019. the FEV1 to FVC ratio was 83%.  There is no airflow obstruction.  The FEV1 measures 66% with a forced vital capacity 58%.  Patient may have air trapping or restrictive disease, clinical correlation recommended.     Spirometry is abnormal.  Labs     No results for input(s): WBC, HGB, HCT, PLT, BAND, METAMYELOCYT, MYELOPCT, HGBA1C in the last 24 hours.  No results for input(s): NA, K, CL, CO2, BUN, CREATININE, GLU, CALCIUM, CAION, MG, PHOS, AST, ALT, ALKPHOS, BILITOT, BILIDIR, PROT, ALBUMIN, PREALBUMIN, AMYLASE, LIPASE, CRP, HSCRP, SEDRATE, PROCAL, INR, PTT, LABHEPA, LACTATE, TROPONINI, CPK, CPKMB, MB, BNP in the last 24 hours.No results for input(s): PH, PCO2, PO2, HCO3 in the last 24 hours.  Microbiology Results (last 7 days)     ** No results found for the last 168 hours. **          Impression:  Active Hospital Problems    Diagnosis  POA    *Severe persistent asthma with acute exacerbation [J45.51]  Yes    SOB (shortness of breath) [R06.02]  Yes    Acute on chronic diastolic congestive heart failure [I50.33]  Yes    Type 2 diabetes mellitus, without long-term current use of insulin [E11.9]  Yes    Hypertension due to end stage renal disease caused by type 2 diabetes mellitus, on dialysis [E11.22, I12.0, Z99.2, N18.6]  Not Applicable    Pulmonary hypertension [I27.20]  Yes    Persistent atrial fibrillation [I48.1]  Yes    ESRD (end stage renal disease) [N18.6]  Yes    Immune deficiency disorder [D84.9]  Yes    Secondary hyperparathyroidism [N25.81]  Yes    Anemia due to chronic kidney disease, on chronic dialysis [N18.6, D63.1, Z99.2]  Not  Applicable    Vertigo [R42]  Yes      Resolved Hospital Problems   No resolved problems to display.               Plan:     March 20th-his baseline FEV1 was 71% of predicted last year and currently he measures 66%.  He maintains some faint expiratory wheezes in the posterior lung bases refractory to steroids and bronchodilators.  Case is discussed with Cardiology, and would be inclined to believe his heart may be the source of his problems particularly with his exertional burning chest pain etc his respiratory reserve should be more than adequate for any needed cardiac procedures including surgery.    Will decrease steroids from 160 a day to 40 a day    March 21, discussed with daughter.  Faint wheeze, today, stable    March 22nd, patient has had a depressed affect.  Respiratory wise he may be near optimal (?).  Needs cardiac evaluation.  He declined a.m. labs.  On Solu-Medrol 40 daily.                        .

## 2019-03-22 NOTE — PROGRESS NOTES
03/22/19 1545   Handoff Report   Given To Jennifer   Vital Signs   Temp 97.1 °F (36.2 °C)   Pulse 79   Resp 18   BP (!) 128/90   Post-Hemodialysis Assessment   Rinseback Volume (mL) 250 mL   Blood Volume Processed (Liters) 54 L   Dialyzer Clearance Lightly streaked   Duration of Treatment (minutes) 180 minutes   Hemodialysis Intake (mL) 500 mL   Total UF (mL) 2000 mL   Net Fluid Removal 1500   Patient Response to Treatment tolerated well   Post-Treatment Weight 106 kg (233 lb 11 oz)   Treatment Weight Change -1.7   Arterial bleeding stop time (min) 6 min   Venous bleeding stop time (min) 6 min   Post-Hemodialysis Comments needles pulled without problem, no bleeding

## 2019-03-22 NOTE — PROGRESS NOTES
Progress Note  Nephrology    Consult Requested By: Charissa Olivas MD    Reason for Consult: ESRD, dependent on dialysis    SUBJECTIVE:     History of Present Illness:  77 y/o male patient presented to ER today with c/o chest pain, SOB, dizziness.  He has out patient HD in [place] on MWF, and denies missing any treatments.  States he can only tolerate about 2L pulled without cramping.  Renal is consulted for dialysis orders.    3/18  CXR shows pulmonary vascular distention.  Pt was seen in dialysis today, has only had about 300cc UF so far, so still symptomatic.  Has mild pedal edema, .    3.19  Still coughing and dyspnea with exertion.  No chest pain  3/20  Still with some sob.  Pt states he's going to main [employer].   3/21  No chest pain or nausea.  Feels alright this am  3/22 depressed.  More sob       Asessment/plan:    1.  ESRD--   MWF hd with UF.   UF goal 3L.   2.  Fluid volume overload--UF as above  3.  Anemia of chronic dz--Hgb >10, no need for epo at present time  4.  Mild hyponatremia--will correct with removal of fluid  5.  SHPT--no binders on med list, Ca+ low--but no albumin level.  Will add on CMP and phos.    Past Medical History:   Diagnosis Date    NICK (acute kidney injury) 7/29/2016    Allergy     Anemia, mild 12/15/2014    Arthritis     Gout    Benign essential HTN 3/27/2012    BMI 29.0-29.9,adult 5/10/2018    BPH (benign prostatic hyperplasia)     BPH (benign prostatic hyperplasia)     CAD (coronary artery disease) 2006    Chronic kidney disease     due to ibuprofen    Colon polyp     CRF (chronic renal failure), stage 5     Diverticulosis     Gastritis     GERD (gastroesophageal reflux disease)     Gout     History of colon polyps 5/3/2018    HTN (hypertension) 3/27/2012    Hyperlipidemia     Hyperlipidemia     LLL pneumonia 6/14/2018    LVH (left ventricular hypertrophy)     Mesenteric ischemia     Murmur, cardiac 3/27/2012    GRICELDA (obstructive sleep apnea)     DOES  NOT USE A MACHINE    Sinus problem     Syncope and collapse      Past Surgical History:   Procedure Laterality Date    APPENDECTOMY      BLOCK-NERVE Left 5/31/2016    [facility]    CARDIAC CATHETERIZATION      COLONOSCOPY  2011    COLONOSCOPY N/A 5/3/2018    [facility]    COLONOSCOPY N/A 9/10/2015    [facility] at [facility]    CORONARY ARTERY BYPASS GRAFT  4/2007    x 1    CYSTOSCOPY N/A 8/30/2017    [facility] at [facility]    CYSTOSCOPY N/A 11/10/2015    [facility]    ESOPHAGOGASTRODUODENOSCOPY (EGD) N/A 1/4/2016    [facility] ([facility]    ESOPHAGOGASTRODUODENOSCOPY (EGD) N/A 10/15/2014    [facility] by [facility] at [facility]    INJECTION-STEROID-EPIDURAL-TRANSFORAMINAL Left 2/25/2016    [facility] at [facility] OR    JOINT REPLACEMENT      left knee total replacement  X 3    mid leftt finger      from a cactuss    MOLE REMOVAL  2016    RADIOFREQUENCY THERMOCOAGULATION (RFTC)-NERVE-MEDIAN BRANCH-LUMBAR Left 4/11/2016    [facility] at [facility] OR    rotative cuff      no rotative cuffs on bilat shoulders has pins     SHOULDER SURGERY      shoulder surgery bilat  RIGHT X 4; LEFT X 3    TRANSRECTAL ULTRASOUND GUIDED PROSTATE BIOPSY Bilateral 11/10/2015    [facility] at [facility] OR    ULTRASOUND-ENDOSCOPIC-UPPER N/A 5/31/2017    [facility] at [facility] (2ND FLR)    ULTRASOUND-ENDOSCOPIC-UPPER N/A 1/4/2016    Performed by Tra [facility] at [facility] END[facility] (2ND FLR)    ULTRASOUND-ENDOSCOPIC-UPPER N/A 1/16/2015    Performed by Jason Saleem MD at [facility] ENDO (2ND FLR)     Family History   Problem Relation Age of Onset    Heart disease Mother     Sudden death Father     Cancer Father         advanced lung ca- found after 2 story fall    Stroke Sister      Pneumonia Sister          from PNA    Heart disease Sister     Hypertension Brother     Kidney cancer Neg Hx     Prostate cancer Neg Hx     Urolithiasis Neg Hx     Allergic rhinitis Neg Hx     Allergies Neg Hx     Angioedema Neg Hx     Asthma Neg Hx     Atopy Neg Hx     Eczema Neg Hx     Immunodeficiency Neg Hx     Rhinitis Neg Hx     Urticaria Neg Hx      Social History     Tobacco Use    Smoking status: Never Smoker    Smokeless tobacco: Never Used   Substance Use Topics    Alcohol use: No    Drug use: No       Review of patient's allergies indicates:   Allergen Reactions    Ace inhibitors Other (See Comments)     Cough    Arb-angiotensin receptor antagonist Itching    Eplerenone Other (See Comments)     Marked bradycardia, 40, tiredness and weakness      Sulfa (sulfonamide antibiotics) Itching     Patient says this was 10 years ago and doesn't remember what happened             OBJECTIVE:     Vital Signs Range (Last 24H):  Temp:  [96 °F (35.6 °C)-96.7 °F (35.9 °C)]   Pulse:  [73-93]   Resp:  [17-20]   BP: (136-173)/(75-97)   SpO2:  [95 %-99 %]     Physical Exam:  General- Patient alert and oriented x3 in NAD  HEENT- WNL  Neck- supple  CV- Regular rate and rhythm,   Resp- faint exp wheezes.   GI- Non tender/non-distended  Extrem- mildly edemetous  Derm- w/d  Neuro-  No flap. No focal deficits noted.     Body mass index is 31.33 kg/m².    Laboratory:  CBC:   Recent Labs   Lab 19  0921   WBC 9.10   RBC 3.68*   HGB 11.5*   HCT 34.8*   *   MCV 95   MCH 31.1*   MCHC 33.0     CMP:   Recent Labs   Lab 19  1606  19  0921   *   < > 181*   CALCIUM 8.7   < > 7.4*   ALBUMIN 3.8  --   --    PROT 6.7  --   --       < > 133*   K 4.7   < > 4.4   CO2 28   < > 23   CL 99   < > 98   BUN 65*   < > 107*   CREATININE 4.0*   < > 5.6*   ALKPHOS 107  --   --    *  --   --    AST 39  --   --    BILITOT 0.7  --   --     < > = values in this interval not displayed.        Diagnostic Results:  Labs: Reviewed  X-Ray: Reviewed        ASSESSMENT/PLAN:     Active Hospital Problems    Diagnosis  POA    *Severe persistent asthma with acute exacerbation [J45.51]  Yes    SOB (shortness of breath) [R06.02]  Yes    Acute on chronic diastolic congestive heart failure [I50.33]  Yes    Type 2 diabetes mellitus, without long-term current use of insulin [E11.9]  Yes    Hypertension due to end stage renal disease caused by type 2 diabetes mellitus, on dialysis [E11.22, I12.0, Z99.2, N18.6]  Not Applicable    Pulmonary hypertension [I27.20]  Yes    Persistent atrial fibrillation [I48.1]  Yes    ESRD (end stage renal disease) [N18.6]  Yes    Immune deficiency disorder [D84.9]  Yes    Secondary hyperparathyroidism [N25.81]  Yes    Anemia due to chronic kidney disease, on chronic dialysis [N18.6, D63.1, Z99.2]  Not Applicable    Vertigo [R42]  Yes      Resolved Hospital Problems   No resolved problems to display.         Thank you for allowing us to participate in the care of your patient. We will follow the patient and provide recommendations as needed.      Time spent seeing patient( greater than 1/2 spent in direct contact) :     Frankie Rojo MD  Nephrology  [facility]  [phone]

## 2019-03-22 NOTE — PLAN OF CARE
Spoke with Ayana at [facility]; we are still awaiting a bed assignment....ANTONIO Brar CM

## 2019-03-23 PROBLEM — Z99.2 ESRD (END STAGE RENAL DISEASE) ON DIALYSIS: Status: ACTIVE | Noted: 2019-03-23

## 2019-03-23 PROBLEM — N18.6 ESRD (END STAGE RENAL DISEASE) ON DIALYSIS: Status: ACTIVE | Noted: 2019-03-23

## 2019-03-23 LAB
ALBUMIN SERPL BCP-MCNC: 2.9 G/DL
ANION GAP SERPL CALC-SCNC: 11 MMOL/L
APTT BLDCRRT: 25.1 SEC
ASCENDING AORTA: 4.28 CM
AV INDEX (PROSTH): 0.32
AV MEAN GRADIENT: 22.39 MMHG
AV PEAK GRADIENT: 30.91 MMHG
AV VALVE AREA: 1.37 CM2
AV VELOCITY RATIO: 0.31
BASOPHILS # BLD AUTO: 0.01 K/UL
BASOPHILS NFR BLD: 0.1 %
BSA FOR ECHO PROCEDURE: 2.34 M2
BUN SERPL-MCNC: 87 MG/DL
CALCIUM SERPL-MCNC: 7.1 MG/DL
CHLORIDE SERPL-SCNC: 101 MMOL/L
CO2 SERPL-SCNC: 22 MMOL/L
CREAT SERPL-MCNC: 4.4 MG/DL
CV ECHO LV RWT: 0.34 CM
DIFFERENTIAL METHOD: ABNORMAL
DOP CALC AO PEAK VEL: 2.78 M/S
DOP CALC AO VTI: 59.13 CM
DOP CALC LVOT AREA: 4.34 CM2
DOP CALC LVOT DIAMETER: 2.35 CM
DOP CALC LVOT PEAK VEL: 0.87 M/S
DOP CALC LVOT STROKE VOLUME: 80.85 CM3
DOP CALCLVOT PEAK VEL VTI: 18.65 CM
E WAVE DECELERATION TIME: 172.51 MSEC
E/A RATIO: 3
E/E' RATIO: 12.8
ECHO LV POSTERIOR WALL: 1.08 CM (ref 0.6–1.1)
EOSINOPHIL # BLD AUTO: 0 K/UL
EOSINOPHIL NFR BLD: 0 %
ERYTHROCYTE [DISTWIDTH] IN BLOOD BY AUTOMATED COUNT: 15.8 %
EST. GFR  (AFRICAN AMERICAN): 13.7 ML/MIN/1.73 M^2
EST. GFR  (NON AFRICAN AMERICAN): 11.9 ML/MIN/1.73 M^2
FRACTIONAL SHORTENING: 28 % (ref 28–44)
GLUCOSE SERPL-MCNC: 177 MG/DL
HCT VFR BLD AUTO: 32 %
HGB BLD-MCNC: 10.7 G/DL
IMM GRANULOCYTES # BLD AUTO: 0.09 K/UL
IMM GRANULOCYTES NFR BLD AUTO: 1.3 %
INR PPP: 1.5
INTERVENTRICULAR SEPTUM: 1.07 CM (ref 0.6–1.1)
IVRT: 0.05 MSEC
LA MAJOR: 7 CM
LA MINOR: 7 CM
LA WIDTH: 5 CM
LEFT ATRIUM SIZE: 5 CM
LEFT ATRIUM VOLUME INDEX: 64.5 ML/M2
LEFT ATRIUM VOLUME: 148.75 CM3
LEFT INTERNAL DIMENSION IN SYSTOLE: 4.62 CM (ref 2.1–4)
LEFT VENTRICLE DIASTOLIC VOLUME INDEX: 89.97 ML/M2
LEFT VENTRICLE DIASTOLIC VOLUME: 207.33 ML
LEFT VENTRICLE MASS INDEX: 130.6 G/M2
LEFT VENTRICLE SYSTOLIC VOLUME INDEX: 42.7 ML/M2
LEFT VENTRICLE SYSTOLIC VOLUME: 98.48 ML
LEFT VENTRICULAR INTERNAL DIMENSION IN DIASTOLE: 6.38 CM (ref 3.5–6)
LEFT VENTRICULAR MASS: 300.92 G
LV LATERAL E/E' RATIO: 9.6
LV SEPTAL E/E' RATIO: 19.2
LYMPHOCYTES # BLD AUTO: 0.7 K/UL
LYMPHOCYTES NFR BLD: 10 %
MCH RBC QN AUTO: 31.6 PG
MCHC RBC AUTO-ENTMCNC: 33.4 G/DL
MCV RBC AUTO: 94 FL
MONOCYTES # BLD AUTO: 0.5 K/UL
MONOCYTES NFR BLD: 6.9 %
MV PEAK A VEL: 0.32 M/S
MV PEAK E VEL: 0.96 M/S
NEUTROPHILS # BLD AUTO: 5.8 K/UL
NEUTROPHILS NFR BLD: 81.7 %
NRBC BLD-RTO: 0 /100 WBC
PHOSPHATE SERPL-MCNC: 5.9 MG/DL
PISA TR MAX VEL: 3.1 M/S
PLATELET # BLD AUTO: 92 K/UL
PMV BLD AUTO: 10.8 FL
POCT GLUCOSE: 181 MG/DL (ref 70–110)
POCT GLUCOSE: 186 MG/DL (ref 70–110)
POCT GLUCOSE: 295 MG/DL (ref 70–110)
POCT GLUCOSE: 296 MG/DL (ref 70–110)
POTASSIUM SERPL-SCNC: 4.4 MMOL/L
PROTHROMBIN TIME: 14.8 SEC
RA MAJOR: 7.24 CM
RA PRESSURE: 3 MMHG
RA WIDTH: 4.91 CM
RBC # BLD AUTO: 3.39 M/UL
RIGHT VENTRICULAR END-DIASTOLIC DIMENSION: 4.49 CM
RV TISSUE DOPPLER FREE WALL SYSTOLIC VELOCITY 1 (APICAL 4 CHAMBER VIEW): 8.15 M/S
SINUS: 3.58 CM
SODIUM SERPL-SCNC: 134 MMOL/L
STJ: 3.13 CM
TDI LATERAL: 0.1
TDI SEPTAL: 0.05
TDI: 0.08
TR MAX PG: 38.44 MMHG
TRICUSPID ANNULAR PLANE SYSTOLIC EXCURSION: 1.03 CM
TROPONIN I SERPL DL<=0.01 NG/ML-MCNC: 0.07 NG/ML
TROPONIN I SERPL DL<=0.01 NG/ML-MCNC: 0.08 NG/ML
TV REST PULMONARY ARTERY PRESSURE: 41 MMHG
WBC # BLD AUTO: 7.09 K/UL

## 2019-03-23 PROCEDURE — 85025 COMPLETE CBC W/AUTO DIFF WBC: CPT

## 2019-03-23 PROCEDURE — 99223 1ST HOSP IP/OBS HIGH 75: CPT | Mod: ,,, | Performed by: NURSE PRACTITIONER

## 2019-03-23 PROCEDURE — 63600175 PHARM REV CODE 636 W HCPCS: Performed by: HOSPITALIST

## 2019-03-23 PROCEDURE — S5571 INSULIN DISPOS PEN 3 ML: HCPCS | Performed by: INTERNAL MEDICINE

## 2019-03-23 PROCEDURE — 94640 AIRWAY INHALATION TREATMENT: CPT

## 2019-03-23 PROCEDURE — 99223 PR INITIAL HOSPITAL CARE,LEVL III: ICD-10-PCS | Mod: ,,, | Performed by: NURSE PRACTITIONER

## 2019-03-23 PROCEDURE — 85730 THROMBOPLASTIN TIME PARTIAL: CPT

## 2019-03-23 PROCEDURE — 85610 PROTHROMBIN TIME: CPT

## 2019-03-23 PROCEDURE — 20600001 HC STEP DOWN PRIVATE ROOM

## 2019-03-23 PROCEDURE — 99233 PR SUBSEQUENT HOSPITAL CARE,LEVL III: ICD-10-PCS | Mod: ,,, | Performed by: HOSPITALIST

## 2019-03-23 PROCEDURE — 63600175 PHARM REV CODE 636 W HCPCS: Performed by: INTERNAL MEDICINE

## 2019-03-23 PROCEDURE — 27000221 HC OXYGEN, UP TO 24 HOURS

## 2019-03-23 PROCEDURE — 94761 N-INVAS EAR/PLS OXIMETRY MLT: CPT

## 2019-03-23 PROCEDURE — 36415 COLL VENOUS BLD VENIPUNCTURE: CPT

## 2019-03-23 PROCEDURE — 25000003 PHARM REV CODE 250: Performed by: INTERNAL MEDICINE

## 2019-03-23 PROCEDURE — 97161 PT EVAL LOW COMPLEX 20 MIN: CPT

## 2019-03-23 PROCEDURE — 25000003 PHARM REV CODE 250: Performed by: HOSPITALIST

## 2019-03-23 PROCEDURE — 99233 SBSQ HOSP IP/OBS HIGH 50: CPT | Mod: ,,, | Performed by: HOSPITALIST

## 2019-03-23 PROCEDURE — 84100 ASSAY OF PHOSPHORUS: CPT

## 2019-03-23 PROCEDURE — 80048 BASIC METABOLIC PNL TOTAL CA: CPT

## 2019-03-23 PROCEDURE — 84484 ASSAY OF TROPONIN QUANT: CPT | Mod: 91

## 2019-03-23 PROCEDURE — 82040 ASSAY OF SERUM ALBUMIN: CPT

## 2019-03-23 PROCEDURE — 25000242 PHARM REV CODE 250 ALT 637 W/ HCPCS: Performed by: INTERNAL MEDICINE

## 2019-03-23 RX ORDER — FUROSEMIDE 10 MG/ML
120 INJECTION INTRAMUSCULAR; INTRAVENOUS 2 TIMES DAILY
Status: DISCONTINUED | OUTPATIENT
Start: 2019-03-23 | End: 2019-03-23

## 2019-03-23 RX ORDER — HEPARIN SODIUM,PORCINE/D5W 25000/250
12 INTRAVENOUS SOLUTION INTRAVENOUS CONTINUOUS
Status: DISCONTINUED | OUTPATIENT
Start: 2019-03-23 | End: 2019-03-24

## 2019-03-23 RX ORDER — FUROSEMIDE 10 MG/ML
100 INJECTION INTRAMUSCULAR; INTRAVENOUS ONCE
Status: COMPLETED | OUTPATIENT
Start: 2019-03-23 | End: 2019-03-23

## 2019-03-23 RX ORDER — AZITHROMYCIN 250 MG/1
250 TABLET, FILM COATED ORAL DAILY
Status: DISCONTINUED | OUTPATIENT
Start: 2019-03-23 | End: 2019-03-25

## 2019-03-23 RX ORDER — TORSEMIDE 10 MG/1
20 TABLET ORAL 2 TIMES DAILY
Status: DISCONTINUED | OUTPATIENT
Start: 2019-03-23 | End: 2019-04-09 | Stop reason: HOSPADM

## 2019-03-23 RX ADMIN — ATORVASTATIN CALCIUM 80 MG: 20 TABLET, FILM COATED ORAL at 09:03

## 2019-03-23 RX ADMIN — PREDNISONE 40 MG: 20 TABLET ORAL at 09:03

## 2019-03-23 RX ADMIN — ALLOPURINOL 100 MG: 100 TABLET ORAL at 09:03

## 2019-03-23 RX ADMIN — SERTRALINE HYDROCHLORIDE 25 MG: 25 TABLET ORAL at 09:03

## 2019-03-23 RX ADMIN — INSULIN DETEMIR 12 UNITS: 100 INJECTION, SOLUTION SUBCUTANEOUS at 09:03

## 2019-03-23 RX ADMIN — TORSEMIDE 20 MG: 20 TABLET ORAL at 09:03

## 2019-03-23 RX ADMIN — INSULIN ASPART 2 UNITS: 100 INJECTION, SOLUTION INTRAVENOUS; SUBCUTANEOUS at 11:03

## 2019-03-23 RX ADMIN — LOSARTAN POTASSIUM 100 MG: 50 TABLET, FILM COATED ORAL at 09:03

## 2019-03-23 RX ADMIN — AZITHROMYCIN 250 MG: 250 TABLET, FILM COATED ORAL at 09:03

## 2019-03-23 RX ADMIN — FUROSEMIDE 100 MG: 10 INJECTION, SOLUTION INTRAMUSCULAR; INTRAVENOUS at 11:03

## 2019-03-23 RX ADMIN — IPRATROPIUM BROMIDE AND ALBUTEROL SULFATE 3 ML: .5; 3 SOLUTION RESPIRATORY (INHALATION) at 07:03

## 2019-03-23 RX ADMIN — MIRTAZAPINE 7.5 MG: 7.5 TABLET ORAL at 09:03

## 2019-03-23 RX ADMIN — IPRATROPIUM BROMIDE AND ALBUTEROL SULFATE 3 ML: .5; 3 SOLUTION RESPIRATORY (INHALATION) at 03:03

## 2019-03-23 RX ADMIN — CARVEDILOL 25 MG: 25 TABLET, FILM COATED ORAL at 05:03

## 2019-03-23 RX ADMIN — INSULIN ASPART 6 UNITS: 100 INJECTION, SOLUTION INTRAVENOUS; SUBCUTANEOUS at 05:03

## 2019-03-23 RX ADMIN — PANTOPRAZOLE SODIUM 40 MG: 40 TABLET, DELAYED RELEASE ORAL at 09:03

## 2019-03-23 RX ADMIN — HEPARIN SODIUM AND DEXTROSE 12 UNITS/KG/HR: 10000; 5 INJECTION INTRAVENOUS at 03:03

## 2019-03-23 RX ADMIN — INSULIN ASPART 3 UNITS: 100 INJECTION, SOLUTION INTRAVENOUS; SUBCUTANEOUS at 09:03

## 2019-03-23 RX ADMIN — ASPIRIN 81 MG: 81 TABLET, COATED ORAL at 09:03

## 2019-03-23 RX ADMIN — HEPARIN SODIUM 5000 UNITS: 5000 INJECTION, SOLUTION INTRAVENOUS; SUBCUTANEOUS at 09:03

## 2019-03-23 RX ADMIN — TRAMADOL HYDROCHLORIDE 50 MG: 50 TABLET, FILM COATED ORAL at 11:03

## 2019-03-23 RX ADMIN — CARVEDILOL 25 MG: 25 TABLET, FILM COATED ORAL at 09:03

## 2019-03-23 RX ADMIN — GABAPENTIN 300 MG: 300 CAPSULE ORAL at 09:03

## 2019-03-23 RX ADMIN — IPRATROPIUM BROMIDE AND ALBUTEROL SULFATE 3 ML: .5; 3 SOLUTION RESPIRATORY (INHALATION) at 08:03

## 2019-03-23 RX ADMIN — TRAMADOL HYDROCHLORIDE 50 MG: 50 TABLET, FILM COATED ORAL at 02:03

## 2019-03-23 RX ADMIN — TAMSULOSIN HYDROCHLORIDE 0.4 MG: 0.4 CAPSULE ORAL at 09:03

## 2019-03-23 RX ADMIN — FINASTERIDE 5 MG: 5 TABLET, FILM COATED ORAL at 09:03

## 2019-03-23 RX ADMIN — FLUTICASONE PROPIONATE 100 MCG: 50 SPRAY, METERED NASAL at 09:03

## 2019-03-23 NOTE — ASSESSMENT & PLAN NOTE
-No need for emergent dialysis today  -pt medicated with furosemide 100 mg IV and torsemide 20 mg po today per MAR  -strict I/Os and chart  -daily standing weights and chart  -Obtain OP HD medical record  -maintain HD schedule while inpatient  -Hep B sAg negative 3/11/19   -Ph  -daily CBC, renal function panel  -Renal diet  -1 liter fluid restrictions per day  -Medications to renal parameter    Dialysis Information: [facility]  -Out patient HD unit: [facility]  -Nephrologist: Dr. Valderrama in [place]  -HD tx days: MWF  -HD tx time: 4 hours  -HD access: AVF   -Last HD: yesterday  -EDW: 106.0kg  -RRF: yes    Anemia  -Hb > 7 gm/dL    HTN  - Maintain MAP >65  - Keep target Bp <140/90    Ca 7.1  - Albumin  - Discussed with Hospital Medicine    Discussed with RN to obtain O2 sat

## 2019-03-23 NOTE — PLAN OF CARE
Problem: Physical Therapy Goal  Goal: Physical Therapy Goal  Pt met goals at eval to demonstrate safe level of mobility for d/c home.  Pt amb community distance with (S) only.  Outcome: Outcome(s) achieved Date Met: 03/23/19  PT Duane completed. Pt amb community level distance, 200', in hallway with (S).  He does not have any skilled needs for PT and will continue mobility program on the floor with nsg. Pt is in agreement and verbalized good understanding. D/C from PT.

## 2019-03-23 NOTE — PROGRESS NOTES
"[facility] Hospital Medicine  Progress Note    Primary Team: [facility] HOSP MED C  Admit Date: 3/22/2019    Subjective:        Interval History:  Transferred from OSH overnight for [facility]. Still feeling short of breath. Unable to walk far without SOB. Intermittent left sided chest pain. Wheezing.     ROS:  General: no fever, no chills, no weight loss, no fatigue  Cardiovascular: +chest pain, no orthopnea, +dyspnea  Respiratory: no cough, no wheezes, +SOB  All other systems reviewed & are negative.     Objective:   Last 24 Hour Vital Signs:  BP  Min: 113/70  Max: 145/79  Temp  Av.8 °F (36.6 °C)  Min: 97.1 °F (36.2 °C)  Max: 98.3 °F (36.8 °C)  Pulse  Av.5  Min: 64  Max: 87  Resp  Av  Min: 16  Max: 20  SpO2  Av.8 %  Min: 94 %  Max: 98 %  Height  Av' 1" (185.4 cm)  Min: 6' 1" (185.4 cm)  Max: 6' 1" (185.4 cm)  Weight  Av.2 kg (236 lb 6.2 oz)  Min: 106.6 kg (235 lb)  Max: 108 kg (238 lb 1.6 oz)  I/O last 3 completed shifts:  In: 120 [P.O.:120]  Out: 0     Physical Examination:  GEN: AAOx3, NAD  HEENT: NCAT, MMM, PERRL, EOMI, oropharynx clear  CV: irregularly irregular, no m/r, no S3/S4  RESP: +wheezes, no crackles, no increased WOB  ABD: soft, NTND, normoactive BS, no organomegaly  EXTR: no c/c/e, intact distal pulses x 4  NEURO: PERRL, EOMI, moving all four extremities, intact sensation to light touch, no focal deficits  SKIN: no rashes, lesions, or color changes  PSYCH: normal affect    Laboratory:  I have reviewed all pertinent lab results/findings within the past 24 hours.    Radiology:  I have reviewed all pertinent imaging results/findings within the past 24 hours.    Current Medications:     Infusions:   heparin (porcine) in D5W          Scheduled:   albuterol-ipratropium  3 mL Nebulization Q4H    allopurinol  100 mg Oral Daily    aspirin  81 mg Oral Daily    atorvastatin  80 mg Oral Daily    azithromycin  250 mg Oral Daily    carvedilol  25 mg Oral BID WM    " finasteride  5 mg Oral Daily    fluticasone  2 spray Each Nare Daily    furosemide  120 mg Intravenous BID    gabapentin  300 mg Oral QHS    insulin detemir U-100  12 Units Subcutaneous Daily    losartan  100 mg Oral QHS    mirtazapine  7.5 mg Oral QHS    pantoprazole  40 mg Oral Daily    predniSONE  40 mg Oral Daily    sertraline  25 mg Oral Daily    [START ON 3/29/2019] sertraline  50 mg Oral Daily    tamsulosin  1 capsule Oral Daily        PRN:  sodium chloride 0.9%, acetaminophen, albuterol-ipratropium, dextrose 50%, dextrose 50%, glucagon (human recombinant), glucose, glucose, heparin (PORCINE), heparin (PORCINE), insulin aspart U-100, magnesium oxide, magnesium oxide, meclizine, nitroGLYCERIN, ondansetron, potassium chloride 10%, potassium chloride 10%, senna, traMADol      Assessment/Plan:     Micheal Hunt is a 78 y.o.male with    Unstable angina  CAD s/p CABG '07  - monitor for signs of ACS  - tele monitoring  - troponin 0.8, flat  - continue aspirin, statin  - ACS protocol was not recommended by cardiology  - consult Interventional Cardiology in for [facility]  - TTE  - nitro p.r.n.     Chronic diastolic HF  - cont home torsemide  -TTE 3/23: EF 55%, moderate AS, PAP 41, severe VICENTE, patent foramen ovale, pHTN, normal CVP    Acute asthma exacerbation  - continue prednisone, azithromycin  - nebs q4  - satting well on room air     ESRD on HD MWF  - consult Nephrology for HD - needs volume removal  - renally adjust all meds     Paroxysmal Afib  - rate controlled  - patient refusing coumadin  - chadsvasc 6  - will start heparin gtt until [facility]  - tele monitoring  - continue beta-blocker    HTN  - chronic and stable  - continue home meds     DM2   - bedside glucose monitoring  - ISS  - continue Levemir 12 units daily  - diabetic diet     BPH  - chronic and stable  - continue Flomax and Proscar    Gout  - continue allopurinol  - chronic and stable     Newly diagnosed depression  - started on sertraline  at outside hospital, continue             PPX:   VTE Risk Mitigation (From admission, onward)        Ordered     heparin 25,000 units in dextrose 5% 250 mL (100 units/mL) infusion LOW INTENSITY nomogram - [facility]  Continuous      03/23/19 1335     heparin 25,000 units in dextrose 5% (100 units/ml) IV bolus from bag - ADDITIONAL PRN BOLUS - 60 units/kg  As needed (PRN)      03/23/19 1335     heparin 25,000 units in dextrose 5% (100 units/ml) IV bolus from bag - ADDITIONAL PRN BOLUS - 30 units/kg  As needed (PRN)      03/23/19 1335     IP VTE HIGH RISK PATIENT  Once      03/22/19 2207        Diet: renal, cardiac, fluid restricted  Dispo: pending [facility]  Discharge Needs: PTOT jonathon Grimes MD  [facility] Medicine Staff  [facility]

## 2019-03-23 NOTE — PLAN OF CARE
Problem: Adult Inpatient Plan of Care  Goal: Plan of Care Review  Patient remians free of falls, injuries and trauma. VSS. Patient denies SOB and dizziness. C/o of discomfort in left chest region. Cornelio MITCHELL aware. CBG ac.hs maintained. SSI given. AV Fistula left upper arm WNL.   Patient receiving Lasix IVP 120mg to diuresis. Possible LHC. Plan of care reviewed w/ patient. Patient verbalzied understanding/ Will continue to monitor.

## 2019-03-23 NOTE — H&P
Hospital Medicine  History and Physical Exam    Team: [facility] HOSP MED C Jeana Stark MD  Admit Date: 3/22/2019   Principal Problem:  Unstable angina   Patient information was obtained from patient and ER records.   Primary care Physician: Pk Lakhani MD  Code status: Full Code    Per transfer note:  Patient of concern is a 78M ESRD on iHD, essential HTN, mixed HLD, CAD s/p CABG in 2007, pAF, mesenteric ischemia initially admitted with worsening SOB and intermittent CP. Recently discharged after treatment for COPD with exacerbation. There is concern for unstable angina, cardiac etiology of symptoms. No cardiac markers were drawn so advised to trend. ACS protocol initiation has not been recommended by cardiology. The case was referred to Dr. Philip Higginbotham prior to my involvement who accepted in transfer for [facility].      HPI:   Micheal Hunt is a 78 y.o. male with hx of ESRD on HD, hypertension, hyperlipidemia, CAD s/p CABG in 2007, paroxysmal AFib, chronic diastolic heart failure, DM 2, asthma not on home oxygen, BPH who presents as a transfer from [facility] for evaluation for possible unstable angina.  Patient initially went into the hospital with worsening shortness of breath and what he describes as burning discomfort in his chest.  He denies any pressure on his chest.  He does have a history of CABG in 2007 but has not had a catheterization since.  Patient notes fatigue as well as some belching associated with this.  At the outside hospital he was also being treated for an acute asthma exacerbation is and was being followed by pulmonology.  Patient notes chronic cough is that is dry.  He denies any recent fevers or chills.  He denies any history of smoking.  He denies any family history of CAD or heart disease.  He notes he does live an active lifestyle and works [place] his [place] daily.    Patient evaluated by Cardiology at outside hospital and recommend transfer to [facility] for evaluation for  possible left heart catheterization.    Hemoglobin A1C   Date Value Ref Range Status   03/21/2019 8.3 (H) 4.0 - 5.6 % Final     Comment:     ADA Screening Guidelines:  5.7-6.4%  Consistent with prediabetes  >or=6.5%  Consistent with diabetes  High levels of fetal hemoglobin interfere with the HbA1C  assay. Heterozygous hemoglobin variants (HbS, HgC, etc)do  not significantly interfere with this assay.   However, presence of multiple variants may affect accuracy.     03/11/2019 7.5 (H) 4.0 - 5.6 % Final     Comment:     ADA Screening Guidelines:  5.7-6.4%  Consistent with prediabetes  >or=6.5%  Consistent with diabetes  High levels of fetal hemoglobin interfere with the HbA1C  assay. Heterozygous hemoglobin variants (HbS, HgC, etc)do  not significantly interfere with this assay.   However, presence of multiple variants may affect accuracy.     02/13/2015 6.2 4.5 - 6.2 % Final       Past Medical History: Patient has a past medical history of NICK (acute kidney injury) (7/29/2016), Allergy, Anemia, mild (12/15/2014), Arthritis, Benign essential HTN (3/27/2012), BMI 29.0-29.9,adult (5/10/2018), BPH (benign prostatic hyperplasia), BPH (benign prostatic hyperplasia), CAD (coronary artery disease) (2006), Chronic kidney disease, Colon polyp, CRF (chronic renal failure), stage 5, Diverticulosis, Gastritis, GERD (gastroesophageal reflux disease), Gout, History of colon polyps (5/3/2018), HTN (hypertension) (3/27/2012), Hyperlipidemia, Hyperlipidemia, LLL pneumonia (6/14/2018), LVH (left ventricular hypertrophy), Mesenteric ischemia, Murmur, cardiac (3/27/2012), GRICELDA (obstructive sleep apnea), Sinus problem, and Syncope and collapse.    Past Surgical History: Patient has a past surgical history that includes Shoulder surgery; rotative cuff; mid leftt finger; Appendectomy; Cardiac catheterization; Coronary artery bypass graft (4/2007); Colonoscopy (2011); Joint replacement; Mole removal (2016); and Colonoscopy (N/A,  5/3/2018).    Social History: Patient reports that  has never smoked. he has never used smokeless tobacco. He reports that he does not drink alcohol or use drugs.    Family History: family history includes Cancer in his father; Heart disease in his mother and sister; Hypertension in his brother; Pneumonia in his sister; Stroke in his sister; Sudden death in his father.    Medications: reviewed     Allergies: Patient is allergic to ace inhibitors; arb-angiotensin receptor antagonist; eplerenone; and sulfa (sulfonamide antibiotics).    ROS  Constitutional: Negative for chills, fever. + fatigue  HENT: Negative for sore throat, trouble swallowing.    Eyes: Negative for photophobia, visual disturbance.   Respiratory: +Negative for cough, shortness of breath.    Cardiovascular: Negative for palpitations, leg swelling. +chest pain (burning)  Gastrointestinal: Negative for abdominal pain, constipation, diarrhea, vomiting. +nausea  Endocrine: Negative for cold intolerance, heat intolerance.   Genitourinary: Negative for dysuria, frequency.   Musculoskeletal: Negative for arthralgias, myalgias.   Skin: Negative for rash, wound, erythema   Neurological: Negative for light-headedness , syncope, +weakness, dizziness  Psychiatric/Behavioral: Negative for confusion, hallucinations, anxiety, +depressed mood  All other systems reviewed and are negative.    PEx  Temp:  [96 °F (35.6 °C)-98.1 °F (36.7 °C)]   Pulse:  [58-85]   Resp:  [16-20]   BP: (110-152)/(67-97)   SpO2:  [95 %-99 %]   Body mass index is 31.41 kg/m².   No intake or output data in the 24 hours ending 03/22/19 6078      General appearance: no distress, alert and oriented x 3  HEENT:  conjunctivae/corneas clear, PERRL, mucous membranes moist   Neck: supple, thyroid not enlarged  Pulm:   normal respiratory effort, decreased breath sounds throughout lung fields with wheezes heard, no rales or rhonchi  Card:  Irregularly irregular rhythm, S1, S2 normal, + DEANA grade 2/6, no  click, rub or gallop  Abd: soft, NT, ND, BS present; no masses, no organomegaly  Ext: +AVF in LUE, +thrill, +bruit, mild pedal edema bilaterally  Pulses: 2+, symmetric  Skin: color, texture, turgor normal. No rashes or lesions  Neuro: CN II-XII grossly intact, no focal numbness or weakness, normal strength and tone   Psych:  Flat affect    Recent Results (from the past 24 hour(s))   POCT glucose    Collection Time: 03/22/19  5:57 AM   Result Value Ref Range    POCT Glucose 174 (H) 70 - 110 mg/dL   Protime-INR    Collection Time: 03/22/19  9:21 AM   Result Value Ref Range    Prothrombin Time 18.7 (H) 9.0 - 12.5 sec    INR 1.8 (H) 0.8 - 1.2   CBC auto differential    Collection Time: 03/22/19  9:21 AM   Result Value Ref Range    WBC 9.10 3.90 - 12.70 K/uL    RBC 3.68 (L) 4.60 - 6.20 M/uL    Hemoglobin 11.5 (L) 14.0 - 18.0 g/dL    Hematocrit 34.8 (L) 40.0 - 54.0 %    MCV 95 82 - 98 fL    MCH 31.1 (H) 27.0 - 31.0 pg    MCHC 33.0 32.0 - 36.0 g/dL    RDW 17.7 (H) 11.5 - 14.5 %    Platelets 105 (L) 150 - 350 K/uL    MPV 8.4 (L) 9.2 - 12.9 fL    Gran # (ANC) 7.4 1.8 - 7.7 K/uL    Lymph # 1.2 1.0 - 4.8 K/uL    Mono # 0.5 0.3 - 1.0 K/uL    Eos # 0.0 0.0 - 0.5 K/uL    Baso # 0.00 0.00 - 0.20 K/uL    Gran% 81.4 (H) 38.0 - 73.0 %    Lymph% 12.6 (L) 18.0 - 48.0 %    Mono% 5.7 4.0 - 15.0 %    Eosinophil% 0.1 0.0 - 8.0 %    Basophil% 0.2 0.0 - 1.9 %    Differential Method Automated    Basic metabolic panel    Collection Time: 03/22/19  9:21 AM   Result Value Ref Range    Sodium 133 (L) 136 - 145 mmol/L    Potassium 4.4 3.5 - 5.1 mmol/L    Chloride 98 95 - 110 mmol/L    CO2 23 23 - 29 mmol/L    Glucose 181 (H) 70 - 110 mg/dL    BUN, Bld 107 (H) 8 - 23 mg/dL    Creatinine 5.6 (H) 0.5 - 1.4 mg/dL    Calcium 7.4 (L) 8.7 - 10.5 mg/dL    Anion Gap 12 8 - 16 mmol/L    eGFR if African American 10 (A) >60 mL/min/1.73 m^2    eGFR if non African American 9 (A) >60 mL/min/1.73 m^2   Brain natriuretic peptide    Collection Time: 03/22/19  9:22  AM   Result Value Ref Range     (H) 0 - 99 pg/mL   POCT glucose    Collection Time: 03/22/19 11:11 AM   Result Value Ref Range    POCT Glucose 300 (H) 70 - 110 mg/dL   POCT glucose    Collection Time: 03/22/19  4:32 PM   Result Value Ref Range    POCT Glucose 243 (H) 70 - 110 mg/dL   POCT glucose    Collection Time: 03/22/19  9:04 PM   Result Value Ref Range    POCT Glucose 393 (H) 70 - 110 mg/dL       Recent Labs   Lab 03/21/19  1853 03/21/19  2045 03/22/19  0557 03/22/19  1111 03/22/19  1632 03/22/19  2104   POCTGLUCOSE 474* 276* 174* 300* 243* 393*       Active Hospital Problems    Diagnosis  POA    *Unstable angina [I20.0]  Yes      Resolved Hospital Problems   No resolved problems to display.       IMAGING:   Echo  · Mild left ventricular enlargement. Normal left ventricular systolic function. The estimated ejection fraction is 55%  · Atrial fibrillation observed with restrictive filling pattern present.  · Severe right ventricular enlargement. Normal right ventricular systolic function.  · Severe left atrial enlargement.  · Severe right atrial enlargement.  · Mild aortic valve stenosis (although calculated PEARL within normal range; mean gradient 17mmHg, peak velocity 2.9m/s). Mild aortic regurgitation.  · Mild mitral regurgitation.  · Mild tricuspid regurgitation.  · Pulmonary hypertension present. The estimated PA systolic pressure is 50 mm Hg  · The estimated PA systolic pressure is 50 mm Hg    CXR  FINDINGS:  The patient has had a prior sternotomy.  Calcification is noted in the aorta.  There is mild cardiomegaly.  There is prominence of the pulmonary paula however this is decreased from the prior study and pulmonary edema is not presently seen.  No pneumothorax is noted.      Impression       Continued mild cardiomegaly and prominence of the pulmonary vasculature without pulmonary edema today.  Prior sternotomy.  Atherosclerosis.       EKG:   Atrial fibrillation  LVH with QRS widening  ST and T wave  abnormality, consider lateral ischemia  Abnormal ECG  When compared with ECG of 11-MAR-2019 11:28,  No significant change was found  Confirmed by TIFFANY MONCADA MD ([id number]) on 3/21/2019 1:23:40 PM    Assessment and Plan:  1. Unstable angina  - monitor for signs of ACS  - tele monitoring  - trending troponins  - continue aspirin, statin  - consult Interventional Cardiology in a.m. for possible left heart cath  - repeat echo ordered per outside hospital Cardiology  - nitro p.r.n.    2. Acute asthma exacerbation  - continue prednisone, azithromycin  - consider pulm consult  - nebs every 4 hr  - continue Spiriva  - O2 as needed    3. HTN  - chronic and stable  - continue home meds    4. HLD  - chronic and stable  - continue statin    5. ESRD on HD MWF  - consult Nephrology, appreciate recs  - renally adjust all meds    6. CAP s/p CABG 2007  - continue aspirin, statin, beta-blocker  - tele monitoring  - patient currently denies any chest pain  - consult Cardiology for possible left heart catheterization    7. Paroxysmal Afib  - patient refusing Coumadin  - continue heparin started at OSH  - could consider starting patient on no active, will hold anticoagulation for possible catheterization  - tele monitoring  - continue beta-blocker    8. DM2   - bedside glucose monitoring  - ISS  - continue Levemir 12 units daily  - diabetic diet    9. Pulmonary HTN  - home was following at prior hospital, consider bone consult in a.m.  - O2 as needed    10.  BPH  - chronic and stable  - continue Flomax and Proscar    11.  Gout  - continue allopurinol  - chronic and stable    12. Chronic diastolic HF  - continue torsemide  - repeat echo ordered PTA    13.  Newly diagnosed depression  - started on sertraline at outside hospital, continue    14. Anemia of chronic disease  - chronic and stable  - CBC daily      Diet: diabetic/renal  DVT PPx: heparin  GI prophylaxis: PPI  Code status: FULL    Dispo:  Consult interventional cardiology for  possible left heart catheterization, consider pulm consult for persistent asthma exacerbation        Jeana Stark MD  Hospital Medicine Staff  [phone] pager

## 2019-03-23 NOTE — NURSING
"Patient started on heparin gtt. 30 minutes after start of infusion patient reports feeling "high, lightheaded". VSS at that time. Infusion stopped immediately  Cornelio MITCHELL paged. Okay stop infusion at this time. Patient reports feeling better since stopped. Will continue to monitor.   "

## 2019-03-23 NOTE — PLAN OF CARE
03/23/19 0934   Final Note   Assessment Type Final Discharge Note   Anticipated Discharge Disposition ANOTHER INST  ([facility]

## 2019-03-23 NOTE — SUBJECTIVE & OBJECTIVE
Past Medical History:   Diagnosis Date    NICK (acute kidney injury) 7/29/2016    Allergy     Anemia, mild 12/15/2014    Arthritis     Gout    Benign essential HTN 3/27/2012    BMI 29.0-29.9,adult 5/10/2018    BPH (benign prostatic hyperplasia)     BPH (benign prostatic hyperplasia)     CAD (coronary artery disease) 2006    Chronic kidney disease     due to ibuprofen    Colon polyp     CRF (chronic renal failure), stage 5     Diverticulosis     Gastritis     GERD (gastroesophageal reflux disease)     Gout     History of colon polyps 5/3/2018    HTN (hypertension) 3/27/2012    Hyperlipidemia     Hyperlipidemia     LLL pneumonia 6/14/2018    LVH (left ventricular hypertrophy)     Mesenteric ischemia     Murmur, cardiac 3/27/2012    GRICELDA (obstructive sleep apnea)     DOES NOT USE A MACHINE    Sinus problem     Syncope and collapse        Past Surgical History:   Procedure Laterality Date    APPENDECTOMY      BLOCK-NERVE Left 5/31/2016    [facility]    CARDIAC CATHETERIZATION      COLONOSCOPY  2011    COLONOSCOPY N/A 5/3/2018    [facility]O    COLONOSCOPY N/A 9/10/2015    [facility] at [facility]    CORONARY ARTERY BYPASS GRAFT  4/2007    x 1    CYSTOSCOPY N/A 8/30/2017    [facility] at [facility]    CYSTOSCOPY N/A 11/10/2015    [facility] OR    ESOPHAGOGASTRODUODENOSCOPY (EGD) N/A 1/4/2016    [facility] (2ND F[facility])    ESOPHAGOGASTRODUODENOSCOPY (EGD) N/A 10/15/2014    [facility] at [facility] ENDO    INJECTION-STEROID-EPIDURAL-TRANSFORAMINAL Left 2/25/2016    [facility]    JOINT REPLACEMENT      left knee total replacement  X 3    mid leftt finger      from a cactuss    MOLE REMOVAL  2016    RADIOFREQUENCY THERMOCOAGULATION (RFTC)-NERVE-MEDIAN BRANCH-LUMBAR Left  4/11/2016    Performed by [facility] at [facility] OR    rotative cuff      no rotative cuffs on bilat shoulders has pins     SHOULDER SURGERY      shoulder surgery bilat  RIGHT X 4; LEFT X 3    TRANSRECTAL ULTRASOUND GUIDED PROSTATE BIOPSY Bilateral 11/10/2015    [facility] OR    ULTRASOUND-ENDOSCOPIC-UPPER N/A 5/31/2017    [facility] at [facility] ENDO (2ND FLR)    ULTRASOUND-ENDOSCOPIC-UPPER N/A 1/4/2016    Performed by Tra [facility] at [facility] ENDO (2ND FLR)    ULTRASOUND-ENDOSCOPIC-UPPER N/A 1/16/2015    Performed by Jason Saleem MD at [facility] ENDO (2ND FLR)       Review of patient's allergies indicates:   Allergen Reactions    Ace inhibitors Other (See Comments)     Cough    Arb-angiotensin receptor antagonist Itching    Eplerenone Other (See Comments)     Marked bradycardia, 40, tiredness and weakness      Sulfa (sulfonamide antibiotics) Itching     Patient says this was 10 years ago and doesn't remember what happened     Current Facility-Administered Medications   Medication Frequency    0.9%  NaCl infusion PRN    acetaminophen tablet 650 mg Q6H PRN    albuterol-ipratropium 2.5 mg-0.5 mg/3 mL nebulizer solution 3 mL Q4H    albuterol-ipratropium 2.5 mg-0.5 mg/3 mL nebulizer solution 3 mL Q6H PRN    allopurinol tablet 100 mg Daily    aspirin EC tablet 81 mg Daily    atorvastatin tablet 80 mg Daily    azithromycin tablet 250 mg Daily    carvedilol tablet 25 mg BID WM    dextrose 50% injection 12.5 g PRN    dextrose 50% injection 25 g PRN    finasteride tablet 5 mg Daily    fluticasone 50 mcg/actuation nasal spray 100 mcg Daily    gabapentin capsule 300 mg QHS    glucagon (human recombinant) injection 1 mg PRN    glucose chewable tablet 16 g PRN    glucose chewable tablet 24 g PRN    heparin 25,000 units in dextrose 5% (100 units/ml) IV bolus from bag - ADDITIONAL PRN BOLUS - 30 units/kg PRN    heparin 25,000 units in dextrose 5% (100  "units/ml) IV bolus from bag - ADDITIONAL PRN BOLUS - 60 units/kg PRN    heparin 25,000 units in dextrose 5% 250 mL (100 units/mL) infusion LOW INTENSITY nomogram - [facility] Continuous    insulin aspart U-100 pen 1-10 Units QID (AC + HS) PRN    insulin detemir U-100 pen 12 Units Daily    losartan tablet 100 mg QHS    magnesium oxide tablet 800 mg PRN    magnesium oxide tablet 800 mg PRN    meclizine tablet 25 mg TID PRN    mirtazapine tablet 7.5 mg QHS    nitroGLYCERIN SL tablet 0.4 mg Q5 Min PRN    ondansetron tablet 8 mg Q6H PRN    pantoprazole EC tablet 40 mg Daily    potassium chloride 10% oral solution 40 mEq PRN    potassium chloride 10% oral solution 40 mEq PRN    predniSONE tablet 40 mg Daily    senna tablet 8.6 mg Daily PRN    sertraline tablet 25 mg Daily    [START ON 3/29/2019] sertraline tablet 50 mg Daily    tamsulosin 24 hr capsule 0.4 mg Daily    torsemide tablet 20 mg BID    traMADol tablet 50 mg Q8H PRN     Family History     Problem Relation (Age of Onset)    Cancer Father    Heart disease Mother, Sister    Hypertension Brother    Pneumonia Sister    Stroke Sister    Sudden death Father        Tobacco Use    Smoking status: Never Smoker    Smokeless tobacco: Never Used   Substance and Sexual Activity    Alcohol use: No    Drug use: No    Sexual activity: Not on file     Review of Systems   Constitutional: Negative.    HENT: Negative.    Eyes: Negative.    Respiratory: Positive for shortness of breath.         Pt states "can't breathe well after dialysis. Started havin burnin of the chest and need a valve replacement in my heart. Feel like I get cold all the time cause the fluid in my heart is leakin into the chamber". Pt reports had been getting dialysis "4 days in a row" at his OP HD unit and states that he has had to stop 2/2 cramping   Cardiovascular: Negative.    Gastrointestinal: Negative.    Endocrine: Negative.    Genitourinary: Negative.    Musculoskeletal: Positive for " "back pain.        Pt reports back pain "from layin' down"   Skin: Negative.    Allergic/Immunologic: Negative.    Neurological: Negative.    Hematological: Bruises/bleeds easily.   Psychiatric/Behavioral: Negative.      Objective:     Vital Signs (Most Recent):  Temp: 98.4 °F (36.9 °C) (03/23/19 1559)  Pulse: 67 (03/23/19 1559)  Resp: 16 (03/23/19 1559)  BP: (!) 145/88 (03/23/19 1645)  SpO2: 98 % (03/23/19 1559)  O2 Device (Oxygen Therapy): room air (03/23/19 1559) Vital Signs (24h Range):  Temp:  [97.5 °F (36.4 °C)-98.4 °F (36.9 °C)] 98.4 °F (36.9 °C)  Pulse:  [64-87] 67  Resp:  [16-18] 16  SpO2:  [94 %-98 %] 98 %  BP: (113-145)/(70-88) 145/88     Weight: 106.6 kg (235 lb) (03/23/19 0823)  Body mass index is 31 kg/m².  Body surface area is 2.34 meters squared.    I/O last 3 completed shifts:  In: 120 [P.O.:120]  Out: 0     Physical Exam   Constitutional: He is oriented to person, place, and time. He appears well-developed and well-nourished.   HENT:   Head: Normocephalic and atraumatic.   Eyeglasses to face   Eyes: Conjunctivae and EOM are normal. Pupils are equal, round, and reactive to light.   Neck: Normal range of motion. Neck supple.   Cardiovascular: Normal heart sounds and intact distal pulses.   Irregular rate and rhythm   Pulmonary/Chest: Effort normal. He has wheezes.   Shortness of breath; on RA   Abdominal: Soft. Bowel sounds are normal.   Musculoskeletal: Normal range of motion.   Neurological: He is alert and oriented to person, place, and time.   Skin: Skin is warm and dry. Capillary refill takes less than 2 seconds.   AVF to L upper arm with +thrill/+bruit. Ecchymosis to L arm. PIV to R lower arm   Psychiatric: He has a normal mood and affect. His behavior is normal. Judgment and thought content normal.   Nursing note and vitals reviewed.      Significant Labs:  All labs within the past 24 hours have been reviewed.    Significant Imaging:  Labs: Reviewed  ECG: Reviewed  X-Ray: Reviewed  Echo: " Reviewed

## 2019-03-23 NOTE — PT/OT/SLP EVAL
"Physical Therapy Evaluation and Discharge Note    Patient Name:  Micheal Hunt   MRN:  [id number]    Recommendations:     Discharge Recommendations:  home   Discharge Equipment Recommendations: none   Barriers to discharge: None and pt lives alone but his dtr and grandchildren live on the same property in a different house and can assist him if needed.    Assessment:     Micheal Hunt is a 78 y.o. male admitted with a medical diagnosis of Unstable angina. .  At this time, patient is functioning at their prior level of function and does not require further acute PT services. Pt was able to meet goal at [facility] to demonstrate safe level of mobility for d/c and he amb community level distance with (S).  Pt with good understanding of the risks of declining in functional mobility with hospitalization and agrees to amb with nsg in the hallway to prevent decline in function.     Recent Surgery: * No surgery found *      Plan:     During this hospitalization, patient does not require further acute PT services.  Please re-consult if situation changes.      Subjective     Chief Complaint: Pt with no c/o,   Patient/Family Comments/goals: " My grandkids are over every day,"  Pain/Comfort:  · Pain Rating 1: 0/10    Patients cultural, spiritual, Judaism conflicts given the current situation: no    Living Environment:  Pt lives alone in his one level home, 3 [address] with B rails and also ramp access.  His property is 40 acres and his dtr has a house on the property also.   Prior to admission, patients level of function was (I) with gait, ADL's and managing his property and animals.  He occasionally uses his SC when his L knee swells..  Equipment used at home: cane, straight(occasionally when his L knee swells).  DME owned (not currently used): none.  Upon discharge, patient will have assistance from dtr/grandkids if needed.    Objective:     Communicated with Magy magana,  prior to session.  Patient found supine with " telemetry, peripheral IV upon PT entry to room.    General Precautions: Standard,     Orthopedic Precautions:N/A   Braces: N/A     Exams:  · Cognitive Exam:  Patient is oriented to Person, Place and Situation  · Fine Motor Coordination:    · -       Intact  RLE heel shin and LLE heel shin  · Gross Motor Coordination:  WFL  · Postural Exam:  Patient presented with the following abnormalities:    · -       No postural abnormalities identified  · Sensation:    · -       Intact  · RLE ROM: WNL  · RLE Strength: WNL  · LLE ROM: WNL  · LLE Strength: WNL    Functional Mobility:  · Bed Mobility:     · Supine to Sit: independence  · Transfers:     · Sit to Stand:  independence with no AD  · Gait: 200' with no AD and (S) on level surfaces. Pt with mild decrease cristian, no LOB.  · Balance: sitting static/dynamic: Good  standing static/dynamic:  Good    AM-PAC 6 CLICK MOBILITY  Total Score:22       Therapeutic Activities and Exercises:   PT ed pt on PT POC and plan to d/c from PT and he verbalized good understanding. PT also ed pt on importance of mobility, risks of decline in function with immobility, safety awareness and he verbalized good understanding back to PT and agreement to amb with nsg. PT updated pt's nurseMagy on how pt did and plan to d/c PT and for pt to amb with nsg or family in the hallway.    White board updated in pt's room.       AM-PAC 6 CLICK MOBILITY  Total Score:22     Patient left setaed at EOB with all lines intact, call button in reach and nsgMagy notified.    GOALS:   Multidisciplinary Problems     Physical Therapy Goals     Not on file          Multidisciplinary Problems (Resolved)        Problem: Physical Therapy Goal    Goal Priority Disciplines Outcome Goal Variances Interventions   Physical Therapy Goal   (Resolved)     PT, PT/OT Outcome(s) achieved     Description:  Pt met goals at [facility] to demonstrate safe level of mobility for d/c home.  Pt amb community distance with (S) only.                     History:     Past Medical History:   Diagnosis Date    NICK (acute kidney injury) 7/29/2016    Allergy     Anemia, mild 12/15/2014    Arthritis     Gout    Benign essential HTN 3/27/2012    BMI 29.0-29.9,adult 5/10/2018    BPH (benign prostatic hyperplasia)     BPH (benign prostatic hyperplasia)     CAD (coronary artery disease) 2006    Chronic kidney disease     due to ibuprofen    Colon polyp     CRF (chronic renal failure), stage 5     Diverticulosis     Gastritis     GERD (gastroesophageal reflux disease)     Gout     History of colon polyps 5/3/2018    HTN (hypertension) 3/27/2012    Hyperlipidemia     Hyperlipidemia     LLL pneumonia 6/14/2018    LVH (left ventricular hypertrophy)     Mesenteric ischemia     Murmur, cardiac 3/27/2012    GRICELDA (obstructive sleep apnea)     DOES NOT USE A MACHINE    Sinus problem     Syncope and collapse        Past Surgical History:   Procedure Laterality Date    APPENDECTOMY      BLOCK-NERVE Left 5/31/2016    [facility]    CARDIAC CATHETERIZATION      COLONOSCOPY  2011    COLONOSCOPY N/A 5/3/2018    [facility] at [facility] ENDO    COLONOSCOPY N/A 9/10/2015    [facility]formed by [facility] at [facility] ENDO    CORONARY ARTERY BYPASS GRAFT  4/2007    x 1    CYSTOSCOPY N/A 8/30/2017    [facility] by [facility] at [facility] OR    CYSTOSCOPY N/A 11/10/2015    [facility] by [facility] at [facility] OR    ESOPHAGOGASTRODUODENOSCOPY (EGD) N/A 1/4/2016    [facility] at [facility] (2ND F[facility])    ESOPHAGOGASTRODUODENOSCOPY (EGD) N/A 10/15/2014    Performed by [facility] at [facility] ENDO    INJECTION-STEROID-EPIDURAL-TRANSFORAMINAL Left 2/25/2016    [facility] OR    JOINT REPLACEMENT      left knee total replacement  X 3    mid leftt finger      from a cactuss    MOLE REMOVAL  2016    RADIOFREQUENCY THERMOCOAGULATION (RFTC)-NERVE-MEDIAN  BRANCH-LUMBAR Left 4/11/2016    [facility] OR    rotative cuff      no rotative cuffs on bilat shoulders has pins     SHOULDER SURGERY      shoulder surgery bilat  RIGHT X 4; LEFT X 3    TRANSRECTAL ULTRASOUND GUIDED PROSTATE BIOPSY Bilateral 11/10/2015    [facility] OR    ULTRASOUND-ENDOSCOPIC-UPPER N/A 5/31/2017    [facility] at [facility] ENDO (2ND FLR)    ULTRASOUND-ENDOSCOPIC-UPPER N/A 1/4/2016    Performed by Tra [facility] at [facility] ENDO (2ND FLR)    ULTRASOUND-ENDOSCOPIC-UPPER N/A 1/16/2015    Performed by Jason Saleem MD at [facility] ENDO (2ND FLR)       Time Tracking:     PT Received On: 03/23/19  PT Start Time: 1036     PT Stop Time: 1049  PT Total Time (min): 13 min     Billable Minutes: Evaluation 13      Kallie Lang, PT  03/23/2019

## 2019-03-23 NOTE — HPI
77 y/o male with PMH ESRD on HD, HTN, HLD, CAD (s/p CABG 2007), paroxysmal AFib, chronic diastolic heart failure, asthma, T2DM, BPH, recent hospitalization 2/2 PNA (3/11/19)  transferred from [facility] for evaluation of unstable angina    History obtained from the patient and the medical chart

## 2019-03-24 PROBLEM — I48.0 PAROXYSMAL ATRIAL FIBRILLATION: Status: ACTIVE | Noted: 2019-03-24

## 2019-03-24 PROBLEM — I63.9 CVA (CEREBRAL VASCULAR ACCIDENT): Status: ACTIVE | Noted: 2019-03-24

## 2019-03-24 PROBLEM — I63.532 ACUTE ISCHEMIC LEFT PCA STROKE: Status: ACTIVE | Noted: 2019-03-24

## 2019-03-24 LAB
ALLENS TEST: ABNORMAL
ANION GAP SERPL CALC-SCNC: 11 MMOL/L (ref 8–16)
APTT BLDCRRT: 23.5 SEC (ref 21–32)
BASOPHILS # BLD AUTO: 0.01 K/UL (ref 0–0.2)
BASOPHILS # BLD AUTO: 0.01 K/UL (ref 0–0.2)
BASOPHILS NFR BLD: 0.1 % (ref 0–1.9)
BASOPHILS NFR BLD: 0.1 % (ref 0–1.9)
BUN SERPL-MCNC: 100 MG/DL (ref 8–23)
CALCIUM SERPL-MCNC: 6.8 MG/DL (ref 8.7–10.5)
CHLORIDE SERPL-SCNC: 101 MMOL/L (ref 95–110)
CO2 SERPL-SCNC: 22 MMOL/L (ref 23–29)
CREAT SERPL-MCNC: 5 MG/DL (ref 0.5–1.4)
DELSYS: ABNORMAL
DIFFERENTIAL METHOD: ABNORMAL
DIFFERENTIAL METHOD: ABNORMAL
EOSINOPHIL # BLD AUTO: 0 K/UL (ref 0–0.5)
EOSINOPHIL # BLD AUTO: 0 K/UL (ref 0–0.5)
EOSINOPHIL NFR BLD: 0 % (ref 0–8)
EOSINOPHIL NFR BLD: 0 % (ref 0–8)
ERYTHROCYTE [DISTWIDTH] IN BLOOD BY AUTOMATED COUNT: 15.5 % (ref 11.5–14.5)
ERYTHROCYTE [DISTWIDTH] IN BLOOD BY AUTOMATED COUNT: 15.5 % (ref 11.5–14.5)
EST. GFR  (AFRICAN AMERICAN): 11.8 ML/MIN/1.73 M^2
EST. GFR  (NON AFRICAN AMERICAN): 10.2 ML/MIN/1.73 M^2
GLUCOSE SERPL-MCNC: 181 MG/DL (ref 70–110)
HCO3 UR-SCNC: 19.5 MMOL/L (ref 24–28)
HCT VFR BLD AUTO: 31.4 % (ref 40–54)
HCT VFR BLD AUTO: 31.4 % (ref 40–54)
HGB BLD-MCNC: 10.7 G/DL (ref 14–18)
HGB BLD-MCNC: 10.7 G/DL (ref 14–18)
IMM GRANULOCYTES # BLD AUTO: 0.1 K/UL (ref 0–0.04)
IMM GRANULOCYTES # BLD AUTO: 0.1 K/UL (ref 0–0.04)
IMM GRANULOCYTES NFR BLD AUTO: 1.3 % (ref 0–0.5)
IMM GRANULOCYTES NFR BLD AUTO: 1.3 % (ref 0–0.5)
INR PPP: 1.2 (ref 0.8–1.2)
LYMPHOCYTES # BLD AUTO: 0.9 K/UL (ref 1–4.8)
LYMPHOCYTES # BLD AUTO: 0.9 K/UL (ref 1–4.8)
LYMPHOCYTES NFR BLD: 11.6 % (ref 18–48)
LYMPHOCYTES NFR BLD: 11.6 % (ref 18–48)
MCH RBC QN AUTO: 32.1 PG (ref 27–31)
MCH RBC QN AUTO: 32.1 PG (ref 27–31)
MCHC RBC AUTO-ENTMCNC: 34.1 G/DL (ref 32–36)
MCHC RBC AUTO-ENTMCNC: 34.1 G/DL (ref 32–36)
MCV RBC AUTO: 94 FL (ref 82–98)
MCV RBC AUTO: 94 FL (ref 82–98)
MONOCYTES # BLD AUTO: 0.5 K/UL (ref 0.3–1)
MONOCYTES # BLD AUTO: 0.5 K/UL (ref 0.3–1)
MONOCYTES NFR BLD: 6.7 % (ref 4–15)
MONOCYTES NFR BLD: 6.7 % (ref 4–15)
NEUTROPHILS # BLD AUTO: 6.2 K/UL (ref 1.8–7.7)
NEUTROPHILS # BLD AUTO: 6.2 K/UL (ref 1.8–7.7)
NEUTROPHILS NFR BLD: 80.3 % (ref 38–73)
NEUTROPHILS NFR BLD: 80.3 % (ref 38–73)
NRBC BLD-RTO: 0 /100 WBC
NRBC BLD-RTO: 0 /100 WBC
PCO2 BLDA: 31.7 MMHG (ref 35–45)
PH SMN: 7.4 [PH] (ref 7.35–7.45)
PLATELET # BLD AUTO: 92 K/UL (ref 150–350)
PLATELET # BLD AUTO: 92 K/UL (ref 150–350)
PMV BLD AUTO: 11.1 FL (ref 9.2–12.9)
PMV BLD AUTO: 11.1 FL (ref 9.2–12.9)
PO2 BLDA: 91 MMHG (ref 80–100)
POC BE: -5 MMOL/L
POC SATURATED O2: 97 % (ref 95–100)
POC TCO2: 20 MMOL/L (ref 23–27)
POCT GLUCOSE: 142 MG/DL (ref 70–110)
POCT GLUCOSE: 144 MG/DL (ref 70–110)
POCT GLUCOSE: 151 MG/DL (ref 70–110)
POCT GLUCOSE: 279 MG/DL (ref 70–110)
POTASSIUM SERPL-SCNC: 4.8 MMOL/L (ref 3.5–5.1)
PROTHROMBIN TIME: 11.9 SEC (ref 9–12.5)
RBC # BLD AUTO: 3.33 M/UL (ref 4.6–6.2)
RBC # BLD AUTO: 3.33 M/UL (ref 4.6–6.2)
SAMPLE: ABNORMAL
SITE: ABNORMAL
SODIUM SERPL-SCNC: 134 MMOL/L (ref 136–145)
WBC # BLD AUTO: 7.66 K/UL (ref 3.9–12.7)
WBC # BLD AUTO: 7.66 K/UL (ref 3.9–12.7)

## 2019-03-24 PROCEDURE — 63600175 PHARM REV CODE 636 W HCPCS: Mod: JG | Performed by: NURSE PRACTITIONER

## 2019-03-24 PROCEDURE — 94761 N-INVAS EAR/PLS OXIMETRY MLT: CPT

## 2019-03-24 PROCEDURE — 20000000 HC ICU ROOM

## 2019-03-24 PROCEDURE — 99233 PR SUBSEQUENT HOSPITAL CARE,LEVL III: ICD-10-PCS | Mod: ,,, | Performed by: HOSPITALIST

## 2019-03-24 PROCEDURE — 99223 PR INITIAL HOSPITAL CARE,LEVL III: ICD-10-PCS | Mod: GC,,, | Performed by: PSYCHIATRY & NEUROLOGY

## 2019-03-24 PROCEDURE — 94640 AIRWAY INHALATION TREATMENT: CPT

## 2019-03-24 PROCEDURE — 99223 1ST HOSP IP/OBS HIGH 75: CPT | Mod: GC,,, | Performed by: PSYCHIATRY & NEUROLOGY

## 2019-03-24 PROCEDURE — 85610 PROTHROMBIN TIME: CPT

## 2019-03-24 PROCEDURE — 27000221 HC OXYGEN, UP TO 24 HOURS

## 2019-03-24 PROCEDURE — 99291 CRITICAL CARE FIRST HOUR: CPT | Mod: 25,,, | Performed by: NURSE PRACTITIONER

## 2019-03-24 PROCEDURE — 85730 THROMBOPLASTIN TIME PARTIAL: CPT

## 2019-03-24 PROCEDURE — 82803 BLOOD GASES ANY COMBINATION: CPT

## 2019-03-24 PROCEDURE — 85025 COMPLETE CBC W/AUTO DIFF WBC: CPT

## 2019-03-24 PROCEDURE — 36620 INSERTION CATHETER ARTERY: CPT | Mod: ,,, | Performed by: NURSE PRACTITIONER

## 2019-03-24 PROCEDURE — 63600175 PHARM REV CODE 636 W HCPCS: Performed by: HOSPITALIST

## 2019-03-24 PROCEDURE — 25000003 PHARM REV CODE 250: Performed by: HOSPITALIST

## 2019-03-24 PROCEDURE — 25000242 PHARM REV CODE 250 ALT 637 W/ HCPCS: Performed by: INTERNAL MEDICINE

## 2019-03-24 PROCEDURE — 80048 BASIC METABOLIC PNL TOTAL CA: CPT

## 2019-03-24 PROCEDURE — 99900035 HC TECH TIME PER 15 MIN (STAT)

## 2019-03-24 PROCEDURE — 99291 PR CRITICAL CARE, E/M 30-74 MINUTES: ICD-10-PCS | Mod: 25,,, | Performed by: NURSE PRACTITIONER

## 2019-03-24 PROCEDURE — 99233 SBSQ HOSP IP/OBS HIGH 50: CPT | Mod: ,,, | Performed by: HOSPITALIST

## 2019-03-24 PROCEDURE — 63600175 PHARM REV CODE 636 W HCPCS: Performed by: INTERNAL MEDICINE

## 2019-03-24 PROCEDURE — 25000003 PHARM REV CODE 250: Performed by: INTERNAL MEDICINE

## 2019-03-24 PROCEDURE — 25500020 PHARM REV CODE 255: Performed by: HOSPITALIST

## 2019-03-24 PROCEDURE — 36415 COLL VENOUS BLD VENIPUNCTURE: CPT

## 2019-03-24 PROCEDURE — 36600 WITHDRAWAL OF ARTERIAL BLOOD: CPT

## 2019-03-24 PROCEDURE — 36620 ARTERIAL LINE: ICD-10-PCS | Mod: ,,, | Performed by: NURSE PRACTITIONER

## 2019-03-24 PROCEDURE — P9045 ALBUMIN (HUMAN), 5%, 250 ML: HCPCS | Mod: JG | Performed by: NURSE PRACTITIONER

## 2019-03-24 RX ORDER — SODIUM CHLORIDE 9 MG/ML
INJECTION, SOLUTION INTRAVENOUS
Status: DISCONTINUED | OUTPATIENT
Start: 2019-03-25 | End: 2019-03-26

## 2019-03-24 RX ORDER — ALBUMIN HUMAN 50 G/1000ML
12.5 SOLUTION INTRAVENOUS ONCE
Status: COMPLETED | OUTPATIENT
Start: 2019-03-24 | End: 2019-03-24

## 2019-03-24 RX ORDER — PREDNISONE 20 MG/1
40 TABLET ORAL DAILY
Status: DISCONTINUED | OUTPATIENT
Start: 2019-03-25 | End: 2019-03-25

## 2019-03-24 RX ORDER — SODIUM CHLORIDE 9 MG/ML
INJECTION, SOLUTION INTRAVENOUS ONCE
Status: COMPLETED | OUTPATIENT
Start: 2019-03-25 | End: 2019-03-25

## 2019-03-24 RX ORDER — HEPARIN SODIUM 5000 [USP'U]/ML
5000 INJECTION, SOLUTION INTRAVENOUS; SUBCUTANEOUS EVERY 8 HOURS
Status: DISCONTINUED | OUTPATIENT
Start: 2019-03-24 | End: 2019-03-25

## 2019-03-24 RX ADMIN — AZITHROMYCIN 250 MG: 250 TABLET, FILM COATED ORAL at 08:03

## 2019-03-24 RX ADMIN — INSULIN ASPART 3 UNITS: 100 INJECTION, SOLUTION INTRAVENOUS; SUBCUTANEOUS at 11:03

## 2019-03-24 RX ADMIN — ALLOPURINOL 100 MG: 100 TABLET ORAL at 08:03

## 2019-03-24 RX ADMIN — TORSEMIDE 20 MG: 20 TABLET ORAL at 10:03

## 2019-03-24 RX ADMIN — PREDNISONE 40 MG: 20 TABLET ORAL at 08:03

## 2019-03-24 RX ADMIN — CARVEDILOL 25 MG: 25 TABLET, FILM COATED ORAL at 08:03

## 2019-03-24 RX ADMIN — MIRTAZAPINE 7.5 MG: 7.5 TABLET ORAL at 10:03

## 2019-03-24 RX ADMIN — PANTOPRAZOLE SODIUM 40 MG: 40 TABLET, DELAYED RELEASE ORAL at 08:03

## 2019-03-24 RX ADMIN — ATORVASTATIN CALCIUM 80 MG: 20 TABLET, FILM COATED ORAL at 10:03

## 2019-03-24 RX ADMIN — IPRATROPIUM BROMIDE AND ALBUTEROL SULFATE 3 ML: .5; 3 SOLUTION RESPIRATORY (INHALATION) at 12:03

## 2019-03-24 RX ADMIN — IPRATROPIUM BROMIDE AND ALBUTEROL SULFATE 3 ML: .5; 3 SOLUTION RESPIRATORY (INHALATION) at 08:03

## 2019-03-24 RX ADMIN — IPRATROPIUM BROMIDE AND ALBUTEROL SULFATE 3 ML: .5; 3 SOLUTION RESPIRATORY (INHALATION) at 04:03

## 2019-03-24 RX ADMIN — FLUTICASONE PROPIONATE 100 MCG: 50 SPRAY, METERED NASAL at 09:03

## 2019-03-24 RX ADMIN — ALBUMIN HUMAN 12.5 G: 0.05 INJECTION, SOLUTION INTRAVENOUS at 04:03

## 2019-03-24 RX ADMIN — SERTRALINE HYDROCHLORIDE 25 MG: 25 TABLET ORAL at 08:03

## 2019-03-24 RX ADMIN — TAMSULOSIN HYDROCHLORIDE 0.4 MG: 0.4 CAPSULE ORAL at 08:03

## 2019-03-24 RX ADMIN — INSULIN DETEMIR 12 UNITS: 100 INJECTION, SOLUTION SUBCUTANEOUS at 08:03

## 2019-03-24 RX ADMIN — IPRATROPIUM BROMIDE AND ALBUTEROL SULFATE 3 ML: .5; 3 SOLUTION RESPIRATORY (INHALATION) at 05:03

## 2019-03-24 RX ADMIN — LOSARTAN POTASSIUM 100 MG: 50 TABLET, FILM COATED ORAL at 10:03

## 2019-03-24 RX ADMIN — IPRATROPIUM BROMIDE AND ALBUTEROL SULFATE 3 ML: .5; 3 SOLUTION RESPIRATORY (INHALATION) at 11:03

## 2019-03-24 RX ADMIN — IOHEXOL 100 ML: 350 INJECTION, SOLUTION INTRAVENOUS at 12:03

## 2019-03-24 RX ADMIN — ASPIRIN 81 MG: 81 TABLET, COATED ORAL at 08:03

## 2019-03-24 RX ADMIN — INSULIN ASPART 2 UNITS: 100 INJECTION, SOLUTION INTRAVENOUS; SUBCUTANEOUS at 08:03

## 2019-03-24 RX ADMIN — FINASTERIDE 5 MG: 5 TABLET, FILM COATED ORAL at 08:03

## 2019-03-24 RX ADMIN — TORSEMIDE 20 MG: 20 TABLET ORAL at 08:03

## 2019-03-24 RX ADMIN — HEPARIN SODIUM 5000 UNITS: 5000 INJECTION, SOLUTION INTRAVENOUS; SUBCUTANEOUS at 10:03

## 2019-03-24 RX ADMIN — GABAPENTIN 300 MG: 300 CAPSULE ORAL at 10:03

## 2019-03-24 NOTE — CONSULTS
[facility]  Nephrology  Consult Note    Patient Name: Micheal Hunt  MRN: [id number]  Admission Date: 3/22/2019  Hospital Length of Stay: 1 days  Attending Provider: Constance Grimes MD   Primary Care Physician: Pk Lakhani MD  Principal Problem:Unstable angina    Inpatient consult to Nephrology  Consult performed by: ROSALBA Summers  Consult ordered by: Jeana Stark MD  Reason for consult: ESRD Management        Subjective:     HPI: 79 y/o male with PMH ESRD on HD,  HTN, HLD, CAD (s/p CABG 2007), paroxysmal AFib, chronic diastolic heart failure, asthma, T2DM, BPH, recent hospitalization 2/2 PNA (3/11/19)  transferred from [facility] for evaluation of unstable angina    History obtained from the patient and the medical chart        Past Medical History:   Diagnosis Date    NICK (acute kidney injury) 7/29/2016    Allergy     Anemia, mild 12/15/2014    Arthritis     Gout    Benign essential HTN 3/27/2012    BMI 29.0-29.9,adult 5/10/2018    BPH (benign prostatic hyperplasia)     BPH (benign prostatic hyperplasia)     CAD (coronary artery disease) 2006    Chronic kidney disease     due to ibuprofen    Colon polyp     CRF (chronic renal failure), stage 5     Diverticulosis     Gastritis     GERD (gastroesophageal reflux disease)     Gout     History of colon polyps 5/3/2018    HTN (hypertension) 3/27/2012    Hyperlipidemia     Hyperlipidemia     LLL pneumonia 6/14/2018    LVH (left ventricular hypertrophy)     Mesenteric ischemia     Murmur, cardiac 3/27/2012    GRICELDA (obstructive sleep apnea)     DOES NOT USE A MACHINE    Sinus problem     Syncope and collapse        Past Surgical History:   Procedure Laterality Date    APPENDECTOMY      BLOCK-NERVE Left 5/31/2016    [facility] OR    CARDIAC CATHETERIZATION      COLONOSCOPY  2011    COLONOSCOPY N/A 5/3/2018    [facility] ENDO    COLONOSCOPY  N/A 9/10/2015    [facility]    CORONARY ARTERY BYPASS GRAFT  4/2007    x 1    CYSTOSCOPY N/A 8/30/2017    [facility]formed by [facility] at [facility] OR    CYSTOSCOPY N/A 11/10/2015    [facility] at [facility] OR    ESOPHAGOGASTRODUODENOSCOPY (EGD) N/A 1/4/2016    Performed by Tra [facility] at [facility] ENDO (2ND FLR)    ESOPHAGOGASTRODUODENOSCOPY (EGD) N/A 10/15/2014    Per[facility] by [facility] at [facility] ENDO    INJECTION-STEROID-EPIDURAL-TRANSFORAMINAL Left 2/25/2016    Performed by [facility] at [facility] OR    JOINT REPLACEMENT      left knee total replacement  X 3    mid leftt finger      from a cactuss    MOLE REMOVAL  2016    RADIOFREQUENCY THERMOCOAGULATION (RFTC)-NERVE-MEDIAN BRANCH-LUMBAR Left 4/11/2016    Performed by [facility] at [facility] OR    rotative cuff      no rotative cuffs on bilat shoulders has pins     SHOULDER SURGERY      shoulder surgery bilat  RIGHT X 4; LEFT X 3    TRANSRECTAL ULTRASOUND GUIDED PROSTATE BIOPSY Bilateral 11/10/2015    [facility] at [facility] OR    ULTRASOUND-ENDOSCOPIC-UPPER N/A 5/31/2017    Performed by Tra [facility] at [facility] ENDO (2ND FLR)    ULTRASOUND-ENDOSCOPIC-UPPER N/A 1/4/2016    Performed by Tra [facility] at [facility] ENDO (2ND FLR)    ULTRASOUND-ENDOSCOPIC-UPPER N/A 1/16/2015    [facility]       Review of patient's allergies indicates:   Allergen Reactions    Ace inhibitors Other (See Comments)     Cough    Arb-angiotensin receptor antagonist Itching    Eplerenone Other (See Comments)     Marked bradycardia, 40, tiredness and weakness      Sulfa (sulfonamide antibiotics) Itching     Patient says this was 10 years ago and doesn't remember what happened     Current Facility-Administered Medications   Medication Frequency    0.9%  NaCl infusion PRN    acetaminophen tablet 650 mg Q6H PRN    albuterol-ipratropium 2.5 mg-0.5  mg/3 mL nebulizer solution 3 mL Q4H    albuterol-ipratropium 2.5 mg-0.5 mg/3 mL nebulizer solution 3 mL Q6H PRN    allopurinol tablet 100 mg Daily    aspirin EC tablet 81 mg Daily    atorvastatin tablet 80 mg Daily    azithromycin tablet 250 mg Daily    carvedilol tablet 25 mg BID WM    dextrose 50% injection 12.5 g PRN    dextrose 50% injection 25 g PRN    finasteride tablet 5 mg Daily    fluticasone 50 mcg/actuation nasal spray 100 mcg Daily    gabapentin capsule 300 mg QHS    glucagon (human recombinant) injection 1 mg PRN    glucose chewable tablet 16 g PRN    glucose chewable tablet 24 g PRN    heparin 25,000 units in dextrose 5% (100 units/ml) IV bolus from bag - ADDITIONAL PRN BOLUS - 30 units/kg PRN    heparin 25,000 units in dextrose 5% (100 units/ml) IV bolus from bag - ADDITIONAL PRN BOLUS - 60 units/kg PRN    heparin 25,000 units in dextrose 5% 250 mL (100 units/mL) infusion LOW INTENSITY nomogram - [facility] Continuous    insulin aspart U-100 pen 1-10 Units QID (AC + HS) PRN    insulin detemir U-100 pen 12 Units Daily    losartan tablet 100 mg QHS    magnesium oxide tablet 800 mg PRN    magnesium oxide tablet 800 mg PRN    meclizine tablet 25 mg TID PRN    mirtazapine tablet 7.5 mg QHS    nitroGLYCERIN SL tablet 0.4 mg Q5 Min PRN    ondansetron tablet 8 mg Q6H PRN    pantoprazole EC tablet 40 mg Daily    potassium chloride 10% oral solution 40 mEq PRN    potassium chloride 10% oral solution 40 mEq PRN    predniSONE tablet 40 mg Daily    senna tablet 8.6 mg Daily PRN    sertraline tablet 25 mg Daily    [START ON 3/29/2019] sertraline tablet 50 mg Daily    tamsulosin 24 hr capsule 0.4 mg Daily    torsemide tablet 20 mg BID    traMADol tablet 50 mg Q8H PRN     Family History     Problem Relation (Age of Onset)    Cancer Father    Heart disease Mother, Sister    Hypertension Brother    Pneumonia Sister    Stroke Sister    Sudden death Father        Tobacco Use    Smoking  "status: Never Smoker    Smokeless tobacco: Never Used   Substance and Sexual Activity    Alcohol use: No    Drug use: No    Sexual activity: Not on file     Review of Systems   Constitutional: Negative.    HENT: Negative.    Eyes: Negative.    Respiratory: Positive for shortness of breath.         Pt states "can't breathe well after dialysis. Started havin burnin of the chest and need a valve replacement in my heart. Feel like I get cold all the time cause the fluid in my heart is leakin into the chamber". Pt reports had been getting dialysis "4 days in a row" at his OP HD unit and states that he has had to stop 2/2 cramping   Cardiovascular: Negative.    Gastrointestinal: Negative.    Endocrine: Negative.    Genitourinary: Negative.    Musculoskeletal: Positive for back pain.        Pt reports back pain "from layin' down"   Skin: Negative.    Allergic/Immunologic: Negative.    Neurological: Negative.    Hematological: Bruises/bleeds easily.   Psychiatric/Behavioral: Negative.      Objective:     Vital Signs (Most Recent):  Temp: 98.4 °F (36.9 °C) (03/23/19 1559)  Pulse: 67 (03/23/19 1559)  Resp: 16 (03/23/19 1559)  BP: (!) 145/88 (03/23/19 1645)  SpO2: 98 % (03/23/19 1559)  O2 Device (Oxygen Therapy): room air (03/23/19 1559) Vital Signs (24h Range):  Temp:  [97.5 °F (36.4 °C)-98.4 °F (36.9 °C)] 98.4 °F (36.9 °C)  Pulse:  [64-87] 67  Resp:  [16-18] 16  SpO2:  [94 %-98 %] 98 %  BP: (113-145)/(70-88) 145/88     Weight: 106.6 kg (235 lb) (03/23/19 0823)  Body mass index is 31 kg/m².  Body surface area is 2.34 meters squared.    I/O last 3 completed shifts:  In: 120 [P.O.:120]  Out: 0     Physical Exam   Constitutional: He is oriented to person, place, and time. He appears well-developed and well-nourished.   HENT:   Head: Normocephalic and atraumatic.   Eyeglasses to face   Eyes: Conjunctivae and EOM are normal. Pupils are equal, round, and reactive to light.   Neck: Normal range of motion. Neck supple. "   Cardiovascular: Normal heart sounds and intact distal pulses.   Irregular rate and rhythm   Pulmonary/Chest: Effort normal. He has wheezes.   Shortness of breath; on RA   Abdominal: Soft. Bowel sounds are normal.   Musculoskeletal: Normal range of motion.   Neurological: He is alert and oriented to person, place, and time.   Skin: Skin is warm and dry. Capillary refill takes less than 2 seconds.   AVF to L upper arm with +thrill/+bruit. Ecchymosis to L arm. PIV to R lower arm   Psychiatric: He has a normal mood and affect. His behavior is normal. Judgment and thought content normal.   Nursing note and vitals reviewed.      Significant Labs:  All labs within the past 24 hours have been reviewed.    Significant Imaging:  Labs: Reviewed  ECG: Reviewed  X-Ray: Reviewed  Echo: Reviewed    Assessment/Plan:     ESRD (end stage renal disease) on dialysis    -No need for emergent dialysis today  -pt medicated with furosemide 100 mg IV and torsemide 20 mg po today per MAR  -strict I/Os and chart  -daily standing weights and chart  -Obtain OP HD medical record  -maintain HD schedule while inpatient  -Hep B sAg negative 3/11/19   -Ph  -daily CBC, renal function panel  -Renal diet  -1 liter fluid restrictions per day  -Medications to renal parameter    Dialysis Information: [facility]  -Out patient HD unit: [facility]  -Nephrologist: Dr. Valderrama in [place]  -HD tx days: MWF  -HD tx time: 4 hours  -HD access: AVF   -Last HD: yesterday  -EDW: 106.0kg  -RRF: yes    Anemia  -Hb > 7 gm/dL    HTN  - Maintain MAP >65  - Keep target Bp <140/90    Ca 7.1  - Albumin  - Discussed with Hospital Medicine    Discussed with RN to obtain O2 sat     Severe persistent asthma with acute exacerbation    -Managed by Hospital Medicine         Thank you for your consult. I will follow-up with patient. Please contact us if you have any additional questions.    ROSALBA Elena  Nephrology  [facility]

## 2019-03-24 NOTE — PLAN OF CARE
Problem: Adult Inpatient Plan of Care  Goal: Plan of Care Review  Outcome: Revised  Pt free of falls/trauma/injuries.  Denies c/o SOB, CP. Pt c/o back pain being treated with PO analgesics.  Generalized skin remains CDI; Mild edema noted.  Pt being diuresed with PO Torsemide; diuresing well.   Wt decreased since yesterday.   Explained  fluid restriction with pt.  Pt tolerating plan of care.

## 2019-03-24 NOTE — NURSING
BIO=749/82, HR=81, TQ=912, GKH=471.  MD Narayan neuro vasc at bedside to assess. Plan for CT head

## 2019-03-24 NOTE — ASSESSMENT & PLAN NOTE
Permissive HTN  Maintain -220  Keep patient flat  EKG  ECHO  Cardiac Monitoring  Continue Carvedilol  Continue Losartan  Continue Toresemide

## 2019-03-24 NOTE — ASSESSMENT & PLAN NOTE
Likely embolic, cardiac given his history of Afib and CAD    Patient is not a TPA candidate had symptoms for more than 4.5 hours which was confirmed by present of flare on MRI scan.  Symptoms improved with laying flat, likely flow depended    - keep patient flat     Antithrombotics for secondary stroke prevention: Antiplatelets: Aspirin: 81 mg daily    Statins for secondary stroke prevention and hyperlipidemia, if present:   Statins: Atorvastatin- 40 mg daily    Aggressive risk factor modification: HTN, DM, HLD, A-Fib, CAD     Rehab efforts: PT/OT/SLP to evaluate and treat, PM&R consult     Diagnostics ordered/pending: Carotid ultrasound to assess vasculature    VTE prophylaxis: Heparin 5000 units SQ every 8 hours    BP parameters: Infarct: No intervention, SBP <220

## 2019-03-24 NOTE — PROGRESS NOTES
[facility] Hospital Medicine  Progress Note    Primary Team: [facility] HOSP MED C  Admit Date: 3/22/2019    Subjective:        Interval History:  Stroke code called for new R weakness and dysarthria. Acute L PCA infarct seen on MRI. Symptoms improved with laying flat per Vascular Neuro staff. Daughter reports that he seems disoriented today. Still with intermittent stable chest pain and feels short of breath. Also complaining of worsened abdominal distention without pain, nausea, vomiting, or constipation. Transferring to Neuro ICU.     ROS:  General: no fever, no chills, no weight loss, no fatigue  Cardiovascular: +chest pain, no orthopnea, +dyspnea  Respiratory: no cough, no wheezes, +SOB  GI: +abdominal swelling, no abdominal pain, no n/v, no diarrhea/constipation  Neuro: right weakness, dysarthria,     Objective:   Last 24 Hour Vital Signs:  BP  Min: 136/80  Max: 175/80  Temp  Av.1 °F (36.7 °C)  Min: 97.4 °F (36.3 °C)  Max: 99 °F (37.2 °C)  Pulse  Av.1  Min: 65  Max: 82  Resp  Av.3  Min: 16  Max: 18  SpO2  Av.4 %  Min: 93 %  Max: 100 %  Weight  Av.6 kg (235 lb 0.2 oz)  Min: 106.6 kg (235 lb 0.2 oz)  Max: 106.6 kg (235 lb 0.2 oz)  I/O last 3 completed shifts:  In: 690 [P.O.:690]  Out: 1326 [Urine:1325; Stool:1]    Physical Examination:  GEN: alert, oriented x 4, appears distressed  HEENT: NCAT, MMM, PERRL, EOMI, oropharynx clear  CV: irregularly irregular, no m/r  RESP: +wheezes, no crackles, no increased WOB  ABD: soft, NT, abdominal distension, normoactive BS, no organomegaly  EXTR: no c/c/e, intact distal pulses x 4  NEURO: PERRL, EOMI, RUE 3/5 & RLE 4/5 motor strength, decreased sensation on R side of body, dysarthria, slow with answering questions, no facial droop  SKIN: no rashes, lesions, or color changes  PSYCH: normal affect    Laboratory:  I have reviewed all pertinent lab results/findings within the past 24 hours.    Radiology:  I have reviewed all pertinent  imaging results/findings within the past 24 hours.    Current Medications:     Infusions:       Scheduled:   albuterol-ipratropium  3 mL Nebulization Q4H    allopurinol  100 mg Oral Daily    aspirin  81 mg Oral Daily    atorvastatin  80 mg Oral Daily    azithromycin  250 mg Oral Daily    carvedilol  25 mg Oral BID WM    finasteride  5 mg Oral Daily    fluticasone  2 spray Each Nare Daily    gabapentin  300 mg Oral QHS    heparin (porcine)  5,000 Units Subcutaneous Q8H    insulin detemir U-100  12 Units Subcutaneous Daily    losartan  100 mg Oral QHS    mirtazapine  7.5 mg Oral QHS    pantoprazole  40 mg Oral Daily    sertraline  25 mg Oral Daily    [START ON 3/29/2019] sertraline  50 mg Oral Daily    tamsulosin  1 capsule Oral Daily    torsemide  20 mg Oral BID        PRN:  sodium chloride 0.9%, acetaminophen, albuterol-ipratropium, dextrose 50%, dextrose 50%, glucagon (human recombinant), glucose, glucose, insulin aspart U-100, magnesium oxide, magnesium oxide, meclizine, nitroGLYCERIN, ondansetron, potassium chloride 10%, potassium chloride 10%, senna, traMADol      Assessment/Plan:     Micheal Hunt is a 78 y.o.male with    Acute stroke  - MRI with L PCA stroke and high grade PCA stenosis with symptoms improved with laying flat; per Vascular Neuro not a TPA candidate  - Cont ASA, statin  - Permissive HTN  - Neuro checks  - Transfer to Neuro ICU    Unstable angina  CAD s/p CABG '07  - monitor for signs of ACS  - tele monitoring  - troponin 0.8, flat  - continue aspirin, statin  - ACS protocol was not recommended by cardiology  - consulted Interventional Cardiology in for [facility]  - nitro p.r.n.     Chronic diastolic HF  - cont home torsemide  - TTE 3/23: EF 55%, moderate AS, PAP 41, severe VICENTE, patent foramen ovale, pHTN, normal CVP    Acute asthma exacerbation  - continue azithromycin & scheduled duonebs  - was seen by Pulm at OSH  - still with persistent wheezing and feels short of breath -  Pulmonology consult placed  - satting well on room air  - recommend ABG     ESRD on HD MWF  - renally adjust all meds  - Nephrology consulted for regular HD     Paroxysmal Afib  - rate controlled  - patient refused coumadin  - chadsvasc 6  - initiated on heparin gtt until [facility] but patient had feelings of euphoria during infusion that resolved after discontinued  - anticoagulation per Neuro ICU  - tele monitoring  - continue beta-blocker    HTN  - chronic and stable  - permissive HTN     DM2   - bedside glucose monitoring  - ISS  - continue Levemir 12 units daily  - diabetic diet     BPH  - chronic and stable  - continue Flomax and Proscar    Gout  - continue allopurinol  - chronic and stable     Newly diagnosed depression  - started on sertraline at outside hospital, continue         Diet: renal, cardiac, fluid restricted  Dispo: transfer to Neuro ICU    Constance Grimes MD  Hospital Medicine Staff  [facility]

## 2019-03-24 NOTE — ASSESSMENT & PLAN NOTE
chadsvasc 6  stroke risk factor   on coreg for rate control   Need to discuss risk and benefit of anticoagulation and start prior to discharge

## 2019-03-24 NOTE — SUBJECTIVE & OBJECTIVE
Past Medical History:   Diagnosis Date    NICK (acute kidney injury) 7/29/2016    Allergy     Anemia, mild 12/15/2014    Arthritis     Gout    Benign essential HTN 3/27/2012    BMI 29.0-29.9,adult 5/10/2018    BPH (benign prostatic hyperplasia)     BPH (benign prostatic hyperplasia)     CAD (coronary artery disease) 2006    Chronic kidney disease     due to ibuprofen    Colon polyp     CRF (chronic renal failure), stage 5     Diverticulosis     Gastritis     GERD (gastroesophageal reflux disease)     Gout     History of colon polyps 5/3/2018    HTN (hypertension) 3/27/2012    Hyperlipidemia     Hyperlipidemia     LLL pneumonia 6/14/2018    LVH (left ventricular hypertrophy)     Mesenteric ischemia     Murmur, cardiac 3/27/2012    GRICELDA (obstructive sleep apnea)     DOES NOT USE A MACHINE    Sinus problem     Syncope and collapse      Past Surgical History:   Procedure Laterality Date    APPENDECTOMY      BLOCK-NERVE Left 5/31/2016    [facility]    CARDIAC CATHETERIZATION      COLONOSCOPY  2011    COLONOSCOPY N/A 5/3/2018    [facility]formed [facility] ENDO    COLONOSCOPY N/A 9/10/2015    [facility]    CORONARY ARTERY BYPASS GRAFT  4/2007    x 1    CYSTOSCOPY N/A 8/30/2017    [facility] at [facility]    CYSTOSCOPY N/A 11/10/2015    [facility] OR    ESOPHAGOGASTRODUODENOSCOPY (EGD) N/A 1/4/2016    [facility] (2ND [facility]    ESOPHAGOGASTRODUODENOSCOPY (EGD) N/A 10/15/2014    Performed by [facility]i [facility] at [facility] ENDO    INJECTION-STEROID-EPIDURAL-TRANSFORAMINAL Left 2/25/2016    [facility] OR    JOINT REPLACEMENT      left knee total replacement  X 3    mid leftt finger      from a cactuss    MOLE REMOVAL  2016    RADIOFREQUENCY THERMOCOAGULATION (RFTC)-NERVE-MEDIAN BRANCH-LUMBAR Left  2016    [facility]    rotative cuff      no rotative cuffs on bilat shoulders has pins     SHOULDER SURGERY      shoulder surgery bilat  RIGHT X 4; LEFT X 3    TRANSRECTAL ULTRASOUND GUIDED PROSTATE BIOPSY Bilateral 11/10/2015    [facility]    ULTRASOUND-ENDOSCOPIC-UPPER N/A 2017    [facility] at [facility] ENDO (2ND FLR)    ULTRASOUND-ENDOSCOPIC-UPPER N/A 2016    Performed by Tra [facility] at [facility] ENDO (2ND FLR)    ULTRASOUND-ENDOSCOPIC-UPPER N/A 2015    Performed by Jason Saleem MD at [facility] ENDO (2ND FLR)     Family History   Problem Relation Age of Onset    Heart disease Mother     Sudden death Father     Cancer Father         advanced lung ca- found after 2 story fall    Stroke Sister     Pneumonia Sister          from PNA    Heart disease Sister     Hypertension Brother     Kidney cancer Neg Hx     Prostate cancer Neg Hx     Urolithiasis Neg Hx     Allergic rhinitis Neg Hx     Allergies Neg Hx     Angioedema Neg Hx     Asthma Neg Hx     Atopy Neg Hx     Eczema Neg Hx     Immunodeficiency Neg Hx     Rhinitis Neg Hx     Urticaria Neg Hx      Social History     Tobacco Use    Smoking status: Never Smoker    Smokeless tobacco: Never Used   Substance Use Topics    Alcohol use: No    Drug use: No     Review of patient's allergies indicates:   Allergen Reactions    Ace inhibitors Other (See Comments)     Cough    Arb-angiotensin receptor antagonist Itching    Eplerenone Other (See Comments)     Marked bradycardia, 40, tiredness and weakness      Sulfa (sulfonamide antibiotics) Itching     Patient says this was 10 years ago and doesn't remember what happened       Medications: I have reviewed the current medication administration record.    Facility-Administered Medications Prior to Admission   Medication Dose Route Frequency Provider Last Rate Last Dose    albuterol nebulizer  solution 1.25 mg  1.25 mg Nebulization Q6H PRN Telma Powers, NP   1.25 mg at 02/26/19 1134     Medications Prior to Admission   Medication Sig Dispense Refill Last Dose    albuterol (PROVENTIL/VENTOLIN HFA) 90 mcg/actuation inhaler 2 puffs every 4 hours as needed for cough, wheeze, or shortness of breath 3 Inhaler 3 3/22/2019 at 0400    allopurinol (ZYLOPRIM) 100 MG tablet TAKE 1 TABLET BY MOUTH EVERY DAY 90 tablet 1 3/22/2019 at 0900    aspirin (ECOTRIN) 81 MG EC tablet Take 81 mg by mouth once daily.     3/22/2019 at 100    atorvastatin (LIPITOR) 80 MG tablet TAKE 1 TABLET (80 MG TOTAL) BY MOUTH ONCE DAILY. 90 tablet 3 3/22/2019 at 1000    budesonide-formoterol 160-4.5 mcg (SYMBICORT) 160-4.5 mcg/actuation HFAA Inhale 2 puffs into the lungs every 12 (twelve) hours. Controller 3 Inhaler 4 3/22/2019 at Unknown time    carvedilol (COREG) 6.25 MG tablet Take 1 tablet (6.25 mg total) by mouth 2 (two) times daily with meals. 60 tablet 3 3/22/2019 at Unknown time    celecoxib (CELEBREX) 200 MG capsule Take 1 capsule (200 mg total) by mouth once daily. 30 capsule 3 3/22/2019 at Unknown time    finasteride (PROSCAR) 5 mg tablet TAKE 1 TABLET ONCE DAILY. 90 tablet 3 3/22/2019 at Unknown time    gabapentin (NEURONTIN) 300 MG capsule Take 1 capsule (300 mg total) by mouth every evening. 90 capsule 3 3/22/2019 at Unknown time    isosorbide mononitrate (ISMO,MONOKET) 10 mg tablet TAKE 1 TABLET BY MOUTH EVERY DAY IN THE EVENING 30 tablet 3 3/22/2019 at Unknown time    losartan (COZAAR) 100 MG tablet TAKE 1 TABLET BY MOUTH EVERY DAY 90 tablet 0 3/22/2019 at Unknown time    mirtazapine (REMERON) 7.5 MG Tab TAKE 1 TABLET BY MOUTH EVERY EVENING. 30 tablet 2 3/21/2019 at Unknown time    omeprazole (PRILOSEC) 20 MG capsule TAKE 1 CAPSULE BY MOUTH EVERY DAY 30 capsule 1 3/22/2019 at Unknown time    predniSONE (DELTASONE) 20 MG tablet 3 pills for 3 days, 2 pills for 3 days, 1 pill for 3 days, repeat if needed. 36  tablet 0 3/22/2019 at Unknown time    tamsulosin (FLOMAX) 0.4 mg Cap TAKE 1 CAPSULE BY MOUTH EVERY DAY 30 capsule 2 3/22/2019 at Unknown time    torsemide (DEMADEX) 20 MG Tab Take 1 tablet (20 mg total) by mouth 2 (two) times daily. 60 tablet 1 3/22/2019 at Unknown time    albuterol-ipratropium (DUO-NEB) 2.5 mg-0.5 mg/3 mL nebulizer solution Take 3 mLs by nebulization every 6 (six) hours as needed for Wheezing or Shortness of Breath. 270 mL 0 3/10/2019 at Unknown time    fluticasone (FLONASE) 50 mcg/actuation nasal spray 2 sprays (100 mcg total) by Each Nare route once daily. 3 Bottle 3 3/10/2019 at Unknown time    meclizine (ANTIVERT) 25 mg tablet TAKE 1 TABLET BY MOUTH THREE TIMES A DAY AS NEEDED 90 tablet 0 Past Week at Unknown time    meclizine (ANTIVERT) 25 mg tablet Take 1 tablet (25 mg total) by mouth 3 (three) times daily as needed for Dizziness. 30 tablet 0 Past Week at Unknown time    NITROSTAT 0.4 mg SL tablet PLACE 1 TABLET (0.4 MG TOTAL) UNDER THE TONGUE EVERY 5 (FIVE) MINUTES AS NEEDED FOR CHEST PAIN. 25 tablet 3 Unknown at Unknown time    sodium chloride (SALINE NASAL MIST) 3 % Mist 1 spray by Nasal route 2 (two) times daily.   Past Month at Unknown time    tiotropium bromide (SPIRIVA RESPIMAT) 1.25 mcg/actuation Mist Inhale 2.5 mcg into the lungs once daily. Controller 4 g 5 Unknown at Unknown time    warfarin (COUMADIN) 7.5 MG tablet Take 1 tablet (7.5 mg total) by mouth Daily. 30 tablet 1 Unknown at Unknown time    zolpidem (AMBIEN) 5 MG Tab TAKE 1 TABLET BY MOUTH EVERY EVENING AS NEEDED 30 tablet 3 Past Week at Unknown time    zolpidem (AMBIEN) 5 MG Tab TAKE 1 TABLET BY MOUTH EVERY EVENING AS NEEDED 30 tablet 3 Past Week at Unknown time       Review of Systems   Constitutional: Positive for activity change and fatigue. Negative for chills.   Respiratory: Positive for cough. Negative for wheezing.    Cardiovascular: Negative for palpitations and leg swelling.   Genitourinary: Negative  for dysuria.   Skin: Negative for pallor.   Neurological: Positive for facial asymmetry, weakness and headaches.   Psychiatric/Behavioral: Positive for confusion. Negative for agitation.     Objective:     Vital Signs (Most Recent):  Temp: 97.8 °F (36.6 °C) (03/24/19 1400)  Pulse: 75 (03/24/19 1419)  Resp: 19 (03/24/19 1419)  BP: (!) 152/80 (03/24/19 1419)  SpO2: 98 % (03/24/19 1419)    Vital Signs Range (Last 24H):  Temp:  [97.4 °F (36.3 °C)-99 °F (37.2 °C)]   Pulse:  [65-82]   Resp:  [16-19]   BP: (104-175)/(58-89)   SpO2:  [93 %-100 %]     Physical Exam   Constitutional: He appears well-developed and well-nourished.   HENT:   Head: Normocephalic and atraumatic.   Cardiovascular: Normal rate. An irregularly irregular rhythm present.   Pulmonary/Chest: Effort normal.   Abdominal: Soft. He exhibits distension.   Musculoskeletal: He exhibits no edema, tenderness or deformity.   Neurological: He is alert.   Skin: No rash noted. He is not diaphoretic.   Psychiatric: He has a normal mood and affect.   Vitals reviewed.      Neurological Exam:   LOC: alert  Attention Span: Good   Language: No aphasia  Orientation: Person, Place, Time   Motor: Arm left  Normal 5/5  Leg left  Normal 5/5  Arm right  Paresis: 3/5  Leg right Paresis: 3/5  Sensation: Nishant-hypoesthesia right  Tone: flaccid       Laboratory:  CMP:   Recent Labs   Lab 03/23/19  1926 03/24/19  0606   CALCIUM  --  6.8*   ALBUMIN 2.9*  --    NA  --  134*   K  --  4.8   CO2  --  22*   CL  --  101   BUN  --  100*   CREATININE  --  5.0*     CBC:   Recent Labs   Lab 03/24/19  0606   WBC 7.66  7.66   RBC 3.33*  3.33*   HGB 10.7*  10.7*   HCT 31.4*  31.4*   PLT 92*  92*   MCV 94  94   MCH 32.1*  32.1*   MCHC 34.1  34.1     Lipid Panel: No results for input(s): CHOL, LDLCALC, HDL, TRIG in the last 168 hours.  Hgb A1C:   Recent Labs   Lab 03/21/19  0456   HGBA1C 8.3*       Diagnostic Results:      Brain imaging:  MRI Brain Ischemic Inter Pro Incl MRA W/O Con    Pending report       Vessel Imaging:  CTA STROKE MULTI-PHASE   Pending report       Cardiac Evaluation:   TRANSTHORACIC ECHO (TTE) COMPLETE 3/23/2019  · Eccentric left ventricular hypertrophy.Normal left ventricular systolic function. The estimated ejection fraction is 55%  · Severe left atrial enlargement.  · Indeterminate left ventricular diastolic function.  · Moderate aortic valve stenosis. Aortic valve area is 1.37 cm2; peak velocity is 2.78 m/s; mean gradient is 22.39 mmHg. Mild aortic regurgitation.  · Mild-to-moderate mitral regurgitation.  · Mild to moderate tricuspid regurgitation.  · The estimated PA systolic pressure is 41 mm Hg  · Severe right atrial enlargement.  · Normal right ventricular systolic function. The right ventricle is mildly dilated.  · Patent foramen ovale present with left to right shunting indicated by color flow Doppler.  · Normal central venous pressure (3 mm Hg).  · Pulmonary hypertension present.

## 2019-03-24 NOTE — SUBJECTIVE & OBJECTIVE
Past Medical History:   Diagnosis Date    NICK (acute kidney injury) 7/29/2016    Allergy     Anemia, mild 12/15/2014    Arthritis     Gout    Benign essential HTN 3/27/2012    BMI 29.0-29.9,adult 5/10/2018    BPH (benign prostatic hyperplasia)     BPH (benign prostatic hyperplasia)     CAD (coronary artery disease) 2006    Chronic kidney disease     due to ibuprofen    Colon polyp     CRF (chronic renal failure), stage 5     Diverticulosis     Gastritis     GERD (gastroesophageal reflux disease)     Gout     History of colon polyps 5/3/2018    HTN (hypertension) 3/27/2012    Hyperlipidemia     Hyperlipidemia     LLL pneumonia 6/14/2018    LVH (left ventricular hypertrophy)     Mesenteric ischemia     Murmur, cardiac 3/27/2012    GRICELDA (obstructive sleep apnea)     DOES NOT USE A MACHINE    Sinus problem     Syncope and collapse      Past Surgical History:   Procedure Laterality Date    APPENDECTOMY      BLOCK-NERVE Left 5/31/2016    [facility] OR    CARDIAC CATHETERIZATION      COLONOSCOPY  2011    COLONOSCOPY N/A 5/3/2018    [facility] ENDO    COLONOSCOPY N/A 9/10/2015    [facility] at [facility] ENDO    CORONARY ARTERY BYPASS GRAFT  4/2007    x 1    CYSTOSCOPY N/A 8/30/2017    [facility] OR    CYSTOSCOPY N/A 11/10/2015    [facility] OR    ESOPHAGOGASTRODUODENOSCOPY (EGD) N/A 1/4/2016    [facility] ENDO (2ND FLR)    ESOPHAGOGASTRODUODENOSCOPY (EGD) N/A 10/15/2014    [facility] by [facility] at [facility] ENDO    INJECTION-STEROID-EPIDURAL-TRANSFORAMINAL Left 2/25/2016    [facility] OR    JOINT REPLACEMENT      left knee total replacement  X 3    mid leftt finger      from a cactuss    MOLE REMOVAL  2016    RADIOFREQUENCY THERMOCOAGULATION (RFTC)-NERVE-MEDIAN BRANCH-LUMBAR Left  4/11/2016    [facility]    rotative cuff      no rotative cuffs on bilat shoulders has pins     SHOULDER SURGERY      shoulder surgery bilat  RIGHT X 4; LEFT X 3    TRANSRECTAL ULTRASOUND GUIDED PROSTATE BIOPSY Bilateral 11/10/2015    [facility] at [facility] OR    ULTRASOUND-ENDOSCOPIC-UPPER N/A 5/31/2017    [facility] ENDO (2ND FLR)    ULTRASOUND-ENDOSCOPIC-UPPER N/A 1/4/2016    Performed by Tra [facility] at [facility] ENDO (2ND FLR)    ULTRASOUND-ENDOSCOPIC-UPPER N/A 1/16/2015    Performed by Jason Saleem MD at [facility] ENDO (2ND FLR)      No current facility-administered medications on file prior to encounter.      Current Outpatient Medications on File Prior to Encounter   Medication Sig Dispense Refill    albuterol (PROVENTIL/VENTOLIN HFA) 90 mcg/actuation inhaler 2 puffs every 4 hours as needed for cough, wheeze, or shortness of breath 3 Inhaler 3    allopurinol (ZYLOPRIM) 100 MG tablet TAKE 1 TABLET BY MOUTH EVERY DAY 90 tablet 1    aspirin (ECOTRIN) 81 MG EC tablet Take 81 mg by mouth once daily.        atorvastatin (LIPITOR) 80 MG tablet TAKE 1 TABLET (80 MG TOTAL) BY MOUTH ONCE DAILY. 90 tablet 3    budesonide-formoterol 160-4.5 mcg (SYMBICORT) 160-4.5 mcg/actuation HFAA Inhale 2 puffs into the lungs every 12 (twelve) hours. Controller 3 Inhaler 4    carvedilol (COREG) 6.25 MG tablet Take 1 tablet (6.25 mg total) by mouth 2 (two) times daily with meals. 60 tablet 3    celecoxib (CELEBREX) 200 MG capsule Take 1 capsule (200 mg total) by mouth once daily. 30 capsule 3    finasteride (PROSCAR) 5 mg tablet TAKE 1 TABLET ONCE DAILY. 90 tablet 3    gabapentin (NEURONTIN) 300 MG capsule Take 1 capsule (300 mg total) by mouth every evening. 90 capsule 3    isosorbide mononitrate (ISMO,MONOKET) 10 mg tablet TAKE 1 TABLET BY MOUTH EVERY DAY IN THE EVENING 30 tablet 3    losartan (COZAAR) 100 MG tablet TAKE 1 TABLET BY MOUTH  EVERY DAY 90 tablet 0    mirtazapine (REMERON) 7.5 MG Tab TAKE 1 TABLET BY MOUTH EVERY EVENING. 30 tablet 2    omeprazole (PRILOSEC) 20 MG capsule TAKE 1 CAPSULE BY MOUTH EVERY DAY 30 capsule 1    predniSONE (DELTASONE) 20 MG tablet 3 pills for 3 days, 2 pills for 3 days, 1 pill for 3 days, repeat if needed. 36 tablet 0    tamsulosin (FLOMAX) 0.4 mg Cap TAKE 1 CAPSULE BY MOUTH EVERY DAY 30 capsule 2    torsemide (DEMADEX) 20 MG Tab Take 1 tablet (20 mg total) by mouth 2 (two) times daily. 60 tablet 1    albuterol-ipratropium (DUO-NEB) 2.5 mg-0.5 mg/3 mL nebulizer solution Take 3 mLs by nebulization every 6 (six) hours as needed for Wheezing or Shortness of Breath. 270 mL 0    fluticasone (FLONASE) 50 mcg/actuation nasal spray 2 sprays (100 mcg total) by Each Nare route once daily. 3 Bottle 3    meclizine (ANTIVERT) 25 mg tablet TAKE 1 TABLET BY MOUTH THREE TIMES A DAY AS NEEDED 90 tablet 0    meclizine (ANTIVERT) 25 mg tablet Take 1 tablet (25 mg total) by mouth 3 (three) times daily as needed for Dizziness. 30 tablet 0    NITROSTAT 0.4 mg SL tablet PLACE 1 TABLET (0.4 MG TOTAL) UNDER THE TONGUE EVERY 5 (FIVE) MINUTES AS NEEDED FOR CHEST PAIN. 25 tablet 3    sodium chloride (SALINE NASAL MIST) 3 % Mist 1 spray by Nasal route 2 (two) times daily.      tiotropium bromide (SPIRIVA RESPIMAT) 1.25 mcg/actuation Mist Inhale 2.5 mcg into the lungs once daily. Controller 4 g 5    warfarin (COUMADIN) 7.5 MG tablet Take 1 tablet (7.5 mg total) by mouth Daily. 30 tablet 1    zolpidem (AMBIEN) 5 MG Tab TAKE 1 TABLET BY MOUTH EVERY EVENING AS NEEDED 30 tablet 3    zolpidem (AMBIEN) 5 MG Tab TAKE 1 TABLET BY MOUTH EVERY EVENING AS NEEDED 30 tablet 3      Allergies: Ace inhibitors; Arb-angiotensin receptor antagonist; Eplerenone; and Sulfa (sulfonamide antibiotics)    Family History   Problem Relation Age of Onset    Heart disease Mother     Sudden death Father     Cancer Father         advanced lung ca- found  after 2 story fall    Stroke Sister     Pneumonia Sister          from PNA    Heart disease Sister     Hypertension Brother     Kidney cancer Neg Hx     Prostate cancer Neg Hx     Urolithiasis Neg Hx     Allergic rhinitis Neg Hx     Allergies Neg Hx     Angioedema Neg Hx     Asthma Neg Hx     Atopy Neg Hx     Eczema Neg Hx     Immunodeficiency Neg Hx     Rhinitis Neg Hx     Urticaria Neg Hx      Social History     Tobacco Use    Smoking status: Never Smoker    Smokeless tobacco: Never Used   Substance Use Topics    Alcohol use: No    Drug use: No     Review of Systems   Constitutional: Positive for activity change and fatigue.   HENT: Positive for voice change. Negative for facial swelling.    Eyes: Negative for photophobia and visual disturbance.   Respiratory: Negative for shortness of breath.    Cardiovascular: Negative for chest pain.   Gastrointestinal: Negative for abdominal pain, nausea and vomiting.   Genitourinary: Negative for flank pain.   Musculoskeletal: Negative for back pain, neck pain and neck stiffness.   Neurological: Positive for facial asymmetry and weakness.   Psychiatric/Behavioral: Positive for confusion. Negative for agitation.     Objective:     Vitals:    Temp: 97.7 °F (36.5 °C)  Pulse: 74  Rhythm: atrial rhythm  BP: (!) 172/97  MAP (mmHg): 126  Resp: 13  SpO2: 98 %  O2 Device (Oxygen Therapy): room air    Temp  Min: 97.4 °F (36.3 °C)  Max: 99 °F (37.2 °C)  Pulse  Min: 65  Max: 82  BP  Min: 104/58  Max: 175/80  MAP (mmHg)  Min: 74  Max: 126  Resp  Min: 13  Max: 24  SpO2  Min: 93 %  Max: 100 %     0701 -  0700  In: 570 [P.O.:570]  Out: 1326 [Urine:1325]   Unmeasured Output  Urine Occurrence: 2       Physical Exam   Constitutional: He appears well-developed and well-nourished.   HENT:   Head: Normocephalic.   Eyes: Pupils are equal, round, and reactive to light. EOM are normal. Right eye exhibits no discharge. Left eye exhibits no discharge.   Neck: Normal  range of motion. No JVD present. No tracheal deviation present.   Cardiovascular: An irregularly irregular rhythm present.   Pulmonary/Chest: Effort normal.   Abdominal: He exhibits distension.   Musculoskeletal: Normal range of motion.   Neurological:   Pupils equal and reactive, no RAPD, normal visual field   EOM intact, no gaze preference or deviation  R side facial droop   Mild aphasia, +dysarthria  R side hemiparesis 4/5 U/L Extremities  RUE/RLE drift  Decreased tactile sensory response RUE         Unable to test coordination, gait due to level of consciousness.    Today I personally reviewed pertinent medications, lines/drains/airways, imaging, cardiology results, laboratory results, notably:

## 2019-03-24 NOTE — NURSING
Critical lab for Ca=6.8, and Patient c/o dizziness. SBP =160-170's. Paged on call IM C , awaiting call back. Patient instructed to call before getting out of bed, and bed alarm set. Will continue to monitor

## 2019-03-24 NOTE — PROGRESS NOTES
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 MD Mckeon at the bedside assessing patient. HOB flat and VSS.

## 2019-03-24 NOTE — HPI
78 years old male patient with medical history of HTN, DM-2, ESRD on HD, Proximal Afib, HLD, CAD s/p CABG 2007,asthma and HFpEF Admitted to hospital medicine on 3/22/2019 for evaluation of unstable angina.Stroke code activated and Vascular neurology consulted on 3/24/2019 AT 12:01 PM. Per patient daughter, patient called her this morning around 7 AM, wasn't sound like him self, she came to his room around 10 AM he was lethargic, disoriented and sound different but no focal weakness, by 10 PM patient developed right side weakness, facial droop, and dysarthria.MRI brain showed acute left PCA infarction, patient symptoms improved after laying flat for the MRI, transferred to neuro critical care.    patient has no previous history of stroke,no history of smoking, patient has been taking asprin, started on Heparin gtt for Afib this morning and was d/derrick due to feelings of euphoria during infusion that resolved after discontinued.

## 2019-03-24 NOTE — PLAN OF CARE
Problem: Adult Inpatient Plan of Care  Goal: Plan of Care Review  Outcome: Revised  Plan of care discussed with patient. Patient is free of fall/trauma/injury. Denies CP, SOB, pain/discomfort. All questions addressed. Will continue to monitor

## 2019-03-24 NOTE — H&P
[facility]  Neurocritical Care  History & Physical    Admit Date: 3/22/2019  Service Date: 03/24/2019  Length of Stay: 2    Subjective:     Chief Complaint: Unstable angina    History of Present Illness: Patient is a 77 yo male with PMH HTN, AFIB, CAD s/p CABG 2007, HFpEF, HLD, DM, ESRD, and Asthma transferred initially for evaluation of unstable angina until becoming symptomatic with R side weakness and dysarthria. Patient was admitted to Hospital Medicine 3/22 for evaluation of unstable angina. On 3/24 @ 1000, the patient's daughter came to visit and she noticed him lethargic, disoriented which soon progressed to include facial droop and dysarthria. A sroke code was called in response to new onset R weakness and dysarthria. MRI was completed noting Acute L PCA infarct. Per Vascular Neuro staff, the symptoms improved while supine during imaging. The patient was subsequently transferred to [facility] for further management. The patient does have a history of coumadin and ASA use. During admission he was placed on a heparin drip for Afib but it was quickly discontinued after the patient began to experience feeling of euphoria which were resolved with discontinuation of heparin gtt.              Past Medical History:   Diagnosis Date    NICK (acute kidney injury) 7/29/2016    Allergy     Anemia, mild 12/15/2014    Arthritis     Gout    Benign essential HTN 3/27/2012    BMI 29.0-29.9,adult 5/10/2018    BPH (benign prostatic hyperplasia)     BPH (benign prostatic hyperplasia)     CAD (coronary artery disease) 2006    Chronic kidney disease     due to ibuprofen    Colon polyp     CRF (chronic renal failure), stage 5     Diverticulosis     Gastritis     GERD (gastroesophageal reflux disease)     Gout     History of colon polyps 5/3/2018    HTN (hypertension) 3/27/2012    Hyperlipidemia     Hyperlipidemia     LLL pneumonia 6/14/2018    LVH (left ventricular hypertrophy)     Mesenteric  ischemia     Murmur, cardiac 3/27/2012    GRIECLDA (obstructive sleep apnea)     DOES NOT USE A MACHINE    Sinus problem     Syncope and collapse      Past Surgical History:   Procedure Laterality Date    APPENDECTOMY      BLOCK-NERVE Left 5/31/2016    Performed by Kevin Leon [facility] at [facility] OR    CARDIAC CATHETERIZATION      COLONOSCOPY  2011    COLONOSCOPY N/A 5/3/2018    Performed by Messi Harris [facility] at [facility]    COLONOSCOPY N/A 9/10/2015    [facility] at [facility]    CORONARY ARTERY BYPASS GRAFT  4/2007    x 1    CYSTOSCOPY N/A 8/30/2017    Performed by Rudy Herring [facility] at [facility] OR    CYSTOSCOPY N/A 11/10/2015    Performed by Rudy Herring MD at [facility] OR    ESOPHAGOGASTRODUODENOSCOPY (EGD) N/A 1/4/2016    Performed by Tra [facility] at [facility] (2ND FLR)    ESOPHAGOGASTRODUODENOSCOPY (EGD) N/A 10/15/2014    Performed by Messi Harris MD at [facility] ENDO    INJECTION-STEROID-EPIDURAL-TRANSFORAMINAL Left 2/25/2016    [facility] by [facility] at [facility] OR    JOINT REPLACEMENT      left knee total replacement  X 3    mid leftt finger      from a cactuss    MOLE REMOVAL  2016    RADIOFREQUENCY THERMOCOAGULATION (RFTC)-NERVE-MEDIAN BRANCH-LUMBAR Left 4/11/2016    Performed by Kevin Leon [facility] at [facility] OR    rotative cuff      no rotative cuffs on bilat shoulders has pins     SHOULDER SURGERY      shoulder surgery bilat  RIGHT X 4; LEFT X 3    TRANSRECTAL ULTRASOUND GUIDED PROSTATE BIOPSY Bilateral 11/10/2015    Performed by Rudy Herring MD at [facility] OR    ULTRASOUND-ENDOSCOPIC-UPPER N/A 5/31/2017    Performed by Tra Brooks [facility] at [facility] ENDO (2ND FLR)    ULTRASOUND-ENDOSCOPIC-UPPER N/A 1/4/2016    Performed by Tra Brooks MD at [facility] ENDO (2ND FLR)    ULTRASOUND-ENDOSCOPIC-UPPER N/A 1/16/2015    Performed by Jason Saleem MD at [facility] ENDO (2ND FLR)      No current facility-administered medications on file prior to encounter.      Current Outpatient  Medications on File Prior to Encounter   Medication Sig Dispense Refill    albuterol (PROVENTIL/VENTOLIN HFA) 90 mcg/actuation inhaler 2 puffs every 4 hours as needed for cough, wheeze, or shortness of breath 3 Inhaler 3    allopurinol (ZYLOPRIM) 100 MG tablet TAKE 1 TABLET BY MOUTH EVERY DAY 90 tablet 1    aspirin (ECOTRIN) 81 MG EC tablet Take 81 mg by mouth once daily.        atorvastatin (LIPITOR) 80 MG tablet TAKE 1 TABLET (80 MG TOTAL) BY MOUTH ONCE DAILY. 90 tablet 3    budesonide-formoterol 160-4.5 mcg (SYMBICORT) 160-4.5 mcg/actuation HFAA Inhale 2 puffs into the lungs every 12 (twelve) hours. Controller 3 Inhaler 4    carvedilol (COREG) 6.25 MG tablet Take 1 tablet (6.25 mg total) by mouth 2 (two) times daily with meals. 60 tablet 3    celecoxib (CELEBREX) 200 MG capsule Take 1 capsule (200 mg total) by mouth once daily. 30 capsule 3    finasteride (PROSCAR) 5 mg tablet TAKE 1 TABLET ONCE DAILY. 90 tablet 3    gabapentin (NEURONTIN) 300 MG capsule Take 1 capsule (300 mg total) by mouth every evening. 90 capsule 3    isosorbide mononitrate (ISMO,MONOKET) 10 mg tablet TAKE 1 TABLET BY MOUTH EVERY DAY IN THE EVENING 30 tablet 3    losartan (COZAAR) 100 MG tablet TAKE 1 TABLET BY MOUTH EVERY DAY 90 tablet 0    mirtazapine (REMERON) 7.5 MG Tab TAKE 1 TABLET BY MOUTH EVERY EVENING. 30 tablet 2    omeprazole (PRILOSEC) 20 MG capsule TAKE 1 CAPSULE BY MOUTH EVERY DAY 30 capsule 1    predniSONE (DELTASONE) 20 MG tablet 3 pills for 3 days, 2 pills for 3 days, 1 pill for 3 days, repeat if needed. 36 tablet 0    tamsulosin (FLOMAX) 0.4 mg Cap TAKE 1 CAPSULE BY MOUTH EVERY DAY 30 capsule 2    torsemide (DEMADEX) 20 MG Tab Take 1 tablet (20 mg total) by mouth 2 (two) times daily. 60 tablet 1    albuterol-ipratropium (DUO-NEB) 2.5 mg-0.5 mg/3 mL nebulizer solution Take 3 mLs by nebulization every 6 (six) hours as needed for Wheezing or Shortness of Breath. 270 mL 0    fluticasone (FLONASE) 50  mcg/actuation nasal spray 2 sprays (100 mcg total) by Each Nare route once daily. 3 Bottle 3    meclizine (ANTIVERT) 25 mg tablet TAKE 1 TABLET BY MOUTH THREE TIMES A DAY AS NEEDED 90 tablet 0    meclizine (ANTIVERT) 25 mg tablet Take 1 tablet (25 mg total) by mouth 3 (three) times daily as needed for Dizziness. 30 tablet 0    NITROSTAT 0.4 mg SL tablet PLACE 1 TABLET (0.4 MG TOTAL) UNDER THE TONGUE EVERY 5 (FIVE) MINUTES AS NEEDED FOR CHEST PAIN. 25 tablet 3    sodium chloride (SALINE NASAL MIST) 3 % Mist 1 spray by Nasal route 2 (two) times daily.      tiotropium bromide (SPIRIVA RESPIMAT) 1.25 mcg/actuation Mist Inhale 2.5 mcg into the lungs once daily. Controller 4 g 5    warfarin (COUMADIN) 7.5 MG tablet Take 1 tablet (7.5 mg total) by mouth Daily. 30 tablet 1    zolpidem (AMBIEN) 5 MG Tab TAKE 1 TABLET BY MOUTH EVERY EVENING AS NEEDED 30 tablet 3    zolpidem (AMBIEN) 5 MG Tab TAKE 1 TABLET BY MOUTH EVERY EVENING AS NEEDED 30 tablet 3      Allergies: Ace inhibitors; Arb-angiotensin receptor antagonist; Eplerenone; and Sulfa (sulfonamide antibiotics)    Family History   Problem Relation Age of Onset    Heart disease Mother     Sudden death Father     Cancer Father         advanced lung ca- found after 2 story fall    Stroke Sister     Pneumonia Sister          from PNA    Heart disease Sister     Hypertension Brother     Kidney cancer Neg Hx     Prostate cancer Neg Hx     Urolithiasis Neg Hx     Allergic rhinitis Neg Hx     Allergies Neg Hx     Angioedema Neg Hx     Asthma Neg Hx     Atopy Neg Hx     Eczema Neg Hx     Immunodeficiency Neg Hx     Rhinitis Neg Hx     Urticaria Neg Hx      Social History     Tobacco Use    Smoking status: Never Smoker    Smokeless tobacco: Never Used   Substance Use Topics    Alcohol use: No    Drug use: No     Review of Systems   Constitutional: Positive for activity change and fatigue.   HENT: Positive for voice change. Negative for facial  swelling.    Eyes: Negative for photophobia and visual disturbance.   Respiratory: Negative for shortness of breath.    Cardiovascular: Negative for chest pain.   Gastrointestinal: Negative for abdominal pain, nausea and vomiting.   Genitourinary: Negative for flank pain.   Musculoskeletal: Negative for back pain, neck pain and neck stiffness.   Neurological: Positive for facial asymmetry and weakness.   Psychiatric/Behavioral: Positive for confusion. Negative for agitation.     Objective:     Vitals:    Temp: 97.7 °F (36.5 °C)  Pulse: 74  Rhythm: atrial rhythm  BP: (!) 172/97  MAP (mmHg): 126  Resp: 13  SpO2: 98 %  O2 Device (Oxygen Therapy): room air    Temp  Min: 97.4 °F (36.3 °C)  Max: 99 °F (37.2 °C)  Pulse  Min: 65  Max: 82  BP  Min: 104/58  Max: 175/80  MAP (mmHg)  Min: 74  Max: 126  Resp  Min: 13  Max: 24  SpO2  Min: 93 %  Max: 100 %    03/23 0701 - 03/24 0700  In: 570 [P.O.:570]  Out: 1326 [Urine:1325]   Unmeasured Output  Urine Occurrence: 2       Physical Exam   Constitutional: He appears well-developed and well-nourished.   HENT:   Head: Normocephalic.   Eyes: Pupils are equal, round, and reactive to light. EOM are normal. Right eye exhibits no discharge. Left eye exhibits no discharge.   Neck: Normal range of motion. No JVD present. No tracheal deviation present.   Cardiovascular: An irregularly irregular rhythm present.   Pulmonary/Chest: Effort normal.   Abdominal: He exhibits distension.   Musculoskeletal: Normal range of motion.   Neurological:   Pupils equal and reactive, no RAPD, normal visual field   EOM intact, no gaze preference or deviation  R side facial droop   Mild aphasia, +dysarthria  R side hemiparesis 4/5 U/L Extremities  RUE/RLE drift  Decreased tactile sensory response RUE         Unable to test coordination, gait due to level of consciousness.    Today I personally reviewed pertinent medications, lines/drains/airways, imaging, cardiology results, laboratory results,  notably:        Assessment/Plan:     Neuro  Acute ischemic left PCA stroke  Admit to NCC; No LVO; No tPA  Vascular Neurology Consult  CTA  MRI  Maintain -220  HOB flat  NPO      Pulmonary  Severe persistent asthma with exacerbation  Monitor Oxygen  Duoneb Treatments  Azithromyicin    Cardiac/Vascular  * Unstable angina  Cardiology consult  Cardiac Monitoring  Monitor Cardiac Enzymes  EKG  ECHO  NTG SL    Paroxysmal atrial fibrillation  EKG  Continue Carvedilol        CAD (coronary artery disease), 2V CABG 2007  Hx CAD/ CABG 2007  Continue ASA  Continue statin      Hyperlipidemia, baseline   Monitor Lipid Panel    Essential hypertension  Permissive HTN  Maintain -220  Keep patient flat  EKG  ECHO  Cardiac Monitoring  Continue Carvedilol  Continue Losartan  Continue Toresemide    Renal/  ESRD (end stage renal disease) on dialysis  Nephrology Consult  Hemodialysis MWF  Monitor Renal Panel  Adjust meds and treatments to renal function    Benign prostatic hyperplasia without lower urinary tract symptoms  Continue Finasteride  Continue Tamulosin    Hematology  Long term (current) use of anticoagulants  Home use of Coumadin  Started on Heparin but discontinued d/t adverse reaction  Monitor INR Daily/PRN      Oncology  Anemia of chronic disease  Monitor CBC Daily/PRN    Endocrine  Type 2 diabetes mellitus, without long-term current use of insulin  Hbg A1c 8.3  Nutritional Consult  Accucheck Q4  RISS    Orthopedic  Gout, arthritis  Continue Allopurinol    Other  SOB (shortness of breath)  Continuous Oxygen  ABG PRN  CXR          The patient is being Prophylaxed for:  Venous Thromboembolism with: Chemical  Stress Ulcer with: PPI  Ventilator Pneumonia with: none    Activity Orders          Diet NPO: NPO starting at 03/25 0001    Diet NPO: NPO starting at 03/24 1801        Full Code     Critical Care Time 35min    Greg Benson NP  Neurocritical Care  [facility]

## 2019-03-24 NOTE — HOSPITAL COURSE
Admitted to hospital medicine for unstable angina eval, vascular neurology consulted for right sided weakness, facial droop and dysarthria, MRI showed L PCA infarction, not candidate for TPA ( symptoms duration >4 hours)patient symptoms improved with laying flat, admitted to neuro critical care. Patient with known PCA infarct on L with undulating symptom pattern with modification to level of recumbency. Patient restarted on heparin gtt. Underwent US of LE and of carotid with evidence of <50% L ICA stenosis and 60-70% R ICA stenosis, heavily calcified on CTA, additionally L vertebral also heavily calcified. Patient tolerated HD. Neuro deficit appear to be more severe on 3/27 with decreasing right arm strength now 2/5 and with worsening dysarthria/aphasia. Patient with gross swelling of R forearm - tense, pulses intact.   03/29/2019 patient with worsening of neurological deficit, unable to move right upper extremity, patient with pain on passive movement of right fingers exhibited by facial grimace as verbal response to pain/sensation/light touch no longer reliable, aphasia/dysarthria worsening, RLE with 2/5 strength today, patient evaluated by orthopedic surgery regarding R forearm yesterday and today and feel that this is not compartment syndrome and will continue to monitor, distal pulses remain palpable  3/30: NAEON. On heparin gtt. Plans to step down  3/31: stepped down to VN without major events. transfused with pRBC x1 due to drop in Hbg. Discontinued heparin gtt. INR increased to 1.2, increased warfarin to 7.5  04/01/19 Visited patient in dialysis today.  On coumadin and atorvastatin for secondary stroke prevention H/H stabilized at 7.6 after 1 unit pRBC transfusion on 03/31.  Epo given during dialysis.  Patient has worsening right groin hematoma.  Stat right femoral artery u/s ordered.  On clindamycin for suspected right arm cellulitis.  Sister-in-law concern for urination.  Patient was incontinent and making  "urine but today has not had any urination.  U/A and bladder scan ordered   04/02/19 hospital medicine consulted for multiple medical issues. Right arm edema worsening.  Switching antibiotics from oral to IV     4/3/19 - drowsy today, less interactive. Plan for dialysis today. Noted to have gross hematuria with clots , bladder scan 315.   4/4/19 - will place condom catheter for hematuria monitoring, H&H stable. Hospital medicine helping with cellulitis. cpap ordered for night  4/5/19 - dialysis today, agitated overnight and throughout the day. Appears angry telling his family today "leave me alone, go home". Will plan for telesitter, pharmacologic therapy ordered prn tonight, with plans to involve psych if not able to control as at this time its causing him to be less interactive with therapy   04/06 Patient agitation improved with Seroquel and ramelteon.  INR increased to 2.8.  Coumadin decreased from 7.5mg to 5mg qd.  Will continue to monitor daily.  Edema in left arm improving, will continue doxycycline for cellulitis   04/07/19 Right arm edema and redness improving.  Last dose of doxycycline will be 04/09.  INR 2.5.  Patient stable for discharge   04/08/19: No acute events overnight. Patient medically ready for discharge  04/09/19: Plan for discharge to rehab today  "

## 2019-03-24 NOTE — ASSESSMENT & PLAN NOTE
Lab Results   Component Value Date    HGBA1C 8.3 (H) 03/21/2019     Stoke risk factor   managed by primary team

## 2019-03-24 NOTE — ASSESSMENT & PLAN NOTE
Home use of Coumadin  Started on Heparin but discontinued d/t adverse reaction  Monitor INR Daily/PRN

## 2019-03-24 NOTE — H&P
[facility]  Vascular Neurology  Comprehensive Stroke Center  History & Physical    Inpatient consult to Vascular (Stroke) Neurology  Consult performed by: DANIAL Bailey  Consult ordered by: DANIAL Bailey        Assessment/Plan:     Patient is a 78 y.o. year old male with:    Paroxysmal atrial fibrillation  chadsvasc 6  stroke risk factor   on coreg for rate control   Need to discuss risk and benefit of anticoagulation and start prior to discharge     Acute ischemic left PCA stroke  Likely embolic, cardiac given his history of Afib and CAD    Patient is not a TPA candidate had symptoms for more than 4.5 hours which was confirmed by present of flare on MRI scan.  Symptoms improved with laying flat, likely flow depended    - keep patient flat     Antithrombotics for secondary stroke prevention: Antiplatelets: Aspirin: 81 mg daily    Statins for secondary stroke prevention and hyperlipidemia, if present:   Statins: Atorvastatin- 40 mg daily    Aggressive risk factor modification: HTN, DM, HLD, A-Fib, CAD     Rehab efforts: PT/OT/SLP to evaluate and treat, PM&R consult     Diagnostics ordered/pending: Carotid ultrasound to assess vasculature    VTE prophylaxis: Heparin 5000 units SQ every 8 hours    BP parameters: Infarct: No intervention, SBP <220        ESRD (end stage renal disease) on dialysis  Continue scheduled HD     Type 2 diabetes mellitus, without long-term current use of insulin  Lab Results   Component Value Date    HGBA1C 8.3 (H) 03/21/2019     Stoke risk factor   managed by primary team     CAD (coronary artery disease), 2V CABG 2007  Stoke risk factor   managed by primary team     Hyperlipidemia, baseline   Lab Results   Component Value Date    LDLCALC 46.4 (L) 07/26/2018   Stoke risk factor   Continue atorvastatin 40 mg       Essential hypertension  Maintain SBP>220      STROKE DOCUMENTATION          NIH Scale:  1a. Level of Consciousness: 0-->Alert, keenly  responsive  1b. LOC Questions: 0-->Answers both questions correctly  1c. LOC Commands: 0-->Performs both tasks correctly  2. Best Gaze: 0-->Normal  3. Visual: 0-->No visual loss  4. Facial Palsy: 1-->Minor paralysis (flattened nasolabial fold, asymmetry on smiling)  5a. Motor Arm, Left: 0-->No drift, limb holds 90 (or 45) degrees for full 10 secs  5b. Motor Arm, Right: 2-->Some effort against gravity, limb cannot get to or maintain (if cued) 90 (or 45) degrees, drifts down to bed, but has some effort against gravity  6a. Motor Leg, Left: 0-->No drift, leg holds 30 degree position for full 5 secs  6b. Motor Leg, Right: 2-->Some effort against gravity, leg falls to bed by 5 secs, but has some effort against gravity  7. Limb Ataxia: 0-->Absent  8. Sensory: 1-->Mild-to-moderate sensory loss, patient feels pinprick is less sharp or is dull on the affected side, or there is a loss of superficial pain with pinprick, but patient is aware of being touched  9. Best Language: 1-->Mild-to-moderate aphasia, some obvious loss of fluency or facility of comprehension, without significant limitation on ideas expressed or form of expression. Reduction of speech and/or comprehension, however, makes conversation. . . (see row details)  10. Dysarthria: 1-->Mild-to-moderate dysarthria, patient slurs at least some words and, at worst, can be understood with some difficulty  11. Extinction and Inattention (formerly Neglect): 0-->No abnormality  Total (NIH Stroke Scale): 8     Modified [place]    Everett Coma Scale:    ABCD2 Score:    [id number] Score:   HAS -BLED Score:   ICH Score:   Hunt & Sharp Classification:      Thrombolysis Candidate? No, Out of window       Interventional Revascularization Candidate?   Is the patient eligible for mechanical endovascular reperfusion (BLADE)?  No; No significant neurological deficit    Hemorrhagic change of an Ischemic Stroke: Does this patient have an ischemic stroke with hemorrhagic changes? No          Subjective:     History of Present Illness:  78 years old male patient with medical history of HTN, DM-2, ESRD on HD, Proximal Afib, HLD, CAD s/p CABG 2007,asthma and HFpEF Admitted to hospital medicine on 3/22/2019 for evaluation of unstable angina.Stroke code activated and Vascular neurology consulted on 3/24/2019 AT 12:01 PM. Per patient daughter, patient called her this morning around 7 AM, wasn't sound like him self, she came to his room around 10 AM he was lethargic, disoriented and sound different but no focal weakness, by 10 PM patient developed right side weakness, facial droop, and dysarthria.MRI brain showed acute left PCA infarction, patient symptoms improved after laying flat for the MRI, transferred to neuro critical care.    patient has no previous history of stroke,no history of smoking, patient has been taking asprin, started on Heparin gtt for Afib this morning and was d/derrick due to feelings of euphoria during infusion that resolved after discontinued.          Past Medical History:   Diagnosis Date    NICK (acute kidney injury) 7/29/2016    Allergy     Anemia, mild 12/15/2014    Arthritis     Gout    Benign essential HTN 3/27/2012    BMI 29.0-29.9,adult 5/10/2018    BPH (benign prostatic hyperplasia)     BPH (benign prostatic hyperplasia)     CAD (coronary artery disease) 2006    Chronic kidney disease     due to ibuprofen    Colon polyp     CRF (chronic renal failure), stage 5     Diverticulosis     Gastritis     GERD (gastroesophageal reflux disease)     Gout     History of colon polyps 5/3/2018    HTN (hypertension) 3/27/2012    Hyperlipidemia     Hyperlipidemia     LLL pneumonia 6/14/2018    LVH (left ventricular hypertrophy)     Mesenteric ischemia     Murmur, cardiac 3/27/2012    GRICELDA (obstructive sleep apnea)     DOES NOT USE A MACHINE    Sinus problem     Syncope and collapse      Past Surgical History:   Procedure Laterality Date    APPENDECTOMY       BLOCK-NERVE Left 2016    [facility]    CARDIAC CATHETERIZATION      COLONOSCOPY      COLONOSCOPY N/A 5/3/2018    [facility]    COLONOSCOPY N/A 9/10/2015    [facility] at [facility]    CORONARY ARTERY BYPASS GRAFT  4/2007    x 1    CYSTOSCOPY N/A 2017    [facility] at [facility]    CYSTOSCOPY N/A 11/10/2015    [facility] at [facility]    ESOPHAGOGASTRODUODENOSCOPY (EGD) N/A 2016    Performed by Tra [facility] at [facility] (2ND FLR)    ESOPHAGOGASTRODUODENOSCOPY (EGD) N/A 10/15/2014    Performed [facility] at [facility]    INJECTION-STEROID-EPIDURAL-TRANSFORAMINAL Left 2016    [facility]    JOINT REPLACEMENT      left knee total replacement  X 3    mid leftt finger      from a cactuss    MOLE REMOVAL      RADIOFREQUENCY THERMOCOAGULATION (RFTC)-NERVE-MEDIAN BRANCH-LUMBAR Left 2016    [facility]    rotative cuff      no rotative cuffs on bilat shoulders has pins     SHOULDER SURGERY      shoulder surgery bilat  RIGHT X 4; LEFT X 3    TRANSRECTAL ULTRASOUND GUIDED PROSTATE BIOPSY Bilateral 11/10/2015    [facility] at [facility] OR    ULTRASOUND-ENDOSCOPIC-UPPER N/A 2017    Performed [facility] at [facility] (2ND FLR)    ULTRASOUND-ENDOSCOPIC-UPPER N/A 2016    Performed by Tra [facility] at [facility] END[facility] (2ND FLR)    ULTRASOUND-ENDOSCOPIC-UPPER N/A 2015    Performed by Jason Saleem [facility] at [facility] END[facility] (2ND FLR)     Family History   Problem Relation Age of Onset    Heart disease Mother     Sudden death Father     Cancer Father         advanced lung ca- found after 2 story fall    Stroke Sister     Pneumonia Sister          from PNA    Heart disease Sister     Hypertension Brother     Kidney cancer Neg Hx     Prostate cancer  Neg Hx     Urolithiasis Neg Hx     Allergic rhinitis Neg Hx     Allergies Neg Hx     Angioedema Neg Hx     Asthma Neg Hx     Atopy Neg Hx     Eczema Neg Hx     Immunodeficiency Neg Hx     Rhinitis Neg Hx     Urticaria Neg Hx      Social History     Tobacco Use    Smoking status: Never Smoker    Smokeless tobacco: Never Used   Substance Use Topics    Alcohol use: No    Drug use: No     Review of patient's allergies indicates:   Allergen Reactions    Ace inhibitors Other (See Comments)     Cough    Arb-angiotensin receptor antagonist Itching    Eplerenone Other (See Comments)     Marked bradycardia, 40, tiredness and weakness      Sulfa (sulfonamide antibiotics) Itching     Patient says this was 10 years ago and doesn't remember what happened       Medications: I have reviewed the current medication administration record.    Facility-Administered Medications Prior to Admission   Medication Dose Route Frequency Provider Last Rate Last Dose    albuterol nebulizer solution 1.25 mg  1.25 mg Nebulization Q6H PRN Telma Powers NP   1.25 mg at 02/26/19 1134     Medications Prior to Admission   Medication Sig Dispense Refill Last Dose    albuterol (PROVENTIL/VENTOLIN HFA) 90 mcg/actuation inhaler 2 puffs every 4 hours as needed for cough, wheeze, or shortness of breath 3 Inhaler 3 3/22/2019 at 0400    allopurinol (ZYLOPRIM) 100 MG tablet TAKE 1 TABLET BY MOUTH EVERY DAY 90 tablet 1 3/22/2019 at 0900    aspirin (ECOTRIN) 81 MG EC tablet Take 81 mg by mouth once daily.     3/22/2019 at 100    atorvastatin (LIPITOR) 80 MG tablet TAKE 1 TABLET (80 MG TOTAL) BY MOUTH ONCE DAILY. 90 tablet 3 3/22/2019 at 1000    budesonide-formoterol 160-4.5 mcg (SYMBICORT) 160-4.5 mcg/actuation HFAA Inhale 2 puffs into the lungs every 12 (twelve) hours. Controller 3 Inhaler 4 3/22/2019 at Unknown time    carvedilol (COREG) 6.25 MG tablet Take 1 tablet (6.25 mg total) by mouth 2 (two) times daily with meals. 60 tablet 3  3/22/2019 at Unknown time    celecoxib (CELEBREX) 200 MG capsule Take 1 capsule (200 mg total) by mouth once daily. 30 capsule 3 3/22/2019 at Unknown time    finasteride (PROSCAR) 5 mg tablet TAKE 1 TABLET ONCE DAILY. 90 tablet 3 3/22/2019 at Unknown time    gabapentin (NEURONTIN) 300 MG capsule Take 1 capsule (300 mg total) by mouth every evening. 90 capsule 3 3/22/2019 at Unknown time    isosorbide mononitrate (ISMO,MONOKET) 10 mg tablet TAKE 1 TABLET BY MOUTH EVERY DAY IN THE EVENING 30 tablet 3 3/22/2019 at Unknown time    losartan (COZAAR) 100 MG tablet TAKE 1 TABLET BY MOUTH EVERY DAY 90 tablet 0 3/22/2019 at Unknown time    mirtazapine (REMERON) 7.5 MG Tab TAKE 1 TABLET BY MOUTH EVERY EVENING. 30 tablet 2 3/21/2019 at Unknown time    omeprazole (PRILOSEC) 20 MG capsule TAKE 1 CAPSULE BY MOUTH EVERY DAY 30 capsule 1 3/22/2019 at Unknown time    predniSONE (DELTASONE) 20 MG tablet 3 pills for 3 days, 2 pills for 3 days, 1 pill for 3 days, repeat if needed. 36 tablet 0 3/22/2019 at Unknown time    tamsulosin (FLOMAX) 0.4 mg Cap TAKE 1 CAPSULE BY MOUTH EVERY DAY 30 capsule 2 3/22/2019 at Unknown time    torsemide (DEMADEX) 20 MG Tab Take 1 tablet (20 mg total) by mouth 2 (two) times daily. 60 tablet 1 3/22/2019 at Unknown time    albuterol-ipratropium (DUO-NEB) 2.5 mg-0.5 mg/3 mL nebulizer solution Take 3 mLs by nebulization every 6 (six) hours as needed for Wheezing or Shortness of Breath. 270 mL 0 3/10/2019 at Unknown time    fluticasone (FLONASE) 50 mcg/actuation nasal spray 2 sprays (100 mcg total) by Each Nare route once daily. 3 Bottle 3 3/10/2019 at Unknown time    meclizine (ANTIVERT) 25 mg tablet TAKE 1 TABLET BY MOUTH THREE TIMES A DAY AS NEEDED 90 tablet 0 Past Week at Unknown time    meclizine (ANTIVERT) 25 mg tablet Take 1 tablet (25 mg total) by mouth 3 (three) times daily as needed for Dizziness. 30 tablet 0 Past Week at Unknown time    NITROSTAT 0.4 mg SL tablet PLACE 1 TABLET (0.4  MG TOTAL) UNDER THE TONGUE EVERY 5 (FIVE) MINUTES AS NEEDED FOR CHEST PAIN. 25 tablet 3 Unknown at Unknown time    sodium chloride (SALINE NASAL MIST) 3 % Mist 1 spray by Nasal route 2 (two) times daily.   Past Month at Unknown time    tiotropium bromide (SPIRIVA RESPIMAT) 1.25 mcg/actuation Mist Inhale 2.5 mcg into the lungs once daily. Controller 4 g 5 Unknown at Unknown time    warfarin (COUMADIN) 7.5 MG tablet Take 1 tablet (7.5 mg total) by mouth Daily. 30 tablet 1 Unknown at Unknown time    zolpidem (AMBIEN) 5 MG Tab TAKE 1 TABLET BY MOUTH EVERY EVENING AS NEEDED 30 tablet 3 Past Week at Unknown time    zolpidem (AMBIEN) 5 MG Tab TAKE 1 TABLET BY MOUTH EVERY EVENING AS NEEDED 30 tablet 3 Past Week at Unknown time       Review of Systems   Constitutional: Positive for activity change and fatigue. Negative for chills.   Respiratory: Positive for cough. Negative for wheezing.    Cardiovascular: Negative for palpitations and leg swelling.   Genitourinary: Negative for dysuria.   Skin: Negative for pallor.   Neurological: Positive for facial asymmetry, weakness and headaches.   Psychiatric/Behavioral: Positive for confusion. Negative for agitation.     Objective:     Vital Signs (Most Recent):  Temp: 97.8 °F (36.6 °C) (03/24/19 1400)  Pulse: 75 (03/24/19 1419)  Resp: 19 (03/24/19 1419)  BP: (!) 152/80 (03/24/19 1419)  SpO2: 98 % (03/24/19 1419)    Vital Signs Range (Last 24H):  Temp:  [97.4 °F (36.3 °C)-99 °F (37.2 °C)]   Pulse:  [65-82]   Resp:  [16-19]   BP: (104-175)/(58-89)   SpO2:  [93 %-100 %]     Physical Exam   Constitutional: He appears well-developed and well-nourished.   HENT:   Head: Normocephalic and atraumatic.   Cardiovascular: Normal rate. An irregularly irregular rhythm present.   Pulmonary/Chest: Effort normal.   Abdominal: Soft. He exhibits distension.   Musculoskeletal: He exhibits no edema, tenderness or deformity.   Neurological: He is alert.   Skin: No rash noted. He is not diaphoretic.    Psychiatric: He has a normal mood and affect.   Vitals reviewed.      Neurological Exam:   LOC: alert  Attention Span: Good   Language: No aphasia  Orientation: Person, Place, Time   Motor: Arm left  Normal 5/5  Leg left  Normal 5/5  Arm right  Paresis: 3/5  Leg right Paresis: 3/5  Sensation: Nishant-hypoesthesia right  Tone: flaccid       Laboratory:  CMP:   Recent Labs   Lab 03/23/19  1926 03/24/19  0606   CALCIUM  --  6.8*   ALBUMIN 2.9*  --    NA  --  134*   K  --  4.8   CO2  --  22*   CL  --  101   BUN  --  100*   CREATININE  --  5.0*     CBC:   Recent Labs   Lab 03/24/19  0606   WBC 7.66  7.66   RBC 3.33*  3.33*   HGB 10.7*  10.7*   HCT 31.4*  31.4*   PLT 92*  92*   MCV 94  94   MCH 32.1*  32.1*   MCHC 34.1  34.1     Lipid Panel: No results for input(s): CHOL, LDLCALC, HDL, TRIG in the last 168 hours.  Hgb A1C:   Recent Labs   Lab 03/21/19  0456   HGBA1C 8.3*       Diagnostic Results:      Brain imaging:  MRI Brain Ischemic Inter Pro Incl MRA W/O Con   Pending report       Vessel Imaging:  CTA STROKE MULTI-PHASE   Pending report       Cardiac Evaluation:   TRANSTHORACIC ECHO (TTE) COMPLETE 3/23/2019  · Eccentric left ventricular hypertrophy.Normal left ventricular systolic function. The estimated ejection fraction is 55%  · Severe left atrial enlargement.  · Indeterminate left ventricular diastolic function.  · Moderate aortic valve stenosis. Aortic valve area is 1.37 cm2; peak velocity is 2.78 m/s; mean gradient is 22.39 mmHg. Mild aortic regurgitation.  · Mild-to-moderate mitral regurgitation.  · Mild to moderate tricuspid regurgitation.  · The estimated PA systolic pressure is 41 mm Hg  · Severe right atrial enlargement.  · Normal right ventricular systolic function. The right ventricle is mildly dilated.  · Patent foramen ovale present with left to right shunting indicated by color flow Doppler.  · Normal central venous pressure (3 mm Hg).  · Pulmonary hypertension present.        Ekangelica Barros,  DANIAL  Comprehensive Stroke Center  Department of Vascular Neurology   [facility]

## 2019-03-24 NOTE — PT/OT/SLP PROGRESS
Occupational Therapy  Not Seen/Hold      Patient Name:  Micheal Hunt   MRN:  [id number]    Patient with Stroke Code called this AM following R UE weakness. Pt ESTELA for testing (MRI and CT) per vascular neurology.   OT to HOLD on this date. Will follow up at later time for evaluation as appropriate.    DIANE Olmos  3/24/2019

## 2019-03-24 NOTE — ASSESSMENT & PLAN NOTE
Lab Results   Component Value Date    LDLCALC 46.4 (L) 07/26/2018   Stoke risk factor   Continue atorvastatin 40 mg

## 2019-03-24 NOTE — HPI
Patient is a 79 yo male with PMH HTN, AFIB, CAD s/p CABG 2007, HFpEF, HLD, DM, ESRD, and Asthma transferred initially for evaluation of unstable angina until becoming symptomatic with R side weakness and dysarthria. Patient was admitted to Hospital Medicine 3/22 for evaluation of unstable angina. On 3/24 @ 1000, the patient's daughter came to visit and she noticed him lethargic, disoriented which soon progressed to include facial droop and dysarthria. A sroke code was called in response to new onset R weakness and dysarthria. MRI was completed noting Acute L PCA infarct. Per Vascular Neuro staff, the symptoms improved while supine during imaging. The patient was subsequently transferred to [facility] for further management. The patient does have a history of coumadin and ASA use. During admission he was placed on a heparin drip for Afib but it was quickly discontinued after the patient began to experience feeling of euphoria which were resolved with discontinuation of heparin gtt.

## 2019-03-24 NOTE — NURSING
Time Stroke Code initiated: 1200  Stroke team Arrival time: 1205  Stroke Code activation triggers: acute RSW, dysarthria, sensory deficits on R side   Vascular Neurologist you spoke with: Dr Sylvain MD    Blood sugar 142    Arrived at CT: 1225  CT completed: 1240  Dr Sylvain MD and Dr Fiorella MD with vascular neurology accompanied pt to CT.     VAN positive or negative: positive     tPA decision: not a candidate per Vascular Neurology Sylvain MITCHELL  tPA bolus (mg and time): n/a  tPA infusion (mg and time): b/a  tPA end time: n/a    Pt transported to MRI on continuous monitoring and room air by RNx3 for acute phase scan at 1245.     IR decision: not a candidate   IR arrival: n/a  IR end time: n/a    Pt transferred to: 347 @ 1325 --> pt transferred to room 9090 @ 1350  Report given to: ANTONIO Aguilar and ANTONIO Hollis    Pt's daughter Tonya notified of new room number.

## 2019-03-24 NOTE — PROCEDURES
"Micheal Hunt is a 78 y.o. male patient.    Temp: 97.7 °F (36.5 °C) (19 1500)  Pulse: 75 (19 1500)  Resp: (!) 24 (19 1500)  BP: (!) 172/97 (19 1500)  SpO2: 97 % (19 1500)  Weight: 106.6 kg (235 lb 0.2 oz) (19 0600)  Height: 6' 1" (185.4 cm) (19 0823)       Arterial Line  Date/Time: 3/24/2019 4:21 PM  Location procedure was performed: [facility] NEURO CRITICAL CARE  Performed by: Maritza Wilkins NP  Authorized by: Maritza Wilkins NP   Consent Done: Yes  Consent: Written consent obtained.  Risks and benefits: risks, benefits and alternatives were discussed  Consent given by: daughter.  Required items: required blood products, implants, devices, and special equipment available  Patient identity confirmed: , MRN and name  Time out: Immediately prior to procedure a "time out" was called to verify the correct patient, procedure, equipment, support staff and site/side marked as required.  Preparation: Patient was prepped and draped in the usual sterile fashion.  Indications: multiple ABGs and hemodynamic monitoring  Location: right radial  Patient sedated: no  Tigre's test normal: yes  Needle gauge: 20  Seldinger technique: Seldinger technique used  Number of attempts: 1  Complications: No  Specimens: No  Implants: No  Post-procedure: dressing applied  Patient tolerance: Patient tolerated the procedure well with no immediate complications          Maritza Wilkins  3/24/2019  "

## 2019-03-24 NOTE — ASSESSMENT & PLAN NOTE
Admit to [facility]; No LVO; No tPA  Vascular Neurology Consult  CTA  MRI  Maintain -220  HOB flat  NPO

## 2019-03-24 NOTE — NURSING TRANSFER
Nursing Transfer Note      3/24/2019     Transfer From: CT/MRI brain    Transfer via bed    Transfer with  to O2, cardiac monitoring    Transported by Stroke team

## 2019-03-24 NOTE — PLAN OF CARE
Problem: Adult Inpatient Plan of Care  Goal: Plan of Care Review  Outcome: Revised  Pt free of falls/trauma/injuries.  Denies c/o SOB, CP. Pt c/o back pain being treated with PO analgesics.  Generalized skin remains CDI; Mild edema noted.  Pt being diuresed with PO Torsemide; diuresing well.   Wt decreased since yesterday.   Explained and re-enforced fluid restriction and need for FR with pt.  Pt tolerating plan of care.

## 2019-03-25 PROBLEM — R06.2 WHEEZING: Status: ACTIVE | Noted: 2019-03-25

## 2019-03-25 PROBLEM — J18.9 CAP (COMMUNITY ACQUIRED PNEUMONIA): Status: RESOLVED | Noted: 2019-03-25 | Resolved: 2019-03-25

## 2019-03-25 PROBLEM — Q21.12 PATENT FORAMEN OVALE: Status: ACTIVE | Noted: 2019-03-25

## 2019-03-25 PROBLEM — I21.4 NSTEMI (NON-ST ELEVATED MYOCARDIAL INFARCTION): Status: ACTIVE | Noted: 2019-03-20

## 2019-03-25 PROBLEM — J18.9 CAP (COMMUNITY ACQUIRED PNEUMONIA): Status: ACTIVE | Noted: 2019-03-25

## 2019-03-25 LAB
ALBUMIN SERPL BCP-MCNC: 3.1 G/DL (ref 3.5–5.2)
ALP SERPL-CCNC: 88 U/L (ref 55–135)
ALT SERPL W/O P-5'-P-CCNC: 57 U/L (ref 10–44)
ANION GAP SERPL CALC-SCNC: 15 MMOL/L (ref 8–16)
APTT BLDCRRT: 34.3 SEC (ref 21–32)
AST SERPL-CCNC: 17 U/L (ref 10–40)
BASOPHILS # BLD AUTO: 0.01 K/UL (ref 0–0.2)
BASOPHILS NFR BLD: 0.1 % (ref 0–1.9)
BILIRUB SERPL-MCNC: 0.6 MG/DL (ref 0.1–1)
BNP SERPL-MCNC: 1146 PG/ML (ref 0–99)
BUN SERPL-MCNC: 115 MG/DL (ref 8–23)
CALCIUM SERPL-MCNC: 6.9 MG/DL (ref 8.7–10.5)
CHLORIDE SERPL-SCNC: 103 MMOL/L (ref 95–110)
CO2 SERPL-SCNC: 16 MMOL/L (ref 23–29)
CREAT SERPL-MCNC: 5.4 MG/DL (ref 0.5–1.4)
DIFFERENTIAL METHOD: ABNORMAL
EOSINOPHIL # BLD AUTO: 0 K/UL (ref 0–0.5)
EOSINOPHIL NFR BLD: 0 % (ref 0–8)
ERYTHROCYTE [DISTWIDTH] IN BLOOD BY AUTOMATED COUNT: 15.9 % (ref 11.5–14.5)
EST. GFR  (AFRICAN AMERICAN): 10.7 ML/MIN/1.73 M^2
EST. GFR  (NON AFRICAN AMERICAN): 9.3 ML/MIN/1.73 M^2
GLUCOSE SERPL-MCNC: 278 MG/DL (ref 70–110)
HCT VFR BLD AUTO: 32.6 % (ref 40–54)
HGB BLD-MCNC: 10.6 G/DL (ref 14–18)
IMM GRANULOCYTES # BLD AUTO: 0.08 K/UL (ref 0–0.04)
IMM GRANULOCYTES NFR BLD AUTO: 1 % (ref 0–0.5)
LYMPHOCYTES # BLD AUTO: 0.7 K/UL (ref 1–4.8)
LYMPHOCYTES NFR BLD: 8.5 % (ref 18–48)
MAGNESIUM SERPL-MCNC: 2.4 MG/DL (ref 1.6–2.6)
MCH RBC QN AUTO: 31.3 PG (ref 27–31)
MCHC RBC AUTO-ENTMCNC: 32.5 G/DL (ref 32–36)
MCV RBC AUTO: 96 FL (ref 82–98)
MONOCYTES # BLD AUTO: 0.4 K/UL (ref 0.3–1)
MONOCYTES NFR BLD: 4.5 % (ref 4–15)
NEUTROPHILS # BLD AUTO: 6.8 K/UL (ref 1.8–7.7)
NEUTROPHILS NFR BLD: 85.9 % (ref 38–73)
NRBC BLD-RTO: 0 /100 WBC
PHOSPHATE SERPL-MCNC: 7.5 MG/DL (ref 2.7–4.5)
PLATELET # BLD AUTO: 97 K/UL (ref 150–350)
PMV BLD AUTO: 10.9 FL (ref 9.2–12.9)
POCT GLUCOSE: 136 MG/DL (ref 70–110)
POCT GLUCOSE: 158 MG/DL (ref 70–110)
POCT GLUCOSE: 253 MG/DL (ref 70–110)
POCT GLUCOSE: 265 MG/DL (ref 70–110)
POTASSIUM SERPL-SCNC: 4.8 MMOL/L (ref 3.5–5.1)
PROT SERPL-MCNC: 5.3 G/DL (ref 6–8.4)
RBC # BLD AUTO: 3.39 M/UL (ref 4.6–6.2)
SODIUM SERPL-SCNC: 134 MMOL/L (ref 136–145)
WBC # BLD AUTO: 7.96 K/UL (ref 3.9–12.7)

## 2019-03-25 PROCEDURE — 99223 PR INITIAL HOSPITAL CARE,LEVL III: ICD-10-PCS | Mod: GC,,, | Performed by: INTERNAL MEDICINE

## 2019-03-25 PROCEDURE — 83880 ASSAY OF NATRIURETIC PEPTIDE: CPT

## 2019-03-25 PROCEDURE — 99233 PR SUBSEQUENT HOSPITAL CARE,LEVL III: ICD-10-PCS | Mod: GC,,, | Performed by: PSYCHIATRY & NEUROLOGY

## 2019-03-25 PROCEDURE — 99223 PR INITIAL HOSPITAL CARE,LEVL III: ICD-10-PCS | Mod: ,,, | Performed by: SURGERY

## 2019-03-25 PROCEDURE — 99900035 HC TECH TIME PER 15 MIN (STAT)

## 2019-03-25 PROCEDURE — 25000003 PHARM REV CODE 250: Performed by: NURSE PRACTITIONER

## 2019-03-25 PROCEDURE — 99233 SBSQ HOSP IP/OBS HIGH 50: CPT | Mod: GC,,, | Performed by: INTERNAL MEDICINE

## 2019-03-25 PROCEDURE — 93880 EXTRACRANIAL BILAT STUDY: CPT | Performed by: SURGERY

## 2019-03-25 PROCEDURE — 25000242 PHARM REV CODE 250 ALT 637 W/ HCPCS: Performed by: INTERNAL MEDICINE

## 2019-03-25 PROCEDURE — 25000003 PHARM REV CODE 250: Performed by: INTERNAL MEDICINE

## 2019-03-25 PROCEDURE — 83735 ASSAY OF MAGNESIUM: CPT

## 2019-03-25 PROCEDURE — 99291 PR CRITICAL CARE, E/M 30-74 MINUTES: ICD-10-PCS | Mod: GC,,, | Performed by: PSYCHIATRY & NEUROLOGY

## 2019-03-25 PROCEDURE — 80100014 HC HEMODIALYSIS 1:1

## 2019-03-25 PROCEDURE — 85730 THROMBOPLASTIN TIME PARTIAL: CPT

## 2019-03-25 PROCEDURE — 94640 AIRWAY INHALATION TREATMENT: CPT

## 2019-03-25 PROCEDURE — 99223 1ST HOSP IP/OBS HIGH 75: CPT | Mod: ,,, | Performed by: SURGERY

## 2019-03-25 PROCEDURE — 20000000 HC ICU ROOM

## 2019-03-25 PROCEDURE — 93990 DOPPLER FLOW TESTING: CPT | Performed by: SURGERY

## 2019-03-25 PROCEDURE — 99233 PR SUBSEQUENT HOSPITAL CARE,LEVL III: ICD-10-PCS | Mod: GC,,, | Performed by: INTERNAL MEDICINE

## 2019-03-25 PROCEDURE — 99233 SBSQ HOSP IP/OBS HIGH 50: CPT | Mod: GC,,, | Performed by: PSYCHIATRY & NEUROLOGY

## 2019-03-25 PROCEDURE — 94761 N-INVAS EAR/PLS OXIMETRY MLT: CPT

## 2019-03-25 PROCEDURE — 99291 CRITICAL CARE FIRST HOUR: CPT | Mod: GC,,, | Performed by: PSYCHIATRY & NEUROLOGY

## 2019-03-25 PROCEDURE — 93970 EXTREMITY STUDY: CPT | Performed by: SURGERY

## 2019-03-25 PROCEDURE — 63600175 PHARM REV CODE 636 W HCPCS: Performed by: UROLOGY

## 2019-03-25 PROCEDURE — 80053 COMPREHEN METABOLIC PANEL: CPT

## 2019-03-25 PROCEDURE — 84100 ASSAY OF PHOSPHORUS: CPT

## 2019-03-25 PROCEDURE — 63600175 PHARM REV CODE 636 W HCPCS: Performed by: HOSPITALIST

## 2019-03-25 PROCEDURE — 25000003 PHARM REV CODE 250: Performed by: HOSPITALIST

## 2019-03-25 PROCEDURE — 85025 COMPLETE CBC W/AUTO DIFF WBC: CPT

## 2019-03-25 PROCEDURE — 99223 1ST HOSP IP/OBS HIGH 75: CPT | Mod: GC,,, | Performed by: INTERNAL MEDICINE

## 2019-03-25 RX ORDER — HEPARIN SODIUM,PORCINE/D5W 25000/250
12 INTRAVENOUS SOLUTION INTRAVENOUS CONTINUOUS
Status: DISCONTINUED | OUTPATIENT
Start: 2019-03-25 | End: 2019-03-31

## 2019-03-25 RX ORDER — SODIUM CHLORIDE 9 MG/ML
INJECTION, SOLUTION INTRAVENOUS ONCE
Status: CANCELLED | OUTPATIENT
Start: 2019-03-25 | End: 2019-03-25

## 2019-03-25 RX ORDER — AZITHROMYCIN 250 MG/1
250 TABLET, FILM COATED ORAL DAILY
Status: DISCONTINUED | OUTPATIENT
Start: 2019-03-26 | End: 2019-03-25

## 2019-03-25 RX ORDER — DIPHENHYDRAMINE HCL 25 MG
50 CAPSULE ORAL
Status: CANCELLED | OUTPATIENT
Start: 2019-03-25

## 2019-03-25 RX ORDER — SODIUM CHLORIDE 0.9 % (FLUSH) 0.9 %
5 SYRINGE (ML) INJECTION
Status: CANCELLED | OUTPATIENT
Start: 2019-03-25

## 2019-03-25 RX ADMIN — HEPARIN SODIUM AND DEXTROSE 12 UNITS/KG/HR: 10000; 5 INJECTION INTRAVENOUS at 12:03

## 2019-03-25 RX ADMIN — INSULIN ASPART 6 UNITS: 100 INJECTION, SOLUTION INTRAVENOUS; SUBCUTANEOUS at 07:03

## 2019-03-25 RX ADMIN — SODIUM CHLORIDE: 0.9 INJECTION, SOLUTION INTRAVENOUS at 12:03

## 2019-03-25 RX ADMIN — GABAPENTIN 300 MG: 300 CAPSULE ORAL at 09:03

## 2019-03-25 RX ADMIN — IPRATROPIUM BROMIDE AND ALBUTEROL SULFATE 3 ML: .5; 3 SOLUTION RESPIRATORY (INHALATION) at 08:03

## 2019-03-25 RX ADMIN — CARVEDILOL 25 MG: 25 TABLET, FILM COATED ORAL at 05:03

## 2019-03-25 RX ADMIN — PANTOPRAZOLE SODIUM 40 MG: 40 TABLET, DELAYED RELEASE ORAL at 08:03

## 2019-03-25 RX ADMIN — LOSARTAN POTASSIUM 100 MG: 50 TABLET, FILM COATED ORAL at 09:03

## 2019-03-25 RX ADMIN — TORSEMIDE 20 MG: 20 TABLET ORAL at 09:03

## 2019-03-25 RX ADMIN — CARVEDILOL 25 MG: 25 TABLET, FILM COATED ORAL at 08:03

## 2019-03-25 RX ADMIN — TAMSULOSIN HYDROCHLORIDE 0.4 MG: 0.4 CAPSULE ORAL at 08:03

## 2019-03-25 RX ADMIN — PREDNISONE 40 MG: 20 TABLET ORAL at 08:03

## 2019-03-25 RX ADMIN — ASPIRIN 81 MG: 81 TABLET, COATED ORAL at 08:03

## 2019-03-25 RX ADMIN — ALLOPURINOL 100 MG: 100 TABLET ORAL at 09:03

## 2019-03-25 RX ADMIN — MIRTAZAPINE 7.5 MG: 7.5 TABLET ORAL at 09:03

## 2019-03-25 RX ADMIN — ATORVASTATIN CALCIUM 80 MG: 20 TABLET, FILM COATED ORAL at 09:03

## 2019-03-25 RX ADMIN — AZITHROMYCIN 250 MG: 250 TABLET, FILM COATED ORAL at 09:03

## 2019-03-25 RX ADMIN — IPRATROPIUM BROMIDE AND ALBUTEROL SULFATE 3 ML: .5; 3 SOLUTION RESPIRATORY (INHALATION) at 04:03

## 2019-03-25 RX ADMIN — FINASTERIDE 5 MG: 5 TABLET, FILM COATED ORAL at 08:03

## 2019-03-25 RX ADMIN — FLUTICASONE PROPIONATE 100 MCG: 50 SPRAY, METERED NASAL at 08:03

## 2019-03-25 RX ADMIN — INSULIN DETEMIR 12 UNITS: 100 INJECTION, SOLUTION SUBCUTANEOUS at 08:03

## 2019-03-25 NOTE — PLAN OF CARE
Problem: Adult Inpatient Plan of Care  Goal: Patient-Specific Goal (Individualization)  Outcome: Ongoing (interventions implemented as appropriate)  Arrived to unit Stroke code from the floor. MRI/CT completed. .Pt remains  in Afib through out shift. SBP goal 140-220. Remain HOB flat to increase cerebral perfusion. US of abdomen order due to abdomen distention and tenderness. Pt NPO. Kersey placed. Albumin given.  POC reviewed with pt and family at 1400. Pt verbalized understanding. Questions and concerns addressed. No acute events today. Pt progressing toward goals. Will continue to monitor. See flowsheets for full assessment and VS info.

## 2019-03-25 NOTE — HOSPITAL COURSE
3/25: NAEO.   3/26 Had few beats of Vtach this am   3/27: NAEO  3/28: NAEO  03/28/2019 RUE appeared swollen and tender with worsening weakness. Repeat CT with no new infarct or hemorrhage. Omaira and IV removed. US RUE ordered, orthopedics consulted. EEG pending.   03/29/2019 R groin hematoma noted on exam. ASA held for drop in platelets. Coreg decreased.  03/30/2019 Added clindamycin. Step down to VN

## 2019-03-25 NOTE — ASSESSMENT & PLAN NOTE
-Tn elevated to 0.09 during admit, no EKG changes/WMA  - Unclear type I vs type II in setting of heart failure, pulmonary issues (?asthma, pna) for which he was being treated  - EF intact on echo 55%, no WMA  - No ongoing signs/symptoms of ischemia  - Recommend DAPT (ASA + plavix) if safe from a neurologic perspective; given elevated bleeding risk, single antiplatelet + anticoagulation also reasonable  - Continue coreg 25 bid, target resting HR 60  - Continue high intensity statin  - PRN nitro if CP, can add long-acting if recurrent    - Recommend medical management at this time given acute stroke; ideally would wait at least one month prior to reconsidering  - Please notify if symptoms concerning for new ischemia which would potentially necessitate urgent catheterization

## 2019-03-25 NOTE — ASSESSMENT & PLAN NOTE
· Patent PFO noted on most recent TTE  · Recommend lower extremity ultrasound to evaluate for source of paradoxical embolus. Could also have an atrial thrombus, of course, given history of AF.

## 2019-03-25 NOTE — ASSESSMENT & PLAN NOTE
· Highly doubt asthma as culprit for SOB  · Pt's BNP is >1100. Recommend diuresis  · Can continue duonebs, but only needs them PRN. He does not have a smoking history nor prior diagnosis of asthma.  · Recommend stopping steroids.

## 2019-03-25 NOTE — ASSESSMENT & PLAN NOTE
-Elevated [id number] 7  -Recommend heparin gtt, transition to oral anticoagulation prior to discharge

## 2019-03-25 NOTE — PROGRESS NOTES
[facility]  Nephrology  Progress Note    Patient Name: Micheal Hunt  MRN: [id number]  Admission Date: 3/22/2019  Hospital Length of Stay: 3 days  Attending Provider: Jean Paul Watson MD   Primary Care Physician: Pk Lakhani MD  Principal Problem:Acute ischemic left PCA stroke    Subjective:     HPI: 79 y/o male with PMH ESRD on HD,  HTN, HLD, CAD (s/p CABG 2007), paroxysmal AFib, chronic diastolic heart failure, asthma, T2DM, BPH, recent hospitalization 2/2 PNA (3/11/19)  transferred from [facility] for evaluation of unstable angina    History obtained from the patient and the medical chart        Interval History:   Patient evaluated at bedside, no significant event overnight.     Review of patient's allergies indicates:   Allergen Reactions    Ace inhibitors Other (See Comments)     Cough    Arb-angiotensin receptor antagonist Itching    Eplerenone Other (See Comments)     Marked bradycardia, 40, tiredness and weakness      Sulfa (sulfonamide antibiotics) Itching     Patient says this was 10 years ago and doesn't remember what happened     Current Facility-Administered Medications   Medication Frequency    0.9%  NaCl infusion PRN    0.9%  NaCl infusion PRN    acetaminophen tablet 650 mg Q6H PRN    albuterol-ipratropium 2.5 mg-0.5 mg/3 mL nebulizer solution 3 mL Q4H    albuterol-ipratropium 2.5 mg-0.5 mg/3 mL nebulizer solution 3 mL Q6H PRN    allopurinol tablet 100 mg Daily    aspirin EC tablet 81 mg Daily    atorvastatin tablet 80 mg Daily    azithromycin tablet 250 mg Daily    carvedilol tablet 25 mg BID WM    dextrose 50% injection 12.5 g PRN    dextrose 50% injection 25 g PRN    finasteride tablet 5 mg Daily    fluticasone 50 mcg/actuation nasal spray 100 mcg Daily    gabapentin capsule 300 mg QHS    glucagon (human recombinant) injection 1 mg PRN    glucose chewable tablet 16 g PRN    glucose chewable tablet 24 g PRN    insulin aspart U-100 pen 1-10  Units QID (AC + HS) PRN    insulin detemir U-100 pen 12 Units Daily    losartan tablet 100 mg QHS    magnesium oxide tablet 800 mg PRN    magnesium oxide tablet 800 mg PRN    mirtazapine tablet 7.5 mg QHS    nitroGLYCERIN SL tablet 0.4 mg Q5 Min PRN    ondansetron tablet 8 mg Q6H PRN    pantoprazole EC tablet 40 mg Daily    potassium chloride 10% oral solution 40 mEq PRN    potassium chloride 10% oral solution 40 mEq PRN    predniSONE tablet 40 mg Daily    senna tablet 8.6 mg Daily PRN    sertraline tablet 25 mg Daily    [START ON 3/29/2019] sertraline tablet 50 mg Daily    tamsulosin 24 hr capsule 0.4 mg Daily    torsemide tablet 20 mg BID       Objective:     Vital Signs (Most Recent):  Temp: 98.2 °F (36.8 °C) (03/25/19 0705)  Pulse: 72 (03/25/19 0905)  Resp: 18 (03/25/19 0905)  BP: (!) 123/92 (03/25/19 0905)  SpO2: 99 % (03/25/19 0905)  O2 Device (Oxygen Therapy): room air (03/25/19 0820) Vital Signs (24h Range):  Temp:  [97.7 °F (36.5 °C)-99 °F (37.2 °C)] 98.2 °F (36.8 °C)  Pulse:  [] 72  Resp:  [13-41] 18  SpO2:  [87 %-100 %] 99 %  BP: (104-254)/() 123/92  Arterial Line BP: (136-218)/() 157/71     Weight: 106.6 kg (235 lb 0.2 oz) (03/24/19 0600)  Body mass index is 31.01 kg/m².  Body surface area is 2.34 meters squared.    I/O last 3 completed shifts:  In: 803.3 [P.O.:210; I.V.:593.3]  Out: 2275 [Urine:2275]    Physical Exam   Constitutional: No distress.   HENT:   Head: Normocephalic.   Right Ear: External ear normal.   Eyes: Right eye exhibits no discharge. Left eye exhibits no discharge.   Cardiovascular: An irregular rhythm present.   Pulmonary/Chest: Effort normal. No respiratory distress.   Abdominal: Soft.   Neurological: He is alert.   Skin: Skin is warm.       Significant Labs:  ABGs:   Recent Labs   Lab 03/24/19  1419   PH 7.397   PCO2 31.7*   HCO3 19.5*   POCSATURATED 97   BE -5     BMP:   Recent Labs   Lab 03/25/19  0211   *      CO2 16*   *    CREATININE 5.4*   CALCIUM 6.9*   MG 2.4     CBC:   Recent Labs   Lab 03/25/19 0212   WBC 7.96   RBC 3.39*   HGB 10.6*   HCT 32.6*   PLT 97*   MCV 96   MCH 31.3*   MCHC 32.5     CMP:   Recent Labs   Lab 03/25/19  0211   *   CALCIUM 6.9*   ALBUMIN 3.1*   PROT 5.3*   *   K 4.8   CO2 16*      *   CREATININE 5.4*   ALKPHOS 88   ALT 57*   AST 17   BILITOT 0.6     All labs within the past 24 hours have been reviewed.       Assessment/Plan:     ESRD (end stage renal disease) on dialysis  Micheal Hunt is a 78 year old man who is ESRD which presented with L PCA infarct     Dialysis Information: [facility]  -Out patient HD unit: [facility]  -Nephrologist: Dr. Valderrama in [place]  -HD tx days: MWF  -HD tx time: 4 hours  -HD access: AVF   -Last HD: yesterday  -EDW: 106.0kg  -RRF: yes      Assessment and plan:  - Will discuss case with neuro critical to plan HD treatment today for patient for clearance and volume removal. Bath will be adjusted to chem       Severe persistent asthma with acute exacerbation  -Managed by Hospital Medicine    Anemia of chronic disease  - Hgb at goal 10.6       Wilfred Pittman  Nephrology  Fellow  [facility]    Pager [phone]

## 2019-03-25 NOTE — ASSESSMENT & PLAN NOTE
Stoke risk factor   No ongoing signs/symptoms of ischemia  Continue single Aspirin 81mg and heparin gtt  Continue coreg 25 bid  Continue high intensity statin

## 2019-03-25 NOTE — ASSESSMENT & PLAN NOTE
Micheal Hunt is a 78 year old man who is ESRD which presented with L PCA infarct     Dialysis Information: [facility]  -Out patient HD unit: [facility]  -Nephrologist: Dr. Valderrama in [place]  -HD tx days: MWF  -HD tx time: 4 hours  -HD access: AVF   -Last HD: yesterday  -EDW: 106.0kg  -RRF: yes      Assessment and plan:  - Will discuss case with neuro critical to plan HD treatment today for patient for clearance and volume removal. Bath will be adjusted to chem

## 2019-03-25 NOTE — PT/OT/SLP PROGRESS
Physical Therapy      Patient Name:  Micheal Hunt   MRN:  [id number]    New orders received prior to pt transferring to ICU. New orders needed for PT to see pt. Pt currently with HOB flat orders per recent OT note.     Bridgette Peter, PT, DPT  3/25/2019

## 2019-03-25 NOTE — SUBJECTIVE & OBJECTIVE
Neurologic Chief Complaint: right sided weakness    Subjective:     Interval History: Patient is seen for follow-up neurological assessment and treatment recommendations: see hospital course for interval history    HPI, Past Medical, Family, and Social History remains the same as documented in the initial encounter.     Review of Systems   Constitutional: Negative for chills, fatigue and fever.   HENT: Negative for postnasal drip and sore throat.    Eyes: Negative.    Respiratory: Positive for shortness of breath. Negative for chest tightness.    Cardiovascular: Negative for chest pain, palpitations and leg swelling.   Gastrointestinal: Negative for abdominal pain, blood in stool, constipation and nausea.   Endocrine: Negative.    Genitourinary: Negative for dysuria.   Musculoskeletal: Negative for arthralgias, back pain and joint swelling.   Skin: Negative.    Allergic/Immunologic: Negative.    Neurological: Positive for dizziness, speech difficulty and weakness. Negative for light-headedness and headaches.   Psychiatric/Behavioral: Negative for confusion and dysphoric mood. The patient is not nervous/anxious and is not hyperactive.      Scheduled Meds:   albuterol-ipratropium  3 mL Nebulization Q4H    allopurinol  100 mg Oral Daily    aspirin  81 mg Oral Daily    atorvastatin  80 mg Oral Daily    carvedilol  25 mg Oral BID WM    finasteride  5 mg Oral Daily    fluticasone  2 spray Each Nare Daily    gabapentin  300 mg Oral QHS    insulin detemir U-100  12 Units Subcutaneous Daily    losartan  100 mg Oral QHS    mirtazapine  7.5 mg Oral QHS    sertraline  25 mg Oral Daily    [START ON 3/29/2019] sertraline  50 mg Oral Daily    tamsulosin  1 capsule Oral Daily    torsemide  20 mg Oral BID     Continuous Infusions:   heparin (porcine) in D5W 12 Units/kg/hr (03/25/19 2149)     PRN Meds:sodium chloride 0.9%, sodium chloride 0.9%, acetaminophen, albuterol-ipratropium, dextrose 50%, dextrose 50%, glucagon  (human recombinant), glucose, glucose, heparin (PORCINE), heparin (PORCINE), insulin aspart U-100, magnesium oxide, magnesium oxide, nitroGLYCERIN, ondansetron, potassium chloride 10%, potassium chloride 10%, senna    Objective:     Vital Signs (Most Recent):  Temp: 98.1 °F (36.7 °C) (03/25/19 1505)  Pulse: 71 (03/25/19 1605)  Resp: (!) 23 (03/25/19 1605)  BP: (!) 153/80 (03/25/19 1605)  SpO2: 99 % (03/25/19 1605)  BP Location: Right arm    Vital Signs Range (Last 24H):  Temp:  [97.8 °F (36.6 °C)-98.2 °F (36.8 °C)]   Pulse:  []   Resp:  [13-41]   BP: (123-254)/()   SpO2:  [87 %-100 %]   Arterial Line BP: (136-218)/()   BP Location: Right arm    Physical Exam   Constitutional: He is oriented to person, place, and time. He appears well-developed and well-nourished. He is cooperative.  Non-toxic appearance. He does not have a sickly appearance. He does not appear ill. No distress.   HENT:   Head: Normocephalic and atraumatic.   Eyes: Pupils are equal, round, and reactive to light.   Cardiovascular: S1 normal, S2 normal and intact distal pulses. An irregularly irregular rhythm present.   Murmur heard.  Pulses:       Radial pulses are 2+ on the right side, and 2+ on the left side.        Dorsalis pedis pulses are 2+ on the right side, and 2+ on the left side.   Pulmonary/Chest: He has decreased breath sounds. He has rales.   Abdominal: Soft.   Musculoskeletal: He exhibits no edema.   Neurological: He is alert and oriented to person, place, and time. No cranial nerve deficit or sensory deficit.   Slight facial droop on R   4/5 strength on R UE  5/5 in all other extremities   Some evidence of hemispatial neglect    Skin: Skin is warm and dry.   Nursing note and vitals reviewed.      Neurological Exam:   LOC: alert  Language: No aphasia, Other: higher level word finding difficulty  Pupils (CN II, III): PERRL    Laboratory:  Recent Results (from the past 24 hour(s))   POCT glucose    Collection Time:  03/24/19 11:49 PM   Result Value Ref Range    POCT Glucose 279 (H) 70 - 110 mg/dL   Comprehensive metabolic panel    Collection Time: 03/25/19  2:11 AM   Result Value Ref Range    Sodium 134 (L) 136 - 145 mmol/L    Potassium 4.8 3.5 - 5.1 mmol/L    Chloride 103 95 - 110 mmol/L    CO2 16 (L) 23 - 29 mmol/L    Glucose 278 (H) 70 - 110 mg/dL    BUN, Bld 115 (H) 8 - 23 mg/dL    Creatinine 5.4 (H) 0.5 - 1.4 mg/dL    Calcium 6.9 (LL) 8.7 - 10.5 mg/dL    Total Protein 5.3 (L) 6.0 - 8.4 g/dL    Albumin 3.1 (L) 3.5 - 5.2 g/dL    Total Bilirubin 0.6 0.1 - 1.0 mg/dL    Alkaline Phosphatase 88 55 - 135 U/L    AST 17 10 - 40 U/L    ALT 57 (H) 10 - 44 U/L    Anion Gap 15 8 - 16 mmol/L    eGFR if African American 10.7 (A) >60 mL/min/1.73 m^2    eGFR if non  9.3 (A) >60 mL/min/1.73 m^2   Magnesium    Collection Time: 03/25/19  2:11 AM   Result Value Ref Range    Magnesium 2.4 1.6 - 2.6 mg/dL   Phosphorus    Collection Time: 03/25/19  2:11 AM   Result Value Ref Range    Phosphorus 7.5 (H) 2.7 - 4.5 mg/dL   Brain natriuretic peptide    Collection Time: 03/25/19  2:11 AM   Result Value Ref Range    BNP 1,146 (H) 0 - 99 pg/mL   CBC with Automated Differential    Collection Time: 03/25/19  2:12 AM   Result Value Ref Range    WBC 7.96 3.90 - 12.70 K/uL    RBC 3.39 (L) 4.60 - 6.20 M/uL    Hemoglobin 10.6 (L) 14.0 - 18.0 g/dL    Hematocrit 32.6 (L) 40.0 - 54.0 %    MCV 96 82 - 98 fL    MCH 31.3 (H) 27.0 - 31.0 pg    MCHC 32.5 32.0 - 36.0 g/dL    RDW 15.9 (H) 11.5 - 14.5 %    Platelets 97 (L) 150 - 350 K/uL    MPV 10.9 9.2 - 12.9 fL    Immature Granulocytes 1.0 (H) 0.0 - 0.5 %    Gran # (ANC) 6.8 1.8 - 7.7 K/uL    Immature Grans (Abs) 0.08 (H) 0.00 - 0.04 K/uL    Lymph # 0.7 (L) 1.0 - 4.8 K/uL    Mono # 0.4 0.3 - 1.0 K/uL    Eos # 0.0 0.0 - 0.5 K/uL    Baso # 0.01 0.00 - 0.20 K/uL    nRBC 0 0 /100 WBC    Gran% 85.9 (H) 38.0 - 73.0 %    Lymph% 8.5 (L) 18.0 - 48.0 %    Mono% 4.5 4.0 - 15.0 %    Eosinophil% 0.0 0.0 - 8.0 %     Basophil% 0.1 0.0 - 1.9 %    Differential Method Automated    POCT glucose    Collection Time: 03/25/19  7:42 AM   Result Value Ref Range    POCT Glucose 265 (H) 70 - 110 mg/dL   POCT glucose    Collection Time: 03/25/19 10:45 AM   Result Value Ref Range    POCT Glucose 158 (H) 70 - 110 mg/dL         Diagnostic Results     Brain Imaging   Under vessel imaging  Vessel Imaging   VAS US GIOVANY LEGS  Report Summary:  Impression:   Right Leg:  Color flow evaluation of the lower extremity demonstrates fully compressible and patent veins. There is no evidence of venous thrombosis in the deep or superficial veins.  Incidental finding of significant reflux noted in the CFV.    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 There is no evidence of venous thrombosis in the remaining deep veins.    VAS US Carotid Bilaterally  Report Summary:  Impression:   RIGHT SIDE:  Velocities are suggestive of a 40-59% right ICA stenosis; however, it  visually appears to be in the 60-79% range.   Calcification of the right internal carotid artery, which denies complete visualization.   Heterogeneous plaque in the right common carotid artery.   Antegrade flow in the right vertebral artery.   LEFT SIDE:  1 - 39% left ICA stenosis.   Calcification of the left internal carotid artery.   Heterogeneous plaque in the left common carotid artery.   Retrograde flow in the left vertebral artery.    MRI 3/24 Brain Ischemic protocol   Punctate posterior left thalamic acute infarction.  No mass effect or hemorrhage.    Focal decreased signal within the proximal left PCA, suggestive of high-grade stenosis.    Moderate to high-grade narrowing of the intra cavernous/ supraclinoid ICAs bilaterally.    High-grade stenosis distal left vertebral artery.    CTA STROKE MULTIPHASE 3/24  No acute intracranial hemorrhage or mass effect.  No ischemic changes appreciated on  CT.    Extensive calcified atherosclerotic disease resulting:    Left PCA with a high-grade stenosis.    High-grade (>70%) stenosis right proximal ICA and moderate (50-70%) stenosis left proximal ICA.    Moderate to high-grade stenosis of the intra cavernous/ supraclinoid ICAs bilaterally.    High-grade stenosis distal left vertebral artery.    Cardiac Imaging   TRANSTHORACIC ECHO (TTE) COMPLETE 3/23/2019  · Eccentric left ventricular hypertrophy.Normal left ventricular systolic function. The estimated ejection fraction is 55%  · Severe left atrial enlargement.  · Indeterminate left ventricular diastolic function.  · Moderate aortic valve stenosis. Aortic valve area is 1.37 cm2; peak velocity is 2.78 m/s; mean gradient is 22.39 mmHg. Mild aortic regurgitation.  · Mild-to-moderate mitral regurgitation.  · Mild to moderate tricuspid regurgitation.  · The estimated PA systolic pressure is 41 mm Hg  · Severe right atrial enlargement.  · Normal right ventricular systolic function. The right ventricle is mildly dilated.  · Patent foramen ovale present with left to right shunting indicated by color flow Doppler.  · Normal central venous pressure (3 mm Hg).  · Pulmonary hypertension present.

## 2019-03-25 NOTE — ASSESSMENT & PLAN NOTE
-Management as per Vascular Neuro and   -Likely etiology atherosclerotic disease cerebral vessels. Also with carotid stenosis (R >70, L50-70%), pAF (recent dx, intermittently anticoagulated), PFO.  -Agree with venous U/S  -Agree with need for oral anticoagulation given pAF with very elevated [id number]

## 2019-03-25 NOTE — PT/OT/SLP PROGRESS
Occupational Therapy      Patient Name:  Micheal Hunt   MRN:  [id number]    Patient not seen today secondary to pt with active orders to remain with HOB flat to increase cerebral perfusion (orders  3/25 at 1800). Will follow-up as appropriate for OOB activity.    DIANE Justice  3/25/2019

## 2019-03-25 NOTE — HPI
Mr. Hunt is a 77yo male with a PMHx of asthma, HTN, CAD s/p CABG 2007, AF, HFpEF (LVEF 55%) with severe biatrial enlargement and PFO, DM and ESRD. Pt was initially cared for at [facility] where he was being evaluated for unstable angina and SOB. Pt was evaluated and treated by pulmonary with bronchodilators and steroids, but his wheeze and SOB did not readily respond. He was seen by cardiology with a tentative plan to cath him based on a pre-transfer consultation. [place] [facility] [place].    On 3/24, pt noted to be more lethargic, disoriented, with left facial droop and right-sided weakness. Stroke code => CT => MRI showed acute left PCA infarct. Transferred to neuro-ICU for further management.     Pulmonary consulted for asthma. Currently noted to be 98% on room air.

## 2019-03-25 NOTE — SUBJECTIVE & OBJECTIVE
Facility-Administered Medications Prior to Admission   Medication Dose Route Frequency Provider Last Rate Last Dose    albuterol nebulizer solution 1.25 mg  1.25 mg Nebulization Q6H PRN Telma Powers NP   1.25 mg at 02/26/19 1134     Medications Prior to Admission   Medication Sig Dispense Refill Last Dose    albuterol (PROVENTIL/VENTOLIN HFA) 90 mcg/actuation inhaler 2 puffs every 4 hours as needed for cough, wheeze, or shortness of breath 3 Inhaler 3 3/22/2019 at 0400    allopurinol (ZYLOPRIM) 100 MG tablet TAKE 1 TABLET BY MOUTH EVERY DAY 90 tablet 1 3/22/2019 at 0900    aspirin (ECOTRIN) 81 MG EC tablet Take 81 mg by mouth once daily.     3/22/2019 at 100    atorvastatin (LIPITOR) 80 MG tablet TAKE 1 TABLET (80 MG TOTAL) BY MOUTH ONCE DAILY. 90 tablet 3 3/22/2019 at 1000    budesonide-formoterol 160-4.5 mcg (SYMBICORT) 160-4.5 mcg/actuation HFAA Inhale 2 puffs into the lungs every 12 (twelve) hours. Controller 3 Inhaler 4 3/22/2019 at Unknown time    carvedilol (COREG) 6.25 MG tablet Take 1 tablet (6.25 mg total) by mouth 2 (two) times daily with meals. 60 tablet 3 3/22/2019 at Unknown time    celecoxib (CELEBREX) 200 MG capsule Take 1 capsule (200 mg total) by mouth once daily. 30 capsule 3 3/22/2019 at Unknown time    finasteride (PROSCAR) 5 mg tablet TAKE 1 TABLET ONCE DAILY. 90 tablet 3 3/22/2019 at Unknown time    gabapentin (NEURONTIN) 300 MG capsule Take 1 capsule (300 mg total) by mouth every evening. 90 capsule 3 3/22/2019 at Unknown time    isosorbide mononitrate (ISMO,MONOKET) 10 mg tablet TAKE 1 TABLET BY MOUTH EVERY DAY IN THE EVENING 30 tablet 3 3/22/2019 at Unknown time    losartan (COZAAR) 100 MG tablet TAKE 1 TABLET BY MOUTH EVERY DAY 90 tablet 0 3/22/2019 at Unknown time    mirtazapine (REMERON) 7.5 MG Tab TAKE 1 TABLET BY MOUTH EVERY EVENING. 30 tablet 2 3/21/2019 at Unknown time    omeprazole (PRILOSEC) 20 MG capsule TAKE 1 CAPSULE BY MOUTH EVERY DAY 30 capsule 1 3/22/2019  at Unknown time    predniSONE (DELTASONE) 20 MG tablet 3 pills for 3 days, 2 pills for 3 days, 1 pill for 3 days, repeat if needed. 36 tablet 0 3/22/2019 at Unknown time    tamsulosin (FLOMAX) 0.4 mg Cap TAKE 1 CAPSULE BY MOUTH EVERY DAY 30 capsule 2 3/22/2019 at Unknown time    torsemide (DEMADEX) 20 MG Tab Take 1 tablet (20 mg total) by mouth 2 (two) times daily. 60 tablet 1 3/22/2019 at Unknown time    albuterol-ipratropium (DUO-NEB) 2.5 mg-0.5 mg/3 mL nebulizer solution Take 3 mLs by nebulization every 6 (six) hours as needed for Wheezing or Shortness of Breath. 270 mL 0 3/10/2019 at Unknown time    fluticasone (FLONASE) 50 mcg/actuation nasal spray 2 sprays (100 mcg total) by Each Nare route once daily. 3 Bottle 3 3/10/2019 at Unknown time    meclizine (ANTIVERT) 25 mg tablet TAKE 1 TABLET BY MOUTH THREE TIMES A DAY AS NEEDED 90 tablet 0 Past Week at Unknown time    meclizine (ANTIVERT) 25 mg tablet Take 1 tablet (25 mg total) by mouth 3 (three) times daily as needed for Dizziness. 30 tablet 0 Past Week at Unknown time    NITROSTAT 0.4 mg SL tablet PLACE 1 TABLET (0.4 MG TOTAL) UNDER THE TONGUE EVERY 5 (FIVE) MINUTES AS NEEDED FOR CHEST PAIN. 25 tablet 3 Unknown at Unknown time    sodium chloride (SALINE NASAL MIST) 3 % Mist 1 spray by Nasal route 2 (two) times daily.   Past Month at Unknown time    tiotropium bromide (SPIRIVA RESPIMAT) 1.25 mcg/actuation Mist Inhale 2.5 mcg into the lungs once daily. Controller 4 g 5 Unknown at Unknown time    warfarin (COUMADIN) 7.5 MG tablet Take 1 tablet (7.5 mg total) by mouth Daily. 30 tablet 1 Unknown at Unknown time    zolpidem (AMBIEN) 5 MG Tab TAKE 1 TABLET BY MOUTH EVERY EVENING AS NEEDED 30 tablet 3 Past Week at Unknown time    zolpidem (AMBIEN) 5 MG Tab TAKE 1 TABLET BY MOUTH EVERY EVENING AS NEEDED 30 tablet 3 Past Week at Unknown time       Review of patient's allergies indicates:   Allergen Reactions    Ace inhibitors Other (See Comments)     Cough     Arb-angiotensin receptor antagonist Itching    Eplerenone Other (See Comments)     Marked bradycardia, 40, tiredness and weakness      Sulfa (sulfonamide antibiotics) Itching     Patient says this was 10 years ago and doesn't remember what happened       Past Medical History:   Diagnosis Date    NICK (acute kidney injury) 7/29/2016    Allergy     Anemia, mild 12/15/2014    Arthritis     Gout    Benign essential HTN 3/27/2012    BMI 29.0-29.9,adult 5/10/2018    BPH (benign prostatic hyperplasia)     BPH (benign prostatic hyperplasia)     CAD (coronary artery disease) 2006    Chronic kidney disease     due to ibuprofen    Colon polyp     CRF (chronic renal failure), stage 5     Diverticulosis     Gastritis     GERD (gastroesophageal reflux disease)     Gout     History of colon polyps 5/3/2018    HTN (hypertension) 3/27/2012    Hyperlipidemia     Hyperlipidemia     LLL pneumonia 6/14/2018    LVH (left ventricular hypertrophy)     Mesenteric ischemia     Murmur, cardiac 3/27/2012    GRICELDA (obstructive sleep apnea)     DOES NOT USE A MACHINE    Sinus problem     Syncope and collapse      Past Surgical History:   Procedure Laterality Date    APPENDECTOMY      BLOCK-NERVE Left 5/31/2016    [facility]    CARDIAC CATHETERIZATION      COLONOSCOPY  2011    COLONOSCOPY N/A 5/3/2018    [facility] ENDO    COLONOSCOPY N/A 9/10/2015    [facility] ENDO    CORONARY ARTERY BYPASS GRAFT  4/2007    x 1    CYSTOSCOPY N/A 8/30/2017    [facility] at [facility]    CYSTOSCOPY N/A 11/10/2015    [facility] OR    ESOPHAGOGASTRODUODENOSCOPY (EGD) N/A 1/4/2016    [facility] (2ND F[facility])    ESOPHAGOGASTRODUODENOSCOPY (EGD) N/A 10/15/2014    Per[facility] by [facility] at [facility] ENDO    INJECTION-STEROID-EPIDURAL-TRANSFORAMINAL Left  2/25/2016    [facility] OR    JOINT REPLACEMENT      left knee total replacement  X 3    mid leftt finger      from a cactuss    MOLE REMOVAL  2016    RADIOFREQUENCY THERMOCOAGULATION (RFTC)-NERVE-MEDIAN BRANCH-LUMBAR Left 4/11/2016    [facility] OR    rotative cuff      no rotative cuffs on bilat shoulders has pins     SHOULDER SURGERY      shoulder surgery bilat  RIGHT X 4; LEFT X 3    TRANSRECTAL ULTRASOUND GUIDED PROSTATE BIOPSY Bilateral 11/10/2015    [facility] OR    ULTRASOUND-ENDOSCOPIC-UPPER N/A 5/31/2017    Performed by [facility] at [facility] ENDO (2ND FLR)    ULTRASOUND-ENDOSCOPIC-UPPER N/A 1/4/2016    Performed by Tra Brooks [facility] at [facility] ENDO (2ND FLR)    ULTRASOUND-ENDOSCOPIC-UPPER N/A 1/16/2015    Performed by Jason Saleem MD at [facility] ENDO (2ND FLR)     Family History     Problem Relation (Age of Onset)    Cancer Father    Heart disease Mother, Sister    Hypertension Brother    Pneumonia Sister    Stroke Sister    Sudden death Father        Tobacco Use    Smoking status: Never Smoker    Smokeless tobacco: Never Used   Substance and Sexual Activity    Alcohol use: No    Drug use: No    Sexual activity: Not on file     Review of Systems   All other systems reviewed and are negative.    Objective:     Vital Signs (Most Recent):  Temp: 98 °F (36.7 °C) (03/25/19 1105)  Pulse: 67 (03/25/19 1205)  Resp: (!) 25 (03/25/19 1205)  BP: (!) 147/103 (03/25/19 1205)  SpO2: 96 % (03/25/19 1205) Vital Signs (24h Range):  Temp:  [97.7 °F (36.5 °C)-98.2 °F (36.8 °C)] 98 °F (36.7 °C)  Pulse:  [] 67  Resp:  [13-41] 25  SpO2:  [87 %-100 %] 96 %  BP: (123-254)/() 147/103  Arterial Line BP: (136-218)/() 160/76     Weight: 106.6 kg (235 lb 0.2 oz)  Body mass index is 31.01 kg/m².    Physical Exam   Constitutional: He appears well-developed and well-nourished.   HENT:   Head: Normocephalic and atraumatic.    Eyes: Conjunctivae and EOM are normal.   Neck: Normal range of motion. Neck supple.   Pulmonary/Chest: Effort normal. No respiratory distress.   Abdominal: Soft. He exhibits no distension.   Musculoskeletal:   RUE 5/5  RLE 5/5  LUE 5/5  L brachiocephalic fistula strong thrill proximally, somewhat pulsatile thrill distally  LLE 5/5   Neurological:   Moderate dyarthria  No facial droop       Significant Labs:  CBC:   Recent Labs   Lab 03/25/19  0212   WBC 7.96   RBC 3.39*   HGB 10.6*   HCT 32.6*   PLT 97*   MCV 96   MCH 31.3*   MCHC 32.5     CMP:   Recent Labs   Lab 03/25/19  0211   *   CALCIUM 6.9*   ALBUMIN 3.1*   PROT 5.3*   *   K 4.8   CO2 16*      *   CREATININE 5.4*   ALKPHOS 88   ALT 57*   AST 17   BILITOT 0.6       Significant Diagnostics:  I have reviewed all pertinent imaging results/findings within the past 24 hours.   CTA L ICA 50-70%, R ICA >70% stenosis  Carotid duplex L ICA 1-39%, R ICA 60-79%  BLE venous duplex chronic occlusive thrombus L peroneal

## 2019-03-25 NOTE — ASSESSMENT & PLAN NOTE
· Likely embolic   · Recommend heparin infusion and lower extremity ultrasound to evaluate as a possible source given his PFO. Biatrial enlargement a risk factor for clot, as well.  · Mgmt per primary.

## 2019-03-25 NOTE — PROGRESS NOTES
[facility]  Vascular Neurology  Comprehensive Stroke Center  Progress Note    Assessment/Plan:     * Acute ischemic left PCA stroke  Likely embolic, cardiac given his history of Afib and CAD    Patient is not a TPA candidate had symptoms for more than 4.5 hours which was confirmed by present of flare on MRI scan.  Symptoms improved with laying flat, likely flow depended    - keep patient flat, elevate the bed as tolerated in an attempt to graduate him, symptoms may worsen and then suggest patient be returned to supine position   - will need to continue stress test with elevation if patient tolerates     Antithrombotics for secondary stroke prevention: Antiplatelets: Aspirin: 81 mg daily    Statins for secondary stroke prevention and hyperlipidemia, if present:   Statins: Atorvastatin- 40 mg daily    Aggressive risk factor modification: HTN, DM, HLD, A-Fib, CAD     Rehab efforts: PT/OT/SLP to evaluate and treat, PM&R consult     Diagnostics ordered/pending: none    VTE prophylaxis: Heparin gtt     BP parameters: Infarct: No intervention, SBP <220        Paroxysmal atrial fibrillation  chadsvasc 6  stroke risk factor   on coreg for rate control   Need to discuss risk and benefit of anticoagulation and start prior to discharge     ESRD (end stage renal disease) on dialysis  Continue scheduled HD   Patient is being followed by nephrology, appreciate their assistance  Patient still makes urine - continue diuresis with torsemide - >1L UOP in last 24hours  Patient appears volume overloaded one exam but dialysis must be with great caution as he is clearly flow dependent in his posterior circulation     Type 2 diabetes mellitus, without long-term current use of insulin  Lab Results   Component Value Date    HGBA1C 8.3 (H) 03/21/2019     Stoke risk factor   managed by primary team   Continue basal insulin with sliding scale per primary service    Long term (current) use of anticoagulants  - continue  heparin gtt with asa 81mg     Benign prostatic hyperplasia without lower urinary tract symptoms  - continue flomax    CAD (coronary artery disease), 2V CABG 2007  Stoke risk factor   No ongoing signs/symptoms of ischemia  Continue single Aspirin 81mg and heparin gtt  Continue coreg 25 bid  Continue high intensity statin        Hyperlipidemia, baseline   Lab Results   Component Value Date    LDLCALC 46.4 (L) 07/26/2018   Stoke risk factor   Continue atorvastatin 80 mg       Essential hypertension  Ok with hypertension in the setting of flow dependent stroke symptoms   Continue carvedilol, losartan and torsemide      Carotid artery stenosis  - noted on both CTA and on ultrasound of carotids  - patient evaluated by vascular who agree with maximal medical management         Admitted to hospital medicine for unstable angina eval, vascular neurology consulted for right sided weakness, facial droop and dysarthria, MRI showed L PCA infarction, not candidate for TPA ( symptoms duration >4 hours)patient symptoms improved with laying flat, admitted to neuro critical care. Patient with known PCA infarct on L with undulating symptom pattern with modification to level of recumbency. Patient restarted on heparin gtt. Underwent US of LE and of carotid with evidence of <50% L ICA stenosis and 60-70% R ICA stenosis, heavily calcified on CTA, additionally L vertebral also heavily calcified.     STROKE DOCUMENTATION        NIH Scale:  1a. Level of Consciousness: 0-->Alert, keenly responsive  1b. LOC Questions: 0-->Answers both questions correctly  1c. LOC Commands: 0-->Performs both tasks correctly  2. Best Gaze: 0-->Normal  3. Visual: 1-->Partial hemianopia  4. Facial Palsy: 0-->Normal symmetrical movements  5a. Motor Arm, Left: 0-->No drift, limb holds 90 (or 45) degrees for full 10 secs  5b. Motor Arm, Right: 1-->Drift, limb holds 90 (or 45) degrees, but drifts down before full 10 secs, does not hit bed or other support  6a.  Motor Leg, Left: 0-->No drift, leg holds 30 degree position for full 5 secs  6b. Motor Leg, Right: 0-->No drift, leg holds 30 degree position for full 5 secs  7. Limb Ataxia: 0-->Absent  8. Sensory: 0-->Normal, no sensory loss  9. Best Language: 1-->Mild-to-moderate aphasia, some obvious loss of fluency or facility of comprehension, without significant limitation on ideas expressed or form of expression. Reduction of speech and/or comprehension, however, makes conversation. . . (see row details)  10. Dysarthria: 1-->Mild-to-moderate dysarthria, patient slurs at least some words and, at worst, can be understood with some difficulty  11. Extinction and Inattention (formerly Neglect): 0-->No abnormality  Total (NIH Stroke Scale): 4       Modified [place] Coma Scale:15   ABCD2 Score:    [id number] Score:   HAS -BLED Score:   ICH Score:   Hunt & Sharp Classification:      Hemorrhagic change of an Ischemic Stroke: Does this patient have an ischemic stroke with hemorrhagic changes? No     Neurologic Chief Complaint: right sided weakness    Subjective:     Interval History: Patient is seen for follow-up neurological assessment and treatment recommendations: see hospital course for interval history    HPI, Past Medical, Family, and Social History remains the same as documented in the initial encounter.     Review of Systems   Constitutional: Negative for chills, fatigue and fever.   HENT: Negative for postnasal drip and sore throat.    Eyes: Negative.    Respiratory: Positive for shortness of breath. Negative for chest tightness.    Cardiovascular: Negative for chest pain, palpitations and leg swelling.   Gastrointestinal: Negative for abdominal pain, blood in stool, constipation and nausea.   Endocrine: Negative.    Genitourinary: Negative for dysuria.   Musculoskeletal: Negative for arthralgias, back pain and joint swelling.   Skin: Negative.    Allergic/Immunologic: Negative.    Neurological: Positive for  dizziness, speech difficulty and weakness. Negative for light-headedness and headaches.   Psychiatric/Behavioral: Negative for confusion and dysphoric mood. The patient is not nervous/anxious and is not hyperactive.      Scheduled Meds:   albuterol-ipratropium  3 mL Nebulization Q4H    allopurinol  100 mg Oral Daily    aspirin  81 mg Oral Daily    atorvastatin  80 mg Oral Daily    carvedilol  25 mg Oral BID WM    finasteride  5 mg Oral Daily    fluticasone  2 spray Each Nare Daily    gabapentin  300 mg Oral QHS    insulin detemir U-100  12 Units Subcutaneous Daily    losartan  100 mg Oral QHS    mirtazapine  7.5 mg Oral QHS    sertraline  25 mg Oral Daily    [START ON 3/29/2019] sertraline  50 mg Oral Daily    tamsulosin  1 capsule Oral Daily    torsemide  20 mg Oral BID     Continuous Infusions:   heparin (porcine) in D5W 12 Units/kg/hr (03/25/19 1605)     PRN Meds:sodium chloride 0.9%, sodium chloride 0.9%, acetaminophen, albuterol-ipratropium, dextrose 50%, dextrose 50%, glucagon (human recombinant), glucose, glucose, heparin (PORCINE), heparin (PORCINE), insulin aspart U-100, magnesium oxide, magnesium oxide, nitroGLYCERIN, ondansetron, potassium chloride 10%, potassium chloride 10%, senna    Objective:     Vital Signs (Most Recent):  Temp: 98.1 °F (36.7 °C) (03/25/19 1505)  Pulse: 71 (03/25/19 1605)  Resp: (!) 23 (03/25/19 1605)  BP: (!) 153/80 (03/25/19 1605)  SpO2: 99 % (03/25/19 1605)  BP Location: Right arm    Vital Signs Range (Last 24H):  Temp:  [97.8 °F (36.6 °C)-98.2 °F (36.8 °C)]   Pulse:  []   Resp:  [13-41]   BP: (123-254)/()   SpO2:  [87 %-100 %]   Arterial Line BP: (136-218)/()   BP Location: Right arm    Physical Exam   Constitutional: He is oriented to person, place, and time. He appears well-developed and well-nourished. He is cooperative.  Non-toxic appearance. He does not have a sickly appearance. He does not appear ill. No distress.   HENT:   Head:  Normocephalic and atraumatic.   Eyes: Pupils are equal, round, and reactive to light.   Cardiovascular: S1 normal, S2 normal and intact distal pulses. An irregularly irregular rhythm present.   Murmur heard.  Pulses:       Radial pulses are 2+ on the right side, and 2+ on the left side.        Dorsalis pedis pulses are 2+ on the right side, and 2+ on the left side.   Pulmonary/Chest: He has decreased breath sounds. He has rales.   Abdominal: Soft.   Musculoskeletal: He exhibits no edema.   Neurological: He is alert and oriented to person, place, and time. No cranial nerve deficit or sensory deficit.   Slight facial droop on R   4/5 strength on R UE  5/5 in all other extremities   Some evidence of hemispatial neglect    Skin: Skin is warm and dry.   Nursing note and vitals reviewed.      Neurological Exam:   LOC: alert  Language: No aphasia, Other: higher level word finding difficulty  Pupils (CN II, III): PERRL    Laboratory:  Recent Results (from the past 24 hour(s))   POCT glucose    Collection Time: 03/24/19 11:49 PM   Result Value Ref Range    POCT Glucose 279 (H) 70 - 110 mg/dL   Comprehensive metabolic panel    Collection Time: 03/25/19  2:11 AM   Result Value Ref Range    Sodium 134 (L) 136 - 145 mmol/L    Potassium 4.8 3.5 - 5.1 mmol/L    Chloride 103 95 - 110 mmol/L    CO2 16 (L) 23 - 29 mmol/L    Glucose 278 (H) 70 - 110 mg/dL    BUN, Bld 115 (H) 8 - 23 mg/dL    Creatinine 5.4 (H) 0.5 - 1.4 mg/dL    Calcium 6.9 (LL) 8.7 - 10.5 mg/dL    Total Protein 5.3 (L) 6.0 - 8.4 g/dL    Albumin 3.1 (L) 3.5 - 5.2 g/dL    Total Bilirubin 0.6 0.1 - 1.0 mg/dL    Alkaline Phosphatase 88 55 - 135 U/L    AST 17 10 - 40 U/L    ALT 57 (H) 10 - 44 U/L    Anion Gap 15 8 - 16 mmol/L    eGFR if African American 10.7 (A) >60 mL/min/1.73 m^2    eGFR if non  9.3 (A) >60 mL/min/1.73 m^2   Magnesium    Collection Time: 03/25/19  2:11 AM   Result Value Ref Range    Magnesium 2.4 1.6 - 2.6 mg/dL   Phosphorus    Collection  Time: 03/25/19  2:11 AM   Result Value Ref Range    Phosphorus 7.5 (H) 2.7 - 4.5 mg/dL   Brain natriuretic peptide    Collection Time: 03/25/19  2:11 AM   Result Value Ref Range    BNP 1,146 (H) 0 - 99 pg/mL   CBC with Automated Differential    Collection Time: 03/25/19  2:12 AM   Result Value Ref Range    WBC 7.96 3.90 - 12.70 K/uL    RBC 3.39 (L) 4.60 - 6.20 M/uL    Hemoglobin 10.6 (L) 14.0 - 18.0 g/dL    Hematocrit 32.6 (L) 40.0 - 54.0 %    MCV 96 82 - 98 fL    MCH 31.3 (H) 27.0 - 31.0 pg    MCHC 32.5 32.0 - 36.0 g/dL    RDW 15.9 (H) 11.5 - 14.5 %    Platelets 97 (L) 150 - 350 K/uL    MPV 10.9 9.2 - 12.9 fL    Immature Granulocytes 1.0 (H) 0.0 - 0.5 %    Gran # (ANC) 6.8 1.8 - 7.7 K/uL    Immature Grans (Abs) 0.08 (H) 0.00 - 0.04 K/uL    Lymph # 0.7 (L) 1.0 - 4.8 K/uL    Mono # 0.4 0.3 - 1.0 K/uL    Eos # 0.0 0.0 - 0.5 K/uL    Baso # 0.01 0.00 - 0.20 K/uL    nRBC 0 0 /100 WBC    Gran% 85.9 (H) 38.0 - 73.0 %    Lymph% 8.5 (L) 18.0 - 48.0 %    Mono% 4.5 4.0 - 15.0 %    Eosinophil% 0.0 0.0 - 8.0 %    Basophil% 0.1 0.0 - 1.9 %    Differential Method Automated    POCT glucose    Collection Time: 03/25/19  7:42 AM   Result Value Ref Range    POCT Glucose 265 (H) 70 - 110 mg/dL   POCT glucose    Collection Time: 03/25/19 10:45 AM   Result Value Ref Range    POCT Glucose 158 (H) 70 - 110 mg/dL         Diagnostic Results     Brain Imaging   Under vessel imaging  Vessel Imaging   VAS US GIOVANY LEGS  Report Summary:  Impression:   Right Leg:  Color flow evaluation of the lower extremity demonstrates fully compressible and patent veins. There is no evidence of venous thrombosis in the deep or superficial veins.  Incidental finding of significant reflux noted in the CFV.    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 There is no evidence of venous thrombosis in the remaining deep veins.    VAS US Carotid Bilaterally  Report  Summary:  Impression:   RIGHT SIDE:  Velocities are suggestive of a 40-59% right ICA stenosis; however, it  visually appears to be in the 60-79% range.   Calcification of the right internal carotid artery, which denies complete visualization.   Heterogeneous plaque in the right common carotid artery.   Antegrade flow in the right vertebral artery.   LEFT SIDE:  1 - 39% left ICA stenosis.   Calcification of the left internal carotid artery.   Heterogeneous plaque in the left common carotid artery.   Retrograde flow in the left vertebral artery.    MRI 3/24 Brain Ischemic protocol   Punctate posterior left thalamic acute infarction.  No mass effect or hemorrhage.    Focal decreased signal within the proximal left PCA, suggestive of high-grade stenosis.    Moderate to high-grade narrowing of the intra cavernous/ supraclinoid ICAs bilaterally.    High-grade stenosis distal left vertebral artery.    CTA STROKE MULTIPHASE 3/24  No acute intracranial hemorrhage or mass effect.  No ischemic changes appreciated on CT.    Extensive calcified atherosclerotic disease resulting:    Left PCA with a high-grade stenosis.    High-grade (>70%) stenosis right proximal ICA and moderate (50-70%) stenosis left proximal ICA.    Moderate to high-grade stenosis of the intra cavernous/ supraclinoid ICAs bilaterally.    High-grade stenosis distal left vertebral artery.    Cardiac Imaging   TRANSTHORACIC ECHO (TTE) COMPLETE 3/23/2019  · Eccentric left ventricular hypertrophy.Normal left ventricular systolic function. The estimated ejection fraction is 55%  · Severe left atrial enlargement.  · Indeterminate left ventricular diastolic function.  · Moderate aortic valve stenosis. Aortic valve area is 1.37 cm2; peak velocity is 2.78 m/s; mean gradient is 22.39 mmHg. Mild aortic regurgitation.  · Mild-to-moderate mitral regurgitation.  · Mild to moderate tricuspid regurgitation.  · The estimated PA systolic pressure is 41 mm Hg  · Severe right  atrial enlargement.  · Normal right ventricular systolic function. The right ventricle is mildly dilated.  · Patent foramen ovale present with left to right shunting indicated by color flow Doppler.  · Normal central venous pressure (3 mm Hg).  · Pulmonary hypertension present.        Thiago Pickard MD  Comprehensive Stroke Center  Department of Vascular Neurology   [facility]

## 2019-03-25 NOTE — PLAN OF CARE
Problem: Adult Inpatient Plan of Care  Goal: Plan of Care Review  Past Medical History:  7/29/2016: NICK (acute kidney injury)  No date: Allergy  12/15/2014: Anemia, mild  No date: Arthritis      Comment:  Gout  3/27/2012: Benign essential HTN  5/10/2018: BMI 29.0-29.9,adult  No date: BPH (benign prostatic hyperplasia)  No date: BPH (benign prostatic hyperplasia)  2006: CAD (coronary artery disease)  No date: Chronic kidney disease      Comment:  due to ibuprofen  No date: Colon polyp  No date: CRF (chronic renal failure), stage 5  No date: Diverticulosis  No date: Gastritis  No date: GERD (gastroesophageal reflux disease)  No date: Gout  5/3/2018: History of colon polyps  3/27/2012: HTN (hypertension)  No date: Hyperlipidemia  No date: Hyperlipidemia  6/14/2018: LLL pneumonia  No date: LVH (left ventricular hypertrophy)  No date: Mesenteric ischemia  3/27/2012: Murmur, cardiac  No date: GRICELDA (obstructive sleep apnea)      Comment:  DOES NOT USE A MACHINE  No date: Sinus problem  No date: Syncope and collapse      Past Surgical History:  No date: APPENDECTOMY  5/31/2016: BLOCK-NERVE; Left      Comment:  Performed by Kevin Leon MD at [facility] OR  No date: CARDIAC CATHETERIZATION  2011: COLONOSCOPY  5/3/2018: COLONOSCOPY; N/A      Comment:  Performed by Messi Harris MD at [facility] ENDO  9/10/2015: COLONOSCOPY; N/A      Comment:  Performed by Messi Harris MD at [facility] ENDO  4/2007: CORONARY ARTERY BYPASS GRAFT      Comment:  x 1  8/30/2017: CYSTOSCOPY; N/A      Comment:  Performed by Rudy Herring MD at [facility] OR  11/10/2015: CYSTOSCOPY; N/A      Comment:  Performed by Rudy Herring MD at [facility] OR  1/4/2016: ESOPHAGOGASTRODUODENOSCOPY (EGD); N/A      Comment:  Performed by Tra Brooks MD at [facility] ENDO (2ND FLR)  10/15/2014: ESOPHAGOGASTRODUODENOSCOPY (EGD); N/A      Comment:  Performed by Messi Harris MD at [facility] ENDO  2/25/2016: INJECTION-STEROID-EPIDURAL-TRANSFORAMINAL; Left      Comment:  Performed by  Kevin Leon MD at [facility] OR  No date: JOINT REPLACEMENT      Comment:  left knee total replacement  X 3  No date: mid leftt finger      Comment:  from a donovantelkin  2016: MOLE REMOVAL  4/11/2016: RADIOFREQUENCY THERMOCOAGULATION (RFTC)-NERVE-MEDIAN   BRANCH-LUMBAR; Left      Comment:  Performed by Kevin Leon MD at [facility] OR  No date: rotative cuff      Comment:  no rotative cuffs on bilat shoulders has pins   No date: SHOULDER SURGERY      Comment:  shoulder surgery bilat  RIGHT X 4; LEFT X 3  11/10/2015: TRANSRECTAL ULTRASOUND GUIDED PROSTATE BIOPSY; Bilateral      Comment:  Performed by Rudy Herring MD at [facility] OR  5/31/2017: ULTRASOUND-ENDOSCOPIC-UPPER; N/A      Comment:  Performed by Tra Brooks MD at [facility] ENDO (2ND FLR)  1/4/2016: ULTRASOUND-ENDOSCOPIC-UPPER; N/A      Comment:  Performed by Tra Brooks MD at [facility] ENDO (2ND FLR)  1/16/2015: ULTRASOUND-ENDOSCOPIC-UPPER; N/A      Comment:  Performed by Jason Saleem MD at [facility] ENDO (2ND FLR)    Patient likes blankets on.    Outcome: Ongoing (interventions implemented as appropriate)  POC reviewed with pt at 0320. Pt verbalized understanding. Questions and concerns addressed. No acute events overnight. NS at 100ml/hr. Plan is for HD today. Pt progressing toward goals. Will continue to monitor. See flowsheets for full assessment and VS info

## 2019-03-25 NOTE — HPI
78 y with ESRD on HD via L BCF, HTN, HLD, CAD (s/p CABG 2007), paroxysmal AFib, PFO, CHF, asthma, and DM. Recently admitted to OSH for unstable angina, transferred here for [facility]. There was question of whether his L BC fistula was stealing from his LIMA, causing his angina. While awaiting LHC yesterday, he developed R upper and lower weakness, facial droop, and dysarthria. NIH scale 8. MRI showed L thalamic infarct. CTA showed R ICA >70% stenosis and L ICA 50-70% stenosis. He was noted to have worse symptoms while sitting or standing up, and improved symptoms while lying flat. He was on heparin gtt but developed europhia, so the heparin gtt was stopped. He currently continues to have dysarthria, but his R sided weakness and facial droop have resolved.    Pt has never had a stroke in the past. He was not taking aspirin or plavix at the time of the stroke. He was on a statin. He was started on coumadin 1 week prior to the stroke. He was moderately functional up until 6 months ago, though he did experience intermittent dizziness. In the last 6 months, his health declined significantly. His dizziness is worsened and now makes it difficult for him to walk or drive. He has unstable angina and ARMAS. He was started on HD in November 2018.

## 2019-03-25 NOTE — SUBJECTIVE & OBJECTIVE
Interval History:  >4 elements OR status of 3 inpatient conditions    Review of Systems   Constitutional: Positive for fatigue.   HENT: Negative.    Eyes: Negative.    Respiratory: Positive for shortness of breath and wheezing.    Cardiovascular: Negative.    Gastrointestinal: Positive for abdominal distention.   Endocrine: Negative.    Genitourinary: Negative.    Musculoskeletal: Positive for back pain.   Neurological: Positive for weakness and numbness.   Psychiatric/Behavioral: Negative.      2 systems OR Unable to obtain a complete ROS due to level of consciousness.  Objective:     Vitals:  Temp: 98 °F (36.7 °C)  Pulse: 71  Rhythm: atrial rhythm  BP: (!) 188/138  MAP (mmHg): 154  Resp: 15  SpO2: 99 %  O2 Device (Oxygen Therapy): room air    Temp  Min: 97.7 °F (36.5 °C)  Max: 98.2 °F (36.8 °C)  Pulse  Min: 64  Max: 102  BP  Min: 123/92  Max: 254/149  MAP (mmHg)  Min: 96  Max: 191  Resp  Min: 13  Max: 41  SpO2  Min: 87 %  Max: 100 %    03/24 0701 - 03/25 0700  In: 593.3 [I.V.:593.3]  Out: 1550 [Urine:1550]   Unmeasured Output  Urine Occurrence: 1  Stool Occurrence: 0       Physical Exam  Constitutional: Well-nourished and -developed. No apparent distress.   Eyes: Conjunctiva clear, anicteric. Lids no lesions.  Head/Ears/Nose/Mouth/Throat/Neck: Moist mucous membranes. External ears, nose atraumatic.   Cardiovascular: IrRegular rhythm. ESM  Respiratory: Comfortable respirations. Mild wheezing  Gastrointestinal:  Distended but soft and lax. +ve BS      Neurologic Examination:    -Mental Status: Alert. Oriented to person, place, and time. Speech fluent. Follows commands.   -Cranial nerves: Pupils equal, round, and reactive bilateral. Extraocular movements intact.   -Motor: RUE 4-/5, RLE 4-/5. LUE 5/5, LLE 5/5  -Sensation: Decreased sensation to the right nikita body       Medications:  Continuous  heparin (porcine) in D5W Last Rate: 12 Units/kg/hr (03/25/19 1405)   Scheduled  albuterol-ipratropium 3 mL Q4H   allopurinol  100 mg Daily   aspirin 81 mg Daily   atorvastatin 80 mg Daily   carvedilol 25 mg BID WM   finasteride 5 mg Daily   fluticasone 2 spray Daily   gabapentin 300 mg QHS   insulin detemir U-100 12 Units Daily   losartan 100 mg QHS   mirtazapine 7.5 mg QHS   sertraline 25 mg Daily   [START ON 3/29/2019] sertraline 50 mg Daily   tamsulosin 1 capsule Daily   torsemide 20 mg BID   PRN  sodium chloride 0.9%  PRN   sodium chloride 0.9%  PRN   acetaminophen 650 mg Q6H PRN   albuterol-ipratropium 3 mL Q6H PRN   dextrose 50% 12.5 g PRN   dextrose 50% 25 g PRN   glucagon (human recombinant) 1 mg PRN   glucose 16 g PRN   glucose 24 g PRN   heparin (PORCINE) 30 Units/kg (Adjusted) PRN   heparin (PORCINE) 44.2 Units/kg (Adjusted) PRN   insulin aspart U-100 1-10 Units QID (AC + HS) PRN   magnesium oxide 800 mg PRN   magnesium oxide 800 mg PRN   nitroGLYCERIN 0.4 mg Q5 Min PRN   ondansetron 8 mg Q6H PRN   potassium chloride 10% 40 mEq PRN   potassium chloride 10% 40 mEq PRN   senna 8.6 mg Daily PRN     Today I personally reviewed pertinent medications, lines/drains/airways, imaging, cardiology results, laboratory results, microbiology results, notably:    Diet  Diet diabetic [facility]; 2000 Calorie; Cardiac (Low Na/Chol); Isolation Tray - Regular [place] Diet diabetic [facility]; 2000 Calorie; Cardiac (Low Na/Chol); Isolation Tray - Regular [place]

## 2019-03-25 NOTE — ASSESSMENT & PLAN NOTE
Likely embolic, cardiac given his history of Afib and CAD    Patient is not a TPA candidate had symptoms for more than 4.5 hours which was confirmed by present of flare on MRI scan.  Symptoms improved with laying flat, likely flow depended    - keep patient flat, elevate the bed as tolerated in an attempt to graduate him, symptoms may worsen and then suggest patient be returned to supine position   - will need to continue stress test with elevation if patient tolerates     Antithrombotics for secondary stroke prevention: Antiplatelets: Aspirin: 81 mg daily    Statins for secondary stroke prevention and hyperlipidemia, if present:   Statins: Atorvastatin- 40 mg daily    Aggressive risk factor modification: HTN, DM, HLD, A-Fib, CAD     Rehab efforts: PT/OT/SLP to evaluate and treat, PM&R consult     Diagnostics ordered/pending: none    VTE prophylaxis: Heparin gtt     BP parameters: Infarct: No intervention, SBP <220

## 2019-03-25 NOTE — HPI
Mr. Hunt is a 79 yo man with hx of CAD s/p CABG (LIMA-LAD, SVG-RCA 2007), ESRD (MWF via L AVF, since fall 2018), mesenteric ischemia, ?asthma who presented on 3/18 to [facility] with acute on chronic dyspnea for the preceding 2 days. He had also been recently admitted on 3/11 with similar complaints, treated for atypical pneumonia. He was seen by his Pulmonologist; per notes, suspected mild persitent asthma + possible atypical PNA. He was admitted there, treated with steroids, nebs, and antibiotics. Additional fluid was also taken off with HD as there was pulmoanry vascular congestion on his presenting CXR. He had also noted intermittent chest burning associated with his shortness of breath/ARMAS. He continued to have wheezing refractory to treatment and there was concern for cardiac etiology of his CP as well as for subclavian steal with AVF in his LUE. He was started on a heparin drip; however it was stopped due to euphoria during the infusion. He was transferred here on 3/23 for possible unstable angina and [facility].     Here, he was admitted with plan for IC consult for [facility]. However, after admission he had an acute L PCA stroke. He was stepped up to the Neuro ICU. MRI confirmed L PCA stroke with surrounding cytotoxic edema. Findings suggestive of high grade stenosis L prox PCA, mod-high grade narrowing of intracavernous/supraclinoid ICAs, high grade stenosis of L vertebral. CTA with L PCA with high grade stenosis, >70% R proximal ICA and 50-70% L proximal ICA.     In terms of cardiac workup: TTE: EF 55%, mod AS, mild AI, mild-mod MR, CVP 3 by IVC, PASP est 41, PFO with L to R shunt. Troponin at OSH peak 0.09. EKG with rate controlled AF without ischemic changes. Last [facility] 2012 LIMA-LAD patent; SVG-RCA seen on aortogram but unable to cannulate. Recently diagnosed with AF, intermittently anticoagulated as noted above, prev started on coumadin. Last SPECT 6/2018 in our system with fixed defects in anterior and  inferior walls.    Here, he denies current chest pain. Reports some mild shortness of breath today. Last episode of the burning chest discomfort yesterday per patient today. Reports SOB/chest discomfort intermittently for about 6 months, unsure if symptoms improved or worsened after HD initiation. Mentation waxing/waning, improved with lying flat. R sided strength improving.

## 2019-03-25 NOTE — ASSESSMENT & PLAN NOTE
-Appears mildly volume overloaded by CXR, elevated BNP, lung exam  -Recommend additional fluid removal via HD if tolerated + gentle IV diuresis

## 2019-03-25 NOTE — SUBJECTIVE & OBJECTIVE
Interval History:   Patient evaluated at bedside, no significant event overnight.     Review of patient's allergies indicates:   Allergen Reactions    Ace inhibitors Other (See Comments)     Cough    Arb-angiotensin receptor antagonist Itching    Eplerenone Other (See Comments)     Marked bradycardia, 40, tiredness and weakness      Sulfa (sulfonamide antibiotics) Itching     Patient says this was 10 years ago and doesn't remember what happened     Current Facility-Administered Medications   Medication Frequency    0.9%  NaCl infusion PRN    0.9%  NaCl infusion PRN    acetaminophen tablet 650 mg Q6H PRN    albuterol-ipratropium 2.5 mg-0.5 mg/3 mL nebulizer solution 3 mL Q4H    albuterol-ipratropium 2.5 mg-0.5 mg/3 mL nebulizer solution 3 mL Q6H PRN    allopurinol tablet 100 mg Daily    aspirin EC tablet 81 mg Daily    atorvastatin tablet 80 mg Daily    azithromycin tablet 250 mg Daily    carvedilol tablet 25 mg BID WM    dextrose 50% injection 12.5 g PRN    dextrose 50% injection 25 g PRN    finasteride tablet 5 mg Daily    fluticasone 50 mcg/actuation nasal spray 100 mcg Daily    gabapentin capsule 300 mg QHS    glucagon (human recombinant) injection 1 mg PRN    glucose chewable tablet 16 g PRN    glucose chewable tablet 24 g PRN    insulin aspart U-100 pen 1-10 Units QID (AC + HS) PRN    insulin detemir U-100 pen 12 Units Daily    losartan tablet 100 mg QHS    magnesium oxide tablet 800 mg PRN    magnesium oxide tablet 800 mg PRN    mirtazapine tablet 7.5 mg QHS    nitroGLYCERIN SL tablet 0.4 mg Q5 Min PRN    ondansetron tablet 8 mg Q6H PRN    pantoprazole EC tablet 40 mg Daily    potassium chloride 10% oral solution 40 mEq PRN    potassium chloride 10% oral solution 40 mEq PRN    predniSONE tablet 40 mg Daily    senna tablet 8.6 mg Daily PRN    sertraline tablet 25 mg Daily    [START ON 3/29/2019] sertraline tablet 50 mg Daily    tamsulosin 24 hr capsule 0.4 mg Daily     torsemide tablet 20 mg BID       Objective:     Vital Signs (Most Recent):  Temp: 98.2 °F (36.8 °C) (03/25/19 0705)  Pulse: 72 (03/25/19 0905)  Resp: 18 (03/25/19 0905)  BP: (!) 123/92 (03/25/19 0905)  SpO2: 99 % (03/25/19 0905)  O2 Device (Oxygen Therapy): room air (03/25/19 0820) Vital Signs (24h Range):  Temp:  [97.7 °F (36.5 °C)-99 °F (37.2 °C)] 98.2 °F (36.8 °C)  Pulse:  [] 72  Resp:  [13-41] 18  SpO2:  [87 %-100 %] 99 %  BP: (104-254)/() 123/92  Arterial Line BP: (136-218)/() 157/71     Weight: 106.6 kg (235 lb 0.2 oz) (03/24/19 0600)  Body mass index is 31.01 kg/m².  Body surface area is 2.34 meters squared.    I/O last 3 completed shifts:  In: 803.3 [P.O.:210; I.V.:593.3]  Out: 2275 [Urine:2275]    Physical Exam   Constitutional: No distress.   HENT:   Head: Normocephalic.   Right Ear: External ear normal.   Eyes: Right eye exhibits no discharge. Left eye exhibits no discharge.   Cardiovascular: An irregular rhythm present.   Pulmonary/Chest: Effort normal. No respiratory distress.   Abdominal: Soft.   Neurological: He is alert.   Skin: Skin is warm.       Significant Labs:  ABGs:   Recent Labs   Lab 03/24/19  1419   PH 7.397   PCO2 31.7*   HCO3 19.5*   POCSATURATED 97   BE -5     BMP:   Recent Labs   Lab 03/25/19 0211   *      CO2 16*   *   CREATININE 5.4*   CALCIUM 6.9*   MG 2.4     CBC:   Recent Labs   Lab 03/25/19 0212   WBC 7.96   RBC 3.39*   HGB 10.6*   HCT 32.6*   PLT 97*   MCV 96   MCH 31.3*   MCHC 32.5     CMP:   Recent Labs   Lab 03/25/19  0211   *   CALCIUM 6.9*   ALBUMIN 3.1*   PROT 5.3*   *   K 4.8   CO2 16*      *   CREATININE 5.4*   ALKPHOS 88   ALT 57*   AST 17   BILITOT 0.6     All labs within the past 24 hours have been reviewed.

## 2019-03-25 NOTE — ASSESSMENT & PLAN NOTE
Ok with hypertension in the setting of flow dependent stroke symptoms   Continue carvedilol, losartan and torsemide

## 2019-03-25 NOTE — ASSESSMENT & PLAN NOTE
Lab Results   Component Value Date    LDLCALC 46.4 (L) 07/26/2018   Stoke risk factor   Continue atorvastatin 80 mg

## 2019-03-25 NOTE — PROGRESS NOTES
[facility]  Neurocritical Care  Progress Note    Admit Date: 3/22/2019  Service Date: 03/25/2019  Length of Stay: 3    Subjective:     Chief Complaint: Acute ischemic left PCA stroke    History of Present Illness: Patient is a 79 yo male with PMH HTN, AFIB, CAD s/p CABG 2007, HFpEF, HLD, DM, ESRD, and Asthma transferred initially for evaluation of unstable angina until becoming symptomatic with R side weakness and dysarthria. Patient was admitted to Hospital Medicine 3/22 for evaluation of unstable angina. On 3/24 @ 1000, the patient's daughter came to visit and she noticed him lethargic, disoriented which soon progressed to include facial droop and dysarthria. A sroke code was called in response to new onset R weakness and dysarthria. MRI was completed noting Acute L PCA infarct. Per Vascular Neuro staff, the symptoms improved while supine during imaging. The patient was subsequently transferred to [facility] for further management. The patient does have a history of coumadin and ASA use. During admission he was placed on a heparin drip for Afib but it was quickly discontinued after the patient began to experience feeling of euphoria which were resolved with discontinuation of heparin gtt.              Hospital Course: 3/25: NAEO.     Interval History:  >4 elements OR status of 3 inpatient conditions    Review of Systems   Constitutional: Positive for fatigue.   HENT: Negative.    Eyes: Negative.    Respiratory: Positive for shortness of breath and wheezing.    Cardiovascular: Negative.    Gastrointestinal: Positive for abdominal distention.   Endocrine: Negative.    Genitourinary: Negative.    Musculoskeletal: Positive for back pain.   Neurological: Positive for weakness and numbness.   Psychiatric/Behavioral: Negative.      2 systems OR Unable to obtain a complete ROS due to level of consciousness.  Objective:     Vitals:  Temp: 98 °F (36.7 °C)  Pulse: 71  Rhythm: atrial rhythm  BP: (!) 188/138  MAP  (mmHg): 154  Resp: 15  SpO2: 99 %  O2 Device (Oxygen Therapy): room air    Temp  Min: 97.7 °F (36.5 °C)  Max: 98.2 °F (36.8 °C)  Pulse  Min: 64  Max: 102  BP  Min: 123/92  Max: 254/149  MAP (mmHg)  Min: 96  Max: 191  Resp  Min: 13  Max: 41  SpO2  Min: 87 %  Max: 100 %    03/24 0701 - 03/25 0700  In: 593.3 [I.V.:593.3]  Out: 1550 [Urine:1550]   Unmeasured Output  Urine Occurrence: 1  Stool Occurrence: 0       Physical Exam  Constitutional: Well-nourished and -developed. No apparent distress.   Eyes: Conjunctiva clear, anicteric. Lids no lesions.  Head/Ears/Nose/Mouth/Throat/Neck: Moist mucous membranes. External ears, nose atraumatic.   Cardiovascular: IrRegular rhythm. ESM  Respiratory: Comfortable respirations. Mild wheezing  Gastrointestinal:  Distended but soft and lax. +ve BS      Neurologic Examination:    -Mental Status: Alert. Oriented to person, place, and time. Speech fluent. Follows commands.   -Cranial nerves: Pupils equal, round, and reactive bilateral. Extraocular movements intact.   -Motor: RUE 4-/5, RLE 4-/5. LUE 5/5, LLE 5/5  -Sensation: Decreased sensation to the right nikita body       Medications:  Continuous  heparin (porcine) in D5W Last Rate: 12 Units/kg/hr (03/25/19 1405)   Scheduled  albuterol-ipratropium 3 mL Q4H   allopurinol 100 mg Daily   aspirin 81 mg Daily   atorvastatin 80 mg Daily   carvedilol 25 mg BID WM   finasteride 5 mg Daily   fluticasone 2 spray Daily   gabapentin 300 mg QHS   insulin detemir U-100 12 Units Daily   losartan 100 mg QHS   mirtazapine 7.5 mg QHS   sertraline 25 mg Daily   [START ON 3/29/2019] sertraline 50 mg Daily   tamsulosin 1 capsule Daily   torsemide 20 mg BID   PRN  sodium chloride 0.9%  PRN   sodium chloride 0.9%  PRN   acetaminophen 650 mg Q6H PRN   albuterol-ipratropium 3 mL Q6H PRN   dextrose 50% 12.5 g PRN   dextrose 50% 25 g PRN   glucagon (human recombinant) 1 mg PRN   glucose 16 g PRN   glucose 24 g PRN   heparin (PORCINE) 30 Units/kg (Adjusted) PRN    heparin (PORCINE) 44.2 Units/kg (Adjusted) PRN   insulin aspart U-100 1-10 Units QID (AC + HS) PRN   magnesium oxide 800 mg PRN   magnesium oxide 800 mg PRN   nitroGLYCERIN 0.4 mg Q5 Min PRN   ondansetron 8 mg Q6H PRN   potassium chloride 10% 40 mEq PRN   potassium chloride 10% 40 mEq PRN   senna 8.6 mg Daily PRN     Today I personally reviewed pertinent medications, lines/drains/airways, imaging, cardiology results, laboratory results, microbiology results, notably:    Diet  Diet diabetic [facility]; 2000 Calorie; Cardiac (Low Na/Chol); Isolation Tray [facility]  Diet diabetic [facility]; 2000 Calorie; Cardiac (Low Na/Chol); Isolation Tray - Regular [place]        Assessment/Plan:     Neuro  * Acute ischemic left PCA stroke  Admit to [facility]; No LVO; No tPA  Vascular Neurology Consult  CTA  MRI  Maintain -220  HOB flat  NPO      Cardiac/Vascular  Paroxysmal atrial fibrillation  Rate controlled  Restart heparin gtt        CAD (coronary artery disease), 2V CABG 2007  Hx CAD/ CABG 2007  Continue ASA  Continue statin      Essential hypertension  Permissive HTN      Carotid artery stenosis  - [facility] vascular surgery recs  - Continue medical managment    Hematology  Long term (current) use of anticoagulants  Restart heparin gtt      Endocrine  Type 2 diabetes mellitus, without long-term current use of insulin  Continue current regimen     Other  SOB (shortness of breath)  Currently on RA          The patient is being Prophylaxed for:  Venous Thromboembolism with: Mechanical or Chemical  Stress Ulcer with: Not Applicable   Ventilator Pneumonia with: not applicable    Activity Orders          Diet diabetic [facility]; 2000 Calorie; Cardiac (Low Na/Chol); Isolation Tray - Regular [place]: Diabetic starting at 03/25 1005        Full Code   Uninterrupted Critical Care/Counseling Time (not including procedures): 35 minutes       Augusto Stevens MD  Neurocritical Care  [facility]

## 2019-03-25 NOTE — SUBJECTIVE & OBJECTIVE
Past Medical History:   Diagnosis Date    NICK (acute kidney injury) 7/29/2016    Allergy     Anemia, mild 12/15/2014    Arthritis     Gout    Benign essential HTN 3/27/2012    BMI 29.0-29.9,adult 5/10/2018    BPH (benign prostatic hyperplasia)     BPH (benign prostatic hyperplasia)     CAD (coronary artery disease) 2006    Chronic kidney disease     due to ibuprofen    Colon polyp     CRF (chronic renal failure), stage 5     Diverticulosis     Gastritis     GERD (gastroesophageal reflux disease)     Gout     History of colon polyps 5/3/2018    HTN (hypertension) 3/27/2012    Hyperlipidemia     Hyperlipidemia     LLL pneumonia 6/14/2018    LVH (left ventricular hypertrophy)     Mesenteric ischemia     Murmur, cardiac 3/27/2012    GRICELDA (obstructive sleep apnea)     DOES NOT USE A MACHINE    Sinus problem     Syncope and collapse        Past Surgical History:   Procedure Laterality Date    APPENDECTOMY      BLOCK-NERVE Left 5/31/2016    [facility]    CARDIAC CATHETERIZATION      COLONOSCOPY  2011    COLONOSCOPY N/A 5/3/2018    [facility] at [facility] ENDO    COLONOSCOPY N/A 9/10/2015    [facility] at [facility]    CORONARY ARTERY BYPASS GRAFT  4/2007    x 1    CYSTOSCOPY N/A 8/30/2017    [facility]    CYSTOSCOPY N/A 11/10/2015    [facility] at [facility] OR    ESOPHAGOGASTRODUODENOSCOPY (EGD) N/A 1/4/2016    Performed by Tra [facility] at [facility] ENDO (2ND FLR)    ESOPHAGOGASTRODUODENOSCOPY (EGD) N/A 10/15/2014    Performed by [facility] MD at [facility] ENDO    INJECTION-STEROID-EPIDURAL-TRANSFORAMINAL Left 2/25/2016    [facility] OR    JOINT REPLACEMENT      left knee total replacement  X 3    mid leftt finger      from a cactuss    MOLE REMOVAL  2016    RADIOFREQUENCY THERMOCOAGULATION (RFTC)-NERVE-MEDIAN BRANCH-LUMBAR Left  4/11/2016    [facility] at [facility] OR    rotative cuff      no rotative cuffs on bilat shoulders has pins     SHOULDER SURGERY      shoulder surgery bilat  RIGHT X 4; LEFT X 3    TRANSRECTAL ULTRASOUND GUIDED PROSTATE BIOPSY Bilateral 11/10/2015    [facility] OR    ULTRASOUND-ENDOSCOPIC-UPPER N/A 5/31/2017    Performed by [facility] at [facility] ENDO (2ND FLR)    ULTRASOUND-ENDOSCOPIC-UPPER N/A 1/4/2016    Performed by Tra [facility] at [facility] ENDO (2ND FLR)    ULTRASOUND-ENDOSCOPIC-UPPER N/A 1/16/2015    Performed by Jason Saleem MD at [facility] ENDO (2ND FLR)       Review of patient's allergies indicates:   Allergen Reactions    Ace inhibitors Other (See Comments)     Cough    Arb-angiotensin receptor antagonist Itching    Eplerenone Other (See Comments)     Marked bradycardia, 40, tiredness and weakness      Sulfa (sulfonamide antibiotics) Itching     Patient says this was 10 years ago and doesn't remember what happened       Family History     Problem Relation (Age of Onset)    Cancer Father    Heart disease Mother, Sister    Hypertension Brother    Pneumonia Sister    Stroke Sister    Sudden death Father        Tobacco Use    Smoking status: Never Smoker    Smokeless tobacco: Never Used   Substance and Sexual Activity    Alcohol use: No    Drug use: No    Sexual activity: Not on file         Review of Systems   Unable to perform ROS: Acuity of condition     Objective:     Vital Signs (Most Recent):  Temp: 98.2 °F (36.8 °C) (03/25/19 0705)  Pulse: 71 (03/25/19 0705)  Resp: 15 (03/25/19 0705)  BP: (!) 152/70 (03/25/19 0705)  SpO2: 100 % (03/25/19 0705) Vital Signs (24h Range):  Temp:  [97.7 °F (36.5 °C)-99 °F (37.2 °C)] 98.2 °F (36.8 °C)  Pulse:  [] 71  Resp:  [13-41] 15  SpO2:  [87 %-100 %] 100 %  BP: (104-254)/() 152/70  Arterial Line BP: (136-218)/() 161/74     Weight: 106.6 kg (235 lb 0.2 oz)  Body mass index is 31.01  kg/m².      Intake/Output Summary (Last 24 hours) at 3/25/2019 0815  Last data filed at 3/25/2019 0600  Gross per 24 hour   Intake 593.33 ml   Output 1550 ml   Net -956.67 ml       Physical Exam   Constitutional: He is oriented to person, place, and time. He appears well-developed and well-nourished.   HENT:   Head: Normocephalic and atraumatic.   Cardiovascular: Normal rate and regular rhythm.   Pulmonary/Chest: Effort normal. No respiratory distress.   98% on room air  Minimal rales in bases   Abdominal: Soft. Bowel sounds are normal.   Musculoskeletal: He exhibits edema.   +Presacral edema   Neurological: He is alert and oriented to person, place, and time.   Dysarthric   Skin: Skin is warm and dry.   Nursing note and vitals reviewed.      Vents:       Lines/Drains/Airways     Drain                 Hemodialysis AV Fistula Left upper arm -- days          Arterial Line                 Arterial Line Right Radial -- days          Peripheral Intravenous Line                 Peripheral IV - Single Lumen 03/24/19 1800 Right Antecubital less than 1 day         Peripheral IV - Single Lumen 03/25/19 0400 Right Forearm less than 1 day                Significant Labs:    CBC/Anemia Profile:  Recent Labs   Lab 03/24/19  0606 03/25/19  0212   WBC 7.66  7.66 7.96   HGB 10.7*  10.7* 10.6*   HCT 31.4*  31.4* 32.6*   PLT 92*  92* 97*   MCV 94  94 96   RDW 15.5*  15.5* 15.9*        Chemistries:  Recent Labs   Lab 03/23/19  1926 03/24/19  0606 03/25/19  0211   NA  --  134* 134*   K  --  4.8 4.8   CL  --  101 103   CO2  --  22* 16*   BUN  --  100* 115*   CREATININE  --  5.0* 5.4*   CALCIUM  --  6.8* 6.9*   ALBUMIN 2.9*  --  3.1*   PROT  --   --  5.3*   BILITOT  --   --  0.6   ALKPHOS  --   --  88   ALT  --   --  57*   AST  --   --  17   MG  --   --  2.4   PHOS 5.9*  --  7.5*       All pertinent labs within the past 24 hours have been reviewed.    Significant Imaging:   I have reviewed all pertinent imaging results/findings  within the past 24 hours.

## 2019-03-25 NOTE — ASSESSMENT & PLAN NOTE
78 y M with ESRD and significant cardiac comorbidities presents with L thalamic stroke. On duplex L ICA 1-39%, R ICA 60-79%. Given his L hemispheric stroke, he has asymptomatic R ICA stenosis. He has multiple possible other etiologies for his stroke, including afib, DVT with PFO, and intracavernous carotid stenosis. In light of his significant co morbidities, especially his ESRD, and his asymptomatic stenosis, recommend against surgical intervention. Recommend maximal medical therapy instead, including ASA, plavix, and statin if OK with primary. He should also be on anticoagulation for his afib.   - no intervention needed for chronic occlusive L peroneal DVT  - follow up with outside vascular surgeon for fistula care  - maximal medical therapy: ASA, plavix, statin  - anticoagulation for afib  - recommend against surgery for asyptomatic R ICA stenosis in setting of ESRD and unstable angina

## 2019-03-25 NOTE — ASSESSMENT & PLAN NOTE
Lab Results   Component Value Date    HGBA1C 8.3 (H) 03/21/2019     Stoke risk factor   managed by primary team   Continue basal insulin with sliding scale per primary service

## 2019-03-25 NOTE — PLAN OF CARE
03/25/19 0809   Discharge Reassessment   Assessment Type Discharge Planning Reassessment   Do you have any problems affording any of your prescribed medications? TBD   Discharge Plan A Home;Home Health   Discharge Plan B Home with family;Home Health   Galindo DAVIES

## 2019-03-25 NOTE — PLAN OF CARE
Problem: Adult Inpatient Plan of Care  Goal: Plan of Care Review  Past Medical History:  7/29/2016: NICK (acute kidney injury)  No date: Allergy  12/15/2014: Anemia, mild  No date: Arthritis      Comment:  Gout  3/27/2012: Benign essential HTN  5/10/2018: BMI 29.0-29.9,adult  No date: BPH (benign prostatic hyperplasia)  No date: BPH (benign prostatic hyperplasia)  2006: CAD (coronary artery disease)  No date: Chronic kidney disease      Comment:  due to ibuprofen  No date: Colon polyp  No date: CRF (chronic renal failure), stage 5  No date: Diverticulosis  No date: Gastritis  No date: GERD (gastroesophageal reflux disease)  No date: Gout  5/3/2018: History of colon polyps  3/27/2012: HTN (hypertension)  No date: Hyperlipidemia  No date: Hyperlipidemia  6/14/2018: LLL pneumonia  No date: LVH (left ventricular hypertrophy)  No date: Mesenteric ischemia  3/27/2012: Murmur, cardiac  No date: GRICELDA (obstructive sleep apnea)      Comment:  DOES NOT USE A MACHINE  No date: Sinus problem  No date: Syncope and collapse      Past Surgical History:  No date: APPENDECTOMY  5/31/2016: BLOCK-NERVE; Left      Comment:  Performed by Kevin Leon MD at [facility] OR  No date: CARDIAC CATHETERIZATION  2011: COLONOSCOPY  5/3/2018: COLONOSCOPY; N/A      Comment:  Performed by Messi Harris MD at [facility] ENDO  9/10/2015: COLONOSCOPY; N/A      Comment:  Performed by Messi Harris MD at [facility] ENDO  4/2007: CORONARY ARTERY BYPASS GRAFT      Comment:  x 1  8/30/2017: CYSTOSCOPY; N/A      Comment:  Performed by Rudy Herring MD at [facility] OR  11/10/2015: CYSTOSCOPY; N/A      Comment:  Performed by Rudy Herring MD at [facility] OR  1/4/2016: ESOPHAGOGASTRODUODENOSCOPY (EGD); N/A      Comment:  Performed by Tra Brooks MD at [facility] ENDO (2ND FLR)  10/15/2014: ESOPHAGOGASTRODUODENOSCOPY (EGD); N/A      Comment:  Performed by Messi Harris MD at [facility] ENDO  2/25/2016: INJECTION-STEROID-EPIDURAL-TRANSFORAMINAL; Left      Comment:  Performed by  Kevin Leon MD at [facility] OR  No date: JOINT REPLACEMENT      Comment:  left knee total replacement  X 3  No date: mid leftt finger      Comment:  from a donovantelkin  2016: MOLE REMOVAL  4/11/2016: RADIOFREQUENCY THERMOCOAGULATION (RFTC)-NERVE-MEDIAN   BRANCH-LUMBAR; Left      Comment:  Performed by Kevin Leon MD at [facility] OR  No date: rotative cuff      Comment:  no rotative cuffs on bilat shoulders has pins   No date: SHOULDER SURGERY      Comment:  shoulder surgery bilat  RIGHT X 4; LEFT X 3  11/10/2015: TRANSRECTAL ULTRASOUND GUIDED PROSTATE BIOPSY; Bilateral      Comment:  Performed by [facility]ring MD at [facility] OR  5/31/2017: ULTRASOUND-ENDOSCOPIC-UPPER; N/A      Comment:  Performed by Tra Brooks MD at [facility] ENDO (2ND FLR)  1/4/2016: ULTRASOUND-ENDOSCOPIC-UPPER; N/A      Comment:  Performed by Tra Brooks MD at [facility] ENDO (2ND FLR)  1/16/2015: ULTRASOUND-ENDOSCOPIC-UPPER; N/A      Comment:  Performed by Jason Saleem MD at [facility] ENDO (2ND FLR)    Patient likes blankets on.    POC reviewed with pt and family at 1400. Pt and family verbalized understanding. Questions and concerns addressed. No acute events today. Pt progressing toward goals. Heparin gtt started at 12 units/kg/hr. Systolic <200. Will continue to monitor. See flowsheets for full assessment and VS info.

## 2019-03-25 NOTE — CONSULTS
[facility]  Vascular Surgery  Consult Note    Inpatient consult to Vascular Surgery  Consult performed by: Seema Dickson MD  Consult ordered by: Ethan Costello MD        Subjective:     Chief Complaint/Reason for Admission: bilateral carotid stenosis, stroke    History of Present Illness: 78 y with ESRD on HD via L BCF, HTN, HLD, CAD (s/p CABG 2007), paroxysmal AFib, PFO, CHF, asthma, and DM. Recently admitted to OSH for unstable angina, transferred here for C. There was question of whether his L BC fistula was stealing from his LIMA, causing his angina. While awaiting LHC yesterday, he developed R upper and lower weakness, facial droop, and dysarthria. NIH scale 8. MRI showed L thalamic infarct. CTA showed R ICA >70% stenosis and L ICA 50-70% stenosis. He was noted to have worse symptoms while sitting or standing up, and improved symptoms while lying flat. He was on heparin gtt but developed europhia, so the heparin gtt was stopped. He currently continues to have dysarthria, but his R sided weakness and facial droop have resolved.    Pt has never had a stroke in the past. He was not taking aspirin or plavix at the time of the stroke. He was on a statin. He was started on coumadin 1 week prior to the stroke. He was moderately functional up until 6 months ago, though he did experience intermittent dizziness. In the last 6 months, his health declined significantly. His dizziness is worsened and now makes it difficult for him to walk or drive. He has unstable angina and ARMAS. He was started on HD in November 2018.       Facility-Administered Medications Prior to Admission   Medication Dose Route Frequency Provider Last Rate Last Dose    albuterol nebulizer solution 1.25 mg  1.25 mg Nebulization Q6H PRN Telma Powers NP   1.25 mg at 02/26/19 1134     Medications Prior to Admission   Medication Sig Dispense Refill Last Dose    albuterol (PROVENTIL/VENTOLIN HFA) 90 mcg/actuation inhaler  2 puffs every 4 hours as needed for cough, wheeze, or shortness of breath 3 Inhaler 3 3/22/2019 at 0400    allopurinol (ZYLOPRIM) 100 MG tablet TAKE 1 TABLET BY MOUTH EVERY DAY 90 tablet 1 3/22/2019 at 0900    aspirin (ECOTRIN) 81 MG EC tablet Take 81 mg by mouth once daily.     3/22/2019 at 100    atorvastatin (LIPITOR) 80 MG tablet TAKE 1 TABLET (80 MG TOTAL) BY MOUTH ONCE DAILY. 90 tablet 3 3/22/2019 at 1000    budesonide-formoterol 160-4.5 mcg (SYMBICORT) 160-4.5 mcg/actuation HFAA Inhale 2 puffs into the lungs every 12 (twelve) hours. Controller 3 Inhaler 4 3/22/2019 at Unknown time    carvedilol (COREG) 6.25 MG tablet Take 1 tablet (6.25 mg total) by mouth 2 (two) times daily with meals. 60 tablet 3 3/22/2019 at Unknown time    celecoxib (CELEBREX) 200 MG capsule Take 1 capsule (200 mg total) by mouth once daily. 30 capsule 3 3/22/2019 at Unknown time    finasteride (PROSCAR) 5 mg tablet TAKE 1 TABLET ONCE DAILY. 90 tablet 3 3/22/2019 at Unknown time    gabapentin (NEURONTIN) 300 MG capsule Take 1 capsule (300 mg total) by mouth every evening. 90 capsule 3 3/22/2019 at Unknown time    isosorbide mononitrate (ISMO,MONOKET) 10 mg tablet TAKE 1 TABLET BY MOUTH EVERY DAY IN THE EVENING 30 tablet 3 3/22/2019 at Unknown time    losartan (COZAAR) 100 MG tablet TAKE 1 TABLET BY MOUTH EVERY DAY 90 tablet 0 3/22/2019 at Unknown time    mirtazapine (REMERON) 7.5 MG Tab TAKE 1 TABLET BY MOUTH EVERY EVENING. 30 tablet 2 3/21/2019 at Unknown time    omeprazole (PRILOSEC) 20 MG capsule TAKE 1 CAPSULE BY MOUTH EVERY DAY 30 capsule 1 3/22/2019 at Unknown time    predniSONE (DELTASONE) 20 MG tablet 3 pills for 3 days, 2 pills for 3 days, 1 pill for 3 days, repeat if needed. 36 tablet 0 3/22/2019 at Unknown time    tamsulosin (FLOMAX) 0.4 mg Cap TAKE 1 CAPSULE BY MOUTH EVERY DAY 30 capsule 2 3/22/2019 at Unknown time    torsemide (DEMADEX) 20 MG Tab Take 1 tablet (20 mg total) by mouth 2 (two) times daily. 60  tablet 1 3/22/2019 at Unknown time    albuterol-ipratropium (DUO-NEB) 2.5 mg-0.5 mg/3 mL nebulizer solution Take 3 mLs by nebulization every 6 (six) hours as needed for Wheezing or Shortness of Breath. 270 mL 0 3/10/2019 at Unknown time    fluticasone (FLONASE) 50 mcg/actuation nasal spray 2 sprays (100 mcg total) by Each Nare route once daily. 3 Bottle 3 3/10/2019 at Unknown time    meclizine (ANTIVERT) 25 mg tablet TAKE 1 TABLET BY MOUTH THREE TIMES A DAY AS NEEDED 90 tablet 0 Past Week at Unknown time    meclizine (ANTIVERT) 25 mg tablet Take 1 tablet (25 mg total) by mouth 3 (three) times daily as needed for Dizziness. 30 tablet 0 Past Week at Unknown time    NITROSTAT 0.4 mg SL tablet PLACE 1 TABLET (0.4 MG TOTAL) UNDER THE TONGUE EVERY 5 (FIVE) MINUTES AS NEEDED FOR CHEST PAIN. 25 tablet 3 Unknown at Unknown time    sodium chloride (SALINE NASAL MIST) 3 % Mist 1 spray by Nasal route 2 (two) times daily.   Past Month at Unknown time    tiotropium bromide (SPIRIVA RESPIMAT) 1.25 mcg/actuation Mist Inhale 2.5 mcg into the lungs once daily. Controller 4 g 5 Unknown at Unknown time    warfarin (COUMADIN) 7.5 MG tablet Take 1 tablet (7.5 mg total) by mouth Daily. 30 tablet 1 Unknown at Unknown time    zolpidem (AMBIEN) 5 MG Tab TAKE 1 TABLET BY MOUTH EVERY EVENING AS NEEDED 30 tablet 3 Past Week at Unknown time    zolpidem (AMBIEN) 5 MG Tab TAKE 1 TABLET BY MOUTH EVERY EVENING AS NEEDED 30 tablet 3 Past Week at Unknown time       Review of patient's allergies indicates:   Allergen Reactions    Ace inhibitors Other (See Comments)     Cough    Arb-angiotensin receptor antagonist Itching    Eplerenone Other (See Comments)     Marked bradycardia, 40, tiredness and weakness      Sulfa (sulfonamide antibiotics) Itching     Patient says this was 10 years ago and doesn't remember what happened       Past Medical History:   Diagnosis Date    NICK (acute kidney injury) 7/29/2016    Allergy     Anemia, mild  12/15/2014    Arthritis     Gout    Benign essential HTN 3/27/2012    BMI 29.0-29.9,adult 5/10/2018    BPH (benign prostatic hyperplasia)     BPH (benign prostatic hyperplasia)     CAD (coronary artery disease) 2006    Chronic kidney disease     due to ibuprofen    Colon polyp     CRF (chronic renal failure), stage 5     Diverticulosis     Gastritis     GERD (gastroesophageal reflux disease)     Gout     History of colon polyps 5/3/2018    HTN (hypertension) 3/27/2012    Hyperlipidemia     Hyperlipidemia     LLL pneumonia 6/14/2018    LVH (left ventricular hypertrophy)     Mesenteric ischemia     Murmur, cardiac 3/27/2012    GRICELDA (obstructive sleep apnea)     DOES NOT USE A MACHINE    Sinus problem     Syncope and collapse      Past Surgical History:   Procedure Laterality Date    APPENDECTOMY      BLOCK-NERVE Left 5/31/2016    [facility] OR    CARDIAC CATHETERIZATION      COLONOSCOPY  2011    COLONOSCOPY N/A 5/3/2018    Performed [facility] ENDO    COLONOSCOPY N/A 9/10/2015    [facility]formed [facility] ENDO    CORONARY ARTERY BYPASS GRAFT  4/2007    x 1    CYSTOSCOPY N/A 8/30/2017    [facility] at [facility] OR    CYSTOSCOPY N/A 11/10/2015    [facility] by [facility] at [facility] OR    ESOPHAGOGASTRODUODENOSCOPY (EGD) N/A 1/4/2016    Performed [facility] at [facility] ENDO (2ND FLR)    ESOPHAGOGASTRODUODENOSCOPY (EGD) N/A 10/15/2014    Performed by Messi [facility] MD at [facility] ENDO    INJECTION-STEROID-EPIDURAL-TRANSFORAMINAL Left 2/25/2016    [facility] at [facility] OR    JOINT REPLACEMENT      left knee total replacement  X 3    mid leftt finger      from a cactuss    MOLE REMOVAL  2016    RADIOFREQUENCY THERMOCOAGULATION (RFTC)-NERVE-MEDIAN BRANCH-LUMBAR Left 4/11/2016    [facility] OR    rotative cuff      no rotative cuffs on bilat shoulders has  pins     SHOULDER SURGERY      shoulder surgery bilat  RIGHT X 4; LEFT X 3    TRANSRECTAL ULTRASOUND GUIDED PROSTATE BIOPSY Bilateral 11/10/2015    [facility] OR    ULTRASOUND-ENDOSCOPIC-UPPER N/A 5/31/2017    [facility] at [facility] ENDO (2ND FLR)    ULTRASOUND-ENDOSCOPIC-UPPER N/A 1/4/2016    Performed by Tra [facility] at [facility] ENDO (2ND FLR)    ULTRASOUND-ENDOSCOPIC-UPPER N/A 1/16/2015    Performed by Jason Saleem MD at [facility] ENDO (2ND FLR)     Family History     Problem Relation (Age of Onset)    Cancer Father    Heart disease Mother, Sister    Hypertension Brother    Pneumonia Sister    Stroke Sister    Sudden death Father        Tobacco Use    Smoking status: Never Smoker    Smokeless tobacco: Never Used   Substance and Sexual Activity    Alcohol use: No    Drug use: No    Sexual activity: Not on file     Review of Systems   All other systems reviewed and are negative.    Objective:     Vital Signs (Most Recent):  Temp: 98 °F (36.7 °C) (03/25/19 1105)  Pulse: 67 (03/25/19 1205)  Resp: (!) 25 (03/25/19 1205)  BP: (!) 147/103 (03/25/19 1205)  SpO2: 96 % (03/25/19 1205) Vital Signs (24h Range):  Temp:  [97.7 °F (36.5 °C)-98.2 °F (36.8 °C)] 98 °F (36.7 °C)  Pulse:  [] 67  Resp:  [13-41] 25  SpO2:  [87 %-100 %] 96 %  BP: (123-254)/() 147/103  Arterial Line BP: (136-218)/() 160/76     Weight: 106.6 kg (235 lb 0.2 oz)  Body mass index is 31.01 kg/m².    Physical Exam   Constitutional: He appears well-developed and well-nourished.   HENT:   Head: Normocephalic and atraumatic.   Eyes: Conjunctivae and EOM are normal.   Neck: Normal range of motion. Neck supple.   Pulmonary/Chest: Effort normal. No respiratory distress.   Abdominal: Soft. He exhibits no distension.   Musculoskeletal:   RUE 5/5  RLE 5/5  LUE 5/5  L brachiocephalic fistula strong thrill proximally, somewhat pulsatile thrill distally  LLE 5/5   Neurological:   Moderate  dyarthria  No facial droop       Significant Labs:  CBC:   Recent Labs   Lab 03/25/19  0212   WBC 7.96   RBC 3.39*   HGB 10.6*   HCT 32.6*   PLT 97*   MCV 96   MCH 31.3*   MCHC 32.5     CMP:   Recent Labs   Lab 03/25/19  0211   *   CALCIUM 6.9*   ALBUMIN 3.1*   PROT 5.3*   *   K 4.8   CO2 16*      *   CREATININE 5.4*   ALKPHOS 88   ALT 57*   AST 17   BILITOT 0.6       Significant Diagnostics:  I have reviewed all pertinent imaging results/findings within the past 24 hours.   CTA L ICA 50-70%, R ICA >70% stenosis  Carotid duplex L ICA 1-39%, R ICA 60-79%  BLE venous duplex chronic occlusive thrombus L peroneal    Assessment/Plan:     Carotid artery stenosis  78 y M with ESRD and significant cardiac comorbidities presents with L thalamic stroke. On duplex L ICA 1-39%, R ICA 60-79%. He has minimal L ICA stenosis of his symptomatic side, and asymptomatic R ICA stenosis. He has multiple possible other etiologies for his stroke, including afib, DVT with PFO, and intracavernous carotid stenosis. In light of his significant co morbidities, especially his ESRD, and his minimal stenosis, recommend against surgical intervention. Recommend maximal medical therapy instead, including ASA, plavix, and statin if OK with primary. He should also be on anticoagulation for his afib.   - maximal medical therapy: ASA, plavix, statin  - anticoagulation for afib  - recommend against surgery for symptomatic L ICA with minimal stenosis in setting of ESRD and unstable angina        Thank you for your consult. I will sign off. Please contact us if you have any additional questions.    Seema Dickson MD  Vascular Surgery  [facility]

## 2019-03-25 NOTE — CONSULTS
[facility]  Interventional Cardiology  Consult Note    Patient Name: Micheal Hunt  MRN: [id number]  Admission Date: 3/22/2019  Hospital Length of Stay: 3 days  Code Status: Full Code   Attending Provider: Jean Paul Watson MD  Consulting Provider: Indio Galeas MD  Primary Care Physician: Pk Lakhani MD  Principal Problem:Acute ischemic left PCA stroke    Patient information was obtained from patient and past medical records.     Inpatient consult to Interventional Cardiology  Consult performed by: Indio Galeas MD  Consult ordered by: Jeana Stark MD        Subjective:     Chief Complaint:  Shortness of breath     HPI:  Mr. Hunt is a 77 yo man with hx of CAD s/p CABG (LIMA-LAD, SVG-RCA 2007), ESRD (MWF via L AVF, since fall 2018), mesenteric ischemia, ?asthma who presented on 3/18 to [facility] with acute on chronic dyspnea for the preceding 2 days. He had also been recently admitted on 3/11 with similar complaints, treated for atypical pneumonia. He was seen by his Pulmonologist; per notes, suspected mild persitent asthma + possible atypical PNA. He was admitted there, treated with steroids, nebs, and antibiotics. Additional fluid was also taken off with HD as there was pulmoanry vascular congestion on his presenting CXR. He had also noted intermittent chest burning associated with his shortness of breath/ARMAS. He continued to have wheezing refractory to treatment and there was concern for cardiac etiology of his CP as well as for subclavian steal with AVF in his LUE. He was started on a heparin drip; however it was stopped due to euphoria during the infusion. He was transferred here on 3/23 for possible unstable angina and LHC.     Here, he was admitted with plan for IC consult for LHC. However, after admission he had an acute L PCA stroke. He was stepped up to the Neuro ICU. MRI confirmed L PCA stroke with surrounding cytotoxic edema. Findings suggestive of high grade  stenosis L prox PCA, mod-high grade narrowing of intracavernous/supraclinoid ICAs, high grade stenosis of L vertebral. CTA with L PCA with high grade stenosis, >70% R proximal ICA and 50-70% L proximal ICA.     In terms of cardiac workup: TTE: EF 55%, mod AS, mild AI, mild-mod MR, CVP 3 by IVC, PASP est 41, PFO with L to R shunt. Troponin at OSH peak 0.09. EKG with rate controlled AF without ischemic changes. Last [facility] 2012 LIMA-LAD patent; SVG-RCA seen on aortogram but unable to cannulate. Recently diagnosed with AF, intermittently anticoagulated as noted above, prev started on coumadin. Last SPECT 6/2018 in our system with fixed defects in anterior and inferior walls.    Here, he denies current chest pain. Reports some mild shortness of breath today. Last episode of the burning chest discomfort yesterday per patient today. Reports SOB/chest discomfort intermittently for about 6 months, unsure if symptoms improved or worsened after HD initiation. Mentation waxing/waning, improved with lying flat. R sided strength improving.     Past Medical History:   Diagnosis Date    NICK (acute kidney injury) 7/29/2016    Allergy     Anemia, mild 12/15/2014    Arthritis     Gout    Benign essential HTN 3/27/2012    BMI 29.0-29.9,adult 5/10/2018    BPH (benign prostatic hyperplasia)     BPH (benign prostatic hyperplasia)     CAD (coronary artery disease) 2006    Chronic kidney disease     due to ibuprofen    Colon polyp     CRF (chronic renal failure), stage 5     Diverticulosis     Gastritis     GERD (gastroesophageal reflux disease)     Gout     History of colon polyps 5/3/2018    HTN (hypertension) 3/27/2012    Hyperlipidemia     Hyperlipidemia     LLL pneumonia 6/14/2018    LVH (left ventricular hypertrophy)     Mesenteric ischemia     Murmur, cardiac 3/27/2012    GRICELDA (obstructive sleep apnea)     DOES NOT USE A MACHINE    Sinus problem     Syncope and collapse        Past Surgical History:    Procedure Laterality Date    APPENDECTOMY      BLOCK-NERVE Left 5/31/2016    [facility]    CARDIAC CATHETERIZATION      COLONOSCOPY  2011    COLONOSCOPY N/A 5/3/2018    [facility] at [facility] ENDO    COLONOSCOPY N/A 9/10/2015    Performed by [facility] at [facility] ENDO    CORONARY ARTERY BYPASS GRAFT  4/2007    x 1    CYSTOSCOPY N/A 8/30/2017    Per[facility] by [facility]y [facility]ring [facility] at [facility] OR    CYSTOSCOPY N/A 11/10/2015    Performed by [facility] at [facility] OR    ESOPHAGOGASTRODUODENOSCOPY (EGD) N/A 1/4/2016    Performed by Tra Brooks [facility] at [facility] (2ND FLR)    ESOPHAGOGASTRODUODENOSCOPY (EGD) N/A 10/15/2014    Performed by [facility] at [facility] ENDO    INJECTION-STEROID-EPIDURAL-TRANSFORAMINAL Left 2/25/2016    Per[facility] by [facility] at [facility] OR    JOINT REPLACEMENT      left knee total replacement  X 3    mid leftt finger      from a cactuss    MOLE REMOVAL  2016    RADIOFREQUENCY THERMOCOAGULATION (RFTC)-NERVE-MEDIAN BRANCH-LUMBAR Left 4/11/2016    Per[facility] by [facility] at [facility] OR    rotative cuff      no rotative cuffs on bilat shoulders has pins     SHOULDER SURGERY      shoulder surgery bilat  RIGHT X 4; LEFT X 3    TRANSRECTAL ULTRASOUND GUIDED PROSTATE BIOPSY Bilateral 11/10/2015    Performed by [facility] at [facility] OR    ULTRASOUND-ENDOSCOPIC-UPPER N/A 5/31/2017    Performed by Tra Brooks [facility] at [facility] ENDO (2ND FLR)    ULTRASOUND-ENDOSCOPIC-UPPER N/A 1/4/2016    Performed by Tra Brooks MD at [facility] ENDO (2ND FLR)    ULTRASOUND-ENDOSCOPIC-UPPER N/A 1/16/2015    Performed by Jason Saleem MD at [facility] (2ND FLR)       Review of patient's allergies indicates:   Allergen Reactions    Ace inhibitors Other (See Comments)     Cough    Arb-angiotensin receptor antagonist Itching    Eplerenone Other (See Comments)     Marked bradycardia, 40, tiredness and weakness      Sulfa (sulfonamide  antibiotics) Itching     Patient says this was 10 years ago and doesn't remember what happened       Facility-Administered Medications Prior to Admission   Medication    albuterol nebulizer solution 1.25 mg     PTA Medications   Medication Sig    albuterol (PROVENTIL/VENTOLIN HFA) 90 mcg/actuation inhaler 2 puffs every 4 hours as needed for cough, wheeze, or shortness of breath    allopurinol (ZYLOPRIM) 100 MG tablet TAKE 1 TABLET BY MOUTH EVERY DAY    aspirin (ECOTRIN) 81 MG EC tablet Take 81 mg by mouth once daily.      atorvastatin (LIPITOR) 80 MG tablet TAKE 1 TABLET (80 MG TOTAL) BY MOUTH ONCE DAILY.    budesonide-formoterol 160-4.5 mcg (SYMBICORT) 160-4.5 mcg/actuation HFAA Inhale 2 puffs into the lungs every 12 (twelve) hours. Controller    carvedilol (COREG) 6.25 MG tablet Take 1 tablet (6.25 mg total) by mouth 2 (two) times daily with meals.    celecoxib (CELEBREX) 200 MG capsule Take 1 capsule (200 mg total) by mouth once daily.    finasteride (PROSCAR) 5 mg tablet TAKE 1 TABLET ONCE DAILY.    gabapentin (NEURONTIN) 300 MG capsule Take 1 capsule (300 mg total) by mouth every evening.    isosorbide mononitrate (ISMO,MONOKET) 10 mg tablet TAKE 1 TABLET BY MOUTH EVERY DAY IN THE EVENING    losartan (COZAAR) 100 MG tablet TAKE 1 TABLET BY MOUTH EVERY DAY    mirtazapine (REMERON) 7.5 MG Tab TAKE 1 TABLET BY MOUTH EVERY EVENING.    omeprazole (PRILOSEC) 20 MG capsule TAKE 1 CAPSULE BY MOUTH EVERY DAY    predniSONE (DELTASONE) 20 MG tablet 3 pills for 3 days, 2 pills for 3 days, 1 pill for 3 days, repeat if needed.    tamsulosin (FLOMAX) 0.4 mg Cap TAKE 1 CAPSULE BY MOUTH EVERY DAY    torsemide (DEMADEX) 20 MG Tab Take 1 tablet (20 mg total) by mouth 2 (two) times daily.    albuterol-ipratropium (DUO-NEB) 2.5 mg-0.5 mg/3 mL nebulizer solution Take 3 mLs by nebulization every 6 (six) hours as needed for Wheezing or Shortness of Breath.    fluticasone (FLONASE) 50 mcg/actuation nasal spray 2  sprays (100 mcg total) by Each Nare route once daily.    meclizine (ANTIVERT) 25 mg tablet TAKE 1 TABLET BY MOUTH THREE TIMES A DAY AS NEEDED    meclizine (ANTIVERT) 25 mg tablet Take 1 tablet (25 mg total) by mouth 3 (three) times daily as needed for Dizziness.    NITROSTAT 0.4 mg SL tablet PLACE 1 TABLET (0.4 MG TOTAL) UNDER THE TONGUE EVERY 5 (FIVE) MINUTES AS NEEDED FOR CHEST PAIN.    sodium chloride (SALINE NASAL MIST) 3 % Mist 1 spray by Nasal route 2 (two) times daily.    tiotropium bromide (SPIRIVA RESPIMAT) 1.25 mcg/actuation Mist Inhale 2.5 mcg into the lungs once daily. Controller    warfarin (COUMADIN) 7.5 MG tablet Take 1 tablet (7.5 mg total) by mouth Daily.    zolpidem (AMBIEN) 5 MG Tab TAKE 1 TABLET BY MOUTH EVERY EVENING AS NEEDED    zolpidem (AMBIEN) 5 MG Tab TAKE 1 TABLET BY MOUTH EVERY EVENING AS NEEDED     Family History     Problem Relation (Age of Onset)    Cancer Father    Heart disease Mother, Sister    Hypertension Brother    Pneumonia Sister    Stroke Sister    Sudden death Father        Tobacco Use    Smoking status: Never Smoker    Smokeless tobacco: Never Used   Substance and Sexual Activity    Alcohol use: No    Drug use: No    Sexual activity: Not on file     Review of Systems   All other systems reviewed and are negative.    Objective:     Vital Signs (Most Recent):  Temp: 98 °F (36.7 °C) (03/25/19 1105)  Pulse: 67 (03/25/19 1205)  Resp: (!) 25 (03/25/19 1205)  BP: (!) 147/103 (03/25/19 1205)  SpO2: 96 % (03/25/19 1205) Vital Signs (24h Range):  Temp:  [97.7 °F (36.5 °C)-98.2 °F (36.8 °C)] 98 °F (36.7 °C)  Pulse:  [] 67  Resp:  [13-41] 25  SpO2:  [87 %-100 %] 96 %  BP: (123-254)/() 147/103  Arterial Line BP: (136-218)/() 160/76     Weight: 106.6 kg (235 lb 0.2 oz)  Body mass index is 31.01 kg/m².    SpO2: 96 %  O2 Device (Oxygen Therapy): room air      Intake/Output Summary (Last 24 hours) at 3/25/2019 1259  Last data filed at 3/25/2019 1205  Gross per  24 hour   Intake 618.33 ml   Output 2150 ml   Net -1531.67 ml       Lines/Drains/Airways     Drain                 Hemodialysis AV Fistula Left upper arm -- days          Arterial Line                 Arterial Line Right Radial -- days          Peripheral Intravenous Line                 Peripheral IV - Single Lumen 03/24/19 1800 Right Antecubital less than 1 day         Peripheral IV - Single Lumen 03/25/19 0400 Right Forearm less than 1 day                Physical Exam   Constitutional: No distress.   HENT:   Head: Atraumatic.   Mouth/Throat: No oropharyngeal exudate.   Eyes: EOM are normal. No scleral icterus.   Neck: Neck supple. No JVD present.   Cardiovascular: Normal rate. An irregularly irregular rhythm present. Exam reveals no gallop and no friction rub.   Murmur (2/6 DEANA) heard.  LUE AVF   Pulmonary/Chest: Effort normal. No respiratory distress. He has no wheezes. He exhibits no tenderness.   Mild rales in bases, mildly coarse throughout   Abdominal: Soft. Bowel sounds are normal. He exhibits no distension. There is no tenderness.   Musculoskeletal: He exhibits no edema.   Neurological: He is alert. No cranial nerve deficit.   Decreased short term recall, easily re-oriented. R hemiparesis 4/5   Skin: Skin is warm and dry. No erythema.   Psychiatric: He has a normal mood and affect.       Significant Labs:   BMP:   Recent Labs   Lab 03/24/19  0606 03/25/19  0211   * 278*   * 134*   K 4.8 4.8    103   CO2 22* 16*   * 115*   CREATININE 5.0* 5.4*   CALCIUM 6.8* 6.9*   MG  --  2.4   , CMP   Recent Labs   Lab 03/23/19  1926 03/24/19  0606 03/25/19  0211   NA  --  134* 134*   K  --  4.8 4.8   CL  --  101 103   CO2  --  22* 16*   GLU  --  181* 278*   BUN  --  100* 115*   CREATININE  --  5.0* 5.4*   CALCIUM  --  6.8* 6.9*   PROT  --   --  5.3*   ALBUMIN 2.9*  --  3.1*   BILITOT  --   --  0.6   ALKPHOS  --   --  88   AST  --   --  17   ALT  --   --  57*   ANIONGAP  --  11 15   ESTGFRAFRICA   --  11.8* 10.7*   EGFRNONAA  --  10.2* 9.3*   , CBC   Recent Labs   Lab 03/24/19  0606 03/25/19  0212   WBC 7.66  7.66 7.96   HGB 10.7*  10.7* 10.6*   HCT 31.4*  31.4* 32.6*   PLT 92*  92* 97*   , INR   Recent Labs   Lab 03/24/19  0606   INR 1.2   , Lipid Panel No results for input(s): CHOL, HDL, LDLCALC, TRIG, CHOLHDL in the last 48 hours. and Troponin No results for input(s): TROPONINI in the last 48 hours.    Significant Imaging: Echocardiogram:   2D echo with color flow doppler:   Results for orders placed or performed in visit on 04/28/17   2D echo with color flow doppler   Result Value Ref Range    QEF 57 55 - 65    Mitral Valve Regurgitation MILD     Aortic Valve Regurgitation MILD     Est. PA Systolic Pressure 25.2     Tricuspid Valve Regurgitation TRIVIAL     and Transthoracic echo (TTE) complete (Cupid Only):   Results for orders placed or performed during the hospital encounter of 03/22/19   Transthoracic echo (TTE) complete (Cupid Only)   Result Value Ref Range    Ascending aorta 4.28 cm    STJ 3.13 cm    AV mean gradient 22.39 mmHg    Ao peak delio 2.78 m/s    Ao VTI 59.13 cm    IVRT 0.05 msec    IVS 1.07 0.6 - 1.1 cm    LA size 5.00 cm    Left Atrium Major Axis 7.00 cm    Left Atrium Minor Axis 7.00 cm    LVIDD 6.38 (A) 3.5 - 6.0 cm    LVIDS 4.62 (A) 2.1 - 4.0 cm    LVOT diameter 2.35 cm    LVOT peak VTI 18.65 cm    PW 1.08 0.6 - 1.1 cm    MV Peak A Delio 0.32 m/s    E wave decelartion time 172.51 msec    MV Peak E Delio 0.96 m/s    RA Major Axis 7.24 cm    RA Width 4.91 cm    RVDD 4.49 cm    Sinus 3.58 cm    TAPSE 1.03 cm    TR Max Delio 3.10 m/s    TDI LATERAL 0.10     TDI SEPTAL 0.05     LA WIDTH 5.00 cm    LV Diastolic Volume 207.33 mL    LV Systolic Volume 98.48 mL    RV S' 8.15 m/s    LVOT peak delio [id number] m/s    LV LATERAL E/E' RATIO 9.60     LV SEPTAL E/E' RATIO 19.20     FS 28 %    LA volume 148.75 cm3    LV mass 300.92 g    Left Ventricle Relative Wall Thickness 0.34 cm    AV valve area  1.37 cm2    AV Velocity Ratio 0.31     AV index (prosthetic) 0.32     E/A ratio 3.00     Mean e' 0.08     LVOT area 4.34 cm2    LVOT stroke volume 80.85 cm3    AV peak gradient 30.91 mmHg    E/E' ratio 12.80     LV Systolic Volume Index 42.7 mL/m2    LV Diastolic Volume Index 89.97 mL/m2    LA Volume Index 64.5 mL/m2    LV Mass Index 130.6 g/m2    Triscuspid Valve Regurgitation Peak Gradient 38.44 mmHg    BSA 2.34 m2    Right Atrial Pressure (from IVC) 3 mmHg    TV rest pulmonary artery pressure 41 mmHg     Assessment and Plan:     * Acute ischemic left PCA stroke  -Management as per Vascular Neuro and   -Likely etiology atherosclerotic disease cerebral vessels. Also with carotid stenosis (R >70, L50-70%), pAF (recent dx, intermittently anticoagulated), PFO.  -Agree with venous U/S  -Agree with need for oral anticoagulation given pAF with very elevated [id number]    NSTEMI (non-ST elevated myocardial infarction)  -Tn elevated to 0.09 during admit, no EKG changes/WMA  - Unclear type I vs type II in setting of heart failure, pulmonary issues (?asthma, pna) for which he was being treated  - EF intact on echo 55%, no WMA  - No ongoing signs/symptoms of ischemia  - Recommend DAPT (ASA + plavix) if safe from a neurologic perspective; given elevated bleeding risk, single antiplatelet + anticoagulation also reasonable  - Continue coreg 25 bid, target resting HR 60  - Continue high intensity statin  - PRN nitro if CP, can add long-acting if recurrent    - Recommend medical management at this time given acute stroke; ideally would wait at least one month prior to reconsidering  - Please notify if symptoms concerning for new ischemia which would potentially necessitate urgent catheterization      ESRD (end stage renal disease) on dialysis  -Appears mildly volume overloaded by CXR, elevated BNP, lung exam  -Recommend additional fluid removal via HD if tolerated + gentle IV diuresis    Paroxysmal atrial fibrillation  -Elevated  [id number]  -Recommend heparin gtt, transition to oral anticoagulation prior to discharge    Patent foramen ovale  - Agree with DVT U/S as well as AC    Carotid artery stenosis  - 50-70% on L (ipsilateral to CVA) and >70% on right  - If of potential benefit from Neuro perspective, would consider carotid stenting if benefits outweigh risks (in particular periprocedural stroke)       Thank you for your consult. Seen and discussed with Dr. Higginbotham. Discussed recommendations and findings as above with Mr. Hunt and his daughters who were present with him today.    Indio Galeas MD  Interventional Cardiology   [facility]

## 2019-03-25 NOTE — ASSESSMENT & PLAN NOTE
- 50-70% on L (ipsilateral to CVA) and >70% on right  - If of potential benefit from Neuro perspective, would consider carotid stenting if benefits outweigh risks (in particular periprocedural stroke)

## 2019-03-25 NOTE — ASSESSMENT & PLAN NOTE
Continue scheduled HD   Patient is being followed by nephrology, appreciate their assistance  Patient still makes urine - continue diuresis with torsemide - >1L UOP in last 24hours  Patient appears volume overloaded one exam but dialysis must be with great caution as he is clearly flow dependent in his posterior circulation

## 2019-03-25 NOTE — ASSESSMENT & PLAN NOTE
- noted on both CTA and on ultrasound of carotids  - patient evaluated by vascular who agree with maximal medical management

## 2019-03-25 NOTE — SUBJECTIVE & OBJECTIVE
Past Medical History:   Diagnosis Date    NICK (acute kidney injury) 7/29/2016    Allergy     Anemia, mild 12/15/2014    Arthritis     Gout    Benign essential HTN 3/27/2012    BMI 29.0-29.9,adult 5/10/2018    BPH (benign prostatic hyperplasia)     BPH (benign prostatic hyperplasia)     CAD (coronary artery disease) 2006    Chronic kidney disease     due to ibuprofen    Colon polyp     CRF (chronic renal failure), stage 5     Diverticulosis     Gastritis     GERD (gastroesophageal reflux disease)     Gout     History of colon polyps 5/3/2018    HTN (hypertension) 3/27/2012    Hyperlipidemia     Hyperlipidemia     LLL pneumonia 6/14/2018    LVH (left ventricular hypertrophy)     Mesenteric ischemia     Murmur, cardiac 3/27/2012    GRICELDA (obstructive sleep apnea)     DOES NOT USE A MACHINE    Sinus problem     Syncope and collapse        Past Surgical History:   Procedure Laterality Date    APPENDECTOMY      BLOCK-NERVE Left 5/31/2016    [facility]    CARDIAC CATHETERIZATION      COLONOSCOPY  2011    COLONOSCOPY N/A 5/3/2018    [facility]    COLONOSCOPY N/A 9/10/2015    [facility]    CORONARY ARTERY BYPASS GRAFT  4/2007    x 1    CYSTOSCOPY N/A 8/30/2017    [facility]    CYSTOSCOPY N/A 11/10/2015    [facility] at [facility]    ESOPHAGOGASTRODUODENOSCOPY (EGD) N/A 1/4/2016    [facility] Tra [facility] at [facility] (2ND FLR)    ESOPHAGOGASTRODUODENOSCOPY (EGD) N/A 10/15/2014    [facility] at [facility] END[facility]    INJECTION-STEROID-EPIDURAL-TRANSFORAMINAL Left 2/25/2016    [facility]    JOINT REPLACEMENT      left knee total replacement  X 3    mid leftt finger      from a cactuss    MOLE REMOVAL  2016    RADIOFREQUENCY THERMOCOAGULATION (RFTC)-NERVE-MEDIAN BRANCH-LUMBAR Left  4/11/2016    [facility]    rotative cuff      no rotative cuffs on bilat shoulders has pins     SHOULDER SURGERY      shoulder surgery bilat  RIGHT X 4; LEFT X 3    TRANSRECTAL ULTRASOUND GUIDED PROSTATE BIOPSY Bilateral 11/10/2015    [facility] OR    ULTRASOUND-ENDOSCOPIC-UPPER N/A 5/31/2017    [facility] at [facility] END[facility] (2ND FLR)    ULTRASOUND-ENDOSCOPIC-UPPER N/A 1/4/2016    [facility] Tra [facility] at [facility] ENDO (2ND FLR)    ULTRASOUND-ENDOSCOPIC-UPPER N/A 1/16/2015    Performed by Jason Saleem MD at [facility] ENDO (2ND FLR)       Review of patient's allergies indicates:   Allergen Reactions    Ace inhibitors Other (See Comments)     Cough    Arb-angiotensin receptor antagonist Itching    Eplerenone Other (See Comments)     Marked bradycardia, 40, tiredness and weakness      Sulfa (sulfonamide antibiotics) Itching     Patient says this was 10 years ago and doesn't remember what happened       Facility-Administered Medications Prior to Admission   Medication    albuterol nebulizer solution 1.25 mg     PTA Medications   Medication Sig    albuterol (PROVENTIL/VENTOLIN HFA) 90 mcg/actuation inhaler 2 puffs every 4 hours as needed for cough, wheeze, or shortness of breath    allopurinol (ZYLOPRIM) 100 MG tablet TAKE 1 TABLET BY MOUTH EVERY DAY    aspirin (ECOTRIN) 81 MG EC tablet Take 81 mg by mouth once daily.      atorvastatin (LIPITOR) 80 MG tablet TAKE 1 TABLET (80 MG TOTAL) BY MOUTH ONCE DAILY.    budesonide-formoterol 160-4.5 mcg (SYMBICORT) 160-4.5 mcg/actuation HFAA Inhale 2 puffs into the lungs every 12 (twelve) hours. Controller    carvedilol (COREG) 6.25 MG tablet Take 1 tablet (6.25 mg total) by mouth 2 (two) times daily with meals.    celecoxib (CELEBREX) 200 MG capsule Take 1 capsule (200 mg total) by mouth once daily.    finasteride (PROSCAR) 5 mg tablet TAKE 1 TABLET ONCE DAILY.    gabapentin  (NEURONTIN) 300 MG capsule Take 1 capsule (300 mg total) by mouth every evening.    isosorbide mononitrate (ISMO,MONOKET) 10 mg tablet TAKE 1 TABLET BY MOUTH EVERY DAY IN THE EVENING    losartan (COZAAR) 100 MG tablet TAKE 1 TABLET BY MOUTH EVERY DAY    mirtazapine (REMERON) 7.5 MG Tab TAKE 1 TABLET BY MOUTH EVERY EVENING.    omeprazole (PRILOSEC) 20 MG capsule TAKE 1 CAPSULE BY MOUTH EVERY DAY    predniSONE (DELTASONE) 20 MG tablet 3 pills for 3 days, 2 pills for 3 days, 1 pill for 3 days, repeat if needed.    tamsulosin (FLOMAX) 0.4 mg Cap TAKE 1 CAPSULE BY MOUTH EVERY DAY    torsemide (DEMADEX) 20 MG Tab Take 1 tablet (20 mg total) by mouth 2 (two) times daily.    albuterol-ipratropium (DUO-NEB) 2.5 mg-0.5 mg/3 mL nebulizer solution Take 3 mLs by nebulization every 6 (six) hours as needed for Wheezing or Shortness of Breath.    fluticasone (FLONASE) 50 mcg/actuation nasal spray 2 sprays (100 mcg total) by Each Nare route once daily.    meclizine (ANTIVERT) 25 mg tablet TAKE 1 TABLET BY MOUTH THREE TIMES A DAY AS NEEDED    meclizine (ANTIVERT) 25 mg tablet Take 1 tablet (25 mg total) by mouth 3 (three) times daily as needed for Dizziness.    NITROSTAT 0.4 mg SL tablet PLACE 1 TABLET (0.4 MG TOTAL) UNDER THE TONGUE EVERY 5 (FIVE) MINUTES AS NEEDED FOR CHEST PAIN.    sodium chloride (SALINE NASAL MIST) 3 % Mist 1 spray by Nasal route 2 (two) times daily.    tiotropium bromide (SPIRIVA RESPIMAT) 1.25 mcg/actuation Mist Inhale 2.5 mcg into the lungs once daily. Controller    warfarin (COUMADIN) 7.5 MG tablet Take 1 tablet (7.5 mg total) by mouth Daily.    zolpidem (AMBIEN) 5 MG Tab TAKE 1 TABLET BY MOUTH EVERY EVENING AS NEEDED    zolpidem (AMBIEN) 5 MG Tab TAKE 1 TABLET BY MOUTH EVERY EVENING AS NEEDED     Family History     Problem Relation (Age of Onset)    Cancer Father    Heart disease Mother, Sister    Hypertension Brother    Pneumonia Sister    Stroke Sister    Sudden death Father         Tobacco Use    Smoking status: Never Smoker    Smokeless tobacco: Never Used   Substance and Sexual Activity    Alcohol use: No    Drug use: No    Sexual activity: Not on file     Review of Systems   All other systems reviewed and are negative.    Objective:     Vital Signs (Most Recent):  Temp: 98 °F (36.7 °C) (03/25/19 1105)  Pulse: 67 (03/25/19 1205)  Resp: (!) 25 (03/25/19 1205)  BP: (!) 147/103 (03/25/19 1205)  SpO2: 96 % (03/25/19 1205) Vital Signs (24h Range):  Temp:  [97.7 °F (36.5 °C)-98.2 °F (36.8 °C)] 98 °F (36.7 °C)  Pulse:  [] 67  Resp:  [13-41] 25  SpO2:  [87 %-100 %] 96 %  BP: (123-254)/() 147/103  Arterial Line BP: (136-218)/() 160/76     Weight: 106.6 kg (235 lb 0.2 oz)  Body mass index is 31.01 kg/m².    SpO2: 96 %  O2 Device (Oxygen Therapy): room air      Intake/Output Summary (Last 24 hours) at 3/25/2019 1259  Last data filed at 3/25/2019 1205  Gross per 24 hour   Intake 618.33 ml   Output 2150 ml   Net -1531.67 ml       Lines/Drains/Airways     Drain                 Hemodialysis AV Fistula Left upper arm -- days          Arterial Line                 Arterial Line Right Radial -- days          Peripheral Intravenous Line                 Peripheral IV - Single Lumen 03/24/19 1800 Right Antecubital less than 1 day         Peripheral IV - Single Lumen 03/25/19 0400 Right Forearm less than 1 day                Physical Exam   Constitutional: No distress.   HENT:   Head: Atraumatic.   Mouth/Throat: No oropharyngeal exudate.   Eyes: EOM are normal. No scleral icterus.   Neck: Neck supple. No JVD present.   Cardiovascular: Normal rate. An irregularly irregular rhythm present. Exam reveals no gallop and no friction rub.   Murmur (2/6 DEANA) heard.  LUE AVF   Pulmonary/Chest: Effort normal. No respiratory distress. He has no wheezes. He exhibits no tenderness.   Mild rales in bases, mildly coarse throughout   Abdominal: Soft. Bowel sounds are normal. He exhibits no distension.  There is no tenderness.   Musculoskeletal: He exhibits no edema.   Neurological: He is alert. No cranial nerve deficit.   Decreased short term recall, easily re-oriented. R hemiparesis 4/5   Skin: Skin is warm and dry. No erythema.   Psychiatric: He has a normal mood and affect.       Significant Labs:   BMP:   Recent Labs   Lab 03/24/19  0606 03/25/19  0211   * 278*   * 134*   K 4.8 4.8    103   CO2 22* 16*   * 115*   CREATININE 5.0* 5.4*   CALCIUM 6.8* 6.9*   MG  --  2.4   , CMP   Recent Labs   Lab 03/23/19  1926 03/24/19  0606 03/25/19  0211   NA  --  134* 134*   K  --  4.8 4.8   CL  --  101 103   CO2  --  22* 16*   GLU  --  181* 278*   BUN  --  100* 115*   CREATININE  --  5.0* 5.4*   CALCIUM  --  6.8* 6.9*   PROT  --   --  5.3*   ALBUMIN 2.9*  --  3.1*   BILITOT  --   --  0.6   ALKPHOS  --   --  88   AST  --   --  17   ALT  --   --  57*   ANIONGAP  --  11 15   ESTGFRAFRICA  --  11.8* 10.7*   EGFRNONAA  --  10.2* 9.3*   , CBC   Recent Labs   Lab 03/24/19  0606 03/25/19  0212   WBC 7.66  7.66 7.96   HGB 10.7*  10.7* 10.6*   HCT 31.4*  31.4* 32.6*   PLT 92*  92* 97*   , INR   Recent Labs   Lab 03/24/19  0606   INR 1.2   , Lipid Panel No results for input(s): CHOL, HDL, LDLCALC, TRIG, CHOLHDL in the last 48 hours. and Troponin No results for input(s): TROPONINI in the last 48 hours.    Significant Imaging: Echocardiogram:   2D echo with color flow doppler:   Results for orders placed or performed in visit on 04/28/17   2D echo with color flow doppler   Result Value Ref Range    QEF 57 55 - 65    Mitral Valve Regurgitation MILD     Aortic Valve Regurgitation MILD     Est. PA Systolic Pressure 25.2     Tricuspid Valve Regurgitation TRIVIAL     and Transthoracic echo (TTE) complete (Cupid Only):   Results for orders placed or performed during the hospital encounter of 03/22/19   Transthoracic echo (TTE) complete (Cupid Only)   Result Value Ref Range    Ascending aorta 4.28 cm    STJ 3.13  cm    AV mean gradient 22.39 mmHg    Ao peak delio 2.78 m/s    Ao VTI 59.13 cm    IVRT 0.05 msec    IVS 1.07 0.6 - 1.1 cm    LA size 5.00 cm    Left Atrium Major Axis 7.00 cm    Left Atrium Minor Axis 7.00 cm    LVIDD 6.38 (A) 3.5 - 6.0 cm    LVIDS 4.62 (A) 2.1 - 4.0 cm    LVOT diameter 2.35 cm    LVOT peak VTI 18.65 cm    PW 1.08 0.6 - 1.1 cm    MV Peak A Delio 0.32 m/s    E wave decelartion time 172.51 msec    MV Peak E Delio 0.96 m/s    RA Major Axis 7.24 cm    RA Width 4.91 cm    RVDD 4.49 cm    Sinus 3.58 cm    TAPSE 1.03 cm    TR Max Delio 3.10 m/s    TDI LATERAL 0.10     TDI SEPTAL 0.05     LA WIDTH 5.00 cm    LV Diastolic Volume 207.33 mL    LV Systolic Volume 98.48 mL    RV S' 8.15 m/s    LVOT peak delio [id number] m/s    LV LATERAL E/E' RATIO 9.60     LV SEPTAL E/E' RATIO 19.20     FS 28 %    LA volume 148.75 cm3    LV mass 300.92 g    Left Ventricle Relative Wall Thickness 0.34 cm    AV valve area 1.37 cm2    AV Velocity Ratio 0.31     AV index (prosthetic) 0.32     E/A ratio 3.00     Mean e' 0.08     LVOT area 4.34 cm2    LVOT stroke volume 80.85 cm3    AV peak gradient 30.91 mmHg    E/E' ratio 12.80     LV Systolic Volume Index 42.7 mL/m2    LV Diastolic Volume Index 89.97 mL/m2    LA Volume Index 64.5 mL/m2    LV Mass Index 130.6 g/m2    Triscuspid Valve Regurgitation Peak Gradient 38.44 mmHg    BSA 2.34 m2    Right Atrial Pressure (from IVC) 3 mmHg    TV rest pulmonary artery pressure 41 mmHg

## 2019-03-26 PROBLEM — I63.332 THROMBOTIC STROKE INVOLVING LEFT POSTERIOR CEREBRAL ARTERY: Status: ACTIVE | Noted: 2019-03-24

## 2019-03-26 LAB
ALBUMIN SERPL BCP-MCNC: 3 G/DL (ref 3.5–5.2)
ALP SERPL-CCNC: 92 U/L (ref 55–135)
ALT SERPL W/O P-5'-P-CCNC: 51 U/L (ref 10–44)
ANION GAP SERPL CALC-SCNC: 13 MMOL/L (ref 8–16)
APTT BLDCRRT: 52.2 SEC (ref 21–32)
APTT BLDCRRT: 53.5 SEC (ref 21–32)
AST SERPL-CCNC: 18 U/L (ref 10–40)
BASOPHILS # BLD AUTO: 0.01 K/UL (ref 0–0.2)
BASOPHILS NFR BLD: 0.1 % (ref 0–1.9)
BILIRUB SERPL-MCNC: 0.9 MG/DL (ref 0.1–1)
BUN SERPL-MCNC: 67 MG/DL (ref 8–23)
CALCIUM SERPL-MCNC: 7.6 MG/DL (ref 8.7–10.5)
CHLORIDE SERPL-SCNC: 102 MMOL/L (ref 95–110)
CO2 SERPL-SCNC: 23 MMOL/L (ref 23–29)
CREAT SERPL-MCNC: 3.8 MG/DL (ref 0.5–1.4)
DIFFERENTIAL METHOD: ABNORMAL
EOSINOPHIL # BLD AUTO: 0 K/UL (ref 0–0.5)
EOSINOPHIL NFR BLD: 0.1 % (ref 0–8)
ERYTHROCYTE [DISTWIDTH] IN BLOOD BY AUTOMATED COUNT: 15.9 % (ref 11.5–14.5)
EST. GFR  (AFRICAN AMERICAN): 16.4 ML/MIN/1.73 M^2
EST. GFR  (NON AFRICAN AMERICAN): 14.2 ML/MIN/1.73 M^2
GLUCOSE SERPL-MCNC: 191 MG/DL (ref 70–110)
HCT VFR BLD AUTO: 32.9 % (ref 40–54)
HGB BLD-MCNC: 11.1 G/DL (ref 14–18)
IMM GRANULOCYTES # BLD AUTO: 0.08 K/UL (ref 0–0.04)
IMM GRANULOCYTES NFR BLD AUTO: 0.9 % (ref 0–0.5)
LYMPHOCYTES # BLD AUTO: 1.1 K/UL (ref 1–4.8)
LYMPHOCYTES NFR BLD: 13.1 % (ref 18–48)
MAGNESIUM SERPL-MCNC: 2.1 MG/DL (ref 1.6–2.6)
MCH RBC QN AUTO: 31.7 PG (ref 27–31)
MCHC RBC AUTO-ENTMCNC: 33.7 G/DL (ref 32–36)
MCV RBC AUTO: 94 FL (ref 82–98)
MONOCYTES # BLD AUTO: 0.5 K/UL (ref 0.3–1)
MONOCYTES NFR BLD: 5.8 % (ref 4–15)
NEUTROPHILS # BLD AUTO: 6.9 K/UL (ref 1.8–7.7)
NEUTROPHILS NFR BLD: 80 % (ref 38–73)
NRBC BLD-RTO: 0 /100 WBC
PHOSPHATE SERPL-MCNC: 5.4 MG/DL (ref 2.7–4.5)
PLATELET # BLD AUTO: 113 K/UL (ref 150–350)
PMV BLD AUTO: 11.2 FL (ref 9.2–12.9)
POCT GLUCOSE: 129 MG/DL (ref 70–110)
POCT GLUCOSE: 154 MG/DL (ref 70–110)
POCT GLUCOSE: 179 MG/DL (ref 70–110)
POCT GLUCOSE: 188 MG/DL (ref 70–110)
POTASSIUM SERPL-SCNC: 4.3 MMOL/L (ref 3.5–5.1)
PROT SERPL-MCNC: 5.3 G/DL (ref 6–8.4)
RBC # BLD AUTO: 3.5 M/UL (ref 4.6–6.2)
SODIUM SERPL-SCNC: 138 MMOL/L (ref 136–145)
WBC # BLD AUTO: 8.62 K/UL (ref 3.9–12.7)

## 2019-03-26 PROCEDURE — 94640 AIRWAY INHALATION TREATMENT: CPT

## 2019-03-26 PROCEDURE — 99232 SBSQ HOSP IP/OBS MODERATE 35: CPT | Mod: GC,,, | Performed by: INTERNAL MEDICINE

## 2019-03-26 PROCEDURE — 85025 COMPLETE CBC W/AUTO DIFF WBC: CPT

## 2019-03-26 PROCEDURE — 99291 CRITICAL CARE FIRST HOUR: CPT | Mod: GC,,, | Performed by: PSYCHIATRY & NEUROLOGY

## 2019-03-26 PROCEDURE — 25000003 PHARM REV CODE 250: Performed by: INTERNAL MEDICINE

## 2019-03-26 PROCEDURE — 25000003 PHARM REV CODE 250: Performed by: STUDENT IN AN ORGANIZED HEALTH CARE EDUCATION/TRAINING PROGRAM

## 2019-03-26 PROCEDURE — 25000003 PHARM REV CODE 250: Performed by: HOSPITALIST

## 2019-03-26 PROCEDURE — 85730 THROMBOPLASTIN TIME PARTIAL: CPT | Mod: 91

## 2019-03-26 PROCEDURE — 92610 EVALUATE SWALLOWING FUNCTION: CPT

## 2019-03-26 PROCEDURE — 85730 THROMBOPLASTIN TIME PARTIAL: CPT

## 2019-03-26 PROCEDURE — 99232 PR SUBSEQUENT HOSPITAL CARE,LEVL II: ICD-10-PCS | Mod: GC,,, | Performed by: INTERNAL MEDICINE

## 2019-03-26 PROCEDURE — 80053 COMPREHEN METABOLIC PANEL: CPT

## 2019-03-26 PROCEDURE — 83735 ASSAY OF MAGNESIUM: CPT

## 2019-03-26 PROCEDURE — 99233 SBSQ HOSP IP/OBS HIGH 50: CPT | Mod: GC,,, | Performed by: PSYCHIATRY & NEUROLOGY

## 2019-03-26 PROCEDURE — 84100 ASSAY OF PHOSPHORUS: CPT

## 2019-03-26 PROCEDURE — 99291 PR CRITICAL CARE, E/M 30-74 MINUTES: ICD-10-PCS | Mod: GC,,, | Performed by: PSYCHIATRY & NEUROLOGY

## 2019-03-26 PROCEDURE — 99233 PR SUBSEQUENT HOSPITAL CARE,LEVL III: ICD-10-PCS | Mod: GC,,, | Performed by: PSYCHIATRY & NEUROLOGY

## 2019-03-26 PROCEDURE — 94761 N-INVAS EAR/PLS OXIMETRY MLT: CPT

## 2019-03-26 PROCEDURE — 20000000 HC ICU ROOM

## 2019-03-26 PROCEDURE — 63600175 PHARM REV CODE 636 W HCPCS: Performed by: UROLOGY

## 2019-03-26 PROCEDURE — 25000242 PHARM REV CODE 250 ALT 637 W/ HCPCS: Performed by: INTERNAL MEDICINE

## 2019-03-26 RX ORDER — CARVEDILOL 12.5 MG/1
12.5 TABLET ORAL 2 TIMES DAILY WITH MEALS
Status: ON HOLD | COMMUNITY
End: 2019-04-08 | Stop reason: HOSPADM

## 2019-03-26 RX ORDER — ALLOPURINOL 100 MG/1
100 TABLET ORAL DAILY
Status: ON HOLD | COMMUNITY
End: 2020-01-01 | Stop reason: ALTCHOICE

## 2019-03-26 RX ORDER — AMOXICILLIN 250 MG
1 CAPSULE ORAL 2 TIMES DAILY
Status: DISCONTINUED | OUTPATIENT
Start: 2019-03-26 | End: 2019-04-09 | Stop reason: HOSPADM

## 2019-03-26 RX ORDER — MONTELUKAST SODIUM 10 MG/1
10 TABLET ORAL NIGHTLY
COMMUNITY
End: 2019-10-11

## 2019-03-26 RX ORDER — IPRATROPIUM BROMIDE AND ALBUTEROL SULFATE 2.5; .5 MG/3ML; MG/3ML
3 SOLUTION RESPIRATORY (INHALATION) EVERY 4 HOURS PRN
Status: DISCONTINUED | OUTPATIENT
Start: 2019-03-26 | End: 2019-04-09 | Stop reason: HOSPADM

## 2019-03-26 RX ORDER — CARVEDILOL 25 MG/1
50 TABLET ORAL 2 TIMES DAILY WITH MEALS
Status: DISCONTINUED | OUTPATIENT
Start: 2019-03-27 | End: 2019-03-29

## 2019-03-26 RX ORDER — PANTOPRAZOLE SODIUM 40 MG/1
40 TABLET, DELAYED RELEASE ORAL DAILY
Status: DISCONTINUED | OUTPATIENT
Start: 2019-03-26 | End: 2019-04-08

## 2019-03-26 RX ORDER — POLYETHYLENE GLYCOL 3350 17 G/17G
17 POWDER, FOR SOLUTION ORAL DAILY
Status: DISCONTINUED | OUTPATIENT
Start: 2019-03-26 | End: 2019-04-09 | Stop reason: HOSPADM

## 2019-03-26 RX ORDER — AMLODIPINE BESYLATE 10 MG/1
10 TABLET ORAL DAILY
Status: ON HOLD | COMMUNITY
End: 2020-01-01 | Stop reason: HOSPADM

## 2019-03-26 RX ADMIN — POLYETHYLENE GLYCOL 3350 17 G: 17 POWDER, FOR SOLUTION ORAL at 10:03

## 2019-03-26 RX ADMIN — IPRATROPIUM BROMIDE AND ALBUTEROL SULFATE 3 ML: .5; 3 SOLUTION RESPIRATORY (INHALATION) at 12:03

## 2019-03-26 RX ADMIN — HEPARIN SODIUM AND DEXTROSE 14 UNITS/KG/HR: 10000; 5 INJECTION INTRAVENOUS at 11:03

## 2019-03-26 RX ADMIN — CARVEDILOL 25 MG: 25 TABLET, FILM COATED ORAL at 04:03

## 2019-03-26 RX ADMIN — FINASTERIDE 5 MG: 5 TABLET, FILM COATED ORAL at 08:03

## 2019-03-26 RX ADMIN — CARVEDILOL 25 MG: 25 TABLET, FILM COATED ORAL at 08:03

## 2019-03-26 RX ADMIN — HEPARIN SODIUM AND DEXTROSE 14 UNITS/KG/HR: 10000; 5 INJECTION INTRAVENOUS at 03:03

## 2019-03-26 RX ADMIN — TAMSULOSIN HYDROCHLORIDE 0.4 MG: 0.4 CAPSULE ORAL at 08:03

## 2019-03-26 RX ADMIN — STANDARDIZED SENNA CONCENTRATE AND DOCUSATE SODIUM 1 TABLET: 8.6; 5 TABLET, FILM COATED ORAL at 10:03

## 2019-03-26 RX ADMIN — INSULIN ASPART 2 UNITS: 100 INJECTION, SOLUTION INTRAVENOUS; SUBCUTANEOUS at 04:03

## 2019-03-26 RX ADMIN — ASPIRIN 81 MG: 81 TABLET, COATED ORAL at 08:03

## 2019-03-26 RX ADMIN — ALLOPURINOL 100 MG: 100 TABLET ORAL at 08:03

## 2019-03-26 RX ADMIN — INSULIN ASPART 2 UNITS: 100 INJECTION, SOLUTION INTRAVENOUS; SUBCUTANEOUS at 12:03

## 2019-03-26 RX ADMIN — FLUTICASONE PROPIONATE 100 MCG: 50 SPRAY, METERED NASAL at 08:03

## 2019-03-26 RX ADMIN — PANTOPRAZOLE SODIUM 40 MG: 40 TABLET, DELAYED RELEASE ORAL at 10:03

## 2019-03-26 RX ADMIN — TORSEMIDE 20 MG: 20 TABLET ORAL at 08:03

## 2019-03-26 RX ADMIN — GABAPENTIN 300 MG: 300 CAPSULE ORAL at 08:03

## 2019-03-26 RX ADMIN — INSULIN DETEMIR 12 UNITS: 100 INJECTION, SOLUTION SUBCUTANEOUS at 08:03

## 2019-03-26 RX ADMIN — STANDARDIZED SENNA CONCENTRATE AND DOCUSATE SODIUM 1 TABLET: 8.6; 5 TABLET, FILM COATED ORAL at 08:03

## 2019-03-26 RX ADMIN — INSULIN ASPART 2 UNITS: 100 INJECTION, SOLUTION INTRAVENOUS; SUBCUTANEOUS at 08:03

## 2019-03-26 RX ADMIN — IPRATROPIUM BROMIDE AND ALBUTEROL SULFATE 3 ML: .5; 3 SOLUTION RESPIRATORY (INHALATION) at 04:03

## 2019-03-26 RX ADMIN — ATORVASTATIN CALCIUM 80 MG: 20 TABLET, FILM COATED ORAL at 08:03

## 2019-03-26 RX ADMIN — IPRATROPIUM BROMIDE AND ALBUTEROL SULFATE 3 ML: .5; 3 SOLUTION RESPIRATORY (INHALATION) at 09:03

## 2019-03-26 RX ADMIN — LOSARTAN POTASSIUM 100 MG: 50 TABLET, FILM COATED ORAL at 08:03

## 2019-03-26 NOTE — PROGRESS NOTES
Hemodialysis    3-hour HD completed, patient tolerated well, no prolonged bleeding upon needle removal, hemostasis achieved. left AV fistula with bruit and thrill, gauzed and taped.    NET UF REMOVED: 500ml  Report given to primary nurse.

## 2019-03-26 NOTE — PROGRESS NOTES
[facility]  Nephrology  Progress Note    Patient Name: Micheal Hunt  MRN: [id number]  Admission Date: 3/22/2019  Hospital Length of Stay: 4 days  Attending Provider: Jean Paul Watson MD   Primary Care Physician: Pk Lakhani MD  Principal Problem:Acute ischemic left PCA stroke    Subjective:     HPI: 79 y/o male with PMH ESRD on HD,  HTN, HLD, CAD (s/p CABG 2007), paroxysmal AFib, chronic diastolic heart failure, asthma, T2DM, BPH, recent hospitalization 2/2 PNA (3/11/19)  transferred from [facility] for evaluation of unstable angina    History obtained from the patient and the medical chart        Interval History:   Patient evaluated at bedside, had HD treatment yesterday which was well tolerated. NET negative 1.4 L yesterday     Review of patient's allergies indicates:   Allergen Reactions    Ace inhibitors Other (See Comments)     Cough    Arb-angiotensin receptor antagonist Itching    Eplerenone Other (See Comments)     Marked bradycardia, 40, tiredness and weakness      Sulfa (sulfonamide antibiotics) Itching     Patient says this was 10 years ago and doesn't remember what happened     Current Facility-Administered Medications   Medication Frequency    0.9%  NaCl infusion PRN    0.9%  NaCl infusion PRN    acetaminophen tablet 650 mg Q6H PRN    albuterol-ipratropium 2.5 mg-0.5 mg/3 mL nebulizer solution 3 mL Q4H    albuterol-ipratropium 2.5 mg-0.5 mg/3 mL nebulizer solution 3 mL Q6H PRN    allopurinol tablet 100 mg Daily    aspirin EC tablet 81 mg Daily    atorvastatin tablet 80 mg Daily    carvedilol tablet 25 mg BID WM    dextrose 50% injection 12.5 g PRN    dextrose 50% injection 25 g PRN    finasteride tablet 5 mg Daily    fluticasone 50 mcg/actuation nasal spray 100 mcg Daily    gabapentin capsule 300 mg QHS    glucagon (human recombinant) injection 1 mg PRN    glucose chewable tablet 16 g PRN    glucose chewable tablet 24 g PRN    heparin 25,000  units in dextrose 5% (100 units/ml) IV bolus from bag - ADDITIONAL PRN BOLUS - 30 units/kg (max bolus 4000 units) PRN    heparin 25,000 units in dextrose 5% (100 units/ml) IV bolus from bag - ADDITIONAL PRN BOLUS - 60 units/kg (max bolus 4000 units) PRN    heparin 25,000 units in dextrose 5% 250 mL (100 units/mL) infusion LOW INTENSITY nomogram - [facility] Continuous    insulin aspart U-100 pen 1-10 Units QID (AC + HS) PRN    insulin detemir U-100 pen 12 Units Daily    losartan tablet 100 mg QHS    magnesium oxide tablet 800 mg PRN    magnesium oxide tablet 800 mg PRN    mirtazapine tablet 7.5 mg QHS    nitroGLYCERIN SL tablet 0.4 mg Q5 Min PRN    ondansetron tablet 8 mg Q6H PRN    potassium chloride 10% oral solution 40 mEq PRN    potassium chloride 10% oral solution 40 mEq PRN    senna tablet 8.6 mg Daily PRN    sertraline tablet 25 mg Daily    [START ON 3/29/2019] sertraline tablet 50 mg Daily    tamsulosin 24 hr capsule 0.4 mg Daily    torsemide tablet 20 mg BID       Objective:     Vital Signs (Most Recent):  Temp: 98.1 °F (36.7 °C) (03/26/19 0705)  Pulse: 76 (03/26/19 0905)  Resp: 17 (03/26/19 0905)  BP: (!) 149/62 (03/26/19 0905)  SpO2: 99 % (03/26/19 0905)  O2 Device (Oxygen Therapy): room air (03/26/19 0705) Vital Signs (24h Range):  Temp:  [98 °F (36.7 °C)-98.9 °F (37.2 °C)] 98.1 °F (36.7 °C)  Pulse:  [64-85] 76  Resp:  [12-33] 17  SpO2:  [91 %-100 %] 99 %  BP: (132-217)/() 149/62  Arterial Line BP: (138-191)/(58-87) 138/58     Weight: 106.6 kg (235 lb 0.2 oz) (03/24/19 0600)  Body mass index is 31.01 kg/m².  Body surface area is 2.34 meters squared.    I/O last 3 completed shifts:  In: 1425.2 [I.V.:925.2; Other:500]  Out: 2950 [Urine:1950; Other:1000]    Physical Exam   Constitutional: No distress.   HENT:   Head: Normocephalic.   Right Ear: External ear normal.   Eyes: Right eye exhibits no discharge. Left eye exhibits no discharge.   Cardiovascular: An irregular rhythm present.    Pulmonary/Chest: Effort normal. No respiratory distress.   Abdominal: Soft.   Neurological: He is alert.   Skin: Skin is warm.       Significant Labs:  ABGs:   Recent Labs   Lab 03/24/19  1419   PH 7.397   PCO2 31.7*   HCO3 19.5*   POCSATURATED 97   BE -5     BMP:   Recent Labs   Lab 03/26/19  0345   *      CO2 23   BUN 67*   CREATININE 3.8*   CALCIUM 7.6*   MG 2.1     CBC:   Recent Labs   Lab 03/26/19  0345   WBC 8.62   RBC 3.50*   HGB 11.1*   HCT 32.9*   *   MCV 94   MCH 31.7*   MCHC 33.7     CMP:   Recent Labs   Lab 03/26/19  0345   *   CALCIUM 7.6*   ALBUMIN 3.0*   PROT 5.3*      K 4.3   CO2 23      BUN 67*   CREATININE 3.8*   ALKPHOS 92   ALT 51*   AST 18   BILITOT 0.9     All labs within the past 24 hours have been reviewed.       Assessment/Plan:     ESRD (end stage renal disease) on dialysis  Micheal Hunt is a 78 year old man who is ESRD which presented with L PCA infarct     Dialysis Information: [facility]  -Out patient HD unit: [facility]  -Nephrologist: Dr. Valderrama in [place]  -HD tx days: MWF  -HD tx time: 4 hours  -HD access: AVF   -Last HD: yesterday  -EDW: 106.0kg  -RRF: yes      Assessment and plan:  - No need for HD treatment today   - Will reassess tomorrow which will most likely require treatment       Severe persistent asthma with acute exacerbation  -Managed by critical team     Anemia of chronic disease  - Hgb dropped to 7.8 today  - maintain Hgb > 7         Wilfred Pittman  Nephrology  Fellow  [facility]    Pager [phone]

## 2019-03-26 NOTE — SUBJECTIVE & OBJECTIVE
Interval History:  >4 elements OR status of 3 inpatient conditions    Review of Systems   Constitutional: Positive for fatigue.   HENT: Negative.    Eyes: Negative.    Respiratory: Negative.    Cardiovascular: Negative.    Gastrointestinal: Positive for abdominal distention.   Endocrine: Negative.    Genitourinary: Negative.    Musculoskeletal: Positive for back pain.   Neurological: Positive for weakness and numbness.   Psychiatric/Behavioral: Negative.      2 systems OR Unable to obtain a complete ROS due to level of consciousness.  Objective:     Vitals:  Temp: 98.4 °F (36.9 °C)  Pulse: 87  Rhythm: atrial rhythm  BP: (!) 186/72  MAP (mmHg): 104  Resp: (!) 30  SpO2: 98 %  O2 Device (Oxygen Therapy): room air    Temp  Min: 98.1 °F (36.7 °C)  Max: 98.9 °F (37.2 °C)  Pulse  Min: 71  Max: 87  BP  Min: 132/91  Max: 217/88  MAP (mmHg)  Min: 89  Max: 154  Resp  Min: 12  Max: 33  SpO2  Min: 91 %  Max: 100 %    03/25 0701 - 03/26 0700  In: 831.8 [I.V.:331.8]  Out: 2300 [Urine:1300]   Unmeasured Output  Urine Occurrence: 1  Stool Occurrence: 1       Physical Exam    Constitutional: Well-nourished and -developed. No apparent distress.   Eyes: Conjunctiva clear, anicteric. Lids no lesions.  Head/Ears/Nose/Mouth/Throat/Neck: Moist mucous membranes. External ears, nose atraumatic.   Cardiovascular: IrRegular rhythm. ESM  Respiratory: Comfortable respirations. Mild wheezing  Gastrointestinal:  Distended but soft and lax. +ve BS      Neurologic Examination:    -Mental Status: Alert. Oriented to person, place, and time. Speech fluent. Follows commands.   -Cranial nerves: Pupils equal, round, and reactive bilateral. Extraocular movements intact.   -Motor: RUE 4-/5, RLE 4-/5. LUE 5/5, LLE 5/5  -Sensation: Decreased sensation to the right nikita body       Medications:  Continuous    heparin (porcine) in D5W Last Rate: 14 Units/kg/hr (03/26/19 1205)   Scheduled    allopurinol 100 mg Daily   aspirin 81 mg Daily   atorvastatin 80 mg  Daily   carvedilol 25 mg BID WM   finasteride 5 mg Daily   gabapentin 300 mg QHS   insulin detemir U-100 12 Units Daily   losartan 100 mg QHS   pantoprazole 40 mg Daily   polyethylene glycol 17 g Daily   senna-docusate 8.6-50 mg 1 tablet BID   tamsulosin 1 capsule Daily   torsemide 20 mg BID   PRN    acetaminophen 650 mg Q6H PRN   albuterol-ipratropium 3 mL Q4H PRN   dextrose 50% 12.5 g PRN   dextrose 50% 25 g PRN   glucagon (human recombinant) 1 mg PRN   glucose 16 g PRN   glucose 24 g PRN   heparin (PORCINE) 30 Units/kg (Adjusted) PRN   heparin (PORCINE) 44.2 Units/kg (Adjusted) PRN   insulin aspart U-100 1-10 Units QID (AC + HS) PRN   nitroGLYCERIN 0.4 mg Q5 Min PRN   ondansetron 8 mg Q6H PRN     Today I personally reviewed pertinent medications, lines/drains/airways, imaging, cardiology results, laboratory results, microbiology results, notably:    Diet  Diet diabetic [facility]; 2000 Calorie; Cardiac (Low Na/Chol), Renal; Isolation Tray - Regular [place] Diet diabetic [facility]; 2000 Calorie; Cardiac (Low Na/Chol), Renal; Isolation Tray - Regular [place]

## 2019-03-26 NOTE — ASSESSMENT & PLAN NOTE
Continue scheduled HD   Patient is being followed by nephrology, appreciate their assistance  Patient still makes urine - continue diuresis with torsemide - >1L UOP in last 24hours  Patient tolerated HD with 500ml removed as well as metabolic toxins

## 2019-03-26 NOTE — ASSESSMENT & PLAN NOTE
Lab Results   Component Value Date    HGBA1C 8.3 (H) 03/21/2019     Stoke risk factor   managed by primary team   Continue basal insulin with sliding scale per primary service (consider increasing basal with relatively high sliding scale demands)

## 2019-03-26 NOTE — PLAN OF CARE
Problem: SLP Goal  Goal: SLP Goal  Swallow eval completed. Pt safe for regular diet and thin liquids.    KRISTIN Taveras, CCC-SLP  /3/26/2019

## 2019-03-26 NOTE — PROGRESS NOTES
[facility]  Neurocritical Care  Progress Note    Admit Date: 3/22/2019  Service Date: 03/26/2019  Length of Stay: 4    Subjective:     Chief Complaint: Acute ischemic left PCA stroke    History of Present Illness: Patient is a 77 yo male with PMH HTN, AFIB, CAD s/p CABG 2007, HFpEF, HLD, DM, ESRD, and Asthma transferred initially for evaluation of unstable angina until becoming symptomatic with R side weakness and dysarthria. Patient was admitted to Hospital Medicine 3/22 for evaluation of unstable angina. On 3/24 @ 1000, the patient's daughter came to visit and she noticed him lethargic, disoriented which soon progressed to include facial droop and dysarthria. A sroke code was called in response to new onset R weakness and dysarthria. MRI was completed noting Acute L PCA infarct. Per Vascular Neuro staff, the symptoms improved while supine during imaging. The patient was subsequently transferred to [facility] for further management. The patient does have a history of coumadin and ASA use. During admission he was placed on a heparin drip for Afib but it was quickly discontinued after the patient began to experience feeling of euphoria which were resolved with discontinuation of heparin gtt.              Hospital Course: 3/25: NAEO.   3/26 Had few beats of Vtach this am     Interval History:  >4 elements OR status of 3 inpatient conditions    Review of Systems   Constitutional: Positive for fatigue.   HENT: Negative.    Eyes: Negative.    Respiratory: Negative.    Cardiovascular: Negative.    Gastrointestinal: Positive for abdominal distention.   Endocrine: Negative.    Genitourinary: Negative.    Musculoskeletal: Positive for back pain.   Neurological: Positive for weakness and numbness.   Psychiatric/Behavioral: Negative.      2 systems OR Unable to obtain a complete ROS due to level of consciousness.  Objective:     Vitals:  Temp: 98.4 °F (36.9 °C)  Pulse: 87  Rhythm: atrial rhythm  BP: (!)  186/72  MAP (mmHg): 104  Resp: (!) 30  SpO2: 98 %  O2 Device (Oxygen Therapy): room air    Temp  Min: 98.1 °F (36.7 °C)  Max: 98.9 °F (37.2 °C)  Pulse  Min: 71  Max: 87  BP  Min: 132/91  Max: 217/88  MAP (mmHg)  Min: 89  Max: 154  Resp  Min: 12  Max: 33  SpO2  Min: 91 %  Max: 100 %    03/25 0701 - 03/26 0700  In: 831.8 [I.V.:331.8]  Out: 2300 [Urine:1300]   Unmeasured Output  Urine Occurrence: 1  Stool Occurrence: 1       Physical Exam    Constitutional: Well-nourished and -developed. No apparent distress.   Eyes: Conjunctiva clear, anicteric. Lids no lesions.  Head/Ears/Nose/Mouth/Throat/Neck: Moist mucous membranes. External ears, nose atraumatic.   Cardiovascular: IrRegular rhythm. ESM  Respiratory: Comfortable respirations. Mild wheezing  Gastrointestinal:  Distended but soft and lax. +ve BS      Neurologic Examination:    -Mental Status: Alert. Oriented to person, place, and time. Speech fluent. Follows commands.   -Cranial nerves: Pupils equal, round, and reactive bilateral. Extraocular movements intact.   -Motor: RUE 4-/5, RLE 4-/5. LUE 5/5, LLE 5/5  -Sensation: Decreased sensation to the right nikita body       Medications:  Continuous    heparin (porcine) in D5W Last Rate: 14 Units/kg/hr (03/26/19 1205)   Scheduled    allopurinol 100 mg Daily   aspirin 81 mg Daily   atorvastatin 80 mg Daily   carvedilol 25 mg BID WM   finasteride 5 mg Daily   gabapentin 300 mg QHS   insulin detemir U-100 12 Units Daily   losartan 100 mg QHS   pantoprazole 40 mg Daily   polyethylene glycol 17 g Daily   senna-docusate 8.6-50 mg 1 tablet BID   tamsulosin 1 capsule Daily   torsemide 20 mg BID   PRN    acetaminophen 650 mg Q6H PRN   albuterol-ipratropium 3 mL Q4H PRN   dextrose 50% 12.5 g PRN   dextrose 50% 25 g PRN   glucagon (human recombinant) 1 mg PRN   glucose 16 g PRN   glucose 24 g PRN   heparin (PORCINE) 30 Units/kg (Adjusted) PRN   heparin (PORCINE) 44.2 Units/kg (Adjusted) PRN   insulin aspart U-100 1-10 Units QID (AC +  HS) PRN   nitroGLYCERIN 0.4 mg Q5 Min PRN   ondansetron 8 mg Q6H PRN     Today I personally reviewed pertinent medications, lines/drains/airways, imaging, cardiology results, laboratory results, microbiology results, notably:    Diet  Diet diabetic [facility]; 2000 Calorie; Cardiac (Low Na/Chol), Renal; Isolation Tray - Regular [place]  Diet diabetic [facility]; 2000 Calorie; Cardiac (Low Na/Chol), Renal; Isolation Tray - Regular [place]        Assessment/Plan:     Neuro  * Acute ischemic left PCA stroke  Continue current secondary prevention measures  Start elevating the head of the bed gradually and monitor for signs of cerebral hypoperfusion       Cardiac/Vascular  Paroxysmal atrial fibrillation  Rate controlled  Continue heparin gtt        NSTEMI (non-ST elevated myocardial infarction)  Continue medical management  Appreciate cards recs    CAD (coronary artery disease), 2V CABG 2007  Hx CAD/ CABG 2007  Continue ASA  Continue statin  Appreciate card recs      Essential hypertension  Permissive HTN      Carotid artery stenosis  - Appreciate vascular surgery recs. Medical management for now      Renal/  ESRD (end stage renal disease) on dialysis  Tolerated HD yesterday  Appreciate nephrology recs    Hematology  Long term (current) use of anticoagulants  Continue heparin gtt      Endocrine  Type 2 diabetes mellitus, without long-term current use of insulin  Continue current regimen           The patient is being Prophylaxed for:  Venous Thromboembolism with: Mechanical or Chemical  Stress Ulcer with: H2B  Ventilator Pneumonia with: not applicable    Activity Orders          Diet diabetic [facility]; 2000 Calorie; Cardiac (Low Na/Chol), Renal; Isolation Tray - Regular [place]: Diabetic starting at 03/26 0938        Full Code  Uninterrupted Critical Care/Counseling Time (not including procedures): 35 minutes     Augusto Stevens MD  Neurocritical Care  [facility]

## 2019-03-26 NOTE — PT/OT/SLP EVAL
Speech Language Pathology Evaluation  Bedside Swallow    Patient Name:  Micheal Hunt   MRN:  [id number]  Admitting Diagnosis: Acute ischemic left PCA stroke    Recommendations:                 General Recommendations:  Speech language evaluation  Diet recommendations:  Regular, Thin   Aspiration Precautions: Standard aspiration precautions   General Precautions: Standard, aspiration  Communication strategies:  none    History:     Past Medical History:   Diagnosis Date    NICK (acute kidney injury) 7/29/2016    Allergy     Anemia, mild 12/15/2014    Arthritis     Gout    Benign essential HTN 3/27/2012    BMI 29.0-29.9,adult 5/10/2018    BPH (benign prostatic hyperplasia)     BPH (benign prostatic hyperplasia)     CAD (coronary artery disease) 2006    Chronic kidney disease     due to ibuprofen    Colon polyp     CRF (chronic renal failure), stage 5     Diverticulosis     Gastritis     GERD (gastroesophageal reflux disease)     Gout     History of colon polyps 5/3/2018    HTN (hypertension) 3/27/2012    Hyperlipidemia     Hyperlipidemia     LLL pneumonia 6/14/2018    LVH (left ventricular hypertrophy)     Mesenteric ischemia     Murmur, cardiac 3/27/2012    GRICELDA (obstructive sleep apnea)     DOES NOT USE A MACHINE    Sinus problem     Syncope and collapse        Past Surgical History:   Procedure Laterality Date    APPENDECTOMY      BLOCK-NERVE Left 5/31/2016    [facility]    CARDIAC CATHETERIZATION      COLONOSCOPY  2011    COLONOSCOPY N/A 5/3/2018    [facility] ENDO    COLONOSCOPY N/A 9/10/2015    [facility] at [facility] ENDO    CORONARY ARTERY BYPASS GRAFT  4/2007    x 1    CYSTOSCOPY N/A 8/30/2017    [facility] at [facility] OR    CYSTOSCOPY N/A 11/10/2015    [facility] at [facility] OR    ESOPHAGOGASTRODUODENOSCOPY (EGD) N/A 1/4/2016    Performed by Tra IRWIN  "MD Cecilia at [facility] ENDO (2ND FLR)    ESOPHAGOGASTRODUODENOSCOPY (EGD) N/A 10/15/2014    [facility]    INJECTION-STEROID-EPIDURAL-TRANSFORAMINAL Left 2/25/2016    [facility] OR    JOINT REPLACEMENT      left knee total replacement  X 3    mid leftt finger      from a cactuss    MOLE REMOVAL  2016    RADIOFREQUENCY THERMOCOAGULATION (RFTC)-NERVE-MEDIAN BRANCH-LUMBAR Left 4/11/2016    [facility] at [facility] OR    rotative cuff      no rotative cuffs on bilat shoulders has pins     SHOULDER SURGERY      shoulder surgery bilat  RIGHT X 4; LEFT X 3    TRANSRECTAL ULTRASOUND GUIDED PROSTATE BIOPSY Bilateral 11/10/2015    [facility]    ULTRASOUND-ENDOSCOPIC-UPPER N/A 5/31/2017    Performed by Tra Brooks MD at [facility] ENDO (2ND FLR)    ULTRASOUND-ENDOSCOPIC-UPPER N/A 1/4/2016    Performed by Tra Brooks [facility] at [facility] ENDO (2ND FLR)    ULTRASOUND-ENDOSCOPIC-UPPER N/A 1/16/2015    Performed by Jason Saleem MD at [facility] ENDO (2ND FLR)           Prior diet: regular/thin        Subjective     "I'm fine"  Patient goals: didn't state    Pain/Comfort:  · Pain Rating 1: 0/10  · Pain Rating Post-Intervention 1: 0/10    Objective:     Oral Musculature Evaluation  · Oral Musculature: WFL  · Dentition: present and adequate  · Mucosal Quality: adequate  · Mandibular Strength and Mobility: WFL  · Oral Labial Strength and Mobility: WFL  · Lingual Strength and Mobility: WFL  · Buccal Strength and Mobility: WFL  · Volitional Cough: adequate  · Voice Prior to PO Intake: clear    Bedside Swallow Eval:   Consistencies Assessed:  · Thin liquids cup and straw sips x1 1/2 cups  · Solids cracker     Oral Phase:   · WFL    Pharyngeal Phase:   · no overt clinical signs/symptoms of pharyngeal dysphagia    Compensatory Strategies  · None    Treatment: Daughter reported pt with speech and memory problems but felt speech has improved. " Education provided re: role of slp,poc and universal swallowing precautions. Family and pt expressed understanding. White board updated.     Assessment:     Micheal Hunt is a 78 y.o. 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  Speech tx will follow up for jarret holt.    Goals:   Multidisciplinary Problems     SLP Goals        Problem: SLP Goal    Goal Priority Disciplines Outcome   SLP Goal     SLP    Description:  Goals to be met 4/1  1 pt will tolerate regular diet and thin liquids  2 pt will participate in speech lang eval                      Plan:     · Patient to be seen:  4 x/week   · Plan of Care expires:     · Plan of Care reviewed with:  patient, family   · SLP Follow-Up:  Yes       Discharge recommendations:  other (see comments)(tbd)       Time Tracking:     SLP Treatment Date:   03/26/19  Speech Start Time:  1016  Speech Stop Time:  1026     Speech Total Time (min):  10 min    Billable Minutes: Eval Swallow and Oral Function 10    KRISTIN Taveras, CCC-SLP  03/26/2019

## 2019-03-26 NOTE — PROGRESS NOTES
Hemodialysis    Bedside HD treatment in room 9090A. Patient is on room air, awake, alert, oriented.   ACCESS: left upper arm AV fistula with strong bruit and thrill, hematoma noted on access site, cannulated using gauge 15 needles smoothly.    HD started

## 2019-03-26 NOTE — PROGRESS NOTES
Pt went to MRI with RNx2. Pt traveled with O2, monitor, and ambubag. Pt tolerated scan well. No complications. Team notified after exam. Will continue to monitor.

## 2019-03-26 NOTE — SUBJECTIVE & OBJECTIVE
Interval History:   Patient evaluated at bedside, had HD treatment yesterday which was well tolerated. NET negative 1.4 L yesterday     Review of patient's allergies indicates:   Allergen Reactions    Ace inhibitors Other (See Comments)     Cough    Arb-angiotensin receptor antagonist Itching    Eplerenone Other (See Comments)     Marked bradycardia, 40, tiredness and weakness      Sulfa (sulfonamide antibiotics) Itching     Patient says this was 10 years ago and doesn't remember what happened     Current Facility-Administered Medications   Medication Frequency    0.9%  NaCl infusion PRN    0.9%  NaCl infusion PRN    acetaminophen tablet 650 mg Q6H PRN    albuterol-ipratropium 2.5 mg-0.5 mg/3 mL nebulizer solution 3 mL Q4H    albuterol-ipratropium 2.5 mg-0.5 mg/3 mL nebulizer solution 3 mL Q6H PRN    allopurinol tablet 100 mg Daily    aspirin EC tablet 81 mg Daily    atorvastatin tablet 80 mg Daily    carvedilol tablet 25 mg BID WM    dextrose 50% injection 12.5 g PRN    dextrose 50% injection 25 g PRN    finasteride tablet 5 mg Daily    fluticasone 50 mcg/actuation nasal spray 100 mcg Daily    gabapentin capsule 300 mg QHS    glucagon (human recombinant) injection 1 mg PRN    glucose chewable tablet 16 g PRN    glucose chewable tablet 24 g PRN    heparin 25,000 units in dextrose 5% (100 units/ml) IV bolus from bag - ADDITIONAL PRN BOLUS - 30 units/kg (max bolus 4000 units) PRN    heparin 25,000 units in dextrose 5% (100 units/ml) IV bolus from bag - ADDITIONAL PRN BOLUS - 60 units/kg (max bolus 4000 units) PRN    heparin 25,000 units in dextrose 5% 250 mL (100 units/mL) infusion LOW INTENSITY nomogram - [facility] Continuous    insulin aspart U-100 pen 1-10 Units QID (AC + HS) PRN    insulin detemir U-100 pen 12 Units Daily    losartan tablet 100 mg QHS    magnesium oxide tablet 800 mg PRN    magnesium oxide tablet 800 mg PRN    mirtazapine tablet 7.5 mg QHS    nitroGLYCERIN SL tablet 0.4 mg  Q5 Min PRN    ondansetron tablet 8 mg Q6H PRN    potassium chloride 10% oral solution 40 mEq PRN    potassium chloride 10% oral solution 40 mEq PRN    senna tablet 8.6 mg Daily PRN    sertraline tablet 25 mg Daily    [START ON 3/29/2019] sertraline tablet 50 mg Daily    tamsulosin 24 hr capsule 0.4 mg Daily    torsemide tablet 20 mg BID       Objective:     Vital Signs (Most Recent):  Temp: 98.1 °F (36.7 °C) (03/26/19 0705)  Pulse: 76 (03/26/19 0905)  Resp: 17 (03/26/19 0905)  BP: (!) 149/62 (03/26/19 0905)  SpO2: 99 % (03/26/19 0905)  O2 Device (Oxygen Therapy): room air (03/26/19 0705) Vital Signs (24h Range):  Temp:  [98 °F (36.7 °C)-98.9 °F (37.2 °C)] 98.1 °F (36.7 °C)  Pulse:  [64-85] 76  Resp:  [12-33] 17  SpO2:  [91 %-100 %] 99 %  BP: (132-217)/() 149/62  Arterial Line BP: (138-191)/(58-87) 138/58     Weight: 106.6 kg (235 lb 0.2 oz) (03/24/19 0600)  Body mass index is 31.01 kg/m².  Body surface area is 2.34 meters squared.    I/O last 3 completed shifts:  In: 1425.2 [I.V.:925.2; Other:500]  Out: 2950 [Urine:1950; Other:1000]    Physical Exam   Constitutional: No distress.   HENT:   Head: Normocephalic.   Right Ear: External ear normal.   Eyes: Right eye exhibits no discharge. Left eye exhibits no discharge.   Cardiovascular: An irregular rhythm present.   Pulmonary/Chest: Effort normal. No respiratory distress.   Abdominal: Soft.   Neurological: He is alert.   Skin: Skin is warm.       Significant Labs:  ABGs:   Recent Labs   Lab 03/24/19  1419   PH 7.397   PCO2 31.7*   HCO3 19.5*   POCSATURATED 97   BE -5     BMP:   Recent Labs   Lab 03/26/19  0345   *      CO2 23   BUN 67*   CREATININE 3.8*   CALCIUM 7.6*   MG 2.1     CBC:   Recent Labs   Lab 03/26/19 0345   WBC 8.62   RBC 3.50*   HGB 11.1*   HCT 32.9*   *   MCV 94   MCH 31.7*   MCHC 33.7     CMP:   Recent Labs   Lab 03/26/19  0345   *   CALCIUM 7.6*   ALBUMIN 3.0*   PROT 5.3*      K 4.3   CO2 23       BUN 67*   CREATININE 3.8*   ALKPHOS 92   ALT 51*   AST 18   BILITOT 0.9     All labs within the past 24 hours have been reviewed.

## 2019-03-26 NOTE — SUBJECTIVE & OBJECTIVE
Neurologic Chief Complaint: right sided weakness    Subjective:     Interval History: Patient is seen for follow-up neurological assessment and treatment recommendations: see hospital course for interval history    HPI, Past Medical, Family, and Social History remains the same as documented in the initial encounter.     Review of Systems   Constitutional: Negative for chills, fatigue and fever.   HENT: Negative for postnasal drip and sore throat.    Eyes: Negative.    Respiratory: Positive for shortness of breath. Negative for chest tightness.    Cardiovascular: Negative for chest pain, palpitations and leg swelling.   Gastrointestinal: Negative for abdominal pain, blood in stool, constipation and nausea.   Endocrine: Negative.    Genitourinary: Negative for dysuria.   Musculoskeletal: Negative for arthralgias, back pain and joint swelling.   Skin: Negative.    Allergic/Immunologic: Negative.    Neurological: Positive for dizziness, speech difficulty and weakness. Negative for light-headedness and headaches.   Psychiatric/Behavioral: Negative for confusion and dysphoric mood. The patient is not nervous/anxious and is not hyperactive.      Scheduled Meds:   allopurinol  100 mg Oral Daily    aspirin  81 mg Oral Daily    atorvastatin  80 mg Oral Daily    carvedilol  25 mg Oral BID WM    finasteride  5 mg Oral Daily    gabapentin  300 mg Oral QHS    insulin detemir U-100  12 Units Subcutaneous Daily    losartan  100 mg Oral QHS    pantoprazole  40 mg Oral Daily    polyethylene glycol  17 g Oral Daily    senna-docusate 8.6-50 mg  1 tablet Oral BID    tamsulosin  1 capsule Oral Daily    torsemide  20 mg Oral BID     Continuous Infusions:   heparin (porcine) in D5W 14 Units/kg/hr (03/26/19 0905)     PRN Meds:acetaminophen, albuterol-ipratropium, dextrose 50%, dextrose 50%, glucagon (human recombinant), glucose, glucose, heparin (PORCINE), heparin (PORCINE), insulin aspart U-100, nitroGLYCERIN,  ondansetron    Objective:     Vital Signs (Most Recent):  Temp: 98.1 °F (36.7 °C) (03/26/19 0705)  Pulse: 76 (03/26/19 0905)  Resp: 17 (03/26/19 0905)  BP: (!) 149/62 (03/26/19 0905)  SpO2: 99 % (03/26/19 0905)  BP Location: Right leg    Vital Signs Range (Last 24H):  Temp:  [98 °F (36.7 °C)-98.9 °F (37.2 °C)]   Pulse:  [67-85]   Resp:  [12-33]   BP: (132-217)/()   SpO2:  [91 %-100 %]   Arterial Line BP: (138-191)/(58-87)   BP Location: Right leg    Physical Exam   Constitutional: He is oriented to person, place, and time. He appears well-developed and well-nourished. He is cooperative.  Non-toxic appearance. He does not have a sickly appearance. He does not appear ill. No distress.   HENT:   Head: Normocephalic and atraumatic.   Eyes: Pupils are equal, round, and reactive to light.   Cardiovascular: S1 normal, S2 normal and intact distal pulses. An irregularly irregular rhythm present.   Murmur heard.  Pulses:       Radial pulses are 2+ on the right side, and 2+ on the left side.        Dorsalis pedis pulses are 2+ on the right side, and 2+ on the left side.   Pulmonary/Chest: He has decreased breath sounds. He has rales.   Abdominal: Soft.   Musculoskeletal: He exhibits no edema.   Neurological: He is alert and oriented to person, place, and time. No cranial nerve deficit or sensory deficit.   Slight facial droop on R   4/5 strength on R UE  5/5 in all other extremities   Some evidence of hemispatial neglect   Dysarthria with errors in language and issues with R field of vision    Skin: Skin is warm and dry.   Nursing note and vitals reviewed.      Neurological Exam:   LOC: alert  Language: No aphasia, Other: higher level word finding difficulty - dysarthria with mild errors in language and challenges in seeing/expressing words/letters in R field of vision   Pupils (CN II, III): PERRL    Laboratory:  Recent Results (from the past 24 hour(s))   APTT    Collection Time: 03/25/19  6:45 PM   Result Value Ref  Range    aPTT 34.3 (H) 21.0 - 32.0 sec   POCT glucose    Collection Time: 03/25/19  7:30 PM   Result Value Ref Range    POCT Glucose 253 (H) 70 - 110 mg/dL   POCT glucose    Collection Time: 03/25/19  9:47 PM   Result Value Ref Range    POCT Glucose 136 (H) 70 - 110 mg/dL   Comprehensive metabolic panel    Collection Time: 03/26/19  3:45 AM   Result Value Ref Range    Sodium 138 136 - 145 mmol/L    Potassium 4.3 3.5 - 5.1 mmol/L    Chloride 102 95 - 110 mmol/L    CO2 23 23 - 29 mmol/L    Glucose 191 (H) 70 - 110 mg/dL    BUN, Bld 67 (H) 8 - 23 mg/dL    Creatinine 3.8 (H) 0.5 - 1.4 mg/dL    Calcium 7.6 (L) 8.7 - 10.5 mg/dL    Total Protein 5.3 (L) 6.0 - 8.4 g/dL    Albumin 3.0 (L) 3.5 - 5.2 g/dL    Total Bilirubin 0.9 0.1 - 1.0 mg/dL    Alkaline Phosphatase 92 55 - 135 U/L    AST 18 10 - 40 U/L    ALT 51 (H) 10 - 44 U/L    Anion Gap 13 8 - 16 mmol/L    eGFR if African American 16.4 (A) >60 mL/min/1.73 m^2    eGFR if non  14.2 (A) >60 mL/min/1.73 m^2   Magnesium    Collection Time: 03/26/19  3:45 AM   Result Value Ref Range    Magnesium 2.1 1.6 - 2.6 mg/dL   Phosphorus    Collection Time: 03/26/19  3:45 AM   Result Value Ref Range    Phosphorus 5.4 (H) 2.7 - 4.5 mg/dL   APTT    Collection Time: 03/26/19  3:45 AM   Result Value Ref Range    aPTT 53.5 (H) 21.0 - 32.0 sec   CBC auto differential    Collection Time: 03/26/19  3:45 AM   Result Value Ref Range    WBC 8.62 3.90 - 12.70 K/uL    RBC 3.50 (L) 4.60 - 6.20 M/uL    Hemoglobin 11.1 (L) 14.0 - 18.0 g/dL    Hematocrit 32.9 (L) 40.0 - 54.0 %    MCV 94 82 - 98 fL    MCH 31.7 (H) 27.0 - 31.0 pg    MCHC 33.7 32.0 - 36.0 g/dL    RDW 15.9 (H) 11.5 - 14.5 %    Platelets 113 (L) 150 - 350 K/uL    MPV 11.2 9.2 - 12.9 fL    Immature Granulocytes 0.9 (H) 0.0 - 0.5 %    Gran # (ANC) 6.9 1.8 - 7.7 K/uL    Immature Grans (Abs) 0.08 (H) 0.00 - 0.04 K/uL    Lymph # 1.1 1.0 - 4.8 K/uL    Mono # 0.5 0.3 - 1.0 K/uL    Eos # 0.0 0.0 - 0.5 K/uL    Baso # 0.01 0.00 - 0.20  K/uL    nRBC 0 0 /100 WBC    Gran% 80.0 (H) 38.0 - 73.0 %    Lymph% 13.1 (L) 18.0 - 48.0 %    Mono% 5.8 4.0 - 15.0 %    Eosinophil% 0.1 0.0 - 8.0 %    Basophil% 0.1 0.0 - 1.9 %    Differential Method Automated    POCT glucose    Collection Time: 03/26/19  8:07 AM   Result Value Ref Range    POCT Glucose 179 (H) 70 - 110 mg/dL   APTT    Collection Time: 03/26/19  9:03 AM   Result Value Ref Range    aPTT 52.2 (H) 21.0 - 32.0 sec         Diagnostic Results     Brain Imaging   Under vessel imaging  Vessel Imaging   VAS US GIOVANY LEGS  Report Summary:  Impression:   Right Leg:  Color flow evaluation of the lower extremity demonstrates fully compressible and patent veins. There is no evidence of venous thrombosis in the deep or superficial veins.  Incidental finding of significant reflux noted in the CFV.    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 There is no evidence of venous thrombosis in the remaining deep veins.    VAS US Carotid Bilaterally  Report Summary:  Impression:   RIGHT SIDE:  Velocities are suggestive of a 40-59% right ICA stenosis; however, it  visually appears to be in the 60-79% range.   Calcification of the right internal carotid artery, which denies complete visualization.   Heterogeneous plaque in the right common carotid artery.   Antegrade flow in the right vertebral artery.   LEFT SIDE:  1 - 39% left ICA stenosis.   Calcification of the left internal carotid artery.   Heterogeneous plaque in the left common carotid artery.   Retrograde flow in the left vertebral artery.    MRI 3/24 Brain Ischemic protocol   Punctate posterior left thalamic acute infarction.  No mass effect or hemorrhage.    Focal decreased signal within the proximal left PCA, suggestive of high-grade stenosis.    Moderate to high-grade narrowing of the intra cavernous/ supraclinoid ICAs bilaterally.    High-grade stenosis distal  left vertebral artery.    CTA STROKE MULTIPHASE 3/24  No acute intracranial hemorrhage or mass effect.  No ischemic changes appreciated on CT.    Extensive calcified atherosclerotic disease resulting:    Left PCA with a high-grade stenosis.    High-grade (>70%) stenosis right proximal ICA and moderate (50-70%) stenosis left proximal ICA.    Moderate to high-grade stenosis of the intra cavernous/ supraclinoid ICAs bilaterally.    High-grade stenosis distal left vertebral artery.    Cardiac Imaging   TRANSTHORACIC ECHO (TTE) COMPLETE 3/23/2019  · Eccentric left ventricular hypertrophy.Normal left ventricular systolic function. The estimated ejection fraction is 55%  · Severe left atrial enlargement.  · Indeterminate left ventricular diastolic function.  · Moderate aortic valve stenosis. Aortic valve area is 1.37 cm2; peak velocity is 2.78 m/s; mean gradient is 22.39 mmHg. Mild aortic regurgitation.  · Mild-to-moderate mitral regurgitation.  · Mild to moderate tricuspid regurgitation.  · The estimated PA systolic pressure is 41 mm Hg  · Severe right atrial enlargement.  · Normal right ventricular systolic function. The right ventricle is mildly dilated.  · Patent foramen ovale present with left to right shunting indicated by color flow Doppler.  · Normal central venous pressure (3 mm Hg).  · Pulmonary hypertension present.

## 2019-03-26 NOTE — ASSESSMENT & PLAN NOTE
Micheal Hunt is a 78 year old man who is ESRD which presented with L PCA infarct     Dialysis Information: [facility]  -Out patient HD unit: [facility]  -Nephrologist: Dr. Valderrama in [place]  -HD tx days: MWF  -HD tx time: 4 hours  -HD access: AVF   -Last HD: yesterday  -EDW: 106.0kg  -RRF: yes      Assessment and plan:  - No need for HD treatment today   - Will reassess tomorrow which will most likely require treatment

## 2019-03-26 NOTE — ASSESSMENT & PLAN NOTE
Stoke risk factor   No ongoing signs/symptoms of ischemia  Continue single Aspirin 81mg and heparin gtt  Continue coreg 25 bid and losartan 100mg  Continue high intensity statin

## 2019-03-26 NOTE — PLAN OF CARE
Problem: Adult Inpatient Plan of Care  Goal: Plan of Care Review  Past Medical History:  7/29/2016: NICK (acute kidney injury)  No date: Allergy  12/15/2014: Anemia, mild  No date: Arthritis      Comment:  Gout  3/27/2012: Benign essential HTN  5/10/2018: BMI 29.0-29.9,adult  No date: BPH (benign prostatic hyperplasia)  No date: BPH (benign prostatic hyperplasia)  2006: CAD (coronary artery disease)  No date: Chronic kidney disease      Comment:  due to ibuprofen  No date: Colon polyp  No date: CRF (chronic renal failure), stage 5  No date: Diverticulosis  No date: Gastritis  No date: GERD (gastroesophageal reflux disease)  No date: Gout  5/3/2018: History of colon polyps  3/27/2012: HTN (hypertension)  No date: Hyperlipidemia  No date: Hyperlipidemia  6/14/2018: LLL pneumonia  No date: LVH (left ventricular hypertrophy)  No date: Mesenteric ischemia  3/27/2012: Murmur, cardiac  No date: GRICELDA (obstructive sleep apnea)      Comment:  DOES NOT USE A MACHINE  No date: Sinus problem  No date: Syncope and collapse      Past Surgical History:  No date: APPENDECTOMY  5/31/2016: BLOCK-NERVE; Left      Comment:  Performed by Kevin Leon MD at [facility] OR  No date: CARDIAC CATHETERIZATION  2011: COLONOSCOPY  5/3/2018: COLONOSCOPY; N/A      Comment:  Performed by Messi Harris MD at [facility] ENDO  9/10/2015: COLONOSCOPY; N/A      Comment:  Performed by Messi Harris MD at [facility] ENDO  4/2007: CORONARY ARTERY BYPASS GRAFT      Comment:  x 1  8/30/2017: CYSTOSCOPY; N/A      Comment:  Performed by Rudy Herring MD at [facility] OR  11/10/2015: CYSTOSCOPY; N/A      Comment:  Performed by Rudy Herring MD at [facility] OR  1/4/2016: ESOPHAGOGASTRODUODENOSCOPY (EGD); N/A      Comment:  Performed by Tra Brooks MD at [facility] ENDO (2ND FLR)  10/15/2014: ESOPHAGOGASTRODUODENOSCOPY (EGD); N/A      Comment:  Performed by Messi Harris MD at [facility] ENDO  2/25/2016: INJECTION-STEROID-EPIDURAL-TRANSFORAMINAL; Left      Comment:  Performed by  Kevin Leon MD at [facility] OR  No date: JOINT REPLACEMENT      Comment:  left knee total replacement  X 3  No date: mid leftt finger      Comment:  from a cactelkin  2016: MOLE REMOVAL  4/11/2016: RADIOFREQUENCY THERMOCOAGULATION (RFTC)-NERVE-MEDIAN   BRANCH-LUMBAR; Left      Comment:  Performed by Kevin Leon MD at [facility] OR  No date: rotative cuff      Comment:  no rotative cuffs on bilat shoulders has pins   No date: SHOULDER SURGERY      Comment:  shoulder surgery bilat  RIGHT X 4; LEFT X 3  11/10/2015: TRANSRECTAL ULTRASOUND GUIDED PROSTATE BIOPSY; Bilateral      Comment:  Performed by Rudy Herring MD at [facility] OR  5/31/2017: ULTRASOUND-ENDOSCOPIC-UPPER; N/A      Comment:  Performed by Tra Brooks MD at [facility] ENDO (2ND FLR)  1/4/2016: ULTRASOUND-ENDOSCOPIC-UPPER; N/A      Comment:  Performed by Tra Brooks MD at [facility] ENDO (2ND FLR)  1/16/2015: ULTRASOUND-ENDOSCOPIC-UPPER; N/A      Comment:  Performed by Jason Saleem MD at [facility] ENDO (2ND FLR)    Patient likes blankets on.    POC reviewed with pt and family at 1400. Pt and family verbalized understanding. Questions and concerns addressed. No acute events today. Pt progressing toward goals. Will continue to monitor. See flowsheets for full assessment and VS info.

## 2019-03-26 NOTE — PROGRESS NOTES
[facility]  Vascular Neurology  Comprehensive Stroke Center  Progress Note    Assessment/Plan:     * Acute ischemic left PCA stroke  Likely embolic, cardiac given his history of Afib and CAD    Patient is not a TPA candidate had symptoms for more than 4.5 hours which was confirmed by present of flare on MRI scan.  Symptoms improved with laying flat, likely flow depended    - keep patient flat, elevate the bed as tolerated in an attempt to graduate him, symptoms may worsen and then suggest patient be returned to supine position   - will need to continue stress test with elevation if patient tolerates     Antithrombotics for secondary stroke prevention: Antiplatelets: Aspirin: 81 mg daily    Statins for secondary stroke prevention and hyperlipidemia, if present:   Statins: Atorvastatin- 80 mg daily    Aggressive risk factor modification: HTN, DM, HLD, A-Fib, CAD     Rehab efforts: PT/OT/SLP to evaluate and treat, PM&R consult     Diagnostics ordered/pending: none    VTE prophylaxis: Heparin gtt     BP parameters: Infarct: No intervention, SBP <220        Paroxysmal atrial fibrillation  chadsvasc 6  stroke risk factor   on coreg for rate control     ESRD (end stage renal disease) on dialysis  Continue scheduled HD   Patient is being followed by nephrology, appreciate their assistance  Patient still makes urine - continue diuresis with torsemide - >1L UOP in last 24hours  Patient tolerated HD with 500ml removed as well as metabolic toxins     Type 2 diabetes mellitus, without long-term current use of insulin  Lab Results   Component Value Date    HGBA1C 8.3 (H) 03/21/2019     Stoke risk factor   managed by primary team   Continue basal insulin with sliding scale per primary service (consider increasing basal with relatively high sliding scale demands)    Long term (current) use of anticoagulants  - continue heparin gtt with asa 81mg     Benign prostatic hyperplasia without lower urinary tract  symptoms  - continue flomax and finasteride    Gout, arthritis  - continue allopurinol, patient would likely benefit to change dosing to post dialysis days only     CAD (coronary artery disease), 2V CABG 2007  Stoke risk factor   No ongoing signs/symptoms of ischemia  Continue single Aspirin 81mg and heparin gtt  Continue coreg 25 bid and losartan 100mg  Continue high intensity statin        Hyperlipidemia, baseline   Lab Results   Component Value Date    LDLCALC 46.4 (L) 07/26/2018   Stoke risk factor   Continue atorvastatin 80 mg       Essential hypertension  Ok with hypertension in the setting of flow dependent stroke symptoms   Continue carvedilol, losartan and torsemide      Carotid artery stenosis  - noted on both CTA and on ultrasound of carotids  - patient evaluated by vascular who agree with maximal medical management         Admitted to hospital medicine for unstable angina eval, vascular neurology consulted for right sided weakness, facial droop and dysarthria, MRI showed L PCA infarction, not candidate for TPA ( symptoms duration >4 hours)patient symptoms improved with laying flat, admitted to neuro critical care. Patient with known PCA infarct on L with undulating symptom pattern with modification to level of recumbency. Patient restarted on heparin gtt. Underwent US of LE and of carotid with evidence of <50% L ICA stenosis and 60-70% R ICA stenosis, heavily calcified on CTA, additionally L vertebral also heavily calcified. Patient tolerated HD last night with 500ml removed. No new neuro deficits.     STROKE DOCUMENTATION        NIH Scale:          Modified [place]    [place] Coma Scale:    ABCD2 Score:    [id number] Score:   HAS -BLED Score:   ICH Score:   Hunt & Sharp Classification:      Hemorrhagic change of an Ischemic Stroke: Does this patient have an ischemic stroke with hemorrhagic changes? No     Neurologic Chief Complaint: right sided weakness    Subjective:     Interval History:  Patient is seen for follow-up neurological assessment and treatment recommendations: see hospital course for interval history    HPI, Past Medical, Family, and Social History remains the same as documented in the initial encounter.     Review of Systems   Constitutional: Negative for chills, fatigue and fever.   HENT: Negative for postnasal drip and sore throat.    Eyes: Negative.    Respiratory: Positive for shortness of breath. Negative for chest tightness.    Cardiovascular: Negative for chest pain, palpitations and leg swelling.   Gastrointestinal: Negative for abdominal pain, blood in stool, constipation and nausea.   Endocrine: Negative.    Genitourinary: Negative for dysuria.   Musculoskeletal: Negative for arthralgias, back pain and joint swelling.   Skin: Negative.    Allergic/Immunologic: Negative.    Neurological: Positive for dizziness, speech difficulty and weakness. Negative for light-headedness and headaches.   Psychiatric/Behavioral: Negative for confusion and dysphoric mood. The patient is not nervous/anxious and is not hyperactive.      Scheduled Meds:   allopurinol  100 mg Oral Daily    aspirin  81 mg Oral Daily    atorvastatin  80 mg Oral Daily    carvedilol  25 mg Oral BID WM    finasteride  5 mg Oral Daily    gabapentin  300 mg Oral QHS    insulin detemir U-100  12 Units Subcutaneous Daily    losartan  100 mg Oral QHS    pantoprazole  40 mg Oral Daily    polyethylene glycol  17 g Oral Daily    senna-docusate 8.6-50 mg  1 tablet Oral BID    tamsulosin  1 capsule Oral Daily    torsemide  20 mg Oral BID     Continuous Infusions:   heparin (porcine) in D5W 14 Units/kg/hr (03/26/19 0905)     PRN Meds:acetaminophen, albuterol-ipratropium, dextrose 50%, dextrose 50%, glucagon (human recombinant), glucose, glucose, heparin (PORCINE), heparin (PORCINE), insulin aspart U-100, nitroGLYCERIN, ondansetron    Objective:     Vital Signs (Most Recent):  Temp: 98.1 °F (36.7 °C) (03/26/19  0705)  Pulse: 76 (03/26/19 0905)  Resp: 17 (03/26/19 0905)  BP: (!) 149/62 (03/26/19 0905)  SpO2: 99 % (03/26/19 0905)  BP Location: Right leg    Vital Signs Range (Last 24H):  Temp:  [98 °F (36.7 °C)-98.9 °F (37.2 °C)]   Pulse:  [67-85]   Resp:  [12-33]   BP: (132-217)/()   SpO2:  [91 %-100 %]   Arterial Line BP: (138-191)/(58-87)   BP Location: Right leg    Physical Exam   Constitutional: He is oriented to person, place, and time. He appears well-developed and well-nourished. He is cooperative.  Non-toxic appearance. He does not have a sickly appearance. He does not appear ill. No distress.   HENT:   Head: Normocephalic and atraumatic.   Eyes: Pupils are equal, round, and reactive to light.   Cardiovascular: S1 normal, S2 normal and intact distal pulses. An irregularly irregular rhythm present.   Murmur heard.  Pulses:       Radial pulses are 2+ on the right side, and 2+ on the left side.        Dorsalis pedis pulses are 2+ on the right side, and 2+ on the left side.   Pulmonary/Chest: He has decreased breath sounds. He has rales.   Abdominal: Soft.   Musculoskeletal: He exhibits no edema.   Neurological: He is alert and oriented to person, place, and time. No cranial nerve deficit or sensory deficit.   Slight facial droop on R   4/5 strength on R UE  5/5 in all other extremities   Some evidence of hemispatial neglect   Dysarthria with errors in language and issues with R field of vision    Skin: Skin is warm and dry.   Nursing note and vitals reviewed.      Neurological Exam:   LOC: alert  Language: No aphasia, Other: higher level word finding difficulty - dysarthria with mild errors in language and challenges in seeing/expressing words/letters in R field of vision   Pupils (CN II, III): PERRL    Laboratory:  Recent Results (from the past 24 hour(s))   APTT    Collection Time: 03/25/19  6:45 PM   Result Value Ref Range    aPTT 34.3 (H) 21.0 - 32.0 sec   POCT glucose    Collection Time: 03/25/19  7:30 PM    Result Value Ref Range    POCT Glucose 253 (H) 70 - 110 mg/dL   POCT glucose    Collection Time: 03/25/19  9:47 PM   Result Value Ref Range    POCT Glucose 136 (H) 70 - 110 mg/dL   Comprehensive metabolic panel    Collection Time: 03/26/19  3:45 AM   Result Value Ref Range    Sodium 138 136 - 145 mmol/L    Potassium 4.3 3.5 - 5.1 mmol/L    Chloride 102 95 - 110 mmol/L    CO2 23 23 - 29 mmol/L    Glucose 191 (H) 70 - 110 mg/dL    BUN, Bld 67 (H) 8 - 23 mg/dL    Creatinine 3.8 (H) 0.5 - 1.4 mg/dL    Calcium 7.6 (L) 8.7 - 10.5 mg/dL    Total Protein 5.3 (L) 6.0 - 8.4 g/dL    Albumin 3.0 (L) 3.5 - 5.2 g/dL    Total Bilirubin 0.9 0.1 - 1.0 mg/dL    Alkaline Phosphatase 92 55 - 135 U/L    AST 18 10 - 40 U/L    ALT 51 (H) 10 - 44 U/L    Anion Gap 13 8 - 16 mmol/L    eGFR if African American 16.4 (A) >60 mL/min/1.73 m^2    eGFR if non  14.2 (A) >60 mL/min/1.73 m^2   Magnesium    Collection Time: 03/26/19  3:45 AM   Result Value Ref Range    Magnesium 2.1 1.6 - 2.6 mg/dL   Phosphorus    Collection Time: 03/26/19  3:45 AM   Result Value Ref Range    Phosphorus 5.4 (H) 2.7 - 4.5 mg/dL   APTT    Collection Time: 03/26/19  3:45 AM   Result Value Ref Range    aPTT 53.5 (H) 21.0 - 32.0 sec   CBC auto differential    Collection Time: 03/26/19  3:45 AM   Result Value Ref Range    WBC 8.62 3.90 - 12.70 K/uL    RBC 3.50 (L) 4.60 - 6.20 M/uL    Hemoglobin 11.1 (L) 14.0 - 18.0 g/dL    Hematocrit 32.9 (L) 40.0 - 54.0 %    MCV 94 82 - 98 fL    MCH 31.7 (H) 27.0 - 31.0 pg    MCHC 33.7 32.0 - 36.0 g/dL    RDW 15.9 (H) 11.5 - 14.5 %    Platelets 113 (L) 150 - 350 K/uL    MPV 11.2 9.2 - 12.9 fL    Immature Granulocytes 0.9 (H) 0.0 - 0.5 %    Gran # (ANC) 6.9 1.8 - 7.7 K/uL    Immature Grans (Abs) 0.08 (H) 0.00 - 0.04 K/uL    Lymph # 1.1 1.0 - 4.8 K/uL    Mono # 0.5 0.3 - 1.0 K/uL    Eos # 0.0 0.0 - 0.5 K/uL    Baso # 0.01 0.00 - 0.20 K/uL    nRBC 0 0 /100 WBC    Gran% 80.0 (H) 38.0 - 73.0 %    Lymph% 13.1 (L) 18.0 - 48.0 %     Mono% 5.8 4.0 - 15.0 %    Eosinophil% 0.1 0.0 - 8.0 %    Basophil% 0.1 0.0 - 1.9 %    Differential Method Automated    POCT glucose    Collection Time: 03/26/19  8:07 AM   Result Value Ref Range    POCT Glucose 179 (H) 70 - 110 mg/dL   APTT    Collection Time: 03/26/19  9:03 AM   Result Value Ref Range    aPTT 52.2 (H) 21.0 - 32.0 sec         Diagnostic Results     Brain Imaging   Under vessel imaging  Vessel Imaging   VAS US GIOVANY LEGS  Report Summary:  Impression:   Right Leg:  Color flow evaluation of the lower extremity demonstrates fully compressible and patent veins. There is no evidence of venous thrombosis in the deep or superficial veins.  Incidental finding of significant reflux noted in the CFV.    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 There is no evidence of venous thrombosis in the remaining deep veins.    VAS US Carotid Bilaterally  Report Summary:  Impression:   RIGHT SIDE:  Velocities are suggestive of a 40-59% right ICA stenosis; however, it  visually appears to be in the 60-79% range.   Calcification of the right internal carotid artery, which denies complete visualization.   Heterogeneous plaque in the right common carotid artery.   Antegrade flow in the right vertebral artery.   LEFT SIDE:  1 - 39% left ICA stenosis.   Calcification of the left internal carotid artery.   Heterogeneous plaque in the left common carotid artery.   Retrograde flow in the left vertebral artery.    MRI 3/24 Brain Ischemic protocol   Punctate posterior left thalamic acute infarction.  No mass effect or hemorrhage.    Focal decreased signal within the proximal left PCA, suggestive of high-grade stenosis.    Moderate to high-grade narrowing of the intra cavernous/ supraclinoid ICAs bilaterally.    High-grade stenosis distal left vertebral artery.    CTA STROKE MULTIPHASE 3/24  No acute intracranial hemorrhage or  mass effect.  No ischemic changes appreciated on CT.    Extensive calcified atherosclerotic disease resulting:    Left PCA with a high-grade stenosis.    High-grade (>70%) stenosis right proximal ICA and moderate (50-70%) stenosis left proximal ICA.    Moderate to high-grade stenosis of the intra cavernous/ supraclinoid ICAs bilaterally.    High-grade stenosis distal left vertebral artery.    Cardiac Imaging   TRANSTHORACIC ECHO (TTE) COMPLETE 3/23/2019  · Eccentric left ventricular hypertrophy.Normal left ventricular systolic function. The estimated ejection fraction is 55%  · Severe left atrial enlargement.  · Indeterminate left ventricular diastolic function.  · Moderate aortic valve stenosis. Aortic valve area is 1.37 cm2; peak velocity is 2.78 m/s; mean gradient is 22.39 mmHg. Mild aortic regurgitation.  · Mild-to-moderate mitral regurgitation.  · Mild to moderate tricuspid regurgitation.  · The estimated PA systolic pressure is 41 mm Hg  · Severe right atrial enlargement.  · Normal right ventricular systolic function. The right ventricle is mildly dilated.  · Patent foramen ovale present with left to right shunting indicated by color flow Doppler.  · Normal central venous pressure (3 mm Hg).  · Pulmonary hypertension present.        Thiago Pickard MD  Comprehensive Stroke Center  Department of Vascular Neurology   [facility]

## 2019-03-26 NOTE — PLAN OF CARE
Problem: Adult Inpatient Plan of Care  Goal: Plan of Care Review  Past Medical History:  7/29/2016: NICK (acute kidney injury)  No date: Allergy  12/15/2014: Anemia, mild  No date: Arthritis      Comment:  Gout  3/27/2012: Benign essential HTN  5/10/2018: BMI 29.0-29.9,adult  No date: BPH (benign prostatic hyperplasia)  No date: BPH (benign prostatic hyperplasia)  2006: CAD (coronary artery disease)  No date: Chronic kidney disease      Comment:  due to ibuprofen  No date: Colon polyp  No date: CRF (chronic renal failure), stage 5  No date: Diverticulosis  No date: Gastritis  No date: GERD (gastroesophageal reflux disease)  No date: Gout  5/3/2018: History of colon polyps  3/27/2012: HTN (hypertension)  No date: Hyperlipidemia  No date: Hyperlipidemia  6/14/2018: LLL pneumonia  No date: LVH (left ventricular hypertrophy)  No date: Mesenteric ischemia  3/27/2012: Murmur, cardiac  No date: GRICELDA (obstructive sleep apnea)      Comment:  DOES NOT USE A MACHINE  No date: Sinus problem  No date: Syncope and collapse      Past Surgical History:  No date: APPENDECTOMY  5/31/2016: BLOCK-NERVE; Left      Comment:  Performed by Kevin Leon MD at [facility] OR  No date: CARDIAC CATHETERIZATION  2011: COLONOSCOPY  5/3/2018: COLONOSCOPY; N/A      Comment:  Performed by Messi Harris MD at [facility] ENDO  9/10/2015: COLONOSCOPY; N/A      Comment:  Performed by Messi Harris MD at [facility] ENDO  4/2007: CORONARY ARTERY BYPASS GRAFT      Comment:  x 1  8/30/2017: CYSTOSCOPY; N/A      Comment:  Performed by Rudy Herring MD at [facility] OR  11/10/2015: CYSTOSCOPY; N/A      Comment:  Performed by Rudy Herring MD at [facility] OR  1/4/2016: ESOPHAGOGASTRODUODENOSCOPY (EGD); N/A      Comment:  Performed by Tra Brooks MD at [facility] ENDO (2ND FLR)  10/15/2014: ESOPHAGOGASTRODUODENOSCOPY (EGD); N/A      Comment:  Performed by Messi Harris MD at [facility] ENDO  2/25/2016: INJECTION-STEROID-EPIDURAL-TRANSFORAMINAL; Left      Comment:  Performed by  Kevin eLon MD at [facility] OR  No date: JOINT REPLACEMENT      Comment:  left knee total replacement  X 3  No date: mid leftt finger      Comment:  from a donovantelkin  2016: MOLE REMOVAL  4/11/2016: RADIOFREQUENCY THERMOCOAGULATION (RFTC)-NERVE-MEDIAN   BRANCH-LUMBAR; Left      Comment:  Performed by Kevin Leon MD at [facility] OR  No date: rotative cuff      Comment:  no rotative cuffs on bilat shoulders has pins   No date: SHOULDER SURGERY      Comment:  shoulder surgery bilat  RIGHT X 4; LEFT X 3  11/10/2015: TRANSRECTAL ULTRASOUND GUIDED PROSTATE BIOPSY; Bilateral      Comment:  Performed by Rudy Herring MD at [facility] OR  5/31/2017: ULTRASOUND-ENDOSCOPIC-UPPER; N/A      Comment:  Performed by Tra Brooks MD at [facility] ENDO (2ND FLR)  1/4/2016: ULTRASOUND-ENDOSCOPIC-UPPER; N/A      Comment:  Performed by Tra Brooks MD at [facility] ENDO (2ND FLR)  1/16/2015: ULTRASOUND-ENDOSCOPIC-UPPER; N/A      Comment:  Performed by Jason Saleem MD at [facility] ENDO (2ND FLR)    Patient likes blankets on.    Outcome: Ongoing (interventions implemented as appropriate)  POC reviewed with pt and daughter at 0500. Pt and daughter verbalized understanding. Questions and concerns addressed. No acute events overnight. Pt progressing toward goals. Will continue to monitor. See flowsheets for full assessment and VS info

## 2019-03-27 LAB
ALBUMIN SERPL BCP-MCNC: 2.6 G/DL (ref 3.5–5.2)
ALBUMIN SERPL BCP-MCNC: 2.6 G/DL (ref 3.5–5.2)
ALP SERPL-CCNC: 79 U/L (ref 55–135)
ALP SERPL-CCNC: 82 U/L (ref 55–135)
ALT SERPL W/O P-5'-P-CCNC: 45 U/L (ref 10–44)
ALT SERPL W/O P-5'-P-CCNC: 47 U/L (ref 10–44)
ANION GAP SERPL CALC-SCNC: 10 MMOL/L (ref 8–16)
ANION GAP SERPL CALC-SCNC: 13 MMOL/L (ref 8–16)
ANION GAP SERPL CALC-SCNC: 9 MMOL/L (ref 8–16)
APTT BLDCRRT: 57.9 SEC (ref 21–32)
AST SERPL-CCNC: 17 U/L (ref 10–40)
AST SERPL-CCNC: 20 U/L (ref 10–40)
BASOPHILS # BLD AUTO: 0.01 K/UL (ref 0–0.2)
BASOPHILS NFR BLD: 0.1 % (ref 0–1.9)
BILIRUB SERPL-MCNC: 0.8 MG/DL (ref 0.1–1)
BILIRUB SERPL-MCNC: 1 MG/DL (ref 0.1–1)
BUN SERPL-MCNC: 53 MG/DL (ref 8–23)
BUN SERPL-MCNC: 83 MG/DL (ref 8–23)
BUN SERPL-MCNC: 86 MG/DL (ref 8–23)
CA-I BLDV-SCNC: 0.88 MMOL/L (ref 1.06–1.42)
CALCIUM SERPL-MCNC: 6.9 MG/DL (ref 8.7–10.5)
CALCIUM SERPL-MCNC: 7 MG/DL (ref 8.7–10.5)
CALCIUM SERPL-MCNC: 7.2 MG/DL (ref 8.7–10.5)
CHLORIDE SERPL-SCNC: 102 MMOL/L (ref 95–110)
CHLORIDE SERPL-SCNC: 104 MMOL/L (ref 95–110)
CHLORIDE SERPL-SCNC: 105 MMOL/L (ref 95–110)
CO2 SERPL-SCNC: 23 MMOL/L (ref 23–29)
CO2 SERPL-SCNC: 23 MMOL/L (ref 23–29)
CO2 SERPL-SCNC: 24 MMOL/L (ref 23–29)
CREAT SERPL-MCNC: 3.5 MG/DL (ref 0.5–1.4)
CREAT SERPL-MCNC: 5 MG/DL (ref 0.5–1.4)
CREAT SERPL-MCNC: 5.5 MG/DL (ref 0.5–1.4)
DIFFERENTIAL METHOD: ABNORMAL
EOSINOPHIL # BLD AUTO: 0.1 K/UL (ref 0–0.5)
EOSINOPHIL NFR BLD: 1 % (ref 0–8)
ERYTHROCYTE [DISTWIDTH] IN BLOOD BY AUTOMATED COUNT: 15.8 % (ref 11.5–14.5)
EST. GFR  (AFRICAN AMERICAN): 10.5 ML/MIN/1.73 M^2
EST. GFR  (AFRICAN AMERICAN): 11.8 ML/MIN/1.73 M^2
EST. GFR  (AFRICAN AMERICAN): 18.1 ML/MIN/1.73 M^2
EST. GFR  (NON AFRICAN AMERICAN): 10.2 ML/MIN/1.73 M^2
EST. GFR  (NON AFRICAN AMERICAN): 15.7 ML/MIN/1.73 M^2
EST. GFR  (NON AFRICAN AMERICAN): 9.1 ML/MIN/1.73 M^2
GLUCOSE SERPL-MCNC: 103 MG/DL (ref 70–110)
GLUCOSE SERPL-MCNC: 158 MG/DL (ref 70–110)
GLUCOSE SERPL-MCNC: 73 MG/DL (ref 70–110)
HCT VFR BLD AUTO: 31 % (ref 40–54)
HGB BLD-MCNC: 10.4 G/DL (ref 14–18)
IMM GRANULOCYTES # BLD AUTO: 0.1 K/UL (ref 0–0.04)
IMM GRANULOCYTES NFR BLD AUTO: 1.1 % (ref 0–0.5)
INR PPP: 1.1 (ref 0.8–1.2)
LYMPHOCYTES # BLD AUTO: 1.4 K/UL (ref 1–4.8)
LYMPHOCYTES NFR BLD: 16.5 % (ref 18–48)
MAGNESIUM SERPL-MCNC: 1.9 MG/DL (ref 1.6–2.6)
MAGNESIUM SERPL-MCNC: 1.9 MG/DL (ref 1.6–2.6)
MCH RBC QN AUTO: 31.9 PG (ref 27–31)
MCHC RBC AUTO-ENTMCNC: 33.5 G/DL (ref 32–36)
MCV RBC AUTO: 95 FL (ref 82–98)
MONOCYTES # BLD AUTO: 0.7 K/UL (ref 0.3–1)
MONOCYTES NFR BLD: 8.3 % (ref 4–15)
NEUTROPHILS # BLD AUTO: 6.4 K/UL (ref 1.8–7.7)
NEUTROPHILS NFR BLD: 73 % (ref 38–73)
NRBC BLD-RTO: 0 /100 WBC
PHOSPHATE SERPL-MCNC: 5.5 MG/DL (ref 2.7–4.5)
PLATELET # BLD AUTO: 101 K/UL (ref 150–350)
PMV BLD AUTO: 10.5 FL (ref 9.2–12.9)
POCT GLUCOSE: 113 MG/DL (ref 70–110)
POCT GLUCOSE: 116 MG/DL (ref 70–110)
POCT GLUCOSE: 173 MG/DL (ref 70–110)
POCT GLUCOSE: 95 MG/DL (ref 70–110)
POTASSIUM SERPL-SCNC: 3.4 MMOL/L (ref 3.5–5.1)
POTASSIUM SERPL-SCNC: 4.1 MMOL/L (ref 3.5–5.1)
POTASSIUM SERPL-SCNC: 4.2 MMOL/L (ref 3.5–5.1)
PROT SERPL-MCNC: 4.7 G/DL (ref 6–8.4)
PROT SERPL-MCNC: 4.7 G/DL (ref 6–8.4)
PROTHROMBIN TIME: 11.4 SEC (ref 9–12.5)
RBC # BLD AUTO: 3.26 M/UL (ref 4.6–6.2)
SODIUM SERPL-SCNC: 135 MMOL/L (ref 136–145)
SODIUM SERPL-SCNC: 138 MMOL/L (ref 136–145)
SODIUM SERPL-SCNC: 140 MMOL/L (ref 136–145)
WBC # BLD AUTO: 8.72 K/UL (ref 3.9–12.7)

## 2019-03-27 PROCEDURE — 25000003 PHARM REV CODE 250: Performed by: HOSPITALIST

## 2019-03-27 PROCEDURE — 20000000 HC ICU ROOM

## 2019-03-27 PROCEDURE — 83735 ASSAY OF MAGNESIUM: CPT

## 2019-03-27 PROCEDURE — 82330 ASSAY OF CALCIUM: CPT

## 2019-03-27 PROCEDURE — 99291 PR CRITICAL CARE, E/M 30-74 MINUTES: ICD-10-PCS | Mod: GC,,, | Performed by: PSYCHIATRY & NEUROLOGY

## 2019-03-27 PROCEDURE — 85730 THROMBOPLASTIN TIME PARTIAL: CPT

## 2019-03-27 PROCEDURE — 25000003 PHARM REV CODE 250: Performed by: INTERNAL MEDICINE

## 2019-03-27 PROCEDURE — 99233 PR SUBSEQUENT HOSPITAL CARE,LEVL III: ICD-10-PCS | Mod: GC,,, | Performed by: PSYCHIATRY & NEUROLOGY

## 2019-03-27 PROCEDURE — 80053 COMPREHEN METABOLIC PANEL: CPT

## 2019-03-27 PROCEDURE — 85610 PROTHROMBIN TIME: CPT

## 2019-03-27 PROCEDURE — 94761 N-INVAS EAR/PLS OXIMETRY MLT: CPT

## 2019-03-27 PROCEDURE — 92523 SPEECH SOUND LANG COMPREHEN: CPT

## 2019-03-27 PROCEDURE — 80100014 HC HEMODIALYSIS 1:1

## 2019-03-27 PROCEDURE — 80053 COMPREHEN METABOLIC PANEL: CPT | Mod: 91

## 2019-03-27 PROCEDURE — 99233 SBSQ HOSP IP/OBS HIGH 50: CPT | Mod: GC,,, | Performed by: INTERNAL MEDICINE

## 2019-03-27 PROCEDURE — 27000221 HC OXYGEN, UP TO 24 HOURS

## 2019-03-27 PROCEDURE — 99233 PR SUBSEQUENT HOSPITAL CARE,LEVL III: ICD-10-PCS | Mod: GC,,, | Performed by: INTERNAL MEDICINE

## 2019-03-27 PROCEDURE — 83735 ASSAY OF MAGNESIUM: CPT | Mod: 91

## 2019-03-27 PROCEDURE — 99233 SBSQ HOSP IP/OBS HIGH 50: CPT | Mod: GC,,, | Performed by: PSYCHIATRY & NEUROLOGY

## 2019-03-27 PROCEDURE — 85025 COMPLETE CBC W/AUTO DIFF WBC: CPT

## 2019-03-27 PROCEDURE — 25000003 PHARM REV CODE 250: Performed by: STUDENT IN AN ORGANIZED HEALTH CARE EDUCATION/TRAINING PROGRAM

## 2019-03-27 PROCEDURE — 99291 CRITICAL CARE FIRST HOUR: CPT | Mod: GC,,, | Performed by: PSYCHIATRY & NEUROLOGY

## 2019-03-27 PROCEDURE — 80048 BASIC METABOLIC PNL TOTAL CA: CPT

## 2019-03-27 PROCEDURE — 84100 ASSAY OF PHOSPHORUS: CPT

## 2019-03-27 PROCEDURE — 63600175 PHARM REV CODE 636 W HCPCS: Performed by: UROLOGY

## 2019-03-27 RX ORDER — SODIUM CHLORIDE 9 MG/ML
INJECTION, SOLUTION INTRAVENOUS ONCE
Status: DISCONTINUED | OUTPATIENT
Start: 2019-03-27 | End: 2019-03-29

## 2019-03-27 RX ORDER — WARFARIN SODIUM 5 MG/1
5 TABLET ORAL DAILY
Status: DISCONTINUED | OUTPATIENT
Start: 2019-03-27 | End: 2019-03-31

## 2019-03-27 RX ORDER — SODIUM CHLORIDE 9 MG/ML
INJECTION, SOLUTION INTRAVENOUS
Status: DISCONTINUED | OUTPATIENT
Start: 2019-03-27 | End: 2019-03-29

## 2019-03-27 RX ADMIN — TAMSULOSIN HYDROCHLORIDE 0.4 MG: 0.4 CAPSULE ORAL at 08:03

## 2019-03-27 RX ADMIN — POLYETHYLENE GLYCOL 3350 17 G: 17 POWDER, FOR SOLUTION ORAL at 08:03

## 2019-03-27 RX ADMIN — STANDARDIZED SENNA CONCENTRATE AND DOCUSATE SODIUM 1 TABLET: 8.6; 5 TABLET, FILM COATED ORAL at 08:03

## 2019-03-27 RX ADMIN — INSULIN DETEMIR 12 UNITS: 100 INJECTION, SOLUTION SUBCUTANEOUS at 08:03

## 2019-03-27 RX ADMIN — GABAPENTIN 300 MG: 300 CAPSULE ORAL at 08:03

## 2019-03-27 RX ADMIN — LOSARTAN POTASSIUM 100 MG: 50 TABLET, FILM COATED ORAL at 08:03

## 2019-03-27 RX ADMIN — INSULIN ASPART 2 UNITS: 100 INJECTION, SOLUTION INTRAVENOUS; SUBCUTANEOUS at 11:03

## 2019-03-27 RX ADMIN — CARVEDILOL 50 MG: 25 TABLET, FILM COATED ORAL at 05:03

## 2019-03-27 RX ADMIN — FINASTERIDE 5 MG: 5 TABLET, FILM COATED ORAL at 08:03

## 2019-03-27 RX ADMIN — WARFARIN SODIUM 5 MG: 5 TABLET ORAL at 05:03

## 2019-03-27 RX ADMIN — ALLOPURINOL 100 MG: 100 TABLET ORAL at 08:03

## 2019-03-27 RX ADMIN — ASPIRIN 81 MG: 81 TABLET, COATED ORAL at 08:03

## 2019-03-27 RX ADMIN — CARVEDILOL 50 MG: 25 TABLET, FILM COATED ORAL at 08:03

## 2019-03-27 RX ADMIN — HEPARIN SODIUM AND DEXTROSE 14 UNITS/KG/HR: 10000; 5 INJECTION INTRAVENOUS at 10:03

## 2019-03-27 RX ADMIN — TORSEMIDE 20 MG: 20 TABLET ORAL at 08:03

## 2019-03-27 RX ADMIN — PANTOPRAZOLE SODIUM 40 MG: 40 TABLET, DELAYED RELEASE ORAL at 08:03

## 2019-03-27 RX ADMIN — ATORVASTATIN CALCIUM 80 MG: 20 TABLET, FILM COATED ORAL at 08:03

## 2019-03-27 RX ADMIN — TORSEMIDE 20 MG: 20 TABLET ORAL at 10:03

## 2019-03-27 NOTE — PLAN OF CARE
Problem: Adult Inpatient Plan of Care  Goal: Plan of Care Review  Past Medical History:  7/29/2016: NICK (acute kidney injury)  No date: Allergy  12/15/2014: Anemia, mild  No date: Arthritis      Comment:  Gout  3/27/2012: Benign essential HTN  5/10/2018: BMI 29.0-29.9,adult  No date: BPH (benign prostatic hyperplasia)  No date: BPH (benign prostatic hyperplasia)  2006: CAD (coronary artery disease)  No date: Chronic kidney disease      Comment:  due to ibuprofen  No date: Colon polyp  No date: CRF (chronic renal failure), stage 5  No date: Diverticulosis  No date: Gastritis  No date: GERD (gastroesophageal reflux disease)  No date: Gout  5/3/2018: History of colon polyps  3/27/2012: HTN (hypertension)  No date: Hyperlipidemia  No date: Hyperlipidemia  6/14/2018: LLL pneumonia  No date: LVH (left ventricular hypertrophy)  No date: Mesenteric ischemia  3/27/2012: Murmur, cardiac  No date: GRICELDA (obstructive sleep apnea)      Comment:  DOES NOT USE A MACHINE  No date: Sinus problem  No date: Syncope and collapse      Past Surgical History:  No date: APPENDECTOMY  5/31/2016: BLOCK-NERVE; Left      Comment:  Performed by Kevin Leon MD at [facility] OR  No date: CARDIAC CATHETERIZATION  2011: COLONOSCOPY  5/3/2018: COLONOSCOPY; N/A      Comment:  Performed by Messi Harris MD at [facility] ENDO  9/10/2015: COLONOSCOPY; N/A      Comment:  Performed by Messi Harris MD at [facility] ENDO  4/2007: CORONARY ARTERY BYPASS GRAFT      Comment:  x 1  8/30/2017: CYSTOSCOPY; N/A      Comment:  Performed by Rudy Herring MD at [facility] OR  11/10/2015: CYSTOSCOPY; N/A      Comment:  Performed by Rudy Herring MD at [facility] OR  1/4/2016: ESOPHAGOGASTRODUODENOSCOPY (EGD); N/A      Comment:  Performed by Tra Brooks MD at [facility] ENDO (2ND FLR)  10/15/2014: ESOPHAGOGASTRODUODENOSCOPY (EGD); N/A      Comment:  Performed by Messi Harris MD at [facility] ENDO  2/25/2016: INJECTION-STEROID-EPIDURAL-TRANSFORAMINAL; Left      Comment:  Performed by  Kevin Leon MD at [facility] OR  No date: JOINT REPLACEMENT      Comment:  left knee total replacement  X 3  No date: mid leftt finger      Comment:  from a cactelkin  2016: MOLE REMOVAL  4/11/2016: RADIOFREQUENCY THERMOCOAGULATION (RFTC)-NERVE-MEDIAN   BRANCH-LUMBAR; Left      Comment:  Performed by Kevin Leon MD at [facility] OR  No date: rotative cuff      Comment:  no rotative cuffs on bilat shoulders has pins   No date: SHOULDER SURGERY      Comment:  shoulder surgery bilat  RIGHT X 4; LEFT X 3  11/10/2015: TRANSRECTAL ULTRASOUND GUIDED PROSTATE BIOPSY; Bilateral      Comment:  Performed by Rudy Herring MD at [facility] OR  5/31/2017: ULTRASOUND-ENDOSCOPIC-UPPER; N/A      Comment:  Performed by Tra Brooks MD at [facility] ENDO (2ND FLR)  1/4/2016: ULTRASOUND-ENDOSCOPIC-UPPER; N/A      Comment:  Performed by Tra Brooks MD at [facility] ENDO (2ND FLR)  1/16/2015: ULTRASOUND-ENDOSCOPIC-UPPER; N/A      Comment:  Performed by Jason Saleem MD at [facility] ENDO (2ND FLR)    Patient likes blankets on.    POC reviewed with pt and family at 1400. Pt and family verbalized understanding. Questions and concerns addressed. No acute events today. Pt progressing toward goals. Hepain gtt therapeutic at 14 units/kg/hr. Will continue to monitor. See flowsheets for full assessment and VS info.

## 2019-03-27 NOTE — PROGRESS NOTES
2100 neuro exam reveals that patient is only oriented to self, when previously oriented to everything except time. Lola, NP notified and reported to bedside to assess. Awaiting orders. Will continue to monitor.

## 2019-03-27 NOTE — PROGRESS NOTES
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 NET negative 325 cc yesterday.      Review of patient's allergies indicates:   Allergen Reactions    Ace inhibitors Other (See Comments)     Cough    Arb-angiotensin receptor antagonist Itching    Eplerenone Other (See Comments)     Marked bradycardia, 40, tiredness and weakness      Sulfa (sulfonamide antibiotics) Itching     Patient says this was 10 years ago and doesn't remember what happened     Current Facility-Administered Medications   Medication Frequency    0.9%  NaCl infusion PRN    0.9%  NaCl infusion Once    acetaminophen tablet 650 mg Q6H PRN    albuterol-ipratropium 2.5 mg-0.5 mg/3 mL nebulizer solution 3 mL Q4H PRN    allopurinol tablet 100 mg Daily    aspirin EC tablet 81 mg Daily    atorvastatin tablet 80 mg Daily    carvedilol tablet 50 mg BID WM    dextrose 50% injection 12.5 g PRN    dextrose 50% injection 25 g PRN    finasteride tablet 5 mg Daily    gabapentin capsule 300 mg QHS    glucagon (human recombinant) injection 1 mg PRN    glucose chewable tablet 16 g PRN    glucose chewable tablet 24 g PRN    heparin 25,000 units in dextrose 5% (100 units/ml) IV bolus from bag - ADDITIONAL PRN BOLUS - 30 units/kg (max bolus 4000 units) PRN     heparin 25,000 units in dextrose 5% (100 units/ml) IV bolus from bag - ADDITIONAL PRN BOLUS - 60 units/kg (max bolus 4000 units) PRN    heparin 25,000 units in dextrose 5% 250 mL (100 units/mL) infusion LOW INTENSITY nomogram - [facility] Continuous    insulin aspart U-100 pen 1-10 Units QID (AC + HS) PRN    insulin detemir U-100 pen 12 Units Daily    losartan tablet 100 mg QHS    nitroGLYCERIN SL tablet 0.4 mg Q5 Min PRN    ondansetron tablet 8 mg Q6H PRN    pantoprazole EC tablet 40 mg Daily    polyethylene glycol packet 17 g Daily    senna-docusate 8.6-50 mg per tablet 1 tablet BID    tamsulosin 24 hr capsule 0.4 mg Daily    torsemide tablet 20 mg BID       Objective:     Vital Signs (Most Recent):  Temp: 98.4 °F (36.9 °C) (03/27/19 0705)  Pulse: 75 (03/27/19 0838)  Resp: 14 (03/27/19 0838)  BP: (!) 165/75 (03/27/19 0805)  SpO2: 99 % (03/27/19 0838)  O2 Device (Oxygen Therapy): nasal cannula (03/27/19 0838) Vital Signs (24h Range):  Temp:  [98 °F (36.7 °C)-98.4 °F (36.9 °C)] 98.4 °F (36.9 °C)  Pulse:  [67-87] 75  Resp:  [14-31] 14  SpO2:  [90 %-100 %] 99 %  BP: (142-192)/() 165/75  Arterial Line BP: (138-188)/(58-91) 159/77     Weight: 106.6 kg (235 lb 0.2 oz) (03/24/19 0600)  Body mass index is 31.01 kg/m².  Body surface area is 2.34 meters squared.    I/O last 3 completed shifts:  In: 1144.5 [I.V.:644.5; Other:500]  Out: 2300 [Urine:1300; Other:1000]    Physical Exam   Constitutional: No distress. Nasal cannula in place.   HENT:   Head: Normocephalic.   Right Ear: External ear normal.   Eyes: Right eye exhibits no discharge. Left eye exhibits no discharge.   Cardiovascular: An irregular rhythm present.   Pulmonary/Chest: Effort normal. No respiratory distress.   Abdominal: Soft.   Neurological: He is alert.   Skin: Skin is warm.       Significant Labs:  ABGs:   Recent Labs   Lab 03/24/19  1419   PH 7.397   PCO2 31.7*   HCO3 19.5*   POCSATURATED 97   BE -5     BMP:   Recent Labs   Lab 03/27/19  0231    GLU 73      CO2 23   BUN 83*   CREATININE 5.0*   CALCIUM 7.0*   MG 1.9     CBC:   Recent Labs   Lab 03/27/19  0443   WBC 8.72   RBC 3.26*   HGB 10.4*   HCT 31.0*   *   MCV 95   MCH 31.9*   MCHC 33.5     CMP:   Recent Labs   Lab 03/27/19  0231   GLU 73   CALCIUM 7.0*   ALBUMIN 2.6*   PROT 4.7*      K 4.2   CO2 23      BUN 83*   CREATININE 5.0*   ALKPHOS 79   ALT 47*   AST 20   BILITOT 1.0     All labs within the past 24 hours have been reviewed.       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 Valderrama in [place]  -HD tx days: MWF  -HD tx time: 4 hours  -HD access: AVF   -Last HD: yesterday  -EDW: 106.0kg  -RRF: yes      Assessment and plan:  - will schedule fo HD treat ment for today for clearance and volume removal, duration 3 hrs and no UF, bath adjusted to chem.         Wilfred Pittman  Nephrology  Fellow  [facility]    Pager [phone]

## 2019-03-27 NOTE — PLAN OF CARE
Problem: SLP Goal  Goal: SLP Goal  Goals to be met 4/1  1 pt will tolerate regular diet and thin liquids  2 pt will participate in speech lang eval     Outcome: Ongoing (interventions implemented as appropriate)  Evaluation completed  Ayana Borja MA/RIMA-SLP  Speech Language Pathologist  Pager [phone]  3/27/2019

## 2019-03-27 NOTE — PLAN OF CARE
SW met with Pt daughter outside of Pt room regarding questions about DC planning and inPt options after DC. [place] He lives alone. Her sister lives in [place] and she lives in [place]. Discussed rehab vs SNF and provided rehab list. Discussed dialysis and transportation. Reported therapy has been ordered for tomorrow so the recs will help with the dc plan. SW to advise them of the recs after therapy and the MD team has seen the Pt. If recs are for SNF, additional list will be provided.    Latasha Nieves, PATRICK  Neurocritical Care   [facility]  [phone]

## 2019-03-27 NOTE — PT/OT/SLP EVAL
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    Recommendations:     Recommendations:                General Recommendations:  Cognitive-linguistic therapy  Diet recommendations:  Regular, Thin   Aspiration Precautions: Standard aspiration precautions   General Precautions: Standard, aspiration  Communication strategies:  none    History:     Past Medical History:   Diagnosis Date    NICK (acute kidney injury) 7/29/2016    Allergy     Anemia, mild 12/15/2014    Arthritis     Gout    Benign essential HTN 3/27/2012    BMI 29.0-29.9,adult 5/10/2018    BPH (benign prostatic hyperplasia)     BPH (benign prostatic hyperplasia)     CAD (coronary artery disease) 2006    Chronic kidney disease     due to ibuprofen    Colon polyp     CRF (chronic renal failure), stage 5     Diverticulosis     Gastritis     GERD (gastroesophageal reflux disease)     Gout     History of colon polyps 5/3/2018    HTN (hypertension) 3/27/2012    Hyperlipidemia     Hyperlipidemia     LLL pneumonia 6/14/2018    LVH (left ventricular hypertrophy)     Mesenteric ischemia     Murmur, cardiac 3/27/2012    GRICELDA (obstructive sleep apnea)     DOES NOT USE A MACHINE    Sinus problem     Syncope and collapse        Past Surgical History:   Procedure Laterality Date    APPENDECTOMY      BLOCK-NERVE Left 5/31/2016    [facility] OR    CARDIAC CATHETERIZATION      COLONOSCOPY  2011    COLONOSCOPY N/A 5/3/2018    [facility]    COLONOSCOPY N/A 9/10/2015    [facility]    CORONARY ARTERY BYPASS GRAFT  4/2007    x 1    CYSTOSCOPY N/A 8/30/2017    [facility] at [facility] OR    CYSTOSCOPY N/A 11/10/2015    [facility] at [facility] OR     "ESOPHAGOGASTRODUODENOSCOPY (EGD) N/A 1/4/2016    Performed by Tra [facility] at [facility] ENDO (2ND FLR)    ESOPHAGOGASTRODUODENOSCOPY (EGD) N/A 10/15/2014    [facility] at [facility] ENDO    INJECTION-STEROID-EPIDURAL-TRANSFORAMINAL Left 2/25/2016    [facility] OR    JOINT REPLACEMENT      left knee total replacement  X 3    mid leftt finger      from a cactuss    MOLE REMOVAL  2016    RADIOFREQUENCY THERMOCOAGULATION (RFTC)-NERVE-MEDIAN BRANCH-LUMBAR Left 4/11/2016    [facility] OR    rotative cuff      no rotative cuffs on bilat shoulders has pins     SHOULDER SURGERY      shoulder surgery bilat  RIGHT X 4; LEFT X 3    TRANSRECTAL ULTRASOUND GUIDED PROSTATE BIOPSY Bilateral 11/10/2015    [facility] OR    ULTRASOUND-ENDOSCOPIC-UPPER N/A 5/31/2017    Performed by [facility] at [facility] ENDO (2ND FLR)    ULTRASOUND-ENDOSCOPIC-UPPER N/A 1/4/2016    Performed by Tra [facility] at [facility] ENDO (2ND FLR)    ULTRASOUND-ENDOSCOPIC-UPPER N/A 1/16/2015    Performed by Jason Saleem MD at [facility] ENDO (2ND FLR)       Social History: Patient lives with family.      Prior diet: regular with thin      Subjective     "I get messed up" per pt  Patient goals: did not state    Pain/Comfort:  · Pain Rating 1: 0/10  · Pain Rating Post-Intervention 1: 0/10    Objective:   Cognitive Status:    Pt. was oriented  To month and year only confusing am and pm.  Recall of recent and remote temporal and general information was poor with delays in responding noted.    Immediate/delayed verbal recall was impaired with pt. Repeating 5 digits and 4 words without difficulty.    Responses to hypothetical verbal problem solving tasks were accurate for simple tasks .  Pt. Did not accurately Compare and contrast objects and did not generate multiple solutions to problems.  Thought organization and categorization skills were  Impaired with pt. " Naming 3 animals given one minute when 15-20 are wnl.        Receptive Language:   Comprehension:   He followed simple command with 100% accuracy and 2 step commands with 25% accuracy.  Pt. Responded to simple yes/no questions accurately and complex yes/no questions with 60% accuracy    Pragmatics:    Poor eye contact  Delays in responding  Flat affect    Expressive Language:  Verbal:  He recalled nouns in response to questions and named common objects with 100% accuracy      Motor Speech:  Mild-mod dysarthria with occasional need for repetition to effectively communicate message  Voice:   wfl    Visual-Spatial:  tba    Reading:   tba     Written Expression:   tba    Treatment: Pt. Was observed swallowing water via cup with no s/s of aspiration    Assessment:   Micheal Hunt is a 78 y.o. male with an SLP diagnosis of Dysarthria and Cognitive-Linguistic Impairment.     Goals:   Multidisciplinary Problems     SLP Goals        Problem: SLP Goal    Goal Priority Disciplines Outcome   SLP Goal     SLP Ongoing (interventions implemented as appropriate)   Description:  Goals to be met 4/1  1 pt will tolerate regular diet and thin liquids/met  2 pt will participate in speech lang eval/met  3.  [place] to time and place  4.  Respond to categorization tasks with 80% accuracy  5.  Assess functional reading and writing skills  6.  Respond to problem solving tasks with 80% accuracy                       Plan:   · Patient to be seen:  4 x/week   · Plan of Care expires:  04/24/19  · Plan of Care reviewed with:  patient, daughter   · SLP Follow-Up:  Yes       Discharge recommendations:  Discharge Facility/Level of Care Needs: rehabilitation facility       Time Tracking:   SLP Treatment Date:   03/27/19  Speech Start Time:  0720  Speech Stop Time:  0740     Speech Total Time (min):  20 min    Billable Minutes: Eval 12  and Treatment Swallowing Dysfunction 8    Ayana Borja MA, CCC-SLP  03/27/2019

## 2019-03-27 NOTE — ASSESSMENT & PLAN NOTE
Micheal Hunt is a 78 year old man who is ESRD which presented with L PCA infarct     Dialysis Information: iHD  -Out patient HD unit: [facility]  -Nephrologist: Dr. Valderrama in [place]  -HD tx days: MWF  -HD tx time: 4 hours  -HD access: AVF   -Last HD: yesterday  -EDW: 106.0kg  -RRF: yes      Assessment and plan:  - will schedule fo HD treat ment for today for clearance and volume removal, duration 3 hrs and no UF, bath adjusted to chem.

## 2019-03-27 NOTE — SUBJECTIVE & OBJECTIVE
Interval History:   Patient evaluated at bedside, no significant event overnight. NET negative 325 cc yesterday.      Review of patient's allergies indicates:   Allergen Reactions    Ace inhibitors Other (See Comments)     Cough    Arb-angiotensin receptor antagonist Itching    Eplerenone Other (See Comments)     Marked bradycardia, 40, tiredness and weakness      Sulfa (sulfonamide antibiotics) Itching     Patient says this was 10 years ago and doesn't remember what happened     Current Facility-Administered Medications   Medication Frequency    0.9%  NaCl infusion PRN    0.9%  NaCl infusion Once    acetaminophen tablet 650 mg Q6H PRN    albuterol-ipratropium 2.5 mg-0.5 mg/3 mL nebulizer solution 3 mL Q4H PRN    allopurinol tablet 100 mg Daily    aspirin EC tablet 81 mg Daily    atorvastatin tablet 80 mg Daily    carvedilol tablet 50 mg BID WM    dextrose 50% injection 12.5 g PRN    dextrose 50% injection 25 g PRN    finasteride tablet 5 mg Daily    gabapentin capsule 300 mg QHS    glucagon (human recombinant) injection 1 mg PRN    glucose chewable tablet 16 g PRN    glucose chewable tablet 24 g PRN    heparin 25,000 units in dextrose 5% (100 units/ml) IV bolus from bag - ADDITIONAL PRN BOLUS - 30 units/kg (max bolus 4000 units) PRN    heparin 25,000 units in dextrose 5% (100 units/ml) IV bolus from bag - ADDITIONAL PRN BOLUS - 60 units/kg (max bolus 4000 units) PRN    heparin 25,000 units in dextrose 5% 250 mL (100 units/mL) infusion LOW INTENSITY nomogram - [facility] Continuous    insulin aspart U-100 pen 1-10 Units QID (AC + HS) PRN    insulin detemir U-100 pen 12 Units Daily    losartan tablet 100 mg QHS    nitroGLYCERIN SL tablet 0.4 mg Q5 Min PRN    ondansetron tablet 8 mg Q6H PRN    pantoprazole EC tablet 40 mg Daily    polyethylene glycol packet 17 g Daily    senna-docusate 8.6-50 mg per tablet 1 tablet BID    tamsulosin 24 hr capsule 0.4 mg Daily    torsemide tablet 20 mg BID        Objective:     Vital Signs (Most Recent):  Temp: 98.4 °F (36.9 °C) (03/27/19 0705)  Pulse: 75 (03/27/19 0838)  Resp: 14 (03/27/19 0838)  BP: (!) 165/75 (03/27/19 0805)  SpO2: 99 % (03/27/19 0838)  O2 Device (Oxygen Therapy): nasal cannula (03/27/19 0838) Vital Signs (24h Range):  Temp:  [98 °F (36.7 °C)-98.4 °F (36.9 °C)] 98.4 °F (36.9 °C)  Pulse:  [67-87] 75  Resp:  [14-31] 14  SpO2:  [90 %-100 %] 99 %  BP: (142-192)/() 165/75  Arterial Line BP: (138-188)/(58-91) 159/77     Weight: 106.6 kg (235 lb 0.2 oz) (03/24/19 0600)  Body mass index is 31.01 kg/m².  Body surface area is 2.34 meters squared.    I/O last 3 completed shifts:  In: 1144.5 [I.V.:644.5; Other:500]  Out: 2300 [Urine:1300; Other:1000]    Physical Exam   Constitutional: No distress. Nasal cannula in place.   HENT:   Head: Normocephalic.   Right Ear: External ear normal.   Eyes: Right eye exhibits no discharge. Left eye exhibits no discharge.   Cardiovascular: An irregular rhythm present.   Pulmonary/Chest: Effort normal. No respiratory distress.   Abdominal: Soft.   Neurological: He is alert.   Skin: Skin is warm.       Significant Labs:  ABGs:   Recent Labs   Lab 03/24/19  1419   PH 7.397   PCO2 31.7*   HCO3 19.5*   POCSATURATED 97   BE -5     BMP:   Recent Labs   Lab 03/27/19  0231   GLU 73      CO2 23   BUN 83*   CREATININE 5.0*   CALCIUM 7.0*   MG 1.9     CBC:   Recent Labs   Lab 03/27/19  0443   WBC 8.72   RBC 3.26*   HGB 10.4*   HCT 31.0*   *   MCV 95   MCH 31.9*   MCHC 33.5     CMP:   Recent Labs   Lab 03/27/19  0231   GLU 73   CALCIUM 7.0*   ALBUMIN 2.6*   PROT 4.7*      K 4.2   CO2 23      BUN 83*   CREATININE 5.0*   ALKPHOS 79   ALT 47*   AST 20   BILITOT 1.0     All labs within the past 24 hours have been reviewed.

## 2019-03-27 NOTE — ASSESSMENT & PLAN NOTE
Likely embolic, cardiac given his history of Afib and CAD but also possible that it was formed from in situ thrombosis at area of significant plaque/calcification.   Interval MRI with expected extension of infarct within the left PCA territory involving thalamocapsular region as well as medial temporal lobe correlating to his worsening symptoms of thalamic aphasia and visual defect and worsening strength in R UE now 2/5 in RUE     Antithrombotics for secondary stroke prevention: Antiplatelets: Aspirin: 81 mg daily    Statins for secondary stroke prevention and hyperlipidemia, if present:   Statins: Atorvastatin- 80 mg daily    Aggressive risk factor modification: HTN, DM, HLD, A-Fib, CAD     Rehab efforts: PT/OT/SLP to evaluate and treat, PM&R consult     Diagnostics ordered/pending: none    VTE prophylaxis: Heparin gtt     BP parameters: Infarct: No intervention, SBP <220

## 2019-03-27 NOTE — PLAN OF CARE
Problem: Adult Inpatient Plan of Care  Goal: Plan of Care Review  Past Medical History:  7/29/2016: NICK (acute kidney injury)  No date: Allergy  12/15/2014: Anemia, mild  No date: Arthritis      Comment:  Gout  3/27/2012: Benign essential HTN  5/10/2018: BMI 29.0-29.9,adult  No date: BPH (benign prostatic hyperplasia)  No date: BPH (benign prostatic hyperplasia)  2006: CAD (coronary artery disease)  No date: Chronic kidney disease      Comment:  due to ibuprofen  No date: Colon polyp  No date: CRF (chronic renal failure), stage 5  No date: Diverticulosis  No date: Gastritis  No date: GERD (gastroesophageal reflux disease)  No date: Gout  5/3/2018: History of colon polyps  3/27/2012: HTN (hypertension)  No date: Hyperlipidemia  No date: Hyperlipidemia  6/14/2018: LLL pneumonia  No date: LVH (left ventricular hypertrophy)  No date: Mesenteric ischemia  3/27/2012: Murmur, cardiac  No date: GRICELDA (obstructive sleep apnea)      Comment:  DOES NOT USE A MACHINE  No date: Sinus problem  No date: Syncope and collapse      Past Surgical History:  No date: APPENDECTOMY  5/31/2016: BLOCK-NERVE; Left      Comment:  Performed by [facility] Leon [facility] at [facility] OR  No date: CARDIAC CATHETERIZATION  2011: COLONOSCOPY  5/3/2018: COLONOSCOPY; N/A      Comment:  Performed by Messi Harris MD at [facility] ENDO  9/10/2015: COLONOSCOPY; N/A      Comment:  Performed by Messi Harris MD at [facility] ENDO  4/2007: CORONARY ARTERY BYPASS GRAFT      Comment:  x 1  8/30/2017: CYSTOSCOPY; N/A      Comment:  Performed by Rudy Herring MD at [facility] OR  11/10/2015: CYSTOSCOPY; N/A      Comment:  Performed by Rudy Herring MD at [facility] OR  1/4/2016: ESOPHAGOGASTRODUODENOSCOPY (EGD); N/A      Comment:  Performed by Tra Brooks MD at [facility] ENDO (2ND FLR)  10/15/2014: ESOPHAGOGASTRODUODENOSCOPY (EGD); N/A      Comment:  Performed by Messi Harris MD at [facility] ENDO  2/25/2016: INJECTION-STEROID-EPIDURAL-TRANSFORAMINAL; Left      Comment:  Performed by  [facility] at [facility] OR  No date: JOINT REPLACEMENT      Comment:  left knee total replacement  X 3  No date: mid leftt finger      Comment:  from a donovantelkin  2016: MOLE REMOVAL  4/11/2016: RADIOFREQUENCY THERMOCOAGULATION (RFTC)-NERVE-MEDIAN   BRANCH-LUMBAR; Left      Comment:  Performed by Kevin Leon MD at [facility] OR  No date: rotative cuff      Comment:  no rotative cuffs on bilat shoulders has pins   No date: SHOULDER SURGERY      Comment:  shoulder surgery bilat  RIGHT X 4; LEFT X 3  11/10/2015: TRANSRECTAL ULTRASOUND GUIDED PROSTATE BIOPSY; Bilateral      Comment:  Performed by [facility]ring MD at [facility] OR  5/31/2017: ULTRASOUND-ENDOSCOPIC-UPPER; N/A      Comment:  Performed by Tra Brooks MD at [facility] ENDO (2ND FLR)  1/4/2016: ULTRASOUND-ENDOSCOPIC-UPPER; N/A      Comment:  Performed by Tra Brooks MD at [facility] ENDO (2ND FLR)  1/16/2015: ULTRASOUND-ENDOSCOPIC-UPPER; N/A      Comment:  Performed by Jason Saleem MD at [facility] ENDO (2ND FLR)    Patient likes blankets on.    Outcome: Ongoing (interventions implemented as appropriate)  POC reviewed with pt and daughter at 0500. Pt and daughter verbalized understanding. Questions and concerns addressed. Refer to previous notes regarding tonight's shift. See neurological assessment flowsheet for fluctuations in neuro exam. Sheard NP aware. HD to be completed today. APTT resulted at 57.9, heparin drips remain therapeutic and currently infusing at 14 units/kg/hr.  Will continue to monitor. See flowsheets for full assessment and VS info

## 2019-03-27 NOTE — SUBJECTIVE & OBJECTIVE
Interval History:  >4 elements OR status of 3 inpatient conditions    Review of Systems   Constitutional: Positive for fatigue.   HENT: Negative.    Eyes: Negative.    Respiratory: Negative.    Cardiovascular: Negative.    Endocrine: Negative.    Genitourinary: Negative.    Musculoskeletal: Positive for back pain.   Neurological: Positive for weakness and numbness.   Psychiatric/Behavioral: Negative.      2 systems OR Unable to obtain a complete ROS due to level of consciousness.  Objective:     Vitals:  Temp: 98.1 °F (36.7 °C)  Pulse: 68  Rhythm: atrial rhythm  BP: (!) 153/65  MAP (mmHg): 93  Resp: (!) 21  SpO2: 98 %  O2 Device (Oxygen Therapy): nasal cannula    Temp  Min: 98.1 °F (36.7 °C)  Max: 98.4 °F (36.9 °C)  Pulse  Min: 67  Max: 86  BP  Min: 139/70  Max: 202/82  MAP (mmHg)  Min: 88  Max: 146  Resp  Min: 14  Max: 31  SpO2  Min: 90 %  Max: 100 %    03/26 0701 - 03/27 0700  In: 424.8 [I.V.:424.8]  Out: 750 [Urine:750]   Unmeasured Output  Urine Occurrence: 1  Stool Occurrence: 0       Physical Exam    Constitutional: Well-nourished and -developed. No apparent distress.   Eyes: Conjunctiva clear, anicteric. Lids no lesions.  Head/Ears/Nose/Mouth/Throat/Neck: Moist mucous membranes. External ears, nose atraumatic.   Cardiovascular: IrRegular rhythm. ESM  Respiratory: Comfortable respirations. Mild wheezing  Gastrointestinal:  Distended but soft and lax. +ve BS      Neurologic Examination:    -Mental Status: Alert. Oriented to person, place, and time. Speech fluent. Follows commands.   -Cranial nerves: Pupils equal, round, and reactive bilateral. Extraocular movements intact.   -Motor: RUE 4-/5, RLE 4-/5. LUE 5/5, LLE 5/5  -Sensation: Decreased sensation to the right nikita body       Medications:  Continuous    heparin (porcine) in D5W Last Rate: 14 Units/kg/hr (03/27/19 1405)   Scheduled    sodium chloride 0.9%  Once   allopurinol 100 mg Daily   aspirin 81 mg Daily   atorvastatin 80 mg Daily   carvedilol 50 mg BID  WM   finasteride 5 mg Daily   gabapentin 300 mg QHS   [START ON 3/28/2019] insulin detemir U-100 8 Units Daily   losartan 100 mg QHS   pantoprazole 40 mg Daily   polyethylene glycol 17 g Daily   senna-docusate 8.6-50 mg 1 tablet BID   tamsulosin 1 capsule Daily   torsemide 20 mg BID   warfarin 5 mg Daily   PRN    sodium chloride 0.9%  PRN   acetaminophen 650 mg Q6H PRN   albuterol-ipratropium 3 mL Q4H PRN   dextrose 50% 12.5 g PRN   dextrose 50% 25 g PRN   glucagon (human recombinant) 1 mg PRN   glucose 16 g PRN   glucose 24 g PRN   heparin (PORCINE) 30 Units/kg (Adjusted) PRN   heparin (PORCINE) 44.2 Units/kg (Adjusted) PRN   insulin aspart U-100 1-10 Units QID (AC + HS) PRN   nitroGLYCERIN 0.4 mg Q5 Min PRN   ondansetron 8 mg Q6H PRN     Today I personally reviewed pertinent medications, lines/drains/airways, imaging, cardiology results, laboratory results, microbiology results, notably:    Diet  Diet diabetic [facility]; 2000 Calorie; Cardiac (Low Na/Chol), Renal, Coumadin Restriction  Diet diabetic [facility]; 2000 Calorie; Cardiac (Low Na/Chol), Renal, Coumadin Restriction

## 2019-03-27 NOTE — PROGRESS NOTES
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 Patient was admitted to Hospital Medicine 3/22 for evaluation of unstable angina. On 3/24 @ 1000, the patient's daughter came to visit and she noticed him lethargic, disoriented which soon progressed to include facial droop and dysarthria. A sroke code was called in response to new onset R weakness and dysarthria. MRI was completed noting Acute L PCA infarct. Per Vascular Neuro staff, the symptoms improved while supine during imaging. The patient was subsequently transferred to [facility] for further management. The patient does have a history of coumadin and ASA use. During admission he was placed on a heparin drip for Afib but it was quickly discontinued after the patient began to experience feeling of euphoria which were resolved with discontinuation of heparin gtt.              Hospital Course: 3/25: NAEO.   3/26 Had few beats of Vtach this am   3/27: NAEO  3/28: NAEO    Interval History:  >4 elements OR status of 3 inpatient conditions    Review of Systems   Constitutional: Positive for fatigue.   HENT: Negative.    Eyes: Negative.    Respiratory: Negative.    Cardiovascular: Negative.    Endocrine: Negative.    Genitourinary: Negative.    Musculoskeletal: Positive for back pain.   Neurological: Positive for weakness and numbness.   Psychiatric/Behavioral: Negative.      2 systems OR Unable to obtain a complete ROS due to level of consciousness.  Objective:     Vitals:  Temp: 98.1 °F (36.7 °C)  Pulse: 68  Rhythm: atrial rhythm  BP: (!) 153/65  MAP  (mmHg): 93  Resp: (!) 21  SpO2: 98 %  O2 Device (Oxygen Therapy): nasal cannula    Temp  Min: 98.1 °F (36.7 °C)  Max: 98.4 °F (36.9 °C)  Pulse  Min: 67  Max: 86  BP  Min: 139/70  Max: 202/82  MAP (mmHg)  Min: 88  Max: 146  Resp  Min: 14  Max: 31  SpO2  Min: 90 %  Max: 100 %    03/26 0701 - 03/27 0700  In: 424.8 [I.V.:424.8]  Out: 750 [Urine:750]   Unmeasured Output  Urine Occurrence: 1  Stool Occurrence: 0       Physical Exam    Constitutional: Well-nourished and -developed. No apparent distress.   Eyes: Conjunctiva clear, anicteric. Lids no lesions.  Head/Ears/Nose/Mouth/Throat/Neck: Moist mucous membranes. External ears, nose atraumatic.   Cardiovascular: IrRegular rhythm. ESM  Respiratory: Comfortable respirations. Mild wheezing  Gastrointestinal:  Distended but soft and lax. +ve BS      Neurologic Examination:    -Mental Status: Alert. Oriented to person, place, and time. Speech fluent. Follows commands.   -Cranial nerves: Pupils equal, round, and reactive bilateral. Extraocular movements intact.   -Motor: RUE 4-/5, RLE 4-/5. LUE 5/5, LLE 5/5  -Sensation: Decreased sensation to the right nikita body       Medications:  Continuous    heparin (porcine) in D5W Last Rate: 14 Units/kg/hr (03/27/19 1405)   Scheduled    sodium chloride 0.9%  Once   allopurinol 100 mg Daily   aspirin 81 mg Daily   atorvastatin 80 mg Daily   carvedilol 50 mg BID WM   finasteride 5 mg Daily   gabapentin 300 mg QHS   [START ON 3/28/2019] insulin detemir U-100 8 Units Daily   losartan 100 mg QHS   pantoprazole 40 mg Daily   polyethylene glycol 17 g Daily   senna-docusate 8.6-50 mg 1 tablet BID   tamsulosin 1 capsule Daily   torsemide 20 mg BID   warfarin 5 mg Daily   PRN    sodium chloride 0.9%  PRN   acetaminophen 650 mg Q6H PRN   albuterol-ipratropium 3 mL Q4H PRN   dextrose 50% 12.5 g PRN   dextrose 50% 25 g PRN   glucagon (human recombinant) 1 mg PRN   glucose 16 g PRN   glucose 24 g PRN   heparin (PORCINE) 30 Units/kg (Adjusted) PRN    heparin (PORCINE) 44.2 Units/kg (Adjusted) PRN   insulin aspart U-100 1-10 Units QID (AC + HS) PRN   nitroGLYCERIN 0.4 mg Q5 Min PRN   ondansetron 8 mg Q6H PRN     Today I personally reviewed pertinent medications, lines/drains/airways, imaging, cardiology results, laboratory results, microbiology results, notably:    Diet  Diet diabetic [facility]; 2000 Calorie; Cardiac (Low Na/Chol), Renal, Coumadin Restriction  Diet diabetic [facility]; 2000 Calorie; Cardiac (Low Na/Chol), Renal, Coumadin Restriction        Assessment/Plan:     Neuro  * 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  Continue current secondary prevention measures  MRI brain showed evolution of left PCA stroke       Cardiac/Vascular  Paroxysmal atrial fibrillation  Rate controlled  Continue heparin gtt  Coumadin         NSTEMI (non-ST elevated myocardial infarction)  Continue medical management  Appreciate cards recs    Hypertension due to end stage renal disease caused by type 2 diabetes mellitus, on dialysis  HD plans per nephrology     CAD (coronary artery disease), 2V CABG 2007  Hx CAD/ CABG 2007  Continue ASA and heparin gtt  Continue statin  Appreciate card recs      Hyperlipidemia, baseline   Monitor Lipid Panel  Statins     Essential hypertension  Permissive HTN      Carotid artery stenosis  - No surgical intervention per vascular surgery recs. Medical management for now      Hematology  Long term (current) use of anticoagulants  Continue heparin gtt  Start coumadin       Endocrine  Type 2 diabetes mellitus, without long-term current use of insulin  Continue current regimen           The patient is being Prophylaxed for:  Venous Thromboembolism with: Mechanical or Chemical  Stress Ulcer with: H2B  Ventilator Pneumonia with: not applicable    Activity Orders          Diet diabetic [facility]; 2000 Calorie; Cardiac (Low Na/Chol), Renal, Coumadin Restriction: Diabetic starting at 03/27 0953        Full  Code    Uninterrupted Critical Care/Counseling Time (not including procedures): 35 minutes     Augusto Stevens MD  Neurocritical Care  [facility]

## 2019-03-27 NOTE — PROGRESS NOTES
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   Interval MRI with expected extension of infarct within the left PCA territory involving thalamocapsular region as well as medial temporal lobe correlating to his worsening symptoms of thalamic aphasia and visual defect and worsening strength in R UE now 2/5 in RUE     Antithrombotics for secondary stroke prevention: Antiplatelets: Aspirin: 81 mg daily    Statins for secondary stroke prevention and hyperlipidemia, if present:   Statins: Atorvastatin- 80 mg daily    Aggressive risk factor modification: HTN, DM, HLD, A-Fib, CAD     Rehab efforts: PT/OT/SLP to evaluate and treat, PM&R consult     Diagnostics ordered/pending: none    VTE prophylaxis: Heparin gtt     BP parameters: Infarct: No intervention, SBP <220        Paroxysmal atrial fibrillation  chadsvasc 6  stroke risk factor   on coreg for rate control     ESRD (end stage renal disease) on dialysis  Continue scheduled HD   Patient is being followed by nephrology, appreciate their assistance  Patient still makes urine - continue diuresis with torsemide - >1L UOP in last 24hours  Patient tolerated HD with 500ml removed as well as metabolic toxins     Type 2 diabetes mellitus, without long-term current use of insulin  Lab Results   Component Value Date    HGBA1C 8.3 (H) 03/21/2019     Stoke risk factor   managed by primary team   Continue basal insulin with sliding scale per primary service (consider increasing basal with relatively high sliding scale demands)    Long term (current) use of anticoagulants  - continue heparin gtt with asa 81mg     Benign prostatic hyperplasia without lower urinary tract symptoms  - continue  flomax and finasteride    Gout, arthritis  - continue allopurinol, patient would likely benefit to change dosing to post dialysis days only     CAD (coronary artery disease), 2V CABG 2007  Stoke risk factor   No ongoing signs/symptoms of ischemia  Continue single Aspirin 81mg and heparin gtt  Continue coreg 25 bid and losartan 100mg  Continue high intensity statin        Hyperlipidemia, baseline   Lab Results   Component Value Date    LDLCALC 46.4 (L) 07/26/2018   Stoke risk factor   Continue atorvastatin 80 mg       Essential hypertension  Ok with hypertension in the setting of flow dependent stroke symptoms   Continue carvedilol, losartan and torsemide      Carotid artery stenosis  - noted on both CTA and on ultrasound of carotids  - patient evaluated by vascular who agree with maximal medical management         Admitted to hospital medicine for unstable angina eval, vascular neurology consulted for right sided weakness, facial droop and dysarthria, MRI showed L PCA infarction, not candidate for TPA ( symptoms duration >4 hours)patient symptoms improved with laying flat, admitted to neuro critical care. Patient with known PCA infarct on L with undulating symptom pattern with modification to level of recumbency. Patient restarted on heparin gtt. Underwent US of LE and of carotid with evidence of <50% L ICA stenosis and 60-70% R ICA stenosis, heavily calcified on CTA, additionally L vertebral also heavily calcified. Patient tolerated HD. Neuro deficit appear to be more severe on 3/27 with decreasing right arm strength now 2/5 and with worsening dysarthria/aphasia.     STROKE DOCUMENTATION        NIH Scale: 8          Modified [place]    [place] Coma Scale:15  ABCD2 Score:    [id number] Score:6  HAS -BLED Score:   ICH Score:   Hunt & Sharp Classification:      Hemorrhagic change of an Ischemic Stroke: Does this patient have an ischemic stroke with hemorrhagic changes? No     Neurologic Chief Complaint: right  sided weakness    Subjective:     Interval History: Patient is seen for follow-up neurological assessment and treatment recommendations: see hospital course for interval history    HPI, Past Medical, Family, and Social History remains the same as documented in the initial encounter.     Review of Systems   Constitutional: Negative for chills, fatigue and fever.   HENT: Negative for postnasal drip and sore throat.    Eyes: Negative.    Respiratory: Positive for shortness of breath. Negative for chest tightness.    Cardiovascular: Negative for chest pain, palpitations and leg swelling.   Gastrointestinal: Negative for abdominal pain, blood in stool, constipation and nausea.   Endocrine: Negative.    Genitourinary: Negative for dysuria.   Musculoskeletal: Negative for arthralgias, back pain and joint swelling.   Skin: Negative.    Allergic/Immunologic: Negative.    Neurological: Positive for dizziness, speech difficulty and weakness. Negative for light-headedness and headaches.   Psychiatric/Behavioral: Positive for confusion. Negative for dysphoric mood. The patient is not nervous/anxious and is not hyperactive.      Scheduled Meds:   sodium chloride 0.9%   Intravenous Once    allopurinol  100 mg Oral Daily    aspirin  81 mg Oral Daily    atorvastatin  80 mg Oral Daily    carvedilol  50 mg Oral BID WM    finasteride  5 mg Oral Daily    gabapentin  300 mg Oral QHS    [START ON 3/28/2019] insulin detemir U-100  8 Units Subcutaneous Daily    losartan  100 mg Oral QHS    pantoprazole  40 mg Oral Daily    polyethylene glycol  17 g Oral Daily    senna-docusate 8.6-50 mg  1 tablet Oral BID    tamsulosin  1 capsule Oral Daily    torsemide  20 mg Oral BID    warfarin  5 mg Oral Daily     Continuous Infusions:   heparin (porcine) in D5W 14 Units/kg/hr (03/27/19 1105)     PRN Meds:sodium chloride 0.9%, acetaminophen, albuterol-ipratropium, dextrose 50%, dextrose 50%, glucagon (human recombinant), glucose,  glucose, heparin (PORCINE), heparin (PORCINE), insulin aspart U-100, nitroGLYCERIN, ondansetron    Objective:     Vital Signs (Most Recent):  Temp: 98.1 °F (36.7 °C) (03/27/19 1105)  Pulse: 72 (03/27/19 1105)  Resp: 16 (03/27/19 1105)  BP: (!) 169/70 (03/27/19 1005)  SpO2: 100 % (03/27/19 1105)  BP Location: Right leg    Vital Signs Range (Last 24H):  Temp:  [98 °F (36.7 °C)-98.4 °F (36.9 °C)]   Pulse:  [67-87]   Resp:  [14-31]   BP: (142-192)/()   SpO2:  [90 %-100 %]   Arterial Line BP: (136-188)/(63-91)   BP Location: Right leg    Physical Exam   Constitutional: He is oriented to person, place, and time. He appears well-developed and well-nourished. He is cooperative.  Non-toxic appearance. He does not have a sickly appearance. He does not appear ill. No distress.   HENT:   Head: Normocephalic and atraumatic.   Eyes: Pupils are equal, round, and reactive to light.   Cardiovascular: S1 normal, S2 normal and intact distal pulses. An irregularly irregular rhythm present.   Murmur heard.  Pulses:       Radial pulses are 2+ on the right side, and 2+ on the left side.        Dorsalis pedis pulses are 2+ on the right side, and 2+ on the left side.   Pulmonary/Chest: He has decreased breath sounds. He has rales.   Abdominal: Soft.   Musculoskeletal: He exhibits no edema.   Neurological: He is alert and oriented to person, place, and time. No cranial nerve deficit or sensory deficit.   2/5 strength on R UE  5/5 in all other extremities   Some evidence of hemispatial neglect   Dysarthria with errors in language and issues with R field of vision (R superior quadrantanopsia)  Worsening aphasia    Skin: Skin is warm and dry.   Nursing note and vitals reviewed.      Neurological Exam:   LOC: alert  Language: + aphasia, dysarthria challenges in seeing/expressing words/letters in R field of vision suggestive of R superior quadrantanopsia   Pupils (CN II, III): PERRL    Laboratory:  Recent Results (from the past 24 hour(s))    POCT glucose    Collection Time: 03/26/19 12:22 PM   Result Value Ref Range    POCT Glucose 188 (H) 70 - 110 mg/dL   POCT glucose    Collection Time: 03/26/19  4:52 PM   Result Value Ref Range    POCT Glucose 154 (H) 70 - 110 mg/dL   POCT glucose    Collection Time: 03/26/19  9:13 PM   Result Value Ref Range    POCT Glucose 129 (H) 70 - 110 mg/dL   Comprehensive metabolic panel    Collection Time: 03/27/19  2:31 AM   Result Value Ref Range    Sodium 140 136 - 145 mmol/L    Potassium 4.2 3.5 - 5.1 mmol/L    Chloride 104 95 - 110 mmol/L    CO2 23 23 - 29 mmol/L    Glucose 73 70 - 110 mg/dL    BUN, Bld 83 (H) 8 - 23 mg/dL    Creatinine 5.0 (H) 0.5 - 1.4 mg/dL    Calcium 7.0 (L) 8.7 - 10.5 mg/dL    Total Protein 4.7 (L) 6.0 - 8.4 g/dL    Albumin 2.6 (L) 3.5 - 5.2 g/dL    Total Bilirubin 1.0 0.1 - 1.0 mg/dL    Alkaline Phosphatase 79 55 - 135 U/L    AST 20 10 - 40 U/L    ALT 47 (H) 10 - 44 U/L    Anion Gap 13 8 - 16 mmol/L    eGFR if African American 11.8 (A) >60 mL/min/1.73 m^2    eGFR if non African American 10.2 (A) >60 mL/min/1.73 m^2   Magnesium    Collection Time: 03/27/19  2:31 AM   Result Value Ref Range    Magnesium 1.9 1.6 - 2.6 mg/dL   Phosphorus    Collection Time: 03/27/19  2:31 AM   Result Value Ref Range    Phosphorus 5.5 (H) 2.7 - 4.5 mg/dL   APTT    Collection Time: 03/27/19  2:31 AM   Result Value Ref Range    aPTT 57.9 (H) 21.0 - 32.0 sec   CBC auto differential    Collection Time: 03/27/19  4:43 AM   Result Value Ref Range    WBC 8.72 3.90 - 12.70 K/uL    RBC 3.26 (L) 4.60 - 6.20 M/uL    Hemoglobin 10.4 (L) 14.0 - 18.0 g/dL    Hematocrit 31.0 (L) 40.0 - 54.0 %    MCV 95 82 - 98 fL    MCH 31.9 (H) 27.0 - 31.0 pg    MCHC 33.5 32.0 - 36.0 g/dL    RDW 15.8 (H) 11.5 - 14.5 %    Platelets 101 (L) 150 - 350 K/uL    MPV 10.5 9.2 - 12.9 fL    Immature Granulocytes 1.1 (H) 0.0 - 0.5 %    Gran # (ANC) 6.4 1.8 - 7.7 K/uL    Immature Grans (Abs) 0.10 (H) 0.00 - 0.04 K/uL    Lymph # 1.4 1.0 - 4.8 K/uL    Mono #  0.7 0.3 - 1.0 K/uL    Eos # 0.1 0.0 - 0.5 K/uL    Baso # 0.01 0.00 - 0.20 K/uL    nRBC 0 0 /100 WBC    Gran% 73.0 38.0 - 73.0 %    Lymph% 16.5 (L) 18.0 - 48.0 %    Mono% 8.3 4.0 - 15.0 %    Eosinophil% 1.0 0.0 - 8.0 %    Basophil% 0.1 0.0 - 1.9 %    Differential Method Automated    POCT glucose    Collection Time: 03/27/19  7:35 AM   Result Value Ref Range    POCT Glucose 95 70 - 110 mg/dL   Protime-INR    Collection Time: 03/27/19 10:32 AM   Result Value Ref Range    Prothrombin Time 11.4 9.0 - 12.5 sec    INR 1.1 0.8 - 1.2         Diagnostic Results     Brain Imaging     MRI 3/26/19  New acute left PCA territory distribution infarctions involving the left paramedian occipital lobe, parahippocampal region and left splenium of the corpus callosum with additional acute evolving involving infarctions of the left thalamus.  No evidence of superimposed hemorrhage or hydrocephalus.  2. Generalized cerebral volume loss and findings suggestive of significant chronic microvascular ischemic change.      Under vessel imaging  Vessel Imaging   VAS US GIOVANY LEGS  Report Summary:  Impression:   Right Leg:  Color flow evaluation of the lower extremity demonstrates fully compressible and patent veins. There is no evidence of venous thrombosis in the deep or superficial veins.  Incidental finding of significant reflux noted in the CFV.    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 There is no evidence of venous thrombosis in the remaining deep veins.    VAS US Carotid Bilaterally  Report Summary:  Impression:   RIGHT SIDE:  Velocities are suggestive of a 40-59% right ICA stenosis; however, it  visually appears to be in the 60-79% range.   Calcification of the right internal carotid artery, which denies complete visualization.   Heterogeneous plaque in the right common carotid artery.   Antegrade flow in the right vertebral artery.    LEFT SIDE:  1 - 39% left ICA stenosis.   Calcification of the left internal carotid artery.   Heterogeneous plaque in the left common carotid artery.   Retrograde flow in the left vertebral artery.    MRI 3/24 Brain Ischemic protocol   Punctate posterior left thalamic acute infarction.  No mass effect or hemorrhage.    Focal decreased signal within the proximal left PCA, suggestive of high-grade stenosis.    Moderate to high-grade narrowing of the intra cavernous/ supraclinoid ICAs bilaterally.    High-grade stenosis distal left vertebral artery.    CTA STROKE MULTIPHASE 3/24  No acute intracranial hemorrhage or mass effect.  No ischemic changes appreciated on CT.    Extensive calcified atherosclerotic disease resulting:    Left PCA with a high-grade stenosis.    High-grade (>70%) stenosis right proximal ICA and moderate (50-70%) stenosis left proximal ICA.    Moderate to high-grade stenosis of the intra cavernous/ supraclinoid ICAs bilaterally.    High-grade stenosis distal left vertebral artery.    Cardiac Imaging   TRANSTHORACIC ECHO (TTE) COMPLETE 3/23/2019  · Eccentric left ventricular hypertrophy.Normal left ventricular systolic function. The estimated ejection fraction is 55%  · Severe left atrial enlargement.  · Indeterminate left ventricular diastolic function.  · Moderate aortic valve stenosis. Aortic valve area is 1.37 cm2; peak velocity is 2.78 m/s; mean gradient is 22.39 mmHg. Mild aortic regurgitation.  · Mild-to-moderate mitral regurgitation.  · Mild to moderate tricuspid regurgitation.  · The estimated PA systolic pressure is 41 mm Hg  · Severe right atrial enlargement.  · Normal right ventricular systolic function. The right ventricle is mildly dilated.  · Patent foramen ovale present with left to right shunting indicated by color flow Doppler.  · Normal central venous pressure (3 mm Hg).  · Pulmonary hypertension present.        Thiago Pickard MD  Comprehensive Stroke Center  Department of  Vascular Neurology   [facility]

## 2019-03-27 NOTE — SUBJECTIVE & OBJECTIVE
Neurologic Chief Complaint: right sided weakness    Subjective:     Interval History: Patient is seen for follow-up neurological assessment and treatment recommendations: see hospital course for interval history    HPI, Past Medical, Family, and Social History remains the same as documented in the initial encounter.     Review of Systems   Constitutional: Negative for chills, fatigue and fever.   HENT: Negative for postnasal drip and sore throat.    Eyes: Negative.    Respiratory: Positive for shortness of breath. Negative for chest tightness.    Cardiovascular: Negative for chest pain, palpitations and leg swelling.   Gastrointestinal: Negative for abdominal pain, blood in stool, constipation and nausea.   Endocrine: Negative.    Genitourinary: Negative for dysuria.   Musculoskeletal: Negative for arthralgias, back pain and joint swelling.   Skin: Negative.    Allergic/Immunologic: Negative.    Neurological: Positive for dizziness, speech difficulty and weakness. Negative for light-headedness and headaches.   Psychiatric/Behavioral: Positive for confusion. Negative for dysphoric mood. The patient is not nervous/anxious and is not hyperactive.      Scheduled Meds:   sodium chloride 0.9%   Intravenous Once    allopurinol  100 mg Oral Daily    aspirin  81 mg Oral Daily    atorvastatin  80 mg Oral Daily    carvedilol  50 mg Oral BID WM    finasteride  5 mg Oral Daily    gabapentin  300 mg Oral QHS    [START ON 3/28/2019] insulin detemir U-100  8 Units Subcutaneous Daily    losartan  100 mg Oral QHS    pantoprazole  40 mg Oral Daily    polyethylene glycol  17 g Oral Daily    senna-docusate 8.6-50 mg  1 tablet Oral BID    tamsulosin  1 capsule Oral Daily    torsemide  20 mg Oral BID    warfarin  5 mg Oral Daily     Continuous Infusions:   heparin (porcine) in D5W 14 Units/kg/hr (03/27/19 1105)     PRN Meds:sodium chloride 0.9%, acetaminophen, albuterol-ipratropium, dextrose 50%, dextrose 50%, glucagon  (human recombinant), glucose, glucose, heparin (PORCINE), heparin (PORCINE), insulin aspart U-100, nitroGLYCERIN, ondansetron    Objective:     Vital Signs (Most Recent):  Temp: 98.1 °F (36.7 °C) (03/27/19 1105)  Pulse: 72 (03/27/19 1105)  Resp: 16 (03/27/19 1105)  BP: (!) 169/70 (03/27/19 1005)  SpO2: 100 % (03/27/19 1105)  BP Location: Right leg    Vital Signs Range (Last 24H):  Temp:  [98 °F (36.7 °C)-98.4 °F (36.9 °C)]   Pulse:  [67-87]   Resp:  [14-31]   BP: (142-192)/()   SpO2:  [90 %-100 %]   Arterial Line BP: (136-188)/(63-91)   BP Location: Right leg    Physical Exam   Constitutional: He is oriented to person, place, and time. He appears well-developed and well-nourished. He is cooperative.  Non-toxic appearance. He does not have a sickly appearance. He does not appear ill. No distress.   HENT:   Head: Normocephalic and atraumatic.   Eyes: Pupils are equal, round, and reactive to light.   Cardiovascular: S1 normal, S2 normal and intact distal pulses. An irregularly irregular rhythm present.   Murmur heard.  Pulses:       Radial pulses are 2+ on the right side, and 2+ on the left side.        Dorsalis pedis pulses are 2+ on the right side, and 2+ on the left side.   Pulmonary/Chest: He has decreased breath sounds. He has rales.   Abdominal: Soft.   Musculoskeletal: He exhibits no edema.   Neurological: He is alert and oriented to person, place, and time. No cranial nerve deficit or sensory deficit.   2/5 strength on R UE  5/5 in all other extremities   Some evidence of hemispatial neglect   Dysarthria with errors in language and issues with R field of vision (R superior quadrantanopsia)  Worsening aphasia    Skin: Skin is warm and dry.   Nursing note and vitals reviewed.      Neurological Exam:   LOC: alert  Language: + aphasia, dysarthria challenges in seeing/expressing words/letters in R field of vision suggestive of R superior quadrantanopsia   Pupils (CN II, III): PERRL    Laboratory:  Recent  Results (from the past 24 hour(s))   POCT glucose    Collection Time: 03/26/19 12:22 PM   Result Value Ref Range    POCT Glucose 188 (H) 70 - 110 mg/dL   POCT glucose    Collection Time: 03/26/19  4:52 PM   Result Value Ref Range    POCT Glucose 154 (H) 70 - 110 mg/dL   POCT glucose    Collection Time: 03/26/19  9:13 PM   Result Value Ref Range    POCT Glucose 129 (H) 70 - 110 mg/dL   Comprehensive metabolic panel    Collection Time: 03/27/19  2:31 AM   Result Value Ref Range    Sodium 140 136 - 145 mmol/L    Potassium 4.2 3.5 - 5.1 mmol/L    Chloride 104 95 - 110 mmol/L    CO2 23 23 - 29 mmol/L    Glucose 73 70 - 110 mg/dL    BUN, Bld 83 (H) 8 - 23 mg/dL    Creatinine 5.0 (H) 0.5 - 1.4 mg/dL    Calcium 7.0 (L) 8.7 - 10.5 mg/dL    Total Protein 4.7 (L) 6.0 - 8.4 g/dL    Albumin 2.6 (L) 3.5 - 5.2 g/dL    Total Bilirubin 1.0 0.1 - 1.0 mg/dL    Alkaline Phosphatase 79 55 - 135 U/L    AST 20 10 - 40 U/L    ALT 47 (H) 10 - 44 U/L    Anion Gap 13 8 - 16 mmol/L    eGFR if African American 11.8 (A) >60 mL/min/1.73 m^2    eGFR if non African American 10.2 (A) >60 mL/min/1.73 m^2   Magnesium    Collection Time: 03/27/19  2:31 AM   Result Value Ref Range    Magnesium 1.9 1.6 - 2.6 mg/dL   Phosphorus    Collection Time: 03/27/19  2:31 AM   Result Value Ref Range    Phosphorus 5.5 (H) 2.7 - 4.5 mg/dL   APTT    Collection Time: 03/27/19  2:31 AM   Result Value Ref Range    aPTT 57.9 (H) 21.0 - 32.0 sec   CBC auto differential    Collection Time: 03/27/19  4:43 AM   Result Value Ref Range    WBC 8.72 3.90 - 12.70 K/uL    RBC 3.26 (L) 4.60 - 6.20 M/uL    Hemoglobin 10.4 (L) 14.0 - 18.0 g/dL    Hematocrit 31.0 (L) 40.0 - 54.0 %    MCV 95 82 - 98 fL    MCH 31.9 (H) 27.0 - 31.0 pg    MCHC 33.5 32.0 - 36.0 g/dL    RDW 15.8 (H) 11.5 - 14.5 %    Platelets 101 (L) 150 - 350 K/uL    MPV 10.5 9.2 - 12.9 fL    Immature Granulocytes 1.1 (H) 0.0 - 0.5 %    Gran # (ANC) 6.4 1.8 - 7.7 K/uL    Immature Grans (Abs) 0.10 (H) 0.00 - 0.04 K/uL     Lymph # 1.4 1.0 - 4.8 K/uL    Mono # 0.7 0.3 - 1.0 K/uL    Eos # 0.1 0.0 - 0.5 K/uL    Baso # 0.01 0.00 - 0.20 K/uL    nRBC 0 0 /100 WBC    Gran% 73.0 38.0 - 73.0 %    Lymph% 16.5 (L) 18.0 - 48.0 %    Mono% 8.3 4.0 - 15.0 %    Eosinophil% 1.0 0.0 - 8.0 %    Basophil% 0.1 0.0 - 1.9 %    Differential Method Automated    POCT glucose    Collection Time: 03/27/19  7:35 AM   Result Value Ref Range    POCT Glucose 95 70 - 110 mg/dL   Protime-INR    Collection Time: 03/27/19 10:32 AM   Result Value Ref Range    Prothrombin Time 11.4 9.0 - 12.5 sec    INR 1.1 0.8 - 1.2         Diagnostic Results     Brain Imaging     MRI 3/26/19  New acute left PCA territory distribution infarctions involving the left paramedian occipital lobe, parahippocampal region and left splenium of the corpus callosum with additional acute evolving involving infarctions of the left thalamus.  No evidence of superimposed hemorrhage or hydrocephalus.  2. Generalized cerebral volume loss and findings suggestive of significant chronic microvascular ischemic change.      Under vessel imaging  Vessel Imaging   VAS US GIOVANY LEGS  Report Summary:  Impression:   Right Leg:  Color flow evaluation of the lower extremity demonstrates fully compressible and patent veins. There is no evidence of venous thrombosis in the deep or superficial veins.  Incidental finding of significant reflux noted in the CFV.    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 There is no evidence of venous thrombosis in the remaining deep veins.    VAS US Carotid Bilaterally  Report Summary:  Impression:   RIGHT SIDE:  Velocities are suggestive of a 40-59% right ICA stenosis; however, it  visually appears to be in the 60-79% range.   Calcification of the right internal carotid artery, which denies complete visualization.   Heterogeneous plaque in the right common carotid artery.   Antegrade  flow in the right vertebral artery.   LEFT SIDE:  1 - 39% left ICA stenosis.   Calcification of the left internal carotid artery.   Heterogeneous plaque in the left common carotid artery.   Retrograde flow in the left vertebral artery.    MRI 3/24 Brain Ischemic protocol   Punctate posterior left thalamic acute infarction.  No mass effect or hemorrhage.    Focal decreased signal within the proximal left PCA, suggestive of high-grade stenosis.    Moderate to high-grade narrowing of the intra cavernous/ supraclinoid ICAs bilaterally.    High-grade stenosis distal left vertebral artery.    CTA STROKE MULTIPHASE 3/24  No acute intracranial hemorrhage or mass effect.  No ischemic changes appreciated on CT.    Extensive calcified atherosclerotic disease resulting:    Left PCA with a high-grade stenosis.    High-grade (>70%) stenosis right proximal ICA and moderate (50-70%) stenosis left proximal ICA.    Moderate to high-grade stenosis of the intra cavernous/ supraclinoid ICAs bilaterally.    High-grade stenosis distal left vertebral artery.    Cardiac Imaging   TRANSTHORACIC ECHO (TTE) COMPLETE 3/23/2019  · Eccentric left ventricular hypertrophy.Normal left ventricular systolic function. The estimated ejection fraction is 55%  · Severe left atrial enlargement.  · Indeterminate left ventricular diastolic function.  · Moderate aortic valve stenosis. Aortic valve area is 1.37 cm2; peak velocity is 2.78 m/s; mean gradient is 22.39 mmHg. Mild aortic regurgitation.  · Mild-to-moderate mitral regurgitation.  · Mild to moderate tricuspid regurgitation.  · The estimated PA systolic pressure is 41 mm Hg  · Severe right atrial enlargement.  · Normal right ventricular systolic function. The right ventricle is mildly dilated.  · Patent foramen ovale present with left to right shunting indicated by color flow Doppler.  · Normal central venous pressure (3 mm Hg).  · Pulmonary hypertension present.

## 2019-03-28 ENCOUNTER — CLINICAL SUPPORT (OUTPATIENT)
Dept: CARDIOLOGY | Facility: CLINIC | Age: 79
DRG: 280 | End: 2019-03-28
Attending: HOSPITALIST
Payer: MEDICARE

## 2019-03-28 PROBLEM — I63.9 DYSARTHRIA DUE TO ACUTE CEREBROVASCULAR ACCIDENT (CVA): Status: ACTIVE | Noted: 2019-03-28

## 2019-03-28 PROBLEM — M79.89 FOREARM SWELLING: Status: ACTIVE | Noted: 2019-03-28

## 2019-03-28 PROBLEM — R47.1 DYSARTHRIA DUE TO ACUTE CEREBROVASCULAR ACCIDENT (CVA): Status: ACTIVE | Noted: 2019-03-28

## 2019-03-28 PROBLEM — G93.40 ENCEPHALOPATHY: Status: ACTIVE | Noted: 2019-03-28

## 2019-03-28 LAB
ALBUMIN SERPL BCP-MCNC: 2.6 G/DL (ref 3.5–5.2)
ALP SERPL-CCNC: 79 U/L (ref 55–135)
ALT SERPL W/O P-5'-P-CCNC: 37 U/L (ref 10–44)
ANION GAP SERPL CALC-SCNC: 11 MMOL/L (ref 8–16)
ANION GAP SERPL CALC-SCNC: 8 MMOL/L (ref 8–16)
APTT BLDCRRT: 52.2 SEC (ref 21–32)
APTT BLDCRRT: 59.7 SEC (ref 21–32)
AST SERPL-CCNC: 15 U/L (ref 10–40)
BASOPHILS # BLD AUTO: 0.01 K/UL (ref 0–0.2)
BASOPHILS # BLD AUTO: 0.01 K/UL (ref 0–0.2)
BASOPHILS NFR BLD: 0.1 % (ref 0–1.9)
BASOPHILS NFR BLD: 0.1 % (ref 0–1.9)
BILIRUB SERPL-MCNC: 1 MG/DL (ref 0.1–1)
BUN SERPL-MCNC: 42 MG/DL (ref 8–23)
BUN SERPL-MCNC: 45 MG/DL (ref 8–23)
CALCIUM SERPL-MCNC: 7.5 MG/DL (ref 8.7–10.5)
CALCIUM SERPL-MCNC: 7.6 MG/DL (ref 8.7–10.5)
CHLORIDE SERPL-SCNC: 104 MMOL/L (ref 95–110)
CHLORIDE SERPL-SCNC: 105 MMOL/L (ref 95–110)
CO2 SERPL-SCNC: 25 MMOL/L (ref 23–29)
CO2 SERPL-SCNC: 27 MMOL/L (ref 23–29)
CREAT SERPL-MCNC: 3.2 MG/DL (ref 0.5–1.4)
CREAT SERPL-MCNC: 3.7 MG/DL (ref 0.5–1.4)
DIFFERENTIAL METHOD: ABNORMAL
DIFFERENTIAL METHOD: ABNORMAL
EOSINOPHIL # BLD AUTO: 0.1 K/UL (ref 0–0.5)
EOSINOPHIL # BLD AUTO: 0.1 K/UL (ref 0–0.5)
EOSINOPHIL NFR BLD: 0.5 % (ref 0–8)
EOSINOPHIL NFR BLD: 1.1 % (ref 0–8)
ERYTHROCYTE [DISTWIDTH] IN BLOOD BY AUTOMATED COUNT: 15.3 % (ref 11.5–14.5)
ERYTHROCYTE [DISTWIDTH] IN BLOOD BY AUTOMATED COUNT: 15.4 % (ref 11.5–14.5)
EST. GFR  (AFRICAN AMERICAN): 16.9 ML/MIN/1.73 M^2
EST. GFR  (AFRICAN AMERICAN): 20.2 ML/MIN/1.73 M^2
EST. GFR  (NON AFRICAN AMERICAN): 14.7 ML/MIN/1.73 M^2
EST. GFR  (NON AFRICAN AMERICAN): 17.5 ML/MIN/1.73 M^2
GLUCOSE SERPL-MCNC: 79 MG/DL (ref 70–110)
GLUCOSE SERPL-MCNC: 83 MG/DL (ref 70–110)
HCT VFR BLD AUTO: 29.5 % (ref 40–54)
HCT VFR BLD AUTO: 29.9 % (ref 40–54)
HGB BLD-MCNC: 10.3 G/DL (ref 14–18)
HGB BLD-MCNC: 9.6 G/DL (ref 14–18)
IMM GRANULOCYTES # BLD AUTO: 0.08 K/UL (ref 0–0.04)
IMM GRANULOCYTES # BLD AUTO: 0.09 K/UL (ref 0–0.04)
IMM GRANULOCYTES NFR BLD AUTO: 0.9 % (ref 0–0.5)
IMM GRANULOCYTES NFR BLD AUTO: 1.1 % (ref 0–0.5)
INR PPP: 1.1 (ref 0.8–1.2)
INR PPP: 1.1 (ref 0.8–1.2)
LYMPHOCYTES # BLD AUTO: 1.4 K/UL (ref 1–4.8)
LYMPHOCYTES # BLD AUTO: 1.5 K/UL (ref 1–4.8)
LYMPHOCYTES NFR BLD: 15.2 % (ref 18–48)
LYMPHOCYTES NFR BLD: 18.8 % (ref 18–48)
MAGNESIUM SERPL-MCNC: 1.5 MG/DL (ref 1.6–2.6)
MAGNESIUM SERPL-MCNC: 1.7 MG/DL (ref 1.6–2.6)
MCH RBC QN AUTO: 32 PG (ref 27–31)
MCH RBC QN AUTO: 32.2 PG (ref 27–31)
MCHC RBC AUTO-ENTMCNC: 32.5 G/DL (ref 32–36)
MCHC RBC AUTO-ENTMCNC: 34.4 G/DL (ref 32–36)
MCV RBC AUTO: 93 FL (ref 82–98)
MCV RBC AUTO: 99 FL (ref 82–98)
MONOCYTES # BLD AUTO: 0.5 K/UL (ref 0.3–1)
MONOCYTES # BLD AUTO: 0.7 K/UL (ref 0.3–1)
MONOCYTES NFR BLD: 6.5 % (ref 4–15)
MONOCYTES NFR BLD: 7.2 % (ref 4–15)
NEUTROPHILS # BLD AUTO: 5.3 K/UL (ref 1.8–7.7)
NEUTROPHILS # BLD AUTO: 7.5 K/UL (ref 1.8–7.7)
NEUTROPHILS NFR BLD: 72.4 % (ref 38–73)
NEUTROPHILS NFR BLD: 76.1 % (ref 38–73)
NRBC BLD-RTO: 0 /100 WBC
NRBC BLD-RTO: 0 /100 WBC
PHOSPHATE SERPL-MCNC: 3.6 MG/DL (ref 2.7–4.5)
PLATELET # BLD AUTO: 101 K/UL (ref 150–350)
PLATELET # BLD AUTO: 86 K/UL (ref 150–350)
PMV BLD AUTO: 10.3 FL (ref 9.2–12.9)
PMV BLD AUTO: 10.3 FL (ref 9.2–12.9)
POCT GLUCOSE: 113 MG/DL (ref 70–110)
POCT GLUCOSE: 137 MG/DL (ref 70–110)
POCT GLUCOSE: 216 MG/DL (ref 70–110)
POTASSIUM SERPL-SCNC: 3.7 MMOL/L (ref 3.5–5.1)
POTASSIUM SERPL-SCNC: 3.8 MMOL/L (ref 3.5–5.1)
PROT SERPL-MCNC: 4.8 G/DL (ref 6–8.4)
PROTHROMBIN TIME: 11.2 SEC (ref 9–12.5)
PROTHROMBIN TIME: 11.2 SEC (ref 9–12.5)
RBC # BLD AUTO: 2.98 M/UL (ref 4.6–6.2)
RBC # BLD AUTO: 3.22 M/UL (ref 4.6–6.2)
SODIUM SERPL-SCNC: 140 MMOL/L (ref 136–145)
SODIUM SERPL-SCNC: 140 MMOL/L (ref 136–145)
WBC # BLD AUTO: 7.25 K/UL (ref 3.9–12.7)
WBC # BLD AUTO: 9.86 K/UL (ref 3.9–12.7)

## 2019-03-28 PROCEDURE — 99233 SBSQ HOSP IP/OBS HIGH 50: CPT | Mod: GC,,, | Performed by: PSYCHIATRY & NEUROLOGY

## 2019-03-28 PROCEDURE — 99233 PR SUBSEQUENT HOSPITAL CARE,LEVL III: ICD-10-PCS | Mod: GC,,, | Performed by: PSYCHIATRY & NEUROLOGY

## 2019-03-28 PROCEDURE — 85730 THROMBOPLASTIN TIME PARTIAL: CPT | Mod: 91

## 2019-03-28 PROCEDURE — 85730 THROMBOPLASTIN TIME PARTIAL: CPT

## 2019-03-28 PROCEDURE — 92507 TX SP LANG VOICE COMM INDIV: CPT

## 2019-03-28 PROCEDURE — 25000003 PHARM REV CODE 250: Performed by: NURSE PRACTITIONER

## 2019-03-28 PROCEDURE — 83735 ASSAY OF MAGNESIUM: CPT

## 2019-03-28 PROCEDURE — 95816 EEG AWAKE AND DROWSY: CPT | Mod: 26,,, | Performed by: PSYCHIATRY & NEUROLOGY

## 2019-03-28 PROCEDURE — 80053 COMPREHEN METABOLIC PANEL: CPT

## 2019-03-28 PROCEDURE — 95816 EEG AWAKE AND DROWSY: CPT

## 2019-03-28 PROCEDURE — 93971 EXTREMITY STUDY: CPT | Mod: 26,RT,, | Performed by: INTERNAL MEDICINE

## 2019-03-28 PROCEDURE — 85610 PROTHROMBIN TIME: CPT

## 2019-03-28 PROCEDURE — 92526 ORAL FUNCTION THERAPY: CPT

## 2019-03-28 PROCEDURE — 93971 CV US DOPPLER VENOUS ARM RIGHT (CUPID ONLY): ICD-10-PCS | Mod: 26,RT,, | Performed by: INTERNAL MEDICINE

## 2019-03-28 PROCEDURE — 27000221 HC OXYGEN, UP TO 24 HOURS

## 2019-03-28 PROCEDURE — 25000003 PHARM REV CODE 250: Performed by: INTERNAL MEDICINE

## 2019-03-28 PROCEDURE — 85025 COMPLETE CBC W/AUTO DIFF WBC: CPT | Mod: 91

## 2019-03-28 PROCEDURE — 93971 EXTREMITY STUDY: CPT | Mod: RT

## 2019-03-28 PROCEDURE — 99232 PR SUBSEQUENT HOSPITAL CARE,LEVL II: ICD-10-PCS | Mod: GC,,, | Performed by: INTERNAL MEDICINE

## 2019-03-28 PROCEDURE — 20000000 HC ICU ROOM

## 2019-03-28 PROCEDURE — 25000003 PHARM REV CODE 250: Performed by: STUDENT IN AN ORGANIZED HEALTH CARE EDUCATION/TRAINING PROGRAM

## 2019-03-28 PROCEDURE — 95816 PR EEG,W/AWAKE & DROWSY RECORD: ICD-10-PCS | Mod: 26,,, | Performed by: PSYCHIATRY & NEUROLOGY

## 2019-03-28 PROCEDURE — 83735 ASSAY OF MAGNESIUM: CPT | Mod: 91

## 2019-03-28 PROCEDURE — 85610 PROTHROMBIN TIME: CPT | Mod: 91

## 2019-03-28 PROCEDURE — 25000003 PHARM REV CODE 250: Performed by: HOSPITALIST

## 2019-03-28 PROCEDURE — 63600175 PHARM REV CODE 636 W HCPCS: Performed by: UROLOGY

## 2019-03-28 PROCEDURE — 84100 ASSAY OF PHOSPHORUS: CPT

## 2019-03-28 PROCEDURE — 99232 SBSQ HOSP IP/OBS MODERATE 35: CPT | Mod: GC,,, | Performed by: INTERNAL MEDICINE

## 2019-03-28 PROCEDURE — 80048 BASIC METABOLIC PNL TOTAL CA: CPT

## 2019-03-28 PROCEDURE — 94761 N-INVAS EAR/PLS OXIMETRY MLT: CPT

## 2019-03-28 PROCEDURE — 63600175 PHARM REV CODE 636 W HCPCS: Performed by: NURSE PRACTITIONER

## 2019-03-28 RX ADMIN — MAGNESIUM SULFATE HEPTAHYDRATE 1 G: 500 INJECTION, SOLUTION INTRAMUSCULAR; INTRAVENOUS at 05:03

## 2019-03-28 RX ADMIN — CARVEDILOL 50 MG: 25 TABLET, FILM COATED ORAL at 09:03

## 2019-03-28 RX ADMIN — STANDARDIZED SENNA CONCENTRATE AND DOCUSATE SODIUM 1 TABLET: 8.6; 5 TABLET, FILM COATED ORAL at 09:03

## 2019-03-28 RX ADMIN — CARVEDILOL 50 MG: 25 TABLET, FILM COATED ORAL at 05:03

## 2019-03-28 RX ADMIN — LOSARTAN POTASSIUM 100 MG: 50 TABLET, FILM COATED ORAL at 09:03

## 2019-03-28 RX ADMIN — ACETAMINOPHEN 650 MG: 325 TABLET ORAL at 09:03

## 2019-03-28 RX ADMIN — WARFARIN SODIUM 5 MG: 5 TABLET ORAL at 05:03

## 2019-03-28 RX ADMIN — INSULIN ASPART 4 UNITS: 100 INJECTION, SOLUTION INTRAVENOUS; SUBCUTANEOUS at 11:03

## 2019-03-28 RX ADMIN — HEPARIN SODIUM AND DEXTROSE 14 UNITS/KG/HR: 10000; 5 INJECTION INTRAVENOUS at 11:03

## 2019-03-28 RX ADMIN — TORSEMIDE 20 MG: 20 TABLET ORAL at 09:03

## 2019-03-28 RX ADMIN — INSULIN DETEMIR 8 UNITS: 100 INJECTION, SOLUTION SUBCUTANEOUS at 09:03

## 2019-03-28 RX ADMIN — PANTOPRAZOLE SODIUM 40 MG: 40 TABLET, DELAYED RELEASE ORAL at 09:03

## 2019-03-28 RX ADMIN — FINASTERIDE 5 MG: 5 TABLET, FILM COATED ORAL at 09:03

## 2019-03-28 RX ADMIN — ATORVASTATIN CALCIUM 80 MG: 20 TABLET, FILM COATED ORAL at 09:03

## 2019-03-28 RX ADMIN — ALLOPURINOL 100 MG: 100 TABLET ORAL at 09:03

## 2019-03-28 RX ADMIN — TAMSULOSIN HYDROCHLORIDE 0.4 MG: 0.4 CAPSULE ORAL at 09:03

## 2019-03-28 RX ADMIN — ASPIRIN 81 MG: 81 TABLET, COATED ORAL at 09:03

## 2019-03-28 RX ADMIN — GABAPENTIN 300 MG: 300 CAPSULE ORAL at 09:03

## 2019-03-28 NOTE — PT/OT/SLP PROGRESS
Occupational Therapy  HOLD      Patient Name:  Micheal Hunt   MRN:  [id number]    1150: OT to HOLD for evaluation. Pt with neuro change. MD and RN at bedside. Going for STAT imagining.   Will follow up at later and more appropriate time.    DIANE Olmos  3/28/2019

## 2019-03-28 NOTE — PLAN OF CARE
Problem: Adult Inpatient Plan of Care  Goal: Plan of Care Review  Past Medical History:  7/29/2016: NICK (acute kidney injury)  No date: Allergy  12/15/2014: Anemia, mild  No date: Arthritis      Comment:  Gout  3/27/2012: Benign essential HTN  5/10/2018: BMI 29.0-29.9,adult  No date: BPH (benign prostatic hyperplasia)  No date: BPH (benign prostatic hyperplasia)  2006: CAD (coronary artery disease)  No date: Chronic kidney disease      Comment:  due to ibuprofen  No date: Colon polyp  No date: CRF (chronic renal failure), stage 5  No date: Diverticulosis  No date: Gastritis  No date: GERD (gastroesophageal reflux disease)  No date: Gout  5/3/2018: History of colon polyps  3/27/2012: HTN (hypertension)  No date: Hyperlipidemia  No date: Hyperlipidemia  6/14/2018: LLL pneumonia  No date: LVH (left ventricular hypertrophy)  No date: Mesenteric ischemia  3/27/2012: Murmur, cardiac  No date: GRICELDA (obstructive sleep apnea)      Comment:  DOES NOT USE A MACHINE  No date: Sinus problem  No date: Syncope and collapse      Past Surgical History:  No date: APPENDECTOMY  5/31/2016: BLOCK-NERVE; Left      Comment:  Performed by Kevin Leon MD at [facility] OR  No date: CARDIAC CATHETERIZATION  2011: COLONOSCOPY  5/3/2018: COLONOSCOPY; N/A      Comment:  Performed by Messi Harris MD at [facility] ENDO  9/10/2015: COLONOSCOPY; N/A      Comment:  Performed by Messi Harris MD at [facility] ENDO  4/2007: CORONARY ARTERY BYPASS GRAFT      Comment:  x 1  8/30/2017: CYSTOSCOPY; N/A      Comment:  Performed by Rudy Herring MD at [facility] OR  11/10/2015: CYSTOSCOPY; N/A      Comment:  Performed by Rudy Herring MD at [facility] OR  1/4/2016: ESOPHAGOGASTRODUODENOSCOPY (EGD); N/A      Comment:  Performed by Tra Brooks MD at [facility] ENDO (2ND FLR)  10/15/2014: ESOPHAGOGASTRODUODENOSCOPY (EGD); N/A      Comment:  Performed by Messi Harris MD at [facility] ENDO  2/25/2016: INJECTION-STEROID-EPIDURAL-TRANSFORAMINAL; Left      Comment:  Performed by  Kevin Leon MD at [facility] OR  No date: JOINT REPLACEMENT      Comment:  left knee total replacement  X 3  No date: mid leftt finger      Comment:  from a sony  2016: MOLE REMOVAL  4/11/2016: RADIOFREQUENCY THERMOCOAGULATION (RFTC)-NERVE-MEDIAN   BRANCH-LUMBAR; Left      Comment:  Performed by Kevin Leon MD at [facility] OR  No date: rotative cuff      Comment:  no rotative cuffs on bilat shoulders has pins   No date: SHOULDER SURGERY      Comment:  shoulder surgery bilat  RIGHT X 4; LEFT X 3  11/10/2015: TRANSRECTAL ULTRASOUND GUIDED PROSTATE BIOPSY; Bilateral      Comment:  Performed by Rudy Herring MD at [facility] OR  5/31/2017: ULTRASOUND-ENDOSCOPIC-UPPER; N/A      Comment:  Performed by Tra Brooks MD at [facility] ENDO (2ND FLR)  1/4/2016: ULTRASOUND-ENDOSCOPIC-UPPER; N/A      Comment:  Performed by Tra Brooks MD at [facility] ENDO (2ND FLR)  1/16/2015: ULTRASOUND-ENDOSCOPIC-UPPER; N/A      Comment:  Performed by Jason Saleem MD at [facility] ENDO (2ND FLR)    Patient likes blankets on.    Outcome: Ongoing (interventions implemented as appropriate)  Pt remains drowsy; only following simple commands. URE do not withdraw. Pt in afib throughout shift. Pt on room air. Heparin gtt continued. CT done. US of URE completed for swelling/tenderness; Omaira and PIV removed.  POC reviewed with pt and family at 1400. Pt verbalized understanding. Questions and concerns addressed. No acute events today. Pt progressing toward goals. Will continue to monitor. See flowsheets for full assessment and VS info.

## 2019-03-28 NOTE — PROGRESS NOTES
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     Review of patient's allergies indicates:   Allergen Reactions    Ace inhibitors Other (See Comments)     Cough    Arb-angiotensin receptor antagonist Itching    Eplerenone Other (See Comments)     Marked bradycardia, 40, tiredness and weakness      Sulfa (sulfonamide antibiotics) Itching     Patient says this was 10 years ago and doesn't remember what happened     Current Facility-Administered Medications   Medication Frequency    0.9%  NaCl infusion PRN    0.9%  NaCl infusion Once    acetaminophen tablet 650 mg Q6H PRN    albuterol-ipratropium 2.5 mg-0.5 mg/3 mL nebulizer solution 3 mL Q4H PRN    allopurinol tablet 100 mg Daily    aspirin EC tablet 81 mg Daily    atorvastatin tablet 80 mg Daily    carvedilol tablet 50 mg BID WM    dextrose 50% injection 12.5 g PRN    dextrose 50% injection 25 g PRN    finasteride tablet 5 mg Daily    gabapentin capsule 300 mg QHS    glucagon (human recombinant) injection 1 mg PRN    glucose chewable tablet 16 g PRN    glucose chewable tablet 24 g PRN    heparin 25,000 units in dextrose 5% (100 units/ml) IV bolus from bag - ADDITIONAL PRN BOLUS - 30 units/kg (max  bolus 4000 units) PRN    heparin 25,000 units in dextrose 5% (100 units/ml) IV bolus from bag - ADDITIONAL PRN BOLUS - 60 units/kg (max bolus 4000 units) PRN    heparin 25,000 units in dextrose 5% 250 mL (100 units/mL) infusion LOW INTENSITY nomogram - [facility] Continuous    insulin aspart U-100 pen 1-10 Units QID (AC + HS) PRN    insulin detemir U-100 pen 8 Units Daily    losartan tablet 100 mg QHS    nitroGLYCERIN SL tablet 0.4 mg Q5 Min PRN    ondansetron tablet 8 mg Q6H PRN    pantoprazole EC tablet 40 mg Daily    polyethylene glycol packet 17 g Daily    senna-docusate 8.6-50 mg per tablet 1 tablet BID    tamsulosin 24 hr capsule 0.4 mg Daily    torsemide tablet 20 mg BID    warfarin (COUMADIN) tablet 5 mg Daily       Objective:     Vital Signs (Most Recent):  Temp: 98 °F (36.7 °C) (03/28/19 0701)  Pulse: 80 (03/28/19 0701)  Resp: (!) 22 (03/28/19 0701)  BP: (!) 167/69 (03/28/19 0701)  SpO2: 97 % (03/28/19 0701)  O2 Device (Oxygen Therapy): room air (03/28/19 0701) Vital Signs (24h Range):  Temp:  [98 °F (36.7 °C)-98.8 °F (37.1 °C)] 98 °F (36.7 °C)  Pulse:  [64-90] 80  Resp:  [11-31] 22  SpO2:  [95 %-100 %] 97 %  BP: (136-202)/() 167/69  Arterial Line BP: (116-180)/(58-84) 162/75     Weight: 106.6 kg (235 lb 0.2 oz) (03/24/19 0600)  Body mass index is 31.01 kg/m².  Body surface area is 2.34 meters squared.    I/O last 3 completed shifts:  In: 2236.2 [P.O.:50; I.V.:686.2; Other:1500]  Out: 1897 [Urine:335; Other:1561; Stool:1]    Physical Exam   Constitutional: No distress.   HENT:   Head: Normocephalic.   Right Ear: External ear normal.   Eyes: Right eye exhibits no discharge. Left eye exhibits no discharge.   Cardiovascular: An irregular rhythm present.   Pulmonary/Chest: Effort normal. No respiratory distress.   Abdominal: Soft.   Neurological: He is alert.   Skin: Skin is warm.       Significant Labs:  ABGs:   Recent Labs   Lab 03/24/19  1419   PH 7.397   PCO2 31.7*   HCO3 19.5*   POCSATURATED  97   BE -5     BMP:   Recent Labs   Lab 03/28/19  0256   GLU 83      CO2 25   BUN 45*   CREATININE 3.7*   CALCIUM 7.5*   MG 1.5*     CBC:   Recent Labs   Lab 03/28/19  0033   WBC 7.25   RBC 3.22*   HGB 10.3*   HCT 29.9*   *   MCV 93   MCH 32.0*   MCHC 34.4     CMP:   Recent Labs   Lab 03/28/19  0256   GLU 83   CALCIUM 7.5*   ALBUMIN 2.6*   PROT 4.8*      K 3.8   CO2 25      BUN 45*   CREATININE 3.7*   ALKPHOS 79   ALT 37   AST 15   BILITOT 1.0     All labs within the past 24 hours have been reviewed.       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 Valderrama in [place]  -HD tx days: MWF  -HD tx time: 4 hours  -HD access: AVF   -Last HD: yesterday  -EDW: 106.0kg  -RRF: yes      Assessment and plan:  - No need for dialysis today  - Sodium goal between 140-145          Wilfred Pittman  Nephrology  Fellow  [facility]    Pager [phone]

## 2019-03-28 NOTE — ASSESSMENT & PLAN NOTE
- tight swelling of R forearm  - suggest removal of arterial line  -  Suggest switch venous access - obtain access via IJ through single or multilumen line  - suggest US right forearm for DVT and obtain orthopedics consult

## 2019-03-28 NOTE — ASSESSMENT & PLAN NOTE
-- H/o CAD/ CABG 2007  -- Continue ASA 81 + Atorvastatin 80   -- Heparin gtt. Intiated bridge to coumadin on 3/27.  -- Appreciate cardiology recs.

## 2019-03-28 NOTE — ASSESSMENT & PLAN NOTE
78 yr old male with acute ischemic infarct in the L mesial temporal lobe, splenium of corpus and thalamus. CTA with L vert and PCA stenosis along with bilateral ICA stenosis R>L  MRI from 3/24 with L thalamic stroke. MRI from 3/26 with evolution of L thalamic stroke and multiple new new infarcts in the L PCa territory.     -- Etiology cardioembolic vs atheroembolic.  -- No tPA  -- ASA 81 + Atorvastatin 80mg  -- Heparin gtt for Afib. Bridging to Coumadin.  -- Neuro checks q1  -- Patient was pressure dependent since admission with worsening of symptoms on sitting upright/SBP<170-180. Current goal SBP<200 as stroke has completed and patient is on maximal medical therapy.  --  PT/OT  -- TTE with bi atrial enlargement and PFO (L-->R shunt).  -- US LE with nearly occlusive thrombus in the peroneal veins.  -- STAT CT head on 3/28 done for acute worsening of RSW. No acute hemorrhage or new ischemic stroke noted.  -- Appreciate vascular neurology recs.

## 2019-03-28 NOTE — NURSING
1213 Neuro exam remains the same; NCC notified and STAT CT ordered. ED CT and 2nd floor CT contacted and will call RN back when scanner available.  1225 pt SpO2 fluctuating between %. Placed on 2 L NC. Pt previously AFib 70s-80s, currently AFib/Aflutter with HR 55-65. BP 120s/60. MD Ethan notified of changes. Will complete CT as soon as available and update NCC.

## 2019-03-28 NOTE — SUBJECTIVE & OBJECTIVE
Neurologic Chief Complaint: right sided weakness    Subjective:     Interval History: Patient is seen for follow-up neurological assessment and treatment recommendations: see hospital course for interval history    HPI, Past Medical, Family, and Social History remains the same as documented in the initial encounter.     Review of Systems   Constitutional: Negative for chills, fatigue and fever.   HENT: Negative for postnasal drip and sore throat.    Eyes: Negative.    Respiratory: Positive for shortness of breath. Negative for chest tightness.    Cardiovascular: Negative for chest pain, palpitations and leg swelling.   Gastrointestinal: Negative for abdominal pain, blood in stool, constipation and nausea.   Endocrine: Negative.    Genitourinary: Negative for dysuria.   Musculoskeletal: Negative for arthralgias, back pain and joint swelling.   Skin: Negative.    Allergic/Immunologic: Negative.    Neurological: Positive for dizziness, speech difficulty and weakness. Negative for light-headedness and headaches.   Psychiatric/Behavioral: Positive for confusion. Negative for dysphoric mood. The patient is not nervous/anxious and is not hyperactive.      Scheduled Meds:   sodium chloride 0.9%   Intravenous Once    allopurinol  100 mg Oral Daily    aspirin  81 mg Oral Daily    atorvastatin  80 mg Oral Daily    carvedilol  50 mg Oral BID WM    finasteride  5 mg Oral Daily    gabapentin  300 mg Oral QHS    insulin detemir U-100  8 Units Subcutaneous Daily    losartan  100 mg Oral QHS    pantoprazole  40 mg Oral Daily    polyethylene glycol  17 g Oral Daily    senna-docusate 8.6-50 mg  1 tablet Oral BID    tamsulosin  1 capsule Oral Daily    torsemide  20 mg Oral BID    warfarin  5 mg Oral Daily     Continuous Infusions:   heparin (porcine) in D5W 14 Units/kg/hr (03/28/19 0900)     PRN Meds:sodium chloride 0.9%, acetaminophen, albuterol-ipratropium, dextrose 50%, dextrose 50%, glucagon (human recombinant),  glucose, glucose, heparin (PORCINE), heparin (PORCINE), insulin aspart U-100, nitroGLYCERIN, ondansetron    Objective:     Vital Signs (Most Recent):  Temp: 98 °F (36.7 °C) (03/28/19 0701)  Pulse: 80 (03/28/19 0701)  Resp: (!) 22 (03/28/19 0701)  BP: (!) 167/69 (03/28/19 0701)  SpO2: 97 % (03/28/19 0701)  BP Location: Left leg    Vital Signs Range (Last 24H):  Temp:  [98 °F (36.7 °C)-98.8 °F (37.1 °C)]   Pulse:  [64-90]   Resp:  [11-31]   BP: (136-202)/()   SpO2:  [95 %-100 %]   Arterial Line BP: (116-180)/(58-84)   BP Location: Left leg    Physical Exam   Constitutional: He is oriented to person, place, and time. He appears well-developed and well-nourished. He is cooperative.  Non-toxic appearance. He does not have a sickly appearance. He does not appear ill. No distress.   HENT:   Head: Normocephalic and atraumatic.   Eyes: Pupils are equal, round, and reactive to light.   Cardiovascular: S1 normal, S2 normal and intact distal pulses. An irregularly irregular rhythm present.   Murmur heard.  Pulses:       Radial pulses are 2+ on the right side, and 2+ on the left side.        Dorsalis pedis pulses are 2+ on the right side, and 2+ on the left side.   Pulmonary/Chest: He has decreased breath sounds. He has rales.   Abdominal: Soft.   Musculoskeletal: He exhibits no edema.   Neurological: He is alert and oriented to person, place, and time. No cranial nerve deficit or sensory deficit.   2/5 strength on R UE - worse d/d  5/5 in all other extremities   Some evidence of hemispatial neglect   Dysarthria with errors in language and issues with R field of vision (R superior quadrantanopsia)  Worsening aphasia    Skin: Skin is warm and dry.   Tense R forearm compartment  Unable to properly assess sensation for parasthesia  Pulses remain present - all IV lines running on R arm/Arterial line as well  Upper arm with flaccid compartments    Nursing note and vitals reviewed.      Neurological Exam:   LOC: less alert d/d  with increasing confusion  Language: + aphasia, dysarthria challenges in seeing/expressing words/letters in R field of vision suggestive of R superior quadrantanopsia   Pupils (CN II, III): PERRL    Laboratory:  Recent Results (from the past 24 hour(s))   Protime-INR    Collection Time: 03/27/19 10:32 AM   Result Value Ref Range    Prothrombin Time 11.4 9.0 - 12.5 sec    INR 1.1 0.8 - 1.2   POCT glucose    Collection Time: 03/27/19 11:45 AM   Result Value Ref Range    POCT Glucose 173 (H) 70 - 110 mg/dL   Comprehensive metabolic panel    Collection Time: 03/27/19  1:51 PM   Result Value Ref Range    Sodium 138 136 - 145 mmol/L    Potassium 4.1 3.5 - 5.1 mmol/L    Chloride 105 95 - 110 mmol/L    CO2 23 23 - 29 mmol/L    Glucose 158 (H) 70 - 110 mg/dL    BUN, Bld 86 (H) 8 - 23 mg/dL    Creatinine 5.5 (H) 0.5 - 1.4 mg/dL    Calcium 6.9 (LL) 8.7 - 10.5 mg/dL    Total Protein 4.7 (L) 6.0 - 8.4 g/dL    Albumin 2.6 (L) 3.5 - 5.2 g/dL    Total Bilirubin 0.8 0.1 - 1.0 mg/dL    Alkaline Phosphatase 82 55 - 135 U/L    AST 17 10 - 40 U/L    ALT 45 (H) 10 - 44 U/L    Anion Gap 10 8 - 16 mmol/L    eGFR if African American 10.5 (A) >60 mL/min/1.73 m^2    eGFR if non  9.1 (A) >60 mL/min/1.73 m^2   Calcium, ionized    Collection Time: 03/27/19  2:50 PM   Result Value Ref Range    Calcium, Ion 0.88 (L) 1.06 - 1.42 mmol/L   POCT glucose    Collection Time: 03/27/19  5:13 PM   Result Value Ref Range    POCT Glucose 113 (H) 70 - 110 mg/dL   POCT glucose    Collection Time: 03/27/19  8:39 PM   Result Value Ref Range    POCT Glucose 116 (H) 70 - 110 mg/dL   Basic metabolic panel    Collection Time: 03/27/19  9:30 PM   Result Value Ref Range    Sodium 135 (L) 136 - 145 mmol/L    Potassium 3.4 (L) 3.5 - 5.1 mmol/L    Chloride 102 95 - 110 mmol/L    CO2 24 23 - 29 mmol/L    Glucose 103 70 - 110 mg/dL    BUN, Bld 53 (H) 8 - 23 mg/dL    Creatinine 3.5 (H) 0.5 - 1.4 mg/dL    Calcium 7.2 (L) 8.7 - 10.5 mg/dL    Anion Gap 9 8 - 16  mmol/L    eGFR if  18.1 (A) >60 mL/min/1.73 m^2    eGFR if non  15.7 (A) >60 mL/min/1.73 m^2   Magnesium    Collection Time: 03/27/19  9:30 PM   Result Value Ref Range    Magnesium 1.9 1.6 - 2.6 mg/dL   Basic metabolic panel    Collection Time: 03/28/19 12:33 AM   Result Value Ref Range    Sodium 140 136 - 145 mmol/L    Potassium 3.7 3.5 - 5.1 mmol/L    Chloride 105 95 - 110 mmol/L    CO2 27 23 - 29 mmol/L    Glucose 79 70 - 110 mg/dL    BUN, Bld 42 (H) 8 - 23 mg/dL    Creatinine 3.2 (H) 0.5 - 1.4 mg/dL    Calcium 7.6 (L) 8.7 - 10.5 mg/dL    Anion Gap 8 8 - 16 mmol/L    eGFR if African American 20.2 (A) >60 mL/min/1.73 m^2    eGFR if non African American 17.5 (A) >60 mL/min/1.73 m^2   Magnesium    Collection Time: 03/28/19 12:33 AM   Result Value Ref Range    Magnesium 1.7 1.6 - 2.6 mg/dL   Protime-INR    Collection Time: 03/28/19 12:33 AM   Result Value Ref Range    Prothrombin Time 11.2 9.0 - 12.5 sec    INR 1.1 0.8 - 1.2   CBC auto differential    Collection Time: 03/28/19 12:33 AM   Result Value Ref Range    WBC 7.25 3.90 - 12.70 K/uL    RBC 3.22 (L) 4.60 - 6.20 M/uL    Hemoglobin 10.3 (L) 14.0 - 18.0 g/dL    Hematocrit 29.9 (L) 40.0 - 54.0 %    MCV 93 82 - 98 fL    MCH 32.0 (H) 27.0 - 31.0 pg    MCHC 34.4 32.0 - 36.0 g/dL    RDW 15.4 (H) 11.5 - 14.5 %    Platelets 101 (L) 150 - 350 K/uL    MPV 10.3 9.2 - 12.9 fL    Immature Granulocytes 1.1 (H) 0.0 - 0.5 %    Gran # (ANC) 5.3 1.8 - 7.7 K/uL    Immature Grans (Abs) 0.08 (H) 0.00 - 0.04 K/uL    Lymph # 1.4 1.0 - 4.8 K/uL    Mono # 0.5 0.3 - 1.0 K/uL    Eos # 0.1 0.0 - 0.5 K/uL    Baso # 0.01 0.00 - 0.20 K/uL    nRBC 0 0 /100 WBC    Gran% 72.4 38.0 - 73.0 %    Lymph% 18.8 18.0 - 48.0 %    Mono% 6.5 4.0 - 15.0 %    Eosinophil% 1.1 0.0 - 8.0 %    Basophil% 0.1 0.0 - 1.9 %    Differential Method Automated    APTT    Collection Time: 03/28/19 12:33 AM   Result Value Ref Range    aPTT 59.7 (H) 21.0 - 32.0 sec   Comprehensive metabolic  panel    Collection Time: 03/28/19  2:56 AM   Result Value Ref Range    Sodium 140 136 - 145 mmol/L    Potassium 3.8 3.5 - 5.1 mmol/L    Chloride 104 95 - 110 mmol/L    CO2 25 23 - 29 mmol/L    Glucose 83 70 - 110 mg/dL    BUN, Bld 45 (H) 8 - 23 mg/dL    Creatinine 3.7 (H) 0.5 - 1.4 mg/dL    Calcium 7.5 (L) 8.7 - 10.5 mg/dL    Total Protein 4.8 (L) 6.0 - 8.4 g/dL    Albumin 2.6 (L) 3.5 - 5.2 g/dL    Total Bilirubin 1.0 0.1 - 1.0 mg/dL    Alkaline Phosphatase 79 55 - 135 U/L    AST 15 10 - 40 U/L    ALT 37 10 - 44 U/L    Anion Gap 11 8 - 16 mmol/L    eGFR if African American 16.9 (A) >60 mL/min/1.73 m^2    eGFR if non  14.7 (A) >60 mL/min/1.73 m^2   Magnesium    Collection Time: 03/28/19  2:56 AM   Result Value Ref Range    Magnesium 1.5 (L) 1.6 - 2.6 mg/dL   Phosphorus    Collection Time: 03/28/19  2:56 AM   Result Value Ref Range    Phosphorus 3.6 2.7 - 4.5 mg/dL         Diagnostic Results     Brain Imaging     MRI 3/26/19  New acute left PCA territory distribution infarctions involving the left paramedian occipital lobe, parahippocampal region and left splenium of the corpus callosum with additional acute evolving involving infarctions of the left thalamus.  No evidence of superimposed hemorrhage or hydrocephalus.  2. Generalized cerebral volume loss and findings suggestive of significant chronic microvascular ischemic change.      Under vessel imaging  Vessel Imaging   VAS US GIOVANY LEGS  Report Summary:  Impression:   Right Leg:  Color flow evaluation of the lower extremity demonstrates fully compressible and patent veins. There is no evidence of venous thrombosis in the deep or superficial veins.  Incidental finding of significant reflux noted in the CFV.    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 There is no evidence of venous thrombosis in the remaining deep veins.    VAS US Carotid  Bilaterally  Report Summary:  Impression:   RIGHT SIDE:  Velocities are suggestive of a 40-59% right ICA stenosis; however, it  visually appears to be in the 60-79% range.   Calcification of the right internal carotid artery, which denies complete visualization.   Heterogeneous plaque in the right common carotid artery.   Antegrade flow in the right vertebral artery.   LEFT SIDE:  1 - 39% left ICA stenosis.   Calcification of the left internal carotid artery.   Heterogeneous plaque in the left common carotid artery.   Retrograde flow in the left vertebral artery.    MRI 3/24 Brain Ischemic protocol   Punctate posterior left thalamic acute infarction.  No mass effect or hemorrhage.    Focal decreased signal within the proximal left PCA, suggestive of high-grade stenosis.    Moderate to high-grade narrowing of the intra cavernous/ supraclinoid ICAs bilaterally.    High-grade stenosis distal left vertebral artery.    CTA STROKE MULTIPHASE 3/24  No acute intracranial hemorrhage or mass effect.  No ischemic changes appreciated on CT.    Extensive calcified atherosclerotic disease resulting:    Left PCA with a high-grade stenosis.    High-grade (>70%) stenosis right proximal ICA and moderate (50-70%) stenosis left proximal ICA.    Moderate to high-grade stenosis of the intra cavernous/ supraclinoid ICAs bilaterally.    High-grade stenosis distal left vertebral artery.    Cardiac Imaging   TRANSTHORACIC ECHO (TTE) COMPLETE 3/23/2019  · Eccentric left ventricular hypertrophy.Normal left ventricular systolic function. The estimated ejection fraction is 55%  · Severe left atrial enlargement.  · Indeterminate left ventricular diastolic function.  · Moderate aortic valve stenosis. Aortic valve area is 1.37 cm2; peak velocity is 2.78 m/s; mean gradient is 22.39 mmHg. Mild aortic regurgitation.  · Mild-to-moderate mitral regurgitation.  · Mild to moderate tricuspid regurgitation.  · The estimated PA systolic pressure is 41 mm  Hg  · Severe right atrial enlargement.  · Normal right ventricular systolic function. The right ventricle is mildly dilated.  · Patent foramen ovale present with left to right shunting indicated by color flow Doppler.  · Normal central venous pressure (3 mm Hg).  · Pulmonary hypertension present.

## 2019-03-28 NOTE — ASSESSMENT & PLAN NOTE
Likely embolic, cardiac given his history of Afib and CAD but also possible that it was formed from in situ thrombosis at area of significant plaque/calcification.   Interval MRI with expected extension of infarct within the left PCA territory involving thalamocapsular region as well as medial temporal lobe correlating to his worsening symptoms of thalamic aphasia and visual defect and worsening strength in R UE now 2/5 in RUE. Aphasia/Dysarthria worsening.     Antithrombotics for secondary stroke prevention: Antiplatelets: Aspirin: 81 mg daily    Statins for secondary stroke prevention and hyperlipidemia, if present:   Statins: Atorvastatin- 80 mg daily    Aggressive risk factor modification: HTN, DM, HLD, A-Fib, CAD     Rehab efforts: PT/OT/SLP to evaluate and treat, PM&R consult     Diagnostics ordered/pending: none. Will need to have R forearm evaluated by US and by Orthopedics for compartment syndrome. Additionally, will need re-eval by SLP for swallowing abilities. Make NPO     VTE prophylaxis: Heparin gtt and warfarin     BP parameters: Infarct: No intervention, SBP <220

## 2019-03-28 NOTE — NURSING
Pt no longer withdrawing to noxious stimuli on RUE or LLE. MD Ethan at the bedside. Awaiting new orders.

## 2019-03-28 NOTE — PLAN OF CARE
SW met with Pt daughter regarding her request for a SNF list. Provided list at bedside.    Latasha Nieves LMSW  Neurocritical Care   [facility]  [id number]

## 2019-03-28 NOTE — NURSING
Pt speech more slurred, following commands, expressively aphasic, withdrawing to pain on RUE (previous slight spontaneous movement to commands); HOB lowered to 15 degrees. MD Hilda notified and at the bedside for exam. Orders to keep pt flat until 1210 and if exam does not improve will place CT head orders. Heparin gtt turned off per NCC. Will continue to monitor.

## 2019-03-28 NOTE — PROGRESS NOTES
[facility]  Neurocritical Care  Progress Note    Admit Date: 3/22/2019  Service Date: 03/28/2019  Length of Stay: 6    Subjective:     Chief Complaint: 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    History of Present Illness: Patient is a 77 yo male with PMH HTN, AFIB, CAD s/p CABG 2007, HFpEF, HLD, DM, ESRD, and Asthma transferred initially for evaluation of unstable angina until becoming symptomatic with R side weakness and dysarthria. Patient was admitted to Hospital Medicine 3/22 for evaluation of unstable angina. On 3/24 @ 1000, the patient's daughter came to visit and she noticed him lethargic, disoriented which soon progressed to include facial droop and dysarthria. A sroke code was called in response to new onset R weakness and dysarthria. MRI was completed noting Acute L PCA infarct. Per Vascular Neuro staff, the symptoms improved while supine during imaging. The patient was subsequently transferred to [facility] for further management. The patient does have a history of coumadin and ASA use. During admission he was placed on a heparin drip for Afib but it was quickly discontinued after the patient began to experience feeling of euphoria which were resolved with discontinuation of heparin gtt.              Hospital Course: 3/25: NAEO.   3/26 Had few beats of Vtach this am   3/27: NAEO  3/28: NAEO  03/28/2019 RUE appeared swollen and tender with worsening weakness. Repeat CT with no new infarct or hemorrhage. Placentia and IV removed. US RUE ordered, orthopedics consulted. EEG pending.      Review of Systems   Unable to perform ROS: Mental status change     Objective:     Vitals:  Temp: 98.2 °F (36.8 °C)  Pulse: 69  Rhythm: atrial rhythm  BP: (!) 147/65  MAP (mmHg): 93  Resp: 16  SpO2: 100 %  O2 Device (Oxygen Therapy): nasal cannula    Temp  Min: 98 °F (36.7 °C)  Max: 98.8 °F (37.1 °C)  Pulse  Min: 64  Max: 90  BP  Min: 126/62  Max: 181/73  MAP (mmHg)  Min: 75  Max: 126  Resp  Min:  11  Max: 31  SpO2  Min: 95 %  Max: 100 %    03/27 0701 - 03/28 0700  In: 2023.8 [P.O.:50; I.V.:473.8]  Out: 1722 [Urine:160]   Unmeasured Output  Urine Occurrence: 1  Stool Occurrence: 1  Pad Count: 1       Physical Exam   Constitutional: No distress.   Eyes: Pupils are equal, round, and reactive to light.   Cardiovascular: Normal rate. An irregularly irregular rhythm present.   Neurological:   [id number]  Follows commands. Dysarthric+.   Pupils brisk bilaterally.  RUE 0/5  RLE 1/5  LUE 5/5  LLE 4/5    NIHSS - 17        Nursing note and vitals reviewed.        Medications:  Continuous  heparin (porcine) in D5W Last Rate: 14 Units/kg/hr (03/28/19 1350)   Scheduled  sodium chloride 0.9%  Once   allopurinol 100 mg Daily   aspirin 81 mg Daily   atorvastatin 80 mg Daily   carvedilol 50 mg BID WM   finasteride 5 mg Daily   gabapentin 300 mg QHS   insulin detemir U-100 8 Units Daily   losartan 100 mg QHS   pantoprazole 40 mg Daily   polyethylene glycol 17 g Daily   senna-docusate 8.6-50 mg 1 tablet BID   tamsulosin 1 capsule Daily   torsemide 20 mg BID   warfarin 5 mg Daily   PRN  sodium chloride 0.9%  PRN   acetaminophen 650 mg Q6H PRN   albuterol-ipratropium 3 mL Q4H PRN   dextrose 50% 12.5 g PRN   dextrose 50% 25 g PRN   glucagon (human recombinant) 1 mg PRN   glucose 16 g PRN   glucose 24 g PRN   heparin (PORCINE) 30 Units/kg (Adjusted) PRN   heparin (PORCINE) 44.2 Units/kg (Adjusted) PRN   insulin aspart U-100 1-10 Units QID (AC + HS) PRN   nitroGLYCERIN 0.4 mg Q5 Min PRN   ondansetron 8 mg Q6H PRN     Today I personally reviewed pertinent medications, lines/drains/airways, imaging, cardiology results, laboratory results, notably:    Diet  Diet diabetic [facility]; 2000 Calorie; Cardiac (Low Na/Chol), Renal, Coumadin Restriction; Nectar Thick  Diet diabetic [facility]; 2000 Calorie; Cardiac (Low Na/Chol), Renal, Coumadin Restriction; Nectar Thick      Assessment/Plan:     Neuro  * 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 CTA with L vert and PCA stenosis along with bilateral ICA stenosis R>L  MRI from 3/24 with L thalamic stroke. MRI from 3/26 with evolution of L thalamic stroke and multiple new new infarcts in the L PCa territory.     -- Etiology cardioembolic vs atheroembolic.  -- No tPA  -- ASA 81 + Atorvastatin 80mg  -- Heparin gtt for Afib. Bridging to Coumadin.  -- Neuro checks q1  -- Patient was pressure dependent since admission with worsening of symptoms on sitting upright/SBP<170-180. Current goal SBP<200 as stroke has completed and patient is on maximal medical therapy.  --  PT/OT  -- TTE with bi atrial enlargement and PFO (L-->R shunt).  -- US LE with nearly occlusive thrombus in the peroneal veins.  -- STAT CT head on 3/28 done for acute worsening of RSW. No acute hemorrhage or new ischemic stroke noted.  -- Appreciate vascular neurology recs.         Encephalopathy  -- Fluctuating mental status.  -- Similar episode 2 nights ago with improvement by the next morning. Sundowning?  -- EEG    Pulmonary  Severe persistent asthma with exacerbation  -- Started on Azithromycin and steroids at OSH due to concern for Asthma.  -- Pulmonology consulted, appreciate recs. No concern for asthma per their recs.  -- Abx and steroids d/derrick.  -- Duonebs PRN    Cardiac/Vascular  Paroxysmal atrial fibrillation  -- Rate controlled  -- Heparin gtt, bridging to Coumadin.    NSTEMI (non-ST elevated myocardial infarction)  -- Transferred from [facility] for [facility] but patient developed a L PCA infarct shortly after admission.  -- Cardiology consulted on arrival, appreciate recs.  -- No intervention for now, recommending maximal medical therapy  -- On heparin gtt for Afib. Started bridge to coumadin on 3/27.  -- ASA 81, Atorvastatin 80 + Coreg 50 BID      CAD (coronary artery disease), 2V CABG 2007  --  H/o CAD/ CABG 2007  -- Continue ASA 81 + Atorvastatin 80   -- Heparin gtt. Intiated bridge to coumadin on 3/27.  -- Appreciate cardiology recs.      Hyperlipidemia, baseline   -- LDL 46  -- High intensity statin for carotid stenosis and L PCA stroke.    Essential hypertension  -- Target SBP<200    Carotid artery stenosis  -- No surgical intervention per vascular surgery recs. Medical management for now      Renal/  ESRD (end stage renal disease) on dialysis  -- H/o ESRD on HD (MWF)  -- LUE AVF+. Ultrasound on 3/25 showed patency of the fistula site.  -- Nephrology following, appreciate recs.  -- Noted to have mild oozing from fistula site on 3/28. No tenderness/swelling. Continuing heparin gtt for now.    Benign prostatic hyperplasia without lower urinary tract symptoms  -- Continue Finasteride and Flomax.    Oncology  Anemia of chronic disease  -- H/h stable.  -- On heparin gtt  -- Daily CBC    Endocrine  Type 2 diabetes mellitus, without long-term current use of insulin  -- A1c 8.3  -- Lev 8 qHS + SSI AC&HS PRN  -- POC AC&HS    Orthopedic  Forearm swelling  -- Acutely swollen RUE  -- Omaira and IV removed with partial improvement of swelling.  -- No concern for compartment syndrome per Orthopedics. Appreciate recs.  -- Keep limb elevated.    Gout, arthritis  -- Allopurinol 100 daily    Other  SOB (shortness of breath)  -- Currently on RA          The patient is being Prophylaxed for:  Venous Thromboembolism with: Chemical  Stress Ulcer with: None  Ventilator Pneumonia with: none    Activity Orders          Diet diabetic [facility]; 2000 Calorie; Cardiac (Low Na/Chol), Renal, Coumadin Restriction; Nectar Thick: Diabetic starting at 03/28 1015        Full Code    Ethan Costello MD  Neurocritical Care  [facility]

## 2019-03-28 NOTE — ASSESSMENT & PLAN NOTE
Micheal Hunt is a 78 year old man who is ESRD which presented with L PCA infarct     Dialysis Information: [facility]  -Out patient HD unit: [facility]  -Nephrologist: Dr. Valderrama in [place]  -HD tx days: MWF  -HD tx time: 4 hours  -HD access: AVF   -Last HD: yesterday  -EDW: 106.0kg  -RRF: yes      Assessment and plan:  - No need for dialysis today  - Sodium goal between 140-145

## 2019-03-28 NOTE — ASSESSMENT & PLAN NOTE
-- Acutely swollen RUE  -- Omaira and IV removed with partial improvement of swelling.  -- No concern for compartment syndrome per Orthopedics. Appreciate recs.  -- Keep limb elevated.

## 2019-03-28 NOTE — SUBJECTIVE & OBJECTIVE
Review of Systems   Unable to perform ROS: Mental status change     Objective:     Vitals:  Temp: 98.2 °F (36.8 °C)  Pulse: 69  Rhythm: atrial rhythm  BP: (!) 147/65  MAP (mmHg): 93  Resp: 16  SpO2: 100 %  O2 Device (Oxygen Therapy): nasal cannula    Temp  Min: 98 °F (36.7 °C)  Max: 98.8 °F (37.1 °C)  Pulse  Min: 64  Max: 90  BP  Min: 126/62  Max: 181/73  MAP (mmHg)  Min: 75  Max: 126  Resp  Min: 11  Max: 31  SpO2  Min: 95 %  Max: 100 %    03/27 0701 - 03/28 0700  In: 2023.8 [P.O.:50; I.V.:473.8]  Out: 1722 [Urine:160]   Unmeasured Output  Urine Occurrence: 1  Stool Occurrence: 1  Pad Count: 1       Physical Exam   Constitutional: No distress.   Eyes: Pupils are equal, round, and reactive to light.   Cardiovascular: Normal rate. An irregularly irregular rhythm present.   Neurological:   [id number]  Follows commands. Dysarthric+.   Pupils brisk bilaterally.  RUE 0/5  RLE 1/5  LUE 5/5  LLE 4/5    NIHSS - 17        Nursing note and vitals reviewed.        Medications:  Continuous  heparin (porcine) in D5W Last Rate: 14 Units/kg/hr (03/28/19 1350)   Scheduled  sodium chloride 0.9%  Once   allopurinol 100 mg Daily   aspirin 81 mg Daily   atorvastatin 80 mg Daily   carvedilol 50 mg BID WM   finasteride 5 mg Daily   gabapentin 300 mg QHS   insulin detemir U-100 8 Units Daily   losartan 100 mg QHS   pantoprazole 40 mg Daily   polyethylene glycol 17 g Daily   senna-docusate 8.6-50 mg 1 tablet BID   tamsulosin 1 capsule Daily   torsemide 20 mg BID   warfarin 5 mg Daily   PRN  sodium chloride 0.9%  PRN   acetaminophen 650 mg Q6H PRN   albuterol-ipratropium 3 mL Q4H PRN   dextrose 50% 12.5 g PRN   dextrose 50% 25 g PRN   glucagon (human recombinant) 1 mg PRN   glucose 16 g PRN   glucose 24 g PRN   heparin (PORCINE) 30 Units/kg (Adjusted) PRN   heparin (PORCINE) 44.2 Units/kg (Adjusted) PRN   insulin aspart U-100 1-10 Units QID (AC + HS) PRN   nitroGLYCERIN 0.4 mg Q5 Min PRN   ondansetron 8 mg Q6H PRN     Today I personally  reviewed pertinent medications, lines/drains/airways, imaging, cardiology results, laboratory results, notably:    Diet  Diet diabetic [facility]; 2000 Calorie; Cardiac (Low Na/Chol), Renal, Coumadin Restriction; Nectar Thick  Diet diabetic [facility]; 2000 Calorie; Cardiac (Low Na/Chol), Renal, Coumadin Restriction; Nectar Thick

## 2019-03-28 NOTE — NURSING
Pts fistula dressing saturated with serosanguinous drainage. MD Neel with nephrology notified who stated to communicate with [facility] to consult vascular.   [id number] MD Ethan at the bedside to assess fistula site; new dressing placed. Stated would order ultrasound of RUE. Will continue to monitor.

## 2019-03-28 NOTE — ASSESSMENT & PLAN NOTE
-- Fluctuating mental status.  -- Similar episode 2 nights ago with improvement by the next morning. Sundowning?  -- EEG

## 2019-03-28 NOTE — PROGRESS NOTES
Latest lab result:    serum K: 3.4, Na 135.    Paged Nephro-oncall Dr. Shaikh, he ordered to changed Acid bath to 4K, Sodium set to 142.

## 2019-03-28 NOTE — PLAN OF CARE
Problem: SLP Goal  Goal: SLP Goal  Goals to be met 4/1  1 pt will tolerate regular diet and thin liquids/met  2 pt will participate in speech lang eval/met  3.  [place] to time and place  4.  Respond to categorization tasks with 80% accuracy  5.  Assess functional reading and writing skills  6.  Respond to problem solving tasks with 80% accuracy      Swallow re evaluated and recommend pt continue on regular diet and NECTAR Thick liquids. Crush all meds. No straws. Aspiration precautions and supervision with all meals.     KRISTIN Taveras, CCC-SLP  3/28/2019

## 2019-03-28 NOTE — PLAN OF CARE
03/28/19 1432   Discharge Reassessment   Assessment Type Discharge Planning Reassessment   Provided patient/caregiver education on the expected discharge date and the discharge plan No   Do you have any problems affording any of your prescribed medications? TBD   Discharge Plan A Skilled Nursing Facility   Discharge Plan B Home Health;Home with family   DME Needed Upon Discharge  other (see comments)  (tbd)   Patient choice form signed by patient/caregiver N/A   Anticipated Discharge Disposition SNF   Can the patient answer the patient profile reliably? No, cognitively impaired   How does the patient rate their overall health at the present time?   (jovanna)   Describe the patient's ability to walk at the present time. Major restrictions/daily assistance from another person   How often would a person be available to care for the patient? Often   Number of comorbid conditions (as recorded on the chart) Five or more   During the past month, has the patient often been bothered by feeling down, depressed or hopeless?   (jovanna)   During the past month, has the patient often been bothered by little interest or pleasure in doing things?   (jovanna)   Post-Acute Status   Post-Acute Authorization Placement   Post-Acute Placement Status Awaiting Internal Medical Clearance   Discharge Delays None       Gini Quiles RN, CCRN-K, [facility]  Neuro-Critical Care   [phone]

## 2019-03-28 NOTE — SUBJECTIVE & OBJECTIVE
Interval History:   Patient evaluated at bedside, no significant event overnight, had HD treatment done yesterday without any complications.     Review of patient's allergies indicates:   Allergen Reactions    Ace inhibitors Other (See Comments)     Cough    Arb-angiotensin receptor antagonist Itching    Eplerenone Other (See Comments)     Marked bradycardia, 40, tiredness and weakness      Sulfa (sulfonamide antibiotics) Itching     Patient says this was 10 years ago and doesn't remember what happened     Current Facility-Administered Medications   Medication Frequency    0.9%  NaCl infusion PRN    0.9%  NaCl infusion Once    acetaminophen tablet 650 mg Q6H PRN    albuterol-ipratropium 2.5 mg-0.5 mg/3 mL nebulizer solution 3 mL Q4H PRN    allopurinol tablet 100 mg Daily    aspirin EC tablet 81 mg Daily    atorvastatin tablet 80 mg Daily    carvedilol tablet 50 mg BID WM    dextrose 50% injection 12.5 g PRN    dextrose 50% injection 25 g PRN    finasteride tablet 5 mg Daily    gabapentin capsule 300 mg QHS    glucagon (human recombinant) injection 1 mg PRN    glucose chewable tablet 16 g PRN    glucose chewable tablet 24 g PRN    heparin 25,000 units in dextrose 5% (100 units/ml) IV bolus from bag - ADDITIONAL PRN BOLUS - 30 units/kg (max bolus 4000 units) PRN    heparin 25,000 units in dextrose 5% (100 units/ml) IV bolus from bag - ADDITIONAL PRN BOLUS - 60 units/kg (max bolus 4000 units) PRN    heparin 25,000 units in dextrose 5% 250 mL (100 units/mL) infusion LOW INTENSITY nomogram - [facility] Continuous    insulin aspart U-100 pen 1-10 Units QID (AC + HS) PRN    insulin detemir U-100 pen 8 Units Daily    losartan tablet 100 mg QHS    nitroGLYCERIN SL tablet 0.4 mg Q5 Min PRN    ondansetron tablet 8 mg Q6H PRN    pantoprazole EC tablet 40 mg Daily    polyethylene glycol packet 17 g Daily    senna-docusate 8.6-50 mg per tablet 1 tablet BID    tamsulosin 24 hr capsule 0.4 mg Daily     torsemide tablet 20 mg BID    warfarin (COUMADIN) tablet 5 mg Daily       Objective:     Vital Signs (Most Recent):  Temp: 98 °F (36.7 °C) (03/28/19 0701)  Pulse: 80 (03/28/19 0701)  Resp: (!) 22 (03/28/19 0701)  BP: (!) 167/69 (03/28/19 0701)  SpO2: 97 % (03/28/19 0701)  O2 Device (Oxygen Therapy): room air (03/28/19 0701) Vital Signs (24h Range):  Temp:  [98 °F (36.7 °C)-98.8 °F (37.1 °C)] 98 °F (36.7 °C)  Pulse:  [64-90] 80  Resp:  [11-31] 22  SpO2:  [95 %-100 %] 97 %  BP: (136-202)/() 167/69  Arterial Line BP: (116-180)/(58-84) 162/75     Weight: 106.6 kg (235 lb 0.2 oz) (03/24/19 0600)  Body mass index is 31.01 kg/m².  Body surface area is 2.34 meters squared.    I/O last 3 completed shifts:  In: 2236.2 [P.O.:50; I.V.:686.2; Other:1500]  Out: 1897 [Urine:335; Other:1561; Stool:1]    Physical Exam   Constitutional: No distress.   HENT:   Head: Normocephalic.   Right Ear: External ear normal.   Eyes: Right eye exhibits no discharge. Left eye exhibits no discharge.   Cardiovascular: An irregular rhythm present.   Pulmonary/Chest: Effort normal. No respiratory distress.   Abdominal: Soft.   Neurological: He is alert.   Skin: Skin is warm.       Significant Labs:  ABGs:   Recent Labs   Lab 03/24/19  1419   PH 7.397   PCO2 31.7*   HCO3 19.5*   POCSATURATED 97   BE -5     BMP:   Recent Labs   Lab 03/28/19  0256   GLU 83      CO2 25   BUN 45*   CREATININE 3.7*   CALCIUM 7.5*   MG 1.5*     CBC:   Recent Labs   Lab 03/28/19  0033   WBC 7.25   RBC 3.22*   HGB 10.3*   HCT 29.9*   *   MCV 93   MCH 32.0*   MCHC 34.4     CMP:   Recent Labs   Lab 03/28/19  0256   GLU 83   CALCIUM 7.5*   ALBUMIN 2.6*   PROT 4.8*      K 3.8   CO2 25      BUN 45*   CREATININE 3.7*   ALKPHOS 79   ALT 37   AST 15   BILITOT 1.0     All labs within the past 24 hours have been reviewed.

## 2019-03-28 NOTE — PROGRESS NOTES
Hemodialysis    Bedside HD treatment in room 9090A.  Patient is awake, on room air, responsive; with ongoing Heparin IV drip.  Patient is on room air, wake,   ACCESS: left upper arm AV fistula, mild hematoma noted, previous insertion site still bleeding when I removed the gauze that was applied from last HD session. Cannulated with gauge 15 needles smoothly.    HD started

## 2019-03-28 NOTE — ASSESSMENT & PLAN NOTE
-- Started on Azithromycin and steroids at OSH due to concern for Asthma.  -- Pulmonology consulted, appreciate recs. No concern for asthma per their recs.  -- Abx and steroids d/derrick.  -- Duonebs PRN

## 2019-03-28 NOTE — CARE UPDATE
Patient seen and examined, compartments soft and compressible. No concern for compartment syndrome.  Full consult to follow.    Tad Aguilar M.D. PGY2  Orthopaedic Surgery

## 2019-03-28 NOTE — PROGRESS NOTES
Hemodialysis    3-hour HD completed, patient tolerated well,left AV fistula with bruit and thrill, held pressure for 10 mins. Hemostasis achieved, access gauzed and taped.    NET UF REMOVED:  Zero as ordered.

## 2019-03-28 NOTE — ASSESSMENT & PLAN NOTE
Continue scheduled HD   Patient is being followed by nephrology, appreciate their assistance  Patient still makes urine - continue diuresis with torsemide  Patient tolerated HD

## 2019-03-28 NOTE — ASSESSMENT & PLAN NOTE
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  -- Cardiology consulted on arrival, appreciate recs.  -- No intervention for now, recommending maximal medical therapy  -- On heparin gtt for Afib. Started bridge to coumadin on 3/27.  -- ASA 81, Atorvastatin 80 + Coreg 50 BID

## 2019-03-28 NOTE — PROGRESS NOTES
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   Interval MRI with expected extension of infarct within the left PCA territory involving thalamocapsular region as well as medial temporal lobe correlating to his worsening symptoms of thalamic aphasia and visual defect and worsening strength in R UE now 2/5 in RUE. Aphasia/Dysarthria worsening.     Antithrombotics for secondary stroke prevention: Antiplatelets: Aspirin: 81 mg daily    Statins for secondary stroke prevention and hyperlipidemia, if present:   Statins: Atorvastatin- 80 mg daily    Aggressive risk factor modification: HTN, DM, HLD, A-Fib, CAD     Rehab efforts: PT/OT/SLP to evaluate and treat, PM&R consult     Diagnostics ordered/pending: none. Will need to have R forearm evaluated by US and by Orthopedics for compartment syndrome. Additionally, will need re-eval by SLP for swallowing abilities. Make NPO     VTE prophylaxis: Heparin gtt and warfarin     BP parameters: Infarct: No intervention, SBP <220        Dysarthria due to acute cerebrovascular accident (CVA)  - recommend patient be re-evaluated by SLP  - recommend NPO for diet     Forearm swelling  - tight swelling of R forearm  - suggest removal of arterial line  -  Suggest switch venous access - obtain access via IJ through single or multilumen line  - suggest US right forearm for DVT and obtain orthopedics consult     Paroxysmal atrial fibrillation  chadsvasc 6  stroke risk factor   on coreg for rate control     ESRD (end stage renal disease) on dialysis  Continue scheduled HD   Patient is being followed by nephrology, appreciate their assistance  Patient still makes urine - continue diuresis with  torsemide  Patient tolerated HD    Type 2 diabetes mellitus, without long-term current use of insulin  Lab Results   Component Value Date    HGBA1C 8.3 (H) 03/21/2019     [facility] risk factor   managed by primary team   Continue basal insulin with sliding scale per primary service (consider increasing basal with relatively high sliding scale demands)    Long term (current) use of anticoagulants  - continue heparin gtt with asa 81mg     Benign prostatic hyperplasia without lower urinary tract symptoms  - continue flomax and finasteride    Gout, arthritis  - continue allopurinol, patient would likely benefit to change dosing to post dialysis days only     CAD (coronary artery disease), 2V CABG 2007  Sto risk factor   No ongoing signs/symptoms of ischemia  Continue single Aspirin 81mg and heparin gtt  Continue coreg 25 bid and losartan 100mg  Continue high intensity statin        Hyperlipidemia, baseline   Lab Results   Component Value Date    LDLCALC 46.4 (L) 07/26/2018   [facility] risk factor   Continue atorvastatin 80 mg       Essential hypertension  Ok with hypertension in the setting of flow dependent stroke symptoms   Continue carvedilol, losartan and torsemide      Carotid artery stenosis  - noted on both CTA and on ultrasound of carotids  - patient evaluated by vascular who agree with maximal medical management         Admitted to hospital medicine for unstable angina eval, vascular neurology consulted for right sided weakness, facial droop and dysarthria, MRI showed L PCA infarction, not candidate for TPA ( symptoms duration >4 hours)patient symptoms improved with laying flat, admitted to neuro critical care. Patient with known PCA infarct on L with undulating symptom pattern with modification to level of recumbency. Patient restarted on heparin gtt. Underwent US of LE and of carotid with evidence of <50% L ICA stenosis and 60-70% R ICA stenosis, heavily calcified on CTA, additionally L vertebral also  heavily calcified. Patient tolerated HD. Neuro deficit appear to be more severe on 3/27 with decreasing right arm strength now 2/5 and with worsening dysarthria/aphasia. Patient with gross swelling of R forearm - tense, pulses intact.     STROKE DOCUMENTATION        NIH Scale:  1a. Level of Consciousness: 1-->Not alert, but arousable by minor stimulation to obey, answer, or respond  1b. LOC Questions: 1-->Answers one question correctly  1c. LOC Commands: 1-->Performs one task correctly  2. Best Gaze: 1-->Partial gaze palsy, gaze is abnormal in one or both eyes, but forced deviation or total gaze paresis is not present  3. Visual: 1-->Partial hemianopia  4. Facial Palsy: 1-->Minor paralysis (flattened nasolabial fold, asymmetry on smiling)  5a. Motor Arm, Left: 0-->No drift, limb holds 90 (or 45) degrees for full 10 secs  5b. Motor Arm, Right: 2-->Some effort against gravity, limb cannot get to or maintain (if cued) 90 (or 45) degrees, drifts down to bed, but has some effort against gravity  6a. Motor Leg, Left: 0-->No drift, leg holds 30 degree position for full 5 secs  6b. Motor Leg, Right: 0-->No drift, leg holds 30 degree position for full 5 secs  7. Limb Ataxia: 0-->Absent  8. Sensory: 1-->Mild-to-moderate sensory loss, patient feels pinprick is less sharp or is dull on the affected side, or there is a loss of superficial pain with pinprick, but patient is aware of being touched  9. Best Language: 2-->Severe aphasia, all communication is through fragmentary expression, great need for inference, questioning, and guessing by the listener. Range of information that can be exchanged is limited, listener carries burden of. . . (see row details)  10. Dysarthria: 2-->Severe dysarthria, patients speech is so slurred as to be unintelligible in the absence of or out of proportion to any dysphasia, or is mute/anarthric  11. Extinction and Inattention (formerly Neglect): 0-->No abnormality  Total (NIH Stroke Scale): 13        Modified Brimhall    Sachin Coma Scale:14   ABCD2 Score:    COFO4WC2-DAP Score:6  HAS -BLED Score:   ICH Score:   Hunt & Sharp Classification:      Hemorrhagic change of an Ischemic Stroke: Does this patient have an ischemic stroke with hemorrhagic changes? No     Neurologic Chief Complaint: right sided weakness    Subjective:     Interval History: Patient is seen for follow-up neurological assessment and treatment recommendations: see hospital course for interval history    HPI, Past Medical, Family, and Social History remains the same as documented in the initial encounter.     Review of Systems   Constitutional: Negative for chills, fatigue and fever.   HENT: Negative for postnasal drip and sore throat.    Eyes: Negative.    Respiratory: Positive for shortness of breath. Negative for chest tightness.    Cardiovascular: Negative for chest pain, palpitations and leg swelling.   Gastrointestinal: Negative for abdominal pain, blood in stool, constipation and nausea.   Endocrine: Negative.    Genitourinary: Negative for dysuria.   Musculoskeletal: Negative for arthralgias, back pain and joint swelling.   Skin: Negative.    Allergic/Immunologic: Negative.    Neurological: Positive for dizziness, speech difficulty and weakness. Negative for light-headedness and headaches.   Psychiatric/Behavioral: Positive for confusion. Negative for dysphoric mood. The patient is not nervous/anxious and is not hyperactive.      Scheduled Meds:   sodium chloride 0.9%   Intravenous Once    allopurinol  100 mg Oral Daily    aspirin  81 mg Oral Daily    atorvastatin  80 mg Oral Daily    carvedilol  50 mg Oral BID WM    finasteride  5 mg Oral Daily    gabapentin  300 mg Oral QHS    insulin detemir U-100  8 Units Subcutaneous Daily    losartan  100 mg Oral QHS    pantoprazole  40 mg Oral Daily    polyethylene glycol  17 g Oral Daily    senna-docusate 8.6-50 mg  1 tablet Oral BID    tamsulosin  1 capsule Oral Daily     torsemide  20 mg Oral BID    warfarin  5 mg Oral Daily     Continuous Infusions:   heparin (porcine) in D5W 14 Units/kg/hr (03/28/19 0900)     PRN Meds:sodium chloride 0.9%, acetaminophen, albuterol-ipratropium, dextrose 50%, dextrose 50%, glucagon (human recombinant), glucose, glucose, heparin (PORCINE), heparin (PORCINE), insulin aspart U-100, nitroGLYCERIN, ondansetron    Objective:     Vital Signs (Most Recent):  Temp: 98 °F (36.7 °C) (03/28/19 0701)  Pulse: 80 (03/28/19 0701)  Resp: (!) 22 (03/28/19 0701)  BP: (!) 167/69 (03/28/19 0701)  SpO2: 97 % (03/28/19 0701)  BP Location: Left leg    Vital Signs Range (Last 24H):  Temp:  [98 °F (36.7 °C)-98.8 °F (37.1 °C)]   Pulse:  [64-90]   Resp:  [11-31]   BP: (136-202)/()   SpO2:  [95 %-100 %]   Arterial Line BP: (116-180)/(58-84)   BP Location: Left leg    Physical Exam   Constitutional: He is oriented to person, place, and time. He appears well-developed and well-nourished. He is cooperative.  Non-toxic appearance. He does not have a sickly appearance. He does not appear ill. No distress.   HENT:   Head: Normocephalic and atraumatic.   Eyes: Pupils are equal, round, and reactive to light.   Cardiovascular: S1 normal, S2 normal and intact distal pulses. An irregularly irregular rhythm present.   Murmur heard.  Pulses:       Radial pulses are 2+ on the right side, and 2+ on the left side.        Dorsalis pedis pulses are 2+ on the right side, and 2+ on the left side.   Pulmonary/Chest: He has decreased breath sounds. He has rales.   Abdominal: Soft.   Musculoskeletal: He exhibits no edema.   Neurological: He is alert and oriented to person, place, and time. No cranial nerve deficit or sensory deficit.   2/5 strength on R UE - worse d/d  5/5 in all other extremities   Some evidence of hemispatial neglect   Dysarthria with errors in language and issues with R field of vision (R superior quadrantanopsia)  Worsening aphasia    Skin: Skin is warm and dry.   Tense R  forearm compartment  Unable to properly assess sensation for parasthesia  Pulses remain present - all IV lines running on R arm/Arterial line as well  Upper arm with flaccid compartments    Nursing note and vitals reviewed.      Neurological Exam:   LOC: less alert d/d with increasing confusion  Language: + aphasia, dysarthria challenges in seeing/expressing words/letters in R field of vision suggestive of R superior quadrantanopsia   Pupils (CN II, III): PERRL    Laboratory:  Recent Results (from the past 24 hour(s))   Protime-INR    Collection Time: 03/27/19 10:32 AM   Result Value Ref Range    Prothrombin Time 11.4 9.0 - 12.5 sec    INR 1.1 0.8 - 1.2   POCT glucose    Collection Time: 03/27/19 11:45 AM   Result Value Ref Range    POCT Glucose 173 (H) 70 - 110 mg/dL   Comprehensive metabolic panel    Collection Time: 03/27/19  1:51 PM   Result Value Ref Range    Sodium 138 136 - 145 mmol/L    Potassium 4.1 3.5 - 5.1 mmol/L    Chloride 105 95 - 110 mmol/L    CO2 23 23 - 29 mmol/L    Glucose 158 (H) 70 - 110 mg/dL    BUN, Bld 86 (H) 8 - 23 mg/dL    Creatinine 5.5 (H) 0.5 - 1.4 mg/dL    Calcium 6.9 (LL) 8.7 - 10.5 mg/dL    Total Protein 4.7 (L) 6.0 - 8.4 g/dL    Albumin 2.6 (L) 3.5 - 5.2 g/dL    Total Bilirubin 0.8 0.1 - 1.0 mg/dL    Alkaline Phosphatase 82 55 - 135 U/L    AST 17 10 - 40 U/L    ALT 45 (H) 10 - 44 U/L    Anion Gap 10 8 - 16 mmol/L    eGFR if African American 10.5 (A) >60 mL/min/1.73 m^2    eGFR if non  9.1 (A) >60 mL/min/1.73 m^2   Calcium, ionized    Collection Time: 03/27/19  2:50 PM   Result Value Ref Range    Calcium, Ion 0.88 (L) 1.06 - 1.42 mmol/L   POCT glucose    Collection Time: 03/27/19  5:13 PM   Result Value Ref Range    POCT Glucose 113 (H) 70 - 110 mg/dL   POCT glucose    Collection Time: 03/27/19  8:39 PM   Result Value Ref Range    POCT Glucose 116 (H) 70 - 110 mg/dL   Basic metabolic panel    Collection Time: 03/27/19  9:30 PM   Result Value Ref Range    Sodium 135 (L)  136 - 145 mmol/L    Potassium 3.4 (L) 3.5 - 5.1 mmol/L    Chloride 102 95 - 110 mmol/L    CO2 24 23 - 29 mmol/L    Glucose 103 70 - 110 mg/dL    BUN, Bld 53 (H) 8 - 23 mg/dL    Creatinine 3.5 (H) 0.5 - 1.4 mg/dL    Calcium 7.2 (L) 8.7 - 10.5 mg/dL    Anion Gap 9 8 - 16 mmol/L    eGFR if African American 18.1 (A) >60 mL/min/1.73 m^2    eGFR if non  15.7 (A) >60 mL/min/1.73 m^2   Magnesium    Collection Time: 03/27/19  9:30 PM   Result Value Ref Range    Magnesium 1.9 1.6 - 2.6 mg/dL   Basic metabolic panel    Collection Time: 03/28/19 12:33 AM   Result Value Ref Range    Sodium 140 136 - 145 mmol/L    Potassium 3.7 3.5 - 5.1 mmol/L    Chloride 105 95 - 110 mmol/L    CO2 27 23 - 29 mmol/L    Glucose 79 70 - 110 mg/dL    BUN, Bld 42 (H) 8 - 23 mg/dL    Creatinine 3.2 (H) 0.5 - 1.4 mg/dL    Calcium 7.6 (L) 8.7 - 10.5 mg/dL    Anion Gap 8 8 - 16 mmol/L    eGFR if African American 20.2 (A) >60 mL/min/1.73 m^2    eGFR if non African American 17.5 (A) >60 mL/min/1.73 m^2   Magnesium    Collection Time: 03/28/19 12:33 AM   Result Value Ref Range    Magnesium 1.7 1.6 - 2.6 mg/dL   Protime-INR    Collection Time: 03/28/19 12:33 AM   Result Value Ref Range    Prothrombin Time 11.2 9.0 - 12.5 sec    INR 1.1 0.8 - 1.2   CBC auto differential    Collection Time: 03/28/19 12:33 AM   Result Value Ref Range    WBC 7.25 3.90 - 12.70 K/uL    RBC 3.22 (L) 4.60 - 6.20 M/uL    Hemoglobin 10.3 (L) 14.0 - 18.0 g/dL    Hematocrit 29.9 (L) 40.0 - 54.0 %    MCV 93 82 - 98 fL    MCH 32.0 (H) 27.0 - 31.0 pg    MCHC 34.4 32.0 - 36.0 g/dL    RDW 15.4 (H) 11.5 - 14.5 %    Platelets 101 (L) 150 - 350 K/uL    MPV 10.3 9.2 - 12.9 fL    Immature Granulocytes 1.1 (H) 0.0 - 0.5 %    Gran # (ANC) 5.3 1.8 - 7.7 K/uL    Immature Grans (Abs) 0.08 (H) 0.00 - 0.04 K/uL    Lymph # 1.4 1.0 - 4.8 K/uL    Mono # 0.5 0.3 - 1.0 K/uL    Eos # 0.1 0.0 - 0.5 K/uL    Baso # 0.01 0.00 - 0.20 K/uL    nRBC 0 0 /100 WBC    Gran% 72.4 38.0 - 73.0 %    Lymph%  18.8 18.0 - 48.0 %    Mono% 6.5 4.0 - 15.0 %    Eosinophil% 1.1 0.0 - 8.0 %    Basophil% 0.1 0.0 - 1.9 %    Differential Method Automated    APTT    Collection Time: 03/28/19 12:33 AM   Result Value Ref Range    aPTT 59.7 (H) 21.0 - 32.0 sec   Comprehensive metabolic panel    Collection Time: 03/28/19  2:56 AM   Result Value Ref Range    Sodium 140 136 - 145 mmol/L    Potassium 3.8 3.5 - 5.1 mmol/L    Chloride 104 95 - 110 mmol/L    CO2 25 23 - 29 mmol/L    Glucose 83 70 - 110 mg/dL    BUN, Bld 45 (H) 8 - 23 mg/dL    Creatinine 3.7 (H) 0.5 - 1.4 mg/dL    Calcium 7.5 (L) 8.7 - 10.5 mg/dL    Total Protein 4.8 (L) 6.0 - 8.4 g/dL    Albumin 2.6 (L) 3.5 - 5.2 g/dL    Total Bilirubin 1.0 0.1 - 1.0 mg/dL    Alkaline Phosphatase 79 55 - 135 U/L    AST 15 10 - 40 U/L    ALT 37 10 - 44 U/L    Anion Gap 11 8 - 16 mmol/L    eGFR if African American 16.9 (A) >60 mL/min/1.73 m^2    eGFR if non  14.7 (A) >60 mL/min/1.73 m^2   Magnesium    Collection Time: 03/28/19  2:56 AM   Result Value Ref Range    Magnesium 1.5 (L) 1.6 - 2.6 mg/dL   Phosphorus    Collection Time: 03/28/19  2:56 AM   Result Value Ref Range    Phosphorus 3.6 2.7 - 4.5 mg/dL         Diagnostic Results     Brain Imaging     MRI 3/26/19  New acute left PCA territory distribution infarctions involving the left paramedian occipital lobe, parahippocampal region and left splenium of the corpus callosum with additional acute evolving involving infarctions of the left thalamus.  No evidence of superimposed hemorrhage or hydrocephalus.  2. Generalized cerebral volume loss and findings suggestive of significant chronic microvascular ischemic change.      Under vessel imaging  Vessel Imaging   VAS US GIOVANY LEGS  Report Summary:  Impression:   Right Leg:  Color flow evaluation of the lower extremity demonstrates fully compressible and patent veins. There is no evidence of venous thrombosis in the deep or superficial veins.  Incidental finding of significant  reflux noted in the CFV.    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 There is no evidence of venous thrombosis in the remaining deep veins.    VAS US Carotid Bilaterally  Report Summary:  Impression:   RIGHT SIDE:  Velocities are suggestive of a 40-59% right ICA stenosis; however, it  visually appears to be in the 60-79% range.   Calcification of the right internal carotid artery, which denies complete visualization.   Heterogeneous plaque in the right common carotid artery.   Antegrade flow in the right vertebral artery.   LEFT SIDE:  1 - 39% left ICA stenosis.   Calcification of the left internal carotid artery.   Heterogeneous plaque in the left common carotid artery.   Retrograde flow in the left vertebral artery.    MRI 3/24 Brain Ischemic protocol   Punctate posterior left thalamic acute infarction.  No mass effect or hemorrhage.    Focal decreased signal within the proximal left PCA, suggestive of high-grade stenosis.    Moderate to high-grade narrowing of the intra cavernous/ supraclinoid ICAs bilaterally.    High-grade stenosis distal left vertebral artery.    CTA STROKE MULTIPHASE 3/24  No acute intracranial hemorrhage or mass effect.  No ischemic changes appreciated on CT.    Extensive calcified atherosclerotic disease resulting:    Left PCA with a high-grade stenosis.    High-grade (>70%) stenosis right proximal ICA and moderate (50-70%) stenosis left proximal ICA.    Moderate to high-grade stenosis of the intra cavernous/ supraclinoid ICAs bilaterally.    High-grade stenosis distal left vertebral artery.    Cardiac Imaging   TRANSTHORACIC ECHO (TTE) COMPLETE 3/23/2019  · Eccentric left ventricular hypertrophy.Normal left ventricular systolic function. The estimated ejection fraction is 55%  · Severe left atrial enlargement.  · Indeterminate left ventricular diastolic function.  · Moderate aortic valve  stenosis. Aortic valve area is 1.37 cm2; peak velocity is 2.78 m/s; mean gradient is 22.39 mmHg. Mild aortic regurgitation.  · Mild-to-moderate mitral regurgitation.  · Mild to moderate tricuspid regurgitation.  · The estimated PA systolic pressure is 41 mm Hg  · Severe right atrial enlargement.  · Normal right ventricular systolic function. The right ventricle is mildly dilated.  · Patent foramen ovale present with left to right shunting indicated by color flow Doppler.  · Normal central venous pressure (3 mm Hg).  · Pulmonary hypertension present.        Thiago Pickard MD  Comprehensive Stroke Center  Department of Vascular Neurology   [facility]

## 2019-03-28 NOTE — PLAN OF CARE
Outcome: Ongoing (interventions implemented as appropriate)  POC reviewed with pt at 0400. Pt and family verbalized understanding. Heparin gtt continued and has remained therapeutic throughout shift. BUE restraints applied and started over night for patient climbing out of bed and removing medical devices. Questions and concerns addressed with patient's son and daughter. HD completed. Patient tolerated well. No acute events overnight. Pt progressing toward goals. Will continue to monitor. See flowsheets for full assessment and VS info

## 2019-03-28 NOTE — ASSESSMENT & PLAN NOTE
-- H/o ESRD on HD (MWF)  -- LUE AVF+. Ultrasound on 3/25 showed patency of the fistula site.  -- Nephrology following, appreciate recs.

## 2019-03-28 NOTE — PHYSICIAN QUERY
PT Name: Micheal Hunt  MR #: [id number]    Physician Query Form - Consultant Diagnosis Clarification     CDS/: Katty Cooley               Contact information:[email or web address]  This form is a permanent document in the medical record.     Query Date: March 28, 2019      By submitting this query, we are merely seeking further clarification of documentation.  Please utilize your independent clinical judgment when addressing the question(s) below.      The Medical record contains the following:   Diagnosis Supporting Clinical Information Location in Medical Record   COPD/asthma exacerbation, PNA            Patient is a 78 y.o. male admitted to Hospitalist Service from Dr. Guillen's office with complaint of persisting asthma exacerbation    Patient reportedly has past medical history significant for bronchial asthma, hypertension, end-stage renal disease (on &hemodialysis Monday/Wednesday/Friday), coronary artery disease, benign prostatic hypertrophy, gastroesophageal reflux disease, history of gout hyperlipidemia, obstructive sleep apnea (not on any CPAP).     Never Smoker         pt seems better already. Needs to have f/u immune deficiency. Asthma control is vague. Check procalcitonin. Steroids,abx, bd, needs ig eval and humoral antibody titers outpt   Nephro CN 3/11        H&P 3/11                                  Pulm CN 3/11         Do you agree with the Consultants diagnosis of ___COPD Exacerbation_____?    [  ] Yes   [  ] Yes, but it resolved prior to my assessment of the patient   [ n ] No   [  ] Clinically insignificant   [  ] Other/Clarification of findings:   [  ] Clinically undetermined

## 2019-03-28 NOTE — ASSESSMENT & PLAN NOTE
-- H/o ESRD on HD (MWF)  -- LUE AVF+. Ultrasound on 3/25 showed patency of the fistula site.  -- Nephrology following, appreciate recs.  -- Noted to have mild oozing from fistula site on 3/28. No tenderness/swelling. Continuing heparin gtt for now.

## 2019-03-28 NOTE — PT/OT/SLP PROGRESS
Physical Therapy      Patient Name:  Micheal Hunt   MRN:  [id number]    Patient not evaluated today secondary to Other (Comment)(RN hold due to neurological status change, patient with cognitive changes and increased lethargy, RN and MD at bedside, patient going for stat imaging). Will follow-up 3/29/2019 as medically appropriate.    Salome Brennan, PT

## 2019-03-28 NOTE — PT/OT/SLP PROGRESS
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    Recommendations:                 General Recommendations:  Dysphagia therapy, Speech/language therapy and Cognitive-linguistic therapy  Diet recommendations:  Regular, Liquid Diet Level: Nectar Thick   Aspiration Precautions: Assistance with meals and Assistance with thickening liquids, Eliminate distractions, Feed only when awake/alert, HOB to 90 degrees, Meds crushed in puree, Monitor for s/s of aspiration, No straws, Small bites/sips and Strict aspiration precautions   General Precautions: Standard, aphasia, fall  Communication strategies:  provide increased time to answer    Subjective     "Better"  Patient goals: did not state    Pain/Comfort:  Pain Rating 1: 0/10  Pain Rating Post-Intervention 1: 0/10    Objective:     Has the patient been evaluated by SLP for swallowing?   Yes  Keep patient NPO? No   Current Respiratory Status: room air      Nursing called and asked for speech to re-eval pt's swallowing due to decline in status. Pt had finished breakfast prior to entering the room. Family reported that pt tolerated his breakfast and denied coughing or choking. Daughter reported when pt is allowed to self feed, he tends to eat too fast and she did report some coughing. Family and nursing also reported pt with decreased speech when sitting upright and overall decline over last few days. Pt was seated upright with increased dysarthria noted. Pt was awake. Pt unable to produce a volitional cough when cued despite multiple attempts.  Pt given thin liquids via a cup. Pt with no overt coughing or choking but wet vocal quality noted on 50% of thin liquids swallows. Pt noted to self feed large sips when noted cued to take smaller sips. Pt tolerated pudding without difficulty. He also tolerated [place] toast. Discussed changing pt's diet to dental " soft but family reported that are always present and can cup up food for him. Reviewed use of thickener and swallowing precautions. Family expressed understanding and white board updated. Pt was oriented to self and month. However, at end of session he was not oriented to month and poor recall of place and year.  Speech strategies were reviewed but pt unable to use overarticulation to improve intelligibility.     Assessment:     Micheal Hunt is a 78 y.o. male with an SLP diagnosis of Dysphagia, Dysarthria and Cognitive-Linguistic Impairment.  He presents with decline in status.    Goals:   Multidisciplinary Problems     SLP Goals        Problem: SLP Goal    Goal Priority Disciplines Outcome   SLP Goal     SLP Ongoing (interventions implemented as appropriate)   Description:  Goals to be met 4/1  1 pt will tolerate regular diet and thin liquids/met  2 pt will participate in speech lang eval/met  3.  [place] to time and place  4.  Respond to categorization tasks with 80% accuracy  5.  Assess functional reading and writing skills  6.  Respond to problem solving tasks with 80% accuracy                       Plan:     · Patient to be seen:  5 x/week   · Plan of Care expires:  04/24/19  · Plan of Care reviewed with:  patient, family   · SLP Follow-Up:  Yes       Discharge recommendations:  rehabilitation facility       Time Tracking:     SLP Treatment Date:   03/28/19  Speech Start Time:  0831  Speech Stop Time:  0854     Speech Total Time (min):  23 min    Billable Minutes: Speech Therapy Individual 10 and Treatment Swallowing Dysfunction 13    KRISTIN Taveras, CCC-SLP  03/28/2019

## 2019-03-29 ENCOUNTER — CLINICAL SUPPORT (OUTPATIENT)
Dept: CARDIOLOGY | Facility: CLINIC | Age: 79
DRG: 280 | End: 2019-03-29
Attending: HOSPITALIST
Payer: MEDICARE

## 2019-03-29 PROBLEM — S30.1XXA GROIN HEMATOMA: Status: ACTIVE | Noted: 2019-03-29

## 2019-03-29 LAB
ALBUMIN SERPL BCP-MCNC: 2.5 G/DL (ref 3.5–5.2)
ALBUMIN SERPL BCP-MCNC: 2.5 G/DL (ref 3.5–5.2)
ALP SERPL-CCNC: 80 U/L (ref 55–135)
ALP SERPL-CCNC: 80 U/L (ref 55–135)
ALT SERPL W/O P-5'-P-CCNC: 29 U/L (ref 10–44)
ALT SERPL W/O P-5'-P-CCNC: 29 U/L (ref 10–44)
ANION GAP SERPL CALC-SCNC: 11 MMOL/L (ref 8–16)
ANION GAP SERPL CALC-SCNC: 11 MMOL/L (ref 8–16)
APTT BLDCRRT: 55.5 SEC (ref 21–32)
APTT BLDCRRT: 56.9 SEC (ref 21–32)
APTT BLDCRRT: 63.2 SEC (ref 21–32)
AST SERPL-CCNC: 13 U/L (ref 10–40)
AST SERPL-CCNC: 13 U/L (ref 10–40)
BASOPHILS # BLD AUTO: 0 K/UL (ref 0–0.2)
BASOPHILS # BLD AUTO: 0.01 K/UL (ref 0–0.2)
BASOPHILS NFR BLD: 0 % (ref 0–1.9)
BASOPHILS NFR BLD: 0.1 % (ref 0–1.9)
BASOPHILS NFR BLD: 0.2 % (ref 0–1.9)
BILIRUB SERPL-MCNC: 0.9 MG/DL (ref 0.1–1)
BILIRUB SERPL-MCNC: 0.9 MG/DL (ref 0.1–1)
BUN SERPL-MCNC: 56 MG/DL (ref 8–23)
BUN SERPL-MCNC: 56 MG/DL (ref 8–23)
CALCIUM SERPL-MCNC: 7.1 MG/DL (ref 8.7–10.5)
CALCIUM SERPL-MCNC: 7.1 MG/DL (ref 8.7–10.5)
CHLORIDE SERPL-SCNC: 106 MMOL/L (ref 95–110)
CHLORIDE SERPL-SCNC: 106 MMOL/L (ref 95–110)
CO2 SERPL-SCNC: 23 MMOL/L (ref 23–29)
CO2 SERPL-SCNC: 23 MMOL/L (ref 23–29)
CREAT SERPL-MCNC: 5.1 MG/DL (ref 0.5–1.4)
CREAT SERPL-MCNC: 5.1 MG/DL (ref 0.5–1.4)
DIFFERENTIAL METHOD: ABNORMAL
EOSINOPHIL # BLD AUTO: 0.1 K/UL (ref 0–0.5)
EOSINOPHIL NFR BLD: 0.8 % (ref 0–8)
EOSINOPHIL NFR BLD: 1 % (ref 0–8)
EOSINOPHIL NFR BLD: 1.1 % (ref 0–8)
ERYTHROCYTE [DISTWIDTH] IN BLOOD BY AUTOMATED COUNT: 15.2 % (ref 11.5–14.5)
ERYTHROCYTE [DISTWIDTH] IN BLOOD BY AUTOMATED COUNT: 15.2 % (ref 11.5–14.5)
ERYTHROCYTE [DISTWIDTH] IN BLOOD BY AUTOMATED COUNT: 15.4 % (ref 11.5–14.5)
EST. GFR  (AFRICAN AMERICAN): 11.5 ML/MIN/1.73 M^2
EST. GFR  (AFRICAN AMERICAN): 11.5 ML/MIN/1.73 M^2
EST. GFR  (NON AFRICAN AMERICAN): 9.9 ML/MIN/1.73 M^2
EST. GFR  (NON AFRICAN AMERICAN): 9.9 ML/MIN/1.73 M^2
GLUCOSE SERPL-MCNC: 100 MG/DL (ref 70–110)
GLUCOSE SERPL-MCNC: 100 MG/DL (ref 70–110)
HCT VFR BLD AUTO: 23.4 % (ref 40–54)
HCT VFR BLD AUTO: 24.6 % (ref 40–54)
HCT VFR BLD AUTO: 26.2 % (ref 40–54)
HCT VFR BLD AUTO: 29.4 % (ref 40–54)
HGB BLD-MCNC: 7.9 G/DL (ref 14–18)
HGB BLD-MCNC: 8.1 G/DL (ref 14–18)
HGB BLD-MCNC: 8.8 G/DL (ref 14–18)
HGB BLD-MCNC: 9.6 G/DL (ref 14–18)
IMM GRANULOCYTES # BLD AUTO: 0.04 K/UL (ref 0–0.04)
IMM GRANULOCYTES # BLD AUTO: 0.05 K/UL (ref 0–0.04)
IMM GRANULOCYTES # BLD AUTO: 0.06 K/UL (ref 0–0.04)
IMM GRANULOCYTES # BLD AUTO: 0.06 K/UL (ref 0–0.04)
IMM GRANULOCYTES NFR BLD AUTO: 0.6 % (ref 0–0.5)
IMM GRANULOCYTES NFR BLD AUTO: 0.7 % (ref 0–0.5)
IMM GRANULOCYTES NFR BLD AUTO: 0.9 % (ref 0–0.5)
INR PPP: 1.1 (ref 0.8–1.2)
INR PPP: 1.1 (ref 0.8–1.2)
LYMPHOCYTES # BLD AUTO: 1.3 K/UL (ref 1–4.8)
LYMPHOCYTES # BLD AUTO: 1.5 K/UL (ref 1–4.8)
LYMPHOCYTES NFR BLD: 18.9 % (ref 18–48)
LYMPHOCYTES NFR BLD: 19.4 % (ref 18–48)
LYMPHOCYTES NFR BLD: 19.5 % (ref 18–48)
LYMPHOCYTES NFR BLD: 20.5 % (ref 18–48)
MAGNESIUM SERPL-MCNC: 2.2 MG/DL (ref 1.6–2.6)
MAGNESIUM SERPL-MCNC: 2.2 MG/DL (ref 1.6–2.6)
MCH RBC QN AUTO: 31.6 PG (ref 27–31)
MCH RBC QN AUTO: 32.4 PG (ref 27–31)
MCH RBC QN AUTO: 32.5 PG (ref 27–31)
MCH RBC QN AUTO: 32.8 PG (ref 27–31)
MCHC RBC AUTO-ENTMCNC: 32.7 G/DL (ref 32–36)
MCHC RBC AUTO-ENTMCNC: 32.9 G/DL (ref 32–36)
MCHC RBC AUTO-ENTMCNC: 33.6 G/DL (ref 32–36)
MCHC RBC AUTO-ENTMCNC: 33.8 G/DL (ref 32–36)
MCV RBC AUTO: 97 FL (ref 82–98)
MCV RBC AUTO: 98 FL (ref 82–98)
MONOCYTES # BLD AUTO: 0.4 K/UL (ref 0.3–1)
MONOCYTES # BLD AUTO: 0.6 K/UL (ref 0.3–1)
MONOCYTES NFR BLD: 6 % (ref 4–15)
MONOCYTES NFR BLD: 6.3 % (ref 4–15)
MONOCYTES NFR BLD: 6.7 % (ref 4–15)
MONOCYTES NFR BLD: 7 % (ref 4–15)
NEUTROPHILS # BLD AUTO: 4.5 K/UL (ref 1.8–7.7)
NEUTROPHILS # BLD AUTO: 4.7 K/UL (ref 1.8–7.7)
NEUTROPHILS # BLD AUTO: 5 K/UL (ref 1.8–7.7)
NEUTROPHILS # BLD AUTO: 5.8 K/UL (ref 1.8–7.7)
NEUTROPHILS NFR BLD: 71.1 % (ref 38–73)
NEUTROPHILS NFR BLD: 72.3 % (ref 38–73)
NEUTROPHILS NFR BLD: 72.4 % (ref 38–73)
NEUTROPHILS NFR BLD: 72.7 % (ref 38–73)
NRBC BLD-RTO: 0 /100 WBC
PHOSPHATE SERPL-MCNC: 5 MG/DL (ref 2.7–4.5)
PHOSPHATE SERPL-MCNC: 5 MG/DL (ref 2.7–4.5)
PLATELET # BLD AUTO: 82 K/UL (ref 150–350)
PLATELET # BLD AUTO: 85 K/UL (ref 150–350)
PLATELET # BLD AUTO: 85 K/UL (ref 150–350)
PLATELET # BLD AUTO: 86 K/UL (ref 150–350)
PMV BLD AUTO: 10.3 FL (ref 9.2–12.9)
PMV BLD AUTO: 10.8 FL (ref 9.2–12.9)
PMV BLD AUTO: 11 FL (ref 9.2–12.9)
PMV BLD AUTO: 11 FL (ref 9.2–12.9)
POCT GLUCOSE: 116 MG/DL (ref 70–110)
POCT GLUCOSE: 131 MG/DL (ref 70–110)
POCT GLUCOSE: 146 MG/DL (ref 70–110)
POCT GLUCOSE: 173 MG/DL (ref 70–110)
POCT GLUCOSE: 197 MG/DL (ref 70–110)
POTASSIUM SERPL-SCNC: 4.2 MMOL/L (ref 3.5–5.1)
POTASSIUM SERPL-SCNC: 4.2 MMOL/L (ref 3.5–5.1)
PROT SERPL-MCNC: 4.5 G/DL (ref 6–8.4)
PROT SERPL-MCNC: 4.5 G/DL (ref 6–8.4)
PROTHROMBIN TIME: 11.2 SEC (ref 9–12.5)
PROTHROMBIN TIME: 11.2 SEC (ref 9–12.5)
RBC # BLD AUTO: 2.41 M/UL (ref 4.6–6.2)
RBC # BLD AUTO: 2.5 M/UL (ref 4.6–6.2)
RBC # BLD AUTO: 2.71 M/UL (ref 4.6–6.2)
RBC # BLD AUTO: 3.04 M/UL (ref 4.6–6.2)
SODIUM SERPL-SCNC: 140 MMOL/L (ref 136–145)
SODIUM SERPL-SCNC: 140 MMOL/L (ref 136–145)
UPPER ARTERIAL RIGHT ARM AXILLARY SYS MAX: 47 CM/S
UPPER ARTERIAL RIGHT ARM BRACHIAL SYS MAX: 100 CM/S
UPPER ARTERIAL RIGHT ARM RADIAL SYS MAX: 86 CM/S
UPPER ARTERIAL RIGHT ARM SUBCLAVIAN SYS MAX: 74 CM/S
UPPER ARTERIAL RIGHT ARM ULNAR SYS MAX: 52 CM/S
WBC # BLD AUTO: 6.39 K/UL (ref 3.9–12.7)
WBC # BLD AUTO: 6.49 K/UL (ref 3.9–12.7)
WBC # BLD AUTO: 6.88 K/UL (ref 3.9–12.7)
WBC # BLD AUTO: 8.05 K/UL (ref 3.9–12.7)

## 2019-03-29 PROCEDURE — 94761 N-INVAS EAR/PLS OXIMETRY MLT: CPT

## 2019-03-29 PROCEDURE — 99233 PR SUBSEQUENT HOSPITAL CARE,LEVL III: ICD-10-PCS | Mod: ,,, | Performed by: INTERNAL MEDICINE

## 2019-03-29 PROCEDURE — 63600175 PHARM REV CODE 636 W HCPCS: Performed by: UROLOGY

## 2019-03-29 PROCEDURE — 99233 PR SUBSEQUENT HOSPITAL CARE,LEVL III: ICD-10-PCS | Mod: GC,,, | Performed by: PSYCHIATRY & NEUROLOGY

## 2019-03-29 PROCEDURE — 85730 THROMBOPLASTIN TIME PARTIAL: CPT

## 2019-03-29 PROCEDURE — 25000003 PHARM REV CODE 250: Performed by: STUDENT IN AN ORGANIZED HEALTH CARE EDUCATION/TRAINING PROGRAM

## 2019-03-29 PROCEDURE — 84100 ASSAY OF PHOSPHORUS: CPT

## 2019-03-29 PROCEDURE — 92507 TX SP LANG VOICE COMM INDIV: CPT

## 2019-03-29 PROCEDURE — 85730 THROMBOPLASTIN TIME PARTIAL: CPT | Mod: 91

## 2019-03-29 PROCEDURE — 36410 VNPNXR 3YR/> PHY/QHP DX/THER: CPT

## 2019-03-29 PROCEDURE — 85610 PROTHROMBIN TIME: CPT

## 2019-03-29 PROCEDURE — 92526 ORAL FUNCTION THERAPY: CPT

## 2019-03-29 PROCEDURE — 93931 CV US DOPPLER ARTERIAL ARM RIGHT (CUPID ONLY): ICD-10-PCS | Mod: 26,RT,, | Performed by: INTERNAL MEDICINE

## 2019-03-29 PROCEDURE — 99233 SBSQ HOSP IP/OBS HIGH 50: CPT | Mod: GC,,, | Performed by: PSYCHIATRY & NEUROLOGY

## 2019-03-29 PROCEDURE — C1751 CATH, INF, PER/CENT/MIDLINE: HCPCS

## 2019-03-29 PROCEDURE — 97164 PT RE-EVAL EST PLAN CARE: CPT

## 2019-03-29 PROCEDURE — 25000003 PHARM REV CODE 250: Performed by: INTERNAL MEDICINE

## 2019-03-29 PROCEDURE — 85025 COMPLETE CBC W/AUTO DIFF WBC: CPT | Mod: 91

## 2019-03-29 PROCEDURE — 25000003 PHARM REV CODE 250: Performed by: HOSPITALIST

## 2019-03-29 PROCEDURE — 83735 ASSAY OF MAGNESIUM: CPT

## 2019-03-29 PROCEDURE — 80053 COMPREHEN METABOLIC PANEL: CPT

## 2019-03-29 PROCEDURE — 97530 THERAPEUTIC ACTIVITIES: CPT

## 2019-03-29 PROCEDURE — 93931 UPPER EXTREMITY STUDY: CPT | Mod: RT

## 2019-03-29 PROCEDURE — 20000000 HC ICU ROOM

## 2019-03-29 PROCEDURE — 99233 SBSQ HOSP IP/OBS HIGH 50: CPT | Mod: ,,, | Performed by: INTERNAL MEDICINE

## 2019-03-29 PROCEDURE — 76937 US GUIDE VASCULAR ACCESS: CPT

## 2019-03-29 PROCEDURE — 93931 UPPER EXTREMITY STUDY: CPT | Mod: 26,RT,, | Performed by: INTERNAL MEDICINE

## 2019-03-29 RX ORDER — SODIUM CHLORIDE 9 MG/ML
INJECTION, SOLUTION INTRAVENOUS
Status: DISCONTINUED | OUTPATIENT
Start: 2019-03-29 | End: 2019-04-07

## 2019-03-29 RX ORDER — SODIUM CHLORIDE 9 MG/ML
INJECTION, SOLUTION INTRAVENOUS ONCE
Status: COMPLETED | OUTPATIENT
Start: 2019-03-29 | End: 2019-04-05

## 2019-03-29 RX ORDER — CARVEDILOL 25 MG/1
25 TABLET ORAL 2 TIMES DAILY WITH MEALS
Status: DISCONTINUED | OUTPATIENT
Start: 2019-03-29 | End: 2019-04-09 | Stop reason: HOSPADM

## 2019-03-29 RX ADMIN — HEPARIN SODIUM AND DEXTROSE 14 UNITS/KG/HR: 10000; 5 INJECTION INTRAVENOUS at 08:03

## 2019-03-29 RX ADMIN — TORSEMIDE 20 MG: 20 TABLET ORAL at 09:03

## 2019-03-29 RX ADMIN — INSULIN ASPART 2 UNITS: 100 INJECTION, SOLUTION INTRAVENOUS; SUBCUTANEOUS at 12:03

## 2019-03-29 RX ADMIN — PANTOPRAZOLE SODIUM 40 MG: 40 TABLET, DELAYED RELEASE ORAL at 09:03

## 2019-03-29 RX ADMIN — WARFARIN SODIUM 5 MG: 5 TABLET ORAL at 04:03

## 2019-03-29 RX ADMIN — TORSEMIDE 20 MG: 20 TABLET ORAL at 08:03

## 2019-03-29 RX ADMIN — CARVEDILOL 50 MG: 25 TABLET, FILM COATED ORAL at 07:03

## 2019-03-29 RX ADMIN — STANDARDIZED SENNA CONCENTRATE AND DOCUSATE SODIUM 1 TABLET: 8.6; 5 TABLET, FILM COATED ORAL at 08:03

## 2019-03-29 RX ADMIN — LOSARTAN POTASSIUM 100 MG: 50 TABLET, FILM COATED ORAL at 08:03

## 2019-03-29 RX ADMIN — ALLOPURINOL 100 MG: 100 TABLET ORAL at 09:03

## 2019-03-29 RX ADMIN — ATORVASTATIN CALCIUM 80 MG: 20 TABLET, FILM COATED ORAL at 08:03

## 2019-03-29 RX ADMIN — GABAPENTIN 300 MG: 300 CAPSULE ORAL at 08:03

## 2019-03-29 RX ADMIN — INSULIN ASPART 2 UNITS: 100 INJECTION, SOLUTION INTRAVENOUS; SUBCUTANEOUS at 04:03

## 2019-03-29 RX ADMIN — CARVEDILOL 25 MG: 25 TABLET, FILM COATED ORAL at 04:03

## 2019-03-29 RX ADMIN — TAMSULOSIN HYDROCHLORIDE 0.4 MG: 0.4 CAPSULE ORAL at 09:03

## 2019-03-29 RX ADMIN — FINASTERIDE 5 MG: 5 TABLET, FILM COATED ORAL at 09:03

## 2019-03-29 RX ADMIN — STANDARDIZED SENNA CONCENTRATE AND DOCUSATE SODIUM 1 TABLET: 8.6; 5 TABLET, FILM COATED ORAL at 09:03

## 2019-03-29 RX ADMIN — INSULIN DETEMIR 8 UNITS: 100 INJECTION, SOLUTION SUBCUTANEOUS at 10:03

## 2019-03-29 NOTE — PLAN OF CARE
Problem: Physical Therapy Goal  Goal: Physical Therapy Goal  Goals to be met by: 2019    Patient will increase functional independence with mobility by performin. Supine to sit with moderate assistance  2. Sit to supine with moderate Assistance  3. Sit to stand transfer with Maximum Assistance  4. Gait  x 2 feet with Maximum Assistance using least restrictive device  5. Sitting at edge of bed x15 minutes with Contact Guard Assistance  6. Lower extremity exercise program x20 reps per handout, with assistance as needed    Outcome: Ongoing (interventions implemented as appropriate)  Evaluation completed, initiated plan of care.   Salome Brennan, PT  3/29/2019

## 2019-03-29 NOTE — SUBJECTIVE & OBJECTIVE
Past Medical History:   Diagnosis Date    NICK (acute kidney injury) 7/29/2016    Allergy     Anemia, mild 12/15/2014    Arthritis     Gout    Benign essential HTN 3/27/2012    BMI 29.0-29.9,adult 5/10/2018    BPH (benign prostatic hyperplasia)     BPH (benign prostatic hyperplasia)     CAD (coronary artery disease) 2006    Chronic kidney disease     due to ibuprofen    Colon polyp     CRF (chronic renal failure), stage 5     Diverticulosis     Gastritis     GERD (gastroesophageal reflux disease)     Gout     History of colon polyps 5/3/2018    HTN (hypertension) 3/27/2012    Hyperlipidemia     Hyperlipidemia     LLL pneumonia 6/14/2018    LVH (left ventricular hypertrophy)     Mesenteric ischemia     Murmur, cardiac 3/27/2012    GRICELDA (obstructive sleep apnea)     DOES NOT USE A MACHINE    Sinus problem     Syncope and collapse        Past Surgical History:   Procedure Laterality Date    APPENDECTOMY      BLOCK-NERVE Left 5/31/2016    [facility]    CARDIAC CATHETERIZATION      COLONOSCOPY  2011    COLONOSCOPY N/A 5/3/2018    [facility]    COLONOSCOPY N/A 9/10/2015    [facility]    CORONARY ARTERY BYPASS GRAFT  4/2007    x 1    CYSTOSCOPY N/A 8/30/2017    [facility]    CYSTOSCOPY N/A 11/10/2015    [facility]    ESOPHAGOGASTRODUODENOSCOPY (EGD) N/A 1/4/2016    [facility] Tra [facility] ENDO (2ND FLR)    ESOPHAGOGASTRODUODENOSCOPY (EGD) N/A 10/15/2014    [facility]    INJECTION-STEROID-EPIDURAL-TRANSFORAMINAL Left 2/25/2016    [facility]    JOINT REPLACEMENT      left knee total replacement  X 3    mid leftt finger      from a cactuss    MOLE REMOVAL  2016    RADIOFREQUENCY THERMOCOAGULATION (RFTC)-NERVE-MEDIAN BRANCH-LUMBAR Left  4/11/2016    [facility]    rotative cuff      no rotative cuffs on bilat shoulders has pins     SHOULDER SURGERY      shoulder surgery bilat  RIGHT X 4; LEFT X 3    TRANSRECTAL ULTRASOUND GUIDED PROSTATE BIOPSY Bilateral 11/10/2015    [facility]    ULTRASOUND-ENDOSCOPIC-UPPER N/A 5/31/2017    [facility] (2ND FLR)    ULTRASOUND-ENDOSCOPIC-UPPER N/A 1/4/2016    [facility] ENDO (2ND FLR)    ULTRASOUND-ENDOSCOPIC-UPPER N/A 1/16/2015    Performed by Jason Saleem [facility] at [facility] ENDO (2ND FLR)       Review of patient's allergies indicates:   Allergen Reactions    Ace inhibitors Other (See Comments)     Cough    Arb-angiotensin receptor antagonist Itching    Eplerenone Other (See Comments)     Marked bradycardia, 40, tiredness and weakness      Sulfa (sulfonamide antibiotics) Itching     Patient says this was 10 years ago and doesn't remember what happened       Current Facility-Administered Medications   Medication    0.9%  NaCl infusion    0.9%  NaCl infusion    acetaminophen tablet 650 mg    albuterol-ipratropium 2.5 mg-0.5 mg/3 mL nebulizer solution 3 mL    allopurinol tablet 100 mg    aspirin EC tablet 81 mg    atorvastatin tablet 80 mg    carvedilol tablet 50 mg    dextrose 50% injection 12.5 g    dextrose 50% injection 25 g    finasteride tablet 5 mg    gabapentin capsule 300 mg    glucagon (human recombinant) injection 1 mg    glucose chewable tablet 16 g    glucose chewable tablet 24 g    heparin 25,000 units in dextrose 5% (100 units/ml) IV bolus from bag - ADDITIONAL PRN BOLUS - 30 units/kg (max bolus 4000 units)    heparin 25,000 units in dextrose 5% (100 units/ml) IV bolus from bag - ADDITIONAL PRN BOLUS - 60 units/kg (max bolus 4000 units)    heparin 25,000 units in dextrose 5% 250 mL (100 units/mL) infusion LOW INTENSITY nomogram - [facility]    insulin aspart U-100  "pen 1-10 Units    insulin detemir U-100 pen 8 Units    losartan tablet 100 mg    nitroGLYCERIN SL tablet 0.4 mg    ondansetron tablet 8 mg    pantoprazole EC tablet 40 mg    polyethylene glycol packet 17 g    senna-docusate 8.6-50 mg per tablet 1 tablet    tamsulosin 24 hr capsule 0.4 mg    torsemide tablet 20 mg    warfarin (COUMADIN) tablet 5 mg     Family History     Problem Relation (Age of Onset)    Cancer Father    Heart disease Mother, Sister    Hypertension Brother    Pneumonia Sister    Stroke Sister    Sudden death Father        Tobacco Use    Smoking status: Never Smoker    Smokeless tobacco: Never Used   Substance and Sexual Activity    Alcohol use: No    Drug use: No    Sexual activity: Not on file     ROS   Constitutional: negative for fevers  Eyes: no visual changes  ENT: negative for hearing loss  Respiratory: negative for dyspnea  Cardiovascular: negative for chest pain  Gastrointestinal: negative for abdominal pain  Genitourinary: negative for dysuria  Neurological: negative for headaches  Behavioral/Psych: negative for hallucinations  Endocrine: negative for temperature intolerance    Objective:     Vital Signs (Most Recent):  Temp: 98.6 °F (37 °C) (03/28/19 1500)  Pulse: 67 (03/28/19 1957)  Resp: 15 (03/28/19 1957)  BP: (!) 163/71 (03/28/19 1800)  SpO2: 97 % (03/28/19 1957) Vital Signs (24h Range):  Temp:  [98 °F (36.7 °C)-98.8 °F (37.1 °C)] 98.6 °F (37 °C)  Pulse:  [64-90] 67  Resp:  [11-31] 15  SpO2:  [95 %-100 %] 97 %  BP: (126-185)/(59-76) 163/71  Arterial Line BP: (117-180)/(59-84) 161/74     Weight: 106.6 kg (235 lb 0.2 oz)  Height: 6' 1" (185.4 cm)  Body mass index is 31.01 kg/m².      Intake/Output Summary (Last 24 hours) at 3/28/2019 2001  Last data filed at 3/28/2019 1800  Gross per 24 hour   Intake 1926.93 ml   Output 1866 ml   Net 60.93 ml       Ortho/SPM Exam  Physical Exam:  Gen:  No acute distress  CV:  Peripherally well-perfused.  Pulses 2+ bilaterally.  Lungs:  " Normal respiratory effort.  Abdomen:  Soft, non-tender, non-distended  Head/Neck:  Normocephalic.  Atraumatic. No TTP, AROM and PROM intact without pain  Neuro:  CN intact without deficit, SILT throughout B/L Upper & Lower Extremities  Pelvis: No TTP, Stable to direct anterior pressure over ASIS.    RIGHT UPPER EXTREMITY:     INSPECTION  - Skin intact, diffuse swelling to right forearm and hand. Peripheral IV in AC fossa.   PALPATION  - Compartments in hand in forearm all soft and compressible during two separate evaluations.   RANGE OF MOTION  - No pain with passive stretch.   NEUROVASCULAR  - AIN/PIN/Radial/Median/Ulnar Nerves assessed in isolation without deficit  - SILT throughout  - Radial & Ulnar arteries palpated 2+  - Capillary Refill <3s      Significant Labs: All pertinent labs within the past 24 hours have been reviewed.    Significant Imaging: I have reviewed and interpreted all pertinent imaging results/findings.

## 2019-03-29 NOTE — ASSESSMENT & PLAN NOTE
Micheal Dave Hunt is a 78 y.o. male with extravasation from arterial line in RUE. Evaluated on two separate occasion four hours apart with no worsening of swelling. All compartments soft and compressible.     - No concern for compartment syndrome. Keep arm iced and elevated to decrease swelling. Please call if swelling worsens.

## 2019-03-29 NOTE — ASSESSMENT & PLAN NOTE
-- H/o ESRD on HD (MWF)  -- LUE AVF+. Ultrasound on 3/25 showed patency of the fistula site.  -- Nephrology following, appreciate recs.  -- Noted to have mild oozing from fistula site on 3/28. No tenderness/swelling. H/h stable. Resolved as of 3/29.

## 2019-03-29 NOTE — SUBJECTIVE & OBJECTIVE
Interval History:   Patient evaluated at bedside, no significant event overnight.     Review of patient's allergies indicates:   Allergen Reactions    Ace inhibitors Other (See Comments)     Cough    Arb-angiotensin receptor antagonist Itching    Eplerenone Other (See Comments)     Marked bradycardia, 40, tiredness and weakness      Sulfa (sulfonamide antibiotics) Itching     Patient says this was 10 years ago and doesn't remember what happened     Current Facility-Administered Medications   Medication Frequency    0.9%  NaCl infusion PRN    0.9%  NaCl infusion Once    acetaminophen tablet 650 mg Q6H PRN    albuterol-ipratropium 2.5 mg-0.5 mg/3 mL nebulizer solution 3 mL Q4H PRN    allopurinol tablet 100 mg Daily    atorvastatin tablet 80 mg Daily    carvedilol tablet 50 mg BID WM    dextrose 50% injection 12.5 g PRN    dextrose 50% injection 25 g PRN    finasteride tablet 5 mg Daily    gabapentin capsule 300 mg QHS    glucagon (human recombinant) injection 1 mg PRN    glucose chewable tablet 16 g PRN    glucose chewable tablet 24 g PRN    heparin 25,000 units in dextrose 5% (100 units/ml) IV bolus from bag - ADDITIONAL PRN BOLUS - 30 units/kg (max bolus 4000 units) PRN    heparin 25,000 units in dextrose 5% (100 units/ml) IV bolus from bag - ADDITIONAL PRN BOLUS - 60 units/kg (max bolus 4000 units) PRN    heparin 25,000 units in dextrose 5% 250 mL (100 units/mL) infusion LOW INTENSITY nomogram - [facility] Continuous    insulin aspart U-100 pen 1-10 Units QID (AC + HS) PRN    insulin detemir U-100 pen 8 Units Daily    losartan tablet 100 mg QHS    nitroGLYCERIN SL tablet 0.4 mg Q5 Min PRN    ondansetron tablet 8 mg Q6H PRN    pantoprazole EC tablet 40 mg Daily    polyethylene glycol packet 17 g Daily    senna-docusate 8.6-50 mg per tablet 1 tablet BID    tamsulosin 24 hr capsule 0.4 mg Daily    torsemide tablet 20 mg BID    warfarin (COUMADIN) tablet 5 mg Daily       Objective:     Vital  Signs (Most Recent):  Temp: 98.8 °F (37.1 °C) (03/29/19 0300)  Pulse: 66 (03/29/19 0800)  Resp: (!) 9 (03/29/19 0800)  BP: (!) 169/78 (03/29/19 0701)  SpO2: 97 % (03/29/19 0800)  O2 Device (Oxygen Therapy): room air (03/29/19 0600) Vital Signs (24h Range):  Temp:  [98.2 °F (36.8 °C)-98.8 °F (37.1 °C)] 98.8 °F (37.1 °C)  Pulse:  [64-79] 66  Resp:  [9-25] 9  SpO2:  [93 %-100 %] 97 %  BP: (126-185)/(59-85) 169/78     Weight: 106.6 kg (235 lb 0.2 oz) (03/24/19 0600)  Body mass index is 31.01 kg/m².  Body surface area is 2.34 meters squared.    I/O last 3 completed shifts:  In: 2101.2 [P.O.:50; I.V.:551.2; Other:1500]  Out: 1866 [Urine:305; Other:1561]    Physical Exam   Constitutional: No distress.   HENT:   Head: Normocephalic.   Right Ear: External ear normal.   Eyes: Right eye exhibits no discharge. Left eye exhibits no discharge.   Cardiovascular: An irregular rhythm present.   Pulmonary/Chest: Effort normal. No respiratory distress.   Abdominal: Soft.   Neurological: He is alert.   Skin: Skin is warm.       Significant Labs:  ABGs:   Recent Labs   Lab 03/24/19  1419   PH 7.397   PCO2 31.7*   HCO3 19.5*   POCSATURATED 97   BE -5     BMP:   Recent Labs   Lab 03/29/19  0142     100     106   CO2 23  23   BUN 56*  56*   CREATININE 5.1*  5.1*   CALCIUM 7.1*  7.1*   MG 2.2  2.2     CBC:   Recent Labs   Lab 03/29/19  0142   WBC 6.88  6.88  6.88   RBC 2.71*  2.71*  2.71*   HGB 8.8*  8.8*  8.8*   HCT 26.2*  26.2*  26.2*   PLT 86*  86*  86*   MCV 97  97  97   MCH 32.5*  32.5*  32.5*   MCHC 33.6  33.6  33.6     CMP:   Recent Labs   Lab 03/29/19  0142     100   CALCIUM 7.1*  7.1*   ALBUMIN 2.5*  2.5*   PROT 4.5*  4.5*     140   K 4.2  4.2   CO2 23  23     106   BUN 56*  56*   CREATININE 5.1*  5.1*   ALKPHOS 80  80   ALT 29  29   AST 13  13   BILITOT 0.9  0.9     All labs within the past 24 hours have been reviewed.

## 2019-03-29 NOTE — SUBJECTIVE & OBJECTIVE
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  No acute events overnight.  Pain controlled.  Denies numbness or tingling in extremity    Review of patient's allergies indicates:   Allergen Reactions    Ace inhibitors Other (See Comments)     Cough    Arb-angiotensin receptor antagonist Itching    Eplerenone Other (See Comments)     Marked bradycardia, 40, tiredness and weakness      Sulfa (sulfonamide antibiotics) Itching     Patient says this was 10 years ago and doesn't remember what happened       Current Facility-Administered Medications   Medication    0.9%  NaCl infusion    0.9%  NaCl infusion    acetaminophen tablet 650 mg    albuterol-ipratropium 2.5 mg-0.5 mg/3 mL nebulizer solution 3 mL    allopurinol tablet 100 mg    aspirin EC tablet 81 mg    atorvastatin tablet 80 mg    carvedilol tablet 50 mg    dextrose 50% injection 12.5 g    dextrose 50% injection 25 g    finasteride tablet 5 mg    gabapentin capsule 300 mg    glucagon (human recombinant) injection 1 mg    glucose chewable tablet 16 g    glucose chewable tablet 24 g    heparin 25,000 units in dextrose 5% (100 units/ml) IV bolus from bag - ADDITIONAL PRN BOLUS - 30 units/kg (max bolus 4000 units)    heparin 25,000 units in dextrose 5% (100 units/ml) IV bolus from bag - ADDITIONAL PRN BOLUS - 60 units/kg (max bolus 4000 units)    heparin 25,000 units in dextrose 5% 250 mL (100 units/mL) infusion LOW INTENSITY nomogram - [facility]    insulin aspart U-100 pen 1-10 Units    insulin detemir U-100 pen 8 Units    losartan tablet 100 mg    nitroGLYCERIN SL tablet 0.4 mg    ondansetron tablet 8 mg    pantoprazole EC tablet 40 mg    polyethylene glycol packet 17 g    senna-docusate 8.6-50 mg per tablet 1 tablet    tamsulosin 24 hr capsule 0.4 mg    torsemide  "tablet 20 mg    warfarin (COUMADIN) tablet 5 mg     Objective:     Vital Signs (Most Recent):  Temp: 98.8 °F (37.1 °C) (03/29/19 0300)  Pulse: 69 (03/29/19 0500)  Resp: 19 (03/29/19 0500)  BP: (!) 156/64 (03/29/19 0500)  SpO2: 98 % (03/29/19 0500) Vital Signs (24h Range):  Temp:  [98 °F (36.7 °C)-98.8 °F (37.1 °C)] 98.8 °F (37.1 °C)  Pulse:  [64-83] 69  Resp:  [12-25] 19  SpO2:  [94 %-100 %] 98 %  BP: (126-185)/(59-85) 156/64  Arterial Line BP: (160-167)/(74-79) 161/74     Weight: 106.6 kg (235 lb 0.2 oz)  Height: 6' 1" (185.4 cm)  Body mass index is 31.01 kg/m².      Intake/Output Summary (Last 24 hours) at 3/29/2019 0555  Last data filed at 3/29/2019 0501  Gross per 24 hour   Intake 294.83 ml   Output 305 ml   Net -10.17 ml       Ortho/SPM Exam  RIGHT UPPER EXTREMITY:      INSPECTION  - Skin intact, diffuse swelling to right forearm and hand. Peripheral IV in place in Antecubital fossa.   PALPATION  - Compartments in hand in forearm all full, but are soft and compressible on evaluation at 0545 this am. RANGE OF MOTION  - No pain with passive stretch of digits per patient  NEUROVASCULAR  - AIN/PIN/Radial/Median/Ulnar Nerves assessed in isolation without deficit  - SILT throughout M/R/U distribution  - Radial & Ulnar arteries palpated 1+  - Capillary Refill <3s      Significant Labs:   BMP:   Recent Labs   Lab 03/29/19 0142     100     140   K 4.2  4.2     106   CO2 23  23   BUN 56*  56*   CREATININE 5.1*  5.1*   CALCIUM 7.1*  7.1*   MG 2.2  2.2     CBC:   Recent Labs   Lab 03/28/19  0033 03/28/19 2014 03/29/19 0142   WBC 7.25 9.86 6.88  6.88  6.88   HGB 10.3* 9.6* 8.8*  8.8*  8.8*   HCT 29.9* 29.5* 26.2*  26.2*  26.2*   * 86* 86*  86*  86*     All pertinent labs within the past 24 hours have been reviewed.    Significant Imaging: I have reviewed all pertinent imaging results/findings.  "

## 2019-03-29 NOTE — PT/OT/SLP RE-EVAL
Physical Therapy Re-evaluation    Patient Name:  Micheal Hunt   MRN:  [id number]    Recommendations:     Discharge Recommendations:  rehabilitation facility   Discharge Equipment Recommendations: (TBD at next level of care)   Barriers to discharge: Inaccessible home, Decreased caregiver support and significantly below functional baseline    Assessment:     Micheal Hunt is a 78 y.o. 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  He presents with the following impairments/functional limitations:  weakness, impaired endurance, impaired functional mobilty, impaired self care skills, gait instability, visual deficits, decreased coordination, impaired cognition, decreased upper extremity function, decreased lower extremity function, pain, decreased safety awareness, abnormal tone, decreased ROM, impaired fine motor, impaired coordination, edema, impaired cardiopulmonary response to activity.  The patient shows impairment in R upper and lower extremity strength, aphasia, R facial droop, difficulty maintaining R eye open, L gaze preference but able to orient to stimuli on R. He was able to sit edge of bed 10 minutes with variable assist for trunk control between moderate assistance to 30 sec intervals of contact guard assist. Scooting laterally at edge of bed total assistance x2 due to difficulty following motor cues and lower extremity weakness. He would benefit from draw sheet transfer to [facility] chair with RN assist. Based on his current level of function, this patient is an excellent candidate for inpatient rehabilitation, has a qualifying diagnosis, shows increasing activity tolerance,  is motivated to participate in treatment, and has a supportive family willing to assist the patient upon discharge.     Rehab Prognosis:  Good; patient would benefit from acute skilled PT services to address these deficits and reach maximum level of function.   "    Recent Surgery: Procedure(s) (LRB):  Left heart cath (Left)      Plan:     During this hospitalization, patient to be seen 4 x/week to address the above listed problems via gait training, therapeutic activities, therapeutic exercises, neuromuscular re-education  · Plan of Care Expires:  19   Plan of Care Reviewed with: patient, family    Subjective     Communicated with RN prior to session.  Patient found supine with bed alarm, pulse ox (continuous), telemetry, SCD, peripheral IV upon PT entry to room, agreeable to evaluation.      Chief Complaint: unable to state, aphasic  Patient comments/goals: unable to state, family encouraging participation with therapy  Pain/Comfort:  · Pain Rating 1: (unable to state, winces with R upper extremity mobility)  · Pain Addressed 1: Reposition, Cessation of Activity(Maintain R upper extremity elevation)    Patients cultural, spiritual, Sabianist conflicts given the current situation: no      Objective:     Patient found with: bed alarm, pulse ox (continuous), telemetry, SCD, peripheral IV     General Precautions: Standard, aphasia, fall   Orthopedic Precautions:N/A   Braces: N/A     Exams:    Cognitive Exam  Patient is A&O x1, unable to state , location, date- responds inappropriately to questions. When asked where he was, stated "my eyes are thirsty". After trying to re-orient patient to year, he repeated "2019" in response to questions. Follows 20% of one -step commands    Fine Motor Coordination   -       Unable to test due to difficulty following instructions     Postural Exam Patient presented with the following abnormalities:    -       Rounded shoulders  -       Forward head  -       Kyphosis  -       Posterior pelvic tilt   Sensation    -       Light touch patient unable to localize touch, limited by aphasia   Skin Integrity/Edema     -       Skin integrity: visibly intact  -       Edema: significant swelling R upper extremity, ortho ruled out compartment " syndrome   R LE ROM WFL   R LE Strength Strength testing limited by difficulty following commands, 3+/5 hip flexion, knee ext/flex, and ankle DF/PF   L LE ROM WFL   L LE Strength  Strength testing limited by difficulty following commands, 0/5 hip flexion, 3/5 knee ext, 1/5 knee flex, and 0/5 ankle DF/PF     Balance          Static Sitting moderate assistance to contact guard assist   Dynamic Sitting contact guard assist               Functional Mobility:    Bed Mobility  Rolling to L: maximum assistance x2  Supine to Sit:  maximum assistance x2  Lateral scooting edge of bed: total assist x2   Transfers Sit to Stand:  deferred due to lower extremity weakness, difficulty following instructions       AM-PAC 6 CLICK MOBILITY  Total Score:8       Therapeutic Activities and Exercises:   Patient and family educated on role of therapy, goals of session, benefits of out of bed mobility. Patient agreeable to mobilize with therapy.  Discussed PT plan of care during hospitalization with family. Whiteboard updated as appropriate. Patient educated on how their diagnosis impacts their mobility within PT scope of practice. Attempted to re-orient patient, patient with minimal evidence of understanding. Patient's family educated to increase R sided stimulation to elicit his attention towards R side, educated on maintaining R upper extremity elevated, benefits of re-orienting patient. Performed static sitting balance edge of bed 10 minutes, facilitation for posture, R upper extremity elevated on pillows for shoulder approximation and weight shift. Patient able to maintain static sitting balance with 30 sec intervals of contact guard assist.     Patient left HOB elevated with all lines intact and call button in reach.    GOALS:   Multidisciplinary Problems     Physical Therapy Goals        Problem: Physical Therapy Goal    Goal Priority Disciplines Outcome Goal Variances Interventions   Physical Therapy Goal   (Resolved)     PT, PT/OT  Outcome(s) achieved     Description:  Pt met goals at [facility] to demonstrate safe level of mobility for d/c home.  Pt amb community distance with (S) only.           Problem: Physical Therapy Goal    Goal Priority Disciplines Outcome Goal Variances Interventions   Physical Therapy Goal     PT, PT/OT Ongoing (interventions implemented as appropriate)     Description:  Goals to be met by: 2019    Patient will increase functional independence with mobility by performin. Supine to sit with moderate assistance  2. Sit to supine with moderate Assistance  3. Sit to stand transfer with Maximum Assistance  4. Gait  x 2 feet with Maximum Assistance using least restrictive device  5. Sitting at edge of bed x15 minutes with Contact Guard Assistance  6. Lower extremity exercise program x20 reps per handout, with assistance as needed                      History:     Past Medical History:   Diagnosis Date    NICK (acute kidney injury) 2016    Allergy     Anemia, mild 12/15/2014    Arthritis     Gout    Benign essential HTN 3/27/2012    BMI 29.0-29.9,adult 5/10/2018    BPH (benign prostatic hyperplasia)     BPH (benign prostatic hyperplasia)     CAD (coronary artery disease)     Chronic kidney disease     due to ibuprofen    Colon polyp     CRF (chronic renal failure), stage 5     Diverticulosis     Gastritis     GERD (gastroesophageal reflux disease)     Gout     History of colon polyps 5/3/2018    HTN (hypertension) 3/27/2012    Hyperlipidemia     Hyperlipidemia     LLL pneumonia 2018    LVH (left ventricular hypertrophy)     Mesenteric ischemia     Murmur, cardiac 3/27/2012    GRICELDA (obstructive sleep apnea)     DOES NOT USE A MACHINE    Sinus problem     Syncope and collapse        Past Surgical History:   Procedure Laterality Date    APPENDECTOMY      BLOCK-NERVE Left 2016    [facility]    CARDIAC CATHETERIZATION      COLONOSCOPY       COLONOSCOPY N/A 5/3/2018    [facility]O    COLONOSCOPY N/A 9/10/2015    [facility]    CORONARY ARTERY BYPASS GRAFT  4/2007    x 1    CYSTOSCOPY N/A 8/30/2017    [facility]    CYSTOSCOPY N/A 11/10/2015    [facility]    ESOPHAGOGASTRODUODENOSCOPY (EGD) N/A 1/4/2016    [facility] (2ND FLR)    ESOPHAGOGASTRODUODENOSCOPY (EGD) N/A 10/15/2014    [facility] at [facility] ENDO    INJECTION-STEROID-EPIDURAL-TRANSFORAMINAL Left 2/25/2016    [facility] OR    JOINT REPLACEMENT      left knee total replacement  X 3    mid leftt finger      from a cactuss    MOLE REMOVAL  2016    RADIOFREQUENCY THERMOCOAGULATION (RFTC)-NERVE-MEDIAN BRANCH-LUMBAR Left 4/11/2016    [facility] OR    rotative cuff      no rotative cuffs on bilat shoulders has pins     SHOULDER SURGERY      shoulder surgery bilat  RIGHT X 4; LEFT X 3    TRANSRECTAL ULTRASOUND GUIDED PROSTATE BIOPSY Bilateral 11/10/2015    [facility] OR    ULTRASOUND-ENDOSCOPIC-UPPER N/A 5/31/2017    [facility] ENDO (2ND FLR)    ULTRASOUND-ENDOSCOPIC-UPPER N/A 1/4/2016    [facility] ENDO (2ND FLR)    ULTRASOUND-ENDOSCOPIC-UPPER N/A 1/16/2015    Performed by Jason Saleem [facility] at [facility] ENDO (2ND FLR)       Time Tracking:     PT Received On: 03/29/19  PT Start Time: 1410     PT Stop Time: 1435  PT Total Time (min): 25 min     Billable Minutes: Re-eval 17 and Therapeutic Activity 8      Salome Branchasa, PT  03/29/2019

## 2019-03-29 NOTE — PLAN OF CARE
Problem: SLP Goal  Goal: SLP Goal  Goals to be met 4/1  1 pt will tolerate regular diet and thin liquids/met  2 pt will participate in speech lang eval/met  3.  [place] to time and place  4.  Respond to categorization tasks with 80% accuracy  5.  Assess functional reading and writing skills  6.  Respond to problem solving tasks with 80% accuracy      Continue POC at this time. Goals remain appropriate  Linda Farley CCC-SLP  3/29/2019

## 2019-03-29 NOTE — CONSULTS
Placed 18g X 8cm midline in left basilic vein of LUE using realtime ultrasound guidance. Lot#[id number]. Remove on or before 04/27/2019.

## 2019-03-29 NOTE — PT/OT/SLP PROGRESS
Occupational Therapy  HOLD      Patient Name:  Micheal Hunt   MRN:  [id number]    PT to evaluate on this date.   OT to follow up over weekend for evaluation as appropriate.     DIANE Omlos  3/29/2019

## 2019-03-29 NOTE — PROGRESS NOTES
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 Patient was admitted to Hospital Medicine 3/22 for evaluation of unstable angina. On 3/24 @ 1000, the patient's daughter came to visit and she noticed him lethargic, disoriented which soon progressed to include facial droop and dysarthria. A sroke code was called in response to new onset R weakness and dysarthria. MRI was completed noting Acute L PCA infarct. Per Vascular Neuro staff, the symptoms improved while supine during imaging. The patient was subsequently transferred to [facility] for further management. The patient does have a history of coumadin and ASA use. During admission he was placed on a heparin drip for Afib but it was quickly discontinued after the patient began to experience feeling of euphoria which were resolved with discontinuation of heparin gtt.              Hospital Course: 3/25: NAEO.   3/26 Had few beats of Vtach this am   3/27: NAEO  3/28: NAEO  03/28/2019 RUE appeared swollen and tender with worsening weakness. Repeat CT with no new infarct or hemorrhage. [place] and IV removed. US RUE ordered, orthopedics consulted. EEG pending.   03/29/2019 R groin hematoma noted on exam. ASA held for drop in platelets. Coreg decreased.      Review of Systems   Unable to perform ROS: Other   Respiratory: Negative for shortness of breath.    Neurological: Positive for facial asymmetry, speech difficulty and weakness.       Objective:     Vitals:  Temp: 98.4 °F (36.9 °C)  Pulse: 67  Rhythm: atrial rhythm  BP: (!) 124/58  MAP (mmHg): 84  Resp:  16  SpO2: 97 %  O2 Device (Oxygen Therapy): room air    Temp  Min: 98.4 °F (36.9 °C)  Max: 98.8 °F (37.1 °C)  Pulse  Min: 66  Max: 79  BP  Min: 124/58  Max: 185/73  MAP (mmHg)  Min: 84  Max: 112  Resp  Min: 9  Max: 25  SpO2  Min: 93 %  Max: 100 %  Oxygen Concentration (%)  Min: 28  Max: 28    03/28 0701 - 03/29 0700  In: 289.8 [I.V.:289.8]  Out: 305 [Urine:305]   Unmeasured Output  Urine Occurrence: 1  Stool Occurrence: 1  Pad Count: 2       Physical Exam   Constitutional: No distress.   Neurological: He is alert.   [id number]  Alert. Oriented to person only.  Dysarthric+. Follows simple commands.  Pupils reactive bilaterally.  R facial droop  RUE - 0/5  RLE - 1/5  LUE - 5/5  LLE - 3/5    NIHSS - 16   Nursing note and vitals reviewed.      Medications:  Continuous  heparin (porcine) in D5W Last Rate: 14 Units/kg/hr (03/29/19 1100)   Scheduled  sodium chloride 0.9%  Once   allopurinol 100 mg Daily   atorvastatin 80 mg Daily   carvedilol 25 mg BID WM   finasteride 5 mg Daily   gabapentin 300 mg QHS   insulin detemir U-100 8 Units Daily   losartan 100 mg QHS   pantoprazole 40 mg Daily   polyethylene glycol 17 g Daily   senna-docusate 8.6-50 mg 1 tablet BID   tamsulosin 1 capsule Daily   torsemide 20 mg BID   warfarin 5 mg Daily   PRN  sodium chloride 0.9%  PRN   acetaminophen 650 mg Q6H PRN   albuterol-ipratropium 3 mL Q4H PRN   dextrose 50% 12.5 g PRN   dextrose 50% 25 g PRN   glucagon (human recombinant) 1 mg PRN   glucose 16 g PRN   glucose 24 g PRN   heparin (PORCINE) 30 Units/kg (Adjusted) PRN   heparin (PORCINE) 44.2 Units/kg (Adjusted) PRN   insulin aspart U-100 1-10 Units QID (AC + HS) PRN   nitroGLYCERIN 0.4 mg Q5 Min PRN   ondansetron 8 mg Q6H PRN     Today I personally reviewed pertinent medications, lines/drains/airways, imaging, cardiology results, laboratory results, microbiology results, notably:    Diet  Diet Dysphagia Mechanical Soft (IDDSI Level 5) [facility]; Cardiac (Low Na/Chol), Coumadin  Restriction, Renal; Nectar Thick  Diet Dysphagia Mechanical Soft (IDDSI Level 5) [facility]; Cardiac (Low Na/Chol), Coumadin Restriction, Renal; Nectar Thick        Assessment/Plan:     Neuro  * 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 CTA with L vert and PCA stenosis along with bilateral ICA stenosis R>L  MRI from 3/24 with L thalamic stroke. MRI from 3/26 with evolution of L thalamic stroke and multiple new new infarcts in the L PCa territory.     -- Etiology cardioembolic vs atheroembolic.  -- No tPA  -- Atorvastatin 80mg. Holding ASA temporarily due to thrombocytopenia.  -- Heparin gtt for Afib. Bridging to Coumadin.  -- Neuro checks q1  -- Goal SBP<180 (completed stroke on most recent MRI).  --  PT/OT  -- TTE with bi atrial enlargement and PFO (L-->R shunt).  -- US LE with nearly occlusive thrombus in the peroneal veins.  -- STAT CT head on 3/28 done for acute worsening of RSW. No acute hemorrhage or new ischemic stroke noted.  -- Appreciate vascular neurology recs.         Encephalopathy  -- Fluctuating mental status.  -- Similar episode 2 nights ago with improvement by the next morning. Sundowning?  -- EEG with generalized slowing and no electrographic seizures recorded.    Pulmonary  Severe persistent asthma with exacerbation  -- Started on Azithromycin and steroids at OSH due to concern for Asthma.  -- Pulmonology consulted, no concern for asthma. Appreciate recs.  -- Abx and steroids d/derrick.  -- Duonebs PRN    Cardiac/Vascular  Paroxysmal atrial fibrillation  -- Rate controlled  -- Heparin gtt, bridging to Coumadin.    NSTEMI (non-ST elevated myocardial infarction)  -- Transferred from [facility] for [facility] but patient developed a L PCA infarct shortly after admission.  -- Cardiology consulted on arrival, appreciate recs.  -- No intervention for now, recommending maximal  medical therapy  -- On heparin gtt for Afib. Started bridge to coumadin on 3/27.  -- Coreg decreased back to 25 BID given persistent hypotension on 50 BID dosing which correlated with worsening RSW.   -- ASA held temporarily due to decrease in platelets. No concern for HIT as of now. If continues to drop, will hold heparin gtt.  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    CAD (coronary artery disease), 2V CABG 2007  -- H/o CAD/ CABG 2007  -- Continue Atorvastatin 80. ASA held today give drop in platelets from 101-->80s  -- Heparin gtt. Intiated bridge to coumadin on 3/27.  -- Appreciate cardiology recs.      Hyperlipidemia, baseline   -- LDL 46  -- High intensity statin for carotid stenosis and L PCA stroke.    Essential hypertension  -- Target SBP<180    Carotid artery stenosis  -- No surgical intervention per vascular surgery recs. Medical management for now      Renal/  ESRD (end stage renal disease) on dialysis  -- H/o ESRD on HD (MWF)  -- LUE AVF+. Ultrasound on 3/25 showed patency of the fistula site.  -- Nephrology following, appreciate recs.  -- Noted to have mild oozing from fistula site on 3/28. No tenderness/swelling. H/h stable. Resolved as of 3/29.    Benign prostatic hyperplasia without lower urinary tract symptoms  -- Continue Finasteride and Flomax.    Hematology  Long term (current) use of anticoagulants  -- Continue heparin gtt  -- Started coumadin bridge.       Oncology  Anemia of chronic disease  -- H/h stable.  -- On heparin gtt  -- Daily CBC    Endocrine  Type 2 diabetes mellitus, without long-term current use of insulin  -- A1c 8.3  -- Lev 8 qHS + SSI AC&HS PRN  -- POC AC&HS    Orthopedic  Groin hematoma  -- Noted on the R groin  -- Noted on 3/28 with interval expansion  -- If hematoma starts to expand, will get US to look for pseudoaneurysm and possibly hold heparin  gtt.    Forearm swelling  -- Acutely swollen RUE noted on 3/28  -- [place] and IV removed with partial improvement of swelling.  -- No concern for compartment syndrome per Orthopedics. Appreciate recs.  -- Keep limb elevated.    Gout, arthritis  -- Allopurinol 100 daily    Other  SOB (shortness of breath)  -- Currently on RA          The patient is being Prophylaxed for:  Venous Thromboembolism with: Chemical  Stress Ulcer with: PPI  Ventilator Pneumonia with: none    Activity Orders          Diet Dysphagia Mechanical Soft (IDDSI Level 5) [facility]; Cardiac (Low Na/Chol), Coumadin Restriction, Renal; Nectar Thick: Dysphagia 2 (Mechanical Soft Ground) starting at 03/29 1144        Full Code    Ethan Costello MD  Neurocritical Care  [facility]

## 2019-03-29 NOTE — ASSESSMENT & PLAN NOTE
Stoke risk factor   No ongoing signs/symptoms of ischemia  Continue single Aspirin 81mg and heparin gtt with transition to warfarin (INR 1.1)   Continue coreg 25 bid and losartan 100mg  Continue high intensity statin

## 2019-03-29 NOTE — CONSULTS
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       HPI: Patient is a 77 yo male with PMHCAD s/p CABG 2007, ESRD, and Asthma transferred initially for evaluation of unstable angina until becoming symptomatic with R side weakness and dysarthria. He is currently in Neuro ICU for stroke/ seizure workup. Ortho consulted as r/o compartment syndrome. Patient had extravasation from arterial line early this AM causing swelling of RUE. Per family swelling has improved since AM. Patient in minimal pain and is controlled with minimal pain medication at this time. Denies paresthesias.         Past Medical History:   Diagnosis Date    NICK (acute kidney injury) 7/29/2016    Allergy     Anemia, mild 12/15/2014    Arthritis     Gout    Benign essential HTN 3/27/2012    BMI 29.0-29.9,adult 5/10/2018    BPH (benign prostatic hyperplasia)     BPH (benign prostatic hyperplasia)     CAD (coronary artery disease) 2006    Chronic kidney disease     due to ibuprofen    Colon polyp     CRF (chronic renal failure), stage 5     Diverticulosis     Gastritis     GERD (gastroesophageal reflux disease)     Gout     History of colon polyps 5/3/2018    HTN (hypertension) 3/27/2012    Hyperlipidemia     Hyperlipidemia     LLL pneumonia 6/14/2018    LVH (left ventricular hypertrophy)     Mesenteric ischemia     Murmur, cardiac 3/27/2012    GRICELDA (obstructive sleep apnea)     DOES NOT USE A MACHINE    Sinus problem     Syncope and collapse         Past Surgical History:   Procedure Laterality Date    APPENDECTOMY      BLOCK-NERVE Left 5/31/2016    Performed by [facility] at [facility] OR    CARDIAC CATHETERIZATION      COLONOSCOPY  2011    COLONOSCOPY N/A 5/3/2018    [facility] at [facility] ENDO    COLONOSCOPY N/A 9/10/2015    [facility] by [facility] at [facility] ENDO    CORONARY ARTERY BYPASS GRAFT  4/2007    x 1    CYSTOSCOPY N/A 8/30/2017    Performed by Rudy [facility]ring [facility] at [facility] OR    CYSTOSCOPY N/A 11/10/2015    [facility] by [facility] at [facility] OR    ESOPHAGOGASTRODUODENOSCOPY (EGD) N/A 1/4/2016    [facility]formed by Tra [facility] at [facility] ([facility])    ESOPHAGOGASTRODUODENOSCOPY (EGD) N/A 10/15/2014    Performed by Messi Harris [facility] at [facility] ENDO    INJECTION-STEROID-EPIDURAL-TRANSFORAMINAL Left 2/25/2016    [facility]formed by [facility] at [facility] OR    JOINT REPLACEMENT      left knee total replacement  X 3    mid leftt finger      from a cactuss    MOLE REMOVAL  2016    RADIOFREQUENCY THERMOCOAGULATION (RFTC)-NERVE-MEDIAN BRANCH-LUMBAR Left 4/11/2016    Performed by [facility] at [facility] OR    rotative cuff      no rotative cuffs on bilat shoulders has pins     SHOULDER SURGERY      shoulder surgery bilat  RIGHT X 4; LEFT X 3    TRANSRECTAL ULTRASOUND GUIDED PROSTATE BIOPSY Bilateral 11/10/2015    Performed by [facility] at [facility] OR    ULTRASOUND-ENDOSCOPIC-UPPER N/A 5/31/2017    [facility] ([facility])    ULTRASOUND-ENDOSCOPIC-UPPER N/A 1/4/2016    Performed by Tra [facility] at [facility] (2ND FLR)    ULTRASOUND-ENDOSCOPIC-UPPER N/A 1/16/2015    Performed by Jason Saleem MD at [facility] (2ND FLR)       Review of patient's allergies indicates:   Allergen Reactions    Ace inhibitors Other (See Comments)     Cough    Arb-angiotensin receptor antagonist Itching    Eplerenone Other (See Comments)     Marked bradycardia, 40, tiredness and  weakness      Sulfa (sulfonamide antibiotics) Itching     Patient says this was 10 years ago and doesn't remember what happened       Current Facility-Administered Medications   Medication    0.9%  NaCl infusion    0.9%  NaCl infusion    acetaminophen tablet 650 mg    albuterol-ipratropium 2.5 mg-0.5 mg/3 mL nebulizer solution 3 mL    allopurinol tablet 100 mg    aspirin EC tablet 81 mg    atorvastatin tablet 80 mg    carvedilol tablet 50 mg    dextrose 50% injection 12.5 g    dextrose 50% injection 25 g    finasteride tablet 5 mg    gabapentin capsule 300 mg    glucagon (human recombinant) injection 1 mg    glucose chewable tablet 16 g    glucose chewable tablet 24 g    heparin 25,000 units in dextrose 5% (100 units/ml) IV bolus from bag - ADDITIONAL PRN BOLUS - 30 units/kg (max bolus 4000 units)    heparin 25,000 units in dextrose 5% (100 units/ml) IV bolus from bag - ADDITIONAL PRN BOLUS - 60 units/kg (max bolus 4000 units)    heparin 25,000 units in dextrose 5% 250 mL (100 units/mL) infusion LOW INTENSITY nomogram - [facility]    insulin aspart U-100 pen 1-10 Units    insulin detemir U-100 pen 8 Units    losartan tablet 100 mg    nitroGLYCERIN SL tablet 0.4 mg    ondansetron tablet 8 mg    pantoprazole EC tablet 40 mg    polyethylene glycol packet 17 g    senna-docusate 8.6-50 mg per tablet 1 tablet    tamsulosin 24 hr capsule 0.4 mg    torsemide tablet 20 mg    warfarin (COUMADIN) tablet 5 mg     Family History     Problem Relation (Age of Onset)    Cancer Father    Heart disease Mother, Sister    Hypertension Brother    Pneumonia Sister    Stroke Sister    Sudden death Father        Tobacco Use    Smoking status: Never Smoker    Smokeless tobacco: Never Used   Substance and Sexual Activity    Alcohol use: No    Drug use: No    Sexual activity: Not on file     ROS   Constitutional: negative for fevers  Eyes: no visual changes  ENT: negative for hearing loss  Respiratory: negative for  "dyspnea  Cardiovascular: negative for chest pain  Gastrointestinal: negative for abdominal pain  Genitourinary: negative for dysuria  Neurological: negative for headaches  Behavioral/Psych: negative for hallucinations  Endocrine: negative for temperature intolerance    Objective:     Vital Signs (Most Recent):  Temp: 98.6 °F (37 °C) (03/28/19 1500)  Pulse: 67 (03/28/19 1957)  Resp: 15 (03/28/19 1957)  BP: (!) 163/71 (03/28/19 1800)  SpO2: 97 % (03/28/19 1957) Vital Signs (24h Range):  Temp:  [98 °F (36.7 °C)-98.8 °F (37.1 °C)] 98.6 °F (37 °C)  Pulse:  [64-90] 67  Resp:  [11-31] 15  SpO2:  [95 %-100 %] 97 %  BP: (126-185)/(59-76) 163/71  Arterial Line BP: (117-180)/(59-84) 161/74     Weight: 106.6 kg (235 lb 0.2 oz)  Height: 6' 1" (185.4 cm)  Body mass index is 31.01 kg/m².      Intake/Output Summary (Last 24 hours) at 3/28/2019 2001  Last data filed at 3/28/2019 1800  Gross per 24 hour   Intake 1926.93 ml   Output 1866 ml   Net 60.93 ml       Ortho/SPM Exam  Physical Exam:  Gen:  No acute distress  CV:  Peripherally well-perfused.  Pulses 2+ bilaterally.  Lungs:  Normal respiratory effort.  Abdomen:  Soft, non-tender, non-distended  Head/Neck:  Normocephalic.  Atraumatic. No TTP, AROM and PROM intact without pain  Neuro:  CN intact without deficit, SILT throughout B/L Upper & Lower Extremities  Pelvis: No TTP, Stable to direct anterior pressure over ASIS.    RIGHT UPPER EXTREMITY:     INSPECTION  - Skin intact, diffuse swelling to right forearm and hand. Peripheral IV in AC fossa.   PALPATION  - Compartments in hand in forearm all soft and compressible during two separate evaluations.   RANGE OF MOTION  - No pain with passive stretch.   NEUROVASCULAR  - AIN/PIN/Radial/Median/Ulnar Nerves assessed in isolation without deficit  - SILT throughout  - Radial & Ulnar arteries palpated 2+  - Capillary Refill <3s      Significant Labs: All pertinent labs within the past 24 hours have been reviewed.    Significant Imaging: I " have reviewed and interpreted all pertinent imaging results/findings.    Assessment/Plan:     Forearm swelling  Micheal Hunt is a 78 y.o. male with extravasation from arterial line in RUE. Evaluated on two separate occasion four hours apart with no worsening of swelling. All compartments soft and compressible.     - No concern for compartment syndrome. Keep arm iced and elevated to decrease swelling. Please call if swelling worsens.            Nate Aguilar MD  Orthopedics  [facility]

## 2019-03-29 NOTE — ASSESSMENT & PLAN NOTE
78 yr old male with acute ischemic infarct in the L mesial temporal lobe, splenium of corpus and thalamus. CTA with L vert and PCA stenosis along with bilateral ICA stenosis R>L  MRI from 3/24 with L thalamic stroke. MRI from 3/26 with evolution of L thalamic stroke and multiple new new infarcts in the L PCa territory.     -- Etiology cardioembolic vs atheroembolic.  -- No tPA  -- Atorvastatin 80mg. Holding ASA temporarily due to thrombocytopenia.  -- Heparin gtt for Afib. Bridging to Coumadin.  -- Neuro checks q1  -- Goal SBP<180 (completed stroke on most recent MRI).  --  PT/OT  -- TTE with bi atrial enlargement and PFO (L-->R shunt).  -- US LE with nearly occlusive thrombus in the peroneal veins.  -- STAT CT head on 3/28 done for acute worsening of RSW. No acute hemorrhage or new ischemic stroke noted.  -- Appreciate vascular neurology recs.

## 2019-03-29 NOTE — SUBJECTIVE & OBJECTIVE
Neurologic Chief Complaint: right sided weakness    Subjective:     Interval History: Patient is seen for follow-up neurological assessment and treatment recommendations: see hospital course for interval history    HPI, Past Medical, Family, and Social History remains the same as documented in the initial encounter.     Review of Systems   Unable to perform ROS: Mental status change   Constitutional: Negative for chills, fatigue and fever.   HENT: Negative for postnasal drip and sore throat.    Eyes: Negative.    Respiratory: Negative for chest tightness and shortness of breath.    Cardiovascular: Negative for chest pain, palpitations and leg swelling.   Gastrointestinal: Negative for abdominal pain, blood in stool, constipation and nausea.   Endocrine: Negative.    Genitourinary: Negative for dysuria.   Musculoskeletal: Negative for arthralgias, back pain and joint swelling.   Skin: Negative.    Allergic/Immunologic: Negative.    Neurological: Positive for dizziness, speech difficulty and weakness. Negative for light-headedness and headaches.   Psychiatric/Behavioral: Positive for confusion. Negative for dysphoric mood. The patient is not nervous/anxious and is not hyperactive.      Scheduled Meds:   sodium chloride 0.9%   Intravenous Once    allopurinol  100 mg Oral Daily    atorvastatin  80 mg Oral Daily    carvedilol  50 mg Oral BID WM    finasteride  5 mg Oral Daily    gabapentin  300 mg Oral QHS    insulin detemir U-100  8 Units Subcutaneous Daily    losartan  100 mg Oral QHS    pantoprazole  40 mg Oral Daily    polyethylene glycol  17 g Oral Daily    senna-docusate 8.6-50 mg  1 tablet Oral BID    tamsulosin  1 capsule Oral Daily    torsemide  20 mg Oral BID    warfarin  5 mg Oral Daily     Continuous Infusions:   heparin (porcine) in D5W 14 Units/kg/hr (03/29/19 0800)     PRN Meds:sodium chloride 0.9%, acetaminophen, albuterol-ipratropium, dextrose 50%, dextrose 50%, glucagon (human  recombinant), glucose, glucose, heparin (PORCINE), heparin (PORCINE), insulin aspart U-100, nitroGLYCERIN, ondansetron    Objective:     Vital Signs (Most Recent):  Temp: 98.8 °F (37.1 °C) (03/29/19 0300)  Pulse: 66 (03/29/19 0800)  Resp: (!) 9 (03/29/19 0800)  BP: (!) 169/78 (03/29/19 0701)  SpO2: 97 % (03/29/19 0800)  BP Location: Left leg    Vital Signs Range (Last 24H):  Temp:  [98.2 °F (36.8 °C)-98.8 °F (37.1 °C)]   Pulse:  [64-79]   Resp:  [9-25]   BP: (126-185)/(59-85)   SpO2:  [93 %-100 %]   BP Location: Left leg    Physical Exam   Constitutional: He is oriented to person, place, and time. He appears well-developed and well-nourished. He is cooperative.  Non-toxic appearance. He does not have a sickly appearance. He does not appear ill. No distress.   HENT:   Head: Normocephalic and atraumatic.   Eyes: Pupils are equal, round, and reactive to light.   Cardiovascular: S1 normal, S2 normal and intact distal pulses. An irregularly irregular rhythm present.   Murmur heard.  Pulses:       Radial pulses are 2+ on the right side, and 2+ on the left side.        Dorsalis pedis pulses are 2+ on the right side, and 2+ on the left side.   Pulmonary/Chest: He has decreased breath sounds. He has rales.   Abdominal: Soft.   Musculoskeletal: He exhibits no edema.   Neurological: He is alert and oriented to person, place, and time. No cranial nerve deficit or sensory deficit.   0/5 strength on R UE - worse d/d  2/5 strength R LE - worse d/d - significant change  5/5 on left  Some evidence of hemispatial neglect   Dysarthria and asphasia worse d/d    Skin: Skin is warm and dry.   Tense R forearm compartment  Unable to properly assess sensation for parasthesia 2/2 stroke - patient responses unreliable  Patient grimacing with passive movement of fingers  Pulses remain present distally  Upper arm with flaccid compartments    Nursing note and vitals reviewed.      Neurological Exam:   LOC: less alert d/d with increasing  confusion  Language: + aphasia, dysarthria challenges in seeing/expressing words/letters in R field of vision suggestive of R superior quadrantanopsia   Pupils (CN II, III): PERRL  Motor in physical exam - worsening R sided function d/d     Laboratory:  Recent Results (from the past 24 hour(s))   POCT glucose    Collection Time: 03/28/19 11:46 AM   Result Value Ref Range    POCT Glucose 216 (H) 70 - 110 mg/dL   POCT glucose    Collection Time: 03/28/19  5:48 PM   Result Value Ref Range    POCT Glucose 137 (H) 70 - 110 mg/dL   CBC auto differential    Collection Time: 03/28/19  8:14 PM   Result Value Ref Range    WBC 9.86 3.90 - 12.70 K/uL    RBC 2.98 (L) 4.60 - 6.20 M/uL    Hemoglobin 9.6 (L) 14.0 - 18.0 g/dL    Hematocrit 29.5 (L) 40.0 - 54.0 %    MCV 99 (H) 82 - 98 fL    MCH 32.2 (H) 27.0 - 31.0 pg    MCHC 32.5 32.0 - 36.0 g/dL    RDW 15.3 (H) 11.5 - 14.5 %    Platelets 86 (L) 150 - 350 K/uL    MPV 10.3 9.2 - 12.9 fL    Immature Granulocytes 0.9 (H) 0.0 - 0.5 %    Gran # (ANC) 7.5 1.8 - 7.7 K/uL    Immature Grans (Abs) 0.09 (H) 0.00 - 0.04 K/uL    Lymph # 1.5 1.0 - 4.8 K/uL    Mono # 0.7 0.3 - 1.0 K/uL    Eos # 0.1 0.0 - 0.5 K/uL    Baso # 0.01 0.00 - 0.20 K/uL    nRBC 0 0 /100 WBC    Gran% 76.1 (H) 38.0 - 73.0 %    Lymph% 15.2 (L) 18.0 - 48.0 %    Mono% 7.2 4.0 - 15.0 %    Eosinophil% 0.5 0.0 - 8.0 %    Basophil% 0.1 0.0 - 1.9 %    Differential Method Automated    APTT    Collection Time: 03/28/19  8:14 PM   Result Value Ref Range    aPTT 52.2 (H) 21.0 - 32.0 sec   Protime-INR    Collection Time: 03/28/19  8:14 PM   Result Value Ref Range    Prothrombin Time 11.2 9.0 - 12.5 sec    INR 1.1 0.8 - 1.2   POCT glucose    Collection Time: 03/28/19 10:50 PM   Result Value Ref Range    POCT Glucose 131 (H) 70 - 110 mg/dL   APTT    Collection Time: 03/29/19  1:42 AM   Result Value Ref Range    aPTT 56.9 (H) 21.0 - 32.0 sec   APTT    Collection Time: 03/29/19  1:42 AM   Result Value Ref Range    aPTT 56.9 (H) 21.0 - 32.0  sec   Comprehensive metabolic panel    Collection Time: 03/29/19  1:42 AM   Result Value Ref Range    Sodium 140 136 - 145 mmol/L    Potassium 4.2 3.5 - 5.1 mmol/L    Chloride 106 95 - 110 mmol/L    CO2 23 23 - 29 mmol/L    Glucose 100 70 - 110 mg/dL    BUN, Bld 56 (H) 8 - 23 mg/dL    Creatinine 5.1 (H) 0.5 - 1.4 mg/dL    Calcium 7.1 (L) 8.7 - 10.5 mg/dL    Total Protein 4.5 (L) 6.0 - 8.4 g/dL    Albumin 2.5 (L) 3.5 - 5.2 g/dL    Total Bilirubin 0.9 0.1 - 1.0 mg/dL    Alkaline Phosphatase 80 55 - 135 U/L    AST 13 10 - 40 U/L    ALT 29 10 - 44 U/L    Anion Gap 11 8 - 16 mmol/L    eGFR if African American 11.5 (A) >60 mL/min/1.73 m^2    eGFR if non African American 9.9 (A) >60 mL/min/1.73 m^2   Magnesium    Collection Time: 03/29/19  1:42 AM   Result Value Ref Range    Magnesium 2.2 1.6 - 2.6 mg/dL   Phosphorus    Collection Time: 03/29/19  1:42 AM   Result Value Ref Range    Phosphorus 5.0 (H) 2.7 - 4.5 mg/dL   APTT    Collection Time: 03/29/19  1:42 AM   Result Value Ref Range    aPTT 56.9 (H) 21.0 - 32.0 sec   Protime-INR    Collection Time: 03/29/19  1:42 AM   Result Value Ref Range    Prothrombin Time 11.2 9.0 - 12.5 sec    INR 1.1 0.8 - 1.2   CBC auto differential    Collection Time: 03/29/19  1:42 AM   Result Value Ref Range    WBC 6.88 3.90 - 12.70 K/uL    RBC 2.71 (L) 4.60 - 6.20 M/uL    Hemoglobin 8.8 (L) 14.0 - 18.0 g/dL    Hematocrit 26.2 (L) 40.0 - 54.0 %    MCV 97 82 - 98 fL    MCH 32.5 (H) 27.0 - 31.0 pg    MCHC 33.6 32.0 - 36.0 g/dL    RDW 15.4 (H) 11.5 - 14.5 %    Platelets 86 (L) 150 - 350 K/uL    MPV 10.3 9.2 - 12.9 fL    Immature Granulocytes 0.7 (H) 0.0 - 0.5 %    Gran # (ANC) 5.0 1.8 - 7.7 K/uL    Immature Grans (Abs) 0.05 (H) 0.00 - 0.04 K/uL    Lymph # 1.3 1.0 - 4.8 K/uL    Mono # 0.4 0.3 - 1.0 K/uL    Eos # 0.1 0.0 - 0.5 K/uL    Baso # 0.01 0.00 - 0.20 K/uL    nRBC 0 0 /100 WBC    Gran% 72.4 38.0 - 73.0 %    Lymph% 19.5 18.0 - 48.0 %    Mono% 6.3 4.0 - 15.0 %    Eosinophil% 1.0 0.0 - 8.0 %     Basophil% 0.1 0.0 - 1.9 %    Differential Method Automated    APTT    Collection Time: 03/29/19  1:42 AM   Result Value Ref Range    aPTT 56.9 (H) 21.0 - 32.0 sec   CBC auto differential    Collection Time: 03/29/19  1:42 AM   Result Value Ref Range    WBC 6.88 3.90 - 12.70 K/uL    RBC 2.71 (L) 4.60 - 6.20 M/uL    Hemoglobin 8.8 (L) 14.0 - 18.0 g/dL    Hematocrit 26.2 (L) 40.0 - 54.0 %    MCV 97 82 - 98 fL    MCH 32.5 (H) 27.0 - 31.0 pg    MCHC 33.6 32.0 - 36.0 g/dL    RDW 15.4 (H) 11.5 - 14.5 %    Platelets 86 (L) 150 - 350 K/uL    MPV 10.3 9.2 - 12.9 fL    Immature Granulocytes 0.7 (H) 0.0 - 0.5 %    Gran # (ANC) 5.0 1.8 - 7.7 K/uL    Immature Grans (Abs) 0.05 (H) 0.00 - 0.04 K/uL    Lymph # 1.3 1.0 - 4.8 K/uL    Mono # 0.4 0.3 - 1.0 K/uL    Eos # 0.1 0.0 - 0.5 K/uL    Baso # 0.01 0.00 - 0.20 K/uL    nRBC 0 0 /100 WBC    Gran% 72.4 38.0 - 73.0 %    Lymph% 19.5 18.0 - 48.0 %    Mono% 6.3 4.0 - 15.0 %    Eosinophil% 1.0 0.0 - 8.0 %    Basophil% 0.1 0.0 - 1.9 %    Differential Method Automated    CBC auto differential    Collection Time: 03/29/19  1:42 AM   Result Value Ref Range    WBC 6.88 3.90 - 12.70 K/uL    RBC 2.71 (L) 4.60 - 6.20 M/uL    Hemoglobin 8.8 (L) 14.0 - 18.0 g/dL    Hematocrit 26.2 (L) 40.0 - 54.0 %    MCV 97 82 - 98 fL    MCH 32.5 (H) 27.0 - 31.0 pg    MCHC 33.6 32.0 - 36.0 g/dL    RDW 15.4 (H) 11.5 - 14.5 %    Platelets 86 (L) 150 - 350 K/uL    MPV 10.3 9.2 - 12.9 fL    Immature Granulocytes 0.7 (H) 0.0 - 0.5 %    Gran # (ANC) 5.0 1.8 - 7.7 K/uL    Immature Grans (Abs) 0.05 (H) 0.00 - 0.04 K/uL    Lymph # 1.3 1.0 - 4.8 K/uL    Mono # 0.4 0.3 - 1.0 K/uL    Eos # 0.1 0.0 - 0.5 K/uL    Baso # 0.01 0.00 - 0.20 K/uL    nRBC 0 0 /100 WBC    Gran% 72.4 38.0 - 73.0 %    Lymph% 19.5 18.0 - 48.0 %    Mono% 6.3 4.0 - 15.0 %    Eosinophil% 1.0 0.0 - 8.0 %    Basophil% 0.1 0.0 - 1.9 %    Differential Method Automated    Comprehensive metabolic panel    Collection Time: 03/29/19  1:42 AM   Result Value Ref Range     Sodium 140 136 - 145 mmol/L    Potassium 4.2 3.5 - 5.1 mmol/L    Chloride 106 95 - 110 mmol/L    CO2 23 23 - 29 mmol/L    Glucose 100 70 - 110 mg/dL    BUN, Bld 56 (H) 8 - 23 mg/dL    Creatinine 5.1 (H) 0.5 - 1.4 mg/dL    Calcium 7.1 (L) 8.7 - 10.5 mg/dL    Total Protein 4.5 (L) 6.0 - 8.4 g/dL    Albumin 2.5 (L) 3.5 - 5.2 g/dL    Total Bilirubin 0.9 0.1 - 1.0 mg/dL    Alkaline Phosphatase 80 55 - 135 U/L    AST 13 10 - 40 U/L    ALT 29 10 - 44 U/L    Anion Gap 11 8 - 16 mmol/L    eGFR if African American 11.5 (A) >60 mL/min/1.73 m^2    eGFR if non African American 9.9 (A) >60 mL/min/1.73 m^2   Magnesium    Collection Time: 03/29/19  1:42 AM   Result Value Ref Range    Magnesium 2.2 1.6 - 2.6 mg/dL   Phosphorus    Collection Time: 03/29/19  1:42 AM   Result Value Ref Range    Phosphorus 5.0 (H) 2.7 - 4.5 mg/dL   APTT    Collection Time: 03/29/19  1:42 AM   Result Value Ref Range    aPTT 56.9 (H) 21.0 - 32.0 sec   Protime-INR    Collection Time: 03/29/19  1:42 AM   Result Value Ref Range    Prothrombin Time 11.2 9.0 - 12.5 sec    INR 1.1 0.8 - 1.2   POCT glucose    Collection Time: 03/29/19  7:39 AM   Result Value Ref Range    POCT Glucose 116 (H) 70 - 110 mg/dL   CBC auto differential    Collection Time: 03/29/19  7:56 AM   Result Value Ref Range    WBC 8.05 3.90 - 12.70 K/uL    RBC 3.04 (L) 4.60 - 6.20 M/uL    Hemoglobin 9.6 (L) 14.0 - 18.0 g/dL    Hematocrit 29.4 (L) 40.0 - 54.0 %    MCV 97 82 - 98 fL    MCH 31.6 (H) 27.0 - 31.0 pg    MCHC 32.7 32.0 - 36.0 g/dL    RDW 15.2 (H) 11.5 - 14.5 %    Platelets 82 (L) 150 - 350 K/uL    MPV 11.0 9.2 - 12.9 fL    Immature Granulocytes 0.7 (H) 0.0 - 0.5 %    Gran # (ANC) 5.8 1.8 - 7.7 K/uL    Immature Grans (Abs) 0.06 (H) 0.00 - 0.04 K/uL    Lymph # 1.5 1.0 - 4.8 K/uL    Mono # 0.6 0.3 - 1.0 K/uL    Eos # 0.1 0.0 - 0.5 K/uL    Baso # 0.01 0.00 - 0.20 K/uL    nRBC 0 0 /100 WBC    Gran% 72.3 38.0 - 73.0 %    Lymph% 18.9 18.0 - 48.0 %    Mono% 7.0 4.0 - 15.0 %    Eosinophil%  1.0 0.0 - 8.0 %    Basophil% 0.1 0.0 - 1.9 %    Differential Method Automated    Ultrasound doppler arterial arm right (Cupid Only)    Collection Time: 03/29/19  9:38 AM   Result Value Ref Range    Upper arterial right arm subclavian sys max 74 cm/s    Upper arterial right arm axillary sys max 47 cm/s    Upper arterial right arm brachial sys max 100 cm/s    Upper arterial right arm radial sys max 86 cm/s    Upper arterial right arm ulnar sys max 52 cm/s         Diagnostic Results     Brain Imaging   CT Head 3/28/2019   There is evolving subacute to chronic appearing infarcts of the left thalamus, the left PCA territory involving the medial aspect of the left temporal lobe and left occipital lobes.  This is better shown with MRI from 2 days ago.  No associated acute hemorrhage.  No subdural collection.  No mass effect or midline shift.    The ventricles appear normal.  The overlying sulci are proportionally size.  There is fairly extensive periventricular deep white matter symmetrically decreased density consistent with microvascular changes probably normal for age.  There may be a small old lacune of the right putamen.    No fracture or lytic or blastic lesion.  The visualized mastoid sinuses and middle ears appear normal.  The paranasal sinuses appear normal.  There is rightward septal deviation.  The visualized orbits and globes appear normal.    MRI 3/26/19  New acute left PCA territory distribution infarctions involving the left paramedian occipital lobe, parahippocampal region and left splenium of the corpus callosum with additional acute evolving involving infarctions of the left thalamus.  No evidence of superimposed hemorrhage or hydrocephalus.  2. Generalized cerebral volume loss and findings suggestive of significant chronic microvascular ischemic change.      Under vessel imaging  Vessel Imaging   VAS US GIOVANY LEGS  Report Summary:  Impression:   Right Leg:  Color flow evaluation of the lower extremity  demonstrates fully compressible and patent veins. There is no evidence of venous thrombosis in the deep or superficial veins.  Incidental finding of significant reflux noted in the CFV.    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 There is no evidence of venous thrombosis in the remaining deep veins.    VAS US Carotid Bilaterally  Report Summary:  Impression:   RIGHT SIDE:  Velocities are suggestive of a 40-59% right ICA stenosis; however, it  visually appears to be in the 60-79% range.   Calcification of the right internal carotid artery, which denies complete visualization.   Heterogeneous plaque in the right common carotid artery.   Antegrade flow in the right vertebral artery.   LEFT SIDE:  1 - 39% left ICA stenosis.   Calcification of the left internal carotid artery.   Heterogeneous plaque in the left common carotid artery.   Retrograde flow in the left vertebral artery.    MRI 3/24 Brain Ischemic protocol   Punctate posterior left thalamic acute infarction.  No mass effect or hemorrhage.    Focal decreased signal within the proximal left PCA, suggestive of high-grade stenosis.    Moderate to high-grade narrowing of the intra cavernous/ supraclinoid ICAs bilaterally.    High-grade stenosis distal left vertebral artery.    CTA STROKE MULTIPHASE 3/24  No acute intracranial hemorrhage or mass effect.  No ischemic changes appreciated on CT.    Extensive calcified atherosclerotic disease resulting:    Left PCA with a high-grade stenosis.    High-grade (>70%) stenosis right proximal ICA and moderate (50-70%) stenosis left proximal ICA.    Moderate to high-grade stenosis of the intra cavernous/ supraclinoid ICAs bilaterally.    High-grade stenosis distal left vertebral artery.    Cardiac Imaging   TRANSTHORACIC ECHO (TTE) COMPLETE 3/23/2019  · Eccentric left ventricular hypertrophy.Normal left ventricular systolic  function. The estimated ejection fraction is 55%  · Severe left atrial enlargement.  · Indeterminate left ventricular diastolic function.  · Moderate aortic valve stenosis. Aortic valve area is 1.37 cm2; peak velocity is 2.78 m/s; mean gradient is 22.39 mmHg. Mild aortic regurgitation.  · Mild-to-moderate mitral regurgitation.  · Mild to moderate tricuspid regurgitation.  · The estimated PA systolic pressure is 41 mm Hg  · Severe right atrial enlargement.  · Normal right ventricular systolic function. The right ventricle is mildly dilated.  · Patent foramen ovale present with left to right shunting indicated by color flow Doppler.  · Normal central venous pressure (3 mm Hg).  · Pulmonary hypertension present.

## 2019-03-29 NOTE — PROGRESS NOTES
ABG requested; however, pt's left arm is extremely swollen and right arm has a fistula.  Per Dr. Curtis a VBG will sufice; waiting on a midline to be placed.

## 2019-03-29 NOTE — PT/OT/SLP PROGRESS
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    Recommendations:                 General Recommendations:  Dysphagia therapy and Speech/language therapy  Diet recommendations:  Regular, Liquid Diet Level: Nectar Thick   Aspiration Precautions: 1 bite/sip at a time, Feed only when awake/alert, HOB to 90 degrees, Meds crushed in puree, Small bites/sips and Standard aspiration precautions   General Precautions: Standard, aphasia, fall  Communication strategies:  provide increased time to answer    Subjective     Awake/alert    Pain/Comfort:  · Pain Rating 1: 0/10  · Pain Rating Post-Intervention 1: 0/10    Objective:     Has the patient been evaluated by SLP for swallowing?   Yes  Keep patient NPO? No   Current Respiratory Status: room air      Pt repositioned upright in bed for PO trials. NSG noted coughing with grits during breakfast this morning. SLP provided cracker x4, puree x3, thin liquids x2 and nectar thick liquids x6 for ongoing swallow assessment. He tolerated all trials with adequate oral clearance and timely swallow initiation. Slight wet vocal quality noted post both trials of thin liquids. Nectar thick liquids, puree, and solid trials were tolerated without overt clinical signs of airway compromise. Pt named 3/7 days of the week ind'ly increasing to 5/7 with cues. Unintelligible jargon noted throughout session. Pt was oriented to person, but required mod-max cues for place. Continue nectar thick liquids/regular diet at this time. Pt requires assistance with meals. Continue POC.     Assessment:     Micheal Hunt is a 78 y.o. male with an SLP diagnosis of Aphasia, Dysphagia and Cognitive-Linguistic Impairment.      Goals:   Multidisciplinary Problems     SLP Goals        Problem: SLP Goal    Goal Priority Disciplines Outcome   SLP Goal     SLP Ongoing (interventions  implemented as appropriate)   Description:  Goals to be met 4/1  1 pt will tolerate regular diet and thin liquids/met  2 pt will participate in speech lang eval/met  3.  [place] to time and place  4.  Respond to categorization tasks with 80% accuracy  5.  Assess functional reading and writing skills  6.  Respond to problem solving tasks with 80% accuracy                       Plan:     · Patient to be seen:  5 x/week   · Plan of Care expires:  04/24/19  · Plan of Care reviewed with:  patient, family   · SLP Follow-Up:  Yes       Discharge recommendations:  rehabilitation facility       Time Tracking:     SLP Treatment Date:   03/29/19  Speech Start Time:  1014  Speech Stop Time:  1030     Speech Total Time (min):  16 min    Billable Minutes: Speech Therapy Individual 8 and Treatment Swallowing Dysfunction 8    Linda Farley CCC-SLP  03/29/2019

## 2019-03-29 NOTE — PROGRESS NOTES
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   Interval MRI with expected extension of infarct within the left PCA territory involving thalamocapsular region as well as medial temporal lobe correlating to his worsening symptoms of thalamic aphasia and visual defect and worsening strength in R UE now 0/5 in RUE and 2/5 strength in RLE. Aphasia/Dysarthria worsening.     Antithrombotics for secondary stroke prevention: Antiplatelets: Aspirin: 81 mg daily    Statins for secondary stroke prevention and hyperlipidemia, if present:   Statins: Atorvastatin- 80 mg daily    Aggressive risk factor modification: HTN, DM, HLD, A-Fib, CAD     Rehab efforts: PT/OT/SLP to evaluate and treat, PM&R consult     Diagnostics ordered/pending: suggest NPO and further evaluation with SLP     VTE prophylaxis: Heparin gtt and warfarin     BP parameters: Infarct: No intervention, SBP <220        Dysarthria due to acute cerebrovascular accident (CVA)  - recommend patient be re-evaluated by SLP  - recommend NPO for diet     Forearm swelling  - tight swelling of R forearm  - suggest removal of arterial line  -  Suggest switch venous access - obtain access via IJ through single or multilumen line  - US performed - unable to see read  - orthopedics team does not feel this is compartment syndrome - appreciate their assistance    Paroxysmal atrial fibrillation  chadsvasc 8  stroke risk factor   on coreg for rate control   Heparin gtt with warfarin transition (consider higher dose as patient remains with INR 1.1)     ESRD (end stage renal disease) on dialysis  Continue scheduled HD   Patient is being followed by nephrology, appreciate their  assistance  Patient still makes urine - continue diuresis with torsemide  Patient tolerated HD    Type 2 diabetes mellitus, without long-term current use of insulin  Lab Results   Component Value Date    HGBA1C 8.3 (H) 03/21/2019     Sto risk factor   managed by primary team   Continue basal insulin with sliding scale per primary service (consider increasing basal with relatively high sliding scale demands)    Long term (current) use of anticoagulants  - continue heparin gtt with asa 81mg   - transition to heparin, INR 1.1     Benign prostatic hyperplasia without lower urinary tract symptoms  - continue flomax and finasteride    Gout, arthritis  - continue allopurinol, patient would likely benefit to change dosing to post dialysis days only     CAD (coronary artery disease), 2V CABG 2007  Stoke risk factor   No ongoing signs/symptoms of ischemia  Continue single Aspirin 81mg and heparin gtt with transition to warfarin (INR 1.1)   Continue coreg 25 bid and losartan 100mg  Continue high intensity statin        Hyperlipidemia, baseline   Lab Results   Component Value Date    LDLCALC 46.4 (L) 07/26/2018   Stoke risk factor   Continue atorvastatin 80 mg       Essential hypertension  Ok with hypertension in the setting of flow dependent stroke symptoms   Continue carvedilol, losartan and torsemide      Carotid artery stenosis  - noted on both CTA and on ultrasound of carotids  - patient evaluated by vascular who agree with maximal medical management         Admitted to hospital medicine for unstable angina eval, vascular neurology consulted for right sided weakness, facial droop and dysarthria, MRI showed L PCA infarction, not candidate for TPA ( symptoms duration >4 hours)patient symptoms improved with laying flat, admitted to neuro critical care. Patient with known PCA infarct on L with undulating symptom pattern with modification to level of recumbency. Patient restarted on heparin gtt. Underwent US of LE and  of carotid with evidence of <50% L ICA stenosis and 60-70% R ICA stenosis, heavily calcified on CTA, additionally L vertebral also heavily calcified. Patient tolerated HD. Neuro deficit appear to be more severe on 3/27 with decreasing right arm strength now 2/5 and with worsening dysarthria/aphasia. Patient with gross swelling of R forearm - tense, pulses intact.   03/29/2019 patient with worsening of neurological deficit, unable to move right upper extremity, patient with pain on passive movement of right fingers exhibited by facial grimace as verbal response to pain/sensation/light touch no longer reliable, aphasia/dysarthria worsening, RLE with 2/5 strength today, patient evaluated by orthopedic surgery regarding R forearm yesterday and today and feel that this is not compartment syndrome and will continue to monitor, distal pulses remain palpable    STROKE DOCUMENTATION        NIH Scale:  1a. Level of Consciousness: 1-->Not alert, but arousable by minor stimulation to obey, answer, or respond  1b. LOC Questions: 1-->Answers one question correctly  1c. LOC Commands: 0-->Performs both tasks correctly  2. Best Gaze: 0-->Normal  3. Visual: 1-->Partial hemianopia  4. Facial Palsy: 1-->Minor paralysis (flattened nasolabial fold, asymmetry on smiling)  5a. Motor Arm, Left: 0-->No drift, limb holds 90 (or 45) degrees for full 10 secs  5b. Motor Arm, Right: 4-->No movement  6a. Motor Leg, Left: 0-->No drift, leg holds 30 degree position for full 5 secs  6b. Motor Leg, Right: 2-->Some effort against gravity, leg falls to bed by 5 secs, but has some effort against gravity  7. Limb Ataxia: 0-->Absent  8. Sensory: 2-->Severe to total sensory loss, patient is not aware of being touched in the face, arm, and leg  9. Best Language: 2-->Severe aphasia, all communication is through fragmentary expression, great need for inference, questioning, and guessing by the listener. Range of information that can be exchanged is limited,  listener carries burden of. . . (see row details)  10. Dysarthria: 2-->Severe dysarthria, patients speech is so slurred as to be unintelligible in the absence of or out of proportion to any dysphasia, or is mute/anarthric  11. Extinction and Inattention (formerly Neglect): 0-->No abnormality  Total (NIH Stroke Scale): 16       Modified Steve    [place] Coma Scale:14   ABCD2 Score:    [id number] Score:8  HAS -BLED Score:   ICH Score:   Hunt & Sharp Classification:      Hemorrhagic change of an Ischemic Stroke: Does this patient have an ischemic stroke with hemorrhagic changes? No     Neurologic Chief Complaint: right sided weakness    Subjective:     Interval History: Patient is seen for follow-up neurological assessment and treatment recommendations: see hospital course for interval history    HPI, Past Medical, Family, and Social History remains the same as documented in the initial encounter.     Review of Systems   Unable to perform ROS: Mental status change   Constitutional: Negative for chills, fatigue and fever.   HENT: Negative for postnasal drip and sore throat.    Eyes: Negative.    Respiratory: Negative for chest tightness and shortness of breath.    Cardiovascular: Negative for chest pain, palpitations and leg swelling.   Gastrointestinal: Negative for abdominal pain, blood in stool, constipation and nausea.   Endocrine: Negative.    Genitourinary: Negative for dysuria.   Musculoskeletal: Negative for arthralgias, back pain and joint swelling.   Skin: Negative.    Allergic/Immunologic: Negative.    Neurological: Positive for dizziness, speech difficulty and weakness. Negative for light-headedness and headaches.   Psychiatric/Behavioral: Positive for confusion. Negative for dysphoric mood. The patient is not nervous/anxious and is not hyperactive.      Scheduled Meds:   sodium chloride 0.9%   Intravenous Once    allopurinol  100 mg Oral Daily    atorvastatin  80 mg Oral Daily    carvedilol  50 mg  Oral BID WM    finasteride  5 mg Oral Daily    gabapentin  300 mg Oral QHS    insulin detemir U-100  8 Units Subcutaneous Daily    losartan  100 mg Oral QHS    pantoprazole  40 mg Oral Daily    polyethylene glycol  17 g Oral Daily    senna-docusate 8.6-50 mg  1 tablet Oral BID    tamsulosin  1 capsule Oral Daily    torsemide  20 mg Oral BID    warfarin  5 mg Oral Daily     Continuous Infusions:   heparin (porcine) in D5W 14 Units/kg/hr (03/29/19 0800)     PRN Meds:sodium chloride 0.9%, acetaminophen, albuterol-ipratropium, dextrose 50%, dextrose 50%, glucagon (human recombinant), glucose, glucose, heparin (PORCINE), heparin (PORCINE), insulin aspart U-100, nitroGLYCERIN, ondansetron    Objective:     Vital Signs (Most Recent):  Temp: 98.8 °F (37.1 °C) (03/29/19 0300)  Pulse: 66 (03/29/19 0800)  Resp: (!) 9 (03/29/19 0800)  BP: (!) 169/78 (03/29/19 0701)  SpO2: 97 % (03/29/19 0800)  BP Location: Left leg    Vital Signs Range (Last 24H):  Temp:  [98.2 °F (36.8 °C)-98.8 °F (37.1 °C)]   Pulse:  [64-79]   Resp:  [9-25]   BP: (126-185)/(59-85)   SpO2:  [93 %-100 %]   BP Location: Left leg    Physical Exam   Constitutional: He is oriented to person, place, and time. He appears well-developed and well-nourished. He is cooperative.  Non-toxic appearance. He does not have a sickly appearance. He does not appear ill. No distress.   HENT:   Head: Normocephalic and atraumatic.   Eyes: Pupils are equal, round, and reactive to light.   Cardiovascular: S1 normal, S2 normal and intact distal pulses. An irregularly irregular rhythm present.   Murmur heard.  Pulses:       Radial pulses are 2+ on the right side, and 2+ on the left side.        Dorsalis pedis pulses are 2+ on the right side, and 2+ on the left side.   Pulmonary/Chest: He has decreased breath sounds. He has rales.   Abdominal: Soft.   Musculoskeletal: He exhibits no edema.   Neurological: He is alert and oriented to person, place, and time. No cranial nerve  deficit or sensory deficit.   0/5 strength on R UE - worse d/d  1/5 strength R LE - worse d/d - significant change  5/5 on left  Some evidence of hemispatial neglect   Dysarthria and asphasia worse d/d    Skin: Skin is warm and dry.   Tense R forearm compartment  Unable to properly assess sensation for parasthesia 2/2 stroke - patient responses unreliable  Patient grimacing with passive movement of fingers  Pulses remain present distally  Upper arm with flaccid compartments    Nursing note and vitals reviewed.      Neurological Exam:   LOC: less alert d/d with increasing confusion  Language: + aphasia, dysarthria challenges in seeing/expressing words/letters in R field of vision suggestive of R superior quadrantanopsia   Pupils (CN II, III): PERRL  Motor in physical exam - worsening R sided function d/d     Laboratory:  Recent Results (from the past 24 hour(s))   POCT glucose    Collection Time: 03/28/19 11:46 AM   Result Value Ref Range    POCT Glucose 216 (H) 70 - 110 mg/dL   POCT glucose    Collection Time: 03/28/19  5:48 PM   Result Value Ref Range    POCT Glucose 137 (H) 70 - 110 mg/dL   CBC auto differential    Collection Time: 03/28/19  8:14 PM   Result Value Ref Range    WBC 9.86 3.90 - 12.70 K/uL    RBC 2.98 (L) 4.60 - 6.20 M/uL    Hemoglobin 9.6 (L) 14.0 - 18.0 g/dL    Hematocrit 29.5 (L) 40.0 - 54.0 %    MCV 99 (H) 82 - 98 fL    MCH 32.2 (H) 27.0 - 31.0 pg    MCHC 32.5 32.0 - 36.0 g/dL    RDW 15.3 (H) 11.5 - 14.5 %    Platelets 86 (L) 150 - 350 K/uL    MPV 10.3 9.2 - 12.9 fL    Immature Granulocytes 0.9 (H) 0.0 - 0.5 %    Gran # (ANC) 7.5 1.8 - 7.7 K/uL    Immature Grans (Abs) 0.09 (H) 0.00 - 0.04 K/uL    Lymph # 1.5 1.0 - 4.8 K/uL    Mono # 0.7 0.3 - 1.0 K/uL    Eos # 0.1 0.0 - 0.5 K/uL    Baso # 0.01 0.00 - 0.20 K/uL    nRBC 0 0 /100 WBC    Gran% 76.1 (H) 38.0 - 73.0 %    Lymph% 15.2 (L) 18.0 - 48.0 %    Mono% 7.2 4.0 - 15.0 %    Eosinophil% 0.5 0.0 - 8.0 %    Basophil% 0.1 0.0 - 1.9 %    Differential  Method Automated    APTT    Collection Time: 03/28/19  8:14 PM   Result Value Ref Range    aPTT 52.2 (H) 21.0 - 32.0 sec   Protime-INR    Collection Time: 03/28/19  8:14 PM   Result Value Ref Range    Prothrombin Time 11.2 9.0 - 12.5 sec    INR 1.1 0.8 - 1.2   POCT glucose    Collection Time: 03/28/19 10:50 PM   Result Value Ref Range    POCT Glucose 131 (H) 70 - 110 mg/dL   APTT    Collection Time: 03/29/19  1:42 AM   Result Value Ref Range    aPTT 56.9 (H) 21.0 - 32.0 sec   APTT    Collection Time: 03/29/19  1:42 AM   Result Value Ref Range    aPTT 56.9 (H) 21.0 - 32.0 sec   Comprehensive metabolic panel    Collection Time: 03/29/19  1:42 AM   Result Value Ref Range    Sodium 140 136 - 145 mmol/L    Potassium 4.2 3.5 - 5.1 mmol/L    Chloride 106 95 - 110 mmol/L    CO2 23 23 - 29 mmol/L    Glucose 100 70 - 110 mg/dL    BUN, Bld 56 (H) 8 - 23 mg/dL    Creatinine 5.1 (H) 0.5 - 1.4 mg/dL    Calcium 7.1 (L) 8.7 - 10.5 mg/dL    Total Protein 4.5 (L) 6.0 - 8.4 g/dL    Albumin 2.5 (L) 3.5 - 5.2 g/dL    Total Bilirubin 0.9 0.1 - 1.0 mg/dL    Alkaline Phosphatase 80 55 - 135 U/L    AST 13 10 - 40 U/L    ALT 29 10 - 44 U/L    Anion Gap 11 8 - 16 mmol/L    eGFR if African American 11.5 (A) >60 mL/min/1.73 m^2    eGFR if non African American 9.9 (A) >60 mL/min/1.73 m^2   Magnesium    Collection Time: 03/29/19  1:42 AM   Result Value Ref Range    Magnesium 2.2 1.6 - 2.6 mg/dL   Phosphorus    Collection Time: 03/29/19  1:42 AM   Result Value Ref Range    Phosphorus 5.0 (H) 2.7 - 4.5 mg/dL   APTT    Collection Time: 03/29/19  1:42 AM   Result Value Ref Range    aPTT 56.9 (H) 21.0 - 32.0 sec   Protime-INR    Collection Time: 03/29/19  1:42 AM   Result Value Ref Range    Prothrombin Time 11.2 9.0 - 12.5 sec    INR 1.1 0.8 - 1.2   CBC auto differential    Collection Time: 03/29/19  1:42 AM   Result Value Ref Range    WBC 6.88 3.90 - 12.70 K/uL    RBC 2.71 (L) 4.60 - 6.20 M/uL    Hemoglobin 8.8 (L) 14.0 - 18.0 g/dL    Hematocrit 26.2  (L) 40.0 - 54.0 %    MCV 97 82 - 98 fL    MCH 32.5 (H) 27.0 - 31.0 pg    MCHC 33.6 32.0 - 36.0 g/dL    RDW 15.4 (H) 11.5 - 14.5 %    Platelets 86 (L) 150 - 350 K/uL    MPV 10.3 9.2 - 12.9 fL    Immature Granulocytes 0.7 (H) 0.0 - 0.5 %    Gran # (ANC) 5.0 1.8 - 7.7 K/uL    Immature Grans (Abs) 0.05 (H) 0.00 - 0.04 K/uL    Lymph # 1.3 1.0 - 4.8 K/uL    Mono # 0.4 0.3 - 1.0 K/uL    Eos # 0.1 0.0 - 0.5 K/uL    Baso # 0.01 0.00 - 0.20 K/uL    nRBC 0 0 /100 WBC    Gran% 72.4 38.0 - 73.0 %    Lymph% 19.5 18.0 - 48.0 %    Mono% 6.3 4.0 - 15.0 %    Eosinophil% 1.0 0.0 - 8.0 %    Basophil% 0.1 0.0 - 1.9 %    Differential Method Automated    APTT    Collection Time: 03/29/19  1:42 AM   Result Value Ref Range    aPTT 56.9 (H) 21.0 - 32.0 sec   CBC auto differential    Collection Time: 03/29/19  1:42 AM   Result Value Ref Range    WBC 6.88 3.90 - 12.70 K/uL    RBC 2.71 (L) 4.60 - 6.20 M/uL    Hemoglobin 8.8 (L) 14.0 - 18.0 g/dL    Hematocrit 26.2 (L) 40.0 - 54.0 %    MCV 97 82 - 98 fL    MCH 32.5 (H) 27.0 - 31.0 pg    MCHC 33.6 32.0 - 36.0 g/dL    RDW 15.4 (H) 11.5 - 14.5 %    Platelets 86 (L) 150 - 350 K/uL    MPV 10.3 9.2 - 12.9 fL    Immature Granulocytes 0.7 (H) 0.0 - 0.5 %    Gran # (ANC) 5.0 1.8 - 7.7 K/uL    Immature Grans (Abs) 0.05 (H) 0.00 - 0.04 K/uL    Lymph # 1.3 1.0 - 4.8 K/uL    Mono # 0.4 0.3 - 1.0 K/uL    Eos # 0.1 0.0 - 0.5 K/uL    Baso # 0.01 0.00 - 0.20 K/uL    nRBC 0 0 /100 WBC    Gran% 72.4 38.0 - 73.0 %    Lymph% 19.5 18.0 - 48.0 %    Mono% 6.3 4.0 - 15.0 %    Eosinophil% 1.0 0.0 - 8.0 %    Basophil% 0.1 0.0 - 1.9 %    Differential Method Automated    CBC auto differential    Collection Time: 03/29/19  1:42 AM   Result Value Ref Range    WBC 6.88 3.90 - 12.70 K/uL    RBC 2.71 (L) 4.60 - 6.20 M/uL    Hemoglobin 8.8 (L) 14.0 - 18.0 g/dL    Hematocrit 26.2 (L) 40.0 - 54.0 %    MCV 97 82 - 98 fL    MCH 32.5 (H) 27.0 - 31.0 pg    MCHC 33.6 32.0 - 36.0 g/dL    RDW 15.4 (H) 11.5 - 14.5 %    Platelets 86 (L) 150  - 350 K/uL    MPV 10.3 9.2 - 12.9 fL    Immature Granulocytes 0.7 (H) 0.0 - 0.5 %    Gran # (ANC) 5.0 1.8 - 7.7 K/uL    Immature Grans (Abs) 0.05 (H) 0.00 - 0.04 K/uL    Lymph # 1.3 1.0 - 4.8 K/uL    Mono # 0.4 0.3 - 1.0 K/uL    Eos # 0.1 0.0 - 0.5 K/uL    Baso # 0.01 0.00 - 0.20 K/uL    nRBC 0 0 /100 WBC    Gran% 72.4 38.0 - 73.0 %    Lymph% 19.5 18.0 - 48.0 %    Mono% 6.3 4.0 - 15.0 %    Eosinophil% 1.0 0.0 - 8.0 %    Basophil% 0.1 0.0 - 1.9 %    Differential Method Automated    Comprehensive metabolic panel    Collection Time: 03/29/19  1:42 AM   Result Value Ref Range    Sodium 140 136 - 145 mmol/L    Potassium 4.2 3.5 - 5.1 mmol/L    Chloride 106 95 - 110 mmol/L    CO2 23 23 - 29 mmol/L    Glucose 100 70 - 110 mg/dL    BUN, Bld 56 (H) 8 - 23 mg/dL    Creatinine 5.1 (H) 0.5 - 1.4 mg/dL    Calcium 7.1 (L) 8.7 - 10.5 mg/dL    Total Protein 4.5 (L) 6.0 - 8.4 g/dL    Albumin 2.5 (L) 3.5 - 5.2 g/dL    Total Bilirubin 0.9 0.1 - 1.0 mg/dL    Alkaline Phosphatase 80 55 - 135 U/L    AST 13 10 - 40 U/L    ALT 29 10 - 44 U/L    Anion Gap 11 8 - 16 mmol/L    eGFR if African American 11.5 (A) >60 mL/min/1.73 m^2    eGFR if non African American 9.9 (A) >60 mL/min/1.73 m^2   Magnesium    Collection Time: 03/29/19  1:42 AM   Result Value Ref Range    Magnesium 2.2 1.6 - 2.6 mg/dL   Phosphorus    Collection Time: 03/29/19  1:42 AM   Result Value Ref Range    Phosphorus 5.0 (H) 2.7 - 4.5 mg/dL   APTT    Collection Time: 03/29/19  1:42 AM   Result Value Ref Range    aPTT 56.9 (H) 21.0 - 32.0 sec   Protime-INR    Collection Time: 03/29/19  1:42 AM   Result Value Ref Range    Prothrombin Time 11.2 9.0 - 12.5 sec    INR 1.1 0.8 - 1.2   POCT glucose    Collection Time: 03/29/19  7:39 AM   Result Value Ref Range    POCT Glucose 116 (H) 70 - 110 mg/dL   CBC auto differential    Collection Time: 03/29/19  7:56 AM   Result Value Ref Range    WBC 8.05 3.90 - 12.70 K/uL    RBC 3.04 (L) 4.60 - 6.20 M/uL    Hemoglobin 9.6 (L) 14.0 - 18.0 g/dL     Hematocrit 29.4 (L) 40.0 - 54.0 %    MCV 97 82 - 98 fL    MCH 31.6 (H) 27.0 - 31.0 pg    MCHC 32.7 32.0 - 36.0 g/dL    RDW 15.2 (H) 11.5 - 14.5 %    Platelets 82 (L) 150 - 350 K/uL    MPV 11.0 9.2 - 12.9 fL    Immature Granulocytes 0.7 (H) 0.0 - 0.5 %    Gran # (ANC) 5.8 1.8 - 7.7 K/uL    Immature Grans (Abs) 0.06 (H) 0.00 - 0.04 K/uL    Lymph # 1.5 1.0 - 4.8 K/uL    Mono # 0.6 0.3 - 1.0 K/uL    Eos # 0.1 0.0 - 0.5 K/uL    Baso # 0.01 0.00 - 0.20 K/uL    nRBC 0 0 /100 WBC    Gran% 72.3 38.0 - 73.0 %    Lymph% 18.9 18.0 - 48.0 %    Mono% 7.0 4.0 - 15.0 %    Eosinophil% 1.0 0.0 - 8.0 %    Basophil% 0.1 0.0 - 1.9 %    Differential Method Automated    Ultrasound doppler arterial arm right (Cupid Only)    Collection Time: 03/29/19  9:38 AM   Result Value Ref Range    Upper arterial right arm subclavian sys max 74 cm/s    Upper arterial right arm axillary sys max 47 cm/s    Upper arterial right arm brachial sys max 100 cm/s    Upper arterial right arm radial sys max 86 cm/s    Upper arterial right arm ulnar sys max 52 cm/s         Diagnostic Results     Brain Imaging   CT Head 3/28/2019   There is evolving subacute to chronic appearing infarcts of the left thalamus, the left PCA territory involving the medial aspect of the left temporal lobe and left occipital lobes.  This is better shown with MRI from 2 days ago.  No associated acute hemorrhage.  No subdural collection.  No mass effect or midline shift.    The ventricles appear normal.  The overlying sulci are proportionally size.  There is fairly extensive periventricular deep white matter symmetrically decreased density consistent with microvascular changes probably normal for age.  There may be a small old lacune of the right putamen.    No fracture or lytic or blastic lesion.  The visualized mastoid sinuses and middle ears appear normal.  The paranasal sinuses appear normal.  There is rightward septal deviation.  The visualized orbits and globes appear  normal.    MRI 3/26/19  New acute left PCA territory distribution infarctions involving the left paramedian occipital lobe, parahippocampal region and left splenium of the corpus callosum with additional acute evolving involving infarctions of the left thalamus.  No evidence of superimposed hemorrhage or hydrocephalus.  2. Generalized cerebral volume loss and findings suggestive of significant chronic microvascular ischemic change.      Under vessel imaging  Vessel Imaging   VAS US GIOVANY LEGS  Report Summary:  Impression:   Right Leg:  Color flow evaluation of the lower extremity demonstrates fully compressible and patent veins. There is no evidence of venous thrombosis in the deep or superficial veins.  Incidental finding of significant reflux noted in the CFV.    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 There is no evidence of venous thrombosis in the remaining deep veins.    VAS US Carotid Bilaterally  Report Summary:  Impression:   RIGHT SIDE:  Velocities are suggestive of a 40-59% right ICA stenosis; however, it  visually appears to be in the 60-79% range.   Calcification of the right internal carotid artery, which denies complete visualization.   Heterogeneous plaque in the right common carotid artery.   Antegrade flow in the right vertebral artery.   LEFT SIDE:  1 - 39% left ICA stenosis.   Calcification of the left internal carotid artery.   Heterogeneous plaque in the left common carotid artery.   Retrograde flow in the left vertebral artery.    MRI 3/24 Brain Ischemic protocol   Punctate posterior left thalamic acute infarction.  No mass effect or hemorrhage.    Focal decreased signal within the proximal left PCA, suggestive of high-grade stenosis.    Moderate to high-grade narrowing of the intra cavernous/ supraclinoid ICAs bilaterally.    High-grade stenosis distal left vertebral artery.    CTA STROKE  MULTIPHASE 3/24  No acute intracranial hemorrhage or mass effect.  No ischemic changes appreciated on CT.    Extensive calcified atherosclerotic disease resulting:    Left PCA with a high-grade stenosis.    High-grade (>70%) stenosis right proximal ICA and moderate (50-70%) stenosis left proximal ICA.    Moderate to high-grade stenosis of the intra cavernous/ supraclinoid ICAs bilaterally.    High-grade stenosis distal left vertebral artery.    Cardiac Imaging   TRANSTHORACIC ECHO (TTE) COMPLETE 3/23/2019  · Eccentric left ventricular hypertrophy.Normal left ventricular systolic function. The estimated ejection fraction is 55%  · Severe left atrial enlargement.  · Indeterminate left ventricular diastolic function.  · Moderate aortic valve stenosis. Aortic valve area is 1.37 cm2; peak velocity is 2.78 m/s; mean gradient is 22.39 mmHg. Mild aortic regurgitation.  · Mild-to-moderate mitral regurgitation.  · Mild to moderate tricuspid regurgitation.  · The estimated PA systolic pressure is 41 mm Hg  · Severe right atrial enlargement.  · Normal right ventricular systolic function. The right ventricle is mildly dilated.  · Patent foramen ovale present with left to right shunting indicated by color flow Doppler.  · Normal central venous pressure (3 mm Hg).  · Pulmonary hypertension present.        Thiago Pickard MD  [facility]  Department of Vascular Neurology   [facility]

## 2019-03-29 NOTE — ASSESSMENT & PLAN NOTE
-- Fluctuating mental status.  -- Similar episode 2 nights ago with improvement by the next morning. Sundowning?  -- EEG with generalized slowing and no electrographic seizures recorded.

## 2019-03-29 NOTE — ASSESSMENT & PLAN NOTE
Micheal Hunt is a 78 y.o. male with extravasation from arterial line in RUE yesterday, compartments remain swollen and full this am, but are soft and compressible.   - No concern for compartment syndrome at this moment as patient reports no pain with passive extension of digits, no numbness or tingling, compartments are soft and compressible.  however, this could change.  - Keep arm iced and elevated to decrease swelling.   -Please call immediately if swelling worsens.

## 2019-03-29 NOTE — ASSESSMENT & PLAN NOTE
-- Noted on the R groin  -- Noted on 3/28 with interval expansion  -- If hematoma starts to expand, will get US to look for pseudoaneurysm and possibly hold heparin gtt.

## 2019-03-29 NOTE — ASSESSMENT & PLAN NOTE
-- Transferred from [facility] for possible C but patient developed a L PCA infarct shortly after admission.  -- Cardiology consulted on arrival, appreciate recs.  -- No intervention for now, recommending maximal medical therapy  -- On heparin gtt for Afib. Started bridge to coumadin on 3/27.  -- Coreg decreased back to 25 BID given persistent hypotension on 50 BID dosing which correlated with worsening RSW.   -- ASA held temporarily due to decrease in platelets. No concern for HIT as of now. If continues to drop, will hold heparin gtt.  -- Continue Atorvastatin 80

## 2019-03-29 NOTE — PROGRESS NOTES
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  No acute events overnight.  Pain controlled.  Denies numbness or tingling in extremity    Review of patient's allergies indicates:   Allergen Reactions    Ace inhibitors Other (See Comments)     Cough    Arb-angiotensin receptor antagonist Itching    Eplerenone Other (See Comments)     Marked bradycardia, 40, tiredness and weakness      Sulfa (sulfonamide antibiotics) Itching     Patient says this was 10 years ago and doesn't remember what happened       Current Facility-Administered Medications   Medication    0.9%  NaCl infusion    0.9%  NaCl infusion    acetaminophen tablet 650 mg    albuterol-ipratropium 2.5 mg-0.5 mg/3 mL nebulizer solution 3 mL    allopurinol tablet 100 mg    aspirin EC tablet 81 mg    atorvastatin tablet 80 mg    carvedilol tablet 50 mg    dextrose 50% injection 12.5 g    dextrose 50% injection 25 g    finasteride tablet 5 mg    gabapentin capsule 300 mg    glucagon (human recombinant) injection 1 mg    glucose chewable tablet 16 g    glucose chewable tablet 24 g    heparin 25,000 units in dextrose 5% (100 units/ml) IV bolus from bag - ADDITIONAL PRN BOLUS - 30 units/kg (max bolus 4000 units)    heparin 25,000 units in dextrose 5% (100 units/ml) IV bolus from bag - ADDITIONAL PRN BOLUS - 60 units/kg (max bolus 4000 units)    heparin 25,000 units in dextrose 5% 250 mL (100 units/mL) infusion LOW  "INTENSITY nomogram - [facility]    insulin aspart U-100 pen 1-10 Units    insulin detemir U-100 pen 8 Units    losartan tablet 100 mg    nitroGLYCERIN SL tablet 0.4 mg    ondansetron tablet 8 mg    pantoprazole EC tablet 40 mg    polyethylene glycol packet 17 g    senna-docusate 8.6-50 mg per tablet 1 tablet    tamsulosin 24 hr capsule 0.4 mg    torsemide tablet 20 mg    warfarin (COUMADIN) tablet 5 mg     Objective:     Vital Signs (Most Recent):  Temp: 98.8 °F (37.1 °C) (03/29/19 0300)  Pulse: 69 (03/29/19 0500)  Resp: 19 (03/29/19 0500)  BP: (!) 156/64 (03/29/19 0500)  SpO2: 98 % (03/29/19 0500) Vital Signs (24h Range):  Temp:  [98 °F (36.7 °C)-98.8 °F (37.1 °C)] 98.8 °F (37.1 °C)  Pulse:  [64-83] 69  Resp:  [12-25] 19  SpO2:  [94 %-100 %] 98 %  BP: (126-185)/(59-85) 156/64  Arterial Line BP: (160-167)/(74-79) 161/74     Weight: 106.6 kg (235 lb 0.2 oz)  Height: 6' 1" (185.4 cm)  Body mass index is 31.01 kg/m².      Intake/Output Summary (Last 24 hours) at 3/29/2019 0555  Last data filed at 3/29/2019 0501  Gross per 24 hour   Intake 294.83 ml   Output 305 ml   Net -10.17 ml       Ortho/SPM Exam  RIGHT UPPER EXTREMITY:      INSPECTION  - Skin intact, diffuse swelling to right forearm and hand. Peripheral IV in place in Antecubital fossa.   PALPATION  - Compartments in hand in forearm all full, but are soft and compressible on evaluation at 0545 this am. RANGE OF MOTION  - No pain with passive stretch of digits per patient  NEUROVASCULAR  - AIN/PIN/Radial/Median/Ulnar Nerves assessed in isolation without deficit  - SILT throughout M/R/U distribution  - Radial & Ulnar arteries palpated 1+  - Capillary Refill <3s      Significant Labs:   BMP:   Recent Labs   Lab 03/29/19  0142     100     140   K 4.2  4.2     106   CO2 23  23   BUN 56*  56*   CREATININE 5.1*  5.1*   CALCIUM 7.1*  7.1*   MG 2.2  2.2     CBC:   Recent Labs   Lab 03/28/19  0033 03/28/19 2014 03/29/19  0142   WBC 7.25 " 9.86 6.88  6.88  6.88   HGB 10.3* 9.6* 8.8*  8.8*  8.8*   HCT 29.9* 29.5* 26.2*  26.2*  26.2*   * 86* 86*  86*  86*     All pertinent labs within the past 24 hours have been reviewed.    Significant Imaging: I have reviewed all pertinent imaging results/findings.    Assessment/Plan:     Forearm swelling  Micheal Hunt is a 78 y.o. male with extravasation from arterial line in RUE yesterday, compartments remain swollen and full this am, but are soft and compressible.   - No concern for compartment syndrome at this moment as patient reports no pain with passive extension of digits, no numbness or tingling, compartments are soft and compressible.  however, this could change.  - Keep arm iced and elevated to decrease swelling.   -Please call immediately if swelling worsens.              Beny Mccray MD  Orthopedics  [facility]

## 2019-03-29 NOTE — ASSESSMENT & PLAN NOTE
- tight swelling of R forearm  - suggest removal of arterial line  -  Suggest switch venous access - obtain access via IJ through single or multilumen line  - US performed - unable to see read  - orthopedics team does not feel this is compartment syndrome - appreciate their assistance

## 2019-03-29 NOTE — ASSESSMENT & PLAN NOTE
-- Acutely swollen RUE noted on 3/28  -- Omaira and IV removed with partial improvement of swelling.  -- No concern for compartment syndrome per Orthopedics. Appreciate recs.  -- US RUE pending.  -- Keep limb elevated.

## 2019-03-29 NOTE — CARE UPDATE
Patient was seen and evaluated this am. Compartments remained swollen RUE. But were compressible and soft. Patient denied pain with passive extension of digits, denied numbness or tingling and had 1+ palpable radial pulses. Should anything change regarding the exam, please call orthopedics immediately. Orthopedics will sign off but are available should anything change regarding the patient's status.

## 2019-03-29 NOTE — PLAN OF CARE
Problem: Adult Inpatient Plan of Care  Goal: Plan of Care Review    Outcome: Ongoing (interventions implemented as appropriate)  POC reviewed with pt at 0500. Pt is unable to verbalize understanding r/t aphasia and disorientation x4. Heparin gtt continued and has remained therapeutic. Tegaderm placed over site of hematoma on scrotum and outlined area with sharpie. Questions and concerns addressed with patient's son. No acute events overnight. Pt progressing toward goals. Will continue to monitor. See flowsheets for full assessment and VS info

## 2019-03-29 NOTE — SUBJECTIVE & OBJECTIVE
Review of Systems   Unable to perform ROS: Other   Respiratory: Negative for shortness of breath.    Neurological: Positive for facial asymmetry, speech difficulty and weakness.       Objective:     Vitals:  Temp: 98.4 °F (36.9 °C)  Pulse: 67  Rhythm: atrial rhythm  BP: (!) 124/58  MAP (mmHg): 84  Resp: 16  SpO2: 97 %  O2 Device (Oxygen Therapy): room air    Temp  Min: 98.4 °F (36.9 °C)  Max: 98.8 °F (37.1 °C)  Pulse  Min: 66  Max: 79  BP  Min: 124/58  Max: 185/73  MAP (mmHg)  Min: 84  Max: 112  Resp  Min: 9  Max: 25  SpO2  Min: 93 %  Max: 100 %  Oxygen Concentration (%)  Min: 28  Max: 28    03/28 0701 - 03/29 0700  In: 289.8 [I.V.:289.8]  Out: 305 [Urine:305]   Unmeasured Output  Urine Occurrence: 1  Stool Occurrence: 1  Pad Count: 2       Physical Exam   Constitutional: No distress.   Neurological: He is alert.   E4V4M6  Alert. Oriented to person only.  Dysarthric+. Follows simple commands.  Pupils reactive bilaterally.  R facial droop  RUE - 0/5  RLE - 1/5  LUE - 5/5  LLE - 3/5    NIHSS - 16   Nursing note and vitals reviewed.      Medications:  Continuous  heparin (porcine) in D5W Last Rate: 14 Units/kg/hr (03/29/19 1100)   Scheduled  sodium chloride 0.9%  Once   allopurinol 100 mg Daily   atorvastatin 80 mg Daily   carvedilol 25 mg BID WM   finasteride 5 mg Daily   gabapentin 300 mg QHS   insulin detemir U-100 8 Units Daily   losartan 100 mg QHS   pantoprazole 40 mg Daily   polyethylene glycol 17 g Daily   senna-docusate 8.6-50 mg 1 tablet BID   tamsulosin 1 capsule Daily   torsemide 20 mg BID   warfarin 5 mg Daily   PRN  sodium chloride 0.9%  PRN   acetaminophen 650 mg Q6H PRN   albuterol-ipratropium 3 mL Q4H PRN   dextrose 50% 12.5 g PRN   dextrose 50% 25 g PRN   glucagon (human recombinant) 1 mg PRN   glucose 16 g PRN   glucose 24 g PRN   heparin (PORCINE) 30 Units/kg (Adjusted) PRN   heparin (PORCINE) 44.2 Units/kg (Adjusted) PRN   insulin aspart U-100 1-10 Units QID (AC + HS) PRN   nitroGLYCERIN 0.4 mg  Q5 Min PRN   ondansetron 8 mg Q6H PRN     Today I personally reviewed pertinent medications, lines/drains/airways, imaging, cardiology results, laboratory results, microbiology results, notably:    Diet  Diet Dysphagia Mechanical Soft (IDDSI Level 5) [facility]; Cardiac (Low Na/Chol), Coumadin Restriction, Renal; Nectar Thick  Diet Dysphagia Mechanical Soft (IDDSI Level 5) [facility]; Cardiac (Low Na/Chol), Coumadin Restriction, Renal; Nectar Thick

## 2019-03-29 NOTE — ASSESSMENT & PLAN NOTE
chadsvasc 8  stroke risk factor   on coreg for rate control   Heparin gtt with warfarin transition (consider higher dose as patient remains with INR 1.1)

## 2019-03-29 NOTE — PROCEDURES
Routine EEG Report      Micheal Hunt  [id number]  1940    DATE OF SERVICE: 3/28/2019  REASON FOR CONSULT:  78-year-old man admitted with acute infarcts now with fluctuating mental status.  Evaluate for evidence of epileptiform activity.    METHODOLOGY   Electroencephalographic (EEG) recording is with electrodes placed according to the International 10-20 placement system.  Thirty two (32) channels of digital signal (sampling rate of 512/sec) including T1 and T2 was simultaneously recorded from the scalp and may include  EKG, EMG, and/or eye monitors.  Recording band pass was 0.1 to 512 hz.  Digital video recording of the patient is simultaneously recorded with the EEG.  The patient is instructed report clinical symptoms which may occur during the recording session.  EEG and video recording is stored and archived in digital format. Activation procedures which include photic stimulation, hyperventilation and instructing patients to perform simple task are done in selected patients.    The EEG is displayed on a monitor screen and can be reviewed using different montages.  Computer assisted analysis is employed to detect spike and electrographic seizure activity.   The entire record is submitted for computer analysis.  The entire recording is visually reviewed and the times identified by computer analysis as being spikes or seizures are reviewed again.  Compresses spectral analysis (CSA) is also performed on the activity recorded from each individual channel.  This is displayed as a power display of frequencies from 0 to 30 Hz over time.   The CSA is reviewed looking for asymmetries in power between homologous areas of the scalp and then compared with the original EEG recording.     [employer] software is also utilized in the review of this study.  This software suite analyzes the EEG recording in multiple domains.  Coherence and rhythmicity is computed to identify EEG sections which may contain organized  seizures.  Each channel undergoes analysis to detect presence of spike and sharp waves which have special and morphological characteristic of epileptic activity.  The routine EEG recording is converted from spacial into frequency domain.  This is then displayed comparing homologous areas to identify areas of significant asymmetry.  Algorithm to identify non-cortically generated artifact is used to separate eye movement, EMG and other artifact from the EEG.      EEG FINDINGS  Background activity:   The background is low-voltage 5-7 hz theta activity with occasional alpha rhythms as well as some admixed delta.    Sleep:  There is no normal sleep architecture    Activation procedures:   Hyperventilation is not performed  Photic stimulation is not performed    Cardiac Monitor:   Heart rate is irregular     IMPRESSION:   This is an abnormal EEG because of generalized background slowing consistent with diffuse cortical dysfunction and a mild to moderate encephalopathy.  This finding is nonspecific with regards to etiology but can be seen in the setting of toxic/metabolic derangements, infection, and as a medication effect.  There are no prominent focal findings, however, encephalopathy obscures focal findings.  There are no epileptiform discharges and no electrographic seizures.    Santa Thrasher MD PhD  Neurology-Epilepsy  [facility].  Office extension: [phone]

## 2019-03-29 NOTE — ASSESSMENT & PLAN NOTE
-- Started on Azithromycin and steroids at OSH due to concern for Asthma.  -- Pulmonology consulted, no concern for asthma. Appreciate recs.  -- Abx and steroids d/derrick.  -- Duonebs PRN

## 2019-03-29 NOTE — ASSESSMENT & PLAN NOTE
-- H/o CAD/ CABG 2007  -- Continue Atorvastatin 80. ASA held today give drop in platelets from 101-->80s  -- Heparin gtt. Intiated bridge to coumadin on 3/27.  -- Appreciate cardiology recs.

## 2019-03-29 NOTE — PROGRESS NOTES
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     Review of patient's allergies indicates:   Allergen Reactions    Ace inhibitors Other (See Comments)     Cough    Arb-angiotensin receptor antagonist Itching    Eplerenone Other (See Comments)     Marked bradycardia, 40, tiredness and weakness      Sulfa (sulfonamide antibiotics) Itching     Patient says this was 10 years ago and doesn't remember what happened     Current Facility-Administered Medications   Medication Frequency    0.9%  NaCl infusion PRN    0.9%  NaCl infusion Once    acetaminophen tablet 650 mg Q6H PRN    albuterol-ipratropium 2.5 mg-0.5 mg/3 mL nebulizer solution 3 mL Q4H PRN    allopurinol tablet 100 mg Daily    atorvastatin tablet 80 mg Daily    carvedilol tablet 50 mg BID WM    dextrose 50% injection 12.5 g PRN    dextrose 50% injection 25 g PRN    finasteride tablet 5 mg Daily    gabapentin capsule 300 mg QHS    glucagon (human recombinant) injection 1 mg PRN    glucose chewable tablet 16 g PRN    glucose chewable tablet 24 g PRN    heparin 25,000 units in dextrose 5% (100 units/ml) IV bolus from bag - ADDITIONAL PRN BOLUS - 30 units/kg (max bolus 4000 units) PRN    heparin 25,000 units in dextrose 5% (100 units/ml) IV bolus from  bag - ADDITIONAL PRN BOLUS - 60 units/kg (max bolus 4000 units) PRN    heparin 25,000 units in dextrose 5% 250 mL (100 units/mL) infusion LOW INTENSITY nomogram - [facility] Continuous    insulin aspart U-100 pen 1-10 Units QID (AC + HS) PRN    insulin detemir U-100 pen 8 Units Daily    losartan tablet 100 mg QHS    nitroGLYCERIN SL tablet 0.4 mg Q5 Min PRN    ondansetron tablet 8 mg Q6H PRN    pantoprazole EC tablet 40 mg Daily    polyethylene glycol packet 17 g Daily    senna-docusate 8.6-50 mg per tablet 1 tablet BID    tamsulosin 24 hr capsule 0.4 mg Daily    torsemide tablet 20 mg BID    warfarin (COUMADIN) tablet 5 mg Daily       Objective:     Vital Signs (Most Recent):  Temp: 98.8 °F (37.1 °C) (03/29/19 0300)  Pulse: 66 (03/29/19 0800)  Resp: (!) 9 (03/29/19 0800)  BP: (!) 169/78 (03/29/19 0701)  SpO2: 97 % (03/29/19 0800)  O2 Device (Oxygen Therapy): room air (03/29/19 0600) Vital Signs (24h Range):  Temp:  [98.2 °F (36.8 °C)-98.8 °F (37.1 °C)] 98.8 °F (37.1 °C)  Pulse:  [64-79] 66  Resp:  [9-25] 9  SpO2:  [93 %-100 %] 97 %  BP: (126-185)/(59-85) 169/78     Weight: 106.6 kg (235 lb 0.2 oz) (03/24/19 0600)  Body mass index is 31.01 kg/m².  Body surface area is 2.34 meters squared.    I/O last 3 completed shifts:  In: 2101.2 [P.O.:50; I.V.:551.2; Other:1500]  Out: 1866 [Urine:305; Other:1561]    Physical Exam   Constitutional: No distress.   HENT:   Head: Normocephalic.   Right Ear: External ear normal.   Eyes: Right eye exhibits no discharge. Left eye exhibits no discharge.   Cardiovascular: An irregular rhythm present.   Pulmonary/Chest: Effort normal. No respiratory distress.   Abdominal: Soft.   Neurological: He is alert.   Skin: Skin is warm.       Significant Labs:  ABGs:   Recent Labs   Lab 03/24/19  1419   PH 7.397   PCO2 31.7*   HCO3 19.5*   POCSATURATED 97   BE -5     BMP:   Recent Labs   Lab 03/29/19  0142     100     106   CO2 23  23   BUN 56*  56*   CREATININE 5.1*  5.1*    CALCIUM 7.1*  7.1*   MG 2.2  2.2     CBC:   Recent Labs   Lab 03/29/19  0142   WBC 6.88  6.88  6.88   RBC 2.71*  2.71*  2.71*   HGB 8.8*  8.8*  8.8*   HCT 26.2*  26.2*  26.2*   PLT 86*  86*  86*   MCV 97  97  97   MCH 32.5*  32.5*  32.5*   MCHC 33.6  33.6  33.6     CMP:   Recent Labs   Lab 03/29/19  0142     100   CALCIUM 7.1*  7.1*   ALBUMIN 2.5*  2.5*   PROT 4.5*  4.5*     140   K 4.2  4.2   CO2 23  23     106   BUN 56*  56*   CREATININE 5.1*  5.1*   ALKPHOS 80  80   ALT 29  29   AST 13  13   BILITOT 0.9  0.9     All labs within the past 24 hours have been reviewed.         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 Valderrama in [place]  -HD tx days: MWF  -HD tx time: 4 hours  -HD access: AVF   -Last HD: yesterday  -EDW: 106.0kg  -RRF: yes      Assessment and plan:  - Will schedule for HD treatment for today           Wilfred Pittman  Nephrology  Fellow  [facility]    Pager [phone]

## 2019-03-29 NOTE — ASSESSMENT & PLAN NOTE
Micheal Hunt is a 78 year old man who is ESRD which presented with L PCA infarct     Dialysis Information: [facility]  -Out patient HD unit: [facility]  -Nephrologist: Dr. Valderrama in [place]  -HD tx days: MWF  -HD tx time: 4 hours  -HD access: AVF   -Last HD: yesterday  -EDW: 106.0kg  -RRF: yes      Assessment and plan:  - Will schedule for HD treatment for today

## 2019-03-30 LAB
ALBUMIN SERPL BCP-MCNC: 2.7 G/DL (ref 3.5–5.2)
ALP SERPL-CCNC: 81 U/L (ref 55–135)
ALT SERPL W/O P-5'-P-CCNC: 27 U/L (ref 10–44)
ANION GAP SERPL CALC-SCNC: 10 MMOL/L (ref 8–16)
APTT BLDCRRT: 53 SEC (ref 21–32)
APTT BLDCRRT: 57.9 SEC (ref 21–32)
APTT BLDCRRT: 59.2 SEC (ref 21–32)
APTT BLDCRRT: 61.3 SEC (ref 21–32)
APTT BLDCRRT: 69.8 SEC (ref 21–32)
AST SERPL-CCNC: 17 U/L (ref 10–40)
BASOPHILS # BLD AUTO: 0 K/UL (ref 0–0.2)
BASOPHILS # BLD AUTO: 0.01 K/UL (ref 0–0.2)
BASOPHILS # BLD AUTO: 0.01 K/UL (ref 0–0.2)
BASOPHILS NFR BLD: 0 % (ref 0–1.9)
BASOPHILS NFR BLD: 0.1 % (ref 0–1.9)
BASOPHILS NFR BLD: 0.1 % (ref 0–1.9)
BILIRUB SERPL-MCNC: 0.7 MG/DL (ref 0.1–1)
BUN SERPL-MCNC: 44 MG/DL (ref 8–23)
CALCIUM SERPL-MCNC: 8.4 MG/DL (ref 8.7–10.5)
CHLORIDE SERPL-SCNC: 110 MMOL/L (ref 95–110)
CO2 SERPL-SCNC: 21 MMOL/L (ref 23–29)
CREAT SERPL-MCNC: 3.8 MG/DL (ref 0.5–1.4)
DIFFERENTIAL METHOD: ABNORMAL
EOSINOPHIL # BLD AUTO: 0.1 K/UL (ref 0–0.5)
EOSINOPHIL NFR BLD: 0.8 % (ref 0–8)
EOSINOPHIL NFR BLD: 0.8 % (ref 0–8)
EOSINOPHIL NFR BLD: 1 % (ref 0–8)
ERYTHROCYTE [DISTWIDTH] IN BLOOD BY AUTOMATED COUNT: 15.1 % (ref 11.5–14.5)
ERYTHROCYTE [DISTWIDTH] IN BLOOD BY AUTOMATED COUNT: 15.1 % (ref 11.5–14.5)
ERYTHROCYTE [DISTWIDTH] IN BLOOD BY AUTOMATED COUNT: 15.2 % (ref 11.5–14.5)
EST. GFR  (AFRICAN AMERICAN): 16.4 ML/MIN/1.73 M^2
EST. GFR  (NON AFRICAN AMERICAN): 14.2 ML/MIN/1.73 M^2
GLUCOSE SERPL-MCNC: 95 MG/DL (ref 70–110)
HCT VFR BLD AUTO: 23.6 % (ref 40–54)
HCT VFR BLD AUTO: 25.1 % (ref 40–54)
HCT VFR BLD AUTO: 25.1 % (ref 40–54)
HGB BLD-MCNC: 7.9 G/DL (ref 14–18)
HGB BLD-MCNC: 8.2 G/DL (ref 14–18)
HGB BLD-MCNC: 8.2 G/DL (ref 14–18)
IMM GRANULOCYTES # BLD AUTO: 0.08 K/UL (ref 0–0.04)
IMM GRANULOCYTES # BLD AUTO: 0.09 K/UL (ref 0–0.04)
IMM GRANULOCYTES # BLD AUTO: 0.09 K/UL (ref 0–0.04)
IMM GRANULOCYTES NFR BLD AUTO: 1 % (ref 0–0.5)
IMM GRANULOCYTES NFR BLD AUTO: 1 % (ref 0–0.5)
IMM GRANULOCYTES NFR BLD AUTO: 1.1 % (ref 0–0.5)
INR PPP: 1.1 (ref 0.8–1.2)
LYMPHOCYTES # BLD AUTO: 1.3 K/UL (ref 1–4.8)
LYMPHOCYTES # BLD AUTO: 1.6 K/UL (ref 1–4.8)
LYMPHOCYTES # BLD AUTO: 1.6 K/UL (ref 1–4.8)
LYMPHOCYTES NFR BLD: 17.7 % (ref 18–48)
LYMPHOCYTES NFR BLD: 17.9 % (ref 18–48)
LYMPHOCYTES NFR BLD: 17.9 % (ref 18–48)
MAGNESIUM SERPL-MCNC: 2.1 MG/DL (ref 1.6–2.6)
MCH RBC QN AUTO: 31.8 PG (ref 27–31)
MCH RBC QN AUTO: 31.8 PG (ref 27–31)
MCH RBC QN AUTO: 32 PG (ref 27–31)
MCHC RBC AUTO-ENTMCNC: 32.7 G/DL (ref 32–36)
MCHC RBC AUTO-ENTMCNC: 32.7 G/DL (ref 32–36)
MCHC RBC AUTO-ENTMCNC: 33.5 G/DL (ref 32–36)
MCV RBC AUTO: 96 FL (ref 82–98)
MCV RBC AUTO: 97 FL (ref 82–98)
MCV RBC AUTO: 97 FL (ref 82–98)
MONOCYTES # BLD AUTO: 0.4 K/UL (ref 0.3–1)
MONOCYTES # BLD AUTO: 0.5 K/UL (ref 0.3–1)
MONOCYTES # BLD AUTO: 0.5 K/UL (ref 0.3–1)
MONOCYTES NFR BLD: 5.6 % (ref 4–15)
MONOCYTES NFR BLD: 5.9 % (ref 4–15)
MONOCYTES NFR BLD: 5.9 % (ref 4–15)
NEUTROPHILS # BLD AUTO: 5.3 K/UL (ref 1.8–7.7)
NEUTROPHILS # BLD AUTO: 6.6 K/UL (ref 1.8–7.7)
NEUTROPHILS # BLD AUTO: 6.6 K/UL (ref 1.8–7.7)
NEUTROPHILS NFR BLD: 74.3 % (ref 38–73)
NEUTROPHILS NFR BLD: 74.3 % (ref 38–73)
NEUTROPHILS NFR BLD: 74.6 % (ref 38–73)
NRBC BLD-RTO: 0 /100 WBC
PHOSPHATE SERPL-MCNC: 3.2 MG/DL (ref 2.7–4.5)
PLATELET # BLD AUTO: 90 K/UL (ref 150–350)
PLATELET # BLD AUTO: 91 K/UL (ref 150–350)
PLATELET # BLD AUTO: 91 K/UL (ref 150–350)
PMV BLD AUTO: 10.4 FL (ref 9.2–12.9)
PMV BLD AUTO: 10.4 FL (ref 9.2–12.9)
PMV BLD AUTO: 11.4 FL (ref 9.2–12.9)
POCT GLUCOSE: 107 MG/DL (ref 70–110)
POCT GLUCOSE: 114 MG/DL (ref 70–110)
POCT GLUCOSE: 144 MG/DL (ref 70–110)
POCT GLUCOSE: 147 MG/DL (ref 70–110)
POCT GLUCOSE: 155 MG/DL (ref 70–110)
POCT GLUCOSE: 187 MG/DL (ref 70–110)
POTASSIUM SERPL-SCNC: 4.4 MMOL/L (ref 3.5–5.1)
PROT SERPL-MCNC: 5.1 G/DL (ref 6–8.4)
PROTHROMBIN TIME: 11.7 SEC (ref 9–12.5)
RBC # BLD AUTO: 2.47 M/UL (ref 4.6–6.2)
RBC # BLD AUTO: 2.58 M/UL (ref 4.6–6.2)
RBC # BLD AUTO: 2.58 M/UL (ref 4.6–6.2)
SODIUM SERPL-SCNC: 141 MMOL/L (ref 136–145)
WBC # BLD AUTO: 7.1 K/UL (ref 3.9–12.7)
WBC # BLD AUTO: 8.85 K/UL (ref 3.9–12.7)
WBC # BLD AUTO: 8.85 K/UL (ref 3.9–12.7)

## 2019-03-30 PROCEDURE — 99233 SBSQ HOSP IP/OBS HIGH 50: CPT | Mod: GC,,, | Performed by: PSYCHIATRY & NEUROLOGY

## 2019-03-30 PROCEDURE — 25000003 PHARM REV CODE 250: Performed by: INTERNAL MEDICINE

## 2019-03-30 PROCEDURE — 85730 THROMBOPLASTIN TIME PARTIAL: CPT | Mod: 91

## 2019-03-30 PROCEDURE — 99233 PR SUBSEQUENT HOSPITAL CARE,LEVL III: ICD-10-PCS | Mod: GC,,, | Performed by: PSYCHIATRY & NEUROLOGY

## 2019-03-30 PROCEDURE — 97166 OT EVAL MOD COMPLEX 45 MIN: CPT

## 2019-03-30 PROCEDURE — 84100 ASSAY OF PHOSPHORUS: CPT

## 2019-03-30 PROCEDURE — 25000003 PHARM REV CODE 250: Performed by: STUDENT IN AN ORGANIZED HEALTH CARE EDUCATION/TRAINING PROGRAM

## 2019-03-30 PROCEDURE — 25000003 PHARM REV CODE 250: Performed by: HOSPITALIST

## 2019-03-30 PROCEDURE — 83735 ASSAY OF MAGNESIUM: CPT

## 2019-03-30 PROCEDURE — 80053 COMPREHEN METABOLIC PANEL: CPT

## 2019-03-30 PROCEDURE — 94761 N-INVAS EAR/PLS OXIMETRY MLT: CPT

## 2019-03-30 PROCEDURE — 97530 THERAPEUTIC ACTIVITIES: CPT

## 2019-03-30 PROCEDURE — 85025 COMPLETE CBC W/AUTO DIFF WBC: CPT | Mod: 91

## 2019-03-30 PROCEDURE — 85610 PROTHROMBIN TIME: CPT

## 2019-03-30 PROCEDURE — 20600001 HC STEP DOWN PRIVATE ROOM

## 2019-03-30 PROCEDURE — 63600175 PHARM REV CODE 636 W HCPCS: Performed by: UROLOGY

## 2019-03-30 PROCEDURE — 80100014 HC HEMODIALYSIS 1:1

## 2019-03-30 RX ORDER — CLINDAMYCIN HYDROCHLORIDE 150 MG/1
300 CAPSULE ORAL EVERY 8 HOURS
Status: DISCONTINUED | OUTPATIENT
Start: 2019-03-30 | End: 2019-03-30

## 2019-03-30 RX ORDER — CLINDAMYCIN HYDROCHLORIDE 150 MG/1
300 CAPSULE ORAL EVERY 6 HOURS
Status: DISCONTINUED | OUTPATIENT
Start: 2019-03-30 | End: 2019-04-02

## 2019-03-30 RX ORDER — CLINDAMYCIN HYDROCHLORIDE 150 MG/1
300 CAPSULE ORAL EVERY 6 HOURS
Status: DISCONTINUED | OUTPATIENT
Start: 2019-03-30 | End: 2019-03-30

## 2019-03-30 RX ADMIN — CLINDAMYCIN HYDROCHLORIDE 300 MG: 150 CAPSULE ORAL at 12:03

## 2019-03-30 RX ADMIN — POLYETHYLENE GLYCOL 3350 17 G: 17 POWDER, FOR SOLUTION ORAL at 08:03

## 2019-03-30 RX ADMIN — HEPARIN SODIUM AND DEXTROSE 14 UNITS/KG/HR: 10000; 5 INJECTION INTRAVENOUS at 06:03

## 2019-03-30 RX ADMIN — GABAPENTIN 300 MG: 300 CAPSULE ORAL at 09:03

## 2019-03-30 RX ADMIN — ALLOPURINOL 100 MG: 100 TABLET ORAL at 08:03

## 2019-03-30 RX ADMIN — LOSARTAN POTASSIUM 100 MG: 50 TABLET, FILM COATED ORAL at 09:03

## 2019-03-30 RX ADMIN — STANDARDIZED SENNA CONCENTRATE AND DOCUSATE SODIUM 1 TABLET: 8.6; 5 TABLET, FILM COATED ORAL at 08:03

## 2019-03-30 RX ADMIN — WARFARIN SODIUM 5 MG: 5 TABLET ORAL at 06:03

## 2019-03-30 RX ADMIN — PANTOPRAZOLE SODIUM 40 MG: 40 TABLET, DELAYED RELEASE ORAL at 08:03

## 2019-03-30 RX ADMIN — TORSEMIDE 20 MG: 20 TABLET ORAL at 09:03

## 2019-03-30 RX ADMIN — TAMSULOSIN HYDROCHLORIDE 0.4 MG: 0.4 CAPSULE ORAL at 08:03

## 2019-03-30 RX ADMIN — TORSEMIDE 20 MG: 20 TABLET ORAL at 08:03

## 2019-03-30 RX ADMIN — INSULIN ASPART 2 UNITS: 100 INJECTION, SOLUTION INTRAVENOUS; SUBCUTANEOUS at 02:03

## 2019-03-30 RX ADMIN — STANDARDIZED SENNA CONCENTRATE AND DOCUSATE SODIUM 1 TABLET: 8.6; 5 TABLET, FILM COATED ORAL at 09:03

## 2019-03-30 RX ADMIN — ATORVASTATIN CALCIUM 80 MG: 20 TABLET, FILM COATED ORAL at 09:03

## 2019-03-30 RX ADMIN — FINASTERIDE 5 MG: 5 TABLET, FILM COATED ORAL at 08:03

## 2019-03-30 RX ADMIN — INSULIN ASPART 1 UNITS: 100 INJECTION, SOLUTION INTRAVENOUS; SUBCUTANEOUS at 09:03

## 2019-03-30 RX ADMIN — CLINDAMYCIN HYDROCHLORIDE 300 MG: 150 CAPSULE ORAL at 06:03

## 2019-03-30 RX ADMIN — INSULIN DETEMIR 8 UNITS: 100 INJECTION, SOLUTION SUBCUTANEOUS at 08:03

## 2019-03-30 RX ADMIN — CARVEDILOL 25 MG: 25 TABLET, FILM COATED ORAL at 06:03

## 2019-03-30 NOTE — SUBJECTIVE & OBJECTIVE
Review of Systems   Constitutional: Negative for fever.   HENT: Negative for trouble swallowing.    Neurological: Positive for facial asymmetry, speech difficulty and weakness.     Objective:     Vitals:  Temp: 98 °F (36.7 °C)  Pulse: 82  Rhythm: atrial rhythm  BP: (!) 109/45  MAP (mmHg): 64  Resp: (!) 22  SpO2: 100 %  O2 Device (Oxygen Therapy): room air    Temp  Min: 98 °F (36.7 °C)  Max: 99.3 °F (37.4 °C)  Pulse  Min: 66  Max: 85  BP  Min: 91/67  Max: 151/62  MAP (mmHg)  Min: 64  Max: 96  Resp  Min: 11  Max: 26  SpO2  Min: 96 %  Max: 100 %    03/29 0701 - 03/30 0700  In: 505.9 [I.V.:305.9]  Out: 1335 [Urine:835]   Unmeasured Output  Urine Occurrence: 1  Stool Occurrence: 0  Pad Count: 1       Physical Exam   Constitutional: No distress.   Eyes: Pupils are equal, round, and reactive to light.   Neurological: He is alert.   E4V4M6  Alert. Oriented to person only.  Dysarthric+. Follows simple commands.  Pupils reactive bilaterally.  R facial droop  RUE - 0/5  RLE - 1/5  LUE - 5/5  LLE - 3/5   Nursing note and vitals reviewed.    Medications:  Continuous  heparin (porcine) in D5W Last Rate: 14.018 Units/kg/hr (03/30/19 1502)   Scheduled  sodium chloride 0.9%  Once   allopurinol 100 mg Daily   atorvastatin 80 mg Daily   carvedilol 25 mg BID WM   clindamycin 300 mg Q6H   finasteride 5 mg Daily   gabapentin 300 mg QHS   insulin detemir U-100 8 Units Daily   losartan 100 mg QHS   pantoprazole 40 mg Daily   polyethylene glycol 17 g Daily   senna-docusate 8.6-50 mg 1 tablet BID   tamsulosin 1 capsule Daily   torsemide 20 mg BID   warfarin 5 mg Daily   PRN  sodium chloride 0.9%  PRN   acetaminophen 650 mg Q6H PRN   albuterol-ipratropium 3 mL Q4H PRN   dextrose 50% 12.5 g PRN   dextrose 50% 25 g PRN   glucagon (human recombinant) 1 mg PRN   glucose 16 g PRN   glucose 24 g PRN   heparin (PORCINE) 30 Units/kg (Adjusted) PRN   heparin (PORCINE) 44.2 Units/kg (Adjusted) PRN   insulin aspart U-100 1-10 Units QID (AC + HS) PRN    nitroGLYCERIN 0.4 mg Q5 Min PRN   ondansetron 8 mg Q6H PRN     Today I personally reviewed pertinent medications, lines/drains/airways, imaging, cardiology results, laboratory results, notably:    Diet  Diet Dysphagia Mechanical Soft (IDDSI Level 5) [facility]; Cardiac (Low Na/Chol), Coumadin Restriction, Renal; Nectar Thick  Diet Dysphagia Mechanical Soft (IDDSI Level 5) [facility]; Cardiac (Low Na/Chol), Coumadin Restriction, Renal; Nectar Thick

## 2019-03-30 NOTE — PROGRESS NOTES
HD NOTES    Received patient asleep, responds thru name calling, on room air not in cp distress. UF NET even as per order (- 3L in I/O sheet).    HD started via left upper brachiocephalic avf, with good bruit and thrill. (+) hematoma on site. Cannulated w/o issues, bfr kept at 450cc/min.    See flow sheet for monitoring

## 2019-03-30 NOTE — PT/OT/SLP EVAL
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  He presents alert and willing to participate in therapy evaluation. Pt found supine with son at bedside.Performance deficits affecting function: weakness, impaired endurance, gait instability, impaired balance, decreased upper extremity function, decreased lower extremity function, impaired self care skills, impaired functional mobilty, impaired cognition, pain, edema, abnormal tone, decreased safety awareness, impaired fine motor, impaired skin, decreased ROM ( R Sided weakness), visual deficits.   Pt demo cognitive deficits with attention, command following, safety awareness, aphasia and overall decline in PLOF with cognitive task. Pt requires increased level of skilled assistance with ADL and functional mobility at this time. He would benefit from rehab following d/c to continue to progress towards goals and improve quality of life.     Rehab Prognosis: Good; patient would benefit from acute skilled OT services to address these deficits and reach maximum level of function.       Plan:     Patient to be seen 4 x/week to address the above listed problems via self-care/home management, therapeutic activities, therapeutic exercises, neuromuscular re-education, cognitive retraining  · Plan of Care Expires: 04/28/19  · Plan of Care Reviewed with: patient, son    Subjective     Chief Complaint: " I need to stretch"   Patient/Family Comments/goals: No goals stated ( pt " "aphasic)     Occupational Profile: history obtained from son   Living Environment: Pt lives alone, 1SH with 3STE ( B railing) with ramp access  Previous level of function: PTA, pt independent with ADL and functional mobility ( occasionally using SC)  Roles and Routines: Home/community dweller, lives " active" lifestyle; owns horses  Equipment Used at Home:  cane, straight  Assistance upon Discharge: Daughter lives in house on same property     Pain/Comfort:  · Pain Rating 1: (states he has back pain; however winces with RUE movement)  · Location - Side 1: Right  · Location 1: arm  · Pain Addressed 1: Reposition, Cessation of Activity    Patients cultural, spiritual, Tenriism conflicts given the current situation: no    Objective:     Communicated with: RN prior to session.  Patient found with bed in chair position with bed alarm, telemetry, pulse ox (continuous), SCD, peripheral IV, blood pressure cuff(condom cath) upon OT entry to room.    General Precautions: Standard, fall, aphasia   Orthopedic Precautions:N/A   Braces: N/A     Occupational Performance:    Bed Mobility:    · Patient completed Rolling/Turning to Left with  total assistance  · Patient completed Rolling/Turning to Right with moderate assistance  · Patient completed Supine to Sit with maximal assistance and 2 persons  · Patient completed Sit to Supine with maximal assistance and 2 persons    Functional Mobility/Transfers:  · NT, 2/2 poor sitting balance/ overall safety concerns     Activities of Daily Living:  · Grooming: moderate assistance to wash face using LUE while seated EOB ( pt required cues to initiate and provided support at elbow to assist with movement); therapy tech providing min-mod A for postural control   · Upper Body Dressing: maximal assistance to noah gown like jacket while seated EOB  · Lower Body Dressing: total assistance to noah B socks while supine  · Toileting: Pt soiled upon arrival ( pt assisted by turning to R/L side in " supine); pt required TA for perineal care    Cognitive/Visual Perceptual:  Cognitive/Psychosocial Skills:     -       Oriented to: Person and Place   -       Follows Commands/attention:Inattentive, Easily distracted and Follows 75% one-step commands  -       Communication: expressive aphasia; better with spontaneous speech  -       Safety awareness/insight to disability: impaired   -       Mood/Affect/Coping skills/emotional control: Flat affect  Visual/Perceptual:      -Impaired ( R inattention); L visual gaze preference    Physical Exam:  Postural examination/scapula alignment:    -       Rounded shoulders  -       Forward head  Skin integrity: Bruising of RUE  Edema:  Moderate RUE ( elevated using pillows)  Sensation:  Unable to fully assess 2/2 cog deficits  Dominant hand:    -       RUE ( per son)  Upper Extremity Range of Motion:     -       Right Upper Extremity: Unable to range full PROM 2/2 complaitns of pain/ Edema ( no active movement noted)  -       Left Upper Extremity: WFL  Upper Extremity Strength:    -       Right Upper Extremity: 0/5  -       Left Upper Extremity: WFL 4+/5   Strength:    -       Right Upper Extremity: 0/5  -       Left Upper Extremity: 5/5  Gross motor coordination:   hemiplegia/paresis      AMPAC 6 Click ADL:  AMPAC Total Score: 9    Treatment & Education:  -Pt edu on OT role/POC  -Importance of OOB activity with staff assistance  -Safety during functional t/f and mobility  - White board updated  - Multiple self care tasks completed-- assistance level noted above  - Edu family on the importance of provided daily re-orientation, blinds open during the day, stand to R of patient to encourage R gaze, minimize distraction when communicating with pt ( turn tv off); edu son on R hand/wrist PROM ( son recorded to send to daughter)  Education:    Patient left with bed in chair position with all lines intact, call button in reach, RN notified and RN/son present    GOALS:    Multidisciplinary Problems     Occupational Therapy Goals        Problem: Occupational Therapy Goal    Goal Priority Disciplines Outcome Interventions   Occupational Therapy Goal     OT, PT/OT Ongoing (interventions implemented as appropriate)    Description:  Goals to be met by:  4/9/19    Patient will increase functional independence with ADLs by performing:    UE Dressing with Moderate Assistance using nikita-dressing technique.  Grooming while EOB with Moderate Assistance.  Rolling to Right with minimal assistance  Rolling to Left with Maximum Assistance.   Supine to sit with Maximum Assistance.  Toilet transfer to bedside commode with Maximum Assistance.  Pt/family demo and verbalized understanding of  BUE HEP and proper positioning in bed to prevent edema, contractures, and pressure ulcers.  Pt follow 100% of simple one-step commands with minimal cues provided.                        History:     Past Medical History:   Diagnosis Date    NICK (acute kidney injury) 7/29/2016    Allergy     Anemia, mild 12/15/2014    Arthritis     Gout    Benign essential HTN 3/27/2012    BMI 29.0-29.9,adult 5/10/2018    BPH (benign prostatic hyperplasia)     BPH (benign prostatic hyperplasia)     CAD (coronary artery disease) 2006    Chronic kidney disease     due to ibuprofen    Colon polyp     CRF (chronic renal failure), stage 5     Diverticulosis     Gastritis     GERD (gastroesophageal reflux disease)     Gout     History of colon polyps 5/3/2018    HTN (hypertension) 3/27/2012    Hyperlipidemia     Hyperlipidemia     LLL pneumonia 6/14/2018    LVH (left ventricular hypertrophy)     Mesenteric ischemia     Murmur, cardiac 3/27/2012    GRICELDA (obstructive sleep apnea)     DOES NOT USE A MACHINE    Sinus problem     Syncope and collapse        Past Surgical History:   Procedure Laterality Date    APPENDECTOMY      BLOCK-NERVE Left 5/31/2016    [facility]    CARDIAC  CATHETERIZATION      COLONOSCOPY  2011    COLONOSCOPY N/A 5/3/2018    Performed [facility]i [facility] at [facility]O    COLONOSCOPY N/A 9/10/2015    [facility] at [facility]    CORONARY ARTERY BYPASS GRAFT  4/2007    x 1    CYSTOSCOPY N/A 8/30/2017    [facility]    CYSTOSCOPY N/A 11/10/2015    [facility] at [facility] OR    ESOPHAGOGASTRODUODENOSCOPY (EGD) N/A 1/4/2016    [facility] Tra [facility] END[facility] (2ND FLR)    ESOPHAGOGASTRODUODENOSCOPY (EGD) N/A 10/15/2014    Performed by [facility] at [facility] ENDO    INJECTION-STEROID-EPIDURAL-TRANSFORAMINAL Left 2/25/2016    [facility] OR    JOINT REPLACEMENT      left knee total replacement  X 3    mid leftt finger      from a cactuss    MOLE REMOVAL  2016    RADIOFREQUENCY THERMOCOAGULATION (RFTC)-NERVE-MEDIAN BRANCH-LUMBAR Left 4/11/2016    [facility] OR    rotative cuff      no rotative cuffs on bilat shoulders has pins     SHOULDER SURGERY      shoulder surgery bilat  RIGHT X 4; LEFT X 3    TRANSRECTAL ULTRASOUND GUIDED PROSTATE BIOPSY Bilateral 11/10/2015    [facility] at [facility] OR    ULTRASOUND-ENDOSCOPIC-UPPER N/A 5/31/2017    [facility] END[facility] (2ND FLR)    ULTRASOUND-ENDOSCOPIC-UPPER N/A 1/4/2016    [facility] END[facility] (2ND FLR)    ULTRASOUND-ENDOSCOPIC-UPPER N/A 1/16/2015    Performed by Jason Saleem [facility] at [facility] ENDO (2ND FLR)       Time Tracking:     OT Date of Treatment: 03/30/19  OT Start Time: 0917  OT Stop Time: 0949  OT Total Time (min): 32 min    Billable Minutes:Evaluation 22  Therapeutic Activity 10    Lucinda goncalves OT  3/30/2019

## 2019-03-30 NOTE — PROGRESS NOTES
New lab results out.    Na- 140 ; Ca- 7.1; K - 4.2    Changed dialysate bath to:  Na - 140  Ca- keep the same 3.0  K- 3k

## 2019-03-30 NOTE — PLAN OF CARE
Problem: Adult Inpatient Plan of Care  Goal: Plan of Care Review  Past Medical History:  7/29/2016: NICK (acute kidney injury)  No date: Allergy  12/15/2014: Anemia, mild  No date: Arthritis      Comment:  Gout  3/27/2012: Benign essential HTN  5/10/2018: BMI 29.0-29.9,adult  No date: BPH (benign prostatic hyperplasia)  No date: BPH (benign prostatic hyperplasia)  2006: CAD (coronary artery disease)  No date: Chronic kidney disease      Comment:  due to ibuprofen  No date: Colon polyp  No date: CRF (chronic renal failure), stage 5  No date: Diverticulosis  No date: Gastritis  No date: GERD (gastroesophageal reflux disease)  No date: Gout  5/3/2018: History of colon polyps  3/27/2012: HTN (hypertension)  No date: Hyperlipidemia  No date: Hyperlipidemia  6/14/2018: LLL pneumonia  No date: LVH (left ventricular hypertrophy)  No date: Mesenteric ischemia  3/27/2012: Murmur, cardiac  No date: GRICELDA (obstructive sleep apnea)      Comment:  DOES NOT USE A MACHINE  No date: Sinus problem  No date: Syncope and collapse      Past Surgical History:  No date: APPENDECTOMY  5/31/2016: BLOCK-NERVE; Left      Comment:  Performed by [facility]on [facility] at [facility] OR  No date: CARDIAC CATHETERIZATION  2011: COLONOSCOPY  5/3/2018: COLONOSCOPY; N/A      Comment:  Performed by [facility] Harris MD at [facility] ENDO  9/10/2015: COLONOSCOPY; N/A      Comment:  Performed by Messi Harris MD at [facility] ENDO  4/2007: CORONARY ARTERY BYPASS GRAFT      Comment:  x 1  8/30/2017: CYSTOSCOPY; N/A      Comment:  Performed by Rudy Herring MD at [facility] OR  11/10/2015: CYSTOSCOPY; N/A      Comment:  Performed by Rudy Herring MD at [facility] OR  1/4/2016: ESOPHAGOGASTRODUODENOSCOPY (EGD); N/A      Comment:  Performed by Tra Brooks MD at [facility] ENDO (2ND FLR)  10/15/2014: ESOPHAGOGASTRODUODENOSCOPY (EGD); N/A      Comment:  Performed by Messi Harris MD at [facility] ENDO  2/25/2016: INJECTION-STEROID-EPIDURAL-TRANSFORAMINAL; Left      Comment:  Performed by  Kevin Leon MD at [facility] OR  No date: JOINT REPLACEMENT      Comment:  left knee total replacement  X 3  No date: mid leftt finger      Comment:  from a sony  2016: MOLE REMOVAL  4/11/2016: RADIOFREQUENCY THERMOCOAGULATION (RFTC)-NERVE-MEDIAN   BRANCH-LUMBAR; Left      Comment:  Performed by Kevin Leon MD at [facility] OR  No date: rotative cuff      Comment:  no rotative cuffs on bilat shoulders has pins   No date: SHOULDER SURGERY      Comment:  shoulder surgery bilat  RIGHT X 4; LEFT X 3  11/10/2015: TRANSRECTAL ULTRASOUND GUIDED PROSTATE BIOPSY; Bilateral      Comment:  Performed by [facility] at [facility] OR  5/31/2017: ULTRASOUND-ENDOSCOPIC-UPPER; N/A      Comment:  Performed by Tra Brooks MD at [facility] ENDO (2ND FLR)  1/4/2016: ULTRASOUND-ENDOSCOPIC-UPPER; N/A      Comment:  Performed by Tra Brooks MD at [facility] ENDO (2ND FLR)  1/16/2015: ULTRASOUND-ENDOSCOPIC-UPPER; N/A      Comment:  Performed by Jason Saleem MD at [facility] ENDO (2ND FLR)    Patient likes blankets on.    Outcome: Ongoing (interventions implemented as appropriate)  POC reviewed with pt and son at 0500. Pt unable to verbalize understanding due to condition. Questions and concerns addressed. No acute events overnight. Heparin gtt maintained with aptt in therapeutic range. Tmax 99.3F. No BM.  Pt progressing toward goals. Will continue to monitor. See flowsheets for full assessment and VS info

## 2019-03-30 NOTE — ASSESSMENT & PLAN NOTE
-- Acutely swollen RUE noted on 3/28  -- Omaira and IV removed with partial improvement of swelling.  -- No concern for compartment syndrome per Orthopedics. Appreciate recs.  -- Venous and arterial US unremarkable.  -- Keep limb elevated.  -- Clindamycin 300 q6 x5 days

## 2019-03-30 NOTE — PROGRESS NOTES
Called Norah MITCHELL, [place] re: Chemistry results. Na 135, K-3.4 and Ca- 7.5    Ok to use Na-135, 4K bath and 3.0 Ca bath for tonight's hd treatment.

## 2019-03-30 NOTE — ASSESSMENT & PLAN NOTE
-- Noted on the R groin  -- Noted on 3/28 with no interval expansion  -- If hematoma starts to expand, will get US to look for pseudoaneurysm and possibly hold heparin gtt.

## 2019-03-30 NOTE — PROGRESS NOTES
HD ended and tolerated well. 3hrs x NO UF as ordered. Needles removed w/o bleeding issues (on Systemic Heparin).     Report given to primary nurse.

## 2019-03-30 NOTE — ASSESSMENT & PLAN NOTE
-- Transferred from [facility] for possible C but patient developed a L PCA infarct shortly after admission.  -- Cardiology consulted on arrival, appreciate recs.  -- No intervention for now, recommending maximal medical therapy  -- On heparin gtt for Afib. Started bridge to coumadin on 3/27.  -- Coreg decreased back to 25 BID given persistent hypotension on 50 BID dosing which correlated with worsening RSW.   -- ASA held temporarily due to decrease in platelets. No concern for HIT as of now. If continues to drop, will hold heparin gtt.  -- Continue Atorvastatin 80

## 2019-03-30 NOTE — PROGRESS NOTES
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 Patient was admitted to Hospital Medicine 3/22 for evaluation of unstable angina. On 3/24 @ 1000, the patient's daughter came to visit and she noticed him lethargic, disoriented which soon progressed to include facial droop and dysarthria. A sroke code was called in response to new onset R weakness and dysarthria. MRI was completed noting Acute L PCA infarct. Per Vascular Neuro staff, the symptoms improved while supine during imaging. The patient was subsequently transferred to [facility] for further management. The patient does have a history of coumadin and ASA use. During admission he was placed on a heparin drip for Afib but it was quickly discontinued after the patient began to experience feeling of euphoria which were resolved with discontinuation of heparin gtt.              Hospital Course: 3/25: NAEO.   3/26 Had few beats of Vtach this am   3/27: NAEO  3/28: NAEO  03/28/2019 RUE appeared swollen and tender with worsening weakness. Repeat CT with no new infarct or hemorrhage. [place] and IV removed. US RUE ordered, orthopedics consulted. EEG pending.   03/29/2019 R groin hematoma noted on exam. ASA held for drop in platelets. Coreg decreased.  03/30/2019 Added clindamycin. Step down to VN      Review of Systems   Constitutional: Negative for fever.   HENT: Negative for trouble swallowing.    Neurological: Positive for facial asymmetry, speech difficulty and weakness.     Objective:     Vitals:  Temp: 98 °F (36.7 °C)  Pulse: 82  Rhythm: atrial  rhythm  BP: (!) 109/45  MAP (mmHg): 64  Resp: (!) 22  SpO2: 100 %  O2 Device (Oxygen Therapy): room air    Temp  Min: 98 °F (36.7 °C)  Max: 99.3 °F (37.4 °C)  Pulse  Min: 66  Max: 85  BP  Min: 91/67  Max: 151/62  MAP (mmHg)  Min: 64  Max: 96  Resp  Min: 11  Max: 26  SpO2  Min: 96 %  Max: 100 %    03/29 0701 - 03/30 0700  In: 505.9 [I.V.:305.9]  Out: 1335 [Urine:835]   Unmeasured Output  Urine Occurrence: 1  Stool Occurrence: 0  Pad Count: 1       Physical Exam   Constitutional: No distress.   Eyes: Pupils are equal, round, and reactive to light.   Neurological: He is alert.   [id number]  Alert. Oriented to person only.  Dysarthric+. Follows simple commands.  Pupils reactive bilaterally.  R facial droop  RUE - 0/5  RLE - 1/5  LUE - 5/5  LLE - 3/5   Nursing note and vitals reviewed.    Medications:  Continuous  heparin (porcine) in D5W Last Rate: 14.018 Units/kg/hr (03/30/19 1502)   Scheduled  sodium chloride 0.9%  Once   allopurinol 100 mg Daily   atorvastatin 80 mg Daily   carvedilol 25 mg BID WM   clindamycin 300 mg Q6H   finasteride 5 mg Daily   gabapentin 300 mg QHS   insulin detemir U-100 8 Units Daily   losartan 100 mg QHS   pantoprazole 40 mg Daily   polyethylene glycol 17 g Daily   senna-docusate 8.6-50 mg 1 tablet BID   tamsulosin 1 capsule Daily   torsemide 20 mg BID   warfarin 5 mg Daily   PRN  sodium chloride 0.9%  PRN   acetaminophen 650 mg Q6H PRN   albuterol-ipratropium 3 mL Q4H PRN   dextrose 50% 12.5 g PRN   dextrose 50% 25 g PRN   glucagon (human recombinant) 1 mg PRN   glucose 16 g PRN   glucose 24 g PRN   heparin (PORCINE) 30 Units/kg (Adjusted) PRN   heparin (PORCINE) 44.2 Units/kg (Adjusted) PRN   insulin aspart U-100 1-10 Units QID (AC + HS) PRN   nitroGLYCERIN 0.4 mg Q5 Min PRN   ondansetron 8 mg Q6H PRN     Today I personally reviewed pertinent medications, lines/drains/airways, imaging, cardiology results, laboratory results, notably:    Diet  Diet Dysphagia Mechanical Soft (IDDSI Level 5)  [facility]; Cardiac (Low Na/Chol), Coumadin Restriction, Renal; Nectar Thick  Diet Dysphagia Mechanical Soft (IDDSI Level 5) [facility]; Cardiac (Low Na/Chol), Coumadin Restriction, Renal; Nectar Thick      Assessment/Plan:     Neuro  * 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 CTA with L vert and PCA stenosis along with bilateral ICA stenosis R>L  MRI from 3/24 with L thalamic stroke. MRI from 3/26 with evolution of L thalamic stroke and multiple new new infarcts in the L PCa territory.     -- Etiology cardioembolic vs atheroembolic.  -- No tPA  -- Atorvastatin 80mg. Holding ASA temporarily due to thrombocytopenia.  -- Heparin gtt for Afib. Bridging to Coumadin.  -- Neuro checks q1  -- Goal SBP<180 (completed stroke on most recent MRI).  --  PT/OT  -- TTE with bi atrial enlargement and PFO (L-->R shunt).  -- US LE with nearly occlusive thrombus in the peroneal veins.  -- STAT CT head on 3/28 done for acute worsening of RSW. No acute hemorrhage or new ischemic stroke noted.  -- Appreciate vascular neurology recs.         Encephalopathy  -- Fluctuating mental status.  -- Similar episode 2 nights ago with improvement by the next morning. Sundowning?  -- EEG with generalized slowing and no electrographic seizures recorded.    Pulmonary  Severe persistent asthma with exacerbation  -- Started on Azithromycin and steroids at OSH due to concern for Asthma.  -- Pulmonology consulted, no concern for asthma. Appreciate recs.  -- Abx and steroids d/derrick.  -- Duonebs PRN    Cardiac/Vascular  Paroxysmal atrial fibrillation  -- Rate controlled  -- Heparin gtt, bridging to Coumadin.  -- INR 1.1, may increase Coumadin to 10mg tomorrow.    NSTEMI (non-ST elevated myocardial infarction)  -- Transferred from [facility] for [facility] but patient developed a L PCA infarct shortly after admission.  --  Cardiology consulted on arrival, appreciate recs.  -- No intervention for now, recommending maximal medical therapy  -- On heparin gtt for Afib. Started bridge to coumadin on 3/27.  -- Coreg decreased back to 25 BID given persistent hypotension on 50 BID dosing which correlated with worsening RSW.   -- ASA held temporarily due to decrease in platelets. No concern for HIT as of now. If continues to drop, will hold heparin gtt.  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    CAD (coronary artery disease), 2V CABG 2007  -- H/o CAD/ CABG 2007  -- Continue Atorvastatin 80. ASA held today give drop in platelets from 101-->80s  -- Heparin gtt. Intiated bridge to coumadin on 3/27.  -- Appreciate cardiology recs.      Hyperlipidemia, baseline   -- LDL 46  -- High intensity statin for carotid stenosis and L PCA stroke.    Essential hypertension  -- Target SBP<180    Carotid artery stenosis  -- No surgical intervention per vascular surgery recs. Medical management for now      Renal/  ESRD (end stage renal disease) on dialysis  -- H/o ESRD on HD (MWF)  -- LUE AVF+. Ultrasound on 3/25 showed patency of the fistula site.  -- Nephrology following, appreciate recs.  -- Noted to have mild oozing from fistula site on 3/28. No tenderness/swelling. H/h stable. Resolved as of 3/29.    Benign prostatic hyperplasia without lower urinary tract symptoms  -- Continue Finasteride and Flomax.    Hematology  Long term (current) use of anticoagulants  -- Continue heparin gtt  -- Started coumadin bridge.       Oncology  Anemia of chronic disease  -- H/h stable.  -- On heparin gtt  -- Daily CBC    Endocrine  Type 2 diabetes mellitus, without long-term current use of insulin  -- A1c 8.3  -- Lev 8 qHS + SSI AC&HS PRN  -- POC AC&HS    Orthopedic  Groin hematoma  -- Noted on the R groin  -- Noted on 3/28 with no interval expansion  --  If hematoma starts to expand, will get US to look for pseudoaneurysm and possibly hold heparin gtt.    Forearm swelling  -- Acutely swollen RUE noted on 3/28  -- Omaira and IV removed with partial improvement of swelling.  -- No concern for compartment syndrome per Orthopedics. Appreciate recs.  -- Venous and arterial US unremarkable.  -- Keep limb elevated.  -- Clindamycin 300 q6 x5 days    Gout, arthritis  -- Allopurinol 100 daily    Other  SOB (shortness of breath)  -- Currently on RA          The patient is being Prophylaxed for:  Venous Thromboembolism with: Chemical  Stress Ulcer with: None  Ventilator Pneumonia with: none    Activity Orders          Diet Dysphagia Mechanical Soft (IDDSI Level 5) [facility]; Cardiac (Low Na/Chol), Coumadin Restriction, Renal; Nectar Thick: Dysphagia 2 (Mechanical Soft Ground) starting at 03/29 1144        Full Code    Ethan Costello MD  Neurocritical Care  [facility]

## 2019-03-30 NOTE — ASSESSMENT & PLAN NOTE
-- Rate controlled  -- Heparin gtt, bridging to Coumadin.  -- INR 1.1, may increase Coumadin to 10mg tomorrow.

## 2019-03-30 NOTE — PLAN OF CARE
Problem: Adult Inpatient Plan of Care  Goal: Plan of Care Review  Past Medical History:  7/29/2016: NICK (acute kidney injury)  No date: Allergy  12/15/2014: Anemia, mild  No date: Arthritis      Comment:  Gout  3/27/2012: Benign essential HTN  5/10/2018: BMI 29.0-29.9,adult  No date: BPH (benign prostatic hyperplasia)  No date: BPH (benign prostatic hyperplasia)  2006: CAD (coronary artery disease)  No date: Chronic kidney disease      Comment:  due to ibuprofen  No date: Colon polyp  No date: CRF (chronic renal failure), stage 5  No date: Diverticulosis  No date: Gastritis  No date: GERD (gastroesophageal reflux disease)  No date: Gout  5/3/2018: History of colon polyps  3/27/2012: HTN (hypertension)  No date: Hyperlipidemia  No date: Hyperlipidemia  6/14/2018: LLL pneumonia  No date: LVH (left ventricular hypertrophy)  No date: Mesenteric ischemia  3/27/2012: Murmur, cardiac  No date: GRICELDA (obstructive sleep apnea)      Comment:  DOES NOT USE A MACHINE  No date: Sinus problem  No date: Syncope and collapse      Past Surgical History:  No date: APPENDECTOMY  5/31/2016: BLOCK-NERVE; Left      Comment:  Performed by Kevin Leon MD at [facility] OR  No date: CARDIAC CATHETERIZATION  2011: COLONOSCOPY  5/3/2018: COLONOSCOPY; N/A      Comment:  Performed by Messi Harris MD at [facility] ENDO  9/10/2015: COLONOSCOPY; N/A      Comment:  Performed by Messi Harris MD at [facility] ENDO  4/2007: CORONARY ARTERY BYPASS GRAFT      Comment:  x 1  8/30/2017: CYSTOSCOPY; N/A      Comment:  Performed by Rudy Herring MD at [facility] OR  11/10/2015: CYSTOSCOPY; N/A      Comment:  Performed by Rudy Herring MD at [facility] OR  1/4/2016: ESOPHAGOGASTRODUODENOSCOPY (EGD); N/A      Comment:  Performed by Tra Brooks MD at [facility] ENDO (2ND FLR)  10/15/2014: ESOPHAGOGASTRODUODENOSCOPY (EGD); N/A      Comment:  Performed by Messi Harris MD at [facility] ENDO  2/25/2016: INJECTION-STEROID-EPIDURAL-TRANSFORAMINAL; Left      Comment:  Performed by  Kevin Leon MD at [facility] OR  No date: JOINT REPLACEMENT      Comment:  left knee total replacement  X 3  No date: mid leftt finger      Comment:  from a cactelkin  2016: MOLE REMOVAL  4/11/2016: RADIOFREQUENCY THERMOCOAGULATION (RFTC)-NERVE-MEDIAN   BRANCH-LUMBAR; Left      Comment:  Performed by Kevin Leon MD at [facility] OR  No date: rotative cuff      Comment:  no rotative cuffs on bilat shoulders has pins   No date: SHOULDER SURGERY      Comment:  shoulder surgery bilat  RIGHT X 4; LEFT X 3  11/10/2015: TRANSRECTAL ULTRASOUND GUIDED PROSTATE BIOPSY; Bilateral      Comment:  Performed by Rudy Herring MD at [facility] OR  5/31/2017: ULTRASOUND-ENDOSCOPIC-UPPER; N/A      Comment:  Performed by Tra Brooks MD at [facility] ENDO (2ND FLR)  1/4/2016: ULTRASOUND-ENDOSCOPIC-UPPER; N/A      Comment:  Performed by Tra Brooks MD at [facility] ENDO (2ND FLR)  1/16/2015: ULTRASOUND-ENDOSCOPIC-UPPER; N/A      Comment:  Performed by Jason Saleem MD at [facility] ENDO (2ND FLR)    Patient likes blankets on.    POC reviewed with pt and family at 1500. Pt family verbalized understanding. Questions and concerns addressed. No acute events today. Pt has transfer orders and waiting for bed placement. Pt progressing toward goals. Will continue to monitor. See flowsheets for full assessment and VS info.

## 2019-03-30 NOTE — PLAN OF CARE
Problem: Adult Inpatient Plan of Care  Goal: Plan of Care Review  Past Medical History:  7/29/2016: NICK (acute kidney injury)  No date: Allergy  12/15/2014: Anemia, mild  No date: Arthritis      Comment:  Gout  3/27/2012: Benign essential HTN  5/10/2018: BMI 29.0-29.9,adult  No date: BPH (benign prostatic hyperplasia)  No date: BPH (benign prostatic hyperplasia)  2006: CAD (coronary artery disease)  No date: Chronic kidney disease      Comment:  due to ibuprofen  No date: Colon polyp  No date: CRF (chronic renal failure), stage 5  No date: Diverticulosis  No date: Gastritis  No date: GERD (gastroesophageal reflux disease)  No date: Gout  5/3/2018: History of colon polyps  3/27/2012: HTN (hypertension)  No date: Hyperlipidemia  No date: Hyperlipidemia  6/14/2018: LLL pneumonia  No date: LVH (left ventricular hypertrophy)  No date: Mesenteric ischemia  3/27/2012: Murmur, cardiac  No date: GRICELDA (obstructive sleep apnea)      Comment:  DOES NOT USE A MACHINE  No date: Sinus problem  No date: Syncope and collapse      Past Surgical History:  No date: APPENDECTOMY  5/31/2016: BLOCK-NERVE; Left      Comment:  Performed by Kevin Leon MD at [facility] OR  No date: CARDIAC CATHETERIZATION  2011: COLONOSCOPY  5/3/2018: COLONOSCOPY; N/A      Comment:  Performed by Messi Harris MD at [facility] ENDO  9/10/2015: COLONOSCOPY; N/A      Comment:  Performed by Messi Harris MD at [facility] ENDO  4/2007: CORONARY ARTERY BYPASS GRAFT      Comment:  x 1  8/30/2017: CYSTOSCOPY; N/A      Comment:  Performed by Rudy Herring MD at [facility] OR  11/10/2015: CYSTOSCOPY; N/A      Comment:  Performed by Rudy Herring MD at [facility] OR  1/4/2016: ESOPHAGOGASTRODUODENOSCOPY (EGD); N/A      Comment:  Performed by Tra Brooks MD at [facility] ENDO (2ND FLR)  10/15/2014: ESOPHAGOGASTRODUODENOSCOPY (EGD); N/A      Comment:  Performed by Messi Harris MD at [facility] ENDO  2/25/2016: INJECTION-STEROID-EPIDURAL-TRANSFORAMINAL; Left      Comment:  Performed by  Kevin Leon MD at [facility] OR  No date: JOINT REPLACEMENT      Comment:  left knee total replacement  X 3  No date: mid leftt finger      Comment:  from a donovantelkin  2016: MOLE REMOVAL  4/11/2016: RADIOFREQUENCY THERMOCOAGULATION (RFTC)-NERVE-MEDIAN   BRANCH-LUMBAR; Left      Comment:  Performed by Kevin Leon MD at [facility] OR  No date: rotative cuff      Comment:  no rotative cuffs on bilat shoulders has pins   No date: SHOULDER SURGERY      Comment:  shoulder surgery bilat  RIGHT X 4; LEFT X 3  11/10/2015: TRANSRECTAL ULTRASOUND GUIDED PROSTATE BIOPSY; Bilateral      Comment:  Performed by Rudy Herring MD at [facility] OR  5/31/2017: ULTRASOUND-ENDOSCOPIC-UPPER; N/A      Comment:  Performed by Tra Brooks MD at [facility] ENDO (2ND FLR)  1/4/2016: ULTRASOUND-ENDOSCOPIC-UPPER; N/A      Comment:  Performed by Tra Brooks MD at [facility] ENDO (2ND FLR)  1/16/2015: ULTRASOUND-ENDOSCOPIC-UPPER; N/A      Comment:  Performed by Jason Saleem MD at [facility] ENDO (2ND FLR)    Patient likes blankets on.    Outcome: Ongoing (interventions implemented as appropriate)  No acute events occurred throughout shift. R UA mindline placed, heparin gtt remains therapeutic. R UE remains elevated on pillows throughout the shift. Pt with transfer orders. Plan for HD tonight. POC reviewed with Micheal Hunt and Micheal Hunt's family.  Addressed all questions and concerns.  Pt progressing toward goals as noted.  See flowsheets for full list of VS and assessments. Will continue to monitor.

## 2019-03-30 NOTE — PLAN OF CARE
Problem: Occupational Therapy Goal  Goal: Occupational Therapy Goal  Goals to be met by:  4/9/19    Patient will increase functional independence with ADLs by performing:    UE Dressing with Moderate Assistance using nikita-dressing technique.  Grooming while EOB with Moderate Assistance.  Rolling to Right with minimal assistance  Rolling to Left with Maximum Assistance.   Supine to sit with Maximum Assistance.  Toilet transfer to bedside commode with Maximum Assistance.  Pt/family demo and verbalized understanding of  BUE HEP and proper positioning in bed to prevent edema, contractures, and pressure ulcers.  Pt follow 100% of simple one-step commands with minimal cues provided.      Outcome: Ongoing (interventions implemented as appropriate)  Evaluation completed. Initiate POC.   Lucinda goncalves OT  3/30/2019

## 2019-03-31 PROBLEM — R71.0 HEMOGLOBIN DROP: Status: ACTIVE | Noted: 2019-03-31

## 2019-03-31 LAB
ABO + RH BLD: NORMAL
ALBUMIN SERPL BCP-MCNC: 2.6 G/DL (ref 3.5–5.2)
ALP SERPL-CCNC: 83 U/L (ref 55–135)
ALT SERPL W/O P-5'-P-CCNC: 27 U/L (ref 10–44)
ANION GAP SERPL CALC-SCNC: 10 MMOL/L (ref 8–16)
APTT BLDCRRT: 57.5 SEC (ref 21–32)
APTT BLDCRRT: 59.3 SEC (ref 21–32)
AST SERPL-CCNC: 20 U/L (ref 10–40)
BASOPHILS # BLD AUTO: 0.01 K/UL (ref 0–0.2)
BASOPHILS NFR BLD: 0.1 % (ref 0–1.9)
BILIRUB SERPL-MCNC: 0.7 MG/DL (ref 0.1–1)
BLD GP AB SCN CELLS X3 SERPL QL: NORMAL
BLD PROD TYP BPU: NORMAL
BLOOD UNIT EXPIRATION DATE: NORMAL
BLOOD UNIT TYPE CODE: 7300
BLOOD UNIT TYPE: NORMAL
BUN SERPL-MCNC: 46 MG/DL (ref 8–23)
CALCIUM SERPL-MCNC: 7.8 MG/DL (ref 8.7–10.5)
CHLORIDE SERPL-SCNC: 110 MMOL/L (ref 95–110)
CO2 SERPL-SCNC: 19 MMOL/L (ref 23–29)
CODING SYSTEM: NORMAL
CREAT SERPL-MCNC: 5.3 MG/DL (ref 0.5–1.4)
DIFFERENTIAL METHOD: ABNORMAL
DISPENSE STATUS: NORMAL
EOSINOPHIL # BLD AUTO: 0.1 K/UL (ref 0–0.5)
EOSINOPHIL NFR BLD: 1.2 % (ref 0–8)
ERYTHROCYTE [DISTWIDTH] IN BLOOD BY AUTOMATED COUNT: 15.3 % (ref 11.5–14.5)
EST. GFR  (AFRICAN AMERICAN): 11 ML/MIN/1.73 M^2
EST. GFR  (NON AFRICAN AMERICAN): 9.5 ML/MIN/1.73 M^2
GLUCOSE SERPL-MCNC: 117 MG/DL (ref 70–110)
HCT VFR BLD AUTO: 21.7 % (ref 40–54)
HGB BLD-MCNC: 7.1 G/DL (ref 14–18)
IMM GRANULOCYTES # BLD AUTO: 0.12 K/UL (ref 0–0.04)
IMM GRANULOCYTES NFR BLD AUTO: 1.4 % (ref 0–0.5)
INR PPP: 1.2 (ref 0.8–1.2)
INR PPP: 1.2 (ref 0.8–1.2)
LYMPHOCYTES # BLD AUTO: 1.8 K/UL (ref 1–4.8)
LYMPHOCYTES NFR BLD: 21.6 % (ref 18–48)
MAGNESIUM SERPL-MCNC: 2 MG/DL (ref 1.6–2.6)
MCH RBC QN AUTO: 32 PG (ref 27–31)
MCHC RBC AUTO-ENTMCNC: 32.7 G/DL (ref 32–36)
MCV RBC AUTO: 98 FL (ref 82–98)
MONOCYTES # BLD AUTO: 0.6 K/UL (ref 0.3–1)
MONOCYTES NFR BLD: 6.6 % (ref 4–15)
NEUTROPHILS # BLD AUTO: 5.8 K/UL (ref 1.8–7.7)
NEUTROPHILS NFR BLD: 69.1 % (ref 38–73)
NRBC BLD-RTO: 0 /100 WBC
NUM UNITS TRANS PACKED RBC: NORMAL
PHOSPHATE SERPL-MCNC: 4 MG/DL (ref 2.7–4.5)
PLATELET # BLD AUTO: 101 K/UL (ref 150–350)
PMV BLD AUTO: 10.6 FL (ref 9.2–12.9)
POCT GLUCOSE: 141 MG/DL (ref 70–110)
POCT GLUCOSE: 153 MG/DL (ref 70–110)
POCT GLUCOSE: 163 MG/DL (ref 70–110)
POCT GLUCOSE: 167 MG/DL (ref 70–110)
POTASSIUM SERPL-SCNC: 4.6 MMOL/L (ref 3.5–5.1)
PROT SERPL-MCNC: 4.9 G/DL (ref 6–8.4)
PROTHROMBIN TIME: 12.1 SEC (ref 9–12.5)
PROTHROMBIN TIME: 12.1 SEC (ref 9–12.5)
RBC # BLD AUTO: 2.22 M/UL (ref 4.6–6.2)
SODIUM SERPL-SCNC: 139 MMOL/L (ref 136–145)
WBC # BLD AUTO: 8.37 K/UL (ref 3.9–12.7)

## 2019-03-31 PROCEDURE — 25000003 PHARM REV CODE 250: Performed by: INTERNAL MEDICINE

## 2019-03-31 PROCEDURE — 85730 THROMBOPLASTIN TIME PARTIAL: CPT

## 2019-03-31 PROCEDURE — 86920 COMPATIBILITY TEST SPIN: CPT

## 2019-03-31 PROCEDURE — 20600001 HC STEP DOWN PRIVATE ROOM

## 2019-03-31 PROCEDURE — 85730 THROMBOPLASTIN TIME PARTIAL: CPT | Mod: 91

## 2019-03-31 PROCEDURE — 63600175 PHARM REV CODE 636 W HCPCS: Performed by: STUDENT IN AN ORGANIZED HEALTH CARE EDUCATION/TRAINING PROGRAM

## 2019-03-31 PROCEDURE — 36430 TRANSFUSION BLD/BLD COMPNT: CPT

## 2019-03-31 PROCEDURE — 86901 BLOOD TYPING SEROLOGIC RH(D): CPT

## 2019-03-31 PROCEDURE — P9016 RBC LEUKOCYTES REDUCED: HCPCS

## 2019-03-31 PROCEDURE — 83735 ASSAY OF MAGNESIUM: CPT

## 2019-03-31 PROCEDURE — 84100 ASSAY OF PHOSPHORUS: CPT

## 2019-03-31 PROCEDURE — 80053 COMPREHEN METABOLIC PANEL: CPT

## 2019-03-31 PROCEDURE — 25000003 PHARM REV CODE 250: Performed by: HOSPITALIST

## 2019-03-31 PROCEDURE — 99233 PR SUBSEQUENT HOSPITAL CARE,LEVL III: ICD-10-PCS | Mod: GC,,, | Performed by: PSYCHIATRY & NEUROLOGY

## 2019-03-31 PROCEDURE — 36415 COLL VENOUS BLD VENIPUNCTURE: CPT

## 2019-03-31 PROCEDURE — 85610 PROTHROMBIN TIME: CPT

## 2019-03-31 PROCEDURE — S5571 INSULIN DISPOS PEN 3 ML: HCPCS | Performed by: STUDENT IN AN ORGANIZED HEALTH CARE EDUCATION/TRAINING PROGRAM

## 2019-03-31 PROCEDURE — 25000003 PHARM REV CODE 250: Performed by: STUDENT IN AN ORGANIZED HEALTH CARE EDUCATION/TRAINING PROGRAM

## 2019-03-31 PROCEDURE — 85025 COMPLETE CBC W/AUTO DIFF WBC: CPT

## 2019-03-31 PROCEDURE — 99233 SBSQ HOSP IP/OBS HIGH 50: CPT | Mod: GC,,, | Performed by: PSYCHIATRY & NEUROLOGY

## 2019-03-31 RX ORDER — HYDROCODONE BITARTRATE AND ACETAMINOPHEN 500; 5 MG/1; MG/1
TABLET ORAL
Status: DISCONTINUED | OUTPATIENT
Start: 2019-03-31 | End: 2019-04-09 | Stop reason: HOSPADM

## 2019-03-31 RX ADMIN — ALLOPURINOL 100 MG: 100 TABLET ORAL at 09:03

## 2019-03-31 RX ADMIN — POLYETHYLENE GLYCOL 3350 17 G: 17 POWDER, FOR SOLUTION ORAL at 09:03

## 2019-03-31 RX ADMIN — WARFARIN SODIUM 7.5 MG: 2.5 TABLET ORAL at 05:03

## 2019-03-31 RX ADMIN — STANDARDIZED SENNA CONCENTRATE AND DOCUSATE SODIUM 1 TABLET: 8.6; 5 TABLET, FILM COATED ORAL at 09:03

## 2019-03-31 RX ADMIN — STANDARDIZED SENNA CONCENTRATE AND DOCUSATE SODIUM 1 TABLET: 8.6; 5 TABLET, FILM COATED ORAL at 10:03

## 2019-03-31 RX ADMIN — CLINDAMYCIN HYDROCHLORIDE 300 MG: 150 CAPSULE ORAL at 05:03

## 2019-03-31 RX ADMIN — TORSEMIDE 20 MG: 20 TABLET ORAL at 09:03

## 2019-03-31 RX ADMIN — CLINDAMYCIN HYDROCHLORIDE 300 MG: 150 CAPSULE ORAL at 06:03

## 2019-03-31 RX ADMIN — INSULIN DETEMIR 8 UNITS: 100 INJECTION, SOLUTION SUBCUTANEOUS at 09:03

## 2019-03-31 RX ADMIN — TORSEMIDE 20 MG: 20 TABLET ORAL at 10:03

## 2019-03-31 RX ADMIN — LOSARTAN POTASSIUM 100 MG: 50 TABLET, FILM COATED ORAL at 10:03

## 2019-03-31 RX ADMIN — CLINDAMYCIN HYDROCHLORIDE 300 MG: 150 CAPSULE ORAL at 11:03

## 2019-03-31 RX ADMIN — CLINDAMYCIN HYDROCHLORIDE 300 MG: 150 CAPSULE ORAL at 12:03

## 2019-03-31 RX ADMIN — CARVEDILOL 25 MG: 25 TABLET, FILM COATED ORAL at 08:03

## 2019-03-31 RX ADMIN — TAMSULOSIN HYDROCHLORIDE 0.4 MG: 0.4 CAPSULE ORAL at 09:03

## 2019-03-31 RX ADMIN — GABAPENTIN 300 MG: 300 CAPSULE ORAL at 10:03

## 2019-03-31 RX ADMIN — ATORVASTATIN CALCIUM 80 MG: 20 TABLET, FILM COATED ORAL at 10:03

## 2019-03-31 RX ADMIN — PANTOPRAZOLE SODIUM 40 MG: 40 TABLET, DELAYED RELEASE ORAL at 09:03

## 2019-03-31 RX ADMIN — ACETAMINOPHEN 650 MG: 325 TABLET ORAL at 10:03

## 2019-03-31 RX ADMIN — CARVEDILOL 25 MG: 25 TABLET, FILM COATED ORAL at 05:03

## 2019-03-31 RX ADMIN — FINASTERIDE 5 MG: 5 TABLET, FILM COATED ORAL at 09:03

## 2019-03-31 RX ADMIN — ACETAMINOPHEN 650 MG: 325 TABLET ORAL at 11:03

## 2019-03-31 NOTE — ASSESSMENT & PLAN NOTE
79 y/o M with multiple co morbidities (A.fib, HTN, CHF, ESRD, DM), on day #8 of hospitalization in [facility] with L PCA syndrome. Noted to have significant intracranial atherosclerotic disease, likely the etiology, however cardiac emboli is another DDx. Patient with fluctuating RUE weakness, dysarthria and aphasia, found to have new infarcts in L PCA territory on MRI of Brain 3/26.     - Antithrombotics for secondary stroke prevention: Anticoagulants: low intensity Heparin gtt bridge to Warfarin, INR 1.1  - Statins: Atorvastatin- 80 mg daily  - Aggressive risk factor modification: HTN, DM, HLD, A-Fib, CAD  - Rehab efforts: PT/OT/SLP to evaluate and treat ->recommending Rehab  - Diagnostics ordered/pending: suggest NPO and further evaluation with SLP   - VTE prophylaxis: Heparin gtt and warfarin   - BP parameters: Infarct: No intervention, SBP <220   - plans to step down to vascular neurology

## 2019-03-31 NOTE — SUBJECTIVE & OBJECTIVE
Neurologic Chief Complaint: RSW + difficulty with speech    Subjective:     Interval History: Patient is seen for follow-up neurological assessment and treatment recommendations: stepped down to VN last night. Hbg gradually dropping (7.1 this morning). discontinued heparin gtt and transfusing with pRBC x1. INR with slight increase from 1.1 to 1.2, increased warfarin to 7.5.    HPI, Past Medical, Family, and Social History remains the same as documented in the initial encounter.     Review of Systems   Unable to perform ROS: Mental status change   Constitutional: Negative for diaphoresis and fever.   HENT: Negative for drooling, trouble swallowing and voice change.    Eyes: Positive for visual disturbance.   Respiratory: Negative for choking and shortness of breath.    Cardiovascular: Negative for palpitations.   Gastrointestinal: Negative for abdominal pain, nausea and vomiting.   Endocrine: Negative.    Skin: Negative.    Allergic/Immunologic: Negative.  Negative for immunocompromised state.   Neurological: Positive for facial asymmetry, speech difficulty and weakness. Negative for seizures and light-headedness.   Psychiatric/Behavioral: Positive for confusion. Negative for agitation.     Scheduled Meds:   sodium chloride 0.9%   Intravenous Once    allopurinol  100 mg Oral Daily    atorvastatin  80 mg Oral Daily    carvedilol  25 mg Oral BID WM    clindamycin  300 mg Oral Q6H    finasteride  5 mg Oral Daily    gabapentin  300 mg Oral QHS    insulin detemir U-100  8 Units Subcutaneous Daily    losartan  100 mg Oral QHS    pantoprazole  40 mg Oral Daily    polyethylene glycol  17 g Oral Daily    senna-docusate 8.6-50 mg  1 tablet Oral BID    tamsulosin  1 capsule Oral Daily    torsemide  20 mg Oral BID    warfarin  5 mg Oral Daily     Continuous Infusions:   heparin (porcine) in D5W 14 Units/kg/hr (03/30/19 0663)     PRN Meds:sodium chloride 0.9%, acetaminophen, albuterol-ipratropium, dextrose 50%,  dextrose 50%, glucagon (human recombinant), glucose, glucose, heparin (PORCINE), heparin (PORCINE), insulin aspart U-100, nitroGLYCERIN, ondansetron    Objective:     Vital Signs (Most Recent):  Temp: 98.2 °F (36.8 °C) (03/31/19 0316)  Pulse: 95 (03/31/19 0700)  Resp: 18 (03/31/19 0316)  BP: (!) 106/51 (03/31/19 0316)  SpO2: 96 % (03/31/19 0316)  BP Location: Right leg    Vital Signs Range (Last 24H):  Temp:  [97.4 °F (36.3 °C)-98.4 °F (36.9 °C)]   Pulse:  [71-95]   Resp:  [13-22]   BP: ()/(45-83)   SpO2:  [93 %-100 %]   BP Location: Right leg    Physical Exam   Constitutional: He appears well-developed and well-nourished. He is cooperative. He appears ill. No distress.   HENT:   Head: Normocephalic and atraumatic.   Eyes: Pupils are equal, round, and reactive to light.   Neck: Normal range of motion.   Cardiovascular: Normal rate. An irregularly irregular rhythm present.   Pulmonary/Chest: Effort normal. No accessory muscle usage. No respiratory distress.   Abdominal: Soft.   Genitourinary:   Genitourinary Comments: Hematoma in the R groin, extending into scrotum   Musculoskeletal: He exhibits no edema.   Swelling has decreased, however TTP on R midline site   Neurological: He is alert. He displays no tremor. He displays no seizure activity.   Skin: Skin is warm. He is not diaphoretic.   Psychiatric: His behavior is normal.   Nursing note and vitals reviewed.    Neurological Exam:   LOC: drowsy, yet arousable with tactile stimuli  Language and articulation: notable aphasia, dysarthria    Visual Fields: R superior quadrantanopsia   Pupils (CN II, III): PERRL  Motor: antigravity in RUE, limited due to pain.     Laboratory:  Recent Results (from the past 24 hour(s))   POCT glucose    Collection Time: 03/30/19  8:40 AM   Result Value Ref Range    POCT Glucose 144 (H) 70 - 110 mg/dL   CBC auto differential    Collection Time: 03/30/19  8:41 AM   Result Value Ref Range    WBC 7.10 3.90 - 12.70 K/uL    RBC 2.47 (L)  4.60 - 6.20 M/uL    Hemoglobin 7.9 (L) 14.0 - 18.0 g/dL    Hematocrit 23.6 (L) 40.0 - 54.0 %    MCV 96 82 - 98 fL    MCH 32.0 (H) 27.0 - 31.0 pg    MCHC 33.5 32.0 - 36.0 g/dL    RDW 15.2 (H) 11.5 - 14.5 %    Platelets 90 (L) 150 - 350 K/uL    MPV 11.4 9.2 - 12.9 fL    Immature Granulocytes 1.1 (H) 0.0 - 0.5 %    Gran # (ANC) 5.3 1.8 - 7.7 K/uL    Immature Grans (Abs) 0.08 (H) 0.00 - 0.04 K/uL    Lymph # 1.3 1.0 - 4.8 K/uL    Mono # 0.4 0.3 - 1.0 K/uL    Eos # 0.1 0.0 - 0.5 K/uL    Baso # 0.00 0.00 - 0.20 K/uL    nRBC 0 0 /100 WBC    Gran% 74.6 (H) 38.0 - 73.0 %    Lymph% 17.7 (L) 18.0 - 48.0 %    Mono% 5.6 4.0 - 15.0 %    Eosinophil% 1.0 0.0 - 8.0 %    Basophil% 0.0 0.0 - 1.9 %    Differential Method Automated    APTT    Collection Time: 03/30/19 11:55 AM   Result Value Ref Range    aPTT 69.8 (H) 21.0 - 32.0 sec   POCT glucose    Collection Time: 03/30/19  2:00 PM   Result Value Ref Range    POCT Glucose 155 (H) 70 - 110 mg/dL   APTT    Collection Time: 03/30/19  5:22 PM   Result Value Ref Range    aPTT 53.0 (H) 21.0 - 32.0 sec   POCT glucose    Collection Time: 03/30/19  5:52 PM   Result Value Ref Range    POCT Glucose 147 (H) 70 - 110 mg/dL   POCT glucose    Collection Time: 03/30/19  9:47 PM   Result Value Ref Range    POCT Glucose 187 (H) 70 - 110 mg/dL   APTT    Collection Time: 03/31/19 12:34 AM   Result Value Ref Range    aPTT 59.3 (H) 21.0 - 32.0 sec   Comprehensive metabolic panel    Collection Time: 03/31/19  6:24 AM   Result Value Ref Range    Sodium 139 136 - 145 mmol/L    Potassium 4.6 3.5 - 5.1 mmol/L    Chloride 110 95 - 110 mmol/L    CO2 19 (L) 23 - 29 mmol/L    Glucose 117 (H) 70 - 110 mg/dL    BUN, Bld 46 (H) 8 - 23 mg/dL    Creatinine 5.3 (H) 0.5 - 1.4 mg/dL    Calcium 7.8 (L) 8.7 - 10.5 mg/dL    Total Protein 4.9 (L) 6.0 - 8.4 g/dL    Albumin 2.6 (L) 3.5 - 5.2 g/dL    Total Bilirubin 0.7 0.1 - 1.0 mg/dL    Alkaline Phosphatase 83 55 - 135 U/L    AST 20 10 - 40 U/L    ALT 27 10 - 44 U/L    Anion  Gap 10 8 - 16 mmol/L    eGFR if African American 11.0 (A) >60 mL/min/1.73 m^2    eGFR if non African American 9.5 (A) >60 mL/min/1.73 m^2   Magnesium    Collection Time: 03/31/19  6:24 AM   Result Value Ref Range    Magnesium 2.0 1.6 - 2.6 mg/dL   Phosphorus    Collection Time: 03/31/19  6:24 AM   Result Value Ref Range    Phosphorus 4.0 2.7 - 4.5 mg/dL   Protime-INR    Collection Time: 03/31/19  6:24 AM   Result Value Ref Range    Prothrombin Time 12.1 9.0 - 12.5 sec    INR 1.2 0.8 - 1.2   CBC auto differential    Collection Time: 03/31/19  6:24 AM   Result Value Ref Range    WBC 8.37 3.90 - 12.70 K/uL    RBC 2.22 (L) 4.60 - 6.20 M/uL    Hemoglobin 7.1 (L) 14.0 - 18.0 g/dL    Hematocrit 21.7 (L) 40.0 - 54.0 %    MCV 98 82 - 98 fL    MCH 32.0 (H) 27.0 - 31.0 pg    MCHC 32.7 32.0 - 36.0 g/dL    RDW 15.3 (H) 11.5 - 14.5 %    Platelets 101 (L) 150 - 350 K/uL    MPV 10.6 9.2 - 12.9 fL    Immature Granulocytes 1.4 (H) 0.0 - 0.5 %    Gran # (ANC) 5.8 1.8 - 7.7 K/uL    Immature Grans (Abs) 0.12 (H) 0.00 - 0.04 K/uL    Lymph # 1.8 1.0 - 4.8 K/uL    Mono # 0.6 0.3 - 1.0 K/uL    Eos # 0.1 0.0 - 0.5 K/uL    Baso # 0.01 0.00 - 0.20 K/uL    nRBC 0 0 /100 WBC    Gran% 69.1 38.0 - 73.0 %    Lymph% 21.6 18.0 - 48.0 %    Mono% 6.6 4.0 - 15.0 %    Eosinophil% 1.2 0.0 - 8.0 %    Basophil% 0.1 0.0 - 1.9 %    Differential Method Automated    APTT    Collection Time: 03/31/19  6:24 AM   Result Value Ref Range    aPTT 57.5 (H) 21.0 - 32.0 sec   Protime-INR    Collection Time: 03/31/19  6:24 AM   Result Value Ref Range    Prothrombin Time 12.1 9.0 - 12.5 sec    INR 1.2 0.8 - 1.2       Diagnostic Results     Brain Imaging   CTH non-contrast (3/28/2019):   Evolving subacute to chronic appearing infarcts in L thalamus and L PCA territory  small remote lacune in R putamen.    MRI Brain (3/26/19):  New acute left PCA territory distribution infarctions involving L thalamus and L paramedian occipital lobe, as well as Lsplenium of the corpus  callosum   Generalized cerebral volume loss   significant chronic microvascular ischemic disease.      MRI Brain (3/24/19):  Vascular findings similar to CTH and CTA    Vessel Imaging   CTA-MP (3/24/19):  Extensive intracranial atherosclerotic disease:  Left PCA with a high-grade stenosis. High-grade (>70%) stenosis right proximal ICA and moderate (50-70%) stenosis left proximal ICA. Moderate to high-grade stenosis of the intra cavernous/ supraclinoid ICAs bilaterally. High-grade stenosis distal left vertebral artery.    Cardiac Imaging   TTE (3/23/19):  EF 55%, LVH  severe STEPHANIE  PA pressure  41   PFO with left to right shunting

## 2019-03-31 NOTE — NURSING TRANSFER
Nursing Transfer Note      3/30/2019     Transfer to: room 707 from [facility] 9090    Transfer via: bed    Transfer with: Tele    Transported by: RN and PCT    Medicines sent: Yes    Chart send with patient: Yes    Notified: ANTONIO Núñez    Patient reassessed at: 1815    Upon arrival to floor: Pt transferred to floor by RN, assessment at bedside with Mckenna ALVAREZ. Vitals stable. Handoff complete.

## 2019-03-31 NOTE — ASSESSMENT & PLAN NOTE
Permissive HTN, target sBP<180  continue Torsemide 20 mg q12, losartan 100 mg qd, and Carvedilol 25 mg q12

## 2019-03-31 NOTE — ASSESSMENT & PLAN NOTE
Within the last 3 days from 8.2 >7.9 >7.1    - Discontinued heparin gtt  - will transfuse with pRBC x1 (consent obtained from daughter Tonya Hunt via phone)  - will repeat H/H in the aftrenoon

## 2019-03-31 NOTE — ASSESSMENT & PLAN NOTE
Noted CTA and carotid US  Patient evaluated by vascular surgery, recommended maximal medical management

## 2019-03-31 NOTE — SUBJECTIVE & OBJECTIVE
Neurologic Chief Complaint: RSW + difficulty with speech    Subjective:     Interval History: Patient is seen for follow-up neurological assessment and treatment recommendations: neuro exam unchanged. NCC monitoring R groin hematoma, Hgb and Plt counts closely. Plans for step down to the floor.    HPI, Past Medical, Family, and Social History remains the same as documented in the initial encounter.     Review of Systems   Unable to perform ROS: Mental status change   Constitutional: Negative for diaphoresis and fever.   HENT: Negative for drooling, trouble swallowing and voice change.    Eyes: Positive for visual disturbance.   Respiratory: Negative for choking and shortness of breath.    Cardiovascular: Negative for palpitations.   Gastrointestinal: Negative for abdominal pain, nausea and vomiting.   Endocrine: Negative.    Skin: Negative.    Allergic/Immunologic: Negative.  Negative for immunocompromised state.   Neurological: Positive for facial asymmetry, speech difficulty and weakness. Negative for seizures and light-headedness.   Psychiatric/Behavioral: Positive for confusion. Negative for agitation.     Scheduled Meds:   sodium chloride 0.9%   Intravenous Once    allopurinol  100 mg Oral Daily    atorvastatin  80 mg Oral Daily    carvedilol  25 mg Oral BID WM    clindamycin  300 mg Oral Q6H    finasteride  5 mg Oral Daily    gabapentin  300 mg Oral QHS    insulin detemir U-100  8 Units Subcutaneous Daily    losartan  100 mg Oral QHS    pantoprazole  40 mg Oral Daily    polyethylene glycol  17 g Oral Daily    senna-docusate 8.6-50 mg  1 tablet Oral BID    tamsulosin  1 capsule Oral Daily    torsemide  20 mg Oral BID    warfarin  5 mg Oral Daily     Continuous Infusions:   heparin (porcine) in D5W 14 Units/kg/hr (03/30/19 4543)     PRN Meds:sodium chloride 0.9%, acetaminophen, albuterol-ipratropium, dextrose 50%, dextrose 50%, glucagon (human recombinant), glucose, glucose, heparin (PORCINE),  heparin (PORCINE), insulin aspart U-100, nitroGLYCERIN, ondansetron    Objective:     Vital Signs (Most Recent):  Temp: 98 °F (36.7 °C) (03/30/19 1502)  Pulse: 85 (03/30/19 1929)  Resp: 18 (03/30/19 1929)  BP: (!) 102/54 (03/30/19 1929)  SpO2: (!) 93 % (03/30/19 1929)  BP Location: Right leg    Vital Signs Range (Last 24H):  Temp:  [98 °F (36.7 °C)-98.4 °F (36.9 °C)]   Pulse:  [68-85]   Resp:  [11-26]   BP: ()/(44-84)   SpO2:  [93 %-100 %]   BP Location: Right leg    Physical Exam   Constitutional: He appears well-developed and well-nourished. He is cooperative. He appears ill. No distress.   HENT:   Head: Normocephalic and atraumatic.   Eyes: Pupils are equal, round, and reactive to light.   Neck: Normal range of motion.   Cardiovascular: Normal rate. An irregularly irregular rhythm present.   Pulmonary/Chest: Effort normal. No accessory muscle usage. No respiratory distress.   Abdominal: Soft.   Genitourinary:   Genitourinary Comments: Hematoma in the R groin, extending into scrotum   Musculoskeletal: He exhibits no edema.   Swelling has decreased, however TTP on R midline site   Neurological: He is alert. He displays no tremor. He displays no seizure activity.   Skin: Skin is warm. He is not diaphoretic.   Psychiatric: His behavior is normal.   Nursing note and vitals reviewed.    Neurological Exam:   LOC: awake, alert, cooperating  Language and articulation: notable aphasia, dysarthria    Visual Fields: R superior quadrantanopsia   Pupils (CN II, III): PERRL  Motor: antigravity in RUE, limited due to pain.     Laboratory:  Recent Results (from the past 24 hour(s))   CBC auto differential    Collection Time: 03/29/19  8:15 PM   Result Value Ref Range    WBC 6.39 3.90 - 12.70 K/uL    RBC 2.41 (L) 4.60 - 6.20 M/uL    Hemoglobin 7.9 (L) 14.0 - 18.0 g/dL    Hematocrit 23.4 (L) 40.0 - 54.0 %    MCV 97 82 - 98 fL    MCH 32.8 (H) 27.0 - 31.0 pg    MCHC 33.8 32.0 - 36.0 g/dL    RDW 15.2 (H) 11.5 - 14.5 %     Platelets 85 (L) 150 - 350 K/uL    MPV 11.0 9.2 - 12.9 fL    Immature Granulocytes 0.6 (H) 0.0 - 0.5 %    Gran # (ANC) 4.5 1.8 - 7.7 K/uL    Immature Grans (Abs) 0.04 0.00 - 0.04 K/uL    Lymph # 1.3 1.0 - 4.8 K/uL    Mono # 0.4 0.3 - 1.0 K/uL    Eos # 0.1 0.0 - 0.5 K/uL    Baso # 0.00 0.00 - 0.20 K/uL    nRBC 0 0 /100 WBC    Gran% 71.1 38.0 - 73.0 %    Lymph% 20.5 18.0 - 48.0 %    Mono% 6.7 4.0 - 15.0 %    Eosinophil% 1.1 0.0 - 8.0 %    Basophil% 0.0 0.0 - 1.9 %    Differential Method Automated    POCT glucose    Collection Time: 03/29/19  9:35 PM   Result Value Ref Range    POCT Glucose 146 (H) 70 - 110 mg/dL   APTT    Collection Time: 03/30/19  1:12 AM   Result Value Ref Range    aPTT 57.9 (H) 21.0 - 32.0 sec   POCT glucose    Collection Time: 03/30/19  2:14 AM   Result Value Ref Range    POCT Glucose 107 70 - 110 mg/dL   APTT    Collection Time: 03/30/19  3:20 AM   Result Value Ref Range    aPTT 61.3 (H) 21.0 - 32.0 sec   CBC auto differential    Collection Time: 03/30/19  3:20 AM   Result Value Ref Range    WBC 8.85 3.90 - 12.70 K/uL    RBC 2.58 (L) 4.60 - 6.20 M/uL    Hemoglobin 8.2 (L) 14.0 - 18.0 g/dL    Hematocrit 25.1 (L) 40.0 - 54.0 %    MCV 97 82 - 98 fL    MCH 31.8 (H) 27.0 - 31.0 pg    MCHC 32.7 32.0 - 36.0 g/dL    RDW 15.1 (H) 11.5 - 14.5 %    Platelets 91 (L) 150 - 350 K/uL    MPV 10.4 9.2 - 12.9 fL    Immature Granulocytes 1.0 (H) 0.0 - 0.5 %    Gran # (ANC) 6.6 1.8 - 7.7 K/uL    Immature Grans (Abs) 0.09 (H) 0.00 - 0.04 K/uL    Lymph # 1.6 1.0 - 4.8 K/uL    Mono # 0.5 0.3 - 1.0 K/uL    Eos # 0.1 0.0 - 0.5 K/uL    Baso # 0.01 0.00 - 0.20 K/uL    nRBC 0 0 /100 WBC    Gran% 74.3 (H) 38.0 - 73.0 %    Lymph% 17.9 (L) 18.0 - 48.0 %    Mono% 5.9 4.0 - 15.0 %    Eosinophil% 0.8 0.0 - 8.0 %    Basophil% 0.1 0.0 - 1.9 %    Differential Method Automated    CBC auto differential    Collection Time: 03/30/19  3:20 AM   Result Value Ref Range    WBC 8.85 3.90 - 12.70 K/uL    RBC 2.58 (L) 4.60 - 6.20 M/uL     Hemoglobin 8.2 (L) 14.0 - 18.0 g/dL    Hematocrit 25.1 (L) 40.0 - 54.0 %    MCV 97 82 - 98 fL    MCH 31.8 (H) 27.0 - 31.0 pg    MCHC 32.7 32.0 - 36.0 g/dL    RDW 15.1 (H) 11.5 - 14.5 %    Platelets 91 (L) 150 - 350 K/uL    MPV 10.4 9.2 - 12.9 fL    Immature Granulocytes 1.0 (H) 0.0 - 0.5 %    Gran # (ANC) 6.6 1.8 - 7.7 K/uL    Immature Grans (Abs) 0.09 (H) 0.00 - 0.04 K/uL    Lymph # 1.6 1.0 - 4.8 K/uL    Mono # 0.5 0.3 - 1.0 K/uL    Eos # 0.1 0.0 - 0.5 K/uL    Baso # 0.01 0.00 - 0.20 K/uL    nRBC 0 0 /100 WBC    Gran% 74.3 (H) 38.0 - 73.0 %    Lymph% 17.9 (L) 18.0 - 48.0 %    Mono% 5.9 4.0 - 15.0 %    Eosinophil% 0.8 0.0 - 8.0 %    Basophil% 0.1 0.0 - 1.9 %    Differential Method Automated    Magnesium    Collection Time: 03/30/19  3:20 AM   Result Value Ref Range    Magnesium 2.1 1.6 - 2.6 mg/dL   Comprehensive metabolic panel    Collection Time: 03/30/19  3:20 AM   Result Value Ref Range    Sodium 141 136 - 145 mmol/L    Potassium 4.4 3.5 - 5.1 mmol/L    Chloride 110 95 - 110 mmol/L    CO2 21 (L) 23 - 29 mmol/L    Glucose 95 70 - 110 mg/dL    BUN, Bld 44 (H) 8 - 23 mg/dL    Creatinine 3.8 (H) 0.5 - 1.4 mg/dL    Calcium 8.4 (L) 8.7 - 10.5 mg/dL    Total Protein 5.1 (L) 6.0 - 8.4 g/dL    Albumin 2.7 (L) 3.5 - 5.2 g/dL    Total Bilirubin 0.7 0.1 - 1.0 mg/dL    Alkaline Phosphatase 81 55 - 135 U/L    AST 17 10 - 40 U/L    ALT 27 10 - 44 U/L    Anion Gap 10 8 - 16 mmol/L    eGFR if African American 16.4 (A) >60 mL/min/1.73 m^2    eGFR if non  14.2 (A) >60 mL/min/1.73 m^2   Phosphorus    Collection Time: 03/30/19  3:20 AM   Result Value Ref Range    Phosphorus 3.2 2.7 - 4.5 mg/dL   APTT    Collection Time: 03/30/19  6:00 AM   Result Value Ref Range    aPTT 59.2 (H) 21.0 - 32.0 sec   Protime-INR    Collection Time: 03/30/19  6:00 AM   Result Value Ref Range    Prothrombin Time 11.7 9.0 - 12.5 sec    INR 1.1 0.8 - 1.2   POCT glucose    Collection Time: 03/30/19  6:20 AM   Result Value Ref Range    POCT  Glucose 114 (H) 70 - 110 mg/dL   POCT glucose    Collection Time: 03/30/19  8:40 AM   Result Value Ref Range    POCT Glucose 144 (H) 70 - 110 mg/dL   CBC auto differential    Collection Time: 03/30/19  8:41 AM   Result Value Ref Range    WBC 7.10 3.90 - 12.70 K/uL    RBC 2.47 (L) 4.60 - 6.20 M/uL    Hemoglobin 7.9 (L) 14.0 - 18.0 g/dL    Hematocrit 23.6 (L) 40.0 - 54.0 %    MCV 96 82 - 98 fL    MCH 32.0 (H) 27.0 - 31.0 pg    MCHC 33.5 32.0 - 36.0 g/dL    RDW 15.2 (H) 11.5 - 14.5 %    Platelets 90 (L) 150 - 350 K/uL    MPV 11.4 9.2 - 12.9 fL    Immature Granulocytes 1.1 (H) 0.0 - 0.5 %    Gran # (ANC) 5.3 1.8 - 7.7 K/uL    Immature Grans (Abs) 0.08 (H) 0.00 - 0.04 K/uL    Lymph # 1.3 1.0 - 4.8 K/uL    Mono # 0.4 0.3 - 1.0 K/uL    Eos # 0.1 0.0 - 0.5 K/uL    Baso # 0.00 0.00 - 0.20 K/uL    nRBC 0 0 /100 WBC    Gran% 74.6 (H) 38.0 - 73.0 %    Lymph% 17.7 (L) 18.0 - 48.0 %    Mono% 5.6 4.0 - 15.0 %    Eosinophil% 1.0 0.0 - 8.0 %    Basophil% 0.0 0.0 - 1.9 %    Differential Method Automated    APTT    Collection Time: 03/30/19 11:55 AM   Result Value Ref Range    aPTT 69.8 (H) 21.0 - 32.0 sec   POCT glucose    Collection Time: 03/30/19  2:00 PM   Result Value Ref Range    POCT Glucose 155 (H) 70 - 110 mg/dL   APTT    Collection Time: 03/30/19  5:22 PM   Result Value Ref Range    aPTT 53.0 (H) 21.0 - 32.0 sec         Diagnostic Results     Brain Imaging   CTH non-contrast (3/28/2019):   Evolving subacute to chronic appearing infarcts in L thalamus and L PCA territory  small remote lacune in R putamen.    MRI Brain (3/26/19):  New acute left PCA territory distribution infarctions involving L thalamus and L paramedian occipital lobe, as well as Lsplenium of the corpus callosum   Generalized cerebral volume loss   significant chronic microvascular ischemic disease.      MRI Brain (3/24/19):  Vascular findings similar to CTH and CTA    Vessel Imaging   CTA-MP (3/24/19):  Extensive intracranial atherosclerotic disease:  Left  PCA with a high-grade stenosis. High-grade (>70%) stenosis right proximal ICA and moderate (50-70%) stenosis left proximal ICA. Moderate to high-grade stenosis of the intra cavernous/ supraclinoid ICAs bilaterally. High-grade stenosis distal left vertebral artery.    Cardiac Imaging   TTE (3/23/19):  EF 55%, LVH  severe STEPHANIE  PA pressure  41   PFO with left to right shunting

## 2019-03-31 NOTE — PROGRESS NOTES
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 Noted to have significant intracranial atherosclerotic disease, likely the etiology, however cardiac emboli is another DDx. Patient with fluctuating RUE weakness, dysarthria and aphasia, found to have new infarcts in L PCA territory on MRI of Brain 3/26.     - Anticoagulants: Discontinued Heparin gtt due to drop in H/H. INR increased to 1.2, will increased Warfarin to 7.5 mg  - Statins: Atorvastatin- 80 mg daily  - Aggressive risk factor modification: HTN, DM, HLD, A-Fib, CAD  - Rehab efforts: PT/OT to evaluate and treat ->recommending Rehab, SLP -> recommending Regular diet/nectar thick  - VTE prophylaxis: anticoagulated  - BP parameters: target sBP<180, Continue carvedilol, losartan and torsemide    Carotid artery stenosis  Noted CTA and carotid US  Patient evaluated by vascular surgery, recommended maximal medical management    Hemoglobin drop  Within the last 3 days from 8.2 >7.9 >7.1    - Discontinued heparin gtt  - will transfuse with pRBC x1 (consent obtained from daughter Tonya Hunt via phone)  - will repeat H/H in the aftrenoon    Groin hematoma  Monitoring daily, slight increase   Discontinued Heparin gtt    Forearm swelling  R forearm edema and TTP  Arterial line removed, evaluated by ortho for compartment syndrome which was negative as well as US  Stated on Clindamycin 300 q6 for possible cellulitis in the Neuro ICU (3/30), will continue x5 days    Paroxysmal atrial fibrillation  CHADsVASc 8    - Carvedilol for rate control   - Heparin gtt discontinued due to drop in Hgb (8.2 > 7.7 > 7.1)  - will increase warfarin from 5 to 7.5 mg (INR 1.1 >1.2 this morning)    ESRD (end stage renal disease) on  dialysis  Patient is being followed by nephrology, appreciate their assistance  Continue scheduled HD, MWF    Type 2 diabetes mellitus, without long-term current use of insulin  Poorly controlled, A1C 8.3  Current glucose running ~100-150  Detemir 8 units daily  MD SSI  POCt glucose q6 hr    CAD (coronary artery disease), 2V CABG 2007  Continue on GDMT and statins.   Aspirin held due to drop in Plt count   Discontinued heparin gtt due to drop in H/H    Hyperlipidemia  LDL 46  Continue atorvastatin 80 mg due to extensive intracranial atherosclerotic disease    Essential hypertension  Permissive HTN, target sBP<180  continue Torsemide 20 mg q12, losartan 100 mg qd, and Carvedilol 25 mg q12    Hospital Course:  Admitted to hospital medicine for unstable angina eval, vascular neurology consulted for right sided weakness, facial droop and dysarthria, MRI showed L PCA infarction, not candidate for TPA ( symptoms duration >4 hours)patient symptoms improved with laying flat, admitted to neuro critical care. Patient with known PCA infarct on L with undulating symptom pattern with modification to level of recumbency. Patient restarted on heparin gtt. Underwent US of LE and of carotid with evidence of <50% L ICA stenosis and 60-70% R ICA stenosis, heavily calcified on CTA, additionally L vertebral also heavily calcified. Patient tolerated HD. Neuro deficit appear to be more severe on 3/27 with decreasing right arm strength now 2/5 and with worsening dysarthria/aphasia. Patient with gross swelling of R forearm - tense, pulses intact.   03/29/2019 patient with worsening of neurological deficit, unable to move right upper extremity, patient with pain on passive movement of right fingers exhibited by facial grimace as verbal response to pain/sensation/light touch no longer reliable, aphasia/dysarthria worsening, RLE with 2/5 strength today, patient evaluated by orthopedic surgery regarding R forearm yesterday and today and feel  that this is not compartment syndrome and will continue to monitor, distal pulses remain palpable  3/30: NAEON. On heparin gtt. Plans to step down  3/31: stepped down to VN without major events. transfused with pRBC x1 due to drop in Hbg. Discontinued heparin gtt. INR increased to 1.2, increased warfarin to 7.5    STROKE DOCUMENTATION      NIH Scale:  1a. Level of Consciousness: 1-->Not alert, but arousable by minor stimulation to obey, answer, or respond  1b. LOC Questions: 1-->Answers one question correctly  1c. LOC Commands: 1-->Performs one task correctly  2. Best Gaze: 0-->Normal  3. Visual: 1-->Partial hemianopia  4. Facial Palsy: 1-->Minor paralysis (flattened nasolabial fold, asymmetry on smiling)  5a. Motor Arm, Left: 0-->No drift, limb holds 90 (or 45) degrees for full 10 secs  5b. Motor Arm, Right: 2-->Some effort against gravity, limb cannot get to or maintain (if cued) 90 (or 45) degrees, drifts down to bed, but has some effort against gravity  6a. Motor Leg, Left: 0-->No drift, leg holds 30 degree position for full 5 secs  6b. Motor Leg, Right: 0-->No drift, leg holds 30 degree position for full 5 secs  7. Limb Ataxia: 0-->Absent  8. Sensory: 0-->Normal, no sensory loss  9. Best Language: 1-->Mild-to-moderate aphasia  10. Dysarthria: 1-->Mild-to-moderate dysarthria  11. Extinction and Inattention (formerly Neglect): 0-->No abnormality  Total (NIH Stroke Scale): 9        Modified Steve    Hazel Hurst Coma Scale:    ABCD2 Score:    [id number] Score:8  HAS -BLED Score:   ICH Score:   Hunt & Sharp Classification:      Hemorrhagic change of an Ischemic Stroke: Does this patient have an ischemic stroke with hemorrhagic changes? No     Neurologic Chief Complaint: RSW + difficulty with speech    Subjective:     Interval History: Patient is seen for follow-up neurological assessment and treatment recommendations: stepped down to VN last night. Hbg gradually dropping (7.1 this morning). discontinued heparin gtt  and transfusing with pRBC x1. INR with slight increase from 1.1 to 1.2, increased warfarin to 7.5.    HPI, Past Medical, Family, and Social History remains the same as documented in the initial encounter.     Review of Systems   Unable to perform ROS: Mental status change   Constitutional: Negative for diaphoresis and fever.   HENT: Negative for drooling, trouble swallowing and voice change.    Eyes: Positive for visual disturbance.   Respiratory: Negative for choking and shortness of breath.    Cardiovascular: Negative for palpitations.   Gastrointestinal: Negative for abdominal pain, nausea and vomiting.   Endocrine: Negative.    Skin: Negative.    Allergic/Immunologic: Negative.  Negative for immunocompromised state.   Neurological: Positive for facial asymmetry, speech difficulty and weakness. Negative for seizures and light-headedness.   Psychiatric/Behavioral: Positive for confusion. Negative for agitation.     Scheduled Meds:   sodium chloride 0.9%   Intravenous Once    allopurinol  100 mg Oral Daily    atorvastatin  80 mg Oral Daily    carvedilol  25 mg Oral BID WM    clindamycin  300 mg Oral Q6H    finasteride  5 mg Oral Daily    gabapentin  300 mg Oral QHS    insulin detemir U-100  8 Units Subcutaneous Daily    losartan  100 mg Oral QHS    pantoprazole  40 mg Oral Daily    polyethylene glycol  17 g Oral Daily    senna-docusate 8.6-50 mg  1 tablet Oral BID    tamsulosin  1 capsule Oral Daily    torsemide  20 mg Oral BID    warfarin  5 mg Oral Daily     Continuous Infusions:   heparin (porcine) in D5W 14 Units/kg/hr (03/30/19 1835)     PRN Meds:sodium chloride 0.9%, acetaminophen, albuterol-ipratropium, dextrose 50%, dextrose 50%, glucagon (human recombinant), glucose, glucose, heparin (PORCINE), heparin (PORCINE), insulin aspart U-100, nitroGLYCERIN, ondansetron    Objective:     Vital Signs (Most Recent):  Temp: 98.2 °F (36.8 °C) (03/31/19 0316)  Pulse: 95 (03/31/19 0700)  Resp: 18  (03/31/19 0316)  BP: (!) 106/51 (03/31/19 0316)  SpO2: 96 % (03/31/19 0316)  BP Location: Right leg    Vital Signs Range (Last 24H):  Temp:  [97.4 °F (36.3 °C)-98.4 °F (36.9 °C)]   Pulse:  [71-95]   Resp:  [13-22]   BP: ()/(45-83)   SpO2:  [93 %-100 %]   BP Location: Right leg    Physical Exam   Constitutional: He appears well-developed and well-nourished. He is cooperative. He appears ill. No distress.   HENT:   Head: Normocephalic and atraumatic.   Eyes: Pupils are equal, round, and reactive to light.   Neck: Normal range of motion.   Cardiovascular: Normal rate. An irregularly irregular rhythm present.   Pulmonary/Chest: Effort normal. No accessory muscle usage. No respiratory distress.   Abdominal: Soft.   Genitourinary:   Genitourinary Comments: Hematoma in the R groin, extending into scrotum   Musculoskeletal: He exhibits no edema.   Swelling has decreased, however TTP on R midline site   Neurological: He is alert. He displays no tremor. He displays no seizure activity.   Skin: Skin is warm. He is not diaphoretic.   Psychiatric: His behavior is normal.   Nursing note and vitals reviewed.    Neurological Exam:   LOC: drowsy, yet arousable with tactile stimuli  Language and articulation: notable aphasia, dysarthria    Visual Fields: R superior quadrantanopsia   Pupils (CN II, III): PERRL  Motor: antigravity in RUE, limited due to pain.     Laboratory:  Recent Results (from the past 24 hour(s))   POCT glucose    Collection Time: 03/30/19  8:40 AM   Result Value Ref Range    POCT Glucose 144 (H) 70 - 110 mg/dL   CBC auto differential    Collection Time: 03/30/19  8:41 AM   Result Value Ref Range    WBC 7.10 3.90 - 12.70 K/uL    RBC 2.47 (L) 4.60 - 6.20 M/uL    Hemoglobin 7.9 (L) 14.0 - 18.0 g/dL    Hematocrit 23.6 (L) 40.0 - 54.0 %    MCV 96 82 - 98 fL    MCH 32.0 (H) 27.0 - 31.0 pg    MCHC 33.5 32.0 - 36.0 g/dL    RDW 15.2 (H) 11.5 - 14.5 %    Platelets 90 (L) 150 - 350 K/uL    MPV 11.4 9.2 - 12.9 fL     Immature Granulocytes 1.1 (H) 0.0 - 0.5 %    Gran # (ANC) 5.3 1.8 - 7.7 K/uL    Immature Grans (Abs) 0.08 (H) 0.00 - 0.04 K/uL    Lymph # 1.3 1.0 - 4.8 K/uL    Mono # 0.4 0.3 - 1.0 K/uL    Eos # 0.1 0.0 - 0.5 K/uL    Baso # 0.00 0.00 - 0.20 K/uL    nRBC 0 0 /100 WBC    Gran% 74.6 (H) 38.0 - 73.0 %    Lymph% 17.7 (L) 18.0 - 48.0 %    Mono% 5.6 4.0 - 15.0 %    Eosinophil% 1.0 0.0 - 8.0 %    Basophil% 0.0 0.0 - 1.9 %    Differential Method Automated    APTT    Collection Time: 03/30/19 11:55 AM   Result Value Ref Range    aPTT 69.8 (H) 21.0 - 32.0 sec   POCT glucose    Collection Time: 03/30/19  2:00 PM   Result Value Ref Range    POCT Glucose 155 (H) 70 - 110 mg/dL   APTT    Collection Time: 03/30/19  5:22 PM   Result Value Ref Range    aPTT 53.0 (H) 21.0 - 32.0 sec   POCT glucose    Collection Time: 03/30/19  5:52 PM   Result Value Ref Range    POCT Glucose 147 (H) 70 - 110 mg/dL   POCT glucose    Collection Time: 03/30/19  9:47 PM   Result Value Ref Range    POCT Glucose 187 (H) 70 - 110 mg/dL   APTT    Collection Time: 03/31/19 12:34 AM   Result Value Ref Range    aPTT 59.3 (H) 21.0 - 32.0 sec   Comprehensive metabolic panel    Collection Time: 03/31/19  6:24 AM   Result Value Ref Range    Sodium 139 136 - 145 mmol/L    Potassium 4.6 3.5 - 5.1 mmol/L    Chloride 110 95 - 110 mmol/L    CO2 19 (L) 23 - 29 mmol/L    Glucose 117 (H) 70 - 110 mg/dL    BUN, Bld 46 (H) 8 - 23 mg/dL    Creatinine 5.3 (H) 0.5 - 1.4 mg/dL    Calcium 7.8 (L) 8.7 - 10.5 mg/dL    Total Protein 4.9 (L) 6.0 - 8.4 g/dL    Albumin 2.6 (L) 3.5 - 5.2 g/dL    Total Bilirubin 0.7 0.1 - 1.0 mg/dL    Alkaline Phosphatase 83 55 - 135 U/L    AST 20 10 - 40 U/L    ALT 27 10 - 44 U/L    Anion Gap 10 8 - 16 mmol/L    eGFR if African American 11.0 (A) >60 mL/min/1.73 m^2    eGFR if non African American 9.5 (A) >60 mL/min/1.73 m^2   Magnesium    Collection Time: 03/31/19  6:24 AM   Result Value Ref Range    Magnesium 2.0 1.6 - 2.6 mg/dL   Phosphorus     Collection Time: 03/31/19  6:24 AM   Result Value Ref Range    Phosphorus 4.0 2.7 - 4.5 mg/dL   Protime-INR    Collection Time: 03/31/19  6:24 AM   Result Value Ref Range    Prothrombin Time 12.1 9.0 - 12.5 sec    INR 1.2 0.8 - 1.2   CBC auto differential    Collection Time: 03/31/19  6:24 AM   Result Value Ref Range    WBC 8.37 3.90 - 12.70 K/uL    RBC 2.22 (L) 4.60 - 6.20 M/uL    Hemoglobin 7.1 (L) 14.0 - 18.0 g/dL    Hematocrit 21.7 (L) 40.0 - 54.0 %    MCV 98 82 - 98 fL    MCH 32.0 (H) 27.0 - 31.0 pg    MCHC 32.7 32.0 - 36.0 g/dL    RDW 15.3 (H) 11.5 - 14.5 %    Platelets 101 (L) 150 - 350 K/uL    MPV 10.6 9.2 - 12.9 fL    Immature Granulocytes 1.4 (H) 0.0 - 0.5 %    Gran # (ANC) 5.8 1.8 - 7.7 K/uL    Immature Grans (Abs) 0.12 (H) 0.00 - 0.04 K/uL    Lymph # 1.8 1.0 - 4.8 K/uL    Mono # 0.6 0.3 - 1.0 K/uL    Eos # 0.1 0.0 - 0.5 K/uL    Baso # 0.01 0.00 - 0.20 K/uL    nRBC 0 0 /100 WBC    Gran% 69.1 38.0 - 73.0 %    Lymph% 21.6 18.0 - 48.0 %    Mono% 6.6 4.0 - 15.0 %    Eosinophil% 1.2 0.0 - 8.0 %    Basophil% 0.1 0.0 - 1.9 %    Differential Method Automated    APTT    Collection Time: 03/31/19  6:24 AM   Result Value Ref Range    aPTT 57.5 (H) 21.0 - 32.0 sec   Protime-INR    Collection Time: 03/31/19  6:24 AM   Result Value Ref Range    Prothrombin Time 12.1 9.0 - 12.5 sec    INR 1.2 0.8 - 1.2       Diagnostic Results     Brain Imaging   CTH non-contrast (3/28/2019):   Evolving subacute to chronic appearing infarcts in L thalamus and L PCA territory  small remote lacune in R putamen.    MRI Brain (3/26/19):  New acute left PCA territory distribution infarctions involving L thalamus and L paramedian occipital lobe, as well as Lsplenium of the corpus callosum   Generalized cerebral volume loss   significant chronic microvascular ischemic disease.      MRI Brain (3/24/19):  Vascular findings similar to CTH and CTA    Vessel Imaging   CTA-MP (3/24/19):  Extensive intracranial atherosclerotic disease:  Left PCA with  a high-grade stenosis. High-grade (>70%) stenosis right proximal ICA and moderate (50-70%) stenosis left proximal ICA. Moderate to high-grade stenosis of the intra cavernous/ supraclinoid ICAs bilaterally. High-grade stenosis distal left vertebral artery.    Cardiac Imaging   TTE (3/23/19):  EF 55%, LVH  severe STEPHANIE  PA pressure  41   PFO with left to right shunting        Chaparrita Aguilera MD  Comprehensive Stroke Center  Department of Vascular Neurology   [facility]

## 2019-03-31 NOTE — ASSESSMENT & PLAN NOTE
CHADsVASc 8    - Carvedilol for rate control   - Heparin gtt discontinued due to drop in Hgb (8.2 > 7.7 > 7.1)  - will increase warfarin from 5 to 7.5 mg (INR 1.1 >1.2 this morning)

## 2019-03-31 NOTE — ASSESSMENT & PLAN NOTE
CHADsVASc 8    on coreg for rate control   Heparin gtt with warfarin transition (consider higher dose as patient remains with INR 1.1)

## 2019-03-31 NOTE — PROGRESS NOTES
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 Noted to have significant intracranial atherosclerotic disease, likely the etiology, however cardiac emboli is another DDx. Patient with fluctuating RUE weakness, dysarthria and aphasia, found to have new infarcts in L PCA territory on MRI of Brain 3/26.     - Antithrombotics for secondary stroke prevention: Anticoagulants: low intensity Heparin gtt bridge to Warfarin, INR 1.1  - Statins: Atorvastatin- 80 mg daily  - Aggressive risk factor modification: HTN, DM, HLD, A-Fib, CAD  - Rehab efforts: PT/OT/SLP to evaluate and treat ->recommending Rehab  - Diagnostics ordered/pending: suggest NPO and further evaluation with SLP   - VTE prophylaxis: Heparin gtt and warfarin   - BP parameters: Infarct: No intervention, SBP <220   - plans to step down to vascular neurology    Carotid artery stenosis  Noted CTA and carotid US  Patient evaluated by vascular surgery, plans for maximal medical management    Forearm swelling  - tight swelling of R forearm  - suggest removal of arterial line  -  Suggest switch venous access - obtain access via IJ through single or multilumen line  - US performed - unremarkable  - clindamycin x5 days for possible cellulitis    Paroxysmal atrial fibrillation  CHADsVASc 8    on coreg for rate control   Heparin gtt with warfarin transition (consider higher dose as patient remains with INR 1.1)     ESRD (end stage renal disease) on dialysis  Continue scheduled HD   Patient is being followed by nephrology, appreciate their assistance  Patient still makes urine - continue diuresis with torsemide      Type 2 diabetes mellitus, without long-term current use of insulin  Poorly controlled, A1C 8.3  Management  per NCC, current glucose running ~100-150    Long term (current) use of anticoagulants  - continue heparin gtt with asa 81mg   - transition to heparin, INR 1.1     Hyperlipidemia, baseline   LDL 46  Continue atorvastatin 80 mg due to extensive intracranial atherosclerotic disease    Essential hypertension  Permissive HTN, target sBP<180  Continue carvedilol, losartan and torsemide      Admitted to hospital medicine for unstable angina eval, vascular neurology consulted for right sided weakness, facial droop and dysarthria, MRI showed L PCA infarction, not candidate for TPA ( symptoms duration >4 hours)patient symptoms improved with laying flat, admitted to neuro critical care. Patient with known PCA infarct on L with undulating symptom pattern with modification to level of recumbency. Patient restarted on heparin gtt. Underwent US of LE and of carotid with evidence of <50% L ICA stenosis and 60-70% R ICA stenosis, heavily calcified on CTA, additionally L vertebral also heavily calcified. Patient tolerated HD. Neuro deficit appear to be more severe on 3/27 with decreasing right arm strength now 2/5 and with worsening dysarthria/aphasia. Patient with gross swelling of R forearm - tense, pulses intact.   03/29/2019 patient with worsening of neurological deficit, unable to move right upper extremity, patient with pain on passive movement of right fingers exhibited by facial grimace as verbal response to pain/sensation/light touch no longer reliable, aphasia/dysarthria worsening, RLE with 2/5 strength today, patient evaluated by orthopedic surgery regarding R forearm yesterday and today and feel that this is not compartment syndrome and will continue to monitor, distal pulses remain palpable  3/30: NAEON. On heparin gtt. Plans to step down    STROKE DOCUMENTATION        NIH Scale:  1a. Level of Consciousness: 0-->Alert, keenly responsive  1b. LOC Questions: 1-->Answers one question correctly  1c. LOC Commands:  1-->Performs one task correctly  2. Best Gaze: 0-->Normal  3. Visual: 2-->Complete hemianopia  4. Facial Palsy: 1-->Minor paralysis (flattened nasolabial fold, asymmetry on smiling)  5a. Motor Arm, Left: 0-->No drift, limb holds 90 (or 45) degrees for full 10 secs  5b. Motor Arm, Right: 2-->Some effort against gravity, limb cannot get to or maintain (if cued) 90 (or 45) degrees, drifts down to bed, but has some effort against gravity  6a. Motor Leg, Left: 0-->No drift, leg holds 30 degree position for full 5 secs  6b. Motor Leg, Right: 0-->No drift, leg holds 30 degree position for full 5 secs  7. Limb Ataxia: 0-->Absent  8. Sensory: 0-->Normal, no sensory loss  9. Best Language: 0-->No aphasia, normal  10. Dysarthria: 0-->Normal  11. Extinction and Inattention (formerly Neglect): 0-->No abnormality  Total (NIH Stroke Scale): 7       Modified Graves    [place] Coma Scale:    ABCD2 Score:    [id number] Score:8  HAS -BLED Score:   ICH Score:   Hunt & Sharp Classification:      Hemorrhagic change of an Ischemic Stroke: Does this patient have an ischemic stroke with hemorrhagic changes? No     Neurologic Chief Complaint: RSW + difficulty with speech    Subjective:     Interval History: Patient is seen for follow-up neurological assessment and treatment recommendations: neuro exam unchanged. NCC monitoring R groin hematoma, Hgb and Plt counts closely. Plans for step down to the floor.    HPI, Past Medical, Family, and Social History remains the same as documented in the initial encounter.     Review of Systems   Unable to perform ROS: Mental status change   Constitutional: Negative for diaphoresis and fever.   HENT: Negative for drooling, trouble swallowing and voice change.    Eyes: Positive for visual disturbance.   Respiratory: Negative for choking and shortness of breath.    Cardiovascular: Negative for palpitations.   Gastrointestinal: Negative for abdominal pain, nausea and vomiting.   Endocrine: Negative.     Skin: Negative.    Allergic/Immunologic: Negative.  Negative for immunocompromised state.   Neurological: Positive for facial asymmetry, speech difficulty and weakness. Negative for seizures and light-headedness.   Psychiatric/Behavioral: Positive for confusion. Negative for agitation.     Scheduled Meds:   sodium chloride 0.9%   Intravenous Once    allopurinol  100 mg Oral Daily    atorvastatin  80 mg Oral Daily    carvedilol  25 mg Oral BID WM    clindamycin  300 mg Oral Q6H    finasteride  5 mg Oral Daily    gabapentin  300 mg Oral QHS    insulin detemir U-100  8 Units Subcutaneous Daily    losartan  100 mg Oral QHS    pantoprazole  40 mg Oral Daily    polyethylene glycol  17 g Oral Daily    senna-docusate 8.6-50 mg  1 tablet Oral BID    tamsulosin  1 capsule Oral Daily    torsemide  20 mg Oral BID    warfarin  5 mg Oral Daily     Continuous Infusions:   heparin (porcine) in D5W 14 Units/kg/hr (03/30/19 1835)     PRN Meds:sodium chloride 0.9%, acetaminophen, albuterol-ipratropium, dextrose 50%, dextrose 50%, glucagon (human recombinant), glucose, glucose, heparin (PORCINE), heparin (PORCINE), insulin aspart U-100, nitroGLYCERIN, ondansetron    Objective:     Vital Signs (Most Recent):  Temp: 98 °F (36.7 °C) (03/30/19 1502)  Pulse: 85 (03/30/19 1929)  Resp: 18 (03/30/19 1929)  BP: (!) 102/54 (03/30/19 1929)  SpO2: (!) 93 % (03/30/19 1929)  BP Location: Right leg    Vital Signs Range (Last 24H):  Temp:  [98 °F (36.7 °C)-98.4 °F (36.9 °C)]   Pulse:  [68-85]   Resp:  [11-26]   BP: ()/(44-84)   SpO2:  [93 %-100 %]   BP Location: Right leg    Physical Exam   Constitutional: He appears well-developed and well-nourished. He is cooperative. He appears ill. No distress.   HENT:   Head: Normocephalic and atraumatic.   Eyes: Pupils are equal, round, and reactive to light.   Neck: Normal range of motion.   Cardiovascular: Normal rate. An irregularly irregular rhythm present.   Pulmonary/Chest: Effort  normal. No accessory muscle usage. No respiratory distress.   Abdominal: Soft.   Genitourinary:   Genitourinary Comments: Hematoma in the R groin, extending into scrotum   Musculoskeletal: He exhibits no edema.   Swelling has decreased, however TTP on R midline site   Neurological: He is alert. He displays no tremor. He displays no seizure activity.   Skin: Skin is warm. He is not diaphoretic.   Psychiatric: His behavior is normal.   Nursing note and vitals reviewed.    Neurological Exam:   LOC: awake, alert, cooperating  Language and articulation: notable aphasia, dysarthria    Visual Fields: R superior quadrantanopsia   Pupils (CN II, III): PERRL  Motor: antigravity in RUE, limited due to pain.     Laboratory:  Recent Results (from the past 24 hour(s))   CBC auto differential    Collection Time: 03/29/19  8:15 PM   Result Value Ref Range    WBC 6.39 3.90 - 12.70 K/uL    RBC 2.41 (L) 4.60 - 6.20 M/uL    Hemoglobin 7.9 (L) 14.0 - 18.0 g/dL    Hematocrit 23.4 (L) 40.0 - 54.0 %    MCV 97 82 - 98 fL    MCH 32.8 (H) 27.0 - 31.0 pg    MCHC 33.8 32.0 - 36.0 g/dL    RDW 15.2 (H) 11.5 - 14.5 %    Platelets 85 (L) 150 - 350 K/uL    MPV 11.0 9.2 - 12.9 fL    Immature Granulocytes 0.6 (H) 0.0 - 0.5 %    Gran # (ANC) 4.5 1.8 - 7.7 K/uL    Immature Grans (Abs) 0.04 0.00 - 0.04 K/uL    Lymph # 1.3 1.0 - 4.8 K/uL    Mono # 0.4 0.3 - 1.0 K/uL    Eos # 0.1 0.0 - 0.5 K/uL    Baso # 0.00 0.00 - 0.20 K/uL    nRBC 0 0 /100 WBC    Gran% 71.1 38.0 - 73.0 %    Lymph% 20.5 18.0 - 48.0 %    Mono% 6.7 4.0 - 15.0 %    Eosinophil% 1.1 0.0 - 8.0 %    Basophil% 0.0 0.0 - 1.9 %    Differential Method Automated    POCT glucose    Collection Time: 03/29/19  9:35 PM   Result Value Ref Range    POCT Glucose 146 (H) 70 - 110 mg/dL   APTT    Collection Time: 03/30/19  1:12 AM   Result Value Ref Range    aPTT 57.9 (H) 21.0 - 32.0 sec   POCT glucose    Collection Time: 03/30/19  2:14 AM   Result Value Ref Range    POCT Glucose 107 70 - 110 mg/dL   APTT     Collection Time: 03/30/19  3:20 AM   Result Value Ref Range    aPTT 61.3 (H) 21.0 - 32.0 sec   CBC auto differential    Collection Time: 03/30/19  3:20 AM   Result Value Ref Range    WBC 8.85 3.90 - 12.70 K/uL    RBC 2.58 (L) 4.60 - 6.20 M/uL    Hemoglobin 8.2 (L) 14.0 - 18.0 g/dL    Hematocrit 25.1 (L) 40.0 - 54.0 %    MCV 97 82 - 98 fL    MCH 31.8 (H) 27.0 - 31.0 pg    MCHC 32.7 32.0 - 36.0 g/dL    RDW 15.1 (H) 11.5 - 14.5 %    Platelets 91 (L) 150 - 350 K/uL    MPV 10.4 9.2 - 12.9 fL    Immature Granulocytes 1.0 (H) 0.0 - 0.5 %    Gran # (ANC) 6.6 1.8 - 7.7 K/uL    Immature Grans (Abs) 0.09 (H) 0.00 - 0.04 K/uL    Lymph # 1.6 1.0 - 4.8 K/uL    Mono # 0.5 0.3 - 1.0 K/uL    Eos # 0.1 0.0 - 0.5 K/uL    Baso # 0.01 0.00 - 0.20 K/uL    nRBC 0 0 /100 WBC    Gran% 74.3 (H) 38.0 - 73.0 %    Lymph% 17.9 (L) 18.0 - 48.0 %    Mono% 5.9 4.0 - 15.0 %    Eosinophil% 0.8 0.0 - 8.0 %    Basophil% 0.1 0.0 - 1.9 %    Differential Method Automated    CBC auto differential    Collection Time: 03/30/19  3:20 AM   Result Value Ref Range    WBC 8.85 3.90 - 12.70 K/uL    RBC 2.58 (L) 4.60 - 6.20 M/uL    Hemoglobin 8.2 (L) 14.0 - 18.0 g/dL    Hematocrit 25.1 (L) 40.0 - 54.0 %    MCV 97 82 - 98 fL    MCH 31.8 (H) 27.0 - 31.0 pg    MCHC 32.7 32.0 - 36.0 g/dL    RDW 15.1 (H) 11.5 - 14.5 %    Platelets 91 (L) 150 - 350 K/uL    MPV 10.4 9.2 - 12.9 fL    Immature Granulocytes 1.0 (H) 0.0 - 0.5 %    Gran # (ANC) 6.6 1.8 - 7.7 K/uL    Immature Grans (Abs) 0.09 (H) 0.00 - 0.04 K/uL    Lymph # 1.6 1.0 - 4.8 K/uL    Mono # 0.5 0.3 - 1.0 K/uL    Eos # 0.1 0.0 - 0.5 K/uL    Baso # 0.01 0.00 - 0.20 K/uL    nRBC 0 0 /100 WBC    Gran% 74.3 (H) 38.0 - 73.0 %    Lymph% 17.9 (L) 18.0 - 48.0 %    Mono% 5.9 4.0 - 15.0 %    Eosinophil% 0.8 0.0 - 8.0 %    Basophil% 0.1 0.0 - 1.9 %    Differential Method Automated    Magnesium    Collection Time: 03/30/19  3:20 AM   Result Value Ref Range    Magnesium 2.1 1.6 - 2.6 mg/dL   Comprehensive metabolic panel     Collection Time: 03/30/19  3:20 AM   Result Value Ref Range    Sodium 141 136 - 145 mmol/L    Potassium 4.4 3.5 - 5.1 mmol/L    Chloride 110 95 - 110 mmol/L    CO2 21 (L) 23 - 29 mmol/L    Glucose 95 70 - 110 mg/dL    BUN, Bld 44 (H) 8 - 23 mg/dL    Creatinine 3.8 (H) 0.5 - 1.4 mg/dL    Calcium 8.4 (L) 8.7 - 10.5 mg/dL    Total Protein 5.1 (L) 6.0 - 8.4 g/dL    Albumin 2.7 (L) 3.5 - 5.2 g/dL    Total Bilirubin 0.7 0.1 - 1.0 mg/dL    Alkaline Phosphatase 81 55 - 135 U/L    AST 17 10 - 40 U/L    ALT 27 10 - 44 U/L    Anion Gap 10 8 - 16 mmol/L    eGFR if African American 16.4 (A) >60 mL/min/1.73 m^2    eGFR if non  14.2 (A) >60 mL/min/1.73 m^2   Phosphorus    Collection Time: 03/30/19  3:20 AM   Result Value Ref Range    Phosphorus 3.2 2.7 - 4.5 mg/dL   APTT    Collection Time: 03/30/19  6:00 AM   Result Value Ref Range    aPTT 59.2 (H) 21.0 - 32.0 sec   Protime-INR    Collection Time: 03/30/19  6:00 AM   Result Value Ref Range    Prothrombin Time 11.7 9.0 - 12.5 sec    INR 1.1 0.8 - 1.2   POCT glucose    Collection Time: 03/30/19  6:20 AM   Result Value Ref Range    POCT Glucose 114 (H) 70 - 110 mg/dL   POCT glucose    Collection Time: 03/30/19  8:40 AM   Result Value Ref Range    POCT Glucose 144 (H) 70 - 110 mg/dL   CBC auto differential    Collection Time: 03/30/19  8:41 AM   Result Value Ref Range    WBC 7.10 3.90 - 12.70 K/uL    RBC 2.47 (L) 4.60 - 6.20 M/uL    Hemoglobin 7.9 (L) 14.0 - 18.0 g/dL    Hematocrit 23.6 (L) 40.0 - 54.0 %    MCV 96 82 - 98 fL    MCH 32.0 (H) 27.0 - 31.0 pg    MCHC 33.5 32.0 - 36.0 g/dL    RDW 15.2 (H) 11.5 - 14.5 %    Platelets 90 (L) 150 - 350 K/uL    MPV 11.4 9.2 - 12.9 fL    Immature Granulocytes 1.1 (H) 0.0 - 0.5 %    Gran # (ANC) 5.3 1.8 - 7.7 K/uL    Immature Grans (Abs) 0.08 (H) 0.00 - 0.04 K/uL    Lymph # 1.3 1.0 - 4.8 K/uL    Mono # 0.4 0.3 - 1.0 K/uL    Eos # 0.1 0.0 - 0.5 K/uL    Baso # 0.00 0.00 - 0.20 K/uL    nRBC 0 0 /100 WBC    Gran% 74.6 (H) 38.0 - 73.0  %    Lymph% 17.7 (L) 18.0 - 48.0 %    Mono% 5.6 4.0 - 15.0 %    Eosinophil% 1.0 0.0 - 8.0 %    Basophil% 0.0 0.0 - 1.9 %    Differential Method Automated    APTT    Collection Time: 03/30/19 11:55 AM   Result Value Ref Range    aPTT 69.8 (H) 21.0 - 32.0 sec   POCT glucose    Collection Time: 03/30/19  2:00 PM   Result Value Ref Range    POCT Glucose 155 (H) 70 - 110 mg/dL   APTT    Collection Time: 03/30/19  5:22 PM   Result Value Ref Range    aPTT 53.0 (H) 21.0 - 32.0 sec         Diagnostic Results     Brain Imaging   CTH non-contrast (3/28/2019):   Evolving subacute to chronic appearing infarcts in L thalamus and L PCA territory  small remote lacune in R putamen.    MRI Brain (3/26/19):  New acute left PCA territory distribution infarctions involving L thalamus and L paramedian occipital lobe, as well as Lsplenium of the corpus callosum   Generalized cerebral volume loss   significant chronic microvascular ischemic disease.      MRI Brain (3/24/19):  Vascular findings similar to CTH and CTA    Vessel Imaging   CTA-MP (3/24/19):  Extensive intracranial atherosclerotic disease:  Left PCA with a high-grade stenosis. High-grade (>70%) stenosis right proximal ICA and moderate (50-70%) stenosis left proximal ICA. Moderate to high-grade stenosis of the intra cavernous/ supraclinoid ICAs bilaterally. High-grade stenosis distal left vertebral artery.    Cardiac Imaging   TTE (3/23/19):  EF 55%, LVH  severe STEPHANIE  PA pressure  41   PFO with left to right shunting        Chaparrita Aguilera MD  Comprehensive Stroke Center  Department of Vascular Neurology   [facility]

## 2019-03-31 NOTE — ASSESSMENT & PLAN NOTE
R forearm edema and TTP  Arterial line removed, evaluated by ortho for compartment syndrome which was negative as well as US  Stated on Clindamycin 300 q6 for possible cellulitis in the Neuro ICU (3/30), will continue x5 days

## 2019-03-31 NOTE — ASSESSMENT & PLAN NOTE
Patient is being followed by nephrology, appreciate their assistance  Continue scheduled HD, MWF

## 2019-03-31 NOTE — ASSESSMENT & PLAN NOTE
Noted CTA and carotid US  Patient evaluated by vascular surgery, plans for maximal medical management

## 2019-03-31 NOTE — ASSESSMENT & PLAN NOTE
Continue scheduled HD   Patient is being followed by nephrology, appreciate their assistance  Patient still makes urine - continue diuresis with torsemide

## 2019-03-31 NOTE — ASSESSMENT & PLAN NOTE
Continue on GDMT and statins.   Aspirin held due to drop in Plt count   Discontinued heparin gtt due to drop in H/H

## 2019-03-31 NOTE — ASSESSMENT & PLAN NOTE
79 y/o M with multiple co morbidities (A.fib, HTN, CHF, ESRD, DM), on day #9 of hospitalization in [facility] with L PCA syndrome. Noted to have significant intracranial atherosclerotic disease, likely the etiology, however cardiac emboli is another DDx. Patient with fluctuating RUE weakness, dysarthria and aphasia, found to have new infarcts in L PCA territory on MRI of Brain 3/26.     - Anticoagulants: Discontinued Heparin gtt due to drop in H/H. INR increased to 1.2, will increased Warfarin to 7.5 mg  - Statins: Atorvastatin- 80 mg daily  - Aggressive risk factor modification: HTN, DM, HLD, A-Fib, CAD  - Rehab efforts: PT/OT to evaluate and treat ->recommending Rehab, SLP -> recommending Regular diet/nectar thick  - VTE prophylaxis: anticoagulated  - BP parameters: target sBP<180, Continue carvedilol, losartan and torsemide

## 2019-03-31 NOTE — PLAN OF CARE
Problem: Adult Inpatient Plan of Care  Goal: Plan of Care Review  Past Medical History:  7/29/2016: NICK (acute kidney injury)  No date: Allergy  12/15/2014: Anemia, mild  No date: Arthritis      Comment:  Gout  3/27/2012: Benign essential HTN  5/10/2018: BMI 29.0-29.9,adult  No date: BPH (benign prostatic hyperplasia)  No date: BPH (benign prostatic hyperplasia)  2006: CAD (coronary artery disease)  No date: Chronic kidney disease      Comment:  due to ibuprofen  No date: Colon polyp  No date: CRF (chronic renal failure), stage 5  No date: Diverticulosis  No date: Gastritis  No date: GERD (gastroesophageal reflux disease)  No date: Gout  5/3/2018: History of colon polyps  3/27/2012: HTN (hypertension)  No date: Hyperlipidemia  No date: Hyperlipidemia  6/14/2018: LLL pneumonia  No date: LVH (left ventricular hypertrophy)  No date: Mesenteric ischemia  3/27/2012: Murmur, cardiac  No date: GRICELDA (obstructive sleep apnea)      Comment:  DOES NOT USE A MACHINE  No date: Sinus problem  No date: Syncope and collapse      Past Surgical History:  No date: APPENDECTOMY  5/31/2016: BLOCK-NERVE; Left      Comment:  Performed by Kevin Leon MD at [facility] OR  No date: CARDIAC CATHETERIZATION  2011: COLONOSCOPY  5/3/2018: COLONOSCOPY; N/A      Comment:  Performed by Messi Harris MD at [facility] ENDO  9/10/2015: COLONOSCOPY; N/A      Comment:  Performed by Messi Harris MD at [facility] ENDO  4/2007: CORONARY ARTERY BYPASS GRAFT      Comment:  x 1  8/30/2017: CYSTOSCOPY; N/A      Comment:  Performed by Rudy Herring MD at [facility] OR  11/10/2015: CYSTOSCOPY; N/A      Comment:  Performed by Rudy Herring MD at [facility] OR  1/4/2016: ESOPHAGOGASTRODUODENOSCOPY (EGD); N/A      Comment:  Performed by Tra Brooks MD at [facility] ENDO (2ND FLR)  10/15/2014: ESOPHAGOGASTRODUODENOSCOPY (EGD); N/A      Comment:  Performed by Messi Harris MD at [facility] ENDO  2/25/2016: INJECTION-STEROID-EPIDURAL-TRANSFORAMINAL; Left      Comment:  Performed by  Kevin Leon MD at [facility] OR  No date: JOINT REPLACEMENT      Comment:  left knee total replacement  X 3  No date: mid leftt finger      Comment:  from clara cardoza  2016: MOLE REMOVAL  4/11/2016: RADIOFREQUENCY THERMOCOAGULATION (RFTC)-NERVE-MEDIAN   BRANCH-LUMBAR; Left      Comment:  Performed by Kevin Leon MD at [facility] OR  No date: rotative cuff      Comment:  no rotative cuffs on bilat shoulders has pins   No date: SHOULDER SURGERY      Comment:  shoulder surgery bilat  RIGHT X 4; LEFT X 3  11/10/2015: TRANSRECTAL ULTRASOUND GUIDED PROSTATE BIOPSY; Bilateral      Comment:  Performed by Rudy Herring MD at [facility] OR  5/31/2017: ULTRASOUND-ENDOSCOPIC-UPPER; N/A      Comment:  Performed by Tra Brooks MD at [facility] ENDO (2ND FLR)  1/4/2016: ULTRASOUND-ENDOSCOPIC-UPPER; N/A      Comment:  Performed by Tra Brooks MD at [facility] ENDO (2ND FLR)  1/16/2015: ULTRASOUND-ENDOSCOPIC-UPPER; N/A      Comment:  Performed by Jason Saleem MD at [facility] ENDO (2ND FLR)    Patient likes blankets on.    Pt global aphasic. Unable to follow commands when prompted. Alert to name call and noxious stimuli. Rt arm with edema elevated on pillow. Turned and repositioned frequently. Heparin infusion continued tolerating well.  No apparent distress noted. Bed in lowest position and locked, call light within reach, side rails X2, and slip resistant socks on at this time. Will continue to monitor     Problem: Diabetes Comorbidity  Goal: Blood Glucose Level Within Desired Range  Outcome: Ongoing (interventions implemented as appropriate)  Blood glucose being monitored with signs/symptoms of glucose being monitored and treated as needed.

## 2019-04-01 ENCOUNTER — CLINICAL SUPPORT (OUTPATIENT)
Dept: CARDIOLOGY | Facility: CLINIC | Age: 79
DRG: 280 | End: 2019-04-01
Attending: HOSPITALIST
Payer: MEDICARE

## 2019-04-01 ENCOUNTER — TELEPHONE (OUTPATIENT)
Dept: HEMATOLOGY/ONCOLOGY | Facility: CLINIC | Age: 79
End: 2019-04-01

## 2019-04-01 PROBLEM — L03.113 CELLULITIS OF RIGHT ARM: Status: ACTIVE | Noted: 2019-03-28

## 2019-04-01 PROBLEM — G93.6 CYTOTOXIC CEREBRAL EDEMA: Status: ACTIVE | Noted: 2019-04-01

## 2019-04-01 LAB
ALBUMIN SERPL BCP-MCNC: 2.6 G/DL (ref 3.5–5.2)
ALP SERPL-CCNC: 76 U/L (ref 55–135)
ALT SERPL W/O P-5'-P-CCNC: 24 U/L (ref 10–44)
ANION GAP SERPL CALC-SCNC: 9 MMOL/L (ref 8–16)
APTT BLDCRRT: 25.7 SEC (ref 21–32)
AST SERPL-CCNC: 19 U/L (ref 10–40)
BASOPHILS # BLD AUTO: 0.01 K/UL (ref 0–0.2)
BASOPHILS # BLD AUTO: 0.01 K/UL (ref 0–0.2)
BASOPHILS NFR BLD: 0.1 % (ref 0–1.9)
BASOPHILS NFR BLD: 0.1 % (ref 0–1.9)
BILIRUB SERPL-MCNC: 0.8 MG/DL (ref 0.1–1)
BUN SERPL-MCNC: 51 MG/DL (ref 8–23)
CALCIUM SERPL-MCNC: 7.7 MG/DL (ref 8.7–10.5)
CHLORIDE SERPL-SCNC: 112 MMOL/L (ref 95–110)
CO2 SERPL-SCNC: 20 MMOL/L (ref 23–29)
CREAT SERPL-MCNC: 5.9 MG/DL (ref 0.5–1.4)
DIFFERENTIAL METHOD: ABNORMAL
DIFFERENTIAL METHOD: ABNORMAL
EOSINOPHIL # BLD AUTO: 0.1 K/UL (ref 0–0.5)
EOSINOPHIL # BLD AUTO: 0.1 K/UL (ref 0–0.5)
EOSINOPHIL NFR BLD: 1.1 % (ref 0–8)
EOSINOPHIL NFR BLD: 1.3 % (ref 0–8)
ERYTHROCYTE [DISTWIDTH] IN BLOOD BY AUTOMATED COUNT: 15.9 % (ref 11.5–14.5)
ERYTHROCYTE [DISTWIDTH] IN BLOOD BY AUTOMATED COUNT: 16.3 % (ref 11.5–14.5)
EST. GFR  (AFRICAN AMERICAN): 9.6 ML/MIN/1.73 M^2
EST. GFR  (NON AFRICAN AMERICAN): 8.3 ML/MIN/1.73 M^2
GLUCOSE SERPL-MCNC: 93 MG/DL (ref 70–110)
HCT VFR BLD AUTO: 22.9 % (ref 40–54)
HCT VFR BLD AUTO: 23.7 % (ref 40–54)
HGB BLD-MCNC: 7.6 G/DL (ref 14–18)
HGB BLD-MCNC: 7.6 G/DL (ref 14–18)
IMM GRANULOCYTES # BLD AUTO: 0.08 K/UL (ref 0–0.04)
IMM GRANULOCYTES # BLD AUTO: 0.12 K/UL (ref 0–0.04)
IMM GRANULOCYTES NFR BLD AUTO: 1.1 % (ref 0–0.5)
IMM GRANULOCYTES NFR BLD AUTO: 1.7 % (ref 0–0.5)
INR PPP: 1.4 (ref 0.8–1.2)
INR PPP: 1.4 (ref 0.8–1.2)
LYMPHOCYTES # BLD AUTO: 1.7 K/UL (ref 1–4.8)
LYMPHOCYTES # BLD AUTO: 1.7 K/UL (ref 1–4.8)
LYMPHOCYTES NFR BLD: 24.1 % (ref 18–48)
LYMPHOCYTES NFR BLD: 24.8 % (ref 18–48)
MAGNESIUM SERPL-MCNC: 2.1 MG/DL (ref 1.6–2.6)
MCH RBC QN AUTO: 31 PG (ref 27–31)
MCH RBC QN AUTO: 32.1 PG (ref 27–31)
MCHC RBC AUTO-ENTMCNC: 32.1 G/DL (ref 32–36)
MCHC RBC AUTO-ENTMCNC: 33.2 G/DL (ref 32–36)
MCV RBC AUTO: 97 FL (ref 82–98)
MCV RBC AUTO: 97 FL (ref 82–98)
MONOCYTES # BLD AUTO: 0.5 K/UL (ref 0.3–1)
MONOCYTES # BLD AUTO: 0.5 K/UL (ref 0.3–1)
MONOCYTES NFR BLD: 6.9 % (ref 4–15)
MONOCYTES NFR BLD: 7.4 % (ref 4–15)
NEUTROPHILS # BLD AUTO: 4.6 K/UL (ref 1.8–7.7)
NEUTROPHILS # BLD AUTO: 4.7 K/UL (ref 1.8–7.7)
NEUTROPHILS NFR BLD: 65.3 % (ref 38–73)
NEUTROPHILS NFR BLD: 66.1 % (ref 38–73)
NRBC BLD-RTO: 0 /100 WBC
NRBC BLD-RTO: 0 /100 WBC
OHS CV RIGHT ABI LOWER EXTREMITY (NO CALC): 1.57
PHOSPHATE SERPL-MCNC: 4.4 MG/DL (ref 2.7–4.5)
PLATELET # BLD AUTO: 111 K/UL (ref 150–350)
PLATELET # BLD AUTO: 92 K/UL (ref 150–350)
PMV BLD AUTO: 10.7 FL (ref 9.2–12.9)
PMV BLD AUTO: 9.9 FL (ref 9.2–12.9)
POCT GLUCOSE: 224 MG/DL (ref 70–110)
POCT GLUCOSE: 92 MG/DL (ref 70–110)
POTASSIUM SERPL-SCNC: 4.9 MMOL/L (ref 3.5–5.1)
PROT SERPL-MCNC: 4.9 G/DL (ref 6–8.4)
PROTHROMBIN TIME: 14.1 SEC (ref 9–12.5)
PROTHROMBIN TIME: 14.1 SEC (ref 9–12.5)
RBC # BLD AUTO: 2.37 M/UL (ref 4.6–6.2)
RBC # BLD AUTO: 2.45 M/UL (ref 4.6–6.2)
RIGHT ANT TIBIAL SYS PSV: 96 CM/S
RIGHT CFA PSV: 152 CM/S
RIGHT EXTERNAL ILLIAC PSV: 64 CM/S
RIGHT PERONEAL SYS PSV: 39 CM/S
RIGHT POPLITEAL PSV: 40 CM/S
RIGHT POST TIBIAL SYS PSV: 41 CM/S
RIGHT PROFUNDA SYS PSV: 177 CM/S
RIGHT SUPER FEMORAL DIST SYS PSV: 48 CM/S
RIGHT SUPER FEMORAL MID SYS PSV: 154 CM/S
RIGHT SUPER FEMORAL OSTIAL SYS PSV: 121 CM/S
RIGHT SUPER FEMORAL PROX SYS PSV: 116 CM/S
RIGHT TIB/PER TRUNK SYS PSV: 47 CM/S
SODIUM SERPL-SCNC: 141 MMOL/L (ref 136–145)
WBC # BLD AUTO: 7.01 K/UL (ref 3.9–12.7)
WBC # BLD AUTO: 7.14 K/UL (ref 3.9–12.7)

## 2019-04-01 PROCEDURE — 85730 THROMBOPLASTIN TIME PARTIAL: CPT

## 2019-04-01 PROCEDURE — 80100016 HC MAINTENANCE HEMODIALYSIS

## 2019-04-01 PROCEDURE — 25000003 PHARM REV CODE 250: Performed by: STUDENT IN AN ORGANIZED HEALTH CARE EDUCATION/TRAINING PROGRAM

## 2019-04-01 PROCEDURE — 85025 COMPLETE CBC W/AUTO DIFF WBC: CPT | Mod: 91

## 2019-04-01 PROCEDURE — 63600175 PHARM REV CODE 636 W HCPCS: Mod: JG | Performed by: NURSE PRACTITIONER

## 2019-04-01 PROCEDURE — 90935 PR HEMODIALYSIS, ONE EVALUATION: ICD-10-PCS | Mod: ,,, | Performed by: NURSE PRACTITIONER

## 2019-04-01 PROCEDURE — 85610 PROTHROMBIN TIME: CPT

## 2019-04-01 PROCEDURE — 84100 ASSAY OF PHOSPHORUS: CPT

## 2019-04-01 PROCEDURE — 99233 PR SUBSEQUENT HOSPITAL CARE,LEVL III: ICD-10-PCS | Mod: GC,,, | Performed by: PSYCHIATRY & NEUROLOGY

## 2019-04-01 PROCEDURE — 93926 CV US DOPPLER ARTERIAL LEG RIGHT (CUPID ONLY): ICD-10-PCS | Mod: 26,RT,, | Performed by: INTERNAL MEDICINE

## 2019-04-01 PROCEDURE — 25000003 PHARM REV CODE 250: Performed by: HOSPITALIST

## 2019-04-01 PROCEDURE — 90935 HEMODIALYSIS ONE EVALUATION: CPT | Mod: ,,, | Performed by: NURSE PRACTITIONER

## 2019-04-01 PROCEDURE — 25000003 PHARM REV CODE 250: Performed by: INTERNAL MEDICINE

## 2019-04-01 PROCEDURE — 93926 LOWER EXTREMITY STUDY: CPT | Mod: 26,RT,, | Performed by: INTERNAL MEDICINE

## 2019-04-01 PROCEDURE — 83735 ASSAY OF MAGNESIUM: CPT

## 2019-04-01 PROCEDURE — 25000003 PHARM REV CODE 250: Performed by: NURSE PRACTITIONER

## 2019-04-01 PROCEDURE — 93926 LOWER EXTREMITY STUDY: CPT | Mod: RT

## 2019-04-01 PROCEDURE — 36415 COLL VENOUS BLD VENIPUNCTURE: CPT

## 2019-04-01 PROCEDURE — 20600001 HC STEP DOWN PRIVATE ROOM

## 2019-04-01 PROCEDURE — 80053 COMPREHEN METABOLIC PANEL: CPT

## 2019-04-01 PROCEDURE — 99233 SBSQ HOSP IP/OBS HIGH 50: CPT | Mod: GC,,, | Performed by: PSYCHIATRY & NEUROLOGY

## 2019-04-01 RX ORDER — SODIUM CHLORIDE 9 MG/ML
INJECTION, SOLUTION INTRAVENOUS ONCE
Status: COMPLETED | OUTPATIENT
Start: 2019-04-01 | End: 2019-04-01

## 2019-04-01 RX ADMIN — CLINDAMYCIN HYDROCHLORIDE 300 MG: 150 CAPSULE ORAL at 06:04

## 2019-04-01 RX ADMIN — CLINDAMYCIN HYDROCHLORIDE 300 MG: 150 CAPSULE ORAL at 12:04

## 2019-04-01 RX ADMIN — LOSARTAN POTASSIUM 100 MG: 50 TABLET, FILM COATED ORAL at 10:04

## 2019-04-01 RX ADMIN — TORSEMIDE 20 MG: 20 TABLET ORAL at 10:04

## 2019-04-01 RX ADMIN — SODIUM CHLORIDE: 0.9 INJECTION, SOLUTION INTRAVENOUS at 09:04

## 2019-04-01 RX ADMIN — ERYTHROPOIETIN 10000 UNITS: 10000 INJECTION, SOLUTION INTRAVENOUS; SUBCUTANEOUS at 12:04

## 2019-04-01 RX ADMIN — WARFARIN SODIUM 7.5 MG: 2.5 TABLET ORAL at 06:04

## 2019-04-01 RX ADMIN — ATORVASTATIN CALCIUM 80 MG: 20 TABLET, FILM COATED ORAL at 10:04

## 2019-04-01 RX ADMIN — CARVEDILOL 25 MG: 25 TABLET, FILM COATED ORAL at 06:04

## 2019-04-01 RX ADMIN — INSULIN ASPART 2 UNITS: 100 INJECTION, SOLUTION INTRAVENOUS; SUBCUTANEOUS at 10:04

## 2019-04-01 RX ADMIN — ACETAMINOPHEN 650 MG: 325 TABLET ORAL at 10:04

## 2019-04-01 RX ADMIN — GABAPENTIN 300 MG: 300 CAPSULE ORAL at 10:04

## 2019-04-01 NOTE — ASSESSMENT & PLAN NOTE
- continue flomax and finasteride  -has been making urine and several episodes of incontinence per sister in law but has not urinated since yesterday  -u/a  And bladder scan ordered

## 2019-04-01 NOTE — ASSESSMENT & PLAN NOTE
Poorly controlled, A1C 8.3  Current glucose running ~100-150  Detemir 8 units daily  MD SSI  POCt glucose q6 hr

## 2019-04-01 NOTE — PROGRESS NOTES
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 Fluctuating neurological examination with RUE weakness, dysarthria and aphasia.  New stroke seen on repeat imaging.  He has significant intracranial/extrancranial atherosclerotic disease seen on CTA head/neck. Etiology cardioembolic versus atheroembolic.      - Anticoagulants: Coumadin daily adjusting for INR goal of 2.0-3.0   - Statins: Atorvastatin- 80 mg daily  -Diagnostic studies pending: INR and arterial ultrasound   - Aggressive risk factor modification: HTN, DM, HLD, A-Fib, CAD, intracranial atherosclerosis   - Rehab efforts: PT/OT to evaluate and treat ->recommending Rehab, SLP -> recommending Regular diet/nectar thick  - VTE prophylaxis: anticoagulated  - BP parameters: target sBP<160    Groin hematoma  Monitoring daily,continue to worsen  H/h decreasing Discontinued Heparin gtt  Stat right femoral arterial ultrasound ordered     Dysarthria due to acute cerebrovascular accident (CVA)  - recommend patient be re-evaluated by SLP  - recommend NPO for diet     Cellulitis of right arm  R forearm edema and TTP  Arterial line removed, evaluated by ortho for compartment syndrome which was negative as well as US  Stated on Clindamycin 300 q6 for possible cellulitis in the Neuro ICU (3/30), will continue x5 days    Patent foramen ovale  Risk factor for stroke  Seen on echocardiogram     Paroxysmal atrial fibrillation  Risk factor for stroke   CHADsVASc 7    - Carvedilol for rate control   - Heparin gtt discontinued due to drop in Hgb (8.2 > 7.7 > 7.1)  - coumadin 7.5mg     ESRD (end stage renal disease) on dialysis  Patient is being followed by nephrology, appreciate their assistance  Continue scheduled HD, MWF        Type 2 diabetes mellitus,  without long-term current use of insulin  Poorly controlled, A1C 8.3  Current glucose running ~100-150  Detemir 8 units daily  MD SSI  POCt glucose q6 hr    Long term (current) use of anticoagulants  - continue heparin gtt with asa 81mg   - transition to heparin, INR 1.1     Benign prostatic hyperplasia without lower urinary tract symptoms  - continue flomax and finasteride  -has been making urine and several episodes of incontinence per sister in law but has not urinated since yesterday  -u/a  And bladder scan ordered     Anemia of chronic disease  Due to ESRD   receives EPO every 2 weeks   H/h continues to trend down.  Blood transfusion 03/31 1unit        Gout, arthritis  - continue allopurinol, patient would likely benefit to change dosing to post dialysis days only     CAD (coronary artery disease), 2V CABG 2007  Risk factor for stroke  Continue statin.   On coumadin daily         Hyperlipidemia  LDL 46  Continue atorvastatin 80 mg due to extensive intracranial atherosclerotic disease      Essential hypertension  Risk factor for stroke   SBP<160  continue Torsemide 20 mg q12, losartan 100 mg qd, and Carvedilol 25 mg q12        Carotid artery stenosis  Noted CTA and carotid US  Patient evaluated by vascular surgery, recommended maximal medical management         Admitted to hospital medicine for unstable angina eval, vascular neurology consulted for right sided weakness, facial droop and dysarthria, MRI showed L PCA infarction, not candidate for TPA ( symptoms duration >4 hours)patient symptoms improved with laying flat, admitted to neuro critical care. Patient with known PCA infarct on L with undulating symptom pattern with modification to level of recumbency. Patient restarted on heparin gtt. Underwent US of LE and of carotid with evidence of <50% L ICA stenosis and 60-70% R ICA stenosis, heavily calcified on CTA, additionally L vertebral also heavily calcified. Patient tolerated HD. Neuro deficit appear to be  more severe on 3/27 with decreasing right arm strength now 2/5 and with worsening dysarthria/aphasia. Patient with gross swelling of R forearm - tense, pulses intact.   03/29/2019 patient with worsening of neurological deficit, unable to move right upper extremity, patient with pain on passive movement of right fingers exhibited by facial grimace as verbal response to pain/sensation/light touch no longer reliable, aphasia/dysarthria worsening, RLE with 2/5 strength today, patient evaluated by orthopedic surgery regarding R forearm yesterday and today and feel that this is not compartment syndrome and will continue to monitor, distal pulses remain palpable  3/30: NAEON. On heparin gtt. Plans to step down  3/31: stepped down to VN without major events. transfused with pRBC x1 due to drop in Hbg. Discontinued heparin gtt. INR increased to 1.2, increased warfarin to 7.5  04/01/19 Visited patient in dialysis today.  On coumadin and atorvastatin for secondary stroke prevention H/H stabilized at 7.6 after 1 unit pRBC transfusion on 03/31.  Epo given during dialysis.  Patient has worsening right groin hematoma.  Stat right femoral artery u/s ordered.  On clindamycin for suspected right arm cellulitis.  Sister-in-law concern for urination.  Patient was incontinent and making urine but today has not had any urination.  U/A and bladder scan ordered     STROKE DOCUMENTATION        NIH Scale:  1a. Level of Consciousness: 0-->Alert, keenly responsive  1b. LOC Questions: 2-->Answers neither question correctly  1c. LOC Commands: 0-->Performs both tasks correctly  2. Best Gaze: 0-->Normal  3. Visual: 2-->Complete hemianopia  4. Facial Palsy: 0-->Normal symmetrical movements  5a. Motor Arm, Left: 0-->No drift, limb holds 90 (or 45) degrees for full 10 secs  5b. Motor Arm, Right: 2-->Some effort against gravity, limb cannot get to or maintain (if cued) 90 (or 45) degrees, drifts down to bed, but has some effort against gravity  6a.  Motor Leg, Left: 0-->No drift, leg holds 30 degree position for full 5 secs  6b. Motor Leg, Right: 1-->Drift, leg falls by the end of the 5-sec period but does not hit bed  7. Limb Ataxia: 0-->Absent  8. Sensory: 0-->Normal, no sensory loss  9. Best Language: 1-->Mild-to-moderate aphasia, some obvious loss of fluency or facility of comprehension, without significant limitation on ideas expressed or form of expression. Reduction of speech and/or comprehension, however, makes conversation. . . (see row details)  10. Dysarthria: 0-->Normal  11. Extinction and Inattention (formerly Neglect): 0-->No abnormality  Total (NIH Stroke Scale): 8       Modified [place] Coma Scale:    ABCD2 Score:    [id number] Score:7  HAS -BLED Score:   ICH Score:   Hunt & Sharp Classification:      Hemorrhagic change of an Ischemic Stroke: Does this patient have an ischemic stroke with hemorrhagic changes? No     Neurologic Chief Complaint: RSW + difficulty with speech    Subjective:     Interval History: Patient is seen for follow-up neurological assessment and treatment recommendations:Visited patient in dialysis today.  On coumadin and atorvastatin for secondary stroke prevention. H/H stabilized at 7.6 after 1 unit pRBC transfusion on 03/31.  Epo given during dialysis.  Patient has worsening right groin hematoma.  Stat right femoral artery u/s ordered.  On clindamycin for suspected right arm cellulitis.  Sister-in-law concern for urination.  Patient was incontinent and making urine but today has not had any urination.  U/A and bladder scan ordered     HPI, Past Medical, Family, and Social History remains the same as documented in the initial encounter.     Review of Systems   Constitutional: Negative for diaphoresis and fever.   HENT: Negative for drooling, trouble swallowing and voice change.    Eyes: Positive for visual disturbance.   Respiratory: Negative for choking and shortness of breath.    Cardiovascular: Negative for  palpitations.   Gastrointestinal: Negative for abdominal pain, nausea and vomiting.   Endocrine: Negative.    Skin: Negative.    Allergic/Immunologic: Negative.    Neurological: Positive for facial asymmetry, speech difficulty and weakness. Negative for seizures and light-headedness.   Psychiatric/Behavioral: Positive for confusion. Negative for agitation.     Scheduled Meds:   sodium chloride 0.9%   Intravenous Once    allopurinol  100 mg Oral Daily    atorvastatin  80 mg Oral Daily    carvedilol  25 mg Oral BID WM    clindamycin  300 mg Oral Q6H    epoetin kitty (PROCRIT) injection  10,000 Units Intravenous Every Mon, Wed, Fri    finasteride  5 mg Oral Daily    gabapentin  300 mg Oral QHS    insulin detemir U-100  8 Units Subcutaneous Daily    losartan  100 mg Oral QHS    pantoprazole  40 mg Oral Daily    polyethylene glycol  17 g Oral Daily    senna-docusate 8.6-50 mg  1 tablet Oral BID    tamsulosin  1 capsule Oral Daily    torsemide  20 mg Oral BID    warfarin  7.5 mg Oral Daily     Continuous Infusions:    PRN Meds:sodium chloride, sodium chloride 0.9%, acetaminophen, albuterol-ipratropium, dextrose 50%, dextrose 50%, glucagon (human recombinant), glucose, glucose, insulin aspart U-100, nitroGLYCERIN, ondansetron    Objective:     Vital Signs (Most Recent):  Temp: 98.9 °F (37.2 °C) (04/01/19 0855)  Pulse: 79 (04/01/19 1230)  Resp: 18 (04/01/19 0855)  BP: (!) 199/52 (04/01/19 1230)  SpO2: (!) 92 % (04/01/19 0316)  BP Location: Left leg    Vital Signs Range (Last 24H):  Temp:  [97.5 °F (36.4 °C)-99.6 °F (37.6 °C)]   Pulse:  [58-90]   Resp:  [16-20]   BP: ()/(52-95)   SpO2:  [92 %-100 %]   BP Location: Left leg    Physical Exam   Constitutional: He appears well-developed and well-nourished. He is cooperative. He appears ill. No distress.   HENT:   Head: Normocephalic and atraumatic.   Eyes: Pupils are equal, round, and reactive to light.   Neck: Normal range of motion.   Cardiovascular:  Normal rate. An irregularly irregular rhythm present.   Pulmonary/Chest: Effort normal. No accessory muscle usage. No respiratory distress.   Genitourinary:   Genitourinary Comments: Hematoma in the R groin, extending into scrotum and pelvic region      Musculoskeletal: He exhibits no edema.   Swelling has decreased, however TTP on R midline site   Neurological: He displays no tremor. He displays no seizure activity.   Skin: Skin is warm. He is not diaphoretic.   Psychiatric: His behavior is normal.   Nursing note and vitals reviewed.    Neurological Exam:   LOC: alert and follows commands  Language and articulation: notable aphasia, dysarthria    Visual Fields: no blink to threat right    Pupils (CN II, III): PERRL  Motor: antigravity in RUE, limited due to pain. 3/5, 3/5 RLE  Left side 5/5     Laboratory:  Recent Results (from the past 24 hour(s))   Type & Screen    Collection Time: 03/31/19  2:05 PM   Result Value Ref Range    Group & Rh B POS     Indirect Jasmin NEG    POCT glucose    Collection Time: 03/31/19  5:42 PM   Result Value Ref Range    POCT Glucose 163 (H) 70 - 110 mg/dL   POCT glucose    Collection Time: 03/31/19  9:09 PM   Result Value Ref Range    POCT Glucose 153 (H) 70 - 110 mg/dL   APTT    Collection Time: 04/01/19 12:08 AM   Result Value Ref Range    aPTT 25.7 21.0 - 32.0 sec   CBC auto differential    Collection Time: 04/01/19 12:40 AM   Result Value Ref Range    WBC 7.14 3.90 - 12.70 K/uL    RBC 2.45 (L) 4.60 - 6.20 M/uL    Hemoglobin 7.6 (L) 14.0 - 18.0 g/dL    Hematocrit 23.7 (L) 40.0 - 54.0 %    MCV 97 82 - 98 fL    MCH 31.0 27.0 - 31.0 pg    MCHC 32.1 32.0 - 36.0 g/dL    RDW 15.9 (H) 11.5 - 14.5 %    Platelets 92 (L) 150 - 350 K/uL    MPV 9.9 9.2 - 12.9 fL    Immature Granulocytes 1.7 (H) 0.0 - 0.5 %    Gran # (ANC) 4.7 1.8 - 7.7 K/uL    Immature Grans (Abs) 0.12 (H) 0.00 - 0.04 K/uL    Lymph # 1.7 1.0 - 4.8 K/uL    Mono # 0.5 0.3 - 1.0 K/uL    Eos # 0.1 0.0 - 0.5 K/uL    Baso # 0.01 0.00  - 0.20 K/uL    nRBC 0 0 /100 WBC    Gran% 66.1 38.0 - 73.0 %    Lymph% 24.1 18.0 - 48.0 %    Mono% 6.9 4.0 - 15.0 %    Eosinophil% 1.1 0.0 - 8.0 %    Basophil% 0.1 0.0 - 1.9 %    Differential Method Automated    Comprehensive metabolic panel    Collection Time: 04/01/19  5:59 AM   Result Value Ref Range    Sodium 141 136 - 145 mmol/L    Potassium 4.9 3.5 - 5.1 mmol/L    Chloride 112 (H) 95 - 110 mmol/L    CO2 20 (L) 23 - 29 mmol/L    Glucose 93 70 - 110 mg/dL    BUN, Bld 51 (H) 8 - 23 mg/dL    Creatinine 5.9 (H) 0.5 - 1.4 mg/dL    Calcium 7.7 (L) 8.7 - 10.5 mg/dL    Total Protein 4.9 (L) 6.0 - 8.4 g/dL    Albumin 2.6 (L) 3.5 - 5.2 g/dL    Total Bilirubin 0.8 0.1 - 1.0 mg/dL    Alkaline Phosphatase 76 55 - 135 U/L    AST 19 10 - 40 U/L    ALT 24 10 - 44 U/L    Anion Gap 9 8 - 16 mmol/L    eGFR if African American 9.6 (A) >60 mL/min/1.73 m^2    eGFR if non  8.3 (A) >60 mL/min/1.73 m^2   Magnesium    Collection Time: 04/01/19  5:59 AM   Result Value Ref Range    Magnesium 2.1 1.6 - 2.6 mg/dL   Phosphorus    Collection Time: 04/01/19  5:59 AM   Result Value Ref Range    Phosphorus 4.4 2.7 - 4.5 mg/dL   Protime-INR    Collection Time: 04/01/19  5:59 AM   Result Value Ref Range    Prothrombin Time 14.1 (H) 9.0 - 12.5 sec    INR 1.4 (H) 0.8 - 1.2   Protime-INR    Collection Time: 04/01/19  5:59 AM   Result Value Ref Range    Prothrombin Time 14.1 (H) 9.0 - 12.5 sec    INR 1.4 (H) 0.8 - 1.2   CBC auto differential    Collection Time: 04/01/19  5:59 AM   Result Value Ref Range    WBC 7.01 3.90 - 12.70 K/uL    RBC 2.37 (L) 4.60 - 6.20 M/uL    Hemoglobin 7.6 (L) 14.0 - 18.0 g/dL    Hematocrit 22.9 (L) 40.0 - 54.0 %    MCV 97 82 - 98 fL    MCH 32.1 (H) 27.0 - 31.0 pg    MCHC 33.2 32.0 - 36.0 g/dL    RDW 16.3 (H) 11.5 - 14.5 %    Platelets 111 (L) 150 - 350 K/uL    MPV 10.7 9.2 - 12.9 fL    Immature Granulocytes 1.1 (H) 0.0 - 0.5 %    Gran # (ANC) 4.6 1.8 - 7.7 K/uL    Immature Grans (Abs) 0.08 (H) 0.00 - 0.04  K/uL    Lymph # 1.7 1.0 - 4.8 K/uL    Mono # 0.5 0.3 - 1.0 K/uL    Eos # 0.1 0.0 - 0.5 K/uL    Baso # 0.01 0.00 - 0.20 K/uL    nRBC 0 0 /100 WBC    Gran% 65.3 38.0 - 73.0 %    Lymph% 24.8 18.0 - 48.0 %    Mono% 7.4 4.0 - 15.0 %    Eosinophil% 1.3 0.0 - 8.0 %    Basophil% 0.1 0.0 - 1.9 %    Differential Method Automated    POCT glucose    Collection Time: 04/01/19 12:10 PM   Result Value Ref Range    POCT Glucose 92 70 - 110 mg/dL       Diagnostic Results     Brain Imaging   CTH non-contrast (3/28/2019):   Evolving subacute to chronic appearing infarcts in L thalamus and L PCA territory  small remote lacune in R putamen.  Cytotoxic cerebral edema        MRI Brain (3/26/19):  New acute left PCA territory distribution infarctions involving L thalamus and L paramedian occipital lobe, as well as L splenium of the corpus callosum   Generalized cerebral volume loss   significant chronic microvascular ischemic disease.      MRI Brain (3/24/19):  Vascular findings similar to CTH and CTA    Vessel Imaging   CTA-MP (3/24/19):  Extensive intracranial atherosclerotic disease:  Left PCA with a high-grade stenosis. High-grade (>70%) stenosis right proximal ICA and moderate (50-70%) stenosis left proximal ICA. Moderate to high-grade stenosis of the intra cavernous/ supraclinoid ICAs bilaterally. High-grade stenosis distal left vertebral artery.    Cardiac Imaging   TTE (3/23/19):  EF 55%, LVH  severe bilateral atrial enlargement   PA pressure  41   PFO with left to right shunting        Barb Arce PAJumaC  Comprehensive Stroke Center  Department of Vascular Neurology   [facility]

## 2019-04-01 NOTE — PLAN OF CARE
Problem: Adult Inpatient Plan of Care  Goal: Plan of Care Review  Past Medical History:  7/29/2016: NICK (acute kidney injury)  No date: Allergy  12/15/2014: Anemia, mild  No date: Arthritis      Comment:  Gout  3/27/2012: Benign essential HTN  5/10/2018: BMI 29.0-29.9,adult  No date: BPH (benign prostatic hyperplasia)  No date: BPH (benign prostatic hyperplasia)  2006: CAD (coronary artery disease)  No date: Chronic kidney disease      Comment:  due to ibuprofen  No date: Colon polyp  No date: CRF (chronic renal failure), stage 5  No date: Diverticulosis  No date: Gastritis  No date: GERD (gastroesophageal reflux disease)  No date: Gout  5/3/2018: History of colon polyps  3/27/2012: HTN (hypertension)  No date: Hyperlipidemia  No date: Hyperlipidemia  6/14/2018: LLL pneumonia  No date: LVH (left ventricular hypertrophy)  No date: Mesenteric ischemia  3/27/2012: Murmur, cardiac  No date: GRICELDA (obstructive sleep apnea)      Comment:  DOES NOT USE A MACHINE  No date: Sinus problem  No date: Syncope and collapse      Past Surgical History:  No date: APPENDECTOMY  5/31/2016: BLOCK-NERVE; Left      Comment:  Performed by Kevin Leon MD at [facility] OR  No date: CARDIAC CATHETERIZATION  2011: COLONOSCOPY  5/3/2018: COLONOSCOPY; N/A      Comment:  Performed by Messi Harris MD at [facility] ENDO  9/10/2015: COLONOSCOPY; N/A      Comment:  Performed by Messi Harris MD at [facility] ENDO  4/2007: CORONARY ARTERY BYPASS GRAFT      Comment:  x 1  8/30/2017: CYSTOSCOPY; N/A      Comment:  Performed by Rudy Herring MD at [facility] OR  11/10/2015: CYSTOSCOPY; N/A      Comment:  Performed by Rudy Herring MD at [facility] OR  1/4/2016: ESOPHAGOGASTRODUODENOSCOPY (EGD); N/A      Comment:  Performed by Tra Brooks MD at [facility] ENDO (2ND FLR)  10/15/2014: ESOPHAGOGASTRODUODENOSCOPY (EGD); N/A      Comment:  Performed by Messi Harris MD at [facility] ENDO  2/25/2016: INJECTION-STEROID-EPIDURAL-TRANSFORAMINAL; Left      Comment:  Performed by  Kevin Leon MD at [facility] OR  No date: JOINT REPLACEMENT      Comment:  left knee total replacement  X 3  No date: mid leftt finger      Comment:  from a sony  2016: MOLE REMOVAL  4/11/2016: RADIOFREQUENCY THERMOCOAGULATION (RFTC)-NERVE-MEDIAN   BRANCH-LUMBAR; Left      Comment:  Performed by Kevin Leon MD at [facility] OR  No date: rotative cuff      Comment:  no rotative cuffs on bilat shoulders has pins   No date: SHOULDER SURGERY      Comment:  shoulder surgery bilat  RIGHT X 4; LEFT X 3  11/10/2015: TRANSRECTAL ULTRASOUND GUIDED PROSTATE BIOPSY; Bilateral      Comment:  Performed by Rudy Herring MD at [facility] OR  5/31/2017: ULTRASOUND-ENDOSCOPIC-UPPER; N/A      Comment:  Performed by Tra Brooks MD at [facility] ENDO (2ND FLR)  1/4/2016: ULTRASOUND-ENDOSCOPIC-UPPER; N/A      Comment:  Performed by Tra Brooks MD at [facility] ENDO (2ND FLR)  1/16/2015: ULTRASOUND-ENDOSCOPIC-UPPER; N/A      Comment:  Performed by Jason Saleem MD at [facility] ENDO (2ND FLR)    Patient likes blankets on.    Outcome: Ongoing (interventions implemented as appropriate)  Pt global aphasic. Alert to name call and noxious stimuli. Able to follow some commands as evidenced by pt opening mouth when asked. Rt arm with edema elevated on pillow. Turned and repositioned frequently. Blood transfusion received and tolerated well.  No apparent distress noted. Bed in lowest position and locked, call light within reach, side rails X2, and slip resistant socks on at this time. Will continue to monitor

## 2019-04-01 NOTE — PLAN OF CARE
Problem: Adult Inpatient Plan of Care  Goal: Plan of Care Review  Past Medical History:  7/29/2016: NICK (acute kidney injury)  No date: Allergy  12/15/2014: Anemia, mild  No date: Arthritis      Comment:  Gout  3/27/2012: Benign essential HTN  5/10/2018: BMI 29.0-29.9,adult  No date: BPH (benign prostatic hyperplasia)  No date: BPH (benign prostatic hyperplasia)  2006: CAD (coronary artery disease)  No date: Chronic kidney disease      Comment:  due to ibuprofen  No date: Colon polyp  No date: CRF (chronic renal failure), stage 5  No date: Diverticulosis  No date: Gastritis  No date: GERD (gastroesophageal reflux disease)  No date: Gout  5/3/2018: History of colon polyps  3/27/2012: HTN (hypertension)  No date: Hyperlipidemia  No date: Hyperlipidemia  6/14/2018: LLL pneumonia  No date: LVH (left ventricular hypertrophy)  No date: Mesenteric ischemia  3/27/2012: Murmur, cardiac  No date: GRICELDA (obstructive sleep apnea)      Comment:  DOES NOT USE A MACHINE  No date: Sinus problem  No date: Syncope and collapse      Past Surgical History:  No date: APPENDECTOMY  5/31/2016: BLOCK-NERVE; Left      Comment:  Performed by Kevin Leon MD at [facility] OR  No date: CARDIAC CATHETERIZATION  2011: COLONOSCOPY  5/3/2018: COLONOSCOPY; N/A      Comment:  Performed by Messi Harris MD at [facility] ENDO  9/10/2015: COLONOSCOPY; N/A      Comment:  Performed by Messi Harris MD at [facility] ENDO  4/2007: CORONARY ARTERY BYPASS GRAFT      Comment:  x 1  8/30/2017: CYSTOSCOPY; N/A      Comment:  Performed by Rudy Herring MD at [facility] OR  11/10/2015: CYSTOSCOPY; N/A      Comment:  Performed by Rudy Herring MD at [facility] OR  1/4/2016: ESOPHAGOGASTRODUODENOSCOPY (EGD); N/A      Comment:  Performed by Tra Brooks MD at [facility] ENDO (2ND FLR)  10/15/2014: ESOPHAGOGASTRODUODENOSCOPY (EGD); N/A      Comment:  Performed by Messi Harris MD at [facility] ENDO  2/25/2016: INJECTION-STEROID-EPIDURAL-TRANSFORAMINAL; Left      Comment:  Performed by  Kevin Leon MD at [facility] OR  No date: JOINT REPLACEMENT      Comment:  left knee total replacement  X 3  No date: mid leftt finger      Comment:  from a sony  2016: MOLE REMOVAL  4/11/2016: RADIOFREQUENCY THERMOCOAGULATION (RFTC)-NERVE-MEDIAN   BRANCH-LUMBAR; Left      Comment:  Performed by Kevin Leon MD at [facility] OR  No date: rotative cuff      Comment:  no rotative cuffs on bilat shoulders has pins   No date: SHOULDER SURGERY      Comment:  shoulder surgery bilat  RIGHT X 4; LEFT X 3  11/10/2015: TRANSRECTAL ULTRASOUND GUIDED PROSTATE BIOPSY; Bilateral      Comment:  Performed by Rudy Herring MD at [facility] OR  5/31/2017: ULTRASOUND-ENDOSCOPIC-UPPER; N/A      Comment:  Performed by Tra Brooks MD at [facility] ENDO (2ND FLR)  1/4/2016: ULTRASOUND-ENDOSCOPIC-UPPER; N/A      Comment:  Performed by Tra Brooks MD at [facility] ENDO (2ND FLR)  1/16/2015: ULTRASOUND-ENDOSCOPIC-UPPER; N/A      Comment:  Performed by Jason Saleem MD at [facility] ENDO (2ND FLR)    Patient likes blankets on.    Outcome: Ongoing (interventions implemented as appropriate)  Hemodialysis tx complete, 0 UF per orders, tx duration of 3 hours tolerated well. Blood returned via CHRISTO AVF, 15g needles removed x2, gauze and tape applied, pressure held to each site for 5 minutes, hemostasis achieved

## 2019-04-01 NOTE — PLAN OF CARE
04/01/19 1445   Discharge Reassessment   Assessment Type Discharge Planning Reassessment   Discharge Plan A Rehab   Discharge Plan B Skilled Nursing Facility

## 2019-04-01 NOTE — PROGRESS NOTES
[facility] H[facility]ODIALYSIS NOTE     Patient currently on hemodialysis for removal of uremic toxins and volume.     Patient seen and evaluated on hemodialysis, tolerating treatment, see HD flowsheet for vitals and assessments.      Ultrafiltration goal is No net ultrafiltration     Labs have been reviewed and the dialysate bath has been adjusted.     Assessment/Plan:  Seen on hemodialysis this morning, tolerating treatment well.  Good residual renal function, being diuresed with Torsemide 20 BID  No net ultrafiltration today.      Anemia of ESRD  Will start on EPO today, q MWF with HD  Iron panel in AM    BMM  Renal diet  Phos WNL.  No need for binders    ALLISON Suresh, FNP-BC  Nephrology  Pager:  [phone]

## 2019-04-01 NOTE — ASSESSMENT & PLAN NOTE
Large area of cytotoxic cerebral edema identified when reviewing brain imaging in the territory of the left posterior cerebral artery. There is mass effect associated with it. We will continue to monitor the patients clinical exam for any worsening of symptoms which may indicate expansion of the stroke or the area of the edema resulting in the clinical change. The pattern is suggestive of atheroembolic etiology.

## 2019-04-01 NOTE — SUBJECTIVE & OBJECTIVE
Neurologic Chief Complaint: RSW + difficulty with speech    Subjective:     Interval History: Patient is seen for follow-up neurological assessment and treatment recommendations:Visited patient in dialysis today.  On coumadin and atorvastatin for secondary stroke prevention. H/H stabilized at 7.6 after 1 unit pRBC transfusion on 03/31.  Epo given during dialysis.  Patient has worsening right groin hematoma.  Stat right femoral artery u/s ordered.  On clindamycin for suspected right arm cellulitis.  Sister-in-law concern for urination.  Patient was incontinent and making urine but today has not had any urination.  U/A and bladder scan ordered     HPI, Past Medical, Family, and Social History remains the same as documented in the initial encounter.     Review of Systems   Constitutional: Negative for diaphoresis and fever.   HENT: Negative for drooling, trouble swallowing and voice change.    Eyes: Positive for visual disturbance.   Respiratory: Negative for choking and shortness of breath.    Cardiovascular: Negative for palpitations.   Gastrointestinal: Negative for abdominal pain, nausea and vomiting.   Endocrine: Negative.    Skin: Negative.    Allergic/Immunologic: Negative.    Neurological: Positive for facial asymmetry, speech difficulty and weakness. Negative for seizures and light-headedness.   Psychiatric/Behavioral: Positive for confusion. Negative for agitation.     Scheduled Meds:   sodium chloride 0.9%   Intravenous Once    allopurinol  100 mg Oral Daily    atorvastatin  80 mg Oral Daily    carvedilol  25 mg Oral BID WM    clindamycin  300 mg Oral Q6H    epoetin kitty (PROCRIT) injection  10,000 Units Intravenous Every Mon, Wed, Fri    finasteride  5 mg Oral Daily    gabapentin  300 mg Oral QHS    insulin detemir U-100  8 Units Subcutaneous Daily    losartan  100 mg Oral QHS    pantoprazole  40 mg Oral Daily    polyethylene glycol  17 g Oral Daily    senna-docusate 8.6-50 mg  1 tablet Oral  BID    tamsulosin  1 capsule Oral Daily    torsemide  20 mg Oral BID    warfarin  7.5 mg Oral Daily     Continuous Infusions:    PRN Meds:sodium chloride, sodium chloride 0.9%, acetaminophen, albuterol-ipratropium, dextrose 50%, dextrose 50%, glucagon (human recombinant), glucose, glucose, insulin aspart U-100, nitroGLYCERIN, ondansetron    Objective:     Vital Signs (Most Recent):  Temp: 98.9 °F (37.2 °C) (04/01/19 0855)  Pulse: 79 (04/01/19 1230)  Resp: 18 (04/01/19 0855)  BP: (!) 199/52 (04/01/19 1230)  SpO2: (!) 92 % (04/01/19 0316)  BP Location: Left leg    Vital Signs Range (Last 24H):  Temp:  [97.5 °F (36.4 °C)-99.6 °F (37.6 °C)]   Pulse:  [58-90]   Resp:  [16-20]   BP: ()/(52-95)   SpO2:  [92 %-100 %]   BP Location: Left leg    Physical Exam   Constitutional: He appears well-developed and well-nourished. He is cooperative. He appears ill. No distress.   HENT:   Head: Normocephalic and atraumatic.   Eyes: Pupils are equal, round, and reactive to light.   Neck: Normal range of motion.   Cardiovascular: Normal rate. An irregularly irregular rhythm present.   Pulmonary/Chest: Effort normal. No accessory muscle usage. No respiratory distress.   Genitourinary:   Genitourinary Comments: Hematoma in the R groin, extending into scrotum and pelvic region      Musculoskeletal: He exhibits no edema.   Swelling has decreased, however TTP on R midline site   Neurological: He displays no tremor. He displays no seizure activity.   Skin: Skin is warm. He is not diaphoretic.   Psychiatric: His behavior is normal.   Nursing note and vitals reviewed.    Neurological Exam:   LOC: alert and follows commands  Language and articulation: notable aphasia, dysarthria    Visual Fields: no blink to threat right    Pupils (CN II, III): PERRL  Motor: antigravity in RUE, limited due to pain. 3/5, 3/5 RLE  Left side 5/5     Laboratory:  Recent Results (from the past 24 hour(s))   Type & Screen    Collection Time: 03/31/19  2:05 PM    Result Value Ref Range    Group & Rh B POS     Indirect Jasmin NEG    POCT glucose    Collection Time: 03/31/19  5:42 PM   Result Value Ref Range    POCT Glucose 163 (H) 70 - 110 mg/dL   POCT glucose    Collection Time: 03/31/19  9:09 PM   Result Value Ref Range    POCT Glucose 153 (H) 70 - 110 mg/dL   APTT    Collection Time: 04/01/19 12:08 AM   Result Value Ref Range    aPTT 25.7 21.0 - 32.0 sec   CBC auto differential    Collection Time: 04/01/19 12:40 AM   Result Value Ref Range    WBC 7.14 3.90 - 12.70 K/uL    RBC 2.45 (L) 4.60 - 6.20 M/uL    Hemoglobin 7.6 (L) 14.0 - 18.0 g/dL    Hematocrit 23.7 (L) 40.0 - 54.0 %    MCV 97 82 - 98 fL    MCH 31.0 27.0 - 31.0 pg    MCHC 32.1 32.0 - 36.0 g/dL    RDW 15.9 (H) 11.5 - 14.5 %    Platelets 92 (L) 150 - 350 K/uL    MPV 9.9 9.2 - 12.9 fL    Immature Granulocytes 1.7 (H) 0.0 - 0.5 %    Gran # (ANC) 4.7 1.8 - 7.7 K/uL    Immature Grans (Abs) 0.12 (H) 0.00 - 0.04 K/uL    Lymph # 1.7 1.0 - 4.8 K/uL    Mono # 0.5 0.3 - 1.0 K/uL    Eos # 0.1 0.0 - 0.5 K/uL    Baso # 0.01 0.00 - 0.20 K/uL    nRBC 0 0 /100 WBC    Gran% 66.1 38.0 - 73.0 %    Lymph% 24.1 18.0 - 48.0 %    Mono% 6.9 4.0 - 15.0 %    Eosinophil% 1.1 0.0 - 8.0 %    Basophil% 0.1 0.0 - 1.9 %    Differential Method Automated    Comprehensive metabolic panel    Collection Time: 04/01/19  5:59 AM   Result Value Ref Range    Sodium 141 136 - 145 mmol/L    Potassium 4.9 3.5 - 5.1 mmol/L    Chloride 112 (H) 95 - 110 mmol/L    CO2 20 (L) 23 - 29 mmol/L    Glucose 93 70 - 110 mg/dL    BUN, Bld 51 (H) 8 - 23 mg/dL    Creatinine 5.9 (H) 0.5 - 1.4 mg/dL    Calcium 7.7 (L) 8.7 - 10.5 mg/dL    Total Protein 4.9 (L) 6.0 - 8.4 g/dL    Albumin 2.6 (L) 3.5 - 5.2 g/dL    Total Bilirubin 0.8 0.1 - 1.0 mg/dL    Alkaline Phosphatase 76 55 - 135 U/L    AST 19 10 - 40 U/L    ALT 24 10 - 44 U/L    Anion Gap 9 8 - 16 mmol/L    eGFR if African American 9.6 (A) >60 mL/min/1.73 m^2    eGFR if non  8.3 (A) >60 mL/min/1.73 m^2    Magnesium    Collection Time: 04/01/19  5:59 AM   Result Value Ref Range    Magnesium 2.1 1.6 - 2.6 mg/dL   Phosphorus    Collection Time: 04/01/19  5:59 AM   Result Value Ref Range    Phosphorus 4.4 2.7 - 4.5 mg/dL   Protime-INR    Collection Time: 04/01/19  5:59 AM   Result Value Ref Range    Prothrombin Time 14.1 (H) 9.0 - 12.5 sec    INR 1.4 (H) 0.8 - 1.2   Protime-INR    Collection Time: 04/01/19  5:59 AM   Result Value Ref Range    Prothrombin Time 14.1 (H) 9.0 - 12.5 sec    INR 1.4 (H) 0.8 - 1.2   CBC auto differential    Collection Time: 04/01/19  5:59 AM   Result Value Ref Range    WBC 7.01 3.90 - 12.70 K/uL    RBC 2.37 (L) 4.60 - 6.20 M/uL    Hemoglobin 7.6 (L) 14.0 - 18.0 g/dL    Hematocrit 22.9 (L) 40.0 - 54.0 %    MCV 97 82 - 98 fL    MCH 32.1 (H) 27.0 - 31.0 pg    MCHC 33.2 32.0 - 36.0 g/dL    RDW 16.3 (H) 11.5 - 14.5 %    Platelets 111 (L) 150 - 350 K/uL    MPV 10.7 9.2 - 12.9 fL    Immature Granulocytes 1.1 (H) 0.0 - 0.5 %    Gran # (ANC) 4.6 1.8 - 7.7 K/uL    Immature Grans (Abs) 0.08 (H) 0.00 - 0.04 K/uL    Lymph # 1.7 1.0 - 4.8 K/uL    Mono # 0.5 0.3 - 1.0 K/uL    Eos # 0.1 0.0 - 0.5 K/uL    Baso # 0.01 0.00 - 0.20 K/uL    nRBC 0 0 /100 WBC    Gran% 65.3 38.0 - 73.0 %    Lymph% 24.8 18.0 - 48.0 %    Mono% 7.4 4.0 - 15.0 %    Eosinophil% 1.3 0.0 - 8.0 %    Basophil% 0.1 0.0 - 1.9 %    Differential Method Automated    POCT glucose    Collection Time: 04/01/19 12:10 PM   Result Value Ref Range    POCT Glucose 92 70 - 110 mg/dL       Diagnostic Results     Brain Imaging   CTH non-contrast (3/28/2019):   Evolving subacute to chronic appearing infarcts in L thalamus and L PCA territory  small remote lacune in R putamen.  Cytotoxic cerebral edema        MRI Brain (3/26/19):  New acute left PCA territory distribution infarctions involving L thalamus and L paramedian occipital lobe, as well as L splenium of the corpus callosum   Generalized cerebral volume loss   significant chronic microvascular  ischemic disease.      MRI Brain (3/24/19):  Vascular findings similar to CTH and CTA    Vessel Imaging   CTA-MP (3/24/19):  Extensive intracranial atherosclerotic disease:  Left PCA with a high-grade stenosis. High-grade (>70%) stenosis right proximal ICA and moderate (50-70%) stenosis left proximal ICA. Moderate to high-grade stenosis of the intra cavernous/ supraclinoid ICAs bilaterally. High-grade stenosis distal left vertebral artery.    Cardiac Imaging   TTE (3/23/19):  EF 55%, LVH  severe bilateral atrial enlargement   PA pressure  41   PFO with left to right shunting

## 2019-04-01 NOTE — PT/OT/SLP PROGRESS
Physical Therapy      Patient Name:  Micheal Hunt   MRN:  [id number]    Patient not seen today secondary to Unavailable (Comment)(receiving HD), unable to return in afternoon due to scheduling. Will follow-up 4/2/2019 as medically appropriate.    Salome Brennan, PT

## 2019-04-01 NOTE — ASSESSMENT & PLAN NOTE
Monitoring daily,continue to worsen  H/h decreasing Discontinued Heparin gtt  Stat right femoral arterial ultrasound ordered

## 2019-04-01 NOTE — PT/OT/SLP PROGRESS
Occupational Therapy      Patient Name:  Micheal Hunt   MRN:  [id number]    Patient not seen today secondary to patient ESTELA for HD.   Will follow up 4/2/2019 as appropriate.     DIANE Olmos  4/1/2019

## 2019-04-01 NOTE — DISCHARGE SUMMARY
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     Discharged Condition: Good    Hospital Course:   Micheal Hunt is a 78 y.o. male with HTN, HLD, CAD, LVH, ESRD stage V on dialysis (MW&F), mesenteric ischemia, asthma, and anemia who presents to the ED with acute t onset of difficulty breathing for 2 days. Patient presented to the emergency room department with similar complaints on admission dated 03/11/2019.  He reported he was feeling well when he was discharged from the hospital on March 15, 2019.  He went to dialysis straight from the hospital upon discharge and tolerated well.  He has reported increased shortness of breath dizziness and generalized weakness after dialysis.  He presented to the emergency room with complaints of shortness of breath, dizziness and weakness.  He reported he had been taking his nebulizers at home without any improvement of his shortness of breath. The patient called his  Pulmonologist's office and was referred to the ER for further evaluation. He was admitted 1 week ago for PNA on 3/11 and discharged 3 days ago on 3/15 to follow-up with his Pulmonologist Dr. Mihir Guillen on 3/22. Laboratory data and emergency room reviewed.  BNP has improved since previous admission. Patient was admitted to Hospitalist medicine service. Patient was evaluated by Dr. Rojo who continued routine hemodialysis treatments.  Dr. Guillen from Pulmonary Medicine evaluated the patient and treated with intravenous Solu-Medrol and bronchodilator nebulization treatments.  Resend currently patient continued to have high blood sugars for which patient was evaluated and counseled on diabetes mellitus.  Dr. Hidalgo from cardiology evaluated patient and reported patient likely has subclavian steal syndrome due to presence of AV fistula on left upper extremity.  He recommended patient to be transferred to [facility] for left heart catheterization and subsequent management.  Patient agreed for transfer.  Patient was accepted by Hospital Medicine in conjunction with consultation with Cardiology Department. Patient was discharged to  in stable condition with following discharge plan of care.     Consults: Dr. Rojo, Dr. Hidalgo, Dr. Guillen    Significant Diagnostic Studies:   Chest X-Ray: Mild pulmonary infiltrates favoring pneumonia. Cardiomegaly, atherosclerosis and prior sternotomy.    Microbiology Results (last 7 days)     ** No results found for the last 168 hours. **        Special Treatments/Procedures: None  Disposition:  [facility]    Medications:  Reconciled Home Medications:   Discharge Medication List as of 3/22/2019  7:43 PM      CONTINUE these medications which have NOT CHANGED    Details   aspirin (ECOTRIN) 81 MG EC tablet Take 81 mg by mouth once daily.  , Until Discontinued, Historical Med      atorvastatin (LIPITOR) 80 MG tablet TAKE 1 TABLET (80 MG TOTAL) BY MOUTH ONCE DAILY.,  Starting Wed 4/11/2018, Normal      budesonide-formoterol 160-4.5 mcg (SYMBICORT) 160-4.5 mcg/actuation HFAA Inhale 2 puffs into the lungs every 12 (twelve) hours. Controller, Starting Wed 11/21/2018, Until Thu 11/21/2019, Normal      celecoxib (CELEBREX) 200 MG capsule Take 1 capsule (200 mg total) by mouth once daily., Starting Thu 2/28/2019, Normal      finasteride (PROSCAR) 5 mg tablet TAKE 1 TABLET ONCE DAILY., Normal      fluticasone (FLONASE) 50 mcg/actuation nasal spray 2 sprays (100 mcg total) by Each Nare route once daily., Starting Wed 11/21/2018, Normal      gabapentin (NEURONTIN) 300 MG capsule Take 1 capsule (300 mg total) by mouth every evening., Starting Fri 10/19/2018, Until Sat 10/19/2019, Normal      isosorbide mononitrate (ISMO,MONOKET) 10 mg tablet TAKE 1 TABLET BY MOUTH EVERY DAY IN THE EVENING, Normal      !! meclizine (ANTIVERT) 25 mg tablet Take 1 tablet (25 mg total) by mouth 3 (three) times daily as needed for Dizziness., Starting Thu 3/14/2019, Normal      mirtazapine (REMERON) 7.5 MG Tab TAKE 1 TABLET BY MOUTH EVERY EVENING., Normal      NITROSTAT 0.4 mg SL tablet PLACE 1 TABLET (0.4 MG TOTAL) UNDER THE TONGUE EVERY 5 (FIVE) MINUTES AS NEEDED FOR CHEST PAIN., Starting Sun 5/20/2018, Until Mon 5/20/2019, Normal      omeprazole (PRILOSEC) 20 MG capsule TAKE 1 CAPSULE BY MOUTH EVERY DAY, Normal      tamsulosin (FLOMAX) 0.4 mg Cap TAKE 1 CAPSULE BY MOUTH EVERY DAY, Normal      tiotropium bromide (SPIRIVA RESPIMAT) 1.25 mcg/actuation Mist Inhale 2.5 mcg into the lungs once daily. Controller, Starting Thu 2/21/2019, Normal      torsemide (DEMADEX) 20 MG Tab Take 1 tablet (20 mg total) by mouth 2 (two) times daily., Starting Thu 3/14/2019, Until Fri 3/13/2020, Normal      warfarin (COUMADIN) 7.5 MG tablet Take 1 tablet (7.5 mg total) by mouth Daily., Starting Fri 3/15/2019, Until Sat 3/14/2020, Normal      !! zolpidem (AMBIEN) 5 MG Tab TAKE 1 TABLET BY MOUTH EVERY EVENING AS NEEDED, Normal       allopurinol (ZYLOPRIM) 100 MG tablet TAKE 1 TABLET BY MOUTH EVERY DAY, Normal      carvedilol (COREG) 6.25 MG tablet Take 1 tablet (6.25 mg total) by mouth 2 (two) times daily with meals., Starting Fri 10/19/2018, Until Sat 10/19/2019, Normal      losartan (COZAAR) 100 MG tablet TAKE 1 TABLET BY MOUTH EVERY DAY, Normal      predniSONE (DELTASONE) 20 MG tablet 3 pills for 3 days, 2 pills for 3 days, 1 pill for 3 days, repeat if needed., Normal      albuterol (PROVENTIL/VENTOLIN HFA) 90 mcg/actuation inhaler 2 puffs every 4 hours as needed for cough, wheeze, or shortness of breath, Normal      albuterol-ipratropium (DUO-NEB) 2.5 mg-0.5 mg/3 mL nebulizer solution Take 3 mLs by nebulization every 6 (six) hours as needed for Wheezing or Shortness of Breath., Starting Tue 2/26/2019, Print      !! meclizine (ANTIVERT) 25 mg tablet TAKE 1 TABLET BY MOUTH THREE TIMES A DAY AS NEEDED, Normal      sodium chloride (SALINE NASAL MIST) 3 % Mist 1 spray by Nasal route 2 (two) times daily., Starting Mon 9/25/2017, OTC      !! zolpidem (AMBIEN) 5 MG Tab TAKE 1 TABLET BY MOUTH EVERY EVENING AS NEEDED, Print       !! - Potential duplicate medications found. Please discuss with provider.      STOP taking these medications       azithromycin (Z-JEROD) 250 MG tablet Comments:   Reason for Stopping:             No discharge procedures on file.

## 2019-04-01 NOTE — ASSESSMENT & PLAN NOTE
77 y/o M with multiple co morbidities (A.fib, HTN, CHF, ESRD, DM) now with L PCA syndrome. Fluctuating neurological examination with RUE weakness, dysarthria and aphasia.  New stroke seen on repeat imaging.  He has significant intracranial/extrancranial atherosclerotic disease seen on CTA head/neck. Etiology cardioembolic versus atheroembolic.      - Anticoagulants: Coumadin daily adjusting for INR goal of 2.0-3.0   - Statins: Atorvastatin- 80 mg daily  -Diagnostic studies pending: INR and arterial ultrasound   - Aggressive risk factor modification: HTN, DM, HLD, A-Fib, CAD, intracranial atherosclerosis   - Rehab efforts: PT/OT to evaluate and treat ->recommending Rehab, SLP -> recommending Regular diet/nectar thick  - VTE prophylaxis: anticoagulated  - BP parameters: target sBP<160

## 2019-04-01 NOTE — PLAN OF CARE
Problem: Diabetes Comorbidity  Goal: Blood Glucose Level Within Desired Range  Outcome: Ongoing (interventions implemented as appropriate)  Blood glucose being monitored for hyper/hypoglycemic control. No s/s observed. Will cont to monitor.

## 2019-04-01 NOTE — ASSESSMENT & PLAN NOTE
Due to ESRD   receives EPO every 2 weeks   H/h continues to trend down.  Blood transfusion 03/31 1unit

## 2019-04-01 NOTE — PROGRESS NOTES
Maintenance hemodialysis tx started via [facility], tolerated well, flows good. Dialysis days are MWF

## 2019-04-01 NOTE — PT/OT/SLP PROGRESS
Speech Language Pathology      Micheal Hunt  MRN: [id number]    Patient not seen today secondary to dialysis  . Will follow-up 4/2    Ayana Borja MA, CCC-SLP

## 2019-04-01 NOTE — PLAN OF CARE
04/01/19 1429   Post-Acute Status   Post-Acute Authorization Placement   Post-Acute Placement Status Referrals Sent       [facility]. Will continue to follow and offer support as needed.     Braden Gomez LMSW  [facility]   Ext. [phone]

## 2019-04-01 NOTE — ASSESSMENT & PLAN NOTE
Risk factor for stroke   SBP<160  continue Torsemide 20 mg q12, losartan 100 mg qd, and Carvedilol 25 mg q12

## 2019-04-01 NOTE — ASSESSMENT & PLAN NOTE
Risk factor for stroke   CHADsVASc 7    - Carvedilol for rate control   - Heparin gtt discontinued due to drop in Hgb (8.2 > 7.7 > 7.1)  - coumadin 7.5mg

## 2019-04-02 PROBLEM — K42.9 UMBILICAL HERNIA: Status: ACTIVE | Noted: 2019-04-02

## 2019-04-02 PROBLEM — K42.9 UMBILICAL HERNIA WITHOUT OBSTRUCTION AND WITHOUT GANGRENE: Status: ACTIVE | Noted: 2019-04-02

## 2019-04-02 LAB
ALBUMIN SERPL BCP-MCNC: 2.8 G/DL (ref 3.5–5.2)
ALP SERPL-CCNC: 93 U/L (ref 55–135)
ALT SERPL W/O P-5'-P-CCNC: 29 U/L (ref 10–44)
ANION GAP SERPL CALC-SCNC: 10 MMOL/L (ref 8–16)
AST SERPL-CCNC: 21 U/L (ref 10–40)
BACTERIA #/AREA URNS AUTO: NORMAL /HPF
BASOPHILS # BLD AUTO: 0.02 K/UL (ref 0–0.2)
BASOPHILS # BLD AUTO: 0.02 K/UL (ref 0–0.2)
BASOPHILS NFR BLD: 0.3 % (ref 0–1.9)
BASOPHILS NFR BLD: 0.3 % (ref 0–1.9)
BILIRUB SERPL-MCNC: 0.8 MG/DL (ref 0.1–1)
BILIRUB UR QL STRIP: NEGATIVE
BUN SERPL-MCNC: 37 MG/DL (ref 8–23)
CALCIUM SERPL-MCNC: 8.1 MG/DL (ref 8.7–10.5)
CHLORIDE SERPL-SCNC: 107 MMOL/L (ref 95–110)
CLARITY UR REFRACT.AUTO: CLEAR
CO2 SERPL-SCNC: 24 MMOL/L (ref 23–29)
COLOR UR AUTO: YELLOW
CREAT SERPL-MCNC: 4.8 MG/DL (ref 0.5–1.4)
DIFFERENTIAL METHOD: ABNORMAL
DIFFERENTIAL METHOD: ABNORMAL
EOSINOPHIL # BLD AUTO: 0.1 K/UL (ref 0–0.5)
EOSINOPHIL # BLD AUTO: 0.1 K/UL (ref 0–0.5)
EOSINOPHIL NFR BLD: 1.3 % (ref 0–8)
EOSINOPHIL NFR BLD: 1.4 % (ref 0–8)
ERYTHROCYTE [DISTWIDTH] IN BLOOD BY AUTOMATED COUNT: 15.6 % (ref 11.5–14.5)
ERYTHROCYTE [DISTWIDTH] IN BLOOD BY AUTOMATED COUNT: 15.6 % (ref 11.5–14.5)
EST. GFR  (AFRICAN AMERICAN): 12.4 ML/MIN/1.73 M^2
EST. GFR  (NON AFRICAN AMERICAN): 10.7 ML/MIN/1.73 M^2
GLUCOSE SERPL-MCNC: 147 MG/DL (ref 70–110)
GLUCOSE UR QL STRIP: NEGATIVE
HCT VFR BLD AUTO: 23.1 % (ref 40–54)
HCT VFR BLD AUTO: 25.6 % (ref 40–54)
HGB BLD-MCNC: 7.8 G/DL (ref 14–18)
HGB BLD-MCNC: 8.6 G/DL (ref 14–18)
HGB UR QL STRIP: ABNORMAL
HYALINE CASTS UR QL AUTO: 0 /LPF
IMM GRANULOCYTES # BLD AUTO: 0.08 K/UL (ref 0–0.04)
IMM GRANULOCYTES # BLD AUTO: 0.11 K/UL (ref 0–0.04)
IMM GRANULOCYTES NFR BLD AUTO: 1.2 % (ref 0–0.5)
IMM GRANULOCYTES NFR BLD AUTO: 1.5 % (ref 0–0.5)
INR PPP: 1.9 (ref 0.8–1.2)
INR PPP: 1.9 (ref 0.8–1.2)
KETONES UR QL STRIP: NEGATIVE
LEUKOCYTE ESTERASE UR QL STRIP: NEGATIVE
LYMPHOCYTES # BLD AUTO: 1.2 K/UL (ref 1–4.8)
LYMPHOCYTES # BLD AUTO: 1.4 K/UL (ref 1–4.8)
LYMPHOCYTES NFR BLD: 17.2 % (ref 18–48)
LYMPHOCYTES NFR BLD: 19.8 % (ref 18–48)
MCH RBC QN AUTO: 31.7 PG (ref 27–31)
MCH RBC QN AUTO: 32.4 PG (ref 27–31)
MCHC RBC AUTO-ENTMCNC: 33.6 G/DL (ref 32–36)
MCHC RBC AUTO-ENTMCNC: 33.8 G/DL (ref 32–36)
MCV RBC AUTO: 95 FL (ref 82–98)
MCV RBC AUTO: 96 FL (ref 82–98)
MICROSCOPIC COMMENT: NORMAL
MONOCYTES # BLD AUTO: 0.5 K/UL (ref 0.3–1)
MONOCYTES # BLD AUTO: 0.6 K/UL (ref 0.3–1)
MONOCYTES NFR BLD: 6.8 % (ref 4–15)
MONOCYTES NFR BLD: 8.2 % (ref 4–15)
NEUTROPHILS # BLD AUTO: 4.7 K/UL (ref 1.8–7.7)
NEUTROPHILS # BLD AUTO: 5.2 K/UL (ref 1.8–7.7)
NEUTROPHILS NFR BLD: 69.2 % (ref 38–73)
NEUTROPHILS NFR BLD: 72.8 % (ref 38–73)
NITRITE UR QL STRIP: NEGATIVE
NRBC BLD-RTO: 1 /100 WBC
NRBC BLD-RTO: 1 /100 WBC
PH UR STRIP: >8 [PH] (ref 5–8)
PLATELET # BLD AUTO: 98 K/UL (ref 150–350)
PLATELET # BLD AUTO: 98 K/UL (ref 150–350)
PMV BLD AUTO: 9.6 FL (ref 9.2–12.9)
PMV BLD AUTO: 9.7 FL (ref 9.2–12.9)
POCT GLUCOSE: 144 MG/DL (ref 70–110)
POCT GLUCOSE: 167 MG/DL (ref 70–110)
POTASSIUM SERPL-SCNC: 4.2 MMOL/L (ref 3.5–5.1)
PROT SERPL-MCNC: 5.4 G/DL (ref 6–8.4)
PROT UR QL STRIP: ABNORMAL
PROTHROMBIN TIME: 18.2 SEC (ref 9–12.5)
PROTHROMBIN TIME: 18.2 SEC (ref 9–12.5)
RBC # BLD AUTO: 2.41 M/UL (ref 4.6–6.2)
RBC # BLD AUTO: 2.71 M/UL (ref 4.6–6.2)
RBC #/AREA URNS AUTO: 3 /HPF (ref 0–4)
SODIUM SERPL-SCNC: 141 MMOL/L (ref 136–145)
SP GR UR STRIP: 1.01 (ref 1–1.03)
URN SPEC COLLECT METH UR: ABNORMAL
WBC # BLD AUTO: 6.81 K/UL (ref 3.9–12.7)
WBC # BLD AUTO: 7.19 K/UL (ref 3.9–12.7)
WBC #/AREA URNS AUTO: 2 /HPF (ref 0–5)

## 2019-04-02 PROCEDURE — 99223 PR INITIAL HOSPITAL CARE,LEVL III: ICD-10-PCS | Mod: AI,,, | Performed by: HOSPITALIST

## 2019-04-02 PROCEDURE — 87040 BLOOD CULTURE FOR BACTERIA: CPT

## 2019-04-02 PROCEDURE — 92507 TX SP LANG VOICE COMM INDIV: CPT

## 2019-04-02 PROCEDURE — 85610 PROTHROMBIN TIME: CPT

## 2019-04-02 PROCEDURE — 36415 COLL VENOUS BLD VENIPUNCTURE: CPT

## 2019-04-02 PROCEDURE — 85025 COMPLETE CBC W/AUTO DIFF WBC: CPT | Mod: 91

## 2019-04-02 PROCEDURE — 25000003 PHARM REV CODE 250: Performed by: STUDENT IN AN ORGANIZED HEALTH CARE EDUCATION/TRAINING PROGRAM

## 2019-04-02 PROCEDURE — 63600175 PHARM REV CODE 636 W HCPCS: Performed by: PHYSICIAN ASSISTANT

## 2019-04-02 PROCEDURE — 25000003 PHARM REV CODE 250: Performed by: INTERNAL MEDICINE

## 2019-04-02 PROCEDURE — 81001 URINALYSIS AUTO W/SCOPE: CPT

## 2019-04-02 PROCEDURE — 99233 SBSQ HOSP IP/OBS HIGH 50: CPT | Mod: GC,,, | Performed by: PSYCHIATRY & NEUROLOGY

## 2019-04-02 PROCEDURE — 99233 PR SUBSEQUENT HOSPITAL CARE,LEVL III: ICD-10-PCS | Mod: GC,,, | Performed by: PSYCHIATRY & NEUROLOGY

## 2019-04-02 PROCEDURE — 99223 1ST HOSP IP/OBS HIGH 75: CPT | Mod: AI,,, | Performed by: HOSPITALIST

## 2019-04-02 PROCEDURE — 80053 COMPREHEN METABOLIC PANEL: CPT

## 2019-04-02 PROCEDURE — 20600001 HC STEP DOWN PRIVATE ROOM

## 2019-04-02 PROCEDURE — 25000003 PHARM REV CODE 250: Performed by: PHYSICIAN ASSISTANT

## 2019-04-02 PROCEDURE — 25000003 PHARM REV CODE 250: Performed by: HOSPITALIST

## 2019-04-02 PROCEDURE — 99222 1ST HOSP IP/OBS MODERATE 55: CPT | Mod: ,,, | Performed by: NURSE PRACTITIONER

## 2019-04-02 PROCEDURE — 87040 BLOOD CULTURE FOR BACTERIA: CPT | Mod: 59

## 2019-04-02 PROCEDURE — 99222 PR INITIAL HOSPITAL CARE,LEVL II: ICD-10-PCS | Mod: ,,, | Performed by: NURSE PRACTITIONER

## 2019-04-02 RX ORDER — WARFARIN SODIUM 5 MG/1
5 TABLET ORAL DAILY
Status: DISCONTINUED | OUTPATIENT
Start: 2019-04-02 | End: 2019-04-03

## 2019-04-02 RX ORDER — RAMELTEON 8 MG/1
8 TABLET ORAL NIGHTLY PRN
Status: DISCONTINUED | OUTPATIENT
Start: 2019-04-02 | End: 2019-04-09 | Stop reason: HOSPADM

## 2019-04-02 RX ORDER — VANCOMYCIN HCL IN 5 % DEXTROSE 1.25 G/25
1250 PLASTIC BAG, INJECTION (ML) INTRAVENOUS ONCE
Status: COMPLETED | OUTPATIENT
Start: 2019-04-02 | End: 2019-04-02

## 2019-04-02 RX ORDER — CEFEPIME HYDROCHLORIDE 1 G/1
1 INJECTION, POWDER, FOR SOLUTION INTRAMUSCULAR; INTRAVENOUS EVERY 24 HOURS
Status: DISCONTINUED | OUTPATIENT
Start: 2019-04-02 | End: 2019-04-03

## 2019-04-02 RX ADMIN — STANDARDIZED SENNA CONCENTRATE AND DOCUSATE SODIUM 1 TABLET: 8.6; 5 TABLET, FILM COATED ORAL at 09:04

## 2019-04-02 RX ADMIN — CARVEDILOL 25 MG: 25 TABLET, FILM COATED ORAL at 08:04

## 2019-04-02 RX ADMIN — CEFEPIME 1 G: 1 INJECTION, POWDER, FOR SOLUTION INTRAMUSCULAR; INTRAVENOUS at 02:04

## 2019-04-02 RX ADMIN — WARFARIN SODIUM 5 MG: 5 TABLET ORAL at 05:04

## 2019-04-02 RX ADMIN — LOSARTAN POTASSIUM 100 MG: 50 TABLET, FILM COATED ORAL at 10:04

## 2019-04-02 RX ADMIN — FINASTERIDE 5 MG: 5 TABLET, FILM COATED ORAL at 10:04

## 2019-04-02 RX ADMIN — INSULIN DETEMIR 8 UNITS: 100 INJECTION, SOLUTION SUBCUTANEOUS at 10:04

## 2019-04-02 RX ADMIN — CLINDAMYCIN HYDROCHLORIDE 300 MG: 150 CAPSULE ORAL at 12:04

## 2019-04-02 RX ADMIN — ACETAMINOPHEN 650 MG: 325 TABLET ORAL at 10:04

## 2019-04-02 RX ADMIN — ALLOPURINOL 100 MG: 100 TABLET ORAL at 10:04

## 2019-04-02 RX ADMIN — PANTOPRAZOLE SODIUM 40 MG: 40 TABLET, DELAYED RELEASE ORAL at 10:04

## 2019-04-02 RX ADMIN — CARVEDILOL 25 MG: 25 TABLET, FILM COATED ORAL at 05:04

## 2019-04-02 RX ADMIN — ATORVASTATIN CALCIUM 80 MG: 20 TABLET, FILM COATED ORAL at 10:04

## 2019-04-02 RX ADMIN — TORSEMIDE 20 MG: 20 TABLET ORAL at 10:04

## 2019-04-02 RX ADMIN — STANDARDIZED SENNA CONCENTRATE AND DOCUSATE SODIUM 1 TABLET: 8.6; 5 TABLET, FILM COATED ORAL at 10:04

## 2019-04-02 RX ADMIN — TAMSULOSIN HYDROCHLORIDE 0.4 MG: 0.4 CAPSULE ORAL at 10:04

## 2019-04-02 RX ADMIN — Medication 1250 MG: at 02:04

## 2019-04-02 RX ADMIN — GABAPENTIN 300 MG: 300 CAPSULE ORAL at 09:04

## 2019-04-02 RX ADMIN — POLYETHYLENE GLYCOL 3350 17 G: 17 POWDER, FOR SOLUTION ORAL at 10:04

## 2019-04-02 NOTE — SUBJECTIVE & OBJECTIVE
Past Medical History:   Diagnosis Date    NICK (acute kidney injury) 7/29/2016    Allergy     Anemia, mild 12/15/2014    Arthritis     Gout    Benign essential HTN 3/27/2012    BMI 29.0-29.9,adult 5/10/2018    BPH (benign prostatic hyperplasia)     BPH (benign prostatic hyperplasia)     CAD (coronary artery disease) 2006    Chronic kidney disease     due to ibuprofen    Colon polyp     CRF (chronic renal failure), stage 5     Diverticulosis     Gastritis     GERD (gastroesophageal reflux disease)     Gout     History of colon polyps 5/3/2018    HTN (hypertension) 3/27/2012    Hyperlipidemia     Hyperlipidemia     LLL pneumonia 6/14/2018    LVH (left ventricular hypertrophy)     Mesenteric ischemia     Murmur, cardiac 3/27/2012    GRICELDA (obstructive sleep apnea)     DOES NOT USE A MACHINE    Sinus problem     Syncope and collapse        Past Surgical History:   Procedure Laterality Date    APPENDECTOMY      BLOCK-NERVE Left 5/31/2016    [facility] OR    CARDIAC CATHETERIZATION      COLONOSCOPY  2011    COLONOSCOPY N/A 5/3/2018    [facility]    COLONOSCOPY N/A 9/10/2015    [facility]    CORONARY ARTERY BYPASS GRAFT  4/2007    x 1    CYSTOSCOPY N/A 8/30/2017    [facility]    CYSTOSCOPY N/A 11/10/2015    [facility] OR    ESOPHAGOGASTRODUODENOSCOPY (EGD) N/A 1/4/2016    [facility] (2ND FLR)    ESOPHAGOGASTRODUODENOSCOPY (EGD) N/A 10/15/2014    [facility] at [facility] END[facility]    INJECTION-STEROID-EPIDURAL-TRANSFORAMINAL Left 2/25/2016    [facility]    JOINT REPLACEMENT      left knee total replacement  X 3    mid leftt finger      from a cactuss    MOLE REMOVAL  2016    RADIOFREQUENCY THERMOCOAGULATION (RFTC)-NERVE-MEDIAN BRANCH-LUMBAR Left  4/11/2016    [facility]    rotative cuff      no rotative cuffs on bilat shoulders has pins     SHOULDER SURGERY      shoulder surgery bilat  RIGHT X 4; LEFT X 3    TRANSRECTAL ULTRASOUND GUIDED PROSTATE BIOPSY Bilateral 11/10/2015    [facility] OR    ULTRASOUND-ENDOSCOPIC-UPPER N/A 5/31/2017    [facility] at [facility] ENDO (2ND FLR)    ULTRASOUND-ENDOSCOPIC-UPPER N/A 1/4/2016    Performed [facility] Tra [facility] at [facility] ENDO (2ND FLR)    ULTRASOUND-ENDOSCOPIC-UPPER N/A 1/16/2015    Performed by Jason Saleem MD at [facility] ENDO (2ND FLR)       Review of patient's allergies indicates:   Allergen Reactions    Ace inhibitors Other (See Comments)     Cough    Arb-angiotensin receptor antagonist Itching    Eplerenone Other (See Comments)     Marked bradycardia, 40, tiredness and weakness      Sulfa (sulfonamide antibiotics) Itching     Patient says this was 10 years ago and doesn't remember what happened       No current facility-administered medications on file prior to encounter.      Current Outpatient Medications on File Prior to Encounter   Medication Sig    allopurinol (ZYLOPRIM) 100 MG tablet Take 100 mg by mouth once daily.    amLODIPine (NORVASC) 10 MG tablet Take 10 mg by mouth once daily.    aspirin (ECOTRIN) 81 MG EC tablet Take 81 mg by mouth once daily.      atorvastatin (LIPITOR) 80 MG tablet TAKE 1 TABLET (80 MG TOTAL) BY MOUTH ONCE DAILY.    budesonide-formoterol 160-4.5 mcg (SYMBICORT) 160-4.5 mcg/actuation HFAA Inhale 2 puffs into the lungs every 12 (twelve) hours. Controller    carvedilol (COREG) 12.5 MG tablet Take 12.5 mg by mouth 2 (two) times daily with meals.    celecoxib (CELEBREX) 200 MG capsule Take 1 capsule (200 mg total) by mouth once daily.    finasteride (PROSCAR) 5 mg tablet TAKE 1 TABLET ONCE DAILY.    fluticasone (FLONASE) 50 mcg/actuation nasal spray 2 sprays (100 mcg total) by Each Nare route  once daily.    gabapentin (NEURONTIN) 300 MG capsule Take 1 capsule (300 mg total) by mouth every evening.    isosorbide mononitrate (ISMO,MONOKET) 10 mg tablet TAKE 1 TABLET BY MOUTH EVERY DAY IN THE EVENING    meclizine (ANTIVERT) 25 mg tablet Take 1 tablet (25 mg total) by mouth 3 (three) times daily as needed for Dizziness.    mirtazapine (REMERON) 7.5 MG Tab TAKE 1 TABLET BY MOUTH EVERY EVENING.    montelukast (SINGULAIR) 10 mg tablet Take 10 mg by mouth every evening.    NITROSTAT 0.4 mg SL tablet PLACE 1 TABLET (0.4 MG TOTAL) UNDER THE TONGUE EVERY 5 (FIVE) MINUTES AS NEEDED FOR CHEST PAIN.    omeprazole (PRILOSEC) 20 MG capsule TAKE 1 CAPSULE BY MOUTH EVERY DAY    tamsulosin (FLOMAX) 0.4 mg Cap TAKE 1 CAPSULE BY MOUTH EVERY DAY    tiotropium bromide (SPIRIVA RESPIMAT) 1.25 mcg/actuation Mist Inhale 2.5 mcg into the lungs once daily. Controller    torsemide (DEMADEX) 20 MG Tab Take 1 tablet (20 mg total) by mouth 2 (two) times daily.    warfarin (COUMADIN) 7.5 MG tablet Take 1 tablet (7.5 mg total) by mouth Daily.    zolpidem (AMBIEN) 5 MG Tab TAKE 1 TABLET BY MOUTH EVERY EVENING AS NEEDED    albuterol (PROVENTIL/VENTOLIN HFA) 90 mcg/actuation inhaler 2 puffs every 4 hours as needed for cough, wheeze, or shortness of breath    albuterol-ipratropium (DUO-NEB) 2.5 mg-0.5 mg/3 mL nebulizer solution Take 3 mLs by nebulization every 6 (six) hours as needed for Wheezing or Shortness of Breath.     Family History     Problem Relation (Age of Onset)    Cancer Father    Heart disease Mother, Sister    Hypertension Brother    Pneumonia Sister    Stroke Sister    Sudden death Father        Tobacco Use    Smoking status: Never Smoker    Smokeless tobacco: Never Used   Substance and Sexual Activity    Alcohol use: No    Drug use: No    Sexual activity: Not on file     Review of Systems   Constitutional: Negative for fatigue and fever.   HENT: Negative for congestion and rhinorrhea.    Eyes: Negative for  pain and visual disturbance.   Respiratory: Negative for cough, shortness of breath and wheezing.    Cardiovascular: Negative for chest pain, palpitations and leg swelling.   Gastrointestinal: Negative for abdominal pain, nausea and vomiting.   Genitourinary: Negative for difficulty urinating, dysuria and hematuria.   Musculoskeletal: Negative for arthralgias and back pain.   Skin: Positive for color change. Negative for pallor.   Neurological: Positive for facial asymmetry, speech difficulty, weakness (R sided) and numbness. Negative for dizziness and headaches.   Hematological: Negative for adenopathy. Bruises/bleeds easily.   Psychiatric/Behavioral: Positive for confusion and decreased concentration. Negative for agitation. The patient is not nervous/anxious.      Objective:     Vital Signs (Most Recent):  Temp: 98.2 °F (36.8 °C) (04/02/19 0805)  Pulse: 68 (04/02/19 1100)  Resp: 18 (04/02/19 0805)  BP: (!) 155/76 (04/02/19 0805)  SpO2: 98 % (04/02/19 0805) Vital Signs (24h Range):  Temp:  [97.3 °F (36.3 °C)-100.6 °F (38.1 °C)] 98.2 °F (36.8 °C)  Pulse:  [63-87] 68  Resp:  [18] 18  SpO2:  [95 %-100 %] 98 %  BP: ()/(52-76) 155/76     Weight: 106.6 kg (235 lb 0.2 oz)  Body mass index is 31.01 kg/m².    Physical Exam   Constitutional: He appears well-developed and well-nourished. No distress.   HENT:   Head: Normocephalic and atraumatic.   Mouth/Throat: No oropharyngeal exudate.   Eyes: Pupils are equal, round, and reactive to light. EOM are normal. Right eye exhibits no discharge. Left eye exhibits no discharge.   Neck: Normal range of motion. Neck supple.   Cardiovascular: Normal rate and intact distal pulses.   No murmur heard.  Pulmonary/Chest: Effort normal and breath sounds normal. No stridor. No respiratory distress. He has no wheezes. He exhibits no tenderness.   Abdominal: Soft. Bowel sounds are normal. There is no tenderness. There is no guarding.   Musculoskeletal: He exhibits no edema or tenderness.    Neurological: He is alert.   Follows commands. Mostly nods to yes or no questions. Aphasic, dysarthria   Skin: Skin is warm and dry. He is not diaphoretic. There is erythema.   R groin hematoma extending to scrotum/pelvis, RUE and R foot bruising. Mild erythema to R forearm near wrist with vesicle, no TTP, no drainage    Psychiatric: He has a normal mood and affect. His behavior is normal.   Vitals reviewed.      Significant Labs:   CBC:   Recent Labs   Lab 04/01/19  0040 04/01/19  0559 04/02/19  1023   WBC 7.14 7.01 7.19   HGB 7.6* 7.6* 8.6*   HCT 23.7* 22.9* 25.6*   PLT 92* 111* 98*     CMP:   Recent Labs   Lab 04/01/19  0559 04/02/19  1023    141   K 4.9 4.2   * 107   CO2 20* 24   GLU 93 147*   BUN 51* 37*   CREATININE 5.9* 4.8*   CALCIUM 7.7* 8.1*   PROT 4.9* 5.4*   ALBUMIN 2.6* 2.8*   BILITOT 0.8 0.8   ALKPHOS 76 93   AST 19 21   ALT 24 29   ANIONGAP 9 10   EGFRNONAA 8.3* 10.7*       Significant Imaging: I have reviewed all pertinent imaging results/findings within the past 24 hours.

## 2019-04-02 NOTE — PROGRESS NOTES
Stroke book individualized and reviewed with family friend at bedside. Pt resting with eyes closed, opens eyes initially but goes back to sleep. Instructed on importance of low salt, low fat diet and medication compliance. Also instructed on when to call 911: sudden severe H/A , trouble seeing, speaking, understanding,moving or weakness/ numbness, family friend verbalized understanding. Stroke book at bedside.

## 2019-04-02 NOTE — HPI
Mr. Micheal Hunt, 78 y.o. male with history of HTN, HLD, GRICELDA, CAD (s/p CABG 2007), ESRD on HD MWF, CHFpEF, Afib on coumadin, DM2,and recent admission for PNA was transferred to [facility] [place], [place] from [place] for unstable angina and hrt cath.  Prior to heart cath, stroke code was called for right sided weakness, facial droop and dysarthria. MRI showed L PCA infarction, not candidate for TPA. Admitted to neuro critical care and started on heparin gtt. Underwent US of LE and of carotid with evidence of <50% L ICA stenosis and 60-70% R ICA stenosis, heavily calcified on CTA, additionally L vertebral also heavily calcified. Nephrology following for ESRD on HD. During hospitalization, pt noted to have gross swelling of R forearm - tense, pulses intact. Patient evaluated by orthopedic surgery regarding R forearm and feel that this is not compartment syndrome. Pt was started on clindamycin for suspected right arm cellulitis. On 3/31, pt was stepped down to vascular neurology. Pt transfused with pRBC x1 due to drop in Hbg. Discontinued heparin gtt. Pt also noted to have R groin hematoma, worsening. Stat right femoral artery u/s negative for any fluid collection, aneurysm, or stenosis. 4/2 Right arm edema worsening so switched antibiotics from oral clinda to IV vancomycin and cefepime. Hospital medicine consulted for multiple medical issues: R groin hematoma, R arm cellulitis management.

## 2019-04-02 NOTE — ASSESSMENT & PLAN NOTE
R forearm edema   Arterial line removed, evaluated by ortho for compartment syndrome which was negative as well as US  Stated on Clindamycin 300 q6 for possible cellulitis in the Neuro ICU (3/30), will continue x5 days  04/02 Edema worsening started IV vancomycin and cefepime.  Vanc one dose given today and vanc level will be drawn one hour tomorrow before dialysis is completed.    Defer to pharmacy after to decide what dosing to give.

## 2019-04-02 NOTE — ASSESSMENT & PLAN NOTE
+ abdominal pain during hospitalization  CT scan ordered showed umbilical hernia  General surgery consulted and recommended outpatient follow up

## 2019-04-02 NOTE — PROGRESS NOTES
Patient alert to name only, BRIDGETTE - KIERAE withdrawing to pain, other extremities strong. Patient resistant to cares and med administrations - stroke NP giorgio aware that patient refusing PO abx - would like CXR/blood work before witching abx - recommending letting patient rest and attempting cares later this AM if patient will allow for further work up. High fall risk and safety precautions maintained, will continue to monitor.

## 2019-04-02 NOTE — ASSESSMENT & PLAN NOTE
H/o and CT showed enlarged prostate   - continue flomax and finasteride  -has been making urine and several episodes of incontinence per sister in law but has not urinated since yesterday  -u/a negative, bladder scan pending

## 2019-04-02 NOTE — HOSPITAL COURSE
3/23/19:  Evaluated by PT.  Bed mobility, transfers, and gait (I)/SV.  3/24/19:  Stroke code.  3/27/19:  Evaluated by SLP.  Found to have dysarthria and cognitive-linguistic impairment.  SLP recommendation: regular diet and thin liquids.  3/28/19:  PT and OT on hold for decline.  SLP changed recommendation to regular diet and nectar thick liquids for decline/dysphagia.   3/29/19:  Evaluated by PT.  Bed mobility max-totalA.  EOB modA-CGA.  Sit to stand deferred 2/2 LE weakness and difficulty following instructions.    3/30/19:  Evaluated by OT.  Bed mobility mod-totalA.  No functional mobility/transfers 2/2 poor sitting balance.  Grooming modA and UBD maxA while seated EOB.  LBD totalA.   4/1/19:  No PT, OT, or SLP 2/2 HD.   4/2/19:  Declined PT and OT.    4/3/19:  Participated with SLP.  No PT or OT 2/2 HD.   4/4/19:  Participated with PT, OT, and SLP.  Bed mobility totalA (mod-maxA x 2 people).  EOB x 8 minutes maxA-CGA.  Transfers deferred 2/2 impaired trunk control, weakness, fatigue, and inconsistently following instructions.  Declined ADLs.   4/5/19:  No PT or OT 2/2 HD.  No SLP 2/2 unwilling to participate.    4/7/19:  Participated with PT.  Bed mobility maxA.  Sit to stand maxA.  EOB SBA-modA.

## 2019-04-02 NOTE — CONSULTS
Inpatient consult to Physical Medicine Rehab  Consult performed by: ROSALBA Perez  Consult ordered by: Barb Arce PA-C  Reason for consult: rehab evaluation       Consult received.  Reviewed patient history and current admission.  Rehab team following.  Full consult to follow.    ALLISON Del Angel, GAVIOTAP-C  Physical Medicine & Rehabilitation   04/02/2019  Spectralink: [id number]

## 2019-04-02 NOTE — ASSESSMENT & PLAN NOTE
-Hernia is asymptomatic  -Would not recommend repair until fully recovered from stroke and has cardiac clearance  -Will provide a PRN follow up as outpatient if patient ever desires to have hernia fixed

## 2019-04-02 NOTE — CONSULTS
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  Patient admitted to [facility] on 3/18/19 with COPD exacerbation.  Upon discharge, patient with unstable angina and was transferred to [facility] on 3/22/19 for further evaluation and management.  On arrival, Cardiology consulted and ACS protocol was not recommended.  On 3/24/19, patient developed right-sided weakness, facial droop, and dysarthria.  Stroke code called.  CTA stroke multiphase showed significant intracranial and extracranial atherosclerotic disease.  MRI brain revealed L thalamic infarct.  Vascular Neurology following, and etiology cardioembolic vs atheroembolic.  Hospital course further complicated by groin hematoma with anemia requiring 1 unit PRBC on 3/31, R arm cellulitis (started on PO Clindamycin), worsening neurologic deficits (repeat MRI brain on 3/26 showed new acute L PCA territory infarctions), and dysphagia (SLP following).  Stepped down to floor on 3/31/19.      Functional History: Patient lives in [place], [place], alone (daughter lives next door), in a single story home with 3 steps to enter (ramp access available).  Prior to admission, he was active and (I) with ADLs, including working ([employer]) and driving,  and mobility.  Occasionally used SC 2/2 arthritis.  He is right handed.  DME: SC.    Hospital Course:   3/23/19:  Evaluated by PT.  Bed mobility, transfers, and gait (I)/SV.  3/24/19:  Stroke code.  3/27/19:  Evaluated by SLP.  Found to have dysarthria and cognitive-linguistic impairment.  SLP recommendation: regular diet and thin liquids.  3/28/19:  PT and OT on hold for decline.  SLP changed recommendation to regular diet and nectar thick liquids for decline/dysphagia.   3/29/19:  Evaluated by PT.  Bed mobility max-totalA.  EOB modA-CGA.  Sit to stand deferred 2/2 LE weakness and difficulty following instructions.    3/30/19:  Evaluated by OT.  Bed mobility mod-totalA.  No functional mobility/transfers 2/2 poor sitting balance.  Grooming modA and UBD maxA while seated EOB.  LBD totalA.   4/1/19:  No PT, OT, or SLP 2/2 HD.     Past Medical History:   Diagnosis Date    NICK (acute kidney injury) 7/29/2016    Allergy     Anemia, mild 12/15/2014    Arthritis     Gout    Benign essential HTN 3/27/2012    BMI 29.0-29.9,adult 5/10/2018    BPH (benign prostatic hyperplasia)     BPH (benign prostatic hyperplasia)     CAD (coronary artery disease) 2006    Chronic kidney disease     due to ibuprofen    Colon polyp     CRF (chronic renal failure), stage 5     Diverticulosis     Gastritis     GERD (gastroesophageal reflux disease)     Gout     History of colon polyps 5/3/2018    HTN (hypertension) 3/27/2012    Hyperlipidemia     Hyperlipidemia     LLL pneumonia 6/14/2018    LVH (left ventricular hypertrophy)     Mesenteric ischemia     Murmur, cardiac 3/27/2012    GRICELDA (obstructive sleep apnea)     DOES NOT USE A MACHINE    Sinus problem     Syncope and collapse      Past Surgical History:   Procedure Laterality Date    APPENDECTOMY      BLOCK-NERVE Left 5/31/2016    [facility]    CARDIAC CATHETERIZATION      COLONOSCOPY  2011    COLONOSCOPY N/A 5/3/2018    Performed by  Messi Harris MD at [facility] ENDO    COLONOSCOPY N/A 9/10/2015    Performed by Messi Harris MD at [facility] ENDO    CORONARY ARTERY BYPASS GRAFT  4/2007    x 1    CYSTOSCOPY N/A 8/30/2017    [facility] at [facility] OR    CYSTOSCOPY N/A 11/10/2015    Performed by Rud[facility]ring MD at [facility] OR    ESOPHAGOGASTRODUODENOSCOPY (EGD) N/A 1/4/2016    Performed by Tra Brooks MD at [facility] ENDO (2ND FLR)    ESOPHAGOGASTRODUODENOSCOPY (EGD) N/A 10/15/2014    Performed by Messi Harris MD at [facility] ENDO    INJECTION-STEROID-EPIDURAL-TRANSFORAMINAL Left 2/25/2016    [facility] at [facility] OR    JOINT REPLACEMENT      left knee total replacement  X 3    mid leftt finger      from a cactuss    MOLE REMOVAL  2016    RADIOFREQUENCY THERMOCOAGULATION (RFTC)-NERVE-MEDIAN BRANCH-LUMBAR Left 4/11/2016    [facility] at [facility] OR    rotative cuff      no rotative cuffs on bilat shoulders has pins     SHOULDER SURGERY      shoulder surgery bilat  RIGHT X 4; LEFT X 3    TRANSRECTAL ULTRASOUND GUIDED PROSTATE BIOPSY Bilateral 11/10/2015    Performed by Rudy Herring MD at [facility] OR    ULTRASOUND-ENDOSCOPIC-UPPER N/A 5/31/2017    Performed by Tra Brooks [facility] at [facility] ENDO (2ND FLR)    ULTRASOUND-ENDOSCOPIC-UPPER N/A 1/4/2016    Performed by Tra Brooks MD at [facility] ENDO (2ND FLR)    ULTRASOUND-ENDOSCOPIC-UPPER N/A 1/16/2015    Performed by Jason Saleem MD at [facility] ENDO (2ND FLR)     Review of patient's allergies indicates:   Allergen Reactions    Ace inhibitors Other (See Comments)     Cough    Arb-angiotensin receptor antagonist Itching    Eplerenone Other (See Comments)     Marked bradycardia, 40, tiredness and weakness      Sulfa (sulfonamide antibiotics) Itching     Patient says this was 10 years ago and doesn't remember what happened       Scheduled Medications:    sodium chloride 0.9%   Intravenous Once    allopurinol  100 mg Oral Daily    atorvastatin   80 mg Oral Daily    carvedilol  25 mg Oral BID WM    clindamycin  300 mg Oral Q6H    epoetin kitty (PROCRIT) injection  10,000 Units Intravenous Every Mon, Wed, Fri    finasteride  5 mg Oral Daily    gabapentin  300 mg Oral QHS    insulin detemir U-100  8 Units Subcutaneous Daily    losartan  100 mg Oral QHS    pantoprazole  40 mg Oral Daily    polyethylene glycol  17 g Oral Daily    senna-docusate 8.6-50 mg  1 tablet Oral BID    tamsulosin  1 capsule Oral Daily    torsemide  20 mg Oral BID    warfarin  7.5 mg Oral Daily       PRN Medications: sodium chloride, sodium chloride 0.9%, acetaminophen, albuterol-ipratropium, dextrose 50%, dextrose 50%, glucagon (human recombinant), glucose, glucose, insulin aspart U-100, nitroGLYCERIN, ondansetron    Family History     Problem Relation (Age of Onset)    Cancer Father    Heart disease Mother, Sister    Hypertension Brother    Pneumonia Sister    Stroke Sister    Sudden death Father        Tobacco Use    Smoking status: Never Smoker    Smokeless tobacco: Never Used   Substance and Sexual Activity    Alcohol use: No    Drug use: No    Sexual activity: Not on file     Review of Systems   Constitutional: Positive for activity change and fatigue. Negative for chills and fever.   HENT: Positive for trouble swallowing and voice change. Negative for drooling and hearing loss.    Eyes: Positive for visual disturbance. Negative for pain.   Respiratory: Negative for cough, shortness of breath and wheezing.    Cardiovascular: Negative for chest pain and palpitations.   Gastrointestinal: Negative for abdominal pain, nausea and vomiting.   Genitourinary: Positive for difficulty urinating. Negative for flank pain.   Musculoskeletal: Positive for arthralgias, gait problem and myalgias. Negative for back pain and neck pain.   Skin: Positive for wound. Negative for rash.   Neurological: Positive for speech difficulty and weakness. Negative for dizziness and headaches.    Psychiatric/Behavioral: Negative for agitation and hallucinations. The patient is not nervous/anxious.      Objective:     Vital Signs (Most Recent):  Temp: 98.2 °F (36.8 °C) (04/02/19 0805)  Pulse: 86 (04/02/19 0805)  Resp: 18 (04/02/19 0805)  BP: (!) 155/76 (04/02/19 0805)  SpO2: 98 % (04/02/19 0805)    Vital Signs (24h Range):  Temp:  [97.3 °F (36.3 °C)-100.6 °F (38.1 °C)] 98.2 °F (36.8 °C)  Pulse:  [63-90] 86  Resp:  [18] 18  SpO2:  [95 %-100 %] 98 %  BP: ()/(47-83) 155/76     Body mass index is 31.01 kg/m².    Physical Exam   Constitutional: He appears well-developed and well-nourished. No distress.   HENT:   Head: Normocephalic and atraumatic.   Right Ear: External ear normal.   Left Ear: External ear normal.   Nose: Nose normal.   + dysphonia   Eyes: Right eye exhibits no discharge. Left eye exhibits no discharge. No scleral icterus.   Neck: Normal range of motion.   Cardiovascular: Normal rate, regular rhythm and intact distal pulses.   Pulmonary/Chest: Effort normal. No respiratory distress. He has no wheezes.   Abdominal: Soft. He exhibits no distension. There is no tenderness.   Musculoskeletal: Normal range of motion. He exhibits no edema.        Right forearm: He exhibits tenderness and swelling.   Neurological: He is alert. No sensory deficit. He exhibits normal muscle tone.   -  Mental Status:  Awake and alert.  Follows commands.  R neglect.    -  Speech and language:  + aphasia and dysarthria.    -  Vision:  R hemianopsia.   -  Facial movement (CN VII): symmetrical.   -  Motor:  RUE: 1/5.  LUE: 5/5.  RLE: 2/5.  LLE: 5/5.   Skin: Skin is warm and dry. No rash noted.   Psychiatric: He has a normal mood and affect. His behavior is normal.   Vitals reviewed.    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      Recommendations  -  Encourage mobility, OOB in chair, and early ambulation as appropriate   -  PT/OT evaluate and treat  -  SLP speech and cognitive evaluate and treat  -  Monitor mood and sleep disturbances  -  Establish consistent sleep-wake cycle  -  Monitor for bowel and bladder dysfunction  -  Monitor for shoulder pain and subluxation  -  Monitor for spasticity  -  DVT prophylaxis  -  Monitor for and prevent skin breakdown and pressure ulcers  · Early mobility, repositioning/weight shifting every 20-30 minutes when sitting, turn patient every 2 hours, proper mattress/overlay and chair cushioning, pressure relief/heel protector boots    Cytotoxic cerebral edema  -  2/2 stroke    Dysarthria due to acute cerebrovascular accident (CVA)  -  2/2 stroke  -  SLP following    Paroxysmal atrial fibrillation  -  Stroke risk factor  -  Management per Vascular Neurology--> on Coumadin     ESRD (end stage renal disease) on dialysis  -  Nephrology following--> HD MWF    Patient with therapy needs.  Functionally low level with limited therapy during admission.  Continue therapy.  Will follow progress for final post-acute recommendation.    Thank you for your consult.     ROSALBA Saavedra  Department of Physical Medicine & Rehab  [facility]

## 2019-04-02 NOTE — ASSESSMENT & PLAN NOTE
Cont flomax and finasteride  Has been making urine and several episodes of incontinence, documented unmeasured urine per nursing. Frequency of urination has decreased as PO intake has decreased but pt is still making urine and soaking the pad  UA negative for infection, bladder scan pending  Cont to monitor

## 2019-04-02 NOTE — ASSESSMENT & PLAN NOTE
Monitoring daily, increased in size per family at bedside. No TTP  Hgb improved from yesterday (7.6 > 8.6)  LE U/S negative for any fluid collection, aneurysm, or stenosis    Plan:  Cont to monitor daily CBCs  Cont to monitor size

## 2019-04-02 NOTE — PROGRESS NOTES
"[facility]  Adult Nutrition  Progress Note    SUMMARY       Recommendations    Recommendation/Intervention:     1. Continue current diet with texture per SLP.   2. If po intake <50%, recommend adding Boost Glucose Control with meals.   3. RD following.     Goals: meet >85% EEN/EPN  Nutrition Goal Status: new  Communication of RD Recs: (POC)    Reason for Assessment    Reason For Assessment: length of stay  Relevant Medical History:  HTN, HLD, obesity, T2DM on insulin, ESRD on HD, CAD s/p CABG (2007), CHF, a-fib, anemia of chronic disease  General Information Comments: Unable to see pt on multiple attempts. Soft diet with nectar thick liquids per SLP recommendations. Last HD done yesterday. Per chart review, pt ate 100% of lunch yesterday. Noted wt gain. Unable to fully assess for malnutrition at this time, but pt likely does not meet criteria. Will continue to monitor.     Nutrition Risk Screen    Nutrition Risk Screen: dysphagia or difficulty swallowing    Nutrition/Diet History    Spiritual, Cultural Beliefs, Synagogue Practices, Values that Affect Care: no  Factors Affecting Nutritional Intake: None identified at this time    Anthropometrics    Temp: 98.2 °F (36.8 °C)  Height Method: Stated  Height: 6' 1" (185.4 cm)(per RN 3/23)  Height (inches): 73 in  Weight Method: Standard Scale  Weight: 106.6 kg (235 lb 0.2 oz)  Weight (lb): 235.01 lb  Ideal Body Weight (IBW), Male: 184 lb  % Ideal Body Weight, Male (lb): 127.72 lb  BMI (Calculated): 31.1  BMI Grade: 30 - 34.9- obesity - grade I     Lab/Procedures/Meds    Pertinent Labs Reviewed: reviewed  Pertinent Labs Comments: BUN 51, Cr 5.9, GFR 8.3  Pertinent Medications Reviewed: reviewed  Pertinent Medications Comments: statin, carvedilol, insulin, docusate, warfarin    Estimated/Assessed Needs    Weight Used For Calorie Calculations: 106.6 kg (235 lb 0.2 oz)  Energy Calorie Requirements (kcal): 2292 kcal/day     Protein Requirements: 128 " gm/day(1.2 gm/kg)  Weight Used For Protein Calculations: 106.6 kg (235 lb 0.2 oz)  Fluid Requirements (mL): 2 mL/kcal or per MD     RDA Method (mL): 2292  CHO Requirement: 50% kcal from CHO    Nutrition Prescription Ordered    Current Diet Order: Soft Cardiac Renal   Nutrition Order Comments: Nectar Thick Liquids    Evaluation of Received Nutrient/Fluid Intake    I/O: -4.5L since admit  Comments: LBM 4/1  Tolerance: tolerating  % Intake of Estimated Energy Needs: 75 - 100 %  % Meal Intake: 75 - 100 %    Nutrition Risk    Level of Risk/Frequency of Follow-up: (f/u 1 x wk)     Assessment and Plan    No nutrition diagnosis at this time.      Monitor and Evaluation    Food and Nutrient Intake: food and beverage intake, energy intake  Food and Nutrient Adminstration: diet order  Physical Activity and Function: nutrition-related ADLs and IADLs  Anthropometric Measurements: weight, weight change, body mass index  Biochemical Data, Medical Tests and Procedures: electrolyte and renal panel, gastrointestinal profile, glucose/endocrine profile, inflammatory profile, lipid profile  Nutrition-Focused Physical Findings: overall appearance     Nutrition Follow-Up    RD Follow-up?: Yes

## 2019-04-02 NOTE — ASSESSMENT & PLAN NOTE
Risk factor for stroke   CHADsVASc 7    - Carvedilol for rate control   - Heparin gtt discontinued due to drop in Hgb (8.2 > 7.7 > 7.1)  - coumadin 5mg

## 2019-04-02 NOTE — PT/OT/SLP PROGRESS
"Physical Therapy      Patient Name:  Micheal Hunt   MRN:  [id number]    Patient not seen today secondary to Other (Comment)(First attempt patient with OT, second attempt patient having BM and patient's family friend refusing therapy for the day "I'm speaking on behalf of his daughter", "he is beyond exhausted today"). Patient also refusing to allow RN to take his vitals. Will follow-up 4/3/2019 as medically appropriate.    Salome Brennan, PT    "

## 2019-04-02 NOTE — PHYSICIAN QUERY
PT Name: Micheal Hunt  MR #: [id number]     Physician Query Form - Diagnosis Clarification      CDS: Alisson Covington RN, CCDS       Contact information: [email or web address]    This form is a permanent document in the medical record.     Query Date: April 2, 2019    By submitting this query, we are merely seeking further clarification of documentation.  Please utilize your independent clinical judgment when addressing the question(s) below.     The medical record contains the following:      Findings Supporting Clinical Information Location in Medical Record      NSTEMI        NSTEMI (non-ST elevated myocardial infarction)  -Tn elevated to 0.09 during admit, no EKG changes/WMA  - Unclear type I vs type II in setting of heart failure, pulmonary issues (?asthma, pna) for which he was being treated  - EF intact on echo 55%, no WMA  - No ongoing signs/symptoms of ischemia  - Recommend DAPT (ASA + plavix) if safe from a neurologic perspective; given elevated bleeding risk, single antiplatelet + anticoagulation also reasonable  - Continue coreg 25 bid, target resting HR 60  - Continue high intensity statin  - PRN nitro if CP, can add long-acting if recurrent  - Recommend medical management at this time given acute stroke; ideally would wait at least one month prior to reconsidering    Hx of ESRD on HD, hypertension, hyperlipidemia, CAD s/p CABG in 2007, paroxysmal AFib, chronic diastolic heart failure, DM 2, asthma not on home oxygen, BPH who presents as a transfer from [facility] for evaluation for possible unstable angina. Patient initially went into the hospital with worsening shortness of breath and what he describes as burning discomfort in his chest.  He denies any pressure on his chest. He does have a history of CABG in 2007 but has not had a catheterization since. Patient notes fatigue as well as some belching associated with this. At the outside hospital he was also being treated for an acute  asthma exacerbation is and was being followed by pulmonology.   Patient evaluated by Cardiology at outside hospital and recommend transfer to [facility] [place] for evaluation for possible left heart catheterization.    Unstable angina  CAD s/p CABG '07  - monitor for signs of ACS  - tele monitoring  - troponin 0.8, flat  - continue aspirin, statin  - ACS protocol was not recommended by cardiology  - consult Interventional Cardiology in for [facility]  - TTE  - nitro p.r.n.    Transferred initially for evaluation of unstable angina until becoming symptomatic with R side weakness and dysarthria. Patient was admitted to Hospital Medicine 3/22 for evaluation of unstable angina. A sroke code was called (3/24) in response to new onset R weakness and dysarthria. MRI was completed noting Acute L PCA infarct. Per Vascular Neuro staff, the symptoms improved while supine during imaging. The patient was subsequently transferred to [facility] for further management.     3/25-IR Consult                            3/22-H&P                          3/23-Hospital Med PN                      3/24-[facility] H&P     Please clarify if the _____NSTEMI_______ diagnosis has been:    [  ] Ruled In   [x  ] Ruled In, Now Resolved   [  ] Ruled Out   [  ] Other/Clarification of findings (please specify):     [  ] Clinically undetermined     Please document in your progress notes daily for the duration of treatment, until resolved, and include in your discharge summary.

## 2019-04-02 NOTE — PHYSICIAN QUERY
PT Name: Micheal Hunt  MR #: [id number]    Physician Query Form - CardioPulmonary Clarification      CDS: Alisson Covington RN, CCDS       Contact information: [email or web address]    This form is a permanent document in the medical record.    Query Date: April 2, 2019    By submitting this query, we are merely seeking further clarification of documentation. Please utilize your independent clinical judgment when addressing the question(s) below.    The Medical record contains the following:   Indicators   Supporting Clinical Findings Location in Medical Record   X Pulmonary Hypertension documented Pulmonary HTN  - home was following at prior hospital, consider bone consult in a.m.  - O2 as needed   3/22-H&P   X Acute/Chronic Illness Hx of ESRD on HD, hypertension, hyperlipidemia, CAD s/p CABG in 2007, paroxysmal AFib, chronic diastolic heart failure, DM 2, asthma not on home oxygen, BPH who presents as a transfer from [facility] for evaluation for possible unstable angina.   3/22-H&P   X Echo and/or Heart Cath Findings · Eccentric left ventricular hypertrophy.Normal left ventricular systolic function. The estimated ejection fraction is 55%  · Severe left atrial enlargement.  · Indeterminate left ventricular diastolic function.  · Moderate aortic valve stenosis. Aortic valve area is 1.37 cm2; peak velocity is 2.78 m/s; mean gradient is 22.39 mmHg. Mild aortic regurgitation.  · Mild-to-moderate mitral regurgitation.  · Mild to moderate tricuspid regurgitation.  · The estimated PA systolic pressure is 41 mm Hg  · Severe right atrial enlargement.  · Normal right ventricular systolic function. The right ventricle is mildly dilated.  · Patent foramen ovale present with left to right shunting indicated by color flow Doppler.  · Normal central venous pressure (3 mm Hg).  · Pulmonary hypertension present.      3/23-ECHO    BiPAP/Intubation/Supplemental O2     X SOB, ARMAS, Fatigue, Dizziness, LE Edema,  Cyanosis, Chest Pain, Respiratory Distress, Hypoxia, etc. Patient initially went into the hospital with worsening shortness of breath and what he describes as burning discomfort in his chest.    At the outside hospital he was also being treated for an acute asthma exacerbation is and was being followed by pulmonology. Patient notes chronic cough is that is dry.   3/22-H&P    Treatment         Medication      Other     Provider, please specify the type of pulmonary hypertension:  [   ] Group 1:  Pulmonary Arterial Hypertension - includes Primary, Idiopathic, Inheritable, and Secondary (due to drugs, toxins, congenital heart diease, HIV infection, etc.)     [ x  ] Group 2:  Pulmonary Hypertension due to Left Heart Disease, including left heart failure and/or left heart valve disease     [   ] Group 3:  Pulmonary Hypertension due to Lung Disease     [   ] Group 4:  Pulmonary Hypertension due to Obstruction of the Pulmonary Vessels caused by Chronic Thromboemboli, Tumor, or Foreign Bodies     [   ] Group 5:  Pulmonary Hypertension due to other, multifactorial, or unclear mechanisms     [   ] Pulmonary Hypertension, unspecified     [   ] Other Cardiopulmonary Condition (please specify):     [  ] Clinically Undetermined         Please document in your progress notes daily for the duration of treatment, until resolved, and include in your discharge summary.

## 2019-04-02 NOTE — HPI
Patient is a 78 y.o. male with history of HTN, HLD, GRICELDA, CAD (s/p CABG 2007), CKD, CHFpEF, and recent admission for PNA was transferred to [facility] from [place] for unstable angina.  Prior to heart cath, patient had a stroke.  He has right sided hemiparesis.  He has an umbilical hernia that has been present for a long time.  It does not bother him.  He is tolerating diet and having bowel function.  CT abdomen/pelvis unremarkable.

## 2019-04-02 NOTE — ASSESSMENT & PLAN NOTE
79 y/o M with multiple co morbidities (A.fib, HTN, CHF, ESRD, DM) now with L PCA syndrome. Fluctuating neurological examination with RUE weakness, dysarthria and aphasia.  New stroke seen on repeat imaging.  He has significant intracranial/extrancranial atherosclerotic disease seen on CTA head/neck. Etiology cardioembolic versus atheroembolic.      - Anticoagulants: Coumadin daily adjusting for INR goal of 2.0-3.0   - Statins: Atorvastatin- 80 mg daily  -Diagnostic studies pending: INR and cultures pending   - Aggressive risk factor modification: HTN, DM, HLD, A-Fib, CAD, intracranial atherosclerosis   - Rehab efforts: PT/OT to evaluate and treat ->recommending Rehab, SLP -> recommending Regular diet/nectar thick  - VTE prophylaxis: anticoagulated  - BP parameters: target sBP<160

## 2019-04-02 NOTE — ASSESSMENT & PLAN NOTE
Risk factor for stroke, primary team goal SBP <160  Cont Torsemide 20 mg q12, losartan 100 mg qd, and Carvedilol 25 mg q12

## 2019-04-02 NOTE — ASSESSMENT & PLAN NOTE
79 y/o M with multiple co morbidities (A.fib, HTN, CHF, ESRD, DM) now with L PCA syndrome. Fluctuating neurological examination with RUE weakness, dysarthria and aphasia.  New stroke seen on repeat imaging.  He has significant intracranial/extrancranial atherosclerotic disease seen on CTA head/neck. Etiology cardioembolic versus atheroembolic.      - Anticoagulants: Coumadin daily adjusting for INR goal of 2.0-3.0   - Statins: Atorvastatin- 80 mg daily  -Diagnostic studies pending: INR and arterial ultrasound   - Aggressive risk factor modification: HTN, DM, HLD, A-Fib, CAD, intracranial atherosclerosis   - Rehab efforts: PT/OT to evaluate and treat ->recommending Rehab, SLP -> recommending Regular diet/nectar thick  - VTE prophylaxis: anticoagulated  - BP parameters: target sBP<160

## 2019-04-02 NOTE — SUBJECTIVE & OBJECTIVE
Past Medical History:   Diagnosis Date    NICK (acute kidney injury) 7/29/2016    Allergy     Anemia, mild 12/15/2014    Arthritis     Gout    Benign essential HTN 3/27/2012    BMI 29.0-29.9,adult 5/10/2018    BPH (benign prostatic hyperplasia)     BPH (benign prostatic hyperplasia)     CAD (coronary artery disease) 2006    Chronic kidney disease     due to ibuprofen    Colon polyp     CRF (chronic renal failure), stage 5     Diverticulosis     Gastritis     GERD (gastroesophageal reflux disease)     Gout     History of colon polyps 5/3/2018    HTN (hypertension) 3/27/2012    Hyperlipidemia     Hyperlipidemia     LLL pneumonia 6/14/2018    LVH (left ventricular hypertrophy)     Mesenteric ischemia     Murmur, cardiac 3/27/2012    GRICELDA (obstructive sleep apnea)     DOES NOT USE A MACHINE    Sinus problem     Syncope and collapse      Past Surgical History:   Procedure Laterality Date    APPENDECTOMY      BLOCK-NERVE Left 5/31/2016    [facility] at [facility]    CARDIAC CATHETERIZATION      COLONOSCOPY  2011    COLONOSCOPY N/A 5/3/2018    [facility]    COLONOSCOPY N/A 9/10/2015    [facility]    CORONARY ARTERY BYPASS GRAFT  4/2007    x 1    CYSTOSCOPY N/A 8/30/2017    [facility] at [facility]    CYSTOSCOPY N/A 11/10/2015    [facility]    ESOPHAGOGASTRODUODENOSCOPY (EGD) N/A 1/4/2016    [facility] (2ND F[facility])    ESOPHAGOGASTRODUODENOSCOPY (EGD) N/A 10/15/2014    [facility]    INJECTION-STEROID-EPIDURAL-TRANSFORAMINAL Left 2/25/2016    [facility] at [facility] OR    JOINT REPLACEMENT      left knee total replacement  X 3    mid leftt finger      from a cactuss    MOLE REMOVAL  2016    RADIOFREQUENCY THERMOCOAGULATION (RFTC)-NERVE-MEDIAN BRANCH-LUMBAR Left  4/11/2016    [facility]    rotative cuff      no rotative cuffs on bilat shoulders has pins     SHOULDER SURGERY      shoulder surgery bilat  RIGHT X 4; LEFT X 3    TRANSRECTAL ULTRASOUND GUIDED PROSTATE BIOPSY Bilateral 11/10/2015    [facility] at [facility] OR    ULTRASOUND-ENDOSCOPIC-UPPER N/A 5/31/2017    [facility] at [facility] ENDO (2ND FLR)    ULTRASOUND-ENDOSCOPIC-UPPER N/A 1/4/2016    Performed by Tra [facility] at [facility] ENDO (2ND FLR)    ULTRASOUND-ENDOSCOPIC-UPPER N/A 1/16/2015    Performed by Jason Saleem MD at [facility] ENDO (2ND FLR)     Review of patient's allergies indicates:   Allergen Reactions    Ace inhibitors Other (See Comments)     Cough    Arb-angiotensin receptor antagonist Itching    Eplerenone Other (See Comments)     Marked bradycardia, 40, tiredness and weakness      Sulfa (sulfonamide antibiotics) Itching     Patient says this was 10 years ago and doesn't remember what happened       Scheduled Medications:    sodium chloride 0.9%   Intravenous Once    allopurinol  100 mg Oral Daily    atorvastatin  80 mg Oral Daily    carvedilol  25 mg Oral BID WM    clindamycin  300 mg Oral Q6H    epoetin kitty (PROCRIT) injection  10,000 Units Intravenous Every Mon, Wed, Fri    finasteride  5 mg Oral Daily    gabapentin  300 mg Oral QHS    insulin detemir U-100  8 Units Subcutaneous Daily    losartan  100 mg Oral QHS    pantoprazole  40 mg Oral Daily    polyethylene glycol  17 g Oral Daily    senna-docusate 8.6-50 mg  1 tablet Oral BID    tamsulosin  1 capsule Oral Daily    torsemide  20 mg Oral BID    warfarin  7.5 mg Oral Daily       PRN Medications: sodium chloride, sodium chloride 0.9%, acetaminophen, albuterol-ipratropium, dextrose 50%, dextrose 50%, glucagon (human recombinant), glucose, glucose, insulin aspart U-100, nitroGLYCERIN, ondansetron    Family History     Problem Relation (Age of Onset)    Cancer  Father    Heart disease Mother, Sister    Hypertension Brother    Pneumonia Sister    Stroke Sister    Sudden death Father        Tobacco Use    Smoking status: Never Smoker    Smokeless tobacco: Never Used   Substance and Sexual Activity    Alcohol use: No    Drug use: No    Sexual activity: Not on file     Review of Systems   Constitutional: Positive for activity change and fatigue. Negative for chills and fever.   HENT: Positive for trouble swallowing and voice change. Negative for drooling and hearing loss.    Eyes: Positive for visual disturbance. Negative for pain.   Respiratory: Negative for cough, shortness of breath and wheezing.    Cardiovascular: Negative for chest pain and palpitations.   Gastrointestinal: Negative for abdominal pain, nausea and vomiting.   Genitourinary: Positive for difficulty urinating. Negative for flank pain.   Musculoskeletal: Positive for arthralgias, gait problem and myalgias. Negative for back pain and neck pain.   Skin: Positive for wound. Negative for rash.   Neurological: Positive for speech difficulty and weakness. Negative for dizziness and headaches.   Psychiatric/Behavioral: Negative for agitation and hallucinations. The patient is not nervous/anxious.      Objective:     Vital Signs (Most Recent):  Temp: 98.2 °F (36.8 °C) (04/02/19 0805)  Pulse: 86 (04/02/19 0805)  Resp: 18 (04/02/19 0805)  BP: (!) 155/76 (04/02/19 0805)  SpO2: 98 % (04/02/19 0805)    Vital Signs (24h Range):  Temp:  [97.3 °F (36.3 °C)-100.6 °F (38.1 °C)] 98.2 °F (36.8 °C)  Pulse:  [63-90] 86  Resp:  [18] 18  SpO2:  [95 %-100 %] 98 %  BP: ()/(47-83) 155/76     Body mass index is 31.01 kg/m².    Physical Exam   Constitutional: He appears well-developed and well-nourished. No distress.   HENT:   Head: Normocephalic and atraumatic.   Right Ear: External ear normal.   Left Ear: External ear normal.   Nose: Nose normal.   + dysphonia   Eyes: Right eye exhibits no discharge. Left eye exhibits no  discharge. No scleral icterus.   Neck: Normal range of motion.   Cardiovascular: Normal rate, regular rhythm and intact distal pulses.   Pulmonary/Chest: Effort normal. No respiratory distress. He has no wheezes.   Abdominal: Soft. He exhibits no distension. There is no tenderness.   Musculoskeletal: Normal range of motion. He exhibits no edema.        Right forearm: He exhibits tenderness and swelling.   Neurological: He is alert. No sensory deficit. He exhibits normal muscle tone.   -  Mental Status:  Awake and alert.  Follows commands.  R neglect.    -  Speech and language:  + aphasia and dysarthria.    -  Vision:  R hemianopsia.   -  Facial movement (CN VII): symmetrical.   -  Motor:  RUE: 1/5.  LUE: 5/5.  RLE: 2/5.  LLE: 5/5.   Skin: Skin is warm and dry. No rash noted.   Psychiatric: He has a normal mood and affect. His behavior is normal.   Vitals reviewed.         Diagnostic Results:   Labs: Reviewed  X-Ray: Reviewed  CT: Reviewed  MRI: Reviewed

## 2019-04-02 NOTE — PLAN OF CARE
Problem: SLP Goal  Goal: SLP Goal  Goals to be met 4/1  1 pt will tolerate regular diet and thin liquids/met  2 pt will participate in speech lang eval/met  3.  Romney to time and place  4.  Respond to categorization tasks with 80% accuracy  5.  Assess functional reading and writing skills  6.  Respond to problem solving tasks with 80% accuracy      Outcome: Ongoing (interventions implemented as appropriate)  Pt progressing towards goals.   Ayana Borja MA/RIMA-SLP  Speech Language Pathologist  Pager [phone]  4/2/2019

## 2019-04-02 NOTE — ASSESSMENT & PLAN NOTE
-  3/24/19 developed right-sided weakness, facial droop, and dysarthria  -  CTA stroke multiphase showed significant intracranial and extracranial atherosclerotic disease  -  MRI brain revealed L thalamic infarct--> repeat MRI brain on 3/26 showed new acute L PCA territory infarctions  -  Management per Vascular Neurology--> etiology cardioembolic vs atheroembolic   See hospital course for functional, cognitive/speech/language, and nutrition/swallow status.      Recommendations  -  Encourage mobility, OOB in chair, and early ambulation as appropriate   -  PT/OT evaluate and treat  -  SLP speech and cognitive evaluate and treat  -  Monitor mood and sleep disturbances  -  Establish consistent sleep-wake cycle  -  Monitor for bowel and bladder dysfunction  -  Monitor for shoulder pain and subluxation  -  Monitor for spasticity  -  DVT prophylaxis  -  Monitor for and prevent skin breakdown and pressure ulcers  · Early mobility, repositioning/weight shifting every 20-30 minutes when sitting, turn patient every 2 hours, proper mattress/overlay and chair cushioning, pressure relief/heel protector boots

## 2019-04-02 NOTE — SUBJECTIVE & OBJECTIVE
Neurologic Chief Complaint: RSW + difficulty with speech    Subjective:     Interval History: hospital medicine consulted for multiple medical issues. Right arm edema worsening. Switching abx from oral to IV     HPI, Past Medical, Family, and Social History remains the same as documented in the initial encounter.     Review of Systems   Constitutional: Negative for diaphoresis and fever.   HENT: Negative for drooling, trouble swallowing and voice change.    Eyes: Positive for visual disturbance.   Respiratory: Negative for choking and shortness of breath.    Cardiovascular: Negative for palpitations.   Gastrointestinal: Negative for abdominal pain, nausea and vomiting.   Endocrine: Negative.    Skin: Negative.    Allergic/Immunologic: Negative.    Neurological: Positive for facial asymmetry, speech difficulty and weakness. Negative for seizures and light-headedness.   Psychiatric/Behavioral: Positive for confusion. Negative for agitation.     Scheduled Meds:   sodium chloride 0.9%   Intravenous Once    allopurinol  100 mg Oral Daily    atorvastatin  80 mg Oral Daily    carvedilol  25 mg Oral BID WM    ceFEPime (MAXIPIME) IVPB  1 g Intravenous Daily    epoetin kitty (PROCRIT) injection  10,000 Units Intravenous Every Mon, Wed, Fri    finasteride  5 mg Oral Daily    gabapentin  300 mg Oral QHS    insulin detemir U-100  8 Units Subcutaneous Daily    losartan  100 mg Oral QHS    pantoprazole  40 mg Oral Daily    polyethylene glycol  17 g Oral Daily    senna-docusate 8.6-50 mg  1 tablet Oral BID    tamsulosin  1 capsule Oral Daily    torsemide  20 mg Oral BID    vancomycin (VANCOCIN) IVPB  1,250 mg Intravenous Once    warfarin  5 mg Oral Daily     Continuous Infusions:    PRN Meds:sodium chloride, sodium chloride 0.9%, acetaminophen, albuterol-ipratropium, dextrose 50%, dextrose 50%, glucagon (human recombinant), glucose, glucose, insulin aspart U-100, nitroGLYCERIN, ondansetron    Objective:     Vital  Signs (Most Recent):  Temp: 98.2 °F (36.8 °C) (04/02/19 0805)  Pulse: 68 (04/02/19 1100)  Resp: 18 (04/02/19 0805)  BP: (!) 155/76 (04/02/19 0805)  SpO2: 98 % (04/02/19 0805)  BP Location: Right arm    Vital Signs Range (Last 24H):  Temp:  [97.3 °F (36.3 °C)-100.6 °F (38.1 °C)]   Pulse:  [63-87]   Resp:  [18]   BP: ()/(52-76)   SpO2:  [95 %-100 %]   BP Location: Right arm    Physical Exam   Constitutional: He appears well-developed and well-nourished. He is cooperative. He appears ill. No distress.   HENT:   Head: Normocephalic and atraumatic.   Eyes: Pupils are equal, round, and reactive to light.   Neck: Normal range of motion.   Cardiovascular: Normal rate. An irregularly irregular rhythm present.   Pulmonary/Chest: Effort normal. No accessory muscle usage. No respiratory distress.   Genitourinary:   Genitourinary Comments: Hematoma in the R groin, extending into scrotum and pelvic region      Musculoskeletal: He exhibits no edema.   Worsening edema in right upper extremity   Neurological: He displays no tremor. He displays no seizure activity.   Skin: Skin is warm. He is not diaphoretic.   Psychiatric: His behavior is normal.   Nursing note and vitals reviewed.    Neurological Exam:   LOC: alert and follows commands  Language and articulation: notable aphasia, expressive>>>receptive dysarthria    Visual Fields: no blink to threat right    Pupils (CN II, III): PERRL  Motor: antigravity in RUE, limited due to pain. 3/5, 3/5 RLE  Left side 5/5     Laboratory:  Recent Results (from the past 24 hour(s))   Ultrasound doppler arterial leg right (Cupid Only)    Collection Time: 04/01/19  5:09 PM   Result Value Ref Range    Right popliteal PSV 40 cm/s    Right CFA  cm/s    Right profunda sys  cm/s    Right super femoral prox sys  cm/s    Right super femoral mid sys  cm/s    Right super femoral dist sys PSV 48 cm/s    Right ant tibeal sys PSV 96 cm/s    Right tib/per trunk sys PSV 47 cm/s     Right post tibial sys PSV 41 cm/s    Right peroneal sys PSV 39 cm/s    Right ERIC 1.57     Right External Illiac PSV 64 cm/s    Right super femoral ostial sys  cm/s   POCT glucose    Collection Time: 04/01/19 10:38 PM   Result Value Ref Range    POCT Glucose 224 (H) 70 - 110 mg/dL   Urinalysis, Reflex to Urine Culture Urine, Catheterized    Collection Time: 04/02/19  7:39 AM   Result Value Ref Range    Specimen UA Urine, Catheterized     Color, UA Yellow Yellow, Straw, Katty    Appearance, UA Clear Clear    pH, UA >8.0 (A) 5.0 - 8.0    Specific Gravity, UA 1.010 1.005 - 1.030    Protein, UA 2+ (A) Negative    Glucose, UA Negative Negative    Ketones, UA Negative Negative    Bilirubin (UA) Negative Negative    Occult Blood UA 1+ (A) Negative    Nitrite, UA Negative Negative    Leukocytes, UA Negative Negative   Urinalysis Microscopic    Collection Time: 04/02/19  7:39 AM   Result Value Ref Range    RBC, UA 3 0 - 4 /hpf    WBC, UA 2 0 - 5 /hpf    Bacteria, UA Occasional None-Occ /hpf    Hyaline Casts, UA 0 0-1/lpf /lpf    Microscopic Comment SEE COMMENT    Comprehensive metabolic panel    Collection Time: 04/02/19 10:23 AM   Result Value Ref Range    Sodium 141 136 - 145 mmol/L    Potassium 4.2 3.5 - 5.1 mmol/L    Chloride 107 95 - 110 mmol/L    CO2 24 23 - 29 mmol/L    Glucose 147 (H) 70 - 110 mg/dL    BUN, Bld 37 (H) 8 - 23 mg/dL    Creatinine 4.8 (H) 0.5 - 1.4 mg/dL    Calcium 8.1 (L) 8.7 - 10.5 mg/dL    Total Protein 5.4 (L) 6.0 - 8.4 g/dL    Albumin 2.8 (L) 3.5 - 5.2 g/dL    Total Bilirubin 0.8 0.1 - 1.0 mg/dL    Alkaline Phosphatase 93 55 - 135 U/L    AST 21 10 - 40 U/L    ALT 29 10 - 44 U/L    Anion Gap 10 8 - 16 mmol/L    eGFR if African American 12.4 (A) >60 mL/min/1.73 m^2    eGFR if non African American 10.7 (A) >60 mL/min/1.73 m^2   Protime-INR    Collection Time: 04/02/19 10:23 AM   Result Value Ref Range    Prothrombin Time 18.2 (H) 9.0 - 12.5 sec    INR 1.9 (H) 0.8 - 1.2   Protime-INR     Collection Time: 04/02/19 10:23 AM   Result Value Ref Range    Prothrombin Time 18.2 (H) 9.0 - 12.5 sec    INR 1.9 (H) 0.8 - 1.2   CBC auto differential    Collection Time: 04/02/19 10:23 AM   Result Value Ref Range    WBC 7.19 3.90 - 12.70 K/uL    RBC 2.71 (L) 4.60 - 6.20 M/uL    Hemoglobin 8.6 (L) 14.0 - 18.0 g/dL    Hematocrit 25.6 (L) 40.0 - 54.0 %    MCV 95 82 - 98 fL    MCH 31.7 (H) 27.0 - 31.0 pg    MCHC 33.6 32.0 - 36.0 g/dL    RDW 15.6 (H) 11.5 - 14.5 %    Platelets 98 (L) 150 - 350 K/uL    MPV 9.6 9.2 - 12.9 fL    Immature Granulocytes 1.5 (H) 0.0 - 0.5 %    Gran # (ANC) 5.2 1.8 - 7.7 K/uL    Immature Grans (Abs) 0.11 (H) 0.00 - 0.04 K/uL    Lymph # 1.2 1.0 - 4.8 K/uL    Mono # 0.5 0.3 - 1.0 K/uL    Eos # 0.1 0.0 - 0.5 K/uL    Baso # 0.02 0.00 - 0.20 K/uL    nRBC 1 (A) 0 /100 WBC    Gran% 72.8 38.0 - 73.0 %    Lymph% 17.2 (L) 18.0 - 48.0 %    Mono% 6.8 4.0 - 15.0 %    Eosinophil% 1.4 0.0 - 8.0 %    Basophil% 0.3 0.0 - 1.9 %    Differential Method Automated    POCT glucose    Collection Time: 04/02/19 12:57 PM   Result Value Ref Range    POCT Glucose 144 (H) 70 - 110 mg/dL       Diagnostic Results     Brain Imaging   CTH non-contrast (3/28/2019):   Evolving subacute to chronic appearing infarcts in L thalamus and L PCA territory  small remote lacune in R putamen.  Cytotoxic cerebral edema        MRI Brain (3/26/19):  New acute left PCA territory distribution infarctions involving L thalamus and L paramedian occipital lobe, as well as L splenium of the corpus callosum   Generalized cerebral volume loss   significant chronic microvascular ischemic disease.      MRI Brain (3/24/19):  Vascular findings similar to CTH and CTA    Vessel Imaging   CTA-MP (3/24/19):  Extensive intracranial atherosclerotic disease:  Left PCA with a high-grade stenosis. High-grade (>70%) stenosis right proximal ICA and moderate (50-70%) stenosis left proximal ICA. Moderate to high-grade stenosis of the intra cavernous/ supraclinoid  ICAs bilaterally. High-grade stenosis distal left vertebral artery.    Cardiac Imaging   TTE (3/23/19):  EF 55%, LVH  severe bilateral atrial enlargement   PA pressure  41   PFO with left to right shunting

## 2019-04-02 NOTE — PROGRESS NOTES
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 Fluctuating neurological examination with RUE weakness, dysarthria and aphasia.  New stroke seen on repeat imaging.  He has significant intracranial/extrancranial atherosclerotic disease seen on CTA head/neck. Etiology cardioembolic versus atheroembolic.      - Anticoagulants: Coumadin daily adjusting for INR goal of 2.0-3.0   - Statins: Atorvastatin- 80 mg daily  -Diagnostic studies pending: INR, blood cultures   - Aggressive risk factor modification: HTN, DM, HLD, A-Fib, CAD, intracranial atherosclerosis   - Rehab efforts: PT/OT to evaluate and treat ->recommending Rehab, SLP -> recommending Regular diet/nectar thick  - VTE prophylaxis: anticoagulated  - BP parameters: target sBP<160      Cellulitis of right arm  R forearm edema   Arterial line removed, evaluated by ortho for compartment syndrome which was negative as well as US  Stated on Clindamycin 300 q6 for possible cellulitis in the Neuro ICU (3/30), will continue x5 days  04/02 Edema worsening d/c clindamycin started IV vancomycin and cefepime.  Vanc one dose given today and vanc level will be drawn one hour tomorrow before dialysis is completed.    Blood cultures pending   Defer to pharmacy after to decide what dosing to give.      Umbilical hernia  + abdominal pain during hospitalization  CT scan ordered showed umbilical hernia  General surgery consulted and recommended outpatient follow up     Cytotoxic cerebral edema  Large area of cytotoxic cerebral edema identified when reviewing brain imaging in the territory of the left posterior cerebral artery. There is mass effect associated with it. We will continue to monitor the patients clinical exam for any worsening of symptoms which may  indicate expansion of the stroke or the area of the edema resulting in the clinical change. The pattern is suggestive of atheroembolic etiology.        Hemoglobin drop  Within the last 3 days from 8.2 >7.9 >7.1    - Discontinued heparin gtt  - will transfuse with pRBC x1 (consent obtained from daughter Tonya Hunt via phone)  - H/h stable     Groin hematoma  Monitoring daily,continue to worsen  H/h decreasing Discontinued Heparin gtt  Stat right femoral arterial ultrasound ordered and negative for vasculature abnormalities     Dysarthria due to acute cerebrovascular accident (CVA)  - recommend patient be re-evaluated by SLP  - recommend NPO for diet     Patent foramen ovale  Risk factor for stroke  Seen on echocardiogram     Paroxysmal atrial fibrillation  Risk factor for stroke   CHADsVASc 7    - Carvedilol for rate control   - Heparin gtt discontinued due to drop in Hgb (8.2 > 7.7 > 7.1)  - coumadin 5mg      ESRD (end stage renal disease) on dialysis  Patient is being followed by nephrology, appreciate their assistance  Continue scheduled HD, MWF        Type 2 diabetes mellitus, without long-term current use of insulin  Poorly controlled, A1C 8.3  Current glucose running ~100-150  Detemir 8 units daily  MD SSI  POCt glucose q6 hr    Long term (current) use of anticoagulants  - coumadin daily    Benign prostatic hyperplasia without lower urinary tract symptoms  H/o and CT showed enlarged prostate   - continue flomax and finasteride  -has been making urine and several episodes of incontinence per sister in law but has not urinated since yesterday  -u/a negative, bladder scan pending    Anemia of chronic disease  Due to ESRD   receives EPO every 2 weeks   H/h continues to trend down.  Blood transfusion 03/31 1unit        Gout, arthritis  - continue allopurinol    CAD (coronary artery disease), 2V CABG 2007  Risk factor for stroke  Continue statin.   On coumadin daily         Hyperlipidemia  LDL 46  Continue  atorvastatin 80 mg due to extensive intracranial atherosclerotic disease      Essential hypertension  Risk factor for stroke   SBP<160  continue Torsemide 20 mg q12, losartan 100 mg qd, and Carvedilol 25 mg q12        Carotid artery stenosis  Noted CTA and carotid US  Patient evaluated by vascular surgery, recommended maximal medical management         Admitted to hospital medicine for unstable angina eval, vascular neurology consulted for right sided weakness, facial droop and dysarthria, MRI showed L PCA infarction, not candidate for TPA ( symptoms duration >4 hours)patient symptoms improved with laying flat, admitted to neuro critical care. Patient with known PCA infarct on L with undulating symptom pattern with modification to level of recumbency. Patient restarted on heparin gtt. Underwent US of LE and of carotid with evidence of <50% L ICA stenosis and 60-70% R ICA stenosis, heavily calcified on CTA, additionally L vertebral also heavily calcified. Patient tolerated HD. Neuro deficit appear to be more severe on 3/27 with decreasing right arm strength now 2/5 and with worsening dysarthria/aphasia. Patient with gross swelling of R forearm - tense, pulses intact.   03/29/2019 patient with worsening of neurological deficit, unable to move right upper extremity, patient with pain on passive movement of right fingers exhibited by facial grimace as verbal response to pain/sensation/light touch no longer reliable, aphasia/dysarthria worsening, RLE with 2/5 strength today, patient evaluated by orthopedic surgery regarding R forearm yesterday and today and feel that this is not compartment syndrome and will continue to monitor, distal pulses remain palpable  3/30: NAEON. On heparin gtt. Plans to step down  3/31: stepped down to VN without major events. transfused with pRBC x1 due to drop in Hbg. Discontinued heparin gtt. INR increased to 1.2, increased warfarin to 7.5  04/01/19 Visited patient in dialysis today.  On  coumadin and atorvastatin for secondary stroke prevention H/H stabilized at 7.6 after 1 unit pRBC transfusion on 03/31.  Epo given during dialysis.  Patient has worsening right groin hematoma.  Stat right femoral artery u/s ordered.  On clindamycin for suspected right arm cellulitis.  Sister-in-law concern for urination.  Patient was incontinent and making urine but today has not had any urination.  U/A and bladder scan ordered   04/02/19 hospital medicine consulted for multiple medical issues. Right arm edema worsening.  Switching antibiotics from oral to IV         STROKE DOCUMENTATION        NIH Scale:  1a. Level of Consciousness: 0-->Alert, keenly responsive  1b. LOC Questions: 2-->Answers neither question correctly  1c. LOC Commands: 0-->Performs both tasks correctly  2. Best Gaze: 0-->Normal  3. Visual: 2-->Complete hemianopia  4. Facial Palsy: 0-->Normal symmetrical movements  5a. Motor Arm, Left: 0-->No drift, limb holds 90 (or 45) degrees for full 10 secs  5b. Motor Arm, Right: 3-->No effort against gravity, limb falls  6a. Motor Leg, Left: 0-->No drift, leg holds 30 degree position for full 5 secs  6b. Motor Leg, Right: 0-->No drift, leg holds 30 degree position for full 5 secs  7. Limb Ataxia: 0-->Absent  8. Sensory: 0-->Normal, no sensory loss  9. Best Language: 1-->Mild-to-moderate aphasia, some obvious loss of fluency or facility of comprehension, without significant limitation on ideas expressed or form of expression. Reduction of speech and/or comprehension, however, makes conversation. . . (see row details)  10. Dysarthria: 0-->Normal  11. Extinction and Inattention (formerly Neglect): 0-->No abnormality  Total (NIH Stroke Scale): 8       Modified Hyrum    Sachin Coma Scale:    ABCD2 Score:    [id number] Score:8  HAS -BLED Score:   ICH Score:   Hunt & Sharp Classification:      Hemorrhagic change of an Ischemic Stroke: Does this patient have an ischemic stroke with hemorrhagic changes? No      Neurologic Chief Complaint: RSW + difficulty with speech    Subjective:     Interval History: hospital medicine consulted for multiple medical issues. Right arm edema worsening. Switching abx from oral to IV     HPI, Past Medical, Family, and Social History remains the same as documented in the initial encounter.     Review of Systems   Constitutional: Negative for diaphoresis and fever.   HENT: Negative for drooling, trouble swallowing and voice change.    Eyes: Positive for visual disturbance.   Respiratory: Negative for choking and shortness of breath.    Cardiovascular: Negative for palpitations.   Gastrointestinal: Negative for abdominal pain, nausea and vomiting.   Endocrine: Negative.    Skin: Negative.    Allergic/Immunologic: Negative.    Neurological: Positive for facial asymmetry, speech difficulty and weakness. Negative for seizures and light-headedness.   Psychiatric/Behavioral: Positive for confusion. Negative for agitation.     Scheduled Meds:   sodium chloride 0.9%   Intravenous Once    allopurinol  100 mg Oral Daily    atorvastatin  80 mg Oral Daily    carvedilol  25 mg Oral BID WM    ceFEPime (MAXIPIME) IVPB  1 g Intravenous Daily    epoetin kitty (PROCRIT) injection  10,000 Units Intravenous Every Mon, Wed, Fri    finasteride  5 mg Oral Daily    gabapentin  300 mg Oral QHS    insulin detemir U-100  8 Units Subcutaneous Daily    losartan  100 mg Oral QHS    pantoprazole  40 mg Oral Daily    polyethylene glycol  17 g Oral Daily    senna-docusate 8.6-50 mg  1 tablet Oral BID    tamsulosin  1 capsule Oral Daily    torsemide  20 mg Oral BID    vancomycin (VANCOCIN) IVPB  1,250 mg Intravenous Once    warfarin  5 mg Oral Daily     Continuous Infusions:    PRN Meds:sodium chloride, sodium chloride 0.9%, acetaminophen, albuterol-ipratropium, dextrose 50%, dextrose 50%, glucagon (human recombinant), glucose, glucose, insulin aspart U-100, nitroGLYCERIN, ondansetron    Objective:      Vital Signs (Most Recent):  Temp: 98.2 °F (36.8 °C) (04/02/19 0805)  Pulse: 68 (04/02/19 1100)  Resp: 18 (04/02/19 0805)  BP: (!) 155/76 (04/02/19 0805)  SpO2: 98 % (04/02/19 0805)  BP Location: Right arm    Vital Signs Range (Last 24H):  Temp:  [97.3 °F (36.3 °C)-100.6 °F (38.1 °C)]   Pulse:  [63-87]   Resp:  [18]   BP: ()/(52-76)   SpO2:  [95 %-100 %]   BP Location: Right arm    Physical Exam   Constitutional: He appears well-developed and well-nourished. He is cooperative. He appears ill. No distress.   HENT:   Head: Normocephalic and atraumatic.   Eyes: Pupils are equal, round, and reactive to light.   Neck: Normal range of motion.   Cardiovascular: Normal rate. An irregularly irregular rhythm present.   Pulmonary/Chest: Effort normal. No accessory muscle usage. No respiratory distress.   Genitourinary:   Genitourinary Comments: Hematoma in the R groin, extending into scrotum and pelvic region      Musculoskeletal: He exhibits no edema.   Worsening edema in right upper extremity   Neurological: He displays no tremor. He displays no seizure activity.   Skin: Skin is warm. He is not diaphoretic.   Psychiatric: His behavior is normal.   Nursing note and vitals reviewed.    Neurological Exam:   LOC: alert and follows commands  Language and articulation: notable aphasia, expressive>>>receptive dysarthria    Visual Fields: no blink to threat right    Pupils (CN II, III): PERRL  Motor: antigravity in RUE, limited due to pain. 3/5, 3/5 RLE  Left side 5/5     Laboratory:  Recent Results (from the past 24 hour(s))   Ultrasound doppler arterial leg right (Cupid Only)    Collection Time: 04/01/19  5:09 PM   Result Value Ref Range    Right popliteal PSV 40 cm/s    Right CFA  cm/s    Right profunda sys  cm/s    Right super femoral prox sys  cm/s    Right super femoral mid sys  cm/s    Right super femoral dist sys PSV 48 cm/s    Right ant tibeal sys PSV 96 cm/s    Right tib/per trunk sys  PSV 47 cm/s    Right post tibial sys PSV 41 cm/s    Right peroneal sys PSV 39 cm/s    Right ERIC 1.57     Right External Illiac PSV 64 cm/s    Right super femoral ostial sys  cm/s   POCT glucose    Collection Time: 04/01/19 10:38 PM   Result Value Ref Range    POCT Glucose 224 (H) 70 - 110 mg/dL   Urinalysis, Reflex to Urine Culture Urine, Catheterized    Collection Time: 04/02/19  7:39 AM   Result Value Ref Range    Specimen UA Urine, Catheterized     Color, UA Yellow Yellow, Straw, Katty    Appearance, UA Clear Clear    pH, UA >8.0 (A) 5.0 - 8.0    Specific Gravity, UA 1.010 1.005 - 1.030    Protein, UA 2+ (A) Negative    Glucose, UA Negative Negative    Ketones, UA Negative Negative    Bilirubin (UA) Negative Negative    Occult Blood UA 1+ (A) Negative    Nitrite, UA Negative Negative    Leukocytes, UA Negative Negative   Urinalysis Microscopic    Collection Time: 04/02/19  7:39 AM   Result Value Ref Range    RBC, UA 3 0 - 4 /hpf    WBC, UA 2 0 - 5 /hpf    Bacteria, UA Occasional None-Occ /hpf    Hyaline Casts, UA 0 0-1/lpf /lpf    Microscopic Comment SEE COMMENT    Comprehensive metabolic panel    Collection Time: 04/02/19 10:23 AM   Result Value Ref Range    Sodium 141 136 - 145 mmol/L    Potassium 4.2 3.5 - 5.1 mmol/L    Chloride 107 95 - 110 mmol/L    CO2 24 23 - 29 mmol/L    Glucose 147 (H) 70 - 110 mg/dL    BUN, Bld 37 (H) 8 - 23 mg/dL    Creatinine 4.8 (H) 0.5 - 1.4 mg/dL    Calcium 8.1 (L) 8.7 - 10.5 mg/dL    Total Protein 5.4 (L) 6.0 - 8.4 g/dL    Albumin 2.8 (L) 3.5 - 5.2 g/dL    Total Bilirubin 0.8 0.1 - 1.0 mg/dL    Alkaline Phosphatase 93 55 - 135 U/L    AST 21 10 - 40 U/L    ALT 29 10 - 44 U/L    Anion Gap 10 8 - 16 mmol/L    eGFR if African American 12.4 (A) >60 mL/min/1.73 m^2    eGFR if non African American 10.7 (A) >60 mL/min/1.73 m^2   Protime-INR    Collection Time: 04/02/19 10:23 AM   Result Value Ref Range    Prothrombin Time 18.2 (H) 9.0 - 12.5 sec    INR 1.9 (H) 0.8 - 1.2    Protime-INR    Collection Time: 04/02/19 10:23 AM   Result Value Ref Range    Prothrombin Time 18.2 (H) 9.0 - 12.5 sec    INR 1.9 (H) 0.8 - 1.2   CBC auto differential    Collection Time: 04/02/19 10:23 AM   Result Value Ref Range    WBC 7.19 3.90 - 12.70 K/uL    RBC 2.71 (L) 4.60 - 6.20 M/uL    Hemoglobin 8.6 (L) 14.0 - 18.0 g/dL    Hematocrit 25.6 (L) 40.0 - 54.0 %    MCV 95 82 - 98 fL    MCH 31.7 (H) 27.0 - 31.0 pg    MCHC 33.6 32.0 - 36.0 g/dL    RDW 15.6 (H) 11.5 - 14.5 %    Platelets 98 (L) 150 - 350 K/uL    MPV 9.6 9.2 - 12.9 fL    Immature Granulocytes 1.5 (H) 0.0 - 0.5 %    Gran # (ANC) 5.2 1.8 - 7.7 K/uL    Immature Grans (Abs) 0.11 (H) 0.00 - 0.04 K/uL    Lymph # 1.2 1.0 - 4.8 K/uL    Mono # 0.5 0.3 - 1.0 K/uL    Eos # 0.1 0.0 - 0.5 K/uL    Baso # 0.02 0.00 - 0.20 K/uL    nRBC 1 (A) 0 /100 WBC    Gran% 72.8 38.0 - 73.0 %    Lymph% 17.2 (L) 18.0 - 48.0 %    Mono% 6.8 4.0 - 15.0 %    Eosinophil% 1.4 0.0 - 8.0 %    Basophil% 0.3 0.0 - 1.9 %    Differential Method Automated    POCT glucose    Collection Time: 04/02/19 12:57 PM   Result Value Ref Range    POCT Glucose 144 (H) 70 - 110 mg/dL       Diagnostic Results     Brain Imaging   CTH non-contrast (3/28/2019):   Evolving subacute to chronic appearing infarcts in L thalamus and L PCA territory  small remote lacune in R putamen.  Cytotoxic cerebral edema        MRI Brain (3/26/19):  New acute left PCA territory distribution infarctions involving L thalamus and L paramedian occipital lobe, as well as L splenium of the corpus callosum   Generalized cerebral volume loss   significant chronic microvascular ischemic disease.      MRI Brain (3/24/19):  Vascular findings similar to CTH and CTA    Vessel Imaging   CTA-MP (3/24/19):  Extensive intracranial atherosclerotic disease:  Left PCA with a high-grade stenosis. High-grade (>70%) stenosis right proximal ICA and moderate (50-70%) stenosis left proximal ICA. Moderate to high-grade stenosis of the intra  cavernous/ supraclinoid ICAs bilaterally. High-grade stenosis distal left vertebral artery.    Cardiac Imaging   TTE (3/23/19):  EF 55%, LVH  severe bilateral atrial enlargement   PA pressure  41   PFO with left to right shunting        Barb Arce PA-C  Comprehensive Stroke Center  Department of Vascular Neurology   [facility]

## 2019-04-02 NOTE — ASSESSMENT & PLAN NOTE
Monitoring daily,continue to worsen  H/h decreasing Discontinued Heparin gtt  Stat right femoral arterial ultrasound ordered and negative for vasculature abnormalities

## 2019-04-02 NOTE — PT/OT/SLP PROGRESS
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   Yes  Keep patient NPO? No   Current Respiratory Status: room air       Pt. Sleeping up on entry to room.  HOB was raised to 90 degree angle though pt.  Cont. To require cues to remain alert.  He refused po trials by turning his head away when presented.  Eyes were closed for most of session despite verbal and tactile cues.  Verbalizations elicited were mumbled and only elicited after multiple repetitions prompts. He did not respond to orientation questions or personal biographical information questions. Vocal quality and intensity were wfl.  Cont. poc         Assessment:     Micheal Hunt is a 78 y.o. male with an SLP diagnosis of Aphasia, Dysphagia and Cognitive-Linguistic Impairment.      Goals:   Multidisciplinary Problems     SLP Goals        Problem: SLP Goal    Goal Priority Disciplines Outcome   SLP Goal     SLP Ongoing (interventions implemented as appropriate)   Description:  Goals to be met 4/8  1 pt will tolerate regular diet and thin liquids/met  2 pt will participate in speech lang eval/met  3.  [place] to time and place  4.  Respond to categorization tasks with 80% accuracy  5.  Assess functional reading and writing skills  6.  Respond to problem solving " tasks with 80% accuracy                        Plan:     · Patient to be seen:  4 x/week   · Plan of Care expires:  04/24/19  · Plan of Care reviewed with:  patient   · SLP Follow-Up:  Yes       Discharge recommendations:  rehabilitation facility       Time Tracking:     SLP Treatment Date:   04/02/19  Speech Start Time:  0815  Speech Stop Time:  0825     Speech Total Time (min):  10 min    Billable Minutes: Speech Therapy Individual 10    Ayana Borja MA, CCC-SLP  04/02/2019

## 2019-04-02 NOTE — ASSESSMENT & PLAN NOTE
CT scan showing umbilical hernia, asymptomatic  Gen surgery consulted, no acute intervention, recommended outpatient follow up

## 2019-04-02 NOTE — PT/OT/SLP PROGRESS
Occupational Therapy      Patient Name:  Micheal Hunt   MRN:  [id number]    Patient not seen today secondary to Patient unwilling to participate. Writing therapist attempted pt in AM, but pt refused 2/2 fatigue. Pt educated on importance of oob activities and importance of participating in therapy. Pt still refused. Will follow-up as scheduled.    Hema Mehta OT  4/2/2019

## 2019-04-02 NOTE — CONSULTS
[facility] Hospital Medicine  Consult Note    Patient Name: Micheal Hunt  MRN: [id number]  Admission Date: 3/22/2019  Hospital Length of Stay: 11 days  Attending Physician: Tray Bazan MD   Primary Care Provider: Pk Lakhani MD     [facility] Medicine Team: Networked reference to record PCT  Marivel Casanova MD      Patient information was obtained from patient, relative(s) and past medical records.     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       HPI: Mr. Micheal Hunt, 78 y.o. male with history of HTN, HLD, GRICELDA, CAD (s/p CABG 2007), ESRD on HD MWF, CHFpEF, Afib on coumadin, DM2,and recent admission for PNA was transferred to [facility] from [place] for unstable angina and hrt cath.  Prior to heart cath, stroke code was called for right sided weakness, facial droop and dysarthria. MRI showed L PCA infarction, not candidate for TPA. Admitted to neuro critical care and started on heparin gtt. Underwent US of LE and of carotid with evidence of <50% L ICA stenosis and 60-70% R ICA stenosis, heavily calcified on CTA, additionally L vertebral also heavily calcified. Nephrology following for ESRD on HD. During hospitalization, pt noted to have gross swelling of R forearm - tense, pulses intact. Patient evaluated by orthopedic surgery regarding R forearm and feel that this is not compartment syndrome. Pt was started on clindamycin for suspected right arm cellulitis. On 3/31, pt was stepped down to vascular neurology. Pt transfused with pRBC x1 due to drop in Hbg. Discontinued heparin gtt. Pt also noted to have R groin hematoma, worsening. Stat right femoral artery u/s negative for any fluid collection, aneurysm, or stenosis. 4/2  Right arm edema worsening so switched antibiotics from oral clinda to IV vancomycin and cefepime. Hospital medicine consulted for multiple medical issues.        Past Medical History:   Diagnosis Date    NICK (acute kidney injury) 7/29/2016    Allergy     Anemia, mild 12/15/2014    Arthritis     Gout    Benign essential HTN 3/27/2012    BMI 29.0-29.9,adult 5/10/2018    BPH (benign prostatic hyperplasia)     BPH (benign prostatic hyperplasia)     CAD (coronary artery disease) 2006    Chronic kidney disease     due to ibuprofen    Colon polyp     CRF (chronic renal failure), stage 5     Diverticulosis     Gastritis     GERD (gastroesophageal reflux disease)     Gout     History of colon polyps 5/3/2018    HTN (hypertension) 3/27/2012    Hyperlipidemia     Hyperlipidemia     LLL pneumonia 6/14/2018    LVH (left ventricular hypertrophy)     Mesenteric ischemia     Murmur, cardiac 3/27/2012    GRICELDA (obstructive sleep apnea)     DOES NOT USE A MACHINE    Sinus problem     Syncope and collapse        Past Surgical History:   Procedure Laterality Date    APPENDECTOMY      BLOCK-NERVE Left 5/31/2016    [facility] OR    CARDIAC CATHETERIZATION      COLONOSCOPY  2011    COLONOSCOPY N/A 5/3/2018    [facility] ENDO    COLONOSCOPY N/A 9/10/2015    [facility] at [facility]O    CORONARY ARTERY BYPASS GRAFT  4/2007    x 1    CYSTOSCOPY N/A 8/30/2017    [facility] at [facility] OR    CYSTOSCOPY N/A 11/10/2015    [facility] at [facility] OR    ESOPHAGOGASTRODUODENOSCOPY (EGD) N/A 1/4/2016    [facility]formed by [facility] at [facility] ENDO (2ND FLR)    ESOPHAGOGASTRODUODENOSCOPY (EGD) N/A 10/15/2014    [facility] by [facility] at [facility] ENDO    INJECTION-STEROID-EPIDURAL-TRANSFORAMINAL Left 2/25/2016    [facility] at [facility] OR    JOINT REPLACEMENT      left knee total  replacement  X 3    mid leftt finger      from a cactuss    MOLE REMOVAL  2016    RADIOFREQUENCY THERMOCOAGULATION (RFTC)-NERVE-MEDIAN BRANCH-LUMBAR Left 4/11/2016    [facility] OR    rotative cuff      no rotative cuffs on bilat shoulders has pins     SHOULDER SURGERY      shoulder surgery bilat  RIGHT X 4; LEFT X 3    TRANSRECTAL ULTRASOUND GUIDED PROSTATE BIOPSY Bilateral 11/10/2015    [facility]    ULTRASOUND-ENDOSCOPIC-UPPER N/A 5/31/2017    Performed by [facility] at [facility] ENDO (2ND FLR)    ULTRASOUND-ENDOSCOPIC-UPPER N/A 1/4/2016    Performed by Tra [facility] at [facility] ENDO (2ND FLR)    ULTRASOUND-ENDOSCOPIC-UPPER N/A 1/16/2015    Performed by Jason Saleem MD at [facility] ENDO (2ND FLR)       Review of patient's allergies indicates:   Allergen Reactions    Ace inhibitors Other (See Comments)     Cough    Arb-angiotensin receptor antagonist Itching    Eplerenone Other (See Comments)     Marked bradycardia, 40, tiredness and weakness      Sulfa (sulfonamide antibiotics) Itching     Patient says this was 10 years ago and doesn't remember what happened       No current facility-administered medications on file prior to encounter.      Current Outpatient Medications on File Prior to Encounter   Medication Sig    allopurinol (ZYLOPRIM) 100 MG tablet Take 100 mg by mouth once daily.    amLODIPine (NORVASC) 10 MG tablet Take 10 mg by mouth once daily.    aspirin (ECOTRIN) 81 MG EC tablet Take 81 mg by mouth once daily.      atorvastatin (LIPITOR) 80 MG tablet TAKE 1 TABLET (80 MG TOTAL) BY MOUTH ONCE DAILY.    budesonide-formoterol 160-4.5 mcg (SYMBICORT) 160-4.5 mcg/actuation HFAA Inhale 2 puffs into the lungs every 12 (twelve) hours. Controller    carvedilol (COREG) 12.5 MG tablet Take 12.5 mg by mouth 2 (two) times daily with meals.    celecoxib (CELEBREX) 200 MG capsule Take 1 capsule (200 mg total) by mouth once daily.     finasteride (PROSCAR) 5 mg tablet TAKE 1 TABLET ONCE DAILY.    fluticasone (FLONASE) 50 mcg/actuation nasal spray 2 sprays (100 mcg total) by Each Nare route once daily.    gabapentin (NEURONTIN) 300 MG capsule Take 1 capsule (300 mg total) by mouth every evening.    isosorbide mononitrate (ISMO,MONOKET) 10 mg tablet TAKE 1 TABLET BY MOUTH EVERY DAY IN THE EVENING    meclizine (ANTIVERT) 25 mg tablet Take 1 tablet (25 mg total) by mouth 3 (three) times daily as needed for Dizziness.    mirtazapine (REMERON) 7.5 MG Tab TAKE 1 TABLET BY MOUTH EVERY EVENING.    montelukast (SINGULAIR) 10 mg tablet Take 10 mg by mouth every evening.    NITROSTAT 0.4 mg SL tablet PLACE 1 TABLET (0.4 MG TOTAL) UNDER THE TONGUE EVERY 5 (FIVE) MINUTES AS NEEDED FOR CHEST PAIN.    omeprazole (PRILOSEC) 20 MG capsule TAKE 1 CAPSULE BY MOUTH EVERY DAY    tamsulosin (FLOMAX) 0.4 mg Cap TAKE 1 CAPSULE BY MOUTH EVERY DAY    tiotropium bromide (SPIRIVA RESPIMAT) 1.25 mcg/actuation Mist Inhale 2.5 mcg into the lungs once daily. Controller    torsemide (DEMADEX) 20 MG Tab Take 1 tablet (20 mg total) by mouth 2 (two) times daily.    warfarin (COUMADIN) 7.5 MG tablet Take 1 tablet (7.5 mg total) by mouth Daily.    zolpidem (AMBIEN) 5 MG Tab TAKE 1 TABLET BY MOUTH EVERY EVENING AS NEEDED    albuterol (PROVENTIL/VENTOLIN HFA) 90 mcg/actuation inhaler 2 puffs every 4 hours as needed for cough, wheeze, or shortness of breath    albuterol-ipratropium (DUO-NEB) 2.5 mg-0.5 mg/3 mL nebulizer solution Take 3 mLs by nebulization every 6 (six) hours as needed for Wheezing or Shortness of Breath.     Family History     Problem Relation (Age of Onset)    Cancer Father    Heart disease Mother, Sister    Hypertension Brother    Pneumonia Sister    Stroke Sister    Sudden death Father        Tobacco Use    Smoking status: Never Smoker    Smokeless tobacco: Never Used   Substance and Sexual Activity    Alcohol use: No    Drug use: No    Sexual  activity: Not on file     Review of Systems   Constitutional: Negative for fatigue and fever.   HENT: Negative for congestion and rhinorrhea.    Eyes: Negative for pain and visual disturbance.   Respiratory: Negative for cough, shortness of breath and wheezing.    Cardiovascular: Negative for chest pain, palpitations and leg swelling.   Gastrointestinal: Negative for abdominal pain, nausea and vomiting.   Genitourinary: Negative for difficulty urinating, dysuria and hematuria.   Musculoskeletal: Negative for arthralgias and back pain.   Skin: Positive for color change. Negative for pallor.   Neurological: Positive for facial asymmetry, speech difficulty, weakness (R sided) and numbness. Negative for dizziness and headaches.   Hematological: Negative for adenopathy. Bruises/bleeds easily.   Psychiatric/Behavioral: Positive for confusion and decreased concentration. Negative for agitation. The patient is not nervous/anxious.      Objective:     Vital Signs (Most Recent):  Temp: 98.2 °F (36.8 °C) (04/02/19 0805)  Pulse: 68 (04/02/19 1100)  Resp: 18 (04/02/19 0805)  BP: (!) 155/76 (04/02/19 0805)  SpO2: 98 % (04/02/19 0805) Vital Signs (24h Range):  Temp:  [97.3 °F (36.3 °C)-100.6 °F (38.1 °C)] 98.2 °F (36.8 °C)  Pulse:  [63-87] 68  Resp:  [18] 18  SpO2:  [95 %-100 %] 98 %  BP: ()/(52-76) 155/76     Weight: 106.6 kg (235 lb 0.2 oz)  Body mass index is 31.01 kg/m².    Physical Exam   Constitutional: He appears well-developed and well-nourished. No distress.   HENT:   Head: Normocephalic and atraumatic.   Mouth/Throat: No oropharyngeal exudate.   Eyes: Pupils are equal, round, and reactive to light. EOM are normal. Right eye exhibits no discharge. Left eye exhibits no discharge.   Neck: Normal range of motion. Neck supple.   Cardiovascular: Normal rate and intact distal pulses.   No murmur heard.  Pulmonary/Chest: Effort normal and breath sounds normal. No stridor. No respiratory distress. He has no wheezes. He  exhibits no tenderness.   Abdominal: Soft. Bowel sounds are normal. There is no tenderness. There is no guarding.   Musculoskeletal: He exhibits no edema or tenderness.   Neurological: He is alert.   Follows commands. Mostly nods to yes or no questions. Aphasic, dysarthria   Skin: Skin is warm and dry. He is not diaphoretic. There is erythema.   R groin hematoma extending to scrotum/pelvis, RUE and R foot bruising. Mild erythema to R forearm near wrist with vesicle, no TTP, no drainage    Psychiatric: He has a normal mood and affect. His behavior is normal.   Vitals reviewed.      Significant Labs:   CBC:   Recent Labs   Lab 04/01/19  0040 04/01/19  0559 04/02/19  1023   WBC 7.14 7.01 7.19   HGB 7.6* 7.6* 8.6*   HCT 23.7* 22.9* 25.6*   PLT 92* 111* 98*     CMP:   Recent Labs   Lab 04/01/19  0559 04/02/19  1023    141   K 4.9 4.2   * 107   CO2 20* 24   GLU 93 147*   BUN 51* 37*   CREATININE 5.9* 4.8*   CALCIUM 7.7* 8.1*   PROT 4.9* 5.4*   ALBUMIN 2.6* 2.8*   BILITOT 0.8 0.8   ALKPHOS 76 93   AST 19 21   ALT 24 29   ANIONGAP 9 10   EGFRNONAA 8.3* 10.7*       Significant Imaging: I have reviewed all pertinent imaging results/findings within the past 24 hours.    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 No TTP  Hgb improved from yesterday (7.6 > 8.6)  LE U/S negative for any fluid collection, aneurysm, or stenosis    Plan:  Cont to monitor daily CBCs  Cont to monitor size          Cellulitis of right arm  R forearm edema, mild erythema  Arterial line removed, evaluated by ortho for compartment syndrome, negative  Started on clindamycin 300 mg q6hrs for possible  cellulitis in the NCC on 3/30  4/1 night with low grade fever T 100.6, hypotension x 1 though hypertensive otherwise  4/2 edema worsening, primary team d/c'd clindamycin and started IV vancomycin x 1 and cefepime. Random vanc level in the AM    Plan:   Agree with continuing abx for now, will continue to monitor and de-escalate appropriately  Ordered RUE U/S to r/o DVT  Ordered procal        Paroxysmal atrial fibrillation  Per primary team. Risk factor for stroke  CHADsVASc 7  Coreg for rate control, coumadin 5 mg daily per primary team  Goal INR 2-3, INR 1.9        ESRD (end stage renal disease) on dialysis  Nephrology following, HD MWF      Type 2 diabetes mellitus, without long-term current use of insulin  Hgb A1c 8.3  Per primary team, detemir 8 u qHS, MDSS, POCT glc q6hrs          Benign prostatic hyperplasia without lower urinary tract symptoms  Cont flomax and finasteride  Has been making urine and several episodes of incontinence, documented unmeasured urine per nursing. Frequency of urination has decreased as PO intake has decreased but pt is still making urine and soaking the pad  UA negative for infection, bladder scan pending  Cont to monitor          Essential hypertension  Risk factor for stroke, primary team goal SBP <160  Cont Torsemide 20 mg q12, losartan 100 mg qd, and Carvedilol 25 mg q12                 VTE Risk Mitigation (From admission, onward)        Ordered     warfarin (COUMADIN) tablet 5 mg  Daily      04/02/19 1311     IP VTE HIGH RISK PATIENT  Once      03/22/19 2207              Thank you for your consult. I will follow-up with patient. Please contact us if you have any additional questions.    Marivel Casanova MD  Department of Hospital Medicine   [facility]

## 2019-04-02 NOTE — SUBJECTIVE & OBJECTIVE
No current facility-administered medications on file prior to encounter.      Current Outpatient Medications on File Prior to Encounter   Medication Sig    allopurinol (ZYLOPRIM) 100 MG tablet Take 100 mg by mouth once daily.    amLODIPine (NORVASC) 10 MG tablet Take 10 mg by mouth once daily.    aspirin (ECOTRIN) 81 MG EC tablet Take 81 mg by mouth once daily.      atorvastatin (LIPITOR) 80 MG tablet TAKE 1 TABLET (80 MG TOTAL) BY MOUTH ONCE DAILY.    budesonide-formoterol 160-4.5 mcg (SYMBICORT) 160-4.5 mcg/actuation HFAA Inhale 2 puffs into the lungs every 12 (twelve) hours. Controller    carvedilol (COREG) 12.5 MG tablet Take 12.5 mg by mouth 2 (two) times daily with meals.    celecoxib (CELEBREX) 200 MG capsule Take 1 capsule (200 mg total) by mouth once daily.    finasteride (PROSCAR) 5 mg tablet TAKE 1 TABLET ONCE DAILY.    fluticasone (FLONASE) 50 mcg/actuation nasal spray 2 sprays (100 mcg total) by Each Nare route once daily.    gabapentin (NEURONTIN) 300 MG capsule Take 1 capsule (300 mg total) by mouth every evening.    isosorbide mononitrate (ISMO,MONOKET) 10 mg tablet TAKE 1 TABLET BY MOUTH EVERY DAY IN THE EVENING    meclizine (ANTIVERT) 25 mg tablet Take 1 tablet (25 mg total) by mouth 3 (three) times daily as needed for Dizziness.    mirtazapine (REMERON) 7.5 MG Tab TAKE 1 TABLET BY MOUTH EVERY EVENING.    montelukast (SINGULAIR) 10 mg tablet Take 10 mg by mouth every evening.    NITROSTAT 0.4 mg SL tablet PLACE 1 TABLET (0.4 MG TOTAL) UNDER THE TONGUE EVERY 5 (FIVE) MINUTES AS NEEDED FOR CHEST PAIN.    omeprazole (PRILOSEC) 20 MG capsule TAKE 1 CAPSULE BY MOUTH EVERY DAY    tamsulosin (FLOMAX) 0.4 mg Cap TAKE 1 CAPSULE BY MOUTH EVERY DAY    tiotropium bromide (SPIRIVA RESPIMAT) 1.25 mcg/actuation Mist Inhale 2.5 mcg into the lungs once daily. Controller    torsemide (DEMADEX) 20 MG Tab Take 1 tablet (20 mg total) by mouth 2 (two) times daily.    warfarin (COUMADIN) 7.5 MG tablet  Take 1 tablet (7.5 mg total) by mouth Daily.    zolpidem (AMBIEN) 5 MG Tab TAKE 1 TABLET BY MOUTH EVERY EVENING AS NEEDED    albuterol (PROVENTIL/VENTOLIN HFA) 90 mcg/actuation inhaler 2 puffs every 4 hours as needed for cough, wheeze, or shortness of breath    albuterol-ipratropium (DUO-NEB) 2.5 mg-0.5 mg/3 mL nebulizer solution Take 3 mLs by nebulization every 6 (six) hours as needed for Wheezing or Shortness of Breath.       Review of patient's allergies indicates:   Allergen Reactions    Ace inhibitors Other (See Comments)     Cough    Arb-angiotensin receptor antagonist Itching    Eplerenone Other (See Comments)     Marked bradycardia, 40, tiredness and weakness      Sulfa (sulfonamide antibiotics) Itching     Patient says this was 10 years ago and doesn't remember what happened       Past Medical History:   Diagnosis Date    NICK (acute kidney injury) 7/29/2016    Allergy     Anemia, mild 12/15/2014    Arthritis     Gout    Benign essential HTN 3/27/2012    BMI 29.0-29.9,adult 5/10/2018    BPH (benign prostatic hyperplasia)     BPH (benign prostatic hyperplasia)     CAD (coronary artery disease) 2006    Chronic kidney disease     due to ibuprofen    Colon polyp     CRF (chronic renal failure), stage 5     Diverticulosis     Gastritis     GERD (gastroesophageal reflux disease)     Gout     History of colon polyps 5/3/2018    HTN (hypertension) 3/27/2012    Hyperlipidemia     Hyperlipidemia     LLL pneumonia 6/14/2018    LVH (left ventricular hypertrophy)     Mesenteric ischemia     Murmur, cardiac 3/27/2012    GRICELDA (obstructive sleep apnea)     DOES NOT USE A MACHINE    Sinus problem     Syncope and collapse      Past Surgical History:   Procedure Laterality Date    APPENDECTOMY      BLOCK-NERVE Left 5/31/2016    [facility] OR    CARDIAC CATHETERIZATION      COLONOSCOPY  2011    COLONOSCOPY N/A 5/3/2018    [facility]  ENDO    COLONOSCOPY N/A 9/10/2015    [facility] ENDO    CORONARY ARTERY BYPASS GRAFT  4/2007    x 1    CYSTOSCOPY N/A 8/30/2017    [facility] OR    CYSTOSCOPY N/A 11/10/2015    [facility] at [facility] OR    ESOPHAGOGASTRODUODENOSCOPY (EGD) N/A 1/4/2016    Performed by Tra [facility] at [facility] ENDO (2ND FLR)    ESOPHAGOGASTRODUODENOSCOPY (EGD) N/A 10/15/2014    [facility] at [facility] ENDO    INJECTION-STEROID-EPIDURAL-TRANSFORAMINAL Left 2/25/2016    [facility] at [facility] OR    JOINT REPLACEMENT      left knee total replacement  X 3    mid leftt finger      from a cactuss    MOLE REMOVAL  2016    RADIOFREQUENCY THERMOCOAGULATION (RFTC)-NERVE-MEDIAN BRANCH-LUMBAR Left 4/11/2016    [facility] at [facility] OR    rotative cuff      no rotative cuffs on bilat shoulders has pins     SHOULDER SURGERY      shoulder surgery bilat  RIGHT X 4; LEFT X 3    TRANSRECTAL ULTRASOUND GUIDED PROSTATE BIOPSY Bilateral 11/10/2015    [facility] at [facility] OR    ULTRASOUND-ENDOSCOPIC-UPPER N/A 5/31/2017    Performed by Tra [facility] at [facility] ENDO (2ND FLR)    ULTRASOUND-ENDOSCOPIC-UPPER N/A 1/4/2016    Performed by Tra [facility] at [facility] ENDO (2ND FLR)    ULTRASOUND-ENDOSCOPIC-UPPER N/A 1/16/2015    Performed by Jason Saleem MD at [facility] ENDO (2ND FLR)     Family History     Problem Relation (Age of Onset)    Cancer Father    Heart disease Mother, Sister    Hypertension Brother    Pneumonia Sister    Stroke Sister    Sudden death Father        Tobacco Use    Smoking status: Never Smoker    Smokeless tobacco: Never Used   Substance and Sexual Activity    Alcohol use: No    Drug use: No    Sexual activity: Not on file     Review of Systems   Constitutional: Positive for activity change. Negative for appetite change, chills, fever and unexpected weight change.    Respiratory: Negative for cough and shortness of breath.    Cardiovascular: Positive for chest pain (history of unstable angina).   Gastrointestinal: Positive for constipation. Negative for abdominal distention, abdominal pain, diarrhea, nausea and vomiting.   Genitourinary: Negative.    Hematological: Bruises/bleeds easily.     Objective:     Vital Signs (Most Recent):  Temp: 98.2 °F (36.8 °C) (04/02/19 0805)  Pulse: 68 (04/02/19 1100)  Resp: 18 (04/02/19 0805)  BP: (!) 155/76 (04/02/19 0805)  SpO2: 98 % (04/02/19 0805) Vital Signs (24h Range):  Temp:  [97.3 °F (36.3 °C)-100.6 °F (38.1 °C)] 98.2 °F (36.8 °C)  Pulse:  [63-87] 68  Resp:  [18] 18  SpO2:  [95 %-100 %] 98 %  BP: ()/(47-76) 155/76     Weight: 106.6 kg (235 lb 0.2 oz)  Body mass index is 31.01 kg/m².    Physical Exam   Constitutional: He appears well-developed and well-nourished. No distress.   Cardiovascular: Normal rate and regular rhythm.   Pulmonary/Chest: Effort normal.   Abdominal: Soft. He exhibits no distension. There is no tenderness. There is no guarding. A hernia (easily reducible 1 cm umbilical hernia without tenderness; bruising around umbilicus related to subQ injections) is present.   Musculoskeletal:   Right hemiparesis.   Neurological: He is alert.   Nursing note and vitals reviewed.      Significant Labs:  CBC:   Recent Labs   Lab 04/02/19  1023   WBC 7.19   RBC 2.71*   HGB 8.6*   HCT 25.6*   PLT 98*   MCV 95   MCH 31.7*   MCHC 33.6     CMP:   Recent Labs   Lab 04/02/19  1023   *   CALCIUM 8.1*   ALBUMIN 2.8*   PROT 5.4*      K 4.2   CO2 24      BUN 37*   CREATININE 4.8*   ALKPHOS 93   ALT 29   AST 21   BILITOT 0.8     Coagulation:   Recent Labs   Lab 04/01/19  0008  04/02/19  1023   LABPROT  --    < > 18.2*  18.2*   INR  --    < > 1.9*  1.9*   APTT 25.7  --   --     < > = values in this interval not displayed.       Significant Diagnostics:  I have reviewed all pertinent imaging results/findings within the  past 24 hours.

## 2019-04-02 NOTE — ASSESSMENT & PLAN NOTE
Per primary team. Risk factor for stroke  CHADsVASc 7  Coreg for rate control, coumadin 5 mg daily per primary team  Goal INR 2-3, INR 1.9

## 2019-04-02 NOTE — HPI
Micheal Hunt is a 78-year-old male with PMHx of HTN, HLD, obesity, DMII, ESRD on HD, GRICELDA, LVH, CAD s/p CABG (2007), CHF, a-fib, anemia of chronic disease, BPH, gout, and recent admission for PNA.  Patient admitted to [facility] on 3/18/19 with COPD exacerbation.  Upon discharge, patient with unstable angina and was transferred to [facility] on 3/22/19 for further evaluation and management.  On arrival, Cardiology consulted and ACS protocol was not recommended.  On 3/24/19, patient developed right-sided weakness, facial droop, and dysarthria.  Stroke code called.  CTA stroke multiphase showed significant intracranial and extracranial atherosclerotic disease.  MRI brain revealed L thalamic infarct.  Vascular Neurology following, and etiology cardioembolic vs atheroembolic.  Hospital course further complicated by groin hematoma with anemia requiring 1 unit PRBC on 3/31, R arm cellulitis (started on PO Clindamycin), worsening neurologic deficits (repeat MRI brain on 3/26 showed new acute L PCA territory infarctions), and dysphagia (SLP following).  Stepped down to floor on 3/31/19.      Functional History: Patient lives in [place], [place], alone (daughter lives next door), in a single story home with 3 steps to enter (ramp access available).  Prior to admission, he was active and (I) with ADLs, including working ([employer]) and driving, and mobility.  Occasionally used SC 2/2 arthritis.  He is right handed.  DME: [facility].

## 2019-04-02 NOTE — ASSESSMENT & PLAN NOTE
R forearm edema, mild erythema  Arterial line removed, evaluated by ortho for compartment syndrome, negative  Started on clindamycin 300 mg q6hrs for possible cellulitis in the [facility] on 3/30  4/1 night with low grade fever T 100.6, hypotension x 1 though hypertensive otherwise  4/2 edema worsening, primary team d/c'd clindamycin and started IV vancomycin x 1 and cefepime. Random vanc level in the AM    Plan:   Agree with continuing abx for now, will continue to monitor and de-escalate appropriately  Ordered RUE U/S to r/o DVT  Ordered procal

## 2019-04-02 NOTE — PHYSICIAN QUERY
PT Name: Micheal Hunt  MR #: [id number]     CDS: Alisson Covington RN, CCDS       Contact information: [email or web address]  This form is a permanent document in the medical record.     Query Date: April 2, 2019    Physician Query - Neurological Condition Clarification    By submitting this query, we are merely seeking further clarification of documentation to reflect the severity of illness of your patient. Please utilize your independent clinical judgment when addressing the question(s) below.    The Medical record reflects the following:     Indicators   Supporting Clinical Findings Location in Medical Record   X            X AMS, Confusion,  LOC, etc.  Encephalopathy  -- Fluctuating mental status.  -- Similar episode 2 nights ago with improvement by the next morning. Sundowning?  -- EEG    Encephalopathy  -- Fluctuating mental status.  -- Similar episode 2 nights ago with improvement by the next morning. Sundowning?  -- EEG with generalized slowing and no electrographic seizures recorded.   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, on dialysis  CAD  HLD  HTN  Carotid artery stenosis  ESRD (end stage renal disease) on dialysis  Benign prostatic hyperplasia without lower urinary tract symptoms  Long term (current) use of anticoagulants  Anemia of chronic disease  Type 2 diabetes mellitus, without long-term current use of insulin  Gout, arthritis   3/29-NCC PN ([place])    Radiology Findings      Electrolyte Imbalance      Medication      Treatment             X Other IMPRESSION:   This is an abnormal EEG because of generalized background slowing consistent with diffuse cortical dysfunction and a mild to moderate encephalopathy.  This finding is  nonspecific with regards to etiology but can be seen in the setting of toxic/metabolic derangements, infection, and as a medication effect.  There are no prominent focal findings, however, encephalopathy obscures focal findings.  There are no epileptiform discharges and no electrographic seizures.   3/28-EEG     Encephalopathy- is a general term for any diffuse disease of the brain that alters brain function or structure. Treatment of the cognitive dysfunction varies but is ultimately dependent on the treatment of the underlying condition.    Major Symptoms of Encephalopathy - Decreased level of consciousness, fluctuating alertness/concentration, confusion, agitation, lethargy, somnolence, drowsiness, obtundation, stupor, or coma.         References: National Institutes of Healths (NIH) National [place] of Neurological Disorders and Strokes;  HCPro 2016; Advisory Board     Clinical Guidelines:   These guidelines will set system standards to assist providers in managing, documentation, and coding of encephalopathy. The intent of this document is to serve as a system guideline, not replace the providers clinical judgment:  Provider, please specify the diagnosis or diagnoses associated with above clinical findings.  [   ] Metabolic Encephalopathy - Due to electrolye imbalance, metabolic derangements, or infections processes, includes Septic Encephalopathy   [   ] Toxic Encephalopathy - Due to drugs, chemicals, or other toxic substances   [   ] Other Encephalopathy - Includes uremic encephalopathy   [   ] Unspecified Encephalopathy      [  x ] Other Neurological Condition- Includes Post-ictal altered mental status. (please specify condition): stroke   [   ]  Clinically Undetermined     Please document in your progress notes daily for the duration of treatment until resolved, and include in your discharge summary.

## 2019-04-02 NOTE — PLAN OF CARE
Problem: Adult Inpatient Plan of Care  Goal: Plan of Care Review      Recommendations    Recommendation/Intervention:     1. Continue current diet with texture per SLP.   2. If po intake <50%, recommend adding Boost Glucose Control with meals.   3. RD following.     Goals: meet >85% EEN/EPN  Nutrition Goal Status: new

## 2019-04-02 NOTE — CONSULTS
[facility]  General Surgery  Consult Note    Patient Name: Micheal Hunt  MRN: [id number]  Code Status: Full Code  Admission Date: 3/22/2019  Hospital Length of Stay: 11 days  Attending Physician: Tray Bazan MD  Primary Care Provider: Pk Lakhani MD    Patient information was obtained from patient, relative(s) and past medical records.     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 male with history of HTN, HLD, GRICELDA, CAD (s/p CABG 2007), CKD, CHFpEF, and recent admission for PNA was transferred to [facility] from [place] for unstable angina.  Prior to heart cath, patient had a stroke.  He has right sided hemiparesis.  He has an umbilical hernia that has been present for a long time.  It does not bother him.  He is tolerating diet and having bowel function.  CT abdomen/pelvis unremarkable.    No current facility-administered medications on file prior to encounter.      Current Outpatient Medications on File Prior to Encounter   Medication Sig    allopurinol (ZYLOPRIM) 100 MG tablet Take 100 mg by mouth once daily.    amLODIPine (NORVASC) 10 MG tablet Take 10 mg by mouth once daily.    aspirin (ECOTRIN) 81 MG EC tablet Take 81 mg by mouth once daily.      atorvastatin (LIPITOR) 80 MG tablet TAKE 1 TABLET (80 MG TOTAL) BY MOUTH ONCE DAILY.    budesonide-formoterol 160-4.5 mcg (SYMBICORT) 160-4.5 mcg/actuation HFAA Inhale 2 puffs into the lungs every 12 (twelve) hours. Controller    carvedilol (COREG) 12.5 MG tablet Take 12.5 mg by mouth 2 (two) times daily with meals.    celecoxib (CELEBREX) 200 MG capsule Take 1 capsule (200 mg total) by mouth once daily.    finasteride (PROSCAR) 5 mg tablet TAKE 1  TABLET ONCE DAILY.    fluticasone (FLONASE) 50 mcg/actuation nasal spray 2 sprays (100 mcg total) by Each Nare route once daily.    gabapentin (NEURONTIN) 300 MG capsule Take 1 capsule (300 mg total) by mouth every evening.    isosorbide mononitrate (ISMO,MONOKET) 10 mg tablet TAKE 1 TABLET BY MOUTH EVERY DAY IN THE EVENING    meclizine (ANTIVERT) 25 mg tablet Take 1 tablet (25 mg total) by mouth 3 (three) times daily as needed for Dizziness.    mirtazapine (REMERON) 7.5 MG Tab TAKE 1 TABLET BY MOUTH EVERY EVENING.    montelukast (SINGULAIR) 10 mg tablet Take 10 mg by mouth every evening.    NITROSTAT 0.4 mg SL tablet PLACE 1 TABLET (0.4 MG TOTAL) UNDER THE TONGUE EVERY 5 (FIVE) MINUTES AS NEEDED FOR CHEST PAIN.    omeprazole (PRILOSEC) 20 MG capsule TAKE 1 CAPSULE BY MOUTH EVERY DAY    tamsulosin (FLOMAX) 0.4 mg Cap TAKE 1 CAPSULE BY MOUTH EVERY DAY    tiotropium bromide (SPIRIVA RESPIMAT) 1.25 mcg/actuation Mist Inhale 2.5 mcg into the lungs once daily. Controller    torsemide (DEMADEX) 20 MG Tab Take 1 tablet (20 mg total) by mouth 2 (two) times daily.    warfarin (COUMADIN) 7.5 MG tablet Take 1 tablet (7.5 mg total) by mouth Daily.    zolpidem (AMBIEN) 5 MG Tab TAKE 1 TABLET BY MOUTH EVERY EVENING AS NEEDED    albuterol (PROVENTIL/VENTOLIN HFA) 90 mcg/actuation inhaler 2 puffs every 4 hours as needed for cough, wheeze, or shortness of breath    albuterol-ipratropium (DUO-NEB) 2.5 mg-0.5 mg/3 mL nebulizer solution Take 3 mLs by nebulization every 6 (six) hours as needed for Wheezing or Shortness of Breath.       Review of patient's allergies indicates:   Allergen Reactions    Ace inhibitors Other (See Comments)     Cough    Arb-angiotensin receptor antagonist Itching    Eplerenone Other (See Comments)     Marked bradycardia, 40, tiredness and weakness      Sulfa (sulfonamide antibiotics) Itching     Patient says this was 10 years ago and doesn't remember what happened       Past Medical  History:   Diagnosis Date    NICK (acute kidney injury) 7/29/2016    Allergy     Anemia, mild 12/15/2014    Arthritis     Gout    Benign essential HTN 3/27/2012    BMI 29.0-29.9,adult 5/10/2018    BPH (benign prostatic hyperplasia)     BPH (benign prostatic hyperplasia)     CAD (coronary artery disease) 2006    Chronic kidney disease     due to ibuprofen    Colon polyp     CRF (chronic renal failure), stage 5     Diverticulosis     Gastritis     GERD (gastroesophageal reflux disease)     Gout     History of colon polyps 5/3/2018    HTN (hypertension) 3/27/2012    Hyperlipidemia     Hyperlipidemia     LLL pneumonia 6/14/2018    LVH (left ventricular hypertrophy)     Mesenteric ischemia     Murmur, cardiac 3/27/2012    GRICELDA (obstructive sleep apnea)     DOES NOT USE A MACHINE    Sinus problem     Syncope and collapse      Past Surgical History:   Procedure Laterality Date    APPENDECTOMY      BLOCK-NERVE Left 5/31/2016    [facility]    CARDIAC CATHETERIZATION      COLONOSCOPY  2011    COLONOSCOPY N/A 5/3/2018    [facility]    COLONOSCOPY N/A 9/10/2015    [facility] at [facility]    CORONARY ARTERY BYPASS GRAFT  4/2007    x 1    CYSTOSCOPY N/A 8/30/2017    [facility]    CYSTOSCOPY N/A 11/10/2015    [facility] at [facility]    ESOPHAGOGASTRODUODENOSCOPY (EGD) N/A 1/4/2016    [facility] (2ND F[facility])    ESOPHAGOGASTRODUODENOSCOPY (EGD) N/A 10/15/2014    Per[facility] by [facility] at [facility] ENDO    INJECTION-STEROID-EPIDURAL-TRANSFORAMINAL Left 2/25/2016    [facility]    JOINT REPLACEMENT      left knee total replacement  X 3    mid leftt finger      from a cactuss    MOLE REMOVAL  2016    RADIOFREQUENCY THERMOCOAGULATION (RFTC)-NERVE-MEDIAN BRANCH-LUMBAR Left 4/11/2016     [facility]    rotative cuff      no rotative cuffs on bilat shoulders has pins     SHOULDER SURGERY      shoulder surgery bilat  RIGHT X 4; LEFT X 3    TRANSRECTAL ULTRASOUND GUIDED PROSTATE BIOPSY Bilateral 11/10/2015    [facility]    ULTRASOUND-ENDOSCOPIC-UPPER N/A 5/31/2017    Per[facility] at [facility] ENDO (2ND FLR)    ULTRASOUND-ENDOSCOPIC-UPPER N/A 1/4/2016    Performed by Tra [facility] at [facility] ENDO (2ND FLR)    ULTRASOUND-ENDOSCOPIC-UPPER N/A 1/16/2015    Performed by Jason Saleem MD at [facility] ENDO (2ND FLR)     Family History     Problem Relation (Age of Onset)    Cancer Father    Heart disease Mother, Sister    Hypertension Brother    Pneumonia Sister    Stroke Sister    Sudden death Father        Tobacco Use    Smoking status: Never Smoker    Smokeless tobacco: Never Used   Substance and Sexual Activity    Alcohol use: No    Drug use: No    Sexual activity: Not on file     Review of Systems   Constitutional: Positive for activity change. Negative for appetite change, chills, fever and unexpected weight change.   Respiratory: Negative for cough and shortness of breath.    Cardiovascular: Positive for chest pain (history of unstable angina).   Gastrointestinal: Positive for constipation. Negative for abdominal distention, abdominal pain, diarrhea, nausea and vomiting.   Genitourinary: Negative.    Hematological: Bruises/bleeds easily.     Objective:     Vital Signs (Most Recent):  Temp: 98.2 °F (36.8 °C) (04/02/19 0805)  Pulse: 68 (04/02/19 1100)  Resp: 18 (04/02/19 0805)  BP: (!) 155/76 (04/02/19 0805)  SpO2: 98 % (04/02/19 0805) Vital Signs (24h Range):  Temp:  [97.3 °F (36.3 °C)-100.6 °F (38.1 °C)] 98.2 °F (36.8 °C)  Pulse:  [63-87] 68  Resp:  [18] 18  SpO2:  [95 %-100 %] 98 %  BP: ()/(47-76) 155/76     Weight: 106.6 kg (235 lb 0.2 oz)  Body mass index is 31.01 kg/m².    Physical Exam   Constitutional: He  appears well-developed and well-nourished. No distress.   Cardiovascular: Normal rate and regular rhythm.   Pulmonary/Chest: Effort normal.   Abdominal: Soft. He exhibits no distension. There is no tenderness. There is no guarding. A hernia (easily reducible 1 cm umbilical hernia without tenderness; bruising around umbilicus related to subQ injections) is present.   Musculoskeletal:   Right hemiparesis.   Neurological: He is alert.   Nursing note and vitals reviewed.      Significant Labs:  CBC:   Recent Labs   Lab 04/02/19  1023   WBC 7.19   RBC 2.71*   HGB 8.6*   HCT 25.6*   PLT 98*   MCV 95   MCH 31.7*   MCHC 33.6     CMP:   Recent Labs   Lab 04/02/19  1023   *   CALCIUM 8.1*   ALBUMIN 2.8*   PROT 5.4*      K 4.2   CO2 24      BUN 37*   CREATININE 4.8*   ALKPHOS 93   ALT 29   AST 21   BILITOT 0.8     Coagulation:   Recent Labs   Lab 04/01/19  0008  04/02/19  1023   LABPROT  --    < > 18.2*  18.2*   INR  --    < > 1.9*  1.9*   APTT 25.7  --   --     < > = values in this interval not displayed.       Significant Diagnostics:  I have reviewed all pertinent imaging results/findings within the past 24 hours.    Assessment/Plan:     Umbilical hernia without obstruction and without gangrene  -Hernia is asymptomatic  -Would not recommend repair until fully recovered from stroke and has cardiac clearance  -Will provide a PRN follow up as outpatient if patient ever desires to have hernia fixed      VTE Risk Mitigation (From admission, onward)        Ordered     warfarin tablet 7.5 mg  Daily      03/31/19 1006     IP VTE HIGH RISK PATIENT  Once      03/22/19 2207          Thank you for your consult. I will sign off. Please contact us if you have any additional questions.    Stuart Farmer Jr., MD  General Surgery  [facility]

## 2019-04-03 LAB
ALBUMIN SERPL BCP-MCNC: 2.7 G/DL (ref 3.5–5.2)
ALP SERPL-CCNC: 85 U/L (ref 55–135)
ALT SERPL W/O P-5'-P-CCNC: 23 U/L (ref 10–44)
ANION GAP SERPL CALC-SCNC: 9 MMOL/L (ref 8–16)
AST SERPL-CCNC: 17 U/L (ref 10–40)
BASOPHILS # BLD AUTO: 0.02 K/UL (ref 0–0.2)
BASOPHILS NFR BLD: 0.3 % (ref 0–1.9)
BILIRUB SERPL-MCNC: 0.9 MG/DL (ref 0.1–1)
BUN SERPL-MCNC: 49 MG/DL (ref 8–23)
CALCIUM SERPL-MCNC: 7.5 MG/DL (ref 8.7–10.5)
CHLORIDE SERPL-SCNC: 109 MMOL/L (ref 95–110)
CO2 SERPL-SCNC: 22 MMOL/L (ref 23–29)
CREAT SERPL-MCNC: 5.6 MG/DL (ref 0.5–1.4)
DIFFERENTIAL METHOD: ABNORMAL
EOSINOPHIL # BLD AUTO: 0.1 K/UL (ref 0–0.5)
EOSINOPHIL NFR BLD: 1.6 % (ref 0–8)
ERYTHROCYTE [DISTWIDTH] IN BLOOD BY AUTOMATED COUNT: 15.6 % (ref 11.5–14.5)
EST. GFR  (AFRICAN AMERICAN): 10.3 ML/MIN/1.73 M^2
EST. GFR  (NON AFRICAN AMERICAN): 8.9 ML/MIN/1.73 M^2
GLUCOSE SERPL-MCNC: 101 MG/DL (ref 70–110)
HCT VFR BLD AUTO: 23.4 % (ref 40–54)
HGB BLD-MCNC: 7.7 G/DL (ref 14–18)
IMM GRANULOCYTES # BLD AUTO: 0.09 K/UL (ref 0–0.04)
IMM GRANULOCYTES NFR BLD AUTO: 1.5 % (ref 0–0.5)
INR PPP: 2 (ref 0.8–1.2)
LYMPHOCYTES # BLD AUTO: 1.3 K/UL (ref 1–4.8)
LYMPHOCYTES NFR BLD: 21.9 % (ref 18–48)
MCH RBC QN AUTO: 31.3 PG (ref 27–31)
MCHC RBC AUTO-ENTMCNC: 32.9 G/DL (ref 32–36)
MCV RBC AUTO: 95 FL (ref 82–98)
MONOCYTES # BLD AUTO: 0.5 K/UL (ref 0.3–1)
MONOCYTES NFR BLD: 7.7 % (ref 4–15)
NEUTROPHILS # BLD AUTO: 4.1 K/UL (ref 1.8–7.7)
NEUTROPHILS NFR BLD: 67 % (ref 38–73)
NRBC BLD-RTO: 1 /100 WBC
PLATELET # BLD AUTO: 106 K/UL (ref 150–350)
PMV BLD AUTO: 9.7 FL (ref 9.2–12.9)
POCT GLUCOSE: 124 MG/DL (ref 70–110)
POCT GLUCOSE: 136 MG/DL (ref 70–110)
POCT GLUCOSE: 172 MG/DL (ref 70–110)
POTASSIUM SERPL-SCNC: 4.3 MMOL/L (ref 3.5–5.1)
PROCALCITONIN SERPL IA-MCNC: 0.41 NG/ML
PROT SERPL-MCNC: 5.1 G/DL (ref 6–8.4)
PROTHROMBIN TIME: 19.9 SEC (ref 9–12.5)
RBC # BLD AUTO: 2.46 M/UL (ref 4.6–6.2)
SODIUM SERPL-SCNC: 140 MMOL/L (ref 136–145)
VANCOMYCIN SERPL-MCNC: 13.5 UG/ML
WBC # BLD AUTO: 6.13 K/UL (ref 3.9–12.7)

## 2019-04-03 PROCEDURE — 63600175 PHARM REV CODE 636 W HCPCS: Mod: JG | Performed by: NURSE PRACTITIONER

## 2019-04-03 PROCEDURE — 25000003 PHARM REV CODE 250: Performed by: NURSE PRACTITIONER

## 2019-04-03 PROCEDURE — 25000003 PHARM REV CODE 250: Performed by: INTERNAL MEDICINE

## 2019-04-03 PROCEDURE — 85025 COMPLETE CBC W/AUTO DIFF WBC: CPT

## 2019-04-03 PROCEDURE — 92526 ORAL FUNCTION THERAPY: CPT

## 2019-04-03 PROCEDURE — 94799 UNLISTED PULMONARY SVC/PX: CPT

## 2019-04-03 PROCEDURE — 80100016 HC MAINTENANCE HEMODIALYSIS

## 2019-04-03 PROCEDURE — 25000003 PHARM REV CODE 250: Performed by: HOSPITALIST

## 2019-04-03 PROCEDURE — 92507 TX SP LANG VOICE COMM INDIV: CPT

## 2019-04-03 PROCEDURE — 84145 PROCALCITONIN (PCT): CPT

## 2019-04-03 PROCEDURE — 25000003 PHARM REV CODE 250: Performed by: PHYSICIAN ASSISTANT

## 2019-04-03 PROCEDURE — 80202 ASSAY OF VANCOMYCIN: CPT

## 2019-04-03 PROCEDURE — 90935 PR HEMODIALYSIS, ONE EVALUATION: ICD-10-PCS | Mod: ,,, | Performed by: NURSE PRACTITIONER

## 2019-04-03 PROCEDURE — 90935 HEMODIALYSIS ONE EVALUATION: CPT | Mod: ,,, | Performed by: NURSE PRACTITIONER

## 2019-04-03 PROCEDURE — 99232 PR SUBSEQUENT HOSPITAL CARE,LEVL II: ICD-10-PCS | Mod: GC,,, | Performed by: HOSPITALIST

## 2019-04-03 PROCEDURE — 99232 PR SUBSEQUENT HOSPITAL CARE,LEVL II: ICD-10-PCS | Mod: ,,, | Performed by: NURSE PRACTITIONER

## 2019-04-03 PROCEDURE — 99233 SBSQ HOSP IP/OBS HIGH 50: CPT | Mod: GC,,, | Performed by: PSYCHIATRY & NEUROLOGY

## 2019-04-03 PROCEDURE — 36415 COLL VENOUS BLD VENIPUNCTURE: CPT

## 2019-04-03 PROCEDURE — 99232 SBSQ HOSP IP/OBS MODERATE 35: CPT | Mod: GC,,, | Performed by: HOSPITALIST

## 2019-04-03 PROCEDURE — 99232 SBSQ HOSP IP/OBS MODERATE 35: CPT | Mod: ,,, | Performed by: NURSE PRACTITIONER

## 2019-04-03 PROCEDURE — 25000003 PHARM REV CODE 250: Performed by: STUDENT IN AN ORGANIZED HEALTH CARE EDUCATION/TRAINING PROGRAM

## 2019-04-03 PROCEDURE — 85610 PROTHROMBIN TIME: CPT

## 2019-04-03 PROCEDURE — 80053 COMPREHEN METABOLIC PANEL: CPT

## 2019-04-03 PROCEDURE — 20600001 HC STEP DOWN PRIVATE ROOM

## 2019-04-03 PROCEDURE — 99233 PR SUBSEQUENT HOSPITAL CARE,LEVL III: ICD-10-PCS | Mod: GC,,, | Performed by: PSYCHIATRY & NEUROLOGY

## 2019-04-03 RX ORDER — DOXYCYCLINE HYCLATE 100 MG
100 TABLET ORAL EVERY 12 HOURS
Status: DISCONTINUED | OUTPATIENT
Start: 2019-04-03 | End: 2019-04-09 | Stop reason: HOSPADM

## 2019-04-03 RX ORDER — SODIUM CHLORIDE 9 MG/ML
INJECTION, SOLUTION INTRAVENOUS ONCE
Status: COMPLETED | OUTPATIENT
Start: 2019-04-03 | End: 2019-04-03

## 2019-04-03 RX ADMIN — INSULIN DETEMIR 8 UNITS: 100 INJECTION, SOLUTION SUBCUTANEOUS at 09:04

## 2019-04-03 RX ADMIN — SODIUM CHLORIDE 300 ML: 0.9 INJECTION, SOLUTION INTRAVENOUS at 03:04

## 2019-04-03 RX ADMIN — ERYTHROPOIETIN 10000 UNITS: 10000 INJECTION, SOLUTION INTRAVENOUS; SUBCUTANEOUS at 03:04

## 2019-04-03 RX ADMIN — TAMSULOSIN HYDROCHLORIDE 0.4 MG: 0.4 CAPSULE ORAL at 09:04

## 2019-04-03 RX ADMIN — CARVEDILOL 25 MG: 25 TABLET, FILM COATED ORAL at 09:04

## 2019-04-03 RX ADMIN — POLYETHYLENE GLYCOL 3350 17 G: 17 POWDER, FOR SOLUTION ORAL at 09:04

## 2019-04-03 RX ADMIN — FINASTERIDE 5 MG: 5 TABLET, FILM COATED ORAL at 09:04

## 2019-04-03 RX ADMIN — GABAPENTIN 300 MG: 300 CAPSULE ORAL at 08:04

## 2019-04-03 RX ADMIN — DOXYCYCLINE HYCLATE 100 MG: 100 TABLET, COATED ORAL at 08:04

## 2019-04-03 RX ADMIN — RAMELTEON 8 MG: 8 TABLET, FILM COATED ORAL at 08:04

## 2019-04-03 RX ADMIN — TORSEMIDE 20 MG: 20 TABLET ORAL at 09:04

## 2019-04-03 RX ADMIN — PANTOPRAZOLE SODIUM 40 MG: 40 TABLET, DELAYED RELEASE ORAL at 09:04

## 2019-04-03 RX ADMIN — CARVEDILOL 25 MG: 25 TABLET, FILM COATED ORAL at 06:04

## 2019-04-03 RX ADMIN — LOSARTAN POTASSIUM 100 MG: 50 TABLET, FILM COATED ORAL at 10:04

## 2019-04-03 RX ADMIN — STANDARDIZED SENNA CONCENTRATE AND DOCUSATE SODIUM 1 TABLET: 8.6; 5 TABLET, FILM COATED ORAL at 09:04

## 2019-04-03 RX ADMIN — ATORVASTATIN CALCIUM 80 MG: 20 TABLET, FILM COATED ORAL at 08:04

## 2019-04-03 RX ADMIN — STANDARDIZED SENNA CONCENTRATE AND DOCUSATE SODIUM 1 TABLET: 8.6; 5 TABLET, FILM COATED ORAL at 08:04

## 2019-04-03 RX ADMIN — WARFARIN SODIUM 7.5 MG: 2.5 TABLET ORAL at 06:04

## 2019-04-03 RX ADMIN — INSULIN ASPART 1 UNITS: 100 INJECTION, SOLUTION INTRAVENOUS; SUBCUTANEOUS at 10:04

## 2019-04-03 RX ADMIN — ALLOPURINOL 100 MG: 100 TABLET ORAL at 09:04

## 2019-04-03 RX ADMIN — TORSEMIDE 20 MG: 20 TABLET ORAL at 08:04

## 2019-04-03 NOTE — PT/OT/SLP PROGRESS
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 Family friends present in room and audio recording ST session. Family friends call patient 'Dad.' They (Gini and Brendon) report they are like family to the patient.        Objective:     Has the patient been evaluated by SLP for swallowing?   Yes  Keep patient NPO? No   Current Respiratory Status: room air      Dysphagia: Per family friend, Gini, patient appears to have bit his tongue in his sleep. SLP noted tip of tongue on right side to be red. SLP provided patient/family friends with handout listing basic dysphagia therapy exercises. Patient demonstrated effortful swallow, falsetto, and BOT exercises given mod cues to increase accuracy. Noted difficulty following directions for Mendelsohn. eJssica deferred 2/2 patient reporting pain on tip of tongue from biting it. Gini reports adequate tolerance of meals, but some new difficulty 2/2 tongue pain. SLP reported availability of softer diet, however, Gini reported patient's diet is already restrict 2/2 renal, cardiac, and diabetic restrictions. Patient assessed with sips of water and nectar thick water. Patient noted to take large quick sips despite SLP cues to take small, single sips. Multiple swallows and throat clear noted with thin liquid  trial. Noted coughing s/p large consecutive sips of nectar thick water. No overt s/s of aspiration with single cup sips of nectar thick water.     Speech/Language/Cognition: Patient initially reported he did not have kids. Given mod-max cues, patient named his children and grandchildren. He required some cues to name family members in pictures in room, his horse, and his dog. 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 He initially reported it was in [place], however, was able to recall ranch being in [place] after a short delay given min cues. Patient also with initial difficulty naming family friend present in room (Brendon), however, able to recall after provided with name and a short delay. Patient required mod-max cues to name objects present in room. Noted perseveration on 'eye glasses' and 'vision.' Given binary choice, patient answered temporal orientation questions with 100% acc. Noted visual deficit, however, patient with inconsistent reports on which side he was having difficulty seeing. He identified letters with <50% acc. He was unable to read simple words. He followed 1 step commands given 1 repetition with 100% acc. Patient took off reading glasses and requesting to lay back. RN entered room to administer medications.       Assessment:     Micheal Hunt is a 78 y.o. male with an SLP diagnosis of Aphasia, Dysphagia and Cognitive-Linguistic Impairment. ST will continue to follow.      Goals:   Multidisciplinary Problems     SLP Goals        Problem: SLP Goal    Goal Priority Disciplines Outcome   SLP Goal     SLP Ongoing (interventions implemented as appropriate)   Description:  Goals to be met 4/8  1 pt will tolerate regular diet and thin liquids/met  2 pt will participate in speech lang eval/met  3.  [place] to time and place  4.  Respond to categorization tasks with 80% accuracy  5.  Assess functional reading and writing skills  6.  Respond to problem solving tasks with 80% accuracy                         Plan:     · Patient to be seen:  4 x/week   · Plan of Care expires:  04/24/19  · Plan of Care reviewed with:  patient, friend   · SLP Follow-Up:  Yes       Discharge recommendations:  rehabilitation facility       Time Tracking:     SLP Treatment Date:   04/03/19  Speech Start Time:  0852  Speech Stop Time:  0941     Speech Total Time (min):  49 min    Billable Minutes: Speech Therapy Individual 26 and Treatment Swallowing Dysfunction 23    KRISTIN Huang, CCC-SLP   Pager: [phone]  04/03/2019

## 2019-04-03 NOTE — ASSESSMENT & PLAN NOTE
Monitoring daily, stable from yesterday. No TTP  Drop in Hgb (8.6 > 7.7), pt had blood clots passed in urine  LE U/S negative for any fluid collection, aneurysm, or stenosis    Plan:  Cont to monitor daily CBCs  Cont to monitor size

## 2019-04-03 NOTE — ASSESSMENT & PLAN NOTE
- recommend patient be re-evaluated by SLP  - recommend regular diet with nectar thick   When awake

## 2019-04-03 NOTE — PROGRESS NOTES
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  Bed mobility, transfers, and gait (I)/SV.  3/24/19:  Stroke code.  3/27/19:  Evaluated by SLP.  Found to have dysarthria and cognitive-linguistic impairment.  SLP recommendation: regular diet and thin liquids.  3/28/19:  PT and OT on hold for decline.  SLP changed recommendation to regular diet and nectar thick liquids for decline/dysphagia.   3/29/19:  Evaluated by PT.  Bed mobility max-totalA.  EOB modA-CGA.  Sit to stand deferred 2/2 LE weakness and difficulty following instructions.    3/30/19:  Evaluated by OT.  Bed mobility mod-totalA.  No functional mobility/transfers 2/2 poor sitting balance.  Grooming modA and UBD maxA while seated EOB.  LBD totalA.   4/1/19:  No PT, OT, or SLP 2/2 HD.   4/2/19:  Declined PT and OT.      Interval History 4/3/2019:  Patient is seen for follow-up rehab evaluation and recommendations: Declined PT and OT yesterday 2/2 fatigue.  HD today.  Hospital Medicine following for co-management.      HPI, Past Medical, Family, and Social History remains the same as documented in the initial encounter.    Scheduled Medications:    sodium chloride 0.9%   Intravenous Once    sodium chloride 0.9%   Intravenous Once    allopurinol  100 mg Oral Daily    atorvastatin  80 mg Oral Daily    carvedilol  25 mg Oral BID WM    ceFEPime (MAXIPIME) IVPB  1 g Intravenous Daily    epoetin kitty (PROCRIT) injection  10,000 Units Intravenous Every Mon, Wed, Fri    finasteride  5 mg Oral Daily    gabapentin  300 mg Oral QHS    insulin detemir U-100  8 Units Subcutaneous Daily    losartan  100 mg Oral QHS    pantoprazole  40 mg Oral Daily     polyethylene glycol  17 g Oral Daily    senna-docusate 8.6-50 mg  1 tablet Oral BID    tamsulosin  1 capsule Oral Daily    torsemide  20 mg Oral BID    warfarin  7.5 mg Oral Daily     PRN Medications: sodium chloride, sodium chloride 0.9%, acetaminophen, albuterol-ipratropium, dextrose 50%, dextrose 50%, glucagon (human recombinant), glucose, glucose, insulin aspart U-100, nitroGLYCERIN, ondansetron, ramelteon    Review of Systems   Constitutional: Positive for activity change and fatigue. Negative for chills and fever.   HENT: Positive for trouble swallowing and voice change. Negative for drooling and hearing loss.    Eyes: Positive for visual disturbance. Negative for pain.   Respiratory: Negative for cough, shortness of breath and wheezing.    Cardiovascular: Negative for chest pain and palpitations.   Gastrointestinal: Negative for abdominal pain, nausea and vomiting.   Genitourinary: Positive for difficulty urinating. Negative for flank pain.   Musculoskeletal: Positive for arthralgias, gait problem and myalgias. Negative for back pain and neck pain.   Skin: Positive for wound. Negative for rash.   Neurological: Positive for speech difficulty and weakness. Negative for dizziness and headaches.   Psychiatric/Behavioral: Negative for agitation and hallucinations. The patient is not nervous/anxious.      Objective:     Vital Signs (Most Recent):  Temp: 99.4 °F (37.4 °C) (04/03/19 1000)  Pulse: 69 (04/03/19 1000)  Resp: 16 (04/03/19 1000)  BP: (!) 162/86 (04/03/19 1000)  SpO2: 95 % (04/03/19 1000)    Vital Signs (24h Range):  Temp:  [98.1 °F (36.7 °C)-99.4 °F (37.4 °C)] 99.4 °F (37.4 °C)  Pulse:  [65-84] 69  Resp:  [16-18] 16  SpO2:  [93 %-99 %] 95 %  BP: (120-162)/(51-86) 162/86     Physical Exam   Constitutional: He appears well-developed and well-nourished. No distress.   HENT:   Head: Normocephalic and atraumatic.   Right Ear: External ear normal.   Left Ear: External ear normal.   Nose: Nose normal.    + dysphonia   Eyes: Right eye exhibits no discharge. Left eye exhibits no discharge. No scleral icterus.   Neck: Normal range of motion.   Cardiovascular: Normal rate and intact distal pulses.   Pulmonary/Chest: Effort normal. No respiratory distress. He has no wheezes.   Abdominal: Soft. He exhibits no distension. There is no tenderness.   Musculoskeletal: Normal range of motion. He exhibits no edema.        Right hip: He exhibits tenderness and swelling (hematoma).        Right forearm: He exhibits tenderness and swelling.   Neurological: He is alert. No sensory deficit.   -  Mental Status:  Awake and alert.  Follows commands.   -  Speech and language:  + aphasia and dysarthria.    -  Vision:  R hemianopsia.   -  Motor:  RUE: 1/5.  LUE: 5/5.  RLE: 2/5.  LLE: 5/5.   Skin: Skin is warm and dry. No rash noted.   Psychiatric: His behavior is normal. His affect is blunt. Cognition and memory are impaired.   Vitals reviewed.    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      Recommendations  -  Encourage mobility, OOB in chair, and early ambulation as appropriate   -  PT/OT evaluate and treat  -  SLP speech and cognitive evaluate and treat  -  Monitor mood and sleep disturbances  -  Establish consistent sleep-wake cycle  -  Monitor for bowel and bladder dysfunction  -  Monitor for shoulder pain and subluxation  -  Monitor for spasticity  -   DVT prophylaxis  -  Monitor for and prevent skin breakdown and pressure ulcers  · Early mobility, repositioning/weight shifting every 20-30 minutes when sitting, turn patient every 2 hours, proper mattress/overlay and chair cushioning, pressure relief/heel protector boots    Cytotoxic cerebral edema  -  2/2 stroke    Dysarthria due to acute cerebrovascular accident (CVA)  -  2/2 stroke  -  SLP following    Cellulitis of right arm  -  Hospital Medicine following for co-management--> on IV Cefepime     Paroxysmal atrial fibrillation  -  Stroke risk factor  -  Management per Vascular Neurology--> on Coumadin     ESRD (end stage renal disease) on dialysis  -  Nephrology following--> HD MWF    Patient with therapy needs.  Functionally low level.  Declined PT and OT yesterday.  Encouraged participation and reiterated the importance of therapy.  Consider starting SSRi for depressed mood.  Will follow for final post-acute recommendation.    ROSALBA Saavedra  Department of Physical Medicine & Rehab   [facility]

## 2019-04-03 NOTE — SUBJECTIVE & OBJECTIVE
Neurologic Chief Complaint: RSW + difficulty with speech    Subjective:     Interval History:   Hospital medicine consulted  Urology consulted today for evaluation of gross hematuria with clots in the setting of low platelets and need for AC for afib/stroke prevention.     Plan for dialysis today   Drowsiness today effecting mental status and exam     HPI, Past Medical, Family, and Social History remains the same as documented in the initial encounter.     Review of Systems   Constitutional: Positive for fatigue. Negative for diaphoresis and fever.   Eyes: Positive for visual disturbance.   Genitourinary: Positive for hematuria.   Neurological: Positive for facial asymmetry, speech difficulty and weakness. Negative for seizures and light-headedness.   Psychiatric/Behavioral: Positive for confusion. Negative for agitation and behavioral problems.     Scheduled Meds:   sodium chloride 0.9%   Intravenous Once    sodium chloride 0.9%   Intravenous Once    allopurinol  100 mg Oral Daily    atorvastatin  80 mg Oral Daily    carvedilol  25 mg Oral BID WM    doxycycline  100 mg Oral Q12H    epoetin kitty (PROCRIT) injection  10,000 Units Intravenous Every Mon, Wed, Fri    finasteride  5 mg Oral Daily    gabapentin  300 mg Oral QHS    insulin detemir U-100  8 Units Subcutaneous Daily    losartan  100 mg Oral QHS    pantoprazole  40 mg Oral Daily    polyethylene glycol  17 g Oral Daily    senna-docusate 8.6-50 mg  1 tablet Oral BID    tamsulosin  1 capsule Oral Daily    torsemide  20 mg Oral BID    warfarin  7.5 mg Oral Daily     Continuous Infusions:    PRN Meds:sodium chloride, sodium chloride 0.9%, acetaminophen, albuterol-ipratropium, dextrose 50%, dextrose 50%, glucagon (human recombinant), glucose, glucose, insulin aspart U-100, nitroGLYCERIN, ondansetron, ramelteon    Objective:     Vital Signs (Most Recent):  Temp: 98.4 °F (36.9 °C) (04/03/19 1145)  Pulse: 67 (04/03/19 1145)  Resp: 18 (04/03/19  1145)  BP: (!) 171/81 (04/03/19 1145)  SpO2: 95 % (04/03/19 1145)  BP Location: Right leg    Vital Signs Range (Last 24H):  Temp:  [98.1 °F (36.7 °C)-99.4 °F (37.4 °C)]   Pulse:  [64-84]   Resp:  [16-18]   BP: (120-171)/(51-86)   SpO2:  [93 %-99 %]   BP Location: Right leg    Physical Exam   Constitutional: He appears well-developed and well-nourished. He appears ill. No distress.   lethargic   HENT:   Head: Normocephalic and atraumatic.   Cardiovascular: Normal rate. An irregularly irregular rhythm present.   Pulmonary/Chest: Effort normal. No accessory muscle usage. No respiratory distress.   Genitourinary:   Genitourinary Comments: Hematoma in the R groin, extending into scrotum and pelvic region    Musculoskeletal:   Worsening edema in right upper extremity   Skin: Skin is warm. He is not diaphoretic.   Nursing note and vitals reviewed.    Neurological Exam:   LOC: very drowsy, awakes temporarily but falls back to sleep. Does follow commands when awake   Language and articulation:expressive (nods appropriately) and receptive dysarthria    Visual Fields: no blink to threat right    Decreased sensation on right   Motor: right upper extremity 0/5, RLE 2/5  Left side 4/5     Laboratory:  Recent Results (from the past 24 hour(s))   POCT glucose    Collection Time: 04/02/19 12:57 PM   Result Value Ref Range    POCT Glucose 144 (H) 70 - 110 mg/dL   Blood culture    Collection Time: 04/02/19 12:58 PM   Result Value Ref Range    Blood Culture, Routine No Growth to date    CBC auto differential    Collection Time: 04/02/19  8:52 PM   Result Value Ref Range    WBC 6.81 3.90 - 12.70 K/uL    RBC 2.41 (L) 4.60 - 6.20 M/uL    Hemoglobin 7.8 (L) 14.0 - 18.0 g/dL    Hematocrit 23.1 (L) 40.0 - 54.0 %    MCV 96 82 - 98 fL    MCH 32.4 (H) 27.0 - 31.0 pg    MCHC 33.8 32.0 - 36.0 g/dL    RDW 15.6 (H) 11.5 - 14.5 %    Platelets 98 (L) 150 - 350 K/uL    MPV 9.7 9.2 - 12.9 fL    Immature Granulocytes 1.2 (H) 0.0 - 0.5 %    Gran # (ANC)  4.7 1.8 - 7.7 K/uL    Immature Grans (Abs) 0.08 (H) 0.00 - 0.04 K/uL    Lymph # 1.4 1.0 - 4.8 K/uL    Mono # 0.6 0.3 - 1.0 K/uL    Eos # 0.1 0.0 - 0.5 K/uL    Baso # 0.02 0.00 - 0.20 K/uL    nRBC 1 (A) 0 /100 WBC    Gran% 69.2 38.0 - 73.0 %    Lymph% 19.8 18.0 - 48.0 %    Mono% 8.2 4.0 - 15.0 %    Eosinophil% 1.3 0.0 - 8.0 %    Basophil% 0.3 0.0 - 1.9 %    Differential Method Automated    POCT glucose    Collection Time: 04/02/19 10:17 PM   Result Value Ref Range    POCT Glucose 167 (H) 70 - 110 mg/dL   Comprehensive metabolic panel    Collection Time: 04/03/19  4:45 AM   Result Value Ref Range    Sodium 140 136 - 145 mmol/L    Potassium 4.3 3.5 - 5.1 mmol/L    Chloride 109 95 - 110 mmol/L    CO2 22 (L) 23 - 29 mmol/L    Glucose 101 70 - 110 mg/dL    BUN, Bld 49 (H) 8 - 23 mg/dL    Creatinine 5.6 (H) 0.5 - 1.4 mg/dL    Calcium 7.5 (L) 8.7 - 10.5 mg/dL    Total Protein 5.1 (L) 6.0 - 8.4 g/dL    Albumin 2.7 (L) 3.5 - 5.2 g/dL    Total Bilirubin 0.9 0.1 - 1.0 mg/dL    Alkaline Phosphatase 85 55 - 135 U/L    AST 17 10 - 40 U/L    ALT 23 10 - 44 U/L    Anion Gap 9 8 - 16 mmol/L    eGFR if African American 10.3 (A) >60 mL/min/1.73 m^2    eGFR if non African American 8.9 (A) >60 mL/min/1.73 m^2   Protime-INR    Collection Time: 04/03/19  4:45 AM   Result Value Ref Range    Prothrombin Time 19.9 (H) 9.0 - 12.5 sec    INR 2.0 (H) 0.8 - 1.2   CBC auto differential    Collection Time: 04/03/19  4:45 AM   Result Value Ref Range    WBC 6.13 3.90 - 12.70 K/uL    RBC 2.46 (L) 4.60 - 6.20 M/uL    Hemoglobin 7.7 (L) 14.0 - 18.0 g/dL    Hematocrit 23.4 (L) 40.0 - 54.0 %    MCV 95 82 - 98 fL    MCH 31.3 (H) 27.0 - 31.0 pg    MCHC 32.9 32.0 - 36.0 g/dL    RDW 15.6 (H) 11.5 - 14.5 %    Platelets 106 (L) 150 - 350 K/uL    MPV 9.7 9.2 - 12.9 fL    Immature Granulocytes 1.5 (H) 0.0 - 0.5 %    Gran # (ANC) 4.1 1.8 - 7.7 K/uL    Immature Grans (Abs) 0.09 (H) 0.00 - 0.04 K/uL    Lymph # 1.3 1.0 - 4.8 K/uL    Mono # 0.5 0.3 - 1.0 K/uL    Eos #  0.1 0.0 - 0.5 K/uL    Baso # 0.02 0.00 - 0.20 K/uL    nRBC 1 (A) 0 /100 WBC    Gran% 67.0 38.0 - 73.0 %    Lymph% 21.9 18.0 - 48.0 %    Mono% 7.7 4.0 - 15.0 %    Eosinophil% 1.6 0.0 - 8.0 %    Basophil% 0.3 0.0 - 1.9 %    Differential Method Automated    Vancomycin, random    Collection Time: 04/03/19  4:45 AM   Result Value Ref Range    Vancomycin, Random 13.5 Not established ug/mL   Procalcitonin    Collection Time: 04/03/19  4:45 AM   Result Value Ref Range    Procalcitonin 0.41 (H) <0.25 ng/mL   POCT glucose    Collection Time: 04/03/19  5:38 AM   Result Value Ref Range    POCT Glucose 124 (H) 70 - 110 mg/dL       Diagnostic Results     Brain Imaging   CTH non-contrast (3/28/2019):   Evolving subacute to chronic appearing infarcts in L thalamus and L PCA territory  small remote lacune in R putamen.  Cytotoxic cerebral edema    MRI Brain (3/26/19):  New acute left PCA territory distribution infarctions involving L thalamus and L paramedian occipital lobe, as well as L splenium of the corpus callosum   Generalized cerebral volume loss   significant chronic microvascular ischemic disease.      MRI Brain (3/24/19):  Vascular findings similar to CTH and CTA    Vessel Imaging   CTA-MP (3/24/19):  Extensive intracranial atherosclerotic disease:  Left PCA with a high-grade stenosis. High-grade (>70%) stenosis right proximal ICA and moderate (50-70%) stenosis left proximal ICA. Moderate to high-grade stenosis of the intra cavernous/ supraclinoid ICAs bilaterally. High-grade stenosis distal left vertebral artery.    Cardiac Imaging   TTE (3/23/19):  EF 55%, LVH  severe bilateral atrial enlargement   PA pressure  41   PFO with left to right shunting

## 2019-04-03 NOTE — NURSING
Called to room per pt's visitor stating that pt has a moderate amount of blood on pad. Noted that pt had moderate amount of bloody discharge not sure if it is stool or urine. Notified on call provider with new orders noted.

## 2019-04-03 NOTE — CONSULTS
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 male with hx of A.fib, HTN, CHF, ESRD, DM now with L PCA syndrome following stroke. He is currently on HD, prior to admission for CKD. He is currently on coumadin.     Urology consulted for concern for hematuria. History limited from patient.   Nurse stated that patient had bloody pad yesterday. Had a quintanilla present while in the ICU however this has been removed and patient is voiding spontaneously. Family member does not believe he has had gross hematuria in the past. He has never smoked.     Has seen Dr. Payne for BPH, most recently in December. At that time he was noted to have microscopic hematuria and potential workup was discussed however not completed. He has a negative urine cytology from October and has undergone multiple negative prostate biopsies in the past for elevated PSA.     Catheterized UA yesterday showed only 3 RBC and 2 WBC. CT scan from 4/1 shows enlarged prostate with unremarkable bladder, no obvious renal masses and no hydro.       Past Medical History:   Diagnosis Date    NICK (acute kidney injury) 7/29/2016    Allergy     Anemia, mild 12/15/2014    Arthritis     Gout    Benign essential HTN 3/27/2012    BMI 29.0-29.9,adult 5/10/2018    BPH (benign prostatic hyperplasia)     BPH (benign prostatic hyperplasia)     CAD (coronary artery disease) 2006    Chronic kidney disease     due to ibuprofen    Colon polyp     CRF  (chronic renal failure), stage 5     Diverticulosis     Gastritis     GERD (gastroesophageal reflux disease)     Gout     History of colon polyps 5/3/2018    HTN (hypertension) 3/27/2012    Hyperlipidemia     Hyperlipidemia     LLL pneumonia 6/14/2018    LVH (left ventricular hypertrophy)     Mesenteric ischemia     Murmur, cardiac 3/27/2012    GRICELDA (obstructive sleep apnea)     DOES NOT USE A MACHINE    Sinus problem     Syncope and collapse        Past Surgical History:   Procedure Laterality Date    APPENDECTOMY      BLOCK-NERVE Left 5/31/2016    [facility] OR    CARDIAC CATHETERIZATION      COLONOSCOPY  2011    COLONOSCOPY N/A 5/3/2018    [facility] ENDO    COLONOSCOPY N/A 9/10/2015    [facility]    CORONARY ARTERY BYPASS GRAFT  4/2007    x 1    CYSTOSCOPY N/A 8/30/2017    [facility] OR    CYSTOSCOPY N/A 11/10/2015    [facility] at [facility] OR    ESOPHAGOGASTRODUODENOSCOPY (EGD) N/A 1/4/2016    [facility]formed [facility] at [facility] (2ND F[facility])    ESOPHAGOGASTRODUODENOSCOPY (EGD) N/A 10/15/2014    Performed by [facility] at [facility] ENDO    INJECTION-STEROID-EPIDURAL-TRANSFORAMINAL Left 2/25/2016    [facility] OR    JOINT REPLACEMENT      left knee total replacement  X 3    mid leftt finger      from a cactuss    MOLE REMOVAL  2016    RADIOFREQUENCY THERMOCOAGULATION (RFTC)-NERVE-MEDIAN BRANCH-LUMBAR Left 4/11/2016    [facility] OR    rotative cuff      no rotative cuffs on bilat shoulders has pins     SHOULDER SURGERY      shoulder surgery bilat  RIGHT X 4; LEFT X 3    TRANSRECTAL ULTRASOUND GUIDED PROSTATE BIOPSY Bilateral 11/10/2015    Performed by [facility] at [facility] OR    ULTRASOUND-ENDOSCOPIC-UPPER N/A 5/31/2017    Performed [facility] at [facility] ENDO (2ND  FLR)    ULTRASOUND-ENDOSCOPIC-UPPER N/A 1/4/2016    [facility]formed [facility] at [facility] ENDO (2ND FLR)    ULTRASOUND-ENDOSCOPIC-UPPER N/A 1/16/2015    Performed by Jason Saleem [facility] at [facility] ENDO (2ND FLR)       Review of patient's allergies indicates:   Allergen Reactions    Ace inhibitors Other (See Comments)     Cough    Arb-angiotensin receptor antagonist Itching    Eplerenone Other (See Comments)     Marked bradycardia, 40, tiredness and weakness      Sulfa (sulfonamide antibiotics) Itching     Patient says this was 10 years ago and doesn't remember what happened       Family History     Problem Relation (Age of Onset)    Cancer Father    Heart disease Mother, Sister    Hypertension Brother    Pneumonia Sister    Stroke Sister    Sudden death Father          Tobacco Use    Smoking status: Never Smoker    Smokeless tobacco: Never Used   Substance and Sexual Activity    Alcohol use: No    Drug use: No    Sexual activity: Not on file       Review of Systems   Unable to perform ROS: Mental status change       Objective:     Temp:  [98.1 °F (36.7 °C)-99.4 °F (37.4 °C)] 98.4 °F (36.9 °C)  Pulse:  [64-84] 67  Resp:  [16-18] 18  SpO2:  [93 %-99 %] 95 %  BP: (120-171)/(51-86) 171/81     Body mass index is 31.01 kg/m².      Bladder Scan Volume (mL): 315 mL (04/03/19 1020)    Drains     Drain                 Hemodialysis AV Fistula Left upper arm -- days                Physical Exam   Constitutional: He appears well-developed and well-nourished. No distress.   HENT:   Head: Normocephalic and atraumatic.   Eyes: No scleral icterus.   Neck: No JVD present.   Cardiovascular: Normal rate and regular rhythm.    Pulmonary/Chest: Effort normal. No respiratory distress.   Abdominal: Soft. He exhibits no distension. There is no tenderness. There is no rebound and no guarding.   Right groin hematoma present extending to RLQ and into scrotum, soft.    Genitourinary: Right testis shows no mass and no tenderness. Left  testis shows no mass and no tenderness. Uncircumcised. No phimosis or paraphimosis.   Genitourinary Comments: Scrotal hematoma present, soft.   Skin: He is not diaphoretic. No pallor.         Significant Labs:    BMP:  Recent Labs   Lab 04/01/19  0559 04/02/19  1023 04/03/19  0445    141 140   K 4.9 4.2 4.3   * 107 109   CO2 20* 24 22*   BUN 51* 37* 49*   CREATININE 5.9* 4.8* 5.6*   CALCIUM 7.7* 8.1* 7.5*       CBC:  Recent Labs   Lab 04/02/19  1023 04/02/19  2052 04/03/19  0445   WBC 7.19 6.81 6.13   HGB 8.6* 7.8* 7.7*   HCT 25.6* 23.1* 23.4*   PLT 98* 98* 106*       Urine Studies:   Recent Labs   Lab 04/02/19  0739   COLORU Yellow   APPEARANCEUA Clear   PHUR >8.0*   SPECGRAV 1.010   PROTEINUA 2+*   GLUCUA Negative   KETONESU Negative   BILIRUBINUA Negative   OCCULTUA 1+*   NITRITE Negative   LEUKOCYTESUR Negative   RBCUA 3   WBCUA 2   BACTERIA Occasional   HYALINECASTS 0       Significant Imaging:  CT: I have reviewed all results within the past 24 hours and my personal findings are:  enlarged prostate, no renal masses, no hydro, bladder not distended      Assessment and Plan:     Hematuria  Micheal Hunt is a 78 y.o. male admitted with stroke, now with possible hematuria    - UA yesterday shows only 3 RBCs, which would be unusual in setting of gross hematuria  - Check PVR after patient is able to void  - Please call if residuals are over 300 cc  - Continue to monitor, suspect that bleeding is from prostate and will self resolve   - Patient may follow up with Dr. Payne as outpatient for discussion of hematuria workup        VTE Risk Mitigation (From admission, onward)        Ordered     warfarin tablet 7.5 mg  Daily      04/03/19 0923     IP VTE HIGH RISK PATIENT  Once      03/22/19 2207          Thank you for your consult. I will sign off. Please contact us if you have any additional questions.    Lesley Terrazas MD  Urology  [facility]

## 2019-04-03 NOTE — PROGRESS NOTES
Patient arrived on unit via bed. Dialysis tx inititaed via left arm AVF with 15g needles x 2 without difficulty. Orders and machine settings verified. Tx started.    Maintenance dialysis

## 2019-04-03 NOTE — ASSESSMENT & PLAN NOTE
Within the last 3 days from 8.2 >7.9 >7.1    - Discontinued heparin gtt, now coumadin   - will transfuse with pRBC x1 (consent obtained from daughter Tonya Hunt via phone)  - continue to monitor

## 2019-04-03 NOTE — ASSESSMENT & PLAN NOTE
Cont flomax and finasteride  Has been making urine and several episodes of incontinence, documented unmeasured urine per nursing. Frequency of urination has decreased as PO intake has decreased but pt is still making urine and soaking the pad  UA negative for infection  Noted bloody clots passed with urine per nursing. Urology consulted, appreciate assistance  Cont to monitor

## 2019-04-03 NOTE — ASSESSMENT & PLAN NOTE
Risk factor for stroke   CHADsVASc 7    - Carvedilol for rate control   - Heparin gtt discontinued due to drop in Hgb (8.2 > 7.7 > 7.1)  - coumadin 5mg  , therapeutic today   -pharmacy recommending 7.5 mg tonight

## 2019-04-03 NOTE — ASSESSMENT & PLAN NOTE
Due to ESRD   receives EPO every 2 weeks   H/h continues to trend down.  Blood transfusion 03/31 1unit  Stable currently - also with hematuria now

## 2019-04-03 NOTE — ASSESSMENT & PLAN NOTE
With clots   Noted overnight   Complicated by low platelets, need for anticoagulation  Urology consulted   q6 hour bladder scans for retention

## 2019-04-03 NOTE — PROGRESS NOTES
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 Fluctuating neurological examination with RUE weakness, dysarthria and aphasia.  New stroke seen on repeat imaging.  He has significant intracranial/extrancranial atherosclerotic disease seen on CTA head/neck. Etiology cardioembolic versus atheroembolic.      - Anticoagulants: Coumadin daily adjusting for INR goal of 2.0-3.0   - Statins: Atorvastatin- 80 mg daily  -Diagnostic studies pending: INR and cultures pending   - Aggressive risk factor modification: HTN, DM, HLD, A-Fib, CAD, intracranial atherosclerosis   - Rehab efforts: PT/OT to evaluate and treat ->recommending Rehab, SLP -> recommending Regular diet/nectar thick  - VTE prophylaxis: anticoagulated  - BP parameters: target sBP<160      Umbilical hernia  + abdominal pain during hospitalization  CT scan ordered showed umbilical hernia  General surgery consulted and recommended outpatient follow up     Cytotoxic cerebral edema  Large area of cytotoxic cerebral edema identified when reviewing brain imaging in the territory of the left posterior cerebral artery. There is mass effect associated with it. We will continue to monitor the patients clinical exam for any worsening of symptoms which may indicate expansion of the stroke or the area of the edema resulting in the clinical change. The pattern is suggestive of atheroembolic etiology.        Hemoglobin drop  Within the last 3 days from 8.2 >7.9 >7.1    - Discontinued heparin gtt, now coumadin   - will transfuse with pRBC x1 (consent obtained from daughter Tonya Hunt via phone)  - continue to monitor     Groin hematoma  Monitoring daily,continue to worsen  H/h decreasing Discontinued Heparin gtt  Stat right femoral arterial ultrasound  ordered and negative for vasculature abnormalities     Dysarthria due to acute cerebrovascular accident (CVA)  - recommend patient be re-evaluated by SLP  - recommend regular diet with nectar thick   When awake     Cellulitis of right arm  R forearm edema   Arterial line removed, evaluated by ortho for compartment syndrome which was negative as well as US  Stated on Clindamycin 300 q6 for possible cellulitis in the Neuro ICU (3/30), will continue x5 days  04/02 Edema worsening started IV vancomycin and cefepime.  Vanc one dose given today and vanc level will be drawn one hour tomorrow before dialysis is completed.    Defer to pharmacy after to decide what dosing to give.      Patent foramen ovale  Risk factor for stroke  Seen on echocardiogram     Paroxysmal atrial fibrillation  Risk factor for stroke   CHADsVASc 7    - Carvedilol for rate control   - Heparin gtt discontinued due to drop in Hgb (8.2 > 7.7 > 7.1)  - coumadin 5mg  , therapeutic today   -pharmacy recommending 7.5 mg tonight     ESRD (end stage renal disease) on dialysis  Patient is being followed by nephrology, appreciate their assistance  Continue scheduled HD, MWF        Type 2 diabetes mellitus, without long-term current use of insulin  Poorly controlled, A1C 8.3  Current glucose running ~100-150  Detemir 8 units daily  MD SSI  POCt glucose q6 hr    Long term (current) use of anticoagulants  - coumadin daily  INR 2 today     Benign prostatic hyperplasia without lower urinary tract symptoms  H/o and CT showed enlarged prostate   - continue flomax and finasteride  -has been making urine and several episodes of incontinence per sister in law but has not urinated since yesterday  -u/a negative, bladder scan   - hematuria today with clots - urology consulted   -q 6 hour bladder scans     Anemia of chronic disease  Due to ESRD   receives EPO every 2 weeks   H/h continues to trend down.  Blood transfusion 03/31 1unit  Stable currently - also with hematuria  now      Gout, arthritis  - continue allopurinol    CAD (coronary artery disease), 2V CABG 2007  Risk factor for stroke  Continue statin.   On coumadin daily         Hyperlipidemia  LDL 46  Continue atorvastatin 80 mg due to extensive intracranial atherosclerotic disease      Essential hypertension  Risk factor for stroke   SBP<160  continue Torsemide 20 mg q12, losartan 100 mg qd, and Carvedilol 25 mg q12        Carotid artery stenosis  Noted CTA and carotid US  Patient evaluated by vascular surgery, recommended maximal medical management    Hematuria  With clots   Noted overnight   Complicated by low platelets, need for anticoagulation  Urology consulted   q6 hour bladder scans for retention          Admitted to hospital medicine for unstable angina eval, vascular neurology consulted for right sided weakness, facial droop and dysarthria, MRI showed L PCA infarction, not candidate for TPA ( symptoms duration >4 hours)patient symptoms improved with laying flat, admitted to neuro critical care. Patient with known PCA infarct on L with undulating symptom pattern with modification to level of recumbency. Patient restarted on heparin gtt. Underwent US of LE and of carotid with evidence of <50% L ICA stenosis and 60-70% R ICA stenosis, heavily calcified on CTA, additionally L vertebral also heavily calcified. Patient tolerated HD. Neuro deficit appear to be more severe on 3/27 with decreasing right arm strength now 2/5 and with worsening dysarthria/aphasia. Patient with gross swelling of R forearm - tense, pulses intact.   03/29/2019 patient with worsening of neurological deficit, unable to move right upper extremity, patient with pain on passive movement of right fingers exhibited by facial grimace as verbal response to pain/sensation/light touch no longer reliable, aphasia/dysarthria worsening, RLE with 2/5 strength today, patient evaluated by orthopedic surgery regarding R forearm yesterday and today and feel that  this is not compartment syndrome and will continue to monitor, distal pulses remain palpable  3/30: NAEON. On heparin gtt. Plans to step down  3/31: stepped down to VN without major events. transfused with pRBC x1 due to drop in Hbg. Discontinued heparin gtt. INR increased to 1.2, increased warfarin to 7.5  04/01/19 Visited patient in dialysis today.  On coumadin and atorvastatin for secondary stroke prevention H/H stabilized at 7.6 after 1 unit pRBC transfusion on 03/31.  Epo given during dialysis.  Patient has worsening right groin hematoma.  Stat right femoral artery u/s ordered.  On clindamycin for suspected right arm cellulitis.  Sister-in-law concern for urination.  Patient was incontinent and making urine but today has not had any urination.  U/A and bladder scan ordered   04/02/19 hospital medicine consulted for multiple medical issues. Right arm edema worsening.  Switching antibiotics from oral to IV     4/3/19 - drowsy today, less interactive. Plan for dialysis today. Noted to have gross hematuria with clots , bladder scan 315.     STROKE DOCUMENTATION        NIH Scale:  1a. Level of Consciousness: 1-->Not alert, but arousable by minor stimulation to obey, answer, or respond  1b. LOC Questions: 2-->Answers neither question correctly  1c. LOC Commands: 0-->Performs both tasks correctly  2. Best Gaze: 0-->Normal  3. Visual: 2-->Complete hemianopia  4. Facial Palsy: 0-->Normal symmetrical movements  5a. Motor Arm, Left: 1-->Drift, limb holds 90 (or 45) degrees, but drifts down before full 10 seconds, does not hit bed or other support  5b. Motor Arm, Right: 3-->No effort against gravity, limb falls  6a. Motor Leg, Left: 1-->Drift, leg falls by the end of the 5-sec period but does not hit bed  6b. Motor Leg, Right: 3-->No effort against gravity, leg falls to bed immediately  7. Limb Ataxia: 0-->Absent  8. Sensory: 1-->Mild-to-moderate sensory loss, patient feels pinprick is less sharp or is dull on the affected  side, or there is a loss of superficial pain with pinprick, but patient is aware of being touched  9. Best Language: 1-->Mild-to-moderate aphasia, some obvious loss of fluency or facility of comprehension, without significant limitation on ideas expressed or form of expression. Reduction of speech and/or comprehension, however, makes conversation. . . (see row details)  10. Dysarthria: 1-->Mild-to-moderate dysarthria, patient slurs at least some words and, at worst, can be understood with some difficulty  11. Extinction and Inattention (formerly Neglect): 0-->No abnormality  Total (NIH Stroke Scale): 16       Modified [place]    B[place]get[place] Coma Scale:    ABCD2 Score:    [id number] Score:8  HAS -BLED Score:   ICH Score:   Hunt & Sharp Classification:      Hemorrhagic change of an Ischemic Stroke: Does this patient have an ischemic stroke with hemorrhagic changes? No     Neurologic Chief Complaint: RSW + difficulty with speech    Subjective:     Interval History:   Hospital medicine consulted  Urology consulted today for evaluation of gross hematuria with clots in the setting of low platelets and need for AC for afib/stroke prevention.     Plan for dialysis today   Drowsiness today effecting mental status and exam     HPI, Past Medical, Family, and Social History remains the same as documented in the initial encounter.     Review of Systems   Constitutional: Positive for fatigue. Negative for diaphoresis and fever.   Eyes: Positive for visual disturbance.   Genitourinary: Positive for hematuria.   Neurological: Positive for facial asymmetry, speech difficulty and weakness. Negative for seizures and light-headedness.   Psychiatric/Behavioral: Positive for confusion. Negative for agitation and behavioral problems.     Scheduled Meds:   sodium chloride 0.9%   Intravenous Once    sodium chloride 0.9%   Intravenous Once    allopurinol  100 mg Oral Daily    atorvastatin  80 mg Oral Daily    carvedilol  25 mg Oral BID WM     doxycycline  100 mg Oral Q12H    epoetin kitty (PROCRIT) injection  10,000 Units Intravenous Every Mon, Wed, Fri    finasteride  5 mg Oral Daily    gabapentin  300 mg Oral QHS    insulin detemir U-100  8 Units Subcutaneous Daily    losartan  100 mg Oral QHS    pantoprazole  40 mg Oral Daily    polyethylene glycol  17 g Oral Daily    senna-docusate 8.6-50 mg  1 tablet Oral BID    tamsulosin  1 capsule Oral Daily    torsemide  20 mg Oral BID    warfarin  7.5 mg Oral Daily     Continuous Infusions:    PRN Meds:sodium chloride, sodium chloride 0.9%, acetaminophen, albuterol-ipratropium, dextrose 50%, dextrose 50%, glucagon (human recombinant), glucose, glucose, insulin aspart U-100, nitroGLYCERIN, ondansetron, ramelteon    Objective:     Vital Signs (Most Recent):  Temp: 98.4 °F (36.9 °C) (04/03/19 1145)  Pulse: 67 (04/03/19 1145)  Resp: 18 (04/03/19 1145)  BP: (!) 171/81 (04/03/19 1145)  SpO2: 95 % (04/03/19 1145)  BP Location: Right leg    Vital Signs Range (Last 24H):  Temp:  [98.1 °F (36.7 °C)-99.4 °F (37.4 °C)]   Pulse:  [64-84]   Resp:  [16-18]   BP: (120-171)/(51-86)   SpO2:  [93 %-99 %]   BP Location: Right leg    Physical Exam   Constitutional: He appears well-developed and well-nourished. He appears ill. No distress.   lethargic   HENT:   Head: Normocephalic and atraumatic.   Cardiovascular: Normal rate. An irregularly irregular rhythm present.   Pulmonary/Chest: Effort normal. No accessory muscle usage. No respiratory distress.   Genitourinary:   Genitourinary Comments: Hematoma in the R groin, extending into scrotum and pelvic region    Musculoskeletal:   Worsening edema in right upper extremity   Skin: Skin is warm. He is not diaphoretic.   Nursing note and vitals reviewed.    Neurological Exam:   LOC: very drowsy, awakes temporarily but falls back to sleep. Does follow commands when awake   Language and articulation:expressive (nods appropriately) and receptive dysarthria    Visual Fields: no  blink to threat right    Decreased sensation on right   Motor: right upper extremity 0/5, RLE 2/5  Left side 4/5     Laboratory:  Recent Results (from the past 24 hour(s))   POCT glucose    Collection Time: 04/02/19 12:57 PM   Result Value Ref Range    POCT Glucose 144 (H) 70 - 110 mg/dL   Blood culture    Collection Time: 04/02/19 12:58 PM   Result Value Ref Range    Blood Culture, Routine No Growth to date    CBC auto differential    Collection Time: 04/02/19  8:52 PM   Result Value Ref Range    WBC 6.81 3.90 - 12.70 K/uL    RBC 2.41 (L) 4.60 - 6.20 M/uL    Hemoglobin 7.8 (L) 14.0 - 18.0 g/dL    Hematocrit 23.1 (L) 40.0 - 54.0 %    MCV 96 82 - 98 fL    MCH 32.4 (H) 27.0 - 31.0 pg    MCHC 33.8 32.0 - 36.0 g/dL    RDW 15.6 (H) 11.5 - 14.5 %    Platelets 98 (L) 150 - 350 K/uL    MPV 9.7 9.2 - 12.9 fL    Immature Granulocytes 1.2 (H) 0.0 - 0.5 %    Gran # (ANC) 4.7 1.8 - 7.7 K/uL    Immature Grans (Abs) 0.08 (H) 0.00 - 0.04 K/uL    Lymph # 1.4 1.0 - 4.8 K/uL    Mono # 0.6 0.3 - 1.0 K/uL    Eos # 0.1 0.0 - 0.5 K/uL    Baso # 0.02 0.00 - 0.20 K/uL    nRBC 1 (A) 0 /100 WBC    Gran% 69.2 38.0 - 73.0 %    Lymph% 19.8 18.0 - 48.0 %    Mono% 8.2 4.0 - 15.0 %    Eosinophil% 1.3 0.0 - 8.0 %    Basophil% 0.3 0.0 - 1.9 %    Differential Method Automated    POCT glucose    Collection Time: 04/02/19 10:17 PM   Result Value Ref Range    POCT Glucose 167 (H) 70 - 110 mg/dL   Comprehensive metabolic panel    Collection Time: 04/03/19  4:45 AM   Result Value Ref Range    Sodium 140 136 - 145 mmol/L    Potassium 4.3 3.5 - 5.1 mmol/L    Chloride 109 95 - 110 mmol/L    CO2 22 (L) 23 - 29 mmol/L    Glucose 101 70 - 110 mg/dL    BUN, Bld 49 (H) 8 - 23 mg/dL    Creatinine 5.6 (H) 0.5 - 1.4 mg/dL    Calcium 7.5 (L) 8.7 - 10.5 mg/dL    Total Protein 5.1 (L) 6.0 - 8.4 g/dL    Albumin 2.7 (L) 3.5 - 5.2 g/dL    Total Bilirubin 0.9 0.1 - 1.0 mg/dL    Alkaline Phosphatase 85 55 - 135 U/L    AST 17 10 - 40 U/L    ALT 23 10 - 44 U/L    Anion Gap 9 8  - 16 mmol/L    eGFR if African American 10.3 (A) >60 mL/min/1.73 m^2    eGFR if non African American 8.9 (A) >60 mL/min/1.73 m^2   Protime-INR    Collection Time: 04/03/19  4:45 AM   Result Value Ref Range    Prothrombin Time 19.9 (H) 9.0 - 12.5 sec    INR 2.0 (H) 0.8 - 1.2   CBC auto differential    Collection Time: 04/03/19  4:45 AM   Result Value Ref Range    WBC 6.13 3.90 - 12.70 K/uL    RBC 2.46 (L) 4.60 - 6.20 M/uL    Hemoglobin 7.7 (L) 14.0 - 18.0 g/dL    Hematocrit 23.4 (L) 40.0 - 54.0 %    MCV 95 82 - 98 fL    MCH 31.3 (H) 27.0 - 31.0 pg    MCHC 32.9 32.0 - 36.0 g/dL    RDW 15.6 (H) 11.5 - 14.5 %    Platelets 106 (L) 150 - 350 K/uL    MPV 9.7 9.2 - 12.9 fL    Immature Granulocytes 1.5 (H) 0.0 - 0.5 %    Gran # (ANC) 4.1 1.8 - 7.7 K/uL    Immature Grans (Abs) 0.09 (H) 0.00 - 0.04 K/uL    Lymph # 1.3 1.0 - 4.8 K/uL    Mono # 0.5 0.3 - 1.0 K/uL    Eos # 0.1 0.0 - 0.5 K/uL    Baso # 0.02 0.00 - 0.20 K/uL    nRBC 1 (A) 0 /100 WBC    Gran% 67.0 38.0 - 73.0 %    Lymph% 21.9 18.0 - 48.0 %    Mono% 7.7 4.0 - 15.0 %    Eosinophil% 1.6 0.0 - 8.0 %    Basophil% 0.3 0.0 - 1.9 %    Differential Method Automated    Vancomycin, random    Collection Time: 04/03/19  4:45 AM   Result Value Ref Range    Vancomycin, Random 13.5 Not established ug/mL   Procalcitonin    Collection Time: 04/03/19  4:45 AM   Result Value Ref Range    Procalcitonin 0.41 (H) <0.25 ng/mL   POCT glucose    Collection Time: 04/03/19  5:38 AM   Result Value Ref Range    POCT Glucose 124 (H) 70 - 110 mg/dL       Diagnostic Results     Brain Imaging   CTH non-contrast (3/28/2019):   Evolving subacute to chronic appearing infarcts in L thalamus and L PCA territory  small remote lacune in R putamen.  Cytotoxic cerebral edema    MRI Brain (3/26/19):  New acute left PCA territory distribution infarctions involving L thalamus and L paramedian occipital lobe, as well as L splenium of the corpus callosum   Generalized cerebral volume loss   significant chronic  microvascular ischemic disease.      MRI Brain (3/24/19):  Vascular findings similar to CTH and CTA    Vessel Imaging   CTA-MP (3/24/19):  Extensive intracranial atherosclerotic disease:  Left PCA with a high-grade stenosis. High-grade (>70%) stenosis right proximal ICA and moderate (50-70%) stenosis left proximal ICA. Moderate to high-grade stenosis of the intra cavernous/ supraclinoid ICAs bilaterally. High-grade stenosis distal left vertebral artery.    Cardiac Imaging   TTE (3/23/19):  EF 55%, LVH  severe bilateral atrial enlargement   PA pressure  41   PFO with left to right shunting        NANCY Henderson  Comprehensive Stroke Center  Department of Vascular Neurology   [facility]

## 2019-04-03 NOTE — SUBJECTIVE & OBJECTIVE
Past Medical History:   Diagnosis Date    NICK (acute kidney injury) 7/29/2016    Allergy     Anemia, mild 12/15/2014    Arthritis     Gout    Benign essential HTN 3/27/2012    BMI 29.0-29.9,adult 5/10/2018    BPH (benign prostatic hyperplasia)     BPH (benign prostatic hyperplasia)     CAD (coronary artery disease) 2006    Chronic kidney disease     due to ibuprofen    Colon polyp     CRF (chronic renal failure), stage 5     Diverticulosis     Gastritis     GERD (gastroesophageal reflux disease)     Gout     History of colon polyps 5/3/2018    HTN (hypertension) 3/27/2012    Hyperlipidemia     Hyperlipidemia     LLL pneumonia 6/14/2018    LVH (left ventricular hypertrophy)     Mesenteric ischemia     Murmur, cardiac 3/27/2012    GRICELDA (obstructive sleep apnea)     DOES NOT USE A MACHINE    Sinus problem     Syncope and collapse        Past Surgical History:   Procedure Laterality Date    APPENDECTOMY      BLOCK-NERVE Left 5/31/2016    [facility] at [facility]    CARDIAC CATHETERIZATION      COLONOSCOPY  2011    COLONOSCOPY N/A 5/3/2018    [facility] at [facility] ENDO    COLONOSCOPY N/A 9/10/2015    [facility] at [facility] ENDO    CORONARY ARTERY BYPASS GRAFT  4/2007    x 1    CYSTOSCOPY N/A 8/30/2017    [facility] at [facility]    CYSTOSCOPY N/A 11/10/2015    [facility] at [facility] OR    ESOPHAGOGASTRODUODENOSCOPY (EGD) N/A 1/4/2016    Performed by Tra [facility] at [facility] ENDO (2ND FLR)    ESOPHAGOGASTRODUODENOSCOPY (EGD) N/A 10/15/2014    Performed by [facility]i [facility] at [facility] ENDO    INJECTION-STEROID-EPIDURAL-TRANSFORAMINAL Left 2/25/2016    [facility]    JOINT REPLACEMENT      left knee total replacement  X 3    mid leftt finger      from a cactuss    MOLE REMOVAL  2016    RADIOFREQUENCY THERMOCOAGULATION (RFTC)-NERVE-MEDIAN BRANCH-LUMBAR Left  4/11/2016    [facility] OR    rotative cuff      no rotative cuffs on bilat shoulders has pins     SHOULDER SURGERY      shoulder surgery bilat  RIGHT X 4; LEFT X 3    TRANSRECTAL ULTRASOUND GUIDED PROSTATE BIOPSY Bilateral 11/10/2015    [facility] OR    ULTRASOUND-ENDOSCOPIC-UPPER N/A 5/31/2017    [facility] at [facility] ENDO (2ND FLR)    ULTRASOUND-ENDOSCOPIC-UPPER N/A 1/4/2016    Performed by Tra [facility] at [facility] ENDO (2ND FLR)    ULTRASOUND-ENDOSCOPIC-UPPER N/A 1/16/2015    Performed by Jason [facility]em MD at [facility] ENDO (2ND FLR)       Review of patient's allergies indicates:   Allergen Reactions    Ace inhibitors Other (See Comments)     Cough    Arb-angiotensin receptor antagonist Itching    Eplerenone Other (See Comments)     Marked bradycardia, 40, tiredness and weakness      Sulfa (sulfonamide antibiotics) Itching     Patient says this was 10 years ago and doesn't remember what happened       No current facility-administered medications on file prior to encounter.      Current Outpatient Medications on File Prior to Encounter   Medication Sig    allopurinol (ZYLOPRIM) 100 MG tablet Take 100 mg by mouth once daily.    amLODIPine (NORVASC) 10 MG tablet Take 10 mg by mouth once daily.    aspirin (ECOTRIN) 81 MG EC tablet Take 81 mg by mouth once daily.      atorvastatin (LIPITOR) 80 MG tablet TAKE 1 TABLET (80 MG TOTAL) BY MOUTH ONCE DAILY.    budesonide-formoterol 160-4.5 mcg (SYMBICORT) 160-4.5 mcg/actuation HFAA Inhale 2 puffs into the lungs every 12 (twelve) hours. Controller    carvedilol (COREG) 12.5 MG tablet Take 12.5 mg by mouth 2 (two) times daily with meals.    celecoxib (CELEBREX) 200 MG capsule Take 1 capsule (200 mg total) by mouth once daily.    finasteride (PROSCAR) 5 mg tablet TAKE 1 TABLET ONCE DAILY.    fluticasone (FLONASE) 50 mcg/actuation nasal spray 2 sprays (100 mcg total) by Each Nare route  once daily.    gabapentin (NEURONTIN) 300 MG capsule Take 1 capsule (300 mg total) by mouth every evening.    isosorbide mononitrate (ISMO,MONOKET) 10 mg tablet TAKE 1 TABLET BY MOUTH EVERY DAY IN THE EVENING    meclizine (ANTIVERT) 25 mg tablet Take 1 tablet (25 mg total) by mouth 3 (three) times daily as needed for Dizziness.    mirtazapine (REMERON) 7.5 MG Tab TAKE 1 TABLET BY MOUTH EVERY EVENING.    montelukast (SINGULAIR) 10 mg tablet Take 10 mg by mouth every evening.    NITROSTAT 0.4 mg SL tablet PLACE 1 TABLET (0.4 MG TOTAL) UNDER THE TONGUE EVERY 5 (FIVE) MINUTES AS NEEDED FOR CHEST PAIN.    omeprazole (PRILOSEC) 20 MG capsule TAKE 1 CAPSULE BY MOUTH EVERY DAY    tamsulosin (FLOMAX) 0.4 mg Cap TAKE 1 CAPSULE BY MOUTH EVERY DAY    tiotropium bromide (SPIRIVA RESPIMAT) 1.25 mcg/actuation Mist Inhale 2.5 mcg into the lungs once daily. Controller    torsemide (DEMADEX) 20 MG Tab Take 1 tablet (20 mg total) by mouth 2 (two) times daily.    warfarin (COUMADIN) 7.5 MG tablet Take 1 tablet (7.5 mg total) by mouth Daily.    zolpidem (AMBIEN) 5 MG Tab TAKE 1 TABLET BY MOUTH EVERY EVENING AS NEEDED    albuterol (PROVENTIL/VENTOLIN HFA) 90 mcg/actuation inhaler 2 puffs every 4 hours as needed for cough, wheeze, or shortness of breath    albuterol-ipratropium (DUO-NEB) 2.5 mg-0.5 mg/3 mL nebulizer solution Take 3 mLs by nebulization every 6 (six) hours as needed for Wheezing or Shortness of Breath.     Family History     Problem Relation (Age of Onset)    Cancer Father    Heart disease Mother, Sister    Hypertension Brother    Pneumonia Sister    Stroke Sister    Sudden death Father        Tobacco Use    Smoking status: Never Smoker    Smokeless tobacco: Never Used   Substance and Sexual Activity    Alcohol use: No    Drug use: No    Sexual activity: Not on file     Review of Systems   Constitutional: Negative for fatigue and fever.   HENT: Negative for congestion and rhinorrhea.    Eyes: Negative for  pain and visual disturbance.   Respiratory: Negative for cough, shortness of breath and wheezing.    Cardiovascular: Negative for chest pain, palpitations and leg swelling.   Gastrointestinal: Negative for abdominal pain, nausea and vomiting.   Genitourinary: Negative for difficulty urinating, dysuria and hematuria.   Musculoskeletal: Negative for arthralgias and back pain.   Skin: Positive for color change. Negative for pallor.   Neurological: Positive for facial asymmetry, speech difficulty, weakness (R sided) and numbness. Negative for dizziness and headaches.   Hematological: Negative for adenopathy. Bruises/bleeds easily.   Psychiatric/Behavioral: Positive for confusion and decreased concentration. Negative for agitation. The patient is not nervous/anxious.      Objective:     Vital Signs (Most Recent):  Temp: 99.4 °F (37.4 °C) (04/03/19 1000)  Pulse: 64 (04/03/19 1100)  Resp: 16 (04/03/19 1000)  BP: (!) 162/86 (04/03/19 1000)  SpO2: 95 % (04/03/19 1000) Vital Signs (24h Range):  Temp:  [98.1 °F (36.7 °C)-99.4 °F (37.4 °C)] 99.4 °F (37.4 °C)  Pulse:  [64-84] 64  Resp:  [16-18] 16  SpO2:  [93 %-99 %] 95 %  BP: (120-162)/(51-86) 162/86     Weight: 106.6 kg (235 lb 0.2 oz)  Body mass index is 31.01 kg/m².    Physical Exam   Constitutional: He appears well-developed and well-nourished. No distress.   HENT:   Head: Normocephalic and atraumatic.   Mouth/Throat: No oropharyngeal exudate.   Eyes: Pupils are equal, round, and reactive to light. EOM are normal. Right eye exhibits no discharge. Left eye exhibits no discharge.   Neck: Normal range of motion. Neck supple.   Cardiovascular: Normal rate and intact distal pulses.   No murmur heard.  Pulmonary/Chest: Effort normal and breath sounds normal. No stridor. No respiratory distress. He has no wheezes. He exhibits no tenderness.   Abdominal: Soft. Bowel sounds are normal. There is no tenderness. There is no guarding.   Musculoskeletal: He exhibits no edema or  tenderness.   Neurological: He is alert.   Follows commands. Mostly nods to yes or no questions. Aphasic, dysarthria   Skin: Skin is warm and dry. He is not diaphoretic. There is erythema.   R groin hematoma extending to scrotum/pelvis, RUE and R foot bruising. Mild erythema to R forearm near wrist with vesicle, no TTP, no drainage --stable from yesterday   Psychiatric: He has a normal mood and affect. His behavior is normal.   Vitals reviewed.      Significant Labs:   CBC:   Recent Labs   Lab 04/02/19  1023 04/02/19 2052 04/03/19  0445   WBC 7.19 6.81 6.13   HGB 8.6* 7.8* 7.7*   HCT 25.6* 23.1* 23.4*   PLT 98* 98* 106*     CMP:   Recent Labs   Lab 04/02/19  1023 04/03/19  0445    140   K 4.2 4.3    109   CO2 24 22*   * 101   BUN 37* 49*   CREATININE 4.8* 5.6*   CALCIUM 8.1* 7.5*   PROT 5.4* 5.1*   ALBUMIN 2.8* 2.7*   BILITOT 0.8 0.9   ALKPHOS 93 85   AST 21 17   ALT 29 23   ANIONGAP 10 9   EGFRNONAA 10.7* 8.9*       Significant Imaging: I have reviewed all pertinent imaging results/findings within the past 24 hours.

## 2019-04-03 NOTE — HPI
Micheal Hunt is a 78 y.o. male with hx of A.fib, HTN, CHF, ESRD, DM now with L PCA syndrome following stroke. He is currently on HD, prior to admission for CKD. He is currently on coumadin.     Urology consulted for concern for hematuria. History limited from patient.   Nurse stated that patient had bloody pad yesterday. Had a quintanilla present while in the ICU however this has been removed and patient is voiding spontaneously. Family member does not believe he has had gross hematuria in the past. He has never smoked.     Has seen Dr. Payne for BPH, most recently in December. At that time he was noted to have microscopic hematuria and potential workup was discussed however not completed. He has a negative urine cytology from October and has undergone multiple negative prostate biopsies in the past for elevated PSA.     Catheterized UA yesterday showed only 3 RBC and 2 WBC. CT scan from 4/1 shows enlarged prostate with unremarkable bladder, no obvious renal masses and no hydro.

## 2019-04-03 NOTE — PLAN OF CARE
Problem: Adult Inpatient Plan of Care  Goal: Plan of Care Review  Past Medical History:  7/29/2016: NICK (acute kidney injury)  No date: Allergy  12/15/2014: Anemia, mild  No date: Arthritis      Comment:  Gout  3/27/2012: Benign essential HTN  5/10/2018: BMI 29.0-29.9,adult  No date: BPH (benign prostatic hyperplasia)  No date: BPH (benign prostatic hyperplasia)  2006: CAD (coronary artery disease)  No date: Chronic kidney disease      Comment:  due to ibuprofen  No date: Colon polyp  No date: CRF (chronic renal failure), stage 5  No date: Diverticulosis  No date: Gastritis  No date: GERD (gastroesophageal reflux disease)  No date: Gout  5/3/2018: History of colon polyps  3/27/2012: HTN (hypertension)  No date: Hyperlipidemia  No date: Hyperlipidemia  6/14/2018: LLL pneumonia  No date: LVH (left ventricular hypertrophy)  No date: Mesenteric ischemia  3/27/2012: Murmur, cardiac  No date: GRICELDA (obstructive sleep apnea)      Comment:  DOES NOT USE A MACHINE  No date: Sinus problem  No date: Syncope and collapse      Past Surgical History:  No date: APPENDECTOMY  5/31/2016: BLOCK-NERVE; Left      Comment:  Performed by [facility] at [facility] OR  No date: CARDIAC CATHETERIZATION  2011: COLONOSCOPY  5/3/2018: COLONOSCOPY; N/A      Comment:  Performed by Messi Harris MD at [facility] ENDO  9/10/2015: COLONOSCOPY; N/A      Comment:  Performed by Messi Harris MD at [facility] ENDO  4/2007: CORONARY ARTERY BYPASS GRAFT      Comment:  x 1  8/30/2017: CYSTOSCOPY; N/A      Comment:  Performed by Rudy Herring MD at [facility] OR  11/10/2015: CYSTOSCOPY; N/A      Comment:  Performed by Rudy Herring MD at [facility] OR  1/4/2016: ESOPHAGOGASTRODUODENOSCOPY (EGD); N/A      Comment:  Performed by Tra [facility] MD at [facility] ENDO (2ND FLR)  10/15/2014: ESOPHAGOGASTRODUODENOSCOPY (EGD); N/A      Comment:  Performed by Messi Harris MD at [facility] ENDO  2/25/2016: INJECTION-STEROID-EPIDURAL-TRANSFORAMINAL; Left      Comment:  Performed by  Kevin Leon MD at [facility] OR  No date: JOINT REPLACEMENT      Comment:  left knee total replacement  X 3  No date: mid leftt finger      Comment:  from a cactelkin  2016: MOLE REMOVAL  4/11/2016: RADIOFREQUENCY THERMOCOAGULATION (RFTC)-NERVE-MEDIAN   BRANCH-LUMBAR; Left      Comment:  Performed by Kevin Leon MD at [facility] OR  No date: rotative cuff      Comment:  no rotative cuffs on bilat shoulders has pins   No date: SHOULDER SURGERY      Comment:  shoulder surgery bilat  RIGHT X 4; LEFT X 3  11/10/2015: TRANSRECTAL ULTRASOUND GUIDED PROSTATE BIOPSY; Bilateral      Comment:  Performed by Rudy Herring MD at [facility] OR  5/31/2017: ULTRASOUND-ENDOSCOPIC-UPPER; N/A      Comment:  Performed by Tra Brooks MD at [facility] ENDO (2ND FLR)  1/4/2016: ULTRASOUND-ENDOSCOPIC-UPPER; N/A      Comment:  Performed by Tra Brooks MD at [facility] ENDO (2ND FLR)  1/16/2015: ULTRASOUND-ENDOSCOPIC-UPPER; N/A      Comment:  Performed by Jason Saleem MD at [facility] ENDO (2ND FLR)    Patient likes blankets on.    poc reviewed with pt. Pt nodded understanding. AAox1 (person). VSS. NAD noted. No acute events over shift. Pt passed blood clots in urine twice, stroke team notified. Right arm elevated above heart. Turn q2. Bed low, call light in reach. Will continue to monitor.

## 2019-04-03 NOTE — PROGRESS NOTES
[facility] STAFF HEMODIALYSIS NOTE     Patient currently on hemodialysis for removal of uremic toxins and volume.     Patient seen and evaluated on hemodialysis, tolerating treatment, see HD flowsheet for vitals and assessments.      Ultrafiltration goal is 0     Labs have been reviewed and the dialysate bath has been adjusted.     Assessment/Plan:  Seen on dialysis this afternoon, tolerating well.  Good renal residual function, no net ultrafiltration today.  Continue Torsemide  Continue strict I/O's  Bladder scans to eval for urinary retention    Anemia of ESRD  Continue NIC with dialysis  Iron panel    BMM  Phos levels daily  Renal diet    ALLISON Suresh, FNP-BC  Nephrology  Pager:  [phone]

## 2019-04-03 NOTE — ASSESSMENT & PLAN NOTE
Per primary team. Risk factor for stroke  CHADsVASc 7  Coreg for rate control, coumadin 7.5 mg daily per primary team  At goal INR 2-3, INR 2.0

## 2019-04-03 NOTE — PT/OT/SLP PROGRESS
Physical Therapy      Patient Name:  Micheal Hunt   MRN:  [id number]    Therapist was unavailable in the morning.  Patient is now in dialysis (at 1400).  PT will attempt again later in the day if he is back from dialysis.    Rosemary Ruano, PT   4/3/2019

## 2019-04-03 NOTE — ASSESSMENT & PLAN NOTE
R forearm edema, mild erythema  Arterial line removed, evaluated by ortho for compartment syndrome, negative  Started on clindamycin 300 mg q6hrs for possible cellulitis in the [facility] on 3/30  4/1 night with low grade fever T 100.6, hypotension x 1 though hypertensive otherwise  4/2 edema worsening, primary team d/c'd clindamycin and started IV vancomycin x 1 and cefepime. Random vanc level in the AM  4/3 erythema stable, random vanc 13.5, procal 0.41    Plan:   De-escalate abx to Doxycycline 100 mg BID  Elevate R arm   RUE U/S negative for DVT

## 2019-04-03 NOTE — SUBJECTIVE & OBJECTIVE
Past Medical History:   Diagnosis Date    NICK (acute kidney injury) 7/29/2016    Allergy     Anemia, mild 12/15/2014    Arthritis     Gout    Benign essential HTN 3/27/2012    BMI 29.0-29.9,adult 5/10/2018    BPH (benign prostatic hyperplasia)     BPH (benign prostatic hyperplasia)     CAD (coronary artery disease) 2006    Chronic kidney disease     due to ibuprofen    Colon polyp     CRF (chronic renal failure), stage 5     Diverticulosis     Gastritis     GERD (gastroesophageal reflux disease)     Gout     History of colon polyps 5/3/2018    HTN (hypertension) 3/27/2012    Hyperlipidemia     Hyperlipidemia     LLL pneumonia 6/14/2018    LVH (left ventricular hypertrophy)     Mesenteric ischemia     Murmur, cardiac 3/27/2012    GRICELDA (obstructive sleep apnea)     DOES NOT USE A MACHINE    Sinus problem     Syncope and collapse        Past Surgical History:   Procedure Laterality Date    APPENDECTOMY      BLOCK-NERVE Left 5/31/2016    [facility] at [facility] OR    CARDIAC CATHETERIZATION      COLONOSCOPY  2011    COLONOSCOPY N/A 5/3/2018    Performed by [facility] at [facility] ENDO    COLONOSCOPY N/A 9/10/2015    Performed by [facility] at [facility] ENDO    CORONARY ARTERY BYPASS GRAFT  4/2007    x 1    CYSTOSCOPY N/A 8/30/2017    [facility] by [facility]ring [facility] at [facility] OR    CYSTOSCOPY N/A 11/10/2015    [facility] by [facility] at [facility] OR    ESOPHAGOGASTRODUODENOSCOPY (EGD) N/A 1/4/2016    Performed by Tra [facility] at [facility] ENDO (2ND FLR)    ESOPHAGOGASTRODUODENOSCOPY (EGD) N/A 10/15/2014    Performed by Messi Harris MD at [facility] ENDO    INJECTION-STEROID-EPIDURAL-TRANSFORAMINAL Left 2/25/2016    Per[facility] at [facility] OR    JOINT REPLACEMENT      left knee total replacement  X 3    mid leftt finger      from a cactuss    MOLE REMOVAL  2016    RADIOFREQUENCY THERMOCOAGULATION (RFTC)-NERVE-MEDIAN BRANCH-LUMBAR Left  4/11/2016    [facility] OR    rotative cuff      no rotative cuffs on bilat shoulders has pins     SHOULDER SURGERY      shoulder surgery bilat  RIGHT X 4; LEFT X 3    TRANSRECTAL ULTRASOUND GUIDED PROSTATE BIOPSY Bilateral 11/10/2015    [facility] OR    ULTRASOUND-ENDOSCOPIC-UPPER N/A 5/31/2017    Performed by Tra [facility] at [facility] ENDO (2ND FLR)    ULTRASOUND-ENDOSCOPIC-UPPER N/A 1/4/2016    Performed by Tra Brooks MD at [facility] ENDO (2ND FLR)    ULTRASOUND-ENDOSCOPIC-UPPER N/A 1/16/2015    Performed by Jason Saleem MD at [facility] ENDO (2ND FLR)       Review of patient's allergies indicates:   Allergen Reactions    Ace inhibitors Other (See Comments)     Cough    Arb-angiotensin receptor antagonist Itching    Eplerenone Other (See Comments)     Marked bradycardia, 40, tiredness and weakness      Sulfa (sulfonamide antibiotics) Itching     Patient says this was 10 years ago and doesn't remember what happened       Family History     Problem Relation (Age of Onset)    Cancer Father    Heart disease Mother, Sister    Hypertension Brother    Pneumonia Sister    Stroke Sister    Sudden death Father          Tobacco Use    Smoking status: Never Smoker    Smokeless tobacco: Never Used   Substance and Sexual Activity    Alcohol use: No    Drug use: No    Sexual activity: Not on file       Review of Systems   Unable to perform ROS: Mental status change       Objective:     Temp:  [98.1 °F (36.7 °C)-99.4 °F (37.4 °C)] 98.4 °F (36.9 °C)  Pulse:  [64-84] 67  Resp:  [16-18] 18  SpO2:  [93 %-99 %] 95 %  BP: (120-171)/(51-86) 171/81     Body mass index is 31.01 kg/m².      Bladder Scan Volume (mL): 315 mL (04/03/19 1020)    Drains     Drain                 Hemodialysis AV Fistula Left upper arm -- days                Physical Exam   Constitutional: He appears well-developed and well-nourished. No distress.   HENT:   Head: Normocephalic and atraumatic.    Eyes: No scleral icterus.   Neck: No JVD present.   Cardiovascular: Normal rate and regular rhythm.    Pulmonary/Chest: Effort normal. No respiratory distress.   Abdominal: Soft. He exhibits no distension. There is no tenderness. There is no rebound and no guarding.   Right groin hematoma present extending to RLQ and into scrotum, soft.    Genitourinary: Right testis shows no mass and no tenderness. Left testis shows no mass and no tenderness. Uncircumcised. No phimosis or paraphimosis.   Genitourinary Comments: Scrotal hematoma present, soft.   Skin: He is not diaphoretic. No pallor.         Significant Labs:    BMP:  Recent Labs   Lab 04/01/19  0559 04/02/19  1023 04/03/19  0445    141 140   K 4.9 4.2 4.3   * 107 109   CO2 20* 24 22*   BUN 51* 37* 49*   CREATININE 5.9* 4.8* 5.6*   CALCIUM 7.7* 8.1* 7.5*       CBC:  Recent Labs   Lab 04/02/19  1023 04/02/19  2052 04/03/19  0445   WBC 7.19 6.81 6.13   HGB 8.6* 7.8* 7.7*   HCT 25.6* 23.1* 23.4*   PLT 98* 98* 106*       Urine Studies:   Recent Labs   Lab 04/02/19  0739   COLORU Yellow   APPEARANCEUA Clear   PHUR >8.0*   SPECGRAV 1.010   PROTEINUA 2+*   GLUCUA Negative   KETONESU Negative   BILIRUBINUA Negative   OCCULTUA 1+*   NITRITE Negative   LEUKOCYTESUR Negative   RBCUA 3   WBCUA 2   BACTERIA Occasional   HYALINECASTS 0       Significant Imaging:  CT: I have reviewed all results within the past 24 hours and my personal findings are:  enlarged prostate, no renal masses, no hydro, bladder not distended

## 2019-04-03 NOTE — ASSESSMENT & PLAN NOTE
H/o and CT showed enlarged prostate   - continue flomax and finasteride  -has been making urine and several episodes of incontinence per sister in law but has not urinated since yesterday  -u/a negative, bladder scan   - hematuria today with clots - urology consulted   -q 6 hour bladder scans

## 2019-04-03 NOTE — ASSESSMENT & PLAN NOTE
Micheal Hunt is a 78 y.o. male admitted with stroke, now with possible hematuria    - UA yesterday shows only 3 RBCs, which would be unusual in setting of gross hematuria  - Check PVR after patient is able to void  - Please call if residuals are over 300 cc  - Continue to monitor, suspect that bleeding is from prostate and will self resolve   - Patient may follow up with Dr. Payne as outpatient for discussion of hematuria workup

## 2019-04-03 NOTE — NURSING
Notified provider of daughter's concern for CPAP q hs. Pt does not use a CPAP at home currently but daughter stated that he has had sleep studies done in the past at [facility] in [place].

## 2019-04-03 NOTE — NURSING
Called to room by family member stating that pt had moderate amount of bloody discharge on pad. Observed on pad a moderate amount of bloody discharge on pad unsure if from stool or urine. Notified stroke team concerning new findings. New orders noted.

## 2019-04-03 NOTE — PLAN OF CARE
Problem: SLP Goal  Goal: SLP Goal  Goals to be met 4/8  1 pt will tolerate regular diet and thin liquids/met  2 pt will participate in speech lang eval/met  3.  [place] to time and place  4.  Respond to categorization tasks with 80% accuracy  5.  Assess functional reading and writing skills  6.  Respond to problem solving tasks with 80% accuracy       Outcome: Ongoing (interventions implemented as appropriate)  ST will continue to follow 4x a week.   RADHA Ansari., CCC-SLP  Pager: [phone]  04/03/2019

## 2019-04-03 NOTE — SUBJECTIVE & OBJECTIVE
Interval History 4/3/2019:  Patient is seen for follow-up rehab evaluation and recommendations: Declined PT and OT yesterday 2/2 fatigue.  HD today.  Hospital Medicine following for co-management.      HPI, Past Medical, Family, and Social History remains the same as documented in the initial encounter.    Scheduled Medications:    sodium chloride 0.9%   Intravenous Once    sodium chloride 0.9%   Intravenous Once    allopurinol  100 mg Oral Daily    atorvastatin  80 mg Oral Daily    carvedilol  25 mg Oral BID WM    ceFEPime (MAXIPIME) IVPB  1 g Intravenous Daily    epoetin kitty (PROCRIT) injection  10,000 Units Intravenous Every Mon, Wed, Fri    finasteride  5 mg Oral Daily    gabapentin  300 mg Oral QHS    insulin detemir U-100  8 Units Subcutaneous Daily    losartan  100 mg Oral QHS    pantoprazole  40 mg Oral Daily    polyethylene glycol  17 g Oral Daily    senna-docusate 8.6-50 mg  1 tablet Oral BID    tamsulosin  1 capsule Oral Daily    torsemide  20 mg Oral BID    warfarin  7.5 mg Oral Daily     PRN Medications: sodium chloride, sodium chloride 0.9%, acetaminophen, albuterol-ipratropium, dextrose 50%, dextrose 50%, glucagon (human recombinant), glucose, glucose, insulin aspart U-100, nitroGLYCERIN, ondansetron, ramelteon    Review of Systems   Constitutional: Positive for activity change and fatigue. Negative for chills and fever.   HENT: Positive for trouble swallowing and voice change. Negative for drooling and hearing loss.    Eyes: Positive for visual disturbance. Negative for pain.   Respiratory: Negative for cough, shortness of breath and wheezing.    Cardiovascular: Negative for chest pain and palpitations.   Gastrointestinal: Negative for abdominal pain, nausea and vomiting.   Genitourinary: Positive for difficulty urinating. Negative for flank pain.   Musculoskeletal: Positive for arthralgias, gait problem and myalgias. Negative for back pain and neck pain.   Skin: Positive for wound.  Negative for rash.   Neurological: Positive for speech difficulty and weakness. Negative for dizziness and headaches.   Psychiatric/Behavioral: Negative for agitation and hallucinations. The patient is not nervous/anxious.      Objective:     Vital Signs (Most Recent):  Temp: 99.4 °F (37.4 °C) (04/03/19 1000)  Pulse: 69 (04/03/19 1000)  Resp: 16 (04/03/19 1000)  BP: (!) 162/86 (04/03/19 1000)  SpO2: 95 % (04/03/19 1000)    Vital Signs (24h Range):  Temp:  [98.1 °F (36.7 °C)-99.4 °F (37.4 °C)] 99.4 °F (37.4 °C)  Pulse:  [65-84] 69  Resp:  [16-18] 16  SpO2:  [93 %-99 %] 95 %  BP: (120-162)/(51-86) 162/86     Physical Exam   Constitutional: He appears well-developed and well-nourished. No distress.   HENT:   Head: Normocephalic and atraumatic.   Right Ear: External ear normal.   Left Ear: External ear normal.   Nose: Nose normal.   + dysphonia   Eyes: Right eye exhibits no discharge. Left eye exhibits no discharge. No scleral icterus.   Neck: Normal range of motion.   Cardiovascular: Normal rate and intact distal pulses.   Pulmonary/Chest: Effort normal. No respiratory distress. He has no wheezes.   Abdominal: Soft. He exhibits no distension. There is no tenderness.   Musculoskeletal: Normal range of motion. He exhibits no edema.        Right hip: He exhibits tenderness and swelling (hematoma).        Right forearm: He exhibits tenderness and swelling.   Neurological: He is alert. No sensory deficit.   -  Mental Status:  Awake and alert.  Follows commands.   -  Speech and language:  + aphasia and dysarthria.    -  Vision:  R hemianopsia.   -  Motor:  RUE: 1/5.  LUE: 5/5.  RLE: 2/5.  LLE: 5/5.   Skin: Skin is warm and dry. No rash noted.   Psychiatric: His behavior is normal. His affect is blunt. Cognition and memory are impaired.   Vitals reviewed.    Diagnostic Results:   Labs: Reviewed  X-Ray: Reviewed  CT: Reviewed  MRI: Reviewed

## 2019-04-03 NOTE — PROGRESS NOTES
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 Micheal Hunt, 78 y.o. male with history of HTN, HLD, GRICELDA, CAD (s/p CABG 2007), ESRD on HD MWF, CHFpEF, Afib on coumadin, DM2,and recent admission for PNA was transferred to [facility] from [place] for unstable angina and hrt cath.  Prior to heart cath, stroke code was called for right sided weakness, facial droop and dysarthria. MRI showed L PCA infarction, not candidate for TPA. Admitted to neuro critical care and started on heparin gtt. Underwent US of LE and of carotid with evidence of <50% L ICA stenosis and 60-70% R ICA stenosis, heavily calcified on CTA, additionally L vertebral also heavily calcified. Nephrology following for ESRD on HD. During hospitalization, pt noted to have gross swelling of R forearm - tense, pulses intact. Patient evaluated by orthopedic surgery regarding R forearm and feel that this is not compartment syndrome. Pt was started on clindamycin for suspected right arm cellulitis. On 3/31, pt was stepped down to vascular neurology. Pt transfused with pRBC x1 due to drop in Hbg. Discontinued heparin gtt. Pt also noted to have R groin hematoma, worsening. Stat right femoral artery u/s negative for any fluid collection, aneurysm, or stenosis. 4/2 Right arm edema worsening so switched antibiotics from oral clinda to IV vancomycin and cefepime. Hospital medicine consulted for multiple medical issues: R groin hematoma, R arm cellulitis management.        Hospital Course:  No notes on file    Past Medical History:   Diagnosis  Date    NICK (acute kidney injury) 7/29/2016    Allergy     Anemia, mild 12/15/2014    Arthritis     Gout    Benign essential HTN 3/27/2012    BMI 29.0-29.9,adult 5/10/2018    BPH (benign prostatic hyperplasia)     BPH (benign prostatic hyperplasia)     CAD (coronary artery disease) 2006    Chronic kidney disease     due to ibuprofen    Colon polyp     CRF (chronic renal failure), stage 5     Diverticulosis     Gastritis     GERD (gastroesophageal reflux disease)     Gout     History of colon polyps 5/3/2018    HTN (hypertension) 3/27/2012    Hyperlipidemia     Hyperlipidemia     LLL pneumonia 6/14/2018    LVH (left ventricular hypertrophy)     Mesenteric ischemia     Murmur, cardiac 3/27/2012    GRICELDA (obstructive sleep apnea)     DOES NOT USE A MACHINE    Sinus problem     Syncope and collapse        Past Surgical History:   Procedure Laterality Date    APPENDECTOMY      BLOCK-NERVE Left 5/31/2016    [facility]    CARDIAC CATHETERIZATION      COLONOSCOPY  2011    COLONOSCOPY N/A 5/3/2018    [facility] at [facility] ENDO    COLONOSCOPY N/A 9/10/2015    [facility]formed by [facility] at [facility] ENDO    CORONARY ARTERY BYPASS GRAFT  4/2007    x 1    CYSTOSCOPY N/A 8/30/2017    [facility]    CYSTOSCOPY N/A 11/10/2015    Performed by [facility]ring [facility] at [facility] OR    ESOPHAGOGASTRODUODENOSCOPY (EGD) N/A 1/4/2016    Performed by Tra [facility] at [facility] ENDO (2ND FLR)    ESOPHAGOGASTRODUODENOSCOPY (EGD) N/A 10/15/2014    Performed by Messi Harris [facility] at [facility] ENDO    INJECTION-STEROID-EPIDURAL-TRANSFORAMINAL Left 2/25/2016    [facility]    JOINT REPLACEMENT      left knee total replacement  X 3    mid leftt finger      from a cactuss    MOLE REMOVAL  2016    RADIOFREQUENCY THERMOCOAGULATION (RFTC)-NERVE-MEDIAN BRANCH-LUMBAR Left 4/11/2016    Performed by [facility]  at [facility] OR    rotative cuff      no rotative cuffs on bilat shoulders has pins     SHOULDER SURGERY      shoulder surgery bilat  RIGHT X 4; LEFT X 3    TRANSRECTAL ULTRASOUND GUIDED PROSTATE BIOPSY Bilateral 11/10/2015    [facility] OR    ULTRASOUND-ENDOSCOPIC-UPPER N/A 5/31/2017    [facility] at [facility] ENDO (2ND FLR)    ULTRASOUND-ENDOSCOPIC-UPPER N/A 1/4/2016    Performed by Tra Brooks [facility] at [facility] ENDO (2ND FLR)    ULTRASOUND-ENDOSCOPIC-UPPER N/A 1/16/2015    Performed by Jason Saleem MD at [facility] ENDO (2ND FLR)       Review of patient's allergies indicates:   Allergen Reactions    Ace inhibitors Other (See Comments)     Cough    Arb-angiotensin receptor antagonist Itching    Eplerenone Other (See Comments)     Marked bradycardia, 40, tiredness and weakness      Sulfa (sulfonamide antibiotics) Itching     Patient says this was 10 years ago and doesn't remember what happened       No current facility-administered medications on file prior to encounter.      Current Outpatient Medications on File Prior to Encounter   Medication Sig    allopurinol (ZYLOPRIM) 100 MG tablet Take 100 mg by mouth once daily.    amLODIPine (NORVASC) 10 MG tablet Take 10 mg by mouth once daily.    aspirin (ECOTRIN) 81 MG EC tablet Take 81 mg by mouth once daily.      atorvastatin (LIPITOR) 80 MG tablet TAKE 1 TABLET (80 MG TOTAL) BY MOUTH ONCE DAILY.    budesonide-formoterol 160-4.5 mcg (SYMBICORT) 160-4.5 mcg/actuation HFAA Inhale 2 puffs into the lungs every 12 (twelve) hours. Controller    carvedilol (COREG) 12.5 MG tablet Take 12.5 mg by mouth 2 (two) times daily with meals.    celecoxib (CELEBREX) 200 MG capsule Take 1 capsule (200 mg total) by mouth once daily.    finasteride (PROSCAR) 5 mg tablet TAKE 1 TABLET ONCE DAILY.    fluticasone (FLONASE) 50 mcg/actuation nasal spray 2 sprays (100 mcg total) by Each Nare route once daily.    gabapentin (NEURONTIN) 300  MG capsule Take 1 capsule (300 mg total) by mouth every evening.    isosorbide mononitrate (ISMO,MONOKET) 10 mg tablet TAKE 1 TABLET BY MOUTH EVERY DAY IN THE EVENING    meclizine (ANTIVERT) 25 mg tablet Take 1 tablet (25 mg total) by mouth 3 (three) times daily as needed for Dizziness.    mirtazapine (REMERON) 7.5 MG Tab TAKE 1 TABLET BY MOUTH EVERY EVENING.    montelukast (SINGULAIR) 10 mg tablet Take 10 mg by mouth every evening.    NITROSTAT 0.4 mg SL tablet PLACE 1 TABLET (0.4 MG TOTAL) UNDER THE TONGUE EVERY 5 (FIVE) MINUTES AS NEEDED FOR CHEST PAIN.    omeprazole (PRILOSEC) 20 MG capsule TAKE 1 CAPSULE BY MOUTH EVERY DAY    tamsulosin (FLOMAX) 0.4 mg Cap TAKE 1 CAPSULE BY MOUTH EVERY DAY    tiotropium bromide (SPIRIVA RESPIMAT) 1.25 mcg/actuation Mist Inhale 2.5 mcg into the lungs once daily. Controller    torsemide (DEMADEX) 20 MG Tab Take 1 tablet (20 mg total) by mouth 2 (two) times daily.    warfarin (COUMADIN) 7.5 MG tablet Take 1 tablet (7.5 mg total) by mouth Daily.    zolpidem (AMBIEN) 5 MG Tab TAKE 1 TABLET BY MOUTH EVERY EVENING AS NEEDED    albuterol (PROVENTIL/VENTOLIN HFA) 90 mcg/actuation inhaler 2 puffs every 4 hours as needed for cough, wheeze, or shortness of breath    albuterol-ipratropium (DUO-NEB) 2.5 mg-0.5 mg/3 mL nebulizer solution Take 3 mLs by nebulization every 6 (six) hours as needed for Wheezing or Shortness of Breath.     Family History     Problem Relation (Age of Onset)    Cancer Father    Heart disease Mother, Sister    Hypertension Brother    Pneumonia Sister    Stroke Sister    Sudden death Father        Tobacco Use    Smoking status: Never Smoker    Smokeless tobacco: Never Used   Substance and Sexual Activity    Alcohol use: No    Drug use: No    Sexual activity: Not on file     Review of Systems   Constitutional: Negative for fatigue and fever.   HENT: Negative for congestion and rhinorrhea.    Eyes: Negative for pain and visual disturbance.    Respiratory: Negative for cough, shortness of breath and wheezing.    Cardiovascular: Negative for chest pain, palpitations and leg swelling.   Gastrointestinal: Negative for abdominal pain, nausea and vomiting.   Genitourinary: Negative for difficulty urinating, dysuria and hematuria.   Musculoskeletal: Negative for arthralgias and back pain.   Skin: Positive for color change. Negative for pallor.   Neurological: Positive for facial asymmetry, speech difficulty, weakness (R sided) and numbness. Negative for dizziness and headaches.   Hematological: Negative for adenopathy. Bruises/bleeds easily.   Psychiatric/Behavioral: Positive for confusion and decreased concentration. Negative for agitation. The patient is not nervous/anxious.      Objective:     Vital Signs (Most Recent):  Temp: 99.4 °F (37.4 °C) (04/03/19 1000)  Pulse: 64 (04/03/19 1100)  Resp: 16 (04/03/19 1000)  BP: (!) 162/86 (04/03/19 1000)  SpO2: 95 % (04/03/19 1000) Vital Signs (24h Range):  Temp:  [98.1 °F (36.7 °C)-99.4 °F (37.4 °C)] 99.4 °F (37.4 °C)  Pulse:  [64-84] 64  Resp:  [16-18] 16  SpO2:  [93 %-99 %] 95 %  BP: (120-162)/(51-86) 162/86     Weight: 106.6 kg (235 lb 0.2 oz)  Body mass index is 31.01 kg/m².    Physical Exam   Constitutional: He appears well-developed and well-nourished. No distress.   HENT:   Head: Normocephalic and atraumatic.   Mouth/Throat: No oropharyngeal exudate.   Eyes: Pupils are equal, round, and reactive to light. EOM are normal. Right eye exhibits no discharge. Left eye exhibits no discharge.   Neck: Normal range of motion. Neck supple.   Cardiovascular: Normal rate and intact distal pulses.   No murmur heard.  Pulmonary/Chest: Effort normal and breath sounds normal. No stridor. No respiratory distress. He has no wheezes. He exhibits no tenderness.   Abdominal: Soft. Bowel sounds are normal. There is no tenderness. There is no guarding.   Musculoskeletal: He exhibits no edema or tenderness.   Neurological: He is  alert.   Follows commands. Mostly nods to yes or no questions. Aphasic, dysarthria   Skin: Skin is warm and dry. He is not diaphoretic. There is erythema.   R groin hematoma extending to scrotum/pelvis, RUE and R foot bruising. Mild erythema to R forearm near wrist with vesicle, no TTP, no drainage --stable from yesterday   Psychiatric: He has a normal mood and affect. His behavior is normal.   Vitals reviewed.      Significant Labs:   CBC:   Recent Labs   Lab 04/02/19  1023 04/02/19 2052 04/03/19  0445   WBC 7.19 6.81 6.13   HGB 8.6* 7.8* 7.7*   HCT 25.6* 23.1* 23.4*   PLT 98* 98* 106*     CMP:   Recent Labs   Lab 04/02/19  1023 04/03/19  0445    140   K 4.2 4.3    109   CO2 24 22*   * 101   BUN 37* 49*   CREATININE 4.8* 5.6*   CALCIUM 8.1* 7.5*   PROT 5.4* 5.1*   ALBUMIN 2.8* 2.7*   BILITOT 0.8 0.9   ALKPHOS 93 85   AST 21 17   ALT 29 23   ANIONGAP 10 9   EGFRNONAA 10.7* 8.9*       Significant Imaging: I have reviewed all pertinent imaging results/findings within the past 24 hours.    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 No TTP  Drop in Hgb (8.6 > 7.7), pt had blood clots passed in urine  LE U/S negative for any fluid collection, aneurysm, or stenosis    Plan:  Cont to monitor daily CBCs  Cont to monitor size          Cellulitis of right arm  R forearm edema, mild erythema  Arterial line removed, evaluated by ortho for compartment syndrome, negative  Started on clindamycin 300 mg q6hrs for possible cellulitis in the [facility] on 3/30  4/1 night with low grade fever T 100.6, hypotension x 1 though hypertensive otherwise  4/2 edema worsening, primary team d/c'd clindamycin  and started IV vancomycin x 1 and cefepime. Random vanc level in the AM  4/3 erythema stable, random vanc 13.5, procal 0.41    Plan:   De-escalate abx to Doxycycline 100 mg BID  Elevate R arm   RUE U/S negative for DVT        Paroxysmal atrial fibrillation  Per primary team. Risk factor for stroke  CHADsVASc 7  Coreg for rate control, coumadin 7.5 mg daily per primary team  At goal INR 2-3, INR 2.0        ESRD (end stage renal disease) on dialysis  Nephrology following, HD MWF      Type 2 diabetes mellitus, without long-term current use of insulin  Hgb A1c 8.3  Per primary team, detemir 8 u qHS, MDSS, POCT glc q6hrs          Benign prostatic hyperplasia without lower urinary tract symptoms  Cont flomax and finasteride  Has been making urine and several episodes of incontinence, documented unmeasured urine per nursing. Frequency of urination has decreased as PO intake has decreased but pt is still making urine and soaking the pad  UA negative for infection  Noted bloody clots passed with urine per nursing. Urology consulted, appreciate assistance  Cont to monitor          Essential hypertension  Risk factor for stroke, primary team goal SBP <160  Cont Torsemide 20 mg q12, losartan 100 mg qd, and Carvedilol 25 mg q12                 VTE Risk Mitigation (From admission, onward)        Ordered     warfarin tablet 7.5 mg  Daily      04/03/19 0923     IP VTE HIGH RISK PATIENT  Once      03/22/19 4400              Marivel Casanova MD  Department of Hospital Medicine   [facility]

## 2019-04-04 LAB
ALBUMIN SERPL BCP-MCNC: 2.7 G/DL (ref 3.5–5.2)
ALP SERPL-CCNC: 90 U/L (ref 55–135)
ALT SERPL W/O P-5'-P-CCNC: 23 U/L (ref 10–44)
ANION GAP SERPL CALC-SCNC: 7 MMOL/L (ref 8–16)
AST SERPL-CCNC: 19 U/L (ref 10–40)
BASOPHILS # BLD AUTO: 0.02 K/UL (ref 0–0.2)
BASOPHILS NFR BLD: 0.3 % (ref 0–1.9)
BILIRUB SERPL-MCNC: 1.3 MG/DL (ref 0.1–1)
BUN SERPL-MCNC: 25 MG/DL (ref 8–23)
CALCIUM SERPL-MCNC: 8.5 MG/DL (ref 8.7–10.5)
CHLORIDE SERPL-SCNC: 106 MMOL/L (ref 95–110)
CO2 SERPL-SCNC: 24 MMOL/L (ref 23–29)
CREAT SERPL-MCNC: 3.8 MG/DL (ref 0.5–1.4)
DIFFERENTIAL METHOD: ABNORMAL
EOSINOPHIL # BLD AUTO: 0.1 K/UL (ref 0–0.5)
EOSINOPHIL NFR BLD: 1.2 % (ref 0–8)
ERYTHROCYTE [DISTWIDTH] IN BLOOD BY AUTOMATED COUNT: 15.6 % (ref 11.5–14.5)
EST. GFR  (AFRICAN AMERICAN): 16.4 ML/MIN/1.73 M^2
EST. GFR  (NON AFRICAN AMERICAN): 14.2 ML/MIN/1.73 M^2
FERRITIN SERPL-MCNC: 469 NG/ML (ref 20–300)
GLUCOSE SERPL-MCNC: 103 MG/DL (ref 70–110)
HCT VFR BLD AUTO: 23.8 % (ref 40–54)
HGB BLD-MCNC: 7.8 G/DL (ref 14–18)
IMM GRANULOCYTES # BLD AUTO: 0.07 K/UL (ref 0–0.04)
IMM GRANULOCYTES NFR BLD AUTO: 1.1 % (ref 0–0.5)
INR PPP: 2 (ref 0.8–1.2)
IRON SERPL-MCNC: 31 UG/DL (ref 45–160)
LYMPHOCYTES # BLD AUTO: 1.3 K/UL (ref 1–4.8)
LYMPHOCYTES NFR BLD: 20.2 % (ref 18–48)
MCH RBC QN AUTO: 31.3 PG (ref 27–31)
MCHC RBC AUTO-ENTMCNC: 32.8 G/DL (ref 32–36)
MCV RBC AUTO: 96 FL (ref 82–98)
MONOCYTES # BLD AUTO: 0.6 K/UL (ref 0.3–1)
MONOCYTES NFR BLD: 8.5 % (ref 4–15)
NEUTROPHILS # BLD AUTO: 4.5 K/UL (ref 1.8–7.7)
NEUTROPHILS NFR BLD: 68.7 % (ref 38–73)
NRBC BLD-RTO: 1 /100 WBC
PHOSPHATE SERPL-MCNC: 2.8 MG/DL (ref 2.7–4.5)
PLATELET # BLD AUTO: 118 K/UL (ref 150–350)
PMV BLD AUTO: 10.1 FL (ref 9.2–12.9)
POCT GLUCOSE: 116 MG/DL (ref 70–110)
POCT GLUCOSE: 135 MG/DL (ref 70–110)
POCT GLUCOSE: 160 MG/DL (ref 70–110)
POCT GLUCOSE: 168 MG/DL (ref 70–110)
POTASSIUM SERPL-SCNC: 4.3 MMOL/L (ref 3.5–5.1)
PROT SERPL-MCNC: 5.3 G/DL (ref 6–8.4)
PROTHROMBIN TIME: 19.6 SEC (ref 9–12.5)
RBC # BLD AUTO: 2.49 M/UL (ref 4.6–6.2)
SATURATED IRON: 11 % (ref 20–50)
SODIUM SERPL-SCNC: 137 MMOL/L (ref 136–145)
TOTAL IRON BINDING CAPACITY: 295 UG/DL (ref 250–450)
TRANSFERRIN SERPL-MCNC: 199 MG/DL (ref 200–375)
WBC # BLD AUTO: 6.58 K/UL (ref 3.9–12.7)

## 2019-04-04 PROCEDURE — 99232 PR SUBSEQUENT HOSPITAL CARE,LEVL II: ICD-10-PCS | Mod: ,,, | Performed by: NURSE PRACTITIONER

## 2019-04-04 PROCEDURE — 36415 COLL VENOUS BLD VENIPUNCTURE: CPT

## 2019-04-04 PROCEDURE — 85610 PROTHROMBIN TIME: CPT

## 2019-04-04 PROCEDURE — 97112 NEUROMUSCULAR REEDUCATION: CPT

## 2019-04-04 PROCEDURE — 20600001 HC STEP DOWN PRIVATE ROOM

## 2019-04-04 PROCEDURE — 82728 ASSAY OF FERRITIN: CPT

## 2019-04-04 PROCEDURE — 99900035 HC TECH TIME PER 15 MIN (STAT)

## 2019-04-04 PROCEDURE — 99233 SBSQ HOSP IP/OBS HIGH 50: CPT | Mod: GC,,, | Performed by: PSYCHIATRY & NEUROLOGY

## 2019-04-04 PROCEDURE — 80053 COMPREHEN METABOLIC PANEL: CPT

## 2019-04-04 PROCEDURE — 25000003 PHARM REV CODE 250: Performed by: STUDENT IN AN ORGANIZED HEALTH CARE EDUCATION/TRAINING PROGRAM

## 2019-04-04 PROCEDURE — 97530 THERAPEUTIC ACTIVITIES: CPT

## 2019-04-04 PROCEDURE — 84100 ASSAY OF PHOSPHORUS: CPT

## 2019-04-04 PROCEDURE — 99233 PR SUBSEQUENT HOSPITAL CARE,LEVL III: ICD-10-PCS | Mod: GC,,, | Performed by: PSYCHIATRY & NEUROLOGY

## 2019-04-04 PROCEDURE — 25000003 PHARM REV CODE 250: Performed by: PHYSICIAN ASSISTANT

## 2019-04-04 PROCEDURE — 83540 ASSAY OF IRON: CPT

## 2019-04-04 PROCEDURE — 85025 COMPLETE CBC W/AUTO DIFF WBC: CPT

## 2019-04-04 PROCEDURE — 99232 PR SUBSEQUENT HOSPITAL CARE,LEVL II: ICD-10-PCS | Mod: GC,,, | Performed by: HOSPITALIST

## 2019-04-04 PROCEDURE — 25000003 PHARM REV CODE 250: Performed by: NURSE PRACTITIONER

## 2019-04-04 PROCEDURE — 27000190 HC CPAP FULL FACE MASK W/VALVE

## 2019-04-04 PROCEDURE — 25000003 PHARM REV CODE 250: Performed by: HOSPITALIST

## 2019-04-04 PROCEDURE — 25000003 PHARM REV CODE 250: Performed by: INTERNAL MEDICINE

## 2019-04-04 PROCEDURE — 99232 SBSQ HOSP IP/OBS MODERATE 35: CPT | Mod: ,,, | Performed by: NURSE PRACTITIONER

## 2019-04-04 PROCEDURE — 99232 SBSQ HOSP IP/OBS MODERATE 35: CPT | Mod: GC,,, | Performed by: HOSPITALIST

## 2019-04-04 PROCEDURE — 92507 TX SP LANG VOICE COMM INDIV: CPT

## 2019-04-04 PROCEDURE — 94761 N-INVAS EAR/PLS OXIMETRY MLT: CPT

## 2019-04-04 RX ORDER — SODIUM CHLORIDE 9 MG/ML
INJECTION, SOLUTION INTRAVENOUS
Status: CANCELLED | OUTPATIENT
Start: 2019-04-04

## 2019-04-04 RX ORDER — SODIUM CHLORIDE 9 MG/ML
INJECTION, SOLUTION INTRAVENOUS ONCE
Status: CANCELLED | OUTPATIENT
Start: 2019-04-04 | End: 2019-04-04

## 2019-04-04 RX ADMIN — POLYETHYLENE GLYCOL 3350 17 G: 17 POWDER, FOR SOLUTION ORAL at 08:04

## 2019-04-04 RX ADMIN — STANDARDIZED SENNA CONCENTRATE AND DOCUSATE SODIUM 1 TABLET: 8.6; 5 TABLET, FILM COATED ORAL at 08:04

## 2019-04-04 RX ADMIN — CARVEDILOL 25 MG: 25 TABLET, FILM COATED ORAL at 06:04

## 2019-04-04 RX ADMIN — PANTOPRAZOLE SODIUM 40 MG: 40 TABLET, DELAYED RELEASE ORAL at 08:04

## 2019-04-04 RX ADMIN — STANDARDIZED SENNA CONCENTRATE AND DOCUSATE SODIUM 1 TABLET: 8.6; 5 TABLET, FILM COATED ORAL at 09:04

## 2019-04-04 RX ADMIN — INSULIN DETEMIR 8 UNITS: 100 INJECTION, SOLUTION SUBCUTANEOUS at 08:04

## 2019-04-04 RX ADMIN — FINASTERIDE 5 MG: 5 TABLET, FILM COATED ORAL at 08:04

## 2019-04-04 RX ADMIN — LOSARTAN POTASSIUM 100 MG: 50 TABLET, FILM COATED ORAL at 09:04

## 2019-04-04 RX ADMIN — TAMSULOSIN HYDROCHLORIDE 0.4 MG: 0.4 CAPSULE ORAL at 08:04

## 2019-04-04 RX ADMIN — DOXYCYCLINE HYCLATE 100 MG: 100 TABLET, COATED ORAL at 09:04

## 2019-04-04 RX ADMIN — GABAPENTIN 300 MG: 300 CAPSULE ORAL at 09:04

## 2019-04-04 RX ADMIN — ATORVASTATIN CALCIUM 80 MG: 20 TABLET, FILM COATED ORAL at 09:04

## 2019-04-04 RX ADMIN — ALLOPURINOL 100 MG: 100 TABLET ORAL at 08:04

## 2019-04-04 RX ADMIN — CARVEDILOL 25 MG: 25 TABLET, FILM COATED ORAL at 08:04

## 2019-04-04 RX ADMIN — TORSEMIDE 20 MG: 20 TABLET ORAL at 08:04

## 2019-04-04 RX ADMIN — DOXYCYCLINE HYCLATE 100 MG: 100 TABLET, COATED ORAL at 08:04

## 2019-04-04 RX ADMIN — TORSEMIDE 20 MG: 20 TABLET ORAL at 09:04

## 2019-04-04 RX ADMIN — RAMELTEON 8 MG: 8 TABLET, FILM COATED ORAL at 09:04

## 2019-04-04 RX ADMIN — WARFARIN SODIUM 7.5 MG: 2.5 TABLET ORAL at 06:04

## 2019-04-04 NOTE — PT/OT/SLP PROGRESS
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    Recommendations:                 General Recommendations:  Dysphagia therapy and Speech/language therapy  Diet recommendations:  Regular, Liquid Diet Level: Nectar Thick   Aspiration Precautions: Assistance with meals and Assistance with thickening liquids, HOB to 90 degrees, Meds crushed in puree, No straws, Small bites/sips and Strict aspiration precautions   General Precautions: Standard, aphasia, fall, nectar thick  Communication strategies:  yes/no questions only and provide increased time to answer    Subjective     " Finished"  Patient goals: did not state    Pain/Comfort:  · Pain Rating 1: 0/10  · Pain Rating Post-Intervention 1: 0/10    Objective:     Has the patient been evaluated by SLP for swallowing?   Yes  Keep patient NPO? No   Current Respiratory Status: room air      Pt seen initially following PT/OT. Pt tired and did not wish to work so returned later in day. Pt frustrated with word finding deficits. Reviewed role of slp and poc. Discussed aphasia and word finding strategies. Pt able to count to 10 and complete automatic phrases with 100% acc. He completed automatic sentences with 80% acc and perseverations noted. He completed responsive speech questions with 60% acc and perseverations. He was unable to name objects given max cues. Pt then stated" Finished". White board updated.     Assessment:     Micheal Hunt is a 78 y.o. male with an SLP diagnosis of Aphasia and Dysphagia.      Goals:   Multidisciplinary Problems     SLP Goals        Problem: SLP Goal    Goal Priority Disciplines Outcome   SLP Goal     SLP Ongoing (interventions implemented as appropriate)   Description:  Goals to be met 4/8  1 pt will tolerate regular diet and thin liquids/met  2 pt will participate in speech lang eval/met  3.  Elaine to time and " place  4.  Respond to categorization tasks with 80% accuracy  5.  Assess functional reading and writing skills  6.  Respond to problem solving tasks with 80% accuracy                        Plan:     · Patient to be seen:  4 x/week   · Plan of Care expires:  04/24/19  · Plan of Care reviewed with:  patient, daughter, family   · SLP Follow-Up:  Yes       Discharge recommendations:  rehabilitation facility       Time Tracking:     SLP Treatment Date:   04/04/19  Speech Start Time:  1300  Speech Stop Time:  1314     Speech Total Time (min):  14 min    Billable Minutes: Speech Therapy Individual 14    KRISTIN Taveras, CCC-SLP  04/04/2019

## 2019-04-04 NOTE — ASSESSMENT & PLAN NOTE
H/o and CT showed enlarged prostate   - continue flomax and finasteride  -has been making urine and several episodes of incontinence per sister in law but has not urinated since yesterday  -u/a negative, bladder scan   - hematuria today with clots - urology consulted , condom cath ordered today   -q 6 hour bladder scans

## 2019-04-04 NOTE — PROGRESS NOTES
[facility] Hospital Medicine  Progress Note    Patient Name: Micheal Hunt  MRN: [id number]  Patient Class: IP- Inpatient   Admission Date: 3/22/2019  Length of Stay: 13 days  Attending Physician: Tray Bazan MD  Primary Care Provider: Pk Lakhani MD    [facility] Medicine Team: Networked reference to record PCT  Trupti Reinoso MD    Subjective:     Principal Problem: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    HPI:  Mr. Micheal Hunt, 78 y.o. male with history of HTN, HLD, GRICELDA, CAD (s/p CABG 2007), ESRD on HD MWF, CHFpEF, Afib on coumadin, DM2,and recent admission for PNA was transferred to [facility] from [place] for unstable angina and hrt cath.  Prior to heart cath, stroke code was called for right sided weakness, facial droop and dysarthria. MRI showed L PCA infarction, not candidate for TPA. Admitted to neuro critical care and started on heparin gtt. Underwent US of LE and of carotid with evidence of <50% L ICA stenosis and 60-70% R ICA stenosis, heavily calcified on CTA, additionally L vertebral also heavily calcified. Nephrology following for ESRD on HD. During hospitalization, pt noted to have gross swelling of R forearm - tense, pulses intact. Patient evaluated by orthopedic surgery regarding R forearm and feel that this is not compartment syndrome. Pt was started on clindamycin for suspected right arm cellulitis. On 3/31, pt was stepped down to vascular neurology. Pt transfused with pRBC x1 due to drop in Hbg. Discontinued heparin gtt. Pt also noted to have R groin hematoma, worsening. Stat right femoral artery u/s negative for any fluid collection, aneurysm, or stenosis. 4/2 Right arm edema worsening so switched antibiotics from oral clinda to IV vancomycin and cefepime. Hospital medicine consulted for multiple medical issues: R groin hematoma, R arm cellulitis management.        Hospital Course:  No notes on file    Past Medical History:   Diagnosis  Date    NICK (acute kidney injury) 7/29/2016    Allergy     Anemia, mild 12/15/2014    Arthritis     Gout    Benign essential HTN 3/27/2012    BMI 29.0-29.9,adult 5/10/2018    BPH (benign prostatic hyperplasia)     BPH (benign prostatic hyperplasia)     CAD (coronary artery disease) 2006    Chronic kidney disease     due to ibuprofen    Colon polyp     CRF (chronic renal failure), stage 5     Diverticulosis     Gastritis     GERD (gastroesophageal reflux disease)     Gout     History of colon polyps 5/3/2018    HTN (hypertension) 3/27/2012    Hyperlipidemia     Hyperlipidemia     LLL pneumonia 6/14/2018    LVH (left ventricular hypertrophy)     Mesenteric ischemia     Murmur, cardiac 3/27/2012    GRICELDA (obstructive sleep apnea)     DOES NOT USE A MACHINE    Sinus problem     Syncope and collapse        Past Surgical History:   Procedure Laterality Date    APPENDECTOMY      BLOCK-NERVE Left 5/31/2016    Performed [facility] at [facility] OR    CARDIAC CATHETERIZATION      COLONOSCOPY  2011    COLONOSCOPY N/A 5/3/2018    Performed by [facility]i [facility] at [facility] ENDO    COLONOSCOPY N/A 9/10/2015    Performed by [facility]i [facility] MD at [facility] ENDO    CORONARY ARTERY BYPASS GRAFT  4/2007    x 1    CYSTOSCOPY N/A 8/30/2017    Performed by Rudy Herring MD at [facility] OR    CYSTOSCOPY N/A 11/10/2015    Performed by [facility]y [facility]ring MD at [facility] OR    ESOPHAGOGASTRODUODENOSCOPY (EGD) N/A 1/4/2016    Performed by Tra Brooks [facility] at [facility] ENDO (2ND FLR)    ESOPHAGOGASTRODUODENOSCOPY (EGD) N/A 10/15/2014    Performed by Messi Harris MD at [facility] ENDO    INJECTION-STEROID-EPIDURAL-TRANSFORAMINAL Left 2/25/2016    Performed by [facility] Leon [facility] at [facility]    JOINT REPLACEMENT      left knee total replacement  X 3    mid leftt finger      from a cactuss    MOLE REMOVAL  2016    RADIOFREQUENCY THERMOCOAGULATION (RFTC)-NERVE-MEDIAN BRANCH-LUMBAR Left 4/11/2016    Performed by Kevin Leon [facility]  at [facility] OR    rotative cuff      no rotative cuffs on bilat shoulders has pins     SHOULDER SURGERY      shoulder surgery bilat  RIGHT X 4; LEFT X 3    TRANSRECTAL ULTRASOUND GUIDED PROSTATE BIOPSY Bilateral 11/10/2015    [facility] OR    ULTRASOUND-ENDOSCOPIC-UPPER N/A 5/31/2017    Performed by Tra [facility] at [facility] ENDO (2ND FLR)    ULTRASOUND-ENDOSCOPIC-UPPER N/A 1/4/2016    Performed by Tra Brooks MD at [facility] ENDO (2ND FLR)    ULTRASOUND-ENDOSCOPIC-UPPER N/A 1/16/2015    Performed by Jason Saleem MD at [facility] ENDO (2ND FLR)       Review of patient's allergies indicates:   Allergen Reactions    Ace inhibitors Other (See Comments)     Cough    Arb-angiotensin receptor antagonist Itching    Eplerenone Other (See Comments)     Marked bradycardia, 40, tiredness and weakness      Sulfa (sulfonamide antibiotics) Itching     Patient says this was 10 years ago and doesn't remember what happened       No current facility-administered medications on file prior to encounter.      Current Outpatient Medications on File Prior to Encounter   Medication Sig    allopurinol (ZYLOPRIM) 100 MG tablet Take 100 mg by mouth once daily.    amLODIPine (NORVASC) 10 MG tablet Take 10 mg by mouth once daily.    aspirin (ECOTRIN) 81 MG EC tablet Take 81 mg by mouth once daily.      atorvastatin (LIPITOR) 80 MG tablet TAKE 1 TABLET (80 MG TOTAL) BY MOUTH ONCE DAILY.    budesonide-formoterol 160-4.5 mcg (SYMBICORT) 160-4.5 mcg/actuation HFAA Inhale 2 puffs into the lungs every 12 (twelve) hours. Controller    carvedilol (COREG) 12.5 MG tablet Take 12.5 mg by mouth 2 (two) times daily with meals.    celecoxib (CELEBREX) 200 MG capsule Take 1 capsule (200 mg total) by mouth once daily.    finasteride (PROSCAR) 5 mg tablet TAKE 1 TABLET ONCE DAILY.    fluticasone (FLONASE) 50 mcg/actuation nasal spray 2 sprays (100 mcg total) by Each Nare route once daily.    gabapentin (NEURONTIN) 300  MG capsule Take 1 capsule (300 mg total) by mouth every evening.    isosorbide mononitrate (ISMO,MONOKET) 10 mg tablet TAKE 1 TABLET BY MOUTH EVERY DAY IN THE EVENING    meclizine (ANTIVERT) 25 mg tablet Take 1 tablet (25 mg total) by mouth 3 (three) times daily as needed for Dizziness.    mirtazapine (REMERON) 7.5 MG Tab TAKE 1 TABLET BY MOUTH EVERY EVENING.    montelukast (SINGULAIR) 10 mg tablet Take 10 mg by mouth every evening.    NITROSTAT 0.4 mg SL tablet PLACE 1 TABLET (0.4 MG TOTAL) UNDER THE TONGUE EVERY 5 (FIVE) MINUTES AS NEEDED FOR CHEST PAIN.    omeprazole (PRILOSEC) 20 MG capsule TAKE 1 CAPSULE BY MOUTH EVERY DAY    tamsulosin (FLOMAX) 0.4 mg Cap TAKE 1 CAPSULE BY MOUTH EVERY DAY    tiotropium bromide (SPIRIVA RESPIMAT) 1.25 mcg/actuation Mist Inhale 2.5 mcg into the lungs once daily. Controller    torsemide (DEMADEX) 20 MG Tab Take 1 tablet (20 mg total) by mouth 2 (two) times daily.    warfarin (COUMADIN) 7.5 MG tablet Take 1 tablet (7.5 mg total) by mouth Daily.    zolpidem (AMBIEN) 5 MG Tab TAKE 1 TABLET BY MOUTH EVERY EVENING AS NEEDED    albuterol (PROVENTIL/VENTOLIN HFA) 90 mcg/actuation inhaler 2 puffs every 4 hours as needed for cough, wheeze, or shortness of breath    albuterol-ipratropium (DUO-NEB) 2.5 mg-0.5 mg/3 mL nebulizer solution Take 3 mLs by nebulization every 6 (six) hours as needed for Wheezing or Shortness of Breath.     Family History     Problem Relation (Age of Onset)    Cancer Father    Heart disease Mother, Sister    Hypertension Brother    Pneumonia Sister    Stroke Sister    Sudden death Father        Tobacco Use    Smoking status: Never Smoker    Smokeless tobacco: Never Used   Substance and Sexual Activity    Alcohol use: No    Drug use: No    Sexual activity: Not on file     Review of Systems   Constitutional: Negative for fatigue and fever.   HENT: Negative for congestion and rhinorrhea.    Eyes: Negative for pain and visual disturbance.    Respiratory: Negative for cough, shortness of breath and wheezing.    Cardiovascular: Negative for chest pain, palpitations and leg swelling.   Gastrointestinal: Negative for abdominal pain, nausea and vomiting.   Genitourinary: Negative for difficulty urinating, dysuria and hematuria.   Musculoskeletal: Negative for arthralgias and back pain.   Skin: Positive for color change. Negative for pallor.   Neurological: Positive for facial asymmetry, speech difficulty, weakness (R sided) and numbness. Negative for dizziness and headaches.   Hematological: Negative for adenopathy. Bruises/bleeds easily.   Psychiatric/Behavioral: Positive for confusion and decreased concentration. Negative for agitation. The patient is not nervous/anxious.      Objective:     Vital Signs (Most Recent):  Temp: 98.9 °F (37.2 °C) (04/04/19 0737)  Pulse: 65 (04/04/19 1100)  Resp: 16 (04/04/19 0737)  BP: (!) 166/77 (04/04/19 0737)  SpO2: (!) 94 % (04/04/19 0450) Vital Signs (24h Range):  Temp:  [97.2 °F (36.2 °C)-98.9 °F (37.2 °C)] 98.9 °F (37.2 °C)  Pulse:  [55-85] 65  Resp:  [16-18] 16  SpO2:  [94 %-97 %] 94 %  BP: (129-176)/(42-86) 166/77     Weight: 106.6 kg (235 lb 0.2 oz)  Body mass index is 31.01 kg/m².    Physical Exam   Constitutional: He appears well-developed and well-nourished. No distress.   HENT:   Head: Normocephalic and atraumatic.   Mouth/Throat: No oropharyngeal exudate.   Eyes: Pupils are equal, round, and reactive to light. EOM are normal. Right eye exhibits no discharge. Left eye exhibits no discharge.   Neck: Normal range of motion. Neck supple.   Cardiovascular: Normal rate and intact distal pulses.   No murmur heard.  Pulmonary/Chest: Effort normal and breath sounds normal. No stridor. No respiratory distress. He has no wheezes. He exhibits no tenderness.   Abdominal: Soft. Bowel sounds are normal. There is no tenderness. There is no guarding.   Musculoskeletal: He exhibits no edema or tenderness.   Neurological: He is  alert.   Follows commands. Mostly nods to yes or no questions. Aphasic, dysarthria   Skin: Skin is warm and dry. He is not diaphoretic. There is erythema.   R groin hematoma extending to scrotum/pelvis, RUE and R foot bruising. Mild erythema to R forearm near wrist with vesicle, no TTP, no drainage --stable from yesterday   Psychiatric: He has a normal mood and affect. His behavior is normal.   Vitals reviewed.      Significant Labs:   CBC:   Recent Labs   Lab 04/02/19 2052 04/03/19 0445 04/04/19 0328   WBC 6.81 6.13 6.58   HGB 7.8* 7.7* 7.8*   HCT 23.1* 23.4* 23.8*   PLT 98* 106* 118*     CMP:   Recent Labs   Lab 04/03/19 0445 04/04/19 0328    137   K 4.3 4.3    106   CO2 22* 24    103   BUN 49* 25*   CREATININE 5.6* 3.8*   CALCIUM 7.5* 8.5*   PROT 5.1* 5.3*   ALBUMIN 2.7* 2.7*   BILITOT 0.9 1.3*   ALKPHOS 85 90   AST 17 19   ALT 23 23   ANIONGAP 9 7*   EGFRNONAA 8.9* 14.2*       Significant Imaging: I have reviewed all pertinent imaging results/findings within the past 24 hours.    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 No TTP  Drop in Hgb (8.6 > 7.7 > 7.8), pt had blood clots passed in urine  LE U/S negative for any fluid collection, aneurysm, or stenosis    Plan:  Cont to monitor daily CBCs  Cont to monitor size          Cellulitis of right arm  R forearm edema, mild erythema  Arterial line removed, evaluated by ortho for compartment syndrome, negative  Started on clindamycin 300 mg q6hrs for possible cellulitis in the [facility] on 3/30  4/1 night with low grade fever T 100.6, hypotension x 1 though hypertensive otherwise  4/2 edema worsening, primary team d/c'd  clindamycin and started IV vancomycin x 1 and cefepime. Random vanc level in the AM  4/3 erythema stable, random vanc 13.5, procal 0.41    Plan:   Continue on oral Doxycycline 100 mg BID for total 7 days (last dose will be on 04/09/19)  Elevate R arm   RUE U/S negative for DVT        Paroxysmal atrial fibrillation  Per primary team. Risk factor for stroke  CHADsVASc 7  Coreg for rate control, coumadin 7.5 mg daily per primary team  At goal INR 2-3, INR 2.0        ESRD (end stage renal disease) on dialysis  Nephrology following, HD MWF      Type 2 diabetes mellitus, without long-term current use of insulin  Hgb A1c 8.3  Per primary team, detemir 8 u qHS, MDSS, POCT glc q6hrs          Benign prostatic hyperplasia without lower urinary tract symptoms  -Cont flomax and finasteride  -Has been making urine and several episodes of incontinence, documented unmeasured urine per nursing. Frequency of urination has decreased as PO intake has decreased but pt is still making urine and soaking the pad  -UA negative for infection  -Noted bloody clots passed with urine per nursing. Urology consulted.   -Per Urology recs,  - Check PVR after patient is able to void  - Please call if residuals are over 300 cc  - Continue to monitor, suspect that bleeding is from prostate and will self resolve   - Patient may follow up with Dr. Payne as outpatient for discussion of hematuria workup  -Cont to monitor    Essential hypertension  Risk factor for stroke, primary team goal SBP <160  Cont Torsemide 20 mg q12, losartan 100 mg qd, and Carvedilol 25 mg q12                 VTE Risk Mitigation (From admission, onward)        Ordered     warfarin tablet 7.5 mg  Daily      04/03/19 0923     IP VTE HIGH RISK PATIENT  Once      03/22/19 2207              Trupti Reinoso MD  Department of Hospital Medicine   [facility]

## 2019-04-04 NOTE — SUBJECTIVE & OBJECTIVE
Neurologic Chief Complaint: RSW + difficulty with speech    Subjective:     Interval History:   will place condom catheter for hematuria monitoring, H&H stable. Hospital medicine helping with cellulitis. cpap ordered for night      HPI, Past Medical, Family, and Social History remains the same as documented in the initial encounter.     Review of Systems   Constitutional: Positive for fatigue. Negative for diaphoresis and fever.   Eyes: Positive for visual disturbance.   Genitourinary: Positive for hematuria.   Skin: Positive for color change.   Neurological: Positive for facial asymmetry, speech difficulty and weakness. Negative for seizures and light-headedness.   Psychiatric/Behavioral: Negative for agitation.     Scheduled Meds:   sodium chloride 0.9%   Intravenous Once    allopurinol  100 mg Oral Daily    atorvastatin  80 mg Oral Daily    carvedilol  25 mg Oral BID WM    doxycycline  100 mg Oral Q12H    epoetin kitty (PROCRIT) injection  10,000 Units Intravenous Every Mon, Wed, Fri    finasteride  5 mg Oral Daily    gabapentin  300 mg Oral QHS    insulin detemir U-100  8 Units Subcutaneous Daily    losartan  100 mg Oral QHS    pantoprazole  40 mg Oral Daily    polyethylene glycol  17 g Oral Daily    senna-docusate 8.6-50 mg  1 tablet Oral BID    tamsulosin  1 capsule Oral Daily    torsemide  20 mg Oral BID    warfarin  7.5 mg Oral Daily     Continuous Infusions:    PRN Meds:sodium chloride, sodium chloride 0.9%, acetaminophen, albuterol-ipratropium, dextrose 50%, dextrose 50%, glucagon (human recombinant), glucose, glucose, insulin aspart U-100, nitroGLYCERIN, ondansetron, ramelteon    Objective:     Vital Signs (Most Recent):  Temp: 98.6 °F (37 °C) (04/04/19 1545)  Pulse: 68 (04/04/19 1545)  Resp: 16 (04/04/19 1545)  BP: 128/62 (04/04/19 1545)  SpO2: 96 % (04/04/19 1545)  BP Location: Right leg    Vital Signs Range (Last 24H):  Temp:  [97.2 °F (36.2 °C)-98.9 °F (37.2 °C)]   Pulse:  [55-85]    Resp:  [16-18]   BP: (128-176)/(62-86)   SpO2:  [94 %-97 %]   BP Location: Right leg    Physical Exam   Constitutional: He appears well-developed and well-nourished. No distress.   HENT:   Head: Normocephalic and atraumatic.   Cardiovascular: Normal rate. An irregularly irregular rhythm present.   Pulmonary/Chest: Effort normal. No accessory muscle usage. No respiratory distress.   Genitourinary:   Genitourinary Comments: Hematoma in the R groin, extending into scrotum and pelvic region. Soft, non tender   Skin: Skin is warm. He is not diaphoretic.   Nursing note and vitals reviewed.    Neurological Exam:   LOC: very drowsy, awakes temporarily but falls back to sleep. Does follow commands when awake    Language and articulation:expressive (nods appropriately) and receptive  dysarthria    Visual Fields: no blink to threat right    Decreased sensation on right   Motor: right upper extremity 1/5 (some wrist movement today), RLE 2/5  Left side 4/5     Laboratory:  Recent Results (from the past 24 hour(s))   POCT glucose    Collection Time: 04/03/19  8:41 PM   Result Value Ref Range    POCT Glucose 136 (H) 70 - 110 mg/dL   Comprehensive metabolic panel    Collection Time: 04/04/19  3:28 AM   Result Value Ref Range    Sodium 137 136 - 145 mmol/L    Potassium 4.3 3.5 - 5.1 mmol/L    Chloride 106 95 - 110 mmol/L    CO2 24 23 - 29 mmol/L    Glucose 103 70 - 110 mg/dL    BUN, Bld 25 (H) 8 - 23 mg/dL    Creatinine 3.8 (H) 0.5 - 1.4 mg/dL    Calcium 8.5 (L) 8.7 - 10.5 mg/dL    Total Protein 5.3 (L) 6.0 - 8.4 g/dL    Albumin 2.7 (L) 3.5 - 5.2 g/dL    Total Bilirubin 1.3 (H) 0.1 - 1.0 mg/dL    Alkaline Phosphatase 90 55 - 135 U/L    AST 19 10 - 40 U/L    ALT 23 10 - 44 U/L    Anion Gap 7 (L) 8 - 16 mmol/L    eGFR if African American 16.4 (A) >60 mL/min/1.73 m^2    eGFR if non  14.2 (A) >60 mL/min/1.73 m^2   Protime-INR    Collection Time: 04/04/19  3:28 AM   Result Value Ref Range    Prothrombin Time 19.6 (H)  9.0 - 12.5 sec    INR 2.0 (H) 0.8 - 1.2   CBC auto differential    Collection Time: 04/04/19  3:28 AM   Result Value Ref Range    WBC 6.58 3.90 - 12.70 K/uL    RBC 2.49 (L) 4.60 - 6.20 M/uL    Hemoglobin 7.8 (L) 14.0 - 18.0 g/dL    Hematocrit 23.8 (L) 40.0 - 54.0 %    MCV 96 82 - 98 fL    MCH 31.3 (H) 27.0 - 31.0 pg    MCHC 32.8 32.0 - 36.0 g/dL    RDW 15.6 (H) 11.5 - 14.5 %    Platelets 118 (L) 150 - 350 K/uL    MPV 10.1 9.2 - 12.9 fL    Immature Granulocytes 1.1 (H) 0.0 - 0.5 %    Gran # (ANC) 4.5 1.8 - 7.7 K/uL    Immature Grans (Abs) 0.07 (H) 0.00 - 0.04 K/uL    Lymph # 1.3 1.0 - 4.8 K/uL    Mono # 0.6 0.3 - 1.0 K/uL    Eos # 0.1 0.0 - 0.5 K/uL    Baso # 0.02 0.00 - 0.20 K/uL    nRBC 1 (A) 0 /100 WBC    Gran% 68.7 38.0 - 73.0 %    Lymph% 20.2 18.0 - 48.0 %    Mono% 8.5 4.0 - 15.0 %    Eosinophil% 1.2 0.0 - 8.0 %    Basophil% 0.3 0.0 - 1.9 %    Differential Method Automated    Phosphorus    Collection Time: 04/04/19  3:28 AM   Result Value Ref Range    Phosphorus 2.8 2.7 - 4.5 mg/dL   Iron and TIBC    Collection Time: 04/04/19  3:28 AM   Result Value Ref Range    Iron 31 (L) 45 - 160 ug/dL    Transferrin 199 (L) 200 - 375 mg/dL    TIBC 295 250 - 450 ug/dL    Saturated Iron 11 (L) 20 - 50 %   Ferritin    Collection Time: 04/04/19  3:28 AM   Result Value Ref Range    Ferritin 469 (H) 20.0 - 300.0 ng/mL   POCT glucose    Collection Time: 04/04/19  7:32 AM   Result Value Ref Range    POCT Glucose 116 (H) 70 - 110 mg/dL   POCT glucose    Collection Time: 04/04/19 11:27 AM   Result Value Ref Range    POCT Glucose 160 (H) 70 - 110 mg/dL   POCT glucose    Collection Time: 04/04/19  4:33 PM   Result Value Ref Range    POCT Glucose 135 (H) 70 - 110 mg/dL       Diagnostic Results     Brain Imaging   CTH non-contrast (3/28/2019):   Evolving subacute to chronic appearing infarcts in L thalamus and L PCA territory  small remote lacune in R putamen.  Cytotoxic cerebral edema    MRI Brain (3/26/19):  New acute left PCA territory  distribution infarctions involving L thalamus and L paramedian occipital lobe, as well as L splenium of the corpus callosum   Generalized cerebral volume loss   significant chronic microvascular ischemic disease.      MRI Brain (3/24/19):  Vascular findings similar to CTH and CTA    Vessel Imaging   CTA-MP (3/24/19):  Extensive intracranial atherosclerotic disease:  Left PCA with a high-grade stenosis. High-grade (>70%) stenosis right proximal ICA and moderate (50-70%) stenosis left proximal ICA. Moderate to high-grade stenosis of the intra cavernous/ supraclinoid ICAs bilaterally. High-grade stenosis distal left vertebral artery.    Cardiac Imaging   TTE (3/23/19):  EF 55%, LVH  severe bilateral atrial enlargement   PA pressure  41   PFO with left to right shunting

## 2019-04-04 NOTE — PLAN OF CARE
Problem: SLP Goal  Goal: SLP Goal  Goals to be met 4/8  1 pt will tolerate regular diet and thin liquids/met  2 pt will participate in speech lang eval/met  3.  [place] to time and place  4.  Respond to categorization tasks with 80% accuracy  5.  Assess functional reading and writing skills  6.  Respond to problem solving tasks with 80% accuracy       Reviewed word finding strategies with pt and family.    KRISTIN Taveras, CCC-SLP  4/4/2019

## 2019-04-04 NOTE — ASSESSMENT & PLAN NOTE
Complicated by low platelets, need for anticoagulation  Urology consulted   q6 hour bladder scans for retention   Condom cath ordered - planning to monitor at this point.   Yellow urine today noted by nurse

## 2019-04-04 NOTE — ASSESSMENT & PLAN NOTE
Sleep study in 2014  CONCLUSION:  Nocturnal polysomnography demonstrates:  1.  Obstructive sleep apnea to be present with 44.2 events an hour with   desaturation to 81%.  2.  Sinus rhythm with occasional PVC.    Apparently lost to follow up- per daughter, reported that no one ever called to tell him report   Snoring in the hospital overnight   Plan to try cpap in hospital tonight   Stroke risk factor

## 2019-04-04 NOTE — ASSESSMENT & PLAN NOTE
Seen by cards on 3/25/19   Continue medical management   Does not complain of chest pain at this time

## 2019-04-04 NOTE — PROGRESS NOTES
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 Fluctuating neurological examination with RUE weakness, dysarthria and aphasia.  New stroke seen on repeat imaging.  He has significant intracranial/extrancranial atherosclerotic disease seen on CTA head/neck. Etiology cardioembolic versus atheroembolic.      - Anticoagulants: Coumadin daily adjusting for INR goal of 2.0-3.0   - Statins: Atorvastatin- 80 mg daily  -Diagnostic studies pending: INR and cultures pending   - Aggressive risk factor modification: HTN, DM, HLD, A-Fib, CAD, intracranial atherosclerosis   - Rehab efforts: PT/OT to evaluate and treat ->recommending Rehab, SLP -> recommending Regular diet/nectar thick  - VTE prophylaxis: anticoagulated  - BP parameters: target sBP<160      Umbilical hernia  + abdominal pain during hospitalization  CT scan ordered showed umbilical hernia  General surgery consulted and recommended outpatient follow up     Cytotoxic cerebral edema  Large area of cytotoxic cerebral edema identified when reviewing brain imaging in the territory of the left posterior cerebral artery. There is mass effect associated with it. We will continue to monitor the patients clinical exam for any worsening of symptoms which may indicate expansion of the stroke or the area of the edema resulting in the clinical change. The pattern is suggestive of atheroembolic etiology.        Hemoglobin drop  Within the last 3 days from 8.2 >7.9 >7.1    - Discontinued heparin gtt, now coumadin   - will transfuse with pRBC x1 (consent obtained from daughter Tonya Hunt via phone)  - continue to monitor     Groin hematoma  Monitoring daily,continue to worsen  H/h decreasing Discontinued Heparin gtt  Stat right femoral arterial ultrasound  ordered and negative for vasculature abnormalities     Dysarthria due to acute cerebrovascular accident (CVA)  - recommend patient be re-evaluated by SLP  - recommend regular diet with nectar thick   When awake     Cellulitis of right arm  R forearm edema   Arterial line removed, evaluated by ortho for compartment syndrome which was negative as well as US  Stated on Clindamycin 300 q6 for possible cellulitis in the Neuro ICU (3/30), will continue x5 days  04/02 Edema worsening started IV vancomycin and cefepime.  Vanc one dose given today and vanc level will be drawn one hour tomorrow before dialysis is completed.    Defer to pharmacy after to decide what dosing to give.      Patent foramen ovale  Risk factor for stroke  Seen on echocardiogram     Paroxysmal atrial fibrillation  Risk factor for stroke   CHADsVASc 7    - Carvedilol for rate control   - Heparin gtt discontinued due to drop in Hgb  Therapeutic on 7.5 mg daily     ESRD (end stage renal disease) on dialysis  Patient is being followed by nephrology, appreciate their assistance  Continue scheduled HD, MWF        NSTEMI (non-ST elevated myocardial infarction)  Seen by cards on 3/25/19   Continue medical management   Does not complain of chest pain at this time     Type 2 diabetes mellitus, without long-term current use of insulin  Poorly controlled, A1C 8.3  Current glucose running ~100-150  Detemir 8 units daily  MD SSI  POCt glucose q6 hr    Long term (current) use of anticoagulants  - coumadin daily  INR 2 today     Benign prostatic hyperplasia without lower urinary tract symptoms  H/o and CT showed enlarged prostate   - continue flomax and finasteride  -has been making urine and several episodes of incontinence per sister in law but has not urinated since yesterday  -u/a negative, bladder scan   - hematuria today with clots - urology consulted , condom cath ordered today   -q 6 hour bladder scans     Anemia of chronic disease  Due to ESRD   receives EPO  every 2 weeks   H/h continues to trend down.  Blood transfusion 03/31 1unit  Stable currently - also with hematuria now      Obstructive sleep apnea  Sleep study in 2014  CONCLUSION:  Nocturnal polysomnography demonstrates:  1.  Obstructive sleep apnea to be present with 44.2 events an hour with   desaturation to 81%.  2.  Sinus rhythm with occasional PVC.    Apparently lost to follow up- per daughter, reported that no one ever called to tell him report   Snoring in the hospital overnight   Plan to try cpap in hospital tonight   Stroke risk factor     Gout, arthritis  - continue allopurinol    CAD (coronary artery disease), 2V CABG 2007  Risk factor for stroke  Continue statin.   On coumadin daily - 7.5 mg   INR 2 today     Hyperlipidemia  LDL 46  Continue atorvastatin 80 mg due to extensive intracranial atherosclerotic disease      Essential hypertension  Risk factor for stroke   SBP<160  continue Torsemide 20 mg q12, losartan 100 mg qd, and Carvedilol 25 mg q12        Carotid artery stenosis  Noted CTA and carotid US  Patient evaluated by vascular surgery, recommended maximal medical management    Hematuria  Complicated by low platelets, need for anticoagulation  Urology consulted   q6 hour bladder scans for retention   Condom cath ordered - planning to monitor at this point.   Yellow urine today noted by nurse          Admitted to hospital medicine for unstable angina eval, vascular neurology consulted for right sided weakness, facial droop and dysarthria, MRI showed L PCA infarction, not candidate for TPA ( symptoms duration >4 hours)patient symptoms improved with laying flat, admitted to neuro critical care. Patient with known PCA infarct on L with undulating symptom pattern with modification to level of recumbency. Patient restarted on heparin gtt. Underwent US of LE and of carotid with evidence of <50% L ICA stenosis and 60-70% R ICA stenosis, heavily calcified on CTA, additionally L vertebral also heavily  calcified. Patient tolerated HD. Neuro deficit appear to be more severe on 3/27 with decreasing right arm strength now 2/5 and with worsening dysarthria/aphasia. Patient with gross swelling of R forearm - tense, pulses intact.   03/29/2019 patient with worsening of neurological deficit, unable to move right upper extremity, patient with pain on passive movement of right fingers exhibited by facial grimace as verbal response to pain/sensation/light touch no longer reliable, aphasia/dysarthria worsening, RLE with 2/5 strength today, patient evaluated by orthopedic surgery regarding R forearm yesterday and today and feel that this is not compartment syndrome and will continue to monitor, distal pulses remain palpable  3/30: NAEON. On heparin gtt. Plans to step down  3/31: stepped down to VN without major events. transfused with pRBC x1 due to drop in Hbg. Discontinued heparin gtt. INR increased to 1.2, increased warfarin to 7.5  04/01/19 Visited patient in dialysis today.  On coumadin and atorvastatin for secondary stroke prevention H/H stabilized at 7.6 after 1 unit pRBC transfusion on 03/31.  Epo given during dialysis.  Patient has worsening right groin hematoma.  Stat right femoral artery u/s ordered.  On clindamycin for suspected right arm cellulitis.  Sister-in-law concern for urination.  Patient was incontinent and making urine but today has not had any urination.  U/A and bladder scan ordered   04/02/19 hospital medicine consulted for multiple medical issues. Right arm edema worsening.  Switching antibiotics from oral to IV     4/3/19 - drowsy today, less interactive. Plan for dialysis today. Noted to have gross hematuria with clots , bladder scan 315.   4/4/19 - will place condom catheter for hematuria monitoring, H&H stable. Hospital medicine helping with cellulitis. cpap ordered for night    STROKE DOCUMENTATION        NIH Scale:  1a. Level of Consciousness: 1-->Not alert, but arousable by minor stimulation  to obey, answer, or respond  1b. LOC Questions: 2-->Answers neither question correctly  1c. LOC Commands: 0-->Performs both tasks correctly  2. Best Gaze: 0-->Normal  3. Visual: 2-->Complete hemianopia  4. Facial Palsy: 0-->Normal symmetrical movements  5a. Motor Arm, Left: 1-->Drift, limb holds 90 (or 45) degrees, but drifts down before full 10 seconds, does not hit bed or other support  5b. Motor Arm, Right: 3-->No effort against gravity, limb falls  6a. Motor Leg, Left: 1-->Drift, leg falls by the end of the 5-sec period but does not hit bed  6b. Motor Leg, Right: 3-->No effort against gravity, leg falls to bed immediately  7. Limb Ataxia: 0-->Absent  8. Sensory: 1-->Mild-to-moderate sensory loss, patient feels pinprick is less sharp or is dull on the affected side, or there is a loss of superficial pain with pinprick, but patient is aware of being touched  9. Best Language: 1-->Mild-to-moderate aphasia, some obvious loss of fluency or facility of comprehension, without significant limitation on ideas expressed or form of expression. Reduction of speech and/or comprehension, however, makes conversation. . . (see row details)  10. Dysarthria: 1-->Mild-to-moderate dysarthria, patient slurs at least some words and, at worst, can be understood with some difficulty  11. Extinction and Inattention (formerly Neglect): 0-->No abnormality  Total (NIH Stroke Scale): 16       Modified [place]    Sachin Coma Scale:    ABCD2 Score:    [id number] Score:8  HAS -BLED Score:   ICH Score:   Hunt & Sharp Classification:      Hemorrhagic change of an Ischemic Stroke: Does this patient have an ischemic stroke with hemorrhagic changes? No     Neurologic Chief Complaint: RSW + difficulty with speech    Subjective:     Interval History:   will place condom catheter for hematuria monitoring, H&H stable. Hospital medicine helping with cellulitis. cpap ordered for night      HPI, Past Medical, Family, and Social History remains the same  as documented in the initial encounter.     Review of Systems   Constitutional: Positive for fatigue. Negative for diaphoresis and fever.   Eyes: Positive for visual disturbance.   Genitourinary: Positive for hematuria.   Skin: Positive for color change.   Neurological: Positive for facial asymmetry, speech difficulty and weakness. Negative for seizures and light-headedness.   Psychiatric/Behavioral: Negative for agitation.     Scheduled Meds:   sodium chloride 0.9%   Intravenous Once    allopurinol  100 mg Oral Daily    atorvastatin  80 mg Oral Daily    carvedilol  25 mg Oral BID WM    doxycycline  100 mg Oral Q12H    epoetin kitty (PROCRIT) injection  10,000 Units Intravenous Every Mon, Wed, Fri    finasteride  5 mg Oral Daily    gabapentin  300 mg Oral QHS    insulin detemir U-100  8 Units Subcutaneous Daily    losartan  100 mg Oral QHS    pantoprazole  40 mg Oral Daily    polyethylene glycol  17 g Oral Daily    senna-docusate 8.6-50 mg  1 tablet Oral BID    tamsulosin  1 capsule Oral Daily    torsemide  20 mg Oral BID    warfarin  7.5 mg Oral Daily     Continuous Infusions:    PRN Meds:sodium chloride, sodium chloride 0.9%, acetaminophen, albuterol-ipratropium, dextrose 50%, dextrose 50%, glucagon (human recombinant), glucose, glucose, insulin aspart U-100, nitroGLYCERIN, ondansetron, ramelteon    Objective:     Vital Signs (Most Recent):  Temp: 98.6 °F (37 °C) (04/04/19 1545)  Pulse: 68 (04/04/19 1545)  Resp: 16 (04/04/19 1545)  BP: 128/62 (04/04/19 1545)  SpO2: 96 % (04/04/19 1545)  BP Location: Right leg    Vital Signs Range (Last 24H):  Temp:  [97.2 °F (36.2 °C)-98.9 °F (37.2 °C)]   Pulse:  [55-85]   Resp:  [16-18]   BP: (128-176)/(62-86)   SpO2:  [94 %-97 %]   BP Location: Right leg    Physical Exam   Constitutional: He appears well-developed and well-nourished. No distress.   HENT:   Head: Normocephalic and atraumatic.   Cardiovascular: Normal rate. An irregularly irregular rhythm present.    Pulmonary/Chest: Effort normal. No accessory muscle usage. No respiratory distress.   Genitourinary:   Genitourinary Comments: Hematoma in the R groin, extending into scrotum and pelvic region. Soft, non tender   Skin: Skin is warm. He is not diaphoretic.   Nursing note and vitals reviewed.    Neurological Exam:   LOC: very drowsy, awakes temporarily but falls back to sleep. Does follow commands when awake    Language and articulation:expressive (nods appropriately) and receptive  dysarthria    Visual Fields: no blink to threat right    Decreased sensation on right   Motor: right upper extremity 1/5 (some wrist movement today), RLE 2/5  Left side 4/5     Laboratory:  Recent Results (from the past 24 hour(s))   POCT glucose    Collection Time: 04/03/19  8:41 PM   Result Value Ref Range    POCT Glucose 136 (H) 70 - 110 mg/dL   Comprehensive metabolic panel    Collection Time: 04/04/19  3:28 AM   Result Value Ref Range    Sodium 137 136 - 145 mmol/L    Potassium 4.3 3.5 - 5.1 mmol/L    Chloride 106 95 - 110 mmol/L    CO2 24 23 - 29 mmol/L    Glucose 103 70 - 110 mg/dL    BUN, Bld 25 (H) 8 - 23 mg/dL    Creatinine 3.8 (H) 0.5 - 1.4 mg/dL    Calcium 8.5 (L) 8.7 - 10.5 mg/dL    Total Protein 5.3 (L) 6.0 - 8.4 g/dL    Albumin 2.7 (L) 3.5 - 5.2 g/dL    Total Bilirubin 1.3 (H) 0.1 - 1.0 mg/dL    Alkaline Phosphatase 90 55 - 135 U/L    AST 19 10 - 40 U/L    ALT 23 10 - 44 U/L    Anion Gap 7 (L) 8 - 16 mmol/L    eGFR if African American 16.4 (A) >60 mL/min/1.73 m^2    eGFR if non  14.2 (A) >60 mL/min/1.73 m^2   Protime-INR    Collection Time: 04/04/19  3:28 AM   Result Value Ref Range    Prothrombin Time 19.6 (H) 9.0 - 12.5 sec    INR 2.0 (H) 0.8 - 1.2   CBC auto differential    Collection Time: 04/04/19  3:28 AM   Result Value Ref Range    WBC 6.58 3.90 - 12.70 K/uL    RBC 2.49 (L) 4.60 - 6.20 M/uL    Hemoglobin 7.8 (L) 14.0 - 18.0 g/dL    Hematocrit 23.8 (L) 40.0 - 54.0 %    MCV 96 82 - 98 fL    MCH 31.3  (H) 27.0 - 31.0 pg    MCHC 32.8 32.0 - 36.0 g/dL    RDW 15.6 (H) 11.5 - 14.5 %    Platelets 118 (L) 150 - 350 K/uL    MPV 10.1 9.2 - 12.9 fL    Immature Granulocytes 1.1 (H) 0.0 - 0.5 %    Gran # (ANC) 4.5 1.8 - 7.7 K/uL    Immature Grans (Abs) 0.07 (H) 0.00 - 0.04 K/uL    Lymph # 1.3 1.0 - 4.8 K/uL    Mono # 0.6 0.3 - 1.0 K/uL    Eos # 0.1 0.0 - 0.5 K/uL    Baso # 0.02 0.00 - 0.20 K/uL    nRBC 1 (A) 0 /100 WBC    Gran% 68.7 38.0 - 73.0 %    Lymph% 20.2 18.0 - 48.0 %    Mono% 8.5 4.0 - 15.0 %    Eosinophil% 1.2 0.0 - 8.0 %    Basophil% 0.3 0.0 - 1.9 %    Differential Method Automated    Phosphorus    Collection Time: 04/04/19  3:28 AM   Result Value Ref Range    Phosphorus 2.8 2.7 - 4.5 mg/dL   Iron and TIBC    Collection Time: 04/04/19  3:28 AM   Result Value Ref Range    Iron 31 (L) 45 - 160 ug/dL    Transferrin 199 (L) 200 - 375 mg/dL    TIBC 295 250 - 450 ug/dL    Saturated Iron 11 (L) 20 - 50 %   Ferritin    Collection Time: 04/04/19  3:28 AM   Result Value Ref Range    Ferritin 469 (H) 20.0 - 300.0 ng/mL   POCT glucose    Collection Time: 04/04/19  7:32 AM   Result Value Ref Range    POCT Glucose 116 (H) 70 - 110 mg/dL   POCT glucose    Collection Time: 04/04/19 11:27 AM   Result Value Ref Range    POCT Glucose 160 (H) 70 - 110 mg/dL   POCT glucose    Collection Time: 04/04/19  4:33 PM   Result Value Ref Range    POCT Glucose 135 (H) 70 - 110 mg/dL       Diagnostic Results     Brain Imaging   CTH non-contrast (3/28/2019):   Evolving subacute to chronic appearing infarcts in L thalamus and L PCA territory  small remote lacune in R putamen.  Cytotoxic cerebral edema    MRI Brain (3/26/19):  New acute left PCA territory distribution infarctions involving L thalamus and L paramedian occipital lobe, as well as L splenium of the corpus callosum   Generalized cerebral volume loss   significant chronic microvascular ischemic disease.      MRI Brain (3/24/19):  Vascular findings similar to CTH and CTA    Vessel Imaging    CTA-MP (3/24/19):  Extensive intracranial atherosclerotic disease:  Left PCA with a high-grade stenosis. High-grade (>70%) stenosis right proximal ICA and moderate (50-70%) stenosis left proximal ICA. Moderate to high-grade stenosis of the intra cavernous/ supraclinoid ICAs bilaterally. High-grade stenosis distal left vertebral artery.    Cardiac Imaging   TTE (3/23/19):  EF 55%, LVH  severe bilateral atrial enlargement   PA pressure  41   PFO with left to right shunting        NANCY Henderson  Comprehensive Stroke Center  Department of Vascular Neurology   [facility]

## 2019-04-04 NOTE — ASSESSMENT & PLAN NOTE
Risk factor for stroke   CHADsVASc 7    - Carvedilol for rate control   - Heparin gtt discontinued due to drop in Hgb  Therapeutic on 7.5 mg daily

## 2019-04-04 NOTE — ASSESSMENT & PLAN NOTE
-Cont flomax and finasteride  -Has been making urine and several episodes of incontinence, documented unmeasured urine per nursing. Frequency of urination has decreased as PO intake has decreased but pt is still making urine and soaking the pad  -UA negative for infection  -Noted bloody clots passed with urine per nursing. Urology consulted.   -Per Urology recs,  - Check PVR after patient is able to void  - Please call if residuals are over 300 cc  - Continue to monitor, suspect that bleeding is from prostate and will self resolve   - Patient may follow up with Dr. Payne as outpatient for discussion of hematuria workup  -Cont to monitor

## 2019-04-04 NOTE — PROGRESS NOTES
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  Bed mobility, transfers, and gait (I)/SV.  3/24/19:  Stroke code.  3/27/19:  Evaluated by SLP.  Found to have dysarthria and cognitive-linguistic impairment.  SLP recommendation: regular diet and thin liquids.  3/28/19:  PT and OT on hold for decline.  SLP changed recommendation to regular diet and nectar thick liquids for decline/dysphagia.   3/29/19:  Evaluated by PT.  Bed mobility max-totalA.  EOB modA-CGA.  Sit to stand deferred 2/2 LE weakness and difficulty following instructions.    3/30/19:  Evaluated by OT.  Bed mobility mod-totalA.  No functional mobility/transfers 2/2 poor sitting balance.  Grooming modA and UBD maxA while seated EOB.  LBD totalA.   4/1/19:  No PT, OT, or SLP 2/2 HD.   4/2/19:  Declined PT and OT.    4/3/19:  Participated with SLP.  No PT or OT 2/2 HD.     Interval History 4/4/2019:  Patient is seen for follow-up rehab evaluation and recommendations: No therapy yesterday 2/2 HD.  Urology evaluated by hematuria yesterday.     HPI, Past Medical, Family, and Social History remains the same as documented in the initial encounter.    Scheduled Medications:    sodium chloride 0.9%   Intravenous Once    allopurinol  100 mg Oral Daily    atorvastatin  80 mg Oral Daily    carvedilol  25 mg Oral BID WM    doxycycline  100 mg Oral Q12H    epoetin kitty (PROCRIT) injection  10,000 Units Intravenous Every Mon, Wed, Fri    finasteride  5 mg Oral Daily    gabapentin  300 mg Oral QHS    insulin detemir U-100  8 Units Subcutaneous Daily    losartan  100 mg Oral QHS    pantoprazole  40 mg Oral Daily    polyethylene glycol  17 g Oral  Daily    senna-docusate 8.6-50 mg  1 tablet Oral BID    tamsulosin  1 capsule Oral Daily    torsemide  20 mg Oral BID    warfarin  7.5 mg Oral Daily     PRN Medications: sodium chloride, sodium chloride 0.9%, acetaminophen, albuterol-ipratropium, dextrose 50%, dextrose 50%, glucagon (human recombinant), glucose, glucose, insulin aspart U-100, nitroGLYCERIN, ondansetron, ramelteon    Review of Systems   Constitutional: Positive for activity change and fatigue. Negative for fever.   HENT: Positive for trouble swallowing. Negative for drooling and hearing loss.    Eyes: Positive for visual disturbance. Negative for pain.   Respiratory: Negative for cough, shortness of breath and wheezing.    Cardiovascular: Negative for chest pain and palpitations.   Gastrointestinal: Negative for abdominal pain, nausea and vomiting.   Genitourinary: Positive for difficulty urinating. Negative for flank pain.   Musculoskeletal: Positive for arthralgias and myalgias. Negative for back pain and neck pain.   Skin: Positive for wound. Negative for rash.   Neurological: Positive for speech difficulty and weakness. Negative for dizziness and headaches.   Psychiatric/Behavioral: Negative for agitation and hallucinations. The patient is nervous/anxious.      Objective:     Vital Signs (Most Recent):  Temp: 98.9 °F (37.2 °C) (04/04/19 0737)  Pulse: 65 (04/04/19 1100)  Resp: 16 (04/04/19 0737)  BP: (!) 166/77 (04/04/19 0737)  SpO2: (!) 94 % (04/04/19 0450)    Vital Signs (24h Range):  Temp:  [97.2 °F (36.2 °C)-98.9 °F (37.2 °C)] 98.9 °F (37.2 °C)  Pulse:  [55-85] 65  Resp:  [16-18] 16  SpO2:  [94 %-97 %] 94 %  BP: (129-176)/(42-86) 166/77     Physical Exam   Constitutional: He appears well-developed and well-nourished. No distress.   HENT:   Head: Normocephalic and atraumatic.   Right Ear: External ear normal.   Left Ear: External ear normal.   Nose: Nose normal.   + dysphonia   Eyes: Right eye exhibits no discharge. Left eye exhibits no  discharge. No scleral icterus.   Neck: Normal range of motion.   Cardiovascular: Normal rate and intact distal pulses.   Pulmonary/Chest: Effort normal. No respiratory distress. He has no wheezes.   Abdominal: Soft. He exhibits no distension. There is no tenderness.   Musculoskeletal: Normal range of motion. He exhibits no edema.        Right hip: He exhibits tenderness and swelling (hematoma).        Right forearm: He exhibits tenderness and swelling.   Neurological: He is alert. No sensory deficit.   Awake and alert.  Follows commands.  + aphasia and dysarthria.  R hemianopsia.  Right-sided weakness RUE>RLE.    Skin: Skin is warm and dry. No rash noted.   Psychiatric: His affect is blunt. Cognition and memory are impaired.   Vitals reviewed.    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      Recommendations  -  Encourage mobility, OOB in chair, and early ambulation as appropriate   -  PT/OT evaluate and treat  -  SLP speech and cognitive evaluate and treat  -  Monitor mood and sleep disturbances  -  Establish consistent sleep-wake cycle  -  Monitor for bowel and bladder dysfunction  -  Monitor for shoulder pain and subluxation  -  Monitor for spasticity  -  DVT prophylaxis  -  Monitor for and prevent skin breakdown and pressure ulcers  · Early mobility, repositioning/weight shifting every 20-30 minutes when sitting, turn patient every  2 hours, proper mattress/overlay and chair cushioning, pressure relief/heel protector boots    Cytotoxic cerebral edema  -  2/2 stroke    Dysarthria due to acute cerebrovascular accident (CVA)  -  2/2 stroke  -  SLP following    Cellulitis of right arm  -  Hospital Medicine following for co-management--> now on PO doxycyline     Paroxysmal atrial fibrillation  -  Stroke risk factor  -  Management per Vascular Neurology--> on Coumadin     ESRD (end stage renal disease) on dialysis  -  Nephrology following--> HD MWF    Patient with therapy needs.  Functionally low level.  No therapy yesterday 2/2 HD.  Will continue to follow therapy progress for final post-acute recommendation.    Amena Sanchez, ROSALBA  Department of Physical Medicine & Rehab   [facility]

## 2019-04-04 NOTE — ASSESSMENT & PLAN NOTE
R forearm edema, mild erythema  Arterial line removed, evaluated by ortho for compartment syndrome, negative  Started on clindamycin 300 mg q6hrs for possible cellulitis in the [facility] on 3/30  4/1 night with low grade fever T 100.6, hypotension x 1 though hypertensive otherwise  4/2 edema worsening, primary team d/c'd clindamycin and started IV vancomycin x 1 and cefepime. Random vanc level in the AM  4/3 erythema stable, random vanc 13.5, procal 0.41    Plan:   Continue on oral Doxycycline 100 mg BID for total 7 days (last dose will be on 04/09/19)  Elevate R arm   RUE U/S negative for DVT

## 2019-04-04 NOTE — PT/OT/SLP PROGRESS
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 Performance deficits affecting function are weakness, impaired self care skills, impaired balance, impaired functional mobilty, impaired endurance, gait instability, impaired cognition, decreased upper extremity function, decreased lower extremity function, impaired coordination, decreased ROM, decreased safety awareness, decreased coordination, abnormal tone, impaired fine motor. Pt tolerated session fairly well. Pt needing encouragement to participate with increased agitation noted as session continued. Pt remains good inpatient rehab candidate to allow for improved functional performance prior to d/c home.     Rehab Prognosis:  Fair; patient would benefit from acute skilled OT services to address these deficits and reach maximum level of function.       Plan:     Patient to be seen 4 x/week to address the above listed problems via self-care/home management, neuromuscular re-education, cognitive retraining, therapeutic activities, therapeutic exercises  · Plan of Care Expires: 04/28/19  · Plan of Care Reviewed with: patient, daughter    Subjective     Pain/Comfort:  · Pain Rating 1: 0/10    Objective:     Communicated with:nsg  Pt found supine in bed with daughter present.     General Precautions: Standard, fall, nectar thick   Orthopedic Precautions:N/A     Occupational Performance:     Bed Mobility:    Supine>sit with MAX A x 2  Sit>supine with MOD A       Activities of Daily Living:  Pt declined participation with basic g/h skills with increased agitation noted.      [facility] 6 Click ADL: 6    Treatment & Education:  Pt tolerated sitting EOB x 8 min with balance fluctuating from Poor to Fair. Initially, pt pushing self to the right  with his left  hand, but midline orientation improved as sitting EOB continued.  Right weight bearing on open hand while EOB to increase proprioceptive input to right UE.   Once supine, OT positioned right UE in elevated and shoulder supporting position. OT also provided towel roll with coban to allow for resting hand position to right hand. OT provided education to pt/daughter re: techniques to increase right side awareness as well as tactile stimulation to right hand and PROM to right UE.  Daughter receptive and verb understanding.  Education provided to pt and fly re: OT POC, purpose of therapy and safety with functional mobility/ADL skills. Daughter verb understanding of education but pt with limited understanding.   Patient left supine with all lines intact, call button in reach and fly presentEducation:      GOALS:   Multidisciplinary Problems     Occupational Therapy Goals        Problem: Occupational Therapy Goal    Goal Priority Disciplines Outcome Interventions   Occupational Therapy Goal     OT, PT/OT Ongoing (interventions implemented as appropriate)    Description:  Goals to be met by:  4/9/19    Patient will increase functional independence with ADLs by performing:    UE Dressing with Moderate Assistance using nikita-dressing technique.  Grooming while EOB with Moderate Assistance.  Rolling to Right with minimal assistance  Rolling to Left with Maximum Assistance.   Supine to sit with Maximum Assistance.  Toilet transfer to bedside commode with Maximum Assistance.  Pt/family demo and verbalized understanding of  BUE HEP and proper positioning in bed to prevent edema, contractures, and pressure ulcers.  Pt follow 100% of simple one-step commands with minimal cues provided.                        Time Tracking:     OT Date of Treatment:  04/04/19  OT Start Time: 0930  OT Stop Time: 1000  OT Total Time (min): 30 min    Billable Minutes:Neuromuscular Re-education 15    DIANE Ontiveros  4/4/2019

## 2019-04-04 NOTE — SUBJECTIVE & OBJECTIVE
Interval History 4/4/2019:  Patient is seen for follow-up rehab evaluation and recommendations: No therapy yesterday 2/2 HD.  Urology evaluated by hematuria yesterday.     HPI, Past Medical, Family, and Social History remains the same as documented in the initial encounter.    Scheduled Medications:    sodium chloride 0.9%   Intravenous Once    allopurinol  100 mg Oral Daily    atorvastatin  80 mg Oral Daily    carvedilol  25 mg Oral BID WM    doxycycline  100 mg Oral Q12H    epoetin kitty (PROCRIT) injection  10,000 Units Intravenous Every Mon, Wed, Fri    finasteride  5 mg Oral Daily    gabapentin  300 mg Oral QHS    insulin detemir U-100  8 Units Subcutaneous Daily    losartan  100 mg Oral QHS    pantoprazole  40 mg Oral Daily    polyethylene glycol  17 g Oral Daily    senna-docusate 8.6-50 mg  1 tablet Oral BID    tamsulosin  1 capsule Oral Daily    torsemide  20 mg Oral BID    warfarin  7.5 mg Oral Daily     PRN Medications: sodium chloride, sodium chloride 0.9%, acetaminophen, albuterol-ipratropium, dextrose 50%, dextrose 50%, glucagon (human recombinant), glucose, glucose, insulin aspart U-100, nitroGLYCERIN, ondansetron, ramelteon    Review of Systems   Constitutional: Positive for activity change and fatigue. Negative for fever.   HENT: Positive for trouble swallowing. Negative for drooling and hearing loss.    Eyes: Positive for visual disturbance. Negative for pain.   Respiratory: Negative for cough, shortness of breath and wheezing.    Cardiovascular: Negative for chest pain and palpitations.   Gastrointestinal: Negative for abdominal pain, nausea and vomiting.   Genitourinary: Positive for difficulty urinating. Negative for flank pain.   Musculoskeletal: Positive for arthralgias and myalgias. Negative for back pain and neck pain.   Skin: Positive for wound. Negative for rash.   Neurological: Positive for speech difficulty and weakness. Negative for dizziness and headaches.    Psychiatric/Behavioral: Negative for agitation and hallucinations. The patient is nervous/anxious.      Objective:     Vital Signs (Most Recent):  Temp: 98.9 °F (37.2 °C) (04/04/19 0737)  Pulse: 65 (04/04/19 1100)  Resp: 16 (04/04/19 0737)  BP: (!) 166/77 (04/04/19 0737)  SpO2: (!) 94 % (04/04/19 0450)    Vital Signs (24h Range):  Temp:  [97.2 °F (36.2 °C)-98.9 °F (37.2 °C)] 98.9 °F (37.2 °C)  Pulse:  [55-85] 65  Resp:  [16-18] 16  SpO2:  [94 %-97 %] 94 %  BP: (129-176)/(42-86) 166/77     Physical Exam   Constitutional: He appears well-developed and well-nourished. No distress.   HENT:   Head: Normocephalic and atraumatic.   Right Ear: External ear normal.   Left Ear: External ear normal.   Nose: Nose normal.   + dysphonia   Eyes: Right eye exhibits no discharge. Left eye exhibits no discharge. No scleral icterus.   Neck: Normal range of motion.   Cardiovascular: Normal rate and intact distal pulses.   Pulmonary/Chest: Effort normal. No respiratory distress. He has no wheezes.   Abdominal: Soft. He exhibits no distension. There is no tenderness.   Musculoskeletal: Normal range of motion. He exhibits no edema.        Right hip: He exhibits tenderness and swelling (hematoma).        Right forearm: He exhibits tenderness and swelling.   Neurological: He is alert. No sensory deficit.   Awake and alert.  Follows commands.  + aphasia and dysarthria.  R hemianopsia.  Right-sided weakness RUE>RLE.    Skin: Skin is warm and dry. No rash noted.   Psychiatric: His affect is blunt. Cognition and memory are impaired.   Vitals reviewed.    Diagnostic Results:   Labs: Reviewed  X-Ray: Reviewed  CT: Reviewed  MRI: Reviewed

## 2019-04-04 NOTE — SUBJECTIVE & OBJECTIVE
Past Medical History:   Diagnosis Date    NICK (acute kidney injury) 7/29/2016    Allergy     Anemia, mild 12/15/2014    Arthritis     Gout    Benign essential HTN 3/27/2012    BMI 29.0-29.9,adult 5/10/2018    BPH (benign prostatic hyperplasia)     BPH (benign prostatic hyperplasia)     CAD (coronary artery disease) 2006    Chronic kidney disease     due to ibuprofen    Colon polyp     CRF (chronic renal failure), stage 5     Diverticulosis     Gastritis     GERD (gastroesophageal reflux disease)     Gout     History of colon polyps 5/3/2018    HTN (hypertension) 3/27/2012    Hyperlipidemia     Hyperlipidemia     LLL pneumonia 6/14/2018    LVH (left ventricular hypertrophy)     Mesenteric ischemia     Murmur, cardiac 3/27/2012    GRICELDA (obstructive sleep apnea)     DOES NOT USE A MACHINE    Sinus problem     Syncope and collapse        Past Surgical History:   Procedure Laterality Date    APPENDECTOMY      BLOCK-NERVE Left 5/31/2016    [facility]    CARDIAC CATHETERIZATION      COLONOSCOPY  2011    COLONOSCOPY N/A 5/3/2018    [facility]O    COLONOSCOPY N/A 9/10/2015    [facility]    CORONARY ARTERY BYPASS GRAFT  4/2007    x 1    CYSTOSCOPY N/A 8/30/2017    [facility]    CYSTOSCOPY N/A 11/10/2015    [facility]    ESOPHAGOGASTRODUODENOSCOPY (EGD) N/A 1/4/2016    [facility] (2ND FLR)    ESOPHAGOGASTRODUODENOSCOPY (EGD) N/A 10/15/2014    [facility] Messi [facility] at [facility] ENDO    INJECTION-STEROID-EPIDURAL-TRANSFORAMINAL Left 2/25/2016    [facility] OR    JOINT REPLACEMENT      left knee total replacement  X 3    mid leftt finger      from a cactuss    MOLE REMOVAL  2016    RADIOFREQUENCY THERMOCOAGULATION (RFTC)-NERVE-MEDIAN BRANCH-LUMBAR Left  4/11/2016    [facility]    rotative cuff      no rotative cuffs on bilat shoulders has pins     SHOULDER SURGERY      shoulder surgery bilat  RIGHT X 4; LEFT X 3    TRANSRECTAL ULTRASOUND GUIDED PROSTATE BIOPSY Bilateral 11/10/2015    [facility]    ULTRASOUND-ENDOSCOPIC-UPPER N/A 5/31/2017    Performed by Tra [facility] at [facility] ENDO (2ND FLR)    ULTRASOUND-ENDOSCOPIC-UPPER N/A 1/4/2016    Performed by Tra [facility] at [facility] ENDO (2ND FLR)    ULTRASOUND-ENDOSCOPIC-UPPER N/A 1/16/2015    Performed by Jason Saleem MD at [facility] ENDO (2ND FLR)       Review of patient's allergies indicates:   Allergen Reactions    Ace inhibitors Other (See Comments)     Cough    Arb-angiotensin receptor antagonist Itching    Eplerenone Other (See Comments)     Marked bradycardia, 40, tiredness and weakness      Sulfa (sulfonamide antibiotics) Itching     Patient says this was 10 years ago and doesn't remember what happened       No current facility-administered medications on file prior to encounter.      Current Outpatient Medications on File Prior to Encounter   Medication Sig    allopurinol (ZYLOPRIM) 100 MG tablet Take 100 mg by mouth once daily.    amLODIPine (NORVASC) 10 MG tablet Take 10 mg by mouth once daily.    aspirin (ECOTRIN) 81 MG EC tablet Take 81 mg by mouth once daily.      atorvastatin (LIPITOR) 80 MG tablet TAKE 1 TABLET (80 MG TOTAL) BY MOUTH ONCE DAILY.    budesonide-formoterol 160-4.5 mcg (SYMBICORT) 160-4.5 mcg/actuation HFAA Inhale 2 puffs into the lungs every 12 (twelve) hours. Controller    carvedilol (COREG) 12.5 MG tablet Take 12.5 mg by mouth 2 (two) times daily with meals.    celecoxib (CELEBREX) 200 MG capsule Take 1 capsule (200 mg total) by mouth once daily.    finasteride (PROSCAR) 5 mg tablet TAKE 1 TABLET ONCE DAILY.    fluticasone (FLONASE) 50 mcg/actuation nasal spray 2 sprays (100 mcg total) by Each Nare route  once daily.    gabapentin (NEURONTIN) 300 MG capsule Take 1 capsule (300 mg total) by mouth every evening.    isosorbide mononitrate (ISMO,MONOKET) 10 mg tablet TAKE 1 TABLET BY MOUTH EVERY DAY IN THE EVENING    meclizine (ANTIVERT) 25 mg tablet Take 1 tablet (25 mg total) by mouth 3 (three) times daily as needed for Dizziness.    mirtazapine (REMERON) 7.5 MG Tab TAKE 1 TABLET BY MOUTH EVERY EVENING.    montelukast (SINGULAIR) 10 mg tablet Take 10 mg by mouth every evening.    NITROSTAT 0.4 mg SL tablet PLACE 1 TABLET (0.4 MG TOTAL) UNDER THE TONGUE EVERY 5 (FIVE) MINUTES AS NEEDED FOR CHEST PAIN.    omeprazole (PRILOSEC) 20 MG capsule TAKE 1 CAPSULE BY MOUTH EVERY DAY    tamsulosin (FLOMAX) 0.4 mg Cap TAKE 1 CAPSULE BY MOUTH EVERY DAY    tiotropium bromide (SPIRIVA RESPIMAT) 1.25 mcg/actuation Mist Inhale 2.5 mcg into the lungs once daily. Controller    torsemide (DEMADEX) 20 MG Tab Take 1 tablet (20 mg total) by mouth 2 (two) times daily.    warfarin (COUMADIN) 7.5 MG tablet Take 1 tablet (7.5 mg total) by mouth Daily.    zolpidem (AMBIEN) 5 MG Tab TAKE 1 TABLET BY MOUTH EVERY EVENING AS NEEDED    albuterol (PROVENTIL/VENTOLIN HFA) 90 mcg/actuation inhaler 2 puffs every 4 hours as needed for cough, wheeze, or shortness of breath    albuterol-ipratropium (DUO-NEB) 2.5 mg-0.5 mg/3 mL nebulizer solution Take 3 mLs by nebulization every 6 (six) hours as needed for Wheezing or Shortness of Breath.     Family History     Problem Relation (Age of Onset)    Cancer Father    Heart disease Mother, Sister    Hypertension Brother    Pneumonia Sister    Stroke Sister    Sudden death Father        Tobacco Use    Smoking status: Never Smoker    Smokeless tobacco: Never Used   Substance and Sexual Activity    Alcohol use: No    Drug use: No    Sexual activity: Not on file     Review of Systems   Constitutional: Negative for fatigue and fever.   HENT: Negative for congestion and rhinorrhea.    Eyes: Negative for  pain and visual disturbance.   Respiratory: Negative for cough, shortness of breath and wheezing.    Cardiovascular: Negative for chest pain, palpitations and leg swelling.   Gastrointestinal: Negative for abdominal pain, nausea and vomiting.   Genitourinary: Negative for difficulty urinating, dysuria and hematuria.   Musculoskeletal: Negative for arthralgias and back pain.   Skin: Positive for color change. Negative for pallor.   Neurological: Positive for facial asymmetry, speech difficulty, weakness (R sided) and numbness. Negative for dizziness and headaches.   Hematological: Negative for adenopathy. Bruises/bleeds easily.   Psychiatric/Behavioral: Positive for confusion and decreased concentration. Negative for agitation. The patient is not nervous/anxious.      Objective:     Vital Signs (Most Recent):  Temp: 98.9 °F (37.2 °C) (04/04/19 0737)  Pulse: 65 (04/04/19 1100)  Resp: 16 (04/04/19 0737)  BP: (!) 166/77 (04/04/19 0737)  SpO2: (!) 94 % (04/04/19 0450) Vital Signs (24h Range):  Temp:  [97.2 °F (36.2 °C)-98.9 °F (37.2 °C)] 98.9 °F (37.2 °C)  Pulse:  [55-85] 65  Resp:  [16-18] 16  SpO2:  [94 %-97 %] 94 %  BP: (129-176)/(42-86) 166/77     Weight: 106.6 kg (235 lb 0.2 oz)  Body mass index is 31.01 kg/m².    Physical Exam   Constitutional: He appears well-developed and well-nourished. No distress.   HENT:   Head: Normocephalic and atraumatic.   Mouth/Throat: No oropharyngeal exudate.   Eyes: Pupils are equal, round, and reactive to light. EOM are normal. Right eye exhibits no discharge. Left eye exhibits no discharge.   Neck: Normal range of motion. Neck supple.   Cardiovascular: Normal rate and intact distal pulses.   No murmur heard.  Pulmonary/Chest: Effort normal and breath sounds normal. No stridor. No respiratory distress. He has no wheezes. He exhibits no tenderness.   Abdominal: Soft. Bowel sounds are normal. There is no tenderness. There is no guarding.   Musculoskeletal: He exhibits no edema or  tenderness.   Neurological: He is alert.   Follows commands. Mostly nods to yes or no questions. Aphasic, dysarthria   Skin: Skin is warm and dry. He is not diaphoretic. There is erythema.   R groin hematoma extending to scrotum/pelvis, RUE and R foot bruising. Mild erythema to R forearm near wrist with vesicle, no TTP, no drainage --stable from yesterday   Psychiatric: He has a normal mood and affect. His behavior is normal.   Vitals reviewed.      Significant Labs:   CBC:   Recent Labs   Lab 04/02/19 2052 04/03/19 0445 04/04/19 0328   WBC 6.81 6.13 6.58   HGB 7.8* 7.7* 7.8*   HCT 23.1* 23.4* 23.8*   PLT 98* 106* 118*     CMP:   Recent Labs   Lab 04/03/19 0445 04/04/19 0328    137   K 4.3 4.3    106   CO2 22* 24    103   BUN 49* 25*   CREATININE 5.6* 3.8*   CALCIUM 7.5* 8.5*   PROT 5.1* 5.3*   ALBUMIN 2.7* 2.7*   BILITOT 0.9 1.3*   ALKPHOS 85 90   AST 17 19   ALT 23 23   ANIONGAP 9 7*   EGFRNONAA 8.9* 14.2*       Significant Imaging: I have reviewed all pertinent imaging results/findings within the past 24 hours.

## 2019-04-04 NOTE — ASSESSMENT & PLAN NOTE
Monitoring daily, stable from yesterday. No TTP  Drop in Hgb (8.6 > 7.7 > 7.8), pt had blood clots passed in urine  LE U/S negative for any fluid collection, aneurysm, or stenosis    Plan:  Cont to monitor daily CBCs  Cont to monitor size

## 2019-04-04 NOTE — PT/OT/SLP PROGRESS
Physical Therapy Treatment    Patient Name:  Micheal Hnut   MRN:  [id number]    Recommendations:     Discharge Recommendations:  rehabilitation facility   Discharge Equipment Recommendations: (TBD at next level of care)   Barriers to discharge: Inaccessible home, Decreased caregiver support and patient significantly below functional baseline    Assessment:     Micheal Hunt is a 78 y.o. 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  He presents with the following impairments/functional limitations:  weakness, impaired endurance, impaired functional mobilty, gait instability, impaired sensation, impaired balance, impaired cognition, decreased coordination, decreased lower extremity function, decreased upper extremity function, decreased safety awareness, abnormal tone, impaired coordination, impaired fine motor, edema, impaired cardiopulmonary response to activity.  The patient needed significant encouragement to participate in therapy due to fatigue and drowsiness. He has R sided hemiparesis with R upper extremity swelling due to cellulitis, decreased attention towards R side. Patient tolerated sitting edge of the bed about 8 minutes with variable assistance before demonstrating increased agitation and insisting on laying back down. He is safe for a draw sheet transfer to the [facility] chair with RN at this time. Based on his current level of mobility and prior level of independence, he is an excellent candidate for rehab in order to maximize his functional potential.     Rehab Prognosis: Good; patient would benefit from acute skilled PT services to address these deficits and reach maximum level of function.    Recent Surgery: Procedure(s) (LRB):  Left heart cath (Left)      Plan:     During this hospitalization, patient to be seen 4 x/week to address the identified rehab impairments via gait training, therapeutic activities, therapeutic exercises,  "neuromuscular re-education and progress toward the following goals:    · Plan of Care Expires:  04/29/19    Subjective     Chief Complaint: "I'm tired", "lay me back down"  Patient/Family Comments/goals: family encouraging mobility with therapy  Pain/Comfort:  · Pain Rating 1: 0/10      Objective:     Communicated with RN prior to session.  Patient found supine with bed alarm, peripheral IV, telemetry, SCD, pressure relief boots upon PT entry to room.     General Precautions: Standard, aphasia, fall, nectar thick   Orthopedic Precautions:N/A   Braces: N/A     Functional Mobility:    Bed Mobility  Rolling to R: maximum assistance x2  Supine to Sit:  maximum assistance x2  Sit to supine: moderate assistance x2   Transfers Scooting laterally deferred due to impaired trunk control, leg weakness, fatigue, inconsistently following PT instructions     Patient sat edge of bed approximately 8 minutes with variable level of assistance, initially maximum assistance with pushing L upper extremity towards the R side; progressed to 2 minutes of close contact guard assist in sitting  -Initially resistant to hand over hand placement of L hand in lap, willing with encouragement and decreased pushing  -Practiced reaching outside RAY with L hand anteriorly and towards L to work on trunk control, engaging core, midline orientation, minimum assistance           AM-PAC 6 CLICK MOBILITY  Turning over in bed (including adjusting bedclothes, sheets and blankets)?: 2  Sitting down on and standing up from a chair with arms (e.g., wheelchair, bedside commode, etc.): 1  Moving from lying on back to sitting on the side of the bed?: 2  Moving to and from a bed to a chair (including a wheelchair)?: 1  Need to walk in hospital room?: 1  Climbing 3-5 steps with a railing?: 1  Basic Mobility Total Score: 8       Therapeutic Activities and Exercises:   Patient and family educated on role of therapy, goals of session, benefits of out of bed mobility. " Patient agreeable to mobilize with therapy.  Discussed PT plan of care during hospitalization. Whiteboard updated as appropriate. Patient educated on how their diagnosis impacts their mobility within PT scope of practice. Performed quad sets on R leg with NDT facilitation, cues to attend to leg, performed bilaterally to facilitate overflow, good engagement, inconsistently following cues- 8 reps. Heel slides on R with minimum assistance, NDT cues for facilitation. Unable to detect attempts of ankle DF/PF on R. Family encouraged to perform supine therex with patient. Educated family on keeping R upper extremity elevated, encouraging them to have patient attend to R side.     Patient left left sidelying with all lines intact, call button in reach, bed alarm on and family present..    GOALS:   Multidisciplinary Problems     Physical Therapy Goals        Problem: Physical Therapy Goal    Goal Priority Disciplines Outcome Goal Variances Interventions   Physical Therapy Goal   (Resolved)     PT, PT/OT Outcome(s) achieved     Description:  Pt met goals at [facility] to demonstrate safe level of mobility for d/c home.  Pt amb community distance with (S) only.           Problem: Physical Therapy Goal    Goal Priority Disciplines Outcome Goal Variances Interventions   Physical Therapy Goal     PT, PT/OT Ongoing (interventions implemented as appropriate)     Description:  Goals to be met by: 2019    Patient will increase functional independence with mobility by performin. Supine to sit with moderate assistance  2. Sit to supine with moderate Assistance  3. Sit to stand transfer with Maximum Assistance  4. Gait  x 2 feet with Maximum Assistance using least restrictive device  5. Sitting at edge of bed x15 minutes with Contact Guard Assistance  6. Lower extremity exercise program x20 reps per handout, with assistance as needed                      Time Tracking:     PT Received On: 19  PT Start Time: 937     PT Stop  Time: 0958  PT Total Time (min): 21 min     Billable Minutes: Therapeutic Activity 15 (co-treat with OT)    Treatment Type: Treatment  PT/PTA: PT     PTA Visit Number: 0     Salome Brennan, PT  04/04/2019

## 2019-04-04 NOTE — PROGRESS NOTES
Dialysis tx completed. 3.5 hours. 0 fluid removal. Needles pulled from AVG. Hemostasis achieved. Gauze and tape to sites. Tolerated tx well. Report given to Linda ALVAREZ.

## 2019-04-04 NOTE — PLAN OF CARE
Problem: Physical Therapy Goal  Goal: Physical Therapy Goal  Goals to be met by: 2019    Patient will increase functional independence with mobility by performin. Supine to sit with moderate assistance  2. Sit to supine with moderate Assistance  3. Sit to stand transfer with Maximum Assistance  4. Gait  x 2 feet with Maximum Assistance using least restrictive device  5. Sitting at edge of bed x15 minutes with Contact Guard Assistance  6. Lower extremity exercise program x20 reps per handout, with assistance as needed     Outcome: Ongoing (interventions implemented as appropriate)  Continue with plan of care.   Salome Brennan, PT  2019

## 2019-04-04 NOTE — PLAN OF CARE
Patient alert and oriented to self. POC reviewed with patient and daughter; medications reviewed, questions encouraged and answered, daughter verbalized understanding. Systolic BP remained below 160. Please see flowsheets for V/S information. NAEO. O2 & suction at bedside, bed locked in lowest position, siderails up x2, call light in reach. WCTM.

## 2019-04-04 NOTE — PLAN OF CARE
Problem: Occupational Therapy Goal  Goal: Occupational Therapy Goal  Goals to be met by:  4/9/19    Patient will increase functional independence with ADLs by performing:    UE Dressing with Moderate Assistance using nikita-dressing technique.  Grooming while EOB with Moderate Assistance.  Rolling to Right with minimal assistance  Rolling to Left with Maximum Assistance.   Supine to sit with Maximum Assistance.  Toilet transfer to bedside commode with Maximum Assistance.  Pt/family demo and verbalized understanding of  BUE HEP and proper positioning in bed to prevent edema, contractures, and pressure ulcers.  Pt follow 100% of simple one-step commands with minimal cues provided.       Goals remain appropriate.

## 2019-04-05 ENCOUNTER — TELEPHONE (OUTPATIENT)
Dept: ENDOSCOPY | Facility: HOSPITAL | Age: 79
End: 2019-04-05

## 2019-04-05 DIAGNOSIS — K31.9 GASTRIC LESION: Primary | ICD-10-CM

## 2019-04-05 LAB
ALBUMIN SERPL BCP-MCNC: 2.9 G/DL (ref 3.5–5.2)
ALP SERPL-CCNC: 97 U/L (ref 55–135)
ALT SERPL W/O P-5'-P-CCNC: 24 U/L (ref 10–44)
ANION GAP SERPL CALC-SCNC: 9 MMOL/L (ref 8–16)
AST SERPL-CCNC: 20 U/L (ref 10–40)
BASOPHILS # BLD AUTO: 0.03 K/UL (ref 0–0.2)
BASOPHILS NFR BLD: 0.5 % (ref 0–1.9)
BILIRUB SERPL-MCNC: 1 MG/DL (ref 0.1–1)
BUN SERPL-MCNC: 38 MG/DL (ref 8–23)
CALCIUM SERPL-MCNC: 8.1 MG/DL (ref 8.7–10.5)
CHLORIDE SERPL-SCNC: 107 MMOL/L (ref 95–110)
CO2 SERPL-SCNC: 21 MMOL/L (ref 23–29)
CREAT SERPL-MCNC: 5.1 MG/DL (ref 0.5–1.4)
DIFFERENTIAL METHOD: ABNORMAL
EOSINOPHIL # BLD AUTO: 0.1 K/UL (ref 0–0.5)
EOSINOPHIL NFR BLD: 2 % (ref 0–8)
ERYTHROCYTE [DISTWIDTH] IN BLOOD BY AUTOMATED COUNT: 15.6 % (ref 11.5–14.5)
EST. GFR  (AFRICAN AMERICAN): 11.5 ML/MIN/1.73 M^2
EST. GFR  (NON AFRICAN AMERICAN): 9.9 ML/MIN/1.73 M^2
GLUCOSE SERPL-MCNC: 100 MG/DL (ref 70–110)
HCT VFR BLD AUTO: 24.7 % (ref 40–54)
HGB BLD-MCNC: 7.8 G/DL (ref 14–18)
IMM GRANULOCYTES # BLD AUTO: 0.07 K/UL (ref 0–0.04)
IMM GRANULOCYTES NFR BLD AUTO: 1.1 % (ref 0–0.5)
INR PPP: 2.3 (ref 0.8–1.2)
LYMPHOCYTES # BLD AUTO: 1.4 K/UL (ref 1–4.8)
LYMPHOCYTES NFR BLD: 21.9 % (ref 18–48)
MCH RBC QN AUTO: 31 PG (ref 27–31)
MCHC RBC AUTO-ENTMCNC: 31.6 G/DL (ref 32–36)
MCV RBC AUTO: 98 FL (ref 82–98)
MONOCYTES # BLD AUTO: 0.5 K/UL (ref 0.3–1)
MONOCYTES NFR BLD: 8.5 % (ref 4–15)
NEUTROPHILS # BLD AUTO: 4.2 K/UL (ref 1.8–7.7)
NEUTROPHILS NFR BLD: 66 % (ref 38–73)
NRBC BLD-RTO: 0 /100 WBC
PHOSPHATE SERPL-MCNC: 3.3 MG/DL (ref 2.7–4.5)
PLATELET # BLD AUTO: 139 K/UL (ref 150–350)
PMV BLD AUTO: 9.7 FL (ref 9.2–12.9)
POCT GLUCOSE: 110 MG/DL (ref 70–110)
POCT GLUCOSE: 147 MG/DL (ref 70–110)
POTASSIUM SERPL-SCNC: 4.6 MMOL/L (ref 3.5–5.1)
PROT SERPL-MCNC: 5.6 G/DL (ref 6–8.4)
PROTHROMBIN TIME: 22.5 SEC (ref 9–12.5)
RBC # BLD AUTO: 2.52 M/UL (ref 4.6–6.2)
SODIUM SERPL-SCNC: 137 MMOL/L (ref 136–145)
WBC # BLD AUTO: 6.36 K/UL (ref 3.9–12.7)

## 2019-04-05 PROCEDURE — 99232 SBSQ HOSP IP/OBS MODERATE 35: CPT | Mod: ,,, | Performed by: HOSPITALIST

## 2019-04-05 PROCEDURE — 25000003 PHARM REV CODE 250: Performed by: INTERNAL MEDICINE

## 2019-04-05 PROCEDURE — 90935 PR HEMODIALYSIS, ONE EVALUATION: ICD-10-PCS | Mod: ,,, | Performed by: NURSE PRACTITIONER

## 2019-04-05 PROCEDURE — 25000003 PHARM REV CODE 250: Performed by: PHYSICIAN ASSISTANT

## 2019-04-05 PROCEDURE — 80100016 HC MAINTENANCE HEMODIALYSIS

## 2019-04-05 PROCEDURE — 85610 PROTHROMBIN TIME: CPT

## 2019-04-05 PROCEDURE — 25000003 PHARM REV CODE 250: Performed by: NURSE PRACTITIONER

## 2019-04-05 PROCEDURE — 80053 COMPREHEN METABOLIC PANEL: CPT

## 2019-04-05 PROCEDURE — 36415 COLL VENOUS BLD VENIPUNCTURE: CPT

## 2019-04-05 PROCEDURE — 99233 SBSQ HOSP IP/OBS HIGH 50: CPT | Mod: GC,,, | Performed by: PSYCHIATRY & NEUROLOGY

## 2019-04-05 PROCEDURE — 85025 COMPLETE CBC W/AUTO DIFF WBC: CPT

## 2019-04-05 PROCEDURE — 90935 HEMODIALYSIS ONE EVALUATION: CPT | Mod: ,,, | Performed by: NURSE PRACTITIONER

## 2019-04-05 PROCEDURE — 25000003 PHARM REV CODE 250: Performed by: STUDENT IN AN ORGANIZED HEALTH CARE EDUCATION/TRAINING PROGRAM

## 2019-04-05 PROCEDURE — 90935 HEMODIALYSIS ONE EVALUATION: CPT

## 2019-04-05 PROCEDURE — 99232 PR SUBSEQUENT HOSPITAL CARE,LEVL II: ICD-10-PCS | Mod: ,,, | Performed by: HOSPITALIST

## 2019-04-05 PROCEDURE — 99900035 HC TECH TIME PER 15 MIN (STAT)

## 2019-04-05 PROCEDURE — 84100 ASSAY OF PHOSPHORUS: CPT

## 2019-04-05 PROCEDURE — 94660 CPAP INITIATION&MGMT: CPT

## 2019-04-05 PROCEDURE — 25000003 PHARM REV CODE 250: Performed by: HOSPITALIST

## 2019-04-05 PROCEDURE — 94761 N-INVAS EAR/PLS OXIMETRY MLT: CPT

## 2019-04-05 PROCEDURE — 99233 PR SUBSEQUENT HOSPITAL CARE,LEVL III: ICD-10-PCS | Mod: GC,,, | Performed by: PSYCHIATRY & NEUROLOGY

## 2019-04-05 PROCEDURE — 20600001 HC STEP DOWN PRIVATE ROOM

## 2019-04-05 PROCEDURE — 63600175 PHARM REV CODE 636 W HCPCS: Performed by: NURSE PRACTITIONER

## 2019-04-05 RX ORDER — QUETIAPINE FUMARATE 25 MG/1
25 TABLET, FILM COATED ORAL NIGHTLY PRN
Status: DISCONTINUED | OUTPATIENT
Start: 2019-04-05 | End: 2019-04-09 | Stop reason: HOSPADM

## 2019-04-05 RX ADMIN — RAMELTEON 8 MG: 8 TABLET, FILM COATED ORAL at 08:04

## 2019-04-05 RX ADMIN — CARVEDILOL 25 MG: 25 TABLET, FILM COATED ORAL at 06:04

## 2019-04-05 RX ADMIN — DOXYCYCLINE HYCLATE 100 MG: 100 TABLET, COATED ORAL at 08:04

## 2019-04-05 RX ADMIN — GABAPENTIN 300 MG: 300 CAPSULE ORAL at 08:04

## 2019-04-05 RX ADMIN — INSULIN DETEMIR 8 UNITS: 100 INJECTION, SOLUTION SUBCUTANEOUS at 12:04

## 2019-04-05 RX ADMIN — LOSARTAN POTASSIUM 100 MG: 50 TABLET, FILM COATED ORAL at 08:04

## 2019-04-05 RX ADMIN — ALLOPURINOL 100 MG: 100 TABLET ORAL at 12:04

## 2019-04-05 RX ADMIN — QUETIAPINE FUMARATE 25 MG: 25 TABLET ORAL at 08:04

## 2019-04-05 RX ADMIN — SODIUM CHLORIDE: 0.9 INJECTION, SOLUTION INTRAVENOUS at 08:04

## 2019-04-05 RX ADMIN — STANDARDIZED SENNA CONCENTRATE AND DOCUSATE SODIUM 1 TABLET: 8.6; 5 TABLET, FILM COATED ORAL at 12:04

## 2019-04-05 RX ADMIN — TORSEMIDE 20 MG: 20 TABLET ORAL at 12:04

## 2019-04-05 RX ADMIN — TORSEMIDE 20 MG: 20 TABLET ORAL at 08:04

## 2019-04-05 RX ADMIN — DOXYCYCLINE HYCLATE 100 MG: 100 TABLET, COATED ORAL at 12:04

## 2019-04-05 RX ADMIN — PANTOPRAZOLE SODIUM 40 MG: 40 TABLET, DELAYED RELEASE ORAL at 12:04

## 2019-04-05 RX ADMIN — WARFARIN SODIUM 7.5 MG: 2.5 TABLET ORAL at 06:04

## 2019-04-05 RX ADMIN — ATORVASTATIN CALCIUM 80 MG: 20 TABLET, FILM COATED ORAL at 08:04

## 2019-04-05 RX ADMIN — STANDARDIZED SENNA CONCENTRATE AND DOCUSATE SODIUM 1 TABLET: 8.6; 5 TABLET, FILM COATED ORAL at 08:04

## 2019-04-05 RX ADMIN — FINASTERIDE 5 MG: 5 TABLET, FILM COATED ORAL at 12:04

## 2019-04-05 RX ADMIN — CARVEDILOL 25 MG: 25 TABLET, FILM COATED ORAL at 12:04

## 2019-04-05 RX ADMIN — IRON SUCROSE 100 MG: 20 INJECTION, SOLUTION INTRAVENOUS at 12:04

## 2019-04-05 RX ADMIN — TAMSULOSIN HYDROCHLORIDE 0.4 MG: 0.4 CAPSULE ORAL at 12:04

## 2019-04-05 NOTE — PROGRESS NOTES
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  He has significant intracranial/extrancranial atherosclerotic disease seen on CTA head/neck. Etiology cardioembolic versus atheroembolic.      - Anticoagulants: Coumadin daily adjusting for INR goal of 2.0-3.0   - Statins: Atorvastatin- 80 mg daily  -Diagnostic studies pending: INR and cultures pending   - Aggressive risk factor modification: HTN, DM, HLD, A-Fib, CAD, intracranial atherosclerosis   - Rehab efforts: PT/OT to evaluate and treat ->recommending Rehab, SLP -> recommending Regular diet/nectar thick  - VTE prophylaxis: anticoagulated  - BP parameters: target sBP<160    Delirium precautions in place  telesitter ordered  remeltion and seroquel as needed     Umbilical hernia  + abdominal pain during hospitalization  CT scan ordered showed umbilical hernia  General surgery consulted and recommended outpatient follow up     Cytotoxic cerebral edema  Large area of cytotoxic cerebral edema identified when reviewing brain imaging in the territory of the left posterior cerebral artery. There is mass effect associated with it. We will continue to monitor the patients clinical exam for any worsening of symptoms which may indicate expansion of the stroke or the area of the edema resulting in the clinical change. The pattern is suggestive of atheroembolic etiology.        Hemoglobin drop  Within the last 3 days from 8.2 >7.9 >7.1    - Discontinued heparin gtt, now coumadin   - will transfuse with pRBC x1 (consent obtained from daughter Tonya Hunt via phone)  - continue to monitor     Groin hematoma  Monitoring daily,continue to worsen  H/h decreasing Discontinued Heparin gtt  Stat right femoral arterial ultrasound  ordered and negative for vasculature abnormalities   Soft - continue to monitor     Dysarthria due to acute cerebrovascular accident (CVA)  - recommend patient be re-evaluated by SLP  - recommend regular diet with nectar thick   When awake     Cellulitis of right arm  R forearm edema   Arterial line removed, evaluated by ortho for compartment syndrome which was negative as well as US  Tried vanc and clinda   De-escalated at this time and on doxy - hospital medicine managing   signifcant improvement     Patent foramen ovale  Risk factor for stroke  Seen on echocardiogram     Paroxysmal atrial fibrillation  Risk factor for stroke   CHADsVASc 7    - Carvedilol for rate control   - Heparin gtt discontinued due to drop in Hgb  Therapeutic on 7.5 mg daily     ESRD (end stage renal disease) on dialysis  Patient is being followed by nephrology, appreciate their assistance  Continue scheduled HD, MWF    Dialysis today     NSTEMI (non-ST elevated myocardial infarction)  Seen by cards on 3/25/19   Continue medical management   Does not complain of chest pain at this time     Type 2 diabetes mellitus, without long-term current use of insulin  Poorly controlled, A1C 8.3  Current glucose running ~100-150  Detemir 8 units daily  MD SSI  POCt glucose q6 hr    Long term (current) use of anticoagulants  - coumadin daily  INR 2.3 today     Benign prostatic hyperplasia without lower urinary tract symptoms  H/o and CT showed enlarged prostate   - continue flomax and finasteride  -has been making urine and several episodes of incontinence per sister in law but has not urinated since yesterday  -u/a negative, bladder scan   - hematuria today with clots - urology consulted , condom cath ordered, resolved   -q 6 hour bladder scans     Anemia of chronic disease  Due to ESRD   receives EPO every 2 weeks   H/h continues to trend down.  Blood transfusion 03/31 1unit  Stable currently   Hematuria resolved       Obstructive sleep apnea  Sleep  study in 2014  CONCLUSION:  Nocturnal polysomnography demonstrates:  1.  Obstructive sleep apnea to be present with 44.2 events an hour with   desaturation to 81%.  2.  Sinus rhythm with occasional PVC.    Apparently lost to follow up- per daughter, reported that no one ever called to tell him report   Snoring in the hospital overnight   Plan to try cpap in hospital -can take off if unable to tolerate   Stroke risk factor     Gout, arthritis  - continue allopurinol    CAD (coronary artery disease), 2V CABG 2007  Risk factor for stroke  Continue statin.   On coumadin daily - 7.5 mg   INR 2.3 today     Hyperlipidemia  LDL 46  Continue atorvastatin 80 mg due to extensive intracranial atherosclerotic disease      Essential hypertension  Risk factor for stroke   SBP<160  continue Torsemide 20 mg q12, losartan 100 mg qd, and Carvedilol 25 mg q12        Carotid artery stenosis  Noted CTA and carotid US  Patient evaluated by vascular surgery, recommended maximal medical management    Hematuria  Complicated by low platelets, need for anticoagulation  Urology consulted   q6 hour bladder scans for retention   Condom cath ordered - planning to monitor at this point.   Resolved          Admitted to hospital medicine for unstable angina eval, vascular neurology consulted for right sided weakness, facial droop and dysarthria, MRI showed L PCA infarction, not candidate for TPA ( symptoms duration >4 hours)patient symptoms improved with laying flat, admitted to neuro critical care. Patient with known PCA infarct on L with undulating symptom pattern with modification to level of recumbency. Patient restarted on heparin gtt. Underwent US of LE and of carotid with evidence of <50% L ICA stenosis and 60-70% R ICA stenosis, heavily calcified on CTA, additionally L vertebral also heavily calcified. Patient tolerated HD. Neuro deficit appear to be more severe on 3/27 with decreasing right arm strength now 2/5 and with worsening  "dysarthria/aphasia. Patient with gross swelling of R forearm - tense, pulses intact.   03/29/2019 patient with worsening of neurological deficit, unable to move right upper extremity, patient with pain on passive movement of right fingers exhibited by facial grimace as verbal response to pain/sensation/light touch no longer reliable, aphasia/dysarthria worsening, RLE with 2/5 strength today, patient evaluated by orthopedic surgery regarding R forearm yesterday and today and feel that this is not compartment syndrome and will continue to monitor, distal pulses remain palpable  3/30: NAEON. On heparin gtt. Plans to step down  3/31: stepped down to VN without major events. transfused with pRBC x1 due to drop in Hbg. Discontinued heparin gtt. INR increased to 1.2, increased warfarin to 7.5  04/01/19 Visited patient in dialysis today.  On coumadin and atorvastatin for secondary stroke prevention H/H stabilized at 7.6 after 1 unit pRBC transfusion on 03/31.  Epo given during dialysis.  Patient has worsening right groin hematoma.  Stat right femoral artery u/s ordered.  On clindamycin for suspected right arm cellulitis.  Sister-in-law concern for urination.  Patient was incontinent and making urine but today has not had any urination.  U/A and bladder scan ordered   04/02/19 hospital medicine consulted for multiple medical issues. Right arm edema worsening.  Switching antibiotics from oral to IV     4/3/19 - drowsy today, less interactive. Plan for dialysis today. Noted to have gross hematuria with clots , bladder scan 315.   4/4/19 - will place condom catheter for hematuria monitoring, H&H stable. Hospital medicine helping with cellulitis. cpap ordered for night  4/5/19 - dialysis today, agitated overnight and throughout the day. Appears angry telling his family today "leave me alone, go home". Will plan for telesitter, pharmacologic therapy ordered prn tonight, with plans to involve psych if not able to control as at " "this time its causing him to be less interactive with therapy     STROKE DOCUMENTATION        NIH Scale:  1a. Level of Consciousness: 1-->Not alert, but arousable by minor stimulation to obey, answer, or respond  1b. LOC Questions: 2-->Answers neither question correctly  1c. LOC Commands: 1-->Performs one task correctly  2. Best Gaze: 0-->Normal  3. Visual: 2-->Complete hemianopia  4. Facial Palsy: 0-->Normal symmetrical movements  5a. Motor Arm, Left: 1-->Drift, limb holds 90 (or 45) degrees, but drifts down before full 10 seconds, does not hit bed or other support  5b. Motor Arm, Right: 3-->No effort against gravity, limb falls  6a. Motor Leg, Left: 2-->Some effort against gravity, leg falls to bed by 5 secs, but has some effort against gravity  6b. Motor Leg, Right: 3-->No effort against gravity, leg falls to bed immediately  7. Limb Ataxia: 0-->Absent  8. Sensory: 1-->Mild-to-moderate sensory loss, patient feels pinprick is less sharp or is dull on the affected side, or there is a loss of superficial pain with pinprick, but patient is aware of being touched  9. Best Language: 2-->Severe aphasia, all communication is through fragmentary expression, great need for inference, questioning, and guessing by the listener. Range of information that can be exchanged is limited, listener carries burden of. . . (see row details)  10. Dysarthria: 0-->Normal  11. Extinction and Inattention (formerly Neglect): 0-->No abnormality  Total (NIH Stroke Scale): 18       Modified [place]    [place] Coma Scale:    ABCD2 Score:    [id number] Score:8  HAS -BLED Score:   ICH Score:   Hunt & Sharp Classification:      Hemorrhagic change of an Ischemic Stroke: Does this patient have an ischemic stroke with hemorrhagic changes? No     Neurologic Chief Complaint: RSW + difficulty with speech    Subjective:     Interval History:   dialysis today, agitated overnight and throughout the day. Appears angry telling his family today "leave me " "alone, go home". Will plan for telesitter, pharmacologic therapy ordered prn tonight, with plans to involve psych if not able to control as at this time its causing him to be less interactive with therapy     HPI, Past Medical, Family, and Social History remains the same as documented in the initial encounter.     Review of Systems   Constitutional: Positive for fatigue. Negative for diaphoresis and fever.   Eyes: Positive for visual disturbance.   Genitourinary: Positive for hematuria.   Skin: Positive for color change.   Neurological: Positive for facial asymmetry, speech difficulty and weakness. Negative for seizures and light-headedness.   Psychiatric/Behavioral: Positive for sleep disturbance. Negative for agitation.     Scheduled Meds:   allopurinol  100 mg Oral Daily    atorvastatin  80 mg Oral Daily    carvedilol  25 mg Oral BID WM    doxycycline  100 mg Oral Q12H    finasteride  5 mg Oral Daily    gabapentin  300 mg Oral QHS    insulin detemir U-100  8 Units Subcutaneous Daily    losartan  100 mg Oral QHS    pantoprazole  40 mg Oral Daily    polyethylene glycol  17 g Oral Daily    senna-docusate 8.6-50 mg  1 tablet Oral BID    tamsulosin  1 capsule Oral Daily    torsemide  20 mg Oral BID    warfarin  7.5 mg Oral Daily     Continuous Infusions:    PRN Meds:sodium chloride, sodium chloride 0.9%, acetaminophen, albuterol-ipratropium, dextrose 50%, dextrose 50%, glucagon (human recombinant), glucose, glucose, insulin aspart U-100, nitroGLYCERIN, ondansetron, QUEtiapine, ramelteon    Objective:     Vital Signs (Most Recent):  Temp: 98.6 °F (37 °C) (04/05/19 1254)  Pulse: 64 (04/05/19 1625)  Resp: 18 (04/05/19 1625)  BP: (!) 131/58 (04/05/19 1625)  SpO2: (!) 93 % (04/05/19 1625)  BP Location: Right arm    Vital Signs Range (Last 24H):  Temp:  [97.7 °F (36.5 °C)-99.1 °F (37.3 °C)]   Pulse:  [51-83]   Resp:  [16-18]   BP: (124-177)/(48-90)   SpO2:  [93 %-99 %]   BP Location: Right arm    Physical Exam "   Constitutional: He appears well-developed and well-nourished. No distress.   HENT:   Head: Normocephalic and atraumatic.   Cardiovascular: Normal rate.   Pulmonary/Chest: Effort normal. No accessory muscle usage. No respiratory distress.   Genitourinary:   Genitourinary Comments: Hematoma in the R groin   Skin: Skin is warm. He is not diaphoretic.   Nursing note and vitals reviewed.    Neurological Exam:   LOC: drowsy and agitated upon waking   Language and articulation:expressive (nods appropriately) and receptive  Visual Fields: no blink to threat right    Decreased sensation on right   Motor: right upper extremity 1/5 , RLE 2/5  Left side 4/5   Attempts to read cards but has difficulty with speech and aphasia     Laboratory:  Recent Results (from the past 24 hour(s))   POCT glucose    Collection Time: 04/04/19  9:35 PM   Result Value Ref Range    POCT Glucose 168 (H) 70 - 110 mg/dL   Comprehensive metabolic panel    Collection Time: 04/05/19  4:55 AM   Result Value Ref Range    Sodium 137 136 - 145 mmol/L    Potassium 4.6 3.5 - 5.1 mmol/L    Chloride 107 95 - 110 mmol/L    CO2 21 (L) 23 - 29 mmol/L    Glucose 100 70 - 110 mg/dL    BUN, Bld 38 (H) 8 - 23 mg/dL    Creatinine 5.1 (H) 0.5 - 1.4 mg/dL    Calcium 8.1 (L) 8.7 - 10.5 mg/dL    Total Protein 5.6 (L) 6.0 - 8.4 g/dL    Albumin 2.9 (L) 3.5 - 5.2 g/dL    Total Bilirubin 1.0 0.1 - 1.0 mg/dL    Alkaline Phosphatase 97 55 - 135 U/L    AST 20 10 - 40 U/L    ALT 24 10 - 44 U/L    Anion Gap 9 8 - 16 mmol/L    eGFR if African American 11.5 (A) >60 mL/min/1.73 m^2    eGFR if non African American 9.9 (A) >60 mL/min/1.73 m^2   Protime-INR    Collection Time: 04/05/19  4:55 AM   Result Value Ref Range    Prothrombin Time 22.5 (H) 9.0 - 12.5 sec    INR 2.3 (H) 0.8 - 1.2   CBC auto differential    Collection Time: 04/05/19  4:55 AM   Result Value Ref Range    WBC 6.36 3.90 - 12.70 K/uL    RBC 2.52 (L) 4.60 - 6.20 M/uL    Hemoglobin 7.8 (L) 14.0 - 18.0 g/dL    Hematocrit  24.7 (L) 40.0 - 54.0 %    MCV 98 82 - 98 fL    MCH 31.0 27.0 - 31.0 pg    MCHC 31.6 (L) 32.0 - 36.0 g/dL    RDW 15.6 (H) 11.5 - 14.5 %    Platelets 139 (L) 150 - 350 K/uL    MPV 9.7 9.2 - 12.9 fL    Immature Granulocytes 1.1 (H) 0.0 - 0.5 %    Gran # (ANC) 4.2 1.8 - 7.7 K/uL    Immature Grans (Abs) 0.07 (H) 0.00 - 0.04 K/uL    Lymph # 1.4 1.0 - 4.8 K/uL    Mono # 0.5 0.3 - 1.0 K/uL    Eos # 0.1 0.0 - 0.5 K/uL    Baso # 0.03 0.00 - 0.20 K/uL    nRBC 0 0 /100 WBC    Gran% 66.0 38.0 - 73.0 %    Lymph% 21.9 18.0 - 48.0 %    Mono% 8.5 4.0 - 15.0 %    Eosinophil% 2.0 0.0 - 8.0 %    Basophil% 0.5 0.0 - 1.9 %    Differential Method Automated    Phosphorus    Collection Time: 04/05/19  4:55 AM   Result Value Ref Range    Phosphorus 3.3 2.7 - 4.5 mg/dL   POCT glucose    Collection Time: 04/05/19 11:45 AM   Result Value Ref Range    POCT Glucose 110 70 - 110 mg/dL   POCT glucose    Collection Time: 04/05/19  4:20 PM   Result Value Ref Range    POCT Glucose 147 (H) 70 - 110 mg/dL       Diagnostic Results     Brain Imaging   CTH non-contrast (3/28/2019):   Evolving subacute to chronic appearing infarcts in L thalamus and L PCA territory  small remote lacune in R putamen.  Cytotoxic cerebral edema    MRI Brain (3/26/19):  New acute left PCA territory distribution infarctions involving L thalamus and L paramedian occipital lobe, as well as L splenium of the corpus callosum   Generalized cerebral volume loss   significant chronic microvascular ischemic disease.      MRI Brain (3/24/19):  Vascular findings similar to CTH and CTA    Vessel Imaging   CTA-MP (3/24/19):  Extensive intracranial atherosclerotic disease:  Left PCA with a high-grade stenosis. High-grade (>70%) stenosis right proximal ICA and moderate (50-70%) stenosis left proximal ICA. Moderate to high-grade stenosis of the intra cavernous/ supraclinoid ICAs bilaterally. High-grade stenosis distal left vertebral artery.    Cardiac Imaging   TTE (3/23/19):  EF 55%,  LVH  severe bilateral atrial enlargement   PA pressure  41   PFO with left to right shunting        NANCY Henderson  Comprehensive Stroke Center  Department of Vascular Neurology   [facility]

## 2019-04-05 NOTE — ASSESSMENT & PLAN NOTE
R forearm edema   Arterial line removed, evaluated by ortho for compartment syndrome which was negative as well as US  Tried vanc and clinda   De-escalated at this time and on [facility] - hospital medicine managing   signifcant improvement

## 2019-04-05 NOTE — SUBJECTIVE & OBJECTIVE
Past Medical History:   Diagnosis Date    NICK (acute kidney injury) 7/29/2016    Allergy     Anemia, mild 12/15/2014    Arthritis     Gout    Benign essential HTN 3/27/2012    BMI 29.0-29.9,adult 5/10/2018    BPH (benign prostatic hyperplasia)     BPH (benign prostatic hyperplasia)     CAD (coronary artery disease) 2006    Chronic kidney disease     due to ibuprofen    Colon polyp     CRF (chronic renal failure), stage 5     Diverticulosis     Gastritis     GERD (gastroesophageal reflux disease)     Gout     History of colon polyps 5/3/2018    HTN (hypertension) 3/27/2012    Hyperlipidemia     Hyperlipidemia     LLL pneumonia 6/14/2018    LVH (left ventricular hypertrophy)     Mesenteric ischemia     Murmur, cardiac 3/27/2012    GRICELDA (obstructive sleep apnea)     DOES NOT USE A MACHINE    Sinus problem     Syncope and collapse        Past Surgical History:   Procedure Laterality Date    APPENDECTOMY      BLOCK-NERVE Left 5/31/2016    [facility] at [facility]    CARDIAC CATHETERIZATION      COLONOSCOPY  2011    COLONOSCOPY N/A 5/3/2018    [facility]    COLONOSCOPY N/A 9/10/2015    [facility]    CORONARY ARTERY BYPASS GRAFT  4/2007    x 1    CYSTOSCOPY N/A 8/30/2017    [facility] at [facility]    CYSTOSCOPY N/A 11/10/2015    [facility]    ESOPHAGOGASTRODUODENOSCOPY (EGD) N/A 1/4/2016    [facility] (2ND F[facility])    ESOPHAGOGASTRODUODENOSCOPY (EGD) N/A 10/15/2014    [facility] at [facility]    INJECTION-STEROID-EPIDURAL-TRANSFORAMINAL Left 2/25/2016    [facility]    JOINT REPLACEMENT      left knee total replacement  X 3    mid leftt finger      from a cactuss    MOLE REMOVAL  2016    RADIOFREQUENCY THERMOCOAGULATION (RFTC)-NERVE-MEDIAN BRANCH-LUMBAR Left  4/11/2016    [facility] OR    rotative cuff      no rotative cuffs on bilat shoulders has pins     SHOULDER SURGERY      shoulder surgery bilat  RIGHT X 4; LEFT X 3    TRANSRECTAL ULTRASOUND GUIDED PROSTATE BIOPSY Bilateral 11/10/2015    [facility] OR    ULTRASOUND-ENDOSCOPIC-UPPER N/A 5/31/2017    [facility] ENDO (2ND FLR)    ULTRASOUND-ENDOSCOPIC-UPPER N/A 1/4/2016    Performed [facility] at [facility] ENDO (2ND FLR)    ULTRASOUND-ENDOSCOPIC-UPPER N/A 1/16/2015    Performed by Jason Saleem MD at [facility] ENDO (2ND FLR)       Review of patient's allergies indicates:   Allergen Reactions    Ace inhibitors Other (See Comments)     Cough    Arb-angiotensin receptor antagonist Itching    Eplerenone Other (See Comments)     Marked bradycardia, 40, tiredness and weakness      Sulfa (sulfonamide antibiotics) Itching     Patient says this was 10 years ago and doesn't remember what happened       No current facility-administered medications on file prior to encounter.      Current Outpatient Medications on File Prior to Encounter   Medication Sig    allopurinol (ZYLOPRIM) 100 MG tablet Take 100 mg by mouth once daily.    amLODIPine (NORVASC) 10 MG tablet Take 10 mg by mouth once daily.    aspirin (ECOTRIN) 81 MG EC tablet Take 81 mg by mouth once daily.      atorvastatin (LIPITOR) 80 MG tablet TAKE 1 TABLET (80 MG TOTAL) BY MOUTH ONCE DAILY.    budesonide-formoterol 160-4.5 mcg (SYMBICORT) 160-4.5 mcg/actuation HFAA Inhale 2 puffs into the lungs every 12 (twelve) hours. Controller    carvedilol (COREG) 12.5 MG tablet Take 12.5 mg by mouth 2 (two) times daily with meals.    celecoxib (CELEBREX) 200 MG capsule Take 1 capsule (200 mg total) by mouth once daily.    finasteride (PROSCAR) 5 mg tablet TAKE 1 TABLET ONCE DAILY.    fluticasone (FLONASE) 50 mcg/actuation nasal spray 2 sprays (100 mcg total) by Each Nare route  once daily.    gabapentin (NEURONTIN) 300 MG capsule Take 1 capsule (300 mg total) by mouth every evening.    isosorbide mononitrate (ISMO,MONOKET) 10 mg tablet TAKE 1 TABLET BY MOUTH EVERY DAY IN THE EVENING    meclizine (ANTIVERT) 25 mg tablet Take 1 tablet (25 mg total) by mouth 3 (three) times daily as needed for Dizziness.    mirtazapine (REMERON) 7.5 MG Tab TAKE 1 TABLET BY MOUTH EVERY EVENING.    montelukast (SINGULAIR) 10 mg tablet Take 10 mg by mouth every evening.    NITROSTAT 0.4 mg SL tablet PLACE 1 TABLET (0.4 MG TOTAL) UNDER THE TONGUE EVERY 5 (FIVE) MINUTES AS NEEDED FOR CHEST PAIN.    omeprazole (PRILOSEC) 20 MG capsule TAKE 1 CAPSULE BY MOUTH EVERY DAY    tamsulosin (FLOMAX) 0.4 mg Cap TAKE 1 CAPSULE BY MOUTH EVERY DAY    tiotropium bromide (SPIRIVA RESPIMAT) 1.25 mcg/actuation Mist Inhale 2.5 mcg into the lungs once daily. Controller    torsemide (DEMADEX) 20 MG Tab Take 1 tablet (20 mg total) by mouth 2 (two) times daily.    warfarin (COUMADIN) 7.5 MG tablet Take 1 tablet (7.5 mg total) by mouth Daily.    zolpidem (AMBIEN) 5 MG Tab TAKE 1 TABLET BY MOUTH EVERY EVENING AS NEEDED    albuterol (PROVENTIL/VENTOLIN HFA) 90 mcg/actuation inhaler 2 puffs every 4 hours as needed for cough, wheeze, or shortness of breath    albuterol-ipratropium (DUO-NEB) 2.5 mg-0.5 mg/3 mL nebulizer solution Take 3 mLs by nebulization every 6 (six) hours as needed for Wheezing or Shortness of Breath.     Family History     Problem Relation (Age of Onset)    Cancer Father    Heart disease Mother, Sister    Hypertension Brother    Pneumonia Sister    Stroke Sister    Sudden death Father        Tobacco Use    Smoking status: Never Smoker    Smokeless tobacco: Never Used   Substance and Sexual Activity    Alcohol use: No    Drug use: No    Sexual activity: Not on file     Review of Systems   Constitutional: Negative for fatigue and fever.   HENT: Negative for congestion and rhinorrhea.    Eyes: Negative for  pain and visual disturbance.   Respiratory: Negative for cough, shortness of breath and wheezing.    Cardiovascular: Negative for chest pain, palpitations and leg swelling.   Gastrointestinal: Negative for abdominal pain, nausea and vomiting.   Genitourinary: Negative for difficulty urinating, dysuria and hematuria.   Musculoskeletal: Negative for arthralgias and back pain.   Skin: Positive for color change. Negative for pallor.   Neurological: Positive for facial asymmetry, speech difficulty, weakness (R sided) and numbness. Negative for dizziness and headaches.   Hematological: Negative for adenopathy. Bruises/bleeds easily.   Psychiatric/Behavioral: Positive for confusion and decreased concentration. Negative for agitation. The patient is not nervous/anxious.      Objective:     Vital Signs (Most Recent):  Temp: 98.6 °F (37 °C) (04/05/19 1254)  Pulse: 79 (04/05/19 1254)  Resp: 16 (04/05/19 1254)  BP: (!) 150/76 (04/05/19 1254)  SpO2: 95 % (04/05/19 1254) Vital Signs (24h Range):  Temp:  [97.7 °F (36.5 °C)-99.1 °F (37.3 °C)] 98.6 °F (37 °C)  Pulse:  [51-83] 79  Resp:  [16-18] 16  SpO2:  [95 %-99 %] 95 %  BP: (124-177)/(48-90) 150/76     Weight: 106.6 kg (235 lb 0.2 oz)  Body mass index is 31.01 kg/m².    Physical Exam   Constitutional: He appears well-developed and well-nourished. No distress.   HENT:   Head: Normocephalic and atraumatic.   Mouth/Throat: No oropharyngeal exudate.   Eyes: Pupils are equal, round, and reactive to light. EOM are normal. Right eye exhibits no discharge. Left eye exhibits no discharge.   Neck: Normal range of motion. Neck supple.   Cardiovascular: Normal rate and intact distal pulses.   No murmur heard.  Pulmonary/Chest: Effort normal and breath sounds normal. No stridor. No respiratory distress. He has no wheezes. He exhibits no tenderness.   Abdominal: Soft. Bowel sounds are normal. There is no tenderness. There is no guarding.   Musculoskeletal: He exhibits no edema or tenderness.    Neurological: He is alert.   Follows commands. Mostly nods to yes or no questions. Aphasic, dysarthria   Skin: Skin is warm and dry. He is not diaphoretic. There is erythema.   R groin hematoma extending to scrotum/pelvis, RUE and R foot bruising. Mild erythema to R forearm near wrist with vesicle, no TTP, no drainage --stable from yesterday   Psychiatric: He has a normal mood and affect. His behavior is normal.   Vitals reviewed.      Significant Labs:   CBC:   Recent Labs   Lab 04/04/19 0328 04/05/19 0455   WBC 6.58 6.36   HGB 7.8* 7.8*   HCT 23.8* 24.7*   * 139*       Recent Labs   Lab 04/04/19 0328 04/05/19 0455    137   K 4.3 4.6    107   CO2 24 21*    100   BUN 25* 38*   CREATININE 3.8* 5.1*   CALCIUM 8.5* 8.1*   PROT 5.3* 5.6*   ALBUMIN 2.7* 2.9*   BILITOT 1.3* 1.0   ALKPHOS 90 97   AST 19 20   ALT 23 24   ANIONGAP 7* 9   EGFRNONAA 14.2* 9.9*       Significant Imaging: I have reviewed all pertinent imaging results/findings within the past 24 hours.

## 2019-04-05 NOTE — PROGRESS NOTES
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 Micheal Hunt, 78 y.o. male with history of HTN, HLD, GRICELDA, CAD (s/p CABG 2007), ESRD on HD MWF, CHFpEF, Afib on coumadin, DM2,and recent admission for PNA was transferred to [facility] from [place] for unstable angina and hrt cath.  Prior to heart cath, stroke code was called for right sided weakness, facial droop and dysarthria. MRI showed L PCA infarction, not candidate for TPA. Admitted to neuro critical care and started on heparin gtt. Underwent US of LE and of carotid with evidence of <50% L ICA stenosis and 60-70% R ICA stenosis, heavily calcified on CTA, additionally L vertebral also heavily calcified. Nephrology following for ESRD on HD. During hospitalization, pt noted to have gross swelling of R forearm - tense, pulses intact. Patient evaluated by orthopedic surgery regarding R forearm and feel that this is not compartment syndrome. Pt was started on clindamycin for suspected right arm cellulitis. On 3/31, pt was stepped down to vascular neurology. Pt transfused with pRBC x1 due to drop in Hbg. Discontinued heparin gtt. Pt also noted to have R groin hematoma, worsening. Stat right femoral artery u/s negative for any fluid collection, aneurysm, or stenosis. 4/2 Right arm edema worsening so switched antibiotics from oral clinda to IV vancomycin and cefepime. Hospital medicine consulted for multiple medical issues: R groin hematoma, R arm cellulitis management.        Hospital Course:  No notes on file    Past Medical History:   Diagnosis Date     NICK (acute kidney injury) 7/29/2016    Allergy     Anemia, mild 12/15/2014    Arthritis     Gout    Benign essential HTN 3/27/2012    BMI 29.0-29.9,adult 5/10/2018    BPH (benign prostatic hyperplasia)     BPH (benign prostatic hyperplasia)     CAD (coronary artery disease) 2006    Chronic kidney disease     due to ibuprofen    Colon polyp     CRF (chronic renal failure), stage 5     Diverticulosis     Gastritis     GERD (gastroesophageal reflux disease)     Gout     History of colon polyps 5/3/2018    HTN (hypertension) 3/27/2012    Hyperlipidemia     Hyperlipidemia     LLL pneumonia 6/14/2018    LVH (left ventricular hypertrophy)     Mesenteric ischemia     Murmur, cardiac 3/27/2012    GRICELDA (obstructive sleep apnea)     DOES NOT USE A MACHINE    Sinus problem     Syncope and collapse        Past Surgical History:   Procedure Laterality Date    APPENDECTOMY      BLOCK-NERVE Left 5/31/2016    [facility] OR    CARDIAC CATHETERIZATION      COLONOSCOPY  2011    COLONOSCOPY N/A 5/3/2018    [facility] END[facility]    COLONOSCOPY N/A 9/10/2015    [facility] at [facility]    CORONARY ARTERY BYPASS GRAFT  4/2007    x 1    CYSTOSCOPY N/A 8/30/2017    [facility]formed by [facility] at [facility] OR    CYSTOSCOPY N/A 11/10/2015    [facility] at [facility] OR    ESOPHAGOGASTRODUODENOSCOPY (EGD) N/A 1/4/2016    Performed [facility] Tra [facility] at [facility] ENDO (2ND FLR)    ESOPHAGOGASTRODUODENOSCOPY (EGD) N/A 10/15/2014    [facility]formed by [facility] at [facility] ENDO    INJECTION-STEROID-EPIDURAL-TRANSFORAMINAL Left 2/25/2016    [facility] at [facility] OR    JOINT REPLACEMENT      left knee total replacement  X 3    mid leftt finger      from a cactuss    MOLE REMOVAL  2016    RADIOFREQUENCY THERMOCOAGULATION (RFTC)-NERVE-MEDIAN BRANCH-LUMBAR Left 4/11/2016    [facility] at  [facility] OR    rotative cuff      no rotative cuffs on bilat shoulders has pins     SHOULDER SURGERY      shoulder surgery bilat  RIGHT X 4; LEFT X 3    TRANSRECTAL ULTRASOUND GUIDED PROSTATE BIOPSY Bilateral 11/10/2015    [facility]    ULTRASOUND-ENDOSCOPIC-UPPER N/A 5/31/2017    [facility]formed [facility] at [facility] ENDO (2ND FLR)    ULTRASOUND-ENDOSCOPIC-UPPER N/A 1/4/2016    Performed by Tra [facility] at [facility] ENDO (2ND FLR)    ULTRASOUND-ENDOSCOPIC-UPPER N/A 1/16/2015    Performed by Jason Saleem MD at [facility] ENDO (2ND FLR)       Review of patient's allergies indicates:   Allergen Reactions    Ace inhibitors Other (See Comments)     Cough    Arb-angiotensin receptor antagonist Itching    Eplerenone Other (See Comments)     Marked bradycardia, 40, tiredness and weakness      Sulfa (sulfonamide antibiotics) Itching     Patient says this was 10 years ago and doesn't remember what happened       No current facility-administered medications on file prior to encounter.      Current Outpatient Medications on File Prior to Encounter   Medication Sig    allopurinol (ZYLOPRIM) 100 MG tablet Take 100 mg by mouth once daily.    amLODIPine (NORVASC) 10 MG tablet Take 10 mg by mouth once daily.    aspirin (ECOTRIN) 81 MG EC tablet Take 81 mg by mouth once daily.      atorvastatin (LIPITOR) 80 MG tablet TAKE 1 TABLET (80 MG TOTAL) BY MOUTH ONCE DAILY.    budesonide-formoterol 160-4.5 mcg (SYMBICORT) 160-4.5 mcg/actuation HFAA Inhale 2 puffs into the lungs every 12 (twelve) hours. Controller    carvedilol (COREG) 12.5 MG tablet Take 12.5 mg by mouth 2 (two) times daily with meals.    celecoxib (CELEBREX) 200 MG capsule Take 1 capsule (200 mg total) by mouth once daily.    finasteride (PROSCAR) 5 mg tablet TAKE 1 TABLET ONCE DAILY.    fluticasone (FLONASE) 50 mcg/actuation nasal spray 2 sprays (100 mcg total) by Each Nare route once daily.    gabapentin (NEURONTIN) 300 MG  capsule Take 1 capsule (300 mg total) by mouth every evening.    isosorbide mononitrate (ISMO,MONOKET) 10 mg tablet TAKE 1 TABLET BY MOUTH EVERY DAY IN THE EVENING    meclizine (ANTIVERT) 25 mg tablet Take 1 tablet (25 mg total) by mouth 3 (three) times daily as needed for Dizziness.    mirtazapine (REMERON) 7.5 MG Tab TAKE 1 TABLET BY MOUTH EVERY EVENING.    montelukast (SINGULAIR) 10 mg tablet Take 10 mg by mouth every evening.    NITROSTAT 0.4 mg SL tablet PLACE 1 TABLET (0.4 MG TOTAL) UNDER THE TONGUE EVERY 5 (FIVE) MINUTES AS NEEDED FOR CHEST PAIN.    omeprazole (PRILOSEC) 20 MG capsule TAKE 1 CAPSULE BY MOUTH EVERY DAY    tamsulosin (FLOMAX) 0.4 mg Cap TAKE 1 CAPSULE BY MOUTH EVERY DAY    tiotropium bromide (SPIRIVA RESPIMAT) 1.25 mcg/actuation Mist Inhale 2.5 mcg into the lungs once daily. Controller    torsemide (DEMADEX) 20 MG Tab Take 1 tablet (20 mg total) by mouth 2 (two) times daily.    warfarin (COUMADIN) 7.5 MG tablet Take 1 tablet (7.5 mg total) by mouth Daily.    zolpidem (AMBIEN) 5 MG Tab TAKE 1 TABLET BY MOUTH EVERY EVENING AS NEEDED    albuterol (PROVENTIL/VENTOLIN HFA) 90 mcg/actuation inhaler 2 puffs every 4 hours as needed for cough, wheeze, or shortness of breath    albuterol-ipratropium (DUO-NEB) 2.5 mg-0.5 mg/3 mL nebulizer solution Take 3 mLs by nebulization every 6 (six) hours as needed for Wheezing or Shortness of Breath.     Family History     Problem Relation (Age of Onset)    Cancer Father    Heart disease Mother, Sister    Hypertension Brother    Pneumonia Sister    Stroke Sister    Sudden death Father        Tobacco Use    Smoking status: Never Smoker    Smokeless tobacco: Never Used   Substance and Sexual Activity    Alcohol use: No    Drug use: No    Sexual activity: Not on file     Review of Systems   Constitutional: Negative for fatigue and fever.   HENT: Negative for congestion and rhinorrhea.    Eyes: Negative for pain and visual disturbance.   Respiratory:  Negative for cough, shortness of breath and wheezing.    Cardiovascular: Negative for chest pain, palpitations and leg swelling.   Gastrointestinal: Negative for abdominal pain, nausea and vomiting.   Genitourinary: Negative for difficulty urinating, dysuria and hematuria.   Musculoskeletal: Negative for arthralgias and back pain.   Skin: Positive for color change. Negative for pallor.   Neurological: Positive for facial asymmetry, speech difficulty, weakness (R sided) and numbness. Negative for dizziness and headaches.   Hematological: Negative for adenopathy. Bruises/bleeds easily.   Psychiatric/Behavioral: Positive for confusion and decreased concentration. Negative for agitation. The patient is not nervous/anxious.      Objective:     Vital Signs (Most Recent):  Temp: 98.6 °F (37 °C) (04/05/19 1254)  Pulse: 79 (04/05/19 1254)  Resp: 16 (04/05/19 1254)  BP: (!) 150/76 (04/05/19 1254)  SpO2: 95 % (04/05/19 1254) Vital Signs (24h Range):  Temp:  [97.7 °F (36.5 °C)-99.1 °F (37.3 °C)] 98.6 °F (37 °C)  Pulse:  [51-83] 79  Resp:  [16-18] 16  SpO2:  [95 %-99 %] 95 %  BP: (124-177)/(48-90) 150/76     Weight: 106.6 kg (235 lb 0.2 oz)  Body mass index is 31.01 kg/m².    Physical Exam   Constitutional: He appears well-developed and well-nourished. No distress.   HENT:   Head: Normocephalic and atraumatic.   Mouth/Throat: No oropharyngeal exudate.   Eyes: Pupils are equal, round, and reactive to light. EOM are normal. Right eye exhibits no discharge. Left eye exhibits no discharge.   Neck: Normal range of motion. Neck supple.   Cardiovascular: Normal rate and intact distal pulses.   No murmur heard.  Pulmonary/Chest: Effort normal and breath sounds normal. No stridor. No respiratory distress. He has no wheezes. He exhibits no tenderness.   Abdominal: Soft. Bowel sounds are normal. There is no tenderness. There is no guarding.   Musculoskeletal: He exhibits no edema or tenderness.   Neurological: He is alert.   Follows  commands. Mostly nods to yes or no questions. Aphasic, dysarthria   Skin: Skin is warm and dry. He is not diaphoretic. There is erythema.   R groin hematoma extending to scrotum/pelvis, RUE and R foot bruising. Mild erythema to R forearm near wrist with vesicle, no TTP, no drainage --stable from yesterday   Psychiatric: He has a normal mood and affect. His behavior is normal.   Vitals reviewed.      Significant Labs:   CBC:   Recent Labs   Lab 04/04/19 0328 04/05/19 0455   WBC 6.58 6.36   HGB 7.8* 7.8*   HCT 23.8* 24.7*   * 139*       Recent Labs   Lab 04/04/19 0328 04/05/19 0455    137   K 4.3 4.6    107   CO2 24 21*    100   BUN 25* 38*   CREATININE 3.8* 5.1*   CALCIUM 8.5* 8.1*   PROT 5.3* 5.6*   ALBUMIN 2.7* 2.9*   BILITOT 1.3* 1.0   ALKPHOS 90 97   AST 19 20   ALT 23 24   ANIONGAP 7* 9   EGFRNONAA 14.2* 9.9*       Significant Imaging: I have reviewed all pertinent imaging results/findings within the past 24 hours.    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 No TTP  Drop in Hgb (8.6 > 7.7 > 7.8), pt had blood clots passed in urine  LE U/S negative for any fluid collection, aneurysm, or stenosis    Plan:  Cont to monitor daily CBCs  Cont to monitor size          Cellulitis of right arm  R forearm edema, mild erythema  Arterial line removed, evaluated by ortho for compartment syndrome, negative  Started on clindamycin 300 mg q6hrs for possible cellulitis in the NCC on 3/30  4/1 night with low grade fever T 100.6, hypotension x 1 though hypertensive otherwise  4/2 edema worsening, primary team d/c'd clindamycin and started IV vancomycin x 1 and cefepime. Random  vanc level in the AM  4/3 erythema stable, random vanc 13.5, procal 0.41    Plan:   Continue on oral Doxycycline 100 mg BID for total 7 days (last dose will be on 04/09/19)  Elevate R arm   RUE U/S negative for DVT        Paroxysmal atrial fibrillation  Per primary team. Risk factor for stroke  CHADsVASc 7  Coreg for rate control, coumadin 7.5 mg daily per primary team  At goal INR 2-3        ESRD (end stage renal disease) on dialysis  Nephrology following, HD MWF      Type 2 diabetes mellitus, without long-term current use of insulin  Hgb A1c 8.3  Per primary team, detemir 8 u qHS, MDSS, POCT glc q6hrs          Benign prostatic hyperplasia without lower urinary tract symptoms  -Cont flomax and finasteride  -Has been making urine and several episodes of incontinence, documented unmeasured urine per nursing. Frequency of urination has decreased as PO intake has decreased but pt is still making urine and soaking the pad  -UA negative for infection  -Noted bloody clots passed with urine per nursing. Urology consulted.   -Per Urology recs,  - Check PVR after patient is able to void  - Please call if residuals are over 300 cc  - Continue to monitor, suspect that bleeding is from prostate and will self resolve   - Patient may follow up with Dr. Payne as outpatient for discussion of hematuria workup  -Cont to monitor    Essential hypertension  Risk factor for stroke, primary team goal SBP <160  Cont Torsemide 20 mg q12, losartan 100 mg qd, and Carvedilol 25 mg q12                   VTE Risk Mitigation (From admission, onward)        Ordered     warfarin tablet 7.5 mg  Daily      04/03/19 0923     IP VTE HIGH RISK PATIENT  Once      03/22/19 2402              Trupti Reinoso MD  Department of Hospital Medicine   [facility]

## 2019-04-05 NOTE — ASSESSMENT & PLAN NOTE
H/o and CT showed enlarged prostate   - continue flomax and finasteride  -has been making urine and several episodes of incontinence per sister in law but has not urinated since yesterday  -u/a negative, bladder scan   - hematuria today with clots - urology consulted , condom cath ordered, resolved   -q 6 hour bladder scans

## 2019-04-05 NOTE — PROGRESS NOTES
HD treatment complete. Duration of treatment 3.5 hours and 2 L removed. Treatment was tolerated well and no complications with access to left upper arm. Needles removed and hemostasis achieved. Dressing intact and no drainage noted. Thrill and bruit present. No need for restraints at this time. Restraints removed.

## 2019-04-05 NOTE — ASSESSMENT & PLAN NOTE
77 y/o M with multiple co morbidities (A.fib, HTN, CHF, ESRD, DM) now with L PCA syndrome. 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  He has significant intracranial/extrancranial atherosclerotic disease seen on CTA head/neck. Etiology cardioembolic versus atheroembolic.      - Anticoagulants: Coumadin daily adjusting for INR goal of 2.0-3.0   - Statins: Atorvastatin- 80 mg daily  -Diagnostic studies pending: INR and cultures pending   - Aggressive risk factor modification: HTN, DM, HLD, A-Fib, CAD, intracranial atherosclerosis   - Rehab efforts: PT/OT to evaluate and treat ->recommending Rehab, SLP -> recommending Regular diet/nectar thick  - VTE prophylaxis: anticoagulated  - BP parameters: target sBP<160

## 2019-04-05 NOTE — ASSESSMENT & PLAN NOTE
Complicated by low platelets, need for anticoagulation  Urology consulted   q6 hour bladder scans for retention   Condom cath ordered - planning to monitor at this point.   Resolved

## 2019-04-05 NOTE — PROGRESS NOTES
Chronic HD treatment started. No complications with access to left upper arm. Lines secured and telemetry in place. No complaints of discomfort at this time.

## 2019-04-05 NOTE — PLAN OF CARE
Outcome: Ongoing (interventions implemented as appropriate)  Patient alert and oriented to self. POC reviewed with patient and daughter; medications reviewed, questions encouraged and answered, daughter verbalized understanding. Systolic BP remained below 160. Please see flowsheets for V/S information. At 2200, patient became agitated and continuously tried to throw himself over bedrails to get out of bed. Family at bedside requested to put siderails up x 4 for patient safety, and an avasys was ordered. No other acute events overnight. O2 & suction at bedside, bed locked in lowest position, siderails up x4 per family request, call light in reach. WCTM.

## 2019-04-05 NOTE — ASSESSMENT & PLAN NOTE
Patient is being followed by nephrology, appreciate their assistance  Continue scheduled HD, MWF    Dialysis today

## 2019-04-05 NOTE — PT/OT/SLP PROGRESS
Physical Therapy      Patient Name:  Micheal Hunt   MRN:  [id number]    Patient not seen today secondary to Dialysis(Attempted treatment 2x in morning and afternoon, patient receiving dialysis). Will follow-up 4/8/2019 as medically appropriate.    Salome Brennan, PT

## 2019-04-05 NOTE — ASSESSMENT & PLAN NOTE
Due to ESRD   receives EPO every 2 weeks   H/h continues to trend down.  Blood transfusion 03/31 1unit  Stable currently   Hematuria resolved

## 2019-04-05 NOTE — PT/OT/SLP PROGRESS
Occupational Therapy      Patient Name:  Micheal Hunt   MRN:  [id number]    Patient not seen today secondary to pt ESTELA for Dialysis  In AM. OT unable to return in PM.  Will follow-up next scheduled OT session.    Venecia Arellano, OT  4/5/2019

## 2019-04-05 NOTE — ASSESSMENT & PLAN NOTE
R forearm edema, mild erythema  Arterial line removed, evaluated by ortho for compartment syndrome, negative  Started on clindamycin 300 mg q6hrs for possible cellulitis in the [facility] on 3/30  4/1 night with low grade fever T 100.6, hypotension x 1 though hypertensive otherwise  4/2 edema worsening, primary team d/c'd clindamycin and started IV vancomycin x 1 and cefepime. Random vanc level in the AM  4/3 erythema stable, random vanc 13.5, procal 0.41    Plan:   Continue on oral Doxycycline 100 mg BID for total 7 days (last dose will be on 04/09/19)  Elevate R arm   RUE U/S negative for DVT

## 2019-04-05 NOTE — ASSESSMENT & PLAN NOTE
Sleep study in 2014  CONCLUSION:  Nocturnal polysomnography demonstrates:  1.  Obstructive sleep apnea to be present with 44.2 events an hour with   desaturation to 81%.  2.  Sinus rhythm with occasional PVC.    Apparently lost to follow up- per daughter, reported that no one ever called to tell him report   Snoring in the hospital overnight   Plan to try cpap in hospital -can take off if unable to tolerate   Stroke risk factor

## 2019-04-05 NOTE — SUBJECTIVE & OBJECTIVE
"Neurologic Chief Complaint: RSW + difficulty with speech    Subjective:     Interval History:   dialysis today, agitated overnight and throughout the day. Appears angry telling his family today "leave me alone, go home". Will plan for telesitter, pharmacologic therapy ordered prn tonight, with plans to involve psych if not able to control as at this time its causing him to be less interactive with therapy     HPI, Past Medical, Family, and Social History remains the same as documented in the initial encounter.     Review of Systems   Constitutional: Positive for fatigue. Negative for diaphoresis and fever.   Eyes: Positive for visual disturbance.   Genitourinary: Positive for hematuria.   Skin: Positive for color change.   Neurological: Positive for facial asymmetry, speech difficulty and weakness. Negative for seizures and light-headedness.   Psychiatric/Behavioral: Positive for sleep disturbance. Negative for agitation.     Scheduled Meds:   allopurinol  100 mg Oral Daily    atorvastatin  80 mg Oral Daily    carvedilol  25 mg Oral BID WM    doxycycline  100 mg Oral Q12H    finasteride  5 mg Oral Daily    gabapentin  300 mg Oral QHS    insulin detemir U-100  8 Units Subcutaneous Daily    losartan  100 mg Oral QHS    pantoprazole  40 mg Oral Daily    polyethylene glycol  17 g Oral Daily    senna-docusate 8.6-50 mg  1 tablet Oral BID    tamsulosin  1 capsule Oral Daily    torsemide  20 mg Oral BID    warfarin  7.5 mg Oral Daily     Continuous Infusions:    PRN Meds:sodium chloride, sodium chloride 0.9%, acetaminophen, albuterol-ipratropium, dextrose 50%, dextrose 50%, glucagon (human recombinant), glucose, glucose, insulin aspart U-100, nitroGLYCERIN, ondansetron, QUEtiapine, ramelteon    Objective:     Vital Signs (Most Recent):  Temp: 98.6 °F (37 °C) (04/05/19 1254)  Pulse: 64 (04/05/19 1625)  Resp: 18 (04/05/19 1625)  BP: (!) 131/58 (04/05/19 1625)  SpO2: (!) 93 % (04/05/19 1625)  BP Location: Right " arm    Vital Signs Range (Last 24H):  Temp:  [97.7 °F (36.5 °C)-99.1 °F (37.3 °C)]   Pulse:  [51-83]   Resp:  [16-18]   BP: (124-177)/(48-90)   SpO2:  [93 %-99 %]   BP Location: Right arm    Physical Exam   Constitutional: He appears well-developed and well-nourished. No distress.   HENT:   Head: Normocephalic and atraumatic.   Cardiovascular: Normal rate.   Pulmonary/Chest: Effort normal. No accessory muscle usage. No respiratory distress.   Genitourinary:   Genitourinary Comments: Hematoma in the R groin   Skin: Skin is warm. He is not diaphoretic.   Nursing note and vitals reviewed.    Neurological Exam:   LOC: drowsy and agitated upon waking   Language and articulation:expressive (nods appropriately) and receptive  Visual Fields: no blink to threat right    Decreased sensation on right   Motor: right upper extremity 1/5 , RLE 2/5  Left side 4/5   Attempts to read cards but has difficulty with speech and aphasia     Laboratory:  Recent Results (from the past 24 hour(s))   POCT glucose    Collection Time: 04/04/19  9:35 PM   Result Value Ref Range    POCT Glucose 168 (H) 70 - 110 mg/dL   Comprehensive metabolic panel    Collection Time: 04/05/19  4:55 AM   Result Value Ref Range    Sodium 137 136 - 145 mmol/L    Potassium 4.6 3.5 - 5.1 mmol/L    Chloride 107 95 - 110 mmol/L    CO2 21 (L) 23 - 29 mmol/L    Glucose 100 70 - 110 mg/dL    BUN, Bld 38 (H) 8 - 23 mg/dL    Creatinine 5.1 (H) 0.5 - 1.4 mg/dL    Calcium 8.1 (L) 8.7 - 10.5 mg/dL    Total Protein 5.6 (L) 6.0 - 8.4 g/dL    Albumin 2.9 (L) 3.5 - 5.2 g/dL    Total Bilirubin 1.0 0.1 - 1.0 mg/dL    Alkaline Phosphatase 97 55 - 135 U/L    AST 20 10 - 40 U/L    ALT 24 10 - 44 U/L    Anion Gap 9 8 - 16 mmol/L    eGFR if African American 11.5 (A) >60 mL/min/1.73 m^2    eGFR if non African American 9.9 (A) >60 mL/min/1.73 m^2   Protime-INR    Collection Time: 04/05/19  4:55 AM   Result Value Ref Range    Prothrombin Time 22.5 (H) 9.0 - 12.5 sec    INR 2.3 (H) 0.8 -  1.2   CBC auto differential    Collection Time: 04/05/19  4:55 AM   Result Value Ref Range    WBC 6.36 3.90 - 12.70 K/uL    RBC 2.52 (L) 4.60 - 6.20 M/uL    Hemoglobin 7.8 (L) 14.0 - 18.0 g/dL    Hematocrit 24.7 (L) 40.0 - 54.0 %    MCV 98 82 - 98 fL    MCH 31.0 27.0 - 31.0 pg    MCHC 31.6 (L) 32.0 - 36.0 g/dL    RDW 15.6 (H) 11.5 - 14.5 %    Platelets 139 (L) 150 - 350 K/uL    MPV 9.7 9.2 - 12.9 fL    Immature Granulocytes 1.1 (H) 0.0 - 0.5 %    Gran # (ANC) 4.2 1.8 - 7.7 K/uL    Immature Grans (Abs) 0.07 (H) 0.00 - 0.04 K/uL    Lymph # 1.4 1.0 - 4.8 K/uL    Mono # 0.5 0.3 - 1.0 K/uL    Eos # 0.1 0.0 - 0.5 K/uL    Baso # 0.03 0.00 - 0.20 K/uL    nRBC 0 0 /100 WBC    Gran% 66.0 38.0 - 73.0 %    Lymph% 21.9 18.0 - 48.0 %    Mono% 8.5 4.0 - 15.0 %    Eosinophil% 2.0 0.0 - 8.0 %    Basophil% 0.5 0.0 - 1.9 %    Differential Method Automated    Phosphorus    Collection Time: 04/05/19  4:55 AM   Result Value Ref Range    Phosphorus 3.3 2.7 - 4.5 mg/dL   POCT glucose    Collection Time: 04/05/19 11:45 AM   Result Value Ref Range    POCT Glucose 110 70 - 110 mg/dL   POCT glucose    Collection Time: 04/05/19  4:20 PM   Result Value Ref Range    POCT Glucose 147 (H) 70 - 110 mg/dL       Diagnostic Results     Brain Imaging   CTH non-contrast (3/28/2019):   Evolving subacute to chronic appearing infarcts in L thalamus and L PCA territory  small remote lacune in R putamen.  Cytotoxic cerebral edema    MRI Brain (3/26/19):  New acute left PCA territory distribution infarctions involving L thalamus and L paramedian occipital lobe, as well as L splenium of the corpus callosum   Generalized cerebral volume loss   significant chronic microvascular ischemic disease.      MRI Brain (3/24/19):  Vascular findings similar to CTH and CTA    Vessel Imaging   CTA-MP (3/24/19):  Extensive intracranial atherosclerotic disease:  Left PCA with a high-grade stenosis. High-grade (>70%) stenosis right proximal ICA and moderate (50-70%) stenosis  left proximal ICA. Moderate to high-grade stenosis of the intra cavernous/ supraclinoid ICAs bilaterally. High-grade stenosis distal left vertebral artery.    Cardiac Imaging   TTE (3/23/19):  EF 55%, LVH  severe bilateral atrial enlargement   PA pressure  41   PFO with left to right shunting

## 2019-04-05 NOTE — PROGRESS NOTES
"HEMODIALYSIS NOTE  Patient evaluated while undergoing hemodialysis indicated for ESRD. Tolerating session with current UFR, no complications. . UF goal 2L as tolerated.    Per HD staff, pt agitated and attempted to kick the staff; restraint applied to access arm and L leg per HD staff; Plan of Care note by FERMIN Rosario, RN 4/5/ 0642 reviewed; notified Primary Team and discussed option for a sitter    Pt disoriented to name, age, and place.    -receiving HD twice weekly 2/2 good residual renal function  -on torsemide BID; [place] bag noted at bedside w/ 300ml CYU; per HD RN, notified by floor RN that condom cath "came off" and pt is currently wearing a diaper  -daily bed weights and charts   -pre-HD wt today 102.0kg per HD RN (and includes all bed linens, pillows, etc.)  -hold epogen  -start venofer 100mg IV x 10 doses on HD  -continue renal diet      "

## 2019-04-05 NOTE — PROGRESS NOTES
Pt arrived in dialysis per bed. Combative, trying to kick staff. SUSHIL Hinson NP here and aware. Arm and leg restraint applied L side only. Able to cannulate L UA AVF with additional staff assistance. Pt calm after dialysis started.

## 2019-04-05 NOTE — PT/OT/SLP PROGRESS
Speech Language Pathology      Micheal Hunt  MRN: [id number]    Patient not seen today secondary to dialysis during 1st 2 attempts, Increased agitation & Patient unwilling to participate during 3rd attempt.  Pt was still not agreeable to participate following education regarding purpose of ST and goals to address dysphagia.  Will follow-up next scheduled therapy session.    KRISTIN Murray, CCC-SLP     KRISTIN Murray, CCC-SLP  Speech Language Pathologist  [phone]  4/5/2019

## 2019-04-05 NOTE — ASSESSMENT & PLAN NOTE
Monitoring daily,continue to worsen  H/h decreasing Discontinued Heparin gtt  Stat right femoral arterial ultrasound ordered and negative for vasculature abnormalities   Soft - continue to monitor

## 2019-04-06 LAB
ALBUMIN SERPL BCP-MCNC: 2.6 G/DL (ref 3.5–5.2)
ALP SERPL-CCNC: 99 U/L (ref 55–135)
ALT SERPL W/O P-5'-P-CCNC: 23 U/L (ref 10–44)
ANION GAP SERPL CALC-SCNC: 11 MMOL/L (ref 8–16)
AST SERPL-CCNC: 21 U/L (ref 10–40)
BASOPHILS # BLD AUTO: 0.01 K/UL (ref 0–0.2)
BASOPHILS NFR BLD: 0.2 % (ref 0–1.9)
BILIRUB SERPL-MCNC: 1.1 MG/DL (ref 0.1–1)
BUN SERPL-MCNC: 30 MG/DL (ref 8–23)
CALCIUM SERPL-MCNC: 7.8 MG/DL (ref 8.7–10.5)
CHLORIDE SERPL-SCNC: 105 MMOL/L (ref 95–110)
CO2 SERPL-SCNC: 21 MMOL/L (ref 23–29)
CREAT SERPL-MCNC: 4.2 MG/DL (ref 0.5–1.4)
DIFFERENTIAL METHOD: ABNORMAL
EOSINOPHIL # BLD AUTO: 0.1 K/UL (ref 0–0.5)
EOSINOPHIL NFR BLD: 2.2 % (ref 0–8)
ERYTHROCYTE [DISTWIDTH] IN BLOOD BY AUTOMATED COUNT: 15.5 % (ref 11.5–14.5)
EST. GFR  (AFRICAN AMERICAN): 14.5 ML/MIN/1.73 M^2
EST. GFR  (NON AFRICAN AMERICAN): 12.6 ML/MIN/1.73 M^2
GLUCOSE SERPL-MCNC: 93 MG/DL (ref 70–110)
HCT VFR BLD AUTO: 24.4 % (ref 40–54)
HGB BLD-MCNC: 8 G/DL (ref 14–18)
IMM GRANULOCYTES # BLD AUTO: 0.04 K/UL (ref 0–0.04)
IMM GRANULOCYTES NFR BLD AUTO: 0.7 % (ref 0–0.5)
INR PPP: 2.8 (ref 0.8–1.2)
LYMPHOCYTES # BLD AUTO: 1.5 K/UL (ref 1–4.8)
LYMPHOCYTES NFR BLD: 28.2 % (ref 18–48)
MCH RBC QN AUTO: 31.4 PG (ref 27–31)
MCHC RBC AUTO-ENTMCNC: 32.8 G/DL (ref 32–36)
MCV RBC AUTO: 96 FL (ref 82–98)
MONOCYTES # BLD AUTO: 0.6 K/UL (ref 0.3–1)
MONOCYTES NFR BLD: 10.5 % (ref 4–15)
NEUTROPHILS # BLD AUTO: 3.2 K/UL (ref 1.8–7.7)
NEUTROPHILS NFR BLD: 58.2 % (ref 38–73)
NRBC BLD-RTO: 0 /100 WBC
PHOSPHATE SERPL-MCNC: 2.8 MG/DL (ref 2.7–4.5)
PLATELET # BLD AUTO: 141 K/UL (ref 150–350)
PMV BLD AUTO: 9.7 FL (ref 9.2–12.9)
POCT GLUCOSE: 100 MG/DL (ref 70–110)
POCT GLUCOSE: 142 MG/DL (ref 70–110)
POCT GLUCOSE: 168 MG/DL (ref 70–110)
POTASSIUM SERPL-SCNC: 3.8 MMOL/L (ref 3.5–5.1)
PROT SERPL-MCNC: 5.4 G/DL (ref 6–8.4)
PROTHROMBIN TIME: 27.4 SEC (ref 9–12.5)
RBC # BLD AUTO: 2.55 M/UL (ref 4.6–6.2)
SODIUM SERPL-SCNC: 137 MMOL/L (ref 136–145)
WBC # BLD AUTO: 5.43 K/UL (ref 3.9–12.7)

## 2019-04-06 PROCEDURE — 85610 PROTHROMBIN TIME: CPT

## 2019-04-06 PROCEDURE — S5571 INSULIN DISPOS PEN 3 ML: HCPCS | Performed by: STUDENT IN AN ORGANIZED HEALTH CARE EDUCATION/TRAINING PROGRAM

## 2019-04-06 PROCEDURE — 80053 COMPREHEN METABOLIC PANEL: CPT

## 2019-04-06 PROCEDURE — 25000003 PHARM REV CODE 250: Performed by: PSYCHIATRY & NEUROLOGY

## 2019-04-06 PROCEDURE — 25000003 PHARM REV CODE 250: Performed by: INTERNAL MEDICINE

## 2019-04-06 PROCEDURE — 84100 ASSAY OF PHOSPHORUS: CPT

## 2019-04-06 PROCEDURE — 99900035 HC TECH TIME PER 15 MIN (STAT)

## 2019-04-06 PROCEDURE — 94660 CPAP INITIATION&MGMT: CPT

## 2019-04-06 PROCEDURE — 25000003 PHARM REV CODE 250: Performed by: HOSPITALIST

## 2019-04-06 PROCEDURE — 63600175 PHARM REV CODE 636 W HCPCS: Performed by: STUDENT IN AN ORGANIZED HEALTH CARE EDUCATION/TRAINING PROGRAM

## 2019-04-06 PROCEDURE — 20600001 HC STEP DOWN PRIVATE ROOM

## 2019-04-06 PROCEDURE — 25000003 PHARM REV CODE 250: Performed by: NURSE PRACTITIONER

## 2019-04-06 PROCEDURE — 85025 COMPLETE CBC W/AUTO DIFF WBC: CPT

## 2019-04-06 PROCEDURE — 99233 SBSQ HOSP IP/OBS HIGH 50: CPT | Mod: GC,,, | Performed by: PSYCHIATRY & NEUROLOGY

## 2019-04-06 PROCEDURE — 99233 PR SUBSEQUENT HOSPITAL CARE,LEVL III: ICD-10-PCS | Mod: GC,,, | Performed by: PSYCHIATRY & NEUROLOGY

## 2019-04-06 PROCEDURE — 36415 COLL VENOUS BLD VENIPUNCTURE: CPT

## 2019-04-06 PROCEDURE — 25000003 PHARM REV CODE 250: Performed by: PHYSICIAN ASSISTANT

## 2019-04-06 PROCEDURE — 25000003 PHARM REV CODE 250: Performed by: STUDENT IN AN ORGANIZED HEALTH CARE EDUCATION/TRAINING PROGRAM

## 2019-04-06 RX ORDER — WARFARIN SODIUM 5 MG/1
5 TABLET ORAL DAILY
Status: DISCONTINUED | OUTPATIENT
Start: 2019-04-06 | End: 2019-04-07

## 2019-04-06 RX ADMIN — WARFARIN SODIUM 5 MG: 5 TABLET ORAL at 05:04

## 2019-04-06 RX ADMIN — ATORVASTATIN CALCIUM 80 MG: 20 TABLET, FILM COATED ORAL at 08:04

## 2019-04-06 RX ADMIN — INSULIN ASPART 2 UNITS: 100 INJECTION, SOLUTION INTRAVENOUS; SUBCUTANEOUS at 12:04

## 2019-04-06 RX ADMIN — RAMELTEON 8 MG: 8 TABLET, FILM COATED ORAL at 08:04

## 2019-04-06 RX ADMIN — LOSARTAN POTASSIUM 100 MG: 50 TABLET, FILM COATED ORAL at 08:04

## 2019-04-06 RX ADMIN — DOXYCYCLINE HYCLATE 100 MG: 100 TABLET, COATED ORAL at 08:04

## 2019-04-06 RX ADMIN — ALLOPURINOL 100 MG: 100 TABLET ORAL at 10:04

## 2019-04-06 RX ADMIN — TORSEMIDE 20 MG: 20 TABLET ORAL at 10:04

## 2019-04-06 RX ADMIN — CARVEDILOL 25 MG: 25 TABLET, FILM COATED ORAL at 05:04

## 2019-04-06 RX ADMIN — DOXYCYCLINE HYCLATE 100 MG: 100 TABLET, COATED ORAL at 10:04

## 2019-04-06 RX ADMIN — QUETIAPINE FUMARATE 25 MG: 25 TABLET ORAL at 08:04

## 2019-04-06 RX ADMIN — STANDARDIZED SENNA CONCENTRATE AND DOCUSATE SODIUM 1 TABLET: 8.6; 5 TABLET, FILM COATED ORAL at 10:04

## 2019-04-06 RX ADMIN — TORSEMIDE 20 MG: 20 TABLET ORAL at 08:04

## 2019-04-06 RX ADMIN — TAMSULOSIN HYDROCHLORIDE 0.4 MG: 0.4 CAPSULE ORAL at 10:04

## 2019-04-06 RX ADMIN — FINASTERIDE 5 MG: 5 TABLET, FILM COATED ORAL at 10:04

## 2019-04-06 RX ADMIN — CARVEDILOL 25 MG: 25 TABLET, FILM COATED ORAL at 10:04

## 2019-04-06 RX ADMIN — INSULIN DETEMIR 8 UNITS: 100 INJECTION, SOLUTION SUBCUTANEOUS at 10:04

## 2019-04-06 RX ADMIN — PANTOPRAZOLE SODIUM 40 MG: 40 TABLET, DELAYED RELEASE ORAL at 10:04

## 2019-04-06 RX ADMIN — GABAPENTIN 300 MG: 300 CAPSULE ORAL at 08:04

## 2019-04-06 NOTE — ASSESSMENT & PLAN NOTE
H/o and CT showed enlarged prostate   - continue flomax and finasteride  -has been making urine and several episodes of incontinence per sister in law but has not urinated since yesterday  -u/a negative, bladder scan   - hematuria with clots - urology consulted , condom cath ordered, resolved   -q 6 hour bladder scans

## 2019-04-06 NOTE — ASSESSMENT & PLAN NOTE
79 y/o M with multiple co morbidities (A.fib, HTN, CHF, ESRD, DM) now with L PCA syndrome. 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  He has significant intracranial/extrancranial atherosclerotic disease seen on CTA head/neck. Etiology cardioembolic versus atheroembolic.      - Anticoagulants: Coumadin daily adjusting for INR goal of 2.0-3.0   - Statins: Atorvastatin- 80 mg daily  -Diagnostic studies pending: INR   - Aggressive risk factor modification: HTN, DM, HLD, A-Fib, CAD, intracranial atherosclerosis   - Rehab efforts: PT/OT to evaluate and treat ->recommending Rehab, SLP -> recommending Regular diet/nectar thick  - VTE prophylaxis: anticoagulated  - BP parameters: target sBP<160

## 2019-04-06 NOTE — PLAN OF CARE
Problem: Adult Inpatient Plan of Care  Goal: Plan of Care Review  Past Medical History:  7/29/2016: NICK (acute kidney injury)  No date: Allergy  12/15/2014: Anemia, mild  No date: Arthritis      Comment:  Gout  3/27/2012: Benign essential HTN  5/10/2018: BMI 29.0-29.9,adult  No date: BPH (benign prostatic hyperplasia)  No date: BPH (benign prostatic hyperplasia)  2006: CAD (coronary artery disease)  No date: Chronic kidney disease      Comment:  due to ibuprofen  No date: Colon polyp  No date: CRF (chronic renal failure), stage 5  No date: Diverticulosis  No date: Gastritis  No date: GERD (gastroesophageal reflux disease)  No date: Gout  5/3/2018: History of colon polyps  3/27/2012: HTN (hypertension)  No date: Hyperlipidemia  No date: Hyperlipidemia  6/14/2018: LLL pneumonia  No date: LVH (left ventricular hypertrophy)  No date: Mesenteric ischemia  3/27/2012: Murmur, cardiac  No date: GRICELDA (obstructive sleep apnea)      Comment:  DOES NOT USE A MACHINE  No date: Sinus problem  No date: Syncope and collapse      Past Surgical History:  No date: APPENDECTOMY  5/31/2016: BLOCK-NERVE; Left      Comment:  Performed by Kevin Leon MD at [facility] OR  No date: CARDIAC CATHETERIZATION  2011: COLONOSCOPY  5/3/2018: COLONOSCOPY; N/A      Comment:  Performed by Messi Harris MD at [facility] ENDO  9/10/2015: COLONOSCOPY; N/A      Comment:  Performed by Messi Harris MD at [facility] ENDO  4/2007: CORONARY ARTERY BYPASS GRAFT      Comment:  x 1  8/30/2017: CYSTOSCOPY; N/A      Comment:  Performed by Rudy Herring MD at [facility] OR  11/10/2015: CYSTOSCOPY; N/A      Comment:  Performed by Rudy Herring MD at [facility] OR  1/4/2016: ESOPHAGOGASTRODUODENOSCOPY (EGD); N/A      Comment:  Performed by Tra Brooks MD at [facility] ENDO (2ND FLR)  10/15/2014: ESOPHAGOGASTRODUODENOSCOPY (EGD); N/A      Comment:  Performed by Messi Harris MD at [facility] ENDO  2/25/2016: INJECTION-STEROID-EPIDURAL-TRANSFORAMINAL; Left      Comment:  Performed by  Kevin Leon MD at [facility] OR  No date: JOINT REPLACEMENT      Comment:  left knee total replacement  X 3  No date: mid leftt finger      Comment:  from a donovantelkin  2016: MOLE REMOVAL  4/11/2016: RADIOFREQUENCY THERMOCOAGULATION (RFTC)-NERVE-MEDIAN   BRANCH-LUMBAR; Left      Comment:  Performed by Kevin Leon MD at [facility] OR  No date: rotative cuff      Comment:  no rotative cuffs on bilat shoulders has pins   No date: SHOULDER SURGERY      Comment:  shoulder surgery bilat  RIGHT X 4; LEFT X 3  11/10/2015: TRANSRECTAL ULTRASOUND GUIDED PROSTATE BIOPSY; Bilateral      Comment:  Performed by Rudy Herring MD at [facility] OR  5/31/2017: ULTRASOUND-ENDOSCOPIC-UPPER; N/A      Comment:  Performed by Tra Brooks MD at [facility] ENDO (2ND FLR)  1/4/2016: ULTRASOUND-ENDOSCOPIC-UPPER; N/A      Comment:  Performed by Tra Brooks MD at [facility] ENDO (2ND FLR)  1/16/2015: ULTRASOUND-ENDOSCOPIC-UPPER; N/A      Comment:  Performed by Jason Saleem MD at [facility] ENDO (2ND FLR)    Patient likes blankets on.    Outcome: Ongoing (interventions implemented as appropriate)  Pt lying quietly in bed with eyes closed. Pt VSS, even and unlabored resp no distress noted at this time. Pt refused CPAP. 3L NC placed by Resp Therapist. 2 daughters were at bedside in concern of fathers care. SCDs placed on pt in request of family members. BBSX4 Quads. Incontinent X2 with adult brief for extra protection. Pt received bath oncoming of shift. Pt disorientedX3 oriented to self. Q2h rounding and PRN. Bed @ lowest position,bed alarm set, call bell in reach, instructed to call when assistance is needed. Will continue to monitor.

## 2019-04-06 NOTE — PROGRESS NOTES
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  He has significant intracranial/extrancranial atherosclerotic disease seen on CTA head/neck. Etiology cardioembolic versus atheroembolic.      - Anticoagulants: Coumadin daily adjusting for INR goal of 2.0-3.0   - Statins: Atorvastatin- 80 mg daily  -Diagnostic studies pending: INR   - Aggressive risk factor modification: HTN, DM, HLD, A-Fib, CAD, intracranial atherosclerosis   - Rehab efforts: PT/OT to evaluate and treat ->recommending Rehab, SLP -> recommending Regular diet/nectar thick  - VTE prophylaxis: anticoagulated  - BP parameters: target sBP<160      Cellulitis of right arm  R forearm edema   Arterial line removed, evaluated by ortho for compartment syndrome which was negative as well as US  Tried vanc and clinda   De-escalated at this time and on [facility] medicine managing   signifcant improvement         Cytotoxic cerebral edema  Large area of cytotoxic cerebral edema identified when reviewing brain imaging in the territory of the left posterior cerebral artery. There is mass effect associated with it. We will continue to monitor the patients clinical exam for any worsening of symptoms which may indicate expansion of the stroke or the area of the edema resulting in the clinical change. The pattern is suggestive of atheroembolic etiology.      Hemoglobin drop  Within the last 3 days from 8.2 >7.9 >7.1    - Discontinued heparin gtt, now coumadin   - will transfuse with pRBC x1 (consent obtained from daughter Tonya Hunt via phone)  - continue to monitor     Groin hematoma  Monitoring daily,continue to worsen  H/h decreasing Discontinued Heparin gtt  Stat right femoral arterial ultrasound ordered  and negative for vasculature abnormalities   Soft - continue to monitor     Dysarthria due to acute cerebrovascular accident (CVA)  - recommend patient be re-evaluated by SLP  - recommend regular diet with nectar thick   When awake     Patent foramen ovale  Risk factor for stroke  Seen on echocardiogram     Paroxysmal atrial fibrillation  Risk factor for stroke   CHADsVASc 7    - Carvedilol for rate control   -coumadin daily     ESRD (end stage renal disease) on dialysis  Patient is being followed by nephrology, appreciate their assistance  Continue scheduled HD, MWF    Dialysis today     NSTEMI (non-ST elevated myocardial infarction)  Seen by cards on 3/25/19   Continue medical management   Does not complain of chest pain at this time     Type 2 diabetes mellitus, without long-term current use of insulin  Poorly controlled, A1C 8.3  Current glucose running ~100-150  Detemir 8 units daily  MD SSI  POCt glucose q6 hr    Long term (current) use of anticoagulants  - coumadin daily  INR 2.3 today     Benign prostatic hyperplasia without lower urinary tract symptoms  H/o and CT showed enlarged prostate   - continue flomax and finasteride  -has been making urine and several episodes of incontinence per sister in law but has not urinated since yesterday  -u/a negative, bladder scan   - hematuria with clots - urology consulted , condom cath ordered, resolved   -q 6 hour bladder scans     Anemia of chronic disease  Due to ESRD   receives EPO every 2 weeks   H/h continues to trend down.  Blood transfusion 03/31 1unit  Stable currently   Hematuria resolved       Obstructive sleep apnea  Sleep study in 2014  CONCLUSION:  Nocturnal polysomnography demonstrates:  1.  Obstructive sleep apnea to be present with 44.2 events an hour with   desaturation to 81%.  2.  Sinus rhythm with occasional PVC.    Apparently lost to follow up- per daughter, reported that no one ever called to tell him report   Snoring in the hospital overnight    Plan to try cpap in hospital -can take off if unable to tolerate   Stroke risk factor     Gout, arthritis  - continue allopurinol    CAD (coronary artery disease), 2V CABG 2007  Risk factor for stroke  Continue statin.       Hyperlipidemia  LDL 46  Continue atorvastatin 80 mg due to extensive intracranial atherosclerotic disease      Essential hypertension  Risk factor for stroke   SBP<160  continue Torsemide 20 mg q12, losartan 100 mg qd, and Carvedilol 25 mg q12        Carotid artery stenosis  Noted CTA and carotid US  Patient evaluated by vascular surgery, recommended maximal medical management    Hematuria  Complicated by low platelets, need for anticoagulation  Urology consulted   q6 hour bladder scans for retention   Condom cath ordered - planning to monitor at this point.   Resolved     Umbilical hernia  + abdominal pain during hospitalization  CT scan ordered showed umbilical hernia  General surgery consulted and recommended outpatient follow up        Admitted to hospital medicine for unstable angina eval, vascular neurology consulted for right sided weakness, facial droop and dysarthria, MRI showed L PCA infarction, not candidate for TPA ( symptoms duration >4 hours)patient symptoms improved with laying flat, admitted to neuro critical care. Patient with known PCA infarct on L with undulating symptom pattern with modification to level of recumbency. Patient restarted on heparin gtt. Underwent US of LE and of carotid with evidence of <50% L ICA stenosis and 60-70% R ICA stenosis, heavily calcified on CTA, additionally L vertebral also heavily calcified. Patient tolerated HD. Neuro deficit appear to be more severe on 3/27 with decreasing right arm strength now 2/5 and with worsening dysarthria/aphasia. Patient with gross swelling of R forearm - tense, pulses intact.   03/29/2019 patient with worsening of neurological deficit, unable to move right upper extremity, patient with pain on passive movement of  "right fingers exhibited by facial grimace as verbal response to pain/sensation/light touch no longer reliable, aphasia/dysarthria worsening, RLE with 2/5 strength today, patient evaluated by orthopedic surgery regarding R forearm yesterday and today and feel that this is not compartment syndrome and will continue to monitor, distal pulses remain palpable  3/30: NAEON. On heparin gtt. Plans to step down  3/31: stepped down to VN without major events. transfused with pRBC x1 due to drop in Hbg. Discontinued heparin gtt. INR increased to 1.2, increased warfarin to 7.5  04/01/19 Visited patient in dialysis today.  On coumadin and atorvastatin for secondary stroke prevention H/H stabilized at 7.6 after 1 unit pRBC transfusion on 03/31.  Epo given during dialysis.  Patient has worsening right groin hematoma.  Stat right femoral artery u/s ordered.  On clindamycin for suspected right arm cellulitis.  Sister-in-law concern for urination.  Patient was incontinent and making urine but today has not had any urination.  U/A and bladder scan ordered   04/02/19 hospital medicine consulted for multiple medical issues. Right arm edema worsening.  Switching antibiotics from oral to IV     4/3/19 - drowsy today, less interactive. Plan for dialysis today. Noted to have gross hematuria with clots , bladder scan 315.   4/4/19 - will place condom catheter for hematuria monitoring, H&H stable. Hospital medicine helping with cellulitis. cpap ordered for night  4/5/19 - dialysis today, agitated overnight and throughout the day. Appears angry telling his family today "leave me alone, go home". Will plan for telesitter, pharmacologic therapy ordered prn tonight, with plans to involve psych if not able to control as at this time its causing him to be less interactive with therapy   04/06 Patient agitation improved with Seroquel and ramelteon.  INR increased to 2.8.  Coumadin decreased from 7.5mg to 5mg qd.  Will continue to monitor daily.  " Edema in left arm improving, will continue doxycycline for cellulitis     STROKE DOCUMENTATION        NIH Scale:  1a. Level of Consciousness: 0-->Alert, keenly responsive  1b. LOC Questions: 2-->Answers neither question correctly  1c. LOC Commands: 1-->Performs one task correctly  2. Best Gaze: 0-->Normal  3. Visual: 2-->Complete hemianopia  4. Facial Palsy: 0-->Normal symmetrical movements  5a. Motor Arm, Left: 1-->Drift, limb holds 90 (or 45) degrees, but drifts down before full 10 seconds, does not hit bed or other support  5b. Motor Arm, Right: 3-->No effort against gravity, limb falls  6a. Motor Leg, Left: 1-->Drift, leg falls by the end of the 5-sec period but does not hit bed  6b. Motor Leg, Right: 3-->No effort against gravity, leg falls to bed immediately  7. Limb Ataxia: 0-->Absent  8. Sensory: 1-->Mild-to-moderate sensory loss, patient feels pinprick is less sharp or is dull on the affected side, or there is a loss of superficial pain with pinprick, but patient is aware of being touched  9. Best Language: 2-->Severe aphasia, all communication is through fragmentary expression, great need for inference, questioning, and guessing by the listener. Range of information that can be exchanged is limited, listener carries burden of. . . (see row details)  10. Dysarthria: 0-->Normal  11. Extinction and Inattention (formerly Neglect): 0-->No abnormality  Total (NIH Stroke Scale): 16       Modified Steve    Sachin Coma Scale:    ABCD2 Score:    [id number] Score:8  HAS -BLED Score:   ICH Score:   Hunt & Sharp Classification:      Hemorrhagic change of an Ischemic Stroke: Does this patient have an ischemic stroke with hemorrhagic changes? No     Neurologic Chief Complaint: RSW + difficulty with speech    Subjective:     Interval History: Patient agitation improved with Seroquel and ramelteon.  INR increased to 2.8.  Coumadin decreased from 7.5mg to 5mg qd.  Will continue to monitor daily.  Edema in left arm  improving, will continue doxycycline for cellulitis     HPI, Past Medical, Family, and Social History remains the same as documented in the initial encounter.     Review of Systems   Constitutional: Positive for fatigue. Negative for fever.   Eyes: Positive for visual disturbance.   Skin: Positive for wound.   Neurological: Positive for facial asymmetry, speech difficulty and weakness.   Psychiatric/Behavioral: Negative for agitation and sleep disturbance (improved).     Scheduled Meds:   allopurinol  100 mg Oral Daily    atorvastatin  80 mg Oral Daily    carvedilol  25 mg Oral BID WM    doxycycline  100 mg Oral Q12H    finasteride  5 mg Oral Daily    gabapentin  300 mg Oral QHS    insulin detemir U-100  8 Units Subcutaneous Daily    losartan  100 mg Oral QHS    pantoprazole  40 mg Oral Daily    polyethylene glycol  17 g Oral Daily    senna-docusate 8.6-50 mg  1 tablet Oral BID    tamsulosin  1 capsule Oral Daily    torsemide  20 mg Oral BID    warfarin  5 mg Oral Daily     Continuous Infusions:    PRN Meds:sodium chloride, sodium chloride 0.9%, acetaminophen, albuterol-ipratropium, dextrose 50%, dextrose 50%, glucagon (human recombinant), glucose, glucose, insulin aspart U-100, nitroGLYCERIN, ondansetron, QUEtiapine, ramelteon    Objective:     Vital Signs (Most Recent):  Temp: 96.7 °F (35.9 °C) (04/06/19 1242)  Pulse: 61 (04/06/19 1242)  Resp: 16 (04/06/19 1242)  BP: 128/61 (04/06/19 1242)  SpO2: 100 % (04/06/19 1242)  BP Location: Right arm    Vital Signs Range (Last 24H):  Temp:  [96.7 °F (35.9 °C)-99.4 °F (37.4 °C)]   Pulse:  [58-85]   Resp:  [16-18]   BP: (128-145)/(58-76)   SpO2:  [88 %-100 %]   BP Location: Right arm    Physical Exam   Constitutional: He appears well-developed and well-nourished. No distress.   HENT:   Head: Normocephalic and atraumatic.   Cardiovascular: Normal rate.   Pulmonary/Chest: Effort normal. No accessory muscle usage. No respiratory distress.   Genitourinary:    Genitourinary Comments: Hematoma in the R groin   Skin: Skin is warm. He is not diaphoretic.   Nursing note and vitals reviewed.    Neurological Exam:   LOC: drowsy and agitated upon waking   Language and articulation:expressive (nods appropriately) and receptive  Visual Fields: no blink to threat right    Decreased sensation on right   Motor: right upper extremity 1/5 , RLE 2/5  Left side 4/5       Laboratory:  Recent Results (from the past 24 hour(s))   POCT glucose    Collection Time: 04/05/19  4:20 PM   Result Value Ref Range    POCT Glucose 147 (H) 70 - 110 mg/dL   Comprehensive metabolic panel    Collection Time: 04/06/19  4:21 AM   Result Value Ref Range    Sodium 137 136 - 145 mmol/L    Potassium 3.8 3.5 - 5.1 mmol/L    Chloride 105 95 - 110 mmol/L    CO2 21 (L) 23 - 29 mmol/L    Glucose 93 70 - 110 mg/dL    BUN, Bld 30 (H) 8 - 23 mg/dL    Creatinine 4.2 (H) 0.5 - 1.4 mg/dL    Calcium 7.8 (L) 8.7 - 10.5 mg/dL    Total Protein 5.4 (L) 6.0 - 8.4 g/dL    Albumin 2.6 (L) 3.5 - 5.2 g/dL    Total Bilirubin 1.1 (H) 0.1 - 1.0 mg/dL    Alkaline Phosphatase 99 55 - 135 U/L    AST 21 10 - 40 U/L    ALT 23 10 - 44 U/L    Anion Gap 11 8 - 16 mmol/L    eGFR if African American 14.5 (A) >60 mL/min/1.73 m^2    eGFR if non African American 12.6 (A) >60 mL/min/1.73 m^2   Protime-INR    Collection Time: 04/06/19  4:21 AM   Result Value Ref Range    Prothrombin Time 27.4 (H) 9.0 - 12.5 sec    INR 2.8 (H) 0.8 - 1.2   CBC auto differential    Collection Time: 04/06/19  4:21 AM   Result Value Ref Range    WBC 5.43 3.90 - 12.70 K/uL    RBC 2.55 (L) 4.60 - 6.20 M/uL    Hemoglobin 8.0 (L) 14.0 - 18.0 g/dL    Hematocrit 24.4 (L) 40.0 - 54.0 %    MCV 96 82 - 98 fL    MCH 31.4 (H) 27.0 - 31.0 pg    MCHC 32.8 32.0 - 36.0 g/dL    RDW 15.5 (H) 11.5 - 14.5 %    Platelets 141 (L) 150 - 350 K/uL    MPV 9.7 9.2 - 12.9 fL    Immature Granulocytes 0.7 (H) 0.0 - 0.5 %    Gran # (ANC) 3.2 1.8 - 7.7 K/uL    Immature Grans (Abs) 0.04 0.00 - 0.04  K/uL    Lymph # 1.5 1.0 - 4.8 K/uL    Mono # 0.6 0.3 - 1.0 K/uL    Eos # 0.1 0.0 - 0.5 K/uL    Baso # 0.01 0.00 - 0.20 K/uL    nRBC 0 0 /100 WBC    Gran% 58.2 38.0 - 73.0 %    Lymph% 28.2 18.0 - 48.0 %    Mono% 10.5 4.0 - 15.0 %    Eosinophil% 2.2 0.0 - 8.0 %    Basophil% 0.2 0.0 - 1.9 %    Differential Method Automated    Phosphorus    Collection Time: 04/06/19  4:21 AM   Result Value Ref Range    Phosphorus 2.8 2.7 - 4.5 mg/dL   POCT glucose    Collection Time: 04/06/19  8:55 AM   Result Value Ref Range    POCT Glucose 100 70 - 110 mg/dL   POCT glucose    Collection Time: 04/06/19 12:37 PM   Result Value Ref Range    POCT Glucose 168 (H) 70 - 110 mg/dL       Diagnostic Results     Brain Imaging   CTH non-contrast (3/28/2019):   Evolving subacute to chronic appearing infarcts in L thalamus and L PCA territory  small remote lacune in R putamen.  Cytotoxic cerebral edema    MRI Brain (3/26/19):  New acute left PCA territory distribution infarctions involving L thalamus and L paramedian occipital lobe, as well as L splenium of the corpus callosum   Generalized cerebral volume loss   significant chronic microvascular ischemic disease.      MRI Brain (3/24/19):  Vascular findings similar to CTH and CTA    Vessel Imaging   CTA-MP (3/24/19):  Extensive intracranial atherosclerotic disease:  Left PCA with a high-grade stenosis. High-grade (>70%) stenosis right proximal ICA and moderate (50-70%) stenosis left proximal ICA. Moderate to high-grade stenosis of the intra cavernous/ supraclinoid ICAs bilaterally. High-grade stenosis distal left vertebral artery.    Cardiac Imaging   TTE (3/23/19):  EF 55%, LVH  severe bilateral atrial enlargement   PA pressure  41   PFO with left to right shunting        Barb Arce PA-C  Comprehensive Stroke Center  Department of Vascular Neurology   [facility]

## 2019-04-06 NOTE — SUBJECTIVE & OBJECTIVE
Neurologic Chief Complaint: RSW + difficulty with speech    Subjective:     Interval History: Patient agitation improved with Seroquel and ramelteon.  INR increased to 2.8.  Coumadin decreased from 7.5mg to 5mg qd.  Will continue to monitor daily.  Edema in left arm improving, will continue doxycycline for cellulitis     HPI, Past Medical, Family, and Social History remains the same as documented in the initial encounter.     Review of Systems   Constitutional: Positive for fatigue. Negative for fever.   Eyes: Positive for visual disturbance.   Skin: Positive for wound.   Neurological: Positive for facial asymmetry, speech difficulty and weakness.   Psychiatric/Behavioral: Negative for agitation and sleep disturbance (improved).     Scheduled Meds:   allopurinol  100 mg Oral Daily    atorvastatin  80 mg Oral Daily    carvedilol  25 mg Oral BID WM    doxycycline  100 mg Oral Q12H    finasteride  5 mg Oral Daily    gabapentin  300 mg Oral QHS    insulin detemir U-100  8 Units Subcutaneous Daily    losartan  100 mg Oral QHS    pantoprazole  40 mg Oral Daily    polyethylene glycol  17 g Oral Daily    senna-docusate 8.6-50 mg  1 tablet Oral BID    tamsulosin  1 capsule Oral Daily    torsemide  20 mg Oral BID    warfarin  5 mg Oral Daily     Continuous Infusions:    PRN Meds:sodium chloride, sodium chloride 0.9%, acetaminophen, albuterol-ipratropium, dextrose 50%, dextrose 50%, glucagon (human recombinant), glucose, glucose, insulin aspart U-100, nitroGLYCERIN, ondansetron, QUEtiapine, ramelteon    Objective:     Vital Signs (Most Recent):  Temp: 96.7 °F (35.9 °C) (04/06/19 1242)  Pulse: 61 (04/06/19 1242)  Resp: 16 (04/06/19 1242)  BP: 128/61 (04/06/19 1242)  SpO2: 100 % (04/06/19 1242)  BP Location: Right arm    Vital Signs Range (Last 24H):  Temp:  [96.7 °F (35.9 °C)-99.4 °F (37.4 °C)]   Pulse:  [58-85]   Resp:  [16-18]   BP: (128-145)/(58-76)   SpO2:  [88 %-100 %]   BP Location: Right arm    Physical  Exam   Constitutional: He appears well-developed and well-nourished. No distress.   HENT:   Head: Normocephalic and atraumatic.   Cardiovascular: Normal rate.   Pulmonary/Chest: Effort normal. No accessory muscle usage. No respiratory distress.   Genitourinary:   Genitourinary Comments: Hematoma in the R groin   Skin: Skin is warm. He is not diaphoretic.   Nursing note and vitals reviewed.    Neurological Exam:   LOC: drowsy and agitated upon waking   Language and articulation:expressive (nods appropriately) and receptive  Visual Fields: no blink to threat right    Decreased sensation on right   Motor: right upper extremity 1/5 , RLE 2/5  Left side 4/5       Laboratory:  Recent Results (from the past 24 hour(s))   POCT glucose    Collection Time: 04/05/19  4:20 PM   Result Value Ref Range    POCT Glucose 147 (H) 70 - 110 mg/dL   Comprehensive metabolic panel    Collection Time: 04/06/19  4:21 AM   Result Value Ref Range    Sodium 137 136 - 145 mmol/L    Potassium 3.8 3.5 - 5.1 mmol/L    Chloride 105 95 - 110 mmol/L    CO2 21 (L) 23 - 29 mmol/L    Glucose 93 70 - 110 mg/dL    BUN, Bld 30 (H) 8 - 23 mg/dL    Creatinine 4.2 (H) 0.5 - 1.4 mg/dL    Calcium 7.8 (L) 8.7 - 10.5 mg/dL    Total Protein 5.4 (L) 6.0 - 8.4 g/dL    Albumin 2.6 (L) 3.5 - 5.2 g/dL    Total Bilirubin 1.1 (H) 0.1 - 1.0 mg/dL    Alkaline Phosphatase 99 55 - 135 U/L    AST 21 10 - 40 U/L    ALT 23 10 - 44 U/L    Anion Gap 11 8 - 16 mmol/L    eGFR if African American 14.5 (A) >60 mL/min/1.73 m^2    eGFR if non African American 12.6 (A) >60 mL/min/1.73 m^2   Protime-INR    Collection Time: 04/06/19  4:21 AM   Result Value Ref Range    Prothrombin Time 27.4 (H) 9.0 - 12.5 sec    INR 2.8 (H) 0.8 - 1.2   CBC auto differential    Collection Time: 04/06/19  4:21 AM   Result Value Ref Range    WBC 5.43 3.90 - 12.70 K/uL    RBC 2.55 (L) 4.60 - 6.20 M/uL    Hemoglobin 8.0 (L) 14.0 - 18.0 g/dL    Hematocrit 24.4 (L) 40.0 - 54.0 %    MCV 96 82 - 98 fL    MCH 31.4  (H) 27.0 - 31.0 pg    MCHC 32.8 32.0 - 36.0 g/dL    RDW 15.5 (H) 11.5 - 14.5 %    Platelets 141 (L) 150 - 350 K/uL    MPV 9.7 9.2 - 12.9 fL    Immature Granulocytes 0.7 (H) 0.0 - 0.5 %    Gran # (ANC) 3.2 1.8 - 7.7 K/uL    Immature Grans (Abs) 0.04 0.00 - 0.04 K/uL    Lymph # 1.5 1.0 - 4.8 K/uL    Mono # 0.6 0.3 - 1.0 K/uL    Eos # 0.1 0.0 - 0.5 K/uL    Baso # 0.01 0.00 - 0.20 K/uL    nRBC 0 0 /100 WBC    Gran% 58.2 38.0 - 73.0 %    Lymph% 28.2 18.0 - 48.0 %    Mono% 10.5 4.0 - 15.0 %    Eosinophil% 2.2 0.0 - 8.0 %    Basophil% 0.2 0.0 - 1.9 %    Differential Method Automated    Phosphorus    Collection Time: 04/06/19  4:21 AM   Result Value Ref Range    Phosphorus 2.8 2.7 - 4.5 mg/dL   POCT glucose    Collection Time: 04/06/19  8:55 AM   Result Value Ref Range    POCT Glucose 100 70 - 110 mg/dL   POCT glucose    Collection Time: 04/06/19 12:37 PM   Result Value Ref Range    POCT Glucose 168 (H) 70 - 110 mg/dL       Diagnostic Results     Brain Imaging   CTH non-contrast (3/28/2019):   Evolving subacute to chronic appearing infarcts in L thalamus and L PCA territory  small remote lacune in R putamen.  Cytotoxic cerebral edema    MRI Brain (3/26/19):  New acute left PCA territory distribution infarctions involving L thalamus and L paramedian occipital lobe, as well as L splenium of the corpus callosum   Generalized cerebral volume loss   significant chronic microvascular ischemic disease.      MRI Brain (3/24/19):  Vascular findings similar to CTH and CTA    Vessel Imaging   CTA-MP (3/24/19):  Extensive intracranial atherosclerotic disease:  Left PCA with a high-grade stenosis. High-grade (>70%) stenosis right proximal ICA and moderate (50-70%) stenosis left proximal ICA. Moderate to high-grade stenosis of the intra cavernous/ supraclinoid ICAs bilaterally. High-grade stenosis distal left vertebral artery.    Cardiac Imaging   TTE (3/23/19):  EF 55%, LVH  severe bilateral atrial enlargement   PA pressure  41   PFO  with left to right shunting

## 2019-04-06 NOTE — ASSESSMENT & PLAN NOTE
R forearm edema   Arterial line removed, evaluated by ortho for compartment syndrome which was negative as well as US  Tried vanc and clinda   De-escalated at this time and on [facility] - hospital medicine managing   signifcant improvement

## 2019-04-07 LAB
ALBUMIN SERPL BCP-MCNC: 2.6 G/DL (ref 3.5–5.2)
ALP SERPL-CCNC: 95 U/L (ref 55–135)
ALT SERPL W/O P-5'-P-CCNC: 21 U/L (ref 10–44)
ANION GAP SERPL CALC-SCNC: 11 MMOL/L (ref 8–16)
AST SERPL-CCNC: 19 U/L (ref 10–40)
BACTERIA BLD CULT: NORMAL
BACTERIA BLD CULT: NORMAL
BASOPHILS # BLD AUTO: 0.02 K/UL (ref 0–0.2)
BASOPHILS NFR BLD: 0.4 % (ref 0–1.9)
BILIRUB SERPL-MCNC: 0.9 MG/DL (ref 0.1–1)
BUN SERPL-MCNC: 42 MG/DL (ref 8–23)
CALCIUM SERPL-MCNC: 7.8 MG/DL (ref 8.7–10.5)
CHLORIDE SERPL-SCNC: 106 MMOL/L (ref 95–110)
CO2 SERPL-SCNC: 20 MMOL/L (ref 23–29)
CREAT SERPL-MCNC: 5.3 MG/DL (ref 0.5–1.4)
DIFFERENTIAL METHOD: ABNORMAL
EOSINOPHIL # BLD AUTO: 0.2 K/UL (ref 0–0.5)
EOSINOPHIL NFR BLD: 2.9 % (ref 0–8)
ERYTHROCYTE [DISTWIDTH] IN BLOOD BY AUTOMATED COUNT: 15.8 % (ref 11.5–14.5)
EST. GFR  (AFRICAN AMERICAN): 11 ML/MIN/1.73 M^2
EST. GFR  (NON AFRICAN AMERICAN): 9.5 ML/MIN/1.73 M^2
GLUCOSE SERPL-MCNC: 90 MG/DL (ref 70–110)
HCT VFR BLD AUTO: 26.5 % (ref 40–54)
HGB BLD-MCNC: 8.5 G/DL (ref 14–18)
IMM GRANULOCYTES # BLD AUTO: 0.05 K/UL (ref 0–0.04)
IMM GRANULOCYTES NFR BLD AUTO: 1 % (ref 0–0.5)
INR PPP: 2.6 (ref 0.8–1.2)
LYMPHOCYTES # BLD AUTO: 1.8 K/UL (ref 1–4.8)
LYMPHOCYTES NFR BLD: 35.4 % (ref 18–48)
MCH RBC QN AUTO: 32.2 PG (ref 27–31)
MCHC RBC AUTO-ENTMCNC: 32.1 G/DL (ref 32–36)
MCV RBC AUTO: 100 FL (ref 82–98)
MONOCYTES # BLD AUTO: 0.5 K/UL (ref 0.3–1)
MONOCYTES NFR BLD: 10.1 % (ref 4–15)
NEUTROPHILS # BLD AUTO: 2.6 K/UL (ref 1.8–7.7)
NEUTROPHILS NFR BLD: 50.2 % (ref 38–73)
NRBC BLD-RTO: 0 /100 WBC
PHOSPHATE SERPL-MCNC: 3.6 MG/DL (ref 2.7–4.5)
PLATELET # BLD AUTO: 148 K/UL (ref 150–350)
PMV BLD AUTO: 9.4 FL (ref 9.2–12.9)
POCT GLUCOSE: 130 MG/DL (ref 70–110)
POCT GLUCOSE: 151 MG/DL (ref 70–110)
POTASSIUM SERPL-SCNC: 3.9 MMOL/L (ref 3.5–5.1)
PROT SERPL-MCNC: 5.3 G/DL (ref 6–8.4)
PROTHROMBIN TIME: 25.2 SEC (ref 9–12.5)
RBC # BLD AUTO: 2.64 M/UL (ref 4.6–6.2)
SODIUM SERPL-SCNC: 137 MMOL/L (ref 136–145)
WBC # BLD AUTO: 5.14 K/UL (ref 3.9–12.7)

## 2019-04-07 PROCEDURE — 25000003 PHARM REV CODE 250: Performed by: STUDENT IN AN ORGANIZED HEALTH CARE EDUCATION/TRAINING PROGRAM

## 2019-04-07 PROCEDURE — 25000003 PHARM REV CODE 250: Performed by: PHYSICIAN ASSISTANT

## 2019-04-07 PROCEDURE — 80053 COMPREHEN METABOLIC PANEL: CPT

## 2019-04-07 PROCEDURE — 99900035 HC TECH TIME PER 15 MIN (STAT)

## 2019-04-07 PROCEDURE — 25000003 PHARM REV CODE 250: Performed by: INTERNAL MEDICINE

## 2019-04-07 PROCEDURE — 25000003 PHARM REV CODE 250: Performed by: NURSE PRACTITIONER

## 2019-04-07 PROCEDURE — 84100 ASSAY OF PHOSPHORUS: CPT

## 2019-04-07 PROCEDURE — 94660 CPAP INITIATION&MGMT: CPT

## 2019-04-07 PROCEDURE — 36415 COLL VENOUS BLD VENIPUNCTURE: CPT

## 2019-04-07 PROCEDURE — 85610 PROTHROMBIN TIME: CPT

## 2019-04-07 PROCEDURE — 99233 PR SUBSEQUENT HOSPITAL CARE,LEVL III: ICD-10-PCS | Mod: GC,,, | Performed by: PSYCHIATRY & NEUROLOGY

## 2019-04-07 PROCEDURE — 25000003 PHARM REV CODE 250: Performed by: HOSPITALIST

## 2019-04-07 PROCEDURE — 85025 COMPLETE CBC W/AUTO DIFF WBC: CPT

## 2019-04-07 PROCEDURE — 97112 NEUROMUSCULAR REEDUCATION: CPT

## 2019-04-07 PROCEDURE — 20600001 HC STEP DOWN PRIVATE ROOM

## 2019-04-07 PROCEDURE — 99233 SBSQ HOSP IP/OBS HIGH 50: CPT | Mod: GC,,, | Performed by: PSYCHIATRY & NEUROLOGY

## 2019-04-07 PROCEDURE — 97530 THERAPEUTIC ACTIVITIES: CPT

## 2019-04-07 RX ORDER — WARFARIN SODIUM 5 MG/1
5 TABLET ORAL
Status: DISCONTINUED | OUTPATIENT
Start: 2019-04-12 | End: 2019-04-09 | Stop reason: HOSPADM

## 2019-04-07 RX ORDER — SODIUM CHLORIDE 9 MG/ML
INJECTION, SOLUTION INTRAVENOUS
Status: DISCONTINUED | OUTPATIENT
Start: 2019-04-08 | End: 2019-04-09 | Stop reason: HOSPADM

## 2019-04-07 RX ORDER — SODIUM CHLORIDE 9 MG/ML
INJECTION, SOLUTION INTRAVENOUS ONCE
Status: COMPLETED | OUTPATIENT
Start: 2019-04-08 | End: 2019-04-08

## 2019-04-07 RX ORDER — WARFARIN SODIUM 5 MG/1
5 TABLET ORAL
Status: DISCONTINUED | OUTPATIENT
Start: 2019-04-14 | End: 2019-04-09 | Stop reason: HOSPADM

## 2019-04-07 RX ORDER — WARFARIN SODIUM 5 MG/1
5 TABLET ORAL
Status: DISCONTINUED | OUTPATIENT
Start: 2019-04-10 | End: 2019-04-09 | Stop reason: HOSPADM

## 2019-04-07 RX ADMIN — LOSARTAN POTASSIUM 100 MG: 50 TABLET, FILM COATED ORAL at 08:04

## 2019-04-07 RX ADMIN — ALLOPURINOL 100 MG: 100 TABLET ORAL at 09:04

## 2019-04-07 RX ADMIN — GABAPENTIN 300 MG: 300 CAPSULE ORAL at 08:04

## 2019-04-07 RX ADMIN — PANTOPRAZOLE SODIUM 40 MG: 40 TABLET, DELAYED RELEASE ORAL at 09:04

## 2019-04-07 RX ADMIN — DOXYCYCLINE HYCLATE 100 MG: 100 TABLET, COATED ORAL at 09:04

## 2019-04-07 RX ADMIN — ATORVASTATIN CALCIUM 80 MG: 20 TABLET, FILM COATED ORAL at 08:04

## 2019-04-07 RX ADMIN — CARVEDILOL 25 MG: 25 TABLET, FILM COATED ORAL at 09:04

## 2019-04-07 RX ADMIN — INSULIN DETEMIR 8 UNITS: 100 INJECTION, SOLUTION SUBCUTANEOUS at 09:04

## 2019-04-07 RX ADMIN — STANDARDIZED SENNA CONCENTRATE AND DOCUSATE SODIUM 1 TABLET: 8.6; 5 TABLET, FILM COATED ORAL at 09:04

## 2019-04-07 RX ADMIN — TORSEMIDE 20 MG: 20 TABLET ORAL at 08:04

## 2019-04-07 RX ADMIN — FINASTERIDE 5 MG: 5 TABLET, FILM COATED ORAL at 09:04

## 2019-04-07 RX ADMIN — DOXYCYCLINE HYCLATE 100 MG: 100 TABLET, COATED ORAL at 08:04

## 2019-04-07 RX ADMIN — TORSEMIDE 20 MG: 20 TABLET ORAL at 09:04

## 2019-04-07 RX ADMIN — QUETIAPINE FUMARATE 25 MG: 25 TABLET ORAL at 08:04

## 2019-04-07 RX ADMIN — TAMSULOSIN HYDROCHLORIDE 0.4 MG: 0.4 CAPSULE ORAL at 09:04

## 2019-04-07 RX ADMIN — RAMELTEON 8 MG: 8 TABLET, FILM COATED ORAL at 08:04

## 2019-04-07 NOTE — ASSESSMENT & PLAN NOTE
R forearm edema   Arterial line removed, evaluated by ortho for compartment syndrome which was negative as well as US  Tried vanc and clinda   De-escalated at this time and on doxy last dose will be 04/09  Improved

## 2019-04-07 NOTE — PROGRESS NOTES
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  He has significant intracranial/extrancranial atherosclerotic disease seen on CTA head/neck. Etiology cardioembolic versus atheroembolic.      - Anticoagulants: Coumadin daily adjusting for INR goal of 2.0-3.0   - Statins: Atorvastatin- 80 mg daily  -Diagnostic studies pending: INR   - Aggressive risk factor modification: HTN, DM, HLD, A-Fib, CAD, intracranial atherosclerosis   - Rehab efforts: PT/OT to evaluate and treat ->recommending Rehab, SLP -> recommending Regular diet/nectar thick  - VTE prophylaxis: anticoagulated  - BP parameters: target sBP<160      Cellulitis of right arm  R forearm edema   Arterial line removed, evaluated by ortho for compartment syndrome which was negative as well as US  Tried vanc and clinda   De-escalated at this time and on doxy last dose will be 04/09  Improved     Umbilical hernia  + abdominal pain during hospitalization  CT scan ordered showed umbilical hernia  General surgery consulted and recommended outpatient follow up     Cytotoxic cerebral edema  Large area of cytotoxic cerebral edema identified when reviewing brain imaging in the territory of the left posterior cerebral artery. There is mass effect associated with it. We will continue to monitor the patients clinical exam for any worsening of symptoms which may indicate expansion of the stroke or the area of the edema resulting in the clinical change. The pattern is suggestive of atheroembolic etiology.        Hemoglobin drop  Within the last 3 days from 8.2 >7.9 >7.1    - Discontinued heparin gtt, now coumadin   - will transfuse with pRBC x1 (consent obtained from daughter Tonya Hunt via phone)  - continue to monitor      Groin hematoma  Monitoring daily,continue to worsen  Stat right femoral arterial ultrasound ordered and negative for vasculature abnormalities   Soft - continue to monitor     Dysarthria due to acute cerebrovascular accident (CVA)  Continued SLP  - recommend renal diet, cardiac and diabetic with nectar thick   When awake     Patent foramen ovale  Risk factor for stroke  Seen on echocardiogram     Paroxysmal atrial fibrillation  Risk factor for stroke   CHADsVASc 7    - Carvedilol for rate control   -coumadin daily     ESRD (end stage renal disease) on dialysis  Patient is being followed by nephrology, appreciate their assistance  Continue scheduled HD, MWF    Dialysis today     NSTEMI (non-ST elevated myocardial infarction)  Seen by cards on 3/25/19   Continue medical management   Does not complain of chest pain at this time     Type 2 diabetes mellitus, without long-term current use of insulin  Poorly controlled, A1C 8.3  Current glucose running ~100-150  Detemir 8 units daily  MD SSI  POCt glucose q6 hr    Long term (current) use of anticoagulants  - coumadin daily  INR 2.5 today     Benign prostatic hyperplasia without lower urinary tract symptoms  H/o and CT showed enlarged prostate   - continue flomax and finasteride  -has been making urine and several episodes of incontinence per sister in law but has not urinated since yesterday  -u/a negative, bladder scan   - hematuria with clots - urology consulted , condom cath ordered, resolved   -q 6 hour bladder scans     Anemia of chronic disease  Due to ESRD   receives EPO every 2 weeks   H/h continues to trend down.  Blood transfusion 03/31 1unit  Stable currently   Hematuria resolved       Obstructive sleep apnea  Sleep study in 2014  CONCLUSION:  Nocturnal polysomnography demonstrates:  1.  Obstructive sleep apnea to be present with 44.2 events an hour with   desaturation to 81%.  2.  Sinus rhythm with occasional PVC.    Apparently lost to follow up- per  daughter, reported that no one ever called to tell him report   Snoring in the hospital overnight   Plan to try cpap in hospital -can take off if unable to tolerate   Stroke risk factor     Gout, arthritis  - continue allopurinol    CAD (coronary artery disease), 2V CABG 2007  Risk factor for stroke  Continue statin.       Hyperlipidemia  LDL 46  Continue atorvastatin 80 mg due to extensive intracranial atherosclerotic disease      Essential hypertension  Risk factor for stroke   SBP<160  continue Torsemide 20 mg q12, losartan 100 mg qd, and Carvedilol 25 mg q12        Carotid artery stenosis  Noted CTA and carotid US  Patient evaluated by vascular surgery, recommended maximal medical management    Hematuria  Complicated by low platelets, need for anticoagulation  Urology consulted   q6 hour bladder scans for retention   Condom cath ordered - planning to monitor at this point.   Resolved          Admitted to hospital medicine for unstable angina eval, vascular neurology consulted for right sided weakness, facial droop and dysarthria, MRI showed L PCA infarction, not candidate for TPA ( symptoms duration >4 hours)patient symptoms improved with laying flat, admitted to neuro critical care. Patient with known PCA infarct on L with undulating symptom pattern with modification to level of recumbency. Patient restarted on heparin gtt. Underwent US of LE and of carotid with evidence of <50% L ICA stenosis and 60-70% R ICA stenosis, heavily calcified on CTA, additionally L vertebral also heavily calcified. Patient tolerated HD. Neuro deficit appear to be more severe on 3/27 with decreasing right arm strength now 2/5 and with worsening dysarthria/aphasia. Patient with gross swelling of R forearm - tense, pulses intact.   03/29/2019 patient with worsening of neurological deficit, unable to move right upper extremity, patient with pain on passive movement of right fingers exhibited by facial grimace as verbal response to  "pain/sensation/light touch no longer reliable, aphasia/dysarthria worsening, RLE with 2/5 strength today, patient evaluated by orthopedic surgery regarding R forearm yesterday and today and feel that this is not compartment syndrome and will continue to monitor, distal pulses remain palpable  3/30: NAEON. On heparin gtt. Plans to step down  3/31: stepped down to VN without major events. transfused with pRBC x1 due to drop in Hbg. Discontinued heparin gtt. INR increased to 1.2, increased warfarin to 7.5  04/01/19 Visited patient in dialysis today.  On coumadin and atorvastatin for secondary stroke prevention H/H stabilized at 7.6 after 1 unit pRBC transfusion on 03/31.  Epo given during dialysis.  Patient has worsening right groin hematoma.  Stat right femoral artery u/s ordered.  On clindamycin for suspected right arm cellulitis.  Sister-in-law concern for urination.  Patient was incontinent and making urine but today has not had any urination.  U/A and bladder scan ordered   04/02/19 hospital medicine consulted for multiple medical issues. Right arm edema worsening.  Switching antibiotics from oral to IV     4/3/19 - drowsy today, less interactive. Plan for dialysis today. Noted to have gross hematuria with clots , bladder scan 315.   4/4/19 - will place condom catheter for hematuria monitoring, H&H stable. Hospital medicine helping with cellulitis. cpap ordered for night  4/5/19 - dialysis today, agitated overnight and throughout the day. Appears angry telling his family today "leave me alone, go home". Will plan for telesitter, pharmacologic therapy ordered prn tonight, with plans to involve psych if not able to control as at this time its causing him to be less interactive with therapy   04/06 Patient agitation improved with Seroquel and ramelteon.  INR increased to 2.8.  Coumadin decreased from 7.5mg to 5mg qd.  Will continue to monitor daily.  Edema in left arm improving, will continue doxycycline for " cellulitis   04/07/19 Right arm edema and redness improving.  Last dose of doxycycline will be 04/09.  INR 2.5.  Patient stable for discharge     STROKE DOCUMENTATION        NIH Scale:  1a. Level of Consciousness: 0-->Alert, keenly responsive  1b. LOC Questions: 2-->Answers neither question correctly  1c. LOC Commands: 1-->Performs one task correctly  2. Best Gaze: 0-->Normal  3. Visual: 2-->Complete hemianopia  4. Facial Palsy: 0-->Normal symmetrical movements  5a. Motor Arm, Left: 1-->Drift, limb holds 90 (or 45) degrees, but drifts down before full 10 seconds, does not hit bed or other support  5b. Motor Arm, Right: 3-->No effort against gravity, limb falls  6a. Motor Leg, Left: 1-->Drift, leg falls by the end of the 5-sec period but does not hit bed  6b. Motor Leg, Right: 3-->No effort against gravity, leg falls to bed immediately  7. Limb Ataxia: 0-->Absent  8. Sensory: 1-->Mild-to-moderate sensory loss, patient feels pinprick is less sharp or is dull on the affected side, or there is a loss of superficial pain with pinprick, but patient is aware of being touched  9. Best Language: 2-->Severe aphasia, all communication is through fragmentary expression, great need for inference, questioning, and guessing by the listener. Range of information that can be exchanged is limited, listener carries burden of. . . (see row details)  10. Dysarthria: 0-->Normal  11. Extinction and Inattention (formerly Neglect): 0-->No abnormality  Total (NIH Stroke Scale): 16       Modified Steve    Sachin Coma Scale:    ABCD2 Score:    [id number] Score:8  HAS -BLED Score:   ICH Score:   Hunt & Sharp Classification:      Hemorrhagic change of an Ischemic Stroke: Does this patient have an ischemic stroke with hemorrhagic changes? No     Neurologic Chief Complaint: RSW + difficulty with speech    Subjective:     Interval History: Right arm edema and redness improving.  Last dose of doxycycline will be 04/09.  INR 2.5.  Patient stable  for discharge     HPI, Past Medical, Family, and Social History remains the same as documented in the initial encounter.     Review of Systems   Constitutional: Positive for fatigue. Negative for fever.   Eyes: Positive for visual disturbance.   Neurological: Positive for facial asymmetry, speech difficulty and weakness.     Scheduled Meds:   allopurinol  100 mg Oral Daily    atorvastatin  80 mg Oral Daily    carvedilol  25 mg Oral BID WM    doxycycline  100 mg Oral Q12H    finasteride  5 mg Oral Daily    gabapentin  300 mg Oral QHS    insulin detemir U-100  8 Units Subcutaneous Daily    losartan  100 mg Oral QHS    pantoprazole  40 mg Oral Daily    polyethylene glycol  17 g Oral Daily    senna-docusate 8.6-50 mg  1 tablet Oral BID    tamsulosin  1 capsule Oral Daily    torsemide  20 mg Oral BID    warfarin  5 mg Oral Daily     Continuous Infusions:    PRN Meds:sodium chloride, sodium chloride 0.9%, acetaminophen, albuterol-ipratropium, dextrose 50%, dextrose 50%, glucagon (human recombinant), glucose, glucose, insulin aspart U-100, nitroGLYCERIN, ondansetron, QUEtiapine, ramelteon    Objective:     Vital Signs (Most Recent):  Temp: 97.6 °F (36.4 °C) (04/07/19 0717)  Pulse: 77 (04/07/19 0717)  Resp: 18 (04/07/19 0717)  BP: 139/68 (04/07/19 0717)  SpO2: 95 % (04/07/19 0717)  BP Location: Right leg    Vital Signs Range (Last 24H):  Temp:  [96.7 °F (35.9 °C)-99.1 °F (37.3 °C)]   Pulse:  [58-77]   Resp:  [16-18]   BP: (122-140)/(53-68)   SpO2:  [93 %-100 %]   BP Location: Right leg    Physical Exam   Constitutional: He appears well-developed and well-nourished. No distress.   HENT:   Head: Normocephalic and atraumatic.   Cardiovascular: Normal rate.   Pulmonary/Chest: Effort normal. No accessory muscle usage. No respiratory distress.   Genitourinary:   Genitourinary Comments: Hematoma in the R groin   Skin: Skin is warm. He is not diaphoretic.   Nursing note and vitals reviewed.    Neurological Exam:    LOC: drowsy and agitated upon waking   Language and articulation:expressive (nods appropriately) and receptive  Visual Fields: no blink to threat right    Decreased sensation on right   Motor: right upper extremity 1/5 , RLE 2/5  Left side 4/5       Laboratory:  Recent Results (from the past 24 hour(s))   POCT glucose    Collection Time: 04/06/19 12:37 PM   Result Value Ref Range    POCT Glucose 168 (H) 70 - 110 mg/dL   POCT glucose    Collection Time: 04/06/19  5:50 PM   Result Value Ref Range    POCT Glucose 142 (H) 70 - 110 mg/dL   Comprehensive metabolic panel    Collection Time: 04/07/19  5:21 AM   Result Value Ref Range    Sodium 137 136 - 145 mmol/L    Potassium 3.9 3.5 - 5.1 mmol/L    Chloride 106 95 - 110 mmol/L    CO2 20 (L) 23 - 29 mmol/L    Glucose 90 70 - 110 mg/dL    BUN, Bld 42 (H) 8 - 23 mg/dL    Creatinine 5.3 (H) 0.5 - 1.4 mg/dL    Calcium 7.8 (L) 8.7 - 10.5 mg/dL    Total Protein 5.3 (L) 6.0 - 8.4 g/dL    Albumin 2.6 (L) 3.5 - 5.2 g/dL    Total Bilirubin 0.9 0.1 - 1.0 mg/dL    Alkaline Phosphatase 95 55 - 135 U/L    AST 19 10 - 40 U/L    ALT 21 10 - 44 U/L    Anion Gap 11 8 - 16 mmol/L    eGFR if African American 11.0 (A) >60 mL/min/1.73 m^2    eGFR if non African American 9.5 (A) >60 mL/min/1.73 m^2   Protime-INR    Collection Time: 04/07/19  5:21 AM   Result Value Ref Range    Prothrombin Time 25.2 (H) 9.0 - 12.5 sec    INR 2.6 (H) 0.8 - 1.2   CBC auto differential    Collection Time: 04/07/19  5:21 AM   Result Value Ref Range    WBC 5.14 3.90 - 12.70 K/uL    RBC 2.64 (L) 4.60 - 6.20 M/uL    Hemoglobin 8.5 (L) 14.0 - 18.0 g/dL    Hematocrit 26.5 (L) 40.0 - 54.0 %     (H) 82 - 98 fL    MCH 32.2 (H) 27.0 - 31.0 pg    MCHC 32.1 32.0 - 36.0 g/dL    RDW 15.8 (H) 11.5 - 14.5 %    Platelets 148 (L) 150 - 350 K/uL    MPV 9.4 9.2 - 12.9 fL    Immature Granulocytes 1.0 (H) 0.0 - 0.5 %    Gran # (ANC) 2.6 1.8 - 7.7 K/uL    Immature Grans (Abs) 0.05 (H) 0.00 - 0.04 K/uL    Lymph # 1.8 1.0 - 4.8  K/uL    Mono # 0.5 0.3 - 1.0 K/uL    Eos # 0.2 0.0 - 0.5 K/uL    Baso # 0.02 0.00 - 0.20 K/uL    nRBC 0 0 /100 WBC    Gran% 50.2 38.0 - 73.0 %    Lymph% 35.4 18.0 - 48.0 %    Mono% 10.1 4.0 - 15.0 %    Eosinophil% 2.9 0.0 - 8.0 %    Basophil% 0.4 0.0 - 1.9 %    Differential Method Automated    Phosphorus    Collection Time: 04/07/19  5:21 AM   Result Value Ref Range    Phosphorus 3.6 2.7 - 4.5 mg/dL       Diagnostic Results     Brain Imaging   CTH non-contrast (3/28/2019):   Evolving subacute to chronic appearing infarcts in L thalamus and L PCA territory  small remote lacune in R putamen.  Cytotoxic cerebral edema    MRI Brain (3/26/19):  New acute left PCA territory distribution infarctions involving L thalamus and L paramedian occipital lobe, as well as L splenium of the corpus callosum   Generalized cerebral volume loss   significant chronic microvascular ischemic disease.      MRI Brain (3/24/19):  Vascular findings similar to CTH and CTA    Vessel Imaging   CTA-MP (3/24/19):  Extensive intracranial atherosclerotic disease:  Left PCA with a high-grade stenosis. High-grade (>70%) stenosis right proximal ICA and moderate (50-70%) stenosis left proximal ICA. Moderate to high-grade stenosis of the intra cavernous/ supraclinoid ICAs bilaterally. High-grade stenosis distal left vertebral artery.    Cardiac Imaging   TTE (3/23/19):  EF 55%, LVH  severe bilateral atrial enlargement   PA pressure  41   PFO with left to right shunting        Barb Arce PA-C  Comprehensive Stroke Center  Department of Vascular Neurology   [facility]

## 2019-04-07 NOTE — PT/OT/SLP PROGRESS
Physical Therapy Treatment    Patient Name:  Micheal Hunt   MRN:  [id number]    Recommendations:     Discharge Recommendations:  rehabilitation facility   Discharge Equipment Recommendations: (TBD)   Barriers to discharge: Inaccessible home and Decreased caregiver support    Assessment:     Micheal Hunt is a 78 y.o. 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  He presents with the following impairments/functional limitations:  weakness, impaired endurance, impaired self care skills, impaired functional mobilty, gait instability, impaired balance, impaired sensation, impaired cognition, decreased coordination, decreased upper extremity function, decreased lower extremity function, decreased safety awareness, abnormal tone, impaired coordination, impaired fine motor, impaired cardiopulmonary response to activity, edema. Pt tolerated session fairly with focus on bed mobility and transfers. Pt progressing well with pt tolerating sitting EOB and 2 attempts to stand, reaching full stand with Max A this day. Pt will continue to benefit from therapy services to improve impairments listed above.     Rehab Prognosis: Good; patient would benefit from acute skilled PT services to address these deficits and reach maximum level of function.    Recent Surgery: Procedure(s) (LRB):  Left heart cath (Left)      Plan:     During this hospitalization, patient to be seen 4 x/week to address the identified rehab impairments via gait training, therapeutic activities, therapeutic exercises, neuromuscular re-education and progress toward the following goals:    · Plan of Care Expires:  04/29/19    Subjective     Chief Complaint: no c/o  Patient/Family Comments/goals: pt aphasic, with difficulty answering basic questions, able to answer yes/no questions  Pain/Comfort:  · Pain Rating 1: 0/10  · Pain Rating Post-Intervention 1: 0/10      Objective:     Communicated with  NSG prior to session.  Patient found supine with bed alarm, telemetry, SCD ,and NSG and daughter present upon PTA entry to room.     General Precautions: Standard, aphasia, fall, nectar thick   Orthopedic Precautions:N/A   Braces: N/A     Functional Mobility:  · Bed Mobility:     · Supine to Sit: maximal assistance  · Sit to Supine: maximal assistance  · Transfers:     · Sit to Stand:  maximal assistance with no AD  · Balance: Pt sits with SBA to Mod A for static balance. Pt tendency for R lateral lean.       AM-PAC 6 CLICK MOBILITY  Turning over in bed (including adjusting bedclothes, sheets and blankets)?: 2  Sitting down on and standing up from a chair with arms (e.g., wheelchair, bedside commode, etc.): 1  Moving from lying on back to sitting on the side of the bed?: 2  Moving to and from a bed to a chair (including a wheelchair)?: 1  Need to walk in hospital room?: 1  Climbing 3-5 steps with a railing?: 1  Basic Mobility Total Score: 8       Therapeutic Activities and Exercises:  Pt assisted with functional mobility as noted above.   Pt provided with cues and support to facilitate RUE WB, attention to R side with scanning tasks, and short static reaching tasks with LUE.   Pt attempts standing x 2 trials with no AD and Max A. Pt requires assistance for elevation and blocking of RLE to facilitate stable upright standing. Pt limited with standing endurance, standing for ~ 20-30 seconds each stand.   Pt assisted with lateral scooting to R side with Max A to elevate and direct scooting.   Pt and daughter educated on PT POC, safety, and pts assistance needed.     Patient left HOB elevated with all lines intact, call button in reach, bed alarm on, daughter present and pt in view of avasys camera per phone check with technician..    GOALS:   Multidisciplinary Problems     Physical Therapy Goals        Problem: Physical Therapy Goal    Goal Priority Disciplines Outcome Goal Variances Interventions   Physical Therapy  Goal   (Resolved)     PT, PT/OT Outcome(s) achieved     Description:  Pt met goals at eval to demonstrate safe level of mobility for d/c home.  Pt amb community distance with (S) only.           Problem: Physical Therapy Goal    Goal Priority Disciplines Outcome Goal Variances Interventions   Physical Therapy Goal     PT, PT/OT Ongoing (interventions implemented as appropriate)     Description:  Goals to be met by: 2019    Patient will increase functional independence with mobility by performin. Supine to sit with moderate assistance  2. Sit to supine with moderate Assistance  3. Sit to stand transfer with Maximum Assistance  4. Gait  x 2 feet with Maximum Assistance using least restrictive device  5. Sitting at edge of bed x15 minutes with Contact Guard Assistance  6. Lower extremity exercise program x20 reps per handout, with assistance as needed                      Time Tracking:     PT Received On: 19  PT Start Time: 831     PT Stop Time: 909  PT Total Time (min): 38 min     Billable Minutes: Therapeutic Activity 28 and Neuromuscular Re-education 10    Treatment Type: Treatment  PT/PTA: PTA     PTA Visit Number: 1     Rayray Tai PTA  2019

## 2019-04-07 NOTE — ASSESSMENT & PLAN NOTE
Monitoring daily,continue to worsen  Stat right femoral arterial ultrasound ordered and negative for vasculature abnormalities   Soft - continue to monitor

## 2019-04-07 NOTE — SUBJECTIVE & OBJECTIVE
Neurologic Chief Complaint: RSW + difficulty with speech    Subjective:     Interval History: Right arm edema and redness improving.  Last dose of doxycycline will be 04/09.  INR 2.5.  Patient stable for discharge     HPI, Past Medical, Family, and Social History remains the same as documented in the initial encounter.     Review of Systems   Constitutional: Positive for fatigue. Negative for fever.   Eyes: Positive for visual disturbance.   Neurological: Positive for facial asymmetry, speech difficulty and weakness.     Scheduled Meds:   allopurinol  100 mg Oral Daily    atorvastatin  80 mg Oral Daily    carvedilol  25 mg Oral BID WM    doxycycline  100 mg Oral Q12H    finasteride  5 mg Oral Daily    gabapentin  300 mg Oral QHS    insulin detemir U-100  8 Units Subcutaneous Daily    losartan  100 mg Oral QHS    pantoprazole  40 mg Oral Daily    polyethylene glycol  17 g Oral Daily    senna-docusate 8.6-50 mg  1 tablet Oral BID    tamsulosin  1 capsule Oral Daily    torsemide  20 mg Oral BID    warfarin  5 mg Oral Daily     Continuous Infusions:    PRN Meds:sodium chloride, sodium chloride 0.9%, acetaminophen, albuterol-ipratropium, dextrose 50%, dextrose 50%, glucagon (human recombinant), glucose, glucose, insulin aspart U-100, nitroGLYCERIN, ondansetron, QUEtiapine, ramelteon    Objective:     Vital Signs (Most Recent):  Temp: 97.6 °F (36.4 °C) (04/07/19 0717)  Pulse: 77 (04/07/19 0717)  Resp: 18 (04/07/19 0717)  BP: 139/68 (04/07/19 0717)  SpO2: 95 % (04/07/19 0717)  BP Location: Right leg    Vital Signs Range (Last 24H):  Temp:  [96.7 °F (35.9 °C)-99.1 °F (37.3 °C)]   Pulse:  [58-77]   Resp:  [16-18]   BP: (122-140)/(53-68)   SpO2:  [93 %-100 %]   BP Location: Right leg    Physical Exam   Constitutional: He appears well-developed and well-nourished. No distress.   HENT:   Head: Normocephalic and atraumatic.   Cardiovascular: Normal rate.   Pulmonary/Chest: Effort normal. No accessory muscle usage.  No respiratory distress.   Genitourinary:   Genitourinary Comments: Hematoma in the R groin   Skin: Skin is warm. He is not diaphoretic.   Nursing note and vitals reviewed.    Neurological Exam:   LOC: drowsy and agitated upon waking   Language and articulation:expressive (nods appropriately) and receptive  Visual Fields: no blink to threat right    Decreased sensation on right   Motor: right upper extremity 1/5 , RLE 2/5  Left side 4/5       Laboratory:  Recent Results (from the past 24 hour(s))   POCT glucose    Collection Time: 04/06/19 12:37 PM   Result Value Ref Range    POCT Glucose 168 (H) 70 - 110 mg/dL   POCT glucose    Collection Time: 04/06/19  5:50 PM   Result Value Ref Range    POCT Glucose 142 (H) 70 - 110 mg/dL   Comprehensive metabolic panel    Collection Time: 04/07/19  5:21 AM   Result Value Ref Range    Sodium 137 136 - 145 mmol/L    Potassium 3.9 3.5 - 5.1 mmol/L    Chloride 106 95 - 110 mmol/L    CO2 20 (L) 23 - 29 mmol/L    Glucose 90 70 - 110 mg/dL    BUN, Bld 42 (H) 8 - 23 mg/dL    Creatinine 5.3 (H) 0.5 - 1.4 mg/dL    Calcium 7.8 (L) 8.7 - 10.5 mg/dL    Total Protein 5.3 (L) 6.0 - 8.4 g/dL    Albumin 2.6 (L) 3.5 - 5.2 g/dL    Total Bilirubin 0.9 0.1 - 1.0 mg/dL    Alkaline Phosphatase 95 55 - 135 U/L    AST 19 10 - 40 U/L    ALT 21 10 - 44 U/L    Anion Gap 11 8 - 16 mmol/L    eGFR if African American 11.0 (A) >60 mL/min/1.73 m^2    eGFR if non African American 9.5 (A) >60 mL/min/1.73 m^2   Protime-INR    Collection Time: 04/07/19  5:21 AM   Result Value Ref Range    Prothrombin Time 25.2 (H) 9.0 - 12.5 sec    INR 2.6 (H) 0.8 - 1.2   CBC auto differential    Collection Time: 04/07/19  5:21 AM   Result Value Ref Range    WBC 5.14 3.90 - 12.70 K/uL    RBC 2.64 (L) 4.60 - 6.20 M/uL    Hemoglobin 8.5 (L) 14.0 - 18.0 g/dL    Hematocrit 26.5 (L) 40.0 - 54.0 %     (H) 82 - 98 fL    MCH 32.2 (H) 27.0 - 31.0 pg    MCHC 32.1 32.0 - 36.0 g/dL    RDW 15.8 (H) 11.5 - 14.5 %    Platelets 148 (L) 150  - 350 K/uL    MPV 9.4 9.2 - 12.9 fL    Immature Granulocytes 1.0 (H) 0.0 - 0.5 %    Gran # (ANC) 2.6 1.8 - 7.7 K/uL    Immature Grans (Abs) 0.05 (H) 0.00 - 0.04 K/uL    Lymph # 1.8 1.0 - 4.8 K/uL    Mono # 0.5 0.3 - 1.0 K/uL    Eos # 0.2 0.0 - 0.5 K/uL    Baso # 0.02 0.00 - 0.20 K/uL    nRBC 0 0 /100 WBC    Gran% 50.2 38.0 - 73.0 %    Lymph% 35.4 18.0 - 48.0 %    Mono% 10.1 4.0 - 15.0 %    Eosinophil% 2.9 0.0 - 8.0 %    Basophil% 0.4 0.0 - 1.9 %    Differential Method Automated    Phosphorus    Collection Time: 04/07/19  5:21 AM   Result Value Ref Range    Phosphorus 3.6 2.7 - 4.5 mg/dL       Diagnostic Results     Brain Imaging   CTH non-contrast (3/28/2019):   Evolving subacute to chronic appearing infarcts in L thalamus and L PCA territory  small remote lacune in R putamen.  Cytotoxic cerebral edema    MRI Brain (3/26/19):  New acute left PCA territory distribution infarctions involving L thalamus and L paramedian occipital lobe, as well as L splenium of the corpus callosum   Generalized cerebral volume loss   significant chronic microvascular ischemic disease.      MRI Brain (3/24/19):  Vascular findings similar to CTH and CTA    Vessel Imaging   CTA-MP (3/24/19):  Extensive intracranial atherosclerotic disease:  Left PCA with a high-grade stenosis. High-grade (>70%) stenosis right proximal ICA and moderate (50-70%) stenosis left proximal ICA. Moderate to high-grade stenosis of the intra cavernous/ supraclinoid ICAs bilaterally. High-grade stenosis distal left vertebral artery.    Cardiac Imaging   TTE (3/23/19):  EF 55%, LVH  severe bilateral atrial enlargement   PA pressure  41   PFO with left to right shunting

## 2019-04-07 NOTE — PLAN OF CARE
Problem: Physical Therapy Goal  Goal: Physical Therapy Goal  Goals to be met by: 2019    Patient will increase functional independence with mobility by performin. Supine to sit with moderate assistance  2. Sit to supine with moderate Assistance  3. Sit to stand transfer with Maximum Assistance  4. Gait  x 2 feet with Maximum Assistance using least restrictive device  5. Sitting at edge of bed x15 minutes with Contact Guard Assistance  6. Lower extremity exercise program x20 reps per handout, with assistance as needed     Outcome: Ongoing (interventions implemented as appropriate)  Goals remain appropriate.

## 2019-04-07 NOTE — PLAN OF CARE
Problem: Skin Injury Risk Increased  Goal: Skin Health and Integrity    Intervention: Optimize Skin Protection  Pt remains free from falls and injuries. tell sitter at the bedside. POC reviewed with pt and daughter verbalized understanding and teach back. Pt turned and reposition with wedge in place. No acute distress noted. Blood glucose monitoring done supplemental insulin done as ordered.

## 2019-04-08 PROBLEM — J45.51 SEVERE PERSISTENT ASTHMA WITH ACUTE EXACERBATION: Status: RESOLVED | Noted: 2018-02-21 | Resolved: 2019-04-08

## 2019-04-08 PROBLEM — R71.0 HEMOGLOBIN DROP: Status: RESOLVED | Noted: 2019-03-31 | Resolved: 2019-04-08

## 2019-04-08 PROBLEM — J45.51 SEVERE PERSISTENT ASTHMA WITH EXACERBATION: Status: RESOLVED | Noted: 2019-03-11 | Resolved: 2019-04-08

## 2019-04-08 PROBLEM — R06.02 SOB (SHORTNESS OF BREATH): Status: RESOLVED | Noted: 2019-03-20 | Resolved: 2019-04-08

## 2019-04-08 PROBLEM — R06.2 WHEEZING: Status: RESOLVED | Noted: 2019-03-25 | Resolved: 2019-04-08

## 2019-04-08 LAB
ALBUMIN SERPL BCP-MCNC: 2.7 G/DL (ref 3.5–5.2)
ALP SERPL-CCNC: 102 U/L (ref 55–135)
ALT SERPL W/O P-5'-P-CCNC: 21 U/L (ref 10–44)
ANION GAP SERPL CALC-SCNC: 11 MMOL/L (ref 8–16)
AST SERPL-CCNC: 17 U/L (ref 10–40)
BASOPHILS # BLD AUTO: 0.02 K/UL (ref 0–0.2)
BASOPHILS NFR BLD: 0.4 % (ref 0–1.9)
BILIRUB SERPL-MCNC: 0.9 MG/DL (ref 0.1–1)
BUN SERPL-MCNC: 51 MG/DL (ref 8–23)
CALCIUM SERPL-MCNC: 7.7 MG/DL (ref 8.7–10.5)
CHLORIDE SERPL-SCNC: 107 MMOL/L (ref 95–110)
CO2 SERPL-SCNC: 20 MMOL/L (ref 23–29)
CREAT SERPL-MCNC: 5.4 MG/DL (ref 0.5–1.4)
DIFFERENTIAL METHOD: ABNORMAL
EOSINOPHIL # BLD AUTO: 0.2 K/UL (ref 0–0.5)
EOSINOPHIL NFR BLD: 3.2 % (ref 0–8)
ERYTHROCYTE [DISTWIDTH] IN BLOOD BY AUTOMATED COUNT: 15.8 % (ref 11.5–14.5)
EST. GFR  (AFRICAN AMERICAN): 10.7 ML/MIN/1.73 M^2
EST. GFR  (NON AFRICAN AMERICAN): 9.3 ML/MIN/1.73 M^2
GLUCOSE SERPL-MCNC: 83 MG/DL (ref 70–110)
HCT VFR BLD AUTO: 25.1 % (ref 40–54)
HGB BLD-MCNC: 8.1 G/DL (ref 14–18)
IMM GRANULOCYTES # BLD AUTO: 0.03 K/UL (ref 0–0.04)
IMM GRANULOCYTES NFR BLD AUTO: 0.6 % (ref 0–0.5)
INR PPP: 2.8 (ref 0.8–1.2)
LYMPHOCYTES # BLD AUTO: 1.7 K/UL (ref 1–4.8)
LYMPHOCYTES NFR BLD: 35.9 % (ref 18–48)
MCH RBC QN AUTO: 31.3 PG (ref 27–31)
MCHC RBC AUTO-ENTMCNC: 32.3 G/DL (ref 32–36)
MCV RBC AUTO: 97 FL (ref 82–98)
MONOCYTES # BLD AUTO: 0.4 K/UL (ref 0.3–1)
MONOCYTES NFR BLD: 9 % (ref 4–15)
NEUTROPHILS # BLD AUTO: 2.4 K/UL (ref 1.8–7.7)
NEUTROPHILS NFR BLD: 50.9 % (ref 38–73)
NRBC BLD-RTO: 0 /100 WBC
PHOSPHATE SERPL-MCNC: 4 MG/DL (ref 2.7–4.5)
PLATELET # BLD AUTO: 177 K/UL (ref 150–350)
PMV BLD AUTO: 9.5 FL (ref 9.2–12.9)
POCT GLUCOSE: 102 MG/DL (ref 70–110)
POCT GLUCOSE: 117 MG/DL (ref 70–110)
POCT GLUCOSE: 123 MG/DL (ref 70–110)
POCT GLUCOSE: 92 MG/DL (ref 70–110)
POTASSIUM SERPL-SCNC: 3.8 MMOL/L (ref 3.5–5.1)
PROT SERPL-MCNC: 5.4 G/DL (ref 6–8.4)
PROTHROMBIN TIME: 26.7 SEC (ref 9–12.5)
RBC # BLD AUTO: 2.59 M/UL (ref 4.6–6.2)
SODIUM SERPL-SCNC: 138 MMOL/L (ref 136–145)
WBC # BLD AUTO: 4.68 K/UL (ref 3.9–12.7)

## 2019-04-08 PROCEDURE — 27000221 HC OXYGEN, UP TO 24 HOURS

## 2019-04-08 PROCEDURE — 94761 N-INVAS EAR/PLS OXIMETRY MLT: CPT

## 2019-04-08 PROCEDURE — 90935 HEMODIALYSIS ONE EVALUATION: CPT | Mod: ,,, | Performed by: NURSE PRACTITIONER

## 2019-04-08 PROCEDURE — 85025 COMPLETE CBC W/AUTO DIFF WBC: CPT

## 2019-04-08 PROCEDURE — 97530 THERAPEUTIC ACTIVITIES: CPT

## 2019-04-08 PROCEDURE — 80053 COMPREHEN METABOLIC PANEL: CPT

## 2019-04-08 PROCEDURE — 20600001 HC STEP DOWN PRIVATE ROOM

## 2019-04-08 PROCEDURE — 36415 COLL VENOUS BLD VENIPUNCTURE: CPT

## 2019-04-08 PROCEDURE — 99233 PR SUBSEQUENT HOSPITAL CARE,LEVL III: ICD-10-PCS | Mod: GC,,, | Performed by: PSYCHIATRY & NEUROLOGY

## 2019-04-08 PROCEDURE — 84100 ASSAY OF PHOSPHORUS: CPT

## 2019-04-08 PROCEDURE — 25000003 PHARM REV CODE 250: Performed by: NURSE PRACTITIONER

## 2019-04-08 PROCEDURE — 99900035 HC TECH TIME PER 15 MIN (STAT)

## 2019-04-08 PROCEDURE — 25000003 PHARM REV CODE 250: Performed by: INTERNAL MEDICINE

## 2019-04-08 PROCEDURE — 94799 UNLISTED PULMONARY SVC/PX: CPT

## 2019-04-08 PROCEDURE — 90935 PR HEMODIALYSIS, ONE EVALUATION: ICD-10-PCS | Mod: ,,, | Performed by: NURSE PRACTITIONER

## 2019-04-08 PROCEDURE — 25000003 PHARM REV CODE 250: Performed by: PHYSICIAN ASSISTANT

## 2019-04-08 PROCEDURE — 63600175 PHARM REV CODE 636 W HCPCS: Performed by: NURSE PRACTITIONER

## 2019-04-08 PROCEDURE — 25000003 PHARM REV CODE 250: Performed by: GENERAL PRACTICE

## 2019-04-08 PROCEDURE — 25000003 PHARM REV CODE 250: Performed by: HOSPITALIST

## 2019-04-08 PROCEDURE — 25000003 PHARM REV CODE 250: Performed by: STUDENT IN AN ORGANIZED HEALTH CARE EDUCATION/TRAINING PROGRAM

## 2019-04-08 PROCEDURE — 80100016 HC MAINTENANCE HEMODIALYSIS

## 2019-04-08 PROCEDURE — 85610 PROTHROMBIN TIME: CPT

## 2019-04-08 PROCEDURE — 99233 SBSQ HOSP IP/OBS HIGH 50: CPT | Mod: GC,,, | Performed by: PSYCHIATRY & NEUROLOGY

## 2019-04-08 RX ORDER — ALLOPURINOL 100 MG/1
100 TABLET ORAL DAILY
Qty: 90 TABLET | Refills: 1 | Status: SHIPPED | OUTPATIENT
Start: 2019-04-08 | End: 2019-05-14 | Stop reason: SDUPTHER

## 2019-04-08 RX ORDER — PANTOPRAZOLE SODIUM 40 MG/1
40 FOR SUSPENSION ORAL DAILY
Status: DISCONTINUED | OUTPATIENT
Start: 2019-04-09 | End: 2019-04-09 | Stop reason: HOSPADM

## 2019-04-08 RX ORDER — CARVEDILOL 25 MG/1
25 TABLET ORAL 2 TIMES DAILY WITH MEALS
Qty: 60 TABLET | Refills: 11 | Status: SHIPPED | OUTPATIENT
Start: 2019-04-08 | End: 2019-10-11 | Stop reason: SDUPTHER

## 2019-04-08 RX ORDER — RAMELTEON 8 MG/1
8 TABLET ORAL NIGHTLY PRN
Qty: 30 TABLET | Refills: 0 | Status: SHIPPED | OUTPATIENT
Start: 2019-04-08 | End: 2019-10-11 | Stop reason: ALTCHOICE

## 2019-04-08 RX ORDER — WARFARIN SODIUM 5 MG/1
5 TABLET ORAL ONCE
Qty: 1 TABLET | Refills: 0 | Status: SHIPPED | OUTPATIENT
Start: 2019-04-08 | End: 2019-04-08

## 2019-04-08 RX ORDER — WARFARIN SODIUM 5 MG/1
5 TABLET ORAL
Qty: 4 TABLET | Refills: 11 | Status: SHIPPED | OUTPATIENT
Start: 2019-04-14 | End: 2019-10-11

## 2019-04-08 RX ORDER — WARFARIN SODIUM 5 MG/1
5 TABLET ORAL
Qty: 4 TABLET | Refills: 11 | Status: SHIPPED | OUTPATIENT
Start: 2019-04-10 | End: 2019-10-11

## 2019-04-08 RX ORDER — TAMSULOSIN HYDROCHLORIDE 0.4 MG/1
0.4 CAPSULE ORAL DAILY
Status: DISCONTINUED | OUTPATIENT
Start: 2019-04-09 | End: 2019-04-09 | Stop reason: HOSPADM

## 2019-04-08 RX ORDER — DOXYCYCLINE HYCLATE 100 MG
100 TABLET ORAL EVERY 12 HOURS
Qty: 2 TABLET | Refills: 0 | Status: SHIPPED | OUTPATIENT
Start: 2019-04-08 | End: 2019-10-11 | Stop reason: ALTCHOICE

## 2019-04-08 RX ORDER — LOSARTAN POTASSIUM 100 MG/1
100 TABLET ORAL NIGHTLY
Qty: 90 TABLET | Refills: 3 | Status: SHIPPED | OUTPATIENT
Start: 2019-04-08 | End: 2019-05-22 | Stop reason: SINTOL

## 2019-04-08 RX ORDER — PANTOPRAZOLE SODIUM 40 MG/1
40 FOR SUSPENSION ORAL DAILY
Qty: 30 PACKET | Refills: 11 | Status: SHIPPED | OUTPATIENT
Start: 2019-04-09 | End: 2020-01-27

## 2019-04-08 RX ORDER — QUETIAPINE FUMARATE 25 MG/1
25 TABLET, FILM COATED ORAL NIGHTLY PRN
Qty: 30 TABLET | Refills: 0 | Status: SHIPPED | OUTPATIENT
Start: 2019-04-08 | End: 2019-10-11 | Stop reason: ALTCHOICE

## 2019-04-08 RX ORDER — WARFARIN 7.5 MG/1
7.5 TABLET ORAL
Qty: 16 TABLET | Refills: 11 | Status: SHIPPED | OUTPATIENT
Start: 2019-04-08 | End: 2019-10-11

## 2019-04-08 RX ORDER — LOSARTAN POTASSIUM 100 MG/1
100 TABLET ORAL DAILY
Qty: 90 TABLET | Refills: 0 | Status: SHIPPED | OUTPATIENT
Start: 2019-04-08 | End: 2019-05-14 | Stop reason: SDUPTHER

## 2019-04-08 RX ADMIN — WARFARIN SODIUM 7.5 MG: 2.5 TABLET ORAL at 05:04

## 2019-04-08 RX ADMIN — CARVEDILOL 25 MG: 25 TABLET, FILM COATED ORAL at 05:04

## 2019-04-08 RX ADMIN — LOSARTAN POTASSIUM 100 MG: 50 TABLET, FILM COATED ORAL at 08:04

## 2019-04-08 RX ADMIN — QUETIAPINE FUMARATE 25 MG: 25 TABLET ORAL at 08:04

## 2019-04-08 RX ADMIN — INSULIN DETEMIR 8 UNITS: 100 INJECTION, SOLUTION SUBCUTANEOUS at 12:04

## 2019-04-08 RX ADMIN — SODIUM CHLORIDE: 0.9 INJECTION, SOLUTION INTRAVENOUS at 07:04

## 2019-04-08 RX ADMIN — GABAPENTIN 300 MG: 300 CAPSULE ORAL at 08:04

## 2019-04-08 RX ADMIN — STANDARDIZED SENNA CONCENTRATE AND DOCUSATE SODIUM 1 TABLET: 8.6; 5 TABLET, FILM COATED ORAL at 08:04

## 2019-04-08 RX ADMIN — ATORVASTATIN CALCIUM 80 MG: 20 TABLET, FILM COATED ORAL at 08:04

## 2019-04-08 RX ADMIN — DOXYCYCLINE HYCLATE 100 MG: 100 TABLET, COATED ORAL at 12:04

## 2019-04-08 RX ADMIN — FINASTERIDE 5 MG: 5 TABLET, FILM COATED ORAL at 12:04

## 2019-04-08 RX ADMIN — ALLOPURINOL 100 MG: 100 TABLET ORAL at 12:04

## 2019-04-08 RX ADMIN — IRON SUCROSE 100 MG: 20 INJECTION, SOLUTION INTRAVENOUS at 12:04

## 2019-04-08 RX ADMIN — TORSEMIDE 20 MG: 20 TABLET ORAL at 08:04

## 2019-04-08 RX ADMIN — RAMELTEON 8 MG: 8 TABLET, FILM COATED ORAL at 08:04

## 2019-04-08 RX ADMIN — TORSEMIDE 20 MG: 20 TABLET ORAL at 12:04

## 2019-04-08 RX ADMIN — DOXYCYCLINE HYCLATE 100 MG: 100 TABLET, COATED ORAL at 08:04

## 2019-04-08 NOTE — PLAN OF CARE
Problem: Adult Inpatient Plan of Care  Goal: Plan of Care Review  Past Medical History:  7/29/2016: NICK (acute kidney injury)  No date: Allergy  12/15/2014: Anemia, mild  No date: Arthritis      Comment:  Gout  3/27/2012: Benign essential HTN  5/10/2018: BMI 29.0-29.9,adult  No date: BPH (benign prostatic hyperplasia)  No date: BPH (benign prostatic hyperplasia)  2006: CAD (coronary artery disease)  No date: Chronic kidney disease      Comment:  due to ibuprofen  No date: Colon polyp  No date: CRF (chronic renal failure), stage 5  No date: Diverticulosis  No date: Gastritis  No date: GERD (gastroesophageal reflux disease)  No date: Gout  5/3/2018: History of colon polyps  3/27/2012: HTN (hypertension)  No date: Hyperlipidemia  No date: Hyperlipidemia  6/14/2018: LLL pneumonia  No date: LVH (left ventricular hypertrophy)  No date: Mesenteric ischemia  3/27/2012: Murmur, cardiac  No date: GRICELDA (obstructive sleep apnea)      Comment:  DOES NOT USE A MACHINE  No date: Sinus problem  No date: Syncope and collapse      Past Surgical History:  No date: APPENDECTOMY  5/31/2016: BLOCK-NERVE; Left      Comment:  Performed by Kevin Leon MD at [facility] OR  No date: CARDIAC CATHETERIZATION  2011: COLONOSCOPY  5/3/2018: COLONOSCOPY; N/A      Comment:  Performed by Messi Harris MD at [facility] ENDO  9/10/2015: COLONOSCOPY; N/A      Comment:  Performed by Messi Harris MD at [facility] ENDO  4/2007: CORONARY ARTERY BYPASS GRAFT      Comment:  x 1  8/30/2017: CYSTOSCOPY; N/A      Comment:  Performed by Rudy Herring MD at [facility] OR  11/10/2015: CYSTOSCOPY; N/A      Comment:  Performed by Rudy Herring MD at [facility] OR  1/4/2016: ESOPHAGOGASTRODUODENOSCOPY (EGD); N/A      Comment:  Performed by Tra Brooks MD at [facility] ENDO (2ND FLR)  10/15/2014: ESOPHAGOGASTRODUODENOSCOPY (EGD); N/A      Comment:  Performed by Messi Harris MD at [facility] ENDO  2/25/2016: INJECTION-STEROID-EPIDURAL-TRANSFORAMINAL; Left      Comment:  Performed by  Kevin Leon MD at [facility] OR  No date: JOINT REPLACEMENT      Comment:  left knee total replacement  X 3  No date: mid leftt finger      Comment:  from a donovantelkin  2016: MOLE REMOVAL  4/11/2016: RADIOFREQUENCY THERMOCOAGULATION (RFTC)-NERVE-MEDIAN   BRANCH-LUMBAR; Left      Comment:  Performed by Kevin Leon MD at [facility] OR  No date: rotative cuff      Comment:  no rotative cuffs on bilat shoulders has pins   No date: SHOULDER SURGERY      Comment:  shoulder surgery bilat  RIGHT X 4; LEFT X 3  11/10/2015: TRANSRECTAL ULTRASOUND GUIDED PROSTATE BIOPSY; Bilateral      Comment:  Performed by Rudy Herring MD at [facility] OR  5/31/2017: ULTRASOUND-ENDOSCOPIC-UPPER; N/A      Comment:  Performed by Tra Brooks MD at [facility] ENDO (2ND FLR)  1/4/2016: ULTRASOUND-ENDOSCOPIC-UPPER; N/A      Comment:  Performed by Tra Brooks MD at [facility] ENDO (2ND FLR)  1/16/2015: ULTRASOUND-ENDOSCOPIC-UPPER; N/A      Comment:  Performed by Jason Saleem MD at [facility] ENDO (2ND FLR)    Patient likes blankets on.    Outcome: Ongoing (interventions implemented as appropriate)  Pt lying quietly in bed with eyes closed. Pt VSS, even and unlabored resp no distress noted at this time. Pt refused CPAP.SCDs were placed during the night pt asked for left one to be remove [id number] Quads. Incontinent X2 with adult brief for extra protection. Pt received bath oncoming of shift. Pt disorientedX3 oriented to self. Q2h rounding and PRN. Bed @ lowest position,bed alarm set, call bell in reach, instructed to call when assistance is needed. Will continue to monitor.

## 2019-04-08 NOTE — PROGRESS NOTES
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  He has significant intracranial/extrancranial atherosclerotic disease seen on CTA head/neck. Etiology cardioembolic versus atheroembolic.      - Anticoagulants: Coumadin daily adjusting for INR goal of 2.0-3.0   - Statins: Atorvastatin- 80 mg daily  -Diagnostic studies pending: INR   - Aggressive risk factor modification: HTN, DM, HLD, A-Fib, CAD, intracranial atherosclerosis   - Rehab efforts: PT/OT to evaluate and treat ->recommending Rehab, SLP -> recommending Regular diet/nectar thick  - VTE prophylaxis: anticoagulated  - BP parameters: target sBP<160      Umbilical hernia  + abdominal pain during hospitalization  CT scan ordered showed umbilical hernia  General surgery consulted and recommended outpatient follow up     Cytotoxic cerebral edema  Large area of cytotoxic cerebral edema identified when reviewing brain imaging in the territory of the left posterior cerebral artery. There is mass effect associated with it. We will continue to monitor the patients clinical exam for any worsening of symptoms which may indicate expansion of the stroke or the area of the edema resulting in the clinical change. The pattern is suggestive of atheroembolic etiology.        Hemoglobin drop    - continue to monitor     Groin hematoma  Monitoring daily,continue to worsen  Stat right femoral arterial ultrasound ordered and negative for vasculature abnormalities   Soft - continue to monitor     Dysarthria due to acute cerebrovascular accident (CVA)  Continued SLP  - recommend renal diet, cardiac and diabetic with nectar thick   When awake     Cellulitis of right arm  R forearm edema   Arterial line removed, evaluated by ortho for  compartment syndrome which was negative as well as US  Tried vanc and clinda   De-escalated at this time and on doxy last dose will be 04/09  Continues to improve    Patent foramen ovale  Risk factor for stroke  Seen on echocardiogram     Paroxysmal atrial fibrillation  Risk factor for stroke   CHADsVASc 7    - Carvedilol for rate control   -coumadin daily     ESRD (end stage renal disease) on dialysis  Patient is being followed by nephrology, appreciate their assistance  Continue scheduled HD, MWF    Dialysis today     NSTEMI (non-ST elevated myocardial infarction)  Seen by cards on 3/25/19   Continue medical management   Does not complain of chest pain at this time     Type 2 diabetes mellitus, without long-term current use of insulin  Poorly controlled, A1C 8.3  Current glucose running ~100-150  Detemir 8 units daily  MD SSI  POCt glucose q6 hr    Long term (current) use of anticoagulants  - coumadin daily  INR 2.8 today     Benign prostatic hyperplasia without lower urinary tract symptoms  H/o and CT showed enlarged prostate   - continue flomax and finasteride  -has been making urine and several episodes of incontinence per sister in law but has not urinated since yesterday  -u/a negative, bladder scan   - hematuria with clots - urology consulted , condom cath ordered, resolved   -q 6 hour bladder scans     Anemia of chronic disease  Due to ESRD   receives EPO every 2 weeks   H/h continues to trend down.  Blood transfusion 03/31 1unit  Stable currently   Hematuria resolved       Obstructive sleep apnea  Sleep study in 2014  CONCLUSION:  Nocturnal polysomnography demonstrates:  1.  Obstructive sleep apnea to be present with 44.2 events an hour with   desaturation to 81%.  2.  Sinus rhythm with occasional PVC.    Apparently lost to follow up- per daughter, reported that no one ever called to tell him report   Snoring in the hospital overnight   Plan to try cpap in hospital -can take off if unable to tolerate    Stroke risk factor     Gout, arthritis  - continue allopurinol    CAD (coronary artery disease), 2V CABG 2007  Risk factor for stroke  Continue statin.       Hyperlipidemia  LDL 46  Continue atorvastatin 80 mg due to extensive intracranial atherosclerotic disease      Essential hypertension  Risk factor for stroke   SBP<160  continue Torsemide 20 mg q12, losartan 100 mg qd, and Carvedilol 25 mg q12        Carotid artery stenosis  Noted CTA and carotid US  Patient evaluated by vascular surgery, recommended maximal medical management    Hematuria  Complicated by low platelets, need for anticoagulation  Urology consulted   q6 hour bladder scans for retention   Condom cath ordered - planning to monitor at this point.   Resolved        Admitted to hospital medicine for unstable angina eval, vascular neurology consulted for right sided weakness, facial droop and dysarthria, MRI showed L PCA infarction, not candidate for TPA ( symptoms duration >4 hours)patient symptoms improved with laying flat, admitted to neuro critical care. Patient with known PCA infarct on L with undulating symptom pattern with modification to level of recumbency. Patient restarted on heparin gtt. Underwent US of LE and of carotid with evidence of <50% L ICA stenosis and 60-70% R ICA stenosis, heavily calcified on CTA, additionally L vertebral also heavily calcified. Patient tolerated HD. Neuro deficit appear to be more severe on 3/27 with decreasing right arm strength now 2/5 and with worsening dysarthria/aphasia. Patient with gross swelling of R forearm - tense, pulses intact.   03/29/2019 patient with worsening of neurological deficit, unable to move right upper extremity, patient with pain on passive movement of right fingers exhibited by facial grimace as verbal response to pain/sensation/light touch no longer reliable, aphasia/dysarthria worsening, RLE with 2/5 strength today, patient evaluated by orthopedic surgery regarding R forearm  "yesterday and today and feel that this is not compartment syndrome and will continue to monitor, distal pulses remain palpable  3/30: NAEON. On heparin gtt. Plans to step down  3/31: stepped down to VN without major events. transfused with pRBC x1 due to drop in Hbg. Discontinued heparin gtt. INR increased to 1.2, increased warfarin to 7.5  04/01/19 Visited patient in dialysis today.  On coumadin and atorvastatin for secondary stroke prevention H/H stabilized at 7.6 after 1 unit pRBC transfusion on 03/31.  Epo given during dialysis.  Patient has worsening right groin hematoma.  Stat right femoral artery u/s ordered.  On clindamycin for suspected right arm cellulitis.  Sister-in-law concern for urination.  Patient was incontinent and making urine but today has not had any urination.  U/A and bladder scan ordered   04/02/19 hospital medicine consulted for multiple medical issues. Right arm edema worsening.  Switching antibiotics from oral to IV     4/3/19 - drowsy today, less interactive. Plan for dialysis today. Noted to have gross hematuria with clots , bladder scan 315.   4/4/19 - will place condom catheter for hematuria monitoring, H&H stable. Hospital medicine helping with cellulitis. cpap ordered for night  4/5/19 - dialysis today, agitated overnight and throughout the day. Appears angry telling his family today "leave me alone, go home". Will plan for telesitter, pharmacologic therapy ordered prn tonight, with plans to involve psych if not able to control as at this time its causing him to be less interactive with therapy   04/06 Patient agitation improved with Seroquel and ramelteon.  INR increased to 2.8.  Coumadin decreased from 7.5mg to 5mg qd.  Will continue to monitor daily.  Edema in left arm improving, will continue doxycycline for cellulitis   04/07/19 Right arm edema and redness improving.  Last dose of doxycycline will be 04/09.  INR 2.5.  Patient stable for discharge   04/08/19: No acute events " overnight. Patient medically ready for discharge    STROKE DOCUMENTATION        NIH Scale:  1a. Level of Consciousness: 0-->Alert, keenly responsive  1b. LOC Questions: 2-->Answers neither question correctly  1c. LOC Commands: 1-->Performs one task correctly  2. Best Gaze: 0-->Normal  3. Visual: 2-->Complete hemianopia  4. Facial Palsy: 0-->Normal symmetrical movements  5a. Motor Arm, Left: 1-->Drift, limb holds 90 (or 45) degrees, but drifts down before full 10 seconds, does not hit bed or other support  5b. Motor Arm, Right: 3-->No effort against gravity, limb falls  6a. Motor Leg, Left: 1-->Drift, leg falls by the end of the 5-sec period but does not hit bed  6b. Motor Leg, Right: 3-->No effort against gravity, leg falls to bed immediately  7. Limb Ataxia: 0-->Absent  8. Sensory: 1-->Mild-to-moderate sensory loss, patient feels pinprick is less sharp or is dull on the affected side, or there is a loss of superficial pain with pinprick, but patient is aware of being touched  9. Best Language: 2-->Severe aphasia, all communication is through fragmentary expression, great need for inference, questioning, and guessing by the listener. Range of information that can be exchanged is limited, listener carries burden of. . . (see row details)  10. Dysarthria: 0-->Normal  11. Extinction and Inattention (formerly Neglect): 0-->No abnormality  Total (NIH Stroke Scale): 16       Modified Steve Score: 4  Sachin Coma Scale:    ABCD2 Score:    [id number] Score:8  HAS -BLED Score:   ICH Score:   Hunt & Sharp Classification:      Hemorrhagic change of an Ischemic Stroke: Does this patient have an ischemic stroke with hemorrhagic changes? No     Neurologic Chief Complaint: RSW + difficulty with speech    Subjective:     Interval History: No acute events overnight. Patient medically ready for discharge    HPI, Past Medical, Family, and Social History remains the same as documented in the initial encounter.     Review of Systems    Constitutional: Positive for fatigue. Negative for fever.   Eyes: Positive for visual disturbance.   Neurological: Positive for facial asymmetry, speech difficulty and weakness.     Scheduled Meds:   allopurinol  100 mg Oral Daily    atorvastatin  80 mg Oral Daily    carvedilol  25 mg Oral BID WM    doxycycline  100 mg Oral Q12H    finasteride  5 mg Oral Daily    gabapentin  300 mg Oral QHS    insulin detemir U-100  8 Units Subcutaneous Daily    iron sucrose (VENOFER) IVPB  100 mg Intravenous Once per day on Mon Wed Fri    losartan  100 mg Oral QHS    [START ON 4/9/2019] pantoprazole  40 mg Oral Daily    polyethylene glycol  17 g Oral Daily    senna-docusate 8.6-50 mg  1 tablet Oral BID    [START ON 4/9/2019] tamsulosin  0.4 mg Oral Daily    torsemide  20 mg Oral BID    [START ON 4/14/2019] warfarin  5 mg Oral Every Sun    [START ON 4/10/2019] warfarin  5 mg Oral Every Wed    [START ON 4/12/2019] warfarin  5 mg Oral Every Fri    warfarin  7.5 mg Oral Every Mon, Tues, Thurs, Sat     Continuous Infusions:    PRN Meds:sodium chloride, sodium chloride 0.9%, acetaminophen, albuterol-ipratropium, dextrose 50%, dextrose 50%, glucagon (human recombinant), glucose, glucose, insulin aspart U-100, nitroGLYCERIN, ondansetron, QUEtiapine, ramelteon    Objective:     Vital Signs (Most Recent):  Temp: 98.7 °F (37.1 °C) (04/08/19 1145)  Pulse: 71 (04/08/19 1530)  Resp: 14 (04/08/19 1402)  BP: (!) 114/54 (04/08/19 1145)  SpO2: 100 % (04/08/19 1145)  BP Location: Right leg    Vital Signs Range (Last 24H):  Temp:  [97.4 °F (36.3 °C)-98.7 °F (37.1 °C)]   Pulse:  [59-88]   Resp:  [14-20]   BP: (114-194)/(50-75)   SpO2:  [95 %-100 %]   BP Location: Right leg    Physical Exam   Constitutional: He appears well-developed and well-nourished. No distress.   HENT:   Head: Normocephalic and atraumatic.   Cardiovascular: Normal rate.   Pulmonary/Chest: Effort normal. No accessory muscle usage. No respiratory distress.    Genitourinary:   Genitourinary Comments: Hematoma in the R groin   Skin: Skin is warm. He is not diaphoretic.   Nursing note and vitals reviewed.    Neurological Exam:   LOC: drowsy- arouses to voice  Language and articulation:expressive (nods appropriately) and receptive  Visual Fields: no blink to threat right    Decreased sensation on right   Motor: right upper extremity 1/5 , RLE 2/5  Left side 4/5       Laboratory:  Recent Results (from the past 24 hour(s))   POCT glucose    Collection Time: 04/07/19  4:40 PM   Result Value Ref Range    POCT Glucose 130 (H) 70 - 110 mg/dL   Comprehensive metabolic panel    Collection Time: 04/08/19  4:40 AM   Result Value Ref Range    Sodium 138 136 - 145 mmol/L    Potassium 3.8 3.5 - 5.1 mmol/L    Chloride 107 95 - 110 mmol/L    CO2 20 (L) 23 - 29 mmol/L    Glucose 83 70 - 110 mg/dL    BUN, Bld 51 (H) 8 - 23 mg/dL    Creatinine 5.4 (H) 0.5 - 1.4 mg/dL    Calcium 7.7 (L) 8.7 - 10.5 mg/dL    Total Protein 5.4 (L) 6.0 - 8.4 g/dL    Albumin 2.7 (L) 3.5 - 5.2 g/dL    Total Bilirubin 0.9 0.1 - 1.0 mg/dL    Alkaline Phosphatase 102 55 - 135 U/L    AST 17 10 - 40 U/L    ALT 21 10 - 44 U/L    Anion Gap 11 8 - 16 mmol/L    eGFR if African American 10.7 (A) >60 mL/min/1.73 m^2    eGFR if non  9.3 (A) >60 mL/min/1.73 m^2   Protime-INR    Collection Time: 04/08/19  4:40 AM   Result Value Ref Range    Prothrombin Time 26.7 (H) 9.0 - 12.5 sec    INR 2.8 (H) 0.8 - 1.2   Phosphorus    Collection Time: 04/08/19  4:40 AM   Result Value Ref Range    Phosphorus 4.0 2.7 - 4.5 mg/dL   CBC auto differential    Collection Time: 04/08/19  4:41 AM   Result Value Ref Range    WBC 4.68 3.90 - 12.70 K/uL    RBC 2.59 (L) 4.60 - 6.20 M/uL    Hemoglobin 8.1 (L) 14.0 - 18.0 g/dL    Hematocrit 25.1 (L) 40.0 - 54.0 %    MCV 97 82 - 98 fL    MCH 31.3 (H) 27.0 - 31.0 pg    MCHC 32.3 32.0 - 36.0 g/dL    RDW 15.8 (H) 11.5 - 14.5 %    Platelets 177 150 - 350 K/uL    MPV 9.5 9.2 - 12.9 fL    Immature  Granulocytes 0.6 (H) 0.0 - 0.5 %    Gran # (ANC) 2.4 1.8 - 7.7 K/uL    Immature Grans (Abs) 0.03 0.00 - 0.04 K/uL    Lymph # 1.7 1.0 - 4.8 K/uL    Mono # 0.4 0.3 - 1.0 K/uL    Eos # 0.2 0.0 - 0.5 K/uL    Baso # 0.02 0.00 - 0.20 K/uL    nRBC 0 0 /100 WBC    Gran% 50.9 38.0 - 73.0 %    Lymph% 35.9 18.0 - 48.0 %    Mono% 9.0 4.0 - 15.0 %    Eosinophil% 3.2 0.0 - 8.0 %    Basophil% 0.4 0.0 - 1.9 %    Differential Method Automated    POCT glucose    Collection Time: 04/08/19  8:52 AM   Result Value Ref Range    POCT Glucose 92 70 - 110 mg/dL   POCT glucose    Collection Time: 04/08/19 11:42 AM   Result Value Ref Range    POCT Glucose 123 (H) 70 - 110 mg/dL       Diagnostic Results     Brain Imaging   CTH non-contrast (3/28/2019):   Evolving subacute to chronic appearing infarcts in L thalamus and L PCA territory  small remote lacune in R putamen.  Cytotoxic cerebral edema    MRI Brain (3/26/19):  New acute left PCA territory distribution infarctions involving L thalamus and L paramedian occipital lobe, as well as L splenium of the corpus callosum   Generalized cerebral volume loss   significant chronic microvascular ischemic disease.      MRI Brain (3/24/19):  Vascular findings similar to CTH and CTA    Vessel Imaging   CTA-MP (3/24/19):  Extensive intracranial atherosclerotic disease:  Left PCA with a high-grade stenosis. High-grade (>70%) stenosis right proximal ICA and moderate (50-70%) stenosis left proximal ICA. Moderate to high-grade stenosis of the intra cavernous/ supraclinoid ICAs bilaterally. High-grade stenosis distal left vertebral artery.    Cardiac Imaging   TTE (3/23/19):  EF 55%, LVH  severe bilateral atrial enlargement   PA pressure  41   PFO with left to right shunting        Tammy Saucedo NP  [facility]  Department of Vascular Neurology   [facility]

## 2019-04-08 NOTE — PLAN OF CARE
Patient has been accepted to [facility] and patient can transfer today if medically ready for discharge. Notified team.     04/08/19 1532   Post-Acute Status   Post-Acute Authorization Placement   Post-Acute Placement Status Authorization Obtained

## 2019-04-08 NOTE — PROGRESS NOTES
HEMODIALYSIS NOTE  Patient evaluated while undergoing hemodialysis indicated for ESRD. Tolerating session with current UFR, no complications. . UF goal 2L as tolerated.    -Daughter, Agapito, at bedside  -Pt disoriented to person and place  -hold epogen   -continue venofer  -on torsemide BID  -daily bed weights and charts   -continue renal diet    -pt's daughter with concern of pt's prognosis relative to CVA/Renal; states has discussed with Vascular Neuro  -pt's daughter also with concern of foods she can prepare at home for pt; online resource provided; recommend Nutrition Consult

## 2019-04-08 NOTE — PLAN OF CARE
Problem: Occupational Therapy Goal  Goal: Occupational Therapy Goal  Goals to be met by:  4/15/19    Patient will increase functional independence with ADLs by performing:    UE Dressing with Moderate Assistance using nikita-dressing technique.  Grooming while EOB with Moderate Assistance.  Rolling to Right with minimal assistance  Rolling to Left with Maximum Assistance.   Supine to sit with Mod Assistance.  Toilet transfer to bedside commode with Maximum Assistance.  Pt/family demo and verbalized understanding of  BUE HEP and proper positioning in bed to prevent edema, contractures, and pressure ulcers.  Pt follow 100% of simple one-step commands with minimal cues provided.       Outcome: Ongoing (interventions implemented as appropriate)  Goals extended. POC to 3x/w at this time. DIANE Olmos 4/8/2019

## 2019-04-08 NOTE — PLAN OF CARE
04/08/19 1204   Post-Acute Status   Post-Acute Authorization Placement   Post-Acute Placement Status Referrals Sent       [facility] accepted. Pt's family going to tour facility and sign paperwork. Once all that happens patient can be d/c to [facility].  SW in contact with CM and Medical staff. Will continue to follow and offer support as needed.     Braden Gomez, PATRICK  [facility]   Ext. [phone]

## 2019-04-08 NOTE — PT/OT/SLP PROGRESS
Occupational Therapy      Patient Name:  Micheal Hunt   MRN:  [id number]    Patient not seen today secondary to Dialysis. Will follow-up 4/9/2019 as appropriate. Writing OT unable to return in PM on this date.    DIANE Olmos  4/8/2019

## 2019-04-08 NOTE — ASSESSMENT & PLAN NOTE
R forearm edema   Arterial line removed, evaluated by ortho for compartment syndrome which was negative as well as US  Tried vanc and clinda   De-escalated at this time and on doxy last dose will be 04/09  Continues to improve

## 2019-04-08 NOTE — PT/OT/SLP PROGRESS
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  He presents with delirium at this time. He demo R hemiparesis and aphasia- he is unsafe to return home alone at this time. He would greatly benefit from rehab at post acute level of care to max functional outcomes and safety. Performance deficits affecting function are weakness, impaired endurance, impaired sensation, impaired self care skills, impaired functional mobilty, gait instability, impaired cognition, decreased upper extremity function, decreased lower extremity function, impaired balance, decreased coordination, decreased safety awareness, pain, impaired cardiopulmonary response to activity, impaired skin.     Rehab Prognosis:  Fair; patient would benefit from acute skilled OT services to address these deficits and reach maximum level of function.       Plan:     Patient to be seen 3 x/week to address the above listed problems via self-care/home management, therapeutic activities, therapeutic exercises, neuromuscular re-education, cognitive retraining  · Plan of Care Expires: 04/28/19  · Plan of Care Reviewed with: patient    Subjective   Pt reported "They went downstairs" --when asked about his family  Pain/Comfort:  · Pain Rating 1: 0/10  · Pain Rating Post-Intervention 1: 0/10    Objective:     Communicated with: RN prior to session.  Patient found HOB elevated with (midline; [employer]) upon OT entry to room.    General Precautions: Standard, aphasia, aspiration, fall, " nectar thick, dental soft(delirium)   Orthopedic Precautions:N/A   Braces: N/A     Occupational Performance:   Bed Mobility:    · Patient completed Rolling/Turning to Left with  moderate assistance  · Patient completed Supine to Sit with maximal assistance  · Patient completed Sit to Supine with maximal assistance     Functional Mobility/Transfers:  · Combative with attempts/declined    Activities of Daily Living:  · Upper Body Dressing: seated EOB with max(A) with hemitechnique to don gown as jacket     · Declined grooming tasks    [facility] 6 Click ADL: 12    Treatment & Education:  -Pt alert with eyes open throughout; able to verbalize orientation to person only  -Provided with verbal orientation ; Pt able to sequence 9/12 months of the year  -Pt following simple step commands inconsistently   -EOB x10 min with min(A)with L UE self support on railing   *facilitation completed for R UE extended arm weight bearing  *AAROM for R UE for push/pull tasks x10 reps; encouraged visual attention with tasks  *Pt began to become mildly combative with tasks; requiring return to supine  -return to supine in upright position; R UE in elevation and extension with abduction  -Communication board updated; questions/concerns addressed within OT scope of practice  -no family at bedside for education     Patient left HOB elevated with all lines intact, call button in reach and RN and [employer] notifiedEducation:      GOALS:   Multidisciplinary Problems     Occupational Therapy Goals        Problem: Occupational Therapy Goal    Goal Priority Disciplines Outcome Interventions   Occupational Therapy Goal     OT, PT/OT Ongoing (interventions implemented as appropriate)    Description:  Goals to be met by:  4/15/19    Patient will increase functional independence with ADLs by performing:    UE Dressing with Moderate Assistance using nikita-dressing technique.  Grooming while EOB with Moderate Assistance.  Rolling to Right with minimal  assistance  Rolling to Left with Maximum Assistance.   Supine to sit with Mod Assistance.  Toilet transfer to bedside commode with Maximum Assistance.  Pt/family demo and verbalized understanding of  BUE HEP and proper positioning in bed to prevent edema, contractures, and pressure ulcers.  Pt follow 100% of simple one-step commands with minimal cues provided.                         Time Tracking:     OT Date of Treatment: 04/08/19  OT Start Time: 1401  OT Stop Time: 1425  OT Total Time (min): 24 min    Billable Minutes:Therapeutic Activity 24    DIANE Olmos  4/8/2019

## 2019-04-08 NOTE — SUBJECTIVE & OBJECTIVE
Neurologic Chief Complaint: RSW + difficulty with speech    Subjective:     Interval History: No acute events overnight. Patient medically ready for discharge    HPI, Past Medical, Family, and Social History remains the same as documented in the initial encounter.     Review of Systems   Constitutional: Positive for fatigue. Negative for fever.   Eyes: Positive for visual disturbance.   Neurological: Positive for facial asymmetry, speech difficulty and weakness.     Scheduled Meds:   allopurinol  100 mg Oral Daily    atorvastatin  80 mg Oral Daily    carvedilol  25 mg Oral BID WM    doxycycline  100 mg Oral Q12H    finasteride  5 mg Oral Daily    gabapentin  300 mg Oral QHS    insulin detemir U-100  8 Units Subcutaneous Daily    iron sucrose (VENOFER) IVPB  100 mg Intravenous Once per day on Mon Wed Fri    losartan  100 mg Oral QHS    [START ON 4/9/2019] pantoprazole  40 mg Oral Daily    polyethylene glycol  17 g Oral Daily    senna-docusate 8.6-50 mg  1 tablet Oral BID    [START ON 4/9/2019] tamsulosin  0.4 mg Oral Daily    torsemide  20 mg Oral BID    [START ON 4/14/2019] warfarin  5 mg Oral Every Sun    [START ON 4/10/2019] warfarin  5 mg Oral Every Wed    [START ON 4/12/2019] warfarin  5 mg Oral Every Fri    warfarin  7.5 mg Oral Every Mon, Tues, Thurs, Sat     Continuous Infusions:    PRN Meds:sodium chloride, sodium chloride 0.9%, acetaminophen, albuterol-ipratropium, dextrose 50%, dextrose 50%, glucagon (human recombinant), glucose, glucose, insulin aspart U-100, nitroGLYCERIN, ondansetron, QUEtiapine, ramelteon    Objective:     Vital Signs (Most Recent):  Temp: 98.7 °F (37.1 °C) (04/08/19 1145)  Pulse: 71 (04/08/19 1530)  Resp: 14 (04/08/19 1402)  BP: (!) 114/54 (04/08/19 1145)  SpO2: 100 % (04/08/19 1145)  BP Location: Right leg    Vital Signs Range (Last 24H):  Temp:  [97.4 °F (36.3 °C)-98.7 °F (37.1 °C)]   Pulse:  [59-88]   Resp:  [14-20]   BP: (114-194)/(50-75)   SpO2:  [95 %-100 %]    BP Location: Right leg    Physical Exam   Constitutional: He appears well-developed and well-nourished. No distress.   HENT:   Head: Normocephalic and atraumatic.   Cardiovascular: Normal rate.   Pulmonary/Chest: Effort normal. No accessory muscle usage. No respiratory distress.   Genitourinary:   Genitourinary Comments: Hematoma in the R groin   Skin: Skin is warm. He is not diaphoretic.   Nursing note and vitals reviewed.    Neurological Exam:   LOC: drowsy- arouses to voice  Language and articulation:expressive (nods appropriately) and receptive  Visual Fields: no blink to threat right    Decreased sensation on right   Motor: right upper extremity 1/5 , RLE 2/5  Left side 4/5       Laboratory:  Recent Results (from the past 24 hour(s))   POCT glucose    Collection Time: 04/07/19  4:40 PM   Result Value Ref Range    POCT Glucose 130 (H) 70 - 110 mg/dL   Comprehensive metabolic panel    Collection Time: 04/08/19  4:40 AM   Result Value Ref Range    Sodium 138 136 - 145 mmol/L    Potassium 3.8 3.5 - 5.1 mmol/L    Chloride 107 95 - 110 mmol/L    CO2 20 (L) 23 - 29 mmol/L    Glucose 83 70 - 110 mg/dL    BUN, Bld 51 (H) 8 - 23 mg/dL    Creatinine 5.4 (H) 0.5 - 1.4 mg/dL    Calcium 7.7 (L) 8.7 - 10.5 mg/dL    Total Protein 5.4 (L) 6.0 - 8.4 g/dL    Albumin 2.7 (L) 3.5 - 5.2 g/dL    Total Bilirubin 0.9 0.1 - 1.0 mg/dL    Alkaline Phosphatase 102 55 - 135 U/L    AST 17 10 - 40 U/L    ALT 21 10 - 44 U/L    Anion Gap 11 8 - 16 mmol/L    eGFR if African American 10.7 (A) >60 mL/min/1.73 m^2    eGFR if non  9.3 (A) >60 mL/min/1.73 m^2   Protime-INR    Collection Time: 04/08/19  4:40 AM   Result Value Ref Range    Prothrombin Time 26.7 (H) 9.0 - 12.5 sec    INR 2.8 (H) 0.8 - 1.2   Phosphorus    Collection Time: 04/08/19  4:40 AM   Result Value Ref Range    Phosphorus 4.0 2.7 - 4.5 mg/dL   CBC auto differential    Collection Time: 04/08/19  4:41 AM   Result Value Ref Range    WBC 4.68 3.90 - 12.70 K/uL    RBC  2.59 (L) 4.60 - 6.20 M/uL    Hemoglobin 8.1 (L) 14.0 - 18.0 g/dL    Hematocrit 25.1 (L) 40.0 - 54.0 %    MCV 97 82 - 98 fL    MCH 31.3 (H) 27.0 - 31.0 pg    MCHC 32.3 32.0 - 36.0 g/dL    RDW 15.8 (H) 11.5 - 14.5 %    Platelets 177 150 - 350 K/uL    MPV 9.5 9.2 - 12.9 fL    Immature Granulocytes 0.6 (H) 0.0 - 0.5 %    Gran # (ANC) 2.4 1.8 - 7.7 K/uL    Immature Grans (Abs) 0.03 0.00 - 0.04 K/uL    Lymph # 1.7 1.0 - 4.8 K/uL    Mono # 0.4 0.3 - 1.0 K/uL    Eos # 0.2 0.0 - 0.5 K/uL    Baso # 0.02 0.00 - 0.20 K/uL    nRBC 0 0 /100 WBC    Gran% 50.9 38.0 - 73.0 %    Lymph% 35.9 18.0 - 48.0 %    Mono% 9.0 4.0 - 15.0 %    Eosinophil% 3.2 0.0 - 8.0 %    Basophil% 0.4 0.0 - 1.9 %    Differential Method Automated    POCT glucose    Collection Time: 04/08/19  8:52 AM   Result Value Ref Range    POCT Glucose 92 70 - 110 mg/dL   POCT glucose    Collection Time: 04/08/19 11:42 AM   Result Value Ref Range    POCT Glucose 123 (H) 70 - 110 mg/dL       Diagnostic Results     Brain Imaging   CTH non-contrast (3/28/2019):   Evolving subacute to chronic appearing infarcts in L thalamus and L PCA territory  small remote lacune in R putamen.  Cytotoxic cerebral edema    MRI Brain (3/26/19):  New acute left PCA territory distribution infarctions involving L thalamus and L paramedian occipital lobe, as well as L splenium of the corpus callosum   Generalized cerebral volume loss   significant chronic microvascular ischemic disease.      MRI Brain (3/24/19):  Vascular findings similar to CTH and CTA    Vessel Imaging   CTA-MP (3/24/19):  Extensive intracranial atherosclerotic disease:  Left PCA with a high-grade stenosis. High-grade (>70%) stenosis right proximal ICA and moderate (50-70%) stenosis left proximal ICA. Moderate to high-grade stenosis of the intra cavernous/ supraclinoid ICAs bilaterally. High-grade stenosis distal left vertebral artery.    Cardiac Imaging   TTE (3/23/19):  EF 55%, LVH  severe bilateral atrial enlargement   PA  pressure  41   PFO with left to right shunting

## 2019-04-08 NOTE — ASSESSMENT & PLAN NOTE
77 y/o M with multiple co morbidities (A.fib, HTN, CHF, ESRD, DM) now with L PCA syndrome. 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  He has significant intracranial/extrancranial atherosclerotic disease seen on CTA head/neck. Etiology cardioembolic versus atheroembolic.      - Anticoagulants: Coumadin daily adjusting for INR goal of 2.0-3.0   - Statins: Atorvastatin- 80 mg daily  -Diagnostic studies pending: INR   - Aggressive risk factor modification: HTN, DM, HLD, A-Fib, CAD, intracranial atherosclerosis   - Rehab efforts: PT/OT to evaluate and treat ->recommending Rehab, SLP -> recommending Regular diet/nectar thick  - VTE prophylaxis: anticoagulated  - BP parameters: target sBP<160

## 2019-04-08 NOTE — PLAN OF CARE
[facility]    FACILITY TRANSFER ORDERS      Patient Name: Micheal Hunt  YOB: 1940    PCP: Pk Lakhani MD   PCP Address: [address] [place] [id number]  PCP Phone Number: [phone]  PCP Fax: [phone]    Encounter Date: 04/08/2019    Admit to: [facility]    Vital Signs:  Routine    Diagnoses:   Active Hospital Problems    Diagnosis  POA    *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 [I63.332]  No    Umbilical hernia without obstruction and without gangrene [K42.9]  Yes    Umbilical hernia [K42.9]  Unknown    Cytotoxic cerebral edema [G93.6]  Unknown    Groin hematoma [S30.1XXA]  Unknown    Cellulitis of right arm [L03.113]  Unknown    Dysarthria due to acute cerebrovascular accident (CVA) [I63.9, R47.1]  Unknown    Encephalopathy [G93.40]  Unknown    Patent foramen ovale [Q21.1]  Not Applicable    Paroxysmal atrial fibrillation [I48.0]  Yes    ESRD (end stage renal disease) on dialysis [N18.6, Z99.2]  Not Applicable    NSTEMI (non-ST elevated myocardial infarction) [I21.4]  Yes    Type 2 diabetes mellitus, without long-term current use of insulin [E11.9]  Yes    Hypertension due to end stage renal disease caused by type 2 diabetes mellitus, on dialysis [E11.22, I12.0, Z99.2, N18.6]  Not Applicable    Pulmonary hypertension [I27.20]  Yes    Long term (current) use of anticoagulants [Z79.01]  Not Applicable    Benign prostatic hyperplasia without lower urinary tract symptoms [N40.0]  Yes    Anemia of chronic disease [D63.8]  Yes    Obstructive sleep apnea [G47.33]  Yes    Gout, arthritis [M10.9]  Yes    CAD (coronary artery disease), 2V CABG 2007 [I25.10]  Yes    Essential hypertension [I10]  Yes    Hyperlipidemia [E78.5]  Yes    Carotid artery stenosis [I65.29]  Yes      Resolved Hospital Problems    Diagnosis Date Resolved POA    Hemoglobin drop [R71.0] 04/08/2019 Unknown    CAP (community acquired pneumonia) [J18.9]  03/25/2019 Unknown    Wheezing [R06.2] 04/08/2019 Yes    SOB (shortness of breath) [R06.02] 04/08/2019 Yes    Severe persistent asthma with exacerbation [J45.51] 04/08/2019 Yes    Severe persistent asthma with acute exacerbation [J45.51] 04/08/2019 Yes    Hematuria [R31.9] 04/08/2019 No       Allergies:  Review of patient's allergies indicates:   Allergen Reactions    Ace inhibitors Other (See Comments)     Cough    Arb-angiotensin receptor antagonist Itching    Eplerenone Other (See Comments)     Marked bradycardia, 40, tiredness and weakness      Sulfa (sulfonamide antibiotics) Itching     Patient says this was 10 years ago and doesn't remember what happened       Diet: Diet Dysphagia Mechanical Soft (IDDSI Level 5); Cardiac (Low Na/Chol), Coumadin restriction, renal; Nectar thick liquids    Activities: Activity as tolerated    Nursing: Per facility protocol     Labs: per facility protocol     CONSULTS:    Physical Therapy to evaluate and treat. , Occupational Therapy to evaluate and treat., Speech Therapy to evaluate and treat for Language, Swallowing and Cognition. and  to evaluate for community resources/long-range planning.    MISCELLANEOUS CARE:  Diabetes Care:   Fingerstick blood sugar AC and HS    WOUND CARE ORDERS  None    Medications: Review discharge medications with patient and family and provide education.      Current Discharge Medication List      START taking these medications    Details   doxycycline (VIBRA-TABS) 100 MG tablet Take 1 tablet (100 mg total) by mouth every 12 (twelve) hours.  Qty: 2 tablet, Refills: 0      pantoprazole (PROTONIX) 40 mg GrPS Take 1 packet (40 mg total) by mouth once daily.  Qty: 30 packet, Refills: 11      QUEtiapine (SEROQUEL) 25 MG Tab Take 1 tablet (25 mg total) by mouth nightly as needed (unredirectable agitation).  Qty: 30 tablet, Refills: 0      ramelteon (ROZEREM) 8 mg tablet Take 1 tablet (8 mg total) by mouth nightly as needed for  Insomnia.  Qty: 30 tablet, Refills: 0      sodium chloride 0.9% SolP 100 mL with iron sucrose 100 mg iron/5 mL Soln 100 mg Inject 100 mg into the vein 3 (three) times a week.  Qty: 1200 mL, Refills: 0         CONTINUE these medications which have CHANGED    Details   !! allopurinol (ZYLOPRIM) 100 MG tablet Take 1 tablet (100 mg total) by mouth once daily.  Qty: 90 tablet, Refills: 1      carvedilol (COREG) 25 MG tablet Take 1 tablet (25 mg total) by mouth 2 (two) times daily with meals.  Qty: 60 tablet, Refills: 11       losartan (COZAAR) 100 MG tablet Take 1 tablet (100 mg total) by mouth every evening.  Qty: 90 tablet, Refills: 3           Associated Diagnoses: Essential hypertension      !! warfarin (COUMADIN) 5 MG tablet Take 1 tablet (5 mg total) by mouth every Wednesday.  Qty: 4 tablet, Refills: 11      !! warfarin (COUMADIN) 5 MG tablet Take 1 tablet (5 mg total) by mouth every Sunday.  Qty: 4 tablet, Refills: 11      !! warfarin (COUMADIN) 5 MG tablet Take 1 tablet (5 mg total) by mouth every Friday  Qty: 1 tablet, Refills: 11      !! warfarin (COUMADIN) 7.5 MG tablet Take 1 tablet (7.5 mg total) by mouth every Monday, Tuesday, Thursday, Saturday.  Qty: 16 tablet, Refills: 11       !! - Potential duplicate medications found. Please discuss with provider.      CONTINUE these medications which have NOT CHANGED    Details          amLODIPine (NORVASC) 10 MG tablet Take 10 mg by mouth once daily.      aspirin (ECOTRIN) 81 MG EC tablet Take 81 mg by mouth once daily.        atorvastatin (LIPITOR) 80 MG tablet TAKE 1 TABLET (80 MG TOTAL) BY MOUTH ONCE DAILY.  Qty: 90 tablet, Refills: 3    Associated Diagnoses: Hyperlipidemia, unspecified hyperlipidemia type      budesonide-formoterol 160-4.5 mcg (SYMBICORT) 160-4.5 mcg/actuation HFAA Inhale 2 puffs into the lungs every 12 (twelve) hours. Controller  Qty: 3 Inhaler, Refills: 4    Associated Diagnoses: Mild persistent asthma without complication      celecoxib  (CELEBREX) 200 MG capsule Take 1 capsule (200 mg total) by mouth once daily.  Qty: 30 capsule, Refills: 3      finasteride (PROSCAR) 5 mg tablet TAKE 1 TABLET ONCE DAILY.  Qty: 90 tablet, Refills: 3    Associated Diagnoses: Benign non-nodular prostatic hyperplasia without lower urinary tract symptoms      fluticasone (FLONASE) 50 mcg/actuation nasal spray 2 sprays (100 mcg total) by Each Nare route once daily.  Qty: 3 Bottle, Refills: 3    Associated Diagnoses: Mild persistent asthma without complication      gabapentin (NEURONTIN) 300 MG capsule Take 1 capsule (300 mg total) by mouth every evening.  Qty: 90 capsule, Refills: 3    Associated Diagnoses: Neck sprain, initial encounter; Closed displaced fracture of nasal bone, initial encounter      montelukast (SINGULAIR) 10 mg tablet Take 10 mg by mouth every evening.      NITROSTAT 0.4 mg SL tablet PLACE 1 TABLET (0.4 MG TOTAL) UNDER THE TONGUE EVERY 5 (FIVE) MINUTES AS NEEDED FOR CHEST PAIN.  Qty: 25 tablet, Refills: 3    Associated Diagnoses: Abnormal cardiovascular stress test; Angina of effort      tamsulosin (FLOMAX) 0.4 mg Cap TAKE 1 CAPSULE BY MOUTH EVERY DAY  Qty: 30 capsule, Refills: 2    Associated Diagnoses: BPH with obstruction/lower urinary tract symptoms      tiotropium bromide (SPIRIVA RESPIMAT) 1.25 mcg/actuation Mist Inhale 2.5 mcg into the lungs once daily. Controller  Qty: 4 g, Refills: 5    Associated Diagnoses: Mild asthma with acute exacerbation, unspecified whether persistent      torsemide (DEMADEX) 20 MG Tab Take 1 tablet (20 mg total) by mouth 2 (two) times daily.  Qty: 60 tablet, Refills: 1      albuterol (PROVENTIL/VENTOLIN HFA) 90 mcg/actuation inhaler 2 puffs every 4 hours as needed for cough, wheeze, or shortness of breath  Qty: 3 Inhaler, Refills: 3    Associated Diagnoses: Mild persistent asthma without complication      albuterol-ipratropium (DUO-NEB) 2.5 mg-0.5 mg/3 mL nebulizer solution Take 3 mLs by nebulization every 6 (six)  hours as needed for Wheezing or Shortness of Breath.  Qty: 270 mL, Refills: 0    Associated Diagnoses: Mild persistent asthma with acute exacerbation       !! - Potential duplicate medications found. Please discuss with provider.      STOP taking these medications       isosorbide mononitrate (ISMO,MONOKET) 10 mg tablet Comments:   Reason for Stopping:         meclizine (ANTIVERT) 25 mg tablet Comments:   Reason for Stopping:         mirtazapine (REMERON) 7.5 MG Tab Comments:   Reason for Stopping:         omeprazole (PRILOSEC) 20 MG capsule Comments:   Reason for Stopping:         zolpidem (AMBIEN) 5 MG Tab Comments:   Reason for Stopping:                    _________________________________  Tmamy Saucedo NP  04/08/2019

## 2019-04-08 NOTE — PLAN OF CARE
Problem: Adult Inpatient Plan of Care  Goal: Plan of Care Review  Past Medical History:  7/29/2016: NICK (acute kidney injury)  No date: Allergy  12/15/2014: Anemia, mild  No date: Arthritis      Comment:  Gout  3/27/2012: Benign essential HTN  5/10/2018: BMI 29.0-29.9,adult  No date: BPH (benign prostatic hyperplasia)  No date: BPH (benign prostatic hyperplasia)  2006: CAD (coronary artery disease)  No date: Chronic kidney disease      Comment:  due to ibuprofen  No date: Colon polyp  No date: CRF (chronic renal failure), stage 5  No date: Diverticulosis  No date: Gastritis  No date: GERD (gastroesophageal reflux disease)  No date: Gout  5/3/2018: History of colon polyps  3/27/2012: HTN (hypertension)  No date: Hyperlipidemia  No date: Hyperlipidemia  6/14/2018: LLL pneumonia  No date: LVH (left ventricular hypertrophy)  No date: Mesenteric ischemia  3/27/2012: Murmur, cardiac  No date: GRICELDA (obstructive sleep apnea)      Comment:  DOES NOT USE A MACHINE  No date: Sinus problem  No date: Syncope and collapse      Past Surgical History:  No date: APPENDECTOMY  5/31/2016: BLOCK-NERVE; Left      Comment:  Performed by Kevin Leon MD at [facility] OR  No date: CARDIAC CATHETERIZATION  2011: COLONOSCOPY  5/3/2018: COLONOSCOPY; N/A      Comment:  Performed by Messi Harris MD at [facility] ENDO  9/10/2015: COLONOSCOPY; N/A      Comment:  Performed by Messi Harris MD at [facility] ENDO  4/2007: CORONARY ARTERY BYPASS GRAFT      Comment:  x 1  8/30/2017: CYSTOSCOPY; N/A      Comment:  Performed by Rudy Herring MD at [facility] OR  11/10/2015: CYSTOSCOPY; N/A      Comment:  Performed by Rudy Herring MD at [facility] OR  1/4/2016: ESOPHAGOGASTRODUODENOSCOPY (EGD); N/A      Comment:  Performed by Tra Brooks MD at [facility] ENDO (2ND FLR)  10/15/2014: ESOPHAGOGASTRODUODENOSCOPY (EGD); N/A      Comment:  Performed by Messi Harris MD at [facility] ENDO  2/25/2016: INJECTION-STEROID-EPIDURAL-TRANSFORAMINAL; Left      Comment:  Performed by  Kevin Leon MD at [facility] OR  No date: JOINT REPLACEMENT      Comment:  left knee total replacement  X 3  No date: mid leftt finger      Comment:  from a cactelkin  2016: MOLE REMOVAL  4/11/2016: RADIOFREQUENCY THERMOCOAGULATION (RFTC)-NERVE-MEDIAN   BRANCH-LUMBAR; Left      Comment:  Performed by Kevin Leon MD at [facility] OR  No date: rotative cuff      Comment:  no rotative cuffs on bilat shoulders has pins   No date: SHOULDER SURGERY      Comment:  shoulder surgery bilat  RIGHT X 4; LEFT X 3  11/10/2015: TRANSRECTAL ULTRASOUND GUIDED PROSTATE BIOPSY; Bilateral      Comment:  Performed by Rudy Herring MD at [facility] OR  5/31/2017: ULTRASOUND-ENDOSCOPIC-UPPER; N/A      Comment:  Performed by Tra Brooks MD at [facility] ENDO (2ND FLR)  1/4/2016: ULTRASOUND-ENDOSCOPIC-UPPER; N/A      Comment:  Performed by Tra Brooks MD at [facility] ENDO (2ND FLR)  1/16/2015: ULTRASOUND-ENDOSCOPIC-UPPER; N/A      Comment:  Performed by Jason Saleem MD at [facility] ENDO (2ND FLR)    Patient likes blankets on.    Outcome: Ongoing (interventions implemented as appropriate)  Denies needs. No acute distress noted. Refer to flow sheets for full assessment.

## 2019-04-08 NOTE — PLAN OF CARE
04/08/19 1129   Post-Acute Status   Post-Acute Authorization Placement   Post-Acute Placement Status Referrals Sent       Referral sent to [facility] through [facility]. Awaiting acceptance.  SW in contact with CM and Medical staff. Will continue to follow and offer support as needed.     Braden Gomez, PATRICK  [facility]   Ext. [phone]

## 2019-04-08 NOTE — PLAN OF CARE
Problem: Adult Inpatient Plan of Care  Goal: Plan of Care Review  Past Medical History:  7/29/2016: NICK (acute kidney injury)  No date: Allergy  12/15/2014: Anemia, mild  No date: Arthritis      Comment:  Gout  3/27/2012: Benign essential HTN  5/10/2018: BMI 29.0-29.9,adult  No date: BPH (benign prostatic hyperplasia)  No date: BPH (benign prostatic hyperplasia)  2006: CAD (coronary artery disease)  No date: Chronic kidney disease      Comment:  due to ibuprofen  No date: Colon polyp  No date: CRF (chronic renal failure), stage 5  No date: Diverticulosis  No date: Gastritis  No date: GERD (gastroesophageal reflux disease)  No date: Gout  5/3/2018: History of colon polyps  3/27/2012: HTN (hypertension)  No date: Hyperlipidemia  No date: Hyperlipidemia  6/14/2018: LLL pneumonia  No date: LVH (left ventricular hypertrophy)  No date: Mesenteric ischemia  3/27/2012: Murmur, cardiac  No date: GRICELDA (obstructive sleep apnea)      Comment:  DOES NOT USE A MACHINE  No date: Sinus problem  No date: Syncope and collapse      Past Surgical History:  No date: APPENDECTOMY  5/31/2016: BLOCK-NERVE; Left      Comment:  Performed by Kevin Leon MD at [facility] OR  No date: CARDIAC CATHETERIZATION  2011: COLONOSCOPY  5/3/2018: COLONOSCOPY; N/A      Comment:  Performed by Messi Harris MD at [facility] ENDO  9/10/2015: COLONOSCOPY; N/A      Comment:  Performed by Messi Harris MD at [facility] ENDO  4/2007: CORONARY ARTERY BYPASS GRAFT      Comment:  x 1  8/30/2017: CYSTOSCOPY; N/A      Comment:  Performed by Rudy Herring MD at [facility] OR  11/10/2015: CYSTOSCOPY; N/A      Comment:  Performed by Rudy Herring MD at [facility] OR  1/4/2016: ESOPHAGOGASTRODUODENOSCOPY (EGD); N/A      Comment:  Performed by Tra Brooks MD at [facility] ENDO (2ND FLR)  10/15/2014: ESOPHAGOGASTRODUODENOSCOPY (EGD); N/A      Comment:  Performed by Messi Harris MD at [facility] ENDO  2/25/2016: INJECTION-STEROID-EPIDURAL-TRANSFORAMINAL; Left      Comment:  Performed by  Kevin Leon MD at [facility] OR  No date: JOINT REPLACEMENT      Comment:  left knee total replacement  X 3  No date: mid leftt finger      Comment:  from a sony  2016: MOLE REMOVAL  4/11/2016: RADIOFREQUENCY THERMOCOAGULATION (RFTC)-NERVE-MEDIAN   BRANCH-LUMBAR; Left      Comment:  Performed by Kevin Leon MD at [facility] OR  No date: rotative cuff      Comment:  no rotative cuffs on bilat shoulders has pins   No date: SHOULDER SURGERY      Comment:  shoulder surgery bilat  RIGHT X 4; LEFT X 3  11/10/2015: TRANSRECTAL ULTRASOUND GUIDED PROSTATE BIOPSY; Bilateral      Comment:  Performed by Rudy Herring MD at [facility] OR  5/31/2017: ULTRASOUND-ENDOSCOPIC-UPPER; N/A      Comment:  Performed by Tra Brooks MD at [facility] ENDO (2ND FLR)  1/4/2016: ULTRASOUND-ENDOSCOPIC-UPPER; N/A      Comment:  Performed by Tra Brooks MD at [facility] ENDO (2ND FLR)  1/16/2015: ULTRASOUND-ENDOSCOPIC-UPPER; N/A      Comment:  Performed by Jason Saleem MD at [facility] ENDO (2ND FLR)    Patient likes blankets on.    Outcome: Ongoing (interventions implemented as appropriate)  Hemodialysis tx complete, 2L removed in 3.5 hour tx, tolerated well. Blood returned via CHRISTO AVF, 15g needles removed x2, gauze and tape applied, pressure held for 5 minutes to each site, hemostasis achieved

## 2019-04-08 NOTE — PT/OT/SLP PROGRESS
Speech Language Pathology      Micheal Hunt  MRN: [id number]    Patient not seen today secondary to dialysis. Will follow-up 4/9.    Ayana Borja MA, CCC-SLP

## 2019-04-08 NOTE — PROGRESS NOTES
PM&R consult follow up.  Please see original consult for detailed note.      Attempted follow-up visit today and Friday; however, patient in HD.  Recommend SNF at this time.  As patient shows progress with therapy, he may be evaluated for potential IRF transfer.  Facility per patient/family preference.  Will follow for change in post-acute recommendation.    ALLISON Del Angel, FNP-C  Physical Medicine & Rehabilitation   04/08/2019  Spectralink: [id number]

## 2019-04-09 VITALS
RESPIRATION RATE: 18 BRPM | WEIGHT: 235 LBS | OXYGEN SATURATION: 94 % | DIASTOLIC BLOOD PRESSURE: 56 MMHG | SYSTOLIC BLOOD PRESSURE: 118 MMHG | BODY MASS INDEX: 31.14 KG/M2 | TEMPERATURE: 98 F | HEIGHT: 73 IN | HEART RATE: 65 BPM

## 2019-04-09 LAB
ALBUMIN SERPL BCP-MCNC: 3 G/DL (ref 3.5–5.2)
ALP SERPL-CCNC: 112 U/L (ref 55–135)
ALT SERPL W/O P-5'-P-CCNC: 22 U/L (ref 10–44)
ANION GAP SERPL CALC-SCNC: 12 MMOL/L (ref 8–16)
AST SERPL-CCNC: 23 U/L (ref 10–40)
BASOPHILS # BLD AUTO: 0.03 K/UL (ref 0–0.2)
BASOPHILS NFR BLD: 0.5 % (ref 0–1.9)
BILIRUB SERPL-MCNC: 1.2 MG/DL (ref 0.1–1)
BUN SERPL-MCNC: 33 MG/DL (ref 8–23)
CALCIUM SERPL-MCNC: 8.3 MG/DL (ref 8.7–10.5)
CHLORIDE SERPL-SCNC: 102 MMOL/L (ref 95–110)
CO2 SERPL-SCNC: 23 MMOL/L (ref 23–29)
CREAT SERPL-MCNC: 4.3 MG/DL (ref 0.5–1.4)
DIFFERENTIAL METHOD: ABNORMAL
EOSINOPHIL # BLD AUTO: 0.1 K/UL (ref 0–0.5)
EOSINOPHIL NFR BLD: 2.3 % (ref 0–8)
ERYTHROCYTE [DISTWIDTH] IN BLOOD BY AUTOMATED COUNT: 15.8 % (ref 11.5–14.5)
EST. GFR  (AFRICAN AMERICAN): 14.1 ML/MIN/1.73 M^2
EST. GFR  (NON AFRICAN AMERICAN): 12.2 ML/MIN/1.73 M^2
GLUCOSE SERPL-MCNC: 78 MG/DL (ref 70–110)
HCT VFR BLD AUTO: 29.5 % (ref 40–54)
HGB BLD-MCNC: 9.5 G/DL (ref 14–18)
IMM GRANULOCYTES # BLD AUTO: 0.05 K/UL (ref 0–0.04)
IMM GRANULOCYTES NFR BLD AUTO: 0.9 % (ref 0–0.5)
INR PPP: 2.1 (ref 0.8–1.2)
LYMPHOCYTES # BLD AUTO: 2.5 K/UL (ref 1–4.8)
LYMPHOCYTES NFR BLD: 43.2 % (ref 18–48)
MCH RBC QN AUTO: 31.5 PG (ref 27–31)
MCHC RBC AUTO-ENTMCNC: 32.2 G/DL (ref 32–36)
MCV RBC AUTO: 98 FL (ref 82–98)
MONOCYTES # BLD AUTO: 0.7 K/UL (ref 0.3–1)
MONOCYTES NFR BLD: 12.3 % (ref 4–15)
NEUTROPHILS # BLD AUTO: 2.4 K/UL (ref 1.8–7.7)
NEUTROPHILS NFR BLD: 40.8 % (ref 38–73)
NRBC BLD-RTO: 0 /100 WBC
PHOSPHATE SERPL-MCNC: 3.3 MG/DL (ref 2.7–4.5)
PLATELET # BLD AUTO: 190 K/UL (ref 150–350)
PMV BLD AUTO: 9.7 FL (ref 9.2–12.9)
POCT GLUCOSE: 129 MG/DL (ref 70–110)
POCT GLUCOSE: 93 MG/DL (ref 70–110)
POTASSIUM SERPL-SCNC: 4.1 MMOL/L (ref 3.5–5.1)
PROT SERPL-MCNC: 6 G/DL (ref 6–8.4)
PROTHROMBIN TIME: 20.8 SEC (ref 9–12.5)
RBC # BLD AUTO: 3.02 M/UL (ref 4.6–6.2)
SODIUM SERPL-SCNC: 137 MMOL/L (ref 136–145)
WBC # BLD AUTO: 5.77 K/UL (ref 3.9–12.7)

## 2019-04-09 PROCEDURE — 80053 COMPREHEN METABOLIC PANEL: CPT

## 2019-04-09 PROCEDURE — 85025 COMPLETE CBC W/AUTO DIFF WBC: CPT

## 2019-04-09 PROCEDURE — 99233 PR SUBSEQUENT HOSPITAL CARE,LEVL III: ICD-10-PCS | Mod: GC,,, | Performed by: PSYCHIATRY & NEUROLOGY

## 2019-04-09 PROCEDURE — 25000003 PHARM REV CODE 250: Performed by: INTERNAL MEDICINE

## 2019-04-09 PROCEDURE — 25000003 PHARM REV CODE 250: Performed by: NURSE PRACTITIONER

## 2019-04-09 PROCEDURE — 36415 COLL VENOUS BLD VENIPUNCTURE: CPT

## 2019-04-09 PROCEDURE — 85610 PROTHROMBIN TIME: CPT

## 2019-04-09 PROCEDURE — 25000003 PHARM REV CODE 250: Performed by: HOSPITALIST

## 2019-04-09 PROCEDURE — 84100 ASSAY OF PHOSPHORUS: CPT

## 2019-04-09 PROCEDURE — 25000003 PHARM REV CODE 250: Performed by: STUDENT IN AN ORGANIZED HEALTH CARE EDUCATION/TRAINING PROGRAM

## 2019-04-09 PROCEDURE — 99233 SBSQ HOSP IP/OBS HIGH 50: CPT | Mod: GC,,, | Performed by: PSYCHIATRY & NEUROLOGY

## 2019-04-09 RX ORDER — FERROUS SULFATE 325(65) MG
325 TABLET ORAL
Refills: 0 | COMMUNITY
Start: 2019-04-10 | End: 2019-10-11 | Stop reason: ALTCHOICE

## 2019-04-09 RX ADMIN — FINASTERIDE 5 MG: 5 TABLET, FILM COATED ORAL at 10:04

## 2019-04-09 RX ADMIN — ALLOPURINOL 100 MG: 100 TABLET ORAL at 10:04

## 2019-04-09 RX ADMIN — DOXYCYCLINE HYCLATE 100 MG: 100 TABLET, COATED ORAL at 10:04

## 2019-04-09 RX ADMIN — CARVEDILOL 25 MG: 25 TABLET, FILM COATED ORAL at 07:04

## 2019-04-09 RX ADMIN — INSULIN DETEMIR 8 UNITS: 100 INJECTION, SOLUTION SUBCUTANEOUS at 11:04

## 2019-04-09 RX ADMIN — TORSEMIDE 20 MG: 20 TABLET ORAL at 10:04

## 2019-04-09 RX ADMIN — PANTOPRAZOLE SODIUM 40 MG: 40 GRANULE, DELAYED RELEASE ORAL at 10:04

## 2019-04-09 NOTE — PLAN OF CARE
04/09/2019      Micheal Hunt  [address]  [address]  [place] [address]          Hospital Medicine Dept.  [facility] [address] [place] [address]  [phone] after hours  [phone] fax Micheal Hunt has been hospitalized at the [facility] since 3/22/2019 and is discharging to [facility] on 4/9/19.      Please contact me if you have any questions.                  __________________________  Debbi Brewer RN  04/09/2019

## 2019-04-09 NOTE — PROGRESS NOTES
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  He has significant intracranial/extrancranial atherosclerotic disease seen on CTA head/neck. Etiology cardioembolic versus atheroembolic.      - Anticoagulants: Coumadin daily adjusting for INR goal of 2.0-3.0   - Statins: Atorvastatin- 80 mg daily  -Diagnostic studies pending: INR   - Aggressive risk factor modification: HTN, DM, HLD, A-Fib, CAD, intracranial atherosclerosis   - Rehab efforts: PT/OT to evaluate and treat ->recommending Rehab, SLP -> recommending Regular diet/nectar thick  - VTE prophylaxis: anticoagulated  - BP parameters: target sBP<160      Umbilical hernia  + abdominal pain during hospitalization  CT scan ordered showed umbilical hernia  General surgery consulted and recommended outpatient follow up     Cytotoxic cerebral edema  Large area of cytotoxic cerebral edema identified when reviewing brain imaging in the territory of the left posterior cerebral artery. There is mass effect associated with it. We will continue to monitor the patients clinical exam for any worsening of symptoms which may indicate expansion of the stroke or the area of the edema resulting in the clinical change. The pattern is suggestive of atheroembolic etiology.        Groin hematoma  Monitoring daily,continue to worsen  Stat right femoral arterial ultrasound ordered and negative for vasculature abnormalities   Soft - continue to monitor     Dysarthria due to acute cerebrovascular accident (CVA)  Continued SLP  - recommend renal diet, cardiac and diabetic with nectar thick   When awake     Cellulitis of right arm  R forearm edema   De-escalated at this time and on doxy last dose will be 04/09  Resolved    Patent foramen  ovale  Risk factor for stroke  Seen on echocardiogram     Paroxysmal atrial fibrillation  Risk factor for stroke   CHADsVASc 7    - Carvedilol for rate control   -coumadin daily     ESRD (end stage renal disease) on dialysis  Patient is being followed by nephrology, appreciate their assistance  Continue scheduled HD, MWF    NSTEMI (non-ST elevated myocardial infarction)  Seen by cards on 3/25/19   Continue medical management   Does not complain of chest pain at this time     Type 2 diabetes mellitus, without long-term current use of insulin  Poorly controlled, A1C 8.3  Current glucose running ~100-150  Detemir 8 units daily  MD SSI  POCt glucose q6 hr    Long term (current) use of anticoagulants  - coumadin daily  INR 2.1 today     Benign prostatic hyperplasia without lower urinary tract symptoms  H/o and CT showed enlarged prostate   - continue flomax and finasteride  - hematuria-resolved     Anemia of chronic disease  Due to ESRD   receives EPO every 2 weeks   H/h continues to trend down.  Blood transfusion 03/31 1unit  Stable currently   Hematuria resolved       Obstructive sleep apnea  Sleep study in 2014  CONCLUSION:  Nocturnal polysomnography demonstrates:  1.  Obstructive sleep apnea to be present with 44.2 events an hour with   desaturation to 81%.  2.  Sinus rhythm with occasional PVC.    Apparently lost to follow up- per daughter, reported that no one ever called to tell him report   Snoring in the hospital overnight   Plan to try cpap in hospital -can take off if unable to tolerate   Stroke risk factor     Gout, arthritis  - continue allopurinol    CAD (coronary artery disease), 2V CABG 2007  Risk factor for stroke  Continue statin.       Hyperlipidemia  LDL 46  Continue atorvastatin 80 mg due to extensive intracranial atherosclerotic disease      Essential hypertension  Risk factor for stroke   SBP<160  continue Torsemide 20 mg q12, losartan 100 mg qd, and Carvedilol 25 mg q12        Carotid artery  stenosis  Noted CTA and carotid US  Patient evaluated by vascular surgery, recommended maximal medical management         Admitted to hospital medicine for unstable angina eval, vascular neurology consulted for right sided weakness, facial droop and dysarthria, MRI showed L PCA infarction, not candidate for TPA ( symptoms duration >4 hours)patient symptoms improved with laying flat, admitted to neuro critical care. Patient with known PCA infarct on L with undulating symptom pattern with modification to level of recumbency. Patient restarted on heparin gtt. Underwent US of LE and of carotid with evidence of <50% L ICA stenosis and 60-70% R ICA stenosis, heavily calcified on CTA, additionally L vertebral also heavily calcified. Patient tolerated HD. Neuro deficit appear to be more severe on 3/27 with decreasing right arm strength now 2/5 and with worsening dysarthria/aphasia. Patient with gross swelling of R forearm - tense, pulses intact.   03/29/2019 patient with worsening of neurological deficit, unable to move right upper extremity, patient with pain on passive movement of right fingers exhibited by facial grimace as verbal response to pain/sensation/light touch no longer reliable, aphasia/dysarthria worsening, RLE with 2/5 strength today, patient evaluated by orthopedic surgery regarding R forearm yesterday and today and feel that this is not compartment syndrome and will continue to monitor, distal pulses remain palpable  3/30: NAEON. On heparin gtt. Plans to step down  3/31: stepped down to VN without major events. transfused with pRBC x1 due to drop in Hbg. Discontinued heparin gtt. INR increased to 1.2, increased warfarin to 7.5  04/01/19 Visited patient in dialysis today.  On coumadin and atorvastatin for secondary stroke prevention H/H stabilized at 7.6 after 1 unit pRBC transfusion on 03/31.  Epo given during dialysis.  Patient has worsening right groin hematoma.  Stat right femoral artery u/s ordered.  On  "clindamycin for suspected right arm cellulitis.  Sister-in-law concern for urination.  Patient was incontinent and making urine but today has not had any urination.  U/A and bladder scan ordered   04/02/19 hospital medicine consulted for multiple medical issues. Right arm edema worsening.  Switching antibiotics from oral to IV     4/3/19 - drowsy today, less interactive. Plan for dialysis today. Noted to have gross hematuria with clots , bladder scan 315.   4/4/19 - will place condom catheter for hematuria monitoring, H&H stable. Hospital medicine helping with cellulitis. cpap ordered for night  4/5/19 - dialysis today, agitated overnight and throughout the day. Appears angry telling his family today "leave me alone, go home". Will plan for telesitter, pharmacologic therapy ordered prn tonight, with plans to involve psych if not able to control as at this time its causing him to be less interactive with therapy   04/06 Patient agitation improved with Seroquel and ramelteon.  INR increased to 2.8.  Coumadin decreased from 7.5mg to 5mg qd.  Will continue to monitor daily.  Edema in left arm improving, will continue doxycycline for cellulitis   04/07/19 Right arm edema and redness improving.  Last dose of doxycycline will be 04/09.  INR 2.5.  Patient stable for discharge   04/08/19: No acute events overnight. Patient medically ready for [id number]/09/19: Plan for discharge to rehab today      STROKE DOCUMENTATION        NIH Scale:  1a. Level of Consciousness: 2-->Not alert, requires repeated stimulation to attend, or is obtunded and requires strong or painful stimulation to make movements (not stereotyped)  1b. LOC Questions: 1-->Answers one question correctly  1c. LOC Commands: 0-->Performs both tasks correctly  2. Best Gaze: 0-->Normal  3. Visual: 2-->Complete hemianopia  4. Facial Palsy: 0-->Normal symmetrical movements  5a. Motor Arm, Left: 1-->Drift, limb holds 90 (or 45) degrees, but drifts down before full 10 " seconds, does not hit bed or other support  5b. Motor Arm, Right: 3-->No effort against gravity, limb falls  6a. Motor Leg, Left: 1-->Drift, leg falls by the end of the 5-sec period but does not hit bed  6b. Motor Leg, Right: 3-->No effort against gravity, leg falls to bed immediately  7. Limb Ataxia: 0-->Absent  8. Sensory: 1-->Mild-to-moderate sensory loss, patient feels pinprick is less sharp or is dull on the affected side, or there is a loss of superficial pain with pinprick, but patient is aware of being touched  9. Best Language: 2-->Severe aphasia, all communication is through fragmentary expression, great need for inference, questioning, and guessing by the listener. Range of information that can be exchanged is limited, listener carries burden of. . . (see row details)  10. Dysarthria: 1-->Mild-to-moderate dysarthria, patient slurs at least some words and, at worst, can be understood with some difficulty  11. Extinction and Inattention (formerly Neglect): 0-->No abnormality  Total (NIH Stroke Scale): 17       Modified Steve Score: 4  Sachin Coma Scale:    ABCD2 Score:    [id number] Score:8  HAS -BLED Score:   ICH Score:   Hunt & Sharp Classification:      Hemorrhagic change of an Ischemic Stroke: Does this patient have an ischemic stroke with hemorrhagic changes? No     Neurologic Chief Complaint: RSW + difficulty with speech    Subjective:     Interval History: Plan for discharge to rehab today.    HPI, Past Medical, Family, and Social History remains the same as documented in the initial encounter.     Review of Systems   Constitutional: Positive for fatigue. Negative for fever.   Eyes: Positive for visual disturbance.   Neurological: Positive for facial asymmetry, speech difficulty and weakness.     Scheduled Meds:   allopurinol  100 mg Oral Daily    atorvastatin  80 mg Oral Daily    carvedilol  25 mg Oral BID WM    doxycycline  100 mg Oral Q12H    finasteride  5 mg Oral Daily    gabapentin   300 mg Oral QHS    insulin detemir U-100  8 Units Subcutaneous Daily    iron sucrose (VENOFER) IVPB  100 mg Intravenous Once per day on Mon Wed Fri    losartan  100 mg Oral QHS    pantoprazole  40 mg Oral Daily    polyethylene glycol  17 g Oral Daily    senna-docusate 8.6-50 mg  1 tablet Oral BID    tamsulosin  0.4 mg Oral Daily    torsemide  20 mg Oral BID    [START ON 4/14/2019] warfarin  5 mg Oral Every Sun    [START ON 4/10/2019] warfarin  5 mg Oral Every Wed    [START ON 4/12/2019] warfarin  5 mg Oral Every Fri    warfarin  7.5 mg Oral Every Mon, Tues, Thurs, Sat     Continuous Infusions:    PRN Meds:sodium chloride, sodium chloride 0.9%, acetaminophen, albuterol-ipratropium, dextrose 50%, dextrose 50%, glucagon (human recombinant), glucose, glucose, insulin aspart U-100, nitroGLYCERIN, ondansetron, QUEtiapine, ramelteon    Objective:     Vital Signs (Most Recent):  Temp: 97.6 °F (36.4 °C) (04/09/19 1204)  Pulse: 65 (04/09/19 1204)  Resp: 18 (04/09/19 1204)  BP: (!) 118/56 (04/09/19 1204)  SpO2: (!) 94 % (04/09/19 1204)  BP Location: Right arm    Vital Signs Range (Last 24H):  Temp:  [97.6 °F (36.4 °C)-98.8 °F (37.1 °C)]   Pulse:  [59-76]   Resp:  [14-18]   BP: (110-141)/(56-79)   SpO2:  [94 %-98 %]   BP Location: Right arm    Physical Exam   Constitutional: He appears well-developed and well-nourished. No distress.   HENT:   Head: Normocephalic and atraumatic.   Cardiovascular: Normal rate.   Pulmonary/Chest: Effort normal. No accessory muscle usage. No respiratory distress.   Genitourinary:   Genitourinary Comments: Hematoma in the R groin- improved   Skin: Skin is warm. He is not diaphoretic.   Nursing note and vitals reviewed.    Neurological Exam:   LOC: drowsy- arouses to voice  Language and articulation:expressive (nods appropriately) and receptive  Visual Fields: no blink to threat right    Decreased sensation on right   Motor: right upper extremity 1/5 , RLE 2/5  Left side 4/5        Laboratory:  Recent Results (from the past 24 hour(s))   POCT glucose    Collection Time: 04/08/19  4:31 PM   Result Value Ref Range    POCT Glucose 102 70 - 110 mg/dL   POCT glucose    Collection Time: 04/08/19  8:42 PM   Result Value Ref Range    POCT Glucose 117 (H) 70 - 110 mg/dL   Comprehensive metabolic panel    Collection Time: 04/09/19  5:00 AM   Result Value Ref Range    Sodium 137 136 - 145 mmol/L    Potassium 4.1 3.5 - 5.1 mmol/L    Chloride 102 95 - 110 mmol/L    CO2 23 23 - 29 mmol/L    Glucose 78 70 - 110 mg/dL    BUN, Bld 33 (H) 8 - 23 mg/dL    Creatinine 4.3 (H) 0.5 - 1.4 mg/dL    Calcium 8.3 (L) 8.7 - 10.5 mg/dL    Total Protein 6.0 6.0 - 8.4 g/dL    Albumin 3.0 (L) 3.5 - 5.2 g/dL    Total Bilirubin 1.2 (H) 0.1 - 1.0 mg/dL    Alkaline Phosphatase 112 55 - 135 U/L    AST 23 10 - 40 U/L    ALT 22 10 - 44 U/L    Anion Gap 12 8 - 16 mmol/L    eGFR if African American 14.1 (A) >60 mL/min/1.73 m^2    eGFR if non  12.2 (A) >60 mL/min/1.73 m^2   Protime-INR    Collection Time: 04/09/19  5:00 AM   Result Value Ref Range    Prothrombin Time 20.8 (H) 9.0 - 12.5 sec    INR 2.1 (H) 0.8 - 1.2   CBC auto differential    Collection Time: 04/09/19  5:00 AM   Result Value Ref Range    WBC 5.77 3.90 - 12.70 K/uL    RBC 3.02 (L) 4.60 - 6.20 M/uL    Hemoglobin 9.5 (L) 14.0 - 18.0 g/dL    Hematocrit 29.5 (L) 40.0 - 54.0 %    MCV 98 82 - 98 fL    MCH 31.5 (H) 27.0 - 31.0 pg    MCHC 32.2 32.0 - 36.0 g/dL    RDW 15.8 (H) 11.5 - 14.5 %    Platelets 190 150 - 350 K/uL    MPV 9.7 9.2 - 12.9 fL    Immature Granulocytes 0.9 (H) 0.0 - 0.5 %    Gran # (ANC) 2.4 1.8 - 7.7 K/uL    Immature Grans (Abs) 0.05 (H) 0.00 - 0.04 K/uL    Lymph # 2.5 1.0 - 4.8 K/uL    Mono # 0.7 0.3 - 1.0 K/uL    Eos # 0.1 0.0 - 0.5 K/uL    Baso # 0.03 0.00 - 0.20 K/uL    nRBC 0 0 /100 WBC    Gran% 40.8 38.0 - 73.0 %    Lymph% 43.2 18.0 - 48.0 %    Mono% 12.3 4.0 - 15.0 %    Eosinophil% 2.3 0.0 - 8.0 %    Basophil% 0.5 0.0 - 1.9 %     Differential Method Automated    Phosphorus    Collection Time: 04/09/19  5:00 AM   Result Value Ref Range    Phosphorus 3.3 2.7 - 4.5 mg/dL   POCT glucose    Collection Time: 04/09/19  8:01 AM   Result Value Ref Range    POCT Glucose 93 70 - 110 mg/dL   POCT glucose    Collection Time: 04/09/19 11:12 AM   Result Value Ref Range    POCT Glucose 129 (H) 70 - 110 mg/dL       Diagnostic Results     Brain Imaging   CTH non-contrast (3/28/2019):   Evolving subacute to chronic appearing infarcts in L thalamus and L PCA territory  small remote lacune in R putamen.  Cytotoxic cerebral edema    MRI Brain (3/26/19):  New acute left PCA territory distribution infarctions involving L thalamus and L paramedian occipital lobe, as well as L splenium of the corpus callosum   Generalized cerebral volume loss   significant chronic microvascular ischemic disease.      MRI Brain (3/24/19):  Vascular findings similar to CTH and CTA    Vessel Imaging   CTA-MP (3/24/19):  Extensive intracranial atherosclerotic disease:  Left PCA with a high-grade stenosis. High-grade (>70%) stenosis right proximal ICA and moderate (50-70%) stenosis left proximal ICA. Moderate to high-grade stenosis of the intra cavernous/ supraclinoid ICAs bilaterally. High-grade stenosis distal left vertebral artery.    Cardiac Imaging   TTE (3/23/19):  EF 55%, LVH  severe bilateral atrial enlargement   PA pressure  41   PFO with left to right shunting        Tammy Saucedo NP  Comprehensive Stroke Center  Department of Vascular Neurology   [facility]

## 2019-04-09 NOTE — PLAN OF CARE
04/09/19 1000   Post-Acute Status   Post-Acute Authorization Placement   Post-Acute Placement Status Set-up Complete     Pt has been accepted by [facility]. Nurse can call report to [phone]  . Please call after 11am. Transport setup by ambulance for 1:30pm.  SW in contact with CM and Medical staff. Will continue to follow and offer support as needed.     Braden Gomez LMSW  [facility]   Ext. [phone]

## 2019-04-09 NOTE — PLAN OF CARE
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 [I63.332]  No    Umbilical hernia without obstruction and without gangrene [K42.9]  Yes    Umbilical hernia [K42.9]  Unknown    Cytotoxic cerebral edema [G93.6]  Unknown    Groin hematoma [S30.1XXA]  Unknown    Cellulitis of right arm [L03.113]  Unknown    Dysarthria due to acute cerebrovascular accident (CVA) [I63.9, R47.1]  Unknown    Encephalopathy [G93.40]  Unknown    Patent foramen ovale [Q21.1]  Not Applicable    Paroxysmal atrial fibrillation [I48.0]  Yes    ESRD (end stage renal disease) on dialysis [N18.6, Z99.2]  Not Applicable    NSTEMI (non-ST elevated myocardial infarction) [I21.4]  Yes    Type 2 diabetes mellitus, without long-term current use of insulin [E11.9]  Yes    Hypertension due to end stage renal disease caused by type 2 diabetes mellitus, on dialysis [E11.22, I12.0, Z99.2, N18.6]  Not Applicable    Pulmonary hypertension [I27.20]  Yes    Long term (current) use of anticoagulants [Z79.01]  Not Applicable    Benign prostatic hyperplasia without lower urinary tract symptoms [N40.0]  Yes    Anemia of chronic disease [D63.8]  Yes    Obstructive sleep apnea [G47.33]  Yes    Gout, arthritis [M10.9]  Yes    CAD (coronary artery disease), 2V CABG 2007 [I25.10]  Yes    Essential hypertension [I10]  Yes    Hyperlipidemia [E78.5]  Yes    Carotid artery stenosis [I65.29]  Yes      Resolved Hospital Problems    Diagnosis Date Resolved POA    Hemoglobin drop [R71.0] 04/08/2019 Unknown    CAP (community acquired pneumonia) [J18.9]  03/25/2019 Unknown    Wheezing [R06.2] 04/08/2019 Yes    SOB (shortness of breath) [R06.02] 04/08/2019 Yes    Severe persistent asthma with exacerbation [J45.51] 04/08/2019 Yes    Severe persistent asthma with acute exacerbation [J45.51] 04/08/2019 Yes    Hematuria [R31.9] 04/08/2019 No       Allergies:  Review of patient's allergies indicates:   Allergen Reactions    Ace inhibitors Other (See Comments)     Cough    Arb-angiotensin receptor antagonist Itching    Eplerenone Other (See Comments)     Marked bradycardia, 40, tiredness and weakness      Sulfa (sulfonamide antibiotics) Itching     Patient says this was 10 years ago and doesn't remember what happened       Diet: Diet Dysphagia Mechanical Soft (IDDSI Level 5); Cardiac (Low Na/Chol), Coumadin restriction, renal; Nectar thick liquids    Activities: Activity as tolerated    Nursing: Per facility protocol     Labs: PT/INR daily    CONSULTS:    Physical Therapy to evaluate and treat. , Occupational Therapy to evaluate and treat., Speech Therapy to evaluate and treat for Language, Swallowing and Cognition. and  to evaluate for community resources/long-range planning.    MISCELLANEOUS CARE:  Diabetes Care:   Report CBG < 60 or > 350 to physician.    WOUND CARE ORDERS  None    Medications: Review discharge medications with patient and family and provide education.      Current Discharge Medication List      START taking these medications    Details   doxycycline (VIBRA-TABS) 100 MG tablet Take 1 tablet (100 mg total) by mouth every 12 (twelve) hours.  Qty: 2 tablet, Refills: 0      ferrous sulfate (FEOSOL) 325 mg (65 mg iron) Tab tablet Take 1 tablet (325 mg total) by mouth twice daily three times a week. On dialysis days  Refills: 0      pantoprazole (PROTONIX) 40 mg GrPS Take 1 packet (40 mg total) by mouth once daily.  Qty: 30 packet, Refills: 11      QUEtiapine (SEROQUEL) 25 MG Tab Take 1 tablet (25 mg total) by mouth nightly as needed  (unredirectable agitation).  Qty: 30 tablet, Refills: 0      ramelteon (ROZEREM) 8 mg tablet Take 1 tablet (8 mg total) by mouth nightly as needed for Insomnia.  Qty: 30 tablet, Refills: 0   insulin detemir U-100 pen 8 Units 8 Units, SubQ, Daily                   CONTINUE these medications which have CHANGED    Details   allopurinol (ZYLOPRIM) 100 MG tablet Take 1 tablet (100 mg total) by mouth once daily.  Qty: 90 tablet, Refills: 1      carvedilol (COREG) 25 MG tablet Take 1 tablet (25 mg total) by mouth 2 (two) times daily with meals.  Qty: 60 tablet, Refills: 11      losartan (COZAAR) 100 MG tablet Take 1 tablet (100 mg total) by mouth every evening.  Qty: 90 tablet, Refills: 3           Associated Diagnoses: Essential hypertension      !! warfarin (COUMADIN) 5 MG tablet Take 1 tablet (5 mg total) by mouth every Wednesday.  Qty: 4 tablet, Refills: 11      !! warfarin (COUMADIN) 5 MG tablet Take 1 tablet (5 mg total) by mouth every Sunday.  Qty: 4 tablet, Refills: 11      !! warfarin (COUMADIN) 5 MG tablet Take 1 tablet (5 mg total) by mouth every Friday  Qty: 4 tablet, Refills: 11      !! warfarin (COUMADIN) 7.5 MG tablet Take 1 tablet (7.5 mg total) by mouth every Monday, Tuesday, Thursday, Saturday.  Qty: 16 tablet, Refills: 11       !! - Potential duplicate medications found. Please discuss with provider.      CONTINUE these medications which have NOT CHANGED    Details          amLODIPine (NORVASC) 10 MG tablet Take 10 mg by mouth once daily.      aspirin (ECOTRIN) 81 MG EC tablet Take 81 mg by mouth once daily.        atorvastatin (LIPITOR) 80 MG tablet TAKE 1 TABLET (80 MG TOTAL) BY MOUTH ONCE DAILY.  Qty: 90 tablet, Refills: 3    Associated Diagnoses: Hyperlipidemia, unspecified hyperlipidemia type      budesonide-formoterol 160-4.5 mcg (SYMBICORT) 160-4.5 mcg/actuation HFAA Inhale 2 puffs into the lungs every 12 (twelve) hours. Controller  Qty: 3 Inhaler, Refills: 4    Associated Diagnoses: Mild  persistent asthma without complication      celecoxib (CELEBREX) 200 MG capsule Take 1 capsule (200 mg total) by mouth once daily.  Qty: 30 capsule, Refills: 3      finasteride (PROSCAR) 5 mg tablet TAKE 1 TABLET ONCE DAILY.  Qty: 90 tablet, Refills: 3    Associated Diagnoses: Benign non-nodular prostatic hyperplasia without lower urinary tract symptoms      fluticasone (FLONASE) 50 mcg/actuation nasal spray 2 sprays (100 mcg total) by Each Nare route once daily.  Qty: 3 Bottle, Refills: 3    Associated Diagnoses: Mild persistent asthma without complication      gabapentin (NEURONTIN) 300 MG capsule Take 1 capsule (300 mg total) by mouth every evening.  Qty: 90 capsule, Refills: 3    Associated Diagnoses: Neck sprain, initial encounter; Closed displaced fracture of nasal bone, initial encounter      montelukast (SINGULAIR) 10 mg tablet Take 10 mg by mouth every evening.      NITROSTAT 0.4 mg SL tablet PLACE 1 TABLET (0.4 MG TOTAL) UNDER THE TONGUE EVERY 5 (FIVE) MINUTES AS NEEDED FOR CHEST PAIN.  Qty: 25 tablet, Refills: 3    Associated Diagnoses: Abnormal cardiovascular stress test; Angina of effort      tamsulosin (FLOMAX) 0.4 mg Cap TAKE 1 CAPSULE BY MOUTH EVERY DAY  Qty: 30 capsule, Refills: 2    Associated Diagnoses: BPH with obstruction/lower urinary tract symptoms      tiotropium bromide (SPIRIVA RESPIMAT) 1.25 mcg/actuation Mist Inhale 2.5 mcg into the lungs once daily. Controller  Qty: 4 g, Refills: 5    Associated Diagnoses: Mild asthma with acute exacerbation, unspecified whether persistent      torsemide (DEMADEX) 20 MG Tab Take 1 tablet (20 mg total) by mouth 2 (two) times daily.  Qty: 60 tablet, Refills: 1      albuterol (PROVENTIL/VENTOLIN HFA) 90 mcg/actuation inhaler 2 puffs every 4 hours as needed for cough, wheeze, or shortness of breath  Qty: 3 Inhaler, Refills: 3    Associated Diagnoses: Mild persistent asthma without complication      albuterol-ipratropium (DUO-NEB) 2.5 mg-0.5 mg/3 mL nebulizer  solution Take 3 mLs by nebulization every 6 (six) hours as needed for Wheezing or Shortness of Breath.  Qty: 270 mL, Refills: 0    Associated Diagnoses: Mild persistent asthma with acute exacerbation       !! - Potential duplicate medications found. Please discuss with provider.      STOP taking these medications       isosorbide mononitrate (ISMO,MONOKET) 10 mg tablet Comments:   Reason for Stopping:         meclizine (ANTIVERT) 25 mg tablet Comments:   Reason for Stopping:         mirtazapine (REMERON) 7.5 MG Tab Comments:   Reason for Stopping:         omeprazole (PRILOSEC) 20 MG capsule Comments:   Reason for Stopping:         zolpidem (AMBIEN) 5 MG Tab Comments:   Reason for Stopping:                    _________________________________  Tammy Saucedo NP  04/09/2019

## 2019-04-09 NOTE — PLAN OF CARE
Followup appts have been scheduled and AVS has been updated.      04/09/19 1423   Final Note   Assessment Type Final Discharge Note   Anticipated Discharge Disposition Rehab  ([facility])   Right Care Referral Info   Post Acute Recommendation IRF

## 2019-04-09 NOTE — PROGRESS NOTES
"[facility]  Adult Nutrition  Progress Note    SUMMARY       Recommendations    Recommendation/Intervention:     1. Continue current diet with texture per SLP.   2. If po intake <50%, recommend adding Boost Glucose Control with meals.   3. RD following.     Goals: meet >85% EEN/EPN  Nutrition Goal Status: goal met  Communication of RD Recs: (POC)    Reason for Assessment    Reason For Assessment: RD follow-up  Relevant Medical History:  HTN, HLD, obesity, T2DM on insulin, ESRD on HD, CAD s/p CABG (2007), CHF, a-fib, anemia of chronic disease  Interdisciplinary Rounds: did not attend  General Information Comments: Pt sleeping with family member at bedside. Family member reports good appetite and eating most of all meals. Denies N/V/D/C. Reports normal/good appetite PTA with no wt loss. NFPE not warranted. Pt appears nourished. RD does not feel that pt meets malnutrition criteria at this time.   Transferring today.   Nutrition Discharge Planning: adequate po intake    Nutrition Risk Screen    Nutrition Risk Screen: dysphagia or difficulty swallowing    Nutrition/Diet History    Spiritual, Cultural Beliefs, Shinto Practices, Values that Affect Care: no  Factors Affecting Nutritional Intake: None identified at this time    Anthropometrics    Temp: 97.9 °F (36.6 °C)  Height Method: Stated  Height: 6' 1" (185.4 cm)(per RN 3/23)  Height (inches): 73 in  Weight Method: Standard Scale  Weight: 106.6 kg (235 lb 0.2 oz)  Weight (lb): 235.01 lb  Ideal Body Weight (IBW), Male: 184 lb  % Ideal Body Weight, Male (lb): 127.72 lb  BMI (Calculated): 31.1  BMI Grade: 30 - 34.9- obesity - grade I     Lab/Procedures/Meds    Pertinent Labs Reviewed: reviewed  Pertinent Labs Comments: BUN 33, Cr 4.3, GFR 12.2, T dario i1.2  Pertinent Medications Reviewed: reviewed  Pertinent Medications Comments: allopurinol, statin, carvedilol, insulin, losartan, docusate, warfarin    Estimated/Assessed Needs    Weight Used For " Calorie Calculations: 106.6 kg (235 lb 0.2 oz)  Energy Calorie Requirements (kcal): 2292 kcal/day     Protein Requirements: 128 gm/day(1.2 gm/kg)  Weight Used For Protein Calculations: 106.6 kg (235 lb 0.2 oz)  Fluid Requirements (mL): 2 mL/kcal or per MD     RDA Method (mL): 2292  CHO Requirement: 50% kcal from CHO    Nutrition Prescription Ordered    Current Diet Order: Soft Cardiac Renal   Nutrition Order Comments: Nectar Thick Liquids    Evaluation of Received Nutrient/Fluid Intake    I/O: -2L x 24 hrs, -5.4L since 3/26  Comments: LBM 4/7  Tolerance: tolerating  % Intake of Estimated Energy Needs: 75 - 100 %  % Meal Intake: 75 - 100 %    Nutrition Risk    Level of Risk/Frequency of Follow-up: (f/u 1 x wk)     Assessment and Plan    No nutrition diagnosis at this time.      Monitor and Evaluation    Food and Nutrient Intake: energy intake, food and beverage intake  Food and Nutrient Adminstration: diet order  Physical Activity and Function: nutrition-related ADLs and IADLs  Anthropometric Measurements: weight, weight change, body mass index  Biochemical Data, Medical Tests and Procedures: electrolyte and renal panel, gastrointestinal profile, glucose/endocrine profile, inflammatory profile, lipid profile  Nutrition-Focused Physical Findings: overall appearance     Nutrition Follow-Up    RD Follow-up?: Yes

## 2019-04-09 NOTE — PROGRESS NOTES
1120: DC orders received--pt and family updated on POC.    1330: Report called to [facility] (Delaney ALVAREZ).    1445: Pt transported to [facility].  Family present at DC--all questions answered.

## 2019-04-09 NOTE — SUBJECTIVE & OBJECTIVE
Neurologic Chief Complaint: RSW + difficulty with speech    Subjective:     Interval History: Plan for discharge to rehab today.    HPI, Past Medical, Family, and Social History remains the same as documented in the initial encounter.     Review of Systems   Constitutional: Positive for fatigue. Negative for fever.   Eyes: Positive for visual disturbance.   Neurological: Positive for facial asymmetry, speech difficulty and weakness.     Scheduled Meds:   allopurinol  100 mg Oral Daily    atorvastatin  80 mg Oral Daily    carvedilol  25 mg Oral BID WM    doxycycline  100 mg Oral Q12H    finasteride  5 mg Oral Daily    gabapentin  300 mg Oral QHS    insulin detemir U-100  8 Units Subcutaneous Daily    iron sucrose (VENOFER) IVPB  100 mg Intravenous Once per day on Mon Wed Fri    losartan  100 mg Oral QHS    pantoprazole  40 mg Oral Daily    polyethylene glycol  17 g Oral Daily    senna-docusate 8.6-50 mg  1 tablet Oral BID    tamsulosin  0.4 mg Oral Daily    torsemide  20 mg Oral BID    [START ON 4/14/2019] warfarin  5 mg Oral Every Sun    [START ON 4/10/2019] warfarin  5 mg Oral Every Wed    [START ON 4/12/2019] warfarin  5 mg Oral Every Fri    warfarin  7.5 mg Oral Every Mon, Tues, Thurs, Sat     Continuous Infusions:    PRN Meds:sodium chloride, sodium chloride 0.9%, acetaminophen, albuterol-ipratropium, dextrose 50%, dextrose 50%, glucagon (human recombinant), glucose, glucose, insulin aspart U-100, nitroGLYCERIN, ondansetron, QUEtiapine, ramelteon    Objective:     Vital Signs (Most Recent):  Temp: 97.6 °F (36.4 °C) (04/09/19 1204)  Pulse: 65 (04/09/19 1204)  Resp: 18 (04/09/19 1204)  BP: (!) 118/56 (04/09/19 1204)  SpO2: (!) 94 % (04/09/19 1204)  BP Location: Right arm    Vital Signs Range (Last 24H):  Temp:  [97.6 °F (36.4 °C)-98.8 °F (37.1 °C)]   Pulse:  [59-76]   Resp:  [14-18]   BP: (110-141)/(56-79)   SpO2:  [94 %-98 %]   BP Location: Right arm    Physical Exam   Constitutional: He appears  well-developed and well-nourished. No distress.   HENT:   Head: Normocephalic and atraumatic.   Cardiovascular: Normal rate.   Pulmonary/Chest: Effort normal. No accessory muscle usage. No respiratory distress.   Genitourinary:   Genitourinary Comments: Hematoma in the R groin- improved   Skin: Skin is warm. He is not diaphoretic.   Nursing note and vitals reviewed.    Neurological Exam:   LOC: drowsy- arouses to voice  Language and articulation:expressive (nods appropriately) and receptive  Visual Fields: no blink to threat right    Decreased sensation on right   Motor: right upper extremity 1/5 , RLE 2/5  Left side 4/5       Laboratory:  Recent Results (from the past 24 hour(s))   POCT glucose    Collection Time: 04/08/19  4:31 PM   Result Value Ref Range    POCT Glucose 102 70 - 110 mg/dL   POCT glucose    Collection Time: 04/08/19  8:42 PM   Result Value Ref Range    POCT Glucose 117 (H) 70 - 110 mg/dL   Comprehensive metabolic panel    Collection Time: 04/09/19  5:00 AM   Result Value Ref Range    Sodium 137 136 - 145 mmol/L    Potassium 4.1 3.5 - 5.1 mmol/L    Chloride 102 95 - 110 mmol/L    CO2 23 23 - 29 mmol/L    Glucose 78 70 - 110 mg/dL    BUN, Bld 33 (H) 8 - 23 mg/dL    Creatinine 4.3 (H) 0.5 - 1.4 mg/dL    Calcium 8.3 (L) 8.7 - 10.5 mg/dL    Total Protein 6.0 6.0 - 8.4 g/dL    Albumin 3.0 (L) 3.5 - 5.2 g/dL    Total Bilirubin 1.2 (H) 0.1 - 1.0 mg/dL    Alkaline Phosphatase 112 55 - 135 U/L    AST 23 10 - 40 U/L    ALT 22 10 - 44 U/L    Anion Gap 12 8 - 16 mmol/L    eGFR if African American 14.1 (A) >60 mL/min/1.73 m^2    eGFR if non  12.2 (A) >60 mL/min/1.73 m^2   Protime-INR    Collection Time: 04/09/19  5:00 AM   Result Value Ref Range    Prothrombin Time 20.8 (H) 9.0 - 12.5 sec    INR 2.1 (H) 0.8 - 1.2   CBC auto differential    Collection Time: 04/09/19  5:00 AM   Result Value Ref Range    WBC 5.77 3.90 - 12.70 K/uL    RBC 3.02 (L) 4.60 - 6.20 M/uL    Hemoglobin 9.5 (L) 14.0 - 18.0  g/dL    Hematocrit 29.5 (L) 40.0 - 54.0 %    MCV 98 82 - 98 fL    MCH 31.5 (H) 27.0 - 31.0 pg    MCHC 32.2 32.0 - 36.0 g/dL    RDW 15.8 (H) 11.5 - 14.5 %    Platelets 190 150 - 350 K/uL    MPV 9.7 9.2 - 12.9 fL    Immature Granulocytes 0.9 (H) 0.0 - 0.5 %    Gran # (ANC) 2.4 1.8 - 7.7 K/uL    Immature Grans (Abs) 0.05 (H) 0.00 - 0.04 K/uL    Lymph # 2.5 1.0 - 4.8 K/uL    Mono # 0.7 0.3 - 1.0 K/uL    Eos # 0.1 0.0 - 0.5 K/uL    Baso # 0.03 0.00 - 0.20 K/uL    nRBC 0 0 /100 WBC    Gran% 40.8 38.0 - 73.0 %    Lymph% 43.2 18.0 - 48.0 %    Mono% 12.3 4.0 - 15.0 %    Eosinophil% 2.3 0.0 - 8.0 %    Basophil% 0.5 0.0 - 1.9 %    Differential Method Automated    Phosphorus    Collection Time: 04/09/19  5:00 AM   Result Value Ref Range    Phosphorus 3.3 2.7 - 4.5 mg/dL   POCT glucose    Collection Time: 04/09/19  8:01 AM   Result Value Ref Range    POCT Glucose 93 70 - 110 mg/dL   POCT glucose    Collection Time: 04/09/19 11:12 AM   Result Value Ref Range    POCT Glucose 129 (H) 70 - 110 mg/dL       Diagnostic Results     Brain Imaging   CTH non-contrast (3/28/2019):   Evolving subacute to chronic appearing infarcts in L thalamus and L PCA territory  small remote lacune in R putamen.  Cytotoxic cerebral edema    MRI Brain (3/26/19):  New acute left PCA territory distribution infarctions involving L thalamus and L paramedian occipital lobe, as well as L splenium of the corpus callosum   Generalized cerebral volume loss   significant chronic microvascular ischemic disease.      MRI Brain (3/24/19):  Vascular findings similar to CTH and CTA    Vessel Imaging   CTA-MP (3/24/19):  Extensive intracranial atherosclerotic disease:  Left PCA with a high-grade stenosis. High-grade (>70%) stenosis right proximal ICA and moderate (50-70%) stenosis left proximal ICA. Moderate to high-grade stenosis of the intra cavernous/ supraclinoid ICAs bilaterally. High-grade stenosis distal left vertebral artery.    Cardiac Imaging   TTE  (3/23/19):  EF 55%, LVH  severe bilateral atrial enlargement   PA pressure  41   PFO with left to right shunting

## 2019-04-09 NOTE — DISCHARGE SUMMARY
[facility]  Vascular Neurology  Comprehensive Stroke Center  Discharge Summary     Summary:     Admit Date: 3/22/2019  8:39 PM    Discharge Date and Time:  04/09/2019 7:10 AM    Attending Physician: No att. providers found     Discharge Provider: Tammy Saucedo NP    History of Present Illness: 78 years old male patient with medical history of HTN, DM-2, ESRD on HD, Proximal Afib, HLD, CAD s/p CABG 2007,asthma and HFpEF Admitted to hospital medicine on 3/22/2019 for evaluation of unstable angina.Stroke code activated and Vascular neurology consulted on 3/24/2019 AT 12:01 PM. Per patient daughter, patient called her this morning around 7 AM, wasn't sound like him self, she came to his room around 10 AM he was lethargic, disoriented and sound different but no focal weakness, by 10 PM patient developed right side weakness, facial droop, and dysarthria.MRI brain showed acute left PCA infarction, patient symptoms improved after laying flat for the MRI, transferred to neuro critical care.    patient has no previous history of stroke,no history of smoking, patient has been taking asprin, started on Heparin gtt for Afib this morning and was d/derrick due to feelings of euphoria during infusion that resolved after discontinued.      Hospital Course (synopsis of major diagnoses, care, treatment, and services provided during the course of the hospital stay):    Mr. Micheal Hunt is a 78 year old male with a medical history  of HTN, DM-2, ESRD on HD, Proximal Afib, HLD, CAD s/p CABG 2007,asthma and HFpEF Admitted to hospital medicine on 3/22/2019 for evaluation of unstable angina.Stroke code activated and Vascular neurology consulted on 3/24/2019 for right sided weakness, facial droop and dysarthria, MRI showed L PCA infarction, not candidate for TPA ( symptoms duration >4 hours)patient symptoms improved with laying flat, admitted to neuro critical care, heparin gtt intiated. On 03/25, he underwent US of  LE and of carotid with evidence of <50% L ICA stenosis and 60-70% R ICA stenosis, heavily calcified on CTA, additionally L vertebral also heavily calcified. Stroke etiology suspected to be large artery arthersclerosis vs cardioembolic. Patient was bridged to coumadin and stepped down to vascular neurology primary service on 03/31/2019.     Patient discharged with recommendations for admission to [facility] inpatient rehab. Family by phone amenable to plan.     Patient with improvement in stroke symptoms since admission. Inpatient acute stroke work up completed and patient stable for discharge. Please see all appropriate medication changes, imaging results, and necessary follow-up below.      Hospital stay complicated by multiple medical issues: NSTEMI on 03/25- cardiology consulted, signed off and will follow up outpatient, right arm cellulitis- treated with antibiotics last dose 04/09, hematuria- urology consulted and has since signed off, agitation- prn nightly seroquel started, groin hemartoma and umbilical hernia- general surgery consulted and will follow up outpatient.     SEE FULL HOSPITAL COURSE BELOW     Admitted to hospital medicine for unstable angina eval, vascular neurology consulted for right sided weakness, facial droop and dysarthria, MRI showed L PCA infarction, not candidate for TPA ( symptoms duration >4 hours)patient symptoms improved with laying flat, admitted to neuro critical care. Patient with known PCA infarct on L with undulating symptom pattern with modification to level of recumbency. Patient restarted on heparin gtt. Underwent US of LE and of carotid with evidence of <50% L ICA stenosis and 60-70% R ICA stenosis, heavily calcified on CTA, additionally L vertebral also heavily calcified. Patient tolerated HD. Neuro deficit appear to be more severe on 3/27 with decreasing right arm strength now 2/5 and with worsening dysarthria/aphasia. Patient with gross swelling of R forearm - tense, pulses  "intact.   03/29/2019 patient with worsening of neurological deficit, unable to move right upper extremity, patient with pain on passive movement of right fingers exhibited by facial grimace as verbal response to pain/sensation/light touch no longer reliable, aphasia/dysarthria worsening, RLE with 2/5 strength today, patient evaluated by orthopedic surgery regarding R forearm yesterday and today and feel that this is not compartment syndrome and will continue to monitor, distal pulses remain palpable  3/30: NAEON. On heparin gtt. Plans to step down  3/31: stepped down to VN without major events. transfused with pRBC x1 due to drop in Hbg. Discontinued heparin gtt. INR increased to 1.2, increased warfarin to 7.5  04/01/19 Visited patient in dialysis today.  On coumadin and atorvastatin for secondary stroke prevention H/H stabilized at 7.6 after 1 unit pRBC transfusion on 03/31.  Epo given during dialysis.  Patient has worsening right groin hematoma.  Stat right femoral artery u/s ordered.  On clindamycin for suspected right arm cellulitis.  Sister-in-law concern for urination.  Patient was incontinent and making urine but today has not had any urination.  U/A and bladder scan ordered   04/02/19 hospital medicine consulted for multiple medical issues. Right arm edema worsening.  Switching antibiotics from oral to IV     4/3/19 - drowsy today, less interactive. Plan for dialysis today. Noted to have gross hematuria with clots , bladder scan 315.   4/4/19 - will place condom catheter for hematuria monitoring, H&H stable. Hospital medicine helping with cellulitis. cpap ordered for night  4/5/19 - dialysis today, agitated overnight and throughout the day. Appears angry telling his family today "leave me alone, go home". Will plan for telesitter, pharmacologic therapy ordered prn tonight, with plans to involve psych if not able to control as at this time its causing him to be less interactive with therapy   04/06 Patient " agitation improved with Seroquel and ramelteon.  INR increased to 2.8.  Coumadin decreased from 7.5mg to 5mg qd.  Will continue to monitor daily.  Edema in left arm improving, will continue doxycycline for cellulitis   04/07/19 Right arm edema and redness improving.  Last dose of doxycycline will be 04/09.  INR 2.5.  Patient stable for discharge   04/08/19: No acute events overnight. Patient medically ready for [id number]/09/19: Plan for discharge to rehab today      STROKE DOCUMENTATION         NIH Scale:  Interval: 7 days or at discharge (whichever comes first)  1a. Level of Consciousness: 0-->Alert, keenly responsive  1b. LOC Questions: 1-->Answers one question correctly  1c. LOC Commands: 0-->Performs both tasks correctly  2. Best Gaze: 0-->Normal  3. Visual: 2-->Complete hemianopia  4. Facial Palsy: 0-->Normal symmetrical movements  5a. Motor Arm, Left: 1-->Drift, limb holds 90 (or 45) degrees, but drifts down before full 10 seconds, does not hit bed or other support  5b. Motor Arm, Right: 3-->No effort against gravity, limb falls  6a. Motor Leg, Left: 1-->Drift, leg falls by the end of the 5-sec period but does not hit bed  6b. Motor Leg, Right: 3-->No effort against gravity, leg falls to bed immediately  7. Limb Ataxia: 0-->Absent  8. Sensory: 1-->Mild-to-moderate sensory loss, patient feels pinprick is less sharp or is dull on the affected side, or there is a loss of superficial pain with pinprick, but patient is aware of being touched  9. Best Language: 2-->Severe aphasia, all communication is through fragmentary expression, great need for inference, questioning, and guessing by the listener. Range of information that can be exchanged is limited, listener carries burden of. . . (see row details)  10. Dysarthria: 1-->Mild-to-moderate dysarthria, patient slurs at least some words and, at worst, can be understood with some difficulty  11. Extinction and Inattention (formerly Neglect): 0-->No  abnormality  Total (NIH Stroke Scale): 15        Modified Mulberry Score: 4  [place] Coma Scale:    ABCD2 Score:    [id number] Score:8  HAS -BLED Score:   ICH Score:   Hunt & Sharp Classification:       Assessment/Plan:     Diagnostic Results:       Brain Imaging   CTH non-contrast (3/28/2019):   Evolving subacute to chronic appearing infarcts in L thalamus and L PCA territory  small remote lacune in R putamen.  Cytotoxic cerebral edema     MRI Brain (3/26/19):  New acute left PCA territory distribution infarctions involving L thalamus and L paramedian occipital lobe, as well as L splenium of the corpus callosum   Generalized cerebral volume loss   significant chronic microvascular ischemic disease.       MRI Brain (3/24/19):  Vascular findings similar to CTH and CTA     Vessel Imaging   CTA-MP (3/24/19):  Extensive intracranial atherosclerotic disease:  Left PCA with a high-grade stenosis. High-grade (>70%) stenosis right proximal ICA and moderate (50-70%) stenosis left proximal ICA. Moderate to high-grade stenosis of the intra cavernous/ supraclinoid ICAs bilaterally. High-grade stenosis distal left vertebral artery.     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 [I63.332] 03/24/2019 No    Umbilical hernia without obstruction and without gangrene [K42.9] 04/02/2019 Yes    Umbilical hernia [K42.9] 04/02/2019 Unknown    Cytotoxic cerebral edema [G93.6] 04/01/2019 Unknown    Groin hematoma [S30.1XXA] 03/29/2019 Unknown    Cellulitis of right arm [L03.113] 03/28/2019 Unknown    Dysarthria due to acute cerebrovascular accident (CVA) [I63.9, R47.1] 03/28/2019 Unknown    Encephalopathy [G93.40] 03/28/2019  Unknown    Patent foramen ovale [Q21.1] 03/25/2019 Not Applicable    Paroxysmal atrial fibrillation [I48.0] 03/24/2019 Yes    ESRD (end stage renal disease) on dialysis [N18.6, Z99.2] 03/23/2019 Not Applicable    NSTEMI (non-ST elevated myocardial infarction) [I21.4] 03/20/2019 Yes    Type 2 diabetes mellitus, without long-term current use of insulin [E11.9] 03/18/2019 Yes    Hypertension due to end stage renal disease caused by type 2 diabetes mellitus, on dialysis [E11.22, I12.0, Z99.2, N18.6] 03/18/2019 Not Applicable    Pulmonary hypertension [I27.20] 03/18/2019 Yes    Long term (current) use of anticoagulants [Z79.01] 03/14/2019 Not Applicable    Benign prostatic hyperplasia without lower urinary tract symptoms [N40.0] 08/30/2017 Yes    Anemia of chronic disease [D63.8] 12/15/2014 Yes    Obstructive sleep apnea [G47.33] 09/10/2014 Yes    Gout, arthritis [M10.9] 04/27/2012 Yes    CAD (coronary artery disease), 2V CABG 2007 [I25.10]  Yes    Essential hypertension [I10] 03/27/2012 Yes    Hyperlipidemia [E78.5] 03/27/2012 Yes    Carotid artery stenosis [I65.29] 03/27/2012 Yes      Problems Resolved During this Admission:    Diagnosis Date Noted Date Resolved POA    Hemoglobin drop [R71.0] 03/31/2019 04/08/2019 Unknown    CAP (community acquired pneumonia) [J18.9] 03/25/2019 03/25/2019 Unknown    Wheezing [R06.2] 03/25/2019 04/08/2019 Yes    SOB (shortness of breath) [R06.02] 03/20/2019 04/08/2019 Yes    Severe persistent asthma with exacerbation [J45.51] 03/11/2019 04/08/2019 Yes    Severe persistent asthma with acute exacerbation [J45.51] 02/21/2018 04/08/2019 Yes    Hematuria [R31.9] 02/16/2012 04/08/2019 No         Recommendations:     Post-discharge complication risks: Falls, Pneumonia, Skin breakdown    Stroke Education given to: family    Follow-up in Stroke Clinic in 4-6 weeks.     Discharge Plan:  Statin: Atorvastatin 40 mg  Anticoagulant: Warfarin    Follow Up:  Follow-up Information      Pk Lakhani MD.    Specialty:  Family Medicine  Why:  Outpatient Services  Contact information:  [address]  [facility] [address]  [phone]             Call Darrel Wong MD.    Specialty:  General Surgery  Why:  If full recovery from stroke and cleared by cardiologist if patient wants hernia repaired  Contact information:  [address]  [facility] [address]  [phone]             Pk Lakhani MD In 1 week.    Specialty:  Family Medicine  Contact information:  [address]  [facility] [address]  [phone]             [facility] - Cardiology. Call in 1 week.    Specialty:  Cardiology  Contact information:  [address] [facility] [address] [phone]  Additional information:  3rd floor           Bk Narayan MD In 4 weeks.    Specialty:  Neurology  Why:  hospital follow up  Contact information:  [address] [facility] [address]  [phone]                   Patient Instructions:      Notify your health care provider if you experience any of the following:  increased confusion or weakness     Notify your health care provider if you experience any of the following:  persistent dizziness, light-headedness, or visual disturbances     Notify your health care provider if you experience any of the following:  severe persistent headache     Notify your health care provider if you experience any of the following:  difficulty breathing or increased cough     Notify your health care provider if you experience any of the following:  severe uncontrolled pain     Notify your health care provider if you experience any of the following:  persistent nausea and vomiting or diarrhea     Notify your health care provider if you experience any of the following:  temperature >100.4       Medications:  Reconciled Home Medications:      Medication List      START taking these medications    doxycycline 100 MG tablet  Commonly known as:  VIBRA-TABS  Take 1 tablet (100 mg total) by  mouth every 12 (twelve) hours.     ferrous sulfate 325 mg (65 mg iron) Tab tablet  Commonly known as:  FEOSOL  Take 1 tablet (325 mg total) by mouth twice daily three times a week. On dialysis days  Start taking on:  4/10/2019     * losartan 100 MG tablet  Commonly known as:  COZAAR  Take 1 tablet (100 mg total) by mouth every evening.     * losartan 100 MG tablet  Commonly known as:  COZAAR  Take 1 tablet (100 mg total) by mouth once daily.     pantoprazole 40 mg Grps  Commonly known as:  PROTONIX  Take 1 packet (40 mg total) by mouth once daily.     QUEtiapine 25 MG Tab  Commonly known as:  SEROQUEL  Take 1 tablet (25 mg total) by mouth nightly as needed (unredirectable agitation).     ramelteon 8 mg tablet  Commonly known as:  ROZEREM  Take 1 tablet (8 mg total) by mouth nightly as needed for Insomnia.         * This list has 2 medication(s) that are the same as other medications prescribed for you. Read the directions carefully, and ask your doctor or other care provider to review them with you.            CHANGE how you take these medications    carvedilol 25 MG tablet  Commonly known as:  COREG  Take 1 tablet (25 mg total) by mouth 2 (two) times daily with meals.  What changed:    · medication strength  · how much to take  · Another medication with the same name was removed. Continue taking this medication, and follow the directions you see here.     * warfarin 7.5 MG tablet  Commonly known as:  COUMADIN  Take 1 tablet (7.5 mg total) by mouth every Monday, Tuesday, Thursday, Saturday.  What changed:  You were already taking a medication with the same name, and this prescription was added. Make sure you understand how and when to take each.     * warfarin 5 MG tablet  Commonly known as:  COUMADIN  Take 1 tablet (5 mg total) by mouth once. Once daily on Friday 5mg for 1 dose  What changed:  You were already taking a medication with the same name, and this prescription was added. Make sure you understand how and  when to take each.     * warfarin 5 MG tablet  Commonly known as:  COUMADIN  Take 1 tablet (5 mg total) by mouth every Wednesday.  Start taking on:  4/10/2019  What changed:    · medication strength  · how much to take  · when to take this     * warfarin 5 MG tablet  Commonly known as:  COUMADIN  Take 1 tablet (5 mg total) by mouth every Sunday.  Start taking on:  4/14/2019  What changed:  You were already taking a medication with the same name, and this prescription was added. Make sure you understand how and when to take each.         * This list has 4 medication(s) that are the same as other medications prescribed for you. Read the directions carefully, and ask your doctor or other care provider to review them with you.            CONTINUE taking these medications    albuterol 90 mcg/actuation inhaler  Commonly known as:  PROVENTIL/VENTOLIN HFA  2 puffs every 4 hours as needed for cough, wheeze, or shortness of breath     albuterol-ipratropium 2.5 mg-0.5 mg/3 mL nebulizer solution  Commonly known as:  DUO-NEB  Take 3 mLs by nebulization every 6 (six) hours as needed for Wheezing or Shortness of Breath.     * allopurinol 100 MG tablet  Commonly known as:  ZYLOPRIM  Take 100 mg by mouth once daily.     * allopurinol 100 MG tablet  Commonly known as:  ZYLOPRIM  Take 1 tablet (100 mg total) by mouth once daily.     amLODIPine 10 MG tablet  Commonly known as:  NORVASC  Take 10 mg by mouth once daily.     aspirin 81 MG EC tablet  Commonly known as:  ECOTRIN  Take 81 mg by mouth once daily.     atorvastatin 80 MG tablet  Commonly known as:  LIPITOR  TAKE 1 TABLET (80 MG TOTAL) BY MOUTH ONCE DAILY.     budesonide-formoterol 160-4.5 mcg 160-4.5 mcg/actuation Hfaa  Commonly known as:  SYMBICORT  Inhale 2 puffs into the lungs every 12 (twelve) hours. Controller     celecoxib 200 MG capsule  Commonly known as:  CeleBREX  Take 1 capsule (200 mg total) by mouth once daily.     finasteride 5 mg tablet  Commonly known as:   PROSCAR  TAKE 1 TABLET ONCE DAILY.     fluticasone 50 mcg/actuation nasal spray  Commonly known as:  FLONASE  2 sprays (100 mcg total) by Each Nare route once daily.     gabapentin 300 MG capsule  Commonly known as:  NEURONTIN  Take 1 capsule (300 mg total) by mouth every evening.     montelukast 10 mg tablet  Commonly known as:  SINGULAIR  Take 10 mg by mouth every evening.     NITROSTAT 0.4 MG SL tablet  Generic drug:  nitroGLYCERIN  PLACE 1 TABLET (0.4 MG TOTAL) UNDER THE TONGUE EVERY 5 (FIVE) MINUTES AS NEEDED FOR CHEST PAIN.     tamsulosin 0.4 mg Cap  Commonly known as:  FLOMAX  TAKE 1 CAPSULE BY MOUTH EVERY DAY     tiotropium bromide 1.25 mcg/actuation Mist  Commonly known as:  SPIRIVA RESPIMAT  Inhale 2.5 mcg into the lungs once daily. Controller     torsemide 20 MG Tab  Commonly known as:  DEMADEX  Take 1 tablet (20 mg total) by mouth 2 (two) times daily.         * This list has 2 medication(s) that are the same as other medications prescribed for you. Read the directions carefully, and ask your doctor or other care provider to review them with you.            STOP taking these medications    isosorbide mononitrate 10 mg tablet  Commonly known as:  ISMO,MONOKET     meclizine 25 mg tablet  Commonly known as:  ANTIVERT     mirtazapine 7.5 MG Tab  Commonly known as:  REMERON     omeprazole 20 MG capsule  Commonly known as:  PRILOSEC     zolpidem 5 MG Tab  Commonly known as:  TIMMY Saucedo NP  [facility]  Department of Vascular Neurology   [facility]

## 2019-04-10 ENCOUNTER — TELEPHONE (OUTPATIENT)
Dept: FAMILY MEDICINE | Facility: CLINIC | Age: 79
End: 2019-04-10

## 2019-04-10 NOTE — PT/OT/SLP DISCHARGE
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  Please refer to prior OT note dated 4/8/2019 for functional status.    Assessment:      Patient has not met goals.    Objective:     GOALS:   Multidisciplinary Problems     Occupational Therapy Goals     Not on file          Multidisciplinary Problems (Resolved)        Problem: Occupational Therapy Goal    Goal Priority Disciplines Outcome Interventions   Occupational Therapy Goal   (Resolved)     OT, PT/OT Outcome(s) achieved    Description:  Goals to be met by:  4/15/19    Patient will increase functional independence with ADLs by performing:    UE Dressing with Moderate Assistance using nikita-dressing technique.  Grooming while EOB with Moderate Assistance.  Rolling to Right with minimal assistance  Rolling to Left with Maximum Assistance.   Supine to sit with Mod Assistance.  Toilet transfer to bedside commode with Maximum Assistance.  Pt/family demo and verbalized understanding of  BUE HEP and proper positioning in bed to prevent edema, contractures, and pressure ulcers.  Pt follow 100% of simple one-step commands with minimal cues provided.                         Reasons for Discontinuation of Therapy Services  Transfer to alternate level of care.      Plan:     Patient Discharged to: Inpatient Rehab    DIANE Olmos  4/10/2019

## 2019-04-10 NOTE — TELEPHONE ENCOUNTER
Doxycyline 100 mg one tablet every 12 hours prescribed 4/8/19 disp 2 tabs--by Tammy Saucedo NP in Neurology. Call placed to Viviana with [facility] for notification; advised Mrs. Michelle was unavailable. Message left with Gloria ALVAERZ with above information.           ----- Message from Magali Rocha sent at 4/10/2019  9:12 AM CDT -----  Type: Needs Medical Advice    Who Called:  [facility]/Viviana/Nurse  Best Call Back Number: [phone]  Additional Information: Wants to know when the doxycycline (VIBRA-TABS) 100 MG tablet was first given. He was admitted there last night.

## 2019-04-11 ENCOUNTER — PATIENT MESSAGE (OUTPATIENT)
Dept: CARDIOLOGY | Facility: CLINIC | Age: 79
End: 2019-04-11

## 2019-04-11 NOTE — PT/OT/SLP DISCHARGE
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  Please refer to prior PT noted date on 2019 for functional status.     Assessment:     Patient was discharged unexpectedly.  Information required to complete an accurate discharge summary is unknown.  Refer to therapy initial evaluation and last progress note for initial and most recent functional status and goal achievement.  Recommendations made may be found in medical record.    Objective:     GOALS:   Multidisciplinary Problems     Physical Therapy Goals        Problem: Physical Therapy Goal    Goal Priority Disciplines Outcome Goal Variances Interventions   Physical Therapy Goal   (Resolved)     PT, PT/OT Outcome(s) achieved     Description:  Pt met goals at [facility] to demonstrate safe level of mobility for d/c home.  Pt amb community distance with (S) only.           Problem: Physical Therapy Goal    Goal Priority Disciplines Outcome Goal Variances Interventions   Physical Therapy Goal     PT, PT/OT Ongoing (interventions implemented as appropriate)     Description:  Goals to be met by: 2019    Patient will increase functional independence with mobility by performin. Supine to sit with moderate assistance  2. Sit to supine with moderate Assistance  3. Sit to stand transfer with Maximum Assistance  4. Gait  x 2 feet with Maximum Assistance using least restrictive device  5. Sitting at edge of bed x15 minutes with Contact Guard Assistance  6. Lower extremity exercise program x20 reps per handout, with assistance as needed                      Reasons for Discontinuation of Therapy Services  Transfer to alternate level of care.      Plan:     Patient Discharged to: Inpatient Rehab.    Salome Brennan, PT  2019

## 2019-04-12 ENCOUNTER — TELEPHONE (OUTPATIENT)
Dept: UROLOGY | Facility: CLINIC | Age: 79
End: 2019-04-12

## 2019-04-12 NOTE — TELEPHONE ENCOUNTER
----- Message from Jose Nix sent at 4/12/2019 12:58 PM CDT -----  Contact: Yaquelin Castaneda   Caller is requesting a sooner appointment.  Caller declined first available appointment listed below.  Caller will not accept being placed on the waitlist and is requesting a message be sent to doctor.    Name of Caller: Yaquelin Castaneda   Best Call Back Number: [phone]   Additional Information: she is requesting patient be seen next week for a hospital follow up with       Patient suffered a stroke and heart attack on 3/24 at [facility], he has been discharged into a rehab center in [place]. He is wheelchair bound they are advising a f/u appointment.

## 2019-04-12 NOTE — TELEPHONE ENCOUNTER
I spoke with the pt's daughter and advised her the facility needs to call for the appointment. She verbalized understanding.

## 2019-04-17 ENCOUNTER — PATIENT MESSAGE (OUTPATIENT)
Dept: FAMILY MEDICINE | Facility: CLINIC | Age: 79
End: 2019-04-17

## 2019-04-18 ENCOUNTER — TELEPHONE (OUTPATIENT)
Dept: ENDOSCOPY | Facility: HOSPITAL | Age: 79
End: 2019-04-18

## 2019-04-22 ENCOUNTER — TELEPHONE (OUTPATIENT)
Dept: CARDIOLOGY | Facility: CLINIC | Age: 79
End: 2019-04-22

## 2019-04-22 NOTE — TELEPHONE ENCOUNTER
----- Message from Mery Tai MA sent at 4/22/2019  2:24 PM CDT -----  Contact: Crow daughter [phone]  Pt. has an appt.on 5/8 to see  as a new pt. that was recommended to him to be seen in the clinic. Want to know if he can be seen sooner as an imergent appt. He's on dialysis,has Steal syndrome and suffered a heart attack and a stroke. Pt. needs to have an angiogram but first needs to see a cardiologist. He was release from [facility] on 4/9 and placed in a rehab.facility-[place]. Please call Crow.

## 2019-04-22 NOTE — TELEPHONE ENCOUNTER
Returned patient's call - spoke with daughter Crow. Explained that Dr. Jackson does not have any time open sooner that 5/8/19. Offered appt with Dr Dennison on 4/29/19 at 3pm. Daughter accepted.     ELVIRA Florentino Staff   Caller: Crow daughter [phone] (Today,  2:24 PM)             Pt. has an appt.on 5/8 to see  as a new pt. that was recommended to him to be seen in the clinic. Want to know if he can be seen sooner as an imergent appt. He's on dialysis,has Steal syndrome and suffered a heart attack and a stroke. Pt. needs to have an angiogram but first needs to see a cardiologist. He was release from [facility] on 4/9 and placed in a rehab.facility-[place]. Please call Crow.

## 2019-04-23 ENCOUNTER — TELEPHONE (OUTPATIENT)
Dept: ENDOSCOPY | Facility: HOSPITAL | Age: 79
End: 2019-04-23

## 2019-04-30 ENCOUNTER — TELEPHONE (OUTPATIENT)
Dept: NEUROLOGY | Facility: CLINIC | Age: 79
End: 2019-04-30

## 2019-04-30 NOTE — TELEPHONE ENCOUNTER
----- Message from Benedict Marcus sent at 4/30/2019  1:18 PM CDT -----  Contact: Tonya (Daughter) [phone]  Needs Advice    Reason for call: Please contact Tonya to discuss the patient's hosp follow up. He need to be Tuesday or Thursday between 8am-12pm.        Communication Preference:PHONE    Additional Information:

## 2019-05-01 ENCOUNTER — TELEPHONE (OUTPATIENT)
Dept: ENDOSCOPY | Facility: HOSPITAL | Age: 79
End: 2019-05-01

## 2019-05-08 ENCOUNTER — HOSPITAL ENCOUNTER (EMERGENCY)
Facility: HOSPITAL | Age: 79
Discharge: HOME | End: 2019-05-09
Attending: EMERGENCY MEDICINE
Payer: MEDICARE
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
## 2019-05-08 DIAGNOSIS — T14.8XXA FRACTURE: ICD-10-CM

## 2019-05-08 DIAGNOSIS — M79.603 ARM PAIN: ICD-10-CM

## 2019-05-08 LAB
ALBUMIN SERPL BCP-MCNC: 3.4 G/DL (ref 3.5–5.2)
ALP SERPL-CCNC: 115 U/L (ref 55–135)
ALT SERPL W/O P-5'-P-CCNC: 9 U/L (ref 10–44)
ANION GAP SERPL CALC-SCNC: 10 MMOL/L (ref 8–16)
AST SERPL-CCNC: 15 U/L (ref 10–40)
BASOPHILS # BLD AUTO: 0.04 K/UL (ref 0–0.2)
BASOPHILS NFR BLD: 0.6 % (ref 0–1.9)
BILIRUB SERPL-MCNC: 0.7 MG/DL (ref 0.1–1)
BUN SERPL-MCNC: 17 MG/DL (ref 8–23)
CALCIUM SERPL-MCNC: 8.9 MG/DL (ref 8.7–10.5)
CHLORIDE SERPL-SCNC: 101 MMOL/L (ref 95–110)
CO2 SERPL-SCNC: 30 MMOL/L (ref 23–29)
CREAT SERPL-MCNC: 3.6 MG/DL (ref 0.5–1.4)
DIFFERENTIAL METHOD: ABNORMAL
EOSINOPHIL # BLD AUTO: 0.3 K/UL (ref 0–0.5)
EOSINOPHIL NFR BLD: 3.8 % (ref 0–8)
ERYTHROCYTE [DISTWIDTH] IN BLOOD BY AUTOMATED COUNT: 15 % (ref 11.5–14.5)
EST. GFR  (AFRICAN AMERICAN): 17.5 ML/MIN/1.73 M^2
EST. GFR  (NON AFRICAN AMERICAN): 15.1 ML/MIN/1.73 M^2
GLUCOSE SERPL-MCNC: 123 MG/DL (ref 70–110)
HCT VFR BLD AUTO: 27.8 % (ref 40–54)
HGB BLD-MCNC: 9.4 G/DL (ref 14–18)
IMM GRANULOCYTES # BLD AUTO: 0.04 K/UL (ref 0–0.04)
IMM GRANULOCYTES NFR BLD AUTO: 0.6 % (ref 0–0.5)
INR PPP: 1.4 (ref 0.8–1.2)
LYMPHOCYTES # BLD AUTO: 2.3 K/UL (ref 1–4.8)
LYMPHOCYTES NFR BLD: 33.2 % (ref 18–48)
MCH RBC QN AUTO: 32.1 PG (ref 27–31)
MCHC RBC AUTO-ENTMCNC: 33.8 G/DL (ref 32–36)
MCV RBC AUTO: 95 FL (ref 82–98)
MONOCYTES # BLD AUTO: 0.6 K/UL (ref 0.3–1)
MONOCYTES NFR BLD: 8.2 % (ref 4–15)
NEUTROPHILS # BLD AUTO: 3.7 K/UL (ref 1.8–7.7)
NEUTROPHILS NFR BLD: 53.6 % (ref 38–73)
NRBC BLD-RTO: 0 /100 WBC
PLATELET # BLD AUTO: 138 K/UL (ref 150–350)
PMV BLD AUTO: 9.2 FL (ref 9.2–12.9)
POTASSIUM SERPL-SCNC: 3 MMOL/L (ref 3.5–5.1)
PROT SERPL-MCNC: 6.4 G/DL (ref 6–8.4)
PROTHROMBIN TIME: 13.6 SEC (ref 9–12.5)
RBC # BLD AUTO: 2.93 M/UL (ref 4.6–6.2)
SODIUM SERPL-SCNC: 141 MMOL/L (ref 136–145)
WBC # BLD AUTO: 6.93 K/UL (ref 3.9–12.7)

## 2019-05-08 PROCEDURE — 99284 EMERGENCY DEPT VISIT MOD MDM: CPT | Mod: ,,, | Performed by: EMERGENCY MEDICINE

## 2019-05-08 PROCEDURE — 85025 COMPLETE CBC W/AUTO DIFF WBC: CPT

## 2019-05-08 PROCEDURE — 99284 EMERGENCY DEPT VISIT MOD MDM: CPT

## 2019-05-08 PROCEDURE — 99284 PR EMERGENCY DEPT VISIT,LEVEL IV: ICD-10-PCS | Mod: ,,, | Performed by: EMERGENCY MEDICINE

## 2019-05-08 PROCEDURE — 80053 COMPREHEN METABOLIC PANEL: CPT

## 2019-05-08 PROCEDURE — 85610 PROTHROMBIN TIME: CPT

## 2019-05-08 RX ORDER — POTASSIUM CHLORIDE 20 MEQ/1
40 TABLET, EXTENDED RELEASE ORAL ONCE
Status: DISCONTINUED | OUTPATIENT
Start: 2019-05-09 | End: 2019-05-09

## 2019-05-09 ENCOUNTER — TELEPHONE (OUTPATIENT)
Dept: ORTHOPEDICS | Facility: CLINIC | Age: 79
End: 2019-05-09

## 2019-05-09 VITALS
TEMPERATURE: 97 F | OXYGEN SATURATION: 98 % | HEIGHT: 74 IN | WEIGHT: 208 LBS | RESPIRATION RATE: 20 BRPM | SYSTOLIC BLOOD PRESSURE: 154 MMHG | BODY MASS INDEX: 26.69 KG/M2 | DIASTOLIC BLOOD PRESSURE: 75 MMHG | HEART RATE: 83 BPM

## 2019-05-09 RX ORDER — POTASSIUM CHLORIDE 750 MG/1
10 TABLET, EXTENDED RELEASE ORAL DAILY
Qty: 5 TABLET | Refills: 0 | Status: SHIPPED | OUTPATIENT
Start: 2019-05-09 | End: 2019-05-14

## 2019-05-09 NOTE — ED PROVIDER NOTES
Encounter Date: 5/8/2019       History     Chief Complaint   Patient presents with    Fall     Pt to ER via EMS from [facility]. Pt had a fall yesterday and hit his head. He is taking coumadin. Unsure of LOC or how pt fell. He has right hand fracture. Staff states that pt is more slow to respond today. Pt is drowsy upon arrival. Fam states patient had dialysis today and it is normal for him to be more tired. He is disoriented to time which is his norm. Fam states recent pneumonia, stroke and MI in march.      Micheal Hunt is a 78 year old man who presented to the [facility] ED on 5/8/2019 after a fall at his skilled nursing home on 5/7.  MHx includes HTN, T2DM, ESRD on HD, Afib on coumadin, CAD, HFpEF.  He was recently admitted to [facility] ED in March 2019 for thrombotic stroke.  Details of the fall are unclear, but daughter was told that he was found down in the bathroom despite being bed bound and requiring full assistance for mobilization, nurse that found him reports he was conscious when discovered.  He does not recall the event.  After the fall he was tolerating all scheduled activities for the day, but nursing staff began to notice that his R hand/wrist were becoming progressively more swollen and a abrasion was noted on his R cheek.  He began reporting pain in the R hand despite full paralysis of that limb.  Per daughter, he also began acting/talking slower than usual.  Imaging revealed fracture.  Daughter also reports that patient has been more lethargic after his dialysis session today.  At baseline he is interactive and can communicate w/ delayed responses.        Review of patient's allergies indicates:   Allergen Reactions    Ace inhibitors Other (See Comments)     Cough    Arb-angiotensin receptor antagonist Itching    Eplerenone Other (See Comments)     Marked bradycardia, 40, tiredness and weakness      Sulfa (sulfonamide antibiotics) Itching     Patient says this was 10 years ago and doesn't  remember what happened     Past Medical History:   Diagnosis Date    NICK (acute kidney injury) 7/29/2016    Allergy     Anemia, mild 12/15/2014    Arthritis     Gout    Benign essential HTN 3/27/2012    BMI 29.0-29.9,adult 5/10/2018    BPH (benign prostatic hyperplasia)     BPH (benign prostatic hyperplasia)     CAD (coronary artery disease) 2006    Chronic kidney disease     due to ibuprofen    Colon polyp     CRF (chronic renal failure), stage 5     Diverticulosis     Gastritis     GERD (gastroesophageal reflux disease)     Gout     History of colon polyps 5/3/2018    HTN (hypertension) 3/27/2012    Hyperlipidemia     Hyperlipidemia     LLL pneumonia 6/14/2018    LVH (left ventricular hypertrophy)     Mesenteric ischemia     Murmur, cardiac 3/27/2012    GRICELDA (obstructive sleep apnea)     DOES NOT USE A MACHINE    Sinus problem     Syncope and collapse      Past Surgical History:   Procedure Laterality Date    APPENDECTOMY      BLOCK-NERVE Left 5/31/2016    [facility] OR    CARDIAC CATHETERIZATION      COLONOSCOPY  2011    COLONOSCOPY N/A 5/3/2018    [facility] ENDO    COLONOSCOPY N/A 9/10/2015    [facility] at [facility] ENDO    CORONARY ARTERY BYPASS GRAFT  4/2007    x 1    CYSTOSCOPY N/A 8/30/2017    [facility] at [facility] OR    CYSTOSCOPY N/A 11/10/2015    [facility] at [facility] OR    ESOPHAGOGASTRODUODENOSCOPY (EGD) N/A 1/4/2016    [facility]formed by [facility] ENDO (2ND FLR)    ESOPHAGOGASTRODUODENOSCOPY (EGD) N/A 10/15/2014    Performed by Messi Harris [facility] at [facility] ENDO    INJECTION-STEROID-EPIDURAL-TRANSFORAMINAL Left 2/25/2016    [facility] OR    JOINT REPLACEMENT      left knee total replacement  X 3    mid leftt finger      from a cactuss    MOLE REMOVAL  2016    RADIOFREQUENCY THERMOCOAGULATION (RFTC)-NERVE-MEDIAN  BRANCH-LUMBAR Left 2016    [facility] OR    rotative cuff      no rotative cuffs on bilat shoulders has pins     SHOULDER SURGERY      shoulder surgery bilat  RIGHT X 4; LEFT X 3    TRANSRECTAL ULTRASOUND GUIDED PROSTATE BIOPSY Bilateral 11/10/2015    Performed by [facility] at [facility] OR    ULTRASOUND-ENDOSCOPIC-UPPER N/A 2017    [facility] at [facility] ENDO (2ND FLR)    ULTRASOUND-ENDOSCOPIC-UPPER N/A 2016    Performed by [facility] at [facility] ENDO (2ND FLR)    ULTRASOUND-ENDOSCOPIC-UPPER N/A 2015    Performed by Jason Saleem [facility] at [facility] ENDO (2ND FLR)     Family History   Problem Relation Age of Onset    Heart disease Mother     Sudden death Father     Cancer Father         advanced lung ca- found after 2 story fall    Stroke Sister     Pneumonia Sister          from PNA    Heart disease Sister     Hypertension Brother     Kidney cancer Neg Hx     Prostate cancer Neg Hx     Urolithiasis Neg Hx     Allergic rhinitis Neg Hx     Allergies Neg Hx     Angioedema Neg Hx     Asthma Neg Hx     Atopy Neg Hx     Eczema Neg Hx     Immunodeficiency Neg Hx     Rhinitis Neg Hx     Urticaria Neg Hx      Social History     Tobacco Use    Smoking status: Never Smoker    Smokeless tobacco: Never Used   Substance Use Topics    Alcohol use: No    Drug use: No     Review of Systems   Unable to perform ROS: Mental status change       Physical Exam     Initial Vitals [193]   BP Pulse Resp Temp SpO2   (!) 178/87 72 16 97.3 °F (36.3 °C) 99 %      MAP       --         Physical Exam    Constitutional: He appears well-developed and well-nourished. He is not diaphoretic. He is sleeping. He appears ill. No distress.   Patient awakens to stimulation but is unable to verbalize or follow commands, and falls back asleep quickly.   HENT:   Head: Normocephalic and atraumatic.   Right Ear: External ear normal.   Left Ear: External ear  normal.   Eyes: Conjunctivae and EOM are normal. No scleral icterus.   Pupils constricted but reactive to light.   Neck: Normal range of motion. Neck supple.   Cardiovascular: Normal rate. An irregular rhythm present.    Murmur heard.  Pulmonary/Chest: Breath sounds normal. No respiratory distress. He has no wheezes. He has no rales.   Abdominal: Soft. Bowel sounds are normal. He exhibits no distension. There is no tenderness.   Musculoskeletal: Normal range of motion. He exhibits edema. He exhibits no tenderness.   Swelling noted to R lateral wrist   Neurological: A sensory deficit is present.   Skin: Skin is warm and dry. No rash noted. No erythema. No pallor.         ED Course   Procedures  Labs Reviewed   CBC W/ AUTO DIFFERENTIAL - Abnormal; Notable for the following components:       Result Value    RBC 2.93 (*)     Hemoglobin 9.4 (*)     Hematocrit 27.8 (*)     Mean Corpuscular Hemoglobin 32.1 (*)     RDW 15.0 (*)     Platelets 138 (*)     Immature Granulocytes 0.6 (*)     All other components within normal limits   COMPREHENSIVE METABOLIC PANEL - Abnormal; Notable for the following components:    Potassium 3.0 (*)     CO2 30 (*)     Glucose 123 (*)     Creatinine 3.6 (*)     Albumin 3.4 (*)     ALT 9 (*)     eGFR if  17.5 (*)     eGFR if non  15.1 (*)     All other components within normal limits   PROTIME-INR - Abnormal; Notable for the following components:    Prothrombin Time 13.6 (*)     INR 1.4 (*)     All other components within normal limits          Imaging Results    None          Medical Decision Making:   History:   Old Medical Records: I decided to obtain old medical records.  Old Records Summarized: records from clinic visits and records from previous admission(s).       <> Summary of Records: 78M w/ HTN, T2DM, ESRD on HD, Afib on coumadin, CAD, HFpEF.  Was recently admitted in March 2019 for thrombotic stroke.  Initial Assessment:   78M w/ HTN, T2DM, ESRD on HD,  Afib on coumadin, CAD, HFpEF, presenting with recent fall at SNF.  Fall occurred on 5/7/2019 day prior to presentation.  LOC questionable, story of fall unclear according to daughter, but apparently he was found on the ground in the bathroom despite being bedbound and requiring full physical support for mobilization.  He had no new deficits immediately after the fall but as the day went on he developed R hand/wrist swelling and abrasion as noted on his R cheek.  He also began acting/talking slower than his baseline.  After HD 5/8 we was more lethargic than usual at which point daughter wanted to bring him to the ED, as well as XR at SNF confirming fracture.  Differential includes but not limited to ICH, RUE fracture, syncopal event, stroke.  Initiating workup w/ basic labs, coags, CT head, and imaging of the hand/wrist.         APC / Resident Notes:   11:23 PM Labs so far revealing baseline anemia, subtherapeutic INR 1.4, K low at 3.0 (repleting).  Pending imaging workup.  1:06 AM All imaging normal.  No arm fractures or abnormal acute head CT changes.  Will discharge back to SNF.  Will prescribe 5d of PO potassium chloride and instructed to follow up w/ PCP.  Reassured daughter of normal workup and to return to ED or PCP if concern for worsening symptoms or repeat fall.                 Clinical Impression:       ICD-10-CM ICD-9-CM   1. Fall, initial encounter W19.XXXA E888.9   2. Fracture T14.8XXA 829.0   3. Arm pain M79.603 729.5                                Norman Martinez MD  Resident  05/09/19 0109

## 2019-05-09 NOTE — TELEPHONE ENCOUNTER
----- Message from Alycia Beckwith sent at 5/9/2019  9:42 AM CDT -----  Contact: David   Name of Who is Calling:David     What is the request in detail: [facility] states patient has a new fracture and needs to be seen sooner first available 05/13/2019 Please contact to further discuss and advise    Can the clinic reply by [facility]: No    What Number to Call Back if not in [facility]: [phone]

## 2019-05-09 NOTE — ED NOTES
Patient identifiers for Micheal Hunt 78 y.o. male checked and correct.  Chief Complaint   Patient presents with    Fall     Pt to ER via EMS from [facility]. Pt had a fall yesterday and hit his head. He is taking coumadin. Unsure of LOC or how pt fell. He has right hand fracture. Staff states that pt is more slow to respond today. Pt is drowsy upon arrival. Fam states patient had dialysis today and it is normal for him to be more tired. He is disoriented to time which is his norm. Fam states recent pneumonia, stroke and MI in march.      Past Medical History:   Diagnosis Date    NICK (acute kidney injury) 7/29/2016    Allergy     Anemia, mild 12/15/2014    Arthritis     Gout    Benign essential HTN 3/27/2012    BMI 29.0-29.9,adult 5/10/2018    BPH (benign prostatic hyperplasia)     BPH (benign prostatic hyperplasia)     CAD (coronary artery disease) 2006    Chronic kidney disease     due to ibuprofen    Colon polyp     CRF (chronic renal failure), stage 5     Diverticulosis     Gastritis     GERD (gastroesophageal reflux disease)     Gout     History of colon polyps 5/3/2018    HTN (hypertension) 3/27/2012    Hyperlipidemia     Hyperlipidemia     LLL pneumonia 6/14/2018    LVH (left ventricular hypertrophy)     Mesenteric ischemia     Murmur, cardiac 3/27/2012    GRICELDA (obstructive sleep apnea)     DOES NOT USE A MACHINE    Sinus problem     Syncope and collapse      Allergies reported:   Review of patient's allergies indicates:   Allergen Reactions    Ace inhibitors Other (See Comments)     Cough    Arb-angiotensin receptor antagonist Itching    Eplerenone Other (See Comments)     Marked bradycardia, 40, tiredness and weakness      Sulfa (sulfonamide antibiotics) Itching     Patient says this was 10 years ago and doesn't remember what happened         LOC: Patient is lethargic and snoring. Will awaken to voice. Patient is oriented to person, place, and situation, but not  "time (family states this is normal).  APPEARANCE: Patient resting comfortably and in no acute distress. Patient is clean and well groomed, patient's clothing is properly fastened.  SKIN: The skin is warm and dry. Patient has normal skin turgor and moist mucus membranes. Small skin tears to his right leg. Fistula to his left upper arm. Swelling noted to the right hand where fracture was reported. Slight purple bruising to the right side of patient's cheek.   MUSKULOSKELETAL: Patient is moving all extremities well, no obvious deformities noted. Pulses intact. Family reports right-sided weakness after stroke in march. Patient is bed bound.   RESPIRATORY: Airway is open and patent. Respirations are spontaneous and non-labored with normal effort and rate. Patient SOB with exertion. Oxygen saturation drops when snoring, but then returns to 100% on room air.   CARDIAC: Patient has a normal rate and rhythm. A-fib, 89 bpm cardiac monitor, No peripheral edema noted.   ABDOMEN: No distention noted. Soft and non-tender upon palpation. Patient on dialysis. Last treatment today.  NEUROLOGICAL: Facial expression symmetrical. Family reports patient is "slower" today and states that as day went on he wasn't make as much sense.          "

## 2019-05-09 NOTE — ED NOTES
Called Elia for pt transport back to [facility] on [address]. States they will be here within the hour.

## 2019-05-09 NOTE — ED NOTES
While attempting to get xrays, pt became extremely agitated. Unable to finish imaging at this time - MD aware. Will wait until patient calms down and reattempt.

## 2019-05-09 NOTE — TELEPHONE ENCOUNTER
----- Message from Abbey Rivero sent at 5/9/2019  2:24 PM CDT -----  Contact: Roshan luna/ [facility] [phone] ask for 4th floor  Roshan is calling to change patient's appt due to his dialysis schedule.

## 2019-05-09 NOTE — ED TRIAGE NOTES
Patient presents to the ED after a fall that he reports occurred yesterday; fracture to his right hand. Patient states he is tired after having dialysis today.

## 2019-05-09 NOTE — TELEPHONE ENCOUNTER
----- Message from Maya Bocanegra MA sent at 5/9/2019 10:34 AM CDT -----  Contact: David Us a pt of Dr Raya.   Thanks Maya  ----- Message -----  From: Alycia Beckwith  Sent: 5/9/2019   9:42 AM  To: Jose Corbin Staff    Name of Who is Calling:David     What is the request in detail: [facility] states patient has a new fracture and needs to be seen sooner first available 05/13/2019 Please contact to further discuss and advise    Can the clinic reply by [facility]: No    What [facility]: [phone]

## 2019-05-09 NOTE — TELEPHONE ENCOUNTER
Spoke to the nursing home appointment offered today and they can not get him here appointment given for Monday.

## 2019-05-14 ENCOUNTER — TELEPHONE (OUTPATIENT)
Dept: ENDOSCOPY | Facility: HOSPITAL | Age: 79
End: 2019-05-14

## 2019-05-14 ENCOUNTER — TELEPHONE (OUTPATIENT)
Dept: ORTHOPEDICS | Facility: CLINIC | Age: 79
End: 2019-05-14

## 2019-05-14 ENCOUNTER — OFFICE VISIT (OUTPATIENT)
Dept: ORTHOPEDICS | Facility: CLINIC | Age: 79
End: 2019-05-14
Payer: MEDICARE

## 2019-05-14 DIAGNOSIS — S63.501A SPRAIN OF RIGHT WRIST, INITIAL ENCOUNTER: ICD-10-CM

## 2019-05-14 PROCEDURE — 99999 PR PBB SHADOW E&M-EST. PATIENT-LVL III: CPT | Mod: PBBFAC,,, | Performed by: ORTHOPAEDIC SURGERY

## 2019-05-14 PROCEDURE — 99213 PR OFFICE/OUTPT VISIT, EST, LEVL III, 20-29 MIN: ICD-10-PCS | Mod: S$PBB,,, | Performed by: ORTHOPAEDIC SURGERY

## 2019-05-14 PROCEDURE — 99213 OFFICE O/P EST LOW 20 MIN: CPT | Mod: PBBFAC,PN | Performed by: ORTHOPAEDIC SURGERY

## 2019-05-14 PROCEDURE — 99999 PR PBB SHADOW E&M-EST. PATIENT-LVL III: ICD-10-PCS | Mod: PBBFAC,,, | Performed by: ORTHOPAEDIC SURGERY

## 2019-05-14 PROCEDURE — 99213 OFFICE O/P EST LOW 20 MIN: CPT | Mod: S$PBB,,, | Performed by: ORTHOPAEDIC SURGERY

## 2019-05-14 NOTE — PROGRESS NOTES
Subjective:      Patient ID: Micheal Hunt is a 78 y.o. male.  Chief Complaint: Injury of the Right Forearm; Injury of the Right Wrist; and Injury of the Right Hand      HPI  Micheal Hunt is a  78 y.o. male presenting today for follow up of right wrist injury.  He reports that he is doing well with the left arm but he had a fall recently and landed on his right arm  He was seen in the emergency room x-rays negative for fracture elbow wrist and fingers  He is currently in a nursing home recovering from a stroke on his right side  He has had some swelling in the right hand wrist since the fall.    Review of patient's allergies indicates:   Allergen Reactions    Ace inhibitors Other (See Comments)     Cough    Arb-angiotensin receptor antagonist Itching    Eplerenone Other (See Comments)     Marked bradycardia, 40, tiredness and weakness      Sulfa (sulfonamide antibiotics) Itching and Swelling     Patient says this was 10 years ago and doesn't remember what happened         Current Outpatient Medications   Medication Sig Dispense Refill    albuterol (PROVENTIL/VENTOLIN HFA) 90 mcg/actuation inhaler 2 puffs every 4 hours as needed for cough, wheeze, or shortness of breath 3 Inhaler 3    albuterol-ipratropium (DUO-NEB) 2.5 mg-0.5 mg/3 mL nebulizer solution Take 3 mLs by nebulization every 6 (six) hours as needed for Wheezing or Shortness of Breath. 270 mL 0    allopurinol (ZYLOPRIM) 100 MG tablet Take 100 mg by mouth once daily.      amLODIPine (NORVASC) 10 MG tablet Take 10 mg by mouth once daily.      aspirin (ECOTRIN) 81 MG EC tablet Take 81 mg by mouth once daily.        atorvastatin (LIPITOR) 80 MG tablet TAKE 1 TABLET (80 MG TOTAL) BY MOUTH ONCE DAILY. 90 tablet 3    budesonide-formoterol 160-4.5 mcg (SYMBICORT) 160-4.5 mcg/actuation HFAA Inhale 2 puffs into the lungs every 12 (twelve) hours. Controller 3 Inhaler 4    carvedilol (COREG) 25 MG tablet Take 1 tablet (25 mg total) by mouth  2 (two) times daily with meals. 60 tablet 11    celecoxib (CELEBREX) 200 MG capsule Take 1 capsule (200 mg total) by mouth once daily. 30 capsule 3    doxycycline (VIBRA-TABS) 100 MG tablet Take 1 tablet (100 mg total) by mouth every 12 (twelve) hours. 2 tablet 0    ferrous sulfate (FEOSOL) 325 mg (65 mg iron) Tab tablet Take 1 tablet (325 mg total) by mouth twice daily three times a week. On dialysis days  0    finasteride (PROSCAR) 5 mg tablet TAKE 1 TABLET ONCE DAILY. 90 tablet 3    fluticasone (FLONASE) 50 mcg/actuation nasal spray 2 sprays (100 mcg total) by Each Nare route once daily. 3 Bottle 3    gabapentin (NEURONTIN) 300 MG capsule Take 1 capsule (300 mg total) by mouth every evening. 90 capsule 3    losartan (COZAAR) 100 MG tablet Take 1 tablet (100 mg total) by mouth every evening. 90 tablet 3    montelukast (SINGULAIR) 10 mg tablet Take 10 mg by mouth every evening.      NITROSTAT 0.4 mg SL tablet PLACE 1 TABLET (0.4 MG TOTAL) UNDER THE TONGUE EVERY 5 (FIVE) MINUTES AS NEEDED FOR CHEST PAIN. 25 tablet 3    pantoprazole (PROTONIX) 40 mg GrPS Take 1 packet (40 mg total) by mouth once daily. 30 packet 11    potassium chloride (KLOR-CON) 10 MEQ TbSR Take 1 tablet (10 mEq total) by mouth once daily. for 5 days 5 tablet 0    QUEtiapine (SEROQUEL) 25 MG Tab Take 1 tablet (25 mg total) by mouth nightly as needed (unredirectable agitation). 30 tablet 0    ramelteon (ROZEREM) 8 mg tablet Take 1 tablet (8 mg total) by mouth nightly as needed for Insomnia. 30 tablet 0    tamsulosin (FLOMAX) 0.4 mg Cap TAKE 1 CAPSULE BY MOUTH EVERY DAY 30 capsule 2    tiotropium bromide (SPIRIVA RESPIMAT) 1.25 mcg/actuation Mist Inhale 2.5 mcg into the lungs once daily. Controller 4 g 5    torsemide (DEMADEX) 20 MG Tab Take 1 tablet (20 mg total) by mouth 2 (two) times daily. 60 tablet 1    warfarin (COUMADIN) 5 MG tablet Take 1 tablet (5 mg total) by mouth every Wednesday. 4 tablet 11    warfarin (COUMADIN) 5 MG  tablet Take 1 tablet (5 mg total) by mouth every Sunday. 4 tablet 11    warfarin (COUMADIN) 7.5 MG tablet Take 1 tablet (7.5 mg total) by mouth every Monday, Tuesday, Thursday, Saturday. 16 tablet 11     No current facility-administered medications for this visit.        Past Medical History:   Diagnosis Date    NICK (acute kidney injury) 7/29/2016    Allergy     Anemia, mild 12/15/2014    Arthritis     Gout    Benign essential HTN 3/27/2012    BMI 29.0-29.9,adult 5/10/2018    BPH (benign prostatic hyperplasia)     BPH (benign prostatic hyperplasia)     CAD (coronary artery disease) 2006    Chronic kidney disease     due to ibuprofen    Colon polyp     CRF (chronic renal failure), stage 5     Diverticulosis     Gastritis     GERD (gastroesophageal reflux disease)     Gout     History of colon polyps 5/3/2018    HTN (hypertension) 3/27/2012    Hyperlipidemia     Hyperlipidemia     LLL pneumonia 6/14/2018    LVH (left ventricular hypertrophy)     Mesenteric ischemia     Murmur, cardiac 3/27/2012    GRICELDA (obstructive sleep apnea)     DOES NOT USE A MACHINE    Sinus problem     Syncope and collapse        Past Surgical History:   Procedure Laterality Date    APPENDECTOMY      BLOCK-NERVE Left 5/31/2016    [facility] at [facility] OR    CARDIAC CATHETERIZATION      COLONOSCOPY  2011    COLONOSCOPY N/A 5/3/2018    [facility]    COLONOSCOPY N/A 9/10/2015    [facility]    CORONARY ARTERY BYPASS GRAFT  4/2007    x 1    CYSTOSCOPY N/A 8/30/2017    [facility] at [facility] OR    CYSTOSCOPY N/A 11/10/2015    [facility]    ESOPHAGOGASTRODUODENOSCOPY (EGD) N/A 1/4/2016    [facility] (2ND F[facility]    ESOPHAGOGASTRODUODENOSCOPY (EGD) N/A 10/15/2014    [facility] at [facility]     INJECTION-STEROID-EPIDURAL-TRANSFORAMINAL Left 2/25/2016    [facility]    JOINT REPLACEMENT      left knee total replacement  X 3    mid leftt finger      from a cactuss    MOLE REMOVAL  2016    RADIOFREQUENCY THERMOCOAGULATION (RFTC)-NERVE-MEDIAN BRANCH-LUMBAR Left 4/11/2016    [facility] at [facility] OR    rotative cuff      no rotative cuffs on bilat shoulders has pins     SHOULDER SURGERY      shoulder surgery bilat  RIGHT X 4; LEFT X 3    TRANSRECTAL ULTRASOUND GUIDED PROSTATE BIOPSY Bilateral 11/10/2015    [facility] at [facility] OR    ULTRASOUND-ENDOSCOPIC-UPPER N/A 5/31/2017    [facility] ENDO (2ND FLR)    ULTRASOUND-ENDOSCOPIC-UPPER N/A 1/4/2016    Per[facility] by Tra [facility] at [facility] ENDO (2ND FLR)    ULTRASOUND-ENDOSCOPIC-UPPER N/A 1/16/2015    Performed by Jason Saleem MD at [facility] ENDO (2ND FLR)       OBJECTIVE:   PHYSICAL EXAM:       There were no vitals filed for this visit.  Ortho/SPM Exam  Examination right hand and wrist there is diffuse swelling mild tenderness no bruising he has limited motion has sort of a wrist drop although he does have some active wrist and finger extension   strength is decreased sensation intact no tenderness elbow or shoulder but limited range of motion elbow and shoulder also due to hemiparesis      RADIOGRAPHS:  X-rays of the upper extremity reviewed from the emergency room demonstrating no fracture or dislocation  Comments: I have personally reviewed the imaging and I agree with the above radiologist's report.    ASSESSMENT/PLAN:     IMPRESSION:  1.  Hemiparesis right arm related to a recent stroke.  2.  Sprain right hand wrist related to fall    PLAN:  I have given him a wrist brace for the right hand and wrist recommended part-time use  I have also ordered some therapy at the nursing home for the right hand and wrist      FOLLOW UP:  3-4 weeks    Disclaimer: This  note has been generated using voice-recognition software. There may be typographical errors that have been missed during proof-reading.

## 2019-05-14 NOTE — TELEPHONE ENCOUNTER
----- Message from Rose Henry sent at 5/14/2019  9:26 AM CDT -----  Contact: Self- [phone]  Cecilia- pt returning missed call to schedule EUS- please contact pt at [phone]

## 2019-05-16 ENCOUNTER — PATIENT MESSAGE (OUTPATIENT)
Dept: NEUROLOGY | Facility: CLINIC | Age: 79
End: 2019-05-16

## 2019-05-16 ENCOUNTER — TELEPHONE (OUTPATIENT)
Dept: INTERNAL MEDICINE | Facility: CLINIC | Age: 79
End: 2019-05-16

## 2019-05-16 ENCOUNTER — OFFICE VISIT (OUTPATIENT)
Dept: OPHTHALMOLOGY | Facility: CLINIC | Age: 79
End: 2019-05-16
Payer: MEDICARE

## 2019-05-16 ENCOUNTER — OFFICE VISIT (OUTPATIENT)
Dept: NEUROLOGY | Facility: CLINIC | Age: 79
End: 2019-05-16
Payer: MEDICARE

## 2019-05-16 ENCOUNTER — PATIENT MESSAGE (OUTPATIENT)
Dept: FAMILY MEDICINE | Facility: CLINIC | Age: 79
End: 2019-05-16

## 2019-05-16 VITALS
HEIGHT: 74 IN | DIASTOLIC BLOOD PRESSURE: 125 MMHG | WEIGHT: 208 LBS | SYSTOLIC BLOOD PRESSURE: 175 MMHG | BODY MASS INDEX: 26.69 KG/M2 | HEART RATE: 66 BPM

## 2019-05-16 DIAGNOSIS — Z86.73 HISTORY OF STROKE: Primary | ICD-10-CM

## 2019-05-16 DIAGNOSIS — I69.398 RIGHT HOMONYMOUS HEMIANOPSIA DUE TO RECENT CEREBRAL INFARCTION: ICD-10-CM

## 2019-05-16 DIAGNOSIS — F32.A DEPRESSION, UNSPECIFIED DEPRESSION TYPE: ICD-10-CM

## 2019-05-16 DIAGNOSIS — I63.332 THROMBOTIC STROKE INVOLVING LEFT POSTERIOR CEREBRAL ARTERY: Primary | ICD-10-CM

## 2019-05-16 DIAGNOSIS — I66.22: Primary | ICD-10-CM

## 2019-05-16 DIAGNOSIS — G93.40 ENCEPHALOPATHY: ICD-10-CM

## 2019-05-16 DIAGNOSIS — N18.5 CRF (CHRONIC RENAL FAILURE), STAGE 5: ICD-10-CM

## 2019-05-16 DIAGNOSIS — E78.49 OTHER HYPERLIPIDEMIA: ICD-10-CM

## 2019-05-16 DIAGNOSIS — I10 ESSENTIAL HYPERTENSION: ICD-10-CM

## 2019-05-16 DIAGNOSIS — I51.89 DIASTOLIC DYSFUNCTION: ICD-10-CM

## 2019-05-16 DIAGNOSIS — I65.23 BILATERAL CAROTID ARTERY STENOSIS: ICD-10-CM

## 2019-05-16 DIAGNOSIS — H51.22 INO (INTERNUCLEAR OPHTHALMOPLEGIA), LEFT: ICD-10-CM

## 2019-05-16 DIAGNOSIS — S09.8XXD BLUNT HEAD TRAUMA, SUBSEQUENT ENCOUNTER: ICD-10-CM

## 2019-05-16 DIAGNOSIS — Z79.01 LONG TERM (CURRENT) USE OF ANTICOAGULANTS: ICD-10-CM

## 2019-05-16 DIAGNOSIS — I48.19 PERSISTENT ATRIAL FIBRILLATION: ICD-10-CM

## 2019-05-16 DIAGNOSIS — I70.0 AORTIC CALCIFICATION: ICD-10-CM

## 2019-05-16 DIAGNOSIS — H53.461 RIGHT HOMONYMOUS HEMIANOPSIA DUE TO RECENT CEREBRAL INFARCTION: ICD-10-CM

## 2019-05-16 PROCEDURE — 99999 PR PBB SHADOW E&M-EST. PATIENT-LVL III: CPT | Mod: PBBFAC,,, | Performed by: PSYCHIATRY & NEUROLOGY

## 2019-05-16 PROCEDURE — 99999 PR PBB SHADOW E&M-EST. PATIENT-LVL III: CPT | Mod: PBBFAC,,, | Performed by: OPHTHALMOLOGY

## 2019-05-16 PROCEDURE — 99999 PR PBB SHADOW E&M-EST. PATIENT-LVL III: ICD-10-PCS | Mod: PBBFAC,,, | Performed by: OPHTHALMOLOGY

## 2019-05-16 PROCEDURE — 99214 OFFICE O/P EST MOD 30 MIN: CPT | Mod: S$PBB,,, | Performed by: PSYCHIATRY & NEUROLOGY

## 2019-05-16 PROCEDURE — 92004 PR EYE EXAM, NEW PATIENT,COMPREHESV: ICD-10-PCS | Mod: S$PBB,,, | Performed by: OPHTHALMOLOGY

## 2019-05-16 PROCEDURE — 99999 PR PBB SHADOW E&M-EST. PATIENT-LVL III: ICD-10-PCS | Mod: PBBFAC,,, | Performed by: PSYCHIATRY & NEUROLOGY

## 2019-05-16 PROCEDURE — 99213 OFFICE O/P EST LOW 20 MIN: CPT | Mod: PBBFAC | Performed by: OPHTHALMOLOGY

## 2019-05-16 PROCEDURE — 99213 OFFICE O/P EST LOW 20 MIN: CPT | Mod: PBBFAC,27 | Performed by: PSYCHIATRY & NEUROLOGY

## 2019-05-16 PROCEDURE — 99214 PR OFFICE/OUTPT VISIT, EST, LEVL IV, 30-39 MIN: ICD-10-PCS | Mod: S$PBB,,, | Performed by: PSYCHIATRY & NEUROLOGY

## 2019-05-16 PROCEDURE — 92004 COMPRE OPH EXAM NEW PT 1/>: CPT | Mod: S$PBB,,, | Performed by: OPHTHALMOLOGY

## 2019-05-16 RX ORDER — ACETAMINOPHEN, DIPHENHYDRAMINE HCL, PHENYLEPHRINE HCL 325; 25; 5 MG/1; MG/1; MG/1
10 TABLET ORAL NIGHTLY
Status: ON HOLD | COMMUNITY
End: 2020-01-01

## 2019-05-16 RX ORDER — POLYETHYLENE GLYCOL 3350 17 G/17G
17 POWDER, FOR SOLUTION ORAL DAILY
Status: ON HOLD | COMMUNITY
End: 2020-01-01

## 2019-05-16 RX ORDER — FUROSEMIDE 20 MG/1
20 TABLET ORAL 2 TIMES DAILY
COMMUNITY
End: 2020-01-01

## 2019-05-16 NOTE — PROGRESS NOTES
HPI     Referred by Outpatient therapy  Hx of stroke. Seen Neurologist this morning.  Patient here w/sister and daughter which states had an stroke in 03/2019   in hospital.  Daughter states  vision seem to have decrease since stroke.  Pt states vision OD seem low/high.  Patching the OD in therapy.     I have personally interviewed the patient, reviewed the history and   examined the patient and agree with the technician's exam.    Last edited by Saul Ingram MD on 5/16/2019 10:20 AM. (History)            Assessment /Plan     For exam results, see Encounter Report.    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 His hemianopia is related to the left occipital stroke. I will repeat his exam in three months to monitor for any improvement in his scotoma.

## 2019-05-16 NOTE — LETTER
[facility] - Ophthalmology  [address]  [facility] [address]  Phone: [phone]  Fax: [phone]   May 16, 2019    Pk Lakhani MD  [address] [facility] [address]    Patient: Micheal Hunt   MR Number: [id number]   YOB: 1940   Date of Visit: 5/16/2019       Dear Dr. Lakhani:    Thank you for referring Micheal Hunt to me for evaluation. Here is my assessment and plan of care:    Assessment:   /Plan     For exam results, see Encounter Report.    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 His hemianopia is related to the left occipital stroke. I will repeat his exam in three months to monitor for any improvement in his scotoma.          Plan:       For exam results, see Encounter Report.    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 His hemianopia is related to the left occipital stroke. I will repeat his exam in three months to monitor for any improvement in his scotoma.            Below you will find my full exam findings. If you have questions, please do not hesitate to call me. I look forward to following Mr. Micheal Hunt along with you.    Sincerely,          Saul Ingram MD       CC  Micheal Vasquez MD             Base Eye Exam     Visual Acuity (Snellen - Linear)       Right Left    Dist sc 20/60 -1 20/200          Tonometry (Tonopen, 10:44 AM)       Right Left    Pressure 22 22          Pupils       Dark Light Shape React APD    Right 4 2 Round Brisk None    Left 4 2 Round Brisk None          Visual Fields       Right Left    Restrictions Total  superior nasal, inferior nasal deficiencies Total superior temporal, inferior temporal deficiencies   Complete right homonymous hemianopia           Extraocular Movement       Right Left     -- -- --   --  --   -- -- --    -- -- --   +2  --   -- -- --      Left exotropia worse on right gaze. Can converge with left eye.           Neuro/Psych     Oriented x3:  Yes    Mood/Affect:  Normal          Dilation     Both eyes:  2.5% Phenylephrine, 1% Mydriacyl @ 10:44 AM            Slit Lamp and Fundus Exam     External Exam       Right Left    External Normal Normal          Slit Lamp Exam       Right Left    Lids/Lashes Normal Normal    Conjunctiva/Sclera White and quiet White and quiet    Cornea Clear Clear    Anterior Chamber Deep and quiet Deep and quiet    Iris Round and reactive Round and reactive    Lens 1+ Cortical cataract, 1+ Nuclear sclerosis 1+ Cortical cataract, 1+ Nuclear sclerosis    Vitreous Normal Normal          Fundus Exam       Right Left    Disc Normal Normal    C/D Ratio 0.3 0.3    Macula Normal Normal    Vessels Normal Normal    Periphery Normal Normal

## 2019-05-16 NOTE — ASSESSMENT & PLAN NOTE
Etiology: Undetermined - Unclassified (>1 cause) large artery atherosclerosis vs. atrial fibrillation  BISHNU Major: severe ICAD and ECAD -- CE Major:  a.fib --  Absent -- Other Absent   · Diagnostic Orders: none  · Secondary stroke prevention: Continue coumadin INR goal 2-3  · Continue current statin therapy    · Blood pressure goal < 150/90 consistently as goal if tolerated; need several weeks of BP recordings before any changes; only on carvedilol 3.25   · Stroke Risk Factors Addressed: A.fib. Intra/extranial atherosclerotic disease including anterior and posterior circulations, HTN, HLD  · Stroke education administered  · Associated Factors  · POST STROKE DEPRESSION - recommend starting Lexapro at lowest dose; if this leads to severe side effects or drowsiness, can first discontinue seroquel if getting this nightly, or discontinue lexapro altogether; would only adjust one at a time  · POST STROKE FATIGUE - we discussed thalamic involvement and posterior circulation being a part of the issue, in addition to general post stroke fatigue, history of CPAP not treated (if we can address this it would likely benefit daytime sleepiness) in addition to his medication usage such as seroquel at night

## 2019-05-16 NOTE — PROGRESS NOTES
Vascular Neurology  Clinic Note    ___________________  ASSESSMENT & PLAN    Problem List Items Addressed This Visit        1 - High    History of stroke    Current Assessment & Plan     Etiology: Undetermined - Unclassified (>1 cause) large artery atherosclerosis vs. atrial fibrillation  BISHNU Major: severe ICAD and ECAD -- CE Major:  a.fib --  Absent -- Other Absent   · Diagnostic Orders: none  · Secondary stroke prevention: Continue coumadin INR goal 2-3  · Continue current statin therapy    · Blood pressure goal < 150/90 consistently as goal if tolerated; need several weeks of BP recordings before any changes; only on carvedilol 3.25   · Stroke Risk Factors Addressed: A.fib. Intra/extranial atherosclerotic disease including anterior and posterior circulations, HTN, HLD  · Stroke education administered  · Associated Factors  · POST STROKE DEPRESSION - recommend starting Lexapro at lowest dose; if this leads to severe side effects or drowsiness, can first discontinue seroquel if getting this nightly, or discontinue lexapro altogether; would only adjust one at a time  · POST STROKE FATIGUE - we discussed thalamic involvement and posterior circulation being a part of the issue, in addition to general post stroke fatigue, history of CPAP not treated (if we can address this it would likely benefit daytime sleepiness) in addition to his medication usage such as seroquel at night              Unprioritized    Carotid artery stenosis - Primary    Essential hypertension    Hyperlipidemia    Aortic calcification    Overview     Defer to primary control of cholesterol and bp.             Persistent atrial fibrillation    Long term (current) use of anticoagulants          Reason For Visit (Chief Complaint): L PCA stroke     HPI: 78 y.o. right handed male with inpatient stroke code 3/24/19 for right hemiparesis, aphasia and right hemianopsia.  The aforementioned symptoms have never happened prior to the event. There are no  identified triggers or modifying factors. There have been no recurrent events. There are no other associated symptoms.    History of atrial fibrillation and multiple areas of extracranial atherosclerotic disease and intracranial atherosclerotic disease.    Patient's daughter and ex-wife is present and provided a majority of the history today. Various concerns and questions were addressed during this visit, particularly pertaining to his vision deficit, history of noncompliance with CPAP and usage of antidepressant vs. Antipsychotic for mood and relationship to drowsiness in AM.      Brain Imaging:  MRI 3/26 - left PCA infarct involving thalamus and occipital lobe  Vessel Imaging:  CTA MP 3/24 - L P2 high grade stenosis/calcified thrombus, as well as left vertebral artery; bilateral supraclinoid/cavvernous ICA stenosis, R ICA 70% , left ICA 50-70%  Cardiac Evaluation:  TTE 3/23  · Eccentric left ventricular hypertrophy.Normal left ventricular systolic function. The estimated ejection fraction is 55%  · Severe left atrial enlargement.  · Indeterminate left ventricular diastolic function.  · Moderate aortic valve stenosis. Aortic valve area is 1.37 cm2; peak velocity is 2.78 m/s; mean gradient is 22.39 mmHg. Mild aortic regurgitation.  · Mild-to-moderate mitral regurgitation.  · Mild to moderate tricuspid regurgitation.  · The estimated PA systolic pressure is 41 mm Hg  · Severe right atrial enlargement.  · Normal right ventricular systolic function. The right ventricle is mildly dilated.  · Patent foramen ovale present with left to right shunting indicated by color flow Doppler.  · Normal central venous pressure (3 mm Hg).  · Pulmonary hypertension present.  Other:     Relevant Labwork:  Recent Labs   Lab 07/26/18  0730  03/21/19  0456   HEMOGLOBIN A1C  --    < > 8.3 H   LDL CHOLESTEROL 46.4 L  --   --    HDL 29 L  --   --    TRIGLYCERIDES 68  --   --    CHOLESTEROL 89 L  --   --     < > = values in this interval not  displayed.       I independently viewed the above imaging studies in addition to reviewing the report.  I reviewed the above labwork.    Review of Systems  Msk: negative for muscle pain  Skin: negative for pruritis  Neuro: negative for headache  All others negative    Past Medical History  Past Medical History:   Diagnosis Date    NICK (acute kidney injury) 7/29/2016    Allergy     Anemia, mild 12/15/2014    Arthritis     Gout    Benign essential HTN 3/27/2012    BMI 29.0-29.9,adult 5/10/2018    BPH (benign prostatic hyperplasia)     BPH (benign prostatic hyperplasia)     CAD (coronary artery disease) 2006    Chronic kidney disease     due to ibuprofen    Colon polyp     CRF (chronic renal failure), stage 5     Diverticulosis     Gastritis     GERD (gastroesophageal reflux disease)     Gout     History of colon polyps 5/3/2018    HTN (hypertension) 3/27/2012    Hyperlipidemia     Hyperlipidemia     LLL pneumonia 6/14/2018    LVH (left ventricular hypertrophy)     Mesenteric ischemia     Murmur, cardiac 3/27/2012    GRICELDA (obstructive sleep apnea)     DOES NOT USE A MACHINE    Sinus problem     Syncope and collapse      Family History  No relevant history   Social History  Never Smoker     Medication List with Changes/Refills   Current Medications    ALBUTEROL (PROVENTIL/VENTOLIN HFA) 90 MCG/ACTUATION INHALER    2 puffs every 4 hours as needed for cough, wheeze, or shortness of breath    ALBUTEROL-IPRATROPIUM (DUO-NEB) 2.5 MG-0.5 MG/3 ML NEBULIZER SOLUTION    Take 3 mLs by nebulization every 6 (six) hours as needed for Wheezing or Shortness of Breath.    ALLOPURINOL (ZYLOPRIM) 100 MG TABLET    Take 100 mg by mouth once daily.    AMLODIPINE (NORVASC) 10 MG TABLET    Take 10 mg by mouth once daily.    ASPIRIN (ECOTRIN) 81 MG EC TABLET    Take 81 mg by mouth once daily.      ATORVASTATIN (LIPITOR) 80 MG TABLET    TAKE 1 TABLET (80 MG TOTAL) BY MOUTH ONCE DAILY.    BUDESONIDE-FORMOTEROL 160-4.5  MCG (SYMBICORT) 160-4.5 MCG/ACTUATION HFAA    Inhale 2 puffs into the lungs every 12 (twelve) hours. Controller    CARVEDILOL (COREG) 25 MG TABLET    Take 1 tablet (25 mg total) by mouth 2 (two) times daily with meals.    CELECOXIB (CELEBREX) 200 MG CAPSULE    Take 1 capsule (200 mg total) by mouth once daily.    DOXYCYCLINE (VIBRA-TABS) 100 MG TABLET    Take 1 tablet (100 mg total) by mouth every 12 (twelve) hours.    FERROUS SULFATE (FEOSOL) 325 MG (65 MG IRON) TAB TABLET    Take 1 tablet (325 mg total) by mouth twice daily three times a week. On dialysis days    FINASTERIDE (PROSCAR) 5 MG TABLET    TAKE 1 TABLET ONCE DAILY.    FLUTICASONE (FLONASE) 50 MCG/ACTUATION NASAL SPRAY    2 sprays (100 mcg total) by Each Nare route once daily.    FUROSEMIDE (LASIX) 20 MG TABLET    Take 20 mg by mouth 2 (two) times daily.    GABAPENTIN (NEURONTIN) 300 MG CAPSULE    Take 1 capsule (300 mg total) by mouth every evening.    LOSARTAN (COZAAR) 100 MG TABLET    Take 1 tablet (100 mg total) by mouth every evening.    MELATONIN 10 MG TAB    Take by mouth nightly.    MONTELUKAST (SINGULAIR) 10 MG TABLET    Take 10 mg by mouth every evening.    NITROSTAT 0.4 MG SL TABLET    PLACE 1 TABLET (0.4 MG TOTAL) UNDER THE TONGUE EVERY 5 (FIVE) MINUTES AS NEEDED FOR CHEST PAIN.    PANTOPRAZOLE (PROTONIX) 40 MG GRPS    Take 1 packet (40 mg total) by mouth once daily.    POLYETHYLENE GLYCOL (GLYCOLAX) 17 GRAM PWPK    Take 17 g by mouth.    QUETIAPINE (SEROQUEL) 25 MG TAB    Take 1 tablet (25 mg total) by mouth nightly as needed (unredirectable agitation).    RAMELTEON (ROZEREM) 8 MG TABLET    Take 1 tablet (8 mg total) by mouth nightly as needed for Insomnia.    TAMSULOSIN (FLOMAX) 0.4 MG CAP    TAKE 1 CAPSULE BY MOUTH EVERY DAY    TIOTROPIUM BROMIDE (SPIRIVA RESPIMAT) 1.25 MCG/ACTUATION MIST    Inhale 2.5 mcg into the lungs once daily. Controller    TORSEMIDE (DEMADEX) 20 MG TAB    Take 1 tablet (20 mg total) by mouth 2 (two) times daily.     "WARFARIN (COUMADIN) 5 MG TABLET    Take 1 tablet (5 mg total) by mouth every Wednesday.    WARFARIN (COUMADIN) 5 MG TABLET    Take 1 tablet (5 mg total) by mouth every .    WARFARIN (COUMADIN) 7.5 MG TABLET    Take 1 tablet (7.5 mg total) by mouth every Monday, Tuesday, Thursday, Saturday.       EXAM  Vital Signs:Blood pressure (!) 175/125, pulse 66, height 6' 2" (1.88 m), weight 94.3 kg (208 lb).   Manual check left /90 mmHg  General: well appearing without discomfort ; wearing eye patch in right eye  Mental Status:alert, does not get month or age right ;  Language: aphasic, able to follow simple commands ~ 50%, nonfluent  Cranial Nerves: EOMI, PERRL, R facial weaknes, tongue to midline, palate midline; R hemianopsia  Motor: R UE , normal tone  Sensory: intact light touch bilaterally, intact proprioception bilaterally  Coordination: no ataxia on finger-to-nose or heel-to-shin testing; no truncal ataxia  Gait & Stance: normal    NIH Stroke Scale:    Level of Consciousness: 0 - alert  LOC Questions: 2 - answers none correctly  LOC Commands: 0 - performs both correctly  Best Gaze: 0 - normal  Visual: 2 - complete hemianopia  Facial Palsy: 1 - minor  Motor Left Arm: 0 - no drift  Motor Right Arm: 3 - no effort against gravity  Motor Left Le - no drift  Motor Right Le - drift  Limb Ataxia: 0 - absent  Sensory: 1 - mild to moderate loss  Best Language: 1 - mild to moderate aphasia  Dysarthria: 1 - mild to moderate dysarthria  Extinction and Inattention: 0 - no neglect  NIH Stroke Scale Total: 12          MD Sarai  Vascular Neurology  Office [phone]  Fax [phone]    "

## 2019-05-16 NOTE — TELEPHONE ENCOUNTER
----- Message from Stuart Becker sent at 5/16/2019  1:52 PM CDT -----  Contact: Pt is FST  Pt's Daughter Tonya Hunt would like to be called back regarding pt having medical management and need an appt asap. Neurologist requested with chronic conditions. No further information was given.    Pt's Sister can be reached at [phone].    Thank You.

## 2019-05-17 NOTE — TELEPHONE ENCOUNTER
Referral to Internal Medicine placed by Dr. Lakhani per patient's request to establish with new PCP (Dr. Sanchez). E-mail forwarded to patient per Dr. Lakhani due to initial point of contact being via e-mail regarding this matter. Call placed to patient's daughter (Tonya) regarding. No answer received. Left message requesting return call to office. Also noted patient's daughter has been in communication with Neurology via e-mail.

## 2019-05-18 ENCOUNTER — PATIENT MESSAGE (OUTPATIENT)
Dept: FAMILY MEDICINE | Facility: CLINIC | Age: 79
End: 2019-05-18

## 2019-05-21 ENCOUNTER — TELEPHONE (OUTPATIENT)
Dept: ENDOSCOPY | Facility: HOSPITAL | Age: 79
End: 2019-05-21

## 2019-05-21 ENCOUNTER — HOSPITAL ENCOUNTER (OUTPATIENT)
Dept: CARDIOLOGY | Facility: CLINIC | Age: 79
Discharge: HOME | End: 2019-05-21
Payer: MEDICARE

## 2019-05-21 ENCOUNTER — TELEPHONE (OUTPATIENT)
Dept: INTERNAL MEDICINE | Facility: CLINIC | Age: 79
End: 2019-05-21

## 2019-05-21 ENCOUNTER — OFFICE VISIT (OUTPATIENT)
Dept: CARDIOLOGY | Facility: CLINIC | Age: 79
End: 2019-05-21
Payer: MEDICARE

## 2019-05-21 VITALS
HEIGHT: 72 IN | SYSTOLIC BLOOD PRESSURE: 184 MMHG | DIASTOLIC BLOOD PRESSURE: 85 MMHG | BODY MASS INDEX: 28.21 KG/M2 | HEART RATE: 73 BPM

## 2019-05-21 DIAGNOSIS — I12.0 HYPERTENSION DUE TO END STAGE RENAL DISEASE CAUSED BY TYPE 2 DIABETES MELLITUS, ON DIALYSIS: ICD-10-CM

## 2019-05-21 DIAGNOSIS — Z99.2 ESRD (END STAGE RENAL DISEASE) ON DIALYSIS: ICD-10-CM

## 2019-05-21 DIAGNOSIS — N18.6 ESRD (END STAGE RENAL DISEASE) ON DIALYSIS: ICD-10-CM

## 2019-05-21 DIAGNOSIS — I65.21 STENOSIS OF RIGHT CAROTID ARTERY: ICD-10-CM

## 2019-05-21 DIAGNOSIS — I25.10 CORONARY ARTERY DISEASE, ANGINA PRESENCE UNSPECIFIED, UNSPECIFIED VESSEL OR LESION TYPE, UNSPECIFIED WHETHER NATIVE OR TRANSPLANTED HEART: ICD-10-CM

## 2019-05-21 DIAGNOSIS — I25.10 CORONARY ARTERY DISEASE INVOLVING NATIVE CORONARY ARTERY OF NATIVE HEART WITHOUT ANGINA PECTORIS: ICD-10-CM

## 2019-05-21 DIAGNOSIS — E11.22 HYPERTENSION DUE TO END STAGE RENAL DISEASE CAUSED BY TYPE 2 DIABETES MELLITUS, ON DIALYSIS: ICD-10-CM

## 2019-05-21 DIAGNOSIS — Z99.2 HYPERTENSION DUE TO END STAGE RENAL DISEASE CAUSED BY TYPE 2 DIABETES MELLITUS, ON DIALYSIS: ICD-10-CM

## 2019-05-21 DIAGNOSIS — I48.19 PERSISTENT ATRIAL FIBRILLATION: ICD-10-CM

## 2019-05-21 DIAGNOSIS — I25.10 CORONARY ARTERY DISEASE, ANGINA PRESENCE UNSPECIFIED, UNSPECIFIED VESSEL OR LESION TYPE, UNSPECIFIED WHETHER NATIVE OR TRANSPLANTED HEART: Primary | ICD-10-CM

## 2019-05-21 DIAGNOSIS — Q21.12 PATENT FORAMEN OVALE: ICD-10-CM

## 2019-05-21 DIAGNOSIS — I63.532 CEREBROVASCULAR ACCIDENT (CVA) DUE TO OCCLUSION OF LEFT POSTERIOR CEREBRAL ARTERY: Primary | ICD-10-CM

## 2019-05-21 DIAGNOSIS — N18.6 HYPERTENSION DUE TO END STAGE RENAL DISEASE CAUSED BY TYPE 2 DIABETES MELLITUS, ON DIALYSIS: ICD-10-CM

## 2019-05-21 DIAGNOSIS — E11.8 TYPE 2 DIABETES MELLITUS WITH COMPLICATION, WITHOUT LONG-TERM CURRENT USE OF INSULIN: ICD-10-CM

## 2019-05-21 PROBLEM — I48.91 ATRIAL FIBRILLATION: Status: ACTIVE | Noted: 2019-03-24

## 2019-05-21 PROCEDURE — 93005 ELECTROCARDIOGRAM TRACING: CPT | Mod: PBBFAC | Performed by: INTERNAL MEDICINE

## 2019-05-21 PROCEDURE — 99215 PR OFFICE/OUTPT VISIT, EST, LEVL V, 40-54 MIN: ICD-10-PCS | Mod: S$PBB,,, | Performed by: INTERNAL MEDICINE

## 2019-05-21 PROCEDURE — 99999 PR PBB SHADOW E&M-EST. PATIENT-LVL III: CPT | Mod: PBBFAC,,, | Performed by: INTERNAL MEDICINE

## 2019-05-21 PROCEDURE — 99213 OFFICE O/P EST LOW 20 MIN: CPT | Mod: PBBFAC,25 | Performed by: INTERNAL MEDICINE

## 2019-05-21 PROCEDURE — 99215 OFFICE O/P EST HI 40 MIN: CPT | Mod: S$PBB,,, | Performed by: INTERNAL MEDICINE

## 2019-05-21 PROCEDURE — 99999 PR PBB SHADOW E&M-EST. PATIENT-LVL III: ICD-10-PCS | Mod: PBBFAC,,, | Performed by: INTERNAL MEDICINE

## 2019-05-21 PROCEDURE — 93010 EKG 12-LEAD: ICD-10-PCS | Mod: S$PBB,,, | Performed by: INTERNAL MEDICINE

## 2019-05-21 PROCEDURE — 93010 ELECTROCARDIOGRAM REPORT: CPT | Mod: S$PBB,,, | Performed by: INTERNAL MEDICINE

## 2019-05-21 RX ORDER — ESCITALOPRAM OXALATE 5 MG/1
5 TABLET ORAL DAILY
COMMUNITY
End: 2019-05-28

## 2019-05-21 RX ORDER — INSULIN ASPART 100 [IU]/ML
INJECTION, SOLUTION INTRAVENOUS; SUBCUTANEOUS
COMMUNITY
End: 2020-01-01 | Stop reason: ALTCHOICE

## 2019-05-21 NOTE — TELEPHONE ENCOUNTER
Call placed to patient/patient's daughter (Tonya). No answer received. Left message. 2nd attempt to reach by phone. E-mails also sent to patient.

## 2019-05-21 NOTE — TELEPHONE ENCOUNTER
----- Message from Ester Lyons sent at 2019 10:28 AM CDT -----  Contact: my chart  Micheal Hunt, 78 yrs, None    MRN:   [id number]  ::   1940  Lang:   English  Last BMI:   28.21 kg/m²  Pt Teja Status:   Teja  Prim Cvg::   MEDICARE/MEDICARE PART A & B  Cvg Last Verified Date:   2019  Patient Types:   [place]  PCP:   Primary Doctor No  Care Team:     Allergies:   Ace Inhibitors (Reaction: Other (See Comments))  ,   Arb-angiotensin Receptor Antagonist (Reaction: Itching)  ,   Eplerenone (Reaction: Other (See Comments))  ,   Sulfa (Sulfonamide Antibiotics) (Reaction: Itching, Swelling)  Patient Portal:   Active, 2019 4:43 PM  FYI  General   More Detail >>  Appointment Request  Micheal Dave Hunt  Sent: Thu May 16, 2019  1:33 PM  To: [facility]     Micheal Hunt  MRN: [id number] : 1940  Pt Work: [phone] Pt Home: [phone]  Entered: [phone]      Message     ----- Message from [facility], System Message sent at 2019  1:33 PM CDT -----    Appointment Request From: Micheal Hunt    With Provider: Daniel    Preferred Date Range: 2019 - 2019    Preferred Times: Tuesday Morning, Thursday Morning    Reason for visit: New Patient    Comments:  [facility] have all info. and prescription medications regulated. Need to move care from [facility] asap.

## 2019-05-21 NOTE — LETTER
May 21, 2019      Tammy Saucedo, NP  [address]  [facility] [address]           [facility]  [address]  [facility] [address]  Phone: [phone]          Patient: Micheal Hunt   MR Number: [id number]   YOB: 1940   Date of Visit: 5/21/2019       Dear Tammy Saucedo:    Thank you for referring Micheal Hunt to me for evaluation. Attached you will find relevant portions of my assessment and plan of care.    If you have questions, please do not hesitate to call me. I look forward to following Micheal Hunt along with you.    Sincerely,    Jayda Jackson MD    Enclosure  CC:  No Recipients    If you would like to receive this communication electronically, please contact [email or web address] or [phone] to request more information on [facility] access.    For providers and/or their staff who would like to refer a patient to [facility], please contact us through our one-stop-shop provider referral line, [facility] , at [phone].    If you feel you have received this communication in error or would no longer like to receive these types of communications, please e-mail [email or web address]

## 2019-05-21 NOTE — PROGRESS NOTES
"Subjective:   Patient ID:  Micheal Hunt is a 78 y.o. male who presents for evaluation of Cerebrovascular Accident (Hospital f/u 3/22/19); Severe persistent asthma with acute exacerbation; and Dizziness      HPI: He presents today to establish care following a recent hospital admission. He is accompanied by his wife and two "daughters." Another daughter is on the phone. He was previously followed by Dr. Bland in [place]. He has known CAD with previous CABG done in [place]. His last angiogram was in 2012 and showed a patent LIMA to LAD and SVG to RCA with occluded LM. He started on HD in November for ESRD. He was admitted to [facility] from 3/11-3/21 for shortness of breath, asthma and PNA. He had not been previously diagnosed with PNA.  He was readmitted on 3/18 and transferred to [facility] on 3/21 for LHC with concern of subclavian steal causing chest pain and shortness of breath. His admission ECG showed atrial fibrillation which was new. His troponins were mildly elevated and flat. Once transferred to [facility], he developed an acute left PCA stroke and was transferred to Neuro-ICU. He was started on coumadin. His echo done 3/19 showed and LVEF of 55% with mild to moderate MR and  PFO with left to right shunting. PASP was 41 with an RAP of 3. A carotid US showed a 60-70% FROYLAN stenosis and a LICA stenosis < 50%. Retrograde flow was noted in the left vertebral artery He is currently in rehab. He has ongonig hemiparesis of his right side, double vision and aphasia. His blood pressure readings have been elevated. Micheal Hunt denies any chest pain, shortness of breath, PND, orthopnea, palpitations, leg edema, or syncope. He has had two falls. He denies any dizziness or pain in his left arm with use.    ECG : Atrial fibrillation, LVH with repolarization abnormality.      Past Medical History:   Diagnosis Date    NICK (acute kidney injury) 7/29/2016    Allergy     Anemia, mild 12/15/2014    Arthritis     " Gout    Atrial fibrillation 03/2019    Benign essential HTN 3/27/2012    BMI 29.0-29.9,adult 5/10/2018    BPH (benign prostatic hyperplasia)     BPH (benign prostatic hyperplasia)     CAD (coronary artery disease) 2006    Carotid artery occlusion     60-70% FROYLAN, LICA < 50%    Chronic kidney disease     due to ibuprofen    Colon polyp     CRF (chronic renal failure), stage 5     Diabetes mellitus     Diverticulosis     ESRD (end stage renal disease) on dialysis     Gastritis     GERD (gastroesophageal reflux disease)     Gout     History of colon polyps 5/3/2018    HTN (hypertension) 3/27/2012    Hyperlipidemia     Hyperlipidemia     LLL pneumonia 6/14/2018    LVH (left ventricular hypertrophy)     Mesenteric ischemia     Murmur, cardiac 3/27/2012    GRICELDA (obstructive sleep apnea)     DOES NOT USE A MACHINE    Sinus problem     Stroke 03/2019    acute left PCA    Syncope and collapse        Past Surgical History:   Procedure Laterality Date    APPENDECTOMY      BLOCK-NERVE Left 5/31/2016    [facility]    CARDIAC CATHETERIZATION  2012    LIMA to LAD patent, SVG to RCA    COLONOSCOPY  2011    COLONOSCOPY N/A 5/3/2018    [facility]    COLONOSCOPY N/A 9/10/2015    [facility]    CORONARY ARTERY BYPASS GRAFT  4/2007    x 2 [place]    CYSTOSCOPY N/A 8/30/2017    [facility]    CYSTOSCOPY N/A 11/10/2015    [facility]    ESOPHAGOGASTRODUODENOSCOPY (EGD) N/A 1/4/2016    [facility] (2ND FLR)    ESOPHAGOGASTRODUODENOSCOPY (EGD) N/A 10/15/2014    [facility] END[facility]    INJECTION-STEROID-EPIDURAL-TRANSFORAMINAL Left 2/25/2016    [facility]    JOINT REPLACEMENT      left knee total replacement  X 3    mid leftt finger      from a  cactuss    MOLE REMOVAL  2016    RADIOFREQUENCY THERMOCOAGULATION (RFTC)-NERVE-MEDIAN BRANCH-LUMBAR Left 4/11/2016    [facility]    rotative cuff      no rotative cuffs on bilat shoulders has pins     SHOULDER SURGERY      shoulder surgery bilat  RIGHT X 4; LEFT X 3    TRANSRECTAL ULTRASOUND GUIDED PROSTATE BIOPSY Bilateral 11/10/2015    [facility]    ULTRASOUND-ENDOSCOPIC-UPPER N/A 5/31/2017    [facility] ENDO (2ND FLR)    ULTRASOUND-ENDOSCOPIC-UPPER N/A 1/4/2016    [facility] ENDO (2ND FLR)    ULTRASOUND-ENDOSCOPIC-UPPER N/A 1/16/2015    Performed by Jason Saleem [facility] at [facility] ENDO (2ND FLR)       Social History     Socioeconomic History    Marital status:      Spouse name: Not on file    Number of children: Not on file    Years of education: Not on file    Highest education level: Not on file   Occupational History    Not on file   Social Needs    Financial resource strain: Not on file    Food insecurity:     Worry: Not on file     Inability: Not on file    Transportation needs:     Medical: Not on file     Non-medical: Not on file   Tobacco Use    Smoking status: Never Smoker    Smokeless tobacco: Never Used   Substance and Sexual Activity    Alcohol use: No    Drug use: No    Sexual activity: Not on file   Lifestyle    Physical activity:     Days per week: Not on file     Minutes per session: Not on file    Stress: Not on file   Relationships    Social connections:     Talks on phone: Not on file     Gets together: Not on file     Attends Hoahaoism service: Not on file     Active member of club or organization: Not on file     Attends meetings of clubs or organizations: Not on file     Relationship status: Not on file   Other Topics Concern    Not on file   Social History Narrative    [place]; daughter nearby       Family History   Problem Relation  Age of Onset    Heart disease Mother     Sudden death Father     Cancer Father         advanced lung ca- found after 2 story fall    Stroke Sister     Pneumonia Sister          from PNA    Heart disease Sister     Hypertension Brother     Kidney cancer Neg Hx     Prostate cancer Neg Hx     Urolithiasis Neg Hx     Allergic rhinitis Neg Hx     Allergies Neg Hx     Angioedema Neg Hx     Asthma Neg Hx     Atopy Neg Hx     Eczema Neg Hx     Immunodeficiency Neg Hx     Rhinitis Neg Hx     Urticaria Neg Hx        Patient's Medications   New Prescriptions    No medications on file   Previous Medications    ALBUTEROL (PROVENTIL/VENTOLIN HFA) 90 MCG/ACTUATION INHALER    2 puffs every 4 hours as needed for cough, wheeze, or shortness of breath    ALBUTEROL-IPRATROPIUM (DUO-NEB) 2.5 MG-0.5 MG/3 ML NEBULIZER SOLUTION    Take 3 mLs by nebulization every 6 (six) hours as needed for Wheezing or Shortness of Breath.    ALLOPURINOL (ZYLOPRIM) 100 MG TABLET    Take 100 mg by mouth once daily.    AMLODIPINE (NORVASC) 10 MG TABLET    Take 10 mg by mouth once daily.    ASPIRIN (ECOTRIN) 81 MG EC TABLET    Take 81 mg by mouth once daily.      ATORVASTATIN (LIPITOR) 80 MG TABLET    TAKE 1 TABLET (80 MG TOTAL) BY MOUTH ONCE DAILY.    BUDESONIDE-FORMOTEROL 160-4.5 MCG (SYMBICORT) 160-4.5 MCG/ACTUATION HFAA    Inhale 2 puffs into the lungs every 12 (twelve) hours. Controller    CARVEDILOL (COREG) 25 MG TABLET    Take 1 tablet (25 mg total) by mouth 2 (two) times daily with meals.    CELECOXIB (CELEBREX) 200 MG CAPSULE    Take 1 capsule (200 mg total) by mouth once daily.    DOXYCYCLINE (VIBRA-TABS) 100 MG TABLET    Take 1 tablet (100 mg total) by mouth every 12 (twelve) hours.    ESCITALOPRAM OXALATE (LEXAPRO) 5 MG TAB    Take 5 mg by mouth once daily.    FERROUS SULFATE (FEOSOL) 325 MG (65 MG IRON) TAB TABLET    Take 1 tablet (325 mg total) by mouth twice daily three times a week. On dialysis days    FINASTERIDE  (PROSCAR) 5 MG TABLET    TAKE 1 TABLET ONCE DAILY.    FLUTICASONE (FLONASE) 50 MCG/ACTUATION NASAL SPRAY    2 sprays (100 mcg total) by Each Nare route once daily.    FUROSEMIDE (LASIX) 20 MG TABLET    Take 20 mg by mouth 2 (two) times daily.    GABAPENTIN (NEURONTIN) 300 MG CAPSULE    Take 1 capsule (300 mg total) by mouth every evening.    INSULIN ASPART U-100 (NOVOLOG) 100 UNIT/ML INJECTION    Inject into the skin 3 (three) times daily before meals. Sliding scale    LOSARTAN (COZAAR) 100 MG TABLET    Take 1 tablet (100 mg total) by mouth every evening.    MELATONIN 10 MG TAB    Take by mouth nightly.    MONTELUKAST (SINGULAIR) 10 MG TABLET    Take 10 mg by mouth every evening.    NITROSTAT 0.4 MG SL TABLET    PLACE 1 TABLET (0.4 MG TOTAL) UNDER THE TONGUE EVERY 5 (FIVE) MINUTES AS NEEDED FOR CHEST PAIN.    PANTOPRAZOLE (PROTONIX) 40 MG GRPS    Take 1 packet (40 mg total) by mouth once daily.    POLYETHYLENE GLYCOL (GLYCOLAX) 17 GRAM PWPK    Take 17 g by mouth.    QUETIAPINE (SEROQUEL) 25 MG TAB    Take 1 tablet (25 mg total) by mouth nightly as needed (unredirectable agitation).    RAMELTEON (ROZEREM) 8 MG TABLET    Take 1 tablet (8 mg total) by mouth nightly as needed for Insomnia.    TAMSULOSIN (FLOMAX) 0.4 MG CAP    TAKE 1 CAPSULE BY MOUTH EVERY DAY    TIOTROPIUM BROMIDE (SPIRIVA RESPIMAT) 1.25 MCG/ACTUATION MIST    Inhale 2.5 mcg into the lungs once daily. Controller    TORSEMIDE (DEMADEX) 20 MG TAB    Take 1 tablet (20 mg total) by mouth 2 (two) times daily.    WARFARIN (COUMADIN) 5 MG TABLET    Take 1 tablet (5 mg total) by mouth every Wednesday.    WARFARIN (COUMADIN) 5 MG TABLET    Take 1 tablet (5 mg total) by mouth every Sunday.    WARFARIN (COUMADIN) 7.5 MG TABLET    Take 1 tablet (7.5 mg total) by mouth every Monday, Tuesday, Thursday, Saturday.   Modified Medications    No medications on file   Discontinued Medications    No medications on file       Review of Systems   Constitution: Negative for  malaise/fatigue and weight gain.   HENT: Negative for hearing loss.    Eyes: Positive for double vision. Negative for visual disturbance.   Cardiovascular: Negative for chest pain, claudication, dyspnea on exertion, leg swelling, near-syncope, orthopnea, palpitations, paroxysmal nocturnal dyspnea and syncope.   Respiratory: Negative for cough, shortness of breath, sleep disturbances due to breathing, snoring and wheezing.    Endocrine: Negative for cold intolerance, heat intolerance, polydipsia, polyphagia and polyuria.   Hematologic/Lymphatic: Negative for bleeding problem. Does not bruise/bleed easily.   Skin: Negative for rash and suspicious lesions.   Musculoskeletal: Positive for falls and muscle weakness. Negative for arthritis, joint pain and myalgias.   Gastrointestinal: Negative for abdominal pain, change in bowel habit, constipation, diarrhea, heartburn, hematochezia, melena and nausea.   Genitourinary: Negative for hematuria and nocturia.   Neurological: Negative for excessive daytime sleepiness, dizziness, headaches, light-headedness, loss of balance and weakness.        Aphasia   Psychiatric/Behavioral: Negative for depression. The patient is not nervous/anxious.    Allergic/Immunologic: Negative for environmental allergies.       BP (!) 184/85 (BP Location: Left leg, Patient Position: Sitting, BP Method: Small (Automatic))   Pulse 73   Ht 6' (1.829 m)   BMI 28.21 kg/m²     Objective:   Physical Exam   Constitutional: He is oriented to person, place, and time. He appears well-developed and well-nourished.   Examined in the wheelchair due to inability to get up onto the exam table.     HENT:   Head: Normocephalic and atraumatic.   Mouth/Throat: Oropharynx is clear and moist.   Eyes: Pupils are equal, round, and reactive to light. Conjunctivae and EOM are normal. No scleral icterus.   Neck: Normal range of motion. Neck supple. No hepatojugular reflux and no JVD present. No tracheal deviation present.  No thyromegaly present.   Cardiovascular: Normal rate, normal heart sounds and intact distal pulses. An irregularly irregular rhythm present. PMI is not displaced.   Pulses:       Carotid pulses are 2+ on the right side, and 2+ on the left side.       Radial pulses are 2+ on the right side, and 2+ on the left side.        Dorsalis pedis pulses are 2+ on the right side, and 2+ on the left side.        Posterior tibial pulses are 2+ on the right side, and 2+ on the left side.   Left arm with AV fistula. Palpable thrill   Pulmonary/Chest: Effort normal and breath sounds normal.   Abdominal: Soft. Bowel sounds are normal. He exhibits no distension and no mass. There is no hepatosplenomegaly. There is no tenderness.   Musculoskeletal: He exhibits no edema or tenderness.   Lymphadenopathy:     He has no cervical adenopathy.   Neurological: He is alert and oriented to person, place, and time.   Expressive aphasia    Right hemiparesis   Skin: Skin is warm and dry. No rash noted. No cyanosis or erythema. Nails show no clubbing.   Psychiatric: He has a normal mood and affect. His speech is normal and behavior is normal.       Lab Results   Component Value Date     05/08/2019    K 3.0 (L) 05/08/2019     05/08/2019    CO2 30 (H) 05/08/2019    BUN 17 05/08/2019    CREATININE 3.6 (H) 05/08/2019    CREATININE 1.5 (H) 08/08/2013     (H) 05/08/2019    HGBA1C 8.3 (H) 03/21/2019    MG 2.1 04/01/2019    AST 15 05/08/2019    ALT 9 (L) 05/08/2019    ALBUMIN 3.4 (L) 05/08/2019    ALBUMIN 4.3 09/12/2013    PROT 6.4 05/08/2019    BILITOT 0.7 05/08/2019    WBC 6.93 05/08/2019    HGB 9.4 (L) 05/08/2019    HGB 13.8 (L) 05/09/2012    HCT 27.8 (L) 05/08/2019    MCV 95 05/08/2019     (L) 05/08/2019    INR 1.4 (H) 05/08/2019    INR 0.97 05/09/2012    TSH 0.031 (L) 03/20/2019    CHOL 89 (L) 07/26/2018    HDL 29 (L) 07/26/2018    LDLCALC 46.4 (L) 07/26/2018    TRIG 68 07/26/2018    BNP 1,146 (H) 03/25/2019       Assessment:      1. Cerebrovascular accident (CVA) due to occlusion of left posterior cerebral artery    2. Persistent atrial fibrillation : New onset. [id number] is 8 with recent stroke. Continue coumadin. Rate is controlled on carvedilol. I think the new onset afib is the etiology of his symptoms prior to admission. Will consider referral to EP for cardioversion at his next visit once her finishes rehabbing from his stroke.   3. Coronary artery disease involving native coronary artery of native heart without angina pectoris : He has known significant disease with preserved LVEF. No evidence of recent MI during his hospitalization. I see no need for coronary angiogram at this time. Continue with medical therapy with asa and atorvastatin.   4. Stenosis of right carotid artery : Continue asa and atorvastatin,.   5. Hypertension due to end stage renal disease caused by type 2 diabetes mellitus, on dialysis : Change losartan to valsartan 160 mg bid. Goal BP < 130/80.   6. Patent foramen ovale : Likely not related to his recent stroke given afib.   7. ESRD (end stage renal disease) on dialysis    8. Type 2 diabetes mellitus with complication, without long-term current use of insulin        Plan:     Micheal was seen today for cerebrovascular accident, severe persistent asthma with acute exacerbation and dizziness.    Diagnoses and all orders for this visit:    Cerebrovascular accident (CVA) due to occlusion of left posterior cerebral artery    Persistent atrial fibrillation    Coronary artery disease involving native coronary artery of native heart without angina pectoris    Stenosis of right carotid artery    Hypertension due to end stage renal disease caused by type 2 diabetes mellitus, on dialysis    Patent foramen ovale    ESRD (end stage renal disease) on dialysis    Type 2 diabetes mellitus with complication, without long-term current use of insulin        Thank you for allowing me to participate in this patient's care. Please  do not hesitate to contact me with any questions or concerns.    60 minutes face to face time with Counseling More Than 50% of the Appointment Time.;

## 2019-05-21 NOTE — TELEPHONE ENCOUNTER
----- Message from Ester Lyons sent at 2019 10:28 AM CDT -----  Contact: my chart  Micheal Hunt, 78 yrs, None    MRN:   [id number]  ::   1940  Lang:   English  Last BMI:   28.21 kg/m²  Pt Teja Status:   Teja  Prim Cvg::   MEDICARE/MEDICARE PART A & B  Cvg Last Verified Date:   2019  Patient Types:   [place]  PCP:   Primary Doctor No  Care Team:     Allergies:   Ace Inhibitors (Reaction: Other (See Comments))  ,   Arb-angiotensin Receptor Antagonist (Reaction: Itching)  ,   Eplerenone (Reaction: Other (See Comments))  ,   Sulfa (Sulfonamide Antibiotics) (Reaction: Itching, Swelling)  Patient Portal:   Active, 2019 4:43 PM  FYI  General   More Detail >>  Appointment Request  Micheal Dave Hunt  Sent: Thu May 16, 2019  1:33 PM  [facility]: [facility]     Micheal Hunt  MRN: [id number] : 1940  Pt Work: [phone] Pt Home: [phone]  Entered: [phone]      Message     ----- Message from [facility], System Message sent at 2019  1:33 PM CDT -----    Appointment Request From: Micheal Hunt    With Provider: Daniel    Preferred Date Range: 2019 - 2019    Preferred Times: Tuesday Morning, Thursday Morning    Reason for visit: New Patient    Comments:  [facility] have all info. and prescription medications regulated. Need to [facility] care [facility].

## 2019-05-21 NOTE — TELEPHONE ENCOUNTER
does not have any availability this month, they can be scheduled next month if they wish or with another provider.

## 2019-05-21 NOTE — Clinical Note
Greg Ewing,Can Mr Hunt get his blood pressure measurements taken from his right arm? He has an AV fistula in his left. Thanks,Jayda

## 2019-05-22 RX ORDER — VALSARTAN 80 MG/1
80 TABLET ORAL DAILY
Qty: 90 TABLET | Refills: 3 | Status: CANCELLED | OUTPATIENT
Start: 2019-05-22 | End: 2020-01-01

## 2019-05-24 ENCOUNTER — PATIENT MESSAGE (OUTPATIENT)
Dept: ENDOSCOPY | Facility: HOSPITAL | Age: 79
End: 2019-05-24

## 2019-05-31 ENCOUNTER — PATIENT MESSAGE (OUTPATIENT)
Dept: NEUROLOGY | Facility: CLINIC | Age: 79
End: 2019-05-31

## 2019-06-04 ENCOUNTER — PATIENT MESSAGE (OUTPATIENT)
Dept: ENDOSCOPY | Facility: HOSPITAL | Age: 79
End: 2019-06-04

## 2019-06-05 ENCOUNTER — TELEPHONE (OUTPATIENT)
Dept: TRANSPLANT | Facility: CLINIC | Age: 79
End: 2019-06-05

## 2019-06-05 NOTE — TELEPHONE ENCOUNTER
fAXED DENIAL LETTER FROM 7/2018 TO DIALYSIS UNIT SINCE PATIENT WAS PRE DIALYSIS WHEN DENIED.       ----- Message from Vesna Squires sent at 6/5/2019  2:31 PM CDT -----  Contact: [facility]  Needs Advice    Reason for call: Called for patient transplant status.        Communication Preference: [place]  [phone]    Additional Information: n/a

## 2019-06-11 ENCOUNTER — TELEPHONE (OUTPATIENT)
Dept: CARDIOLOGY | Facility: CLINIC | Age: 79
End: 2019-06-11

## 2019-06-11 NOTE — TELEPHONE ENCOUNTER
----- Message from Melissa Biggs sent at 6/11/2019 11:11 AM CDT -----  Contact: Ezequiel Paredes [phone] ask for 4th floor  Ezequiel called stating the pt has an allergy to the Rx Diovan that was ordered for him. Please call her at the number listed and ask for 4th floor.  LOV 5/21/19 Dr. ALIDA Jackson  [id number] - [place], [place] - [address] [facility] [place] [phone] (Phone)  [phone] (Fax)    Thanks

## 2019-06-11 NOTE — TELEPHONE ENCOUNTER
Confirmed [facility]. Pt allergy to Diovan is noted.

## 2019-06-19 ENCOUNTER — PATIENT MESSAGE (OUTPATIENT)
Dept: FAMILY MEDICINE | Facility: CLINIC | Age: 79
End: 2019-06-19

## 2019-07-30 ENCOUNTER — HOSPITAL ENCOUNTER (OUTPATIENT)
Dept: RADIOLOGY | Facility: HOSPITAL | Age: 79
Discharge: HOME | End: 2019-07-30
Attending: ORTHOPAEDIC SURGERY
Payer: MEDICARE

## 2019-07-30 ENCOUNTER — OFFICE VISIT (OUTPATIENT)
Dept: ORTHOPEDICS | Facility: CLINIC | Age: 79
End: 2019-07-30
Payer: MEDICARE

## 2019-07-30 VITALS — HEIGHT: 72 IN | WEIGHT: 208 LBS | BODY MASS INDEX: 28.17 KG/M2

## 2019-07-30 DIAGNOSIS — M25.511 RIGHT SHOULDER PAIN, UNSPECIFIED CHRONICITY: Primary | ICD-10-CM

## 2019-07-30 DIAGNOSIS — G89.29 CHRONIC RIGHT SHOULDER PAIN: ICD-10-CM

## 2019-07-30 DIAGNOSIS — M25.511 RIGHT SHOULDER PAIN, UNSPECIFIED CHRONICITY: ICD-10-CM

## 2019-07-30 DIAGNOSIS — M25.511 CHRONIC RIGHT SHOULDER PAIN: ICD-10-CM

## 2019-07-30 PROCEDURE — 73030 X-RAY EXAM OF SHOULDER: CPT | Mod: 26,RT,, | Performed by: RADIOLOGY

## 2019-07-30 PROCEDURE — 20610 DRAIN/INJ JOINT/BURSA W/O US: CPT | Mod: PBBFAC,PN | Performed by: ORTHOPAEDIC SURGERY

## 2019-07-30 PROCEDURE — 99213 PR OFFICE/OUTPT VISIT, EST, LEVL III, 20-29 MIN: ICD-10-PCS | Mod: S$PBB,25,, | Performed by: ORTHOPAEDIC SURGERY

## 2019-07-30 PROCEDURE — 99213 OFFICE O/P EST LOW 20 MIN: CPT | Mod: PBBFAC,25,PN | Performed by: ORTHOPAEDIC SURGERY

## 2019-07-30 PROCEDURE — 99999 PR PBB SHADOW E&M-EST. PATIENT-LVL III: ICD-10-PCS | Mod: PBBFAC,,, | Performed by: ORTHOPAEDIC SURGERY

## 2019-07-30 PROCEDURE — 73030 X-RAY EXAM OF SHOULDER: CPT | Mod: TC,PN,RT

## 2019-07-30 PROCEDURE — 99213 OFFICE O/P EST LOW 20 MIN: CPT | Mod: S$PBB,25,, | Performed by: ORTHOPAEDIC SURGERY

## 2019-07-30 PROCEDURE — 73030 XR SHOULDER COMPLETE 2 OR MORE VIEWS RIGHT: ICD-10-PCS | Mod: 26,RT,, | Performed by: RADIOLOGY

## 2019-07-30 PROCEDURE — 99999 PR PBB SHADOW E&M-EST. PATIENT-LVL III: CPT | Mod: PBBFAC,,, | Performed by: ORTHOPAEDIC SURGERY

## 2019-07-30 PROCEDURE — 20610 PR DRAIN/INJECT LARGE JOINT/BURSA: ICD-10-PCS | Mod: S$PBB,RT,, | Performed by: ORTHOPAEDIC SURGERY

## 2019-07-30 PROCEDURE — 20610 DRAIN/INJ JOINT/BURSA W/O US: CPT | Mod: S$PBB,RT,, | Performed by: ORTHOPAEDIC SURGERY

## 2019-07-30 RX ORDER — TRIAMCINOLONE ACETONIDE 40 MG/ML
40 INJECTION, SUSPENSION INTRA-ARTICULAR; INTRAMUSCULAR
Status: COMPLETED | OUTPATIENT
Start: 2019-07-30 | End: 2019-07-30

## 2019-07-30 RX ADMIN — TRIAMCINOLONE ACETONIDE 40 MG: 40 INJECTION, SUSPENSION INTRA-ARTICULAR; INTRAMUSCULAR at 09:07

## 2019-07-30 NOTE — PROGRESS NOTES
Subjective:      Patient ID: Micheal Hunt is a 78 y.o. male.  Chief Complaint: Pain and Follow-up of the Right Hand and Pain and Follow-up of the Right Wrist      HPI  Micheal Hunt is a  78 y.o. male presenting today for follow up of right hand and arm symptoms.  He reports that he is I improving a little bit with the right hand but he does have hemiparesis on the right arm related to a stroke  His main complaint today is right shoulder pain  He has had shoulder problems for many years and has also had previous rotator cuff surgery on the shoulder.    Review of patient's allergies indicates:   Allergen Reactions    Ace inhibitors Other (See Comments)     Cough    Arb-angiotensin receptor antagonist Itching    Eplerenone Other (See Comments)     Marked bradycardia, 40, tiredness and weakness      Sulfa (sulfonamide antibiotics) Itching and Swelling     Patient says this was 10 years ago and doesn't remember what happened         Current Outpatient Medications   Medication Sig Dispense Refill    albuterol (PROVENTIL/VENTOLIN HFA) 90 mcg/actuation inhaler 2 puffs every 4 hours as needed for cough, wheeze, or shortness of breath 3 Inhaler 3    albuterol-ipratropium (DUO-NEB) 2.5 mg-0.5 mg/3 mL nebulizer solution Take 3 mLs by nebulization every 6 (six) hours as needed for Wheezing or Shortness of Breath. 270 mL 0    allopurinol (ZYLOPRIM) 100 MG tablet Take 100 mg by mouth once daily.      amLODIPine (NORVASC) 10 MG tablet Take 10 mg by mouth once daily.      aspirin (ECOTRIN) 81 MG EC tablet Take 81 mg by mouth once daily.        atorvastatin (LIPITOR) 80 MG tablet TAKE 1 TABLET (80 MG TOTAL) BY MOUTH ONCE DAILY. (Patient taking differently: Take 50 mg by mouth once daily. ) 90 tablet 3    budesonide-formoterol 160-4.5 mcg (SYMBICORT) 160-4.5 mcg/actuation HFAA Inhale 2 puffs into the lungs every 12 (twelve) hours. Controller 3 Inhaler 4    carvedilol (COREG) 25 MG tablet Take 1 tablet (25  mg total) by mouth 2 (two) times daily with meals. 60 tablet 11    celecoxib (CELEBREX) 200 MG capsule Take 1 capsule (200 mg total) by mouth once daily. 30 capsule 3    doxycycline (VIBRA-TABS) 100 MG tablet Take 1 tablet (100 mg total) by mouth every 12 (twelve) hours. 2 tablet 0    ferrous sulfate (FEOSOL) 325 mg (65 mg iron) Tab tablet Take 1 tablet (325 mg total) by mouth twice daily three times a week. On dialysis days  0    finasteride (PROSCAR) 5 mg tablet TAKE 1 TABLET ONCE DAILY. 90 tablet 3    fluticasone (FLONASE) 50 mcg/actuation nasal spray 2 sprays (100 mcg total) by Each Nare route once daily. 3 Bottle 3    furosemide (LASIX) 20 MG tablet Take 20 mg by mouth 2 (two) times daily.      gabapentin (NEURONTIN) 300 MG capsule Take 1 capsule (300 mg total) by mouth every evening. 90 capsule 3    insulin aspart U-100 (NOVOLOG) 100 unit/mL injection Inject into the skin 3 (three) times daily before meals. Sliding scale      melatonin 10 mg Tab Take by mouth nightly.      montelukast (SINGULAIR) 10 mg tablet Take 10 mg by mouth every evening.      pantoprazole (PROTONIX) 40 mg GrPS Take 1 packet (40 mg total) by mouth once daily. 30 packet 11    polyethylene glycol (GLYCOLAX) 17 gram PwPk Take 17 g by mouth.      QUEtiapine (SEROQUEL) 25 MG Tab Take 1 tablet (25 mg total) by mouth nightly as needed (unredirectable agitation). (Patient taking differently: Take 12.5 mg by mouth once daily. ) 30 tablet 0    ramelteon (ROZEREM) 8 mg tablet Take 1 tablet (8 mg total) by mouth nightly as needed for Insomnia. 30 tablet 0    tamsulosin (FLOMAX) 0.4 mg Cap TAKE 1 CAPSULE BY MOUTH EVERY DAY 30 capsule 2    tiotropium bromide (SPIRIVA RESPIMAT) 1.25 mcg/actuation Mist Inhale 2.5 mcg into the lungs once daily. Controller 4 g 5    torsemide (DEMADEX) 20 MG Tab Take 1 tablet (20 mg total) by mouth 2 (two) times daily. 60 tablet 1    warfarin (COUMADIN) 5 MG tablet Take 1 tablet (5 mg total) by mouth every  Wednesday. 4 tablet 11    warfarin (COUMADIN) 5 MG tablet Take 1 tablet (5 mg total) by mouth every Sunday. 4 tablet 11    warfarin (COUMADIN) 7.5 MG tablet Take 1 tablet (7.5 mg total) by mouth every Monday, Tuesday, Thursday, Saturday. 16 tablet 11    NITROSTAT 0.4 mg SL tablet PLACE 1 TABLET (0.4 MG TOTAL) UNDER THE TONGUE EVERY 5 (FIVE) MINUTES AS NEEDED FOR CHEST PAIN. 25 tablet 3     No current facility-administered medications for this visit.        Past Medical History:   Diagnosis Date    NICK (acute kidney injury) 7/29/2016    Allergy     Anemia, mild 12/15/2014    Arthritis     Gout    Atrial fibrillation 03/2019    Benign essential HTN 3/27/2012    BMI 29.0-29.9,adult 5/10/2018    BPH (benign prostatic hyperplasia)     BPH (benign prostatic hyperplasia)     CAD (coronary artery disease) 2006    Carotid artery occlusion     60-70% FROYLAN, LICA < 50%    Chronic kidney disease     due to ibuprofen    Colon polyp     CRF (chronic renal failure), stage 5     Diabetes mellitus     Diverticulosis     ESRD (end stage renal disease) on dialysis     Gastritis     GERD (gastroesophageal reflux disease)     Gout     History of colon polyps 5/3/2018    HTN (hypertension) 3/27/2012    Hyperlipidemia     Hyperlipidemia     LLL pneumonia 6/14/2018    LVH (left ventricular hypertrophy)     Mesenteric ischemia     Murmur, cardiac 3/27/2012    GRICELDA (obstructive sleep apnea)     DOES NOT USE A MACHINE    Sinus problem     Stroke 03/2019    acute left PCA    Syncope and collapse        Past Surgical History:   Procedure Laterality Date    APPENDECTOMY      BLOCK-NERVE Left 5/31/2016    [facility]    CARDIAC CATHETERIZATION  2012    LIMA to LAD patent, SVG to RCA    COLONOSCOPY  2011    COLONOSCOPY N/A 5/3/2018    [facility]    COLONOSCOPY N/A 9/10/2015    [facility] END[facility]    CORONARY ARTERY  BYPASS GRAFT  4/2007    x 2 [place]    CYSTOSCOPY N/A 8/30/2017    [facility]    CYSTOSCOPY N/A 11/10/2015    [facility] OR    ESOPHAGOGASTRODUODENOSCOPY (EGD) N/A 1/4/2016    [facility]    ESOPHAGOGASTRODUODENOSCOPY (EGD) N/A 10/15/2014    [facility] at [facility] ENDO    INJECTION-STEROID-EPIDURAL-TRANSFORAMINAL Left 2/25/2016    [facility] OR    JOINT REPLACEMENT      left knee total replacement  X 3    mid leftt finger      from a cactuss    MOLE REMOVAL  2016    RADIOFREQUENCY THERMOCOAGULATION (RFTC)-NERVE-MEDIAN BRANCH-LUMBAR Left 4/11/2016    [facility] OR    rotative cuff      no rotative cuffs on bilat shoulders has pins     SHOULDER SURGERY      shoulder surgery bilat  RIGHT X 4; LEFT X 3    TRANSRECTAL ULTRASOUND GUIDED PROSTATE BIOPSY Bilateral 11/10/2015    [facility] at [facility] OR    ULTRASOUND-ENDOSCOPIC-UPPER N/A 5/31/2017    Performed [facility] at [facility] ENDO (2ND FLR)    ULTRASOUND-ENDOSCOPIC-UPPER N/A 1/4/2016    Performed [facility] Tra [facility] at [facility] ENDO (2ND FLR)    ULTRASOUND-ENDOSCOPIC-UPPER N/A 1/16/2015    Performed by Jason Saleem MD at [facility] ENDO (2ND FLR)       OBJECTIVE:   PHYSICAL EXAM:  Height: 6' (182.9 cm) Weight: 94.3 kg (208 lb)  Vitals:    07/30/19 0846   Weight: 94.3 kg (208 lb)   Height: 6' (1.829 m)   PainSc: 10-Worst pain ever     Ortho/SPM Exam  Examination right shoulder there is no tenderness no swelling there is atrophy noted  Passive motion is limited secondary to pain  Positive impingement sign and weak weakness in the right arm from the shoulder down  Examination right hand demonstrates a slight flexion contracture at the wrist the fingers have no active flexion or extension    RADIOGRAPHS:  AP lateral x-rays right shoulder demonstrate some  arthritic changes of the AC joint spurring and old suture anchors which are present including 1 would  Comments: I have personally reviewed the imaging and I agree with the above radiologist's report.    ASSESSMENT/PLAN:     IMPRESSION:  1.  Right shoulder and arm pain.  2.  Right hand paralysis related to stroke    PLAN:  I recommended injection for the shoulder.  After pause for time-out identified the right shoulder injected with Kenalog 40 mg 2 cc xylocaine sterile technique  He tolerated the procedure well without complication  He does have a wrist splint for the right wrist continue with that  Continue therapy for the right hand and wrist for passive stretching    FOLLOW UP:  4-6 weeks    Disclaimer: This note has been generated using voice-recognition software. There may be typographical errors that have been missed during proof-reading.

## 2019-08-13 ENCOUNTER — PATIENT OUTREACH (OUTPATIENT)
Dept: ADMINISTRATIVE | Facility: HOSPITAL | Age: 79
End: 2019-08-13

## 2019-08-21 PROBLEM — I48.91 ATRIAL FIBRILLATION: Status: RESOLVED | Noted: 2019-03-24 | Resolved: 2019-08-21

## 2019-08-23 NOTE — ASSESSMENT & PLAN NOTE
Per primary team. Risk factor for stroke  CHADsVASc 7  Coreg for rate control, coumadin 7.5 mg daily per primary team  At goal INR 2-3       [DrGurmeet  ___] : Dr. LIRIANO

## 2019-08-29 ENCOUNTER — PATIENT MESSAGE (OUTPATIENT)
Dept: FAMILY MEDICINE | Facility: CLINIC | Age: 79
End: 2019-08-29

## 2019-08-29 ENCOUNTER — TELEPHONE (OUTPATIENT)
Dept: FAMILY MEDICINE | Facility: CLINIC | Age: 79
End: 2019-08-29

## 2019-08-29 NOTE — TELEPHONE ENCOUNTER
Patient was discharged from nursing home and was scheduled for a nursing home follow up with many new concerns.

## 2019-09-13 ENCOUNTER — TELEPHONE (OUTPATIENT)
Dept: FAMILY MEDICINE | Facility: CLINIC | Age: 79
End: 2019-09-13

## 2019-09-13 NOTE — TELEPHONE ENCOUNTER
Call placed to patient's daughter (Elaine) to assist with rescheduling TCC appointment. No answer received. Left message requesting return call to office.

## 2019-09-13 NOTE — TELEPHONE ENCOUNTER
----- Message from Latoya Valencia LPN sent at 9/12/2019  4:18 PM CDT -----  Contact: daughterElaine      ----- Message -----  From: Lidia Dinh  Sent: 9/12/2019   3:40 PM  To: Lesvia Whittington Staff    Type: Needs Medical Advice    Who Called:  DaughterElaine  Symptoms (please be specific):    How long has patient had these symptoms:    Pharmacy name and phone #:    Best Call Back Number: [phone]  Additional Information: Daughter states patients dialysis times have changed and she needs to reschedule the appointment on 09/19/19 for the patient due to it is conflicting with his new dialysis days. Daughter is asking if she can get the appointment on a Monday, Wednesday or Friday. He has dialysis on Tues and Thurs. Please call daughter. Thanks!

## 2019-09-17 ENCOUNTER — PATIENT MESSAGE (OUTPATIENT)
Dept: FAMILY MEDICINE | Facility: CLINIC | Age: 79
End: 2019-09-17

## 2019-09-29 NOTE — ASSESSMENT & PLAN NOTE
Poorly controlled, A1C 8.3  Current glucose running ~100-150  Detemir 8 units daily  MD SSI  POCt glucose q6 hr   None

## 2019-09-30 ENCOUNTER — TELEPHONE (OUTPATIENT)
Dept: FAMILY MEDICINE | Facility: CLINIC | Age: 79
End: 2019-09-30

## 2019-09-30 NOTE — TELEPHONE ENCOUNTER
Return call placed to Meenu with [facility]. No answer received. Left message requesting return call to office.

## 2019-09-30 NOTE — TELEPHONE ENCOUNTER
----- Message from Jennifer Tapia sent at 9/30/2019  3:26 PM CDT -----   return call for Latoya   Who Called:  Meenu Duarte [facility]  Best Call Back Number: [phone]  Additional Information: requesting a call back from nurse, thanks!

## 2019-09-30 NOTE — TELEPHONE ENCOUNTER
----- Message from Unique Whitt sent at 9/30/2019  2:56 PM CDT -----  Contact: Meenu  Type: Needs Medical Advice    Who Called:  Meenu with [facility] health  Best Call Back Number: [phone]  Additional Information: requesting a call back from nurse, thanks!

## 2019-09-30 NOTE — TELEPHONE ENCOUNTER
Call placed to Meenu with [facility]. No answer received. Left message requesting return call to office.

## 2019-10-11 ENCOUNTER — OFFICE VISIT (OUTPATIENT)
Dept: FAMILY MEDICINE | Facility: CLINIC | Age: 79
End: 2019-10-11
Payer: MEDICARE

## 2019-10-11 VITALS
TEMPERATURE: 99 F | DIASTOLIC BLOOD PRESSURE: 60 MMHG | HEIGHT: 73 IN | OXYGEN SATURATION: 97 % | HEART RATE: 65 BPM | BODY MASS INDEX: 29.03 KG/M2 | SYSTOLIC BLOOD PRESSURE: 122 MMHG | WEIGHT: 219 LBS

## 2019-10-11 DIAGNOSIS — I67.89 ISCHEMIC ENCEPHALOPATHY: ICD-10-CM

## 2019-10-11 DIAGNOSIS — I69.391 DYSPHAGIA DUE TO OLD STROKE: ICD-10-CM

## 2019-10-11 DIAGNOSIS — J45.901 MILD ASTHMA WITH ACUTE EXACERBATION, UNSPECIFIED WHETHER PERSISTENT: ICD-10-CM

## 2019-10-11 DIAGNOSIS — J44.89 COPD WITH ASTHMA: ICD-10-CM

## 2019-10-11 DIAGNOSIS — N13.8 BPH WITH OBSTRUCTION/LOWER URINARY TRACT SYMPTOMS: ICD-10-CM

## 2019-10-11 DIAGNOSIS — K21.9 GASTROESOPHAGEAL REFLUX DISEASE, ESOPHAGITIS PRESENCE NOT SPECIFIED: ICD-10-CM

## 2019-10-11 DIAGNOSIS — E78.5 HYPERLIPIDEMIA, UNSPECIFIED HYPERLIPIDEMIA TYPE: ICD-10-CM

## 2019-10-11 DIAGNOSIS — N40.0 BENIGN NON-NODULAR PROSTATIC HYPERPLASIA WITHOUT LOWER URINARY TRACT SYMPTOMS: ICD-10-CM

## 2019-10-11 DIAGNOSIS — D50.8 IRON DEFICIENCY ANEMIA SECONDARY TO INADEQUATE DIETARY IRON INTAKE: Primary | ICD-10-CM

## 2019-10-11 DIAGNOSIS — J45.30 MILD PERSISTENT ASTHMA WITHOUT COMPLICATION: ICD-10-CM

## 2019-10-11 DIAGNOSIS — N40.1 BPH WITH OBSTRUCTION/LOWER URINARY TRACT SYMPTOMS: ICD-10-CM

## 2019-10-11 DIAGNOSIS — G47.31 CENTRAL SLEEP APNEA: ICD-10-CM

## 2019-10-11 DIAGNOSIS — I21.A9 OTHER MYOCARDIAL INFARCTION TYPE: ICD-10-CM

## 2019-10-11 DIAGNOSIS — M43.02 CERVICAL SPONDYLOLYSIS: ICD-10-CM

## 2019-10-11 PROCEDURE — 99999 PR PBB SHADOW E&M-EST. PATIENT-LVL V: ICD-10-PCS | Mod: PBBFAC,,, | Performed by: FAMILY MEDICINE

## 2019-10-11 PROCEDURE — 99215 OFFICE O/P EST HI 40 MIN: CPT | Mod: PBBFAC,PO | Performed by: FAMILY MEDICINE

## 2019-10-11 PROCEDURE — 99215 PR OFFICE/OUTPT VISIT, EST, LEVL V, 40-54 MIN: ICD-10-PCS | Mod: S$PBB,,, | Performed by: FAMILY MEDICINE

## 2019-10-11 PROCEDURE — 99999 PR PBB SHADOW E&M-EST. PATIENT-LVL V: CPT | Mod: PBBFAC,,, | Performed by: FAMILY MEDICINE

## 2019-10-11 PROCEDURE — 99215 OFFICE O/P EST HI 40 MIN: CPT | Mod: S$PBB,,, | Performed by: FAMILY MEDICINE

## 2019-10-11 RX ORDER — SERTRALINE HYDROCHLORIDE 25 MG/1
25 TABLET, FILM COATED ORAL DAILY
Qty: 30 TABLET | Refills: 11 | Status: SHIPPED | OUTPATIENT
Start: 2019-10-11 | End: 2020-01-01 | Stop reason: ALTCHOICE

## 2019-10-11 RX ORDER — ATORVASTATIN CALCIUM 80 MG/1
40 TABLET, FILM COATED ORAL DAILY
Qty: 45 TABLET | Refills: 3 | Status: SHIPPED | OUTPATIENT
Start: 2019-10-11 | End: 2020-01-01

## 2019-10-11 RX ORDER — TAMSULOSIN HYDROCHLORIDE 0.4 MG/1
1 CAPSULE ORAL DAILY
Qty: 90 CAPSULE | Refills: 2 | Status: ON HOLD | OUTPATIENT
Start: 2019-10-11 | End: 2020-01-01

## 2019-10-11 RX ORDER — FINASTERIDE 5 MG/1
5 TABLET, FILM COATED ORAL DAILY
Qty: 90 TABLET | Refills: 3 | Status: ON HOLD | OUTPATIENT
Start: 2019-10-11 | End: 2020-01-01

## 2019-10-11 RX ORDER — PANTOPRAZOLE SODIUM 40 MG/1
40 TABLET, DELAYED RELEASE ORAL DAILY
Qty: 30 TABLET | Refills: 11 | Status: ON HOLD | OUTPATIENT
Start: 2019-10-11 | End: 2020-01-01

## 2019-10-11 RX ORDER — CARVEDILOL 25 MG/1
25 TABLET ORAL 2 TIMES DAILY WITH MEALS
Qty: 60 TABLET | Refills: 11 | Status: SHIPPED | OUTPATIENT
Start: 2019-10-11 | End: 2020-01-01

## 2019-10-11 RX ORDER — ALBUTEROL SULFATE 90 UG/1
AEROSOL, METERED RESPIRATORY (INHALATION)
Qty: 3 INHALER | Refills: 3
Start: 2019-10-11 | End: 2020-01-01 | Stop reason: SDUPTHER

## 2019-10-11 NOTE — PATIENT INSTRUCTIONS

## 2019-10-17 ENCOUNTER — OUTPATIENT CASE MANAGEMENT (OUTPATIENT)
Dept: ADMINISTRATIVE | Facility: OTHER | Age: 79
End: 2019-10-17

## 2019-10-17 NOTE — PROGRESS NOTES
10/14/19- Per chart patient is living at [facility] on [address]. Called [phone] and patient was discharged home from the facility.  10/22/19-Spoke to daughter, Tonya who asked to be called back after 11 am. Called Tonya back and left a message. RN OPCM 2'nd follow up attempt. Will mail letter to patient/caregivr.

## 2019-10-17 NOTE — LETTER
October 23, 2019    Micheal Hunt  [address]  [place] [address]             [facility] [address] [address] Dear   Marilee:    I work with [facility]s outpatient case management department. We received a referral to call you to discuss your medical history. I attempted to reach you by telephone but I was unsuccessful. Please call our department that we may go over some questions with you regarding your health. My phone number is [phone].    The outpatient case management department can be reached at [phone] from 8:00 am to 4:30 PM on Monday thru Friday. [facility] also has a program where a nurse is available 24/7 to answer questions or provide medical advice their number is [phone].    If you have any questions or concerns, please dont hesitate to call.     Sincerely,       Kaylyn Jones RN CCM [facility]  Outpatient Complex Care Management  [phone]

## 2019-10-28 NOTE — PROGRESS NOTES
"Subjective:       Patient ID: Micheal Hunt is a 79 y.o. male.    Chief Complaint: Transitional Care    Transitional Care Note    Family and/or Caretaker present at visit?  Yes.  Diagnostic tests reviewed/disposition: I have reviewed all completed as well as pending diagnostic tests at the time of discharge.  Disease/illness education:   Home health/community services discussion/referrals: Patient has home health established at discharged.   Establishment or re-establishment of referral orders for community resources: No other necessary community resources.   Discussion with other health care providers: No discussion with other health care providers necessary.       He presents today to establish care following a recent hospl admission. He is accompanied by his wife and two "daughters." Another daughter is on the phone. He was previously followed by Dr. Bland in [place]. He has known CAD with previous CABG done in [place]. His last angiogram was in 2012 and showed a patent LIMA to LAD and SVG to RCA with occluded LM. He started on HD in November for ESRD. He was admitted to [facility] from 3/11-3/21 for shortness of breath, asthma and PNA. He had not been previously diagnosed with PNA.  He was readmitted on 3/18 and transferred to [facility] on 3/21 for LHC with concern of subclavian steal causing chest pain and shortness of breath. His admission ECG showed atrial fibrillation which was new. His troponins were mildly elevated and flat. Once transferred to [facility], he developed an acute left PCA stroke and was transferred to Neuro-ICU. He was started on coumadin. His echo done 3/19 showed and LVEF of 55% with mild to moderate MR and  PFO with left to right shunting. PASP was 41 with an RAP of 3. A carotid US showed a 60-70% FROYLAN stenosis and a LICA stenosis < 50%. Retrograde flow was noted in the left vertebral artery He is currently in rehab. He has ongonig hemiparesis of his right side, double vision and " aphasia. His blood pressure readings have been elevated. Micheal Hunt denies any chest pain, shortness of breath, PND, orthopnea, palpitations, leg edema, or syncope. He has had two falls. He denies any dizziness or pain in his left arm with use.    Review of Systems   Constitutional: Negative for fatigue and unexpected weight change.   Respiratory: Negative for chest tightness and shortness of breath.    Cardiovascular: Negative for chest pain, palpitations and leg swelling.   Gastrointestinal: Negative for abdominal pain.   Musculoskeletal: Negative for arthralgias.   Neurological: Negative for dizziness, syncope, light-headedness and headaches.       Patient Active Problem List   Diagnosis    Osteoarthritis of right knee    Nocturia    Carotid artery stenosis    Essential hypertension    Hyperlipidemia    Vertigo    CAD (coronary artery disease), 2V CABG 2007    Gout, arthritis    Obesity, Class I, BMI 32.8 to 34.3, 32.7, today 30.4    Dizziness    LVH (left ventricular hypertrophy) due to hypertensive disease    Abnormal cardiovascular stress test    GERD (gastroesophageal reflux disease)    Elevated PSA    Acquired hammer toe    Diastolic dysfunction    Abdominal obesity    Back pain, chronic    Glucose intolerance (impaired glucose tolerance)    Obstructive sleep apnea    Anemia of chronic disease    Gastric nodule    Diverticulitis, 2005, 2015    Personal history of colonic polyps    PSA elevation    DDD (degenerative disc disease), lumbar    LV dysfunction, EF 50%, reduced to 34%, now 46%    Other spondylosis, lumbar region    Knee pain    Benign prostatic hyperplasia without lower urinary tract symptoms    Itching, both legs, onset 7/2017    Chronic sinusitis    Sigmoid diverticulitis, 3 episodes, 2005, 2015, 2018    Anemia due to chronic kidney disease, on chronic dialysis    Renal cyst    Unilateral inguinal hernia    Chronic kidney disease, stage 5     Generalized abdominal pain    Aortic stenosis    Secondary hyperparathyroidism    Erythropoietin (EPO) stimulating agent anemia management patient    Enteritis    CRF (chronic renal failure), stage 5    Hyponatremia    Hypocalcemia    Mild malnutrition    Mild asthma with acute exacerbation    Iron deficiency anemia    Dialysis patient, onset 11/2018    Pain of left hand    Immune deficiency disorder    Aortic calcification    Persistent atrial fibrillation    ESRD (end stage renal disease)    NSVT (nonsustained ventricular tachycardia)    Long term (current) use of anticoagulants    Acute on chronic diastolic congestive heart failure    Type 2 diabetes mellitus, without long-term current use of insulin    Hypertension due to end stage renal disease caused by type 2 diabetes mellitus, on dialysis    Pulmonary hypertension    NSTEMI (non-ST elevated myocardial infarction)    ESRD (end stage renal disease) on dialysis    Cerebrovascular accident (CVA) due to occlusion of left posterior cerebral artery    Patent foramen ovale    Cellulitis of right arm    Dysarthria due to acute cerebrovascular accident (CVA)    Encephalopathy    Groin hematoma    Cytotoxic cerebral edema    Umbilical hernia without obstruction and without gangrene    Umbilical hernia    Sprain of right wrist    Right homonymous hemianopsia due to recent cerebral infarction    RABIA (internuclear ophthalmoplegia), left    History of stroke    Chronic right shoulder pain       Objective:      Physical Exam   Constitutional: He is oriented to person, place, and time. He appears well-developed. No distress.   HENT:   Head: Normocephalic and atraumatic.   Right Ear: External ear normal.   Left Ear: External ear normal.   Nose: Nose normal.   Mouth/Throat: Oropharynx is clear and moist. No oropharyngeal exudate.   Eyes: Pupils are equal, round, and reactive to light. Conjunctivae and EOM are normal. Right eye exhibits no  discharge. Left eye exhibits no discharge. No scleral icterus.   Neck: Normal range of motion. Neck supple. No JVD present. No tracheal deviation present. No thyromegaly present.   Cardiovascular: Normal rate, normal heart sounds and intact distal pulses.   No murmur heard.  Pulmonary/Chest: Effort normal and breath sounds normal. No respiratory distress. He has no wheezes. He has no rales.   Abdominal: Soft. Bowel sounds are normal. He exhibits no distension and no mass. There is no tenderness. There is no rebound and no guarding.   Musculoskeletal: He exhibits no edema or tenderness.          Lymphadenopathy:     He has no cervical adenopathy.   Neurological: He is alert and oriented to person, place, and time. No cranial nerve deficit. Coordination normal.   Skin: Skin is warm and dry. No rash noted. He is not diaphoretic. No erythema. No pallor.   Psychiatric: He has a normal mood and affect. His behavior is normal. Judgment and thought content normal.   Vitals reviewed.      Lab Results   Component Value Date    WBC 6.93 05/08/2019    HGB 9.4 (L) 05/08/2019    HCT 27.8 (L) 05/08/2019     (L) 05/08/2019    CHOL 89 (L) 07/26/2018    TRIG 68 07/26/2018    HDL 29 (L) 07/26/2018    ALT 9 (L) 05/08/2019    AST 15 05/08/2019     05/08/2019    K 3.0 (L) 05/08/2019     05/08/2019    CREATININE 3.6 (H) 05/08/2019    BUN 17 05/08/2019    CO2 30 (H) 05/08/2019    TSH 0.031 (L) 03/20/2019    PSA 5.6 (H) 07/26/2018    INR 1.4 (H) 05/08/2019    HGBA1C 8.3 (H) 03/21/2019     The ASCVD Risk score (Bhavin ANTONIO Jr., et al., 2013) failed to calculate for the following reasons:    The 2013 ASCVD risk score is only valid for ages 40 to 79    The patient has a prior MI or stroke diagnosis    Assessment:       1. Iron deficiency anemia secondary to inadequate dietary iron intake    2. Benign non-nodular prostatic hyperplasia without lower urinary tract symptoms    3. BPH with obstruction/lower urinary tract symptoms     4. Hyperlipidemia, unspecified hyperlipidemia type    5. Mild persistent asthma without complication    6. Mild asthma with acute exacerbation, unspecified whether persistent    7. Ischemic encephalopathy    8. COPD with asthma    9. Gastroesophageal reflux disease, esophagitis presence not specified    10. Other myocardial infarction type     11. Dysphagia due to old stroke    12. Cervical spondylolysis    13. Central sleep apnea        Plan:       Iron deficiency anemia secondary to inadequate dietary iron intake  -     Iron and TIBC; Future; Expected date: 10/11/2019  -     Ferritin; Future; Expected date: 10/11/2019  -     Hemoglobin A1c; Future; Expected date: 10/11/2019  -     Magnesium; Future; Expected date: 10/11/2019  -     Ambulatory referral to Outpatient Case Management  -     SUBSEQUENT HOME HEALTH ORDERS    Benign non-nodular prostatic hyperplasia without lower urinary tract symptoms  -     finasteride (PROSCAR) 5 mg tablet; Take 1 tablet (5 mg total) by mouth once daily.  Dispense: 90 tablet; Refill: 3  -     Ambulatory referral to Outpatient Case Management  -     SUBSEQUENT HOME HEALTH ORDERS    BPH with obstruction/lower urinary tract symptoms  -     tamsulosin (FLOMAX) 0.4 mg Cap; Take 1 capsule (0.4 mg total) by mouth once daily.  Dispense: 90 capsule; Refill: 2  -     Ambulatory referral to Outpatient Case Management  -     SUBSEQUENT HOME HEALTH ORDERS    Hyperlipidemia, unspecified hyperlipidemia type  -     atorvastatin (LIPITOR) 80 MG tablet; Take 0.5 tablets (40 mg total) by mouth once daily.  Dispense: 45 tablet; Refill: 3  -     Ambulatory referral to Outpatient Case Management  -     SUBSEQUENT HOME HEALTH ORDERS    Mild persistent asthma without complication  -     albuterol (PROVENTIL/VENTOLIN HFA) 90 mcg/actuation inhaler; 2 puffs every 4 hours as needed for cough, wheeze, or shortness of breath  Dispense: 3 Inhaler; Refill: 3  -     Ambulatory referral to Outpatient Case  Management  -     SUBSEQUENT HOME HEALTH ORDERS    Mild asthma with acute exacerbation, unspecified whether persistent  -     tiotropium bromide (SPIRIVA RESPIMAT) 1.25 mcg/actuation Mist; Inhale 2.5 mcg into the lungs once daily. Controller  Dispense: 4 g; Refill: 5  -     Ambulatory referral to Outpatient Case Management  -     SUBSEQUENT HOME HEALTH ORDERS    Ischemic encephalopathy  -     atorvastatin (LIPITOR) 80 MG tablet; Take 0.5 tablets (40 mg total) by mouth once daily.  Dispense: 45 tablet; Refill: 3  -     apixaban (ELIQUIS) 2.5 mg Tab; Take 1 tablet (2.5 mg total) by mouth 2 (two) times daily.  Dispense: 60 tablet; Refill: 4  -     carvedilol (COREG) 25 MG tablet; Take 1 tablet (25 mg total) by mouth 2 (two) times daily with meals.  Dispense: 60 tablet; Refill: 11  -     Ambulatory referral to Outpatient Case Management  -     sertraline (ZOLOFT) 25 MG tablet; Take 1 tablet (25 mg total) by mouth once daily.  Dispense: 30 tablet; Refill: 11  -     SUBSEQUENT HOME HEALTH ORDERS    COPD with asthma  -     albuterol (PROVENTIL/VENTOLIN HFA) 90 mcg/actuation inhaler; 2 puffs every 4 hours as needed for cough, wheeze, or shortness of breath  Dispense: 3 Inhaler; Refill: 3  -     tiotropium bromide (SPIRIVA RESPIMAT) 1.25 mcg/actuation Mist; Inhale 2.5 mcg into the lungs once daily. Controller  Dispense: 4 g; Refill: 5  -     Ambulatory referral to Outpatient Case Management  -     SUBSEQUENT HOME HEALTH ORDERS    Gastroesophageal reflux disease, esophagitis presence not specified  -     pantoprazole (PROTONIX) 40 MG tablet; Take 1 tablet (40 mg total) by mouth once daily.  Dispense: 30 tablet; Refill: 11  -     Ambulatory referral to Outpatient Case Management  -     SUBSEQUENT HOME HEALTH ORDERS    Other myocardial infarction type   -     Hemoglobin A1c; Future; Expected date: 10/11/2019  -     Ambulatory referral to Outpatient Case Management  -     SUBSEQUENT HOME HEALTH ORDERS    Dysphagia due to old  stroke  -     Ambulatory referral to Outpatient Case Management  -     Ambulatory referral to Gastroenterology  -     SUBSEQUENT HOME HEALTH ORDERS    Cervical spondylolysis  -     Ambulatory referral to Gastroenterology  -     SUBSEQUENT HOME HEALTH ORDERS    Central sleep apnea  -     Pulse oximetry overnight; Future  -     SUBSEQUENT HOME HEALTH ORDERS      45 mins discussion.  [facility] goal documentation:  Patient readiness: acceptance and barriers:readiness and social stressors  During the course of the visit the patient was educated and counseled about the following: Hypertension:   Screening for causes of secondary hypertension: GFR (chronic kidney disease).  Regular aerobic exercise.  Check blood pressures daily and record.  Goals: Hypertension: Reduce Blood Pressure  Goal/Outcomes met:Hypertension  The following self management tools provided:blood pressure log  Patient Instructions (the written plan) was given to the patient/family: Yes  Time spent with patient: 40 minutes    Patient with be reevaluated in 3 months or sooner prn    Greater than 50% of this visit was spent counseling as described in above documentation:Yes

## 2019-10-29 ENCOUNTER — PATIENT MESSAGE (OUTPATIENT)
Dept: GASTROENTEROLOGY | Facility: CLINIC | Age: 79
End: 2019-10-29

## 2019-10-30 ENCOUNTER — PATIENT MESSAGE (OUTPATIENT)
Dept: GASTROENTEROLOGY | Facility: CLINIC | Age: 79
End: 2019-10-30

## 2019-10-30 ENCOUNTER — OFFICE VISIT (OUTPATIENT)
Dept: GASTROENTEROLOGY | Facility: CLINIC | Age: 79
End: 2019-10-30
Payer: MEDICARE

## 2019-10-30 VITALS
RESPIRATION RATE: 16 BRPM | HEART RATE: 62 BPM | WEIGHT: 218.94 LBS | BODY MASS INDEX: 29.02 KG/M2 | DIASTOLIC BLOOD PRESSURE: 73 MMHG | HEIGHT: 73 IN | SYSTOLIC BLOOD PRESSURE: 120 MMHG

## 2019-10-30 DIAGNOSIS — Z86.010 PERSONAL HISTORY OF COLONIC POLYPS: ICD-10-CM

## 2019-10-30 DIAGNOSIS — Z86.73 HISTORY OF STROKE: ICD-10-CM

## 2019-10-30 DIAGNOSIS — N18.5 CHRONIC KIDNEY DISEASE, STAGE 5: ICD-10-CM

## 2019-10-30 DIAGNOSIS — K31.89 GASTRIC NODULE: ICD-10-CM

## 2019-10-30 DIAGNOSIS — I27.20 PULMONARY HYPERTENSION: ICD-10-CM

## 2019-10-30 DIAGNOSIS — I63.532 CEREBROVASCULAR ACCIDENT (CVA) DUE TO OCCLUSION OF LEFT POSTERIOR CEREBRAL ARTERY: Primary | ICD-10-CM

## 2019-10-30 DIAGNOSIS — R13.10 DYSPHAGIA, UNSPECIFIED TYPE: ICD-10-CM

## 2019-10-30 PROCEDURE — 99214 OFFICE O/P EST MOD 30 MIN: CPT | Mod: S$PBB,,, | Performed by: INTERNAL MEDICINE

## 2019-10-30 PROCEDURE — 99214 PR OFFICE/OUTPT VISIT, EST, LEVL IV, 30-39 MIN: ICD-10-PCS | Mod: S$PBB,,, | Performed by: INTERNAL MEDICINE

## 2019-10-30 PROCEDURE — 99213 OFFICE O/P EST LOW 20 MIN: CPT | Mod: PBBFAC,PO | Performed by: INTERNAL MEDICINE

## 2019-10-30 PROCEDURE — 99999 PR PBB SHADOW E&M-EST. PATIENT-LVL III: ICD-10-PCS | Mod: PBBFAC,,, | Performed by: INTERNAL MEDICINE

## 2019-10-30 PROCEDURE — 99999 PR PBB SHADOW E&M-EST. PATIENT-LVL III: CPT | Mod: PBBFAC,,, | Performed by: INTERNAL MEDICINE

## 2019-10-30 NOTE — LETTER
October 30, 2019      Pk Lakhani MD  [address]  [place] [address]           [facility]  [address]  [facility] [address]  Phone: [phone]          Patient: Micheal Hunt   MR Number: [id number]   YOB: 1940   Date of Visit: 10/30/2019       Dear Dr. Pk Lakhani:    Thank you for referring Micheal Hunt to me for evaluation. Attached you will find relevant portions of my assessment and plan of care.    If you have questions, please do not hesitate to call me. I look forward to following Micheal Hunt along with you.    Sincerely,    Messi Harris MD    Enclosure  CC:  No Recipients    If you would like to receive this communication electronically, please contact [email or web address] or [phone] to request more information on [facility] access.    For providers and/or their staff who would like to refer a patient to [facility], please contact us through our one-stop-shop provider referral line, [facility] , at [phone].    If you feel you have received this communication in error or would no longer like to receive these types of communications, please e-mail [email or web address]

## 2019-10-30 NOTE — PROGRESS NOTES
"Subjective:       Patient ID: Micheal Hunt is a 79 y.o. male.    This is an established patient.      Chief Complaint: Dysphagia    Patient seen for dysphagia, occurring mostly with solid foods, frequency often, alleviated/exacerbated by nothing in particular, associated signs/symptoms including choking, onset remote.  He has had a CVA and is wheelchair bound.  His family member is with him and relates most of the history, including that of cervical bone spurs for which he was supposed to see surgery.  He has not had recent EGD however, on past exam he had gastric nodules followed by EUS for which he is now due.  No other acute complaints.        Review of Systems   Constitutional: Negative for chills, fatigue and fever.   HENT: Positive for trouble swallowing.    Respiratory: Negative for cough, shortness of breath and wheezing.    Cardiovascular: Negative for chest pain and palpitations.   Gastrointestinal: Negative for abdominal pain, constipation, diarrhea, nausea and vomiting.   Musculoskeletal: Negative for arthralgias and myalgias.   Skin: Negative for color change and rash.   Neurological: Positive for weakness. Negative for dizziness and numbness.   Psychiatric/Behavioral: Negative for confusion. The patient is not nervous/anxious.    All other systems reviewed and are negative.      Objective:       Vitals:    10/30/19 1353   BP: 120/73   Pulse: 62   Resp: 16   Weight: 99.3 kg (218 lb 14.7 oz)   Height: 6' 1" (1.854 m)       Physical Exam   Constitutional: He is oriented to person, place, and time. He appears well-developed and well-nourished.   Wheelchair bound     HENT:   Head: Normocephalic and atraumatic.   Eyes: Pupils are equal, round, and reactive to light. No scleral icterus.   Neck: Normal range of motion. Neck supple. No thyromegaly present.   Cardiovascular: Normal rate and regular rhythm.   No murmur heard.  Pulmonary/Chest: Effort normal and breath sounds normal. He has no wheezes. "   Abdominal: Soft. Bowel sounds are normal. He exhibits no distension. There is no tenderness.   Lymphadenopathy:     He has no cervical adenopathy.   Neurological: He is alert and oriented to person, place, and time.   + RUE/RLE weakness     Skin: Skin is warm and dry. No rash noted. No erythema.   Psychiatric: He has a normal mood and affect. His behavior is normal.   Vitals reviewed.        Lab Results   Component Value Date    WBC 6.93 05/08/2019    HGB 9.4 (L) 05/08/2019    HCT 27.8 (L) 05/08/2019    MCV 95 05/08/2019     (L) 05/08/2019         Past EUS and EGD reports reviewed.      Assessment:       1. Cerebrovascular accident (CVA) due to occlusion of left posterior cerebral artery    2. History of stroke    3. Pulmonary hypertension    4. Chronic kidney disease, stage 5    5. Gastric nodule    6. Personal history of colonic polyps    7. Dysphagia, unspecified type        Plan:       1.  Antireflux precautions including avoidance of late night eating and lying down soon after eating.     2.  Check MBSS  3.  EGD for dysphagia.  Referral to [facility] for surveillance EUS.  4.  Depending on above, may consider barium esophagram  5.  Referral to neurology as dysphagia probably due in large part to his CVA  6.  Further recommendations to follow after above.

## 2019-11-01 ENCOUNTER — PATIENT MESSAGE (OUTPATIENT)
Dept: GASTROENTEROLOGY | Facility: CLINIC | Age: 79
End: 2019-11-01

## 2019-11-01 ENCOUNTER — OUTPATIENT CASE MANAGEMENT (OUTPATIENT)
Dept: ADMINISTRATIVE | Facility: OTHER | Age: 79
End: 2019-11-01

## 2019-11-01 NOTE — PROGRESS NOTES
11/01/19-Called patient back after receiving voice mail. No answer, left message.   11/04/19-Will close case since no phone call back from patient/caregiver. 4'th attempt for assessment on patient.    no anxiety/no suicidal ideation/no depression

## 2019-11-11 ENCOUNTER — TELEPHONE (OUTPATIENT)
Dept: FAMILY MEDICINE | Facility: CLINIC | Age: 79
End: 2019-11-11

## 2019-11-11 NOTE — TELEPHONE ENCOUNTER
----- Message from Gloria Arvizu sent at 11/11/2019  2:06 PM CST -----  Type: Needs Medical Advice    Who Called:  Patients Tonya     [facility] Call Back Number: [phone]   Additional Information: ATTN : Alisson : regarding RS the dialysis holiday schedule

## 2019-11-11 NOTE — TELEPHONE ENCOUNTER
----- Message from Garth Eli sent at 11/8/2019  4:30 PM CST -----  Type:  Sooner Apoointment Request    Caller is requesting a sooner appointment.  Caller declined first available appointment listed below.  Caller will not accept being placed on the waitlist and is requesting a message be sent to doctor.    Name of Caller:   Tonya Hunt (Daughter)     When is the first available appointment?  Patient needs to reschedule from 11/29 and soonest is 06/05/20  Symptoms:  6 week FU  [facility] Call Back Number:  [phone]  Additional Information:

## 2019-11-13 ENCOUNTER — TELEPHONE (OUTPATIENT)
Dept: GASTROENTEROLOGY | Facility: CLINIC | Age: 79
End: 2019-11-13

## 2019-11-13 NOTE — TELEPHONE ENCOUNTER
Returned call spoke with daughter reviewed EGD instructions and informed Dr. Harris also order swallow study and needs to be schedule patient number to scheduling.

## 2019-11-13 NOTE — TELEPHONE ENCOUNTER
----- Message from Iona Jacinto sent at 11/13/2019  2:44 PM CST -----  Contact: Patient's daughter Tonya  Type: Needs Medical Advice    Who Called:  Tonya  [facility] Call Back Number: [phone]  Additional Information: Tonya is stating she was on the phone with Yanet and the call got disconnected.Please call back and advise.

## 2019-11-14 ENCOUNTER — TELEPHONE (OUTPATIENT)
Dept: GASTROENTEROLOGY | Facility: CLINIC | Age: 79
End: 2019-11-14

## 2019-11-14 NOTE — TELEPHONE ENCOUNTER
----- Message from Sintia Ramachandran sent at 11/14/2019  1:08 PM CST -----  Type: Needs Medical Advice    Who Called:  Daughter Tonya  Symptoms (please be specific):  na  How long has patient had these symptoms:  na  Pharmacy name and phone #:  [facility]  [facility] Call Back Number: [phone]  Additional Information: calling to reschedule the endoscopy

## 2019-12-02 ENCOUNTER — HOSPITAL ENCOUNTER (OUTPATIENT)
Dept: RADIOLOGY | Facility: HOSPITAL | Age: 79
Discharge: HOME | End: 2019-12-02
Attending: INTERNAL MEDICINE
Payer: MEDICARE

## 2019-12-02 DIAGNOSIS — I63.532 CEREBROVASCULAR ACCIDENT (CVA) DUE TO OCCLUSION OF LEFT POSTERIOR CEREBRAL ARTERY: ICD-10-CM

## 2019-12-02 DIAGNOSIS — R13.13 PHARYNGEAL DYSPHAGIA: Primary | ICD-10-CM

## 2019-12-02 DIAGNOSIS — Z86.73 HISTORY OF STROKE: ICD-10-CM

## 2019-12-02 PROCEDURE — 74230 X-RAY XM SWLNG FUNCJ C+: CPT | Mod: TC

## 2019-12-02 PROCEDURE — 92610 EVALUATE SWALLOWING FUNCTION: CPT

## 2019-12-02 PROCEDURE — G8997 SWALLOW GOAL STATUS: HCPCS | Mod: CH

## 2019-12-02 PROCEDURE — 92526 ORAL FUNCTION THERAPY: CPT

## 2019-12-02 PROCEDURE — 74230 X-RAY XM SWLNG FUNCJ C+: CPT | Mod: 26,,, | Performed by: RADIOLOGY

## 2019-12-02 PROCEDURE — 92611 MOTION FLUOROSCOPY/SWALLOW: CPT

## 2019-12-02 PROCEDURE — G8996 SWALLOW CURRENT STATUS: HCPCS | Mod: CJ

## 2019-12-02 PROCEDURE — G8998 SWALLOW D/C STATUS: HCPCS | Mod: CJ

## 2019-12-02 PROCEDURE — 74230 FL MODIFIED BARIUM SWALLOW SPEECH STUDY: ICD-10-PCS | Mod: 26,,, | Performed by: RADIOLOGY

## 2019-12-02 NOTE — PLAN OF CARE
Outpatient Neurological Rehabilitation  Speech and Language Therapy Evaluation    Date: 12/2/2019     Name: Micheal Hunt   MRN: [id number]    Therapy Diagnosis: dysphagia  Physician: Messi Harris MD  Physician Orders: SLP video swallow  Medical Diagnosis from Referral: dysphagia unspecified    Visit #/Visits authorized: 1/ 1  Date of Evaluation:  12/2/2019   Insurance Authorization Period:  11/27/2019  Plan of Care Expiration:  10/31/2019      Time In: 1000  Time Out: 1031  Total Billable Time: 31 minutes  Procedure Min.   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 Pt reports globus sensation, coughing during po intake, unintended weight loss.  Denied other neuro or pulmonary diagnosis.     Past Medical History: Micheal Hunt  has a past medical history of NICK (acute kidney injury) (7/29/2016), Allergy, Anemia, mild (12/15/2014), Arthritis, Atrial fibrillation (03/2019), Benign essential HTN (3/27/2012), BMI 29.0-29.9,adult (5/10/2018), BPH (benign prostatic hyperplasia), BPH (benign prostatic hyperplasia), CAD (coronary artery disease) (2006), Carotid artery occlusion, Chronic kidney disease, Colon polyp, CRF (chronic renal failure), stage 5, Diabetes mellitus, Diverticulosis, ESRD (end stage renal disease) on dialysis, Gastritis, GERD (gastroesophageal reflux disease), Gout, History of colon polyps (5/3/2018), HTN (hypertension) (3/27/2012), Hyperlipidemia, Hyperlipidemia, LLL pneumonia (6/14/2018), LVH (left ventricular hypertrophy), Mesenteric ischemia, Murmur, cardiac (3/27/2012), GRICELDA (obstructive sleep apnea), Sinus problem, Stroke (03/2019), and Syncope and collapse.  Micheal Hunt  has a past surgical history that includes Shoulder surgery; rotative cuff; mid leftt finger; Appendectomy; Colonoscopy (2011);  Joint replacement; Mole removal (2016); Colonoscopy (N/A, 5/3/2018); Coronary artery bypass graft (4/2007); and Cardiac catheterization (2012).  Medical Hx and Allergies:  Micheal   Review of patient's allergies indicates:   Allergen Reactions    Ace inhibitors Other (See Comments)     Cough    Arb-angiotensin receptor antagonist Itching    Eplerenone Other (See Comments)     Marked bradycardia, 40, tiredness and weakness      Sulfa (sulfonamide antibiotics) Itching and Swelling     Patient says this was 10 years ago and doesn't remember what happened     Imaging: MRI Brain 3/26/19 -  New acute left PCA territory distribution infarctions involving the left paramedian occipital lobe, parahippocampal region and left splenium of the corpus callosum with additional acute evolving involving infarctions of the left thalamus.  No evidence of superimposed hemorrhage or hydrocephalus.  Generalized cerebral volume loss and findings suggestive of significant chronic microvascular ischemic change.       Xray Cervical Spine 10/17/18 - Mild straightening normal cervical lordosis.  Cervical vertebral bodies otherwise normal in height and alignment.  No acute fracture.  Partial interbody fusion of C5-C6.  No significant prevertebral soft tissue swelling.  There is moderate to severe multilevel degenerative disc disease with large bridging anterior osteophytosis.     Prior Therapy:  Currently receives  OT & PT, d/c from [facility]   Social History:  Lives in the community with daughter & family  Prior Level of Function: dependent, has caregiver during the day, family at night   Current Level of Function: reported coughing during every meal on solids & liquids, unintended weight loss last 2 months, c/o globus sensation.  Reported he takes pills whole with liquid without problem.  Pain:   0/10  Pain Location / Description: na    Nutrition:  po regular textures & liquids  Patient's Therapy Goals:  Why it sticks.  Objective      12/02/19  1000   Speech Time Calculation   Speech Start Time 1000   Speech Stop Time 1055   Speech Total (min) 55 min   General Information   SLP Treatment Date 12/02/19   Current Respiratory Status room air   General Precautions fall;aspiration   Current Diet   Current Diet Textures Regular   Current Liquid Consistencies Thin   Subjective   Patient states I feel like food is stuck (indicated throat)   Oral Musculature Evaluation   Oral Musculature left weakness   Dentition   (many missing molars, chews anteriorly)   Secretion Management right corner drooling  (very mild)   Mucosal Quality adequate   Mandibular Strength and Mobility WFL   Oral Labial Strength and Mobility WFL   Lingual Strength and Mobility WFL   Velar Elevation WFL   Buccal Strength and Mobility WFL   Volitional Cough adequate   Voice Prior to PO Intake clear, low volume        MBS Eval: Thin Liquid Trial   Mode of Presentation, Thin Liquid spoon;fed by clinician;cup;self fed   Volume of Thin Liquid Presented tsp, cup sip   Oral Prep/Phase, Thin Liquid WFL   Pharyngeal Phase, Thin Liquid cervical protrusion (comment);delayed pharyngeal swallow;no epiglottic inversion;premature spillage;pyriform sinuses pooling;reduction in laryngeal elevation;reduced hyolaryngeal excursion;vallecular pooling;vallecular stasis;immediate coughing/choking;impaired  (anterior solid cervical plate C2-6; mild residue)   Rosenbeck's 8-Point Penetration-Aspiration Scale, Thin Liquids (7) Material enters the airway, passes below the vocal folds, and is not ejected from the trachea despite effort.   Penetration/Aspiration, Thin Liquid aspiration before & after swallow, cough on 1 of 3 aspiration episodes, no sensation response to repeated penetration   Esophageal Phase, Thin aerophagia   Successful Strategies Trialed During Procedure, Thin Liquid   (tsp bolus penetrated not aspirated)   Additional Comments Severe pharyngeal dysphagia with repeated aspiration & reduced sensation  response        MBS Eval: Nectar Thick Liquid Trial   Mode of Presentation, Nectar spoon;fed by clinician;cup;straw;self fed   Volume of Nectar Presented tsp, cup sip, straw sip   Oral Prep/Phase, [place] WFL   Pharyngeal Phase, Nectar impaired;cervical protrusion (comment);delayed pharyngeal swallow;no epiglottic inversion;premature spillage;pyriform sinuses pooling;reduction in laryngeal elevation;reduced hyolaryngeal excursion;vallecular pooling;vallecular stasis  (mod vallecular residue)   Rosenbeck's 8-Point Penetration-Aspiration Scale, Nectar Thick Liquids (2) Material enters the airway, remains above the vocal folds, and is ejected from the airway.;(8) Material enters the airway, passes below the vocal folds, and no effort is made to eject.   Penetration/Aspiration, Nectar high penetration on tsp & cup trials, 1x asp on straw sip without CT, P&A elim with CT   Successful Strategies Trialed During Procedure, Nectar chin tuck   Diagnostic Statement moderate pharyngeal dysphagia, eliminated with chin tuck        MBS Eval: Pureed Trial   Mode of Presentation, Puree spoon   Volume of Puree Presented tsp   Oral Prep/Phase, Puree WFL   Pharyngeal Phase, Puree delayed pharyngeal swallow;no epiglottic inversion;decreased base of tongue retraction;cervical protrusion (comment);impaired;reduction in laryngeal elevation;reduced hyolaryngeal excursion;vallecular pooling;vallecular stasis  (mod residue)   Rosenbeck's 8-Point Penetration-Aspiration Scale, Pureed (1) Material does not enter airway.   Successful Strategies Trialed During Procedure, Puree alternate bite/sips  (multiple swallows reduced residue little, liquids did better)   Diagnostic Statement mild pharyngeal dysphagia        MBS Eval: Soft Solid Trial   Mode of Presentation, Semisolid spoon   Volume of Semisolid Food Presented 2 pcs peach in nectar thick barium   Oral Prep/Phase, Semisolid WFL  (anterior mastication)   Pharyngeal Phase, Semisolid  impaired;cervical protrusion (comment);no epiglottic inversion;reduction in laryngeal elevation;reduced hyolaryngeal excursion;pyriform sinuses pooling;premature spillage;vallecular pooling;vallecular stasis;delayed pharyngeal swallow  (mod residue)   Rosenbeck's 8-Point Penetration-Aspiration Scale, Semisolid (2) Material enters the airway, remains above the vocal folds, and is ejected from the airway.   Successful Strategies Trialed During Procedure, Semisolid chin tuck   Diagnostic Statement mild pharyngeal dysphagia        MBS Eval: Solid Food Texture Trial   Mode of Presentation, Solid spoon   Volume of Solid Food Presented 1/2 cookie with barium pudding   Oral Prep/Phase, Solid WFL  (anterior mastication)   Pharyngeal Phase, Solid impaired;cervical protrusion (comment);no epiglottic inversion;delayed pharyngeal swallow;reduction in laryngeal elevation;reduced hyolaryngeal excursion;vallecular pooling;vallecular stasis  (moderate vallec residue)   Rosenbeck's 8-Point Penetration-Aspiration Scale, Solid (8) Material enters the airway, passes below the vocal folds, and no effort is made to eject.   Penetration/Aspiration crumb aspiration without sensation response   Successful Strategies Trialed During Procedure, Solid   (cued hard cough ejected crumb)   Diagnostic Statement moderate-severe pharyngeal dysphagia due to SILENT aspiration of crumb   Recommendations   Solid Diet Level Regular  (no dry, crumbly foods, moisten meats)   Liquid Diet Level Nectar Thick   Additional Recommendations no fresh fruit except bananas, thicken liquid on canned fruit, milk in cereal, liquid in soups, etc.   Plan   Plan Dysphagia Therapy   SLP Follow-up   SLP Follow-up? Yes   Treatment/Billable Minutes   Eval Swallow and Oral Function 10   Motion Fluoro Swallow, Cine/Vid 21   Evaluation Use/Fit Voiced Prosthetic Device 24   Total Time 55       JOSE NOMS (National Outcome Measure System): Severity Modifier for Medicare G-Code:  Current Swallowing CJ, Goal CH    Treatment   Treatment Time In: 1031  Treatment Time Out: 1055  Total Treatment Time: 24    Education: aspiration precautions  and Swallowing Guidelines in written form with explanation of findings & recommendations. Patient and CNA expressed understanding.     Home Program: Home Health dysphagia therapy to include laryngeal/pharyngeal exercises (Jessica, Mendelsohn, CTAR, effortful swallows, supraglottic swallow with throat clear), preparation of nectar thick liquids, follow swallowing guidelines, swallow 2x per bite/sip, alternate bites & sips.  He may also benefit from learning to perform oral prep set before initiating oral bolus transfer A->P.    Assessment   Micheal is a 79 y.o. male referred to outpatient SpeechTherapy with a medical diagnosis of dysphagia, and was noted to have been diagnosed with cervical bone spurs.  Pt presented with mild oral prep dysphagia on solids due to missing molars, and pharyngeal dysphagia with penetration & sometimes SILENT aspiration on thin liquids, and an episode of aspiration of a crumb of cookie both due to delayed onset of the pharyngeal swallow.  Hyolaryngeal rise & tilt were reduced, and there was no epiglottic inversion, largely due to the presence of an anterior cervical protrusion from C2-6.  Multiple swallows reduced but never eliminated pharyngeal residue, liquid wash was more effective, although residue remained.  Chin tuck and small sips eliminated aspiration on nectar thick but not on thin liquids.  Head turn did not eliminate aspiration.  An esophageal sweep revealed aerophagia, but nothing which interfered with pharyngeal swallow, and nothing which explained globus sensation.  Patient will benefit from skilled dysphagia therapy.  Additionally he would benefit from physical therapy to improve endurance to sit up straight during po intake (reduce dependent larynx).    Recommend regular textures, no dry, crumbly food, moisten all meats  & foods which fall into small pieces such as rice.  Nectar thick liquids, thicken all liquid on canned fruit, milk on cereal & liquid in foods such as soups; no fresh fruit except bananas.  Take pills whole in puree followed by swallow of nectar thick liquid.  If c/o pills sticking in throat, crush pills, if allowed.    Plan   Plan of Care Certification: 12/2/2019  to 12/2/2019  Repeat MBSS after 8 weeks of dysphagia tx.    Therapist's Name:   Susanna Chung CCC-SLP/A     Date: 12/2/2019

## 2019-12-09 ENCOUNTER — TELEPHONE (OUTPATIENT)
Dept: FAMILY MEDICINE | Facility: CLINIC | Age: 79
End: 2019-12-09

## 2019-12-09 DIAGNOSIS — I69.391 DYSPHAGIA DUE TO OLD STROKE: Primary | ICD-10-CM

## 2019-12-09 NOTE — TELEPHONE ENCOUNTER
Dr. Lakhani received a message from Susanna Chung, CCC-SLP/A on 12-4-19 requesting Dr. Lakhani sign MBSS report and order home health dysphagia therapy for patient. Per Dr. Lakhani's request call placed to Mrs. Mullins regarding. No answer received. Left message requesting return call to office regarding request.

## 2019-12-09 NOTE — TELEPHONE ENCOUNTER
Spoke to Mrs. Mullins who indicates patient was evaluated by Dr. Harris. States it has been determined patient aspirates/sometimes silently. Patient was discharged from home health speech therapy. Need order for Dyspharia Therapy per home health (8 weeks min) and then a follow up Modium Barium Swallow to be performed in 8 weeks. Please advise.

## 2019-12-09 NOTE — TELEPHONE ENCOUNTER
----- Message from Lewis Anderson sent at 12/9/2019 12:54 PM CST -----  Contact: Daughter/Tonya Lynch called and mention that she wanted to follow up with someone at the office regarding a request for speech thearpy about orders that needs to be submitted to Home Health. Ms Castaneda can be reached at the following number    [phone]

## 2019-12-09 NOTE — TELEPHONE ENCOUNTER
Poor swallowing.  Diagnoses and all orders for this visit:    Dysphagia due to old stroke  -     Ambulatory consult to Speech Therapy

## 2019-12-09 NOTE — TELEPHONE ENCOUNTER
Orders faxed to [employer] at [phone]. Patient's daughter (Tonya) notified. Verbalized understanding.

## 2020-01-01 ENCOUNTER — OFFICE VISIT (OUTPATIENT)
Dept: PRIMARY CARE CLINIC | Facility: CLINIC | Age: 80
End: 2020-01-01
Payer: MEDICARE

## 2020-01-01 ENCOUNTER — TELEPHONE (OUTPATIENT)
Dept: FAMILY MEDICINE | Facility: CLINIC | Age: 80
End: 2020-01-01

## 2020-01-01 ENCOUNTER — DOCUMENT SCAN (OUTPATIENT)
Dept: HOME HEALTH SERVICES | Facility: HOSPITAL | Age: 80
End: 2020-01-01
Payer: MEDICARE

## 2020-01-01 ENCOUNTER — TELEPHONE (OUTPATIENT)
Dept: PHARMACY | Facility: AMBULARY SURGERY CENTER | Age: 80
End: 2020-01-01

## 2020-01-01 ENCOUNTER — TELEPHONE (OUTPATIENT)
Dept: GASTROENTEROLOGY | Facility: CLINIC | Age: 80
End: 2020-01-01

## 2020-01-01 ENCOUNTER — HOSPITAL ENCOUNTER (EMERGENCY)
Facility: HOSPITAL | Age: 80
Discharge: HOME | End: 2020-09-29
Attending: EMERGENCY MEDICINE
Payer: MEDICARE

## 2020-01-01 ENCOUNTER — ANESTHESIA (OUTPATIENT)
Dept: ENDOSCOPY | Facility: HOSPITAL | Age: 80
DRG: 377 | End: 2020-01-01
Payer: MEDICARE

## 2020-01-01 ENCOUNTER — PATIENT MESSAGE (OUTPATIENT)
Dept: GASTROENTEROLOGY | Facility: CLINIC | Age: 80
End: 2020-01-01

## 2020-01-01 ENCOUNTER — PATIENT MESSAGE (OUTPATIENT)
Dept: FAMILY MEDICINE | Facility: CLINIC | Age: 80
End: 2020-01-01

## 2020-01-01 ENCOUNTER — PATIENT MESSAGE (OUTPATIENT)
Dept: OPHTHALMOLOGY | Facility: CLINIC | Age: 80
End: 2020-01-01

## 2020-01-01 ENCOUNTER — HOSPITAL ENCOUNTER (INPATIENT)
Facility: HOSPITAL | Age: 80
LOS: 2 days | Discharge: HOME HEALTH | DRG: 871 | End: 2020-08-24
Attending: EMERGENCY MEDICINE | Admitting: HOSPITALIST
Payer: MEDICARE

## 2020-01-01 ENCOUNTER — ANESTHESIA EVENT (OUTPATIENT)
Dept: ENDOSCOPY | Facility: HOSPITAL | Age: 80
DRG: 377 | End: 2020-01-01
Payer: MEDICARE

## 2020-01-01 ENCOUNTER — HOSPITAL ENCOUNTER (OUTPATIENT)
Facility: HOSPITAL | Age: 80
Discharge: HOME | End: 2020-11-05
Attending: INTERNAL MEDICINE | Admitting: INTERNAL MEDICINE
Payer: MEDICARE

## 2020-01-01 ENCOUNTER — OFFICE VISIT (OUTPATIENT)
Dept: FAMILY MEDICINE | Facility: CLINIC | Age: 80
End: 2020-01-01
Payer: MEDICARE

## 2020-01-01 ENCOUNTER — HOSPITAL ENCOUNTER (OUTPATIENT)
Facility: HOSPITAL | Age: 80
Discharge: HOME | End: 2020-11-20
Attending: EMERGENCY MEDICINE | Admitting: HOSPITALIST
Payer: MEDICARE

## 2020-01-01 ENCOUNTER — OUTPATIENT CASE MANAGEMENT (OUTPATIENT)
Dept: ADMINISTRATIVE | Facility: OTHER | Age: 80
End: 2020-01-01

## 2020-01-01 ENCOUNTER — EXTERNAL HOME HEALTH (OUTPATIENT)
Dept: HOME HEALTH SERVICES | Facility: HOSPITAL | Age: 80
End: 2020-01-01

## 2020-01-01 ENCOUNTER — EXTERNAL HOME HEALTH (OUTPATIENT)
Dept: HOME HEALTH SERVICES | Facility: HOSPITAL | Age: 80
End: 2020-01-01
Payer: MEDICARE

## 2020-01-01 ENCOUNTER — HOSPITAL ENCOUNTER (OUTPATIENT)
Facility: HOSPITAL | Age: 80
Discharge: HOME HEALTH | End: 2020-09-24
Attending: EMERGENCY MEDICINE | Admitting: HOSPITALIST
Payer: MEDICARE

## 2020-01-01 ENCOUNTER — TELEPHONE (OUTPATIENT)
Dept: PRIMARY CARE CLINIC | Facility: CLINIC | Age: 80
End: 2020-01-01

## 2020-01-01 ENCOUNTER — PATIENT MESSAGE (OUTPATIENT)
Dept: ADMINISTRATIVE | Facility: HOSPITAL | Age: 80
End: 2020-01-01

## 2020-01-01 ENCOUNTER — HOSPITAL ENCOUNTER (INPATIENT)
Facility: HOSPITAL | Age: 80
LOS: 3 days | Discharge: HOME HEALTH | DRG: 377 | End: 2020-08-19
Attending: EMERGENCY MEDICINE | Admitting: HOSPITALIST
Payer: MEDICARE

## 2020-01-01 ENCOUNTER — TELEPHONE (OUTPATIENT)
Dept: ENDOSCOPY | Facility: HOSPITAL | Age: 80
End: 2020-01-01

## 2020-01-01 ENCOUNTER — HOSPITAL ENCOUNTER (OUTPATIENT)
Dept: RADIOLOGY | Facility: HOSPITAL | Age: 80
Discharge: HOME | End: 2020-12-08
Attending: INTERNAL MEDICINE
Payer: MEDICARE

## 2020-01-01 ENCOUNTER — PATIENT MESSAGE (OUTPATIENT)
Dept: PRIMARY CARE CLINIC | Facility: CLINIC | Age: 80
End: 2020-01-01

## 2020-01-01 ENCOUNTER — ANESTHESIA (OUTPATIENT)
Dept: ENDOSCOPY | Facility: HOSPITAL | Age: 80
End: 2020-01-01
Payer: MEDICARE

## 2020-01-01 ENCOUNTER — PATIENT OUTREACH (OUTPATIENT)
Dept: HOME HEALTH SERVICES | Facility: HOSPITAL | Age: 80
End: 2020-01-01

## 2020-01-01 ENCOUNTER — LAB VISIT (OUTPATIENT)
Dept: PRIMARY CARE CLINIC | Facility: CLINIC | Age: 80
End: 2020-01-01
Payer: MEDICARE

## 2020-01-01 ENCOUNTER — HOSPITAL ENCOUNTER (EMERGENCY)
Facility: HOSPITAL | Age: 80
Discharge: HOME | End: 2020-09-30
Attending: EMERGENCY MEDICINE
Payer: MEDICARE

## 2020-01-01 ENCOUNTER — TELEPHONE (OUTPATIENT)
Dept: MEDSURG UNIT | Facility: HOSPITAL | Age: 80
End: 2020-01-01

## 2020-01-01 ENCOUNTER — ANESTHESIA EVENT (OUTPATIENT)
Dept: ENDOSCOPY | Facility: HOSPITAL | Age: 80
End: 2020-01-01
Payer: MEDICARE

## 2020-01-01 ENCOUNTER — OFFICE VISIT (OUTPATIENT)
Dept: GASTROENTEROLOGY | Facility: CLINIC | Age: 80
End: 2020-01-01
Payer: MEDICARE

## 2020-01-01 ENCOUNTER — PATIENT OUTREACH (OUTPATIENT)
Dept: ADMINISTRATIVE | Facility: CLINIC | Age: 80
End: 2020-01-01

## 2020-01-01 ENCOUNTER — PATIENT MESSAGE (OUTPATIENT)
Dept: OTHER | Facility: OTHER | Age: 80
End: 2020-01-01

## 2020-01-01 ENCOUNTER — TELEPHONE (OUTPATIENT)
Dept: REHABILITATION | Facility: HOSPITAL | Age: 80
End: 2020-01-01

## 2020-01-01 VITALS
BODY MASS INDEX: 25.04 KG/M2 | HEART RATE: 60 BPM | TEMPERATURE: 99 F | SYSTOLIC BLOOD PRESSURE: 120 MMHG | TEMPERATURE: 98 F | OXYGEN SATURATION: 98 % | RESPIRATION RATE: 18 BRPM | SYSTOLIC BLOOD PRESSURE: 167 MMHG | WEIGHT: 188.94 LBS | DIASTOLIC BLOOD PRESSURE: 70 MMHG | WEIGHT: 188 LBS | HEART RATE: 63 BPM | OXYGEN SATURATION: 100 % | DIASTOLIC BLOOD PRESSURE: 77 MMHG | BODY MASS INDEX: 24.8 KG/M2 | HEIGHT: 73 IN

## 2020-01-01 VITALS — HEART RATE: 76 BPM | DIASTOLIC BLOOD PRESSURE: 82 MMHG | SYSTOLIC BLOOD PRESSURE: 128 MMHG

## 2020-01-01 VITALS
HEART RATE: 78 BPM | DIASTOLIC BLOOD PRESSURE: 70 MMHG | TEMPERATURE: 97 F | WEIGHT: 173 LBS | SYSTOLIC BLOOD PRESSURE: 120 MMHG | RESPIRATION RATE: 16 BRPM | HEIGHT: 73 IN | BODY MASS INDEX: 22.93 KG/M2 | OXYGEN SATURATION: 98 %

## 2020-01-01 VITALS
DIASTOLIC BLOOD PRESSURE: 90 MMHG | BODY MASS INDEX: 24.72 KG/M2 | SYSTOLIC BLOOD PRESSURE: 194 MMHG | TEMPERATURE: 98 F | OXYGEN SATURATION: 100 % | HEART RATE: 77 BPM | RESPIRATION RATE: 18 BRPM | WEIGHT: 187.38 LBS

## 2020-01-01 VITALS
DIASTOLIC BLOOD PRESSURE: 64 MMHG | OXYGEN SATURATION: 98 % | TEMPERATURE: 97 F | HEART RATE: 69 BPM | SYSTOLIC BLOOD PRESSURE: 132 MMHG

## 2020-01-01 VITALS
OXYGEN SATURATION: 99 % | BODY MASS INDEX: 23.9 KG/M2 | TEMPERATURE: 98 F | SYSTOLIC BLOOD PRESSURE: 150 MMHG | HEART RATE: 62 BPM | RESPIRATION RATE: 16 BRPM | DIASTOLIC BLOOD PRESSURE: 66 MMHG | WEIGHT: 180.31 LBS | HEIGHT: 73 IN

## 2020-01-01 VITALS
SYSTOLIC BLOOD PRESSURE: 129 MMHG | TEMPERATURE: 98 F | BODY MASS INDEX: 24.8 KG/M2 | OXYGEN SATURATION: 96 % | RESPIRATION RATE: 18 BRPM | WEIGHT: 188 LBS | DIASTOLIC BLOOD PRESSURE: 77 MMHG | HEART RATE: 70 BPM

## 2020-01-01 VITALS
BODY MASS INDEX: 24.41 KG/M2 | RESPIRATION RATE: 18 BRPM | SYSTOLIC BLOOD PRESSURE: 153 MMHG | WEIGHT: 185 LBS | OXYGEN SATURATION: 100 % | DIASTOLIC BLOOD PRESSURE: 75 MMHG | TEMPERATURE: 97 F | HEART RATE: 58 BPM

## 2020-01-01 VITALS
HEIGHT: 73 IN | WEIGHT: 182.75 LBS | TEMPERATURE: 98 F | BODY MASS INDEX: 24.22 KG/M2 | SYSTOLIC BLOOD PRESSURE: 142 MMHG | DIASTOLIC BLOOD PRESSURE: 72 MMHG | HEART RATE: 72 BPM | OXYGEN SATURATION: 99 %

## 2020-01-01 VITALS
WEIGHT: 190.06 LBS | OXYGEN SATURATION: 99 % | BODY MASS INDEX: 25.19 KG/M2 | DIASTOLIC BLOOD PRESSURE: 80 MMHG | TEMPERATURE: 97 F | HEIGHT: 73 IN | RESPIRATION RATE: 16 BRPM | SYSTOLIC BLOOD PRESSURE: 143 MMHG | HEART RATE: 68 BPM

## 2020-01-01 VITALS
SYSTOLIC BLOOD PRESSURE: 134 MMHG | TEMPERATURE: 97 F | OXYGEN SATURATION: 100 % | HEIGHT: 73 IN | DIASTOLIC BLOOD PRESSURE: 68 MMHG | HEART RATE: 61 BPM | RESPIRATION RATE: 18 BRPM | BODY MASS INDEX: 24.11 KG/M2 | WEIGHT: 181.88 LBS

## 2020-01-01 DIAGNOSIS — R65.21 SEPTIC SHOCK: Primary | ICD-10-CM

## 2020-01-01 DIAGNOSIS — I69.320 HEMIPARESIS, APHASIA, AND DYSPHAGIA AS LATE EFFECTS OF STROKE: ICD-10-CM

## 2020-01-01 DIAGNOSIS — G40.909 SEIZURE DISORDER: Primary | ICD-10-CM

## 2020-01-01 DIAGNOSIS — I48.0 PAROXYSMAL ATRIAL FIBRILLATION: ICD-10-CM

## 2020-01-01 DIAGNOSIS — M10.9 GOUT, ARTHRITIS: ICD-10-CM

## 2020-01-01 DIAGNOSIS — N30.01 ACUTE CYSTITIS WITH HEMATURIA: Primary | ICD-10-CM

## 2020-01-01 DIAGNOSIS — N18.5 CRF (CHRONIC RENAL FAILURE), STAGE 5: ICD-10-CM

## 2020-01-01 DIAGNOSIS — I10 ESSENTIAL HYPERTENSION: ICD-10-CM

## 2020-01-01 DIAGNOSIS — K59.00 CONSTIPATION, UNSPECIFIED CONSTIPATION TYPE: Primary | ICD-10-CM

## 2020-01-01 DIAGNOSIS — D84.9 IMMUNODEFICIENCY, UNSPECIFIED: ICD-10-CM

## 2020-01-01 DIAGNOSIS — I35.0 AORTIC VALVE STENOSIS, ETIOLOGY OF CARDIAC VALVE DISEASE UNSPECIFIED: ICD-10-CM

## 2020-01-01 DIAGNOSIS — N18.6 ESRD (END STAGE RENAL DISEASE) ON DIALYSIS: ICD-10-CM

## 2020-01-01 DIAGNOSIS — K57.90 DIVERTICULOSIS: ICD-10-CM

## 2020-01-01 DIAGNOSIS — K59.09 OTHER CONSTIPATION: Primary | ICD-10-CM

## 2020-01-01 DIAGNOSIS — R05.9 COUGH: Primary | ICD-10-CM

## 2020-01-01 DIAGNOSIS — K92.2 GASTROINTESTINAL HEMORRHAGE, UNSPECIFIED GASTROINTESTINAL HEMORRHAGE TYPE: ICD-10-CM

## 2020-01-01 DIAGNOSIS — R73.02 GLUCOSE INTOLERANCE (IMPAIRED GLUCOSE TOLERANCE): ICD-10-CM

## 2020-01-01 DIAGNOSIS — Z99.2 ESRD (END STAGE RENAL DISEASE) ON DIALYSIS: ICD-10-CM

## 2020-01-01 DIAGNOSIS — Z93.1 PEG (PERCUTANEOUS ENDOSCOPIC GASTROSTOMY) STATUS: Primary | ICD-10-CM

## 2020-01-01 DIAGNOSIS — Z86.010 PERSONAL HISTORY OF COLONIC POLYPS: ICD-10-CM

## 2020-01-01 DIAGNOSIS — E87.5 HYPERKALEMIA: ICD-10-CM

## 2020-01-01 DIAGNOSIS — I69.359 HEMIPARESIS, APHASIA, AND DYSPHAGIA AS LATE EFFECTS OF STROKE: ICD-10-CM

## 2020-01-01 DIAGNOSIS — Z99.2 HYPERTENSION DUE TO END STAGE RENAL DISEASE CAUSED BY TYPE 2 DIABETES MELLITUS, ON DIALYSIS: ICD-10-CM

## 2020-01-01 DIAGNOSIS — I69.391 HEMIPARESIS, APHASIA, AND DYSPHAGIA AS LATE EFFECTS OF STROKE: ICD-10-CM

## 2020-01-01 DIAGNOSIS — I10 ESSENTIAL HYPERTENSION: Primary | ICD-10-CM

## 2020-01-01 DIAGNOSIS — E11.22 HYPERTENSION DUE TO END STAGE RENAL DISEASE CAUSED BY TYPE 2 DIABETES MELLITUS, ON DIALYSIS: ICD-10-CM

## 2020-01-01 DIAGNOSIS — E44.1 MILD MALNUTRITION: ICD-10-CM

## 2020-01-01 DIAGNOSIS — K92.1 HEMATOCHEZIA: ICD-10-CM

## 2020-01-01 DIAGNOSIS — N18.4 TYPE 2 DM WITH CKD STAGE 4 AND HYPERTENSION: ICD-10-CM

## 2020-01-01 DIAGNOSIS — K92.2 GASTROINTESTINAL HEMORRHAGE, UNSPECIFIED GASTROINTESTINAL HEMORRHAGE TYPE: Primary | ICD-10-CM

## 2020-01-01 DIAGNOSIS — G40.909 SEIZURE DISORDER: ICD-10-CM

## 2020-01-01 DIAGNOSIS — I70.0 AORTIC CALCIFICATION: ICD-10-CM

## 2020-01-01 DIAGNOSIS — Z86.73 HISTORY OF STROKE: ICD-10-CM

## 2020-01-01 DIAGNOSIS — I50.22 CHRONIC SYSTOLIC HEART FAILURE: ICD-10-CM

## 2020-01-01 DIAGNOSIS — K31.89 GASTRIC NODULE: Primary | ICD-10-CM

## 2020-01-01 DIAGNOSIS — I12.9 TYPE 2 DM WITH CKD STAGE 4 AND HYPERTENSION: ICD-10-CM

## 2020-01-01 DIAGNOSIS — N18.4 CKD (CHRONIC KIDNEY DISEASE), STAGE IV: Primary | ICD-10-CM

## 2020-01-01 DIAGNOSIS — Z93.1 PEG (PERCUTANEOUS ENDOSCOPIC GASTROSTOMY) STATUS: ICD-10-CM

## 2020-01-01 DIAGNOSIS — I67.89 ISCHEMIC ENCEPHALOPATHY: ICD-10-CM

## 2020-01-01 DIAGNOSIS — J44.89 COPD WITH ASTHMA: ICD-10-CM

## 2020-01-01 DIAGNOSIS — E11.22 TYPE 2 DM WITH CKD STAGE 4 AND HYPERTENSION: ICD-10-CM

## 2020-01-01 DIAGNOSIS — N18.6 HYPERTENSION DUE TO END STAGE RENAL DISEASE CAUSED BY TYPE 2 DIABETES MELLITUS, ON DIALYSIS: ICD-10-CM

## 2020-01-01 DIAGNOSIS — D50.8 IRON DEFICIENCY ANEMIA SECONDARY TO INADEQUATE DIETARY IRON INTAKE: ICD-10-CM

## 2020-01-01 DIAGNOSIS — K57.32 SIGMOID DIVERTICULITIS: ICD-10-CM

## 2020-01-01 DIAGNOSIS — Z43.1 PEG (PERCUTANEOUS ENDOSCOPIC GASTROSTOMY) ADJUSTMENT/REPLACEMENT/REMOVAL: ICD-10-CM

## 2020-01-01 DIAGNOSIS — J45.30 MILD PERSISTENT ASTHMA WITHOUT COMPLICATION: ICD-10-CM

## 2020-01-01 DIAGNOSIS — I69.351 HEMIPLEGIA AND HEMIPARESIS FOLLOWING CEREBRAL INFARCTION AFFECTING RIGHT DOMINANT SIDE: ICD-10-CM

## 2020-01-01 DIAGNOSIS — E78.49 OTHER HYPERLIPIDEMIA: ICD-10-CM

## 2020-01-01 DIAGNOSIS — I25.10 CORONARY ARTERY DISEASE INVOLVING NATIVE CORONARY ARTERY OF NATIVE HEART WITHOUT ANGINA PECTORIS: ICD-10-CM

## 2020-01-01 DIAGNOSIS — Z01.818 PRE-OP TESTING: ICD-10-CM

## 2020-01-01 DIAGNOSIS — G45.9 TIA (TRANSIENT ISCHEMIC ATTACK): ICD-10-CM

## 2020-01-01 DIAGNOSIS — K21.9 GASTROESOPHAGEAL REFLUX DISEASE, ESOPHAGITIS PRESENCE NOT SPECIFIED: ICD-10-CM

## 2020-01-01 DIAGNOSIS — K85.90 PANCREATITIS: Primary | ICD-10-CM

## 2020-01-01 DIAGNOSIS — I27.20 PULMONARY HYPERTENSION: ICD-10-CM

## 2020-01-01 DIAGNOSIS — I12.0 HYPERTENSION DUE TO END STAGE RENAL DISEASE CAUSED BY TYPE 2 DIABETES MELLITUS, ON DIALYSIS: ICD-10-CM

## 2020-01-01 DIAGNOSIS — K92.2 GASTROINTESTINAL HEMORRHAGE: ICD-10-CM

## 2020-01-01 DIAGNOSIS — I66.22: ICD-10-CM

## 2020-01-01 DIAGNOSIS — I69.351 SPASTIC HEMIPLEGIA OF RIGHT DOMINANT SIDE AS LATE EFFECT OF CEREBRAL INFARCTION: ICD-10-CM

## 2020-01-01 DIAGNOSIS — R41.841 COGNITIVE COMMUNICATION DISORDER: ICD-10-CM

## 2020-01-01 DIAGNOSIS — F01.518 VASCULAR DEMENTIA WITH BEHAVIOR DISTURBANCE: ICD-10-CM

## 2020-01-01 DIAGNOSIS — R07.9 CHEST PAIN: ICD-10-CM

## 2020-01-01 DIAGNOSIS — I48.19 PERSISTENT ATRIAL FIBRILLATION: ICD-10-CM

## 2020-01-01 DIAGNOSIS — R35.0 URINARY FREQUENCY: ICD-10-CM

## 2020-01-01 DIAGNOSIS — R11.10 VOMITING: ICD-10-CM

## 2020-01-01 DIAGNOSIS — Z99.3 WHEELCHAIR DEPENDENCE: ICD-10-CM

## 2020-01-01 DIAGNOSIS — R29.90 EPISODE OF TRANSIENT NEUROLOGIC SYMPTOMS: ICD-10-CM

## 2020-01-01 DIAGNOSIS — R05.9 COUGH: ICD-10-CM

## 2020-01-01 DIAGNOSIS — S60.512A ABRASION OF LEFT HAND, INITIAL ENCOUNTER: ICD-10-CM

## 2020-01-01 DIAGNOSIS — A41.9 SEPTIC SHOCK: Primary | ICD-10-CM

## 2020-01-01 DIAGNOSIS — F32.9 REACTIVE DEPRESSION: ICD-10-CM

## 2020-01-01 DIAGNOSIS — I63.9 STROKE: Primary | ICD-10-CM

## 2020-01-01 LAB
ABO + RH BLD: NORMAL
ALBUMIN SERPL BCP-MCNC: 2.8 G/DL (ref 3.5–5.2)
ALBUMIN SERPL BCP-MCNC: 2.9 G/DL (ref 3.5–5.2)
ALBUMIN SERPL BCP-MCNC: 3.3 G/DL (ref 3.5–5.2)
ALBUMIN SERPL BCP-MCNC: 3.5 G/DL (ref 3.5–5.2)
ALBUMIN SERPL BCP-MCNC: 3.6 G/DL (ref 3.5–5.2)
ALBUMIN SERPL BCP-MCNC: 3.8 G/DL (ref 3.5–5.2)
ALBUMIN SERPL BCP-MCNC: 3.8 G/DL (ref 3.5–5.2)
ALBUMIN SERPL BCP-MCNC: 3.9 G/DL (ref 3.5–5.2)
ALBUMIN SERPL BCP-MCNC: 4.1 G/DL (ref 3.5–5.2)
ALP SERPL-CCNC: 100 U/L (ref 55–135)
ALP SERPL-CCNC: 107 U/L (ref 55–135)
ALP SERPL-CCNC: 65 U/L (ref 55–135)
ALP SERPL-CCNC: 67 U/L (ref 55–135)
ALP SERPL-CCNC: 69 U/L (ref 55–135)
ALP SERPL-CCNC: 77 U/L (ref 55–135)
ALP SERPL-CCNC: 79 U/L (ref 55–135)
ALP SERPL-CCNC: 85 U/L (ref 55–135)
ALP SERPL-CCNC: 87 U/L (ref 55–135)
ALP SERPL-CCNC: 92 U/L (ref 55–135)
ALP SERPL-CCNC: 92 U/L (ref 55–135)
ALP SERPL-CCNC: 99 U/L (ref 55–135)
ALT SERPL W/O P-5'-P-CCNC: 10 U/L (ref 10–44)
ALT SERPL W/O P-5'-P-CCNC: 11 U/L (ref 10–44)
ALT SERPL W/O P-5'-P-CCNC: 12 U/L (ref 10–44)
ALT SERPL W/O P-5'-P-CCNC: 13 U/L (ref 10–44)
ALT SERPL W/O P-5'-P-CCNC: 15 U/L (ref 10–44)
ALT SERPL W/O P-5'-P-CCNC: 6 U/L (ref 10–44)
ALT SERPL W/O P-5'-P-CCNC: 7 U/L (ref 10–44)
ALT SERPL W/O P-5'-P-CCNC: 9 U/L (ref 10–44)
ANION GAP SERPL CALC-SCNC: 10 MMOL/L (ref 8–16)
ANION GAP SERPL CALC-SCNC: 11 MMOL/L (ref 8–16)
ANION GAP SERPL CALC-SCNC: 11 MMOL/L (ref 8–16)
ANION GAP SERPL CALC-SCNC: 12 MMOL/L (ref 8–16)
ANION GAP SERPL CALC-SCNC: 12 MMOL/L (ref 8–16)
ANION GAP SERPL CALC-SCNC: 13 MMOL/L (ref 8–16)
ANION GAP SERPL CALC-SCNC: 15 MMOL/L (ref 8–16)
ANION GAP SERPL CALC-SCNC: 16 MMOL/L (ref 8–16)
ANION GAP SERPL CALC-SCNC: 9 MMOL/L (ref 8–16)
AORTIC ROOT ANNULUS: 3.77 CM
AORTIC VALVE CUSP SEPERATION: 1.1 CM
APTT BLDCRRT: 27.7 SEC (ref 21–32)
AST SERPL-CCNC: 10 U/L (ref 10–40)
AST SERPL-CCNC: 10 U/L (ref 10–40)
AST SERPL-CCNC: 14 U/L (ref 10–40)
AST SERPL-CCNC: 15 U/L (ref 10–40)
AST SERPL-CCNC: 15 U/L (ref 10–40)
AST SERPL-CCNC: 16 U/L (ref 10–40)
AST SERPL-CCNC: 6 U/L (ref 10–40)
AST SERPL-CCNC: 6 U/L (ref 10–40)
AST SERPL-CCNC: 7 U/L (ref 10–40)
AST SERPL-CCNC: 9 U/L (ref 10–40)
AV INDEX (PROSTH): 0.33
AV MEAN GRADIENT: 26 MMHG
AV PEAK GRADIENT: 46 MMHG
AV VALVE AREA: 1.04 CM2
AV VELOCITY RATIO: 0.3
BACTERIA #/AREA URNS HPF: ABNORMAL /HPF
BACTERIA #/AREA URNS HPF: ABNORMAL /HPF
BACTERIA BLD CULT: NORMAL
BACTERIA BLD CULT: NORMAL
BACTERIA UR CULT: NORMAL
BACTERIA UR CULT: NORMAL
BASOPHILS # BLD AUTO: 0.01 K/UL (ref 0–0.2)
BASOPHILS # BLD AUTO: 0.02 K/UL (ref 0–0.2)
BASOPHILS # BLD AUTO: 0.02 K/UL (ref 0–0.2)
BASOPHILS # BLD AUTO: 0.03 K/UL (ref 0–0.2)
BASOPHILS # BLD AUTO: 0.04 K/UL (ref 0–0.2)
BASOPHILS # BLD AUTO: 0.04 K/UL (ref 0–0.2)
BASOPHILS NFR BLD: 0.1 % (ref 0–1.9)
BASOPHILS NFR BLD: 0.2 % (ref 0–1.9)
BASOPHILS NFR BLD: 0.3 % (ref 0–1.9)
BASOPHILS NFR BLD: 0.4 % (ref 0–1.9)
BASOPHILS NFR BLD: 0.5 % (ref 0–1.9)
BILIRUB SERPL-MCNC: 0.3 MG/DL (ref 0.1–1)
BILIRUB SERPL-MCNC: 0.4 MG/DL (ref 0.1–1)
BILIRUB SERPL-MCNC: 0.5 MG/DL (ref 0.1–1)
BILIRUB UR QL STRIP: NEGATIVE
BILIRUB UR QL STRIP: NEGATIVE
BLD GP AB SCN CELLS X3 SERPL QL: NORMAL
BSA FOR ECHO PROCEDURE: 2.12 M2
BUN SERPL-MCNC: 18 MG/DL (ref 8–23)
BUN SERPL-MCNC: 23 MG/DL (ref 8–23)
BUN SERPL-MCNC: 27 MG/DL (ref 8–23)
BUN SERPL-MCNC: 29 MG/DL (ref 8–23)
BUN SERPL-MCNC: 30 MG/DL (ref 8–23)
BUN SERPL-MCNC: 31 MG/DL (ref 8–23)
BUN SERPL-MCNC: 34 MG/DL (ref 8–23)
BUN SERPL-MCNC: 44 MG/DL (ref 8–23)
BUN SERPL-MCNC: 46 MG/DL (ref 8–23)
BUN SERPL-MCNC: 47 MG/DL (ref 8–23)
BUN SERPL-MCNC: 52 MG/DL (ref 8–23)
BUN SERPL-MCNC: 55 MG/DL (ref 8–23)
BUN SERPL-MCNC: 62 MG/DL (ref 8–23)
CALCIUM SERPL-MCNC: 8.2 MG/DL (ref 8.7–10.5)
CALCIUM SERPL-MCNC: 8.3 MG/DL (ref 8.7–10.5)
CALCIUM SERPL-MCNC: 8.5 MG/DL (ref 8.7–10.5)
CALCIUM SERPL-MCNC: 8.5 MG/DL (ref 8.7–10.5)
CALCIUM SERPL-MCNC: 8.8 MG/DL (ref 8.7–10.5)
CALCIUM SERPL-MCNC: 8.8 MG/DL (ref 8.7–10.5)
CALCIUM SERPL-MCNC: 8.9 MG/DL (ref 8.7–10.5)
CALCIUM SERPL-MCNC: 8.9 MG/DL (ref 8.7–10.5)
CALCIUM SERPL-MCNC: 9 MG/DL (ref 8.7–10.5)
CALCIUM SERPL-MCNC: 9.1 MG/DL (ref 8.7–10.5)
CALCIUM SERPL-MCNC: 9.2 MG/DL (ref 8.7–10.5)
CALCIUM SERPL-MCNC: 9.4 MG/DL (ref 8.7–10.5)
CHLORIDE SERPL-SCNC: 100 MMOL/L (ref 95–110)
CHLORIDE SERPL-SCNC: 100 MMOL/L (ref 95–110)
CHLORIDE SERPL-SCNC: 103 MMOL/L (ref 95–110)
CHLORIDE SERPL-SCNC: 104 MMOL/L (ref 95–110)
CHLORIDE SERPL-SCNC: 104 MMOL/L (ref 95–110)
CHLORIDE SERPL-SCNC: 105 MMOL/L (ref 95–110)
CHLORIDE SERPL-SCNC: 97 MMOL/L (ref 95–110)
CHLORIDE SERPL-SCNC: 97 MMOL/L (ref 95–110)
CHLORIDE SERPL-SCNC: 98 MMOL/L (ref 95–110)
CHOLEST SERPL-MCNC: 83 MG/DL (ref 120–199)
CHOLEST/HDLC SERPL: 3.3 {RATIO} (ref 2–5)
CLARITY UR: ABNORMAL
CLARITY UR: CLEAR
CO2 SERPL-SCNC: 19 MMOL/L (ref 23–29)
CO2 SERPL-SCNC: 20 MMOL/L (ref 23–29)
CO2 SERPL-SCNC: 21 MMOL/L (ref 23–29)
CO2 SERPL-SCNC: 23 MMOL/L (ref 23–29)
CO2 SERPL-SCNC: 23 MMOL/L (ref 23–29)
CO2 SERPL-SCNC: 24 MMOL/L (ref 23–29)
CO2 SERPL-SCNC: 25 MMOL/L (ref 23–29)
CO2 SERPL-SCNC: 26 MMOL/L (ref 23–29)
CO2 SERPL-SCNC: 28 MMOL/L (ref 23–29)
COLOR UR: YELLOW
COLOR UR: YELLOW
CREAT SERPL-MCNC: 4.4 MG/DL (ref 0.5–1.4)
CREAT SERPL-MCNC: 4.8 MG/DL (ref 0.5–1.4)
CREAT SERPL-MCNC: 5.1 MG/DL (ref 0.5–1.4)
CREAT SERPL-MCNC: 5.2 MG/DL (ref 0.5–1.4)
CREAT SERPL-MCNC: 5.3 MG/DL (ref 0.5–1.4)
CREAT SERPL-MCNC: 5.7 MG/DL (ref 0.5–1.4)
CREAT SERPL-MCNC: 5.7 MG/DL (ref 0.5–1.4)
CREAT SERPL-MCNC: 6.1 MG/DL (ref 0.5–1.4)
CREAT SERPL-MCNC: 6.2 MG/DL (ref 0.5–1.4)
CREAT SERPL-MCNC: 6.2 MG/DL (ref 0.5–1.4)
CREAT SERPL-MCNC: 6.3 MG/DL (ref 0.5–1.4)
CREAT SERPL-MCNC: 6.5 MG/DL (ref 0.5–1.4)
CREAT SERPL-MCNC: 6.6 MG/DL (ref 0.5–1.4)
CREAT SERPL-MCNC: 7.2 MG/DL (ref 0.5–1.4)
CREAT SERPL-MCNC: 7.4 MG/DL (ref 0.5–1.4)
CREAT SERPL-MCNC: 7.4 MG/DL (ref 0.5–1.4)
CV ECHO LV RWT: 0.48 CM
DIFFERENTIAL METHOD: ABNORMAL
DOP CALC AO PEAK VEL: 3.38 M/S
DOP CALC AO VTI: 66.26 CM
DOP CALC LVOT AREA: 3.2 CM2
DOP CALC LVOT DIAMETER: 2.02 CM
DOP CALC LVOT PEAK VEL: 1.01 M/S
DOP CALC LVOT STROKE VOLUME: 69.06 CM3
DOP CALCLVOT PEAK VEL VTI: 21.56 CM
E WAVE DECELERATION TIME: 299.9 MSEC
ECHO LV POSTERIOR WALL: 1.42 CM (ref 0.6–1.1)
EOSINOPHIL # BLD AUTO: 0.1 K/UL (ref 0–0.5)
EOSINOPHIL # BLD AUTO: 0.2 K/UL (ref 0–0.5)
EOSINOPHIL # BLD AUTO: 0.3 K/UL (ref 0–0.5)
EOSINOPHIL # BLD AUTO: 0.4 K/UL (ref 0–0.5)
EOSINOPHIL # BLD AUTO: 0.5 K/UL (ref 0–0.5)
EOSINOPHIL # BLD AUTO: 0.5 K/UL (ref 0–0.5)
EOSINOPHIL NFR BLD: 0.8 % (ref 0–8)
EOSINOPHIL NFR BLD: 1 % (ref 0–8)
EOSINOPHIL NFR BLD: 1 % (ref 0–8)
EOSINOPHIL NFR BLD: 2 % (ref 0–8)
EOSINOPHIL NFR BLD: 2.3 % (ref 0–8)
EOSINOPHIL NFR BLD: 2.5 % (ref 0–8)
EOSINOPHIL NFR BLD: 2.7 % (ref 0–8)
EOSINOPHIL NFR BLD: 2.9 % (ref 0–8)
EOSINOPHIL NFR BLD: 3 % (ref 0–8)
EOSINOPHIL NFR BLD: 3.1 % (ref 0–8)
EOSINOPHIL NFR BLD: 4.6 % (ref 0–8)
EOSINOPHIL NFR BLD: 4.7 % (ref 0–8)
EOSINOPHIL NFR BLD: 5 % (ref 0–8)
EOSINOPHIL NFR BLD: 5 % (ref 0–8)
EOSINOPHIL NFR BLD: 5.9 % (ref 0–8)
EOSINOPHIL NFR BLD: 6.1 % (ref 0–8)
ERYTHROCYTE [DISTWIDTH] IN BLOOD BY AUTOMATED COUNT: 12.6 % (ref 11.5–14.5)
ERYTHROCYTE [DISTWIDTH] IN BLOOD BY AUTOMATED COUNT: 12.7 % (ref 11.5–14.5)
ERYTHROCYTE [DISTWIDTH] IN BLOOD BY AUTOMATED COUNT: 12.8 % (ref 11.5–14.5)
ERYTHROCYTE [DISTWIDTH] IN BLOOD BY AUTOMATED COUNT: 13 % (ref 11.5–14.5)
ERYTHROCYTE [DISTWIDTH] IN BLOOD BY AUTOMATED COUNT: 13 % (ref 11.5–14.5)
ERYTHROCYTE [DISTWIDTH] IN BLOOD BY AUTOMATED COUNT: 13.1 % (ref 11.5–14.5)
ERYTHROCYTE [DISTWIDTH] IN BLOOD BY AUTOMATED COUNT: 13.1 % (ref 11.5–14.5)
ERYTHROCYTE [DISTWIDTH] IN BLOOD BY AUTOMATED COUNT: 13.3 % (ref 11.5–14.5)
ERYTHROCYTE [DISTWIDTH] IN BLOOD BY AUTOMATED COUNT: 13.5 % (ref 11.5–14.5)
ERYTHROCYTE [DISTWIDTH] IN BLOOD BY AUTOMATED COUNT: 13.6 % (ref 11.5–14.5)
ERYTHROCYTE [DISTWIDTH] IN BLOOD BY AUTOMATED COUNT: 13.7 % (ref 11.5–14.5)
ERYTHROCYTE [DISTWIDTH] IN BLOOD BY AUTOMATED COUNT: 13.8 % (ref 11.5–14.5)
ERYTHROCYTE [DISTWIDTH] IN BLOOD BY AUTOMATED COUNT: 14.8 % (ref 11.5–14.5)
EST. GFR  (AFRICAN AMERICAN): 10 ML/MIN/1.73 M^2
EST. GFR  (AFRICAN AMERICAN): 10 ML/MIN/1.73 M^2
EST. GFR  (AFRICAN AMERICAN): 11 ML/MIN/1.73 M^2
EST. GFR  (AFRICAN AMERICAN): 12 ML/MIN/1.73 M^2
EST. GFR  (AFRICAN AMERICAN): 14 ML/MIN/1.73 M^2
EST. GFR  (AFRICAN AMERICAN): 7 ML/MIN/1.73 M^2
EST. GFR  (AFRICAN AMERICAN): 7 ML/MIN/1.73 M^2
EST. GFR  (AFRICAN AMERICAN): 8 ML/MIN/1.73 M^2
EST. GFR  (AFRICAN AMERICAN): 8 ML/MIN/1.73 M^2
EST. GFR  (AFRICAN AMERICAN): 9 ML/MIN/1.73 M^2
EST. GFR  (NON AFRICAN AMERICAN): 10 ML/MIN/1.73 M^2
EST. GFR  (NON AFRICAN AMERICAN): 10 ML/MIN/1.73 M^2
EST. GFR  (NON AFRICAN AMERICAN): 11 ML/MIN/1.73 M^2
EST. GFR  (NON AFRICAN AMERICAN): 12 ML/MIN/1.73 M^2
EST. GFR  (NON AFRICAN AMERICAN): 6 ML/MIN/1.73 M^2
EST. GFR  (NON AFRICAN AMERICAN): 6 ML/MIN/1.73 M^2
EST. GFR  (NON AFRICAN AMERICAN): 7 ML/MIN/1.73 M^2
EST. GFR  (NON AFRICAN AMERICAN): 8 ML/MIN/1.73 M^2
EST. GFR  (NON AFRICAN AMERICAN): 9 ML/MIN/1.73 M^2
ESTIMATED AVG GLUCOSE: 100 MG/DL (ref 68–131)
FINAL PATHOLOGIC DIAGNOSIS: NORMAL
FINAL PATHOLOGIC DIAGNOSIS: NORMAL
FRACTIONAL SHORTENING: 17 % (ref 28–44)
GLUCOSE SERPL-MCNC: 106 MG/DL (ref 70–110)
GLUCOSE SERPL-MCNC: 107 MG/DL (ref 70–110)
GLUCOSE SERPL-MCNC: 114 MG/DL (ref 70–110)
GLUCOSE SERPL-MCNC: 123 MG/DL (ref 70–110)
GLUCOSE SERPL-MCNC: 123 MG/DL (ref 70–110)
GLUCOSE SERPL-MCNC: 135 MG/DL (ref 70–110)
GLUCOSE SERPL-MCNC: 137 MG/DL (ref 70–110)
GLUCOSE SERPL-MCNC: 141 MG/DL (ref 70–110)
GLUCOSE SERPL-MCNC: 78 MG/DL (ref 70–110)
GLUCOSE SERPL-MCNC: 79 MG/DL (ref 70–110)
GLUCOSE SERPL-MCNC: 82 MG/DL (ref 70–110)
GLUCOSE SERPL-MCNC: 86 MG/DL (ref 70–110)
GLUCOSE SERPL-MCNC: 87 MG/DL (ref 70–110)
GLUCOSE SERPL-MCNC: 96 MG/DL (ref 70–110)
GLUCOSE SERPL-MCNC: 97 MG/DL (ref 70–110)
GLUCOSE SERPL-MCNC: 99 MG/DL (ref 70–110)
GLUCOSE UR QL STRIP: NEGATIVE
GLUCOSE UR QL STRIP: NEGATIVE
GROSS: NORMAL
GROSS: NORMAL
HBA1C MFR BLD HPLC: 5.1 % (ref 4–5.6)
HBV CORE AB SERPL QL IA: POSITIVE
HBV SURFACE AB SER-ACNC: POSITIVE M[IU]/ML
HBV SURFACE AB SER-ACNC: POSITIVE M[IU]/ML
HBV SURFACE AG SERPL QL IA: NEGATIVE
HCT VFR BLD AUTO: 23.5 % (ref 40–54)
HCT VFR BLD AUTO: 24.7 % (ref 40–54)
HCT VFR BLD AUTO: 28.5 % (ref 40–54)
HCT VFR BLD AUTO: 28.7 % (ref 40–54)
HCT VFR BLD AUTO: 30.2 % (ref 40–54)
HCT VFR BLD AUTO: 30.2 % (ref 40–54)
HCT VFR BLD AUTO: 30.5 % (ref 40–54)
HCT VFR BLD AUTO: 30.7 % (ref 40–54)
HCT VFR BLD AUTO: 30.8 % (ref 40–54)
HCT VFR BLD AUTO: 31 % (ref 40–54)
HCT VFR BLD AUTO: 31.3 % (ref 40–54)
HCT VFR BLD AUTO: 31.4 % (ref 40–54)
HCT VFR BLD AUTO: 32.6 % (ref 40–54)
HCT VFR BLD AUTO: 33 % (ref 40–54)
HCT VFR BLD AUTO: 33.8 % (ref 40–54)
HCT VFR BLD AUTO: 36 % (ref 40–54)
HDLC SERPL-MCNC: 25 MG/DL (ref 40–75)
HDLC SERPL: 30.1 % (ref 20–50)
HGB BLD-MCNC: 10 G/DL (ref 14–18)
HGB BLD-MCNC: 10 G/DL (ref 14–18)
HGB BLD-MCNC: 10.1 G/DL (ref 14–18)
HGB BLD-MCNC: 10.7 G/DL (ref 14–18)
HGB BLD-MCNC: 11.1 G/DL (ref 14–18)
HGB BLD-MCNC: 11.2 G/DL (ref 14–18)
HGB BLD-MCNC: 11.8 G/DL (ref 14–18)
HGB BLD-MCNC: 7.5 G/DL (ref 14–18)
HGB BLD-MCNC: 7.8 G/DL (ref 14–18)
HGB BLD-MCNC: 9.4 G/DL (ref 14–18)
HGB BLD-MCNC: 9.6 G/DL (ref 14–18)
HGB BLD-MCNC: 9.8 G/DL (ref 14–18)
HGB BLD-MCNC: 9.8 G/DL (ref 14–18)
HGB BLD-MCNC: 9.9 G/DL (ref 14–18)
HGB UR QL STRIP: ABNORMAL
HGB UR QL STRIP: ABNORMAL
HYALINE CASTS #/AREA URNS LPF: 0 /LPF
HYALINE CASTS #/AREA URNS LPF: 1 /LPF
IMM GRANULOCYTES # BLD AUTO: 0.02 K/UL (ref 0–0.04)
IMM GRANULOCYTES # BLD AUTO: 0.03 K/UL (ref 0–0.04)
IMM GRANULOCYTES # BLD AUTO: 0.04 K/UL (ref 0–0.04)
IMM GRANULOCYTES # BLD AUTO: 0.05 K/UL (ref 0–0.04)
IMM GRANULOCYTES # BLD AUTO: 0.06 K/UL (ref 0–0.04)
IMM GRANULOCYTES # BLD AUTO: 0.07 K/UL (ref 0–0.04)
IMM GRANULOCYTES # BLD AUTO: 0.07 K/UL (ref 0–0.04)
IMM GRANULOCYTES # BLD AUTO: 0.09 K/UL (ref 0–0.04)
IMM GRANULOCYTES # BLD AUTO: 0.11 K/UL (ref 0–0.04)
IMM GRANULOCYTES NFR BLD AUTO: 0.3 % (ref 0–0.5)
IMM GRANULOCYTES NFR BLD AUTO: 0.4 % (ref 0–0.5)
IMM GRANULOCYTES NFR BLD AUTO: 0.5 % (ref 0–0.5)
IMM GRANULOCYTES NFR BLD AUTO: 0.5 % (ref 0–0.5)
IMM GRANULOCYTES NFR BLD AUTO: 0.6 % (ref 0–0.5)
IMM GRANULOCYTES NFR BLD AUTO: 0.7 % (ref 0–0.5)
IMM GRANULOCYTES NFR BLD AUTO: 0.7 % (ref 0–0.5)
IMM GRANULOCYTES NFR BLD AUTO: 0.8 % (ref 0–0.5)
IMM GRANULOCYTES NFR BLD AUTO: 0.8 % (ref 0–0.5)
IMM GRANULOCYTES NFR BLD AUTO: 1.3 % (ref 0–0.5)
INFLUENZA A, MOLECULAR: NEGATIVE
INFLUENZA B, MOLECULAR: NEGATIVE
INR PPP: 1 (ref 0.8–1.2)
INR PPP: 1.1 (ref 0.8–1.2)
INR PPP: 1.1 (ref 0.8–1.2)
INTERVENTRICULAR SEPTUM: 1.33 CM (ref 0.6–1.1)
KETONES UR QL STRIP: NEGATIVE
KETONES UR QL STRIP: NEGATIVE
LA MAJOR: 7.97 CM
LA MINOR: 6.93 CM
LA WIDTH: 4.95 CM
LACTATE SERPL-SCNC: 1.5 MMOL/L (ref 0.5–2.2)
LACTATE SERPL-SCNC: 1.9 MMOL/L (ref 0.5–2.2)
LACTATE SERPL-SCNC: 2.1 MMOL/L (ref 0.5–2.2)
LACTATE SERPL-SCNC: 2.2 MMOL/L (ref 0.5–2.2)
LACTATE SERPL-SCNC: 2.3 MMOL/L (ref 0.5–2.2)
LDLC SERPL CALC-MCNC: 46.6 MG/DL (ref 63–159)
LEFT ATRIUM SIZE: 4.69 CM
LEFT ATRIUM VOLUME INDEX: 68.8 ML/M2
LEFT ATRIUM VOLUME: 146.3 CM3
LEFT INTERNAL DIMENSION IN SYSTOLE: 4.92 CM (ref 2.1–4)
LEFT VENTRICLE DIASTOLIC VOLUME INDEX: 81.49 ML/M2
LEFT VENTRICLE DIASTOLIC VOLUME: 173.26 ML
LEFT VENTRICLE MASS INDEX: 173 G/M2
LEFT VENTRICLE SYSTOLIC VOLUME INDEX: 53.6 ML/M2
LEFT VENTRICLE SYSTOLIC VOLUME: 113.91 ML
LEFT VENTRICULAR INTERNAL DIMENSION IN DIASTOLE: 5.9 CM (ref 3.5–6)
LEFT VENTRICULAR MASS: 368.21 G
LEUKOCYTE ESTERASE UR QL STRIP: ABNORMAL
LEUKOCYTE ESTERASE UR QL STRIP: NEGATIVE
LEVETIRACETAM SERPL-MCNC: 4.8 UG/ML (ref 3–60)
LIPASE SERPL-CCNC: 112 U/L (ref 4–60)
LIPASE SERPL-CCNC: 160 U/L (ref 4–60)
LIPASE SERPL-CCNC: 81 U/L (ref 4–60)
LYMPHOCYTES # BLD AUTO: 0.6 K/UL (ref 1–4.8)
LYMPHOCYTES # BLD AUTO: 1.3 K/UL (ref 1–4.8)
LYMPHOCYTES # BLD AUTO: 1.4 K/UL (ref 1–4.8)
LYMPHOCYTES # BLD AUTO: 1.5 K/UL (ref 1–4.8)
LYMPHOCYTES # BLD AUTO: 1.6 K/UL (ref 1–4.8)
LYMPHOCYTES # BLD AUTO: 1.7 K/UL (ref 1–4.8)
LYMPHOCYTES # BLD AUTO: 1.8 K/UL (ref 1–4.8)
LYMPHOCYTES # BLD AUTO: 1.8 K/UL (ref 1–4.8)
LYMPHOCYTES # BLD AUTO: 1.9 K/UL (ref 1–4.8)
LYMPHOCYTES # BLD AUTO: 1.9 K/UL (ref 1–4.8)
LYMPHOCYTES # BLD AUTO: 2 K/UL (ref 1–4.8)
LYMPHOCYTES # BLD AUTO: 2.1 K/UL (ref 1–4.8)
LYMPHOCYTES # BLD AUTO: 2.1 K/UL (ref 1–4.8)
LYMPHOCYTES # BLD AUTO: 2.4 K/UL (ref 1–4.8)
LYMPHOCYTES NFR BLD: 17.8 % (ref 18–48)
LYMPHOCYTES NFR BLD: 18.2 % (ref 18–48)
LYMPHOCYTES NFR BLD: 18.2 % (ref 18–48)
LYMPHOCYTES NFR BLD: 18.4 % (ref 18–48)
LYMPHOCYTES NFR BLD: 18.7 % (ref 18–48)
LYMPHOCYTES NFR BLD: 19.1 % (ref 18–48)
LYMPHOCYTES NFR BLD: 20.3 % (ref 18–48)
LYMPHOCYTES NFR BLD: 20.9 % (ref 18–48)
LYMPHOCYTES NFR BLD: 21.1 % (ref 18–48)
LYMPHOCYTES NFR BLD: 21.9 % (ref 18–48)
LYMPHOCYTES NFR BLD: 22.8 % (ref 18–48)
LYMPHOCYTES NFR BLD: 23.6 % (ref 18–48)
LYMPHOCYTES NFR BLD: 23.8 % (ref 18–48)
LYMPHOCYTES NFR BLD: 24.8 % (ref 18–48)
LYMPHOCYTES NFR BLD: 25 % (ref 18–48)
LYMPHOCYTES NFR BLD: 4.2 % (ref 18–48)
MAGNESIUM SERPL-MCNC: 1.7 MG/DL (ref 1.6–2.6)
MAGNESIUM SERPL-MCNC: 1.9 MG/DL (ref 1.6–2.6)
MAGNESIUM SERPL-MCNC: 1.9 MG/DL (ref 1.6–2.6)
MAGNESIUM SERPL-MCNC: 2 MG/DL (ref 1.6–2.6)
MCH RBC QN AUTO: 31.4 PG (ref 27–31)
MCH RBC QN AUTO: 31.5 PG (ref 27–31)
MCH RBC QN AUTO: 31.8 PG (ref 27–31)
MCH RBC QN AUTO: 31.9 PG (ref 27–31)
MCH RBC QN AUTO: 32.1 PG (ref 27–31)
MCH RBC QN AUTO: 32.3 PG (ref 27–31)
MCH RBC QN AUTO: 32.5 PG (ref 27–31)
MCH RBC QN AUTO: 32.6 PG (ref 27–31)
MCH RBC QN AUTO: 32.7 PG (ref 27–31)
MCH RBC QN AUTO: 32.7 PG (ref 27–31)
MCH RBC QN AUTO: 33 PG (ref 27–31)
MCH RBC QN AUTO: 33.2 PG (ref 27–31)
MCH RBC QN AUTO: 33.5 PG (ref 27–31)
MCH RBC QN AUTO: 34.2 PG (ref 27–31)
MCHC RBC AUTO-ENTMCNC: 31.5 G/DL (ref 32–36)
MCHC RBC AUTO-ENTMCNC: 31.6 G/DL (ref 32–36)
MCHC RBC AUTO-ENTMCNC: 31.6 G/DL (ref 32–36)
MCHC RBC AUTO-ENTMCNC: 31.9 G/DL (ref 32–36)
MCHC RBC AUTO-ENTMCNC: 31.9 G/DL (ref 32–36)
MCHC RBC AUTO-ENTMCNC: 32.1 G/DL (ref 32–36)
MCHC RBC AUTO-ENTMCNC: 32.5 G/DL (ref 32–36)
MCHC RBC AUTO-ENTMCNC: 32.6 G/DL (ref 32–36)
MCHC RBC AUTO-ENTMCNC: 32.8 G/DL (ref 32–36)
MCHC RBC AUTO-ENTMCNC: 33.1 G/DL (ref 32–36)
MCHC RBC AUTO-ENTMCNC: 33.7 G/DL (ref 32–36)
MCHC RBC AUTO-ENTMCNC: 33.9 G/DL (ref 32–36)
MCV RBC AUTO: 100 FL (ref 82–98)
MCV RBC AUTO: 101 FL (ref 82–98)
MCV RBC AUTO: 102 FL (ref 82–98)
MCV RBC AUTO: 102 FL (ref 82–98)
MCV RBC AUTO: 103 FL (ref 82–98)
MCV RBC AUTO: 105 FL (ref 82–98)
MCV RBC AUTO: 97 FL (ref 82–98)
MCV RBC AUTO: 97 FL (ref 82–98)
MCV RBC AUTO: 98 FL (ref 82–98)
MCV RBC AUTO: 99 FL (ref 82–98)
MICROSCOPIC COMMENT: ABNORMAL
MICROSCOPIC COMMENT: ABNORMAL
MONOCYTES # BLD AUTO: 0.4 K/UL (ref 0.3–1)
MONOCYTES # BLD AUTO: 0.4 K/UL (ref 0.3–1)
MONOCYTES # BLD AUTO: 0.5 K/UL (ref 0.3–1)
MONOCYTES # BLD AUTO: 0.6 K/UL (ref 0.3–1)
MONOCYTES # BLD AUTO: 0.6 K/UL (ref 0.3–1)
MONOCYTES # BLD AUTO: 0.7 K/UL (ref 0.3–1)
MONOCYTES NFR BLD: 3.9 % (ref 4–15)
MONOCYTES NFR BLD: 4.8 % (ref 4–15)
MONOCYTES NFR BLD: 5 % (ref 4–15)
MONOCYTES NFR BLD: 5.5 % (ref 4–15)
MONOCYTES NFR BLD: 5.6 % (ref 4–15)
MONOCYTES NFR BLD: 6.3 % (ref 4–15)
MONOCYTES NFR BLD: 6.5 % (ref 4–15)
MONOCYTES NFR BLD: 6.6 % (ref 4–15)
MONOCYTES NFR BLD: 7.1 % (ref 4–15)
MONOCYTES NFR BLD: 7.1 % (ref 4–15)
MONOCYTES NFR BLD: 7.2 % (ref 4–15)
MONOCYTES NFR BLD: 7.3 % (ref 4–15)
MONOCYTES NFR BLD: 7.5 % (ref 4–15)
MONOCYTES NFR BLD: 7.5 % (ref 4–15)
MONOCYTES NFR BLD: 7.7 % (ref 4–15)
MONOCYTES NFR BLD: 7.9 % (ref 4–15)
MV STENOSIS PRESSURE HALF TIME: 53.42 MS
MV VALVE AREA P 1/2 METHOD: 4.12 CM2
NEUTROPHILS # BLD AUTO: 12.3 K/UL (ref 1.8–7.7)
NEUTROPHILS # BLD AUTO: 4.5 K/UL (ref 1.8–7.7)
NEUTROPHILS # BLD AUTO: 4.7 K/UL (ref 1.8–7.7)
NEUTROPHILS # BLD AUTO: 4.9 K/UL (ref 1.8–7.7)
NEUTROPHILS # BLD AUTO: 5 K/UL (ref 1.8–7.7)
NEUTROPHILS # BLD AUTO: 5.2 K/UL (ref 1.8–7.7)
NEUTROPHILS # BLD AUTO: 5.2 K/UL (ref 1.8–7.7)
NEUTROPHILS # BLD AUTO: 5.3 K/UL (ref 1.8–7.7)
NEUTROPHILS # BLD AUTO: 5.3 K/UL (ref 1.8–7.7)
NEUTROPHILS # BLD AUTO: 5.5 K/UL (ref 1.8–7.7)
NEUTROPHILS # BLD AUTO: 5.6 K/UL (ref 1.8–7.7)
NEUTROPHILS # BLD AUTO: 5.9 K/UL (ref 1.8–7.7)
NEUTROPHILS # BLD AUTO: 6 K/UL (ref 1.8–7.7)
NEUTROPHILS # BLD AUTO: 6.1 K/UL (ref 1.8–7.7)
NEUTROPHILS # BLD AUTO: 7.4 K/UL (ref 1.8–7.7)
NEUTROPHILS # BLD AUTO: 7.6 K/UL (ref 1.8–7.7)
NEUTROPHILS NFR BLD: 61.1 % (ref 38–73)
NEUTROPHILS NFR BLD: 64 % (ref 38–73)
NEUTROPHILS NFR BLD: 65.6 % (ref 38–73)
NEUTROPHILS NFR BLD: 65.6 % (ref 38–73)
NEUTROPHILS NFR BLD: 65.9 % (ref 38–73)
NEUTROPHILS NFR BLD: 66.6 % (ref 38–73)
NEUTROPHILS NFR BLD: 66.7 % (ref 38–73)
NEUTROPHILS NFR BLD: 68 % (ref 38–73)
NEUTROPHILS NFR BLD: 68.6 % (ref 38–73)
NEUTROPHILS NFR BLD: 68.8 % (ref 38–73)
NEUTROPHILS NFR BLD: 69.8 % (ref 38–73)
NEUTROPHILS NFR BLD: 71.7 % (ref 38–73)
NEUTROPHILS NFR BLD: 72.2 % (ref 38–73)
NEUTROPHILS NFR BLD: 72.4 % (ref 38–73)
NEUTROPHILS NFR BLD: 73.7 % (ref 38–73)
NEUTROPHILS NFR BLD: 90.3 % (ref 38–73)
NITRITE UR QL STRIP: NEGATIVE
NITRITE UR QL STRIP: NEGATIVE
NONHDLC SERPL-MCNC: 58 MG/DL
NRBC BLD-RTO: 0 /100 WBC
PH UR STRIP: 6 [PH] (ref 5–8)
PH UR STRIP: 6 [PH] (ref 5–8)
PHOSPHATE SERPL-MCNC: 3.8 MG/DL (ref 2.7–4.5)
PHOSPHATE SERPL-MCNC: 3.8 MG/DL (ref 2.7–4.5)
PHOSPHATE SERPL-MCNC: 4 MG/DL (ref 2.7–4.5)
PHOSPHATE SERPL-MCNC: 4.1 MG/DL (ref 2.7–4.5)
PHOSPHATE SERPL-MCNC: 4.2 MG/DL (ref 2.7–4.5)
PISA MRMAX VEL: 0.06 M/S
PISA RADIUS: 0.42 CM
PISA TR MAX VEL: 2.81 M/S
PISA TR VN NYQUIST: 0 M/S
PLATELET # BLD AUTO: 134 K/UL (ref 150–350)
PLATELET # BLD AUTO: 138 K/UL (ref 150–350)
PLATELET # BLD AUTO: 139 K/UL (ref 150–350)
PLATELET # BLD AUTO: 143 K/UL (ref 150–350)
PLATELET # BLD AUTO: 149 K/UL (ref 150–350)
PLATELET # BLD AUTO: 154 K/UL (ref 150–350)
PLATELET # BLD AUTO: 164 K/UL (ref 150–350)
PLATELET # BLD AUTO: 165 K/UL (ref 150–350)
PLATELET # BLD AUTO: 167 K/UL (ref 150–350)
PLATELET # BLD AUTO: 169 K/UL (ref 150–350)
PLATELET # BLD AUTO: 172 K/UL (ref 150–350)
PLATELET # BLD AUTO: 176 K/UL (ref 150–350)
PLATELET # BLD AUTO: 178 K/UL (ref 150–350)
PLATELET # BLD AUTO: 183 K/UL (ref 150–350)
PLATELET # BLD AUTO: 183 K/UL (ref 150–350)
PLATELET # BLD AUTO: 213 K/UL (ref 150–350)
PMV BLD AUTO: 10 FL (ref 9.2–12.9)
PMV BLD AUTO: 10 FL (ref 9.2–12.9)
PMV BLD AUTO: 9.2 FL (ref 9.2–12.9)
PMV BLD AUTO: 9.3 FL (ref 9.2–12.9)
PMV BLD AUTO: 9.3 FL (ref 9.2–12.9)
PMV BLD AUTO: 9.5 FL (ref 9.2–12.9)
PMV BLD AUTO: 9.6 FL (ref 9.2–12.9)
PMV BLD AUTO: 9.7 FL (ref 9.2–12.9)
PMV BLD AUTO: 9.8 FL (ref 9.2–12.9)
PMV BLD AUTO: 9.8 FL (ref 9.2–12.9)
POC PTINR: 1.3 (ref 0.9–1.2)
POC PTWBT: 15.7 SEC (ref 9.7–14.3)
POCT GLUCOSE: 100 MG/DL (ref 70–110)
POCT GLUCOSE: 103 MG/DL (ref 70–110)
POCT GLUCOSE: 106 MG/DL (ref 70–110)
POCT GLUCOSE: 111 MG/DL (ref 70–110)
POCT GLUCOSE: 117 MG/DL (ref 70–110)
POCT GLUCOSE: 127 MG/DL (ref 70–110)
POCT GLUCOSE: 137 MG/DL (ref 70–110)
POCT GLUCOSE: 148 MG/DL (ref 70–110)
POCT GLUCOSE: 162 MG/DL (ref 70–110)
POCT GLUCOSE: 72 MG/DL (ref 70–110)
POCT GLUCOSE: 74 MG/DL (ref 70–110)
POCT GLUCOSE: 76 MG/DL (ref 70–110)
POCT GLUCOSE: 79 MG/DL (ref 70–110)
POCT GLUCOSE: 81 MG/DL (ref 70–110)
POCT GLUCOSE: 84 MG/DL (ref 70–110)
POCT GLUCOSE: 85 MG/DL (ref 70–110)
POCT GLUCOSE: 87 MG/DL (ref 70–110)
POCT GLUCOSE: 91 MG/DL (ref 70–110)
POCT GLUCOSE: 94 MG/DL (ref 70–110)
POTASSIUM SERPL-SCNC: 4 MMOL/L (ref 3.5–5.1)
POTASSIUM SERPL-SCNC: 4.1 MMOL/L (ref 3.5–5.1)
POTASSIUM SERPL-SCNC: 4.2 MMOL/L (ref 3.5–5.1)
POTASSIUM SERPL-SCNC: 4.5 MMOL/L (ref 3.5–5.1)
POTASSIUM SERPL-SCNC: 4.5 MMOL/L (ref 3.5–5.1)
POTASSIUM SERPL-SCNC: 4.7 MMOL/L (ref 3.5–5.1)
POTASSIUM SERPL-SCNC: 4.8 MMOL/L (ref 3.5–5.1)
POTASSIUM SERPL-SCNC: 4.9 MMOL/L (ref 3.5–5.1)
POTASSIUM SERPL-SCNC: 5 MMOL/L (ref 3.5–5.1)
POTASSIUM SERPL-SCNC: 5.3 MMOL/L (ref 3.5–5.1)
POTASSIUM SERPL-SCNC: 6.1 MMOL/L (ref 3.5–5.1)
PROCALCITONIN SERPL IA-MCNC: 1.59 NG/ML
PROCALCITONIN SERPL IA-MCNC: 9.26 NG/ML
PROT SERPL-MCNC: 5.9 G/DL (ref 6–8.4)
PROT SERPL-MCNC: 5.9 G/DL (ref 6–8.4)
PROT SERPL-MCNC: 6.6 G/DL (ref 6–8.4)
PROT SERPL-MCNC: 7 G/DL (ref 6–8.4)
PROT SERPL-MCNC: 7.1 G/DL (ref 6–8.4)
PROT SERPL-MCNC: 7.1 G/DL (ref 6–8.4)
PROT SERPL-MCNC: 7.2 G/DL (ref 6–8.4)
PROT SERPL-MCNC: 7.3 G/DL (ref 6–8.4)
PROT SERPL-MCNC: 7.8 G/DL (ref 6–8.4)
PROT SERPL-MCNC: 8 G/DL (ref 6–8.4)
PROT UR QL STRIP: ABNORMAL
PROT UR QL STRIP: ABNORMAL
PROTHROMBIN TIME: 11.3 SEC (ref 9–12.5)
PROTHROMBIN TIME: 11.9 SEC (ref 9–12.5)
PROTHROMBIN TIME: 11.9 SEC (ref 9–12.5)
PV PEAK VELOCITY: 1.03 CM/S
RA MAJOR: 7.52 CM
RA PRESSURE: 3 MMHG
RA WIDTH: 5.12 CM
RBC # BLD AUTO: 2.3 M/UL (ref 4.6–6.2)
RBC # BLD AUTO: 2.35 M/UL (ref 4.6–6.2)
RBC # BLD AUTO: 2.81 M/UL (ref 4.6–6.2)
RBC # BLD AUTO: 2.91 M/UL (ref 4.6–6.2)
RBC # BLD AUTO: 3.02 M/UL (ref 4.6–6.2)
RBC # BLD AUTO: 3.05 M/UL (ref 4.6–6.2)
RBC # BLD AUTO: 3.06 M/UL (ref 4.6–6.2)
RBC # BLD AUTO: 3.06 M/UL (ref 4.6–6.2)
RBC # BLD AUTO: 3.11 M/UL (ref 4.6–6.2)
RBC # BLD AUTO: 3.12 M/UL (ref 4.6–6.2)
RBC # BLD AUTO: 3.13 M/UL (ref 4.6–6.2)
RBC # BLD AUTO: 3.14 M/UL (ref 4.6–6.2)
RBC # BLD AUTO: 3.33 M/UL (ref 4.6–6.2)
RBC # BLD AUTO: 3.42 M/UL (ref 4.6–6.2)
RBC # BLD AUTO: 3.42 M/UL (ref 4.6–6.2)
RBC # BLD AUTO: 3.52 M/UL (ref 4.6–6.2)
RBC #/AREA URNS HPF: 30 /HPF (ref 0–4)
RBC #/AREA URNS HPF: 75 /HPF (ref 0–4)
RIGHT VENTRICULAR END-DIASTOLIC DIMENSION: 2.9 CM
SAMPLE: ABNORMAL
SARS-COV-2 RDRP RESP QL NAA+PROBE: NEGATIVE
SARS-COV-2 RNA RESP QL NAA+PROBE: NOT DETECTED
SODIUM SERPL-SCNC: 134 MMOL/L (ref 136–145)
SODIUM SERPL-SCNC: 136 MMOL/L (ref 136–145)
SODIUM SERPL-SCNC: 137 MMOL/L (ref 136–145)
SODIUM SERPL-SCNC: 138 MMOL/L (ref 136–145)
SODIUM SERPL-SCNC: 139 MMOL/L (ref 136–145)
SODIUM SERPL-SCNC: 142 MMOL/L (ref 136–145)
SP GR UR STRIP: 1.02 (ref 1–1.03)
SP GR UR STRIP: 1.02 (ref 1–1.03)
SPECIMEN SOURCE: NORMAL
STJ: 3.94 CM
TDI LATERAL: 0.11 M/S
TDI SEPTAL: 0.04 M/S
TDI: 0.08 M/S
TR MAX PG: 32 MMHG
TRIGL SERPL-MCNC: 57 MG/DL (ref 30–150)
TSH SERPL DL<=0.005 MIU/L-ACNC: 1 UIU/ML (ref 0.4–4)
TV REST PULMONARY ARTERY PRESSURE: 35 MMHG
URN SPEC COLLECT METH UR: ABNORMAL
URN SPEC COLLECT METH UR: ABNORMAL
UROBILINOGEN UR STRIP-ACNC: NEGATIVE EU/DL
UROBILINOGEN UR STRIP-ACNC: NEGATIVE EU/DL
VANCOMYCIN SERPL-MCNC: 13.7 UG/ML
VANCOMYCIN SERPL-MCNC: 15.3 UG/ML
WBC # BLD AUTO: 10.19 K/UL (ref 3.9–12.7)
WBC # BLD AUTO: 10.59 K/UL (ref 3.9–12.7)
WBC # BLD AUTO: 13.65 K/UL (ref 3.9–12.7)
WBC # BLD AUTO: 6.84 K/UL (ref 3.9–12.7)
WBC # BLD AUTO: 7.02 K/UL (ref 3.9–12.7)
WBC # BLD AUTO: 7.3 K/UL (ref 3.9–12.7)
WBC # BLD AUTO: 7.34 K/UL (ref 3.9–12.7)
WBC # BLD AUTO: 7.37 K/UL (ref 3.9–12.7)
WBC # BLD AUTO: 7.38 K/UL (ref 3.9–12.7)
WBC # BLD AUTO: 7.55 K/UL (ref 3.9–12.7)
WBC # BLD AUTO: 7.62 K/UL (ref 3.9–12.7)
WBC # BLD AUTO: 8.35 K/UL (ref 3.9–12.7)
WBC # BLD AUTO: 8.53 K/UL (ref 3.9–12.7)
WBC # BLD AUTO: 8.78 K/UL (ref 3.9–12.7)
WBC # BLD AUTO: 9.08 K/UL (ref 3.9–12.7)
WBC # BLD AUTO: 9.47 K/UL (ref 3.9–12.7)
WBC #/AREA URNS HPF: 0 /HPF (ref 0–5)
WBC #/AREA URNS HPF: 25 /HPF (ref 0–5)
WBC CLUMPS URNS QL MICRO: ABNORMAL

## 2020-01-01 PROCEDURE — 85730 THROMBOPLASTIN TIME PARTIAL: CPT

## 2020-01-01 PROCEDURE — 83735 ASSAY OF MAGNESIUM: CPT

## 2020-01-01 PROCEDURE — 25000003 PHARM REV CODE 250: Performed by: INTERNAL MEDICINE

## 2020-01-01 PROCEDURE — 87502 INFLUENZA DNA AMP PROBE: CPT

## 2020-01-01 PROCEDURE — 84100 ASSAY OF PHOSPHORUS: CPT

## 2020-01-01 PROCEDURE — 99999 PR PBB SHADOW E&M-EST. PATIENT-LVL V: ICD-10-PCS | Mod: PBBFAC,,, | Performed by: FAMILY MEDICINE

## 2020-01-01 PROCEDURE — 94761 N-INVAS EAR/PLS OXIMETRY MLT: CPT

## 2020-01-01 PROCEDURE — 97110 THERAPEUTIC EXERCISES: CPT

## 2020-01-01 PROCEDURE — 63600175 PHARM REV CODE 636 W HCPCS: Performed by: INTERNAL MEDICINE

## 2020-01-01 PROCEDURE — 99900035 HC TECH TIME PER 15 MIN (STAT)

## 2020-01-01 PROCEDURE — 99214 PR OFFICE/OUTPT VISIT, EST, LEVL IV, 30-39 MIN: ICD-10-PCS | Mod: S$PBB,,, | Performed by: FAMILY MEDICINE

## 2020-01-01 PROCEDURE — 97803 MED NUTRITION INDIV SUBSEQ: CPT

## 2020-01-01 PROCEDURE — 37000009 HC ANESTHESIA EA ADD 15 MINS: Performed by: INTERNAL MEDICINE

## 2020-01-01 PROCEDURE — 36415 COLL VENOUS BLD VENIPUNCTURE: CPT

## 2020-01-01 PROCEDURE — 90935 HEMODIALYSIS ONE EVALUATION: CPT

## 2020-01-01 PROCEDURE — 99214 OFFICE O/P EST MOD 30 MIN: CPT | Mod: ,,, | Performed by: INTERNAL MEDICINE

## 2020-01-01 PROCEDURE — 63600175 PHARM REV CODE 636 W HCPCS: Performed by: NURSE ANESTHETIST, CERTIFIED REGISTERED

## 2020-01-01 PROCEDURE — 80053 COMPREHEN METABOLIC PANEL: CPT

## 2020-01-01 PROCEDURE — 25000003 PHARM REV CODE 250: Performed by: NURSE PRACTITIONER

## 2020-01-01 PROCEDURE — 25000003 PHARM REV CODE 250: Performed by: NURSE ANESTHETIST, CERTIFIED REGISTERED

## 2020-01-01 PROCEDURE — 85025 COMPLETE CBC W/AUTO DIFF WBC: CPT

## 2020-01-01 PROCEDURE — 27201018 HC KIT, PEG (ANY): Performed by: INTERNAL MEDICINE

## 2020-01-01 PROCEDURE — C9113 INJ PANTOPRAZOLE SODIUM, VIA: HCPCS | Performed by: HOSPITALIST

## 2020-01-01 PROCEDURE — 63600175 PHARM REV CODE 636 W HCPCS: Performed by: NURSE PRACTITIONER

## 2020-01-01 PROCEDURE — U0002 COVID-19 LAB TEST NON-CDC: HCPCS

## 2020-01-01 PROCEDURE — 92610 EVALUATE SWALLOWING FUNCTION: CPT

## 2020-01-01 PROCEDURE — 37000008 HC ANESTHESIA 1ST 15 MINUTES: Performed by: INTERNAL MEDICINE

## 2020-01-01 PROCEDURE — 25000242 PHARM REV CODE 250 ALT 637 W/ HCPCS: Performed by: EMERGENCY MEDICINE

## 2020-01-01 PROCEDURE — 25000242 PHARM REV CODE 250 ALT 637 W/ HCPCS: Performed by: NURSE PRACTITIONER

## 2020-01-01 PROCEDURE — 96374 THER/PROPH/DIAG INJ IV PUSH: CPT | Mod: 59

## 2020-01-01 PROCEDURE — G0257 UNSCHED DIALYSIS ESRD PT HOS: HCPCS

## 2020-01-01 PROCEDURE — 99999 PR PBB SHADOW E&M-EST. PATIENT-LVL V: ICD-10-PCS | Mod: PBBFAC,,, | Performed by: NURSE PRACTITIONER

## 2020-01-01 PROCEDURE — G0378 HOSPITAL OBSERVATION PER HR: HCPCS

## 2020-01-01 PROCEDURE — 43239 EGD BIOPSY SINGLE/MULTIPLE: CPT | Mod: ,,, | Performed by: INTERNAL MEDICINE

## 2020-01-01 PROCEDURE — 99223 PR INITIAL HOSPITAL CARE,LEVL III: ICD-10-PCS | Mod: ,,, | Performed by: INTERNAL MEDICINE

## 2020-01-01 PROCEDURE — 84443 ASSAY THYROID STIM HORMONE: CPT

## 2020-01-01 PROCEDURE — 25000003 PHARM REV CODE 250: Performed by: HOSPITALIST

## 2020-01-01 PROCEDURE — 99285 EMERGENCY DEPT VISIT HI MDM: CPT | Mod: 25

## 2020-01-01 PROCEDURE — 11000001 HC ACUTE MED/SURG PRIVATE ROOM

## 2020-01-01 PROCEDURE — 80177 DRUG SCRN QUAN LEVETIRACETAM: CPT

## 2020-01-01 PROCEDURE — 93005 ELECTROCARDIOGRAM TRACING: CPT

## 2020-01-01 PROCEDURE — 97535 SELF CARE MNGMENT TRAINING: CPT

## 2020-01-01 PROCEDURE — G0179 MD RECERTIFICATION HHA PT: HCPCS | Mod: ,,, | Performed by: FAMILY MEDICINE

## 2020-01-01 PROCEDURE — 63600175 PHARM REV CODE 636 W HCPCS: Performed by: EMERGENCY MEDICINE

## 2020-01-01 PROCEDURE — G0378 HOSPITAL OBSERVATION PER HR: HCPCS | Mod: CS

## 2020-01-01 PROCEDURE — 97530 THERAPEUTIC ACTIVITIES: CPT

## 2020-01-01 PROCEDURE — 99213 PR OFFICE/OUTPT VISIT, EST, LEVL III, 20-29 MIN: ICD-10-PCS | Mod: S$PBB,,, | Performed by: FAMILY MEDICINE

## 2020-01-01 PROCEDURE — 87340 HEPATITIS B SURFACE AG IA: CPT

## 2020-01-01 PROCEDURE — 83605 ASSAY OF LACTIC ACID: CPT | Mod: 91

## 2020-01-01 PROCEDURE — 87086 URINE CULTURE/COLONY COUNT: CPT

## 2020-01-01 PROCEDURE — 97116 GAIT TRAINING THERAPY: CPT

## 2020-01-01 PROCEDURE — 63600175 PHARM REV CODE 636 W HCPCS: Performed by: HOSPITALIST

## 2020-01-01 PROCEDURE — 94760 N-INVAS EAR/PLS OXIMETRY 1: CPT

## 2020-01-01 PROCEDURE — 99213 OFFICE O/P EST LOW 20 MIN: CPT | Mod: PBBFAC,PN | Performed by: INTERNAL MEDICINE

## 2020-01-01 PROCEDURE — 27201089 HC SNARE, DISP (ANY): Performed by: INTERNAL MEDICINE

## 2020-01-01 PROCEDURE — S0028 INJECTION, FAMOTIDINE, 20 MG: HCPCS | Performed by: INTERNAL MEDICINE

## 2020-01-01 PROCEDURE — 82565 ASSAY OF CREATININE: CPT

## 2020-01-01 PROCEDURE — 83605 ASSAY OF LACTIC ACID: CPT

## 2020-01-01 PROCEDURE — 82962 GLUCOSE BLOOD TEST: CPT

## 2020-01-01 PROCEDURE — 25000003 PHARM REV CODE 250: Performed by: EMERGENCY MEDICINE

## 2020-01-01 PROCEDURE — 99999 PR PBB SHADOW E&M-EST. PATIENT-LVL V: CPT | Mod: PBBFAC,,, | Performed by: FAMILY MEDICINE

## 2020-01-01 PROCEDURE — 96376 TX/PRO/DX INJ SAME DRUG ADON: CPT | Mod: 59

## 2020-01-01 PROCEDURE — 88342 IMHCHEM/IMCYTCHM 1ST ANTB: CPT | Mod: 59 | Performed by: PATHOLOGY

## 2020-01-01 PROCEDURE — 96365 THER/PROPH/DIAG IV INF INIT: CPT

## 2020-01-01 PROCEDURE — 45385 COLONOSCOPY W/LESION REMOVAL: CPT | Performed by: INTERNAL MEDICINE

## 2020-01-01 PROCEDURE — 93010 EKG 12-LEAD: ICD-10-PCS | Mod: ,,, | Performed by: SPECIALIST

## 2020-01-01 PROCEDURE — D9220A PRA ANESTHESIA: Mod: CRNA,,, | Performed by: NURSE ANESTHETIST, CERTIFIED REGISTERED

## 2020-01-01 PROCEDURE — 86901 BLOOD TYPING SEROLOGIC RH(D): CPT

## 2020-01-01 PROCEDURE — 97162 PT EVAL MOD COMPLEX 30 MIN: CPT

## 2020-01-01 PROCEDURE — 85610 PROTHROMBIN TIME: CPT

## 2020-01-01 PROCEDURE — 99214 PR OFFICE/OUTPT VISIT, EST, LEVL IV, 30-39 MIN: ICD-10-PCS | Mod: ,,, | Performed by: INTERNAL MEDICINE

## 2020-01-01 PROCEDURE — D9220A PRA ANESTHESIA: ICD-10-PCS | Mod: CRNA,,, | Performed by: NURSE ANESTHETIST, CERTIFIED REGISTERED

## 2020-01-01 PROCEDURE — 83690 ASSAY OF LIPASE: CPT

## 2020-01-01 PROCEDURE — 99215 OFFICE O/P EST HI 40 MIN: CPT | Mod: S$GLB,,, | Performed by: INTERNAL MEDICINE

## 2020-01-01 PROCEDURE — 88305 TISSUE EXAM BY PATHOLOGIST: CPT | Performed by: PATHOLOGY

## 2020-01-01 PROCEDURE — 93010 ELECTROCARDIOGRAM REPORT: CPT | Mod: ,,, | Performed by: SPECIALIST

## 2020-01-01 PROCEDURE — 80061 LIPID PANEL: CPT

## 2020-01-01 PROCEDURE — 80100014 HC HEMODIALYSIS 1:1

## 2020-01-01 PROCEDURE — 99214 PR OFFICE/OUTPT VISIT, EST, LEVL IV, 30-39 MIN: ICD-10-PCS | Mod: 95,,, | Performed by: PHYSICIAN ASSISTANT

## 2020-01-01 PROCEDURE — D9220A PRA ANESTHESIA: ICD-10-PCS | Mod: ANES,,, | Performed by: ANESTHESIOLOGY

## 2020-01-01 PROCEDURE — 43239 EGD BIOPSY SINGLE/MULTIPLE: CPT | Performed by: INTERNAL MEDICINE

## 2020-01-01 PROCEDURE — 99232 SBSQ HOSP IP/OBS MODERATE 35: CPT | Mod: ,,, | Performed by: INTERNAL MEDICINE

## 2020-01-01 PROCEDURE — 99215 PR OFFICE/OUTPT VISIT, EST, LEVL V, 40-54 MIN: ICD-10-PCS | Mod: S$GLB,,, | Performed by: INTERNAL MEDICINE

## 2020-01-01 PROCEDURE — 99495 TCM SERVICES (MODERATE COMPLEXITY): ICD-10-PCS | Mod: S$PBB,,, | Performed by: NURSE PRACTITIONER

## 2020-01-01 PROCEDURE — 20000000 HC ICU ROOM

## 2020-01-01 PROCEDURE — 99214 PR OFFICE/OUTPT VISIT, EST, LEVL IV, 30-39 MIN: ICD-10-PCS | Mod: S$PBB,,, | Performed by: INTERNAL MEDICINE

## 2020-01-01 PROCEDURE — 99215 OFFICE O/P EST HI 40 MIN: CPT | Mod: PBBFAC,PO | Performed by: FAMILY MEDICINE

## 2020-01-01 PROCEDURE — 88342 IMHCHEM/IMCYTCHM 1ST ANTB: CPT | Mod: 26,,, | Performed by: PATHOLOGY

## 2020-01-01 PROCEDURE — 99214 OFFICE O/P EST MOD 30 MIN: CPT | Mod: S$PBB,,, | Performed by: FAMILY MEDICINE

## 2020-01-01 PROCEDURE — G0179 PR HOME HEALTH MD RECERTIFICATION: ICD-10-PCS | Mod: ,,, | Performed by: FAMILY MEDICINE

## 2020-01-01 PROCEDURE — G0427 INPT/ED TELECONSULT70: HCPCS | Mod: 95,,, | Performed by: PSYCHIATRY & NEUROLOGY

## 2020-01-01 PROCEDURE — 99495 TRANSJ CARE MGMT MOD F2F 14D: CPT | Mod: S$PBB,,, | Performed by: NURSE PRACTITIONER

## 2020-01-01 PROCEDURE — 99283 EMERGENCY DEPT VISIT LOW MDM: CPT

## 2020-01-01 PROCEDURE — 96367 TX/PROPH/DG ADDL SEQ IV INF: CPT

## 2020-01-01 PROCEDURE — 99215 OFFICE O/P EST HI 40 MIN: CPT | Mod: PBBFAC,PO | Performed by: NURSE PRACTITIONER

## 2020-01-01 PROCEDURE — 96375 TX/PRO/DX INJ NEW DRUG ADDON: CPT

## 2020-01-01 PROCEDURE — 43246 PR EGD, FLEX, W/PLCMT, GASTROSTOMY TUBE: ICD-10-PCS | Mod: 22,,, | Performed by: INTERNAL MEDICINE

## 2020-01-01 PROCEDURE — 96374 THER/PROPH/DIAG INJ IV PUSH: CPT

## 2020-01-01 PROCEDURE — 97166 OT EVAL MOD COMPLEX 45 MIN: CPT

## 2020-01-01 PROCEDURE — 99291 CRITICAL CARE FIRST HOUR: CPT | Mod: 25

## 2020-01-01 PROCEDURE — 88305 TISSUE EXAM BY PATHOLOGIST: CPT | Mod: 26,,, | Performed by: PATHOLOGY

## 2020-01-01 PROCEDURE — 99223 1ST HOSP IP/OBS HIGH 75: CPT | Mod: ,,, | Performed by: INTERNAL MEDICINE

## 2020-01-01 PROCEDURE — 88305 TISSUE EXAM BY PATHOLOGIST: ICD-10-PCS | Mod: 26,,, | Performed by: PATHOLOGY

## 2020-01-01 PROCEDURE — 84145 PROCALCITONIN (PCT): CPT

## 2020-01-01 PROCEDURE — 83036 HEMOGLOBIN GLYCOSYLATED A1C: CPT

## 2020-01-01 PROCEDURE — 27201012 HC FORCEPS, HOT/COLD, DISP: Performed by: INTERNAL MEDICINE

## 2020-01-01 PROCEDURE — 86704 HEP B CORE ANTIBODY TOTAL: CPT

## 2020-01-01 PROCEDURE — 12000002 HC ACUTE/MED SURGE SEMI-PRIVATE ROOM

## 2020-01-01 PROCEDURE — 80048 BASIC METABOLIC PNL TOTAL CA: CPT

## 2020-01-01 PROCEDURE — 81000 URINALYSIS NONAUTO W/SCOPE: CPT

## 2020-01-01 PROCEDURE — 80069 RENAL FUNCTION PANEL: CPT

## 2020-01-01 PROCEDURE — G0427 PR INPT TELEHEALTH CON 70/>M: ICD-10-PCS | Mod: 95,,, | Performed by: PSYCHIATRY & NEUROLOGY

## 2020-01-01 PROCEDURE — 86706 HEP B SURFACE ANTIBODY: CPT

## 2020-01-01 PROCEDURE — 80202 ASSAY OF VANCOMYCIN: CPT

## 2020-01-01 PROCEDURE — 43239 PR EGD, FLEX, W/BIOPSY, SGL/MULTI: ICD-10-PCS | Mod: ,,, | Performed by: INTERNAL MEDICINE

## 2020-01-01 PROCEDURE — 45385 PR COLONOSCOPY,REMV LESN,SNARE: ICD-10-PCS | Mod: 22,,, | Performed by: INTERNAL MEDICINE

## 2020-01-01 PROCEDURE — 87040 BLOOD CULTURE FOR BACTERIA: CPT

## 2020-01-01 PROCEDURE — 96376 TX/PRO/DX INJ SAME DRUG ADON: CPT

## 2020-01-01 PROCEDURE — 97802 MEDICAL NUTRITION INDIV IN: CPT

## 2020-01-01 PROCEDURE — D9220A PRA ANESTHESIA: Mod: ANES,,, | Performed by: ANESTHESIOLOGY

## 2020-01-01 PROCEDURE — 99999 PR PBB SHADOW E&M-EST. PATIENT-LVL III: CPT | Mod: PBBFAC,,, | Performed by: INTERNAL MEDICINE

## 2020-01-01 PROCEDURE — 99999 PR PBB SHADOW E&M-EST. PATIENT-LVL III: ICD-10-PCS | Mod: PBBFAC,,, | Performed by: INTERNAL MEDICINE

## 2020-01-01 PROCEDURE — 99213 OFFICE O/P EST LOW 20 MIN: CPT | Mod: S$PBB,,, | Performed by: FAMILY MEDICINE

## 2020-01-01 PROCEDURE — 99214 OFFICE O/P EST MOD 30 MIN: CPT | Mod: 95,,, | Performed by: PHYSICIAN ASSISTANT

## 2020-01-01 PROCEDURE — U0003 INFECTIOUS AGENT DETECTION BY NUCLEIC ACID (DNA OR RNA); SEVERE ACUTE RESPIRATORY SYNDROME CORONAVIRUS 2 (SARS-COV-2) (CORONAVIRUS DISEASE [COVID-19]), AMPLIFIED PROBE TECHNIQUE, MAKING USE OF HIGH THROUGHPUT TECHNOLOGIES AS DESCRIBED BY CMS-2020-01-R: HCPCS

## 2020-01-01 PROCEDURE — 43246 EGD PLACE GASTROSTOMY TUBE: CPT | Performed by: INTERNAL MEDICINE

## 2020-01-01 PROCEDURE — 43246 EGD PLACE GASTROSTOMY TUBE: CPT | Mod: 22,,, | Performed by: INTERNAL MEDICINE

## 2020-01-01 PROCEDURE — 99999 PR PBB SHADOW E&M-EST. PATIENT-LVL V: CPT | Mod: PBBFAC,,, | Performed by: NURSE PRACTITIONER

## 2020-01-01 PROCEDURE — 99232 PR SUBSEQUENT HOSPITAL CARE,LEVL II: ICD-10-PCS | Mod: ,,, | Performed by: INTERNAL MEDICINE

## 2020-01-01 PROCEDURE — C9113 INJ PANTOPRAZOLE SODIUM, VIA: HCPCS | Performed by: EMERGENCY MEDICINE

## 2020-01-01 PROCEDURE — 99214 OFFICE O/P EST MOD 30 MIN: CPT | Mod: S$PBB,,, | Performed by: INTERNAL MEDICINE

## 2020-01-01 PROCEDURE — 71045 X-RAY EXAM CHEST 1 VIEW: CPT | Mod: TC

## 2020-01-01 PROCEDURE — 45385 COLONOSCOPY W/LESION REMOVAL: CPT | Mod: 22,,, | Performed by: INTERNAL MEDICINE

## 2020-01-01 PROCEDURE — 88342 CHG IMMUNOCYTOCHEMISTRY: ICD-10-PCS | Mod: 26,,, | Performed by: PATHOLOGY

## 2020-01-01 PROCEDURE — 97165 OT EVAL LOW COMPLEX 30 MIN: CPT

## 2020-01-01 PROCEDURE — 94640 AIRWAY INHALATION TREATMENT: CPT

## 2020-01-01 PROCEDURE — 85025 COMPLETE CBC W/AUTO DIFF WBC: CPT | Mod: 91

## 2020-01-01 PROCEDURE — 88305 TISSUE EXAM BY PATHOLOGIST: CPT | Mod: 59 | Performed by: PATHOLOGY

## 2020-01-01 RX ORDER — KETOROLAC TROMETHAMINE 30 MG/ML
15 INJECTION, SOLUTION INTRAMUSCULAR; INTRAVENOUS ONCE
Status: DISCONTINUED | OUTPATIENT
Start: 2020-01-01 | End: 2020-01-01 | Stop reason: SDUPTHER

## 2020-01-01 RX ORDER — CEPHALEXIN 500 MG/1
500 CAPSULE ORAL EVERY 8 HOURS
Qty: 15 CAPSULE | Refills: 0 | Status: SHIPPED | OUTPATIENT
Start: 2020-01-01 | End: 2020-01-01

## 2020-01-01 RX ORDER — EPINEPHRINE 0.1 MG/ML
INJECTION INTRAVENOUS
Status: DISCONTINUED
Start: 2020-01-01 | End: 2020-01-01 | Stop reason: WASHOUT

## 2020-01-01 RX ORDER — ALBUTEROL SULFATE 2.5 MG/.5ML
10 SOLUTION RESPIRATORY (INHALATION)
Status: COMPLETED | OUTPATIENT
Start: 2020-01-01 | End: 2020-01-01

## 2020-01-01 RX ORDER — SODIUM CHLORIDE 9 MG/ML
INJECTION, SOLUTION INTRAVENOUS
Status: CANCELLED | OUTPATIENT
Start: 2020-01-01

## 2020-01-01 RX ORDER — FUROSEMIDE 40 MG/1
1 TABLET ORAL
Status: ON HOLD | COMMUNITY
End: 2020-01-01 | Stop reason: HOSPADM

## 2020-01-01 RX ORDER — SODIUM CHLORIDE 9 MG/ML
INJECTION, SOLUTION INTRAVENOUS CONTINUOUS
Status: DISCONTINUED | OUTPATIENT
Start: 2020-01-01 | End: 2020-01-01 | Stop reason: HOSPADM

## 2020-01-01 RX ORDER — ATORVASTATIN CALCIUM 40 MG/1
40 TABLET, FILM COATED ORAL DAILY
Status: DISCONTINUED | OUTPATIENT
Start: 2020-01-01 | End: 2020-01-01 | Stop reason: HOSPADM

## 2020-01-01 RX ORDER — MUPIROCIN 20 MG/G
OINTMENT TOPICAL 2 TIMES DAILY
Status: DISCONTINUED | OUTPATIENT
Start: 2020-01-01 | End: 2020-01-01 | Stop reason: HOSPADM

## 2020-01-01 RX ORDER — VIT B COMP NO.3/FOLIC/C/BIOTIN 1 MG-60 MG
1 TABLET ORAL DAILY
Qty: 90 TABLET | Refills: 3 | Status: SHIPPED | OUTPATIENT
Start: 2020-01-01

## 2020-01-01 RX ORDER — LIDOCAINE HCL/PF 100 MG/5ML
SYRINGE (ML) INTRAVENOUS
Status: DISCONTINUED | OUTPATIENT
Start: 2020-01-01 | End: 2020-01-01

## 2020-01-01 RX ORDER — ASPIRIN 81 MG/1
81 TABLET ORAL DAILY
Status: DISCONTINUED | OUTPATIENT
Start: 2020-01-01 | End: 2020-01-01 | Stop reason: HOSPADM

## 2020-01-01 RX ORDER — GABAPENTIN 100 MG/1
100 CAPSULE ORAL 2 TIMES DAILY
Status: DISCONTINUED | OUTPATIENT
Start: 2020-01-01 | End: 2020-01-01 | Stop reason: HOSPADM

## 2020-01-01 RX ORDER — SODIUM CHLORIDE 9 MG/ML
INJECTION, SOLUTION INTRAVENOUS ONCE
Status: CANCELLED | OUTPATIENT
Start: 2020-01-01 | End: 2020-01-01

## 2020-01-01 RX ORDER — BACITRACIN 500 [USP'U]/G
OINTMENT TOPICAL
Status: ON HOLD | COMMUNITY
End: 2020-01-01 | Stop reason: HOSPADM

## 2020-01-01 RX ORDER — ONDANSETRON 2 MG/ML
8 INJECTION INTRAMUSCULAR; INTRAVENOUS EVERY 6 HOURS PRN
Status: DISCONTINUED | OUTPATIENT
Start: 2020-01-01 | End: 2020-01-01 | Stop reason: HOSPADM

## 2020-01-01 RX ORDER — ACETAMINOPHEN 325 MG/1
650 TABLET ORAL EVERY 4 HOURS PRN
Status: DISCONTINUED | OUTPATIENT
Start: 2020-01-01 | End: 2020-01-01 | Stop reason: HOSPADM

## 2020-01-01 RX ORDER — VANCOMYCIN HYDROCHLORIDE 500 MG/100ML
500 INJECTION, SOLUTION INTRAVENOUS ONCE
Status: COMPLETED | OUTPATIENT
Start: 2020-01-01 | End: 2020-01-01

## 2020-01-01 RX ORDER — CARVEDILOL 6.25 MG/1
TABLET ORAL
Status: ON HOLD | COMMUNITY
Start: 2020-01-01 | End: 2020-01-01 | Stop reason: SDUPTHER

## 2020-01-01 RX ORDER — FAMOTIDINE 10 MG/ML
20 INJECTION INTRAVENOUS DAILY
Status: DISCONTINUED | OUTPATIENT
Start: 2020-01-01 | End: 2020-01-01 | Stop reason: HOSPADM

## 2020-01-01 RX ORDER — ONDANSETRON 2 MG/ML
4 INJECTION INTRAMUSCULAR; INTRAVENOUS EVERY 12 HOURS PRN
Status: DISCONTINUED | OUTPATIENT
Start: 2020-01-01 | End: 2020-01-01 | Stop reason: HOSPADM

## 2020-01-01 RX ORDER — CARVEDILOL 12.5 MG/1
78.12 TABLET ORAL 2 TIMES DAILY
Qty: 90 TABLET | Refills: 4 | Status: SHIPPED | OUTPATIENT
Start: 2020-01-01

## 2020-01-01 RX ORDER — SERTRALINE HYDROCHLORIDE 25 MG/1
25 TABLET, FILM COATED ORAL NIGHTLY
Status: DISCONTINUED | OUTPATIENT
Start: 2020-01-01 | End: 2020-01-01

## 2020-01-01 RX ORDER — FAMOTIDINE 20 MG/1
20 TABLET, FILM COATED ORAL DAILY
Status: DISCONTINUED | OUTPATIENT
Start: 2020-01-01 | End: 2020-01-01 | Stop reason: HOSPADM

## 2020-01-01 RX ORDER — ACETAMINOPHEN 325 MG/1
650 TABLET ORAL
Status: COMPLETED | OUTPATIENT
Start: 2020-01-01 | End: 2020-01-01

## 2020-01-01 RX ORDER — ASPIRIN 81 MG/1
81 TABLET ORAL DAILY
COMMUNITY

## 2020-01-01 RX ORDER — LEVETIRACETAM 100 MG/ML
7.5 SOLUTION ORAL DAILY
Status: DISCONTINUED | OUTPATIENT
Start: 2020-01-01 | End: 2020-01-01 | Stop reason: HOSPADM

## 2020-01-01 RX ORDER — CARVEDILOL 12.5 MG/1
6.25 TABLET ORAL 2 TIMES DAILY
COMMUNITY
End: 2020-01-01 | Stop reason: SDUPTHER

## 2020-01-01 RX ORDER — HYDROCODONE BITARTRATE AND ACETAMINOPHEN 5; 325 MG/1; MG/1
1 TABLET ORAL EVERY 6 HOURS PRN
Status: DISCONTINUED | OUTPATIENT
Start: 2020-01-01 | End: 2020-01-01 | Stop reason: HOSPADM

## 2020-01-01 RX ORDER — CIPROFLOXACIN 500 MG/1
TABLET ORAL
Qty: 10 TABLET | Refills: 0 | Status: SHIPPED | OUTPATIENT
Start: 2020-01-01 | End: 2021-01-01

## 2020-01-01 RX ORDER — SODIUM CHLORIDE 9 MG/ML
INJECTION, SOLUTION INTRAVENOUS
Status: DISCONTINUED | OUTPATIENT
Start: 2020-01-01 | End: 2020-01-01 | Stop reason: HOSPADM

## 2020-01-01 RX ORDER — FAMOTIDINE 20 MG/1
20 TABLET, FILM COATED ORAL 2 TIMES DAILY
Status: DISCONTINUED | OUTPATIENT
Start: 2020-01-01 | End: 2020-01-01

## 2020-01-01 RX ORDER — KETOROLAC TROMETHAMINE 30 MG/ML
15 INJECTION, SOLUTION INTRAMUSCULAR; INTRAVENOUS ONCE
Status: COMPLETED | OUTPATIENT
Start: 2020-01-01 | End: 2020-01-01

## 2020-01-01 RX ORDER — AMOXICILLIN 250 MG
1 CAPSULE ORAL DAILY
Qty: 30 TABLET | Refills: 11 | Status: SHIPPED | OUTPATIENT
Start: 2020-01-01 | End: 2021-09-25

## 2020-01-01 RX ORDER — PHENYLEPHRINE HYDROCHLORIDE 10 MG/ML
INJECTION INTRAVENOUS
Status: DISCONTINUED | OUTPATIENT
Start: 2020-01-01 | End: 2020-01-01

## 2020-01-01 RX ORDER — LIDOCAINE 50 MG/G
1 PATCH TOPICAL
Status: DISCONTINUED | OUTPATIENT
Start: 2020-01-01 | End: 2020-01-01 | Stop reason: HOSPADM

## 2020-01-01 RX ORDER — SODIUM CHLORIDE 9 MG/ML
INJECTION, SOLUTION INTRAVENOUS ONCE
Status: DISCONTINUED | OUTPATIENT
Start: 2020-01-01 | End: 2020-01-01

## 2020-01-01 RX ORDER — SODIUM CHLORIDE 0.9 % (FLUSH) 0.9 %
10 SYRINGE (ML) INJECTION
Status: DISCONTINUED | OUTPATIENT
Start: 2020-01-01 | End: 2020-01-01 | Stop reason: HOSPADM

## 2020-01-01 RX ORDER — DEXTROMETHORPHAN/PSEUDOEPHED 2.5-7.5/.8
DROPS ORAL
Status: DISCONTINUED
Start: 2020-01-01 | End: 2020-01-01 | Stop reason: WASHOUT

## 2020-01-01 RX ORDER — PANTOPRAZOLE SODIUM 40 MG/10ML
40 INJECTION, POWDER, LYOPHILIZED, FOR SOLUTION INTRAVENOUS 2 TIMES DAILY
Status: DISCONTINUED | OUTPATIENT
Start: 2020-01-01 | End: 2020-01-01 | Stop reason: HOSPADM

## 2020-01-01 RX ORDER — PANTOPRAZOLE SODIUM 40 MG/10ML
80 INJECTION, POWDER, LYOPHILIZED, FOR SOLUTION INTRAVENOUS
Status: COMPLETED | OUTPATIENT
Start: 2020-01-01 | End: 2020-01-01

## 2020-01-01 RX ORDER — LANOLIN ALCOHOL/MO/W.PET/CERES
800 CREAM (GRAM) TOPICAL
Status: DISCONTINUED | OUTPATIENT
Start: 2020-01-01 | End: 2020-01-01 | Stop reason: HOSPADM

## 2020-01-01 RX ORDER — AMOXICILLIN 250 MG
1 CAPSULE ORAL DAILY
Status: DISCONTINUED | OUTPATIENT
Start: 2020-01-01 | End: 2020-01-01 | Stop reason: HOSPADM

## 2020-01-01 RX ORDER — CYCLOBENZAPRINE HCL 10 MG
10 TABLET ORAL 3 TIMES DAILY PRN
Qty: 15 TABLET | Refills: 0 | Status: SHIPPED | OUTPATIENT
Start: 2020-01-01 | End: 2020-01-01

## 2020-01-01 RX ORDER — LEVETIRACETAM 100 MG/ML
SOLUTION ORAL
Qty: 120 ML | Refills: 5 | Status: SHIPPED | OUTPATIENT
Start: 2020-01-01 | End: 2020-01-01 | Stop reason: SDUPTHER

## 2020-01-01 RX ORDER — ALBUTEROL SULFATE 90 UG/1
AEROSOL, METERED RESPIRATORY (INHALATION)
Status: ON HOLD
Start: 2020-01-01 | End: 2020-01-01

## 2020-01-01 RX ORDER — MORPHINE SULFATE 4 MG/ML
3 INJECTION, SOLUTION INTRAMUSCULAR; INTRAVENOUS EVERY 4 HOURS PRN
Status: DISCONTINUED | OUTPATIENT
Start: 2020-01-01 | End: 2020-01-01 | Stop reason: HOSPADM

## 2020-01-01 RX ORDER — LEVETIRACETAM 100 MG/ML
750 SOLUTION ORAL DAILY
Status: DISCONTINUED | OUTPATIENT
Start: 2020-01-01 | End: 2020-01-01 | Stop reason: HOSPADM

## 2020-01-01 RX ORDER — LIDOCAINE 50 MG/G
1 PATCH TOPICAL
Status: DISCONTINUED | OUTPATIENT
Start: 2020-01-01 | End: 2020-01-01

## 2020-01-01 RX ORDER — ACETAMINOPHEN 325 MG/1
650 TABLET ORAL EVERY 6 HOURS PRN
Status: DISCONTINUED | OUTPATIENT
Start: 2020-01-01 | End: 2020-01-01 | Stop reason: HOSPADM

## 2020-01-01 RX ORDER — MUPIROCIN 20 MG/G
OINTMENT TOPICAL 3 TIMES DAILY
Qty: 1 TUBE | Refills: 2 | Status: ON HOLD | OUTPATIENT
Start: 2020-01-01 | End: 2020-01-01

## 2020-01-01 RX ORDER — NAPROXEN SODIUM 220 MG/1
81 TABLET, FILM COATED ORAL DAILY
Status: DISCONTINUED | OUTPATIENT
Start: 2020-01-01 | End: 2020-01-01 | Stop reason: HOSPADM

## 2020-01-01 RX ORDER — FAMOTIDINE 10 MG/ML
20 INJECTION INTRAVENOUS EVERY 12 HOURS
Status: DISCONTINUED | OUTPATIENT
Start: 2020-01-01 | End: 2020-01-01

## 2020-01-01 RX ORDER — LABETALOL HYDROCHLORIDE 5 MG/ML
10 INJECTION, SOLUTION INTRAVENOUS EVERY 6 HOURS PRN
Status: DISCONTINUED | OUTPATIENT
Start: 2020-01-01 | End: 2020-01-01 | Stop reason: HOSPADM

## 2020-01-01 RX ORDER — FINASTERIDE 5 MG/1
5 TABLET, FILM COATED ORAL DAILY
Status: DISCONTINUED | OUTPATIENT
Start: 2020-01-01 | End: 2020-01-01 | Stop reason: HOSPADM

## 2020-01-01 RX ORDER — INSULIN ASPART 100 [IU]/ML
0-5 INJECTION, SOLUTION INTRAVENOUS; SUBCUTANEOUS
Status: DISCONTINUED | OUTPATIENT
Start: 2020-01-01 | End: 2020-01-01 | Stop reason: HOSPADM

## 2020-01-01 RX ORDER — ONDANSETRON 2 MG/ML
4 INJECTION INTRAMUSCULAR; INTRAVENOUS EVERY 6 HOURS PRN
Status: DISCONTINUED | OUTPATIENT
Start: 2020-01-01 | End: 2020-01-01 | Stop reason: HOSPADM

## 2020-01-01 RX ORDER — AMOXICILLIN AND CLAVULANATE POTASSIUM 500; 125 MG/1; MG/1
1 TABLET, FILM COATED ORAL 2 TIMES DAILY
Qty: 14 TABLET | Refills: 0 | Status: SHIPPED | OUTPATIENT
Start: 2020-01-01 | End: 2020-01-01

## 2020-01-01 RX ORDER — CALCIUM GLUCONATE 20 MG/ML
1 INJECTION, SOLUTION INTRAVENOUS
Status: COMPLETED | OUTPATIENT
Start: 2020-01-01 | End: 2020-01-01

## 2020-01-01 RX ORDER — POLYETHYLENE GLYCOL 3350, SODIUM CHLORIDE, SODIUM BICARBONATE, POTASSIUM CHLORIDE 420; 11.2; 5.72; 1.48 G/4L; G/4L; G/4L; G/4L
4000 POWDER, FOR SOLUTION ORAL ONCE
Status: COMPLETED | OUTPATIENT
Start: 2020-01-01 | End: 2020-01-01

## 2020-01-01 RX ORDER — FLUTICASONE PROPIONATE 50 MCG
2 SPRAY, SUSPENSION (ML) NASAL DAILY
Status: DISCONTINUED | OUTPATIENT
Start: 2020-01-01 | End: 2020-01-01 | Stop reason: HOSPADM

## 2020-01-01 RX ORDER — GLUCAGON 1 MG
1 KIT INJECTION
Status: DISCONTINUED | OUTPATIENT
Start: 2020-01-01 | End: 2020-01-01 | Stop reason: HOSPADM

## 2020-01-01 RX ORDER — LIDOCAINE 50 MG/G
1 PATCH TOPICAL DAILY
Qty: 5 PATCH | Refills: 0 | Status: SHIPPED | OUTPATIENT
Start: 2020-01-01 | End: 2020-01-01

## 2020-01-01 RX ORDER — IBUPROFEN 200 MG
16 TABLET ORAL
Status: DISCONTINUED | OUTPATIENT
Start: 2020-01-01 | End: 2020-01-01 | Stop reason: HOSPADM

## 2020-01-01 RX ORDER — SERTRALINE HYDROCHLORIDE 25 MG/1
25 TABLET, FILM COATED ORAL NIGHTLY
Qty: 30 TABLET | Refills: 3 | Status: SHIPPED | OUTPATIENT
Start: 2020-01-01 | End: 2020-01-01

## 2020-01-01 RX ORDER — ACETAMINOPHEN 10 MG/ML
500 INJECTION, SOLUTION INTRAVENOUS ONCE
Status: COMPLETED | OUTPATIENT
Start: 2020-01-01 | End: 2020-01-01

## 2020-01-01 RX ORDER — TAMSULOSIN HYDROCHLORIDE 0.4 MG/1
1 CAPSULE ORAL DAILY
Status: DISCONTINUED | OUTPATIENT
Start: 2020-01-01 | End: 2020-01-01 | Stop reason: HOSPADM

## 2020-01-01 RX ORDER — PROPOFOL 10 MG/ML
INJECTION, EMULSION INTRAVENOUS
Status: DISCONTINUED | OUTPATIENT
Start: 2020-01-01 | End: 2020-01-01

## 2020-01-01 RX ORDER — CARVEDILOL 6.25 MG/1
12.5 TABLET ORAL DAILY
Status: DISCONTINUED | OUTPATIENT
Start: 2020-01-01 | End: 2020-01-01

## 2020-01-01 RX ORDER — SODIUM CHLORIDE 0.9 % (FLUSH) 0.9 %
10 SYRINGE (ML) INJECTION
Status: DISCONTINUED | OUTPATIENT
Start: 2020-01-01 | End: 2020-01-01

## 2020-01-01 RX ORDER — ALLOPURINOL 100 MG/1
100 TABLET ORAL DAILY
Status: DISCONTINUED | OUTPATIENT
Start: 2020-01-01 | End: 2020-01-01 | Stop reason: HOSPADM

## 2020-01-01 RX ORDER — CARVEDILOL 6.25 MG/1
12.5 TABLET ORAL DAILY
Status: DISCONTINUED | OUTPATIENT
Start: 2020-01-01 | End: 2020-01-01 | Stop reason: HOSPADM

## 2020-01-01 RX ORDER — FUROSEMIDE 40 MG/1
TABLET ORAL
Qty: 16 TABLET | Refills: 11 | Status: ON HOLD | OUTPATIENT
Start: 2020-01-01 | End: 2020-01-01

## 2020-01-01 RX ORDER — LEVETIRACETAM 100 MG/ML
500 SOLUTION ORAL
Status: DISCONTINUED | OUTPATIENT
Start: 2020-01-01 | End: 2020-01-01 | Stop reason: HOSPADM

## 2020-01-01 RX ORDER — SODIUM CHLORIDE 9 MG/ML
INJECTION, SOLUTION INTRAVENOUS ONCE
Status: DISCONTINUED | OUTPATIENT
Start: 2020-01-01 | End: 2020-01-01 | Stop reason: HOSPADM

## 2020-01-01 RX ORDER — ATORVASTATIN CALCIUM 40 MG/1
40 TABLET, FILM COATED ORAL DAILY
Qty: 90 TABLET | Refills: 4 | Status: ON HOLD | OUTPATIENT
Start: 2020-01-01 | End: 2021-01-01 | Stop reason: SDUPTHER

## 2020-01-01 RX ORDER — CEPHALEXIN 250 MG/1
500 CAPSULE ORAL EVERY 8 HOURS
Status: DISCONTINUED | OUTPATIENT
Start: 2020-01-01 | End: 2020-01-01 | Stop reason: HOSPADM

## 2020-01-01 RX ORDER — PROPOFOL 10 MG/ML
VIAL (ML) INTRAVENOUS
Status: DISCONTINUED | OUTPATIENT
Start: 2020-01-01 | End: 2020-01-01

## 2020-01-01 RX ORDER — ATORVASTATIN CALCIUM 40 MG/1
TABLET, FILM COATED ORAL
COMMUNITY
Start: 2020-01-01 | End: 2020-01-01 | Stop reason: SDUPTHER

## 2020-01-01 RX ORDER — ATORVASTATIN CALCIUM 40 MG/1
40 TABLET, FILM COATED ORAL DAILY
Status: ON HOLD | COMMUNITY
End: 2020-01-01 | Stop reason: SDUPTHER

## 2020-01-01 RX ORDER — ONDANSETRON 2 MG/ML
8 INJECTION INTRAMUSCULAR; INTRAVENOUS EVERY 8 HOURS PRN
Status: DISCONTINUED | OUTPATIENT
Start: 2020-01-01 | End: 2020-01-01 | Stop reason: HOSPADM

## 2020-01-01 RX ORDER — MORPHINE SULFATE 0.5 MG/ML
1 INJECTION, SOLUTION EPIDURAL; INTRATHECAL; INTRAVENOUS ONCE
Status: DISCONTINUED | OUTPATIENT
Start: 2020-01-01 | End: 2020-01-01

## 2020-01-01 RX ORDER — AMOXICILLIN 250 MG
1 CAPSULE ORAL DAILY
COMMUNITY
Start: 2020-03-17 | End: 2020-01-01 | Stop reason: SDUPTHER

## 2020-01-01 RX ORDER — MORPHINE SULFATE 2 MG/ML
2 INJECTION, SOLUTION INTRAMUSCULAR; INTRAVENOUS
Status: COMPLETED | OUTPATIENT
Start: 2020-01-01 | End: 2020-01-01

## 2020-01-01 RX ORDER — IPRATROPIUM BROMIDE AND ALBUTEROL SULFATE 2.5; .5 MG/3ML; MG/3ML
3 SOLUTION RESPIRATORY (INHALATION) EVERY 6 HOURS PRN
Status: DISCONTINUED | OUTPATIENT
Start: 2020-01-01 | End: 2020-01-01 | Stop reason: HOSPADM

## 2020-01-01 RX ORDER — ALLOPURINOL 100 MG/1
100 TABLET ORAL DAILY
COMMUNITY

## 2020-01-01 RX ORDER — IBUPROFEN 200 MG
24 TABLET ORAL
Status: DISCONTINUED | OUTPATIENT
Start: 2020-01-01 | End: 2020-01-01 | Stop reason: HOSPADM

## 2020-01-01 RX ORDER — TALC
6 POWDER (GRAM) TOPICAL NIGHTLY PRN
Status: DISCONTINUED | OUTPATIENT
Start: 2020-01-01 | End: 2020-01-01 | Stop reason: HOSPADM

## 2020-01-01 RX ORDER — CYCLOBENZAPRINE HCL 5 MG
10 TABLET ORAL 3 TIMES DAILY PRN
Status: DISCONTINUED | OUTPATIENT
Start: 2020-01-01 | End: 2020-01-01 | Stop reason: HOSPADM

## 2020-01-01 RX ORDER — MORPHINE SULFATE 2 MG/ML
1 INJECTION, SOLUTION INTRAMUSCULAR; INTRAVENOUS ONCE
Status: COMPLETED | OUTPATIENT
Start: 2020-01-01 | End: 2020-01-01

## 2020-01-01 RX ORDER — GABAPENTIN 100 MG/1
100 CAPSULE ORAL 2 TIMES DAILY
Status: DISCONTINUED | OUTPATIENT
Start: 2020-01-01 | End: 2020-01-01

## 2020-01-01 RX ORDER — BUSPIRONE HYDROCHLORIDE 5 MG/1
5 TABLET ORAL 3 TIMES DAILY PRN
Qty: 90 TABLET | Refills: 0 | Status: SHIPPED | OUTPATIENT
Start: 2020-01-01 | End: 2020-01-01

## 2020-01-01 RX ADMIN — PROPOFOL 70 MG: 10 INJECTION, EMULSION INTRAVENOUS at 11:11

## 2020-01-01 RX ADMIN — POLYETHYLENE GLYCOL-3350, SODIUM CHLORIDE, POTASSIUM CHLORIDE AND SODIUM BICARBONATE 4000 ML: 420; 11.2; 5.72; 1.48 POWDER, FOR SOLUTION ORAL at 06:08

## 2020-01-01 RX ADMIN — ASPIRIN 81 MG: 81 TABLET, COATED ORAL at 09:08

## 2020-01-01 RX ADMIN — LIDOCAINE HYDROCHLORIDE 100 MG: 20 INJECTION INTRAVENOUS at 11:11

## 2020-01-01 RX ADMIN — PROPOFOL 30 MG: 10 INJECTION, EMULSION INTRAVENOUS at 12:08

## 2020-01-01 RX ADMIN — ASPIRIN 81 MG: 81 TABLET, COATED ORAL at 08:09

## 2020-01-01 RX ADMIN — KETOROLAC TROMETHAMINE 15 MG: 30 INJECTION, SOLUTION INTRAMUSCULAR; INTRAVENOUS at 08:09

## 2020-01-01 RX ADMIN — ACETAMINOPHEN 650 MG: 325 TABLET ORAL at 11:08

## 2020-01-01 RX ADMIN — VANCOMYCIN HYDROCHLORIDE 500 MG: 500 INJECTION, SOLUTION INTRAVENOUS at 08:08

## 2020-01-01 RX ADMIN — DEXTROSE MONOHYDRATE 750 MG: 5 INJECTION, SOLUTION INTRAVENOUS at 08:08

## 2020-01-01 RX ADMIN — PIPERACILLIN SODIUM AND TAZOBACTAM SODIUM 4.5 G: 4; .5 INJECTION, POWDER, LYOPHILIZED, FOR SOLUTION INTRAVENOUS at 12:08

## 2020-01-01 RX ADMIN — PROPOFOL 20 MG: 10 INJECTION, EMULSION INTRAVENOUS at 12:08

## 2020-01-01 RX ADMIN — BACITRACIN ZINC NEOMYCIN SULFATE POLYMYXIN B SULFATE: 400; 3.5; 5 OINTMENT TOPICAL at 09:11

## 2020-01-01 RX ADMIN — VANCOMYCIN HYDROCHLORIDE 1250 MG: 1.25 INJECTION, POWDER, LYOPHILIZED, FOR SOLUTION INTRAVENOUS at 12:08

## 2020-01-01 RX ADMIN — ASPIRIN 81 MG CHEWABLE TABLET 81 MG: 81 TABLET CHEWABLE at 08:11

## 2020-01-01 RX ADMIN — CEPHALEXIN 500 MG: 250 CAPSULE ORAL at 09:11

## 2020-01-01 RX ADMIN — MORPHINE SULFATE 3 MG: 4 INJECTION INTRAVENOUS at 05:09

## 2020-01-01 RX ADMIN — GABAPENTIN 100 MG: 100 CAPSULE ORAL at 10:09

## 2020-01-01 RX ADMIN — ATORVASTATIN CALCIUM 40 MG: 40 TABLET, FILM COATED ORAL at 02:08

## 2020-01-01 RX ADMIN — NEPHROCAP 1 CAPSULE: 1 CAP ORAL at 09:08

## 2020-01-01 RX ADMIN — DEXTROSE MONOHYDRATE 750 MG: 5 INJECTION, SOLUTION INTRAVENOUS at 09:08

## 2020-01-01 RX ADMIN — PANTOPRAZOLE SODIUM 40 MG: 40 INJECTION, POWDER, LYOPHILIZED, FOR SOLUTION INTRAVENOUS at 09:08

## 2020-01-01 RX ADMIN — CEFTRIAXONE SODIUM 1 G: 1 INJECTION, POWDER, FOR SOLUTION INTRAMUSCULAR; INTRAVENOUS at 10:11

## 2020-01-01 RX ADMIN — PIPERACILLIN SODIUM AND TAZOBACTAM SODIUM 4.5 G: 4; .5 INJECTION, POWDER, LYOPHILIZED, FOR SOLUTION INTRAVENOUS at 02:08

## 2020-01-01 RX ADMIN — PANTOPRAZOLE SODIUM 40 MG: 40 INJECTION, POWDER, LYOPHILIZED, FOR SOLUTION INTRAVENOUS at 08:08

## 2020-01-01 RX ADMIN — BACITRACIN ZINC NEOMYCIN SULFATE POLYMYXIN B SULFATE: 400; 3.5; 5 OINTMENT TOPICAL at 08:11

## 2020-01-01 RX ADMIN — LIDOCAINE 1 PATCH: 50 PATCH TOPICAL at 10:09

## 2020-01-01 RX ADMIN — CEFTRIAXONE SODIUM 1 G: 1 INJECTION, POWDER, FOR SOLUTION INTRAMUSCULAR; INTRAVENOUS at 11:11

## 2020-01-01 RX ADMIN — MUPIROCIN: 20 OINTMENT TOPICAL at 08:11

## 2020-01-01 RX ADMIN — APIXABAN 2.5 MG: 2.5 TABLET, FILM COATED ORAL at 07:08

## 2020-01-01 RX ADMIN — MUPIROCIN: 20 OINTMENT TOPICAL at 09:11

## 2020-01-01 RX ADMIN — FAMOTIDINE 20 MG: 20 TABLET ORAL at 08:11

## 2020-01-01 RX ADMIN — PHENYLEPHRINE HYDROCHLORIDE 300 MCG: 10 INJECTION INTRAVENOUS at 12:08

## 2020-01-01 RX ADMIN — PROPOFOL 80 MG: 10 INJECTION, EMULSION INTRAVENOUS at 12:08

## 2020-01-01 RX ADMIN — MUPIROCIN: 20 OINTMENT TOPICAL at 10:09

## 2020-01-01 RX ADMIN — LIDOCAINE HYDROCHLORIDE 80 MG: 20 INJECTION INTRAVENOUS at 12:08

## 2020-01-01 RX ADMIN — ALLOPURINOL 100 MG: 100 TABLET ORAL at 10:08

## 2020-01-01 RX ADMIN — DOCUSATE SODIUM - SENNOSIDES 1 TABLET: 50; 8.6 TABLET, FILM COATED ORAL at 08:11

## 2020-01-01 RX ADMIN — FINASTERIDE 5 MG: 5 TABLET, FILM COATED ORAL at 09:08

## 2020-01-01 RX ADMIN — MORPHINE SULFATE 3 MG: 4 INJECTION INTRAVENOUS at 01:09

## 2020-01-01 RX ADMIN — FINASTERIDE 5 MG: 5 TABLET, FILM COATED ORAL at 10:08

## 2020-01-01 RX ADMIN — ALLOPURINOL 100 MG: 100 TABLET ORAL at 08:09

## 2020-01-01 RX ADMIN — FAMOTIDINE 20 MG: 10 INJECTION INTRAVENOUS at 09:08

## 2020-01-01 RX ADMIN — EPOETIN ALFA-EPBX 10000 UNITS: 10000 INJECTION, SOLUTION INTRAVENOUS; SUBCUTANEOUS at 07:08

## 2020-01-01 RX ADMIN — ATORVASTATIN CALCIUM 40 MG: 40 TABLET, FILM COATED ORAL at 09:08

## 2020-01-01 RX ADMIN — MORPHINE SULFATE 3 MG: 4 INJECTION INTRAVENOUS at 08:09

## 2020-01-01 RX ADMIN — APIXABAN 2.5 MG: 2.5 TABLET, FILM COATED ORAL at 09:08

## 2020-01-01 RX ADMIN — PHENYLEPHRINE HYDROCHLORIDE 100 MCG: 10 INJECTION INTRAVENOUS at 12:08

## 2020-01-01 RX ADMIN — APIXABAN 2.5 MG: 2.5 TABLET, FILM COATED ORAL at 08:09

## 2020-01-01 RX ADMIN — SODIUM CHLORIDE 1000 ML: 0.9 INJECTION, SOLUTION INTRAVENOUS at 02:08

## 2020-01-01 RX ADMIN — FLUTICASONE PROPIONATE 100 MCG: 50 SPRAY, METERED NASAL at 10:08

## 2020-01-01 RX ADMIN — ATORVASTATIN CALCIUM 40 MG: 40 TABLET, FILM COATED ORAL at 08:09

## 2020-01-01 RX ADMIN — PANTOPRAZOLE SODIUM 40 MG: 40 INJECTION, POWDER, LYOPHILIZED, FOR SOLUTION INTRAVENOUS at 10:08

## 2020-01-01 RX ADMIN — FLUTICASONE PROPIONATE 100 MCG: 50 SPRAY, METERED NASAL at 09:08

## 2020-01-01 RX ADMIN — SODIUM CHLORIDE: 0.9 INJECTION, SOLUTION INTRAVENOUS at 09:11

## 2020-01-01 RX ADMIN — ATORVASTATIN CALCIUM 40 MG: 40 TABLET, FILM COATED ORAL at 09:11

## 2020-01-01 RX ADMIN — PROPOFOL 20 MG: 10 INJECTION, EMULSION INTRAVENOUS at 11:11

## 2020-01-01 RX ADMIN — APIXABAN 2.5 MG: 2.5 TABLET, FILM COATED ORAL at 10:09

## 2020-01-01 RX ADMIN — NEPHROCAP 1 CAPSULE: 1 CAP ORAL at 10:08

## 2020-01-01 RX ADMIN — PROPOFOL 50 MG: 10 INJECTION, EMULSION INTRAVENOUS at 11:08

## 2020-01-01 RX ADMIN — LEVETIRACETAM 750 MG: 100 SOLUTION ORAL at 10:08

## 2020-01-01 RX ADMIN — APIXABAN 2.5 MG: 2.5 TABLET, FILM COATED ORAL at 08:08

## 2020-01-01 RX ADMIN — CEPHALEXIN 500 MG: 250 CAPSULE ORAL at 01:11

## 2020-01-01 RX ADMIN — VANCOMYCIN HYDROCHLORIDE 500 MG: 500 INJECTION, POWDER, LYOPHILIZED, FOR SOLUTION INTRAVENOUS at 10:08

## 2020-01-01 RX ADMIN — PANTOPRAZOLE SODIUM 40 MG: 40 INJECTION, POWDER, LYOPHILIZED, FOR SOLUTION INTRAVENOUS at 06:08

## 2020-01-01 RX ADMIN — CARVEDILOL 12.5 MG: 6.25 TABLET, FILM COATED ORAL at 08:09

## 2020-01-01 RX ADMIN — APIXABAN 2.5 MG: 2.5 TABLET, FILM COATED ORAL at 09:11

## 2020-01-01 RX ADMIN — ALLOPURINOL 100 MG: 100 TABLET ORAL at 08:11

## 2020-01-01 RX ADMIN — MUPIROCIN: 20 OINTMENT TOPICAL at 08:09

## 2020-01-01 RX ADMIN — LIDOCAINE HYDROCHLORIDE 100 MG: 20 INJECTION INTRAVENOUS at 11:08

## 2020-01-01 RX ADMIN — PANTOPRAZOLE SODIUM 80 MG: 40 INJECTION, POWDER, LYOPHILIZED, FOR SOLUTION INTRAVENOUS at 10:08

## 2020-01-01 RX ADMIN — ASPIRIN 81 MG: 81 TABLET, COATED ORAL at 10:08

## 2020-01-01 RX ADMIN — APIXABAN 2.5 MG: 2.5 TABLET, FILM COATED ORAL at 08:11

## 2020-01-01 RX ADMIN — PIPERACILLIN SODIUM AND TAZOBACTAM SODIUM 4.5 G: 4; .5 INJECTION, POWDER, LYOPHILIZED, FOR SOLUTION INTRAVENOUS at 01:08

## 2020-01-01 RX ADMIN — Medication 6 MG: at 12:11

## 2020-01-01 RX ADMIN — TAMSULOSIN HYDROCHLORIDE 0.4 MG: 0.4 CAPSULE ORAL at 09:08

## 2020-01-01 RX ADMIN — MUPIROCIN: 20 OINTMENT TOPICAL at 09:09

## 2020-01-01 RX ADMIN — LEVETIRACETAM 750 MG: 100 SOLUTION ORAL at 08:09

## 2020-01-01 RX ADMIN — MAGNESIUM CITRATE: 1.75 LIQUID ORAL at 01:09

## 2020-01-01 RX ADMIN — ASPIRIN 81 MG CHEWABLE TABLET 81 MG: 81 TABLET CHEWABLE at 09:11

## 2020-01-01 RX ADMIN — ONDANSETRON 8 MG: 2 INJECTION INTRAMUSCULAR; INTRAVENOUS at 08:09

## 2020-01-01 RX ADMIN — LEVETIRACETAM 500 MG: 100 SOLUTION ORAL at 01:11

## 2020-01-01 RX ADMIN — EPOETIN ALFA-EPBX 10000 UNITS: 10000 INJECTION, SOLUTION INTRAVENOUS; SUBCUTANEOUS at 06:08

## 2020-01-01 RX ADMIN — PANTOPRAZOLE SODIUM 40 MG: 40 INJECTION, POWDER, LYOPHILIZED, FOR SOLUTION INTRAVENOUS at 07:08

## 2020-01-01 RX ADMIN — MORPHINE SULFATE 3 MG: 4 INJECTION INTRAVENOUS at 03:09

## 2020-01-01 RX ADMIN — FAMOTIDINE 20 MG: 20 TABLET ORAL at 09:11

## 2020-01-01 RX ADMIN — PROPOFOL 10 MG: 10 INJECTION, EMULSION INTRAVENOUS at 11:11

## 2020-01-01 RX ADMIN — TAMSULOSIN HYDROCHLORIDE 0.4 MG: 0.4 CAPSULE ORAL at 10:08

## 2020-01-01 RX ADMIN — LEVETIRACETAM 750 MG: 100 SOLUTION ORAL at 09:08

## 2020-01-01 RX ADMIN — ACETAMINOPHEN 650 MG: 325 TABLET ORAL at 10:11

## 2020-01-01 RX ADMIN — CEPHALEXIN 500 MG: 250 CAPSULE ORAL at 05:11

## 2020-01-01 RX ADMIN — MORPHINE SULFATE 2 MG: 2 INJECTION, SOLUTION INTRAMUSCULAR; INTRAVENOUS at 02:09

## 2020-01-01 RX ADMIN — LEVETIRACETAM 750 MG: 100 SOLUTION ORAL at 09:11

## 2020-01-01 RX ADMIN — LEVETIRACETAM 750 MG: 100 SOLUTION ORAL at 08:11

## 2020-01-01 RX ADMIN — INSULIN HUMAN 5 UNITS: 100 INJECTION, SOLUTION PARENTERAL at 01:08

## 2020-01-01 RX ADMIN — FAMOTIDINE 20 MG: 10 INJECTION INTRAVENOUS at 10:08

## 2020-01-01 RX ADMIN — ALBUTEROL SULFATE 10 MG: 2.5 SOLUTION RESPIRATORY (INHALATION) at 01:08

## 2020-01-01 RX ADMIN — ACETAMINOPHEN 500 MG: 10 INJECTION, SOLUTION INTRAVENOUS at 12:11

## 2020-01-01 RX ADMIN — EPOETIN ALFA-EPBX 10000 UNITS: 10000 INJECTION, SOLUTION INTRAVENOUS; SUBCUTANEOUS at 04:08

## 2020-01-01 RX ADMIN — HYDROCODONE BITARTRATE AND ACETAMINOPHEN 1 TABLET: 5; 325 TABLET ORAL at 08:09

## 2020-01-01 RX ADMIN — DEXTROSE MONOHYDRATE 25 G: 25 INJECTION, SOLUTION INTRAVENOUS at 01:08

## 2020-01-01 RX ADMIN — APIXABAN 2.5 MG: 2.5 TABLET, FILM COATED ORAL at 10:08

## 2020-01-01 RX ADMIN — DOCUSATE SODIUM - SENNOSIDES 1 TABLET: 50; 8.6 TABLET, FILM COATED ORAL at 09:11

## 2020-01-01 RX ADMIN — ATORVASTATIN CALCIUM 40 MG: 40 TABLET, FILM COATED ORAL at 10:08

## 2020-01-01 RX ADMIN — ALLOPURINOL 100 MG: 100 TABLET ORAL at 09:08

## 2020-01-01 RX ADMIN — CALCIUM GLUCONATE 1000 MG: 20 INJECTION, SOLUTION INTRAVENOUS at 01:08

## 2020-01-01 RX ADMIN — GABAPENTIN 100 MG: 100 CAPSULE ORAL at 08:09

## 2020-01-01 RX ADMIN — SODIUM CHLORIDE 500 ML: 0.9 INJECTION, SOLUTION INTRAVENOUS at 12:08

## 2020-01-16 ENCOUNTER — TELEPHONE (OUTPATIENT)
Dept: FAMILY MEDICINE | Facility: CLINIC | Age: 80
End: 2020-01-16

## 2020-01-27 ENCOUNTER — OFFICE VISIT (OUTPATIENT)
Dept: FAMILY MEDICINE | Facility: CLINIC | Age: 80
End: 2020-01-27
Payer: MEDICARE

## 2020-01-27 ENCOUNTER — TELEPHONE (OUTPATIENT)
Dept: REHABILITATION | Facility: HOSPITAL | Age: 80
End: 2020-01-27

## 2020-01-27 ENCOUNTER — CLINICAL SUPPORT (OUTPATIENT)
Dept: REHABILITATION | Facility: HOSPITAL | Age: 80
End: 2020-01-27
Payer: MEDICARE

## 2020-01-27 VITALS
TEMPERATURE: 98 F | OXYGEN SATURATION: 97 % | HEIGHT: 73 IN | WEIGHT: 218.94 LBS | HEART RATE: 67 BPM | BODY MASS INDEX: 29.02 KG/M2 | SYSTOLIC BLOOD PRESSURE: 136 MMHG | DIASTOLIC BLOOD PRESSURE: 70 MMHG

## 2020-01-27 DIAGNOSIS — R13.12 OROPHARYNGEAL DYSPHAGIA: Primary | ICD-10-CM

## 2020-01-27 DIAGNOSIS — I10 ESSENTIAL HYPERTENSION: Primary | ICD-10-CM

## 2020-01-27 DIAGNOSIS — E66.01 MORBID (SEVERE) OBESITY DUE TO EXCESS CALORIES: ICD-10-CM

## 2020-01-27 DIAGNOSIS — R41.841 COGNITIVE COMMUNICATION DISORDER: ICD-10-CM

## 2020-01-27 DIAGNOSIS — Z99.2 DEPENDENCE ON RENAL DIALYSIS: ICD-10-CM

## 2020-01-27 DIAGNOSIS — I48.19 PERSISTENT ATRIAL FIBRILLATION: ICD-10-CM

## 2020-01-27 DIAGNOSIS — K64.8 INTERNAL HEMORRHOIDS: ICD-10-CM

## 2020-01-27 DIAGNOSIS — S60.512A ABRASION OF LEFT HAND, INITIAL ENCOUNTER: ICD-10-CM

## 2020-01-27 DIAGNOSIS — I69.391 DYSPHAGIA DUE TO OLD STROKE: ICD-10-CM

## 2020-01-27 DIAGNOSIS — N18.5 CRF (CHRONIC RENAL FAILURE), STAGE 5: ICD-10-CM

## 2020-01-27 PROBLEM — R13.10 DYSPHAGIA: Status: ACTIVE | Noted: 2020-01-27

## 2020-01-27 PROBLEM — J44.89 COPD WITH ASTHMA: Status: ACTIVE | Noted: 2020-01-27

## 2020-01-27 PROBLEM — I69.351 HEMIPLEGIA AND HEMIPARESIS FOLLOWING CEREBRAL INFARCTION AFFECTING RIGHT DOMINANT SIDE: Status: ACTIVE | Noted: 2020-01-27

## 2020-01-27 PROCEDURE — 96125 COGNITIVE TEST BY HC PRO: CPT | Mod: PN

## 2020-01-27 PROCEDURE — 99214 PR OFFICE/OUTPT VISIT, EST, LEVL IV, 30-39 MIN: ICD-10-PCS | Mod: S$PBB,,, | Performed by: NURSE PRACTITIONER

## 2020-01-27 PROCEDURE — 99214 OFFICE O/P EST MOD 30 MIN: CPT | Mod: S$PBB,,, | Performed by: NURSE PRACTITIONER

## 2020-01-27 PROCEDURE — 92610 EVALUATE SWALLOWING FUNCTION: CPT | Mod: PN

## 2020-01-27 PROCEDURE — 99999 PR PBB SHADOW E&M-EST. PATIENT-LVL III: CPT | Mod: PBBFAC,,, | Performed by: NURSE PRACTITIONER

## 2020-01-27 PROCEDURE — 99213 OFFICE O/P EST LOW 20 MIN: CPT | Mod: PBBFAC,PO | Performed by: NURSE PRACTITIONER

## 2020-01-27 PROCEDURE — 99999 PR PBB SHADOW E&M-EST. PATIENT-LVL III: ICD-10-PCS | Mod: PBBFAC,,, | Performed by: NURSE PRACTITIONER

## 2020-01-27 RX ORDER — FAMOTIDINE 10 MG/1
10 TABLET ORAL
COMMUNITY
Start: 2020-01-14 | End: 2020-01-01 | Stop reason: ALTCHOICE

## 2020-01-27 RX ORDER — HYDROCORTISONE ACETATE, PRAMOXINE HCL 2.5; 1 G/100G; G/100G
CREAM TOPICAL 3 TIMES DAILY
Qty: 57 G | Refills: 3 | Status: ON HOLD | OUTPATIENT
Start: 2020-01-27 | End: 2020-01-01

## 2020-01-27 RX ORDER — MUPIROCIN 20 MG/G
OINTMENT TOPICAL 3 TIMES DAILY
Qty: 1 TUBE | Refills: 2 | Status: SHIPPED | OUTPATIENT
Start: 2020-01-27 | End: 2020-01-01 | Stop reason: SDUPTHER

## 2020-01-27 RX ORDER — HYDROCORTISONE ACETATE, PRAMOXINE HCL 2.5; 1 G/100G; G/100G
CREAM TOPICAL 3 TIMES DAILY
COMMUNITY
End: 2020-01-27 | Stop reason: SDUPTHER

## 2020-01-27 NOTE — PLAN OF CARE
Outpatient Neurological Rehabilitation  Speech and Language Therapy Evaluation    Date: 1/27/2020     Name: Micheal Hunt   MRN: [id number]    Therapy Diagnosis:   Encounter Diagnoses   Name Primary?    Oropharyngeal dysphagia Yes    Cognitive communication disorder       Physician: Pk Lakhani MD  Physician Orders: Ambulatory consult to speech therapy; Evaluation and Treat  Medical Diagnosis: Dysphagia due to old stroke    Visit # / Visits Authorized:  1 / 20   Date of Evaluation:  1/27/2020   Insurance Authorization Period: 12/09/2019 to 12/31/2020  Plan of Care Certification:    1/27/2020 to 2/27/2020      Time In: 09:00   Time Out: 10:03   Total Billable Time: 63 minutes  Procedure Min.   Swallow and Oral Function Evaluation   25   Cognitive Communication Evaluation  38     Precautions:Standard, Fall and Aspiration     Subjective   Date of Onset: 3/22/2019  History of Current Condition:   Pt's daughter gave the history. She reports they follow all precautions given, expect she does occasionally allow him some fresh fruit. She stated she and his current home health SLP have noticed an improvement in his swallow function. She denies the pt having pneumonia in the past 6 months.    Past Medical History: Micheal Hunt  has a past medical history of NICK (acute kidney injury) (7/29/2016), Allergy, Anemia, mild (12/15/2014), Arthritis, Atrial fibrillation (03/2019), Benign essential HTN (3/27/2012), BMI 29.0-29.9,adult (5/10/2018), BPH (benign prostatic hyperplasia), BPH (benign prostatic hyperplasia), CAD (coronary artery disease) (2006), Carotid artery occlusion, Chronic kidney disease, Colon polyp, CRF (chronic renal failure), stage 5, Diabetes mellitus, Diverticulosis, ESRD (end stage renal disease) on dialysis, Gastritis, GERD (gastroesophageal reflux disease), Gout, History of colon polyps (5/3/2018), HTN (hypertension) (3/27/2012), Hyperlipidemia, Hyperlipidemia, LLL pneumonia (6/14/2018), LVH  (left ventricular hypertrophy), Mesenteric ischemia, Murmur, cardiac (3/27/2012), GRICELDA (obstructive sleep apnea), Sinus problem, Stroke (03/2019), and Syncope and collapse.  Micheal Hunt  has a past surgical history that includes Shoulder surgery; rotative cuff; mid leftt finger; Appendectomy; Colonoscopy (2011); Joint replacement; Mole removal (2016); Colonoscopy (N/A, 5/3/2018); Coronary artery bypass graft (4/2007); and Cardiac catheterization (2012).     Medical Hx and Allergies: Micheal has a current medication list which includes the following prescription(s): albuterol, albuterol-ipratropium, allopurinol, amlodipine, apixaban, aspirin, atorvastatin, budesonide-formoterol 160-4.5 mcg, carvedilol, famotidine, finasteride, fluticasone propionate, furosemide, insulin aspart u-100, melatonin, mupirocin, pantoprazole, polyethylene glycol, pramoxine-hydrocortisone, sertraline, tamsulosin, and tiotropium bromide.   Review of patient's allergies indicates:   Allergen Reactions    Ace inhibitors Other (See Comments)     Cough    Arb-angiotensin receptor antagonist Itching    Eplerenone Other (See Comments)     Marked bradycardia, 40, tiredness and weakness      Sulfa (sulfonamide antibiotics) Itching and Swelling     Patient says this was 10 years ago and doesn't remember what happened     Prior Therapy:  Pt received ST, OT, and PT during admission to hospital in March of 2019. Pt received home health services for PT, OT, ST, and nursing. He has been discharged from PT, OT, and nursing for home health. He currently receives ST via home health 2x per week. SLP is Yanet (daughter did not know last name) with [facility]. He is beginning PT and OT outpatient services at the [facility]. Pt received a Modified Barium Swallow Study on 12/2/2019. The results were as follows:    Micheal is a 79 y.o. male referred to outpatient SpeechTherapy with a medical diagnosis of dysphagia, and was noted to have been diagnosed with  "cervical bone spurs.  Pt presented with mild oral prep dysphagia on solids due to missing molars, and pharyngeal dysphagia with penetration & sometimes SILENT aspiration on thin liquids, and an episode of aspiration of a crumb of cookie both due to delayed onset of the pharyngeal swallow.  Hyolaryngeal rise & tilt were reduced, and there was no epiglottic inversion, largely due to the presence of an anterior cervical protrusion from C2-6.  Multiple swallows reduced but never eliminated pharyngeal residue, liquid wash was more effective, although residue remained.  Chin tuck and small sips eliminated aspiration on nectar thick but not on thin liquids.  Head turn did not eliminate aspiration.  An esophageal sweep revealed aerophagia, but nothing which interfered with pharyngeal swallow, and nothing which explained globus sensation.  Patient will benefit from skilled dysphagia therapy.  Additionally he would benefit from physical therapy to improve endurance to sit up straight during po intake (reduce dependent larynx).     Recommend regular textures, no dry, crumbly food, moisten all meats & foods which fall into small pieces such as rice.  Nectar thick liquids, thicken all liquid on canned fruit, milk on cereal & liquid in foods such as soups; no fresh fruit except bananas.  Take pills whole in puree followed by swallow of nectar thick liquid.  If c/o pills sticking in throat, crush pills, if allowed.    Social History:  Daughter and 3 grandchildren, Retired, His house shares land with his daughter's home  Prior Level of Function: Prior to stroke, pt reports he lived an active lifestyle with regular diet and thin liquids   Current Level of Function: Uses wheelechair to ambulate, Regular textures (no dry foods) with nectar thick liquids.  Pain:   0/10  Pain Location / Description: N/A  Nutrition:  Oral diet, Meds given in applesauce  Patient's Therapy Goals:  "Get better" and "eat normal food"    Objective   Formal " "Assessment:  Clinical Swallow Exam/ Jamal Mechanism Exam:  Oral Motor Exam:  Mandibular Strength and Mobility - Trigeminal Nerve (CNV) Rest: symmetrical   Protrusion: adequate  Involuntary Movement: absent    Oral Labial Strength and Mobility -Facial Nerve (CN VII)  Rest: symmetrical   Lateralization: reduced strength and ROM  Protrusion: reduced strength and ROM  Retraction:  reduced strength and ROM  Involuntary Movement: absent    Lingual Strength and Mobility- Hypoglossal Nerve (CN XII)  Rest:unremarkable   Lateralization: unable to lateralize to left, reduced strength and ROM to right  Protrusion: reduced strength and ROM  Retraction:  uncoordinated  Involuntary Movement: absent    Velar Elevation - Glossopharyngeal Nerve (CN IX) and Vagus (CN X) Rest: symmetrical   Symmetry with Short Production of "ah": symmetrical, reduced elevation; however, pt did not present with hypernasality   Involuntary Movement: absent      Structure Abnormalities: no  Dentition: Missing molars requiring him to chew anteriorly  Secretion Management: Anterior drool noted  Mucosal Quality: adequate  Volitional Cough: adequate  Voice Prior to PO Intake: adequate    Clinical Swallow Examination:   Pt presented with:   LIQUID:-self regulated thin liquid via cup, nectar thickened liquids via cup and nectar thickened liquids via spoon, pt presented with delayed throat clear and very delayed cough following self-regulated sips of thin liquid. Pt without overt signs of penetration/aspiration on nectar thicken liquids both by the spoon and self-regulated cup sips. It should be noted that patient has a history of silent aspiration. Pt spontaneously swallowed twice following nectar thickened liquids via cup. Pt was unable to independently follow recommendations for sequential swallows (given at modified).    PUREE:- tsp bites of applesauce x5, medications given (via daughter) in applesauce. Pt without overt signs or symptoms of " penetration/aspiration on any trials of applesauce. It should be noted that pt has a history of silent aspiration.    DENTAL SOFT:- tsp bites of peaches x2. Pt with delayed cough and throat clear. Pt unable to independently employ safe swallow strategies and aspiration percautions.     DESCRIPTION: Pt should remain on current diet until repeat Modified Barium Swallow Study can be completed. Pt's daughter to discuss with MD today at scheduled appointment.      Cognitive Linguistic Quick Test (CLQT), Aphasia Administration was administered to quickly assess the patient's overall cognitive-linguistic function and to determine cognitive strengths and weaknesses.           Cognitive Domain Score     Severity Rating      Attention    58 / 215 moderate  Memory    93 / 185 moderate   Executive Functions   4 / 40  severe  Language    13 / 37  moderate  Visuospatial Skills    27 / 105 moderate  Clock Drawing    6 / 13  severe      Composite Severity Rating  2 / 4  moderate    Task Score Ages 18-69 Cut Score Ages 70-89 Cut Score Below?   Personal Facts  3  8 8 below   Symbol Cancellation 4  11 10 below   Confrontational naming  3  10 10 below   Clock drawing  6  12 11 below   Story Retelling 5  6 5 above   Symbol Trails 1  9 6 below   Generative Naming 2  5 4 below   Design Memory 4  5 4 above   Mazes 0  7 4 below   Design Generation  1  6 5 below        Description:     Pt presents with moderate to severe cognitive linguistic communication deficit. Pt would benefit from therapy to target recall of personal information, memory, attention, and word finding strategies to improve functional communication and quality of life.      Description:    Auditory Comprehension: Appears to require repetitions for auditory comprehension. This may be due to his deficits in attention seen in the results of the CLQT above.    Reading Comprehension: Not assessed in this session.    Verbal Expression: Pt with moderate word fining deficits. Pt  became emotional when asked direct questions in conversation due to word finding deficits. Pt states he knows what he wants to say, but cannot say it.    Written Expression: Not assessed in this session.    Cognition: See results above    Motor Speech/Fluency/Voice:  Pt with decreased intelligibility.     Swallowing: See above assessment    Hearing / Vision: Hearing appeared adequate at the conversational level. Pt wears readers.      Treatment   Treatment Time In: n/a  Treatment Time Out: n/a  Total Treatment Time: n/a  no treatment performed 2/2 time to complete evaluation.    Education: {Plan of Care, role of SLP in care, aspiration precautions , scheduling/ cancellation policy and insurance limitations / visit limit  was discussed with pt and his daughter. Patient and family members expressed understanding.     Home Program: Not established this session due to evaluation.  Assessment     Micheal presents to [facility] s/p medical diagnosis of  Dysphagia following past stroke.  Demonstrates impairments including limitations as described in the problem list.He presents with cognitive communication disorder and dysphagia c/b deficits in attention, memory, executive functions, language, and visuospatial skills. Positive prognostic factors include family support and pt's desire to improve his current status. Negative prognostic factors include age and time since stroke.Barriers to therapy include patient currently recieves SLP services via home health Patient will benefit from skilled, outpatient neurological rehabilitation speech therapy.    Rehab Potential: good  Pt's spiritual, cultural and educational needs considered and pt agreeable to plan of care and goals.    Short Term Goals (4 weeks):   1. Pt will recall 3 words after a 5 minute delay with min cues to increase memory.  2. Pt will recall 3-4 memory strategies with minimum assistance to increase memory.  3. Pt will list 10-15 items  in concrete categories to increase word retrieval.  4. Pt will complete low to moderate level problem solving tasks with 80% accuracy with min cues to increase problem solving.  5. Pt will recall 3-4 clear speech strategies independently to increase speech intelligibility in conversation.  6. Pt will utilize intelligibility strategies with minimal cues for increased intelligibility in conversation.  7. Pt will participate in 30-40 effortful swallows with minimal cues to increase swallow function.  8. Pt will participate in CTAR with 60 second hold x5 with min cues to increase swallow function.  9. Pt will participate in supraglottic swallow x30 with min cues to increase swallow function.    Long Term Goals (8 weeks):   1. He will use appropriate memory strategies to schedule and recall weekly activities, express needs and recall names to maintain safety and participate socially in functional living environment.   2.  He  will maintain adequate hydration/nutrition with optimum safety and efficiency of swallowing function on PO intake without overt signs and symptoms of aspiration for thin liquids.   3.  He  will utilize compensatory strategies with optimum safety and efficiency of swallowing function on PO intake without overt signs and symptoms of aspiration for thin liquids.   4. He  will develop functional and intelligible speech and utilize compensatory strategies through the use of adequate labial and lingual function, increased articulatory precision and speech prosody.      Plan     Plan of Care Certification Period: 1/27/2020  to 12/27/2020    Recommended Treatment Plan:  Patient will participate in the [facility] program for speech therapy 2 times per week to address his Cognition, Motor Speech and Swallow deficits, to educate patient and their family, and to participate in a home exercise program.    Other Recommendations:   1. Repeat Modified Barium Swallow Study        Therapist's  Name:   Amena Reagan M.A. -[facility]   1/27/2020     Physician Name: _______________________________    Physician Signature: ____________________________

## 2020-01-27 NOTE — TELEPHONE ENCOUNTER
Called the patient regarding the initial evaluation today and to discuss continuation of therapy. There was no answer. I left a message to call back at their earliest convenience. Office number is [phone].    Amena Reagan M.A. CF-SLP  1/27/2020  3:37 PM

## 2020-01-27 NOTE — PROGRESS NOTES
Subjective:       Patient ID: Micheal Hunt is a 79 y.o. male.    Chief Complaint: Hypertension    Hypertension   This is a chronic problem. The current episode started more than 1 year ago. The problem is unchanged. Pertinent negatives include no anxiety, blurred vision, headaches, malaise/fatigue, neck pain, palpitations, peripheral edema or shortness of breath. There are no associated agents to hypertension. Risk factors for coronary artery disease include obesity, male gender and dyslipidemia. Past treatments include angiotensin blockers, calcium channel blockers and diuretics. The current treatment provides moderate improvement. There are no compliance problems.  Hypertensive end-organ damage includes kidney disease. Identifiable causes of hypertension include chronic renal disease. There is no history of a hypertension causing med.       Past Medical History:   Diagnosis Date    NICK (acute kidney injury) 7/29/2016    Allergy     Anemia, mild 12/15/2014    Arthritis     Gout    Atrial fibrillation 03/2019    Benign essential HTN 3/27/2012    BMI 29.0-29.9,adult 5/10/2018    BPH (benign prostatic hyperplasia)     BPH (benign prostatic hyperplasia)     CAD (coronary artery disease) 2006    Carotid artery occlusion     60-70% FROYLAN, LICA < 50%    Chronic kidney disease     due to ibuprofen    Colon polyp     CRF (chronic renal failure), stage 5     Diabetes mellitus     Diverticulosis     ESRD (end stage renal disease) on dialysis     Gastritis     GERD (gastroesophageal reflux disease)     Gout     History of colon polyps 5/3/2018    HTN (hypertension) 3/27/2012    Hyperlipidemia     Hyperlipidemia     LLL pneumonia 6/14/2018    LVH (left ventricular hypertrophy)     Mesenteric ischemia     Murmur, cardiac 3/27/2012    GRICELDA (obstructive sleep apnea)     DOES NOT USE A MACHINE    Sinus problem     Stroke 03/2019    acute left PCA    Syncope and collapse        Review of  patient's allergies indicates:   Allergen Reactions    Ace inhibitors Other (See Comments)     Cough    Arb-angiotensin receptor antagonist Itching    Eplerenone Other (See Comments)     Marked bradycardia, 40, tiredness and weakness      Sulfa (sulfonamide antibiotics) Itching and Swelling     Patient says this was 10 years ago and doesn't remember what happened         Current Outpatient Medications:     famotidine (PEPCID) 10 MG tablet, Take 10 mg by mouth., Disp: , Rfl:     pramoxine-hydrocortisone cream, Apply topically 3 (three) times daily., Disp: 57 g, Rfl: 3    albuterol (PROVENTIL/VENTOLIN HFA) 90 mcg/actuation inhaler, 2 puffs every 4 hours as needed for cough, wheeze, or shortness of breath, Disp: 3 Inhaler, Rfl: 3    albuterol-ipratropium (DUO-NEB) 2.5 mg-0.5 mg/3 mL nebulizer solution, Take 3 mLs by nebulization every 6 (six) hours as needed for Wheezing or Shortness of Breath., Disp: 270 mL, Rfl: 0    allopurinol (ZYLOPRIM) 100 MG tablet, Take 100 mg by mouth once daily., Disp: , Rfl:     amLODIPine (NORVASC) 10 MG tablet, Take 10 mg by mouth once daily., Disp: , Rfl:     apixaban (ELIQUIS) 2.5 mg Tab, Take 1 tablet (2.5 mg total) by mouth 2 (two) times daily., Disp: 60 tablet, Rfl: 4    aspirin (ECOTRIN) 81 MG EC tablet, Take 81 mg by mouth once daily.  , Disp: , Rfl:     atorvastatin (LIPITOR) 80 MG tablet, Take 0.5 tablets (40 mg total) by mouth once daily., Disp: 45 tablet, Rfl: 3    budesonide-formoterol 160-4.5 mcg (SYMBICORT) 160-4.5 mcg/actuation HFAA, Inhale 2 puffs into the lungs every 12 (twelve) hours. Controller, Disp: 3 Inhaler, Rfl: 4    carvedilol (COREG) 25 MG tablet, Take 1 tablet (25 mg total) by mouth 2 (two) times daily with meals., Disp: 60 tablet, Rfl: 11    finasteride (PROSCAR) 5 mg tablet, Take 1 tablet (5 mg total) by mouth once daily., Disp: 90 tablet, Rfl: 3    fluticasone (FLONASE) 50 mcg/actuation nasal spray, 2 sprays (100 mcg total) by Each Nare route  "once daily., Disp: 3 Bottle, Rfl: 3    furosemide (LASIX) 20 MG tablet, Take 20 mg by mouth 2 (two) times daily., Disp: , Rfl:     insulin aspart U-100 (NOVOLOG) 100 unit/mL injection, Inject into the skin 3 (three) times daily before meals. Sliding scale, Disp: , Rfl:     melatonin 10 mg Tab, Take by mouth nightly., Disp: , Rfl:     mupirocin (BACTROBAN) 2 % ointment, Apply topically 3 (three) times daily., Disp: 1 Tube, Rfl: 2    pantoprazole (PROTONIX) 40 MG tablet, Take 1 tablet (40 mg total) by mouth once daily., Disp: 30 tablet, Rfl: 11    polyethylene glycol (GLYCOLAX) 17 gram PwPk, Take 17 g by mouth., Disp: , Rfl:     sertraline (ZOLOFT) 25 MG tablet, Take 1 tablet (25 mg total) by mouth once daily., Disp: 30 tablet, Rfl: 11    tamsulosin (FLOMAX) 0.4 mg Cap, Take 1 capsule (0.4 mg total) by mouth once daily., Disp: 90 capsule, Rfl: 2    tiotropium bromide (SPIRIVA RESPIMAT) 1.25 mcg/actuation Mist, Inhale 2.5 mcg into the lungs once daily. Controller, Disp: 4 g, Rfl: 5    Review of Systems   Constitutional: Negative for malaise/fatigue.   Eyes: Negative for blurred vision.   Respiratory: Negative for shortness of breath.    Cardiovascular: Negative for palpitations.   Musculoskeletal: Negative for neck pain.   Neurological: Negative for headaches.       Objective:      /70 (BP Location: Right arm, Patient Position: Sitting, BP Method: Large (Manual))   Pulse 67   Temp 97.8 °F (36.6 °C) (Oral)   Ht 6' 1" (1.854 m)   Wt 99.3 kg (218 lb 14.7 oz)   SpO2 97%   BMI 28.88 kg/m²   Physical Exam   Constitutional: He is oriented to person, place, and time. He appears well-developed and well-nourished.   Eyes: Pupils are equal, round, and reactive to light. Conjunctivae and EOM are normal.   Neck: Normal range of motion. Neck supple.   Cardiovascular: Normal rate, regular rhythm, normal heart sounds and intact distal pulses.   Pulmonary/Chest: Effort normal and breath sounds normal.   Abdominal: " "Soft. Bowel sounds are normal.   Musculoskeletal: Normal range of motion.   Neurological: He is alert and oriented to person, place, and time.   Skin: Skin is warm and dry.   Psychiatric: He has a normal mood and affect. His behavior is normal. Judgment and thought content normal.       Assessment:       1. Essential hypertension    2. Persistent atrial fibrillation    3. Abrasion of left hand, initial encounter    4. Dysphagia due to old stroke    5. Internal hemorrhoids    6. Dependence on renal dialysis    7. Morbid (severe) obesity due to excess calories        Plan:       Micheal was seen today for hypertension.    Diagnoses and all orders for this visit:    Essential hypertension  Controlled, continue medication  Low sodium diet  BP Readings from Last 3 Encounters:   01/27/20 136/70   10/30/19 120/73   10/11/19 122/60     Persistent atrial fibrillation  Stable, continue medication  Abrasion of left hand, initial encounter  -     mupirocin (BACTROBAN) 2 % ointment; Apply topically 3 (three) times daily.    Dysphagia due to old stroke  -     Fl Modified Barium Swallow Speech; Future    Internal hemorrhoids  -     pramoxine-hydrocortisone cream; Apply topically 3 (three) times daily.    Dependence on renal dialysis  HD on T-T-S    CRF (chronic renal failure), stage 5  Followed by Nephrology-    Morbid (severe) obesity due to excess calories  Counseled patient on his ideal body weight, health consequences of being obese and current recommendations including weekly exercise and a heart healthy diet.  Current BMI is:Estimated body mass index is 28.88 kg/m² as calculated from the following:    Height as of this encounter: 6' 1" (1.854 m).    Weight as of this encounter: 99.3 kg (218 lb 14.7 oz)..  Patient is aware that ideal BMI < 25 or Weight in (lb) to have BMI = 25: 189.1.      Patient readiness: acceptance and barriers:none    During the course of the visit the patient was educated and counseled about the " following:     Diabetes:  Discussed general issues about diabetes pathophysiology and management.  Addressed ADA diet.  Encouraged aerobic exercise.  Hypertension:   Dietary sodium restriction.  Regular aerobic exercise.    Goals: Diabetes: Maintain Hemoglobin A1C below 7 and Hypertension: Reduce Blood Pressure    Did patient meet goals/outcomes: Yes    The following self management tools provided: declined    Patient Instructions (the written plan) was given to the patient/family.     Time spent with patient: 30 minutes    Barriers to medications present (no )    Adverse reactions to current medications (no)    Over the counter medications reviewed (Yes)

## 2020-02-05 ENCOUNTER — PATIENT MESSAGE (OUTPATIENT)
Dept: REHABILITATION | Facility: HOSPITAL | Age: 80
End: 2020-02-05

## 2020-02-05 ENCOUNTER — TELEPHONE (OUTPATIENT)
Dept: REHABILITATION | Facility: HOSPITAL | Age: 80
End: 2020-02-05

## 2020-02-05 ENCOUNTER — HOSPITAL ENCOUNTER (OUTPATIENT)
Dept: RADIOLOGY | Facility: HOSPITAL | Age: 80
Discharge: HOME | End: 2020-02-05
Attending: NURSE PRACTITIONER
Payer: MEDICARE

## 2020-02-05 DIAGNOSIS — R13.19 ESOPHAGEAL DYSPHAGIA: ICD-10-CM

## 2020-02-05 DIAGNOSIS — R13.12 OROPHARYNGEAL DYSPHAGIA: Primary | ICD-10-CM

## 2020-02-05 PROCEDURE — 74230 X-RAY XM SWLNG FUNCJ C+: CPT | Mod: TC

## 2020-02-05 PROCEDURE — 92610 EVALUATE SWALLOWING FUNCTION: CPT | Mod: 59

## 2020-02-05 PROCEDURE — 74230 X-RAY XM SWLNG FUNCJ C+: CPT | Mod: 26,,, | Performed by: RADIOLOGY

## 2020-02-05 PROCEDURE — 74230 FL MODIFIED BARIUM SWALLOW SPEECH STUDY: ICD-10-PCS | Mod: 26,,, | Performed by: RADIOLOGY

## 2020-02-05 PROCEDURE — 92611 MOTION FLUOROSCOPY/SWALLOW: CPT

## 2020-02-05 PROCEDURE — 92526 ORAL FUNCTION THERAPY: CPT

## 2020-02-05 NOTE — TELEPHONE ENCOUNTER
Spoke to patient's daughter regarding today's findings and recommendations. Pt's daughter verbalized understanding. All questions were answered and pt's daughter was told to call back with any additional questions.    Amena Reagan M.A. CF-SLP  Speech Language Pathologist  2/5/2020  4:35 PM

## 2020-02-05 NOTE — PROGRESS NOTES
See evaluation in Plan of Care.    Amena Reagan M.A. CF-SLP  Speech Language Pathologist  2/5/2020

## 2020-02-05 NOTE — PLAN OF CARE
Outpatient Neurological Rehabilitation  MODIFIED BARIUM SWALLOW STUDY      Date: 2/5/2020     Name: Micheal Hunt   MRN: [id number]    Therapy Diagnosis: Oropharyngeal dysphagia, esophageal dysphagia    Physician: Vandana Anderson,   Physician Orders: Fidencio Modified Barium Swallow  Medical Diagnosis from Referral: Dysphagia, Unspecified    Date of Evaluation:  2/5/2020    Procedure Min.   Fl Modified Barium Swallow Speech  29   Swallow and Oral Function Evaluation   10     Precautions:Standard and Fall and Aspiration    Subjective     Date of Onset: March 2019    Past Medical History: Micheal Hunt  has a past medical history of NICK (acute kidney injury) (7/29/2016), Allergy, Anemia, mild (12/15/2014), Arthritis, Atrial fibrillation (03/2019), Benign essential HTN (3/27/2012), BMI 29.0-29.9,adult (5/10/2018), BPH (benign prostatic hyperplasia), BPH (benign prostatic hyperplasia), CAD (coronary artery disease) (2006), Carotid artery occlusion, Chronic kidney disease, Colon polyp, CRF (chronic renal failure), stage 5, Diabetes mellitus, Diverticulosis, ESRD (end stage renal disease) on dialysis, Gastritis, GERD (gastroesophageal reflux disease), Gout, History of colon polyps (5/3/2018), HTN (hypertension) (3/27/2012), Hyperlipidemia, Hyperlipidemia, LLL pneumonia (6/14/2018), LVH (left ventricular hypertrophy), Mesenteric ischemia, Murmur, cardiac (3/27/2012), GRICELDA (obstructive sleep apnea), Sinus problem, Stroke (03/2019), and Syncope and collapse.  Micheal Hunt  has a past surgical history that includes Shoulder surgery; rotative cuff; mid leftt finger; Appendectomy; Colonoscopy (2011); Joint replacement; Mole removal (2016); Colonoscopy (N/A, 5/3/2018); Coronary artery bypass graft (4/2007); and Cardiac catheterization (2012).    Pt endorsed neurological diagnoses of a stroke and denied all other neurological diagnoses. Pt denied pulmonary diagnoses. Pt denied throat/neck surgery. Pt  denied current GI diagnoses. Pt has a past medical history of GERD. He has been receiving dysphagia therapy through speech pathology services in Home health. Pt was unable to give the name of the company; however, home health SLP was named [employer].    Medical Hx and Allergies:    Review of patient's allergies indicates:   Allergen Reactions    Ace inhibitors Other (See Comments)     Cough    Arb-angiotensin receptor antagonist Itching    Eplerenone Other (See Comments)     Marked bradycardia, 40, tiredness and weakness      Sulfa (sulfonamide antibiotics) Itching and Swelling     Patient says this was 10 years ago and doesn't remember what happened       Relevant Imaging:    Results for orders placed during the hospital encounter of 05/08/19   CT Head Without Contrast    Narrative EXAMINATION:  CT HEAD WITHOUT CONTRAST    CLINICAL HISTORY:  fall on coumadin;    TECHNIQUE:  Low dose axial images were obtained through the head.  Coronal and sagittal reformations were also performed. Contrast was not administered.    COMPARISON:  None.    FINDINGS:  There is no acute intracranial hemorrhage, hydrocephalus, midline shift or mass effect. There is redemonstration of regions of prior infarction involving the PCA territory and the left thalamus.  Additionally there is patchy and more confluent supratentorial and periventricular white matter hypoattenuation which is nonspecific but likely reflect sequela of chronic microvascular ischemic change.  The basilar cisterns are patent. The visualized paranasal sinuses and mastoid air cells are clear of acute process. The osseous calvarium is intact.      Impression 1. No CT evidence of acute intracranial abnormality. Clinical correlation and further evaluation as warranted.  2. Redemonstration of prior infarcts involving the left thalamus and left PCA territory with findings suggestive of significant chronic microvascular ischemic change.      Electronically signed by: Nanci  MD Nilo  Date:    05/09/2019  Time:    00:48       Results for orders placed during the hospital encounter of 03/11/19   X-Ray Chest PA And Lateral    Narrative EXAMINATION:  XR CHEST PA AND LATERAL    CLINICAL HISTORY:  Pneumonia, unspecified organism    TECHNIQUE:  PA and lateral views of the chest were performed.    COMPARISON:  02/07/2019    FINDINGS:  There has been a prior sternotomy.  The heart is enlarged.  There is calcification of the aorta.  Subtle infiltrates are present within the mid to lower lung zones bilaterally, left greater than right.      Impression Mild pulmonary infiltrates favoring pneumonia.    Cardiomegaly, atherosclerosis and prior sternotomy.      Electronically signed by: Huang Mayer MD  Date:    03/11/2019  Time:    09:50     The previous modified barium swallow study (12/2/2019) showed the following:     Pt presented with mild oral prep dysphagia on solids due to missing molars, and pharyngeal dysphagia with penetration & sometimes SILENT aspiration on thin liquids, and an episode of aspiration of a crumb of cookie both due to delayed onset of the pharyngeal swallow.  Hyolaryngeal rise & tilt were reduced, and there was no epiglottic inversion, largely due to the presence of an anterior cervical protrusion from C2-6.  Multiple swallows reduced but never eliminated pharyngeal residue, liquid wash was more effective, although residue remained.  Chin tuck and small sips eliminated aspiration on nectar thick but not on thin liquids.  Head turn did not eliminate aspiration.  An esophageal sweep revealed aerophagia, but nothing which interfered with pharyngeal swallow, and nothing which explained globus sensation.  Patient will benefit from skilled dysphagia therapy.  Additionally he would benefit from physical therapy to improve endurance to sit up straight during po intake (reduce dependent larynx).     Recommend regular textures, no dry, crumbly food, moisten all meats & foods  which fall into small pieces such as rice.  Nectar thick liquids, thicken all liquid on canned fruit, milk on cereal & liquid in foods such as soups; no fresh fruit except bananas.  Take pills whole in puree followed by swallow of nectar thick liquid.  If c/o pills sticking in throat, crush pills, if allowed.        Objective     Modified Barium Swallow Study  Purpose: to evaluate anatomy and physiology of the oropharyngeal swallow, to determine effectiveness of rehabilitation strategies, and to determine diet consistency and intervention recommendations.       02/05/20 0941   Speech Time Calculation   Speech Start Time 0941   Speech Stop Time 1028   Speech Total (min) 47 min   General Information   Current Respiratory Status room air   General Precautions fall;aspiration;aphasia   Current Diet   Current Diet Textures Regular;Nectar-Thick Liquids  (With Esquivel water protocol)   Current Liquid Consistencies Nectar Thick   Subjective   Patient states He wants to be able to not drink thick liquids   Oral Musculature Evaluation   Oral Musculature WFL   Dentition other (see comments)  (Missing molars, Chews anteriorly)   Secretion Management adequate   Mucosal Quality adequate   Mandibular Strength and Mobility WFL   Oral Labial Strength and Mobility WFL   Lingual Strength and Mobility WFL   Velar Elevation WFL   Buccal Strength and Mobility WFL   Volitional Cough weak; however, caregiver reports spontaneous coughs are stronger than cued coughs, This was observed throughout the session.   Voice Prior to PO Intake Clear, low volume, Mild dysarthria noted        MBS Eval: Thin Liquid Trial   Mode of Presentation, Thin Liquid spoon;fed by clinician;cup;self fed   Volume of Thin Liquid Presented tsp x 1, cup sip x3   Oral Prep/Phase, Thin Liquid delayed anterior/posterior movement;premature spillage;slow oral transit time   Oral Residue, Thin Liquid   (Residue stuck to coating of the tongue)   Pharyngeal Phase, Thin Liquid  impaired;decreased base of tongue retraction;delayed coughing/choking;no epiglottic inversion;delayed pharyngeal swallow;vallecular stasis;cervical protrusion (comment)  (vallecular residue varied from minimal to severe possibly secondary to fatigue and size of the bolus, Osteophytes noted C2-C6)   Rosenbeck's 8-Point Penetration-Aspiration Scale, Thin Liquids (1) Material does not enter airway.;(7) Material enters the airway, passes below the vocal folds, and is not ejected from the trachea despite effort.;(8) Material enters the airway, passes below the vocal folds, and no effort is made to eject.;(2) Material enters the airway, remains above the vocal folds, and is ejected from the airway.  (1 with tsp, 2, 7, and 8 with cup sip)   Penetration/Aspiration, Thin Liquid High penetration during the swallow, aspiration after the swallow on residuals from pyriform sinuses   Additional Comments Severe pharyngeal dysphagia        MBS Eval: Nectar Thick Liquid Trial   Mode of Presentation, Nectar spoon;fed by clinician;cup;self fed;straw   Volume of [place] Presented tsp x2, cup x1, straw x1   Oral Prep/Phase, Nectar delayed anterior/posterior movement;premature spillage;slow oral transit time   Oral Residue, Nectar other (see comments)  (Residue stuck to coating of tongue)   Pharyngeal Phase, Nectar impaired;decreased base of tongue retraction;no epiglottic inversion;vallecular stasis;multiple spontaneous swallows;delayed pharyngeal swallow;cervical protrusion (comment)  (Valecular residue varied from minimal to severe, Osteophytes noted C2-C6)   Rosenbeck's 8-Point Penetration-Aspiration Scale, Nectar Thick Liquids (2) Material enters the airway, remains above the vocal folds, and is ejected from the airway.;(8) Material enters the airway, passes below the vocal folds, and no effort is made to eject.;(7) Material enters the airway, passes below the vocal folds, and is not ejected from the trachea despite effort.  (2 on  cup, 7 and 8 on tsp)   Penetration/Aspiration, Nectar Penetration and aspiration during the swallow and penetration of residuals after the swallow   Diagnostic Statement Severe pharyngeal dysphagia        MBS Eval:Thin Honey Thick Liquid Trial   Mode of Presentation, Honey cup;self fed;spoon;fed by clinician   Volume of Honey Presented cup x1, tsp x1   Oral Prep/Phase, Honey delayed anterior/posterior movement;premature spillage;slow oral transit time   Oral Residue, Honey   (Residue stuck to coating of the tongue)   Pharyngeal Phase, Honey impaired;cervical protrusion (comment);decreased base of tongue retraction;delayed coughing/choking;no epiglottic inversion;premature spillage;vallecular stasis  (Osteophytes noted C2-C6)   Rosenbeck's 8-Point Penetration-Aspiration Scale, Honey Thick Liquids (1) Material does not enter airway.   Diagnostic Statement Mild to moderate pharyngeal dyspahgia        MBS Eval: Pureed Trial   Mode of Presentation, Puree spoon;fed by clinician   Volume of Puree Presented spoon x5   Oral Prep/Phase, Puree delayed anterior/posterior movement;slow oral transit time   Oral Residue, Puree   (Residue stuck to coating on the tongue)   Pharyngeal Phase, Puree impaired;cervical protrusion (comment);multiple spontaneous swallows;no epiglottic inversion;delayed coughing/choking;reduction in laryngeal elevation;vallecular stasis  (Valecular residue varied from trace to mild, Osteophytes noted C2-C6)   Rosenbeck's 8-Point Penetration-Aspiration Scale, Pureed (1) Material does not enter airway.   Esophageal Phase, Puree Delayed emptying; retrograde flow    Diagnostic Statement Mild pharyngeal dysphagia        MBS Eval: Soft Solid Trial   Mode of Presentation, Semisolid spoon;self fed   Volume of Semisolid Food Presented 1 1/2 piece of peach in thin liquid   Oral Prep/Phase, Semisolid premature spillage;piecemeal deglutition;slow oral transit time  (Anterior mastication)   Oral Residue, Semisolid    (Residue stuck to coating on the tongue)   Pharyngeal Phase, Semisolid no epiglottic inversion;vallecular stasis;cervical protrusion (comment);decreased base of tongue retraction  (Moderate vallecular residue, Osteophytes noted C2-C6)   Rosenbeck's 8-Point Penetration-Aspiration Scale, Semisolid (1) Material does not enter airway.   Diagnostic Statement Mild pharyngeal dysphagia        MBS Eval: Solid Food Texture Trial   Mode of Presentation, Solid fed by clinician   Volume of Solid Food Presented 1/2 cookie with barium pudding   Oral Prep/Phase, Solid piecemeal deglutition;delayed anterior/posterior movement  (Anterior chewing)   Oral Residue, Solid   (Residue stuck to coating on the tongue)   Pharyngeal Phase, Solid decreased base of tongue retraction;no epiglottic inversion;delayed pharyngeal swallow;vallecular stasis;cervical protrusion (comment)  (Swallow triggered at the valeculae, Osteophytes noted C2-C6, Valecular residue was  moderate)   Rosenbeck's 8-Point Penetration-Aspiration Scale, Solid (1) Material does not enter airway.   Diagnostic Statement Mild to moderate pharyngeal dysphagia   Recommendations   NPO No   Solid Diet Level Regular   Liquid Diet Level Honey Thick  (Thin honey)   Additional Recommendations No straws, No dry crumbly foods, Medications crushed in Puree   Treatment/Billable Minutes   Treatment Swallowing Dysfunction 8   Eval Swallow and Oral Function 10   Motion Fluoro Swallow, Cine/Vid 29   Total Time 47         Treatment     Treatment Time In: 10:20  Treatment Time Out: 10:28  Total Treatment Time: 8 minutes  Treatment focused on discussion of results and modification of textures and consistancies. See recommendations below.    Education: Plan of Care, role of SLP in care and aspiration precautions  Patient expressed understanding.     Home Program: No home program established, It is recommended     Assessment     Micheal Hunt is a 79 y.o. male referred for Modified  Barium Swallow Study with a medical diagnosis of Dysphagia, unspecified.  Patient presents with Mild oral dysphagia and severe pharyngeal dysphagia.      The radiologist was called to consult regarding the cervical protrusion at C5-C6 which would not allow the epiglottis to invert. The radiologist also consulted regarding residue at the lingual tonsils which was present after the first trial of puree, and remained for all subsequent swallows.      CURRENT MBSS FINDINGS:     Oral Phase Impairments:     Oral phase impairments were characterized by delayed bolus transit and poor timing/initiation of the swallow. Pt demonstrated prolonged chewing which was an effective adapted compensatory strategy due to missing molars.    Pharyngeal Phase Impairments:     Pharyngeal phase impairments were characterized by decreased laryngeal elevation, no epiglottic inversion (secondary to the presence of osteophytes), delayed onset of pharyngeal swallow resulted in delayed laryngeal vestibule closure, and decreased contact of BOT to posterior pharyngeal wall.     Penetration/Aspiration:      Penetration and aspiration was noted on thin liquids and nectar thick liquids.The cued cough was not effective at clearing penetration or aspiration; however, when the pt spontaneously coughed, the cough was inconsistently effective at clearing penetration but not aspiration.     Residue:     Trace oral residue was noted on all textures and consistencies secondary to the barium sticking to the coating on the tongue. Vallecular residue was noted on all consistencies to various degrees with increasing consistencies. Pyriform sinus residue was noted on puree; however, this was only at the end of the session, possibly secondary to fatigue. Pharyngeal residue increased with increasing consistency and texture and as the patient fatigued.    Esophageal Phase Impairments:     Esophageal phase impairments were characterized by dysmotility and delayed  emptying as seen on esophageal sweep.     The following strategies were tried: chin tuck, which was not successful. Change in the bolus size was also trialed and found to be an effective strategy.     Impressions:     The patient presents with mild oral dysphagia, mild to severe pharyngeal dysphagia, and esophageal dysphagia.     Recommendations:     1. Regular consistencies (IDDSI 7) and thin-honey thick liquids (IDDSI 3). Thicken all liquids to thin honey thickness including liquid on food (soups, milk on cereal, etc) and liquid medications. Avoid dry, crumbly foods. Fresh fruits may be consumed; however, avoid melons. NO STRAWS.  2. Medications should be taken crushed when possible and always administered in puree.  3. Continue Esquivel Free Water Protocol for added hydration. Pt has previously been educated regarding the risks and benefits.   4. Dysphagia therapy including Chin Tuck Against Resistance (CTAR), Super Supraglottic swallow, Mendelsohn, Jessica, EMST, and Shaker.   5. Follow swallow guidelines including sit upright for all PO intake, small bites and sips, good oral hygiene, avoid straws, remain upright for at least 2 hours following an PO intake (to decrease risk of esophageal reflux), avoid dependent larynx, and eat smaller more frequent meals to avoid fatigue.   6. Follow up Modified Barium Swallow Study should be completed following 8 weeks of dysphagia therapy.    Please contact [facility] at [phone] if there are questions re: the above or if we can be of additional service to this patient.      Therapist's Name:   Amena Reagan M.A. CF-SLP  Speech Language Pathologist  2/5/2020        Date: 2/5/2020

## 2020-02-07 ENCOUNTER — TELEPHONE (OUTPATIENT)
Dept: GASTROENTEROLOGY | Facility: CLINIC | Age: 80
End: 2020-02-07

## 2020-02-07 NOTE — TELEPHONE ENCOUNTER
----- Message from Messi Harris MD sent at 2/6/2020  4:54 PM CST -----  Advise patient's daughter that speech evaluation noted.  EGD per previous recommendations has still not been scheduled.  Please schedule.  Also needs follow up with [facility] per notes.      ----- Message -----  From: Amena Reagan -SLP  Sent: 2/5/2020   4:16 PM CST  To: Messi Harris MD    Forwarding on request of the patient's daughter.    Amena Reagan M.A. -SLP  Speech Language Pathologist  2/5/2020

## 2020-05-27 NOTE — TELEPHONE ENCOUNTER
Spoke to patient daughter about outpatient swallowing therapy following discharge from skilled nursing. Patient's daughter is interested in having him be seen here.    Amena Reagan M.A. CF-SLP  Speech Language Pathologist  5/27/2020

## 2020-06-23 NOTE — TELEPHONE ENCOUNTER
Patient had Seizure with in March 2020. Patient was placed in [facility] in [facility], but was recently discharged home. Patient's daughter reported to Maria Luisa ([facility]) nurse today patient is experiencing abdominal distention. Patient receives continuous peg tube feeding from 8 PM to 8 AM with 30 mg water flush prior/after feeding/medication administration. Patient has Dialysis on Monday, Wednesday, and Friday. He receives a bolus of Nepro (2 cans) on Dialysis days 6 hours apart at 10:30 AM and 5:30 PM. Please advise.               ----- Message from Lidia Dinh sent at 6/23/2020  3:19 PM CDT -----  Contact: Maria Luisa with [facility]  Type: Needs Medical Advice  Who Called:  Maria Luisa with [facility]  Symptoms (please be specific):    How long has patient had these symptoms:    Pharmacy name and phone #:    Best Call Back Number: [phone] or Tonya the daughter [phone]  Additional Information: [facility] Luisa admitted patient to [facility] on Friday. Patient came home with a Nepro feeding tube. Nurse is reporting stomach is distended. Family was concerned about it. Please call Maria Luisa to advise. Thanks!

## 2020-06-23 NOTE — TELEPHONE ENCOUNTER
Peg feedings. Continuing current management.  If Residual higher than 30 cc then delay feedings by 4 hrs or give 1/2 next feeding.This has been fully explained to the patient, who indicates understanding.

## 2020-06-25 NOTE — LETTER
July 1, 2020    Micheal Hunt  [address] [place] [place] [address]             [facility] [address] [facility] [address] [address] Dear   Gloriajeremy:    Welcome to [facility] Complex Care Management Program.  It was a pleasure talking with you today.  My name is Kaylyn Jones RN CCM . I look forward to working with you as your Care Manager.  My goal is to help you function at the healthiest and highest level possible.  You can contact me directly at [phone].    As an [facility] patient, some of the services we may be able to provide include:      Development of an individualized care plan with a Registered Nurse    Connection with a    Connection with available resources and services     Coordinate communication among your care team members    Provide coaching and education    Help you understand your doctors treatment plan   Help you obtain information about your insurance coverage.     All services provided by [facility] Complex Care Managers and other care team members are coordinated with and communicated to your primary care team.    As part of your enrollment, you will be receiving education materials and more information about these services in your [facility] account, by phone or through the mail.  If you do not wish to participate or receive information, please contact our office at [phone].      Sincerely,        Kaylyn Jones RN CCM [facility]   Out-patient RN Complex Care Manager

## 2020-06-25 NOTE — PROGRESS NOTES
06/25/20-Attempt assessment with  Patient/caregiver  for outpatient case management. No answer. Left message requesting call back. RN OPCM 1'st assessment attempt.   06/29/20-Attempt assessment with  Patient/caregiver for outpatient case management. Daughter is unavailable at this time. She will call back tomorrow morning. RN OPCM 2'nd assessment attempt.   06/30/20-Received phone call from daughter, Tonya who is MPOA. Patient was discharged from [facility] in [place] this year to live with Tonya , (daughter),son in law, three grandchildren, (twin 4 year olds and   8 years old). 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 In 03/2020 patient had a seizure and his on keppra that is $300.00 a month. Patient is bed bound with a peg tube. He receives Nephro thru the peg tube.  He goes to [facility] in [place] on M-W-F. Patient has [facility] with a nurse, aide , PT/OT. Patient has advanced aspiration and does not require ST. Patient is depressed per Tonya.   Med Rec done.  Nephrologist-Dr. Landeros   07/01/20-Called [place] [facility] [phone] and they do not provide services [place] state [place] at this [place]. So a NP will not be able to see the patient. Will update daughter at next follow up call.     PLAN-  Refer to pharmacy assistance for Keppra 7.5 ml liquid daily in peg tube $300.00 a month from Dr. Hurst.in [place], [place] --done   Refer to  for assistance at home,transportation, and PHQ=9.--done   Daughter filled out [employer] aide and attendance paper work last year. She would like to know if his insurance has any long term care benefits at home.Neighbor is providing transportation to appointments and dialysis but is going to stop when her granddaughter moves in with her. Son in law is a  in [place].  Message to Dr. Lakhani.  Med Rec done and patient takes Keppra 7.5 ml liquid thru peg tube and Docu Liquid 5 ml daily thru peg tube. Patient is not  taking-albuterol inhaler, duo-nebs, Flonase, lasix, novolog insulin, Bactroban, Protonix, glycolax,pramonine-hydrocortosine cream Zoloft, Flomax and spiriva. PHQ=9. Patient has [facility] and per daughter was faxing a list of medications to Dr. Lakhani. -done      Clinical Reference Documents Added to Patient Instructions-Mailed        Document    DEPRESSED, WHEN A LOVED ONE IS (ENGLISH)    DEPRESSION (ENGLISH)    FALLS IN THE HOME, PREVENTING (ENGLISH)    FIRE SAFETY, HOME (ENGLISH)      Mailed Senior Resource guide     Outpatient Care Management  Initial Patient Assessment    Patient: Micheal Hunt  MRN: [id number]  Date of Service: 06/25/2020  Completed by: Kaylyn Jones RN  Referral Date: 10/11/2019  Program: Case Management (High Risk)    Reason for Visit   Patient presents with    OPCM Enrollment Call    OPCM RN First Assessment Attempt     06/25/20    OPCM RN Second Assessment Attempt     06/29/20    Initial Assessment     06/30/20    Nursing Assessment    PHQ-9    Plan Of Care       Brief Summary: See above note     Assessment Documentation     OPCM Initial Assessment    Involvement of Care  Do I have permission to speak with other family members about your care?: Yes (Comment: daughter-Tonya JUAREZ)  Assessment completed by: Children (Comment: Tonya-daughter)  Patient Reported Insurance  Verified current insurance plan: Medicare, Other (see comment)  Current Health Status  Patient Health Rating  Compared to other people your age, how would you rate your health?: Fair  Patient Reported Labs & Vitals  Any patient reported labs and/or vitals?: No  Social Determinants  Advanced Care Planning  Do you have any of the following?: Medical power of , Living will  If yes, do we have a copy?: Yes  If no, would the patient like Advance Directive resources?: N/A  Advanced Care Planning resources provided?: No  Is Advanced Care Planning an area of need?: No  Support  Caregiver presence?:  Yes  Name of primary caregiver: NORYDAUGHTER  Primary caregiver phone number: [phone]  Primary caregiver relationship: son/daughter (Comment: daughter)  Present activity level: need help from person or special equipment to leave house  Who takes you to your medical appointments?: friend, son/daughter  Is the caregiver supporting a need?: Yes (Comment: needs more assistance at home )  Does the current primary caregiver meet the patient's needs?: No  Housing  Living arrangements: family members (Comment: daughter, son in law and 3 grandchildren )  Number of people in home: 5  Type of residence: single family home  Own or rent?: own  Permanent residence?: yes  Does the patient's residence have any of the following?: n/a (Comment: wheel chair ramp)  Is housing an area of need?: no  Access to [employer]  Does the patient have access to any of the following devices or technologies?: SmartPhone, home computer, Internet access  Clinical Assessment  Medication Adherence  How does the patient obtain their medications?: family picks up  How many days a week do you miss medications?: never  Do you use a pill box or medication chart to help you manage your medications?: pill box  Do you sometimes have difficulty refilling your medications?: yes (see comment) (Comment: keppra cost $300.00 )  Medication reconciliation completed?: Yes  Is medication adherence an area of need?: No  *Active medication list was reviewed and reconciled with patient and/or caregiver:   Nutritional Status  Diet: renal diet (Comment: peg tube )  Change in appetite?: no  Dentition: N/A  Labs  Do you have regular lab work to monitor your medications?: no  Type of lab work: n/a  Where do you get your lab work done?: home health (Comment: [facility] )  Is lab adherence an area of need?: no  PHQ Depression Screen  Does patient's PHQ Depression Screening indicate a barrier to meeting self-care needs?: Yes (Comment: PHQ=9)  Action  taken: refer to SW and sent PCP a message   Cognitive/Behavioral Health  Alert and oriented?: yes  Difficulty thinking?: yes  Requires prompting?: yes  Requires assistance for routine expression?: yes  Is Cognitive/Behavioral health an area of need?: no  Culture/Yazidism  Does patient have cultural or Judaism beliefs that may impact ability to access healthcare?: no  Communication  Language preference: English   needed?: no  Hearing problems?: no  Decreased vision?: yes  Legally blind?: no  Vision assistance: glasses  Is Communication an area of need?: no  Health Literacy  Preferred learning method: face to face, hands on, reading materials  How often do you need to have someone help you read instructions, pamphlets, or other written material from your doctor or pharmacy?: occasionally  Is there a Health Literacy need?: no  Activities of Daily Living  Ambulation: dependent  Dressing: dependent  Bathing: dependent  Transfers: dependent  Toileting: dependent  Feeding: dependent  Cleaning home/chores: dependent  Telephone use: assistance required  Shopping/attending doctors' appointments: dependent  Paying bills: assistance required  Taking meds: assistance required  Climbing stairs: dependent  Fall Risk  Patient mobility status: Ambulatory w/ assistance  Number of falls in the past 12 months: 2+  Fall risk?: Yes  Equipment/Current Services  Equipment/supplies used in home: hospital bed, walker, wheelchair, shower chair/rails, Lydia lift, bedside commode  Current services: dialysis, home health nurse, home health aid, physical therapy, occupational therapy (Comment: [facility], dialysis m-w-f)  Is Equipment/Supplies/Services an area of need?: no  Community & Government Programs  Support: none  Government suppot assistance: N/A  [facility] Office of Aging and Adult Services: N/A  Community Resource Assessment  Based on the assessment of needs: Patient is in need of community resources  OPCM   consulted to assist with the following: transportation, mental health resources  Completion of Initial Assessment  Is the Initial Assessment Complete at this time?: yes         Problem List and History     Patient Active Problem List   Diagnosis    Osteoarthritis of right knee    Nocturia    Carotid artery stenosis    Essential hypertension    Hyperlipidemia    Vertigo    CAD (coronary artery disease), 2V CABG 2007    Gout, arthritis    Obesity, Class I, BMI 32.8 to 34.3, 32.7, today 30.4    Dizziness    LVH (left ventricular hypertrophy) due to hypertensive disease    Abnormal cardiovascular stress test    GERD (gastroesophageal reflux disease)    Elevated PSA    Acquired hammer toe    Diastolic dysfunction    Abdominal obesity    Back pain, chronic    Glucose intolerance (impaired glucose tolerance)    Obstructive sleep apnea    Anemia of chronic disease    Gastric nodule    Diverticulitis, 2005, 2015    Personal history of colonic polyps    PSA elevation    DDD (degenerative disc disease), lumbar    LV dysfunction, EF 50%, reduced to 34%, now 46%    Other spondylosis, lumbar region    Knee pain    Benign prostatic hyperplasia without lower urinary tract symptoms    Itching, both legs, onset 7/2017    Chronic sinusitis    Sigmoid diverticulitis, 3 episodes, 2005, 2015, 2018    Anemia due to chronic kidney disease, on chronic dialysis    Renal cyst    Unilateral inguinal hernia    Chronic kidney disease, stage 5    Generalized abdominal pain    Aortic stenosis    Secondary hyperparathyroidism    Erythropoietin (EPO) stimulating agent anemia management patient    Enteritis    CRF (chronic renal failure), stage 5    Hyponatremia    Hypocalcemia    Mild malnutrition    Mild asthma with acute exacerbation    Iron deficiency anemia    Dialysis patient, onset 11/2018    Pain of left hand    Immune deficiency disorder    Aortic calcification    Persistent atrial  fibrillation    ESRD (end stage renal disease)    NSVT (nonsustained ventricular tachycardia)    Long term (current) use of anticoagulants    Acute on chronic diastolic congestive heart failure    Type 2 diabetes mellitus, without long-term current use of insulin    Hypertension due to end stage renal disease caused by type 2 diabetes mellitus, on dialysis    Pulmonary hypertension    NSTEMI (non-ST elevated myocardial infarction)    ESRD (end stage renal disease) on dialysis    Cerebrovascular accident (CVA) due to occlusion of left posterior cerebral artery    Patent foramen ovale    Cellulitis of right arm    Dysarthria due to acute cerebrovascular accident (CVA)    Encephalopathy    Groin hematoma    Cytotoxic cerebral edema    Umbilical hernia without obstruction and without gangrene  Falls-ACTIVE     Umbilical hernia    Sprain of right wrist    Right homonymous hemianopsia due to recent cerebral infarction    RABIA (internuclear ophthalmoplegia), left    History of stroke    Chronic right shoulder pain    Morbid (severe) obesity due to excess calories    Hemiplegia and hemiparesis following cerebral infarction affecting right dominant side    COPD with asthma    Oropharyngeal dysphagia    Cognitive communication disorder    Esophageal dysphagia       Reviewed Active Problem List with patient and/or Caregiver. The following were identified as areas of need: Depression and safety/falls     Medical History:  Reviewed medical history with patient and/or caregiver    Social History:  Reviewed social history with patient and/or caregiver    Complex Care Plan    Care plan was discussed and completed today with input from patient and/or caregiver.        Patient Instructions     Instructions were provided via the [facility] patient resources and are available for the patient to view on the patient portal, if active.    Next steps: see above note     Follow up in about 7 days (around 7/8/2020)  for RN Follow up call.    Todays OPCM Self-Management Care Plan was developed with the patients/caregivers input and was based on identified barriers from todays assessment.  Goals were written today with the patient/caregiver and the patient has agreed to work towards these goals to improve his/her overall well-being. Patient verbalized understanding of the care plan, goals, and all of today's instructions. Encouraged patient/caregiver to communicate with his/her physician and health care team about health conditions and the treatment plan.  Provided my contact information today and encouraged patient/caregiver to call me with any questions as needed.

## 2020-07-01 NOTE — TELEPHONE ENCOUNTER
----- Message from Tim Moreno sent at 7/1/2020 10:48 AM CDT -----  Contact: elda Alcocer  Type:  RX Refill Request    Who Called:  Elda  Refill or New Rx:  refill  RX Name and Strength:  levetiracetam (Kepra) 100 mg / ml (liquid form)  How is the patient currently taking it? (ex. 1XDay):  7.5 ml 1 x day  Is this a 30 day or 90 day RX:  90  Preferred Pharmacy with phone number:    [facility]/pharmacy #[id number] - [place], [place] - [address] AT [facility]  [address]  [place] [address]  Phone: [phone] Fax: [phone]    Local or Mail Order:    Ordering Provider:    [facility] Call Back Number:  [phone]  Additional Information:  pt will need this filled prior to his next apt with the provider. Please call to advise

## 2020-07-01 NOTE — TELEPHONE ENCOUNTER
----- Message from Kaylyn Jones RN sent at 7/1/2020  9:58 AM CDT -----  Regarding: Med Rec  Med Rec done with daughter and patient takes Keppra 7.5 ml liquid thru peg tube and Docu Liquid 5 ml daily thru peg tube. Patient is not taking-albuterol inhaler, duo-nebs, Flonase, lasix, novolog insulin, Bactroban, Protonix, glycolax,pramonine-hydrocortosine cream Zoloft, Flomax and spiriva. Patient has [facility] and per daughter was faxing a list of medications to Dr. Lakhani.   PHQ=9. Referring patient to [facility] SW for depression.     Thank you for your assistance,   Kaylyn Jones RN [facility]   Outpatient Complex Care Management   EXT. [id number]

## 2020-07-01 NOTE — TELEPHONE ENCOUNTER
Tiara from [facility] called advised that the patient needs levetiracetam (Kepra) 100 mg / ml (liquid form) refill. SIG take 7.5ml/day.  This medication was Rx by Dr. Flores, he's the physician at [facility] in [place] where the patient was in Rehab. The patient started taking this medication back in March for seizure. The patient is almost out and requesting refill to be sent. The patient has appt with PCP, Dr. Lakhani on 08/03/20

## 2020-07-02 NOTE — TELEPHONE ENCOUNTER
Message related to this matter originally received on yesterday's date (7-2-2020) and forwarded to provider for review by Lety Ch LPN.  Awaiting provider approval. Patient's daughter (Tonya) notified. Verbalized understanding.

## 2020-07-02 NOTE — TELEPHONE ENCOUNTER
----- Message from Unique Whitt sent at 7/2/2020  9:59 AM CDT -----  Regarding: Advice/Refill  Contact: Tonya  Type:  RX Refill Request    Who Called:  Daughter, Tonya  Refill or New Rx:  Refill  RX Name and Strength:  Levetiracetam 100mg  How is the patient currently taking it? (ex. 1XDay):  1xDay  Is this a 30 day or 90 day RX:   Preferred Pharmacy with phone number:  [facility]/pharmacy #[id number] - [place], [place] - [address] AT [facility] [phone] (Phone)   Local or Mail Order:  Local  Ordering Provider:  nursing home   Best Call Back Number:  [phone]  Additional Information:    Daughter called in regards to this epilepsy medication.  Ever since discharging from nursing home, they are having difficulty getting this filled. Daughter very worried because patient is almost out and states he cannot survive without it. Was told to follow up with PCP to have this filled,   Please call daughter to advise, thank you.

## 2020-07-08 NOTE — LETTER
July 10, 2020    Micheal Hunt  [address]  [place] [address]             [facility] [address] [facility] [address] Dear Mr. Hunt:    I work with [facility]'s Outpatient Case Management Department. I have been unsuccessful at reaching you to follow-up to see how you have been doing. Please call me back at your earliest convenience to discuss your health care needs.      I can be reached at [phone] from 8:00AM to 4:30 PM on Monday thru Friday. [facility] is a program offered through [facility] where a nurse is available 24/7 to answer questions or provide medical advice, their number is [phone] or [phone].    Sincerely,       Kaylyn Jones RN [facility]   Outpatient Case Management

## 2020-07-08 NOTE — PROGRESS NOTES
07/08/20- Attempt to follow up with patient/caregiver  for outpatient case management. Daughter answered the phone number called  [phone] and said she was in town. Updated her that [facility] pharmacy assistance was unable to find a program to assist with the keppra thru a peg tube. Daughter asked when SW would be calling her. Sent message to SW about daughter asking when SW would be following up with them. Will follow up with patient/caregiver later this week. RN OPCM 1'st follow up attempt.    07/10/20-Attempt follow up with patient/caregiver  for outpatient case management. No answer. Left message requesting call back. Letter will be mailed to patient. RN OPCM 2'nd follow up attempt.   07/17/20-Called and spoke to Tonya, iftikhar. Reviewed care plans for falls and depression. Patient with no falls. Patient seems less depressed per daughter. Tonya will call SW today. Updated FARSHAD Herrmann, verbalized understanding.     Outpatient Care Management  Plan of Care Follow Up Visit    Patient: Micheal Hunt  MRN: [id number]  Date of Service: 07/08/2020  Completed by: Kaylyn Jones RN  Referral Date: 10/11/2019  Program: Case Management (High Risk)    Reason for Visit   Patient presents with    OPCM RN First Follow-Up Attempt     07/08/20    OPCM RN Second Follow-Up Attempt     07/10/20-Letter Mailed     Update Plan Of Care     07/17/20       Brief Summary: see bry luna    Patient Summary     Involvement of Care:  Do I have permission to speak with other family members about your care?   Tonya daughter-MPOA     Patient Reported Labs & Vitals:  1.  Any Patient Reported Labs & Vitals?     2.  Patient Reported Blood Pressure:     3.  Patient Reported Pulse:     4.  Patient Reported Weight (Kg):     5.  Patient Reported Blood Glucose (mg/dl):       Medical and social history was reviewed with patient and/or caregiver.     Clinical Assessment     Reviewed and provided basic information on available community  resources for mental health, transportation, wellness resources, and palliative care programs with patient and/or caregiver.     Complex Care Plan     Care plan was discussed and completed today with input from patient and/or caregiver.    Patient Instructions     Instructions were provided via the [facility] patient resources and are available for the patient to view on the patient portal.    Next Steps: follow up     Follow up in about 13 days (around 7/30/2020) for RN Follow up call.    Todays OPCM Self-Management Care Plan was developed with the patients/caregivers input and was based on identified barriers from todays assessment.  Goals were written today with the patient/caregiver and the patient has agreed to work towards these goals to improve his/her overall well-being. Patient verbalized understanding of the care plan, goals, and all of today's instructions. Encouraged patient/caregiver to communicate with his/her physician and health care team about health conditions and the treatment plan.  Provided my contact information today and encouraged patient/caregiver to call me with any questions as needed.

## 2020-07-09 NOTE — PROGRESS NOTES
1st Attempt to complete Social Work Assessment for Outpatient Care Management.  Call to pt to complete assessment. Daughter/caregiver/ANN Castaneda answered stating she is currently at an appointment; pt is home. Tonya will call back after her appointment, about 1 hour.  LCSW will reattempt at a later date

## 2020-07-14 NOTE — PROGRESS NOTES
2nd attempt to complete Social Work Assessment for OPCM; left message requesting a return call on pts phone and daughter's phone. LCSW will send a message to the patient portal with contact information requesting a return call.  OPCM RN notified.

## 2020-07-24 NOTE — PROGRESS NOTES
LCSW sent message to patient portal after 2nd attempt to complete SW Assessment with contact information requesting a return call and pt/daughter has not reached out to this LCSW.  LCSW will close case and notified OPCM RN.

## 2020-07-27 NOTE — TELEPHONE ENCOUNTER
----- Message from Isabel Rboertson MA sent at 7/27/2020  2:38 PM CDT -----  Type: Needs Medical Advice  Who Called:  Carolyn Thibodeaux  Best Call Back Number:   Additional Information: nurse is resending the orders to discontinue home health aid.  He has a paid care giver.  The nurse received an email stating Dr Lakhani is not the patient's provider.

## 2020-07-30 NOTE — PROGRESS NOTES
07/30/20-Attempt follow up with Patient/caregiver  for outpatient case management. No answer. Left message requesting call back. RN OPCM 1'st follow up attempt.   08/06/20-Attempt follow up with Patient/caregiver  for outpatient case management. No answer. Left message requesting call back. RN OPCM 2'nd follow up attempt. Will send message thru patient portal.   08/14/20-Attempt follow up with Patient/caregiver  for outpatient case management. No answer. Left message requesting call back. RN OPCM 3'rd follow up attempt. Will close case and dis-enroll patient from OPCM.

## 2020-08-03 NOTE — PROGRESS NOTES
Subjective:       Patient ID: Micheal Hunt is a 79 y.o. male.    Chief Complaint: Follow-up    79-year-old male, left CVA, right-sided hemiparesis,  Dysphagia, problems with his gait, Major depression with partial results with medications.  Problem with memory, unable to follow complex task.  Is a PEG feeder.  Seizures free for the last 3 months, tonic clolnic, Chronic kidney disease 4, under hemodialysis.  Parathyroid disease secondary to kidney's dysfunction.  Is not able to tolerate Zoloft.    Diabetes  He presents for his follow-up diabetic visit. He has type 2 diabetes mellitus. Onset time: Several years. His disease course has been stable. Hypoglycemia symptoms include confusion. Pertinent negatives for hypoglycemia include no dizziness, headaches or hunger. There are no diabetic associated symptoms. Pertinent negatives for diabetes include no chest pain, no polydipsia, no polyuria and no weakness. Symptoms are improving.     Review of Systems   Constitutional: Negative for activity change and unexpected weight change.   HENT: Positive for trouble swallowing. Negative for hearing loss and rhinorrhea.    Eyes: Positive for visual disturbance. Negative for discharge.   Respiratory: Negative for chest tightness and wheezing.    Cardiovascular: Negative for chest pain and palpitations.   Gastrointestinal: Positive for diarrhea. Negative for blood in stool, constipation and vomiting.   Endocrine: Negative for polydipsia and polyuria.   Genitourinary: Negative for difficulty urinating, hematuria and urgency.   Musculoskeletal: Positive for back pain and gait problem. Negative for arthralgias, joint swelling and neck pain.   Neurological: Negative for dizziness, weakness and headaches.   Psychiatric/Behavioral: Positive for confusion, decreased concentration and dysphoric mood.       Patient Active Problem List   Diagnosis    Osteoarthritis of right knee    Nocturia    Carotid artery stenosis     Essential hypertension    Hyperlipidemia    Vertigo    CAD (coronary artery disease), 2V CABG 2007    Gout, arthritis    Obesity, Class I, BMI 32.8 to 34.3, 32.7, today 30.4    Dizziness    LVH (left ventricular hypertrophy) due to hypertensive disease    Abnormal cardiovascular stress test    GERD (gastroesophageal reflux disease)    Elevated PSA    Acquired hammer toe    Diastolic dysfunction    Abdominal obesity    Back pain, chronic    Glucose intolerance (impaired glucose tolerance)    Obstructive sleep apnea    Anemia of chronic disease    Gastric nodule    Diverticulitis, 2005, 2015    Personal history of colonic polyps    PSA elevation    DDD (degenerative disc disease), lumbar    LV dysfunction, EF 50%, reduced to 34%, now 46%    Other spondylosis, lumbar region    Knee pain    Benign prostatic hyperplasia without lower urinary tract symptoms    Itching, both legs, onset 7/2017    Chronic sinusitis    Sigmoid diverticulitis, 3 episodes, 2005, 2015, 2018    Anemia due to chronic kidney disease, on chronic dialysis    Renal cyst    Unilateral inguinal hernia    Chronic kidney disease, stage 5    Generalized abdominal pain    Aortic stenosis    Secondary hyperparathyroidism    Erythropoietin (EPO) stimulating agent anemia management patient    Enteritis    CRF (chronic renal failure), stage 5    Hyponatremia    Hypocalcemia    Mild malnutrition    Mild asthma with acute exacerbation    Iron deficiency anemia    Dialysis patient, onset 11/2018    Pain of left hand    Immune deficiency disorder    Aortic calcification    Persistent atrial fibrillation    ESRD (end stage renal disease)    NSVT (nonsustained ventricular tachycardia)    Long term (current) use of anticoagulants    Acute on chronic diastolic congestive heart failure    Type 2 diabetes mellitus, without long-term current use of insulin    Hypertension due to end stage renal disease caused by type  2 diabetes mellitus, on dialysis    Pulmonary hypertension    NSTEMI (non-ST elevated myocardial infarction)    ESRD (end stage renal disease) on dialysis    Cerebrovascular accident (CVA) due to occlusion of left posterior cerebral artery    Patent foramen ovale    Cellulitis of right arm    Dysarthria due to acute cerebrovascular accident (CVA)    Encephalopathy    Groin hematoma    Cytotoxic cerebral edema    Umbilical hernia without obstruction and without gangrene    Umbilical hernia    Sprain of right wrist    Right homonymous hemianopsia due to recent cerebral infarction    RABIA (internuclear ophthalmoplegia), left    History of stroke    Chronic right shoulder pain    Morbid (severe) obesity due to excess calories    Hemiplegia and hemiparesis following cerebral infarction affecting right dominant side    COPD with asthma    Oropharyngeal dysphagia    Cognitive communication disorder    Esophageal dysphagia       Objective:      Physical Exam  Vitals signs reviewed.   Constitutional:       General: He is not in acute distress.     Appearance: He is well-developed. He is not diaphoretic.   HENT:      Head: Normocephalic and atraumatic.      Right Ear: External ear normal.      Left Ear: External ear normal.      Nose: Nose normal.      Mouth/Throat:      Pharynx: No oropharyngeal exudate.   Eyes:      General: No scleral icterus.        Right eye: No discharge.         Left eye: No discharge.      Conjunctiva/sclera: Conjunctivae normal.      Pupils: Pupils are equal, round, and reactive to light.   Neck:      Musculoskeletal: Normal range of motion and neck supple.      Thyroid: No thyromegaly.      Vascular: No JVD.      Trachea: No tracheal deviation.   Cardiovascular:      Rate and Rhythm: Normal rate.      Heart sounds: Normal heart sounds. No murmur.   Pulmonary:      Effort: Pulmonary effort is normal. No respiratory distress.      Breath sounds: Normal breath sounds. No wheezing  or rales.   Abdominal:      General: Bowel sounds are normal. There is no distension.      Palpations: Abdomen is soft. There is no mass.      Tenderness: There is no abdominal tenderness. There is no guarding or rebound.   Musculoskeletal:         General: No tenderness.      Comments:        Lymphadenopathy:      Cervical: No cervical adenopathy.   Skin:     General: Skin is warm and dry.      Coloration: Skin is not pale.      Findings: No erythema or rash.   Neurological:      Mental Status: He is alert and oriented to person, place, and time.      Cranial Nerves: No cranial nerve deficit.      Coordination: Coordination normal.   Psychiatric:         Behavior: Behavior normal.         Thought Content: Thought content normal.         Judgment: Judgment normal.         Lab Results   Component Value Date    WBC 6.93 05/08/2019    HGB 9.4 (L) 05/08/2019    HCT 27.8 (L) 05/08/2019     (L) 05/08/2019    CHOL 89 (L) 07/26/2018    TRIG 68 07/26/2018    HDL 29 (L) 07/26/2018    ALT 9 (L) 05/08/2019    AST 15 05/08/2019     05/08/2019    K 3.0 (L) 05/08/2019     05/08/2019    CREATININE 3.6 (H) 05/08/2019    BUN 17 05/08/2019    CO2 30 (H) 05/08/2019    TSH 0.031 (L) 03/20/2019    PSA 5.6 (H) 07/26/2018    INR 1.4 (H) 05/08/2019    HGBA1C 8.3 (H) 03/21/2019     The ASCVD Risk score (Bhavin ANTONIO Jr., et al., 2013) failed to calculate for the following reasons:    The 2013 ASCVD risk score is only valid for ages 40 to 79    The patient has a prior MI or stroke diagnosis    Assessment:       1. CKD (chronic kidney disease), stage IV    2. Mild persistent asthma without complication    3. COPD with asthma    4. Gastroesophageal reflux disease, esophagitis presence not specified    5. CRF (chronic renal failure), stage 5    6. Seizure disorder    7. Essential hypertension    8. Iron deficiency anemia secondary to inadequate dietary iron intake    9. Ischemic encephalopathy    10. 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 Type 2 DM with CKD stage 4 and hypertension    12. Other hyperlipidemia        Plan:       CKD (chronic kidney disease), stage IV  -     vit b cmplx 3-fa-vit c-biotin 1- mg-mg-mcg (NEPHRO-EMMETT RX) 1- mg-mg-mcg Tab; Take 1 tablet by mouth once daily.  Dispense: 90 tablet; Refill: 3    Mild persistent asthma without complication  -     albuterol (PROVENTIL/VENTOLIN HFA) 90 mcg/actuation inhaler; 2 puffs every 4 hours as needed for cough, wheeze, or shortness of breath    COPD with asthma  -     albuterol (PROVENTIL/VENTOLIN HFA) 90 mcg/actuation inhaler; 2 puffs every 4 hours as needed for cough, wheeze, or shortness of breath    Gastroesophageal reflux disease, esophagitis presence not specified    CRF (chronic renal failure), stage 5    Seizure disorder    Essential hypertension  -     carvediloL (COREG) 12.5 MG tablet; Take 6.5 tablets (81.25 mg total) by mouth 2 (two) times daily.  Dispense: 90 tablet; Refill: 4    Iron deficiency anemia secondary to inadequate dietary iron intake    Ischemic encephalopathy  -     atorvastatin (LIPITOR) 40 MG tablet; 1 tablet (40 mg total) by Per G Tube route once daily.  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

## 2020-08-03 NOTE — TELEPHONE ENCOUNTER
Patient's daughter Tonya requesting home health services via [facility]. Orders faxed to [facility] at [phone].           ----- Message from Mery Fung sent at 8/3/2020  3:11 PM CDT -----  Patient's daughter, Tonya, is calling, she wants to know if they can specify which home health agency they want.  She would like [facility] in [place].  Please call her at [phone]. Thank you!

## 2020-08-03 NOTE — PATIENT INSTRUCTIONS

## 2020-08-04 NOTE — TELEPHONE ENCOUNTER
Patient's daughter (Tonya) notified. Verbalized understanding.             ----- Message from Lety Aguilar sent at 8/4/2020  8:08 AM CDT -----  Contact: estiven  with [facility] [phone]    Type: Needs Medical Advice  Who Called:  estiven  with [facility] [phone]  Best Call Back Number:[phone]  Additional Information:   [facility] is  unable to  accept   pt ins  at this  time

## 2020-08-16 PROBLEM — K92.2 GASTROINTESTINAL HEMORRHAGE: Status: ACTIVE | Noted: 2020-01-01

## 2020-08-16 PROBLEM — E87.5 HYPERKALEMIA: Status: ACTIVE | Noted: 2020-01-01

## 2020-08-16 PROBLEM — Z93.1 PEG (PERCUTANEOUS ENDOSCOPIC GASTROSTOMY) STATUS: Status: ACTIVE | Noted: 2020-01-01

## 2020-08-16 NOTE — ASSESSMENT & PLAN NOTE
Patient with stroke in 2018 with residual right-sided weakness.  Hold aspirin given GI bleed.  Hold statin for now.  Fall precautions.

## 2020-08-16 NOTE — ED PROVIDER NOTES
"Encounter Date: 8/16/2020    SCRIBE #1 NOTE: I, Mala Courtney, am scribing for, and in the presence of, Altaf Maynard MD.       History     Chief Complaint   Patient presents with    Rectal Bleeding     " bright red blood " this am        Time seen by provider: 10:06 AM on 08/16/2020    Micheal Hunt is a 79 y.o. male with Hx of dementia, stroke with residual right-sided hemiparesis, seizures, dysphagia with PEG tube, ESRD on hemodialysis (M/W/F), and on Eliquis who presents to the ED via EMS with rectal bleeding since last night. Last dialyzed on Friday. Patient still makes urine. He denies vomiting blood. PSHx of CABG and PEG tube placement. No Hx of DM, liver disease, or EtOH abuse.    Patient is a poor historian and much of history is provided by caregiver.    10:11  Caregiver provides that patient was found with a significant amount of rectal blood with some diarrhea which occurred some time in the middle of the night. Last meal at 8am this morning through PEG tube. No medications taken today. Caregiver states that he may have a history of diverticulitis.    The history is provided by the patient.     Review of patient's allergies indicates:   Allergen Reactions    Ace inhibitors Other (See Comments)     Cough    Angiotensin ii     Arb-angiotensin receptor antagonist Itching    Eplerenone Other (See Comments)     Marked bradycardia, 40, tiredness and weakness      Sulfa (sulfonamide antibiotics) Itching and Swelling     Patient says this was 10 years ago and doesn't remember what happened     Past Medical History:   Diagnosis Date    NICK (acute kidney injury) 7/29/2016    Allergy     Anemia, mild 12/15/2014    Arthritis     Gout    Atrial fibrillation 03/2019    Benign essential HTN 3/27/2012    BMI 29.0-29.9,adult 5/10/2018    BPH (benign prostatic hyperplasia)     BPH (benign prostatic hyperplasia)     CAD (coronary artery disease) 2006    Carotid artery occlusion     60-70% FROYLAN, " LICA < 50%    Chronic kidney disease     due to ibuprofen    Colon polyp     CRF (chronic renal failure), stage 5     Diabetes mellitus     Diverticulosis     ESRD (end stage renal disease) on dialysis     Gastritis     GERD (gastroesophageal reflux disease)     Gout     History of colon polyps 5/3/2018    HTN (hypertension) 3/27/2012    Hyperlipidemia     Hyperlipidemia     LLL pneumonia 2018    LVH (left ventricular hypertrophy)     Mesenteric ischemia     Murmur, cardiac 3/27/2012    GRICELDA (obstructive sleep apnea)     DOES NOT USE A MACHINE    Sinus problem     Stroke 2019    acute left PCA    Syncope and collapse      Past Surgical History:   Procedure Laterality Date    APPENDECTOMY      CARDIAC CATHETERIZATION      LIMA to LAD patent, SVG to RCA    COLONOSCOPY      COLONOSCOPY N/A 5/3/2018    Procedure: COLONOSCOPY;  Surgeon: Messi Harris MD;  [facility];  Service: Endoscopy;  Laterality: N/A;    CORONARY ARTERY BYPASS GRAFT  4/2007    x 2 [place]    JOINT REPLACEMENT      left knee total replacement  X 3    mid leftt finger      from a cactuss    MOLE REMOVAL      rotative cuff      no rotative cuffs on bilat shoulders has pins     SHOULDER SURGERY      shoulder surgery bilat  RIGHT X 4; LEFT X 3     Family History   Problem Relation Age of Onset    Heart disease Mother     Sudden death Father     Cancer Father         advanced lung ca- found after 2 story fall    Stroke Sister     Pneumonia Sister          from PNA    Heart disease Sister     Hypertension Brother     Kidney cancer Neg Hx     Prostate cancer Neg Hx     Urolithiasis Neg Hx     Allergic rhinitis Neg Hx     Allergies Neg Hx     Angioedema Neg Hx     Asthma Neg Hx     Atopy Neg Hx     Eczema Neg Hx     Immunodeficiency Neg Hx     Rhinitis Neg Hx     Urticaria Neg Hx      Social History     Tobacco Use    Smoking status: Never Smoker    Smokeless tobacco:  Never Used   Substance Use Topics    Alcohol use: No     Frequency: Never     Drinks per session: Patient refused     Binge frequency: Patient refused    Drug use: No     Review of Systems   Unable to perform ROS: Dementia       Physical Exam     Initial Vitals [08/16/20 0950]   BP Pulse Resp Temp SpO2   131/88 69 18 97.5 °F (36.4 °C) 98 %      MAP       --         Physical Exam    Nursing note and vitals reviewed.  Constitutional: He appears well-developed and well-nourished. No distress.   Chronically ill-appearing.   HENT:   Head: Normocephalic and atraumatic.   Nose: Nose normal.   Eyes: EOM are normal.   Neck: Neck supple. No tracheal deviation present. No JVD present.   Cardiovascular: Normal rate, regular rhythm, normal heart sounds and intact distal pulses. Exam reveals no gallop and no friction rub.    No murmur heard.  Pulmonary/Chest: Breath sounds normal. No respiratory distress. He has no wheezes. He has no rhonchi. He has no rales.   Abdominal: Soft. Bowel sounds are normal. There is no abdominal tenderness. There is no rebound and no guarding.   PEG tube in place without surrounding erythema, swelling or tenderness.   Genitourinary: Rectum:      No anal fissure, tenderness or external hemorrhoid.      Genitourinary Comments: Scant dry blood around the anus without fissures, external hemorrhoids, induration, or tenderness noted.     Musculoskeletal: Normal range of motion.   Neurological: He is alert.   Baseline right-sided weakness.   Skin: Skin is warm and dry. Capillary refill takes less than 2 seconds. No rash noted.         ED Course   Procedures  Labs Reviewed   CBC W/ AUTO DIFFERENTIAL - Abnormal; Notable for the following components:       Result Value    RBC 3.06 (*)     Hemoglobin 10.1 (*)     Hematocrit 31.0 (*)     Mean Corpuscular Volume 101 (*)     Mean Corpuscular Hemoglobin 33.0 (*)     Immature Granulocytes 1.3 (*)     Immature Grans (Abs) 0.11 (*)     All other components within  normal limits   COMPREHENSIVE METABOLIC PANEL - Abnormal; Notable for the following components:    Potassium 6.1 (*)     CO2 21 (*)     BUN, Bld 62 (*)     Creatinine 6.5 (*)     AST 9 (*)     eGFR if  9 (*)     eGFR if non  7 (*)     All other components within normal limits   PROTIME-INR   SARS-COV-2 RNA AMPLIFICATION, QUAL   TYPE & SCREEN   POCT GLUCOSE   POCT GLUCOSE MONITORING CONTINUOUS          Imaging Results          X-Ray Chest 1 View (Final result)  Result time 08/16/20 12:52:11    Final result by Marie Edgar MD (08/16/20 12:52:11)                 Impression:      Stable cardiomegaly.  No acute pulmonary abnormality.      Electronically signed by: Marie Edgar  Date:    08/16/2020  Time:    12:52             Narrative:    EXAMINATION:  XR CHEST 1 VIEW    CLINICAL HISTORY:  Hyperkalemia    TECHNIQUE:  Single frontal view of the chest was performed.    COMPARISON:  04/02/2019    FINDINGS:  There is moderate enlargement of the cardiac silhouette, unchanged, status post median sternotomy.  The aorta is ectatic and calcified.  The lungs are clear and well inflated.  There is no pleural effusion, pneumothorax or acute bony abnormality.                                 Medical Decision Making:   History:   Old Medical Records: I decided to obtain old medical records.  Independently Interpreted Test(s):   I have ordered and independently interpreted EKG Reading(s) - see summary below       <> Summary of EKG Reading(s): Atrial fibrillation with a slow ventricular response at a rate of 58 bpm. LAD.LVH with QRS widening. No STEMI.  Clinical Tests:   Lab Tests: Ordered and Reviewed  Radiological Study: Ordered and Reviewed  Medical Tests: Ordered and Reviewed  ED Management:  HB and VS reassuring. Pt hyperkalemic and started on hyperkalemic medications. Consulted GI and nephrology. Admit to medicine for emergent dialysis and urgent scope.             Scribe Attestation:    Scribe #1: I performed the above scribed service and the documentation accurately describes the services I performed. I attest to the accuracy of the note.      Attending Attestation:     Physician Attestation for Scribe:    I, Dr. Altaf Maynard, personally performed the services described in this documentation.   All medical record entries made by the scribe were at my direction and in my presence.   I have reviewed the chart and agree that the record is accurate and complete.   Altaf Maynard MD  12:21 AM 08/17/2020     DISCLAIMER: This note was prepared with [employer] voice recognition transcription software. Garbled syntax, mangled pronouns, and other bizarre constructions may be attributed to that software system.          ED Course as of Aug 16 1349   Sun Aug 16, 2020   1002 79-year-old male past medical history of dementia, left CVA with residual right sided hemiparesis, dysphagia and PEG tube, depression, COPD, hypertension, hyperlipidemia, anemia, ESRD on MWF hemodialysis and seizure disorder presents today with hematochezia.  Afebrile.  Vital signs reassuring.  Chronically ill-appearing with vanc to place.    [BD]      ED Course User Index  [BD] Altaf Maynard MD                Clinical Impression:       ICD-10-CM ICD-9-CM   1. Gastrointestinal hemorrhage, unspecified gastrointestinal hemorrhage type  K92.2 578.9   2. Hyperkalemia  E87.5 276.7   3. Hematochezia  K92.1 578.1   4. Gastrointestinal hemorrhage  K92.2 578.9             ED Disposition Condition    Admit                           Altaf Maynard MD  08/17/20 0023

## 2020-08-16 NOTE — ASSESSMENT & PLAN NOTE
Patient with PEG tube.  Per caregiver he eats soft food by mouth during the day and has 12 hr of PEG feeding at night.  Keep NPO and hold peg feedings at this time until GI workup complete

## 2020-08-16 NOTE — CONSULTS
Nephrology Consult Note        Patient Name: Micheal Hunt  MRN: [id number]    Patient Class: Emergency   Admission Date: 8/16/2020  Length of Stay: 0 days  Date of Service: 8/16/2020    Attending Physician: Altaf Maynard MD  Primary Care Provider: Pk Lakhani MD    Reason for Consult: esrd/anemia/htn/shpt/gib/hyperkalemia    SUBJECTIVE:     HPI: 79M with dementia, stroke with residual right-sided hemiparesis, seizures, dysphagia with PEG tube, ESRD on HD MWF, and AF on Eliquis presents with rectal bleeding since last night. Last dialyzed on Friday. Patient still makes urine. He denies vomiting blood. Caregiver provides that patient was found with a significant amount of rectal blood with some diarrhea which occurred some time in the middle of the night.     K is 6.1 in ER, cant give binder, will need urgent HD and then maybe endoscopy.    Past Medical History:   Diagnosis Date    NICK (acute kidney injury) 7/29/2016    Allergy     Anemia, mild 12/15/2014    Arthritis     Gout    Atrial fibrillation 03/2019    Benign essential HTN 3/27/2012    BMI 29.0-29.9,adult 5/10/2018    BPH (benign prostatic hyperplasia)     BPH (benign prostatic hyperplasia)     CAD (coronary artery disease) 2006    Carotid artery occlusion     60-70% FROYLAN, LICA < 50%    Chronic kidney disease     due to ibuprofen    Colon polyp     CRF (chronic renal failure), stage 5     Diabetes mellitus     Diverticulosis     ESRD (end stage renal disease) on dialysis     Gastritis     GERD (gastroesophageal reflux disease)     Gout     History of colon polyps 5/3/2018    HTN (hypertension) 3/27/2012    Hyperlipidemia     Hyperlipidemia     LLL pneumonia 6/14/2018    LVH (left ventricular hypertrophy)     Mesenteric ischemia     Murmur, cardiac 3/27/2012    GRICELDA (obstructive sleep apnea)     DOES NOT USE A MACHINE    Sinus problem     Stroke 03/2019    acute left PCA    Syncope and collapse      Past Surgical  History:   Procedure Laterality Date    APPENDECTOMY      CARDIAC CATHETERIZATION      LIMA to LAD patent, SVG to RCA    COLONOSCOPY      COLONOSCOPY N/A 5/3/2018    Procedure: COLONOSCOPY;  Surgeon: Messi Harris MD;  Location: [facility];  Service: Endoscopy;  Laterality: N/A;    CORONARY ARTERY BYPASS GRAFT  4/2007    x 2 [place]    JOINT REPLACEMENT      left knee total replacement  X 3    mid leftt finger      from a cactuss    MOLE REMOVAL  2016    rotative cuff      no rotative cuffs on bilat shoulders has pins     SHOULDER SURGERY      shoulder surgery bilat  RIGHT X 4; LEFT X 3     Family History   Problem Relation Age of Onset    Heart disease Mother     Sudden death Father     Cancer Father         advanced lung ca- found after 2 story fall    Stroke Sister     Pneumonia Sister          from PNA    Heart disease Sister     Hypertension Brother     Kidney cancer Neg Hx     Prostate cancer Neg Hx     Urolithiasis Neg Hx     Allergic rhinitis Neg Hx     Allergies Neg Hx     Angioedema Neg Hx     Asthma Neg Hx     Atopy Neg Hx     Eczema Neg Hx     Immunodeficiency Neg Hx     Rhinitis Neg Hx     Urticaria Neg Hx      Social History     Tobacco Use    Smoking status: Never Smoker    Smokeless tobacco: Never Used   Substance Use Topics    Alcohol use: No     Frequency: Never     Drinks per session: Patient refused     Binge frequency: Patient refused    Drug use: No       Review of patient's allergies indicates:   Allergen Reactions    Ace inhibitors Other (See Comments)     Cough    Angiotensin ii     Arb-angiotensin receptor antagonist Itching    Eplerenone Other (See Comments)     Marked bradycardia, 40, tiredness and weakness      Sulfa (sulfonamide antibiotics) Itching and Swelling     Patient says this was 10 years ago and doesn't remember what happened       Outpatient meds:  No current facility-administered medications on file prior to encounter.       Current Outpatient Medications on File Prior to Encounter   Medication Sig Dispense Refill    allopurinol (ZYLOPRIM) 100 MG tablet Take 100 mg by mouth once daily.      apixaban (ELIQUIS) 2.5 mg Tab 2.5 mg by FEEDING TUBE route 2 (two) times daily.      aspirin (ECOTRIN) 81 MG EC tablet 81 mg by FEEDING TUBE route once daily.      atorvastatin (LIPITOR) 40 MG tablet 40 mg by FEEDING TUBE route once daily. [facility]      carvediloL (COREG) 6.25 MG tablet       furosemide (LASIX) 40 MG tablet 1 tablet by FEEDING TUBE route every Tuesday, Thursday, Saturday, Sunday.      albuterol (PROVENTIL/VENTOLIN HFA) 90 mcg/actuation inhaler 2 puffs every 4 hours as needed for cough, wheeze, or shortness of breath      allopurinoL (ZYLOPRIM) 100 MG tablet 100 mg by FEEDING TUBE route once daily.      amLODIPine (NORVASC) 10 MG tablet Take 10 mg by mouth once daily.      apixaban (ELIQUIS) 2.5 mg Tab Take 1 tablet (2.5 mg total) by mouth 2 (two) times daily. 60 tablet 4    aspirin (ECOTRIN) 81 MG EC tablet Take 81 mg by mouth once daily.        atorvastatin (LIPITOR) 40 MG tablet 1 tablet (40 mg total) by Per G Tube route once daily. 90 tablet 4    B complex-vitamin C-folic acid 800 mcg Chew 0.8 mg by Per G Tube route once daily.      bacitracin 500 unit/gram ointment Apply topically as needed.      carvediloL (COREG) 12.5 MG tablet Take 6.5 tablets (81.25 mg total) by mouth 2 (two) times daily. 90 tablet 4    finasteride (PROSCAR) 5 mg tablet Take 1 tablet (5 mg total) by mouth once daily. 90 tablet 3    fluticasone (FLONASE) 50 mcg/actuation nasal spray 2 sprays (100 mcg total) by Each Nare route once daily. (Patient not taking: Reported on 6/30/2020) 3 Bottle 3    levETIRAcetam (KEPPRA) 100 mg/mL Soln 7.5 ml by PEG daily 120 mL 5    melatonin 10 mg Tab Take by mouth nightly.      mupirocin (BACTROBAN) 2 % ointment Apply topically 3 (three) times daily. (Patient not taking: Reported on 6/30/2020) 1 Tube 2     nut.tx.imp.renal fxn,lac-reduc (NEPRO CARB STEADY ORAL) 240 mLs by FEEDING TUBE route 2 (two) times daily.      nut.tx.imp.renal fxn,lac-reduc (NEPRO CARB STEADY ORAL) 1,000 mLs by FEEDING TUBE route 2 (two) times daily.      pantoprazole (PROTONIX) 40 MG tablet Take 1 tablet (40 mg total) by mouth once daily. (Patient not taking: Reported on 6/30/2020) 30 tablet 11    polyethylene glycol (GLYCOLAX) 17 gram PwPk Take 17 g by mouth.      pramoxine-hydrocortisone cream Apply topically 3 (three) times daily. (Patient not taking: Reported on 6/30/2020) 57 g 3    senna-docusate 8.6-50 mg (PERICOLACE) 8.6-50 mg per tablet Take 1 tablet by mouth.      tamsulosin (FLOMAX) 0.4 mg Cap Take 1 capsule (0.4 mg total) by mouth once daily. (Patient not taking: Reported on 6/30/2020) 90 capsule 2    tiotropium bromide (SPIRIVA RESPIMAT) 1.25 mcg/actuation Mist Inhale 2.5 mcg into the lungs once daily. Controller (Patient not taking: Reported on 6/30/2020) 4 g 5    vit b cmplx 3-fa-vit c-biotin 1- mg-mg-mcg (NEPHRO-EMMETT RX) 1- mg-mg-mcg Tab Take 1 tablet by mouth once daily. 90 tablet 3       Scheduled meds:   albuterol sulfate  10 mg Nebulization ED 1 Time    calcium gluc in NaCl, iso-osm  1 g Intravenous ED 1 Time    dextrose 50%  25 g Intravenous ED 1 Time    insulin regular  5 Units Intravenous ED 1 Time       Infusions:      PRN meds:      Review of Systems:  Review of Systems   Constitutional: Negative for chills, fever, malaise/fatigue and weight loss.   HENT: Negative for hearing loss and nosebleeds.    Eyes: Negative for blurred vision, double vision and photophobia.   Respiratory: Negative for cough, shortness of breath and wheezing.    Cardiovascular: Negative for chest pain, palpitations and leg swelling.   Gastrointestinal: Negative for abdominal pain, constipation, diarrhea, heartburn, nausea and vomiting.   Genitourinary: Negative for dysuria, frequency and urgency.   Musculoskeletal: Negative  for falls, joint pain and myalgias.   Skin: Negative for itching and rash.   Neurological: Negative for dizziness, speech change, focal weakness, loss of consciousness and headaches.   Endo/Heme/Allergies: Does not bruise/bleed easily.   Psychiatric/Behavioral: Negative for depression and substance abuse. The patient is not nervous/anxious.        OBJECTIVE:     Vital Signs and IO (Last 24H):  Temp:  [97.5 °F (36.4 °C)]   Pulse:  [69-72]   Resp:  [18]   BP: (131-136)/(83-88)   SpO2:  [98 %-100 %]   No intake/output data recorded.    Wt Readings from Last 5 Encounters:   08/16/20 81.6 kg (180 lb)   08/03/20 81.8 kg (180 lb 5.4 oz)   01/27/20 99.3 kg (218 lb 14.7 oz)   10/30/19 99.3 kg (218 lb 14.7 oz)   10/11/19 99.3 kg (219 lb)         Physical Exam:  Physical Exam  Constitutional:       Appearance: He is well-developed. He is not diaphoretic.   HENT:      Head: Normocephalic and atraumatic.   Eyes:      General: No scleral icterus.     Pupils: Pupils are equal, round, and reactive to light.   Neck:      Musculoskeletal: Neck supple.   Cardiovascular:      Rate and Rhythm: Normal rate and regular rhythm.   Pulmonary:      Effort: Pulmonary effort is normal. No respiratory distress.      Breath sounds: No stridor.   Abdominal:      General: There is no distension.      Palpations: Abdomen is soft.   Musculoskeletal: Normal range of motion.         General: No deformity.   Skin:     General: Skin is warm and dry.      Findings: No erythema or rash.   Neurological:      Mental Status: He is alert and oriented to person, place, and time.      Cranial Nerves: No cranial nerve deficit.   Psychiatric:         Behavior: Behavior normal.         Body mass index is 23.75 kg/m².    Laboratory:  Recent Labs   Lab 08/16/20  1051      K 6.1*      CO2 21*   BUN 62*   CREATININE 6.5*   ESTGFRAFRICA 9*   EGFRNONAA 7*   GLU 99       Recent Labs   Lab 08/16/20  1051   CALCIUM 9.1   ALBUMIN 3.5       Recent Labs   Lab  07/26/18  0730 08/14/18  1020 11/12/18  0843   PTH, Intact 14.0 19.2 388.8 H       No results for input(s): POCTGLUCOSE in the last 168 hours.    Recent Labs   Lab 03/11/19  1109 03/21/19  0456   Hemoglobin A1C 7.5 H 8.3 H       Recent Labs   Lab 08/16/20  1051   WBC 8.35   HGB 10.1*   HCT 31.0*      *   MCHC 32.6   MONO 6.3  0.5       Recent Labs   Lab 08/16/20  1051   BILITOT 0.3   PROT 7.1   ALBUMIN 3.5   ALKPHOS 85   ALT 13   AST 9*       Recent Labs   Lab 10/31/18  0905  11/05/18  2235 03/11/19  1823 04/02/19  0739   Color, UA yellow  --  Yellow Yellow Yellow   Appearance, UA  --   --  Clear Clear Clear   pH, UA 6  --  6.0 6.0 >8.0 A   Specific [place], UA  --   --  1.015 1.010 1.010   Spec Grav UA 1.015  --   --   --   --    Protein, UA  --   --  2+ A 2+ A 2+ A   Glucose, UA  --   --  Negative 3+ A Negative   Ketones, UA neg  --  Negative Negative Negative   Urobilinogen, UA neg  --  Negative Negative  --    Bilirubin (UA)  --   --  Negative Negative Negative   Occult Blood UA  --   --  1+ A 1+ A 1+ A   Nitrite, UA neg  --  Negative Negative Negative   RBC, UA  --    < > 1 4 3   WBC, UA  --   --  0 0 2   Bacteria  --   --  None None Occasional   Hyaline Casts, UA  --   --  0 0 0    < > = values in this interval not displayed.       Recent Labs   Lab 03/24/19  1419   POC PH 7.397   POC PCO2 31.7 L   POC HCO3 19.5 L   POC PO2 91   POC SATURATED O2 97   POC BE -5   Sample ARTERIAL       Microbiology Results (last 7 days)     ** No results found for the last 168 hours. **          ASSESSMENT/PLAN:     ESRD on HD MWF via avf  Hyperkalemia  Continue current dialysis prescription.  Next HD now, then per abhinav.  Renal diet - low K, low phos.  No IVs or BP checks on access and/or non-dominant arm.    Anemia of CKD  GIB  Hgb and HCT are acceptable. Monitor.  Will provide NIC and/or IV iron PRN.  Plan for endoscopy by GI.    MBD / Secondary HPT  Ca, phos, PTH and vitamin D levels are acceptable.   Phos  binders, vitamin D analogues and calcimimetics as needed.    HTN  BP seem controlled.   Tolerate asymptomatic HTN up to -160.  Continue home meds.  Low sodium diet.    Thank you for allowing us to participate in the care of your patient!   We will follow the patient and provide recommendations as needed.    Darrel Cary MD    [facility]  [address] [place] [address]  [place] [id number]    [phone] - tel  [phone] - fax    8/16/2020 12:56 PM

## 2020-08-16 NOTE — ASSESSMENT & PLAN NOTE
Receives Monday Wednesday Friday HD.  Has not missed treatment.  Nephrology consulted and will perform HD tonight for hyperkalemia as well as routine HD.  Discussed plan with Dr. Cary

## 2020-08-16 NOTE — ASSESSMENT & PLAN NOTE
Patient with known CAD s/p CABG, which is controlled Will hold aspirin and statin and monitor for S/Sx of angina/ACS. Continue to monitor on telemetry.

## 2020-08-16 NOTE — ASSESSMENT & PLAN NOTE
Patient with dementia.  Dementia is controlled currently. Continue home dementia meds and non-pharmacologic interventions to prevent delirium (No VS between 11PM-5AM, activity during day, opening blinds, providing glasses/hearing aids, and up in chair during daytime). Use PRN anti-psychotics to prevent behavior of self harm during sundowning, and avoid narcotics and benzos unless absolutely necessary. PRN anti-psychotics prescribed to avoid self harm behaviors.

## 2020-08-16 NOTE — HPI
Patient is a 79-year-old male with history of dementia, left-sided stroke with right-sided residual weakness, atrial fibrillation on Eliquis, peg tube, COPD, hypertension, ESRD on Monday Wednesday Friday HD.  Patient is usually cared for by a caregiver during the day 8-8 p.m..  Patient unable to provide much history so history provided in large part by the caregiver.  She states he has been more fatigued over the past 2 days.  He has not complained of abdominal pain.  Has not had any nausea or vomiting.  She left him in bed last night at 8:00 p.m. as usual and when she arrived this morning she found units of dark maroon-colored blood in his bed and diaper.  She reports no further bleeding wants she cleaned month.  He has not received any of his medications today.  Last dose of Eliquis was yesterday.  She reports he has a history of diverticulitis.  He has not missed any dialysis treatments.  She states he eats by mouth during the day and has 12 hr of continuous PEG feeds at night.  Hemoglobin 10.1 in the ED.  Potassium 6.1.  I discussed the case with Nephrology and GI.  Plan to dialyze today and perform EGD in a.m..

## 2020-08-16 NOTE — CONSULTS
CC: blood per rectum    HPI 79 y.o. male with history of HTN, dementia, CVA with right sided hemiparesis, seizures, ESRD on HD (MWF), Afib (on Eliquis) who presented from home after caregiver found patient in bed with dark maroon colored blood per rectum without associated abdominal pain.    H/H on admisson 10.1/31 from 9.4/27.8 in May 2019. INR 1.0. K 6.1, Cr 6.5. Nephrology consulted and planning dialysis due to hyperkalemia. Last dose of Eliquis reported yesterday.     No further episodes of bleeding noted after patient was admitted to ICU.    Colonoscopy performed 2018 with non-bleeding internal hemorrhoids, diverticulosis in sigmoid and descending colon, five small polyps removed in transverse colon. TI normal. Recommended 5 year follow up.     Medical records reviewed. Additional history supplemented by nursing.     Past Medical History:   Diagnosis Date    NICK (acute kidney injury) 7/29/2016    Allergy     Anemia, mild 12/15/2014    Arthritis     Gout    Atrial fibrillation 03/2019    Benign essential HTN 3/27/2012    BMI 29.0-29.9,adult 5/10/2018    BPH (benign prostatic hyperplasia)     BPH (benign prostatic hyperplasia)     CAD (coronary artery disease) 2006    Carotid artery occlusion     60-70% FROYLAN, LICA < 50%    Chronic kidney disease     due to ibuprofen    Colon polyp     CRF (chronic renal failure), stage 5     Diabetes mellitus     Diverticulosis     ESRD (end stage renal disease) on dialysis     Gastritis     GERD (gastroesophageal reflux disease)     Gout     History of colon polyps 5/3/2018    HTN (hypertension) 3/27/2012    Hyperlipidemia     Hyperlipidemia     LLL pneumonia 6/14/2018    LVH (left ventricular hypertrophy)     Mesenteric ischemia     Murmur, cardiac 3/27/2012    GRICELDA (obstructive sleep apnea)     DOES NOT USE A MACHINE    Sinus problem     Stroke 03/2019    acute left PCA    Syncope and collapse      Review of Systems  General ROS: negative for  "chills, fever or weight loss  Cardiovascular ROS: no chest pain or dyspnea on exertion  Gastrointestinal ROS: no abdominal pain, change in bowel habits, + bloody stools    Physical Examination  BP (!) 151/85   Pulse 67   Temp 97.5 °F (36.4 °C)   Resp 10   Ht 6' 1" (1.854 m)   Wt 81.6 kg (180 lb)   SpO2 99%   BMI 23.75 kg/m²   General appearance: elderly male, alert, cooperative, no distress  HENT: Normocephalic, atraumatic, neck symmetrical, no nasal discharge   Lungs: clear to auscultation bilaterally, symmetric chest wall expansion bilaterally  Heart: regular rate and rhythm without rub; no displacement of the PMI   Abdomen: soft, non-tender; bowel sounds normoactive; no organomegaly  Extremities: extremities symmetric; no clubbing, cyanosis, or edema  Neurologic: awake, emotionally labile,     Labs:  Lab Results   Component Value Date    WBC 8.35 08/16/2020    HGB 10.1 (L) 08/16/2020    HCT 31.0 (L) 08/16/2020     (H) 08/16/2020     08/16/2020     CMP  Sodium   Date Value Ref Range Status   08/16/2020 137 136 - 145 mmol/L Final     Potassium   Date Value Ref Range Status   08/16/2020 6.1 (H) 3.5 - 5.1 mmol/L Final     Chloride   Date Value Ref Range Status   08/16/2020 103 95 - 110 mmol/L Final     CO2   Date Value Ref Range Status   08/16/2020 21 (L) 23 - 29 mmol/L Final     Glucose   Date Value Ref Range Status   08/16/2020 99 70 - 110 mg/dL Final     BUN, Bld   Date Value Ref Range Status   08/16/2020 62 (H) 8 - 23 mg/dL Final     Creatinine   Date Value Ref Range Status   08/16/2020 6.5 (H) 0.5 - 1.4 mg/dL Final   08/08/2013 1.5 (H) 0.5 - 1.4 mg/dL Final     Calcium   Date Value Ref Range Status   08/16/2020 9.1 8.7 - 10.5 mg/dL Final   08/08/2013 9.0 8.7 - 10.5 mg/dL Final     Total Protein   Date Value Ref Range Status   08/16/2020 7.1 6.0 - 8.4 g/dL Final     Albumin   Date Value Ref Range Status   08/16/2020 3.5 3.5 - 5.2 g/dL Final   09/12/2013 4.3 3.6 - 5.1 g/dL Final     Comment: "     @ Test Performed By:  [facility] JIGNESH Contreras M.D..,   [address] [place] [address]  [facility] #[id number]     Total Bilirubin   Date Value Ref Range Status   08/16/2020 0.3 0.1 - 1.0 mg/dL Final     Comment:     For infants and newborns, interpretation of results should be based  on gestational age, weight and in agreement with clinical  observations.  Premature Infant recommended reference ranges:  Up to 24 hours.............<8.0 mg/dL  Up to 48 hours............<12.0 mg/dL  3-5 days..................<15.0 mg/dL  6-29 days.................<15.0 mg/dL       Alkaline Phosphatase   Date Value Ref Range Status   08/16/2020 85 55 - 135 U/L Final     AST   Date Value Ref Range Status   08/16/2020 9 (L) 10 - 40 U/L Final     ALT   Date Value Ref Range Status   08/16/2020 13 10 - 44 U/L Final     Anion Gap   Date Value Ref Range Status   08/16/2020 13 8 - 16 mmol/L Final   08/08/2013 11 5 - 15 meq/L Final     eGFR if    Date Value Ref Range Status   08/16/2020 9 (A) >60 mL/min/1.73 m^2 Final     eGFR if non    Date Value Ref Range Status   08/16/2020 7 (A) >60 mL/min/1.73 m^2 Final     Comment:     Calculation used to obtain the estimated glomerular filtration  rate (eGFR) is the CKD-EPI equation.        Imaging:  CXR:    Stable cardiomegaly.  No acute pulmonary abnormality.     Independently reviewed    Assessment:   79 y.o. male with history of HTN, dementia, CVA with right sided hemiparesis, seizures, ESRD on HD (MWF), Afib (on Eliquis) who presented from home after caregiver found patient in bed with dark maroon colored blood per rectum. H/H 10.1/31. Will plan endoscopic evaluation.    Plan:  -NPO  -HD today due to hyperkalemia  -Protonix 40 mg IV BID  -Transfusion per primary team  -Plan EGD tomorrow  -Further recommendations to follow endoscopy      Eben Soto MD  [facility]  [address]  [place] [address]  Office: [phone]  Fax: [phone]

## 2020-08-16 NOTE — SUBJECTIVE & OBJECTIVE
Past Medical History:   Diagnosis Date    NICK (acute kidney injury) 7/29/2016    Allergy     Anemia, mild 12/15/2014    Arthritis     Gout    Atrial fibrillation 03/2019    Benign essential HTN 3/27/2012    BMI 29.0-29.9,adult 5/10/2018    BPH (benign prostatic hyperplasia)     BPH (benign prostatic hyperplasia)     CAD (coronary artery disease) 2006    Carotid artery occlusion     60-70% FROYLAN, LICA < 50%    Chronic kidney disease     due to ibuprofen    Colon polyp     CRF (chronic renal failure), stage 5     Diabetes mellitus     Diverticulosis     ESRD (end stage renal disease) on dialysis     Gastritis     GERD (gastroesophageal reflux disease)     Gout     History of colon polyps 5/3/2018    HTN (hypertension) 3/27/2012    Hyperlipidemia     Hyperlipidemia     LLL pneumonia 6/14/2018    LVH (left ventricular hypertrophy)     Mesenteric ischemia     Murmur, cardiac 3/27/2012    GRICELDA (obstructive sleep apnea)     DOES NOT USE A MACHINE    Sinus problem     Stroke 03/2019    acute left PCA    Syncope and collapse        Past Surgical History:   Procedure Laterality Date    APPENDECTOMY      CARDIAC CATHETERIZATION  2012    LIMA to LAD patent, SVG to RCA    COLONOSCOPY  2011    COLONOSCOPY N/A 5/3/2018    Procedure: COLONOSCOPY;  Surgeon: Messi Harris MD;  Location: [facility];  Service: Endoscopy;  Laterality: N/A;    CORONARY ARTERY BYPASS GRAFT  4/2007    x 2 [place]    JOINT REPLACEMENT      left knee total replacement  X 3    mid leftt finger      from a cactuss    MOLE REMOVAL  2016    rotative cuff      no rotative cuffs on bilat shoulders has pins     SHOULDER SURGERY      shoulder surgery bilat  RIGHT X 4; LEFT X 3       Review of patient's allergies indicates:   Allergen Reactions    Ace inhibitors Other (See Comments)     Cough    Angiotensin ii     Arb-angiotensin receptor antagonist Itching    Eplerenone Other (See Comments)     Marked bradycardia,  40, tiredness and weakness      Sulfa (sulfonamide antibiotics) Itching and Swelling     Patient says this was 10 years ago and doesn't remember what happened       No current facility-administered medications on file prior to encounter.      Current Outpatient Medications on File Prior to Encounter   Medication Sig    allopurinol (ZYLOPRIM) 100 MG tablet Take 100 mg by mouth once daily.    apixaban (ELIQUIS) 2.5 mg Tab 2.5 mg by FEEDING TUBE route 2 (two) times daily.    aspirin (ECOTRIN) 81 MG EC tablet 81 mg by FEEDING TUBE route once daily.    atorvastatin (LIPITOR) 40 MG tablet 40 mg by FEEDING TUBE route [facility] daily. [facility]    carvediloL (COREG) 6.25 MG tablet     furosemide (LASIX) 40 MG tablet 1 tablet by FEEDING TUBE route every Tuesday, Thursday, Saturday, Sunday.    albuterol (PROVENTIL/VENTOLIN HFA) 90 mcg/actuation inhaler 2 puffs every 4 hours as needed for cough, wheeze, or shortness of breath    allopurinoL (ZYLOPRIM) 100 MG tablet 100 mg by FEEDING TUBE route once daily.    amLODIPine (NORVASC) 10 MG tablet Take 10 mg by mouth once daily.    apixaban (ELIQUIS) 2.5 mg Tab Take 1 tablet (2.5 mg total) by mouth 2 (two) times daily.    aspirin (ECOTRIN) 81 MG EC tablet Take 81 mg by mouth once daily.      atorvastatin (LIPITOR) 40 MG tablet 1 tablet (40 mg total) by Per G Tube route once daily.    B complex-vitamin C-folic acid 800 mcg Chew 0.8 mg by Per G Tube route once daily.    bacitracin 500 unit/gram ointment Apply topically as needed.    carvediloL (COREG) 12.5 MG tablet Take 6.5 tablets (81.25 mg total) by mouth 2 (two) times daily.    finasteride (PROSCAR) 5 mg tablet Take 1 tablet (5 mg total) by mouth once daily.    fluticasone (FLONASE) 50 mcg/actuation nasal spray 2 sprays (100 mcg total) by Each Nare route once daily. (Patient not taking: Reported on 6/30/2020)    levETIRAcetam (KEPPRA) 100 mg/mL Soln 7.5 ml by PEG daily    melatonin 10 mg Tab Take by mouth nightly.     mupirocin (BACTROBAN) 2 % ointment Apply topically 3 (three) times daily. (Patient not taking: Reported on 6/30/2020)    nut.tx.imp.renal fxn,lac-reduc (NEPRO CARB STEADY ORAL) 240 mLs by FEEDING TUBE route 2 (two) times daily.    nut.tx.imp.renal fxn,lac-reduc (NEPRO CARB STEADY ORAL) 1,000 mLs by FEEDING TUBE route 2 (two) times daily.    pantoprazole (PROTONIX) 40 MG tablet Take 1 tablet (40 mg total) by mouth once daily. (Patient not taking: Reported on 6/30/2020)    polyethylene glycol (GLYCOLAX) 17 gram PwPk Take 17 g by mouth.    pramoxine-hydrocortisone cream Apply topically 3 (three) times daily. (Patient not taking: Reported on 6/30/2020)    senna-docusate 8.6-50 mg (PERICOLACE) 8.6-50 mg per tablet Take 1 tablet by mouth.    tamsulosin (FLOMAX) 0.4 mg Cap Take 1 capsule (0.4 mg total) by mouth once daily. (Patient not taking: Reported on 6/30/2020)    tiotropium bromide (SPIRIVA RESPIMAT) 1.25 mcg/actuation Mist Inhale 2.5 mcg into the lungs once daily. Controller (Patient not taking: Reported on 6/30/2020)    vit b cmplx 3-fa-vit c-biotin 1- mg-mg-mcg (NEPHRO-EMMETT RX) 1- mg-mg-mcg Tab Take 1 tablet by mouth once daily.     Family History     Problem Relation (Age of Onset)    Cancer Father    Heart disease Mother, Sister    Hypertension Brother    Pneumonia Sister    Stroke Sister    Sudden death Father        Tobacco Use    Smoking status: Never Smoker    Smokeless tobacco: Never Used   Substance and Sexual Activity    Alcohol use: No     Frequency: Never     Drinks per session: Patient refused     Binge frequency: Patient refused    Drug use: No    Sexual activity: Not on file     Review of Systems   Unable to perform ROS: Dementia     Objective:     Vital Signs (Most Recent):  Temp: 97.5 °F (36.4 °C) (08/16/20 0950)  Pulse: 70 (08/16/20 1306)  Resp: 10 (08/16/20 1306)  BP: 136/74 (08/16/20 1301)  SpO2: 96 % (08/16/20 1301) Vital Signs (24h Range):  Temp:  [97.5 °F (36.4 °C)]  97.5 °F (36.4 °C)  Pulse:  [62-72] 70  Resp:  [10-18] 10  SpO2:  [96 %-100 %] 96 %  BP: (131-164)/(74-92) 136/74     Weight: 81.6 kg (180 lb)  Body mass index is 23.75 kg/m².    Physical Exam  Constitutional:       General: He is not in acute distress.     Appearance: He is well-developed.   HENT:      Head: Normocephalic and atraumatic.   Eyes:      Pupils: Pupils are equal, round, and reactive to light.   Cardiovascular:      Rate and Rhythm: Normal rate and regular rhythm.      Heart sounds: No murmur.   Pulmonary:      Effort: Pulmonary effort is normal. No respiratory distress.      Breath sounds: Normal breath sounds. No wheezing or rales.   Abdominal:      General: Bowel sounds are normal. There is no distension.      Palpations: Abdomen is soft.      Tenderness: There is no abdominal tenderness.   Musculoskeletal:      Comments: Right-sided weakness from prior stroke   Skin:     General: Skin is warm and dry.      Findings: No rash.   Neurological:      Mental Status: He is alert and oriented to person, place, and time.      Cranial Nerves: No cranial nerve deficit.   Psychiatric:         Behavior: Behavior normal.      Comments: Pleasantly demented           CRANIAL NERVES     CN III, IV, VI   Pupils are equal, round, and reactive to light.       Significant Labs: All pertinent labs within the past 24 hours have been reviewed.    Significant Imaging: I have reviewed and interpreted all pertinent imaging results/findings within the past 24 hours.

## 2020-08-16 NOTE — ASSESSMENT & PLAN NOTE
Placed on GI bleed pathway.  Discussed with Dr. Soto with GI who is to perform EGD tomorrow.  Keep patient NPO and placed on IV Protonix.  Trend CBC.  Hold aspirin and Eliquis and antihypertensives.  Monitor vital signs closely.  Monitor for further bleeding.  GI to be called if further bleeding or unstable vital signs.

## 2020-08-16 NOTE — PLAN OF CARE
Plan of care reviewed with patient and patients care giver. Patient afib on monitor. Afebrile. No signs of bleeding noted. Dialysis in progress, nurse at bedside. Patient to have lab work drawn after dialysis, may move out to the floor. If so please see sticky note and contact patients caregiver to come stay with patient tonight. Patient becomes very scared very easily and is very tearful, family stated he became like this after his stroke.   Patient can have clear liquids tonight, NPO after midnight for EGD in am.

## 2020-08-16 NOTE — H&P
"[facility] Medicine  History & Physical    Patient Name: Micheal Hunt  MRN: [id number]  Admission Date: 8/16/2020  Attending Physician: Linda Baig MD  Primary Care Provider: Pk Lakhani MD         Patient information was obtained from patient, caregiver / friend and ER records.     Subjective:     Principal Problem:Gastrointestinal hemorrhage    Chief Complaint:   Chief Complaint   Patient presents with    Rectal Bleeding     " bright red blood " this am         HPI: Patient is a 79-year-old male with history of dementia, left-sided stroke with right-sided residual weakness, atrial fibrillation on Eliquis, peg tube, COPD, hypertension, ESRD on Monday Wednesday Friday HD.  Patient is usually cared for by a caregiver during the day 8-8 p.m..  Patient unable to provide much history so history provided in large part by the caregiver.  She states he has been more fatigued over the past 2 days.  He has not complained of abdominal pain.  Has not had any nausea or vomiting.  She left him in bed last night at 8:00 p.m. as usual and when she arrived this morning she found units of dark maroon-colored blood in his bed and diaper.  She reports no further bleeding wants she cleaned month.  He has not received any of his medications today.  Last dose of Eliquis was yesterday.  She reports he has a history of diverticulitis.  He has not missed any dialysis treatments.  She states he eats by mouth during the day and has 12 hr of continuous PEG feeds at night.  Hemoglobin 10.1 in the ED.  Potassium 6.1.  I discussed the case with Nephrology and GI.  Plan to dialyze today and perform EGD in a.m..        Past Medical History:   Diagnosis Date    NICK (acute kidney injury) 7/29/2016    Allergy     Anemia, mild 12/15/2014    Arthritis     Gout    Atrial fibrillation 03/2019    Benign essential HTN 3/27/2012    BMI 29.0-29.9,adult 5/10/2018    BPH (benign prostatic hyperplasia)     BPH " (benign prostatic hyperplasia)     CAD (coronary artery disease) 2006    Carotid artery occlusion     60-70% FROYLAN, LICA < 50%    Chronic kidney disease     due to ibuprofen    Colon polyp     CRF (chronic renal failure), stage 5     Diabetes mellitus     Diverticulosis     ESRD (end stage renal disease) on dialysis     Gastritis     GERD (gastroesophageal reflux disease)     Gout     History of colon polyps 5/3/2018    HTN (hypertension) 3/27/2012    Hyperlipidemia     Hyperlipidemia     LLL pneumonia 6/14/2018    LVH (left ventricular hypertrophy)     Mesenteric ischemia     Murmur, cardiac 3/27/2012    GRICELDA (obstructive sleep apnea)     DOES NOT USE A MACHINE    Sinus problem     Stroke 03/2019    acute left PCA    Syncope and collapse        Past Surgical History:   Procedure Laterality Date    APPENDECTOMY      CARDIAC CATHETERIZATION  2012    LIMA to LAD patent, SVG to RCA    COLONOSCOPY  2011    COLONOSCOPY N/A 5/3/2018    Procedure: COLONOSCOPY;  Surgeon: Messi Harris MD;  Location: [facility];  Service: Endoscopy;  Laterality: N/A;    CORONARY ARTERY BYPASS GRAFT  4/2007    x 2 [place]    JOINT REPLACEMENT      left knee total replacement  X 3    mid leftt finger      from a cactuss    MOLE REMOVAL  2016    rotative cuff      no rotative cuffs on bilat shoulders has pins     SHOULDER SURGERY      shoulder surgery bilat  RIGHT X 4; LEFT X 3       Review of patient's allergies indicates:   Allergen Reactions    Ace inhibitors Other (See Comments)     Cough    Angiotensin ii     Arb-angiotensin receptor antagonist Itching    Eplerenone Other (See Comments)     Marked bradycardia, 40, tiredness and weakness      Sulfa (sulfonamide antibiotics) Itching and Swelling     Patient says this was 10 years ago and doesn't remember what happened       No current facility-administered medications on file prior to encounter.      Current Outpatient Medications on File Prior to  Encounter   Medication Sig    allopurinol (ZYLOPRIM) 100 MG tablet Take 100 mg by mouth once daily.    apixaban (ELIQUIS) 2.5 mg Tab 2.5 mg by FEEDING TUBE route 2 (two) times daily.    aspirin (ECOTRIN) 81 MG EC tablet 81 mg by FEEDING TUBE route once daily.    atorvastatin (LIPITOR) 40 MG tablet 40 mg by FEEDING TUBE route [facility] daily. [facility]    carvediloL (COREG) 6.25 MG tablet     furosemide (LASIX) 40 MG tablet 1 tablet by FEEDING TUBE route every Tuesday, Thursday, Saturday, Sunday.    albuterol (PROVENTIL/VENTOLIN HFA) 90 mcg/actuation inhaler 2 puffs every 4 hours as needed for cough, wheeze, or shortness of breath    allopurinoL (ZYLOPRIM) 100 MG tablet 100 mg by FEEDING TUBE route once daily.    amLODIPine (NORVASC) 10 MG tablet Take 10 mg by mouth once daily.    apixaban (ELIQUIS) 2.5 mg Tab Take 1 tablet (2.5 mg total) by mouth 2 (two) times daily.    aspirin (ECOTRIN) 81 MG EC tablet Take 81 mg by mouth once daily.      atorvastatin (LIPITOR) 40 MG tablet 1 tablet (40 mg total) by Per G Tube route once daily.    B complex-vitamin C-folic acid 800 mcg Chew 0.8 mg by Per G Tube route once daily.    bacitracin 500 unit/gram ointment Apply topically as needed.    carvediloL (COREG) 12.5 MG tablet Take 6.5 tablets (81.25 mg total) by mouth 2 (two) times daily.    finasteride (PROSCAR) 5 mg tablet Take 1 tablet (5 mg total) by mouth once daily.    fluticasone (FLONASE) 50 mcg/actuation nasal spray 2 sprays (100 mcg total) by Each Nare route once daily. (Patient not taking: Reported on 6/30/2020)    levETIRAcetam (KEPPRA) 100 mg/mL Soln 7.5 ml by PEG daily    melatonin 10 mg Tab Take by mouth nightly.    mupirocin (BACTROBAN) 2 % ointment Apply topically 3 (three) times daily. (Patient not taking: Reported on 6/30/2020)    nut.tx.imp.renal fxn,lac-reduc (NEPRO CARB STEADY ORAL) 240 mLs by FEEDING TUBE route 2 (two) times daily.    nut.tx.imp.renal fxn,lac-reduc (NEPRO CARB STEADY ORAL) 1,000  mLs by FEEDING TUBE route 2 (two) times daily.    pantoprazole (PROTONIX) 40 MG tablet Take 1 tablet (40 mg total) by mouth once daily. (Patient not taking: Reported on 6/30/2020)    polyethylene glycol (GLYCOLAX) 17 gram PwPk Take 17 g by mouth.    pramoxine-hydrocortisone cream Apply topically 3 (three) times daily. (Patient not taking: Reported on 6/30/2020)    senna-docusate 8.6-50 mg (PERICOLACE) 8.6-50 mg per tablet Take 1 tablet by mouth.    tamsulosin (FLOMAX) 0.4 mg Cap Take 1 capsule (0.4 mg total) by mouth once daily. (Patient not taking: Reported on 6/30/2020)    tiotropium bromide (SPIRIVA RESPIMAT) 1.25 mcg/actuation Mist Inhale 2.5 mcg into the lungs once daily. Controller (Patient not taking: Reported on 6/30/2020)    vit b cmplx 3-fa-vit c-biotin 1- mg-mg-mcg (NEPHRO-EMMETT RX) 1- mg-mg-mcg Tab Take 1 tablet by mouth once daily.     Family History     Problem Relation (Age of Onset)    Cancer Father    Heart disease Mother, Sister    Hypertension Brother    Pneumonia Sister    Stroke Sister    Sudden death Father        Tobacco Use    Smoking status: Never Smoker    Smokeless tobacco: Never Used   Substance and Sexual Activity    Alcohol use: No     Frequency: Never     Drinks per session: Patient refused     Binge frequency: Patient refused    Drug use: No    Sexual activity: Not on file     Review of Systems   Unable to perform ROS: Dementia     Objective:     Vital Signs (Most Recent):  Temp: 97.5 °F (36.4 °C) (08/16/20 0950)  Pulse: 70 (08/16/20 1306)  Resp: 10 (08/16/20 1306)  BP: 136/74 (08/16/20 1301)  SpO2: 96 % (08/16/20 1301) Vital Signs (24h Range):  Temp:  [97.5 °F (36.4 °C)] 97.5 °F (36.4 °C)  Pulse:  [62-72] 70  Resp:  [10-18] 10  SpO2:  [96 %-100 %] 96 %  BP: (131-164)/(74-92) 136/74     Weight: 81.6 kg (180 lb)  Body mass index is 23.75 kg/m².    Physical Exam  Constitutional:       General: He is not in acute distress.     Appearance: He is well-developed.    HENT:      Head: Normocephalic and atraumatic.   Eyes:      Pupils: Pupils are equal, round, and reactive to light.   Cardiovascular:      Rate and Rhythm: Normal rate and regular rhythm.      Heart sounds: No murmur.   Pulmonary:      Effort: Pulmonary effort is normal. No respiratory distress.      Breath sounds: Normal breath sounds. No wheezing or rales.   Abdominal:      General: Bowel sounds are normal. There is no distension.      Palpations: Abdomen is soft.      Tenderness: There is no abdominal tenderness.   Musculoskeletal:      Comments: Right-sided weakness from prior stroke   Skin:     General: Skin is warm and dry.      Findings: No rash.   Neurological:      Mental Status: He is alert and oriented to person, place, and time.      Cranial Nerves: No cranial nerve deficit.   Psychiatric:         Behavior: Behavior normal.      Comments: Pleasantly demented           CRANIAL NERVES     CN III, IV, VI   Pupils are equal, round, and reactive to light.       Significant Labs: All pertinent labs within the past 24 hours have been reviewed.    Significant Imaging: I have reviewed and interpreted all pertinent imaging results/findings within the past 24 hours.    Assessment/Plan:     * Gastrointestinal hemorrhage  Placed on GI bleed pathway.  Discussed with Dr. Soto with GI who is to perform EGD tomorrow.  Keep patient NPO and placed on IV Protonix.  Trend CBC.  Hold aspirin and Eliquis and antihypertensives.  Monitor vital signs closely.  Monitor for further bleeding.  GI to be called if further bleeding or unstable vital signs.      PEG (percutaneous endoscopic gastrostomy) status  Patient with PEG tube.  Per caregiver he eats soft food by mouth during the day and has 12 hr of PEG feeding at night.  Keep NPO and hold peg feedings at this time until GI workup complete    Hyperkalemia  Discussed with Dr. Cary with Nephrology who is to perform HD tonight      Cognitive communication  disorder  Patient with dementia.  Dementia is controlled currently. Continue home dementia meds and non-pharmacologic interventions to prevent delirium (No VS between 11PM-5AM, activity during day, opening blinds, providing glasses/hearing aids, and up in chair during daytime). Use PRN anti-psychotics to prevent behavior of self harm during sundowning, and avoid narcotics and benzos unless absolutely necessary. PRN anti-psychotics prescribed to avoid self harm behaviors.        Cerebrovascular accident (CVA) due to occlusion of left posterior cerebral artery  Patient with stroke in 2018 with residual right-sided weakness.  Hold aspirin given GI bleed.  Hold statin for now.  Fall precautions.      ESRD (end stage renal disease)  Receives Monday Wednesday Friday HD.  Has not missed treatment.  Nephrology consulted and will perform HD tonight for hyperkalemia as well as routine HD.  Discussed plan with Dr. Cary    CAD (coronary artery disease), 2V CABG 2007  Patient with known CAD s/p CABG, which is controlled Will hold aspirin and statin and monitor for S/Sx of angina/ACS. Continue to monitor on telemetry.         Hyperlipidemia  Hold statin for now      Essential hypertension  Hold home antihypertensives given GI bleed      VTE Risk Mitigation (From admission, onward)    None         Critical care time spent on the evaluation and treatment of severe organ dysfunction, review of pertinent labs and imaging studies, discussions with consulting providers and discussions with patient/family 60 minutes        Linda Baig MD  Department of Hospital Medicine   [facility]

## 2020-08-17 NOTE — PLAN OF CARE
Report given. All questions answered, caregiver at bedside, pt denies pain or nausea, Dr Soto at bedside

## 2020-08-17 NOTE — ASSESSMENT & PLAN NOTE
Placed on GI bleed pathway.  Discussed with Dr. Soto with GI who is to perform EGD today.  Keep patient NPO and placed on IV Protonix.  Trend CBC.  Hold aspirin and Eliquis and antihypertensives.  Monitor vital signs closely.  Monitor for further bleeding.  GI to be called if further bleeding or unstable vital signs.

## 2020-08-17 NOTE — ASSESSMENT & PLAN NOTE
Patient with PEG tube.  Per caregiver he eats soft food by mouth during the day and has 12 hr of PEG feeding at night.  Keep NPO and hold peg feedings at this time until GI workup complete    Will consult with speech therapy for evaluation after GI workup complete

## 2020-08-17 NOTE — PT/OT/SLP EVAL
"Physical Therapy Evaluation    Patient Name:  Micheal Hunt   MRN:  [id number]    Recommendations:     Discharge Recommendations:  home health PT   Discharge Equipment Recommendations: none   Barriers to discharge: None    Assessment:     Micheal Hunt is a 79 y.o. male admitted with a medical diagnosis of Gastrointestinal hemorrhage.  He presents with the following impairments/functional limitations:  weakness, impaired endurance, impaired self care skills, impaired functional mobilty, gait instability, impaired balance, impaired cognition, decreased upper extremity function, decreased lower extremity function, decreased safety awareness . pt seen at ICU- caregiver, Maya at bedside. Pt requiring encouragement to participate with therapy- c/o being cold and tired- nurse Sean stated awaiting EGD. Pt with R paresis UE>LE. Pt completed EOB sitting/brief standing and few steps. Pt with  Daily 12 hr caregiver with HHPT 3x week. Pt to benefit from continued therapies    Rehab Prognosis: Fair; patient would benefit from acute skilled PT services to address these deficits and reach maximum level of function.    Recent Surgery: Procedure(s) (LRB):  EGD (ESOPHAGOGASTRODUODENOSCOPY) (N/A) Day of Surgery    Plan:     During this hospitalization, patient to be seen 6 x/week to address the identified rehab impairments via gait training, therapeutic activities, therapeutic exercises and progress toward the following goals:    · Plan of Care Expires:  09/30/20    Subjective   Pt alert and following directions  Stated " What are you doing to me"-  Pt agreeable once explained purpose of PT  Caregiver Maya assisting with Hx and care. 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  Equipment used at home: walker, rolling, wheelchair, hospital bed.  DME owned (not currently used): single point cane./bedside commode  Upon discharge, patient will have assistance from caregivers/family.    Objective:     Communicated with nurse Sean prior to session.  Patient found HOB elevated with PEG Tube, blood pressure cuff, bed alarm, pulse ox (continuous), telemetry  upon PT entry to room.    General Precautions: Standard, fall, seizure   Orthopedic Precautions:N/A   Braces: N/A     Exams:  · Postural Exam:  Patient presented with the following abnormalities:    · -       Rounded shoulders  · -       Forward head  · -       tall  · RLE ROM: WFL  · RLE Strength: Deficits: 3+/5  · LLE ROM: WFL  · LLE Strength: WFL    Functional Mobility:  · Bed Mobility:     · Rolling Left:  minimum assistance  · Supine to Sit: moderate assistance  · Sit to Supine: moderate assistance  · Transfers:     · Sit to Stand:  moderate assistance and another person for safety with hand-held assist  · Gait: able to take 2 side steps with mod assist and another person for safety    Therapeutic Activities and Exercises:   Patient was educated on the importance of OOB activity and functional mobility to negate negative effects of prolonged bed rest during hospitalization, safe transfers and ambulation, and D/C planning     Pt requiring encouragement/reassurance to mobilize- gets anxious   Caregiver at bedside  Pt participated well with therapies and became more interactive  Back to bed post PT for EGD    AM-PAC 6 CLICK MOBILITY  Total Score:13     Patient left HOB elevated with all lines intact, call button  in reach and caregiver  present.    GOALS:   Multidisciplinary Problems     Physical Therapy Goals        Problem: Physical Therapy Goal    Goal Priority Disciplines Outcome Goal Variances Interventions   Physical Therapy Goal     PT, PT/OT Ongoing, Progressing     Description: Goals to be met by:      Patient will increase functional independence with mobility by performin. Supine to sit with MInimal Assistance  2. Sit to stand transfer with Minimal Assistance  3. Bed to chair transfer with Minimal Assistance  4. Gait  x 50 feet with Minimal Assistance using Rolling Walker.   5. Lower extremity exercise program x20 reps                     History:     Past Medical History:   Diagnosis Date    NICK (acute kidney injury) 2016    Allergy     Anemia, mild 12/15/2014    Arthritis     Gout    Atrial fibrillation 2019    Benign essential HTN 3/27/2012    BMI 29.0-29.9,adult 5/10/2018    BPH (benign prostatic hyperplasia)     BPH (benign prostatic hyperplasia)     CAD (coronary artery disease)     Carotid artery occlusion     60-70% FROYLAN, LICA < 50%    Chronic kidney disease     due to ibuprofen    Colon polyp     CRF (chronic renal failure), stage 5     Diabetes mellitus     Diverticulosis     ESRD (end stage renal disease) on dialysis     Gastritis     GERD (gastroesophageal reflux disease)     Gout     History of colon polyps 5/3/2018    HTN (hypertension) 3/27/2012    Hyperlipidemia     Hyperlipidemia     LLL pneumonia 2018    LVH (left ventricular hypertrophy)     Mesenteric ischemia     Murmur, cardiac 3/27/2012    GRICELDA (obstructive sleep apnea)     DOES NOT USE A MACHINE    Sinus problem     Stroke 2019    acute left PCA    Syncope and collapse        Past Surgical History:   Procedure Laterality Date    APPENDECTOMY      CARDIAC CATHETERIZATION      LIMA to LAD patent, SVG to RCA    COLONOSCOPY      COLONOSCOPY N/A 5/3/2018     Procedure: COLONOSCOPY;  Surgeon: Messi Harris MD;  [facility];  Service: Endoscopy;  Laterality: N/A;    CORONARY ARTERY BYPASS GRAFT  4/2007    x 2 [place]    JOINT REPLACEMENT      left knee total replacement  X 3    mid leftt finger      from a cactuss    MOLE REMOVAL  2016    rotative cuff      no rotative cuffs on bilat shoulders has pins     SHOULDER SURGERY      shoulder surgery bilat  RIGHT X 4; LEFT X 3       Time Tracking:     PT Received On: 08/17/20  PT Start Time: 1026     PT Stop Time: 1045  PT Total Time (min): 19 min     Billable Minutes: Evaluation 8 and Therapeutic Activity 11      Malaika Cerrato, PT  08/17/2020

## 2020-08-17 NOTE — PROGRESS NOTES
"Nephrology Progress Note    Patient Name: Micheal Hunt  MRN: [id number]    Patient Class: IP- Inpatient   Admission Date: 8/16/2020  Length of Stay: 1 days  Date of Service: 8/17/2020    Attending Physician: Linda Baig MD  Primary Care Provider: Pk Lakhani MD    Reason for Consult: esrd/anemia/htn/shpt/gib/hyperkalemia    Chief Complaint   Patient presents with    Rectal Bleeding     " bright red blood " this am        SUBJECTIVE:     HPI: 79M with dementia, stroke with residual right-sided hemiparesis, seizures, dysphagia with PEG tube, ESRD on HD MWF, and AF on Eliquis presents with rectal bleeding since last night. Last dialyzed on Friday. Patient still makes urine. He denies vomiting blood. Caregiver provides that patient was found with a significant amount of rectal blood with some diarrhea which occurred some time in the middle of the night. K is 6.1 in ER, cant give binder, will need urgent HD and then maybe endoscopy.    Interval History:  8/17- had EGD today- reviewed; colonoscopy tomorrow; intermittent hypotension, on RA; no complaints    Outpatient meds:  No current facility-administered medications on file prior to encounter.      Current Outpatient Medications on File Prior to Encounter   Medication Sig Dispense Refill    albuterol (PROVENTIL/VENTOLIN HFA) 90 mcg/actuation inhaler 2 puffs every 4 hours as needed for cough, wheeze, or shortness of breath      allopurinoL (ZYLOPRIM) 100 MG tablet 100 mg by FEEDING TUBE route once daily.      amLODIPine (NORVASC) 10 MG tablet 10 mg by Per G Tube route once daily.       apixaban (ELIQUIS) 2.5 mg Tab 2.5 mg by FEEDING TUBE route 2 (two) times daily.      aspirin (ECOTRIN) 81 MG EC tablet 81 mg by FEEDING TUBE route once daily.      atorvastatin (LIPITOR) 40 MG tablet 1 tablet (40 mg total) by Per G Tube route once daily. 90 tablet 4    B complex-vitamin C-folic acid 800 mcg Chew 0.8 mg by Per G Tube route once daily.      carvediloL " (COREG) 12.5 MG tablet Take 6.5 tablets (81.25 mg total) by mouth 2 (two) times daily. (Patient taking differently: 78.125 mg by Per G Tube route 2 (two) times daily. ) 90 tablet 4    finasteride (PROSCAR) 5 mg tablet Take 1 tablet (5 mg total) by mouth once daily. (Patient taking differently: 5 mg by Per G Tube route once daily. ) 90 tablet 3    fluticasone (FLONASE) 50 mcg/actuation nasal spray 2 sprays (100 mcg total) by Each Nare route once daily. 3 Bottle 3    furosemide (LASIX) 40 MG tablet 1 tablet by FEEDING TUBE route every Tuesday, Thursday, Saturday, Sunday.      levETIRAcetam (KEPPRA) 100 mg/mL Soln 7.5 ml by PEG daily 120 mL 5    bacitracin 500 unit/gram ointment Apply topically as needed.      melatonin 10 mg Tab Take by mouth nightly.      mupirocin (BACTROBAN) 2 % ointment Apply topically 3 (three) times daily. (Patient not taking: Reported on 6/30/2020) 1 Tube 2    nut.tx.imp.renal fxn,lac-reduc (NEPRO CARB STEADY ORAL) 240 mLs by FEEDING TUBE route 2 (two) times daily.      nut.tx.imp.renal fxn,lac-reduc (NEPRO CARB STEADY ORAL) 1,000 mLs by FEEDING TUBE route 2 (two) times daily.      pantoprazole (PROTONIX) 40 MG tablet Take 1 tablet (40 mg total) by mouth once daily. (Patient not taking: Reported on 6/30/2020) 30 tablet 11    polyethylene glycol (GLYCOLAX) 17 gram PwPk Take 17 g by mouth.      pramoxine-hydrocortisone cream Apply topically 3 (three) times daily. (Patient not taking: Reported on 6/30/2020) 57 g 3    senna-docusate 8.6-50 mg (PERICOLACE) 8.6-50 mg per tablet Take 1 tablet by mouth.      tamsulosin (FLOMAX) 0.4 mg Cap Take 1 capsule (0.4 mg total) by mouth once daily. (Patient not taking: Reported on 6/30/2020) 90 capsule 2    tiotropium bromide (SPIRIVA RESPIMAT) 1.25 mcg/actuation Mist Inhale 2.5 mcg into the lungs once daily. Controller (Patient not taking: Reported on 6/30/2020) 4 g 5    vit b cmplx 3-fa-vit c-biotin 1- mg-mg-mcg (NEPHRO-EMMETT RX) 1-  mg-mg-mcg Tab Take 1 tablet by mouth once daily. 90 tablet 3       Scheduled meds:   sodium chloride 0.9%   Intravenous Once    levetiracetam IVPB  750 mg Intravenous Q24H    pantoprazole  40 mg Intravenous BID     Review of Systems:  negative    OBJECTIVE:     Vital Signs and IO (Last 24H):  Temp:  [97.7 °F (36.5 °C)-98.8 °F (37.1 °C)]   Pulse:  [62-93]   Resp:  [10-44]   BP: ()/(34-95)   SpO2:  [96 %-100 %]   I/O last 3 completed shifts:  In: 660 [P.O.:60; Other:600]  Out: 2632 [Other:2632]    Wt Readings from Last 5 Encounters:   08/16/20 81.5 kg (179 lb 10.8 oz)   08/03/20 81.8 kg (180 lb 5.4 oz)   01/27/20 99.3 kg (218 lb 14.7 oz)   10/30/19 99.3 kg (218 lb 14.7 oz)   10/11/19 99.3 kg (219 lb)     Physical Exam:  Constitutional: nad, aao x 3  Heart: rrr, no m/r/g, wwp, no edema  Lungs: ctab, no w/r/r/c, no lb  Abdomen: s/nt/nd, +BS    Body mass index is 23.71 kg/m².    Laboratory:  Recent Labs   Lab 08/16/20  1051 08/17/20  0402    137   K 6.1* 5.3*    100   CO2 21* 24   BUN 62* 47*   CREATININE 6.5* 5.7*   ESTGFRAFRICA 9* 10*   EGFRNONAA 7* 9*   GLU 99 97       Recent Labs   Lab 08/16/20  1051 08/17/20  0402   CALCIUM 9.1 9.2   ALBUMIN 3.5 3.6       Recent Labs   Lab 07/26/18  0730 08/14/18  1020 11/12/18  0843   PTH, Intact 14.0 19.2 388.8 H       Recent Labs   Lab 08/16/20  1317 08/16/20  1438 08/17/20  0055   POCTGLUCOSE 100 148* 103       Recent Labs   Lab 03/11/19  1109 03/21/19  0456   Hemoglobin A1C 7.5 H 8.3 H       Recent Labs   Lab 08/16/20  1826 08/17/20  0402 08/17/20  0735   WBC 9.08 10.59 10.19   HGB 11.2* 9.8* 10.0*   HCT 33.0* 30.7* 30.2*    183 183   MCV 97 98 99*   MCHC 33.9 31.9* 33.1   MONO 5.5  0.5 6.5  0.7 4.8  0.5       Recent Labs   Lab 08/16/20  1051 08/17/20  0402   BILITOT 0.3 0.4   PROT 7.1 7.2   ALBUMIN 3.5 3.6   ALKPHOS 85 87   ALT 13 12   AST 9* 7*       Recent Labs   Lab 10/31/18  0905  11/05/18  2235 03/11/19  1823 04/02/19  0739   Color, UA  yellow  --  Yellow Yellow Yellow   Appearance, UA  --   --  Clear Clear Clear   pH, UA 6  --  6.0 6.0 >8.0 A   Specific [place], UA  --   --  1.015 1.010 1.010   Spec Grav UA 1.015  --   --   --   --    Protein, UA  --   --  2+ A 2+ A 2+ A   Glucose, UA  --   --  Negative 3+ A Negative   Ketones, UA neg  --  Negative Negative Negative   Urobilinogen, UA neg  --  Negative Negative  --    Bilirubin (UA)  --   --  Negative Negative Negative   Occult Blood UA  --   --  1+ A 1+ A 1+ A   Nitrite, UA neg  --  Negative Negative Negative   RBC, UA  --    < > 1 4 3   WBC, UA  --   --  0 0 2   Bacteria  --   --  None None Occasional   Hyaline Casts, UA  --   --  0 0 0    < > = values in this interval not displayed.       Recent Labs   Lab 03/24/19  1419   POC PH 7.397   POC PCO2 31.7 L   POC HCO3 19.5 L   POC PO2 91   POC SATURATED O2 97   POC BE -5   Sample ARTERIAL       Microbiology Results (last 7 days)     ** No results found for the last 168 hours. **          ASSESSMENT/PLAN:     ESRD on HD MWF via AVF  Hyperkalemia  Continue HD MWF.  Ordered 2K Bath.     Anemia of CKD  GIB  Hb is relatively stable.  EGD report noted.  Will get colonoscopy tomorrow.  Ordered NIC 10K units with HD MWF.    MBD / Secondary HPT  Check phos in AM.    HTN  Intermittent low BP noted- UF judiciously in setting of GIB.  Keep norvasc, lasix on hold.   Once hemodynamics stabilize, would resume coreg first.    Updated family.     Thank you for allowing us to participate in the care of your patient!   We will follow the patient and provide recommendations as needed.    Ángela Arcos MD    [facility]  [address] [place] [address]  [place] [address] [phone] - tel  [phone] - fax    8/17/2020 12:56 PM

## 2020-08-17 NOTE — PLAN OF CARE
SW spoke to patient's daughter Tonya Hunt (POA) [phone] via telephone to complete discharge planning assessment.  Verified all demographic information on facesheet is correct.  Verified PCP is Dr. Pk Lakhani.  Verified primary health insurance is Medicare and secondary os [facility].  Patient with home health and listed EUGENE Castaneda gave verbal consent resuming home health with [facility].  Patient has a  Caregiver Maya Pulido that is with him 12 hours a day.  Patient with POA and Living Will.  Patient not on dialysis or medication coumadin.  Patient on Eliquis.  Patient with no 30 day admission.  Patient with no financial issues at this time.  Patient family will provide transportation upon discharge from facility.  Patient dependent with all ADLs, live alone with daughter living in house behind patient, daughter yadi Castaneda request home O2 evaluation, gia button be placed and transportation for patient  upon discharge from facility.       08/17/20 1425   Discharge Assessment   Assessment Type Discharge Planning Assessment   Confirmed/corrected address and phone number on facesheet? Yes   Assessment information obtained from? Other  (Tonya Hunt (daughter))   Prior to hospitilization cognitive status: Unable to Assess   Prior to hospitalization functional status: Completely Dependent   Current cognitive status: Unable to Assess   Current Functional Status: Completely Dependent   Lives With alone   Able to Return to Prior Arrangements yes   Is patient able to care for self after discharge? Yes   Patient's perception of discharge disposition home health   Readmission Within the Last 30 Days no previous admission in last 30 days   Patient currently being followed by outpatient case management? No   Patient currently receives any other outside agency services? No   Equipment Currently Used at Home raised toilet;hospital bed;lift device;wheelchair;bedside commode   Do you have  any problems affording any of your prescribed medications? No   Does the patient have transportation home? Yes   Transportation Anticipated family or friend will provide   Does the patient receive services at the Coumadin Clinic? No  (Eliquis)   Discharge Plan A Home Health   Discharge Plan B Home with family   DME Needed Upon Discharge  other (see comments)  (TBD)   Patient/Family in Agreement with Plan yes

## 2020-08-17 NOTE — ASSESSMENT & PLAN NOTE
Receives Monday Wednesday Friday HD.  Has not missed treatment.  Nephrology consulted and will performed HD 8/16 for hyperkalemia.  Continue HD per nephrologist

## 2020-08-17 NOTE — PT/OT/SLP PROGRESS
Occupational Therapy      Patient Name:  Micheal Hunt   MRN:  [id number]    Patient not seen today secondary to Unavailable (Comment)(EGD). Will follow-up PM.    LUZ Sykes LOTR  8/17/2020

## 2020-08-17 NOTE — SUBJECTIVE & OBJECTIVE
Interval History:  Patient seen and examined.  Pleasantly confused.  One large red bloody bowel movement overnight per nursing notes.  Systolic of 130 on my exam.  Discussed with GI Dr. Soto who is going to do EGD today  Review of Systems   Unable to perform ROS: Dementia     Objective:     Vital Signs (Most Recent):  Temp: 98.5 °F (36.9 °C) (08/17/20 0730)  Pulse: 83 (08/17/20 0930)  Resp: (!) 28 (08/17/20 0930)  BP: (!) 148/62 (08/17/20 0900)  SpO2: 100 % (08/17/20 0930) Vital Signs (24h Range):  Temp:  [97.7 °F (36.5 °C)-98.8 °F (37.1 °C)] 98.5 °F (36.9 °C)  Pulse:  [62-93] 83  Resp:  [10-44] 28  SpO2:  [96 %-100 %] 100 %  BP: ()/(34-95) 148/62     Weight: 81.5 kg (179 lb 10.8 oz)  Body mass index is 23.71 kg/m².    Intake/Output Summary (Last 24 hours) at 8/17/2020 1001  Last data filed at 8/17/2020 0500  Gross per 24 hour   Intake 660 ml   Output 2632 ml   Net -1972 ml      Physical Exam  Constitutional:       General: He is not in acute distress.     Appearance: He is well-developed.   HENT:      Head: Normocephalic and atraumatic.   Eyes:      Pupils: Pupils are equal, round, and reactive to light.   Cardiovascular:      Rate and Rhythm: Normal rate and regular rhythm.      Heart sounds: No murmur.   Pulmonary:      Effort: Pulmonary effort is normal. No respiratory distress.      Breath sounds: Normal breath sounds. No wheezing or rales.   Abdominal:      General: Bowel sounds are normal. There is no distension.      Palpations: Abdomen is soft.      Tenderness: There is no abdominal tenderness.   Musculoskeletal:      Comments: Right-sided weakness from prior stroke   Skin:     General: Skin is warm and dry.      Findings: No rash.   Neurological:      Mental Status: He is alert and oriented to person, place, and time.      Cranial Nerves: No cranial nerve deficit.   Psychiatric:         Behavior: Behavior normal.      Comments: Pleasantly demented         Significant Labs: All pertinent labs  within the past 24 hours have been reviewed.    Significant Imaging: I have reviewed and interpreted all pertinent imaging results/findings within the past 24 hours.

## 2020-08-17 NOTE — NURSING
HD tx complete, tolerated fair  UF off last 15 minutes of tx d/t hypotension and pt c/o stomach hurting, Dr. Cary aware  100mL NS administered  Net UF 2L

## 2020-08-17 NOTE — CONSULTS
"  [facility]  Adult Nutrition  Consult Note    SUMMARY    Intervention: collaboration with care providers     Recommendation:  1) Advance PO diet to goal of DM 2000 kcal, renal diet, texture per SLP if medically able     2) ALSO initiate TF nocturnal Novasource renal @ 60 ml/hr x 12 hr ( 7PM-7AM) + min 30 ml flush q 4 hr for tube patency to meet ~50% of needs   ( 1440 (58% EEN), 65 g protein ( 66% EPN), and 518 ml free water)     3) Add novasource renal BID PO   4) weigh pt weekly    Goals: 1) PO diet and TF initiated by f/u  Nutrition Goal Status: new  Communication of RD Recs: (POC, sticky note, second sign reviewed with MD)    Reason for Assessment    Reason For Assessment: consult  Diagnosis: (GI hemorrhage)  Relevant Medical History: CHF, AFIB, anemia, obesity, HTN, HLD, DM2, ESRD on HD, dementia, stroke, PEG, COPD, CAD  Interdisciplinary Rounds: did not attend    General Information Comments: 80 y/o male admitted s/p 2 days abd. pain. Per MD plan for GI to scope and then SLP consult for diet advancement as pt was eating, "soft foods," during the day PO and receiving TF x 12 hours overnight via PEG. Previous formula not in chart. Unable to perform NPE as pt with other care providers, will perform at f/u. Borderline significant 17% wt loss x 10 months.    Nutrition Discharge Planning: to be determined- DM 2400 kcal, renal diet texture per SLP + nocturnal TF above    Nutrition Risk Screen    Nutrition Risk Screen: dysphagia or difficulty swallowing, tube feeding or parenteral nutrition    Nutrition/Diet History    Spiritual, Cultural Beliefs, Pentecostalism Practices, Values that Affect Care: no  Food Allergies: NKFA  Factors Affecting Nutritional Intake: NPO, altered gastrointestinal function    Anthropometrics    Temp: 98 °F (36.7 °C)  Height Method: Measured(8/3/20 office)  Height: 6' 1"  Height (inches): 73 in  Weight Method: Bed Scale  Weight: 81.5 kg (179 lb 10.8 oz)  Weight (lb): 179.68 " lb  Ideal Body Weight (IBW), Male: 184 lb  % Ideal Body Weight, Male (lb): 97.65 %  BMI (Calculated): 23.7  BMI Grade: 18.5-24.9 - normal  Usual Body Weight (UBW), k.8 kg  99 kg 10/30/19       Lab/Procedures/Meds    Pertinent Labs Reviewed: reviewed  BMP  Lab Results   Component Value Date     2020    K 5.3 (H) 2020     2020    CO2 24 2020    BUN 47 (H) 2020    CREATININE 5.7 (H) 2020    CALCIUM 9.2 2020    ANIONGAP 13 2020    ESTGFRAFRICA 10 (A) 2020    EGFRNONAA 9 (A) 2020     Lab Results   Component Value Date    IRON 31 (L) 2019    TIBC 295 2019    FERRITIN 469 (H) 2019     Lab Results   Component Value Date    CALCIUM 9.2 2020    PHOS 3.3 2019     POCT Glucose   Date Value Ref Range Status   2020 117 (H) 70 - 110 mg/dL Final   2020 103 70 - 110 mg/dL Final   2020 148 (H) 70 - 110 mg/dL Final   2020 100 70 - 110 mg/dL Final     Lab Results   Component Value Date    HGBA1C 8.3 (H) 2019     Pertinent Medications Reviewed: reviewed  Pertinent Medications Comments: insulin, zofran    Estimated/Assessed Needs    Weight Used For Calorie Calculations: 81.5 kg (179 lb 10.8 oz)  Energy Calorie Requirements (kcal): 30-35 kcal/kg ( HD) = 0436-5718  Energy Need Method: Kcal/kg  Protein Requirements: 1.2-1.5 g protein/kg ( HD/ critical care) =  g protein  Weight Used For Protein Calculations: 81.5 kg (179 lb 10.8 oz)  Fluid Requirements (mL): urine output + 1000 ml  Estimated Fluid Requirement Method: other (see comments)  CHO Requirement: 275-306 g      Nutrition Prescription Ordered    Current Diet Order: NPO x 1 day    Evaluation of Received Nutrient/Fluid Intake    Energy Calories Required: not meeting needs  Protein Required: not meeting needs  Fluid Required: not meeting needs  Tolerance: other (see comments)(NPO)  % Intake of Estimated Energy Needs: 0%  % Meal Intake:  0%    Nutrition Risk    Level of Risk/Frequency of Follow-up: moderate 2 x weekly    Assessment and Plan    Inadequate energy intake  R/t NPO   As evidenced by PO intakes < 50% EEN x 1 day  Intervention: above  new     Monitor and Evaluation    Food and Nutrient Intake: energy intake  Food and Nutrient Adminstration: diet order, enteral and parenteral nutrition administration  Anthropometric Measurements: weight  Biochemical Data, Medical Tests and Procedures: electrolyte and renal panel, gastrointestinal profile, glucose/endocrine profile  Nutrition-Focused Physical Findings: overall appearance, extremities, muscles and bones     Malnutrition Assessment     Skin (Micronutrient): (Patrick = 15)   Micronutrient Evaluation: suspected deficiency, suspected toxicity  Micronutrient Evaluation Comments: check iron, K toxicity               Edema (Fluid Accumulation): 0-->no edema present             Nutrition Follow-Up    RD Follow-up?: Yes

## 2020-08-17 NOTE — PLAN OF CARE
Problem: Physical Therapy Goal  Goal: Physical Therapy Goal  Description: Goals to be met by:      Patient will increase functional independence with mobility by performin. Supine to sit with MInimal Assistance  2. Sit to stand transfer with Minimal Assistance  3. Bed to chair transfer with Minimal Assistance  4. Gait  x 50 feet with Minimal Assistance using Rolling Walker.   5. Lower extremity exercise program x20 reps    Outcome: Ongoing, Progressing   PT eval and treat. Pt alert and following directions. Sat EOB and able to stand - took 2 side steps with mod assist x2. Back to bed for EGD. Recommend continue HHPT/ has caregiver 12 hrs/day

## 2020-08-17 NOTE — PLAN OF CARE
Intervention: collaboration with care providers     Recommendation:  1) Advance PO diet to goal of DM 2000 kcal, renal diet, texture per SLP if medically able     2) ALSO initiate TF nocturnal Novasource renal @ 60 ml/hr x 12 hr ( 7PM-7AM) + min 30 ml flush q 4 hr for tube patency to meet ~50% of needs   ( 1440 (58% EEN), 65 g protein ( 66% EPN), and 518 ml free water)     3) Add novasource renal BID PO   4) weigh pt weekly    Goals: 1) PO diet and TF initiated by f/u  Nutrition Goal Status: new  Communication of RD Recs: (POC, sticky note, second sign reviewed with MD)

## 2020-08-17 NOTE — TRANSFER OF CARE
"Anesthesia Transfer of Care Note    Patient: Micheal Hunt    Procedure(s) Performed: Procedure(s) (LRB):  EGD (ESOPHAGOGASTRODUODENOSCOPY) (N/A)    Patient location: ICU    Anesthesia Type: general    Transport from OR: Transported from OR on 2-3 L/min O2 by NC with adequate spontaneous ventilation. Continuous ECG monitoring in transport. Continuous SpO2 monitoring in transport    Post pain: adequate analgesia    Post assessment: no apparent anesthetic complications and tolerated procedure well    Post vital signs: stable    Level of consciousness: awake, alert and oriented    Nausea/Vomiting: no nausea/vomiting    Complications: none    Transfer of care protocol was followed      Last vitals:   Visit Vitals  BP (!) 146/82   Pulse 84   Temp 36.7 °C (98 °F)   Resp 16   Ht 6' 1" (1.854 m)   Wt 81.5 kg (179 lb 10.8 oz)   SpO2 97%   BMI 23.71 kg/m²     "

## 2020-08-17 NOTE — PLAN OF CARE
EGD:  -Normal esophagus  -Gastritis  -Submucosal nodule in gastric antrum; biopsied  -Old hemoclip present in gastric body  -Balloon gastrostomy in gastric body  -Normal examined duodenum    Recommendations:  78 y/o male with history of HTN, dementia, CVA with right sided hemiparesis, seizures, ESRD on HD (MWF), Afib (on Eliquis) who presented from home with hematochezia; upper endoscopy without stigmata of recent blood loss                         -Clear liquid diet  -Plan to schedule repeat EUS as outpatient for evaluation of gastric antral nodule  -Colonoscopy tomorrow for evaluation of hematochezia  -Golytely split prep to start this evening  -Dr. Harris to take over tomorrow    Eben Soto MD  [facility]  [address] [place] [address]  Office: [phone]  Fax: [phone]     Statement Selected

## 2020-08-17 NOTE — PLAN OF CARE
Patient vitals stable. EGD performed today with negative results. Colonoscopy planned for tomorrow. Bowel prep to begin tonight at 18:00. Dialysis currently ongoing, dialysis nurse at bedside. Safety maintained throughout shift. Bed alarm on . Daughter updated with plan of care.

## 2020-08-17 NOTE — PLAN OF CARE
Pt remained free of falls or injuries this shift.  Pt became increasingly confused as the shift went on, asking for people who weren't there and saying things that didn't make sense.  In talking to the family this is noted to be his baseline.  Pt completed dialysis and MD Dash was made aware of post vitals.  Pt has one large dark red bloody bowel movement but has remained asymptomatic and H&H held.  Pt kept safe throughout the shift, family updated as needed. VSS afebrile, will continue to monitor.

## 2020-08-17 NOTE — PROGRESS NOTES
[facility] Hospital Medicine  Progress Note    Patient Name: Micheal Hunt  MRN: [id number]  Patient Class: IP- Inpatient   Admission Date: 8/16/2020  Length of Stay: 1 days  Attending Physician: Linda Baig MD  Primary Care Provider: Pk Lakhani MD        Subjective:     Principal Problem:Gastrointestinal hemorrhage        HPI:  Patient is a 79-year-old male with history of dementia, left-sided stroke with right-sided residual weakness, atrial fibrillation on Eliquis, peg tube, COPD, hypertension, ESRD on Monday Wednesday Friday HD.  Patient is usually cared for by a caregiver during the day 8-8 p.m..  Patient unable to provide much history so history provided in large part by the caregiver.  She states he has been more fatigued over the past 2 days.  He has not complained of abdominal pain.  Has not had any nausea or vomiting.  She left him in bed last night at 8:00 p.m. as usual and when she arrived this morning she found units of dark maroon-colored blood in his bed and diaper.  She reports no further bleeding wants she cleaned month.  He has not received any of his medications today.  Last dose of Eliquis was yesterday.  She reports he has a history of diverticulitis.  He has not missed any dialysis treatments.  She states he eats by mouth during the day and has 12 hr of continuous PEG feeds at night.  Hemoglobin 10.1 in the ED.  Potassium 6.1.  I discussed the case with Nephrology and GI.  Plan to dialyze today and perform EGD in a.m..        Overview/Hospital Course:  No notes on file    Interval History:  Patient seen and examined.  Pleasantly confused.  One large red bloody bowel movement overnight per nursing notes.  Systolic of 130 on my exam.  Discussed with GI Dr. Soto who is going to do EGD today  Review of Systems   Unable to perform ROS: Dementia     Objective:     Vital Signs (Most Recent):  Temp: 98.5 °F (36.9 °C) (08/17/20 0730)  Pulse: 83 (08/17/20  0930)  Resp: (!) 28 (08/17/20 0930)  BP: (!) 148/62 (08/17/20 0900)  SpO2: 100 % (08/17/20 0930) Vital Signs (24h Range):  Temp:  [97.7 °F (36.5 °C)-98.8 °F (37.1 °C)] 98.5 °F (36.9 °C)  Pulse:  [62-93] 83  Resp:  [10-44] 28  SpO2:  [96 %-100 %] 100 %  BP: ()/(34-95) 148/62     Weight: 81.5 kg (179 lb 10.8 oz)  Body mass index is 23.71 kg/m².    Intake/Output Summary (Last 24 hours) at 8/17/2020 1001  Last data filed at 8/17/2020 0500  Gross per 24 hour   Intake 660 ml   Output 2632 ml   Net -1972 ml      Physical Exam  Constitutional:       General: He is not in acute distress.     Appearance: He is well-developed.   HENT:      Head: Normocephalic and atraumatic.   Eyes:      Pupils: Pupils are equal, round, and reactive to light.   Cardiovascular:      Rate and Rhythm: Normal rate and regular rhythm.      Heart sounds: No murmur.   Pulmonary:      Effort: Pulmonary effort is normal. No respiratory distress.      Breath sounds: Normal breath sounds. No wheezing or rales.   Abdominal:      General: Bowel sounds are normal. There is no distension.      Palpations: Abdomen is soft.      Tenderness: There is no abdominal tenderness.   Musculoskeletal:      Comments: Right-sided weakness from prior stroke   Skin:     General: Skin is warm and dry.      Findings: No rash.   Neurological:      Mental Status: He is alert and oriented to person, place, and time.      Cranial Nerves: No cranial nerve deficit.   Psychiatric:         Behavior: Behavior normal.      Comments: Pleasantly demented         Significant Labs: All pertinent labs within the past 24 hours have been reviewed.    Significant Imaging: I have reviewed and interpreted all pertinent imaging results/findings within the past 24 hours.      Assessment/Plan:      * Gastrointestinal hemorrhage  Placed on GI bleed pathway.  Discussed with Dr. Soto with GI who is to perform EGD today.  Keep patient NPO and placed on IV Protonix.  Trend CBC.  Hold  aspirin and Eliquis and antihypertensives.  Monitor vital signs closely.  Monitor for further bleeding.  GI to be called if further bleeding or unstable vital signs.      PEG (percutaneous endoscopic gastrostomy) status  Patient with PEG tube.  Per caregiver he eats soft food by mouth during the day and has 12 hr of PEG feeding at night.  Keep NPO and hold peg feedings at this time until GI workup complete    Will consult with speech therapy for evaluation after GI workup complete    Hyperkalemia  Improved after HD 8/16.  Still slightly elevated.  Further dialysis per  nephrology      Cognitive communication disorder  Patient with dementia.  Dementia is controlled currently. Continue home dementia meds and non-pharmacologic interventions to prevent delirium (No VS between 11PM-5AM, activity during day, opening blinds, providing glasses/hearing aids, and up in chair during daytime). Use PRN anti-psychotics to prevent behavior of self harm during sundowning, and avoid narcotics and benzos unless absolutely necessary. PRN anti-psychotics prescribed to avoid self harm behaviors.        Cerebrovascular accident (CVA) due to occlusion of left posterior cerebral artery  Patient with stroke in 2018 with residual right-sided weakness.  Hold aspirin given GI bleed.  Hold statin for now.  Fall precautions.      ESRD (end stage renal disease)  Receives Monday Wednesday Friday HD.  Has not missed treatment.  Nephrology consulted and will performed HD 8/16 for hyperkalemia.  Continue HD per nephrologist    CAD (coronary artery disease), 2V CABG 2007  Patient with known CAD s/p CABG, which is controlled Will hold aspirin and statin and monitor for S/Sx of angina/ACS. Continue to monitor on telemetry.         Hyperlipidemia  Hold statin for now      Essential hypertension  Hold home antihypertensives given GI bleed        VTE Risk Mitigation (From admission, onward)         Ordered     IP VTE HIGH RISK PATIENT  Once      08/16/20  1606     Place sequential compression device  Until discontinued      08/16/20 1606                Discharge Planning   MARKELL:      Code Status: Full Code   Is the patient medically ready for discharge?:     Reason for patient still in hospital (select all that apply): Patient trending condition               Critical care time spent on the evaluation and treatment of severe organ dysfunction, review of pertinent labs and imaging studies, discussions with consulting providers and discussions with patient/family: 35 minutes.    Plan of care discussed with GI.  Will follow-up results of EGD today      Linda Baig MD  Department of Hospital Medicine   [facility]

## 2020-08-17 NOTE — ANESTHESIA PREPROCEDURE EVALUATION
08/17/2020  Micheal Hunt is a 79 y.o., male.    Anesthesia Evaluation    I have reviewed the Patient Summary Reports.    I have reviewed the Nursing Notes. I have reviewed the NPO Status.   I have reviewed the Medications.     Review of Systems  Anesthesia Hx:  No problems with previous Anesthesia    Social:  Non-Smoker    Cardiovascular:   Hypertension, well controlled Past MI CAD asymptomatic CABG/stent  CHF CORONARY ARTERY BYPASS GRAFT Carotoid Artery Disease    Pulmonary:   Pneumonia COPD, mild Asthma Sleep Apnea    Renal/:   Chronic Renal Disease    Hepatic/GI:   GERD    Musculoskeletal:   Arthritis     Neurological:   CVA    Endocrine:   Diabetes, poorly controlled, type 2        Physical Exam  General:  Well nourished    Airway/Jaw/Neck:  Airway Findings: Mouth Opening: Normal Tongue: Normal  General Airway Assessment: Adult, Good  Mallampati: II  Improves to II with phonation.  TM Distance: 4-6 cm      Dental:  Dental Findings: In tact   Chest/Lungs:  Chest/Lungs Findings: Clear to auscultation, Normal Respiratory Rate     Heart/Vascular:  Heart Findings: Rate: Normal  Rhythm: Regular Rhythm  Sounds: Normal  Heart murmur: negative       Mental Status:  Mental Status Findings:  Cooperative, Alert and Oriented         Anesthesia Plan  Type of Anesthesia, risks & benefits discussed:  Anesthesia Type:  general  Patient's Preference:   Intra-op Monitoring Plan:   Intra-op Monitoring Plan Comments:   Post Op Pain Control Plan:   Post Op Pain Control Plan Comments:   Induction:   IV  Beta Blocker:  Patient is on a Beta-Blocker and has received one dose within the past 24 hours (No further documentation required).       Informed Consent: Patient understands risks and agrees with Anesthesia plan.  Questions answered. Anesthesia consent signed with patient.  ASA Score: 4     Day of Surgery Review of  History & Physical: I have interviewed and examined the patient. I have reviewed the patient's H&P dated:  There are no significant changes.  H&P update referred to the surgeon.  H&P completed by Anesthesiologist.       Ready For Surgery From Anesthesia Perspective.

## 2020-08-17 NOTE — [employer] PATIENT INSTRUCTIONS
Discharge Summary/Instructions after an Endoscopic Procedure  Patient Name: Micheal Hunt  Patient MRN: [id number]  Patient YOB: 1940 Monday, August 17, 2020  Eben Soto MD  RESTRICTIONS:  During your procedure today, you received medications for sedation.  These   medications may affect your judgment, balance and coordination.  Therefore,   for 24 hours, you have the following restrictions:   - DO NOT drive a car, operate machinery, make legal/financial decisions,   sign important papers or drink alcohol.    ACTIVITY:  Today: no heavy lifting, straining or running due to procedural   sedation/anesthesia.  The following day: return to full activity including work.  DIET:  Eat and drink normally unless instructed otherwise.     TREATMENT FOR COMMON SIDE EFFECTS:  - Mild abdominal pain, nausea, belching, bloating or excessive gas:  rest,   eat lightly and use a heating pad.  - Sore Throat: treat with throat lozenges and/or gargle with warm salt   water.  - Because air was used during the procedure, expelling large amounts of air   from your rectum or belching is normal.  - If a bowel prep was taken, you may not have a bowel movement for 1-3 days.    This is normal.  SYMPTOMS TO WATCH FOR AND REPORT TO YOUR PHYSICIAN:  1. Abdominal pain or bloating, other than gas cramps.  2. Chest pain.  3. Back pain.  4. Signs of infection such as: chills or fever occurring within 24 hours   after the procedure.  5. Rectal bleeding, which would show as bright red, maroon, or black stools.   (A tablespoon of blood from the rectum is not serious, especially if   hemorrhoids are present.)  6. Vomiting.  7. Weakness or dizziness.  GO DIRECTLY TO THE NEAREST EMERGENCY ROOM IF YOU HAVE ANY OF THE FOLLOWING:      Difficulty breathing              Chills and/or fever over 101 F   Persistent vomiting and/or vomiting blood   Severe abdominal pain   Severe chest pain   Black, tarry stools   Bleeding- more than one  tablespoon   Any other symptom or condition that you feel may need urgent attention  Your doctor recommends these additional instructions:  If any biopsies were taken, your doctors clinic will contact you in 1 to 2   weeks with any results.  - [facility] for [facility] care.   - Clear liquid diet.   - Continue present medications.   - Await pathology results.  - Refer for EUS as outpatient to evaluate gastric antral nodule as   recommended previously  - Colonoscopy tomorrow for evaluation of hematochezia  - Clear liquid diet  - Golytely split prep starting this evening  For questions, problems or results please call your physician - Eben Soto MD at Work:  [phone].  [facility], EMERGENCY ROOM PHONE NUMBER: [phone]  IF A COMPLICATION OR EMERGENCY SITUATION ARISES AND YOU ARE UNABLE TO REACH   YOUR PHYSICIAN - GO DIRECTLY TO THE EMERGENCY ROOM.  Eben Soto MD  8/17/2020 12:12:29 PM  This report has been verified and signed electronically.  [employer]

## 2020-08-18 NOTE — NURSING
Report called and given to nurse assuming care. Patient transferred via ICU bed to 223. Patient transferred to his new bed once in 223 and ICU bed returned to room 516. Remote tele monitor placed on patient. Patient's daughter Tonya called and updated on patient's plan of care and transfer. All patient belongings including cell phone, glasses, and binder brought to new room.

## 2020-08-18 NOTE — PROGRESS NOTES
"Nephrology Progress Note    Patient Name: Micheal Hunt  MRN: [id number]    Patient Class: IP- Inpatient   Admission Date: 8/16/2020  Length of Stay: 2 days  Date of Service: 8/18/2020    Attending Physician: Linda Baig MD  Primary Care Provider: Pk Lakhani MD    Reason for Consult: esrd/anemia/htn/shpt/gib/hyperkalemia    Chief Complaint   Patient presents with    Rectal Bleeding     " bright red blood " this am        SUBJECTIVE:     HPI: 79M with dementia, stroke with residual right-sided hemiparesis, seizures, dysphagia with PEG tube, ESRD on HD MWF, and AF on Eliquis presents with rectal bleeding since last night. Last dialyzed on Friday. Patient still makes urine. He denies vomiting blood. Caregiver provides that patient was found with a significant amount of rectal blood with some diarrhea which occurred some time in the middle of the night. K is 6.1 in ER, cant give binder, will need urgent HD and then maybe endoscopy.    Interval History:  8/17- had EGD today- reviewed; colonoscopy tomorrow; intermittent hypotension, on RA; no complaints  8/18- colonoscopy today, intermittent hypotension, on RA, did 1L UF yest     Outpatient meds:  No current facility-administered medications on file prior to encounter.      Current Outpatient Medications on File Prior to Encounter   Medication Sig Dispense Refill    albuterol (PROVENTIL/VENTOLIN HFA) 90 mcg/actuation inhaler 2 puffs every 4 hours as needed for cough, wheeze, or shortness of breath      allopurinoL (ZYLOPRIM) 100 MG tablet 100 mg by FEEDING TUBE route once daily.      amLODIPine (NORVASC) 10 MG tablet 10 mg by Per G Tube route once daily.       apixaban (ELIQUIS) 2.5 mg Tab 2.5 mg by FEEDING TUBE route 2 (two) times daily.      aspirin (ECOTRIN) 81 MG EC tablet 81 mg by FEEDING TUBE route once daily.      atorvastatin (LIPITOR) 40 MG tablet 1 tablet (40 mg total) by Per G Tube route once daily. 90 tablet 4    B complex-vitamin C-folic " acid 800 mcg Chew 0.8 mg by Per G Tube route once daily.      carvediloL (COREG) 12.5 MG tablet Take 6.5 tablets (81.25 mg total) by mouth 2 (two) times daily. (Patient taking differently: 78.125 mg by Per G Tube route 2 (two) times daily. ) 90 tablet 4    finasteride (PROSCAR) 5 mg tablet Take 1 tablet (5 mg total) by mouth once daily. (Patient taking differently: 5 mg by Per G Tube route once daily. ) 90 tablet 3    fluticasone (FLONASE) 50 mcg/actuation nasal spray 2 sprays (100 mcg total) by Each Nare route once daily. 3 Bottle 3    furosemide (LASIX) 40 MG tablet 1 tablet by FEEDING TUBE route every Tuesday, Thursday, Saturday, Sunday.      levETIRAcetam (KEPPRA) 100 mg/mL Soln 7.5 ml by PEG daily 120 mL 5    bacitracin 500 unit/gram ointment Apply topically as needed.      melatonin 10 mg Tab Take by mouth nightly.      mupirocin (BACTROBAN) 2 % ointment Apply topically 3 (three) times daily. (Patient not taking: Reported on 6/30/2020) 1 Tube 2    nut.tx.imp.renal fxn,lac-reduc (NEPRO CARB STEADY ORAL) 240 mLs by FEEDING TUBE route 2 (two) times daily.      nut.tx.imp.renal fxn,lac-reduc (NEPRO CARB STEADY ORAL) 1,000 mLs by FEEDING TUBE route 2 (two) times daily.      pantoprazole (PROTONIX) 40 MG tablet Take 1 tablet (40 mg total) by mouth once daily. (Patient not taking: Reported on 6/30/2020) 30 tablet 11    polyethylene glycol (GLYCOLAX) 17 gram PwPk Take 17 g by mouth.      pramoxine-hydrocortisone cream Apply topically 3 (three) times daily. (Patient not taking: Reported on 6/30/2020) 57 g 3    senna-docusate 8.6-50 mg (PERICOLACE) 8.6-50 mg per tablet Take 1 tablet by mouth.      tamsulosin (FLOMAX) 0.4 mg Cap Take 1 capsule (0.4 mg total) by mouth once daily. (Patient not taking: Reported on 6/30/2020) 90 capsule 2    tiotropium bromide (SPIRIVA RESPIMAT) 1.25 mcg/actuation Mist Inhale 2.5 mcg into the lungs once daily. Controller (Patient not taking: Reported on 6/30/2020) 4 g 5     vit b cmplx 3-fa-vit c-biotin 1- mg-mg-mcg (NEPHRO-EMMETT RX) 1- mg-mg-mcg Tab Take 1 tablet by mouth once daily. 90 tablet 3       Scheduled meds:   epoetin kitty-epbx  10,000 Units Subcutaneous Every Mon, Wed, Fri    levetiracetam IVPB  750 mg Intravenous Q24H    pantoprazole  40 mg Intravenous BID     Review of Systems:  sedated    OBJECTIVE:     Vital Signs and IO (Last 24H):  Temp:  [98 °F (36.7 °C)-98.8 °F (37.1 °C)]   Pulse:  [65-96]   Resp:  [16-48]   BP: ()/(44-82)   SpO2:  [88 %-100 %]   I/O last 3 completed shifts:  In: 4500 [P.O.:300; I.V.:200; Other:1100; NG/GT:2800; IV Piggyback:100]  Out: 4132 [Other:4132]    Wt Readings from Last 5 Encounters:   08/18/20 78.7 kg (173 lb 8 oz)   08/03/20 81.8 kg (180 lb 5.4 oz)   01/27/20 99.3 kg (218 lb 14.7 oz)   10/30/19 99.3 kg (218 lb 14.7 oz)   10/11/19 99.3 kg (219 lb)     Physical Exam:  Constitutional: nad, just back from colonoscopy, sedated, nontoxic, acute/chronically ill  Heart: rrr, no m/r/g, wwp, no edema  Lungs: ctab, no w/r/r/c, no lb  Abdomen: s/nt/nd, +BS    Body mass index is 22.89 kg/m².    Laboratory:  Recent Labs   Lab 08/16/20  1051 08/17/20  0402    137   K 6.1* 5.3*    100   CO2 21* 24   BUN 62* 47*   CREATININE 6.5* 5.7*   ESTGFRAFRICA 9* 10*   EGFRNONAA 7* 9*   GLU 99 97       Recent Labs   Lab 08/16/20  1051 08/17/20  0402 08/18/20  0353   CALCIUM 9.1 9.2  --    ALBUMIN 3.5 3.6  --    PHOS  --   --  3.8       Recent Labs   Lab 07/26/18  0730 08/14/18  1020 11/12/18  0843   PTH, Intact 14.0 19.2 388.8 H       Recent Labs   Lab 08/16/20  1317 08/16/20  1438 08/17/20  0055 08/17/20  0941 08/17/20  1251 08/17/20  1754 08/17/20  2356   POCTGLUCOSE 100 148* 103 117* 91 87 84       Recent Labs   Lab 03/11/19  1109 03/21/19  0456   Hemoglobin A1C 7.5 H 8.3 H       Recent Labs   Lab 08/17/20  0735 08/17/20  1314 08/18/20  0353   WBC 10.19 9.47 8.78   HGB 10.0* 9.9* 9.9*   HCT 30.2* 31.3* 31.4*    165 176   MCV  99* 103* 100*   MCHC 33.1 31.6* 31.5*   MONO 4.8  0.5 7.2  0.7 7.5  0.7       Recent Labs   Lab 08/16/20  1051 08/17/20  0402   BILITOT 0.3 0.4   PROT 7.1 7.2   ALBUMIN 3.5 3.6   ALKPHOS 85 87   ALT 13 12   AST 9* 7*       Recent Labs   Lab 10/31/18  0905  11/05/18  2235 03/11/19  1823 04/02/19  0739   Color, UA yellow  --  Yellow Yellow Yellow   Appearance, UA  --   --  Clear Clear Clear   pH, UA 6  --  6.0 6.0 >8.0 A   Specific Monon, UA  --   --  1.015 1.010 1.010   Spec Grav UA 1.015  --   --   --   --    Protein, UA  --   --  2+ A 2+ A 2+ A   Glucose, UA  --   --  Negative 3+ A Negative   Ketones, UA neg  --  Negative Negative Negative   Urobilinogen, UA neg  --  Negative Negative  --    Bilirubin (UA)  --   --  Negative Negative Negative   Occult Blood UA  --   --  1+ A 1+ A 1+ A   Nitrite, UA neg  --  Negative Negative Negative   RBC, UA  --    < > 1 4 3   WBC, UA  --   --  0 0 2   Bacteria  --   --  None None Occasional   Hyaline Casts, UA  --   --  0 0 0    < > = values in this interval not displayed.       Recent Labs   Lab 03/24/19  1419   POC PH 7.397   POC PCO2 31.7 L   POC HCO3 19.5 L   POC PO2 91   POC SATURATED O2 97   POC BE -5   Sample ARTERIAL       Microbiology Results (last 7 days)     ** No results found for the last 168 hours. **          ASSESSMENT/PLAN:     ESRD on HD MWF via AVF  Continue HD MWF.    Anemia of CKD  GIB  Hb is stable.  EGD report noted.  Will get colonoscopy today.  Continue NIC 10K units with HD MWF.    MBD / Secondary HPT  Phos is at goal.    HTN  Intermittent low BP noted- UF judiciously in setting of GIB.  Keep norvasc, lasix on hold.   Once hemodynamics stabilize, would resume coreg first.    Hyperkalemia  Resolved with dialysis.    Updated family.     Thank you for allowing us to participate in the care of your patient!   We will follow the patient and provide recommendations as needed.    Ángela Arcos MD    [facility]  [address] [facility]  [place]  [id number]    [phone] - tel  [phone] - fax    8/18/2020 12:56 PM

## 2020-08-18 NOTE — PROGRESS NOTES
Colonoscopy done.  No old blood or active bleeding noted.  Likely source of prior bleed is diverticular versus hemorrhoidal.  Resume diet and meds.  If bleeding recurs, then recommend STAT CTA abdomen and pelvis with GI bleeding protocol.  Will follow.

## 2020-08-18 NOTE — RESPIRATORY THERAPY
08/18/20 0845   Patient Assessment/Suction   Level of Consciousness (AVPU) alert   PRE-TX-O2   O2 Device (Oxygen Therapy) room air   SpO2 100 %   Pulse Oximetry Type Continuous   $ Pulse Oximetry - Multiple Charge Pulse Oximetry - Multiple   Pulse 92   Resp (!) 28   Aerosol Therapy   $ Aerosol Therapy Charges PRN treatment not required

## 2020-08-18 NOTE — PROGRESS NOTES
[facility] Hospital Medicine  Progress Note    Patient Name: Micheal Hunt  MRN: [id number]  Patient Class: IP- Inpatient   Admission Date: 8/16/2020  Length of Stay: 2 days  Attending Physician: Linda Baig MD  Primary Care Provider: Pk Lakhani MD        Subjective:     Principal Problem:Gastrointestinal hemorrhage        HPI:  Patient is a 79-year-old male with history of dementia, left-sided stroke with right-sided residual weakness, atrial fibrillation on Eliquis, peg tube, COPD, hypertension, ESRD on Monday Wednesday Friday HD.  Patient is usually cared for by a caregiver during the day 8-8 p.m..  Patient unable to provide much history so history provided in large part by the caregiver.  She states he has been more fatigued over the past 2 days.  He has not complained of abdominal pain.  Has not had any nausea or vomiting.  She left him in bed last night at 8:00 p.m. as usual and when she arrived this morning she found units of dark maroon-colored blood in his bed and diaper.  She reports no further bleeding wants she cleaned month.  He has not received any of his medications today.  Last dose of Eliquis was yesterday.  She reports he has a history of diverticulitis.  He has not missed any dialysis treatments.  She states he eats by mouth during the day and has 12 hr of continuous PEG feeds at night.  Hemoglobin 10.1 in the ED.  Potassium 6.1.  I discussed the case with Nephrology and GI.  Plan to dialyze today and perform EGD in a.m..        Overview/Hospital Course:  No notes on file    Interval History:  Patient seen and examined.  Pleasantly demented.  No complaints.  Colonoscopy with no active bleed, source of bleed likely diverticular or hemorrhoidal per Dr. Harris.  I called and updated family.  Okay to restart medications including aspirin Eliquis per Dr. Harris.    Review of Systems   Unable to perform ROS: Dementia     Objective:     Vital Signs (Most  Recent):  Temp: 97.7 °F (36.5 °C) (08/18/20 1229)  Pulse: 71 (08/18/20 1245)  Resp: 18 (08/18/20 1245)  BP: 136/60 (08/18/20 1245)  SpO2: 100 % (08/18/20 1245) Vital Signs (24h Range):  Temp:  [97.7 °F (36.5 °C)-98.8 °F (37.1 °C)] 97.7 °F (36.5 °C)  Pulse:  [69-96] 71  Resp:  [16-78] 18  SpO2:  [88 %-100 %] 100 %  BP: ()/(44-81) 136/60     Weight: 78.5 kg (173 lb)  Body mass index is 22.82 kg/m².    Intake/Output Summary (Last 24 hours) at 8/18/2020 1338  Last data filed at 8/18/2020 1100  Gross per 24 hour   Intake 5140 ml   Output 1500 ml   Net 3640 ml      Physical Exam  Constitutional:       General: He is not in acute distress.     Appearance: He is well-developed.   HENT:      Head: Normocephalic and atraumatic.   Eyes:      Pupils: Pupils are equal, round, and reactive to light.   Cardiovascular:      Rate and Rhythm: Normal rate and regular rhythm.      Heart sounds: No murmur.   Pulmonary:      Effort: Pulmonary effort is normal. No respiratory distress.      Breath sounds: Normal breath sounds. No wheezing or rales.   Abdominal:      General: Bowel sounds are normal. There is no distension.      Palpations: Abdomen is soft.      Tenderness: There is no abdominal tenderness.   Musculoskeletal:      Comments: Right-sided weakness from prior stroke   Skin:     General: Skin is warm and dry.      Findings: No rash.   Neurological:      Mental Status: He is alert and oriented to person, place, and time.      Cranial Nerves: No cranial nerve deficit.   Psychiatric:         Behavior: Behavior normal.      Comments: Pleasantly demented         Significant Labs: All pertinent labs within the past 24 hours have been reviewed.    Significant Imaging: I have reviewed and interpreted all pertinent imaging results/findings within the past 24 hours.      Assessment/Plan:      * Gastrointestinal hemorrhage  Placed on GI bleed pathway.  EGD and colonoscopy without evidence.  Discussed with Dr. Harris.  Jono to  restart medications.  Okay to transfer out of ICU.If bleeding recurs, then recommend STAT CTA abdomen and pelvis with GI bleeding protocol.  Family updated.    PEG (percutaneous endoscopic gastrostomy) status  Patient with PEG tube.  Per caregiver he eats soft food by mouth during the day and has 12 hr of PEG feeding at night.      Consult with speech therapy to evaluate swallow  Restart nightly PEG tube feedings    Daughter requesting placement of Sunil-KEY peg tube.  Discussed with Dr. Harris who said he can do this in clinic but it needs to be ordered 1st and intake a few weeks to come in.  This was explained to daughter.    Hyperkalemia  Dialysis per Nephrology    Cognitive communication disorder  Patient with dementia.  Dementia is controlled currently. Continue home dementia meds and non-pharmacologic interventions to prevent delirium (No VS between 11PM-5AM, activity during day, opening blinds, providing glasses/hearing aids, and up in chair during daytime). Use PRN anti-psychotics to prevent behavior of self harm during sundowning, and avoid narcotics and benzos unless absolutely necessary. PRN anti-psychotics prescribed to avoid self harm behaviors.        Cerebrovascular accident (CVA) due to occlusion of left posterior cerebral artery  Patient with stroke in 2018 with residual right-sided weakness.  Continue aspirin and statin.  Fall precautions.      ESRD (end stage renal disease)  Receives Monday Wednesday Friday HD.  Has not missed treatment.  Nephrology consulted and will performed HD 8/16 for hyperkalemia.  Continue HD per nephrologist    CAD (coronary artery disease), 2V CABG 2007  Patient with known CAD s/p CABG, which is controlled Will continue aspirin and statin and monitor for S/Sx of angina/ACS. Continue to monitor on telemetry.         Hyperlipidemia  Continue statin      Essential hypertension  Hold home antihypertensives now given some intermittent hypotension.  Per Nephrology no will  restart Coreg 1st once blood pressure stabilizes        VTE Risk Mitigation (From admission, onward)         Ordered     apixaban tablet 2.5 mg  2 times daily      08/18/20 1338     IP VTE HIGH RISK PATIENT  Once      08/16/20 1606     Place sequential compression device  Until discontinued      08/16/20 1606                Discharge Planning   MARKELL:      Code Status: Full Code   Is the patient medically ready for discharge?:     Reason for patient still in hospital (select all that apply): Patient trending condition  Discharge Plan A: Home Health            Critical care time spent on the evaluation and treatment of severe organ dysfunction, review of pertinent labs and imaging studies, discussions with consulting providers and discussions with patient/family: 35 minutes.  Plan of care discussed with GI Dr. Harris.  Family updated.    Linda Baig MD  Department of Hospital Medicine   [facility]

## 2020-08-18 NOTE — PLAN OF CARE
VSS, pt awakens when spoken to. Denies pain. Report given to ANTONIO Borges. Pt remains resting in ICU

## 2020-08-18 NOTE — ANESTHESIA POSTPROCEDURE EVALUATION
Anesthesia Post Evaluation    Patient: Micheal Hunt    Procedure(s) Performed: Procedure(s) (LRB):  COLONOSCOPY (N/A)    Final Anesthesia Type: general    Patient location during evaluation: PACU  Patient participation: Yes- Able to Participate  Level of consciousness: awake and alert  Post-procedure vital signs: reviewed and stable  Pain management: adequate  Airway patency: patent    PONV status at discharge: No PONV  Anesthetic complications: no      Cardiovascular status: blood pressure returned to baseline  Respiratory status: unassisted  Hydration status: euvolemic  Follow-up not needed.          Vitals Value Taken Time   /75 08/18/20 1431   Temp 36.5 °C (97.7 °F) 08/18/20 1229   Pulse 73 08/18/20 1447   Resp 22 08/18/20 1447   SpO2 99 % 08/18/20 1441   Vitals shown include unvalidated device data.      Event Time   Out of Recovery 08/18/2020 12:48:21         Pain/Sonny Score: Sonny Score: 10 (8/18/2020 12:45 PM)

## 2020-08-18 NOTE — PLAN OF CARE
Plan of care reviewed with pt.  Pt on ra, afebrile, afib with controlled rate in 70-80s, NPO on night shift, go lytely prep adm to pt via peg, pt started c/o after 300cc, reminded him of the procedure that would be done in am, and just a little more.  Was able to adm over most of prep, pt had in excess of 5 moderate bms, dark red.  Pt had dialysis on day shift, removing 1l.   Safety maintained with frequent checks, frequent linen and diaper chgs, pt with no complaints, very pleasant.  VSS, NADN.

## 2020-08-18 NOTE — PROGRESS NOTES
This note also relates to the following rows which could not be included:  Pulse - Cannot attach notes to unvalidated device data  Resp - Cannot attach notes to unvalidated device data  SpO2 - Cannot attach notes to unvalidated device data  BP - Cannot attach notes to unvalidated device data  MAP (mmHg) - Cannot attach notes to unvalidated device data       08/17/20 1915   Vital Signs   Temp 98.2 °F (36.8 °C)   Temp src Axillary   Assessments (Pre/Post)   Consent Obtained yes   Safety vein preservation armband present   Date Hepatitis Profile Obtained 08/16/20   Blood Liters Processed (BLP) 51   Transport Modality not applicable   Level of Consciousness (AVPU) alert   Dialyzer Clearance mildly streaked   Pre-Hemodialysis Assessment   Patient Status Departed   Post-Hemodialysis Assessment   Rinseback Volume (mL) 250 mL   Blood Volume Processed (Liters) 51 L   Dialyzer Clearance Lightly streaked   Duration of Treatment (minutes) 180 minutes   Hemodialysis Intake (mL) 500 mL   Total UF (mL) 1500 mL   Net Fluid Removal 1000   Patient Response to Treatment tolerated fair   Post-Treatment Weight 81 kg (178 lb 9.2 oz)   Treatment Weight Change -1.6   Arterial bleeding stop time (min) 10 min   Venous bleeding stop time (min) 10 min   Post-Hemodialysis Comments pt reinfused per policy and procedure.   Verified signed consent prior to start of hd treatment.  Net UF 1 liter.  Report provided to Ada post hd treatment.

## 2020-08-18 NOTE — ASSESSMENT & PLAN NOTE
Hold home antihypertensives now given some intermittent hypotension.  Per Nephrology no will restart Coreg 1st once blood pressure stabilizes

## 2020-08-18 NOTE — PT/OT/SLP PROGRESS
Physical Therapy      Patient Name:  Micheal Hunt   MRN:  [id number]    PT attempted- colonoscopy- will re attempt.    Malaika Cerrato, PT

## 2020-08-18 NOTE — PT/OT/SLP PROGRESS
Speech Language Pathology      Micheal Hunt  MRN: [id number]    Orders received for clinical swallow evaluation. Patient not seen today secondary to Nursing hold (Comment)(Pt about to transfer out of ICU). Chart reviewed. Please note, most recent MBSS 2/6/2020 revealed severe pharyngeal dysphagia with recommendation of regular textures(no dry/crumbly foods) with HONEY thick liquids and meds crushed. Will follow up tomorrow.     Khushi Ulloa, RMIA-SLP

## 2020-08-18 NOTE — [employer] PATIENT INSTRUCTIONS
Discharge Summary/Instructions after an Endoscopic Procedure  Patient Name: Micheal Hunt  Patient MRN: [id number]  Patient YOB: 1940 Tuesday, August 18, 2020  Messi Madrid MD  RESTRICTIONS:  During your procedure today, you received medications for sedation.  These   medications may affect your judgment, balance and coordination.  Therefore,   for 24 hours, you have the following restrictions:   - DO NOT drive a car, operate machinery, make legal/financial decisions,   sign important papers or drink alcohol.    ACTIVITY:  Today: no heavy lifting, straining or running due to procedural   sedation/anesthesia.  The following day: return to full activity including work.  DIET:  Eat and drink normally unless instructed otherwise.     TREATMENT FOR COMMON SIDE EFFECTS:  - Mild abdominal pain, nausea, belching, bloating or excessive gas:  rest,   eat lightly and use a heating pad.  - Sore Throat: treat with throat lozenges and/or gargle with warm salt   water.  - Because air was used during the procedure, expelling large amounts of air   from your rectum or belching is normal.  - If a bowel prep was taken, you may not have a bowel movement for 1-3 days.    This is normal.  SYMPTOMS TO WATCH FOR AND REPORT TO YOUR PHYSICIAN:  1. Abdominal pain or bloating, other than gas cramps.  2. Chest pain.  3. Back pain.  4. Signs of infection such as: chills or fever occurring within 24 hours   after the procedure.  5. Rectal bleeding, which would show as bright red, maroon, or black stools.   (A tablespoon of blood from the rectum is not serious, especially if   hemorrhoids are present.)  6. Vomiting.  7. Weakness or dizziness.  GO DIRECTLY TO THE NEAREST EMERGENCY ROOM IF YOU HAVE ANY OF THE FOLLOWING:      Difficulty breathing              Chills and/or fever over 101 F   Persistent vomiting and/or vomiting blood   Severe abdominal pain   Severe chest pain   Black, tarry stools   Bleeding- more than one  tablespoon   Any other symptom or condition that you feel may need urgent attention  Your doctor recommends these additional instructions:  If any biopsies were taken, your doctors clinic will contact you in 1 to 2   weeks with any results.  - Return patient to ICU for ongoing care.   - Resume previous diet.   - Continue present medications.   - Await pathology results.   - No repeat colonoscopy due to age.   - Do a GI bleeding (tagged RBC) scan if symptoms persist.  For questions, problems or results please call your physician - Messi Madrid MD at Work:  [phone].  [facility], EMERGENCY ROOM PHONE NUMBER: [phone]  IF A COMPLICATION OR EMERGENCY SITUATION ARISES AND YOU ARE UNABLE TO REACH   YOUR PHYSICIAN - GO DIRECTLY TO THE EMERGENCY ROOM.  Messi Madrid MD  8/18/2020 12:34:12 PM  This report has been verified and signed electronically.  [employer]

## 2020-08-18 NOTE — ANESTHESIA PREPROCEDURE EVALUATION
08/18/2020  Micheal Hunt is a 79 y.o., male.    Pre-op Assessment    I have reviewed the Patient Summary Reports.     I have reviewed the Nursing Notes. I have reviewed the NPO Status.   I have reviewed the Medications.     Review of Systems  Anesthesia Hx:  No problems with previous Anesthesia  Denies Family Hx of Anesthesia complications.  Personal Hx of Anesthesia complications   Social:  Non-Smoker    Hematology/Oncology:         -- Anemia:   Cardiovascular:   Hypertension, well controlled Past MI CAD asymptomatic CABG/stent  CHF CORONARY ARTERY BYPASS GRAFT Carotoid Artery Disease    Pulmonary:   Pneumonia COPD, mild Asthma Sleep Apnea    Renal/:   Chronic Renal Disease, CRI    Hepatic/GI:   Bowel Prep. GERD    Musculoskeletal:   Arthritis     Neurological:   CVA    Endocrine:   Diabetes, poorly controlled, type 2        Physical Exam  General:  Well nourished    Airway/Jaw/Neck:  Airway Findings: Mouth Opening: Normal Tongue: Normal  General Airway Assessment: Adult, Good  Mallampati: II  Improves to II with phonation.  TM Distance: 4-6 cm      Dental:  Dental Findings: In tact   Chest/Lungs:  Chest/Lungs Findings: Normal Respiratory Rate     Heart/Vascular:  Heart Findings: Rate: Normal  Rhythm: Regular Rhythm  Heart murmur: negative       Mental Status:  Mental Status Findings:  Cooperative, Alert and Oriented         Anesthesia Plan  Type of Anesthesia, risks & benefits discussed:  Anesthesia Type:  general  Patient's Preference:   Intra-op Monitoring Plan:   Intra-op Monitoring Plan Comments:   Post Op Pain Control Plan:   Post Op Pain Control Plan Comments:   Induction:   IV  Beta Blocker:  Patient is on a Beta-Blocker and has received one dose within the past 24 hours (No further documentation required).       Informed Consent: Patient understands risks and agrees with Anesthesia plan.   Questions answered. Anesthesia consent signed with patient.  ASA Score: 4     Day of Surgery Review of History & Physical:    H&P update referred to the provider.         Ready For Surgery From Anesthesia Perspective.

## 2020-08-18 NOTE — ASSESSMENT & PLAN NOTE
Placed on GI bleed pathway.  EGD and colonoscopy without evidence.  Discussed with Dr. Harris.  Okay to restart medications.  Okay to transfer out of ICU.If bleeding recurs, then recommend STAT CTA abdomen and pelvis with GI bleeding protocol.  Family updated.

## 2020-08-18 NOTE — SUBJECTIVE & OBJECTIVE
Interval History:  Patient seen and examined.  Pleasantly demented.  No complaints.  Colonoscopy with no active bleed, source of bleed likely diverticular or hemorrhoidal per Dr. Harris.  I called and updated family.  Okay to restart medications including aspirin Eliquis per Dr. Harris.    Review of Systems   Unable to perform ROS: Dementia     Objective:     Vital Signs (Most Recent):  Temp: 97.7 °F (36.5 °C) (08/18/20 1229)  Pulse: 71 (08/18/20 1245)  Resp: 18 (08/18/20 1245)  BP: 136/60 (08/18/20 1245)  SpO2: 100 % (08/18/20 1245) Vital Signs (24h Range):  Temp:  [97.7 °F (36.5 °C)-98.8 °F (37.1 °C)] 97.7 °F (36.5 °C)  Pulse:  [69-96] 71  Resp:  [16-78] 18  SpO2:  [88 %-100 %] 100 %  BP: ()/(44-81) 136/60     Weight: 78.5 kg (173 lb)  Body mass index is 22.82 kg/m².    Intake/Output Summary (Last 24 hours) at 8/18/2020 1338  Last data filed at 8/18/2020 1100  Gross per 24 hour   Intake 5140 ml   Output 1500 ml   Net 3640 ml      Physical Exam  Constitutional:       General: He is not in acute distress.     Appearance: He is well-developed.   HENT:      Head: Normocephalic and atraumatic.   Eyes:      Pupils: Pupils are equal, round, and reactive to light.   Cardiovascular:      Rate and Rhythm: Normal rate and regular rhythm.      Heart sounds: No murmur.   Pulmonary:      Effort: Pulmonary effort is normal. No respiratory distress.      Breath sounds: Normal breath sounds. No wheezing or rales.   Abdominal:      General: Bowel sounds are normal. There is no distension.      Palpations: Abdomen is soft.      Tenderness: There is no abdominal tenderness.   Musculoskeletal:      Comments: Right-sided weakness from prior stroke   Skin:     General: Skin is warm and dry.      Findings: No rash.   Neurological:      Mental Status: He is alert and oriented to person, place, and time.      Cranial Nerves: No cranial nerve deficit.   Psychiatric:         Behavior: Behavior normal.      Comments: Pleasantly  demented         Significant Labs: All pertinent labs within the past 24 hours have been reviewed.    Significant Imaging: I have reviewed and interpreted all pertinent imaging results/findings within the past 24 hours.

## 2020-08-18 NOTE — ASSESSMENT & PLAN NOTE
Patient with known CAD s/p CABG, which is controlled Will continue aspirin and statin and monitor for S/Sx of angina/ACS. Continue to monitor on telemetry.

## 2020-08-18 NOTE — PT/OT/SLP EVAL
"Occupational Therapy   Evaluation and Discharge Note    Name: Micheal Hunt  MRN: [id number]  Admitting Diagnosis:  Gastrointestinal hemorrhage Day of Surgery    Recommendations:     Discharge Recommendations: home, home with home health  Discharge Equipment Recommendations:  none  Barriers to discharge:  None    Assessment:     Micheal Hunt is a 79 y.o. male with a medical diagnosis of Gastrointestinal hemorrhage. Patient is pleasantly confused and unable to provide history at this time. Patient's nurse present throughout. At this time, patient is functioning at their prior level of function and does not require further acute OT services. Per patient chart and per patient's nurseSean, patient is at baseline of dependent for ADL. Patient has sitter 12 hours/day who assists with all self care. Patient's daughter lives in house behind patient's and provides pt's transportation.     Plan:     During this hospitalization, patient does not require further acute OT services.  Please re-consult if situation changes.    · Plan of Care Reviewed with: patient    Subjective     Chief Complaint: No complaints  Patient/Family Comments/goals: Patient pleasantly reporting "No, if I ask her to" in response to all questions asked by OTR regarding PLOF    Occupational Profile:  Living Environment: Lives alone in [facility] - daughter lives in house behind patient's; patient has caregiver 12 hrs/day  Previous level of function: Dependent for all ADL and associated mobility - transfer bed<>chair   Equipment Used at home:  bedside commode, wheelchair, lift device, hospital bed, raised toilet  Assistance upon Discharge: Caregiver 12 hrs/day and daughter    Pain/Comfort:  · Pain Rating 1: other (see comments)(no pain reported or indicated)    Patients cultural, spiritual, Buddhist conflicts given the current situation: no    Objective:     Communicated with: Nurse prior to session.  Patient found HOB elevated with PEG Tube, " telemetry, blood pressure cuff, pulse ox (continuous), bed alarm upon OT entry to room.    General Precautions: Standard, fall, seizure   Orthopedic Precautions:N/A   Braces: N/A     Occupational Performance:    Dependent for toileting, bathing, dressing     Cognitive/Visual Perceptual:  Cognitive/Psychosocial Skills:     -       Oriented to: Person   -       Follows Commands/attention:Follows one-step commands  -       Communication: aphasia  -       Memory: Impaired  -       Safety awareness/insight to disability: impaired     Physical Exam:  Upper Extremity Range of Motion: (R) PROM WFL; AROM absent (L) AROM WFL  Upper Extremity Strength: (R) 0/5; (L) WFL    AMPAC 6 Click ADL:  AMPAC Total Score: 13    Treatment & Education:  - OTR providing education/instruction regarding OT role/purpose, safety awareness - pt verbalizes understanding but no evidence of learning  Education:    Patient left HOB elevated with all lines intact, call button in reach, bed alarm on and RNSean present    GOALS:   Multidisciplinary Problems     Occupational Therapy Goals     Not on file                History:     Past Medical History:   Diagnosis Date    NICK (acute kidney injury) 7/29/2016    Allergy     Anemia, mild 12/15/2014    Arthritis     Gout    Atrial fibrillation 03/2019    Benign essential HTN 3/27/2012    BMI 29.0-29.9,adult 5/10/2018    BPH (benign prostatic hyperplasia)     BPH (benign prostatic hyperplasia)     CAD (coronary artery disease) 2006    Carotid artery occlusion     60-70% FROYLAN, LICA < 50%    Chronic kidney disease     due to ibuprofen    Colon polyp     CRF (chronic renal failure), stage 5     Diabetes mellitus     Diverticulosis     ESRD (end stage renal disease) on dialysis     Gastritis     GERD (gastroesophageal reflux disease)     Gout     History of colon polyps 5/3/2018    HTN (hypertension) 3/27/2012    Hyperlipidemia     Hyperlipidemia     LLL pneumonia 6/14/2018    LVH  (left ventricular hypertrophy)     Mesenteric ischemia     Murmur, cardiac 3/27/2012    GRICELDA (obstructive sleep apnea)     DOES NOT USE A MACHINE    Sinus problem     Stroke 03/2019    acute left PCA    Syncope and collapse        Past Surgical History:   Procedure Laterality Date    APPENDECTOMY      CARDIAC CATHETERIZATION  2012    LIMA to LAD patent, SVG to RCA    COLONOSCOPY  2011    COLONOSCOPY N/A 5/3/2018    Procedure: COLONOSCOPY;  Surgeon: Messi Harris MD;  Location: [facility] ENDO;  Service: Endoscopy;  Laterality: N/A;    CORONARY ARTERY BYPASS GRAFT  4/2007    x 2 [place]    ESOPHAGOGASTRODUODENOSCOPY N/A 8/17/2020    Procedure: EGD (ESOPHAGOGASTRODUODENOSCOPY);  Surgeon: Eben Soto MD;  Location: [facility] ENDO;  Service: Endoscopy;  Laterality: N/A;    JOINT REPLACEMENT      left knee total replacement  X 3    mid leftt finger      from a cactuss    MOLE REMOVAL  2016    rotative cuff      no rotative cuffs on bilat shoulders has pins     SHOULDER SURGERY      shoulder surgery bilat  RIGHT X 4; LEFT X 3       Time Tracking:     OT Date of Treatment: 08/18/20  OT Start Time: 1431  OT Stop Time: 1439  OT Total Time (min): 8 min    Billable Minutes:Evaluation 8    LUZ Sykes, LOTR  8/18/2020

## 2020-08-18 NOTE — TELEPHONE ENCOUNTER
----- Message from Eben Soto MD sent at 8/17/2020  6:10 PM CDT -----  Needs referral for EUS for gastric nodule, but he is inpatient right now.

## 2020-08-18 NOTE — ASSESSMENT & PLAN NOTE
Patient with stroke in 2018 with residual right-sided weakness.  Continue aspirin and statin.  Fall precautions.

## 2020-08-18 NOTE — TRANSFER OF CARE
"Anesthesia Transfer of Care Note    Patient: Micheal Hunt    Procedure(s) Performed: Procedure(s) (LRB):  COLONOSCOPY (N/A)    Patient location: ICU    Anesthesia Type: general    Post pain: adequate analgesia    Post assessment: no apparent anesthetic complications    Post vital signs: stable    Level of consciousness: awake and alert    Nausea/Vomiting: no nausea/vomiting    Complications: none    Transfer of care protocol was followed      Last vitals:   Visit Vitals  /67   Pulse 96   Temp 36.5 °C (97.7 °F)   Resp 16   Ht 6' 1" (1.854 m)   Wt 78.5 kg (173 lb)   SpO2 100%   BMI 22.82 kg/m²     "

## 2020-08-18 NOTE — ASSESSMENT & PLAN NOTE
Patient with PEG tube.  Per caregiver he eats soft food by mouth during the day and has 12 hr of PEG feeding at night.      Consult with speech therapy to evaluate swallow  Restart nightly PEG tube feedings    Daughter requesting placement of Sunil-KEY peg tube.  Discussed with Dr. Harris who said he can do this in clinic but it needs to be ordered 1st and intake a few weeks to come in.  This was explained to daughter.

## 2020-08-19 NOTE — ANESTHESIA POSTPROCEDURE EVALUATION
Anesthesia Post Evaluation    Patient: Micheal Hunt    Procedure(s) Performed: Procedure(s) (LRB):  EGD (ESOPHAGOGASTRODUODENOSCOPY) (N/A)    Final Anesthesia Type: general    Patient location during evaluation: PACU  Patient participation: Yes- Able to Participate  Level of consciousness: awake and alert, oriented and awake  Post-procedure vital signs: reviewed and stable  Pain management: adequate  Airway patency: patent    PONV status at discharge: No PONV  Anesthetic complications: no      Cardiovascular status: blood pressure returned to baseline and hemodynamically stable  Respiratory status: unassisted, spontaneous ventilation and room air  Hydration status: euvolemic  Follow-up not needed.          Vitals Value Taken Time   /70 08/19/20 0730   Temp 35.8 °C (96.5 °F) 08/19/20 0730   Pulse 79 08/19/20 0730   Resp 18 08/19/20 0730   SpO2 98 % 08/19/20 0730         Event Time   Out of Recovery 08/17/2020 12:20:12         Pain/Sonny Score: Sonny Score: 10 (8/18/2020 12:45 PM)

## 2020-08-19 NOTE — PLAN OF CARE
Problem: SLP Goal  Goal: SLP Goal  Outcome: Met    Clinical swallow evaluation completed. Pt with chronic dysphagia and aspiration. Spoke with daughter, Tonya, via phone. REC he resume PLEASURE FEEDS of pureed textures and thin liquids, no straws. PEG for primary means of nutrition. Medications via PEG. Please do not send meal trays to room; pleasure feeds only.

## 2020-08-19 NOTE — PROGRESS NOTES
"Nephrology Progress Note    Patient Name: Micheal Hunt  MRN: [id number]    Patient Class: IP- Inpatient   Admission Date: 8/16/2020  Length of Stay: 3 days  Date of Service: 8/19/2020    Attending Physician: Linda Baig MD  Primary Care Provider: Pk Lakhani MD    Reason for Consult: esrd/anemia/htn/shpt/gib/hyperkalemia    Chief Complaint   Patient presents with    Rectal Bleeding     " bright red blood " this am        SUBJECTIVE:     HPI: 79M with dementia, stroke with residual right-sided hemiparesis, seizures, dysphagia with PEG tube, ESRD on HD MWF, and AF on Eliquis presents with rectal bleeding since last night. Last dialyzed on Friday. Patient still makes urine. He denies vomiting blood. Caregiver provides that patient was found with a significant amount of rectal blood with some diarrhea which occurred some time in the middle of the night. K is 6.1 in ER, cant give binder, will need urgent HD and then maybe endoscopy.    Interval History:  8/17- had EGD today- reviewed; colonoscopy tomorrow; intermittent hypotension, on RA; no complaints  8/18- colonoscopy today, intermittent hypotension, on RA, did 1L UF yest   8/19- colonoscopy neg for active bleed, VSS, on RA; no complaints    Outpatient meds:  No current facility-administered medications on file prior to encounter.      Current Outpatient Medications on File Prior to Encounter   Medication Sig Dispense Refill    albuterol (PROVENTIL/VENTOLIN HFA) 90 mcg/actuation inhaler 2 puffs every 4 hours as needed for cough, wheeze, or shortness of breath      allopurinoL (ZYLOPRIM) 100 MG tablet 100 mg by FEEDING TUBE route once daily.      amLODIPine (NORVASC) 10 MG tablet 10 mg by Per G Tube route once daily.       apixaban (ELIQUIS) 2.5 mg Tab 2.5 mg by FEEDING TUBE route 2 (two) times daily.      aspirin (ECOTRIN) 81 MG EC tablet 81 mg by FEEDING TUBE route once daily.      atorvastatin (LIPITOR) 40 MG tablet 1 tablet (40 mg total) by Per " G Tube route once daily. 90 tablet 4    B complex-vitamin C-folic acid 800 mcg Chew 0.8 mg by Per G Tube route once daily.      carvediloL (COREG) 12.5 MG tablet Take 6.5 tablets (81.25 mg total) by mouth 2 (two) times daily. (Patient taking differently: 78.125 mg by Per G Tube route 2 (two) times daily. ) 90 tablet 4    finasteride (PROSCAR) 5 mg tablet Take 1 tablet (5 mg total) by mouth once daily. (Patient taking differently: 5 mg by Per G Tube route once daily. ) 90 tablet 3    fluticasone (FLONASE) 50 mcg/actuation nasal spray 2 sprays (100 mcg total) by Each Nare route once daily. 3 Bottle 3    furosemide (LASIX) 40 MG tablet 1 tablet by FEEDING TUBE route every Tuesday, Thursday, Saturday, Sunday.      levETIRAcetam (KEPPRA) 100 mg/mL Soln 7.5 ml by PEG daily 120 mL 5    bacitracin 500 unit/gram ointment Apply topically as needed.      melatonin 10 mg Tab Take by mouth nightly.      mupirocin (BACTROBAN) 2 % ointment Apply topically 3 (three) times daily. (Patient not taking: Reported on 6/30/2020) 1 Tube 2    nut.tx.imp.renal fxn,lac-reduc (NEPRO CARB STEADY ORAL) 240 mLs by FEEDING TUBE route 2 (two) times daily.      nut.tx.imp.renal fxn,lac-reduc (NEPRO CARB STEADY ORAL) 1,000 mLs by FEEDING TUBE route 2 (two) times daily.      pantoprazole (PROTONIX) 40 MG tablet Take 1 tablet (40 mg total) by mouth once daily. (Patient not taking: Reported on 6/30/2020) 30 tablet 11    polyethylene glycol (GLYCOLAX) 17 gram PwPk Take 17 g by mouth.      pramoxine-hydrocortisone cream Apply topically 3 (three) times daily. (Patient not taking: Reported on 6/30/2020) 57 g 3    senna-docusate 8.6-50 mg (PERICOLACE) 8.6-50 mg per tablet Take 1 tablet by mouth.      tamsulosin (FLOMAX) 0.4 mg Cap Take 1 capsule (0.4 mg total) by mouth once daily. (Patient not taking: Reported on 6/30/2020) 90 capsule 2    tiotropium bromide (SPIRIVA RESPIMAT) 1.25 mcg/actuation Mist Inhale 2.5 mcg into the lungs once daily.  Controller (Patient not taking: Reported on 6/30/2020) 4 g 5    vit b cmplx 3-fa-vit c-biotin 1- mg-mg-mcg (NEPHRO-EMMETT RX) 1- mg-mg-mcg Tab Take 1 tablet by mouth once daily. 90 tablet 3       Scheduled meds:   apixaban  2.5 mg Per G Tube BID    aspirin  81 mg Oral Daily    atorvastatin  40 mg Per G Tube Daily    epoetin kitty-epbx  10,000 Units Subcutaneous Every Mon, Wed, Fri    levetiracetam IVPB  750 mg Intravenous Q24H    pantoprazole  40 mg Intravenous BID     Review of Systems:  negative    OBJECTIVE:     Vital Signs and IO (Last 24H):  Temp:  [96.5 °F (35.8 °C)-99.1 °F (37.3 °C)]   Pulse:  [66-96]   Resp:  [16-25]   BP: ()/(58-71)   SpO2:  [95 %-100 %]   I/O last 3 completed shifts:  In: 4300 [Other:500; NG/GT:3700; IV Piggyback:100]  Out: 1500 [Other:1500]    Wt Readings from Last 5 Encounters:   08/18/20 78.5 kg (173 lb)   08/03/20 81.8 kg (180 lb 5.4 oz)   01/27/20 99.3 kg (218 lb 14.7 oz)   10/30/19 99.3 kg (218 lb 14.7 oz)   10/11/19 99.3 kg (219 lb)     Physical Exam:  Constitutional: nad, aao x 3  Heart: rrr, no m/r/g, wwp, no edema  Lungs: ctab, no w/r/r/c, no lb  Abdomen: s/nt/nd, +BS    Body mass index is 22.82 kg/m².    Laboratory:  Recent Labs   Lab 08/17/20  0402 08/18/20  0353 08/19/20  0433    137 136   K 5.3* 4.9 4.1    97 97   CO2 24 24 26   BUN 47* 34* 44*   CREATININE 5.7* 4.8* 6.6*   ESTGFRAFRICA 10* 12* 8*   EGFRNONAA 9* 11* 7*   GLU 97 79 137*       Recent Labs   Lab 08/17/20  0402 08/18/20  0353 08/19/20  0433   CALCIUM 9.2 9.1 8.8   ALBUMIN 3.6 3.5 3.3*   PHOS  --  3.8  3.8 4.0       Recent Labs   Lab 07/26/18  0730 08/14/18  1020 11/12/18  0843   PTH, Intact 14.0 19.2 388.8 H       Recent Labs   Lab 08/16/20  1317 08/16/20  1438 08/17/20  0055 08/17/20  0941 08/17/20  1251 08/17/20  1754 08/17/20  2356 08/18/20  1136 08/18/20  1618 08/19/20  0023   POCTGLUCOSE 100 148* 103 117* 91 87 84 76 72 111*       Recent Labs   Lab 03/11/19  1109  03/21/19  0456   Hemoglobin A1C 7.5 H 8.3 H       Recent Labs   Lab 08/17/20  1314 08/18/20  0353 08/19/20  0433   WBC 9.47 8.78 7.62   HGB 9.9* 9.9* 9.8*   HCT 31.3* 31.4* 30.5*    176 169   * 100* 101*   MCHC 31.6* 31.5* 32.1   MONO 7.2  0.7 7.5  0.7 7.5  0.6       Recent Labs   Lab 08/16/20  1051 08/17/20  0402 08/18/20  0353 08/19/20  0433   BILITOT 0.3 0.4  --   --    PROT 7.1 7.2  --   --    ALBUMIN 3.5 3.6 3.5 3.3*   ALKPHOS 85 87  --   --    ALT 13 12  --   --    AST 9* 7*  --   --        Recent Labs   Lab 10/31/18  0905  11/05/18  2235 03/11/19  1823 04/02/19  0739   Color, UA yellow  --  Yellow Yellow Yellow   Appearance, UA  --   --  Clear Clear Clear   pH, UA 6  --  6.0 6.0 >8.0 A   Specific Fries, UA  --   --  1.015 1.010 1.010   Spec Grav UA 1.015  --   --   --   --    Protein, UA  --   --  2+ A 2+ A 2+ A   Glucose, UA  --   --  Negative 3+ A Negative   Ketones, UA neg  --  Negative Negative Negative   Urobilinogen, UA neg  --  Negative Negative  --    Bilirubin (UA)  --   --  Negative Negative Negative   Occult Blood UA  --   --  1+ A 1+ A 1+ A   Nitrite, UA neg  --  Negative Negative Negative   RBC, UA  --    < > 1 4 3   WBC, UA  --   --  0 0 2   Bacteria  --   --  None None Occasional   Hyaline Casts, UA  --   --  0 0 0    < > = values in this interval not displayed.       Recent Labs   Lab 03/24/19  1419   POC PH 7.397   POC PCO2 31.7 L   POC HCO3 19.5 L   POC PO2 91   POC SATURATED O2 97   POC BE -5   Sample ARTERIAL       Microbiology Results (last 7 days)     ** No results found for the last 168 hours. **          ASSESSMENT/PLAN:     ESRD on HD MWF via AVF  Continue HD MWF.    Anemia of CKD  GIB  Hb is stable.  EGD/[place] completed.  Continue NIC 10K units with HD MWF.    MBD / Secondary HPT  Phos is at goal.    HTN  Intermittent low BP noted- UF judiciously in setting of GIB.  Keep norvasc, lasix on hold.   Can resume coreg at discahrge.    Hyperkalemia  Resolved with  dialysis.    Updated family.   Ok with d/c after dialysis from renal standpoint.    Thank you for allowing us to participate in the care of your patient!   We will follow the patient and provide recommendations as needed.    Ángela Arcos MD    [facility]  [address] [place] [address]  [place] [address]    [phone] - tel  [phone] - fax    8/19/2020 12:56 PM

## 2020-08-19 NOTE — PLAN OF CARE
Problem: Physical Therapy Goal  Goal: Physical Therapy Goal  Description: Goals to be met by:      Patient will increase functional independence with mobility by performin. Supine to sit with MInimal Assistance  2. Sit to stand transfer with Minimal Assistance  3. Bed to chair transfer with Minimal Assistance  4. Gait  x 50 feet with Minimal Assistance using Rolling Walker.   5. Lower extremity exercise program x20 reps    Outcome: Ongoing, Progressing  Pt performed thera ex x20 AP, QS, SLR. Pt able to sit EOB and stand - took few steps to transfer to chair mod A with hand-held assist. OOB to chair. Continue HHPT recommended.

## 2020-08-19 NOTE — CARE UPDATE
08/19/20 0836   Patient Assessment/Suction   Level of Consciousness (AVPU) alert   PRE-TX-O2   O2 Device (Oxygen Therapy) room air   SpO2 97 %   Pulse Oximetry Type Intermittent   $ Pulse Oximetry - Multiple Charge Pulse Oximetry - Multiple   Pulse 80   Resp 18

## 2020-08-19 NOTE — PROGRESS NOTES
Patient signed consent form for procedure, but given question of dementia, form was reviewed by phone with his daughter who consented.  Consent was witnessed by Veronica Pulido RN.

## 2020-08-19 NOTE — PLAN OF CARE
The pt is discharging home with resumption of [facility]  services. The pt has a follow up appt with [facility] NP at home and is clear for discharge from case management. Viktoria Cm LCSW   08/19/20 1217   Final Note   Assessment Type Final Discharge Note   [facility]position [facility]  Appt(s) scheduled? Yes

## 2020-08-19 NOTE — PLAN OF CARE
I called [facility] at [phone] and updated them that the pt is discharging home today and needs services resumed. I booked the pts discharge destination . Viktoria Cm LCSW     08/19/20 1216   Post-Acute Status   Post-Acute Authorization Home Health   Home Health Status Set-up Complete

## 2020-08-19 NOTE — PROGRESS NOTES
[facility] Note    CC: Hematochezia    HPI 79 y.o. male with history of HTN, dementia, CVA with right sided hemiparesis, seizures, ESRD on HD (MWF), Afib (on Eliquis) who presented from home after caregiver found patient in bed with dark maroon colored blood per rectum without associated abdominal pain.     H/H on admisson 10.1/31 from 9.4/27.8 in May 2019. INR 1.0. K 6.1, Cr 6.5. Nephrology consulted and planning dialysis due to hyperkalemia. Last dose of Eliquis reported yesterday.      No further episodes of bleeding noted after patient was admitted to ICU.     Colonoscopy performed 2018 with non-bleeding internal hemorrhoids, diverticulosis in sigmoid and descending colon, five small polyps removed in transverse colon. TI normal. Recommended 5 year follow up.      Medical records reviewed. Additional history supplemented by nursing.     INTERVAL HISTORY:  Since yesterday, no further bleeding.  H/H stable.  Tolerating PO.  Bleeding was likely secondary to diverticulosis and has ceased/not recurred.  No new issues.    Past Medical History:   Diagnosis Date    NICK (acute kidney injury) 7/29/2016    Allergy     Anemia, mild 12/15/2014    Arthritis     Gout    Atrial fibrillation 03/2019    Benign essential HTN 3/27/2012    BMI 29.0-29.9,adult 5/10/2018    BPH (benign prostatic hyperplasia)     BPH (benign prostatic hyperplasia)     CAD (coronary artery disease) 2006    Carotid artery occlusion     60-70% FROYLAN, LICA < 50%    Chronic kidney disease     due to ibuprofen    Colon polyp     CRF (chronic renal failure), stage 5     Diabetes mellitus     Diverticulosis     ESRD (end stage renal disease) on dialysis     Gastritis     GERD (gastroesophageal reflux disease)     Gout     History of colon polyps 5/3/2018    HTN (hypertension) 3/27/2012    Hyperlipidemia     Hyperlipidemia     LLL pneumonia 6/14/2018    LVH (left ventricular hypertrophy)     Mesenteric ischemia      "Murmur, cardiac 3/27/2012    GRICELDA (obstructive sleep apnea)     DOES NOT USE A MACHINE    Sinus problem     Stroke 03/2019    acute left PCA    Syncope and collapse          Review of Systems  General ROS: negative for - chills, fever or weight loss  Cardiovascular ROS: no chest pain or dyspnea on exertion  Gastrointestinal ROS: no further bleeding    Physical Examination  /75   Pulse 89   Temp 96.8 °F (36 °C)   Resp 20   Ht 6' 1" (1.854 m)   Wt 78.5 kg (173 lb)   SpO2 97%   BMI 22.82 kg/m²   General appearance: alert, cooperative, no distress  HENT: Normocephalic, atraumatic, neck symmetrical, no nasal discharge, sclera anicteric   Lungs: clear to auscultation bilaterally, symmetric chest wall expansion bilaterally  Heart: regular rate and rhythm without rub; no displacement of the PMI   Abdomen: soft, NT  Extremities: extremities symmetric; no clubbing, cyanosis, or edema  Neurologic: Alert and oriented X 3, no sensory or motor neurologic deficits      Labs:  Lab Results   Component Value Date    WBC 7.62 08/19/2020    HGB 9.8 (L) 08/19/2020    HCT 30.5 (L) 08/19/2020     (H) 08/19/2020     08/19/2020               Assessment:   1.  CVA  2.  Dementia  3.  Anticoagulation  4.  Hematochezia - likely secondary to diverticular bleed    Plan:  1.  PPI  2.  Diet as tolerated  3.  Monitor for rebleeding.  If bleed recurs, then recommend imaging study to localize source  4.  OK to discharge from GI standpoint.  GI to sign off.  Call for questions.    Messi Madrid MD  [facility]  [address], [address]  [place] [address]  Office: [phone]  Fax: [phone]    "

## 2020-08-19 NOTE — PROGRESS NOTES
"[facility]  Adult Nutrition  Progress Note    SUMMARY   Intervention: collaboration with care providers, enteral nutrition therapy    Recommendations    Recommendation:  1) Advance PO diet to goal of DM 2000 kcal, renal diet, texture per SLP if medically able   2) Continue TF nocturnal Novasource renal @ 60 ml/hr x 12 hr ( 7PM-7AM) + min 30 ml flush q 4 hr for tube patency to meet ~50% of needs ( 1440 (58% EEN), 65 g protein ( 66% EPN), and 518 ml free water)   -If NPO at RD f/u change TF to continuous Novasource renal @ 50 ml/hr + 105  ml flush q 4 hr  (providing 2400 kcals (98% EEN), 108 g (100% EPN), and 864 ml free water)    3) Add novasource renal BID PO   4) weigh pt weekly    Goals: 1) PO diet and TF initiated by f/u  Nutrition Goal Status: progressing towards goal, goal met  Communication of RD Recs: (POC, sticky note, second sign reviewed with MD)    Reason for Assessment    Reason For Assessment: RD follow-up  Diagnosis: (GI hemorrhage)  Relevant Medical History: CHF, AFIB, anemia, obesity, HTN, HLD, DM2, ESRD on HD, dementia, stroke, PEG, COPD, CAD  Interdisciplinary Rounds: did not attend  General Information Comments: 78 y/o male admitted s/p 2 days abd. pain. Per MD plan for GI to scope and then SLP consult for diet advancement as pt was eating, "soft foods," during the day PO and receiving TF x 12 hours overnight via PEG. Previous formula not in chart. Unable to perform NPE as pt with other care providers, will perform at f/u. No wt loss per chart review.  8.19.20- Pt still NPO per MD orders. NFPE completed on 8/19, had moderate muscle wasting and fat loss. No complaints of n/v.  Nutrition Discharge Planning: to be determined- DM 1800 kcal, renal diet texture per SLP + nocturnal TF above    Nutrition Risk Screen    Nutrition Risk Screen: dysphagia or difficulty swallowing, tube feeding or parenteral nutrition    Nutrition/Diet History    Spiritual, Cultural Beliefs, Shinto " "Practices, Values that Affect Care: no  Food Allergies: NKFA  Factors Affecting Nutritional Intake: NPO, altered gastrointestinal function    Anthropometrics    Temp: 96.5 °F (35.8 °C)  Height Method: Measured(8/3/20 office)  Height: 6' 1"  Height (inches): 73 in  Weight Method: Bed Scale  Weight: 78.5 kg (173 lb)  Weight (lb): 173 lb  Ideal Body Weight (IBW), Male: 184 lb  % Ideal Body Weight, Male (lb): 94.02 %  BMI (Calculated): 22.8  BMI Grade: 18.5-24.9 - normal  Usual Body Weight (UBW), k.8 kg  % Usual Body Weight: 99.84  % Weight Change From Usual Weight: -0.37 %       Lab/Procedures/Meds    Pertinent Labs Reviewed: reviewed  BMP  Lab Results   Component Value Date     2020    K 4.1 2020    CL 97 2020    CO2 26 2020    BUN 44 (H) 2020    CREATININE 6.6 (H) 2020    CALCIUM 8.8 2020    ANIONGAP 13 2020    ESTGFRAFRICA 8 (A) 2020    EGFRNONAA 7 (A) 2020     Lab Results   Component Value Date    IRON 31 (L) 2019    TIBC 295 2019    FERRITIN 469 (H) 2019     Lab Results   Component Value Date    CALCIUM 8.8 2020    PHOS 4.0 2020     POCT Glucose   Date Value Ref Range Status   2020 111 (H) 70 - 110 mg/dL Final   2020 72 70 - 110 mg/dL Final   2020 76 70 - 110 mg/dL Final   2020 84 70 - 110 mg/dL Final   2020 87 70 - 110 mg/dL Final   2020 91 70 - 110 mg/dL Final   2020 117 (H) 70 - 110 mg/dL Final   2020 103 70 - 110 mg/dL Final   2020 148 (H) 70 - 110 mg/dL Final   2020 100 70 - 110 mg/dL Final     Lab Results   Component Value Date    HGBA1C 8.3 (H) 2019       Pertinent Medications Reviewed: reviewed  Pertinent Medications Comments: pantoprazole, atrovastatin    Estimated/Assessed Needs    Weight Used For Calorie Calculations: 81.5 kg (179 lb 10.8 oz)  Energy Calorie Requirements (kcal): 30-35 kcal/kg ( HD) = 6909-8588  Energy Need Method: " Kcal/kg  Protein Requirements: 1.2-1.5 g protein/kg ( HD/ critical care) =  g protein  Weight Used For Protein Calculations: 81.5 kg (179 lb 10.8 oz)  Fluid Requirements (mL): urine output + 1000 ml  Estimated Fluid Requirement Method: other (see comments)  RDA Method (mL): urine output + 1000 ml or 1500 ml  CHO Requirement: 275-306 g      Nutrition Prescription Ordered    Current Diet Order: NPO x 3 days + TF    Evaluation of Received Nutrient/Fluid Intake    Energy Calories Required: not meeting needs  Protein Required: not meeting needs  Fluid Required: not meeting needs  Tolerance: other (see comments)(NPO)  % Intake of Estimated Energy Needs: 25 - 50 %  % Meal Intake: NPO    Nutrition Risk    Level of Risk/Frequency of Follow-up: moderate 2 x weekly     Assessment and Plan    Inadequate energy intake  R/t NPO   As evidenced by PO intakes < 50% EEN x 3 day  Intervention: above  continues       Monitor and Evaluation    Food and Nutrient Intake: energy intake  Food and Nutrient Adminstration: diet order, enteral and parenteral nutrition administration  Anthropometric Measurements: weight  Biochemical Data, Medical Tests and Procedures: electrolyte and renal panel, gastrointestinal profile, glucose/endocrine profile  Nutrition-Focused Physical Findings: overall appearance, extremities, muscles and bones     Malnutrition Assessment     Skin (Micronutrient): (Patrick = 16)  Musculoskeletal/Lower Extremities: knock-kneed   Micronutrient Evaluation: suspected deficiency, suspected toxicity  Micronutrient Evaluation Comments: check iron, K toxicity   Subcutaneous Fat (Malnutrition): moderate depletion  Muscle Mass (Malnutrition): moderate depletion   Orbital Region (Subcutaneous Fat Loss): moderate depletion  Upper Arm Region (Subcutaneous Fat Loss): moderate depletion  Thoracic and Lumbar Region: moderate depletion   [place] Region (Muscle Loss): moderate depletion  Clavicle Bone Region (Muscle Loss): severe  depletion  Clavicle and Acromion Bone Region (Muscle Loss): moderate depletion  Scapular Bone Region (Muscle Loss): (Unable to obtain)  Dorsal Hand (Muscle Loss): mild depletion  Patellar Region (Muscle Loss): moderate depletion  Anterior Thigh Region (Muscle Loss): moderate depletion  Posterior Calf Region (Muscle Loss): moderate depletion   Edema (Fluid Accumulation): 0-->no edema present   Subcutaneous Fat Loss (Final Summary): moderate protein-calorie malnutrition  Muscle Loss Evaluation (Final Summary): moderate protein-calorie malnutrition         Nutrition Follow-Up    RD Follow-up?: Yes

## 2020-08-19 NOTE — PT/OT/SLP PROGRESS
"Physical Therapy Treatment    Patient Name:  Micheal Hunt   MRN:  [id number]    Recommendations:     Discharge Recommendations:  home health PT   Discharge Equipment Recommendations: none   Barriers to discharge: None    Assessment:     Micheal Hunt is a 79 y.o. male admitted with a medical diagnosis of Gastrointestinal hemorrhage.  He presents with the following impairments/functional limitations:  weakness, impaired endurance, impaired self care skills, impaired functional mobilty, gait instability, impaired balance, impaired cognition, decreased lower extremity function, decreased upper extremity function Pt alert and following directions well. Pt performed thera ex x20 AP, QS, SLR. Pt able to sit EOB and stand - took few steps to transfer to chair mod A with hand held assist. Pt OOB to chair post PT.     Rehab Prognosis: Fair; patient would benefit from acute skilled PT services to address these deficits and reach maximum level of function.    Recent Surgery: Procedure(s) (LRB):  COLONOSCOPY (N/A) 1 Day Post-Op    Plan:     During this hospitalization, patient to be seen 6 x/week to address the identified rehab impairments via gait training, therapeutic activities, therapeutic exercises and progress toward the following goals:    · Plan of Care Expires:  09/30/20    Subjective   Pt reported being tired prior to PT.  Pt stated he was going home today and ready to leave hospital  Pt stated he was hungry because he "had not eaten in 4 days"  Chief Complaint: go home  Patient/Family Comments/goals: to go home today  Pain/Comfort: none stated   ·        Objective:     Communicated with nurse Dubon prior to session.  Patient found HOB elevated with PEG Tube, blood pressure cuff, bed alarm, pulse ox (continuous), telemetry upon PT entry to room.     General Precautions: Standard, fall, seizure   Orthopedic Precautions:N/A   Braces:       Functional Mobility:  · Bed Mobility:     · Scooting: minimum " assistance  · Sit to Supine: moderate assistance  · Transfers:     · Sit to Stand:  moderate assistance with hand-held assist  · Bed to Chair: moderate assistance with  hand-held assist  using  Stand Pivot  · Gait: pt took few steps to transfer EOB to chair mod A with hand-held assist  · Balance: Fair      AM-PAC 6 CLICK MOBILITY          Therapeutic Activities and Exercises:   Patient was educated on the importance of OOB activity and functional mobility to negate negative effects of prolonged bed rest during hospitalization, safe transfers and ambulation, and D/C planning     Thera ex x20 AP, QS, SLR  Pt OOB to chair post PT    Patient left up in chair with all lines intact, call button in reach, chair alarm on and CNMIGUEL Kendra present..    GOALS:   Multidisciplinary Problems     Physical Therapy Goals        Problem: Physical Therapy Goal    Goal Priority Disciplines Outcome Goal Variances Interventions   Physical Therapy Goal     PT, PT/OT Ongoing, Progressing     Description: Goals to be met by:      Patient will increase functional independence with mobility by performin. Supine to sit with MInimal Assistance  2. Sit to stand transfer with Minimal Assistance  3. Bed to chair transfer with Minimal Assistance  4. Gait  x 50 feet with Minimal Assistance using Rolling Walker.   5. Lower extremity exercise program x20 reps                     Time Tracking:     PT Received On: 20  PT Start Time: 1131     PT Stop Time: 1155  PT Total Time (min): 24 min     Billable Minutes: Therapeutic Activity 12 and Therapeutic Exercise 12       PT/PTA: PT     PTA Visit Number: 0     Malaika Cerrato, PT  2020

## 2020-08-19 NOTE — PLAN OF CARE
A&O to self only. Pt educated to call for assistance. Plan of care discussed with patient, no evidence of learning noted. Telesitter at bedside. Comfort level maintained. Pt remains free from fall/injury throughout shift. Telemetry on and being monitored. Pt has a hx of A-fib and is taking elequis. NPO maintained per MD orders. Pt receives Nova source Renal continuous tube feeding at 60ml/hr with 30ml flush every 4 hours from 8533-0426 per orders. Pt tolerates well. Placement verified before medication administration. Blood glucose levels monitored per orders. Bed in lowest position, wheels locked, side rails up x2, and call light within reach. No distress noted at this time. Will Continue to observe.

## 2020-08-19 NOTE — PT/OT/SLP EVAL
Speech Language Pathology Evaluation  Bedside Swallow    Patient Name:  Micheal Hunt   MRN:  [id number]  Admitting Diagnosis: Gastrointestinal hemorrhage    Recommendations:                 General Recommendations:  Follow-up not indicated  Diet recommendations:  Puree(PLEASURE FEEDS), Thin(SMALL AMOUNTS FOR PLEASURE ONLY; NO STRAWS)   Aspiration Precautions: Puree for pleasure   General Precautions: Standard, aspiration, fall  Communication strategies:  none    History:     Past Medical History:   Diagnosis Date    NICK (acute kidney injury) 7/29/2016    Allergy     Anemia, mild 12/15/2014    Arthritis     Gout    Atrial fibrillation 03/2019    Benign essential HTN 3/27/2012    BMI 29.0-29.9,adult 5/10/2018    BPH (benign prostatic hyperplasia)     BPH (benign prostatic hyperplasia)     CAD (coronary artery disease) 2006    Carotid artery occlusion     60-70% FROYLAN, LICA < 50%    Chronic kidney disease     due to ibuprofen    Colon polyp     CRF (chronic renal failure), stage 5     Diabetes mellitus     Diverticulosis     ESRD (end stage renal disease) on dialysis     Gastritis     GERD (gastroesophageal reflux disease)     Gout     History of colon polyps 5/3/2018    HTN (hypertension) 3/27/2012    Hyperlipidemia     Hyperlipidemia     LLL pneumonia 6/14/2018    LVH (left ventricular hypertrophy)     Mesenteric ischemia     Murmur, cardiac 3/27/2012    GRICELDA (obstructive sleep apnea)     DOES NOT USE A MACHINE    Sinus problem     Stroke 03/2019    acute left PCA    Syncope and collapse        Past Surgical History:   Procedure Laterality Date    APPENDECTOMY      CARDIAC CATHETERIZATION  2012    LIMA to LAD patent, SVG to RCA    COLONOSCOPY  2011    COLONOSCOPY N/A 5/3/2018    Procedure: COLONOSCOPY;  Surgeon: Messi Harris MD;  Location: [facility];  Service: Endoscopy;  Laterality: N/A;    COLONOSCOPY N/A 8/18/2020    Procedure: COLONOSCOPY;  Surgeon: Messi Harris,  MD;  Location: [facility] ENDO;  Service: Endoscopy;  Laterality: N/A;    CORONARY ARTERY BYPASS GRAFT  4/2007    x 2 [place]    ESOPHAGOGASTRODUODENOSCOPY N/A 8/17/2020    Procedure: EGD (ESOPHAGOGASTRODUODENOSCOPY);  Surgeon: Eben Soto MD;  Location: [facility] ENDO;  Service: Endoscopy;  Laterality: N/A;    JOINT REPLACEMENT      left knee total replacement  X 3    mid leftt finger      from a cactuss    MOLE REMOVAL  2016    rotative cuff      no rotative cuffs on bilat shoulders has pins     SHOULDER SURGERY      shoulder surgery bilat  RIGHT X 4; LEFT X 3       Social History: Patient lives alone with caregiver 12 hours per day. Daughter lives on same property.    Modified Barium Swallow 2/6/2020: The patient presents with mild oral dysphagia, mild to severe pharyngeal dysphagia, and esophageal dysphagia.       Recommendations: Regular consistencies (IDDSI 7) and thin-honey thick liquids (IDDSI 3). Thicken all liquids to thin honey thickness including liquid on food (soups, milk on cereal, etc) and liquid medications. Avoid dry, crumbly foods. Fresh fruits may be consumed; however, avoid melons. NO STRAWS.      Modified Barium Swallow Study 3/11/2020: Pt presents with severe oropharyngeal dysphagia marked by severely compromised airway protection.  Laryngeal penetration   and silent aspiration observed w/ all trials before, during, and   after swallow.  Pt currently remains inappropriate for PO intake   and will require alternative means of hydration/nutrition.    Results and recommendations were discussed w/ RN, MD, and pt's daughter; all verbalized understanding of all information   discussed.      Chest X-Rays: There is moderate enlargement of the cardiac silhouette, unchanged, status post median sternotomy.  The aorta is ectatic and calcified.  The lungs are clear and well inflated.  There is no pleural effusion, pneumothorax or acute bony abnormality    Prior diet: DaughterTonya reports pureed  "textures at home for pleasure only (applesauce/yogurt) and thin liquids for pleasure. Continuous PEG feedings at night for primary means of nutrition.     Occupation/hobbies/homemaking: Caregiver 8AM-8PM    Subjective   "A lady comes in and stays til 8:00."    Pain/Comfort:  · Pain Rating Post-Intervention 2: 0/10    Objective:   Pt seen for clinical swallow evaluation. He is alert, pleasant and cooperative. Oriented to self and place only. Poor historian. Chart reviewed. Pt with h/o chronic dysphagia with aspiration. Spoke with daughter, Tonya, via telephone. She [facility] Recalls findings that indicate pt aspirates "everything" but does better with thin liquids and purees. She is aware of risks of aspiration and allow pt to consume pureed textures and thin liquids for pleasure.     Oral Musculature Evaluation  · Oral Musculature: WFL  · Dentition: present and adequate(missing lower back teeth)  · Secretion Management: adequate  · Mucosal Quality: adequate  · Mandibular Strength and Mobility: WFL  · Oral Labial Strength and Mobility: WFL  · Lingual Strength and Mobility: WFL  · Volitional Cough: adequate  · Volitional Swallow: able to palpate laryngeal rise; delayed  · Voice Prior to PO Intake: clear quality, mild dysarthria    Bedside Swallow Eval:   Consistencies Assessed:  · Thin liquids --via tsp  · Honey thick liquids --via tsp and cup  · Puree --applesauce  · Mixed consistencies --diced pears     Oral Phase:   · WFL    Pharyngeal Phase:   · Coughing/choking--delayed cough noted with thin via tsp. Reactive cough with diced pear trials and 1 of 3 puree trials. Delayed cough following self fed cup sips of honey thick liquids  · delayed swallow initation--across all trials  · multiple spontaneous swallows--across all trials  · throat clearing--with thin and pureed textures  · Audible swallow    Compensatory Strategies  · None    Treatment: Spoke with daughterTonya, via " telephone she wishes to continue pureed pleasure feeds    Assessment:     Micheal Hunt is a 79 y.o. male with an SLP diagnosis of chronic Dysphagia.  He presents with continued s/s airway threat across all PO trials. Pt with known h/o aspiration. REC he continue PEG feeding for primary means of nutrition. Continue pleasure feeds of pureed textures and thin liquids. NO STRAWS. Meals trays should not be sent to patients room. Pleasure feeds at pt request.     Goals:   Multidisciplinary Problems     SLP Goals     Not on file          Multidisciplinary Problems (Resolved)        Problem: SLP Goal    Goal Priority Disciplines Outcome   SLP Goal   (Resolved)     SLP Met                   Plan:     · Patient to be seen:      · Plan of Care expires:     · Plan of Care reviewed with:  patient, daughter   · SLP Follow-Up:  No       Discharge recommendations:      Barriers to Discharge:  None    Time Tracking:     SLP Treatment Date:   08/19/20  Speech Start Time:  0953  Speech Stop Time:  1015     Speech Total Time (min):  22 min    Billable Minutes: Eval Swallow and Oral Function 22 and Total Time 22    Khushi Ulloa CCC-SLP  08/19/2020

## 2020-08-19 NOTE — PLAN OF CARE
I sent the pts HH orders and HH packet to [facility] via [employer]. AVS updated. Viktoria Cm, KATYAW     08/19/20 1207   Post-Acute Status   Post-Acute Authorization Home Health   Home Health Status Referrals Sent   Patient choice form signed by patient/caregiver List with quality metrics by geographic area provided

## 2020-08-20 NOTE — TELEPHONE ENCOUNTER
"Spoke to HH regarding this matter, new HH order needs to be placed. Please note to have "NP visit for hospital f/u"  Please also list a skill or other services patient may need. Pended.  "

## 2020-08-20 NOTE — TELEPHONE ENCOUNTER
Patients daughter [phone]    Released from [facility] last night.     They wanted patient to follow up in a week  Dr Lakhani had seen this patient recently. They were told a NP could come to home from [place].   Some of his meds have been being held until he is seen.   Can the NP come to home asap so he does not have to come into the clinic?     Patient Has home health.  Needs f/u asap  Please advise.

## 2020-08-20 NOTE — DISCHARGE SUMMARY
[facility] Hospital Medicine  Discharge Summary      Patient Name: Micheal Hunt  MRN: [id number]  Admission Date: 8/16/2020  Hospital Length of Stay: 3 days  Discharge Date and Time: 8/19/2020  8:55 PM  Attending Physician: Annita att. providers found   Discharging Provider: Linda Baig MD  Primary Care Provider: Pk Lakhani MD      HPI:   Patient is a 79-year-old male with history of dementia, left-sided stroke with right-sided residual weakness, atrial fibrillation on Eliquis, peg tube, COPD, hypertension, ESRD on Monday Wednesday Friday HD.  Patient is usually cared for by a caregiver during the day 8-8 p.m..  Patient unable to provide much history so history provided in large part by the caregiver.  She states he has been more fatigued over the past 2 days.  He has not complained of abdominal pain.  Has not had any nausea or vomiting.  She left him in bed last night at 8:00 p.m. as usual and when she arrived this morning she found units of dark maroon-colored blood in his bed and diaper.  She reports no further bleeding wants she cleaned month.  He has not received any of his medications today.  Last dose of Eliquis was yesterday.  She reports he has a history of diverticulitis.  He has not missed any dialysis treatments.  She states he eats by mouth during the day and has 12 hr of continuous PEG feeds at night.  Hemoglobin 10.1 in the ED.  Potassium 6.1.  I discussed the case with Nephrology and GI.  Plan to dialyze today and perform EGD in a.m..        Procedure(s) (LRB):  COLONOSCOPY (N/A)      Hospital Course:   Patient was admitted to the hospital medicine service for dark maroon-colored blood per rectum.  His H&H was closely monitored and GI was consulted.  His aspirin and Eliquis were held.  Nephrology was consulted because of hyperkalemia.  He received extra session of dialysis and then was resumed on his usual hemodialysis schedule.  EGD was done which showed findings  including normal esophagus, gastritis, submucosal nodule in gastric antrum which was biopsied, normal examined duodenum.  Colonoscopy showed no old blood or active bleeding.  GI thought the likely source of prior bleeding is diverticular versus hemorrhoidal.  He was okay to resume his diet and medications including aspirin and Eliquis.  He will follow with GI who will schedule a repeat EUS as an outpatient for evaluation of gastric antral nodule.    Per Nephrology recommendations, Norvasc and Lasix were held on discharge.     Consults:   Consults (From admission, onward)        Status Ordering Provider     Inpatient consult to Gastroenterology  Once     Provider:  Eben Soto MD    Completed JULIETH LINDER     Inpatient consult to Registered Dietitian/Nutritionist  Once     Provider:  (Not yet assigned)    Completed JULIETH LINDER          No new Assessment & Plan notes have been filed under this hospital service since the last note was generated.  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    Specialty: Family Medicine  Contact information:  [address]  [place] [address] [phone]             Please  follow up.    Why: [facility] for nephrology follow up and HD           [facility] [place].    Specialties: Hospice Services, Home Health Services  Why: Home Health  Contact information:  [address]  [place] [address]  [phone]                 Patient Instructions:      Ambulatory referral/consult to [facility]   Standing Status: Future   Referral Priority: Routine Referral Type: Consultation   Referral Reason: Specialty Services Required   Number of Visits Requested: 1     Diet Dysphagia Pureed     Notify your health care provider if you experience any of the following:  increased confusion or weakness     Notify your health care provider if you experience any of the following:  persistent dizziness, light-headedness, or visual disturbances     Notify your health care provider if you experience any of the following:  temperature >100.4     SUBSEQUENT HOME HEALTH ORDERS   Order Comments: Resume prior home health orders     Order Specific Question Answer Comments   What Home Health Agency is the patient currently using? Other/External      Activity as tolerated       Significant Diagnostic Studies: Labs:   BMP:   Recent Labs   Lab 08/19/20  0433   *      K 4.1   CL 97   CO2 26   BUN 44*   CREATININE 6.6*   CALCIUM 8.8    and CMP   Recent Labs   Lab 08/19/20  0433      K 4.1   CL 97   CO2 26   *   BUN 44*   CREATININE 6.6*   CALCIUM 8.8   ALBUMIN 3.3*   ANIONGAP 13   ESTGFRAFRICA 8*   EGFRNONAA 7*     Radiology Results (last 7 days)    Procedure Component Value Units Date/Time   X-Ray Chest 1 View [[id number]] Resulted: 08/16/20 1252   Order Status: Completed Updated: 08/16/20 1254   Narrative:     EXAMINATION:   XR CHEST 1 VIEW     CLINICAL HISTORY:   Hyperkalemia     TECHNIQUE:   Single frontal view of the chest was performed.     COMPARISON:   04/02/2019     FINDINGS:   There is moderate enlargement of the cardiac silhouette, unchanged, status  post median sternotomy.  The aorta is ectatic and calcified.  The lungs are clear and well inflated.  There is no pleural effusion, pneumothorax or acute bony abnormality.    Impression:       Stable cardiomegaly.  No acute pulmonary abnormality.       Electronically signed by: Marie Edgar   Date: 08/16/2020   Time: 12:52     Upper GI endoscopy  Order: [id number]  Status:  Final result   Visible to patient:  Yes (Patient Portal) Next appt:  11/04/2020 at 10:00 AM in Family Medicine (Davin Pagan NP)     Narrative  Performed by: [facility]   Patient Name: Micheal Hunt   Procedure Date: 8/17/2020 11:17 AM   MRN: [id number]   Account Number: [id number]   YOB: 1940   Admit Type: Inpatient   Age: 79   Room: ICU   Gender: Male   Attending MD: Eben Soto MD   Procedure:           Upper GI endoscopy   Indications:         Hematochezia   Providers:           Eben Soto MD, Komal Fontana RN, Rosa Perez, Technician (Technician)   Referring MD:        Eben Soto MD (Referring MD)   Complications:       No immediate complications.   Medicines:           General Anesthesia   Procedure:           Pre-Anesthesia Assessment:                        - Prior to the procedure, a History and Physical was                        performed, and patient medications, allergies and                        sensitivities were reviewed. The patient's tolerance                        of previous anesthesia was reviewed.                        - The risks and benefits of the procedure and the                        sedation options and risks were discussed with the                        patient. All questions were answered and informed                        consent was obtained.                        - Patient identification and proposed procedure were                        verified prior to the procedure by the physician.                         The procedure was verified in the pre-procedure area.                        - Pre-procedure physical examination revealed no                        contraindications to sedation.                        - ASA Grade Assessment: III - A patient with severe                        systemic disease.                        - After reviewing the risks and benefits, the                        patient was deemed in satisfactory condition to                        undergo the procedure.                        - General anesthesia under the supervision of a CRNA                        was determined to be medically necessary for this                        procedure based on review of the patient's medical                        history, medications, and prior anesthesia history.                        After obtaining informed consent, the endoscope was                        passed under direct vision. Throughout the                        procedure, the patient's blood pressure, pulse, and                        oxygen saturations were monitored continuously. The                        Olympus scope GIF- ([id number]) was introduced                        through the mouth, and advanced to the second part                        of duodenum. The upper GI endoscopy was accomplished                        without difficulty. The patient tolerated the                        procedure well.   Findings:        The examined esophagus was normal.        Patchy moderately erythematous mucosa without bleeding was found in        the gastric antrum. Biopsies were taken with a cold forceps for        Helicobacter pylori testing. Verification of patient identification        for the specimen was done. Estimated blood loss was minimal.        A single 10 mm submucosal papule (nodule) with no bleeding and no        stigmata of recent bleeding was found in the gastric antrum.        Biopsies were taken with a cold forceps for  histology. Verification        of patient identification for the specimen was done. Estimated blood        loss was minimal.        A hemoclip was found in the gastric body.        The duodenal bulb, first portion of the duodenum and second portion        of the duodenum were normal.        There was evidence of an intact balloon gastrostomy with a patent        G-tube present in the gastric body. This was characterized by        healthy appearing mucosa.   Impression:          - 78 y/o male with history of HTN, dementia, CVA                        with right sided hemiparesis, seizures, ESRD on HD                        (MWF), Afib (on Eliquis) who presented from home                        with hematochezia; upper endoscopy without stigmata                        of recent blood loss                        - Erythematous mucosa in the antrum. Biopsied.                        - A single submucosal papule (nodule) found in the                        antrum, seen on prior EUS. Biopsied.                        - Normal duodenal bulb, first portion of the                        duodenum and second portion of the duodenum.   Recommendation:      - [facility] patient [facility] for [facility] care.                        - Clear liquid diet.                        - Continue present medications.                        - Await pathology results.                        - Refer for EUS as outpatient to evaluate gastric                        antral nodule as recommended previously                        - Colonoscopy tomorrow for evaluation of hematochezia                        - Clear liquid diet                        - Golytely split prep starting this evening   Procedure Code(s):        --- Professional ---        46879, Esophagogastroduodenoscopy, flexible, transoral; with biopsy,        single or multiple   Diagnosis Code(s):        --- Professional ---        K31.89, Other diseases of stomach and duodenum         K92.1, Melena (includes Hematochezia)   CPT copyright 2019 [employer]. All rights reserved.   The codes documented in this report are preliminary and upon  review   may be revised to meet current compliance requirements.   Eben Soto MD   8/17/2020 12:12:29 PM   This report has been verified and signed electronically.   Number of Addenda: 0   Note Initiated On: 8/17/2020 11:17 AM   Estimated Blood Loss:        Estimated blood loss: none.        This report has been verified and signed electronically.      Specimen Collected: 08/17/20 11:17 Last Resulted: 08/17/20 12:13 Order Details View Encounter Lab and Collection Details Routing Result History         Linked Documents    View Image           Colonoscopy  Order: [id number]  Status:  Final result   Visible to patient:  Yes (Patient Portal) Next appt:  11/04/2020 at 10:00 AM in Family Medicine (Davin Pagan NP)     Narrative  Performed by: [facility]   Patient Name: Micheal Hunt   Procedure Date: 8/18/2020 11:57 AM   MRN: [id number]   Account Number: [id number]   YOB: 1940   Admit Type: Inpatient   Age: 79   Room: ICU   Gender: Male   Attending MD: Messi Madrid MD   Procedure:           Colonoscopy   Indications:         Last colonoscopy: May 2018, Hematochezia   Providers:           Messi Madrid MD, Veronica Pulido RN, Oscar Burks, Technician (Technician)   Referring MD:        Messi Madrid MD (Referring MD)   Complications:       No immediate complications.   Medicines:           Propofol per Anesthesia   Procedure:           Pre-Anesthesia Assessment:                        - Prior to the procedure, a History and Physical was                        performed, and patient medications and allergies                        were reviewed. The patient's tolerance of previous                        anesthesia was also reviewed. The risks and benefits                         of the procedure and the sedation options and risks                        were discussed with the patient. All questions were                        answered, and informed consent was obtained. Prior                        Anticoagulants: The patient has taken no previous                        anticoagulant or antiplatelet agents. ASA Grade                        Assessment: IV - A patient with severe systemic                        disease that is a constant threat to life. After                        reviewing the risks and benefits, the patient was                        deemed in satisfactory condition to undergo the                        procedure.                        After I obtained informed consent, the scope was                        passed under direct vision. Throughout the                        procedure, the patient's blood pressure, pulse, and                        oxygen saturations were monitored continuously.                        The Olympus scope [id number]-[id number] ([id number]) was                        introduced through the anus and advanced to 10 cm                        into the ileum. The colonoscopy was unusually                        difficult due to dolichocolon, a redundant colon,                        significant looping and a tortuous colon. Successful                        completion of the procedure was aided by increasing                        the dose of sedation medication, Anesthesia staff                        assisting with sedation, withdrawing and reinserting                        the scope, straightening and shortening the scope to                        obtain bowel loop reduction, using scope torsion,                        applying abdominal pressure and receiving assistance                        from additional staff. The patient tolerated the                        procedure well. The quality of the bowel preparation                         was excellent. The terminal ileum, ileocecal valve,                        appendiceal orifice, and rectum were photographed.   Findings:        The perianal and digital rectal examinations were normal.        Non-bleeding internal hemorrhoids were found during retroflexion.        The hemorrhoids were large.        Many small and large-mouthed diverticula were found in the entire        colon.        Two sessile polyps were found in the transverse colon. The polyps        were 5 to 8 mm in size. These polyps were removed with a cold snare.        Resection and retrieval were complete.        The cecum, appendiceal orifice and ileocecal valve appeared normal.        The terminal ileum appeared normal.   Impression:          - Non-bleeding internal hemorrhoids.                        - Diverticulosis in the entire examined colon.                        - Two 5 to 8 mm polyps in the transverse colon,                        removed with a cold snare. Resected and retrieved.                        - The cecum, appendiceal orifice and ileocecal valve                        are normal.                        - The examined portion of the ileum was normal.   Recommendation:      - Return patient to ICU for ongoing care.                        - Resume previous diet.                        - Continue present medications.                        - Await pathology results.                        - No repeat colonoscopy due to age.                        - Do a GI bleeding (tagged RBC) scan if symptoms                        persist.   Procedure Code(s):        --- Professional ---        62440, 22, Colonoscopy, flexible; with removal of tumor(s),        polyp(s), or other lesion(s) by snare technique   Diagnosis Code(s):        --- Professional ---        K64.8, Other hemorrhoids        K63.5, Polyp of colon        K92.1, Melena (includes Hematochezia)        K57.30, Diverticulosis of large intestine without perforation or         abscess without bleeding   CPT copyright 2019 American Medical Association. All rights reserved.   The codes documented in this report are preliminary and upon  review   may be revised to meet current compliance requirements.   Messi Madrid MD   8/18/2020 12:34:12 PM   This report has been verified and signed electronically.   Number of Addenda: 0   Note Initiated On: 8/18/2020 11:57 AM   Estimated Blood Loss:        Estimated blood loss was minimal.        This report has been verified and signed electronically.      Specimen Collected: 08/18/20 11:57 Last Resulted: 08/18/20 12:35 Order Details View Encounter Lab and Collection Details Routing Result History         Linked Documents    View Image             Pending Diagnostic Studies:     None         Medications:  Reconciled Home Medications:      Medication List      CHANGE how you take these medications    carvediloL 12.5 MG tablet  Commonly known as: COREG  Take 6.5 tablets (81.25 mg total) by mouth 2 (two) times daily.  What changed:   · how much to take  · how to take this     finasteride 5 mg tablet  Commonly known as: PROSCAR  Take 1 tablet (5 mg total) by mouth once daily.  What changed: how to take this        CONTINUE taking these medications    albuterol 90 mcg/actuation inhaler  Commonly known as: PROVENTIL/VENTOLIN HFA  2 puffs every 4 hours as needed for cough, wheeze, or shortness of breath     allopurinoL 100 MG tablet  Commonly known as: ZYLOPRIM  100 mg by FEEDING TUBE route once daily.     apixaban 2.5 mg Tab  Commonly known as: ELIQUIS  2.5 mg by FEEDING TUBE route 2 (two) times daily.     aspirin 81 MG EC tablet  Commonly known as: ECOTRIN  81 mg by FEEDING TUBE route once daily.     atorvastatin 40 MG tablet  Commonly known as: LIPITOR  1 tablet (40 mg total) by Per G Tube route once daily.     B complex-vitamin C-folic acid 800 mcg Chew  0.8 mg by Per G Tube route once daily.     fluticasone propionate 50 mcg/actuation nasal  spray  Commonly known as: FLONASE  2 sprays (100 mcg total) by Each Nare route once daily.     levETIRAcetam 100 mg/mL Soln  Commonly known as: KEPPRA  7.5 ml by PEG daily     melatonin 10 mg Tab  Take by mouth nightly.     mupirocin 2 % ointment  Commonly known as: BACTROBAN  Apply topically 3 (three) times daily.     NEPHRO-EMMETT RX 1- mg-mg-mcg Tab  Generic drug: vit b cmplx 3-fa-vit c-biotin 1- mg-mg-mcg  Take 1 tablet by mouth once daily.     * NEPRO CARB STEADY ORAL  240 mLs by FEEDING TUBE route 2 (two) times daily.     * NEPRO CARB STEADY ORAL  1,000 mLs by FEEDING TUBE route 2 (two) times daily.     pantoprazole 40 MG tablet  Commonly known as: PROTONIX  Take 1 tablet (40 mg total) by mouth once daily.     polyethylene glycol 17 gram Pwpk  Commonly known as: GLYCOLAX  Take 17 g by mouth.     pramoxine-hydrocortisone cream  Apply topically 3 (three) times daily.     senna-docusate 8.6-50 mg 8.6-50 mg per tablet  Commonly known as: PERICOLACE  Take 1 tablet by mouth.     tamsulosin 0.4 mg Cap  Commonly known as: FLOMAX  Take 1 capsule (0.4 mg total) by mouth once daily.     tiotropium bromide 1.25 mcg/actuation Mist  Commonly known as: SPIRIVA RESPIMAT  Inhale 2.5 mcg into the lungs once daily. Controller         * This list has 2 medication(s) that are the same as other medications prescribed for you. Read the directions carefully, and ask your doctor or other care provider to review them with you.            STOP taking these medications    amLODIPine 10 MG tablet  Commonly known as: NORVASC     bacitracin 500 unit/gram ointment     furosemide 40 MG tablet  Commonly known as: LASIX            Indwelling Lines/Drains at time of discharge:   Lines/Drains/Airways     Drain                 Gastrostomy/Enterostomy Gastrostomy tube w/ balloon -- days         Hemodialysis AV Fistula Left upper arm -- days                Time spent on the discharge of patient: 45 minutes  Patient was seen and examined on  the date of discharge and determined to be suitable for discharge.     Plan of care discussed with GI and Nephrology on day of discharge.  Both consultants clear patient for discharge from their standpoints    Linda Baig MD  Department of Hospital Medicine  [facility]

## 2020-08-20 NOTE — HOSPITAL COURSE
Patient was admitted to the hospital medicine service for dark maroon-colored blood per rectum.  His H&H was closely monitored and GI was consulted.  His aspirin and Eliquis were held.  Nephrology was consulted because of hyperkalemia.  He received extra session of dialysis and then was resumed on his usual hemodialysis schedule.  EGD was done which showed findings including normal esophagus, gastritis, submucosal nodule in gastric antrum which was biopsied, normal examined duodenum.  Colonoscopy showed no old blood or active bleeding.  GI thought the likely source of prior bleeding is diverticular versus hemorrhoidal.  He was okay to resume his diet and medications including aspirin and Eliquis.  He will follow with GI who will schedule a repeat EUS as an outpatient for evaluation of gastric antral nodule.    Per Nephrology recommendations, Norvasc and Lasix were held on discharge.

## 2020-08-20 NOTE — PATIENT INSTRUCTIONS
When You Have Gastrointestinal (GI) Bleeding    Blood in your vomit or stool can be a sign of gastrointestinal (GI) bleeding. GI bleeding can be scary. But the cause may not be serious. You should always see a doctor if GI bleeding occurs.  The GI tract  The GI tract is the path through which food travels in the body. Food passes from the mouth down the esophagus (the tube from the mouth to the stomach). Food begins to break down in the stomach. It then moves through the duodenum, the first part of the small intestine. Nutrients are absorbed as food travels through the small intestine. What is left passes into the colon (large intestine) as waste. The colon removes water from the waste. Waste continues from the colon to the rectum (where stool is stored). Waste then leaves the body through the anus.  Causes of GI bleeding  GI bleeding can be caused by many different problems. Some of the more common causes include:  · Swollen veins in the anus (hemorrhoids)  · Swollen veins in the esophagus (varices)  · Sore on the lining of the GI tract (ulcer)  · Cuts or scrapes in the mouth or throat  · Infection caused by germs such as bacteria or parasites  · Food allergies, such as milk allergy in young children  · Medicines  · Inflammation of the GI tract (gastritis or esophagitis)  · Colitis (Crohn's disease or ulcerative colitis)  · Cancer (tumors or polyps)  · Abnormal pouches in the colon (diverticula)  · Tears in the esophagus or anus  · Nosebleed  · Abnormal blood vessels in the GI tract (angiodysplasia)  Diagnosing the cause of blood in stool  If blood is coming out in your stool, you may have a lower GI tract problem or a very fast upper GI tract bleed. Bleeding from the GI tract can be bright red. Or it may look dark and tarry. Tests may also find blood in your stool that cant be seen with the eye (occult blood). To find out the cause, tests that may be ordered include:  · Blood tests. A blood sample is taken and  sent to a lab for exam.  · Hemoccult test. Checks a stool sample for blood.  · Stool culture. Checks a stool sample for bacteria or parasites.  · X-ray, ultrasound, or CT scan. Imaging tests that take pictures of the digestive tract.  · Colonoscopy or sigmoidoscopy. This test uses a flexible tube with a tiny camera. The tube is inserted through your anus into your rectum to see the inside of your colon. Your provider can also take a tiny tissue sample (biopsy) and treat a bleeding source  Diagnosing the cause of blood in vomit  If you are vomiting blood or something that looks like coffee grounds, you may have an upper GI tract problem. To find the cause, tests that may be done include:  · Upper Endoscopy. A flexible tube with a tiny camera is inserted through your mouth and throat to see inside your upper GI tract. This lets your provider take a tiny tissue sample (biopsy) and treat a bleeding source.  · Nasogastric lavage. This can tell if you have upper GI or lower GI bleeding.  · X-ray, ultrasound, or CT scan. Imaging tests that take pictures of your digestive tract.  · Upper GI series. X-rays of the upper part of your GI tract taken from inside your body.  · Enteroscopy. This sends a flexible tube or a small, swallowed capsule camera into your small intestine.  When to call your healthcare provider  Call your healthcare provider right away if you have any of the following:  · Bleeding from your mouth or anus that can't be stopped  · Fever of 100.4°F (38.0°) or higher  · Bleeding along with feeling lightheaded or dizzy  · Signs of fluid loss (dehydration). These include a dry, sticky mouth, decreased urine output; and very dark urine.  · Belly (abdominal) pain     © 5578-9335 [employer]. [address] [place] [address], [place] [address]. All rights reserved. This information is not intended as a substitute for professional medical care. Always follow your healthcare professional's  instructions.        Lower GI Bleeding (Stable)  You have signs of blood in your stool. This is called rectal bleeding. The bleeding may have begun in another part of your gastrointestinal (GI) tract. If the blood is bright red, it is likely coming from the lower part of the GI tract. If the blood is black or dark, it might be coming from higher up in the GI tract. Very small amounts of GI bleeding may not be visible and can only be discovered during a test on your stool. Possible causes of lower GI bleeding include:  · Hemorrhoids  · Anal fissures  · Diverticulitis  · Inflammatory bowel disease (Crohn's disease or ulcerative colitis)  · Polyps (growths) in the intestine  Note: Iron supplements and medicines for diarrhea or upset stomach can cause black stools. Foods such as licorice and red beets can also discolor the stool and be mistaken for bleeding. These are not bleeding and are not a cause for alarm.  Home care  You have not lost a large amount of blood and your condition appears stable at this time. You may resume normal activity as long as you feel well.  Avoid NSAIDs, such as aspirin, ibuprofen, or naproxen. They can irritate the stomach and cause further bleeding. If you are taking these medicines for other medical reasons, talk to your healthcare provider before you stop them.   Follow-up care  Follow up with your healthcare provider as advised. Further tests may be needed to find the cause of your bleeding.  When to seek medical advice  Call your healthcare provider for any of the following:  · Large amount of rectal bleeding   · Increasing abdominal pain  · Weakness, dizziness  Call 911  Get emergency medical care if any of the following occur:  · Loss of consciousness  · Vomiting blood    © 9875-2657 [employer]. [address] [place] [address], [place] [address]. All rights reserved. This information is not intended as a substitute for professional medical care. Always follow your healthcare  professional's instructions.

## 2020-08-20 NOTE — PROGRESS NOTES
08/19/20 1915   Post-Hemodialysis Assessment   Rinseback Volume (mL) 250 mL   Blood Volume Processed (Liters) 52.2 L   Dialyzer Clearance Lightly streaked   Duration of Treatment (minutes) 180 minutes   Hemodialysis Intake (mL) 500 mL   Total UF (mL) 1500 mL   Net Fluid Removal 1000   Patient Response to Treatment tolerated well   Post-Treatment Weight 77.5 kg (170 lb 13.7 oz)   Treatment Weight Change -1   Arterial bleeding stop time (min) 6 min   Venous bleeding stop time (min) 6 min   Post-Hemodialysis Comments no problems with HD

## 2020-08-22 PROBLEM — R65.21 SEPTIC SHOCK: Status: ACTIVE | Noted: 2020-01-01

## 2020-08-22 PROBLEM — A41.9 SEPTIC SHOCK: Status: ACTIVE | Noted: 2020-01-01

## 2020-08-22 PROBLEM — R56.9 SEIZURE: Status: ACTIVE | Noted: 2020-01-01

## 2020-08-22 PROBLEM — Z71.89 GOALS OF CARE, COUNSELING/DISCUSSION: Status: ACTIVE | Noted: 2020-01-01

## 2020-08-22 NOTE — ASSESSMENT & PLAN NOTE
Noted, continue home Eliquis for anticoagulation in the setting of atrial fibrillation.  Bleeding precautions.

## 2020-08-22 NOTE — ASSESSMENT & PLAN NOTE
Chronic, controlled. Will continue home medication.    Lab Results   Component Value Date    CHOL 89 (L) 07/26/2018    CHOL 102 (L) 04/25/2018    CHOL 116 (L) 03/10/2017     Lab Results   Component Value Date    HDL 29 (L) 07/26/2018    HDL 31 (L) 04/25/2018    HDL 29 (L) 03/10/2017     Lab Results   Component Value Date    LDLCALC 46.4 (L) 07/26/2018    LDLCALC 47.6 (L) 04/25/2018    LDLCALC 62.8 (L) 03/10/2017     Lab Results   Component Value Date    TRIG 68 07/26/2018    TRIG 117 04/25/2018    TRIG 121 03/10/2017     Lab Results   Component Value Date    CHOLHDL 32.6 07/26/2018    CHOLHDL 30.4 04/25/2018    CHOLHDL 25.0 03/10/2017

## 2020-08-22 NOTE — ASSESSMENT & PLAN NOTE
Advance Care Planning     Living Will  During this visit, I engaged the healthcare power of   (daughter Miss Castaneda) in the advance care planning process.  The patient and I reviewed the role for advance directives and their purpose in directing future healthcare if the patient's unable to speak for him/herself.  At this point in time, the patient does have full decision-making capacity.  We discussed different extreme health states that he could experience, and reviewed what kind of medical care he would want in those situations.  The healthcare power of   (daughter Miss Castaneda)communicated that if he were comatose and had little chance of a meaningful recovery, he would not want machines/life-sustaining treatments used.  I spent a total of 20 minutes engaging the patient in this advance care planning discussion.

## 2020-08-22 NOTE — ASSESSMENT & PLAN NOTE
Chronic, controlled.  Will utilize DuoNebs as needed.  Patient denies respiratory complaints upon admission to the hospital.  Continuous telemetry and SpO2 monitoring.  Utilize supplemental oxygen to maintain SpO2 greater than 90%.

## 2020-08-22 NOTE — NURSING
Received patient from ER via stretcher. Report from [facility]. Confused, oriented to self and place. 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 Temp 99.2. Respirations unlabored with occasional nonproductive cough. Lungs clear, diminished to bases bilaterally. Abdomen soft, nontender. Peg in place, clamped. Afib, rate in the 70's.   Patient has expressive aphasia and is a poor historian. Talked to daughter, Tonya. She reports that patient does still void at times and wear a brief. Also, receives tube feedings from 8 pm to 8 am via pump at home. He does receive two meals by mouth - soft food.

## 2020-08-22 NOTE — NURSING
Dr. Olivas on rounds. Updated. Pt with  Hx of dysphagia/aspiration that gets pleasure feeds at home and TF via PEG at night per daughter, Tonya.  Bedside swallow completed by . (see note)  Dr. Olivas spoke with Tonya on the phone at length regarding condition and wishes. Code status changed to full DNR.

## 2020-08-22 NOTE — ASSESSMENT & PLAN NOTE
Creatine stable for now. BMP reviewed- noted Estimated Creatinine Clearance: 14.9 mL/min (A) (based on SCr of 4.4 mg/dL (H)). according to latest data. Monitor UOP and serial BMP and adjust therapy as needed. Renally dose meds.

## 2020-08-22 NOTE — PROGRESS NOTES
Pharmacist Renal Dose Adjustment Note    Micheal Hunt is a 79 y.o. male being treated with Famotidine    Patient Data:    Vital Signs (Most Recent):  Temp: 99.2 °F (37.3 °C) (08/22/20 0827)  Pulse: 72 (08/22/20 0827)  Resp: (!) 27 (08/22/20 0827)  BP: 115/69 (08/22/20 0827)  SpO2: 100 % (08/22/20 0715)   Vital Signs (72h Range):  Temp:  [97.6 °F (36.4 °C)-102.6 °F (39.2 °C)]   Pulse:  [63-95]   Resp:  [18-27]   BP: ()/(50-89)   SpO2:  [93 %-100 %]      Recent Labs   Lab 08/18/20  0353 08/19/20  0433 08/21/20  2325   CREATININE 4.8* 6.6* 4.4*     Serum creatinine: 4.4 mg/dL (H) 08/21/20 2325  Estimated creatinine clearance: 14.9 mL/min (A)    Famotidine 20 mg IV Q12H will be changed to Famotidine 20 mg IV Q24H    Pharmacist's Name: Susi Guido  Pharmacist's Extension: [phone]

## 2020-08-22 NOTE — ED NOTES
Attempted cath urine specimen with no urine return and no resistance with insertion. Pt tolerated well.

## 2020-08-22 NOTE — SUBJECTIVE & OBJECTIVE
Past Medical History:   Diagnosis Date    NICK (acute kidney injury) 7/29/2016    Allergy     Anemia, mild 12/15/2014    Arthritis     Gout    Atrial fibrillation 03/2019    Benign essential HTN 3/27/2012    BMI 29.0-29.9,adult 5/10/2018    BPH (benign prostatic hyperplasia)     BPH (benign prostatic hyperplasia)     CAD (coronary artery disease) 2006    Carotid artery occlusion     60-70% FROYLAN, LICA < 50%    Chronic kidney disease     due to ibuprofen    Colon polyp     CRF (chronic renal failure), stage 5     Diabetes mellitus     Diverticulosis     ESRD (end stage renal disease) on dialysis     Gastritis     GERD (gastroesophageal reflux disease)     Gout     History of colon polyps 5/3/2018    HTN (hypertension) 3/27/2012    Hyperlipidemia     Hyperlipidemia     LLL pneumonia 6/14/2018    LVH (left ventricular hypertrophy)     Mesenteric ischemia     Murmur, cardiac 3/27/2012    GRICELDA (obstructive sleep apnea)     DOES NOT USE A MACHINE    Sinus problem     Stroke 03/2019    acute left PCA    Syncope and collapse        Past Surgical History:   Procedure Laterality Date    APPENDECTOMY      CARDIAC CATHETERIZATION  2012    LIMA to LAD patent, SVG to RCA    COLONOSCOPY  2011    COLONOSCOPY N/A 5/3/2018    Procedure: COLONOSCOPY;  Surgeon: Messi Harris MD;  Location: [facility] ENDO;  Service: Endoscopy;  Laterality: N/A;    COLONOSCOPY N/A 8/18/2020    Procedure: COLONOSCOPY;  Surgeon: Messi Harris MD;  Location: [facility] ENDO;  Service: Endoscopy;  Laterality: N/A;    CORONARY ARTERY BYPASS GRAFT  4/2007    x 2 [place]    ESOPHAGOGASTRODUODENOSCOPY N/A 8/17/2020    Procedure: EGD (ESOPHAGOGASTRODUODENOSCOPY);  Surgeon: Eben Soto MD;  Location: [facility] ENDO;  Service: Endoscopy;  Laterality: N/A;    JOINT REPLACEMENT      left knee total replacement  X 3    mid leftt finger      from a cactuss    MOLE REMOVAL  2016    rotative cuff      no rotative cuffs on bilat  shoulders has pins     SHOULDER SURGERY      shoulder surgery bilat  RIGHT X 4; LEFT X 3       Review of patient's allergies indicates:   Allergen Reactions    Ace inhibitors Other (See Comments)     Cough    Angiotensin ii     Arb-angiotensin receptor antagonist Itching    Eplerenone Other (See Comments)     Marked bradycardia, 40, tiredness and weakness      Sulfa (sulfonamide antibiotics) Itching and Swelling     Patient says this was 10 years ago and doesn't remember what happened       No current facility-administered medications on file prior to encounter.      Current Outpatient Medications on File Prior to Encounter   Medication Sig    albuterol (PROVENTIL/VENTOLIN HFA) 90 mcg/actuation inhaler 2 puffs every 4 hours as needed for cough, wheeze, or shortness of breath (Patient not taking: Reported on 8/20/2020)    allopurinoL (ZYLOPRIM) 100 MG tablet 100 mg by FEEDING TUBE route once daily.    apixaban (ELIQUIS) 2.5 mg Tab 2.5 mg by FEEDING TUBE route 2 (two) times daily.    aspirin (ECOTRIN) 81 MG EC tablet 81 mg by FEEDING TUBE route once daily.    atorvastatin (LIPITOR) 40 MG tablet 1 tablet (40 mg total) by Per G Tube route once daily.    B complex-vitamin C-folic acid 800 mcg Chew 0.8 mg by Per G Tube route once daily.    carvediloL (COREG) 12.5 MG tablet Take 6.5 tablets (81.25 mg total) by mouth 2 (two) times daily. (Patient taking differently: 78.125 mg by Per G Tube route 2 (two) times daily. )    finasteride (PROSCAR) 5 mg tablet Take 1 tablet (5 mg total) by mouth once daily. (Patient taking differently: 5 mg by Per G Tube route once daily. )    fluticasone (FLONASE) 50 mcg/actuation nasal spray 2 sprays (100 mcg total) by Each Nare route once daily.    levETIRAcetam (KEPPRA) 100 mg/mL Soln 7.5 ml by PEG daily    melatonin 10 mg Tab Take by mouth nightly.    mupirocin (BACTROBAN) 2 % ointment Apply topically 3 (three) times daily.    nut.tx.imp.renal fxn,lac-reduc (NEPRO CARB  STEADY ORAL) 240 mLs by FEEDING TUBE route 2 (two) times daily.    nut.tx.imp.renal fxn,lac-reduc (NEPRO CARB STEADY ORAL) 1,000 mLs by FEEDING TUBE route 2 (two) times daily.    pantoprazole (PROTONIX) 40 MG tablet Take 1 tablet (40 mg total) by mouth once daily. (Patient not taking: Reported on 6/30/2020)    polyethylene glycol (GLYCOLAX) 17 gram PwPk Take 17 g by mouth.    pramoxine-hydrocortisone cream Apply topically 3 (three) times daily.    senna-docusate 8.6-50 mg (PERICOLACE) 8.6-50 mg per tablet Take 1 tablet by mouth.    tamsulosin (FLOMAX) 0.4 mg Cap Take 1 capsule (0.4 mg total) by mouth once daily.    tiotropium bromide (SPIRIVA RESPIMAT) 1.25 mcg/actuation Mist Inhale 2.5 mcg into the lungs once daily. Controller    vit b cmplx 3-fa-vit c-biotin 1- mg-mg-mcg (NEPHRO-EMMETT RX) 1- mg-mg-mcg Tab Take 1 tablet by mouth once daily.     Family History     Problem Relation (Age of Onset)    Cancer Father    Heart disease Mother, Sister    Hypertension Brother    Pneumonia Sister    Stroke Sister    Sudden death Father        Tobacco Use    Smoking status: Never Smoker    Smokeless tobacco: Never Used   Substance and Sexual Activity    Alcohol use: No     Frequency: Never     Drinks per session: Patient refused     Binge frequency: Patient refused    Drug use: No    Sexual activity: Not on file     Review of Systems   Unable to perform ROS: Acuity of condition (Patient with baseline confusion - unable to provide ROS)     Objective:     Vital Signs (Most Recent):  Temp: 97.6 °F (36.4 °C) (08/22/20 0026)  Pulse: 77 (08/22/20 0153)  Resp: 18 (08/21/20 2218)  BP: (!) 86/53 (08/22/20 0146)  SpO2: 96 % (08/22/20 0153) Vital Signs (24h Range):  Temp:  [97.6 °F (36.4 °C)-102.6 °F (39.2 °C)] 97.6 °F (36.4 °C)  Pulse:  [75-95] 77  Resp:  [18] 18  SpO2:  [93 %-98 %] 96 %  BP: ()/(51-74) 86/53     Weight: 78.5 kg (173 lb 1 oz)  Body mass index is 22.83 kg/m².    Physical Exam  Vitals signs  and nursing note reviewed.   Constitutional:       General: He is not in acute distress.     Appearance: He is well-developed. He is not diaphoretic.   HENT:      Head: Normocephalic and atraumatic.   Eyes:      General:         Right eye: No discharge.         Left eye: No discharge.      Pupils: Pupils are equal, round, and reactive to light.      Comments: Eyeglasses in use   Neck:      Musculoskeletal: Normal range of motion.      Vascular: No JVD.   Cardiovascular:      Rate and Rhythm: Normal rate. Rhythm irregular.      Heart sounds: Murmur present.      Comments: Irregular, regular.  Consistent with atrial fibrillation.  Murmur present.  Pulmonary:      Effort: Pulmonary effort is normal. No respiratory distress.      Breath sounds: Normal breath sounds. No wheezing or rales.      Comments: Patient on room air during my initial interview and physical exam, patient in no acute respiratory distress.  Chest:      Chest wall: No tenderness.   Abdominal:      General: Bowel sounds are normal. There is no distension.      Palpations: Abdomen is soft.      Tenderness: There is no abdominal tenderness. There is no guarding or rebound.      Comments: Peg tube in place.   Genitourinary:     Comments: Exam Deferred     Musculoskeletal: Normal range of motion.         General: No tenderness or deformity.   Skin:     General: Skin is warm and dry.      Capillary Refill: Capillary refill takes 2 to 3 seconds.      Findings: Bruising present. No erythema or rash.      Comments: Left upper extremity dialysis graft present, not accessed.  Positive thrill positive bruit.   Neurological:      Mental Status: He is alert.      Comments: Patient is awake, alert.  Patient oriented to self.  Patient disoriented to location, situation, and time.  Patient follows commands.  No evidence of facial droop.  Spontaneous movement noted to all 4 extremities.   Psychiatric:      Comments: Psychiatric assessment limited as patient has altered  mental status, reportedly at baseline.           CRANIAL NERVES     CN III, IV, VI   Pupils are equal, round, and reactive to light.       Significant Labs:   CBC:   Recent Labs   Lab 08/21/20  2326   WBC 13.65*   HGB 9.6*   HCT 28.5*        CMP:   Recent Labs   Lab 08/21/20  2325      K 4.7      CO2 25   GLU 87   BUN 18   CREATININE 4.4*   CALCIUM 8.9   PROT 7.1   ALBUMIN 3.5   BILITOT 0.4   ALKPHOS 92   AST 9*   ALT 9*   ANIONGAP 9   EGFRNONAA 12*     Lactic acid- 2.3 >>2.1  Procalcitonin -1.59  COVID-negative  Influenza-negative    Significant Imaging: I have reviewed all pertinent imaging results/findings within the past 24 hours.     Chest x-ray:  Impression:       No evidence of acute chest disease since the prior exam.     EKG performed on 08/22/2020, impression as below:  Atrial fibrillation  Left axis deviation  LVH with QRS widening  Nonspecific T wave abnormality  Abnormal ECG  When compared with ECG of 16-AUG-2020 12:18,  T wave inversion more evident in Anterior-lateral leads

## 2020-08-22 NOTE — ASSESSMENT & PLAN NOTE
This patient does have evidence of infective focus  My overall impression is septic shock.  Antibiotics given-   IV Vancomycin and IV Zosyn       Latest lactate reviewed, they are-  Recent Labs   Lab 08/21/20  2325 08/22/20  0147   LACTATE 2.3* 2.1       Organ dysfunction indicated by Encephalopathy and Acute respiratory failure  Source- Unknown - Chest xray WNL, Urine pending, Blood cultures obtained and pending.  Source control Achieved by- IV Vancomycin and IV Zosyn    Patient given 1L NS in ER. Admit to ICU for further monitoring.

## 2020-08-22 NOTE — CONSULTS
" INPATIENT NEPHROLOGY CONSULT   [facility] NEPHROLOGY    Micheal Hunt  08/22/2020    Reason for consultation:    esrd    Chief Complaint:   Chief Complaint   Patient presents with    Fever     103 at home today; dialysis today          History of Present Illness:    As per H and P    Micheal Hunt is an 79-year-old male with past medical history of end-stage renal disease on HD, hypertension, atrial fibrillation on home Eliquis, GERD, BPH, and previous CVA who presents to the emergency room tonight with reports of fever.  Information for HPI obtained from ER physician note as patient is a poor historian.  Patient with obvious disorientation and confusion upon my initial interview and physical exam, per ER physician this is reported as patient's baseline.  Per ER physician note, Micheal Hunt is a 79 y.o. male with PMHx of CAD, HTN, HLD, A-fib, and ESRD who presents to the ED via EMS for evaluation of fever and "feeling generally unwell". Patient had dialysis this morning. This afternoon, it was noted by family that patient had a fever of 103 at home. He denies chest pain, SOB, abdominal pain, N/V/D, recent cough, congestion, or other new symptoms. Patient has a history of stroke and chronic right sided weakness secondary to the stroke. He is still able to produce urine, denies pain with urination. Patient has no other medical concerns or complaints at this moment. PSHx includes CABG and EGD."  Patient not accompanied by any visitors during my initial interview and physical exam.  In the emergency room patient noted to meet septic shock criteria.  Patient was given 1 L normal saline in the emergency room and initiated on IV vancomycin and Zosyn.  Chest x-ray not revealing any acute intrathoracic process.  Patient urine unable to be obtained, patient is end-stage renal disease patient.  Patient admitted to ICU on 08/22/2020 approximately 2:30 a.m..    8/22 No nausea, chest pain, sob, fever, " urinary or bowel complaint, new neurologic symptoms, new joint pain,      Plan of Care:       Assessment:    esrd  --continue dialysis per routine  --fluid restrict  --renal dose medication per routine  --continue outpt medication  --continue binders with meals    Anemia  --erythropoiesis stimulating agent with renal replacement therapy    Fever  --empiric abx  --cxr neg  --f/u cultures    A-fib-rate controlled    H/o COPD  --compensated       Thank you for allowing us to participate in this patient's care. We will continue to follow.    Vital Signs:  Temp Readings from Last 3 Encounters:   08/22/20 99.2 °F (37.3 °C) (Oral)   08/19/20 96.5 °F (35.8 °C) (Oral)   08/03/20 98.2 °F (36.8 °C) (Skin)       Pulse Readings from Last 3 Encounters:   08/22/20 69   08/19/20 78   08/03/20 62       BP Readings from Last 3 Encounters:   08/22/20 110/62   08/19/20 120/70   08/03/20 (!) 150/66       Weight:  Wt Readings from Last 3 Encounters:   08/22/20 78.5 kg (173 lb 1 oz)   08/18/20 78.5 kg (173 lb)   08/03/20 81.8 kg (180 lb 5.4 oz)       Past Medical & Surgical History:  Past Medical History:   Diagnosis Date    NICK (acute kidney injury) 7/29/2016    Allergy     Anemia, mild 12/15/2014    Arthritis     Gout    Atrial fibrillation 03/2019    Benign essential HTN 3/27/2012    BMI 29.0-29.9,adult 5/10/2018    BPH (benign prostatic hyperplasia)     BPH (benign prostatic hyperplasia)     CAD (coronary artery disease) 2006    Carotid artery occlusion     60-70% FROYLAN, LICA < 50%    Chronic kidney disease     due to ibuprofen    Colon polyp     CRF (chronic renal failure), stage 5     Diabetes mellitus     Diverticulosis     ESRD (end stage renal disease) on dialysis     Gastritis     GERD (gastroesophageal reflux disease)     Gout     History of colon polyps 5/3/2018    HTN (hypertension) 3/27/2012    Hyperlipidemia     Hyperlipidemia     LLL pneumonia 6/14/2018    LVH (left ventricular hypertrophy)      Mesenteric ischemia     Murmur, cardiac 3/27/2012    GRICELDA (obstructive sleep apnea)     DOES NOT USE A MACHINE    Sinus problem     Stroke 03/2019    acute left PCA    Syncope and collapse        Past Surgical History:   Procedure Laterality Date    APPENDECTOMY      CARDIAC CATHETERIZATION  2012    LIMA to LAD patent, SVG to RCA    COLONOSCOPY  2011    COLONOSCOPY N/A 5/3/2018    Procedure: COLONOSCOPY;  Surgeon: Messi Harris MD;  Location: [facility] ENDO;  Service: Endoscopy;  Laterality: N/A;    COLONOSCOPY N/A 8/18/2020    Procedure: COLONOSCOPY;  Surgeon: Messi Harris MD;  Location: [facility] ENDO;  Service: Endoscopy;  Laterality: N/A;    CORONARY ARTERY BYPASS GRAFT  4/2007    x 2 [place]    ESOPHAGOGASTRODUODENOSCOPY N/A 8/17/2020    Procedure: EGD (ESOPHAGOGASTRODUODENOSCOPY);  Surgeon: Eben Soto MD;  Location: [facility] ENDO;  Service: Endoscopy;  Laterality: N/A;    JOINT REPLACEMENT      left knee total replacement  X 3    mid leftt finger      from a cactuss    MOLE REMOVAL  2016    rotative cuff      no rotative cuffs on bilat shoulders has pins     SHOULDER SURGERY      shoulder surgery bilat  RIGHT X 4; LEFT X 3       Past Social History:  Social History     Socioeconomic History    Marital status:      Spouse name: Not on file    Number of children: Not on file    Years of education: Not on file    Highest education level: Not on file   Occupational History    Not on file   Social Needs    Financial resource strain: Somewhat hard    Food insecurity     Worry: Often true     Inability: Often true    Transportation needs     Medical: Yes     Non-medical: Yes   Tobacco Use    Smoking status: Never Smoker    Smokeless tobacco: Never Used   Substance and Sexual Activity    Alcohol use: No     Frequency: Never     Drinks per session: Patient refused     Binge frequency: Patient refused    Drug use: No    Sexual activity: Not on file   Lifestyle    Physical  activity     Days per week: Not on file     Minutes per session: Not on file    Stress: Not on file   Relationships    Social connections     Talks on phone: More than three times a week     Gets together: Three times a week     Attends Yarsanism service: Not on file     Active member of club or organization: Patient refused     Attends meetings of clubs or organizations: Patient refused     Relationship status:    Other Topics Concern    Not on file   Social History Narrative    [place]; daughter nearby       Medications:  No current facility-administered medications on file prior to encounter.      Current Outpatient Medications on File Prior to Encounter   Medication Sig Dispense Refill    albuterol (PROVENTIL/VENTOLIN HFA) 90 mcg/actuation inhaler 2 puffs every 4 hours as needed for cough, wheeze, or shortness of breath (Patient not taking: Reported on 8/20/2020)      allopurinoL (ZYLOPRIM) 100 MG tablet 100 mg by FEEDING TUBE route once daily.      apixaban (ELIQUIS) 2.5 mg Tab 2.5 mg by FEEDING TUBE route 2 (two) times daily.      aspirin (ECOTRIN) 81 MG EC tablet 81 mg by FEEDING TUBE route once daily.      atorvastatin (LIPITOR) 40 MG tablet 1 tablet (40 mg total) by Per G Tube route once daily. 90 tablet 4    B complex-vitamin C-folic acid 800 mcg Chew 0.8 mg by Per G Tube route once daily.      carvediloL (COREG) 12.5 MG tablet Take 6.5 tablets (81.25 mg total) by mouth 2 (two) times daily. (Patient taking differently: 78.125 mg by Per G Tube route 2 (two) times daily. ) 90 tablet 4    finasteride (PROSCAR) 5 mg tablet Take 1 tablet (5 mg total) by mouth once daily. (Patient taking differently: 5 mg by Per G Tube route once daily. ) 90 tablet 3    fluticasone (FLONASE) 50 mcg/actuation nasal spray 2 sprays (100 mcg total) by Each Nare route once daily. 3 Bottle 3    levETIRAcetam (KEPPRA) 100 mg/mL Soln 7.5 ml by PEG daily 120 mL 5    melatonin 10 mg Tab Take by mouth  nightly.      mupirocin (BACTROBAN) 2 % ointment Apply topically 3 (three) times daily. 1 Tube 2    nut.tx.imp.renal fxn,lac-reduc (NEPRO CARB STEADY ORAL) 240 mLs by FEEDING TUBE route 2 (two) times daily.      nut.tx.imp.renal fxn,lac-reduc (NEPRO CARB STEADY ORAL) 1,000 mLs by FEEDING TUBE route 2 (two) times daily.      pantoprazole (PROTONIX) 40 MG tablet Take 1 tablet (40 mg total) by mouth once daily. (Patient not taking: Reported on 6/30/2020) 30 tablet 11    polyethylene glycol (GLYCOLAX) 17 gram PwPk Take 17 g by mouth.      pramoxine-hydrocortisone cream Apply topically 3 (three) times daily. 57 g 3    senna-docusate 8.6-50 mg (PERICOLACE) 8.6-50 mg per tablet Take 1 tablet by mouth.      tamsulosin (FLOMAX) 0.4 mg Cap Take 1 capsule (0.4 mg total) by mouth once daily. 90 capsule 2    tiotropium bromide (SPIRIVA RESPIMAT) 1.25 mcg/actuation Mist Inhale 2.5 mcg into the lungs once daily. Controller 4 g 5    vit b cmplx 3-fa-vit c-biotin 1- mg-mg-mcg (NEPHRO-EMMETT RX) 1- mg-mg-mcg Tab Take 1 tablet by mouth once daily. 90 tablet 3     Scheduled Meds:   allopurinoL  100 mg Oral Daily    apixaban  2.5 mg Oral BID    aspirin  81 mg Oral Daily    atorvastatin  40 mg Per G Tube Daily    famotidine (PF)  20 mg Intravenous Daily    finasteride  5 mg Per G Tube Daily    fluticasone propionate  2 spray Each Nostril Daily    levetiracetam oral soln  7.5 mL Per G Tube Daily    piperacillin-tazobactam (ZOSYN) IVPB  4.5 g Intravenous Q12H    tamsulosin  1 capsule Oral Daily    vitamin renal formula (B-complex-vitamin c-folic acid)  1 capsule Oral Daily     Continuous Infusions:  PRN Meds:.acetaminophen, albuterol-ipratropium, ondansetron, sodium chloride 0.9%, Pharmacy to dose Vancomycin consult **AND** vancomycin - pharmacy to dose    Allergies:  Ace inhibitors, Angiotensin ii, Arb-angiotensin receptor antagonist, Eplerenone, and Sulfa (sulfonamide antibiotics)    Past Family  "History:  Reviewed; refer to Hospitalist Admission Note    Review of Systems:  Review of Systems - All 14 systems reviewed and negative, except as noted in HPI    Physical Exam:    /62   Pulse 69   Temp 99.2 °F (37.3 °C) (Oral)   Resp (!) 24   Ht 6' 1" (1.854 m)   Wt 78.5 kg (173 lb 1 oz) Comment: per record  SpO2 100%   BMI 22.83 kg/m²     General Appearance:    Alert, cooperative, no distress, appears stated age   Head:    Normocephalic, without obvious abnormality, atraumatic   Eyes:    PER, conjunctiva/corneas clear, EOM's intact in both eyes        Throat:   Lips, mucosa, and tongue normal; teeth and gums normal   Back:     Symmetric, no curvature, ROM normal, no CVA tenderness   Lungs:     Clear to auscultation bilaterally, respirations unlabored   Chest wall:    No tenderness or deformity   Heart:    Regular rate and rhythm, S1 and S2 normal, no murmur, rub   or gallop   Abdomen:     Soft, non-tender, bowel sounds active all four quadrants,     no masses, no organomegaly   Extremities:   Extremities normal, atraumatic, no cyanosis or edema   Pulses:   2+ and symmetric all extremities   MSK:   No joint or muscle swelling, tenderness or deformity   Skin:   Skin color, texture, turgor normal, no rashes or lesions   Neurologic:   .  No flap     Results:  Lab Results   Component Value Date     08/21/2020    K 4.7 08/21/2020     08/21/2020    CO2 25 08/21/2020    BUN 18 08/21/2020    CREATININE 5.1 (H) 08/22/2020    CALCIUM 8.9 08/21/2020    ANIONGAP 9 08/21/2020    ESTGFRAFRICA 11 (A) 08/22/2020    EGFRNONAA 10 (A) 08/22/2020       Lab Results   Component Value Date    CALCIUM 8.9 08/21/2020    PHOS 4.0 08/19/2020       Recent Labs   Lab 08/21/20  2326   WBC 13.65*   RBC 2.81*   HGB 9.6*   HCT 28.5*      *   MCH 34.2*   MCHC 33.7       I have personally reviewed pertinent radiological imaging and reports.       "

## 2020-08-22 NOTE — ASSESSMENT & PLAN NOTE
Noted, with residual right-sided weakness.  Fall precautions, aspiration precautions, neuro checks. Patient has a PEG tube present, unable to determine home PO diet regimen, will order swallow evaluation for further recommendations.

## 2020-08-22 NOTE — ASSESSMENT & PLAN NOTE
Chronic, controlled. Continue home Eliquis. Continuous telemetry monitoring. EKG obtained in ER and reviewed.

## 2020-08-22 NOTE — ED PROVIDER NOTES
"Encounter Date: 8/21/2020    SCRIBE #1 NOTE: I, Edna Dempsey, am scribing for, and in the presence of, Bill Cerna MD.       History     Chief Complaint   Patient presents with    Fever     103 at home today; dialysis today       Time seen by provider: 11:01 PM on 08/21/2020    Micheal Hunt is a 79 y.o. male with PMHx of CAD, HTN, HLD, A-fib, and ESRD who presents to the ED via EMS for evaluation of fever and "feeling generally unwell". Patient had dialysis this morning. This afternoon, it was noted by family that patient had a fever of 103 at home. He denies chest pain, SOB, abdominal pain, N/V/D, recent cough, congestion, or other new symptoms. Patient has a history of stroke and chronic right sided weakness secondary to the stroke. He is still able to produce urine, denies pain with urination. Patient has no other medical concerns or complaints at this moment. PSHx includes CABG and EGD.     The history is provided by the patient.     Review of patient's allergies indicates:   Allergen Reactions    Ace inhibitors Other (See Comments)     Cough    Angiotensin ii     Arb-angiotensin receptor antagonist Itching    Eplerenone Other (See Comments)     Marked bradycardia, 40, tiredness and weakness      Sulfa (sulfonamide antibiotics) Itching and Swelling     Patient says this was 10 years ago and doesn't remember what happened     Past Medical History:   Diagnosis Date    NICK (acute kidney injury) 7/29/2016    Allergy     Anemia, mild 12/15/2014    Arthritis     Gout    Atrial fibrillation 03/2019    Benign essential HTN 3/27/2012    BMI 29.0-29.9,adult 5/10/2018    BPH (benign prostatic hyperplasia)     BPH (benign prostatic hyperplasia)     CAD (coronary artery disease) 2006    Carotid artery occlusion     60-70% FROYLAN, LICA < 50%    Chronic kidney disease     due to ibuprofen    Colon polyp     CRF (chronic renal failure), stage 5     Diabetes mellitus     Diverticulosis     " ESRD (end stage renal disease) on dialysis     Gastritis     GERD (gastroesophageal reflux disease)     Gout     History of colon polyps 5/3/2018    HTN (hypertension) 3/27/2012    Hyperlipidemia     Hyperlipidemia     LLL pneumonia 2018    LVH (left ventricular hypertrophy)     Mesenteric ischemia     Murmur, cardiac 3/27/2012    GRICELDA (obstructive sleep apnea)     DOES NOT USE A MACHINE    Sinus problem     Stroke 2019    acute left PCA    Syncope and collapse      Past Surgical History:   Procedure Laterality Date    APPENDECTOMY      CARDIAC CATHETERIZATION      LIMA to LAD patent, SVG to RCA    COLONOSCOPY      COLONOSCOPY N/A 5/3/2018    Procedure: COLONOSCOPY;  Surgeon: Messi Harris MD;  Location: [facility] ENDO;  Service: Endoscopy;  Laterality: N/A;    COLONOSCOPY N/A 2020    Procedure: COLONOSCOPY;  Surgeon: Messi Harris MD;  Location: [facility] ENDO;  Service: Endoscopy;  Laterality: N/A;    CORONARY ARTERY BYPASS GRAFT  4/2007    x 2 [place]    ESOPHAGOGASTRODUODENOSCOPY N/A 2020    Procedure: EGD (ESOPHAGOGASTRODUODENOSCOPY);  Surgeon: Eben Soto MD;  Location: [facility] ENDO;  Service: Endoscopy;  Laterality: N/A;    JOINT REPLACEMENT      left knee total replacement  X 3    mid leftt finger      from a cactuss    MOLE REMOVAL      rotative cuff      no rotative cuffs on bilat shoulders has pins     SHOULDER SURGERY      shoulder surgery bilat  RIGHT X 4; LEFT X 3     Family History   Problem Relation Age of Onset    Heart disease Mother     Sudden death Father     Cancer Father         advanced lung ca- found after 2 story fall    Stroke Sister     Pneumonia Sister          from PNA    Heart disease Sister     Hypertension Brother     Kidney cancer Neg Hx     Prostate cancer Neg Hx     Urolithiasis Neg Hx     Allergic rhinitis Neg Hx     Allergies Neg Hx     Angioedema Neg Hx     Asthma Neg Hx     Atopy Neg Hx     Eczema  Neg Hx     Immunodeficiency Neg Hx     Rhinitis Neg Hx     Urticaria Neg Hx      Social History     Tobacco Use    Smoking status: Never Smoker    Smokeless tobacco: Never Used   Substance Use Topics    Alcohol use: No     Frequency: Never     Drinks per session: Patient refused     Binge frequency: Patient refused    Drug use: No     Review of Systems   Constitutional: Positive for fever. Negative for activity change and chills.   HENT: Negative for congestion.    Respiratory: Negative for cough and shortness of breath.    Cardiovascular: Negative for chest pain.   Gastrointestinal: Negative for abdominal pain, diarrhea, nausea and vomiting.   Genitourinary: Negative for dysuria.   Musculoskeletal: Negative for joint swelling.   Skin: Negative for pallor and rash.   Neurological: Negative for headaches.   Hematological: Does not bruise/bleed easily.   Psychiatric/Behavioral: The patient is not nervous/anxious.        Physical Exam     Initial Vitals [08/21/20 2218]   BP Pulse Resp Temp SpO2   108/73 94 18 (!) 102.6 °F (39.2 °C) 98 %      MAP       --         Physical Exam    Nursing note and vitals reviewed.  Constitutional: He appears well-developed and well-nourished. He is not diaphoretic. No distress.   HENT:   Head: Normocephalic and atraumatic.   Eyes: EOM are normal. Pupils are equal, round, and reactive to light.   Neck: Normal range of motion. Neck supple.   Cardiovascular: Normal rate, regular rhythm, normal heart sounds and intact distal pulses. Exam reveals no gallop and no friction rub.    No murmur heard.  Pulmonary/Chest: Breath sounds normal. No respiratory distress. He has no wheezes. He has no rhonchi. He has no rales.   Abdominal: Soft. Bowel sounds are normal. There is no abdominal tenderness. There is no rebound and no guarding.   PEG tube in place.    Musculoskeletal: Normal range of motion.      Comments: Dialysis shunt in left upper arm with good thrill and bruit.    Neurological: He  is alert and oriented to person, place, and time.   Right arm flaccid. Chronic right leg weakness.   Skin: Skin is warm.   Psychiatric: He has a normal mood and affect. His behavior is normal. Judgment and thought content normal.         ED Course   Critical Care  Performed by: Bill Cerna MD  Authorized by: Bill Cerna MD   Direct patient critical care time: 17 minutes  Additional history critical care time: 8 minutes  Ordering / reviewing critical care time: 11 minutes  Documentation critical care time: 10 minutes  Total critical care time (exclusive of procedural time) : 46 minutes  Critical care was time spent personally by me on the following activities: development of treatment plan with patient or surrogate, examination of patient, ordering and performing treatments and interventions, ordering and review of radiographic studies, re-evaluation of patient's condition, ordering and review of laboratory studies, obtaining history from patient or surrogate and evaluation of patient's response to treatment.        Labs Reviewed   CBC W/ AUTO DIFFERENTIAL - Abnormal; Notable for the following components:       Result Value    WBC 13.65 (*)     RBC 2.81 (*)     Hemoglobin 9.6 (*)     Hematocrit 28.5 (*)     Mean Corpuscular Volume 101 (*)     Mean Corpuscular Hemoglobin 34.2 (*)     Gran # (ANC) 12.3 (*)     Immature Grans (Abs) 0.06 (*)     Lymph # 0.6 (*)     Gran% 90.3 (*)     Lymph% 4.2 (*)     Mono% 3.9 (*)     All other components within normal limits   COMPREHENSIVE METABOLIC PANEL - Abnormal; Notable for the following components:    Creatinine 4.4 (*)     AST 9 (*)     ALT 9 (*)     eGFR if  14 (*)     eGFR if non  12 (*)     All other components within normal limits   LACTIC ACID, PLASMA - Abnormal; Notable for the following components:    Lactate (Lactic Acid) 2.3 (*)     All other components within normal limits   PROCALCITONIN - Abnormal; Notable for the  following components:    Procalcitonin 1.59 (*)     All other components within normal limits   INFLUENZA A & B BY MOLECULAR   CULTURE, BLOOD   CULTURE, BLOOD   MAGNESIUM   SARS-COV-2 RNA AMPLIFICATION, QUAL   LACTIC ACID, PLASMA   URINALYSIS, REFLEX TO URINE CULTURE          Imaging Results          X-Ray Chest AP Portable (Final result)  Result time 08/21/20 23:35:25    Final result by Salty Knox MD (08/21/20 23:35:25)                 Impression:      No evidence of acute chest disease since the prior exam.      Electronically signed by: Salty Knox  Date:    08/21/2020  Time:    23:35             Narrative:    EXAMINATION:  XR CHEST AP PORTABLE    CLINICAL HISTORY:  Sepsis;    TECHNIQUE:  Single frontal view of the chest was performed.    COMPARISON:  08/16/2020    FINDINGS:  Cardiac silhouette enlargement with changes of previous median sternotomy again are noted.  The aorta is uncoiled and atherosclerotic.  The lungs and pleural spaces remain clear.                                 Medical Decision Making:   History:   Old Medical Records: I decided to obtain old medical records.  Initial Assessment:   79-year-old male presents with a fever.  Differential Diagnosis:   Initial differential diagnosis included but not limited to sepsis, UTI, and pneumonia.  Clinical Tests:   Lab Tests: Reviewed and Ordered  Radiological Study: Reviewed and Ordered  ED Management:  The patient was emergently evaluated in the emergency department, his evaluation was significant for an elderly male with a benign abdominal exam.  The patient's labs were significant for an elevated lactic acid level, elevated WBC, as well as an elevated procalcitonin level.  The patient's chest x-ray showed no acute abnormalities per Radiology.  The patient's diagnosis at this time is severe sepsis.  The patient was treated with IV fluids, IV Zosyn, and IV vancomycin.  The patient did have improvement in his lactic acid after treatment.  I  will admit the patient to the hospitalist service for further care.  The case was discussed with APC on call for the hospitalist service.  She has accepted the patient for admission.            Scribe Attestation:   Scribe #1: I performed the above scribed service and the documentation accurately describes the services I performed. I attest to the accuracy of the note.    I, Dr. Bill Cerna, personally performed the services described in this documentation. All medical record entries made by the scribe were at my direction and in my presence.  I have reviewed the chart and agree that the record reflects my personal performance and is accurate and complete. Bill Cerna MD.  3:55 AM 08/22/2020          Clinical Impression:       ICD-10-CM ICD-9-CM   1. Septic shock  A41.9 038.9    R65.21 785.52     995.92             ED Disposition Condition    Admit                           Bill Cerna MD  08/22/20 0358

## 2020-08-22 NOTE — PLAN OF CARE
Patient alert and oriented to person and place. Afebrile this afternoon. Nonproductive cough throughout day. Denies any SOB. BP stable. Peg tube flushes w/o difficulty. No residual noted. Tube feeding started via peg at 20 cc/hr (goal of 50 cc/hr). No urine this shift. Brief on. Dr. Rojo consulted, plans for dialysis on Monday. L upper arm fistula with positive thrill/bruit. Family sitter at bedside most of day. Safety measures in place.

## 2020-08-22 NOTE — CONSULTS
Pharmacokinetic Initial Assessment: IV Vancomycin    Assessment/Plan:    Initiate intravenous vancomycin with loading dose of 1250 mg once with subsequent doses when random concentrations are less than 25 mcg/mL  Desired empiric serum trough concentration is 15 to 20 mcg/mL  Draw vancomycin random level on 08/22 at 2330.  Pharmacy will continue to follow and monitor vancomycin.      Please contact pharmacy at extension [phone] with any questions regarding this assessment.     Thank you for the consult,   Susi Guido       Patient brief summary:  Micheal Hunt is a 79 y.o. male initiated on antimicrobial therapy with IV Vancomycin for treatment of suspected bacteremia    Drug Allergies:   Review of patient's allergies indicates:   Allergen Reactions    Ace inhibitors Other (See Comments)     Cough    Angiotensin ii     Arb-angiotensin receptor antagonist Itching    Eplerenone Other (See Comments)     Marked bradycardia, 40, tiredness and weakness      Sulfa (sulfonamide antibiotics) Itching and Swelling     Patient says this was 10 years ago and doesn't remember what happened       Actual Body Weight:   78.5 kg    Renal Function:   Estimated Creatinine Clearance: 14.9 mL/min (A) (based on SCr of 4.4 mg/dL (H)).,     Dialysis Method (if applicable):  No dialysis scheduled    CBC (last 72 hours):  Recent Labs   Lab Result Units 08/21/20  2326   WBC K/uL 13.65*   Hemoglobin g/dL 9.6*   Hematocrit % 28.5*   Platelets K/uL 172   Gran% % 90.3*   Lymph% % 4.2*   Mono% % 3.9*   Eosinophil% % 1.0   Basophil% % 0.2   Differential Method  Automated       Metabolic Panel (last 72 hours):  Recent Labs   Lab Result Units 08/21/20  2325   Sodium mmol/L 137   Potassium mmol/L 4.7   Chloride mmol/L 103   CO2 mmol/L 25   Glucose mg/dL 87   BUN, Bld mg/dL 18   Creatinine mg/dL 4.4*   Albumin g/dL 3.5   Total Bilirubin mg/dL 0.4   Alkaline Phosphatase U/L 92   AST U/L 9*   ALT U/L 9*   Magnesium mg/dL 1.7       Drug levels  (last 3 results):  No results for input(s): VANCOMYCINRA, VANCOMYCINPE, VANCOMYCINTR in the last 72 hours.    Microbiologic Results:  Microbiology Results (last 7 days)       Procedure Component Value Units Date/Time    Influenza A & B by Molecular [[id number]] Collected: 08/22/20 0114    Order Status: Completed Specimen: Nasopharyngeal Swab Updated: 08/22/20 0136     Influenza A, Molecular Negative     Influenza B, Molecular Negative     Flu A & B Source Nasal swab    Blood culture x two cultures. Draw prior to antibiotics. [[id number]] Collected: 08/21/20 2325    Order Status: Sent Specimen: Blood Updated: 08/21/20 2326    Blood culture x two cultures. Draw prior to antibiotics. [[id number]] Collected: 08/21/20 2325    Order Status: Sent Specimen: Blood from Antecubital, Right Hand Updated: 08/21/20 2326

## 2020-08-22 NOTE — EICU
eICU Note : New Admit :notified by the [facility]:    Brief HPI:  79-year-old male known case of end-stage renal disease on hemodialysis, hypertension, chronic atrial fibrillation on home Eliquis, GERD, BPH, previous CVA the patient had dialysis this morning, thereafter, Pt had fever this afternoon 103,No h/o SOB, no abdominal pain the patient generally feeling unwell.  Recent history of cough congestion or any other new symptoms.chest x-ray is not revealing any intrathoracic process.  Patient does not wear any supplemental oxygen at home.  Patient is a partial code.    Vital Signs :  Vitals:    08/22/20 0231 08/22/20 0246 08/22/20 0302   BP: (!) 89/50 (!) 97/56 (!) 102/55   Pulse: 71 69 67   SpO2: 97% 97% 98%         Camera Assessment :since in the emergency room    Data:  CXR normal, cardiac silhouette enlargement with changes from the previous sternotomy are noted.  No evidence of acute chest disease.  WBC 13.65, hemogram and 9.6, hematocrit 28.5, platelets 172.  Sodium 137, potassium 4.7, chloride 103, bicarb 25, BUN 18, creatinine 4.4 glucose 87    Impression and recommendations:  1.Septic Shock: on IV vancomycin and IV Zosyn.Lactate 2.3-2.1, blood cultures and urine cultures obtained and are pending.  Patient received 1 L normal saline in the ER.  2.history of seizures.  Controlled currently on Keppra.  3.COPD with asthma, chronically controlled.  When necessary duo nebs.  4.chronic atrial fibrillation: On home Eliquis.  EKG obtained in ER.    #5PUD DVT prophylaxis: SCDs and PPI on Eliquis        Keena Carvajal M.D  eICU Physician

## 2020-08-22 NOTE — CONSULTS
"  [facility]  Adult Nutrition  Consult Note    SUMMARY     Intervention: collaboration with care providers, enteral nutrition therapy     Recommendations     Recommendation:  1)  Add TF Novasource renal @ 20 ml/hr advancing to goal of 50 ml/hr + 105  ml flush q 4 hr  (providing 2400 kcals (100% EEN), 108 g (100% EPN), and 864 ml free water)     2) pureed pleasure feeds only ( do not send meal trays)  3) Add novasource renal BID PO   4) weigh pt weekly     Goals: 1)  TF initiated by f/u  Nutrition Goal Status: new  Communication of RD Recs: (POC, sticky note, second sign reviewed with MD)     Reason for Assessment     Reason For Assessment: consult  Diagnosis: septic shock  Relevant Medical History: CHF, AFIB, anemia, obesity, HTN, HLD, DM2, ESRD on HD, dementia, stroke, PEG, COPD, CAD  Interdisciplinary Rounds: did not attend    General Information Comments: 78 y/o male readmitted in < 1 week. Septic shock unknown source, encephalopathy. NFPE completed 20, moderate wasting seen. No wt loss per chart review. Per SLP pt eats small amounts of pureed foods for pleasure and takes TF nocturnally via PEG. NO formula in chart.    Nutrition Discharge Planning: to be determined- TF above     Nutrition Risk Screen     Nutrition Risk Screen: dysphagia or difficulty swallowing, tube feeding or parenteral nutrition     Nutrition/Diet History     Spiritual, Cultural Beliefs, Pent[employer]al Practices, Values that Affect Care: no  Food Allergies: NKFA  Factors Affecting Nutritional Intake: dysphagia, AMS     Anthropometrics  Height Method: Measured(8/3/20 office)  Height: 6' 1"  Height (inches): 73 in  Weight Method: Bed Scale  Weight: 78.5 kg (20)  Weight (lb): 173 lb  Ideal Body Weight (IBW), Male: 184 lb  % Ideal Body Weight, Male (lb): 94.02 %  BMI (Calculated): 22.8  BMI Grade: 18.5-24.9 - normal  Usual Body Weight (UBW), k.8 kg  % Usual Body Weight: 99.84  % Weight Change From Usual Weight: -0.37 " %     Lab/Procedures/Meds     Pertinent Labs Reviewed: reviewed  BMP  Lab Results   Component Value Date     08/21/2020    K 4.7 08/21/2020     08/21/2020    CO2 25 08/21/2020    BUN 18 08/21/2020    CREATININE 4.4 (H) 08/21/2020    CALCIUM 8.9 08/21/2020    ANIONGAP 9 08/21/2020    ESTGFRAFRICA 14 (A) 08/21/2020    EGFRNONAA 12 (A) 08/21/2020     Lab Results   Component Value Date    IRON 31 (L) 04/04/2019    TIBC 295 04/04/2019    FERRITIN 469 (H) 04/04/2019     Lab Results   Component Value Date    CALCIUM 8.9 08/21/2020    PHOS 4.0 08/19/2020     POCT Glucose   Date Value Ref Range Status   08/19/2020 94 70 - 110 mg/dL Final     Lab Results   Component Value Date    HGBA1C 8.3 (H) 03/21/2019       Pertinent Medications Reviewed: reviewed  Statin, renal MVI ( b complex, C, folic acid), zofran     Estimated/Assessed Needs     Weight Used For Calorie Calculations: 78.5 kg  Energy Calorie Requirements (kcal): 30-35 kcal/kg ( HD) = 6760-0600  Energy Need Method: Kcal/kg  Protein Requirements: 1.2-1.5 g protein/kg ( HD/ critical care) =  g protein  Weight Used For Protein Calculations: 78.5 kg  RDA Method (mL): urine output + 1000 ml or 1500 ml  CHO Requirement: 264-293 g        Nutrition Prescription Ordered     Current Diet Order: NPO     Evaluation of Received Nutrient/Fluid Intake     Energy Calories Required: not meeting needs  Protein Required: not meeting needs  Fluid Required: not meeting needs  Tolerance: other (see comments)(NPO)  % Intake of Estimated Energy Needs:0%  % Meal Intake: NPO     Nutrition Risk     Level of Risk/Frequency of Follow-up: moderate 2 x weekly      Assessment and Plan     Inadequate energy intake  R/t NPO   As evidenced by PO intakes < 50% EEN x 1 day  Intervention: above  continues         Monitor and Evaluation     Food and Nutrient Intake: energy intake  Food and Nutrient Adminstration: diet order, enteral and parenteral nutrition administration  Anthropometric  Measurements: weight  Biochemical Data, Medical Tests and Procedures: electrolyte and renal panel, gastrointestinal profile, glucose/endocrine profile  Nutrition-Focused Physical Findings: overall appearance     Malnutrition Assessment  Skin (Micronutrient): (Patrick = 12, no PI noted)  Subcutaneous Fat (Malnutrition): moderate depletion  Muscle Mass (Malnutrition): moderate depletion   Orbital Region (Subcutaneous Fat Loss): moderate depletion  Upper Arm Region (Subcutaneous Fat Loss): moderate depletion  Thoracic and Lumbar Region: moderate depletion   Sabianism Region (Muscle Loss): moderate depletion  Clavicle Bone Region (Muscle Loss): severe depletion  Clavicle and Acromion Bone Region (Muscle Loss): moderate depletion  Scapular Bone Region (Muscle Loss): (Unable to obtain)  Dorsal Hand (Muscle Loss): mild depletion  Patellar Region (Muscle Loss): moderate depletion  Anterior Thigh Region (Muscle Loss): moderate depletion  Posterior Calf Region (Muscle Loss): moderate depletion   Edema (Fluid Accumulation): 0-->no edema present   Subcutaneous Fat Loss (Final Summary): moderate protein-calorie malnutrition  Muscle Loss Evaluation (Final Summary): moderate protein-calorie malnutrition       Nutrition Follow-Up     RD Follow-up?: Yes

## 2020-08-22 NOTE — PT/OT/SLP EVAL
Speech Language Pathology Evaluation  Bedside Swallow    Patient Name:  Micheal Hunt   MRN:  [id number]  Admitting Diagnosis: Septic shock    Recommendations:                 General Recommendations:  Follow-up not indicated  Diet recommendations:  Other (see comments)(PEG for primary nutrition, Pleease feds puree), Other (see comments)(PEG for primary hydration, small amount of thin liquids for pleasure)   Aspiration Precautions: Eliminate distractions, Frequent oral care, HOB to 90 degrees, Puree for pleasure and Strict aspiration precautions   General Precautions: Standard, aphasia, fall  Communication strategies:  provide increased time to answer and go to room if call light pushed    History:     Past Medical History:   Diagnosis Date    NICK (acute kidney injury) 7/29/2016    Allergy     Anemia, mild 12/15/2014    Arthritis     Gout    Atrial fibrillation 03/2019    Benign essential HTN 3/27/2012    BMI 29.0-29.9,adult 5/10/2018    BPH (benign prostatic hyperplasia)     BPH (benign prostatic hyperplasia)     CAD (coronary artery disease) 2006    Carotid artery occlusion     60-70% FROYLAN, LICA < 50%    Chronic kidney disease     due to ibuprofen    Colon polyp     CRF (chronic renal failure), stage 5     Diabetes mellitus     Diverticulosis     ESRD (end stage renal disease) on dialysis     Gastritis     GERD (gastroesophageal reflux disease)     Gout     History of colon polyps 5/3/2018    HTN (hypertension) 3/27/2012    Hyperlipidemia     Hyperlipidemia     LLL pneumonia 6/14/2018    LVH (left ventricular hypertrophy)     Mesenteric ischemia     Murmur, cardiac 3/27/2012    GRICELDA (obstructive sleep apnea)     DOES NOT USE A MACHINE    Sinus problem     Stroke 03/2019    acute left PCA    Syncope and collapse        Past Surgical History:   Procedure Laterality Date    APPENDECTOMY      CARDIAC CATHETERIZATION  2012    LIMA to LAD patent, SVG to RCA    COLONOSCOPY  2011     "COLONOSCOPY N/A 5/3/2018    Procedure: COLONOSCOPY;  Surgeon: Messi Harris MD;  Location: [facility] ENDO;  Service: Endoscopy;  Laterality: N/A;    COLONOSCOPY N/A 8/18/2020    Procedure: COLONOSCOPY;  Surgeon: Messi Harris MD;  Location: [facility] ENDO;  Service: Endoscopy;  Laterality: N/A;    CORONARY ARTERY BYPASS GRAFT  4/2007    x 2 [place]    ESOPHAGOGASTRODUODENOSCOPY N/A 8/17/2020    Procedure: EGD (ESOPHAGOGASTRODUODENOSCOPY);  Surgeon: Eben Soto MD;  Location: [facility] ENDO;  Service: Endoscopy;  Laterality: N/A;    JOINT REPLACEMENT      left knee total replacement  X 3    mid leftt finger      from a cactuss    MOLE REMOVAL  2016    rotative cuff      no rotative cuffs on bilat shoulders has pins     SHOULDER SURGERY      shoulder surgery bilat  RIGHT X 4; LEFT X 3     Social History: Patient lives with daughter, son-in-law, and grandchildren.    Prior Intubation HX:  None noted.    Modified Barium Swallow: Pt's daughter reports he had [facility]. She reports they recommended use of the PEG for primary nutrition and hydration. She reports they give please feeds of thin liquids (coffe, water, juice, chicken broth) and puree (applesauce, yogurt, etc.).    Chest X-Rays: 8/21/2020- No evidence of acute chest disease since the prior exam.    Prior diet: On previous admission (last week), Patient was NPO with please feeds of puree and small amount of thin liquids.    Occupation/hobbies/homemaking: Retired.    Subjective     "I'm hungry and want to eat.", Patient oriented to  Person and place, but not time. Pt stated desire to consume pleasure feeds while admitted. SLP spoke with patient's daughter who also verbalized desire for patient to consume some pleasure feeds by mouth while admitted. Pt and pt's daughter educated on the risks and benefits of pleasure feeds. Pt's daughter verbalized understanding and stated the desire to move forward " "with pleasure feeds.     Patient goals: "I want to eat something."     Pain/Comfort:  · Pain Rating 1: 0/10    Objective:     Oral Musculature Evaluation  · Oral Musculature: right weakness  · Dentition: scattered dentition, other (see comments)(Missing back top and bottom molars)  · Secretion Management: right corner drooling, coughing on secretions  · Mucosal Quality: good  · Mandibular Strength and Mobility: WFL  · Oral Labial Strength and Mobility: impaired coordination, impaired retraction, impaired pursing(decreased right strength and ROM)  · Lingual Strength and Mobility: other (see comments)(Impaired Coordination)  · Velar Elevation: WFL  · Buccal Strength and Mobility: other (see comments)(Decreased right side strength)  · Volitional Cough: Strong  · Volitional Swallow: Able to palpate laryngeal rise  · Voice Prior to PO Intake: Clear  · Oral Musculature Comments: Pt with overt signs and symptoms of penetration/aspiration on secretions prior to PO trials    Bedside Swallow Eval:   Consistencies Assessed:  · Thin liquids - Tsp x3, Ice chips x3  · Puree -Tsp x3     Oral Phase:   · Anterior loss  · Decreased closure around utensil  · Drooling    Pharyngeal Phase:   · coughing/choking  · multiple spontaneous swallows  · throat clearing  · wet vocal quality after swallow    Compensatory Strategies  · None     Treatment: Follow up not indicated.    Assessment:     Micheal Hunt is a 79 y.o. male with an SLP diagnosis of Chronic Dysphagia.  He presents with overt clinical signs and symptoms of penetration/aspiration on all PO trials given.    Goals:   Multidisciplinary Problems     SLP Goals        Problem: SLP Goal    Goal Priority Disciplines Outcome   SLP Goal     SLP    Description: Pt will tolerate pleasure feeds of puree and thin liquids in small qualities.                   Plan:     · Plan of Care reviewed with:  patient, caregiver, daughter   · SLP Follow-Up:  No       Discharge recommendations:  " home with home health   Barriers to Discharge:  Safety Awareness to swallowing deficits. Daughter manages PEG and pleasure feeds    Time Tracking:     SLP Treatment Date:   08/22/20  Speech Start Time:  1040  Speech Stop Time:  1100     Speech Total Time (min):  20 min    Billable Minutes: [place] 20 Minutes     GRAEME Meraz-SLP  08/22/2020

## 2020-08-22 NOTE — PROGRESS NOTES
"Pharmacist Renal Dose Adjustment Note    Micheal Hunt is a 79 y.o. male being treated with Zosyn    Patient Data:    Vital Signs (Most Recent):  Temp: 97.6 °F (36.4 °C) (08/22/20 0026)  Pulse: 63 (08/22/20 0715)  Resp: 18 (08/22/20 0715)  BP: (!) 101/56 (08/22/20 0715)  SpO2: 100 % (08/22/20 0715)   Vital Signs (72h Range):  Temp:  [97.6 °F (36.4 °C)-102.6 °F (39.2 °C)]   Pulse:  [63-95]   Resp:  [18]   BP: ()/(50-89)   SpO2:  [93 %-100 %]      Recent Labs   Lab 08/18/20  0353 08/19/20  0433 08/21/20  2325   CREATININE 4.8* 6.6* 4.4*     Serum creatinine: 4.4 mg/dL (H) 08/21/20 2325  Estimated creatinine clearance: 14.9 mL/min (A)    Patient is on Zosyn 3.375 g IV Q6h over 30 minutes (intermittent infusion). Due to the patient's current sepsis diagnosis, zosyn via extended infusion provides better clinical outcomes regarding decreased mortality and decreased length of stay in comparison to intermittent infusion. Zosyn dose will be adjusted to Zosyn 4.5 g IV Q12h over 4 hours. Per pharmacy protocol, Zosyn can be adjusted automatically. Providers can override adjustment by re-ordering intermittent infusion with "dispense as written" in the administration instructions.      Pharmacist's Name: Susi Guido  Pharmacist's Extension: [phone]    "

## 2020-08-22 NOTE — ASSESSMENT & PLAN NOTE
Chronic, controlled.  Latest blood pressure and vitals reviewed-   Temp:  [97.6 °F (36.4 °C)-102.6 °F (39.2 °C)]   Pulse:  [67-95]   Resp:  [18]   BP: ()/(50-74)   SpO2:  [93 %-98 %] .   Home meds for hypertension were reviewed and noted below. Hospital anti-hypertensive changes were made as shown below.  Hypertension Medications             carvediloL (COREG) 12.5 MG tablet Take 6.5 tablets (81.25 mg total) by mouth 2 (two) times daily.        Hold Coreg upon admission in the setting of acute hypotension secondary to septic shock.

## 2020-08-22 NOTE — H&P
"[facility] Medicine  History & Physical    Patient Name: Micheal Hunt  MRN: [id number]  Admission Date: 8/21/2020  Attending Physician: Bill Cerna MD   Primary Care Provider: Pk Lakhani MD         Patient information was obtained from relative(s), past medical records and ER records.     Subjective:     Principal Problem:Septic shock    Chief Complaint:   Chief Complaint   Patient presents with    Fever     103 at home today; dialysis today        HPI: Micheal Hunt is an 79-year-old male with past medical history of end-stage renal disease on HD, hypertension, atrial fibrillation on home Eliquis, GERD, BPH, and previous CVA who presents to the emergency room tonight with reports of fever.  Information for HPI obtained from ER physician note as patient is a poor historian.  Patient with obvious disorientation and confusion upon my initial interview and physical exam, per ER physician this is reported as patient's baseline.  Per ER physician note, Micheal Hunt is a 79 y.o. male with PMHx of CAD, HTN, HLD, A-fib, and ESRD who presents to the ED via EMS for evaluation of fever and "feeling generally unwell". Patient had dialysis this morning. This afternoon, it was noted by family that patient had a fever of 103 at home. He denies chest pain, SOB, abdominal pain, N/V/D, recent cough, congestion, or other new symptoms. Patient has a history of stroke and chronic right sided weakness secondary to the stroke. He is still able to produce urine, denies pain with urination. Patient has no other medical concerns or complaints at this moment. PSHx includes CABG and EGD."  Patient not accompanied by any visitors during my initial interview and physical exam.  In the emergency room patient noted to meet septic shock criteria.  Patient was given 1 L normal saline in the emergency room and initiated on IV vancomycin and Zosyn.  Chest x-ray not revealing any acute " intrathoracic process.  Patient urine unable to be obtained, patient is end-stage renal disease patient.  Patient admitted to ICU on 08/22/2020 approximately 2:30 a.m..  I discussed patient case via telephone with his daughter, Tonya, who reports that patient confusion is at baseline. She states that patient lives alone, however his home is located in close proximity to her home and he has professional caregivers present throughout the day. She states that he does not wear supplemental oxygen at home and can take steps with a walker and maximum assistance.  PARTIAL CODE status verified with patient's daughter via telephone.          Past Medical History:   Diagnosis Date    NICK (acute kidney injury) 7/29/2016    Allergy     Anemia, mild 12/15/2014    Arthritis     Gout    Atrial fibrillation 03/2019    Benign essential HTN 3/27/2012    BMI 29.0-29.9,adult 5/10/2018    BPH (benign prostatic hyperplasia)     BPH (benign prostatic hyperplasia)     CAD (coronary artery disease) 2006    Carotid artery occlusion     60-70% FROLYAN, LICA < 50%    Chronic kidney disease     due to ibuprofen    Colon polyp     CRF (chronic renal failure), stage 5     Diabetes mellitus     Diverticulosis     ESRD (end stage renal disease) on dialysis     Gastritis     GERD (gastroesophageal reflux disease)     Gout     History of colon polyps 5/3/2018    HTN (hypertension) 3/27/2012    Hyperlipidemia     Hyperlipidemia     LLL pneumonia 6/14/2018    LVH (left ventricular hypertrophy)     Mesenteric ischemia     Murmur, cardiac 3/27/2012    GRICELDA (obstructive sleep apnea)     DOES NOT USE A MACHINE    Sinus problem     Stroke 03/2019    acute left PCA    Syncope and collapse        Past Surgical History:   Procedure Laterality Date    APPENDECTOMY      CARDIAC CATHETERIZATION  2012    LIMA to LAD patent, SVG to RCA    COLONOSCOPY  2011    COLONOSCOPY N/A 5/3/2018    Procedure: COLONOSCOPY;  Surgeon: Messi COATES  MD Kimberly;  Location: [facility] ENDO;  Service: Endoscopy;  Laterality: N/A;    COLONOSCOPY N/A 8/18/2020    Procedure: COLONOSCOPY;  Surgeon: Messi Harris MD;  Location: [facility] ENDO;  Service: Endoscopy;  Laterality: N/A;    CORONARY ARTERY BYPASS GRAFT  4/2007    x 2 [place]    ESOPHAGOGASTRODUODENOSCOPY N/A 8/17/2020    Procedure: EGD (ESOPHAGOGASTRODUODENOSCOPY);  Surgeon: Eben Soto MD;  Location: [facility] ENDO;  Service: Endoscopy;  Laterality: N/A;    JOINT REPLACEMENT      left knee total replacement  X 3    mid leftt finger      from a cactuss    MOLE REMOVAL  2016    rotative cuff      no rotative cuffs on bilat shoulders has pins     SHOULDER SURGERY      shoulder surgery bilat  RIGHT X 4; LEFT X 3       Review of patient's allergies indicates:   Allergen Reactions    Ace inhibitors Other (See Comments)     Cough    Angiotensin ii     Arb-angiotensin receptor antagonist Itching    Eplerenone Other (See Comments)     Marked bradycardia, 40, tiredness and weakness      Sulfa (sulfonamide antibiotics) Itching and Swelling     Patient says this was 10 years ago and doesn't remember what happened       No current facility-administered medications on file prior to encounter.      Current Outpatient Medications on File Prior to Encounter   Medication Sig    albuterol (PROVENTIL/VENTOLIN HFA) 90 mcg/actuation inhaler 2 puffs every 4 hours as needed for cough, wheeze, or shortness of breath (Patient not taking: Reported on 8/20/2020)    allopurinoL (ZYLOPRIM) 100 MG tablet 100 mg by FEEDING TUBE route once daily.    apixaban (ELIQUIS) 2.5 mg Tab 2.5 mg by FEEDING TUBE route 2 (two) times daily.    aspirin (ECOTRIN) 81 MG EC tablet 81 mg by FEEDING TUBE route once daily.    atorvastatin (LIPITOR) 40 MG tablet 1 tablet (40 mg total) by Per G Tube route once daily.    B complex-vitamin C-folic acid 800 mcg Chew 0.8 mg by Per G Tube route once daily.    carvediloL (COREG) 12.5 MG tablet Take  6.5 tablets (81.25 mg total) by mouth 2 (two) times daily. (Patient taking differently: 78.125 mg by Per G Tube route 2 (two) times daily. )    finasteride (PROSCAR) 5 mg tablet Take 1 tablet (5 mg total) by mouth once daily. (Patient taking differently: 5 mg by Per G Tube route once daily. )    fluticasone (FLONASE) 50 mcg/actuation nasal spray 2 sprays (100 mcg total) by Each Nare route once daily.    levETIRAcetam (KEPPRA) 100 mg/mL Soln 7.5 ml by PEG daily    melatonin 10 mg Tab Take by mouth nightly.    mupirocin (BACTROBAN) 2 % ointment Apply topically 3 (three) times daily.    nut.tx.imp.renal fxn,lac-reduc (NEPRO CARB STEADY ORAL) 240 mLs by FEEDING TUBE route 2 (two) times daily.    nut.tx.imp.renal fxn,lac-reduc (NEPRO CARB STEADY ORAL) 1,000 mLs by FEEDING TUBE route 2 (two) times daily.    pantoprazole (PROTONIX) 40 MG tablet Take 1 tablet (40 mg total) by mouth once daily. (Patient not taking: Reported on 6/30/2020)    polyethylene glycol (GLYCOLAX) 17 gram PwPk Take 17 g by mouth.    pramoxine-hydrocortisone cream Apply topically 3 (three) times daily.    senna-docusate 8.6-50 mg (PERICOLACE) 8.6-50 mg per tablet Take 1 tablet by mouth.    tamsulosin (FLOMAX) 0.4 mg Cap Take 1 capsule (0.4 mg total) by mouth once daily.    tiotropium bromide (SPIRIVA RESPIMAT) 1.25 mcg/actuation Mist Inhale 2.5 mcg into the lungs once daily. Controller    vit b cmplx 3-fa-vit c-biotin 1- mg-mg-mcg (NEPHRO-EMMETT RX) 1- mg-mg-mcg Tab Take 1 tablet by mouth once daily.     Family History     Problem Relation (Age of Onset)    Cancer Father    Heart disease Mother, Sister    Hypertension Brother    Pneumonia Sister    Stroke Sister    Sudden death Father        Tobacco Use    Smoking status: Never Smoker    Smokeless tobacco: Never Used   Substance and Sexual Activity    Alcohol use: No     Frequency: Never     Drinks per session: Patient refused     Binge frequency: Patient refused    Drug  use: No    Sexual activity: Not on file     Review of Systems   Unable to perform ROS: Acuity of condition (Patient with baseline confusion - unable to provide ROS)     Objective:     Vital Signs (Most Recent):  Temp: 97.6 °F (36.4 °C) (08/22/20 0026)  Pulse: 77 (08/22/20 0153)  Resp: 18 (08/21/20 2218)  BP: (!) 86/53 (08/22/20 0146)  SpO2: 96 % (08/22/20 0153) Vital Signs (24h Range):  Temp:  [97.6 °F (36.4 °C)-102.6 °F (39.2 °C)] 97.6 °F (36.4 °C)  Pulse:  [75-95] 77  Resp:  [18] 18  SpO2:  [93 %-98 %] 96 %  BP: ()/(51-74) 86/53     Weight: 78.5 kg (173 lb 1 oz)  Body mass index is 22.83 kg/m².    Physical Exam  Vitals signs and nursing note reviewed.   Constitutional:       General: He is not in acute distress.     Appearance: He is well-developed. He is not diaphoretic.   HENT:      Head: Normocephalic and atraumatic.   Eyes:      General:         Right eye: No discharge.         Left eye: No discharge.      Pupils: Pupils are equal, round, and reactive to light.      Comments: Eyeglasses in use   Neck:      Musculoskeletal: Normal range of motion.      Vascular: No JVD.   Cardiovascular:      Rate and Rhythm: Normal rate. Rhythm irregular.      Heart sounds: Murmur present.      Comments: Irregular, regular.  Consistent with atrial fibrillation.  Murmur present.  Pulmonary:      Effort: Pulmonary effort is normal. No respiratory distress.      Breath sounds: Normal breath sounds. No wheezing or rales.      Comments: Patient on room air during my initial interview and physical exam, patient in no acute respiratory distress.  Chest:      Chest wall: No tenderness.   Abdominal:      General: Bowel sounds are normal. There is no distension.      Palpations: Abdomen is soft.      Tenderness: There is no abdominal tenderness. There is no guarding or rebound.      Comments: Peg tube in place.   Genitourinary:     Comments: Exam Deferred     Musculoskeletal: Normal range of motion.         General: No tenderness  or deformity.   Skin:     General: Skin is warm and dry.      Capillary Refill: Capillary refill takes 2 to 3 seconds.      Findings: Bruising present. No erythema or rash.      Comments: Left upper extremity dialysis graft present, not accessed.  Positive thrill positive bruit.   Neurological:      Mental Status: He is alert.      Comments: Patient is awake, alert.  Patient oriented to self.  Patient disoriented to location, situation, and time.  Patient follows commands.  No evidence of facial droop.  Spontaneous movement noted to all 4 extremities.   Psychiatric:      Comments: Psychiatric assessment limited as patient has altered mental status, reportedly at baseline.           CRANIAL NERVES     CN III, IV, VI   Pupils are equal, round, and reactive to light.       Significant Labs:   CBC:   Recent Labs   Lab 08/21/20  2326   WBC 13.65*   HGB 9.6*   HCT 28.5*        CMP:   Recent Labs   Lab 08/21/20  2325      K 4.7      CO2 25   GLU 87   BUN 18   CREATININE 4.4*   CALCIUM 8.9   PROT 7.1   ALBUMIN 3.5   BILITOT 0.4   ALKPHOS 92   AST 9*   ALT 9*   ANIONGAP 9   EGFRNONAA 12*     Lactic acid- 2.3 >>2.1  Procalcitonin -1.59  COVID-negative  Influenza-negative    Significant Imaging: I have reviewed all pertinent imaging results/findings within the past 24 hours.     Chest x-ray:  Impression:       No evidence of acute chest disease since the prior exam.     EKG performed on 08/22/2020, impression as below:  Atrial fibrillation  Left axis deviation  LVH with QRS widening  Nonspecific T wave abnormality  Abnormal ECG  When compared with ECG of 16-AUG-2020 12:18,  T wave inversion more evident in Anterior-lateral leads    Assessment/Plan:     * Septic shock  This patient does have evidence of infective focus  My overall impression is septic shock.  Antibiotics given-   IV Vancomycin and IV Zosyn       Latest lactate reviewed, they are-  Recent Labs   Lab 08/21/20  2325 08/22/20  0147   LACTATE 2.3*  2.1       Organ dysfunction indicated by Encephalopathy and Acute respiratory failure  Source- Unknown - Chest xray WNL, Urine pending, Blood cultures obtained and pending.  Source control Achieved by- IV Vancomycin and IV Zosyn    Patient given 1L NS in ER. Admit to ICU for further monitoring.      Seizure  Chronic, controlled.  Continue home Keppra.  Seizure precautions.      COPD with asthma  Chronic, controlled.  Will utilize DuoNebs as needed.  Patient denies respiratory complaints upon admission to the hospital.  Continuous telemetry and SpO2 monitoring.  Utilize supplemental oxygen to maintain SpO2 greater than 90%.      Atrial fibrillation  Chronic, controlled. Continue home Eliquis. Continuous telemetry monitoring. EKG obtained in ER and reviewed.        Cerebrovascular accident (CVA) due to occlusion of left posterior cerebral artery  Noted, with residual right-sided weakness.  Fall precautions, aspiration precautions, neuro checks. Patient has a PEG tube present, unable to determine home PO diet regimen, will order swallow evaluation for further recommendations.        ESRD (end stage renal disease) on dialysis  Creatine stable for now. BMP reviewed- noted Estimated Creatinine Clearance: 14.9 mL/min (A) (based on SCr of 4.4 mg/dL (H)). according to latest data. Monitor UOP and serial BMP and adjust therapy as needed. Renally dose meds.            Long term (current) use of anticoagulants  Noted, continue home Eliquis for anticoagulation in the setting of atrial fibrillation.  Bleeding precautions.      Anemia due to chronic kidney disease, on chronic dialysis  Monitor and transfuse if patient becomes hemodynamically unstable or H/H < 7/21  Daily CBC.       Benign prostatic hyperplasia without lower urinary tract symptoms  Chronic, controlled.  Will continue home medication.      CAD (coronary artery disease), 2V CABG 2007  Chronic, controlled.  Will continue home medication.  Continuous telemetry  monitoring.      Hyperlipidemia  Chronic, controlled. Will continue home medication.    Lab Results   Component Value Date    CHOL 89 (L) 07/26/2018    CHOL 102 (L) 04/25/2018    CHOL 116 (L) 03/10/2017     Lab Results   Component Value Date    HDL 29 (L) 07/26/2018    HDL 31 (L) 04/25/2018    HDL 29 (L) 03/10/2017     Lab Results   Component Value Date    LDLCALC 46.4 (L) 07/26/2018    LDLCALC 47.6 (L) 04/25/2018    LDLCALC 62.8 (L) 03/10/2017     Lab Results   Component Value Date    TRIG 68 07/26/2018    TRIG 117 04/25/2018    TRIG 121 03/10/2017     Lab Results   Component Value Date    CHOLHDL 32.6 07/26/2018    CHOLHDL 30.4 04/25/2018    CHOLHDL 25.0 03/10/2017               Essential hypertension  Chronic, controlled.  Latest blood pressure and vitals reviewed-   Temp:  [97.6 °F (36.4 °C)-102.6 °F (39.2 °C)]   Pulse:  [67-95]   Resp:  [18]   BP: ()/(50-74)   SpO2:  [93 %-98 %] .   Home meds for hypertension were reviewed and noted below. Hospital anti-hypertensive changes were made as shown below.  Hypertension Medications             carvediloL (COREG) 12.5 MG tablet Take 6.5 tablets (81.25 mg total) by mouth 2 (two) times daily.        Hold Coreg upon admission in the setting of acute hypotension secondary to septic shock.        VTE Risk Mitigation (From admission, onward)    None        Critical care Time spent seeing patient( greater than 1/2 spent in direct contact) : 90 minutes    I discussed this case via telephone with the eICU Dr. Ramirez - she verbalizes understanding of patient case, agreeable to plan of care.    Yee Shah NP  Department of Hospital Medicine   [facility]    Addendum:  Patient seen and examined. I agree with the findings, assessment and plan as outlined in the note by the nursing practitioner.  Patient is an 87-year-old  male with past medical history significant for multiple medical problems including dementia, chronic atrial fibrillation  (on Eliquis therapy), hypertension, hyperlipidemia, gastroesophageal reflux disease and prior history of cerebrovascular accident who received hemodialysis 3 times weekly for end-stage renal disease has been admitted to Hospital Medicine in intensive care unit with septic shock with fever 103 degree F. patient required IV fluid hydration in the emergency room and has been initiated on intravenous antibiotics vancomycin and Zosyn antibiotics.    On exam patient is in no acute distress, oriented to self.  Lungs clear to auscultate bilaterally.  First and sec heart sounds without any tones, murmur or gallops.  Abdominal exam is benign.    Laboratory results are significant for elevated procalcitonin while chest x-ray does not report any acute pulmonary infiltrate.    According to patient's daughter miss Castaneda, patient has peg tube placement from which patient receive nutrition.  In addition patient also eats for pleasure feeding.  This morning patient was evaluated by speech therapist who recommended to keep patient NPO for ongoing swallowing dysfunction.    Plan includes use intravenous pressor agent as needed to maintain mean arterial pressure above 65 mm of mercury.  Hold antihypertensive agents.  Follow microbiology results.  Continue intravenous antibiotics.  Consult nephrologist for routine hemodialysis care.  Patient likely has aspiration pneumonitis which is not showing up on chest x-ray as of yet.  Patient to maintain NPO for now.  Discussed with patient's daughter who confirmed DNR code status.  Maintain patient on gastric ulcer prophylaxis and deep venous thrombosis prophylaxis.

## 2020-08-22 NOTE — HPI
"Micheal Hunt is an 79-year-old male with past medical history of end-stage renal disease on HD, hypertension, atrial fibrillation on home Eliquis, GERD, BPH, and previous CVA who presents to the emergency room tonight with reports of fever.  Information for HPI obtained from ER physician note as patient is a poor historian.  Patient with obvious disorientation and confusion upon my initial interview and physical exam, per ER physician this is reported as patient's baseline.  Per ER physician note, Micheal Hunt is a 79 y.o. male with PMHx of CAD, HTN, HLD, A-fib, and ESRD who presents to the ED via EMS for evaluation of fever and "feeling generally unwell". Patient had dialysis this morning. This afternoon, it was noted by family that patient had a fever of 103 at home. He denies chest pain, SOB, abdominal pain, N/V/D, recent cough, congestion, or other new symptoms. Patient has a history of stroke and chronic right sided weakness secondary to the stroke. He is still able to produce urine, denies pain with urination. Patient has no other medical concerns or complaints at this moment. PSHx includes CABG and EGD."  Patient not accompanied by any visitors during my initial interview and physical exam.  In the emergency room patient noted to meet septic shock criteria.  Patient was given 1 L normal saline in the emergency room and initiated on IV vancomycin and Zosyn.  Chest x-ray not revealing any acute intrathoracic process.  Patient urine unable to be obtained, patient is end-stage renal disease patient.  Patient admitted to ICU on 08/22/2020 approximately 2:30 a.m..  I discussed patient case via telephone with his daughter, Tonya, who reports that patient confusion is at baseline. She states that patient lives alone, however his home is located in close proximity to her home and he has professional caregivers present throughout the day. She states that he does not wear supplemental oxygen at home " and can take steps with a walker and maximum assistance.  PARTIAL CODE status verified with patient's daughter via telephone.

## 2020-08-23 NOTE — PLAN OF CARE
Spoke with the pt's daughter, Tonya to complete assessment; she verified the pt has a care giver 12hrs/day, Dr Lakhani is his PCP and he has Medicare and [facility] insurance.   He goes to [facility] on M[facility].   The pt was recently admitted with a different diagnosis, GI issues and returned this time with a fever.  He has not followed up with his PCP but she did speak with his office who told her an [facility] NP would be able to come out to his home and see the pt.   He also has a home tube feeding pump. She was also asking about a Arturo button for his Peg, I told her to follow up with his PCP regarding this and she verbalized understanding.   At the time of discharge the pt will need orders to resume [facility] and a referral for [facility] FU.   CM will continue to follow and assist as needed.....ANTONIO Brar CM    08/23/20 [id number]   Discharge Assessment   Assessment Type Discharge Planning Assessment   Confirmed/corrected address and phone number on facesheet? Yes   Assessment information obtained from? Other  (daughter, Tonya [phone])   Expected Length of Stay (days) 3   Communicated expected length of stay with patient/caregiver yes   Prior to hospitilization cognitive status: Alert/Oriented   Prior to hospitalization functional status: Assistive Equipment;Needs Assistance   Lives With alone  (with a caregiver 12hrs/day)   Able to Return to Prior Arrangements yes   Who are your caregiver(s) and their phone number(s)? Maya Pulido   Patient's perception of discharge disposition admitted as an inpatient   Readmission Within the Last 30 Days current reason for admission unrelated to previous admission   If yes, most recent facility name: [facility]   Patient currently being followed by outpatient case management? No   Patient currently receives any other outside agency services? Yes   Name and contact number of agency or person providing outside services [facility]   Is it the patient/care giver preference to resume care with  the current outside agency? Yes   Equipment Currently Used at Home raised toilet;hospital bed;lift device;wheelchair;bedside commode;walker, nikita   Do you have any problems affording any of your prescribed medications? No   Is the patient taking medications as prescribed? yes   Does the patient have transportation home? Yes   Transportation Anticipated family or friend will provide   Dialysis Name and Scheduled days [facility]   Discharge Plan A Home Health   Discharge Plan B Home Health   Patient/Family in Agreement with Plan yes   Readmission Questionnaire   At the time of your discharge, did someone talk to you about what your health problems were? Yes   At the time of discharge, did someone talk to you about what to watch out for regarding worsening of your health problem? Yes   At the time of discharge, did someone talk to you about what to do if you experienced worsening of your health problem? Yes

## 2020-08-23 NOTE — CONSULTS
" INPATIENT NEPHROLOGY CONSULT   [facility] NEPHROLOGY    Micheal Hunt  08/23/2020    Reason for consultation:    esrd    Chief Complaint:   Chief Complaint   Patient presents with    Fever     103 at home today; dialysis today          History of Present Illness:    As per H and P    Micheal Hunt is an 79-year-old male with past medical history of end-stage renal disease on HD, hypertension, atrial fibrillation on home Eliquis, GERD, BPH, and previous CVA who presents to the emergency room tonight with reports of fever.  Information for HPI obtained from ER physician note as patient is a poor historian.  Patient with obvious disorientation and confusion upon my initial interview and physical exam, per ER physician this is reported as patient's baseline.  Per ER physician note, Micheal Hunt is a 79 y.o. male with PMHx of CAD, HTN, HLD, A-fib, and ESRD who presents to the ED via EMS for evaluation of fever and "feeling generally unwell". Patient had dialysis this morning. This afternoon, it was noted by family that patient had a fever of 103 at home. He denies chest pain, SOB, abdominal pain, N/V/D, recent cough, congestion, or other new symptoms. Patient has a history of stroke and chronic right sided weakness secondary to the stroke. He is still able to produce urine, denies pain with urination. Patient has no other medical concerns or complaints at this moment. PSHx includes CABG and EGD."  Patient not accompanied by any visitors during my initial interview and physical exam.  In the emergency room patient noted to meet septic shock criteria.  Patient was given 1 L normal saline in the emergency room and initiated on IV vancomycin and Zosyn.  Chest x-ray not revealing any acute intrathoracic process.  Patient urine unable to be obtained, patient is end-stage renal disease patient.  Patient admitted to ICU on 08/22/2020 approximately 2:30 a.m..    8/22 No nausea, chest pain, sob, fever, " urinary or bowel complaint, new neurologic symptoms, new joint pain,  8/23  NO CHEST PAIN OR SOB, no complaints whatsoever     Plan of Care:       Assessment:    esrd  --continue dialysis per routine  --fluid restrict  --renal dose medication per routine  --continue outpt medication  --continue binders with meals    Anemia  --erythropoiesis stimulating agent with renal replacement therapy    Fever--afebrile this afternoon  --empiric abx  --cxr neg  --f/u cultures    A-fib-rate controlled    H/o COPD  --compensated       Thank you for allowing us to participate in this patient's care. We will continue to follow.    Vital Signs:  Temp Readings from Last 3 Encounters:   08/23/20 97.2 °F (36.2 °C)   08/19/20 96.5 °F (35.8 °C) (Oral)   08/03/20 98.2 °F (36.8 °C) (Skin)       Pulse Readings from Last 3 Encounters:   08/23/20 (!) 58   08/19/20 78   08/03/20 62       BP Readings from Last 3 Encounters:   08/23/20 104/60   08/19/20 120/70   08/03/20 (!) 150/66       Weight:  Wt Readings from Last 3 Encounters:   08/23/20 82.5 kg (181 lb 14.1 oz)   08/18/20 78.5 kg (173 lb)   08/03/20 81.8 kg (180 lb 5.4 oz)       Past Medical & Surgical History:  Past Medical History:   Diagnosis Date    NICK (acute kidney injury) 7/29/2016    Allergy     Anemia, mild 12/15/2014    Arthritis     Gout    Atrial fibrillation 03/2019    Benign essential HTN 3/27/2012    BMI 29.0-29.9,adult 5/10/2018    BPH (benign prostatic hyperplasia)     BPH (benign prostatic hyperplasia)     CAD (coronary artery disease) 2006    Carotid artery occlusion     60-70% FROYLAN, LICA < 50%    Chronic kidney disease     due to ibuprofen    Colon polyp     CRF (chronic renal failure), stage 5     Diabetes mellitus     Diverticulosis     ESRD (end stage renal disease) on dialysis     Gastritis     GERD (gastroesophageal reflux disease)     Gout     History of colon polyps 5/3/2018    HTN (hypertension) 3/27/2012    Hyperlipidemia      Hyperlipidemia     LLL pneumonia 6/14/2018    LVH (left ventricular hypertrophy)     Mesenteric ischemia     Murmur, cardiac 3/27/2012    GRICELDA (obstructive sleep apnea)     DOES NOT USE A MACHINE    Sinus problem     Stroke 03/2019    acute left PCA    Syncope and collapse        Past Surgical History:   Procedure Laterality Date    APPENDECTOMY      CARDIAC CATHETERIZATION  2012    LIMA to LAD patent, SVG to RCA    COLONOSCOPY  2011    COLONOSCOPY N/A 5/3/2018    Procedure: COLONOSCOPY;  Surgeon: Messi Harris MD;  Location: [facility] ENDO;  Service: Endoscopy;  Laterality: N/A;    COLONOSCOPY N/A 8/18/2020    Procedure: COLONOSCOPY;  Surgeon: Messi Harris MD;  Location: [facility] ENDO;  Service: Endoscopy;  Laterality: N/A;    CORONARY ARTERY BYPASS GRAFT  4/2007    x 2 [place]    ESOPHAGOGASTRODUODENOSCOPY N/A 8/17/2020    Procedure: EGD (ESOPHAGOGASTRODUODENOSCOPY);  Surgeon: Eben Soto MD;  Location: [facility] ENDO;  Service: Endoscopy;  Laterality: N/A;    JOINT REPLACEMENT      left knee total replacement  X 3    mid leftt finger      from a cactuss    MOLE REMOVAL  2016    rotative cuff      no rotative cuffs on bilat shoulders has pins     SHOULDER SURGERY      shoulder surgery bilat  RIGHT X 4; LEFT X 3       Past Social History:  Social History     Socioeconomic History    Marital status:      Spouse name: Not on file    Number of children: Not on file    Years of education: Not on file    Highest education level: Not on file   Occupational History    Not on file   Social Needs    Financial resource strain: Somewhat hard    Food insecurity     Worry: Often true     Inability: Often true    Transportation needs     Medical: Yes     Non-medical: Yes   Tobacco Use    Smoking status: Never Smoker    Smokeless tobacco: Never Used   Substance and Sexual Activity    Alcohol use: No     Frequency: Never     Drinks per session: Patient refused     Binge frequency: Patient  refused    Drug use: No    Sexual activity: Not on file   Lifestyle    Physical activity     Days per week: Not on file     Minutes per session: Not on file    Stress: Not on file   Relationships    Social connections     Talks on phone: More than three times a week     Gets together: Three times a week     Attends Sabianism service: Not on file     Active member of club or organization: Patient refused     Attends meetings of clubs or organizations: Patient refused     Relationship status:    Other Topics Concern    Not on file   Social History Narrative    [place]; daughter nearby       Medications:  No current facility-administered medications on file prior to encounter.      Current Outpatient Medications on File Prior to Encounter   Medication Sig Dispense Refill    albuterol (PROVENTIL/VENTOLIN HFA) 90 mcg/actuation inhaler 2 puffs every 4 hours as needed for cough, wheeze, or shortness of breath (Patient not taking: Reported on 8/20/2020)      allopurinoL (ZYLOPRIM) 100 MG tablet 100 mg by FEEDING TUBE route once daily.      apixaban (ELIQUIS) 2.5 mg Tab 2.5 mg by FEEDING TUBE route 2 (two) times daily.      aspirin (ECOTRIN) 81 MG EC tablet 81 mg by FEEDING TUBE route once daily.      atorvastatin (LIPITOR) 40 MG tablet 1 tablet (40 mg total) by Per G Tube route once daily. 90 tablet 4    B complex-vitamin C-folic acid 800 mcg Chew 0.8 mg by Per G Tube route once daily.      carvediloL (COREG) 12.5 MG tablet Take 6.5 tablets (81.25 mg total) by mouth 2 (two) times daily. (Patient taking differently: 78.125 mg by Per G Tube route 2 (two) times daily. ) 90 tablet 4    finasteride (PROSCAR) 5 mg tablet Take 1 tablet (5 mg total) by mouth once daily. (Patient taking differently: 5 mg by Per G Tube route once daily. ) 90 tablet 3    fluticasone (FLONASE) 50 mcg/actuation nasal spray 2 sprays (100 mcg total) by Each Nare route once daily. 3 Bottle 3    levETIRAcetam (KEPPRA) 100  mg/mL Soln 7.5 ml by PEG daily 120 mL 5    melatonin 10 mg Tab Take by mouth nightly.      mupirocin (BACTROBAN) 2 % ointment Apply topically 3 (three) times daily. 1 Tube 2    nut.tx.imp.renal fxn,lac-reduc (NEPRO CARB STEADY ORAL) 240 mLs by FEEDING TUBE route 2 (two) times daily.      nut.tx.imp.renal fxn,lac-reduc (NEPRO CARB STEADY ORAL) 1,000 mLs by FEEDING TUBE route 2 (two) times daily.      pantoprazole (PROTONIX) 40 MG tablet Take 1 tablet (40 mg total) by mouth once daily. (Patient not taking: Reported on 6/30/2020) 30 tablet 11    polyethylene glycol (GLYCOLAX) 17 gram PwPk Take 17 g by mouth.      pramoxine-hydrocortisone cream Apply topically 3 (three) times daily. 57 g 3    senna-docusate 8.6-50 mg (PERICOLACE) 8.6-50 mg per tablet Take 1 tablet by mouth.      tamsulosin (FLOMAX) 0.4 mg Cap Take 1 capsule (0.4 mg total) by mouth once daily. 90 capsule 2    tiotropium bromide (SPIRIVA RESPIMAT) 1.25 mcg/actuation Mist Inhale 2.5 mcg into the lungs once daily. Controller 4 g 5    vit b cmplx 3-fa-vit c-biotin 1- mg-mg-mcg (NEPHRO-EMMETT RX) 1- mg-mg-mcg Tab Take 1 tablet by mouth once daily. 90 tablet 3     Scheduled Meds:   sodium chloride 0.9%   Intravenous Once    allopurinoL  100 mg Oral Daily    apixaban  2.5 mg Oral BID    aspirin  81 mg Oral Daily    atorvastatin  40 mg Per G Tube Daily    [START ON 8/24/2020] epoetin kitty-ebpx (RETACRIT) injection  10,000 Units Subcutaneous Every Mon, Wed, Fri    famotidine (PF)  20 mg Intravenous Daily    finasteride  5 mg Per G Tube Daily    fluticasone propionate  2 spray Each Nostril Daily    levetiracetam oral soln  7.5 mL Per G Tube Daily    piperacillin-tazobactam (ZOSYN) IVPB  4.5 g Intravenous Q12H    tamsulosin  1 capsule Oral Daily    vitamin renal formula (B-complex-vitamin c-folic acid)  1 capsule Oral Daily     Continuous Infusions:  PRN Meds:.sodium chloride 0.9%, acetaminophen, albuterol-ipratropium, ondansetron,  "sodium chloride 0.9%, Pharmacy to dose Vancomycin consult **AND** vancomycin - pharmacy to dose    Allergies:  Ace inhibitors, Angiotensin ii, Arb-angiotensin receptor antagonist, Eplerenone, and Sulfa (sulfonamide antibiotics)    Past Family History:  Reviewed; refer to Hospitalist Admission Note    Review of Systems:  Review of Systems - All 14 systems reviewed and negative, except as noted in HPI    Physical Exam:    /60   Pulse (!) 58   Temp 97.2 °F (36.2 °C)   Resp 18   Ht 6' 1" (1.854 m)   Wt 82.5 kg (181 lb 14.1 oz)   SpO2 100%   BMI 24.00 kg/m²     General Appearance:    Alert, cooperative, no distress, appears stated age   Head:    Normocephalic, without obvious abnormality, atraumatic   Eyes:    PER, conjunctiva/corneas clear, EOM's intact in both eyes        Throat:   Lips, mucosa, and tongue normal; teeth and gums normal   Back:     Symmetric, no curvature, ROM normal, no CVA tenderness   Lungs:     Clear to auscultation bilaterally, respirations unlabored   Chest wall:    No tenderness or deformity   Heart:    Regular rate and rhythm, S1 and S2 normal, no murmur, rub   or gallop   Abdomen:     Soft, non-tender, bowel sounds active all four quadrants,     no masses, no organomegaly   Extremities:   Extremities normal, atraumatic, no cyanosis or edema   Pulses:   2+ and symmetric all extremities   MSK:   No joint or muscle swelling, tenderness or deformity   Skin:   Skin color, texture, turgor normal, no rashes or lesions   Neurologic:   .  No flap     Results:  Lab Results   Component Value Date     (L) 08/23/2020    K 4.2 08/23/2020     08/23/2020    CO2 19 (L) 08/23/2020    BUN 31 (H) 08/23/2020    CREATININE 6.1 (H) 08/23/2020    CALCIUM 8.3 (L) 08/23/2020    ANIONGAP 11 08/23/2020    ESTGFRAFRICA 9 (A) 08/23/2020    EGFRNONAA 8 (A) 08/23/2020       Lab Results   Component Value Date    CALCIUM 8.3 (L) 08/23/2020    PHOS 4.0 08/19/2020       Recent Labs   Lab 08/23/20  0339 "   WBC 7.55   RBC 2.35*   HGB 7.8*   HCT 24.7*   *   *   MCH 33.2*   MCHC 31.6*       I have personally reviewed pertinent radiological imaging and reports.

## 2020-08-23 NOTE — PROGRESS NOTES
Pharmacokinetic Assessment Follow Up: IV Vancomycin    Vancomycin serum concentration assessment(s):    The random level was drawn correctly and can be used to guide therapy at this time. The measurement is below the desired definitive target range of 15 to 20 mcg/mL.    Vancomycin Regimen Plan:    Continue dosing by levels. Will give Vancomycin 500 mg IV once with next random vancomycin level prior to HD on 08/24/20 @ 0430.    Drug levels (last 3 results):  Recent Labs   Lab Result Units 08/22/20  2343   Vancomycin, Random ug/mL 13.7       Pharmacy will continue to follow and monitor vancomycin.    Please contact pharmacy at extension [phone] for questions regarding this assessment.    Thank you for the consult,   Susi Guido       Patient brief summary:  Micheal Hunt is a 79 y.o. male initiated on antimicrobial therapy with IV Vancomycin for treatment of bacteremia    The patient's current regimen is pulse dosing.    Drug Allergies:   Review of patient's allergies indicates:   Allergen Reactions    Ace inhibitors Other (See Comments)     Cough    Angiotensin ii     Arb-angiotensin receptor antagonist Itching    Eplerenone Other (See Comments)     Marked bradycardia, 40, tiredness and weakness      Sulfa (sulfonamide antibiotics) Itching and Swelling     Patient says this was 10 years ago and doesn't remember what happened       Actual Body Weight:   82.5 kg    Renal Function:   Estimated Creatinine Clearance: 10.9 mL/min (A) (based on SCr of 6.1 mg/dL (H)).,     Dialysis Method (if applicable):  intermittent HD    CBC (last 72 hours):  Recent Labs   Lab Result Units 08/21/20  2326 08/23/20  0339   WBC K/uL 13.65* 7.55   Hemoglobin g/dL 9.6* 7.8*   Hematocrit % 28.5* 24.7*   Platelets K/uL 172 134*   Gran% % 90.3* 69.8   Lymph% % 4.2* 18.4   Mono% % 3.9* 6.6   Eosinophil% % 1.0 4.6   Basophil% % 0.2 0.3   Differential Method  Automated Automated       Metabolic Panel (last 72 hours):  Recent Labs   Lab  Result Units 08/21/20 2325 08/22/20  1327 08/23/20  0339   Sodium mmol/L 137  --  134*   Potassium mmol/L 4.7  --  4.2   Chloride mmol/L 103  --  104   CO2 mmol/L 25  --  19*   Glucose mg/dL 87  --  107   BUN, Bld mg/dL 18  --  31*   Creatinine mg/dL 4.4* 5.1* 6.1*   Albumin g/dL 3.5  --  2.9*   Total Bilirubin mg/dL 0.4  --  0.4   Alkaline Phosphatase U/L 92  --  67   AST U/L 9*  --  6*   ALT U/L 9*  --  6*   Magnesium mg/dL 1.7  --  1.9       Vancomycin Administrations:  vancomycin given in the last 96 hours                     vancomycin 1.25 g in dextrose 5% 250 mL IVPB (ready to mix) (mg) 1,250 mg New Bag 08/22/20 0044                    Microbiologic Results:  Microbiology Results (last 7 days)       Procedure Component Value Units Date/Time    Blood culture x two cultures. Draw prior to antibiotics. [[id number]] Collected: 08/21/20 2325    Order Status: Completed Specimen: Blood Updated: 08/22/20 1715     Blood Culture, Routine No Growth to date    Narrative:      Aerobic and anaerobic    Blood culture x two cultures. Draw prior to antibiotics. [[id number]] Collected: 08/21/20 2325    Order Status: Completed Specimen: Blood from Antecubital, Right Hand Updated: 08/22/20 1715     Blood Culture, Routine No Growth to date    Narrative:      Aerobic and anaerobic    Influenza A & B by Molecular [[id number]] Collected: 08/22/20 0114    Order Status: Completed Specimen: Nasopharyngeal Swab Updated: 08/22/20 0136     Influenza A, Molecular Negative     Influenza B, Molecular Negative     Flu A & B Source Nasal swab

## 2020-08-23 NOTE — SUBJECTIVE & OBJECTIVE
Interval History:  Patient is in intensive care unit for management of septic shock.  Patient is doing well.  Blood pressure has improved.  Patient remains afebrile.  No leukocytosis is noted.  Patient exhibits intermittent confusion.  Patient is scheduled for hemodialysis therapy today.  Patient denies any chest pain or shortness of breath.    Review of Systems   Unable to perform ROS: Acuity of condition (Patient with baseline confusion - unable to provide ROS)     Objective:     Vital Signs (Most Recent):  Temp: 98.4 °F (36.9 °C) (08/23/20 0322)  Pulse: (!) 55 (08/23/20 0700)  Resp: (!) 22 (08/23/20 0700)  BP: 109/60 (08/23/20 0700)  SpO2: 98 % (08/23/20 0700) Vital Signs (24h Range):  Temp:  [97.4 °F (36.3 °C)-98.5 °F (36.9 °C)] 98.4 °F (36.9 °C)  Pulse:  [] 55  Resp:  [17-48] 22  SpO2:  [90 %-100 %] 98 %  BP: ()/(54-74) 109/60     Weight: 82.5 kg (181 lb 14.1 oz)  Body mass index is 24 kg/m².    Intake/Output Summary (Last 24 hours) at 8/23/2020 0950  Last data filed at 8/23/2020 0800  Gross per 24 hour   Intake 1015 ml   Output 0 ml   Net 1015 ml      Physical Exam  Vitals signs and nursing note reviewed.   Constitutional:       General: He is not in acute distress.     Appearance: He is well-developed. He is not diaphoretic.   HENT:      Head: Normocephalic and atraumatic.   Eyes:      General:         Right eye: No discharge.         Left eye: No discharge.      Pupils: Pupils are equal, round, and reactive to light.      Comments: Eyeglasses in use   Neck:      Musculoskeletal: Normal range of motion.      Vascular: No JVD.   Cardiovascular:      Rate and Rhythm: Normal rate. Rhythm irregular.      Heart sounds: Murmur present.      Comments: Irregular, regular.  Consistent with atrial fibrillation.  Murmur present.  Pulmonary:      Effort: Pulmonary effort is normal. No respiratory distress.      Breath sounds: Normal breath sounds. No wheezing or rales.      Comments: Patient on room air  during my initial interview and physical exam, patient in no acute respiratory distress.  Chest:      Chest wall: No tenderness.   Abdominal:      General: Bowel sounds are normal. There is no distension.      Palpations: Abdomen is soft.      Tenderness: There is no abdominal tenderness. There is no guarding or rebound.      Comments: Peg tube in place.   Genitourinary:     Comments: Exam Deferred     Musculoskeletal: Normal range of motion.         General: No tenderness or deformity.   Skin:     General: Skin is warm and dry.      Capillary Refill: Capillary refill takes 2 to 3 seconds.      Findings: Bruising present. No erythema or rash.      Comments: Left upper extremity dialysis graft present, not accessed.  Positive thrill positive bruit.   Neurological:      Mental Status: He is alert.      Comments: Patient is awake, alert.  Patient oriented to self.  Patient disoriented to location, situation, and time.  Patient follows commands.  No evidence of facial droop.  Spontaneous movement noted to all 4 extremities.   Psychiatric:      Comments: Psychiatric assessment limited as patient has altered mental status, reportedly at baseline.         Significant Labs:   CBC:   Recent Labs   Lab 08/21/20 2326 08/23/20  0339   WBC 13.65* 7.55   HGB 9.6* 7.8*   HCT 28.5* 24.7*    134*     CMP:   Recent Labs   Lab 08/21/20 2325 08/22/20  1327 08/23/20  0339     --  134*   K 4.7  --  4.2     --  104   CO2 25  --  19*   GLU 87  --  107   BUN 18  --  31*   CREATININE 4.4* 5.1* 6.1*   CALCIUM 8.9  --  8.3*   PROT 7.1  --  5.9*   ALBUMIN 3.5  --  2.9*   BILITOT 0.4  --  0.4   ALKPHOS 92  --  67   AST 9*  --  6*   ALT 9*  --  6*   ANIONGAP 9  --  11   EGFRNONAA 12* 10* 8*     Microbiology Results (last 7 days)     Procedure Component Value Units Date/Time    Blood culture x two cultures. Draw prior to antibiotics. [[id number]] Collected: 08/21/20 2325    Order Status: Completed Specimen: Blood Updated:  08/22/20 1715     Blood Culture, Routine No Growth to date    Narrative:      Aerobic and anaerobic    Blood culture x two cultures. Draw prior to antibiotics. [[id number]] Collected: 08/21/20 2325    Order Status: Completed Specimen: Blood from Antecubital, Right Hand Updated: 08/22/20 1715     Blood Culture, Routine No Growth to date    Narrative:      Aerobic and anaerobic    Influenza A & B by Molecular [[id number]] Collected: 08/22/20 0114    Order Status: Completed Specimen: Nasopharyngeal Swab Updated: 08/22/20 0136     Influenza A, Molecular Negative     Influenza B, Molecular Negative     Flu A & B Source Nasal swab        Significant Imaging:   CXR: No evidence of acute chest disease since the prior exam.

## 2020-08-23 NOTE — PLAN OF CARE
08/22/20 1956   Patient Assessment/Suction   Level of Consciousness (AVPU) alert   Respiratory Effort Unlabored   Expansion/Accessory Muscles/Retractions no use of accessory muscles   PRE-TX-O2   O2 Device (Oxygen Therapy) room air   SpO2 100 %   Pulse Oximetry Type Continuous   $ Pulse Oximetry - Multiple Charge Pulse Oximetry - Multiple   Pulse 70   Resp (!) 24   Aerosol Therapy   $ Aerosol Therapy Charges PRN treatment not required   Respiratory Treatment Status (SVN) PRN treatment not required

## 2020-08-23 NOTE — PLAN OF CARE
POC reviewed with patient and patient's family, understanding verbalized. Turned q 2 for bedbound status and limited mobility. NPO maintained. Telemetry monitoring maintained. VS stable throughout shift. Safety maintained. Will continue to monitor.

## 2020-08-23 NOTE — PROGRESS NOTES
"[facility] Medicine  Progress Note    Patient Name: Micheal Hunt  MRN: [id number]  Patient Class: IP- Inpatient   Admission Date: 8/21/2020  Length of Stay: 1 days  Attending Physician: Charissa Olivas MD  Primary Care Provider: Pk Lakhani MD        Subjective:     Principal Problem:Septic shock        HPI:  Micheal Hunt is an 79-year-old male with past medical history of end-stage renal disease on HD, hypertension, atrial fibrillation on home Eliquis, GERD, BPH, and previous CVA who presents to the emergency room tonight with reports of fever.  Information for HPI obtained from ER physician note as patient is a poor historian.  Patient with obvious disorientation and confusion upon my initial interview and physical exam, per ER physician this is reported as patient's baseline.  Per ER physician note, Micheal Hunt is a 79 y.o. male with PMHx of CAD, HTN, HLD, A-fib, and ESRD who presents to the ED via EMS for evaluation of fever and "feeling generally unwell". Patient had dialysis this morning. This afternoon, it was noted by family that patient had a fever of 103 at home. He denies chest pain, SOB, abdominal pain, N/V/D, recent cough, congestion, or other new symptoms. Patient has a history of stroke and chronic right sided weakness secondary to the stroke. He is still able to produce urine, denies pain with urination. Patient has no other medical concerns or complaints at this moment. PSHx includes CABG and EGD."  Patient not accompanied by any visitors during my initial interview and physical exam.  In the emergency room patient noted to meet septic shock criteria.  Patient was given 1 L normal saline in the emergency room and initiated on IV vancomycin and Zosyn.  Chest x-ray not revealing any acute intrathoracic process.  Patient urine unable to be obtained, patient is end-stage renal disease patient.  Patient admitted to ICU on 08/22/2020 approximately " 2:30 a.m..  I discussed patient case via telephone with his daughter, Tonya, who reports that patient confusion is at baseline. She states that patient lives alone, however his home is located in close proximity to her home and he has professional caregivers present throughout the day. She states that he does not wear supplemental oxygen at home and can take steps with a walker and maximum assistance.  PARTIAL CODE status verified with patient's daughter via telephone.          Overview/Hospital Course:  No notes on file    Interval History:  Patient is in intensive care unit for management of septic shock.  Patient is doing well.  Blood pressure has improved.  Patient remains afebrile.  No leukocytosis is noted.  Patient exhibits intermittent confusion.  Patient is scheduled for hemodialysis therapy today.  Patient denies any chest pain or shortness of breath.    Review of Systems   Unable to perform ROS: Acuity of condition (Patient with baseline confusion - unable to provide ROS)     Objective:     Vital Signs (Most Recent):  Temp: 98.4 °F (36.9 °C) (08/23/20 0322)  Pulse: (!) 55 (08/23/20 0700)  Resp: (!) 22 (08/23/20 0700)  BP: 109/60 (08/23/20 0700)  SpO2: 98 % (08/23/20 0700) Vital Signs (24h Range):  Temp:  [97.4 °F (36.3 °C)-98.5 °F (36.9 °C)] 98.4 °F (36.9 °C)  Pulse:  [] 55  Resp:  [17-48] 22  SpO2:  [90 %-100 %] 98 %  BP: ()/(54-74) 109/60     Weight: 82.5 kg (181 lb 14.1 oz)  Body mass index is 24 kg/m².    Intake/Output Summary (Last 24 hours) at 8/23/2020 0950  Last data filed at 8/23/2020 0800  Gross per 24 hour   Intake 1015 ml   Output 0 ml   Net 1015 ml      Physical Exam  Vitals signs and nursing note reviewed.   Constitutional:       General: He is not in acute distress.     Appearance: He is well-developed. He is not diaphoretic.   HENT:      Head: Normocephalic and atraumatic.   Eyes:      General:         Right eye: No discharge.         Left eye: No discharge.      Pupils:  Pupils are equal, round, and reactive to light.      Comments: Eyeglasses in use   Neck:      Musculoskeletal: Normal range of motion.      Vascular: No JVD.   Cardiovascular:      Rate and Rhythm: Normal rate. Rhythm irregular.      Heart sounds: Murmur present.      Comments: Irregular, regular.  Consistent with atrial fibrillation.  Murmur present.  Pulmonary:      Effort: Pulmonary effort is normal. No respiratory distress.      Breath sounds: Normal breath sounds. No wheezing or rales.      Comments: Patient on room air during my initial interview and physical exam, patient in no acute respiratory distress.  Chest:      Chest wall: No tenderness.   Abdominal:      General: Bowel sounds are normal. There is no distension.      Palpations: Abdomen is soft.      Tenderness: There is no abdominal tenderness. There is no guarding or rebound.      Comments: Peg tube in place.   Genitourinary:     Comments: Exam Deferred     Musculoskeletal: Normal range of motion.         General: No tenderness or deformity.   Skin:     General: Skin is warm and dry.      Capillary Refill: Capillary refill takes 2 to 3 seconds.      Findings: Bruising present. No erythema or rash.      Comments: Left upper extremity dialysis graft present, not accessed.  Positive thrill positive bruit.   Neurological:      Mental Status: He is alert.      Comments: Patient is awake, alert.  Patient oriented to self.  Patient disoriented to location, situation, and time.  Patient follows commands.  No evidence of facial droop.  Spontaneous movement noted to all 4 extremities.   Psychiatric:      Comments: Psychiatric assessment limited as patient has altered mental status, reportedly at baseline.         Significant Labs:   CBC:   Recent Labs   Lab 08/21/20 2326 08/23/20  0339   WBC 13.65* 7.55   HGB 9.6* 7.8*   HCT 28.5* 24.7*    134*     CMP:   Recent Labs   Lab 08/21/20 2325 08/22/20  1327 08/23/20  0339     --  134*   K 4.7  --  4.2      --  104   CO2 25  --  19*   GLU 87  --  107   BUN 18  --  31*   CREATININE 4.4* 5.1* 6.1*   CALCIUM 8.9  --  8.3*   PROT 7.1  --  5.9*   ALBUMIN 3.5  --  2.9*   BILITOT 0.4  --  0.4   ALKPHOS 92  --  67   AST 9*  --  6*   ALT 9*  --  6*   ANIONGAP 9  --  11   EGFRNONAA 12* 10* 8*     Microbiology Results (last 7 days)     Procedure Component Value Units Date/Time    Blood culture x two cultures. Draw prior to antibiotics. [[id number]] Collected: 08/21/20 2325    Order Status: Completed Specimen: Blood Updated: 08/22/20 1715     Blood Culture, Routine No Growth to date    Narrative:      Aerobic and anaerobic    Blood culture x two cultures. Draw prior to antibiotics. [[id number]] Collected: 08/21/20 2325    Order Status: Completed Specimen: Blood from Antecubital, Right Hand Updated: 08/22/20 1715     Blood Culture, Routine No Growth to date    Narrative:      Aerobic and anaerobic    Influenza A & B by Molecular [[id number]] Collected: 08/22/20 0114    Order Status: Completed Specimen: Nasopharyngeal Swab Updated: 08/22/20 0136     Influenza A, Molecular Negative     Influenza B, Molecular Negative     Flu A & B Source Nasal swab        Significant Imaging:   CXR: No evidence of acute chest disease since the prior exam.      Assessment/Plan:      * Septic shock  This patient does have evidence of infective focus  My overall impression is septic shock.  Antibiotics given-   IV Vancomycin and IV Zosyn       Latest lactate reviewed, they are-  Recent Labs   Lab 08/21/20 2325 08/22/20 0147   LACTATE 2.3* 2.1       Organ dysfunction indicated by Encephalopathy and Acute respiratory failure  Source- Unknown - Chest xray WNL, Urine pending, Blood cultures obtained and pending.  Source control Achieved by- IV Vancomycin and IV Zosyn    Patient given 1L NS in ER. Admit to ICU for further monitoring.      Goals of care, counseling/discussion  Advance Care Planning     Living Will  During this visit, I engaged the  healthcare power of   (daughter Miss Castaneda) in the advance care planning process.  The patient and I reviewed the role for advance directives and their purpose in directing future healthcare if the patient's unable to speak for him/herself.  At this point in time, the patient does have full decision-making capacity.  We discussed different extreme health states that he could experience, and reviewed what kind of medical care he would want in those situations.  The healthcare power of   (daughter Miss Castaneda)communicated that if he were comatose and had little chance of a meaningful recovery, he would not want machines/life-sustaining treatments used.  I spent a total of 20 minutes engaging the patient in this advance care planning discussion.          Seizure  Chronic, controlled.  Continue home Keppra.  Seizure precautions.      COPD with asthma  Chronic, controlled.  Will utilize DuoNebs as needed.  Patient denies respiratory complaints upon admission to the hospital.  Continuous telemetry and SpO2 monitoring.  Utilize supplemental oxygen to maintain SpO2 greater than 90%.      Atrial fibrillation  Chronic, controlled. Continue home Eliquis. Continuous telemetry monitoring. EKG obtained in ER and reviewed.        Cerebrovascular accident (CVA) due to occlusion of left posterior cerebral artery  Noted, with residual right-sided weakness.  Fall precautions, aspiration precautions, neuro checks. Patient has a PEG tube present, unable to determine home PO diet regimen, will order swallow evaluation for further recommendations.        ESRD (end stage renal disease) on dialysis  Creatine stable for now. BMP reviewed- noted Estimated Creatinine Clearance: 14.9 mL/min (A) (based on SCr of 4.4 mg/dL (H)). according to latest data. Monitor UOP and serial BMP and adjust therapy as needed. Renally dose meds.            Long term (current) use of anticoagulants  Noted, continue home Eliquis for  anticoagulation in the setting of atrial fibrillation.  Bleeding precautions.      Anemia due to chronic kidney disease, on chronic dialysis  Monitor and transfuse if patient becomes hemodynamically unstable or H/H < 7/21  Daily CBC.       Benign prostatic hyperplasia without lower urinary tract symptoms  Chronic, controlled.  Will continue home medication.      CAD (coronary artery disease), 2V CABG 2007  Chronic, controlled.  Will continue home medication.  Continuous telemetry monitoring.      Hyperlipidemia  Chronic, controlled. Will continue home medication.    Lab Results   Component Value Date    CHOL 89 (L) 07/26/2018    CHOL 102 (L) 04/25/2018    CHOL 116 (L) 03/10/2017     Lab Results   Component Value Date    HDL 29 (L) 07/26/2018    HDL 31 (L) 04/25/2018    HDL 29 (L) 03/10/2017     Lab Results   Component Value Date    LDLCALC 46.4 (L) 07/26/2018    LDLCALC 47.6 (L) 04/25/2018    LDLCALC 62.8 (L) 03/10/2017     Lab Results   Component Value Date    TRIG 68 07/26/2018    TRIG 117 04/25/2018    TRIG 121 03/10/2017     Lab Results   Component Value Date    CHOLHDL 32.6 07/26/2018    CHOLHDL 30.4 04/25/2018    CHOLHDL 25.0 03/10/2017               Essential hypertension  Chronic, controlled.  Latest blood pressure and vitals reviewed-   Temp:  [97.6 °F (36.4 °C)-102.6 °F (39.2 °C)]   Pulse:  [67-95]   Resp:  [18]   BP: ()/(50-74)   SpO2:  [93 %-98 %] .   Home meds for hypertension were reviewed and noted below. Hospital anti-hypertensive changes were made as shown below.  Hypertension Medications             carvediloL (COREG) 12.5 MG tablet Take 6.5 tablets (81.25 mg total) by mouth 2 (two) times daily.        Hold Coreg upon admission in the setting of acute hypotension secondary to septic shock.      Discussed with patient's daughter, answered all the questions.  Patient would require tele-sitter. Transfer to medicine floor.   VTE Risk Mitigation (From admission, onward)         Ordered      apixaban tablet 2.5 mg  2 times daily      08/22/20 0825     IP VTE HIGH RISK PATIENT  Once      08/22/20 0825     Place sequential compression device  Until discontinued      08/22/20 0825     Place LOYD hose  Until discontinued      08/22/20 0825                Discharge Planning   MARKELL:      Code Status: DNR   Is the patient medically ready for discharge?:     Reason for patient still in hospital (select all that apply): Patient trending condition               Critical care time spent on the evaluation and treatment of severe organ dysfunction, review of pertinent labs and imaging studies, discussions with consulting providers and discussions with patient/family: 34 minutes.      Charissa Olivas MD  Department of Hospital Medicine   [facility]

## 2020-08-23 NOTE — RESPIRATORY THERAPY
08/23/20 0748   Patient Assessment/Suction   Level of Consciousness (AVPU) alert   All Lung Fields Breath Sounds diminished;clear   PRE-TX-O2   O2 Device (Oxygen Therapy) room air   Pulse Oximetry Type Continuous   $ Pulse Oximetry - Multiple Charge Pulse Oximetry - Multiple   Aerosol Therapy   $ Aerosol Therapy Charges PRN treatment not required

## 2020-08-23 NOTE — PLAN OF CARE
Pt alert confused to surroundings and events. Yells out frequently. Afebrile this shift. BP stable Tube feeds in place and up to goal. Bath and linen change done. Does not want SCD on. Small amt urine in brief. Pulled condom cath off and left off. Safety measures in place.

## 2020-08-23 NOTE — NURSING
Patient incontinent in brief. Condom cath placed in attempt to collect U/A. Notified daughter, Crow, that condom cath was placed and she was okay with this (conversation this morning that family did not want an indwelling catheter).

## 2020-08-23 NOTE — PLAN OF CARE
Transferred patient from ICU room 515 to PCU room 216, family requested virtual sitter, Mily, unable to have patients home sitter tonight. Patient calm and cooperative, no distress noted. Vital signs stable. Tube feedings at goal via PEG tube. Report given to Rere.

## 2020-08-24 NOTE — PLAN OF CARE
The pt is discharging home with resumption of [facility] and is clear for discharge from case management. Viktoria Cm, OSMANI     08/24/20 1136   Final Note   Assessment Type Final Discharge Note   Anticipated Discharge Disposition Home-Health

## 2020-08-24 NOTE — NURSING
Discharged patient per MD order. Educated patient and caregiver about medications and when to make his follow up appointments. Removed patients IV and tele. Was taken by Gilbert and myself via wheelchair to his caregivers vehicle outside.

## 2020-08-24 NOTE — RESPIRATORY THERAPY
08/23/20 1959   Patient Assessment/Suction   Level of Consciousness (AVPU) alert   Respiratory Effort Unlabored   Expansion/Accessory Muscles/Retractions no use of accessory muscles   All Lung Fields Breath Sounds clear;diminished   Rhythm/Pattern, Respiratory unlabored   Cough Frequency no cough   PRE-TX-O2   O2 Device (Oxygen Therapy) room air   SpO2 99 %   Pulse Oximetry Type Intermittent   $ Pulse Oximetry - Multiple Charge Pulse Oximetry - Multiple   Aerosol Therapy   $ Aerosol Therapy Charges PRN treatment not required
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
## 2020-08-24 NOTE — PLAN OF CARE
Patient disoriented to time and situation.  Home caregiver, Maya, at bedside overnight; Avasys also in room.  VSS, afebrile. Tele # [id number] monitored; afib controlled.  Pt confused and refusing tele monitor this AM.  PEG tube intact; Novasource Renal @ 50 cc/hr (goal). IV ABX infused. Brief changed as needed.  Assistance provided with repositioning.  No new complaints noted at this time.  Needs addressed, safety maintained.  Monitoring.

## 2020-08-24 NOTE — PLAN OF CARE
I sent the pts HH orders and HH packet to [facility] via [facility]. The pt has [facility] for primary care. CM following. Viktoria Cm LCSW     08/24/20 1020   Post-Acute Status   Post-Acute Authorization Home Health   Home Health Status Referrals Sent   Patient choice form signed by patient/caregiver List with quality metrics by geographic area provided

## 2020-08-24 NOTE — PLAN OF CARE
08/24/20 1133   Medicare Message   Important Message from Medicare regarding Discharge Appeal Rights Explained to patient/caregiver;Signed/date by patient/caregiver;Given to patient/caregiver   Date IMM was signed 08/24/20   Time IMM was signed 0472

## 2020-08-24 NOTE — DISCHARGE INSTRUCTIONS
Continue routine HD as before.  No feeding as per recommendations by Speech therapist, observe aspiration precautions.

## 2020-08-24 NOTE — RESPIRATORY THERAPY
08/24/20 0649   Patient Assessment/Suction   Level of Consciousness (AVPU) alert   PRE-TX-O2   O2 Device (Oxygen Therapy) room air   SpO2 100 %   Pulse Oximetry Type Intermittent   $ Pulse Oximetry - Single Charge Pulse Oximetry - Single   Aerosol Therapy   $ Aerosol Therapy Charges PRN treatment not required

## 2020-08-24 NOTE — PLAN OF CARE
Per Diana at [facility] [phone]- they received the pts info and will resume services. Viktoria Cm, OSMANI     08/24/20 1135   Post-Acute Status   Post-Acute Authorization Home Health   Home Health Status Set-up Complete

## 2020-08-24 NOTE — CONSULTS
" INPATIENT NEPHROLOGY CONSULT   [facility] NEPHROLOGY    Micheal Hunt  08/24/2020    Reason for consultation:    esrd    Chief Complaint:   Chief Complaint   Patient presents with    Fever     103 at home today; dialysis today          History of Present Illness:    As per H and P    Micheal Hunt is an 79-year-old male with past medical history of end-stage renal disease on HD, hypertension, atrial fibrillation on home Eliquis, GERD, BPH, and previous CVA who presents to the emergency room tonight with reports of fever.  Information for HPI obtained from ER physician note as patient is a poor historian.  Patient with obvious disorientation and confusion upon my initial interview and physical exam, per ER physician this is reported as patient's baseline.  Per ER physician note, Micheal Hunt is a 79 y.o. male with PMHx of CAD, HTN, HLD, A-fib, and ESRD who presents to the ED via EMS for evaluation of fever and "feeling generally unwell". Patient had dialysis this morning. This afternoon, it was noted by family that patient had a fever of 103 at home. He denies chest pain, SOB, abdominal pain, N/V/D, recent cough, congestion, or other new symptoms. Patient has a history of stroke and chronic right sided weakness secondary to the stroke. He is still able to produce urine, denies pain with urination. Patient has no other medical concerns or complaints at this moment. PSHx includes CABG and EGD."  Patient not accompanied by any visitors during my initial interview and physical exam.  In the emergency room patient noted to meet septic shock criteria.  Patient was given 1 L normal saline in the emergency room and initiated on IV vancomycin and Zosyn.  Chest x-ray not revealing any acute intrathoracic process.  Patient urine unable to be obtained, patient is end-stage renal disease patient.  Patient admitted to ICU on 08/22/2020 approximately 2:30 a.m..    8/22 No nausea, chest pain, sob, fever, " urinary or bowel complaint, new neurologic symptoms, new joint pain,  8/23  NO CHEST PAIN OR SOB, no complaints whatsoever   8/24  Seen on dialysis.  afebrile    Plan of Care:       Assessment:    esrd  --continue dialysis per routine  --fluid restrict  --renal dose medication per routine  --continue outpt medication  --continue binders with meals    Anemia  --erythropoiesis stimulating agent with renal replacement therapy    Fever--afebrile this afternoon  --empiric abx  --cxr neg  --f/u cultures--negative    A-fib-rate controlled    H/o COPD  --compensated       Thank you for allowing us to participate in this patient's care. We will continue to follow.    Vital Signs:  Temp Readings from Last 3 Encounters:   08/24/20 97.4 °F (36.3 °C)   08/19/20 96.5 °F (35.8 °C) (Oral)   08/03/20 98.2 °F (36.8 °C) (Skin)       Pulse Readings from Last 3 Encounters:   08/24/20 61   08/19/20 78   08/03/20 62       BP Readings from Last 3 Encounters:   08/24/20 134/68   08/19/20 120/70   08/03/20 (!) 150/66       Weight:  Wt Readings from Last 3 Encounters:   08/23/20 82.5 kg (181 lb 14.1 oz)   08/18/20 78.5 kg (173 lb)   08/03/20 81.8 kg (180 lb 5.4 oz)       Past Medical & Surgical History:  Past Medical History:   Diagnosis Date    NICK (acute kidney injury) 7/29/2016    Allergy     Anemia, mild 12/15/2014    Arthritis     Gout    Atrial fibrillation 03/2019    Benign essential HTN 3/27/2012    BMI 29.0-29.9,adult 5/10/2018    BPH (benign prostatic hyperplasia)     BPH (benign prostatic hyperplasia)     CAD (coronary artery disease) 2006    Carotid artery occlusion     60-70% FROYLAN, LICA < 50%    Chronic kidney disease     due to ibuprofen    Colon polyp     CRF (chronic renal failure), stage 5     Diabetes mellitus     Diverticulosis     ESRD (end stage renal disease) on dialysis     Gastritis     GERD (gastroesophageal reflux disease)     Gout     History of colon polyps 5/3/2018    HTN (hypertension)  3/27/2012    Hyperlipidemia     Hyperlipidemia     LLL pneumonia 6/14/2018    LVH (left ventricular hypertrophy)     Mesenteric ischemia     Murmur, cardiac 3/27/2012    GRICELDA (obstructive sleep apnea)     DOES NOT USE A MACHINE    Sinus problem     Stroke 03/2019    acute left PCA    Syncope and collapse        Past Surgical History:   Procedure Laterality Date    APPENDECTOMY      CARDIAC CATHETERIZATION  2012    LIMA to LAD patent, SVG to RCA    COLONOSCOPY  2011    COLONOSCOPY N/A 5/3/2018    Procedure: COLONOSCOPY;  Surgeon: Messi Harris MD;  Location: [facility] ENDO;  Service: Endoscopy;  Laterality: N/A;    COLONOSCOPY N/A 8/18/2020    Procedure: COLONOSCOPY;  Surgeon: Messi Harris MD;  Location: [facility] ENDO;  Service: Endoscopy;  Laterality: N/A;    CORONARY ARTERY BYPASS GRAFT  4/2007    x 2 [place]    ESOPHAGOGASTRODUODENOSCOPY N/A 8/17/2020    Procedure: EGD (ESOPHAGOGASTRODUODENOSCOPY);  Surgeon: Eben Soto MD;  Location: [facility] ENDO;  Service: Endoscopy;  Laterality: N/A;    JOINT REPLACEMENT      left knee total replacement  X 3    mid leftt finger      from a cactuss    MOLE REMOVAL  2016    rotative cuff      no rotative cuffs on bilat shoulders has pins     SHOULDER SURGERY      shoulder surgery bilat  RIGHT X 4; LEFT X 3       Past Social History:  Social History     Socioeconomic History    Marital status:      Spouse name: Not on file    Number of children: Not on file    Years of education: Not on file    Highest education level: Not on file   Occupational History    Not on file   Social Needs    Financial resource strain: Somewhat hard    Food insecurity     Worry: Often true     Inability: Often true    Transportation needs     Medical: Yes     Non-medical: Yes   Tobacco Use    Smoking status: Never Smoker    Smokeless tobacco: Never Used   Substance and Sexual Activity    Alcohol use: No     Frequency: Never     Drinks per session: Patient  refused     Binge frequency: Patient refused    Drug use: No    Sexual activity: Not on file   Lifestyle    Physical activity     Days per week: Not on file     Minutes per session: Not on file    Stress: Not on file   Relationships    Social connections     Talks on phone: More than three times a week     Gets together: Three times a week     Attends Judaism service: Not on file     Active member of club or organization: Patient refused     Attends meetings of clubs or organizations: Patient refused     Relationship status:    Other Topics Concern    Not on file   Social History Narrative    [place]; daughter nearby       Medications:  No current facility-administered medications on file prior to encounter.      Current Outpatient Medications on File Prior to Encounter   Medication Sig Dispense Refill    albuterol (PROVENTIL/VENTOLIN HFA) 90 mcg/actuation inhaler 2 puffs every 4 hours as needed for cough, wheeze, or shortness of breath (Patient not taking: Reported on 8/20/2020)      allopurinoL (ZYLOPRIM) 100 MG tablet 100 mg by FEEDING TUBE route once daily.      apixaban (ELIQUIS) 2.5 mg Tab 2.5 mg by FEEDING TUBE route 2 (two) times daily.      aspirin (ECOTRIN) 81 MG EC tablet 81 mg by FEEDING TUBE route once daily.      atorvastatin (LIPITOR) 40 MG tablet 1 tablet (40 mg total) by Per G Tube route once daily. 90 tablet 4    B complex-vitamin C-folic acid 800 mcg Chew 0.8 mg by Per G Tube route once daily.      carvediloL (COREG) 12.5 MG tablet Take 6.5 tablets (81.25 mg total) by mouth 2 (two) times daily. (Patient taking differently: 78.125 mg by Per G Tube route 2 (two) times daily. ) 90 tablet 4    finasteride (PROSCAR) 5 mg tablet Take 1 tablet (5 mg total) by mouth once daily. (Patient taking differently: 5 mg by Per G Tube route once daily. ) 90 tablet 3    fluticasone (FLONASE) 50 mcg/actuation nasal spray 2 sprays (100 mcg total) by Each Nare route once daily. 3  Bottle 3    levETIRAcetam (KEPPRA) 100 mg/mL Soln 7.5 ml by PEG daily 120 mL 5    melatonin 10 mg Tab Take by mouth nightly.      mupirocin (BACTROBAN) 2 % ointment Apply topically 3 (three) times daily. 1 Tube 2    nut.tx.imp.renal fxn,lac-reduc (NEPRO CARB STEADY ORAL) 240 mLs by FEEDING TUBE route 2 (two) times daily.      nut.tx.imp.renal fxn,lac-reduc (NEPRO CARB STEADY ORAL) 1,000 mLs by FEEDING TUBE route 2 (two) times daily.      pantoprazole (PROTONIX) 40 MG tablet Take 1 tablet (40 mg total) by mouth once daily. (Patient not taking: Reported on 6/30/2020) 30 tablet 11    polyethylene glycol (GLYCOLAX) 17 gram PwPk Take 17 g by mouth.      pramoxine-hydrocortisone cream Apply topically 3 (three) times daily. 57 g 3    senna-docusate 8.6-50 mg (PERICOLACE) 8.6-50 mg per tablet Take 1 tablet by mouth.      tamsulosin (FLOMAX) 0.4 mg Cap Take 1 capsule (0.4 mg total) by mouth once daily. 90 capsule 2    tiotropium bromide (SPIRIVA RESPIMAT) 1.25 mcg/actuation Mist Inhale 2.5 mcg into the lungs once daily. Controller 4 g 5    vit b cmplx 3-fa-vit c-biotin 1- mg-mg-mcg (NEPHRO-EMMETT RX) 1- mg-mg-mcg Tab Take 1 tablet by mouth once daily. 90 tablet 3     Scheduled Meds:   sodium chloride 0.9%   Intravenous Once    allopurinoL  100 mg Oral Daily    apixaban  2.5 mg Oral BID    aspirin  81 mg Oral Daily    atorvastatin  40 mg Per G Tube Daily    epoetin kitty-ebpx (RETACRIT) injection  10,000 Units Subcutaneous Every Mon, Wed, Fri    famotidine (PF)  20 mg Intravenous Daily    finasteride  5 mg Per G Tube Daily    fluticasone propionate  2 spray Each Nostril Daily    levetiracetam oral soln  7.5 mL Per G Tube Daily    piperacillin-tazobactam (ZOSYN) IVPB  4.5 g Intravenous Q12H    tamsulosin  1 capsule Oral Daily    vitamin renal formula (B-complex-vitamin c-folic acid)  1 capsule Oral Daily     Continuous Infusions:  PRN Meds:.sodium chloride 0.9%, acetaminophen,  "albuterol-ipratropium, ondansetron, sodium chloride 0.9%, Pharmacy to dose Vancomycin consult **AND** vancomycin - pharmacy to dose    Allergies:  Ace inhibitors, Angiotensin ii, Arb-angiotensin receptor antagonist, Eplerenone, and Sulfa (sulfonamide antibiotics)    Past Family History:  Reviewed; refer to Hospitalist Admission Note    Review of Systems:  Review of Systems - All 14 systems reviewed and negative, except as noted in HPI    Physical Exam:    /68   Pulse 61   Temp 97.4 °F (36.3 °C)   Resp 18   Ht 6' 1" (1.854 m)   Wt 82.5 kg (181 lb 14.1 oz)   SpO2 100%   BMI 24.00 kg/m²     General Appearance:    Alert, cooperative, no distress, appears stated age   Head:    Normocephalic, without obvious abnormality, atraumatic   Eyes:    PER, conjunctiva/corneas clear, EOM's intact in both eyes        Throat:   Lips, mucosa, and tongue normal; teeth and gums normal   Back:     Symmetric, no curvature, ROM normal, no CVA tenderness   Lungs:     Clear to auscultation bilaterally, respirations unlabored   Chest wall:    No tenderness or deformity   Heart:    Regular rate and rhythm, S1 and S2 normal, no murmur, rub   or gallop   Abdomen:     Soft, non-tender, bowel sounds active all four quadrants,     no masses, no organomegaly   Extremities:   Extremities normal, atraumatic, no cyanosis or edema   Pulses:   2+ and symmetric all extremities   MSK:   No joint or muscle swelling, tenderness or deformity   Skin:   Skin color, texture, turgor normal, no rashes or lesions   Neurologic:   .  No flap     Results:  Lab Results   Component Value Date     08/24/2020    K 4.0 08/24/2020     08/24/2020    CO2 23 08/24/2020    BUN 46 (H) 08/24/2020    CREATININE 7.4 (H) 08/24/2020    CALCIUM 8.2 (L) 08/24/2020    ANIONGAP 12 08/24/2020    ESTGFRAFRICA 7 (A) 08/24/2020    EGFRNONAA 6 (A) 08/24/2020       Lab Results   Component Value Date    CALCIUM 8.2 (L) 08/24/2020    PHOS 4.1 08/24/2020       Recent Labs "   Lab 08/24/20  0535   WBC 7.30   RBC 2.30*   HGB 7.5*   HCT 23.5*   *   *   MCH 32.6*   MCHC 31.9*       I have personally reviewed pertinent radiological imaging and reports.

## 2020-08-24 NOTE — PROGRESS NOTES
Pharmacokinetic Assessment Follow Up: IV Vancomycin    Vancomycin serum concentration assessment(s):    The random level was drawn correctly and can be used to guide therapy at this time. The measurement is within the desired definitive target range of 15 to 20 mcg/mL.    Vancomycin Regimen Plan:    Patient's random level was 15.3 mcg/mL and within the therapeutic range of 15-20 mcg/mL.  Patient will be given vancomycin 500 mg after dialysis today.   A random level will be drawn on 8/26/20 with AM labs .  Continue target goal of 15-20 mcg/mL.    Will continue daily monitoring of patient's renal function and will order random vancomycin level if necessary to assess elimination.      Drug levels (last 3 results):  Recent Labs   Lab Result Units 08/22/20  2343 08/24/20  0535   Vancomycin, Random ug/mL 13.7 15.3       Pharmacy will continue to follow and monitor vancomycin.    Please contact pharmacy at extension [phone] for questions regarding this assessment.    Thank you for the consult,   Joel Dobbins       Patient brief summary:  Micheal Hunt is a 79 y.o. male initiated on antimicrobial therapy with IV Vancomycin for treatment of bacteremia    The patient is being dosed post HD based on pre HD levels.     Drug Allergies:   Review of patient's allergies indicates:   Allergen Reactions    Ace inhibitors Other (See Comments)     Cough    Angiotensin ii     Arb-angiotensin receptor antagonist Itching    Eplerenone Other (See Comments)     Marked bradycardia, 40, tiredness and weakness      Sulfa (sulfonamide antibiotics) Itching and Swelling     Patient says this was 10 years ago and doesn't remember what happened       Actual Body Weight:   82.5 kg (181 lb 14.1 oz)    Renal Function:   Estimated Creatinine Clearance: 9 mL/min (A) (based on SCr of 7.4 mg/dL (H)).,     Dialysis Method (if applicable):  intermittent HD (MWF)    CBC (last 72 hours):  Recent Labs   Lab Result Units 08/21/20  3386 08/23/20  1716  08/24/20  0535   WBC K/uL 13.65* 7.55 7.30   Hemoglobin g/dL 9.6* 7.8* 7.5*   Hematocrit % 28.5* 24.7* 23.5*   Platelets K/uL 172 134* 143*   Gran% % 90.3* 69.8 68.8   Lymph% % 4.2* 18.4 17.8*   Mono% % 3.9* 6.6 7.1   Eosinophil% % 1.0 4.6 5.9   Basophil% % 0.2 0.3 0.1   Differential Method  Automated Automated Automated       Metabolic Panel (last 72 hours):  Recent Labs   Lab Result Units 08/21/20  2325 08/22/20  1327 08/23/20  0339 08/24/20  0535   Sodium mmol/L 137  --  134* 138   Potassium mmol/L 4.7  --  4.2 4.0   Chloride mmol/L 103  --  104 103   CO2 mmol/L 25  --  19* 23   Glucose mg/dL 87  --  107 123*   BUN, Bld mg/dL 18  --  31* 46*   Creatinine mg/dL 4.4* 5.1* 6.1* 7.4*   Albumin g/dL 3.5  --  2.9* 2.8*   Total Bilirubin mg/dL 0.4  --  0.4 0.3   Alkaline Phosphatase U/L 92  --  67 65   AST U/L 9*  --  6* 6*   ALT U/L 9*  --  6* 7*   Magnesium mg/dL 1.7  --  1.9 2.0   Phosphorus mg/dL  --   --   --  4.1       Vancomycin Administrations:  vancomycin given in the last 96 hours                     vancomycin 500 mg/100 mL IVPB 500 mg (mg) 500 mg New Bag 08/23/20 0840    vancomycin 1.25 g in dextrose 5% 250 mL IVPB (ready to mix) (mg) 1,250 mg New Bag 08/22/20 0044                    Microbiologic Results:  Microbiology Results (last 7 days)       Procedure Component Value Units Date/Time    Blood culture x two cultures. Draw prior to antibiotics. [[id number]] Collected: 08/21/20 2325    Order Status: Completed Specimen: Blood Updated: 08/23/20 1022     Blood Culture, Routine No Growth to date      No Growth to date    Narrative:      Aerobic and anaerobic    Blood culture x two cultures. Draw prior to antibiotics. [[id number]] Collected: 08/21/20 4078    Order Status: Completed Specimen: Blood from Antecubital, Right Hand Updated: 08/23/20 1022     Blood Culture, Routine No Growth to date      No Growth to date    Narrative:      Aerobic and anaerobic    Influenza A & B by Molecular [[id number]] Collected:  08/22/20 0114    Order Status: Completed Specimen: Nasopharyngeal Swab Updated: 08/22/20 0136     Influenza A, Molecular Negative     Influenza B, Molecular Negative     Flu A & B Source Nasal swab

## 2020-08-25 NOTE — PATIENT INSTRUCTIONS
Hospital follow up appointment scheduled. Patient's daughter (Tonya) agreed to appointment date and time. Orders for follow up labs ordered by Dr Mayra Olivas faxed to [facility] [employer] [facility] at [phone] for collection on 8-.

## 2020-08-26 NOTE — PROGRESS NOTES
C3 nurse attempted to contact patient. No answer. The following message was left for the patient to return the call:  Good afternoon  I am a nurse calling on behalf of [facility] from the [facility].  This is a Transitional Care Call for Micheal Hunt. When you have a moment please contact us at [phone] or [phone] Monday through Friday, between the hours of 8 am to 4 pm. We look forward to speaking with you. On behalf of [facility] have a nice day.    The patient has a scheduled HOSFU appointment with Issa Huang MD on 9/1 @ 1100. Message sent to Physician staff.

## 2020-09-01 NOTE — PROGRESS NOTES
Patient ID: Micheal Hunt is a 79 y.o. male.    Chief Complaint:   Hospital Follow Up    HPI   Mr. Hunt presents to clinic today with his caregiver for a hospital follow up after treatment of sepsis. Patient was discharged on Augmentin PO however due to GI side effects, he took 3 of the 5 days prescribed. No fever or rectal bleeding since discharge from hospital. Patient has not followed up with GI as recommended at discharge. He eats meals by mouth and takes medications via PEG tube. He also gets nightly Nepro bolus via PEG tube and tolerates well. No acute issues reported at this time. All questions and concerns addressed.  Patient is new to me. Reviewed past medical and social history.    Past Medical History:   Diagnosis Date    NICK (acute kidney injury) 7/29/2016    Allergy     Anemia, mild 12/15/2014    Arthritis     Gout    Atrial fibrillation 03/2019    Benign essential HTN 3/27/2012    BMI 29.0-29.9,adult 5/10/2018    BPH (benign prostatic hyperplasia)     BPH (benign prostatic hyperplasia)     CAD (coronary artery disease) 2006    Carotid artery occlusion     60-70% FROYLAN, LICA < 50%    Chronic kidney disease     due to ibuprofen    Colon polyp     CRF (chronic renal failure), stage 5     Diabetes mellitus     Diverticulosis     ESRD (end stage renal disease) on dialysis     Gastritis     GERD (gastroesophageal reflux disease)     Gout     History of colon polyps 5/3/2018    HTN (hypertension) 3/27/2012    Hyperlipidemia     Hyperlipidemia     LLL pneumonia 6/14/2018    LVH (left ventricular hypertrophy)     Mesenteric ischemia     Murmur, cardiac 3/27/2012    GRICELDA (obstructive sleep apnea)     DOES NOT USE A MACHINE    Sinus problem     Stroke 03/2019    acute left PCA    Syncope and collapse      Past Surgical History:   Procedure Laterality Date    APPENDECTOMY      CARDIAC CATHETERIZATION  2012    LIMA to LAD patent, SVG to RCA    COLONOSCOPY  2011     COLONOSCOPY N/A 5/3/2018    Procedure: COLONOSCOPY;  Surgeon: Messi Harris MD;  Location: [facility] ENDO;  Service: Endoscopy;  Laterality: N/A;    COLONOSCOPY N/A 8/18/2020    Procedure: COLONOSCOPY;  Surgeon: Messi Harris MD;  Location: [facility] ENDO;  Service: Endoscopy;  Laterality: N/A;    CORONARY ARTERY BYPASS GRAFT  4/2007    x 2 [place]    ESOPHAGOGASTRODUODENOSCOPY N/A 8/17/2020    Procedure: EGD (ESOPHAGOGASTRODUODENOSCOPY);  Surgeon: Eben Soto MD;  Location: [facility] ENDO;  Service: Endoscopy;  Laterality: N/A;    JOINT REPLACEMENT      left knee total replacement  X 3    mid leftt finger      from a cactuss    MOLE REMOVAL  2016    rotative cuff      no rotative cuffs on bilat shoulders has pins     SHOULDER SURGERY      shoulder surgery bilat  RIGHT X 4; LEFT X 3     Current Outpatient Medications on File Prior to Visit   Medication Sig Dispense Refill    albuterol (PROVENTIL/VENTOLIN HFA) 90 mcg/actuation inhaler 2 puffs every 4 hours as needed for cough, wheeze, or shortness of breath      allopurinoL (ZYLOPRIM) 100 MG tablet 100 mg by FEEDING TUBE route once daily.      apixaban (ELIQUIS) 2.5 mg Tab 2.5 mg by FEEDING TUBE route 2 (two) times daily.      aspirin (ECOTRIN) 81 MG EC tablet 81 mg by FEEDING TUBE route once daily.      atorvastatin (LIPITOR) 40 MG tablet 1 tablet (40 mg total) by Per G Tube route once daily. 90 tablet 4    B complex-vitamin C-folic acid 800 mcg Chew 0.8 mg by Per G Tube route once daily.      carvediloL (COREG) 12.5 MG tablet Take 6.5 tablets (81.25 mg total) by mouth 2 (two) times daily. (Patient taking differently: 78.125 mg by Per G Tube route 2 (two) times daily. ) 90 tablet 4    finasteride (PROSCAR) 5 mg tablet Take 1 tablet (5 mg total) by mouth once daily. (Patient taking differently: 5 mg by Per G Tube route once daily. ) 90 tablet 3    fluticasone (FLONASE) 50 mcg/actuation nasal spray 2 sprays (100 mcg total) by Each Nare route once  daily. 3 Bottle 3    levETIRAcetam (KEPPRA) 100 mg/mL Soln 7.5 ml by PEG daily 120 mL 5    melatonin 10 mg Tab Take by mouth nightly.      nut.tx.imp.renal fxn,lac-reduc (NEPRO CARB STEADY ORAL) 240 mLs by FEEDING TUBE route 2 (two) times daily.      nut.tx.imp.renal fxn,lac-reduc (NEPRO CARB STEADY ORAL) 1,000 mLs by FEEDING TUBE route 2 (two) times daily.      pantoprazole (PROTONIX) 40 MG tablet Take 1 tablet (40 mg total) by mouth once daily. 30 tablet 11    polyethylene glycol (GLYCOLAX) 17 gram PwPk Take 17 g by mouth.      pramoxine-hydrocortisone cream Apply topically 3 (three) times daily. 57 g 3    senna-docusate 8.6-50 mg (PERICOLACE) 8.6-50 mg per tablet Take 1 tablet by mouth.      tamsulosin (FLOMAX) 0.4 mg Cap Take 1 capsule (0.4 mg total) by mouth once daily. 90 capsule 2    tiotropium bromide (SPIRIVA RESPIMAT) 1.25 mcg/actuation Mist Inhale 2.5 mcg into the lungs once daily. Controller 4 g 5    vit b cmplx 3-fa-vit c-biotin 1- mg-mg-mcg (NEPHRO-EMMETT RX) 1- mg-mg-mcg Tab Take 1 tablet by mouth once daily. 90 tablet 3    [DISCONTINUED] mupirocin (BACTROBAN) 2 % ointment Apply topically 3 (three) times daily. 1 Tube 2    [DISCONTINUED] amoxicillin-clavulanate 500-125mg (AUGMENTIN) 500-125 mg Tab Take 1 tablet (500 mg total) by mouth 2 (two) times daily. for 7 days (Patient not taking: Reported on 9/1/2020) 14 tablet 0     No current facility-administered medications on file prior to visit.        Review of Systems   Constitutional: Negative for chills and fever.   HENT: Negative for congestion.    Respiratory: Negative for shortness of breath.    Cardiovascular: Negative for chest pain and palpitations.   Gastrointestinal: Negative for constipation, diarrhea, nausea and vomiting.   Genitourinary: Positive for decreased urine volume.        Patient on hemodialysis.   Musculoskeletal: Positive for gait problem.        Patient uses a wheelchair. Caregiver states he can take steps  with maximum assistance   Skin: Negative for rash and wound.   Psychiatric/Behavioral: Negative for agitation and behavioral problems.   All other systems reviewed and are negative.      Objective:      Physical Exam  Vitals signs reviewed.   Constitutional:       Appearance: Normal appearance. He is well-developed.   HENT:      Head: Normocephalic and atraumatic.      Mouth/Throat:      Pharynx: No oropharyngeal exudate.   Eyes:      Conjunctiva/sclera: Conjunctivae normal.   Neck:      Musculoskeletal: Normal range of motion.      Thyroid: No thyromegaly.      Vascular: No JVD.      Trachea: No tracheal deviation.   Cardiovascular:      Rate and Rhythm: Normal rate and regular rhythm.      Heart sounds: Normal heart sounds.   Pulmonary:      Effort: Pulmonary effort is normal.      Breath sounds: Normal breath sounds.   Abdominal:      General: Bowel sounds are normal. There is no distension.      Palpations: Abdomen is soft.      Tenderness: There is no abdominal tenderness.   Musculoskeletal: Normal range of motion.      Right lower leg: No edema.      Left lower leg: No edema.   Skin:     General: Skin is warm and dry.      Capillary Refill: Capillary refill takes less than 2 seconds.   Neurological:      Mental Status: He is alert. Mental status is at baseline. He is disoriented.   Psychiatric:         Mood and Affect: Mood normal.         Behavior: Behavior normal.         Assessment:       1. Essential hypertension    2. Abrasion of left hand, initial encounter    3. ESRD (end stage renal disease) on dialysis    4. Gastrointestinal hemorrhage, unspecified gastrointestinal hemorrhage type        Plan:       Micheal was seen today for hospital follow up.    Diagnoses and all orders for this visit:    Essential hypertension  -    (Restart) furosemide (LASIX) 40 MG tablet; Take 1 tablet by mouth daily on Tuesday, Thursday, Saturday, and Sunday    Abrasion of left hand, initial encounter  -     mupirocin (BACTROBAN)  2 % ointment; Apply topically 3 (three) times daily.    ESRD (end stage renal disease) on dialysis   Dialysis MWF via left upper arm fistula    Gastrointestinal hemorrhage, unspecified gastrointestinal hemorrhage type  -     Ambulatory referral/consult to Gastroenterology; Future    Transitional Care Note    Family and/or Caretaker present at visit?  Yes, caregiver.  Diagnostic tests reviewed/disposition: No diagnosic tests pending after this hospitalization.  Disease/illness education: Sepsis  Home health/community services discussion/referrals: Patient has home health established at prior to hospital stay.   Establishment or re-establishment of referral orders for community resources: No other necessary community resources.   Discussion with other health care providers: No discussion with other health care providers necessary.     Patient education provided.  All questions and concerns addressed  RTC PRN and if symptoms worsen or fail to improve  Patient verbalizes understanding     Patient Instructions     Kidney Disease: Eating Less Sodium  Sodium is a mineral that the body needs in small amounts. Sodium is found in table salt. Most people eat far more salt than they need. There are 2main reasons for this: Salt is present in high amounts in most processed foods (pre-prepared foods like breakfast cereals, cookies, and pickles) and in all restaurant foods. In other words, if you are not cooking from fresh ingredients at home, you are very likely eating more salt than you need. When sodium intake is too high, it can increase thirst and cause the body to retain fluid. To avoid these side effects, people with chronic kidney disease are often told to eat less sodium. The tips on this sheet can show you how.  People with chronic kidney disease should restrict their salt intake to less than 1,500 milligrams (mg) of sodium daily. Food labels generally report the sodium content. Table salt is sodium chloride. One level  teaspoon of table salt contains 2.300 mg of sodium.    When you shop for food  Unlike canned and processed foods, fresh foods have no added salt and are better for you. When youre food shopping:  · Choose fresh foods when you can.  · Read food labels before buying packaged foods. Check the labels nutrition facts for sodium amounts and servings per package.  · Try to pick packaged foods with a sodium content of 140 mg (milligrams) or less per serving.  · Do not choose foods with over 400 mg of sodium per serving.  Season instead of salt  Try the seasonings and foods listed below to season without sodium.  · Basil: tomatoes, squash, eggplant, soups, fish  · Bryan: soups, rice, lentils, chicken  · Dill: beets, cucumbers, green beans  · Garlic: sauces, vegetables, meats, fish  · Yeni: carrots, chicken, cooked fruit, white sauces  · Lemon: asparagus, artichokes, broccoli, spinach, fish  · Mint: cold soups, salads, fruit dishes  · Oregano: eggplant, chicken, salads, sauces  · Thyme: chicken, fish, lean meats, soups, stews  Food that has salt added during cooking tastes less salty than if salt is sprinkled on it at the table. Therefore, use half the amount of salt you want to have in your food during cooking, and sprinkle the other half on the food at the table.  Do not use seasoned salt or salt substitutes. They may contain sodium or potassium (another mineral people with kidney disease are often told to limit).  Date Last Reviewed: 2/1/2017  © 2192-1564 [employer]. [address] [place] [address], [place] [address]. All rights reserved. This information is not intended as a substitute for professional medical care. Always follow your healthcare professional's instructions.

## 2020-09-01 NOTE — PROGRESS NOTES
"Name: Micheal Acosta [facility] Number: [id number]  Date of Treatment: 08/23/2018   Diagnosis:   Encounter Diagnosis   Name Primary?    Dizziness        Time in: 1106  Time Out: 1200  Total Treatment Time: 54    Subjective:    Micheal reports no pain. "Always dizzy.". Missed last 2 appts 2* pneumonia-did not require hospitalization and states he is better now.     Objective:    Careful return to increased activity ex's.     Patient received individual therapy to increase strength, endurance, ROM, flexibility, posture and core stabilization with activities as follows:     Micheal received therapeutic exercises to develop strength, endurance, ROM, flexibility, posture and core stabilization for 54 minutes including:     High step 2 x 30'  Sidestepping 2 x 30'  Seated [place] ex's:     Eyes L/R 20     Eyes up/down 20  Gait with nodding up/down 2 x 30'  Gait with head turns 2 x 30'  Tandem walking with min A 2 x 30'  SLS L/R 3 x 15s in // bars  Seated c/s side flexion 10/2  Seated hamstring stretches 3/30s L/R  Standing gastroc stretches 3/30s B over 1/2 foam roll in // bars    Edu on importance of UE swing for balance and safety with gait.     Pt demo good understanding of the education provided. Patient demonstrated good return demonstration of activities.     Assessment:     Good tolerance for return to activity without SOB. Dizziness with exertion with all standing activities. Min A for tandem walking. Tends to adjoin his hands behind his back when walking with fwd trunk flexion.     Pt will continue to benefit from skilled PT intervention. Medical Necessity is demonstrated by:  Requires skilled supervision to complete and progress HEP and Weakness.    Patient is making good progress towards established goals.    Plan:    Continue with established Plan of Care towards PT goals.     " Home

## 2020-09-01 NOTE — PATIENT INSTRUCTIONS
Kidney Disease: Eating Less Sodium  Sodium is a mineral that the body needs in small amounts. Sodium is found in table salt. Most people eat far more salt than they need. There are 2main reasons for this: Salt is present in high amounts in most processed foods (pre-prepared foods like breakfast cereals, cookies, and pickles) and in all restaurant foods. In other words, if you are not cooking from fresh ingredients at home, you are very likely eating more salt than you need. When sodium intake is too high, it can increase thirst and cause the body to retain fluid. To avoid these side effects, people with chronic kidney disease are often told to eat less sodium. The tips on this sheet can show you how.  People with chronic kidney disease should restrict their salt intake to less than 1,500 milligrams (mg) of sodium daily. Food labels generally report the sodium content. Table salt is sodium chloride. One level teaspoon of table salt contains 2.300 mg of sodium.    When you shop for food  Unlike canned and processed foods, fresh foods have no added salt and are better for you. When youre food shopping:  · Choose fresh foods when you can.  · Read food labels before buying packaged foods. Check the labels nutrition facts for sodium amounts and servings per package.  · Try to pick packaged foods with a sodium content of 140 mg (milligrams) or less per serving.  · Do not choose foods with over 400 mg of sodium per serving.  Season instead of salt  Try the seasonings and foods listed below to season without sodium.  · Basil: tomatoes, squash, eggplant, soups, fish  · Bryan: soups, rice, lentils, chicken  · Dill: beets, cucumbers, green beans  · Garlic: sauces, vegetables, meats, fish  · Yeni: carrots, chicken, cooked fruit, white sauces  · Lemon: asparagus, artichokes, broccoli, spinach, fish  · Mint: cold soups, salads, fruit dishes  · Oregano: eggplant, chicken, salads, sauces  · Thyme: chicken, fish, lean meats,  soups, stews  Food that has salt added during cooking tastes less salty than if salt is sprinkled on it at the table. Therefore, use half the amount of salt you want to have in your food during cooking, and sprinkle the other half on the food at the table.  Do not use seasoned salt or salt substitutes. They may contain sodium or potassium (another mineral people with kidney disease are often told to limit).  Date Last Reviewed: 2/1/2017 © 2000-2017 [employer]. [address] [place] [address], [place] [address]. All rights reserved. This information is not intended as a substitute for professional medical care. Always follow your healthcare professional's instructions.

## 2020-09-11 NOTE — TELEPHONE ENCOUNTER
----- Message from Isabel Robertson MA sent at 9/11/2020  3:26 PM CDT -----  Type: Needs Medical Advice  [facility]  Best Call Back Number:   Additional Information: OT eval complete. No change in functional ADL status. Caregiver training complete. No further OT at patient's request.

## 2020-09-22 PROBLEM — K85.90 PANCREATITIS: Status: ACTIVE | Noted: 2020-01-01

## 2020-09-22 PROBLEM — I73.9 PVD (PERIPHERAL VASCULAR DISEASE): Status: ACTIVE | Noted: 2020-01-01

## 2020-09-22 PROBLEM — I69.359 HEMIPARESIS, APHASIA, AND DYSPHAGIA AS LATE EFFECTS OF STROKE: Status: ACTIVE | Noted: 2020-01-01

## 2020-09-22 PROBLEM — I69.391 HEMIPARESIS, APHASIA, AND DYSPHAGIA AS LATE EFFECTS OF STROKE: Status: ACTIVE | Noted: 2020-01-01

## 2020-09-22 PROBLEM — I69.320 HEMIPARESIS, APHASIA, AND DYSPHAGIA AS LATE EFFECTS OF STROKE: Status: ACTIVE | Noted: 2020-01-01

## 2020-09-22 PROBLEM — E11.29 TYPE 2 DIABETES MELLITUS WITH RENAL COMPLICATION: Status: ACTIVE | Noted: 2019-03-18

## 2020-09-22 NOTE — ED NOTES
Micheal Hunt presents to the ED with c/o left sided flank pain. Care giver who is at the bedside reports that she takes care of patient from [id number].  Patient asked to go to sleep last night at 1830 and upon checking on him today, he had complaint of left flank pain with some diarrhea. Patient arrives to ED with dry brief in place. Patient is wheel chair bound secondary to a stroke. Baseline right sided weakness. Patient is a MWF dialysis patient with his last treatment being on yesterday. Patient has history of dementia. AAO to self.     .   Mucous membranes are pink and moist. Skin is warm, dry and intact. Lungs are clear bilaterally, respirations are regular and unlabored. Denies SOB, cough, congestion or rhinorrhea. BS active x4, no tenderness with palpation, abd is soft and not distended. Denies any appetite or activity change. S1S2, capillary refill is < 2 seconds. Denies dysuria, difficulty urinating, frequency, numbness, tingling or weakness. LARRY PIZANO

## 2020-09-22 NOTE — ASSESSMENT & PLAN NOTE
Creatine stable for now. BMP reviewed- noted Estimated Creatinine Clearance: 10.7 mL/min (A) (based on SCr of 6.2 mg/dL (H)). according to latest data. Monitor UOP and serial BMP and adjust therapy as needed. Renally dose meds.      Consult nephrology.  Patient on hemodialysis Monday Wednesday and Friday.

## 2020-09-22 NOTE — ED NOTES
Pt passing scan blood clots during urination, pts caretaker concerned as she has never seen this before, MD aware.

## 2020-09-22 NOTE — ASSESSMENT & PLAN NOTE
Trend lipase.  Consult GI.  Pain control.  Lab Results   Component Value Date    LIPASE 112 (H) 09/22/2020

## 2020-09-22 NOTE — ED NOTES
Patient and care giver have  been updated on plan of care and lab results. No needs or questions at this time.

## 2020-09-22 NOTE — PLAN OF CARE
Plan of care reviewed with pt. SHANTELL throughout shift.  NPO. Continue to Monitor Labs. VSS.  Safety maintained. Will continue to monitor. No complaints at this time.

## 2020-09-22 NOTE — ASSESSMENT & PLAN NOTE
Patient's FSGs are controlled on current hypoglycemics.   Last A1c reviewed-   Lab Results   Component Value Date    HGBA1C 8.3 (H) 03/21/2019     Most recent fingerstick glucose reviewed- No results for input(s): POCTGLUCOSE in the last 24 hours.  Current correctional scale  Low  Maintain anti-hyperglycemic dose as follows-   Antihyperglycemics (From admission, onward)    Start     Stop Route Frequency Ordered    09/22/20 1402  insulin aspart U-100 pen 0-5 Units      -- SubQ Before meals & nightly PRN 09/22/20 1304        Hold Oral hypoglycemics while patient is in the hospital.

## 2020-09-22 NOTE — SUBJECTIVE & OBJECTIVE
Past Medical History:   Diagnosis Date    NICK (acute kidney injury) 7/29/2016    Allergy     Anemia, mild 12/15/2014    Arthritis     Gout    Atrial fibrillation 03/2019    Benign essential HTN 3/27/2012    BMI 29.0-29.9,adult 5/10/2018    BPH (benign prostatic hyperplasia)     BPH (benign prostatic hyperplasia)     CAD (coronary artery disease) 2006    Carotid artery occlusion     60-70% FROYLAN, LICA < 50%    Chronic kidney disease     due to ibuprofen    Colon polyp     CRF (chronic renal failure), stage 5     Diabetes mellitus     Diverticulosis     ESRD (end stage renal disease) on dialysis     Gastritis     GERD (gastroesophageal reflux disease)     Gout     History of colon polyps 5/3/2018    HTN (hypertension) 3/27/2012    Hyperlipidemia     Hyperlipidemia     LLL pneumonia 6/14/2018    LVH (left ventricular hypertrophy)     Mesenteric ischemia     Murmur, cardiac 3/27/2012    GRICELDA (obstructive sleep apnea)     DOES NOT USE A MACHINE    Sinus problem     Stroke 03/2019    acute left PCA    Syncope and collapse        Past Surgical History:   Procedure Laterality Date    APPENDECTOMY      CARDIAC CATHETERIZATION  2012    LIMA to LAD patent, SVG to RCA    COLONOSCOPY  2011    COLONOSCOPY N/A 5/3/2018    Procedure: COLONOSCOPY;  Surgeon: Messi Harris MD;  Location: [facility] ENDO;  Service: Endoscopy;  Laterality: N/A;    COLONOSCOPY N/A 8/18/2020    Procedure: COLONOSCOPY;  Surgeon: Messi Harris MD;  Location: [facility] ENDO;  Service: Endoscopy;  Laterality: N/A;    CORONARY ARTERY BYPASS GRAFT  4/2007    x 2 [place]    ESOPHAGOGASTRODUODENOSCOPY N/A 8/17/2020    Procedure: EGD (ESOPHAGOGASTRODUODENOSCOPY);  Surgeon: Eben Soto MD;  Location: [facility] ENDO;  Service: Endoscopy;  Laterality: N/A;    JOINT REPLACEMENT      left knee total replacement  X 3    mid leftt finger      from a cactuss    MOLE REMOVAL  2016    rotative cuff      no rotative cuffs on bilat  shoulders has pins     SHOULDER SURGERY      shoulder surgery bilat  RIGHT X 4; LEFT X 3       Review of patient's allergies indicates:   Allergen Reactions    Ace inhibitors Other (See Comments)     Cough    Angiotensin ii     Arb-angiotensin receptor antagonist Itching    Eplerenone Other (See Comments)     Marked bradycardia, 40, tiredness and weakness      Sulfa (sulfonamide antibiotics) Itching and Swelling     Patient says this was 10 years ago and doesn't remember what happened       No current facility-administered medications on file prior to encounter.      Current Outpatient Medications on File Prior to Encounter   Medication Sig    albuterol (PROVENTIL/VENTOLIN HFA) 90 mcg/actuation inhaler 2 puffs every 4 hours as needed for cough, wheeze, or shortness of breath    allopurinoL (ZYLOPRIM) 100 MG tablet 100 mg by FEEDING TUBE route once daily.    apixaban (ELIQUIS) 2.5 mg Tab 2.5 mg by FEEDING TUBE route 2 (two) times daily.    aspirin (ECOTRIN) 81 MG EC tablet 81 mg by FEEDING TUBE route once daily.    atorvastatin (LIPITOR) 40 MG tablet 1 tablet (40 mg total) by Per G Tube route once daily.    B complex-vitamin C-folic acid 800 mcg Chew 1 tablet by Per G Tube route once daily.     carvediloL (COREG) 12.5 MG tablet Take 6.5 tablets (81.25 mg total) by mouth 2 (two) times daily. (Patient taking differently: 78.125 mg by Per G Tube route 2 (two) times daily. )    finasteride (PROSCAR) 5 mg tablet Take 1 tablet (5 mg total) by mouth once daily. (Patient taking differently: 5 mg by Per G Tube route once daily. )    fluticasone (FLONASE) 50 mcg/actuation nasal spray 2 sprays (100 mcg total) by Each Nare route once daily. (Patient taking differently: 2 sprays by Each Nostril route daily as needed for Rhinitis. )    furosemide (LASIX) 40 MG tablet Take 1 tablet by mouth daily on Tuesday, Thursday, Saturday, and Sunday    levETIRAcetam (KEPPRA) 100 mg/mL Soln 7.5 ml by PEG daily (Patient taking  differently: 750 mg by Per G Tube route once daily. 7.5 ml by PEG daily)    melatonin 10 mg Tab 10 mg by Per G Tube route nightly.     mupirocin (BACTROBAN) 2 % ointment Apply topically 3 (three) times daily.    nut.tx.imp.renal fxn,lac-reduc (NEPRO CARB STEADY ORAL) 240 mLs by FEEDING TUBE route 2 (two) times daily. 6 hours apart AM and afternoon    nut.tx.imp.renal fxn,lac-reduc (NEPRO CARB STEADY ORAL) 1,000 mLs by FEEDING TUBE route every evening.     pantoprazole (PROTONIX) 40 MG tablet Take 1 tablet (40 mg total) by mouth once daily.    polyethylene glycol (GLYCOLAX) 17 gram PwPk 17 g by Per G Tube route once daily.     pramoxine-hydrocortisone cream Apply topically 3 (three) times daily.    senna-docusate 8.6-50 mg (PERICOLACE) 8.6-50 mg per tablet 1 tablet by Per G Tube route once daily.     tamsulosin (FLOMAX) 0.4 mg Cap Take 1 capsule (0.4 mg total) by mouth once daily.    tiotropium bromide (SPIRIVA RESPIMAT) 1.25 mcg/actuation Mist Inhale 2.5 mcg into the lungs once daily. Controller    vit b cmplx 3-fa-vit c-biotin 1- mg-mg-mcg (NEPHRO-EMMETT RX) 1- mg-mg-mcg Tab Take 1 tablet by mouth once daily. (Patient taking differently: 1 tablet by Per G Tube route once daily. )     Family History     Problem Relation (Age of Onset)    Cancer Father    Heart disease Mother, Sister    Hypertension Brother    Pneumonia Sister    Stroke Sister    Sudden death Father        Tobacco Use    Smoking status: Never Smoker    Smokeless tobacco: Never Used   Substance and Sexual Activity    Alcohol use: No     Frequency: Never     Drinks per session: Patient refused     Binge frequency: Patient refused    Drug use: No    Sexual activity: Not on file     Review of Systems   Gastrointestinal: Positive for abdominal pain and diarrhea.   Neurological: Positive for facial asymmetry, speech difficulty, weakness (Right-sided hemiparesis) and numbness.     Objective:     Vital Signs (Most Recent):  Temp:  97.6 °F (36.4 °C) (09/22/20 1524)  Pulse: 66 (09/22/20 1524)  Resp: 16 (09/22/20 1524)  BP: (!) 142/84 (09/22/20 1524)  SpO2: 100 % (09/22/20 1524) Vital Signs (24h Range):  Temp:  [97.6 °F (36.4 °C)] 97.6 °F (36.4 °C)  Pulse:  [58-75] 66  Resp:  [16-20] 16  SpO2:  [99 %-100 %] 100 %  BP: (142-171)/(81-87) 142/84     Weight: 85.3 kg (188 lb)  Body mass index is 24.8 kg/m².    Physical Exam  Vitals signs and nursing note reviewed.   Constitutional:       General: He is not in acute distress.     Appearance: Normal appearance. He is well-developed and normal weight.   HENT:      Head: Normocephalic and atraumatic.      Right Ear: External ear normal.      Left Ear: External ear normal.      Nose: Nose normal.      Mouth/Throat:      Mouth: Mucous membranes are moist.   Eyes:      Conjunctiva/sclera: Conjunctivae normal.      Pupils: Pupils are equal, round, and reactive to light.   Neck:      Musculoskeletal: Normal range of motion and neck supple.   Cardiovascular:      Rate and Rhythm: Normal rate. Rhythm irregular.      Pulses: Normal pulses.      Heart sounds: Murmur present.   Pulmonary:      Effort: Pulmonary effort is normal.      Breath sounds: Normal breath sounds. No wheezing.   Abdominal:      General: Bowel sounds are normal.      Palpations: Abdomen is soft.      Tenderness: There is abdominal tenderness (RLQ tenderness).      Comments: PEG tube intact. Site looks good   Musculoskeletal: Normal range of motion.   Skin:     General: Skin is warm and dry.      Capillary Refill: Capillary refill takes 2 to 3 seconds.   Neurological:      Mental Status: He is alert.      Motor: Weakness present.      Coordination: Coordination abnormal.      Gait: Gait abnormal.      Comments: Patient with expressive aphasia speaks few words.  Follows commands.  He has right-sided hemiparesis.  He is wheelchair bound.   Psychiatric:         Mood and Affect: Mood normal.         Behavior: Behavior normal.           CRANIAL  NERVES     CN III, IV, VI   Pupils are equal, round, and reactive to light.       Significant Labs:   CBC:   Recent Labs   Lab 09/22/20  1012   WBC 7.38   HGB 11.8*   HCT 36.0*        CMP:   Recent Labs   Lab 09/22/20  1012      K 4.8      CO2 20*   GLU 86   BUN 44*   CREATININE 6.2*   CALCIUM 9.4   PROT 8.0   ALBUMIN 4.1   BILITOT 0.3   ALKPHOS 100   AST 15   ALT 15   ANIONGAP 15   EGFRNONAA 8*     Cardiac Markers: No results for input(s): CKMB, MYOGLOBIN, BNP, TROPISTAT in the last 48 hours.    Significant Imaging: I have reviewed and interpreted all pertinent imaging results/findings within the past 24 hours.

## 2020-09-22 NOTE — ED NOTES
Care taker at the bedside is aware that patient had a straight cath done and clots are most likely from cath. Patient does not have any bleeding noted at this time. Patient's brief has been changed.

## 2020-09-22 NOTE — ASSESSMENT & PLAN NOTE
Patient is chronically on statin.will continue for now. Monitor clinically. Last LDL was   Lab Results   Component Value Date    LDLCALC 46.4 (L) 07/26/2018

## 2020-09-22 NOTE — ASSESSMENT & PLAN NOTE
Patient with right-sided Hemiparesis expressive aphasia and dysphagia.  Continue tube feedings.  Consult dietary for recommendations.

## 2020-09-22 NOTE — HPI
Micheal Hunt is a 79 y.o. male with PMH of CVA with left-sided deficits, ESRD on dialysis, and a.fib on eliquia, seizures, hypertension and hyperlipidemia who presents to the ED for evaluation of left lower quadrant abdominal pain  5/10 sharp achy pain associated with diarrhea since yesterday.  He reports pain increases with movement alleviates with rest.  His caretaker states he began having diarrhea yesterday with 4 episodes during the day.  She states that he is on tube feedings due to dysphagia although he occasionally has soft foods by mouth.  Patient currently receives hemodialysis Monday Wednesday and Friday he did have dialysis yesterday.  Patient is a poor historian and minimally conversive therefore information is gathered from his caretaker.

## 2020-09-22 NOTE — ED PROVIDER NOTES
Encounter Date: 9/22/2020    SCRIBE #1 NOTE: IMala am scribing for, and in the presence of, Bill Cerna MD.       History     Chief Complaint   Patient presents with    Abdominal Pain     left lower abdominal pain       Time seen by provider: 9:35 AM on 09/22/2020    Micheal Hunt is a 79 y.o. male with PMHx of CVA with left-sided deficits, ESRD on dialysis, and a.fib on eliquis who presents to the ED via EMS with LLQ abdominal pain and diarrhea. Patient is a somewhat limited historian, and it is unclear of time of onset. Last dialyzed yesterday. No headache, chest pain, or shortness of breath. The patient denies fever, nausea, vomiting, or any other symptoms at this time. PSHx of appendectomy.    The history is provided by the patient and the EMS personnel.     Review of patient's allergies indicates:   Allergen Reactions    Ace inhibitors Other (See Comments)     Cough    Angiotensin ii     Arb-angiotensin receptor antagonist Itching    Eplerenone Other (See Comments)     Marked bradycardia, 40, tiredness and weakness      Sulfa (sulfonamide antibiotics) Itching and Swelling     Patient says this was 10 years ago and doesn't remember what happened     Past Medical History:   Diagnosis Date    NICK (acute kidney injury) 7/29/2016    Allergy     Anemia, mild 12/15/2014    Arthritis     Gout    Atrial fibrillation 03/2019    Benign essential HTN 3/27/2012    BMI 29.0-29.9,adult 5/10/2018    BPH (benign prostatic hyperplasia)     BPH (benign prostatic hyperplasia)     CAD (coronary artery disease) 2006    Carotid artery occlusion     60-70% FROYLAN, LICA < 50%    Chronic kidney disease     due to ibuprofen    Colon polyp     CRF (chronic renal failure), stage 5     Diabetes mellitus     Diverticulosis     ESRD (end stage renal disease) on dialysis     Gastritis     GERD (gastroesophageal reflux disease)     Gout     History of colon polyps 5/3/2018    HTN (hypertension)  3/27/2012    Hyperlipidemia     Hyperlipidemia     LLL pneumonia 2018    LVH (left ventricular hypertrophy)     Mesenteric ischemia     Murmur, cardiac 3/27/2012    GRICELDA (obstructive sleep apnea)     DOES NOT USE A MACHINE    Sinus problem     Stroke 2019    acute left PCA    Syncope and collapse      Past Surgical History:   Procedure Laterality Date    APPENDECTOMY      CARDIAC CATHETERIZATION      LIMA to LAD patent, SVG to RCA    COLONOSCOPY      COLONOSCOPY N/A 5/3/2018    Procedure: COLONOSCOPY;  Surgeon: Messi Harris MD;  Location: [facility] ENDO;  Service: Endoscopy;  Laterality: N/A;    COLONOSCOPY N/A 2020    Procedure: COLONOSCOPY;  Surgeon: Messi Harris MD;  Location: [facility] ENDO;  Service: Endoscopy;  Laterality: N/A;    CORONARY ARTERY BYPASS GRAFT  4/2007    x 2 [place]    ESOPHAGOGASTRODUODENOSCOPY N/A 2020    Procedure: EGD (ESOPHAGOGASTRODUODENOSCOPY);  Surgeon: Eben Soto MD;  Location: [facility] ENDO;  Service: Endoscopy;  Laterality: N/A;    JOINT REPLACEMENT      left knee total replacement  X 3    mid leftt finger      from a cactuss    MOLE REMOVAL  2016    rotative cuff      no rotative cuffs on bilat shoulders has pins     SHOULDER SURGERY      shoulder surgery bilat  RIGHT X 4; LEFT X 3     Family History   Problem Relation Age of Onset    Heart disease Mother     Sudden death Father     Cancer Father         advanced lung ca- found after 2 story fall    Stroke Sister     Pneumonia Sister          from PNA    Heart disease Sister     Hypertension Brother     Kidney cancer Neg Hx     Prostate cancer Neg Hx     Urolithiasis Neg Hx     Allergic rhinitis Neg Hx     Allergies Neg Hx     Angioedema Neg Hx     Asthma Neg Hx     Atopy Neg Hx     Eczema Neg Hx     Immunodeficiency Neg Hx     Rhinitis Neg Hx     Urticaria Neg Hx      Social History     Tobacco Use    Smoking status: Never Smoker    Smokeless tobacco:  Never Used   Substance Use Topics    Alcohol use: No     Frequency: Never     Drinks per session: Patient refused     Binge frequency: Patient refused    Drug use: No     Review of Systems   Constitutional: Negative for fever.   HENT: Negative for sore throat.    Respiratory: Negative for shortness of breath.    Cardiovascular: Negative for chest pain.   Gastrointestinal: Positive for abdominal pain and diarrhea. Negative for nausea and vomiting.   Genitourinary: Negative for dysuria.   Musculoskeletal: Negative for back pain.   Skin: Negative for rash.   Neurological: Negative for headaches.   Hematological: Bruises/bleeds easily.       Physical Exam     Initial Vitals [09/22/20 0934]   BP Pulse Resp Temp SpO2   (!) 157/83 (!) 58 20 97.6 °F (36.4 °C) 100 %      MAP       --         Physical Exam    Nursing note and vitals reviewed.  Constitutional: He appears well-developed and well-nourished. He is not diaphoretic. No distress.   HENT:   Head: Normocephalic and atraumatic.   Eyes: EOM are normal. Pupils are equal, round, and reactive to light.   Neck: Normal range of motion. Neck supple.   Cardiovascular: Normal rate, regular rhythm, normal heart sounds and intact distal pulses. Exam reveals no gallop and no friction rub.    No murmur heard.  Pulmonary/Chest: Breath sounds normal. No respiratory distress. He has no wheezes. He has no rhonchi. He has no rales.   Abdominal: Soft. Bowel sounds are normal. There is abdominal tenderness in the left lower quadrant. There is no rebound and no guarding.   LLQ abdominal tenderness to palpation. PEG tube in place.   Musculoskeletal: Normal range of motion.      Comments: Shunt to the left upper arm with good thrill and bruit.   Neurological: He is alert and oriented to person, place, and time.   Chronic left sided weakness.   Skin: Skin is warm.   Psychiatric: He has a normal mood and affect. His behavior is normal. Judgment and thought content normal.         ED Course    Procedures  Labs Reviewed   CBC W/ AUTO DIFFERENTIAL - Abnormal; Notable for the following components:       Result Value    RBC 3.52 (*)     Hemoglobin 11.8 (*)     Hematocrit 36.0 (*)     Mean Corpuscular Volume 102 (*)     Mean Corpuscular Hemoglobin 33.5 (*)     RDW 14.8 (*)     Immature Granulocytes 0.7 (*)     Immature Grans (Abs) 0.05 (*)     All other components within normal limits   LIPASE - Abnormal; Notable for the following components:    Lipase 112 (*)     All other components within normal limits   URINALYSIS, REFLEX TO URINE CULTURE - Abnormal; Notable for the following components:    Protein, UA 2+ (*)     Occult Blood UA 3+ (*)     All other components within normal limits    Narrative:     Specimen Source->Urine   COMPREHENSIVE METABOLIC PANEL - Abnormal; Notable for the following components:    CO2 20 (*)     BUN, Bld 44 (*)     Creatinine 6.2 (*)     eGFR if  9 (*)     eGFR if non  8 (*)     All other components within normal limits   URINALYSIS MICROSCOPIC - Abnormal; Notable for the following components:    RBC, UA 75 (*)     Bacteria Many (*)     All other components within normal limits    Narrative:     Specimen Source->Urine   SARS-COV-2 RNA AMPLIFICATION, QUAL   POCT GLUCOSE MONITORING CONTINUOUS          Imaging Results          CT Renal Stone Study ABD Pelvis WO (Final result)  Result time 09/22/20 12:19:50    Final result by Salty Patiño Jr., MD (09/22/20 12:19:50)                 Impression:      Severe atherosclerotic calcification identified throughout including the coronary arteries, the abdominal aorta and pelvic vessels, the mesenteric arteries including the hepatic arteries and the renal arteries bilaterally with stenosis suspected.  Bilateral renal cortical atrophy and bilateral renal cysts noted without hydronephrosis.  Prior cholecystectomy.  Gastrostomy tube in place.  Prostate hypertrophy.  Diverticulosis coli without CT evidence of  diverticulitis.      Electronically signed by: Salty Patiño MD  Date:    09/22/2020  Time:    12:19             Narrative:    EXAMINATION:  CT RENAL STONE STUDY ABD PELVIS WO    CLINICAL HISTORY:  Abdominal pain, acute, nonlocalized;    TECHNIQUE:  Low dose axial images, sagittal and coronal reformations were obtained from the lung bases to the pubic symphysis.  Contrast was not administered.    COMPARISON:  CT abdomen and pelvis of April 1, 2019.    FINDINGS:  The patient has remarkable coronary artery calcification noted.  There is also extensive calcification of the abdominal aorta, mesenteric and renal vessels and hepatic arteries..    The liver is of normal size contour and general CT density.  The gallbladder is absent with surgical clips noted.  The pancreas appears of normal contour and CT density without edema or mass.  There are calcifications within the pancreas which may be related to this patient's extensive atherosclerosis or chronic pancreatitis.  A pseudocyst or mass however is not seen.  The spleen is of normal size and CT density.    The adrenal glands are not enlarged.  The kidneys are small with cortical atrophy bilaterally.  There are several cyst on each kidney the largest at the upper pole on the right measuring 4.3 cm.  Stone or hydronephrosis is not identified.  Retroperitoneal adenopathy is not seen.    The stomach contains a gastrostomy tube and a radiodensity a probable clip noted.  Small bowel dilatation or air-fluid levels are not seen.  The colon is of normal configuration except for diverticuli scattered throughout.  Free fluid is not seen.    The bladder is not full.  The prostate is significantly enlarged measuring 5.8 cm front to back and 6 cm transversely.  Asymmetry is not seen.  No free fluid in the pelvis is noted.                                 Medical Decision Making:   History:   Old Medical Records: I decided to obtain old medical records.  Initial Assessment:    79-year-old male presented with abdominal pain.  Differential Diagnosis:   Initial differential diagnosis included but not limited to colitis, diverticulitis and enteritis.  Clinical Tests:   Lab Tests: Ordered and Reviewed  Radiological Study: Ordered and Reviewed  Medical Tests: Ordered and Reviewed  ED Management:  The patient was emergently evaluated in the emergency department, his evaluation was significant for an elderly male with left-sided abdominal tenderness.  The patient's labs were significant for an elevated lipase level.  The patient's CT scan does show severe atherosclerotic disease per Radiology.  The patient may have a pancreatitis.  I will admit the patient to the hospitalist service for further care.  The case was discussed with the APC on call for the hospitalist service.  She has accepted the patient for admission.            Scribe Attestation:   Scribe #1: I performed the above scribed service and the documentation accurately describes the services I performed. I attest to the accuracy of the note.              I, Dr. Bill Cerna, personally performed the services described in this documentation. All medical record entries made by the scribe were at my direction and in my presence.  I have reviewed the chart and agree that the record reflects my personal performance and is accurate and complete. Bill Cerna MD.  3:40 PM 09/22/2020           Clinical Impression:     ICD-10-CM ICD-9-CM   1. Pancreatitis  K85.90 577.0   2. Chest pain  R07.9 786.50                          ED Disposition Condition    Observation                             Bill Cerna MD  09/22/20 [id number]

## 2020-09-22 NOTE — ED NOTES
Patient has been updated on plan of care and lab results. No needs or questions at this time.   Patient has call light within reach.

## 2020-09-22 NOTE — ASSESSMENT & PLAN NOTE
Patient with severe arthrosclerosis noted to carotid arteries, aortic atherosclerosis and mesenteric atherosclerosis.  Continue statin.

## 2020-09-22 NOTE — H&P
[facility] Medicine  History & Physical    Patient Name: Micheal Hunt  MRN: [id number]  Admission Date: 9/22/2020  Attending Physician: Bell Wilkinson NP  Primary Care Provider: Pk Lakhani MD         Patient information was obtained from patient, caregiver / friend and ER records.     Subjective:     Principal Problem:Pancreatitis    Chief Complaint:   Chief Complaint   Patient presents with    Abdominal Pain     left lower abdominal pain        HPI: Micheal Hunt is a 79 y.o. male with PMH of CVA with left-sided deficits, ESRD on dialysis, and a.fib on eliquia, seizures, hypertension and hyperlipidemia who presents to the ED for evaluation of left lower quadrant abdominal pain  5/10 sharp achy pain associated with diarrhea since yesterday.  He reports pain increases with movement alleviates with rest.  His caretaker states he began having diarrhea yesterday with 4 episodes during the day.  She states that he is on tube feedings due to dysphagia although he occasionally has soft foods by mouth.  Patient currently receives hemodialysis Monday Wednesday and Friday he did have dialysis yesterday.  Patient is a poor historian and minimally conversive therefore information is gathered from his caretaker.       Past Medical History:   Diagnosis Date    NICK (acute kidney injury) 7/29/2016    Allergy     Anemia, mild 12/15/2014    Arthritis     Gout    Atrial fibrillation 03/2019    Benign essential HTN 3/27/2012    BMI 29.0-29.9,adult 5/10/2018    BPH (benign prostatic hyperplasia)     BPH (benign prostatic hyperplasia)     CAD (coronary artery disease) 2006    Carotid artery occlusion     60-70% FORYLAN, LICA < 50%    Chronic kidney disease     due to ibuprofen    Colon polyp     CRF (chronic renal failure), stage 5     Diabetes mellitus     Diverticulosis     ESRD (end stage renal disease) on dialysis     Gastritis     GERD (gastroesophageal reflux  disease)     Gout     History of colon polyps 5/3/2018    HTN (hypertension) 3/27/2012    Hyperlipidemia     Hyperlipidemia     LLL pneumonia 6/14/2018    LVH (left ventricular hypertrophy)     Mesenteric ischemia     Murmur, cardiac 3/27/2012    GRICELDA (obstructive sleep apnea)     DOES NOT USE A MACHINE    Sinus problem     Stroke 03/2019    acute left PCA    Syncope and collapse        Past Surgical History:   Procedure Laterality Date    APPENDECTOMY      CARDIAC CATHETERIZATION  2012    LIMA to LAD patent, SVG to RCA    COLONOSCOPY  2011    COLONOSCOPY N/A 5/3/2018    Procedure: COLONOSCOPY;  Surgeon: Messi Harris MD;  Location: [facility] ENDO;  Service: Endoscopy;  Laterality: N/A;    COLONOSCOPY N/A 8/18/2020    Procedure: COLONOSCOPY;  Surgeon: Messi Harris MD;  Location: [facility] ENDO;  Service: Endoscopy;  Laterality: N/A;    CORONARY ARTERY BYPASS GRAFT  4/2007    x 2 [place]    ESOPHAGOGASTRODUODENOSCOPY N/A 8/17/2020    Procedure: EGD (ESOPHAGOGASTRODUODENOSCOPY);  Surgeon: Eben Soto MD;  Location: [facility] ENDO;  Service: Endoscopy;  Laterality: N/A;    JOINT REPLACEMENT      left knee total replacement  X 3    mid leftt finger      from a cactuss    MOLE REMOVAL  2016    rotative cuff      no rotative cuffs on bilat shoulders has pins     SHOULDER SURGERY      shoulder surgery bilat  RIGHT X 4; LEFT X 3       Review of patient's allergies indicates:   Allergen Reactions    Ace inhibitors Other (See Comments)     Cough    Angiotensin ii     Arb-angiotensin receptor antagonist Itching    Eplerenone Other (See Comments)     Marked bradycardia, 40, tiredness and weakness      Sulfa (sulfonamide antibiotics) Itching and Swelling     Patient says this was 10 years ago and doesn't remember what happened       No current facility-administered medications on file prior to encounter.      Current Outpatient Medications on File Prior to Encounter   Medication Sig    albuterol  (PROVENTIL/VENTOLIN HFA) 90 mcg/actuation inhaler 2 puffs every 4 hours as needed for cough, wheeze, or shortness of breath    allopurinoL (ZYLOPRIM) 100 MG tablet 100 mg by FEEDING TUBE route once daily.    apixaban (ELIQUIS) 2.5 mg Tab 2.5 mg by FEEDING TUBE route 2 (two) times daily.    aspirin (ECOTRIN) 81 MG EC tablet 81 mg by FEEDING TUBE route once daily.    atorvastatin (LIPITOR) 40 MG tablet 1 tablet (40 mg total) by Per G Tube route once daily.    B complex-vitamin C-folic acid 800 mcg Chew 1 tablet by Per G Tube route once daily.     carvediloL (COREG) 12.5 MG tablet Take 6.5 tablets (81.25 mg total) by mouth 2 (two) times daily. (Patient taking differently: 78.125 mg by Per G Tube route 2 (two) times daily. )    finasteride (PROSCAR) 5 mg tablet Take 1 tablet (5 mg total) by mouth once daily. (Patient taking differently: 5 mg by Per G Tube route once daily. )    fluticasone (FLONASE) 50 mcg/actuation nasal spray 2 sprays (100 mcg total) by Each Nare route once daily. (Patient taking differently: 2 sprays by Each Nostril route daily as needed for Rhinitis. )    furosemide (LASIX) 40 MG tablet Take 1 tablet by mouth daily on Tuesday, Thursday, Saturday, and Sunday    levETIRAcetam (KEPPRA) 100 mg/mL Soln 7.5 ml by PEG daily (Patient taking differently: 750 mg by Per G Tube route once daily. 7.5 ml by PEG daily)    melatonin 10 mg Tab 10 mg by Per G Tube route nightly.     mupirocin (BACTROBAN) 2 % ointment Apply topically 3 (three) times daily.    nut.tx.imp.renal fxn,lac-reduc (NEPRO CARB STEADY ORAL) 240 mLs by FEEDING TUBE route 2 (two) times daily. 6 hours apart AM and afternoon    nut.tx.imp.renal fxn,lac-reduc (NEPRO CARB STEADY ORAL) 1,000 mLs by FEEDING TUBE route every evening.     pantoprazole (PROTONIX) 40 MG tablet Take 1 tablet (40 mg total) by mouth once daily.    polyethylene glycol (GLYCOLAX) 17 gram PwPk 17 g by Per G Tube route once daily.     pramoxine-hydrocortisone  cream Apply topically 3 (three) times daily.    senna-docusate 8.6-50 mg (PERICOLACE) 8.6-50 mg per tablet 1 tablet by Per G Tube route once daily.     tamsulosin (FLOMAX) 0.4 mg Cap Take 1 capsule (0.4 mg total) by mouth once daily.    tiotropium bromide (SPIRIVA RESPIMAT) 1.25 mcg/actuation Mist Inhale 2.5 mcg into the lungs once daily. Controller    vit b cmplx 3-fa-vit c-biotin 1- mg-mg-mcg (NEPHRO-EMMETT RX) 1- mg-mg-mcg Tab Take 1 tablet by mouth once daily. (Patient taking differently: 1 tablet by Per G Tube route once daily. )     Family History     Problem Relation (Age of Onset)    Cancer Father    Heart disease Mother, Sister    Hypertension Brother    Pneumonia Sister    Stroke Sister    Sudden death Father        Tobacco Use    Smoking status: Never Smoker    Smokeless tobacco: Never Used   Substance and Sexual Activity    Alcohol use: No     Frequency: Never     Drinks per session: Patient refused     Binge frequency: Patient refused    Drug use: No    Sexual activity: Not on file     Review of Systems   Gastrointestinal: Positive for abdominal pain and diarrhea.   Neurological: Positive for facial asymmetry, speech difficulty, weakness (Right-sided hemiparesis) and numbness.     Objective:     Vital Signs (Most Recent):  Temp: 97.6 °F (36.4 °C) (09/22/20 1524)  Pulse: 66 (09/22/20 1524)  Resp: 16 (09/22/20 1524)  BP: (!) 142/84 (09/22/20 1524)  SpO2: 100 % (09/22/20 1524) Vital Signs (24h Range):  Temp:  [97.6 °F (36.4 °C)] 97.6 °F (36.4 °C)  Pulse:  [58-75] 66  Resp:  [16-20] 16  SpO2:  [99 %-100 %] 100 %  BP: (142-171)/(81-87) 142/84     Weight: 85.3 kg (188 lb)  Body mass index is 24.8 kg/m².    Physical Exam  Vitals signs and nursing note reviewed.   Constitutional:       General: He is not in acute distress.     Appearance: Normal appearance. He is well-developed and normal weight.   HENT:      Head: Normocephalic and atraumatic.      Right Ear: External ear normal.      Left  Ear: External ear normal.      Nose: Nose normal.      Mouth/Throat:      Mouth: Mucous membranes are moist.   Eyes:      Conjunctiva/sclera: Conjunctivae normal.      Pupils: Pupils are equal, round, and reactive to light.   Neck:      Musculoskeletal: Normal range of motion and neck supple.   Cardiovascular:      Rate and Rhythm: Normal rate. Rhythm irregular.      Pulses: Normal pulses.      Heart sounds: Murmur present.   Pulmonary:      Effort: Pulmonary effort is normal.      Breath sounds: Normal breath sounds. No wheezing.   Abdominal:      General: Bowel sounds are normal.      Palpations: Abdomen is soft.      Tenderness: There is abdominal tenderness (RLQ tenderness).      Comments: PEG tube intact. Site looks good   Musculoskeletal: Normal range of motion.   Skin:     General: Skin is warm and dry.      Capillary Refill: Capillary refill takes 2 to 3 seconds.   Neurological:      Mental Status: He is alert.      Motor: Weakness present.      Coordination: Coordination abnormal.      Gait: Gait abnormal.      Comments: Patient with expressive aphasia speaks few words.  Follows commands.  He has right-sided hemiparesis.  He is wheelchair bound.   Psychiatric:         Mood and Affect: Mood normal.         Behavior: Behavior normal.           CRANIAL NERVES     CN III, IV, VI   Pupils are equal, round, and reactive to light.       Significant Labs:   CBC:   Recent Labs   Lab 09/22/20  1012   WBC 7.38   HGB 11.8*   HCT 36.0*        CMP:   Recent Labs   Lab 09/22/20  1012      K 4.8      CO2 20*   GLU 86   BUN 44*   CREATININE 6.2*   CALCIUM 9.4   PROT 8.0   ALBUMIN 4.1   BILITOT 0.3   ALKPHOS 100   AST 15   ALT 15   ANIONGAP 15   EGFRNONAA 8*     Cardiac Markers: No results for input(s): CKMB, MYOGLOBIN, BNP, TROPISTAT in the last 48 hours.    Significant Imaging: I have reviewed and interpreted all pertinent imaging results/findings within the past 24 hours.    Assessment/Plan:     *  Pancreatitis  Trend lipase.  Consult GI.  Pain control.  Lab Results   Component Value Date    LIPASE 112 (H) 09/22/2020           PVD (peripheral vascular disease)  Patient with severe arthrosclerosis noted to carotid arteries, aortic atherosclerosis and mesenteric atherosclerosis.  Continue statin.      Hemiparesis, aphasia, and dysphagia as late effects of stroke  Patient with right-sided Hemiparesis expressive aphasia and dysphagia.  Continue tube feedings.  Consult dietary for recommendations.      Seizure    Resume home Keppra.  Seizure precautions.    PEG (percutaneous endoscopic gastrostomy) status  Care per nursing      ESRD (end stage renal disease) on dialysis  Creatine stable for now. BMP reviewed- noted Estimated Creatinine Clearance: 10.7 mL/min (A) (based on SCr of 6.2 mg/dL (H)). according to latest data. Monitor UOP and serial BMP and adjust therapy as needed. Renally dose meds.      Consult nephrology.  Patient on hemodialysis Monday Wednesday and Friday.      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  Latest blood pressure and vitals reviewed-   Temp:  [97.6 °F (36.4 °C)]   Pulse:  [58-75]   Resp:  [16-20]   BP: (142-171)/(81-87)   SpO2:  [99 %-100 %] .   Home meds for hypertension were reviewed and noted below. Hospital anti-hypertensive changes were made as shown below.  Hypertension Medications             carvediloL (COREG) 12.5 MG tablet Take 6.5 tablets (81.25 mg total) by mouth 2 (two) times daily.      Hospital Medications             carvediloL tablet 78.125 mg 78.125 mg, Per G Tube, 2 times daily        Will utilize p.r.n. blood pressure medication only if patient's blood pressure greater than  180/110 and he develops symptoms such as worsening chest pain or shortness of breath.        Type 2 diabetes mellitus with renal complication  Patient's FSGs are controlled on current hypoglycemics.   Last A1c reviewed-   Lab  Results   Component Value Date    HGBA1C 8.3 (H) 03/21/2019     Most recent fingerstick glucose reviewed- No results for input(s): POCTGLUCOSE in the last 24 hours.  Current correctional scale  Low  Maintain anti-hyperglycemic dose as follows-   Antihyperglycemics (From admission, onward)    Start     Stop Route Frequency Ordered    09/22/20 1402  insulin aspart U-100 pen 0-5 Units      -- SubQ Before meals & nightly PRN 09/22/20 1304        Hold Oral hypoglycemics while patient is in the hospital.        Persistent atrial fibrillation    Patient with controlled ventricular response.  Resume beta-blocker and Xarelto.    Secondary hyperparathyroidism  Chronic defer to Nephrology.  Resume vitamin-D analogs      Hyperlipidemia   Patient is chronically on statin.will continue for now. Monitor clinically. Last LDL was   Lab Results   Component Value Date    LDLCALC 46.4 (L) 07/26/2018          VTE Risk Mitigation (From admission, onward)         Ordered     Reason for No Pharmacological VTE Prophylaxis  Once     Question:  Reasons:  Answer:  Already adequately anticoagulated on oral Anticoagulants    09/22/20 1304     IP VTE HIGH RISK PATIENT  Once      09/22/20 1304                   Bell Wilkinson NP  Department of Hospital Medicine   [facility]

## 2020-09-22 NOTE — ED NOTES
Upon transfer to room 223, patient is AAO to self.No cardiac or respiratory complications. No needs or questions at this time.

## 2020-09-22 NOTE — ASSESSMENT & PLAN NOTE
Chronic, controlled.  Latest blood pressure and vitals reviewed-   Temp:  [97.6 °F (36.4 °C)]   Pulse:  [58-75]   Resp:  [16-20]   BP: (142-171)/(81-87)   SpO2:  [99 %-100 %] .   Home meds for hypertension were reviewed and noted below. Hospital anti-hypertensive changes were made as shown below.  Hypertension Medications             carvediloL (COREG) 12.5 MG tablet Take 6.5 tablets (81.25 mg total) by mouth 2 (two) times daily.      Hospital Medications             carvediloL tablet 78.125 mg 78.125 mg, Per G Tube, 2 times daily        Will utilize p.r.n. blood pressure medication only if patient's blood pressure greater than  180/110 and he develops symptoms such as worsening chest pain or shortness of breath.

## 2020-09-23 NOTE — CONSULTS
"  [facility]  Adult Nutrition  Consult Note    SUMMARY   Intervention: collaboration with care providers        Recommendation:  1)  initiate trickle TF/ pleasure PO feeds as soon as medically able per GI: TF Novasource renal @ 10 ml/hr  - advancing to goal of 50 ml/hr + 105  ml flush q 4 hr  (providing 2400 kcals (94% EEN), 108 g (100% EPN), and 864 ml free water)    2) When on diet add boost pudding BID ( low fat supplement)    3) If unable to resume diet/TF in < 4 days rec. Parenteral nutrition therapy and place PICC for TPN if needed  PPN D 5 AA 2.75 @ 85 ml/hr + standard lipids   (provides 56 g protein (54% EPN), 1070 kcal ( 41% EEN))     4) weigh pt weekly     Goals: 1)  TF initiated by f/u  Nutrition Goal Status: new  Communication of RD Recs: (POC, sticky note)     Reason for Assessment     Reason For Assessment: consult  Diagnosis: pancreatitis  Relevant Medical History: CHF, AFIB, anemia, obesity, HTN, HLD, DM2, ESRD on HD, dementia, stroke, PEG, COPD, CAD, gout  Interdisciplinary Rounds: attended     General Information Comments: 78 y/o male admitted with pancreatitis s/p 1 day PTA of abd. Pain and diarrhea. NFPE completed 9/32/20, mild wasting seen. Wt gain per chart review. PTA pt eats small amounts of pureed foods for pleasure and takes TF nocturnally via PEG. NO formula in chart, last admission tolerated Novasource renal. NPO x 1-2 days.     Nutrition Discharge Planning: to be determined- TF above + pleasure feeds     Nutrition Risk Screen     Nutrition Risk Screen: dysphagia or difficulty swallowing, tube feeding or parenteral nutrition     Nutrition/Diet History     Spiritual, Cultural Beliefs, Advent Practices, Values that Affect Care: no  Food Allergies: NKFA  Factors Affecting Nutritional Intake: dysphagia, abd. pain     Anthropometrics  Height Method: Measured(8/3/20 office)  Height: 6' 1"  Height (inches): 73 in  Weight Method: Bed Scale  Weight: 85.3 kg 9/22,   Weight " (lb): 188 lb  Ideal Body Weight (IBW), Male: 184 lb  BMI (Calculated): 22.8  BMI Grade: 18.5-24.9 - normal  Usual Body Weight (UBW), k.8 kg  78.5 kg (20)     Lab/Procedures/Meds     Pertinent Labs Reviewed: reviewed  BMP  Lab Results   Component Value Date     2020    K 5.0 2020     2020    CO2 21 (L) 2020    BUN 55 (H) 2020    CREATININE 7.2 (H) 2020    CALCIUM 8.9 2020    ANIONGAP 13 2020    ESTGFRAFRICA 8 (A) 2020    EGFRNONAA 7 (A) 2020     Lab Results   Component Value Date    CALCIUM 8.9 2020    PHOS 4.1 2020     Lab Results   Component Value Date    LIPASE 81 (H) 2020     Recent Labs   Lab 20  0519   POCTGLUCOSE 74       Pertinent Medications Reviewed: reviewed  Statin, insulin, zofran     Estimated/Assessed Needs     Weight Used For Calorie Calculations: 85 kg  Energy Calorie Requirements (kcal): 30-35 kcal/kg ( HD) = 3449-6926 kcal  Energy Need Method: Kcal/kg  Protein Requirements: 1.2-1.3 g protein/kg ( HD/wasting) = 102-110g protein  Weight Used For Protein Calculations: 85 kg  RDA Method (mL): 0726-2847 ml or per MD  CHO Requirement: 286-318 g        Nutrition Prescription Ordered     Current Diet Order: NPO x 1-2 days     Evaluation of Received Nutrient/Fluid Intake     Energy Calories Required: not meeting needs  Protein Required: not meeting needs  Fluid Required: not meeting needs  Tolerance: other (see comments)(NPO)  % Intake of Estimated Energy Needs:0%  % Meal Intake: NPO     Nutrition Risk     Level of Risk/Frequency of Follow-up: moderate 2 x weekly      Assessment and Plan     Inadequate energy intake  R/t NPO   As evidenced by PO intakes < 50% EEN x 1-2 day  Mild-moderate muscle/fat wasting as charted below  Intervention: above  New        Monitor and Evaluation     Food and Nutrient Intake: energy intake  Food and Nutrient Adminstration: diet order, enteral and parenteral nutrition  administration  Anthropometric Measurements: weight  Biochemical Data, Medical Tests and Procedures: electrolyte and renal panel, gastrointestinal profile, glucose/endocrine profile, lipase  Nutrition-Focused Physical Findings: overall appearance     Malnutrition Assessment  Skin (Micronutrient): (Patrick = 15, no PI noted)  Orbital Region (Subcutaneous Fat Loss): mild depletion  [place] Region (Muscle Loss): mild depletion  Clavicle Bone Region (Muscle Loss): mild depletion  Clavicle and Acromion Bone Region (Muscle Loss): mild depletion  Scapular Bone Region (Muscle Loss): (Unable to obtain)  Dorsal Hand (Muscle Loss): mild depletion  Patellar Region (Muscle Loss): mild depletion  Posterior Calf Region (Muscle Loss): milddepletion   Edema (Fluid Accumulation): 0-->no edema present       Nutrition Follow-Up     RD Follow-up?: Yes

## 2020-09-23 NOTE — ASSESSMENT & PLAN NOTE
Trend lipase.  Consult GI.  Pain control.  Lab Results   Component Value Date    LIPASE 81 (H) 09/23/2020     Patient with no epigastric tenderness. CT renal negative. Suspect musculoskeletal pain. Add norco prn and Toradol x 1

## 2020-09-23 NOTE — PLAN OF CARE
09/23/20 1038   GARRETT Message   Medicare Outpatient and Observation Notification regarding financial responsibility Given to patient/caregiver;Explained to patient/caregiver;Signed/date by patient/caregiver   Date GARRETT was signed 09/23/20   Time GARRETT was signed 1038

## 2020-09-23 NOTE — PT/OT/SLP EVAL
"Physical Therapy Evaluation    Patient Name:  Micheal Hunt   MRN:  [id number]    Recommendations:     Discharge Recommendations:  home health PT   Discharge Equipment Recommendations: none   Barriers to discharge: None    Assessment:     Micheal Hunt is a 79 y.o. male admitted with a medical diagnosis of Pancreatitis.  He presents with the following impairments/functional limitations:  weakness, impaired endurance, impaired self care skills, impaired functional mobilty, gait instability, decreased lower extremity function, pain, decreased safety awareness. PT eval and treat. Pt presents with R sided hemiparesis. Pt performed thera ex x10 AP, assisted SLR. Pt able to take 5 steps mod A with hand-held assist - another person for safety. Pt OOB to chair post PT. HHPT recommended.     Rehab Prognosis: Fair; patient would benefit from acute skilled PT services to address these deficits and reach maximum level of function.    Recent Surgery: * No surgery found *      Plan:     During this hospitalization, patient to be seen 6 x/week to address the identified rehab impairments via gait training, therapeutic exercises, therapeutic activities and progress toward the following goals:    · Plan of Care Expires:  10/30/20    Subjective   Pt with frequent complaints of being cold  Pt complaining of L hip pain - nurse Katty notified  Pt reported 12 hr/day, 7 days/wk caregiver service  Pt reporting "I used to walk all the time with my walker"  Pt stating "I can't see without my glasses", stating "they left them at home when they took me in the ambulance"    Chief Complaint: L hip pain  Patient/Family Comments/goals: "stop hurting"  Pain/Comfort:  Pain Rating 1: 10/10  Location - Side 1: Left  Location 1: hip  Pain Addressed 1: Pre-medicate for activity, Reposition, Distraction, Nurse notified    Patients cultural, spiritual, Moravian conflicts given the current situation:      Living Environment:  Home with 12 " hours/day, 7 days/week caregiver service but alone at night. Pt lives in house beside his daughter - pt reporting that his daughter is available when pt needs assist    Prior to admission, patients level of function was assist for all mobility.  Equipment used at home: wheelchair, walker, rolling.  DME owned (not currently used): none.  Upon discharge, patient will have assistance from caregivers and family.    Objective:     Communicated with nurse Alaniz prior to session.  Patient found HOB elevated with bed alarm, telemetry, PEG Tube  upon PT entry to room.    General Precautions: Standard, fall, seizure, NPO   Orthopedic Precautions:N/A   Braces: N/A     Exams:  · Postural Exam:  Patient presented with the following abnormalities:    · -       Rounded shoulders  · -       Forward head  · RLE ROM: WFL  · RLE Strength: Deficits: 3/5  · LLE ROM: WFL  · LLE Strength: WFL    Functional Mobility:  · Bed Mobility:     · Rolling Right: minimum assistance  · Scooting: minimum assistance  · Supine to Sit: moderate assistance  · Transfers:     · Sit to Stand:  moderate assistance with hand-held assist  · Bed to Chair: moderate assistance with  hand-held assist  using  Stand Pivot - another person for safety   · Gait: Pt able to take 5 steps modA with hand-held assist - another person for safety  · Balance: EOB sitting balance: Good    Therapeutic Activities and Exercises:   Patient was educated on the importance of OOB activity and functional mobility to negate negative effects of prolonged bed rest during hospitalization, safe transfers and ambulation, and D/C planning     Thera ex x10 AP and assisted SLR  Brief EOB sitting  Pt OOB to chair post PT    AM-PAC 6 CLICK MOBILITY  Total Score:16     Patient left up in chair with all lines intact, call button in reach, chair alarm on and nurse Alaniz present.    GOALS:   Multidisciplinary Problems     Physical Therapy Goals        Problem: Physical Therapy Goal    Goal Priority  Disciplines Outcome Goal Variances Interventions   Physical Therapy Goal     PT, PT/OT Ongoing, Progressing     Description: Goals to be met by: 10/30/20     Patient will increase functional independence with mobility by performin. Supine to sit with MInimal Assistance  2. Sit to stand transfer with Minimal Assistance  3. Bed to chair transfer with Minimal Assistance using hand-held assist  4. Gait  x 50 feet with Minimal Assistance using Rolling Walker.   5. Lower extremity exercise program x20 reps                     History:     Past Medical History:   Diagnosis Date    NICK (acute kidney injury) 2016    Allergy     Anemia, mild 12/15/2014    Arthritis     Gout    Atrial fibrillation 2019    Benign essential HTN 3/27/2012    BMI 29.0-29.9,adult 5/10/2018    BPH (benign prostatic hyperplasia)     BPH (benign prostatic hyperplasia)     CAD (coronary artery disease)     Carotid artery occlusion     60-70% FROYLAN, LICA < 50%    Chronic kidney disease     due to ibuprofen    Colon polyp     CRF (chronic renal failure), stage 5     Diabetes mellitus     Diverticulosis     ESRD (end stage renal disease) on dialysis     Gastritis     GERD (gastroesophageal reflux disease)     Gout     History of colon polyps 5/3/2018    HTN (hypertension) 3/27/2012    Hyperlipidemia     Hyperlipidemia     LLL pneumonia 2018    LVH (left ventricular hypertrophy)     Mesenteric ischemia     Murmur, cardiac 3/27/2012    GRICELDA (obstructive sleep apnea)     DOES NOT USE A MACHINE    Sinus problem     Stroke 2019    acute left PCA    Syncope and collapse        Past Surgical History:   Procedure Laterality Date    APPENDECTOMY      CARDIAC CATHETERIZATION      LIMA to LAD patent, SVG to RCA    COLONOSCOPY      COLONOSCOPY N/A 5/3/2018    Procedure: COLONOSCOPY;  Surgeon: Messi Harris MD;  Location: [facility];  Service: Endoscopy;  Laterality: N/A;    COLONOSCOPY N/A  8/18/2020    Procedure: COLONOSCOPY;  Surgeon: Messi Harris MD;  Location: [facility];  Service: Endoscopy;  Laterality: N/A;    CORONARY ARTERY BYPASS GRAFT  4/2007    x 2 [place]    ESOPHAGOGASTRODUODENOSCOPY N/A 8/17/2020    Procedure: EGD (ESOPHAGOGASTRODUODENOSCOPY);  Surgeon: Eben Soto MD;  Location: [facility] END[facility];  Service: Endoscopy;  Laterality: N/A;    JOINT REPLACEMENT      left knee total replacement  X 3    mid leftt finger      from a cactuss    MOLE REMOVAL  2016    rotative cuff      no rotative cuffs on bilat shoulders has pins     SHOULDER SURGERY      shoulder surgery bilat  RIGHT X 4; LEFT X 3       Time Tracking:     PT Received On: 09/23/20  PT Start Time: 1040     PT Stop Time: 1057  PT Total Time (min): 17 min     Billable Minutes: Evaluation 8 and Gait Training 9      GOKUL Koenig  09/23/2020

## 2020-09-23 NOTE — PLAN OF CARE
Problem: Physical Therapy Goal  Goal: Physical Therapy Goal  Description: Goals to be met by: 10/30/20     Patient will increase functional independence with mobility by performin. Supine to sit with MInimal Assistance  2. Sit to stand transfer with Minimal Assistance  3. Bed to chair transfer with Minimal Assistance using hand-held assist  4. Gait  x 50 feet with Minimal Assistance using Rolling Walker.   5. Lower extremity exercise program x20 reps    Outcome: Ongoing, Progressing  PT eval and treat. Pt performed thera ex x10 AP and assisted SLR. Pt able to take 5 steps mod A with hand-held assist - another person for safety. Pt OOB to chair post PT. HHPT recommended.

## 2020-09-23 NOTE — UM SECONDARY REVIEW
Physician Advisor External    Level of Care Issue    9/23 @ 2:26PM Case submitted to EHR for review. Kv    9/23 @ 4:00pm Per voicemail from EHR Dr Ely recommends OP for 9/22 and OP for 9/23. kv

## 2020-09-23 NOTE — CONSULTS
INPATIENT NEPHROLOGY CONSULT   [facility] NEPHROLOGY    Micheal Hunt  09/23/2020    Reason for consultation:      esrd    Chief Complaint:   Chief Complaint   Patient presents with    Abdominal Pain     left lower abdominal pain          History of Present Illness:    Per H and P    Micheal Hunt is a 79 y.o. male with PMH of CVA with left-sided deficits, ESRD on dialysis, and a.fib on eliquia, seizures, hypertension and hyperlipidemia who presents to the ED for evaluation of left lower quadrant abdominal pain  5/10 sharp achy pain associated with diarrhea since yesterday.  He reports pain increases with movement alleviates with rest.  His caretaker states he began having diarrhea yesterday with 4 episodes during the day.  She states that he is on tube feedings due to dysphagia although he occasionally has soft foods by mouth.     9/23 seen on dialysis.  No distress.  Aphasic      Plan of Care:       Assessment:    esrd--seen on dialysis  --continue dialysis per routine  --fluid restrict  --renal dose medication per routine  --continue outpt medication  --continue binders with meals    Anemia  --erythropoiesis stimulating agent with renal replacement therapy    Mild acidosis  --35 bicarb bath    Hypertension  --uf to dry weight  --continue Coreg       Thank you for allowing us to participate in this patient's care. We will continue to follow.    Vital Signs:  Temp Readings from Last 3 Encounters:   09/23/20 97.6 °F (36.4 °C) (Oral)   09/01/20 98.7 °F (37.1 °C) (Skin)   08/24/20 97.4 °F (36.3 °C)       Pulse Readings from Last 3 Encounters:   09/23/20 (!) 58   09/01/20 63   08/24/20 61       BP Readings from Last 3 Encounters:   09/23/20 126/71   09/01/20 120/70   08/24/20 134/68       Weight:  Wt Readings from Last 3 Encounters:   09/22/20 85.3 kg (188 lb)   09/01/20 85.7 kg (188 lb 15 oz)   08/23/20 82.5 kg (181 lb 14.1 oz)       Past Medical & Surgical History:  Past Medical History:   Diagnosis  Date    NICK (acute kidney injury) 7/29/2016    Allergy     Anemia, mild 12/15/2014    Arthritis     Gout    Atrial fibrillation 03/2019    Benign essential HTN 3/27/2012    BMI 29.0-29.9,adult 5/10/2018    BPH (benign prostatic hyperplasia)     BPH (benign prostatic hyperplasia)     CAD (coronary artery disease) 2006    Carotid artery occlusion     60-70% FROYLAN, LICA < 50%    Chronic kidney disease     due to ibuprofen    Colon polyp     CRF (chronic renal failure), stage 5     Diabetes mellitus     Diverticulosis     ESRD (end stage renal disease) on dialysis     Gastritis     GERD (gastroesophageal reflux disease)     Gout     History of colon polyps 5/3/2018    HTN (hypertension) 3/27/2012    Hyperlipidemia     Hyperlipidemia     LLL pneumonia 6/14/2018    LVH (left ventricular hypertrophy)     Mesenteric ischemia     Murmur, cardiac 3/27/2012    GRICELDA (obstructive sleep apnea)     DOES NOT USE A MACHINE    Sinus problem     Stroke 03/2019    acute left PCA    Syncope and collapse        Past Surgical History:   Procedure Laterality Date    APPENDECTOMY      CARDIAC CATHETERIZATION  2012    LIMA to LAD patent, SVG to RCA    COLONOSCOPY  2011    COLONOSCOPY N/A 5/3/2018    Procedure: COLONOSCOPY;  Surgeon: Messi Harris MD;  Location: [facility] ENDO;  Service: Endoscopy;  Laterality: N/A;    COLONOSCOPY N/A 8/18/2020    Procedure: COLONOSCOPY;  Surgeon: Messi Harris MD;  Location: [facility] END[facility];  Service: Endoscopy;  Laterality: N/A;    CORONARY ARTERY BYPASS GRAFT  4/2007    x 2 [place]    ESOPHAGOGASTRODUODENOSCOPY N/A 8/17/2020    Procedure: EGD (ESOPHAGOGASTRODUODENOSCOPY);  Surgeon: Eben Soto MD;  Location: [facility] ENDO;  Service: Endoscopy;  Laterality: N/A;    JOINT REPLACEMENT      left knee total replacement  X 3    mid leftt finger      from a cactuss    MOLE REMOVAL  2016    rotative cuff      no rotative cuffs on bilat shoulders has pins     SHOULDER  SURGERY      shoulder surgery bilat  RIGHT X 4; LEFT X 3       Past Social History:  Social History     Socioeconomic History    Marital status:      Spouse name: Not on file    Number of children: Not on file    Years of education: Not on file    Highest education level: Not on file   Occupational History    Not on file   Social Needs    Financial resource strain: Somewhat hard    Food insecurity     Worry: Often true     Inability: Often true    Transportation needs     Medical: Yes     Non-medical: Yes   Tobacco Use    Smoking status: Never Smoker    Smokeless tobacco: Never Used   Substance and Sexual Activity    Alcohol use: No     Frequency: Never     Drinks per session: Patient refused     Binge frequency: Patient refused    Drug use: No    Sexual activity: Not on file   Lifestyle    Physical activity     Days per week: Not on file     Minutes per session: Not on file    Stress: Not on file   Relationships    Social connections     Talks on phone: More than three times a week     Gets together: Three times a week     Attends Hoahaoism service: Not on file     Active member of club or organization: Patient refused     Attends meetings of clubs or organizations: Patient refused     Relationship status:    Other Topics Concern    Not on file   Social History Narrative    [place]; daughter nearby       Medications:  No current facility-administered medications on file prior to encounter.      Current Outpatient Medications on File Prior to Encounter   Medication Sig Dispense Refill    allopurinoL (ZYLOPRIM) 100 MG tablet 100 mg by FEEDING TUBE route once daily.      apixaban (ELIQUIS) 2.5 mg Tab 2.5 mg by FEEDING TUBE route 2 (two) times daily.      aspirin (ECOTRIN) 81 MG EC tablet 81 mg by FEEDING TUBE route once daily.      atorvastatin (LIPITOR) 40 MG tablet 1 tablet (40 mg total) by Per G Tube route once daily. 90 tablet 4    carvediloL (COREG) 12.5 MG tablet  Take 6.5 tablets (81.25 mg total) by mouth 2 (two) times daily. (Patient taking differently: 12.5 mg by Per G Tube route once daily. ) 90 tablet 4    levETIRAcetam (KEPPRA) 100 mg/mL Soln 7.5 ml by PEG daily (Patient taking differently: 750 mg by Per G Tube route once daily. 7.5 ml by PEG daily) 120 mL 5    nut.tx.imp.renal fxn,lac-reduc (NEPRO CARB STEADY ORAL) 240 mLs by FEEDING TUBE route 2 (two) times daily. 6 hours apart AM and afternoon      nut.tx.imp.renal fxn,lac-reduc (NEPRO CARB STEADY ORAL) 1,000 mLs by FEEDING TUBE route every evening.       senna-docusate 8.6-50 mg (PERICOLACE) 8.6-50 mg per tablet 1 tablet by Per G Tube route once daily.       vit b cmplx 3-fa-vit c-biotin 1- mg-mg-mcg (NEPHRO-EMMETT RX) 1- mg-mg-mcg Tab Take 1 tablet by mouth once daily. (Patient taking differently: 1 tablet by Per G Tube route once daily. ) 90 tablet 3     Scheduled Meds:   allopurinoL  100 mg Per G Tube Daily    apixaban  2.5 mg Per G Tube BID    aspirin  81 mg Oral Daily    atorvastatin  40 mg Per G Tube Daily    carvediloL  12.5 mg Per G Tube Daily    gabapentin  100 mg Oral BID    ketorolac  15 mg Intravenous Once    levetiracetam oral soln  750 mg Per G Tube Daily    lidocaine  1 patch Transdermal Q24H    mupirocin   Nasal BID     Continuous Infusions:  PRN Meds:.dextrose 50%, dextrose 50%, glucagon (human recombinant), glucose, glucose, HYDROcodone-acetaminophen, influenza, insulin aspart U-100, magnesium oxide, magnesium oxide, morphine, ondansetron    Allergies:  Ace inhibitors, Angiotensin ii, Arb-angiotensin receptor antagonist, Eplerenone, and Sulfa (sulfonamide antibiotics)    Past Family History:  Reviewed; refer to Hospitalist Admission Note    Review of Systems:  Review of Systems - All 14 systems reviewed and negative, except as noted in HPI    Physical Exam:    /71 (BP Location: Right arm, Patient Position: Lying)   Pulse (!) 58   Temp 97.6 °F (36.4 °C) (Oral)    Resp 16   Wt 85.3 kg (188 lb)   SpO2 98%   BMI 24.80 kg/m²     General Appearance:    NAD   Head:    Normocephalic, without obvious abnormality, atraumatic   Eyes:    PER, conjunctiva/corneas clear, EOM's intact in both eyes        Throat:   Lips, mucosa, and tongue normal; teeth and gums normal   Back:     Symmetric, no curvature, ROM normal, no CVA tenderness   Lungs:     Clear to auscultation bilaterally, respirations unlabored   Chest wall:    No tenderness or deformity   Heart:    Regular rate and rhythm, S1 and S2 normal, no murmur, rub   or gallop   Abdomen:     Soft, non-tender, bowel sounds active all four quadrants,     no masses, no organomegaly   Extremities:   Extremities normal, atraumatic, no cyanosis or edema   Pulses:   2+ and symmetric all extremities   MSK:   No joint or muscle swelling, tenderness or deformity   Skin:   Skin color, texture, turgor normal, no rashes or lesions   Neurologic:   Expressive aphasia      Results:  Lab Results   Component Value Date     09/23/2020    K 5.0 09/23/2020     09/23/2020    CO2 21 (L) 09/23/2020    BUN 55 (H) 09/23/2020    CREATININE 7.2 (H) 09/23/2020    CALCIUM 8.9 09/23/2020    ANIONGAP 13 09/23/2020    ESTGFRAFRICA 8 (A) 09/23/2020    EGFRNONAA 7 (A) 09/23/2020       Lab Results   Component Value Date    CALCIUM 8.9 09/23/2020    PHOS 4.1 08/24/2020       No results for input(s): WBC, RBC, HGB, HCT, PLT, MCV, MCH, MCHC in the last 24 hours.       Frankie Rojo MD  Nephrology  [facility] [place]  [phone]         I have personally reviewed pertinent radiological imaging and reports.

## 2020-09-23 NOTE — SUBJECTIVE & OBJECTIVE
Interval History:  Patient with persistent left lower quadrant pain radiating to the left flank.  Increases with movement.  Patient on denies recent falls.      Review of Systems   Unable to perform ROS: Other (aphasic)   Respiratory: Negative for cough and shortness of breath.    Gastrointestinal: Positive for abdominal pain (LLQ tenderness). Negative for constipation and nausea.   Genitourinary: Positive for flank pain. Negative for hematuria.   Musculoskeletal: Positive for back pain and gait problem. Negative for arthralgias.   Neurological: Positive for facial asymmetry, speech difficulty and weakness (right side).     Objective:     Vital Signs (Most Recent):  Temp: 97.3 °F (36.3 °C) (09/23/20 1101)  Pulse: 68 (09/23/20 1101)  Resp: 17 (09/23/20 1101)  BP: 137/85 (09/23/20 1101)  SpO2: 98 % (09/23/20 1101) Vital Signs (24h Range):  Temp:  [97 °F (36.1 °C)-97.8 °F (36.6 °C)] 97.3 °F (36.3 °C)  Pulse:  [66-79] 68  Resp:  [12-19] 17  SpO2:  [97 %-100 %] 98 %  BP: (137-183)/(73-90) 137/85     Weight: 85.3 kg (188 lb)  Body mass index is 24.8 kg/m².  No intake or output data in the 24 hours ending 09/23/20 1315   Physical Exam  Vitals signs and nursing note reviewed.   Constitutional:       Appearance: Normal appearance. He is well-developed.   HENT:      Nose: Nose normal.      Mouth/Throat:      Mouth: Mucous membranes are moist.   Eyes:      Conjunctiva/sclera: Conjunctivae normal.      Pupils: Pupils are equal, round, and reactive to light.   Cardiovascular:      Rate and Rhythm: Normal rate. Rhythm irregular.      Heart sounds: Normal heart sounds.   Pulmonary:      Effort: Pulmonary effort is normal.      Breath sounds: Normal breath sounds.   Abdominal:      General: Bowel sounds are normal. There is no distension.      Palpations: Abdomen is soft.      Tenderness: There is abdominal tenderness.      Hernia: Hernia: LLQ tenderness.   Musculoskeletal: Normal range of motion.      Comments: + left flank  tenderness with palpation    Skin:     General: Skin is warm and dry.   Neurological:      General: No focal deficit present.      Mental Status: He is alert and oriented to person, place, and time. Mental status is at baseline.      Motor: Weakness (right sided hemiparesis ) present.      Comments: Aphasia, minimally converses   Psychiatric:         Mood and Affect: Mood normal.         Behavior: Behavior normal.         Significant Labs:   BMP:   Recent Labs   Lab 09/23/20  0518   GLU 78      K 5.0      CO2 21*   BUN 55*   CREATININE 7.2*   CALCIUM 8.9     CBC:   Recent Labs   Lab 09/22/20  1012   WBC 7.38   HGB 11.8*   HCT 36.0*          Significant Imaging: I have reviewed and interpreted all pertinent imaging results/findings within the past 24 hours.

## 2020-09-23 NOTE — PLAN OF CARE
Intervention: collaboration with care providers        Recommendation:  1)  initiate trickle TF/ pleasure PO feeds as soon as medically able per GI: TF Novasource renal @ 10 ml/hr  - advancing to goal of 50 ml/hr + 105  ml flush q 4 hr  (providing 2400 kcals (94% EEN), 108 g (100% EPN), and 864 ml free water)    2) When on diet add boost pudding BID ( low fat supplement)    3) If unable to resume diet/TF in < 4 days rec. Parenteral nutrition therapy and place PICC for TPN if needed  PPN D 5 AA 2.75 @ 85 ml/hr + standard lipids   (provides 56 g protein (54% EPN), 1070 kcal ( 41% EEN))     4) weigh pt weekly     Goals: 1)  TF initiated by f/u  Nutrition Goal Status: new  Communication of RD Recs: (POC, sticky note)

## 2020-09-23 NOTE — PLAN OF CARE
FARSHAD attempted to complete assessment w/ pt, pt able to verify his , had difficulty recalling other info, was not able to identify where he was.    FARSHAD contacted pt's daughter, Tonya Hunt [phone]--she reports she has POA and has provided documentation of this in 2018.  She reports pt lives alone and has a sitter 7days/week for 12 hours (overnight).  Pt active w/ [facility], prefers to resume w/ same provider.  Verbal consent given for disclosure form.  Pt on outpt HD MWF and pt uses the DME below.  [place] [facility]  SW informed daughter of outpt status for Medicare (MOON), she provided consent to indicate same on the MOON form.         20 1043   Discharge Assessment   Assessment Type Discharge Planning Assessment   Confirmed/corrected address and phone number on facesheet? Yes   Assessment information obtained from? Patient;Caregiver;Medical Record   Prior to hospitilization cognitive status: Alert/Oriented   Prior to hospitalization functional status: Assistive Equipment;Needs Assistance   Current cognitive status: Not Oriented to Place;Not Oriented to Time   Current Functional Status: Needs Assistance;Assistive Equipment   Facility Arrived From: home w/ home health   Lives With alone   Able to Return to Prior Arrangements yes   Is patient able to care for self after discharge? Unable to determine at this time (comments)   Who are your caregiver(s) and their phone number(s)? daughter:  Tonya Hunt (POA)  [phone]   Patient's perception of discharge disposition home health   Readmission Within the Last 30 Days no previous admission in last 30 days   Patient currently being followed by outpatient case management? No   Patient currently receives any other outside agency services? Yes   Name and contact number of agency or person providing outside services [facility];   private sitter 12hrs per day (8pm-8am)   Is it the patient/care giver preference to  resume care with the current outside agency? Yes   Equipment Currently Used at Home raised toilet;feeding device;lift device;wheelchair;bedside commode;walker, nikita   Part D Coverage Yes   Do you have any problems affording any of your prescribed medications? No   Is the patient taking medications as prescribed? yes   Does the patient have transportation home? Yes   Transportation Anticipated family or friend will provide   Dialysis Name and Scheduled days Outpt HD  MWF   Does the patient receive services at the [facility]? No   Discharge Plan A Home Health   Discharge Plan B Home Health   DME Needed Upon Discharge  none   Patient/Family in Agreement with Plan yes

## 2020-09-23 NOTE — PROGRESS NOTES
[facility] Hospital Medicine  Progress Note    Patient Name: Micheal Hunt  MRN: [id number]  Patient Class: OP- Observation   Admission Date: 9/22/2020  Length of Stay: 0 days  Attending Physician: Raymundo Ball MD  Primary Care Provider: Pk Lakhani MD        Subjective:     Principal Problem:Pancreatitis        HPI:  Micheal Hunt is a 79 y.o. male with PMH of CVA with left-sided deficits, ESRD on dialysis, and a.fib on eliquia, seizures, hypertension and hyperlipidemia who presents to the ED for evaluation of left lower quadrant abdominal pain  5/10 sharp achy pain associated with diarrhea since yesterday.  He reports pain increases with movement alleviates with rest.  His caretaker states he began having diarrhea yesterday with 4 episodes during the day.  She states that he is on tube feedings due to dysphagia although he occasionally has soft foods by mouth.  Patient currently receives hemodialysis Monday Wednesday and Friday he did have dialysis yesterday.  Patient is a poor historian and minimally conversive therefore information is gathered from his caretaker.       Overview/Hospital Course:  No notes on file    Interval History:  Patient with persistent left lower quadrant pain radiating to the left flank.  Increases with movement.  Patient on denies recent falls.      Review of Systems   Unable to perform ROS: Other (aphasic)   Respiratory: Negative for cough and shortness of breath.    Gastrointestinal: Positive for abdominal pain (LLQ tenderness). Negative for constipation and nausea.   Genitourinary: Positive for flank pain. Negative for hematuria.   Musculoskeletal: Positive for back pain and gait problem. Negative for arthralgias.   Neurological: Positive for facial asymmetry, speech difficulty and weakness (right side).     Objective:     Vital Signs (Most Recent):  Temp: 97.3 °F (36.3 °C) (09/23/20 1101)  Pulse: 68 (09/23/20 1101)  Resp: 17 (09/23/20  1101)  BP: 137/85 (09/23/20 1101)  SpO2: 98 % (09/23/20 1101) Vital Signs (24h Range):  Temp:  [97 °F (36.1 °C)-97.8 °F (36.6 °C)] 97.3 °F (36.3 °C)  Pulse:  [66-79] 68  Resp:  [12-19] 17  SpO2:  [97 %-100 %] 98 %  BP: (137-183)/(73-90) 137/85     Weight: 85.3 kg (188 lb)  Body mass index is 24.8 kg/m².  No intake or output data in the 24 hours ending 09/23/20 1315   Physical Exam  Vitals signs and nursing note reviewed.   Constitutional:       Appearance: Normal appearance. He is well-developed.   HENT:      Nose: Nose normal.      Mouth/Throat:      Mouth: Mucous membranes are moist.   Eyes:      Conjunctiva/sclera: Conjunctivae normal.      Pupils: Pupils are equal, round, and reactive to light.   Cardiovascular:      Rate and Rhythm: Normal rate. Rhythm irregular.      Heart sounds: Normal heart sounds.   Pulmonary:      Effort: Pulmonary effort is normal.      Breath sounds: Normal breath sounds.   Abdominal:      General: Bowel sounds are normal. There is no distension.      Palpations: Abdomen is soft.      Tenderness: There is abdominal tenderness.      Hernia: Hernia: LLQ tenderness.   Musculoskeletal: Normal range of motion.      Comments: + left flank tenderness with palpation    Skin:     General: Skin is warm and dry.   Neurological:      General: No focal deficit present.      Mental Status: He is alert and oriented to person, place, and time. Mental status is at baseline.      Motor: Weakness (right sided hemiparesis ) present.      Comments: Aphasia, minimally converses   Psychiatric:         Mood and Affect: Mood normal.         Behavior: Behavior normal.         Significant Labs:   BMP:   Recent Labs   Lab 09/23/20  0518   GLU 78      K 5.0      CO2 21*   BUN 55*   CREATININE 7.2*   CALCIUM 8.9     CBC:   Recent Labs   Lab 09/22/20  1012   WBC 7.38   HGB 11.8*   HCT 36.0*          Significant Imaging: I have reviewed and interpreted all pertinent imaging results/findings within  the past 24 hours.      Assessment/Plan:      * Pancreatitis  Trend lipase.  Consult GI.  Pain control.  Lab Results   Component Value Date    LIPASE 81 (H) 09/23/2020     Patient with no epigastric tenderness. CT renal negative. Suspect musculoskeletal pain. Add norco prn and Toradol x 1    PVD (peripheral vascular disease)  Patient with severe arthrosclerosis noted to carotid arteries, aortic atherosclerosis and mesenteric atherosclerosis.  Continue statin.      Hemiparesis, aphasia, and dysphagia as late effects of stroke  Patient with right-sided Hemiparesis expressive aphasia and dysphagia.  Continue tube feedings.  Consult dietary for recommendations.      Seizure    Resume home Keppra.  Seizure precautions.    PEG (percutaneous endoscopic gastrostomy) status  Care per nursing      ESRD (end stage renal disease) on dialysis  Creatine stable for now. BMP reviewed- noted Estimated Creatinine Clearance: 10.7 mL/min (A) (based on SCr of 6.2 mg/dL (H)). according to latest data. Monitor UOP and serial BMP and adjust therapy as needed. Renally dose meds.      Consult nephrology.  Patient on hemodialysis Monday Wednesday and Friday.      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  Latest blood pressure and vitals reviewed-   Temp:  [97.6 °F (36.4 °C)]   Pulse:  [58-75]   Resp:  [16-20]   BP: (142-171)/(81-87)   SpO2:  [99 %-100 %] .   Home meds for hypertension were reviewed and noted below. Hospital anti-hypertensive changes were made as shown below.  Hypertension Medications             carvediloL (COREG) 12.5 MG tablet Take 6.5 tablets (81.25 mg total) by mouth 2 (two) times daily.      Hospital Medications             carvediloL tablet 78.125 mg 78.125 mg, Per G Tube, 2 times daily        Will utilize p.r.n. blood pressure medication only if patient's blood pressure greater than  180/110 and he develops symptoms such as worsening chest pain or  shortness of breath.        Type 2 diabetes mellitus with renal complication  Patient's FSGs are controlled on current hypoglycemics.   Last A1c reviewed-   Lab Results   Component Value Date    HGBA1C 8.3 (H) 03/21/2019     Most recent fingerstick glucose reviewed- No results for input(s): POCTGLUCOSE in the last 24 hours.  Current correctional scale  Low  Maintain anti-hyperglycemic dose as follows-   Antihyperglycemics (From admission, onward)    Start     Stop Route Frequency Ordered    09/22/20 1402  insulin aspart U-100 pen 0-5 Units      -- SubQ Before meals & nightly PRN 09/22/20 1304        Hold Oral hypoglycemics while patient is in the hospital.        Persistent atrial fibrillation    Patient with controlled ventricular response.  Resume beta-blocker and Xarelto.     Secondary hyperparathyroidism  Chronic defer to Nephrology.  Resume vitamin-D analogs      Hyperlipidemia   Patient is chronically on statin.will continue for now. Monitor clinically. Last LDL was   Lab Results   Component Value Date    LDLCALC 46.4 (L) 07/26/2018        VTE Risk Mitigation (From admission, onward)         Ordered     apixaban tablet 2.5 mg  2 times daily      09/23/20 0810     Reason for No Pharmacological VTE Prophylaxis  Once     Question:  Reasons:  Answer:  Already adequately anticoagulated on oral Anticoagulants    09/22/20 1304     IP VTE HIGH RISK PATIENT  Once      09/22/20 1304                Discharge Planning   MARKELL:      Code Status: DNR   Is the patient medically ready for discharge?:     Reason for patient still in hospital (select all that apply): Patient trending condition, Laboratory test, Treatment and Consult recommendations  Discharge Plan A: Home Health          Addendum: 1500  Updated daughter on plan of care reviewed imaging results.  Plan for discharge in the morning.  She will arrange transportation.        Bell Wilkinson NP  Department of Hospital Medicine   [facility]

## 2020-09-24 NOTE — CONSULTS
[facility]     CC: Elevated lipase    HPI 79 y.o. male with elevated lipase, recent onset, mild in severity, without imaging findings or symptoms consistent with pancreatitis, with no alleviating/exacerbating factors.  Patient is known to our service and has history of dementia, CVA, ESRD on HD, and PEG tube placement.  Case discussed with primary team NP.  Patient had some vague LLQ pain prior to admission and had CT scan done.  CT reviewed and showed PEG in place as well as diffuse atherosclerosis.  Patient was receiving HD today when seen and had no acute complaints.  He had recent EGD and colonoscopy secondary to GI bleeding which was likely secondary to diverticular source.  No bleeding currently.  Patient tolerating PO.  Per NP, patient did admit to one day of LLQ pain and diarrhea on admission but this appears to be improved and exam is benign.      Past Medical History:   Diagnosis Date    NICK (acute kidney injury) 7/29/2016    Allergy     Anemia, mild 12/15/2014    Arthritis     Gout    Atrial fibrillation 03/2019    Benign essential HTN 3/27/2012    BMI 29.0-29.9,adult 5/10/2018    BPH (benign prostatic hyperplasia)     BPH (benign prostatic hyperplasia)     CAD (coronary artery disease) 2006    Carotid artery occlusion     60-70% FROYLAN, LICA < 50%    Chronic kidney disease     due to ibuprofen    Colon polyp     CRF (chronic renal failure), stage 5     Diabetes mellitus     Diverticulosis     ESRD (end stage renal disease) on dialysis     Gastritis     GERD (gastroesophageal reflux disease)     Gout     History of colon polyps 5/3/2018    HTN (hypertension) 3/27/2012    Hyperlipidemia     Hyperlipidemia     LLL pneumonia 6/14/2018    LVH (left ventricular hypertrophy)     Mesenteric ischemia     Murmur, cardiac 3/27/2012    GRICELDA (obstructive sleep apnea)     DOES NOT USE A MACHINE    Sinus problem     Stroke 03/2019    acute left PCA    Syncope and collapse         Past Surgical History:   Procedure Laterality Date    APPENDECTOMY      CARDIAC CATHETERIZATION      LIMA to LAD patent, SVG to RCA    COLONOSCOPY      COLONOSCOPY N/A 5/3/2018    Procedure: COLONOSCOPY;  Surgeon: Messi Harris MD;  Location: [facility] ENDO;  Service: Endoscopy;  Laterality: N/A;    COLONOSCOPY N/A 2020    Procedure: COLONOSCOPY;  Surgeon: Messi Harris MD;  Location: [facility] ENDO;  Service: Endoscopy;  Laterality: N/A;    CORONARY ARTERY BYPASS GRAFT  4/2007    x 2 [place]    ESOPHAGOGASTRODUODENOSCOPY N/A 2020    Procedure: EGD (ESOPHAGOGASTRODUODENOSCOPY);  Surgeon: Eben Soto MD;  Location: [facility] ENDO;  Service: Endoscopy;  Laterality: N/A;    JOINT REPLACEMENT      left knee total replacement  X 3    mid leftt finger      from a cactuss    MOLE REMOVAL      rotative cuff      no rotative cuffs on bilat shoulders has pins     SHOULDER SURGERY      shoulder surgery bilat  RIGHT X 4; LEFT X 3       Social History     Tobacco Use    Smoking status: Never Smoker    Smokeless tobacco: Never Used   Substance Use Topics    Alcohol use: No     Frequency: Never     Drinks per session: Patient refused     Binge frequency: Patient refused    Drug use: No       Family History   Problem Relation Age of Onset    Heart disease Mother     Sudden death Father     Cancer Father         advanced lung ca- found after 2 story fall    Stroke Sister     Pneumonia Sister          from PNA    Heart disease Sister     Hypertension Brother     Kidney cancer Neg Hx     Prostate cancer Neg Hx     Urolithiasis Neg Hx     Allergic rhinitis Neg Hx     Allergies Neg Hx     Angioedema Neg Hx     Asthma Neg Hx     Atopy Neg Hx     Eczema Neg Hx     Immunodeficiency Neg Hx     Rhinitis Neg Hx     Urticaria Neg Hx        Review of Systems  Unable to obtain secondary to confusion.    Physical Examination  /80 (BP Location: Right leg, Patient Position:  Lying)   Pulse 78   Temp 98.2 °F (36.8 °C) (Oral)   Resp 18   Wt 85.3 kg (188 lb)   SpO2 98%   BMI 24.80 kg/m²   General appearance: alert, cooperative, no distress + confusion and mild aphasia  HENT: Normocephalic, atraumatic, neck symmetrical, no nasal discharge   Eyes: conjunctivae/corneas clear, PERRL, EOM's intact, sclera anicteric  Lungs: clear to auscultation bilaterally, no dullness to percussion bilaterally, symmetric expansion, breathing unlabored  Heart: regular rate and rhythm without rub; no displacement of the PMI   Abdomen: soft, NT/ND, + BS, + PEG in place and unremarkable.  Extremities: extremities symmetric; no clubbing, cyanosis, or edema  Integument: Skin color, texture, turgor normal; no rashes; hair distrubution normal, no jaundice  Neurologic: alert/awake/confused  Psychiatric: confused      Labs:  Lab Results   Component Value Date    WBC 7.38 09/22/2020    HGB 11.8 (L) 09/22/2020    HCT 36.0 (L) 09/22/2020     (H) 09/22/2020     09/22/2020         CMP  Sodium   Date Value Ref Range Status   09/23/2020 139 136 - 145 mmol/L Final     Potassium   Date Value Ref Range Status   09/23/2020 5.0 3.5 - 5.1 mmol/L Final     Chloride   Date Value Ref Range Status   09/23/2020 105 95 - 110 mmol/L Final     CO2   Date Value Ref Range Status   09/23/2020 21 (L) 23 - 29 mmol/L Final     Glucose   Date Value Ref Range Status   09/23/2020 78 70 - 110 mg/dL Final     BUN, Bld   Date Value Ref Range Status   09/23/2020 55 (H) 8 - 23 mg/dL Final     Creatinine   Date Value Ref Range Status   09/23/2020 7.2 (H) 0.5 - 1.4 mg/dL Final   08/08/2013 1.5 (H) 0.5 - 1.4 mg/dL Final     Calcium   Date Value Ref Range Status   09/23/2020 8.9 8.7 - 10.5 mg/dL Final   08/08/2013 9.0 8.7 - 10.5 mg/dL Final     Total Protein   Date Value Ref Range Status   09/23/2020 7.0 6.0 - 8.4 g/dL Final     Albumin   Date Value Ref Range Status   09/23/2020 3.6 3.5 - 5.2 g/dL Final   09/12/2013 4.3 3.6 - 5.1 g/dL  Final     Comment:     @ Test Performed By:  [facility] Baldwin M.D., [address]   [address] [place] [address]  [facility] #[id number]     Total Bilirubin   Date Value Ref Range Status   09/23/2020 0.5 0.1 - 1.0 mg/dL Final     Comment:     For infants and newborns, interpretation of results should be based  on gestational age, weight and in agreement with clinical  observations.  Premature Infant recommended reference ranges:  Up to 24 hours.............<8.0 mg/dL  Up to 48 hours............<12.0 mg/dL  3-5 days..................<15.0 mg/dL  6-29 days.................<15.0 mg/dL       Alkaline Phosphatase   Date Value Ref Range Status   09/23/2020 99 55 - 135 U/L Final     AST   Date Value Ref Range Status   09/23/2020 15 10 - 40 U/L Final     ALT   Date Value Ref Range Status   09/23/2020 13 10 - 44 U/L Final     Anion Gap   Date Value Ref Range Status   09/23/2020 13 8 - 16 mmol/L Final   08/08/2013 11 5 - 15 meq/L Final     eGFR if    Date Value Ref Range Status   09/23/2020 8 (A) >60 mL/min/1.73 m^2 Final     eGFR if non    Date Value Ref Range Status   09/23/2020 7 (A) >60 mL/min/1.73 m^2 Final     Comment:     Calculation used to obtain the estimated glomerular filtration  rate (eGFR) is the CKD-EPI equation.            Imaging:  CT scan was independently visualized and reviewed by me and showed findings as above.    I have personally reviewed these images    Case discussed by phone with primary team who state that patient has no bleeding and complaint of pain seems to be improved.      Assessment:   1.  LLQ pain  2.  History of diverticulosis  3.  ESRD on HD  4.  History of CVA  5.  Elevated lipase - mild, likely secondary to ESRD/decreased clearance    Plan:  1.  Diet as tolerated  2.  Daily stool softener PRN  3.  Continue current management per primary team  4.  Given recent scopes, benign exam, and resolution of  symptoms, no acute GI intervention warranted at this time.  Will sign off for now.  Please call for questions or any acute change in status.  5.  Communication will be sent to the referring provider regarding my assessment and plan on this patient via EPIC.      Messi Madrid MD  [facility]  [address], [address]  [place] [address]  Office: [phone]  Fax: [phone]

## 2020-09-24 NOTE — PT/OT/SLP PROGRESS
"Physical Therapy Treatment    Patient Name:  Micheal Hunt   MRN:  [id number]    Recommendations:     Discharge Recommendations:  home health PT   Discharge Equipment Recommendations: none   Barriers to discharge: None    Assessment:     Micheal Hunt is a 79 y.o. male admitted with a medical diagnosis of Pancreatitis.  He presents with the following impairments/functional limitations:  impaired endurance, impaired self care skills, impaired functional mobilty, gait instability, impaired cognition, decreased lower extremity function, decreased upper extremity function, decreased safety awareness. Pt requiring max encouragement to participate with PT today - informed pt his daughter wanted him to work with PT before d/c. Thera ex x20 AP, LAQ, jany, SLR. Pt able to stand and take few steps in order to transfer from bed to chair - mod A with another person for safety. Pt with increased difficulty standing and inability to maintain proper alignment with RLE - consistently sliding in front of pt's LLE. Pt OOB to chair post PT. HHPT recommended.      Rehab Prognosis: Fair; patient would benefit from acute skilled PT services to address these deficits and reach maximum level of function.    Recent Surgery: * No surgery found *      Plan:     During this hospitalization, patient to be seen 6 x/week to address the identified rehab impairments via gait training, therapeutic activities, therapeutic exercises and progress toward the following goals:    · Plan of Care Expires:  10/30/20    Subjective   Pt not wanting to work with PT - requiring max encouragement from PT, nurse Katty, and daughter to participate. Pt agreeable finally  Pt became tearful once OOB in chair - stating "I can't walk"  Pt reporting that he is hungry and wants to eat lunch  Pt repeatedly stating that his "mama and daughter Tonya are downstairs to get me"  Chief Complaint: "don't want to get up"  Patient/Family Comments/goals: to go " home  Pain/Comfort:  · Pain Rating 1: 0/10      Objective:     Communicated with nurse Alaniz prior to session.  Patient found HOB elevated with PEG Tube, telemetry, bed alarm upon PT entry to room.     General Precautions: Standard, fall, seizure   Orthopedic Precautions:N/A   Braces: N/A     Functional Mobility:  · Bed Mobility:     · Rolling Right: minimum assistance  · Scooting: minimum assistance  · Supine to Sit: moderate assistance  · Sit to Supine: moderate assistance  · Transfers:     · Sit to Stand:  moderate assistance with hand-held assist  · Bed to Chair: moderate assistance with  hand-held assist  using  Stand Pivot  · Gait: Pt able to take few steps modA with hand-held assist - another person for safety  · Balance: Fair      AM-PAC 6 CLICK MOBILITY          Therapeutic Activities and Exercises:  Patient was educated on the importance of OOB activity and functional mobility to negate negative effects of prolonged bed rest during hospitalization, safe transfers and ambulation, and D/C planning     Thera ex x20 AP, SLR, LAQ, marches  OOB to chair post PT    Patient left up in chair with all lines intact, call button in reach, chair alarm on and nurse Alaniz notified..    GOALS:   Multidisciplinary Problems     Physical Therapy Goals        Problem: Physical Therapy Goal    Goal Priority Disciplines Outcome Goal Variances Interventions   Physical Therapy Goal     PT, PT/OT Ongoing, Progressing     Description: Goals to be met by: 10/30/20     Patient will increase functional independence with mobility by performin. Supine to sit with MInimal Assistance  2. Sit to stand transfer with Minimal Assistance  3. Bed to chair transfer with Minimal Assistance using hand-held assist  4. Gait  x 50 feet with Minimal Assistance using Rolling Walker.   5. Lower extremity exercise program x20 reps                     Time Tracking:     PT Received On: 20  PT Start Time: 1131     PT Stop Time: 1157  PT Total  Time (min): 26 min     Billable Minutes: Therapeutic Activity 15 and Therapeutic Exercise 11    Treatment Type: Treatment  PT/PTA: PT     PTA Visit Number: 0     GOKUL Koenig  09/24/2020

## 2020-09-24 NOTE — PLAN OF CARE
Reviewed POC with pt, pt verbalized understanding, needs reinforcement, A&O x2 with confusion, room air maintained, no c/o sob, pain maintained during shift, NPO Diet maintained, Tube feedings maintained, Tele maintained, VTE maintained, no wounds, Q2 turn maintained, IV maintained, Visualized pt, safety intact, bed alarm on,bed in low position, locked, call light and personal items within reach. Will continue to monitor.           Problem: Fall Injury Risk  Goal: Absence of Fall and Fall-Related Injury  Outcome: Ongoing, Progressing     Problem: Adult Inpatient Plan of Care  Goal: Plan of Care Review  Outcome: Ongoing, Progressing  Goal: Patient-Specific Goal (Individualization)  Outcome: Ongoing, Progressing  Goal: Absence of Hospital-Acquired Illness or Injury  Outcome: Ongoing, Progressing  Goal: Optimal Comfort and Wellbeing  Outcome: Ongoing, Progressing  Goal: Readiness for Transition of Care  Outcome: Ongoing, Progressing  Goal: Rounds/Family Conference  Outcome: Ongoing, Progressing     Problem: Diabetes Comorbidity  Goal: Blood Glucose Level Within Desired Range  Outcome: Ongoing, Progressing     Problem: Adjustment to Illness (Sepsis/Septic Shock)  Goal: Optimal Coping  Outcome: Ongoing, Progressing     Problem: Bleeding (Sepsis/Septic Shock)  Goal: Absence of Bleeding  Outcome: Ongoing, Progressing     Problem: Glycemic Control Impaired (Sepsis/Septic Shock)  Goal: Blood Glucose Level Within Desired Range  Outcome: Ongoing, Progressing     Problem: Hemodynamic Instability (Sepsis/Septic Shock)  Goal: Effective Tissue Perfusion  Outcome: Ongoing, Progressing     Problem: Infection (Sepsis/Septic Shock)  Goal: Absence of Infection Signs/Symptoms  Outcome: Ongoing, Progressing     Problem: Nutrition Impaired (Sepsis/Septic Shock)  Goal: Optimal Nutrition Intake  Outcome: Ongoing, Progressing     Problem: Respiratory Compromise (Sepsis/Septic Shock)  Goal: Effective Oxygenation and Ventilation  Outcome:  Ongoing, Progressing     Problem: Skin Injury Risk Increased  Goal: Skin Health and Integrity  Outcome: Ongoing, Progressing     Problem: Oral Intake Inadequate  Goal: Improved Oral Intake  Outcome: Ongoing, Progressing     Problem: Infection (Hemodialysis)  Goal: Absence of Infection Signs/Symptoms  Outcome: Ongoing, Progressing     Problem: Hemodynamic Instability (Hemodialysis)  Goal: Vital Signs Remain in Desired Range  Outcome: Ongoing, Progressing     Problem: Device-Related Complication Risk (Hemodialysis)  Goal: Safe, Effective Therapy Delivery  Outcome: Ongoing, Progressing

## 2020-09-24 NOTE — PLAN OF CARE
The pt is clear for discharge from case management. Viktoria Cm LCSW     09/24/20 0926   Final Note   Assessment Type Final Discharge Note   Anticipated Discharge Disposition Home-Health

## 2020-09-24 NOTE — PLAN OF CARE
I made a PCP follow up appt with Dr. Lakhani for 10/6/2020 at 10:00 am. AVS updated. I sent the pts HH orders and packet to [facility] via [facility] for resumption of HH services. Viktoria Cm LCSW     09/24/20 0962   Post-Acute Status   Post-Acute Authorization Home Health   Home Health Status Referrals Sent   Patient choice form signed by patient/caregiver List with quality metrics by geographic area provided

## 2020-09-24 NOTE — PLAN OF CARE
The pt is discharging home with [facility]. Discharge destination booked       09/24/20 4596   Post-Acute Status   Post-Acute Authorization Home Health   Home Health Status Set-up Complete

## 2020-09-24 NOTE — CONSULTS
INPATIENT NEPHROLOGY CONSULT   [facility] [place] NEPHROLOGY    Micheal Hunt  09/24/2020    Reason for consultation:      esrd    Chief Complaint:   Chief Complaint   Patient presents with    Abdominal Pain     left lower abdominal pain          History of Present Illness:    Per H and P    Micheal Hunt is a 79 y.o. male with PMH of CVA with left-sided deficits, ESRD on dialysis, and a.fib on eliquia, seizures, hypertension and hyperlipidemia who presents to the ED for evaluation of left lower quadrant abdominal pain  5/10 sharp achy pain associated with diarrhea since yesterday.  He reports pain increases with movement alleviates with rest.  His caretaker states he began having diarrhea yesterday with 4 episodes during the day.  She states that he is on tube feedings due to dysphagia although he occasionally has soft foods by mouth.     9/23 seen on dialysis.  No distress.  Aphasic  9/34  No distress.  AFVSS      Plan of Care:       Assessment:    esrd--seen on dialysis  --continue dialysis per routine  --fluid restrict  --renal dose medication per routine  --continue outpt medication  --continue binders with meals    Anemia  --erythropoiesis stimulating agent with renal replacement therapy    Mild acidosis  --35 bicarb bath    Hypertension  --uf to dry weight  --continue Coreg       Thank you for allowing us to participate in this patient's care. We will continue to follow.    Vital Signs:  Temp Readings from Last 3 Encounters:   09/24/20 97.9 °F (36.6 °C) (Oral)   09/01/20 98.7 °F (37.1 °C) (Skin)   08/24/20 97.4 °F (36.3 °C)       Pulse Readings from Last 3 Encounters:   09/24/20 70   09/01/20 63   08/24/20 61       BP Readings from Last 3 Encounters:   09/24/20 129/77   09/01/20 120/70   08/24/20 134/68       Weight:  Wt Readings from Last 3 Encounters:   09/22/20 85.3 kg (188 lb)   09/01/20 85.7 kg (188 lb 15 oz)   08/23/20 82.5 kg (181 lb 14.1 oz)       Past Medical & Surgical History:  Past  Medical History:   Diagnosis Date    NICK (acute kidney injury) 7/29/2016    Allergy     Anemia, mild 12/15/2014    Arthritis     Gout    Atrial fibrillation 03/2019    Benign essential HTN 3/27/2012    BMI 29.0-29.9,adult 5/10/2018    BPH (benign prostatic hyperplasia)     BPH (benign prostatic hyperplasia)     CAD (coronary artery disease) 2006    Carotid artery occlusion     60-70% FROYLAN, LICA < 50%    Chronic kidney disease     due to ibuprofen    Colon polyp     CRF (chronic renal failure), stage 5     Diabetes mellitus     Diverticulosis     ESRD (end stage renal disease) on dialysis     Gastritis     GERD (gastroesophageal reflux disease)     Gout     History of colon polyps 5/3/2018    HTN (hypertension) 3/27/2012    Hyperlipidemia     Hyperlipidemia     LLL pneumonia 6/14/2018    LVH (left ventricular hypertrophy)     Mesenteric ischemia     Murmur, cardiac 3/27/2012    GRICELDA (obstructive sleep apnea)     DOES NOT USE A MACHINE    Sinus problem     Stroke 03/2019    acute left PCA    Syncope and collapse        Past Surgical History:   Procedure Laterality Date    APPENDECTOMY      CARDIAC CATHETERIZATION  2012    LIMA to LAD patent, SVG to RCA    COLONOSCOPY  2011    COLONOSCOPY N/A 5/3/2018    Procedure: COLONOSCOPY;  Surgeon: Messi Harris MD;  Location: [facility];  Service: Endoscopy;  Laterality: N/A;    COLONOSCOPY N/A 8/18/2020    Procedure: COLONOSCOPY;  Surgeon: Messi Harris MD;  Location: [facility] ENDO;  Service: Endoscopy;  Laterality: N/A;    CORONARY ARTERY BYPASS GRAFT  4/2007    x 2 [place]    ESOPHAGOGASTRODUODENOSCOPY N/A 8/17/2020    Procedure: EGD (ESOPHAGOGASTRODUODENOSCOPY);  Surgeon: Eben Soto MD;  Location: [facility] ENDO;  Service: Endoscopy;  Laterality: N/A;    JOINT REPLACEMENT      left knee total replacement  X 3    mid leftt finger      from a cactuss    MOLE REMOVAL  2016    rotative cuff      no rotative cuffs on bilat  shoulders has pins     SHOULDER SURGERY      shoulder surgery bilat  RIGHT X 4; LEFT X 3       Past Social History:  Social History     Socioeconomic History    Marital status:      Spouse name: Not on file    Number of children: Not on file    Years of education: Not on file    Highest education level: Not on file   Occupational History    Not on file   Social Needs    Financial resource strain: Somewhat hard    Food insecurity     Worry: Often true     Inability: Often true    Transportation needs     Medical: Yes     Non-medical: Yes   Tobacco Use    Smoking status: Never Smoker    Smokeless tobacco: Never Used   Substance and Sexual Activity    Alcohol use: No     Frequency: Never     Drinks per session: Patient refused     Binge frequency: Patient refused    Drug use: No    Sexual activity: Not on file   Lifestyle    Physical activity     Days per week: Not on file     Minutes per session: Not on file    Stress: Not on file   Relationships    Social connections     Talks on phone: More than three times a week     Gets together: Three times a week     Attends Mandaeism service: Not on file     Active member of club or organization: Patient refused     Attends meetings of clubs or organizations: Patient refused     Relationship status:    Other Topics Concern    Not on file   Social History Narrative    [place]; daughter nearby       Medications:  No current facility-administered medications on file prior to encounter.      Current Outpatient Medications on File Prior to Encounter   Medication Sig Dispense Refill    allopurinoL (ZYLOPRIM) 100 MG tablet 100 mg by FEEDING TUBE route once daily.      apixaban (ELIQUIS) 2.5 mg Tab 2.5 mg by FEEDING TUBE route 2 (two) times daily.      aspirin (ECOTRIN) 81 MG EC tablet 81 mg by FEEDING TUBE route once daily.      atorvastatin (LIPITOR) 40 MG tablet 1 tablet (40 mg total) by Per G Tube route once daily. 90 tablet 4     carvediloL (COREG) 12.5 MG tablet Take 6.5 tablets (81.25 mg total) by mouth 2 (two) times daily. (Patient taking differently: 12.5 mg by Per G Tube route once daily. ) 90 tablet 4    levETIRAcetam (KEPPRA) 100 mg/mL Soln 7.5 ml by PEG daily (Patient taking differently: 750 mg by Per G Tube route once daily. 7.5 ml by PEG daily) 120 mL 5    nut.tx.imp.renal fxn,lac-reduc (NEPRO CARB STEADY ORAL) 240 mLs by FEEDING TUBE route 2 (two) times daily. 6 hours apart AM and afternoon      nut.tx.imp.renal fxn,lac-reduc (NEPRO CARB STEADY ORAL) 1,000 mLs by FEEDING TUBE route every evening.       senna-docusate 8.6-50 mg (PERICOLACE) 8.6-50 mg per tablet 1 tablet by Per G Tube route once daily.       vit b cmplx 3-fa-vit c-biotin 1- mg-mg-mcg (NEPHRO-EMMETT RX) 1- mg-mg-mcg Tab Take 1 tablet by mouth once daily. (Patient taking differently: 1 tablet by Per G Tube route once daily. ) 90 tablet 3     Scheduled Meds:   allopurinoL  100 mg Per G Tube Daily    apixaban  2.5 mg Per G Tube BID    aspirin  81 mg Oral Daily    atorvastatin  40 mg Per G Tube Daily    carvediloL  12.5 mg Per G Tube Daily    gabapentin  100 mg Oral BID    levetiracetam oral soln  750 mg Per G Tube Daily    lidocaine  1 patch Transdermal Q24H    mupirocin   Nasal BID     Continuous Infusions:  PRN Meds:.cyclobenzaprine, dextrose 50%, dextrose 50%, glucagon (human recombinant), glucose, glucose, HYDROcodone-acetaminophen, influenza, insulin aspart U-100, magnesium oxide, magnesium oxide, morphine, ondansetron    Allergies:  Ace inhibitors, Angiotensin ii, Arb-angiotensin receptor antagonist, Eplerenone, and Sulfa (sulfonamide antibiotics)    Past Family History:  Reviewed; refer to Hospitalist Admission Note    Review of Systems:  Review of Systems - All 14 systems reviewed and negative, except as noted in HPI    Physical Exam:    /77 (BP Location: Right arm, Patient Position: Lying)   Pulse 70   Temp 97.9 °F (36.6 °C)  (Oral)   Resp 18   Wt 85.3 kg (188 lb)   SpO2 96%   BMI 24.80 kg/m²     General Appearance:    NAD   Head:    Normocephalic, without obvious abnormality, atraumatic   Eyes:    PER, conjunctiva/corneas clear, EOM's intact in both eyes        Throat:   Lips, mucosa, and tongue normal; teeth and gums normal   Back:     Symmetric, no curvature, ROM normal, no CVA tenderness   Lungs:     Clear to auscultation bilaterally, respirations unlabored   Chest wall:    No tenderness or deformity   Heart:    Regular rate and rhythm, S1 and S2 normal, no murmur, rub   or gallop   Abdomen:     Soft, non-tender, bowel sounds active all four quadrants,     no masses, no organomegaly   Extremities:   Extremities normal, atraumatic, no cyanosis or edema   Pulses:   2+ and symmetric all extremities   MSK:   No joint or muscle swelling, tenderness or deformity   Skin:   Skin color, texture, turgor normal, no rashes or lesions   Neurologic:   Expressive aphasia      Results:  Lab Results   Component Value Date     09/24/2020    K 4.2 09/24/2020     09/24/2020    CO2 24 09/24/2020    BUN 44 (H) 09/24/2020    CREATININE 6.2 (H) 09/24/2020    CALCIUM 9.0 09/24/2020    ANIONGAP 13 09/24/2020    ESTGFRAFRICA 9 (A) 09/24/2020    EGFRNONAA 8 (A) 09/24/2020       Lab Results   Component Value Date    CALCIUM 9.0 09/24/2020    PHOS 4.1 08/24/2020       No results for input(s): WBC, RBC, HGB, HCT, PLT, MCV, MCH, MCHC in the last 24 hours.       Frankie Rojo MD  Nephrology  [facility]  [phone]         I have personally reviewed pertinent radiological imaging and reports.

## 2020-09-24 NOTE — PLAN OF CARE
Received a call from pts daughter, Tonya. She stated that for her to  the lidocaine patch she would need pre authorization. I informed her that they were available over the counter as an alternative until we can resolve the medications authorization. She stated the percentage of medication was different than the RX. I explained to her that since she was discharged she could reach out to Bell Yanes. I provided her with Bell's phone number and left a message for Bell explaining matter and pts daughters phone number.

## 2020-09-24 NOTE — NURSING
Pt discharged. AVS reviewed and Home health nurse verified understanding. IV removed. Pt escorted to exit via wheelchair. No acute distress noted.

## 2020-09-24 NOTE — PLAN OF CARE
Problem: Physical Therapy Goal  Goal: Physical Therapy Goal  Description: Goals to be met by: 10/30/20     Patient will increase functional independence with mobility by performin. Supine to sit with MInimal Assistance  2. Sit to stand transfer with Minimal Assistance  3. Bed to chair transfer with Minimal Assistance using hand-held assist  4. Gait  x 50 feet with Minimal Assistance using Rolling Walker.   5. Lower extremity exercise program x20 reps    Outcome: Ongoing, Progressing  Pt requiring encouragement to participate with PT. Thera ex x20 AP, GHAZALA, jany, SONIYAR. Pt able to stand and take few steps in order to transfer from bed to chair - mod A with another person for safety. OOB to chair. HHPT.

## 2020-09-25 NOTE — DISCHARGE SUMMARY
[facility] Hospital Medicine  Discharge Summary      Patient Name: Micheal Hunt  MRN: [id number]  Admission Date: 9/22/2020  Hospital Length of Stay: 0 days  Discharge Date and Time: 9/24/2020  3:30 PM  Attending Physician: No att. providers found   Discharging Provider: Bell Wilkinson NP  Primary Care Provider: Pk Lakhani MD      HPI:   Micheal Hunt is a 79 y.o. male with PMH of CVA with left-sided deficits, ESRD on dialysis, and a.fib on eliquia, seizures, hypertension and hyperlipidemia who presents to the ED for evaluation of left lower quadrant abdominal pain  5/10 sharp achy pain associated with diarrhea since yesterday.  He reports pain increases with movement alleviates with rest.  His caretaker states he began having diarrhea yesterday with 4 episodes during the day.  She states that he is on tube feedings due to dysphagia although he occasionally has soft foods by mouth.  Patient currently receives hemodialysis Monday Wednesday and Friday he did have dialysis yesterday.  Patient is a poor historian and minimally conversive therefore information is gathered from his caretaker.       * No surgery found *      Hospital Course:   During observation stay.  He was noted to have a slightly elevated lipase with left lower quadrant abdominal pain and was recommended for admission for GI evaluation.  GI consulted and recommends conservative management.  Nephrology consulted for end-stage renal disease management.  Patient underwent CT in the ED without any acute process noted.  He was noted to have improvement of his left lower quadrant pain however reported persistent left flank pain.  He was prescribed a lidocaine patch and Flexeril which improved his pain.  Patient is sitting up in chair without complaints this morning and denies pain.  He is requesting to go home.     Updated daughter Tonya on outpatient plan of care.  Reviewed all inpatient diagnostic studies.  She  requested evaluation of his gallbladder however patient has no evidence of acute cholecystitis on imaging nor does he have right upper quadrant pain.  Patient with evidence of cholecystectomy on imaging.  Reviewed old records with daughter stating such.  Patient is medically stable for discharge.    Physical exam  Chest clear auscultation decreased at the bases  Abdomen soft nontender nondistended.  Peg tube in place.  Denies back pain this morning.     Consults:   Consults (From admission, onward)        Status Ordering Provider     Inpatient consult to Gastroenterology  Once     Provider:  Messi Harris MD    Completed STEFANIE STONER     Inpatient consult to Registered Dietitian/Nutritionist  Once     Provider:  (Not yet assigned)    Completed STEFANIE STONER     IP consult to case management  Once     Provider:  (Not yet assigned)    Completed MONICA GODDARD     IP consult to dietary  Once     Provider:  (Not yet assigned)    Completed STEFANIE STONER          No new Assessment & Plan notes have been filed under this hospital service since the last note was generated.  Service: Hospital Medicine    Final Active Diagnoses:    Diagnosis Date Noted POA    PRINCIPAL PROBLEM:  Pancreatitis [K85.90] 09/22/2020 Yes    Hemiparesis, aphasia, and dysphagia as late effects of stroke [I69.391, I69.359, I69.320] 09/22/2020 Not Applicable    PVD (peripheral vascular disease) [I73.9] 09/22/2020 Yes    Seizure [R56.9] 08/22/2020 Yes    PEG (percutaneous endoscopic gastrostomy) status [Z93.1] 08/16/2020 Not Applicable    ESRD (end stage renal disease) on dialysis [N18.6, Z99.2] 03/23/2019 Not Applicable    Hypertension due to end stage renal disease caused by type 2 diabetes mellitus, on dialysis [E11.22, I12.0, Z99.2, N18.6] 03/18/2019 Not Applicable    Type 2 diabetes mellitus with renal complication [E11.29] 03/18/2019 Yes    Persistent atrial fibrillation [I48.19] 03/12/2019 Yes    Secondary  hyperparathyroidism [N25.81] 08/29/2018 Yes    Hyperlipidemia [E78.5] 03/27/2012 Yes      Problems Resolved During this Admission:       Discharged Condition: stable    Disposition: Home-Health Care [facility]    Follow Up:  Follow-up Information     Frankie Rojo MD.    Specialty: Nephrology  Why: Please call to make an appt. [phone]  Contact information:  Kristan CASTILLO  [facility] NEPHROLOGY [place]  [place] [address]  [phone]             [facility] [place].    Specialties: Hospice Services, Home Health Services  Why: Home Health  Contact information:  [address]  [place] [address]  [phone]                 Patient Instructions:      Diet renal     Notify your health care provider if you experience any of the following:  worsening rash     Notify your health care provider if you experience any of the following:  difficulty breathing or increased cough     Notify your health care provider if you experience any of the following:  redness, tenderness, or signs of infection (pain, swelling, redness, odor or green/yellow discharge around incision site)     Notify your health care provider if you experience any of the following:  severe uncontrolled pain     Notify your health care provider if you experience any of the following:  persistent nausea and vomiting or diarrhea     Notify your health care provider if you experience any of the following:  temperature >100.4     SUBSEQUENT HOME HEALTH ORDERS   Order Comments: Subsequent Home Health Orders    Current Medications:  Current Facility-Administered Medications:  allopurinoL tablet 100 mg, 100 mg, Per G Tube, Daily, Bell Wilkinson NP, 100 mg at 09/24/20 0853  apixaban tablet 2.5 mg, 2.5 mg, Per G Tube, BID, Bell Wilkinson NP, 2.5 mg at 09/24/20 0853  aspirin EC tablet 81 mg, 81 mg, Oral, Daily, Bell Wilkinson NP, 81 mg at 09/24/20 0854  atorvastatin tablet 40 mg, 40 mg, Per G Tube, Daily, Bell Wilkinson NP, 40 mg at 09/24/20  0853  carvediloL tablet 12.5 mg, 12.5 mg, Per G Tube, Daily, Bell Wilkinson NP, 12.5 mg at 09/24/20 0853  cyclobenzaprine tablet 10 mg, 10 mg, Per G Tube, TID PRN, Bell Wilkinson NP  dextrose 50% injection 12.5 g, 12.5 g, Intravenous, PRN, Bell Wilkinson NP  dextrose 50% injection 25 g, 25 g, Intravenous, PRN, Bell Wilkinson NP  gabapentin capsule 100 mg, 100 mg, Oral, BID, Raymundo Ball MD, 100 mg at 09/24/20 0853  glucagon (human recombinant) injection 1 mg, 1 mg, Intramuscular, PRN, Bell Wilkinson NP  glucose chewable tablet 16 g, 16 g, Oral, PRN, Bell Wilkinson NP  glucose chewable tablet 24 g, 24 g, Oral, PRN, Bell Wilkinson NP  HYDROcodone-acetaminophen 5-325 mg per tablet 1 tablet, 1 tablet, Per G Tube, Q6H PRN, Raymundo Ball MD, 1 tablet at 09/24/20 0853  influenza (QUADRIVALENT ADJUVANTED PF) vaccine 0.5 mL, 0.5 mL, Intramuscular, vaccine x 1 dose, Raymundo Ball MD  insulin aspart U-100 pen 0-5 Units, 0-5 Units, Subcutaneous, QID (AC + HS) PRN, Bell Wilkinson NP  levetiracetam oral soln Soln 750 mg, 750 mg, Per G Tube, Daily, Bell Wilkinson NP, 750 mg at 09/24/20 0853  lidocaine 5 % patch 1 patch, 1 patch, Transdermal, Q24H, Raymundo Ball MD, 1 patch at 09/23/20 2223  magnesium oxide tablet 800 mg, 800 mg, Oral, PRN, Bell Wilkinson NP  magnesium oxide tablet 800 mg, 800 mg, Oral, PRN, Bell Wilkinson NP  morphine injection 3 mg, 3 mg, Intravenous, Q4H PRN, Huang Covington NP, 3 mg at 09/23/20 0858  mupirocin 2 % ointment, , Nasal, BID, Darrel Cary MD  ondansetron injection 8 mg, 8 mg, Intravenous, Q8H PRN, Bell Wilkinson NP, 8 mg at 09/23/20 0828        Nursing:   N/A    Diet:   renal diet    Activities:   activity as tolerated    Labs:  NA    Referrals/ Consults  Physical Therapy to evaluate and treat. Evaluate for home safety and equipment needs; Establish/upgrade home exercise program. Perform / instruct on therapeutic  exercises, gait training, transfer training, and Range of Motion.  Occupational Therapy to evaluate and treat. Evaluate home environment for safety and equipment needs. Perform/Instruct on transfers, ADL training, ROM, and therapeutic exercises.    Home Health Aide:  Nursing Three times weekly, Physical Therapy Three times weekly and Occupational Therapy Three times weekly     Order Specific Question Answer Comments   What Home Health Agency is the patient currently using? Other/External      Activity as tolerated       Significant Diagnostic Studies: Labs:   BMP:   Recent Labs   Lab 09/24/20  0559   *      K 4.2      CO2 24   BUN 44*   CREATININE 6.2*   CALCIUM 9.0    and CMP   Recent Labs   Lab 09/24/20  0559      K 4.2      CO2 24   *   BUN 44*   CREATININE 6.2*   CALCIUM 9.0   PROT 7.3   ALBUMIN 3.6   BILITOT 0.3   ALKPHOS 107   AST 14   ALT 13   ANIONGAP 13   ESTGFRAFRICA 9*   EGFRNONAA 8*       Pending Diagnostic Studies:     None         Medications:  Reconciled Home Medications:      Medication List      START taking these medications    cyclobenzaprine 10 MG tablet  Commonly known as: FLEXERIL  1 tablet (10 mg total) by Per G Tube route 3 (three) times daily as needed for Muscle spasms.     lidocaine 5 %  Commonly known as: LIDODERM  Place 1 patch onto the skin once daily. Remove & Discard patch within 12 hours or as directed by MD for 5 days        CHANGE how you take these medications    carvediloL 12.5 MG tablet  Commonly known as: COREG  Take 6.5 tablets (81.25 mg total) by mouth 2 (two) times daily.  What changed:   · how much to take  · how to take this  · when to take this     levETIRAcetam 100 mg/mL Soln  Commonly known as: KEPPRA  7.5 ml by PEG daily  What changed:   · how much to take  · how to take this  · when to take this     NEPHRO-EMMETT RX 1- mg-mg-mcg Tab  Generic drug: vit b cmplx 3-fa-vit c-biotin 1- mg-mg-mcg  Take 1 tablet by mouth once  daily.  What changed: how to take this        CONTINUE taking these medications    allopurinoL 100 MG tablet  Commonly known as: ZYLOPRIM  100 mg by FEEDING TUBE route once daily.     apixaban 2.5 mg Tab  Commonly known as: ELIQUIS  2.5 mg by FEEDING TUBE route 2 (two) times daily.     aspirin 81 MG EC tablet  Commonly known as: ECOTRIN  81 mg by FEEDING TUBE route once daily.     atorvastatin 40 MG tablet  Commonly known as: LIPITOR  1 tablet (40 mg total) by Per G Tube route once daily.     * NEPRO CARB STEADY ORAL  240 mLs by FEEDING TUBE route 2 (two) times daily. 6 hours apart AM and afternoon     * NEPRO CARB STEADY ORAL  1,000 mLs by FEEDING TUBE route every evening.     senna-docusate 8.6-50 mg 8.6-50 mg per tablet  Commonly known as: PERICOLACE  1 tablet by Per G Tube route once daily.         * This list has 2 medication(s) that are the same as other medications prescribed for you. Read the directions carefully, and ask your doctor or other care provider to review them with you.                Indwelling Lines/Drains at time of discharge:   Lines/Drains/Airways     Drain                 Gastrostomy/Enterostomy Gastrostomy tube w/ balloon -- days         Hemodialysis AV Fistula Left upper arm -- days                Time spent on the discharge of patient: 60 minutes  Patient was seen and examined on the date of discharge and determined to be suitable for discharge.         Bell Wilkinson NP  Department of Hospital Medicine  [facility]

## 2020-09-25 NOTE — HOSPITAL COURSE
During observation stay.  He was noted to have a slightly elevated lipase with left lower quadrant abdominal pain and was recommended for admission for GI evaluation.  GI consulted and recommends conservative management.  Nephrology consulted for end-stage renal disease management.  Patient underwent CT in the ED without any acute process noted.  He was noted to have improvement of his left lower quadrant pain however reported persistent left flank pain.  He was prescribed a lidocaine patch and Flexeril which improved his pain.  Patient is sitting up in chair without complaints this morning and denies pain.  He is requesting to go home.     Updated daughter Tonya on outpatient plan of care.  Reviewed all inpatient diagnostic studies.  She requested evaluation of his gallbladder however patient has no evidence of acute cholecystitis on imaging nor does he have right upper quadrant pain.  Patient with evidence of cholecystectomy on imaging.  Reviewed old records with daughter stating such.  Patient is medically stable for discharge.    Physical exam  Chest clear auscultation decreased at the bases  Abdomen soft nontender nondistended.  Peg tube in place.  Denies back pain this morning.

## 2020-09-29 NOTE — ED NOTES
PEG tube flushed and aspirated per the request of caregiver. Upon discharge, patient is AAOx4, no cardiac or respiratory complications. Follow up care and  Medications have been reviewed with patient and has been instructed to return to the ER if needed. Patient verbalized understanding and was assisted to vehicle without difficulty. HARINDER JUAREZ.

## 2020-09-29 NOTE — ED NOTES
Patient resting in bed with eyes closed, chest rise is symmetrical, respirations are regular and unlabored. HARINDER JUAREZ

## 2020-09-29 NOTE — ED NOTES
Patient and care giver have been updated on plan of care and results. No needs or questions at this time.

## 2020-09-29 NOTE — ED PROVIDER NOTES
Encounter Date: 9/29/2020    SCRIBE #1 NOTE: I, Belem Chu, am scribing for, and in the presence of, Hardeep Rouse MD.       History     Chief Complaint   Patient presents with    GI Bleeding     Caretaker states pt's peg tube had become dislodged from feeding; states she noticed blood mixed in with pt's feeding that leaked onto pad     Time seen by provider: 1:16 PM on 09/29/2020    Chief Complaint: GI Bleeding    Micheal Hunt is a 80 y.o. male who presents to the ED with an onset of GI bleeding. The patient reports his peg tug is backed up and began leaking blood out of his stomach today. The patient's caretaker reports blood was mixed in with his feeding. The patient denies any pain. Patient denies history of ulcers. The patient denies nausea, vomiting or any other symptoms at this time. PSHx of appendectomy, colonoscopy, CABG, and EGD. PMHx of aphasia, CAD, BPH, diverticulitis, HTN, colon polyp, HLD, LVH, GERD, gastritis, carotid artery occlusion, DM, CKF, NICK, ESRD, and CRF.    The history is provided by the patient.     Review of patient's allergies indicates:   Allergen Reactions    Ace inhibitors Other (See Comments)     Cough    Angiotensin ii     Arb-angiotensin receptor antagonist Itching    Eplerenone Other (See Comments)     Marked bradycardia, 40, tiredness and weakness      Sulfa (sulfonamide antibiotics) Itching and Swelling     Patient says this was 10 years ago and doesn't remember what happened     Past Medical History:   Diagnosis Date    NICK (acute kidney injury) 7/29/2016    Allergy     Anemia, mild 12/15/2014    Arthritis     Gout    Atrial fibrillation 03/2019    Benign essential HTN 3/27/2012    BMI 29.0-29.9,adult 5/10/2018    BPH (benign prostatic hyperplasia)     BPH (benign prostatic hyperplasia)     CAD (coronary artery disease) 2006    Carotid artery occlusion     60-70% FROYLAN, LICA < 50%    Chronic kidney disease     due to ibuprofen    Colon polyp      CRF (chronic renal failure), stage 5     Diabetes mellitus     Diverticulosis     ESRD (end stage renal disease) on dialysis     Gastritis     GERD (gastroesophageal reflux disease)     Gout     History of colon polyps 5/3/2018    HTN (hypertension) 3/27/2012    Hyperlipidemia     Hyperlipidemia     LLL pneumonia 2018    LVH (left ventricular hypertrophy)     Mesenteric ischemia     Murmur, cardiac 3/27/2012    GRICELDA (obstructive sleep apnea)     DOES NOT USE A MACHINE    Sinus problem     Stroke 2019    acute left PCA    Syncope and collapse      Past Surgical History:   Procedure Laterality Date    APPENDECTOMY      CARDIAC CATHETERIZATION      LIMA to LAD patent, SVG to RCA    COLONOSCOPY      COLONOSCOPY N/A 5/3/2018    Procedure: COLONOSCOPY;  Surgeon: Messi Harrsi MD;  Location: [facility];  Service: Endoscopy;  Laterality: N/A;    COLONOSCOPY N/A 2020    Procedure: COLONOSCOPY;  Surgeon: Messi Harris MD;  Location: [facility];  Service: Endoscopy;  Laterality: N/A;    CORONARY ARTERY BYPASS GRAFT  4/2007    x 2 [place]    ESOPHAGOGASTRODUODENOSCOPY N/A 2020    Procedure: EGD (ESOPHAGOGASTRODUODENOSCOPY);  Surgeon: Eben Soto MD;  Location: [facility];  Service: Endoscopy;  Laterality: N/A;    JOINT REPLACEMENT      left knee total replacement  X 3    mid leftt finger      from a cactuss    MOLE REMOVAL      rotative cuff      no rotative cuffs on bilat shoulders has pins     SHOULDER SURGERY      shoulder surgery bilat  RIGHT X 4; LEFT X 3     Family History   Problem Relation Age of Onset    Heart disease Mother     Sudden death Father     Cancer Father         advanced lung ca- found after 2 story fall    Stroke Sister     Pneumonia Sister          from PNA    Heart disease Sister     Hypertension Brother     Kidney cancer Neg Hx     Prostate cancer Neg Hx     Urolithiasis Neg Hx     Allergic rhinitis Neg Hx      Allergies Neg Hx     Angioedema Neg Hx     Asthma Neg Hx     Atopy Neg Hx     Eczema Neg Hx     Immunodeficiency Neg Hx     Rhinitis Neg Hx     Urticaria Neg Hx      Social History     Tobacco Use    Smoking status: Never Smoker    Smokeless tobacco: Never Used   Substance Use Topics    Alcohol use: No     Frequency: Never     Drinks per session: Patient refused     Binge frequency: Patient refused    Drug use: No     Review of Systems   Constitutional: Negative for activity change, appetite change, chills, fatigue and fever.   HENT: Negative for sore throat.    Eyes: Negative for visual disturbance.   Respiratory: Negative for apnea and shortness of breath.    Cardiovascular: Negative for chest pain and palpitations.   Gastrointestinal: Negative for abdominal distention, abdominal pain, nausea and vomiting.        + Blood in peg tube.   Genitourinary: Negative for difficulty urinating and dysuria.   Musculoskeletal: Negative for back pain and neck pain.   Skin: Negative for pallor and rash.   Neurological: Negative for weakness and headaches.   Hematological: Does not bruise/bleed easily.   Psychiatric/Behavioral: Negative for agitation.       Physical Exam     Initial Vitals [09/29/20 1253]   BP Pulse Resp Temp SpO2   (!) 149/70 60 18 97.7 °F (36.5 °C) 98 %      MAP       --         Physical Exam    Nursing note and vitals reviewed.  Constitutional: He appears well-developed and well-nourished.   HENT:   Head: Normocephalic and atraumatic.   Eyes: Conjunctivae are normal.   Neck: Normal range of motion. Neck supple.   Cardiovascular: Normal rate, regular rhythm and normal heart sounds. Exam reveals no gallop and no friction rub.    No murmur heard.  Pulmonary/Chest: Breath sounds normal. No respiratory distress. He has no wheezes. He has no rhonchi. He has no rales.   Abdominal: Soft. He exhibits no distension. There is no abdominal tenderness.   No swelling, redness, or tenderness at site of stoma.    Musculoskeletal: Normal range of motion.   Neurological: He is alert and oriented to person, place, and time.   Skin: Skin is warm and dry.   Psychiatric: He has a normal mood and affect.         ED Course   Procedures  Labs Reviewed   CBC W/ AUTO DIFFERENTIAL - Abnormal; Notable for the following components:       Result Value    RBC 3.42 (*)     Hemoglobin 11.1 (*)     Hematocrit 33.8 (*)     Mean Corpuscular Volume 99 (*)     Mean Corpuscular Hemoglobin 32.5 (*)     Platelets 138 (*)     All other components within normal limits   COMPREHENSIVE METABOLIC PANEL - Abnormal; Notable for the following components:    Glucose 141 (*)     BUN, Bld 52 (*)     Creatinine 7.4 (*)     eGFR if  7 (*)     eGFR if non  6 (*)     All other components within normal limits          Imaging Results    None          Medical Decision Making:   History:   Old Medical Records: I decided to obtain old medical records.  Clinical Tests:   Lab Tests: Ordered and Reviewed  ED Management:  80-year-old male presents with blood-tinged drainage from his PEG stoma.  He has no active extravasation.  It is unclear whether this substance was indeed blood.  He is hemodynamically stable with no acute change in his hemoglobin providing skepticism that this was blood that was visualized.  He has also had some constipation with no bowel movement in the past 4 days.  Abdomen is soft and nontender.  Peg tube is flushed with no increased pressure.  He is given GoLYTELY for the constipation and referred to Gastroenterology for further workup.       APC / Resident Notes:   I, Dr. Hardeep Rouse III, personally performed the services described in this documentation. All medical record entries made by the scribe were at my direction and in my presence.  I have reviewed the chart and agree that the record reflects my personal performance and is accurate and complete       Scribe Attestation:   Scribe #1: I performed the above  scribed service and the documentation accurately describes the services I performed. I attest to the accuracy of the note.                      Clinical Impression:     ICD-10-CM ICD-9-CM   1. Constipation, unspecified constipation type  K59.00 564.00                          ED Disposition Condition    Discharge Stable        ED Prescriptions     Medication Sig Dispense Start Date End Date Auth. Provider    polyethylene glycol (COLYTE) 240-22.72-6.72 -5.84 gram SolR (Expires today) Take 4,000 mLs by mouth once. for 1 dose 4,000 mL 9/29/2020 9/29/2020 Hardeep Rouse III, MD        Follow-up Information     Follow up With Specialties Details Why Contact Info    Pk Lakhani MD Family Medicine In 3 days  [address] [facility] [address]  [phone]                                         Hardeep Rouse III, MD  09/29/20 1527       Hardeep Rouse III, MD  10/11/20 0049

## 2020-09-29 NOTE — ED NOTES
"Micheal Hunt presents to the ED with c/o peg tube problem. Patient's care giver reports that patient's peg tube was disconnected from feeding during the night and she noticed "Blood mixed in with his feeding." Patient does not have any bleeding noted at this time. Mucous membranes are pink and moist. Skin is warm, dry and intact. Lungs are clear bilaterally, respirations are regular and unlabored. Denies SOB, cough, congestion or rhinorrhea. BS active x4, no tenderness with palpation, abd is soft and not distended. Denies any appetite or activity change. S1S2, capillary refill is < 2 seconds. Denies dysuria, difficulty urinating, frequency, numbness, tingling or weakness. LARRY GIMENEZS    "

## 2020-09-30 NOTE — TELEPHONE ENCOUNTER
----- Message from Lety Aguilar sent at 9/30/2020  3:37 PM CDT -----  Contact: call sugar with  [employer] [phone]  office# [phone]    Type: Needs Medical Advice  Who Calledjaziel wooten with  [employer] [phone]  office# [phone]  Best Call Back Number:[phone]  office# [phone]  Additional Information:  pt  needs an order  for   labs // pt is  on tube  feeding at  night // does  tube   feeding  need  to stop?  If  labs  or fasting? please call  for details

## 2020-09-30 NOTE — ED PROVIDER NOTES
Encounter Date: 9/30/2020       History     Chief Complaint   Patient presents with    Constipation     9 days     HPI  Review of patient's allergies indicates:   Allergen Reactions    Ace inhibitors Other (See Comments)     Cough    Angiotensin ii     Arb-angiotensin receptor antagonist Itching    Eplerenone Other (See Comments)     Marked bradycardia, 40, tiredness and weakness      Gabapentin     Sulfa (sulfonamide antibiotics) Itching and Swelling     Patient says this was 10 years ago and doesn't remember what happened     Past Medical History:   Diagnosis Date    NICK (acute kidney injury) 7/29/2016    Allergy     Anemia, mild 12/15/2014    Arthritis     Gout    Atrial fibrillation 03/2019    Benign essential HTN 3/27/2012    BMI 29.0-29.9,adult 5/10/2018    BPH (benign prostatic hyperplasia)     BPH (benign prostatic hyperplasia)     CAD (coronary artery disease) 2006    Carotid artery occlusion     60-70% FROYLAN, LICA < 50%    Chronic kidney disease     due to ibuprofen    Colon polyp     CRF (chronic renal failure), stage 5     Diabetes mellitus     Diverticulosis     ESRD (end stage renal disease) on dialysis     Gastritis     GERD (gastroesophageal reflux disease)     Gout     History of colon polyps 5/3/2018    HTN (hypertension) 3/27/2012    Hyperlipidemia     Hyperlipidemia     LLL pneumonia 6/14/2018    LVH (left ventricular hypertrophy)     Mesenteric ischemia     Murmur, cardiac 3/27/2012    GRICELDA (obstructive sleep apnea)     DOES NOT USE A MACHINE    Sinus problem     Stroke 03/2019    acute left PCA    Syncope and collapse      Past Surgical History:   Procedure Laterality Date    APPENDECTOMY      CARDIAC CATHETERIZATION  2012    LIMA to LAD patent, SVG to RCA    COLONOSCOPY  2011    COLONOSCOPY N/A 5/3/2018    Procedure: COLONOSCOPY;  Surgeon: Messi Harris MD;  Location: [facility];  Service: Endoscopy;  Laterality: N/A;    COLONOSCOPY N/A 8/18/2020     Procedure: COLONOSCOPY;  Surgeon: Messi Harris MD;  Location: [facility] END[facility];  Service: Endoscopy;  Laterality: N/A;    CORONARY ARTERY BYPASS GRAFT  4/2007    x 2 [place]    ESOPHAGOGASTRODUODENOSCOPY N/A 2020    Procedure: EGD (ESOPHAGOGASTRODUODENOSCOPY);  Surgeon: Eben Soto MD;  Location: [facility] END[facility];  Service: Endoscopy;  Laterality: N/A;    JOINT REPLACEMENT      left knee total replacement  X 3    mid leftt finger      from a cactuss    MOLE REMOVAL  2016    rotative cuff      no rotative cuffs on bilat shoulders has pins     SHOULDER SURGERY      shoulder surgery bilat  RIGHT X 4; LEFT X 3     Family History   Problem Relation Age of Onset    Heart disease Mother     Sudden death Father     Cancer Father         advanced lung ca- found after 2 story fall    Stroke Sister     Pneumonia Sister          from PNA    Heart disease Sister     Hypertension Brother     Kidney cancer Neg Hx     Prostate cancer Neg Hx     Urolithiasis Neg Hx     Allergic rhinitis Neg Hx     Allergies Neg Hx     Angioedema Neg Hx     Asthma Neg Hx     Atopy Neg Hx     Eczema Neg Hx     Immunodeficiency Neg Hx     Rhinitis Neg Hx     Urticaria Neg Hx      Social History     Tobacco Use    Smoking status: Never Smoker    Smokeless tobacco: Never Used   Substance Use Topics    Alcohol use: No     Frequency: Never     Drinks per session: Patient refused     Binge frequency: Patient refused    Drug use: No     Review of Systems    Physical Exam     Initial Vitals [20 1222]   BP Pulse Resp Temp SpO2   (!) 194/90 77 18 98 °F (36.7 °C) 100 %      MAP       --         Physical Exam    ED Course   Procedures  Labs Reviewed - No data to display       Imaging Results    None          Medical Decision Making:   ED Management:  80-year-old male returns with constipation.  While in emergency department he had 2 large bowel movements.       APC / Resident Notes:   I, Dr. Hardeep Rouse III,  personally performed the services described in this documentation. All medical record entries made by the scribe were at my direction and in my presence.  I have reviewed the chart and agree that the record reflects my personal performance and is accurate and complete                        Clinical Impression:       ICD-10-CM ICD-9-CM   1. Other constipation  K59.09 564.09                          ED Disposition Condition    Discharge Stable        ED Prescriptions     None        Follow-up Information     Follow up With Specialties Details Why Contact Info    Pk Lakhani MD Family Medicine In 2 days  [address] [facility] [address]  [phone]                                         Hardeep Rouse III, MD  09/30/20 1625

## 2020-09-30 NOTE — TELEPHONE ENCOUNTER
----- Message from Debbi Alvarado sent at 9/30/2020  3:42 PM CDT -----  Regarding: lab orders  Type: Needs Medical Advice  Who Called:  Tonya daughter   Symptoms (please be specific):    How long has patient had these symptoms:    Pharmacy name and phone #:   Best Call Back Number: [phone] (home)    Additional Information:pt daughter Tonya states that home health need orders fax  [phone] to there office for collection on 10/01 pls call to advis

## 2020-09-30 NOTE — ED PROVIDER NOTES
"Encounter Date: 9/30/2020    SCRIBE #1 NOTE: I, Nohemy Helms, am scribing for, and in the presence of, Hardeep Rouse III, MD.       History     Chief Complaint   Patient presents with    Constipation     9 days       Time seen by provider: 12:30 PM on 09/30/2020    Micheal Hunt is a 80 y.o. male with PMHx of diverticulosis, colon polyp, CAD, CRF, ESRD, NICK, GERD, HTN, and HLD who presents to the ED with an onset of constipation x 9 days. Home health nurse reports rectal bleeding and states "stool is packed down there." They have tried an enema with no resolve. The patient denies abdominal pain or any other symptoms at this time. Nurse notes patient has been in the hospital a few times within the last couple months and is typically given pain medication. She initially believed medication may have been contributing to constipation; however, he has not taken any pain medication within the last week. PSHx of colonoscopy and EGD.    The history is provided by a caregiver.     Review of patient's allergies indicates:   Allergen Reactions    Ace inhibitors Other (See Comments)     Cough    Angiotensin ii     Arb-angiotensin receptor antagonist Itching    Eplerenone Other (See Comments)     Marked bradycardia, 40, tiredness and weakness      Gabapentin     Sulfa (sulfonamide antibiotics) Itching and Swelling     Patient says this was 10 years ago and doesn't remember what happened     Past Medical History:   Diagnosis Date    NICK (acute kidney injury) 7/29/2016    Allergy     Anemia, mild 12/15/2014    Arthritis     Gout    Atrial fibrillation 03/2019    Benign essential HTN 3/27/2012    BMI 29.0-29.9,adult 5/10/2018    BPH (benign prostatic hyperplasia)     BPH (benign prostatic hyperplasia)     CAD (coronary artery disease) 2006    Carotid artery occlusion     60-70% FROYLAN, LICA < 50%    Chronic kidney disease     due to ibuprofen    Colon polyp     CRF (chronic renal failure), stage 5     " Diabetes mellitus     Diverticulosis     ESRD (end stage renal disease) on dialysis     Gastritis     GERD (gastroesophageal reflux disease)     Gout     History of colon polyps 5/3/2018    HTN (hypertension) 3/27/2012    Hyperlipidemia     Hyperlipidemia     LLL pneumonia 2018    LVH (left ventricular hypertrophy)     Mesenteric ischemia     Murmur, cardiac 3/27/2012    GRICELDA (obstructive sleep apnea)     DOES NOT USE A MACHINE    Sinus problem     Stroke 2019    acute left PCA    Syncope and collapse      Past Surgical History:   Procedure Laterality Date    APPENDECTOMY      CARDIAC CATHETERIZATION      LIMA to LAD patent, SVG to RCA    COLONOSCOPY      COLONOSCOPY N/A 5/3/2018    Procedure: COLONOSCOPY;  Surgeon: Messi Harris MD;  Location: [facility] END[facility];  Service: Endoscopy;  Laterality: N/A;    COLONOSCOPY N/A 2020    Procedure: COLONOSCOPY;  Surgeon: Messi Harris MD;  Location: [facility] END[facility];  Service: Endoscopy;  Laterality: N/A;    CORONARY ARTERY BYPASS GRAFT  4/2007    x 2 [place]    ESOPHAGOGASTRODUODENOSCOPY N/A 2020    Procedure: EGD (ESOPHAGOGASTRODUODENOSCOPY);  Surgeon: Eben Soto MD;  Location: [facility] ENDO;  Service: Endoscopy;  Laterality: N/A;    JOINT REPLACEMENT      left knee total replacement  X 3    mid leftt finger      from a cactuss    MOLE REMOVAL      rotative cuff      no rotative cuffs on bilat shoulders has pins     SHOULDER SURGERY      shoulder surgery bilat  RIGHT X 4; LEFT X 3     Family History   Problem Relation Age of Onset    Heart disease Mother     Sudden death Father     Cancer Father         advanced lung ca- found after 2 story fall    Stroke Sister     Pneumonia Sister          from PNA    Heart disease Sister     Hypertension Brother     Kidney cancer Neg Hx     Prostate cancer Neg Hx     Urolithiasis Neg Hx     Allergic rhinitis Neg Hx     Allergies Neg Hx     Angioedema Neg Hx      Asthma Neg Hx     Atopy Neg Hx     Eczema Neg Hx     Immunodeficiency Neg Hx     Rhinitis Neg Hx     Urticaria Neg Hx      Social History     Tobacco Use    Smoking status: Never Smoker    Smokeless tobacco: Never Used   Substance Use Topics    Alcohol use: No     Frequency: Never     Drinks per session: Patient refused     Binge frequency: Patient refused    Drug use: No     Review of Systems   Constitutional: Negative for fever.   Respiratory: Negative for shortness of breath.    Gastrointestinal: Positive for anal bleeding and constipation. Negative for abdominal pain.   Genitourinary: Negative for flank pain.   Musculoskeletal: Negative for gait problem.   Neurological: Negative for weakness.   Psychiatric/Behavioral: Negative for confusion.       Physical Exam     Initial Vitals [09/30/20 1222]   BP Pulse Resp Temp SpO2   (!) 194/90 77 18 98 °F (36.7 °C) 100 %      MAP       --         Physical Exam    Nursing note and vitals reviewed.  Constitutional: He appears well-developed and well-nourished.   HENT:   Head: Normocephalic and atraumatic.   Eyes: Conjunctivae are normal.   Neck: Normal range of motion. Neck supple.   Cardiovascular: Normal rate, regular rhythm and normal heart sounds. Exam reveals no gallop and no friction rub.    No murmur heard.  Pulmonary/Chest: Breath sounds normal. No respiratory distress. He has no wheezes. He has no rhonchi. He has no rales.   Abdominal: Soft. He exhibits no distension. There is no abdominal tenderness.   Musculoskeletal: Normal range of motion.   Neurological: He is alert and oriented to person, place, and time.   Skin: Skin is warm and dry.   Psychiatric: He has a normal mood and affect.         ED Course   Procedures  Labs Reviewed - No data to display       Imaging Results    None          Medical Decision Making:   History:   Old Medical Records: I decided to obtain old medical records.            Scribe Attestation:   Scribe #1: I performed the above  scribed service and the documentation accurately describes the services I performed. I attest to the accuracy of the note.                      Clinical Impression:       ICD-10-CM ICD-9-CM   1. Other constipation  K59.09 564.09                      Disposition:   Disposition: Discharged  Condition: Stable     ED Disposition Condition    Discharge Stable        ED Prescriptions     None        Follow-up Information     Follow up With Specialties Details Why Contact Info    Pk Lakhani MD Family Medicine In 2 days  [address] [facility]  [place] [id number] [phone]

## 2020-09-30 NOTE — ED NOTES
Large, formed BM produced by pt., pt. Reports he feels better at this time, HARINDER FOWLER, will continue to monitor.

## 2020-09-30 NOTE — TELEPHONE ENCOUNTER
Please arrange/ or schedule labs to be drawn by home health   CKD (chronic kidney disease), stage IV  -     Comprehensive metabolic panel; Future; Expected date: 09/30/2020  -     PTH, intact; Future; Expected date: 09/30/2020  -     Magnesium; Future; Expected date: 09/30/2020  -     PHOSPHORUS; Future; Expected date: 09/30/2020    Iron deficiency anemia secondary to inadequate dietary iron intake  -     CBC auto differential; Future; Expected date: 09/30/2020  -     Iron and TIBC; Future; Expected date: 09/30/2020    Glucose intolerance (impaired glucose tolerance)  -     Comprehensive metabolic panel; Future; Expected date: 09/30/2020  -     Hemoglobin A1C; Future; Expected date: 09/30/2020

## 2020-10-06 NOTE — PATIENT INSTRUCTIONS

## 2020-10-06 NOTE — PROGRESS NOTES
Subjective:       Patient ID: Micheal Hunt is a 80 y.o. male.    Chief Complaint: Follow-up    81 y/o male encephalopathic, residual after strokes, with irritable, tear and poor impulsivity,and evening.  Visit ED 09/28  Micheal Hunt is a 79 y.o. male with PMH of CVA with left-sided deficits, ESRD on dialysis, and a.fib on eliquia, seizures, hypertension and hyperlipidemia who presents to the ED for evaluation of left lower quadrant abdominal pain  5/10 sharp achy pain associated with diarrhea since yesterday.  He reports LLQ pain increases with movement alleviates with rest.  His caretaker states he began having diarrhea yesterday with 4 episodes during the day.  She states that he is on tube feedings due to dysphagia although he occasionally has soft foods by mouth.  Patient currently receives hemodialysis Monday Wednesday and Friday he did have dialysis the day before.  He has been seen at ED 3 days ago.  Transitional Care Note    Family and/or Caretaker present at visit?  Yes.  Diagnostic tests reviewed/disposition: No diagnosic tests pending after this hospitalization.  Disease/illness education: yes  Home health/community services discussion/referrals: Patient has home health established at d/c.   Establishment or re-establishment of referral orders for community resources: No other necessary community resources.   Discussion with other health care providers: No discussion with other health care providers necessary.           Review of Systems   Constitutional: Negative for fatigue and unexpected weight change.   Respiratory: Negative for chest tightness and shortness of breath.    Cardiovascular: Negative for chest pain, palpitations and leg swelling.   Gastrointestinal: Positive for abdominal pain and diarrhea.   Musculoskeletal: Positive for arthralgias, back pain and gait problem.   Neurological: Negative for dizziness, syncope, light-headedness and headaches.   Psychiatric/Behavioral:  Positive for decreased concentration and dysphoric mood.       Patient Active Problem List   Diagnosis    Osteoarthritis of right knee    Nocturia    Carotid artery stenosis    Essential hypertension    Hyperlipidemia    Vertigo    CAD (coronary artery disease), 2V CABG 2007    Gout, arthritis    Obesity, Class I, BMI 32.8 to 34.3, 32.7, today 30.4    Dizziness    LVH (left ventricular hypertrophy) due to hypertensive disease    Abnormal cardiovascular stress test    GERD (gastroesophageal reflux disease)    Elevated PSA    Acquired hammer toe    Diastolic dysfunction    Abdominal obesity    Back pain, chronic    Glucose intolerance (impaired glucose tolerance)    Obstructive sleep apnea    Anemia of chronic disease    Gastric nodule    Diverticulitis, 2005, 2015    Personal history of colonic polyps    PSA elevation    DDD (degenerative disc disease), lumbar    LV dysfunction, EF 50%, reduced to 34%, now 46%    Other spondylosis, lumbar region    Knee pain    Benign prostatic hyperplasia without lower urinary tract symptoms    Itching, both legs, onset 7/2017    Chronic sinusitis    Sigmoid diverticulitis, 3 episodes, 2005, 2015, 2018    Anemia due to chronic kidney disease, on chronic dialysis    Renal cyst    Unilateral inguinal hernia    Chronic kidney disease, stage 5    Generalized abdominal pain    Aortic stenosis    Secondary hyperparathyroidism    Erythropoietin (EPO) stimulating agent anemia management patient    Enteritis    CRF (chronic renal failure), stage 5    Hyponatremia    Hypocalcemia    Mild malnutrition    Mild asthma with acute exacerbation    Iron deficiency anemia    Dialysis patient, onset 11/2018    Pain of left hand    Immune deficiency disorder    Aortic calcification    Persistent atrial fibrillation    ESRD (end stage renal disease)    NSVT (nonsustained ventricular tachycardia)    Long term (current) use of anticoagulants     Acute on chronic diastolic congestive heart failure    Type 2 diabetes mellitus with renal complication    Hypertension due to end stage renal disease caused by type 2 diabetes mellitus, on dialysis    Pulmonary hypertension    NSTEMI (non-ST elevated myocardial infarction)    ESRD (end stage renal disease) on dialysis    Cerebrovascular accident (CVA) due to occlusion of left posterior cerebral artery    Atrial fibrillation    Patent foramen ovale    Cellulitis of right arm    Dysarthria due to acute cerebrovascular accident (CVA)    Encephalopathy    Groin hematoma    Cytotoxic cerebral edema    Umbilical hernia without obstruction and without gangrene    Umbilical hernia    Sprain of right wrist    Right homonymous hemianopsia due to recent cerebral infarction    RABIA (internuclear ophthalmoplegia), left    History of stroke    Chronic right shoulder pain    Morbid (severe) obesity due to excess calories    Hemiplegia and hemiparesis following cerebral infarction affecting right dominant side    COPD with asthma    Oropharyngeal dysphagia    Cognitive communication disorder    Esophageal dysphagia    Gastrointestinal hemorrhage    Hyperkalemia    PEG (percutaneous endoscopic gastrostomy) status    Septic shock    Seizure    Goals of care, counseling/discussion    Pancreatitis    Hemiparesis, aphasia, and dysphagia as late effects of stroke    PVD (peripheral vascular disease)       Objective:      Physical Exam  Vitals signs reviewed.   Constitutional:       General: He is not in acute distress.     Appearance: He is well-developed. He is not diaphoretic.   HENT:      Head: Normocephalic and atraumatic.      Right Ear: External ear normal.      Left Ear: External ear normal.      Nose: Nose normal.      Mouth/Throat:      Pharynx: No oropharyngeal exudate.   Eyes:      General: No scleral icterus.        Right eye: No discharge.         Left eye: No discharge.       Conjunctiva/sclera: Conjunctivae normal.      Pupils: Pupils are equal, round, and reactive to light.   Neck:      Musculoskeletal: Normal range of motion and neck supple.      Thyroid: No thyromegaly.      Vascular: No JVD.      Trachea: No tracheal deviation.   Cardiovascular:      Rate and Rhythm: Normal rate.      Heart sounds: Normal heart sounds. No murmur.   Pulmonary:      Effort: Pulmonary effort is normal. No respiratory distress.      Breath sounds: Normal breath sounds. No wheezing or rales.   Abdominal:      General: Bowel sounds are normal. There is no distension.      Palpations: Abdomen is soft. There is no mass.      Tenderness: There is no abdominal tenderness. There is no guarding or rebound.   Musculoskeletal:         General: No tenderness.      Comments:        Lymphadenopathy:      Cervical: No cervical adenopathy.   Skin:     General: Skin is warm and dry.      Coloration: Skin is not pale.      Findings: No erythema or rash.   Neurological:      Mental Status: He is alert and oriented to person, place, and time.      GCS: GCS eye subscore is 4. GCS verbal subscore is 4. GCS motor subscore is 6.      Cranial Nerves: No cranial nerve deficit.      Sensory: Sensory deficit present.      Motor: Weakness and tremor present.      Coordination: Coordination normal.      Deep Tendon Reflexes:      Reflex Scores:       Tricep reflexes are 0 on the right side and 2+ on the left side.       Bicep reflexes are 0 on the right side and 2+ on the left side.       Brachioradialis reflexes are 0 on the right side and 2+ on the left side.       Patellar reflexes are 2+ on the left side.  Psychiatric:         Attention and Perception: Attention normal.         Mood and Affect: Mood is anxious. Affect is labile.         Speech: Speech is tangential.         Behavior: Behavior normal.         Thought Content: Thought content normal.         Cognition and Memory: Cognition is impaired. Memory is impaired.          Judgment: Judgment is impulsive.         Lab Results   Component Value Date    WBC 6.84 09/29/2020    HGB 11.1 (L) 09/29/2020    HCT 33.8 (L) 09/29/2020     (L) 09/29/2020    CHOL 89 (L) 07/26/2018    TRIG 68 07/26/2018    HDL 29 (L) 07/26/2018    ALT 12 09/29/2020    AST 16 09/29/2020     09/29/2020    K 4.5 09/29/2020    CL 98 09/29/2020    CREATININE 7.4 (H) 09/29/2020    BUN 52 (H) 09/29/2020    CO2 28 09/29/2020    TSH 0.031 (L) 03/20/2019    PSA 5.6 (H) 07/26/2018    INR 1.0 08/16/2020    HGBA1C 8.3 (H) 03/21/2019     The ASCVD Risk score (Bhavin ALVAREZ Jr., et al., 2013) failed to calculate for the following reasons:    The 2013 ASCVD risk score is only valid for ages 40 to 79    The patient has a prior MI or stroke diagnosis    Assessment:       1. Seizure disorder    2. Glucose intolerance (impaired glucose tolerance)    3. Spastic hemiplegia of right dominant side as late effect of cerebral infarction    4. Aortic calcification    5. Reactive depression        Plan:       Seizure disorder    Glucose intolerance (impaired glucose tolerance)    Spastic hemiplegia of right dominant side as late effect of cerebral infarction    Aortic calcification    Reactive depression  -     sertraline (ZOLOFT) 25 MG tablet; Take 1 tablet (25 mg total) by mouth every evening.  Dispense: 30 tablet; Refill: 3      Discussed health maintenance guidelines appropriate for age.

## 2020-10-06 NOTE — TELEPHONE ENCOUNTER
Patient was seen today for an office visit, patient's caretaker stated that the patients tube keeps coming out during the night feeding and would like a Arturo Button put in. Dr. Lakhani asked me to reach out to Dr. Harris's staff to see what can be done. Please advise.

## 2020-10-22 NOTE — LETTER
October 22, 2020      Sarina Benson, NICO  [address]  [place] [address]           [facility]  [address]  [facility] [address]  Phone: [phone]          Patient: Micheal Hunt   MR Number: [id number]   YOB: 1940   Date of Visit: 10/22/2020       Dear Sarina eBnson:    Thank you for referring Micheal Hunt to me for evaluation. Attached you will find relevant portions of my assessment and plan of care.    If you have questions, please do not hesitate to call me. I look forward to following Micheal Hunt along with you.    Sincerely,    Messi Harris MD    Enclosure  CC:  No Recipients    If you would like to receive this communication electronically, please contact [email or web address] or [phone] to request more information on [employer] Link access.    For providers and/or their staff who would like to refer a patient to [facility], please contact us through our one-stop-shop provider referral line, [facility] , at [phone].    If you feel you have received this communication in error or would no longer like to receive these types of communications, please e-mail [email or web address]

## 2020-10-22 NOTE — PROGRESS NOTES
Subjective:       Patient ID: Micheal Hunt is a 80 y.o. male.    This is an established patient.      Chief Complaint: Dysphagia    PAST HISTORY:    Patient seen for dysphagia, occurring mostly with solid foods, frequency often, alleviated/exacerbated by nothing in particular, associated signs/symptoms including choking, onset remote.  He has had a CVA and is wheelchair bound.  His family member is with him and relates most of the history, including that of cervical bone spurs for which he was supposed to see surgery.  He has not had recent EGD however, on past exam he had gastric nodules followed by EUS for which he is now due.  No other acute complaints.        INTERVAL HISTORY:  Patient presents with caregiver for evaluation for PEG tube malfunction, characterized by leaking around tube and past dislodgement with nocturnal feedings.  Patient and caregiver would like to get measured for YAMIL-KEY tube.  No pain at site.  No other problems.  He is able to take some feeding orally but still uses the tube for nutrition.    Review of Systems   HENT: Positive for trouble swallowing.    Respiratory: Negative for cough, shortness of breath and wheezing.    Cardiovascular: Negative for chest pain and palpitations.   Skin: Negative for color change.   Neurological: Negative for numbness.   Psychiatric/Behavioral: Negative for confusion. The patient is not nervous/anxious.    All other systems reviewed and are negative.      Objective:       Vitals:    10/22/20 1529   BP: 128/82   Pulse: 76       Physical Exam  Vitals signs reviewed.   Constitutional:       Appearance: He is well-developed.      Comments: Wheelchair bound     HENT:      Head: Normocephalic and atraumatic.   Eyes:      General: No scleral icterus.     Pupils: Pupils are equal, round, and reactive to light.   Neck:      Musculoskeletal: Normal range of motion and neck supple.      Thyroid: No thyromegaly.   Cardiovascular:      Rate and Rhythm: Normal  rate and regular rhythm.      Heart sounds: No murmur.   Pulmonary:      Effort: Pulmonary effort is normal.      Breath sounds: Normal breath sounds. No wheezing.   Abdominal:      General: Bowel sounds are normal. There is no distension.      Palpations: Abdomen is soft.      Tenderness: There is no abdominal tenderness.      Comments: Tube site C/D/I.  External bumper at 2 cm   Lymphadenopathy:      Cervical: No cervical adenopathy.   Neurological:      Mental Status: He is alert.      Comments: + RUE/RLE weakness             Lab Results   Component Value Date    WBC 6.93 05/08/2019    HGB 9.4 (L) 05/08/2019    HCT 27.8 (L) 05/08/2019    MCV 95 05/08/2019     (L) 05/08/2019         Past EUS and EGD reports reviewed.      Assessment:       1. PEG (percutaneous endoscopic gastrostomy) status    2. Personal history of colonic polyps    3. Sigmoid diverticulitis, 3 episodes, 2005, 2015, 2018        Plan:       1.  Antireflux precautions including avoidance of late night eating and lying down soon after eating.     2.  Will order 20 Fr x 2 cm YAMIL-KEY tube.  3.  Schedule for EGD once tube arrives  4.  Further recommendations to follow after above.

## 2020-10-24 NOTE — PROGRESS NOTES
"[facility] Hospital Medicine  Progress Note    Patient Name: Micheal Hunt  MRN: [id number]  Patient Class: IP- Inpatient   Admission Date: 8/21/2020  Length of Stay: 2 days  Attending Physician: No att. providers found  Primary Care Provider: Pk Lakhani MD        Subjective:     Principal Problem:Septic shock        HPI:  Micheal Hunt is an 79-year-old male with past medical history of end-stage renal disease on HD, hypertension, atrial fibrillation on home Eliquis, GERD, BPH, and previous CVA who presents to the emergency room tonight with reports of fever.  Information for HPI obtained from ER physician note as patient is a poor historian.  Patient with obvious disorientation and confusion upon my initial interview and physical exam, per ER physician this is reported as patient's baseline.  Per ER physician note, Micheal Hunt is a 79 y.o. male with PMHx of CAD, HTN, HLD, A-fib, and ESRD who presents to the ED via EMS for evaluation of fever and "feeling generally unwell". Patient had dialysis this morning. This afternoon, it was noted by family that patient had a fever of 103 at home. He denies chest pain, SOB, abdominal pain, N/V/D, recent cough, congestion, or other new symptoms. Patient has a history of stroke and chronic right sided weakness secondary to the stroke. He is still able to produce urine, denies pain with urination. Patient has no other medical concerns or complaints at this moment. PSHx includes CABG and EGD."  Patient not accompanied by any visitors during my initial interview and physical exam.  In the emergency room patient noted to meet septic shock criteria.  Patient was given 1 L normal saline in the emergency room and initiated on IV vancomycin and Zosyn.  Chest x-ray not revealing any acute intrathoracic process.  Patient urine unable to be obtained, patient is end-stage renal disease patient.  Patient admitted to ICU on 08/22/2020 " approximately 2:30 a.m..  I discussed patient case via telephone with his daughter, Tonya, who reports that patient confusion is at baseline. She states that patient lives alone, however his home is located in close proximity to her home and he has professional caregivers present throughout the day. She states that he does not wear supplemental oxygen at home and can take steps with a walker and maximum assistance.  PARTIAL CODE status verified with patient's daughter via telephone.        Overview/Hospital Course:  No notes on file     Interval History:  Patient is in intensive care unit for management of septic shock.  Patient is doing well.  Blood pressure has improved.  Patient remains afebrile.  No leukocytosis is noted.  Patient exhibits intermittent confusion.  Patient is scheduled for hemodialysis therapy today.  Patient denies any chest pain or shortness of breath.     Review of Systems   Unable to perform ROS: Acuity of condition (Patient with baseline confusion - unable to provide ROS)      Objective:      Vital Signs (Most Recent):  Temp: 98.4 °F (36.9 °C) (08/23/20 0322)  Pulse: (!) 55 (08/23/20 0700)  Resp: (!) 22 (08/23/20 0700)  BP: 109/60 (08/23/20 0700)  SpO2: 98 % (08/23/20 0700) Vital Signs (24h Range):  Temp:  [97.4 °F (36.3 °C)-98.5 °F (36.9 °C)] 98.4 °F (36.9 °C)  Pulse:  [] 55  Resp:  [17-48] 22  SpO2:  [90 %-100 %] 98 %  BP: ()/(54-74) 109/60      Weight: 82.5 kg (181 lb 14.1 oz)  Body mass index is 24 kg/m².     Intake/Output Summary (Last 24 hours) at 8/23/2020 0950  Last data filed at 8/23/2020 0800      Gross per 24 hour   Intake 1015 ml   Output 0 ml   Net 1015 ml      Physical Exam  Vitals signs and nursing note reviewed.   Constitutional:       General: He is not in acute distress.     Appearance: He is well-developed. He is not diaphoretic.   HENT:      Head: Normocephalic and atraumatic.   Eyes:      General:         Right eye: No discharge.         Left eye: No  discharge.      Pupils: Pupils are equal, round, and reactive to light.      Comments: Eyeglasses in use   Neck:      Musculoskeletal: Normal range of motion.      Vascular: No JVD.   Cardiovascular:      Rate and Rhythm: Normal rate. Rhythm irregular.      Heart sounds: Murmur present.      Comments: Irregular, regular.  Consistent with atrial fibrillation.  Murmur present.  Pulmonary:      Effort: Pulmonary effort is normal. No respiratory distress.      Breath sounds: Normal breath sounds. No wheezing or rales.      Comments: Patient on room air during my initial interview and physical exam, patient in no acute respiratory distress.  Chest:      Chest wall: No tenderness.   Abdominal:      General: Bowel sounds are normal. There is no distension.      Palpations: Abdomen is soft.      Tenderness: There is no abdominal tenderness. There is no guarding or rebound.      Comments: Peg tube in place.   Genitourinary:     Comments: Exam Deferred     Musculoskeletal: Normal range of motion.         General: No tenderness or deformity.   Skin:     General: Skin is warm and dry.      Capillary Refill: Capillary refill takes 2 to 3 seconds.      Findings: Bruising present. No erythema or rash.      Comments: Left upper extremity dialysis graft present, not accessed.  Positive thrill positive bruit.   Neurological:      Mental Status: He is alert.      Comments: Patient is awake, alert.  Patient oriented to self.  Patient disoriented to location, situation, and time.  Patient follows commands.  No evidence of facial droop.  Spontaneous movement noted to all 4 extremities.   Psychiatric:      Comments: Psychiatric assessment limited as patient has altered mental status, reportedly at baseline.            Significant Labs:   CBC:        Recent Labs   Lab 08/21/20 2326 08/23/20  0339   WBC 13.65* 7.55   HGB 9.6* 7.8*   HCT 28.5* 24.7*    134*      CMP:         Recent Labs   Lab 08/21/20 2325 08/22/20  1327  08/23/20  0339     --  134*   K 4.7  --  4.2     --  104   CO2 25  --  19*   GLU 87  --  107   BUN 18  --  31*   CREATININE 4.4* 5.1* 6.1*   CALCIUM 8.9  --  8.3*   PROT 7.1  --  5.9*   ALBUMIN 3.5  --  2.9*   BILITOT 0.4  --  0.4   ALKPHOS 92  --  67   AST 9*  --  6*   ALT 9*  --  6*   ANIONGAP 9  --  11   EGFRNONAA 12* 10* 8*               Microbiology Results (last 7 days)      Procedure Component Value Units Date/Time     Blood culture x two cultures. Draw prior to antibiotics. [[id number]] Collected: 08/21/20 2325     Order Status: Completed Specimen: Blood Updated: 08/22/20 1715       Blood Culture, Routine No Growth to date     Narrative:       Aerobic and anaerobic     Blood culture x two cultures. Draw prior to antibiotics. [[id number]] Collected: 08/21/20 2325     Order Status: Completed Specimen: Blood from Antecubital, Right Hand Updated: 08/22/20 1715       Blood Culture, Routine No Growth to date     Narrative:       Aerobic and anaerobic     Influenza A & B by Molecular [[id number]] Collected: 08/22/20 0114     Order Status: Completed Specimen: Nasopharyngeal Swab Updated: 08/22/20 0136       Influenza A, Molecular Negative       Influenza B, Molecular Negative       Flu A & B Source Nasal swab          Significant Imaging:   CXR: No evidence of acute chest disease since the prior exam.      Assessment/Plan:      * Septic shock  This patient does have evidence of infective focus  My overall impression is septic shock.  Antibiotics given-   IV Vancomycin and IV Zosyn       Latest lactate reviewed, they are-  Recent Labs   Lab 08/21/20 2325 08/22/20  0147   LACTATE 2.3* 2.1       Organ dysfunction indicated by Encephalopathy and Acute respiratory failure  Source- Unknown - Chest xray WNL, Urine pending, Blood cultures obtained and pending.  Source control Achieved by- IV Vancomycin and IV Zosyn    Patient given 1L NS in ER. Admit to ICU for further monitoring.      Goals of care,  counseling/discussion  Advance Care Planning     Living Will  During this visit, I engaged the healthcare power of   (daughter Miss Castaneda) in the advance care planning process.  The patient and I reviewed the role for advance directives and their purpose in directing future healthcare if the patient's unable to speak for him/herself.  At this point in time, the patient does have full decision-making capacity.  We discussed different extreme health states that he could experience, and reviewed what kind of medical care he would want in those situations.  The healthcare power of   (daughter Miss Castaneda)communicated that if he were comatose and had little chance of a meaningful recovery, he would not want machines/life-sustaining treatments used.  I spent a total of 20 minutes engaging the patient in this advance care planning discussion.          Seizure  Chronic, controlled.  Continue home Keppra.  Seizure precautions.      COPD with asthma  Chronic, controlled.  Will utilize DuoNebs as needed.  Patient denies respiratory complaints upon admission to the hospital.  Continuous telemetry and SpO2 monitoring.  Utilize supplemental oxygen to maintain SpO2 greater than 90%.      Atrial fibrillation  Chronic, controlled. Continue home Eliquis. Continuous telemetry monitoring. EKG obtained in ER and reviewed.        Cerebrovascular accident (CVA) due to occlusion of left posterior cerebral artery  Noted, with residual right-sided weakness.  Fall precautions, aspiration precautions, neuro checks. Patient has a PEG tube present, unable to determine home PO diet regimen, will order swallow evaluation for further recommendations.        ESRD (end stage renal disease) on dialysis  Creatine stable for now. BMP reviewed- noted Estimated Creatinine Clearance: 14.9 mL/min (A) (based on SCr of 4.4 mg/dL (H)). according to latest data. Monitor UOP and serial BMP and adjust therapy as needed. Renally dose  meds.            Long term (current) use of anticoagulants  Noted, continue home Eliquis for anticoagulation in the setting of atrial fibrillation.  Bleeding precautions.      Anemia due to chronic kidney disease, on chronic dialysis  Monitor and transfuse if patient becomes hemodynamically unstable or H/H < 7/21  Daily CBC.       Benign prostatic hyperplasia without lower urinary tract symptoms  Chronic, controlled.  Will continue home medication.      CAD (coronary artery disease), 2V CABG 2007  Chronic, controlled.  Will continue home medication.  Continuous telemetry monitoring.      Hyperlipidemia  Chronic, controlled. Will continue home medication.    Lab Results   Component Value Date    CHOL 89 (L) 07/26/2018    CHOL 102 (L) 04/25/2018    CHOL 116 (L) 03/10/2017     Lab Results   Component Value Date    HDL 29 (L) 07/26/2018    HDL 31 (L) 04/25/2018    HDL 29 (L) 03/10/2017     Lab Results   Component Value Date    LDLCALC 46.4 (L) 07/26/2018    LDLCALC 47.6 (L) 04/25/2018    LDLCALC 62.8 (L) 03/10/2017     Lab Results   Component Value Date    TRIG 68 07/26/2018    TRIG 117 04/25/2018    TRIG 121 03/10/2017     Lab Results   Component Value Date    CHOLHDL 32.6 07/26/2018    CHOLHDL 30.4 04/25/2018    CHOLHDL 25.0 03/10/2017               Essential hypertension  Chronic, controlled.  Latest blood pressure and vitals reviewed-   Temp:  [97.6 °F (36.4 °C)-102.6 °F (39.2 °C)]   Pulse:  [67-95]   Resp:  [18]   BP: ()/(50-74)   SpO2:  [93 %-98 %] .   Home meds for hypertension were reviewed and noted below. Hospital anti-hypertensive changes were made as shown below.  Hypertension Medications             carvediloL (COREG) 12.5 MG tablet Take 6.5 tablets (81.25 mg total) by mouth 2 (two) times daily.        Hold Coreg upon admission in the setting of acute hypotension secondary to septic shock.    Discussed with patient's daughter, answered all the questions.  Patient would require tele-sitter. Transfer to  medicine floor.   VTE Risk Mitigation (From admission, onward)         Ordered     IP VTE HIGH RISK PATIENT  Once      08/22/20 0825                Discharge Planning   MARKELL: 8/24/2020     Code Status: Prior   Is the patient medically ready for discharge?:     Reason for patient still in hospital (select all that apply): Patient trending condition  Discharge Plan A: Home Health            Critical care time spent on the evaluation and treatment of severe organ dysfunction, review of pertinent labs and imaging studies, discussions with consulting providers and discussions with patient/family: 34 minutes.      Charissa Olivas MD  Department of Hospital Medicine   [facility]

## 2020-10-28 NOTE — TELEPHONE ENCOUNTER
----- Message from Latoya Loomis sent at 10/28/2020 10:49 AM CDT -----  Type: Needs order for home health services    Who Called:  [facility]  Best Call Back Number: call [phone] or fax to [phone]  Additional Information:  Missing order for home health services/please send or call back to advise.

## 2020-10-28 NOTE — TELEPHONE ENCOUNTER
----- Message from Latoya Loomis sent at 10/28/2020 10:49 AM CDT -----  Type: Needs order for home health services    Who Called:  [facility]  Best Call Back Number: call [phone] or fax to [phone]  Additional Information:  Missing order for home health services/please send or call back to advise.

## 2020-11-04 NOTE — ASSESSMENT & PLAN NOTE
CANDY FLORIAN    Patient Age: 59 year old   Refill request by: Pharmacy fax  Refill to be: ePrescribed to [facility] [address] [facility] pharmacy    Medication requested to be refilled:   gabapentin (NEURONTIN) 300 MG capsule       WEIGHT AND HEIGHT:   Wt Readings from Last 1 Encounters:   09/03/20 55.2 kg (121 lb 12.8 oz)     Ht Readings from Last 1 Encounters:   09/03/20 5' 8\" (1.727 m)     BMI Readings from Last 1 Encounters:   09/03/20 18.52 kg/m²       ALLERGIES:  Patient has no known allergies.  Current Outpatient Medications   Medication   • diclofenac (VOLTAREN) 1 % gel   • montelukast (SINGULAIR) 10 MG tablet   • Marijuana for Medical Use   • meloxicam (MOBIC) 15 MG tablet   • gabapentin (NEURONTIN) 300 MG capsule   • pramipexole (MIRAPEX) 0.5 MG tablet   • pramipexole 1.5 MG extended-release tablet   • carbidopa-levodopa (SINEMET CR)  MG per CR tablet   • albuterol (PROAIR RESPICLICK) 108 (90 Base) MCG/ACT inhaler     No current facility-administered medications for this visit.      PHARMACY to use: SEE BELOW          Pharmacy preference(s) on file:   [id number] - [place] [address] [facility] [place] [address] [place] [address] [place] [address]  Phone: [phone] Fax: [phone]      CALL BACK INFO: Ok to leave response (including medical information) on answering machine  ROUTING: Patient's physician/staff        PCP: Imer Jay MD         INS: Payor: [employer] / Plan: [employer] / Product Type: [place] Risk   PATIENT ADDRESS:  [address] [place] [address] [place] [address]   -  Hospital Medicine following for co-management--> on IV Cefepime

## 2020-11-05 PROBLEM — Z43.1 PEG (PERCUTANEOUS ENDOSCOPIC GASTROSTOMY) ADJUSTMENT/REPLACEMENT/REMOVAL: Status: ACTIVE | Noted: 2020-01-01

## 2020-11-05 NOTE — TRANSFER OF CARE
Anesthesia Transfer of Care Note    Patient: Micheal Hunt    Procedure(s) Performed: Procedure(s) (LRB):  EGD (ESOPHAGOGASTRODUODENOSCOPY)(PEG removal and Arturo placement (N/A)  INSERTION OR REPLACEMENT, JEJUNOSTOMY TUBE, PERCUTANEOUS, ENDOSCOPIC (N/A)    Patient location: GI    Anesthesia Type: general    Transport from OR: Transported from OR on room air with adequate spontaneous ventilation    Post pain: adequate analgesia    Post assessment: no apparent anesthetic complications and tolerated procedure well    Post vital signs: stable    Level of consciousness: sedated    Nausea/Vomiting: no nausea/vomiting    Complications: none    Transfer of care protocol was followed      Last vitals:   Visit Vitals  /72   Pulse 76   Temp 36.5 °C (97.7 °F)   Resp (!) 26   Wt 83.9 kg (185 lb)   SpO2 99%   BMI 24.41 kg/m²

## 2020-11-05 NOTE — [employer] PATIENT INSTRUCTIONS
Discharge Summary/Instructions after an Endoscopic Procedure  Patient Name: Micheal Hunt  Patient MRN: [id number]  Patient YOB: 1940 Thursday, November 5, 2020  Messi Madrid MD  RESTRICTIONS:  During your procedure today, you received medications for sedation.  These   medications may affect your judgment, balance and coordination.  Therefore,   for 24 hours, you have the following restrictions:   - DO NOT drive a car, operate machinery, make legal/financial decisions,   sign important papers or drink alcohol.    ACTIVITY:  Today: no heavy lifting, straining or running due to procedural   sedation/anesthesia.  The following day: return to full activity including work.  DIET:  Eat and drink normally unless instructed otherwise.     TREATMENT FOR COMMON SIDE EFFECTS:  - Mild abdominal pain, nausea, belching, bloating or excessive gas:  rest,   eat lightly and use a heating pad.  - Sore Throat: treat with throat lozenges and/or gargle with warm salt   water.  - Because air was used during the procedure, expelling large amounts of air   from your rectum or belching is normal.  - If a bowel prep was taken, you may not have a bowel movement for 1-3 days.    This is normal.  SYMPTOMS TO WATCH FOR AND REPORT TO YOUR PHYSICIAN:  1. Abdominal pain or bloating, other than gas cramps.  2. Chest pain.  3. Back pain.  4. Signs of infection such as: chills or fever occurring within 24 hours   after the procedure.  5. Rectal bleeding, which would show as bright red, maroon, or black stools.   (A tablespoon of blood from the rectum is not serious, especially if   hemorrhoids are present.)  6. Vomiting.  7. Weakness or dizziness.  GO DIRECTLY TO THE NEAREST EMERGENCY ROOM IF YOU HAVE ANY OF THE FOLLOWING:      Difficulty breathing              Chills and/or fever over 101 F   Persistent vomiting and/or vomiting blood   Severe abdominal pain   Severe chest pain   Black, tarry stools   Bleeding- more than one  tablespoon   Any other symptom or condition that you feel may need urgent attention  Your doctor recommends these additional instructions:  If any biopsies were taken, your doctors clinic will contact you in 1 to 2   weeks with any results.  - Patient has a contact number available for emergencies.  The signs and   symptoms of potential delayed complications were discussed with the   patient.  Return to normal activities tomorrow.  Written discharge   instructions were provided to the patient.   - Resume previous diet.   - Continue present medications.   - Resume Eliquis (apixaban) at prior dose today.   - Discharge patient to home (ambulatory).   - Please follow the post-PEG recommendations including: start using PEG   today.   - Return to my office PRN.  For questions, problems or results please call your physician - Messi Madrid MD at Work:  [phone].  [facility], EMERGENCY ROOM PHONE NUMBER: [phone]  IF A COMPLICATION OR EMERGENCY SITUATION ARISES AND YOU ARE UNABLE TO REACH   YOUR PHYSICIAN - GO DIRECTLY TO THE EMERGENCY ROOM.  Messi Madrid MD  11/5/2020 11:30:28 AM  This report has been verified and signed electronically.  [employer]

## 2020-11-05 NOTE — ANESTHESIA PREPROCEDURE EVALUATION
11/05/2020  Micheal Hunt is a 80 y.o., male.    Pre-op Assessment    I have reviewed the Patient Summary Reports.     I have reviewed the Nursing Notes. I have reviewed the NPO Status.   I have reviewed the Medications.     Review of Systems  Anesthesia Hx:  No problems with previous Anesthesia  Denies Family Hx of Anesthesia complications.  Personal Hx of Anesthesia complications   Social:  Non-Smoker    Hematology/Oncology:         -- Anemia:   Cardiovascular:   Hypertension, well controlled Past MI CAD asymptomatic CABG/stent  CHF CORONARY ARTERY BYPASS GRAFT Carotoid Artery Disease    Pulmonary:   Pneumonia COPD, mild Asthma Sleep Apnea    Renal/:   Chronic Renal Disease, CRI    Hepatic/GI:   Bowel Prep. GERD    Musculoskeletal:   Arthritis     Neurological:   CVA    Endocrine:   Diabetes, poorly controlled, type 2        Physical Exam  General:  Well nourished    Airway/Jaw/Neck:  Airway Findings: Mouth Opening: Normal Tongue: Normal  General Airway Assessment: Adult, Good  Mallampati: II  Improves to II with phonation.  TM Distance: 4-6 cm      Dental:  Dental Findings: In tact   Chest/Lungs:  Chest/Lungs Findings: Normal Respiratory Rate     Heart/Vascular:  Heart Findings: Rate: Normal  Rhythm: Regular Rhythm  Heart murmur: negative       Mental Status:  Mental Status Findings:  Cooperative, Alert and Oriented         Anesthesia Plan  Type of Anesthesia, risks & benefits discussed:  Anesthesia Type:  general  Patient's Preference:   Intra-op Monitoring Plan:   Intra-op Monitoring Plan Comments:   Post Op Pain Control Plan:   Post Op Pain Control Plan Comments:   Induction:   IV  Beta Blocker:  Patient is on a Beta-Blocker and has received one dose within the past 24 hours (No further documentation required).       Informed Consent: Patient understands risks and agrees with Anesthesia plan.   Questions answered. Anesthesia consent signed with patient.  ASA Score: 4     Day of Surgery Review of History & Physical:    H&P update referred to the provider.         Ready For Surgery From Anesthesia Perspective.

## 2020-11-05 NOTE — PLAN OF CARE
Dr. Harris came to the bedside and reassessed patient. Said it was okay to discharge patient with 9/10 pain since pain is only at PEG site.

## 2020-11-05 NOTE — PLAN OF CARE
Pt alert and oriented. Gait unstable as per baseline. Pain only at PEG site 9/10 even after tylenol IV and morphine IV. Abdomen soft and nontender all places except at PEG site, denies nausea. Caretaker Maya Zabala and pt given discharge instructions. Pt to resume medications today including eliquis. Caregiver understands PEG can be used today. Pt assisted to wheelchair by caregiver and is ready to trasport downstairs to car at registration.

## 2020-11-05 NOTE — DISCHARGE INSTRUCTIONS
"Discharge Instructions: After Your Surgery/Procedure  Youve just had surgery. During surgery you were given medicine called anesthesia to keep you relaxed and free of pain. After surgery you may have some pain or nausea. This is common. Here are some tips for feeling better and getting well after surgery.     Stay on schedule with your medication.   Going home  Your doctor or nurse will show you how to take care of yourself when you go home. He or she will also answer your questions. Have an adult family member or friend drive you home.      For your safety we recommend these precaution for the first 24 hours after your procedure:  · Do not drive or use heavy equipment.  · Do not make important decisions or sign legal papers.  · Do not drink alcohol.  · Have someone stay with you, if needed. He or she can watch for problems and help keep you safe.  · Your concentration, balance, coordination, and judgement may be impaired for many hours after anesthesia.  Use caution when ambulating or standing up.     · You may feel weak and "washed out" after anesthesia and surgery.      Subtle residual effects of general anesthesia or sedation with regional / local anesthesia can last more than 24 hours.  Rest for the remainder of the day or longer if your Doctor/Surgeon has advised you to do so.  Although you may feel normal within the first 24 hours, your reflexes and mental ability may be impaired without you realizing it.  You may feel dizzy, lightheaded or sleepy for 24 hours or longer.      Be sure to go to all follow-up visits with your doctor. And rest after your surgery for as long as your doctor tells you to.  Coping with pain  If you have pain after surgery, pain medicine will help you feel better. Take it as told, before pain becomes severe. Also, ask your doctor or pharmacist about other ways to control pain. This might be with heat, ice, or relaxation. And follow any other instructions your surgeon or nurse gives " you.  Tips for taking pain medicine  To get the best relief possible, remember these points:  · Pain medicines can upset your stomach. Taking them with a little food may help.  · Most pain relievers taken by mouth need at least 20 to 30 minutes to start to work.  · Taking medicine on a schedule can help you remember to take it. Try to time your medicine so that you can take it before starting an activity. This might be before you get dressed, go for a walk, or sit down for dinner.  · Constipation is a common side effect of pain medicines. Call your doctor before taking any medicines such as laxatives or stool softeners to help ease constipation. Also ask if you should skip any foods. Drinking lots of fluids and eating foods such as fruits and vegetables that are high in fiber can also help. Remember, do not take laxatives unless your surgeon has prescribed them.  · Drinking alcohol and taking pain medicine can cause dizziness and slow your breathing. It can even be deadly. Do not drink alcohol while taking pain medicine.  · Pain medicine can make you react more slowly to things. Do not drive or run machinery while taking pain medicine.  Your health care provider may tell you to take acetaminophen to help ease your pain. Ask him or her how much you are supposed to take each day. Acetaminophen or other pain relievers may interact with your prescription medicines or other over-the-counter (OTC) drugs. Some prescription medicines have acetaminophen and other ingredients. Using both prescription and OTC acetaminophen for pain can cause you to overdose. Read the labels on your OTC medicines with care. This will help you to clearly know the list of ingredients, how much to take, and any warnings. It may also help you not take too much acetaminophen. If you have questions or do not understand the information, ask your pharmacist or health care provider to explain it to you before you take the OTC medicine.  Managing  nausea  Some people have an upset stomach after surgery. This is often because of anesthesia, pain, or pain medicine, or the stress of surgery. These tips will help you handle nausea and eat healthy foods as you get better. If you were on a special food plan before surgery, ask your doctor if you should follow it while you get better. These tips may help:  · Do not push yourself to eat. Your body will tell you when to eat and how much.  · Start off with clear liquids and soup. They are easier to digest.  · Next try semi-solid foods, such as mashed potatoes, applesauce, and gelatin, as you feel ready.  · Slowly move to solid foods. Dont eat fatty, rich, or spicy foods at first.  · Do not force yourself to have 3 large meals a day. Instead eat smaller amounts more often.  · Take pain medicines with a small amount of solid food, such as crackers or toast, to avoid nausea.     Call your surgeon if  · You still have pain an hour after taking medicine. The medicine may not be strong enough.  · You feel too sleepy, dizzy, or groggy. The medicine may be too strong.  · You have side effects like nausea, vomiting, or skin changes, such as rash, itching, or hives.       If you have obstructive sleep apnea  You were given anesthesia medicine during surgery to keep you comfortable and free of pain. After surgery, you may have more apnea spells because of this medicine and other medicines you were given. The spells may last longer than usual.   At home:  · Keep using the continuous positive airway pressure (CPAP) device when you sleep. Unless your health care provider tells you not to, use it when you sleep, day or night. CPAP is a common device used to treat obstructive sleep apnea.  · Talk with your provider before taking any pain medicine, muscle relaxants, or sedatives. Your provider will tell you about the possible dangers of taking these medicines.  © 6471-0323 [employer]. [address] [place] [address]  [place] [address]. All rights reserved. This information is not intended as a substitute for professional medical care. Always follow your healthcare professional's instructions.

## 2020-11-06 NOTE — ANESTHESIA POSTPROCEDURE EVALUATION
Anesthesia Post Evaluation    Patient: Micheal Hunt    Procedure(s) Performed: Procedure(s) (LRB):  EGD (ESOPHAGOGASTRODUODENOSCOPY)(PEG removal and Arturo placement (N/A)  INSERTION OR REPLACEMENT, JEJUNOSTOMY TUBE, PERCUTANEOUS, ENDOSCOPIC (N/A)    Final Anesthesia Type: general    Patient location during evaluation: PACU  Patient participation: Yes- Able to Participate  Level of consciousness: awake and alert  Post-procedure vital signs: reviewed and stable  Pain management: adequate  Airway patency: patent    PONV status at discharge: No PONV  Anesthetic complications: no      Cardiovascular status: blood pressure returned to baseline  Respiratory status: unassisted  Hydration status: euvolemic  Follow-up not needed.          Vitals Value Taken Time   /75 11/05/20 1305   Temp 36.2 °C (97.2 °F) 11/05/20 1254   Pulse 58 11/05/20 1315   Resp 18 11/05/20 1315   SpO2 100 % 11/05/20 1315         Event Time   Out of Recovery 13:37:56         Pain/Sonny Score: Pain Rating Prior to Med Admin: 10 (11/5/2020 12:55 PM)  Sonny Score: 10 (11/5/2020  1:22 PM)  Modified Sonny Score: 18 (11/5/2020 11:55 AM)

## 2020-11-12 NOTE — DISCHARGE INSTRUCTIONS
Do not take amlodipine (norvasc) or lasix until seen at follow up MD appointments.    OK to take other medications including carvedilol and blood thinners    Resume normal HD.   No

## 2020-11-17 PROBLEM — G45.9 TIA (TRANSIENT ISCHEMIC ATTACK): Status: ACTIVE | Noted: 2020-01-01

## 2020-11-17 PROBLEM — R29.90 EPISODE OF TRANSIENT NEUROLOGIC SYMPTOMS: Status: ACTIVE | Noted: 2020-01-01

## 2020-11-17 NOTE — TELEPHONE ENCOUNTER
----- Message from Nelsy Earl sent at 11/17/2020  2:11 PM CST -----  Contact: Belle  Type: Needs Medical Advice  Who Called:  [facility]    Best Call Back Number: [phone]  Additional Information: patient fractured ankle, so physical therapy needs to be put on hold, call back if have any questions

## 2020-11-17 NOTE — TELEPHONE ENCOUNTER
Does he has a wheelchair? Please make sure he has support stockings and blood thinners, and stay active w/upper arms exercises.

## 2020-11-18 PROBLEM — I63.9 STROKE: Status: ACTIVE | Noted: 2020-01-01

## 2020-11-18 NOTE — HPI
Micheal Hunt is an 80-year-old male with past medical history significant for stroke, dementia, coronary artery disease, atrial fibrillation, end-stage renal disease on dialysis, type 2 diabetes, and hypertension who presented to the emergency department with an onset of facial droop which began this afternoon and has since resolved.  Patient's caregiver is at bedside and states that patient was in his usual state of health yesterday but has been vomiting today.  Caregiver reports that patient had a PEG tube placed last week  due to aspiration.  They have only used it for medication administration and some supplemental nutrition at night.  Family has still allowed patient oral intake.  Reports subjective fever this morning and increased cough.  No apparent SOB or chest pain.  She states that the PEG tube has not been used more consistently because the patient complains of pain in that area.  Reports previous stroke which occurred about a year ago with residual right-sided weakness.  Patient is wheelchair-bound. While in the ED, pt underwent a tele-stroke evaluation and tPA was not recommended as pt's symptoms had resolved and pt is on Eliquis.  He will be placed in observation for further monitoring and treatment.

## 2020-11-18 NOTE — ASSESSMENT & PLAN NOTE
Patient with Long standing persistent (>12 months) atrial fibrillation which is controlled currently with Beta Blocker. Patient is currently in atrial fibrillation. [id number] score 6. Anticoagulation indicated. Anticoagulation done with Eliquis which is currently on hold pending further recommendations from neurology. If CVA is ruled out resume Eliquis once okay with neurology team.

## 2020-11-18 NOTE — NURSING
"Spoke with care giver regarding button adapter to give peg feeding. Care giver reports leaving device at home. And stated "I will give her pleasure foods tonight until I can go home and get adapter"  "

## 2020-11-18 NOTE — ASSESSMENT & PLAN NOTE
Chronic, stable.  Latest blood pressure and vitals reviewed-   Temp:  [97 °F (36.1 °C)-98.8 °F (37.1 °C)]   Pulse:  [57-82]   Resp:  [16-19]   BP: (116-220)/()   SpO2:  [93 %-100 %] .   Home meds for hypertension were reviewed and noted below. Hospital anti-hypertensive changes were made as shown below.  Hypertension Medications             carvediloL (COREG) 12.5 MG tablet Take 6.5 tablets (81.25 mg total) by mouth 2 (two) times daily.      Hospital Medications             labetaloL injection 10 mg 10 mg, Intravenous, Every 6 hours PRN, Max dose 300 mg/24hrs<BR>Notify MD if 2 doses given within 45 minutes<BR>Do not give if heart rate less than 65 and call MD      Allow Permissive HTN <220/<120

## 2020-11-18 NOTE — PT/OT/SLP PROGRESS
Occupational Therapy      Patient Name:  Micheal Hunt   MRN:  [id number]    Attempted OT evaluation twice today. First attempt, patient getting an echocardiogram . Second attempt, patient receiving dialysis. Will follow-up tomorrow (11/19).    Kostas Alvarez, OT  11/18/2020

## 2020-11-18 NOTE — CONSULTS
[facility]  Vascular Neurology  Comprehensive Stroke Center  Tele-Consultation Note      Consults    Consulting Provider: MARCIA ROWELL  Current Providers  No providers found    Patient Location:  [facility] EMERGENCY DEPARTMENT Emergency Department  Spoke hospital nurse at bedside with patient assisting consultant.     Patient information was obtained from patient.         Assessment/Plan:     STROKE DOCUMENTATION     Acute Stroke Times:   Acute Stroke Times   Symptom Onset Date: 11/17/20  Symptom Onset Time: 1730  Stroke Team Called Time: 1901  Stroke Team Arrival Time: 1907  CT Interpretation Time: 1909    NIH Scale:  1a. Level of Consciousness: 0-->Alert, keenly responsive  1b. LOC Questions: 2-->Answers neither question correctly  1c. LOC Commands: 0-->Performs both tasks correctly  2. Best Gaze: 0-->Normal  3. Visual: 2-->Complete hemianopia  4. Facial Palsy: 0-->Normal symmetrical movements  5a. Motor Arm, Left: 0-->No drift, limb holds 90 (or 45) degrees for full 10 secs  5b. Motor Arm, Right: 3-->No effort against gravity, limb falls  6a. Motor Leg, Left: 0-->No drift, leg holds 30 degree position for full 5 secs  6b. Motor Leg, Right: 2-->Some effort against gravity, leg falls to bed by 5 secs, but has some effort against gravity  7. Limb Ataxia: 0-->Absent  8. Sensory: 0-->Normal, no sensory loss  9. Best Language: 0-->No aphasia, normal  10. Dysarthria: 0-->Normal  11. Extinction and Inattention (formerly Neglect): 0-->No abnormality  Total (NIH Stroke Scale): 9     Modified [place]    [place] Coma Scale:    ABCD2 Score:    [id number] Score:   HAS -BLED Score:   ICH Score:   Hunt & Sharp Classification:       Diagnoses:   Episode of transient neurologic symptoms  81 yo male with transiently slurred speech.  Sx may be exacerbation from previous stroke as he has been vomiting and may be dehydrated.    Risk assessment and modification.  No alteplase as sx have resolved  "and no signs of LVO.        Blood pressure (!) 220/120, pulse 82, temperature 98.8 °F (37.1 °C), temperature source Oral, resp. rate 18, height 6' 1" (1.854 m), weight 83 kg (182 lb 15.7 oz), SpO2 98 %.  Alteplase Eligible?: No  Alteplase Recommendation: Alteplase not recommended due to Symptoms resolved  and Full dose anticoagulation   Possible Interventional Revascularization Candidate? No; No large vessel occlusion    Disposition Recommendation: admit to inpatient    Subjective:     History of Present Illness:  81 yo male with slurred speech that has improved since noted. He presented with various episodes of emesis today. No triggers, has not been treated. Has hx of stroke w residual sx (R weak).      Woke up with symptoms?: no    Recent bleeding noted: no  Does the patient take any Blood Thinners? yes  Medications: Anticoagulants:  apixaban/Eliquis      Past Medical History: hypertension, diabetes, stroke and Afib    Past Surgical History: none    Family History: no relevant history    Social History: no smoking, no drinking, no drugs    Allergies: Ace Inhibitors  Angiotensin Ii  Arb-Angiotensin Receptor Antagonist  Eplerenone  Gabapentin  Sulfa (Sulfonamide Antibiotics)     Review of Systems   Constitutional: Negative for chills and fatigue.   HENT: Negative for nosebleeds and sore throat.    Eyes: Negative for photophobia and visual disturbance.   Respiratory: Negative for cough and shortness of breath.    Cardiovascular: Negative for chest pain and palpitations.   Gastrointestinal: Negative for abdominal pain, blood in stool, constipation, diarrhea, nausea and vomiting.   Endocrine: Negative for cold intolerance and heat intolerance.   Genitourinary: Negative for difficulty urinating and hematuria.   Musculoskeletal: Negative for arthralgias, back pain, joint swelling and neck pain.   Skin: Negative for color change and rash.   Neurological: Negative for light-headedness and headaches. " "  Psychiatric/Behavioral: Negative for confusion and hallucinations.     Objective:   Vitals: Blood pressure (!) 220/120, pulse 82, temperature 98.8 °F (37.1 °C), temperature source Oral, resp. rate 18, height 6' 1" (1.854 m), weight 83 kg (182 lb 15.7 oz), SpO2 98 %.     CT READ: Yes  Abnormal CT chronic changes.     Physical Exam  Vitals signs reviewed.   Constitutional:       Appearance: He is well-developed.   HENT:      Head: Normocephalic and atraumatic.      Right Ear: External ear normal.      Left Ear: External ear normal.   Eyes:      Conjunctiva/sclera: Conjunctivae normal.   Neck:      Musculoskeletal: Normal range of motion.      Trachea: No tracheal deviation.   Pulmonary:      Effort: Pulmonary effort is normal. No respiratory distress.   Abdominal:      General: There is no distension.   Musculoskeletal: Normal range of motion.   Skin:     General: Skin is dry.   Neurological:      Mental Status: He is alert.   Psychiatric:         Behavior: Behavior normal.         Thought Content: Thought content normal.         Judgment: Judgment normal.               Recommended the emergency room physician to have a brief discussion with the patient and/or family if available regarding the risks and benefits of treatment, and to briefly document the occurrence of that discussion in his clinical encounter note.     The attending portion of this evaluation, treatment, and documentation was performed per Brendan Price MD via audiovisual.    Billing code:  (moderate to severe stroke, large areas of edema, some mimics)    · This patient has a critical neurological condition/illness, with high morbidity and mortality.  · There is a high probability for acute neurological change leading to clinical and possibly life-threatening deterioration requiring highest level of physician preparedness for urgent intervention.  · Care was coordinated with other physicians involved in the patient's care.  · Radiologic " studies and laboratory data were reviewed and interpreted, and plan of care was re-assessed based on the results.  · Diagnosis, treatment options and prognosis may have been discussed with the patient and/or family members or caregiver.  · Further advanced medical management and further evaluation is warranted for his care.      In your opinion, this was a: Tier 2 N/A    Consult End Time: 7:17 PM     Brendan Price MD  [facility] Stroke [facility]  Vascular Neurology   [facility]

## 2020-11-18 NOTE — ASSESSMENT & PLAN NOTE
Patient is chronically on statin.will continue for now. Monitor clinically. Last LDL was   Lab Results   Component Value Date    LDLCALC 46.6 (L) 11/18/2020

## 2020-11-18 NOTE — PROGRESS NOTES
Pharmacist Renal Dose Adjustment Note    Micheal Hunt is a 80 y.o. male being treated with the medication Famotidine    Patient Data:    Vital Signs (Most Recent):  Temp: 97.7 °F (36.5 °C) (11/18/20 0317)  Pulse: 75 (11/18/20 0317)  Resp: 19 (11/18/20 0317)  BP: 116/70 (11/18/20 0317)  SpO2: (!) 93 % (11/18/20 0317)   Vital Signs (72h Range):  Temp:  [97.7 °F (36.5 °C)-98.8 °F (37.1 °C)]   Pulse:  [67-82]   Resp:  [18-19]   BP: (116-220)/()   SpO2:  [93 %-100 %]      Recent Labs   Lab 11/17/20 1858 11/18/20 0434   CREATININE 5.7* 6.3*     Serum creatinine: 6.3 mg/dL (H) 11/18/20 0434  Estimated creatinine clearance: 10.7 mL/min (A)    Medication: Famotidine 20 mg PO BID will be changed to Famotidine 20 mg PO QD    Rona Leon PharmD  Pharmacy Resident  [phone]

## 2020-11-18 NOTE — ED PROVIDER NOTES
Encounter Date: 11/17/2020    SCRIBE #1 NOTE: I, Nohemy Helms, am scribing for, and in the presence of, Reyna Prasad PA-C.       History     Chief Complaint   Patient presents with    Emesis     since this morning.       Time seen by provider: 6:39 PM on 11/17/2020    Micheal Hunt is a 80 y.o. male with PMHx of stroke, dementia, CAD, Afib, ESRD, DM, and HTN who presents to the ED with an onset of N/V. Patient was seemingly fine yesterday but has not felt well today. Caregiver said she has been gone for six days and just returned. After eating breakfast this morning, he began experiencing N/V. He reports 2 episodes of vomiting. Patient typically has a BM daily but has not passed a BM yesterday or today. Caregiver states he was complaining of abdominal pain earlier today but patient denies abdominal pain at this time. Caregiver states he recently had a PEG tube places secondary to decreased oral itnake.  He has been coughing a lot recently, finding it difficult to eat or drink. Patient denied any pain. The patient denies abdominal pain, CP, SOB, or any other symptoms at this time. He reports decreased appetite since previous stroke. PSHx of insertion of percutaneous endoscopic jejunostomy tube, appendectomy, EGD, colonoscopy, cardiac catheterization, and CABG.    The history is provided by the patient and a caregiver.     Review of patient's allergies indicates:   Allergen Reactions    Ace inhibitors Other (See Comments)     Cough    Angiotensin ii     Arb-angiotensin receptor antagonist Itching    Eplerenone Other (See Comments)     Marked bradycardia, 40, tiredness and weakness      Gabapentin Hallucinations    Sulfa (sulfonamide antibiotics) Itching and Swelling     Patient says this was 10 years ago and doesn't remember what happened     Past Medical History:   Diagnosis Date    NICK (acute kidney injury) 7/29/2016    Allergy     Anemia, mild 12/15/2014    Arthritis     Gout    Atrial  fibrillation 03/2019    Benign essential HTN 3/27/2012    BMI 29.0-29.9,adult 5/10/2018    BPH (benign prostatic hyperplasia)     BPH (benign prostatic hyperplasia)     CAD (coronary artery disease) 2006    Carotid artery occlusion     60-70% FROYLAN, LICA < 50%    Chronic kidney disease     due to ibuprofen    Colon polyp     CRF (chronic renal failure), stage 5     Diabetes mellitus     Diverticulosis     ESRD (end stage renal disease) on dialysis     Gastritis     GERD (gastroesophageal reflux disease)     Gout     History of colon polyps 5/3/2018    HTN (hypertension) 3/27/2012    Hyperlipidemia     Hyperlipidemia     LLL pneumonia 6/14/2018    LVH (left ventricular hypertrophy)     Mesenteric ischemia     Murmur, cardiac 3/27/2012    GRICELDA (obstructive sleep apnea)     DOES NOT USE A MACHINE    Sinus problem     Stroke 03/2019    acute left PCA    Syncope and collapse      Past Surgical History:   Procedure Laterality Date    APPENDECTOMY      CARDIAC CATHETERIZATION  2012    LIMA to LAD patent, SVG to RCA    COLONOSCOPY  2011    COLONOSCOPY N/A 5/3/2018    Procedure: COLONOSCOPY;  Surgeon: Messi Harris MD;  Location: [facility];  Service: Endoscopy;  Laterality: N/A;    COLONOSCOPY N/A 8/18/2020    Procedure: COLONOSCOPY;  Surgeon: Messi Harris MD;  Location: [facility];  Service: Endoscopy;  Laterality: N/A;    CORONARY ARTERY BYPASS GRAFT  4/2007    x 2 [place]    ESOPHAGOGASTRODUODENOSCOPY N/A 8/17/2020    Procedure: EGD (ESOPHAGOGASTRODUODENOSCOPY);  Surgeon: Eben Soto MD;  Location: [facility];  Service: Endoscopy;  Laterality: N/A;    ESOPHAGOGASTRODUODENOSCOPY N/A 11/5/2020    Procedure: EGD (ESOPHAGOGASTRODUODENOSCOPY)(PEG removal and Arturo placement;  Surgeon: Messi Harris MD;  Location: [facility];  Service: Endoscopy;  Laterality: N/A;    INSERTION OR REPLACEMENT OF PERCUTANEOUS ENDOSCOPIC JEJUNOSTOMY TUBE N/A 11/5/2020    Procedure: INSERTION  OR REPLACEMENT, JEJUNOSTOMY TUBE, PERCUTANEOUS, ENDOSCOPIC;  Surgeon: Messi Harris MD;  Location: [facility];  Service: Endoscopy;  Laterality: N/A;    JOINT REPLACEMENT      left knee total replacement  X 3    mid leftt finger      from a cactuss    MOLE REMOVAL      rotative cuff      no rotative cuffs on bilat shoulders has pins     SHOULDER SURGERY      shoulder surgery bilat  RIGHT X 4; LEFT X 3     Family History   Problem Relation Age of Onset    Heart disease Mother     Sudden death Father     Cancer Father         advanced lung ca- found after 2 story fall    Stroke Sister     Pneumonia Sister          from PNA    Heart disease Sister     Hypertension Brother     Kidney cancer Neg Hx     Prostate cancer Neg Hx     Urolithiasis Neg Hx     Allergic rhinitis Neg Hx     Allergies Neg Hx     Angioedema Neg Hx     Asthma Neg Hx     Atopy Neg Hx     Eczema Neg Hx     Immunodeficiency Neg Hx     Rhinitis Neg Hx     Urticaria Neg Hx      Social History     Tobacco Use    Smoking status: Never Smoker    Smokeless tobacco: Never Used   Substance Use Topics    Alcohol use: No     Frequency: Never     Drinks per session: Patient refused     Binge frequency: Patient refused    Drug use: No     Review of Systems   Constitutional: Positive for appetite change (chronic). Negative for activity change, chills and fever.   HENT: Negative for congestion, rhinorrhea and sore throat.    Eyes: Negative for redness and visual disturbance.   Respiratory: Positive for cough. Negative for chest tightness and shortness of breath.    Cardiovascular: Negative for chest pain.   Gastrointestinal: Positive for constipation, nausea and vomiting. Negative for abdominal pain and diarrhea.   Genitourinary: Negative for dysuria and frequency.   Musculoskeletal: Negative for back pain, neck pain and neck stiffness.   Skin: Negative for rash.   Neurological: Positive for facial asymmetry and speech  difficulty. Negative for dizziness, syncope, numbness and headaches.       Physical Exam     Initial Vitals [11/17/20 1825]   BP Pulse Resp Temp SpO2   (!) 220/120 82 18 98.8 °F (37.1 °C) 98 %      MAP       --         Physical Exam    Nursing note and vitals reviewed.  Constitutional: He appears well-developed and well-nourished. He is cooperative.  Non-toxic appearance. He does not have a sickly appearance.   HENT:   Head: Normocephalic and atraumatic.   Right Ear: External ear normal.   Left Ear: External ear normal.   Nose: Nose normal.   Mouth/Throat: Uvula is midline.   Eyes: Conjunctivae and lids are normal. Pupils are equal, round, and reactive to light.   Neck: Normal range of motion and full passive range of motion without pain. Neck supple.   Cardiovascular: Normal rate, regular rhythm and normal heart sounds. Exam reveals no gallop and no friction rub.    No murmur heard.  Pulmonary/Chest: Breath sounds normal. He has no wheezes. He has no rhonchi. He has no rales.   Abdominal: Soft. Normal appearance. There is no abdominal tenderness. There is no rigidity, no rebound and no guarding.   PEG tube in place   Neurological: He is alert and oriented to person, place, and time.   Chronic RUE paralysis. 3/5 strength to RLE which caregiver states is chronic. No facial droop. Answering questions.    Skin: Skin is warm, dry and intact. No rash noted.         ED Course   Procedures  Labs Reviewed   CBC W/ AUTO DIFFERENTIAL - Abnormal; Notable for the following components:       Result Value    RBC 3.13 (*)     Hemoglobin 10.0 (*)     Hematocrit 30.8 (*)     MCH 31.9 (*)     All other components within normal limits   COMPREHENSIVE METABOLIC PANEL - Abnormal; Notable for the following components:    CO2 21 (*)     Glucose 114 (*)     BUN 27 (*)     Creatinine 5.7 (*)     AST 9 (*)     eGFR if  10 (*)     eGFR if non  9 (*)     All other components within normal limits   LIPASE -  Abnormal; Notable for the following components:    Lipase 160 (*)     All other components within normal limits   ISTAT PROCEDURE - Abnormal; Notable for the following components:    POC PTWBT 15.7 (*)     POC PTINR 1.3 (*)     All other components within normal limits   POCT GLUCOSE, HAND-HELD DEVICE - Normal   PROTIME-INR   TSH   SARS-COV-2 RNA AMPLIFICATION, QUAL   URINALYSIS, REFLEX TO URINE CULTURE   POCT GLUCOSE          Imaging Results          X-Ray Abdomen Flat And Erect (Final result)  Result time 11/17/20 20:20:04    Final result by Bala Kiser MD (11/17/20 20:20:04)                 Impression:      Nonobstructive bowel gas pattern.  No definite free air.      Electronically signed by: Bala Kiser  Date:    11/17/2020  Time:    20:20             Narrative:    EXAMINATION:  XR ABDOMEN FLAT AND ERECT    CLINICAL HISTORY:  Vomiting, unspecified    TECHNIQUE:  Flat and erect AP views of the abdomen were performed.    COMPARISON:  Abdominal radiograph performed 06/14/2018    FINDINGS:  No definite dilated loops of small or large bowel appreciated.  No findings of free air or fluid levels on the upright view.  Postsurgical changes are suggested in the visualized upper abdomen.  Extensive degenerative changes are noted involving the spine and hip joints.  Vascular calcifications are noted.  Soft tissue structures are unremarkable.  No acute findings are suggested in the visualized lower lungs.  Status post median sternotomy.  EKG leads are noted.                               X-Ray Chest AP Portable (Final result)  Result time 11/17/20 19:28:58   Procedure changed from X-Ray Chest PA And Lateral     Final result by Bala Kiser MD (11/17/20 19:28:58)                 Impression:      No definite evidence of acute cardiopulmonary disease.      Electronically signed by: Bala Kiser  Date:    11/17/2020  Time:    19:28             Narrative:    EXAMINATION:  XR CHEST AP PORTABLE    CLINICAL  HISTORY:  emesis; Cough    TECHNIQUE:  Single frontal view of the chest was performed.    COMPARISON:  Chest radiograph performed 08/24/2020    FINDINGS:  Status post median sternotomy.  The cardiomediastinal contour appears unchanged.  Aortic atherosclerotic calcifications are again noted.  The lungs appear clear.  No pneumothorax or pleural effusion.  Postsurgical changes are again suggested in the upper abdomen.  Osseous and soft tissue structures without acute change.                               CT Head Without Contrast (Final result)  Result time 11/17/20 19:08:46    Final result by Salty Patiño Jr., MD (11/17/20 19:08:46)                 Impression:      Pre-existing moderate brain atrophy with extensive deep white matter ischemic changes.  Old ischemic CVA of the left temporoparietal and occipital lobes.  No acute CVA, hemorrhage or hematoma is demonstrated.      Electronically signed by: Salty Patiño MD  Date:    11/17/2020  Time:    19:08             Narrative:    EXAMINATION:  CT HEAD WITHOUT CONTRAST    CLINICAL HISTORY:  Neuro deficit, acute, stroke suspected;    TECHNIQUE:  Low dose axial images were obtained through the head.  Coronal and sagittal reformations were also performed. Contrast was not administered.    COMPARISON:  None.    FINDINGS:  No cranial fracture is identified.  Scalp edema or hematoma is not noted.    The basal cisterns are enlarged but clear.  There is no midline shift.  The patient has had a large CVA involving the left temporoparietal and occipital lobes with encephalomalacia and compensatory enlargement of the occipital horn of the left lateral ventricle.  Hemorrhage or mass effect here is not seen.  Elsewhere there is enlargement of the cerebral sulci throughout the cerebrum bilaterally and milder enlargement of the frontal horns of the lateral ventricles consistent with pre-existing brain atrophy.  Hypodensity in the cerebrum bilaterally is consistent with severe  deep white matter ischemic changes.    A new focal area of increased or decreased CT density consistent with tumor, edema, CVA or hemorrhage is seen.  No intracranial hemorrhage or hematoma is noted.                                 Medical Decision Making:   History:   I obtained history from: someone other than patient.  Old Medical Records: I decided to obtain old medical records.  Independently Interpreted Test(s):   I have ordered and independently interpreted EKG Reading(s) - see prior notes  Clinical Tests:   Lab Tests: Ordered and Reviewed  Radiological Study: Ordered and Reviewed  Medical Tests: Ordered and Reviewed  Other:   I have discussed this case with another health care provider.       APC / Resident Notes:   Emergent evaluation of an 80 year old male who presents with vomiting. Caregiver states he just hasn't felt well. He denied any abdominal pain. She reports no BM in two days which is unusual for him. He has a soft and non-tender abdomen with PEG tube in place. He does get routine HD on MWF and was dialyzed yesterday. Caregiver went to car and returned. When she returned at 6:53PM she then notifies us that they patient had facial droop and difficulty speaking about one hour PTA. Stroke code called over head. Patient evaluated by neurology. Labs are unremarkable. CT head reviewed. KUB obtained to rule out obstruction secondary to recent PEG tube placement and reported vomiting. Patient denied any nausea at this time. Patient will be observed and undergo MRI in hospital. Updated patient and caregiver. Case discussed with hospital medicine. Case discussed with Dr. Ng who is in agreement with the plan of care.       Scribe Attestation:   Scribe #1: I performed the above scribed service and the documentation accurately describes the services I performed. I attest to the accuracy of the note.    I, Reyna Prasad PA-C, personally performed the services described in this documentation. All medical  record entries made by the scribe were at my direction and in my presence.  I have reviewed the chart and agree that the record reflects my personal performance and is accurate and complete. Reyna Prasad PA-C.  8:08 PM 11/17/2020                    Clinical Impression:       ICD-10-CM ICD-9-CM   1. TIA (transient ischemic attack)  G45.9 435.9   2. Vomiting  R11.10 787.03   3. Cough  R05 786.2                          ED Disposition Condition    Observation                             Reyna Prasad PA-C  11/17/20 2025       Reyna Prasad PA-C  11/17/20 2025

## 2020-11-18 NOTE — PLAN OF CARE
"Pt is appears to be resting currently, pleasantly confused Oriented to person. Pt keeps stating "now remember, I don't have a right leg or arm because of previous stroke." Pt does have intact right arm and leg, but they are not functional r/t previous stroke. Placed stroke education packet on bedside table, reviewed it verbally with pt, he states he understood, but again, has moments of confusion. No c/o pain, NAD noted, no facial droop noted, slurred speech, expressive aphagia. Will continue to monitor.  "

## 2020-11-18 NOTE — ASSESSMENT & PLAN NOTE
Follow Neurology recommendations.  Neuro checks q4h  Permissive HTN <220/<120 - will hold home antihypertensives and restart when clinically appropriate  MRI of brain  MRA head and neck  ECHO  Trend CBC, CMP, Mg and Phos  Fasting lipid panel  HgA1C  Consult PT/OT/ST  Continue aspirin 81 mg daily.  Hold chronic Eliquis until OK with neurology to resume.  DVT prophylaxis with LOYD's, SCD's.    Aspiration precautions, fall precautions

## 2020-11-18 NOTE — PROGRESS NOTES
[facility] Hospital Medicine  Progress Note    Patient Name: Micheal Hunt  MRN: [id number]  Patient Class: OP- Observation   Admission Date: 11/17/2020  Length of Stay: 0 days  Attending Physician: Charissa Olivas MD  Primary Care Provider: Pk Lakhani MD        Subjective:     Principal Problem:Stroke        HPI:  Micheal Hunt is an 80-year-old male with past medical history significant for stroke, dementia, coronary artery disease, atrial fibrillation, end-stage renal disease on dialysis, type 2 diabetes, and hypertension who presented to the emergency department with an onset of facial droop which began this afternoon and has since resolved.  Patient's caregiver is at bedside and states that patient was in his usual state of health yesterday but has been vomiting today.  Caregiver reports that patient had a PEG tube placed last week  due to aspiration.  They have only used it for medication administration and some supplemental nutrition at night.  Family has still allowed patient oral intake.  Reports subjective fever this morning and increased cough.  No apparent SOB or chest pain.  She states that the PEG tube has not been used more consistently because the patient complains of pain in that area.  Reports previous stroke which occurred about a year ago with residual right-sided weakness.  Patient is wheelchair-bound. While in the ED, pt underwent a tele-stroke evaluation and tPA was not recommended as pt's symptoms had resolved and pt is on Eliquis.  He will be placed in observation for further monitoring and treatment.    Overview/Hospital Course:  11/18/2020 -  patient is with advanced dementia who is not aware why he is in the hospital.  No new focal neuro deficits reported other than chronic right-sided weakness from prior CVA history.  Reportedly patient was previously complaining of PEG site pain which is not present at this time.  Patient denies any nausea or vomiting.   Currently receiving hemodialysis treatment.  Patient denies chest pain or shortness of breath.  Stroke workup is underway.  Neurology has been consulted.      Interval History:   11/18/2020 -  patient is with advanced dementia who is not aware why he is in the hospital.  No new focal neuro deficits reported other than chronic right-sided weakness from prior CVA history.  Reportedly patient was previously complaining of PEG site pain which is not present at this time.  Patient denies any nausea or vomiting.  Currently receiving hemodialysis treatment.  Patient denies chest pain or shortness of breath.    Review of Systems   Constitutional: Positive for appetite change. Negative for chills and fever.   HENT: Negative for congestion and sore throat.    Eyes: Negative for photophobia and visual disturbance.   Respiratory: Positive for cough. Negative for shortness of breath.    Cardiovascular: Negative for chest pain and palpitations.   Gastrointestinal: Positive for abdominal pain, constipation, nausea and vomiting. Negative for diarrhea.   Endocrine: Negative for polydipsia and polyuria.   Genitourinary: Negative for dysuria and frequency.   Musculoskeletal: Positive for arthralgias and gait problem (chronic).   Skin: Negative for pallor and rash.   Neurological: Positive for facial asymmetry, speech difficulty and weakness (chronic right sided weakness).   Hematological: Bruises/bleeds easily.   Psychiatric/Behavioral: Negative for agitation. The patient is not nervous/anxious.    All other systems reviewed and are negative.    Objective:     Vital Signs (Most Recent):  Temp: 98.7 °F (37.1 °C) (11/18/20 1005)  Pulse: 80 (11/18/20 1300)  Resp: 16 (11/18/20 1005)  BP: (!) 177/104 (11/18/20 1300)  SpO2: 96 % (11/18/20 0756) Vital Signs (24h Range):  Temp:  [97 °F (36.1 °C)-98.8 °F (37.1 °C)] 98.7 °F (37.1 °C)  Pulse:  [57-82] 80  Resp:  [16-19] 16  SpO2:  [93 %-100 %] 96 %  BP: (116-220)/() 177/104     Weight:  86.2 kg (190 lb 0.6 oz)  Body mass index is 25.07 kg/m².  No intake or output data in the 24 hours ending 11/18/20 1324   Physical Exam  Vitals signs and nursing note reviewed.   Constitutional:       Appearance: He is well-developed.   HENT:      Head: Normocephalic and atraumatic.   Eyes:      Conjunctiva/sclera: Conjunctivae normal.      Pupils: Pupils are equal, round, and reactive to light.   Neck:      Musculoskeletal: Normal range of motion and neck supple.   Cardiovascular:      Rate and Rhythm: Normal rate and regular rhythm.      Pulses: Normal pulses.   Pulmonary:      Effort: Pulmonary effort is normal. No respiratory distress.      Breath sounds: Normal breath sounds.   Abdominal:      General: Bowel sounds are normal. There is no distension.      Palpations: Abdomen is soft.      Tenderness: There is no abdominal tenderness.      Comments: Peg tube in place   Genitourinary:     Comments: deferred  Musculoskeletal:      Right lower leg: No edema.      Left lower leg: No edema.      Comments: Right sided weakness which is chronic.  CG states at baseline.  Post-op shoe to right foot.  AV fistula left upper extremity, + thrill.   Skin:     General: Skin is warm and dry.      Capillary Refill: Capillary refill takes 2 to 3 seconds.   Neurological:      Mental Status: He is alert. Mental status is at baseline.      Motor: Weakness (right upper and lower extremities-baseline) present.      Comments: Pt can raise right arm off bed, poor hand .   Psychiatric:         Mood and Affect: Mood normal.         Behavior: Behavior normal.         Significant Labs:   CBC:   Recent Labs   Lab 11/17/20 1858 11/18/20  0434   WBC 7.34 7.37   HGB 10.0* 9.4*   HCT 30.8* 28.7*    139*     CMP:   Recent Labs   Lab 11/17/20 1858 11/18/20  0434    139   K 4.5 4.2    105   CO2 21* 23   * 96   BUN 27* 30*   CREATININE 5.7* 6.3*   CALCIUM 8.8 8.5*   PROT 7.2 6.6   ALBUMIN 3.9 3.5   BILITOT 0.5 0.4    ALKPHOS 77 69   AST 9* 10   ALT 12 10   ANIONGAP 13 11   EGFRNONAA 9* 8*     Lipid Panel:   Recent Labs   Lab 11/18/20  0434   CHOL 83*   HDL 25*   LDLCALC 46.6*   TRIG 57   CHOLHDL 30.1       Significant Imaging:  CT head without contrast:   Pre-existing moderate brain atrophy with extensive deep white matter ischemic changes.  Old ischemic CVA of the left temporoparietal and occipital lobes.  No acute CVA, hemorrhage or hematoma is demonstrated.    CXR: No definite evidence of acute cardiopulmonary disease.    KUB: Nonobstructive bowel gas pattern.  No definite free air.        Assessment/Plan:      * Stroke  Follow Neurology recommendations.  Neuro checks q4h  Permissive HTN <220/<120 - will hold home antihypertensives and restart when clinically appropriate  MRI of brain  MRA head and neck  ECHO  Trend CBC, CMP, Mg and Phos  Fasting lipid panel  HgA1C  Consult PT/OT/ST  Continue aspirin 81 mg daily.  Hold chronic Eliquis until OK with neurology to resume.  DVT prophylaxis with LOYD's, SCD's.    Aspiration precautions, fall precautions        PEG (percutaneous endoscopic gastrostomy) status  Noted.  Care per nursing.  Nutrition consult.      ESRD (end stage renal disease)  Creatine stable for now. BMP reviewed- noted Estimated Creatinine Clearance: 10.4 mL/min (A) (based on SCr of 6.3 mg/dL (H)). according to latest data. Monitor UOP and serial BMP and adjust therapy as needed. Renally dose meds.  Chronic dialysis MWF, continue hemodialysis therapy as per Nephrology team.            Persistent atrial fibrillation  Patient with Long standing persistent (>12 months) atrial fibrillation which is controlled currently with Beta Blocker. Patient is currently in atrial fibrillation. [id number] score 6. Anticoagulation indicated. Anticoagulation done with Eliquis which is currently on hold pending further recommendations from neurology. If CVA is ruled out resume Eliquis once okay with neurology team.         Anemia of  chronic disease  Chronic problem.  Stable.  Monitor H/H.    Transfuse for hemodynamic instability and/or H/H <7/21        GERD (gastroesophageal reflux disease)  Chronic; utilize pepcid bid acutely.      CAD (coronary artery disease), 2V CABG 2007  Patient with known CAD s/p CABG, which is controlled Will continue ASA and Statin and monitor for S/Sx of angina/ACS. Continue to monitor on telemetry.         Hyperlipidemia   Patient is chronically on statin.will continue for now. Monitor clinically. Last LDL was   Lab Results   Component Value Date    LDLCALC 46.6 (L) 11/18/2020            Essential hypertension  Chronic, stable.  Latest blood pressure and vitals reviewed-   Temp:  [97 °F (36.1 °C)-98.8 °F (37.1 °C)]   Pulse:  [57-82]   Resp:  [16-19]   BP: (116-220)/()   SpO2:  [93 %-100 %] .   Home meds for hypertension were reviewed and noted below. Hospital anti-hypertensive changes were made as shown below.  Hypertension Medications             carvediloL (COREG) 12.5 MG tablet Take 6.5 tablets (81.25 mg total) by mouth 2 (two) times daily.      Hospital Medications             labetaloL injection 10 mg 10 mg, Intravenous, Every 6 hours PRN, Max dose 300 mg/24hrs<BR>Notify MD if 2 doses given within 45 minutes<BR>Do not give if heart rate less than 65 and call MD      Allow Permissive HTN <220/<120      UTI (urinary tract infection)  Continue IV rocephin  Follow culture          VTE Risk Mitigation (From admission, onward)         Ordered     IP VTE HIGH RISK PATIENT  Once      11/17/20 2247     Place sequential compression device  Until discontinued      11/17/20 2247     Reason for No Pharmacological VTE Prophylaxis  Once     Question:  Reasons:  Answer:  Already adequately anticoagulated on oral Anticoagulants    11/17/20 2247                Discharge Planning   MARKELL: 11/19/2020     Code Status: Full Code   Is the patient medically ready for discharge?:     Reason for patient still in hospital (select all  that apply): Patient trending condition                     Charissa Olivas MD  Department of Hospital Medicine   [facility]

## 2020-11-18 NOTE — ASSESSMENT & PLAN NOTE
Chronic, stable.  Latest blood pressure and vitals reviewed-   Temp:  [98 °F (36.7 °C)-98.8 °F (37.1 °C)]   Pulse:  [67-82]   Resp:  [18]   BP: (173-220)/()   SpO2:  [97 %-100 %] .   Home meds for hypertension were reviewed and noted below. Hospital anti-hypertensive changes were made as shown below.  Hypertension Medications             carvediloL (COREG) 12.5 MG tablet Take 6.5 tablets (81.25 mg total) by mouth 2 (two) times daily.      Hospital Medications             labetaloL injection 10 mg 10 mg, Intravenous, Every 6 hours PRN, Max dose 300 mg/24hrs<BR>Notify MD if 2 doses given within 45 minutes<BR>Do not give if heart rate less than 65 and call MD      Allow Permissive HTN <220/<120

## 2020-11-18 NOTE — ASSESSMENT & PLAN NOTE
Creatine stable for now. BMP reviewed- noted Estimated Creatinine Clearance: 10.4 mL/min (A) (based on SCr of 6.3 mg/dL (H)). according to latest data. Monitor UOP and serial BMP and adjust therapy as needed. Renally dose meds.  Chronic dialysis MWF, continue hemodialysis therapy as per Nephrology team.

## 2020-11-18 NOTE — PT/OT/SLP EVAL
Speech Language Pathology Evaluation  Bedside Swallow    Patient Name:  Micheal Hunt   MRN:  [id number]  Admitting Diagnosis: Stroke    Recommendations:                 General Recommendations:  Education  Diet recommendations: PEG for primary means of nutrition/hydration.  Pleasure Feeding of pureed textures  Aspiration Precautions: Eliminate distractions, No straws and Puree for pleasure   General Precautions: Standard, aspiration, fall  Communication strategies:  none    History:     Past Medical History:   Diagnosis Date    NICK (acute kidney injury) 7/29/2016    Allergy     Anemia, mild 12/15/2014    Arthritis     Gout    Atrial fibrillation 03/2019    Benign essential HTN 3/27/2012    BMI 29.0-29.9,adult 5/10/2018    BPH (benign prostatic hyperplasia)     BPH (benign prostatic hyperplasia)     CAD (coronary artery disease) 2006    Carotid artery occlusion     60-70% FROYLAN, LICA < 50%    Chronic kidney disease     due to ibuprofen    Colon polyp     CRF (chronic renal failure), stage 5     Diabetes mellitus     Diverticulosis     ESRD (end stage renal disease) on dialysis     Gastritis     GERD (gastroesophageal reflux disease)     Gout     History of colon polyps 5/3/2018    HTN (hypertension) 3/27/2012    Hyperlipidemia     Hyperlipidemia     LLL pneumonia 6/14/2018    LVH (left ventricular hypertrophy)     Mesenteric ischemia     Murmur, cardiac 3/27/2012    GRICELDA (obstructive sleep apnea)     DOES NOT USE A MACHINE    Sinus problem     Stroke 03/2019    acute left PCA    Syncope and collapse        Past Surgical History:   Procedure Laterality Date    APPENDECTOMY      CARDIAC CATHETERIZATION  2012    LIMA to LAD patent, SVG to RCA    COLONOSCOPY  2011    COLONOSCOPY N/A 5/3/2018    Procedure: COLONOSCOPY;  Surgeon: Messi Harris MD;  Location: [facility];  Service: Endoscopy;  Laterality: N/A;    COLONOSCOPY N/A 8/18/2020    Procedure: COLONOSCOPY;  Surgeon: Messi  AMINATA Harris MD;  Location: [facility] ENDO;  Service: Endoscopy;  Laterality: N/A;    CORONARY ARTERY BYPASS GRAFT  4/2007    x 2 [place]    ESOPHAGOGASTRODUODENOSCOPY N/A 8/17/2020    Procedure: EGD (ESOPHAGOGASTRODUODENOSCOPY);  Surgeon: Eben Soto MD;  Location: [facility] ENDO;  Service: Endoscopy;  Laterality: N/A;    ESOPHAGOGASTRODUODENOSCOPY N/A 11/5/2020    Procedure: EGD (ESOPHAGOGASTRODUODENOSCOPY)(PEG removal and Arturo placement;  Surgeon: Messi Harris MD;  Location: [facility] ENDO;  Service: Endoscopy;  Laterality: N/A;    INSERTION OR REPLACEMENT OF PERCUTANEOUS ENDOSCOPIC JEJUNOSTOMY TUBE N/A 11/5/2020    Procedure: INSERTION OR REPLACEMENT, JEJUNOSTOMY TUBE, PERCUTANEOUS, ENDOSCOPIC;  Surgeon: Messi Harris MD;  Location: [facility] ENDO;  Service: Endoscopy;  Laterality: N/A;    JOINT REPLACEMENT      left knee total replacement  X 3    mid leftt finger      from a cactuss    MOLE REMOVAL  2016    rotative cuff      no rotative cuffs on bilat shoulders has pins     SHOULDER SURGERY      shoulder surgery bilat  RIGHT X 4; LEFT X 3       Social History: Patient lives alone. Caregiver 12 hours/day.     Prior Intubation HX:  None this admit    Modified Barium Swallow 2/6/2020: The patient presents with mild oral dysphagia, mild to severe pharyngeal dysphagia, and esophageal dysphagia.       Recommendations: Regular consistencies (IDDSI 7) and thin-honey thick liquids (IDDSI 3). Thicken all liquids to thin honey thickness including liquid on food (soups, milk on cereal, etc) and liquid medications. Avoid dry, crumbly foods. Fresh fruits may be consumed; however, avoid melons. NO STRAWS.       Modified Barium Swallow Study 3/11/2020: Pt presents with severe oropharyngeal dysphagia marked by severely compromised airway protection.  Laryngeal penetration and silent aspiration observed w/ all trials before, during, and after swallow.  Pt currently remains inappropriate for PO intake and will require  "alternative means of hydration/nutrition.  Results and recommendations were discussed w/ RN, MD, and pt's daughter; all verbalized understanding of all information discussed.      Imaging:  X-Ray Abdomen Flat And Erect   Final Result      Nonobstructive bowel gas pattern.  No definite free air.         Electronically signed by: Bala Kiser   Date:    11/17/2020   Time:    20:20      X-Ray Chest AP Portable   Final Result      No definite evidence of acute cardiopulmonary disease.         Electronically signed by: Bala Ksier   Date:    11/17/2020   Time:    19:28      CT Head Without Contrast   Final Result      Pre-existing moderate brain atrophy with extensive deep white matter ischemic changes.  Old ischemic CVA of the left temporoparietal and occipital lobes.  No acute CVA, hemorrhage or hematoma is demonstrated.         Electronically signed by: Salty Patiño MD   Date:    11/17/2020   Time:    19:08      MRI Brain Without Contrast    (Results Pending)   MRA Brain without contrast    (Results Pending)   MRA Neck without contrast    (Results Pending)        Prior diet: Per caregiver at bedside, patient's PEG changed to YAMIL-KEY button 2 weeks ago. They have not been using PEG since it was changed and pt has been consuming oral diet of soft solids and liquids. Caregiver endorses frequent coughing with PO.     Occupation/hobbies/homemaking: Pt lives alone with caregiver 12 hours/day. Caregiver puts patient to bed and he is alone during the night. Daughter lives nearby and has a camera in his room that she uses to check on patient. Pt also has an emergency alert system. He is nonambulatory; w/c bound.     Subjective   "[facility]."  States he is at the hospital due to "sinus problems."    Objective:   Pt seen for clinical swallow evaluation. Chart reviewed. Pt is AAO to self only. Pleasantly confused, cooperative and following simple commands. Caregiver at bedside to provide history. Pt with h/o " chronic dysphagia with aspiration. Prior MBSS(s) results reviewed (2/2020 & 3/2020).     Attempted to reach daughter, Tonya, via telephone to discuss risks/benefits of oral intake. No answer and mailbox full.     Oral Musculature Evaluation  · Oral Musculature: right weakness  · Dentition: present and adequate  · Secretion Management: right corner drooling(mild)  · Mucosal Quality: coated tongue  · Mandibular Strength and Mobility: WFL  · Oral Labial Strength and Mobility: WFL(mildly impaired retraction on R)  · Lingual Strength and Mobility: WFL  · Volitional Cough: adequate  · Volitional Swallow: able to palpate laryngeal rise  · Voice Prior to PO Intake: clear    Bedside Swallow Eval:   Consistencies Assessed:  · Thin liquids --via tsp  · Nectar thick liquids --via tsp  · Puree --via tspx3     Oral Phase:   · WFL    Pharyngeal Phase:   · coughing/choking with thin and nectar thick liquids  · No overt s/s airway threat noted with pureed textures    Compensatory Strategies  · None    Treatment: Pt/family educated re: results/recs of evaluation, SLP role and POC. Receptive to information provided.     Assessment:     Micheal Hunt is a 80 y.o. male with an SLP diagnosis of chronic Dysphagia.  He presents with s/s airway threat with thin and nectar thick liquids. Pt with known h/o aspiration. REC PEG feedings for primary means of nutrition/hydration. Attempted to reach daughter via telephone, but no answer and mailbox full. Will attempt to reach her by phone again tomorrow. In the recent past, she desired continued oral intake to maximize patient's quality of life with the understanding of aspiration risk. If pt/daughter wish to continue oral intake with the understanding of HIGH risk for aspiration, I would recommend pleasure feeds of pureed textures.Meal trays should not be send to patients room. Pleasure feeds only when pt requests.         Plan:     · Patient to be seen:      · Plan of Care expires:      · Plan of Care reviewed with:  patient, caregiver   · SLP Follow-Up:  No       Discharge recommendations:      Barriers to Discharge:  None    Time Tracking:     SLP Treatment Date:   11/18/20  Speech Start Time:  0955  Speech Stop Time:  1009     Speech Total Time (min):  14 min    Billable Minutes: Eval Swallow and Oral Function 14 and Total Time 14    Khushi Ulloa CCC-SLP  11/18/2020

## 2020-11-18 NOTE — CONSULTS
Nephrology Consult Note        Patient Name: Micheal Hunt  MRN: [id number]    Patient Class: OP- Observation   Admission Date: 11/17/2020  Length of Stay: 0 days  Date of Service: 11/18/2020    Attending Physician: Charissa Olivas MD  Primary Care Provider: Pk Lakhani MD    Reason for Consult: esrd/anemia/htn/shpt/stroke    SUBJECTIVE:     HPI: 80M with h/o stroke, dementia, CAD, AF, ESRD on HD MWF, DM2, HTN presented with facial droop, vomiting, cough, subjective fevers. Patient had a PEG tube placed last week due to aspiration. They have only used it for medication administration and some supplemental nutrition at night. Family has still allowed patient oral intake. PEG tube has not been used more consistently because the patient complains of pain in that area. Previous stroke occurred about a year ago with residual right-sided weakness. Patient is wheelchair-bound. While in the ED, pt underwent a tele-stroke evaluation and tPA was not recommended as pt's symptoms had resolved and pt is on Eliquis.    11/18 VSS, seen and examined on HD, tolerating well.    Past Medical History:   Diagnosis Date    NICK (acute kidney injury) 7/29/2016    Allergy     Anemia, mild 12/15/2014    Arthritis     Gout    Atrial fibrillation 03/2019    Benign essential HTN 3/27/2012    BMI 29.0-29.9,adult 5/10/2018    BPH (benign prostatic hyperplasia)     BPH (benign prostatic hyperplasia)     CAD (coronary artery disease) 2006    Carotid artery occlusion     60-70% FROYLAN, LICA < 50%    Chronic kidney disease     due to ibuprofen    Colon polyp     CRF (chronic renal failure), stage 5     Diabetes mellitus     Diverticulosis     ESRD (end stage renal disease) on dialysis     Gastritis     GERD (gastroesophageal reflux disease)     Gout     History of colon polyps 5/3/2018    HTN (hypertension) 3/27/2012    Hyperlipidemia     Hyperlipidemia     LLL pneumonia 6/14/2018    LVH (left ventricular hypertrophy)      Mesenteric ischemia     Murmur, cardiac 3/27/2012    GRICELDA (obstructive sleep apnea)     DOES NOT USE A MACHINE    Sinus problem     Stroke 2019    acute left PCA    Syncope and collapse      Past Surgical History:   Procedure Laterality Date    APPENDECTOMY      CARDIAC CATHETERIZATION      LIMA to LAD patent, SVG to RCA    COLONOSCOPY      COLONOSCOPY N/A 5/3/2018    Procedure: COLONOSCOPY;  Surgeon: Messi Harris MD;  Location: [facility] ENDO;  Service: Endoscopy;  Laterality: N/A;    COLONOSCOPY N/A 2020    Procedure: COLONOSCOPY;  Surgeon: Messi Harris MD;  Location: [facility] ENDO;  Service: Endoscopy;  Laterality: N/A;    CORONARY ARTERY BYPASS GRAFT  4/2007    x 2 [place]    ESOPHAGOGASTRODUODENOSCOPY N/A 2020    Procedure: EGD (ESOPHAGOGASTRODUODENOSCOPY);  Surgeon: Eben Soto MD;  Location: [facility] ENDO;  Service: Endoscopy;  Laterality: N/A;    ESOPHAGOGASTRODUODENOSCOPY N/A 2020    Procedure: EGD (ESOPHAGOGASTRODUODENOSCOPY)(PEG removal and Arturo placement;  Surgeon: Messi Harris MD;  Location: [facility] ENDO;  Service: Endoscopy;  Laterality: N/A;    INSERTION OR REPLACEMENT OF PERCUTANEOUS ENDOSCOPIC JEJUNOSTOMY TUBE N/A 2020    Procedure: INSERTION OR REPLACEMENT, JEJUNOSTOMY TUBE, PERCUTANEOUS, ENDOSCOPIC;  Surgeon: Messi Harris MD;  Location: [facility] ENDO;  Service: Endoscopy;  Laterality: N/A;    JOINT REPLACEMENT      left knee total replacement  X 3    mid leftt finger      from a cactuss    MOLE REMOVAL      rotative cuff      no rotative cuffs on bilat shoulders has pins     SHOULDER SURGERY      shoulder surgery bilat  RIGHT X 4; LEFT X 3     Family History   Problem Relation Age of Onset    Heart disease Mother     Sudden death Father     Cancer Father         advanced lung ca- found after 2 story fall    Stroke Sister     Pneumonia Sister          from PNA    Heart disease Sister     Hypertension Brother     Kidney  cancer Neg Hx     Prostate cancer Neg Hx     Urolithiasis Neg Hx     Allergic rhinitis Neg Hx     Allergies Neg Hx     Angioedema Neg Hx     Asthma Neg Hx     Atopy Neg Hx     Eczema Neg Hx     Immunodeficiency Neg Hx     Rhinitis Neg Hx     Urticaria Neg Hx      Social History     Tobacco Use    Smoking status: Never Smoker    Smokeless tobacco: Never Used   Substance Use Topics    Alcohol use: No     Frequency: Never     Drinks per session: Patient refused     Binge frequency: Patient refused    Drug use: No       Review of patient's allergies indicates:   Allergen Reactions    Ace inhibitors Other (See Comments)     Cough    Angiotensin ii     Arb-angiotensin receptor antagonist Itching    Eplerenone Other (See Comments)     Marked bradycardia, 40, tiredness and weakness      Gabapentin Hallucinations    Sulfa (sulfonamide antibiotics) Itching and Swelling     Patient says this was 10 years ago and doesn't remember what happened       Outpatient meds:  No current facility-administered medications on file prior to encounter.      Current Outpatient Medications on File Prior to Encounter   Medication Sig Dispense Refill    allopurinoL (ZYLOPRIM) 100 MG tablet 100 mg by FEEDING TUBE route once daily.      apixaban (ELIQUIS) 2.5 mg Tab 2.5 mg by FEEDING TUBE route 2 (two) times daily.      aspirin (ECOTRIN) 81 MG EC tablet 81 mg by FEEDING TUBE route once daily.      atorvastatin (LIPITOR) 40 MG tablet 1 tablet (40 mg total) by Per G Tube route once daily. 90 tablet 4    carvediloL (COREG) 12.5 MG tablet Take 6.5 tablets (81.25 mg total) by mouth 2 (two) times daily. (Patient taking differently: 12.5 mg by Per G Tube route once daily. ) 90 tablet 4    levETIRAcetam (KEPPRA) 100 mg/mL Soln 7.5 ML BY PEG DAILY 120 mL 5    nut.tx.imp.renal fxn,lac-reduc (NEPRO CARB STEADY ORAL) 240 mLs by FEEDING TUBE route 2 (two) times daily. 6 hours apart AM and afternoon      nut.tx.imp.renal  fxn,lac-reduc (NEPRO CARB STEADY ORAL) 1,000 mLs by FEEDING TUBE route every evening.       senna-docusate 8.6-50 mg (PERICOLACE) 8.6-50 mg per tablet 1 tablet by Per G Tube route once daily. 30 tablet 11    sertraline (ZOLOFT) 25 MG tablet Take 1 tablet (25 mg total) by mouth every evening. 30 tablet 3    vit b cmplx 3-fa-vit c-biotin 1- mg-mg-mcg (NEPHRO-EMMETT RX) 1- mg-mg-mcg Tab Take 1 tablet by mouth once daily. (Patient taking differently: 1 tablet by Per G Tube route once daily. ) 90 tablet 3    [DISCONTINUED] levETIRAcetam (KEPPRA) 100 mg/mL Soln 7.5 ml by PEG daily (Patient taking differently: 750 mg by Per G Tube route once daily. 7.5 ml by PEG daily) 120 mL 5       Scheduled meds:   allopurinoL  100 mg Per G Tube Daily    aspirin  81 mg Per G Tube Daily    atorvastatin  40 mg Per G Tube Daily    cefTRIAXone (ROCEPHIN) IVPB  1 g Intravenous Q24H    famotidine  20 mg Per G Tube Daily    levetiracetam oral soln  7.5 mL Per G Tube Daily    senna-docusate 8.6-50 mg  1 tablet Per G Tube Daily       Infusions:   sodium chloride 0.9%         PRN meds:  acetaminophen, labetaloL, ondansetron, sodium chloride 0.9%, sodium chloride 0.9%    Review of Systems:  Review of Systems   Constitutional: Positive for malaise/fatigue. Negative for chills, fever and weight loss.   HENT: Negative for hearing loss and nosebleeds.    Eyes: Negative for blurred vision, double vision and photophobia.   Respiratory: Positive for cough. Negative for wheezing.    Cardiovascular: Negative for chest pain, palpitations and leg swelling.   Gastrointestinal: Positive for abdominal pain, nausea and vomiting. Negative for constipation, diarrhea and heartburn.   Genitourinary: Negative for dysuria, frequency and urgency.   Musculoskeletal: Negative for falls, joint pain and myalgias.   Skin: Negative for itching and rash.   Neurological: Positive for focal weakness. Negative for dizziness, speech change, loss of  consciousness and headaches.   Endo/Heme/Allergies: Does not bruise/bleed easily.   Psychiatric/Behavioral: Negative for depression and substance abuse. The patient is not nervous/anxious.        OBJECTIVE:     Vital Signs and IO (Last 24H):  Temp:  [97 °F (36.1 °C)-98.8 °F (37.1 °C)]   Pulse:  [67-82]   Resp:  [18-19]   BP: (116-220)/()   SpO2:  [93 %-100 %]   No intake/output data recorded.    Wt Readings from Last 5 Encounters:   11/17/20 86.3 kg (190 lb 4.1 oz)   11/05/20 83.9 kg (185 lb)   10/06/20 82.9 kg (182 lb 12.2 oz)   09/30/20 85 kg (187 lb 6.3 oz)   09/29/20 85.3 kg (188 lb)         Physical Exam:  Physical Exam  Constitutional:       Appearance: He is well-developed. He is not diaphoretic.   HENT:      Head: Normocephalic and atraumatic.   Eyes:      General: No scleral icterus.     Pupils: Pupils are equal, round, and reactive to light.   Neck:      Musculoskeletal: Neck supple.   Cardiovascular:      Rate and Rhythm: Normal rate and regular rhythm.   Pulmonary:      Effort: Pulmonary effort is normal. No respiratory distress.      Breath sounds: No stridor.   Abdominal:      General: There is no distension.      Palpations: Abdomen is soft.   Musculoskeletal: Normal range of motion.         General: No deformity.   Skin:     General: Skin is warm and dry.      Findings: No erythema or rash.   Neurological:      Mental Status: He is alert and oriented to person, place, and time.      Cranial Nerves: No cranial nerve deficit.      Motor: Weakness and abnormal muscle tone present.   Psychiatric:         Behavior: Behavior normal.         Body mass index is 24.43 kg/m².    Laboratory:  Recent Labs   Lab 11/17/20 1858 11/18/20  0434    139   K 4.5 4.2    105   CO2 21* 23   BUN 27* 30*   CREATININE 5.7* 6.3*   ESTGFRAFRICA 10* 9*   EGFRNONAA 9* 8*   * 96       Recent Labs   Lab 11/17/20 1858 11/18/20  0434   CALCIUM 8.8 8.5*   ALBUMIN 3.9 3.5   PHOS  --  4.2   MG  --  1.9        Recent Labs   Lab 07/26/18  0730 08/14/18  1020 11/12/18  0843   PTH, Intact 14.0 19.2 388.8 H       Recent Labs   Lab 11/17/20  1854   POCTGLUCOSE 106       Recent Labs   Lab 03/11/19  1109 03/21/19  0456   Hemoglobin A1C 7.5 H 8.3 H       Recent Labs   Lab 11/17/20  1858 11/18/20  0434   WBC 7.34 7.37   HGB 10.0* 9.4*   HCT 30.8* 28.7*    139*   MCV 98 99*   MCHC 32.5 32.8   MONO 5.6  0.4 7.1  0.5       Recent Labs   Lab 11/17/20  1858 11/18/20  0434   BILITOT 0.5 0.4   PROT 7.2 6.6   ALBUMIN 3.9 3.5   ALKPHOS 77 69   ALT 12 10   AST 9* 10       Recent Labs   Lab 03/11/19  1823 04/02/19  0739 09/22/20  1031 11/17/20 2150   Color, UA Yellow Yellow Yellow Yellow   Appearance, UA Clear Clear Clear Hazy A   pH, UA 6.0 >8.0 A 6.0 6.0   Specific [place], UA 1.010 1.010 1.020 1.025   Protein, UA 2+ A 2+ A 2+ A 3+ A   Glucose, UA 3+ A Negative Negative Negative   Ketones, UA Negative Negative Negative Negative   Urobilinogen, UA Negative  --  Negative Negative   Bilirubin (UA) Negative Negative Negative Negative   Occult Blood UA 1+ A 1+ A 3+ A 3+ A   Nitrite, UA Negative Negative Negative Negative   RBC, UA 4 3 75 H 30 H   WBC, UA 0 2 0 25 H   Bacteria None Occasional Many A Rare   Hyaline Casts, UA 0 0 1 0       Recent Labs   Lab 03/24/19  1419 11/17/20  1911   POC PH 7.397  --    POC PCO2 31.7 L  --    POC HCO3 19.5 L  --    POC PO2 91  --    POC SATURATED O2 97  --    POC BE -5  --    Sample ARTERIAL unknown       Microbiology Results (last 7 days)     Procedure Component Value Units Date/Time    Urine culture [[id number]] Collected: 11/17/20 2150    Order Status: No result Specimen: Urine Updated: 11/17/20 2224          ASSESSMENT/PLAN:     Active Hospital Problems    Diagnosis  POA    *Stroke [I63.9]  Yes    PEG (percutaneous endoscopic gastrostomy) status [Z93.1]  Not Applicable    ESRD (end stage renal disease) [N18.6]  Yes    Persistent atrial fibrillation [I48.19]  Yes    Anemia of chronic  disease [D63.8]  Yes    GERD (gastroesophageal reflux disease) [K21.9]  Yes    CAD (coronary artery disease), 2V CABG 2007 [I25.10]  Yes    Essential hypertension [I10]  Yes    Hyperlipidemia [E78.5]  Yes    UTI (urinary tract infection) [N39.0]  Yes     Dx updated per 2019 IMO Load        Resolved Hospital Problems   No resolved problems to display.       ESRD on HD MWF via AVF  Continue current dialysis prescription.  Next HD per schedule.  Renal diet - low K, low phos.  No IVs or BP checks on access and/or non-dominant arm.    Anemia of CKD  Hgb and HCT are acceptable. Monitor.  Will provide NIC and/or IV iron PRN.    MBD / Secondary HPT  Ca, phos, PTH and vitamin D levels are acceptable.   Phos binders, vitamin D analogues and calcimimetics as needed.    HTN  BP seem controlled.   Tolerate asymptomatic HTN up to -160.  Continue home meds.  Low sodium diet.    Stroke vs TIA, nausea, vomiting  Plan per primary team and neurology.    Thank you for allowing us to participate in the care of your patient!   We will follow the patient and provide recommendations as needed.    Patient care time was spent personally by me on the following activities:   · Obtaining a history.  · Examination of patient.  · Providing medical care at the patients bedside.  · Developing a treatment plan with patient or surrogate and bedside caregivers.  · Ordering and reviewing laboratory studies, radiographic studies, pulse oximetry.  · Ordering and performing treatments and interventions.  · Evaluation of patient's response to treatment.  · Discussions with consultants while on the unit and immediately available to the patient.  · Re-evaluation of the patient's condition.  · Documentation in the medical record.     Darrel Cary MD    [facility]  [address] [place] [address]  [place] [id number]    [phone] - tel  [phone] - fax    11/18/2020

## 2020-11-18 NOTE — PLAN OF CARE
"Cm entered pt's room to complete the assessment.Pt was receiving dialysis. Cm tried asking pt's questions, he answered a little and then he began to cry. Cm asked if he was okay, pt stated, "yes, I will be okay."  Cm asked pt if he prefers  me to complete the assessment with his daughter, pt said, "yes."  Cm called daughterTonya.  Apparently pt lives alone,he is wheel chair bound and does not have much assistance. Daughter assist him when she can. Pt has a Peg tube feedings with some pleasure foods and liquids.  Pt had a stroke recently(June). Daughter feels pt needs help with everything.  Pt has right sided weakness.  PCP is Dr. Lakhani.  Insurance verified as Medicare/[facility].  Pt is on dialysis MWF @ 12:00noon- [facility] in [place].  Pharmacy of choice is [facility] in [place].  Disposition:  Cm asked daughter for a verbal consent to sign the pt's choice disclosure form to resume hh with [facility].  Also, cm informed daughter that PT/OT will evaluate pt for possible Rehab vs HH.  Also, cm asked daughter [facility].  Daughter said, Pt was at [facility] for 100 days on June 19th.  She does not believe he has any more days to go to SNF. Cm informed daughter that pt may qualify for Rehab. PT/OT will evaluate pt and I will follow-up with her.  Daughter agreed. Cm will continue to follow-up with pt's discharge needs.           11/18/20 1200   Discharge Assessment   Assessment Type Discharge Planning Assessment   Confirmed/corrected address and phone number on facesheet? Yes   Assessment information obtained from? Caregiver   Expected Length of Stay (days) 2   Communicated expected length of stay with patient/caregiver yes   Prior to hospitilization cognitive status: Alert/Oriented   Prior to hospitalization functional status: Needs Assistance;Partially Dependent   Current cognitive status: Alert/Oriented   Current Functional Status: Needs Assistance;Partially Dependent   Facility Arrived From: home   Lives " With alone   Able to Return to Prior Arrangements other (see comments)  (TBD)   Is patient able to care for self after discharge? Unable to determine at this time (comments)   Who are your caregiver(s) and their phone number(s)? swapna Castaneda-- [phone]   Patient's perception of discharge disposition home health;home or selfcare   Readmission Within the Last 30 Days no previous admission in last 30 days   Patient currently being followed by outpatient case management? No   Patient currently receives any other outside agency services? Yes   Name and contact number of agency or person providing outside services [facility] in [place], [place]   Is it the patient/care giver preference to resume care with the current outside agency? Yes  (Pt's daughter and pt)   Do you have any problems affording any of your prescribed medications? No   Is the patient taking medications as prescribed? yes   Does the patient have transportation home? Yes   Transportation Anticipated family or friend will provide   Dialysis Name and Scheduled days MWF @ 12:00[facility]   Does the patient receive services at the Coumadin Clinic? No  (Eliquis)   Discharge Plan A Rehab   Discharge Plan B Home with family;Home Health   DME Needed Upon Discharge  none   Patient/Family in Agreement with Plan yes

## 2020-11-18 NOTE — HOSPITAL COURSE
11/18/2020 -  patient is with advanced dementia who is not aware why he is in the hospital.  No new focal neuro deficits reported other than chronic right-sided weakness from prior CVA history.  Reportedly patient was previously complaining of PEG site pain which is not present at this time.  Patient denies any nausea or vomiting.  Currently receiving hemodialysis treatment.  Patient denies chest pain or shortness of breath.  Stroke workup is underway.  Neurology has been consulted.

## 2020-11-18 NOTE — PT/OT/SLP PROGRESS
Physical Therapy      Patient Name:  Micheal Hunt   MRN:  [id number]    Patient not seen secondary to Dialysis  . Will follow-up later today.    Malaika Cerrato, PT

## 2020-11-18 NOTE — ASSESSMENT & PLAN NOTE
81 yo male with transiently slurred speech.  Sx may be exacerbation from previous stroke as he has been vomiting and may be dehydrated.    Risk assessment and modification.  No alteplase as sx have resolved and no signs of LVO.

## 2020-11-18 NOTE — ASSESSMENT & PLAN NOTE
Consult Neurology  Neuro checks q4h  Permissive HTN <220/<120 - will hold home antihypertensives and restart when clinically appropriate  MRI of brain  MRA head and neck  ECHO  Trend CBC, CMP, Mg and Phos  Fasting lipid panel  HgA1C  Consult PT/OT/ST  ASA  Hold chronic Eliquis until OK with neurology to resume.  DVT prophylaxis with LOYD's, SCD's.    Aspiration precautions, fall precautions

## 2020-11-18 NOTE — SUBJECTIVE & OBJECTIVE
Interval History:   11/18/2020 -  patient is with advanced dementia who is not aware why he is in the hospital.  No new focal neuro deficits reported other than chronic right-sided weakness from prior CVA history.  Reportedly patient was previously complaining of PEG site pain which is not present at this time.  Patient denies any nausea or vomiting.  Currently receiving hemodialysis treatment.  Patient denies chest pain or shortness of breath.    Review of Systems   Constitutional: Positive for appetite change. Negative for chills and fever.   HENT: Negative for congestion and sore throat.    Eyes: Negative for photophobia and visual disturbance.   Respiratory: Positive for cough. Negative for shortness of breath.    Cardiovascular: Negative for chest pain and palpitations.   Gastrointestinal: Positive for abdominal pain, constipation, nausea and vomiting. Negative for diarrhea.   Endocrine: Negative for polydipsia and polyuria.   Genitourinary: Negative for dysuria and frequency.   Musculoskeletal: Positive for arthralgias and gait problem (chronic).   Skin: Negative for pallor and rash.   Neurological: Positive for facial asymmetry, speech difficulty and weakness (chronic right sided weakness).   Hematological: Bruises/bleeds easily.   Psychiatric/Behavioral: Negative for agitation. The patient is not nervous/anxious.    All other systems reviewed and are negative.    Objective:     Vital Signs (Most Recent):  Temp: 98.7 °F (37.1 °C) (11/18/20 1005)  Pulse: 80 (11/18/20 1300)  Resp: 16 (11/18/20 1005)  BP: (!) 177/104 (11/18/20 1300)  SpO2: 96 % (11/18/20 0756) Vital Signs (24h Range):  Temp:  [97 °F (36.1 °C)-98.8 °F (37.1 °C)] 98.7 °F (37.1 °C)  Pulse:  [57-82] 80  Resp:  [16-19] 16  SpO2:  [93 %-100 %] 96 %  BP: (116-220)/() 177/104     Weight: 86.2 kg (190 lb 0.6 oz)  Body mass index is 25.07 kg/m².  No intake or output data in the 24 hours ending 11/18/20 1324   Physical Exam  Vitals signs and nursing  note reviewed.   Constitutional:       Appearance: He is well-developed.   HENT:      Head: Normocephalic and atraumatic.   Eyes:      Conjunctiva/sclera: Conjunctivae normal.      Pupils: Pupils are equal, round, and reactive to light.   Neck:      Musculoskeletal: Normal range of motion and neck supple.   Cardiovascular:      Rate and Rhythm: Normal rate and regular rhythm.      Pulses: Normal pulses.   Pulmonary:      Effort: Pulmonary effort is normal. No respiratory distress.      Breath sounds: Normal breath sounds.   Abdominal:      General: Bowel sounds are normal. There is no distension.      Palpations: Abdomen is soft.      Tenderness: There is no abdominal tenderness.      Comments: Peg tube in place   Genitourinary:     Comments: deferred  Musculoskeletal:      Right lower leg: No edema.      Left lower leg: No edema.      Comments: Right sided weakness which is chronic.  CG states at baseline.  Post-op shoe to right foot.  AV fistula left upper extremity, + thrill.   Skin:     General: Skin is warm and dry.      Capillary Refill: Capillary refill takes 2 to 3 seconds.   Neurological:      Mental Status: He is alert. Mental status is at baseline.      Motor: Weakness (right upper and lower extremities-baseline) present.      Comments: Pt can raise right arm off bed, poor hand .   Psychiatric:         Mood and Affect: Mood normal.         Behavior: Behavior normal.         Significant Labs:   CBC:   Recent Labs   Lab 11/17/20 1858 11/18/20  0434   WBC 7.34 7.37   HGB 10.0* 9.4*   HCT 30.8* 28.7*    139*     CMP:   Recent Labs   Lab 11/17/20 1858 11/18/20  0434    139   K 4.5 4.2    105   CO2 21* 23   * 96   BUN 27* 30*   CREATININE 5.7* 6.3*   CALCIUM 8.8 8.5*   PROT 7.2 6.6   ALBUMIN 3.9 3.5   BILITOT 0.5 0.4   ALKPHOS 77 69   AST 9* 10   ALT 12 10   ANIONGAP 13 11   EGFRNONAA 9* 8*     Lipid Panel:   Recent Labs   Lab 11/18/20  0434   CHOL 83*   HDL 25*   LDLCALC 46.6*    TRIG 57   CHOLHDL 30.1       Significant Imaging:  CT head without contrast:   Pre-existing moderate brain atrophy with extensive deep white matter ischemic changes.  Old ischemic CVA of the left temporoparietal and occipital lobes.  No acute CVA, hemorrhage or hematoma is demonstrated.    CXR: No definite evidence of acute cardiopulmonary disease.    KUB: Nonobstructive bowel gas pattern.  No definite free air.

## 2020-11-18 NOTE — PLAN OF CARE
Intervention: collaboration with care providers  Recommendation:   1) Advance PO diet to pureed pleasure feeds per SLP- do not send meal trays   2) rec. discuss goals of care with pt/family     3) Initiate Nocturnal TF Novasource renal @ 70 ml/hr x 12 hr + Promod 30 ml  TID + 30 ml flush q 4 hr at least to maintain Tub patency if pt drinking liquids  ( provides 1680 kcal + promod ( 76% EEN), 76 g protein + promod ( 100% EPN), 604 ml free water)     4) weigh pt s/p HD    Goals: 1) TF initiated in < 4 days  Nutrition Goal Status: new  Communication of RD Recs: reviewed with RN(POC, sticky note, second sign)

## 2020-11-18 NOTE — CONSULTS
[facility]  Adult Nutrition  Consult Note    SUMMARY   Intervention: collaboration with care providers  Recommendation:   1) Advance PO diet to pureed pleasure feeds per SLP- do not send meal trays   2) rec. discuss goals of care with pt/family     3) Initiate Nocturnal TF Novasource renal @ 70 ml/hr x 12 hr + Promod 30 ml  TID + 30 ml flush q 4 hr at least to maintain Tub patency if pt drinking liquids  ( provides 1680 kcal + promod ( 76% EEN), 76 g protein + promod ( 100% EPN), 604 ml free water)     4) weigh pt s/p HD    Goals: 1) TF initiated in < 4 days  Nutrition Goal Status: new  Communication of RD Recs: reviewed with RN(POC, sticky note, second sign)    Reason for Assessment    Reason For Assessment: consult  Diagnosis: (stroke)  Relevant Medical History: DM2, COPD, CAD, gout, pancreatitis, HLD, ESRD on HD, dementia, previous stroke, CHF, PEG, AFIB, anemia, obesity, HTN  Interdisciplinary Rounds: attended    General Information Comments: 81 y/o female admitted with another stroke. Per MD notes pt  had been getting nocturnal TF and eating PO during the day despite pervious SLP recommendations for pleasure feeds only r/t aspiration. Had recent PEG change r/t malfunction and per RN, who spoke with the family, he has not been getting TF x 2 weeks PTA, only eating PO. Pt says he has not had TF or eaten PO x last 2 days. NFPE done 11/18/20 mild-moderate wasting seen. Wt gain per chart review. Of note, discussed pt's dysphagia with him and pleasure feeds but pt says he wants to eat full meals despite SLP recommendations.    Nutrition Discharge Planning: to be determined- TF above + pureed pleasure feeds per SLP    Nutrition Risk Screen    Nutrition Risk Screen: tube feeding or parenteral nutrition    Nutrition/Diet History    Patient Reported Diet/Restrictions/Preferences: soft  Spiritual, Cultural Beliefs, Taoism Practices, Values that Affect Care: no  Food Allergies: NKFA  Factors  "Affecting Nutritional Intake: NPO, difficulty/impaired swallowing  Nutrition Support Formula Prior to Admit: Nepro  Nutrition Support Rate Prior to Admit: 70 (ml)  Nutrtion Support Frequency Prior to Admit: x 12 hr nocturnal  Nutrition Support Provision Prior to Admit: 1512 kcal, 68 g protein, and 610 ml free water    Anthropometrics    Temp: 98.7 °F (37.1 °C)  Height Method: Measured(office 8/3/20)  Height: 6' 1"  Height (inches): 73 in  Weight Method: Bed Scale  Weight: 86.2 kg (190 lb 0.6 oz)  Weight (lb): 190.04 lb  Ideal Body Weight (IBW), Male: 184 lb  % Ideal Body Weight, Male (lb): 103.28 %  BMI (Calculated): 25.1  BMI Grade: 25 - 29.9 - overweight  Usual Body Weight (UBW), k.5 kg(20)  % Usual Body Weight: 110.04  % Weight Change From Usual Weight: 9.81 %       Lab/Procedures/Meds    Pertinent Labs Reviewed: reviewed  BMP  Lab Results   Component Value Date     2020    K 4.2 2020     2020    CO2 23 2020    BUN 30 (H) 2020    CREATININE 6.3 (H) 2020    CALCIUM 8.5 (L) 2020    ANIONGAP 11 2020    ESTGFRAFRICA 9 (A) 2020    EGFRNONAA 8 (A) 2020     Lab Results   Component Value Date    CALCIUM 8.5 (L) 2020    PHOS 4.2 2020     POCT Glucose   Date Value Ref Range Status   2020 106 70 - 110 mg/dL Final     Lab Results   Component Value Date    HGBA1C 5.1 2020       Pertinent Medications Reviewed: reviewed  Pertinent Medications Comments: statin, senna, zofran    Estimated/Assessed Needs    Weight Used For Calorie Calculations: 86.2 kg (190 lb 0.6 oz)  Energy Calorie Requirements (kcal): 30kcal/kg ( HD vs. overweight) = 2585 kcal  Energy Need Method: Kcal/kg  Protein Requirements: 1.2-1.3 g protein/kg ( HD/ wasting) = 103-112 g  Weight Used For Protein Calculations: 86.2 kg (190 lb 0.6 oz)  Fluid Requirements (mL): 1500 ml or per MD  Estimated Fluid Requirement Method: other (see comments)  CHO Requirement: " 290-323g      Nutrition Prescription Ordered    Current Diet Order: NPO x 1 day    Evaluation of Received Nutrient/Fluid Intake    Energy Calories Required: not meeting needs  Protein Required: not meeting needs  Fluid Required: not meeting needs  Tolerance: other (see comments)(NPO)  % Intake of Estimated Energy Needs: 0%  % Meal Intake: 0%    Nutrition Risk    Level of Risk/Frequency of Follow-up: moderate 2 x weekly    Assessment and Plan    Inadequate energy intake  R/t NPO, decreased appetite  As evidenced by PO intakes < 50% x 2-3 days and mild-moderate muscle/fat loss as charted below  Intervention: above  new     Monitor and Evaluation    Food and Nutrient Intake: energy intake  Food and Nutrient Adminstration: diet order, enteral and parenteral nutrition administration  Anthropometric Measurements: weight  Biochemical Data, Medical Tests and Procedures: electrolyte and renal panel, gastrointestinal profile, glucose/endocrine profile  Nutrition-Focused Physical Findings: overall appearance     Malnutrition Assessment  Malnutrition Type: chronic illness  Skin (Micronutrient): (Patrick = 15)  Teeth (Micronutrient): (WDL)   Micronutrient Evaluation: suspected deficiency  Micronutrient Evaluation Comments: check iron, B12, folic acid       Orbital Region (Subcutaneous Fat Loss): well nourished  Upper Arm Region (Subcutaneous Fat Loss): moderate depletion  Thoracic and Lumbar Region: mild depletion   Warren Region (Muscle Loss): mild depletion  Clavicle Bone Region (Muscle Loss): mild depletion  Clavicle and Acromion Bone Region (Muscle Loss): mild depletion  Scapular Bone Region (Muscle Loss): mild depletion  Dorsal Hand (Muscle Loss): mild depletion  Patellar Region (Muscle Loss): mild depletion  Anterior Thigh Region (Muscle Loss): mild depletion  Posterior Calf Region (Muscle Loss): mild depletion   Edema (Fluid Accumulation): 0-->no edema present   Subcutaneous Fat Loss (Final Summary): mild protein-calorie  malnutrition  Muscle Loss Evaluation (Final Summary): moderate protein-calorie malnutrition         Nutrition Follow-Up    RD Follow-up?: Yes

## 2020-11-18 NOTE — H&P
[facility] Medicine  History & Physical    Patient Name: Micheal Hunt  MRN: [id number]  Admission Date: 11/17/2020  Attending Physician: Tim Arce MD   Primary Care Provider: Pk Lakhani MD         Patient information was obtained from patient, caregiver / friend, past medical records and ER records.     Subjective:     Principal Problem:Stroke    Chief Complaint:   Chief Complaint   Patient presents with    Emesis     since this morning.        HPI: Micheal Hunt is an 80-year-old male with past medical history significant for stroke, dementia, coronary artery disease, atrial fibrillation, end-stage renal disease on dialysis, type 2 diabetes, and hypertension who presented to the emergency department with an onset of facial droop which began this afternoon and has since resolved.  Patient's caregiver is at bedside and states that patient was in his usual state of health yesterday but has been vomiting today.  Caregiver reports that patient had a PEG tube placed last week  due to aspiration.  They have only used it for medication administration and some supplemental nutrition at night.  Family has still allowed patient oral intake.  Reports subjective fever this morning and increased cough.  No apparent SOB or chest pain.  She states that the PEG tube has not been used more consistently because the patient complains of pain in that area.  Reports previous stroke which occurred about a year ago with residual right-sided weakness.  Patient is wheelchair-bound. While in the ED, pt underwent a tele-stroke evaluation and tPA was not recommended as pt's symptoms had resolved and pt is on Eliquis.  He will be placed in observation for further monitoring and treatment.    Past Medical History:   Diagnosis Date    NICK (acute kidney injury) 7/29/2016    Allergy     Anemia, mild 12/15/2014    Arthritis     Gout    Atrial fibrillation 03/2019    Benign essential HTN  3/27/2012    BMI 29.0-29.9,adult 5/10/2018    BPH (benign prostatic hyperplasia)     BPH (benign prostatic hyperplasia)     CAD (coronary artery disease) 2006    Carotid artery occlusion     60-70% FROYLAN, LICA < 50%    Chronic kidney disease     due to ibuprofen    Colon polyp     CRF (chronic renal failure), stage 5     Diabetes mellitus     Diverticulosis     ESRD (end stage renal disease) on dialysis     Gastritis     GERD (gastroesophageal reflux disease)     Gout     History of colon polyps 5/3/2018    HTN (hypertension) 3/27/2012    Hyperlipidemia     Hyperlipidemia     LLL pneumonia 6/14/2018    LVH (left ventricular hypertrophy)     Mesenteric ischemia     Murmur, cardiac 3/27/2012    GRICELDA (obstructive sleep apnea)     DOES NOT USE A MACHINE    Sinus problem     Stroke 03/2019    acute left PCA    Syncope and collapse        Past Surgical History:   Procedure Laterality Date    APPENDECTOMY      CARDIAC CATHETERIZATION  2012    LIMA to LAD patent, SVG to RCA    COLONOSCOPY  2011    COLONOSCOPY N/A 5/3/2018    Procedure: COLONOSCOPY;  Surgeon: Messi Harris MD;  Location: [facility] ENDO;  Service: Endoscopy;  Laterality: N/A;    COLONOSCOPY N/A 8/18/2020    Procedure: COLONOSCOPY;  Surgeon: Messi Harris MD;  Location: [facility] ENDO;  Service: Endoscopy;  Laterality: N/A;    CORONARY ARTERY BYPASS GRAFT  4/2007    x 2 [place]    ESOPHAGOGASTRODUODENOSCOPY N/A 8/17/2020    Procedure: EGD (ESOPHAGOGASTRODUODENOSCOPY);  Surgeon: Eben Soto MD;  Location: [facility] ENDO;  Service: Endoscopy;  Laterality: N/A;    ESOPHAGOGASTRODUODENOSCOPY N/A 11/5/2020    Procedure: EGD (ESOPHAGOGASTRODUODENOSCOPY)(PEG removal and Arturo placement;  Surgeon: Messi Harris MD;  Location: [facility] ENDO;  Service: Endoscopy;  Laterality: N/A;    INSERTION OR REPLACEMENT OF PERCUTANEOUS ENDOSCOPIC JEJUNOSTOMY TUBE N/A 11/5/2020    Procedure: INSERTION OR REPLACEMENT, JEJUNOSTOMY TUBE,  PERCUTANEOUS, ENDOSCOPIC;  Surgeon: Messi Harris MD;  Location: [facility];  Service: Endoscopy;  Laterality: N/A;    JOINT REPLACEMENT      left knee total replacement  X 3    mid leftt finger      from a cactuss    MOLE REMOVAL  2016    rotative cuff      no rotative cuffs on bilat shoulders has pins     SHOULDER SURGERY      shoulder surgery bilat  RIGHT X 4; LEFT X 3       Review of patient's allergies indicates:   Allergen Reactions    Ace inhibitors Other (See Comments)     Cough    Angiotensin ii     Arb-angiotensin receptor antagonist Itching    Eplerenone Other (See Comments)     Marked bradycardia, 40, tiredness and weakness      Gabapentin Hallucinations    Sulfa (sulfonamide antibiotics) Itching and Swelling     Patient says this was 10 years ago and doesn't remember what happened       No current facility-administered medications on file prior to encounter.      Current Outpatient Medications on File Prior to Encounter   Medication Sig    allopurinoL (ZYLOPRIM) 100 MG tablet 100 mg by FEEDING TUBE route once daily.    apixaban (ELIQUIS) 2.5 mg Tab 2.5 mg by FEEDING TUBE route 2 (two) times daily.    aspirin (ECOTRIN) 81 MG EC tablet 81 mg by FEEDING TUBE route once daily.    atorvastatin (LIPITOR) 40 MG tablet 1 tablet (40 mg total) by Per G Tube route once daily.    carvediloL (COREG) 12.5 MG tablet Take 6.5 tablets (81.25 mg total) by mouth 2 (two) times daily. (Patient taking differently: 12.5 mg by Per G Tube route once daily. )    levETIRAcetam (KEPPRA) 100 mg/mL Soln 7.5 ML BY PEG DAILY    nut.tx.imp.renal fxn,lac-reduc (NEPRO CARB STEADY ORAL) 240 mLs by FEEDING TUBE route 2 (two) times daily. 6 hours apart AM and afternoon    nut.tx.imp.renal fxn,lac-reduc (NEPRO CARB STEADY ORAL) 1,000 mLs by FEEDING TUBE route every evening.     senna-docusate 8.6-50 mg (PERICOLACE) 8.6-50 mg per tablet 1 tablet by Per G Tube route once daily.    sertraline (ZOLOFT) 25 MG tablet Take 1  tablet (25 mg total) by mouth every evening.    vit b cmplx 3-fa-vit c-biotin 1- mg-mg-mcg (NEPHRO-EMMETT RX) 1- mg-mg-mcg Tab Take 1 tablet by mouth once daily. (Patient taking differently: 1 tablet by Per G Tube route once daily. )    [DISCONTINUED] levETIRAcetam (KEPPRA) 100 mg/mL Soln 7.5 ml by PEG daily (Patient taking differently: 750 mg by Per G Tube route once daily. 7.5 ml by PEG daily)     Family History     Problem Relation (Age of Onset)    Cancer Father    Heart disease Mother, Sister    Hypertension Brother    Pneumonia Sister    Stroke Sister    Sudden death Father        Tobacco Use    Smoking status: Never Smoker    Smokeless tobacco: Never Used   Substance and Sexual Activity    Alcohol use: No     Frequency: Never     Drinks per session: Patient refused     Binge frequency: Patient refused    Drug use: No    Sexual activity: Not Currently     Review of Systems   Constitutional: Positive for appetite change. Negative for chills and fever.   HENT: Negative for congestion and sore throat.    Eyes: Negative for photophobia and visual disturbance.   Respiratory: Positive for cough. Negative for shortness of breath.    Cardiovascular: Negative for chest pain and palpitations.   Gastrointestinal: Positive for abdominal pain, constipation, nausea and vomiting. Negative for diarrhea.   Endocrine: Negative for polydipsia and polyuria.   Genitourinary: Negative for dysuria and frequency.   Musculoskeletal: Positive for arthralgias and gait problem (chronic).   Skin: Negative for pallor and rash.   Neurological: Positive for facial asymmetry, speech difficulty and weakness (chronic right sided weakness).   Hematological: Bruises/bleeds easily.   Psychiatric/Behavioral: Negative for agitation. The patient is not nervous/anxious.    All other systems reviewed and are negative.    Objective:     Vital Signs (Most Recent):  Temp: 98 °F (36.7 °C) (11/17/20 2248)  Pulse: 71 (11/17/20 2248)  Resp:  18 (11/17/20 2248)  BP: (!) 180/89 (11/17/20 2248)  SpO2: 97 % (11/17/20 2248) Vital Signs (24h Range):  Temp:  [98 °F (36.7 °C)-98.8 °F (37.1 °C)] 98 °F (36.7 °C)  Pulse:  [67-82] 71  Resp:  [18] 18  SpO2:  [97 %-100 %] 97 %  BP: (173-220)/() 180/89     Weight: 86.3 kg (190 lb 4.1 oz)  Body mass index is 24.43 kg/m².    Physical Exam  Vitals signs and nursing note reviewed.   Constitutional:       Appearance: He is well-developed.   HENT:      Head: Normocephalic and atraumatic.   Eyes:      Conjunctiva/sclera: Conjunctivae normal.      Pupils: Pupils are equal, round, and reactive to light.   Neck:      Musculoskeletal: Normal range of motion and neck supple.   Cardiovascular:      Rate and Rhythm: Normal rate and regular rhythm.      Pulses: Normal pulses.   Pulmonary:      Effort: Pulmonary effort is normal. No respiratory distress.      Breath sounds: Normal breath sounds.   Abdominal:      General: Bowel sounds are normal. There is no distension.      Palpations: Abdomen is soft.      Tenderness: There is no abdominal tenderness.      Comments: Peg tube in place   Genitourinary:     Comments: deferred  Musculoskeletal:      Right lower leg: No edema.      Left lower leg: No edema.      Comments: Right sided weakness which is chronic.  CG states at baseline.  Post-op shoe to right foot.  AV fistula left upper extremity, + thrill.   Skin:     General: Skin is warm and dry.      Capillary Refill: Capillary refill takes 2 to 3 seconds.   Neurological:      Mental Status: He is alert. Mental status is at baseline.      Motor: Weakness (right upper and lower extremities-baseline) present.      Comments: Pt can raise right arm off bed, poor hand .   Psychiatric:         Mood and Affect: Mood normal.         Behavior: Behavior normal.           CRANIAL NERVES     CN III, IV, VI   Pupils are equal, round, and reactive to light.       Significant Labs:   CBC:   Recent Labs   Lab 11/17/20  1858   WBC 7.34   HGB  10.0*   HCT 30.8*        CMP:   Recent Labs   Lab 11/17/20  1858      K 4.5      CO2 21*   *   BUN 27*   CREATININE 5.7*   CALCIUM 8.8   PROT 7.2   ALBUMIN 3.9   BILITOT 0.5   ALKPHOS 77   AST 9*   ALT 12   ANIONGAP 13   EGFRNONAA 9*     TSH:   Recent Labs   Lab 11/17/20  1858   TSH 1.003     Urine Studies:   Recent Labs   Lab 11/17/20  2150   COLORU Yellow   APPEARANCEUA Hazy*   PHUR 6.0   SPECGRAV 1.025   PROTEINUA 3+*   GLUCUA Negative   KETONESU Negative   BILIRUBINUA Negative   OCCULTUA 3+*   NITRITE Negative   UROBILINOGEN Negative   LEUKOCYTESUR 1+*   RBCUA 30*   WBCUA 25*   BACTERIA Rare   HYALINECASTS 0       Significant Imaging: CT head: Pre-existing moderate brain atrophy with extensive deep white matter ischemic changes.  Old ischemic CVA of the left temporoparietal and occipital lobes.  No acute CVA, hemorrhage or hematoma is demonstrated.     CXR:  No definite evidence of acute cardiopulmonary disease.    Xray abd 2 view:  Nonobstructive bowel gas pattern.  No definite free air.    Assessment/Plan:     * Stroke  Consult Neurology  Neuro checks q4h  Permissive HTN <220/<120 - will hold home antihypertensives and restart when clinically appropriate  MRI of brain  MRA head and neck  ECHO  Trend CBC, CMP, Mg and Phos  Fasting lipid panel  HgA1C  Consult PT/OT/ST  ASA  Hold chronic Eliquis until OK with neurology to resume.  DVT prophylaxis with LOYD's, SCD's.    Aspiration precautions, fall precautions        PEG (percutaneous endoscopic gastrostomy) status  Noted.  Care per nursing.  Nutrition consult.      ESRD (end stage renal disease)  Creatine stable for now. BMP reviewed- noted Estimated Creatinine Clearance: 11.8 mL/min (A) (based on SCr of 5.7 mg/dL (H)). according to latest data. Monitor UOP and serial BMP and adjust therapy as needed. Renally dose meds.  Chronic dialysis MWF, last dialysis yesterday.  Consult nephrology.        Persistent atrial fibrillation  Patient with  Long standing persistent (>12 months) atrial fibrillation which is controlled currently with Beta Blocker. Patient is currently in atrial fibrillation. [id number] score 6. Anticoagulation indicated. Anticoagulation done with Eliquis which is currently on hold pending further recommendations from neurology..        Anemia of chronic disease  Chronic problem.  Stable.  Monitor H/H.    Transfuse for hemodynamic instability and/or H/H <7/21        GERD (gastroesophageal reflux disease)  Chronic; utilize pepcid bid acutely.      CAD (coronary artery disease), 2V CABG 2007  Patient with known CAD s/p CABG, which is controlled Will continue ASA and Statin and monitor for S/Sx of angina/ACS. Continue to monitor on telemetry.         Hyperlipidemia   Patient is chronically on statin.will continue for now. Monitor clinically. Last LDL was   Lab Results   Component Value Date    LDLCALC 46.4 (L) 07/26/2018            Essential hypertension  Chronic, stable.  Latest blood pressure and vitals reviewed-   Temp:  [98 °F (36.7 °C)-98.8 °F (37.1 °C)]   Pulse:  [67-82]   Resp:  [18]   BP: (173-220)/()   SpO2:  [97 %-100 %] .   Home meds for hypertension were reviewed and noted below. Hospital anti-hypertensive changes were made as shown below.  Hypertension Medications             carvediloL (COREG) 12.5 MG tablet Take 6.5 tablets (81.25 mg total) by mouth 2 (two) times daily.      Hospital Medications             labetaloL injection 10 mg 10 mg, Intravenous, Every 6 hours PRN, Max dose 300 mg/24hrsNotify MD if 2 doses given within 45 minutesDo not give if heart rate less than 65 and call MD      Allow Permissive HTN <220/<120      UTI (urinary tract infection)  IV rocephin  Follow culture        VTE Risk Mitigation (From admission, onward)         Ordered     IP VTE HIGH RISK PATIENT  Once      11/17/20 2247     Place sequential compression device  Until discontinued      11/17/20 2247     Reason for No Pharmacological VTE  Prophylaxis  Once     Question:  Reasons:  Answer:  Already adequately anticoagulated on oral Anticoagulants    11/17/20 3398               Advance Care Planning   Code status discussed with caregiver.  Pt is a full code.         ROSALBA White  Department of Hospital Medicine   [facility]

## 2020-11-18 NOTE — ASSESSMENT & PLAN NOTE
Chronic problem.  Stable.  Monitor H/H.    Transfuse for hemodynamic instability and/or H/H <7/21

## 2020-11-18 NOTE — ED NOTES
Notified of new symptoms, facial droop and speech difficulty,  by family member.  Reassessed by PA.

## 2020-11-18 NOTE — ASSESSMENT & PLAN NOTE
Creatine stable for now. BMP reviewed- noted Estimated Creatinine Clearance: 11.8 mL/min (A) (based on SCr of 5.7 mg/dL (H)). according to latest data. Monitor UOP and serial BMP and adjust therapy as needed. Renally dose meds.  Chronic dialysis MWF, last dialysis yesterday.  Consult nephrology.

## 2020-11-18 NOTE — PLAN OF CARE
11/18/20 1202   GARRETT Message   Date GARRETT was signed 11/18/20   Time GARRETT was signed [id number]

## 2020-11-18 NOTE — HPI
81 yo male with slurred speech that has improved since noted. He presented with various episodes of emesis today. No triggers, has not been treated. Has hx of stroke w residual sx (R weak).

## 2020-11-18 NOTE — ASSESSMENT & PLAN NOTE
Patient with Long standing persistent (>12 months) atrial fibrillation which is controlled currently with Beta Blocker. Patient is currently in atrial fibrillation. [id number] score 6. Anticoagulation indicated. Anticoagulation done with Eliquis which is currently on hold pending further recommendations from neurology..

## 2020-11-18 NOTE — SUBJECTIVE & OBJECTIVE
Past Medical History:   Diagnosis Date    NICK (acute kidney injury) 7/29/2016    Allergy     Anemia, mild 12/15/2014    Arthritis     Gout    Atrial fibrillation 03/2019    Benign essential HTN 3/27/2012    BMI 29.0-29.9,adult 5/10/2018    BPH (benign prostatic hyperplasia)     BPH (benign prostatic hyperplasia)     CAD (coronary artery disease) 2006    Carotid artery occlusion     60-70% FROYLAN, LICA < 50%    Chronic kidney disease     due to ibuprofen    Colon polyp     CRF (chronic renal failure), stage 5     Diabetes mellitus     Diverticulosis     ESRD (end stage renal disease) on dialysis     Gastritis     GERD (gastroesophageal reflux disease)     Gout     History of colon polyps 5/3/2018    HTN (hypertension) 3/27/2012    Hyperlipidemia     Hyperlipidemia     LLL pneumonia 6/14/2018    LVH (left ventricular hypertrophy)     Mesenteric ischemia     Murmur, cardiac 3/27/2012    GRICELDA (obstructive sleep apnea)     DOES NOT USE A MACHINE    Sinus problem     Stroke 03/2019    acute left PCA    Syncope and collapse        Past Surgical History:   Procedure Laterality Date    APPENDECTOMY      CARDIAC CATHETERIZATION  2012    LIMA to LAD patent, SVG to RCA    COLONOSCOPY  2011    COLONOSCOPY N/A 5/3/2018    Procedure: COLONOSCOPY;  Surgeon: Messi Harris MD;  Location: [facility];  Service: Endoscopy;  Laterality: N/A;    COLONOSCOPY N/A 8/18/2020    Procedure: COLONOSCOPY;  Surgeon: Messi Harris MD;  Location: [facility];  Service: Endoscopy;  Laterality: N/A;    CORONARY ARTERY BYPASS GRAFT  4/2007    x 2 [place]    ESOPHAGOGASTRODUODENOSCOPY N/A 8/17/2020    Procedure: EGD (ESOPHAGOGASTRODUODENOSCOPY);  Surgeon: Eben Soto MD;  Location: [facility];  Service: Endoscopy;  Laterality: N/A;    ESOPHAGOGASTRODUODENOSCOPY N/A 11/5/2020    Procedure: EGD (ESOPHAGOGASTRODUODENOSCOPY)(PEG removal and Arturo placement;  Surgeon: Messi Harris MD;  Location: [facility]  ENDO;  Service: Endoscopy;  Laterality: N/A;    INSERTION OR REPLACEMENT OF PERCUTANEOUS ENDOSCOPIC JEJUNOSTOMY TUBE N/A 11/5/2020    Procedure: INSERTION OR REPLACEMENT, JEJUNOSTOMY TUBE, PERCUTANEOUS, ENDOSCOPIC;  Surgeon: Messi Harris MD;  Location: [facility];  Service: Endoscopy;  Laterality: N/A;    JOINT REPLACEMENT      left knee total replacement  X 3    mid leftt finger      from a cactuss    MOLE REMOVAL  2016    rotative cuff      no rotative cuffs on bilat shoulders has pins     SHOULDER SURGERY      shoulder surgery bilat  RIGHT X 4; LEFT X 3       Review of patient's allergies indicates:   Allergen Reactions    Ace inhibitors Other (See Comments)     Cough    Angiotensin ii     Arb-angiotensin receptor antagonist Itching    Eplerenone Other (See Comments)     Marked bradycardia, 40, tiredness and weakness      Gabapentin Hallucinations    Sulfa (sulfonamide antibiotics) Itching and Swelling     Patient says this was 10 years ago and doesn't remember what happened       No current facility-administered medications on file prior to encounter.      Current Outpatient Medications on File Prior to Encounter   Medication Sig    allopurinoL (ZYLOPRIM) 100 MG tablet 100 mg by FEEDING TUBE route once daily.    apixaban (ELIQUIS) 2.5 mg Tab 2.5 mg by FEEDING TUBE route 2 (two) times daily.    aspirin (ECOTRIN) 81 MG EC tablet 81 mg by FEEDING TUBE route once daily.    atorvastatin (LIPITOR) 40 MG tablet 1 tablet (40 mg total) by Per G Tube route once daily.    carvediloL (COREG) 12.5 MG tablet Take 6.5 tablets (81.25 mg total) by mouth 2 (two) times daily. (Patient taking differently: 12.5 mg by Per G Tube route once daily. )    levETIRAcetam (KEPPRA) 100 mg/mL Soln 7.5 ML BY PEG DAILY    nut.tx.imp.renal fxn,lac-reduc (NEPRO CARB STEADY ORAL) 240 mLs by FEEDING TUBE route 2 (two) times daily. 6 hours apart AM and afternoon    nut.tx.imp.renal fxn,lac-reduc (NEPRO CARB STEADY ORAL) 1,000  mLs by FEEDING TUBE route every evening.     senna-docusate 8.6-50 mg (PERICOLACE) 8.6-50 mg per tablet 1 tablet by Per G Tube route once daily.    sertraline (ZOLOFT) 25 MG tablet Take 1 tablet (25 mg total) by mouth every evening.    vit b cmplx 3-fa-vit c-biotin 1- mg-mg-mcg (NEPHRO-EMMETT RX) 1- mg-mg-mcg Tab Take 1 tablet by mouth once daily. (Patient taking differently: 1 tablet by Per G Tube route once daily. )    [DISCONTINUED] levETIRAcetam (KEPPRA) 100 mg/mL Soln 7.5 ml by PEG daily (Patient taking differently: 750 mg by Per G Tube route once daily. 7.5 ml by PEG daily)     Family History     Problem Relation (Age of Onset)    Cancer Father    Heart disease Mother, Sister    Hypertension Brother    Pneumonia Sister    Stroke Sister    Sudden death Father        Tobacco Use    Smoking status: Never Smoker    Smokeless tobacco: Never Used   Substance and Sexual Activity    Alcohol use: No     Frequency: Never     Drinks per session: Patient refused     Binge frequency: Patient refused    Drug use: No    Sexual activity: Not Currently     Review of Systems   Constitutional: Positive for appetite change. Negative for chills and fever.   HENT: Negative for congestion and sore throat.    Eyes: Negative for photophobia and visual disturbance.   Respiratory: Positive for cough. Negative for shortness of breath.    Cardiovascular: Negative for chest pain and palpitations.   Gastrointestinal: Positive for abdominal pain, constipation, nausea and vomiting. Negative for diarrhea.   Endocrine: Negative for polydipsia and polyuria.   Genitourinary: Negative for dysuria and frequency.   Musculoskeletal: Positive for arthralgias and gait problem (chronic).   Skin: Negative for pallor and rash.   Neurological: Positive for facial asymmetry, speech difficulty and weakness (chronic right sided weakness).   Hematological: Bruises/bleeds easily.   Psychiatric/Behavioral: Negative for agitation. The patient  is not nervous/anxious.    All other systems reviewed and are negative.    Objective:     Vital Signs (Most Recent):  Temp: 98 °F (36.7 °C) (11/17/20 2248)  Pulse: 71 (11/17/20 2248)  Resp: 18 (11/17/20 2248)  BP: (!) 180/89 (11/17/20 2248)  SpO2: 97 % (11/17/20 2248) Vital Signs (24h Range):  Temp:  [98 °F (36.7 °C)-98.8 °F (37.1 °C)] 98 °F (36.7 °C)  Pulse:  [67-82] 71  Resp:  [18] 18  SpO2:  [97 %-100 %] 97 %  BP: (173-220)/() 180/89     Weight: 86.3 kg (190 lb 4.1 oz)  Body mass index is 24.43 kg/m².    Physical Exam  Vitals signs and nursing note reviewed.   Constitutional:       Appearance: He is well-developed.   HENT:      Head: Normocephalic and atraumatic.   Eyes:      Conjunctiva/sclera: Conjunctivae normal.      Pupils: Pupils are equal, round, and reactive to light.   Neck:      Musculoskeletal: Normal range of motion and neck supple.   Cardiovascular:      Rate and Rhythm: Normal rate and regular rhythm.      Pulses: Normal pulses.   Pulmonary:      Effort: Pulmonary effort is normal. No respiratory distress.      Breath sounds: Normal breath sounds.   Abdominal:      General: Bowel sounds are normal. There is no distension.      Palpations: Abdomen is soft.      Tenderness: There is no abdominal tenderness.      Comments: Peg tube in place   Genitourinary:     Comments: deferred  Musculoskeletal:      Right lower leg: No edema.      Left lower leg: No edema.      Comments: Right sided weakness which is chronic.  CG states at baseline.  Post-op shoe to right foot.  AV fistula left upper extremity, + thrill.   Skin:     General: Skin is warm and dry.      Capillary Refill: Capillary refill takes 2 to 3 seconds.   Neurological:      Mental Status: He is alert. Mental status is at baseline.      Motor: Weakness (right upper and lower extremities-baseline) present.      Comments: Pt can raise right arm off bed, poor hand .   Psychiatric:         Mood and Affect: Mood normal.         Behavior:  Behavior normal.           CRANIAL NERVES     CN III, IV, VI   Pupils are equal, round, and reactive to light.       Significant Labs:   CBC:   Recent Labs   Lab 11/17/20 1858   WBC 7.34   HGB 10.0*   HCT 30.8*        CMP:   Recent Labs   Lab 11/17/20 1858      K 4.5      CO2 21*   *   BUN 27*   CREATININE 5.7*   CALCIUM 8.8   PROT 7.2   ALBUMIN 3.9   BILITOT 0.5   ALKPHOS 77   AST 9*   ALT 12   ANIONGAP 13   EGFRNONAA 9*     TSH:   Recent Labs   Lab 11/17/20 1858   TSH 1.003     Urine Studies:   Recent Labs   Lab 11/17/20 2150   COLORU Yellow   APPEARANCEUA Hazy*   PHUR 6.0   SPECGRAV 1.025   PROTEINUA 3+*   GLUCUA Negative   KETONESU Negative   BILIRUBINUA Negative   OCCULTUA 3+*   NITRITE Negative   UROBILINOGEN Negative   LEUKOCYTESUR 1+*   RBCUA 30*   WBCUA 25*   BACTERIA Rare   HYALINECASTS 0       Significant Imaging: CT head: Pre-existing moderate brain atrophy with extensive deep white matter ischemic changes.  Old ischemic CVA of the left temporoparietal and occipital lobes.  No acute CVA, hemorrhage or hematoma is demonstrated.     CXR:  No definite evidence of acute cardiopulmonary disease.    Xray abd 2 view:  Nonobstructive bowel gas pattern.  No definite free air.

## 2020-11-18 NOTE — TELEPHONE ENCOUNTER
Pt has WC per Belle luna/ PT. Also informed Belle of MD recommendations. She verbalized understanding and will notify pt's family.

## 2020-11-18 NOTE — SUBJECTIVE & OBJECTIVE
"  Woke up with symptoms?: no    Recent bleeding noted: no  Does the patient take any Blood Thinners? yes  Medications: Anticoagulants:  apixaban/Eliquis      Past Medical History: hypertension, diabetes, stroke and Afib    Past Surgical History: none    Family History: no relevant history    Social History: no smoking, no drinking, no drugs    Allergies: Ace Inhibitors  Angiotensin Ii  Arb-Angiotensin Receptor Antagonist  Eplerenone  Gabapentin  Sulfa (Sulfonamide Antibiotics)     Review of Systems   Constitutional: Negative for chills and fatigue.   HENT: Negative for nosebleeds and sore throat.    Eyes: Negative for photophobia and visual disturbance.   Respiratory: Negative for cough and shortness of breath.    Cardiovascular: Negative for chest pain and palpitations.   Gastrointestinal: Negative for abdominal pain, blood in stool, constipation, diarrhea, nausea and vomiting.   Endocrine: Negative for cold intolerance and heat intolerance.   Genitourinary: Negative for difficulty urinating and hematuria.   Musculoskeletal: Negative for arthralgias, back pain, joint swelling and neck pain.   Skin: Negative for color change and rash.   Neurological: Negative for light-headedness and headaches.   Psychiatric/Behavioral: Negative for confusion and hallucinations.     Objective:   Vitals: Blood pressure (!) 220/120, pulse 82, temperature 98.8 °F (37.1 °C), temperature source Oral, resp. rate 18, height 6' 1" (1.854 m), weight 83 kg (182 lb 15.7 oz), SpO2 98 %.     CT READ: Yes  Abnormal CT chronic changes.     Physical Exam  Vitals signs reviewed.   Constitutional:       Appearance: He is well-developed.   HENT:      Head: Normocephalic and atraumatic.      Right Ear: External ear normal.      Left Ear: External ear normal.   Eyes:      Conjunctiva/sclera: Conjunctivae normal.   Neck:      Musculoskeletal: Normal range of motion.      Trachea: No tracheal deviation.   Pulmonary:      Effort: Pulmonary effort is " normal. No respiratory distress.   Abdominal:      General: There is no distension.   Musculoskeletal: Normal range of motion.   Skin:     General: Skin is dry.   Neurological:      Mental Status: He is alert.   Psychiatric:         Behavior: Behavior normal.         Thought Content: Thought content normal.         Judgment: Judgment normal.

## 2020-11-19 NOTE — PLAN OF CARE
Per Debbie, NP- the pt has hematuria in the groin area and it is painful. She asked the nurse to get a bladder scan and it can not be done right now. She is going to discontinue to the pts discharge order and the pt is staying this evening . Viktoria Cm, OSMANI     11/19/20 1515   Discharge Assessment   Assessment Type Discharge Planning Reassessment

## 2020-11-19 NOTE — PLAN OF CARE
Pt sat up on the  side of the bed by himself this evening, When asked  about why he was sitting up on the side of the bed, he stated he didn't know. Helped him back in the bed, got him changed and situated in the bed. Remains pleasantly confused, states he understands need to continue with PoC, reinforce  this teaching on each round. NAD noted, no new c/o pain, will continue to monitor.

## 2020-11-19 NOTE — CONSULTS
Nephrology Consult Note        Patient Name: Micheal Hunt  MRN: [id number]    Patient Class: OP- Observation   Admission Date: 11/17/2020  Length of Stay: 0 days  Date of Service: 11/19/2020    Attending Physician: Carlee Reeves MD  Primary Care Provider: Pk Lakhani MD    Reason for Consult: esrd/anemia/htn/shpt/stroke    SUBJECTIVE:     HPI: 80M with h/o stroke, dementia, CAD, AF, ESRD on HD MWF, DM2, HTN presented with facial droop, vomiting, cough, subjective fevers. Patient had a PEG tube placed last week due to aspiration. They have only used it for medication administration and some supplemental nutrition at night. Family has still allowed patient oral intake. PEG tube has not been used more consistently because the patient complains of pain in that area. Previous stroke occurred about a year ago with residual right-sided weakness. Patient is wheelchair-bound. While in the ED, pt underwent a tele-stroke evaluation and tPA was not recommended as pt's symptoms had resolved and pt is on Eliquis.    11/18 VSS, seen and examined on HD, tolerating well.  11/19 VSS, no new complains, HD in AM.    Past Medical History:   Diagnosis Date    NICK (acute kidney injury) 7/29/2016    Allergy     Anemia, mild 12/15/2014    Arthritis     Gout    Atrial fibrillation 03/2019    Benign essential HTN 3/27/2012    BMI 29.0-29.9,adult 5/10/2018    BPH (benign prostatic hyperplasia)     BPH (benign prostatic hyperplasia)     CAD (coronary artery disease) 2006    Carotid artery occlusion     60-70% FROYLAN, LICA < 50%    Chronic kidney disease     due to ibuprofen    Colon polyp     CRF (chronic renal failure), stage 5     Diabetes mellitus     Diverticulosis     ESRD (end stage renal disease) on dialysis     Gastritis     GERD (gastroesophageal reflux disease)     Gout     History of colon polyps 5/3/2018    HTN (hypertension) 3/27/2012    Hyperlipidemia     Hyperlipidemia     LLL pneumonia  2018    LVH (left ventricular hypertrophy)     Mesenteric ischemia     Murmur, cardiac 3/27/2012    GRICELDA (obstructive sleep apnea)     DOES NOT USE A MACHINE    Sinus problem     Stroke 2019    acute left PCA    Syncope and collapse      Past Surgical History:   Procedure Laterality Date    APPENDECTOMY      CARDIAC CATHETERIZATION      LIMA to LAD patent, SVG to RCA    COLONOSCOPY      COLONOSCOPY N/A 5/3/2018    Procedure: COLONOSCOPY;  Surgeon: Messi Harris MD;  Location: [facility] ENDO;  Service: Endoscopy;  Laterality: N/A;    COLONOSCOPY N/A 2020    Procedure: COLONOSCOPY;  Surgeon: Messi Harris MD;  Location: [facility] ENDO;  Service: Endoscopy;  Laterality: N/A;    CORONARY ARTERY BYPASS GRAFT  4/2007    x 2 [place]    ESOPHAGOGASTRODUODENOSCOPY N/A 2020    Procedure: EGD (ESOPHAGOGASTRODUODENOSCOPY);  Surgeon: Eben Soto MD;  Location: [facility] ENDO;  Service: Endoscopy;  Laterality: N/A;    ESOPHAGOGASTRODUODENOSCOPY N/A 2020    Procedure: EGD (ESOPHAGOGASTRODUODENOSCOPY)(PEG removal and Arturo placement;  Surgeon: Messi Harris MD;  Location: [facility] ENDO;  Service: Endoscopy;  Laterality: N/A;    INSERTION OR REPLACEMENT OF PERCUTANEOUS ENDOSCOPIC JEJUNOSTOMY TUBE N/A 2020    Procedure: INSERTION OR REPLACEMENT, JEJUNOSTOMY TUBE, PERCUTANEOUS, ENDOSCOPIC;  Surgeon: Messi Harris MD;  Location: [facility] ENDO;  Service: Endoscopy;  Laterality: N/A;    JOINT REPLACEMENT      left knee total replacement  X 3    mid leftt finger      from a cactuss    MOLE REMOVAL      rotative cuff      no rotative cuffs on bilat shoulders has pins     SHOULDER SURGERY      shoulder surgery bilat  RIGHT X 4; LEFT X 3     Family History   Problem Relation Age of Onset    Heart disease Mother     Sudden death Father     Cancer Father         advanced lung ca- found after 2 story fall    Stroke Sister     Pneumonia Sister          from PNA    Heart  disease Sister     Hypertension Brother     Kidney cancer Neg Hx     Prostate cancer Neg Hx     Urolithiasis Neg Hx     Allergic rhinitis Neg Hx     Allergies Neg Hx     Angioedema Neg Hx     Asthma Neg Hx     Atopy Neg Hx     Eczema Neg Hx     Immunodeficiency Neg Hx     Rhinitis Neg Hx     Urticaria Neg Hx      Social History     Tobacco Use    Smoking status: Never Smoker    Smokeless tobacco: Never Used   Substance Use Topics    Alcohol use: No     Frequency: Never     Drinks per session: Patient refused     Binge frequency: Patient refused    Drug use: No       Review of patient's allergies indicates:   Allergen Reactions    Ace inhibitors Other (See Comments)     Cough    Angiotensin ii     Arb-angiotensin receptor antagonist Itching    Eplerenone Other (See Comments)     Marked bradycardia, 40, tiredness and weakness      Gabapentin Hallucinations    Sulfa (sulfonamide antibiotics) Itching and Swelling     Patient says this was 10 years ago and doesn't remember what happened       Outpatient meds:  No current facility-administered medications on file prior to encounter.      Current Outpatient Medications on File Prior to Encounter   Medication Sig Dispense Refill    allopurinoL (ZYLOPRIM) 100 MG tablet 100 mg by FEEDING TUBE route once daily.      apixaban (ELIQUIS) 2.5 mg Tab 2.5 mg by FEEDING TUBE route 2 (two) times daily.      aspirin (ECOTRIN) 81 MG EC tablet 81 mg by FEEDING TUBE route once daily.      atorvastatin (LIPITOR) 40 MG tablet 1 tablet (40 mg total) by Per G Tube route once daily. 90 tablet 4    carvediloL (COREG) 12.5 MG tablet Take 6.5 tablets (81.25 mg total) by mouth 2 (two) times daily. (Patient taking differently: 12.5 mg by Per G Tube route once daily. ) 90 tablet 4    levETIRAcetam (KEPPRA) 100 mg/mL Soln 7.5 ML BY PEG DAILY 120 mL 5    nut.tx.imp.renal fxn,lac-reduc (NEPRO CARB STEADY ORAL) 240 mLs by FEEDING TUBE route 2 (two) times daily. 6 hours  apart AM and afternoon      nut.tx.imp.renal fxn,lac-reduc (NEPRO CARB STEADY ORAL) 1,000 mLs by FEEDING TUBE route every evening.       senna-docusate 8.6-50 mg (PERICOLACE) 8.6-50 mg per tablet 1 tablet by Per G Tube route once daily. 30 tablet 11    sertraline (ZOLOFT) 25 MG tablet Take 1 tablet (25 mg total) by mouth every evening. 30 tablet 3    vit b cmplx 3-fa-vit c-biotin 1- mg-mg-mcg (NEPHRO-EMMETT RX) 1- mg-mg-mcg Tab Take 1 tablet by mouth once daily. (Patient taking differently: 1 tablet by Per G Tube route once daily. ) 90 tablet 3    [DISCONTINUED] levETIRAcetam (KEPPRA) 100 mg/mL Soln 7.5 ml by PEG daily (Patient taking differently: 750 mg by Per G Tube route once daily. 7.5 ml by PEG daily) 120 mL 5       Scheduled meds:   allopurinoL  100 mg Per G Tube Daily    aspirin  81 mg Per G Tube Daily    atorvastatin  40 mg Per G Tube Daily    cefTRIAXone (ROCEPHIN) IVPB  1 g Intravenous Q24H    famotidine  20 mg Per G Tube Daily    levetiracetam oral soln  7.5 mL Per G Tube Daily    mupirocin   Nasal BID    neomycin-bacitracin-polymyxin   Topical (Top) BID    senna-docusate 8.6-50 mg  1 tablet Per G Tube Daily       Infusions:   sodium chloride 0.9%         PRN meds:  acetaminophen, labetaloL, ondansetron, sodium chloride 0.9%, sodium chloride 0.9%    Review of Systems:  Review of Systems   Constitutional: Positive for malaise/fatigue. Negative for chills, fever and weight loss.   HENT: Negative for hearing loss and nosebleeds.    Eyes: Negative for blurred vision, double vision and photophobia.   Respiratory: Positive for cough. Negative for wheezing.    Cardiovascular: Negative for chest pain, palpitations and leg swelling.   Gastrointestinal: Positive for abdominal pain, nausea and vomiting. Negative for constipation, diarrhea and heartburn.   Genitourinary: Negative for dysuria, frequency and urgency.   Musculoskeletal: Negative for falls, joint pain and myalgias.   Skin:  Negative for itching and rash.   Neurological: Positive for focal weakness. Negative for dizziness, speech change, loss of consciousness and headaches.   Endo/Heme/Allergies: Does not bruise/bleed easily.   Psychiatric/Behavioral: Negative for depression and substance abuse. The patient is not nervous/anxious.        OBJECTIVE:     Vital Signs and IO (Last 24H):  Temp:  [97 °F (36.1 °C)-98.4 °F (36.9 °C)]   Pulse:  [69-82]   Resp:  [16-18]   BP: (121-149)/(58-78)   SpO2:  [96 %-99 %]   I/O last 3 completed shifts:  In: 550 [Other:500; IV Piggyback:50]  Out: 3500 [Other:3500]    Wt Readings from Last 5 Encounters:   11/18/20 86.2 kg (190 lb 0.6 oz)   11/05/20 83.9 kg (185 lb)   10/06/20 82.9 kg (182 lb 12.2 oz)   09/30/20 85 kg (187 lb 6.3 oz)   09/29/20 85.3 kg (188 lb)         Physical Exam:  Physical Exam  Constitutional:       Appearance: He is well-developed. He is not diaphoretic.   HENT:      Head: Normocephalic and atraumatic.   Eyes:      General: No scleral icterus.     Pupils: Pupils are equal, round, and reactive to light.   Neck:      Musculoskeletal: Neck supple.   Cardiovascular:      Rate and Rhythm: Normal rate and regular rhythm.   Pulmonary:      Effort: Pulmonary effort is normal. No respiratory distress.      Breath sounds: No stridor.   Abdominal:      General: There is no distension.      Palpations: Abdomen is soft.   Musculoskeletal: Normal range of motion.         General: No deformity.   Skin:     General: Skin is warm and dry.      Findings: No erythema or rash.   Neurological:      Mental Status: He is alert and oriented to person, place, and time.      Cranial Nerves: No cranial nerve deficit.      Motor: Weakness and abnormal muscle tone present.   Psychiatric:         Behavior: Behavior normal.         Body mass index is 25.07 kg/m².    Laboratory:  Recent Labs   Lab 11/17/20  1858 11/18/20  0434 11/19/20  0435    139 142   K 4.5 4.2 4.1    105 105   CO2 21* 23 24   BUN 27*  30* 23   CREATININE 5.7* 6.3* 5.2*   ESTGFRAFRICA 10* 9* 11*   EGFRNONAA 9* 8* 10*   * 96 82       Recent Labs   Lab 11/17/20  1858 11/18/20  0434 11/19/20  0435   CALCIUM 8.8 8.5* 9.1   ALBUMIN 3.9 3.5 3.8   PHOS  --  4.2  --    MG  --  1.9  --        Recent Labs   Lab 07/26/18  0730 08/14/18  1020 11/12/18  0843   PTH, Intact 14.0 19.2 388.8 H       Recent Labs   Lab 11/17/20  1854   POCTGLUCOSE 106       Recent Labs   Lab 03/11/19  1109 03/21/19  0456 11/18/20  0434   Hemoglobin A1C 7.5 H 8.3 H 5.1       Recent Labs   Lab 11/17/20  1858 11/18/20  0434 11/19/20  0435   WBC 7.34 7.37 8.53   HGB 10.0* 9.4* 10.7*   HCT 30.8* 28.7* 32.6*    139* 167   MCV 98 99* 98   MCHC 32.5 32.8 32.8   MONO 5.6  0.4 7.1  0.5 7.7  0.7       Recent Labs   Lab 11/17/20  1858 11/18/20  0434 11/19/20  0435   BILITOT 0.5 0.4 0.5   PROT 7.2 6.6 7.2   ALBUMIN 3.9 3.5 3.8   ALKPHOS 77 69 79   ALT 12 10 11   AST 9* 10 10       Recent Labs   Lab 03/11/19  1823 04/02/19  0739 09/22/20  1031 11/17/20  2150   Color, UA Yellow Yellow Yellow Yellow   Appearance, UA Clear Clear Clear Hazy A   pH, UA 6.0 >8.0 A 6.0 6.0   Specific [place], UA 1.010 1.010 1.020 1.025   Protein, UA 2+ A 2+ A 2+ A 3+ A   Glucose, UA 3+ A Negative Negative Negative   Ketones, UA Negative Negative Negative Negative   Urobilinogen, UA Negative  --  Negative Negative   Bilirubin (UA) Negative Negative Negative Negative   Occult Blood UA 1+ A 1+ A 3+ A 3+ A   Nitrite, UA Negative Negative Negative Negative   RBC, UA 4 3 75 H 30 H   WBC, UA 0 2 0 25 H   Bacteria None Occasional Many A Rare   Hyaline Casts, UA 0 0 1 0       Recent Labs   Lab 03/24/19  1419 11/17/20 1911   POC PH 7.397  --    POC PCO2 31.7 L  --    POC HCO3 19.5 L  --    POC PO2 91  --    POC SATURATED O2 97  --    POC BE -5  --    Sample ARTERIAL unknown       Microbiology Results (last 7 days)     Procedure Component Value Units Date/Time    Urine culture [[id number]] Collected: 11/17/20 9820     Order Status: Completed Specimen: Urine Updated: 11/19/20 0850     Urine Culture, Routine Multiple organisms isolated. None in predominance.  Repeat if      clinically necessary.    Narrative:      Specimen Source->Urine          ASSESSMENT/PLAN:     Active Hospital Problems    Diagnosis  POA    *Stroke [I63.9]  Yes    PEG (percutaneous endoscopic gastrostomy) status [Z93.1]  Not Applicable    ESRD (end stage renal disease) [N18.6]  Yes    Persistent atrial fibrillation [I48.19]  Yes    Anemia of chronic disease [D63.8]  Yes    GERD (gastroesophageal reflux disease) [K21.9]  Yes    CAD (coronary artery disease), 2V CABG 2007 [I25.10]  Yes    Essential hypertension [I10]  Yes    Hyperlipidemia [E78.5]  Yes    UTI (urinary tract infection) [N39.0]  Yes     Dx updated per 2019 IMO Load        Resolved Hospital Problems   No resolved problems to display.       ESRD on HD MWF via AVF  Continue current dialysis prescription.  Next HD per schedule.  Renal diet - low K, low phos.  No IVs or BP checks on access and/or non-dominant arm.    Anemia of CKD  Hgb and HCT are acceptable. Monitor.  Will provide NIC and/or IV iron PRN.    MBD / Secondary HPT  Ca, phos, PTH and vitamin D levels are acceptable.   Phos binders, vitamin D analogues and calcimimetics as needed.    HTN  BP seem controlled.   Tolerate asymptomatic HTN up to -160.  Continue home meds.  Low sodium diet.    Stroke vs TIA, nausea, vomiting  Plan per primary team and neurology.  Needs to receive extra dose of Keppra AFTER HD on HD days.    Thank you for allowing us to participate in the care of your patient!   We will follow the patient and provide recommendations as needed.    Patient care time was spent personally by me on the following activities:   · Obtaining a history.  · Examination of patient.  · Providing medical care at the patients bedside.  · Developing a treatment plan with patient or surrogate and bedside  caregivers.  · Ordering and reviewing laboratory studies, radiographic studies, pulse oximetry.  · Ordering and performing treatments and interventions.  · Evaluation of patient's response to treatment.  · Discussions with consultants while on the unit and immediately available to the patient.  · Re-evaluation of the patient's condition.  · Documentation in the medical record.     Darrel Cary MD    [facility]  [address] [place] [id number]    [phone] - tel  [phone] - fax    11/19/2020

## 2020-11-19 NOTE — PROGRESS NOTES
[facility] Hospital Medicine  Progress Note    Patient Name: Micheal Hunt  MRN: [id number]  Patient Class: OP- Observation   Admission Date: 11/17/2020  Length of Stay: 0 days  Attending Physician: Carlee Reeves MD  Primary Care Provider: Pk Lakhani MD        Subjective:     Principal Problem:Stroke        HPI:  Micheal Hunt is an 80-year-old male with past medical history significant for stroke, dementia, coronary artery disease, atrial fibrillation, end-stage renal disease on dialysis, type 2 diabetes, and hypertension who presented to the emergency department with an onset of facial droop which began this afternoon and has since resolved.  Patient's caregiver is at bedside and states that patient was in his usual state of health yesterday but has been vomiting today.  Caregiver reports that patient had a PEG tube placed last week  due to aspiration.  They have only used it for medication administration and some supplemental nutrition at night.  Family has still allowed patient oral intake.  Reports subjective fever this morning and increased cough.  No apparent SOB or chest pain.  She states that the PEG tube has not been used more consistently because the patient complains of pain in that area.  Reports previous stroke which occurred about a year ago with residual right-sided weakness.  Patient is wheelchair-bound. While in the ED, pt underwent a tele-stroke evaluation and tPA was not recommended as pt's symptoms had resolved and pt is on Eliquis.  He will be placed in observation for further monitoring and treatment.    Overview/Hospital Course:  11/18/2020 -  patient is with advanced dementia who is not aware why he is in the hospital.  No new focal neuro deficits reported other than chronic right-sided weakness from prior CVA history.  Reportedly patient was previously complaining of PEG site pain which is not present at this time.  Patient denies any nausea or  vomiting.  Currently receiving hemodialysis treatment.  Patient denies chest pain or shortness of breath.  Stroke workup is underway.  Neurology has been consulted.      No new subjective & objective note has been filed under this hospital service since the last note was generated.      Assessment/Plan:      * Stroke  Follow Neurology recommendations. Neuro consult appreciated.   Neuro checks q4h  Permissive HTN <220/<120 - will hold home antihypertensives and restart when clinically appropriate  MRI of brain completed 11/17/2020 and no acute  MRA head and neck completed 11/17/2020 no changed since study 3/24/2019  ECHO -completed 11/18/2020 Patient has left ventricle hypertrophy depressed ejection fraction, atrial fibrillation, mild pulmonary hypertension Moderately severe aortic valvular stenosis and moderately severe to severe aortic regurgitation recorded  Bubble study was negative for PFO     Trend CBC, CMP, Mg and Phos  Fasting lipid panel  HgA1C  Consult PT/OT/ST  Continue aspirin 81 mg daily.  Hold chronic Eliquis until OK with neurology to resume.  DVT prophylaxis with LOYD's, SCD's.    Aspiration precautions, fall precautions        PEG (percutaneous endoscopic gastrostomy) status  Noted.  Care per nursing.  Nutrition consult.      ESRD (end stage renal disease)  Creatine stable for now. BMP reviewed- noted Estimated Creatinine Clearance: 12.6 mL/min (A) (based on SCr of 5.2 mg/dL (H)). according to latest data. Monitor UOP and serial BMP and adjust therapy as needed. Renally dose meds.  Chronic dialysis MWF, continue hemodialysis therapy as per Nephrology team.      11/19/2020  Cont current tx plan      Persistent atrial fibrillation  Patient with Long standing persistent (>12 months) atrial fibrillation which is controlled currently with Beta Blocker. Patient is currently in atrial fibrillation. [id number] score 6. Anticoagulation indicated. Anticoagulation done with Eliquis which is currently on hold  pending further recommendations from neurology. If CVA is ruled out resume Eliquis once okay with neurology team.         Anemia of chronic disease  Chronic problem.  Stable.  Monitor H/H.    Transfuse for hemodynamic instability and/or H/H <7/21        GERD (gastroesophageal reflux disease)  Chronic; utilize pepcid bid acutely.      CAD (coronary artery disease), 2V CABG 2007  Patient with known CAD s/p CABG, which is controlled Will continue ASA and Statin and monitor for S/Sx of angina/ACS. Continue to monitor on telemetry.         Hyperlipidemia   Patient is chronically on statin.will continue for now. Monitor clinically. Last LDL was   Lab Results   Component Value Date    LDLCALC 46.6 (L) 11/18/2020            Essential hypertension  Chronic, stable.  Latest blood pressure and vitals reviewed-   Temp:  [97 °F (36.1 °C)-98.8 °F (37.1 °C)]   Pulse:  [57-82]   Resp:  [16-19]   BP: (116-220)/()   SpO2:  [93 %-100 %] .   Home meds for hypertension were reviewed and noted below. Hospital anti-hypertensive changes were made as shown below.  Hypertension Medications             carvediloL (COREG) 12.5 MG tablet Take 6.5 tablets (81.25 mg total) by mouth 2 (two) times daily.      Hospital Medications             labetaloL injection 10 mg 10 mg, Intravenous, Every 6 hours PRN, Max dose 300 mg/24hrs<BR>Notify MD if 2 doses given within 45 minutes<BR>Do not give if heart rate less than 65 and call MD      Allow Permissive HTN <220/<120      UTI (urinary tract infection)  Urine culture resulted on 11/19/2020    Urine Culture, Routine Multiple organisms isolated. None in predominance.       DC IV ceftrioxone  Begin Cefalexin per g tube      VTE Risk Mitigation (From admission, onward)         Ordered     apixaban tablet 2.5 mg  2 times daily      11/19/20 1514     IP VTE HIGH RISK PATIENT  Once      11/17/20 2247     Place sequential compression device  Until discontinued      11/17/20 2247     Reason for No  Pharmacological VTE Prophylaxis  Once     Question:  Reasons:  Answer:  Already adequately anticoagulated on oral Anticoagulants    11/17/20 2247                Discharge Planning   MARKELL: 11/19/2020     Code Status: Full Code   Is the patient medically ready for discharge?:     Reason for patient still in hospital (select all that apply): Treatment  Discharge Plan A: Rehab                  Debbie Sofia NP  Department of Hospital Medicine   [facility]

## 2020-11-19 NOTE — PLAN OF CARE
11/18/20 8910   Patient Assessment/Suction   Level of Consciousness (AVPU) alert   Respiratory Effort Normal;Unlabored   Expansion/Accessory Muscles/Retractions no retractions;no use of accessory muscles   All Lung Fields Breath Sounds clear;Anterior:;Lateral:   Cough Frequency infrequent   Cough Type nonproductive   PRE-TX-O2   O2 Device (Oxygen Therapy) room air   SpO2 96 %   Pulse Oximetry Type Intermittent   $ Pulse Oximetry - Multiple Charge Pulse Oximetry - Multiple   Pulse 69   Resp 18

## 2020-11-19 NOTE — SUBJECTIVE & OBJECTIVE
Subjective: 11/19/2020  LOS 2   FU stroke    Interval History: Mr CHO was seen and examined. Caregiver at bedside. Documentation, labs, and diagnostics reviewed. Appreciate neurology consult.     Stroke:   Per neurology, no stroke on MRI. Sx were likely due to worsening of initial stroke symptoms secondary to GI infection.   MRI of brain completed 11/17/2020 and no acute  MRA head and neck completed 11/17/2020 no changed since study 3/24/2019  ECHO -completed 11/18/2020 Patient has left ventricle hypertrophy depressed ejection fraction, atrial fibrillation, mild pulmonary hypertension Moderately severe aortic valvular stenosis and moderately severe to severe aortic regurgitation recorded Bubble study was negative for PFO    UTI  11/19/2020  Urine Culture, Routine Multiple organisms isolated. None in predominance.     DC IV cetrioxone and begin Cefalexin per  gtube  Noted to have blood on diaper. Suspect trauma to penis. Will do in and out catheter ua to compare to previous.   Nurse unable to obtain, bladder scan only 40 ml in bladder.  Will cancel dc today and plan dc in am. As family very concerned about dc now.     ESRD  Cont current tx plan      Review of Systems   Constitutional: Positive for activity change and appetite change. Negative for chills, fatigue and fever.   Respiratory: Negative for cough and shortness of breath.    Cardiovascular: Negative for chest pain and palpitations.   Gastrointestinal: Negative for constipation, nausea and vomiting.   Genitourinary: Positive for hematuria.        24 hr sitter at bedside stated that she saw blood in diaper earlier today   Musculoskeletal: Positive for gait problem.        PMH stroke   Neurological: Positive for facial asymmetry, speech difficulty and weakness.        PMH stroke   Psychiatric/Behavioral: Positive for confusion and decreased concentration.        PMH positive for dementia and previous stroke, 24 hour caregiver at bedside says he is a baseline  "today     Objective:     Vital Signs (Most Recent):  Temp: 97.8 °F (36.6 °C) (11/19/20 0800)  Pulse: 77 (11/19/20 0800)  Resp: 16 (11/19/20 0800)  BP: (!) 149/77 (11/19/20 0800)  SpO2: 97 % (11/19/20 0800) Vital Signs (24h Range):  Temp:  [97 °F (36.1 °C)-98.4 °F (36.9 °C)] 97.8 °F (36.6 °C)  Pulse:  [57-82] 77  Resp:  [16-18] 16  SpO2:  [96 %-99 %] 97 %  BP: (121-185)/() 149/77     Weight: 86.2 kg (190 lb 0.6 oz)  Body mass index is 25.07 kg/m².    Intake/Output Summary (Last 24 hours) at 11/19/2020 1049  Last data filed at 11/18/2020 2244  Gross per 24 hour   Intake 550 ml   Output 3500 ml   Net -2950 ml      Physical Exam  Vitals signs and nursing note reviewed. Exam conducted with a chaperone present.   Constitutional:       General: He is not in acute distress.     Appearance: He is not ill-appearing or toxic-appearing.   HENT:      Head: Normocephalic and atraumatic.   Neck:      Musculoskeletal: Normal range of motion.   Cardiovascular:      Rate and Rhythm: Normal rate and regular rhythm.      Pulses: Normal pulses.      Heart sounds: Normal heart sounds.   Pulmonary:      Effort: Pulmonary effort is normal.      Breath sounds: Normal breath sounds.   Abdominal:      General: Bowel sounds are normal.      Palpations: Abdomen is soft.      Tenderness: There is no abdominal tenderness. There is no guarding or rebound.      Comments: Peg tube in place   Genitourinary:     Comments: No trauma visible on penis.  Musculoskeletal:      Right lower leg: No edema.      Left lower leg: No edema.      Comments: Right sided weakness, caregiver reported this is chronic   Skin:     General: Skin is warm and dry.      Capillary Refill: Capillary refill takes less than 2 seconds.   Neurological:      Mental Status: He is alert. He is disoriented.      Comments: Yells out "Hey" frequently. Oriented x 1   Psychiatric:      Comments: At baseline. Has PMH dementia.         Significant Labs:   CBC:   Recent Labs   Lab " 11/17/20 1858 11/18/20 0434 11/19/20  0435   WBC 7.34 7.37 8.53   HGB 10.0* 9.4* 10.7*   HCT 30.8* 28.7* 32.6*    139* 167     CMP:   Recent Labs   Lab 11/17/20 1858 11/18/20 0434 11/19/20 0435    139 142   K 4.5 4.2 4.1    105 105   CO2 21* 23 24   * 96 82   BUN 27* 30* 23   CREATININE 5.7* 6.3* 5.2*   CALCIUM 8.8 8.5* 9.1   PROT 7.2 6.6 7.2   ALBUMIN 3.9 3.5 3.8   BILITOT 0.5 0.4 0.5   ALKPHOS 77 69 79   AST 9* 10 10   ALT 12 10 11   ANIONGAP 13 11 13   EGFRNONAA 9* 8* 10*       Significant Imaging: I have reviewed all pertinent imaging results/findings within the past 24 hours.

## 2020-11-19 NOTE — ASSESSMENT & PLAN NOTE
Urine culture resulted on 11/19/2020    Urine Culture, Routine Multiple organisms isolated. None in predominance.       DC IV ceftrioxone  Begin Cefalexin per g tube

## 2020-11-19 NOTE — PROGRESS NOTES
[facility] Hospital Medicine  Progress Note    Patient Name: Micheal Hunt  MRN: [id number]  Patient Class: OP- Observation   Admission Date: 11/17/2020  Length of Stay: 0 days  Attending Physician: Carlee Reeves MD  Primary Care Provider: Pk Lakhani MD        Subjective:     Principal Problem:Stroke        HPI:  Micheal Hunt is an 80-year-old male with past medical history significant for stroke, dementia, coronary artery disease, atrial fibrillation, end-stage renal disease on dialysis, type 2 diabetes, and hypertension who presented to the emergency department with an onset of facial droop which began this afternoon and has since resolved.  Patient's caregiver is at bedside and states that patient was in his usual state of health yesterday but has been vomiting today.  Caregiver reports that patient had a PEG tube placed last week  due to aspiration.  They have only used it for medication administration and some supplemental nutrition at night.  Family has still allowed patient oral intake.  Reports subjective fever this morning and increased cough.  No apparent SOB or chest pain.  She states that the PEG tube has not been used more consistently because the patient complains of pain in that area.  Reports previous stroke which occurred about a year ago with residual right-sided weakness.  Patient is wheelchair-bound. While in the ED, pt underwent a tele-stroke evaluation and tPA was not recommended as pt's symptoms had resolved and pt is on Eliquis.  He will be placed in observation for further monitoring and treatment.    Overview/Hospital Course:  11/18/2020 -  patient is with advanced dementia who is not aware why he is in the hospital.  No new focal neuro deficits reported other than chronic right-sided weakness from prior CVA history.  Reportedly patient was previously complaining of PEG site pain which is not present at this time.  Patient denies any nausea or  vomiting.  Currently receiving hemodialysis treatment.  Patient denies chest pain or shortness of breath.  Stroke workup is underway.  Neurology has been consulted.      Subjective: 11/19/2020  LOS 2   FU stroke    Interval History: Mr CHO was seen and examined. Caregiver at bedside. Documentation, labs, and diagnostics reviewed. Appreciate neurology consult.     Stroke:   Per neurology, no stroke on MRI. Sx were likely due to worsening of initial stroke symptoms secondary to GI infection.   MRI of brain completed 11/17/2020 and no acute  MRA head and neck completed 11/17/2020 no changed since study 3/24/2019  ECHO -completed 11/18/2020 Patient has left ventricle hypertrophy depressed ejection fraction, atrial fibrillation, mild pulmonary hypertension Moderately severe aortic valvular stenosis and moderately severe to severe aortic regurgitation recorded Bubble study was negative for PFO    UTI  11/19/2020  Urine Culture, Routine Multiple organisms isolated. None in predominance.     DC IV cetrioxone and begin Cefalexin per  gtube  Noted to have blood on diaper. Suspect trauma to penis. Will do in and out catheter ua to compare to previous.   Nurse unable to obtain, bladder scan only 40 ml in bladder.  Will cancel dc today and plan dc in am. As family very concerned about dc now.     ESRD  Cont current tx plan      Review of Systems   Constitutional: Positive for activity change and appetite change. Negative for chills, fatigue and fever.   Respiratory: Negative for cough and shortness of breath.    Cardiovascular: Negative for chest pain and palpitations.   Gastrointestinal: Negative for constipation, nausea and vomiting.   Genitourinary: Positive for hematuria.        24 hr sitter at bedside stated that she saw blood in diaper earlier today   Musculoskeletal: Positive for gait problem.        PMH stroke   Neurological: Positive for facial asymmetry, speech difficulty and weakness.        PMH stroke    Psychiatric/Behavioral: Positive for confusion and decreased concentration.        PMH positive for dementia and previous stroke, 24 hour caregiver at bedside says he is a baseline today     Objective:     Vital Signs (Most Recent):  Temp: 97.8 °F (36.6 °C) (11/19/20 0800)  Pulse: 77 (11/19/20 0800)  Resp: 16 (11/19/20 0800)  BP: (!) 149/77 (11/19/20 0800)  SpO2: 97 % (11/19/20 0800) Vital Signs (24h Range):  Temp:  [97 °F (36.1 °C)-98.4 °F (36.9 °C)] 97.8 °F (36.6 °C)  Pulse:  [57-82] 77  Resp:  [16-18] 16  SpO2:  [96 %-99 %] 97 %  BP: (121-185)/() 149/77     Weight: 86.2 kg (190 lb 0.6 oz)  Body mass index is 25.07 kg/m².    Intake/Output Summary (Last 24 hours) at 11/19/2020 1049  Last data filed at 11/18/2020 2244  Gross per 24 hour   Intake 550 ml   Output 3500 ml   Net -2950 ml      Physical Exam  Vitals signs and nursing note reviewed. Exam conducted with a chaperone present.   Constitutional:       General: He is not in acute distress.     Appearance: He is not ill-appearing or toxic-appearing.   HENT:      Head: Normocephalic and atraumatic.   Neck:      Musculoskeletal: Normal range of motion.   Cardiovascular:      Rate and Rhythm: Normal rate and regular rhythm.      Pulses: Normal pulses.      Heart sounds: Normal heart sounds.   Pulmonary:      Effort: Pulmonary effort is normal.      Breath sounds: Normal breath sounds.   Abdominal:      General: Bowel sounds are normal.      Palpations: Abdomen is soft.      Tenderness: There is no abdominal tenderness. There is no guarding or rebound.      Comments: Peg tube in place   Genitourinary:     Comments: No trauma visible on penis.  Musculoskeletal:      Right lower leg: No edema.      Left lower leg: No edema.      Comments: Right sided weakness, caregiver reported this is chronic   Skin:     General: Skin is warm and dry.      Capillary Refill: Capillary refill takes less than 2 seconds.   Neurological:      Mental Status: He is alert. He is  "disoriented.      Comments: Yells out "Hey" frequently. Oriented x 1   Psychiatric:      Comments: At baseline. Has PMH dementia.         Significant Labs:   CBC:   Recent Labs   Lab 11/17/20 1858 11/18/20 0434 11/19/20 0435   WBC 7.34 7.37 8.53   HGB 10.0* 9.4* 10.7*   HCT 30.8* 28.7* 32.6*    139* 167     CMP:   Recent Labs   Lab 11/17/20 1858 11/18/20 0434 11/19/20 0435    139 142   K 4.5 4.2 4.1    105 105   CO2 21* 23 24   * 96 82   BUN 27* 30* 23   CREATININE 5.7* 6.3* 5.2*   CALCIUM 8.8 8.5* 9.1   PROT 7.2 6.6 7.2   ALBUMIN 3.9 3.5 3.8   BILITOT 0.5 0.4 0.5   ALKPHOS 77 69 79   AST 9* 10 10   ALT 12 10 11   ANIONGAP 13 11 13   EGFRNONAA 9* 8* 10*       Significant Imaging: I have reviewed all pertinent imaging results/findings within the past 24 hours.      Assessment/Plan:      * Stroke  Follow Neurology recommendations. Neuro consult appreciated.   Neuro checks q4h  Permissive HTN <220/<120 - will hold home antihypertensives and restart when clinically appropriate  MRI of brain completed 11/17/2020 and no acute  MRA head and neck completed 11/17/2020 no changed since study 3/24/2019  ECHO -completed 11/18/2020 Patient has left ventricle hypertrophy depressed ejection fraction, atrial fibrillation, mild pulmonary hypertension Moderately severe aortic valvular stenosis and moderately severe to severe aortic regurgitation recorded  Bubble study was negative for PFO     Trend CBC, CMP, Mg and Phos  Fasting lipid panel  HgA1C  Consult PT/OT/ST  Continue aspirin 81 mg daily.  Hold chronic Eliquis until OK with neurology to resume.  DVT prophylaxis with LOYD's, SCD's.    Aspiration precautions, fall precautions        PEG (percutaneous endoscopic gastrostomy) status  Noted.  Care per nursing.  Nutrition consult.      ESRD (end stage renal disease)  Creatine stable for now. BMP reviewed- noted Estimated Creatinine Clearance: 12.6 mL/min (A) (based on SCr of 5.2 mg/dL (H)). according " to latest data. Monitor UOP and serial BMP and adjust therapy as needed. Renally dose meds.  Chronic dialysis MWF, continue hemodialysis therapy as per Nephrology team.      11/19/2020  Cont current tx plan      Persistent atrial fibrillation  Patient with Long standing persistent (>12 months) atrial fibrillation which is controlled currently with Beta Blocker. Patient is currently in atrial fibrillation. [id number] score 6. Anticoagulation indicated. Anticoagulation done with Eliquis which is currently on hold pending further recommendations from neurology. If CVA is ruled out resume Eliquis once okay with neurology team.         Anemia of chronic disease  Chronic problem.  Stable.  Monitor H/H.    Transfuse for hemodynamic instability and/or H/H <7/21        GERD (gastroesophageal reflux disease)  Chronic; utilize pepcid bid acutely.      CAD (coronary artery disease), 2V CABG 2007  Patient with known CAD s/p CABG, which is controlled Will continue ASA and Statin and monitor for S/Sx of angina/ACS. Continue to monitor on telemetry.         Hyperlipidemia   Patient is chronically on statin.will continue for now. Monitor clinically. Last LDL was   Lab Results   Component Value Date    LDLCALC 46.6 (L) 11/18/2020            Essential hypertension  Chronic, stable.  Latest blood pressure and vitals reviewed-   Temp:  [97 °F (36.1 °C)-98.8 °F (37.1 °C)]   Pulse:  [57-82]   Resp:  [16-19]   BP: (116-220)/()   SpO2:  [93 %-100 %] .   Home meds for hypertension were reviewed and noted below. Hospital anti-hypertensive changes were made as shown below.  Hypertension Medications             carvediloL (COREG) 12.5 MG tablet Take 6.5 tablets (81.25 mg total) by mouth 2 (two) times daily.      Hospital Medications             labetaloL injection 10 mg 10 mg, Intravenous, Every 6 hours PRN, Max dose 300 mg/24hrs<BR>Notify MD if 2 doses given within 45 minutes<BR>Do not give if heart rate less than 65 and call MD       Allow Permissive HTN <220/<120      UTI (urinary tract infection)  Urine culture resulted on 11/19/2020    Urine Culture, Routine Multiple organisms isolated. None in predominance.       DC IV ceftrioxone  Begin Cefalexin per g tube      VTE Risk Mitigation (From admission, onward)         Ordered     apixaban tablet 2.5 mg  2 times daily      11/19/20 1514     IP VTE HIGH RISK PATIENT  Once      11/17/20 2247     Place sequential compression device  Until discontinued      11/17/20 2247     Reason for No Pharmacological VTE Prophylaxis  Once     Question:  Reasons:  Answer:  Already adequately anticoagulated on oral Anticoagulants    11/17/20 2247                Discharge Planning   MARKELL: 11/19/2020     Code Status: Full Code   Is the patient medically ready for discharge?:     Reason for patient still in hospital (select all that apply): Pending disposition  Discharge Plan A: Rehab                  Debbie Sofia NP  Department of Hospital Medicine   [facility]

## 2020-11-19 NOTE — PT/OT/SLP EVAL
"Occupational Therapy   Evaluation and Discharge Note    Name: Micheal Hunt  MRN: [id number]  Admitting Diagnosis:  Stroke      Recommendations:     Discharge Recommendations: home with home health  Discharge Equipment Recommendations:  none  Barriers to discharge:  None    Assessment:     Micheal Hunt is a 80 y.o. male with a medical diagnosis of StrokePt's caregiver was present and provided extensive information on pt's PLOF. R UE flaccid and R fingers noted to have moderate flexion contractures. PROM HEP provided to caregiver who verbalized understanding. At this time, patient is functioning at their prior level of function and does not require further acute OT services.       Plan:     During this hospitalization, patient does not require further acute OT services.  Please re-consult if situation changes.    · Plan of Care Reviewed with: patient, caregiver    Subjective     Chief Complaint: None  Patient/Family Comments/goals: "My right side is dead."    Occupational Profile:  Living Environment: Pt lives alone in a [facility] with a ramp to enter.He has daily caregivers from 8AM-8PM. His daughter lives nearby and can video monitor pt in his bedroom at night. He also has a life alert.  Previous level of function: Pt required at least Moderate assistance for bed mobility, transfers to wheelchair and for all self care other than self feeding. Pt appears very well cared for and caregiver stated that pt prefers to have most tasks done for him, even though he can do many things. He is wheelchair bound.  Roles and Routines: Pt enjoys watching western movies and sitting on his porch.  Equipment Used at home:  wheelchair, raised toilet, hospital bed, bath bench, feeding device(life alert)  Assistance upon Discharge: Caregivers will assist pt as they have been doing.    Pain/Comfort:  · Pain Rating 1: 0/10  · Pain Rating Post-Intervention 1: 0/10    Patients cultural, spiritual, [employer]ybeterian conflicts given the " current situation:      Objective:     Communicated with: nurse Cely prior to session.  Patient found HOB elevated with bed alarm, telemetry, PEG Tube upon OT entry to room.    General Precautions: Standard, fall, aspiration, pureed diet   Orthopedic Precautions:N/A   Braces: N/A     Occupational Performance:    Activities of Daily Living:  · Feeding:  set-up to sip water from a cup with L hand and HOB raised    · Grooming: minimum assistance to comb hair though when OT handed pt the comb he tried to give it to his caregiver. He reluctantly combed it when asked to do so.  · Lower Body Dressing: dependence to don/doff socks    Cognitive/Visual Perceptual:  Cognitive/Psychosocial Skills:     -       Oriented to: Person   -       Follows Commands/attention:Follows one-step commands  -       Communication: expressive aphasia  -       Safety awareness/insight to disability: impaired   -       Mood/Affect/Coping skills/emotional control: Lethargic and Confused  Visual/Perceptual:      -Intact acuity    Physical Exam:  Postural examination/scapula alignment:    -       Rounded shoulders  -       Forward head  Skin integrity: Visible skin intact  Edema:  None noted  Sensation:    -       Impaired  R UE & LE  Dominant hand:    -       L hand since his CVA  Upper Extremity Range of Motion:     -       Right Upper Extremity: moderate finger flexion contractures, mild shoulder GH subluxation, flaccid with no active movement noted  -       Left Upper Extremity: WFL  Upper Extremity Strength:    -       Right Upper Extremity: 0/5  -       Left Upper Extremity: WNL   Strength:    -       Left Upper Extremity: WNL  Fine Motor Coordination:    -       Intact  Left hand, finger to nose, Left hand thumb/finger opposition skills and Left hand, manipulation of objects  -       Impaired  Right hand, finger to nose  , Right hand thumb/finger opposition skills   and Right hand, manipulation of objects    Gross motor coordination:    hemiplegia/paresis    AMPAC 6 Click ADL:  AMPAC Total Score: 12    Treatment & Education:  OT ed pt on OT role & POC as well as discharge recommendations.  To prevent further finger flexion contractures, OT provided pt & caregiver a R UE PROM HEP with caregiver verbalizing understanding.  OT ed pt on keeping HOB raised and sitting up in chair as pt will tolerate to counteract effects of bedrest.  OT ed pt on fall risk and strongly advised pt to call for help for all OOB mobility.    Education:    Patient left HOB elevated with all lines intact, call button in reach, bed alarm on and Caregiver present    GOALS:   Multidisciplinary Problems     Occupational Therapy Goals     Not on file                History:     Past Medical History:   Diagnosis Date    NICK (acute kidney injury) 7/29/2016    Allergy     Anemia, mild 12/15/2014    Arthritis     Gout    Atrial fibrillation 03/2019    Benign essential HTN 3/27/2012    BMI 29.0-29.9,adult 5/10/2018    BPH (benign prostatic hyperplasia)     BPH (benign prostatic hyperplasia)     CAD (coronary artery disease) 2006    Carotid artery occlusion     60-70% FROYLAN, LICA < 50%    Chronic kidney disease     due to ibuprofen    Colon polyp     CRF (chronic renal failure), stage 5     Diabetes mellitus     Diverticulosis     ESRD (end stage renal disease) on dialysis     Gastritis     GERD (gastroesophageal reflux disease)     Gout     History of colon polyps 5/3/2018    HTN (hypertension) 3/27/2012    Hyperlipidemia     Hyperlipidemia     LLL pneumonia 6/14/2018    LVH (left ventricular hypertrophy)     Mesenteric ischemia     Murmur, cardiac 3/27/2012    GRICELDA (obstructive sleep apnea)     DOES NOT USE A MACHINE    Sinus problem     Stroke 03/2019    acute left PCA    Syncope and collapse        Past Surgical History:   Procedure Laterality Date    APPENDECTOMY      CARDIAC CATHETERIZATION  2012    LIMA to LAD patent, SVG to RCA    COLONOSCOPY   2011    COLONOSCOPY N/A 5/3/2018    Procedure: COLONOSCOPY;  Surgeon: Messi Harris MD;  Location: [facility] ENDO;  Service: Endoscopy;  Laterality: N/A;    COLONOSCOPY N/A 8/18/2020    Procedure: COLONOSCOPY;  Surgeon: Messi Harris MD;  Location: [facility] ENDO;  Service: Endoscopy;  Laterality: N/A;    CORONARY ARTERY BYPASS GRAFT  4/2007    x 2 [place]    ESOPHAGOGASTRODUODENOSCOPY N/A 8/17/2020    Procedure: EGD (ESOPHAGOGASTRODUODENOSCOPY);  Surgeon: Eben Soto MD;  Location: [facility] ENDO;  Service: Endoscopy;  Laterality: N/A;    ESOPHAGOGASTRODUODENOSCOPY N/A 11/5/2020    Procedure: EGD (ESOPHAGOGASTRODUODENOSCOPY)(PEG removal and Arturo placement;  Surgeon: Messi Harris MD;  Location: [facility] ENDO;  Service: Endoscopy;  Laterality: N/A;    INSERTION OR REPLACEMENT OF PERCUTANEOUS ENDOSCOPIC JEJUNOSTOMY TUBE N/A 11/5/2020    Procedure: INSERTION OR REPLACEMENT, JEJUNOSTOMY TUBE, PERCUTANEOUS, ENDOSCOPIC;  Surgeon: Messi Harris MD;  Location: [facility] ENDO;  Service: Endoscopy;  Laterality: N/A;    JOINT REPLACEMENT      left knee total replacement  X 3    mid leftt finger      from a cactuss    MOLE REMOVAL  2016    rotative cuff      no rotative cuffs on bilat shoulders has pins     SHOULDER SURGERY      shoulder surgery bilat  RIGHT X 4; LEFT X 3       Time Tracking:     OT Date of Treatment: 11/19/20  OT Start Time: 1004  OT Stop Time: 1050  OT Total Time (min): 46 min    Billable Minutes:Evaluation 15  Self Care/Home Management 16  Therapeutic Exercise 15    DIANE Hernandez  11/19/2020

## 2020-11-19 NOTE — PLAN OF CARE
Problem: Physical Therapy Goal  Goal: Physical Therapy Goal  Description: Goals to be met by: 2020     Patient will increase functional independence with mobility by performin. Supine to sit with MInimal Assistance  2. Sit to stand transfer with Minimal Assistance  3. Bed to chair transfer with Minimal Assistance using Nishant-walker  4. Gait  x 50 feet with Moderate Assistance using Nishant-walker.   5. Lower extremity exercise program x20 reps   Outcome: Ongoing, Progressing   PT eval and treat initiated. Pt sat EOB min assist, standing with mod assist- took 2 steps- limited by dizziness. Pt will benefit from HHPT . Has caregivers 12hrs day x 7 days

## 2020-11-19 NOTE — PLAN OF CARE
The pt is cleared for discharge from case management. Viktoria Cm LCSW     11/19/20 7339   Final Note   Assessment Type Final Discharge Note   Anticipated Discharge Disposition Home-Health   Hospital Follow Up  Appt(s) scheduled? Yes

## 2020-11-19 NOTE — PLAN OF CARE
Joni spoke with Maya Zabala [phone], she at pt's bedside. Apparently pt has 12 hour sitters daily. Maya is the pt's caregiver.  The daughter lives next door to pt.  When the sitter leaves after 7pm, the camera is on pt, so daughter can watch him form home.  Maya will be taking pt home.  Abhijeet will resume hh. No other needs verbalized.       11/19/20 1146   Discharge Reassessment   Assessment Type Discharge Planning Reassessment

## 2020-11-19 NOTE — PLAN OF CARE
I sent the pts HH orders and HH packet to [facility] [place] to resume HH services. AVS updated. Viktoria Cm LCSW

## 2020-11-19 NOTE — PLAN OF CARE
Primitivo Isaacs pt is accepted to resume services with [facility]. Viktoria Cm, KATYAW  Date Status/Notes Created By   11/19/2020 11:50:36 AM Completed  Viktoria Cm  11/19/2020 10:05:55 AM Accepted  Alyssa [email or web address]  11/19/2020 7:55:04 AM Eric Pizarro   11/19/20 1207   Post-Acute Status   Post-Acute Authorization Home Health   Home Health Status Set-up Complete

## 2020-11-19 NOTE — PLAN OF CARE
I sent JOE Lewis a secure chat requesting HH orders to resume with [facility]. Viktoria Cm, KATYAW     11/19/20 1146   Post-Acute Status   Post-Acute Authorization Home Health   Home Health Status Awaiting Orders for HH

## 2020-11-19 NOTE — PLAN OF CARE
Joni called PT department, spoke with Khushi. Joni asked Khushi to please ask PT/OT to see pt this morning. I need evaluation to help with discharge placement.       11/19/20 0809   Discharge Reassessment   Assessment Type Discharge Planning Reassessment

## 2020-11-19 NOTE — RESPIRATORY THERAPY
11/19/20 0659   Patient Assessment/Suction   Level of Consciousness (AVPU) alert   PRE-TX-O2   O2 Device (Oxygen Therapy) room air   SpO2 99 %   Pulse Oximetry Type Intermittent   $ Pulse Oximetry - Multiple Charge Pulse Oximetry - Multiple   Pulse 75   Resp 18

## 2020-11-19 NOTE — PT/OT/SLP EVAL
Physical Therapy Evaluation    Patient Name:  Micheal Hunt   MRN:  [id number]    Recommendations:     Discharge Recommendations:  home health PT   Discharge Equipment Recommendations: none   Barriers to discharge: None    Assessment:     Micheal Hunt is a 80 y.o. male admitted with a medical diagnosis of Stroke.  He presents with the following impairments/functional limitations:  weakness, impaired endurance, impaired self care skills, impaired functional mobilty, gait instability, impaired balance, impaired cognition, decreased upper extremity function, decreased lower extremity function, decreased safety awareness . Pt alert and eager for OOB. Caregiver at bedside who stated pt has HHPT 2x week and caregivers 12 hrs/day x 7 days. Pt sat EOB with c/o dzziness /94 SAT 98% on RA. Pt completed LE's thera ex. Pt assisted standing mod assist with hemiwalker c/o increased dizziness. Pt allowed to sit back in bed- pt placing self back in bed. Pt repositioned. Nurse Cely at bedside to assess pt.  Pt to benefit from HHPT     Rehab Prognosis: Fair; patient would benefit from acute skilled PT services to address these deficits and reach maximum level of function.    Recent Surgery: * No surgery found *      Plan:     During this hospitalization, patient to be seen 6 x/week to address the identified rehab impairments via gait training, therapeutic activities, therapeutic exercises and progress toward the following goals:    · Plan of Care Expires:  12/30/20    Subjective   Pt seen at bedside brushing teeth assisted by personal caregiver  Pt eager for OOB  Caregiver stated pt has HHPT and  Walks with hemiwalker household distance- is mostly wheelchair level  Chief Complaint: dizziness- pt wanted glasses on  Patient/Family Comments/goals: none stated  Pain/Comfort:  · Pain Rating 1: 0/10    Patients cultural, spiritual, Mormon conflicts given the current situation:      Living Environment:  Home with  caregivers  Daughter closeby  Prior to admission, patients level of function was assist for all mobility.  Equipment used at home: wheelchair, walker, nikita.  DME owned (not currently used): rolling walker.  Upon discharge, patient will have assistance from family/caregiver.    Objective:     Communicated with nurse Romero prior to session.  Patient found HOB elevated with peripheral IV, hip abduction pillow, PEG Tube, SCD, telemetry  upon PT entry to room.    General Precautions: Standard, fall   Orthopedic Precautions:N/A   Braces: N/A     Exams:  · RLE ROM: WFL  · RLE Strength: Deficits: 3-/5  · LLE ROM: WFL  · LLE Strength: WFL    Functional Mobility:  · Bed Mobility:     · Rolling Left:  minimum assistance  · Scooting: minimum assistance  · Supine to Sit: moderate assistance  · Sit to Supine: moderate assistance  · Transfers:     · Sit to Stand:  moderate assistance with hemiwalker    Therapeutic Activities and Exercises:   Patient was educated on the importance of OOB activity and functional mobility to negate negative effects of prolonged bed rest during hospitalization, safe transfers and ambulation, and D/C planning   EOB sitting, completed thera ex presents with R paresis  C/o dizziness, worse with standing /94 SAT 98% seated EOB  Pt returned supine- nurse Ta at bedside, CNA Allen    AM-PAC 6 CLICK MOBILITY  Total Score:15     Patient left HOB elevated with all lines intact, call button in reach, bed alarm on and nurse and CNA present.    GOALS:   Multidisciplinary Problems     Physical Therapy Goals        Problem: Physical Therapy Goal    Goal Priority Disciplines Outcome Goal Variances Interventions   Physical Therapy Goal     PT, PT/OT Ongoing, Progressing     Description: Goals to be met by: 2020     Patient will increase functional independence with mobility by performin. Supine to sit with MInimal Assistance  2. Sit to stand transfer with Minimal Assistance  3. Bed  to chair transfer with Minimal Assistance using Nishant-walker  4. Gait  x 50 feet with Moderate Assistance using Nishant-walker.   5. Lower extremity exercise program x20 reps                    History:     Past Medical History:   Diagnosis Date    NICK (acute kidney injury) 7/29/2016    Allergy     Anemia, mild 12/15/2014    Arthritis     Gout    Atrial fibrillation 03/2019    Benign essential HTN 3/27/2012    BMI 29.0-29.9,adult 5/10/2018    BPH (benign prostatic hyperplasia)     BPH (benign prostatic hyperplasia)     CAD (coronary artery disease) 2006    Carotid artery occlusion     60-70% FROYLAN, LICA < 50%    Chronic kidney disease     due to ibuprofen    Colon polyp     CRF (chronic renal failure), stage 5     Diabetes mellitus     Diverticulosis     ESRD (end stage renal disease) on dialysis     Gastritis     GERD (gastroesophageal reflux disease)     Gout     History of colon polyps 5/3/2018    HTN (hypertension) 3/27/2012    Hyperlipidemia     Hyperlipidemia     LLL pneumonia 6/14/2018    LVH (left ventricular hypertrophy)     Mesenteric ischemia     Murmur, cardiac 3/27/2012    GRICELDA (obstructive sleep apnea)     DOES NOT USE A MACHINE    Sinus problem     Stroke 03/2019    acute left PCA    Syncope and collapse        Past Surgical History:   Procedure Laterality Date    APPENDECTOMY      CARDIAC CATHETERIZATION  2012    LIMA to LAD patent, SVG to RCA    COLONOSCOPY  2011    COLONOSCOPY N/A 5/3/2018    Procedure: COLONOSCOPY;  Surgeon: Messi Harris MD;  Location: [facility];  Service: Endoscopy;  Laterality: N/A;    COLONOSCOPY N/A 8/18/2020    Procedure: COLONOSCOPY;  Surgeon: Messi Harris MD;  Location: [facility];  Service: Endoscopy;  Laterality: N/A;    CORONARY ARTERY BYPASS GRAFT  4/2007    x 2 [place]    ESOPHAGOGASTRODUODENOSCOPY N/A 8/17/2020    Procedure: EGD (ESOPHAGOGASTRODUODENOSCOPY);  Surgeon: Eben Soto MD;  Location: [facility];  Service:  Endoscopy;  Laterality: N/A;    ESOPHAGOGASTRODUODENOSCOPY N/A 11/5/2020    Procedure: EGD (ESOPHAGOGASTRODUODENOSCOPY)(PEG removal and Arturo placement;  Surgeon: Messi Harris MD;  Location[facility];  Service: Endoscopy;  Laterality: N/A;    INSERTION OR REPLACEMENT OF PERCUTANEOUS ENDOSCOPIC JEJUNOSTOMY TUBE N/A 11/5/2020    Procedure: INSERTION OR REPLACEMENT, JEJUNOSTOMY TUBE, PERCUTANEOUS, ENDOSCOPIC;  Surgeon: Messi Harris MD;  Location: [facility];  Service: Endoscopy;  Laterality: N/A;    JOINT REPLACEMENT      left knee total replacement  X 3    mid leftt finger      from a cactuss    MOLE REMOVAL  2016    rotative cuff      no rotative cuffs on bilat shoulders has pins     SHOULDER SURGERY      shoulder surgery bilat  RIGHT X 4; LEFT X 3       Time Tracking:     PT Received On: 11/19/20  PT Start Time: 0858     PT Stop Time: 0944  PT Total Time (min): 46 min     Billable Minutes: Evaluation 20 and Therapeutic Exercise 26      Malaika Cerrato, PT  11/19/2020

## 2020-11-19 NOTE — CONSULTS
[facility]  Neurology  Consult Note    Patient Name: Micheal Hunt  MRN: [id number]  Admission Date: 11/17/2020  Hospital Length of Stay: 0 days  Code Status: Full Code   Attending Provider: Charissa Olivas MD   Consulting Provider: Dr Ramirez  Primary Care Physician: Pk Lakhani MD  Principal Problem:Stroke    Consults  Subjective:     Chief Complaint:     Emesis       since this morning.         HPI per EMr: Micheal Hunt is an 80-year-old male with past medical history significant for stroke, dementia, coronary artery disease, atrial fibrillation, end-stage renal disease on dialysis, type 2 diabetes, and hypertension who presented to the emergency department with an onset of facial droop which began this afternoon and has since resolved.  Patient's caregiver is at bedside and states that patient was in his usual state of health yesterday but has been vomiting today.  Caregiver reports that patient had a PEG tube placed last week  due to aspiration.  They have only used it for medication administration and some supplemental nutrition at night.  Family has still allowed patient oral intake.  Reports subjective fever this morning and increased cough.  No apparent SOB or chest pain.  She states that the PEG tube has not been used more consistently because the patient complains of pain in that area.  Reports previous stroke which occurred about a year ago with residual right-sided weakness.  Patient is wheelchair-bound. While in the ED, pt underwent a tele-stroke evaluation and tPA was not recommended as pt's symptoms had resolved and pt is on Eliquis.  He will be placed in observation for further monitoring and treatment.    Neurological Consult: Pt seen and examined and POC discussed with Dr Ramirez. Pt is not oriented to place or date. He is oriented to self and that he is having dialysis. Pt follows commands. He has RS weakness s/t to old CVA. No facial droop or slurred speech at this  time.       Past Medical History:   Diagnosis Date    NICK (acute kidney injury) 7/29/2016    Allergy     Anemia, mild 12/15/2014    Arthritis     Gout    Atrial fibrillation 03/2019    Benign essential HTN 3/27/2012    BMI 29.0-29.9,adult 5/10/2018    BPH (benign prostatic hyperplasia)     BPH (benign prostatic hyperplasia)     CAD (coronary artery disease) 2006    Carotid artery occlusion     60-70% FROYLAN, LICA < 50%    Chronic kidney disease     due to ibuprofen    Colon polyp     CRF (chronic renal failure), stage 5     Diabetes mellitus     Diverticulosis     ESRD (end stage renal disease) on dialysis     Gastritis     GERD (gastroesophageal reflux disease)     Gout     History of colon polyps 5/3/2018    HTN (hypertension) 3/27/2012    Hyperlipidemia     Hyperlipidemia     LLL pneumonia 6/14/2018    LVH (left ventricular hypertrophy)     Mesenteric ischemia     Murmur, cardiac 3/27/2012    GRICELDA (obstructive sleep apnea)     DOES NOT USE A MACHINE    Sinus problem     Stroke 03/2019    acute left PCA    Syncope and collapse        Past Surgical History:   Procedure Laterality Date    APPENDECTOMY      CARDIAC CATHETERIZATION  2012    LIMA to LAD patent, SVG to RCA    COLONOSCOPY  2011    COLONOSCOPY N/A 5/3/2018    Procedure: COLONOSCOPY;  Surgeon: Messi Harris MD;  Location: [facility];  Service: Endoscopy;  Laterality: N/A;    COLONOSCOPY N/A 8/18/2020    Procedure: COLONOSCOPY;  Surgeon: Messi Harris MD;  Location: [facility] ENDO;  Service: Endoscopy;  Laterality: N/A;    CORONARY ARTERY BYPASS GRAFT  4/2007    x 2 [place]    ESOPHAGOGASTRODUODENOSCOPY N/A 8/17/2020    Procedure: EGD (ESOPHAGOGASTRODUODENOSCOPY);  Surgeon: Eben Soto MD;  Location: [facility] ENDO;  Service: Endoscopy;  Laterality: N/A;    ESOPHAGOGASTRODUODENOSCOPY N/A 11/5/2020    Procedure: EGD (ESOPHAGOGASTRODUODENOSCOPY)(PEG removal and Arturo placement;  Surgeon: Messi Harris MD;   Location: [facility] ENDO;  Service: Endoscopy;  Laterality: N/A;    INSERTION OR REPLACEMENT OF PERCUTANEOUS ENDOSCOPIC JEJUNOSTOMY TUBE N/A 11/5/2020    Procedure: INSERTION OR REPLACEMENT, JEJUNOSTOMY TUBE, PERCUTANEOUS, ENDOSCOPIC;  Surgeon: Messi Harris MD;  Location: [facility];  Service: Endoscopy;  Laterality: N/A;    JOINT REPLACEMENT      left knee total replacement  X 3    mid leftt finger      from a cactuss    MOLE REMOVAL  2016    rotative cuff      no rotative cuffs on bilat shoulders has pins     SHOULDER SURGERY      shoulder surgery bilat  RIGHT X 4; LEFT X 3       Review of patient's allergies indicates:   Allergen Reactions    Ace inhibitors Other (See Comments)     Cough    Angiotensin ii     Arb-angiotensin receptor antagonist Itching    Eplerenone Other (See Comments)     Marked bradycardia, 40, tiredness and weakness      Gabapentin Hallucinations    Sulfa (sulfonamide antibiotics) Itching and Swelling     Patient says this was 10 years ago and doesn't remember what happened       Current Neurological Medications: eliquis    No current facility-administered medications on file prior to encounter.      Current Outpatient Medications on File Prior to Encounter   Medication Sig    allopurinoL (ZYLOPRIM) 100 MG tablet 100 mg by FEEDING TUBE route once daily.    apixaban (ELIQUIS) 2.5 mg Tab 2.5 mg by FEEDING TUBE route 2 (two) times daily.    aspirin (ECOTRIN) 81 MG EC tablet 81 mg by FEEDING TUBE route once daily.    atorvastatin (LIPITOR) 40 MG tablet 1 tablet (40 mg total) by Per G Tube route once daily.    carvediloL (COREG) 12.5 MG tablet Take 6.5 tablets (81.25 mg total) by mouth 2 (two) times daily. (Patient taking differently: 12.5 mg by Per G Tube route once daily. )    levETIRAcetam (KEPPRA) 100 mg/mL Soln 7.5 ML BY PEG DAILY    nut.tx.imp.renal fxn,lac-reduc (NEPRO CARB STEADY ORAL) 240 mLs by FEEDING TUBE route 2 (two) times daily. 6 hours apart AM and afternoon     nut.tx.imp.renal fxn,lac-reduc (NEPRO CARB STEADY ORAL) 1,000 mLs by FEEDING TUBE route every evening.     senna-docusate 8.6-50 mg (PERICOLACE) 8.6-50 mg per tablet 1 tablet by Per G Tube route once daily.    sertraline (ZOLOFT) 25 MG tablet Take 1 tablet (25 mg total) by mouth every evening.    vit b cmplx 3-fa-vit c-biotin 1- mg-mg-mcg (NEPHRO-EMMETT RX) 1- mg-mg-mcg Tab Take 1 tablet by mouth once daily. (Patient taking differently: 1 tablet by Per G Tube route once daily. )    [DISCONTINUED] levETIRAcetam (KEPPRA) 100 mg/mL Soln 7.5 ml by PEG daily (Patient taking differently: 750 mg by Per G Tube route once daily. 7.5 ml by PEG daily)      Family History     Problem Relation (Age of Onset)    Cancer Father    Heart disease Mother, Sister    Hypertension Brother    Pneumonia Sister    Stroke Sister    Sudden death Father        Tobacco Use    Smoking status: Never Smoker    Smokeless tobacco: Never Used   Substance and Sexual Activity    Alcohol use: No     Frequency: Never     Drinks per session: Patient refused     Binge frequency: Patient refused    Drug use: No    Sexual activity: Not Currently     Review of Systems   Unable to perform ROS: Dementia     Objective:     Vital Signs (Most Recent):  Temp: 98 °F (36.7 °C) (11/18/20 1330)  Pulse: 76 (11/18/20 1330)  Resp: 18 (11/18/20 1330)  BP: (!) 172/87 (11/18/20 1330)  SpO2: 96 % (11/18/20 0756) Vital Signs (24h Range):  Temp:  [97 °F (36.1 °C)-98.8 °F (37.1 °C)] 98 °F (36.7 °C)  Pulse:  [57-82] 76  Resp:  [16-19] 18  SpO2:  [93 %-100 %] 96 %  BP: (116-220)/() 172/87     Weight: 86.2 kg (190 lb 0.6 oz)  Body mass index is 25.07 kg/m².    Physical Exam  HENT:      Head: Normocephalic.      Mouth/Throat:      Mouth: Mucous membranes are moist.   Eyes:      Extraocular Movements: Extraocular movements intact.      Pupils: Pupils are equal, round, and reactive to light.   Neck:      Musculoskeletal: Normal range of motion.    Cardiovascular:      Rate and Rhythm: Normal rate.   Pulmonary:      Effort: Pulmonary effort is normal.   Musculoskeletal: Normal range of motion.      Comments: RS weakness   Skin:     General: Skin is warm.      Capillary Refill: Capillary refill takes less than 2 seconds.   Neurological:      Mental Status: He is alert. He is disoriented.      Motor: Weakness present.      Coordination: Finger-Nose-Finger Test normal.   Psychiatric:         Mood and Affect: Mood normal.         Speech: Speech normal.         NEUROLOGICAL EXAMINATION:     MENTAL STATUS   Follows 1 step commands.   Attention: normal. Concentration: normal.   Speech: speech is normal   Level of consciousness: alert  Able to name object. Able to repeat.     CRANIAL NERVES     CN II   Visual fields full to confrontation.     CN III, IV, VI   Pupils are equal, round, and reactive to light.    CN V   Facial sensation intact.     CN VII   Facial expression full, symmetric.     CN VIII   Hearing: intact    CN XII   CN XII normal.     MOTOR EXAM        LS 5/5  RS 2/5     SENSORY EXAM   Light touch normal.     GAIT AND COORDINATION      Coordination   Finger to nose coordination: normal    Tremor   Resting tremor: absent       jovanna   NIH Stroke Scale:    Level of Consciousness: 0 - alert  LOC Questions: 2 - answers none correctly  LOC Commands: 0 - performs both correctly  Best Gaze: 0 - normal  Visual: 0 - no visual loss  Facial Palsy: 0 - normal  Motor Left Arm: 0 - no drift  Motor Right Arm: 3 - no effort against gravity  Motor Left Le - no drift  Motor Right Le - no drift  Limb Ataxia: 0 - absent  Sensory: 0 - normal  Best Language: 0 - no aphasia  Dysarthria: 0 - normal articulation  Extinction and Inattention: 0 - no neglect  NIH Stroke Scale Total: 5          Significant Labs:   Lab Results   Component Value Date    WBC 7.37 2020    HGB 9.4 (L) 2020    HCT 28.7 (L) 2020    MCV 99 (H) 2020     (L) 2020        CMP  Sodium   Date Value Ref Range Status   11/18/2020 139 136 - 145 mmol/L Final     Potassium   Date Value Ref Range Status   11/18/2020 4.2 3.5 - 5.1 mmol/L Final     Chloride   Date Value Ref Range Status   11/18/2020 105 95 - 110 mmol/L Final     CO2   Date Value Ref Range Status   11/18/2020 23 23 - 29 mmol/L Final     Glucose   Date Value Ref Range Status   11/18/2020 96 70 - 110 mg/dL Final     BUN   Date Value Ref Range Status   11/18/2020 30 (H) 8 - 23 mg/dL Final     Creatinine   Date Value Ref Range Status   11/18/2020 6.3 (H) 0.5 - 1.4 mg/dL Final   08/08/2013 1.5 (H) 0.5 - 1.4 mg/dL Final     Calcium   Date Value Ref Range Status   11/18/2020 8.5 (L) 8.7 - 10.5 mg/dL Final   08/08/2013 9.0 8.7 - 10.5 mg/dL Final     Total Protein   Date Value Ref Range Status   11/18/2020 6.6 6.0 - 8.4 g/dL Final     Albumin   Date Value Ref Range Status   11/18/2020 3.5 3.5 - 5.2 g/dL Final   09/12/2013 4.3 3.6 - 5.1 g/dL Final     Comment:     @ Test Performed By:  [facility] Wilfredo Baldwin M.D., JACK,   [address] [place] [id number]  [facility] #[id number]     Total Bilirubin   Date Value Ref Range Status   11/18/2020 0.4 0.1 - 1.0 mg/dL Final     Comment:     For infants and newborns, interpretation of results should be based  on gestational age, weight and in agreement with clinical  observations.  Premature Infant recommended reference ranges:  Up to 24 hours.............<8.0 mg/dL  Up to 48 hours............<12.0 mg/dL  3-5 days..................<15.0 mg/dL  6-29 days.................<15.0 mg/dL       Alkaline Phosphatase   Date Value Ref Range Status   11/18/2020 69 55 - 135 U/L Final     AST   Date Value Ref Range Status   11/18/2020 10 10 - 40 U/L Final     ALT   Date Value Ref Range Status   11/18/2020 10 10 - 44 U/L Final     Anion Gap   Date Value Ref Range Status   11/18/2020 11 8 - 16 mmol/L Final   08/08/2013 11 5 - 15 meq/L Final     eGFR if     Date Value Ref Range Status   11/18/2020 9 (A) >60 mL/min/1.73 m^2 Final     eGFR if non    Date Value Ref Range Status   11/18/2020 8 (A) >60 mL/min/1.73 m^2 Final     Comment:     Calculation used to obtain the estimated glomerular filtration  rate (eGFR) is the CKD-EPI equation.            Significant Imaging: MRA Neck without contrast  Narrative: EXAMINATION:  MRA NECK WITHOUT CONTRAST    CLINICAL HISTORY:  Stroke, follow up;    TECHNIQUE:  Time of flight MRA of the carotid and vertebral arteries was performed with 3D reconstructions according to the standard neck MRA protocol.    COMPARISON:  CTA head neck 03/24/2019    FINDINGS:  Moderate to severe luminal narrowing of the bilateral proximal internal carotid arteries secondary to known significant calcific atherosclerotic plaque, better visualized on the prior CTA neck dated 03/24/2019.    The right vertebral artery is dominant and grossly patent from its origin to the skull base.  The left vertebral artery is diminutive in caliber and demonstrates minimal flow related signal within its proximal aspect in this patient with known intermittent occlusion along its V2/V3 segments better seen on the prior CTA.  Impression: 1. Chronic moderate to severe luminal narrowing stenoses of the proximal bilateral internal carotid arteries.  2. Diminutive left vertebral artery with known intermittent high-grade stenoses/occlusions along its V2 and V3 segments, better characterized on the prior CTA dated 03/24/2019.    Electronically signed by: Abhay Nails  Date:    11/18/2020  Time:    16:02  MRA Brain without contrast  Narrative: EXAMINATION:  MRA BRAIN WITHOUT CONTRAST    CLINICAL HISTORY:  Stroke, follow up; .    TECHNIQUE:  Non-contrast 3-D time-of-flight intracranial MR angiography was performed through the [place] [facility] [place] with MIP reformatting.    COMPARISON:  Concomitant MRI, MRI/MRA 03/24/2019, CTA head neck  03/24/2019    FINDINGS:  Luminal regularity and mild-to-moderate narrowing of the bilateral cavernous and supraclinoid segments of internal carotid arteries secondary to marked calcific atherosclerotic disease seen on the prior CTA head and neck dated 03/24/2019.  Mild narrowing of bilateral proximal M1 segments of the middle cerebral arteries.  Mild narrowing of A2 segments of bilateral anterior cerebral arteries.  The right vertebral artery is dominant.  The left vertebral artery is diminutive in caliber with decreased signal in keeping with the patient's known high-grade stenosis/occlusion better visualized on the prior CTA dated 03/24/2019.  There is significant signal loss involving the proximal P2 segment of the left posterior cerebral artery with diminished signal distally, corresponding to area of significant calcific plaque on the prior CTA.  The right posterior cerebral artery is patent.  The basilar artery is normal.  There is no aneurysm or vascular malformation identified.  Although MRA is a screening examination, catheter angiography remains the definitive study for small aneurysms, vasculitis, and other vascular abnormalities.  Impression: 1. Marked intracranial atherosclerotic disease with chronic mild-to-moderate luminal stenosis of the bilateral cavernous/supraclinoid segments of the internal carotid arteries, not significantly changed in comparison to the prior study on 03/24/2019.  2. 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  3. Diminutive left vertebral artery with abnormal flow in this patient with known high-grade stenosis/occlusion.    Electronically signed by: Abhay Nails  Date:    11/18/2020  Time:    15:51  MRI Brain Without Contrast  Narrative: EXAMINATION:  MRI BRAIN WITHOUT CONTRAST    CLINICAL HISTORY:  Stroke, follow up;.    TECHNIQUE:  Multiplanar multisequence MR imaging of the brain was performed without the administration of intravenous  contrast.    COMPARISON:  CT head 11/17/2020, MRI brain [id number]    FINDINGS:  Prominence of the ventricles and sulci compatible with moderate generalized cerebral volume loss.  No hydrocephalus. No extra-axial blood or fluid collections.    Redemonstration of a chronic left posterior cerebral artery territory infarct involving the left thalamus, paramedian left posterior temporal and parietal lobes and splenium of the corpus callosum, with associated encephalomalacia and gyral T1 hyperintensity compatible with cortical laminar necrosis.  Small bilateral cerebellar hemisphere chronic infarcts.  Stable ex vacuo dilatation of the atrium and occipital horn of the left lateral ventricle.  Confluent and scattered areas of T2/FLAIR hyperintense signal involving the periventricular and deep cerebral white matter, which are nonspecific but probably represent a significant degree of chronic microvascular ischemic change.  Diffusion-weighted images demonstrate no evidence of an acute infarct.   Susceptibility weighted images demonstrate no evidence of acute or chronic hemorrhage.    Normal vascular flow voids are preserved.    Bone marrow signal intensity is normal. Paranasal sinuses and mastoid air cells are essentially clear. Orbits are unremarkable.  Impression: 1. No acute intracranial abnormality.  2. Moderate generalized cerebral volume loss with significant chronic microvascular ischemic change.  Stable remote left posterior cerebral artery territory infarct with encephalomalacia and cortical laminar necrosis.    Electronically signed by: Abhay Nails  Date:    11/18/2020  Time:    15:34  Echo Color Flow Doppler? Yes  · There is left ventricular concentric hypertrophy.  · The estimated PA systolic pressure is 35 mmHg.  · Moderate left atrial enlargement.  · The left ventricle is mildly enlargedThe estimated ejection fraction is   34%.  · There is mild left ventricular global hypokinesis.  · Atrial fibrillation observed  with restrictive filling pattern present.  · With normal left atrial pressure.  · Normal right ventricular size with normal right ventricular systolic   function.  · Severe aortic regurgitation.  · Moderate-to-severe aortic valve stenosis.  · Aortic valve area is 1.04 cm2; peak velocity is 3.38 m/s; mean gradient   is 26 mmHg.  · Mild mitral regurgitation.  · There is mild pulmonary hypertension.  · Mild to moderate tricuspid regurgitation.  · Normal central venous pressure (3 mmHg).     Patient has left ventricle hypertrophy depressed ejection fraction, atrial   fibrillation, mild pulmonary hypertension  Moderately severe aortic valvular stenosis and moderately severe to severe   aortic regurgitation recorded  Bubble study was negative for PFO        Assessment and Plan:  PMH includes: afib, PFO, CVA, DB2, ESRD, CAD, epilepsy, dementia    Emesis/Facial Droop (Left) Resolved  -Clinical TIA  -CT head: old CVA left PCA territory  -MRI brain: negative acute  -MRA brain: marked atherosclerotic disease with mild to moderate stenosis, details above  -ECHO: Afib, negative for shunt, Mod LAE  -Lipids: 83 chol 46 ldl  -A1c: 5.1  -stroke prevention: eliquis 2.5 mg BID, statin 40 mg daily    H/O Epilepsy  -Keppra 750 ml daily  -Last EEG march 2020 [place]  -AED level ordered      PLAN: No stroke on MRI brain. No bleed on CT head. Continue eliquis at this time.        Patient to follow up with [facility] at [phone] within 3 days from discharge.     Stroke education was provided including stroke risk factors modification and any acute neurological changes including weakness, confusion, visual changes to come straight to the ER.     All questions were answered.                                  Active Diagnoses:    Diagnosis Date Noted POA    PRINCIPAL PROBLEM:  Stroke [I63.9] 11/17/2020 Yes    PEG (percutaneous endoscopic gastrostomy) status [Z93.1] 08/16/2020 Not Applicable    ESRD (end stage renal  disease) [N18.6] 03/12/2019 Yes    Persistent atrial fibrillation [I48.19] 03/12/2019 Yes    Anemia of chronic disease [D63.8] 12/15/2014 Yes    GERD (gastroesophageal reflux disease) [K21.9] 06/11/2013 Yes    CAD (coronary artery disease), 2V CABG 2007 [I25.10]  Yes    Essential hypertension [I10] 03/27/2012 Yes    Hyperlipidemia [E78.5] 03/27/2012 Yes    UTI (urinary tract infection) [N39.0] 02/16/2012 Yes      Problems Resolved During this Admission:       VTE Risk Mitigation (From admission, onward)         Ordered     IP VTE HIGH RISK PATIENT  Once      11/17/20 2247     Place sequential compression device  Until discontinued      11/17/20 2247     Reason for No Pharmacological VTE Prophylaxis  Once     Question:  Reasons:  Answer:  Already adequately anticoagulated on oral Anticoagulants    11/17/20 2247                Thank you for your consult. I will follow-up with patient. Please contact us if you have any additional questions.    Rebeka Corley NP  Neurology  [facility]

## 2020-11-19 NOTE — NURSING
Attempted In and out cath- unable to obtain any urine return. During In/Out cath insertion pt expressed 10/10 pain. Prior to insertion small amount of dried bleeding noted in diaper blood with a few small dark clots noted. Prior to insertion small amount of bright red drainage per tip of penis. Assisted by another nurse to verify cathter insertion without return. Updated Hospitalist that urine return was unsuccessful during IN/Out cath and noted to be painful, but pt is a chronic hemodialysis pt. She ordered to  Bladder scan pt.   Per bladder scan the highest urine residual noted was 40ml. Pt denies any complaints currently. Pt's visitor at bedside request hospitalist come to room due to the family having concerns with pt discharging today.    1500- Hospitalist spoke with family at pt's bedside. Notified that discharge is cancelled today and will reassess tomorrow.

## 2020-11-19 NOTE — ASSESSMENT & PLAN NOTE
Follow Neurology recommendations. Neuro consult appreciated.   Neuro checks q4h  Permissive HTN <220/<120 - will hold home antihypertensives and restart when clinically appropriate  MRI of brain completed 11/17/2020 and no acute  MRA head and neck completed 11/17/2020 no changed since study 3/24/2019  ECHO -completed 11/18/2020 Patient has left ventricle hypertrophy depressed ejection fraction, atrial fibrillation, mild pulmonary hypertension Moderately severe aortic valvular stenosis and moderately severe to severe aortic regurgitation recorded  Bubble study was negative for PFO     Trend CBC, CMP, Mg and Phos  Fasting lipid panel  HgA1C  Consult PT/OT/ST  Continue aspirin 81 mg daily.  Hold chronic Eliquis until OK with neurology to resume.  DVT prophylaxis with LOYD's, SCD's.    Aspiration precautions, fall precautions

## 2020-11-19 NOTE — ASSESSMENT & PLAN NOTE
Creatine stable for now. BMP reviewed- noted Estimated Creatinine Clearance: 12.6 mL/min (A) (based on SCr of 5.2 mg/dL (H)). according to latest data. Monitor UOP and serial BMP and adjust therapy as needed. Renally dose meds.  Chronic dialysis MWF, continue hemodialysis therapy as per Nephrology team.      11/19/2020  Cont current tx plan

## 2020-11-19 NOTE — PROGRESS NOTES
[facility]  Neurology  Progress Note    Patient Name: Micheal Hunt  MRN: [id number]  Admission Date: 11/17/2020  Hospital Length of Stay: 0 days  Code Status: Full Code   Attending Provider: Carlee Reeves MD   Consulting Provider: Dr Ramirez  Primary Care Physician: Pk Lakhani MD  Principal Problem:Stroke    Consults  Subjective:     Chief Complaint:     Emesis       since this morning.         HPI per EMr: Micheal Hunt is an 80-year-old male with past medical history significant for stroke, dementia, coronary artery disease, atrial fibrillation, end-stage renal disease on dialysis, type 2 diabetes, and hypertension who presented to the emergency department with an onset of facial droop which began this afternoon and has since resolved.  Patient's caregiver is at bedside and states that patient was in his usual state of health yesterday but has been vomiting today.  Caregiver reports that patient had a PEG tube placed last week  due to aspiration.  They have only used it for medication administration and some supplemental nutrition at night.  Family has still allowed patient oral intake.  Reports subjective fever this morning and increased cough.  No apparent SOB or chest pain.  She states that the PEG tube has not been used more consistently because the patient complains of pain in that area.  Reports previous stroke which occurred about a year ago with residual right-sided weakness.  Patient is wheelchair-bound. While in the ED, pt underwent a tele-stroke evaluation and tPA was not recommended as pt's symptoms had resolved and pt is on Eliquis.  He will be placed in observation for further monitoring and treatment.    Neurological Consult: Pt seen and examined and POC discussed with Dr Ramirez. Pt is not oriented to place or date. He is oriented to self and that he is having dialysis. Pt follows commands. He has RS weakness s/t to old CVA. No facial droop or slurred speech  at this time.       11/19:  Patient seen and examined with Dr. Ramirez.  Patient's caregiver at bedside and patient's daughter on phone.  Patient is oriented to self he thinks he is in [place].  He does follow commands.  He still has right-sided weakness and decreased sensation from his original stroke.  Daughter and caretaker believes that his speech and mentation is at baseline and he is frequently disoriented.    Past Medical History:   Diagnosis Date    NICK (acute kidney injury) 7/29/2016    Allergy     Anemia, mild 12/15/2014    Arthritis     Gout    Atrial fibrillation 03/2019    Benign essential HTN 3/27/2012    BMI 29.0-29.9,adult 5/10/2018    BPH (benign prostatic hyperplasia)     BPH (benign prostatic hyperplasia)     CAD (coronary artery disease) 2006    Carotid artery occlusion     60-70% FROYLAN, LICA < 50%    Chronic kidney disease     due to ibuprofen    Colon polyp     CRF (chronic renal failure), stage 5     Diabetes mellitus     Diverticulosis     ESRD (end stage renal disease) on dialysis     Gastritis     GERD (gastroesophageal reflux disease)     Gout     History of colon polyps 5/3/2018    HTN (hypertension) 3/27/2012    Hyperlipidemia     Hyperlipidemia     LLL pneumonia 6/14/2018    LVH (left ventricular hypertrophy)     Mesenteric ischemia     Murmur, cardiac 3/27/2012    GRICELDA (obstructive sleep apnea)     DOES NOT USE A MACHINE    Sinus problem     Stroke 03/2019    acute left PCA    Syncope and collapse        Past Surgical History:   Procedure Laterality Date    APPENDECTOMY      CARDIAC CATHETERIZATION  2012    LIMA to LAD patent, SVG to RCA    COLONOSCOPY  2011    COLONOSCOPY N/A 5/3/2018    Procedure: COLONOSCOPY;  Surgeon: Messi Harris MD;  Location: [facility] ENDO;  Service: Endoscopy;  Laterality: N/A;    COLONOSCOPY N/A 8/18/2020    Procedure: COLONOSCOPY;  Surgeon: Messi Harris MD;  Location: [facility] ENDO;  Service: Endoscopy;  Laterality: N/A;     CORONARY ARTERY BYPASS GRAFT  4/2007    x 2 [place]    ESOPHAGOGASTRODUODENOSCOPY N/A 8/17/2020    Procedure: EGD (ESOPHAGOGASTRODUODENOSCOPY);  Surgeon: Eben Soto MD;  Location: [facility] ENDO;  Service: Endoscopy;  Laterality: N/A;    ESOPHAGOGASTRODUODENOSCOPY N/A 11/5/2020    Procedure: EGD (ESOPHAGOGASTRODUODENOSCOPY)(PEG removal and Arturo placement;  Surgeon: Messi Harris MD;  Location: [facility] ENDO;  Service: Endoscopy;  Laterality: N/A;    INSERTION OR REPLACEMENT OF PERCUTANEOUS ENDOSCOPIC JEJUNOSTOMY TUBE N/A 11/5/2020    Procedure: INSERTION OR REPLACEMENT, JEJUNOSTOMY TUBE, PERCUTANEOUS, ENDOSCOPIC;  Surgeon: Messi Harris MD;  Location: [facility] ENDO;  Service: Endoscopy;  Laterality: N/A;    JOINT REPLACEMENT      left knee total replacement  X 3    mid leftt finger      from a cactuss    MOLE REMOVAL  2016    rotative cuff      no rotative cuffs on bilat shoulders has pins     SHOULDER SURGERY      shoulder surgery bilat  RIGHT X 4; LEFT X 3       Review of patient's allergies indicates:   Allergen Reactions    Ace inhibitors Other (See Comments)     Cough    Angiotensin ii     Arb-angiotensin receptor antagonist Itching    Eplerenone Other (See Comments)     Marked bradycardia, 40, tiredness and weakness      Gabapentin Hallucinations    Sulfa (sulfonamide antibiotics) Itching and Swelling     Patient says this was 10 years ago and doesn't remember what happened       Current Neurological Medications: eliquis    No current facility-administered medications on file prior to encounter.      Current Outpatient Medications on File Prior to Encounter   Medication Sig    allopurinoL (ZYLOPRIM) 100 MG tablet 100 mg by FEEDING TUBE route once daily.    apixaban (ELIQUIS) 2.5 mg Tab 2.5 mg by FEEDING TUBE route 2 (two) times daily.    aspirin (ECOTRIN) 81 MG EC tablet 81 mg by FEEDING TUBE route once daily.    atorvastatin (LIPITOR) 40 MG tablet 1 tablet (40 mg total) by Per G  Tube route once daily.    carvediloL (COREG) 12.5 MG tablet Take 6.5 tablets (81.25 mg total) by mouth 2 (two) times daily. (Patient taking differently: 12.5 mg by Per G Tube route once daily. )    levETIRAcetam (KEPPRA) 100 mg/mL Soln 7.5 ML BY PEG DAILY    nut.tx.imp.renal fxn,lac-reduc (NEPRO CARB STEADY ORAL) 240 mLs by FEEDING TUBE route 2 (two) times daily. 6 hours apart AM and afternoon    nut.tx.imp.renal fxn,lac-reduc (NEPRO CARB STEADY ORAL) 1,000 mLs by FEEDING TUBE route every evening.     senna-docusate 8.6-50 mg (PERICOLACE) 8.6-50 mg per tablet 1 tablet by Per G Tube route once daily.    sertraline (ZOLOFT) 25 MG tablet Take 1 tablet (25 mg total) by mouth every evening.    vit b cmplx 3-fa-vit c-biotin 1- mg-mg-mcg (NEPHRO-EMMETT RX) 1- mg-mg-mcg Tab Take 1 tablet by mouth once daily. (Patient taking differently: 1 tablet by Per G Tube route once daily. )    [DISCONTINUED] levETIRAcetam (KEPPRA) 100 mg/mL Soln 7.5 ml by PEG daily (Patient taking differently: 750 mg by Per G Tube route once daily. 7.5 ml by PEG daily)      Family History     Problem Relation (Age of Onset)    Cancer Father    Heart disease Mother, Sister    Hypertension Brother    Pneumonia Sister    Stroke Sister    Sudden death Father        Tobacco Use    Smoking status: Never Smoker    Smokeless tobacco: Never Used   Substance and Sexual Activity    Alcohol use: No     Frequency: Never     Drinks per session: Patient refused     Binge frequency: Patient refused    Drug use: No    Sexual activity: Not Currently     Review of Systems   Unable to perform ROS: Dementia     Objective:     Vital Signs (Most Recent):  Temp: 97 °F (36.1 °C) (11/19/20 1130)  Pulse: 80 (11/19/20 1130)  Resp: 17 (11/19/20 1130)  BP: (!) 142/77 (11/19/20 1130)  SpO2: 96 % (11/19/20 1130) Vital Signs (24h Range):  Temp:  [97 °F (36.1 °C)-98.4 °F (36.9 °C)] 97 °F (36.1 °C)  Pulse:  [69-82] 80  Resp:  [16-18] 17  SpO2:  [96 %-99 %] 96  %  BP: (121-149)/(58-78) 142/77     Weight: 86.2 kg (190 lb 0.6 oz)  Body mass index is 25.07 kg/m².    Physical Exam  HENT:      Head: Normocephalic.      Mouth/Throat:      Mouth: Mucous membranes are moist.   Eyes:      Extraocular Movements: Extraocular movements intact.      Pupils: Pupils are equal, round, and reactive to light.   Neck:      Musculoskeletal: Normal range of motion.   Cardiovascular:      Rate and Rhythm: Normal rate.   Pulmonary:      Effort: Pulmonary effort is normal.   Musculoskeletal: Normal range of motion.      Comments: RS weakness   Skin:     General: Skin is warm.      Capillary Refill: Capillary refill takes less than 2 seconds.   Neurological:      Mental Status: He is alert. He is disoriented.      Motor: Weakness present.      Coordination: Finger-Nose-Finger Test normal.   Psychiatric:         Mood and Affect: Mood normal.         Speech: Speech normal.         NEUROLOGICAL EXAMINATION:     MENTAL STATUS   Follows 1 step commands.   Attention: normal. Concentration: normal.   Speech: speech is normal   Level of consciousness: alert  Able to name object. Able to repeat.     CRANIAL NERVES     CN II   Visual fields full to confrontation.     CN III, IV, VI   Pupils are equal, round, and reactive to light.    CN V   Facial sensation intact.     CN VII   Facial expression full, symmetric.     CN VIII   Hearing: intact    CN XII   CN XII normal.     MOTOR EXAM        LS 5/5  RS 2/5     SENSORY EXAM   Light touch normal.     GAIT AND COORDINATION      Coordination   Finger to nose coordination: normal    Tremor   Resting tremor: absent       jovanna   NIH Stroke Scale:    Level of Consciousness: 0 - alert  LOC Questions: 2 - answers none correctly  LOC Commands: 0 - performs both correctly  Best Gaze: 0 - normal  Visual: 0 - no visual loss  Facial Palsy: 1 - minor  Motor Left Arm: 0 - no drift  Motor Right Arm: 3 - no effort against gravity  Motor Left Le - no drift  Motor Right Leg: 3  - no effort against gravity  Limb Ataxia: 0 - absent  Sensory: 0 - normal  Best Language: 0 - no aphasia  Dysarthria: 1 - mild to moderate dysarthria  Extinction and Inattention: 0 - no neglect  NIH Stroke Scale Total: 10          Significant Labs:   Lab Results   Component Value Date    WBC 8.53 11/19/2020    HGB 10.7 (L) 11/19/2020    HCT 32.6 (L) 11/19/2020    MCV 98 11/19/2020     11/19/2020       CMP  Sodium   Date Value Ref Range Status   11/19/2020 142 136 - 145 mmol/L Final     Potassium   Date Value Ref Range Status   11/19/2020 4.1 3.5 - 5.1 mmol/L Final     Chloride   Date Value Ref Range Status   11/19/2020 105 95 - 110 mmol/L Final     CO2   Date Value Ref Range Status   11/19/2020 24 23 - 29 mmol/L Final     Glucose   Date Value Ref Range Status   11/19/2020 82 70 - 110 mg/dL Final     BUN   Date Value Ref Range Status   11/19/2020 23 8 - 23 mg/dL Final     Creatinine   Date Value Ref Range Status   11/19/2020 5.2 (H) 0.5 - 1.4 mg/dL Final   08/08/2013 1.5 (H) 0.5 - 1.4 mg/dL Final     Calcium   Date Value Ref Range Status   11/19/2020 9.1 8.7 - 10.5 mg/dL Final   08/08/2013 9.0 8.7 - 10.5 mg/dL Final     Total Protein   Date Value Ref Range Status   11/19/2020 7.2 6.0 - 8.4 g/dL Final     Albumin   Date Value Ref Range Status   11/19/2020 3.8 3.5 - 5.2 g/dL Final   09/12/2013 4.3 3.6 - 5.1 g/dL Final     Comment:     @ Test Performed By:  KDS PaulaJACK Langley M.D.,   [address] [place] [address]  [facility] #[id number]     Total Bilirubin   Date Value Ref Range Status   11/19/2020 0.5 0.1 - 1.0 mg/dL Final     Comment:     For infants and newborns, interpretation of results should be based  on gestational age, weight and in agreement with clinical  observations.  Premature Infant recommended reference ranges:  Up to 24 hours.............<8.0 mg/dL  Up to 48 hours............<12.0 mg/dL  3-5 days..................<15.0 mg/dL  6-29  days.................<15.0 mg/dL       Alkaline Phosphatase   Date Value Ref Range Status   11/19/2020 79 55 - 135 U/L Final     AST   Date Value Ref Range Status   11/19/2020 10 10 - 40 U/L Final     ALT   Date Value Ref Range Status   11/19/2020 11 10 - 44 U/L Final     Anion Gap   Date Value Ref Range Status   11/19/2020 13 8 - 16 mmol/L Final   08/08/2013 11 5 - 15 meq/L Final     eGFR if    Date Value Ref Range Status   11/19/2020 11 (A) >60 mL/min/1.73 m^2 Final     eGFR if non    Date Value Ref Range Status   11/19/2020 10 (A) >60 mL/min/1.73 m^2 Final     Comment:     Calculation used to obtain the estimated glomerular filtration  rate (eGFR) is the CKD-EPI equation.            Significant Imaging: MRA Neck without contrast  Narrative: EXAMINATION:  MRA NECK WITHOUT CONTRAST    CLINICAL HISTORY:  Stroke, follow up;    TECHNIQUE:  Time of flight MRA of the carotid and vertebral arteries was performed with 3D reconstructions according to the standard neck MRA protocol.    COMPARISON:  CTA head neck 03/24/2019    FINDINGS:  Moderate to severe luminal narrowing of the bilateral proximal internal carotid arteries secondary to known significant calcific atherosclerotic plaque, better visualized on the prior CTA neck dated 03/24/2019.    The right vertebral artery is dominant and grossly patent from its origin to the skull base.  The left vertebral artery is diminutive in caliber and demonstrates minimal flow related signal within its proximal aspect in this patient with known intermittent occlusion along its V2/V3 segments better seen on the prior CTA.  Impression: 1. Chronic moderate to severe luminal narrowing stenoses of the proximal bilateral internal carotid arteries.  2. Diminutive left vertebral artery with known intermittent high-grade stenoses/occlusions along its V2 and V3 segments, better characterized on the prior CTA dated 03/24/2019.    Electronically signed by: Abhay  Jesu  Date:    11/18/2020  Time:    16:02  MRA Brain without contrast  Narrative: EXAMINATION:  MRA BRAIN WITHOUT CONTRAST    CLINICAL HISTORY:  Stroke, follow up; .    TECHNIQUE:  Non-contrast 3-D time-of-flight intracranial MR angiography was performed through the [place] [facility] [place] with MIP reformatting.    COMPARISON:  Concomitant MRI, MRI/MRA 03/24/2019, CTA head neck 03/24/2019    FINDINGS:  Luminal regularity and mild-to-moderate narrowing of the bilateral cavernous and supraclinoid segments of internal carotid arteries secondary to marked calcific atherosclerotic disease seen on the prior CTA head and neck dated 03/24/2019.  Mild narrowing of bilateral proximal M1 segments of the middle cerebral arteries.  Mild narrowing of A2 segments of bilateral anterior cerebral arteries.  The right vertebral artery is dominant.  The left vertebral artery is diminutive in caliber with decreased signal in keeping with the patient's known high-grade stenosis/occlusion better visualized on the prior CTA dated 03/24/2019.  There is significant signal loss involving the proximal P2 segment of the left posterior cerebral artery with diminished signal distally, corresponding to area of significant calcific plaque on the prior CTA.  The right posterior cerebral artery is patent.  The basilar artery is normal.  There is no aneurysm or vascular malformation identified.  Although MRA is a screening examination, catheter angiography remains the definitive study for small aneurysms, vasculitis, and other vascular abnormalities.  Impression: 1. Marked intracranial atherosclerotic disease with chronic mild-to-moderate luminal stenosis of the bilateral cavernous/supraclinoid segments of the internal carotid arteries, not significantly changed in comparison to the prior study on 03/24/2019.  2. 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  3. Diminutive left vertebral artery with abnormal flow in this  patient with known high-grade stenosis/occlusion.    Electronically signed by: Abhay Nails  Date:    11/18/2020  Time:    15:51  MRI Brain Without Contrast  Narrative: EXAMINATION:  MRI BRAIN WITHOUT CONTRAST    CLINICAL HISTORY:  Stroke, follow up;.    TECHNIQUE:  Multiplanar multisequence MR imaging of the brain was performed without the administration of intravenous contrast.    COMPARISON:  CT head 11/17/2020, MRI brain [id number]    FINDINGS:  Prominence of the ventricles and sulci compatible with moderate generalized cerebral volume loss.  No hydrocephalus. No extra-axial blood or fluid collections.    Redemonstration of a chronic left posterior cerebral artery territory infarct involving the left thalamus, paramedian left posterior temporal and parietal lobes and splenium of the corpus callosum, with associated encephalomalacia and gyral T1 hyperintensity compatible with cortical laminar necrosis.  Small bilateral cerebellar hemisphere chronic infarcts.  Stable ex vacuo dilatation of the atrium and occipital horn of the left lateral ventricle.  Confluent and scattered areas of T2/FLAIR hyperintense signal involving the periventricular and deep cerebral white matter, which are nonspecific but probably represent a significant degree of chronic microvascular ischemic change.  Diffusion-weighted images demonstrate no evidence of an acute infarct.   Susceptibility weighted images demonstrate no evidence of acute or chronic hemorrhage.    Normal vascular flow voids are preserved.    Bone marrow signal intensity is normal. Paranasal sinuses and mastoid air cells are essentially clear. Orbits are unremarkable.  Impression: 1. No acute intracranial abnormality.  2. Moderate generalized cerebral volume loss with significant chronic microvascular ischemic change.  Stable remote left posterior cerebral artery territory infarct with encephalomalacia and cortical laminar necrosis.    Electronically signed by: Abhay  Jesu  Date:    11/18/2020  Time:    15:34  Echo Color Flow Doppler? Yes  · There is left ventricular concentric hypertrophy.  · The estimated PA systolic pressure is 35 mmHg.  · Moderate left atrial enlargement.  · The left ventricle is mildly enlargedThe estimated ejection fraction is   34%.  · There is mild left ventricular global hypokinesis.  · Atrial fibrillation observed with restrictive filling pattern present.  · With normal left atrial pressure.  · Normal right ventricular size with normal right ventricular systolic   function.  · Severe aortic regurgitation.  · Moderate-to-severe aortic valve stenosis.  · Aortic valve area is 1.04 cm2; peak velocity is 3.38 m/s; mean gradient   is 26 mmHg.  · Mild mitral regurgitation.  · There is mild pulmonary hypertension.  · Mild to moderate tricuspid regurgitation.  · Normal central venous pressure (3 mmHg).     Patient has left ventricle hypertrophy depressed ejection fraction, atrial   fibrillation, mild pulmonary hypertension  Moderately severe aortic valvular stenosis and moderately severe to severe   aortic regurgitation recorded  Bubble study was negative for PFO        Assessment and Plan:  PMH includes: afib, PFO, CVA, DB2, ESRD, CAD, epilepsy, dementia    Emesis/Facial Droop (Left) Resolved  -Clinical TIA  -CT head: old CVA left PCA territory  -MRI brain: negative acute  -MRA brain: marked atherosclerotic disease with mild to moderate stenosis, details above  -ECHO: Afib, negative for shunt, Mod LAE  -Lipids: 83 chol 46 ldl  -A1c: 5.1  -stroke prevention: eliquis 2.5 mg BID, statin 40 mg daily    H/O Epilepsy  -Keppra 750 ml daily  -Last EEG march 2020 [place]  -AED level ordered      PLAN: No stroke on MRI brain. No bleed on CT head. Continue eliquis at this time.  His was likely worsening of his initial stroke symptoms secondary to GI infection.  No further neurological workup recommended at this time.  Neurology will sign off.  Please call with any  questions or decline.       [phone] from discharge.     Stroke education was provided including stroke risk factors modification and any acute neurological changes including weakness, confusion, visual changes to come straight to the ER.     All questions were answered.                                  Active Diagnoses:    Diagnosis Date Noted POA    PRINCIPAL PROBLEM:  Stroke [I63.9] 11/17/2020 Yes    PEG (percutaneous endoscopic gastrostomy) status [Z93.1] 08/16/2020 Not Applicable    ESRD (end stage renal disease) [N18.6] 03/12/2019 Yes    Persistent atrial fibrillation [I48.19] 03/12/2019 Yes    Anemia of chronic disease [D63.8] 12/15/2014 Yes    GERD (gastroesophageal reflux disease) [K21.9] 06/11/2013 Yes    CAD (coronary artery disease), 2V CABG 2007 [I25.10]  Yes    Essential hypertension [I10] 03/27/2012 Yes    Hyperlipidemia [E78.5] 03/27/2012 Yes    UTI (urinary tract infection) [N39.0] 02/16/2012 Yes      Problems Resolved During this Admission:       VTE Risk Mitigation (From admission, onward)         Ordered     IP VTE HIGH RISK PATIENT  Once      11/17/20 2247     Place sequential compression device  Until discontinued      11/17/20 2247     Reason for No Pharmacological VTE Prophylaxis  Once     Question:  Reasons:  Answer:  Already adequately anticoagulated on oral Anticoagulants    11/17/20 2247                Thank you for your consult.   Rebeka Corley NP  Neurology  [facility]

## 2020-11-20 NOTE — NURSING
Spoke to pt's caregiver purvi who said she is at front entrance of hospital top  pt for discharge. explained discharge instructions to her thoroughly. IV removed cathter intact, tele removed returned to monitor room. vials stable and pt denied any complaints nor had any visible signs of issues.  Placed d/c paperwork in d/c folder and gathered all pt belongings in room and put all in a pt belonging bag that was with pt at time of wheelchair transfer off unit. Transferred off unit via wheelchair.

## 2020-11-20 NOTE — PROGRESS NOTES
11/20/20 1245   Post-Hemodialysis Assessment   Rinseback Volume (mL) 250 mL   Blood Volume Processed (Liters) 56.4 L   Dialyzer Clearance Lightly streaked   Duration of Treatment (minutes) 180 minutes   Hemodialysis Intake (mL) 500 mL   Total UF (mL) 3000 mL   Net Fluid Removal 3000   Patient Response to Treatment deepali well   Post-Treatment Weight 81.7 kg (180 lb 1.9 oz)   Treatment Weight Change -2   Arterial bleeding stop time (min) 5 min   Venous bleeding stop time (min) 5 min   Post-Hemodialysis Comments no issues   no problems  With HD

## 2020-11-20 NOTE — RESPIRATORY THERAPY
11/20/20 0700   Patient Assessment/Suction   Level of Consciousness (AVPU) alert   All Lung Fields Breath Sounds clear   PRE-TX-O2   O2 Device (Oxygen Therapy) room air   SpO2 99 %   Pulse Oximetry Type Intermittent   $ Pulse Oximetry - Multiple Charge Pulse Oximetry - Multiple   Pulse 81   Resp 16

## 2020-11-20 NOTE — DISCHARGE SUMMARY
[facility] Hospital Medicine  Discharge Summary      Patient Name: Micheal Hunt  MRN: [id number]  Admission Date: 11/17/2020  Hospital Length of Stay: 0 days  Discharge Date and Time:  11/20/2020 11:47 AM  Attending Physician: Charissa Olivas MD   Discharging Provider: Charissa Olivas MD  Primary Care Provider: Pk Lakhani MD      HPI:   Micheal Hunt is an 80-year-old male with past medical history significant for stroke, dementia, coronary artery disease, atrial fibrillation, end-stage renal disease on dialysis, type 2 diabetes, and hypertension who presented to the emergency department with an onset of facial droop which began this afternoon and has since resolved.  Patient's caregiver is at bedside and states that patient was in his usual state of health yesterday but has been vomiting today.  Caregiver reports that patient had a PEG tube placed last week  due to aspiration.  They have only used it for medication administration and some supplemental nutrition at night.  Family has still allowed patient oral intake.  Reports subjective fever this morning and increased cough.  No apparent SOB or chest pain.  She states that the PEG tube has not been used more consistently because the patient complains of pain in that area.  Reports previous stroke which occurred about a year ago with residual right-sided weakness.  Patient is wheelchair-bound. While in the ED, pt underwent a tele-stroke evaluation and tPA was not recommended as pt's symptoms had resolved and pt is on Eliquis.  He will be placed in observation for further monitoring and treatment.    Hospital Course:   Patient was admitted to medicine telemetry service and was closely monitored.  Patient was evaluated by neurologist.  Patient underwent cerebrovascular accident workup which was negative for acute process.  Per neurologist evaluation patient had recrudescence of prior stroke.  No further workup recommended.  Patient  received routine hemodialysis by a nephrologist.  Peg tube feeding continued.  Patient was treated for urinary tract infection with intravenous antibiotic.  Urine cultures remained negative.  Patient to complete antibiotic course at home.  Patient noted to have a scant hematuria but no further hematuria noted upon further observation.  I discussed with patient's caregiver giver and explained them, if patient continues to have the symptoms, patient can follow-up with urologist for opinion.  At this time patient is without any complaints and ready to go home.  Patient will continue routine hemodialysis treatments as before.     Consults:   Consults (From admission, onward)        Status Ordering Provider     Inpatient consult to Nephrology  Once     Provider:  Frankie Rojo MD    Completed RAMSEY FARIA     Inpatient consult to Neurology  Once     Provider:  (Not yet assigned)    Acknowledged RAMSEY FARIA     Inpatient consult to Registered Dietitian/Nutritionist  Once     Provider:  (Not yet assigned)    Completed RAMSEY FARIA     Inpatient consult to Registered Dietitian/Nutritionist  Once     Provider:  (Not yet assigned)    Completed RAMSEY FARIA     Inpatient consult to Telemedicine-Stroke  Once     Provider:  (Not yet assigned)    Acknowledged CÉSAR ZHENG     IP consult to case management/social work  Once     Provider:  (Not yet assigned)    Completed RAMSEY FARIA        CT head without contrast:   Pre-existing moderate brain atrophy with extensive deep white matter ischemic changes.  Old ischemic CVA of the left temporoparietal and occipital lobes.  No acute CVA, hemorrhage or hematoma is demonstrated.     CXR: No definite evidence of acute cardiopulmonary disease.     KUB: Nonobstructive bowel gas pattern.  No definite free air.    MRI brain without contrast:  1. No acute intracranial abnormality.  2. Moderate generalized cerebral volume loss with  significant chronic microvascular ischemic change.  Stable remote left posterior cerebral artery territory infarct with encephalomalacia and cortical laminar necrosis.    MRA brain:  1. No acute intracranial abnormality.  2. Moderate generalized cerebral volume loss with significant chronic microvascular ischemic change.  Stable remote left posterior cerebral artery territory infarct with encephalomalacia and cortical laminar necrosis.    MRA neck:  1. Chronic moderate to severe luminal narrowing stenoses of the proximal bilateral internal carotid arteries.  2. Diminutive left vertebral artery with known intermittent high-grade stenoses/occlusions along its V2 and V3 segments, better characterized on the prior CTA dated 03/24/2019.    ECHO:  · There is left ventricular concentric hypertrophy.  · The estimated PA systolic pressure is 35 mmHg.  · Moderate left atrial enlargement.  · The left ventricle is mildly enlargedThe estimated ejection fraction is 34%.  · There is mild left ventricular global hypokinesis.  · Atrial fibrillation observed with restrictive filling pattern present.  · With normal left atrial pressure.  · Normal right ventricular size with normal right ventricular systolic function.  · Severe aortic regurgitation.  · Moderate-to-severe aortic valve stenosis.  · Aortic valve area is 1.04 cm2; peak velocity is 3.38 m/s; mean gradient is 26 mmHg.  · Mild mitral regurgitation.  · There is mild pulmonary hypertension.  · Mild to moderate tricuspid regurgitation.  · Normal central venous pressure (3 mmHg).     Patient has left ventricle hypertrophy depressed ejection fraction, atrial fibrillation, mild pulmonary hypertension  Moderately severe aortic valvular stenosis and moderately severe to severe aortic regurgitation recorded  Bubble study was negative for PFO    Final Active Diagnoses:    Diagnosis Date Noted POA    PRINCIPAL PROBLEM:  Stroke [I63.9] 11/17/2020 Yes    PEG (percutaneous endoscopic  gastrostomy) status [Z93.1] 08/16/2020 Not Applicable    ESRD (end stage renal disease) [N18.6] 03/12/2019 Yes    Persistent atrial fibrillation [I48.19] 03/12/2019 Yes    Anemia of chronic disease [D63.8] 12/15/2014 Yes    GERD (gastroesophageal reflux disease) [K21.9] 06/11/2013 Yes    CAD (coronary artery disease), 2V CABG 2007 [I25.10]  Yes    Essential hypertension [I10] 03/27/2012 Yes    Hyperlipidemia [E78.5] 03/27/2012 Yes    UTI (urinary tract infection) [N39.0] 02/16/2012 Yes      Problems Resolved During this Admission:       Discharged Condition: good    Disposition: Home or Self Care    Follow Up:  Follow-up Information     [facility].    Specialties: Hospice Services, Home Health Services  Why: Home Health  Contact information:  [address] [place] [address]  [place] [address]  [phone]             Sarina Benson DNP On 12/4/2020.    Specialties: Family Medicine, Emergency Medicine  Why: bryan  Contact information:  [address] [facility] [address]  [phone]             Rebeka Corley NP.    Specialty: Neurology  Why: cm left messge on answering machine  Contact information:  [address] [facility] [address]  [phone]             [facility].    Contact information:  [address] [facility] [address]  [phone]           Kevin Ramirez MD.    Specialty: Neurology  Contact information:  [address] [facility] [address]  [phone]             Schedule an appointment as soon as possible for a visit to follow up.               Patient Instructions:      HOME HEALTH ORDERS   Order Comments: Subsequent Home Health Orders    Current Medications:  Current Facility-Administered Medications:  acetaminophen tablet 650 mg, 650 mg, Per G Tube, Q6H PRN, ROSALBA Chandler, 650 mg at 11/18/20 2244  allopurinoL tablet 100 mg, 100 mg, Per G Tube, Daily, ROSALBA Chandler, Stopped at 11/19/20 0900  aspirin chewable  tablet 81 mg, 81 mg, Per G Tube, Daily, ROSALBA Chandler, 81 mg at 11/19/20 0938  atorvastatin tablet 40 mg, 40 mg, Per G Tube, Daily, ROSALBA Chandler, 40 mg at 11/19/20 0938  cefTRIAXone (ROCEPHIN) 1 g/50 mL D5W IVPB, 1 g, Intravenous, Q24H, ROSALBA Chandler, 1 g at 11/18/20 2244  famotidine tablet 20 mg, 20 mg, Per G Tube, Daily, Tim Arce MD, 20 mg at 11/19/20 0938  labetaloL injection 10 mg, 10 mg, Intravenous, Q6H PRN, ROSALBA Chandler  levetiracetam oral soln Soln 750 mg, 7.5 mL, Per G Tube, Daily, ROSALBA Chandler, 750 mg at 11/19/20 0938  mupirocin 2 % ointment, , Nasal, BID, Darrel Cary MD  neomycin-bacitracin-polymyxin ointment, , Topical (Top), BID, Debbie Sofia NP  ondansetron injection 4 mg, 4 mg, Intravenous, Q12H PRN, ROSALBA Chandler  senna-docusate 8.6-50 mg per tablet 1 tablet, 1 tablet, Per G Tube, Daily, ROSALBA Chandler, 1 tablet at 11/19/20 0938  sodium chloride 0.9% bolus 500 mL, 500 mL, Intravenous, Continuous PRN, ROSALBA Chandler  sodium chloride 0.9% flush 10 mL, 10 mL, Intravenous, PRN, ROSALBA Chandler        Nursing:   PEG Care:  Instruct patient/caregiver to clean site.  Monitor skin integrity.    Diet:   other tube feeding    Activities:   activity as tolerated    Labs:  None      Referrals/ Consults  Physical Therapy to evaluate and treat. Evaluate for home safety and equipment needs; Establish/upgrade home exercise program. Perform / instruct on therapeutic exercises, gait training, transfer training, and Range of Motion.    Home Health Aide:  Physical Therapy Other: eval and treat     Order Specific Question Answer Comments   What Home Health Agency is the patient currently using? Other/External      Tube Feedings/Formulas     Order Specific Question Answer Comments   Select Adult Formula: Novasource Renal    Route: Gastrostomy    Formula Rate (mL/hr): 30      Activity as tolerated      Activity as tolerated   Order Comments: Up with assist, rotate patient q 2 hrs. Observe fall precautions       Significant Diagnostic Studies: Labs:   CMP   Recent Labs   Lab 11/19/20 0435 11/20/20 0954    138   K 4.1 4.1    104   CO2 24 24   GLU 82 123*   BUN 23 29*   CREATININE 5.2* 5.3*   CALCIUM 9.1 8.5*   PROT 7.2  --    ALBUMIN 3.8  --    BILITOT 0.5  --    ALKPHOS 79  --    AST 10  --    ALT 11  --    ANIONGAP 13 10   ESTGFRAFRICA 11* 11*   EGFRNONAA 10* 9*    and CBC   Recent Labs   Lab 11/19/20 0435 11/20/20 0954   WBC 8.53 7.02   HGB 10.7* 9.9*   HCT 32.6* 30.2*    149*       Pending Diagnostic Studies:     Procedure Component Value Units Date/Time    Basic Metabolic Panel [[id number]] Collected: 11/20/20 0954    Order Status: Sent Lab Status: In process Updated: 11/20/20 0954    Specimen: Blood     CBC Auto Differential [[id number]] Collected: 11/20/20 0954    Order Status: Sent Lab Status: In process Updated: 11/20/20 0954    Specimen: Blood     Levetiracetam level [[id number]] Collected: 11/19/20 0435    Order Status: Sent Lab Status: In process Updated: 11/19/20 0538    Specimen: Blood          Medications:  Reconciled Home Medications:      Medication List      START taking these medications    cephALEXin 500 MG capsule  Commonly known as: KEFLEX  Take 1 capsule (500 mg total) by mouth every 8 (eight) hours. for 5 days        CHANGE how you take these medications    carvediloL 12.5 MG tablet  Commonly known as: COREG  Take 6.5 tablets (81.25 mg total) by mouth 2 (two) times daily.  What changed:   · how much to take  · how to take this  · when to take this     NEPHRO-EMMETT RX 1- mg-mg-mcg Tab  Generic drug: vit b cmplx 3-fa-vit c-biotin 1- mg-mg-mcg  Take 1 tablet by mouth once daily.  What changed: how to take this        CONTINUE taking these medications    allopurinoL 100 MG tablet  Commonly known as: ZYLOPRIM  100 mg by FEEDING TUBE route once daily.     apixaban  2.5 mg Tab  Commonly known as: ELIQUIS  2.5 mg by FEEDING TUBE route 2 (two) times daily.     aspirin 81 MG EC tablet  Commonly known as: ECOTRIN  81 mg by FEEDING TUBE route once daily.     atorvastatin 40 MG tablet  Commonly known as: LIPITOR  1 tablet (40 mg total) by Per G Tube route once daily.     levETIRAcetam 100 mg/mL Soln  Commonly known as: KEPPRA  7.5 ML BY PEG DAILY     * NEPRO CARB STEADY ORAL  240 mLs by FEEDING TUBE route 2 (two) times daily. 6 hours apart AM and afternoon     * NEPRO CARB STEADY ORAL  1,000 mLs by FEEDING TUBE route every evening.     senna-docusate 8.6-50 mg 8.6-50 mg per tablet  Commonly known as: PERICOLACE  1 tablet by Per G Tube route once daily.     sertraline 25 MG tablet  Commonly known as: ZOLOFT  Take 1 tablet (25 mg total) by mouth every evening.         * This list has 2 medication(s) that are the same as other medications prescribed for you. Read the directions carefully, and ask your doctor or other care provider to review them with you.                Indwelling Lines/Drains at time of discharge:   Lines/Drains/Airways     Drain                 Gastrostomy/Enterostomy Gastrostomy tube w/ balloon -- days         Hemodialysis AV Fistula Left upper arm -- days                Time spent on the discharge of patient: 29 minutes  Patient was seen and examined on the date of discharge and determined to be suitable for discharge.         Charissa Olivas MD  Department of Hospital Medicine  [facility]

## 2020-11-20 NOTE — CONSULTS
Nephrology Consult Note        Patient Name: Micheal Hunt  MRN: [id number]    Patient Class: OP- Observation   Admission Date: 11/17/2020  Length of Stay: 0 days  Date of Service: 11/20/2020    Attending Physician: Charissa Olivas MD  Primary Care Provider: Pk Lakhani MD    Reason for Consult: esrd/anemia/htn/shpt/stroke    SUBJECTIVE:     HPI: 80M with h/o stroke, dementia, CAD, AF, ESRD on HD MWF, DM2, HTN presented with facial droop, vomiting, cough, subjective fevers. Patient had a PEG tube placed last week due to aspiration. They have only used it for medication administration and some supplemental nutrition at night. Family has still allowed patient oral intake. PEG tube has not been used more consistently because the patient complains of pain in that area. Previous stroke occurred about a year ago with residual right-sided weakness. Patient is wheelchair-bound. While in the ED, pt underwent a tele-stroke evaluation and tPA was not recommended as pt's symptoms had resolved and pt is on Eliquis.    11/18 VSS, seen and examined on HD, tolerating well.  11/19 VSS, no new complains, HD in AM.  11/20 VSS, seen and examined on HD, tolerating well. Can be dc after HD.    Past Medical History:   Diagnosis Date    NICK (acute kidney injury) 7/29/2016    Allergy     Anemia, mild 12/15/2014    Arthritis     Gout    Atrial fibrillation 03/2019    Benign essential HTN 3/27/2012    BMI 29.0-29.9,adult 5/10/2018    BPH (benign prostatic hyperplasia)     BPH (benign prostatic hyperplasia)     CAD (coronary artery disease) 2006    Carotid artery occlusion     60-70% FROYLAN, LICA < 50%    Chronic kidney disease     due to ibuprofen    Colon polyp     CRF (chronic renal failure), stage 5     Diabetes mellitus     Diverticulosis     ESRD (end stage renal disease) on dialysis     Gastritis     GERD (gastroesophageal reflux disease)     Gout     History of colon polyps 5/3/2018    HTN (hypertension)  3/27/2012    Hyperlipidemia     Hyperlipidemia     LLL pneumonia 6/14/2018    LVH (left ventricular hypertrophy)     Mesenteric ischemia     Murmur, cardiac 3/27/2012    GRICELDA (obstructive sleep apnea)     DOES NOT USE A MACHINE    Sinus problem     Stroke 03/2019    acute left PCA    Syncope and collapse      Past Surgical History:   Procedure Laterality Date    APPENDECTOMY      CARDIAC CATHETERIZATION  2012    LIMA to LAD patent, SVG to RCA    COLONOSCOPY  2011    COLONOSCOPY N/A 5/3/2018    Procedure: COLONOSCOPY;  Surgeon: Messi Harris MD;  Location: [facility] ENDO;  Service: Endoscopy;  Laterality: N/A;    COLONOSCOPY N/A 8/18/2020    Procedure: COLONOSCOPY;  Surgeon: Messi Harris MD;  Location: [facility] ENDO;  Service: Endoscopy;  Laterality: N/A;    CORONARY ARTERY BYPASS GRAFT  4/2007    x 2 [place]    ESOPHAGOGASTRODUODENOSCOPY N/A 8/17/2020    Procedure: EGD (ESOPHAGOGASTRODUODENOSCOPY);  Surgeon: Eben Soto MD;  Location: [facility] ENDO;  Service: Endoscopy;  Laterality: N/A;    ESOPHAGOGASTRODUODENOSCOPY N/A 11/5/2020    Procedure: EGD (ESOPHAGOGASTRODUODENOSCOPY)(PEG removal and Arturo placement;  Surgeon: Messi Harris MD;  Location: [facility] ENDO;  Service: Endoscopy;  Laterality: N/A;    INSERTION OR REPLACEMENT OF PERCUTANEOUS ENDOSCOPIC JEJUNOSTOMY TUBE N/A 11/5/2020    Procedure: INSERTION OR REPLACEMENT, JEJUNOSTOMY TUBE, PERCUTANEOUS, ENDOSCOPIC;  Surgeon: Messi Harris MD;  Location: [facility] ENDO;  Service: Endoscopy;  Laterality: N/A;    JOINT REPLACEMENT      left knee total replacement  X 3    mid leftt finger      from a cactuss    MOLE REMOVAL  2016    rotative cuff      no rotative cuffs on bilat shoulders has pins     SHOULDER SURGERY      shoulder surgery bilat  RIGHT X 4; LEFT X 3     Family History   Problem Relation Age of Onset    Heart disease Mother     Sudden death Father     Cancer Father         advanced lung ca- found after 2 story fall     Stroke Sister     Pneumonia Sister          from PNA    Heart disease Sister     Hypertension Brother     Kidney cancer Neg Hx     Prostate cancer Neg Hx     Urolithiasis Neg Hx     Allergic rhinitis Neg Hx     Allergies Neg Hx     Angioedema Neg Hx     Asthma Neg Hx     Atopy Neg Hx     Eczema Neg Hx     Immunodeficiency Neg Hx     Rhinitis Neg Hx     Urticaria Neg Hx      Social History     Tobacco Use    Smoking status: Never Smoker    Smokeless tobacco: Never Used   Substance Use Topics    Alcohol use: No     Frequency: Never     Drinks per session: Patient refused     Binge frequency: Patient refused    Drug use: No       Review of patient's allergies indicates:   Allergen Reactions    Ace inhibitors Other (See Comments)     Cough    Angiotensin ii     Arb-angiotensin receptor antagonist Itching    Eplerenone Other (See Comments)     Marked bradycardia, 40, tiredness and weakness      Gabapentin Hallucinations    Sulfa (sulfonamide antibiotics) Itching and Swelling     Patient says this was 10 years ago and doesn't remember what happened       Outpatient meds:  No current facility-administered medications on file prior to encounter.      Current Outpatient Medications on File Prior to Encounter   Medication Sig Dispense Refill    allopurinoL (ZYLOPRIM) 100 MG tablet 100 mg by FEEDING TUBE route once daily.      apixaban (ELIQUIS) 2.5 mg Tab 2.5 mg by FEEDING TUBE route 2 (two) times daily.      aspirin (ECOTRIN) 81 MG EC tablet 81 mg by FEEDING TUBE route once daily.      atorvastatin (LIPITOR) 40 MG tablet 1 tablet (40 mg total) by Per G Tube route once daily. 90 tablet 4    carvediloL (COREG) 12.5 MG tablet Take 6.5 tablets (81.25 mg total) by mouth 2 (two) times daily. (Patient taking differently: 12.5 mg by Per G Tube route once daily. ) 90 tablet 4    levETIRAcetam (KEPPRA) 100 mg/mL Soln 7.5 ML BY PEG DAILY 120 mL 5    nut.tx.imp.renal fxn,lac-reduc (NEPRO CARB STEADY  ORAL) 240 mLs by FEEDING TUBE route 2 (two) times daily. 6 hours apart AM and afternoon      nut.tx.imp.renal fxn,lac-reduc (NEPRO CARB STEADY ORAL) 1,000 mLs by FEEDING TUBE route every evening.       senna-docusate 8.6-50 mg (PERICOLACE) 8.6-50 mg per tablet 1 tablet by Per G Tube route once daily. 30 tablet 11    sertraline (ZOLOFT) 25 MG tablet Take 1 tablet (25 mg total) by mouth every evening. 30 tablet 3    vit b cmplx 3-fa-vit c-biotin 1- mg-mg-mcg (NEPHRO-EMMETT RX) 1- mg-mg-mcg Tab Take 1 tablet by mouth once daily. (Patient taking differently: 1 tablet by Per G Tube route once daily. ) 90 tablet 3    [DISCONTINUED] levETIRAcetam (KEPPRA) 100 mg/mL Soln 7.5 ml by PEG daily (Patient taking differently: 750 mg by Per G Tube route once daily. 7.5 ml by PEG daily) 120 mL 5       Scheduled meds:   allopurinoL  100 mg Per G Tube Daily    apixaban  2.5 mg Per G Tube BID    aspirin  81 mg Per G Tube Daily    atorvastatin  40 mg Per G Tube Daily    cephALEXin  500 mg Oral Q8H    famotidine  20 mg Per G Tube Daily    levetiracetam oral soln  500 mg Oral Once per day on Mon Wed Fri    levetiracetam oral soln  7.5 mL Per G Tube Daily    mupirocin   Nasal BID    neomycin-bacitracin-polymyxin   Topical (Top) BID    senna-docusate 8.6-50 mg  1 tablet Per G Tube Daily       Infusions:   sodium chloride 0.9%         PRN meds:  acetaminophen, labetaloL, melatonin, ondansetron, sodium chloride 0.9%, sodium chloride 0.9%    Review of Systems:  ROS    OBJECTIVE:     Vital Signs and IO (Last 24H):  Temp:  [96.3 °F (35.7 °C)-98.3 °F (36.8 °C)]   Pulse:  [60-93]   Resp:  [16-18]   BP: (134-189)/(61-99)   SpO2:  [97 %-99 %]   I/O last 3 completed shifts:  In: 895 [NG/GT:845; IV Piggyback:50]  Out: 0     Wt Readings from Last 5 Encounters:   11/18/20 86.2 kg (190 lb 0.6 oz)   11/05/20 83.9 kg (185 lb)   10/06/20 82.9 kg (182 lb 12.2 oz)   09/30/20 85 kg (187 lb 6.3 oz)   09/29/20 85.3 kg (188 lb)          Physical Exam:  Physical Exam  Constitutional:       Appearance: He is well-developed. He is not diaphoretic.   HENT:      Head: Normocephalic and atraumatic.   Eyes:      General: No scleral icterus.     Pupils: Pupils are equal, round, and reactive to light.   Neck:      Musculoskeletal: Neck supple.   Cardiovascular:      Rate and Rhythm: Normal rate and regular rhythm.   Pulmonary:      Effort: Pulmonary effort is normal. No respiratory distress.      Breath sounds: No stridor.   Abdominal:      General: There is no distension.      Palpations: Abdomen is soft.   Musculoskeletal: Normal range of motion.         General: No deformity.   Skin:     General: Skin is warm and dry.      Findings: No erythema or rash.   Neurological:      Mental Status: He is alert and oriented to person, place, and time.      Cranial Nerves: No cranial nerve deficit.      Motor: Weakness and abnormal muscle tone present.   Psychiatric:         Behavior: Behavior normal.         Body mass index is 25.07 kg/m².    Laboratory:  Recent Labs   Lab 11/18/20  0434 11/19/20  0435 11/20/20  0954    142 138   K 4.2 4.1 4.1    105 104   CO2 23 24 24   BUN 30* 23 29*   CREATININE 6.3* 5.2* 5.3*   ESTGFRAFRICA 9* 11* 11*   EGFRNONAA 8* 10* 9*   GLU 96 82 123*       Recent Labs   Lab 11/17/20  1858 11/18/20  0434 11/19/20  0435 11/20/20  0954   CALCIUM 8.8 8.5* 9.1 8.5*   ALBUMIN 3.9 3.5 3.8  --    PHOS  --  4.2  --   --    MG  --  1.9  --   --        Recent Labs   Lab 07/26/18  0730 08/14/18  1020 11/12/18  0843   PTH, Intact 14.0 19.2 388.8 H       Recent Labs   Lab 11/17/20  1854   POCTGLUCOSE 106       Recent Labs   Lab 03/11/19  1109 03/21/19  0456 11/18/20  0434   Hemoglobin A1C 7.5 H 8.3 H 5.1       Recent Labs   Lab 11/18/20  0434 11/19/20  0435 11/20/20  0954   WBC 7.37 8.53 7.02   HGB 9.4* 10.7* 9.9*   HCT 28.7* 32.6* 30.2*   * 167 149*   MCV 99* 98 97   MCHC 32.8 32.8 32.8   MONO 7.1  0.5 7.7  0.7 5.0  0.4        Recent Labs   Lab 11/17/20  1858 11/18/20  0434 11/19/20  0435   BILITOT 0.5 0.4 0.5   PROT 7.2 6.6 7.2   ALBUMIN 3.9 3.5 3.8   ALKPHOS 77 69 79   ALT 12 10 11   AST 9* 10 10       Recent Labs   Lab 03/11/19  1823 04/02/19  0739 09/22/20  1031 11/17/20 2150   Color, UA Yellow Yellow Yellow Yellow   Appearance, UA Clear Clear Clear Hazy A   pH, UA 6.0 >8.0 A 6.0 6.0   Specific Faber, UA 1.010 1.010 1.020 1.025   Protein, UA 2+ A 2+ A 2+ A 3+ A   Glucose, UA 3+ A Negative Negative Negative   Ketones, UA Negative Negative Negative Negative   Urobilinogen, UA Negative  --  Negative Negative   Bilirubin (UA) Negative Negative Negative Negative   Occult Blood UA 1+ A 1+ A 3+ A 3+ A   Nitrite, UA Negative Negative Negative Negative   RBC, UA 4 3 75 H 30 H   WBC, UA 0 2 0 25 H   Bacteria None Occasional Many A Rare   Hyaline Casts, UA 0 0 1 0       Recent Labs   Lab 03/24/19  1419 11/17/20  1911   POC PH 7.397  --    POC PCO2 31.7 L  --    POC HCO3 19.5 L  --    POC PO2 91  --    POC SATURATED O2 97  --    POC BE -5  --    Sample ARTERIAL unknown       Microbiology Results (last 7 days)     Procedure Component Value Units Date/Time    Urine culture [[id number]] Collected: 11/17/20 2150    Order Status: Completed Specimen: Urine Updated: 11/19/20 0850     Urine Culture, Routine Multiple organisms isolated. None in predominance.  Repeat if      clinically necessary.    Narrative:      Specimen Source->Urine          ASSESSMENT/PLAN:     Active Hospital Problems    Diagnosis  POA    *Stroke [I63.9]  Yes    PEG (percutaneous endoscopic gastrostomy) status [Z93.1]  Not Applicable    ESRD (end stage renal disease) [N18.6]  Yes    Persistent atrial fibrillation [I48.19]  Yes    Anemia of chronic disease [D63.8]  Yes    GERD (gastroesophageal reflux disease) [K21.9]  Yes    CAD (coronary artery disease), 2V CABG 2007 [I25.10]  Yes    Essential hypertension [I10]  Yes    Hyperlipidemia [E78.5]  Yes    UTI (urinary  tract infection) [N39.0]  Yes     Dx updated per 2019 IMO Load        Resolved Hospital Problems   No resolved problems to display.       ESRD on HD MWF via AVF  Continue current dialysis prescription.  Next HD per schedule.  Renal diet - low K, low phos.  No IVs or BP checks on access and/or non-dominant arm.    Anemia of CKD  Hgb and HCT are acceptable. Monitor.  Will provide NIC and/or IV iron PRN.    MBD / Secondary HPT  Ca, phos, PTH and vitamin D levels are acceptable.   Phos binders, vitamin D analogues and calcimimetics as needed.    HTN  BP seem controlled.   Tolerate asymptomatic HTN up to -160.  Continue home meds.  Low sodium diet.    Stroke vs TIA, nausea, vomiting  Plan per primary team and neurology.  Needs to receive extra dose of Keppra AFTER HD on HD days.    Thank you for allowing us to participate in the care of your patient!   We will follow the patient and provide recommendations as needed.    Patient care time was spent personally by me on the following activities:   · Obtaining a history.  · Examination of patient.  · Providing medical care at the patients bedside.  · Developing a treatment plan with patient or surrogate and bedside caregivers.  · Ordering and reviewing laboratory studies, radiographic studies, pulse oximetry.  · Ordering and performing treatments and interventions.  · Evaluation of patient's response to treatment.  · Discussions with consultants while on the unit and immediately available to the patient.  · Re-evaluation of the patient's condition.  · Documentation in the medical record.     Darrel Cary MD    [facility]  [address] [place] [address]  [place] [address]    [phone] - tel  [phone] - fax    11/20/2020

## 2020-11-20 NOTE — PLAN OF CARE
The pt is clear for discharge from case management. Viktoria Cm LCSW     11/20/20 1578   Final Note   Assessment Type Final Discharge Note   Anticipated Discharge Disposition Home-Health

## 2020-11-24 NOTE — TELEPHONE ENCOUNTER
This nurse called to check on patient. Home health states that daughter Tonya does not want OT to return to home at this time. Feels like patient is having behaviors. All phone numbers listed in Epic attempted to no avail. No answer received.

## 2020-11-24 NOTE — TELEPHONE ENCOUNTER
----- Message from Malissa Flowers sent at 11/24/2020  2:50 PM CST -----  Regarding: advice  Contact: [facility]  Advice  Zoey called and said he is refusing home irma OT. Please call Zoey  [phone]

## 2020-12-01 NOTE — PROGRESS NOTES
Subjective:       Patient ID: Micheal Hunt is a 80 y.o. male.    Chief Complaint: No chief complaint on file.    The patient location is: [place]/ [place]  The chief complaint leading to consultation is: hospital follow up    Visit type: audiovisual    Face to Face time with patient: 25 minutes  40 minutes of total time spent on the encounter, which includes face to face time and non-face to face time preparing to see the patient (eg, review of tests), Obtaining and/or reviewing separately obtained history, Documenting clinical information in the electronic or other health record, Independently interpreting results (not separately reported) and communicating results to the patient/family/caregiver, or Care coordination (not separately reported).         Each patient to whom he or she provides medical services by telemedicine is:  (1) informed of the relationship between the physician and patient and the respective role of any other health care provider with respect to management of the patient; and (2) notified that he or she may decline to receive medical services by telemedicine and may withdraw from such care at any time.    Mr. Hunt presents via telemedicine with the assistance of his his daughters for hospital follow up. She hospital HPI and hospital course below. The patient was admitted to [facility] due to development of facial droop. The patient was diagnosed with a UTI. The patient received neurology and nephrology consultations. No medications were changed. The patient reports he is feeling well today. The patient receives dialysis three times per week at [facility] in [place]. The patient has home health through [facility].         See Hospital Course/ HPI below:     [facility] Hospital Medicine  Discharge Summary        Patient Name: Micheal Hunt  MRN: [id number]  Admission Date: 11/17/2020  Hospital Length of Stay: 0 days  Discharge  Date and Time:  11/20/2020 11:47 AM  Attending Physician: Charissa Olivas MD   Discharging Provider: Charissa Olivas MD  Primary Care Provider: Pk Lakhani MD        HPI:   Micheal Hunt is an 80-year-old male with past medical history significant for stroke, dementia, coronary artery disease, atrial fibrillation, end-stage renal disease on dialysis, type 2 diabetes, and hypertension who presented to the emergency department with an onset of facial droop which began this afternoon and has since resolved.  Patient's caregiver is at bedside and states that patient was in his usual state of health yesterday but has been vomiting today.  Caregiver reports that patient had a PEG tube placed last week  due to aspiration.  They have only used it for medication administration and some supplemental nutrition at night.  Family has still allowed patient oral intake.  Reports subjective fever this morning and increased cough.  No apparent SOB or chest pain.  She states that the PEG tube has not been used more consistently because the patient complains of pain in that area.  Reports previous stroke which occurred about a year ago with residual right-sided weakness.  Patient is wheelchair-bound. While in the ED, pt underwent a tele-stroke evaluation and tPA was not recommended as pt's symptoms had resolved and pt is on Eliquis.  He will be placed in observation for further monitoring and treatment.     Hospital Course:   Patient was admitted to medicine telemetry service and was closely monitored.  Patient was evaluated by neurologist.  Patient underwent cerebrovascular accident workup which was negative for acute process.  Per neurologist evaluation patient had recrudescence of prior stroke.  No further workup recommended.  Patient received routine hemodialysis by a nephrologist.  Peg tube feeding continued.  Patient was treated for urinary tract infection with intravenous antibiotic.  Urine cultures remained negative.   Patient to complete antibiotic course at home.  Patient noted to have a scant hematuria but no further hematuria noted upon further observation.  I discussed with patient's caregiver giver and explained them, if patient continues to have the symptoms, patient can follow-up with urologist for opinion.  At this time patient is without any complaints and ready to go home.  Patient will continue routine hemodialysis treatments as before.      CT head without contrast:   Pre-existing moderate brain atrophy with extensive deep white matter ischemic changes.  Old ischemic CVA of the left temporoparietal and occipital lobes.  No acute CVA, hemorrhage or hematoma is demonstrated.     CXR: No definite evidence of acute cardiopulmonary disease.     KUB: Nonobstructive bowel gas pattern.  No definite free air.     MRI brain without contrast:  1. No acute intracranial abnormality.  2. Moderate generalized cerebral volume loss with significant chronic microvascular ischemic change.  Stable remote left posterior cerebral artery territory infarct with encephalomalacia and cortical laminar necrosis.     MRA brain:  1. No acute intracranial abnormality.  2. Moderate generalized cerebral volume loss with significant chronic microvascular ischemic change.  Stable remote left posterior cerebral artery territory infarct with encephalomalacia and cortical laminar necrosis.     MRA neck:  1. Chronic moderate to severe luminal narrowing stenoses of the proximal bilateral internal carotid arteries.  2. Diminutive left vertebral artery with known intermittent high-grade stenoses/occlusions along its V2 and V3 segments, better characterized on the prior CTA dated 03/24/2019.     ECHO:  · There is left ventricular concentric hypertrophy.  · The estimated PA systolic pressure is 35 mmHg.  · Moderate left atrial enlargement.  · The left ventricle is mildly enlargedThe estimated ejection fraction is 34%.  · There is mild left ventricular global  hypokinesis.  · Atrial fibrillation observed with restrictive filling pattern present.  · With normal left atrial pressure.  · Normal right ventricular size with normal right ventricular systolic function.  · Severe aortic regurgitation.  · Moderate-to-severe aortic valve stenosis.  · Aortic valve area is 1.04 cm2; peak velocity is 3.38 m/s; mean gradient is 26 mmHg.  · Mild mitral regurgitation.  · There is mild pulmonary hypertension.  · Mild to moderate tricuspid regurgitation.  · Normal central venous pressure (3 mmHg).     Patient has left ventricle hypertrophy depressed ejection fraction, atrial fibrillation, mild pulmonary hypertension  Moderately severe aortic valvular stenosis and moderately severe to severe aortic regurgitation recorded  Bubble study was negative for PFO             Review of patient's allergies indicates:   Allergen Reactions    Ace inhibitors Other (See Comments)     Cough    Angiotensin ii     Arb-angiotensin receptor antagonist Itching    Eplerenone Other (See Comments)     Marked bradycardia, 40, tiredness and weakness      Gabapentin Hallucinations    Sulfa (sulfonamide antibiotics) Itching and Swelling     Patient says this was 10 years ago and doesn't remember what happened         Current Outpatient Medications:     allopurinoL (ZYLOPRIM) 100 MG tablet, 100 mg by FEEDING TUBE route once daily., Disp: , Rfl:     apixaban (ELIQUIS) 2.5 mg Tab, 2.5 mg by FEEDING TUBE route 2 (two) times daily., Disp: , Rfl:     aspirin (ECOTRIN) 81 MG EC tablet, 81 mg by FEEDING TUBE route once daily., Disp: , Rfl:     atorvastatin (LIPITOR) 40 MG tablet, 1 tablet (40 mg total) by Per G Tube route once daily., Disp: 90 tablet, Rfl: 4    carvediloL (COREG) 12.5 MG tablet, Take 6.5 tablets (81.25 mg total) by mouth 2 (two) times daily. (Patient taking differently: 12.5 mg by Per G Tube route once daily. ), Disp: 90 tablet, Rfl: 4    levETIRAcetam (KEPPRA) 100 mg/mL Soln, 7.5 ML BY PEG  DAILY, Disp: 120 mL, Rfl: 5    nut.tx.imp.renal fxn,lac-reduc (NEPRO CARB STEADY ORAL), 240 mLs by FEEDING TUBE route 2 (two) times daily. 6 hours apart AM and afternoon, Disp: , Rfl:     nut.tx.imp.renal fxn,lac-reduc (NEPRO CARB STEADY ORAL), 1,000 mLs by FEEDING TUBE route every evening. , Disp: , Rfl:     senna-docusate 8.6-50 mg (PERICOLACE) 8.6-50 mg per tablet, 1 tablet by Per G Tube route once daily., Disp: 30 tablet, Rfl: 11    sertraline (ZOLOFT) 25 MG tablet, Take 1 tablet (25 mg total) by mouth every evening., Disp: 30 tablet, Rfl: 3    vit b cmplx 3-fa-vit c-biotin 1- mg-mg-mcg (NEPHRO-EMMETT RX) 1- mg-mg-mcg Tab, Take 1 tablet by mouth once daily. (Patient taking differently: 1 tablet by Per G Tube route once daily. ), Disp: 90 tablet, Rfl: 3    Lab Results   Component Value Date    WBC 7.02 11/20/2020    HGB 9.9 (L) 11/20/2020    HCT 30.2 (L) 11/20/2020     (L) 11/20/2020    CHOL 83 (L) 11/18/2020    TRIG 57 11/18/2020    HDL 25 (L) 11/18/2020    ALT 11 11/19/2020    AST 10 11/19/2020     11/20/2020    K 4.1 11/20/2020     11/20/2020    CREATININE 5.3 (H) 11/20/2020    BUN 29 (H) 11/20/2020    CO2 24 11/20/2020    TSH 1.003 11/17/2020    PSA 5.6 (H) 07/26/2018    INR 1.1 11/18/2020    HGBA1C 5.1 11/18/2020       Review of Systems   Constitutional: Positive for activity change. Negative for chills and fever.   HENT: Negative for congestion.    Respiratory: Negative for shortness of breath.    Cardiovascular: Negative for chest pain and palpitations.   Gastrointestinal: Negative for constipation, diarrhea, nausea and vomiting.   Genitourinary: Positive for decreased urine volume.        Patient on hemodialysis.   Musculoskeletal: Positive for gait problem.        Patient uses a wheelchair. Caregiver states he can take steps with maximum assistance   Skin: Negative for rash and wound.   Neurological: Positive for weakness.   Psychiatric/Behavioral: Positive for agitation  and confusion. Negative for behavioral problems. The patient is nervous/anxious.    All other systems reviewed and are negative.      Objective:      Physical Exam  Constitutional:       General: He is not in acute distress.     Appearance: Normal appearance. He is well-developed. He is not diaphoretic.      Comments: Patient seated in recliner in no apparent distress   HENT:      Head: Normocephalic and atraumatic.   Pulmonary:      Effort: Pulmonary effort is normal. No respiratory distress.   Skin:     Findings: No rash.   Neurological:      Mental Status: He is alert and oriented to person, place, and time.      Cranial Nerves: No cranial nerve deficit.   Psychiatric:         Behavior: Behavior normal.         Assessment:       1. Acute cystitis with hematuria    2. PEG (percutaneous endoscopic gastrostomy) status    3. Hypertension due to end stage renal disease caused by type 2 diabetes mellitus, on dialysis    4. History of stroke    5. Hemiplegia and hemiparesis following cerebral infarction affecting right dominant side    6. Hemiparesis, aphasia, and dysphagia as late effects of stroke    7. Essential hypertension    8. ESRD (end stage renal disease) on dialysis    9. Paroxysmal atrial fibrillation        Plan:     Diagnoses and all orders for this visit:    Acute cystitis with hematuria  Symptoms resolved  Antibiotics completed  PEG (percutaneous endoscopic gastrostomy) status  -     Ambulatory referral/consult to [facility]; Future    Hypertension due to end stage renal disease caused by type 2 diabetes mellitus, on dialysis  Stable  Continue current medication  History of stroke  Follow up with dr. Ramirez as referred  Hemiplegia and hemiparesis following cerebral infarction affecting right dominant side    Hemiparesis, aphasia, and dysphagia as late effects of stroke  -     Ambulatory referral/consult to [facility]; Future    Essential hypertension    ESRD (end stage renal disease) on  dialysis  Continue dialysis  Paroxysmal atrial fibrillation  Continue current medications    Patient referred to [facility] clinic.

## 2020-12-01 NOTE — TELEPHONE ENCOUNTER
Patient referred to [facility]. Please assist patient in scheduling appt with Dr. Christopher solano.

## 2020-12-07 NOTE — TELEPHONE ENCOUNTER
----- Message from Li Quiles sent at 12/7/2020  8:43 AM CST -----  Contact: pt daughter  Type: Needs Medical Advice    Who Called: pt daughter-Tonya  Best Call Back Number: [phone]  Additional Info-pt daughter is requesting to reschedule today's nurses visit until tomorrow if possible.  Pt has another apt today.  Please Advise-Thank you~

## 2020-12-08 NOTE — PROGRESS NOTES
"Primary Care Provider Appointment    Subjective:      Patient ID: Micheal Hunt is a 80 y.o. male. 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     Pt recently discharged from hospital for facial droop in November, workup was negative for cause.   Family and cargiver is concerned because of the weekend -on Saturday, pt starting to get sick - more altered with mental status - taking more naps and actually slept most of day on Sunday, he was having runny nose and coughing. More anxiety.   Did well during dialysis yesterday  as far as caregiver knows     Usually this happens when he does not feel good. Pt denies any fever.     Has home health with [facility] since 2019 after having stroke resuling in hemiparesis  and his nurse reported hearing some "crackles" in his chest.      At baseline, Richa says Mr. Burton calls her  his mom, wife, sister   She states that he had dementia before the stroke., she says that you  cannot trust what he is saying - can only gauge by his behavior. However, pt able to ansiwer questions appropriately today.     Pt reports that eats good.  Tonya his daughter  lives next door.   Has Maya Chaudhry, and Sulma as caregivers ( 12 hours shifts), he is at home alone at night. Has cameras in his room that his daugther views, he usually sleeps all night.      He does have a feeding tube however they do feed him solid food despite having dysphagia - cut food in small amounts. Pt does not like feeding tube, Richa thinks that this causes him pain.  Uses Nupro and almond milk.   There has been some drainage and blood around his peg site.     Renal failure since  2018, goes to dialysis MWF in [facility] in [facility]. Noted hx of diabetes in the chart, caregiver is unaware " of diagnosis. She does report that pt has lost a significant amount of weight after his hospitalization/illness last year.   Seizure occurred in  March 2020     Had a fracture in the right foot and wearing boot  - not aware how it happened.   He does have history of gout - pt reprots no current toe pain.     Sees [facility] - has appt this Thursday.    Reports sundowners are horrible, other caregivers wants something to help with anxiety.   Says zoloft will make him somnolent. Reports anxiety 1-2 a week.     [facility]     Of note, does take Lasix  40 mg on non HD days, continues to have some urine output but not on command.       Past Surgical History:   Procedure Laterality Date    APPENDECTOMY      CARDIAC CATHETERIZATION  2012    LIMA to LAD patent, SVG to RCA    COLONOSCOPY  2011    COLONOSCOPY N/A 5/3/2018    Procedure: COLONOSCOPY;  Surgeon: Messi Harris MD;  Location: [facility];  Service: Endoscopy;  Laterality: N/A;    COLONOSCOPY N/A 8/18/2020    Procedure: COLONOSCOPY;  Surgeon: Messi Harris MD;  Location: [facility];  Service: Endoscopy;  Laterality: N/A;    CORONARY ARTERY BYPASS GRAFT  4/2007    x 2 [place]    ESOPHAGOGASTRODUODENOSCOPY N/A 8/17/2020    Procedure: EGD (ESOPHAGOGASTRODUODENOSCOPY);  Surgeon: Eben Soto MD;  Location: [facility];  Service: Endoscopy;  Laterality: N/A;    ESOPHAGOGASTRODUODENOSCOPY N/A 11/5/2020    Procedure: EGD (ESOPHAGOGASTRODUODENOSCOPY)(PEG removal and Arturo placement;  Surgeon: Messi Harris MD;  Location: [facility];  Service: Endoscopy;  Laterality: N/A;    INSERTION OR REPLACEMENT OF PERCUTANEOUS ENDOSCOPIC JEJUNOSTOMY TUBE N/A 11/5/2020    Procedure: INSERTION OR REPLACEMENT, JEJUNOSTOMY TUBE, PERCUTANEOUS, ENDOSCOPIC;  Surgeon: Messi Harris MD;  Location: [facility];  Service: Endoscopy;  Laterality: N/A;    JOINT REPLACEMENT      left knee total replacement  X 3    mid leftt finger      from a cactuss    MOLE  REMOVAL  2016    rotative cuff      no rotative cuffs on bilat shoulders has pins     SHOULDER SURGERY      shoulder surgery bilat  RIGHT X 4; LEFT X 3       Past Medical History:   Diagnosis Date    Abnormal cardiovascular stress test 10/2/2012    Acute on chronic diastolic congestive heart failure 3/18/2019    NICK (acute kidney injury) 7/29/2016    Allergy     Anemia, mild 12/15/2014    Arthritis     Gout    Atrial fibrillation 03/2019    Benign essential HTN 3/27/2012    BMI 29.0-29.9,adult 5/10/2018    BPH (benign prostatic hyperplasia)     BPH (benign prostatic hyperplasia)     CAD (coronary artery disease) 2006    Carotid artery occlusion     60-70% FROYLAN, LICA < 50%    Cellulitis of right arm 3/28/2019    Cerebrovascular accident (CVA) due to occlusion of left posterior cerebral artery 3/24/2019    Chronic kidney disease     due to ibuprofen    Colon polyp     CRF (chronic renal failure), stage 5     Diabetes mellitus     Diverticulitis, 2005, 2015 3/19/2015    Diverticulosis     Encephalopathy 3/28/2019    Enteritis 10/21/2018    ESRD (end stage renal disease) on dialysis     Gastritis     Gastrointestinal hemorrhage 8/16/2020    GERD (gastroesophageal reflux disease)     Gout     Groin hematoma 3/29/2019    History of colon polyps 5/3/2018    HTN (hypertension) 3/27/2012    Hyperlipidemia     Hyperlipidemia     LLL pneumonia 6/14/2018    LVH (left ventricular hypertrophy)     Mesenteric ischemia     Murmur, cardiac 3/27/2012    NSTEMI (non-ST elevated myocardial infarction) 3/20/2019    NSVT (nonsustained ventricular tachycardia) 3/12/2019    GRICELDA (obstructive sleep apnea)     DOES NOT USE A MACHINE    Pancreatitis 9/22/2020    Septic shock 8/22/2020    Sigmoid diverticulitis, 3 episodes, 2005, 2015, 2018 2/25/2018    Sinus problem     Sprain of right wrist 5/14/2019    Stroke 03/2019    acute left PCA    Syncope and collapse     Type 2 diabetes mellitus with  renal complication 3/18/2019    UTI (urinary tract infection) 2/16/2012     Dx updated per 2019 IMO Load       Social History     Socioeconomic History    Marital status:      Spouse name: Not on file    Number of children: Not on file    Years of education: Not on file    Highest education level: Not on file   Occupational History    Occupation: retired    Social Needs    Financial resource strain: Somewhat hard    Food insecurity     Worry: Never true     Inability: Never true    Transportation needs     Medical: Yes     Non-medical: Yes   Tobacco Use    Smoking status: Never Smoker    Smokeless tobacco: Never Used   Substance and Sexual Activity    Alcohol use: No     Frequency: Never     Drinks per session: Patient refused     Binge frequency: Never    Drug use: No    Sexual activity: Not Currently   Lifestyle    Physical activity     Days per week: Not on file     Minutes per session: 0 min    Stress: Not at all   Relationships    Social connections     Talks on phone: Three times a week     Gets together: Once a week     Attends Taoism service: Not on file     Active member of club or organization: No     Attends meetings of clubs or organizations: Never     Relationship status:    Other Topics Concern    Not on file   Social History Narrative    Lives alone on farm; daughter nearby - 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   Constitutional:       General: He is not in acute distress.     Appearance: He is normal weight. He  is not ill-appearing, toxic-appearing or diaphoretic.      Comments: Chronically ill in wheelchair    HENT:      Head: Normocephalic.   Eyes:      Conjunctiva/sclera: Conjunctivae normal.   Cardiovascular:      Rate and Rhythm: Normal rate and regular rhythm.      Pulses:           Dorsalis pedis pulses are 1+ on the right side and 1+ on the left side.        Posterior tibial pulses are 1+ on the right side and 1+ on the left side.      Heart sounds: Normal heart sounds.   Pulmonary:      Effort: No respiratory distress.      Breath sounds: No wheezing or rales.      Comments: Decreased BS throughout, pt not able to give good effort   Abdominal:      General: Abdomen is flat.      Palpations: Abdomen is soft.      Tenderness: There is no abdominal tenderness. There is no guarding.   Musculoskeletal:      Right foot: Decreased range of motion. No deformity, bunion, Charcot foot, foot drop or prominent metatarsal heads.      Left foot: Normal range of motion. No deformity, bunion, Charcot foot, foot drop or prominent metatarsal heads.   Feet:      Right foot:      Protective Sensation: 10 sites tested. 0 sites sensed.      Skin integrity: Skin integrity normal.      Toenail Condition: Right toenails are normal.      Left foot:      Protective Sensation: 10 sites tested. 10 sites sensed.      Skin integrity: Skin integrity normal.      Toenail Condition: Left toenails are normal.   Skin:     General: Skin is warm and dry.   Neurological:      Mental Status: He is alert.      Cranial Nerves: Cranial nerve deficit present.      Sensory: Sensory deficit present.      Comments: Right sided hemiparesis   Able to answer yes and no questions              Lab Results   Component Value Date    WBC 7.02 11/20/2020    HGB 9.9 (L) 11/20/2020    HCT 30.2 (L) 11/20/2020     (L) 11/20/2020    CHOL 83 (L) 11/18/2020    TRIG 57 11/18/2020    HDL 25 (L) 11/18/2020    ALT 11 11/19/2020    AST 10 11/19/2020     11/20/2020     K 4.1 11/20/2020     11/20/2020    CREATININE 5.3 (H) 11/20/2020    BUN 29 (H) 11/20/2020    CO2 24 11/20/2020    TSH 1.003 11/17/2020    PSA 5.6 (H) 07/26/2018    INR 1.1 11/18/2020    HGBA1C 5.1 11/18/2020       Current Outpatient Medications on File Prior to Visit   Medication Sig Dispense Refill    allopurinoL (ZYLOPRIM) 100 MG tablet 100 mg by FEEDING TUBE route once daily.      apixaban (ELIQUIS) 2.5 mg Tab 2.5 mg by FEEDING TUBE route 2 (two) times daily.      aspirin (ECOTRIN) 81 MG EC tablet 81 mg by FEEDING TUBE route once daily.      atorvastatin (LIPITOR) 40 MG tablet 1 tablet (40 mg total) by Per G Tube route once daily. 90 tablet 4    carvediloL (COREG) 12.5 MG tablet Take 6.5 tablets (81.25 mg total) by mouth 2 (two) times daily. (Patient taking differently: 12.5 mg by Per G Tube route once daily. ) 90 tablet 4    levETIRAcetam (KEPPRA) 100 mg/mL Soln 7.5 ML BY PEG DAILY 120 mL 5    nut.tx.imp.renal fxn,lac-reduc (NEPRO CARB STEADY ORAL) 240 mLs by FEEDING TUBE route 2 (two) times daily. 6 hours apart AM and afternoon      nut.tx.imp.renal fxn,lac-reduc (NEPRO CARB STEADY ORAL) 1,000 mLs by FEEDING TUBE route every evening.       senna-docusate 8.6-50 mg (PERICOLACE) 8.6-50 mg per tablet 1 tablet by Per G Tube route once daily. 30 tablet 11    vit b cmplx 3-fa-vit c-biotin 1- mg-mg-mcg (NEPHRO-EMMETT RX) 1- mg-mg-mcg Tab Take 1 tablet by mouth once daily. (Patient taking differently: 1 tablet by Per G Tube route once daily. ) 90 tablet 3    [DISCONTINUED] levETIRAcetam (KEPPRA) 100 mg/mL Soln 7.5 ml by PEG daily (Patient taking differently: 750 mg by Per G Tube route once daily. 7.5 ml by PEG daily) 120 mL 5     No current facility-administered medications on file prior to visit.          Assessment:   80 y.o. male with multiple co-morbid illnesses here to establish care with new PCP and continue work-up of chronic issues notably With hx of CVA resulting in right sided  hemiparesis, dysphagia s/p peg tube placement, dementia, HTN, ESRD on HD MWF, atrial fibrillation      Plan:     Problem List Items Addressed This Visit        Neuro    Hemiparesis, aphasia, and dysphagia as late effects of stroke     Focal deficits appears to be stable from prior encounters          Episode of transient neurologic symptoms     Pt in clinic per caregiver is at his baseline. He is alert, afebrile. No signs of infection on exam.   Not able to fully hear breath sounds due to poor effort - will obtain CXR.     PeG tube site is not concerning and no skin breakdown on his feet     If CXR negative, will obtain repeat urine studies.          Vascular dementia with behavior disturbance      has diagnosis of dementia per chart review and by caregiver.   Will do cognitve screening at next visit to get pt baseline     Stressed behavioral modification from caregivers to lessen frequency and potentially severity of episodes. Given handout for cargivers and family to review on how to handle difficult behaviors.   Explained that there are no FDA approved medications and that most people respond to behavioral therapies best.     Discussed would only use medications if situation can turn dangerous for patient or caregiver.   Will try buspar 5 mg prn as needed             Pulmonary    COPD with asthma     Obtain imaging with history of complaint             Cardiac/Vascular    Chronic systolic heart failure     Pt has not followed up with cardiology since 2018. His most recent echo shows decline of EF to 30%.   BP close to goal, no need to increase medications.   No evidence of euvolemia that is obvious on exam, will get CXR to make sure not decompensated.   Will need to review goals of care.          Aortic stenosis    Persistent atrial fibrillation     On BB and eliquis               GI    PEG (percutaneous endoscopic gastrostomy) status     Discussed with caregiver that since pt is eating meals does he even need  PEG tube?   Will have home health do an assessment   I would d/c if pt eating and if it is bothersome to patient to have.   Feeding tube will not prevent aspiration          Relevant Orders    SUBSEQUENT HOME HEALTH ORDERS       Orthopedic    Gout, arthritis     No active signs of gout today   Recommended cherry juice or dried cherries to help reduce gout flares               Other    Wheelchair dependence     Secondary to effects of CVA in 2019            Other Visit Diagnoses     Cough    -  Primary    Relevant Orders    X-Ray Chest 1 View (Completed)    Urinary frequency        Relevant Orders    SUBSEQUENT HOME HEALTH ORDERS    Urine culture    Cognitive communication disorder              Health Maintenance       Date Due Completion Date    Foot Exam 09/27/1949 ---    Shingles Vaccine (1 of 2) 09/27/1989 ---    Eye Exam 09/18/2020 9/18/2019    Hemoglobin A1c 02/18/2021 11/18/2020    Lipid Panel 11/18/2021 11/18/2020    Colonoscopy 08/18/2023 8/18/2020    Override on 3/4/2011: Done    TETANUS VACCINE 07/05/2028 7/5/2018          Follow up in about 5 weeks (around 1/12/2021) for cognitive screening, advanced care goals, anxiety . Total face-to-face time was 60 min, greater than 50% of this was spent on counseling and coordination of care. The following issues were discussed: dementia, altered mental status, CHF, peg tube status, gout, anxiety      Ling White MD  Internal Medicine- Geriatrics  [facility] [place]

## 2020-12-08 NOTE — TELEPHONE ENCOUNTER
Spoke with care giver, pt with chills, oral temp 97, asked to take axillary, temp 99.9.  Discussed with Dr. White, Cipro RX sent to [facility], give Tylenol for fever.  Information relayed, care giver voiced understanding

## 2020-12-08 NOTE — LETTER
December 9, 2020      Viktoria Chauhan PA-C  [address] [address]           [facility] [place] [facility]  [address]  [facility] [facility] [address]  Phone: [phone]  Fax: [phone]          Patient: Micheal Hunt   MR Number: [id number]   YOB: 1940   Date of Visit: 12/8/2020       Dear Viktoria Chauhan:    Thank you for referring Micheal Hunt to me for evaluation. Attached you will find relevant portions of my assessment and plan of care.    If you have questions, please do not hesitate to call me. I look forward to following Micheal Hunt along with you.    Sincerely,    Ling White MD    Enclosure  CC:  No Recipients    If you would like to receive this communication electronically, please contact [email or web address] or [phone] to request more information on [employer] access.    For providers and/or their staff who would like to refer a patient to [facility], please contact us through our one-stop-shop provider referral line, [facility], at [phone].    If you feel you have received this communication in error or would no longer like to receive these types of communications, please e-mail [email or web address]

## 2020-12-08 NOTE — PATIENT INSTRUCTIONS
Chest xray today to see source of decreased breath sounds  Will call with results     Try buspar as needed for significant agitation but FIRST would do behavioral interventions first - given handout on this     GOUT - try cherry juice a few times a week or dried cherries several times a week to help with gout prevention.

## 2020-12-09 PROBLEM — S63.501A SPRAIN OF RIGHT WRIST: Status: RESOLVED | Noted: 2019-05-14 | Resolved: 2020-01-01

## 2020-12-09 PROBLEM — Z99.3 WHEELCHAIR DEPENDENCE: Status: ACTIVE | Noted: 2020-01-01

## 2020-12-09 PROBLEM — I47.29 NSVT (NONSUSTAINED VENTRICULAR TACHYCARDIA): Status: RESOLVED | Noted: 2019-03-12 | Resolved: 2020-01-01

## 2020-12-09 PROBLEM — F01.518 VASCULAR DEMENTIA WITH BEHAVIOR DISTURBANCE: Status: ACTIVE | Noted: 2020-01-01

## 2020-12-09 PROBLEM — I63.532 CEREBROVASCULAR ACCIDENT (CVA) DUE TO OCCLUSION OF LEFT POSTERIOR CEREBRAL ARTERY: Status: RESOLVED | Noted: 2019-03-24 | Resolved: 2020-01-01

## 2020-12-09 PROBLEM — I21.4 NSTEMI (NON-ST ELEVATED MYOCARDIAL INFARCTION): Status: RESOLVED | Noted: 2019-03-20 | Resolved: 2020-01-01

## 2020-12-09 PROBLEM — K52.9 ENTERITIS: Status: RESOLVED | Noted: 2018-10-21 | Resolved: 2020-01-01

## 2020-12-09 PROBLEM — K85.90 PANCREATITIS: Status: RESOLVED | Noted: 2020-01-01 | Resolved: 2020-01-01

## 2020-12-09 PROBLEM — E11.29 TYPE 2 DIABETES MELLITUS WITH RENAL COMPLICATION: Status: RESOLVED | Noted: 2019-03-18 | Resolved: 2020-01-01

## 2020-12-09 PROBLEM — E66.01 MORBID (SEVERE) OBESITY DUE TO EXCESS CALORIES: Status: RESOLVED | Noted: 2020-01-27 | Resolved: 2020-01-01

## 2020-12-09 PROBLEM — R10.84 GENERALIZED ABDOMINAL PAIN: Status: RESOLVED | Noted: 2018-06-14 | Resolved: 2020-01-01

## 2020-12-09 PROBLEM — E87.5 HYPERKALEMIA: Status: RESOLVED | Noted: 2020-01-01 | Resolved: 2020-01-01

## 2020-12-09 PROBLEM — G93.6 CYTOTOXIC CEREBRAL EDEMA: Status: RESOLVED | Noted: 2019-04-01 | Resolved: 2020-01-01

## 2020-12-09 PROBLEM — E87.1 HYPONATREMIA: Status: RESOLVED | Noted: 2018-11-06 | Resolved: 2020-01-01

## 2020-12-09 PROBLEM — L03.113 CELLULITIS OF RIGHT ARM: Status: RESOLVED | Noted: 2019-03-28 | Resolved: 2020-01-01

## 2020-12-09 PROBLEM — G93.40 ENCEPHALOPATHY: Status: RESOLVED | Noted: 2019-03-28 | Resolved: 2020-01-01

## 2020-12-09 PROBLEM — R65.21 SEPTIC SHOCK: Status: RESOLVED | Noted: 2020-01-01 | Resolved: 2020-01-01

## 2020-12-09 PROBLEM — E83.51 HYPOCALCEMIA: Status: RESOLVED | Noted: 2018-11-06 | Resolved: 2020-01-01

## 2020-12-09 PROBLEM — A41.9 SEPTIC SHOCK: Status: RESOLVED | Noted: 2020-01-01 | Resolved: 2020-01-01

## 2020-12-09 PROBLEM — M79.642 PAIN OF LEFT HAND: Status: RESOLVED | Noted: 2019-02-28 | Resolved: 2020-01-01

## 2020-12-09 PROBLEM — K92.2 GASTROINTESTINAL HEMORRHAGE: Status: RESOLVED | Noted: 2020-01-01 | Resolved: 2020-01-01

## 2020-12-09 PROBLEM — I50.33 ACUTE ON CHRONIC DIASTOLIC CONGESTIVE HEART FAILURE: Status: RESOLVED | Noted: 2019-03-18 | Resolved: 2020-01-01

## 2020-12-09 PROBLEM — S30.1XXA GROIN HEMATOMA: Status: RESOLVED | Noted: 2019-03-29 | Resolved: 2020-01-01

## 2020-12-09 PROBLEM — K57.32 SIGMOID DIVERTICULITIS: Status: RESOLVED | Noted: 2018-02-25 | Resolved: 2020-01-01

## 2020-12-09 NOTE — ASSESSMENT & PLAN NOTE
No active signs of gout today   Recommended cherry juice or dried cherries to help reduce gout flares

## 2020-12-09 NOTE — ASSESSMENT & PLAN NOTE
Pt reportdely has diagnosis of dementia per chart review and by caregiver.   Will do cognitve screening at next visit

## 2020-12-09 NOTE — ASSESSMENT & PLAN NOTE
Discussed with caregiver that since pt is eating meals does he even need PEG tube?   Will have home health do an assessment   I would d/c if pt eating and if it is bothersome to patient to have.   Feeding tube will not prevent aspiration

## 2020-12-09 NOTE — CONSULTS
"[facility]  Adult Nutrition  Consult Note    RD received consult regarding "discharge". Noted patient transferred here from [facility]. He was seen yesterday by that RD and educated on diabetic/renal diet. Patient currently on Diabetic/Renal diet. RD to complete full assessment if patient remains admitted 10 days. Please consult if needed.  " Severe protein-calorie malnutrition

## 2020-12-09 NOTE — ASSESSMENT & PLAN NOTE
Pt has not followed up with cardiology since 2018. His most recent echo shows decline of EF to 30%.   BP close to goal, no need to increase medications.   No evidence of euvolemia that is obvious on exam, will get CXR to make sure not decompensated.   Will need to review goals of care.

## 2020-12-09 NOTE — ASSESSMENT & PLAN NOTE
Pt in clinic per caregiver is at his baseline. He is alert, afebrile. No signs of infection on exam.   Not able to fully hear breath sounds due to poor effort - will obtain CXR.     PeG tube site is not concerning and no skin breakdown on his feet     If CXR negative, will obtain repeat urine studies.

## 2020-12-09 NOTE — ASSESSMENT & PLAN NOTE
has diagnosis of dementia per chart review and by caregiver.   Will do cognitve screening at next visit to get pt baseline     Stressed behavioral modification from caregivers to lessen frequency and potentially severity of episodes. Given handout for cargivers and family to review on how to handle difficult behaviors.   Explained that there are no FDA approved medications and that most people respond to behavioral therapies best.     Discussed would only use medications if situation can turn dangerous for patient or caregiver.   Will try buspar 5 mg prn as needed

## 2020-12-11 NOTE — TELEPHONE ENCOUNTER
----- Message from Ling White MD sent at 12/11/2020  9:32 AM CST -----  Urine culture negative. Please let us know if there is any more mental status or personality changes, workup so far for infection has been negative.

## 2020-12-15 NOTE — TELEPHONE ENCOUNTER
Spoke with [facility], they are not able to do a swallow study at home.  This will need to be done as outpatient.

## 2020-12-15 NOTE — TELEPHONE ENCOUNTER
When the nurse get there, definitely make her aware and have her flush it to see if it works. Was this pulled at all? Any pain?     Has he had swallow evaluation from speech therapy to see if he even needs this now?     He may need imaging to look at positioning of tube but I would see what GI would say about this.

## 2020-12-28 NOTE — TELEPHONE ENCOUNTER
I do not know of anyone off hand in [place]  - can someone call the ophthalmology and ask if they know of a neuro ophthalmologist that rotates here.     I found Dr. Saul Ingram but I think he may be at [facility] and [place]

## 2021-01-01 ENCOUNTER — PATIENT OUTREACH (OUTPATIENT)
Dept: EMERGENCY MEDICINE | Facility: HOSPITAL | Age: 81
End: 2021-01-01

## 2021-01-01 ENCOUNTER — PATIENT MESSAGE (OUTPATIENT)
Dept: PRIMARY CARE CLINIC | Facility: CLINIC | Age: 81
End: 2021-01-01

## 2021-01-01 ENCOUNTER — EXTERNAL HOME HEALTH (OUTPATIENT)
Dept: HOME HEALTH SERVICES | Facility: HOSPITAL | Age: 81
End: 2021-01-01
Payer: MEDICARE

## 2021-01-01 ENCOUNTER — HOSPITAL ENCOUNTER (EMERGENCY)
Facility: HOSPITAL | Age: 81
Discharge: HOME | End: 2021-01-26
Attending: EMERGENCY MEDICINE
Payer: MEDICARE

## 2021-01-01 ENCOUNTER — TELEPHONE (OUTPATIENT)
Dept: PRIMARY CARE CLINIC | Facility: CLINIC | Age: 81
End: 2021-01-01

## 2021-01-01 ENCOUNTER — HOSPITAL ENCOUNTER (OUTPATIENT)
Facility: HOSPITAL | Age: 81
Discharge: HOSPICE | End: 2021-03-19
Attending: EMERGENCY MEDICINE | Admitting: HOSPITALIST
Payer: MEDICARE

## 2021-01-01 ENCOUNTER — TELEPHONE (OUTPATIENT)
Dept: MEDSURG UNIT | Facility: HOSPITAL | Age: 81
End: 2021-01-01

## 2021-01-01 ENCOUNTER — DOCUMENT SCAN (OUTPATIENT)
Dept: HOME HEALTH SERVICES | Facility: HOSPITAL | Age: 81
End: 2021-01-01
Payer: MEDICARE

## 2021-01-01 ENCOUNTER — HOSPITAL ENCOUNTER (EMERGENCY)
Facility: HOSPITAL | Age: 81
Discharge: HOME | End: 2021-03-16
Attending: EMERGENCY MEDICINE
Payer: MEDICARE

## 2021-01-01 ENCOUNTER — OFFICE VISIT (OUTPATIENT)
Dept: PRIMARY CARE CLINIC | Facility: CLINIC | Age: 81
End: 2021-01-01
Payer: MEDICARE

## 2021-01-01 ENCOUNTER — PATIENT OUTREACH (OUTPATIENT)
Dept: ADMINISTRATIVE | Facility: CLINIC | Age: 81
End: 2021-01-01

## 2021-01-01 ENCOUNTER — TELEPHONE (OUTPATIENT)
Dept: FAMILY MEDICINE | Facility: CLINIC | Age: 81
End: 2021-01-01

## 2021-01-01 ENCOUNTER — HOSPITAL ENCOUNTER (INPATIENT)
Facility: HOSPITAL | Age: 81
LOS: 1 days | Discharge: HOME HEALTH | DRG: 291 | End: 2021-03-12
Attending: EMERGENCY MEDICINE | Admitting: INTERNAL MEDICINE
Payer: MEDICARE

## 2021-01-01 ENCOUNTER — OUTPATIENT CASE MANAGEMENT (OUTPATIENT)
Dept: ADMINISTRATIVE | Facility: OTHER | Age: 81
End: 2021-01-01

## 2021-01-01 ENCOUNTER — PATIENT MESSAGE (OUTPATIENT)
Dept: OPHTHALMOLOGY | Facility: CLINIC | Age: 81
End: 2021-01-01

## 2021-01-01 ENCOUNTER — PATIENT OUTREACH (OUTPATIENT)
Dept: HOME HEALTH SERVICES | Facility: HOSPITAL | Age: 81
End: 2021-01-01

## 2021-01-01 ENCOUNTER — NURSE TRIAGE (OUTPATIENT)
Dept: ADMINISTRATIVE | Facility: CLINIC | Age: 81
End: 2021-01-01

## 2021-01-01 ENCOUNTER — DOCUMENTATION ONLY (OUTPATIENT)
Dept: FAMILY MEDICINE | Facility: CLINIC | Age: 81
End: 2021-01-01

## 2021-01-01 ENCOUNTER — HOSPITAL ENCOUNTER (INPATIENT)
Facility: HOSPITAL | Age: 81
LOS: 3 days | DRG: 951 | End: 2021-03-22
Attending: HOSPITALIST | Admitting: HOSPITALIST
Payer: OTHER MISCELLANEOUS

## 2021-01-01 VITALS
DIASTOLIC BLOOD PRESSURE: 62 MMHG | RESPIRATION RATE: 18 BRPM | SYSTOLIC BLOOD PRESSURE: 138 MMHG | HEART RATE: 80 BPM | BODY MASS INDEX: 24.66 KG/M2 | OXYGEN SATURATION: 97 % | TEMPERATURE: 97 F | WEIGHT: 186.06 LBS | HEIGHT: 73 IN

## 2021-01-01 VITALS
SYSTOLIC BLOOD PRESSURE: 161 MMHG | RESPIRATION RATE: 20 BRPM | OXYGEN SATURATION: 100 % | DIASTOLIC BLOOD PRESSURE: 89 MMHG | BODY MASS INDEX: 24.25 KG/M2 | WEIGHT: 183 LBS | TEMPERATURE: 98 F | HEART RATE: 74 BPM | HEIGHT: 73 IN

## 2021-01-01 VITALS
WEIGHT: 186.06 LBS | OXYGEN SATURATION: 95 % | HEIGHT: 73 IN | DIASTOLIC BLOOD PRESSURE: 82 MMHG | SYSTOLIC BLOOD PRESSURE: 140 MMHG | HEART RATE: 61 BPM | BODY MASS INDEX: 24.66 KG/M2 | RESPIRATION RATE: 18 BRPM | TEMPERATURE: 98 F

## 2021-01-01 VITALS
WEIGHT: 186.06 LBS | RESPIRATION RATE: 20 BRPM | DIASTOLIC BLOOD PRESSURE: 86 MMHG | BODY MASS INDEX: 24.55 KG/M2 | SYSTOLIC BLOOD PRESSURE: 163 MMHG | OXYGEN SATURATION: 97 % | HEART RATE: 79 BPM | TEMPERATURE: 98 F

## 2021-01-01 VITALS
DIASTOLIC BLOOD PRESSURE: 50 MMHG | HEART RATE: 70 BPM | TEMPERATURE: 99 F | SYSTOLIC BLOOD PRESSURE: 101 MMHG | OXYGEN SATURATION: 100 % | RESPIRATION RATE: 18 BRPM

## 2021-01-01 VITALS
RESPIRATION RATE: 16 BRPM | BODY MASS INDEX: 22.85 KG/M2 | WEIGHT: 172.38 LBS | DIASTOLIC BLOOD PRESSURE: 78 MMHG | OXYGEN SATURATION: 94 % | TEMPERATURE: 98 F | HEIGHT: 73 IN | HEART RATE: 82 BPM | SYSTOLIC BLOOD PRESSURE: 140 MMHG

## 2021-01-01 DIAGNOSIS — I69.359 HEMIPARESIS, APHASIA, AND DYSPHAGIA AS LATE EFFECTS OF STROKE: ICD-10-CM

## 2021-01-01 DIAGNOSIS — R07.9 CHEST PAIN: ICD-10-CM

## 2021-01-01 DIAGNOSIS — I12.0 HYPERTENSION DUE TO END STAGE RENAL DISEASE CAUSED BY TYPE 2 DIABETES MELLITUS, ON DIALYSIS: ICD-10-CM

## 2021-01-01 DIAGNOSIS — I73.9 PVD (PERIPHERAL VASCULAR DISEASE): ICD-10-CM

## 2021-01-01 DIAGNOSIS — I48.19 PERSISTENT ATRIAL FIBRILLATION: ICD-10-CM

## 2021-01-01 DIAGNOSIS — R04.0 EPISTAXIS: Primary | ICD-10-CM

## 2021-01-01 DIAGNOSIS — N25.81 SECONDARY HYPERPARATHYROIDISM: ICD-10-CM

## 2021-01-01 DIAGNOSIS — I50.9 CHF (CONGESTIVE HEART FAILURE): ICD-10-CM

## 2021-01-01 DIAGNOSIS — R41.82 ALTERED MENTAL STATUS, UNSPECIFIED ALTERED MENTAL STATUS TYPE: Primary | ICD-10-CM

## 2021-01-01 DIAGNOSIS — N18.6 ESRD (END STAGE RENAL DISEASE) ON DIALYSIS: ICD-10-CM

## 2021-01-01 DIAGNOSIS — I27.20 PULMONARY HYPERTENSION: ICD-10-CM

## 2021-01-01 DIAGNOSIS — Z99.2 HYPERTENSION DUE TO END STAGE RENAL DISEASE CAUSED BY TYPE 2 DIABETES MELLITUS, ON DIALYSIS: ICD-10-CM

## 2021-01-01 DIAGNOSIS — Z93.1 PEG (PERCUTANEOUS ENDOSCOPIC GASTROSTOMY) STATUS: ICD-10-CM

## 2021-01-01 DIAGNOSIS — R56.9 SEIZURE: ICD-10-CM

## 2021-01-01 DIAGNOSIS — Z91.89 AT HIGH RISK FOR PRESSURE INJURY OF SKIN: Primary | ICD-10-CM

## 2021-01-01 DIAGNOSIS — I50.22 CHRONIC SYSTOLIC HEART FAILURE: ICD-10-CM

## 2021-01-01 DIAGNOSIS — N18.6 HYPERTENSION DUE TO END STAGE RENAL DISEASE CAUSED BY TYPE 2 DIABETES MELLITUS, ON DIALYSIS: ICD-10-CM

## 2021-01-01 DIAGNOSIS — I67.89 ISCHEMIC ENCEPHALOPATHY: ICD-10-CM

## 2021-01-01 DIAGNOSIS — R00.1 BRADYCARDIA: ICD-10-CM

## 2021-01-01 DIAGNOSIS — R41.0 DELIRIUM: Primary | ICD-10-CM

## 2021-01-01 DIAGNOSIS — J45.901 MILD ASTHMA WITH ACUTE EXACERBATION, UNSPECIFIED WHETHER PERSISTENT: ICD-10-CM

## 2021-01-01 DIAGNOSIS — J44.89 COPD WITH ASTHMA: ICD-10-CM

## 2021-01-01 DIAGNOSIS — R41.82 ALTERED MENTAL STATE: ICD-10-CM

## 2021-01-01 DIAGNOSIS — F01.518 VASCULAR DEMENTIA WITH BEHAVIOR DISTURBANCE: ICD-10-CM

## 2021-01-01 DIAGNOSIS — N39.0 RECURRENT UTI: ICD-10-CM

## 2021-01-01 DIAGNOSIS — N30.00 ACUTE CYSTITIS WITHOUT HEMATURIA: Primary | ICD-10-CM

## 2021-01-01 DIAGNOSIS — E11.22 HYPERTENSION DUE TO END STAGE RENAL DISEASE CAUSED BY TYPE 2 DIABETES MELLITUS, ON DIALYSIS: ICD-10-CM

## 2021-01-01 DIAGNOSIS — K92.2 GI BLEED: ICD-10-CM

## 2021-01-01 DIAGNOSIS — R05.9 COUGH: ICD-10-CM

## 2021-01-01 DIAGNOSIS — I69.320 HEMIPARESIS, APHASIA, AND DYSPHAGIA AS LATE EFFECTS OF STROKE: ICD-10-CM

## 2021-01-01 DIAGNOSIS — N18.6 ESRD (END STAGE RENAL DISEASE): ICD-10-CM

## 2021-01-01 DIAGNOSIS — I69.391 HEMIPARESIS, APHASIA, AND DYSPHAGIA AS LATE EFFECTS OF STROKE: ICD-10-CM

## 2021-01-01 DIAGNOSIS — R55 NEAR SYNCOPE: ICD-10-CM

## 2021-01-01 DIAGNOSIS — R55 SYNCOPE: ICD-10-CM

## 2021-01-01 DIAGNOSIS — Z99.2 ESRD (END STAGE RENAL DISEASE) ON DIALYSIS: ICD-10-CM

## 2021-01-01 LAB
ABO + RH BLD: NORMAL
ALBUMIN SERPL BCP-MCNC: 2.8 G/DL (ref 3.5–5.2)
ALBUMIN SERPL BCP-MCNC: 2.9 G/DL (ref 3.5–5.2)
ALBUMIN SERPL BCP-MCNC: 3.2 G/DL (ref 3.5–5.2)
ALBUMIN SERPL BCP-MCNC: 3.2 G/DL (ref 3.5–5.2)
ALBUMIN SERPL BCP-MCNC: 3.3 G/DL (ref 3.5–5.2)
ALBUMIN SERPL BCP-MCNC: 3.4 G/DL (ref 3.5–5.2)
ALBUMIN SERPL BCP-MCNC: 3.5 G/DL (ref 3.5–5.2)
ALBUMIN SERPL BCP-MCNC: 3.6 G/DL (ref 3.5–5.2)
ALBUMIN SERPL BCP-MCNC: 3.7 G/DL (ref 3.5–5.2)
ALP SERPL-CCNC: 103 U/L (ref 55–135)
ALP SERPL-CCNC: 110 U/L (ref 55–135)
ALP SERPL-CCNC: 114 U/L (ref 55–135)
ALP SERPL-CCNC: 116 U/L (ref 55–135)
ALP SERPL-CCNC: 119 U/L (ref 55–135)
ALP SERPL-CCNC: 120 U/L (ref 55–135)
ALP SERPL-CCNC: 149 U/L (ref 55–135)
ALP SERPL-CCNC: 81 U/L (ref 55–135)
ALP SERPL-CCNC: 93 U/L (ref 55–135)
ALT SERPL W/O P-5'-P-CCNC: 112 U/L (ref 10–44)
ALT SERPL W/O P-5'-P-CCNC: 121 U/L (ref 10–44)
ALT SERPL W/O P-5'-P-CCNC: 13 U/L (ref 10–44)
ALT SERPL W/O P-5'-P-CCNC: 25 U/L (ref 10–44)
ALT SERPL W/O P-5'-P-CCNC: 31 U/L (ref 10–44)
ALT SERPL W/O P-5'-P-CCNC: 41 U/L (ref 10–44)
ALT SERPL W/O P-5'-P-CCNC: 48 U/L (ref 10–44)
ALT SERPL W/O P-5'-P-CCNC: 59 U/L (ref 10–44)
ALT SERPL W/O P-5'-P-CCNC: 91 U/L (ref 10–44)
ANION GAP SERPL CALC-SCNC: 10 MMOL/L (ref 8–16)
ANION GAP SERPL CALC-SCNC: 12 MMOL/L (ref 8–16)
ANION GAP SERPL CALC-SCNC: 13 MMOL/L (ref 8–16)
ANION GAP SERPL CALC-SCNC: 13 MMOL/L (ref 8–16)
ANION GAP SERPL CALC-SCNC: 15 MMOL/L (ref 8–16)
ANION GAP SERPL CALC-SCNC: 9 MMOL/L (ref 8–16)
APTT BLDCRRT: 27.6 SEC (ref 21–32)
APTT BLDCRRT: 27.8 SEC (ref 21–32)
AST SERPL-CCNC: 10 U/L (ref 10–40)
AST SERPL-CCNC: 19 U/L (ref 10–40)
AST SERPL-CCNC: 21 U/L (ref 10–40)
AST SERPL-CCNC: 22 U/L (ref 10–40)
AST SERPL-CCNC: 26 U/L (ref 10–40)
AST SERPL-CCNC: 48 U/L (ref 10–40)
AST SERPL-CCNC: 63 U/L (ref 10–40)
AST SERPL-CCNC: 64 U/L (ref 10–40)
AST SERPL-CCNC: 94 U/L (ref 10–40)
BACTERIA #/AREA URNS HPF: ABNORMAL /HPF
BACTERIA #/AREA URNS HPF: ABNORMAL /HPF
BACTERIA BLD CULT: NORMAL
BACTERIA BLD CULT: NORMAL
BACTERIA UR CULT: NO GROWTH
BACTERIA UR CULT: NORMAL
BASOPHILS # BLD AUTO: 0.02 K/UL (ref 0–0.2)
BASOPHILS # BLD AUTO: 0.02 K/UL (ref 0–0.2)
BASOPHILS # BLD AUTO: 0.03 K/UL (ref 0–0.2)
BASOPHILS # BLD AUTO: 0.03 K/UL (ref 0–0.2)
BASOPHILS # BLD AUTO: 0.04 K/UL (ref 0–0.2)
BASOPHILS # BLD AUTO: 0.05 K/UL (ref 0–0.2)
BASOPHILS # BLD AUTO: 0.06 K/UL (ref 0–0.2)
BASOPHILS NFR BLD: 0.2 % (ref 0–1.9)
BASOPHILS NFR BLD: 0.2 % (ref 0–1.9)
BASOPHILS NFR BLD: 0.4 % (ref 0–1.9)
BASOPHILS NFR BLD: 0.6 % (ref 0–1.9)
BASOPHILS NFR BLD: 0.7 % (ref 0–1.9)
BASOPHILS NFR BLD: 0.7 % (ref 0–1.9)
BASOPHILS NFR BLD: 0.8 % (ref 0–1.9)
BILIRUB SERPL-MCNC: 0.5 MG/DL (ref 0.1–1)
BILIRUB SERPL-MCNC: 0.6 MG/DL (ref 0.1–1)
BILIRUB SERPL-MCNC: 0.7 MG/DL (ref 0.1–1)
BILIRUB UR QL STRIP: ABNORMAL
BILIRUB UR QL STRIP: NEGATIVE
BLD GP AB SCN CELLS X3 SERPL QL: NORMAL
BNP SERPL-MCNC: 3872 PG/ML (ref 0–99)
BUN SERPL-MCNC: 21 MG/DL (ref 8–23)
BUN SERPL-MCNC: 23 MG/DL (ref 8–23)
BUN SERPL-MCNC: 24 MG/DL (ref 8–23)
BUN SERPL-MCNC: 36 MG/DL (ref 8–23)
BUN SERPL-MCNC: 37 MG/DL (ref 8–23)
BUN SERPL-MCNC: 42 MG/DL (ref 8–23)
BUN SERPL-MCNC: 43 MG/DL (ref 8–23)
BUN SERPL-MCNC: 53 MG/DL (ref 8–23)
BUN SERPL-MCNC: 60 MG/DL (ref 8–23)
CALCIUM SERPL-MCNC: 8.2 MG/DL (ref 8.7–10.5)
CALCIUM SERPL-MCNC: 8.4 MG/DL (ref 8.7–10.5)
CALCIUM SERPL-MCNC: 8.7 MG/DL (ref 8.7–10.5)
CALCIUM SERPL-MCNC: 8.8 MG/DL (ref 8.7–10.5)
CALCIUM SERPL-MCNC: 8.8 MG/DL (ref 8.7–10.5)
CALCIUM SERPL-MCNC: 9 MG/DL (ref 8.7–10.5)
CALCIUM SERPL-MCNC: 9 MG/DL (ref 8.7–10.5)
CHLORIDE SERPL-SCNC: 102 MMOL/L (ref 95–110)
CHLORIDE SERPL-SCNC: 103 MMOL/L (ref 95–110)
CHLORIDE SERPL-SCNC: 104 MMOL/L (ref 95–110)
CHLORIDE SERPL-SCNC: 105 MMOL/L (ref 95–110)
CHLORIDE SERPL-SCNC: 105 MMOL/L (ref 95–110)
CHLORIDE SERPL-SCNC: 99 MMOL/L (ref 95–110)
CHLORIDE SERPL-SCNC: 99 MMOL/L (ref 95–110)
CHOLEST SERPL-MCNC: 91 MG/DL (ref 120–199)
CHOLEST/HDLC SERPL: 3.4 {RATIO} (ref 2–5)
CK MB SERPL-MCNC: 1.8 NG/ML (ref 0.1–6.5)
CK MB SERPL-MCNC: 1.9 NG/ML (ref 0.1–6.5)
CK MB SERPL-MCNC: 2.1 NG/ML (ref 0.1–6.5)
CK MB SERPL-RTO: 6 % (ref 0–5)
CK MB SERPL-RTO: 6.3 % (ref 0–5)
CK MB SERPL-RTO: 6.7 % (ref 0–5)
CK SERPL-CCNC: 27 U/L (ref 20–200)
CK SERPL-CCNC: 30 U/L (ref 20–200)
CK SERPL-CCNC: 35 U/L (ref 20–200)
CLARITY UR: ABNORMAL
CLARITY UR: ABNORMAL
CO2 SERPL-SCNC: 21 MMOL/L (ref 23–29)
CO2 SERPL-SCNC: 23 MMOL/L (ref 23–29)
CO2 SERPL-SCNC: 24 MMOL/L (ref 23–29)
CO2 SERPL-SCNC: 25 MMOL/L (ref 23–29)
CO2 SERPL-SCNC: 25 MMOL/L (ref 23–29)
CO2 SERPL-SCNC: 26 MMOL/L (ref 23–29)
CO2 SERPL-SCNC: 26 MMOL/L (ref 23–29)
COLOR UR: YELLOW
COLOR UR: YELLOW
CREAT SERPL-MCNC: 4 MG/DL (ref 0.5–1.4)
CREAT SERPL-MCNC: 4.5 MG/DL (ref 0.5–1.4)
CREAT SERPL-MCNC: 5.1 MG/DL (ref 0.5–1.4)
CREAT SERPL-MCNC: 5.1 MG/DL (ref 0.5–1.4)
CREAT SERPL-MCNC: 5.3 MG/DL (ref 0.5–1.4)
CREAT SERPL-MCNC: 5.7 MG/DL (ref 0.5–1.4)
CREAT SERPL-MCNC: 6.3 MG/DL (ref 0.5–1.4)
CREAT SERPL-MCNC: 6.4 MG/DL (ref 0.5–1.4)
CREAT SERPL-MCNC: 7.3 MG/DL (ref 0.5–1.4)
DIFFERENTIAL METHOD: ABNORMAL
EOSINOPHIL # BLD AUTO: 0 K/UL (ref 0–0.5)
EOSINOPHIL # BLD AUTO: 0.1 K/UL (ref 0–0.5)
EOSINOPHIL # BLD AUTO: 0.2 K/UL (ref 0–0.5)
EOSINOPHIL # BLD AUTO: 0.3 K/UL (ref 0–0.5)
EOSINOPHIL # BLD AUTO: 0.4 K/UL (ref 0–0.5)
EOSINOPHIL NFR BLD: 0.1 % (ref 0–8)
EOSINOPHIL NFR BLD: 0.8 % (ref 0–8)
EOSINOPHIL NFR BLD: 1.2 % (ref 0–8)
EOSINOPHIL NFR BLD: 1.2 % (ref 0–8)
EOSINOPHIL NFR BLD: 2.6 % (ref 0–8)
EOSINOPHIL NFR BLD: 2.8 % (ref 0–8)
EOSINOPHIL NFR BLD: 2.9 % (ref 0–8)
EOSINOPHIL NFR BLD: 3 % (ref 0–8)
EOSINOPHIL NFR BLD: 3 % (ref 0–8)
EOSINOPHIL NFR BLD: 4.7 % (ref 0–8)
EOSINOPHIL NFR BLD: 5.3 % (ref 0–8)
ERYTHROCYTE [DISTWIDTH] IN BLOOD BY AUTOMATED COUNT: 13.7 % (ref 11.5–14.5)
ERYTHROCYTE [DISTWIDTH] IN BLOOD BY AUTOMATED COUNT: 13.8 % (ref 11.5–14.5)
ERYTHROCYTE [DISTWIDTH] IN BLOOD BY AUTOMATED COUNT: 13.8 % (ref 11.5–14.5)
ERYTHROCYTE [DISTWIDTH] IN BLOOD BY AUTOMATED COUNT: 13.9 % (ref 11.5–14.5)
ERYTHROCYTE [DISTWIDTH] IN BLOOD BY AUTOMATED COUNT: 13.9 % (ref 11.5–14.5)
ERYTHROCYTE [DISTWIDTH] IN BLOOD BY AUTOMATED COUNT: 14.3 % (ref 11.5–14.5)
ERYTHROCYTE [DISTWIDTH] IN BLOOD BY AUTOMATED COUNT: 14.4 % (ref 11.5–14.5)
ERYTHROCYTE [DISTWIDTH] IN BLOOD BY AUTOMATED COUNT: 14.4 % (ref 11.5–14.5)
ERYTHROCYTE [DISTWIDTH] IN BLOOD BY AUTOMATED COUNT: 14.6 % (ref 11.5–14.5)
EST. GFR  (AFRICAN AMERICAN): 10 ML/MIN/1.73 M^2
EST. GFR  (AFRICAN AMERICAN): 11 ML/MIN/1.73 M^2
EST. GFR  (AFRICAN AMERICAN): 13 ML/MIN/1.73 M^2
EST. GFR  (AFRICAN AMERICAN): 15 ML/MIN/1.73 M^2
EST. GFR  (AFRICAN AMERICAN): 7 ML/MIN/1.73 M^2
EST. GFR  (AFRICAN AMERICAN): 9 ML/MIN/1.73 M^2
EST. GFR  (AFRICAN AMERICAN): 9 ML/MIN/1.73 M^2
EST. GFR  (NON AFRICAN AMERICAN): 10 ML/MIN/1.73 M^2
EST. GFR  (NON AFRICAN AMERICAN): 10 ML/MIN/1.73 M^2
EST. GFR  (NON AFRICAN AMERICAN): 11 ML/MIN/1.73 M^2
EST. GFR  (NON AFRICAN AMERICAN): 13 ML/MIN/1.73 M^2
EST. GFR  (NON AFRICAN AMERICAN): 6 ML/MIN/1.73 M^2
EST. GFR  (NON AFRICAN AMERICAN): 7 ML/MIN/1.73 M^2
EST. GFR  (NON AFRICAN AMERICAN): 8 ML/MIN/1.73 M^2
EST. GFR  (NON AFRICAN AMERICAN): 9 ML/MIN/1.73 M^2
EST. GFR  (NON AFRICAN AMERICAN): 9 ML/MIN/1.73 M^2
GLUCOSE SERPL-MCNC: 102 MG/DL (ref 70–110)
GLUCOSE SERPL-MCNC: 102 MG/DL (ref 70–110)
GLUCOSE SERPL-MCNC: 113 MG/DL (ref 70–110)
GLUCOSE SERPL-MCNC: 121 MG/DL (ref 70–110)
GLUCOSE SERPL-MCNC: 130 MG/DL (ref 70–110)
GLUCOSE SERPL-MCNC: 132 MG/DL (ref 70–110)
GLUCOSE SERPL-MCNC: 133 MG/DL (ref 70–110)
GLUCOSE SERPL-MCNC: 149 MG/DL (ref 70–110)
GLUCOSE SERPL-MCNC: 94 MG/DL (ref 70–110)
GLUCOSE UR QL STRIP: NEGATIVE
GLUCOSE UR QL STRIP: NEGATIVE
GRAN CASTS #/AREA URNS LPF: 2 /LPF
HCT VFR BLD AUTO: 29.8 % (ref 40–54)
HCT VFR BLD AUTO: 30.7 % (ref 40–54)
HCT VFR BLD AUTO: 31.1 % (ref 40–54)
HCT VFR BLD AUTO: 31.1 % (ref 40–54)
HCT VFR BLD AUTO: 31.2 % (ref 40–54)
HCT VFR BLD AUTO: 31.3 % (ref 40–54)
HCT VFR BLD AUTO: 31.4 % (ref 40–54)
HCT VFR BLD AUTO: 32.5 % (ref 40–54)
HCT VFR BLD AUTO: 32.7 % (ref 40–54)
HCT VFR BLD AUTO: 33.2 % (ref 40–54)
HCT VFR BLD AUTO: 33.6 % (ref 40–54)
HCT VFR BLD AUTO: 33.7 % (ref 40–54)
HCT VFR BLD AUTO: 34.3 % (ref 40–54)
HDLC SERPL-MCNC: 27 MG/DL (ref 40–75)
HDLC SERPL: 29.7 % (ref 20–50)
HGB BLD-MCNC: 10 G/DL (ref 14–18)
HGB BLD-MCNC: 10.1 G/DL (ref 14–18)
HGB BLD-MCNC: 10.1 G/DL (ref 14–18)
HGB BLD-MCNC: 10.2 G/DL (ref 14–18)
HGB BLD-MCNC: 10.2 G/DL (ref 14–18)
HGB BLD-MCNC: 10.5 G/DL (ref 14–18)
HGB BLD-MCNC: 10.8 G/DL (ref 14–18)
HGB BLD-MCNC: 10.8 G/DL (ref 14–18)
HGB BLD-MCNC: 9.6 G/DL (ref 14–18)
HGB BLD-MCNC: 9.9 G/DL (ref 14–18)
HGB UR QL STRIP: ABNORMAL
HGB UR QL STRIP: ABNORMAL
HYALINE CASTS #/AREA URNS LPF: 0 /LPF
HYALINE CASTS #/AREA URNS LPF: 1 /LPF
IMM GRANULOCYTES # BLD AUTO: 0.02 K/UL (ref 0–0.04)
IMM GRANULOCYTES # BLD AUTO: 0.03 K/UL (ref 0–0.04)
IMM GRANULOCYTES # BLD AUTO: 0.04 K/UL (ref 0–0.04)
IMM GRANULOCYTES NFR BLD AUTO: 0.3 % (ref 0–0.5)
IMM GRANULOCYTES NFR BLD AUTO: 0.4 % (ref 0–0.5)
IMM GRANULOCYTES NFR BLD AUTO: 0.5 % (ref 0–0.5)
IMM GRANULOCYTES NFR BLD AUTO: 0.6 % (ref 0–0.5)
INR PPP: 1.1 (ref 0.8–1.2)
INR PPP: 1.2 (ref 0.8–1.2)
INR PPP: 2.4 (ref 0.8–1.2)
KETONES UR QL STRIP: ABNORMAL
KETONES UR QL STRIP: NEGATIVE
LDLC SERPL CALC-MCNC: 52.2 MG/DL (ref 63–159)
LEUKOCYTE ESTERASE UR QL STRIP: ABNORMAL
LEUKOCYTE ESTERASE UR QL STRIP: NEGATIVE
LYMPHOCYTES # BLD AUTO: 0.9 K/UL (ref 1–4.8)
LYMPHOCYTES # BLD AUTO: 1 K/UL (ref 1–4.8)
LYMPHOCYTES # BLD AUTO: 1.1 K/UL (ref 1–4.8)
LYMPHOCYTES # BLD AUTO: 1.2 K/UL (ref 1–4.8)
LYMPHOCYTES # BLD AUTO: 1.2 K/UL (ref 1–4.8)
LYMPHOCYTES # BLD AUTO: 1.3 K/UL (ref 1–4.8)
LYMPHOCYTES # BLD AUTO: 1.4 K/UL (ref 1–4.8)
LYMPHOCYTES # BLD AUTO: 1.5 K/UL (ref 1–4.8)
LYMPHOCYTES # BLD AUTO: 1.8 K/UL (ref 1–4.8)
LYMPHOCYTES # BLD AUTO: 2.1 K/UL (ref 1–4.8)
LYMPHOCYTES NFR BLD: 10.5 % (ref 18–48)
LYMPHOCYTES NFR BLD: 13.2 % (ref 18–48)
LYMPHOCYTES NFR BLD: 13.9 % (ref 18–48)
LYMPHOCYTES NFR BLD: 14.6 % (ref 18–48)
LYMPHOCYTES NFR BLD: 14.6 % (ref 18–48)
LYMPHOCYTES NFR BLD: 15.1 % (ref 18–48)
LYMPHOCYTES NFR BLD: 17.5 % (ref 18–48)
LYMPHOCYTES NFR BLD: 17.7 % (ref 18–48)
LYMPHOCYTES NFR BLD: 18.5 % (ref 18–48)
LYMPHOCYTES NFR BLD: 19.8 % (ref 18–48)
LYMPHOCYTES NFR BLD: 21.8 % (ref 18–48)
LYMPHOCYTES NFR BLD: 23.1 % (ref 18–48)
LYMPHOCYTES NFR BLD: 24.6 % (ref 18–48)
MAGNESIUM SERPL-MCNC: 2 MG/DL (ref 1.6–2.6)
MAGNESIUM SERPL-MCNC: 2.1 MG/DL (ref 1.6–2.6)
MAGNESIUM SERPL-MCNC: 2.1 MG/DL (ref 1.6–2.6)
MCH RBC QN AUTO: 32.1 PG (ref 27–31)
MCH RBC QN AUTO: 32.1 PG (ref 27–31)
MCH RBC QN AUTO: 32.2 PG (ref 27–31)
MCH RBC QN AUTO: 32.4 PG (ref 27–31)
MCH RBC QN AUTO: 32.4 PG (ref 27–31)
MCH RBC QN AUTO: 32.6 PG (ref 27–31)
MCH RBC QN AUTO: 32.7 PG (ref 27–31)
MCH RBC QN AUTO: 32.8 PG (ref 27–31)
MCH RBC QN AUTO: 32.8 PG (ref 27–31)
MCH RBC QN AUTO: 32.9 PG (ref 27–31)
MCH RBC QN AUTO: 32.9 PG (ref 27–31)
MCH RBC QN AUTO: 33 PG (ref 27–31)
MCH RBC QN AUTO: 33.4 PG (ref 27–31)
MCHC RBC AUTO-ENTMCNC: 31.2 G/DL (ref 32–36)
MCHC RBC AUTO-ENTMCNC: 31.5 G/DL (ref 32–36)
MCHC RBC AUTO-ENTMCNC: 31.6 G/DL (ref 32–36)
MCHC RBC AUTO-ENTMCNC: 32.1 G/DL (ref 32–36)
MCHC RBC AUTO-ENTMCNC: 32.1 G/DL (ref 32–36)
MCHC RBC AUTO-ENTMCNC: 32.2 G/DL (ref 32–36)
MCHC RBC AUTO-ENTMCNC: 32.3 G/DL (ref 32–36)
MCHC RBC AUTO-ENTMCNC: 32.4 G/DL (ref 32–36)
MCHC RBC AUTO-ENTMCNC: 32.5 G/DL (ref 32–36)
MCHC RBC AUTO-ENTMCNC: 32.5 G/DL (ref 32–36)
MCHC RBC AUTO-ENTMCNC: 32.6 G/DL (ref 32–36)
MCV RBC AUTO: 100 FL (ref 82–98)
MCV RBC AUTO: 101 FL (ref 82–98)
MCV RBC AUTO: 102 FL (ref 82–98)
MCV RBC AUTO: 103 FL (ref 82–98)
MICROSCOPIC COMMENT: ABNORMAL
MICROSCOPIC COMMENT: ABNORMAL
MONOCYTES # BLD AUTO: 0.4 K/UL (ref 0.3–1)
MONOCYTES # BLD AUTO: 0.5 K/UL (ref 0.3–1)
MONOCYTES # BLD AUTO: 0.6 K/UL (ref 0.3–1)
MONOCYTES # BLD AUTO: 0.7 K/UL (ref 0.3–1)
MONOCYTES # BLD AUTO: 0.7 K/UL (ref 0.3–1)
MONOCYTES # BLD AUTO: 0.8 K/UL (ref 0.3–1)
MONOCYTES # BLD AUTO: 0.8 K/UL (ref 0.3–1)
MONOCYTES NFR BLD: 5.8 % (ref 4–15)
MONOCYTES NFR BLD: 6.5 % (ref 4–15)
MONOCYTES NFR BLD: 6.8 % (ref 4–15)
MONOCYTES NFR BLD: 6.9 % (ref 4–15)
MONOCYTES NFR BLD: 7.4 % (ref 4–15)
MONOCYTES NFR BLD: 7.5 % (ref 4–15)
MONOCYTES NFR BLD: 7.5 % (ref 4–15)
MONOCYTES NFR BLD: 7.6 % (ref 4–15)
MONOCYTES NFR BLD: 7.8 % (ref 4–15)
MONOCYTES NFR BLD: 8 % (ref 4–15)
MONOCYTES NFR BLD: 8 % (ref 4–15)
MONOCYTES NFR BLD: 8.2 % (ref 4–15)
MONOCYTES NFR BLD: 9.9 % (ref 4–15)
NEUTROPHILS # BLD AUTO: 4.2 K/UL (ref 1.8–7.7)
NEUTROPHILS # BLD AUTO: 4.4 K/UL (ref 1.8–7.7)
NEUTROPHILS # BLD AUTO: 4.5 K/UL (ref 1.8–7.7)
NEUTROPHILS # BLD AUTO: 4.6 K/UL (ref 1.8–7.7)
NEUTROPHILS # BLD AUTO: 4.6 K/UL (ref 1.8–7.7)
NEUTROPHILS # BLD AUTO: 5.2 K/UL (ref 1.8–7.7)
NEUTROPHILS # BLD AUTO: 5.3 K/UL (ref 1.8–7.7)
NEUTROPHILS # BLD AUTO: 6.2 K/UL (ref 1.8–7.7)
NEUTROPHILS # BLD AUTO: 6.4 K/UL (ref 1.8–7.7)
NEUTROPHILS # BLD AUTO: 6.6 K/UL (ref 1.8–7.7)
NEUTROPHILS # BLD AUTO: 6.7 K/UL (ref 1.8–7.7)
NEUTROPHILS # BLD AUTO: 7.2 K/UL (ref 1.8–7.7)
NEUTROPHILS # BLD AUTO: 7.6 K/UL (ref 1.8–7.7)
NEUTROPHILS NFR BLD: 63.8 % (ref 38–73)
NEUTROPHILS NFR BLD: 64 % (ref 38–73)
NEUTROPHILS NFR BLD: 68.4 % (ref 38–73)
NEUTROPHILS NFR BLD: 68.4 % (ref 38–73)
NEUTROPHILS NFR BLD: 68.9 % (ref 38–73)
NEUTROPHILS NFR BLD: 70.7 % (ref 38–73)
NEUTROPHILS NFR BLD: 70.9 % (ref 38–73)
NEUTROPHILS NFR BLD: 74.3 % (ref 38–73)
NEUTROPHILS NFR BLD: 75.5 % (ref 38–73)
NEUTROPHILS NFR BLD: 76.4 % (ref 38–73)
NEUTROPHILS NFR BLD: 76.7 % (ref 38–73)
NEUTROPHILS NFR BLD: 79 % (ref 38–73)
NEUTROPHILS NFR BLD: 80.7 % (ref 38–73)
NITRITE UR QL STRIP: NEGATIVE
NITRITE UR QL STRIP: NEGATIVE
NONHDLC SERPL-MCNC: 64 MG/DL
NRBC BLD-RTO: 0 /100 WBC
PH UR STRIP: 6 [PH] (ref 5–8)
PH UR STRIP: 7 [PH] (ref 5–8)
PHOSPHATE SERPL-MCNC: 3.2 MG/DL (ref 2.7–4.5)
PHOSPHATE SERPL-MCNC: 3.5 MG/DL (ref 2.7–4.5)
PHOSPHATE SERPL-MCNC: 4 MG/DL (ref 2.7–4.5)
PHOSPHATE SERPL-MCNC: 4 MG/DL (ref 2.7–4.5)
PLATELET # BLD AUTO: 125 K/UL (ref 150–350)
PLATELET # BLD AUTO: 126 K/UL (ref 150–350)
PLATELET # BLD AUTO: 139 K/UL (ref 150–350)
PLATELET # BLD AUTO: 141 K/UL (ref 150–350)
PLATELET # BLD AUTO: 143 K/UL (ref 150–350)
PLATELET # BLD AUTO: 145 K/UL (ref 150–350)
PLATELET # BLD AUTO: 148 K/UL (ref 150–350)
PLATELET # BLD AUTO: 149 K/UL (ref 150–350)
PLATELET # BLD AUTO: 152 K/UL (ref 150–350)
PLATELET # BLD AUTO: 161 K/UL (ref 150–350)
PLATELET # BLD AUTO: 174 K/UL (ref 150–350)
PLATELET # BLD AUTO: 181 K/UL (ref 150–350)
PLATELET # BLD AUTO: 184 K/UL (ref 150–350)
PLATELET BLD QL SMEAR: ABNORMAL
PMV BLD AUTO: 10.1 FL (ref 9.2–12.9)
PMV BLD AUTO: 10.2 FL (ref 9.2–12.9)
PMV BLD AUTO: 10.2 FL (ref 9.2–12.9)
PMV BLD AUTO: 10.3 FL (ref 9.2–12.9)
PMV BLD AUTO: 10.3 FL (ref 9.2–12.9)
PMV BLD AUTO: 10.4 FL (ref 9.2–12.9)
PMV BLD AUTO: 10.6 FL (ref 9.2–12.9)
PMV BLD AUTO: 10.7 FL (ref 9.2–12.9)
PMV BLD AUTO: 11.2 FL (ref 9.2–12.9)
PMV BLD AUTO: 9.5 FL (ref 9.2–12.9)
PMV BLD AUTO: 9.5 FL (ref 9.2–12.9)
PMV BLD AUTO: 9.6 FL (ref 9.2–12.9)
PMV BLD AUTO: 9.9 FL (ref 9.2–12.9)
POCT GLUCOSE: 102 MG/DL (ref 70–110)
POCT GLUCOSE: 104 MG/DL (ref 70–110)
POCT GLUCOSE: 107 MG/DL (ref 70–110)
POCT GLUCOSE: 109 MG/DL (ref 70–110)
POCT GLUCOSE: 113 MG/DL (ref 70–110)
POCT GLUCOSE: 125 MG/DL (ref 70–110)
POCT GLUCOSE: 146 MG/DL (ref 70–110)
POCT GLUCOSE: 147 MG/DL (ref 70–110)
POCT GLUCOSE: 151 MG/DL (ref 70–110)
POCT GLUCOSE: 83 MG/DL (ref 70–110)
POCT GLUCOSE: 89 MG/DL (ref 70–110)
POCT GLUCOSE: 97 MG/DL (ref 70–110)
POTASSIUM SERPL-SCNC: 3.7 MMOL/L (ref 3.5–5.1)
POTASSIUM SERPL-SCNC: 4 MMOL/L (ref 3.5–5.1)
POTASSIUM SERPL-SCNC: 4 MMOL/L (ref 3.5–5.1)
POTASSIUM SERPL-SCNC: 4.1 MMOL/L (ref 3.5–5.1)
POTASSIUM SERPL-SCNC: 4.2 MMOL/L (ref 3.5–5.1)
POTASSIUM SERPL-SCNC: 4.3 MMOL/L (ref 3.5–5.1)
POTASSIUM SERPL-SCNC: 4.7 MMOL/L (ref 3.5–5.1)
POTASSIUM SERPL-SCNC: 5 MMOL/L (ref 3.5–5.1)
POTASSIUM SERPL-SCNC: 5.2 MMOL/L (ref 3.5–5.1)
PROCALCITONIN SERPL IA-MCNC: 0.26 NG/ML
PROT SERPL-MCNC: 6.2 G/DL (ref 6–8.4)
PROT SERPL-MCNC: 6.3 G/DL (ref 6–8.4)
PROT SERPL-MCNC: 6.7 G/DL (ref 6–8.4)
PROT SERPL-MCNC: 6.8 G/DL (ref 6–8.4)
PROT SERPL-MCNC: 7 G/DL (ref 6–8.4)
PROT SERPL-MCNC: 7.1 G/DL (ref 6–8.4)
PROT SERPL-MCNC: 7.2 G/DL (ref 6–8.4)
PROT UR QL STRIP: ABNORMAL
PROT UR QL STRIP: ABNORMAL
PROTHROMBIN TIME: 11.8 SEC (ref 9–12.5)
PROTHROMBIN TIME: 12 SEC (ref 9–12.5)
PROTHROMBIN TIME: 23.5 SEC (ref 9–12.5)
RBC # BLD AUTO: 2.96 M/UL (ref 4.6–6.2)
RBC # BLD AUTO: 3.02 M/UL (ref 4.6–6.2)
RBC # BLD AUTO: 3.04 M/UL (ref 4.6–6.2)
RBC # BLD AUTO: 3.05 M/UL (ref 4.6–6.2)
RBC # BLD AUTO: 3.09 M/UL (ref 4.6–6.2)
RBC # BLD AUTO: 3.11 M/UL (ref 4.6–6.2)
RBC # BLD AUTO: 3.12 M/UL (ref 4.6–6.2)
RBC # BLD AUTO: 3.18 M/UL (ref 4.6–6.2)
RBC # BLD AUTO: 3.19 M/UL (ref 4.6–6.2)
RBC # BLD AUTO: 3.26 M/UL (ref 4.6–6.2)
RBC # BLD AUTO: 3.27 M/UL (ref 4.6–6.2)
RBC # BLD AUTO: 3.31 M/UL (ref 4.6–6.2)
RBC # BLD AUTO: 3.36 M/UL (ref 4.6–6.2)
RBC #/AREA URNS HPF: 10 /HPF (ref 0–4)
RBC #/AREA URNS HPF: >100 /HPF (ref 0–4)
SARS-COV-2 RDRP RESP QL NAA+PROBE: NEGATIVE
SODIUM SERPL-SCNC: 135 MMOL/L (ref 136–145)
SODIUM SERPL-SCNC: 137 MMOL/L (ref 136–145)
SODIUM SERPL-SCNC: 138 MMOL/L (ref 136–145)
SODIUM SERPL-SCNC: 138 MMOL/L (ref 136–145)
SODIUM SERPL-SCNC: 139 MMOL/L (ref 136–145)
SODIUM SERPL-SCNC: 140 MMOL/L (ref 136–145)
SODIUM SERPL-SCNC: 140 MMOL/L (ref 136–145)
SP GR UR STRIP: 1.01 (ref 1–1.03)
SP GR UR STRIP: 1.02 (ref 1–1.03)
SQUAMOUS #/AREA URNS HPF: 0 /HPF
SQUAMOUS #/AREA URNS HPF: 8 /HPF
TRIGL SERPL-MCNC: 59 MG/DL (ref 30–150)
TROPONIN I SERPL DL<=0.01 NG/ML-MCNC: 0.04 NG/ML (ref 0–0.03)
TROPONIN I SERPL DL<=0.01 NG/ML-MCNC: 0.05 NG/ML (ref 0–0.03)
TROPONIN I SERPL DL<=0.01 NG/ML-MCNC: 0.06 NG/ML (ref 0–0.03)
TROPONIN I SERPL DL<=0.01 NG/ML-MCNC: 0.07 NG/ML (ref 0–0.03)
TROPONIN I SERPL DL<=0.01 NG/ML-MCNC: 0.07 NG/ML (ref 0–0.03)
TSH SERPL DL<=0.005 MIU/L-ACNC: 1.16 UIU/ML (ref 0.4–4)
URN SPEC COLLECT METH UR: ABNORMAL
URN SPEC COLLECT METH UR: ABNORMAL
UROBILINOGEN UR STRIP-ACNC: NEGATIVE EU/DL
UROBILINOGEN UR STRIP-ACNC: NEGATIVE EU/DL
WBC # BLD AUTO: 10.12 K/UL (ref 3.9–12.7)
WBC # BLD AUTO: 6.28 K/UL (ref 3.9–12.7)
WBC # BLD AUTO: 6.53 K/UL (ref 3.9–12.7)
WBC # BLD AUTO: 6.61 K/UL (ref 3.9–12.7)
WBC # BLD AUTO: 6.64 K/UL (ref 3.9–12.7)
WBC # BLD AUTO: 7.16 K/UL (ref 3.9–12.7)
WBC # BLD AUTO: 7.17 K/UL (ref 3.9–12.7)
WBC # BLD AUTO: 7.55 K/UL (ref 3.9–12.7)
WBC # BLD AUTO: 8.29 K/UL (ref 3.9–12.7)
WBC # BLD AUTO: 8.42 K/UL (ref 3.9–12.7)
WBC # BLD AUTO: 8.63 K/UL (ref 3.9–12.7)
WBC # BLD AUTO: 8.99 K/UL (ref 3.9–12.7)
WBC # BLD AUTO: 9.12 K/UL (ref 3.9–12.7)
WBC #/AREA URNS HPF: >100 /HPF (ref 0–5)
WBC #/AREA URNS HPF: >100 /HPF (ref 0–5)

## 2021-01-01 PROCEDURE — 36415 COLL VENOUS BLD VENIPUNCTURE: CPT

## 2021-01-01 PROCEDURE — 80100014 HC HEMODIALYSIS 1:1

## 2021-01-01 PROCEDURE — 96375 TX/PRO/DX INJ NEW DRUG ADDON: CPT

## 2021-01-01 PROCEDURE — G0378 HOSPITAL OBSERVATION PER HR: HCPCS

## 2021-01-01 PROCEDURE — 94761 N-INVAS EAR/PLS OXIMETRY MLT: CPT

## 2021-01-01 PROCEDURE — 80053 COMPREHEN METABOLIC PANEL: CPT

## 2021-01-01 PROCEDURE — 36415 COLL VENOUS BLD VENIPUNCTURE: CPT | Performed by: INTERNAL MEDICINE

## 2021-01-01 PROCEDURE — 93010 ELECTROCARDIOGRAM REPORT: CPT | Mod: ,,, | Performed by: INTERNAL MEDICINE

## 2021-01-01 PROCEDURE — 81000 URINALYSIS NONAUTO W/SCOPE: CPT | Performed by: NURSE PRACTITIONER

## 2021-01-01 PROCEDURE — 36415 COLL VENOUS BLD VENIPUNCTURE: CPT | Performed by: EMERGENCY MEDICINE

## 2021-01-01 PROCEDURE — 63600175 PHARM REV CODE 636 W HCPCS: Performed by: NURSE PRACTITIONER

## 2021-01-01 PROCEDURE — 25000003 PHARM REV CODE 250: Performed by: INTERNAL MEDICINE

## 2021-01-01 PROCEDURE — 84145 PROCALCITONIN (PCT): CPT | Performed by: INTERNAL MEDICINE

## 2021-01-01 PROCEDURE — 99215 OFFICE O/P EST HI 40 MIN: CPT | Mod: S$GLB,,, | Performed by: INTERNAL MEDICINE

## 2021-01-01 PROCEDURE — C9113 INJ PANTOPRAZOLE SODIUM, VIA: HCPCS | Performed by: NURSE PRACTITIONER

## 2021-01-01 PROCEDURE — 85025 COMPLETE CBC W/AUTO DIFF WBC: CPT | Performed by: INTERNAL MEDICINE

## 2021-01-01 PROCEDURE — 84100 ASSAY OF PHOSPHORUS: CPT | Performed by: INTERNAL MEDICINE

## 2021-01-01 PROCEDURE — 80053 COMPREHEN METABOLIC PANEL: CPT | Performed by: NURSE PRACTITIONER

## 2021-01-01 PROCEDURE — 99284 EMERGENCY DEPT VISIT MOD MDM: CPT | Mod: 25

## 2021-01-01 PROCEDURE — U0002 COVID-19 LAB TEST NON-CDC: HCPCS

## 2021-01-01 PROCEDURE — 25000003 PHARM REV CODE 250: Performed by: NURSE PRACTITIONER

## 2021-01-01 PROCEDURE — 63600175 PHARM REV CODE 636 W HCPCS: Performed by: INTERNAL MEDICINE

## 2021-01-01 PROCEDURE — 99225 PR SUBSEQUENT OBSERVATION CARE,LEVEL II: ICD-10-PCS | Mod: ,,, | Performed by: SPECIALIST

## 2021-01-01 PROCEDURE — U0002 COVID-19 LAB TEST NON-CDC: HCPCS | Performed by: EMERGENCY MEDICINE

## 2021-01-01 PROCEDURE — 85610 PROTHROMBIN TIME: CPT | Performed by: EMERGENCY MEDICINE

## 2021-01-01 PROCEDURE — 85025 COMPLETE CBC W/AUTO DIFF WBC: CPT | Mod: 91 | Performed by: NURSE PRACTITIONER

## 2021-01-01 PROCEDURE — 99214 OFFICE O/P EST MOD 30 MIN: CPT | Mod: ,,, | Performed by: INTERNAL MEDICINE

## 2021-01-01 PROCEDURE — 82550 ASSAY OF CK (CPK): CPT | Mod: 91 | Performed by: INTERNAL MEDICINE

## 2021-01-01 PROCEDURE — 99214 PR OFFICE/OUTPT VISIT, EST, LEVL IV, 30-39 MIN: ICD-10-PCS | Mod: ,,, | Performed by: INTERNAL MEDICINE

## 2021-01-01 PROCEDURE — 99225 PR SUBSEQUENT OBSERVATION CARE,LEVEL II: CPT | Mod: ,,, | Performed by: SPECIALIST

## 2021-01-01 PROCEDURE — 96374 THER/PROPH/DIAG INJ IV PUSH: CPT

## 2021-01-01 PROCEDURE — 80053 COMPREHEN METABOLIC PANEL: CPT | Performed by: INTERNAL MEDICINE

## 2021-01-01 PROCEDURE — 11000001 HC ACUTE MED/SURG PRIVATE ROOM

## 2021-01-01 PROCEDURE — 85025 COMPLETE CBC W/AUTO DIFF WBC: CPT | Performed by: EMERGENCY MEDICINE

## 2021-01-01 PROCEDURE — 63600175 PHARM REV CODE 636 W HCPCS: Performed by: HOSPITALIST

## 2021-01-01 PROCEDURE — 81000 URINALYSIS NONAUTO W/SCOPE: CPT

## 2021-01-01 PROCEDURE — 83735 ASSAY OF MAGNESIUM: CPT | Performed by: INTERNAL MEDICINE

## 2021-01-01 PROCEDURE — 84484 ASSAY OF TROPONIN QUANT: CPT | Performed by: INTERNAL MEDICINE

## 2021-01-01 PROCEDURE — 25000003 PHARM REV CODE 250: Performed by: EMERGENCY MEDICINE

## 2021-01-01 PROCEDURE — 93010 EKG 12-LEAD: ICD-10-PCS | Mod: ,,, | Performed by: INTERNAL MEDICINE

## 2021-01-01 PROCEDURE — 99900035 HC TECH TIME PER 15 MIN (STAT)

## 2021-01-01 PROCEDURE — 99283 EMERGENCY DEPT VISIT LOW MDM: CPT | Mod: 25

## 2021-01-01 PROCEDURE — 99285 EMERGENCY DEPT VISIT HI MDM: CPT | Mod: 25

## 2021-01-01 PROCEDURE — 36415 COLL VENOUS BLD VENIPUNCTURE: CPT | Performed by: NURSE PRACTITIONER

## 2021-01-01 PROCEDURE — 85025 COMPLETE CBC W/AUTO DIFF WBC: CPT | Performed by: NURSE PRACTITIONER

## 2021-01-01 PROCEDURE — U0002 COVID-19 LAB TEST NON-CDC: HCPCS | Performed by: NURSE PRACTITIONER

## 2021-01-01 PROCEDURE — 93010 ELECTROCARDIOGRAM REPORT: CPT | Mod: 76,,, | Performed by: INTERNAL MEDICINE

## 2021-01-01 PROCEDURE — 82553 CREATINE MB FRACTION: CPT | Performed by: INTERNAL MEDICINE

## 2021-01-01 PROCEDURE — 97165 OT EVAL LOW COMPLEX 30 MIN: CPT

## 2021-01-01 PROCEDURE — 83735 ASSAY OF MAGNESIUM: CPT | Performed by: NURSE PRACTITIONER

## 2021-01-01 PROCEDURE — 27000221 HC OXYGEN, UP TO 24 HOURS

## 2021-01-01 PROCEDURE — 93010 EKG 12-LEAD: ICD-10-PCS | Mod: 76,,, | Performed by: INTERNAL MEDICINE

## 2021-01-01 PROCEDURE — 86900 BLOOD TYPING SEROLOGIC ABO: CPT | Performed by: NURSE PRACTITIONER

## 2021-01-01 PROCEDURE — 83880 ASSAY OF NATRIURETIC PEPTIDE: CPT | Performed by: NURSE PRACTITIONER

## 2021-01-01 PROCEDURE — 25500020 PHARM REV CODE 255: Performed by: INTERNAL MEDICINE

## 2021-01-01 PROCEDURE — 85730 THROMBOPLASTIN TIME PARTIAL: CPT | Performed by: EMERGENCY MEDICINE

## 2021-01-01 PROCEDURE — 96376 TX/PRO/DX INJ SAME DRUG ADON: CPT

## 2021-01-01 PROCEDURE — G0257 UNSCHED DIALYSIS ESRD PT HOS: HCPCS

## 2021-01-01 PROCEDURE — 25000003 PHARM REV CODE 250: Performed by: STUDENT IN AN ORGANIZED HEALTH CARE EDUCATION/TRAINING PROGRAM

## 2021-01-01 PROCEDURE — 30905 CONTROL OF NOSEBLEED: CPT | Mod: RT

## 2021-01-01 PROCEDURE — 97530 THERAPEUTIC ACTIVITIES: CPT

## 2021-01-01 PROCEDURE — 80053 COMPREHEN METABOLIC PANEL: CPT | Performed by: EMERGENCY MEDICINE

## 2021-01-01 PROCEDURE — 80061 LIPID PANEL: CPT | Performed by: INTERNAL MEDICINE

## 2021-01-01 PROCEDURE — 85610 PROTHROMBIN TIME: CPT | Performed by: NURSE PRACTITIONER

## 2021-01-01 PROCEDURE — 30901 CONTROL OF NOSEBLEED: CPT

## 2021-01-01 PROCEDURE — G0179 MD RECERTIFICATION HHA PT: HCPCS | Mod: ,,, | Performed by: INTERNAL MEDICINE

## 2021-01-01 PROCEDURE — 87040 BLOOD CULTURE FOR BACTERIA: CPT | Performed by: NURSE PRACTITIONER

## 2021-01-01 PROCEDURE — 63600175 PHARM REV CODE 636 W HCPCS: Performed by: EMERGENCY MEDICINE

## 2021-01-01 PROCEDURE — 84484 ASSAY OF TROPONIN QUANT: CPT | Performed by: NURSE PRACTITIONER

## 2021-01-01 PROCEDURE — 93005 ELECTROCARDIOGRAM TRACING: CPT

## 2021-01-01 PROCEDURE — G0179 PR HOME HEALTH MD RECERTIFICATION: ICD-10-PCS | Mod: ,,, | Performed by: INTERNAL MEDICINE

## 2021-01-01 PROCEDURE — 87086 URINE CULTURE/COLONY COUNT: CPT | Performed by: NURSE PRACTITIONER

## 2021-01-01 PROCEDURE — 97161 PT EVAL LOW COMPLEX 20 MIN: CPT

## 2021-01-01 PROCEDURE — 96365 THER/PROPH/DIAG IV INF INIT: CPT

## 2021-01-01 PROCEDURE — 99215 PR OFFICE/OUTPT VISIT, EST, LEVL V, 40-54 MIN: ICD-10-PCS | Mod: S$GLB,,, | Performed by: INTERNAL MEDICINE

## 2021-01-01 PROCEDURE — 84484 ASSAY OF TROPONIN QUANT: CPT | Mod: 91 | Performed by: INTERNAL MEDICINE

## 2021-01-01 PROCEDURE — 30901 CONTROL OF NOSEBLEED: CPT | Mod: RT

## 2021-01-01 PROCEDURE — 82550 ASSAY OF CK (CPK): CPT | Performed by: INTERNAL MEDICINE

## 2021-01-01 PROCEDURE — 82553 CREATINE MB FRACTION: CPT | Mod: 91 | Performed by: INTERNAL MEDICINE

## 2021-01-01 PROCEDURE — G0180 PR HOME HEALTH MD CERTIFICATION: ICD-10-PCS | Mod: ,,, | Performed by: INTERNAL MEDICINE

## 2021-01-01 PROCEDURE — 85025 COMPLETE CBC W/AUTO DIFF WBC: CPT

## 2021-01-01 PROCEDURE — 96375 TX/PRO/DX INJ NEW DRUG ADDON: CPT | Mod: 59

## 2021-01-01 PROCEDURE — G0180 MD CERTIFICATION HHA PATIENT: HCPCS | Mod: ,,, | Performed by: INTERNAL MEDICINE

## 2021-01-01 PROCEDURE — 85730 THROMBOPLASTIN TIME PARTIAL: CPT | Performed by: NURSE PRACTITIONER

## 2021-01-01 PROCEDURE — 84443 ASSAY THYROID STIM HORMONE: CPT | Performed by: INTERNAL MEDICINE

## 2021-01-01 PROCEDURE — 87086 URINE CULTURE/COLONY COUNT: CPT

## 2021-01-01 PROCEDURE — 12000002 HC ACUTE/MED SURGE SEMI-PRIVATE ROOM

## 2021-01-01 RX ORDER — AMOXICILLIN 500 MG/1
500 TABLET, FILM COATED ORAL DAILY
Qty: 5 TABLET | Refills: 0 | Status: SHIPPED | OUTPATIENT
Start: 2021-01-01 | End: 2021-01-01

## 2021-01-01 RX ORDER — LANOLIN ALCOHOL/MO/W.PET/CERES
800 CREAM (GRAM) TOPICAL
Status: DISCONTINUED | OUTPATIENT
Start: 2021-01-01 | End: 2021-01-01 | Stop reason: HOSPADM

## 2021-01-01 RX ORDER — SODIUM,POTASSIUM PHOSPHATES 280-250MG
2 POWDER IN PACKET (EA) ORAL
Status: DISCONTINUED | OUTPATIENT
Start: 2021-01-01 | End: 2021-01-01 | Stop reason: HOSPADM

## 2021-01-01 RX ORDER — HALOPERIDOL 5 MG/ML
2.5 INJECTION INTRAMUSCULAR ONCE
Status: COMPLETED | OUTPATIENT
Start: 2021-01-01 | End: 2021-01-01

## 2021-01-01 RX ORDER — ALBUTEROL SULFATE 0.63 MG/3ML
0.63 SOLUTION RESPIRATORY (INHALATION) EVERY 6 HOURS PRN
Qty: 1 BOX | Refills: 0 | Status: SHIPPED | OUTPATIENT
Start: 2021-01-01 | End: 2022-03-05

## 2021-01-01 RX ORDER — SODIUM CHLORIDE 9 MG/ML
INJECTION, SOLUTION INTRAVENOUS CONTINUOUS
Status: DISCONTINUED | OUTPATIENT
Start: 2021-01-01 | End: 2021-01-01

## 2021-01-01 RX ORDER — CARVEDILOL 6.25 MG/1
12.5 TABLET ORAL DAILY
Status: CANCELLED | OUTPATIENT
Start: 2021-01-01

## 2021-01-01 RX ORDER — HYDRALAZINE HYDROCHLORIDE 25 MG/1
25 TABLET, FILM COATED ORAL EVERY 8 HOURS
Status: DISCONTINUED | OUTPATIENT
Start: 2021-01-01 | End: 2021-01-01 | Stop reason: HOSPADM

## 2021-01-01 RX ORDER — GLUCAGON 1 MG
1 KIT INJECTION
Status: DISCONTINUED | OUTPATIENT
Start: 2021-01-01 | End: 2021-01-01 | Stop reason: HOSPADM

## 2021-01-01 RX ORDER — LEVETIRACETAM 100 MG/ML
750 SOLUTION ORAL DAILY
Status: DISCONTINUED | OUTPATIENT
Start: 2021-01-01 | End: 2021-01-01 | Stop reason: HOSPADM

## 2021-01-01 RX ORDER — ALLOPURINOL 100 MG/1
100 TABLET ORAL DAILY
Status: CANCELLED | OUTPATIENT
Start: 2021-01-01

## 2021-01-01 RX ORDER — OXYMETAZOLINE HCL 0.05 %
1 SPRAY, NON-AEROSOL (ML) NASAL
Status: CANCELLED | OUTPATIENT
Start: 2021-01-01 | End: 2021-01-01

## 2021-01-01 RX ORDER — ALLOPURINOL 100 MG/1
100 TABLET ORAL DAILY
Status: DISCONTINUED | OUTPATIENT
Start: 2021-01-01 | End: 2021-01-01 | Stop reason: HOSPADM

## 2021-01-01 RX ORDER — AMOXICILLIN AND CLAVULANATE POTASSIUM 500; 125 MG/1; MG/1
1 TABLET, FILM COATED ORAL 2 TIMES DAILY
Qty: 14 TABLET | Refills: 0 | Status: SHIPPED | OUTPATIENT
Start: 2021-01-01

## 2021-01-01 RX ORDER — HYDRALAZINE HYDROCHLORIDE 25 MG/1
25 TABLET, FILM COATED ORAL EVERY 8 HOURS
Qty: 90 TABLET | Refills: 0 | Status: SHIPPED | OUTPATIENT
Start: 2021-01-01 | End: 2022-03-12

## 2021-01-01 RX ORDER — HALOPERIDOL 0.5 MG/1
0.5 TABLET ORAL 3 TIMES DAILY PRN
Qty: 30 TABLET | Refills: 0 | Status: SHIPPED | OUTPATIENT
Start: 2021-01-01 | End: 2022-01-26

## 2021-01-01 RX ORDER — AMOXICILLIN AND CLAVULANATE POTASSIUM 500; 125 MG/1; MG/1
1 TABLET, FILM COATED ORAL 2 TIMES DAILY
Status: DISCONTINUED | OUTPATIENT
Start: 2021-01-01 | End: 2021-01-01 | Stop reason: HOSPADM

## 2021-01-01 RX ORDER — BUSPIRONE HYDROCHLORIDE 5 MG/1
5 TABLET ORAL 3 TIMES DAILY PRN
Status: CANCELLED | OUTPATIENT
Start: 2021-01-01

## 2021-01-01 RX ORDER — SODIUM CHLORIDE 9 MG/ML
INJECTION, SOLUTION INTRAVENOUS
Status: CANCELLED | OUTPATIENT
Start: 2021-01-01

## 2021-01-01 RX ORDER — ATORVASTATIN CALCIUM 40 MG/1
40 TABLET, FILM COATED ORAL DAILY
Status: DISCONTINUED | OUTPATIENT
Start: 2021-01-01 | End: 2021-01-01

## 2021-01-01 RX ORDER — ATORVASTATIN CALCIUM 40 MG/1
40 TABLET, FILM COATED ORAL DAILY
Status: CANCELLED | OUTPATIENT
Start: 2021-01-01

## 2021-01-01 RX ORDER — CLONIDINE HYDROCHLORIDE 0.1 MG/1
0.1 TABLET ORAL ONCE
Status: COMPLETED | OUTPATIENT
Start: 2021-01-01 | End: 2021-01-01

## 2021-01-01 RX ORDER — AMOXICILLIN AND CLAVULANATE POTASSIUM 500; 125 MG/1; MG/1
1 TABLET, FILM COATED ORAL 2 TIMES DAILY
Status: CANCELLED | OUTPATIENT
Start: 2021-01-01

## 2021-01-01 RX ORDER — ATORVASTATIN CALCIUM 40 MG/1
40 TABLET, FILM COATED ORAL DAILY
Qty: 90 TABLET | Refills: 4
Start: 2021-03-26

## 2021-01-01 RX ORDER — BUSPIRONE HYDROCHLORIDE 5 MG/1
5 TABLET ORAL 2 TIMES DAILY PRN
Status: DISCONTINUED | OUTPATIENT
Start: 2021-01-01 | End: 2021-01-01 | Stop reason: HOSPADM

## 2021-01-01 RX ORDER — ALLOPURINOL 100 MG/1
100 TABLET ORAL DAILY
Status: DISCONTINUED | OUTPATIENT
Start: 2021-01-01 | End: 2021-01-01

## 2021-01-01 RX ORDER — SODIUM CHLORIDE 9 MG/ML
INJECTION, SOLUTION INTRAVENOUS ONCE
Status: CANCELLED | OUTPATIENT
Start: 2021-01-01 | End: 2021-01-01

## 2021-01-01 RX ORDER — ACETAMINOPHEN 325 MG/1
650 TABLET ORAL EVERY 6 HOURS PRN
Status: DISCONTINUED | OUTPATIENT
Start: 2021-01-01 | End: 2021-01-01 | Stop reason: HOSPADM

## 2021-01-01 RX ORDER — ASPIRIN 81 MG/1
81 TABLET ORAL DAILY
Status: DISCONTINUED | OUTPATIENT
Start: 2021-01-01 | End: 2021-01-01 | Stop reason: HOSPADM

## 2021-01-01 RX ORDER — LEVETIRACETAM 100 MG/ML
750 SOLUTION ORAL DAILY
Status: CANCELLED | OUTPATIENT
Start: 2021-01-01

## 2021-01-01 RX ORDER — SILVER NITRATE 38.21; 12.74 MG/1; MG/1
1 STICK TOPICAL
Status: COMPLETED | OUTPATIENT
Start: 2021-01-01 | End: 2021-01-01

## 2021-01-01 RX ORDER — LORAZEPAM 2 MG/ML
1 INJECTION INTRAMUSCULAR
Status: DISCONTINUED | OUTPATIENT
Start: 2021-01-01 | End: 2021-01-01 | Stop reason: HOSPADM

## 2021-01-01 RX ORDER — AMOXICILLIN 500 MG/1
TABLET, FILM COATED ORAL
Qty: 3 TABLET | Refills: 0 | Status: SHIPPED | OUTPATIENT
Start: 2021-01-01 | End: 2021-01-01

## 2021-01-01 RX ORDER — MUPIROCIN 20 MG/G
OINTMENT TOPICAL 2 TIMES DAILY
Status: DISCONTINUED | OUTPATIENT
Start: 2021-01-01 | End: 2021-01-01 | Stop reason: HOSPADM

## 2021-01-01 RX ORDER — CARVEDILOL 6.25 MG/1
12.5 TABLET ORAL DAILY
Status: DISCONTINUED | OUTPATIENT
Start: 2021-01-01 | End: 2021-01-01 | Stop reason: HOSPADM

## 2021-01-01 RX ORDER — PANTOPRAZOLE SODIUM 40 MG/10ML
40 INJECTION, POWDER, LYOPHILIZED, FOR SOLUTION INTRAVENOUS 2 TIMES DAILY
Status: CANCELLED | OUTPATIENT
Start: 2021-01-01

## 2021-01-01 RX ORDER — CARVEDILOL 6.25 MG/1
12.5 TABLET ORAL DAILY
Status: DISCONTINUED | OUTPATIENT
Start: 2021-01-01 | End: 2021-01-01

## 2021-01-01 RX ORDER — LEVETIRACETAM 100 MG/ML
750 SOLUTION ORAL DAILY
Status: DISCONTINUED | OUTPATIENT
Start: 2021-01-01 | End: 2021-01-01

## 2021-01-01 RX ORDER — PROCHLORPERAZINE EDISYLATE 5 MG/ML
5 INJECTION INTRAMUSCULAR; INTRAVENOUS EVERY 4 HOURS PRN
Status: DISCONTINUED | OUTPATIENT
Start: 2021-01-01 | End: 2021-01-01 | Stop reason: HOSPADM

## 2021-01-01 RX ORDER — ACETAMINOPHEN 325 MG/1
650 TABLET ORAL EVERY 8 HOURS PRN
Status: DISCONTINUED | OUTPATIENT
Start: 2021-01-01 | End: 2021-01-01 | Stop reason: HOSPADM

## 2021-01-01 RX ORDER — AMOXICILLIN 250 MG
1 CAPSULE ORAL DAILY
Status: DISCONTINUED | OUTPATIENT
Start: 2021-01-01 | End: 2021-01-01 | Stop reason: HOSPADM

## 2021-01-01 RX ORDER — HALOPERIDOL 0.5 MG/1
0.5 TABLET ORAL 3 TIMES DAILY PRN
Status: CANCELLED | OUTPATIENT
Start: 2021-01-01

## 2021-01-01 RX ORDER — SCOLOPAMINE TRANSDERMAL SYSTEM 1 MG/1
1 PATCH, EXTENDED RELEASE TRANSDERMAL
Status: DISCONTINUED | OUTPATIENT
Start: 2021-01-01 | End: 2021-01-01 | Stop reason: HOSPADM

## 2021-01-01 RX ORDER — PANTOPRAZOLE SODIUM 40 MG/10ML
40 INJECTION, POWDER, LYOPHILIZED, FOR SOLUTION INTRAVENOUS 2 TIMES DAILY
Status: DISCONTINUED | OUTPATIENT
Start: 2021-01-01 | End: 2021-01-01

## 2021-01-01 RX ORDER — IBUPROFEN 200 MG
16 TABLET ORAL
Status: DISCONTINUED | OUTPATIENT
Start: 2021-01-01 | End: 2021-01-01 | Stop reason: HOSPADM

## 2021-01-01 RX ORDER — AMOXICILLIN AND CLAVULANATE POTASSIUM 500; 125 MG/1; MG/1
1 TABLET, FILM COATED ORAL 2 TIMES DAILY
Status: DISCONTINUED | OUTPATIENT
Start: 2021-01-01 | End: 2021-01-01

## 2021-01-01 RX ORDER — ONDANSETRON 2 MG/ML
8 INJECTION INTRAMUSCULAR; INTRAVENOUS EVERY 4 HOURS PRN
Status: DISCONTINUED | OUTPATIENT
Start: 2021-01-01 | End: 2021-01-01 | Stop reason: HOSPADM

## 2021-01-01 RX ORDER — BUSPIRONE HYDROCHLORIDE 5 MG/1
5 TABLET ORAL 3 TIMES DAILY PRN
Status: DISCONTINUED | OUTPATIENT
Start: 2021-01-01 | End: 2021-01-01

## 2021-01-01 RX ORDER — HALOPERIDOL 0.5 MG/1
0.5 TABLET ORAL 3 TIMES DAILY PRN
Status: DISCONTINUED | OUTPATIENT
Start: 2021-01-01 | End: 2021-01-01

## 2021-01-01 RX ORDER — AMOXICILLIN 250 MG
1 CAPSULE ORAL DAILY
Status: CANCELLED | OUTPATIENT
Start: 2021-01-01

## 2021-01-01 RX ORDER — AMOXICILLIN 250 MG
1 CAPSULE ORAL DAILY
Status: DISCONTINUED | OUTPATIENT
Start: 2021-01-01 | End: 2021-01-01

## 2021-01-01 RX ORDER — BUSPIRONE HYDROCHLORIDE 5 MG/1
5 TABLET ORAL 3 TIMES DAILY PRN
Status: DISCONTINUED | OUTPATIENT
Start: 2021-01-01 | End: 2021-01-01 | Stop reason: HOSPADM

## 2021-01-01 RX ORDER — MORPHINE SULFATE 2 MG/ML
2 INJECTION, SOLUTION INTRAMUSCULAR; INTRAVENOUS
Status: DISCONTINUED | OUTPATIENT
Start: 2021-01-01 | End: 2021-01-01 | Stop reason: HOSPADM

## 2021-01-01 RX ORDER — ATORVASTATIN CALCIUM 40 MG/1
40 TABLET, FILM COATED ORAL DAILY
Status: DISCONTINUED | OUTPATIENT
Start: 2021-01-01 | End: 2021-01-01 | Stop reason: HOSPADM

## 2021-01-01 RX ORDER — MORPHINE SULFATE 2 MG/ML
2 INJECTION, SOLUTION INTRAMUSCULAR; INTRAVENOUS
Status: DISCONTINUED | OUTPATIENT
Start: 2021-01-01 | End: 2021-01-01

## 2021-01-01 RX ORDER — HEPARIN SODIUM 5000 [USP'U]/ML
5000 INJECTION, SOLUTION INTRAVENOUS; SUBCUTANEOUS EVERY 12 HOURS
Status: CANCELLED | OUTPATIENT
Start: 2021-01-01

## 2021-01-01 RX ORDER — HYDRALAZINE HYDROCHLORIDE 25 MG/1
25 TABLET, FILM COATED ORAL EVERY 8 HOURS
Status: CANCELLED | OUTPATIENT
Start: 2021-01-01

## 2021-01-01 RX ORDER — CEPHALEXIN 125 MG/5ML
125 POWDER, FOR SUSPENSION ORAL EVERY 6 HOURS
Qty: 200 ML | Refills: 0 | Status: ON HOLD | OUTPATIENT
Start: 2021-01-01 | End: 2021-01-01 | Stop reason: HOSPADM

## 2021-01-01 RX ORDER — HYDRALAZINE HYDROCHLORIDE 25 MG/1
25 TABLET, FILM COATED ORAL EVERY 8 HOURS
Status: DISCONTINUED | OUTPATIENT
Start: 2021-01-01 | End: 2021-01-01

## 2021-01-01 RX ORDER — SODIUM CHLORIDE 0.9 % (FLUSH) 0.9 %
2 SYRINGE (ML) INJECTION
Status: DISCONTINUED | OUTPATIENT
Start: 2021-01-01 | End: 2021-01-01 | Stop reason: HOSPADM

## 2021-01-01 RX ORDER — HALOPERIDOL 0.5 MG/1
0.5 TABLET ORAL 3 TIMES DAILY PRN
Status: DISCONTINUED | OUTPATIENT
Start: 2021-01-01 | End: 2021-01-01 | Stop reason: HOSPADM

## 2021-01-01 RX ORDER — IPRATROPIUM BROMIDE AND ALBUTEROL SULFATE 2.5; .5 MG/3ML; MG/3ML
3 SOLUTION RESPIRATORY (INHALATION) EVERY 4 HOURS PRN
Status: DISCONTINUED | OUTPATIENT
Start: 2021-01-01 | End: 2021-01-01 | Stop reason: HOSPADM

## 2021-01-01 RX ORDER — IPRATROPIUM BROMIDE AND ALBUTEROL SULFATE 2.5; .5 MG/3ML; MG/3ML
3 SOLUTION RESPIRATORY (INHALATION)
Status: DISCONTINUED | OUTPATIENT
Start: 2021-01-01 | End: 2021-01-01

## 2021-01-01 RX ORDER — ONDANSETRON 2 MG/ML
4 INJECTION INTRAMUSCULAR; INTRAVENOUS EVERY 6 HOURS PRN
Status: DISCONTINUED | OUTPATIENT
Start: 2021-01-01 | End: 2021-01-01 | Stop reason: HOSPADM

## 2021-01-01 RX ORDER — PANTOPRAZOLE SODIUM 40 MG/10ML
40 INJECTION, POWDER, LYOPHILIZED, FOR SOLUTION INTRAVENOUS 2 TIMES DAILY
Status: DISCONTINUED | OUTPATIENT
Start: 2021-01-01 | End: 2021-01-01 | Stop reason: HOSPADM

## 2021-01-01 RX ORDER — HYDRALAZINE HYDROCHLORIDE 25 MG/1
25 TABLET, FILM COATED ORAL EVERY 12 HOURS
Status: DISCONTINUED | OUTPATIENT
Start: 2021-01-01 | End: 2021-01-01 | Stop reason: HOSPADM

## 2021-01-01 RX ORDER — IBUPROFEN 200 MG
24 TABLET ORAL
Status: DISCONTINUED | OUTPATIENT
Start: 2021-01-01 | End: 2021-01-01 | Stop reason: HOSPADM

## 2021-01-01 RX ORDER — MUPIROCIN 20 MG/G
OINTMENT TOPICAL 2 TIMES DAILY
Status: CANCELLED | OUTPATIENT
Start: 2021-01-01 | End: 2021-03-23

## 2021-01-01 RX ORDER — INSULIN ASPART 100 [IU]/ML
0-5 INJECTION, SOLUTION INTRAVENOUS; SUBCUTANEOUS
Status: DISCONTINUED | OUTPATIENT
Start: 2021-01-01 | End: 2021-01-01 | Stop reason: HOSPADM

## 2021-01-01 RX ORDER — ATROPINE SULFATE 10 MG/ML
2 SOLUTION/ DROPS OPHTHALMIC EVERY 4 HOURS PRN
Status: DISCONTINUED | OUTPATIENT
Start: 2021-01-01 | End: 2021-01-01 | Stop reason: HOSPADM

## 2021-01-01 RX ORDER — SODIUM CHLORIDE 9 MG/ML
INJECTION, SOLUTION INTRAVENOUS ONCE
Status: DISCONTINUED | OUTPATIENT
Start: 2021-01-01 | End: 2021-01-01

## 2021-01-01 RX ORDER — ACETAMINOPHEN 650 MG/1
650 SUPPOSITORY RECTAL EVERY 6 HOURS PRN
Status: DISCONTINUED | OUTPATIENT
Start: 2021-01-01 | End: 2021-01-01 | Stop reason: HOSPADM

## 2021-01-01 RX ORDER — MUPIROCIN 20 MG/G
OINTMENT TOPICAL 2 TIMES DAILY
Status: DISCONTINUED | OUTPATIENT
Start: 2021-01-01 | End: 2021-01-01

## 2021-01-01 RX ORDER — TRANEXAMIC ACID 100 MG/ML
INJECTION, SOLUTION INTRAVENOUS
Status: CANCELLED | OUTPATIENT
Start: 2021-01-01

## 2021-01-01 RX ORDER — HALOPERIDOL 5 MG/ML
2 INJECTION INTRAMUSCULAR EVERY 4 HOURS PRN
Status: DISCONTINUED | OUTPATIENT
Start: 2021-01-01 | End: 2021-01-01 | Stop reason: HOSPADM

## 2021-01-01 RX ORDER — SODIUM CHLORIDE 9 MG/ML
INJECTION, SOLUTION INTRAVENOUS
Status: DISCONTINUED | OUTPATIENT
Start: 2021-01-01 | End: 2021-01-01 | Stop reason: HOSPADM

## 2021-01-01 RX ADMIN — HYDRALAZINE HYDROCHLORIDE 25 MG: 25 TABLET, FILM COATED ORAL at 02:03

## 2021-01-01 RX ADMIN — EPOETIN ALFA-EPBX 4220 UNITS: 10000 INJECTION, SOLUTION INTRAVENOUS; SUBCUTANEOUS at 09:03

## 2021-01-01 RX ADMIN — HYDRALAZINE HYDROCHLORIDE 25 MG: 25 TABLET, FILM COATED ORAL at 05:03

## 2021-01-01 RX ADMIN — APIXABAN 2.5 MG: 2.5 TABLET, FILM COATED ORAL at 09:03

## 2021-01-01 RX ADMIN — HALOPERIDOL LACTATE 2 MG: 5 INJECTION, SOLUTION INTRAMUSCULAR at 10:03

## 2021-01-01 RX ADMIN — LEVETIRACETAM 750 MG: 100 SOLUTION ORAL at 08:03

## 2021-01-01 RX ADMIN — LEVETIRACETAM 750 MG: 100 SOLUTION ORAL at 03:03

## 2021-01-01 RX ADMIN — HALOPERIDOL 0.5 MG: 0.5 TABLET ORAL at 08:03

## 2021-01-01 RX ADMIN — CEFTRIAXONE 1 G: 1 INJECTION, SOLUTION INTRAVENOUS at 04:01

## 2021-01-01 RX ADMIN — MORPHINE SULFATE 2 MG: 2 INJECTION, SOLUTION INTRAMUSCULAR; INTRAVENOUS at 10:03

## 2021-01-01 RX ADMIN — ALLOPURINOL 100 MG: 100 TABLET ORAL at 10:03

## 2021-01-01 RX ADMIN — BUSPIRONE HYDROCHLORIDE 5 MG: 5 TABLET ORAL at 08:03

## 2021-01-01 RX ADMIN — SCOPALAMINE 1 PATCH: 1 PATCH, EXTENDED RELEASE TRANSDERMAL at 07:03

## 2021-01-01 RX ADMIN — ATORVASTATIN CALCIUM 40 MG: 40 TABLET, FILM COATED ORAL at 09:03

## 2021-01-01 RX ADMIN — LORAZEPAM 1 MG: 2 INJECTION INTRAMUSCULAR; INTRAVENOUS at 11:03

## 2021-01-01 RX ADMIN — PANTOPRAZOLE SODIUM 40 MG: 40 INJECTION, POWDER, FOR SOLUTION INTRAVENOUS at 10:03

## 2021-01-01 RX ADMIN — MUPIROCIN: 20 OINTMENT TOPICAL at 08:03

## 2021-01-01 RX ADMIN — MORPHINE SULFATE 10 MG/HR: 10 INJECTION, SOLUTION INTRAMUSCULAR; INTRAVENOUS at 11:03

## 2021-01-01 RX ADMIN — MUPIROCIN: 20 OINTMENT TOPICAL at 09:03

## 2021-01-01 RX ADMIN — HALOPERIDOL LACTATE 2 MG: 5 INJECTION, SOLUTION INTRAMUSCULAR at 06:03

## 2021-01-01 RX ADMIN — PIPERACILLIN AND TAZOBACTAM 4.5 G: 4; .5 INJECTION, POWDER, LYOPHILIZED, FOR SOLUTION INTRAVENOUS; PARENTERAL at 07:03

## 2021-01-01 RX ADMIN — MORPHINE SULFATE 2 MG: 2 INJECTION, SOLUTION INTRAMUSCULAR; INTRAVENOUS at 12:03

## 2021-01-01 RX ADMIN — HYDRALAZINE HYDROCHLORIDE 25 MG: 25 TABLET, FILM COATED ORAL at 09:03

## 2021-01-01 RX ADMIN — CARVEDILOL 12.5 MG: 6.25 TABLET, FILM COATED ORAL at 09:03

## 2021-01-01 RX ADMIN — CARVEDILOL 12.5 MG: 6.25 TABLET, FILM COATED ORAL at 03:03

## 2021-01-01 RX ADMIN — ACETAMINOPHEN 650 MG: 325 TABLET ORAL at 06:03

## 2021-01-01 RX ADMIN — LEVETIRACETAM 750 MG: 100 SOLUTION ORAL at 09:03

## 2021-01-01 RX ADMIN — ALLOPURINOL 100 MG: 100 TABLET ORAL at 09:03

## 2021-01-01 RX ADMIN — AMOXICILLIN AND CLAVULANATE POTASSIUM 500 MG: 500; 125 TABLET, FILM COATED ORAL at 10:03

## 2021-01-01 RX ADMIN — AMOXICILLIN AND CLAVULANATE POTASSIUM 500 MG: 500; 125 TABLET, FILM COATED ORAL at 08:03

## 2021-01-01 RX ADMIN — PANTOPRAZOLE SODIUM 40 MG: 40 INJECTION, POWDER, FOR SOLUTION INTRAVENOUS at 09:03

## 2021-01-01 RX ADMIN — APIXABAN 2.5 MG: 2.5 TABLET, FILM COATED ORAL at 08:03

## 2021-01-01 RX ADMIN — HALOPERIDOL 0.5 MG: 0.5 TABLET ORAL at 03:03

## 2021-01-01 RX ADMIN — CARVEDILOL 12.5 MG: 6.25 TABLET, FILM COATED ORAL at 08:03

## 2021-01-01 RX ADMIN — DOCUSATE SODIUM 50 MG AND SENNOSIDES 8.6 MG 1 TABLET: 8.6; 5 TABLET, FILM COATED ORAL at 10:03

## 2021-01-01 RX ADMIN — PIPERACILLIN AND TAZOBACTAM 4.5 G: 4; .5 INJECTION, POWDER, LYOPHILIZED, FOR SOLUTION INTRAVENOUS; PARENTERAL at 02:03

## 2021-01-01 RX ADMIN — APIXABAN 2.5 MG: 2.5 TABLET, FILM COATED ORAL at 03:03

## 2021-01-01 RX ADMIN — ALLOPURINOL 100 MG: 100 TABLET ORAL at 08:03

## 2021-01-01 RX ADMIN — HYDRALAZINE HYDROCHLORIDE 25 MG: 25 TABLET, FILM COATED ORAL at 08:03

## 2021-01-01 RX ADMIN — BUSPIRONE HYDROCHLORIDE 5 MG: 5 TABLET ORAL at 10:03

## 2021-01-01 RX ADMIN — DOCUSATE SODIUM 50 MG AND SENNOSIDES 8.6 MG 1 TABLET: 8.6; 5 TABLET, FILM COATED ORAL at 09:03

## 2021-01-01 RX ADMIN — Medication 2 SPRAY: at 09:03

## 2021-01-01 RX ADMIN — PANTOPRAZOLE SODIUM 40 MG: 40 INJECTION, POWDER, FOR SOLUTION INTRAVENOUS at 08:03

## 2021-01-01 RX ADMIN — MORPHINE SULFATE 2 MG: 2 INJECTION, SOLUTION INTRAMUSCULAR; INTRAVENOUS at 11:03

## 2021-01-01 RX ADMIN — IOHEXOL 100 ML: 350 INJECTION, SOLUTION INTRAVENOUS at 08:03

## 2021-01-01 RX ADMIN — ATORVASTATIN CALCIUM 40 MG: 40 TABLET, FILM COATED ORAL at 03:03

## 2021-01-01 RX ADMIN — CARVEDILOL 12.5 MG: 6.25 TABLET, FILM COATED ORAL at 10:03

## 2021-01-01 RX ADMIN — ATORVASTATIN CALCIUM 40 MG: 40 TABLET, FILM COATED ORAL at 10:03

## 2021-01-01 RX ADMIN — SILVER NITRATE APPLICATORS 1 APPLICATOR: 25; 75 STICK TOPICAL at 11:03

## 2021-01-01 RX ADMIN — LEVETIRACETAM 750 MG: 100 SOLUTION ORAL at 10:03

## 2021-01-01 RX ADMIN — LEVETIRACETAM 750 MG: 100 SOLUTION ORAL at 06:03

## 2021-01-01 RX ADMIN — MORPHINE SULFATE 2 MG/HR: 10 INJECTION, SOLUTION INTRAMUSCULAR; INTRAVENOUS at 02:03

## 2021-01-01 RX ADMIN — AMOXICILLIN AND CLAVULANATE POTASSIUM 500 MG: 500; 125 TABLET, FILM COATED ORAL at 09:03

## 2021-01-01 RX ADMIN — CEFTRIAXONE SODIUM 1 G: 1 INJECTION, POWDER, FOR SOLUTION INTRAMUSCULAR; INTRAVENOUS at 08:03

## 2021-01-01 RX ADMIN — CLONIDINE HYDROCHLORIDE 0.1 MG: 0.1 TABLET ORAL at 11:03

## 2021-01-01 RX ADMIN — CARVEDILOL 12.5 MG: 6.25 TABLET, FILM COATED ORAL at 11:03

## 2021-01-01 RX ADMIN — ASPIRIN 81 MG: 81 TABLET, COATED ORAL at 03:03

## 2021-01-01 RX ADMIN — ALLOPURINOL 100 MG: 100 TABLET ORAL at 03:03

## 2021-01-01 RX ADMIN — LORAZEPAM 1 MG: 2 INJECTION INTRAMUSCULAR; INTRAVENOUS at 09:03

## 2021-01-01 RX ADMIN — ASPIRIN 81 MG: 81 TABLET, COATED ORAL at 08:03

## 2021-01-01 RX ADMIN — HALOPERIDOL LACTATE 2.5 MG: 5 INJECTION, SOLUTION INTRAMUSCULAR at 12:03

## 2021-01-01 RX ADMIN — HALOPERIDOL LACTATE 2.5 MG: 5 INJECTION, SOLUTION INTRAMUSCULAR at 01:03

## 2021-01-01 RX ADMIN — PANTOPRAZOLE SODIUM 40 MG: 40 INJECTION, POWDER, FOR SOLUTION INTRAVENOUS at 04:03

## 2021-01-01 RX ADMIN — MUPIROCIN: 20 OINTMENT TOPICAL at 10:03

## 2021-01-01 RX ADMIN — CEFTRIAXONE SODIUM 1 G: 1 INJECTION, POWDER, FOR SOLUTION INTRAMUSCULAR; INTRAVENOUS at 09:03

## 2021-01-01 RX ADMIN — LORAZEPAM 1 MG: 2 INJECTION INTRAMUSCULAR; INTRAVENOUS at 05:03

## 2021-01-01 RX ADMIN — MORPHINE SULFATE 2 MG: 2 INJECTION, SOLUTION INTRAMUSCULAR; INTRAVENOUS at 08:03

## 2021-02-23 NOTE — TELEPHONE ENCOUNTER
----- Message from Yony Anguiano sent at 1/16/2020  7:57 AM CST -----  Contact: ARTEM WEI   Name of Who is Calling: ARTEM WEI       What is the request in detail: Patient's daughter is requesting an appointment on 01/27/2020 for either 10:30 am or 11 am. Please contact to further advise.      Can the clinic reply by [facility]: NO      [facility]: [phone]                                     Patient: Peter Hernandez II    Procedure(s):  RIGHT TOTAL HIP ARTHROPLASTY    Diagnosis:Primary osteoarthritis of right hip [M16.11]  Diagnosis Additional Information: No value filed.    Anesthesia Type:  Spinal    Note:  Disposition: Inpatient   Postop Pain Control: Uneventful            Sign Out: Well controlled pain   PONV: No   Neuro/Psych: Uneventful            Sign Out: Acceptable/Baseline neuro status   Airway/Respiratory: Uneventful            Sign Out: Acceptable/Baseline resp. status   CV/Hemodynamics: Uneventful            Sign Out: Acceptable CV status   Other NRE: NONE   DID A NON-ROUTINE EVENT OCCUR? No    Event details/Postop Comments:  Recovery for SAB/RA not complete but expected to resolve within 48 hours.         Last vitals:  Vitals:    02/23/21 1321 02/23/21 1422 02/23/21 1530   BP: 113/69 128/70 114/61   Pulse: 67 66 61   Resp: 18 18 18   Temp: 36.3  C (97.4  F) 36.4  C (97.6  F) 36.3  C (97.4  F)   SpO2: 97% 97% 96%       Last vitals prior to Anesthesia Care Transfer:  CRNA VITALS  2/23/2021 1010 - 2/23/2021 1110      2/23/2021             Resp Rate (set):  10          Electronically Signed By: Eric Beltre MD  February 23, 2021  5:30 PM

## 2021-03-05 PROBLEM — N39.0 RECURRENT UTI: Status: ACTIVE | Noted: 2021-01-01

## 2021-03-08 PROBLEM — R55 NEAR SYNCOPE: Status: ACTIVE | Noted: 2021-01-01

## 2021-03-09 PROBLEM — E44.0 MODERATE MALNUTRITION: Status: ACTIVE | Noted: 2018-11-06

## 2021-03-10 PROBLEM — J69.0 ASPIRATION PNEUMONIA: Status: ACTIVE | Noted: 2021-01-01

## 2021-03-11 PROBLEM — R00.1 BRADYCARDIA: Status: ACTIVE | Noted: 2021-01-01

## 2021-03-17 PROBLEM — R04.0 EPISTAXIS: Status: ACTIVE | Noted: 2021-01-01

## 2021-03-17 PROBLEM — K92.1 MELENA: Status: ACTIVE | Noted: 2021-01-01

## 2021-03-17 PROBLEM — K92.2 GI BLEED: Status: ACTIVE | Noted: 2021-01-01

## 2021-03-17 PROBLEM — I50.43 ACUTE ON CHRONIC COMBINED SYSTOLIC AND DIASTOLIC HEART FAILURE: Status: RESOLVED | Noted: 2018-11-07 | Resolved: 2021-01-01

## 2021-03-19 PROBLEM — N18.6 ESRD (END STAGE RENAL DISEASE): Status: ACTIVE | Noted: 2021-01-01

## 2021-03-19 PROBLEM — Z51.5 COMFORT MEASURES ONLY STATUS: Status: ACTIVE | Noted: 2021-01-01

## 2021-03-20 PROBLEM — N18.6 ESRD (END STAGE RENAL DISEASE): Status: RESOLVED | Noted: 2021-01-01 | Resolved: 2021-01-01

## 2021-03-23 ENCOUNTER — TELEPHONE (OUTPATIENT)
Dept: MEDSURG UNIT | Facility: HOSPITAL | Age: 81
End: 2021-03-23

## 2021-03-24 ENCOUNTER — DOCUMENT SCAN (OUTPATIENT)
Dept: HOME HEALTH SERVICES | Facility: HOSPITAL | Age: 81
End: 2021-03-24
Payer: MEDICARE

## 2021-05-14 ENCOUNTER — EXTERNAL HOME HEALTH (OUTPATIENT)
Dept: HOME HEALTH SERVICES | Facility: HOSPITAL | Age: 81
End: 2021-05-14
Payer: MEDICARE

## 2021-06-11 NOTE — TELEPHONE ENCOUNTER
we   Received request via: Pharmacy    Was the patient seen in the last year in this department? Yes    Does the patient have an active prescription (recently filled or refills available) for medication(s) requested? No

## 2021-10-11 NOTE — PROGRESS NOTES
Computerized Dynamic Posturography performed.    See accompanying Encounter note this date for results.    Sandrine Fair, CCC-A  Audiologist     agitation

## 2022-05-16 NOTE — PROGRESS NOTES
CC: Pain of the Right Knee      HPI: Pt with right knee and low back/hip pain for the past few weeks. The pain in the lower back is radiating to the buttock. It is aching and sharp and worse after driving in to work from [place]. It started after she found out she was wearing her knee brace on the right knee incorrectly and she's afraid that she may have caused the back pain. She also has right knee pain which is aching and there is associated stiffness after sitting for a long period of time. She has taken dicolfenac with some relief, but she has had better relief with cortisone injections in the past. This past weekend [place] and was able to hit balls and play golf. The back pain is definitely more pronounced right now than the knee pain. She is able to ambulate, but there is pain especially when she first starts to walk and she takes a minute to get straight. The pain is causing her to limp which has been very concerning to her .    ROS  General: denies fever and chills  Resp: no c/o sob  CVS: no c/o cp  MSK: c/o low back and right knee pain    PE  General: AAOx3, pleasant and cooperative  Resp: respirations even and unlabored  MSK: right knee exam  0 degrees extension  120 degrees flexion  No warmth or erythema   - effusion  + tenderness over the medial joint line  + crepitus  5/5 quad strength    Xray:  Reviewed by me with the patient: right knee: there is advanced arthritis with medial joint space narrowing noted    Lumbar spine: reviewed by me with the patient: Multilevel moderate to severe disc space narrowing, most prominent at L3-L4 and L5-S1. Moderate facet arthropathy within the lower lumbar spine    Assessment:  Right knee djd  Lumbar spine djd    Plan:  Continue diclofenac for knee pain for now  Medrol dose pack for sciatica  MRI lumbar spine for further evaluation  Call with MRI results and f/u with RE[facility] Vaca in back and spine (she last saw Dr. Blanchard in  HPI:  Patient is a 76 y.o. year old male w. B/l knee oa s/p total left knee replacement. He is s/p right synvisc and right saphenous nerve block +hydrodissection. His right knee pain has resolved. His left knee is hurting.   He had his left saphenous nerve block+hyrdodissection done in April and it lasted until now. He is requesting his left knee pain to be addressed today.Back is doing well after cluneal nerve block+hydrodissection.  Labs  egfr 20 cr. 3.2  Past Medical History:   Diagnosis Date    Allergy     Arthritis     Gout    BPH (benign prostatic hyperplasia)     CAD (coronary artery disease) 2006    Chronic kidney disease     due to ibuprofen    Colon polyp     Diverticulosis     GERD (gastroesophageal reflux disease)     HTN (hypertension) 3/27/2012    Hyperlipidemia     LVH (left ventricular hypertrophy)     Murmur, cardiac 3/27/2012    GRICELDA (obstructive sleep apnea)     DOES NOT USE A MACHINE    Sinus problem     Syncope and collapse      Past Surgical History:   Procedure Laterality Date    APPENDECTOMY      CARDIAC CATHETERIZATION      COLONOSCOPY  2011    CORONARY ARTERY BYPASS GRAFT  4/2007    x 1    JOINT REPLACEMENT      left knee total replacement  X 3    mid leftt finger      from a cactuss    MOLE REMOVAL  2016    rotative cuff      no rotative cuffs on bilat shoulders has pins     SHOULDER SURGERY      shoulder surgery bilat  RIGHT X 4; LEFT X 3     Family History   Problem Relation Age of Onset    Heart disease Mother     Sudden death Father     Stroke Sister     Heart disease Sister     Kidney cancer Neg Hx     Prostate cancer Neg Hx     Urolithiasis Neg Hx     Allergic rhinitis Neg Hx     Allergies Neg Hx     Angioedema Neg Hx     Asthma Neg Hx     Atopy Neg Hx     Eczema Neg Hx     Immunodeficiency Neg Hx     Rhinitis Neg Hx     Urticaria Neg Hx      Social History     Social History    Marital status:      Spouse name: N/A    Number of  2019)     children: N/A    Years of education: N/A     Social History Main Topics    Smoking status: Never Smoker    Smokeless tobacco: Never Used    Alcohol use Yes      Comment: rare    Drug use: No    Sexual activity: Not on file     Other Topics Concern    Not on file     Social History Narrative    No narrative on file       Review of patient's allergies indicates:   Allergen Reactions    Eplerenone Other (See Comments)     Marked bradycardia, 40, tiredness and weakness         Current Outpatient Prescriptions:     albuterol-ipratropium 2.5mg-0.5mg/3mL (DUO-NEB) 0.5 mg-3 mg(2.5 mg base)/3 mL nebulizer solution, Take 3 mLs by nebulization every 6 (six) hours as needed for Wheezing. Rescue, Disp: 30 vial, Rfl: 0    allopurinol (ZYLOPRIM) 300 MG tablet, Take 1 tablet (300 mg total) by mouth once daily., Disp: 90 tablet, Rfl: 3    amlodipine (NORVASC) 10 MG tablet, Take 1 tablet (10 mg total) by mouth before dinner., Disp: 90 tablet, Rfl: 3    aspirin (ECOTRIN) 81 MG EC tablet, Take 81 mg by mouth once daily.  , Disp: , Rfl:     atorvastatin (LIPITOR) 80 MG tablet, Take 1 tablet (80 mg total) by mouth once daily., Disp: 90 tablet, Rfl: 3    azelastine (ASTELIN) 137 mcg (0.1 %) nasal spray, USE 2 SPRAYS TWICE DAILY IN EACH NOSTRIL, Disp: 30 mL, Rfl: 5    budesonide-formoterol 160-4.5 mcg (SYMBICORT) 160-4.5 mcg/actuation HFAA, Inhale 1 puff into the lungs every 12 (twelve) hours. Controller, Disp: 2 Inhaler, Rfl: 3    calcium-vitamin D 500-125 mg-unit tablet, Take 1 tablet by mouth as needed. , Disp: , Rfl:     carvedilol (COREG) 6.25 MG tablet, Take 1 tablet (6.25 mg total) by mouth 2 (two) times daily with meals., Disp: 90 tablet, Rfl: 3    colchicine 0.6 mg tablet, Take 1 tablet (0.6 mg total) by mouth 2 (two) times daily., Disp: 10 tablet, Rfl: 3    cyanocobalamin, vitamin B-12, (VITAMIN B-12) 1,000 mcg Subl, Take 1 tablet by mouth daily as needed. Takes 3,000mcg, Disp: , Rfl:     doxycycline  (VIBRAMYCIN) 100 MG Cap, Take 1 capsule (100 mg total) by mouth every 12 (twelve) hours., Disp: 20 capsule, Rfl: 1    finasteride (PROSCAR) 5 mg tablet, TAKE 1 TABLET ONCE DAILY., Disp: 90 tablet, Rfl: 3    fluticasone (FLONASE) 50 mcg/actuation nasal spray, INHALE 1 SPRAY BY EACH NARE ONCE DAILY., Disp: 3 Bottle, Rfl: 3    inhalation device (AEROCHAMBER PLUS FLOW-VU), Use as directed for inhalation., Disp: 1 Device, Rfl: 0    meclizine (ANTIVERT) 25 mg tablet, , Disp: , Rfl:     terazosin (HYTRIN) 5 MG capsule, Take 1 capsule (5 mg total) by mouth every evening., Disp: 90 capsule, Rfl: 0    tramadol (ULTRAM) 50 mg tablet, Take 1 tablet (50 mg total) by mouth every 6 (six) hours as needed., Disp: 120 tablet, Rfl: 4    valsartan (DIOVAN) 320 MG tablet, Take 1 tablet (320 mg total) by mouth once daily., Disp: 90 tablet, Rfl: 3    nitroGLYCERIN (NITROSTAT) 0.4 MG SL tablet, Place 1 tablet (0.4 mg total) under the tongue every 5 (five) minutes as needed for Chest pain., Disp: 25 tablet, Rfl: 4    omeprazole (PRILOSEC) 40 MG capsule, Take 1 capsule (40 mg total) by mouth 2 (two) times daily before meals., Disp: 90 capsule, Rfl: 0      \  Review of Systems  No nausea, vomiting, fevers, Chills , contipation, diarrhea or sweats      Physical Exam:      Vitals:    07/25/17 1021   BP: (!) 147/86   Pulse: 71     alert and oriented ×4 follows commands answers all questions appropriately  +knee swelling left  Dec rom of left knee  Healed incision scar  Antalgic gait  No knee effusion no clubbing cyanosis       Assessment:  S/p left TKR w. Superimposed saphenous neuralgia  Right knee oa  CKD 3  Plan:  Trial of voltaren gel 2g  twice a day- avoid oral meds d/t kidneys  sapheneous nerve block to left today        pROCEDURE NOTE: risk and benefit of left sAPHENOUS NERVE BLOCK AND HYDRODISEECTION injection reviewed with. patient. Verbal consent was obtained. Injection was performed after sterile prep w. Betadine. MEDIAL  approach used. ALONG  EDGE OF VASTUS MEDIALIS MUSCLE. 25g 2 inch needle used hydrodissecting along vastus medialis facial plane   Ultrasound guidance was utilized for needle visualization. A TOTAL OF 10ML OF LIDOCAINE 1% AND 10ML OF STERILE NORMAL SALINE WAS UTILIZED. No complications. iMMEDIATE IMPROVEMENT OF KNEE PAIN POST PROCEDURE     Ultrasound interpretation was performed prior to the procedure to identify the target and any adjacent neurovascular structures.  Subsequently, interpretation was performed during real- time needle guidance confirming placement. Post- intervention interpretation was also performed confirming appropriate injectate flow and hemostasis.  Images indicating needle placement have been saved in [employer].

## 2023-03-05 NOTE — ASSESSMENT & PLAN NOTE
Nephrology Consult  Hemodialysis MWF  Monitor Renal Panel  Adjust meds and treatments to renal function   Discharged

## 2023-03-23 NOTE — TELEPHONE ENCOUNTER
Advised patient to come in today when he finishes his chest xray.   Rg Nguyen)  Orthopaedic Surgery; Surgery of the Hand  [address], [address]  [place] [address]  Phone: [phone]  Fax: [phone]  Follow Up Time:     Marisela Smart)  Orthopaedic Surgery  [address] [facility] [address], [address] [place] [address]  Phone: [phone]  Fax: [phone]  Follow Up Time:

## 2023-03-27 NOTE — ASSESSMENT & PLAN NOTE
Presents with one day onset of fever and cough.  Recent hospitalization 3 weeks ago.    Supplemental oxygen.  IV Vanc and Zosyn - renal dosing.  Sputum for culture.  Consult Pulmonology - follow recommendations.  Duo neb treatments  procalcitonin negative.     Hemigard Postcare Instructions: The HEMIGARD strips are to remain completely dry for at least 5-7 days.

## 2023-06-12 NOTE — ASSESSMENT & PLAN NOTE
Continue current secondary prevention measures  Start elevating the head of the bed gradually and monitor for signs of cerebral hypoperfusion      Fall

## 2023-12-03 NOTE — HPI
Patient is a 79 yo male with PMHCAD s/p CABG 2007, ESRD, and Asthma transferred initially for evaluation of unstable angina until becoming symptomatic with R side weakness and dysarthria. He is currently in Neuro ICU for stroke/ seizure workup. Ortho consulted as r/o compartment syndrome. Patient had extravasation from arterial line early this AM causing swelling of RUE. Per family swelling has improved since AM. Patient in minimal pain and is controlled with minimal pain medication at this time. Denies paresthesias.        Opt out

## 2024-07-22 NOTE — TELEPHONE ENCOUNTER
Patient wanted to schedule an appt due to left shoulder pain.  appt scheduled for 5/9/18   workup unremarkable, will dc

## 2024-09-13 NOTE — H&P (VIEW-ONLY)
-- DO NOT REPLY / DO NOT REPLY ALL --  -- This inbox is not monitored. If this was sent to the wrong provider or department, reroute message to P [facility] Reroute pool. --  -- Message is from Engagement Center Operations ([facility]) --    Offered Waitlist if Available for the Visit Type? Yes    Caller is requesting an appointment.    Reason for Appointment Message:  Caller wants sooner New patient appointment - all locations with clinician were offered    Reason for Visit: New patient/ F/U Type 2 diabetes    Is the patient currently scheduled? Yes.  Appointment date:  4/2/25 at 1500    Preferred time to be seen: Any day as late as possible    Caller Information       Contact Date/Time Type Contact Phone/Fax    09/13/2024 02:57 PM CDT Phone (Incoming) Gabriel Mariee ([facility]) [phone] ()            Alternative phone number: N/A    Can a detailed message be left?  Yes - Voicemail   Patient has been advised the message will be addressed within 2-3 business days          Subjective:       Patient ID: Micheal Hunt is a 80 y.o. male.    This is an established patient.      Chief Complaint: Dysphagia    PAST HISTORY:    Patient seen for dysphagia, occurring mostly with solid foods, frequency often, alleviated/exacerbated by nothing in particular, associated signs/symptoms including choking, onset remote.  He has had a CVA and is wheelchair bound.  His family member is with him and relates most of the history, including that of cervical bone spurs for which he was supposed to see surgery.  He has not had recent EGD however, on past exam he had gastric nodules followed by EUS for which he is now due.  No other acute complaints.        INTERVAL HISTORY:  Patient presents with caregiver for evaluation for PEG tube malfunction, characterized by leaking around tube and past dislodgement with nocturnal feedings.  Patient and caregiver would like to get measured for YAMIL-KEY tube.  No pain at site.  No other problems.  He is able to take some feeding orally but still uses the tube for nutrition.    Review of Systems   HENT: Positive for trouble swallowing.    Respiratory: Negative for cough, shortness of breath and wheezing.    Cardiovascular: Negative for chest pain and palpitations.   Skin: Negative for color change.   Neurological: Negative for numbness.   Psychiatric/Behavioral: Negative for confusion. The patient is not nervous/anxious.    All other systems reviewed and are negative.      Objective:       Vitals:    10/22/20 1529   BP: 128/82   Pulse: 76       Physical Exam  Vitals signs reviewed.   Constitutional:       Appearance: He is well-developed.      Comments: Wheelchair bound     HENT:      Head: Normocephalic and atraumatic.   Eyes:      General: No scleral icterus.     Pupils: Pupils are equal, round, and reactive to light.   Neck:      Musculoskeletal: Normal range of motion and neck supple.      Thyroid: No thyromegaly.   Cardiovascular:      Rate and Rhythm: Normal  rate and regular rhythm.      Heart sounds: No murmur.   Pulmonary:      Effort: Pulmonary effort is normal.      Breath sounds: Normal breath sounds. No wheezing.   Abdominal:      General: Bowel sounds are normal. There is no distension.      Palpations: Abdomen is soft.      Tenderness: There is no abdominal tenderness.      Comments: Tube site C/D/I.  External bumper at 2 cm   Lymphadenopathy:      Cervical: No cervical adenopathy.   Neurological:      Mental Status: He is alert.      Comments: + RUE/RLE weakness             Lab Results   Component Value Date    WBC 6.93 05/08/2019    HGB 9.4 (L) 05/08/2019    HCT 27.8 (L) 05/08/2019    MCV 95 05/08/2019     (L) 05/08/2019         Past EUS and EGD reports reviewed.      Assessment:       1. PEG (percutaneous endoscopic gastrostomy) status    2. Personal history of colonic polyps    3. Sigmoid diverticulitis, 3 episodes, 2005, 2015, 2018        Plan:       1.  Antireflux precautions including avoidance of late night eating and lying down soon after eating.     2.  Will order 20 Fr x 2 cm YAMIL-KEY tube.  3.  Schedule for EGD once tube arrives  4.  Further recommendations to follow after above.

## 2025-07-22 NOTE — CONSULTS
[facility]  Pulmonology  Consult Note    Patient Name: Micheal Hunt  MRN: [id number]  Admission Date: 3/22/2019  Hospital Length of Stay: 3 days  Code Status: Full Code  Attending Physician: Jean Paul Watson MD  Primary Care Provider: Pk Lakhani MD   Principal Problem: Acute ischemic left PCA stroke    Inpatient consult to Pulmonology  Consult performed by: Tim Flowers MD  Consult ordered by: Constance Grimes MD        Subjective:     HPI:  Mr. Hunt is a 77yo male with a PMHx of asthma, HTN, CAD s/p CABG 2007, AF, HFpEF (LVEF 55%) with severe biatrial enlargement and PFO, DM and ESRD. Pt was initially cared for at [facility] where he was being evaluated for unstable angina and SOB. Pt was evaluated and treated by pulmonary with bronchodilators and steroids, but his wheeze and SOB did not readily respond. He was seen by cardiology with a tentative plan to cath him based on a pre-transfer consultation. Transferred to [facility] for the catheterization on 3/22.    On 3/24, pt noted to be more lethargic, disoriented, with left facial droop and right-sided weakness. Stroke code => CT => MRI showed acute left PCA infarct. Transferred to neuro-ICU for further management.     Pulmonary consulted for asthma. Currently noted to be 98% on room air.        Past Medical History:   Diagnosis Date    NICK (acute kidney injury) 7/29/2016    Allergy     Anemia, mild 12/15/2014    Arthritis     Gout    Benign essential HTN 3/27/2012    BMI 29.0-29.9,adult 5/10/2018    BPH (benign prostatic hyperplasia)     BPH (benign prostatic hyperplasia)     CAD (coronary artery disease) 2006    Chronic kidney disease     due to ibuprofen    Colon polyp     CRF (chronic renal failure), stage 5     Diverticulosis     Gastritis     GERD (gastroesophageal reflux disease)     Gout     History of colon polyps 5/3/2018    HTN (hypertension) 3/27/2012    Hyperlipidemia     Hyperlipidemia     LLL  mg/dL    BUN 11 6 - 20 mg/dL    Creatinine 0.8 0.7 - 1.2 mg/dL    Est, Glom Filt Rate >90 >60 mL/min/1.73m2    Calcium 9.1 8.6 - 10.0 mg/dL    Total Protein 7.3 6.4 - 8.3 g/dL    Albumin 3.7 3.5 - 5.2 g/dL    Total Bilirubin 0.3 0.0 - 1.2 mg/dL    Alkaline Phosphatase 48 40 - 129 U/L    ALT 36 0 - 50 U/L    AST 45 0 - 50 U/L   CBC with Auto Differential   Result Value Ref Range    WBC 12.3 (H) 4.5 - 11.5 k/uL    RBC 4.33 3.80 - 5.80 m/uL    Hemoglobin 13.0 12.5 - 16.5 g/dL    Hematocrit 40.8 37.0 - 54.0 %    MCV 94.2 80.0 - 99.9 fL    MCH 30.0 26.0 - 35.0 pg    MCHC 31.9 (L) 32.0 - 34.5 g/dL    RDW 14.5 11.5 - 15.0 %    Platelets 290 130 - 450 k/uL    MPV 10.7 7.0 - 12.0 fL    Neutrophils % 70 43.0 - 80.0 %    Lymphocytes % 22 20.0 - 42.0 %    Monocytes % 6 2.0 - 12.0 %    Eosinophils % 1 0 - 6 %    Basophils % 1 0.0 - 2.0 %    Immature Granulocytes % 0 0.0 - 5.0 %    Neutrophils Absolute 8.55 (H) 1.80 - 7.30 k/uL    Lymphocytes Absolute 2.73 1.50 - 4.00 k/uL    Monocytes Absolute 0.74 0.10 - 0.95 k/uL    Eosinophils Absolute 0.11 0.05 - 0.50 k/uL    Basophils Absolute 0.09 0.00 - 0.20 k/uL    Immature Granulocytes Absolute 0.04 0.00 - 0.58 k/uL   D-Dimer, Quantitative   Result Value Ref Range    D-Dimer, Quant <200 0 - 230 ng/mL DDU   Troponin   Result Value Ref Range    Troponin, High Sensitivity <6 0 - 22 ng/L       RADIOLOGY:  XR CHEST (2 VW)   Final Result   1. Questionable right perihilar airspace disease on the PA study is not   confirmed on the lateral chest x-ray.  If there is clinical concern for right   lung pneumonia, follow-up chest CT would be helpful.   2. Mild cardiomegaly.   3. Study was technically limited by body habitus.                 ------------------------- NURSING NOTES AND VITALS REVIEWED ---------------------------  Date / Time Roomed:  7/21/2025  7:46 PM  ED Bed Assignment:  Internal Waiting A/IntWa*    The nursing notes within the ED encounter and vital signs as below have been  pneumonia 6/14/2018    LVH (left ventricular hypertrophy)     Mesenteric ischemia     Murmur, cardiac 3/27/2012    GRICELDA (obstructive sleep apnea)     DOES NOT USE A MACHINE    Sinus problem     Syncope and collapse        Past Surgical History:   Procedure Laterality Date    APPENDECTOMY      BLOCK-NERVE Left 5/31/2016    Performed by [facility] at [facility] OR    CARDIAC CATHETERIZATION      COLONOSCOPY  2011    COLONOSCOPY N/A 5/3/2018    [facility] at [facility]    COLONOSCOPY N/A 9/10/2015    [facility] by [facility] at [facility]    CORONARY ARTERY BYPASS GRAFT  4/2007    x 1    CYSTOSCOPY N/A 8/30/2017    Per[facility] by [facility]ring [facility] at [facility] OR    CYSTOSCOPY N/A 11/10/2015    Per[facility] by [facility] at [facility] OR    ESOPHAGOGASTRODUODENOSCOPY (EGD) N/A 1/4/2016    [facility] ([facility]    ESOPHAGOGASTRODUODENOSCOPY (EGD) N/A 10/15/2014    Performed by [facility]i Harris [facility] at [facility] END[facility]    INJECTION-STEROID-EPIDURAL-TRANSFORAMINAL Left 2/25/2016    Performed by [facility] at [facility] OR    JOINT REPLACEMENT      left knee total replacement  X 3    mid leftt finger      from a cactuss    MOLE REMOVAL  2016    RADIOFREQUENCY THERMOCOAGULATION (RFTC)-NERVE-MEDIAN BRANCH-LUMBAR Left 4/11/2016    Performed by [facility] at [facility] OR    rotative cuff      no rotative cuffs on bilat shoulders has pins     SHOULDER SURGERY      shoulder surgery bilat  RIGHT X 4; LEFT X 3    TRANSRECTAL ULTRASOUND GUIDED PROSTATE BIOPSY Bilateral 11/10/2015    Performed by [facility] at [facility] OR    ULTRASOUND-ENDOSCOPIC-UPPER N/A 5/31/2017    [facility] ([facility])    ULTRASOUND-ENDOSCOPIC-UPPER N/A 1/4/2016    [facility] Tra [facility] (2ND FLR)    ULTRASOUND-ENDOSCOPIC-UPPER N/A 1/16/2015    Per[facility] by Jason Saleem [facility] at [facility] (2ND FLR)       Review of patient's  allergies indicates:   Allergen Reactions    Ace inhibitors Other (See Comments)     Cough    Arb-angiotensin receptor antagonist Itching    Eplerenone Other (See Comments)     Marked bradycardia, 40, tiredness and weakness      Sulfa (sulfonamide antibiotics) Itching     Patient says this was 10 years ago and doesn't remember what happened       Family History     Problem Relation (Age of Onset)    Cancer Father    Heart disease Mother, Sister    Hypertension Brother    Pneumonia Sister    Stroke Sister    Sudden death Father        Tobacco Use    Smoking status: Never Smoker    Smokeless tobacco: Never Used   Substance and Sexual Activity    Alcohol use: No    Drug use: No    Sexual activity: Not on file         Review of Systems   Unable to perform ROS: Acuity of condition     Objective:     Vital Signs (Most Recent):  Temp: 98.2 °F (36.8 °C) (03/25/19 0705)  Pulse: 71 (03/25/19 0705)  Resp: 15 (03/25/19 0705)  BP: (!) 152/70 (03/25/19 0705)  SpO2: 100 % (03/25/19 0705) Vital Signs (24h Range):  Temp:  [97.7 °F (36.5 °C)-99 °F (37.2 °C)] 98.2 °F (36.8 °C)  Pulse:  [] 71  Resp:  [13-41] 15  SpO2:  [87 %-100 %] 100 %  BP: (104-254)/() 152/70  Arterial Line BP: (136-218)/() 161/74     Weight: 106.6 kg (235 lb 0.2 oz)  Body mass index is 31.01 kg/m².      Intake/Output Summary (Last 24 hours) at 3/25/2019 0815  Last data filed at 3/25/2019 0600  Gross per 24 hour   Intake 593.33 ml   Output 1550 ml   Net -956.67 ml       Physical Exam   Constitutional: He is oriented to person, place, and time. He appears well-developed and well-nourished.   HENT:   Head: Normocephalic and atraumatic.   Cardiovascular: Normal rate and regular rhythm.   Pulmonary/Chest: Effort normal. No respiratory distress.   98% on room air  Minimal rales in bases   Abdominal: Soft. Bowel sounds are normal.   Musculoskeletal: He exhibits edema.   +Presacral edema   Neurological: He is alert and oriented to person, place,  and time.   Dysarthric   Skin: Skin is warm and dry.   Nursing note and vitals reviewed.      Vents:       Lines/Drains/Airways     Drain                 Hemodialysis AV Fistula Left upper arm -- days          Arterial Line                 Arterial Line Right Radial -- days          Peripheral Intravenous Line                 Peripheral IV - Single Lumen 03/24/19 1800 Right Antecubital less than 1 day         Peripheral IV - Single Lumen 03/25/19 0400 Right Forearm less than 1 day                Significant Labs:    CBC/Anemia Profile:  Recent Labs   Lab 03/24/19  0606 03/25/19  0212   WBC 7.66  7.66 7.96   HGB 10.7*  10.7* 10.6*   HCT 31.4*  31.4* 32.6*   PLT 92*  92* 97*   MCV 94  94 96   RDW 15.5*  15.5* 15.9*        Chemistries:  Recent Labs   Lab 03/23/19  1926 03/24/19  0606 03/25/19  0211   NA  --  134* 134*   K  --  4.8 4.8   CL  --  101 103   CO2  --  22* 16*   BUN  --  100* 115*   CREATININE  --  5.0* 5.4*   CALCIUM  --  6.8* 6.9*   ALBUMIN 2.9*  --  3.1*   PROT  --   --  5.3*   BILITOT  --   --  0.6   ALKPHOS  --   --  88   ALT  --   --  57*   AST  --   --  17   MG  --   --  2.4   PHOS 5.9*  --  7.5*       All pertinent labs within the past 24 hours have been reviewed.    Significant Imaging:   I have reviewed all pertinent imaging results/findings within the past 24 hours.    Assessment/Plan:     * Acute ischemic left PCA stroke  · Likely embolic   · Recommend heparin infusion and lower extremity ultrasound to evaluate as a possible source given his PFO. Biatrial enlargement a risk factor for clot, as well.  · Mgmt per primary.    CAP (community acquired pneumonia)  · On azithromycin for CAP.  · Doubt this diagnosis. Stop abx.    Patent foramen ovale  · Patent PFO noted on most recent TTE  · Recommend lower extremity ultrasound to evaluate for source of paradoxical embolus. Could also have an atrial thrombus, of course, given history of AF.     ESRD (end stage renal disease) on dialysis  · Fluid  overload is playing a role in his wheeze, but he's currently satting at 98% on RA.     Severe persistent asthma with exacerbation  · Highly doubt asthma as culprit for SOB  · Pt's BNP is >1100. Recommend diuresis  · Can continue duonebs, but only needs them PRN. He does not have a smoking history nor prior diagnosis of asthma.  · Recommend stopping steroids.          Thank you for your consult. I will sign off. Please contact us if you have any additional questions.     Tim Flowers MD  Pulmonology  [facility]

## (undated) DEVICE — SHEET DRAPE MEDIUM

## (undated) DEVICE — SET CYSTO IRRIGATION UNIV SPIK

## (undated) DEVICE — WATER STERILE INJ 500ML BAG

## (undated) DEVICE — SEE MEDLINE ITEM 152651

## (undated) DEVICE — SCRUB HIBICLENS 4% CHG 4OZ

## (undated) DEVICE — JELLY LUBRICANT STERILE 4 OZ